# Patient Record
Sex: FEMALE | Race: BLACK OR AFRICAN AMERICAN | NOT HISPANIC OR LATINO | URBAN - METROPOLITAN AREA
[De-identification: names, ages, dates, MRNs, and addresses within clinical notes are randomized per-mention and may not be internally consistent; named-entity substitution may affect disease eponyms.]

---

## 2017-03-20 ENCOUNTER — OUTPATIENT (OUTPATIENT)
Dept: OUTPATIENT SERVICES | Facility: HOSPITAL | Age: 67
LOS: 1 days | End: 2017-03-20
Payer: MEDICARE

## 2017-03-20 PROCEDURE — 77067 SCR MAMMO BI INCL CAD: CPT

## 2017-03-20 PROCEDURE — G0202: CPT | Mod: 26

## 2017-08-29 ENCOUNTER — OUTPATIENT (OUTPATIENT)
Dept: OUTPATIENT SERVICES | Facility: HOSPITAL | Age: 67
LOS: 1 days | End: 2017-08-29
Payer: MEDICARE

## 2017-08-29 PROCEDURE — 77080 DXA BONE DENSITY AXIAL: CPT | Mod: 26

## 2017-08-29 PROCEDURE — 77080 DXA BONE DENSITY AXIAL: CPT

## 2017-09-07 ENCOUNTER — APPOINTMENT (OUTPATIENT)
Dept: RHEUMATOLOGY | Facility: CLINIC | Age: 67
End: 2017-09-07
Payer: MEDICARE

## 2017-09-07 ENCOUNTER — LABORATORY RESULT (OUTPATIENT)
Age: 67
End: 2017-09-07

## 2017-09-07 VITALS — HEART RATE: 60 BPM | WEIGHT: 205 LBS | HEIGHT: 66 IN | OXYGEN SATURATION: 95 % | BODY MASS INDEX: 32.95 KG/M2

## 2017-09-07 DIAGNOSIS — I87.2 VENOUS INSUFFICIENCY (CHRONIC) (PERIPHERAL): ICD-10-CM

## 2017-09-07 DIAGNOSIS — Z82.49 FAMILY HISTORY OF ISCHEMIC HEART DISEASE AND OTHER DISEASES OF THE CIRCULATORY SYSTEM: ICD-10-CM

## 2017-09-07 DIAGNOSIS — Z80.2 FAMILY HISTORY OF MALIGNANT NEOPLASM OF OTHER RESPIRATORY AND INTRATHORACIC ORGANS: ICD-10-CM

## 2017-09-07 DIAGNOSIS — Z78.9 OTHER SPECIFIED HEALTH STATUS: ICD-10-CM

## 2017-09-07 DIAGNOSIS — Z87.891 PERSONAL HISTORY OF NICOTINE DEPENDENCE: ICD-10-CM

## 2017-09-07 PROCEDURE — 99203 OFFICE O/P NEW LOW 30 MIN: CPT

## 2017-09-08 LAB
25(OH)D3 SERPL-MCNC: 45.7 NG/ML
ALBUMIN SERPL ELPH-MCNC: 4.8 G/DL
ALP BLD-CCNC: 60 U/L
ALT SERPL-CCNC: 12 U/L
ANION GAP SERPL CALC-SCNC: 19 MMOL/L
AST SERPL-CCNC: 21 U/L
BASOPHILS # BLD AUTO: 0.04 K/UL
BASOPHILS NFR BLD AUTO: 0.4 %
BILIRUB SERPL-MCNC: 0.3 MG/DL
BUN SERPL-MCNC: 19 MG/DL
CALCIUM SERPL-MCNC: 10.3 MG/DL
CHLORIDE SERPL-SCNC: 99 MMOL/L
CO2 SERPL-SCNC: 24 MMOL/L
CREAT SERPL-MCNC: 0.98 MG/DL
CRP SERPL-MCNC: 0.7 MG/DL
EOSINOPHIL # BLD AUTO: 0.14 K/UL
EOSINOPHIL NFR BLD AUTO: 1.4 %
ERYTHROCYTE [SEDIMENTATION RATE] IN BLOOD BY WESTERGREN METHOD: 36 MM/HR
GLUCOSE SERPL-MCNC: 68 MG/DL
HCT VFR BLD CALC: 37.1 %
HGB BLD-MCNC: 11.3 G/DL
IMM GRANULOCYTES NFR BLD AUTO: 0.4 %
LYMPHOCYTES # BLD AUTO: 1.72 K/UL
LYMPHOCYTES NFR BLD AUTO: 16.7 %
MAN DIFF?: NORMAL
MCHC RBC-ENTMCNC: 25.9 PG
MCHC RBC-ENTMCNC: 30.5 GM/DL
MCV RBC AUTO: 84.9 FL
MONOCYTES # BLD AUTO: 0.52 K/UL
MONOCYTES NFR BLD AUTO: 5.1 %
NEUTROPHILS # BLD AUTO: 7.83 K/UL
NEUTROPHILS NFR BLD AUTO: 76 %
PLATELET # BLD AUTO: 273 K/UL
POTASSIUM SERPL-SCNC: 3.7 MMOL/L
PROT SERPL-MCNC: 8 G/DL
RBC # BLD: 4.37 M/UL
RBC # FLD: 17.9 %
SODIUM SERPL-SCNC: 142 MMOL/L
WBC # FLD AUTO: 10.29 K/UL

## 2017-09-18 LAB
ANA SER IF-ACNC: NEGATIVE
CCP AB SER IA-ACNC: 222 UNITS
RF+CCP IGG SER-IMP: ABNORMAL

## 2017-11-09 ENCOUNTER — APPOINTMENT (OUTPATIENT)
Dept: RHEUMATOLOGY | Facility: CLINIC | Age: 67
End: 2017-11-09
Payer: MEDICARE

## 2017-11-09 VITALS — OXYGEN SATURATION: 97 % | HEART RATE: 74 BPM | DIASTOLIC BLOOD PRESSURE: 82 MMHG | SYSTOLIC BLOOD PRESSURE: 140 MMHG

## 2017-11-09 PROCEDURE — 99214 OFFICE O/P EST MOD 30 MIN: CPT

## 2017-11-15 LAB
ALBUMIN SERPL ELPH-MCNC: 4.2 G/DL
ALP BLD-CCNC: 64 U/L
ALT SERPL-CCNC: 18 U/L
ANION GAP SERPL CALC-SCNC: 20 MMOL/L
AST SERPL-CCNC: 21 U/L
BASOPHILS # BLD AUTO: 0.03 K/UL
BASOPHILS NFR BLD AUTO: 0.2 %
BILIRUB SERPL-MCNC: 0.3 MG/DL
BUN SERPL-MCNC: 14 MG/DL
CALCIUM SERPL-MCNC: 9.9 MG/DL
CHLORIDE SERPL-SCNC: 104 MMOL/L
CO2 SERPL-SCNC: 24 MMOL/L
CREAT SERPL-MCNC: 0.91 MG/DL
CRP SERPL-MCNC: 0.5 MG/DL
EOSINOPHIL # BLD AUTO: 0.23 K/UL
EOSINOPHIL NFR BLD AUTO: 1.9 %
ERYTHROCYTE [SEDIMENTATION RATE] IN BLOOD BY WESTERGREN METHOD: 27 MM/HR
GLUCOSE SERPL-MCNC: 115 MG/DL
HCT VFR BLD CALC: 39.2 %
HGB BLD-MCNC: 11.7 G/DL
IMM GRANULOCYTES NFR BLD AUTO: 0.4 %
LYMPHOCYTES # BLD AUTO: 1.36 K/UL
LYMPHOCYTES NFR BLD AUTO: 11.2 %
MAN DIFF?: NORMAL
MCHC RBC-ENTMCNC: 25.8 PG
MCHC RBC-ENTMCNC: 29.8 GM/DL
MCV RBC AUTO: 86.3 FL
MONOCYTES # BLD AUTO: 0.63 K/UL
MONOCYTES NFR BLD AUTO: 5.2 %
NEUTROPHILS # BLD AUTO: 9.86 K/UL
NEUTROPHILS NFR BLD AUTO: 81.1 %
PLATELET # BLD AUTO: 221 K/UL
POTASSIUM SERPL-SCNC: 3.7 MMOL/L
PROT SERPL-MCNC: 7.3 G/DL
RBC # BLD: 4.54 M/UL
RBC # FLD: 17.9 %
SODIUM SERPL-SCNC: 148 MMOL/L
WBC # FLD AUTO: 12.16 K/UL

## 2018-02-08 ENCOUNTER — APPOINTMENT (OUTPATIENT)
Dept: RHEUMATOLOGY | Facility: CLINIC | Age: 68
End: 2018-02-08
Payer: MEDICARE

## 2018-02-08 VITALS
WEIGHT: 201 LBS | DIASTOLIC BLOOD PRESSURE: 78 MMHG | OXYGEN SATURATION: 95 % | BODY MASS INDEX: 32.3 KG/M2 | HEART RATE: 78 BPM | SYSTOLIC BLOOD PRESSURE: 138 MMHG | HEIGHT: 66 IN

## 2018-02-08 PROCEDURE — 36415 COLL VENOUS BLD VENIPUNCTURE: CPT

## 2018-02-08 PROCEDURE — 99214 OFFICE O/P EST MOD 30 MIN: CPT | Mod: 25

## 2018-02-14 LAB
ALBUMIN SERPL ELPH-MCNC: 4.2 G/DL
ALP BLD-CCNC: 60 U/L
ALT SERPL-CCNC: 12 U/L
ANION GAP SERPL CALC-SCNC: 21 MMOL/L
AST SERPL-CCNC: 21 U/L
BASOPHILS # BLD AUTO: 0.02 K/UL
BASOPHILS NFR BLD AUTO: 0.2 %
BILIRUB SERPL-MCNC: 0.3 MG/DL
BUN SERPL-MCNC: 20 MG/DL
CALCIUM SERPL-MCNC: 9.2 MG/DL
CHLORIDE SERPL-SCNC: 104 MMOL/L
CO2 SERPL-SCNC: 23 MMOL/L
CREAT SERPL-MCNC: 0.91 MG/DL
CRP SERPL-MCNC: 1.4 MG/DL
EOSINOPHIL # BLD AUTO: 0.13 K/UL
EOSINOPHIL NFR BLD AUTO: 1.2 %
ERYTHROCYTE [SEDIMENTATION RATE] IN BLOOD BY WESTERGREN METHOD: 42 MM/HR
GLUCOSE SERPL-MCNC: 123 MG/DL
HCT VFR BLD CALC: 37.8 %
HGB BLD-MCNC: 11.1 G/DL
IMM GRANULOCYTES NFR BLD AUTO: 0.2 %
LYMPHOCYTES # BLD AUTO: 1.13 K/UL
LYMPHOCYTES NFR BLD AUTO: 10.6 %
MAN DIFF?: NORMAL
MCHC RBC-ENTMCNC: 26 PG
MCHC RBC-ENTMCNC: 29.4 GM/DL
MCV RBC AUTO: 88.5 FL
MONOCYTES # BLD AUTO: 0.43 K/UL
MONOCYTES NFR BLD AUTO: 4 %
NEUTROPHILS # BLD AUTO: 8.98 K/UL
NEUTROPHILS NFR BLD AUTO: 83.8 %
PLATELET # BLD AUTO: 290 K/UL
POTASSIUM SERPL-SCNC: 4 MMOL/L
PROT SERPL-MCNC: 7.4 G/DL
RBC # BLD: 4.27 M/UL
RBC # FLD: 19.1 %
SODIUM SERPL-SCNC: 148 MMOL/L
WBC # FLD AUTO: 10.71 K/UL

## 2018-02-21 ENCOUNTER — TRANSCRIPTION ENCOUNTER (OUTPATIENT)
Age: 68
End: 2018-02-21

## 2018-02-21 ENCOUNTER — OUTPATIENT (OUTPATIENT)
Dept: OUTPATIENT SERVICES | Facility: HOSPITAL | Age: 68
LOS: 1 days | End: 2018-02-21
Payer: MEDICARE

## 2018-02-21 PROCEDURE — 72050 X-RAY EXAM NECK SPINE 4/5VWS: CPT

## 2018-02-21 PROCEDURE — 72050 X-RAY EXAM NECK SPINE 4/5VWS: CPT | Mod: 26

## 2018-02-23 ENCOUNTER — OUTPATIENT (OUTPATIENT)
Dept: OUTPATIENT SERVICES | Facility: HOSPITAL | Age: 68
LOS: 1 days | End: 2018-02-23
Payer: MEDICARE

## 2018-02-23 ENCOUNTER — APPOINTMENT (OUTPATIENT)
Dept: ULTRASOUND IMAGING | Facility: HOSPITAL | Age: 68
End: 2018-02-23

## 2018-02-23 PROCEDURE — 76882 US LMTD JT/FCL EVL NVASC XTR: CPT

## 2018-02-23 PROCEDURE — 76882 US LMTD JT/FCL EVL NVASC XTR: CPT | Mod: 26,LT

## 2018-02-26 ENCOUNTER — APPOINTMENT (OUTPATIENT)
Dept: RHEUMATOLOGY | Facility: CLINIC | Age: 68
End: 2018-02-26
Payer: MEDICARE

## 2018-02-26 VITALS — OXYGEN SATURATION: 98 % | SYSTOLIC BLOOD PRESSURE: 138 MMHG | DIASTOLIC BLOOD PRESSURE: 72 MMHG | HEART RATE: 79 BPM

## 2018-02-26 PROCEDURE — 99214 OFFICE O/P EST MOD 30 MIN: CPT

## 2018-03-15 ENCOUNTER — APPOINTMENT (OUTPATIENT)
Dept: RHEUMATOLOGY | Facility: CLINIC | Age: 68
End: 2018-03-15
Payer: MEDICARE

## 2018-03-15 PROCEDURE — 36415 COLL VENOUS BLD VENIPUNCTURE: CPT

## 2018-03-20 LAB
ALBUMIN SERPL ELPH-MCNC: 4.3 G/DL
ALP BLD-CCNC: 57 U/L
ALT SERPL-CCNC: 13 U/L
ANION GAP SERPL CALC-SCNC: 20 MMOL/L
AST SERPL-CCNC: 16 U/L
BASOPHILS # BLD AUTO: 0.03 K/UL
BASOPHILS NFR BLD AUTO: 0.3 %
BILIRUB SERPL-MCNC: 0.3 MG/DL
BUN SERPL-MCNC: 14 MG/DL
CALCIUM SERPL-MCNC: 9.4 MG/DL
CHLORIDE SERPL-SCNC: 96 MMOL/L
CO2 SERPL-SCNC: 26 MMOL/L
CREAT SERPL-MCNC: 1.01 MG/DL
CRP SERPL-MCNC: 0.6 MG/DL
EOSINOPHIL # BLD AUTO: 0.08 K/UL
EOSINOPHIL NFR BLD AUTO: 0.7 %
ERYTHROCYTE [SEDIMENTATION RATE] IN BLOOD BY WESTERGREN METHOD: 25 MM/HR
GLUCOSE SERPL-MCNC: 86 MG/DL
HCT VFR BLD CALC: 37.7 %
HGB BLD-MCNC: 11.4 G/DL
IMM GRANULOCYTES NFR BLD AUTO: 0.4 %
LYMPHOCYTES # BLD AUTO: 1.53 K/UL
LYMPHOCYTES NFR BLD AUTO: 13.4 %
MAN DIFF?: NORMAL
MCHC RBC-ENTMCNC: 26.8 PG
MCHC RBC-ENTMCNC: 30.2 GM/DL
MCV RBC AUTO: 88.7 FL
MONOCYTES # BLD AUTO: 0.51 K/UL
MONOCYTES NFR BLD AUTO: 4.5 %
NEUTROPHILS # BLD AUTO: 9.24 K/UL
NEUTROPHILS NFR BLD AUTO: 80.7 %
PLATELET # BLD AUTO: 227 K/UL
POTASSIUM SERPL-SCNC: 3.5 MMOL/L
PROT SERPL-MCNC: 7.1 G/DL
RBC # BLD: 4.25 M/UL
RBC # FLD: 18.3 %
SODIUM SERPL-SCNC: 142 MMOL/L
WBC # FLD AUTO: 11.44 K/UL

## 2018-04-17 ENCOUNTER — APPOINTMENT (OUTPATIENT)
Dept: RHEUMATOLOGY | Facility: CLINIC | Age: 68
End: 2018-04-17
Payer: MEDICARE

## 2018-04-17 VITALS — HEART RATE: 77 BPM | OXYGEN SATURATION: 97 % | DIASTOLIC BLOOD PRESSURE: 90 MMHG | SYSTOLIC BLOOD PRESSURE: 170 MMHG

## 2018-04-17 PROCEDURE — 36415 COLL VENOUS BLD VENIPUNCTURE: CPT

## 2018-04-17 PROCEDURE — 99213 OFFICE O/P EST LOW 20 MIN: CPT | Mod: 25

## 2018-04-18 LAB
ALBUMIN SERPL ELPH-MCNC: 4.1 G/DL
ALP BLD-CCNC: 52 U/L
ALT SERPL-CCNC: 15 U/L
ANION GAP SERPL CALC-SCNC: 17 MMOL/L
AST SERPL-CCNC: 16 U/L
BASOPHILS # BLD AUTO: 0.05 K/UL
BASOPHILS NFR BLD AUTO: 0.5 %
BILIRUB SERPL-MCNC: 0.2 MG/DL
BUN SERPL-MCNC: 15 MG/DL
CALCIUM SERPL-MCNC: 9.3 MG/DL
CHLORIDE SERPL-SCNC: 101 MMOL/L
CO2 SERPL-SCNC: 26 MMOL/L
CREAT SERPL-MCNC: 1.2 MG/DL
CRP SERPL-MCNC: 0.3 MG/DL
EOSINOPHIL # BLD AUTO: 0.19 K/UL
EOSINOPHIL NFR BLD AUTO: 1.8 %
ERYTHROCYTE [SEDIMENTATION RATE] IN BLOOD BY WESTERGREN METHOD: 33 MM/HR
GLUCOSE SERPL-MCNC: 84 MG/DL
HCT VFR BLD CALC: 36.9 %
HGB BLD-MCNC: 11.4 G/DL
IMM GRANULOCYTES NFR BLD AUTO: 0.7 %
LYMPHOCYTES # BLD AUTO: 2.01 K/UL
LYMPHOCYTES NFR BLD AUTO: 19.2 %
MAN DIFF?: NORMAL
MCHC RBC-ENTMCNC: 27.1 PG
MCHC RBC-ENTMCNC: 30.9 GM/DL
MCV RBC AUTO: 87.9 FL
MONOCYTES # BLD AUTO: 0.62 K/UL
MONOCYTES NFR BLD AUTO: 5.9 %
NEUTROPHILS # BLD AUTO: 7.53 K/UL
NEUTROPHILS NFR BLD AUTO: 71.9 %
PLATELET # BLD AUTO: 308 K/UL
POTASSIUM SERPL-SCNC: 4.3 MMOL/L
PROT SERPL-MCNC: 7.2 G/DL
RBC # BLD: 4.2 M/UL
RBC # FLD: 17.9 %
SODIUM SERPL-SCNC: 144 MMOL/L
WBC # FLD AUTO: 10.47 K/UL

## 2018-05-10 ENCOUNTER — APPOINTMENT (OUTPATIENT)
Dept: RHEUMATOLOGY | Facility: CLINIC | Age: 68
End: 2018-05-10

## 2018-05-30 ENCOUNTER — APPOINTMENT (OUTPATIENT)
Dept: MAMMOGRAPHY | Facility: HOSPITAL | Age: 68
End: 2018-05-30

## 2018-05-30 ENCOUNTER — OUTPATIENT (OUTPATIENT)
Dept: OUTPATIENT SERVICES | Facility: HOSPITAL | Age: 68
LOS: 1 days | End: 2018-05-30
Payer: MEDICARE

## 2018-05-30 PROCEDURE — 77066 DX MAMMO INCL CAD BI: CPT | Mod: 26,GG

## 2018-05-30 PROCEDURE — 77067 SCR MAMMO BI INCL CAD: CPT

## 2018-05-30 PROCEDURE — 77063 BREAST TOMOSYNTHESIS BI: CPT

## 2018-05-30 PROCEDURE — 77063 BREAST TOMOSYNTHESIS BI: CPT | Mod: 26,59

## 2018-05-30 PROCEDURE — 77066 DX MAMMO INCL CAD BI: CPT

## 2018-05-30 PROCEDURE — 77067 SCR MAMMO BI INCL CAD: CPT | Mod: 26,59

## 2018-06-20 ENCOUNTER — RESULT REVIEW (OUTPATIENT)
Age: 68
End: 2018-06-20

## 2018-06-20 ENCOUNTER — APPOINTMENT (OUTPATIENT)
Dept: MAMMOGRAPHY | Facility: HOSPITAL | Age: 68
End: 2018-06-20
Payer: MEDICARE

## 2018-06-20 ENCOUNTER — OUTPATIENT (OUTPATIENT)
Dept: OUTPATIENT SERVICES | Facility: HOSPITAL | Age: 68
LOS: 1 days | End: 2018-06-20
Payer: MEDICARE

## 2018-06-20 ENCOUNTER — APPOINTMENT (OUTPATIENT)
Dept: ULTRASOUND IMAGING | Facility: HOSPITAL | Age: 68
End: 2018-06-20
Payer: MEDICARE

## 2018-06-20 PROCEDURE — 76641 ULTRASOUND BREAST COMPLETE: CPT

## 2018-06-20 PROCEDURE — 76641 ULTRASOUND BREAST COMPLETE: CPT | Mod: 26,50

## 2018-06-20 PROCEDURE — 19081 BX BREAST 1ST LESION STRTCTC: CPT | Mod: RT

## 2018-06-20 PROCEDURE — A4648: CPT

## 2018-06-20 PROCEDURE — 19081 BX BREAST 1ST LESION STRTCTC: CPT

## 2018-06-20 PROCEDURE — 88305 TISSUE EXAM BY PATHOLOGIST: CPT

## 2018-06-21 LAB — SURGICAL PATHOLOGY STUDY: SIGNIFICANT CHANGE UP

## 2018-06-26 ENCOUNTER — RESULT REVIEW (OUTPATIENT)
Age: 68
End: 2018-06-26

## 2018-06-27 ENCOUNTER — OUTPATIENT (OUTPATIENT)
Dept: OUTPATIENT SERVICES | Facility: HOSPITAL | Age: 68
LOS: 1 days | End: 2018-06-27
Payer: MEDICARE

## 2018-06-27 ENCOUNTER — APPOINTMENT (OUTPATIENT)
Dept: MAMMOGRAPHY | Facility: HOSPITAL | Age: 68
End: 2018-06-27
Payer: MEDICARE

## 2018-06-27 PROCEDURE — A4648: CPT

## 2018-06-27 PROCEDURE — 19081 BX BREAST 1ST LESION STRTCTC: CPT

## 2018-06-27 PROCEDURE — 19081 BX BREAST 1ST LESION STRTCTC: CPT | Mod: LT

## 2018-06-27 PROCEDURE — 88305 TISSUE EXAM BY PATHOLOGIST: CPT

## 2018-06-28 LAB — SURGICAL PATHOLOGY STUDY: SIGNIFICANT CHANGE UP

## 2018-07-19 ENCOUNTER — APPOINTMENT (OUTPATIENT)
Dept: RHEUMATOLOGY | Facility: CLINIC | Age: 68
End: 2018-07-19
Payer: MEDICARE

## 2018-07-19 VITALS
OXYGEN SATURATION: 99 % | BODY MASS INDEX: 31.66 KG/M2 | TEMPERATURE: 98 F | HEART RATE: 64 BPM | WEIGHT: 197 LBS | DIASTOLIC BLOOD PRESSURE: 83 MMHG | HEIGHT: 66 IN | SYSTOLIC BLOOD PRESSURE: 139 MMHG

## 2018-07-19 PROCEDURE — 36415 COLL VENOUS BLD VENIPUNCTURE: CPT

## 2018-07-19 PROCEDURE — 99214 OFFICE O/P EST MOD 30 MIN: CPT | Mod: 25

## 2018-07-25 LAB
ALBUMIN SERPL ELPH-MCNC: 4.9 G/DL
ALP BLD-CCNC: 62 U/L
ALT SERPL-CCNC: 14 U/L
ANION GAP SERPL CALC-SCNC: 20 MMOL/L
AST SERPL-CCNC: 19 U/L
BASOPHILS # BLD AUTO: 0.03 K/UL
BASOPHILS NFR BLD AUTO: 0.4 %
BILIRUB SERPL-MCNC: 0.2 MG/DL
BUN SERPL-MCNC: 14 MG/DL
CALCIUM SERPL-MCNC: 10.3 MG/DL
CHLORIDE SERPL-SCNC: 103 MMOL/L
CO2 SERPL-SCNC: 27 MMOL/L
CREAT SERPL-MCNC: 0.8 MG/DL
CRP SERPL-MCNC: 0.15 MG/DL
EOSINOPHIL # BLD AUTO: 0.13 K/UL
EOSINOPHIL NFR BLD AUTO: 1.7 %
ERYTHROCYTE [SEDIMENTATION RATE] IN BLOOD BY WESTERGREN METHOD: 17 MM/HR
GLUCOSE SERPL-MCNC: 97 MG/DL
HCT VFR BLD CALC: 36.8 %
HGB BLD-MCNC: 10.5 G/DL
IMM GRANULOCYTES NFR BLD AUTO: 0.4 %
LYMPHOCYTES # BLD AUTO: 1.63 K/UL
LYMPHOCYTES NFR BLD AUTO: 21.1 %
MAN DIFF?: NORMAL
MCHC RBC-ENTMCNC: 26.5 PG
MCHC RBC-ENTMCNC: 28.5 GM/DL
MCV RBC AUTO: 92.9 FL
MONOCYTES # BLD AUTO: 0.42 K/UL
MONOCYTES NFR BLD AUTO: 5.4 %
NEUTROPHILS # BLD AUTO: 5.47 K/UL
NEUTROPHILS NFR BLD AUTO: 71 %
PLATELET # BLD AUTO: 273 K/UL
POTASSIUM SERPL-SCNC: 4.2 MMOL/L
PROT SERPL-MCNC: 7.4 G/DL
RBC # BLD: 3.96 M/UL
RBC # FLD: 17 %
SODIUM SERPL-SCNC: 150 MMOL/L
WBC # FLD AUTO: 7.71 K/UL

## 2018-07-28 PROBLEM — I87.2 VENOUS INSUFFICIENCY: Status: ACTIVE | Noted: 2017-09-07

## 2018-10-18 ENCOUNTER — OUTPATIENT (OUTPATIENT)
Dept: OUTPATIENT SERVICES | Facility: HOSPITAL | Age: 68
LOS: 1 days | End: 2018-10-18
Payer: MEDICARE

## 2018-10-18 ENCOUNTER — APPOINTMENT (OUTPATIENT)
Dept: RHEUMATOLOGY | Facility: CLINIC | Age: 68
End: 2018-10-18
Payer: MEDICARE

## 2018-10-18 VITALS
TEMPERATURE: 98.1 F | DIASTOLIC BLOOD PRESSURE: 81 MMHG | HEART RATE: 84 BPM | SYSTOLIC BLOOD PRESSURE: 165 MMHG | OXYGEN SATURATION: 97 % | HEIGHT: 66 IN | BODY MASS INDEX: 31.5 KG/M2 | WEIGHT: 196 LBS

## 2018-10-18 PROCEDURE — 99214 OFFICE O/P EST MOD 30 MIN: CPT | Mod: 25

## 2018-10-18 PROCEDURE — 36415 COLL VENOUS BLD VENIPUNCTURE: CPT

## 2018-10-18 PROCEDURE — 73130 X-RAY EXAM OF HAND: CPT | Mod: 26,LT,RT

## 2018-10-18 PROCEDURE — 73130 X-RAY EXAM OF HAND: CPT

## 2018-10-24 LAB
ALBUMIN SERPL ELPH-MCNC: 4.8 G/DL
ALP BLD-CCNC: 62 U/L
ALT SERPL-CCNC: 11 U/L
ANION GAP SERPL CALC-SCNC: 16 MMOL/L
AST SERPL-CCNC: 16 U/L
BASOPHILS # BLD AUTO: 0.05 K/UL
BASOPHILS NFR BLD AUTO: 0.4 %
BILIRUB SERPL-MCNC: 0.2 MG/DL
BUN SERPL-MCNC: 17 MG/DL
CALCIUM SERPL-MCNC: 10.7 MG/DL
CHLORIDE SERPL-SCNC: 100 MMOL/L
CO2 SERPL-SCNC: 28 MMOL/L
CREAT SERPL-MCNC: 0.93 MG/DL
CRP SERPL-MCNC: <0.1 MG/DL
EOSINOPHIL # BLD AUTO: 0.06 K/UL
EOSINOPHIL NFR BLD AUTO: 0.5 %
ERYTHROCYTE [SEDIMENTATION RATE] IN BLOOD BY WESTERGREN METHOD: 26 MM/HR
GLUCOSE SERPL-MCNC: 192 MG/DL
HCT VFR BLD CALC: 37.5 %
HGB BLD-MCNC: 11.2 G/DL
IMM GRANULOCYTES NFR BLD AUTO: 0.3 %
LYMPHOCYTES # BLD AUTO: 1.93 K/UL
LYMPHOCYTES NFR BLD AUTO: 16.2 %
MAN DIFF?: NORMAL
MCHC RBC-ENTMCNC: 26.9 PG
MCHC RBC-ENTMCNC: 29.9 GM/DL
MCV RBC AUTO: 90.1 FL
MONOCYTES # BLD AUTO: 0.67 K/UL
MONOCYTES NFR BLD AUTO: 5.6 %
NEUTROPHILS # BLD AUTO: 9.13 K/UL
NEUTROPHILS NFR BLD AUTO: 77 %
PLATELET # BLD AUTO: 293 K/UL
POTASSIUM SERPL-SCNC: 4.1 MMOL/L
PROT SERPL-MCNC: 7.7 G/DL
RBC # BLD: 4.16 M/UL
RBC # FLD: 16.9 %
SODIUM SERPL-SCNC: 144 MMOL/L
WBC # FLD AUTO: 11.88 K/UL

## 2018-11-19 ENCOUNTER — RX RENEWAL (OUTPATIENT)
Age: 68
End: 2018-11-19

## 2018-11-29 ENCOUNTER — APPOINTMENT (OUTPATIENT)
Dept: HEART AND VASCULAR | Facility: CLINIC | Age: 68
End: 2018-11-29
Payer: MEDICARE

## 2018-11-29 VITALS
TEMPERATURE: 98.6 F | SYSTOLIC BLOOD PRESSURE: 140 MMHG | DIASTOLIC BLOOD PRESSURE: 80 MMHG | WEIGHT: 193.98 LBS | BODY MASS INDEX: 34.37 KG/M2 | HEART RATE: 61 BPM | HEIGHT: 62.8 IN | OXYGEN SATURATION: 97 %

## 2018-11-29 PROCEDURE — 93000 ELECTROCARDIOGRAM COMPLETE: CPT

## 2018-11-29 PROCEDURE — 99204 OFFICE O/P NEW MOD 45 MIN: CPT

## 2018-11-29 RX ORDER — CHROMIUM 200 MCG
TABLET ORAL
Refills: 0 | Status: DISCONTINUED | COMMUNITY
End: 2018-11-29

## 2018-11-29 RX ORDER — FOLIC ACID 1 MG/1
1 TABLET ORAL
Qty: 60 | Refills: 5 | Status: DISCONTINUED | COMMUNITY
Start: 2018-02-08 | End: 2018-11-29

## 2018-11-29 RX ORDER — HYDROXYCHLOROQUINE SULFATE 200 MG/1
200 TABLET, FILM COATED ORAL
Qty: 60 | Refills: 0 | Status: DISCONTINUED | COMMUNITY
Start: 2017-06-02 | End: 2018-11-29

## 2018-11-29 RX ORDER — ASCORBIC ACID 500 MG
500 TABLET ORAL DAILY
Refills: 0 | Status: ACTIVE | COMMUNITY

## 2018-11-29 RX ORDER — HYDROCHLOROTHIAZIDE 12.5 MG/1
TABLET ORAL
Refills: 0 | Status: DISCONTINUED | COMMUNITY
End: 2018-11-29

## 2018-11-29 RX ORDER — HYDROXYCHLOROQUINE SULFATE 200 MG/1
200 TABLET, FILM COATED ORAL TWICE DAILY
Qty: 60 | Refills: 3 | Status: DISCONTINUED | COMMUNITY
Start: 2017-12-27 | End: 2018-11-29

## 2018-11-29 NOTE — REASON FOR VISIT
[FreeTextEntry1] : Patient seen for evaluation and management of chronic hypertension.\par \par She first developed HTN in the late 1990s, when her weight increased from a baseline of 175 lbs to >210. She managed BP with weight loss, and did not start meds for BP until about 2006, when her weight again increased, this time to a peak of about 230 lbs. She was found to have DM at the same time and initiated medical therapy for both. She attributes her weight gain to stress-related eating. She has since lost weight again, down to about 195, and continues to lose weight slowly and deliberately to a goal of 175. \par \par She keeps excellent records of her BP at home (see attached), which remains suboptimally controlled despite multidrug regimen which patient states she is taking as directed. She has a strong family hx of HTN.\par \par In addition to HTN and DM, she is a former 25 pack-year smoker, but quit in 1991. No hx of CVD and denies CP, SOB, palpitations. Has had prior stress testing (no clear indication), last performed on 2/27/2017 and reported to be "good." Had an echocardiogram at same time said to show "enlarged heart."\par \par Activity limited by chronic back pain related to disk disease, but walks daily about 5-10 blocks. Has RA, but sx's limited mostly to shoulders. States her exercise capacity has increased since she has lost weight.\par \par She is a retired RN. Not . Has one adult daughter.

## 2018-11-29 NOTE — PHYSICAL EXAM
[General Appearance - In No Acute Distress] : no acute distress [Normal Conjunctiva] : the conjunctiva exhibited no abnormalities [Eyelids - No Xanthelasma] : the eyelids demonstrated no xanthelasmas [Heart Sounds] : normal S1 and S2 [FreeTextEntry1] : occ premature beats. 2/6 BRICE at base [Respiration, Rhythm And Depth] : normal respiratory rhythm and effort [Auscultation Breath Sounds / Voice Sounds] : lungs were clear to auscultation bilaterally [Bowel Sounds] : normal bowel sounds [Abdomen Soft] : soft [Abdomen Tenderness] : non-tender [] : no hepato-splenomegaly [Nail Clubbing] : no clubbing of the fingernails [Cyanosis, Localized] : no localized cyanosis [Skin Color & Pigmentation] : normal skin color and pigmentation [Skin Turgor] : normal skin turgor [Affect] : the affect was normal

## 2018-11-29 NOTE — DISCUSSION/SUMMARY
[FreeTextEntry1] : Patient is on a complex multi-drug regimen for HTN, in addition to extensive list of other meds for DM, RA and chronic pain. She is monitoring her BP closely at home and is successfully gradually losing weight.\par \par We discussed strategies for better BP control including adding an additional antihypertensive agent. I suggested, as an alternative, that she continue to lose weight and monitor BP, and reassess progress in a few months. If she has not made significant progress with weight, or if weight loss is unaccompanied by fall in BP, will add another agent.\par \par I also asked patient to provide record of prior echocardiogram that reportedly showed an enlarged heart.

## 2019-01-02 ENCOUNTER — APPOINTMENT (OUTPATIENT)
Dept: MAMMOGRAPHY | Facility: HOSPITAL | Age: 69
End: 2019-01-02

## 2019-01-02 ENCOUNTER — OUTPATIENT (OUTPATIENT)
Dept: OUTPATIENT SERVICES | Facility: HOSPITAL | Age: 69
LOS: 1 days | End: 2019-01-02
Payer: MEDICARE

## 2019-01-02 PROCEDURE — 77066 DX MAMMO INCL CAD BI: CPT | Mod: 26

## 2019-01-02 PROCEDURE — G0279: CPT

## 2019-01-02 PROCEDURE — G0279: CPT | Mod: 26

## 2019-01-02 PROCEDURE — 77066 DX MAMMO INCL CAD BI: CPT

## 2019-01-10 ENCOUNTER — APPOINTMENT (OUTPATIENT)
Dept: ULTRASOUND IMAGING | Facility: HOSPITAL | Age: 69
End: 2019-01-10
Payer: MEDICARE

## 2019-01-10 ENCOUNTER — OUTPATIENT (OUTPATIENT)
Dept: OUTPATIENT SERVICES | Facility: HOSPITAL | Age: 69
LOS: 1 days | End: 2019-01-10
Payer: MEDICARE

## 2019-01-10 PROCEDURE — 76641 ULTRASOUND BREAST COMPLETE: CPT | Mod: 26,RT

## 2019-01-10 PROCEDURE — 76641 ULTRASOUND BREAST COMPLETE: CPT

## 2019-01-18 ENCOUNTER — APPOINTMENT (OUTPATIENT)
Dept: RHEUMATOLOGY | Facility: CLINIC | Age: 69
End: 2019-01-18
Payer: MEDICARE

## 2019-01-18 VITALS
SYSTOLIC BLOOD PRESSURE: 127 MMHG | TEMPERATURE: 98.8 F | BODY MASS INDEX: 34.6 KG/M2 | HEIGHT: 62 IN | DIASTOLIC BLOOD PRESSURE: 82 MMHG | HEART RATE: 68 BPM | OXYGEN SATURATION: 95 % | WEIGHT: 188 LBS

## 2019-01-18 PROCEDURE — 99214 OFFICE O/P EST MOD 30 MIN: CPT | Mod: 25

## 2019-01-18 PROCEDURE — 36415 COLL VENOUS BLD VENIPUNCTURE: CPT

## 2019-01-18 NOTE — ASSESSMENT
[FreeTextEntry1] : 68 year old female presents for evaluation of previously diagnosed seropositive nonerosive rheumatoid arthritis and lower back pain presents for follow up. \par Doing well at this time. \par No flares. \par Losing weight. Continue to encourage exercise. \par Continue current regimen of triple therapy. \par \par \par

## 2019-01-18 NOTE — HISTORY OF PRESENT ILLNESS
[___ Week(s) Ago] : [unfilled] week(s) ago [FreeTextEntry1] : Office Visit 10/181/8:\par Feels well overall \par No symptoms today - denies swelling/stiffness in joints\par Plan: Doing well. \par Continue triple therapy. \par Check labs. \par Continue vitamin d and dietary calcium. \par \par Office Visit 7/19/18:\par Notes pain in her R shoulder, rarely in L also. Points to subacromial bursae when asked for site of pain. Not worse with any movements. She takes Hydrocodone for back pain and states this relieves her shoulder pain. At night time she will take Tylonel usually. \par States she is active, walks about twenty blocks a day. \par Overall feeling better and without complaints. \par Feels at her normal baseline functional status at this time. \par Plan: Doing well \par Continue current regimen\par Check labs for monitoring toxicity on current regimen. \par \par Office Visit 4/17/18:\par Patient states she feels well and denies any joint complaints today \par States her prior shoulder pain feels much better\par Currently has a cold\par Mild leukocytosis and stable anemia on lab work\par Markers of disease activity trending down \par No swelling or morning stiffness in any joints\par Plan: Continue triple therapy for now given remarkable improvement both clinically and serologically. \par Check labs for monitoring of disease activity and toxicities from MTX and SSZ. \par \par Office Visit 2/26/18:\par Patient with elevated ESR/CRP on last set of labs, mild translation of C spine on XR and nodulosis. No active arthritis. Discussed that we should escalate therapy in this setting however she was resistant to doing so at this time. She refuses to take injectable therapies. We discussed the possibility of adding Sulfasalzine for triple therapy and she stated she would think about it. She presents today and agrees to try this. \par Denies any joint pains / swelling / stiffness today. \par No new nodules. \par Plan: Given suboptimally controlled disease with elevated acute phase reactants, C spine translation and nodulosis will add Sulfasalazine 500mg BID for now. Patient to return in two weeks to re check CBC. \par \par Office Visit 2/8/17:\par Patient states she feels well. \par Notes ongoing pain in her trapezius muscles but not interested in trying a C Spine pillow as I think this has to do with the way she sleeps. \par Not interested in PT\par SC nodule on her elbow is resolving. \par Denies swelling or morning stiffness in any joints. \par Plan: She is currently happy with her medication regimen. Her arthritis is not active and thus will continue her current medication regimen of MTX 20mg and folic acid 2mg daily along with Plaquenil 200mg daily. \par Check CBC, CMP, ESR and CRP \par She never went to have the US of her SC nodule on her arm but is not interested in doing so. \par Given her kyphosis and neck pain, I have encourage patient to try PT and a C spine pillow to correct the way she sleeps. She is not interested in trying the pillow but will consider PT pending what is available. \par Management of back pain per spine specialist. \par \par Office Visit 11/9/17:\par High titre RF on last set of labs.\par Denies swelling or morning stiffness in any joints today. \par Overall has felt great since her epidural injections (by pain specialist) \par Has a nodular bony lesion overlying L forearm which does not bother her currently. States she has had this for many years and it is more painful during disease flares. \par Plan: \par -Patient currently on MTX 20mg and folic acid 2mg daily along with Plaquenil 200mg daily and has done well on this regimen. She has no flares, and eventually I think we can consider tapering down her MTX. \par -She has ongoing back pain but states that her pain is under control with her Fentanyl patch and hydrocodone. \par -Optho screening UTD for plaquenil toxicity \par -US nodular lesion on forearm  \par Office visit 9/7/17:\par Patient diagnosed with rheumatoid arthritis (unclear serologies) twenty years ago though she only started therapy in 2009 and has been on a stable dose of MTX and Plaquenil since. She states that she initially had swelling and morning stiffness in her MCPs and wrists. Currently she denies any flares - no swelling or morning stiffness in any of her joints. She states she has essentially been well controlled since 2014, and this is consistent with her notes from Dr. Mueller. Though I have no recent lab results or serologies. \par Patient has a herniated disc in her back which causes pain, has difficulty standing for prolonged periods and walking more than 5 blocks. She sees a pain specialist and has a Fentanyl patch as well as Hydrocodone tablets, for which she takes up to 5 a day. \par Recent evaluation for lower extremity swelling, has a history of vein ablation and sclerotherapy in bilateral lower extremities. She had been started on Lasix which has partially resolved the swelling and she had a US last week which ruled out a DVT. \par Patient fully independent with ADLs and iADLS.

## 2019-01-18 NOTE — DATA REVIEWED
[FreeTextEntry1] : DEXA 8/29/17:\par LH TS -0.3 \par FN TS -2.4\par AP Spine TS 1.7 \par \par FRAX major fracture 8.4 hip fracture 1.9 \par \par XR C Spine 2/21/18:\par Degenerative changes of the cervical spine\par Translation of C3-4 and C4-5 anterolisthesis with extension and flexion. \par Translation of the C5 on C6 with flexion. \par \par US left wrist 2/23/18:\par Area of palpable abnormality corresponding to an osseous protuberance in the region of the ulna with differential considerations including osteophyte, exostosis and accessory ossicle and other etiologies. No ganglion cyst.

## 2019-01-18 NOTE — REVIEW OF SYSTEMS
[As Noted in HPI] : as noted in HPI [Fever] : no fever [Chills] : no chills [Eye Pain] : no eye pain [Red Eyes] : eyes not red [Dry Eyes] : no dryness of the eyes [Nasal Discharge] : no nasal discharge [Sore Throat] : no sore throat [Chest Pain] : no chest pain [Palpitations] : no palpitations [Leg Claudication] : no intermittent leg claudication [Lower Ext Edema] : no lower extremity edema [Shortness Of Breath] : no shortness of breath [Wheezing] : no wheezing [Cough] : no cough [SOB on Exertion] : no shortness of breath during exertion [Abdominal Pain] : no abdominal pain [Vomiting] : no vomiting [Constipation] : no constipation [Diarrhea] : no diarrhea [Dysuria] : no dysuria [Incontinence] : no incontinence [Skin Lesions] : no skin lesions [Dizziness] : no dizziness [Fainting] : no fainting [Anxiety] : no anxiety [Depression] : no depression

## 2019-01-18 NOTE — PHYSICAL EXAM
[General Appearance - Alert] : alert [General Appearance - In No Acute Distress] : in no acute distress [Sclera] : the sclera and conjunctiva were normal [Outer Ear] : the ears and nose were normal in appearance [Hearing Threshold Finger Rub Not [place]] : hearing was normal [Examination Of The Oral Cavity] : the lips and gums were normal [Neck Cervical Mass (___cm)] : no neck mass was observed [Respiration, Rhythm And Depth] : normal respiratory rhythm and effort [Auscultation Breath Sounds / Voice Sounds] : lungs were clear to auscultation bilaterally [Heart Rate And Rhythm] : heart rate was normal and rhythm regular [Heart Sounds] : normal S1 and S2 [Bowel Sounds] : normal bowel sounds [Abdomen Soft] : soft [Abdomen Tenderness] : non-tender [Cervical Lymph Nodes Enlarged Posterior Bilaterally] : posterior cervical [Cervical Lymph Nodes Enlarged Anterior Bilaterally] : anterior cervical [No Spinal Tenderness] : no spinal tenderness [Musculoskeletal - Swelling] : no joint swelling seen [Motor Tone] : muscle strength and tone were normal [] : no rash [Sensation] : the sensory exam was normal to light touch and pinprick [Motor Exam] : the motor exam was normal [Oriented To Time, Place, And Person] : oriented to person, place, and time [Affect] : the affect was normal [Mood] : the mood was normal [FreeTextEntry1] : Slow gait. No synovitis.

## 2019-01-23 LAB
ALBUMIN SERPL ELPH-MCNC: 4.9 G/DL
ALP BLD-CCNC: 51 U/L
ALT SERPL-CCNC: 13 U/L
ANION GAP SERPL CALC-SCNC: 16 MMOL/L
AST SERPL-CCNC: 20 U/L
BASOPHILS # BLD AUTO: 0.03 K/UL
BASOPHILS NFR BLD AUTO: 0.3 %
BILIRUB SERPL-MCNC: 0.2 MG/DL
BUN SERPL-MCNC: 21 MG/DL
CALCIUM SERPL-MCNC: 10.7 MG/DL
CHLORIDE SERPL-SCNC: 102 MMOL/L
CO2 SERPL-SCNC: 27 MMOL/L
CREAT SERPL-MCNC: 1.15 MG/DL
CRP SERPL-MCNC: 0.31 MG/DL
EOSINOPHIL # BLD AUTO: 0.11 K/UL
EOSINOPHIL NFR BLD AUTO: 1.2 %
ERYTHROCYTE [SEDIMENTATION RATE] IN BLOOD BY WESTERGREN METHOD: 32 MM/HR
GLUCOSE SERPL-MCNC: 75 MG/DL
HCT VFR BLD CALC: 39.6 %
HGB BLD-MCNC: 11.6 G/DL
IMM GRANULOCYTES NFR BLD AUTO: 0.4 %
LYMPHOCYTES # BLD AUTO: 1.76 K/UL
LYMPHOCYTES NFR BLD AUTO: 19.3 %
MAN DIFF?: NORMAL
MCHC RBC-ENTMCNC: 26.7 PG
MCHC RBC-ENTMCNC: 29.3 GM/DL
MCV RBC AUTO: 91 FL
MONOCYTES # BLD AUTO: 0.66 K/UL
MONOCYTES NFR BLD AUTO: 7.2 %
NEUTROPHILS # BLD AUTO: 6.54 K/UL
NEUTROPHILS NFR BLD AUTO: 71.6 %
PLATELET # BLD AUTO: 269 K/UL
POTASSIUM SERPL-SCNC: 3.9 MMOL/L
PROT SERPL-MCNC: 8.3 G/DL
RBC # BLD: 4.35 M/UL
RBC # FLD: 18.2 %
SODIUM SERPL-SCNC: 145 MMOL/L
WBC # FLD AUTO: 9.14 K/UL

## 2019-02-28 ENCOUNTER — OUTPATIENT (OUTPATIENT)
Dept: OUTPATIENT SERVICES | Facility: HOSPITAL | Age: 69
LOS: 1 days | End: 2019-02-28
Payer: MEDICARE

## 2019-02-28 ENCOUNTER — APPOINTMENT (OUTPATIENT)
Dept: HEART AND VASCULAR | Facility: CLINIC | Age: 69
End: 2019-02-28
Payer: MEDICARE

## 2019-02-28 ENCOUNTER — RESULT REVIEW (OUTPATIENT)
Age: 69
End: 2019-02-28

## 2019-02-28 ENCOUNTER — APPOINTMENT (OUTPATIENT)
Dept: MAMMOGRAPHY | Facility: HOSPITAL | Age: 69
End: 2019-02-28
Payer: MEDICARE

## 2019-02-28 VITALS
DIASTOLIC BLOOD PRESSURE: 80 MMHG | SYSTOLIC BLOOD PRESSURE: 130 MMHG | HEART RATE: 101 BPM | HEIGHT: 62 IN | BODY MASS INDEX: 34.23 KG/M2 | OXYGEN SATURATION: 97 % | TEMPERATURE: 99 F | WEIGHT: 186 LBS

## 2019-02-28 PROCEDURE — 19081 BX BREAST 1ST LESION STRTCTC: CPT | Mod: RT

## 2019-02-28 PROCEDURE — 19081 BX BREAST 1ST LESION STRTCTC: CPT

## 2019-02-28 PROCEDURE — 19082 BX BREAST ADD LESION STRTCTC: CPT

## 2019-02-28 PROCEDURE — 99213 OFFICE O/P EST LOW 20 MIN: CPT

## 2019-02-28 PROCEDURE — 93000 ELECTROCARDIOGRAM COMPLETE: CPT

## 2019-02-28 PROCEDURE — 88305 TISSUE EXAM BY PATHOLOGIST: CPT

## 2019-02-28 PROCEDURE — A4648: CPT

## 2019-02-28 PROCEDURE — 19082 BX BREAST ADD LESION STRTCTC: CPT | Mod: RT

## 2019-02-28 RX ORDER — METOPROLOL SUCCINATE 100 MG/1
TABLET, EXTENDED RELEASE ORAL
Refills: 0 | Status: DISCONTINUED | COMMUNITY
End: 2019-02-28

## 2019-02-28 NOTE — HISTORY OF PRESENT ILLNESS
[FreeTextEntry1] : Generally feeling well. Has made significant and sustained changes to how she eats, including avoiding food after 8:00 PM and adding fruits and vegetables to her diet, with about 4.5 kg weight loss since prior visit. She has documented improved control of her BP and blood sugar. Has started going to a gym. No symptoms.

## 2019-02-28 NOTE — PHYSICAL EXAM
[General Appearance - In No Acute Distress] : no acute distress [Auscultation Breath Sounds / Voice Sounds] : lungs were clear to auscultation bilaterally [Heart Sounds] : normal S1 and S2 [Edema] : no peripheral edema present [FreeTextEntry1] : occ premature beats Peripheral Venous

## 2019-03-01 LAB — SURGICAL PATHOLOGY STUDY: SIGNIFICANT CHANGE UP

## 2019-04-18 ENCOUNTER — APPOINTMENT (OUTPATIENT)
Dept: RHEUMATOLOGY | Facility: CLINIC | Age: 69
End: 2019-04-18
Payer: MEDICARE

## 2019-04-18 VITALS
WEIGHT: 185 LBS | SYSTOLIC BLOOD PRESSURE: 144 MMHG | DIASTOLIC BLOOD PRESSURE: 80 MMHG | OXYGEN SATURATION: 98 % | HEART RATE: 88 BPM | HEIGHT: 62 IN | TEMPERATURE: 98.8 F | BODY MASS INDEX: 34.04 KG/M2

## 2019-04-18 PROCEDURE — 36415 COLL VENOUS BLD VENIPUNCTURE: CPT

## 2019-04-18 PROCEDURE — 99214 OFFICE O/P EST MOD 30 MIN: CPT | Mod: 25

## 2019-04-18 NOTE — REVIEW OF SYSTEMS
[As Noted in HPI] : as noted in HPI [Fever] : no fever [Chills] : no chills [Red Eyes] : eyes not red [Eye Pain] : no eye pain [Dry Eyes] : no dryness of the eyes [Nasal Discharge] : no nasal discharge [Chest Pain] : no chest pain [Sore Throat] : no sore throat [Palpitations] : no palpitations [Leg Claudication] : no intermittent leg claudication [Shortness Of Breath] : no shortness of breath [Lower Ext Edema] : no lower extremity edema [Cough] : no cough [Wheezing] : no wheezing [SOB on Exertion] : no shortness of breath during exertion [Abdominal Pain] : no abdominal pain [Vomiting] : no vomiting [Constipation] : no constipation [Diarrhea] : no diarrhea [Dysuria] : no dysuria [Incontinence] : no incontinence [Skin Lesions] : no skin lesions [Dizziness] : no dizziness [Fainting] : no fainting [Anxiety] : no anxiety [Depression] : no depression

## 2019-04-18 NOTE — PHYSICAL EXAM
[General Appearance - Alert] : alert [General Appearance - In No Acute Distress] : in no acute distress [Sclera] : the sclera and conjunctiva were normal [Outer Ear] : the ears and nose were normal in appearance [Hearing Threshold Finger Rub Not [place]] : hearing was normal [Examination Of The Oral Cavity] : the lips and gums were normal [Neck Cervical Mass (___cm)] : no neck mass was observed [Respiration, Rhythm And Depth] : normal respiratory rhythm and effort [Auscultation Breath Sounds / Voice Sounds] : lungs were clear to auscultation bilaterally [Heart Rate And Rhythm] : heart rate was normal and rhythm regular [Heart Sounds] : normal S1 and S2 [Bowel Sounds] : normal bowel sounds [Abdomen Soft] : soft [Abdomen Tenderness] : non-tender [Cervical Lymph Nodes Enlarged Anterior Bilaterally] : anterior cervical [Cervical Lymph Nodes Enlarged Posterior Bilaterally] : posterior cervical [No Spinal Tenderness] : no spinal tenderness [Musculoskeletal - Swelling] : no joint swelling seen [Motor Tone] : muscle strength and tone were normal [] : no rash [Sensation] : the sensory exam was normal to light touch and pinprick [Motor Exam] : the motor exam was normal [Oriented To Time, Place, And Person] : oriented to person, place, and time [Affect] : the affect was normal [Mood] : the mood was normal [FreeTextEntry1] : Slow gait. No synovitis.

## 2019-04-18 NOTE — HISTORY OF PRESENT ILLNESS
[___ Week(s) Ago] : [unfilled] week(s) ago [FreeTextEntry1] : Office Visit 1/18/19:\par Doing well, no joint pains \par No joint pain / swelling / stiffness \par Plan: Doing well at this time. \par No flares. \par Losing weight. Continue to encourage exercise. \par Continue current regimen of triple therapy. \par \par Office Visit 10/181/8:\par Feels well overall \par No symptoms today - denies swelling/stiffness in joints\par Plan: Doing well. \par Continue triple therapy. \par Check labs. \par Continue vitamin d and dietary calcium. \par \par Office Visit 7/19/18:\par Notes pain in her R shoulder, rarely in L also. Points to subacromial bursae when asked for site of pain. Not worse with any movements. She takes Hydrocodone for back pain and states this relieves her shoulder pain. At night time she will take Tylonel usually. \par States she is active, walks about twenty blocks a day. \par Overall feeling better and without complaints. \par Feels at her normal baseline functional status at this time. \par Plan: Doing well \par Continue current regimen\par Check labs for monitoring toxicity on current regimen. \par \par Office Visit 4/17/18:\par Patient states she feels well and denies any joint complaints today \par States her prior shoulder pain feels much better\par Currently has a cold\par Mild leukocytosis and stable anemia on lab work\par Markers of disease activity trending down \par No swelling or morning stiffness in any joints\par Plan: Continue triple therapy for now given remarkable improvement both clinically and serologically. \par Check labs for monitoring of disease activity and toxicities from MTX and SSZ. \par \par Office Visit 2/26/18:\par Patient with elevated ESR/CRP on last set of labs, mild translation of C spine on XR and nodulosis. No active arthritis. Discussed that we should escalate therapy in this setting however she was resistant to doing so at this time. She refuses to take injectable therapies. We discussed the possibility of adding Sulfasalzine for triple therapy and she stated she would think about it. She presents today and agrees to try this. \par Denies any joint pains / swelling / stiffness today. \par No new nodules. \par Plan: Given suboptimally controlled disease with elevated acute phase reactants, C spine translation and nodulosis will add Sulfasalazine 500mg BID for now. Patient to return in two weeks to re check CBC. \par \par Office Visit 2/8/17:\par Patient states she feels well. \par Notes ongoing pain in her trapezius muscles but not interested in trying a C Spine pillow as I think this has to do with the way she sleeps. \par Not interested in PT\par SC nodule on her elbow is resolving. \par Denies swelling or morning stiffness in any joints. \par Plan: She is currently happy with her medication regimen. Her arthritis is not active and thus will continue her current medication regimen of MTX 20mg and folic acid 2mg daily along with Plaquenil 200mg daily. \par Check CBC, CMP, ESR and CRP \par She never went to have the US of her SC nodule on her arm but is not interested in doing so. \par Given her kyphosis and neck pain, I have encourage patient to try PT and a C spine pillow to correct the way she sleeps. She is not interested in trying the pillow but will consider PT pending what is available. \par Management of back pain per spine specialist. \par \par Office Visit 11/9/17:\par High titre RF on last set of labs.\par Denies swelling or morning stiffness in any joints today. \par Overall has felt great since her epidural injections (by pain specialist) \par Has a nodular bony lesion overlying L forearm which does not bother her currently. States she has had this for many years and it is more painful during disease flares. \par Plan: \par -Patient currently on MTX 20mg and folic acid 2mg daily along with Plaquenil 200mg daily and has done well on this regimen. She has no flares, and eventually I think we can consider tapering down her MTX. \par -She has ongoing back pain but states that her pain is under control with her Fentanyl patch and hydrocodone. \par -Optho screening UTD for plaquenil toxicity \par -US nodular lesion on forearm  \par Office visit 9/7/17:\par Patient diagnosed with rheumatoid arthritis (unclear serologies) twenty years ago though she only started therapy in 2009 and has been on a stable dose of MTX and Plaquenil since. She states that she initially had swelling and morning stiffness in her MCPs and wrists. Currently she denies any flares - no swelling or morning stiffness in any of her joints. She states she has essentially been well controlled since 2014, and this is consistent with her notes from Dr. Mueller. Though I have no recent lab results or serologies. \par Patient has a herniated disc in her back which causes pain, has difficulty standing for prolonged periods and walking more than 5 blocks. She sees a pain specialist and has a Fentanyl patch as well as Hydrocodone tablets, for which she takes up to 5 a day. \par Recent evaluation for lower extremity swelling, has a history of vein ablation and sclerotherapy in bilateral lower extremities. She had been started on Lasix which has partially resolved the swelling and she had a US last week which ruled out a DVT. \par Patient fully independent with ADLs and iADLS.

## 2019-04-18 NOTE — ASSESSMENT
[FreeTextEntry1] : 68 year old female presents for evaluation of previously diagnosed seropositive nonerosive rheumatoid arthritis and lower back pain presents for follow up. \par Doing well \par Continue triple therapy. \par Check labs. \par \par

## 2019-04-24 LAB
ALBUMIN SERPL ELPH-MCNC: 4.6 G/DL
ALP BLD-CCNC: 46 U/L
ALT SERPL-CCNC: 11 U/L
ANION GAP SERPL CALC-SCNC: 16 MMOL/L
AST SERPL-CCNC: 17 U/L
BASOPHILS # BLD AUTO: 0.06 K/UL
BASOPHILS NFR BLD AUTO: 0.6 %
BILIRUB SERPL-MCNC: 0.2 MG/DL
BUN SERPL-MCNC: 18 MG/DL
CALCIUM SERPL-MCNC: 9.6 MG/DL
CHLORIDE SERPL-SCNC: 104 MMOL/L
CO2 SERPL-SCNC: 26 MMOL/L
CREAT SERPL-MCNC: 1.86 MG/DL
CRP SERPL-MCNC: 0.25 MG/DL
EOSINOPHIL # BLD AUTO: 0.08 K/UL
EOSINOPHIL NFR BLD AUTO: 0.8 %
ERYTHROCYTE [SEDIMENTATION RATE] IN BLOOD BY WESTERGREN METHOD: 43 MM/HR
GLUCOSE SERPL-MCNC: 93 MG/DL
HCT VFR BLD CALC: 34.7 %
HGB BLD-MCNC: 10.2 G/DL
IMM GRANULOCYTES NFR BLD AUTO: 0.8 %
LYMPHOCYTES # BLD AUTO: 1.13 K/UL
LYMPHOCYTES NFR BLD AUTO: 11.1 %
MAN DIFF?: NORMAL
MCHC RBC-ENTMCNC: 27.6 PG
MCHC RBC-ENTMCNC: 29.4 GM/DL
MCV RBC AUTO: 94 FL
MONOCYTES # BLD AUTO: 0.73 K/UL
MONOCYTES NFR BLD AUTO: 7.2 %
NEUTROPHILS # BLD AUTO: 8.11 K/UL
NEUTROPHILS NFR BLD AUTO: 79.5 %
PLATELET # BLD AUTO: 193 K/UL
POTASSIUM SERPL-SCNC: 4.1 MMOL/L
PROT SERPL-MCNC: 6.9 G/DL
RBC # BLD: 3.69 M/UL
RBC # FLD: 17 %
SODIUM SERPL-SCNC: 145 MMOL/L
WBC # FLD AUTO: 10.19 K/UL

## 2019-05-07 ENCOUNTER — MOBILE ON CALL (OUTPATIENT)
Age: 69
End: 2019-05-07

## 2019-05-07 RX ORDER — OLMESARTAN MEDOXOMIL 40 MG/1
40 TABLET, FILM COATED ORAL DAILY
Refills: 0 | Status: DISCONTINUED | COMMUNITY
End: 2019-05-07

## 2019-06-27 ENCOUNTER — APPOINTMENT (OUTPATIENT)
Dept: HEART AND VASCULAR | Facility: CLINIC | Age: 69
End: 2019-06-27
Payer: MEDICARE

## 2019-06-27 VITALS
TEMPERATURE: 98.7 F | DIASTOLIC BLOOD PRESSURE: 68 MMHG | BODY MASS INDEX: 31.85 KG/M2 | HEART RATE: 81 BPM | OXYGEN SATURATION: 97 % | HEIGHT: 63.39 IN | SYSTOLIC BLOOD PRESSURE: 142 MMHG | WEIGHT: 181.99 LBS

## 2019-06-27 DIAGNOSIS — Z87.891 PERSONAL HISTORY OF NICOTINE DEPENDENCE: ICD-10-CM

## 2019-06-27 PROCEDURE — 93000 ELECTROCARDIOGRAM COMPLETE: CPT

## 2019-06-27 PROCEDURE — 99213 OFFICE O/P EST LOW 20 MIN: CPT

## 2019-06-27 NOTE — HISTORY OF PRESENT ILLNESS
[FreeTextEntry1] : Feeling generally well. Continuing to lose weight slowly. Has not been exercising regularly. BP at home typically as high as 150/90 mmHg in morning, and lower later in the day.\par

## 2019-06-27 NOTE — PHYSICAL EXAM
[Auscultation Breath Sounds / Voice Sounds] : lungs were clear to auscultation bilaterally [Heart Sounds] : normal S1 and S2 [Edema] : no peripheral edema present

## 2019-06-28 RX ORDER — FUROSEMIDE 40 MG/1
40 TABLET ORAL DAILY
Refills: 0 | Status: DISCONTINUED | COMMUNITY
End: 2019-06-28

## 2019-07-03 ENCOUNTER — RX RENEWAL (OUTPATIENT)
Age: 69
End: 2019-07-03

## 2019-07-09 ENCOUNTER — OUTPATIENT (OUTPATIENT)
Dept: OUTPATIENT SERVICES | Facility: HOSPITAL | Age: 69
LOS: 1 days | End: 2019-07-09
Payer: MEDICARE

## 2019-07-09 ENCOUNTER — APPOINTMENT (OUTPATIENT)
Dept: MAMMOGRAPHY | Facility: HOSPITAL | Age: 69
End: 2019-07-09
Payer: MEDICARE

## 2019-07-09 PROCEDURE — G0279: CPT

## 2019-07-09 PROCEDURE — 77065 DX MAMMO INCL CAD UNI: CPT | Mod: 26,RT

## 2019-07-09 PROCEDURE — G0279: CPT | Mod: 26

## 2019-07-09 PROCEDURE — 77065 DX MAMMO INCL CAD UNI: CPT

## 2019-07-15 ENCOUNTER — APPOINTMENT (OUTPATIENT)
Dept: RHEUMATOLOGY | Facility: CLINIC | Age: 69
End: 2019-07-15
Payer: MEDICARE

## 2019-07-15 ENCOUNTER — LABORATORY RESULT (OUTPATIENT)
Age: 69
End: 2019-07-15

## 2019-07-15 VITALS
TEMPERATURE: 98.8 F | DIASTOLIC BLOOD PRESSURE: 70 MMHG | HEIGHT: 63.39 IN | OXYGEN SATURATION: 100 % | BODY MASS INDEX: 31.32 KG/M2 | WEIGHT: 179 LBS | HEART RATE: 65 BPM | SYSTOLIC BLOOD PRESSURE: 138 MMHG

## 2019-07-15 PROCEDURE — 36415 COLL VENOUS BLD VENIPUNCTURE: CPT

## 2019-07-15 PROCEDURE — 99214 OFFICE O/P EST MOD 30 MIN: CPT | Mod: 25

## 2019-07-16 ENCOUNTER — CHART COPY (OUTPATIENT)
Age: 69
End: 2019-07-16

## 2019-07-16 NOTE — HISTORY OF PRESENT ILLNESS
[___ Week(s) Ago] : [unfilled] week(s) ago [FreeTextEntry1] : Office Visit 4/18/19:\par Joints feel good. Denies pain or swelling. \par Plan: Doing well \par Continue triple therapy. \par Check labs. \par \par Office Visit 1/18/19:\par Doing well, no joint pains \par No joint pain / swelling / stiffness \par Plan: Doing well at this time. \par No flares. \par Losing weight. Continue to encourage exercise. \par Continue current regimen of triple therapy. \par \par Office Visit 10/181/8:\par Feels well overall \par No symptoms today - denies swelling/stiffness in joints\par Plan: Doing well. \par Continue triple therapy. \par Check labs. \par Continue vitamin d and dietary calcium. \par \par Office Visit 7/19/18:\par Notes pain in her R shoulder, rarely in L also. Points to subacromial bursae when asked for site of pain. Not worse with any movements. She takes Hydrocodone for back pain and states this relieves her shoulder pain. At night time she will take Tylonel usually. \par States she is active, walks about twenty blocks a day. \par Overall feeling better and without complaints. \par Feels at her normal baseline functional status at this time. \par Plan: Doing well \par Continue current regimen\par Check labs for monitoring toxicity on current regimen. \par \par Office Visit 4/17/18:\par Patient states she feels well and denies any joint complaints today \par States her prior shoulder pain feels much better\par Currently has a cold\par Mild leukocytosis and stable anemia on lab work\par Markers of disease activity trending down \par No swelling or morning stiffness in any joints\par Plan: Continue triple therapy for now given remarkable improvement both clinically and serologically. \par Check labs for monitoring of disease activity and toxicities from MTX and SSZ. \par \par Office Visit 2/26/18:\par Patient with elevated ESR/CRP on last set of labs, mild translation of C spine on XR and nodulosis. No active arthritis. Discussed that we should escalate therapy in this setting however she was resistant to doing so at this time. She refuses to take injectable therapies. We discussed the possibility of adding Sulfasalzine for triple therapy and she stated she would think about it. She presents today and agrees to try this. \par Denies any joint pains / swelling / stiffness today. \par No new nodules. \par Plan: Given suboptimally controlled disease with elevated acute phase reactants, C spine translation and nodulosis will add Sulfasalazine 500mg BID for now. Patient to return in two weeks to re check CBC. \par \par Office Visit 2/8/17:\par Patient states she feels well. \par Notes ongoing pain in her trapezius muscles but not interested in trying a C Spine pillow as I think this has to do with the way she sleeps. \par Not interested in PT\par SC nodule on her elbow is resolving. \par Denies swelling or morning stiffness in any joints. \par Plan: She is currently happy with her medication regimen. Her arthritis is not active and thus will continue her current medication regimen of MTX 20mg and folic acid 2mg daily along with Plaquenil 200mg daily. \par Check CBC, CMP, ESR and CRP \par She never went to have the US of her SC nodule on her arm but is not interested in doing so. \par Given her kyphosis and neck pain, I have encourage patient to try PT and a C spine pillow to correct the way she sleeps. She is not interested in trying the pillow but will consider PT pending what is available. \par Management of back pain per spine specialist. \par \par Office Visit 11/9/17:\par High titre RF on last set of labs.\par Denies swelling or morning stiffness in any joints today. \par Overall has felt great since her epidural injections (by pain specialist) \par Has a nodular bony lesion overlying L forearm which does not bother her currently. States she has had this for many years and it is more painful during disease flares. \par Plan: \par -Patient currently on MTX 20mg and folic acid 2mg daily along with Plaquenil 200mg daily and has done well on this regimen. She has no flares, and eventually I think we can consider tapering down her MTX. \par -She has ongoing back pain but states that her pain is under control with her Fentanyl patch and hydrocodone. \par -Optho screening UTD for plaquenil toxicity \par -US nodular lesion on forearm  \par Office visit 9/7/17:\par Patient diagnosed with rheumatoid arthritis (unclear serologies) twenty years ago though she only started therapy in 2009 and has been on a stable dose of MTX and Plaquenil since. She states that she initially had swelling and morning stiffness in her MCPs and wrists. Currently she denies any flares - no swelling or morning stiffness in any of her joints. She states she has essentially been well controlled since 2014, and this is consistent with her notes from Dr. Mueller. Though I have no recent lab results or serologies. \par Patient has a herniated disc in her back which causes pain, has difficulty standing for prolonged periods and walking more than 5 blocks. She sees a pain specialist and has a Fentanyl patch as well as Hydrocodone tablets, for which she takes up to 5 a day. \par Recent evaluation for lower extremity swelling, has a history of vein ablation and sclerotherapy in bilateral lower extremities. She had been started on Lasix which has partially resolved the swelling and she had a US last week which ruled out a DVT. \par Patient fully independent with ADLs and iADLS.

## 2019-07-16 NOTE — ASSESSMENT
[FreeTextEntry1] : 69 year old female presents for evaluation of previously diagnosed seropositive nonerosive rheumatoid arthritis and lower back pain presents for follow up. \par Doing well. Continue triple therapy. \par Check labs\par PT referral\par \par

## 2019-07-16 NOTE — PHYSICAL EXAM
[General Appearance - Alert] : alert [General Appearance - In No Acute Distress] : in no acute distress [Sclera] : the sclera and conjunctiva were normal [Outer Ear] : the ears and nose were normal in appearance [Hearing Threshold Finger Rub Not [place]] : hearing was normal [Examination Of The Oral Cavity] : the lips and gums were normal [Neck Cervical Mass (___cm)] : no neck mass was observed [Respiration, Rhythm And Depth] : normal respiratory rhythm and effort [Auscultation Breath Sounds / Voice Sounds] : lungs were clear to auscultation bilaterally [Heart Rate And Rhythm] : heart rate was normal and rhythm regular [Heart Sounds] : normal S1 and S2 [Bowel Sounds] : normal bowel sounds [Abdomen Soft] : soft [Abdomen Tenderness] : non-tender [Cervical Lymph Nodes Enlarged Posterior Bilaterally] : posterior cervical [Cervical Lymph Nodes Enlarged Anterior Bilaterally] : anterior cervical [No Spinal Tenderness] : no spinal tenderness [Musculoskeletal - Swelling] : no joint swelling seen [Motor Tone] : muscle strength and tone were normal [] : no rash [Sensation] : the sensory exam was normal to light touch and pinprick [Motor Exam] : the motor exam was normal [Oriented To Time, Place, And Person] : oriented to person, place, and time [Affect] : the affect was normal [Mood] : the mood was normal [FreeTextEntry1] : Slow gait. No synovitis.

## 2019-07-17 LAB
ALBUMIN SERPL ELPH-MCNC: 4.2 G/DL
ALP BLD-CCNC: 56 U/L
ALT SERPL-CCNC: 9 U/L
ANION GAP SERPL CALC-SCNC: 14 MMOL/L
AST SERPL-CCNC: 17 U/L
BASOPHILS # BLD AUTO: 0.06 K/UL
BASOPHILS NFR BLD AUTO: 0.9 %
BILIRUB SERPL-MCNC: 0.3 MG/DL
BUN SERPL-MCNC: 22 MG/DL
CALCIUM SERPL-MCNC: 10 MG/DL
CHLORIDE SERPL-SCNC: 99 MMOL/L
CO2 SERPL-SCNC: 28 MMOL/L
CREAT SERPL-MCNC: 1.76 MG/DL
CRP SERPL-MCNC: 4.2 MG/DL
EOSINOPHIL # BLD AUTO: 0.11 K/UL
EOSINOPHIL NFR BLD AUTO: 1.6 %
ERYTHROCYTE [SEDIMENTATION RATE] IN BLOOD BY WESTERGREN METHOD: 80 MM/HR
GLUCOSE SERPL-MCNC: 151 MG/DL
HCT VFR BLD CALC: 29.8 %
HGB BLD-MCNC: 9.1 G/DL
IMM GRANULOCYTES NFR BLD AUTO: 0.3 %
LYMPHOCYTES # BLD AUTO: 1.66 K/UL
LYMPHOCYTES NFR BLD AUTO: 23.8 %
MAN DIFF?: NORMAL
MCHC RBC-ENTMCNC: 28.4 PG
MCHC RBC-ENTMCNC: 30.5 GM/DL
MCV RBC AUTO: 93.1 FL
MONOCYTES # BLD AUTO: 0.5 K/UL
MONOCYTES NFR BLD AUTO: 7.2 %
NEUTROPHILS # BLD AUTO: 4.63 K/UL
NEUTROPHILS NFR BLD AUTO: 66.2 %
PLATELET # BLD AUTO: 267 K/UL
POTASSIUM SERPL-SCNC: 3.3 MMOL/L
PROT SERPL-MCNC: 7.2 G/DL
RBC # BLD: 3.2 M/UL
RBC # FLD: 13.8 %
SODIUM SERPL-SCNC: 141 MMOL/L
WBC # FLD AUTO: 6.98 K/UL

## 2019-08-19 ENCOUNTER — APPOINTMENT (OUTPATIENT)
Dept: RHEUMATOLOGY | Facility: CLINIC | Age: 69
End: 2019-08-19
Payer: MEDICARE

## 2019-08-19 VITALS
TEMPERATURE: 98.9 F | BODY MASS INDEX: 32.6 KG/M2 | HEART RATE: 60 BPM | OXYGEN SATURATION: 98 % | WEIGHT: 184 LBS | HEIGHT: 63 IN | SYSTOLIC BLOOD PRESSURE: 145 MMHG | DIASTOLIC BLOOD PRESSURE: 74 MMHG

## 2019-08-19 PROCEDURE — 99214 OFFICE O/P EST MOD 30 MIN: CPT

## 2019-08-20 NOTE — REVIEW OF SYSTEMS
[As Noted in HPI] : as noted in HPI [Chills] : no chills [Fever] : no fever [Eye Pain] : no eye pain [Red Eyes] : eyes not red [Dry Eyes] : no dryness of the eyes [Nasal Discharge] : no nasal discharge [Sore Throat] : no sore throat [Chest Pain] : no chest pain [Leg Claudication] : no intermittent leg claudication [Palpitations] : no palpitations [Lower Ext Edema] : no lower extremity edema [Shortness Of Breath] : no shortness of breath [Wheezing] : no wheezing [SOB on Exertion] : no shortness of breath during exertion [Cough] : no cough [Abdominal Pain] : no abdominal pain [Vomiting] : no vomiting [Constipation] : no constipation [Diarrhea] : no diarrhea [Incontinence] : no incontinence [Dysuria] : no dysuria [Skin Lesions] : no skin lesions [Dizziness] : no dizziness [Fainting] : no fainting [Anxiety] : no anxiety [Depression] : no depression

## 2019-08-20 NOTE — ASSESSMENT
[FreeTextEntry1] : 69 year old female presents for evaluation of previously diagnosed seropositive nonerosive rheumatoid arthritis and lower back pain presents for follow up. \par Doing well on triple therapy. \par Continue current medication regimen. \par Continue PT

## 2019-08-20 NOTE — PHYSICAL EXAM
[General Appearance - Alert] : alert [General Appearance - In No Acute Distress] : in no acute distress [Sclera] : the sclera and conjunctiva were normal [Outer Ear] : the ears and nose were normal in appearance [Hearing Threshold Finger Rub Not [place]] : hearing was normal [Examination Of The Oral Cavity] : the lips and gums were normal [Neck Cervical Mass (___cm)] : no neck mass was observed [Respiration, Rhythm And Depth] : normal respiratory rhythm and effort [Auscultation Breath Sounds / Voice Sounds] : lungs were clear to auscultation bilaterally [Heart Rate And Rhythm] : heart rate was normal and rhythm regular [Heart Sounds] : normal S1 and S2 [Bowel Sounds] : normal bowel sounds [Abdomen Soft] : soft [Abdomen Tenderness] : non-tender [Cervical Lymph Nodes Enlarged Posterior Bilaterally] : posterior cervical [Cervical Lymph Nodes Enlarged Anterior Bilaterally] : anterior cervical [No Spinal Tenderness] : no spinal tenderness [Motor Tone] : muscle strength and tone were normal [Musculoskeletal - Swelling] : no joint swelling seen [] : no rash [Motor Exam] : the motor exam was normal [Sensation] : the sensory exam was normal to light touch and pinprick [Oriented To Time, Place, And Person] : oriented to person, place, and time [Affect] : the affect was normal [Mood] : the mood was normal [FreeTextEntry1] : Slow gait. No synovitis.

## 2019-08-20 NOTE — HISTORY OF PRESENT ILLNESS
[___ Week(s) Ago] : [unfilled] week(s) ago [FreeTextEntry1] : 69 year old female presents for evaluation of previously diagnosed rheumatoid arthritis and lower back pain. \par \par Current Medications:\par MTX 20mg weekly with 2mg folic acid daily \par Plaquenil 200mg twice daily (ophtho 3/19) \par Sulfasalazine 500mg BID\par Hydrocodone (per pain specialist) --> Morphine derivative\par Vit D, dietary ca \par \par Office Visit 8/19/19:\par Feeling well, shoulder improved\par Going to PT twice a week \par No joint pain

## 2019-09-06 ENCOUNTER — OUTPATIENT (OUTPATIENT)
Dept: OUTPATIENT SERVICES | Facility: HOSPITAL | Age: 69
LOS: 1 days | End: 2019-09-06
Payer: MEDICARE

## 2019-09-06 ENCOUNTER — APPOINTMENT (OUTPATIENT)
Dept: MRI IMAGING | Facility: HOSPITAL | Age: 69
End: 2019-09-06
Payer: MEDICARE

## 2019-09-06 PROCEDURE — A9585: CPT

## 2019-09-06 PROCEDURE — C8937: CPT

## 2019-09-06 PROCEDURE — 77049 MRI BREAST C-+ W/CAD BI: CPT | Mod: 26

## 2019-09-06 PROCEDURE — 77049 MRI BREAST C-+ W/CAD BI: CPT

## 2019-09-26 ENCOUNTER — APPOINTMENT (OUTPATIENT)
Dept: HEART AND VASCULAR | Facility: CLINIC | Age: 69
End: 2019-09-26
Payer: MEDICARE

## 2019-09-26 VITALS
HEART RATE: 74 BPM | OXYGEN SATURATION: 98 % | HEIGHT: 63 IN | BODY MASS INDEX: 32.78 KG/M2 | WEIGHT: 184.99 LBS | TEMPERATURE: 98.7 F | SYSTOLIC BLOOD PRESSURE: 146 MMHG | DIASTOLIC BLOOD PRESSURE: 78 MMHG

## 2019-09-26 PROCEDURE — 99213 OFFICE O/P EST LOW 20 MIN: CPT

## 2019-09-26 NOTE — HISTORY OF PRESENT ILLNESS
[FreeTextEntry1] : Feels generally well. Walks daily without symptoms. Doing some light exercises. Still working on losing weight. Denied any chest pain, dyspnea, palpitations, lightheadedness.\par Patient reports taking her listed medications as directed.\par

## 2019-09-26 NOTE — PHYSICAL EXAM
[Auscultation Breath Sounds / Voice Sounds] : lungs were clear to auscultation bilaterally [General Appearance - In No Acute Distress] : no acute distress [Heart Sounds] : normal S1 and S2 [Edema] : no peripheral edema present

## 2019-10-25 ENCOUNTER — OUTPATIENT (OUTPATIENT)
Dept: OUTPATIENT SERVICES | Facility: HOSPITAL | Age: 69
LOS: 1 days | End: 2019-10-25
Payer: MEDICARE

## 2019-10-25 PROCEDURE — 71046 X-RAY EXAM CHEST 2 VIEWS: CPT

## 2019-10-25 PROCEDURE — 71046 X-RAY EXAM CHEST 2 VIEWS: CPT | Mod: 26

## 2019-11-20 ENCOUNTER — OUTPATIENT (OUTPATIENT)
Dept: OUTPATIENT SERVICES | Facility: HOSPITAL | Age: 69
LOS: 1 days | End: 2019-11-20
Payer: MEDICARE

## 2019-11-20 ENCOUNTER — APPOINTMENT (OUTPATIENT)
Dept: RADIOLOGY | Facility: HOSPITAL | Age: 69
End: 2019-11-20
Payer: MEDICARE

## 2019-11-20 PROCEDURE — 77080 DXA BONE DENSITY AXIAL: CPT

## 2019-11-20 PROCEDURE — 77080 DXA BONE DENSITY AXIAL: CPT | Mod: 26

## 2019-12-03 ENCOUNTER — APPOINTMENT (OUTPATIENT)
Dept: RHEUMATOLOGY | Facility: CLINIC | Age: 69
End: 2019-12-03
Payer: MEDICARE

## 2019-12-03 ENCOUNTER — LABORATORY RESULT (OUTPATIENT)
Age: 69
End: 2019-12-03

## 2019-12-03 VITALS
TEMPERATURE: 98.5 F | HEART RATE: 72 BPM | BODY MASS INDEX: 32.78 KG/M2 | HEIGHT: 63 IN | OXYGEN SATURATION: 95 % | DIASTOLIC BLOOD PRESSURE: 80 MMHG | SYSTOLIC BLOOD PRESSURE: 133 MMHG | WEIGHT: 185 LBS

## 2019-12-03 PROCEDURE — 99214 OFFICE O/P EST MOD 30 MIN: CPT

## 2019-12-04 NOTE — HISTORY OF PRESENT ILLNESS
[___ Week(s) Ago] : [unfilled] week(s) ago [FreeTextEntry1] : Office Visit 8/19/19:\par Feeling well, shoulder improved\par Going to PT twice a week \par No joint pain \par Plan: Doing well on triple therapy. \par Continue current medication regimen. \par Continue PT \par \par Office Visit 7/15/19:\par No flares\par Doing well \par Plan: Doing well. Continue triple therapy. \par Check labs\par PT referral\par \par Office Visit 4/18/19:\par Joints feel good. Denies pain or swelling. \par Plan: Doing well \par Continue triple therapy. \par Check labs. \par \par Office Visit 1/18/19:\par Doing well, no joint pains \par No joint pain / swelling / stiffness \par Plan: Doing well at this time. \par No flares. \par Losing weight. Continue to encourage exercise. \par Continue current regimen of triple therapy. \par \par Office Visit 10/181/8:\par Feels well overall \par No symptoms today - denies swelling/stiffness in joints\par Plan: Doing well. \par Continue triple therapy. \par Check labs. \par Continue vitamin d and dietary calcium. \par \par Office Visit 7/19/18:\par Notes pain in her R shoulder, rarely in L also. Points to subacromial bursae when asked for site of pain. Not worse with any movements. She takes Hydrocodone for back pain and states this relieves her shoulder pain. At night time she will take Tylonel usually. \par States she is active, walks about twenty blocks a day. \par Overall feeling better and without complaints. \par Feels at her normal baseline functional status at this time. \par Plan: Doing well \par Continue current regimen\par Check labs for monitoring toxicity on current regimen. \par \par Office Visit 4/17/18:\par Patient states she feels well and denies any joint complaints today \par States her prior shoulder pain feels much better\par Currently has a cold\par Mild leukocytosis and stable anemia on lab work\par Markers of disease activity trending down \par No swelling or morning stiffness in any joints\par Plan: Continue triple therapy for now given remarkable improvement both clinically and serologically. \par Check labs for monitoring of disease activity and toxicities from MTX and SSZ. \par \par Office Visit 2/26/18:\par Patient with elevated ESR/CRP on last set of labs, mild translation of C spine on XR and nodulosis. No active arthritis. Discussed that we should escalate therapy in this setting however she was resistant to doing so at this time. She refuses to take injectable therapies. We discussed the possibility of adding Sulfasalzine for triple therapy and she stated she would think about it. She presents today and agrees to try this. \par Denies any joint pains / swelling / stiffness today. \par No new nodules. \par Plan: Given suboptimally controlled disease with elevated acute phase reactants, C spine translation and nodulosis will add Sulfasalazine 500mg BID for now. Patient to return in two weeks to re check CBC. \par \par Office Visit 2/8/17:\par Patient states she feels well. \par Notes ongoing pain in her trapezius muscles but not interested in trying a C Spine pillow as I think this has to do with the way she sleeps. \par Not interested in PT\par SC nodule on her elbow is resolving. \par Denies swelling or morning stiffness in any joints. \par Plan: She is currently happy with her medication regimen. Her arthritis is not active and thus will continue her current medication regimen of MTX 20mg and folic acid 2mg daily along with Plaquenil 200mg daily. \par Check CBC, CMP, ESR and CRP \par She never went to have the US of her SC nodule on her arm but is not interested in doing so. \par Given her kyphosis and neck pain, I have encourage patient to try PT and a C spine pillow to correct the way she sleeps. She is not interested in trying the pillow but will consider PT pending what is available. \par Management of back pain per spine specialist. \par \par Office Visit 11/9/17:\par High titre RF on last set of labs.\par Denies swelling or morning stiffness in any joints today. \par Overall has felt great since her epidural injections (by pain specialist) \par Has a nodular bony lesion overlying L forearm which does not bother her currently. States she has had this for many years and it is more painful during disease flares. \par Plan: \par -Patient currently on MTX 20mg and folic acid 2mg daily along with Plaquenil 200mg daily and has done well on this regimen. She has no flares, and eventually I think we can consider tapering down her MTX. \par -She has ongoing back pain but states that her pain is under control with her Fentanyl patch and hydrocodone. \par -Optho screening UTD for plaquenil toxicity \par -US nodular lesion on forearm  \par Office visit 9/7/17:\par Patient diagnosed with rheumatoid arthritis (unclear serologies) twenty years ago though she only started therapy in 2009 and has been on a stable dose of MTX and Plaquenil since. She states that she initially had swelling and morning stiffness in her MCPs and wrists. Currently she denies any flares - no swelling or morning stiffness in any of her joints. She states she has essentially been well controlled since 2014, and this is consistent with her notes from Dr. Mueller. Though I have no recent lab results or serologies. \par Patient has a herniated disc in her back which causes pain, has difficulty standing for prolonged periods and walking more than 5 blocks. She sees a pain specialist and has a Fentanyl patch as well as Hydrocodone tablets, for which she takes up to 5 a day. \par Recent evaluation for lower extremity swelling, has a history of vein ablation and sclerotherapy in bilateral lower extremities. She had been started on Lasix which has partially resolved the swelling and she had a US last week which ruled out a DVT. \par Patient fully independent with ADLs and iADLS.

## 2019-12-04 NOTE — PHYSICAL EXAM
[General Appearance - Alert] : alert [General Appearance - In No Acute Distress] : in no acute distress [Hearing Threshold Finger Rub Not [place]] : hearing was normal [Sclera] : the sclera and conjunctiva were normal [Outer Ear] : the ears and nose were normal in appearance [Neck Cervical Mass (___cm)] : no neck mass was observed [Examination Of The Oral Cavity] : the lips and gums were normal [Auscultation Breath Sounds / Voice Sounds] : lungs were clear to auscultation bilaterally [Heart Rate And Rhythm] : heart rate was normal and rhythm regular [Respiration, Rhythm And Depth] : normal respiratory rhythm and effort [Bowel Sounds] : normal bowel sounds [Heart Sounds] : normal S1 and S2 [Abdomen Tenderness] : non-tender [Abdomen Soft] : soft [Cervical Lymph Nodes Enlarged Posterior Bilaterally] : posterior cervical [Cervical Lymph Nodes Enlarged Anterior Bilaterally] : anterior cervical [Musculoskeletal - Swelling] : no joint swelling seen [Motor Tone] : muscle strength and tone were normal [No Spinal Tenderness] : no spinal tenderness [] : no rash [Sensation] : the sensory exam was normal to light touch and pinprick [Oriented To Time, Place, And Person] : oriented to person, place, and time [Motor Exam] : the motor exam was normal [Affect] : the affect was normal [Mood] : the mood was normal [FreeTextEntry1] : Slow gait. No synovitis.

## 2019-12-04 NOTE — ASSESSMENT
[FreeTextEntry1] : 69 year old female presents for evaluation of previously diagnosed seropositive nonerosive rheumatoid arthritis and lower back pain presents for follow up. \par Doing well - re check ESR/CRP today. Unlikely associated with arthritis given no clinically significant findings on history or exam to suggest a flare. \par Patient to follow up with nephrology next week. \par Continue triple therapy.

## 2019-12-06 LAB
25(OH)D3 SERPL-MCNC: 55.2 NG/ML
ALBUMIN SERPL ELPH-MCNC: 4.7 G/DL
ALBUPE: 25.4 %
ALP BLD-CCNC: 53 U/L
ALPHA1UPE: 29.1 %
ALPHA2UPE: 16.5 %
ALT SERPL-CCNC: 8 U/L
ANION GAP SERPL CALC-SCNC: 16 MMOL/L
APPEARANCE: ABNORMAL
AST SERPL-CCNC: 14 U/L
BACTERIA: ABNORMAL
BASOPHILS # BLD AUTO: 0.05 K/UL
BASOPHILS NFR BLD AUTO: 0.6 %
BETAUPE: 14.3 %
BILIRUB SERPL-MCNC: 0.2 MG/DL
BILIRUBIN URINE: NEGATIVE
BLOOD URINE: NEGATIVE
BUN SERPL-MCNC: 35 MG/DL
CALCIUM SERPL-MCNC: 10.5 MG/DL
CHLORIDE SERPL-SCNC: 99 MMOL/L
CO2 SERPL-SCNC: 26 MMOL/L
COLOR: YELLOW
CREAT 24H UR-MCNC: NORMAL G/24 H
CREAT SERPL-MCNC: 1.92 MG/DL
CREATININE UR (MAYO): 117 MG/DL
CRP SERPL-MCNC: 0.21 MG/DL
EOSINOPHIL # BLD AUTO: 0.15 K/UL
EOSINOPHIL NFR BLD AUTO: 1.9 %
ERYTHROCYTE [SEDIMENTATION RATE] IN BLOOD BY WESTERGREN METHOD: 35 MM/HR
GAMMAUPE: 14.7 %
GLUCOSE QUALITATIVE U: NEGATIVE
GLUCOSE SERPL-MCNC: 119 MG/DL
HCT VFR BLD CALC: 32.9 %
HGB BLD-MCNC: 10.1 G/DL
HYALINE CASTS: 0 /LPF
IGA 24H UR QL IFE: NORMAL
IMM GRANULOCYTES NFR BLD AUTO: 0.5 %
KAPPA LC 24H UR QL: NORMAL
KETONES URINE: NEGATIVE
LDH SERPL-CCNC: 187 U/L
LEUKOCYTE ESTERASE URINE: ABNORMAL
LYMPHOCYTES # BLD AUTO: 1.86 K/UL
LYMPHOCYTES NFR BLD AUTO: 23.8 %
MAN DIFF?: NORMAL
MCHC RBC-ENTMCNC: 28.7 PG
MCHC RBC-ENTMCNC: 30.7 GM/DL
MCV RBC AUTO: 93.5 FL
MICROSCOPIC-UA: NORMAL
MONOCYTES # BLD AUTO: 0.72 K/UL
MONOCYTES NFR BLD AUTO: 9.2 %
NEUTROPHILS # BLD AUTO: 4.98 K/UL
NEUTROPHILS NFR BLD AUTO: 64 %
NITRITE URINE: NEGATIVE
PH URINE: 6
PLATELET # BLD AUTO: 233 K/UL
POTASSIUM SERPL-SCNC: 3.4 MMOL/L
PROT PATTERN 24H UR ELPH-IMP: NORMAL
PROT SERPL-MCNC: 7 G/DL
PROT UR-MCNC: 33 MG/DL
PROT UR-MCNC: 33 MG/DL
PROTEIN URINE: ABNORMAL
RBC # BLD: 3.52 M/UL
RBC # FLD: 14.6 %
RED BLOOD CELLS URINE: 2 /HPF
SODIUM SERPL-SCNC: 141 MMOL/L
SPECIFIC GRAVITY URINE: 1.02
SPECIMEN VOL 24H UR: NORMAL ML
SQUAMOUS EPITHELIAL CELLS: 8 /HPF
UROBILINOGEN URINE: NORMAL
WBC # FLD AUTO: 7.8 K/UL
WHITE BLOOD CELLS URINE: 102 /HPF

## 2019-12-18 LAB
ALBUMIN MFR SERPL ELPH: 58.7 %
ALBUMIN SERPL-MCNC: 4.1 G/DL
ALBUMIN/GLOB SERPL: 1.4 RATIO
ALPHA1 GLOB MFR SERPL ELPH: 4.5 %
ALPHA1 GLOB SERPL ELPH-MCNC: 0.3 G/DL
ALPHA2 GLOB MFR SERPL ELPH: 11.7 %
ALPHA2 GLOB SERPL ELPH-MCNC: 0.8 G/DL
B-GLOBULIN MFR SERPL ELPH: 11.7 %
B-GLOBULIN SERPL ELPH-MCNC: 0.8 G/DL
GAMMA GLOB FLD ELPH-MCNC: 0.9 G/DL
GAMMA GLOB MFR SERPL ELPH: 13.4 %
INTERPRETATION SERPL IEP-IMP: NORMAL
M PROTEIN MFR SERPL ELPH: 2.6 %
MONOCLON BAND OBS SERPL: 0.2 G/DL
PROT SERPL-MCNC: 7 G/DL
PROT SERPL-MCNC: 7 G/DL

## 2019-12-26 ENCOUNTER — APPOINTMENT (OUTPATIENT)
Dept: HEART AND VASCULAR | Facility: CLINIC | Age: 69
End: 2019-12-26
Payer: MEDICARE

## 2019-12-26 VITALS
BODY MASS INDEX: 32.6 KG/M2 | HEART RATE: 57 BPM | HEIGHT: 63 IN | WEIGHT: 184 LBS | SYSTOLIC BLOOD PRESSURE: 140 MMHG | TEMPERATURE: 98.9 F | DIASTOLIC BLOOD PRESSURE: 54 MMHG | OXYGEN SATURATION: 98 %

## 2019-12-26 PROCEDURE — 99213 OFFICE O/P EST LOW 20 MIN: CPT

## 2019-12-26 NOTE — REASON FOR VISIT
[FreeTextEntry1] : Feels generally well. Walking most days (about 20 blocks) and doing occ other exercise.\par BP improved at home on current meds. Weight essentially unchanged.\par Most recent creatinine elevated, and seen by nephrologist (outside of [facility], so records not available).

## 2020-01-16 ENCOUNTER — APPOINTMENT (OUTPATIENT)
Dept: HEART AND VASCULAR | Facility: CLINIC | Age: 70
End: 2020-01-16
Payer: MEDICARE

## 2020-01-16 VITALS
HEIGHT: 63 IN | SYSTOLIC BLOOD PRESSURE: 120 MMHG | BODY MASS INDEX: 33.31 KG/M2 | WEIGHT: 187.99 LBS | TEMPERATURE: 98.4 F | DIASTOLIC BLOOD PRESSURE: 70 MMHG | OXYGEN SATURATION: 98 %

## 2020-01-16 PROCEDURE — 99213 OFFICE O/P EST LOW 20 MIN: CPT

## 2020-01-16 PROCEDURE — 93000 ELECTROCARDIOGRAM COMPLETE: CPT

## 2020-01-16 NOTE — HISTORY OF PRESENT ILLNESS
[FreeTextEntry1] : Feeling well. No further progress with weight loss. Seeing nephrologist at [facility] with w/u in progress. By patient's report, creatinine on Jan 9 down to 1.67 mg/dl.\par BP at home in 140s/80s.\par Patient reports taking her listed medications as directed.\par

## 2020-03-03 ENCOUNTER — APPOINTMENT (OUTPATIENT)
Dept: RHEUMATOLOGY | Facility: CLINIC | Age: 70
End: 2020-03-03
Payer: MEDICARE

## 2020-03-03 VITALS
WEIGHT: 184 LBS | HEART RATE: 65 BPM | BODY MASS INDEX: 32.6 KG/M2 | DIASTOLIC BLOOD PRESSURE: 79 MMHG | SYSTOLIC BLOOD PRESSURE: 137 MMHG | TEMPERATURE: 98.3 F | HEIGHT: 63 IN | OXYGEN SATURATION: 98 %

## 2020-03-03 PROCEDURE — 99213 OFFICE O/P EST LOW 20 MIN: CPT | Mod: 25

## 2020-03-03 PROCEDURE — 36415 COLL VENOUS BLD VENIPUNCTURE: CPT

## 2020-03-03 NOTE — ASSESSMENT
[FreeTextEntry1] : 69 year old female presents for evaluation of previously diagnosed seropositive nonerosive rheumatoid arthritis and lower back pain presents for follow up. \par Condition stable on triple therapy - continue\par Has been going to PT which is helping\par Check labs.

## 2020-03-03 NOTE — PHYSICAL EXAM
[General Appearance - Alert] : alert [General Appearance - In No Acute Distress] : in no acute distress [Sclera] : the sclera and conjunctiva were normal [Outer Ear] : the ears and nose were normal in appearance [Hearing Threshold Finger Rub Not [place]] : hearing was normal [Examination Of The Oral Cavity] : the lips and gums were normal [Neck Cervical Mass (___cm)] : no neck mass was observed [Heart Rate And Rhythm] : heart rate was normal and rhythm regular [Auscultation Breath Sounds / Voice Sounds] : lungs were clear to auscultation bilaterally [Respiration, Rhythm And Depth] : normal respiratory rhythm and effort [Bowel Sounds] : normal bowel sounds [Heart Sounds] : normal S1 and S2 [Abdomen Tenderness] : non-tender [Abdomen Soft] : soft [Cervical Lymph Nodes Enlarged Anterior Bilaterally] : anterior cervical [Cervical Lymph Nodes Enlarged Posterior Bilaterally] : posterior cervical [No Spinal Tenderness] : no spinal tenderness [Motor Tone] : muscle strength and tone were normal [Musculoskeletal - Swelling] : no joint swelling seen [Motor Exam] : the motor exam was normal [Sensation] : the sensory exam was normal to light touch and pinprick [] : no rash [Affect] : the affect was normal [Oriented To Time, Place, And Person] : oriented to person, place, and time [Mood] : the mood was normal [FreeTextEntry1] : Slow gait. No synovitis.

## 2020-03-03 NOTE — REVIEW OF SYSTEMS
[As Noted in HPI] : as noted in HPI [Chills] : no chills [Fever] : no fever [Eye Pain] : no eye pain [Red Eyes] : eyes not red [Dry Eyes] : no dryness of the eyes [Nasal Discharge] : no nasal discharge [Sore Throat] : no sore throat [Palpitations] : no palpitations [Chest Pain] : no chest pain [Leg Claudication] : no intermittent leg claudication [Lower Ext Edema] : no lower extremity edema [Shortness Of Breath] : no shortness of breath [Wheezing] : no wheezing [Cough] : no cough [Abdominal Pain] : no abdominal pain [Vomiting] : no vomiting [SOB on Exertion] : no shortness of breath during exertion [Constipation] : no constipation [Diarrhea] : no diarrhea [Incontinence] : no incontinence [Dysuria] : no dysuria [Skin Lesions] : no skin lesions [Dizziness] : no dizziness [Fainting] : no fainting [Anxiety] : no anxiety [Depression] : no depression

## 2020-03-03 NOTE — HISTORY OF PRESENT ILLNESS
[___ Month(s) Ago] : [unfilled] month(s) ago [FreeTextEntry1] : Office Visit 12/4/19:\par Last set of labs with elevated ESR/CRP and rising creatinine \par She has an appt with nephro (Dr. Robledo at [facility]) next week \par Denies joint pain, swelling or stiffness \par Shoulder pain has improved with PT\par Plan: Doing well - re check ESR/CRP today. Unlikely associated with arthritis given no clinically significant findings on history or exam to suggest a flare. \par Patient to follow up with nephrology next week. \par Continue triple therapy.\par \par Office Visit 8/19/19:\par Feeling well, shoulder improved\par Going to PT twice a week \par No joint pain \par Plan: Doing well on triple therapy. \par Continue current medication regimen. \par Continue PT \par \par Office Visit 7/15/19:\par No flares\par Doing well \par Plan: Doing well. Continue triple therapy. \par Check labs\par PT referral\par \par Office Visit 4/18/19:\par Joints feel good. Denies pain or swelling. \par Plan: Doing well \par Continue triple therapy. \par Check labs. \par \par Office Visit 1/18/19:\par Doing well, no joint pains \par No joint pain / swelling / stiffness \par Plan: Doing well at this time. \par No flares. \par Losing weight. Continue to encourage exercise. \par Continue current regimen of triple therapy. \par \par Office Visit 10/181/8:\par Feels well overall \par No symptoms today - denies swelling/stiffness in joints\par Plan: Doing well. \par Continue triple therapy. \par Check labs. \par Continue vitamin d and dietary calcium. \par \par Office Visit 7/19/18:\par Notes pain in her R shoulder, rarely in L also. Points to subacromial bursae when asked for site of pain. Not worse with any movements. She takes Hydrocodone for back pain and states this relieves her shoulder pain. At night time she will take Tylonel usually. \par States she is active, walks about twenty blocks a day. \par Overall feeling better and without complaints. \par Feels at her normal baseline functional status at this time. \par Plan: Doing well \par Continue current regimen\par Check labs for monitoring toxicity on current regimen. \par \par Office Visit 4/17/18:\par Patient states she feels well and denies any joint complaints today \par States her prior shoulder pain feels much better\par Currently has a cold\par Mild leukocytosis and stable anemia on lab work\par Markers of disease activity trending down \par No swelling or morning stiffness in any joints\par Plan: Continue triple therapy for now given remarkable improvement both clinically and serologically. \par Check labs for monitoring of disease activity and toxicities from MTX and SSZ. \par \par Office Visit 2/26/18:\par Patient with elevated ESR/CRP on last set of labs, mild translation of C spine on XR and nodulosis. No active arthritis. Discussed that we should escalate therapy in this setting however she was resistant to doing so at this time. She refuses to take injectable therapies. We discussed the possibility of adding Sulfasalzine for triple therapy and she stated she would think about it. She presents today and agrees to try this. \par Denies any joint pains / swelling / stiffness today. \par No new nodules. \par Plan: Given suboptimally controlled disease with elevated acute phase reactants, C spine translation and nodulosis will add Sulfasalazine 500mg BID for now. Patient to return in two weeks to re check CBC. \par \par Office Visit 2/8/17:\par Patient states she feels well. \par Notes ongoing pain in her trapezius muscles but not interested in trying a C Spine pillow as I think this has to do with the way she sleeps. \par Not interested in PT\par SC nodule on her elbow is resolving. \par Denies swelling or morning stiffness in any joints. \par Plan: She is currently happy with her medication regimen. Her arthritis is not active and thus will continue her current medication regimen of MTX 20mg and folic acid 2mg daily along with Plaquenil 200mg daily. \par Check CBC, CMP, ESR and CRP \par She never went to have the US of her SC nodule on her arm but is not interested in doing so. \par Given her kyphosis and neck pain, I have encourage patient to try PT and a C spine pillow to correct the way she sleeps. She is not interested in trying the pillow but will consider PT pending what is available. \par Management of back pain per spine specialist. \par \par Office Visit 11/9/17:\par High titre RF on last set of labs.\par Denies swelling or morning stiffness in any joints today. \par Overall has felt great since her epidural injections (by pain specialist) \par Has a nodular bony lesion overlying L forearm which does not bother her currently. States she has had this for many years and it is more painful during disease flares. \par Plan: \par -Patient currently on MTX 20mg and folic acid 2mg daily along with Plaquenil 200mg daily and has done well on this regimen. She has no flares, and eventually I think we can consider tapering down her MTX. \par -She has ongoing back pain but states that her pain is under control with her Fentanyl patch and hydrocodone. \par -Optho screening UTD for plaquenil toxicity \par -US nodular lesion on forearm  \par Office visit 9/7/17:\par Patient diagnosed with rheumatoid arthritis (unclear serologies) twenty years ago though she only started therapy in 2009 and has been on a stable dose of MTX and Plaquenil since. She states that she initially had swelling and morning stiffness in her MCPs and wrists. Currently she denies any flares - no swelling or morning stiffness in any of her joints. She states she has essentially been well controlled since 2014, and this is consistent with her notes from Dr. Mueller. Though I have no recent lab results or serologies. \par Patient has a herniated disc in her back which causes pain, has difficulty standing for prolonged periods and walking more than 5 blocks. She sees a pain specialist and has a Fentanyl patch as well as Hydrocodone tablets, for which she takes up to 5 a day. \par Recent evaluation for lower extremity swelling, has a history of vein ablation and sclerotherapy in bilateral lower extremities. She had been started on Lasix which has partially resolved the swelling and she had a US last week which ruled out a DVT. \par Patient fully independent with ADLs and iADLS.

## 2020-03-11 LAB
ALBUMIN SERPL ELPH-MCNC: 4.3 G/DL
ALP BLD-CCNC: 69 U/L
ALT SERPL-CCNC: 11 U/L
ANION GAP SERPL CALC-SCNC: 15 MMOL/L
AST SERPL-CCNC: 19 U/L
BASOPHILS # BLD AUTO: 0.07 K/UL
BASOPHILS NFR BLD AUTO: 1.1 %
BILIRUB SERPL-MCNC: 0.2 MG/DL
BUN SERPL-MCNC: 44 MG/DL
CALCIUM SERPL-MCNC: 9.6 MG/DL
CHLORIDE SERPL-SCNC: 97 MMOL/L
CO2 SERPL-SCNC: 27 MMOL/L
CREAT SERPL-MCNC: 2.59 MG/DL
CRP SERPL-MCNC: 0.29 MG/DL
EOSINOPHIL # BLD AUTO: 0.1 K/UL
EOSINOPHIL NFR BLD AUTO: 1.5 %
ERYTHROCYTE [SEDIMENTATION RATE] IN BLOOD BY WESTERGREN METHOD: 49 MM/HR
GLUCOSE SERPL-MCNC: 102 MG/DL
HCT VFR BLD CALC: 32.7 %
HGB BLD-MCNC: 10.2 G/DL
IMM GRANULOCYTES NFR BLD AUTO: 0.5 %
LYMPHOCYTES # BLD AUTO: 1.66 K/UL
LYMPHOCYTES NFR BLD AUTO: 25.6 %
MAN DIFF?: NORMAL
MCHC RBC-ENTMCNC: 28.9 PG
MCHC RBC-ENTMCNC: 31.2 GM/DL
MCV RBC AUTO: 92.6 FL
MONOCYTES # BLD AUTO: 0.61 K/UL
MONOCYTES NFR BLD AUTO: 9.4 %
NEUTROPHILS # BLD AUTO: 4.02 K/UL
NEUTROPHILS NFR BLD AUTO: 61.9 %
PLATELET # BLD AUTO: 228 K/UL
POTASSIUM SERPL-SCNC: 3.4 MMOL/L
PROT SERPL-MCNC: 7 G/DL
RBC # BLD: 3.53 M/UL
RBC # FLD: 14.8 %
SODIUM SERPL-SCNC: 139 MMOL/L
WBC # FLD AUTO: 6.49 K/UL

## 2020-04-16 ENCOUNTER — APPOINTMENT (OUTPATIENT)
Dept: HEART AND VASCULAR | Facility: CLINIC | Age: 70
End: 2020-04-16

## 2020-04-20 ENCOUNTER — TRANSCRIPTION ENCOUNTER (OUTPATIENT)
Age: 70
End: 2020-04-20

## 2020-04-28 RX ORDER — SULFASALAZINE 500 MG/1
500 TABLET ORAL TWICE DAILY
Qty: 60 | Refills: 3 | Status: DISCONTINUED | COMMUNITY
Start: 2018-02-26 | End: 2020-04-28

## 2020-04-28 RX ORDER — HYDROXYCHLOROQUINE SULFATE 200 MG/1
200 TABLET, FILM COATED ORAL TWICE DAILY
Qty: 60 | Refills: 3 | Status: DISCONTINUED | COMMUNITY
Start: 2018-02-08 | End: 2020-04-28

## 2020-06-04 ENCOUNTER — APPOINTMENT (OUTPATIENT)
Dept: HEART AND VASCULAR | Facility: CLINIC | Age: 70
End: 2020-06-04
Payer: MEDICARE

## 2020-06-04 ENCOUNTER — NON-APPOINTMENT (OUTPATIENT)
Age: 70
End: 2020-06-04

## 2020-06-04 VITALS
DIASTOLIC BLOOD PRESSURE: 84 MMHG | HEART RATE: 58 BPM | BODY MASS INDEX: 33.13 KG/M2 | HEIGHT: 63 IN | WEIGHT: 187 LBS | TEMPERATURE: 99.6 F | OXYGEN SATURATION: 97 % | SYSTOLIC BLOOD PRESSURE: 164 MMHG

## 2020-06-04 VITALS — SYSTOLIC BLOOD PRESSURE: 130 MMHG | DIASTOLIC BLOOD PRESSURE: 90 MMHG

## 2020-06-04 PROCEDURE — 99213 OFFICE O/P EST LOW 20 MIN: CPT

## 2020-06-04 RX ORDER — INSULIN GLARGINE 100 [IU]/ML
INJECTION, SOLUTION SUBCUTANEOUS
Refills: 0 | Status: DISCONTINUED | COMMUNITY
End: 2020-06-04

## 2020-06-04 NOTE — PHYSICAL EXAM
[General Appearance - In No Acute Distress] : no acute distress [Auscultation Breath Sounds / Voice Sounds] : lungs were clear to auscultation bilaterally [Heart Sounds] : normal S1 and S2 [Edema] : no peripheral edema present

## 2020-06-04 NOTE — HISTORY OF PRESENT ILLNESS
[FreeTextEntry1] : Feels generally well.\par Staying home because of the pandemic. Misses her daughter and granddaughter, but otherwise coping well. No SOB, CP or edema.\par Patient reports taking her listed medications as directed.\par

## 2020-06-05 LAB
ALBUMIN SERPL ELPH-MCNC: 4.8 G/DL
ALP BLD-CCNC: 86 U/L
ALT SERPL-CCNC: 11 U/L
ANION GAP SERPL CALC-SCNC: 17 MMOL/L
AST SERPL-CCNC: 17 U/L
BILIRUB SERPL-MCNC: 0.2 MG/DL
BUN SERPL-MCNC: 33 MG/DL
CALCIUM SERPL-MCNC: 10.3 MG/DL
CHLORIDE SERPL-SCNC: 96 MMOL/L
CO2 SERPL-SCNC: 26 MMOL/L
CREAT SERPL-MCNC: 1.63 MG/DL
GLUCOSE SERPL-MCNC: 152 MG/DL
POTASSIUM SERPL-SCNC: 3.5 MMOL/L
PROT SERPL-MCNC: 7.3 G/DL
SARS-COV-2 IGG SERPL IA-ACNC: 210 AU/ML
SARS-COV-2 IGG SERPL QL IA: POSITIVE
SODIUM SERPL-SCNC: 139 MMOL/L

## 2020-06-22 ENCOUNTER — APPOINTMENT (OUTPATIENT)
Dept: RHEUMATOLOGY | Facility: CLINIC | Age: 70
End: 2020-06-22
Payer: MEDICARE

## 2020-06-22 VITALS
DIASTOLIC BLOOD PRESSURE: 79 MMHG | BODY MASS INDEX: 34.29 KG/M2 | WEIGHT: 193.5 LBS | HEART RATE: 55 BPM | SYSTOLIC BLOOD PRESSURE: 146 MMHG | TEMPERATURE: 98.4 F | HEIGHT: 63 IN | OXYGEN SATURATION: 95 %

## 2020-06-22 PROCEDURE — 36415 COLL VENOUS BLD VENIPUNCTURE: CPT

## 2020-06-22 PROCEDURE — 99214 OFFICE O/P EST MOD 30 MIN: CPT | Mod: 25

## 2020-06-22 NOTE — HISTORY OF PRESENT ILLNESS
[___ Month(s) Ago] : [unfilled] month(s) ago [FreeTextEntry1] : Office Visit 3/3/20:\par Doing well \par Following with nephrology \par No joint pain or swelling, no stiffness \par Going to PT, doing the exercises \par Plan: Condition stable on triple therapy - continue\par Has been going to PT which is helping\par Check labs. \par \par Office Visit 12/4/19:\par Last set of labs with elevated ESR/CRP and rising creatinine \par She has an appt with nephro (Dr. Robledo at [facility]) next week \par Denies joint pain, swelling or stiffness \par Shoulder pain has improved with PT\par Plan: Doing well - re check ESR/CRP today. Unlikely associated with arthritis given no clinically significant findings on history or exam to suggest a flare. \par Patient to follow up with nephrology next week. \par Continue triple therapy.\par \par Office Visit 8/19/19:\par Feeling well, shoulder improved\par Going to PT twice a week \par No joint pain \par Plan: Doing well on triple therapy. \par Continue current medication regimen. \par Continue PT \par \par Office Visit 7/15/19:\par No flares\par Doing well \par Plan: Doing well. Continue triple therapy. \par Check labs\par PT referral\par \par Office Visit 4/18/19:\par Joints feel good. Denies pain or swelling. \par Plan: Doing well \par Continue triple therapy. \par Check labs. \par \par Office Visit 1/18/19:\par Doing well, no joint pains \par No joint pain / swelling / stiffness \par Plan: Doing well at this time. \par No flares. \par Losing weight. Continue to encourage exercise. \par Continue current regimen of triple therapy. \par \par Office Visit 10/181/8:\par Feels well overall \par No symptoms today - denies swelling/stiffness in joints\par Plan: Doing well. \par Continue triple therapy. \par Check labs. \par Continue vitamin d and dietary calcium. \par \par Office Visit 7/19/18:\par Notes pain in her R shoulder, rarely in L also. Points to subacromial bursae when asked for site of pain. Not worse with any movements. She takes Hydrocodone for back pain and states this relieves her shoulder pain. At night time she will take Tylonel usually. \par States she is active, walks about twenty blocks a day. \par Overall feeling better and without complaints. \par Feels at her normal baseline functional status at this time. \par Plan: Doing well \par Continue current regimen\par Check labs for monitoring toxicity on current regimen. \par \par Office Visit 4/17/18:\par Patient states she feels well and denies any joint complaints today \par States her prior shoulder pain feels much better\par Currently has a cold\par Mild leukocytosis and stable anemia on lab work\par Markers of disease activity trending down \par No swelling or morning stiffness in any joints\par Plan: Continue triple therapy for now given remarkable improvement both clinically and serologically. \par Check labs for monitoring of disease activity and toxicities from MTX and SSZ. \par \par Office Visit 2/26/18:\par Patient with elevated ESR/CRP on last set of labs, mild translation of C spine on XR and nodulosis. No active arthritis. Discussed that we should escalate therapy in this setting however she was resistant to doing so at this time. She refuses to take injectable therapies. We discussed the possibility of adding Sulfasalzine for triple therapy and she stated she would think about it. She presents today and agrees to try this. \par Denies any joint pains / swelling / stiffness today. \par No new nodules. \par Plan: Given suboptimally controlled disease with elevated acute phase reactants, C spine translation and nodulosis will add Sulfasalazine 500mg BID for now. Patient to return in two weeks to re check CBC. \par \par Office Visit 2/8/17:\par Patient states she feels well. \par Notes ongoing pain in her trapezius muscles but not interested in trying a C Spine pillow as I think this has to do with the way she sleeps. \par Not interested in PT\par SC nodule on her elbow is resolving. \par Denies swelling or morning stiffness in any joints. \par Plan: She is currently happy with her medication regimen. Her arthritis is not active and thus will continue her current medication regimen of MTX 20mg and folic acid 2mg daily along with Plaquenil 200mg daily. \par Check CBC, CMP, ESR and CRP \par She never went to have the US of her SC nodule on her arm but is not interested in doing so. \par Given her kyphosis and neck pain, I have encourage patient to try PT and a C spine pillow to correct the way she sleeps. She is not interested in trying the pillow but will consider PT pending what is available. \par Management of back pain per spine specialist. \par \par Office Visit 11/9/17:\par High titre RF on last set of labs.\par Denies swelling or morning stiffness in any joints today. \par Overall has felt great since her epidural injections (by pain specialist) \par Has a nodular bony lesion overlying L forearm which does not bother her currently. States she has had this for many years and it is more painful during disease flares. \par Plan: \par -Patient currently on MTX 20mg and folic acid 2mg daily along with Plaquenil 200mg daily and has done well on this regimen. She has no flares, and eventually I think we can consider tapering down her MTX. \par -She has ongoing back pain but states that her pain is under control with her Fentanyl patch and hydrocodone. \par -Optho screening UTD for plaquenil toxicity \par -US nodular lesion on forearm  \par Office visit 9/7/17:\par Patient diagnosed with rheumatoid arthritis (unclear serologies) twenty years ago though she only started therapy in 2009 and has been on a stable dose of MTX and Plaquenil since. She states that she initially had swelling and morning stiffness in her MCPs and wrists. Currently she denies any flares - no swelling or morning stiffness in any of her joints. She states she has essentially been well controlled since 2014, and this is consistent with her notes from Dr. Mueller. Though I have no recent lab results or serologies. \par Patient has a herniated disc in her back which causes pain, has difficulty standing for prolonged periods and walking more than 5 blocks. She sees a pain specialist and has a Fentanyl patch as well as Hydrocodone tablets, for which she takes up to 5 a day. \par Recent evaluation for lower extremity swelling, has a history of vein ablation and sclerotherapy in bilateral lower extremities. She had been started on Lasix which has partially resolved the swelling and she had a US last week which ruled out a DVT. \par Patient fully independent with ADLs and iADLS.

## 2020-06-22 NOTE — ASSESSMENT
[FreeTextEntry1] : 69 year old female presents for evaluation of rheumatoid arthritis \par Given clinical stability and rising creatinine reduce MTX to 10mg weekly and SSZ to 500mg daily. \par Continue Plaquenil and f/u with ophtho next week\par Check labs.

## 2020-06-22 NOTE — REVIEW OF SYSTEMS
[As Noted in HPI] : as noted in HPI [Fever] : no fever [Chills] : no chills [Eye Pain] : no eye pain [Red Eyes] : eyes not red [Dry Eyes] : no dryness of the eyes [Nasal Discharge] : no nasal discharge [Chest Pain] : no chest pain [Sore Throat] : no sore throat [Palpitations] : no palpitations [Leg Claudication] : no intermittent leg claudication [Lower Ext Edema] : no lower extremity edema [Shortness Of Breath] : no shortness of breath [SOB on Exertion] : no shortness of breath during exertion [Wheezing] : no wheezing [Cough] : no cough [Abdominal Pain] : no abdominal pain [Diarrhea] : no diarrhea [Vomiting] : no vomiting [Constipation] : no constipation [Dysuria] : no dysuria [Incontinence] : no incontinence [Dizziness] : no dizziness [Skin Lesions] : no skin lesions [Fainting] : no fainting [Anxiety] : no anxiety [Depression] : no depression

## 2020-06-22 NOTE — PHYSICAL EXAM
[General Appearance - In No Acute Distress] : in no acute distress [General Appearance - Alert] : alert [Hearing Threshold Finger Rub Not [place]] : hearing was normal [Outer Ear] : the ears and nose were normal in appearance [Sclera] : the sclera and conjunctiva were normal [Examination Of The Oral Cavity] : the lips and gums were normal [Neck Cervical Mass (___cm)] : no neck mass was observed [Respiration, Rhythm And Depth] : normal respiratory rhythm and effort [Auscultation Breath Sounds / Voice Sounds] : lungs were clear to auscultation bilaterally [Heart Sounds] : normal S1 and S2 [Heart Rate And Rhythm] : heart rate was normal and rhythm regular [Abdomen Soft] : soft [Bowel Sounds] : normal bowel sounds [Abdomen Tenderness] : non-tender [Cervical Lymph Nodes Enlarged Anterior Bilaterally] : anterior cervical [Cervical Lymph Nodes Enlarged Posterior Bilaterally] : posterior cervical [Motor Tone] : muscle strength and tone were normal [No Spinal Tenderness] : no spinal tenderness [Musculoskeletal - Swelling] : no joint swelling seen [] : no rash [Sensation] : the sensory exam was normal to light touch and pinprick [Motor Exam] : the motor exam was normal [Affect] : the affect was normal [Oriented To Time, Place, And Person] : oriented to person, place, and time [Mood] : the mood was normal [FreeTextEntry1] : Non pitting edema of bilateral lower extremities. No skin changes.

## 2020-06-24 LAB
ALBUMIN SERPL ELPH-MCNC: 4.4 G/DL
ALP BLD-CCNC: 60 U/L
ALT SERPL-CCNC: 9 U/L
ANION GAP SERPL CALC-SCNC: 15 MMOL/L
AST SERPL-CCNC: 15 U/L
BASOPHILS # BLD AUTO: 0.06 K/UL
BASOPHILS NFR BLD AUTO: 0.7 %
BILIRUB SERPL-MCNC: 0.3 MG/DL
BUN SERPL-MCNC: 32 MG/DL
CALCIUM SERPL-MCNC: 10 MG/DL
CHLORIDE SERPL-SCNC: 101 MMOL/L
CO2 SERPL-SCNC: 25 MMOL/L
CREAT SERPL-MCNC: 1.66 MG/DL
CRP SERPL-MCNC: 1.28 MG/DL
EOSINOPHIL # BLD AUTO: 0.19 K/UL
EOSINOPHIL NFR BLD AUTO: 2.1 %
ERYTHROCYTE [SEDIMENTATION RATE] IN BLOOD BY WESTERGREN METHOD: 52 MM/HR
GLUCOSE SERPL-MCNC: 179 MG/DL
HCT VFR BLD CALC: 31.5 %
HGB BLD-MCNC: 9.5 G/DL
IMM GRANULOCYTES NFR BLD AUTO: 1.2 %
LYMPHOCYTES # BLD AUTO: 1.81 K/UL
LYMPHOCYTES NFR BLD AUTO: 19.9 %
MAN DIFF?: NORMAL
MCHC RBC-ENTMCNC: 29.1 PG
MCHC RBC-ENTMCNC: 30.2 GM/DL
MCV RBC AUTO: 96.6 FL
MONOCYTES # BLD AUTO: 0.72 K/UL
MONOCYTES NFR BLD AUTO: 7.9 %
NEUTROPHILS # BLD AUTO: 6.22 K/UL
NEUTROPHILS NFR BLD AUTO: 68.2 %
PLATELET # BLD AUTO: 224 K/UL
POTASSIUM SERPL-SCNC: 4.1 MMOL/L
PROT SERPL-MCNC: 7 G/DL
RBC # BLD: 3.26 M/UL
RBC # FLD: 14.6 %
SODIUM SERPL-SCNC: 140 MMOL/L
WBC # FLD AUTO: 9.11 K/UL

## 2020-09-03 ENCOUNTER — APPOINTMENT (OUTPATIENT)
Dept: HEART AND VASCULAR | Facility: CLINIC | Age: 70
End: 2020-09-03
Payer: MEDICARE

## 2020-09-03 VITALS
OXYGEN SATURATION: 96 % | WEIGHT: 195 LBS | BODY MASS INDEX: 34.54 KG/M2 | SYSTOLIC BLOOD PRESSURE: 130 MMHG | HEART RATE: 63 BPM | TEMPERATURE: 98.6 F | DIASTOLIC BLOOD PRESSURE: 68 MMHG

## 2020-09-03 PROCEDURE — 99213 OFFICE O/P EST LOW 20 MIN: CPT

## 2020-09-03 NOTE — HISTORY OF PRESENT ILLNESS
[FreeTextEntry1] : Feels generally well. Walking about 10 blocks/day.\par No SOB, edema, chest pain.\par Recording BP at home with overall improvement in control on same meds.\par Joints "a little achy" but not bad.\par Reviewed meds in detail

## 2020-09-03 NOTE — PHYSICAL EXAM
[General Appearance - In No Acute Distress] : no acute distress [] : no respiratory distress [Auscultation Breath Sounds / Voice Sounds] : lungs were clear to auscultation bilaterally [Heart Sounds] : normal S1 and S2 [Heart Rate And Rhythm] : heart rate and rhythm were normal [Edema] : no peripheral edema present

## 2020-09-22 ENCOUNTER — APPOINTMENT (OUTPATIENT)
Dept: RHEUMATOLOGY | Facility: CLINIC | Age: 70
End: 2020-09-22
Payer: MEDICARE

## 2020-09-22 VITALS
TEMPERATURE: 98.8 F | DIASTOLIC BLOOD PRESSURE: 69 MMHG | BODY MASS INDEX: 33.84 KG/M2 | SYSTOLIC BLOOD PRESSURE: 162 MMHG | OXYGEN SATURATION: 96 % | WEIGHT: 191 LBS | HEART RATE: 64 BPM | HEIGHT: 63 IN

## 2020-09-22 PROCEDURE — 99214 OFFICE O/P EST MOD 30 MIN: CPT | Mod: 25

## 2020-09-22 PROCEDURE — 36415 COLL VENOUS BLD VENIPUNCTURE: CPT

## 2020-09-23 LAB
ALBUMIN SERPL ELPH-MCNC: 5 G/DL
ALP BLD-CCNC: 76 U/L
ALT SERPL-CCNC: 9 U/L
ANION GAP SERPL CALC-SCNC: 18 MMOL/L
AST SERPL-CCNC: 20 U/L
BASOPHILS # BLD AUTO: 0.05 K/UL
BASOPHILS NFR BLD AUTO: 0.6 %
BILIRUB SERPL-MCNC: 0.2 MG/DL
BUN SERPL-MCNC: 30 MG/DL
CALCIUM SERPL-MCNC: 10.2 MG/DL
CHLORIDE SERPL-SCNC: 100 MMOL/L
CO2 SERPL-SCNC: 26 MMOL/L
CREAT SERPL-MCNC: 1.56 MG/DL
CRP SERPL-MCNC: 0.63 MG/DL
EOSINOPHIL # BLD AUTO: 0.13 K/UL
EOSINOPHIL NFR BLD AUTO: 1.7 %
GLUCOSE SERPL-MCNC: 145 MG/DL
HCT VFR BLD CALC: 35.5 %
HGB BLD-MCNC: 10.6 G/DL
IMM GRANULOCYTES NFR BLD AUTO: 0.4 %
LYMPHOCYTES # BLD AUTO: 1.48 K/UL
LYMPHOCYTES NFR BLD AUTO: 19.2 %
MAN DIFF?: NORMAL
MCHC RBC-ENTMCNC: 27.1 PG
MCHC RBC-ENTMCNC: 29.9 GM/DL
MCV RBC AUTO: 90.8 FL
MONOCYTES # BLD AUTO: 0.71 K/UL
MONOCYTES NFR BLD AUTO: 9.2 %
NEUTROPHILS # BLD AUTO: 5.32 K/UL
NEUTROPHILS NFR BLD AUTO: 68.9 %
PLATELET # BLD AUTO: 247 K/UL
POTASSIUM SERPL-SCNC: 3.6 MMOL/L
PROT SERPL-MCNC: 7.7 G/DL
RBC # BLD: 3.91 M/UL
RBC # FLD: 14.7 %
SODIUM SERPL-SCNC: 144 MMOL/L
WBC # FLD AUTO: 7.72 K/UL

## 2020-09-25 LAB — ERYTHROCYTE [SEDIMENTATION RATE] IN BLOOD BY WESTERGREN METHOD: 40 MM/HR

## 2020-09-26 NOTE — PHYSICAL EXAM
[General Appearance - Alert] : alert [General Appearance - In No Acute Distress] : in no acute distress [Sclera] : the sclera and conjunctiva were normal [Outer Ear] : the ears and nose were normal in appearance [Hearing Threshold Finger Rub Not [place]] : hearing was normal [Examination Of The Oral Cavity] : the lips and gums were normal [Neck Cervical Mass (___cm)] : no neck mass was observed [Respiration, Rhythm And Depth] : normal respiratory rhythm and effort [Auscultation Breath Sounds / Voice Sounds] : lungs were clear to auscultation bilaterally [Heart Rate And Rhythm] : heart rate was normal and rhythm regular [Heart Sounds] : normal S1 and S2 [Bowel Sounds] : normal bowel sounds [Abdomen Soft] : soft [Abdomen Tenderness] : non-tender [Cervical Lymph Nodes Enlarged Posterior Bilaterally] : posterior cervical [Cervical Lymph Nodes Enlarged Anterior Bilaterally] : anterior cervical [No Spinal Tenderness] : no spinal tenderness [Musculoskeletal - Swelling] : no joint swelling seen [Motor Tone] : muscle strength and tone were normal [] : no rash [Sensation] : the sensory exam was normal to light touch and pinprick [Motor Exam] : the motor exam was normal [Oriented To Time, Place, And Person] : oriented to person, place, and time [Affect] : the affect was normal [Mood] : the mood was normal [FreeTextEntry1] : Slow gait. No synovitis.

## 2020-09-26 NOTE — ASSESSMENT
[FreeTextEntry1] : 70 year old female presents for follow up evaluation of rheumatoid arthritis \par Attempt to taper off triple therapy, however she notes worsening joint pain. No synovitis on exam today, and thus potentially not inflammatory in etiology however she would like to increase some of the medications as she clearly notes a symptomatic change. \par Given worsening renal function will keep MTX to 10mg weekly and increase SSZ back to 1g daily. Continue Plaquenil. \par Check labs \par PRN Voltaren gel for knee pain.

## 2020-09-26 NOTE — HISTORY OF PRESENT ILLNESS
[___ Month(s) Ago] : [unfilled] month(s) ago [FreeTextEntry1] : Office Visit 6/22/20:\par COVID IgG positive\par No joint pain \par Denies issues with medication\par Plan: Given clinical stability and rising creatinine reduce MTX to 10mg weekly and SSZ to 500mg daily. \par Continue Plaquenil and f/u with ophtho next week\par Check labs. \par \par Office Visit 3/3/20:\par Doing well \par Following with nephrology \par No joint pain or swelling, no stiffness \par Going to PT, doing the exercises \par Plan: Condition stable on triple therapy - continue\par Has been going to PT which is helping\par Check labs. \par \par Office Visit 12/4/19:\par Last set of labs with elevated ESR/CRP and rising creatinine \par She has an appt with nephro (Dr. Robledo at [facility]) next week \par Denies joint pain, swelling or stiffness \par Shoulder pain has improved with PT\par Plan: Doing well - re check ESR/CRP today. Unlikely associated with arthritis given no clinically significant findings on history or exam to suggest a flare. \par Patient to follow up with nephrology next week. \par Continue triple therapy.\par \par Office Visit 8/19/19:\par Feeling well, shoulder improved\par Going to PT twice a week \par No joint pain \par Plan: Doing well on triple therapy. \par Continue current medication regimen. \par Continue PT \par \par Office Visit 7/15/19:\par No flares\par Doing well \par Plan: Doing well. Continue triple therapy. \par Check labs\par PT referral\par \par Office Visit 4/18/19:\par Joints feel good. Denies pain or swelling. \par Plan: Doing well \par Continue triple therapy. \par Check labs. \par \par Office Visit 1/18/19:\par Doing well, no joint pains \par No joint pain / swelling / stiffness \par Plan: Doing well at this time. \par No flares. \par Losing weight. Continue to encourage exercise. \par Continue current regimen of triple therapy. \par \par Office Visit 10/181/8:\par Feels well overall \par No symptoms today - denies swelling/stiffness in joints\par Plan: Doing well. \par Continue triple therapy. \par Check labs. \par Continue vitamin d and dietary calcium. \par \par Office Visit 7/19/18:\par Notes pain in her R shoulder, rarely in L also. Points to subacromial bursae when asked for site of pain. Not worse with any movements. She takes Hydrocodone for back pain and states this relieves her shoulder pain. At night time she will take Tylonel usually. \par States she is active, walks about twenty blocks a day. \par Overall feeling better and without complaints. \par Feels at her normal baseline functional status at this time. \par Plan: Doing well \par Continue current regimen\par Check labs for monitoring toxicity on current regimen. \par \par Office Visit 4/17/18:\par Patient states she feels well and denies any joint complaints today \par States her prior shoulder pain feels much better\par Currently has a cold\par Mild leukocytosis and stable anemia on lab work\par Markers of disease activity trending down \par No swelling or morning stiffness in any joints\par Plan: Continue triple therapy for now given remarkable improvement both clinically and serologically. \par Check labs for monitoring of disease activity and toxicities from MTX and SSZ. \par \par Office Visit 2/26/18:\par Patient with elevated ESR/CRP on last set of labs, mild translation of C spine on XR and nodulosis. No active arthritis. Discussed that we should escalate therapy in this setting however she was resistant to doing so at this time. She refuses to take injectable therapies. We discussed the possibility of adding Sulfasalzine for triple therapy and she stated she would think about it. She presents today and agrees to try this. \par Denies any joint pains / swelling / stiffness today. \par No new nodules. \par Plan: Given suboptimally controlled disease with elevated acute phase reactants, C spine translation and nodulosis will add Sulfasalazine 500mg BID for now. Patient to return in two weeks to re check CBC. \par \par Office Visit 2/8/17:\par Patient states she feels well. \par Notes ongoing pain in her trapezius muscles but not interested in trying a C Spine pillow as I think this has to do with the way she sleeps. \par Not interested in PT\par SC nodule on her elbow is resolving. \par Denies swelling or morning stiffness in any joints. \par Plan: She is currently happy with her medication regimen. Her arthritis is not active and thus will continue her current medication regimen of MTX 20mg and folic acid 2mg daily along with Plaquenil 200mg daily. \par Check CBC, CMP, ESR and CRP \par She never went to have the US of her SC nodule on her arm but is not interested in doing so. \par Given her kyphosis and neck pain, I have encourage patient to try PT and a C spine pillow to correct the way she sleeps. She is not interested in trying the pillow but will consider PT pending what is available. \par Management of back pain per spine specialist. \par \par Office Visit 11/9/17:\par High titre RF on last set of labs.\par Denies swelling or morning stiffness in any joints today. \par Overall has felt great since her epidural injections (by pain specialist) \par Has a nodular bony lesion overlying L forearm which does not bother her currently. States she has had this for many years and it is more painful during disease flares. \par Plan: \par -Patient currently on MTX 20mg and folic acid 2mg daily along with Plaquenil 200mg daily and has done well on this regimen. She has no flares, and eventually I think we can consider tapering down her MTX. \par -She has ongoing back pain but states that her pain is under control with her Fentanyl patch and hydrocodone. \par -Optho screening UTD for plaquenil toxicity \par -US nodular lesion on forearm  \par Office visit 9/7/17:\par Patient diagnosed with rheumatoid arthritis (unclear serologies) twenty years ago though she only started therapy in 2009 and has been on a stable dose of MTX and Plaquenil since. She states that she initially had swelling and morning stiffness in her MCPs and wrists. Currently she denies any flares - no swelling or morning stiffness in any of her joints. She states she has essentially been well controlled since 2014, and this is consistent with her notes from Dr. Mueller. Though I have no recent lab results or serologies. \par Patient has a herniated disc in her back which causes pain, has difficulty standing for prolonged periods and walking more than 5 blocks. She sees a pain specialist and has a Fentanyl patch as well as Hydrocodone tablets, for which she takes up to 5 a day. \par Recent evaluation for lower extremity swelling, has a history of vein ablation and sclerotherapy in bilateral lower extremities. She had been started on Lasix which has partially resolved the swelling and she had a US last week which ruled out a DVT. \par Patient fully independent with ADLs and iADLS.

## 2020-10-27 ENCOUNTER — NON-APPOINTMENT (OUTPATIENT)
Age: 70
End: 2020-10-27

## 2020-11-17 ENCOUNTER — APPOINTMENT (OUTPATIENT)
Dept: INTERNAL MEDICINE | Facility: CLINIC | Age: 70
End: 2020-11-17
Payer: MEDICARE

## 2020-11-17 VITALS
TEMPERATURE: 98.2 F | BODY MASS INDEX: 34.38 KG/M2 | DIASTOLIC BLOOD PRESSURE: 75 MMHG | HEART RATE: 52 BPM | HEIGHT: 63 IN | WEIGHT: 194 LBS | SYSTOLIC BLOOD PRESSURE: 130 MMHG | OXYGEN SATURATION: 97 %

## 2020-11-17 DIAGNOSIS — Z00.00 ENCOUNTER FOR GENERAL ADULT MEDICAL EXAMINATION W/OUT ABNORMAL FINDINGS: ICD-10-CM

## 2020-11-17 PROCEDURE — G0439: CPT

## 2020-11-17 PROCEDURE — 99072 ADDL SUPL MATRL&STAF TM PHE: CPT

## 2020-11-17 PROCEDURE — 36415 COLL VENOUS BLD VENIPUNCTURE: CPT

## 2020-11-17 NOTE — HEALTH RISK ASSESSMENT
[No] : No [No falls in past year] : Patient reported no falls in the past year [0] : 2) Feeling down, depressed, or hopeless: Not at all (0) [Patient reported mammogram was normal] : Patient reported mammogram was normal [Patient reported colonoscopy was normal] : Patient reported colonoscopy was normal [HIV test declined] : HIV test declined [] : No [[id number]] : 0 [MammogramDate] : 01/2019 [PapSmearComments] : not recenly [BoneDensityComments] :  >justin [ColonoscopyDate] : 01/2014

## 2020-11-17 NOTE — ASSESSMENT
[FreeTextEntry1] : Appears to have a stable examination; \par Have ordered all labs including Covid antibody;\par Follow up with various consultants;\par Encouraged patient to diet and try to walk

## 2020-11-17 NOTE — PHYSICAL EXAM
[No Acute Distress] : no acute distress [Well Nourished] : well nourished [Well Developed] : well developed [Well-Appearing] : well-appearing [Normal Sclera/Conjunctiva] : normal sclera/conjunctiva [PERRL] : pupils equal round and reactive to light [EOMI] : extraocular movements intact [Normal Outer Ear/Nose] : the outer ears and nose were normal in appearance [Normal Oropharynx] : the oropharynx was normal [No JVD] : no jugular venous distention [No Lymphadenopathy] : no lymphadenopathy [Supple] : supple [Thyroid Normal, No Nodules] : the thyroid was normal and there were no nodules present [No Respiratory Distress] : no respiratory distress  [No Accessory Muscle Use] : no accessory muscle use [Clear to Auscultation] : lungs were clear to auscultation bilaterally [Normal Rate] : normal rate  [Regular Rhythm] : with a regular rhythm [Normal S1, S2] : normal S1 and S2 [No Murmur] : no murmur heard [No Carotid Bruits] : no carotid bruits [No Abdominal Bruit] : a ~M bruit was not heard ~T in the abdomen [No Varicosities] : no varicosities [Pedal Pulses Present] : the pedal pulses are present [No Edema] : there was no peripheral edema [No Palpable Aorta] : no palpable aorta [No Extremity Clubbing/Cyanosis] : no extremity clubbing/cyanosis [Soft] : abdomen soft [Non Tender] : non-tender [Non-distended] : non-distended [No Masses] : no abdominal mass palpated [No HSM] : no HSM [Normal Bowel Sounds] : normal bowel sounds [Normal Posterior Cervical Nodes] : no posterior cervical lymphadenopathy [Normal Anterior Cervical Nodes] : no anterior cervical lymphadenopathy [No CVA Tenderness] : no CVA  tenderness [No Spinal Tenderness] : no spinal tenderness [No Joint Swelling] : no joint swelling [Grossly Normal Strength/Tone] : grossly normal strength/tone [No Rash] : no rash [Coordination Grossly Intact] : coordination grossly intact [No Focal Deficits] : no focal deficits [Normal Gait] : normal gait [Normal Affect] : the affect was normal [Normal Insight/Judgement] : insight and judgment were intact [de-identified] : obese

## 2020-11-17 NOTE — HISTORY OF PRESENT ILLNESS
[FreeTextEntry1] : Interim reviewed; Medical problems reviewed [de-identified] : As above and in addition is followed by heme for question of Protein in blood; Also followed by Tatiana Stack RA; \par HAs diabetes \par Was an RN

## 2020-11-25 LAB
25(OH)D3 SERPL-MCNC: 38.9 NG/ML
ALBUMIN SERPL ELPH-MCNC: 4.5 G/DL
ALP BLD-CCNC: 67 U/L
ALT SERPL-CCNC: 8 U/L
ANION GAP SERPL CALC-SCNC: 17 MMOL/L
APPEARANCE: ABNORMAL
AST SERPL-CCNC: 17 U/L
BACTERIA: ABNORMAL
BASOPHILS # BLD AUTO: 0.05 K/UL
BASOPHILS NFR BLD AUTO: 0.7 %
BILIRUB SERPL-MCNC: 0.2 MG/DL
BILIRUBIN URINE: NEGATIVE
BLOOD URINE: NEGATIVE
BUN SERPL-MCNC: 25 MG/DL
CALCIUM SERPL-MCNC: 10 MG/DL
CHLORIDE SERPL-SCNC: 101 MMOL/L
CHOLEST SERPL-MCNC: 204 MG/DL
CO2 SERPL-SCNC: 24 MMOL/L
COLOR: YELLOW
CREAT SERPL-MCNC: 1.75 MG/DL
EOSINOPHIL # BLD AUTO: 0.21 K/UL
EOSINOPHIL NFR BLD AUTO: 3 %
ESTIMATED AVERAGE GLUCOSE: 169 MG/DL
GLUCOSE QUALITATIVE U: ABNORMAL
GLUCOSE SERPL-MCNC: 87 MG/DL
HBA1C MFR BLD HPLC: 7.5 %
HCT VFR BLD CALC: 34.6 %
HDLC SERPL-MCNC: 49 MG/DL
HGB BLD-MCNC: 10.2 G/DL
HYALINE CASTS: 0 /LPF
IMM GRANULOCYTES NFR BLD AUTO: 0.3 %
KETONES URINE: NORMAL
LDLC SERPL CALC-MCNC: 133 MG/DL
LEUKOCYTE ESTERASE URINE: ABNORMAL
LYMPHOCYTES # BLD AUTO: 1.63 K/UL
LYMPHOCYTES NFR BLD AUTO: 23.1 %
MAN DIFF?: NORMAL
MCHC RBC-ENTMCNC: 27 PG
MCHC RBC-ENTMCNC: 29.5 GM/DL
MCV RBC AUTO: 91.5 FL
MICROSCOPIC-UA: NORMAL
MONOCYTES # BLD AUTO: 0.66 K/UL
MONOCYTES NFR BLD AUTO: 9.4 %
NEUTROPHILS # BLD AUTO: 4.48 K/UL
NEUTROPHILS NFR BLD AUTO: 63.5 %
NITRITE URINE: NEGATIVE
NONHDLC SERPL-MCNC: 154 MG/DL
PH URINE: 6.5
PLATELET # BLD AUTO: 260 K/UL
POTASSIUM SERPL-SCNC: 3.7 MMOL/L
PROT SERPL-MCNC: 7.3 G/DL
PROTEIN URINE: NORMAL
RBC # BLD: 3.78 M/UL
RBC # FLD: 16.3 %
RED BLOOD CELLS URINE: 2 /HPF
SARS-COV-2 IGG SERPL IA-ACNC: 0.08 INDEX
SARS-COV-2 IGG SERPL QL IA: NEGATIVE
SODIUM SERPL-SCNC: 142 MMOL/L
SPECIFIC GRAVITY URINE: 1.01
SQUAMOUS EPITHELIAL CELLS: 3 /HPF
T4 FREE SERPL-MCNC: 1.3 NG/DL
TRIGL SERPL-MCNC: 107 MG/DL
TSH SERPL-ACNC: 1.47 UIU/ML
UROBILINOGEN URINE: NORMAL
WBC # FLD AUTO: 7.05 K/UL
WHITE BLOOD CELLS URINE: 28 /HPF

## 2020-12-03 ENCOUNTER — APPOINTMENT (OUTPATIENT)
Dept: HEART AND VASCULAR | Facility: CLINIC | Age: 70
End: 2020-12-03
Payer: MEDICARE

## 2020-12-03 VITALS
TEMPERATURE: 98.7 F | OXYGEN SATURATION: 97 % | WEIGHT: 200 LBS | BODY MASS INDEX: 35.44 KG/M2 | DIASTOLIC BLOOD PRESSURE: 78 MMHG | SYSTOLIC BLOOD PRESSURE: 140 MMHG | HEART RATE: 63 BPM | HEIGHT: 63 IN

## 2020-12-03 PROCEDURE — 99072 ADDL SUPL MATRL&STAF TM PHE: CPT

## 2020-12-03 PROCEDURE — 99213 OFFICE O/P EST LOW 20 MIN: CPT

## 2020-12-03 NOTE — REASON FOR VISIT
[FreeTextEntry1] : Has been under stress from COVID, but otherwise OK. SOme arthritic pain L knee. No SOB, CP, palpitations or lightheadedness.\par Started home exercise routine.\par Recent lipid panel found elevated LDL, but patient reports she was not taking her atorvastatin at the time, and is doing so now.\par Weight increased from last appointment\par Patient reports taking her listed medications as directed.\par

## 2020-12-03 NOTE — PHYSICAL EXAM
[General Appearance - In No Acute Distress] : no acute distress [Auscultation Breath Sounds / Voice Sounds] : lungs were clear to auscultation bilaterally [Heart Sounds] : normal S1 and S2 [FreeTextEntry1] : 1+ edema bilaterally

## 2020-12-22 ENCOUNTER — APPOINTMENT (OUTPATIENT)
Dept: RHEUMATOLOGY | Facility: CLINIC | Age: 70
End: 2020-12-22
Payer: MEDICARE

## 2020-12-22 VITALS
OXYGEN SATURATION: 97 % | TEMPERATURE: 97.8 F | HEART RATE: 53 BPM | HEIGHT: 63 IN | WEIGHT: 200 LBS | SYSTOLIC BLOOD PRESSURE: 148 MMHG | BODY MASS INDEX: 35.44 KG/M2 | DIASTOLIC BLOOD PRESSURE: 75 MMHG

## 2020-12-22 PROCEDURE — 99214 OFFICE O/P EST MOD 30 MIN: CPT

## 2020-12-22 PROCEDURE — 99072 ADDL SUPL MATRL&STAF TM PHE: CPT

## 2020-12-23 NOTE — ASSESSMENT
[FreeTextEntry1] : 70 year old female presents for follow up evaluation of rheumatoid arthritis \par Doing well overall \par No flares / RA stable \par Continue to monitor renal function on triple therapy - MTX dose has already been lowered in light of this\par Check labs\par 
3.14

## 2020-12-23 NOTE — PHYSICAL EXAM
[General Appearance - Alert] : alert [General Appearance - In No Acute Distress] : in no acute distress [Sclera] : the sclera and conjunctiva were normal [Outer Ear] : the ears and nose were normal in appearance [Hearing Threshold Finger Rub Not [place]] : hearing was normal [Examination Of The Oral Cavity] : the lips and gums were normal [Neck Cervical Mass (___cm)] : no neck mass was observed [Respiration, Rhythm And Depth] : normal respiratory rhythm and effort [Auscultation Breath Sounds / Voice Sounds] : lungs were clear to auscultation bilaterally [Heart Rate And Rhythm] : heart rate was normal and rhythm regular [Heart Sounds] : normal S1 and S2 [Bowel Sounds] : normal bowel sounds [Abdomen Soft] : soft [Abdomen Tenderness] : non-tender [Cervical Lymph Nodes Enlarged Posterior Bilaterally] : posterior cervical [Cervical Lymph Nodes Enlarged Anterior Bilaterally] : anterior cervical [No Spinal Tenderness] : no spinal tenderness [Musculoskeletal - Swelling] : no joint swelling seen [Motor Tone] : muscle strength and tone were normal [] : no rash [Sensation] : the sensory exam was normal to light touch and pinprick [Motor Exam] : the motor exam was normal [Oriented To Time, Place, And Person] : oriented to person, place, and time [Affect] : the affect was normal [Mood] : the mood was normal [FreeTextEntry1] : Slow gait. No synovitis.

## 2020-12-23 NOTE — HISTORY OF PRESENT ILLNESS
[___ Month(s) Ago] : [unfilled] month(s) ago [FreeTextEntry1] : Office Visit 9/24/20:\par Worsening of creatinine - seeing Dr. Burgess at [facility]. Most recent 1.9 (patient reported, no records) \par Last visit reduced MTX to 4mg weekly and SSZ to 500mg daily. She notes diffuse body aches since in her joints > muscles. Most severe in her shoulders, left knee and left wrist. No swelling, am stiffness about 20min. Slower with doing things but denies any significant limitation of function. \par Denies lower back pain. Remains on a stable dose of morphine. \par Plan: Attempt to taper off triple therapy, however she notes worsening joint pain. No synovitis on exam today, and thus potentially not inflammatory in etiology however she would like to increase some of the medications as she clearly notes a symptomatic change. \par Given worsening renal function will keep MTX to 10mg weekly and increase SSZ back to 1g daily. Continue Plaquenil. \par Check labs \par PRN Voltaren gel for knee pain. \par \par Office Visit 6/22/20:\par COVID IgG positive\par No joint pain \par Denies issues with medication\par Plan: Given clinical stability and rising creatinine reduce MTX to 10mg weekly and SSZ to 500mg daily. \par Continue Plaquenil and f/u with ophtho next week\par Check labs. \par \par Office Visit 3/3/20:\par Doing well \par Following with nephrology \par No joint pain or swelling, no stiffness \par Going to PT, doing the exercises \par Plan: Condition stable on triple therapy - continue\par Has been going to PT which is helping\par Check labs. \par \par Office Visit 12/4/19:\par Last set of labs with elevated ESR/CRP and rising creatinine \par She has an appt with nephro (Dr. Robledo at [place]) next week \par Denies joint pain, swelling or stiffness \par Shoulder pain has improved with PT\par Plan: Doing well - re check ESR/CRP today. Unlikely associated with arthritis given no clinically significant findings on history or exam to suggest a flare. \par Patient to follow up with nephrology next week. \par Continue triple therapy.\par \par Office Visit 8/19/19:\par Feeling well, shoulder improved\par Going to PT twice a week \par No joint pain \par Plan: Doing well on triple therapy. \par Continue current medication regimen. \par Continue PT \par \par Office Visit 7/15/19:\par No flares\par Doing well \par Plan: Doing well. Continue triple therapy. \par Check labs\par PT referral\par \par Office Visit 4/18/19:\par Joints feel good. Denies pain or swelling. \par Plan: Doing well \par Continue triple therapy. \par Check labs. \par \par Office Visit 1/18/19:\par Doing well, no joint pains \par No joint pain / swelling / stiffness \par Plan: Doing well at this time. \par No flares. \par Losing weight. Continue to encourage exercise. \par Continue current regimen of triple therapy. \par \par Office Visit 10/181/8:\par Feels well overall \par No symptoms today - denies swelling/stiffness in joints\par Plan: Doing well. \par Continue triple therapy. \par Check labs. \par Continue vitamin d and dietary calcium. \par \par Office Visit 7/19/18:\par Notes pain in her R shoulder, rarely in L also. Points to subacromial bursae when asked for site of pain. Not worse with any movements. She takes Hydrocodone for back pain and states this relieves her shoulder pain. At night time she will take Tylonel usually. \par States she is active, walks about twenty blocks a day. \par Overall feeling better and without complaints. \par Feels at her normal baseline functional status at this time. \par Plan: Doing well \par Continue current regimen\par Check labs for monitoring toxicity on current regimen. \par \par Office Visit 4/17/18:\par Patient states she feels well and denies any joint complaints today \par States her prior shoulder pain feels much better\par Currently has a cold\par Mild leukocytosis and stable anemia on lab work\par Markers of disease activity trending down \par No swelling or morning stiffness in any joints\par Plan: Continue triple therapy for now given remarkable improvement both clinically and serologically. \par Check labs for monitoring of disease activity and toxicities from MTX and SSZ. \par \par Office Visit 2/26/18:\par Patient with elevated ESR/CRP on last set of labs, mild translation of C spine on XR and nodulosis. No active arthritis. Discussed that we should escalate therapy in this setting however she was resistant to doing so at this time. She refuses to take injectable therapies. We discussed the possibility of adding Sulfasalzine for triple therapy and she stated she would think about it. She presents today and agrees to try this. \par Denies any joint pains / swelling / stiffness today. \par No new nodules. \par Plan: Given suboptimally controlled disease with elevated acute phase reactants, C spine translation and nodulosis will add Sulfasalazine 500mg BID for now. Patient to return in two weeks to re check CBC. \par \par Office Visit 2/8/17:\par Patient states she feels well. \par Notes ongoing pain in her trapezius muscles but not interested in trying a C Spine pillow as I think this has to do with the way she sleeps. \par Not interested in PT\par SC nodule on her elbow is resolving. \par Denies swelling or morning stiffness in any joints. \par Plan: She is currently happy with her medication regimen. Her arthritis is not active and thus will continue her current medication regimen of MTX 20mg and folic acid 2mg daily along with Plaquenil 200mg daily. \par Check CBC, CMP, ESR and CRP \par She never went to have the US of her SC nodule on her arm but is not interested in doing so. \par Given her kyphosis and neck pain, I have encourage patient to try PT and a C spine pillow to correct the way she sleeps. She is not interested in trying the pillow but will consider PT pending what is available. \par Management of back pain per spine specialist. \par \par Office Visit 11/9/17:\par High titre RF on last set of labs.\par Denies swelling or morning stiffness in any joints today. \par Overall has felt great since her epidural injections (by pain specialist) \par Has a nodular bony lesion overlying L forearm which does not bother her currently. States she has had this for many years and it is more painful during disease flares. \par Plan: \par -Patient currently on MTX 20mg and folic acid 2mg daily along with Plaquenil 200mg daily and has done well on this regimen. She has no flares, and eventually I think we can consider tapering down her MTX. \par -She has ongoing back pain but states that her pain is under control with her Fentanyl patch and hydrocodone. \par -Optho screening UTD for plaquenil toxicity \par -US nodular lesion on forearm  \par Office visit 9/7/17:\par Patient diagnosed with rheumatoid arthritis (unclear serologies) twenty years ago though she only started therapy in 2009 and has been on a stable dose of MTX and Plaquenil since. She states that she initially had swelling and morning stiffness in her MCPs and wrists. Currently she denies any flares - no swelling or morning stiffness in any of her joints. She states she has essentially been well controlled since 2014, and this is consistent with her notes from Dr. Mueller. Though I have no recent lab results or serologies. \par Patient has a herniated disc in her back which causes pain, has difficulty standing for prolonged periods and walking more than 5 blocks. She sees a pain specialist and has a Fentanyl patch as well as Hydrocodone tablets, for which she takes up to 5 a day. \par Recent evaluation for lower extremity swelling, has a history of vein ablation and sclerotherapy in bilateral lower extremities. She had been started on Lasix which has partially resolved the swelling and she had a US last week which ruled out a DVT. \par Patient fully independent with ADLs and iADLS.

## 2020-12-28 LAB
ALBUMIN SERPL ELPH-MCNC: 4.6 G/DL
ALP BLD-CCNC: 71 U/L
ALT SERPL-CCNC: 8 U/L
ANION GAP SERPL CALC-SCNC: 17 MMOL/L
AST SERPL-CCNC: 16 U/L
BASOPHILS # BLD AUTO: 0.06 K/UL
BASOPHILS NFR BLD AUTO: 0.9 %
BILIRUB SERPL-MCNC: 0.2 MG/DL
BUN SERPL-MCNC: 31 MG/DL
CALCIUM SERPL-MCNC: 9.5 MG/DL
CHLORIDE SERPL-SCNC: 102 MMOL/L
CO2 SERPL-SCNC: 23 MMOL/L
CREAT SERPL-MCNC: 1.67 MG/DL
CRP SERPL-MCNC: 1.16 MG/DL
EOSINOPHIL # BLD AUTO: 0.13 K/UL
EOSINOPHIL NFR BLD AUTO: 1.9 %
ERYTHROCYTE [SEDIMENTATION RATE] IN BLOOD BY WESTERGREN METHOD: 48 MM/HR
GLUCOSE SERPL-MCNC: 135 MG/DL
HCT VFR BLD CALC: 34.8 %
HGB BLD-MCNC: 10.2 G/DL
IMM GRANULOCYTES NFR BLD AUTO: 0.6 %
LYMPHOCYTES # BLD AUTO: 1.37 K/UL
LYMPHOCYTES NFR BLD AUTO: 20.4 %
MAN DIFF?: NORMAL
MCHC RBC-ENTMCNC: 26.2 PG
MCHC RBC-ENTMCNC: 29.3 GM/DL
MCV RBC AUTO: 89.5 FL
MONOCYTES # BLD AUTO: 0.64 K/UL
MONOCYTES NFR BLD AUTO: 9.5 %
MTX SERPL-SCNC: <0.04 UMOL/L
NEUTROPHILS # BLD AUTO: 4.48 K/UL
NEUTROPHILS NFR BLD AUTO: 66.7 %
PLATELET # BLD AUTO: 144 K/UL
POTASSIUM SERPL-SCNC: 3.5 MMOL/L
PROT SERPL-MCNC: 7.5 G/DL
RBC # BLD: 3.89 M/UL
RBC # FLD: 15.9 %
SODIUM SERPL-SCNC: 142 MMOL/L
WBC # FLD AUTO: 6.72 K/UL

## 2020-12-29 ENCOUNTER — NON-APPOINTMENT (OUTPATIENT)
Age: 70
End: 2020-12-29

## 2021-01-11 ENCOUNTER — APPOINTMENT (OUTPATIENT)
Dept: RHEUMATOLOGY | Facility: CLINIC | Age: 71
End: 2021-01-11
Payer: MEDICARE

## 2021-01-11 PROCEDURE — 36415 COLL VENOUS BLD VENIPUNCTURE: CPT

## 2021-01-11 PROCEDURE — 99072 ADDL SUPL MATRL&STAF TM PHE: CPT

## 2021-01-13 LAB
BASOPHILS # BLD AUTO: 0.06 K/UL
BASOPHILS NFR BLD AUTO: 0.9 %
CRP SERPL-MCNC: 0.57 MG/DL
EOSINOPHIL # BLD AUTO: 0.13 K/UL
EOSINOPHIL NFR BLD AUTO: 2 %
ERYTHROCYTE [SEDIMENTATION RATE] IN BLOOD BY WESTERGREN METHOD: 54 MM/HR
HCT VFR BLD CALC: 35.5 %
HGB BLD-MCNC: 10.6 G/DL
IMM GRANULOCYTES NFR BLD AUTO: 0.6 %
LYMPHOCYTES # BLD AUTO: 1.34 K/UL
LYMPHOCYTES NFR BLD AUTO: 20.7 %
MAN DIFF?: NORMAL
MCHC RBC-ENTMCNC: 26.4 PG
MCHC RBC-ENTMCNC: 29.9 GM/DL
MCV RBC AUTO: 88.5 FL
MONOCYTES # BLD AUTO: 0.77 K/UL
MONOCYTES NFR BLD AUTO: 11.9 %
NEUTROPHILS # BLD AUTO: 4.14 K/UL
NEUTROPHILS NFR BLD AUTO: 63.9 %
PLATELET # BLD AUTO: 257 K/UL
RBC # BLD: 4.01 M/UL
RBC # FLD: 15.4 %
WBC # FLD AUTO: 6.48 K/UL

## 2021-01-19 LAB
ALBUMIN SERPL ELPH-MCNC: 4.6 G/DL
ALP BLD-CCNC: 74 U/L
ALT SERPL-CCNC: 10 U/L
ANION GAP SERPL CALC-SCNC: 18 MMOL/L
AST SERPL-CCNC: 17 U/L
BILIRUB SERPL-MCNC: <0.2 MG/DL
BUN SERPL-MCNC: 26 MG/DL
CALCIUM SERPL-MCNC: 9.8 MG/DL
CHLORIDE SERPL-SCNC: 103 MMOL/L
CO2 SERPL-SCNC: 21 MMOL/L
CREAT SERPL-MCNC: 1.8 MG/DL
GLUCOSE SERPL-MCNC: 149 MG/DL
POTASSIUM SERPL-SCNC: 3.8 MMOL/L
PROT SERPL-MCNC: 7.5 G/DL
SODIUM SERPL-SCNC: 141 MMOL/L

## 2021-02-08 ENCOUNTER — NON-APPOINTMENT (OUTPATIENT)
Age: 71
End: 2021-02-08

## 2021-02-16 ENCOUNTER — APPOINTMENT (OUTPATIENT)
Dept: INTERNAL MEDICINE | Facility: CLINIC | Age: 71
End: 2021-02-16
Payer: MEDICARE

## 2021-02-16 ENCOUNTER — APPOINTMENT (OUTPATIENT)
Dept: RHEUMATOLOGY | Facility: CLINIC | Age: 71
End: 2021-02-16
Payer: MEDICARE

## 2021-02-16 VITALS — SYSTOLIC BLOOD PRESSURE: 120 MMHG | DIASTOLIC BLOOD PRESSURE: 80 MMHG

## 2021-02-16 PROCEDURE — 99072 ADDL SUPL MATRL&STAF TM PHE: CPT

## 2021-02-16 PROCEDURE — 99213 OFFICE O/P EST LOW 20 MIN: CPT | Mod: 25

## 2021-02-16 PROCEDURE — 99213 OFFICE O/P EST LOW 20 MIN: CPT

## 2021-02-16 RX ORDER — METHOTREXATE 2.5 MG/1
2.5 TABLET ORAL
Qty: 48 | Refills: 3 | Status: DISCONTINUED | COMMUNITY
Start: 2018-02-08 | End: 2021-02-16

## 2021-02-16 RX ORDER — ACETAMINOPHEN 500 MG/1
500 TABLET, COATED ORAL
Refills: 0 | Status: DISCONTINUED | COMMUNITY
End: 2021-02-16

## 2021-02-16 RX ORDER — BLOOD-GLUCOSE METER
W/DEVICE KIT MISCELLANEOUS
Qty: 1 | Refills: 0 | Status: DISCONTINUED | COMMUNITY
Start: 2017-06-08 | End: 2021-02-16

## 2021-02-16 RX ORDER — CHLORTHALIDONE 25 MG/1
25 TABLET ORAL DAILY
Qty: 30 | Refills: 5 | Status: DISCONTINUED | COMMUNITY
Start: 2019-06-28 | End: 2021-02-16

## 2021-02-16 RX ORDER — BLOOD SUGAR DIAGNOSTIC
STRIP MISCELLANEOUS
Qty: 100 | Refills: 0 | Status: DISCONTINUED | COMMUNITY
Start: 2017-06-08 | End: 2021-02-16

## 2021-02-16 RX ORDER — BLOOD-GLUCOSE CONTROL, NORMAL
NORMAL EACH MISCELLANEOUS
Qty: 1 | Refills: 0 | Status: DISCONTINUED | COMMUNITY
Start: 2017-06-08 | End: 2021-02-16

## 2021-02-16 RX ORDER — BLOOD-GLUCOSE METER
EACH MISCELLANEOUS
Qty: 100 | Refills: 0 | Status: DISCONTINUED | COMMUNITY
Start: 2017-06-08 | End: 2021-02-16

## 2021-02-16 RX ORDER — METFORMIN HYDROCHLORIDE 1000 MG/1
1000 TABLET, COATED ORAL TWICE DAILY
Refills: 0 | Status: DISCONTINUED | COMMUNITY
End: 2021-02-16

## 2021-02-16 RX ORDER — OMEPRAZOLE 40 MG/1
40 CAPSULE, DELAYED RELEASE ORAL
Refills: 1 | Status: DISCONTINUED | COMMUNITY
End: 2021-02-16

## 2021-02-16 NOTE — HISTORY OF PRESENT ILLNESS
[FreeTextEntry1] : Interim reviewed; Creat increased to 2.4; Methotrexate stopped; Also diuretic stopped [de-identified] : As above BP readings noted off diuretic\par BP up also\par Will see nephrologist this week\par Labs done including A1C

## 2021-02-16 NOTE — HEALTH RISK ASSESSMENT
[No] : No [No falls in past year] : Patient reported no falls in the past year [0] : 2) Feeling down, depressed, or hopeless: Not at all (0) [] : No [[id number]] : 0

## 2021-02-16 NOTE — ASSESSMENT
[FreeTextEntry1] : Will see nephrologist regarding the creatinine; Off diuretic and Methotrexate; \par Will get all labs including A1C\par

## 2021-02-17 LAB
ALBUMIN SERPL ELPH-MCNC: 4.4 G/DL
ALP BLD-CCNC: 62 U/L
ALT SERPL-CCNC: 8 U/L
ANION GAP SERPL CALC-SCNC: 16 MMOL/L
AST SERPL-CCNC: 23 U/L
BASOPHILS # BLD AUTO: 0.07 K/UL
BASOPHILS NFR BLD AUTO: 1.1 %
BILIRUB SERPL-MCNC: 0.2 MG/DL
BUN SERPL-MCNC: 27 MG/DL
CALCIUM SERPL-MCNC: 9.6 MG/DL
CHLORIDE SERPL-SCNC: 103 MMOL/L
CO2 SERPL-SCNC: 20 MMOL/L
CREAT SERPL-MCNC: 2.64 MG/DL
CRP SERPL-MCNC: 0.3 MG/DL
EOSINOPHIL # BLD AUTO: 0.55 K/UL
EOSINOPHIL NFR BLD AUTO: 8.8 %
ERYTHROCYTE [SEDIMENTATION RATE] IN BLOOD BY WESTERGREN METHOD: 25 MM/HR
GLUCOSE SERPL-MCNC: 138 MG/DL
HCT VFR BLD CALC: 35.1 %
HGB BLD-MCNC: 10.3 G/DL
IMM GRANULOCYTES NFR BLD AUTO: 0.2 %
LYMPHOCYTES # BLD AUTO: 1.66 K/UL
LYMPHOCYTES NFR BLD AUTO: 26.6 %
MAN DIFF?: NORMAL
MCHC RBC-ENTMCNC: 25.9 PG
MCHC RBC-ENTMCNC: 29.3 GM/DL
MCV RBC AUTO: 88.2 FL
MONOCYTES # BLD AUTO: 0.62 K/UL
MONOCYTES NFR BLD AUTO: 9.9 %
NEUTROPHILS # BLD AUTO: 3.33 K/UL
NEUTROPHILS NFR BLD AUTO: 53.4 %
PLATELET # BLD AUTO: 234 K/UL
POTASSIUM SERPL-SCNC: 4.1 MMOL/L
PROT SERPL-MCNC: 7.1 G/DL
RBC # BLD: 3.98 M/UL
RBC # FLD: 15.9 %
SODIUM SERPL-SCNC: 139 MMOL/L
WBC # FLD AUTO: 6.24 K/UL

## 2021-02-17 NOTE — PHYSICAL EXAM
[General Appearance - Alert] : alert [General Appearance - In No Acute Distress] : in no acute distress [Sclera] : the sclera and conjunctiva were normal [Outer Ear] : the ears and nose were normal in appearance [Hearing Threshold Finger Rub Not [place]] : hearing was normal [Examination Of The Oral Cavity] : the lips and gums were normal [Neck Cervical Mass (___cm)] : no neck mass was observed [Respiration, Rhythm And Depth] : normal respiratory rhythm and effort [Auscultation Breath Sounds / Voice Sounds] : lungs were clear to auscultation bilaterally [Heart Rate And Rhythm] : heart rate was normal and rhythm regular [Heart Sounds] : normal S1 and S2 [Bowel Sounds] : normal bowel sounds [Abdomen Soft] : soft [Abdomen Tenderness] : non-tender [] : no hepato-splenomegaly [Cervical Lymph Nodes Enlarged Posterior Bilaterally] : posterior cervical [Cervical Lymph Nodes Enlarged Anterior Bilaterally] : anterior cervical [No Spinal Tenderness] : no spinal tenderness [Musculoskeletal - Swelling] : no joint swelling seen [Motor Tone] : muscle strength and tone were normal [Motor Exam] : the motor exam was normal [Sensation] : the sensory exam was normal to light touch and pinprick [Oriented To Time, Place, And Person] : oriented to person, place, and time [Affect] : the affect was normal [Mood] : the mood was normal [FreeTextEntry1] : nonblanching 5cm circular erythematous lesions on her forehead

## 2021-02-17 NOTE — HISTORY OF PRESENT ILLNESS
[___ Month(s) Ago] : [unfilled] month(s) ago [FreeTextEntry1] : Office Visit 12/22/20:\par Was taking SSZ only once per day, just increased to twice daily. \par Creatine level is stable but still 1.75 Nov 2020 \par Denies any joint pain, swelling or stiffness today \par Plan: Doing well overall \par No flares / RA stable \par Continue to monitor renal function on triple therapy - MTX dose has already been lowered in light of this\par Check labs\par \par Office Visit 9/24/20:\par Worsening of creatinine - seeing Dr. Burgess at [facility]. Most recent 1.9 (patient reported, no records) \par Last visit reduced MTX to 4mg weekly and SSZ to 500mg daily. She notes diffuse body aches since in her joints > muscles. Most severe in her shoulders, left knee and left wrist. No swelling, am stiffness about 20min. Slower with doing things but denies any significant limitation of function. \par Denies lower back pain. Remains on a stable dose of morphine. \par Plan: Attempt to taper off triple therapy, however she notes worsening joint pain. No synovitis on exam today, and thus potentially not inflammatory in etiology however she would like to increase some of the medications as she clearly notes a symptomatic change. \par Given worsening renal function will keep MTX to 10mg weekly and increase SSZ back to 1g daily. Continue Plaquenil. \par Check labs \par PRN Voltaren gel for knee pain. \par \par Office Visit 6/22/20:\par COVID IgG positive\par No joint pain \par Denies issues with medication\par Plan: Given clinical stability and rising creatinine reduce MTX to 10mg weekly and SSZ to 500mg daily. \par Continue Plaquenil and f/u with ophtho next week\par Check labs. \par \par Office Visit 3/3/20:\par Doing well \par Following with nephrology \par No joint pain or swelling, no stiffness \par Going to PT, doing the exercises \par Plan: Condition stable on triple therapy - continue\par Has been going to PT which is helping\par Check labs. \par \par Office Visit 12/4/19:\par Last set of labs with elevated ESR/CRP and rising creatinine \par She has an appt with nephro (Dr. Robledo at [place]) next week \par Denies joint pain, swelling or stiffness \par Shoulder pain has improved with PT\par Plan: Doing well - re check ESR/CRP today. Unlikely associated with arthritis given no clinically significant findings on history or exam to suggest a flare. \par Patient to follow up with nephrology next week. \par Continue triple therapy.\par \par Office Visit 8/19/19:\par Feeling well, shoulder improved\par Going to PT twice a week \par No joint pain \par Plan: Doing well on triple therapy. \par Continue current medication regimen. \par Continue PT \par \par Office Visit 7/15/19:\par No flares\par Doing well \par Plan: Doing well. Continue triple therapy. \par Check labs\par PT referral\par \par Office Visit 4/18/19:\par Joints feel good. Denies pain or swelling. \par Plan: Doing well \par Continue triple therapy. \par Check labs. \par \par Office Visit 1/18/19:\par Doing well, no joint pains \par No joint pain / swelling / stiffness \par Plan: Doing well at this time. \par No flares. \par Losing weight. Continue to encourage exercise. \par Continue current regimen of triple therapy. \par \par Office Visit 10/181/8:\par Feels well overall \par No symptoms today - denies swelling/stiffness in joints\par Plan: Doing well. \par Continue triple therapy. \par Check labs. \par Continue vitamin d and dietary calcium. \par \par Office Visit 7/19/18:\par Notes pain in her R shoulder, rarely in L also. Points to subacromial bursae when asked for site of pain. Not worse with any movements. She takes Hydrocodone for back pain and states this relieves her shoulder pain. At night time she will take Tylonel usually. \par States she is active, walks about twenty blocks a day. \par Overall feeling better and without complaints. \par Feels at her normal baseline functional status at this time. \par Plan: Doing well \par Continue current regimen\par Check labs for monitoring toxicity on current regimen. \par \par Office Visit 4/17/18:\par Patient states she feels well and denies any joint complaints today \par States her prior shoulder pain feels much better\par Currently has a cold\par Mild leukocytosis and stable anemia on lab work\par Markers of disease activity trending down \par No swelling or morning stiffness in any joints\par Plan: Continue triple therapy for now given remarkable improvement both clinically and serologically. \par Check labs for monitoring of disease activity and toxicities from MTX and SSZ. \par \par Office Visit 2/26/18:\par Patient with elevated ESR/CRP on last set of labs, mild translation of C spine on XR and nodulosis. No active arthritis. Discussed that we should escalate therapy in this setting however she was resistant to doing so at this time. She refuses to take injectable therapies. We discussed the possibility of adding Sulfasalzine for triple therapy and she stated she would think about it. She presents today and agrees to try this. \par Denies any joint pains / swelling / stiffness today. \par No new nodules. \par Plan: Given suboptimally controlled disease with elevated acute phase reactants, C spine translation and nodulosis will add Sulfasalazine 500mg BID for now. Patient to return in two weeks to re check CBC. \par \par Office Visit 2/8/17:\par Patient states she feels well. \par Notes ongoing pain in her trapezius muscles but not interested in trying a C Spine pillow as I think this has to do with the way she sleeps. \par Not interested in PT\par SC nodule on her elbow is resolving. \par Denies swelling or morning stiffness in any joints. \par Plan: She is currently happy with her medication regimen. Her arthritis is not active and thus will continue her current medication regimen of MTX 20mg and folic acid 2mg daily along with Plaquenil 200mg daily. \par Check CBC, CMP, ESR and CRP \par She never went to have the US of her SC nodule on her arm but is not interested in doing so. \par Given her kyphosis and neck pain, I have encourage patient to try PT and a C spine pillow to correct the way she sleeps. She is not interested in trying the pillow but will consider PT pending what is available. \par Management of back pain per spine specialist. \par \par Office Visit 11/9/17:\par High titre RF on last set of labs.\par Denies swelling or morning stiffness in any joints today. \par Overall has felt great since her epidural injections (by pain specialist) \par Has a nodular bony lesion overlying L forearm which does not bother her currently. States she has had this for many years and it is more painful during disease flares. \par Plan: \par -Patient currently on MTX 20mg and folic acid 2mg daily along with Plaquenil 200mg daily and has done well on this regimen. She has no flares, and eventually I think we can consider tapering down her MTX. \par -She has ongoing back pain but states that her pain is under control with her Fentanyl patch and hydrocodone. \par -Optho screening UTD for plaquenil toxicity \par -US nodular lesion on forearm  \par Office visit 9/7/17:\par Patient diagnosed with rheumatoid arthritis (unclear serologies) twenty years ago though she only started therapy in 2009 and has been on a stable dose of MTX and Plaquenil since. She states that she initially had swelling and morning stiffness in her MCPs and wrists. Currently she denies any flares - no swelling or morning stiffness in any of her joints. She states she has essentially been well controlled since 2014, and this is consistent with her notes from Dr. Mueller. Though I have no recent lab results or serologies. \par Patient has a herniated disc in her back which causes pain, has difficulty standing for prolonged periods and walking more than 5 blocks. She sees a pain specialist and has a Fentanyl patch as well as Hydrocodone tablets, for which she takes up to 5 a day. \par Recent evaluation for lower extremity swelling, has a history of vein ablation and sclerotherapy in bilateral lower extremities. She had been started on Lasix which has partially resolved the swelling and she had a US last week which ruled out a DVT. \par Patient fully independent with ADLs and iADLS.

## 2021-02-17 NOTE — ASSESSMENT
[FreeTextEntry1] : 70 year old female presents for follow up evaluation of rheumatoid arthritis \par Doing well \par Continue to hold MTX in s/o rising creatinine \par Continue Plaquenil 200mg BID and SSZ 500mg BID \par Rash possibly 2/2 Bactrim? But has been on sulfasalazine for many years without issues so low suspicion for sulfa allergy?\par Check labs today

## 2021-02-19 LAB
ALBUMIN SERPL ELPH-MCNC: 4.2 G/DL
ALP BLD-CCNC: 61 U/L
ALT SERPL-CCNC: 7 U/L
ANION GAP SERPL CALC-SCNC: 17 MMOL/L
AST SERPL-CCNC: 22 U/L
BILIRUB SERPL-MCNC: 0.2 MG/DL
BUN SERPL-MCNC: 28 MG/DL
CALCIUM SERPL-MCNC: 9.3 MG/DL
CHLORIDE SERPL-SCNC: 103 MMOL/L
CHOLEST SERPL-MCNC: 100 MG/DL
CO2 SERPL-SCNC: 19 MMOL/L
CREAT SERPL-MCNC: 2.59 MG/DL
ESTIMATED AVERAGE GLUCOSE: 160 MG/DL
GLUCOSE SERPL-MCNC: 131 MG/DL
HBA1C MFR BLD HPLC: 7.2 %
HDLC SERPL-MCNC: 32 MG/DL
LDLC SERPL CALC-MCNC: 50 MG/DL
NONHDLC SERPL-MCNC: 69 MG/DL
POTASSIUM SERPL-SCNC: 4.1 MMOL/L
PROT SERPL-MCNC: 7.1 G/DL
SODIUM SERPL-SCNC: 139 MMOL/L
TRIGL SERPL-MCNC: 93 MG/DL

## 2021-02-24 ENCOUNTER — LABORATORY RESULT (OUTPATIENT)
Age: 71
End: 2021-02-24

## 2021-02-26 LAB
APPEARANCE: ABNORMAL
BILIRUBIN URINE: NEGATIVE
BLOOD URINE: NEGATIVE
COLOR: YELLOW
GLUCOSE QUALITATIVE U: ABNORMAL
KETONES URINE: NEGATIVE
LEUKOCYTE ESTERASE URINE: NEGATIVE
NITRITE URINE: NEGATIVE
PH URINE: 5.5
PROTEIN URINE: NORMAL
SPECIFIC GRAVITY URINE: 1.02
UROBILINOGEN URINE: NORMAL

## 2021-03-04 ENCOUNTER — APPOINTMENT (OUTPATIENT)
Dept: HEART AND VASCULAR | Facility: CLINIC | Age: 71
End: 2021-03-04
Payer: MEDICARE

## 2021-03-04 ENCOUNTER — NON-APPOINTMENT (OUTPATIENT)
Age: 71
End: 2021-03-04

## 2021-03-04 VITALS
DIASTOLIC BLOOD PRESSURE: 78 MMHG | HEART RATE: 62 BPM | SYSTOLIC BLOOD PRESSURE: 160 MMHG | RESPIRATION RATE: 16 BRPM | BODY MASS INDEX: 33.66 KG/M2 | TEMPERATURE: 98 F | HEIGHT: 63 IN | WEIGHT: 190 LBS | OXYGEN SATURATION: 96 %

## 2021-03-04 PROCEDURE — 99213 OFFICE O/P EST LOW 20 MIN: CPT

## 2021-03-04 PROCEDURE — 99072 ADDL SUPL MATRL&STAF TM PHE: CPT

## 2021-03-04 PROCEDURE — 93000 ELECTROCARDIOGRAM COMPLETE: CPT

## 2021-03-04 NOTE — HISTORY OF PRESENT ILLNESS
[FreeTextEntry1] : Feels OK. Concerned about her BP since stopping chlorthalidone on recommendation of her nephrologist. He suggested adding spironolactone, as her potassium has been low. \par Reviewed recent labs and home BP brought by patient.\par Patient reports taking her listed medications as directed.\par

## 2021-05-17 ENCOUNTER — APPOINTMENT (OUTPATIENT)
Dept: RHEUMATOLOGY | Facility: CLINIC | Age: 71
End: 2021-05-17
Payer: MEDICARE

## 2021-05-17 ENCOUNTER — APPOINTMENT (OUTPATIENT)
Dept: INTERNAL MEDICINE | Facility: CLINIC | Age: 71
End: 2021-05-17
Payer: MEDICARE

## 2021-05-17 VITALS
TEMPERATURE: 97.9 F | SYSTOLIC BLOOD PRESSURE: 152 MMHG | BODY MASS INDEX: 32.78 KG/M2 | HEIGHT: 63 IN | OXYGEN SATURATION: 97 % | HEART RATE: 54 BPM | WEIGHT: 185 LBS | DIASTOLIC BLOOD PRESSURE: 81 MMHG

## 2021-05-17 PROCEDURE — 99072 ADDL SUPL MATRL&STAF TM PHE: CPT

## 2021-05-17 PROCEDURE — 99213 OFFICE O/P EST LOW 20 MIN: CPT

## 2021-05-17 PROCEDURE — 99215 OFFICE O/P EST HI 40 MIN: CPT | Mod: 25

## 2021-05-17 NOTE — ASSESSMENT
[FreeTextEntry1] : BP improved and will not change medication at this time;\par Will obtain A1C;\par Also Will not change BP med at present time;; She will follow up with \par Also obtain A!C and repeat creatinine

## 2021-05-17 NOTE — HEALTH RISK ASSESSMENT
[No] : No [No falls in past year] : Patient reported no falls in the past year [0] : 2) Feeling down, depressed, or hopeless: Not at all (0) [] : No [[id number]] : 0

## 2021-05-17 NOTE — HISTORY OF PRESENT ILLNESS
[FreeTextEntry1] : Saw Dr. Burgess at [place]; \par Creatinine decreased 2.05 [de-identified] : Dr. Casarez follow up noted; Hydralazine started\par Insulin regimen noted;  45 units at bedtime\par Being follow ed by Cardiology and also nephrology ion regular basis\par Sugars in good risk\par Taking morphine from pain management; \par Got vaccinated for Covid

## 2021-05-17 NOTE — HISTORY OF PRESENT ILLNESS
[___ Month(s) Ago] : [unfilled] month(s) ago [RF] : rheumatoid factor [CCP] : Cyclic citrullinated peptide (CCP) antibody [Erosions] : erosions [Joint Swelling] : joint swelling [Morning Stiffness] : morning stiffness [Joint Pain] : joint pain [FreeTextEntry1] : Office Visit 12/22/20:\par Was taking SSZ only once per day, just increased to twice daily. \par Creatine level is stable but still 1.75 Nov 2020 \par Denies any joint pain, swelling or stiffness today \par Plan: Doing well overall \par No flares / RA stable \par Continue to monitor renal function on triple therapy - MTX dose has already been lowered in light of this\par Check labs\par \par Office Visit 9/24/20:\par Worsening of creatinine - seeing Dr. Burgess at [facility]. Most recent 1.9 (patient reported, no records) \par Last visit reduced MTX to 4mg weekly and SSZ to 500mg daily. She notes diffuse body aches since in her joints > muscles. Most severe in her shoulders, left knee and left wrist. No swelling, am stiffness about 20min. Slower with doing things but denies any significant limitation of function. \par Denies lower back pain. Remains on a stable dose of morphine. \par Plan: Attempt to taper off triple therapy, however she notes worsening joint pain. No synovitis on exam today, and thus potentially not inflammatory in etiology however she would like to increase some of the medications as she clearly notes a symptomatic change. \par Given worsening renal function will keep MTX to 10mg weekly and increase SSZ back to 1g daily. Continue Plaquenil. \par Check labs \par PRN Voltaren gel for knee pain. \par \par Office Visit 6/22/20:\par COVID IgG positive\par No joint pain \par Denies issues with medication\par Plan: Given clinical stability and rising creatinine reduce MTX to 10mg weekly and SSZ to 500mg daily. \par Continue Plaquenil and f/u with ophtho next week\par Check labs. \par \par Office Visit 3/3/20:\par Doing well \par Following with nephrology \par No joint pain or swelling, no stiffness \par Going to PT, doing the exercises \par Plan: Condition stable on triple therapy - continue\par Has been going to PT which is helping\par Check labs. \par \par Office Visit 12/4/19:\par Last set of labs with elevated ESR/CRP and rising creatinine \par She has an appt with nephro (Dr. Robledo at [place]) next week \par Denies joint pain, swelling or stiffness \par Shoulder pain has improved with PT\par Plan: Doing well - re check ESR/CRP today. Unlikely associated with arthritis given no clinically significant findings on history or exam to suggest a flare. \par Patient to follow up with nephrology next week. \par Continue triple therapy.\par \par Office Visit 8/19/19:\par Feeling well, shoulder improved\par Going to PT twice a week \par No joint pain \par Plan: Doing well on triple therapy. \par Continue current medication regimen. \par Continue PT \par \par Office Visit 7/15/19:\par No flares\par Doing well \par Plan: Doing well. Continue triple therapy. \par Check labs\par PT referral\par \par Office Visit 4/18/19:\par Joints feel good. Denies pain or swelling. \par Plan: Doing well \par Continue triple therapy. \par Check labs. \par \par Office Visit 1/18/19:\par Doing well, no joint pains \par No joint pain / swelling / stiffness \par Plan: Doing well at this time. \par No flares. \par Losing weight. Continue to encourage exercise. \par Continue current regimen of triple therapy. \par \par Office Visit 10/181/8:\par Feels well overall \par No symptoms today - denies swelling/stiffness in joints\par Plan: Doing well. \par Continue triple therapy. \par Check labs. \par Continue vitamin d and dietary calcium. \par \par Office Visit 7/19/18:\par Notes pain in her R shoulder, rarely in L also. Points to subacromial bursae when asked for site of pain. Not worse with any movements. She takes Hydrocodone for back pain and states this relieves her shoulder pain. At night time she will take Tylonel usually. \par States she is active, walks about twenty blocks a day. \par Overall feeling better and without complaints. \par Feels at her normal baseline functional status at this time. \par Plan: Doing well \par Continue current regimen\par Check labs for monitoring toxicity on current regimen. \par \par Office Visit 4/17/18:\par Patient states she feels well and denies any joint complaints today \par States her prior shoulder pain feels much better\par Currently has a cold\par Mild leukocytosis and stable anemia on lab work\par Markers of disease activity trending down \par No swelling or morning stiffness in any joints\par Plan: Continue triple therapy for now given remarkable improvement both clinically and serologically. \par Check labs for monitoring of disease activity and toxicities from MTX and SSZ. \par \par Office Visit 2/26/18:\par Patient with elevated ESR/CRP on last set of labs, mild translation of C spine on XR and nodulosis. No active arthritis. Discussed that we should escalate therapy in this setting however she was resistant to doing so at this time. She refuses to take injectable therapies. We discussed the possibility of adding Sulfasalzine for triple therapy and she stated she would think about it. She presents today and agrees to try this. \par Denies any joint pains / swelling / stiffness today. \par No new nodules. \par Plan: Given suboptimally controlled disease with elevated acute phase reactants, C spine translation and nodulosis will add Sulfasalazine 500mg BID for now. Patient to return in two weeks to re check CBC. \par \par Office Visit 2/8/17:\par Patient states she feels well. \par Notes ongoing pain in her trapezius muscles but not interested in trying a C Spine pillow as I think this has to do with the way she sleeps. \par Not interested in PT\par SC nodule on her elbow is resolving. \par Denies swelling or morning stiffness in any joints. \par Plan: She is currently happy with her medication regimen. Her arthritis is not active and thus will continue her current medication regimen of MTX 20mg and folic acid 2mg daily along with Plaquenil 200mg daily. \par Check CBC, CMP, ESR and CRP \par She never went to have the US of her SC nodule on her arm but is not interested in doing so. \par Given her kyphosis and neck pain, I have encourage patient to try PT and a C spine pillow to correct the way she sleeps. She is not interested in trying the pillow but will consider PT pending what is available. \par Management of back pain per spine specialist. \par \par Office Visit 11/9/17:\par High titre RF on last set of labs.\par Denies swelling or morning stiffness in any joints today. \par Overall has felt great since her epidural injections (by pain specialist) \par Has a nodular bony lesion overlying L forearm which does not bother her currently. States she has had this for many years and it is more painful during disease flares. \par Plan: \par -Patient currently on MTX 20mg and folic acid 2mg daily along with Plaquenil 200mg daily and has done well on this regimen. She has no flares, and eventually I think we can consider tapering down her MTX. \par -She has ongoing back pain but states that her pain is under control with her Fentanyl patch and hydrocodone. \par -Optho screening UTD for plaquenil toxicity \par -US nodular lesion on forearm  \par Office visit 9/7/17:\par Patient diagnosed with rheumatoid arthritis (unclear serologies) twenty years ago though she only started therapy in 2009 and has been on a stable dose of MTX and Plaquenil since. She states that she initially had swelling and morning stiffness in her MCPs and wrists. Currently she denies any flares - no swelling or morning stiffness in any of her joints. She states she has essentially been well controlled since 2014, and this is consistent with her notes from Dr. Mueller. Though I have no recent lab results or serologies. \par Patient has a herniated disc in her back which causes pain, has difficulty standing for prolonged periods and walking more than 5 blocks. She sees a pain specialist and has a Fentanyl patch as well as Hydrocodone tablets, for which she takes up to 5 a day. \par Recent evaluation for lower extremity swelling, has a history of vein ablation and sclerotherapy in bilateral lower extremities. She had been started on Lasix which has partially resolved the swelling and she had a US last week which ruled out a DVT. \par Patient fully independent with ADLs and iADLS.  [Interstitial Lung Disease] : no interstitial lung disease [Ocular Disease] : no ocular disease [TextBox_38] : MTX [TextBox_40] : plaquenil and sulfasalazine

## 2021-05-17 NOTE — ASSESSMENT
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 \par Has chronic L spine DJD for which follows with pain management and takes Morphine daily \par MTX on hold given worsening creatinine \par Overall thinks RA is well controlled and no flares \par \par Continue to hold MTX in the setting of CKD stage 4 \par Continue Plaquenil 200mg BID( eye exam up to date, asked when she goes for follow up to send us Opthalmology note) \par continue  SSZ 500mg BID \par We talked about getting vectra and if disease activity lot low increase SSZ to 2g daily \par Check labs and vectra  today - labs drawn in the office today \par \par Bone health: Osteopenia on DXA from 2017 \par repeat DXA and OP labs \par \par f/u  2 months

## 2021-05-17 NOTE — REVIEW OF SYSTEMS
[As Noted in HPI] : as noted in HPI [Arthralgias] : arthralgias [Joint Pain] : joint pain [Fever] : no fever [Chills] : no chills [Eye Pain] : no eye pain [Red Eyes] : eyes not red [Dry Eyes] : no dryness of the eyes [Nasal Discharge] : no nasal discharge [Sore Throat] : no sore throat [Chest Pain] : no chest pain [Palpitations] : no palpitations [Leg Claudication] : no intermittent leg claudication [Lower Ext Edema] : no lower extremity edema [Shortness Of Breath] : no shortness of breath [Wheezing] : no wheezing [Cough] : no cough [SOB on Exertion] : no shortness of breath during exertion [Abdominal Pain] : no abdominal pain [Vomiting] : no vomiting [Constipation] : no constipation [Diarrhea] : no diarrhea [Dysuria] : no dysuria [Incontinence] : no incontinence [Joint Swelling] : no joint swelling [Joint Stiffness] : no joint stiffness [Skin Lesions] : no skin lesions [Dizziness] : no dizziness [Fainting] : no fainting [Anxiety] : no anxiety [Depression] : no depression [Easy Bleeding] : no tendency for easy bleeding [Easy Bruising] : no tendency for easy bruising [Swollen Glands] : no swollen glands [Swollen Glands In The Neck] : no swollen glands in the neck

## 2021-05-17 NOTE — PHYSICAL EXAM
[General Appearance - Alert] : alert [General Appearance - In No Acute Distress] : in no acute distress [Sclera] : the sclera and conjunctiva were normal [Outer Ear] : the ears and nose were normal in appearance [Hearing Threshold Finger Rub Not Dent] : hearing was normal [Examination Of The Oral Cavity] : the lips and gums were normal [Neck Cervical Mass (___cm)] : no neck mass was observed [Auscultation Breath Sounds / Voice Sounds] : lungs were clear to auscultation bilaterally [Respiration, Rhythm And Depth] : normal respiratory rhythm and effort [Heart Rate And Rhythm] : heart rate was normal and rhythm regular [Heart Sounds] : normal S1 and S2 [Bowel Sounds] : normal bowel sounds [Abdomen Soft] : soft [Abdomen Tenderness] : non-tender [] : no hepato-splenomegaly [Cervical Lymph Nodes Enlarged Posterior Bilaterally] : posterior cervical [Cervical Lymph Nodes Enlarged Anterior Bilaterally] : anterior cervical [No Spinal Tenderness] : no spinal tenderness [Musculoskeletal - Swelling] : no joint swelling seen [Motor Tone] : muscle strength and tone were normal [Sensation] : the sensory exam was normal to light touch and pinprick [Motor Exam] : the motor exam was normal [Oriented To Time, Place, And Person] : oriented to person, place, and time [Affect] : the affect was normal [Mood] : the mood was normal [FreeTextEntry1] : nonblanching 5cm circular erythematous lesions on her forehead

## 2021-05-18 ENCOUNTER — LABORATORY RESULT (OUTPATIENT)
Age: 71
End: 2021-05-18

## 2021-05-20 LAB
25(OH)D3 SERPL-MCNC: 58.9 NG/ML
ALBUMIN SERPL ELPH-MCNC: 4.3 G/DL
ALP BLD-CCNC: 146 U/L
ALT SERPL-CCNC: 15 U/L
ANION GAP SERPL CALC-SCNC: 13 MMOL/L
AST SERPL-CCNC: 27 U/L
BASOPHILS # BLD AUTO: 0.06 K/UL
BASOPHILS NFR BLD AUTO: 0.8 %
BILIRUB SERPL-MCNC: 0.2 MG/DL
BUN SERPL-MCNC: 17 MG/DL
CALCIUM SERPL-MCNC: 9.4 MG/DL
CALCIUM SERPL-MCNC: 9.4 MG/DL
CHLORIDE SERPL-SCNC: 103 MMOL/L
CO2 SERPL-SCNC: 24 MMOL/L
CREAT SERPL-MCNC: 1.57 MG/DL
CRP SERPL-MCNC: 8 MG/L
EOSINOPHIL # BLD AUTO: 0.17 K/UL
EOSINOPHIL NFR BLD AUTO: 2.4 %
ERYTHROCYTE [SEDIMENTATION RATE] IN BLOOD BY WESTERGREN METHOD: 20 MM/HR
ESTIMATED AVERAGE GLUCOSE: 137 MG/DL
GLUCOSE SERPL-MCNC: 142 MG/DL
HBA1C MFR BLD HPLC: 6.4 %
HBV CORE IGG+IGM SER QL: REACTIVE
HBV SURFACE AB SER QL: REACTIVE
HBV SURFACE AG SER QL: NONREACTIVE
HCT VFR BLD CALC: 38.8 %
HCV AB SER QL: REACTIVE
HCV S/CO RATIO: 7.85 S/CO
HGB BLD-MCNC: 10.7 G/DL
IMM GRANULOCYTES NFR BLD AUTO: 0.3 %
LYMPHOCYTES # BLD AUTO: 1.77 K/UL
LYMPHOCYTES NFR BLD AUTO: 25 %
MAGNESIUM SERPL-MCNC: 1.8 MG/DL
MAN DIFF?: NORMAL
MCHC RBC-ENTMCNC: 25.2 PG
MCHC RBC-ENTMCNC: 27.6 GM/DL
MCV RBC AUTO: 91.5 FL
MONOCYTES # BLD AUTO: 0.86 K/UL
MONOCYTES NFR BLD AUTO: 12.2 %
NEUTROPHILS # BLD AUTO: 4.19 K/UL
NEUTROPHILS NFR BLD AUTO: 59.3 %
PARATHYROID HORMONE INTACT: 47 PG/ML
PHOSPHATE SERPL-MCNC: 3 MG/DL
PLATELET # BLD AUTO: 183 K/UL
POTASSIUM SERPL-SCNC: 3.8 MMOL/L
PROT SERPL-MCNC: 7.1 G/DL
RBC # BLD: 4.24 M/UL
RBC # FLD: 16.1 %
SODIUM SERPL-SCNC: 140 MMOL/L
WBC # FLD AUTO: 7.07 K/UL

## 2021-05-25 ENCOUNTER — APPOINTMENT (OUTPATIENT)
Dept: INTERNAL MEDICINE | Facility: CLINIC | Age: 71
End: 2021-05-25

## 2021-06-06 LAB — VECTRADNA DISEASE ACTIVITY TEST: NORMAL

## 2021-06-10 ENCOUNTER — APPOINTMENT (OUTPATIENT)
Dept: HEART AND VASCULAR | Facility: CLINIC | Age: 71
End: 2021-06-10
Payer: MEDICARE

## 2021-06-10 VITALS
SYSTOLIC BLOOD PRESSURE: 140 MMHG | OXYGEN SATURATION: 96 % | TEMPERATURE: 98.1 F | BODY MASS INDEX: 32.42 KG/M2 | HEIGHT: 63 IN | DIASTOLIC BLOOD PRESSURE: 66 MMHG | HEART RATE: 61 BPM | WEIGHT: 182.98 LBS

## 2021-06-10 PROCEDURE — 99213 OFFICE O/P EST LOW 20 MIN: CPT

## 2021-06-10 NOTE — HISTORY OF PRESENT ILLNESS
[FreeTextEntry1] : Feeling well.\par BP at home still elevated, with most systolics in 150s-160s. Just restarted spironolactone about a week ago.\par Working with nutritionist and has made changes to diet (cutting out carbs and sugar), and has lost some weight.\par Active (doing chair exercises) and feels well with exercise.\par Patient reports taking her listed medications as directed.\par

## 2021-06-10 NOTE — PHYSICAL EXAM
[No Acute Distress] : no acute distress [Normal S1, S2] : normal S1, S2 [Clear Lung Fields] : clear lung fields [No Edema] : no edema

## 2021-07-08 ENCOUNTER — APPOINTMENT (OUTPATIENT)
Dept: RHEUMATOLOGY | Facility: CLINIC | Age: 71
End: 2021-07-08

## 2021-07-22 ENCOUNTER — APPOINTMENT (OUTPATIENT)
Dept: RHEUMATOLOGY | Facility: CLINIC | Age: 71
End: 2021-07-22
Payer: MEDICARE

## 2021-07-22 VITALS
OXYGEN SATURATION: 96 % | TEMPERATURE: 98.2 F | BODY MASS INDEX: 32.96 KG/M2 | HEART RATE: 60 BPM | SYSTOLIC BLOOD PRESSURE: 137 MMHG | DIASTOLIC BLOOD PRESSURE: 83 MMHG | HEIGHT: 63 IN | WEIGHT: 186 LBS

## 2021-07-22 DIAGNOSIS — Z86.19 PERSONAL HISTORY OF OTHER INFECTIOUS AND PARASITIC DISEASES: ICD-10-CM

## 2021-07-22 PROCEDURE — 99214 OFFICE O/P EST MOD 30 MIN: CPT | Mod: 25

## 2021-07-22 NOTE — PHYSICAL EXAM
[General Appearance - Alert] : alert [General Appearance - In No Acute Distress] : in no acute distress [Sclera] : the sclera and conjunctiva were normal [Outer Ear] : the ears and nose were normal in appearance [Hearing Threshold Finger Rub Not Chase] : hearing was normal [Examination Of The Oral Cavity] : the lips and gums were normal [Neck Cervical Mass (___cm)] : no neck mass was observed [Respiration, Rhythm And Depth] : normal respiratory rhythm and effort [Auscultation Breath Sounds / Voice Sounds] : lungs were clear to auscultation bilaterally [Heart Rate And Rhythm] : heart rate was normal and rhythm regular [Heart Sounds] : normal S1 and S2 [Bowel Sounds] : normal bowel sounds [Abdomen Soft] : soft [Abdomen Tenderness] : non-tender [] : no hepato-splenomegaly [Cervical Lymph Nodes Enlarged Posterior Bilaterally] : posterior cervical [Cervical Lymph Nodes Enlarged Anterior Bilaterally] : anterior cervical [No Spinal Tenderness] : no spinal tenderness [Musculoskeletal - Swelling] : no joint swelling seen [Motor Tone] : muscle strength and tone were normal [Sensation] : the sensory exam was normal to light touch and pinprick [Motor Exam] : the motor exam was normal [Oriented To Time, Place, And Person] : oriented to person, place, and time [Affect] : the affect was normal [Mood] : the mood was normal [FreeTextEntry1] : nonblanching 5cm circular erythematous lesions on her forehead

## 2021-07-22 NOTE — HISTORY OF PRESENT ILLNESS
[RF] : rheumatoid factor [CCP] : Cyclic citrullinated peptide (CCP) antibody [Erosions] : erosions [Joint Swelling] : joint swelling [Morning Stiffness] : morning stiffness [Joint Pain] : joint pain [___ Month(s) Ago] : [unfilled] month(s) ago [FreeTextEntry1] : Office Visit 12/22/20:\par Was taking SSZ only once per day, just increased to twice daily. \par Creatine level is stable but still 1.75 Nov 2020 \par Denies any joint pain, swelling or stiffness today \par Plan: Doing well overall \par No flares / RA stable \par Continue to monitor renal function on triple therapy - MTX dose has already been lowered in light of this\par Check labs\par \par Office Visit 9/24/20:\par Worsening of creatinine - seeing Dr. Burgess at [facility]. Most recent 1.9 (patient reported, no records) \par Last visit reduced MTX to 4mg weekly and SSZ to 500mg daily. She notes diffuse body aches since in her joints > muscles. Most severe in her shoulders, left knee and left wrist. No swelling, am stiffness about 20min. Slower with doing things but denies any significant limitation of function. \par Denies lower back pain. Remains on a stable dose of morphine. \par Plan: Attempt to taper off triple therapy, however she notes worsening joint pain. No synovitis on exam today, and thus potentially not inflammatory in etiology however she would like to increase some of the medications as she clearly notes a symptomatic change. \par Given worsening renal function will keep MTX to 10mg weekly and increase SSZ back to 1g daily. Continue Plaquenil. \par Check labs \par PRN Voltaren gel for knee pain. \par \par Office Visit 6/22/20:\par COVID IgG positive\par No joint pain \par Denies issues with medication\par Plan: Given clinical stability and rising creatinine reduce MTX to 10mg weekly and SSZ to 500mg daily. \par Continue Plaquenil and f/u with ophtho next week\par Check labs. \par \par Office Visit 3/3/20:\par Doing well \par Following with nephrology \par No joint pain or swelling, no stiffness \par Going to PT, doing the exercises \par Plan: Condition stable on triple therapy - continue\par Has been going to PT which is helping\par Check labs. \par \par Office Visit 12/4/19:\par Last set of labs with elevated ESR/CRP and rising creatinine \par She has an appt with nephro (Dr. Robledo at [facility]) next week \par Denies joint pain, swelling or stiffness \par Shoulder pain has improved with PT\par Plan: Doing well - re check ESR/CRP today. Unlikely associated with arthritis given no clinically significant findings on history or exam to suggest a flare. \par Patient to follow up with nephrology next week. \par Continue triple therapy.\par \par Office Visit 8/19/19:\par Feeling well, shoulder improved\par Going to PT twice a week \par No joint pain \par Plan: Doing well on triple therapy. \par Continue current medication regimen. \par Continue PT \par \par Office Visit 7/15/19:\par No flares\par Doing well \par Plan: Doing well. Continue triple therapy. \par Check labs\par PT referral\par \par Office Visit 4/18/19:\par Joints feel good. Denies pain or swelling. \par Plan: Doing well \par Continue triple therapy. \par Check labs. \par \par Office Visit 1/18/19:\par Doing well, no joint pains \par No joint pain / swelling / stiffness \par Plan: Doing well at this time. \par No flares. \par Losing weight. Continue to encourage exercise. \par Continue current regimen of triple therapy. \par \par Office Visit 10/181/8:\par Feels well overall \par No symptoms today - denies swelling/stiffness in joints\par Plan: Doing well. \par Continue triple therapy. \par Check labs. \par Continue vitamin d and dietary calcium. \par \par Office Visit 7/19/18:\par Notes pain in her R shoulder, rarely in L also. Points to subacromial bursae when asked for site of pain. Not worse with any movements. She takes Hydrocodone for back pain and states this relieves her shoulder pain. At night time she will take Tylonel usually. \par States she is active, walks about twenty blocks a day. \par Overall feeling better and without complaints. \par Feels at her normal baseline functional status at this time. \par Plan: Doing well \par Continue current regimen\par Check labs for monitoring toxicity on current regimen. \par \par Office Visit 4/17/18:\par Patient states she feels well and denies any joint complaints today \par States her prior shoulder pain feels much better\par Currently has a cold\par Mild leukocytosis and stable anemia on lab work\par Markers of disease activity trending down \par No swelling or morning stiffness in any joints\par Plan: Continue triple therapy for now given remarkable improvement both clinically and serologically. \par Check labs for monitoring of disease activity and toxicities from MTX and SSZ. \par \par Office Visit 2/26/18:\par Patient with elevated ESR/CRP on last set of labs, mild translation of C spine on XR and nodulosis. No active arthritis. Discussed that we should escalate therapy in this setting however she was resistant to doing so at this time. She refuses to take injectable therapies. We discussed the possibility of adding Sulfasalzine for triple therapy and she stated she would think about it. She presents today and agrees to try this. \par Denies any joint pains / swelling / stiffness today. \par No new nodules. \par Plan: Given suboptimally controlled disease with elevated acute phase reactants, C spine translation and nodulosis will add Sulfasalazine 500mg BID for now. Patient to return in two weeks to re check CBC. \par \par Office Visit 2/8/17:\par Patient states she feels well. \par Notes ongoing pain in her trapezius muscles but not interested in trying a C Spine pillow as I think this has to do with the way she sleeps. \par Not interested in PT\par SC nodule on her elbow is resolving. \par Denies swelling or morning stiffness in any joints. \par Plan: She is currently happy with her medication regimen. Her arthritis is not active and thus will continue her current medication regimen of MTX 20mg and folic acid 2mg daily along with Plaquenil 200mg daily. \par Check CBC, CMP, ESR and CRP \par She never went to have the US of her SC nodule on her arm but is not interested in doing so. \par Given her kyphosis and neck pain, I have encourage patient to try PT and a C spine pillow to correct the way she sleeps. She is not interested in trying the pillow but will consider PT pending what is available. \par Management of back pain per spine specialist. \par \par Office Visit 11/9/17:\par High titre RF on last set of labs.\par Denies swelling or morning stiffness in any joints today. \par Overall has felt great since her epidural injections (by pain specialist) \par Has a nodular bony lesion overlying L forearm which does not bother her currently. States she has had this for many years and it is more painful during disease flares. \par Plan: \par -Patient currently on MTX 20mg and folic acid 2mg daily along with Plaquenil 200mg daily and has done well on this regimen. She has no flares, and eventually I think we can consider tapering down her MTX. \par -She has ongoing back pain but states that her pain is under control with her Fentanyl patch and hydrocodone. \par -Optho screening UTD for plaquenil toxicity \par -US nodular lesion on forearm  \par Office visit 9/7/17:\par Patient diagnosed with rheumatoid arthritis (unclear serologies) twenty years ago though she only started therapy in 2009 and has been on a stable dose of MTX and Plaquenil since. She states that she initially had swelling and morning stiffness in her MCPs and wrists. Currently she denies any flares - no swelling or morning stiffness in any of her joints. She states she has essentially been well controlled since 2014, and this is consistent with her notes from Dr. Mueller. Though I have no recent lab results or serologies. \par Patient has a herniated disc in her back which causes pain, has difficulty standing for prolonged periods and walking more than 5 blocks. She sees a pain specialist and has a Fentanyl patch as well as Hydrocodone tablets, for which she takes up to 5 a day. \par Recent evaluation for lower extremity swelling, has a history of vein ablation and sclerotherapy in bilateral lower extremities. She had been started on Lasix which has partially resolved the swelling and she had a US last week which ruled out a DVT. \par Patient fully independent with ADLs and iADLS.  [Interstitial Lung Disease] : no interstitial lung disease [Ocular Disease] : no ocular disease [TextBox_38] : MTX [TextBox_40] : plaquenil and sulfasalazine

## 2021-07-22 NOTE — ASSESSMENT
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 \par Has chronic L spine DJD for which follows with pain management and takes Morphine daily \par MTX on hold given worsening creatinine \par Overall thinks RA is well controlled and no flares, but vectra with high disease activity 48  \par \par Continue to hold MTX in the setting of CKD stage 4 \par Continue Plaquenil 200mg BID( eye exam up to date, asked when she goes for follow up to send us Opthalmology note) \par c/w  SSZ 1g BID ( increased from prior visit) \par Check labs  today  CBC, Chem, ESR, CRP - labs drawn in the office today \par \par Bone health: Osteopenia on DXA from 2017 \par she has not done DEXA- reminded today, has normal OP labs \par Calcium 1200 mg daily from diet and supplements (to be taken in divided doses as no more than 500-600 mg can be absorbed at one time)\par Continue current vitamin D regimen\par Diet, exercise and fall prevention discussed\par \par \par \par f/u  3 months

## 2021-07-26 ENCOUNTER — TRANSCRIPTION ENCOUNTER (OUTPATIENT)
Age: 71
End: 2021-07-26

## 2021-07-26 LAB
ALBUMIN SERPL ELPH-MCNC: 4.7 G/DL
ALP BLD-CCNC: 105 U/L
ALT SERPL-CCNC: 6 U/L
ANION GAP SERPL CALC-SCNC: 12 MMOL/L
AST SERPL-CCNC: 17 U/L
BASOPHILS # BLD AUTO: 0.07 K/UL
BASOPHILS NFR BLD AUTO: 1 %
BILIRUB SERPL-MCNC: 0.3 MG/DL
BUN SERPL-MCNC: 31 MG/DL
CALCIUM SERPL-MCNC: 9.9 MG/DL
CHLORIDE SERPL-SCNC: 103 MMOL/L
CO2 SERPL-SCNC: 23 MMOL/L
CREAT SERPL-MCNC: 2.09 MG/DL
CRP SERPL-MCNC: <3 MG/L
EOSINOPHIL # BLD AUTO: 0.25 K/UL
EOSINOPHIL NFR BLD AUTO: 3.5 %
ERYTHROCYTE [SEDIMENTATION RATE] IN BLOOD BY WESTERGREN METHOD: 32 MM/HR
GLUCOSE SERPL-MCNC: 130 MG/DL
HCT VFR BLD CALC: 39.3 %
HGB BLD-MCNC: 11.4 G/DL
IMM GRANULOCYTES NFR BLD AUTO: 0.4 %
LYMPHOCYTES # BLD AUTO: 1.58 K/UL
LYMPHOCYTES NFR BLD AUTO: 22.1 %
MAN DIFF?: NORMAL
MCHC RBC-ENTMCNC: 25.8 PG
MCHC RBC-ENTMCNC: 29 GM/DL
MCV RBC AUTO: 88.9 FL
MONOCYTES # BLD AUTO: 0.68 K/UL
MONOCYTES NFR BLD AUTO: 9.5 %
NEUTROPHILS # BLD AUTO: 4.55 K/UL
NEUTROPHILS NFR BLD AUTO: 63.5 %
PLATELET # BLD AUTO: 209 K/UL
POTASSIUM SERPL-SCNC: 5.2 MMOL/L
PROT SERPL-MCNC: 7.6 G/DL
RBC # BLD: 4.42 M/UL
RBC # FLD: 16.1 %
SODIUM SERPL-SCNC: 139 MMOL/L
WBC # FLD AUTO: 7.16 K/UL

## 2021-08-11 ENCOUNTER — RX RENEWAL (OUTPATIENT)
Age: 71
End: 2021-08-11

## 2021-08-20 ENCOUNTER — APPOINTMENT (OUTPATIENT)
Dept: INTERNAL MEDICINE | Facility: CLINIC | Age: 71
End: 2021-08-20
Payer: MEDICARE

## 2021-08-20 VITALS — SYSTOLIC BLOOD PRESSURE: 130 MMHG | DIASTOLIC BLOOD PRESSURE: 80 MMHG

## 2021-08-20 VITALS
DIASTOLIC BLOOD PRESSURE: 72 MMHG | HEIGHT: 63 IN | BODY MASS INDEX: 32.96 KG/M2 | SYSTOLIC BLOOD PRESSURE: 151 MMHG | RESPIRATION RATE: 15 BRPM | WEIGHT: 186 LBS | OXYGEN SATURATION: 99 % | TEMPERATURE: 97.6 F | HEART RATE: 50 BPM

## 2021-08-20 PROCEDURE — 99213 OFFICE O/P EST LOW 20 MIN: CPT | Mod: 25

## 2021-08-20 NOTE — HISTORY OF PRESENT ILLNESS
[FreeTextEntry1] : All noted reviewed [de-identified] : Dr. Casarez follow up noted;\par BP continues to variable at home\par Will see Dr. Casarez  Sept 9\par Labs noted from Rheum\par followed by Renal at [facility]\par Not taking Insulin as per Endocrine\par Follows diet

## 2021-08-20 NOTE — HEALTH RISK ASSESSMENT
[No] : No [No falls in past year] : Patient reported no falls in the past year [0] : 2) Feeling down, depressed, or hopeless: Not at all (0) [] : No [[id number]] : 0

## 2021-08-26 ENCOUNTER — NON-APPOINTMENT (OUTPATIENT)
Age: 71
End: 2021-08-26

## 2021-08-26 LAB
ESTIMATED AVERAGE GLUCOSE: 171 MG/DL
HBA1C MFR BLD HPLC: 7.6 %

## 2021-09-09 ENCOUNTER — NON-APPOINTMENT (OUTPATIENT)
Age: 71
End: 2021-09-09

## 2021-09-09 ENCOUNTER — APPOINTMENT (OUTPATIENT)
Dept: HEART AND VASCULAR | Facility: CLINIC | Age: 71
End: 2021-09-09
Payer: MEDICARE

## 2021-09-09 VITALS
DIASTOLIC BLOOD PRESSURE: 67 MMHG | OXYGEN SATURATION: 97 % | BODY MASS INDEX: 33.13 KG/M2 | WEIGHT: 187 LBS | TEMPERATURE: 97.9 F | SYSTOLIC BLOOD PRESSURE: 125 MMHG | HEIGHT: 63 IN | HEART RATE: 53 BPM

## 2021-09-09 PROCEDURE — 99213 OFFICE O/P EST LOW 20 MIN: CPT

## 2021-09-09 PROCEDURE — 93000 ELECTROCARDIOGRAM COMPLETE: CPT

## 2021-09-09 RX ORDER — HYDROCODONE BITARTRATE AND ACETAMINOPHEN 5; 325 MG/1; MG/1
5-325 TABLET ORAL EVERY 6 HOURS
Refills: 0 | Status: DISCONTINUED | COMMUNITY
End: 2021-09-09

## 2021-09-09 RX ORDER — FOLIC ACID 1 MG/1
1 TABLET ORAL
Qty: 60 | Refills: 4 | Status: DISCONTINUED | COMMUNITY
Start: 2018-02-08 | End: 2021-09-09

## 2021-09-09 NOTE — HISTORY OF PRESENT ILLNESS
[FreeTextEntry1] : Feels generally well. Less joint pain, allowing for more activity. Continues to monitor her BP at home -- reviewed data from last 3 months with patient. Most readings about 140/70s, which is well below her prior average.\par No progress in losing weight, but has committed herself to making dietary changes to achieve a 20 pound weight loss.\par Patient reports taking her listed medications as directed.\par

## 2021-10-21 ENCOUNTER — APPOINTMENT (OUTPATIENT)
Dept: RHEUMATOLOGY | Facility: CLINIC | Age: 71
End: 2021-10-21
Payer: MEDICARE

## 2021-10-21 PROCEDURE — 99442: CPT

## 2021-10-21 NOTE — PHYSICAL EXAM
[General Appearance - Alert] : alert [Respiration, Rhythm And Depth] : normal respiratory rhythm and effort [Oriented To Time, Place, And Person] : oriented to person, place, and time

## 2021-10-21 NOTE — HISTORY OF PRESENT ILLNESS
[Home] : at home, [unfilled] , at the time of the visit. [Medical Office: ([facility])___] : at the medical office located in  [Verbal consent obtained from patient] : the patient, [unfilled] [___ Month(s) Ago] : [unfilled] month(s) ago [RF] : rheumatoid factor [CCP] : Cyclic citrullinated peptide (CCP) antibody [Erosions] : erosions [Joint Swelling] : joint swelling [Morning Stiffness] : morning stiffness [Joint Pain] : joint pain [FreeTextEntry1] : Office Visit 12/22/20:\par Was taking SSZ only once per day, just increased to twice daily. \par Creatine level is stable but still 1.75 Nov 2020 \par Denies any joint pain, swelling or stiffness today \par Plan: Doing well overall \par No flares / RA stable \par Continue to monitor renal function on triple therapy - MTX dose has already been lowered in light of this\par Check labs\par \par Office Visit 9/24/20:\par Worsening of creatinine - seeing Dr. Burgess at [facility]. Most recent 1.9 (patient reported, no records) \par Last visit reduced MTX to 4mg weekly and SSZ to 500mg daily. She notes diffuse body aches since in her joints > muscles. Most severe in her shoulders, left knee and left wrist. No swelling, am stiffness about 20min. Slower with doing things but denies any significant limitation of function. \par Denies lower back pain. Remains on a stable dose of morphine. \par Plan: Attempt to taper off triple therapy, however she notes worsening joint pain. No synovitis on exam today, and thus potentially not inflammatory in etiology however she would like to increase some of the medications as she clearly notes a symptomatic change. \par Given worsening renal function will keep MTX to 10mg weekly and increase SSZ back to 1g daily. Continue Plaquenil. \par Check labs \par PRN Voltaren gel for knee pain. \par \par Office Visit 6/22/20:\par COVID IgG positive\par No joint pain \par Denies issues with medication\par Plan: Given clinical stability and rising creatinine reduce MTX to 10mg weekly and SSZ to 500mg daily. \par Continue Plaquenil and f/u with ophtho next week\par Check labs. \par \par Office Visit 3/3/20:\par Doing well \par Following with nephrology \par No joint pain or swelling, no stiffness \par Going to PT, doing the exercises \par Plan: Condition stable on triple therapy - continue\par Has been going to PT which is helping\par Check labs. \par \par Office Visit 12/4/19:\par Last set of labs with elevated ESR/CRP and rising creatinine \par She has an appt with nephro (Dr. Robledo at [place]) next week \par Denies joint pain, swelling or stiffness \par Shoulder pain has improved with PT\par Plan: Doing well - re check ESR/CRP today. Unlikely associated with arthritis given no clinically significant findings on history or exam to suggest a flare. \par Patient to follow up with nephrology next week. \par Continue triple therapy.\par \par Office Visit 8/19/19:\par Feeling well, shoulder improved\par Going to PT twice a week \par No joint pain \par Plan: Doing well on triple therapy. \par Continue current medication regimen. \par Continue PT \par \par Office Visit 7/15/19:\par No flares\par Doing well \par Plan: Doing well. Continue triple therapy. \par Check labs\par PT referral\par \par Office Visit 4/18/19:\par Joints feel good. Denies pain or swelling. \par Plan: Doing well \par Continue triple therapy. \par Check labs. \par \par Office Visit 1/18/19:\par Doing well, no joint pains \par No joint pain / swelling / stiffness \par Plan: Doing well at this time. \par No flares. \par Losing weight. Continue to encourage exercise. \par Continue current regimen of triple therapy. \par \par Office Visit 10/181/8:\par Feels well overall \par No symptoms today - denies swelling/stiffness in joints\par Plan: Doing well. \par Continue triple therapy. \par Check labs. \par Continue vitamin d and dietary calcium. \par \par Office Visit 7/19/18:\par Notes pain in her R shoulder, rarely in L also. Points to subacromial bursae when asked for site of pain. Not worse with any movements. She takes Hydrocodone for back pain and states this relieves her shoulder pain. At night time she will take Tylonel usually. \par States she is active, walks about twenty blocks a day. \par Overall feeling better and without complaints. \par Feels at her normal baseline functional status at this time. \par Plan: Doing well \par Continue current regimen\par Check labs for monitoring toxicity on current regimen. \par \par Office Visit 4/17/18:\par Patient states she feels well and denies any joint complaints today \par States her prior shoulder pain feels much better\par Currently has a cold\par Mild leukocytosis and stable anemia on lab work\par Markers of disease activity trending down \par No swelling or morning stiffness in any joints\par Plan: Continue triple therapy for now given remarkable improvement both clinically and serologically. \par Check labs for monitoring of disease activity and toxicities from MTX and SSZ. \par \par Office Visit 2/26/18:\par Patient with elevated ESR/CRP on last set of labs, mild translation of C spine on XR and nodulosis. No active arthritis. Discussed that we should escalate therapy in this setting however she was resistant to doing so at this time. She refuses to take injectable therapies. We discussed the possibility of adding Sulfasalzine for triple therapy and she stated she would think about it. She presents today and agrees to try this. \par Denies any joint pains / swelling / stiffness today. \par No new nodules. \par Plan: Given suboptimally controlled disease with elevated acute phase reactants, C spine translation and nodulosis will add Sulfasalazine 500mg BID for now. Patient to return in two weeks to re check CBC. \par \par Office Visit 2/8/17:\par Patient states she feels well. \par Notes ongoing pain in her trapezius muscles but not interested in trying a C Spine pillow as I think this has to do with the way she sleeps. \par Not interested in PT\par SC nodule on her elbow is resolving. \par Denies swelling or morning stiffness in any joints. \par Plan: She is currently happy with her medication regimen. Her arthritis is not active and thus will continue her current medication regimen of MTX 20mg and folic acid 2mg daily along with Plaquenil 200mg daily. \par Check CBC, CMP, ESR and CRP \par She never went to have the US of her SC nodule on her arm but is not interested in doing so. \par Given her kyphosis and neck pain, I have encourage patient to try PT and a C spine pillow to correct the way she sleeps. She is not interested in trying the pillow but will consider PT pending what is available. \par Management of back pain per spine specialist. \par \par Office Visit 11/9/17:\par High titre RF on last set of labs.\par Denies swelling or morning stiffness in any joints today. \par Overall has felt great since her epidural injections (by pain specialist) \par Has a nodular bony lesion overlying L forearm which does not bother her currently. States she has had this for many years and it is more painful during disease flares. \par Plan: \par -Patient currently on MTX 20mg and folic acid 2mg daily along with Plaquenil 200mg daily and has done well on this regimen. She has no flares, and eventually I think we can consider tapering down her MTX. \par -She has ongoing back pain but states that her pain is under control with her Fentanyl patch and hydrocodone. \par -Optho screening UTD for plaquenil toxicity \par -US nodular lesion on forearm  \par Office visit 9/7/17:\par Patient diagnosed with rheumatoid arthritis (unclear serologies) twenty years ago though she only started therapy in 2009 and has been on a stable dose of MTX and Plaquenil since. She states that she initially had swelling and morning stiffness in her MCPs and wrists. Currently she denies any flares - no swelling or morning stiffness in any of her joints. She states she has essentially been well controlled since 2014, and this is consistent with her notes from Dr. Mueller. Though I have no recent lab results or serologies. \par Patient has a herniated disc in her back which causes pain, has difficulty standing for prolonged periods and walking more than 5 blocks. She sees a pain specialist and has a Fentanyl patch as well as Hydrocodone tablets, for which she takes up to 5 a day. \par Recent evaluation for lower extremity swelling, has a history of vein ablation and sclerotherapy in bilateral lower extremities. She had been started on Lasix which has partially resolved the swelling and she had a US last week which ruled out a DVT. \par Patient fully independent with ADLs and iADLS.  [Interstitial Lung Disease] : no interstitial lung disease [Ocular Disease] : no ocular disease [TextBox_38] : MTX [TextBox_40] : plaquenil and sulfasalazine

## 2021-10-21 NOTE — ASSESSMENT
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 \par Has chronic L spine DJD for which follows with pain management and takes Morphine daily \par Overall thinks RA is well controlled and no flares, but vectra with high disease activity 48  \par \par Continue to hold off MTX in the setting of CKD stage 4 \par Continue Plaquenil 200mg BID( eye exam up to date, no maculopathy) \par c/w  SSZ 1g BID \par \par Bone health:\par I reviewed the DXA done on 07/24/21 \par I reviewed labs with patient , has normal PTH and Ca\par Osteoporosis treatment options discussed in great detail  \par Recommendation is pharmacologic therapy for postmenopausal women with a history of fragility fracture or with osteoporosis based upon bone mineral density (BMD) measurement (T-score =-2.5)\par For most postmenopausal women with osteoporosis, we suggest oral bisphosphonates as first-line therapy but given her stage of CKD she can not take bisphosphonates and alternatively prolia can be a option. \par \par Denosumab is given in the clinic at a dose of 60 mg subcutaneously (SC) every 6 months. This medication is well tolerated, although there is a concern that infections\par side effects such as \par Dermatologic: Skin rash , dermatitis , eczema \par Endocrine & metabolic: Hypophosphatemia, hypocalcemia\par Gastrointestinal: Diarrhea, nausea , decreased appetite, vomiting, constipation \par Hematologic & oncologic: Anemia, thrombocytopenia \par Respiratory: Dyspnea , cough, upper respiratory tract infection (Prolia: 5%)\par ONJ and atypical femur fractures will have been reported in patients on denosumab. \par \par Patients with chronic renal disease are most likely to get hypocalcemia with denosumab she was warned of this complication.\par There is an increased risk of vertebral fractures, particularly multiple vertebral fractures, after stopping denosumab. \par If denosumab is discontinued, administering an alternative therapy  to prevent rapid bone loss and vertebral fracture is advised.\par \par She will discuss with her daughter, who is the nurse and get back to me with the decision. \par \par Calcium 1200 mg daily from diet and supplements (to be taken in divided doses as no more than 500-600 mg can be absorbed at one time)\par Continue current vitamin D regimen\par Diet, exercise and fall prevention discussed\par \par \par f/u  2 months

## 2021-11-18 ENCOUNTER — APPOINTMENT (OUTPATIENT)
Dept: INTERNAL MEDICINE | Facility: CLINIC | Age: 71
End: 2021-11-18

## 2021-11-23 ENCOUNTER — APPOINTMENT (OUTPATIENT)
Dept: INTERNAL MEDICINE | Facility: CLINIC | Age: 71
End: 2021-11-23
Payer: MEDICARE

## 2021-11-23 VITALS
TEMPERATURE: 98 F | WEIGHT: 188 LBS | HEART RATE: 51 BPM | OXYGEN SATURATION: 96 % | HEIGHT: 63 IN | SYSTOLIC BLOOD PRESSURE: 132 MMHG | DIASTOLIC BLOOD PRESSURE: 74 MMHG | BODY MASS INDEX: 33.31 KG/M2

## 2021-11-23 PROCEDURE — 99213 OFFICE O/P EST LOW 20 MIN: CPT | Mod: 25

## 2021-11-23 NOTE — HISTORY OF PRESENT ILLNESS
[FreeTextEntry1] : No complaint; \par Consultants follow up noted [de-identified] : Medication without change;\par Sugars have been stable

## 2021-12-02 ENCOUNTER — NON-APPOINTMENT (OUTPATIENT)
Age: 71
End: 2021-12-02

## 2021-12-02 LAB
ALBUMIN SERPL ELPH-MCNC: 4.4 G/DL
ALP BLD-CCNC: 57 U/L
ALT SERPL-CCNC: 7 U/L
ANION GAP SERPL CALC-SCNC: 14 MMOL/L
AST SERPL-CCNC: 16 U/L
BASOPHILS # BLD AUTO: 0.06 K/UL
BASOPHILS NFR BLD AUTO: 0.9 %
BILIRUB SERPL-MCNC: 0.2 MG/DL
BUN SERPL-MCNC: 30 MG/DL
CALCIUM SERPL-MCNC: 9.6 MG/DL
CHLORIDE SERPL-SCNC: 104 MMOL/L
CO2 SERPL-SCNC: 20 MMOL/L
CREAT SERPL-MCNC: 2.06 MG/DL
EOSINOPHIL # BLD AUTO: 0.16 K/UL
EOSINOPHIL NFR BLD AUTO: 2.4 %
ESTIMATED AVERAGE GLUCOSE: 186 MG/DL
GLUCOSE SERPL-MCNC: 105 MG/DL
HBA1C MFR BLD HPLC: 8.1 %
HCT VFR BLD CALC: 37.8 %
HGB BLD-MCNC: 11.1 G/DL
IMM GRANULOCYTES NFR BLD AUTO: 0.2 %
LYMPHOCYTES # BLD AUTO: 1.63 K/UL
LYMPHOCYTES NFR BLD AUTO: 24.9 %
MAN DIFF?: NORMAL
MCHC RBC-ENTMCNC: 25.9 PG
MCHC RBC-ENTMCNC: 29.4 GM/DL
MCV RBC AUTO: 88.1 FL
MONOCYTES # BLD AUTO: 0.6 K/UL
MONOCYTES NFR BLD AUTO: 9.2 %
NEUTROPHILS # BLD AUTO: 4.09 K/UL
NEUTROPHILS NFR BLD AUTO: 62.4 %
PLATELET # BLD AUTO: 212 K/UL
POTASSIUM SERPL-SCNC: 4.8 MMOL/L
PROT SERPL-MCNC: 7.6 G/DL
RBC # BLD: 4.29 M/UL
RBC # FLD: 14.3 %
SODIUM SERPL-SCNC: 138 MMOL/L
WBC # FLD AUTO: 6.55 K/UL

## 2021-12-09 ENCOUNTER — APPOINTMENT (OUTPATIENT)
Dept: HEART AND VASCULAR | Facility: CLINIC | Age: 71
End: 2021-12-09
Payer: MEDICARE

## 2021-12-09 VITALS
SYSTOLIC BLOOD PRESSURE: 135 MMHG | DIASTOLIC BLOOD PRESSURE: 67 MMHG | HEART RATE: 61 BPM | WEIGHT: 188 LBS | HEIGHT: 63 IN | OXYGEN SATURATION: 97 % | BODY MASS INDEX: 33.31 KG/M2 | TEMPERATURE: 97 F

## 2021-12-09 DIAGNOSIS — Z86.79 PERSONAL HISTORY OF OTHER DISEASES OF THE CIRCULATORY SYSTEM: ICD-10-CM

## 2021-12-09 PROCEDURE — 99212 OFFICE O/P EST SF 10 MIN: CPT

## 2021-12-09 NOTE — PHYSICAL EXAM
[No Acute Distress] : no acute distress [Clear Lung Fields] : clear lung fields [No Respiratory Distress] : no respiratory distress  [No Edema] : no edema

## 2021-12-09 NOTE — REASON FOR VISIT
[FreeTextEntry1] : Feeling generally well. Still struggling to lose weight and trying different approaches -- now with limiting carbs. Understands importance of weight loss to overall health.\par Monitoring BP at home and reports readings of 135-140/70-80 mmHg. \par Creatinine 2.0 by her report.\par Walking regularly. Arthritis pain under control. \par Patient reports taking her listed medications as directed.\par

## 2021-12-27 ENCOUNTER — APPOINTMENT (OUTPATIENT)
Dept: RHEUMATOLOGY | Facility: CLINIC | Age: 71
End: 2021-12-27
Payer: MEDICARE

## 2021-12-27 VITALS
OXYGEN SATURATION: 98 % | HEART RATE: 59 BPM | HEIGHT: 63 IN | BODY MASS INDEX: 32.96 KG/M2 | DIASTOLIC BLOOD PRESSURE: 86 MMHG | WEIGHT: 186 LBS | SYSTOLIC BLOOD PRESSURE: 159 MMHG | TEMPERATURE: 98.6 F

## 2021-12-27 PROCEDURE — 99214 OFFICE O/P EST MOD 30 MIN: CPT

## 2021-12-27 NOTE — PHYSICAL EXAM
[General Appearance - Alert] : alert [Respiration, Rhythm And Depth] : normal respiratory rhythm and effort [Oriented To Time, Place, And Person] : oriented to person, place, and time [General Appearance - In No Acute Distress] : in no acute distress [Sclera] : the sclera and conjunctiva were normal [Outer Ear] : the ears and nose were normal in appearance [Exaggerated Use Of Accessory Muscles For Inspiration] : no accessory muscle use [Auscultation Breath Sounds / Voice Sounds] : lungs were clear to auscultation bilaterally [Heart Rate And Rhythm] : heart rate was normal and rhythm regular [Heart Sounds] : normal S1 and S2 [Abnormal Walk] : normal gait [Nail Clubbing] : no clubbing  or cyanosis of the fingernails [Musculoskeletal - Swelling] : no joint swelling seen [Motor Tone] : muscle strength and tone were normal [] : no rash [Skin Lesions] : no skin lesions

## 2021-12-27 NOTE — HISTORY OF PRESENT ILLNESS
[___ Month(s) Ago] : [unfilled] month(s) ago [FreeTextEntry1] : Office Visit 12/22/20:\par Was taking SSZ only once per day, just increased to twice daily. \par Creatine level is stable but still 1.75 Nov 2020 \par Denies any joint pain, swelling or stiffness today \par Plan: Doing well overall \par No flares / RA stable \par Continue to monitor renal function on triple therapy - MTX dose has already been lowered in light of this\par Check labs\par \par Office Visit 9/24/20:\par Worsening of creatinine - seeing Dr. Burgess at [facility]. Most recent 1.9 (patient reported, no records) \par Last visit reduced MTX to 4mg weekly and SSZ to 500mg daily. She notes diffuse body aches since in her joints > muscles. Most severe in her shoulders, left knee and left wrist. No swelling, am stiffness about 20min. Slower with doing things but denies any significant limitation of function. \par Denies lower back pain. Remains on a stable dose of morphine. \par Plan: Attempt to taper off triple therapy, however she notes worsening joint pain. No synovitis on exam today, and thus potentially not inflammatory in etiology however she would like to increase some of the medications as she clearly notes a symptomatic change. \par Given worsening renal function will keep MTX to 10mg weekly and increase SSZ back to 1g daily. Continue Plaquenil. \par Check labs \par PRN Voltaren gel for knee pain. \par \par Office Visit 6/22/20:\par COVID IgG positive\par No joint pain \par Denies issues with medication\par Plan: Given clinical stability and rising creatinine reduce MTX to 10mg weekly and SSZ to 500mg daily. \par Continue Plaquenil and f/u with ophtho next week\par Check labs. \par \par Office Visit 3/3/20:\par Doing well \par Following with nephrology \par No joint pain or swelling, no stiffness \par Going to PT, doing the exercises \par Plan: Condition stable on triple therapy - continue\par Has been going to PT which is helping\par Check labs. \par \par Office Visit 12/4/19:\par Last set of labs with elevated ESR/CRP and rising creatinine \par She has an appt with nephro (Dr. Robledo at [place]) next week \par Denies joint pain, swelling or stiffness \par Shoulder pain has improved with PT\par Plan: Doing well - re check ESR/CRP today. Unlikely associated with arthritis given no clinically significant findings on history or exam to suggest a flare. \par Patient to follow up with nephrology next week. \par Continue triple therapy.\par \par Office Visit 8/19/19:\par Feeling well, shoulder improved\par Going to PT twice a week \par No joint pain \par Plan: Doing well on triple therapy. \par Continue current medication regimen. \par Continue PT \par \par Office Visit 7/15/19:\par No flares\par Doing well \par Plan: Doing well. Continue triple therapy. \par Check labs\par PT referral\par \par Office Visit 4/18/19:\par Joints feel good. Denies pain or swelling. \par Plan: Doing well \par Continue triple therapy. \par Check labs. \par \par Office Visit 1/18/19:\par Doing well, no joint pains \par No joint pain / swelling / stiffness \par Plan: Doing well at this time. \par No flares. \par Losing weight. Continue to encourage exercise. \par Continue current regimen of triple therapy. \par \par Office Visit 10/181/8:\par Feels well overall \par No symptoms today - denies swelling/stiffness in joints\par Plan: Doing well. \par Continue triple therapy. \par Check labs. \par Continue vitamin d and dietary calcium. \par \par Office Visit 7/19/18:\par Notes pain in her R shoulder, rarely in L also. Points to subacromial bursae when asked for site of pain. Not worse with any movements. She takes Hydrocodone for back pain and states this relieves her shoulder pain. At night time she will take Tylonel usually. \par States she is active, walks about twenty blocks a day. \par Overall feeling better and without complaints. \par Feels at her normal baseline functional status at this time. \par Plan: Doing well \par Continue current regimen\par Check labs for monitoring toxicity on current regimen. \par \par Office Visit 4/17/18:\par Patient states she feels well and denies any joint complaints today \par States her prior shoulder pain feels much better\par Currently has a cold\par Mild leukocytosis and stable anemia on lab work\par Markers of disease activity trending down \par No swelling or morning stiffness in any joints\par Plan: Continue triple therapy for now given remarkable improvement both clinically and serologically. \par Check labs for monitoring of disease activity and toxicities from MTX and SSZ. \par \par Office Visit 2/26/18:\par Patient with elevated ESR/CRP on last set of labs, mild translation of C spine on XR and nodulosis. No active arthritis. Discussed that we should escalate therapy in this setting however she was resistant to doing so at this time. She refuses to take injectable therapies. We discussed the possibility of adding Sulfasalzine for triple therapy and she stated she would think about it. She presents today and agrees to try this. \par Denies any joint pains / swelling / stiffness today. \par No new nodules. \par Plan: Given suboptimally controlled disease with elevated acute phase reactants, C spine translation and nodulosis will add Sulfasalazine 500mg BID for now. Patient to return in two weeks to re check CBC. \par \par Office Visit 2/8/17:\par Patient states she feels well. \par Notes ongoing pain in her trapezius muscles but not interested in trying a C Spine pillow as I think this has to do with the way she sleeps. \par Not interested in PT\par SC nodule on her elbow is resolving. \par Denies swelling or morning stiffness in any joints. \par Plan: She is currently happy with her medication regimen. Her arthritis is not active and thus will continue her current medication regimen of MTX 20mg and folic acid 2mg daily along with Plaquenil 200mg daily. \par Check CBC, CMP, ESR and CRP \par She never went to have the US of her SC nodule on her arm but is not interested in doing so. \par Given her kyphosis and neck pain, I have encourage patient to try PT and a C spine pillow to correct the way she sleeps. She is not interested in trying the pillow but will consider PT pending what is available. \par Management of back pain per spine specialist. \par \par Office Visit 11/9/17:\par High titre RF on last set of labs.\par Denies swelling or morning stiffness in any joints today. \par Overall has felt great since her epidural injections (by pain specialist) \par Has a nodular bony lesion overlying L forearm which does not bother her currently. States she has had this for many years and it is more painful during disease flares. \par Plan: \par -Patient currently on MTX 20mg and folic acid 2mg daily along with Plaquenil 200mg daily and has done well on this regimen. She has no flares, and eventually I think we can consider tapering down her MTX. \par -She has ongoing back pain but states that her pain is under control with her Fentanyl patch and hydrocodone. \par -Optho screening UTD for plaquenil toxicity \par -US nodular lesion on forearm  \par Office visit 9/7/17:\par Patient diagnosed with rheumatoid arthritis (unclear serologies) twenty years ago though she only started therapy in 2009 and has been on a stable dose of MTX and Plaquenil since. She states that she initially had swelling and morning stiffness in her MCPs and wrists. Currently she denies any flares - no swelling or morning stiffness in any of her joints. She states she has essentially been well controlled since 2014, and this is consistent with her notes from Dr. Mueller. Though I have no recent lab results or serologies. \par Patient has a herniated disc in her back which causes pain, has difficulty standing for prolonged periods and walking more than 5 blocks. She sees a pain specialist and has a Fentanyl patch as well as Hydrocodone tablets, for which she takes up to 5 a day. \par Recent evaluation for lower extremity swelling, has a history of vein ablation and sclerotherapy in bilateral lower extremities. She had been started on Lasix which has partially resolved the swelling and she had a US last week which ruled out a DVT. \par Patient fully independent with ADLs and iADLS.

## 2021-12-27 NOTE — ASSESSMENT
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 \par Has chronic L spine DJD for which follows with pain management and takes Morphine daily \par Overall thinks RA is well controlled and no flares, but vectra with high disease activity 48  \par \par Continue to hold off MTX in the setting of CKD stage 4 \par Continue Plaquenil 200mg BID( eye exam up to date, no maculopathy) \par c/w  SSZ 1g BID (meds renewed today) \par \par \par Bone health- Osteoporosis on DXA done on 07/24/21 \par She has been declining OP treatment; previously and today prolia discussed. \par Calcium 1200 mg daily from diet and supplements (to be taken in divided doses as no more than 500-600 mg can be absorbed at one time)\par Continue current vitamin D 1000IU regimen\par Diet, exercise and fall prevention discussed\par \par \par f/u 4  months

## 2022-01-25 ENCOUNTER — APPOINTMENT (OUTPATIENT)
Dept: INTERNAL MEDICINE | Facility: CLINIC | Age: 72
End: 2022-01-25

## 2022-02-23 ENCOUNTER — INPATIENT (INPATIENT)
Facility: HOSPITAL | Age: 72
LOS: 0 days | Discharge: NURSING, REHAB OR LONG-TERM CARE | DRG: 560 | End: 2022-02-24
Attending: INTERNAL MEDICINE | Admitting: INTERNAL MEDICINE
Payer: MEDICARE

## 2022-02-23 VITALS
OXYGEN SATURATION: 97 % | TEMPERATURE: 99 F | HEART RATE: 82 BPM | DIASTOLIC BLOOD PRESSURE: 84 MMHG | HEIGHT: 63 IN | SYSTOLIC BLOOD PRESSURE: 147 MMHG | WEIGHT: 182.98 LBS | RESPIRATION RATE: 17 BRPM

## 2022-02-23 DIAGNOSIS — E78.5 HYPERLIPIDEMIA, UNSPECIFIED: ICD-10-CM

## 2022-02-23 DIAGNOSIS — R63.8 OTHER SYMPTOMS AND SIGNS CONCERNING FOOD AND FLUID INTAKE: ICD-10-CM

## 2022-02-23 DIAGNOSIS — M47.816 SPONDYLOSIS WITHOUT MYELOPATHY OR RADICULOPATHY, LUMBAR REGION: ICD-10-CM

## 2022-02-23 DIAGNOSIS — I10 ESSENTIAL (PRIMARY) HYPERTENSION: ICD-10-CM

## 2022-02-23 DIAGNOSIS — Z96.641 PRESENCE OF RIGHT ARTIFICIAL HIP JOINT: Chronic | ICD-10-CM

## 2022-02-23 DIAGNOSIS — S73.004A UNSPECIFIED DISLOCATION OF RIGHT HIP, INITIAL ENCOUNTER: ICD-10-CM

## 2022-02-23 DIAGNOSIS — E11.9 TYPE 2 DIABETES MELLITUS WITHOUT COMPLICATIONS: ICD-10-CM

## 2022-02-23 DIAGNOSIS — M06.9 RHEUMATOID ARTHRITIS, UNSPECIFIED: ICD-10-CM

## 2022-02-23 DIAGNOSIS — N18.4 CHRONIC KIDNEY DISEASE, STAGE 4 (SEVERE): ICD-10-CM

## 2022-02-23 DIAGNOSIS — M85.80 OTHER SPECIFIED DISORDERS OF BONE DENSITY AND STRUCTURE, UNSPECIFIED SITE: ICD-10-CM

## 2022-02-23 LAB
ALBUMIN SERPL ELPH-MCNC: 3.3 G/DL — SIGNIFICANT CHANGE UP (ref 3.3–5)
ALP SERPL-CCNC: 69 U/L — SIGNIFICANT CHANGE UP (ref 40–120)
ALT FLD-CCNC: 7 U/L — LOW (ref 10–45)
ANION GAP SERPL CALC-SCNC: 12 MMOL/L — SIGNIFICANT CHANGE UP (ref 5–17)
APTT BLD: 25.7 SEC — LOW (ref 27.5–35.5)
AST SERPL-CCNC: 20 U/L — SIGNIFICANT CHANGE UP (ref 10–40)
BASOPHILS # BLD AUTO: 0.05 K/UL — SIGNIFICANT CHANGE UP (ref 0–0.2)
BASOPHILS NFR BLD AUTO: 0.5 % — SIGNIFICANT CHANGE UP (ref 0–2)
BILIRUB SERPL-MCNC: 0.2 MG/DL — SIGNIFICANT CHANGE UP (ref 0.2–1.2)
BLD GP AB SCN SERPL QL: NEGATIVE — SIGNIFICANT CHANGE UP
BUN SERPL-MCNC: 14 MG/DL — SIGNIFICANT CHANGE UP (ref 7–23)
CALCIUM SERPL-MCNC: 9.7 MG/DL — SIGNIFICANT CHANGE UP (ref 8.4–10.5)
CHLORIDE SERPL-SCNC: 109 MMOL/L — HIGH (ref 96–108)
CO2 SERPL-SCNC: 22 MMOL/L — SIGNIFICANT CHANGE UP (ref 22–31)
CREAT SERPL-MCNC: 1.1 MG/DL — SIGNIFICANT CHANGE UP (ref 0.5–1.3)
EOSINOPHIL # BLD AUTO: 0.21 K/UL — SIGNIFICANT CHANGE UP (ref 0–0.5)
EOSINOPHIL NFR BLD AUTO: 2.1 % — SIGNIFICANT CHANGE UP (ref 0–6)
GLUCOSE SERPL-MCNC: 149 MG/DL — HIGH (ref 70–99)
HCT VFR BLD CALC: 30.6 % — LOW (ref 34.5–45)
HGB BLD-MCNC: 9.4 G/DL — LOW (ref 11.5–15.5)
IMM GRANULOCYTES NFR BLD AUTO: 1.3 % — SIGNIFICANT CHANGE UP (ref 0–1.5)
INR BLD: 1.17 — HIGH (ref 0.88–1.16)
LYMPHOCYTES # BLD AUTO: 1.21 K/UL — SIGNIFICANT CHANGE UP (ref 1–3.3)
LYMPHOCYTES # BLD AUTO: 12.2 % — LOW (ref 13–44)
MCHC RBC-ENTMCNC: 26 PG — LOW (ref 27–34)
MCHC RBC-ENTMCNC: 30.7 GM/DL — LOW (ref 32–36)
MCV RBC AUTO: 84.8 FL — SIGNIFICANT CHANGE UP (ref 80–100)
MONOCYTES # BLD AUTO: 0.92 K/UL — HIGH (ref 0–0.9)
MONOCYTES NFR BLD AUTO: 9.3 % — SIGNIFICANT CHANGE UP (ref 2–14)
NEUTROPHILS # BLD AUTO: 7.38 K/UL — SIGNIFICANT CHANGE UP (ref 1.8–7.4)
NEUTROPHILS NFR BLD AUTO: 74.6 % — SIGNIFICANT CHANGE UP (ref 43–77)
NRBC # BLD: 0 /100 WBCS — SIGNIFICANT CHANGE UP (ref 0–0)
PLATELET # BLD AUTO: 320 K/UL — SIGNIFICANT CHANGE UP (ref 150–400)
POTASSIUM SERPL-MCNC: 4.2 MMOL/L — SIGNIFICANT CHANGE UP (ref 3.5–5.3)
POTASSIUM SERPL-SCNC: 4.2 MMOL/L — SIGNIFICANT CHANGE UP (ref 3.5–5.3)
PROT SERPL-MCNC: 7.2 G/DL — SIGNIFICANT CHANGE UP (ref 6–8.3)
PROTHROM AB SERPL-ACNC: 14 SEC — HIGH (ref 10.5–13.4)
RBC # BLD: 3.61 M/UL — LOW (ref 3.8–5.2)
RBC # FLD: 15.1 % — HIGH (ref 10.3–14.5)
RH IG SCN BLD-IMP: POSITIVE — SIGNIFICANT CHANGE UP
SARS-COV-2 RNA SPEC QL NAA+PROBE: SIGNIFICANT CHANGE UP
SODIUM SERPL-SCNC: 143 MMOL/L — SIGNIFICANT CHANGE UP (ref 135–145)
WBC # BLD: 9.9 K/UL — SIGNIFICANT CHANGE UP (ref 3.8–10.5)
WBC # FLD AUTO: 9.9 K/UL — SIGNIFICANT CHANGE UP (ref 3.8–10.5)

## 2022-02-23 PROCEDURE — 99156 MOD SED OTH PHYS/QHP 5/>YRS: CPT

## 2022-02-23 PROCEDURE — 73502 X-RAY EXAM HIP UNI 2-3 VIEWS: CPT | Mod: 26,RT

## 2022-02-23 PROCEDURE — 99285 EMERGENCY DEPT VISIT HI MDM: CPT | Mod: 25

## 2022-02-23 RX ORDER — PROPOFOL 10 MG/ML
10 INJECTION, EMULSION INTRAVENOUS ONCE
Refills: 0 | Status: COMPLETED | OUTPATIENT
Start: 2022-02-23 | End: 2022-02-23

## 2022-02-23 RX ORDER — INSULIN LISPRO 100/ML
VIAL (ML) SUBCUTANEOUS
Refills: 0 | Status: DISCONTINUED | OUTPATIENT
Start: 2022-02-23 | End: 2022-02-24

## 2022-02-23 RX ORDER — ACETAMINOPHEN 500 MG
650 TABLET ORAL EVERY 6 HOURS
Refills: 0 | Status: DISCONTINUED | OUTPATIENT
Start: 2022-02-23 | End: 2022-02-23

## 2022-02-23 RX ORDER — PROPOFOL 10 MG/ML
60 INJECTION, EMULSION INTRAVENOUS ONCE
Refills: 0 | Status: COMPLETED | OUTPATIENT
Start: 2022-02-23 | End: 2022-02-23

## 2022-02-23 RX ORDER — PREGABALIN 225 MG/1
1000 CAPSULE ORAL DAILY
Refills: 0 | Status: DISCONTINUED | OUTPATIENT
Start: 2022-02-23 | End: 2022-02-24

## 2022-02-23 RX ORDER — ASPIRIN/CALCIUM CARB/MAGNESIUM 324 MG
81 TABLET ORAL DAILY
Refills: 0 | Status: DISCONTINUED | OUTPATIENT
Start: 2022-02-23 | End: 2022-02-24

## 2022-02-23 RX ORDER — DEXTROSE 50 % IN WATER 50 %
15 SYRINGE (ML) INTRAVENOUS ONCE
Refills: 0 | Status: DISCONTINUED | OUTPATIENT
Start: 2022-02-23 | End: 2022-02-24

## 2022-02-23 RX ORDER — CALCIUM CARBONATE 500(1250)
1 TABLET ORAL
Refills: 0 | Status: DISCONTINUED | OUTPATIENT
Start: 2022-02-23 | End: 2022-02-24

## 2022-02-23 RX ORDER — GLUCAGON INJECTION, SOLUTION 0.5 MG/.1ML
1 INJECTION, SOLUTION SUBCUTANEOUS ONCE
Refills: 0 | Status: DISCONTINUED | OUTPATIENT
Start: 2022-02-23 | End: 2022-02-24

## 2022-02-23 RX ORDER — ASCORBIC ACID 60 MG
500 TABLET,CHEWABLE ORAL DAILY
Refills: 0 | Status: DISCONTINUED | OUTPATIENT
Start: 2022-02-23 | End: 2022-02-24

## 2022-02-23 RX ORDER — LANOLIN ALCOHOL/MO/W.PET/CERES
3 CREAM (GRAM) TOPICAL AT BEDTIME
Refills: 0 | Status: DISCONTINUED | OUTPATIENT
Start: 2022-02-23 | End: 2022-02-24

## 2022-02-23 RX ORDER — TRAMADOL HYDROCHLORIDE 50 MG/1
25 TABLET ORAL EVERY 4 HOURS
Refills: 0 | Status: DISCONTINUED | OUTPATIENT
Start: 2022-02-23 | End: 2022-02-24

## 2022-02-23 RX ORDER — KETOROLAC TROMETHAMINE 30 MG/ML
15 SYRINGE (ML) INJECTION ONCE
Refills: 0 | Status: DISCONTINUED | OUTPATIENT
Start: 2022-02-23 | End: 2022-02-23

## 2022-02-23 RX ORDER — MORPHINE SULFATE 50 MG/1
2 CAPSULE, EXTENDED RELEASE ORAL EVERY 4 HOURS
Refills: 0 | Status: DISCONTINUED | OUTPATIENT
Start: 2022-02-23 | End: 2022-02-24

## 2022-02-23 RX ORDER — DEXTROSE 50 % IN WATER 50 %
25 SYRINGE (ML) INTRAVENOUS ONCE
Refills: 0 | Status: DISCONTINUED | OUTPATIENT
Start: 2022-02-23 | End: 2022-02-24

## 2022-02-23 RX ORDER — HYDRALAZINE HCL 50 MG
25 TABLET ORAL EVERY 12 HOURS
Refills: 0 | Status: DISCONTINUED | OUTPATIENT
Start: 2022-02-23 | End: 2022-02-24

## 2022-02-23 RX ORDER — FERROUS SULFATE 325(65) MG
325 TABLET ORAL DAILY
Refills: 0 | Status: DISCONTINUED | OUTPATIENT
Start: 2022-02-23 | End: 2022-02-24

## 2022-02-23 RX ORDER — HYDROXYCHLOROQUINE SULFATE 200 MG
200 TABLET ORAL
Refills: 0 | Status: DISCONTINUED | OUTPATIENT
Start: 2022-02-23 | End: 2022-02-24

## 2022-02-23 RX ORDER — CHOLECALCIFEROL (VITAMIN D3) 125 MCG
1000 CAPSULE ORAL DAILY
Refills: 0 | Status: DISCONTINUED | OUTPATIENT
Start: 2022-02-23 | End: 2022-02-24

## 2022-02-23 RX ORDER — ENOXAPARIN SODIUM 100 MG/ML
40 INJECTION SUBCUTANEOUS DAILY
Refills: 0 | Status: DISCONTINUED | OUTPATIENT
Start: 2022-02-23 | End: 2022-02-24

## 2022-02-23 RX ORDER — ATORVASTATIN CALCIUM 80 MG/1
40 TABLET, FILM COATED ORAL AT BEDTIME
Refills: 0 | Status: DISCONTINUED | OUTPATIENT
Start: 2022-02-23 | End: 2022-02-24

## 2022-02-23 RX ORDER — INSULIN GLARGINE 100 [IU]/ML
10 INJECTION, SOLUTION SUBCUTANEOUS AT BEDTIME
Refills: 0 | Status: DISCONTINUED | OUTPATIENT
Start: 2022-02-23 | End: 2022-02-24

## 2022-02-23 RX ORDER — SULFASALAZINE 500 MG
1000 TABLET ORAL
Refills: 0 | Status: DISCONTINUED | OUTPATIENT
Start: 2022-02-23 | End: 2022-02-24

## 2022-02-23 RX ORDER — SODIUM CHLORIDE 9 MG/ML
1000 INJECTION, SOLUTION INTRAVENOUS
Refills: 0 | Status: DISCONTINUED | OUTPATIENT
Start: 2022-02-23 | End: 2022-02-24

## 2022-02-23 RX ORDER — ACETAMINOPHEN 500 MG
650 TABLET ORAL EVERY 6 HOURS
Refills: 0 | Status: DISCONTINUED | OUTPATIENT
Start: 2022-02-23 | End: 2022-02-24

## 2022-02-23 RX ORDER — DEXTROSE 50 % IN WATER 50 %
12.5 SYRINGE (ML) INTRAVENOUS ONCE
Refills: 0 | Status: DISCONTINUED | OUTPATIENT
Start: 2022-02-23 | End: 2022-02-24

## 2022-02-23 RX ORDER — FOLIC ACID 0.8 MG
1 TABLET ORAL DAILY
Refills: 0 | Status: DISCONTINUED | OUTPATIENT
Start: 2022-02-23 | End: 2022-02-24

## 2022-02-23 RX ADMIN — Medication 1000 MILLIGRAM(S): at 23:07

## 2022-02-23 RX ADMIN — Medication 25 MILLIGRAM(S): at 23:07

## 2022-02-23 RX ADMIN — INSULIN GLARGINE 10 UNIT(S): 100 INJECTION, SOLUTION SUBCUTANEOUS at 23:14

## 2022-02-23 RX ADMIN — ENOXAPARIN SODIUM 40 MILLIGRAM(S): 100 INJECTION SUBCUTANEOUS at 23:14

## 2022-02-23 RX ADMIN — Medication 1 TABLET(S): at 23:07

## 2022-02-23 RX ADMIN — Medication 15 MILLIGRAM(S): at 18:26

## 2022-02-23 RX ADMIN — Medication 200 MILLIGRAM(S): at 23:07

## 2022-02-23 RX ADMIN — PROPOFOL 60 MILLIGRAM(S): 10 INJECTION, EMULSION INTRAVENOUS at 17:41

## 2022-02-23 RX ADMIN — PROPOFOL 10 MILLIGRAM(S): 10 INJECTION, EMULSION INTRAVENOUS at 17:44

## 2022-02-23 RX ADMIN — ATORVASTATIN CALCIUM 40 MILLIGRAM(S): 80 TABLET, FILM COATED ORAL at 23:08

## 2022-02-23 RX ADMIN — Medication 15 MILLIGRAM(S): at 20:36

## 2022-02-23 RX ADMIN — PROPOFOL 10 MILLIGRAM(S): 10 INJECTION, EMULSION INTRAVENOUS at 17:42

## 2022-02-23 NOTE — H&P ADULT - NSHPLABSRESULTS_GEN_ALL_CORE
9.4    9.90  )-----------( 320      ( 23 Feb 2022 16:28 )             30.6       02-23    143  |  109<H>  |  14  ----------------------------<  149<H>  4.2   |  22  |  1.10    Ca    9.7      23 Feb 2022 16:28    TPro  7.2  /  Alb  3.3  /  TBili  0.2  /  DBili  x   /  AST  20  /  ALT  7<L>  /  AlkPhos  69  02-23                  PT/INR - ( 23 Feb 2022 16:28 )   PT: 14.0 sec;   INR: 1.17          PTT - ( 23 Feb 2022 16:28 )  PTT:25.7 sec    Lactate Trend            CAPILLARY BLOOD GLUCOSE

## 2022-02-23 NOTE — H&P ADULT - PROBLEM SELECTOR PLAN 7
Hx of lumbar spine degenerative joint disease on Morphine 30mg qd, although patient states she has not been taking it regularly since her recent hospitalization.  Follows with pain specialist, Dr. Pedroza.  - I-STOP  - monitor pain  - monitor for signs of withdrawal

## 2022-02-23 NOTE — ED ADULT TRIAGE NOTE - CHIEF COMPLAINT QUOTE
Pt had right hip fracture 3 week ago, was seen at [facility] Sat-Mon d/t dislocation post fracture. Pt reports being able to ambulate upon leaving [facility], reports worsening pain to right hip and numbness to right foot since yesterday, reports pain with weight bearing. No deformity noted as per EMS.

## 2022-02-23 NOTE — H&P ADULT - PROBLEM SELECTOR PLAN 3
Hx of DM on Januvia 50mg qd, Novolog 5u tid, Levemir 12u qhs, farxiga 5mg qd.  - mSSI  - 10u levemir and increase as tolerated

## 2022-02-23 NOTE — PATIENT PROFILE ADULT - FALL HARM RISK - HARM RISK INTERVENTIONS
Assistance with ambulation/Assistance OOB with selected safe patient handling equipment/Communicate Risk of Fall with Harm to all staff/Discuss with provider need for PT consult/Monitor gait and stability/Provide patient with walking aids - walker, cane, crutches/Reinforce activity limits and safety measures with patient and family/Sit up slowly, dangle for a short time, stand at bedside before walking/Tailored Fall Risk Interventions/Use of alarms - bed, chair and/or voice tab/Visual Cue: Yellow wristband and red socks/Bed in lowest position, wheels locked, appropriate side rails in place/Call bell, personal items and telephone in reach/Instruct patient to call for assistance before getting out of bed or chair/Non-slip footwear when patient is out of bed/[place] to call system/Physically safe environment - no spills, clutter or unnecessary equipment/Purposeful Proactive Rounding/Room/bathroom lighting operational, light cord in reach

## 2022-02-23 NOTE — PATIENT PROFILE ADULT - STATED REASON FOR ADMISSION
because I had numb feeling and pain to my foot swelling to my hip and knee found out my hip was dislocated

## 2022-02-23 NOTE — H&P ADULT - HISTORY OF PRESENT ILLNESS
72yo F with PMHx___ BIBA c/o pain while walking.  She had a mechanical fall with subsequent R hip fracture that surgically repaired at [facility].  Five days ago, she began experiencing new leg pain, went back to the hospital and was found to have the same hip dislocated and subsequently reduced.  Now she returns with pain again in the same leg and difficulty walking since yesterday.    While in the ED, ortho was called and reduced the hip and placed leg in knee immobilizer.    In the ED:  Initial vital signs: T: 99.3 F, HR: XX, BP: XX, R: XX, SpO2: XX% on RA  ED course:   Labs: significant for  Imaging:  CXR:   EKG:   Medications:   Consults: none  72yo F with PMHx___ BIBA c/o pain while walking.  She had a mechanical fall with subsequent R hip fracture that surgically repaired at [facility].  Five days ago, she began experiencing new leg pain, went back to the hospital and was found to have the same hip dislocated and subsequently reduced.  Now she returns with pain again in the same leg and difficulty walking since yesterday.    While in the ED, ortho was called and reduced the hip and placed leg in knee immobilizer.    In the ED:  Initial vital signs: T: 99.3 F, HR: 82, BP: 147/84, R: 17, SpO2: 97% on RA  ED course:   Labs: significant for Hgb 9.4, MCV 84.8,   Imaging:  CXR:   XR R hip: R hip dislocation.  XR R hip: Status post right hip arthroplasty reduction.  Osteopenia.  Large, calcified fibroid.  EKG:   Medications: toradol 15mg  Consults: ortho 70yo F with PMHx HTN, HLD, DM2, CKD4, RA, lumbar spine degenerative disease, osteopenia BIBA c/o R foot numbness.  She had a mechanical fall with subsequent R hip fracture that was surgically repaired at [facility] on 1/24/22.  She was discharged home and was pain free and able to walk for a couple of weeks.  SHe then began noticing that her R leg had significant pain and numbness.  SHe returned to [facility] and was found to have a R hip dislocation, which was reduced on Sun, 2/23.  She was discharged 1 day ago but noticed return of numbness to her R foot so she decided to come to [facility] for further evaluation.  While in the [facility] ED, she was found to have another R hip dislocation, which was subsequently reduced by orthopedic surgery.  She was in pain but the toradol helped and she is now pain free upon interview.  She denies any numbness/tingling as well.  She lives alone and is able to take care of herself.  She has been using a walker since her surgery but prior to this she was ambulating on her own without assistance devices.    In the ED:  Initial vital signs: T: 99.3 F, HR: 82, BP: 147/84, R: 17, SpO2: 97% on RA  ED course:   Labs: significant for Hgb 9.4, MCV 84.8,   Imaging:  CXR:   XR R hip: R hip dislocation.  XR R hip: Status post right hip arthroplasty reduction.  Osteopenia.  Large, calcified fibroid.  EKG:   Medications: toradol 15mg  Consults: ortho

## 2022-02-23 NOTE — PHYSICAL THERAPY INITIAL EVALUATION ADULT - ADDITIONAL COMMENTS
Pt lives in an elevator access apartment with no HHA services. Pt reports to ambulate with Rollator.

## 2022-02-23 NOTE — ED PROVIDER NOTE - PROGRESS NOTE DETAILS
[place]: Received s.o pending hip reduction. I was present throughout procedural sedation, monitoring airway/monitor. No complications. Ortho reduced hip. Evaluated by PT - pt unable to ambulate (in knee immobilizer). PA d/w Dr. Douglass earlier, will admit for further care. SW/CM aware.

## 2022-02-23 NOTE — H&P ADULT - NSHPPHYSICALEXAM_GEN_ALL_CORE
LOS:     VITALS:   T(C): 37.4 (02-23-22 @ 14:27), Max: 37.4 (02-23-22 @ 14:27)  HR: 81 (02-23-22 @ 18:15) (81 - 90)  BP: 181/82 (02-23-22 @ 18:15) (147/84 - 193/84)  RR: 13 (02-23-22 @ 18:15) (12 - 28)  SpO2: 99% (02-23-22 @ 18:15) (96% - 100%)    GENERAL: NAD, lying in bed comfortably  HEAD:  Atraumatic, Normocephalic  EYES: EOMI, PERRLA, conjunctiva and sclera clear  ENT: Moist mucous membranes  NECK: Supple, No JVD  CHEST/LUNG: Clear to auscultation bilaterally; No rales, rhonchi, wheezing, or rubs. Unlabored respirations  HEART: Regular rate and rhythm; No murmurs, rubs, or gallops  ABDOMEN: BSx4; Soft, nontender, nondistended  EXTREMITIES:  2+ Peripheral Pulses, brisk capillary refill. No clubbing, cyanosis, or edema  NERVOUS SYSTEM:  A&Ox3, no focal deficits   SKIN: No rashes or lesions LOS:     VITALS:   T(C): 37.4 (02-23-22 @ 14:27), Max: 37.4 (02-23-22 @ 14:27)  HR: 81 (02-23-22 @ 18:15) (81 - 90)  BP: 181/82 (02-23-22 @ 18:15) (147/84 - 193/84)  RR: 13 (02-23-22 @ 18:15) (12 - 28)  SpO2: 99% (02-23-22 @ 18:15) (96% - 100%)    GENERAL: NAD, lying in bed comfortably  HEAD:  Atraumatic, Normocephalic  EYES: EOMI, PERRLA, conjunctiva and sclera clear  ENT: Moist mucous membranes  NECK: Supple  CHEST/LUNG: Clear to auscultation bilaterally; No rales, rhonchi, wheezing, or rubs. Unlabored respirations  HEART: Regular rate and rhythm; No murmurs  ABDOMEN: BSx4; Soft, nontender, nondistended  EXTREMITIES:  2+ Peripheral Pulses, no edema, RLE in knee immobilizer.  Mild TTP R hip.    NERVOUS SYSTEM:  A&Ox3, sensory differences between RLE and LLE, unable to assess full muscle strength of RLE although able to wiggle toes.   SKIN: R hip with healing scar.

## 2022-02-23 NOTE — ED ADULT NURSE NOTE - CHIEF COMPLAINT QUOTE
Pt had right hip fracture 3 week ago, was seen at [facility] Sat-Mon d/t dislocation post fracture. Pt reports being able to ambulate upon leaving [facility], reports worsening pain to right hip and numbness to right foot since yesterday, reports pain with weight bearing. No deformity noted as per EMS.

## 2022-02-23 NOTE — ED PROVIDER NOTE - OBJECTIVE STATEMENT
The pt is a 72 y/o F, BIBA, c/o pain w/walking - states that had a mechanical fall few wks ago, sustained a hip fx - had total hip replacement at [facility], states that 5 d ago noticed her foot to be painful and went back - told that had a hip dislocation, had it put back and sent home, returns today saying pain w/walking since yest. States that refused to go back to [facility] because "they don't know what they're doing". Denies fall / injury, numbness or tingling to her toes, cp, sob, fevers, chills. Does not recall what she did prior to onset of pain

## 2022-02-23 NOTE — H&P ADULT - NSICDXPASTMEDICALHX_GEN_ALL_CORE_FT
PAST MEDICAL HISTORY:  Degenerative joint disease (DJD) of lumbar spine     DM (diabetes mellitus), type 2     HLD (hyperlipidemia)     HTN (hypertension)     Osteopenia     Rheumatoid arthritis     Stage 4 chronic kidney disease

## 2022-02-23 NOTE — ED PROVIDER NOTE - ATTENDING CONTRIBUTION TO CARE
72 y/o F, BIBA, c/o pain w/walking - states that had a mechanical fall few wks ago, sustained a hip fx - had total hip replacement at outside hospital and 5 days ago had a right hip dislocation that was reduced and pt was dcd home 2 days ago. Presents today to [facility] saying pain w/walking since yest. States that refused to go back to [facility] because "they don't know what they're doing". Denies fall / injury, numbness or tingling to her toes, cp, sob, fevers, chills. Does not recall what she did prior to onset of pain. Pt AAO, NAD. right hip shortened and rotated and pt unable to ROM right hip. XRs noted and pt with + right hip dislocation. Ortho consulted and in ED to see pt. Case endorsed to Dr. Contreras pending sedation and reduction of hip. Consents signed. Pt stable in ED.

## 2022-02-23 NOTE — H&P ADULT - PROBLEM SELECTOR PLAN 8
Hx of osteopenia, on Vit D.  Not taking calcium supplementation regularly.  - Vit D and calcium supplementation

## 2022-02-23 NOTE — H&P ADULT - ASSESSMENT
72yo F with PMHx HTN, HLD, DM2, CKD4, RA, lumbar spine degenerative disease, osteopenia s/p R hip arthroplasty presents c/o R foot numbness found to have R hip dislocation, now s/p reduction.

## 2022-02-23 NOTE — ED PROVIDER NOTE - CLINICAL SUMMARY MEDICAL DECISION MAKING FREE TEXT BOX
pt s/p total hip replacement at another [facility] few wks ago, states that dislocated it on 2/19 and had it put back at [facility], as of yest having pain and dif walking - requested to be brought to [facility] and refusing to go back to [facility], + hip dislocation - pt sedated and hip reduced by ortho, pt tolerated procedure well, pt wanting to be admitted - case management involved - PT to eval for dc safety vs need for SULMA

## 2022-02-23 NOTE — ED ADULT NURSE NOTE - OBJECTIVE STATEMENT
71y F, with PMHx of HTN, DM, Arthritis, RTHR after s/p Fall 3 weeks ago, seen again on 2/19 at neighboring hospital for R hip dislocation, d/c 2/21, presents to the ED with R hip/foot pain and numbness to R foot since yesterday. RLE noted shorter than LLE. Denies SOB, CP. Unable to ambulate, 2/2 to pain. Patient stated she took Morphine 15mg PO, yesterday for pain relief. 71y F, with PMHx of HTN, DM, Arthritis, RTHR after s/p Fall 3 weeks ago, seen again on 2/19 at neighboring hospital for R hip dislocation, d/c 2/21, presents to the ED with R hip/foot pain and numbness to R foot since yesterday. RLE noted shorter than LLE. Denies LOC, SOB, CP, dizziness, fever. Unable to ambulate, 2/2 to pain. Patient stated she took Morphine 15mg PO, yesterday for pain relief.

## 2022-02-23 NOTE — ED PROVIDER NOTE - CARE PROVIDER_API CALL
Leonard Almanzar)  Orthopedics  [address] [place] [address]  Phone: [phone]  Fax: [phone]  Follow Up Time:     Darwin Arguello)  Orthopaedic Surgery Surgery  [address] [address]  Phone: [phone]  Fax: [phone]  Follow Up Time:

## 2022-02-23 NOTE — ED PROVIDER NOTE - MUSCULOSKELETAL, MLM
Spine appears normal, range of motion is not limited, no muscle or joint tenderness; R LE: shortened, unable to range the hip, + light touch, pedal pulses 2+ b/l, no tend over knee or ankle

## 2022-02-23 NOTE — PHYSICAL THERAPY INITIAL EVALUATION ADULT - BALANCE TRAINING, PT EVAL
Pt. will be able to don/doff socks 4/4 w/o LOB in sitting  Pt. will be able to wear jacket/shirt 4/4 w/o LOB in standing

## 2022-02-23 NOTE — H&P ADULT - PROBLEM SELECTOR PLAN 1
s/p total R hip arthroplasty on 1/24/22 at [facility].  Now with 2nd dislocation of R hip after surgery.  reduced by orthopedic surgery in ED and in R knee immobilizer.  - f/u ortho recs  - f/u PT  - pain control:  tylenol 650mg q6h prn mild  toradol 25mg q4h prn mod  morphine 2mg IVP prn sev s/p total R hip arthroplasty on 1/24/22 at [facility] [place].  Now with 2nd dislocation of R hip after surgery.  reduced by orthopedic surgery in ED and in R knee immobilizer.  - f/u ortho recs  - f/u PT  - pain control:  tylenol 650mg PO q6h prn mild  toradol 25mg PO q4h prn mod  morphine 2mg IVP q4h prn sev

## 2022-02-23 NOTE — ED PROCEDURE NOTE - ATTENDING CONTRIBUTION TO CARE
I was present during procedural sedation. time out prior to procedure. connected to monitor/end tidal. Not hypoxic or hypercapnic during procedure. back to baseline post-procedure.

## 2022-02-23 NOTE — PHYSICAL THERAPY INITIAL EVALUATION ADULT - PERTINENT HX OF CURRENT PROBLEM, REHAB EVAL
The pt is a 70 y/o F, BIBA, c/o pain w/walking - states that had a mechanical fall few wks ago, sustained a hip fx - had total hip replacement at [facility], states that 5 d ago noticed her foot to be painful and went back - told that had a hip dislocation, had it put back and sent home, returns today saying pain w/walking since yest.

## 2022-02-23 NOTE — H&P ADULT - NSHPSOCIALHISTORY_GEN_ALL_CORE
Occupation:  Living situation:  Tobacco:  Alcohol:   Illicit drug use: Occupation: retired nurse  Living situation: alone  Tobacco: remote  Alcohol: occasional  Illicit drug use: never

## 2022-02-24 ENCOUNTER — TRANSCRIPTION ENCOUNTER (OUTPATIENT)
Age: 72
End: 2022-02-24

## 2022-02-24 VITALS
HEART RATE: 83 BPM | OXYGEN SATURATION: 94 % | DIASTOLIC BLOOD PRESSURE: 74 MMHG | SYSTOLIC BLOOD PRESSURE: 124 MMHG | RESPIRATION RATE: 18 BRPM | TEMPERATURE: 99 F

## 2022-02-24 LAB
A1C WITH ESTIMATED AVERAGE GLUCOSE RESULT: 7.4 % — HIGH (ref 4–5.6)
ALBUMIN SERPL ELPH-MCNC: 3.2 G/DL — LOW (ref 3.3–5)
ALP SERPL-CCNC: 70 U/L — SIGNIFICANT CHANGE UP (ref 40–120)
ALT FLD-CCNC: 9 U/L — LOW (ref 10–45)
ANION GAP SERPL CALC-SCNC: 11 MMOL/L — SIGNIFICANT CHANGE UP (ref 5–17)
APTT BLD: 32.2 SEC — SIGNIFICANT CHANGE UP (ref 27.5–35.5)
AST SERPL-CCNC: 26 U/L — SIGNIFICANT CHANGE UP (ref 10–40)
BASOPHILS # BLD AUTO: 0.05 K/UL — SIGNIFICANT CHANGE UP (ref 0–0.2)
BASOPHILS NFR BLD AUTO: 0.5 % — SIGNIFICANT CHANGE UP (ref 0–2)
BILIRUB SERPL-MCNC: 0.2 MG/DL — SIGNIFICANT CHANGE UP (ref 0.2–1.2)
BLD GP AB SCN SERPL QL: NEGATIVE — SIGNIFICANT CHANGE UP
BUN SERPL-MCNC: 19 MG/DL — SIGNIFICANT CHANGE UP (ref 7–23)
CALCIUM SERPL-MCNC: 9.4 MG/DL — SIGNIFICANT CHANGE UP (ref 8.4–10.5)
CHLORIDE SERPL-SCNC: 108 MMOL/L — SIGNIFICANT CHANGE UP (ref 96–108)
CO2 SERPL-SCNC: 24 MMOL/L — SIGNIFICANT CHANGE UP (ref 22–31)
CREAT SERPL-MCNC: 1.14 MG/DL — SIGNIFICANT CHANGE UP (ref 0.5–1.3)
EOSINOPHIL # BLD AUTO: 0.29 K/UL — SIGNIFICANT CHANGE UP (ref 0–0.5)
EOSINOPHIL NFR BLD AUTO: 2.9 % — SIGNIFICANT CHANGE UP (ref 0–6)
ESTIMATED AVERAGE GLUCOSE: 166 MG/DL — HIGH (ref 68–114)
FOLATE SERPL-MCNC: >20 NG/ML — SIGNIFICANT CHANGE UP
GLUCOSE BLDC GLUCOMTR-MCNC: 132 MG/DL — HIGH (ref 70–99)
GLUCOSE BLDC GLUCOMTR-MCNC: 144 MG/DL — HIGH (ref 70–99)
GLUCOSE BLDC GLUCOMTR-MCNC: 152 MG/DL — HIGH (ref 70–99)
GLUCOSE BLDC GLUCOMTR-MCNC: 240 MG/DL — HIGH (ref 70–99)
GLUCOSE SERPL-MCNC: 154 MG/DL — HIGH (ref 70–99)
HCT VFR BLD CALC: 30.2 % — LOW (ref 34.5–45)
HCV AB S/CO SERPL IA: 142.8 S/CO — HIGH
HCV AB SERPL-IMP: REACTIVE
HGB BLD-MCNC: 9.3 G/DL — LOW (ref 11.5–15.5)
IMM GRANULOCYTES NFR BLD AUTO: 1.3 % — SIGNIFICANT CHANGE UP (ref 0–1.5)
INR BLD: 1.1 — SIGNIFICANT CHANGE UP (ref 0.88–1.16)
LYMPHOCYTES # BLD AUTO: 1.55 K/UL — SIGNIFICANT CHANGE UP (ref 1–3.3)
LYMPHOCYTES # BLD AUTO: 15.6 % — SIGNIFICANT CHANGE UP (ref 13–44)
MAGNESIUM SERPL-MCNC: 1.6 MG/DL — SIGNIFICANT CHANGE UP (ref 1.6–2.6)
MCHC RBC-ENTMCNC: 26.5 PG — LOW (ref 27–34)
MCHC RBC-ENTMCNC: 30.8 GM/DL — LOW (ref 32–36)
MCV RBC AUTO: 86 FL — SIGNIFICANT CHANGE UP (ref 80–100)
MONOCYTES # BLD AUTO: 0.84 K/UL — SIGNIFICANT CHANGE UP (ref 0–0.9)
MONOCYTES NFR BLD AUTO: 8.5 % — SIGNIFICANT CHANGE UP (ref 2–14)
NEUTROPHILS # BLD AUTO: 7.08 K/UL — SIGNIFICANT CHANGE UP (ref 1.8–7.4)
NEUTROPHILS NFR BLD AUTO: 71.2 % — SIGNIFICANT CHANGE UP (ref 43–77)
NRBC # BLD: 0 /100 WBCS — SIGNIFICANT CHANGE UP (ref 0–0)
PHOSPHATE SERPL-MCNC: 2.5 MG/DL — SIGNIFICANT CHANGE UP (ref 2.5–4.5)
PLATELET # BLD AUTO: 293 K/UL — SIGNIFICANT CHANGE UP (ref 150–400)
POTASSIUM SERPL-MCNC: 4 MMOL/L — SIGNIFICANT CHANGE UP (ref 3.5–5.3)
POTASSIUM SERPL-SCNC: 4 MMOL/L — SIGNIFICANT CHANGE UP (ref 3.5–5.3)
PROT SERPL-MCNC: 6.8 G/DL — SIGNIFICANT CHANGE UP (ref 6–8.3)
PROTHROM AB SERPL-ACNC: 13.1 SEC — SIGNIFICANT CHANGE UP (ref 10.5–13.4)
RBC # BLD: 3.51 M/UL — LOW (ref 3.8–5.2)
RBC # FLD: 15.4 % — HIGH (ref 10.3–14.5)
RH IG SCN BLD-IMP: POSITIVE — SIGNIFICANT CHANGE UP
SODIUM SERPL-SCNC: 143 MMOL/L — SIGNIFICANT CHANGE UP (ref 135–145)
TSH SERPL-MCNC: 0.72 UIU/ML — SIGNIFICANT CHANGE UP (ref 0.27–4.2)
VIT B12 SERPL-MCNC: 1831 PG/ML — HIGH (ref 232–1245)
WBC # BLD: 9.94 K/UL — SIGNIFICANT CHANGE UP (ref 3.8–10.5)
WBC # FLD AUTO: 9.94 K/UL — SIGNIFICANT CHANGE UP (ref 3.8–10.5)

## 2022-02-24 PROCEDURE — 86901 BLOOD TYPING SEROLOGIC RH(D): CPT

## 2022-02-24 PROCEDURE — U0005: CPT

## 2022-02-24 PROCEDURE — 82962 GLUCOSE BLOOD TEST: CPT

## 2022-02-24 PROCEDURE — 80053 COMPREHEN METABOLIC PANEL: CPT

## 2022-02-24 PROCEDURE — 96375 TX/PRO/DX INJ NEW DRUG ADDON: CPT

## 2022-02-24 PROCEDURE — 36415 COLL VENOUS BLD VENIPUNCTURE: CPT

## 2022-02-24 PROCEDURE — 84443 ASSAY THYROID STIM HORMONE: CPT

## 2022-02-24 PROCEDURE — 73502 X-RAY EXAM HIP UNI 2-3 VIEWS: CPT

## 2022-02-24 PROCEDURE — 86803 HEPATITIS C AB TEST: CPT

## 2022-02-24 PROCEDURE — 85610 PROTHROMBIN TIME: CPT

## 2022-02-24 PROCEDURE — 96374 THER/PROPH/DIAG INJ IV PUSH: CPT

## 2022-02-24 PROCEDURE — 84100 ASSAY OF PHOSPHORUS: CPT

## 2022-02-24 PROCEDURE — 99233 SBSQ HOSP IP/OBS HIGH 50: CPT | Mod: GC

## 2022-02-24 PROCEDURE — 86900 BLOOD TYPING SEROLOGIC ABO: CPT

## 2022-02-24 PROCEDURE — 85025 COMPLETE CBC W/AUTO DIFF WBC: CPT

## 2022-02-24 PROCEDURE — U0003: CPT

## 2022-02-24 PROCEDURE — 86850 RBC ANTIBODY SCREEN: CPT

## 2022-02-24 PROCEDURE — 99285 EMERGENCY DEPT VISIT HI MDM: CPT

## 2022-02-24 PROCEDURE — 99156 MOD SED OTH PHYS/QHP 5/>YRS: CPT

## 2022-02-24 PROCEDURE — 82746 ASSAY OF FOLIC ACID SERUM: CPT

## 2022-02-24 PROCEDURE — 97161 PT EVAL LOW COMPLEX 20 MIN: CPT

## 2022-02-24 PROCEDURE — 83036 HEMOGLOBIN GLYCOSYLATED A1C: CPT

## 2022-02-24 PROCEDURE — 82607 VITAMIN B-12: CPT

## 2022-02-24 PROCEDURE — 83735 ASSAY OF MAGNESIUM: CPT

## 2022-02-24 PROCEDURE — 85730 THROMBOPLASTIN TIME PARTIAL: CPT

## 2022-02-24 PROCEDURE — 87521 HEPATITIS C PROBE&RVRS TRNSC: CPT

## 2022-02-24 RX ORDER — AMLODIPINE BESYLATE 2.5 MG/1
10 TABLET ORAL EVERY 24 HOURS
Refills: 0 | Status: DISCONTINUED | OUTPATIENT
Start: 2022-02-24 | End: 2022-02-24

## 2022-02-24 RX ORDER — MAGNESIUM SULFATE 500 MG/ML
2 VIAL (ML) INJECTION ONCE
Refills: 0 | Status: COMPLETED | OUTPATIENT
Start: 2022-02-24 | End: 2022-02-24

## 2022-02-24 RX ORDER — CALCIUM CARBONATE 500(1250)
1 TABLET ORAL
Qty: 0 | Refills: 0 | DISCHARGE
Start: 2022-02-24

## 2022-02-24 RX ORDER — OXYCODONE HYDROCHLORIDE 5 MG/1
5 TABLET ORAL EVERY 4 HOURS
Refills: 0 | Status: DISCONTINUED | OUTPATIENT
Start: 2022-02-24 | End: 2022-02-24

## 2022-02-24 RX ORDER — OXYCODONE HYDROCHLORIDE 5 MG/1
1 TABLET ORAL
Qty: 0 | Refills: 0 | DISCHARGE
Start: 2022-02-24

## 2022-02-24 RX ORDER — MORPHINE SULFATE 50 MG/1
1 CAPSULE, EXTENDED RELEASE ORAL
Qty: 0 | Refills: 0 | DISCHARGE

## 2022-02-24 RX ADMIN — Medication 1 TABLET(S): at 10:00

## 2022-02-24 RX ADMIN — Medication 81 MILLIGRAM(S): at 12:00

## 2022-02-24 RX ADMIN — Medication 1000 MILLIGRAM(S): at 10:00

## 2022-02-24 RX ADMIN — AMLODIPINE BESYLATE 10 MILLIGRAM(S): 2.5 TABLET ORAL at 12:00

## 2022-02-24 RX ADMIN — Medication 325 MILLIGRAM(S): at 12:00

## 2022-02-24 RX ADMIN — Medication 1000 UNIT(S): at 12:00

## 2022-02-24 RX ADMIN — ENOXAPARIN SODIUM 40 MILLIGRAM(S): 100 INJECTION SUBCUTANEOUS at 12:00

## 2022-02-24 RX ADMIN — PREGABALIN 1000 MICROGRAM(S): 225 CAPSULE ORAL at 12:00

## 2022-02-24 RX ADMIN — Medication 1 TABLET(S): at 12:00

## 2022-02-24 RX ADMIN — Medication 25 GRAM(S): at 11:14

## 2022-02-24 RX ADMIN — MORPHINE SULFATE 2 MILLIGRAM(S): 50 CAPSULE, EXTENDED RELEASE ORAL at 01:11

## 2022-02-24 RX ADMIN — Medication 200 MILLIGRAM(S): at 10:00

## 2022-02-24 RX ADMIN — OXYCODONE HYDROCHLORIDE 5 MILLIGRAM(S): 5 TABLET ORAL at 09:49

## 2022-02-24 RX ADMIN — OXYCODONE HYDROCHLORIDE 5 MILLIGRAM(S): 5 TABLET ORAL at 10:49

## 2022-02-24 RX ADMIN — Medication 1 MILLIGRAM(S): at 12:00

## 2022-02-24 RX ADMIN — Medication 25 MILLIGRAM(S): at 10:00

## 2022-02-24 RX ADMIN — Medication 4: at 11:32

## 2022-02-24 RX ADMIN — Medication 2: at 16:45

## 2022-02-24 RX ADMIN — Medication 500 MILLIGRAM(S): at 12:00

## 2022-02-24 RX ADMIN — MORPHINE SULFATE 2 MILLIGRAM(S): 50 CAPSULE, EXTENDED RELEASE ORAL at 00:47

## 2022-02-24 NOTE — DISCHARGE NOTE PROVIDER - NSDCFUADDAPPT_GEN_ALL_CORE_FT
You have appointments scheduled with your cardiologist and rheumatologist.  Please followup.     You have also been provided contacts of joints surgeon Dr Vandana Arguello and Dr Leonard Almanzar for followup for care.  Please followup with either of them within two weeks.

## 2022-02-24 NOTE — PROGRESS NOTE ADULT - PROBLEM SELECTOR PLAN 2
Hx of HTN on hydralazine 25mg bid, amlodipine 10mg qd, metoprolol succinate 100mg qd, losartan 100mg qd, spironolactone 25mg qd.    - start hydral 25mg qd  - restart other meds accordingly

## 2022-02-24 NOTE — DISCHARGE NOTE PROVIDER - NSDCMRMEDTOKEN_GEN_ALL_CORE_FT
amLODIPine 10 mg oral tablet: 1 tab(s) orally once a day  aspirin 81 mg oral tablet, chewable: 1 tab(s) orally once a day  Farxiga 5 mg oral tablet: 1 tab(s) orally once a day  ferrous sulfate 324 mg (65 mg elemental iron) oral delayed release tablet: 1 tab(s) orally once a day  folic acid 1 mg oral tablet: 1 tab(s) orally once a day  hydrALAZINE 25 mg oral tablet: 1 tab(s) orally 2 times a day  hydroxychloroquine 200 mg oral tablet: 1 tab(s) orally 2 times a day  Januvia 50 mg oral tablet: 1 tab(s) orally once a day  Levemir 100 units/mL subcutaneous solution: 12 unit(s) subcutaneous once a day (at bedtime)  Lipitor 40 mg oral tablet: 1 tab(s) orally once a day  losartan 100 mg oral tablet: 1 tab(s) orally once a day  metoprolol succinate 100 mg oral tablet, extended release: 1 tab(s) orally once a day  morphine 30 mg/24 hours oral capsule, extended release: 1 cap(s) orally once a day  Multiple Vitamins oral capsule: 1 cap(s) orally once a day  NovoLOG 100 units/mL injectable solution: 5 unit(s) injectable 3 times a day  spironolactone 25 mg oral tablet: 1 tab(s) orally once a day  sulfaSALAzine 500 mg oral tablet: 2 tab(s) orally 2 times a day  Tylenol 500 mg oral tablet: 1 tab(s) orally every 6 hours, As Needed  Vitamin B12 1000 mcg oral tablet: 1 tab(s) orally once a day  Vitamin C 500 mg oral tablet: 1 tab(s) orally once a day  Vitamin D3 25 mcg (1000 intl units) oral tablet: 1 tab(s) orally once a day   amLODIPine 10 mg oral tablet: 1 tab(s) orally once a day  aspirin 81 mg oral tablet, chewable: 1 tab(s) orally once a day  calcium carbonate 500 mg (200 mg elemental calcium) oral tablet, chewable: 1 tab(s) orally 2 times a day  Farxiga 5 mg oral tablet: 1 tab(s) orally once a day  ferrous sulfate 324 mg (65 mg elemental iron) oral delayed release tablet: 1 tab(s) orally once a day  folic acid 1 mg oral tablet: 1 tab(s) orally once a day  hydrALAZINE 25 mg oral tablet: 1 tab(s) orally 2 times a day  hydroxychloroquine 200 mg oral tablet: 1 tab(s) orally 2 times a day  Januvia 50 mg oral tablet: 1 tab(s) orally once a day  Levemir 100 units/mL subcutaneous solution: 12 unit(s) subcutaneous once a day (at bedtime)  Lipitor 40 mg oral tablet: 1 tab(s) orally once a day  losartan 100 mg oral tablet: 1 tab(s) orally once a day  metoprolol succinate 100 mg oral tablet, extended release: 1 tab(s) orally once a day  Multiple Vitamins oral capsule: 1 cap(s) orally once a day  NovoLOG 100 units/mL injectable solution: 5 unit(s) injectable 3 times a day  Oxaydo 5 mg oral tablet: 1 tab(s) orally every 4 hours, As needed, Moderate Pain (4 - 6)  spironolactone 25 mg oral tablet: 1 tab(s) orally once a day  sulfaSALAzine 500 mg oral tablet: 2 tab(s) orally 2 times a day  Tylenol 500 mg oral tablet: 1 tab(s) orally every 6 hours, As Needed  Vitamin B12 1000 mcg oral tablet: 1 tab(s) orally once a day  Vitamin C 500 mg oral tablet: 1 tab(s) orally once a day  Vitamin D3 25 mcg (1000 intl units) oral tablet: 1 tab(s) orally once a day

## 2022-02-24 NOTE — PROGRESS NOTE ADULT - TIME BILLING
Patient seen and examined;  Agrees to SULMA  Need to restart her antihypertensive medication  Also Rheum to follow up

## 2022-02-24 NOTE — PROGRESS NOTE ADULT - ASSESSMENT
70yo F with PMHx HTN, HLD, DM2, CKD4, RA, lumbar spine degenerative disease, osteopenia s/p R hip arthroplasty presents c/o R foot numbness found to have Right hip prosthesis dislocation s/p Total hip Arthroplasty, now s/p reduction.

## 2022-02-24 NOTE — DISCHARGE NOTE PROVIDER - PROVIDER TOKENS
PROVIDER:[TOKEN:[[id number]],FOLLOWUP:[2 weeks]],PROVIDER:[TOKEN:[[id number]],FOLLOWUP:[2 weeks]]

## 2022-02-24 NOTE — PROGRESS NOTE ADULT - PROBLEM SELECTOR PLAN 1
Patient s/p total R hip arthroplasty on 1/24/22 at [facility].  Now with 2nd dislocation of R hip after surgery, successfully Closed Reduction under conscious sedation in ED by orthopedic surgery in ED and in R knee immobilizer, post reduction films show hip is located.  - f/u ortho recs  - f/u PT recs  - pt provided contacts of joints surgeon Dr Vandana Arguello and Dr Leonard Almanzar for f/u of ortho care  - pain control:  tylenol 650mg PO q6h prn mild  toradol 25mg PO q4h prn mod  morphine 2mg IVP q4h prn sev        A/P: 71y Female with Right hip prosthesis dislocation s/p Total hip Arthroplasty**  Procedure: Closed Reduction under conscious sedation in ED    -  -Post reduction exam unchanged, +EHL FHL TA GS  -  - pt admitted to medicine for rehab placement    -Discussed with Attending Dr. Ladd Patient s/p total R hip arthroplasty on 1/24/22 at [facility].  Now with 2nd dislocation of R hip after surgery, successfully Closed Reduction under conscious sedation in ED by orthopedic surgery in ED and in R knee immobilizer, post reduction films show hip is located.  - f/u ortho recs  - PT recommends SULMA  - pt provided contacts of joints surgeon Dr Vandana Arguello and Dr Leonard Almanzar for f/u of ortho care  - pain control:  tylenol 650mg PO q6h prn mild  toradol 25mg PO q4h prn mod  morphine 2mg IVP q4h prn sev        A/P: 71y Female with Right hip prosthesis dislocation s/p Total hip Arthroplasty**  Procedure: Closed Reduction under conscious sedation in ED    -  -Post reduction exam unchanged, +EHL FHL TA GS  -  - pt admitted to medicine for rehab placement    -Discussed with Attending Dr. Ladd Patient s/p total R hip arthroplasty on 1/24/22 at [facility].  Now with 2nd dislocation of R hip after surgery, successfully Closed Reduction under conscious sedation in ED by orthopedic surgery in ED and in R knee immobilizer, post reduction films show hip is located.  - f/u ortho recs  - PT recommends SULMA  - pt provided contacts of joints surgeon Dr Vandana Arguello and Dr Leonard Almanzar for f/u of ortho care  - pain control:  tylenol 650mg PO q6h prn mild  toradol 25mg PO q4h prn mod  morphine 2mg IVP q4h prn sev

## 2022-02-24 NOTE — DISCHARGE NOTE NURSING/CASE MANAGEMENT/SOCIAL WORK - NSDCPEFALRISK_GEN_ALL_CORE
For information on Fall & Injury Prevention, visit: [email or web address] OR  [email or web address] OR  https://www.cdc.gov/steadi/patient.html

## 2022-02-24 NOTE — OCCUPATIONAL THERAPY INITIAL EVALUATION ADULT - RANGE OF MOTION EXAMINATION, LOWER EXTREMITY
R ankle WFL ( Hip/knee not assessed 2/2 in KI)/Left LE Active ROM was WFL (within functional limits)

## 2022-02-24 NOTE — DISCHARGE NOTE PROVIDER - NSDCCPCAREPLAN_GEN_ALL_CORE_FT
PRINCIPAL DISCHARGE DIAGNOSIS  Diagnosis: Dislocation of right hip  Assessment and Plan of Treatment: Patient s/p total R hip arthroplasty on 1/24/22 at [facility].  Now with 2nd dislocation of R hip after surgery, successfully Closed Reduction under conscious sedation in ED by orthopedic surgery in ED and in R knee immobilizer, post reduction films show hip is located.  *** Physical therapy evaluated patient and recommended  SULMA  *** Per Ortho, WBAT, posterior and anterior hip precautions  *** pt provided contacts of joints surgeon Dr Vandana Arguello and Dr Leonard Almanzar for f/u of ortho care  *** pain control:  tylenol 650mg PO q6h prn mild  toradol 25mg PO q4h prn mod  morphine 2mg IVP q4h prn sev.      SECONDARY DISCHARGE DIAGNOSES  Diagnosis: Rheumatoid arthritis  Assessment and Plan of Treatment: Patient has history of RA on Hydroxychloroquine 200mg bid and sulfasalazine 1000mg bid.  - Continue home medication    Diagnosis: DM (diabetes mellitus), type 2  Assessment and Plan of Treatment: Patient has history of DM on Januvia 50mg qd, Novolog 5u tid, Levemir 12u qhs, farxiga 5mg qd.  - Continue home medication    Diagnosis: HLD (hyperlipidemia)  Assessment and Plan of Treatment: Patient has history of HLD on lipitor 40mg qd.  - Continue home medication    Diagnosis: Stage 4 chronic kidney disease  Assessment and Plan of Treatment: Patient has history of CKD 4 as per chart review.  Cr upon admission 1.10. Patient clinically euvolemic on exam.  - Followup with Nephrologist    Diagnosis: HTN (hypertension)  Assessment and Plan of Treatment: History of HTN on hydralazine 25mg bid, amlodipine 10mg qd, metoprolol succinate 100mg qd, losartan 100mg qd, spironolactone 25mg qd.    - Continue home medication    Diagnosis: Degenerative joint disease (DJD) of lumbar spine  Assessment and Plan of Treatment: Hx of lumbar spine degenerative joint disease on Morphine 30mg qd, although patient states she has not been taking it regularly since her recent hospitalization.  Follows with pain specialist, Dr. Pedroza.  - Followup with PCP or pain management doctor        PRINCIPAL DISCHARGE DIAGNOSIS  Diagnosis: Dislocation of right hip  Assessment and Plan of Treatment: Patient s/p total R hip arthroplasty on 1/24/22 at [facility].  Now with 2nd dislocation of R hip after surgery, successfully Closed Reduction under conscious sedation in ED by orthopedic surgery in ED and in R knee immobilizer, post reduction films show hip is located.  *** Physical therapy evaluated patient and recommended  SULMA  *** Per Ortho, WBAT, posterior and anterior hip precautions  *** pt provided contacts of joints surgeon Dr Vandana Arguello and Dr Leonard Almanzar for f/u of ortho care  *** pain control:  tylenol 650mg PO q6h prn mild  Oxycodone 50mg PO q4h prn mod  morphine 2mg IVP q4h prn sev.      SECONDARY DISCHARGE DIAGNOSES  Diagnosis: HTN (hypertension)  Assessment and Plan of Treatment: History of HTN on hydralazine 25mg bid, amlodipine 10mg qd, metoprolol succinate 100mg qd, losartan 100mg qd, spironolactone 25mg qd.    - Continue home medication    Diagnosis: Rheumatoid arthritis  Assessment and Plan of Treatment: Patient has history of RA on Hydroxychloroquine 200mg bid and sulfasalazine 1000mg bid.  - Continue home medication    Diagnosis: DM (diabetes mellitus), type 2  Assessment and Plan of Treatment: Patient has history of DM on Januvia 50mg qd, Novolog 5u tid, Levemir 12u qhs, farxiga 5mg qd.  - Continue home medication    Diagnosis: HLD (hyperlipidemia)  Assessment and Plan of Treatment: Patient has history of HLD on lipitor 40mg qd.  - Continue home medication    Diagnosis: Stage 4 chronic kidney disease  Assessment and Plan of Treatment: Patient has history of CKD 4 as per chart review.  Cr upon admission 1.10. Patient clinically euvolemic on exam.  - Followup with Nephrologist    Diagnosis: Degenerative joint disease (DJD) of lumbar spine  Assessment and Plan of Treatment: Hx of lumbar spine degenerative joint disease on Morphine 30mg qd, although patient states she has not been taking it regularly since her recent hospitalization.  Follows with pain specialist, Dr. Pedroza.  - Followup with PCP or pain management doctor

## 2022-02-24 NOTE — CONSULT NOTE ADULT - SUBJECTIVE AND OBJECTIVE BOX
71y Female community ambulator presents c/o R hip dislocation. Denies HS/LOC. Denies numbness/tingling. Denies fever/chills. Denies pain/injury elsewhere.     No pertinent family history in first degree relatives    Handoff    MEWS Score    HTN (hypertension)    HLD (hyperlipidemia)    DM (diabetes mellitus), type 2    Rheumatoid arthritis    Stage 4 chronic kidney disease    Degenerative joint disease (DJD) of lumbar spine    Osteopenia    Dislocation of right hip    Hip dislocation, right    HTN (hypertension)    HLD (hyperlipidemia)    DM (diabetes mellitus), type 2    Rheumatoid arthritis    Stage 4 chronic kidney disease    Degenerative joint disease (DJD) of lumbar spine    Osteopenia    Nutrition, metabolism, and development symptoms    S/P total right hip arthroplasty    HIP PAIN    HTN (hypertension)      PAST MEDICAL & SURGICAL HISTORY:  HTN (hypertension)    HLD (hyperlipidemia)    DM (diabetes mellitus), type 2    Rheumatoid arthritis    Stage 4 chronic kidney disease    Degenerative joint disease (DJD) of lumbar spine    Osteopenia    S/P total right hip arthroplasty      MEDICATIONS  (STANDING):  ascorbic acid 500 milliGRAM(s) Oral daily  aspirin  chewable 81 milliGRAM(s) Oral daily  atorvastatin 40 milliGRAM(s) Oral at bedtime  calcium carbonate    500 mG (Tums) Chewable 1 Tablet(s) Chew two times a day  cholecalciferol 1000 Unit(s) Oral daily  cyanocobalamin 1000 MICROGram(s) Oral daily  dextrose 40% Gel 15 Gram(s) Oral once  dextrose 5%. 1000 milliLiter(s) IV Continuous <Continuous>  dextrose 5%. 1000 milliLiter(s) IV Continuous <Continuous>  dextrose 50% Injectable 25 Gram(s) IV Push once  dextrose 50% Injectable 12.5 Gram(s) IV Push once  dextrose 50% Injectable 25 Gram(s) IV Push once  enoxaparin Injectable 40 milliGRAM(s) SubCutaneous daily  ferrous    sulfate 325 milliGRAM(s) Oral daily  folic acid 1 milliGRAM(s) Oral daily  glucagon  Injectable 1 milliGRAM(s) IntraMuscular once  hydrALAZINE 25 milliGRAM(s) Oral every 12 hours  hydroxychloroquine 200 milliGRAM(s) Oral two times a day  insulin glargine Injectable (LANTUS) 10 Unit(s) SubCutaneous at bedtime  insulin lispro (ADMELOG) corrective regimen sliding scale   SubCutaneous Before meals and at bedtime  multivitamin 1 Tablet(s) Oral daily  sulfaSALAzine 1000 milliGRAM(s) Oral two times a day    Allergies    No Known Allergies    Intolerances                            9.3    9.94  )-----------( 293      ( 24 Feb 2022 07:44 )             30.2     24 Feb 2022 07:44    143    |  108    |  19     ----------------------------<  154    4.0     |  24     |  1.14     Ca    9.4        24 Feb 2022 07:44  Phos  2.5       24 Feb 2022 07:44  Mg     1.6       24 Feb 2022 07:44    TPro  6.8    /  Alb  3.2    /  TBili  0.2    /  DBili  x      /  AST  26     /  ALT  9      /  AlkPhos  70     24 Feb 2022 07:44    PT/INR - ( 24 Feb 2022 07:44 )   PT: 13.1 sec;   INR: 1.10          PTT - ( 24 Feb 2022 07:44 )  PTT:32.2 sec  Vital Signs Last 24 Hrs  T(C): 36.9 (02-24-22 @ 06:31), Max: 37.6 (02-24-22 @ 00:46)  T(F): 98.5 (02-24-22 @ 06:31), Max: 99.6 (02-24-22 @ 00:46)  HR: 76 (02-24-22 @ 06:31) (71 - 90)  BP: 147/79 (02-24-22 @ 06:31) (145/70 - 193/84)  BP(mean): --  RR: 18 (02-24-22 @ 06:31) (12 - 28)  SpO2: 98% (02-24-22 @ 06:31) (95% - 100%)    Imaging: XR demonstates Right hip prosthesis   Posterior  dislocation, no obvious fracture.    Physical Exam  General: NAD, Alert, Awake and oriented  Neurologic: No gross deficits, moving all 4s.  Head: NCAT without abrasions, lacerations, or ecchymosis to head, face, or scalp  Eyes: PERRL  Neck/C-Spine: FAROM. No bony TTP.    HIPS and PELVIS: Unable to SLR R Hip.     RIGHT LE: Healed scar over hip.  No open skin. Internally Rotated and shortened. No deformities or other signs of trauma at  knee, lower leg, ankle or foot. Full baseline painless ROM at ankle and toes with. Unable to actively SLR. SILT toes 1-5. + DP pulses palpable with brisk cap refill distally. Compartments soft and compressible.         A/P: 71y Female with Right hip prosthesis dislocation s/p Total hip Arthroplasty**  Procedure: Closed Reduction under conscious sedation in ED    -Post reduction films show hip is located  -Post reduction exam unchanged, +EHL FHL TA GS  -WBAT, posterior and anterior hip precautions  - pt admitted to medicine for rehab placement  - pt provided contacts of joints surgeon Dr Vandana Arguello and Dr Leonard Almanzar for f/u of ortho care  -Discussed with Attending Dr. Ladd
  Patient is a 71y old  Female who presents with a chief complaint of hip dislocation (24 Feb 2022 11:38)       HPI:  70yo F with PMHx HTN, HLD, DM2, CKD4, RA, lumbar spine degenerative disease, osteopenia BIBA c/o R foot numbness.  She had a mechanical fall with subsequent R hip fracture that was surgically repaired at [facility] on 1/24/22.  She was discharged home and was pain free and able to walk for a couple of weeks.  SHe then began noticing that her R leg had significant pain and numbness.  SHe returned to [facility] and was found to have a R hip dislocation, which was reduced on Sun, 2/23.  She was discharged 1 day ago but noticed return of numbness to her R foot so she decided to come to [facility] for further evaluation.  While in the [facility] ED, she was found to have another R hip dislocation, which was subsequently reduced by orthopedic surgery.  She was in pain but the toradol helped and she is now pain free upon interview.  She denies any numbness/tingling as well.  She lives alone and is able to take care of herself.  She has been using a walker since her surgery but prior to this she was ambulating on her own without assistance devices.    In the ED:  Initial vital signs: T: 99.3 F, HR: 82, BP: 147/84, R: 17, SpO2: 97% on RA  ED course:   Labs: significant for Hgb 9.4, MCV 84.8,   Imaging:  CXR:   XR R hip: R hip dislocation.  XR R hip: Status post right hip arthroplasty reduction.  Osteopenia.  Large, calcified fibroid.  EKG:   Medications: toradol 15mg  Consults: ortho (23 Feb 2022 20:20)      PAST MEDICAL & SURGICAL HISTORY:  HTN (hypertension)    HLD (hyperlipidemia)    DM (diabetes mellitus), type 2    Rheumatoid arthritis    Stage 4 chronic kidney disease    Degenerative joint disease (DJD) of lumbar spine    Osteopenia    S/P total right hip arthroplasty        MEDICATIONS  (STANDING):  amLODIPine   Tablet 10 milliGRAM(s) Oral every 24 hours  ascorbic acid 500 milliGRAM(s) Oral daily  aspirin  chewable 81 milliGRAM(s) Oral daily  atorvastatin 40 milliGRAM(s) Oral at bedtime  calcium carbonate    500 mG (Tums) Chewable 1 Tablet(s) Chew two times a day  cholecalciferol 1000 Unit(s) Oral daily  cyanocobalamin 1000 MICROGram(s) Oral daily  dextrose 40% Gel 15 Gram(s) Oral once  dextrose 5%. 1000 milliLiter(s) (50 mL/Hr) IV Continuous <Continuous>  dextrose 5%. 1000 milliLiter(s) (100 mL/Hr) IV Continuous <Continuous>  dextrose 50% Injectable 25 Gram(s) IV Push once  dextrose 50% Injectable 12.5 Gram(s) IV Push once  dextrose 50% Injectable 25 Gram(s) IV Push once  enoxaparin Injectable 40 milliGRAM(s) SubCutaneous daily  ferrous    sulfate 325 milliGRAM(s) Oral daily  folic acid 1 milliGRAM(s) Oral daily  glucagon  Injectable 1 milliGRAM(s) IntraMuscular once  hydrALAZINE 25 milliGRAM(s) Oral every 12 hours  hydroxychloroquine 200 milliGRAM(s) Oral two times a day  insulin glargine Injectable (LANTUS) 10 Unit(s) SubCutaneous at bedtime  insulin lispro (ADMELOG) corrective regimen sliding scale   SubCutaneous Before meals and at bedtime  multivitamin 1 Tablet(s) Oral daily  sulfaSALAzine 1000 milliGRAM(s) Oral two times a day    MEDICATIONS  (PRN):  acetaminophen     Tablet .. 650 milliGRAM(s) Oral every 6 hours PRN Temp greater or equal to 38C (100.4F), Mild Pain (1 - 3)  melatonin 3 milliGRAM(s) Oral at bedtime PRN Insomnia  morphine  - Injectable 2 milliGRAM(s) IV Push every 4 hours PRN Severe Pain (7 - 10)  oxyCODONE    IR 5 milliGRAM(s) Oral every 4 hours PRN Moderate Pain (4 - 6)      FAMILY HISTORY:  No pertinent family history in first degree relatives        CBC Full  -  ( 24 Feb 2022 07:44 )  WBC Count : 9.94 K/uL  RBC Count : 3.51 M/uL  Hemoglobin : 9.3 g/dL  Hematocrit : 30.2 %  Platelet Count - Automated : 293 K/uL  Mean Cell Volume : 86.0 fl  Mean Cell Hemoglobin : 26.5 pg  Mean Cell Hemoglobin Concentration : 30.8 gm/dL  Auto Neutrophil # : 7.08 K/uL  Auto Lymphocyte # : 1.55 K/uL  Auto Monocyte # : 0.84 K/uL  Auto Eosinophil # : 0.29 K/uL  Auto Basophil # : 0.05 K/uL  Auto Neutrophil % : 71.2 %  Auto Lymphocyte % : 15.6 %  Auto Monocyte % : 8.5 %  Auto Eosinophil % : 2.9 %  Auto Basophil % : 0.5 %      02-24    143  |  108  |  19  ----------------------------<  154<H>  4.0   |  24  |  1.14    Ca    9.4      24 Feb 2022 07:44  Phos  2.5     02-24  Mg     1.6     02-24    TPro  6.8  /  Alb  3.2<L>  /  TBili  0.2  /  DBili  x   /  AST  26  /  ALT  9<L>  /  AlkPhos  70  02-24            Radiology:    < from: Xray Hip w/ Pelvis 2 or 3 Views, Right (02.23.22 @ 15:33) >    ACC: [id number] EXAM:  XR HIP WITH PELV 2-3V RT                          PROCEDURE DATE:  02/23/2022          INTERPRETATION:  TECHNIQUE: Two views right hip an AP view the pelvis    COMPARISON: None    CLINICAL HISTORY: Right hip pain    FINDINGS:    AP and frogleg views of the right hip shows a dislocated total right hip   arthroplasty. Large uterine fibroid.    IMPRESSION:  Dislocated total right hip arthroplasty. The femoral   prosthesis is displaced superiorly.        < from: Xray Hip w/ Pelvis 2 or 3 Views, Right (02.23.22 @ 18:06) >  ACC: [id number] EXAM:  XR HIP WITH PELV 2-3V RT                          PROCEDURE DATE:  02/23/2022          INTERPRETATION:  TECHNIQUE: Two views right hip    COMPARISON: 2/23/2022    CLINICAL HISTORY: Right hip reduction    FINDINGS:    AP and frogleg views of the right hip shows no evidence for acute   fracture, subluxation or dislocation. No appreciable soft tissue   abnormality. Status post reduction of dislocated total right hip   arthroplasty. Large calcified uterine fibroid. Osteopenia. Consider   follow-up or CT as clinically warranted.    IMPRESSION:  Status post right hip arthroplasty reduction.            Vital Signs Last 24 Hrs  T(C): 37.1 (24 Feb 2022 10:19), Max: 37.6 (24 Feb 2022 00:46)  T(F): 98.7 (24 Feb 2022 10:19), Max: 99.6 (24 Feb 2022 00:46)  HR: 83 (24 Feb 2022 10:19) (71 - 90)  BP: 149/79 (24 Feb 2022 10:19) (145/70 - 193/84)  BP(mean): --  RR: 17 (24 Feb 2022 10:19) (12 - 28)  SpO2: 97% (24 Feb 2022 10:19) (95% - 100%)        REVIEW OF SYSTEMS:    CONSTITUTIONAL: No fever, weight loss, or fatigue  EYES: No eye pain, visual disturbances, or discharge  ENMT:  No difficulty hearing, tinnitus, vertigo; No sinus or throat pain  NECK: No pain or stiffness  BREASTS: No pain, masses, or nipple discharge  RESPIRATORY: No cough, wheezing, chills or hemoptysis; No shortness of breath  CARDIOVASCULAR: No chest pain, palpitations, dizziness, or leg swelling  GASTROINTESTINAL: No abdominal or epigastric pain. No nausea, vomiting, or hematemesis; No diarrhea or constipation. No melena or hematochezia.  GENITOURINARY: No dysuria, frequency, hematuria, or incontinence  NEUROLOGICAL: No headaches, memory loss, loss of strength, numbness, or tremors  SKIN: No itching, burning, rashes, or lesions   LYMPH NODES: No enlarged glands  ENDOCRINE: No heat or cold intolerance; No hair loss  MUSCULOSKELETAL:  r hip dislocation  PSYCHIATRIC: No depression, anxiety, mood swings, or difficulty sleeping  HEME/LYMPH: No easy bruising, or bleeding gums  ALLERGY AND IMMUNOLOGIC: No hives or eczema  VASCULAR: no swelling, erythema,           Physical Exam:  72 yo AA woman lying in semi Zee's position, in acute complaints    Neurologic Exam:    Alert and oriented x 3     Motor Exam:    Right UE:             5/5    Left UE:               5/5      Right LE:             unable to flex hip, distally 4/5    Left LE:               4/5               Sensation:           intact to light touch x 4 extremities                                                Gait:  not tested              PM&R Impression:    1) R hip dislocation/ s/p closed reduction  2) WBAT/ total hip precautions per ortho    Recommendations/ Plan :    1) Physical / Occupational therapy focusing on therapeutic exercises, bed mobility/transfer out of bed evaluation, progressive ambulation with assistive devices prn.    2) Anticipated Disposition Plan/Recs:    subacute rehab placement

## 2022-02-24 NOTE — DISCHARGE NOTE PROVIDER - CARE PROVIDER_API CALL
Darwin Arguello)  Orthopaedic Surgery Surgery  [address] [address]  Phone: [phone]  Fax: [phone]  Follow Up Time: 2 weeks    Leonard Almanzar)  Orthopedics  [address] [place] [address]  Phone: [phone]  Fax: [phone]  Follow Up Time: 2 weeks

## 2022-02-24 NOTE — CONSULT NOTE ADULT - ASSESSMENT
per Internal Medicine    72 yo F with PMHx HTN, HLD, DM2, CKD4, RA, lumbar spine degenerative disease, osteopenia s/p R hip arthroplasty presents c/o R foot numbness found to have Right hip prosthesis dislocation s/p Total hip Arthroplasty, now s/p reduction.      Problem/Plan - 1:  ·  Problem: Hip dislocation, right.   ·  Plan: Patient s/p total R hip arthroplasty on 1/24/22 at [facility].  Now with 2nd dislocation of R hip after surgery, successfully Closed Reduction under conscious sedation in ED by orthopedic surgery in ED and in R knee immobilizer, post reduction films show hip is located.  - f/u ortho recs  - PT recommends SULMA  - pt provided contacts of joints surgeon Dr Vandana Arguello and Dr Leonard Almanzar for f/u of ortho care  - pain control:  tylenol 650mg PO q6h prn mild  toradol 25mg PO q4h prn mod  morphine 2mg IVP q4h prn sev.    Problem/Plan - 2:  ·  Problem: HTN (hypertension).   ·  Plan: Hx of HTN on hydralazine 25mg bid, amlodipine 10mg qd, metoprolol succinate 100mg qd, losartan 100mg qd, spironolactone 25mg qd.    - start hydral 25mg qd  - restart other meds accordingly.    Problem/Plan - 3:  ·  Problem: DM (diabetes mellitus), type 2.   ·  Plan: Patient has history of DM on Januvia 50mg qd, Novolog 5u tid, Levemir 12u qhs, farxiga 5mg qd.  - mSSI  - 10u levemir and increase as tolerated.    Problem/Plan - 4:  ·  Problem: Rheumatoid arthritis.   ·  Plan: Patient has history of RA on Hydroxychloroquine 200mg bid and sulfasalazine 1000mg bid.  - Continue home meds.    Problem/Plan - 5:  ·  Problem: HLD (hyperlipidemia).   ·  Plan: Patient has history of HLD on lipitor 40mg qd.  - Continue home meds.    Problem/Plan - 6:  ·  Problem: Stage 4 chronic kidney disease.   ·  Plan: Patient has history of CKD 4 as per chart review.  Cr upon admission 1.10. Patient clinically euvolemic on exam.  - Daily BMP to monitor.    Problem/Plan - 7:  ·  Problem: Degenerative joint disease (DJD) of lumbar spine.   ·  Plan: Hx of lumbar spine degenerative joint disease on Morphine 30mg qd, although patient states she has not been taking it regularly since her recent hospitalization.  Follows with pain specialist, Dr. Pedroza.  - I-STOP  - monitor pain  - monitor for signs of opioid withdrawal.    Problem/Plan - 8:  ·  Problem: Osteopenia.   ·  Plan: Hx of osteopenia, on Vit D.  Not taking calcium supplementation regularly.  - Vit D and calcium supplementation.    Problem/Plan - 9:  ·  Problem: Nutrition, metabolism, and development symptoms.   ·  Plan: F: none  E: replete prn  N: CC, low salt  DVT proph: lovenox  GI proph: none needed    FULL CODE.

## 2022-02-24 NOTE — DISCHARGE NOTE PROVIDER - NSDCFUSCHEDAPPT_GEN_ALL_CORE_FT
MICHAEL READ ; 03/17/2022 ; NPP [facility] [address]  MICHAEL READ ; 04/21/2022 ; NPP Rhe[facility] [address]

## 2022-02-24 NOTE — DISCHARGE NOTE PROVIDER - HOSPITAL COURSE
72yo F with PMHx HTN, HLD, DM2, CKD4, RA, lumbar spine degenerative disease, osteopenia s/p R hip arthroplasty presents c/o R foot numbness found to have Right hip prosthesis dislocation s/p Total hip Arthroplasty, now s/p reduction.      # Hip dislocation, right.   Patient s/p total R hip arthroplasty on 1/24/22 at [facility].  Now with 2nd dislocation of R hip after surgery, successfully Closed Reduction under conscious sedation in ED by orthopedic surgery in ED and in R knee immobilizer, post reduction films show hip is located.  - Physical therapy evaluated patient and recommended  SULMA  - pt provided contacts of joints surgeon Dr Vandana Arguello and Dr Leonard Almanzar for f/u of ortho care  - pain control:  tylenol 650mg PO q6h prn mild  toradol 25mg PO q4h prn mod  morphine 2mg IVP q4h prn sev.    # HTN (hypertension).   Hx of HTN on hydralazine 25mg bid, amlodipine 10mg qd, metoprolol succinate 100mg qd, losartan 100mg qd, spironolactone 25mg qd.    - Continue home medication    # DM (diabetes mellitus), type 2.   Patient has history of DM on Januvia 50mg qd, Novolog 5u tid, Levemir 12u qhs, farxiga 5mg qd.  - Continue home medication    # Rheumatoid arthritis.   Patient has history of RA on Hydroxychloroquine 200mg bid and sulfasalazine 1000mg bid.  - Continue home medication    # HLD (hyperlipidemia).   Patient has history of HLD on lipitor 40mg qd.  - Continue home medication     Problem/Plan - 6:  ·  Problem: Stage 4 chronic kidney disease.   ·  Plan: Patient has history of CKD 4 as per chart review.  Cr upon admission 1.10. Patient clinically euvolemic on exam.  - Daily BMP to monitor.     Problem/Plan - 7:  ·  Problem: Degenerative joint disease (DJD) of lumbar spine.   ·  Plan: Hx of lumbar spine degenerative joint disease on Morphine 30mg qd, although patient states she has not been taking it regularly since her recent hospitalization.  Follows with pain specialist, Dr. Pedroza.  - I-STOP  - monitor pain  - monitor for signs of opioid withdrawal.     Problem/Plan - 8:  ·  Problem: Osteopenia.   ·  Plan: Hx of osteopenia, on Vit D.  Not taking calcium supplementation regularly.  - Vit D and calcium supplementation.     Problem/Plan - 9:  ·  Problem: Nutrition, metabolism, and development symptoms.   ·  Plan: F: none  E: replete prn  N: CC, low salt  DVT proph: lovenox  GI proph: none needed 70yo F with PMHx HTN, HLD, DM2, CKD4, RA, lumbar spine degenerative disease, osteopenia s/p R hip arthroplasty presents c/o R foot numbness found to have Right hip prosthesis dislocation s/p Total hip Arthroplasty, now s/p reduction.      # Hip dislocation, right.   Patient s/p total R hip arthroplasty on 1/24/22 at [facility].  Now with 2nd dislocation of R hip after surgery, successfully Closed Reduction under conscious sedation in ED by orthopedic surgery in ED and in R knee immobilizer, post reduction films show hip is located.  - Physical therapy evaluated patient and recommended  SULMA  - pt provided contacts of joints surgeon Dr Vandana Arguello and Dr Leonard Almanzar for f/u of ortho care  - pain control:  tylenol 650mg PO q6h prn mild  toradol 25mg PO q4h prn mod  morphine 2mg IVP q4h prn sev.    # HTN (hypertension).   Hx of HTN on hydralazine 25mg bid, amlodipine 10mg qd, metoprolol succinate 100mg qd, losartan 100mg qd, spironolactone 25mg qd.    - Continue home medication    # DM (diabetes mellitus), type 2.   Patient has history of DM on Januvia 50mg qd, Novolog 5u tid, Levemir 12u qhs, farxiga 5mg qd.  - Continue home medication    # Rheumatoid arthritis.   Patient has history of RA on Hydroxychloroquine 200mg bid and sulfasalazine 1000mg bid.  - Continue home medication    # HLD (hyperlipidemia).   Patient has history of HLD on lipitor 40mg qd.  - Continue home medication    # Stage 4 chronic kidney disease.   Patient has history of CKD 4 as per chart review.  Cr upon admission 1.10. Patient clinically euvolemic on exam.  - Daily BMP to monitor.    # Degenerative joint disease (DJD) of lumbar spine.   Hx of lumbar spine degenerative joint disease on Morphine 30mg qd, although patient states she has not been taking it regularly since her recent hospitalization.  Follows with pain specialist, Dr. Pedroza.  - I-STOP  - monitor pain  - monitor for signs of opioid withdrawal.    # Osteopenia.   Hx of osteopenia, on Vit D.  Not taking calcium supplementation regularly.  - Vit D and calcium supplementation.    Patient was discharged to: [facility]  New medications: none  Changes to old medications: none   Medications that were stopped: none  Items to Follow up as outpatient: none   Physical exam at time of discharge:     VITALS:   T(C): 37.1 (02-24-22 @ 10:19), Max: 37.6 (02-24-22 @ 00:46)  HR: 83 (02-24-22 @ 10:19) (71 - 90)  BP: 149/79 (02-24-22 @ 10:19) (145/70 - 193/84)  RR: 17 (02-24-22 @ 10:19) (12 - 28)  SpO2: 97% (02-24-22 @ 10:19) (95% - 100%)    PHYSICAL EXAM:  Constitutional: resting comfortably, lying in bed, no acute distress   HEENT: PERRL, EOMI, sclera non-icteric, neck supple, trachea midline, no masses, no JVD, MMM  Respiratory: CTA b/l, good air entry b/l, no wheezing, no rhonchi, no rales, without accessory muscle use and no intercostal retractions  Cardiovascular: RRR, normal S1S2, no M/R/G  Gastrointestinal: soft, NTND, no masses palpable, BS normal  Extremities: Warm, well perfused, pulses equal bilateral upper and lower extremities, right leg in knee immobilizer  Neurological: AAOx3  Skin: Normal temperature, warm, dry 70yo F with PMHx HTN, HLD, DM2, CKD4, RA, lumbar spine degenerative disease, osteopenia s/p R hip arthroplasty presents c/o R foot numbness found to have Right hip prosthesis dislocation s/p Total hip Arthroplasty, now s/p reduction.      # Hip dislocation, right.   Patient s/p total R hip arthroplasty on 1/24/22 at [facility].  Now with 2nd dislocation of R hip after surgery, successfully Closed Reduction under conscious sedation in ED by orthopedic surgery in ED and in R knee immobilizer, post reduction films show hip is located.  - Physical therapy evaluated patient and recommended  SULMA  - pt provided contacts of joints surgeon Dr Vandana Arguello and Dr Leonard Almanzar for f/u of ortho care  - pain control:  tylenol 650mg PO q6h prn mild  toradol 25mg PO q4h prn mod  morphine 2mg IVP q4h prn sev.    # HTN (hypertension).   Hx of HTN on hydralazine 25mg bid, amlodipine 10mg qd, metoprolol succinate 100mg qd, losartan 100mg qd, spironolactone 25mg qd.    - Continue home medication    # DM (diabetes mellitus), type 2.   Patient has history of DM on Januvia 50mg qd, Novolog 5u tid, Levemir 12u qhs, farxiga 5mg qd.  - Continue home medication    # Rheumatoid arthritis.   Patient has history of RA on Hydroxychloroquine 200mg bid and sulfasalazine 1000mg bid.  - Continue home medication    # HLD (hyperlipidemia).   Patient has history of HLD on lipitor 40mg qd.  - Continue home medication    # Stage 4 chronic kidney disease.   Patient has history of CKD 4 as per chart review.  Cr upon admission 1.10. Patient clinically euvolemic on exam.  - Followup with Nephrologist    # Degenerative joint disease (DJD) of lumbar spine.   Hx of lumbar spine degenerative joint disease on Morphine 30mg qd, although patient states she has not been taking it regularly since her recent hospitalization.  Follows with pain specialist, Dr. Pedroza.  - Followup with PCP or pain management doctor      # Osteopenia.   Hx of osteopenia, on Vit D.  Not taking calcium supplementation regularly.  - Vit D and calcium supplementation.    Patient was discharged to: [facility]  New medications: none  Changes to old medications: none   Medications that were stopped: none  Items to Follow up as outpatient: none   Physical exam at time of discharge:     VITALS:   T(C): 37.1 (02-24-22 @ 10:19), Max: 37.6 (02-24-22 @ 00:46)  HR: 83 (02-24-22 @ 10:19) (71 - 90)  BP: 149/79 (02-24-22 @ 10:19) (145/70 - 193/84)  RR: 17 (02-24-22 @ 10:19) (12 - 28)  SpO2: 97% (02-24-22 @ 10:19) (95% - 100%)    PHYSICAL EXAM:  Constitutional: resting comfortably, lying in bed, no acute distress   HEENT: PERRL, EOMI, sclera non-icteric, neck supple, trachea midline, no masses, no JVD, MMM  Respiratory: CTA b/l, good air entry b/l, no wheezing, no rhonchi, no rales, without accessory muscle use and no intercostal retractions  Cardiovascular: RRR, normal S1S2, no M/R/G  Gastrointestinal: soft, NTND, no masses palpable, BS normal  Extremities: Warm, well perfused, pulses equal bilateral upper and lower extremities, right leg in knee immobilizer  Neurological: AAOx3  Skin: Normal temperature, warm, dry

## 2022-02-24 NOTE — PROGRESS NOTE ADULT - PROBLEM SELECTOR PLAN 3
100
Patient has history of DM on Januvia 50mg qd, Novolog 5u tid, Levemir 12u qhs, farxiga 5mg qd.  - mSSI  - 10u levemir and increase as tolerated

## 2022-02-24 NOTE — PROGRESS NOTE ADULT - PROBLEM SELECTOR PLAN 4
Patient has history of RA on Hydroxychloroquine 200mg bid and sulfasalazine 1000mg bid.  - Continue home meds

## 2022-02-24 NOTE — OCCUPATIONAL THERAPY INITIAL EVALUATION ADULT - PERTINENT HX OF CURRENT PROBLEM, REHAB EVAL
CONTINUE:  SHe returned to [facility], [place], operated by [facility] and was found to have a R hip dislocation, which was reduced on Sun, 2/23.  She was discharged 1 day ago but noticed return of numbness to her R foot so she decided to come to [facility] for further evaluation.  While in the [facility] ED, she was found to have another R hip dislocation, which was subsequently reduced by orthopedic surgery.

## 2022-02-24 NOTE — OCCUPATIONAL THERAPY INITIAL EVALUATION ADULT - MD ORDER
70yo BIBA c/o R foot numbness.  She had a mechanical fall with subsequent R hip fracture that was surgically repaired at [facility], [place], operated by [facility] on 1/24/22.  She was discharged home and was pain free and able to walk for a couple of weeks.  SHe then began noticing that her R leg had significant pain and numbness.

## 2022-02-24 NOTE — PROGRESS NOTE ADULT - PROBLEM SELECTOR PLAN 7
Hx of lumbar spine degenerative joint disease on Morphine 30mg qd, although patient states she has not been taking it regularly since her recent hospitalization.  Follows with pain specialist, Dr. Pedroza.  - I-STOP  - monitor pain  - monitor for signs of opioid withdrawal

## 2022-02-24 NOTE — DISCHARGE NOTE PROVIDER - CARE PROVIDERS DIRECT ADDRESSES
[email or web address]

## 2022-02-24 NOTE — OCCUPATIONAL THERAPY INITIAL EVALUATION ADULT - DIAGNOSIS, OT EVAL
Pt p/w R knee externally rotated ~5 degrees demonstrating decrease in balance and strength affecting ADLs and functional mobility.

## 2022-02-24 NOTE — DISCHARGE NOTE NURSING/CASE MANAGEMENT/SOCIAL WORK - PATIENT PORTAL LINK FT
You can access the FollowMyHealth Patient Portal offered by [facility] by registering at the following website: [email or web address]. By joining [facility]’s FollowMyHealth portal, you will also be able to view your health information using other applications (apps) compatible with our system.

## 2022-02-24 NOTE — PROGRESS NOTE ADULT - SUBJECTIVE AND OBJECTIVE BOX
INTERVAL HPI/OVERNIGHT EVENTS:  All events noted  Patient known to me from office  Vital signs noted;  BP increased      MEDICATIONS  (STANDING):  ascorbic acid 500 milliGRAM(s) Oral daily  aspirin  chewable 81 milliGRAM(s) Oral daily  atorvastatin 40 milliGRAM(s) Oral at bedtime  calcium carbonate    500 mG (Tums) Chewable 1 Tablet(s) Chew two times a day  cholecalciferol 1000 Unit(s) Oral daily  cyanocobalamin 1000 MICROGram(s) Oral daily  dextrose 40% Gel 15 Gram(s) Oral once  dextrose 5%. 1000 milliLiter(s) (50 mL/Hr) IV Continuous <Continuous>  dextrose 5%. 1000 milliLiter(s) (100 mL/Hr) IV Continuous <Continuous>  dextrose 50% Injectable 25 Gram(s) IV Push once  dextrose 50% Injectable 12.5 Gram(s) IV Push once  dextrose 50% Injectable 25 Gram(s) IV Push once  enoxaparin Injectable 40 milliGRAM(s) SubCutaneous daily  ferrous    sulfate 325 milliGRAM(s) Oral daily  folic acid 1 milliGRAM(s) Oral daily  glucagon  Injectable 1 milliGRAM(s) IntraMuscular once  hydrALAZINE 25 milliGRAM(s) Oral every 12 hours  hydroxychloroquine 200 milliGRAM(s) Oral two times a day  insulin glargine Injectable (LANTUS) 10 Unit(s) SubCutaneous at bedtime  insulin lispro (ADMELOG) corrective regimen sliding scale   SubCutaneous Before meals and at bedtime  magnesium sulfate  IVPB 2 Gram(s) IV Intermittent once  multivitamin 1 Tablet(s) Oral daily  sulfaSALAzine 1000 milliGRAM(s) Oral two times a day    MEDICATIONS  (PRN):  acetaminophen     Tablet .. 650 milliGRAM(s) Oral every 6 hours PRN Temp greater or equal to 38C (100.4F), Mild Pain (1 - 3)  melatonin 3 milliGRAM(s) Oral at bedtime PRN Insomnia  morphine  - Injectable 2 milliGRAM(s) IV Push every 4 hours PRN Severe Pain (7 - 10)  oxyCODONE    IR 5 milliGRAM(s) Oral every 4 hours PRN Moderate Pain (4 - 6)      Allergies    No Known Allergies    Intolerances        Vital Signs Last 24 Hrs  T(C): 37.1 (24 Feb 2022 10:19), Max: 37.6 (24 Feb 2022 00:46)  T(F): 98.7 (24 Feb 2022 10:19), Max: 99.6 (24 Feb 2022 00:46)  HR: 83 (24 Feb 2022 10:19) (71 - 90)  BP: 149/79 (24 Feb 2022 10:19) (145/70 - 193/84)  BP(mean): --  RR: 17 (24 Feb 2022 10:19) (12 - 28)  SpO2: 97% (24 Feb 2022 10:19) (95% - 100%)          Constitutional: Awake and alert    Eyes: NEDRA    ENMT: Negative    Neck: Supple    Back:  no tenderness     Respiratory:  clear    Cardiovascular: S1 S2    Gastrointestinal:  soft    Genitourinary:    Extremities:  no  edema     Vascular:    Neurological:    Skin:    Lymph Nodes:            LABS:                        9.3    9.94  )-----------( 293      ( 24 Feb 2022 07:44 )             30.2     02-24    143  |  108  |  19  ----------------------------<  154<H>  4.0   |  24  |  1.14    Ca    9.4      24 Feb 2022 07:44  Phos  2.5     02-24  Mg     1.6     02-24    TPro  6.8  /  Alb  3.2<L>  /  TBili  0.2  /  DBili  x   /  AST  26  /  ALT  9<L>  /  AlkPhos  70  02-24    PT/INR - ( 24 Feb 2022 07:44 )   PT: 13.1 sec;   INR: 1.10          PTT - ( 24 Feb 2022 07:44 )  PTT:32.2 sec      RADIOLOGY & ADDITIONAL TESTS:  
INTERVAL HPI/OVERNIGHT EVENTS:  Patient was seen and examined at bedside. Patient reports some pain in right leg but much improved compared to prior.  She reports numbness also improved.  can move both legs. Patient denies: fever, chills, dizziness, weakness, HA, Changes in vision, CP, palpitations, SOB, cough, N/V/D/C, dysuria, changes in bowel movements, LE edema. ROS otherwise negative.    VITAL SIGNS:  T(F): 98.7 (02-24-22 @ 10:19)  HR: 83 (02-24-22 @ 10:19)  BP: 149/79 (02-24-22 @ 10:19)  RR: 17 (02-24-22 @ 10:19)  SpO2: 97% (02-24-22 @ 10:19)  Wt(kg): --    PHYSICAL EXAM:  Constitutional: resting comfortably, lying in bed, no acute distress   HEENT: PERRL, EOMI, sclera non-icteric, neck supple, trachea midline, no masses, no JVD, MMM  Respiratory: CTA b/l, good air entry b/l, no wheezing, no rhonchi, no rales, without accessory muscle use and no intercostal retractions  Cardiovascular: RRR, normal S1S2, no M/R/G  Gastrointestinal: soft, NTND, no masses palpable, BS normal  Extremities: Warm, well perfused, pulses equal bilateral upper and lower extremities, right leg in knee immobilizer  Neurological: AAOx3  Skin: Normal temperature, warm, dry    MEDICATIONS  (STANDING):  ascorbic acid 500 milliGRAM(s) Oral daily  aspirin  chewable 81 milliGRAM(s) Oral daily  atorvastatin 40 milliGRAM(s) Oral at bedtime  calcium carbonate    500 mG (Tums) Chewable 1 Tablet(s) Chew two times a day  cholecalciferol 1000 Unit(s) Oral daily  cyanocobalamin 1000 MICROGram(s) Oral daily  dextrose 40% Gel 15 Gram(s) Oral once  dextrose 5%. 1000 milliLiter(s) (50 mL/Hr) IV Continuous <Continuous>  dextrose 5%. 1000 milliLiter(s) (100 mL/Hr) IV Continuous <Continuous>  dextrose 50% Injectable 25 Gram(s) IV Push once  dextrose 50% Injectable 12.5 Gram(s) IV Push once  dextrose 50% Injectable 25 Gram(s) IV Push once  enoxaparin Injectable 40 milliGRAM(s) SubCutaneous daily  ferrous    sulfate 325 milliGRAM(s) Oral daily  folic acid 1 milliGRAM(s) Oral daily  glucagon  Injectable 1 milliGRAM(s) IntraMuscular once  hydrALAZINE 25 milliGRAM(s) Oral every 12 hours  hydroxychloroquine 200 milliGRAM(s) Oral two times a day  insulin glargine Injectable (LANTUS) 10 Unit(s) SubCutaneous at bedtime  insulin lispro (ADMELOG) corrective regimen sliding scale   SubCutaneous Before meals and at bedtime  magnesium sulfate  IVPB 2 Gram(s) IV Intermittent once  multivitamin 1 Tablet(s) Oral daily  sulfaSALAzine 1000 milliGRAM(s) Oral two times a day    MEDICATIONS  (PRN):  acetaminophen     Tablet .. 650 milliGRAM(s) Oral every 6 hours PRN Temp greater or equal to 38C (100.4F), Mild Pain (1 - 3)  melatonin 3 milliGRAM(s) Oral at bedtime PRN Insomnia  morphine  - Injectable 2 milliGRAM(s) IV Push every 4 hours PRN Severe Pain (7 - 10)  oxyCODONE    IR 5 milliGRAM(s) Oral every 4 hours PRN Moderate Pain (4 - 6)      Allergies    No Known Allergies    Intolerances        LABS:                        9.3    9.94  )-----------( 293      ( 24 Feb 2022 07:44 )             30.2     02-24    143  |  108  |  19  ----------------------------<  154<H>  4.0   |  24  |  1.14    Ca    9.4      24 Feb 2022 07:44  Phos  2.5     02-24  Mg     1.6     02-24    TPro  6.8  /  Alb  3.2<L>  /  TBili  0.2  /  DBili  x   /  AST  26  /  ALT  9<L>  /  AlkPhos  70  02-24    PT/INR - ( 24 Feb 2022 07:44 )   PT: 13.1 sec;   INR: 1.10          PTT - ( 24 Feb 2022 07:44 )  PTT:32.2 sec        RADIOLOGY & ADDITIONAL TESTS:  Reviewed

## 2022-02-24 NOTE — PROGRESS NOTE ADULT - PROBLEM SELECTOR PLAN 6
Patient has history of CKD 4 as per chart review.  Cr upon admission 1.10. Patient clinically euvolemic on exam.  - Daily BMP to monitor
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
## 2022-03-01 DIAGNOSIS — M47.816 SPONDYLOSIS WITHOUT MYELOPATHY OR RADICULOPATHY, LUMBAR REGION: ICD-10-CM

## 2022-03-01 DIAGNOSIS — I12.9 HYPERTENSIVE CHRONIC KIDNEY DISEASE WITH STAGE 1 THROUGH STAGE 4 CHRONIC KIDNEY DISEASE, OR UNSPECIFIED CHRONIC KIDNEY DISEASE: ICD-10-CM

## 2022-03-01 DIAGNOSIS — M06.00 RHEUMATOID ARTHRITIS WITHOUT RHEUMATOID FACTOR, UNSPECIFIED SITE: ICD-10-CM

## 2022-03-01 DIAGNOSIS — M85.80 OTHER SPECIFIED DISORDERS OF BONE DENSITY AND STRUCTURE, UNSPECIFIED SITE: ICD-10-CM

## 2022-03-01 DIAGNOSIS — T84.020A DISLOCATION OF INTERNAL RIGHT HIP PROSTHESIS, INITIAL ENCOUNTER: ICD-10-CM

## 2022-03-01 DIAGNOSIS — E78.5 HYPERLIPIDEMIA, UNSPECIFIED: ICD-10-CM

## 2022-03-01 DIAGNOSIS — N18.4 CHRONIC KIDNEY DISEASE, STAGE 4 (SEVERE): ICD-10-CM
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
## 2022-03-01 DIAGNOSIS — E11.22 TYPE 2 DIABETES MELLITUS WITH DIABETIC CHRONIC KIDNEY DISEASE: ICD-10-CM

## 2022-03-02 LAB — HCV RNA FLD QL NAA+PROBE: SIGNIFICANT CHANGE UP

## 2022-03-17 ENCOUNTER — APPOINTMENT (OUTPATIENT)
Dept: HEART AND VASCULAR | Facility: CLINIC | Age: 72
End: 2022-03-17

## 2022-03-28 PROBLEM — I10 ESSENTIAL (PRIMARY) HYPERTENSION: Chronic | Status: ACTIVE | Noted: 2022-02-23

## 2022-03-28 PROBLEM — M85.80 OTHER SPECIFIED DISORDERS OF BONE DENSITY AND STRUCTURE, UNSPECIFIED SITE: Chronic | Status: ACTIVE | Noted: 2022-02-23

## 2022-03-28 PROBLEM — E78.5 HYPERLIPIDEMIA, UNSPECIFIED: Chronic | Status: ACTIVE | Noted: 2022-02-23

## 2022-03-28 PROBLEM — M06.9 RHEUMATOID ARTHRITIS, UNSPECIFIED: Chronic | Status: ACTIVE | Noted: 2022-02-23

## 2022-03-28 PROBLEM — N18.4 CHRONIC KIDNEY DISEASE, STAGE 4 (SEVERE): Chronic | Status: ACTIVE | Noted: 2022-02-23

## 2022-03-28 PROBLEM — M47.816 SPONDYLOSIS WITHOUT MYELOPATHY OR RADICULOPATHY, LUMBAR REGION: Chronic | Status: ACTIVE | Noted: 2022-02-23

## 2022-03-28 PROBLEM — E11.9 TYPE 2 DIABETES MELLITUS WITHOUT COMPLICATIONS: Chronic | Status: ACTIVE | Noted: 2022-02-23

## 2022-04-07 ENCOUNTER — APPOINTMENT (OUTPATIENT)
Dept: HEART AND VASCULAR | Facility: CLINIC | Age: 72
End: 2022-04-07

## 2022-04-16 NOTE — PROGRESS NOTE ADULT - TIME-BASED
25 Culture sent  C/w Levaquin  NSAIDs/pyridium prn for pain  Given urology f/u  Return precautions discussed

## 2022-04-21 ENCOUNTER — APPOINTMENT (OUTPATIENT)
Dept: HEART AND VASCULAR | Facility: CLINIC | Age: 72
End: 2022-04-21
Payer: MEDICARE

## 2022-04-21 VITALS
TEMPERATURE: 97.2 F | HEIGHT: 63 IN | OXYGEN SATURATION: 98 % | DIASTOLIC BLOOD PRESSURE: 70 MMHG | BODY MASS INDEX: 30.65 KG/M2 | SYSTOLIC BLOOD PRESSURE: 118 MMHG | HEART RATE: 60 BPM | WEIGHT: 173 LBS

## 2022-04-21 PROCEDURE — 99213 OFFICE O/P EST LOW 20 MIN: CPT

## 2022-04-21 PROCEDURE — 93000 ELECTROCARDIOGRAM COMPLETE: CPT

## 2022-04-21 NOTE — PHYSICAL EXAM
[No Acute Distress] : no acute distress [Normal Conjunctiva] : normal conjunctiva [Normal Venous Pressure] : normal venous pressure [Clear Lung Fields] : clear lung fields [No Edema] : no edema [[facility]] [facility]

## 2022-04-21 NOTE — REASON FOR VISIT
[FreeTextEntry1] : Feels generally well. Fell and broke her R hip in January, and had difficult course with hip replacement followed by dislocation. Ultimately went to rehab and discharged to home March 16.\par Now able to walk around her apartment without assist, but uses a walker to go out. Getting PT and making progress.\par BP at home 120-130/60-70 mmHg. No edema in L leg. No orthopnea, PND, YOUNG, chest pain.\par Patient reports taking her listed medications as directed.\par

## 2022-04-27 ENCOUNTER — RESULT REVIEW (OUTPATIENT)
Age: 72
End: 2022-04-27

## 2022-04-27 ENCOUNTER — OUTPATIENT (OUTPATIENT)
Dept: OUTPATIENT SERVICES | Facility: HOSPITAL | Age: 72
LOS: 1 days | End: 2022-04-27
Payer: MEDICARE

## 2022-04-27 ENCOUNTER — APPOINTMENT (OUTPATIENT)
Dept: ORTHOPEDIC SURGERY | Facility: CLINIC | Age: 72
End: 2022-04-27
Payer: MEDICARE

## 2022-04-27 VITALS
HEIGHT: 63 IN | BODY MASS INDEX: 30.65 KG/M2 | SYSTOLIC BLOOD PRESSURE: 114 MMHG | DIASTOLIC BLOOD PRESSURE: 74 MMHG | OXYGEN SATURATION: 98 % | WEIGHT: 173 LBS | HEART RATE: 78 BPM

## 2022-04-27 DIAGNOSIS — Z96.641 PRESENCE OF RIGHT ARTIFICIAL HIP JOINT: Chronic | ICD-10-CM

## 2022-04-27 PROCEDURE — 99204 OFFICE O/P NEW MOD 45 MIN: CPT

## 2022-04-27 PROCEDURE — 72020 X-RAY EXAM OF SPINE 1 VIEW: CPT

## 2022-04-27 PROCEDURE — 73502 X-RAY EXAM HIP UNI 2-3 VIEWS: CPT

## 2022-04-27 PROCEDURE — 72020 X-RAY EXAM OF SPINE 1 VIEW: CPT | Mod: 26

## 2022-04-27 PROCEDURE — 73502 X-RAY EXAM HIP UNI 2-3 VIEWS: CPT | Mod: 26,RT

## 2022-04-27 NOTE — PHYSICAL EXAM
[de-identified] : General appearance: well nourished and hydrated, pleasant, alert and oriented x 3, cooperative.  Cushingoid habitus.\par HEENT: normocephalic, EOM intact, wearing mask, external auditory canal clear.  \par Cardiovascular: no lower leg edema, no varicosities, dorsalis pedis pulses palpable and symmetric.  \par Lymphatics: no palpable lymphadenopathy, no lymphedema.  \par Neurologic: sensation is normal, no muscle weakness in upper or lower extremities, patella tendon reflexes present and symmetric.  \par Dermatologic: skin moist, warm, no rash.  \par Spine: cervical spine with normal lordosis and painless range of motion, thoracic spine with normal kyphosis and painless range of motion, lumbosacral spine with normal lordosis and painless range of motion.\par Gait: cautious but not antalgic right, in knee immobilizer, using walker.\par \par Right hip: all fields negative/normal/unremarkable unless otherwise noted --\par - Focal soft tissue swelling: \par - Ecchymosis: \par - Erythema: \par - Wounds: healed posterolateral incision\par - Tenderness: \par - ROM: \par   - Flexion: 70\par   - Extension: 0\par   - Adduction: 10\par   - Abduction: 30\par   - Internal rotation in 90 degrees of hip flexion: deferred\par   - External rotation in 90 degrees of hip flexion: 30\par - REDD: uncomfortable\par - FADIR: deferred\par - Malu: negative\par - Stinchfield: uncomfortable\par - Flexor power: 4/5\par - Abductor power: 4/5 [de-identified] : A lateral view of the lumbosacral spine, weightbearing AP pelvis, and 2 additional views (frog lateral and false profile) of the right hip were interpreted by me and reviewed with the patient.\par \par Location of imaging: [facility]\par Date of exam: 4/27/22\par \par Lumbar spine --\par Alignment: loss of normal lordosis\par Spondylosis: moderate/severe multilevel\par Listhesis: grade 1 leon L3/4, L4/5\par Posterior elements: mild multilevel facet arthrosis. Possible subacute pars fracture L4\par \par Pelvic alignment: normal. Large calcified fibroid noted. Bilateral vascular calcifications are present.\par \par Right hip -- ROSITA in position. The femoral head is concentrically reduced within the acetabular component. Acetabular component appears to be well fixed in acceptable position. The femoral component appears to be relatively deep relative to the neck osteotomy which may indicate prior subsidence. There is heterotopic ossification present along the superior acetabulum. \par \par Left hip --\par Arthritis: minimal\par Deformity: cam\par Osteonecrosis: none\par \par Imaging was reviewed from the ED visit on 2/23/22. There was a posterior hip dislocation that was successfully reduced.

## 2022-04-27 NOTE — DISCUSSION/SUMMARY
[de-identified] : 70y/o female 3mo s/p R ROSITA with 2x early dislocations\par - Begin PT\par - Recommended that she maintain posterior hip precautions for life\par - HEP\par - D/c KI as tolerated\par - We discussed that any further dislocation will merit a component revision\par - Follow up with Rheumatology for medical management of osteoporosis\par - Follow up with me in 2mo with repeat right hip XRs

## 2022-04-27 NOTE — HISTORY OF PRESENT ILLNESS
[___ mths] : [unfilled] month(s) ago [0] : a current pain level of 0/10 [Intermit.] : ~He/She~ states the symptoms seem to be intermittent [Bending] : worsened by bending [de-identified] : 4/27/22: 72y/o female presenting for evaluation of right prosthetic hip instability. She reports having suffered a mechanical trip and fall in January which resulted in a right hip fracture. This was treated acutely with a total hip arthroplasty at [facility] on 1/24/22. She had an atraumatic right hip dislocation around 2/21 which was managed with a closed reduction in the [facility] ED. She dislocated again on 2/23 and was again closed reduced, this time in the [facility] ED. She denies any fall or trauma associated with either dislocation. She has been WBAT in a right knee immobilizer and walker since then without any further episodes of instability. She reports that she has no pain now. Never started PT. Baseline preinjury activity level was community ambulator without assistive device. She has previously been diagnosed with osteoporosis but has never started on any medical management. She never wants to return to [facility] and is here to establish care.\par \par Other significant PMH includes HTN, HLD, DM, RA. [None] : No relieving factors are noted [de-identified] : patient has no pain.

## 2022-04-27 NOTE — HISTORY OF PRESENT ILLNESS
[___ mths] : [unfilled] month(s) ago [0] : a current pain level of 0/10 [Intermit.] : ~He/She~ states the symptoms seem to be intermittent [Bending] : worsened by bending [de-identified] : 4/27/22: 70y/o female presenting for evaluation of right prosthetic hip instability. She reports having suffered a mechanical trip and fall in January which resulted in a right hip fracture. This was treated acutely with a total hip arthroplasty at [facility] on 1/24/22. She had an atraumatic right hip dislocation around 2/21 which was managed with a closed reduction in the [facility] ED. She dislocated again on 2/23 and was again closed reduced, this time in the [facility] ED. She denies any fall or trauma associated with either dislocation. She has been WBAT in a right knee immobilizer and walker since then without any further episodes of instability. She reports that she has no pain now. Never started PT. Baseline preinjury activity level was community ambulator without assistive device. She has previously been diagnosed with osteoporosis but has never started on any medical management. She never wants to return to [facility] and is here to establish care.\par \par Other significant PMH includes HTN, HLD, DM, RA. [None] : No relieving factors are noted [de-identified] : patient has no pain.

## 2022-04-27 NOTE — DISCUSSION/SUMMARY
[de-identified] : 70y/o female 3mo s/p R ROSITA with 2x early dislocations\par - Begin PT\par - Recommended that she maintain posterior hip precautions for life\par - HEP\par - D/c KI as tolerated\par - We discussed that any further dislocation will merit a component revision\par - Follow up with Rheumatology for medical management of osteoporosis\par - Follow up with me in 2mo with repeat right hip XRs

## 2022-04-27 NOTE — PHYSICAL EXAM
[de-identified] : General appearance: well nourished and hydrated, pleasant, alert and oriented x 3, cooperative.  Cushingoid habitus.\par HEENT: normocephalic, EOM intact, wearing mask, external auditory canal clear.  \par Cardiovascular: no lower leg edema, no varicosities, dorsalis pedis pulses palpable and symmetric.  \par Lymphatics: no palpable lymphadenopathy, no lymphedema.  \par Neurologic: sensation is normal, no muscle weakness in upper or lower extremities, patella tendon reflexes present and symmetric.  \par Dermatologic: skin moist, warm, no rash.  \par Spine: cervical spine with normal lordosis and painless range of motion, thoracic spine with normal kyphosis and painless range of motion, lumbosacral spine with normal lordosis and painless range of motion.\par Gait: cautious but not antalgic right, in knee immobilizer, using walker.\par \par Right hip: all fields negative/normal/unremarkable unless otherwise noted --\par - Focal soft tissue swelling: \par - Ecchymosis: \par - Erythema: \par - Wounds: healed posterolateral incision\par - Tenderness: \par - ROM: \par   - Flexion: 70\par   - Extension: 0\par   - Adduction: 10\par   - Abduction: 30\par   - Internal rotation in 90 degrees of hip flexion: deferred\par   - External rotation in 90 degrees of hip flexion: 30\par - REDD: uncomfortable\par - FADIR: deferred\par - Malu: negative\par - Stinchfield: uncomfortable\par - Flexor power: 4/5\par - Abductor power: 4/5 [de-identified] : A lateral view of the lumbosacral spine, weightbearing AP pelvis, and 2 additional views (frog lateral and false profile) of the right hip were interpreted by me and reviewed with the patient.\par \par Location of imaging: [facility]\par Date of exam: 4/27/22\par \par Lumbar spine --\par Alignment: loss of normal lordosis\par Spondylosis: moderate/severe multilevel\par Listhesis: grade 1 leon L3/4, L4/5\par Posterior elements: mild multilevel facet arthrosis. Possible subacute pars fracture L4\par \par Pelvic alignment: normal. Large calcified fibroid noted. Bilateral vascular calcifications are present.\par \par Right hip -- ROSITA in position. The femoral head is concentrically reduced within the acetabular component. Acetabular component appears to be well fixed in acceptable position. The femoral component appears to be relatively deep relative to the neck osteotomy which may indicate prior subsidence. There is heterotopic ossification present along the superior acetabulum. \par \par Left hip --\par Arthritis: minimal\par Deformity: cam\par Osteonecrosis: none\par \par Imaging was reviewed from the ED visit on 2/23/22. There was a posterior hip dislocation that was successfully reduced.

## 2022-05-02 ENCOUNTER — APPOINTMENT (OUTPATIENT)
Dept: RHEUMATOLOGY | Facility: CLINIC | Age: 72
End: 2022-05-02
Payer: MEDICARE

## 2022-05-02 VITALS
OXYGEN SATURATION: 96 % | BODY MASS INDEX: 30.65 KG/M2 | HEIGHT: 63 IN | DIASTOLIC BLOOD PRESSURE: 60 MMHG | WEIGHT: 173 LBS | HEART RATE: 58 BPM | TEMPERATURE: 98.2 F | SYSTOLIC BLOOD PRESSURE: 102 MMHG

## 2022-05-02 DIAGNOSIS — D25.9 LEIOMYOMA OF UTERUS, UNSPECIFIED: ICD-10-CM

## 2022-05-02 DIAGNOSIS — M47.816 SPONDYLOSIS WITHOUT MYELOPATHY OR RADICULOPATHY, LUMBAR REGION: ICD-10-CM

## 2022-05-02 DIAGNOSIS — M16.0 BILATERAL PRIMARY OSTEOARTHRITIS OF HIP: ICD-10-CM

## 2022-05-02 DIAGNOSIS — M43.16 SPONDYLOLISTHESIS, LUMBAR REGION: ICD-10-CM

## 2022-05-02 DIAGNOSIS — M25.551 PAIN IN RIGHT HIP: ICD-10-CM

## 2022-05-02 DIAGNOSIS — Z47.1 AFTERCARE FOLLOWING JOINT REPLACEMENT SURGERY: ICD-10-CM

## 2022-05-02 PROCEDURE — 99214 OFFICE O/P EST MOD 30 MIN: CPT | Mod: 25

## 2022-05-02 NOTE — ASSESSMENT
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 \par Has chronic L spine DJD for which follows with pain management and takes Morphine daily \par Overall thinks RA is well controlled and no flares, but vectra with high disease activity 48  \par \par Continue to hold off MTX in the setting of CKD stage 4 \par Continue Plaquenil 200mg BID( eye exam up to date, no maculopathy) \par c/w  SSZ 1g BID (meds renewed today) \par \par \par Bone health- Osteoporosis on DXA done on 07/24/21 with R hip fracture,  total hip arthroplasty at [facility] [place], operated by [facility] on 1/24/22,  right hip dislocation twice managed by closed reduction. \par She has been declining OP treatment in the past but today  agrees to proceed with  prolia ( consent and orders signed today) \par \par Denosumab is given in the clinic at a dose of 60 mg subcutaneously (SC) every 6 months \par ONJ and atypical femur fractures will have been reported in patients on denosumab. \par Patients with chronic renal disease are most likely to get hypocalcemia with denosumab use and warned of this complication.\par There is an increased risk of vertebral fractures, particularly multiple vertebral fractures, after stopping denosumab. \par If denosumab is discontinued, administering an alternative therapy (typically a bisphosphonate) to prevent rapid bone loss and vertebral fracture is advised.\par Check labs today  ( blood drawn during the visit today) \par \par \par Calcium 1200 mg daily from diet and supplements (to be taken in divided doses as no more than 500-600 mg can be absorbed at one time)\par Continue current vitamin D 1000IU regimen\par \par f/u 2  months

## 2022-05-02 NOTE — HISTORY OF PRESENT ILLNESS
[___ Month(s) Ago] : [unfilled] month(s) ago [FreeTextEntry1] : Office Visit 12/22/20:\par Was taking SSZ only once per day, just increased to twice daily. \par Creatine level is stable but still 1.75 Nov 2020 \par Denies any joint pain, swelling or stiffness today \par Plan: Doing well overall \par No flares / RA stable \par Continue to monitor renal function on triple therapy - MTX dose has already been lowered in light of this\par Check labs\par \par Office Visit 9/24/20:\par Worsening of creatinine - seeing Dr. Burgess at [facility]. Most recent 1.9 (patient reported, no records) \par Last visit reduced MTX to 4mg weekly and SSZ to 500mg daily. She notes diffuse body aches since in her joints > muscles. Most severe in her shoulders, left knee and left wrist. No swelling, am stiffness about 20min. Slower with doing things but denies any significant limitation of function. \par Denies lower back pain. Remains on a stable dose of morphine. \par Plan: Attempt to taper off triple therapy, however she notes worsening joint pain. No synovitis on exam today, and thus potentially not inflammatory in etiology however she would like to increase some of the medications as she clearly notes a symptomatic change. \par Given worsening renal function will keep MTX to 10mg weekly and increase SSZ back to 1g daily. Continue Plaquenil. \par Check labs \par PRN Voltaren gel for knee pain. \par \par Office Visit 6/22/20:\par COVID IgG positive\par No joint pain \par Denies issues with medication\par Plan: Given clinical stability and rising creatinine reduce MTX to 10mg weekly and SSZ to 500mg daily. \par Continue Plaquenil and f/u with ophtho next week\par Check labs. \par \par Office Visit 3/3/20:\par Doing well \par Following with nephrology \par No joint pain or swelling, no stiffness \par Going to PT, doing the exercises \par Plan: Condition stable on triple therapy - continue\par Has been going to PT which is helping\par Check labs. \par \par Office Visit 12/4/19:\par Last set of labs with elevated ESR/CRP and rising creatinine \par She has an appt with nephro (Dr. Robledo at [facility]) next week \par Denies joint pain, swelling or stiffness \par Shoulder pain has improved with PT\par Plan: Doing well - re check ESR/CRP today. Unlikely associated with arthritis given no clinically significant findings on history or exam to suggest a flare. \par Patient to follow up with nephrology next week. \par Continue triple therapy.\par \par Office Visit 8/19/19:\par Feeling well, shoulder improved\par Going to PT twice a week \par No joint pain \par Plan: Doing well on triple therapy. \par Continue current medication regimen. \par Continue PT \par \par Office Visit 7/15/19:\par No flares\par Doing well \par Plan: Doing well. Continue triple therapy. \par Check labs\par PT referral\par \par Office Visit 4/18/19:\par Joints feel good. Denies pain or swelling. \par Plan: Doing well \par Continue triple therapy. \par Check labs. \par \par Office Visit 1/18/19:\par Doing well, no joint pains \par No joint pain / swelling / stiffness \par Plan: Doing well at this time. \par No flares. \par Losing weight. Continue to encourage exercise. \par Continue current regimen of triple therapy. \par \par Office Visit 10/181/8:\par Feels well overall \par No symptoms today - denies swelling/stiffness in joints\par Plan: Doing well. \par Continue triple therapy. \par Check labs. \par Continue vitamin d and dietary calcium. \par \par Office Visit 7/19/18:\par Notes pain in her R shoulder, rarely in L also. Points to subacromial bursae when asked for site of pain. Not worse with any movements. She takes Hydrocodone for back pain and states this relieves her shoulder pain. At night time she will take Tylonel usually. \par States she is active, walks about twenty blocks a day. \par Overall feeling better and without complaints. \par Feels at her normal baseline functional status at this time. \par Plan: Doing well \par Continue current regimen\par Check labs for monitoring toxicity on current regimen. \par \par Office Visit 4/17/18:\par Patient states she feels well and denies any joint complaints today \par States her prior shoulder pain feels much better\par Currently has a cold\par Mild leukocytosis and stable anemia on lab work\par Markers of disease activity trending down \par No swelling or morning stiffness in any joints\par Plan: Continue triple therapy for now given remarkable improvement both clinically and serologically. \par Check labs for monitoring of disease activity and toxicities from MTX and SSZ. \par \par Office Visit 2/26/18:\par Patient with elevated ESR/CRP on last set of labs, mild translation of C spine on XR and nodulosis. No active arthritis. Discussed that we should escalate therapy in this setting however she was resistant to doing so at this time. She refuses to take injectable therapies. We discussed the possibility of adding Sulfasalzine for triple therapy and she stated she would think about it. She presents today and agrees to try this. \par Denies any joint pains / swelling / stiffness today. \par No new nodules. \par Plan: Given suboptimally controlled disease with elevated acute phase reactants, C spine translation and nodulosis will add Sulfasalazine 500mg BID for now. Patient to return in two weeks to re check CBC. \par \par Office Visit 2/8/17:\par Patient states she feels well. \par Notes ongoing pain in her trapezius muscles but not interested in trying a C Spine pillow as I think this has to do with the way she sleeps. \par Not interested in PT\par SC nodule on her elbow is resolving. \par Denies swelling or morning stiffness in any joints. \par Plan: She is currently happy with her medication regimen. Her arthritis is not active and thus will continue her current medication regimen of MTX 20mg and folic acid 2mg daily along with Plaquenil 200mg daily. \par Check CBC, CMP, ESR and CRP \par She never went to have the US of her SC nodule on her arm but is not interested in doing so. \par Given her kyphosis and neck pain, I have encourage patient to try PT and a C spine pillow to correct the way she sleeps. She is not interested in trying the pillow but will consider PT pending what is available. \par Management of back pain per spine specialist. \par \par Office Visit 11/9/17:\par High titre RF on last set of labs.\par Denies swelling or morning stiffness in any joints today. \par Overall has felt great since her epidural injections (by pain specialist) \par Has a nodular bony lesion overlying L forearm which does not bother her currently. States she has had this for many years and it is more painful during disease flares. \par Plan: \par -Patient currently on MTX 20mg and folic acid 2mg daily along with Plaquenil 200mg daily and has done well on this regimen. She has no flares, and eventually I think we can consider tapering down her MTX. \par -She has ongoing back pain but states that her pain is under control with her Fentanyl patch and hydrocodone. \par -Optho screening UTD for plaquenil toxicity \par -US nodular lesion on forearm  \par Office visit 9/7/17:\par Patient diagnosed with rheumatoid arthritis (unclear serologies) twenty years ago though she only started therapy in 2009 and has been on a stable dose of MTX and Plaquenil since. She states that she initially had swelling and morning stiffness in her MCPs and wrists. Currently she denies any flares - no swelling or morning stiffness in any of her joints. She states she has essentially been well controlled since 2014, and this is consistent with her notes from Dr. Mueller. Though I have no recent lab results or serologies. \par Patient has a herniated disc in her back which causes pain, has difficulty standing for prolonged periods and walking more than 5 blocks. She sees a pain specialist and has a Fentanyl patch as well as Hydrocodone tablets, for which she takes up to 5 a day. \par Recent evaluation for lower extremity swelling, has a history of vein ablation and sclerotherapy in bilateral lower extremities. She had been started on Lasix which has partially resolved the swelling and she had a US last week which ruled out a DVT. \par Patient fully independent with ADLs and iADLS.

## 2022-05-02 NOTE — PHYSICAL EXAM
[General Appearance - Alert] : alert [General Appearance - In No Acute Distress] : in no acute distress [Sclera] : the sclera and conjunctiva were normal [Outer Ear] : the ears and nose were normal in appearance [Respiration, Rhythm And Depth] : normal respiratory rhythm and effort [Exaggerated Use Of Accessory Muscles For Inspiration] : no accessory muscle use [Auscultation Breath Sounds / Voice Sounds] : lungs were clear to auscultation bilaterally [Heart Rate And Rhythm] : heart rate was normal and rhythm regular [Heart Sounds] : normal S1 and S2 [Abnormal Walk] : normal gait [Nail Clubbing] : no clubbing  or cyanosis of the fingernails [Musculoskeletal - Swelling] : no joint swelling seen [Motor Tone] : muscle strength and tone were normal [] : no rash [Skin Lesions] : no skin lesions [Oriented To Time, Place, And Person] : oriented to person, place, and time [FreeTextEntry1] : b/l elbow flexion deformity

## 2022-05-06 LAB
25(OH)D3 SERPL-MCNC: 60.4 NG/ML
ALBUMIN SERPL ELPH-MCNC: 4.5 G/DL
ALP BLD-CCNC: 122 U/L
ALT SERPL-CCNC: 10 U/L
ANION GAP SERPL CALC-SCNC: 13 MMOL/L
AST SERPL-CCNC: 32 U/L
BASOPHILS # BLD AUTO: 0.05 K/UL
BASOPHILS NFR BLD AUTO: 0.7 %
BILIRUB SERPL-MCNC: 0.2 MG/DL
BUN SERPL-MCNC: 19 MG/DL
CALCIUM SERPL-MCNC: 9.9 MG/DL
CALCIUM SERPL-MCNC: 9.9 MG/DL
CHLORIDE SERPL-SCNC: 105 MMOL/L
CO2 SERPL-SCNC: 22 MMOL/L
CREAT SERPL-MCNC: 1.64 MG/DL
CRP SERPL-MCNC: 12 MG/L
EGFR: 33 ML/MIN/1.73M2
EOSINOPHIL # BLD AUTO: 0.17 K/UL
EOSINOPHIL NFR BLD AUTO: 2.5 %
ERYTHROCYTE [SEDIMENTATION RATE] IN BLOOD BY WESTERGREN METHOD: 51 MM/HR
GLUCOSE SERPL-MCNC: 115 MG/DL
HCT VFR BLD CALC: 40.7 %
HGB BLD-MCNC: 11.6 G/DL
IMM GRANULOCYTES NFR BLD AUTO: 0.3 %
LYMPHOCYTES # BLD AUTO: 1.3 K/UL
LYMPHOCYTES NFR BLD AUTO: 18.8 %
MAGNESIUM SERPL-MCNC: 2 MG/DL
MAN DIFF?: NORMAL
MCHC RBC-ENTMCNC: 25.7 PG
MCHC RBC-ENTMCNC: 28.5 GM/DL
MCV RBC AUTO: 90.2 FL
MONOCYTES # BLD AUTO: 0.69 K/UL
MONOCYTES NFR BLD AUTO: 10 %
NEUTROPHILS # BLD AUTO: 4.7 K/UL
NEUTROPHILS NFR BLD AUTO: 67.7 %
PARATHYROID HORMONE INTACT: 32 PG/ML
PHOSPHATE SERPL-MCNC: 2.6 MG/DL
PLATELET # BLD AUTO: 162 K/UL
POTASSIUM SERPL-SCNC: 4.8 MMOL/L
PROT SERPL-MCNC: 7.7 G/DL
RBC # BLD: 4.51 M/UL
RBC # FLD: 16.3 %
SODIUM SERPL-SCNC: 140 MMOL/L
WBC # FLD AUTO: 6.93 K/UL

## 2022-05-13 ENCOUNTER — APPOINTMENT (OUTPATIENT)
Dept: INTERNAL MEDICINE | Facility: CLINIC | Age: 72
End: 2022-05-13
Payer: MEDICARE

## 2022-05-13 VITALS
TEMPERATURE: 98.6 F | WEIGHT: 164 LBS | OXYGEN SATURATION: 97 % | SYSTOLIC BLOOD PRESSURE: 126 MMHG | DIASTOLIC BLOOD PRESSURE: 76 MMHG | HEIGHT: 63 IN | HEART RATE: 70 BPM | BODY MASS INDEX: 29.06 KG/M2

## 2022-05-13 PROCEDURE — 99213 OFFICE O/P EST LOW 20 MIN: CPT | Mod: 25

## 2022-05-13 NOTE — HISTORY OF PRESENT ILLNESS
[FreeTextEntry1] : Interim reviewed;\par Finishing up Physical therapy\par also seeing pain management  Morphine   [de-identified] : Will start Prolia as outlined; \par Using walker to get a around;\par Has to be extra careful regarding the right hip\par Ambulated in office quite well onher own; Able to climb on examine table

## 2022-05-13 NOTE — ASSESSMENT
[FreeTextEntry1] : Exam is stable;\par Will get A!C, lipids and FS\par No change in current medication;\par

## 2022-05-17 LAB
CHOLEST SERPL-MCNC: 93 MG/DL
ESTIMATED AVERAGE GLUCOSE: 143 MG/DL
HBA1C MFR BLD HPLC: 6.6 %
HDLC SERPL-MCNC: 32 MG/DL
LDLC SERPL CALC-MCNC: 41 MG/DL
NONHDLC SERPL-MCNC: 61 MG/DL
TRIGL SERPL-MCNC: 102 MG/DL

## 2022-06-08 ENCOUNTER — APPOINTMENT (OUTPATIENT)
Dept: RHEUMATOLOGY | Facility: CLINIC | Age: 72
End: 2022-06-08
Payer: MEDICARE

## 2022-06-08 ENCOUNTER — MED ADMIN CHARGE (OUTPATIENT)
Age: 72
End: 2022-06-08

## 2022-06-08 VITALS
TEMPERATURE: 98.2 F | DIASTOLIC BLOOD PRESSURE: 77 MMHG | RESPIRATION RATE: 14 BRPM | OXYGEN SATURATION: 96 % | SYSTOLIC BLOOD PRESSURE: 153 MMHG | HEART RATE: 51 BPM

## 2022-06-08 PROCEDURE — 96372 THER/PROPH/DIAG INJ SC/IM: CPT

## 2022-06-08 RX ORDER — DENOSUMAB 60 MG/ML
60 INJECTION SUBCUTANEOUS
Qty: 1 | Refills: 0 | Status: COMPLETED | OUTPATIENT
Start: 2022-06-08

## 2022-06-13 ENCOUNTER — APPOINTMENT (OUTPATIENT)
Dept: INTERNAL MEDICINE | Facility: CLINIC | Age: 72
End: 2022-06-13
Payer: MEDICARE

## 2022-06-13 VITALS
HEIGHT: 63 IN | TEMPERATURE: 97.8 F | HEART RATE: 74 BPM | DIASTOLIC BLOOD PRESSURE: 77 MMHG | SYSTOLIC BLOOD PRESSURE: 137 MMHG | OXYGEN SATURATION: 96 % | WEIGHT: 172 LBS | BODY MASS INDEX: 30.48 KG/M2

## 2022-06-13 PROCEDURE — 99213 OFFICE O/P EST LOW 20 MIN: CPT

## 2022-06-13 NOTE — HISTORY OF PRESENT ILLNESS
[FreeTextEntry1] : Getting physical therapy re hip replacement\par No pain in hip [de-identified] : Using walker outside;\par In house no walker;\par Able to go any where

## 2022-06-13 NOTE — HEALTH RISK ASSESSMENT
[Never] : Never [No] : No [No falls in past year] : Patient reported no falls in the past year [0] : 2) Feeling down, depressed, or hopeless: Not at all (0) [[id number]] : 0

## 2022-06-29 ENCOUNTER — RESULT REVIEW (OUTPATIENT)
Age: 72
End: 2022-06-29

## 2022-06-29 ENCOUNTER — OUTPATIENT (OUTPATIENT)
Dept: OUTPATIENT SERVICES | Facility: HOSPITAL | Age: 72
LOS: 1 days | End: 2022-06-29
Payer: MEDICARE

## 2022-06-29 ENCOUNTER — APPOINTMENT (OUTPATIENT)
Dept: ORTHOPEDIC SURGERY | Facility: CLINIC | Age: 72
End: 2022-06-29
Payer: MEDICARE

## 2022-06-29 VITALS
HEIGHT: 63 IN | WEIGHT: 165 LBS | BODY MASS INDEX: 29.23 KG/M2 | DIASTOLIC BLOOD PRESSURE: 56 MMHG | SYSTOLIC BLOOD PRESSURE: 105 MMHG

## 2022-06-29 DIAGNOSIS — Z96.641 PRESENCE OF RIGHT ARTIFICIAL HIP JOINT: Chronic | ICD-10-CM

## 2022-06-29 PROCEDURE — 73502 X-RAY EXAM HIP UNI 2-3 VIEWS: CPT | Mod: 26,RT

## 2022-06-29 PROCEDURE — 73502 X-RAY EXAM HIP UNI 2-3 VIEWS: CPT

## 2022-06-29 PROCEDURE — 99213 OFFICE O/P EST LOW 20 MIN: CPT

## 2022-06-29 NOTE — PHYSICAL EXAM
[de-identified] : General appearance: well nourished and hydrated, pleasant, alert and oriented x 3, cooperative.  Cushingoid habitus.\par HEENT: normocephalic, EOM intact, wearing mask, external auditory canal clear.  \par Cardiovascular: no lower leg edema, no varicosities, dorsalis pedis pulses palpable and symmetric.  \par Lymphatics: no palpable lymphadenopathy, no lymphedema.  \par Neurologic: sensation is normal, no muscle weakness in upper or lower extremities, patella tendon reflexes present and symmetric.  \par Dermatologic: skin moist, warm, no rash.  \par Spine: cervical spine with normal lordosis and painless range of motion, thoracic spine with normal kyphosis and painless range of motion, lumbosacral spine with normal lordosis and painless range of motion.\par Gait: cautious but not antalgic right, using rollator.\par \par Right hip: all fields negative/normal/unremarkable unless otherwise noted --\par - Focal soft tissue swelling: \par - Ecchymosis: \par - Erythema: \par - Wounds: healed posterolateral incision\par - Tenderness: \par - ROM: \par   - Flexion: 90\par   - Extension: 0\par   - Adduction: 30\par   - Abduction: 20\par   - Internal rotation in 90 degrees of hip flexion: deferred\par   - External rotation in 90 degrees of hip flexion: 20\par - REDD: uncomfortable\par - FADIR: deferred\par - Malu: negative\par - Stinchfield: \par - Flexor power: 5/5\par - Abductor power: 5/5\par \par Limb lengths: R 6-8 mm longer than the left [de-identified] : Weightbearing AP pelvis, and 2 additional views (frog lateral and false profile) of the right hip were interpreted by me and reviewed with the patient.\par \par Location of imaging: [facility]\par Date of exam: 6/29/22\par \par Right hip -- ROSITA in position. The femoral head is concentrically reduced within the acetabular component. Acetabular component appears to be well fixed in acceptable position. The femoral component subsidence has not progressed as compared to most recent films. There is subtle progression of the heterotopic ossification present along the superior acetabulum. \par \par Left hip --\par Arthritis: minimal\par Deformity: cam\par Osteonecrosis: none

## 2022-06-29 NOTE — DISCUSSION/SUMMARY
[de-identified] : 72y/o female s/p R ROSITA with 2x early dislocations\par - Cont PT\par - Recommended that she maintain posterior hip precautions for life\par - HEP\par - We reviewed that any further dislocation will merit a component revision\par - Follow up with me in January with repeat right hip XRs or earlier as needed

## 2022-07-11 ENCOUNTER — RESULT REVIEW (OUTPATIENT)
Age: 72
End: 2022-07-11

## 2022-07-11 ENCOUNTER — APPOINTMENT (OUTPATIENT)
Dept: RHEUMATOLOGY | Facility: CLINIC | Age: 72
End: 2022-07-11

## 2022-07-11 VITALS
WEIGHT: 165 LBS | SYSTOLIC BLOOD PRESSURE: 143 MMHG | HEART RATE: 56 BPM | OXYGEN SATURATION: 96 % | DIASTOLIC BLOOD PRESSURE: 73 MMHG | BODY MASS INDEX: 29.23 KG/M2 | HEIGHT: 63 IN | RESPIRATION RATE: 16 BRPM

## 2022-07-11 PROCEDURE — 99214 OFFICE O/P EST MOD 30 MIN: CPT | Mod: 25

## 2022-07-11 PROCEDURE — 36415 COLL VENOUS BLD VENIPUNCTURE: CPT

## 2022-07-11 NOTE — REVIEW OF SYSTEMS
[As Noted in HPI] : as noted in HPI [Arthralgias] : arthralgias [Joint Pain] : joint pain [Fever] : no fever [Chills] : no chills [Eye Pain] : no eye pain [Red Eyes] : eyes not red [Dry Eyes] : no dryness of the eyes [Nasal Discharge] : no nasal discharge [Sore Throat] : no sore throat [Chest Pain] : no chest pain [Palpitations] : no palpitations [Leg Claudication] : no intermittent leg claudication [Lower Ext Edema] : no lower extremity edema [Shortness Of Breath] : no shortness of breath [Wheezing] : no wheezing [Cough] : no cough [SOB on Exertion] : no shortness of breath during exertion [Abdominal Pain] : no abdominal pain [Vomiting] : no vomiting [Constipation] : no constipation [Diarrhea] : no diarrhea [Dysuria] : no dysuria [Incontinence] : no incontinence [Joint Swelling] : no joint swelling [Joint Stiffness] : no joint stiffness [Skin Lesions] : no skin lesions [Dizziness] : no dizziness [Fainting] : no fainting [Anxiety] : no anxiety [Depression] : no depression [Easy Bleeding] : no tendency for easy bleeding [Easy Bruising] : no tendency for easy bruising [Swollen Glands In The Neck] : no swollen glands in the neck [Swollen Glands] : no swollen glands

## 2022-07-11 NOTE — ASSESSMENT
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   RA diagnosed since 1993. Treated with MTX but stopped due to worsening CKD since January 2021\par Osteopenia on DXA 2017 and repeat DEXA 07/2021 Osteoporosis . \par Has chronic L spine DJD for which follows with pain management and takes Morphine daily \par Overall thinks RA is well controlled and no flares, but vectra with high disease activity 48  \par \par \par 1. Seropositive RA: has chronic joint deformities. \par Continue to hold off MTX in the setting of CKD stage 4 \par Continue Plaquenil 200mg BID( eye exam up to date, no maculopathy) \par c/w  SSZ 1g BID. \par advised to have Xray hand and wrist and one found from 2008. \par If erosive changes will have a discussion about escalating  b DMARD treatment. \par \par 2. Bone health- Osteoporosis on DXA done on 07/24/21 with R hip fracture,  total hip arthroplasty at [facility] on 1/24/22,  right hip dislocation twice managed by closed reduction. \par She has been declining OP treatment in the past but then agreed to start prolia and given first dose in 6/8/22\par Denosumab is given in the clinic at a dose of 60 mg subcutaneously (SC) every 6 months \par Will check f/u labs today \par Blood drawn during the visit today.\par \par Patient taking Calcium 1200 mg daily from diet and since Vit D level is 60, advised to hold off daily supplement for next few months and resume from fall. \par Will repeat DXA around summer, 2023. \par \par \par f/u 4  months

## 2022-07-11 NOTE — PHYSICAL EXAM
[General Appearance - Alert] : alert [General Appearance - In No Acute Distress] : in no acute distress [Sclera] : the sclera and conjunctiva were normal [Outer Ear] : the ears and nose were normal in appearance [Respiration, Rhythm And Depth] : normal respiratory rhythm and effort [Exaggerated Use Of Accessory Muscles For Inspiration] : no accessory muscle use [Auscultation Breath Sounds / Voice Sounds] : lungs were clear to auscultation bilaterally [Heart Rate And Rhythm] : heart rate was normal and rhythm regular [Heart Sounds] : normal S1 and S2 [Abnormal Walk] : normal gait [Nail Clubbing] : no clubbing  or cyanosis of the fingernails [Musculoskeletal - Swelling] : no joint swelling seen [Motor Tone] : muscle strength and tone were normal [] : no rash [Skin Lesions] : no skin lesions [Oriented To Time, Place, And Person] : oriented to person, place, and time [FreeTextEntry1] : b/l elbow flexion deformity, left wrist fused

## 2022-07-11 NOTE — HISTORY OF PRESENT ILLNESS
[___ Month(s) Ago] : [unfilled] month(s) ago [FreeTextEntry1] : Office Visit 12/22/20:\par Was taking SSZ only once per day, just increased to twice daily. \par Creatine level is stable but still 1.75 Nov 2020 \par Denies any joint pain, swelling or stiffness today \par Plan: Doing well overall \par No flares / RA stable \par Continue to monitor renal function on triple therapy - MTX dose has already been lowered in light of this\par Check labs\par \par Office Visit 9/24/20:\par Worsening of creatinine - seeing Dr. Burgess at [facility]. Most recent 1.9 (patient reported, no records) \par Last visit reduced MTX to 4mg weekly and SSZ to 500mg daily. She notes diffuse body aches since in her joints > muscles. Most severe in her shoulders, left knee and left wrist. No swelling, am stiffness about 20min. Slower with doing things but denies any significant limitation of function. \par Denies lower back pain. Remains on a stable dose of morphine. \par Plan: Attempt to taper off triple therapy, however she notes worsening joint pain. No synovitis on exam today, and thus potentially not inflammatory in etiology however she would like to increase some of the medications as she clearly notes a symptomatic change. \par Given worsening renal function will keep MTX to 10mg weekly and increase SSZ back to 1g daily. Continue Plaquenil. \par Check labs \par PRN Voltaren gel for knee pain. \par \par Office Visit 6/22/20:\par COVID IgG positive\par No joint pain \par Denies issues with medication\par Plan: Given clinical stability and rising creatinine reduce MTX to 10mg weekly and SSZ to 500mg daily. \par Continue Plaquenil and f/u with ophtho next week\par Check labs. \par \par Office Visit 3/3/20:\par Doing well \par Following with nephrology \par No joint pain or swelling, no stiffness \par Going to PT, doing the exercises \par Plan: Condition stable on triple therapy - continue\par Has been going to PT which is helping\par Check labs. \par \par Office Visit 12/4/19:\par Last set of labs with elevated ESR/CRP and rising creatinine \par She has an appt with nephro (Dr. Robledo at [facility]) next week \par Denies joint pain, swelling or stiffness \par Shoulder pain has improved with PT\par Plan: Doing well - re check ESR/CRP today. Unlikely associated with arthritis given no clinically significant findings on history or exam to suggest a flare. \par Patient to follow up with nephrology next week. \par Continue triple therapy.\par \par Office Visit 8/19/19:\par Feeling well, shoulder improved\par Going to PT twice a week \par No joint pain \par Plan: Doing well on triple therapy. \par Continue current medication regimen. \par Continue PT \par \par Office Visit 7/15/19:\par No flares\par Doing well \par Plan: Doing well. Continue triple therapy. \par Check labs\par PT referral\par \par Office Visit 4/18/19:\par Joints feel good. Denies pain or swelling. \par Plan: Doing well \par Continue triple therapy. \par Check labs. \par \par Office Visit 1/18/19:\par Doing well, no joint pains \par No joint pain / swelling / stiffness \par Plan: Doing well at this time. \par No flares. \par Losing weight. Continue to encourage exercise. \par Continue current regimen of triple therapy. \par \par Office Visit 10/181/8:\par Feels well overall \par No symptoms today - denies swelling/stiffness in joints\par Plan: Doing well. \par Continue triple therapy. \par Check labs. \par Continue vitamin d and dietary calcium. \par \par Office Visit 7/19/18:\par Notes pain in her R shoulder, rarely in L also. Points to subacromial bursae when asked for site of pain. Not worse with any movements. She takes Hydrocodone for back pain and states this relieves her shoulder pain. At night time she will take Tylonel usually. \par States she is active, walks about twenty blocks a day. \par Overall feeling better and without complaints. \par Feels at her normal baseline functional status at this time. \par Plan: Doing well \par Continue current regimen\par Check labs for monitoring toxicity on current regimen. \par \par Office Visit 4/17/18:\par Patient states she feels well and denies any joint complaints today \par States her prior shoulder pain feels much better\par Currently has a cold\par Mild leukocytosis and stable anemia on lab work\par Markers of disease activity trending down \par No swelling or morning stiffness in any joints\par Plan: Continue triple therapy for now given remarkable improvement both clinically and serologically. \par Check labs for monitoring of disease activity and toxicities from MTX and SSZ. \par \par Office Visit 2/26/18:\par Patient with elevated ESR/CRP on last set of labs, mild translation of C spine on XR and nodulosis. No active arthritis. Discussed that we should escalate therapy in this setting however she was resistant to doing so at this time. She refuses to take injectable therapies. We discussed the possibility of adding Sulfasalzine for triple therapy and she stated she would think about it. She presents today and agrees to try this. \par Denies any joint pains / swelling / stiffness today. \par No new nodules. \par Plan: Given suboptimally controlled disease with elevated acute phase reactants, C spine translation and nodulosis will add Sulfasalazine 500mg BID for now. Patient to return in two weeks to re check CBC. \par \par Office Visit 2/8/17:\par Patient states she feels well. \par Notes ongoing pain in her trapezius muscles but not interested in trying a C Spine pillow as I think this has to do with the way she sleeps. \par Not interested in PT\par SC nodule on her elbow is resolving. \par Denies swelling or morning stiffness in any joints. \par Plan: She is currently happy with her medication regimen. Her arthritis is not active and thus will continue her current medication regimen of MTX 20mg and folic acid 2mg daily along with Plaquenil 200mg daily. \par Check CBC, CMP, ESR and CRP \par She never went to have the US of her SC nodule on her arm but is not interested in doing so. \par Given her kyphosis and neck pain, I have encourage patient to try PT and a C spine pillow to correct the way she sleeps. She is not interested in trying the pillow but will consider PT pending what is available. \par Management of back pain per spine specialist. \par \par Office Visit 11/9/17:\par High titre RF on last set of labs.\par Denies swelling or morning stiffness in any joints today. \par Overall has felt great since her epidural injections (by pain specialist) \par Has a nodular bony lesion overlying L forearm which does not bother her currently. States she has had this for many years and it is more painful during disease flares. \par Plan: \par -Patient currently on MTX 20mg and folic acid 2mg daily along with Plaquenil 200mg daily and has done well on this regimen. She has no flares, and eventually I think we can consider tapering down her MTX. \par -She has ongoing back pain but states that her pain is under control with her Fentanyl patch and hydrocodone. \par -Optho screening UTD for plaquenil toxicity \par -US nodular lesion on forearm  \par Office visit 9/7/17:\par Patient diagnosed with rheumatoid arthritis (unclear serologies) twenty years ago though she only started therapy in 2009 and has been on a stable dose of MTX and Plaquenil since. She states that she initially had swelling and morning stiffness in her MCPs and wrists. Currently she denies any flares - no swelling or morning stiffness in any of her joints. She states she has essentially been well controlled since 2014, and this is consistent with her notes from Dr. Mueller. Though I have no recent lab results or serologies. \par Patient has a herniated disc in her back which causes pain, has difficulty standing for prolonged periods and walking more than 5 blocks. She sees a pain specialist and has a Fentanyl patch as well as Hydrocodone tablets, for which she takes up to 5 a day. \par Recent evaluation for lower extremity swelling, has a history of vein ablation and sclerotherapy in bilateral lower extremities. She had been started on Lasix which has partially resolved the swelling and she had a US last week which ruled out a DVT. \par Patient fully independent with ADLs and iADLS.

## 2022-07-12 LAB
ALBUMIN SERPL ELPH-MCNC: 4.5 G/DL
ALP BLD-CCNC: 77 U/L
ALT SERPL-CCNC: 6 U/L
ANION GAP SERPL CALC-SCNC: 15 MMOL/L
AST SERPL-CCNC: 16 U/L
BASOPHILS # BLD AUTO: 0.07 K/UL
BASOPHILS NFR BLD AUTO: 0.9 %
BILIRUB SERPL-MCNC: 0.2 MG/DL
BUN SERPL-MCNC: 26 MG/DL
CALCIUM SERPL-MCNC: 9.5 MG/DL
CHLORIDE SERPL-SCNC: 106 MMOL/L
CO2 SERPL-SCNC: 17 MMOL/L
CREAT SERPL-MCNC: 1.56 MG/DL
CRP SERPL-MCNC: <3 MG/L
EGFR: 35 ML/MIN/1.73M2
EOSINOPHIL # BLD AUTO: 0.12 K/UL
EOSINOPHIL NFR BLD AUTO: 1.6 %
ERYTHROCYTE [SEDIMENTATION RATE] IN BLOOD BY WESTERGREN METHOD: 54 MM/HR
GLUCOSE SERPL-MCNC: 138 MG/DL
HCT VFR BLD CALC: 38 %
HGB BLD-MCNC: 11.9 G/DL
IMM GRANULOCYTES NFR BLD AUTO: 0.3 %
LYMPHOCYTES # BLD AUTO: 1.63 K/UL
LYMPHOCYTES NFR BLD AUTO: 21.8 %
MAN DIFF?: NORMAL
MCHC RBC-ENTMCNC: 26.7 PG
MCHC RBC-ENTMCNC: 31.3 GM/DL
MCV RBC AUTO: 85.2 FL
MONOCYTES # BLD AUTO: 0.56 K/UL
MONOCYTES NFR BLD AUTO: 7.5 %
NEUTROPHILS # BLD AUTO: 5.07 K/UL
NEUTROPHILS NFR BLD AUTO: 67.9 %
PLATELET # BLD AUTO: 226 K/UL
POTASSIUM SERPL-SCNC: 5.1 MMOL/L
PROT SERPL-MCNC: 7.4 G/DL
RBC # BLD: 4.46 M/UL
RBC # FLD: 17.8 %
SODIUM SERPL-SCNC: 138 MMOL/L
WBC # FLD AUTO: 7.47 K/UL

## 2022-07-28 RX ORDER — FOLIC ACID 1 MG/1
1 TABLET ORAL DAILY
Qty: 30 | Refills: 5 | Status: ACTIVE | COMMUNITY
Start: 2022-04-21 | End: 1900-01-01

## 2022-07-31 NOTE — H&P ADULT - PROBLEM/PLAN-1
DISPLAY PLAN FREE TEXT 6 = Severely ill - disruptive pathology, behavior and function are frequently influenced by symptoms, may require assistance from others

## 2022-08-04 ENCOUNTER — APPOINTMENT (OUTPATIENT)
Dept: HEART AND VASCULAR | Facility: CLINIC | Age: 72
End: 2022-08-04

## 2022-08-04 ENCOUNTER — NON-APPOINTMENT (OUTPATIENT)
Age: 72
End: 2022-08-04

## 2022-08-04 VITALS
SYSTOLIC BLOOD PRESSURE: 126 MMHG | HEIGHT: 63 IN | TEMPERATURE: 97 F | HEART RATE: 50 BPM | BODY MASS INDEX: 29.23 KG/M2 | OXYGEN SATURATION: 97 % | WEIGHT: 165 LBS | DIASTOLIC BLOOD PRESSURE: 67 MMHG

## 2022-08-04 PROCEDURE — 99213 OFFICE O/P EST LOW 20 MIN: CPT | Mod: 25

## 2022-08-04 PROCEDURE — 93000 ELECTROCARDIOGRAM COMPLETE: CPT

## 2022-08-04 RX ORDER — BLOOD-GLUCOSE METER
EACH MISCELLANEOUS
Qty: 1 | Refills: 0 | Status: DISCONTINUED | COMMUNITY
Start: 2017-06-08 | End: 2022-08-04

## 2022-08-04 RX ORDER — MORPHINE SULFATE 30 MG/1
30 TABLET ORAL DAILY
Refills: 0 | Status: DISCONTINUED | COMMUNITY
Start: 2020-06-04 | End: 2022-08-04

## 2022-08-04 RX ORDER — ASPIRIN 81 MG/1
81 TABLET, DELAYED RELEASE ORAL TWICE DAILY
Refills: 0 | Status: DISCONTINUED | COMMUNITY
Start: 2022-04-21 | End: 2022-08-04

## 2022-08-04 RX ORDER — PEN NEEDLE, DIABETIC 31 GX5/16"
31G X 8 MM NEEDLE, DISPOSABLE MISCELLANEOUS
Qty: 100 | Refills: 0 | Status: DISCONTINUED | COMMUNITY
Start: 2017-07-11 | End: 2022-08-04

## 2022-08-04 RX ORDER — INSULIN DETEMIR 100 [IU]/ML
100 INJECTION, SOLUTION SUBCUTANEOUS
Qty: 15 | Refills: 0 | Status: DISCONTINUED | COMMUNITY
Start: 2021-02-16 | End: 2022-08-04

## 2022-08-04 NOTE — REASON FOR VISIT
[FreeTextEntry1] : Routine follow up.\par Feels generally well. Making progress in terms of her physical activity, walking several times per week, and weaning herself off of use of walker (following prior R hip injury).\par Monitoring BP at home on a daily basis. She discontinued her hydralazine on recommendation of nephrologist (BP <120 mmHg), and BP still under good control, with at home readings of about 135 mmHg systolic in the AM and <120 mmHg systolic  in PM.\par A1c down to 6.6%\par Creatinine improved to 1.51 mg/dl\par No chest pain, lightheadedness, or YOUNG

## 2022-08-04 NOTE — PHYSICAL EXAM
[Normal] : well developed, well nourished, no acute distress [Normal Venous Pressure] : normal venous pressure [Normal S1, S2] : normal S1, S2 [No Edema] : no edema [de-identified] : slow regular rhythm. 2-3/3 BRICE at base

## 2022-08-08 ENCOUNTER — OUTPATIENT (OUTPATIENT)
Dept: OUTPATIENT SERVICES | Facility: HOSPITAL | Age: 72
LOS: 1 days | End: 2022-08-08
Payer: MEDICARE

## 2022-08-08 ENCOUNTER — RESULT REVIEW (OUTPATIENT)
Age: 72
End: 2022-08-08

## 2022-08-08 DIAGNOSIS — Z96.641 PRESENCE OF RIGHT ARTIFICIAL HIP JOINT: Chronic | ICD-10-CM

## 2022-08-08 PROCEDURE — 73110 X-RAY EXAM OF WRIST: CPT | Mod: 26,LT,RT

## 2022-08-08 PROCEDURE — 73110 X-RAY EXAM OF WRIST: CPT

## 2022-08-08 PROCEDURE — 73130 X-RAY EXAM OF HAND: CPT | Mod: 26,LT,RT

## 2022-08-08 PROCEDURE — 73130 X-RAY EXAM OF HAND: CPT

## 2022-09-12 ENCOUNTER — APPOINTMENT (OUTPATIENT)
Dept: INTERNAL MEDICINE | Facility: CLINIC | Age: 72
End: 2022-09-12

## 2022-09-12 VITALS
RESPIRATION RATE: 14 BRPM | DIASTOLIC BLOOD PRESSURE: 80 MMHG | TEMPERATURE: 97.9 F | BODY MASS INDEX: 29.23 KG/M2 | OXYGEN SATURATION: 99 % | HEIGHT: 63 IN | HEART RATE: 81 BPM | WEIGHT: 165 LBS | SYSTOLIC BLOOD PRESSURE: 132 MMHG

## 2022-09-12 DIAGNOSIS — Z23 ENCOUNTER FOR IMMUNIZATION: ICD-10-CM

## 2022-09-12 PROCEDURE — G0008: CPT

## 2022-09-12 PROCEDURE — 90662 IIV NO PRSV INCREASED AG IM: CPT

## 2022-09-12 PROCEDURE — 99213 OFFICE O/P EST LOW 20 MIN: CPT | Mod: 25

## 2022-09-12 NOTE — HISTORY OF PRESENT ILLNESS
[FreeTextEntry1] : Not using walker any more;\par medication changes noted  [de-identified] : Gets Prolia and do for another injection December

## 2022-09-12 NOTE — HEALTH RISK ASSESSMENT
[Never] : Never [No] : No [0] : 2) Feeling down, depressed, or hopeless: Not at all (0) [DTH2Voeji] : 0

## 2022-10-17 ENCOUNTER — APPOINTMENT (OUTPATIENT)
Dept: RHEUMATOLOGY | Facility: CLINIC | Age: 72
End: 2022-10-17

## 2022-10-17 VITALS
OXYGEN SATURATION: 96 % | DIASTOLIC BLOOD PRESSURE: 83 MMHG | HEIGHT: 63 IN | TEMPERATURE: 97.9 F | HEART RATE: 55 BPM | SYSTOLIC BLOOD PRESSURE: 134 MMHG | WEIGHT: 164.56 LBS | BODY MASS INDEX: 29.16 KG/M2

## 2022-10-17 PROCEDURE — 99214 OFFICE O/P EST MOD 30 MIN: CPT | Mod: 25

## 2022-10-17 PROCEDURE — 36415 COLL VENOUS BLD VENIPUNCTURE: CPT

## 2022-10-17 RX ORDER — HYDROXYCHLOROQUINE SULFATE 200 MG/1
200 TABLET, FILM COATED ORAL
Qty: 180 | Refills: 2 | Status: ACTIVE | COMMUNITY
Start: 2020-04-28 | End: 1900-01-01

## 2022-10-17 NOTE — REVIEW OF SYSTEMS
[Arthralgias] : arthralgias [Negative] : Heme/Lymph [FreeTextEntry9] : Left hand, left elbow, left shoulder

## 2022-10-17 NOTE — HISTORY OF PRESENT ILLNESS
[FreeTextEntry1] : October 17, 2022\par Diagnosed at the age of 20\par Patient with +RF and +CCP\par SSZ 1 gm bid\par Plaquenil 200 mg bid\par Previously took methotrexate until creatinine increased\par Took MTX for a long time, about 10 years\par Feels about the same without methotrexate, but maybe a little better\par Eye doctor every six months, no problems with Plaquenil\par Xrays in August 2022, reviewed with patient.\par Tries not to take medications for the pain given kidney function\par Aleve spray as needed\par Hip fracture right 1/2022, total hip replacement\par Started prolia June 2022, follow up in December 7, 2022\par had fall again on right hip, no fracture\par No PT for present, stopped using walker as does not need\par Uses access a ride for transportation\par Left hand dominant\par

## 2022-10-17 NOTE — ASSESSMENT
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 RA diagnosed since 1993. Treated with MTX but stopped due to worsening CKD since January 2021, now continue sulfasalazine 1 g twice daily and hydroxychloroquine 200 mg twice daily.  Patient with some pain on the left upper extremity predominantly in the wrist and elbow, but manageable.  Overall thinks RA is well controlled and no flares, but vectra (May 2021) with high disease activity 48. Patient does not always remember p.m. dose of sulfasalazine, but does take at least 1 gm daily.  Patient sees ophthalmologist every 6 months, up-to-date, no evidence of Plaquenil toxicity.  History of osteoporosis,repeat DEXA 07/2021 with osteoporosis of the femoral neck, with history of right hip fracture, status post total hip replacement in January 2022.  Started on Prolia in June 2022, next dose due December 2022.  No jaw pain noted or pending dental work.  Continue calcium and vitamin D supplementation.  Continue weightbearing exercises as tolerated, fall precautions discussed.  History of chronic L spine DJD for which follows with pain management and takes Morphine daily.  Will update labs today in office, including CBC, CMP, ESR, and CRP.  Will follow-up in 3 months or sooner as needed.  Patient had flu vaccine in September 2022.\par \par \par \par \par \par

## 2022-10-17 NOTE — PHYSICAL EXAM
[General Appearance - Alert] : alert [General Appearance - In No Acute Distress] : in no acute distress [General Appearance - Well-Appearing] : healthy appearing [Sclera] : the sclera and conjunctiva were normal [] : no respiratory distress [Respiration, Rhythm And Depth] : normal respiratory rhythm and effort [Exaggerated Use Of Accessory Muscles For Inspiration] : no accessory muscle use [Abnormal Walk] : normal gait [FreeTextEntry1] : Enlargement of the left ulnar styloid, no tenderness.  Decreased handgrip on the left.  No pain with range of motion of the left wrist.  Decreased full extension of the left elbow.  Decreased range of motion of bilateral shoulders at extremes of rotation.

## 2022-10-17 NOTE — DATA REVIEWED
[FreeTextEntry1] : Bone density July 24, 2021\par Lumbar spine T score 2.1\par Left femoral neck T score -2.5\par Left total hip T score -1.1

## 2022-10-21 LAB
ALBUMIN SERPL ELPH-MCNC: 4.5 G/DL
ALP BLD-CCNC: 163 U/L
ALT SERPL-CCNC: 11 U/L
ANION GAP SERPL CALC-SCNC: 10 MMOL/L
AST SERPL-CCNC: 22 U/L
BASOPHILS # BLD AUTO: 0.07 K/UL
BASOPHILS NFR BLD AUTO: 1 %
BILIRUB SERPL-MCNC: 0.2 MG/DL
BUN SERPL-MCNC: 23 MG/DL
CALCIUM SERPL-MCNC: 10.4 MG/DL
CHLORIDE SERPL-SCNC: 105 MMOL/L
CO2 SERPL-SCNC: 25 MMOL/L
CREAT SERPL-MCNC: 1.8 MG/DL
CRP SERPL-MCNC: 3 MG/L
EGFR: 29 ML/MIN/1.73M2
EOSINOPHIL # BLD AUTO: 0.23 K/UL
EOSINOPHIL NFR BLD AUTO: 3.4 %
ERYTHROCYTE [SEDIMENTATION RATE] IN BLOOD BY WESTERGREN METHOD: 20 MM/HR
GLUCOSE SERPL-MCNC: 115 MG/DL
HCT VFR BLD CALC: 42.2 %
HGB BLD-MCNC: 12.6 G/DL
IMM GRANULOCYTES NFR BLD AUTO: 0.4 %
LYMPHOCYTES # BLD AUTO: 1.49 K/UL
LYMPHOCYTES NFR BLD AUTO: 22.3 %
MAN DIFF?: NORMAL
MCHC RBC-ENTMCNC: 28.5 PG
MCHC RBC-ENTMCNC: 29.9 GM/DL
MCV RBC AUTO: 95.5 FL
MONOCYTES # BLD AUTO: 0.49 K/UL
MONOCYTES NFR BLD AUTO: 7.3 %
NEUTROPHILS # BLD AUTO: 4.38 K/UL
NEUTROPHILS NFR BLD AUTO: 65.6 %
PLATELET # BLD AUTO: 198 K/UL
POTASSIUM SERPL-SCNC: 4.8 MMOL/L
PROT SERPL-MCNC: 7.4 G/DL
RBC # BLD: 4.42 M/UL
RBC # FLD: 13.8 %
SODIUM SERPL-SCNC: 140 MMOL/L
WBC # FLD AUTO: 6.69 K/UL

## 2022-11-03 ENCOUNTER — NON-APPOINTMENT (OUTPATIENT)
Age: 72
End: 2022-11-03

## 2022-11-03 ENCOUNTER — APPOINTMENT (OUTPATIENT)
Dept: HEART AND VASCULAR | Facility: CLINIC | Age: 72
End: 2022-11-03

## 2022-11-03 VITALS
DIASTOLIC BLOOD PRESSURE: 68 MMHG | OXYGEN SATURATION: 96 % | SYSTOLIC BLOOD PRESSURE: 144 MMHG | BODY MASS INDEX: 29.23 KG/M2 | HEIGHT: 63 IN | TEMPERATURE: 97.8 F | HEART RATE: 57 BPM | WEIGHT: 164.99 LBS

## 2022-11-03 PROCEDURE — 93000 ELECTROCARDIOGRAM COMPLETE: CPT

## 2022-11-03 PROCEDURE — 99213 OFFICE O/P EST LOW 20 MIN: CPT | Mod: 25

## 2022-11-03 RX ORDER — MULTIVITAMIN
TABLET ORAL
Qty: 30 | Refills: 0 | Status: ACTIVE | COMMUNITY
Start: 2022-02-14

## 2022-11-03 NOTE — PHYSICAL EXAM
[No Acute Distress] : no acute distress [Normal Venous Pressure] : normal venous pressure [Clear Lung Fields] : clear lung fields [No Edema] : no edema [de-identified] : 2/6 BRICE at base

## 2022-11-03 NOTE — REASON FOR VISIT
[FreeTextEntry1] : Feeling generally well. Continues to make progress from recent hip fx. Now able to walk without assist.\par BP at home under good control, with systolics 115-140 mmHg and diastolic pressure consistently below 80 mmHg.\par No change in weight.\par Reviewed recent labs, including creatinine (1.8 mg/dl). Most recent lipid panel done in May.\par Received her flu shot and COVID booster.

## 2022-11-10 ENCOUNTER — APPOINTMENT (OUTPATIENT)
Dept: RHEUMATOLOGY | Facility: CLINIC | Age: 72
End: 2022-11-10

## 2022-11-14 ENCOUNTER — APPOINTMENT (OUTPATIENT)
Dept: INTERNAL MEDICINE | Facility: CLINIC | Age: 72
End: 2022-11-14

## 2022-11-14 VITALS
HEART RATE: 50 BPM | BODY MASS INDEX: 28.88 KG/M2 | OXYGEN SATURATION: 98 % | DIASTOLIC BLOOD PRESSURE: 68 MMHG | TEMPERATURE: 98 F | HEIGHT: 63 IN | SYSTOLIC BLOOD PRESSURE: 131 MMHG | WEIGHT: 163 LBS

## 2022-11-14 PROCEDURE — 36415 COLL VENOUS BLD VENIPUNCTURE: CPT

## 2022-11-14 PROCEDURE — 99213 OFFICE O/P EST LOW 20 MIN: CPT | Mod: 25

## 2022-11-14 RX ORDER — MORPHINE SULFATE 15 MG/1
15 TABLET ORAL
Qty: 14 | Refills: 0 | Status: DISCONTINUED | COMMUNITY
Start: 2022-05-25 | End: 2022-11-14

## 2022-11-14 NOTE — ASSESSMENT
[FreeTextEntry1] : Exam is stable;\par Will repeat A1C \par Also Renal function;\par She is followed by numerous consultants\par No change in medication for now

## 2022-11-14 NOTE — HISTORY OF PRESENT ILLNESS
[FreeTextEntry1] : Interim reviewed;\par No chief complaint;\par Saw Dr Casarez and all okanay [de-identified] : Not taking Insulin;\par Just PO medication\par Other medication reviewed;\par Being followed by Renal  at [place] \par Got Flu vaccine and Covid vaccine

## 2022-11-22 LAB
ALBUMIN SERPL ELPH-MCNC: 4.6 G/DL
ALP BLD-CCNC: 184 U/L
ALT SERPL-CCNC: 81 U/L
ANION GAP SERPL CALC-SCNC: 13 MMOL/L
AST SERPL-CCNC: 69 U/L
BILIRUB SERPL-MCNC: 0.3 MG/DL
BUN SERPL-MCNC: 23 MG/DL
CALCIUM SERPL-MCNC: 10.2 MG/DL
CHLORIDE SERPL-SCNC: 103 MMOL/L
CO2 SERPL-SCNC: 23 MMOL/L
CREAT SERPL-MCNC: 1.5 MG/DL
EGFR: 37 ML/MIN/1.73M2
ESTIMATED AVERAGE GLUCOSE: 143 MG/DL
GLUCOSE SERPL-MCNC: 85 MG/DL
HBA1C MFR BLD HPLC: 6.6 %
POTASSIUM SERPL-SCNC: 4.4 MMOL/L
PROT SERPL-MCNC: 7.9 G/DL
SODIUM SERPL-SCNC: 139 MMOL/L

## 2022-12-07 ENCOUNTER — APPOINTMENT (OUTPATIENT)
Dept: RHEUMATOLOGY | Facility: CLINIC | Age: 72
End: 2022-12-07

## 2022-12-07 VITALS
OXYGEN SATURATION: 93 % | HEART RATE: 54 BPM | SYSTOLIC BLOOD PRESSURE: 130 MMHG | DIASTOLIC BLOOD PRESSURE: 73 MMHG | RESPIRATION RATE: 14 BRPM | TEMPERATURE: 98 F

## 2022-12-07 PROCEDURE — 96372 THER/PROPH/DIAG INJ SC/IM: CPT

## 2022-12-07 RX ORDER — DENOSUMAB 60 MG/ML
60 INJECTION SUBCUTANEOUS
Qty: 1 | Refills: 0 | Status: COMPLETED | OUTPATIENT
Start: 2022-12-06

## 2023-01-17 ENCOUNTER — APPOINTMENT (OUTPATIENT)
Dept: RHEUMATOLOGY | Facility: CLINIC | Age: 73
End: 2023-01-17
Payer: MEDICARE

## 2023-01-17 VITALS
TEMPERATURE: 98.3 F | DIASTOLIC BLOOD PRESSURE: 80 MMHG | HEART RATE: 72 BPM | WEIGHT: 161 LBS | SYSTOLIC BLOOD PRESSURE: 135 MMHG | HEIGHT: 63 IN | BODY MASS INDEX: 28.53 KG/M2 | OXYGEN SATURATION: 96 %

## 2023-01-17 PROCEDURE — 99214 OFFICE O/P EST MOD 30 MIN: CPT | Mod: 25

## 2023-01-17 NOTE — HISTORY OF PRESENT ILLNESS
[FreeTextEntry1] : January 17, 2023\par Patient returns for follow up, RA with  + RF and +CCP\par Feeling well\par Continue SSZ 1 gm bid\par Plaquenil 200 mg bid\par Folic acid 1 mg qday\par Would like refills for medications\par No new medications reported\par Reviewed previous labs from November 14, 2020 with elevated liver enzymes, will repeat today\par Had prolia in 12/7/2022, no side effects noted\par Walks about every other day to the store\par No cane or walker at this time, feels steady on feet, will start walking exercise routine\par Trying to modify diet for anti-inflammatory\par Last ophthalmology 12/2022, has follow up in July 2023\par \par October 17, 2022\par Diagnosed at the age of 20\par Patient with +RF and +CCP\par SSZ 1 gm bid\par Plaquenil 200 mg bid\par Previously took methotrexate until creatinine increased\par Took MTX for a long time, about 10 years\par Feels about the same without methotrexate, but maybe a little better\par Eye doctor every six months, no problems with Plaquenil\par Xrays in August 2022, reviewed with patient.\par Tries not to take medications for the pain given kidney function\par Aleve spray as needed\par Hip fracture right 1/2022, total hip replacement\par Started prolia June 2022, follow up in December 7, 2022\par had fall again on right hip, no fracture\par No PT for present, stopped using walker as does not need\par Uses access a ride for transportation\par Left hand dominant\par

## 2023-01-17 NOTE — PHYSICAL EXAM
[General Appearance - Alert] : alert [General Appearance - In No Acute Distress] : in no acute distress [General Appearance - Well-Appearing] : healthy appearing [Sclera] : the sclera and conjunctiva were normal [Respiration, Rhythm And Depth] : normal respiratory rhythm and effort [Exaggerated Use Of Accessory Muscles For Inspiration] : no accessory muscle use [Oriented To Time, Place, And Person] : oriented to person, place, and time [Impaired Insight] : insight and judgment were intact [Affect] : the affect was normal [Mood] : the mood was normal [Abnormal Walk] : normal gait [] : no rash [FreeTextEntry1] : Enlargement of the left ulnar styloid, no tenderness.  Improved handgrip on the left.  No pain with range of motion of the left wrist.  Decreased full extension of the left elbow.  Decreased range of motion of bilateral shoulders at extremes of rotation.

## 2023-01-17 NOTE — ASSESSMENT
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 RA diagnosed since 1993. Previously treated with MTX but stopped due to worsening CKD since January 2021, now continue sulfasalazine 1 gm twice daily and hydroxychloroquine 200 mg twice daily.  Overall, patient feels RA is well controlled and no flares on current regimen. Patient does not always remember p.m. dose of sulfasalazine, but does take at least 1 gm daily.  Patient sees ophthalmologist every 6 months, up-to-date, has follow up visit in July 2023, no evidence of Plaquenil toxicity.  History of osteoporosis,repeat DEXA 07/2021 with osteoporosis of the femoral neck, with history of right hip fracture, status post total hip replacement in January 2022.  Started on Prolia in June 2022, last dose due December 7, 2022. No jaw pain noted or pending dental work.  Continue calcium and vitamin D supplementation.  Continue weightbearing exercises as tolerated, fall precautions discussed.  History of chronic L spine DJD for which follows with pain management and takes Morphine daily. Recent labs reviewed from 11/2022, will update labs today given elevated liver function tests.  Will follow-up in 4 months or sooner as needed.  \par \par \par \par

## 2023-01-25 ENCOUNTER — OUTPATIENT (OUTPATIENT)
Dept: OUTPATIENT SERVICES | Facility: HOSPITAL | Age: 73
LOS: 1 days | End: 2023-01-25
Payer: MEDICARE

## 2023-01-25 ENCOUNTER — RESULT REVIEW (OUTPATIENT)
Age: 73
End: 2023-01-25

## 2023-01-25 ENCOUNTER — APPOINTMENT (OUTPATIENT)
Dept: ORTHOPEDIC SURGERY | Facility: CLINIC | Age: 73
End: 2023-01-25
Payer: MEDICARE

## 2023-01-25 VITALS
HEIGHT: 63 IN | HEART RATE: 56 BPM | BODY MASS INDEX: 28.53 KG/M2 | DIASTOLIC BLOOD PRESSURE: 62 MMHG | SYSTOLIC BLOOD PRESSURE: 122 MMHG | OXYGEN SATURATION: 96 % | WEIGHT: 161 LBS | TEMPERATURE: 97.9 F

## 2023-01-25 DIAGNOSIS — Z96.641 PRESENCE OF RIGHT ARTIFICIAL HIP JOINT: Chronic | ICD-10-CM

## 2023-01-25 PROCEDURE — 73502 X-RAY EXAM HIP UNI 2-3 VIEWS: CPT | Mod: 26,RT

## 2023-01-25 PROCEDURE — 99213 OFFICE O/P EST LOW 20 MIN: CPT

## 2023-01-25 PROCEDURE — 73502 X-RAY EXAM HIP UNI 2-3 VIEWS: CPT

## 2023-01-26 NOTE — PHYSICAL EXAM
[de-identified] : General appearance: well nourished and hydrated, pleasant, alert and oriented x 3, cooperative. \par HEENT: normocephalic, EOM intact, wearing mask, external auditory canal clear.  \par Cardiovascular: no lower leg edema, no varicosities, dorsalis pedis pulses palpable and symmetric.  \par Lymphatics: no palpable lymphadenopathy, no lymphedema.  \par Neurologic: sensation is normal, no muscle weakness in upper or lower extremities, patella tendon reflexes present and symmetric.  \par Dermatologic: skin moist, warm, no rash.  \par Spine: cervical spine with normal lordosis and painless range of motion, thoracic spine with normal kyphosis and painless range of motion, lumbosacral spine with normal lordosis and painless range of motion.  No tenderness to palpation along midline spine and paraspinal musculature.  Sacroiliac joints nontender bilaterally. Negative SLR and crossed SLR tests bilaterally.\par Gait: no assistive device, persistent right sided limp without antalgia\par \par Limb lengths: clinically similar\par \par Right hip: \par - Focal soft tissue swelling: none\par - Ecchymosis: none\par - Erythema: none\par - Wounds: well healed posterolateral incision, benign appearing \par - Tenderness: none\par - ROM: \par   - Flexion: 90\par   - Extension: 0\par   - Adduction: 15\par   - Abduction: 40\par   - Internal rotation in 90 degrees of hip flexion: 10\par   - External rotation in 90 degrees of hip flexion: 10\par - REDD: negative\par - FADIR: negative\par - Malu: negative\par - Stinchfield: negative\par - Flexor power: 5/5\par - Abductor power: 4+/5 [de-identified] : AP pelvis and right hip radiographs were obtained. These demonstrate stable appearance of her right ROSITA as compared to previous imaging without evidence of change over time or new mechanical complication. The degree of femoral subsidence has not changed as compared to previous imaging. There appears to be some additional solidification of the heterotopic ossification noted at the superolateral aspect of the acetabular component previously. The degree of HO at this point continues to be Tyrell 2-3 without bridging across the hip joint. Unchanged appearance noted, as well, of the lumbar spondylosis, large calcified fibroid uterus, and normal left hip morphology without evidence of OA or osteonecrosis.

## 2023-01-26 NOTE — END OF VISIT
[FreeTextEntry3] : All medical record entries made by the Scribe were at my, Dr. Leonard Almanzar, direction and personally dictated by me on 01/25/2023. I have reviewed the chart and agree that the record accurately reflects my personal performance of the history, physical exam, assessment and plan. I have also personally directed, reviewed, and agreed with the chart.

## 2023-01-26 NOTE — HISTORY OF PRESENT ILLNESS
[de-identified] : .Date of surgery: 1.24.2022 Right ROSITA \par \par 01/25/2023: 71 y/o female f/u for right ROSITA with multiple episodes of early dislocation. She is now 1 year s/p the primary operation. She last f/u in June 2022 and notes that in the interim she has been experiencing no pain, has been able to wean off of all of her ambulatory assist devices, and is only walking for exercise. She has no new complaints today. \par \par 6/29/22: 71 y/o female presenting for followup of right prosthetic hip instability. She reports her right hip is doing well and that she has not had any further dislocation episodes.  Denies pain but complains of some stiffness, ongoing inability to manage her shoes. States she ambulates with a rollator but is becoming less reliant on it, particularly at home. Has been participating in OPT/HEP.\par \par 4/27/22: 70y/o female presenting for evaluation of right prosthetic hip instability. She reports having suffered a mechanical trip and fall in January which resulted in a right hip fracture. This was treated acutely with a total hip arthroplasty at [facility], operated by [facility] on 1/24/22. She had an atraumatic right hip dislocation around 2/21 which was managed with a closed reduction in the [facility] ED. She dislocated again on 2/23 and was again closed reduced, this time in the [facility] ED. She denies any fall or trauma associated with either dislocation. She has been WBAT in a right knee immobilizer and walker since then without any further episodes of instability. She reports that she has no pain now. Never started PT. Baseline preinjury activity level was community ambulator without assistive device. She has previously been diagnosed with osteoporosis but has never started on any medical management. She never wants to return to [facility] and is here to establish care.\par \par Other significant PMH includes HTN, HLD, DM, RA.

## 2023-01-26 NOTE — ADDENDUM
[FreeTextEntry1] : Documented by Paige Escalante acting as a scribe for Dr. Leonard Almanzar on 01/25/2023.

## 2023-01-26 NOTE — DISCUSSION/SUMMARY
[de-identified] : 73 y/o female 1 year s/p right ROSITA with previous episodes of dislocation, now clinically and radiographically stable. \par - I recommended that she consider returning to outpatient PT to work on her strength and ROM, otherwise to work on her HEP and to continue f/u annually with repeat b/l hip XR or earlier as needed.

## 2023-02-02 ENCOUNTER — APPOINTMENT (OUTPATIENT)
Dept: HEART AND VASCULAR | Facility: CLINIC | Age: 73
End: 2023-02-02
Payer: MEDICARE

## 2023-02-02 ENCOUNTER — NON-APPOINTMENT (OUTPATIENT)
Age: 73
End: 2023-02-02

## 2023-02-02 VITALS
TEMPERATURE: 97.4 F | SYSTOLIC BLOOD PRESSURE: 112 MMHG | OXYGEN SATURATION: 99 % | BODY MASS INDEX: 28.35 KG/M2 | DIASTOLIC BLOOD PRESSURE: 61 MMHG | WEIGHT: 159.99 LBS | HEIGHT: 63 IN | HEART RATE: 53 BPM

## 2023-02-02 PROCEDURE — 93000 ELECTROCARDIOGRAM COMPLETE: CPT

## 2023-02-02 PROCEDURE — 99213 OFFICE O/P EST LOW 20 MIN: CPT | Mod: 25

## 2023-02-02 NOTE — PHYSICAL EXAM
[No Acute Distress] : no acute distress [Normal] : normal conjunctiva [Normal Venous Pressure] : normal venous pressure [Clear Lung Fields] : clear lung fields [No Edema] : no edema [de-identified] : systolic murmur; bradycardic

## 2023-02-02 NOTE — REASON FOR VISIT
[FreeTextEntry1] : Feels generally well. Still not fully recovered from hip fx, but able to walk unassisted. Doing physical therapy.\par BP at home 110-130/60s-70s mmHg. Taking meds as directed.\par Has continued to lose weight (deliberately).\par Not very physically active.

## 2023-02-14 ENCOUNTER — APPOINTMENT (OUTPATIENT)
Dept: INTERNAL MEDICINE | Facility: CLINIC | Age: 73
End: 2023-02-14
Payer: MEDICARE

## 2023-02-14 VITALS
RESPIRATION RATE: 14 BRPM | WEIGHT: 159.31 LBS | HEIGHT: 60 IN | DIASTOLIC BLOOD PRESSURE: 69 MMHG | BODY MASS INDEX: 31.28 KG/M2 | OXYGEN SATURATION: 97 % | TEMPERATURE: 97.5 F | HEART RATE: 57 BPM | SYSTOLIC BLOOD PRESSURE: 127 MMHG

## 2023-02-14 PROCEDURE — 99213 OFFICE O/P EST LOW 20 MIN: CPT | Mod: 25

## 2023-02-14 NOTE — ASSESSMENT
[FreeTextEntry1] : Unclear reason for itch\par Will obtain repeat labs\par Also Benadryl for the itching \par It could be related to the renal function

## 2023-02-14 NOTE — HISTORY OF PRESENT ILLNESS
[FreeTextEntry1] : Walking with walker'\par Getting [physial therapy\par Has itching all over body  [de-identified] : As above has a rash at times;\par All medication without change \par Had this iching a while ago; Was an allergy in past; \par

## 2023-02-14 NOTE — HEALTH RISK ASSESSMENT
[No] : No [No falls in past year] : Patient reported no falls in the past year [0] : 2) Feeling down, depressed, or hopeless: Not at all (0) [[id number]] : 0

## 2023-02-16 ENCOUNTER — NON-APPOINTMENT (OUTPATIENT)
Age: 73
End: 2023-02-16

## 2023-02-16 LAB
ALBUMIN SERPL ELPH-MCNC: 4.2 G/DL
ALP BLD-CCNC: 201 U/L
ALT SERPL-CCNC: 366 U/L
ANION GAP SERPL CALC-SCNC: 13 MMOL/L
AST SERPL-CCNC: 104 U/L
BASOPHILS # BLD AUTO: 0.02 K/UL
BASOPHILS NFR BLD AUTO: 0.3 %
BILIRUB SERPL-MCNC: 0.9 MG/DL
BUN SERPL-MCNC: 21 MG/DL
CALCIUM SERPL-MCNC: 9.8 MG/DL
CHLORIDE SERPL-SCNC: 102 MMOL/L
CO2 SERPL-SCNC: 19 MMOL/L
CREAT SERPL-MCNC: 1.79 MG/DL
EGFR: 30 ML/MIN/1.73M2
EOSINOPHIL # BLD AUTO: 0.06 K/UL
EOSINOPHIL # BLD MANUAL: 60 /UL
EOSINOPHIL NFR BLD AUTO: 1 %
GLUCOSE SERPL-MCNC: 148 MG/DL
HCT VFR BLD CALC: 41.2 %
HGB BLD-MCNC: 13.3 G/DL
IMM GRANULOCYTES NFR BLD AUTO: 0.8 %
LYMPHOCYTES # BLD AUTO: 1.45 K/UL
LYMPHOCYTES NFR BLD AUTO: 23 %
MAN DIFF?: NORMAL
MCHC RBC-ENTMCNC: 30.4 PG
MCHC RBC-ENTMCNC: 32.3 GM/DL
MCV RBC AUTO: 94.3 FL
MONOCYTES # BLD AUTO: 0.71 K/UL
MONOCYTES NFR BLD AUTO: 11.3 %
NEUTROPHILS # BLD AUTO: 4.01 K/UL
NEUTROPHILS NFR BLD AUTO: 63.6 %
PLATELET # BLD AUTO: 181 K/UL
POTASSIUM SERPL-SCNC: 4.2 MMOL/L
PROT SERPL-MCNC: 6.8 G/DL
RBC # BLD: 4.37 M/UL
RBC # FLD: 14.8 %
SODIUM SERPL-SCNC: 134 MMOL/L
WBC # FLD AUTO: 6.3 K/UL

## 2023-02-21 LAB
ALBUMIN SERPL ELPH-MCNC: 4.4 G/DL
ALP BLD-CCNC: 142 U/L
ALT SERPL-CCNC: 55 U/L
ANION GAP SERPL CALC-SCNC: 10 MMOL/L
AST SERPL-CCNC: 83 U/L
BASOPHILS # BLD AUTO: 0.06 K/UL
BASOPHILS NFR BLD AUTO: 0.8 %
BILIRUB SERPL-MCNC: 0.3 MG/DL
BUN SERPL-MCNC: 19 MG/DL
CALCIUM SERPL-MCNC: 9.7 MG/DL
CHLORIDE SERPL-SCNC: 101 MMOL/L
CO2 SERPL-SCNC: 25 MMOL/L
CREAT SERPL-MCNC: 1.83 MG/DL
EGFR: 29 ML/MIN/1.73M2
EOSINOPHIL # BLD AUTO: 0.1 K/UL
EOSINOPHIL NFR BLD AUTO: 1.3 %
GLUCOSE SERPL-MCNC: 147 MG/DL
HCT VFR BLD CALC: 43.6 %
HGB BLD-MCNC: 13.1 G/DL
IMM GRANULOCYTES NFR BLD AUTO: 0.5 %
LYMPHOCYTES # BLD AUTO: 1.42 K/UL
LYMPHOCYTES NFR BLD AUTO: 18.1 %
MAN DIFF?: NORMAL
MCHC RBC-ENTMCNC: 29.6 PG
MCHC RBC-ENTMCNC: 30 GM/DL
MCV RBC AUTO: 98.4 FL
MONOCYTES # BLD AUTO: 0.8 K/UL
MONOCYTES NFR BLD AUTO: 10.2 %
NEUTROPHILS # BLD AUTO: 5.41 K/UL
NEUTROPHILS NFR BLD AUTO: 69.1 %
PLATELET # BLD AUTO: 224 K/UL
POTASSIUM SERPL-SCNC: 5 MMOL/L
PROT SERPL-MCNC: 7.4 G/DL
RBC # BLD: 4.43 M/UL
RBC # FLD: 14.2 %
SODIUM SERPL-SCNC: 136 MMOL/L
WBC # FLD AUTO: 7.83 K/UL

## 2023-03-20 RX ORDER — METOPROLOL SUCCINATE 100 MG/1
100 TABLET, EXTENDED RELEASE ORAL
Qty: 90 | Refills: 3 | Status: ACTIVE | COMMUNITY
Start: 1900-01-01 | End: 1900-01-01

## 2023-03-28 ENCOUNTER — APPOINTMENT (OUTPATIENT)
Dept: INTERNAL MEDICINE | Facility: CLINIC | Age: 73
End: 2023-03-28
Payer: MEDICARE

## 2023-03-28 VITALS
BODY MASS INDEX: 27.29 KG/M2 | SYSTOLIC BLOOD PRESSURE: 132 MMHG | HEART RATE: 46 BPM | TEMPERATURE: 97.7 F | OXYGEN SATURATION: 94 % | DIASTOLIC BLOOD PRESSURE: 71 MMHG | WEIGHT: 154 LBS | HEIGHT: 63 IN

## 2023-03-28 PROCEDURE — 99213 OFFICE O/P EST LOW 20 MIN: CPT | Mod: 25

## 2023-03-28 NOTE — HISTORY OF PRESENT ILLNESS
[FreeTextEntry1] : Interim reviewed;\par Stopped Statin because of marked elevation of LFT\par  [de-identified] : Not using Insulin;\par A!c Noted\par Seeing renal MD at [facility]; \par Watching diet

## 2023-03-28 NOTE — HEALTH RISK ASSESSMENT
[No] : No [No falls in past year] : Patient reported no falls in the past year [0] : 2) Feeling down, depressed, or hopeless: Not at all (0) [[id number]] : 0

## 2023-03-28 NOTE — ASSESSMENT
[FreeTextEntry1] : Patient appears stable off statin \par Will repeat all labs \par Follow up with various consultants

## 2023-03-29 LAB
ALBUMIN SERPL ELPH-MCNC: 4.2 G/DL
ALP BLD-CCNC: 176 U/L
ALT SERPL-CCNC: 34 U/L
ANION GAP SERPL CALC-SCNC: 12 MMOL/L
AST SERPL-CCNC: 31 U/L
BILIRUB SERPL-MCNC: 0.5 MG/DL
BUN SERPL-MCNC: 17 MG/DL
CALCIUM SERPL-MCNC: 10.4 MG/DL
CHLORIDE SERPL-SCNC: 98 MMOL/L
CHOLEST SERPL-MCNC: 203 MG/DL
CO2 SERPL-SCNC: 24 MMOL/L
CREAT SERPL-MCNC: 1.66 MG/DL
EGFR: 32 ML/MIN/1.73M2
ESTIMATED AVERAGE GLUCOSE: 163 MG/DL
GLUCOSE SERPL-MCNC: 152 MG/DL
HBA1C MFR BLD HPLC: 7.3 %
HDLC SERPL-MCNC: 56 MG/DL
LDLC SERPL CALC-MCNC: 123 MG/DL
NONHDLC SERPL-MCNC: 147 MG/DL
POTASSIUM SERPL-SCNC: 5.2 MMOL/L
PROT SERPL-MCNC: 7.2 G/DL
SODIUM SERPL-SCNC: 135 MMOL/L
TRIGL SERPL-MCNC: 121 MG/DL

## 2023-05-16 ENCOUNTER — APPOINTMENT (OUTPATIENT)
Dept: INTERNAL MEDICINE | Facility: CLINIC | Age: 73
End: 2023-05-16

## 2023-05-31 ENCOUNTER — APPOINTMENT (OUTPATIENT)
Dept: INTERNAL MEDICINE | Facility: CLINIC | Age: 73
End: 2023-05-31

## 2023-06-14 ENCOUNTER — APPOINTMENT (OUTPATIENT)
Dept: RHEUMATOLOGY | Facility: CLINIC | Age: 73
End: 2023-06-14
Payer: MEDICARE

## 2023-06-14 PROCEDURE — 96372 THER/PROPH/DIAG INJ SC/IM: CPT

## 2023-06-14 RX ORDER — DENOSUMAB 60 MG/ML
60 INJECTION SUBCUTANEOUS
Qty: 1 | Refills: 0 | Status: COMPLETED | OUTPATIENT
Start: 2023-06-01

## 2023-06-15 ENCOUNTER — APPOINTMENT (OUTPATIENT)
Dept: HEART AND VASCULAR | Facility: CLINIC | Age: 73
End: 2023-06-15
Payer: MEDICARE

## 2023-06-15 ENCOUNTER — NON-APPOINTMENT (OUTPATIENT)
Age: 73
End: 2023-06-15

## 2023-06-15 VITALS
TEMPERATURE: 98.4 F | OXYGEN SATURATION: 98 % | HEIGHT: 63 IN | WEIGHT: 156.99 LBS | HEART RATE: 72 BPM | SYSTOLIC BLOOD PRESSURE: 110 MMHG | BODY MASS INDEX: 27.82 KG/M2 | DIASTOLIC BLOOD PRESSURE: 52 MMHG

## 2023-06-15 PROCEDURE — 93000 ELECTROCARDIOGRAM COMPLETE: CPT

## 2023-06-15 PROCEDURE — 99213 OFFICE O/P EST LOW 20 MIN: CPT | Mod: 25

## 2023-06-15 RX ORDER — FERROUS SULFATE 324(65)MG
324 TABLET, DELAYED RELEASE (ENTERIC COATED) ORAL
Refills: 0 | Status: DISCONTINUED | COMMUNITY
End: 2023-06-15

## 2023-06-15 RX ORDER — FERROUS SULFATE TAB 325 MG (65 MG ELEMENTAL FE) 325 (65 FE) MG
325 (65 FE) TAB ORAL
Qty: 1 | Refills: 3 | Status: DISCONTINUED | COMMUNITY
Start: 2022-02-14 | End: 2023-06-15

## 2023-06-15 NOTE — REASON FOR VISIT
[FreeTextEntry1] : Here for f/u of HTN. Continues to feel well.\par Doing upper body exercises 3X week. Still finds walking difficult because of arthritis.\par Checking her BP at home regularly -- typically <130/80 mmHg.\par Blood sugar also improved and no longer regularly taking insulin.\par Continues to work on her diet to lose weight.\par Patient reports taking her listed medications as directed.\par

## 2023-06-15 NOTE — PHYSICAL EXAM
[No Acute Distress] : no acute distress [Normal Venous Pressure] : normal venous pressure [Normal] : clear lung fields, good air entry, no respiratory distress [No Edema] : no edema

## 2023-06-21 ENCOUNTER — APPOINTMENT (OUTPATIENT)
Dept: INTERNAL MEDICINE | Facility: CLINIC | Age: 73
End: 2023-06-21
Payer: MEDICARE

## 2023-06-21 VITALS
TEMPERATURE: 97.9 F | SYSTOLIC BLOOD PRESSURE: 129 MMHG | BODY MASS INDEX: 27.64 KG/M2 | DIASTOLIC BLOOD PRESSURE: 80 MMHG | HEART RATE: 61 BPM | OXYGEN SATURATION: 95 % | WEIGHT: 156 LBS | HEIGHT: 63 IN | RESPIRATION RATE: 14 BRPM

## 2023-06-21 DIAGNOSIS — M81.0 AGE-RELATED OSTEOPOROSIS W/OUT CURRENT PATHOLOGICAL FRACTURE: ICD-10-CM

## 2023-06-21 PROCEDURE — 99213 OFFICE O/P EST LOW 20 MIN: CPT

## 2023-06-21 NOTE — HISTORY OF PRESENT ILLNESS
[FreeTextEntry1] : Dr Casarez follow up noted;\par Feels she restart Statin with increase in Lipids\par Trying to follow diet\par Recent fall;\par Injured foot;  Saw Podiatrist and referred to vascular surgeon [de-identified] : Needs GYN referral\par All the medication without change\par Dr Johnston stopped Vitamin D\par Got Prolia injection last week\par Will be going to a clinic re Uterine fibroid and prolapse\par However will refer to GYN

## 2023-07-05 ENCOUNTER — NON-APPOINTMENT (OUTPATIENT)
Age: 73
End: 2023-07-05

## 2023-07-05 ENCOUNTER — APPOINTMENT (OUTPATIENT)
Dept: OBGYN | Facility: CLINIC | Age: 73
End: 2023-07-05
Payer: MEDICARE

## 2023-07-05 VITALS
WEIGHT: 154 LBS | BODY MASS INDEX: 27.29 KG/M2 | DIASTOLIC BLOOD PRESSURE: 80 MMHG | SYSTOLIC BLOOD PRESSURE: 120 MMHG | HEIGHT: 63 IN

## 2023-07-05 PROCEDURE — 99214 OFFICE O/P EST MOD 30 MIN: CPT

## 2023-07-05 NOTE — PLAN
[FreeTextEntry1] : - Offered pelvic exam today, she declined as she has had the prolapse examined in the recent past. She reports hx pelvic sonogram about 2 years ago - has the records at home.\par - Referral for updated TVS/pelvic sonogram and pt to return with previous sono to compare. Will do pelvic/prolapse evaluation and possible pessary fitting when the returns

## 2023-07-05 NOTE — HISTORY OF PRESENT ILLNESS
[Patient reported bone density results were abnormal] : Patient reported bone density results were abnormal [postmenopausal] : postmenopausal [N] : Patient is not sexually active [Post-Menopause, No Sxs] : post-menopausal, currently without symptoms [Previously active] : previously active [FreeTextEntry1] : Noemi Minor presents with an office visit for uterine prolapse and a large fibroid. Ms. Minor has been followed at a [facility] clinic for years now but was referred here by her IM physician. She reports a known hx of a large uterine fibroids about grape-fruit sized for at least 40yrs now (since birth of her only child). She denies any issues with it other that awreness it is there. She then noted more than 2 years ago now she started to feel her uterus dropping in her vagina. She has seen a urogyn a/w [facility] about a year ago and was told to follow up in one year as needed but she did not have to have surgery or a pessary unless it was bothering her. It is now bothering her more as she feels it falling at the end of the day/especially after walking a lot but is not yet set on a pessary or surgery. Recalls being told about UFE.\par \par OBHx:  x 1\par GynHx: as above, denies other abnl hx\par Med/SurgSocHx/Meds/All: see chart\par \par  [BoneDensityDate] : 7/24/21 [TextBox_37] : osteoporosis

## 2023-07-09 ENCOUNTER — APPOINTMENT (OUTPATIENT)
Dept: ULTRASOUND IMAGING | Facility: HOSPITAL | Age: 73
End: 2023-07-09

## 2023-07-09 ENCOUNTER — INPATIENT (INPATIENT)
Facility: HOSPITAL | Age: 73
LOS: 14 days | Discharge: NURSING, REHAB OR LONG-TERM CARE | DRG: 180 | End: 2023-07-24
Attending: INTERNAL MEDICINE | Admitting: PSYCHIATRY & NEUROLOGY
Payer: MEDICARE

## 2023-07-09 VITALS
SYSTOLIC BLOOD PRESSURE: 143 MMHG | DIASTOLIC BLOOD PRESSURE: 88 MMHG | HEART RATE: 88 BPM | TEMPERATURE: 98 F | OXYGEN SATURATION: 98 % | RESPIRATION RATE: 19 BRPM

## 2023-07-09 DIAGNOSIS — Z96.641 PRESENCE OF RIGHT ARTIFICIAL HIP JOINT: Chronic | ICD-10-CM

## 2023-07-09 LAB
ANION GAP SERPL CALC-SCNC: 11 MMOL/L — SIGNIFICANT CHANGE UP (ref 5–17)
APPEARANCE UR: CLEAR — SIGNIFICANT CHANGE UP
BACTERIA # UR AUTO: ABNORMAL /HPF
BASOPHILS # BLD AUTO: 0.08 K/UL — SIGNIFICANT CHANGE UP (ref 0–0.2)
BASOPHILS NFR BLD AUTO: 1.1 % — SIGNIFICANT CHANGE UP (ref 0–2)
BILIRUB UR-MCNC: NEGATIVE — SIGNIFICANT CHANGE UP
BUN SERPL-MCNC: 24 MG/DL — HIGH (ref 7–23)
CALCIUM SERPL-MCNC: 10.4 MG/DL — SIGNIFICANT CHANGE UP (ref 8.4–10.5)
CHLORIDE SERPL-SCNC: 108 MMOL/L — SIGNIFICANT CHANGE UP (ref 96–108)
CO2 SERPL-SCNC: 25 MMOL/L — SIGNIFICANT CHANGE UP (ref 22–31)
COLOR SPEC: YELLOW — SIGNIFICANT CHANGE UP
COMMENT - URINE: SIGNIFICANT CHANGE UP
CREAT SERPL-MCNC: 1.89 MG/DL — HIGH (ref 0.5–1.3)
DIFF PNL FLD: ABNORMAL
EGFR: 28 ML/MIN/1.73M2 — LOW
EOSINOPHIL # BLD AUTO: 0.11 K/UL — SIGNIFICANT CHANGE UP (ref 0–0.5)
EOSINOPHIL NFR BLD AUTO: 1.4 % — SIGNIFICANT CHANGE UP (ref 0–6)
EPI CELLS # UR: SIGNIFICANT CHANGE UP /HPF (ref 0–5)
GLUCOSE BLDC GLUCOMTR-MCNC: 102 MG/DL — HIGH (ref 70–99)
GLUCOSE BLDC GLUCOMTR-MCNC: 125 MG/DL — HIGH (ref 70–99)
GLUCOSE BLDC GLUCOMTR-MCNC: 161 MG/DL — HIGH (ref 70–99)
GLUCOSE BLDC GLUCOMTR-MCNC: 229 MG/DL — HIGH (ref 70–99)
GLUCOSE SERPL-MCNC: 154 MG/DL — HIGH (ref 70–99)
GLUCOSE UR QL: >=1000
HCT VFR BLD CALC: 39.6 % — SIGNIFICANT CHANGE UP (ref 34.5–45)
HGB BLD-MCNC: 12.2 G/DL — SIGNIFICANT CHANGE UP (ref 11.5–15.5)
IMM GRANULOCYTES NFR BLD AUTO: 0.7 % — SIGNIFICANT CHANGE UP (ref 0–0.9)
KETONES UR-MCNC: NEGATIVE — SIGNIFICANT CHANGE UP
LEUKOCYTE ESTERASE UR-ACNC: NEGATIVE — SIGNIFICANT CHANGE UP
LYMPHOCYTES # BLD AUTO: 1.54 K/UL — SIGNIFICANT CHANGE UP (ref 1–3.3)
LYMPHOCYTES # BLD AUTO: 20.3 % — SIGNIFICANT CHANGE UP (ref 13–44)
MAGNESIUM SERPL-MCNC: 1.7 MG/DL — SIGNIFICANT CHANGE UP (ref 1.6–2.6)
MCHC RBC-ENTMCNC: 29.7 PG — SIGNIFICANT CHANGE UP (ref 27–34)
MCHC RBC-ENTMCNC: 30.8 GM/DL — LOW (ref 32–36)
MCV RBC AUTO: 96.4 FL — SIGNIFICANT CHANGE UP (ref 80–100)
MONOCYTES # BLD AUTO: 0.85 K/UL — SIGNIFICANT CHANGE UP (ref 0–0.9)
MONOCYTES NFR BLD AUTO: 11.2 % — SIGNIFICANT CHANGE UP (ref 2–14)
NEUTROPHILS # BLD AUTO: 4.97 K/UL — SIGNIFICANT CHANGE UP (ref 1.8–7.4)
NEUTROPHILS NFR BLD AUTO: 65.3 % — SIGNIFICANT CHANGE UP (ref 43–77)
NITRITE UR-MCNC: POSITIVE
NRBC # BLD: 0 /100 WBCS — SIGNIFICANT CHANGE UP (ref 0–0)
PH UR: 5.5 — SIGNIFICANT CHANGE UP (ref 5–8)
PLATELET # BLD AUTO: 182 K/UL — SIGNIFICANT CHANGE UP (ref 150–400)
POTASSIUM SERPL-MCNC: 4 MMOL/L — SIGNIFICANT CHANGE UP (ref 3.5–5.3)
POTASSIUM SERPL-SCNC: 4 MMOL/L — SIGNIFICANT CHANGE UP (ref 3.5–5.3)
PROT UR-MCNC: ABNORMAL MG/DL
RBC # BLD: 4.11 M/UL — SIGNIFICANT CHANGE UP (ref 3.8–5.2)
RBC # FLD: 13.2 % — SIGNIFICANT CHANGE UP (ref 10.3–14.5)
RBC CASTS # UR COMP ASSIST: > 10 /HPF
SODIUM SERPL-SCNC: 144 MMOL/L — SIGNIFICANT CHANGE UP (ref 135–145)
SP GR SPEC: 1.02 — SIGNIFICANT CHANGE UP (ref 1–1.03)
TROPONIN T, HIGH SENSITIVITY RESULT: 40 NG/L — SIGNIFICANT CHANGE UP (ref 0–51)
UROBILINOGEN FLD QL: 0.2 E.U./DL — SIGNIFICANT CHANGE UP
WBC # BLD: 7.6 K/UL — SIGNIFICANT CHANGE UP (ref 3.8–10.5)
WBC # FLD AUTO: 7.6 K/UL — SIGNIFICANT CHANGE UP (ref 3.8–10.5)
WBC UR QL: < 5 /HPF — SIGNIFICANT CHANGE UP

## 2023-07-09 PROCEDURE — 71045 X-RAY EXAM CHEST 1 VIEW: CPT | Mod: 26

## 2023-07-09 PROCEDURE — 99285 EMERGENCY DEPT VISIT HI MDM: CPT

## 2023-07-09 PROCEDURE — 70547 MR ANGIOGRAPHY NECK W/O DYE: CPT | Mod: 26

## 2023-07-09 PROCEDURE — 73030 X-RAY EXAM OF SHOULDER: CPT | Mod: 26,LT

## 2023-07-09 PROCEDURE — 70551 MRI BRAIN STEM W/O DYE: CPT | Mod: 26

## 2023-07-09 PROCEDURE — 70450 CT HEAD/BRAIN W/O DYE: CPT | Mod: 26,MA

## 2023-07-09 PROCEDURE — 99223 1ST HOSP IP/OBS HIGH 75: CPT

## 2023-07-09 PROCEDURE — 70544 MR ANGIOGRAPHY HEAD W/O DYE: CPT | Mod: 26,XU

## 2023-07-09 RX ORDER — LIDOCAINE 4 G/100G
1 CREAM TOPICAL EVERY 24 HOURS
Refills: 0 | Status: DISCONTINUED | OUTPATIENT
Start: 2023-07-09 | End: 2023-07-24

## 2023-07-09 RX ORDER — ACETAMINOPHEN 500 MG
650 TABLET ORAL EVERY 6 HOURS
Refills: 0 | Status: DISCONTINUED | OUTPATIENT
Start: 2023-07-09 | End: 2023-07-24

## 2023-07-09 RX ORDER — DEXTROSE 50 % IN WATER 50 %
25 SYRINGE (ML) INTRAVENOUS ONCE
Refills: 0 | Status: DISCONTINUED | OUTPATIENT
Start: 2023-07-09 | End: 2023-07-18

## 2023-07-09 RX ORDER — ASPIRIN/CALCIUM CARB/MAGNESIUM 324 MG
81 TABLET ORAL ONCE
Refills: 0 | Status: COMPLETED | OUTPATIENT
Start: 2023-07-09 | End: 2023-07-09

## 2023-07-09 RX ORDER — CLOPIDOGREL BISULFATE 75 MG/1
75 TABLET, FILM COATED ORAL ONCE
Refills: 0 | Status: COMPLETED | OUTPATIENT
Start: 2023-07-09 | End: 2023-07-09

## 2023-07-09 RX ORDER — IBUPROFEN 200 MG
600 TABLET ORAL ONCE
Refills: 0 | Status: DISCONTINUED | OUTPATIENT
Start: 2023-07-09 | End: 2023-07-24

## 2023-07-09 RX ORDER — AMLODIPINE BESYLATE 2.5 MG/1
10 TABLET ORAL EVERY 24 HOURS
Refills: 0 | Status: DISCONTINUED | OUTPATIENT
Start: 2023-07-10 | End: 2023-07-24

## 2023-07-09 RX ORDER — SODIUM CHLORIDE 9 MG/ML
1000 INJECTION, SOLUTION INTRAVENOUS
Refills: 0 | Status: DISCONTINUED | OUTPATIENT
Start: 2023-07-09 | End: 2023-07-24

## 2023-07-09 RX ORDER — INSULIN LISPRO 100/ML
VIAL (ML) SUBCUTANEOUS
Refills: 0 | Status: DISCONTINUED | OUTPATIENT
Start: 2023-07-09 | End: 2023-07-18

## 2023-07-09 RX ORDER — INSULIN LISPRO 100/ML
VIAL (ML) SUBCUTANEOUS AT BEDTIME
Refills: 0 | Status: DISCONTINUED | OUTPATIENT
Start: 2023-07-09 | End: 2023-07-13

## 2023-07-09 RX ORDER — DEXTROSE 50 % IN WATER 50 %
15 SYRINGE (ML) INTRAVENOUS ONCE
Refills: 0 | Status: DISCONTINUED | OUTPATIENT
Start: 2023-07-09 | End: 2023-07-18

## 2023-07-09 RX ORDER — DEXTROSE 50 % IN WATER 50 %
12.5 SYRINGE (ML) INTRAVENOUS ONCE
Refills: 0 | Status: DISCONTINUED | OUTPATIENT
Start: 2023-07-09 | End: 2023-07-18

## 2023-07-09 RX ORDER — METOPROLOL TARTRATE 50 MG
100 TABLET ORAL EVERY 24 HOURS
Refills: 0 | Status: DISCONTINUED | OUTPATIENT
Start: 2023-07-09 | End: 2023-07-24

## 2023-07-09 RX ORDER — SODIUM CHLORIDE 9 MG/ML
1000 INJECTION, SOLUTION INTRAVENOUS
Refills: 0 | Status: DISCONTINUED | OUTPATIENT
Start: 2023-07-09 | End: 2023-07-18

## 2023-07-09 RX ORDER — HYDRALAZINE HCL 50 MG
10 TABLET ORAL ONCE
Refills: 0 | Status: COMPLETED | OUTPATIENT
Start: 2023-07-09 | End: 2023-07-10

## 2023-07-09 RX ORDER — LOSARTAN POTASSIUM 100 MG/1
100 TABLET, FILM COATED ORAL EVERY 24 HOURS
Refills: 0 | Status: DISCONTINUED | OUTPATIENT
Start: 2023-07-09 | End: 2023-07-24

## 2023-07-09 RX ORDER — GLUCAGON INJECTION, SOLUTION 0.5 MG/.1ML
1 INJECTION, SOLUTION SUBCUTANEOUS ONCE
Refills: 0 | Status: DISCONTINUED | OUTPATIENT
Start: 2023-07-09 | End: 2023-07-24

## 2023-07-09 RX ORDER — ATORVASTATIN CALCIUM 80 MG/1
80 TABLET, FILM COATED ORAL AT BEDTIME
Refills: 0 | Status: DISCONTINUED | OUTPATIENT
Start: 2023-07-09 | End: 2023-07-10

## 2023-07-09 RX ADMIN — Medication 4: at 16:49

## 2023-07-09 RX ADMIN — LIDOCAINE 1 PATCH: 4 CREAM TOPICAL at 21:00

## 2023-07-09 RX ADMIN — Medication 81 MILLIGRAM(S): at 03:47

## 2023-07-09 RX ADMIN — LIDOCAINE 1 PATCH: 4 CREAM TOPICAL at 09:10

## 2023-07-09 RX ADMIN — Medication 650 MILLIGRAM(S): at 15:27

## 2023-07-09 RX ADMIN — LIDOCAINE 1 PATCH: 4 CREAM TOPICAL at 18:59

## 2023-07-09 RX ADMIN — Medication 100 MILLIGRAM(S): at 12:31

## 2023-07-09 RX ADMIN — ATORVASTATIN CALCIUM 80 MILLIGRAM(S): 80 TABLET, FILM COATED ORAL at 21:30

## 2023-07-09 RX ADMIN — CLOPIDOGREL BISULFATE 75 MILLIGRAM(S): 75 TABLET, FILM COATED ORAL at 03:46

## 2023-07-09 RX ADMIN — Medication 650 MILLIGRAM(S): at 16:11

## 2023-07-09 RX ADMIN — LOSARTAN POTASSIUM 100 MILLIGRAM(S): 100 TABLET, FILM COATED ORAL at 15:26

## 2023-07-09 NOTE — ED PROVIDER NOTE - NS ED ROS FT
Constitutional: No fever or chills, lightheadedness  Eyes: No discharge or drainage  Ears, Nose, Mouth, Throat: No nasal discharge, no sore throat  Cardiovascular: +chest pain, no palpitations  Respiratory: No shortness of breath, no cough  Gastrointestinal: No nausea or vomiting, no abdominal pain, no diarrhea or constipation  Musculoskeletal: No joint pain, no swelling  Skin: No rashes or lesions  Neurological: No numbness, weakness, tingling, no headache, dizziness

## 2023-07-09 NOTE — ED PROVIDER NOTE - NS ED ATTENDING STATEMENT MOD
Attending Only Quality 110: Preventive Care And Screening: Influenza Immunization: Influenza Immunization Ordered or Recommended, but not Administered due to system reason Quality 431: Preventive Care And Screening: Unhealthy Alcohol Use - Screening: Patient not identified as an unhealthy alcohol user when screened for unhealthy alcohol use using a systematic screening method Detail Level: Generalized Quality 130: Documentation Of Current Medications In The Medical Record: Current Medications Documented Quality 226: Preventive Care And Screening: Tobacco Use: Screening And Cessation Intervention: Patient screened for tobacco use and is an ex/non-smoker

## 2023-07-09 NOTE — PHYSICAL THERAPY INITIAL EVALUATION ADULT - GENERAL OBSERVATIONS, REHAB EVAL
Received supine in NAD, complaints of L shoulder AROM pain +EKG, IV hep, primafit. left as found +RN mark aware

## 2023-07-09 NOTE — ED PROVIDER NOTE - PROGRESS NOTE DETAILS
trop 44 but pt with CKD. HEART score of 6. Will do 3 hour trop. CT with chronic infarcts which may explain dizziness. Stroke PA called, will see patient. Stroke PA recommends admission, recommends asa 81 mg and plavix 75 mg.

## 2023-07-09 NOTE — H&P ADULT - TIME-BASED
Partially impaired: cannot see medication labels or newsprint, but can see obstacles in path, and the surrounding layout; can count fingers at arm's length 70

## 2023-07-09 NOTE — PHYSICAL THERAPY INITIAL EVALUATION ADULT - LEVEL OF INDEPENDENCE, REHAB EVAL
minimum assist (75% patients effort)
Patient will perform bed mobility with moderate assistance by discharge.

## 2023-07-09 NOTE — ED PROVIDER NOTE - NS ED MD DISPO SPECIAL CONSIDERATION1
3/20/2023    Patient: Jefry Russell   YOB: 1983   Date of Visit: 3/15/2023     Rosanne Benavides MD  [address] [place] [address]  Via In Basket    Dear Rosanne Benavides MD,      Thank you for referring Ms. Chase Crigler to [facility] for evaluation. My notes for this consultation are attached. If you have questions, please do not hesitate to call me. I look forward to following your patient along with you.       Sincerely,    Jl Johnson MD
None

## 2023-07-09 NOTE — ED PROVIDER NOTE - CLINICAL SUMMARY MEDICAL DECISION MAKING FREE TEXT BOX
72 yo F with fatigue, dizziness, lightheadedness. Also cp yesterday. Well appearing. EKG abnormal, not STEMI criteria, cp yesterday, will check trop for ACS r/o. Will check labs and UA, CXR for ro infection. CT head, reassess. 72 yo F with fatigue, dizziness, lightheadedness. Also cp yesterday. Well appearing. EKG abnormal, not STEMI criteria, cp yesterday, will check trop for ACS r/o. Will check labs and UA, CXR for ro infection. CT head as feeling of disequilibrium for ro CVA, >1 week of symptoms, outside therapeutic window, reassess.

## 2023-07-09 NOTE — PROGRESS NOTE ADULT - SUBJECTIVE AND OBJECTIVE BOX
Neurology Stroke Progress Note    INTERVAL HPI/OVERNIGHT EVENTS:  Patient seen and examined. c/o Left shoulder pain.     MEDICATIONS  (STANDING):  atorvastatin 80 milliGRAM(s) Oral at bedtime  dextrose 5%. 1000 milliLiter(s) (100 mL/Hr) IV Continuous <Continuous>  dextrose 5%. 1000 milliLiter(s) (50 mL/Hr) IV Continuous <Continuous>  dextrose 50% Injectable 25 Gram(s) IV Push once  dextrose 50% Injectable 12.5 Gram(s) IV Push once  dextrose 50% Injectable 25 Gram(s) IV Push once  glucagon  Injectable 1 milliGRAM(s) IntraMuscular once  insulin lispro (ADMELOG) corrective regimen sliding scale   SubCutaneous three times a day before meals  insulin lispro (ADMELOG) corrective regimen sliding scale   SubCutaneous at bedtime  lidocaine   4% Patch 1 Patch Transdermal every 24 hours  losartan 100 milliGRAM(s) Oral every 24 hours  metoprolol succinate  milliGRAM(s) Oral every 24 hours    MEDICATIONS  (PRN):  acetaminophen     Tablet .. 650 milliGRAM(s) Oral every 6 hours PRN Temp greater or equal to 38.5C (101.3F), Mild Pain (1 - 3), Moderate Pain (4 - 6)  dextrose Oral Gel 15 Gram(s) Oral once PRN Blood Glucose LESS THAN 70 milliGRAM(s)/deciliter    Allergies    No Known Allergies    Intolerances      Vital Signs Last 24 Hrs  T(C): 37.2 (2023 10:55), Max: 37.2 (2023 10:55)  T(F): 99 (2023 10:55), Max: 99 (2023 10:55)  HR: 72 (2023 11:50) (64 - 88)  BP: 158/69 (2023 11:50) (127/79 - 163/77)  BP(mean): 99 (2023 11:50) (99 - 120)  RR: 32 (2023 11:50) (17 - 32)  SpO2: 96% (2023 11:50) (95% - 98%)    Parameters below as of 2023 11:50  Patient On (Oxygen Delivery Method): room air        Physical exam:  General: No acute distress, awake and alert  Eyes: Anicteric sclerae, moist conjunctivae, see below for CNs  Neck: trachea midline, FROM, supple, no thyromegaly or lymphadenopathy  Cardiovascular: Regular rate and rhythm, no murmurs, rubs, or gallops. No carotid bruits.   Pulmonary: Anterior breath sounds clear bilaterally, no crackles or wheezing. No use of accessory muscles  GI: Abdomen soft, non-distended, non-tender  Extremities: Radial and DP pulses +2, no edema    Neurologic:  -Mental status: Awake, alert, oriented to person, place, and time. Dysarthria ( reported chronic). Recent and remote memory intact. Follows commands. Attention/concentration intact. Fund of knowledge appropriate.  -Cranial nerves:   II: Visual fields are full to confrontation.  III, IV, VI: Extraocular movements are intact without nystagmus. Pupils equally round and reactive to light  V:  Facial sensation V1-V3 equal and intact   VII: left facial droop (reported baseline)   VIII: Hearing is bilaterally intact to finger rub  IX, X: Uvula is midline and soft palate rises symmetrically  XI: Head turning and shoulder shrug are intact.  XII: Tongue protrudes midline  Motor: Normal bulk and tone. No pronator drift. RUE 5/5, RLE 5/5, LUE 3/5 limited with left shoulder pain, 3+/5 LLE  Rapid alternating movements intact and symmetric  Sensation: Intact to light touch bilaterally. No neglect or extinction on double simultaneous testing.  Coordination: No dysmetria on finger-to-nose and heel-to-shin bilaterally  Reflexes: Downgoing toes bilaterally   Gait: Narrow gait and steady    LABS:                        12.2   7.60  )-----------( 182      ( 2023 00:52 )             39.6     -    144  |  108  |  24<H>  ----------------------------<  154<H>  4.0   |  25  |  1.89<H>    Ca    10.4      2023 00:52  Mg     1.7     07-        Urinalysis Basic - ( 2023 01:34 )    Color: Yellow / Appearance: Clear / S.025 / pH: x  Gluc: x / Ketone: NEGATIVE  / Bili: Negative / Urobili: 0.2 E.U./dL   Blood: x / Protein: Trace mg/dL / Nitrite: POSITIVE   Leuk Esterase: NEGATIVE / RBC: > 10 /HPF / WBC < 5 /HPF   Sq Epi: x / Non Sq Epi: x / Bacteria: Many /HPF        RADIOLOGY & ADDITIONAL TESTS:

## 2023-07-09 NOTE — PHYSICAL THERAPY INITIAL EVALUATION ADULT - MD/RN NOTIFIED
Abnormality of left atrial appendage  WATCHMAN  LEFT ATRIAL APPENDAGE CLOSUIRE   Jan. 30 2017  AV fistula  right lower arm  History of unilateral nephrectomy  left  nephrectomy  Renal transplant recipient yes

## 2023-07-09 NOTE — PHYSICAL THERAPY INITIAL EVALUATION ADULT - PERTINENT HX OF CURRENT PROBLEM, REHAB EVAL
73F presents to the  ED for unsteadiness and dizziness x1 week. LKN was a week ago. mRS of 2, walks with a rolling walker but is able to care for her basic everyday needs. NIH of 5 for L facial and dysarthria and LUE weakness. 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

## 2023-07-09 NOTE — H&P ADULT - NSHPPHYSICALEXAM_GEN_ALL_CORE
**INCOMPLETE          Vital Signs Last 24 Hrs  T(C): 36.7 (09 Jul 2023 02:38), Max: 36.8 (09 Jul 2023 00:39)  T(F): 98 (09 Jul 2023 02:38), Max: 98.3 (09 Jul 2023 00:39)  HR: 86 (09 Jul 2023 02:38) (86 - 88)  BP: 127/79 (09 Jul 2023 02:38) (127/79 - 143/88)  BP(mean): --  RR: 17 (09 Jul 2023 02:38) (17 - 19)  SpO2: 98% (09 Jul 2023 02:38) (98% - 98%)    Parameters below as of 09 Jul 2023 02:38  Patient On (Oxygen Delivery Method): room air AxOx3, slurred speech (family and patient states baseline) able to follow all commands and answer all questions  PERRL, EMOI, VFF, L facial droop (baseline- past bells palsy)  RUE 5/5, no drift  LUE 3/5, antigravity but pain limited  LE bilaterally 5/5  sensations intact x4   Gait deferred    NIH 5    Vital Signs Last 24 Hrs  T(C): 36.7 (09 Jul 2023 02:38), Max: 36.8 (09 Jul 2023 00:39)  T(F): 98 (09 Jul 2023 02:38), Max: 98.3 (09 Jul 2023 00:39)  HR: 86 (09 Jul 2023 02:38) (86 - 88)  BP: 127/79 (09 Jul 2023 02:38) (127/79 - 143/88)  BP(mean): --  RR: 17 (09 Jul 2023 02:38) (17 - 19)  SpO2: 98% (09 Jul 2023 02:38) (98% - 98%)    Parameters below as of 09 Jul 2023 02:38  Patient On (Oxygen Delivery Method): room air

## 2023-07-09 NOTE — ED ADULT NURSE NOTE - NSFALLCONCLUSION_ED_ALL_ED
Fall Risk Consent 2/Introductory Paragraph: Mohs surgery was explained to the patient and consent was obtained. The risks, benefits and alternatives to therapy were discussed in detail. Specifically, the risks of infection, scarring, bleeding, prolonged wound healing, incomplete removal, allergy to anesthesia, nerve injury and recurrence were addressed. Prior to the procedure, the treatment site was clearly identified and confirmed by the patient. All components of Universal Protocol/PAUSE Rule completed.

## 2023-07-09 NOTE — ED ADULT NURSE NOTE - NSFALLRISKINTERV_ED_ALL_ED

## 2023-07-09 NOTE — H&P ADULT - NSHPLABSRESULTS_GEN_ALL_CORE
Fingerstick Blood Glucose: CAPILLARY BLOOD GLUCOSE    LABS:                        12.2   7.60  )-----------( 182      ( 09 Jul 2023 00:52 )             39.6     07-09    144  |  108  |  24<H>  ----------------------------<  154<H>  4.0   |  25  |  1.89<H>    Ca    10.4      09 Jul 2023 00:52  Mg     1.7     07-09    Urinalysis Basic - ( 09 Jul 2023 00:52 )    Color: x / Appearance: x / SG: x / pH: x  Gluc: 154 mg/dL / Ketone: x  / Bili: x / Urobili: x   Blood: x / Protein: x / Nitrite: x   Leuk Esterase: x / RBC: x / WBC x   Sq Epi: x / Non Sq Epi: x / Bacteria: x    RADIOLOGY & ADDITIONAL STUDIES:    HCT: < from: CT Head No Cont (07.09.23 @ 02:42) >    IMPRESSION:  No acute intracranial hemorrhage, acute demarcated territorial   infarction, or masslike lesion. Age indeterminant left cerebellar and   left internal capsule lacunar infarcts.    < end of copied text >    CTA: deferred for CKD

## 2023-07-09 NOTE — ED ADULT TRIAGE NOTE - ARRIVAL INFO ADDITIONAL COMMENTS
Patient reports multiple instances of falls at home. Presents with generalized weakness over the course of one week.

## 2023-07-09 NOTE — ED PROVIDER NOTE - OBJECTIVE STATEMENT
73 year old female with history of HTN, HLD, CKD, DM presenting with generalized fatigue, dizziness X 1 month. Also intermittent lightheadedness. Uses walker at baseline. Had some chest pain, left sided, yesterday, now resolved. No palpitations, no sob. No headache or blurry vision, no abd pain, no n/v. No urinary complaints, no d/c, no melena or brbpr. No numbness, weakness, tingling. ROS as above. 73 year old female with history of HTN, HLD, CKD, DM presenting with generalized fatigue, dizziness X 1 month. States she has sensation of disequilibrium, worsening over last week. Has had frequent falls as a result. Also intermittent lightheadedness. Uses walker at baseline. Had some chest pain, left sided, yesterday, now resolved. No palpitations, no sob. No headache or blurry vision, no abd pain, no n/v. No urinary complaints, no d/c, no melena or brbpr. No numbness, weakness, tingling. ROS as above.

## 2023-07-09 NOTE — H&P ADULT - HISTORY OF PRESENT ILLNESS
**STROKE HPI***    HPI: 73y Female with PMHx of                                         **STROKE HPI***    HPI: 73y Female with PMHx of HTN HLD, DM, R hip replacement, RA, CKD (Cr baseline ~2) presents to the  ED for unsteadiness and dizziness x1 week. LKN was a week ago. mRS of 2, walks with a rolling walker but is able to care for her basic everyday needs. NIH of 5 for L facial and dysarthria and LUE weakness. 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 Daughter at bedside states that she is at her baseline. LUE is pain limited to her rheumatoid arthritis but patient states that she definitely has noticed it to be slightly weaker than usual. Otherwise no numbness or tingling, no headache, N/V. CTH was completed in the ED concerning for age indeterminate L cerebellar lacunar infarcts.

## 2023-07-09 NOTE — H&P ADULT - ASSESSMENT
73y Female with PMHx of HTN HLD, DM, R hip replacement, RA, CKD (Cr baseline ~2) presents to the  ED for unsteadiness and dizziness x1 week. LKN was a week ago. mRS of 2, walks with a rolling walker but is able to care for her basic everyday needs. NIH of 5 for L facial and dysarthria and LUE weakness. 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 Daughter at bedside states that she is at her baseline. LUE is pain limited to her rheumatoid arthritis but patient states that she definitely has noticed it to be slightly weaker than usual. Otherwise no numbness or tingling, no headache, N/V. CTH was completed in the ED concerning for age indeterminate L cerebellar lacunar infarcts. CTA deferred due to CKD, will obtain MRA with MR brain without contrast instead. Admit to stroke neurology for work up under Dr. Canada to stroke tele.     Neuro  #CVA workup  - continue aspirin 81mg and plavix 75mg daily  - continue atorvastatin 80mg daily  - q4hr stroke neuro checks and vitals  - obtain MRI Brain without contrast with vessels (since could not get CTA because of CKD)  - Stroke Code HCT Results: age indeterminate L cerebellar lacunar infarcts  - Stroke Code CTA Results: deferred due to CKD  - Stroke education    Cards  #HTN  - permissive hypertension, Goal SBP <180  - hold home blood pressure medication for now  - obtain TTE  - Stroke Code EKG Results: pending  - follow up Trop- had trop leak to 40 in the ED    #HLD  - high dose statin as above in CVA  - LDL results: pending    Pulm  - call provider if SPO2 < 94%    GI  #Nutrition/Fluids/Electrolytes   - replete K<4 and Mg <2  - Diet: carb controlled    Renal  - CKD- baseline Cr 2  - voiding    Infectious Disease  - afebrile  - Stroke CXR results: pending    Endocrine  #DM  - A1C results: pendng  - ISS  - TSH results: pending    DVT Prophylaxis  - lovenox sq for DVT prophylaxis   - SCDs for DVT prophylaxis      Discussed daily hospital plans and goals with patient and family at bedside. (Called and updated family.)    Discussed with Neurology Attending

## 2023-07-09 NOTE — PATIENT PROFILE ADULT - FALL HARM RISK - HARM RISK INTERVENTIONS
Assistance with ambulation/Assistance OOB with selected safe patient handling equipment/Communicate Risk of Fall with Harm to all staff/Discuss with provider need for PT consult/Monitor gait and stability/Provide patient with walking aids - walker, cane, crutches/Reinforce activity limits and safety measures with patient and family/Sit up slowly, dangle for a short time, stand at bedside before walking/Tailored Fall Risk Interventions/Use of alarms - bed, chair and/or voice tab/Visual Cue: Yellow wristband and red socks/Bed in lowest position, wheels locked, appropriate side rails in place/Call bell, personal items and telephone in reach/Instruct patient to call for assistance before getting out of bed or chair/Non-slip footwear when patient is out of bed/[place] to call system/Physically safe environment - no spills, clutter or unnecessary equipment/Purposeful Proactive Rounding/Room/bathroom lighting operational, light cord in reach

## 2023-07-09 NOTE — ED PROVIDER NOTE - PHYSICAL EXAMINATION
general: Well appearing, in no acute distress  HEENT: Normocephalic, atraumatic, extraocular movements intact  CV: Regular rate  Pulm: No respiratory distress, no tachypnea  Abd: Flat, no gross distension  Ext: warm and well perfused, no LE edema  Skin: No gross rashes or lesions  Neuro: Alert and oriented, moving all extremities

## 2023-07-09 NOTE — ED ADULT NURSE NOTE - OBJECTIVE STATEMENT
Received a 73 year old female with a chief complaint of frequent falls and weakness at home. Patient reports that she has been progressively weak. Patient awake, alert, oriented. Verbally responsive and coherent. Patient denies head and neck pain. Patient lives by herself.

## 2023-07-09 NOTE — PHYSICAL THERAPY INITIAL EVALUATION ADULT - ADDITIONAL COMMENTS
independent prior to arrival, apartment, ramp, no steps, history of 4-5 falls in past month, L handed

## 2023-07-09 NOTE — PHYSICAL THERAPY INITIAL EVALUATION ADULT - ASR WT BEARING STATUS EVAL
AVS reviewed with the patient. Discussed follow-up care, taking medications as prescribed, and when to call 911. VSS. All questions answered. IV Removed per order. Prescriptions sent to patient's pharmacy. Ambulatory out of the department with a steady, unassisted gait.         Theresa Cisse RN  01/21/22 [id number]
no weight-bearing restrictions

## 2023-07-09 NOTE — H&P ADULT - NS ATTEND AMEND GEN_ALL_CORE FT
The patient is a 73 year old female with a PMH of HTN, HLD, T2Dm, and CKD admitted with left-sided weakness, gait instability, and dizziness X1 weak. MRI/A pending. Will obtain TTE, stroke labs. Further stroke eval pending results of MRI. Continue DAPT, statin. SBP: gradual normotension as symptoms >1 week. The patient is a 73 year old female with a PMH of HTN, HLD, T2Dm, and CKD admitted with left-sided weakness, gait instability, and dizziness X1 weak. CTH w significant vasogenic edema of R hemisphere. Will need MRI/A pending. Will obtain TTE, stroke labs. Further stroke eval pending results of MRI. Continue DAPT, statin. SBP: gradual normotension as symptoms >1 week.

## 2023-07-09 NOTE — H&P ADULT - TIME BILLING
Review of objective data, interview/examination of patient, discussion of plan with patient/family, multidisciplinary team.

## 2023-07-09 NOTE — H&P ADULT - NSHPSOCIALHISTORY_GEN_ALL_CORE
SOCIAL HISTORY:   Patient lives with *** at ***.   Smoking status:  Drinking:  Drug Use:    PAST MEDICAL & SURGICAL HISTORY:  HTN (hypertension)  HLD (hyperlipidemia)  DM (diabetes mellitus), type 2  Rheumatoid arthritis  Stage 4 chronic kidney disease  Degenerative joint disease (DJD) of lumbar spine  Osteopenia  S/P total right hip arthroplasty    FAMILY HISTORY:  No pertinent family history in first degree relatives SOCIAL HISTORY:   Patient lives with daughter  Smoking status: none  Drinking: none  Drug Use: none    PAST MEDICAL & SURGICAL HISTORY:  HTN (hypertension)  HLD (hyperlipidemia)  DM (diabetes mellitus), type 2  Rheumatoid arthritis  Stage 4 chronic kidney disease  Degenerative joint disease (DJD) of lumbar spine  Osteopenia  S/P total right hip arthroplasty    FAMILY HISTORY:  No pertinent family history in first degree relatives

## 2023-07-10 DIAGNOSIS — I10 ESSENTIAL (PRIMARY) HYPERTENSION: ICD-10-CM

## 2023-07-10 DIAGNOSIS — E78.5 HYPERLIPIDEMIA, UNSPECIFIED: ICD-10-CM

## 2023-07-10 DIAGNOSIS — N18.4 CHRONIC KIDNEY DISEASE, STAGE 4 (SEVERE): ICD-10-CM

## 2023-07-10 DIAGNOSIS — M06.9 RHEUMATOID ARTHRITIS, UNSPECIFIED: ICD-10-CM
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
## 2023-07-10 DIAGNOSIS — E11.9 TYPE 2 DIABETES MELLITUS WITHOUT COMPLICATIONS: ICD-10-CM

## 2023-07-10 LAB
A1C WITH ESTIMATED AVERAGE GLUCOSE RESULT: 7.4 % — HIGH (ref 4–5.6)
ANION GAP SERPL CALC-SCNC: 11 MMOL/L — SIGNIFICANT CHANGE UP (ref 5–17)
BASOPHILS # BLD AUTO: 0.05 K/UL — SIGNIFICANT CHANGE UP (ref 0–0.2)
BASOPHILS NFR BLD AUTO: 0.6 % — SIGNIFICANT CHANGE UP (ref 0–2)
BUN SERPL-MCNC: 25 MG/DL — HIGH (ref 7–23)
CALCIUM SERPL-MCNC: 9.6 MG/DL — SIGNIFICANT CHANGE UP (ref 8.4–10.5)
CHLORIDE SERPL-SCNC: 103 MMOL/L — SIGNIFICANT CHANGE UP (ref 96–108)
CHOLEST SERPL-MCNC: 143 MG/DL — SIGNIFICANT CHANGE UP
CO2 SERPL-SCNC: 22 MMOL/L — SIGNIFICANT CHANGE UP (ref 22–31)
CREAT SERPL-MCNC: 1.48 MG/DL — HIGH (ref 0.5–1.3)
EGFR: 37 ML/MIN/1.73M2 — LOW
EOSINOPHIL # BLD AUTO: 0.06 K/UL — SIGNIFICANT CHANGE UP (ref 0–0.5)
EOSINOPHIL NFR BLD AUTO: 0.7 % — SIGNIFICANT CHANGE UP (ref 0–6)
ESTIMATED AVERAGE GLUCOSE: 166 MG/DL — HIGH (ref 68–114)
GLUCOSE BLDC GLUCOMTR-MCNC: 159 MG/DL — HIGH (ref 70–99)
GLUCOSE BLDC GLUCOMTR-MCNC: 203 MG/DL — HIGH (ref 70–99)
GLUCOSE BLDC GLUCOMTR-MCNC: 226 MG/DL — HIGH (ref 70–99)
GLUCOSE BLDC GLUCOMTR-MCNC: 264 MG/DL — HIGH (ref 70–99)
GLUCOSE SERPL-MCNC: 164 MG/DL — HIGH (ref 70–99)
HCT VFR BLD CALC: 42.4 % — SIGNIFICANT CHANGE UP (ref 34.5–45)
HDLC SERPL-MCNC: 61 MG/DL — SIGNIFICANT CHANGE UP
HGB BLD-MCNC: 13.5 G/DL — SIGNIFICANT CHANGE UP (ref 11.5–15.5)
IMM GRANULOCYTES NFR BLD AUTO: 0.7 % — SIGNIFICANT CHANGE UP (ref 0–0.9)
LIPID PNL WITH DIRECT LDL SERPL: 69 MG/DL — SIGNIFICANT CHANGE UP
LYMPHOCYTES # BLD AUTO: 1.19 K/UL — SIGNIFICANT CHANGE UP (ref 1–3.3)
LYMPHOCYTES # BLD AUTO: 14.7 % — SIGNIFICANT CHANGE UP (ref 13–44)
MAGNESIUM SERPL-MCNC: 1.3 MG/DL — LOW (ref 1.6–2.6)
MCHC RBC-ENTMCNC: 30.1 PG — SIGNIFICANT CHANGE UP (ref 27–34)
MCHC RBC-ENTMCNC: 31.8 GM/DL — LOW (ref 32–36)
MCV RBC AUTO: 94.4 FL — SIGNIFICANT CHANGE UP (ref 80–100)
MONOCYTES # BLD AUTO: 1.06 K/UL — HIGH (ref 0–0.9)
MONOCYTES NFR BLD AUTO: 13.1 % — SIGNIFICANT CHANGE UP (ref 2–14)
NEUTROPHILS # BLD AUTO: 5.67 K/UL — SIGNIFICANT CHANGE UP (ref 1.8–7.4)
NEUTROPHILS NFR BLD AUTO: 70.2 % — SIGNIFICANT CHANGE UP (ref 43–77)
NON HDL CHOLESTEROL: 82 MG/DL — SIGNIFICANT CHANGE UP
NRBC # BLD: 0 /100 WBCS — SIGNIFICANT CHANGE UP (ref 0–0)
PHOSPHATE SERPL-MCNC: 2.6 MG/DL — SIGNIFICANT CHANGE UP (ref 2.5–4.5)
PLATELET # BLD AUTO: 178 K/UL — SIGNIFICANT CHANGE UP (ref 150–400)
POTASSIUM SERPL-MCNC: 3.9 MMOL/L — SIGNIFICANT CHANGE UP (ref 3.5–5.3)
POTASSIUM SERPL-SCNC: 3.9 MMOL/L — SIGNIFICANT CHANGE UP (ref 3.5–5.3)
RBC # BLD: 4.49 M/UL — SIGNIFICANT CHANGE UP (ref 3.8–5.2)
RBC # FLD: 13 % — SIGNIFICANT CHANGE UP (ref 10.3–14.5)
SODIUM SERPL-SCNC: 136 MMOL/L — SIGNIFICANT CHANGE UP (ref 135–145)
TRIGL SERPL-MCNC: 66 MG/DL — SIGNIFICANT CHANGE UP
TSH SERPL-MCNC: 0.82 UIU/ML — SIGNIFICANT CHANGE UP (ref 0.27–4.2)
WBC # BLD: 8.09 K/UL — SIGNIFICANT CHANGE UP (ref 3.8–10.5)
WBC # FLD AUTO: 8.09 K/UL — SIGNIFICANT CHANGE UP (ref 3.8–10.5)

## 2023-07-10 PROCEDURE — 93306 TTE W/DOPPLER COMPLETE: CPT | Mod: 26

## 2023-07-10 PROCEDURE — 99222 1ST HOSP IP/OBS MODERATE 55: CPT

## 2023-07-10 PROCEDURE — 99233 SBSQ HOSP IP/OBS HIGH 50: CPT | Mod: GC

## 2023-07-10 PROCEDURE — 99233 SBSQ HOSP IP/OBS HIGH 50: CPT

## 2023-07-10 RX ORDER — MAGNESIUM SULFATE 500 MG/ML
2 VIAL (ML) INJECTION ONCE
Refills: 0 | Status: COMPLETED | OUTPATIENT
Start: 2023-07-10 | End: 2023-07-10

## 2023-07-10 RX ORDER — DIPHENHYDRAMINE HCL 50 MG
25 CAPSULE ORAL ONCE
Refills: 0 | Status: COMPLETED | OUTPATIENT
Start: 2023-07-10 | End: 2023-07-10

## 2023-07-10 RX ORDER — LABETALOL HCL 100 MG
10 TABLET ORAL ONCE
Refills: 0 | Status: COMPLETED | OUTPATIENT
Start: 2023-07-10 | End: 2023-07-10

## 2023-07-10 RX ORDER — SULFASALAZINE 500 MG
1000 TABLET ORAL EVERY 12 HOURS
Refills: 0 | Status: DISCONTINUED | OUTPATIENT
Start: 2023-07-10 | End: 2023-07-24

## 2023-07-10 RX ORDER — HYDROXYCHLOROQUINE SULFATE 200 MG
200 TABLET ORAL EVERY 12 HOURS
Refills: 0 | Status: DISCONTINUED | OUTPATIENT
Start: 2023-07-10 | End: 2023-07-24

## 2023-07-10 RX ORDER — IOHEXOL 300 MG/ML
30 INJECTION, SOLUTION INTRAVENOUS ONCE
Refills: 0 | Status: COMPLETED | OUTPATIENT
Start: 2023-07-10 | End: 2023-07-11

## 2023-07-10 RX ADMIN — Medication 650 MILLIGRAM(S): at 16:00

## 2023-07-10 RX ADMIN — Medication 200 MILLIGRAM(S): at 17:49

## 2023-07-10 RX ADMIN — Medication 25 GRAM(S): at 09:12

## 2023-07-10 RX ADMIN — Medication 650 MILLIGRAM(S): at 10:00

## 2023-07-10 RX ADMIN — LOSARTAN POTASSIUM 100 MILLIGRAM(S): 100 TABLET, FILM COATED ORAL at 14:29

## 2023-07-10 RX ADMIN — Medication 1000 MILLIGRAM(S): at 17:49

## 2023-07-10 RX ADMIN — Medication 650 MILLIGRAM(S): at 15:12

## 2023-07-10 RX ADMIN — Medication 650 MILLIGRAM(S): at 09:11

## 2023-07-10 RX ADMIN — LIDOCAINE 1 PATCH: 4 CREAM TOPICAL at 07:03

## 2023-07-10 RX ADMIN — Medication 10 MILLIGRAM(S): at 04:47

## 2023-07-10 RX ADMIN — Medication 6: at 11:14

## 2023-07-10 RX ADMIN — Medication 4: at 16:16

## 2023-07-10 RX ADMIN — AMLODIPINE BESYLATE 10 MILLIGRAM(S): 2.5 TABLET ORAL at 06:59

## 2023-07-10 RX ADMIN — Medication 100 MILLIGRAM(S): at 09:12

## 2023-07-10 RX ADMIN — Medication 2: at 06:56

## 2023-07-10 RX ADMIN — Medication 25 MILLIGRAM(S): at 02:05

## 2023-07-10 RX ADMIN — Medication 25 GRAM(S): at 15:12

## 2023-07-10 RX ADMIN — Medication 10 MILLIGRAM(S): at 00:49

## 2023-07-10 NOTE — CONSULT NOTE ADULT - SUBJECTIVE AND OBJECTIVE BOX
HISTORY OF PRESENT ILLNESS:   73y Female with PMHx of HTN HLD, DM, R hip replacement, RA, CKD (Cr baseline ~2) presents to the  ED for unsteadiness and dizziness x1 week. LKN was a week ago. mRS of 2, walks with a rolling walker but is able to care for her basic everyday needs. NIH of 5 for L facial and dysarthria and LUE weakness. 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 Daughter at bedside states that she is at her baseline. LUE is pain limited to her rheumatoid arthritis but patient states that she definitely has noticed it to be slightly weaker than usual. Otherwise no numbness or tingling, no headache, N/V. CTH was completed in the ED concerning for age indeterminate L cerebellar lacunar infarcts. CTH concerning for age indeterminate L cerebellar lacunar infarcts. MR brain (with motion artifact) but reading 8 mm cortically based nodule in the posterior-superior frontal lobe with extensive vasogenic edema, metastasis vs focus of atypical infection. MRA negative.  Neurosurgery consulted for       PAST MEDICAL & SURGICAL HISTORY:  HTN (hypertension)      HLD (hyperlipidemia)      DM (diabetes mellitus), type 2      Rheumatoid arthritis      Stage 4 chronic kidney disease      Degenerative joint disease (DJD) of lumbar spine      Osteopenia      S/P total right hip arthroplasty        FAMILY HISTORY:  No pertinent family history in first degree relatives        SOCIAL HISTORY:  Tobacco Use: none  EtOH use: none  Substance: none    Allergies    No Known Allergies    Intolerances        REVIEW OF SYSTEMS  All other ROS negative except those noted in HPI      MEDICATIONS:  Antibiotics:    Neuro:  acetaminophen     Tablet .. 650 milliGRAM(s) Oral every 6 hours PRN  ibuprofen  Tablet. 600 milliGRAM(s) Oral once    Anticoagulation:    OTHER:  amLODIPine   Tablet 10 milliGRAM(s) Oral every 24 hours  dextrose 50% Injectable 25 Gram(s) IV Push once  dextrose 50% Injectable 12.5 Gram(s) IV Push once  dextrose 50% Injectable 25 Gram(s) IV Push once  dextrose Oral Gel 15 Gram(s) Oral once PRN  glucagon  Injectable 1 milliGRAM(s) IntraMuscular once  insulin lispro (ADMELOG) corrective regimen sliding scale   SubCutaneous three times a day before meals  insulin lispro (ADMELOG) corrective regimen sliding scale   SubCutaneous at bedtime  lidocaine   4% Patch 1 Patch Transdermal every 24 hours  losartan 100 milliGRAM(s) Oral every 24 hours  metoprolol succinate  milliGRAM(s) Oral every 24 hours    IVF:  dextrose 5%. 1000 milliLiter(s) IV Continuous <Continuous>  dextrose 5%. 1000 milliLiter(s) IV Continuous <Continuous>      Vital Signs Last 24 Hrs  T(C): 36.6 (10 Jul 2023 09:17), Max: 37.2 (10 Jul 2023 05:03)  T(F): 97.8 (10 Jul 2023 09:17), Max: 99 (10 Jul 2023 05:03)  HR: 79 (10 Jul 2023 10:54) (70 - 90)  BP: 130/58 (10 Jul 2023 10:54) (122/77 - 197/91)  BP(mean): 84 (10 Jul 2023 10:54) (84 - 130)  RR: 34 (10 Jul 2023 10:54) (19 - 34)  SpO2: 96% (10 Jul 2023 10:54) (94% - 99%)    Parameters below as of 10 Jul 2023 10:54  Patient On (Oxygen Delivery Method): room air        PHYSICAL EXAM:      LABS:                        13.5   8.09  )-----------( 178      ( 10 Jul 2023 05:30 )             42.4     07-10    136  |  103  |  25<H>  ----------------------------<  164<H>  3.9   |  22  |  1.48<H>    Ca    9.6      10 Jul 2023 05:30  Phos  2.6     07-10  Mg     1.3     07-10        Urinalysis Basic - ( 10 Jul 2023 05:30 )    Color: x / Appearance: x / SG: x / pH: x  Gluc: 164 mg/dL / Ketone: x  / Bili: x / Urobili: x   Blood: x / Protein: x / Nitrite: x   Leuk Esterase: x / RBC: x / WBC x   Sq Epi: x / Non Sq Epi: x / Bacteria: x      CULTURES:      RADIOLOGY & ADDITIONAL STUDIES:  < from: MR Head No Cont (07.09.23 @ 16:07) >  An 8 mm cortically based nodule in the posterior-superior frontal lobe   (motor strip) is present with extensive vasogenic edema. Metastasis or   focus of atypical infection are leading differential considerations. The   patient should return for contrast-enhanced MR imaging (unless   contraindicated) to assess for multiplicity.    No recent infarct. Chronic infarcts and white matter microangiopathic   change as above.    < end of copied text >  < from: MR Angio Head No Cont (07.09.23 @ 16:07) >  Unremarkable MRA examination of the brain.    < end of copied text >  < from: CT Head No Cont (07.09.23 @ 02:42) >  No acute intracranial hemorrhage, acute demarcated territorial   infarction, or masslike lesion. Age indeterminant left cerebellar and   left internal capsule lacunar infarcts.    < end of copied text >          Assessment:  73y Female with PMHx of HTN HLD, DM, R hip replacement, RA, CKD (Cr baseline ~2) presents to the  ED for unsteadiness and dizziness x1 week. LKN was a week ago. mRS of 2, walks with a rolling walker but is able to care for her basic everyday needs. NIH of 5 for L facial and dysarthria and LUE weakness. 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 Daughter at bedside states that she is at her baseline. LUE is pain limited to her rheumatoid arthritis but patient states that she definitely has noticed it to be slightly weaker than usual. Otherwise no numbness or tingling, no headache, N/V. CTH was completed in the ED concerning for age indeterminate L cerebellar lacunar infarcts. CTA deferred due to CKD, primary team will obtain MRA with MR brain without contrast instead.     Plan:  - Obtain [facility] MRI brain with contrast  - Obtain CT CAP  - Defer steroid treatment until MRI brain with contrast is obtained  - Obtain oncology and medicine consults     D/w Dr. D'Amico   HISTORY OF PRESENT ILLNESS:   73y Female with PMHx of HTN HLD, DM, R hip replacement, RA, CKD (Cr baseline ~2) presents to the  ED for unsteadiness and dizziness x1 week. LKN was a week ago. mRS of 2, walks with a rolling walker but is able to care for her basic everyday needs. NIH of 5 for L facial and dysarthria and LUE weakness. 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 Daughter at bedside states that she is at her baseline. LUE is pain limited to her rheumatoid arthritis but patient states that she definitely has noticed it to be slightly weaker than usual. Otherwise no numbness or tingling, no headache, N/V. CTH was completed in the ED concerning for age indeterminate L cerebellar lacunar infarcts. CTH concerning for age indeterminate L cerebellar lacunar infarcts. MR brain (with motion artifact) but reading 8 mm cortically based nodule in the posterior-superior frontal lobe with extensive vasogenic edema, metastasis vs focus of atypical infection. MRA negative.  Neurosurgery consulted for       PAST MEDICAL & SURGICAL HISTORY:  HTN (hypertension)      HLD (hyperlipidemia)      DM (diabetes mellitus), type 2      Rheumatoid arthritis      Stage 4 chronic kidney disease      Degenerative joint disease (DJD) of lumbar spine      Osteopenia      S/P total right hip arthroplasty        FAMILY HISTORY:  No pertinent family history in first degree relatives        SOCIAL HISTORY:  Tobacco Use: none  EtOH use: none  Substance: none    Allergies    No Known Allergies    Intolerances        REVIEW OF SYSTEMS  All other ROS negative except those noted in HPI      MEDICATIONS:  Antibiotics:    Neuro:  acetaminophen     Tablet .. 650 milliGRAM(s) Oral every 6 hours PRN  ibuprofen  Tablet. 600 milliGRAM(s) Oral once    Anticoagulation:    OTHER:  amLODIPine   Tablet 10 milliGRAM(s) Oral every 24 hours  dextrose 50% Injectable 25 Gram(s) IV Push once  dextrose 50% Injectable 12.5 Gram(s) IV Push once  dextrose 50% Injectable 25 Gram(s) IV Push once  dextrose Oral Gel 15 Gram(s) Oral once PRN  glucagon  Injectable 1 milliGRAM(s) IntraMuscular once  insulin lispro (ADMELOG) corrective regimen sliding scale   SubCutaneous three times a day before meals  insulin lispro (ADMELOG) corrective regimen sliding scale   SubCutaneous at bedtime  lidocaine   4% Patch 1 Patch Transdermal every 24 hours  losartan 100 milliGRAM(s) Oral every 24 hours  metoprolol succinate  milliGRAM(s) Oral every 24 hours    IVF:  dextrose 5%. 1000 milliLiter(s) IV Continuous <Continuous>  dextrose 5%. 1000 milliLiter(s) IV Continuous <Continuous>      Vital Signs Last 24 Hrs  T(C): 36.6 (10 Jul 2023 09:17), Max: 37.2 (10 Jul 2023 05:03)  T(F): 97.8 (10 Jul 2023 09:17), Max: 99 (10 Jul 2023 05:03)  HR: 79 (10 Jul 2023 10:54) (70 - 90)  BP: 130/58 (10 Jul 2023 10:54) (122/77 - 197/91)  BP(mean): 84 (10 Jul 2023 10:54) (84 - 130)  RR: 34 (10 Jul 2023 10:54) (19 - 34)  SpO2: 96% (10 Jul 2023 10:54) (94% - 99%)    Parameters below as of 10 Jul 2023 10:54  Patient On (Oxygen Delivery Method): room air        PHYSICAL EXAM:      LABS:                        13.5   8.09  )-----------( 178      ( 10 Jul 2023 05:30 )             42.4     07-10    136  |  103  |  25<H>  ----------------------------<  164<H>  3.9   |  22  |  1.48<H>    Ca    9.6      10 Jul 2023 05:30  Phos  2.6     07-10  Mg     1.3     07-10        Urinalysis Basic - ( 10 Jul 2023 05:30 )    Color: x / Appearance: x / SG: x / pH: x  Gluc: 164 mg/dL / Ketone: x  / Bili: x / Urobili: x   Blood: x / Protein: x / Nitrite: x   Leuk Esterase: x / RBC: x / WBC x   Sq Epi: x / Non Sq Epi: x / Bacteria: x      CULTURES:      RADIOLOGY & ADDITIONAL STUDIES:  < from: MR Head No Cont (07.09.23 @ 16:07) >  An 8 mm cortically based nodule in the posterior-superior frontal lobe   (motor strip) is present with extensive vasogenic edema. Metastasis or   focus of atypical infection are leading differential considerations. The   patient should return for contrast-enhanced MR imaging (unless   contraindicated) to assess for multiplicity.    No recent infarct. Chronic infarcts and white matter microangiopathic   change as above.    < end of copied text >  < from: MR Angio Head No Cont (07.09.23 @ 16:07) >  Unremarkable MRA examination of the brain.    < end of copied text >  < from: CT Head No Cont (07.09.23 @ 02:42) >  No acute intracranial hemorrhage, acute demarcated territorial   infarction, or masslike lesion. Age indeterminant left cerebellar and   left internal capsule lacunar infarcts.    < end of copied text >          Assessment:  73y Female with PMHx of HTN HLD, DM, R hip replacement, RA, CKD (Cr baseline ~2) presents to the  ED for unsteadiness and dizziness x1 week. LKN was a week ago. mRS of 2, walks with a rolling walker but is able to care for her basic everyday needs. NIH of 5 for L facial and dysarthria and LUE weakness. 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 Daughter at bedside states that she is at her baseline. LUE is pain limited to her rheumatoid arthritis but patient states that she definitely has noticed it to be slightly weaker than usual. Otherwise no numbness or tingling, no headache, N/V. CTH was completed in the ED concerning for age indeterminate L cerebellar lacunar infarcts. CTA deferred due to CKD, primary team will obtain MRA with MR brain without contrast instead.     Plan:  - Obtain [facility] MRI brain with contrast  - Obtain CT CAP  - Hold steroids for now  - Obtain oncology and medicine consults     D/w Dr. D'Amico   HISTORY OF PRESENT ILLNESS:   73y Female with PMHx of HTN HLD, DM, R hip replacement, RA, CKD (Cr baseline ~2) presents to the  ED for unsteadiness and dizziness x1 week. LKN was a week ago. mRS of 2, walks with a rolling walker but is able to care for her basic everyday needs. NIH of 5 for L facial and dysarthria and LUE weakness. 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 Daughter at bedside states that she is at her baseline. LUE is pain limited to her rheumatoid arthritis but patient states that she definitely has noticed it to be slightly weaker than usual. Otherwise no numbness or tingling, no headache, N/V. CTH was completed in the ED concerning for age indeterminate L cerebellar lacunar infarcts. CTH concerning for age indeterminate L cerebellar lacunar infarcts. MR brain (with motion artifact) but reading 8 mm cortically based nodule in the posterior-superior frontal lobe with extensive vasogenic edema, metastasis vs focus of atypical infection. MRA negative.  Neurosurgery consulted for       PAST MEDICAL & SURGICAL HISTORY:  HTN (hypertension)      HLD (hyperlipidemia)      DM (diabetes mellitus), type 2      Rheumatoid arthritis      Stage 4 chronic kidney disease      Degenerative joint disease (DJD) of lumbar spine      Osteopenia      S/P total right hip arthroplasty        FAMILY HISTORY:  No pertinent family history in first degree relatives        SOCIAL HISTORY:  Tobacco Use: former smoker  EtOH use: occasional drink last on saturday  Substance: +cocaine use, last on saturday    Allergies    No Known Allergies    Intolerances        REVIEW OF SYSTEMS  All other ROS negative except those noted in HPI      MEDICATIONS:  Antibiotics:    Neuro:  acetaminophen     Tablet .. 650 milliGRAM(s) Oral every 6 hours PRN  ibuprofen  Tablet. 600 milliGRAM(s) Oral once    Anticoagulation:    OTHER:  amLODIPine   Tablet 10 milliGRAM(s) Oral every 24 hours  dextrose 50% Injectable 25 Gram(s) IV Push once  dextrose 50% Injectable 12.5 Gram(s) IV Push once  dextrose 50% Injectable 25 Gram(s) IV Push once  dextrose Oral Gel 15 Gram(s) Oral once PRN  glucagon  Injectable 1 milliGRAM(s) IntraMuscular once  insulin lispro (ADMELOG) corrective regimen sliding scale   SubCutaneous three times a day before meals  insulin lispro (ADMELOG) corrective regimen sliding scale   SubCutaneous at bedtime  lidocaine   4% Patch 1 Patch Transdermal every 24 hours  losartan 100 milliGRAM(s) Oral every 24 hours  metoprolol succinate  milliGRAM(s) Oral every 24 hours    IVF:  dextrose 5%. 1000 milliLiter(s) IV Continuous <Continuous>  dextrose 5%. 1000 milliLiter(s) IV Continuous <Continuous>      Vital Signs Last 24 Hrs  T(C): 36.6 (10 Jul 2023 09:17), Max: 37.2 (10 Jul 2023 05:03)  T(F): 97.8 (10 Jul 2023 09:17), Max: 99 (10 Jul 2023 05:03)  HR: 79 (10 Jul 2023 10:54) (70 - 90)  BP: 130/58 (10 Jul 2023 10:54) (122/77 - 197/91)  BP(mean): 84 (10 Jul 2023 10:54) (84 - 130)  RR: 34 (10 Jul 2023 10:54) (19 - 34)  SpO2: 96% (10 Jul 2023 10:54) (94% - 99%)    Parameters below as of 10 Jul 2023 10:54  Patient On (Oxygen Delivery Method): room air        PHYSICAL EXAM:  Constitutional:  74 y/o female awake, alert in no acute distress.  Eyes:  Sclera anicteric, conjunctiva noninjected.  ENMT: Oropharyngeal mucosa moist, pink. Tongue midline.    Neck: Neck supple, FROM.  No appreciable lymphadenopathy.  Back:  No pain to palpation/percussion of low back. No CVA tenderness.  Respiratory: Clear to auscultation bilaterally.  No rales, rhonchi, wheezes.  Cardiovascular: Regular rate and rhythm.  S1, S2 heard.  Gastrointestinal:  Soft, nontender, nondistended.  +BS.  Genitourinary:  Deferred.  Rectal: Deferred.  Vascular: Extremities warm, no ulcers, no discoloration of skin.   Neurological: Gen: AA&O x 3, conversant, appropriate.      CN II-XII +left facial droop, pupils 3mm briskly reactive b/l, EOMI, grossly intact.    Motor: DIAS x 4, 5/5 RU and RLE, LUE wiggling fingers, L HG 3/5, L biceps/triceps 2/5, LLE 5/5 HF/KE/KF, DFPF 4/5     Sens: Sensation intact to light touch throughout.    DTRs: 2+ symmetric throughout.    Hoffmans negative bilaterally.  Plantar downgoing bilaterally.  No clonus.      No pronator drift, no dysmetria.  Skin: Warm, dry, no erythema.      LABS:                        13.5   8.09  )-----------( 178      ( 10 Jul 2023 05:30 )             42.4     07-10    136  |  103  |  25<H>  ----------------------------<  164<H>  3.9   |  22  |  1.48<H>    Ca    9.6      10 Jul 2023 05:30  Phos  2.6     07-10  Mg     1.3     07-10        Urinalysis Basic - ( 10 Jul 2023 05:30 )    Color: x / Appearance: x / SG: x / pH: x  Gluc: 164 mg/dL / Ketone: x  / Bili: x / Urobili: x   Blood: x / Protein: x / Nitrite: x   Leuk Esterase: x / RBC: x / WBC x   Sq Epi: x / Non Sq Epi: x / Bacteria: x      CULTURES:      RADIOLOGY & ADDITIONAL STUDIES:  < from: MR Head No Cont (07.09.23 @ 16:07) >  An 8 mm cortically based nodule in the posterior-superior frontal lobe   (motor strip) is present with extensive vasogenic edema. Metastasis or   focus of atypical infection are leading differential considerations. The   patient should return for contrast-enhanced MR imaging (unless   contraindicated) to assess for multiplicity.    No recent infarct. Chronic infarcts and white matter microangiopathic   change as above.    < end of copied text >  < from: MR Angio Head No Cont (07.09.23 @ 16:07) >  Unremarkable MRA examination of the brain.    < end of copied text >  < from: CT Head No Cont (07.09.23 @ 02:42) >  No acute intracranial hemorrhage, acute demarcated territorial   infarction, or masslike lesion. Age indeterminant left cerebellar and   left internal capsule lacunar infarcts.    < end of copied text >          Assessment:  73y Female with PMHx of HTN HLD, DM, R hip replacement, RA, CKD (Cr baseline ~2) presents to the  ED for unsteadiness and dizziness x1 week. LKN was a week ago. mRS of 2, walks with a rolling walker but is able to care for her basic everyday needs. NIH of 5 for L facial and dysarthria and LUE weakness. 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 Daughter at bedside states that she is at her baseline. LUE is pain limited to her rheumatoid arthritis but patient states that she definitely has noticed it to be slightly weaker than usual. Otherwise no numbness or tingling, no headache, N/V. CTH was completed in the ED concerning for age indeterminate L cerebellar lacunar infarcts. MR brain 8 mm cortically based nodule in the posterior-superior frontal lobe with extensive vasogenic edema, metastasis vs focus of atypical infection.     Plan:  - Obtain [facility] MRI brain with contrast  - Obtain CT CAP  - Hold steroids for now  - Obtain oncology and medicine consults     D/w Dr. D'Amico

## 2023-07-10 NOTE — OCCUPATIONAL THERAPY INITIAL EVALUATION ADULT - PERTINENT HX OF CURRENT PROBLEM, REHAB EVAL
73y Female with PMHx of HTN HLD, DM, R hip replacement, RA, CKD (Cr baseline ~2) presents to the  ED for unsteadiness and dizziness x1 week. LKN was a week ago. mRS of 2, walks with a rolling walker but is able to care for her basic everyday needs. NIH of 5 for L facial and dysarthria and LUE weakness. 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 Daughter at bedside states that she is at her baseline. LUE is pain limited to her rheumatoid arthritis but patient states that she definitely has noticed it to be slightly weaker than usual. Otherwise no numbness or tingling, no headache, N/V. CTH was completed in the ED concerning for age indeterminate L cerebellar lacunar infarcts. CTA deferred due to CKD, will obtain MRA with MR brain without contrast instead. Admit to stroke neurology for work up under Dr. Canada to stroke tele.

## 2023-07-10 NOTE — OCCUPATIONAL THERAPY INITIAL EVALUATION ADULT - BED MOBILITY LIMITATIONS, REHAB EVAL
Patient sat at EOB for 10 mins with Min A x 1 to maintain sitting balance 2/2 L sided lean and c/o of L shoulder pain throughout

## 2023-07-10 NOTE — PROGRESS NOTE ADULT - SUBJECTIVE AND OBJECTIVE BOX
***TRANSFER FROM STROKE TELE TO MEDICINE***  Assessment: 73y Female with PMHx of HTN HLD, DM, R hip replacement, RA, CKD (Cr baseline ~2) presented to the  ED for unsteadiness and dizziness x1 week. LKN was a week ago. mRS of 2, walks with a rolling walker but is able to care for her basic everyday needs. NIH of 5 for L facial and dysarthria and LUE weakness. 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 Assessment of LUE is limited due to pain 2/2 to her rheumatoid arthritis however patient states that she definitely has noticed it to be slightly weaker than usual. Otherwise no numbness or tingling, no headache, N/V. CTH was completed in the ED concerning for age indeterminate L cerebellar lacunar infarcts, addendum added and patient was found to have area of vasogenic edema in the right frontoparietal region with associated sulcal effacement which could indicate underlying mass. CTA deferred due to CKD, MRA obtained without evidence of LVO or high grade stenosis. Admitted to stroke [facility] for further w/u, started on DAPT and high dose statin. MRI without contrast with 8 mm cortically based nodule in the posterior-superior frontal lobe (motor strip) is present with extensive vasogenic edema, no acute infarct. MRI w/ contrast ordered to further evaluate potential mass. DAPT and statin discontinued to decrease risk of ICH. Restarted patient's home RA meds (Plaquenil, Sulfasalazine). Neurosurgery consulted, recommended CT chest, abdomen and pelvis to r/o malignancy. Transferred to medicine under Dr. Douglass for malignancy w/u.    Neurology Stroke Progress Note    INTERVAL HPI/OVERNIGHT EVENTS:  Patient seen and examined. O/N patient required IVP Hydral x 2 for elevated SBPs. Also noted to have developed hives, states she's sensitive to new detergents, got Benadryl 25 mg PO. Symptoms resolved.     MEDICATIONS  (STANDING):  amLODIPine   Tablet 10 milliGRAM(s) Oral every 24 hours  dextrose 5%. 1000 milliLiter(s) (50 mL/Hr) IV Continuous <Continuous>  dextrose 5%. 1000 milliLiter(s) (100 mL/Hr) IV Continuous <Continuous>  dextrose 50% Injectable 25 Gram(s) IV Push once  dextrose 50% Injectable 12.5 Gram(s) IV Push once  dextrose 50% Injectable 25 Gram(s) IV Push once  glucagon  Injectable 1 milliGRAM(s) IntraMuscular once  hydroxychloroquine 200 milliGRAM(s) Oral every 12 hours  ibuprofen  Tablet. 600 milliGRAM(s) Oral once  insulin lispro (ADMELOG) corrective regimen sliding scale   SubCutaneous three times a day before meals  insulin lispro (ADMELOG) corrective regimen sliding scale   SubCutaneous at bedtime  iohexol 300 mG (iodine)/mL Oral Solution 30 milliLiter(s) Oral once  lidocaine   4% Patch 1 Patch Transdermal every 24 hours  losartan 100 milliGRAM(s) Oral every 24 hours  metoprolol succinate  milliGRAM(s) Oral every 24 hours  sulfaSALAzine 1000 milliGRAM(s) Oral every 12 hours    MEDICATIONS  (PRN):  acetaminophen     Tablet .. 650 milliGRAM(s) Oral every 6 hours PRN Temp greater or equal to 38.5C (101.3F), Mild Pain (1 - 3), Moderate Pain (4 - 6)  dextrose Oral Gel 15 Gram(s) Oral once PRN Blood Glucose LESS THAN 70 milliGRAM(s)/deciliter      Allergies    No Known Allergies    Intolerances        Vital Signs Last 24 Hrs  T(C): 36.8 (10 Jul 2023 18:15), Max: 37.2 (10 Jul 2023 05:03)  T(F): 98.3 (10 Jul 2023 18:15), Max: 99 (10 Jul 2023 05:03)  HR: 61 (10 Jul 2023 17:54) (61 - 90)  BP: 114/63 (10 Jul 2023 17:54) (114/63 - 197/91)  BP(mean): 83 (10 Jul 2023 17:54) (83 - 130)  RR: 20 (10 Jul 2023 17:54) (19 - 34)  SpO2: 95% (10 Jul 2023 17:54) (94% - 99%)    Parameters below as of 10 Jul 2023 17:54  Patient On (Oxygen Delivery Method): room air        Physical exam:  General: Mild painful distress, awake and alert  Eyes: Anicteric sclerae, moist conjunctivae, see below for CNs  Extremities: No edema    Neurologic:  -Mental status: Awake, alert, oriented to person, place, and time. Speech is fluent with intact naming, repetition, and comprehension, mild dysarthria. Follows commands. Attention/concentration intact. Fund of knowledge appropriate.  -Cranial nerves:   II: Visual fields are full to confrontation.  III, IV, VI: Extraocular movements are intact without nystagmus. Pupils equally round and reactive to light  V:  Facial sensation V1-V3 equal and intact   VII: L facial droop  XII: Tongue protrudes midline  Motor: Normal bulk and tone. No pronator drift. RUE and RLE 5/5. LUE 2/5 - exam limited 2/2 to pain, can wiggle fingers and bend at the elbow, unable to lift off the bed antigravity. LLE 4/5 with slight drift.  Sensation: Intact to light touch bilaterally. No neglect or extinction on double simultaneous testing.  Coordination: Unable to assess 2/2 to pain  Gait: Deferred    LABS:                        13.5   8.09  )-----------( 178      ( 10 Jul 2023 05:30 )             42.4     07-10    136  |  103  |  25<H>  ----------------------------<  164<H>  3.9   |  22  |  1.48<H>    Ca    9.6      10 Jul 2023 05:30  Phos  2.6     07-10  Mg     1.3     07-10        Urinalysis Basic - ( 10 Jul 2023 05:30 )    Color: x / Appearance: x / SG: x / pH: x  Gluc: 164 mg/dL / Ketone: x  / Bili: x / Urobili: x   Blood: x / Protein: x / Nitrite: x   Leuk Esterase: x / RBC: x / WBC x   Sq Epi: x / Non Sq Epi: x / Bacteria: x        RADIOLOGY & ADDITIONAL TESTS:  < from: MR Head No Cont (07.09.23 @ 16:07) >    IMPRESSION:    An 8 mm cortically based nodule in the posterior-superior frontal lobe   (motor strip) is present with extensive vasogenic edema. Metastasis or   focus of atypical infection are leading differential considerations. The   patient should return for contrast-enhanced MR imaging (unless   contraindicated) to assess for multiplicity.    No recent infarct. Chronic infarcts and white matter microangiopathic   change as above.    < end of copied text >    < from: TTE Echo Complete w/o Contrast w/ Doppler (07.10.23 @ 13:25) >  CONCLUSIONS:     1. Normal left ventricular size and systolic function.   2. Moderate symmetric left ventricular hypertrophy.   3. Normal right ventricular size and systolic function.   4. Normal atria.   5. Aortic sclerosis without significant stenosis.   6. No evidence of pulmonary hypertension.   7. No pericardial effusion.   8. No prior echo is available for comparison.    < end of copied text >

## 2023-07-10 NOTE — OCCUPATIONAL THERAPY INITIAL EVALUATION ADULT - GENERAL OBSERVATIONS, REHAB EVAL
Rehab hubert Maharaj present. Patient A&Ox3, agreeable and tolerated session poorly. Patient received semisupine in bed +tele, +IV, +primafit, c/o of L shoulder pain 8/10 at rest, room air, NAD.

## 2023-07-10 NOTE — OCCUPATIONAL THERAPY INITIAL EVALUATION ADULT - PLANNED THERAPY INTERVENTIONS, OT EVAL
ADL retraining/balance training/bed mobility training/fine motor coordination training/joint mobilization/neuromuscular re-education/ROM/strengthening/transfer training

## 2023-07-10 NOTE — CONSULT NOTE ADULT - SUBJECTIVE AND OBJECTIVE BOX
Patient is a 73y old  Female who presents with a chief complaint of Stroke (09 Jul 2023 13:25)      HPI:   **STROKE HPI***    HPI: 73y Female with PMHx of HTN HLD, DM, R hip replacement, RA, CKD (Cr baseline ~2) presents to the  ED for unsteadiness and dizziness x1 week. LKN was a week ago. mRS of 2, walks with a rolling walker but is able to care for her basic everyday needs. NIH of 5 for L facial and dysarthria and LUE weakness. 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 Daughter at bedside states that she is at her baseline. LUE is pain limited to her rheumatoid arthritis but patient states that she definitely has noticed it to be slightly weaker than usual. Otherwise no numbness or tingling, no headache, N/V. CTH was completed in the ED concerning for age indeterminate L cerebellar lacunar infarcts.       (09 Jul 2023 03:04)    PAST MEDICAL & SURGICAL HISTORY:  HTN (hypertension)      HLD (hyperlipidemia)      DM (diabetes mellitus), type 2      Rheumatoid arthritis      Stage 4 chronic kidney disease      Degenerative joint disease (DJD) of lumbar spine      Osteopenia      S/P total right hip arthroplasty        MEDICATIONS  (STANDING):  amLODIPine   Tablet 10 milliGRAM(s) Oral every 24 hours  atorvastatin 80 milliGRAM(s) Oral at bedtime  dextrose 5%. 1000 milliLiter(s) (100 mL/Hr) IV Continuous <Continuous>  dextrose 5%. 1000 milliLiter(s) (50 mL/Hr) IV Continuous <Continuous>  dextrose 50% Injectable 25 Gram(s) IV Push once  dextrose 50% Injectable 12.5 Gram(s) IV Push once  dextrose 50% Injectable 25 Gram(s) IV Push once  glucagon  Injectable 1 milliGRAM(s) IntraMuscular once  ibuprofen  Tablet. 600 milliGRAM(s) Oral once  insulin lispro (ADMELOG) corrective regimen sliding scale   SubCutaneous three times a day before meals  insulin lispro (ADMELOG) corrective regimen sliding scale   SubCutaneous at bedtime  lidocaine   4% Patch 1 Patch Transdermal every 24 hours  losartan 100 milliGRAM(s) Oral every 24 hours  magnesium sulfate  IVPB 2 Gram(s) IV Intermittent once  metoprolol succinate  milliGRAM(s) Oral every 24 hours    MEDICATIONS  (PRN):  acetaminophen     Tablet .. 650 milliGRAM(s) Oral every 6 hours PRN Temp greater or equal to 38.5C (101.3F), Mild Pain (1 - 3), Moderate Pain (4 - 6)  dextrose Oral Gel 15 Gram(s) Oral once PRN Blood Glucose LESS THAN 70 milliGRAM(s)/deciliter          Social History:              - single , daughter lives in [place]          -  lives alone in an elevator accessible apartment building ,  0 steps to enter          -  Prior Home Care Services :  none             -  Family support : daughter     Baseline Functional Level Prior to Admission :             - ADL's/ IADL's :  independent           - Ambulatory status prior to admission :   walked with a rollator         FAMILY HISTORY:  No pertinent family history in first degree relatives        CBC Full  -  ( 10 Jul 2023 05:30 )  WBC Count : 8.09 K/uL  RBC Count : 4.49 M/uL  Hemoglobin : 13.5 g/dL  Hematocrit : 42.4 %  Platelet Count - Automated : 178 K/uL  Mean Cell Volume : 94.4 fl  Mean Cell Hemoglobin : 30.1 pg  Mean Cell Hemoglobin Concentration : 31.8 gm/dL  Auto Neutrophil # : 5.67 K/uL  Auto Lymphocyte # : 1.19 K/uL  Auto Monocyte # : 1.06 K/uL  Auto Eosinophil # : 0.06 K/uL  Auto Basophil # : 0.05 K/uL  Auto Neutrophil % : 70.2 %  Auto Lymphocyte % : 14.7 %  Auto Monocyte % : 13.1 %  Auto Eosinophil % : 0.7 %  Auto Basophil % : 0.6 %      07-10    136  |  103  |  25<H>  ----------------------------<  164<H>  3.9   |  22  |  1.48<H>    Ca    9.6      10 Jul 2023 05:30  Phos  2.6     07-10  Mg     1.3     07-10        Urinalysis Basic - ( 10 Jul 2023 05:30 )    Color: x / Appearance: x / SG: x / pH: x  Gluc: 164 mg/dL / Ketone: x  / Bili: x / Urobili: x   Blood: x / Protein: x / Nitrite: x   Leuk Esterase: x / RBC: x / WBC x   Sq Epi: x / Non Sq Epi: x / Bacteria: x        Radiology :     < from: MR Head No Cont (07.09.23 @ 16:07) >  ACC: [id number] EXAM:  MR BRAIN   ORDERED BY: JON MACK     PROCEDURE DATE:  07/09/2023          INTERPRETATION:  PROCEDURE: MRI Brain without contrast    INDICATION: Vasogenic edema on CT head    TECHNIQUE: Multi-planar multi-sequential 3.0 Monica MR imaging of the   brain was performed without intravenous contrast.    COMPARISON: Correlation made with CT head from 7/9/2023    FINDINGS:    Exam is motion degraded.    There is age-indeterminate edema in the right superior/posterior frontal   lobe and parietal lobes. An 8 mm targetoid lesion is suspected at cortex   of the far posterior precentral gyrus on series 24, image 30; series 10,   image 29; suspicious for metastasis or possible atypical infection.   Contrast imaging is advised to assess for multiplicity. Diffusion imaging   is negative at this site and elsewhere.    Ventricles are not enlarged. No extra-axial collection or midline shift.    Elsewhere there is scattered punctate and confluent areas of T2-FLAIR   signal hyperintensity in the periventricular and subcortical white matter   most consistent with chronic small vessel ischemic disease, also seen in   the juan daniel. Chronic lacunar infarct in the left cerebellar hemisphere, and   left thalamus, internal capsule and caudate. Chronic cortical infarct   involves the right occipital lobe.    The susceptibility weighted images demonstrate no parenchymal blood   products allowing for motion limitation. There are preserved vascular   flow-voids.    The visualized paranasal sinuses are free of mucosal disease. The mastoid   air cells are well-aerated. 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      IMPRESSION:    An 8 mm cortically based nodule in the posterior-superior frontal lobe   (motor strip) is present with extensive vasogenic edema. Metastasis or   focus of atypical infection are leading differential considerations. The   patient should return for contrast-enhanced MR imaging (unless   contraindicated) to assess for multiplicity.    No recent infarct. Chronic infarcts and white matter microangiopathic   change as above.     < from: Xray Shoulder 2 Views, Left (07.09.23 @ 06:41) >  ACC: [id number] EXAM:  XR SHOULDER COMP MIN 2V LT   ORDERED BY: JON MACK     PROCEDURE DATE:  07/09/2023          INTERPRETATION:  Clinical History: Pain    2 views of the left shoulder demonstrates no evidence of acute fracture   or dislocation.    IMPRESSION: No evidence of acute fracture.         Review of Systems : per HPI               Vital Signs Last 24 Hrs  T(C): 37.2 (10 Jul 2023 05:03), Max: 37.2 (09 Jul 2023 10:55)  T(F): 99 (10 Jul 2023 05:03), Max: 99 (09 Jul 2023 10:55)  HR: 90 (10 Jul 2023 08:40) (70 - 90)  BP: 122/77 (10 Jul 2023 08:40) (122/77 - 197/91)  BP(mean): 92 (10 Jul 2023 08:40) (92 - 130)  RR: 24 (10 Jul 2023 08:40) (19 - 32)  SpO2: 94% (10 Jul 2023 08:40) (94% - 99%)    Parameters below as of 10 Jul 2023 08:40  Patient On (Oxygen Delivery Method): room air            Physical Exam : pleasant 73 y o woman  lying comfortably in semi Zee's position , awake , alert ,  c/o L shoulder pain     Head : normocephalic , atraumatic    Eyes : PERRLA , EOMI , no nystagmus , sclera anicteric    ENT / FACE : neg nasal discharge , uvula midline , no oropharyngeal erythema / exudate    Neck : supple , negative JVD , negative carotid bruits , no thyromegaly    Chest : CTA bilaterally , neg wheeze / rhonchi / rales / crackles / egophany    Cardiovascular : regular rate and rhythm , neg murmurs / rubs / gallops    Abdomen : soft , non distended , non tender to palpation in all 4 quadrants ,  normal bowel sounds     Extremities : WWP , neg cyanosis /clubbing / edema     Musculoskeletal :   L shoulder : no ecchymosis, anterior lidocaine patch , unable to range shoulder in any plane 2/2 pain ( reports h/o falls)          Neurologic Exam :    Alert and oriented to person , place , date/year , speech fluent w/ slight dysarthria , follows commands , recent and remote memory intact , repetition intact , comprehension intact ,  attention/concentration intact , fund of knowledge appropriate    Cranial Nerves:     II :                         pupils equal , round and reactive to light , visual fields intact   III/ IV/VI :              extraocular movements intact , neg nystagmus , neg ptosis  V :                        facial sensation intact , V1-3 normal  VII :                      L droop , normal eye closure   VIII :                     hearing intact to finger rub bilaterally  IX and X :             no hoarseness , gag intact , palate/ uvula rise symmetrically  XI :                      unable to L shrug 2/2 pain   XII :                      no tongue deviation    Motor Exam:          Right UE:               no focal weakness ,  > 3+/5 throughout  , no drift     Left UE:  pain limited proximally ,  3-/5, bic 3-/5         Right LE:    no focal weakness ,  > 4/5 throughout        Left LE:    no focal weakness ,  > 3-/5 throughout         Sensation :         intact to light touch x 4 extremities                            no neglect or extinction on double simultaneous testing      DTR :     biceps/brachioradialis : equal                      patella/ankle : equal     neg Babinski        Gait :  not tested          PM&R Impression :     admitted for c/o dizziness, unsteady gait , MRI brain revealed a nodule in the posterior-superior frontal lobe with vasogenic edema     - reports h/o falls at home with L shoulder pain, XR - for fx, probable RCT , consider CT L shoulder , REC L shoulder sling    - no acute infarcts on MRI brain            Disposition plan recommendation :     acute rehab placement

## 2023-07-10 NOTE — OCCUPATIONAL THERAPY INITIAL EVALUATION ADULT - MANUAL MUSCLE TESTING RESULTS, REHAB EVAL
RUE 4/5, LUE shoulder CALVIN 2/2 pain, L elbow flexion 3/5, extension 1/5, wrist flexion 2-/5, extension 1/5, supination/pronation 2-/5, RLE 4/5, LLE 3+/5 throughout

## 2023-07-10 NOTE — OCCUPATIONAL THERAPY INITIAL EVALUATION ADULT - ADDITIONAL COMMENTS
Patient reports living alone in an elevator access apartment building with ramp entrance and no ANDREW. Patient states she was independent with all ADL's, IADL's and functional mobility with rollator however has been increasing difficulty. Patient is R hand dominant and does not wear glasses. Patient has a walk in shower with shower chair. Patient also owns a RW.

## 2023-07-10 NOTE — SPEECH LANGUAGE PATHOLOGY EVALUATION - SLP PERTINENT HISTORY OF CURRENT PROBLEM
73y Female presents to the  ED for unsteadiness and dizziness x1 week. NIH of 5 for L facial and dysarthria and LUE weakness. 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 Daughter at bedside states that she is at her baseline. CTH was completed in the ED concerning for age indeterminate L cerebellar lacunar infarcts.

## 2023-07-10 NOTE — PROVIDER CONTACT NOTE (OTHER) - SITUATION
Covering RN found pt to have hives throughout b/l upper and lower extremties, pt c/o itching and discomfort.

## 2023-07-10 NOTE — SPEECH LANGUAGE PATHOLOGY EVALUATION - COMMENTS
Clock drawing task: Pt unable to write numbers due to writing/motor deficits, however marked with pen and verbalized aloud the number. Pt placed 11/12 numbers on the board (missed "2") with correct placement and spacing. Incorrect depiction of hands/time markers Difficulty with writing 2/2 UE weakness (patient is left handed). Slight open mouth posture at rest. Top dentures, edentulous on bottom. Tongue mildly protruded sitting on lower gums.

## 2023-07-10 NOTE — PROGRESS NOTE ADULT - SUBJECTIVE AND OBJECTIVE BOX
INTERVAL HPI/OVERNIGHT EVENTS:  Patient well known to me;  Called my office because of frequent falling;  The other neuro symptoms are baseline;  She has severe casandra left shoulder but all her RA medication have been held  SHE IS AWAKE AND ALERT      MEDICATIONS  (STANDING):  amLODIPine   Tablet 10 milliGRAM(s) Oral every 24 hours  atorvastatin 80 milliGRAM(s) Oral at bedtime  dextrose 5%. 1000 milliLiter(s) (50 mL/Hr) IV Continuous <Continuous>  dextrose 5%. 1000 milliLiter(s) (100 mL/Hr) IV Continuous <Continuous>  dextrose 50% Injectable 25 Gram(s) IV Push once  dextrose 50% Injectable 12.5 Gram(s) IV Push once  dextrose 50% Injectable 25 Gram(s) IV Push once  glucagon  Injectable 1 milliGRAM(s) IntraMuscular once  ibuprofen  Tablet. 600 milliGRAM(s) Oral once  insulin lispro (ADMELOG) corrective regimen sliding scale   SubCutaneous three times a day before meals  insulin lispro (ADMELOG) corrective regimen sliding scale   SubCutaneous at bedtime  lidocaine   4% Patch 1 Patch Transdermal every 24 hours  losartan 100 milliGRAM(s) Oral every 24 hours  metoprolol succinate  milliGRAM(s) Oral every 24 hours    MEDICATIONS  (PRN):  acetaminophen     Tablet .. 650 milliGRAM(s) Oral every 6 hours PRN Temp greater or equal to 38.5C (101.3F), Mild Pain (1 - 3), Moderate Pain (4 - 6)  dextrose Oral Gel 15 Gram(s) Oral once PRN Blood Glucose LESS THAN 70 milliGRAM(s)/deciliter      Allergies    No Known Allergies    Intolerances        Vital Signs Last 24 Hrs  T(C): 36.6 (10 Jul 2023 09:17), Max: 37.2 (09 Jul 2023 10:55)  T(F): 97.8 (10 Jul 2023 09:17), Max: 99 (09 Jul 2023 10:55)  HR: 90 (10 Jul 2023 08:40) (70 - 90)  BP: 122/77 (10 Jul 2023 08:40) (122/77 - 197/91)  BP(mean): 92 (10 Jul 2023 08:40) (92 - 130)  RR: 24 (10 Jul 2023 08:40) (19 - 32)  SpO2: 94% (10 Jul 2023 08:40) (94% - 99%)    Parameters below as of 10 Jul 2023 08:40  Patient On (Oxygen Delivery Method): room air              Constitutional: Awake     Eyes: NEDRA    ENMT: Negative    Neck: Supple    Back:  no tenderness     Respiratory:  clear     Cardiovascular: S1 S2    Gastrointestinal:  soft     Genitourinary:    Extremities:  Severe pain with ROM left arm    Vascular:    Neurological:    Skin:    Lymph Nodes:            07-09 @ 07:01  -  07-10 @ 07:00  --------------------------------------------------------  IN: 420 mL / OUT: 1400 mL / NET: -980 mL    07-10 @ 07:01  -  07-10 @ 09:53  --------------------------------------------------------  IN: 240 mL / OUT: 0 mL / NET: 240 mL      LABS:                        13.5   8.09  )-----------( 178      ( 10 Jul 2023 05:30 )             42.4     07-10    136  |  103  |  25<H>  ----------------------------<  164<H>  3.9   |  22  |  1.48<H>    Ca    9.6      10 Jul 2023 05:30  Phos  2.6     07-10  Mg     1.3     07-10        Urinalysis Basic - ( 10 Jul 2023 05:30 )    Color: x / Appearance: x / SG: x / pH: x  Gluc: 164 mg/dL / Ketone: x  / Bili: x / Urobili: x   Blood: x / Protein: x / Nitrite: x   Leuk Esterase: x / RBC: x / WBC x   Sq Epi: x / Non Sq Epi: x / Bacteria: x        RADIOLOGY & ADDITIONAL TESTS:

## 2023-07-10 NOTE — PROGRESS NOTE ADULT - TIME BILLING
Patient seen and examined;  Would restart home medication particularly the RA meds with the severe pain the left shoulder with ROM  Will discuss  with Dr Canada   Physical therapy  Will follow

## 2023-07-10 NOTE — OCCUPATIONAL THERAPY INITIAL EVALUATION ADULT - DIAGNOSIS, OT EVAL
Patient brought to [facility] 2/2 unsteadiness and dizziness for 1 week, presents with decreased LUE/LLE strength/ROM, deficits with overall strength, balance, postural control, activity tolerance impacting independence with functional activities and mobility.

## 2023-07-10 NOTE — OCCUPATIONAL THERAPY INITIAL EVALUATION ADULT - SENSORY TESTS
(R) hand grasp 4/5, (L) hand grasp 3-/5, Visual fields are full to confrontation, H and Quad test intact, Eye movements are intact without nystagmus, Pupils equally round and reactive to light, facial sensation V1-V3 equal and intact, normal eye closure with slight facial droop, tongue protrudes midlines, R shoulder shrug intact, L shoulder shrug NT 2/2 shoulder pain, b/l puffing cheeks intact, b/l eyebrow shrugs intact, hearing bilaterally with rubs intact

## 2023-07-10 NOTE — SPEECH LANGUAGE PATHOLOGY EVALUATION - SLP DIAGNOSIS
Slight articulation errors, most noted with /th/ and /s/, likely due to lingual positioning. Deficits do not impede intelligibility are like likely chronic, given pt and dtr report of speech being at baseline, as well as pt report of speech therapy as a child for /th/ sound. Expressive and receptive language skills are intact.

## 2023-07-10 NOTE — SPEECH LANGUAGE PATHOLOGY EVALUATION - SPECIFY REASON(S)
To assess speech and language due to report of slurred speech, however per pt and dtr, it is baseline

## 2023-07-10 NOTE — PROGRESS NOTE ADULT - ASSESSMENT
73y Female with PMHx of HTN HLD, DM, R hip replacement, RA, CKD (Cr baseline ~2) presents to the  ED for unsteadiness and dizziness x1 week. LKN was a week ago. mRS of 2, walks with a rolling walker but is able to care for her basic everyday needs. NIH of 5 for L facial and dysarthria and LUE weakness. 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 Daughter at bedside states that she is at her baseline. LUE is pain limited to her rheumatoid arthritis but patient states that she definitely has noticed it to be slightly weaker than usual. Otherwise no numbness or tingling, no headache, N/V. CTH was completed in the ED concerning for age indeterminate L cerebellar lacunar infarcts. CTA deferred due to CKD, will obtain MRA with MR brain without contrast instead. Admit to stroke neurology for work up under Dr. Canada to stroke tele.     Neuro  #CVA workup  - ASA, Plavix, and Atorvastatin discontinued to prevent ICH  - q4hr stroke neuro checks and vitals  - MRA H/N without LVO or HGS   - MRI w/o contrast: An 8 mm cortically based nodule in the posterior-superior frontal lobe (motor strip) is present with extensive vasogenic edema. Metastasis or focus of atypical infection are leading differential considerations  - neurosurgery consulted for possible mass   - pending MRI w/ contrast w/ [place]  - pending CT chest/abdomen/pelvis to r/o malignancy  - Stroke Code HCT Results: age indeterminate L cerebellar lacunar infarcts  - Stroke Code CTA Results: deferred due to CKD  - Stroke education    Cards  #HTN  - permissive hypertension, Goal SBP <180  - restarted BP meds, amlodipine 10mg, losartan 100mg, Toprol 100mg   - TTE: moderate LVH, grossly unremarkable  - Stroke Code EKG Results: NSR    #HLD  - holding statin   - LDL results: 69    Pulm  - call provider if SPO2 < 94%    GI  #Nutrition/Fluids/Electrolytes   - replete K<4 and Mg <2  - Diet: carb controlled    Renal  #CKD  - baseline Cr 2  - voiding    Infectious Disease  - afebrile    Rheum   #RA   - restarted home Sulfasalazine and Plaquenil  - Ibuprofen and Tylenol PRN for pain    Endocrine  #DM  - A1C results: 7.4   - ISS    - TSH results: 0.824    DVT Prophylaxis  - Lovenox sq for DVT prophylaxis   - SCDs for DVT prophylaxis      Discussed daily hospital plans and goals with patient     Discussed with Neurology Attending, Dr. Selina Canada

## 2023-07-10 NOTE — CONSULT NOTE ADULT - ASSESSMENT
Neurology    73 y o Female with PMHx of HTN HLD, DM, R hip replacement, RA, CKD (Cr baseline ~2) presents to the  ED for unsteadiness and dizziness x1 week. LKN was a week ago. mRS of 2, walks with a rolling walker but is able to care for her basic everyday needs. NIH of 5 for L facial and dysarthria and LUE weakness. 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 Daughter at bedside states that she is at her baseline. LUE is pain limited to her rheumatoid arthritis but patient states that she definitely has noticed it to be slightly weaker than usual. Otherwise no numbness or tingling, no headache, N/V. CTH was completed in the ED concerning for age indeterminate L cerebellar lacunar infarcts. CTA deferred due to CKD, will obtain MRA with MR brain without contrast instead. Admit to stroke neurology for work up under Dr. Canada to stroke tele.     Neuro  #CVA workup  - continue aspirin 81mg and plavix 75mg daily  - continue atorvastatin 80mg daily  - q4hr stroke neuro checks and vitals  - obtain MRI Brain without contrast with vessels (since could not get CTA because of CKD)  - Stroke Code HCT Results: age indeterminate L cerebellar lacunar infarcts  - Stroke Code CTA Results: deferred due to CKD  - Stroke education    Cards  #HTN  - permissive hypertension, Goal SBP <180  - restarted BP meds, amlodipine 10mg, losartan 100mg, toprol 100mg   - obtain TTE  - Stroke Code EKG Results: pending  - follow up Trop- had trop leak to 40 in the ED    #HLD  - high dose statin as above in CVA  - LDL results: pending    Pulm  - call provider if SPO2 < 94%    GI  #Nutrition/Fluids/Electrolytes   - replete K<4 and Mg <2  - Diet: carb controlled    Renal  - CKD- baseline Cr 2  - voiding    Infectious Disease  - afebrile  - Stroke CXR results: pending    Endocrine  #DM  - A1C results: pendng  - ISS  - TSH results: pending    DVT Prophylaxis  - lovenox sq for DVT prophylaxis   - SCDs for DVT prophylaxis      Discussed daily hospital plans and goals with patient and family at bedside. (Called and updated family.)    Discussed with Neurology Attending

## 2023-07-11 DIAGNOSIS — D49.6 NEOPLASM OF UNSPECIFIED BEHAVIOR OF BRAIN: ICD-10-CM

## 2023-07-11 DIAGNOSIS — G93.9 DISORDER OF BRAIN, UNSPECIFIED: ICD-10-CM

## 2023-07-11 LAB
GLUCOSE BLDC GLUCOMTR-MCNC: 179 MG/DL — HIGH (ref 70–99)
GLUCOSE BLDC GLUCOMTR-MCNC: 188 MG/DL — HIGH (ref 70–99)
GLUCOSE BLDC GLUCOMTR-MCNC: 247 MG/DL — HIGH (ref 70–99)
GLUCOSE BLDC GLUCOMTR-MCNC: 266 MG/DL — HIGH (ref 70–99)

## 2023-07-11 PROCEDURE — 99232 SBSQ HOSP IP/OBS MODERATE 35: CPT | Mod: GC

## 2023-07-11 PROCEDURE — 74177 CT ABD & PELVIS W/CONTRAST: CPT | Mod: 26

## 2023-07-11 PROCEDURE — 71260 CT THORAX DX C+: CPT | Mod: 26

## 2023-07-11 PROCEDURE — 70553 MRI BRAIN STEM W/O & W/DYE: CPT | Mod: 26

## 2023-07-11 PROCEDURE — 99222 1ST HOSP IP/OBS MODERATE 55: CPT

## 2023-07-11 RX ADMIN — Medication 650 MILLIGRAM(S): at 13:26

## 2023-07-11 RX ADMIN — LIDOCAINE 1 PATCH: 4 CREAM TOPICAL at 08:06

## 2023-07-11 RX ADMIN — LIDOCAINE 1 PATCH: 4 CREAM TOPICAL at 20:08

## 2023-07-11 RX ADMIN — Medication 200 MILLIGRAM(S): at 18:38

## 2023-07-11 RX ADMIN — Medication 2: at 10:11

## 2023-07-11 RX ADMIN — AMLODIPINE BESYLATE 10 MILLIGRAM(S): 2.5 TABLET ORAL at 06:49

## 2023-07-11 RX ADMIN — Medication 650 MILLIGRAM(S): at 23:50

## 2023-07-11 RX ADMIN — IOHEXOL 30 MILLILITER(S): 300 INJECTION, SOLUTION INTRAVENOUS at 11:40

## 2023-07-11 RX ADMIN — LOSARTAN POTASSIUM 100 MILLIGRAM(S): 100 TABLET, FILM COATED ORAL at 13:22

## 2023-07-11 RX ADMIN — Medication 100 MILLIGRAM(S): at 09:14

## 2023-07-11 RX ADMIN — Medication 2: at 18:33

## 2023-07-11 RX ADMIN — Medication 650 MILLIGRAM(S): at 13:54

## 2023-07-11 RX ADMIN — Medication 1000 MILLIGRAM(S): at 06:48

## 2023-07-11 RX ADMIN — LIDOCAINE 1 PATCH: 4 CREAM TOPICAL at 18:44

## 2023-07-11 RX ADMIN — Medication 1000 MILLIGRAM(S): at 18:38

## 2023-07-11 RX ADMIN — Medication 200 MILLIGRAM(S): at 06:49

## 2023-07-11 RX ADMIN — Medication 2: at 23:46

## 2023-07-11 RX ADMIN — Medication 4: at 13:22

## 2023-07-11 NOTE — CHART NOTE - NSCHARTNOTEFT_GEN_A_CORE
MR brain without contrast showed an 8mm cortically based nodule in the posterior-superior frontal lobe with extensive vasogenic edema c/f mets versus atypical infection.     No acute infarcts, R frontal mass to be managed by NSGY. Stroke team to sign off.     Discussed with Neurology Attending Dr. Canada

## 2023-07-11 NOTE — PROGRESS NOTE ADULT - PROBLEM SELECTOR PLAN 3
- holding statin   - LDL results: 69 - c BP meds, amlodipine 10mg, losartan 100mg, Toprol 100mg , uptitrate if needed  - TTE: moderate LVH, grossly unremarkable  - Stroke Code EKG Results: NSR

## 2023-07-11 NOTE — PROGRESS NOTE ADULT - PROBLEM SELECTOR PLAN 1
Workup for falls in the setting of falls.   - ASA, Plavix, and Atorvastatin discontinued to prevent ICH  - q4hr stroke neuro checks and vitals  - MRA H/N without large vessel occlusion or HGS   - MRI w/o contrast: An 8 mm cortically based nodule in the posterior-superior frontal lobe (motor strip) is present with extensive vasogenic edema. Metastasis or focus of atypical infection are leading differential considerations  - neurosurgery consulted for possible mass   - pending MRI w/ contrast w/ [place]  - pending CT chest/abdomen/pelvis to r/o malignancy    - Stroke Code HCT Results: age indeterminate L cerebellar lacunar infarcts  - Stroke Code CTA Results: deferred due to CKD  - Stroke education Workup for falls in the setting of falls/CVA.   - ASA, Plavix, and Atorvastatin discontinued to prevent ICH  - q6hr neuro checks and vitals  - MRA H/N without large vessel occlusion or HGS   - MRI w/o contrast: An 8 mm cortically based nodule in the posterior-superior frontal lobe (motor strip) is present with extensive vasogenic edema. Metastasis or focus of atypical infection are leading differential considerations  - neurosurgery consulted for possible mass   - pending MRI w/ contrast w/ [place]  - pending CT chest/abdomen/pelvis to r/o malignancy  - if altered/concerning, consider seizure given vasogenic edema    - Stroke Code HCT Results: age indeterminate L cerebellar lacunar infarcts  - Stroke Code CTA Results: deferred due to CKD  - Stroke education Workup for falls with potential brain mass etiology.   - ASA, Plavix, and Atorvastatin discontinued to prevent ICH  - q6hr neuro checks and vitals  - MRA H/N without large vessel occlusion or HGS   - MRI w/o contrast: An 8 mm cortically based nodule in the posterior-superior frontal lobe (motor strip) is present with extensive vasogenic edema. Metastasis or focus of atypical infection are leading differential considerations  - neurosurgery consulted for possible mass   - pending MRI w/ contrast w/ [place]  - pending CT chest/abdomen/pelvis to r/o malignancy  - if altered/concerning, consider seizure given vasogenic edema  - eventual outpatient neurology follow up    - Stroke Code HCT Results: age indeterminate L cerebellar lacunar infarcts  - Stroke Code CTA Results: deferred due to CKD  - Stroke education

## 2023-07-11 NOTE — PROGRESS NOTE ADULT - SUBJECTIVE AND OBJECTIVE BOX
INTERVAL HPI/OVERNIGHT EVENTS:  Interim reviewed;  Brain lesion noted;  Further workup pending  Having less pain left shoulder;  Difficulty moving arm  Patient back on home medication       MEDICATIONS  (STANDING):  amLODIPine   Tablet 10 milliGRAM(s) Oral every 24 hours  dextrose 5%. 1000 milliLiter(s) (50 mL/Hr) IV Continuous <Continuous>  dextrose 5%. 1000 milliLiter(s) (100 mL/Hr) IV Continuous <Continuous>  dextrose 50% Injectable 25 Gram(s) IV Push once  dextrose 50% Injectable 12.5 Gram(s) IV Push once  dextrose 50% Injectable 25 Gram(s) IV Push once  glucagon  Injectable 1 milliGRAM(s) IntraMuscular once  hydroxychloroquine 200 milliGRAM(s) Oral every 12 hours  ibuprofen  Tablet. 600 milliGRAM(s) Oral once  insulin lispro (ADMELOG) corrective regimen sliding scale   SubCutaneous three times a day before meals  insulin lispro (ADMELOG) corrective regimen sliding scale   SubCutaneous at bedtime  iohexol 300 mG (iodine)/mL Oral Solution 30 milliLiter(s) Oral once  lidocaine   4% Patch 1 Patch Transdermal every 24 hours  losartan 100 milliGRAM(s) Oral every 24 hours  metoprolol succinate  milliGRAM(s) Oral every 24 hours  sulfaSALAzine 1000 milliGRAM(s) Oral every 12 hours    MEDICATIONS  (PRN):  acetaminophen     Tablet .. 650 milliGRAM(s) Oral every 6 hours PRN Temp greater or equal to 38.5C (101.3F), Mild Pain (1 - 3), Moderate Pain (4 - 6)  dextrose Oral Gel 15 Gram(s) Oral once PRN Blood Glucose LESS THAN 70 milliGRAM(s)/deciliter      Allergies    No Known Allergies    Intolerances        Vital Signs Last 24 Hrs  T(C): 36.8 (11 Jul 2023 09:13), Max: 36.9 (11 Jul 2023 05:57)  T(F): 98.2 (11 Jul 2023 09:13), Max: 98.5 (11 Jul 2023 05:57)  HR: 75 (11 Jul 2023 09:13) (61 - 79)  BP: 114/78 (11 Jul 2023 09:13) (114/63 - 137/69)  BP(mean): 97 (10 Jul 2023 20:34) (83 - 97)  RR: 18 (11 Jul 2023 05:57) (17 - 34)  SpO2: 95% (11 Jul 2023 05:57) (95% - 97%)    Parameters below as of 11 Jul 2023 05:57  Patient On (Oxygen Delivery Method): room air              Constitutional: Awake     Eyes: NEDRA    ENMT: Negative    Neck: Supple    Back:  no tenderness     Respiratory:  clear    Cardiovascular: S1 S2    Gastrointestinal:  soft     Genitourinary:    Extremities:  no edema     Vascular:    Neurological: No change    Skin:    Lymph Nodes:            07-10 @ 07:01  -  07-11 @ 07:00  --------------------------------------------------------  IN: 576 mL / OUT: 730 mL / NET: -154 mL      LABS:                        13.5   8.09  )-----------( 178      ( 10 Jul 2023 05:30 )             42.4     07-10    136  |  103  |  25<H>  ----------------------------<  164<H>  3.9   |  22  |  1.48<H>    Ca    9.6      10 Jul 2023 05:30  Phos  2.6     07-10  Mg     1.3     07-10        Urinalysis Basic - ( 10 Jul 2023 05:30 )    Color: x / Appearance: x / SG: x / pH: x  Gluc: 164 mg/dL / Ketone: x  / Bili: x / Urobili: x   Blood: x / Protein: x / Nitrite: x   Leuk Esterase: x / RBC: x / WBC x   Sq Epi: x / Non Sq Epi: x / Bacteria: x        RADIOLOGY & ADDITIONAL TESTS:

## 2023-07-11 NOTE — PROGRESS NOTE ADULT - PROBLEM SELECTOR PLAN 1
Workup for falls with potential brain mass etiology.   - ASA, Plavix, and Atorvastatin discontinued to prevent ICH  - q6hr neuro checks and vitals  - MRA H/N without large vessel occlusion or HGS   - MRI w/o contrast: An 8 mm cortically based nodule in the posterior-superior frontal lobe (motor strip) is present with extensive vasogenic edema. Metastasis or focus of atypical infection are leading differential considerations  - neurosurgery consulted for possible mass   - pending MRI w/ contrast w/ [place]  - pending CT chest/abdomen/pelvis to r/o malignancy  - if altered/concerning, consider seizure given vasogenic edema  - eventual outpatient neurology follow up    - Stroke Code HCT Results: age indeterminate L cerebellar lacunar infarcts  - Stroke Code CTA Results: deferred due to CKD  - Stroke education.

## 2023-07-11 NOTE — PROGRESS NOTE ADULT - PROBLEM SELECTOR PLAN 2
HTN (hypertension).   - c BP meds, amlodipine 10mg, losartan 100mg, Toprol 100mg , uptitrate if needed  - TTE: moderate LVH, grossly unremarkable  - Stroke Code EKG Results: NSR.

## 2023-07-11 NOTE — CONSULT NOTE ADULT - SUBJECTIVE AND OBJECTIVE BOX
Oncology Consult Note  HPI:   **STROKE HPI***    HPI: 73y Female with PMHx of HTN HLD, DM, R hip replacement, RA, CKD (Cr baseline ~2) presents to the  ED for unsteadiness and dizziness x1 week. LKN was a week ago. mRS of 2, walks with a rolling walker but is able to care for her basic everyday needs. NIH of 5 for L facial and dysarthria and LUE weakness. 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 Daughter at bedside states that she is at her baseline. LUE is pain limited to her rheumatoid arthritis but patient states that she definitely has noticed it to be slightly weaker than usual. Otherwise no numbness or tingling, no headache, N/V. CTH was completed in the ED concerning for age indeterminate L cerebellar lacunar infarcts.       (09 Jul 2023 03:04)      Allergies    No Known Allergies    Intolerances        MEDICATIONS  (STANDING):  amLODIPine   Tablet 10 milliGRAM(s) Oral every 24 hours  dextrose 5%. 1000 milliLiter(s) (50 mL/Hr) IV Continuous <Continuous>  dextrose 5%. 1000 milliLiter(s) (100 mL/Hr) IV Continuous <Continuous>  dextrose 50% Injectable 25 Gram(s) IV Push once  dextrose 50% Injectable 12.5 Gram(s) IV Push once  dextrose 50% Injectable 25 Gram(s) IV Push once  glucagon  Injectable 1 milliGRAM(s) IntraMuscular once  hydroxychloroquine 200 milliGRAM(s) Oral every 12 hours  ibuprofen  Tablet. 600 milliGRAM(s) Oral once  insulin lispro (ADMELOG) corrective regimen sliding scale   SubCutaneous three times a day before meals  insulin lispro (ADMELOG) corrective regimen sliding scale   SubCutaneous at bedtime  iohexol 300 mG (iodine)/mL Oral Solution 30 milliLiter(s) Oral once  lidocaine   4% Patch 1 Patch Transdermal every 24 hours  losartan 100 milliGRAM(s) Oral every 24 hours  metoprolol succinate  milliGRAM(s) Oral every 24 hours  sulfaSALAzine 1000 milliGRAM(s) Oral every 12 hours    MEDICATIONS  (PRN):  acetaminophen     Tablet .. 650 milliGRAM(s) Oral every 6 hours PRN Temp greater or equal to 38.5C (101.3F), Mild Pain (1 - 3), Moderate Pain (4 - 6)  dextrose Oral Gel 15 Gram(s) Oral once PRN Blood Glucose LESS THAN 70 milliGRAM(s)/deciliter      PAST MEDICAL & SURGICAL HISTORY:  HTN (hypertension)  HLD (hyperlipidemia)  DM (diabetes mellitus), type 2  Rheumatoid arthritis  Stage 4 chronic kidney disease  Degenerative joint disease (DJD) of lumbar spine  Osteopenia  S/P total right hip arthroplasty          FAMILY HISTORY:  No pertinent family history in first degree relatives        SOCIAL HISTORY: No EtOH, no tobacco    REVIEW OF SYSTEMS:    CONSTITUTIONAL: No weakness, fevers or chills  EYES/ENT: No visual changes;  No vertigo or throat pain   NECK: No pain or stiffness  RESPIRATORY: No cough, wheezing, hemoptysis; No shortness of breath  CARDIOVASCULAR: No chest pain or palpitations  GASTROINTESTINAL: No abdominal or epigastric pain. No nausea, vomiting, or hematemesis; No diarrhea or constipation. No melena or hematochezia.  GENITOURINARY: No dysuria, frequency or hematuria  NEUROLOGICAL: No numbness or weakness  SKIN: No itching, burning, rashes, or lesions   All other review of systems is negative unless indicated above.        T(F): 98.2 (07-11-23 @ 09:13), Max: 98.5 (07-11-23 @ 05:57)  HR: 75 (07-11-23 @ 09:13)  BP: 114/78 (07-11-23 @ 09:13)  RR: 18 (07-11-23 @ 05:57)  SpO2: 95% (07-11-23 @ 05:57)  Wt(kg): --    GENERAL: NAD, well-developed  HEAD:  Atraumatic, Normocephalic  EYES: EOMI, PERRLA, conjunctiva and sclera clear  NECK: Supple, No JVD  CHEST/LUNG: Clear to auscultation bilaterally; No wheeze  HEART: Regular rate and rhythm; No murmurs, rubs, or gallops  ABDOMEN: Soft, Nontender, Nondistended; Bowel sounds present  EXTREMITIES:  2+ Peripheral Pulses, No clubbing, cyanosis, or edema  NEUROLOGY: non-focal  SKIN: No rashes or lesions                          13.5   8.09  )-----------( 178      ( 10 Jul 2023 05:30 )             42.4       07-10    136  |  103  |  25<H>  ----------------------------<  164<H>  3.9   |  22  |  1.48<H>    Ca    9.6      10 Jul 2023 05:30  Phos  2.6     07-10  Mg     1.3     07-10    CT Head 7.9.23  IMPRESSION:  No acute intracranial hemorrhage, acute demarcated territorial   infarction, or masslike lesion. Age indeterminant left cerebellar and   left internal capsule lacunar infarcts.    MR Head wo contrast 7.9.23  IMPRESSION:    An 8 mm cortically based nodule in the posterior-superior frontal lobe   (motor strip) is present with extensive vasogenic edema. Metastasis or   focus of atypical infection are leading differential considerations. The   patient should return for contrast-enhanced MR imaging (unless   contraindicated) to assess for multiplicity.    No recent infarct. Chronic infarcts and white matter microangiopathic   change as above.         Oncology Consult Note  HPI:   **STROKE HPI***    HPI: 73y Female with PMHx of HTN HLD, DM, R hip replacement, RA, CKD (Cr baseline ~2) presents to the  ED for unsteadiness and dizziness x1 week. LKN was a week ago. mRS of 2, walks with a rolling walker but is able to care for her basic everyday needs. NIH of 5 for L facial and dysarthria and LUE weakness. 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 Daughter at bedside states that she is at her baseline. LUE is pain limited to her rheumatoid arthritis but patient states that she definitely has noticed it to be slightly weaker than usual. Otherwise no numbness or tingling, no headache, N/V. CTH was completed in the ED concerning for age indeterminate L cerebellar lacunar infarcts.       (09 Jul 2023 03:04)      Allergies    No Known Allergies    Intolerances        MEDICATIONS  (STANDING):  amLODIPine   Tablet 10 milliGRAM(s) Oral every 24 hours  dextrose 5%. 1000 milliLiter(s) (50 mL/Hr) IV Continuous <Continuous>  dextrose 5%. 1000 milliLiter(s) (100 mL/Hr) IV Continuous <Continuous>  dextrose 50% Injectable 25 Gram(s) IV Push once  dextrose 50% Injectable 12.5 Gram(s) IV Push once  dextrose 50% Injectable 25 Gram(s) IV Push once  glucagon  Injectable 1 milliGRAM(s) IntraMuscular once  hydroxychloroquine 200 milliGRAM(s) Oral every 12 hours  ibuprofen  Tablet. 600 milliGRAM(s) Oral once  insulin lispro (ADMELOG) corrective regimen sliding scale   SubCutaneous three times a day before meals  insulin lispro (ADMELOG) corrective regimen sliding scale   SubCutaneous at bedtime  iohexol 300 mG (iodine)/mL Oral Solution 30 milliLiter(s) Oral once  lidocaine   4% Patch 1 Patch Transdermal every 24 hours  losartan 100 milliGRAM(s) Oral every 24 hours  metoprolol succinate  milliGRAM(s) Oral every 24 hours  sulfaSALAzine 1000 milliGRAM(s) Oral every 12 hours    MEDICATIONS  (PRN):  acetaminophen     Tablet .. 650 milliGRAM(s) Oral every 6 hours PRN Temp greater or equal to 38.5C (101.3F), Mild Pain (1 - 3), Moderate Pain (4 - 6)  dextrose Oral Gel 15 Gram(s) Oral once PRN Blood Glucose LESS THAN 70 milliGRAM(s)/deciliter      PAST MEDICAL & SURGICAL HISTORY:  HTN (hypertension)  HLD (hyperlipidemia)  DM (diabetes mellitus), type 2  Rheumatoid arthritis  Stage 4 chronic kidney disease  Degenerative joint disease (DJD) of lumbar spine  Osteopenia  S/P total right hip arthroplasty          FAMILY HISTORY:  Father: laryngeal cancer  Paternal grandmother: esophageal cancer        SOCIAL HISTORY: prior ppd smoker at age 13, quit in 1993.    REVIEW OF SYSTEMS:    CONSTITUTIONAL: No weakness, fevers or chills  EYES/ENT: No visual changes;  No vertigo or throat pain   NECK: No pain or stiffness  RESPIRATORY: No cough, wheezing, hemoptysis; No shortness of breath  CARDIOVASCULAR: No chest pain or palpitations  GASTROINTESTINAL: No abdominal or epigastric pain. No nausea, vomiting, or hematemesis; No diarrhea or constipation. No melena or hematochezia.  GENITOURINARY: No dysuria, frequency or hematuria  NEUROLOGICAL: No numbness or weakness  SKIN: No itching, burning, rashes, or lesions   All other review of systems is negative unless indicated above.        T(F): 98.2 (07-11-23 @ 09:13), Max: 98.5 (07-11-23 @ 05:57)  HR: 75 (07-11-23 @ 09:13)  BP: 114/78 (07-11-23 @ 09:13)  RR: 18 (07-11-23 @ 05:57)  SpO2: 95% (07-11-23 @ 05:57)  Wt(kg): --    GENERAL: NAD, well-developed  HEAD:  Atraumatic, Normocephalic  EYES: EOMI, PERRLA, conjunctiva and sclera clear  NECK: Supple, No JVD  CHEST/LUNG: Clear to auscultation bilaterally; No wheeze  HEART: Regular rate and rhythm; No murmurs, rubs, or gallops  ABDOMEN: Soft, Nontender, Nondistended; Bowel sounds present  EXTREMITIES:  2+ Peripheral Pulses, No clubbing, cyanosis, or edema  NEUROLOGY: LUE weakness, residual L sided flattening (per pt chronic w bell's palsy, LE strength R >L  SKIN: No rashes or lesions                          13.5   8.09  )-----------( 178      ( 10 Jul 2023 05:30 )             42.4       07-10    136  |  103  |  25<H>  ----------------------------<  164<H>  3.9   |  22  |  1.48<H>    Ca    9.6      10 Jul 2023 05:30  Phos  2.6     07-10  Mg     1.3     07-10    CT Head 7.9.23  IMPRESSION:  No acute intracranial hemorrhage, acute demarcated territorial   infarction, or masslike lesion. Age indeterminant left cerebellar and   left internal capsule lacunar infarcts.    MR Head wo contrast 7.9.23  IMPRESSION:    An 8 mm cortically based nodule in the posterior-superior frontal lobe   (motor strip) is present with extensive vasogenic edema. Metastasis or   focus of atypical infection are leading differential considerations. The   patient should return for contrast-enhanced MR imaging (unless   contraindicated) to assess for multiplicity.    No recent infarct. Chronic infarcts and white matter microangiopathic   change as above.

## 2023-07-11 NOTE — PROGRESS NOTE ADULT - SUBJECTIVE AND OBJECTIVE BOX
INTERVAL HPI/OVERNIGHT EVENTS: CYNDY    SUBJECTIVE: Pt seen and examined at bedside. CYNDY.   Denies f/c/n/v/d, abd pain, SOB, CP,  sxs,     ANTIBIOTICS/RELEVANT:    MEDICATIONS  (STANDING):  amLODIPine   Tablet 10 milliGRAM(s) Oral every 24 hours  dextrose 5%. 1000 milliLiter(s) (50 mL/Hr) IV Continuous <Continuous>  dextrose 5%. 1000 milliLiter(s) (100 mL/Hr) IV Continuous <Continuous>  dextrose 50% Injectable 25 Gram(s) IV Push once  dextrose 50% Injectable 12.5 Gram(s) IV Push once  dextrose 50% Injectable 25 Gram(s) IV Push once  glucagon  Injectable 1 milliGRAM(s) IntraMuscular once  hydroxychloroquine 200 milliGRAM(s) Oral every 12 hours  ibuprofen  Tablet. 600 milliGRAM(s) Oral once  insulin lispro (ADMELOG) corrective regimen sliding scale   SubCutaneous three times a day before meals  insulin lispro (ADMELOG) corrective regimen sliding scale   SubCutaneous at bedtime  lidocaine   4% Patch 1 Patch Transdermal every 24 hours  losartan 100 milliGRAM(s) Oral every 24 hours  metoprolol succinate  milliGRAM(s) Oral every 24 hours  sulfaSALAzine 1000 milliGRAM(s) Oral every 12 hours    MEDICATIONS  (PRN):  acetaminophen     Tablet .. 650 milliGRAM(s) Oral every 6 hours PRN Temp greater or equal to 38.5C (101.3F), Mild Pain (1 - 3), Moderate Pain (4 - 6)  dextrose Oral Gel 15 Gram(s) Oral once PRN Blood Glucose LESS THAN 70 milliGRAM(s)/deciliter      Vital Signs Last 24 Hrs  T(C): 36.7 (11 Jul 2023 12:35), Max: 36.9 (11 Jul 2023 05:57)  T(F): 98.1 (11 Jul 2023 12:35), Max: 98.5 (11 Jul 2023 05:57)  HR: 73 (11 Jul 2023 13:21) (60 - 75)  BP: 117/76 (11 Jul 2023 13:21) (102/65 - 131/80)  BP(mean): 97 (10 Jul 2023 20:34) (83 - 97)  RR: 18 (11 Jul 2023 12:35) (17 - 20)  SpO2: 94% (11 Jul 2023 12:35) (94% - 96%)    Parameters below as of 11 Jul 2023 12:35  Patient On (Oxygen Delivery Method): room air        PHYSICAL EXAM:  General: in no acute distress  Eyes: EOMI intact bilaterally. Anicteric sclerae, moist conjunctivae  HENT: Moist mucous membranes  Neck: Trachea midline, supple  Lungs: CTA B/L. No wheezes, rales, or rhonchi  Cardiovascular: RRR. No murmurs, rubs, or gallops  Abdomen: Soft, non-tender non-distended; No rebound or guarding  Extremities: WWP, No clubbing, cyanosis or edema  Neurological: Alert and oriented, CN II-X and XII intact. Pt Lt trapezius muscle str 4/5, Rt is 5/5. Peripheral sensation intact. Rt sided UE and LE 5/5. Lt sided UE 2/5. Rt sided finger to nose intact, Lt sided cannot be tested.   Skin: Warm and dry. No obvious rash     LABS:                        13.5   8.09  )-----------( 178      ( 10 Jul 2023 05:30 )             42.4     07-10    136  |  103  |  25<H>  ----------------------------<  164<H>  3.9   |  22  |  1.48<H>    Ca    9.6      10 Jul 2023 05:30  Phos  2.6     07-10  Mg     1.3     07-10        Urinalysis Basic - ( 10 Jul 2023 05:30 )    Color: x / Appearance: x / SG: x / pH: x  Gluc: 164 mg/dL / Ketone: x  / Bili: x / Urobili: x   Blood: x / Protein: x / Nitrite: x   Leuk Esterase: x / RBC: x / WBC x   Sq Epi: x / Non Sq Epi: x / Bacteria: x        MICROBIOLOGY:    RADIOLOGY & ADDITIONAL STUDIES:

## 2023-07-11 NOTE — PROGRESS NOTE ADULT - PROBLEM SELECTOR PLAN 5
- restarted home Sulfasalazine and Plaquenil  - Ibuprofen and Tylenol PRN for pain - c home Sulfasalazine and Plaquenil  - Ibuprofen and Tylenol PRN for pain - A1C results: 7.4   - ISS

## 2023-07-11 NOTE — PROGRESS NOTE ADULT - PROBLEM SELECTOR PLAN 6
- baseline Cr 2  - voiding - c home Sulfasalazine and Plaquenil  - Ibuprofen and Tylenol PRN for pain

## 2023-07-11 NOTE — PROGRESS NOTE ADULT - NSPROGADDITIONALINFOA_GEN_ALL_CORE
Prophylaxis  - Pulm: call provider if SPO2 < 94%  - Nutrition/Fluids/Electrolytes: replete K<4 and Mg <2  - Diet: carb controlled  - DVT: Lovenox sq for DVT prophylaxis, SCDs for DVT prophylaxis

## 2023-07-11 NOTE — PROGRESS NOTE ADULT - ASSESSMENT
Ms. Minor is a 73 year old female with a PMHx of HTN, HLD, DM, Right hip replacement, RA, CKD (Cr baseline ~2) who presented to the  ED for unsteadiness and dizziness x1 week with last known normal a week ago. 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 Her daughter at bedside confirmed that she is at her baseline. Patient presented with no numbness or tingling, no headache, no N/V. CT Head was completed in the ED concerning for age indeterminate L cerebellar lacunar infarcts, with an addendum added noting patient was found to have area of vasogenic edema in the right frontoparietal region w/ associated sulcal effacement which could indicate underlying mass. She was admitted to stroke tele for further workup, started on DAPT and high dose statin. MR brain without contrast showed an 8mm cortically based nodule in the posterior-superior frontal lobe with extensive vasogenic edema c/f mets versus atypical infection. MRI w/ contrast was ordered to further evaluate potential mass. MR Angio brain and neck were unremarkable and did not show arterial steno-occlusive disease. Ms. Minor was admitted to stroke neurology for work up following which DAPT and statin was discontinued to decrease risk of ICH, patient's home RA meds (Plaquenil, Sulfasalazine) were restarted and neurosurgery was consulted who recommended CT chest, abdomen and pelvis to r/o malignancy. Ms. Minor was subsequently transferred from the stroke service to medicine for continued  malignancy workup.

## 2023-07-11 NOTE — CONSULT NOTE ADULT - ASSESSMENT
73F with PMHx of HTN HLD, DM, R hip replacement, RA, CKD (Cr baseline ~2) presents to the  ED for unsteadiness and dizziness x1 week, underwent stroke workup with imaging findings negative for acute CVA however with incidental finding of brain mass suspicious for metastasis. Oncology consulted for malignancy workup.    #brain mass suspicious for malignancy  this patient was found to have an 8mm cortically based nodule in the posterior-superior frontal lobe suspicious for metastasis. The most common primary cancers that metastasize to brain include carcinomas (lung, breast, renal, colorectal) and melanoma, however tissue confirmation is necessary. A primary brain lesion or lymphoma are also on the differential.  -please obtain CT Chest, Abdomen, Pelvis w/wo contrast to help us identify a possible primary lesion or lesion amenable for biopsy    We will follow along.  Patient to be seen and discussed with Dr. Tate

## 2023-07-11 NOTE — PROGRESS NOTE ADULT - PROBLEM SELECTOR PLAN 2
- permissive hypertension, Goal SBP <180  - restarted BP meds, amlodipine 10mg, losartan 100mg, Toprol 100mg   - TTE: moderate LVH, grossly unremarkable  - Stroke Code EKG Results: NSR - c BP meds, amlodipine 10mg, losartan 100mg, Toprol 100mg , uptitrate if needed  - TTE: moderate LVH, grossly unremarkable  - Stroke Code EKG Results: NSR - MRA H/N without large vessel occlusion or HGS   - MRI w/o contrast: An 8 mm cortically based nodule in the posterior-superior frontal lobe (motor strip) is present with extensive vasogenic edema. Metastasis or focus of atypical infection are leading differential considerations  - neurosurgery consulted for possible mass   - pending MRI w/ contrast w/ [place]  - pending CT chest/abdomen/pelvis to r/o malignancy  - eventual outpt follow up with neurology

## 2023-07-11 NOTE — PROGRESS NOTE ADULT - SUBJECTIVE AND OBJECTIVE BOX
*** Transfer from Stroke Service to Medicine *** *** Transfer from Stroke Service to Medicine ***    Ms. Minor is a 73 year old female with a PMHx of HTN, HLD, DM, Right hip replacement, RA, CKD (Cr baseline ~2) who presented to the  ED for unsteadiness and dizziness x1 week. Last known normal was a week ago. mRS of 2, walks with a rolling walker but is able to care for her basic everyday needs. NIH of 5 for a Left facial and dysarthria and LUE weakness. 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 Her daughter at bedside states that she is at her baseline. LUE is pain limited due to her rheumatoid arthritis but patient states that she definitely has noticed it to be slightly weaker than usual. Otherwise no numbness or tingling, no headache, no N/V. CT Head was completed in the ED concerning for age indeterminate L cerebellar lacunar infarcts, with an addendum added and patient was found to have area of vasogenic edema in the right frontoparietal region with associated sulcal effacement which could indicate underlying mass. CTA was deferred due to CKD. She was admitted to stroke tele for further workup, started on DAPT and high dose statin. MR brain without contrast showed an 8mm cortically based nodule in the posterior-superior frontal lobe with extensive vasogenic edema c/f mets versus atypical infection. MRI w/ contrast was ordered to further evaluate potential mass. MR Angio Brain and neck were unremarkable and did not show arterial steno-occlusive disease. Ms. Minor was admitted to stroke neurology for work up under Dr. Canada. Following this, DAPT and statin discontinued to decrease risk of ICH, patient's home RA meds (Plaquenil, Sulfasalazine) were restarted and neurosurgery was consulted who recommended CT chest, abdomen and pelvis to r/o malignancy. Ms. Minor was transferred from the stroke service to medicine under Dr. Douglass for malignancy workup.      INTERVAL HPI/OVERNIGHT EVENTS:  Patient seen and examined. O/N patient required IVP Hydral x 2 for elevated SBPs. Also noted to have developed hives, states she's sensitive to new detergents, got Benadryl 25 mg PO. Symptoms resolved.     MEDICATIONS  (STANDING):  amLODIPine   Tablet 10 milliGRAM(s) Oral every 24 hours  dextrose 5%. 1000 milliLiter(s) (50 mL/Hr) IV Continuous <Continuous>  dextrose 5%. 1000 milliLiter(s) (100 mL/Hr) IV Continuous <Continuous>  dextrose 50% Injectable 25 Gram(s) IV Push once  dextrose 50% Injectable 12.5 Gram(s) IV Push once  dextrose 50% Injectable 25 Gram(s) IV Push once  glucagon  Injectable 1 milliGRAM(s) IntraMuscular once  hydroxychloroquine 200 milliGRAM(s) Oral every 12 hours  ibuprofen  Tablet. 600 milliGRAM(s) Oral once  insulin lispro (ADMELOG) corrective regimen sliding scale   SubCutaneous three times a day before meals  insulin lispro (ADMELOG) corrective regimen sliding scale   SubCutaneous at bedtime  iohexol 300 mG (iodine)/mL Oral Solution 30 milliLiter(s) Oral once  lidocaine   4% Patch 1 Patch Transdermal every 24 hours  losartan 100 milliGRAM(s) Oral every 24 hours  metoprolol succinate  milliGRAM(s) Oral every 24 hours  sulfaSALAzine 1000 milliGRAM(s) Oral every 12 hours    MEDICATIONS  (PRN):  acetaminophen     Tablet .. 650 milliGRAM(s) Oral every 6 hours PRN Temp greater or equal to 38.5C (101.3F), Mild Pain (1 - 3), Moderate Pain (4 - 6)  dextrose Oral Gel 15 Gram(s) Oral once PRN Blood Glucose LESS THAN 70 milliGRAM(s)/deciliter      ALLERGIES:  No Known Allergies    VITAL SIGNS LAST 24 HOURS:  T(C): 36.8 (10 Jul 2023 18:15), Max: 37.2 (10 Jul 2023 05:03)  T(F): 98.3 (10 Jul 2023 18:15), Max: 99 (10 Jul 2023 05:03)  HR: 61 (10 Jul 2023 17:54) (61 - 90)  BP: 114/63 (10 Jul 2023 17:54) (114/63 - 197/91)  BP(mean): 83 (10 Jul 2023 17:54) (83 - 130)  RR: 20 (10 Jul 2023 17:54) (19 - 34)  SpO2: 95% (10 Jul 2023 17:54) (94% - 99%)    Parameters below as of 10 Jul 2023 17:54  Patient On (Oxygen Delivery Method): room air    Physical exam:  General: Mild painful distress, awake and alert  Eyes: Anicteric sclerae, moist conjunctivae, see below for CNs  Extremities: No edema    Neurologic:  -Mental status: Awake, alert, oriented to person, place, and time. Speech is fluent with intact  naming, repetition, and comprehension, mild dysarthria. Follows commands. Attention/concentration intact. Fund of knowledge appropriate.  -Cranial nerves:   II: Visual fields are full to confrontation.  III, IV, VI: Extraocular movements are intact without nystagmus. Pupils equally round and reactive to light  V:  Facial sensation V1-V3 equal and intact   VII: L facial droop  XII: Tongue protrudes midline  Motor: Normal bulk and tone. No pronator drift. RUE and RLE 5/5. LUE 2/5 - exam limited 2/2 to pain, can wiggle fingers and bend at the elbow, unable to lift off the bed antigravity. LLE 4/5 with slight drift.  Sensation: Intact to light touch bilaterally. No neglect or extinction on double simultaneous testing.  Coordination: Unable to assess 2/2 to pain  Gait: Deferred    LABS:                        13.5   8.09  )-----------( 178      ( 10 Jul 2023 05:30 )             42.4     07-10    136  |  103  |  25<H>  ----------------------------<  164<H>  3.9   |  22  |  1.48<H>    Ca    9.6      10 Jul 2023 05:30  Phos  2.6     07-10  Mg     1.3     07-10        Urinalysis Basic - ( 10 Jul 2023 05:30 )    Color: x / Appearance: x / SG: x / pH: x  Gluc: 164 mg/dL / Ketone: x  / Bili: x / Urobili: x   Blood: x / Protein: x / Nitrite: x   Leuk Esterase: x / RBC: x / WBC x   Sq Epi: x / Non Sq Epi: x / Bacteria: x        RADIOLOGY & ADDITIONAL TESTS:  < from: MR Head No Cont (07.09.23 @ 16:07) >    IMPRESSION:    An 8 mm cortically based nodule in the posterior-superior frontal lobe   (motor strip) is present with extensive vasogenic edema. Metastasis or   focus of atypical infection are leading differential considerations. The   patient should return for contrast-enhanced MR imaging (unless   contraindicated) to assess for multiplicity.    No recent infarct. Chronic infarcts and white matter microangiopathic   change as above.    < end of copied text >    < from: TTE Echo Complete w/o Contrast w/ Doppler (07.10.23 @ 13:25) >  CONCLUSIONS:     1. Normal left ventricular size and systolic function.   2. Moderate symmetric left ventricular hypertrophy.   3. Normal right ventricular size and systolic function.   4. Normal atria.   5. Aortic sclerosis without significant stenosis.   6. No evidence of pulmonary hypertension.   7. No pericardial effusion.   8. No prior echo is available for comparison.       *** Transfer from Stroke Service to Medicine ***    Ms. Minor is a 73 year old female with a PMHx of HTN, HLD, DM, Right hip replacement, RA, CKD (Cr baseline ~2) who presented to the  ED for unsteadiness and dizziness x1 week. Last known normal was a week ago. mRS of 2, walks with a rolling walker but is able to care for her basic everyday needs. NIH of 5 for a Left facial and dysarthria and LUE weakness. 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 Her daughter at bedside states that she is at her baseline. LUE is pain limited due to her rheumatoid arthritis but patient states that she definitely has noticed it to be slightly weaker than usual. Otherwise no numbness or tingling, no headache, no N/V. CT Head was completed in the ED concerning for age indeterminate L cerebellar lacunar infarcts, with an addendum added and patient was found to have area of vasogenic edema in the right frontoparietal region with associated sulcal effacement which could indicate underlying mass. CTA was deferred due to CKD. She was admitted to stroke tele for further workup, started on DAPT and high dose statin. MR brain without contrast showed an 8mm cortically based nodule in the posterior-superior frontal lobe with extensive vasogenic edema c/f mets versus atypical infection. MRI w/ contrast was ordered to further evaluate potential mass. MR Angio Brain and neck were unremarkable and did not show arterial steno-occlusive disease. Ms. Minor was admitted to stroke neurology for work up under Dr. Canada. Following this, DAPT and statin discontinued to decrease risk of ICH, patient's home RA meds (Plaquenil, Sulfasalazine) were restarted and neurosurgery was consulted who recommended CT chest, abdomen and pelvis to r/o malignancy. Ms. Minor was transferred from the stroke service to medicine under Dr. Douglass for malignancy workup.      INTERVAL HPI/OVERNIGHT EVENTS:  Patient seen and examined. O/N patient required IVP Hydral x 2 for elevated SBPs. Also noted to have developed hives, states she's sensitive to new detergents, got Benadryl 25 mg PO. Symptoms resolved.     MEDICATIONS  (STANDING):  amLODIPine   Tablet 10 milliGRAM(s) Oral every 24 hours  dextrose 5%. 1000 milliLiter(s) (50 mL/Hr) IV Continuous <Continuous>  dextrose 5%. 1000 milliLiter(s) (100 mL/Hr) IV Continuous <Continuous>  dextrose 50% Injectable 25 Gram(s) IV Push once  dextrose 50% Injectable 12.5 Gram(s) IV Push once  dextrose 50% Injectable 25 Gram(s) IV Push once  glucagon  Injectable 1 milliGRAM(s) IntraMuscular once  hydroxychloroquine 200 milliGRAM(s) Oral every 12 hours  ibuprofen  Tablet. 600 milliGRAM(s) Oral once  insulin lispro (ADMELOG) corrective regimen sliding scale   SubCutaneous three times a day before meals  insulin lispro (ADMELOG) corrective regimen sliding scale   SubCutaneous at bedtime  iohexol 300 mG (iodine)/mL Oral Solution 30 milliLiter(s) Oral once  lidocaine   4% Patch 1 Patch Transdermal every 24 hours  losartan 100 milliGRAM(s) Oral every 24 hours  metoprolol succinate  milliGRAM(s) Oral every 24 hours  sulfaSALAzine 1000 milliGRAM(s) Oral every 12 hours    MEDICATIONS  (PRN):  acetaminophen     Tablet .. 650 milliGRAM(s) Oral every 6 hours PRN Temp greater or equal to 38.5C (101.3F), Mild Pain (1 - 3), Moderate Pain (4 - 6)  dextrose Oral Gel 15 Gram(s) Oral once PRN Blood Glucose LESS THAN 70 milliGRAM(s)/deciliter      ALLERGIES:  No Known Allergies    VITAL SIGNS LAST 24 HOURS:  T(C): 36.8 (10 Jul 2023 18:15), Max: 37.2 (10 Jul 2023 05:03)  T(F): 98.3 (10 Jul 2023 18:15), Max: 99 (10 Jul 2023 05:03)  HR: 61 (10 Jul 2023 17:54) (61 - 90)  BP: 114/63 (10 Jul 2023 17:54) (114/63 - 197/91)  BP(mean): 83 (10 Jul 2023 17:54) (83 - 130)  RR: 20 (10 Jul 2023 17:54) (19 - 34)  SpO2: 95% (10 Jul 2023 17:54) (94% - 99%)    Parameters below as of 10 Jul 2023 17:54  Patient On (Oxygen Delivery Method): room air    Physical exam:  General: Mild painful distress, awake and alert  Eyes: Anicteric sclerae, moist conjunctivae, see below for CNs  Extremities: No edema    Neurologic:  -Mental status: Awake, alert, oriented to person, place, and time. Speech is fluent with intact  naming, repetition, and comprehension, mild dysarthria. Follows commands. Attention/concentration intact. Fund of knowledge appropriate.  -Cranial nerves:   II: Visual fields are full to confrontation.  III, IV, VI: Extraocular movements are intact without nystagmus. Pupils equally round and reactive to light  V:  Facial sensation V1-V3 equal and intact   VII: L facial droop  XII: Tongue protrudes midline  Motor: Normal bulk and tone. No pronator drift. RUE and RLE 5/5. LUE 2/5 - exam limited 2/2 to pain, can wiggle fingers and bend at the elbow, unable to lift off the bed antigravity. LLE 4/5 with slight drift.  Sensation: Intact to light touch bilaterally. No neglect or extinction on double simultaneous testing.  Coordination: Unable to assess 2/2 to pain  Gait: Deferred    LABS:                        13.5   8.09  )-----------( 178      ( 10 Jul 2023 05:30 )             42.4     07-10    136  |  103  |  25<H>  ----------------------------<  164<H>  3.9   |  22  |  1.48<H>    Ca    9.6      10 Jul 2023 05:30  Phos  2.6     07-10  Mg     1.3     07-10        Urinalysis Basic - ( 10 Jul 2023 05:30 )    Color: x / Appearance: x / SG: x / pH: x  Gluc: 164 mg/dL / Ketone: x  / Bili: x / Urobili: x   Blood: x / Protein: x / Nitrite: x   Leuk Esterase: x / RBC: x / WBC x   Sq Epi: x / Non Sq Epi: x / Bacteria: x        RADIOLOGY & ADDITIONAL TESTS:  < from: MR Head No Cont (07.09.23 @ 16:07) >    IMPRESSION:    An 8 mm cortically based nodule in the posterior-superior frontal lobe   (motor strip) is present with extensive vasogenic edema. Metastasis or   focus of atypical infection are leading differential considerations. The   patient should return for contrast-enhanced MR imaging (unless   contraindicated) to assess for multiplicity.    No recent infarct. Chronic infarcts and white matter microangiopathic   change as above.    < end of copied text >    < from: TTE Echo Complete w/o Contrast w/ Doppler (07.10.23 @ 13:25) >  CONCLUSIONS:     1. Normal left ventricular size and systolic function.   2. Moderate symmetric left ventricular hypertrophy.   3. Normal right ventricular size and systolic function.   4. Normal atria.   5. Aortic sclerosis without significant stenosis.   6. No evidence of pulmonary hypertension.   7. No pericardial effusion.   8. No prior echo is available for comparison.       *** Transfer from Stroke Service to Medicine ***    Ms. Minor is a 73 year old female with a PMHx of HTN, HLD, DM, Right hip replacement, RA, CKD (Cr baseline ~2) who presented to the  ED for unsteadiness and dizziness x1 week. Last known normal was a week ago. mRS of 2, walks with a rolling walker but is able to care for her basic everyday needs. NIH of 5 for a Left facial and dysarthria and LUE weakness. 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 Her daughter at bedside states that she is at her baseline. LUE is pain limited due to her rheumatoid arthritis but patient states that she definitely has noticed it to be slightly weaker than usual. Otherwise no numbness or tingling, no headache, no N/V. CT Head was completed in the ED concerning for age indeterminate L cerebellar lacunar infarcts, with an addendum added and patient was found to have area of vasogenic edema in the right frontoparietal region with associated sulcal effacement which could indicate underlying mass. CTA was deferred due to CKD. She was admitted to stroke tele for further workup, started on DAPT and high dose statin. MR brain without contrast showed an 8mm cortically based nodule in the posterior-superior frontal lobe with extensive vasogenic edema c/f mets versus atypical infection. MRI w/ contrast was ordered to further evaluate potential mass. MR Angio Brain and neck were unremarkable and did not show arterial steno-occlusive disease. Ms. Minor was admitted to stroke neurology for work up under Dr. Canada. Following this, DAPT and statin discontinued to decrease risk of ICH, patient's home RA meds (Plaquenil, Sulfasalazine) were restarted and neurosurgery was consulted who recommended CT chest, abdomen and pelvis to r/o malignancy. Ms. Minor was transferred from the stroke service to medicine under Dr. Douglass for continued malignancy workup.      INTERVAL HPI/OVERNIGHT EVENTS:  Patient seen and examined. O/N patient required IVP Hydral x 2 for elevated SBPs. Also noted to have developed hives, states she's sensitive to new detergents, got Benadryl 25 mg PO. Symptoms resolved.     MEDICATIONS  (STANDING):  amLODIPine   Tablet 10 milliGRAM(s) Oral every 24 hours  dextrose 5%. 1000 milliLiter(s) (50 mL/Hr) IV Continuous <Continuous>  dextrose 5%. 1000 milliLiter(s) (100 mL/Hr) IV Continuous <Continuous>  dextrose 50% Injectable 25 Gram(s) IV Push once  dextrose 50% Injectable 12.5 Gram(s) IV Push once  dextrose 50% Injectable 25 Gram(s) IV Push once  glucagon  Injectable 1 milliGRAM(s) IntraMuscular once  hydroxychloroquine 200 milliGRAM(s) Oral every 12 hours  ibuprofen  Tablet. 600 milliGRAM(s) Oral once  insulin lispro (ADMELOG) corrective regimen sliding scale   SubCutaneous three times a day before meals  insulin lispro (ADMELOG) corrective regimen sliding scale   SubCutaneous at bedtime  iohexol 300 mG (iodine)/mL Oral Solution 30 milliLiter(s) Oral once  lidocaine   4% Patch 1 Patch Transdermal every 24 hours  losartan 100 milliGRAM(s) Oral every 24 hours  metoprolol succinate  milliGRAM(s) Oral every 24 hours  sulfaSALAzine 1000 milliGRAM(s) Oral every 12 hours    MEDICATIONS  (PRN):  acetaminophen     Tablet .. 650 milliGRAM(s) Oral every 6 hours PRN Temp greater or equal to 38.5C (101.3F), Mild Pain (1 - 3), Moderate Pain (4 - 6)  dextrose Oral Gel 15 Gram(s) Oral once PRN Blood Glucose LESS THAN 70 milliGRAM(s)/deciliter      ALLERGIES:  No Known Allergies    VITAL SIGNS LAST 24 HOURS:  T(C): 36.8 (10 Jul 2023 18:15), Max: 37.2 (10 Jul 2023 05:03)  T(F): 98.3 (10 Jul 2023 18:15), Max: 99 (10 Jul 2023 05:03)  HR: 61 (10 Jul 2023 17:54) (61 - 90)  BP: 114/63 (10 Jul 2023 17:54) (114/63 - 197/91)  BP(mean): 83 (10 Jul 2023 17:54) (83 - 130)  RR: 20 (10 Jul 2023 17:54) (19 - 34)  SpO2: 95% (10 Jul 2023 17:54) (94% - 99%)    Parameters below as of 10 Jul 2023 17:54  Patient On (Oxygen Delivery Method): room air    Physical exam:  General: Mild painful distress, awake and alert  Eyes: Anicteric sclerae, moist conjunctivae, see below for CNs  Extremities: No edema    Neurologic:  -Mental status: Awake, alert, oriented to person, place, and time. Speech is fluent with intact  naming, repetition, and comprehension, mild dysarthria. Follows commands. Attention/concentration intact. Fund of knowledge appropriate.  -Cranial nerves:   II: Visual fields are full to confrontation.  III, IV, VI: Extraocular movements are intact without nystagmus. Pupils equally round and reactive to light  V:  Facial sensation V1-V3 equal and intact   VII: L facial droop  XII: Tongue protrudes midline  Motor: Normal bulk and tone. No pronator drift. RUE and RLE 5/5. LUE 2/5 - exam limited 2/2 to pain, can wiggle fingers and bend at the elbow, unable to lift off the bed antigravity. LLE 4/5 with slight drift.  Sensation: Intact to light touch bilaterally. No neglect or extinction on double simultaneous testing.  Coordination: Unable to assess 2/2 to pain  Gait: Deferred    LABS:                        13.5   8.09  )-----------( 178      ( 10 Jul 2023 05:30 )             42.4     07-10    136  |  103  |  25<H>  ----------------------------<  164<H>  3.9   |  22  |  1.48<H>    Ca    9.6      10 Jul 2023 05:30  Phos  2.6     07-10  Mg     1.3     07-10        Urinalysis Basic - ( 10 Jul 2023 05:30 )    Color: x / Appearance: x / SG: x / pH: x  Gluc: 164 mg/dL / Ketone: x  / Bili: x / Urobili: x   Blood: x / Protein: x / Nitrite: x   Leuk Esterase: x / RBC: x / WBC x   Sq Epi: x / Non Sq Epi: x / Bacteria: x        RADIOLOGY & ADDITIONAL TESTS:  < from: MR Head No Cont (07.09.23 @ 16:07) >    IMPRESSION:    An 8 mm cortically based nodule in the posterior-superior frontal lobe   (motor strip) is present with extensive vasogenic edema. Metastasis or   focus of atypical infection are leading differential considerations. The   patient should return for contrast-enhanced MR imaging (unless   contraindicated) to assess for multiplicity.    No recent infarct. Chronic infarcts and white matter microangiopathic   change as above.    < end of copied text >    < from: TTE Echo Complete w/o Contrast w/ Doppler (07.10.23 @ 13:25) >  CONCLUSIONS:     1. Normal left ventricular size and systolic function.   2. Moderate symmetric left ventricular hypertrophy.   3. Normal right ventricular size and systolic function.   4. Normal atria.   5. Aortic sclerosis without significant stenosis.   6. No evidence of pulmonary hypertension.   7. No pericardial effusion.   8. No prior echo is available for comparison.       *** Transfer from Stroke Service to Medicine ***    Ms. Minor is a 73 year old female with a PMHx of HTN, HLD, DM, Right hip replacement, RA, CKD (Cr baseline ~2) who presented to the  ED for unsteadiness and dizziness x1 week. Last known normal was a week ago. mRS of 2, walks with a rolling walker but is able to care for her basic everyday needs. NIH of 5 for a Left facial and dysarthria and LUE weakness. 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 Her daughter at bedside states that she is at her baseline. LUE is pain limited due to her rheumatoid arthritis but patient states that she definitely has noticed it to be slightly weaker than usual. Otherwise no numbness or tingling, no headache, no N/V. CT Head was completed in the ED concerning for age indeterminate L cerebellar lacunar infarcts, with an addendum added and patient was found to have area of vasogenic edema in the right frontoparietal region with associated sulcal effacement which could indicate underlying mass. CTA was deferred due to CKD. She was admitted to stroke tele for further workup, started on DAPT and high dose statin. MR brain without contrast showed an 8mm cortically based nodule in the posterior-superior frontal lobe with extensive vasogenic edema c/f mets versus atypical infection. MRI w/ contrast was ordered to further evaluate potential mass. MR Angio Brain and neck were unremarkable and did not show arterial steno-occlusive disease. Ms. Minor was admitted to stroke neurology for work up under Dr. Canada. Following this, DAPT and statin discontinued to decrease risk of ICH, patient's home RA meds (Plaquenil, Sulfasalazine) were restarted and neurosurgery was consulted who recommended CT chest, abdomen and pelvis to r/o malignancy. Ms. Minor was transferred from the stroke service to medicine under Dr. Douglass for continued malignancy workup.      INTERVAL HPI/OVERNIGHT EVENTS:  Patient seen and examined. O/N patient required IVP Hydral x 2 for elevated SBPs. Also noted to have developed hives, states she's sensitive to new detergents, got Benadryl 25 mg PO. Symptoms resolved.     MEDICATIONS  (STANDING):  amLODIPine   Tablet 10 milliGRAM(s) Oral every 24 hours  dextrose 5%. 1000 milliLiter(s) (50 mL/Hr) IV Continuous <Continuous>  dextrose 5%. 1000 milliLiter(s) (100 mL/Hr) IV Continuous <Continuous>  dextrose 50% Injectable 25 Gram(s) IV Push once  dextrose 50% Injectable 12.5 Gram(s) IV Push once  dextrose 50% Injectable 25 Gram(s) IV Push once  glucagon  Injectable 1 milliGRAM(s) IntraMuscular once  hydroxychloroquine 200 milliGRAM(s) Oral every 12 hours  ibuprofen  Tablet. 600 milliGRAM(s) Oral once  insulin lispro (ADMELOG) corrective regimen sliding scale   SubCutaneous three times a day before meals  insulin lispro (ADMELOG) corrective regimen sliding scale   SubCutaneous at bedtime  iohexol 300 mG (iodine)/mL Oral Solution 30 milliLiter(s) Oral once  lidocaine   4% Patch 1 Patch Transdermal every 24 hours  losartan 100 milliGRAM(s) Oral every 24 hours  metoprolol succinate  milliGRAM(s) Oral every 24 hours  sulfaSALAzine 1000 milliGRAM(s) Oral every 12 hours    MEDICATIONS  (PRN):  acetaminophen     Tablet .. 650 milliGRAM(s) Oral every 6 hours PRN Temp greater or equal to 38.5C (101.3F), Mild Pain (1 - 3), Moderate Pain (4 - 6)  dextrose Oral Gel 15 Gram(s) Oral once PRN Blood Glucose LESS THAN 70 milliGRAM(s)/deciliter    ALLERGIES:  No Known Allergies    VITAL SIGNS LAST 24 HOURS:  T(C): 36.8 (10 Jul 2023 18:15), Max: 37.2 (10 Jul 2023 05:03)  T(F): 98.3 (10 Jul 2023 18:15), Max: 99 (10 Jul 2023 05:03)  HR: 61 (10 Jul 2023 17:54) (61 - 90)  BP: 114/63 (10 Jul 2023 17:54) (114/63 - 197/91)  BP(mean): 83 (10 Jul 2023 17:54) (83 - 130)  RR: 20 (10 Jul 2023 17:54) (19 - 34)  SpO2: 95% (10 Jul 2023 17:54) (94% - 99%)    Parameters below as of 10 Jul 2023 17:54  Patient On (Oxygen Delivery Method): room air    Physical exam:  General: Mild painful distress, awake and alert  Eyes: Anicteric sclerae, moist conjunctivae, see below for CNs  Extremities: No edema    Neurologic:  -Mental status: Awake, alert, oriented to person, place, and time. Speech is fluent with intact  naming, repetition, and comprehension, mild dysarthria. Follows commands. Attention/concentration intact. Fund of knowledge appropriate.  -Cranial nerves:   II: Visual fields are full to confrontation.  III, IV, VI: Extraocular movements are intact without nystagmus. Pupils equally round and reactive to light  V:  Facial sensation V1-V3 equal and intact   VII: L facial droop  XII: Tongue protrudes midline  Motor: Normal bulk and tone. No pronator drift. RUE and RLE 5/5. LUE 2/5 - exam limited 2/2 to pain, can wiggle fingers and bend at the elbow, unable to lift off the bed antigravity. LLE 4/5 with slight drift.  Sensation: Intact to light touch bilaterally. No neglect or extinction on double simultaneous testing.  Coordination: Unable to assess 2/2 to pain  Gait: Deferred    LABS:                        13.5   8.09  )-----------( 178      ( 10 Jul 2023 05:30 )             42.4     07-10    136  |  103  |  25<H>  ----------------------------<  164<H>  3.9   |  22  |  1.48<H>    Ca    9.6      10 Jul 2023 05:30  Phos  2.6     07-10  Mg     1.3     07-10        Urinalysis Basic - ( 10 Jul 2023 05:30 )    Color: x / Appearance: x / SG: x / pH: x  Gluc: 164 mg/dL / Ketone: x  / Bili: x / Urobili: x   Blood: x / Protein: x / Nitrite: x   Leuk Esterase: x / RBC: x / WBC x   Sq Epi: x / Non Sq Epi: x / Bacteria: x        RADIOLOGY & ADDITIONAL TESTS:  < from: MR Head No Cont (07.09.23 @ 16:07) >    IMPRESSION:    An 8 mm cortically based nodule in the posterior-superior frontal lobe   (motor strip) is present with extensive vasogenic edema. Metastasis or   focus of atypical infection are leading differential considerations. The   patient should return for contrast-enhanced MR imaging (unless   contraindicated) to assess for multiplicity.    No recent infarct. Chronic infarcts and white matter microangiopathic   change as above.    < end of copied text >    < from: TTE Echo Complete w/o Contrast w/ Doppler (07.10.23 @ 13:25) >  CONCLUSIONS:     1. Normal left ventricular size and systolic function.   2. Moderate symmetric left ventricular hypertrophy.   3. Normal right ventricular size and systolic function.   4. Normal atria.   5. Aortic sclerosis without significant stenosis.   6. No evidence of pulmonary hypertension.   7. No pericardial effusion.   8. No prior echo is available for comparison.       *** Transfer from Stroke Service to Medicine ***    Ms. Minor is a 73 year old female with a PMHx of HTN, HLD, DM, Right hip replacement, RA, CKD (Cr baseline ~2) who presented to the  ED for unsteadiness and dizziness x1 week. Last known normal was a week ago. mRS of 2, walks with a rolling walker but is able to care for her basic everyday needs. NIH of 5 for a Left facial and dysarthria and LUE weakness. 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 Her daughter at bedside states that she is at her baseline. LUE is pain limited due to her rheumatoid arthritis but patient states that she definitely has noticed it to be slightly weaker than usual. Otherwise no numbness or tingling, no headache, no N/V. CT Head was completed in the ED concerning for age indeterminate L cerebellar lacunar infarcts, with an addendum added and patient was found to have area of vasogenic edema in the right frontoparietal region with associated sulcal effacement which could indicate underlying mass. CTA was deferred due to CKD. She was admitted to stroke tele for further workup, started on DAPT and high dose statin. MR brain without contrast showed an 8mm cortically based nodule in the posterior-superior frontal lobe with extensive vasogenic edema c/f mets versus atypical infection. MRI w/ contrast was ordered to further evaluate potential mass. MR Angio Brain and neck were unremarkable and did not show arterial steno-occlusive disease. Ms. Minor was admitted to stroke neurology for work up under Dr. Canada. Following this, DAPT and statin discontinued to decrease risk of ICH, patient's home RA meds (Plaquenil, Sulfasalazine) were restarted and neurosurgery was consulted who recommended CT chest, abdomen and pelvis to r/o malignancy. Ms. Minor was transferred from the stroke service to medicine under Dr. Douglass for continued malignancy workup.      INTERVAL HPI/OVERNIGHT EVENTS:  Patient seen and examined. O/N patient required IVP Hydral x 2 for elevated SBPs. Also noted to have developed hives, states she's sensitive to new detergents, got Benadryl 25 mg PO. Symptoms resolved.     MEDICATIONS  (STANDING):  amLODIPine   Tablet 10 milliGRAM(s) Oral every 24 hours  dextrose 5%. 1000 milliLiter(s) (50 mL/Hr) IV Continuous <Continuous>  dextrose 5%. 1000 milliLiter(s) (100 mL/Hr) IV Continuous <Continuous>  dextrose 50% Injectable 25 Gram(s) IV Push once  dextrose 50% Injectable 12.5 Gram(s) IV Push once  dextrose 50% Injectable 25 Gram(s) IV Push once  glucagon  Injectable 1 milliGRAM(s) IntraMuscular once  hydroxychloroquine 200 milliGRAM(s) Oral every 12 hours  ibuprofen  Tablet. 600 milliGRAM(s) Oral once  insulin lispro (ADMELOG) corrective regimen sliding scale   SubCutaneous three times a day before meals  insulin lispro (ADMELOG) corrective regimen sliding scale   SubCutaneous at bedtime  iohexol 300 mG (iodine)/mL Oral Solution 30 milliLiter(s) Oral once  lidocaine   4% Patch 1 Patch Transdermal every 24 hours  losartan 100 milliGRAM(s) Oral every 24 hours  metoprolol succinate  milliGRAM(s) Oral every 24 hours  sulfaSALAzine 1000 milliGRAM(s) Oral every 12 hours    MEDICATIONS  (PRN):  acetaminophen     Tablet .. 650 milliGRAM(s) Oral every 6 hours PRN Temp greater or equal to 38.5C (101.3F), Mild Pain (1 - 3), Moderate Pain (4 - 6)  dextrose Oral Gel 15 Gram(s) Oral once PRN Blood Glucose LESS THAN 70 milliGRAM(s)/deciliter    ALLERGIES:  No Known Allergies    VITAL SIGNS LAST 24 HOURS:  T(C): 36.8 (10 Jul 2023 18:15), Max: 37.2 (10 Jul 2023 05:03)  T(F): 98.3 (10 Jul 2023 18:15), Max: 99 (10 Jul 2023 05:03)  HR: 61 (10 Jul 2023 17:54) (61 - 90)  BP: 114/63 (10 Jul 2023 17:54) (114/63 - 197/91)  BP(mean): 83 (10 Jul 2023 17:54) (83 - 130)  RR: 20 (10 Jul 2023 17:54) (19 - 34)  SpO2: 95% (10 Jul 2023 17:54) (94% - 99%)    Parameters below as of 10 Jul 2023 17:54  Patient On (Oxygen Delivery Method): room air    Physical exam:  General: NAD, awake and alert, Aox4  Eyes: Anicteric sclerae, moist conjunctivae, see below for CNs  Extremities: No edema  Cardio- RRR no M/R/G  Pulm- CTA b/l  Abdomen: soft non distended, NTTP    Neurologic:  -Mental status: Awake, alert, oriented to person, place, and time. Speech is fluent with intact  naming, repetition, and comprehension, dysarthria. Follows commands. Attention/concentration intact. Fund of knowledge appropriate.  -Cranial nerves:   II: Visual fields are full to confrontation.  III, IV, VI: Extraocular movements are intact without nystagmus. Pupils equally round and reactive to light  V:  Facial sensation V1-V3 equal and intact   VII: L facial droop  XII: Tongue protrudes midline  Motor: Normal bulk and tone. No pronator drift. RUE and RLE 5/5. LUE 2/5, unable to lift off the bed antigravity. LLE 3/5 with slight drift.  Sensation: Intact to light touch bilaterally. No neglect or extinction on double simultaneous testing.  Coordination: R side intact, L unable to assess due to inability to life.   Gait: Deferred    LABS:                        13.5   8.09  )-----------( 178      ( 10 Jul 2023 05:30 )             42.4     07-10    136  |  103  |  25<H>  ----------------------------<  164<H>  3.9   |  22  |  1.48<H>    Ca    9.6      10 Jul 2023 05:30  Phos  2.6     07-10  Mg     1.3     07-10        Urinalysis Basic - ( 10 Jul 2023 05:30 )    Color: x / Appearance: x / SG: x / pH: x  Gluc: 164 mg/dL / Ketone: x  / Bili: x / Urobili: x   Blood: x / Protein: x / Nitrite: x   Leuk Esterase: x / RBC: x / WBC x   Sq Epi: x / Non Sq Epi: x / Bacteria: x        RADIOLOGY & ADDITIONAL TESTS:  < from: MR Head No Cont (07.09.23 @ 16:07) >    IMPRESSION:    An 8 mm cortically based nodule in the posterior-superior frontal lobe   (motor strip) is present with extensive vasogenic edema. Metastasis or   focus of atypical infection are leading differential considerations. The   patient should return for contrast-enhanced MR imaging (unless   contraindicated) to assess for multiplicity.    No recent infarct. Chronic infarcts and white matter microangiopathic   change as above.    < end of copied text >    < from: TTE Echo Complete w/o Contrast w/ Doppler (07.10.23 @ 13:25) >  CONCLUSIONS:     1. Normal left ventricular size and systolic function.   2. Moderate symmetric left ventricular hypertrophy.   3. Normal right ventricular size and systolic function.   4. Normal atria.   5. Aortic sclerosis without significant stenosis.   6. No evidence of pulmonary hypertension.   7. No pericardial effusion.   8. No prior echo is available for comparison.       *** Transfer from Stroke Service to Medicine ***    Ms. Minor is a 73 year old female with a PMHx of HTN, HLD, DM, Right hip replacement, RA, CKD (Cr baseline ~2) who presented to the  ED for unsteadiness and dizziness x1 week. Last known normal was a week ago. mRS of 2, walks with a rolling walker but is able to care for her basic everyday needs. NIH of 5 for a Left facial and dysarthria and LUE weakness. 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 Her daughter at bedside states that she is at her baseline. LUE is pain limited due to her rheumatoid arthritis but patient states that she definitely has noticed it to be slightly weaker than usual. Otherwise no numbness or tingling, no headache, no N/V. CT Head was completed in the ED concerning for age indeterminate L cerebellar lacunar infarcts, with an addendum added and patient was found to have area of vasogenic edema in the right frontoparietal region with associated sulcal effacement which could indicate underlying mass. CTA was deferred due to CKD. She was admitted to stroke tele for further workup, started on DAPT and high dose statin. MR brain without contrast showed an 8mm cortically based nodule in the posterior-superior frontal lobe with extensive vasogenic edema c/f mets versus atypical infection. MRI w/ contrast was ordered to further evaluate potential mass. MR Angio Brain and neck were unremarkable and did not show arterial steno-occlusive disease. Ms. Minor was admitted to stroke neurology for work up under Dr. Canada. Following this, DAPT and statin discontinued to decrease risk of ICH, patient's home RA meds (Plaquenil, Sulfasalazine) were restarted and neurosurgery was consulted who recommended CT chest, abdomen and pelvis to r/o malignancy. Ms. Minor was transferred from the stroke service to medicine under Dr. Douglass for continued malignancy workup.      INTERVAL HPI/OVERNIGHT EVENTS:  Patient seen and examined. O/N patient required IVP Hydral x 2 for elevated SBPs. Also noted to have developed hives, states she's sensitive to new detergents, got Benadryl 25 mg PO. Symptoms resolved.     MEDICATIONS  (STANDING):  amLODIPine   Tablet 10 milliGRAM(s) Oral every 24 hours  dextrose 5%. 1000 milliLiter(s) (50 mL/Hr) IV Continuous <Continuous>  dextrose 5%. 1000 milliLiter(s) (100 mL/Hr) IV Continuous <Continuous>  dextrose 50% Injectable 25 Gram(s) IV Push once  dextrose 50% Injectable 12.5 Gram(s) IV Push once  dextrose 50% Injectable 25 Gram(s) IV Push once  glucagon  Injectable 1 milliGRAM(s) IntraMuscular once  hydroxychloroquine 200 milliGRAM(s) Oral every 12 hours  ibuprofen  Tablet. 600 milliGRAM(s) Oral once  insulin lispro (ADMELOG) corrective regimen sliding scale   SubCutaneous three times a day before meals  insulin lispro (ADMELOG) corrective regimen sliding scale   SubCutaneous at bedtime  iohexol 300 mG (iodine)/mL Oral Solution 30 milliLiter(s) Oral once  lidocaine   4% Patch 1 Patch Transdermal every 24 hours  losartan 100 milliGRAM(s) Oral every 24 hours  metoprolol succinate  milliGRAM(s) Oral every 24 hours  sulfaSALAzine 1000 milliGRAM(s) Oral every 12 hours    MEDICATIONS  (PRN):  acetaminophen     Tablet .. 650 milliGRAM(s) Oral every 6 hours PRN Temp greater or equal to 38.5C (101.3F), Mild Pain (1 - 3), Moderate Pain (4 - 6)  dextrose Oral Gel 15 Gram(s) Oral once PRN Blood Glucose LESS THAN 70 milliGRAM(s)/deciliter    ALLERGIES:  No Known Allergies    VITAL SIGNS LAST 24 HOURS:  T(C): 36.8 (10 Jul 2023 18:15), Max: 37.2 (10 Jul 2023 05:03)  T(F): 98.3 (10 Jul 2023 18:15), Max: 99 (10 Jul 2023 05:03)  HR: 61 (10 Jul 2023 17:54) (61 - 90)  BP: 114/63 (10 Jul 2023 17:54) (114/63 - 197/91)  BP(mean): 83 (10 Jul 2023 17:54) (83 - 130)  RR: 20 (10 Jul 2023 17:54) (19 - 34)  SpO2: 95% (10 Jul 2023 17:54) (94% - 99%)    Parameters below as of 10 Jul 2023 17:54  Patient On (Oxygen Delivery Method): room air    Physical exam:  General: NAD, awake and alert, Aox4  Eyes: Anicteric sclerae, moist conjunctivae, see below for CNs  Extremities: No edema, warm  CV- RRR no rubs no gallops  Pulm- CTA b/l  Abdomen: soft non distended, NTTP  Skin- no rashes.lesions    Neurologic:  -Mental status: Awake, alert, oriented to person, place, and time. Speech is fluent with intact naming, repetition, and comprehension, dysarthria. Follows commands. Attention/concentration intact. Fund of knowledge appropriate.  -Cranial nerves:   II: Visual fields are full to confrontation.  III, IV, VI: Extraocular movements are intact without nystagmus. Pupils equally round and reactive to light  V:  Facial sensation V1-V3 equal and intact   VII: L facial droop  XII: Tongue protrudes midline  Motor: Normal bulk and tone. No pronator drift. RUE and RLE 5/5. LUE 2/5, unable to lift off the bed antigravity. LLE 3/5 with slight drift.  Sensation: Intact to light touch bilaterally. No neglect or extinction on double simultaneous testing.  Coordination: R side intact, L unable to assess due to inability to lift.   Gait: Deferred    LABS:                        13.5   8.09  )-----------( 178      ( 10 Jul 2023 05:30 )             42.4     07-10    136  |  103  |  25<H>  ----------------------------<  164<H>  3.9   |  22  |  1.48<H>    Ca    9.6      10 Jul 2023 05:30  Phos  2.6     07-10  Mg     1.3     07-10        Urinalysis Basic - ( 10 Jul 2023 05:30 )    Color: x / Appearance: x / SG: x / pH: x  Gluc: 164 mg/dL / Ketone: x  / Bili: x / Urobili: x   Blood: x / Protein: x / Nitrite: x   Leuk Esterase: x / RBC: x / WBC x   Sq Epi: x / Non Sq Epi: x / Bacteria: x        RADIOLOGY & ADDITIONAL TESTS:  < from: MR Head No Cont (07.09.23 @ 16:07) >    IMPRESSION:    An 8 mm cortically based nodule in the posterior-superior frontal lobe   (motor strip) is present with extensive vasogenic edema. Metastasis or   focus of atypical infection are leading differential considerations. The   patient should return for contrast-enhanced MR imaging (unless   contraindicated) to assess for multiplicity.    No recent infarct. Chronic infarcts and white matter microangiopathic   change as above.    < end of copied text >    < from: TTE Echo Complete w/o Contrast w/ Doppler (07.10.23 @ 13:25) >  CONCLUSIONS:     1. Normal left ventricular size and systolic function.   2. Moderate symmetric left ventricular hypertrophy.   3. Normal right ventricular size and systolic function.   4. Normal atria.   5. Aortic sclerosis without significant stenosis.   6. No evidence of pulmonary hypertension.   7. No pericardial effusion.   8. No prior echo is available for comparison.       *** Transfer from Stroke Service to Medicine ***    Ms. Minor is a 73 year old female with a PMHx of HTN, HLD, DM, Right hip replacement, RA, CKD (Cr baseline ~2) who presented to the  ED for unsteadiness and dizziness x1 week. Last known normal was a week ago. mRS of 2, walks with a rolling walker but is able to care for her basic everyday needs. NIH of 5 for a Left facial and dysarthria and LUE weakness. 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 Her daughter at bedside states that she is at her baseline. LUE is pain limited due to her rheumatoid arthritis but patient states that she definitely has noticed it to be slightly weaker than usual. Otherwise no numbness or tingling, no headache, no N/V. CT Head was completed in the ED concerning for age indeterminate L cerebellar lacunar infarcts, with an addendum added and patient was found to have area of vasogenic edema in the right frontoparietal region with associated sulcal effacement which could indicate underlying mass. CTA was deferred due to CKD. She was admitted to stroke tele for further workup, started on DAPT and high dose statin. MR brain without contrast showed an 8mm cortically based nodule in the posterior-superior frontal lobe with extensive vasogenic edema c/f mets versus atypical infection. MRI w/ contrast was ordered to further evaluate potential mass. MR Angio Brain and neck were unremarkable and did not show arterial steno-occlusive disease. Ms. Minor was admitted to stroke neurology for work up under Dr. Canada. Following this, DAPT and statin discontinued to decrease risk of ICH, patient's home RA meds (Plaquenil, Sulfasalazine) were restarted and neurosurgery was consulted who recommended CT chest, abdomen and pelvis to r/o malignancy. Ms. Minor was transferred from the stroke service to medicine under Dr. Douglass for continued malignancy workup.      INTERVAL HPI/OVERNIGHT EVENTS:  Patient seen and examined. O/N patient required IVP Hydral x 2 for elevated SBPs. Also noted to have developed hives, states she's sensitive to new detergents, got Benadryl 25 mg PO. Symptoms resolved.     MEDICATIONS  (STANDING):  amLODIPine   Tablet 10 milliGRAM(s) Oral every 24 hours  dextrose 5%. 1000 milliLiter(s) (50 mL/Hr) IV Continuous <Continuous>  dextrose 5%. 1000 milliLiter(s) (100 mL/Hr) IV Continuous <Continuous>  dextrose 50% Injectable 25 Gram(s) IV Push once  dextrose 50% Injectable 12.5 Gram(s) IV Push once  dextrose 50% Injectable 25 Gram(s) IV Push once  glucagon  Injectable 1 milliGRAM(s) IntraMuscular once  hydroxychloroquine 200 milliGRAM(s) Oral every 12 hours  ibuprofen  Tablet. 600 milliGRAM(s) Oral once  insulin lispro (ADMELOG) corrective regimen sliding scale   SubCutaneous three times a day before meals  insulin lispro (ADMELOG) corrective regimen sliding scale   SubCutaneous at bedtime  iohexol 300 mG (iodine)/mL Oral Solution 30 milliLiter(s) Oral once  lidocaine   4% Patch 1 Patch Transdermal every 24 hours  losartan 100 milliGRAM(s) Oral every 24 hours  metoprolol succinate  milliGRAM(s) Oral every 24 hours  sulfaSALAzine 1000 milliGRAM(s) Oral every 12 hours    MEDICATIONS  (PRN):  acetaminophen     Tablet .. 650 milliGRAM(s) Oral every 6 hours PRN Temp greater or equal to 38.5C (101.3F), Mild Pain (1 - 3), Moderate Pain (4 - 6)  dextrose Oral Gel 15 Gram(s) Oral once PRN Blood Glucose LESS THAN 70 milliGRAM(s)/deciliter    ALLERGIES:  No Known Allergies    VITAL SIGNS LAST 24 HOURS:  T(C): 36.8 (10 Jul 2023 18:15), Max: 37.2 (10 Jul 2023 05:03)  T(F): 98.3 (10 Jul 2023 18:15), Max: 99 (10 Jul 2023 05:03)  HR: 61 (10 Jul 2023 17:54) (61 - 90)  BP: 114/63 (10 Jul 2023 17:54) (114/63 - 197/91)  BP(mean): 83 (10 Jul 2023 17:54) (83 - 130)  RR: 20 (10 Jul 2023 17:54) (19 - 34)  SpO2: 95% (10 Jul 2023 17:54) (94% - 99%)    Parameters below as of 10 Jul 2023 17:54  Patient On (Oxygen Delivery Method): room air    Physical exam:  General: NAD, awake and alert, Aox4  Eyes: Anicteric sclerae, moist conjunctivae, see below for CNs  Extremities: No edema, warm  CV- RRR no rubs no gallops  Pulm- CTA b/l  Abdomen: soft non distended, NTTP  Skin- no rashes/lesions    Neurologic:  -Mental status: Awake, alert, oriented to person, place, and time. Speech is fluent with intact naming, repetition, and comprehension, dysarthria. Follows commands. Attention/concentration intact. Fund of knowledge appropriate.  -Cranial nerves:   II: Visual fields are full to confrontation.  III, IV, VI: Extraocular movements are intact without nystagmus. Pupils equally round and reactive to light  V:  Facial sensation V1-V3 equal and intact   VII: L facial droop  XII: Tongue protrudes midline  Motor: Normal bulk and tone. No pronator drift. RUE and RLE 5/5. LUE 2/5, unable to lift off the bed antigravity. LLE 3/5 with slight drift.  Sensation: Intact to light touch bilaterally. No neglect or extinction on double simultaneous testing.  Coordination: R side intact, L unable to assess due to inability to lift.   Gait: Deferred    LABS:                        13.5   8.09  )-----------( 178      ( 10 Jul 2023 05:30 )             42.4     07-10    136  |  103  |  25<H>  ----------------------------<  164<H>  3.9   |  22  |  1.48<H>    Ca    9.6      10 Jul 2023 05:30  Phos  2.6     07-10  Mg     1.3     07-10        Urinalysis Basic - ( 10 Jul 2023 05:30 )    Color: x / Appearance: x / SG: x / pH: x  Gluc: 164 mg/dL / Ketone: x  / Bili: x / Urobili: x   Blood: x / Protein: x / Nitrite: x   Leuk Esterase: x / RBC: x / WBC x   Sq Epi: x / Non Sq Epi: x / Bacteria: x        RADIOLOGY & ADDITIONAL TESTS:  < from: MR Head No Cont (07.09.23 @ 16:07) >    IMPRESSION:    An 8 mm cortically based nodule in the posterior-superior frontal lobe   (motor strip) is present with extensive vasogenic edema. Metastasis or   focus of atypical infection are leading differential considerations. The   patient should return for contrast-enhanced MR imaging (unless   contraindicated) to assess for multiplicity.    No recent infarct. Chronic infarcts and white matter microangiopathic   change as above.    < end of copied text >    < from: TTE Echo Complete w/o Contrast w/ Doppler (07.10.23 @ 13:25) >  CONCLUSIONS:     1. Normal left ventricular size and systolic function.   2. Moderate symmetric left ventricular hypertrophy.   3. Normal right ventricular size and systolic function.   4. Normal atria.   5. Aortic sclerosis without significant stenosis.   6. No evidence of pulmonary hypertension.   7. No pericardial effusion.   8. No prior echo is available for comparison.

## 2023-07-11 NOTE — PROGRESS NOTE ADULT - ASSESSMENT
73y Female with PMHx of HTN HLD, DM, R hip replacement, RA, CKD (Cr baseline ~2) presents to the  ED for unsteadiness and dizziness x1 week. LKN was a week ago. mRS of 2, walks with a rolling walker but is able to care for her basic everyday needs. NIH of 5 for L facial and dysarthria and LUE weakness. 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 Daughter at bedside states that she is at her baseline. LUE is pain limited to her rheumatoid arthritis but patient states that she definitely has noticed it to be slightly weaker than usual. Otherwise no numbness or tingling, no headache, N/V. CTH was completed in the ED concerning for age indeterminate L cerebellar lacunar infarcts. CTA deferred due to CKD, will obtain MRA with MR brain without contrast instead. Admit to stroke neurology for work up under Dr. Canada to stroke tele.     Neuro  #CVA workup  - ASA, Plavix, and Atorvastatin discontinued to prevent ICH  - q4hr stroke neuro checks and vitals  - MRA H/N without LVO or HGS   - MRI w/o contrast: An 8 mm cortically based nodule in the posterior-superior frontal lobe (motor strip) is present with extensive vasogenic edema. Metastasis or focus of atypical infection are leading differential considerations  - neurosurgery consulted for possible mass   - pending MRI w/ contrast w/ [facility]  - pending CT chest/abdomen/pelvis to r/o malignancy  - Stroke Code HCT Results: age indeterminate L cerebellar lacunar infarcts  - Stroke Code CTA Results: deferred due to CKD  - Stroke education    Cards  #HTN  - permissive hypertension, Goal SBP <180  - restarted BP meds, amlodipine 10mg, losartan 100mg, Toprol 100mg   - TTE: moderate LVH, grossly unremarkable  - Stroke Code EKG Results: NSR    #HLD  - holding statin   - LDL results: 69    Pulm  - call provider if SPO2 < 94%    GI  #Nutrition/Fluids/Electrolytes   - replete K<4 and Mg <2  - Diet: carb controlled    Renal  #CKD  - baseline Cr 2  - voiding    Infectious Disease  - afebrile    Rheum   #RA   - restarted home Sulfasalazine and Plaquenil  - Ibuprofen and Tylenol PRN for pain    Endocrine  #DM  - A1C results: 7.4   - ISS    - TSH results: 0.824    DVT Prophylaxis  - Lovenox sq for DVT prophylaxis   - SCDs for DVT prophylaxis      Discussed daily hospital plans and goals with patient     Discussed with Neurology Attending, Dr. Selina Canada Ms. Minor is a 73 year old female with a PMHx of HTN, HLD, DM, Right hip replacement, RA, CKD (Cr baseline ~2) who presented to the  ED for unsteadiness and dizziness x1 week with last known normal a week ago. NIH of 5 for a Left facial and dysarthria and LUE weakness and stated that she had a Left sided bells palsy in the past and her dysarthria is her baseline speech since she was a child with her daughter at bedside confirming that she is at her baseline. Patient presented with no numbness or tingling, no headache, no N/V. CT Head was completed in the ED concerning for age indeterminate L cerebellar lacunar infarcts, with an addendum added and patient was found to have area of vasogenic edema in the right frontoparietal region with associated sulcal effacement which could indicate underlying mass. She was admitted to stroke tele for further workup, started on DAPT and high dose statin. MR brain without contrast showed an 8mm cortically based nodule in the posterior-superior frontal lobe with extensive vasogenic edema c/f mets versus atypical infection. MRI w/ contrast was ordered to further evaluate potential mass. MR Angio Brain and neck were unremarkable and did not show arterial steno-occlusive disease. Ms. Minor was admitted to stroke neurology for work up following which DAPT and statin was discontinued to decrease risk of ICH, patient's home RA meds (Plaquenil, Sulfasalazine) were restarted and neurosurgery was consulted who recommended CT chest, abdomen and pelvis to r/o malignancy. Ms. Minor was subsequently transferred from the stroke service to medicine for malignancy workup. Ms. Minor is a 73 year old female with a PMHx of HTN, HLD, DM, Right hip replacement, RA, CKD (Cr baseline ~2) who presented to the  ED for unsteadiness and dizziness x1 week with last known normal a week ago. 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 Her daughter at bedside confirmed that she is at her baseline. Patient presented with no numbness or tingling, no headache, no N/V. CT Head was completed in the ED concerning for age indeterminate L cerebellar lacunar infarcts, with an addendum added noting patient was found to have area of vasogenic edema in the right frontoparietal region w/ associated sulcal effacement which could indicate underlying mass. She was admitted to stroke tele for further workup, started on DAPT and high dose statin. MR brain without contrast showed an 8mm cortically based nodule in the posterior-superior frontal lobe with extensive vasogenic edema c/f mets versus atypical infection. MRI w/ contrast was ordered to further evaluate potential mass. MR Angio brain and neck were unremarkable and did not show arterial steno-occlusive disease. Ms. Minor was admitted to stroke neurology for work up following which DAPT and statin was discontinued to decrease risk of ICH, patient's home RA meds (Plaquenil, Sulfasalazine) were restarted and neurosurgery was consulted who recommended CT chest, abdomen and pelvis to r/o malignancy. Ms. Minor was subsequently transferred from the stroke service to medicine for continued  malignancy workup.

## 2023-07-12 DIAGNOSIS — R91.1 SOLITARY PULMONARY NODULE: ICD-10-CM

## 2023-07-12 LAB
ANION GAP SERPL CALC-SCNC: 12 MMOL/L — SIGNIFICANT CHANGE UP (ref 5–17)
BASOPHILS # BLD AUTO: 0.05 K/UL — SIGNIFICANT CHANGE UP (ref 0–0.2)
BASOPHILS NFR BLD AUTO: 0.7 % — SIGNIFICANT CHANGE UP (ref 0–2)
BUN SERPL-MCNC: 36 MG/DL — HIGH (ref 7–23)
CALCIUM SERPL-MCNC: 9.1 MG/DL — SIGNIFICANT CHANGE UP (ref 8.4–10.5)
CHLORIDE SERPL-SCNC: 101 MMOL/L — SIGNIFICANT CHANGE UP (ref 96–108)
CO2 SERPL-SCNC: 23 MMOL/L — SIGNIFICANT CHANGE UP (ref 22–31)
CREAT SERPL-MCNC: 1.62 MG/DL — HIGH (ref 0.5–1.3)
EGFR: 33 ML/MIN/1.73M2 — LOW
EOSINOPHIL # BLD AUTO: 0.17 K/UL — SIGNIFICANT CHANGE UP (ref 0–0.5)
EOSINOPHIL NFR BLD AUTO: 2.3 % — SIGNIFICANT CHANGE UP (ref 0–6)
GLUCOSE BLDC GLUCOMTR-MCNC: 188 MG/DL — HIGH (ref 70–99)
GLUCOSE BLDC GLUCOMTR-MCNC: 194 MG/DL — HIGH (ref 70–99)
GLUCOSE BLDC GLUCOMTR-MCNC: 272 MG/DL — HIGH (ref 70–99)
GLUCOSE BLDC GLUCOMTR-MCNC: 317 MG/DL — HIGH (ref 70–99)
GLUCOSE SERPL-MCNC: 191 MG/DL — HIGH (ref 70–99)
HCT VFR BLD CALC: 43.8 % — SIGNIFICANT CHANGE UP (ref 34.5–45)
HGB BLD-MCNC: 13.8 G/DL — SIGNIFICANT CHANGE UP (ref 11.5–15.5)
IMM GRANULOCYTES NFR BLD AUTO: 0.8 % — SIGNIFICANT CHANGE UP (ref 0–0.9)
LYMPHOCYTES # BLD AUTO: 1.73 K/UL — SIGNIFICANT CHANGE UP (ref 1–3.3)
LYMPHOCYTES # BLD AUTO: 23.7 % — SIGNIFICANT CHANGE UP (ref 13–44)
MAGNESIUM SERPL-MCNC: 1.9 MG/DL — SIGNIFICANT CHANGE UP (ref 1.6–2.6)
MCHC RBC-ENTMCNC: 30.2 PG — SIGNIFICANT CHANGE UP (ref 27–34)
MCHC RBC-ENTMCNC: 31.5 GM/DL — LOW (ref 32–36)
MCV RBC AUTO: 95.8 FL — SIGNIFICANT CHANGE UP (ref 80–100)
MONOCYTES # BLD AUTO: 0.68 K/UL — SIGNIFICANT CHANGE UP (ref 0–0.9)
MONOCYTES NFR BLD AUTO: 9.3 % — SIGNIFICANT CHANGE UP (ref 2–14)
NEUTROPHILS # BLD AUTO: 4.62 K/UL — SIGNIFICANT CHANGE UP (ref 1.8–7.4)
NEUTROPHILS NFR BLD AUTO: 63.2 % — SIGNIFICANT CHANGE UP (ref 43–77)
NRBC # BLD: 0 /100 WBCS — SIGNIFICANT CHANGE UP (ref 0–0)
PHOSPHATE SERPL-MCNC: 3.1 MG/DL — SIGNIFICANT CHANGE UP (ref 2.5–4.5)
PLATELET # BLD AUTO: 208 K/UL — SIGNIFICANT CHANGE UP (ref 150–400)
POTASSIUM SERPL-MCNC: 4.7 MMOL/L — SIGNIFICANT CHANGE UP (ref 3.5–5.3)
POTASSIUM SERPL-SCNC: 4.7 MMOL/L — SIGNIFICANT CHANGE UP (ref 3.5–5.3)
RBC # BLD: 4.57 M/UL — SIGNIFICANT CHANGE UP (ref 3.8–5.2)
RBC # FLD: 13.2 % — SIGNIFICANT CHANGE UP (ref 10.3–14.5)
SODIUM SERPL-SCNC: 136 MMOL/L — SIGNIFICANT CHANGE UP (ref 135–145)
WBC # BLD: 7.31 K/UL — SIGNIFICANT CHANGE UP (ref 3.8–10.5)
WBC # FLD AUTO: 7.31 K/UL — SIGNIFICANT CHANGE UP (ref 3.8–10.5)

## 2023-07-12 PROCEDURE — 99231 SBSQ HOSP IP/OBS SF/LOW 25: CPT

## 2023-07-12 PROCEDURE — 99232 SBSQ HOSP IP/OBS MODERATE 35: CPT

## 2023-07-12 PROCEDURE — 99232 SBSQ HOSP IP/OBS MODERATE 35: CPT | Mod: GC

## 2023-07-12 RX ORDER — INSULIN GLARGINE 100 [IU]/ML
8 INJECTION, SOLUTION SUBCUTANEOUS AT BEDTIME
Refills: 0 | Status: DISCONTINUED | OUTPATIENT
Start: 2023-07-12 | End: 2023-07-13

## 2023-07-12 RX ORDER — INSULIN LISPRO 100/ML
2 VIAL (ML) SUBCUTANEOUS
Refills: 0 | Status: DISCONTINUED | OUTPATIENT
Start: 2023-07-12 | End: 2023-07-13

## 2023-07-12 RX ORDER — DEXAMETHASONE 0.5 MG/5ML
10 ELIXIR ORAL ONCE
Refills: 0 | Status: COMPLETED | OUTPATIENT
Start: 2023-07-12 | End: 2023-07-12

## 2023-07-12 RX ORDER — DEXAMETHASONE 0.5 MG/5ML
4 ELIXIR ORAL EVERY 6 HOURS
Refills: 0 | Status: COMPLETED | OUTPATIENT
Start: 2023-07-13 | End: 2023-07-16

## 2023-07-12 RX ADMIN — INSULIN GLARGINE 8 UNIT(S): 100 INJECTION, SOLUTION SUBCUTANEOUS at 22:29

## 2023-07-12 RX ADMIN — LOSARTAN POTASSIUM 100 MILLIGRAM(S): 100 TABLET, FILM COATED ORAL at 13:34

## 2023-07-12 RX ADMIN — Medication 1000 MILLIGRAM(S): at 06:58

## 2023-07-12 RX ADMIN — Medication 8: at 13:48

## 2023-07-12 RX ADMIN — Medication 100 MILLIGRAM(S): at 08:53

## 2023-07-12 RX ADMIN — Medication 1000 MILLIGRAM(S): at 18:20

## 2023-07-12 RX ADMIN — AMLODIPINE BESYLATE 10 MILLIGRAM(S): 2.5 TABLET ORAL at 06:58

## 2023-07-12 RX ADMIN — LIDOCAINE 1 PATCH: 4 CREAM TOPICAL at 19:27

## 2023-07-12 RX ADMIN — Medication 2: at 22:28

## 2023-07-12 RX ADMIN — Medication 650 MILLIGRAM(S): at 00:50

## 2023-07-12 RX ADMIN — Medication 102 MILLIGRAM(S): at 18:20

## 2023-07-12 RX ADMIN — Medication 200 MILLIGRAM(S): at 18:20

## 2023-07-12 RX ADMIN — LIDOCAINE 1 PATCH: 4 CREAM TOPICAL at 07:58

## 2023-07-12 RX ADMIN — Medication 200 MILLIGRAM(S): at 06:58

## 2023-07-12 RX ADMIN — Medication 2: at 17:54

## 2023-07-12 RX ADMIN — Medication 2: at 08:57

## 2023-07-12 NOTE — PROGRESS NOTE ADULT - ASSESSMENT
Ms. Minor is a 73 year old female with a PMHx of HTN, HLD, DM, Right hip replacement, RA, CKD (Cr baseline ~2) who presented to the  ED for unsteadiness and dizziness x1 week with last known normal a week ago. 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 Her daughter at bedside confirmed that she is at her baseline. Patient presented with no numbness or tingling, no headache, no N/V. CT Head was completed in the ED concerning for age indeterminate L cerebellar lacunar infarcts, with an addendum added noting patient was found to have area of vasogenic edema in the right frontoparietal region w/ associated sulcal effacement which could indicate underlying mass. She was admitted to stroke tele for further workup, started on DAPT and high dose statin. MR brain without contrast showed an 8mm cortically based nodule in the posterior-superior frontal lobe with extensive vasogenic edema c/f mets versus atypical infection. MRI w/ contrast was ordered to further evaluate potential mass. MR Angio brain and neck were unremarkable and did not show arterial steno-occlusive disease. Ms. Minor was admitted to stroke neurology for work up following which DAPT and statin was discontinued to decrease risk of ICH, patient's home RA meds (Plaquenil, Sulfasalazine) were restarted and neurosurgery was consulted who recommended CT chest, abdomen and pelvis to r/o malignancy. Ms. Minor was subsequently transferred from the stroke service to medicine for continued  malignancy workup. Ms. Minor is a 73 year old female with a PMHx of HTN, HLD, DM, Right hip replacement, RA, CKD (Cr baseline ~2) who presented to the  ED for unsteadiness and dizziness x1 week with last known normal a week ago. 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 Her daughter at bedside confirmed that she is at her baseline. Patient presented with no numbness or tingling, no headache, no N/V. CT Head was completed in the ED concerning for age indeterminate L cerebellar lacunar infarcts, with an addendum added noting patient was found to have area of vasogenic edema in the right frontoparietal region w/ associated sulcal effacement which could indicate underlying mass. She was admitted to stroke tele for further workup, started on DAPT and high dose statin. MR brain without contrast showed an 8mm cortically based nodule in the posterior-superior frontal lobe with extensive vasogenic edema c/f mets versus atypical infection. MRI w/ contrast was ordered to further evaluate potential mass. MR Angio brain and neck were unremarkable and did not show arterial steno-occlusive disease. Ms. Minor was admitted to stroke neurology for work up following which DAPT and statin was discontinued to decrease risk of ICH, patient's home RA meds (Plaquenil, Sulfasalazine) were restarted and neurosurgery was consulted who recommended CT chest, abdomen and pelvis to r/o malignancy. Ms. Minor was subsequently transferred from the stroke service to medicine for continued malignancy workup.

## 2023-07-12 NOTE — PROGRESS NOTE ADULT - PROBLEM SELECTOR PLAN 4
- A1C results: 7.4   - ISS. - A1C results: 7.4   - glargine 8 units basal bolus and lispro 2 units TID with meals

## 2023-07-12 NOTE — PROGRESS NOTE ADULT - SUBJECTIVE AND OBJECTIVE BOX
INTERVAL HPI/OVERNIGHT EVENTS:  Interim reviewed;  Lung lesion noted ; Spoke with pulmonary and will get biopsy on Friday  [facility] following  Arm improved;  Also wants to see Podiatry       MEDICATIONS  (STANDING):  amLODIPine   Tablet 10 milliGRAM(s) Oral every 24 hours  dextrose 5%. 1000 milliLiter(s) (50 mL/Hr) IV Continuous <Continuous>  dextrose 5%. 1000 milliLiter(s) (100 mL/Hr) IV Continuous <Continuous>  dextrose 50% Injectable 25 Gram(s) IV Push once  dextrose 50% Injectable 12.5 Gram(s) IV Push once  dextrose 50% Injectable 25 Gram(s) IV Push once  glucagon  Injectable 1 milliGRAM(s) IntraMuscular once  hydroxychloroquine 200 milliGRAM(s) Oral every 12 hours  ibuprofen  Tablet. 600 milliGRAM(s) Oral once  insulin lispro (ADMELOG) corrective regimen sliding scale   SubCutaneous three times a day before meals  insulin lispro (ADMELOG) corrective regimen sliding scale   SubCutaneous at bedtime  lidocaine   4% Patch 1 Patch Transdermal every 24 hours  losartan 100 milliGRAM(s) Oral every 24 hours  metoprolol succinate  milliGRAM(s) Oral every 24 hours  sulfaSALAzine 1000 milliGRAM(s) Oral every 12 hours    MEDICATIONS  (PRN):  acetaminophen     Tablet .. 650 milliGRAM(s) Oral every 6 hours PRN Temp greater or equal to 38.5C (101.3F), Mild Pain (1 - 3), Moderate Pain (4 - 6)  dextrose Oral Gel 15 Gram(s) Oral once PRN Blood Glucose LESS THAN 70 milliGRAM(s)/deciliter      Allergies    No Known Allergies    Intolerances        Vital Signs Last 24 Hrs  T(C): 36.4 (12 Jul 2023 06:57), Max: 36.7 (11 Jul 2023 12:35)  T(F): 97.5 (12 Jul 2023 06:57), Max: 98.1 (11 Jul 2023 12:35)  HR: 61 (12 Jul 2023 08:37) (60 - 73)  BP: 134/71 (12 Jul 2023 08:37) (102/65 - 134/71)  BP(mean): 94 (11 Jul 2023 20:30) (94 - 94)  RR: 18 (12 Jul 2023 06:57) (18 - 18)  SpO2: 100% (12 Jul 2023 06:57) (94% - 100%)    Parameters below as of 12 Jul 2023 06:57  Patient On (Oxygen Delivery Method): room air              Constitutional: Awake     Eyes: NEDRA    ENMT: Negative    Neck: Supple    Back:  no tenderness     Respiratory:  clear    Cardiovascular: S1 S2    Gastrointestinal: soft     Genitourinary:    Extremities:  no edema ; Increased ROM left arm    Vascular:    Neurological:    Skin:    Lymph Nodes:            07-11 @ 07:01  -  07-12 @ 07:00  --------------------------------------------------------  IN: 0 mL / OUT: 500 mL / NET: -500 mL      LABS:                        13.8   7.31  )-----------( 208      ( 12 Jul 2023 05:30 )             43.8     07-12    136  |  101  |  36<H>  ----------------------------<  191<H>  4.7   |  23  |  1.62<H>    Ca    9.1      12 Jul 2023 05:30  Phos  3.1     07-12  Mg     1.9     07-12        Urinalysis Basic - ( 12 Jul 2023 05:30 )    Color: x / Appearance: x / SG: x / pH: x  Gluc: 191 mg/dL / Ketone: x  / Bili: x / Urobili: x   Blood: x / Protein: x / Nitrite: x   Leuk Esterase: x / RBC: x / WBC x   Sq Epi: x / Non Sq Epi: x / Bacteria: x        RADIOLOGY & ADDITIONAL TESTS:

## 2023-07-12 NOTE — PROGRESS NOTE ADULT - SUBJECTIVE AND OBJECTIVE BOX
SUBJECTIVE    INTERVAL HPI/OVERNIGHT EVENTS:      MEDICATIONS  (STANDING):  amLODIPine   Tablet 10 milliGRAM(s) Oral every 24 hours  dextrose 5%. 1000 milliLiter(s) (100 mL/Hr) IV Continuous <Continuous>  dextrose 5%. 1000 milliLiter(s) (50 mL/Hr) IV Continuous <Continuous>  dextrose 50% Injectable 25 Gram(s) IV Push once  dextrose 50% Injectable 12.5 Gram(s) IV Push once  dextrose 50% Injectable 25 Gram(s) IV Push once  glucagon  Injectable 1 milliGRAM(s) IntraMuscular once  hydroxychloroquine 200 milliGRAM(s) Oral every 12 hours  ibuprofen  Tablet. 600 milliGRAM(s) Oral once  insulin lispro (ADMELOG) corrective regimen sliding scale   SubCutaneous three times a day before meals  insulin lispro (ADMELOG) corrective regimen sliding scale   SubCutaneous at bedtime  lidocaine   4% Patch 1 Patch Transdermal every 24 hours  losartan 100 milliGRAM(s) Oral every 24 hours  metoprolol succinate  milliGRAM(s) Oral every 24 hours  sulfaSALAzine 1000 milliGRAM(s) Oral every 12 hours    MEDICATIONS  (PRN):  acetaminophen     Tablet .. 650 milliGRAM(s) Oral every 6 hours PRN Temp greater or equal to 38.5C (101.3F), Mild Pain (1 - 3), Moderate Pain (4 - 6)  dextrose Oral Gel 15 Gram(s) Oral once PRN Blood Glucose LESS THAN 70 milliGRAM(s)/deciliter        Allergies    No Known Allergies    Intolerances        Vital Signs Last 24 Hrs  T(C): 36.4 (12 Jul 2023 06:57), Max: 36.8 (11 Jul 2023 09:13)  T(F): 97.5 (12 Jul 2023 06:57), Max: 98.2 (11 Jul 2023 09:13)  HR: 66 (12 Jul 2023 06:57) (60 - 75)  BP: 124/84 (12 Jul 2023 06:57) (102/65 - 125/79)  BP(mean): 94 (11 Jul 2023 20:30) (94 - 94)  RR: 18 (12 Jul 2023 06:57) (18 - 18)  SpO2: 100% (12 Jul 2023 06:57) (94% - 100%)    Parameters below as of 12 Jul 2023 06:57  Patient On (Oxygen Delivery Method): room air        PE    LABS:                INTERVAL HPI/OVERNIGHT EVENTS: no events overnight    SUBJECTIVE: Patient denies fever, headaches, CP, SOB, abdominal pain. Patient admits to pain in the left shoulder, weakness in the left arm and urinary incontinence 2/2 to uterine prolapse. Patient uses adult disposable underwear and follows with outpatient OBGYN.    MEDICATIONS  (STANDING):  amLODIPine   Tablet 10 milliGRAM(s) Oral every 24 hours  dextrose 5%. 1000 milliLiter(s) (100 mL/Hr) IV Continuous <Continuous>  dextrose 5%. 1000 milliLiter(s) (50 mL/Hr) IV Continuous <Continuous>  dextrose 50% Injectable 25 Gram(s) IV Push once  dextrose 50% Injectable 12.5 Gram(s) IV Push once  dextrose 50% Injectable 25 Gram(s) IV Push once  glucagon  Injectable 1 milliGRAM(s) IntraMuscular once  hydroxychloroquine 200 milliGRAM(s) Oral every 12 hours  ibuprofen  Tablet. 600 milliGRAM(s) Oral once  insulin lispro (ADMELOG) corrective regimen sliding scale   SubCutaneous three times a day before meals  insulin lispro (ADMELOG) corrective regimen sliding scale   SubCutaneous at bedtime  lidocaine   4% Patch 1 Patch Transdermal every 24 hours  losartan 100 milliGRAM(s) Oral every 24 hours  metoprolol succinate  milliGRAM(s) Oral every 24 hours  sulfaSALAzine 1000 milliGRAM(s) Oral every 12 hours    MEDICATIONS  (PRN):  acetaminophen     Tablet .. 650 milliGRAM(s) Oral every 6 hours PRN Temp greater or equal to 38.5C (101.3F), Mild Pain (1 - 3), Moderate Pain (4 - 6)  dextrose Oral Gel 15 Gram(s) Oral once PRN Blood Glucose LESS THAN 70 milliGRAM(s)/deciliter      Allergies    No Known Allergies    Intolerances        Vital Signs Last 24 Hrs  T(C): 36.4 (12 Jul 2023 06:57), Max: 36.8 (11 Jul 2023 09:13)  T(F): 97.5 (12 Jul 2023 06:57), Max: 98.2 (11 Jul 2023 09:13)  HR: 66 (12 Jul 2023 06:57) (60 - 75)  BP: 124/84 (12 Jul 2023 06:57) (102/65 - 125/79)  BP(mean): 94 (11 Jul 2023 20:30) (94 - 94)  RR: 18 (12 Jul 2023 06:57) (18 - 18)  SpO2: 100% (12 Jul 2023 06:57) (94% - 100%)    Parameters below as of 12 Jul 2023 06:57  Patient On (Oxygen Delivery Method): room air    PE:  GENERAL: NAD, lying in bed comfortably  HEAD:  Atraumatic, normocephalic  EYES: EOMI, PERRLA, conjunctiva and sclera clear  NECK: Supple, trachea midline  HEART: Regular rate and rhythm, no murmurs, rubs, or gallops  LUNGS: Unlabored respirations.  Clear to auscultation bilaterally, no crackles, wheezing, or rhonchi  ABDOMEN: Soft, nontender, nondistended  EXTREMITIES: 2+ peripheral pulses bilaterally. No clubbing, cyanosis, or edema  NERVOUS SYSTEM:  A&Ox3, moving all extremities, weakness and pain in the left upper extremity. Left lower extremity weaker than right.  SKIN: No rashes or lesions    LABS:                         13.8   7.31  )-----------( 208      ( 12 Jul 2023 05:30 )             43.8     07-12    136  |  101  |  36<H>  ----------------------------<  191<H>  4.7   |  23  |  1.62<H>    Ca    9.1      12 Jul 2023 05:30  Phos  3.1     07-12  Mg     1.9     07-12        Urinalysis Basic - ( 12 Jul 2023 05:30 )    Color: x / Appearance: x / SG: x / pH: x  Gluc: 191 mg/dL / Ketone: x  / Bili: x / Urobili: x   Blood: x / Protein: x / Nitrite: x   Leuk Esterase: x / RBC: x / WBC x   Sq Epi: x / Non Sq Epi: x / Bacteria: x                RADIOLOGY, EKG & ADDITIONAL TESTS: Reviewed.                 INTERVAL HPI/OVERNIGHT EVENTS: no events overnight    SUBJECTIVE: Patient denies fever, headaches, CP, SOB, abdominal pain. Patient admits to pain in the left shoulder, weakness in the left arm and urinary incontinence 2/2 to uterine prolapse. Patient uses adult disposable underwear and follows with outpatient OBGYN.    MEDICATIONS  (STANDING):  amLODIPine   Tablet 10 milliGRAM(s) Oral every 24 hours  dexAMETHasone  IVPB 10 milliGRAM(s) IV Intermittent once  dextrose 5%. 1000 milliLiter(s) (100 mL/Hr) IV Continuous <Continuous>  dextrose 5%. 1000 milliLiter(s) (50 mL/Hr) IV Continuous <Continuous>  dextrose 50% Injectable 25 Gram(s) IV Push once  dextrose 50% Injectable 12.5 Gram(s) IV Push once  dextrose 50% Injectable 25 Gram(s) IV Push once  glucagon  Injectable 1 milliGRAM(s) IntraMuscular once  hydroxychloroquine 200 milliGRAM(s) Oral every 12 hours  ibuprofen  Tablet. 600 milliGRAM(s) Oral once  insulin lispro (ADMELOG) corrective regimen sliding scale   SubCutaneous three times a day before meals  insulin lispro (ADMELOG) corrective regimen sliding scale   SubCutaneous at bedtime  lidocaine   4% Patch 1 Patch Transdermal every 24 hours  losartan 100 milliGRAM(s) Oral every 24 hours  metoprolol succinate  milliGRAM(s) Oral every 24 hours  sulfaSALAzine 1000 milliGRAM(s) Oral every 12 hours    MEDICATIONS  (PRN):  acetaminophen     Tablet .. 650 milliGRAM(s) Oral every 6 hours PRN Temp greater or equal to 38.5C (101.3F), Mild Pain (1 - 3), Moderate Pain (4 - 6)  dextrose Oral Gel 15 Gram(s) Oral once PRN Blood Glucose LESS THAN 70 milliGRAM(s)/deciliter        Allergies    No Known Allergies    Intolerances        Vital Signs Last 24 Hrs  T(C): 36.4 (12 Jul 2023 06:57), Max: 36.8 (11 Jul 2023 09:13)  T(F): 97.5 (12 Jul 2023 06:57), Max: 98.2 (11 Jul 2023 09:13)  HR: 66 (12 Jul 2023 06:57) (60 - 75)  BP: 124/84 (12 Jul 2023 06:57) (102/65 - 125/79)  BP(mean): 94 (11 Jul 2023 20:30) (94 - 94)  RR: 18 (12 Jul 2023 06:57) (18 - 18)  SpO2: 100% (12 Jul 2023 06:57) (94% - 100%)    Parameters below as of 12 Jul 2023 06:57  Patient On (Oxygen Delivery Method): room air    PE:  GENERAL: NAD, lying in bed comfortably  HEAD:  Atraumatic, normocephalic  EYES: EOMI, PERRLA, conjunctiva and sclera clear  NECK: Supple, trachea midline  HEART: Regular rate and rhythm, no murmurs, rubs, or gallops  LUNGS: Unlabored respirations.  Clear to auscultation bilaterally, no crackles, wheezing, or rhonchi  ABDOMEN: Soft, nontender, nondistended  EXTREMITIES: 2+ peripheral pulses bilaterally. No clubbing, cyanosis, or edema  NERVOUS SYSTEM:  A&Ox3, moving all extremities, weakness and pain in the left upper extremity. Left lower extremity weaker than right.  SKIN: No rashes or lesions    LABS:                         13.8   7.31  )-----------( 208      ( 12 Jul 2023 05:30 )             43.8     07-12    136  |  101  |  36<H>  ----------------------------<  191<H>  4.7   |  23  |  1.62<H>    Ca    9.1      12 Jul 2023 05:30  Phos  3.1     07-12  Mg     1.9     07-12        Urinalysis Basic - ( 12 Jul 2023 05:30 )    Color: x / Appearance: x / SG: x / pH: x  Gluc: 191 mg/dL / Ketone: x  / Bili: x / Urobili: x   Blood: x / Protein: x / Nitrite: x   Leuk Esterase: x / RBC: x / WBC x   Sq Epi: x / Non Sq Epi: x / Bacteria: x                RADIOLOGY, EKG & ADDITIONAL TESTS: Reviewed.

## 2023-07-12 NOTE — PROGRESS NOTE ADULT - SUBJECTIVE AND OBJECTIVE BOX
INTERVAL HPI/OVERNIGHT EVENTS:    SUBJECTIVE: Patient seen and examined at bedside.    VITAL SIGNS:  ICU Vital Signs Last 24 Hrs  T(C): 36.4 (12 Jul 2023 11:37), Max: 36.7 (11 Jul 2023 20:30)  T(F): 97.6 (12 Jul 2023 11:37), Max: 98 (11 Jul 2023 20:30)  HR: 65 (12 Jul 2023 11:37) (61 - 73)  BP: 118/75 (12 Jul 2023 11:37) (117/76 - 134/71)  BP(mean): 94 (11 Jul 2023 20:30) (94 - 94)  ABP: --  ABP(mean): --  RR: 18 (12 Jul 2023 11:37) (18 - 18)  SpO2: 94% (12 Jul 2023 11:37) (94% - 100%)    O2 Parameters below as of 12 Jul 2023 11:37  Patient On (Oxygen Delivery Method): room air              07-11 @ 07:01  -  07-12 @ 07:00  --------------------------------------------------------  IN: 0 mL / OUT: 500 mL / NET: -500 mL      CAPILLARY BLOOD GLUCOSE      POCT Blood Glucose.: 188 mg/dL (12 Jul 2023 08:50)      PHYSICAL EXAM:    Constitutional: NAD  HEENT: NC/AT; PERRL, anicteric sclera; MMM  Neck: supple, no JVD  Cardiovascular: +S1/S2, RRR  Respiratory: CTA B/L, no W/R/R  Gastrointestinal: abdomen soft, NT/ND; no rebound or guarding; +BSx4  Genitourinary: no suprapubic tenderness or fullness  Extremities: WWP; no LE edema; no clubbing or cyanosis  Vascular: 2+ radial, DP/PT and femoral pulses B/L  Dermatologic: normal color and turgor; no visible rashes  Neurological: AAOx3; L sided weakness    MEDICATIONS:  MEDICATIONS  (STANDING):  amLODIPine   Tablet 10 milliGRAM(s) Oral every 24 hours  dextrose 5%. 1000 milliLiter(s) (50 mL/Hr) IV Continuous <Continuous>  dextrose 5%. 1000 milliLiter(s) (100 mL/Hr) IV Continuous <Continuous>  dextrose 50% Injectable 25 Gram(s) IV Push once  dextrose 50% Injectable 12.5 Gram(s) IV Push once  dextrose 50% Injectable 25 Gram(s) IV Push once  glucagon  Injectable 1 milliGRAM(s) IntraMuscular once  hydroxychloroquine 200 milliGRAM(s) Oral every 12 hours  ibuprofen  Tablet. 600 milliGRAM(s) Oral once  insulin lispro (ADMELOG) corrective regimen sliding scale   SubCutaneous three times a day before meals  insulin lispro (ADMELOG) corrective regimen sliding scale   SubCutaneous at bedtime  lidocaine   4% Patch 1 Patch Transdermal every 24 hours  losartan 100 milliGRAM(s) Oral every 24 hours  metoprolol succinate  milliGRAM(s) Oral every 24 hours  sulfaSALAzine 1000 milliGRAM(s) Oral every 12 hours    MEDICATIONS  (PRN):  acetaminophen     Tablet .. 650 milliGRAM(s) Oral every 6 hours PRN Temp greater or equal to 38.5C (101.3F), Mild Pain (1 - 3), Moderate Pain (4 - 6)  dextrose Oral Gel 15 Gram(s) Oral once PRN Blood Glucose LESS THAN 70 milliGRAM(s)/deciliter      ALLERGIES:  Allergies    No Known Allergies    Intolerances        LABS:                        13.8   7.31  )-----------( 208      ( 12 Jul 2023 05:30 )             43.8     07-12    136  |  101  |  36<H>  ----------------------------<  191<H>  4.7   |  23  |  1.62<H>    Ca    9.1      12 Jul 2023 05:30  Phos  3.1     07-12  Mg     1.9     07-12        Urinalysis Basic - ( 12 Jul 2023 05:30 )    Color: x / Appearance: x / SG: x / pH: x  Gluc: 191 mg/dL / Ketone: x  / Bili: x / Urobili: x   Blood: x / Protein: x / Nitrite: x   Leuk Esterase: x / RBC: x / WBC x   Sq Epi: x / Non Sq Epi: x / Bacteria: x        RADIOLOGY & ADDITIONAL TESTS: Reviewed.    CT Head 7.9.23  IMPRESSION:  No acute intracranial hemorrhage, acute demarcated territorial   infarction, or masslike lesion. Age indeterminant left cerebellar and   left internal capsule lacunar infarcts.    MR Head wo contrast 7.9.23  IMPRESSION:    An 8 mm cortically based nodule in the posterior-superior frontal lobe   (motor strip) is present with extensive vasogenic edema. Metastasis or   focus of atypical infection are leading differential considerations. The   patient should return for contrast-enhanced MR imaging (unless   contraindicated) to assess for multiplicity.    No recent infarct. Chronic infarcts and white matter microangiopathic   change as above.    CT Chest/Abdomen/Pelvis 7.11.23  FINDINGS:  CHEST:  LUNGS AND LARGE AIRWAYS: Patent central airways. A somewhat spiculated,   proximate 2.6 x 4.1 cm mass is seen in the posterior aspect of the left   upper lobe, abutting the major fissure. The mass extends into the   adjacent left hilum and extends laterally to the left lateral pleura.   More nonspecific parenchymal density is seen in the anterior aspect of   the left lower lobe abutting the major fissure.  PLEURA: Trace left pleural effusion  VESSELS: Within normal limits.  HEART: Mild cardiomegaly. No pericardial effusion.  MEDIASTINUM AND CODY: Scattered, nonpathologically enlarged AP window   lymph and leftsuprahilar nodes are seen. However, given the   above-described left upper lobe mass, cannot exclude local cyndi   involvement.  CHEST WALL AND LOWER NECK: Within normal limits.    ABDOMEN AND PELVIS:  LIVER: Indeterminate, proximate 1.5 cm low-attenuation lesion is seen in   the left lobe of the liver.  BILE DUCTS: Normal caliber.  GALLBLADDER: Cholelithiasis. No gallbladder wall thickening  SPLEEN: Within normal limits.  PANCREAS: 1.2 cm pancreatic body cystic lesion. No pancreatic ductal   dilatation.  ADRENALS: Within normal limits.  KIDNEYS/URETERS: Left upper pole simple renal cyst measuring 4.8 cm.   Scattered other subcentimeter bilateral cortical low-attenuation lesions   too small to characterize, but are likely cysts. No renal collecting   system obstruction bilaterally.    BLADDER: Within normal limits.  REPRODUCTIVE ORGANS: Multiple calcified uterine fibroids. The dominant   lesion is seen in the left anterior uterus, measuring approximately 9.2   cm. No adnexal masses.    BOWEL: No bowel obstruction. Appendix is normal.  PERITONEUM: No ascites.  VESSELS: No abdominal aortic aneurysm. Multifocal atherosclerotic   vascular calcifications seen in the iliac arteries and common femoral   arteries bilaterally  RETROPERITONEUM/LYMPH NODES: No lymphadenopathy.  ABDOMINAL WALL: Within normal limits.  BONES: Partially visualized left shoulder joint effusion with   degenerative changes. No suspicious osseous lesions    IMPRESSION:  1.  Left upper lobe somewhat spiculated mass, suspicious for neoplasm, as   described.  2.  More nonspecific left lower lobe airspace opacities; cannot exclude   mass lesion versus infectious etiology.  3.  Indeterminate low-attenuation lesion seen in left lobe of liver;   cannot exclude metastasis. If clinically indicated MRI may be helpful for   further evaluation.  4.  1.2 cm pancreas body cystic lesion; possible IPMN (intraductal   papillary mucinous neoplasm). MRI may be helpful for this lesion also.  5.  Left shoulder joint effusion with degenerative changes.

## 2023-07-12 NOTE — PROGRESS NOTE ADULT - SUBJECTIVE AND OBJECTIVE BOX
HPI:   **STROKE HPI***    HPI: 73y Female with PMHx of HTN HLD, DM, R hip replacement, RA, CKD (Cr baseline ~2) presents to the  ED for unsteadiness and dizziness x1 week. LKN was a week ago. mRS of 2, walks with a rolling walker but is able to care for her basic everyday needs. NIH of 5 for L facial and dysarthria and LUE weakness. 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 Daughter at bedside states that she is at her baseline. LUE is pain limited to her rheumatoid arthritis but patient states that she definitely has noticed it to be slightly weaker than usual. Otherwise no numbness or tingling, no headache, N/V. CTH was completed in the ED concerning for age indeterminate L cerebellar lacunar infarcts.       (09 Jul 2023 03:04)    Vital Signs Last 24 Hrs  T(C): 36.4 (12 Jul 2023 06:57), Max: 36.8 (11 Jul 2023 09:13)  T(F): 97.5 (12 Jul 2023 06:57), Max: 98.2 (11 Jul 2023 09:13)  HR: 66 (12 Jul 2023 06:57) (60 - 75)  BP: 124/84 (12 Jul 2023 06:57) (102/65 - 125/79)  BP(mean): 94 (11 Jul 2023 20:30) (94 - 94)  RR: 18 (12 Jul 2023 06:57) (18 - 18)  SpO2: 100% (12 Jul 2023 06:57) (94% - 100%)    Parameters below as of 12 Jul 2023 06:57  Patient On (Oxygen Delivery Method): room air        I&O's Summary    11 Jul 2023 07:01  -  12 Jul 2023 07:00  --------------------------------------------------------  IN: 0 mL / OUT: 500 mL / NET: -500 mL        PHYSICAL EXAM:  Constitutional:  74 y/o female awake, alert in no acute distress.  Eyes:  Sclera anicteric, conjunctiva noninjected.  ENMT: Oropharyngeal mucosa moist, pink. Tongue midline.    Neck: Neck supple, FROM.  No appreciable lymphadenopathy.  Back:  No pain to palpation/percussion of low back. No CVA tenderness.  Respiratory: Clear to auscultation bilaterally.  No rales, rhonchi, wheezes.  Cardiovascular: Regular rate and rhythm.  S1, S2 heard.  Gastrointestinal:  Soft, nontender, nondistended.  +BS.  Genitourinary:  Deferred.  Rectal: Deferred.  Vascular: Extremities warm, no ulcers, no discoloration of skin.   Neurological: Gen: AA&O x 3, conversant, appropriate.      CN II-XII +left facial droop, pupils 3mm briskly reactive b/l, EOMI, grossly intact.    Motor: DIAS x 4, 5/5 RU and RLE, LUE wiggling fingers, L HG 3/5, L biceps/triceps 2/5, LLE 5/5 HF/KE/KF, DFPF 4/5     Sens: Sensation intact to light touch throughout.    DTRs: 2+ symmetric throughout.    Hoffmans negative bilaterally.  Plantar downgoing bilaterally.  No clonus.      No pronator drift, no dysmetria.  Skin: Warm, dry, no erythema.      LABS:                  CAPILLARY BLOOD GLUCOSE      POCT Blood Glucose.: 266 mg/dL (11 Jul 2023 23:39)  POCT Blood Glucose.: 188 mg/dL (11 Jul 2023 18:18)  POCT Blood Glucose.: 247 mg/dL (11 Jul 2023 13:08)  POCT Blood Glucose.: 179 mg/dL (11 Jul 2023 09:27)      Drug Levels: [] N/A    CSF Analysis: [] N/A      Allergies    No Known Allergies    Intolerances      MEDICATIONS:  Antibiotics:  hydroxychloroquine 200 milliGRAM(s) Oral every 12 hours    Neuro:  acetaminophen     Tablet .. 650 milliGRAM(s) Oral every 6 hours PRN  ibuprofen  Tablet. 600 milliGRAM(s) Oral once    Anticoagulation:    OTHER:  amLODIPine   Tablet 10 milliGRAM(s) Oral every 24 hours  dextrose 50% Injectable 25 Gram(s) IV Push once  dextrose 50% Injectable 12.5 Gram(s) IV Push once  dextrose 50% Injectable 25 Gram(s) IV Push once  dextrose Oral Gel 15 Gram(s) Oral once PRN  glucagon  Injectable 1 milliGRAM(s) IntraMuscular once  insulin lispro (ADMELOG) corrective regimen sliding scale   SubCutaneous at bedtime  insulin lispro (ADMELOG) corrective regimen sliding scale   SubCutaneous three times a day before meals  lidocaine   4% Patch 1 Patch Transdermal every 24 hours  losartan 100 milliGRAM(s) Oral every 24 hours  metoprolol succinate  milliGRAM(s) Oral every 24 hours  sulfaSALAzine 1000 milliGRAM(s) Oral every 12 hours    IVF:  dextrose 5%. 1000 milliLiter(s) IV Continuous <Continuous>  dextrose 5%. 1000 milliLiter(s) IV Continuous <Continuous>    CULTURES:    RADIOLOGY & ADDITIONAL TESTS:  CT CAP 7/12/23:  1.  Left upper lobe somewhat spiculated mass, suspicious for neoplasm, as   described.  2.  More nonspecific left lower lobe airspace opacities; cannot exclude   mass lesion versus infectious etiology.  3.  Indeterminate low-attenuation lesion seen in left lobe of liver;   cannot exclude metastasis. If clinically indicated MRI may be helpful for   further evaluation.  4.  1.2 cm pancreas body cystic lesion; possible IPMN (intraductal   papillary mucinous neoplasm). MRI may be helpful for this lesion also.  5.  Left shoulder joint effusion with degenerative changes.      ASSESSMENT:  73y Female with PMHx of HTN HLD, DM, R hip replacement, RA, CKD (Cr baseline ~2) presents to the  ED for unsteadiness and dizziness x1 week. LKN was a week ago. mRS of 2, walks with a rolling walker but is able to care for her basic everyday needs. NIH of 5 for L facial and dysarthria and LUE weakness. 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 Daughter at bedside states that she is at her baseline. LUE is pain limited to her rheumatoid arthritis but patient states that she definitely has noticed it to be slightly weaker than usual. Otherwise no numbness or tingling, no headache, N/V. CTH was completed in the ED concerning for age indeterminate L cerebellar lacunar infarcts. MR brain 8 mm cortically based nodule in the posterior-superior frontal lobe with extensive vasogenic edema, metastasis vs focus of atypical infection. CT C/A/P concerning for primary lung malignancy    PLAN:   - Recommend decadron 10mg IV x1, then taper PO 4q6 to 2mg bid over 1 week, keep on 2mg bid on discharge  - Recommend heme/onc consult with Dr Delaney   - Recommend rad/onc consult with Dr. Wernicke   - Recommend lung biopsy   - Follow up with Dr. D'Amico outpatient for SRS planning (office #[phone])   - No neurosurgical intervention warranted at this time, will sign off    Discussed with Dr. D'Amico

## 2023-07-12 NOTE — PROGRESS NOTE ADULT - PROBLEM SELECTOR PLAN 2
- c BP meds, amlodipine 10mg, losartan 100mg, Toprol 100mg , uptitrate if needed  - TTE: moderate LVH, grossly unremarkable  - Stroke Code EKG Results: NSR.

## 2023-07-12 NOTE — PROGRESS NOTE ADULT - TIME BILLING
Patient seen and examined;  Patient aware of lung lesion and plans for bronchoscopic  biopsy on Friday  Please obtain Podiatry consult; Pain anterior aspect right foot  Will discuss with patients daughter

## 2023-07-12 NOTE — PROGRESS NOTE ADULT - ASSESSMENT
73F with PMHx of HTN HLD, DM, R hip replacement, RA, CKD (Cr baseline ~2) presents to the  ED for unsteadiness and dizziness x1 week, underwent stroke workup with imaging findings negative for acute CVA however with incidental finding of brain mass suspicious for metastasis. Oncology consulted for malignancy workup.    #brain mass suspicious for malignancy, suspect lung primary (T2b M1)  this patient was found to have an 8mm cortically based nodule in the posterior-superior frontal lobe suspicious for metastasis. The most common primary cancers that metastasize to brain include carcinomas (lung, breast, renal, colorectal) and melanoma, however tissue confirmation is necessary. A primary brain lesion or lymphoma are also on the differential.  -CT Chest, Abdomen, Pelvis is showing a EM spiculated mass measuring 2.6 x 4.1 cm suspicious for a primary lung malignancy, but we would need biopsy pathology to confirm  -There is an indeterminate low-attenuation lesion in the L lobe of the liver(met?), and a 1.2 cm pancreas body cystic lesion (IPMN?) that warrant MRI evaluation - if the lesion on the liver is the met, it might be a good biopsy candidate  -Will send [facility] 360  -further treatment planning contingent on biopsy pathology    To be discussed with Dr. Tate 73F with PMHx of HTN HLD, DM, R hip replacement, RA, CKD (Cr baseline ~2) presents to the  ED for unsteadiness and dizziness x1 week, underwent stroke workup with imaging findings negative for acute CVA however with incidental finding of brain mass suspicious for metastasis. Oncology consulted for malignancy workup.    #brain mass suspicious for malignancy, suspect lung primary (T2b M1)  this patient was found to have an 8mm cortically based nodule in the posterior-superior frontal lobe suspicious for metastasis. The most common primary cancers that metastasize to brain include carcinomas (lung, breast, renal, colorectal) and melanoma, however tissue confirmation is necessary. A primary brain lesion or lymphoma are also on the differential.  -CT Chest, Abdomen, Pelvis is showing a EM spiculated mass measuring 2.6 x 4.1 cm suspicious for a primary lung malignancy, but we would need biopsy pathology to confirm. Please get Pulm on board to evaluate for biopsy.  -There is an indeterminate low-attenuation lesion in the L lobe of the liver(met?), and a 1.2 cm pancreas body cystic lesion (IPMN?) that warrant MRI evaluation - if the lesion on the liver is the met, it might be a good biopsy candidate  -Will send [facility]ant 360  -further treatment planning contingent on biopsy pathology    seen and discussed with Dr. Tate

## 2023-07-12 NOTE — PROGRESS NOTE ADULT - PROBLEM SELECTOR PLAN 1
Workup for falls with potential brain mass etiology.   - ASA, Plavix, and Atorvastatin discontinued to prevent ICH  - q6hr neuro checks and vitals  - MRA H/N without large vessel occlusion or HGS   - MRI w/o contrast: An 8 mm cortically based nodule in the posterior-superior frontal lobe (motor strip) is present with extensive vasogenic edema. Metastasis or focus of atypical infection are leading differential considerations  - neurosurgery consulted for possible mass   - pending MRI w/ contrast w/ [facility]  - pending CT chest/abdomen/pelvis to r/o malignancy  - if altered/concerning, consider seizure given vasogenic edema  - eventual outpatient neurology follow up    - Stroke Code HCT Results: age indeterminate L cerebellar lacunar infarcts  - Stroke Code CTA Results: deferred due to CKD  - Stroke education. Workup for falls with potential brain mass etiology.   - MRA H/N without large vessel occlusion or HGS   - MRI w/o contrast: An 8 mm cortically based nodule in the posterior-superior frontal lobe (motor strip) is present with extensive vasogenic edema. Metastasis or focus of atypical infection are leading differential considerations  - ASA, Plavix, and Atorvastatin discontinued to prevent ICH  - q6hr neuro checks and vitals  - neurosurgery consulted for possible mass   - pending MRI w/ contrast w/ [facility]  - pending CT chest/abdomen/pelvis to r/o malignancy  - if altered/concerning, consider seizure given vasogenic edema  - eventual outpatient neurology follow up    - Stroke Code HCT Results: age indeterminate L cerebellar lacunar infarcts  - Stroke Code CTA Results: deferred due to CKD  - Stroke education. Workup for falls with potential brain mass etiology.   - MRA H/N without large vessel occlusion or HGS   - MRI w/o contrast: An 8 mm cortically based nodule in the posterior-superior frontal lobe (motor strip) is present with extensive vasogenic edema. Metastasis or focus of atypical infection are leading differential considerations  - ASA, Plavix, and Atorvastatin discontinued to prevent ICH  - q6hr neuro checks and vitals  - neurosurgery recs appreciated: decadron 10 mg IV once, decadron 4 mg q6h taper to 2 mg BID over one week, discharge on 2 mg BID  - pending MRI w/ contrast w/ [place]  - pending CT chest/abdomen/pelvis to r/o malignancy  - if altered/concerning, consider seizure given vasogenic edema  - eventual outpatient neurology follow up    - Stroke Code HCT Results: age indeterminate L cerebellar lacunar infarcts  - Stroke Code CTA Results: deferred due to CKD  - Stroke education.

## 2023-07-13 LAB
ALBUMIN SERPL ELPH-MCNC: 3.5 G/DL — SIGNIFICANT CHANGE UP (ref 3.3–5)
ALP SERPL-CCNC: 196 U/L — HIGH (ref 40–120)
ALT FLD-CCNC: 12 U/L — SIGNIFICANT CHANGE UP (ref 10–45)
ANION GAP SERPL CALC-SCNC: 10 MMOL/L — SIGNIFICANT CHANGE UP (ref 5–17)
AST SERPL-CCNC: 20 U/L — SIGNIFICANT CHANGE UP (ref 10–40)
BILIRUB SERPL-MCNC: 0.2 MG/DL — SIGNIFICANT CHANGE UP (ref 0.2–1.2)
BUN SERPL-MCNC: 45 MG/DL — HIGH (ref 7–23)
CALCIUM SERPL-MCNC: 9.4 MG/DL — SIGNIFICANT CHANGE UP (ref 8.4–10.5)
CHLORIDE SERPL-SCNC: 102 MMOL/L — SIGNIFICANT CHANGE UP (ref 96–108)
CO2 SERPL-SCNC: 24 MMOL/L — SIGNIFICANT CHANGE UP (ref 22–31)
CREAT SERPL-MCNC: 1.56 MG/DL — HIGH (ref 0.5–1.3)
EGFR: 35 ML/MIN/1.73M2 — LOW
GLUCOSE BLDC GLUCOMTR-MCNC: 320 MG/DL — HIGH (ref 70–99)
GLUCOSE BLDC GLUCOMTR-MCNC: 397 MG/DL — HIGH (ref 70–99)
GLUCOSE BLDC GLUCOMTR-MCNC: 402 MG/DL — HIGH (ref 70–99)
GLUCOSE BLDC GLUCOMTR-MCNC: 414 MG/DL — HIGH (ref 70–99)
GLUCOSE BLDC GLUCOMTR-MCNC: 421 MG/DL — HIGH (ref 70–99)
GLUCOSE BLDC GLUCOMTR-MCNC: 447 MG/DL — HIGH (ref 70–99)
GLUCOSE BLDC GLUCOMTR-MCNC: 467 MG/DL — CRITICAL HIGH (ref 70–99)
GLUCOSE SERPL-MCNC: 390 MG/DL — HIGH (ref 70–99)
HCT VFR BLD CALC: 39.3 % — SIGNIFICANT CHANGE UP (ref 34.5–45)
HGB BLD-MCNC: 12.7 G/DL — SIGNIFICANT CHANGE UP (ref 11.5–15.5)
MAGNESIUM SERPL-MCNC: 1.9 MG/DL — SIGNIFICANT CHANGE UP (ref 1.6–2.6)
MCHC RBC-ENTMCNC: 30.5 PG — SIGNIFICANT CHANGE UP (ref 27–34)
MCHC RBC-ENTMCNC: 32.3 GM/DL — SIGNIFICANT CHANGE UP (ref 32–36)
MCV RBC AUTO: 94.2 FL — SIGNIFICANT CHANGE UP (ref 80–100)
NRBC # BLD: 0 /100 WBCS — SIGNIFICANT CHANGE UP (ref 0–0)
PHOSPHATE SERPL-MCNC: 3.2 MG/DL — SIGNIFICANT CHANGE UP (ref 2.5–4.5)
PLATELET # BLD AUTO: 213 K/UL — SIGNIFICANT CHANGE UP (ref 150–400)
POTASSIUM SERPL-MCNC: 4.9 MMOL/L — SIGNIFICANT CHANGE UP (ref 3.5–5.3)
POTASSIUM SERPL-SCNC: 4.9 MMOL/L — SIGNIFICANT CHANGE UP (ref 3.5–5.3)
PROT SERPL-MCNC: 7.5 G/DL — SIGNIFICANT CHANGE UP (ref 6–8.3)
RBC # BLD: 4.17 M/UL — SIGNIFICANT CHANGE UP (ref 3.8–5.2)
RBC # FLD: 13.2 % — SIGNIFICANT CHANGE UP (ref 10.3–14.5)
SARS-COV-2 RNA SPEC QL NAA+PROBE: SIGNIFICANT CHANGE UP
SODIUM SERPL-SCNC: 136 MMOL/L — SIGNIFICANT CHANGE UP (ref 135–145)
WBC # BLD: 11.64 K/UL — HIGH (ref 3.8–10.5)
WBC # FLD AUTO: 11.64 K/UL — HIGH (ref 3.8–10.5)

## 2023-07-13 PROCEDURE — 99221 1ST HOSP IP/OBS SF/LOW 40: CPT

## 2023-07-13 PROCEDURE — 99232 SBSQ HOSP IP/OBS MODERATE 35: CPT | Mod: GC

## 2023-07-13 RX ORDER — INSULIN LISPRO 100/ML
VIAL (ML) SUBCUTANEOUS AT BEDTIME
Refills: 0 | Status: DISCONTINUED | OUTPATIENT
Start: 2023-07-13 | End: 2023-07-14

## 2023-07-13 RX ORDER — DEXTROSE 50 % IN WATER 50 %
15 SYRINGE (ML) INTRAVENOUS ONCE
Refills: 0 | Status: DISCONTINUED | OUTPATIENT
Start: 2023-07-13 | End: 2023-07-18

## 2023-07-13 RX ORDER — INSULIN GLARGINE 100 [IU]/ML
20 INJECTION, SOLUTION SUBCUTANEOUS AT BEDTIME
Refills: 0 | Status: DISCONTINUED | OUTPATIENT
Start: 2023-07-13 | End: 2023-07-14

## 2023-07-13 RX ORDER — INSULIN LISPRO 100/ML
6 VIAL (ML) SUBCUTANEOUS
Refills: 0 | Status: DISCONTINUED | OUTPATIENT
Start: 2023-07-13 | End: 2023-07-14

## 2023-07-13 RX ADMIN — Medication 1000 MILLIGRAM(S): at 18:07

## 2023-07-13 RX ADMIN — Medication 200 MILLIGRAM(S): at 07:47

## 2023-07-13 RX ADMIN — Medication 8: at 23:46

## 2023-07-13 RX ADMIN — LIDOCAINE 1 PATCH: 4 CREAM TOPICAL at 16:16

## 2023-07-13 RX ADMIN — LIDOCAINE 1 PATCH: 4 CREAM TOPICAL at 16:44

## 2023-07-13 RX ADMIN — Medication 8: at 08:57

## 2023-07-13 RX ADMIN — INSULIN GLARGINE 20 UNIT(S): 100 INJECTION, SOLUTION SUBCUTANEOUS at 22:22

## 2023-07-13 RX ADMIN — Medication 10: at 13:19

## 2023-07-13 RX ADMIN — LIDOCAINE 1 PATCH: 4 CREAM TOPICAL at 07:13

## 2023-07-13 RX ADMIN — Medication 100 MILLIGRAM(S): at 08:52

## 2023-07-13 RX ADMIN — Medication 4 MILLIGRAM(S): at 07:47

## 2023-07-13 RX ADMIN — Medication 2 UNIT(S): at 13:20

## 2023-07-13 RX ADMIN — Medication 1000 MILLIGRAM(S): at 07:50

## 2023-07-13 RX ADMIN — Medication 4 MILLIGRAM(S): at 13:07

## 2023-07-13 RX ADMIN — Medication 200 MILLIGRAM(S): at 18:07

## 2023-07-13 RX ADMIN — Medication 6 UNIT(S): at 17:55

## 2023-07-13 RX ADMIN — Medication 12: at 17:54

## 2023-07-13 RX ADMIN — AMLODIPINE BESYLATE 10 MILLIGRAM(S): 2.5 TABLET ORAL at 07:48

## 2023-07-13 RX ADMIN — Medication 2 UNIT(S): at 08:57

## 2023-07-13 RX ADMIN — LOSARTAN POTASSIUM 100 MILLIGRAM(S): 100 TABLET, FILM COATED ORAL at 13:07

## 2023-07-13 RX ADMIN — Medication 4 MILLIGRAM(S): at 18:07

## 2023-07-13 NOTE — CONSULT NOTE ADULT - SUBJECTIVE AND OBJECTIVE BOX
Radiation Oncology Consult    HPI:  73F with PMHx of HTN HLD, DM, R hip replacement, RA, CKD (Cr baseline ~2) presents to the  ED for unsteadiness and dizziness x1 week, underwent stroke workup with imaging findings negative for acute CVA however with incidental finding of 0.9 cm right frontal lobe brain mass suspicious for metastasis.  Body imaging showed a 2.6 x 4.1 cm somewhat spiculated mass in the EM lung.       Allergies    No Known Allergies    Intolerances        MEDICATIONS  (STANDING):  amLODIPine   Tablet 10 milliGRAM(s) Oral every 24 hours  dexAMETHasone     Tablet 4 milliGRAM(s) Oral every 6 hours  dextrose 5%. 1000 milliLiter(s) (50 mL/Hr) IV Continuous <Continuous>  dextrose 5%. 1000 milliLiter(s) (100 mL/Hr) IV Continuous <Continuous>  dextrose 50% Injectable 25 Gram(s) IV Push once  dextrose 50% Injectable 12.5 Gram(s) IV Push once  dextrose 50% Injectable 25 Gram(s) IV Push once  glucagon  Injectable 1 milliGRAM(s) IntraMuscular once  hydroxychloroquine 200 milliGRAM(s) Oral every 12 hours  ibuprofen  Tablet. 600 milliGRAM(s) Oral once  insulin glargine Injectable (LANTUS) 8 Unit(s) SubCutaneous at bedtime  insulin lispro (ADMELOG) corrective regimen sliding scale   SubCutaneous at bedtime  insulin lispro (ADMELOG) corrective regimen sliding scale   SubCutaneous three times a day before meals  insulin lispro Injectable (ADMELOG) 2 Unit(s) SubCutaneous three times a day before meals  lidocaine   4% Patch 1 Patch Transdermal every 24 hours  losartan 100 milliGRAM(s) Oral every 24 hours  metoprolol succinate  milliGRAM(s) Oral every 24 hours  sulfaSALAzine 1000 milliGRAM(s) Oral every 12 hours    MEDICATIONS  (PRN):  acetaminophen     Tablet .. 650 milliGRAM(s) Oral every 6 hours PRN Temp greater or equal to 38.5C (101.3F), Mild Pain (1 - 3), Moderate Pain (4 - 6)  dextrose Oral Gel 15 Gram(s) Oral once PRN Blood Glucose LESS THAN 70 milliGRAM(s)/deciliter      PAST MEDICAL & SURGICAL HISTORY:  HTN (hypertension)      HLD (hyperlipidemia)      DM (diabetes mellitus), type 2      Rheumatoid arthritis      Stage 4 chronic kidney disease      Degenerative joint disease (DJD) of lumbar spine      Osteopenia      S/P total right hip arthroplasty          FAMILY HISTORY:  No pertinent family history in first degree relatives        SOCIAL HISTORY: No EtOH, no tobacco    REVIEW OF SYSTEMS:    CONSTITUTIONAL: No weakness, fevers or chills  EYES/ENT: No visual changes;  No vertigo or throat pain   NECK: No pain or stiffness  RESPIRATORY: No cough, wheezing, hemoptysis; No shortness of breath  CARDIOVASCULAR: No chest pain or palpitations  GASTROINTESTINAL: No abdominal or epigastric pain. No nausea, vomiting, or hematemesis; No diarrhea or constipation. No melena or hematochezia.  GENITOURINARY: No dysuria, frequency or hematuria  NEUROLOGICAL: No numbness or weakness  SKIN: No itching, burning, rashes, or lesions   All other review of systems is negative unless indicated above.        T(F): 98.6 (07-13-23 @ 12:00), Max: 98.6 (07-13-23 @ 12:00)  HR: 74 (07-13-23 @ 12:00)  BP: 111/74 (07-13-23 @ 12:00)  RR: 16 (07-13-23 @ 12:00)  SpO2: 95% (07-13-23 @ 12:00)  Wt(kg): --    GENERAL: NAD, well-developed  HEAD:  Atraumatic, Normocephalic  EYES: EOMI, PERRLA, conjunctiva and sclera clear  NECK: Supple, No JVD  CHEST/LUNG: Clear to auscultation bilaterally; No wheeze  HEART: Regular rate and rhythm; No murmurs, rubs, or gallops  ABDOMEN: Soft, Nontender, Nondistended; Bowel sounds present  EXTREMITIES:  2+ Peripheral Pulses, No clubbing, cyanosis, or edema  NEUROLOGY: non-focal  SKIN: No rashes or lesions                          12.7   11.64 )-----------( 213      ( 13 Jul 2023 05:30 )             39.3       07-13    136  |  102  |  45<H>  ----------------------------<  390<H>  4.9   |  24  |  1.56<H>    Ca    9.4      13 Jul 2023 05:30  Phos  3.2     07-13  Mg     1.9     07-13    TPro  7.5  /  Alb  3.5  /  TBili  0.2  /  DBili  x   /  AST  20  /  ALT  12  /  AlkPhos  196<H>  07-13      Magnesium: 1.9 mg/dL (07-13 @ 05:30)  Phosphorus: 3.2 mg/dL (07-13 @ 05:30)    ACC: [id number] EXAM:  CT BRAIN   ORDERED BY: BASSAM MUÑOZ     PROCEDURE DATE:  07/09/2023          INTERPRETATION:  INDICATIONS: Dizziness.    TECHNIQUE: Serial axial images were obtained from the skull base to the   vertex without the use of intravenous contrast. Sagittal and coronal   images were reformatted.    COMPARISON EXAMINATION: None.    FINDINGS:  VENTRICLES AND SULCI: Parenchymal volume is consistent with patient age.   No hydrocephalus.  INTRA-AXIAL: No acute intracranial hemorrhage or acute transcortical   infarct is seen. Old left cerebellar and left internal capsule lacunar   infarcts. Old left thalamic lacunar infarction. Old right occipital   infarct. There is hypoattenuation of the subcortical and periventricular   white matter, which is a nonspecific finding, but most likely represents   sequela of microvascular ischemic disease.  EXTRA-AXIAL: No fluid collection is present.  VISUALIZED SINUSES: No air-fluid levels are identified.  VISUALIZED MASTOIDS: Clear.  CALVARIUM: No acute calvarial fracture.  MISCELLANEOUS: Status post bilateral ocular lens replacement. Bilateral   staphylomas noted.    IMPRESSION:  No acute intracranial hemorrhage, acute demarcated territorial   infarction, or masslike lesion. Age indeterminant left cerebellar and   left internal capsule lacunar infarcts.    I, Russ Mejia MD, have reviewed the images and the report and agree   with the findings, with the following modification: There is an area of   vasogenic edema in the right frontoparietal region with associated sulcal   effacement which could indicate underlying mass. Recommend further   assessment with postcontrast brain MRI.      There are old left cerebellar and left internal capsule lacunar   infarctions. Old left thalamic lacunar infarction. Old right occipital   infarction.    --- End of Report ---      ACC: [id number] EXAM:  MR BRAIN   ORDERED BY: JON MACK   PROCEDURE DATE:  07/09/2023    INTERPRETATION:  PROCEDURE: MRI Brain without contrast    INDICATION: Vasogenic edema on CT head    TECHNIQUE: Multi-planar multi-sequential 3.0 Monica MR imaging of the   brain was performed without intravenous contrast.    COMPARISON: Correlation made with CT head from 7/9/2023    FINDINGS:    Exam is motion degraded.    There is age-indeterminate edema in the right superior/posterior frontal   lobe and parietal lobes. An 8 mm targetoid lesion is suspected at cortex   of the far posterior precentral gyrus on series 24, image 30; series 10,   image 29; suspicious for metastasis or possible atypical infection.   Contrast imaging is advised to assess for multiplicity. Diffusion imaging   is negative at this site and elsewhere.    Ventricles are not enlarged. No extra-axial collection or midline shift.    Elsewhere there is scattered punctate and confluent areas of T2-FLAIR   signal hyperintensity in the periventricular and subcortical white matter   most consistent with chronic small vessel ischemic disease, also seen in   the juan daniel. Chronic lacunar infarct in the left cerebellar hemisphere, and   left thalamus, internal capsule and caudate. Chronic cortical infarct   involves the right occipital lobe.    The susceptibility weighted images demonstrate no parenchymal blood   products allowing for motion limitation. There are preserved vascular   flow-voids.    The visualized paranasal sinuses are free of mucosal disease. The mastoid   air cells are well-aerated. 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      IMPRESSION:    An 8 mm cortically based nodule in the posterior-superior frontal lobe   (motor strip) is present with extensive vasogenic edema. Metastasis or   focus of atypical infection are leading differential considerations. The   patient should return for contrast-enhanced MR imaging (unless   contraindicated) to assess for multiplicity.    No recent infarct. Chronic infarcts and white matter microangiopathic   change as above.    --- End of Report ---      ACC: [id number] EXAM:  CT ABDOMEN AND PELVIS OC IC   ORDERED BY: MELVIN BRICENO     ACC: [id number] EXAM:  CT CHEST OC IC   ORDERED BY: MELVIN BRICENO     PROCEDURE DATE:  07/11/2023          INTERPRETATION:  CLINICAL INFORMATION: Question malignancy    COMPARISON: None.    CONTRAST/COMPLICATIONS:  IV Contrast: Isovue 370  93 cc administered   7 cc discarded  Oral Contrast: NONE (accession [id number]), Omnipaque 300 (accession   [id number])  Complications: None reported at time of study completion    PROCEDURE:  CT of the Chest, Abdomen and Pelvis was performed.  Sagittal and coronal reformats were performed.    FINDINGS:  CHEST:  LUNGS AND LARGE AIRWAYS: Patent central airways. A somewhat spiculated,   proximate 2.6 x 4.1 cm mass is seen in the posterior aspect of the left   upper lobe, abutting the major fissure. The mass extends into the   adjacent left hilum and extends laterally to the left lateral pleura.   More nonspecific parenchymal density is seen in the anterior aspect of   the left lower lobe abutting the major fissure.  PLEURA: Trace left pleural effusion  VESSELS: Within normal limits.  HEART: Mild cardiomegaly. No pericardial effusion.  MEDIASTINUM AND CODY: Scattered, nonpathologically enlarged AP window   lymph and left suprahilar nodes are seen. However, given the   above-described left upper lobe mass, cannot exclude local cyndi   involvement.  CHEST WALL AND LOWER NECK: Within normal limits.    ABDOMEN AND PELVIS:  LIVER: Indeterminate, proximate 1.5 cm low-attenuation lesion is seen in   the left lobe of the liver.  BILE DUCTS: Normal caliber.  GALLBLADDER: Cholelithiasis. No gallbladder wall thickening  SPLEEN: Within normal limits.  PANCREAS: 1.2 cm pancreatic body cystic lesion. No pancreatic ductal   dilatation.  ADRENALS: Within normal limits.  KIDNEYS/URETERS: Left upper pole simple renal cyst measuring 4.8 cm.   Scattered other subcentimeter bilateral cortical low-attenuation lesions   too small to characterize, but are likely cysts. No renal collecting   system obstruction bilaterally.    BLADDER: Within normal limits.  REPRODUCTIVE ORGANS: Multiple calcified uterine fibroids. The dominant   lesion is seen in the left anterior uterus, measuring approximately 9.2   cm. No adnexal masses.    BOWEL: No bowel obstruction. Appendix is normal.  PERITONEUM: No ascites.  VESSELS: No abdominal aortic aneurysm. Multifocal atherosclerotic   vascular calcifications seen in the iliac arteries and common femoral   arteries bilaterally  RETROPERITONEUM/LYMPH NODES: No lymphadenopathy.  ABDOMINAL WALL: Within normal limits.  BONES: Partially visualized left shoulder joint effusion with   degenerative changes. No suspicious osseous lesions    IMPRESSION:  1.  Left upper lobe somewhat spiculated mass, suspicious for neoplasm, as   described.  2.  More nonspecific left lower lobe airspace opacities; cannot exclude   mass lesion versus infectious etiology.  3.  Indeterminate low-attenuation lesion seen in left lobe of liver;   cannot exclude metastasis. If clinically indicated MRI may be helpful for   further evaluation.  4.  1.2 cm pancreas body cystic lesion; possible IPMN (intraductal   papillary mucinous neoplasm). MRI may be helpful for this lesion also.  5.  Left shoulder joint effusion with degenerative changes.    --- End of Report ---            ACC: [id number] EXAM:  MR BRAIN WAW IC   ORDERED BY: ELISA HARRINGTON     PROCEDURE DATE:  07/11/2023          INTERPRETATION:  PROCEDURE: MRI Brain with and without contrast    INDICATION: Right frontal lobe brain mass    TECHNIQUE: Sagittal volumetric T1 with MPR, axial and coronal T2 FLAIR,   axial T2, GRE and diffusion imaging of the brain is obtained. Following   the intravenous administration of 7.5 mL Gadavist contrast material,   axial T1 and sagittal volumetric T1 imaging with MPR provided.    COMPARISON: MRI brain 7/9/2023    FINDINGS: Examination is degraded by motion particularly on the   postcontrast imaging.    There is an enhancing lesion in the right frontal lobe which measures 0.9   cm transverse by 0.7 cm AP by  0.9 cm craniocaudal (series 14 image 28   and series 104 image 139). There is surrounding vasogenic edema within   the right frontal and parietal lobes with mild local mass effect. There   is no midline shift. There are no other enhancing lesions within   limitations of motion artifact.    The ventricles are appropriate for patient's age. There is no evidence of   recent infarction diffusion-weighted imaging. There are patchy foci of   T2/FLAIR hyperintensity within the periventricular subcortical white   matter and basis of chronic microvascular ischemic changes. There is a   chronic right occipital lobe infarction.    There is no susceptibility related signal loss to suggest hemosiderin   deposition or calcification.    The vascular flow voids are present.    The native lenses have been replaced. There is elongation of the   bilateral globes. The paranasal sinuses and mastoid air cells are well   ventilated.    IMPRESSION:    0.9 cm enhancing lesion in the right frontal lobe surrounding vasogenic   edema which is suspicious for intracranial metastasis. No definite   additional lesions noted within limitations of motion artifact.    --- End of Report ---

## 2023-07-13 NOTE — DIETITIAN INITIAL EVALUATION ADULT - ORAL INTAKE PTA/DIET HISTORY
patient reports eating well up ot admission without decline in intake patterns. weight loss of 80# x 1 year was purposeful due ot obesity and MD endorsed. Slow, gradual weight loss over 1 year with healthy eating patterns reviewed as typical. Patient following [employer] approximately at home prior to admission per her report in interview with writer.

## 2023-07-13 NOTE — DIETITIAN INITIAL EVALUATION ADULT - NSFNSADHERENCEPTAFT_GEN_A_CORE
Per interview, patient previous intake:  is aware of sources of healthy fats, protein and carbohydrates as well as high K content vegetables   eats well typically and has purposely lost 80# with her doctor's endorsement over the past 1 year  No decline in intake patterns prior to admission per her report

## 2023-07-13 NOTE — DIETITIAN INITIAL EVALUATION ADULT - PERTINENT MEDS FT
MEDICATIONS  (STANDING):  amLODIPine   Tablet 10 milliGRAM(s) Oral every 24 hours  dexAMETHasone     Tablet 4 milliGRAM(s) Oral every 6 hours  dextrose 5%. 1000 milliLiter(s) (100 mL/Hr) IV Continuous <Continuous>  dextrose 5%. 1000 milliLiter(s) (50 mL/Hr) IV Continuous <Continuous>  dextrose 50% Injectable 25 Gram(s) IV Push once  dextrose 50% Injectable 12.5 Gram(s) IV Push once  dextrose 50% Injectable 25 Gram(s) IV Push once  glucagon  Injectable 1 milliGRAM(s) IntraMuscular once  hydroxychloroquine 200 milliGRAM(s) Oral every 12 hours  ibuprofen  Tablet. 600 milliGRAM(s) Oral once  insulin glargine Injectable (LANTUS) 8 Unit(s) SubCutaneous at bedtime  insulin lispro (ADMELOG) corrective regimen sliding scale   SubCutaneous three times a day before meals  insulin lispro (ADMELOG) corrective regimen sliding scale   SubCutaneous at bedtime  insulin lispro Injectable (ADMELOG) 6 Unit(s) SubCutaneous three times a day before meals  lidocaine   4% Patch 1 Patch Transdermal every 24 hours  losartan 100 milliGRAM(s) Oral every 24 hours  metoprolol succinate  milliGRAM(s) Oral every 24 hours  sulfaSALAzine 1000 milliGRAM(s) Oral every 12 hours    MEDICATIONS  (PRN):  acetaminophen     Tablet .. 650 milliGRAM(s) Oral every 6 hours PRN Temp greater or equal to 38.5C (101.3F), Mild Pain (1 - 3), Moderate Pain (4 - 6)  dextrose Oral Gel 15 Gram(s) Oral once PRN Blood Glucose LESS THAN 70 milliGRAM(s)/deciliter

## 2023-07-13 NOTE — PROGRESS NOTE ADULT - SUBJECTIVE AND OBJECTIVE BOX
INTERVAL HPI/OVERNIGHT EVENTS:  Awake and aware of diagnosis;  For Lung biopsy tomorrow;  On Decadron an increase of blood sugars noted;  Needs physical therapy  Left arm improved;          MEDICATIONS  (STANDING):  amLODIPine   Tablet 10 milliGRAM(s) Oral every 24 hours  dexAMETHasone     Tablet 4 milliGRAM(s) Oral every 6 hours  dextrose 5%. 1000 milliLiter(s) (100 mL/Hr) IV Continuous <Continuous>  dextrose 5%. 1000 milliLiter(s) (50 mL/Hr) IV Continuous <Continuous>  dextrose 50% Injectable 25 Gram(s) IV Push once  dextrose 50% Injectable 25 Gram(s) IV Push once  dextrose 50% Injectable 12.5 Gram(s) IV Push once  glucagon  Injectable 1 milliGRAM(s) IntraMuscular once  hydroxychloroquine 200 milliGRAM(s) Oral every 12 hours  ibuprofen  Tablet. 600 milliGRAM(s) Oral once  insulin glargine Injectable (LANTUS) 8 Unit(s) SubCutaneous at bedtime  insulin lispro (ADMELOG) corrective regimen sliding scale   SubCutaneous at bedtime  insulin lispro (ADMELOG) corrective regimen sliding scale   SubCutaneous three times a day before meals  insulin lispro Injectable (ADMELOG) 2 Unit(s) SubCutaneous three times a day before meals  lidocaine   4% Patch 1 Patch Transdermal every 24 hours  losartan 100 milliGRAM(s) Oral every 24 hours  metoprolol succinate  milliGRAM(s) Oral every 24 hours  sulfaSALAzine 1000 milliGRAM(s) Oral every 12 hours    MEDICATIONS  (PRN):  acetaminophen     Tablet .. 650 milliGRAM(s) Oral every 6 hours PRN Temp greater or equal to 38.5C (101.3F), Mild Pain (1 - 3), Moderate Pain (4 - 6)  dextrose Oral Gel 15 Gram(s) Oral once PRN Blood Glucose LESS THAN 70 milliGRAM(s)/deciliter      Allergies    No Known Allergies    Intolerances        Vital Signs Last 24 Hrs  T(C): 36.6 (13 Jul 2023 08:49), Max: 36.7 (12 Jul 2023 21:05)  T(F): 97.8 (13 Jul 2023 08:49), Max: 98.1 (12 Jul 2023 21:05)  HR: 75 (13 Jul 2023 08:49) (65 - 82)  BP: 106/68 (13 Jul 2023 08:49) (106/68 - 134/86)  BP(mean): --  RR: 16 (13 Jul 2023 08:49) (16 - 18)  SpO2: 100% (13 Jul 2023 08:49) (94% - 100%)    Parameters below as of 13 Jul 2023 08:49  Patient On (Oxygen Delivery Method): room air              Constitutional:  Awake     Eyes: NEDRA    ENMT: Negative    Neck: Supple    Back:  no tenderness     Respiratory:  clear    Cardiovascular: S1 S2    Gastrointestinal:  soft    Genitourinary:    Extremities: no edema     Vascular:    Neurological:    Skin:    Lymph Nodes:            07-13 @ 07:01  -  07-13 @ 10:50  --------------------------------------------------------  IN: 0 mL / OUT: 1000 mL / NET: -1000 mL      LABS:                        12.7   11.64 )-----------( 213      ( 13 Jul 2023 05:30 )             39.3     07-13    136  |  102  |  45<H>  ----------------------------<  390<H>  4.9   |  24  |  1.56<H>    Ca    9.4      13 Jul 2023 05:30  Phos  3.2     07-13  Mg     1.9     07-13    TPro  7.5  /  Alb  3.5  /  TBili  0.2  /  DBili  x   /  AST  20  /  ALT  12  /  AlkPhos  196<H>  07-13      Urinalysis Basic - ( 13 Jul 2023 05:30 )    Color: x / Appearance: x / SG: x / pH: x  Gluc: 390 mg/dL / Ketone: x  / Bili: x / Urobili: x   Blood: x / Protein: x / Nitrite: x   Leuk Esterase: x / RBC: x / WBC x   Sq Epi: x / Non Sq Epi: x / Bacteria: x        RADIOLOGY & ADDITIONAL TESTS:

## 2023-07-13 NOTE — DIETITIAN INITIAL EVALUATION ADULT - LITERATURE/VIDEOS GIVEN
Patient provided with education by writer per diet a prescribed, reviewed emergency measures, testing BS levels, reviewed sources of carbohydrates, protein, healthy fats and restrictions as related to CCD diet. High protein high calorie foods encouraged after discharge to support repletion. Patient verbalized understanding and agreement with plan of care and intent to comply after discharge. Handouts given to reinforce education for discharge per diabetes and diet plus renal appropriate foods.

## 2023-07-13 NOTE — CONSULT NOTE ADULT - ASSESSMENT
73F with PMHx of HTN HLD, DM, R hip replacement, RA, CKD (Cr baseline ~2) presents to the  ED for unsteadiness and dizziness x1 week, underwent stroke workup with imaging findings negative for acute CVA however with incidental finding of 0.9 cm right frontal lobe brain mass suspicious for metastasis.  Body imaging showed a 2.6 x 4.1 cm somewhat spiculated mass in the EM lung.

## 2023-07-13 NOTE — DIETITIAN INITIAL EVALUATION ADULT - PERTINENT LABORATORY DATA
07-13    136  |  102  |  45<H>  ----------------------------<  390<H>  4.9   |  24  |  1.56<H>    Ca    9.4      13 Jul 2023 05:30  Phos  3.2     07-13  Mg     1.9     07-13    TPro  7.5  /  Alb  3.5  /  TBili  0.2  /  DBili  x   /  AST  20  /  ALT  12  /  AlkPhos  196<H>  07-13  POCT Blood Glucose.: 397 mg/dL (07-13-23 @ 13:11)  A1C with Estimated Average Glucose Result: 7.4 % (07-10-23 @ 05:30)

## 2023-07-13 NOTE — DIETITIAN INITIAL EVALUATION ADULT - NSFNSNUTRCHEWSWALLOWFT_GEN_A_CORE
none; has left bottom dentures at home but reported that she can still chew without problem of regular texture

## 2023-07-13 NOTE — PROGRESS NOTE ADULT - PROBLEM SELECTOR PLAN 1
Workup for falls with potential brain mass etiology.   - MRA H/N without large vessel occlusion or HGS   - MRI w/o contrast: An 8 mm cortically based nodule in the posterior-superior frontal lobe (motor strip) is present with extensive vasogenic edema. Metastasis or focus of atypical infection are leading differential considerations  - ASA, Plavix, and Atorvastatin discontinued to prevent ICH  - q6hr neuro checks and vitals  - neurosurgery recs appreciated: decadron 10 mg IV once, decadron 4 mg q6h taper to 2 mg BID over one week, discharge on 2 mg BID  - NPO after midnight pending bronchoscopy 7/14/23    - pending MRI w/ contrast w/ Brody  - pending CT chest/abdomen/pelvis to r/o malignancy  - if altered/concerning, consider seizure given vasogenic edema  - eventual outpatient neurology follow up    - Stroke Code HCT Results: age indeterminate L cerebellar lacunar infarcts  - Stroke Code CTA Results: deferred due to CKD  - Stroke education.

## 2023-07-13 NOTE — DIETITIAN INITIAL EVALUATION ADULT - NSFNSNUTRCULTURALRELIGIOUSFT_GEN_A_CORE
Writer reviewed preferences; no specific preferences expressed and patient receives room service assist daily to obtain preferences at every meal and she expressed satisfaction with this.

## 2023-07-13 NOTE — PROGRESS NOTE ADULT - SUBJECTIVE AND OBJECTIVE BOX
INTERVAL HPI/OVERNIGHT EVENTS:    SUBJECTIVE      MEDICATIONS  (STANDING):        Allergies    No Known Allergies    Intolerances        Vital Signs Last 24 Hrs                Constitutional: Awake     Eyes: NEDRA    ENMT: Negative    Neck: Supple    Back:  no tenderness     Respiratory:  clear    Cardiovascular: S1 S2    Gastrointestinal: soft     Genitourinary:    Extremities:  no edema ; Increased ROM left arm    Vascular:    Neurological:    Skin:    Lymph Nodes:            07-11 @ 07:01  -  07-12 @ 07:00  --------------------------------------------------------  IN: 0 mL / OUT: 500 mL / NET: -500 mL      LABS:                    INTERVAL HPI/OVERNIGHT EVENTS: no acute events overnight. Patient reports she is feeling much better after the dose of IV steroids. Pain in the left shoulder is greatly reduced. Denies fever, headache, CP, SOB, abdominal pain, n/v/d.    MEDICATIONS  (STANDING):  amLODIPine   Tablet 10 milliGRAM(s) Oral every 24 hours  dexAMETHasone     Tablet 4 milliGRAM(s) Oral every 6 hours  dextrose 5%. 1000 milliLiter(s) (50 mL/Hr) IV Continuous <Continuous>  dextrose 5%. 1000 milliLiter(s) (100 mL/Hr) IV Continuous <Continuous>  dextrose 50% Injectable 25 Gram(s) IV Push once  dextrose 50% Injectable 12.5 Gram(s) IV Push once  dextrose 50% Injectable 25 Gram(s) IV Push once  glucagon  Injectable 1 milliGRAM(s) IntraMuscular once  hydroxychloroquine 200 milliGRAM(s) Oral every 12 hours  ibuprofen  Tablet. 600 milliGRAM(s) Oral once  insulin glargine Injectable (LANTUS) 8 Unit(s) SubCutaneous at bedtime  insulin lispro (ADMELOG) corrective regimen sliding scale   SubCutaneous at bedtime  insulin lispro (ADMELOG) corrective regimen sliding scale   SubCutaneous three times a day before meals  insulin lispro Injectable (ADMELOG) 6 Unit(s) SubCutaneous three times a day before meals  lidocaine   4% Patch 1 Patch Transdermal every 24 hours  losartan 100 milliGRAM(s) Oral every 24 hours  metoprolol succinate  milliGRAM(s) Oral every 24 hours  sulfaSALAzine 1000 milliGRAM(s) Oral every 12 hours    MEDICATIONS  (PRN):  acetaminophen     Tablet .. 650 milliGRAM(s) Oral every 6 hours PRN Temp greater or equal to 38.5C (101.3F), Mild Pain (1 - 3), Moderate Pain (4 - 6)  dextrose Oral Gel 15 Gram(s) Oral once PRN Blood Glucose LESS THAN 70 milliGRAM(s)/deciliter    Physical Exam  GENERAL: NAD, lying in bed comfortably  HEAD:  Atraumatic, normocephalic  EYES: EOMI, PERRLA, conjunctiva and sclera clear  NECK: Supple, trachea midline, no JVD  HEART: Regular rate and rhythm  LUNGS: Unlabored respirations.  Clear to auscultation bilaterally  ABDOMEN: Soft, nontender, nondistended, +BS  EXTREMITIES: 2+ peripheral pulses bilaterally. No clubbing, cyanosis, or edema  NERVOUS SYSTEM:  A&Ox3, moving all extremities, weakness in the left shoulder  SKIN: No rashes or lesions    I&O's Summary    13 Jul 2023 07:01  -  13 Jul 2023 17:01  --------------------------------------------------------  IN: 0 mL / OUT: 1000 mL / NET: -1000 mL      LABS:                         12.7   11.64 )-----------( 213      ( 13 Jul 2023 05:30 )             39.3     07-13    136  |  102  |  45<H>  ----------------------------<  390<H>  4.9   |  24  |  1.56<H>    Ca    9.4      13 Jul 2023 05:30  Phos  3.2     07-13  Mg     1.9     07-13    TPro  7.5  /  Alb  3.5  /  TBili  0.2  /  DBili  x   /  AST  20  /  ALT  12  /  AlkPhos  196<H>  07-13      Urinalysis Basic - ( 13 Jul 2023 05:30 )    Color: x / Appearance: x / SG: x / pH: x  Gluc: 390 mg/dL / Ketone: x  / Bili: x / Urobili: x   Blood: x / Protein: x / Nitrite: x   Leuk Esterase: x / RBC: x / WBC x   Sq Epi: x / Non Sq Epi: x / Bacteria: x                RADIOLOGY, EKG & ADDITIONAL TESTS: Reviewed.

## 2023-07-13 NOTE — DIETITIAN INITIAL EVALUATION ADULT - REASON
patient reports no decline in intake patterns prior ot admission and weight loss was MD endorsed and planned with good, healthy eating patterns adopted ot support healthy weight loss strategies. no edema noted at interview but patient reports legs will swell if she leaves them hanging down when she sits for a while at home due to medication side effect. Does not meet criteria for malnutrition at this time per [facility] guidelines.

## 2023-07-13 NOTE — PROGRESS NOTE ADULT - TIME BILLING
Patient seen and examined;  Plans for lung biopsy tomorrow;  Will likely need insulin with the start of decadron  She will need to be on the decadron for a prolonged period   RT consult   Will discuss plans with oncology Patient seen and examined;  Plans for lung biopsy tomorrow;  Will likely need insulin with the start of decadron  She will need to be on the decadron for a prolonged period  Get Endocrine consult to help with management   RT consult   Will discuss plans with oncology

## 2023-07-14 ENCOUNTER — RESULT REVIEW (OUTPATIENT)
Age: 73
End: 2023-07-14

## 2023-07-14 ENCOUNTER — TRANSCRIPTION ENCOUNTER (OUTPATIENT)
Age: 73
End: 2023-07-14

## 2023-07-14 LAB
ANION GAP SERPL CALC-SCNC: 9 MMOL/L — SIGNIFICANT CHANGE UP (ref 5–17)
APTT BLD: 26.5 SEC — LOW (ref 27.5–35.5)
BLD GP AB SCN SERPL QL: NEGATIVE — SIGNIFICANT CHANGE UP
BUN SERPL-MCNC: 53 MG/DL — HIGH (ref 7–23)
CALCIUM SERPL-MCNC: 9.5 MG/DL — SIGNIFICANT CHANGE UP (ref 8.4–10.5)
CHLORIDE SERPL-SCNC: 105 MMOL/L — SIGNIFICANT CHANGE UP (ref 96–108)
CO2 SERPL-SCNC: 25 MMOL/L — SIGNIFICANT CHANGE UP (ref 22–31)
CREAT SERPL-MCNC: 1.58 MG/DL — HIGH (ref 0.5–1.3)
EGFR: 34 ML/MIN/1.73M2 — LOW
GLUCOSE BLDC GLUCOMTR-MCNC: 196 MG/DL — HIGH (ref 70–99)
GLUCOSE BLDC GLUCOMTR-MCNC: 207 MG/DL — HIGH (ref 70–99)
GLUCOSE BLDC GLUCOMTR-MCNC: 209 MG/DL — HIGH (ref 70–99)
GLUCOSE BLDC GLUCOMTR-MCNC: 219 MG/DL — HIGH (ref 70–99)
GLUCOSE BLDC GLUCOMTR-MCNC: 230 MG/DL — HIGH (ref 70–99)
GLUCOSE BLDC GLUCOMTR-MCNC: 298 MG/DL — HIGH (ref 70–99)
GLUCOSE BLDC GLUCOMTR-MCNC: 341 MG/DL — HIGH (ref 70–99)
GLUCOSE BLDC GLUCOMTR-MCNC: 355 MG/DL — HIGH (ref 70–99)
GLUCOSE SERPL-MCNC: 248 MG/DL — HIGH (ref 70–99)
HCT VFR BLD CALC: 39.3 % — SIGNIFICANT CHANGE UP (ref 34.5–45)
HGB BLD-MCNC: 12.7 G/DL — SIGNIFICANT CHANGE UP (ref 11.5–15.5)
INR BLD: 1.06 — SIGNIFICANT CHANGE UP (ref 0.88–1.16)
MAGNESIUM SERPL-MCNC: 2 MG/DL — SIGNIFICANT CHANGE UP (ref 1.6–2.6)
MCHC RBC-ENTMCNC: 30.3 PG — SIGNIFICANT CHANGE UP (ref 27–34)
MCHC RBC-ENTMCNC: 32.3 GM/DL — SIGNIFICANT CHANGE UP (ref 32–36)
MCV RBC AUTO: 93.8 FL — SIGNIFICANT CHANGE UP (ref 80–100)
NRBC # BLD: 0 /100 WBCS — SIGNIFICANT CHANGE UP (ref 0–0)
PHOSPHATE SERPL-MCNC: 3 MG/DL — SIGNIFICANT CHANGE UP (ref 2.5–4.5)
PLATELET # BLD AUTO: 231 K/UL — SIGNIFICANT CHANGE UP (ref 150–400)
POTASSIUM SERPL-MCNC: 4.6 MMOL/L — SIGNIFICANT CHANGE UP (ref 3.5–5.3)
POTASSIUM SERPL-SCNC: 4.6 MMOL/L — SIGNIFICANT CHANGE UP (ref 3.5–5.3)
PROTHROM AB SERPL-ACNC: 12.6 SEC — SIGNIFICANT CHANGE UP (ref 10.5–13.4)
RBC # BLD: 4.19 M/UL — SIGNIFICANT CHANGE UP (ref 3.8–5.2)
RBC # FLD: 13.2 % — SIGNIFICANT CHANGE UP (ref 10.3–14.5)
RH IG SCN BLD-IMP: POSITIVE — SIGNIFICANT CHANGE UP
SODIUM SERPL-SCNC: 139 MMOL/L — SIGNIFICANT CHANGE UP (ref 135–145)
WBC # BLD: 17.56 K/UL — HIGH (ref 3.8–10.5)
WBC # FLD AUTO: 17.56 K/UL — HIGH (ref 3.8–10.5)

## 2023-07-14 PROCEDURE — 31654 BRONCH EBUS IVNTJ PERPH LES: CPT | Mod: GC

## 2023-07-14 PROCEDURE — 31628 BRONCHOSCOPY/LUNG BX EACH: CPT | Mod: GC

## 2023-07-14 PROCEDURE — 31624 DX BRONCHOSCOPE/LAVAGE: CPT | Mod: GC

## 2023-07-14 PROCEDURE — 71045 X-RAY EXAM CHEST 1 VIEW: CPT | Mod: 26

## 2023-07-14 PROCEDURE — 31629 BRONCHOSCOPY/NEEDLE BX EACH: CPT | Mod: GC

## 2023-07-14 PROCEDURE — 88305 TISSUE EXAM BY PATHOLOGIST: CPT | Mod: 26

## 2023-07-14 PROCEDURE — 77334 RADIATION TREATMENT AID(S): CPT | Mod: 26

## 2023-07-14 PROCEDURE — 88342 IMHCHEM/IMCYTCHM 1ST ANTB: CPT | Mod: 26

## 2023-07-14 PROCEDURE — 88173 CYTOPATH EVAL FNA REPORT: CPT | Mod: 26

## 2023-07-14 PROCEDURE — 99232 SBSQ HOSP IP/OBS MODERATE 35: CPT | Mod: GC

## 2023-07-14 PROCEDURE — 88341 IMHCHEM/IMCYTCHM EA ADD ANTB: CPT | Mod: 26

## 2023-07-14 PROCEDURE — 31652 BRONCH EBUS SAMPLNG 1/2 NODE: CPT | Mod: GC

## 2023-07-14 PROCEDURE — 77263 THER RADIOLOGY TX PLNG CPLX: CPT

## 2023-07-14 PROCEDURE — 88112 CYTOPATH CELL ENHANCE TECH: CPT | Mod: 26,59

## 2023-07-14 PROCEDURE — 31627 NAVIGATIONAL BRONCHOSCOPY: CPT | Mod: GC

## 2023-07-14 PROCEDURE — 77290 THER RAD SIMULAJ FIELD CPLX: CPT | Mod: 26

## 2023-07-14 DEVICE — CATH EMB 1LUM LTX 4FRX80CM: Type: IMPLANTABLE DEVICE | Status: FUNCTIONAL

## 2023-07-14 RX ORDER — INSULIN GLARGINE 100 [IU]/ML
16 INJECTION, SOLUTION SUBCUTANEOUS AT BEDTIME
Refills: 0 | Status: DISCONTINUED | OUTPATIENT
Start: 2023-07-14 | End: 2023-07-18

## 2023-07-14 RX ORDER — INSULIN LISPRO 100/ML
10 VIAL (ML) SUBCUTANEOUS
Refills: 0 | Status: DISCONTINUED | OUTPATIENT
Start: 2023-07-14 | End: 2023-07-18

## 2023-07-14 RX ORDER — INSULIN LISPRO 100/ML
6 VIAL (ML) SUBCUTANEOUS ONCE
Refills: 0 | Status: COMPLETED | OUTPATIENT
Start: 2023-07-14 | End: 2023-07-14

## 2023-07-14 RX ADMIN — Medication 100 MILLIGRAM(S): at 09:38

## 2023-07-14 RX ADMIN — Medication 200 MILLIGRAM(S): at 06:28

## 2023-07-14 RX ADMIN — Medication 4 MILLIGRAM(S): at 09:39

## 2023-07-14 RX ADMIN — Medication 650 MILLIGRAM(S): at 09:39

## 2023-07-14 RX ADMIN — LIDOCAINE 1 PATCH: 4 CREAM TOPICAL at 19:29

## 2023-07-14 RX ADMIN — Medication 4 MILLIGRAM(S): at 21:33

## 2023-07-14 RX ADMIN — Medication 4: at 09:40

## 2023-07-14 RX ADMIN — AMLODIPINE BESYLATE 10 MILLIGRAM(S): 2.5 TABLET ORAL at 06:25

## 2023-07-14 RX ADMIN — Medication 650 MILLIGRAM(S): at 10:39

## 2023-07-14 RX ADMIN — Medication 1000 MILLIGRAM(S): at 19:19

## 2023-07-14 RX ADMIN — Medication 6 UNIT(S): at 03:05

## 2023-07-14 RX ADMIN — Medication 4: at 13:46

## 2023-07-14 RX ADMIN — Medication 2: at 19:18

## 2023-07-14 RX ADMIN — Medication 1000 MILLIGRAM(S): at 06:28

## 2023-07-14 RX ADMIN — INSULIN GLARGINE 16 UNIT(S): 100 INJECTION, SOLUTION SUBCUTANEOUS at 22:22

## 2023-07-14 RX ADMIN — Medication 4 MILLIGRAM(S): at 02:29

## 2023-07-14 RX ADMIN — LIDOCAINE 1 PATCH: 4 CREAM TOPICAL at 07:18

## 2023-07-14 RX ADMIN — Medication 200 MILLIGRAM(S): at 19:20

## 2023-07-14 NOTE — PROGRESS NOTE ADULT - SUBJECTIVE AND OBJECTIVE BOX
INTERVAL HPI/OVERNIGHT EVENTS: no acute events overnight. Slept well, is NPO with meds for bronchoscopy today.     MEDICATIONS  (STANDING):  amLODIPine   Tablet 10 milliGRAM(s) Oral every 24 hours  dexAMETHasone     Tablet 4 milliGRAM(s) Oral every 6 hours  dextrose 5%. 1000 milliLiter(s) (50 mL/Hr) IV Continuous <Continuous>  dextrose 5%. 1000 milliLiter(s) (100 mL/Hr) IV Continuous <Continuous>  dextrose 50% Injectable 25 Gram(s) IV Push once  dextrose 50% Injectable 12.5 Gram(s) IV Push once  dextrose 50% Injectable 25 Gram(s) IV Push once  glucagon  Injectable 1 milliGRAM(s) IntraMuscular once  hydroxychloroquine 200 milliGRAM(s) Oral every 12 hours  ibuprofen  Tablet. 600 milliGRAM(s) Oral once  insulin glargine Injectable (LANTUS) 8 Unit(s) SubCutaneous at bedtime  insulin lispro (ADMELOG) corrective regimen sliding scale   SubCutaneous at bedtime  insulin lispro (ADMELOG) corrective regimen sliding scale   SubCutaneous three times a day before meals  insulin lispro Injectable (ADMELOG) 6 Unit(s) SubCutaneous three times a day before meals  lidocaine   4% Patch 1 Patch Transdermal every 24 hours  losartan 100 milliGRAM(s) Oral every 24 hours  metoprolol succinate  milliGRAM(s) Oral every 24 hours  sulfaSALAzine 1000 milliGRAM(s) Oral every 12 hours    MEDICATIONS  (PRN):  acetaminophen     Tablet .. 650 milliGRAM(s) Oral every 6 hours PRN Temp greater or equal to 38.5C (101.3F), Mild Pain (1 - 3), Moderate Pain (4 - 6)  dextrose Oral Gel 15 Gram(s) Oral once PRN Blood Glucose LESS THAN 70 milliGRAM(s)/deciliter    Physical Exam  GENERAL: NAD, lying in bed comfortably  HEAD:  Atraumatic, normocephalic  EYES: EOMI, PERRLA, conjunctiva and sclera clear  NECK: Supple, trachea midline, no JVD  HEART: Regular rate and rhythm  LUNGS: Unlabored respirations.  Clear to auscultation bilaterally  ABDOMEN: Soft, nontender, nondistended,  EXTREMITIES: 2+ peripheral pulses bilaterally. No clubbing, cyanosis, or edema  NERVOUS SYSTEM:  A&Ox3, moving all extremities, weakness in the left shoulder  SKIN: No rashes or lesions    I&O's Summary    13 Jul 2023 07:01  -  13 Jul 2023 17:01  --------------------------------------------------------  IN: 0 mL / OUT: 1000 mL / NET: -1000 mL      LABS:                         12.7   11.64 )-----------( 213      ( 13 Jul 2023 05:30 )             39.3     07-13    136  |  102  |  45<H>  ----------------------------<  390<H>  4.9   |  24  |  1.56<H>    Ca    9.4      13 Jul 2023 05:30  Phos  3.2     07-13  Mg     1.9     07-13    TPro  7.5  /  Alb  3.5  /  TBili  0.2  /  DBili  x   /  AST  20  /  ALT  12  /  AlkPhos  196<H>  07-13      Urinalysis Basic - ( 13 Jul 2023 05:30 )    Color: x / Appearance: x / SG: x / pH: x  Gluc: 390 mg/dL / Ketone: x  / Bili: x / Urobili: x   Blood: x / Protein: x / Nitrite: x   Leuk Esterase: x / RBC: x / WBC x   Sq Epi: x / Non Sq Epi: x / Bacteria: x                RADIOLOGY, EKG & ADDITIONAL TESTS: Reviewed.                     INTERVAL HPI/OVERNIGHT EVENTS: no acute events overnight. Slept well, is NPO with meds for bronchoscopy today.     MEDICATIONS  (STANDING):  amLODIPine   Tablet 10 milliGRAM(s) Oral every 24 hours  dexAMETHasone     Tablet 4 milliGRAM(s) Oral every 6 hours  dextrose 5%. 1000 milliLiter(s) (100 mL/Hr) IV Continuous <Continuous>  dextrose 5%. 1000 milliLiter(s) (50 mL/Hr) IV Continuous <Continuous>  dextrose 50% Injectable 25 Gram(s) IV Push once  dextrose 50% Injectable 12.5 Gram(s) IV Push once  dextrose 50% Injectable 25 Gram(s) IV Push once  glucagon  Injectable 1 milliGRAM(s) IntraMuscular once  hydroxychloroquine 200 milliGRAM(s) Oral every 12 hours  ibuprofen  Tablet. 600 milliGRAM(s) Oral once  insulin glargine Injectable (LANTUS) 20 Unit(s) SubCutaneous at bedtime  insulin lispro (ADMELOG) corrective regimen sliding scale   SubCutaneous three times a day before meals  insulin lispro Injectable (ADMELOG) 6 Unit(s) SubCutaneous three times a day before meals  lidocaine   4% Patch 1 Patch Transdermal every 24 hours  losartan 100 milliGRAM(s) Oral every 24 hours  metoprolol succinate  milliGRAM(s) Oral every 24 hours  sulfaSALAzine 1000 milliGRAM(s) Oral every 12 hours    MEDICATIONS  (PRN):  acetaminophen     Tablet .. 650 milliGRAM(s) Oral every 6 hours PRN Temp greater or equal to 38.5C (101.3F), Mild Pain (1 - 3), Moderate Pain (4 - 6)  dextrose Oral Gel 15 Gram(s) Oral once PRN Blood Glucose LESS THAN 70 milliGRAM(s)/deciliter  dextrose Oral Gel 15 Gram(s) Oral once PRN Blood Glucose LESS THAN 70 milliGRAM(s)/deciliter      Physical Exam  GENERAL: NAD, lying in bed comfortably  HEAD:  Atraumatic, normocephalic  EYES: EOMI, PERRLA, conjunctiva and sclera clear  NECK: Supple, trachea midline  HEART: Regular rate and rhythm  LUNGS: Unlabored respirations.  Clear to auscultation bilaterally  ABDOMEN: Soft, nontender, nondistended,  EXTREMITIES: 2+ peripheral pulses bilaterally. No clubbing, cyanosis, or edema  NERVOUS SYSTEM:  A&Ox3, moving all extremities, weakness in the left shoulder is improving  SKIN: No rashes or lesions    I&O's Summary    13 Jul 2023 07:01  -  14 Jul 2023 07:00  --------------------------------------------------------  IN: 0 mL / OUT: 1000 mL / NET: -1000 mL      LABS:                         12.7   17.56 )-----------( 231      ( 14 Jul 2023 06:50 )             39.3     07-14    139  |  105  |  53<H>  ----------------------------<  248<H>  4.6   |  25  |  1.58<H>    Ca    9.5      14 Jul 2023 06:50  Phos  3.0     07-14  Mg     2.0     07-14    TPro  7.5  /  Alb  3.5  /  TBili  0.2  /  DBili  x   /  AST  20  /  ALT  12  /  AlkPhos  196<H>  07-13    PT/INR - ( 14 Jul 2023 06:50 )   PT: 12.6 sec;   INR: 1.06          PTT - ( 14 Jul 2023 06:50 )  PTT:26.5 sec  Urinalysis Basic - ( 14 Jul 2023 06:50 )    Color: x / Appearance: x / SG: x / pH: x  Gluc: 248 mg/dL / Ketone: x  / Bili: x / Urobili: x   Blood: x / Protein: x / Nitrite: x   Leuk Esterase: x / RBC: x / WBC x   Sq Epi: x / Non Sq Epi: x / Bacteria: x                RADIOLOGY, EKG & ADDITIONAL TESTS: Reviewed.                     INTERVAL HPI/OVERNIGHT EVENTS: Overnight the patients FSG >400 basal glargine insulin was increased 20->48 units. FSG at 2:00 AM was 355 was given lispro 6 units. Slept well, is NPO with meds for bronchoscopy today.     MEDICATIONS  (STANDING):  amLODIPine   Tablet 10 milliGRAM(s) Oral every 24 hours  dexAMETHasone     Tablet 4 milliGRAM(s) Oral every 6 hours  dextrose 5%. 1000 milliLiter(s) (100 mL/Hr) IV Continuous <Continuous>  dextrose 5%. 1000 milliLiter(s) (50 mL/Hr) IV Continuous <Continuous>  dextrose 50% Injectable 25 Gram(s) IV Push once  dextrose 50% Injectable 12.5 Gram(s) IV Push once  dextrose 50% Injectable 25 Gram(s) IV Push once  glucagon  Injectable 1 milliGRAM(s) IntraMuscular once  hydroxychloroquine 200 milliGRAM(s) Oral every 12 hours  ibuprofen  Tablet. 600 milliGRAM(s) Oral once  insulin glargine Injectable (LANTUS) 20 Unit(s) SubCutaneous at bedtime  insulin lispro (ADMELOG) corrective regimen sliding scale   SubCutaneous three times a day before meals  insulin lispro Injectable (ADMELOG) 6 Unit(s) SubCutaneous three times a day before meals  lidocaine   4% Patch 1 Patch Transdermal every 24 hours  losartan 100 milliGRAM(s) Oral every 24 hours  metoprolol succinate  milliGRAM(s) Oral every 24 hours  sulfaSALAzine 1000 milliGRAM(s) Oral every 12 hours    MEDICATIONS  (PRN):  acetaminophen     Tablet .. 650 milliGRAM(s) Oral every 6 hours PRN Temp greater or equal to 38.5C (101.3F), Mild Pain (1 - 3), Moderate Pain (4 - 6)  dextrose Oral Gel 15 Gram(s) Oral once PRN Blood Glucose LESS THAN 70 milliGRAM(s)/deciliter  dextrose Oral Gel 15 Gram(s) Oral once PRN Blood Glucose LESS THAN 70 milliGRAM(s)/deciliter      Physical Exam  GENERAL: NAD, lying in bed comfortably  HEAD:  Atraumatic, normocephalic  EYES: EOMI, PERRLA, conjunctiva and sclera clear  NECK: Supple, trachea midline  HEART: Regular rate and rhythm  LUNGS: Unlabored respirations.  Clear to auscultation bilaterally  ABDOMEN: Soft, nontender, nondistended,  EXTREMITIES: 2+ peripheral pulses bilaterally. No clubbing, cyanosis, or edema  NERVOUS SYSTEM:  A&Ox3, moving all extremities, weakness in the left shoulder is improving  SKIN: No rashes or lesions    I&O's Summary    13 Jul 2023 07:01  -  14 Jul 2023 07:00  --------------------------------------------------------  IN: 0 mL / OUT: 1000 mL / NET: -1000 mL      LABS:                         12.7   17.56 )-----------( 231      ( 14 Jul 2023 06:50 )             39.3     07-14    139  |  105  |  53<H>  ----------------------------<  248<H>  4.6   |  25  |  1.58<H>    Ca    9.5      14 Jul 2023 06:50  Phos  3.0     07-14  Mg     2.0     07-14    TPro  7.5  /  Alb  3.5  /  TBili  0.2  /  DBili  x   /  AST  20  /  ALT  12  /  AlkPhos  196<H>  07-13    PT/INR - ( 14 Jul 2023 06:50 )   PT: 12.6 sec;   INR: 1.06          PTT - ( 14 Jul 2023 06:50 )  PTT:26.5 sec  Urinalysis Basic - ( 14 Jul 2023 06:50 )    Color: x / Appearance: x / SG: x / pH: x  Gluc: 248 mg/dL / Ketone: x  / Bili: x / Urobili: x   Blood: x / Protein: x / Nitrite: x   Leuk Esterase: x / RBC: x / WBC x   Sq Epi: x / Non Sq Epi: x / Bacteria: x                RADIOLOGY, EKG & ADDITIONAL TESTS: Reviewed.                     HOSPITAL COURSE: 73y Female with PMHx of HTN HLD, DM, R hip replacement, RA, CKD (Cr baseline ~2) presents to the  ED for unsteadiness and dizziness for 1 week. LKN was a week ago before admission 7/9/23. On admission the patient was mRS of 2, walks with a rolling walker but is able to care for her basic everyday needs. NIH of 5 for L facial and dysarthria and LUE weakness. 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 Daughter at bedside stated that she is at her baseline. LUE is pain limited to her rheumatoid arthritis but patient states that she definitely has noticed it to be slightly weaker than usual. Otherwise no numbness or tingling, no headache, N/V. CTH was completed in the ED concerning for age indeterminate L cerebellar lacunar infarcts.      7/9: lidocaine patch for left shoulder. Restarted BP meds. possible mass, dc DAPT. shoulder x-ray negative for fx.   7/10 Nsgy consulted and recommended MRI with contrast with malu protocol and malignancy work up.   7/11: No decadron per NSGY. CTAP showed lesions in the lungs, liver, and pancreas. Pending lung bx on Friday with Dr. Zavala.   7/12: decadron 10 mg IV once per NSGY. To taper 4 mg q6h to 2 mg BID over one week. Started glargine 8 units basal bolus at bedtime and lispro 2 units TID with meals.  7/13: liquid tumor sample collected. [facility] consulted to discuss evaluation for treatment. NPO after midnight pending bronhcoscopy. Patient with severely uncontrolled sugars due to decadron initiation, given corrective and increased bolus insulin. Basal insulin increased to 20 units, 48 units needed over 24 hours. Patient to be NPO tomorrow. Liquid tumor collection kit sent. PM glucose >400, will give PM correction with lantus.   O/N: FSG at 2am 355, given 6 units lispro, check fingerstick one hour after     INTERVAL HPI/OVERNIGHT EVENTS: Overnight the patients FSG >400 basal glargine insulin was increased 20->48 units. FSG at 2:00 AM was 355 was given lispro 6 units. Slept well, is NPO with meds for bronchoscopy today.     MEDICATIONS  (STANDING):  amLODIPine   Tablet 10 milliGRAM(s) Oral every 24 hours  dexAMETHasone     Tablet 4 milliGRAM(s) Oral every 6 hours  dextrose 5%. 1000 milliLiter(s) (100 mL/Hr) IV Continuous <Continuous>  dextrose 5%. 1000 milliLiter(s) (50 mL/Hr) IV Continuous <Continuous>  dextrose 50% Injectable 25 Gram(s) IV Push once  dextrose 50% Injectable 12.5 Gram(s) IV Push once  dextrose 50% Injectable 25 Gram(s) IV Push once  glucagon  Injectable 1 milliGRAM(s) IntraMuscular once  hydroxychloroquine 200 milliGRAM(s) Oral every 12 hours  ibuprofen  Tablet. 600 milliGRAM(s) Oral once  insulin glargine Injectable (LANTUS) 20 Unit(s) SubCutaneous at bedtime  insulin lispro (ADMELOG) corrective regimen sliding scale   SubCutaneous three times a day before meals  insulin lispro Injectable (ADMELOG) 6 Unit(s) SubCutaneous three times a day before meals  lidocaine   4% Patch 1 Patch Transdermal every 24 hours  losartan 100 milliGRAM(s) Oral every 24 hours  metoprolol succinate  milliGRAM(s) Oral every 24 hours  sulfaSALAzine 1000 milliGRAM(s) Oral every 12 hours    MEDICATIONS  (PRN):  acetaminophen     Tablet .. 650 milliGRAM(s) Oral every 6 hours PRN Temp greater or equal to 38.5C (101.3F), Mild Pain (1 - 3), Moderate Pain (4 - 6)  dextrose Oral Gel 15 Gram(s) Oral once PRN Blood Glucose LESS THAN 70 milliGRAM(s)/deciliter  dextrose Oral Gel 15 Gram(s) Oral once PRN Blood Glucose LESS THAN 70 milliGRAM(s)/deciliter      Physical Exam  GENERAL: NAD, lying in bed comfortably  HEAD:  Atraumatic, normocephalic  EYES: EOMI, PERRLA, conjunctiva and sclera clear  NECK: Supple, trachea midline  HEART: Regular rate and rhythm  LUNGS: Unlabored respirations.  Clear to auscultation bilaterally  ABDOMEN: Soft, nontender, nondistended,  EXTREMITIES: 2+ peripheral pulses bilaterally. No clubbing, cyanosis, or edema  NERVOUS SYSTEM:  A&Ox3, moving all extremities, weakness in the left shoulder is improving  SKIN: No rashes or lesions    I&O's Summary    13 Jul 2023 07:01  -  14 Jul 2023 07:00  --------------------------------------------------------  IN: 0 mL / OUT: 1000 mL / NET: -1000 mL      LABS:                         12.7   17.56 )-----------( 231      ( 14 Jul 2023 06:50 )             39.3     07-14    139  |  105  |  53<H>  ----------------------------<  248<H>  4.6   |  25  |  1.58<H>    Ca    9.5      14 Jul 2023 06:50  Phos  3.0     07-14  Mg     2.0     07-14    TPro  7.5  /  Alb  3.5  /  TBili  0.2  /  DBili  x   /  AST  20  /  ALT  12  /  AlkPhos  196<H>  07-13    PT/INR - ( 14 Jul 2023 06:50 )   PT: 12.6 sec;   INR: 1.06          PTT - ( 14 Jul 2023 06:50 )  PTT:26.5 sec  Urinalysis Basic - ( 14 Jul 2023 06:50 )    Color: x / Appearance: x / SG: x / pH: x  Gluc: 248 mg/dL / Ketone: x  / Bili: x / Urobili: x   Blood: x / Protein: x / Nitrite: x   Leuk Esterase: x / RBC: x / WBC x   Sq Epi: x / Non Sq Epi: x / Bacteria: x                RADIOLOGY, EKG & ADDITIONAL TESTS: Reviewed.                     HOSPITAL COURSE: 73y Female with PMHx of HTN HLD, DM, R hip replacement, RA, CKD (Cr baseline ~2) presents to the  ED for unsteadiness and dizziness for 1 week. LKN was a week ago before admission 7/9/23. On admission the patient was mRS of 2, walks with a rolling walker but is able to care for her basic everyday needs. NIH of 5 for L facial and dysarthria and LUE weakness. 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 Daughter at bedside stated that she is at her baseline. LUE is pain limited to her rheumatoid arthritis but patient states that she definitely has noticed it to be slightly weaker than usual. Otherwise no numbness or tingling, no headache, N/V. CTH was completed in the ED concerning for age indeterminate L cerebellar lacunar infarcts. MR head 7/9/23  showed an 8 mm cortically based nodule in the posterior-superior frontal lobe (motor strip) is present with extensive vasogenic edema. Neurosurgery was consulted and recommended MRI with contrast with malu protocol and malignancy work up. 7/11/23 CTAP showed lesions in the lungs, liver, and pancreas. Per neurosurgery decadron 10 mg IV once . To taper 4 mg q6h to 2 mg BID over one week. Started glargine 8 units basal bolus at bedtime and lispro 2 units TID with meals. 7/13/23 liquid tumor sample collected. [facility] consulted to discuss evaluation for treatment.  Patient with severely uncontrolled sugars due to decadron initiation, given corrective and increased bolus insulin. Basal insulin increased to 20 units, 48 units needed over 24 hours. Patient to be NPO tomorrow. Liquid tumor collection kit sent. PM glucose >400, will give PM correction with lantus. Overnight FSG at 2am 355, given 6 units lispro, check fingerstick one hour after. 7/14/23 s/p  bronchoscopy with lung biopsy on Friday with Dr. Zavala.     INTERVAL HPI/OVERNIGHT EVENTS: Overnight the patients FSG >400 basal glargine insulin was increased 20->48 units. FSG at 2:00 AM was 355 was given lispro 6 units. Slept well, is NPO with meds for bronchoscopy today.     MEDICATIONS  (STANDING):  amLODIPine   Tablet 10 milliGRAM(s) Oral every 24 hours  dexAMETHasone     Tablet 4 milliGRAM(s) Oral every 6 hours  dextrose 5%. 1000 milliLiter(s) (100 mL/Hr) IV Continuous <Continuous>  dextrose 5%. 1000 milliLiter(s) (50 mL/Hr) IV Continuous <Continuous>  dextrose 50% Injectable 25 Gram(s) IV Push once  dextrose 50% Injectable 12.5 Gram(s) IV Push once  dextrose 50% Injectable 25 Gram(s) IV Push once  glucagon  Injectable 1 milliGRAM(s) IntraMuscular once  hydroxychloroquine 200 milliGRAM(s) Oral every 12 hours  ibuprofen  Tablet. 600 milliGRAM(s) Oral once  insulin glargine Injectable (LANTUS) 20 Unit(s) SubCutaneous at bedtime  insulin lispro (ADMELOG) corrective regimen sliding scale   SubCutaneous three times a day before meals  insulin lispro Injectable (ADMELOG) 6 Unit(s) SubCutaneous three times a day before meals  lidocaine   4% Patch 1 Patch Transdermal every 24 hours  losartan 100 milliGRAM(s) Oral every 24 hours  metoprolol succinate  milliGRAM(s) Oral every 24 hours  sulfaSALAzine 1000 milliGRAM(s) Oral every 12 hours    MEDICATIONS  (PRN):  acetaminophen     Tablet .. 650 milliGRAM(s) Oral every 6 hours PRN Temp greater or equal to 38.5C (101.3F), Mild Pain (1 - 3), Moderate Pain (4 - 6)  dextrose Oral Gel 15 Gram(s) Oral once PRN Blood Glucose LESS THAN 70 milliGRAM(s)/deciliter  dextrose Oral Gel 15 Gram(s) Oral once PRN Blood Glucose LESS THAN 70 milliGRAM(s)/deciliter      Physical Exam  GENERAL: NAD, lying in bed comfortably  HEAD:  Atraumatic, normocephalic  EYES: EOMI, PERRLA, conjunctiva and sclera clear  NECK: Supple, trachea midline  HEART: Regular rate and rhythm  LUNGS: Unlabored respirations.  Clear to auscultation bilaterally  ABDOMEN: Soft, nontender, nondistended,  EXTREMITIES: 2+ peripheral pulses bilaterally. No clubbing, cyanosis, or edema  NERVOUS SYSTEM:  A&Ox3, moving all extremities, weakness in the left shoulder is improving  SKIN: No rashes or lesions    I&O's Summary    13 Jul 2023 07:01 - 14 Jul 2023 07:00  --------------------------------------------------------  IN: 0 mL / OUT: 1000 mL / NET: -1000 mL      LABS:                         12.7   17.56 )-----------( 231      ( 14 Jul 2023 06:50 )             39.3     07-14    139  |  105  |  53<H>  ----------------------------<  248<H>  4.6   |  25  |  1.58<H>    Ca    9.5      14 Jul 2023 06:50  Phos  3.0     07-14  Mg     2.0     07-14    TPro  7.5  /  Alb  3.5  /  TBili  0.2  /  DBili  x   /  AST  20  /  ALT  12  /  AlkPhos  196<H>  07-13    PT/INR - ( 14 Jul 2023 06:50 )   PT: 12.6 sec;   INR: 1.06          PTT - ( 14 Jul 2023 06:50 )  PTT:26.5 sec  Urinalysis Basic - ( 14 Jul 2023 06:50 )    Color: x / Appearance: x / SG: x / pH: x  Gluc: 248 mg/dL / Ketone: x  / Bili: x / Urobili: x   Blood: x / Protein: x / Nitrite: x   Leuk Esterase: x / RBC: x / WBC x   Sq Epi: x / Non Sq Epi: x / Bacteria: x                RADIOLOGY, EKG & ADDITIONAL TESTS: Reviewed.

## 2023-07-14 NOTE — PRE-ANESTHESIA EVALUATION ADULT - NSANTHPMHFT_GEN_ALL_CORE
Additional PMHx: DJD Right Hip,                    newly discovered nodule posterior superior frontal lobe (differential Mets vs Atypical Infection)                 newly  discovered Lingula Nodule    Additional PSxHx: Colonoscopies

## 2023-07-14 NOTE — PROGRESS NOTE ADULT - PROBLEM SELECTOR PLAN 3
- A1C results: 7.4   - glargine 8 units basal bolus and lispro 2 units TID with meals. - A1C results: 7.4   - insulin glargine Injectable (LANTUS) 20 Unit(s) SubCutaneous at bedtime  - insulin lispro Injectable (ADMELOG) 6 Unit(s) SubCutaneous three times a day before meals  - insulin lispro (ADMELOG) corrective regimen sliding scale   SubCutaneous three times a day before meals

## 2023-07-14 NOTE — PROGRESS NOTE ADULT - TIME BILLING
Patient seen and examined;  Spoke with patients daughter;   Will likely need acute rehab   Also RT but if in rehab the RT may be delayed  Lung biopsy today

## 2023-07-14 NOTE — PROGRESS NOTE ADULT - SUBJECTIVE AND OBJECTIVE BOX
INTERVAL HPI/OVERNIGHT EVENTS:  Interim reviewed;  RT to [facility] patient today  For lung biopsy today  Remains on steroids  Glucoses increased;       MEDICATIONS  (STANDING):  amLODIPine   Tablet 10 milliGRAM(s) Oral every 24 hours  dexAMETHasone     Tablet 4 milliGRAM(s) Oral every 6 hours  dextrose 5%. 1000 milliLiter(s) (50 mL/Hr) IV Continuous <Continuous>  dextrose 5%. 1000 milliLiter(s) (100 mL/Hr) IV Continuous <Continuous>  dextrose 50% Injectable 25 Gram(s) IV Push once  dextrose 50% Injectable 12.5 Gram(s) IV Push once  dextrose 50% Injectable 25 Gram(s) IV Push once  glucagon  Injectable 1 milliGRAM(s) IntraMuscular once  hydroxychloroquine 200 milliGRAM(s) Oral every 12 hours  ibuprofen  Tablet. 600 milliGRAM(s) Oral once  insulin glargine Injectable (LANTUS) 20 Unit(s) SubCutaneous at bedtime  insulin lispro (ADMELOG) corrective regimen sliding scale   SubCutaneous three times a day before meals  insulin lispro Injectable (ADMELOG) 6 Unit(s) SubCutaneous three times a day before meals  lidocaine   4% Patch 1 Patch Transdermal every 24 hours  losartan 100 milliGRAM(s) Oral every 24 hours  metoprolol succinate  milliGRAM(s) Oral every 24 hours  sulfaSALAzine 1000 milliGRAM(s) Oral every 12 hours    MEDICATIONS  (PRN):  acetaminophen     Tablet .. 650 milliGRAM(s) Oral every 6 hours PRN Temp greater or equal to 38.5C (101.3F), Mild Pain (1 - 3), Moderate Pain (4 - 6)  dextrose Oral Gel 15 Gram(s) Oral once PRN Blood Glucose LESS THAN 70 milliGRAM(s)/deciliter  dextrose Oral Gel 15 Gram(s) Oral once PRN Blood Glucose LESS THAN 70 milliGRAM(s)/deciliter      Allergies    No Known Allergies    Intolerances        Vital Signs Last 24 Hrs  T(C): 36.3 (14 Jul 2023 06:17), Max: 37.1 (13 Jul 2023 21:23)  T(F): 97.4 (14 Jul 2023 06:17), Max: 98.8 (13 Jul 2023 21:23)  HR: 69 (14 Jul 2023 06:17) (67 - 82)  BP: 130/75 (14 Jul 2023 06:17) (111/74 - 130/75)  BP(mean): --  RR: 18 (14 Jul 2023 06:17) (16 - 18)  SpO2: 94% (14 Jul 2023 06:17) (94% - 95%)    Parameters below as of 14 Jul 2023 06:17  Patient On (Oxygen Delivery Method): room air              Constitutional:  Awake     Eyes: NEDRA    ENMT: Negative    Neck: Supple    Back:  no tenderness     Respiratory:  clear    Cardiovascular: S1 S2    Gastrointestinal:  soft     Genitourinary:    Extremities:  no edema     Vascular:    Neurological:    Skin:    Lymph Nodes:            07-13 @ 07:01  -  07-14 @ 07:00  --------------------------------------------------------  IN: 0 mL / OUT: 1000 mL / NET: -1000 mL      LABS:                        12.7   17.56 )-----------( 231      ( 14 Jul 2023 06:50 )             39.3     07-14    139  |  105  |  53<H>  ----------------------------<  248<H>  4.6   |  25  |  1.58<H>    Ca    9.5      14 Jul 2023 06:50  Phos  3.0     07-14  Mg     2.0     07-14    TPro  7.5  /  Alb  3.5  /  TBili  0.2  /  DBili  x   /  AST  20  /  ALT  12  /  AlkPhos  196<H>  07-13    PT/INR - ( 14 Jul 2023 06:50 )   PT: 12.6 sec;   INR: 1.06          PTT - ( 14 Jul 2023 06:50 )  PTT:26.5 sec  Urinalysis Basic - ( 14 Jul 2023 06:50 )    Color: x / Appearance: x / SG: x / pH: x  Gluc: 248 mg/dL / Ketone: x  / Bili: x / Urobili: x   Blood: x / Protein: x / Nitrite: x   Leuk Esterase: x / RBC: x / WBC x   Sq Epi: x / Non Sq Epi: x / Bacteria: x        RADIOLOGY & ADDITIONAL TESTS:

## 2023-07-15 LAB
GLUCOSE BLDC GLUCOMTR-MCNC: 184 MG/DL — HIGH (ref 70–99)
GLUCOSE BLDC GLUCOMTR-MCNC: 214 MG/DL — HIGH (ref 70–99)
GLUCOSE BLDC GLUCOMTR-MCNC: 219 MG/DL — HIGH (ref 70–99)
GLUCOSE BLDC GLUCOMTR-MCNC: 229 MG/DL — HIGH (ref 70–99)

## 2023-07-15 PROCEDURE — 99232 SBSQ HOSP IP/OBS MODERATE 35: CPT

## 2023-07-15 RX ADMIN — Medication 200 MILLIGRAM(S): at 06:38

## 2023-07-15 RX ADMIN — Medication 4 MILLIGRAM(S): at 08:49

## 2023-07-15 RX ADMIN — Medication 1000 MILLIGRAM(S): at 06:38

## 2023-07-15 RX ADMIN — Medication 200 MILLIGRAM(S): at 18:11

## 2023-07-15 RX ADMIN — Medication 10 UNIT(S): at 09:21

## 2023-07-15 RX ADMIN — Medication 2: at 09:21

## 2023-07-15 RX ADMIN — Medication 1000 MILLIGRAM(S): at 18:11

## 2023-07-15 RX ADMIN — Medication 4 MILLIGRAM(S): at 14:16

## 2023-07-15 RX ADMIN — LIDOCAINE 1 PATCH: 4 CREAM TOPICAL at 19:05

## 2023-07-15 RX ADMIN — INSULIN GLARGINE 16 UNIT(S): 100 INJECTION, SOLUTION SUBCUTANEOUS at 22:13

## 2023-07-15 RX ADMIN — LIDOCAINE 1 PATCH: 4 CREAM TOPICAL at 08:48

## 2023-07-15 RX ADMIN — Medication 100 MILLIGRAM(S): at 08:49

## 2023-07-15 RX ADMIN — LIDOCAINE 1 PATCH: 4 CREAM TOPICAL at 20:38

## 2023-07-15 RX ADMIN — Medication 4 MILLIGRAM(S): at 03:05

## 2023-07-15 RX ADMIN — AMLODIPINE BESYLATE 10 MILLIGRAM(S): 2.5 TABLET ORAL at 06:37

## 2023-07-15 RX ADMIN — Medication 10 UNIT(S): at 13:08

## 2023-07-15 RX ADMIN — LOSARTAN POTASSIUM 100 MILLIGRAM(S): 100 TABLET, FILM COATED ORAL at 14:16

## 2023-07-15 RX ADMIN — Medication 10 UNIT(S): at 18:06

## 2023-07-15 RX ADMIN — Medication 4 MILLIGRAM(S): at 22:12

## 2023-07-15 RX ADMIN — Medication 4: at 18:06

## 2023-07-15 RX ADMIN — Medication 4: at 13:08

## 2023-07-15 NOTE — PROGRESS NOTE ADULT - PROBLEM SELECTOR PLAN 1
Workup for falls with potential brain mass etiology.   - MRA H/N without large vessel occlusion or HGS   - MRI w/o contrast: An 8 mm cortically based nodule in the posterior-superior frontal lobe (motor strip) is present with extensive vasogenic edema. Metastasis or focus of atypical infection are leading differential considerations  - ASA, Plavix, and Atorvastatin discontinued to prevent ICH  - q6hr neuro checks and vitals  - neurosurgery recs appreciated: decadron 10 mg IV once, decadron 4 mg q6h taper to 2 mg BID over one week, discharge on 2 mg BID  - s/p bronchoscopy 7/14/23    - pending MRI w/ contrast w/ [place]  - pending CT chest/abdomen/pelvis to r/o malignancy  - if altered/concerning, consider seizure given vasogenic edema  - eventual outpatient neurology follow up    - Stroke Code HCT Results: age indeterminate L cerebellar lacunar infarcts  - Stroke Code CTA Results: deferred due to CKD  - Stroke education.

## 2023-07-15 NOTE — PROGRESS NOTE ADULT - PROBLEM SELECTOR PLAN 3
- A1C results: 7.4   - insulin glargine Injectable (LANTUS) 20 Unit(s) SubCutaneous at bedtime  - insulin lispro Injectable (ADMELOG) 6 Unit(s) SubCutaneous three times a day before meals  - insulin lispro (ADMELOG) corrective regimen sliding scale   SubCutaneous three times a day before meals

## 2023-07-15 NOTE — PROGRESS NOTE ADULT - SUBJECTIVE AND OBJECTIVE BOX
Interval Events: Reviewed  Patient seen and examined at bedside.    Patient is a 73y old  Female who presents with a chief complaint of Stroke (14 Jul 2023 14:17)  no acute event overnight, RA 96%      PAST MEDICAL & SURGICAL HISTORY:  HTN (hypertension)      HLD (hyperlipidemia)      DM (diabetes mellitus), type 2      Rheumatoid arthritis      Stage 4 chronic kidney disease      Degenerative joint disease (DJD) of lumbar spine      Osteopenia      S/P total right hip arthroplasty          MEDICATIONS:  Pulmonary:    Antimicrobials:  hydroxychloroquine 200 milliGRAM(s) Oral every 12 hours    Anticoagulants:    Cardiac:  amLODIPine   Tablet 10 milliGRAM(s) Oral every 24 hours  losartan 100 milliGRAM(s) Oral every 24 hours  metoprolol succinate  milliGRAM(s) Oral every 24 hours      Allergies    No Known Allergies    Intolerances        Vital Signs Last 24 Hrs  T(C): 36.4 (15 Jul 2023 06:34), Max: 36.6 (14 Jul 2023 13:27)  T(F): 97.6 (15 Jul 2023 06:34), Max: 97.8 (14 Jul 2023 13:27)  HR: 62 (15 Jul 2023 06:34) (57 - 70)  BP: 146/79 (15 Jul 2023 06:34) (129/75 - 153/91)  BP(mean): 94 (14 Jul 2023 16:36) (94 - 94)  RR: 18 (15 Jul 2023 06:34) (18 - 18)  SpO2: 96% (15 Jul 2023 06:34) (95% - 96%)    Parameters below as of 15 Jul 2023 06:34  Patient On (Oxygen Delivery Method): room air            Review of Systems:   •	General: negative  •	Skin/Breast: negative  •	Ophthalmologic: negative  •	ENMT: negative  •	Respiratory and Thorax: negative  •	Cardiovascular: negative  •	Gastrointestinal: negative  •	Genitourinary: negative  •	Musculoskeletal: negative  •	Neurological: negative  •	Psychiatric: negative  •	Hematology/Lymphatics: negative  •	Endocrine: negative  •	Allergic/Immunologic: negative    Physical Exam:   • Constitutional:	elderly female, NAD   • Eyes:	EOMI; PERRL; no drainage or redness  • ENMT:	No oral lesions; no gross abnormalities  • Neck	no thyromegaly or nodules  • Breasts:	not examined  • Back:	No deformity or limitation of movement  • Respiratory:	Breath Sounds equal & clear to auscultation, no accessory muscle use  • Cardiovascular:	Regular rate & rhythm, normal S1, S2; no murmurs, gallops or rubs; no S3, S4  • Gastrointestinal:	Soft, non-tender, no hepatosplenomegaly, normal bowel sounds  • Genitourinary:	not examined  • Rectal: not examined  • Extremities:	No cyanosis, clubbing or edema  • Vascular:	Equal and normal pulses (dorsalis pedis)  • Neurologica:l	not examined  • Skin:	No lesions; no rash  • Lymph Nodes:	No lymphadedenopathy  • Musculoskeletal:	No joint pain, swelling or deformity; no limitation of movement        LABS:      CBC Full  -  ( 14 Jul 2023 06:50 )  WBC Count : 17.56 K/uL  RBC Count : 4.19 M/uL  Hemoglobin : 12.7 g/dL  Hematocrit : 39.3 %  Platelet Count - Automated : 231 K/uL  Mean Cell Volume : 93.8 fl  Mean Cell Hemoglobin : 30.3 pg  Mean Cell Hemoglobin Concentration : 32.3 gm/dL  Auto Neutrophil # : x  Auto Lymphocyte # : x  Auto Monocyte # : x  Auto Eosinophil # : x  Auto Basophil # : x  Auto Neutrophil % : x  Auto Lymphocyte % : x  Auto Monocyte % : x  Auto Eosinophil % : x  Auto Basophil % : x    07-14    139  |  105  |  53<H>  ----------------------------<  248<H>  4.6   |  25  |  1.58<H>    Ca    9.5      14 Jul 2023 06:50  Phos  3.0     07-14  Mg     2.0     07-14      PT/INR - ( 14 Jul 2023 06:50 )   PT: 12.6 sec;   INR: 1.06          PTT - ( 14 Jul 2023 06:50 )  PTT:26.5 sec      Urinalysis Basic - ( 14 Jul 2023 06:50 )    Color: x / Appearance: x / SG: x / pH: x  Gluc: 248 mg/dL / Ketone: x  / Bili: x / Urobili: x   Blood: x / Protein: x / Nitrite: x   Leuk Esterase: x / RBC: x / WBC x   Sq Epi: x / Non Sq Epi: x / Bacteria: x                  RADIOLOGY & ADDITIONAL STUDIES (The following images were personally reviewed):  Salazar:                                     No  Urine output:                       adequate  DVT prophylaxis:                 Yes  Flattus:                                  Yes  Bowel movement:              No

## 2023-07-16 LAB
ANION GAP SERPL CALC-SCNC: 10 MMOL/L — SIGNIFICANT CHANGE UP (ref 5–17)
BUN SERPL-MCNC: 49 MG/DL — HIGH (ref 7–23)
CALCIUM SERPL-MCNC: 8.1 MG/DL — LOW (ref 8.4–10.5)
CHLORIDE SERPL-SCNC: 103 MMOL/L — SIGNIFICANT CHANGE UP (ref 96–108)
CO2 SERPL-SCNC: 20 MMOL/L — LOW (ref 22–31)
CREAT SERPL-MCNC: 1.46 MG/DL — HIGH (ref 0.5–1.3)
EGFR: 38 ML/MIN/1.73M2 — LOW
GLUCOSE BLDC GLUCOMTR-MCNC: 122 MG/DL — HIGH (ref 70–99)
GLUCOSE BLDC GLUCOMTR-MCNC: 213 MG/DL — HIGH (ref 70–99)
GLUCOSE BLDC GLUCOMTR-MCNC: 229 MG/DL — HIGH (ref 70–99)
GLUCOSE BLDC GLUCOMTR-MCNC: 230 MG/DL — HIGH (ref 70–99)
GLUCOSE SERPL-MCNC: 233 MG/DL — HIGH (ref 70–99)
HCT VFR BLD CALC: 38.5 % — SIGNIFICANT CHANGE UP (ref 34.5–45)
HGB BLD-MCNC: 12.2 G/DL — SIGNIFICANT CHANGE UP (ref 11.5–15.5)
MAGNESIUM SERPL-MCNC: 1.9 MG/DL — SIGNIFICANT CHANGE UP (ref 1.6–2.6)
MCHC RBC-ENTMCNC: 30.3 PG — SIGNIFICANT CHANGE UP (ref 27–34)
MCHC RBC-ENTMCNC: 31.7 GM/DL — LOW (ref 32–36)
MCV RBC AUTO: 95.8 FL — SIGNIFICANT CHANGE UP (ref 80–100)
NRBC # BLD: 0 /100 WBCS — SIGNIFICANT CHANGE UP (ref 0–0)
PHOSPHATE SERPL-MCNC: 2.9 MG/DL — SIGNIFICANT CHANGE UP (ref 2.5–4.5)
PLATELET # BLD AUTO: 191 K/UL — SIGNIFICANT CHANGE UP (ref 150–400)
POTASSIUM SERPL-MCNC: 4.8 MMOL/L — SIGNIFICANT CHANGE UP (ref 3.5–5.3)
POTASSIUM SERPL-SCNC: 4.8 MMOL/L — SIGNIFICANT CHANGE UP (ref 3.5–5.3)
RBC # BLD: 4.02 M/UL — SIGNIFICANT CHANGE UP (ref 3.8–5.2)
RBC # FLD: 12.9 % — SIGNIFICANT CHANGE UP (ref 10.3–14.5)
SODIUM SERPL-SCNC: 133 MMOL/L — LOW (ref 135–145)
WBC # BLD: 13.78 K/UL — HIGH (ref 3.8–10.5)
WBC # FLD AUTO: 13.78 K/UL — HIGH (ref 3.8–10.5)

## 2023-07-16 PROCEDURE — 99232 SBSQ HOSP IP/OBS MODERATE 35: CPT

## 2023-07-16 RX ORDER — DEXAMETHASONE 0.5 MG/5ML
4 ELIXIR ORAL EVERY 12 HOURS
Refills: 0 | Status: COMPLETED | OUTPATIENT
Start: 2023-07-16 | End: 2023-07-18

## 2023-07-16 RX ADMIN — Medication 10 UNIT(S): at 13:13

## 2023-07-16 RX ADMIN — Medication 650 MILLIGRAM(S): at 20:33

## 2023-07-16 RX ADMIN — Medication 200 MILLIGRAM(S): at 06:03

## 2023-07-16 RX ADMIN — LIDOCAINE 1 PATCH: 4 CREAM TOPICAL at 18:18

## 2023-07-16 RX ADMIN — Medication 10 UNIT(S): at 18:05

## 2023-07-16 RX ADMIN — Medication 4 MILLIGRAM(S): at 17:27

## 2023-07-16 RX ADMIN — AMLODIPINE BESYLATE 10 MILLIGRAM(S): 2.5 TABLET ORAL at 06:03

## 2023-07-16 RX ADMIN — Medication 1000 MILLIGRAM(S): at 17:26

## 2023-07-16 RX ADMIN — LIDOCAINE 1 PATCH: 4 CREAM TOPICAL at 07:49

## 2023-07-16 RX ADMIN — Medication 10 UNIT(S): at 09:31

## 2023-07-16 RX ADMIN — Medication 650 MILLIGRAM(S): at 19:33

## 2023-07-16 RX ADMIN — Medication 4: at 09:31

## 2023-07-16 RX ADMIN — Medication 100 MILLIGRAM(S): at 09:33

## 2023-07-16 RX ADMIN — Medication 4 MILLIGRAM(S): at 03:24

## 2023-07-16 RX ADMIN — Medication 200 MILLIGRAM(S): at 17:26

## 2023-07-16 RX ADMIN — LOSARTAN POTASSIUM 100 MILLIGRAM(S): 100 TABLET, FILM COATED ORAL at 13:39

## 2023-07-16 RX ADMIN — Medication 4: at 13:13

## 2023-07-16 RX ADMIN — INSULIN GLARGINE 16 UNIT(S): 100 INJECTION, SOLUTION SUBCUTANEOUS at 22:12

## 2023-07-16 RX ADMIN — Medication 1000 MILLIGRAM(S): at 06:03

## 2023-07-16 NOTE — PROGRESS NOTE ADULT - PROBLEM SELECTOR PLAN 1
Workup for falls with potential brain mass etiology.   - MRA H/N without large vessel occlusion or HGS   - MRI w/o contrast: An 8 mm cortically based nodule in the posterior-superior frontal lobe (motor strip) is present with extensive vasogenic edema. Metastasis or focus of atypical infection are leading differential considerations  - ASA, Plavix, and Atorvastatin discontinued to prevent ICH  - q6hr neuro checks and vitals  - neurosurgery recs appreciated: decadron 10 mg IV once, decadron 4 mg q6h taper to 2 mg BID over one week, discharge on 2 mg BID  - s/p bronchoscopy 7/14/23    - pending MRI w/ contrast w/ [place]  - pending CT chest/abdomen/pelvis to r/o malignancy  - if altered/concerning, consider seizure given vasogenic edema  - eventual outpatient neurology follow up    - Stroke Code HCT Results: age indeterminate L cerebellar lacunar infarcts  - Stroke Code CTA Results: deferred due to CKD  - Stroke education.

## 2023-07-16 NOTE — PROGRESS NOTE ADULT - SUBJECTIVE AND OBJECTIVE BOX
Overnight: No overnight events    Subjective: Pt was seen and examined at bedside. Pt feels well and denies any SOB, CP, or abdominal pain.    T(C): 37.1 (07-16-23 @ 12:18), Max: 37.1 (07-16-23 @ 12:18)  T(F): 98.7 (07-16-23 @ 12:18), Max: 98.7 (07-16-23 @ 12:18)  HR: 63 (07-16-23 @ 13:38) (58 - 67)  BP: 110/74 (07-16-23 @ 13:38) (110/73 - 133/74)  ABP: --  ABP(mean): --  RR: 17 (07-16-23 @ 12:18) (17 - 18)  SpO2: 98% (07-16-23 @ 12:18) (96% - 98%)    Physical exam  GENERAL: NAD, lying in bed comfortably  HEAD:  Atraumatic, normocephalic  EYES: EOMI, PERRLA, conjunctiva and sclera clear  NECK: Supple, trachea midline, no JVD  HEART: Regular rate and rhythm, no murmurs, rubs, or gallops  LUNGS: Unlabored respirations.  Clear to auscultation bilaterally, no crackles, wheezing, or rhonchi  ABDOMEN: Soft, nontender, nondistended, +BS  EXTREMITIES: 2+ peripheral pulses bilaterally. No clubbing, cyanosis, or edema  NERVOUS SYSTEM:  A&Ox3, moving all extremities, no focal deficits   SKIN: No rashes or lesions

## 2023-07-16 NOTE — PROGRESS NOTE ADULT - PROBLEM SELECTOR PLAN 1
Workup for falls with potential brain mass etiology.   - MRA H/N without large vessel occlusion or HGS   - MRI w/o contrast: An 8 mm cortically based nodule in the posterior-superior frontal lobe (motor strip) is present with extensive vasogenic edema. Metastasis or focus of atypical infection are leading differential considerations  - ASA, Plavix, and Atorvastatin discontinued to prevent ICH  - q6hr neuro checks and vitals  - neurosurgery recs appreciated: decadron 10 mg IV once, decadron 4 mg q6h taper to 2 mg BID over one week, discharge on 2 mg BID  - s/p bronchoscopy 7/14/23    - pending MRI w/ contrast w/ [place]  - pending CT chest/abdomen/pelvis to r/o malignancy  - if altered/concerning, consider seizure given vasogenic edema  - eventual outpatient neurology follow up    - Stroke Code HCT Results: age indeterminate L cerebellar lacunar infarcts  - Stroke Code CTA Results: deferred due to CKD  - Stroke education.

## 2023-07-16 NOTE — PROGRESS NOTE ADULT - SUBJECTIVE AND OBJECTIVE BOX
Interval Events: Reviewed  Patient seen and examined at bedside.    Patient is a 73y old  Female who presents with a chief complaint of Stroke (15 Jul 2023 08:20)  no acute event overnight, RA 96%      PAST MEDICAL & SURGICAL HISTORY:  HTN (hypertension)      HLD (hyperlipidemia)      DM (diabetes mellitus), type 2      Rheumatoid arthritis      Stage 4 chronic kidney disease      Degenerative joint disease (DJD) of lumbar spine      Osteopenia      S/P total right hip arthroplasty          MEDICATIONS:  Pulmonary:    Antimicrobials:  hydroxychloroquine 200 milliGRAM(s) Oral every 12 hours    Anticoagulants:    Cardiac:  amLODIPine   Tablet 10 milliGRAM(s) Oral every 24 hours  losartan 100 milliGRAM(s) Oral every 24 hours  metoprolol succinate  milliGRAM(s) Oral every 24 hours      Allergies    No Known Allergies    Intolerances        Vital Signs Last 24 Hrs  T(C): 36.4 (16 Jul 2023 05:10), Max: 36.7 (15 Jul 2023 20:36)  T(F): 97.5 (16 Jul 2023 05:10), Max: 98 (15 Jul 2023 20:36)  HR: 58 (16 Jul 2023 05:10) (58 - 70)  BP: 116/78 (16 Jul 2023 05:10) (116/78 - 146/87)  BP(mean): 90 (16 Jul 2023 05:10) (90 - 90)  RR: 18 (16 Jul 2023 05:10) (18 - 18)  SpO2: 97% (16 Jul 2023 05:10) (96% - 97%)    Parameters below as of 16 Jul 2023 05:10  Patient On (Oxygen Delivery Method): room air        07-15 @ 07:01  -  07-16 @ 07:00  --------------------------------------------------------  IN: 0 mL / OUT: 850 mL / NET: -850 mL          Review of Systems:   •	General: negative  •	Skin/Breast: negative  •	Ophthalmologic: negative  •	ENMT: negative  •	Respiratory and Thorax: negative  •	Cardiovascular: negative  •	Gastrointestinal: negative  •	Genitourinary: negative  •	Musculoskeletal: negative  •	Neurological: negative  •	Psychiatric: negative  •	Hematology/Lymphatics: negative  •	Endocrine: negative  •	Allergic/Immunologic: negative    Physical Exam:   • Constitutional:	elderly female, NAD   • Eyes:	EOMI; PERRL; no drainage or redness  • ENMT:	No oral lesions; no gross abnormalities  • Neck	no thyromegaly or nodules  • Breasts:	not examined  • Back:	No deformity or limitation of movement  • Respiratory:	Breath Sounds equal & clear to auscultation, no accessory muscle use  • Cardiovascular:	Regular rate & rhythm, normal S1, S2; no murmurs, gallops or rubs; no S3, S4  • Gastrointestinal:	Soft, non-tender, no hepatosplenomegaly, normal bowel sounds  • Genitourinary:	not examined  • Rectal: not examined  • Extremities:	No cyanosis, clubbing or edema, left arm weaker than right   • Vascular:	Equal and normal pulses (dorsalis pedis)  • Neurologica:l	not examined  • Skin:	No lesions; no rash  • Lymph Nodes:	No lymphadedenopathy  • Musculoskeletal:	No joint pain, swelling or deformity; no limitation of movement        LABS:                                  RADIOLOGY & ADDITIONAL STUDIES (The following images were personally reviewed):  Salazar:                                     No  Urine output:                       adequate  DVT prophylaxis:                 Yes  Flattus:                                  Yes  Bowel movement:              No

## 2023-07-16 NOTE — PROGRESS NOTE ADULT - ASSESSMENT
73y Female with PMHx of HTN HLD, DM, R hip replacement, RA, CKD (Cr baseline ~2) presents to the  ED for unsteadiness and dizziness x1 week. LKN was a week ago. mRS of 2, walks with a rolling walker but is able to care for her basic everyday needs. NIH of 5 for L facial and dysarthria and LUE weakness. 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 Daughter at bedside states that she is at her baseline. LUE is pain limited to her rheumatoid arthritis but patient states that she definitely has noticed it to be slightly weaker than usual. Otherwise no numbness or tingling, no headache, N/V. CTH was completed in the ED concerning for age indeterminate L cerebellar lacunar infarcts.

## 2023-07-17 LAB
ANION GAP SERPL CALC-SCNC: 12 MMOL/L — SIGNIFICANT CHANGE UP (ref 5–17)
BUN SERPL-MCNC: 53 MG/DL — HIGH (ref 7–23)
CALCIUM SERPL-MCNC: 7.9 MG/DL — LOW (ref 8.4–10.5)
CHLORIDE SERPL-SCNC: 104 MMOL/L — SIGNIFICANT CHANGE UP (ref 96–108)
CO2 SERPL-SCNC: 20 MMOL/L — LOW (ref 22–31)
CREAT SERPL-MCNC: 1.45 MG/DL — HIGH (ref 0.5–1.3)
EGFR: 38 ML/MIN/1.73M2 — LOW
GLUCOSE BLDC GLUCOMTR-MCNC: 243 MG/DL — HIGH (ref 70–99)
GLUCOSE BLDC GLUCOMTR-MCNC: 256 MG/DL — HIGH (ref 70–99)
GLUCOSE BLDC GLUCOMTR-MCNC: 299 MG/DL — HIGH (ref 70–99)
GLUCOSE BLDC GLUCOMTR-MCNC: 332 MG/DL — HIGH (ref 70–99)
GLUCOSE SERPL-MCNC: 251 MG/DL — HIGH (ref 70–99)
HCT VFR BLD CALC: 38.3 % — SIGNIFICANT CHANGE UP (ref 34.5–45)
HGB BLD-MCNC: 12 G/DL — SIGNIFICANT CHANGE UP (ref 11.5–15.5)
MAGNESIUM SERPL-MCNC: 1.8 MG/DL — SIGNIFICANT CHANGE UP (ref 1.6–2.6)
MCHC RBC-ENTMCNC: 29.6 PG — SIGNIFICANT CHANGE UP (ref 27–34)
MCHC RBC-ENTMCNC: 31.3 GM/DL — LOW (ref 32–36)
MCV RBC AUTO: 94.6 FL — SIGNIFICANT CHANGE UP (ref 80–100)
NRBC # BLD: 0 /100 WBCS — SIGNIFICANT CHANGE UP (ref 0–0)
PHOSPHATE SERPL-MCNC: 2.4 MG/DL — LOW (ref 2.5–4.5)
PLATELET # BLD AUTO: 195 K/UL — SIGNIFICANT CHANGE UP (ref 150–400)
POTASSIUM SERPL-MCNC: 4.2 MMOL/L — SIGNIFICANT CHANGE UP (ref 3.5–5.3)
POTASSIUM SERPL-SCNC: 4.2 MMOL/L — SIGNIFICANT CHANGE UP (ref 3.5–5.3)
RBC # BLD: 4.05 M/UL — SIGNIFICANT CHANGE UP (ref 3.8–5.2)
RBC # FLD: 13.1 % — SIGNIFICANT CHANGE UP (ref 10.3–14.5)
SODIUM SERPL-SCNC: 136 MMOL/L — SIGNIFICANT CHANGE UP (ref 135–145)
WBC # BLD: 13.37 K/UL — HIGH (ref 3.8–10.5)
WBC # FLD AUTO: 13.37 K/UL — HIGH (ref 3.8–10.5)

## 2023-07-17 PROCEDURE — 99232 SBSQ HOSP IP/OBS MODERATE 35: CPT | Mod: GC

## 2023-07-17 RX ADMIN — Medication 10 UNIT(S): at 08:41

## 2023-07-17 RX ADMIN — Medication 200 MILLIGRAM(S): at 05:49

## 2023-07-17 RX ADMIN — Medication 10 UNIT(S): at 13:12

## 2023-07-17 RX ADMIN — Medication 1000 MILLIGRAM(S): at 17:53

## 2023-07-17 RX ADMIN — Medication 4 MILLIGRAM(S): at 05:50

## 2023-07-17 RX ADMIN — Medication 1000 MILLIGRAM(S): at 05:49

## 2023-07-17 RX ADMIN — Medication 6: at 18:15

## 2023-07-17 RX ADMIN — Medication 650 MILLIGRAM(S): at 23:07

## 2023-07-17 RX ADMIN — Medication 8: at 13:11

## 2023-07-17 RX ADMIN — LIDOCAINE 1 PATCH: 4 CREAM TOPICAL at 07:03

## 2023-07-17 RX ADMIN — Medication 4 MILLIGRAM(S): at 17:53

## 2023-07-17 RX ADMIN — Medication 650 MILLIGRAM(S): at 15:01

## 2023-07-17 RX ADMIN — INSULIN GLARGINE 16 UNIT(S): 100 INJECTION, SOLUTION SUBCUTANEOUS at 22:06

## 2023-07-17 RX ADMIN — Medication 62.5 MILLIMOLE(S): at 12:45

## 2023-07-17 RX ADMIN — LIDOCAINE 1 PATCH: 4 CREAM TOPICAL at 18:16

## 2023-07-17 RX ADMIN — Medication 6: at 22:07

## 2023-07-17 RX ADMIN — Medication 650 MILLIGRAM(S): at 14:01

## 2023-07-17 RX ADMIN — Medication 200 MILLIGRAM(S): at 17:57

## 2023-07-17 RX ADMIN — Medication 10 UNIT(S): at 18:16

## 2023-07-17 RX ADMIN — AMLODIPINE BESYLATE 10 MILLIGRAM(S): 2.5 TABLET ORAL at 05:49

## 2023-07-17 RX ADMIN — Medication 650 MILLIGRAM(S): at 22:07

## 2023-07-17 RX ADMIN — LIDOCAINE 1 PATCH: 4 CREAM TOPICAL at 14:08

## 2023-07-17 NOTE — PROGRESS NOTE ADULT - SUBJECTIVE AND OBJECTIVE BOX
INTERVAL HPI/OVERNIGHT EVENTS:  Awake and in good spirits  Path pending       MEDICATIONS  (STANDING):  amLODIPine   Tablet 10 milliGRAM(s) Oral every 24 hours  dexAMETHasone     Tablet 4 milliGRAM(s) Oral every 12 hours  dextrose 5%. 1000 milliLiter(s) (100 mL/Hr) IV Continuous <Continuous>  dextrose 5%. 1000 milliLiter(s) (50 mL/Hr) IV Continuous <Continuous>  dextrose 50% Injectable 25 Gram(s) IV Push once  dextrose 50% Injectable 12.5 Gram(s) IV Push once  dextrose 50% Injectable 25 Gram(s) IV Push once  glucagon  Injectable 1 milliGRAM(s) IntraMuscular once  hydroxychloroquine 200 milliGRAM(s) Oral every 12 hours  ibuprofen  Tablet. 600 milliGRAM(s) Oral once  insulin glargine Injectable (LANTUS) 16 Unit(s) SubCutaneous at bedtime  insulin lispro (ADMELOG) corrective regimen sliding scale   SubCutaneous Before meals and at bedtime  insulin lispro Injectable (ADMELOG) 10 Unit(s) SubCutaneous three times a day with meals  lidocaine   4% Patch 1 Patch Transdermal every 24 hours  losartan 100 milliGRAM(s) Oral every 24 hours  metoprolol succinate  milliGRAM(s) Oral every 24 hours  sulfaSALAzine 1000 milliGRAM(s) Oral every 12 hours    MEDICATIONS  (PRN):  acetaminophen     Tablet .. 650 milliGRAM(s) Oral every 6 hours PRN Temp greater or equal to 38.5C (101.3F), Mild Pain (1 - 3), Moderate Pain (4 - 6)  dextrose Oral Gel 15 Gram(s) Oral once PRN Blood Glucose LESS THAN 70 milliGRAM(s)/deciliter  dextrose Oral Gel 15 Gram(s) Oral once PRN Blood Glucose LESS THAN 70 milliGRAM(s)/deciliter      Allergies    No Known Allergies    Intolerances        Vital Signs Last 24 Hrs  T(C): 36.4 (17 Jul 2023 08:30), Max: 37.1 (16 Jul 2023 12:18)  T(F): 97.6 (17 Jul 2023 08:30), Max: 98.7 (16 Jul 2023 12:18)  HR: 50 (17 Jul 2023 08:30) (50 - 66)  BP: 132/75 (17 Jul 2023 08:30) (109/73 - 137/81)  BP(mean): --  RR: 17 (17 Jul 2023 08:30) (17 - 18)  SpO2: 95% (17 Jul 2023 08:30) (95% - 98%)    Parameters below as of 17 Jul 2023 08:30  Patient On (Oxygen Delivery Method): room air              Constitutional:  Awake     Eyes: NEDRA    ENMT: Negative    Neck: Supple    Back:  no tenderness     Respiratory:  clear    Cardiovascular: S1 S2    Gastrointestinal:  soft    Genitourinary:    Extremities: no edema     Vascular:    Neurological:    Skin:    Lymph Nodes:            07-16 @ 07:01  -  07-17 @ 07:00  --------------------------------------------------------  IN: 0 mL / OUT: 1000 mL / NET: -1000 mL      LABS:                        12.0   13.37 )-----------( 195      ( 17 Jul 2023 05:30 )             38.3     07-17    136  |  104  |  53<H>  ----------------------------<  251<H>  4.2   |  20<L>  |  1.45<H>    Ca    7.9<L>      17 Jul 2023 05:30  Phos  2.4     07-17  Mg     1.8     07-17        Urinalysis Basic - ( 17 Jul 2023 05:30 )    Color: x / Appearance: x / SG: x / pH: x  Gluc: 251 mg/dL / Ketone: x  / Bili: x / Urobili: x   Blood: x / Protein: x / Nitrite: x   Leuk Esterase: x / RBC: x / WBC x   Sq Epi: x / Non Sq Epi: x / Bacteria: x        RADIOLOGY & ADDITIONAL TESTS:

## 2023-07-17 NOTE — CHART NOTE - NSCHARTNOTEFT_GEN_A_CORE
Discussed plan with Hematology/Oncology team and Neurosurgery team who propose that the patient will be seen at interdisciplinary assessment on Tuesday, August 1st for further planning.

## 2023-07-17 NOTE — PROGRESS NOTE ADULT - PROBLEM SELECTOR PLAN 1
Workup for falls with potential brain mass etiology.   - MRA H/N without large vessel occlusion or HGS   - MRI w/o contrast: An 8 mm cortically based nodule in the posterior-superior frontal lobe (motor strip) is present with extensive vasogenic edema. Metastasis or focus of atypical infection are leading differential considerations  - ASA, Plavix, and Atorvastatin discontinued to prevent ICH  - q6hr neuro checks and vitals  - neurosurgery recs appreciated: decadron 10 mg IV once, decadron 4 mg q6h taper to 2 mg BID over one week, discharge on 2 mg BID  - s/p bronchoscopy 7/14/23    - pending MRI w/ contrast w/ [place]  - pending CT chest/abdomen/pelvis to r/o malignancy  - if altered/concerning, consider seizure given vasogenic edema  - eventual outpatient neurology follow up    - Stroke Code HCT Results: age indeterminate L cerebellar lacunar infarcts  - Stroke Code CTA Results: deferred due to CKD  - Stroke education. Workup for falls with potential brain mass etiology.   - MRA H/N without large vessel occlusion or HGS   - MRI w/o contrast: An 8 mm cortically based nodule in the posterior-superior frontal lobe (motor strip) is present with extensive vasogenic edema. Metastasis or focus of atypical infection are leading differential considerations  - ASA, Plavix, and Atorvastatin discontinued to prevent ICH  - q6hr neuro checks and vitals  - neurosurgery recs appreciated: decadron 10 mg IV once, decadron 4 mg q6h taper to 2 mg BID over one week, discharge on 2 mg BID  - s/p bronchoscopy 7/14/23    - pending MRI w/ contrast w/ [place]  - if altered/concerning, consider seizure given vasogenic edema  - eventual outpatient neurology follow up    - Stroke Code HCT Results: age indeterminate L cerebellar lacunar infarcts  - Stroke Code CTA Results: deferred due to CKD  - Stroke education.

## 2023-07-17 NOTE — PROGRESS NOTE ADULT - SUBJECTIVE AND OBJECTIVE BOX
Overnight events and interval history: no acute events overnight. Patient states that she is feeling well, but complains of some constipation because she feels unable to defecate comfortably on the bed pan and would prefer to use the toilet. Patient denies headache. fever, CP, SOB, abdominal pain.    VITALS  Vital Signs Last 24 Hrs  T(C): 36.5 (17 Jul 2023 12:10), Max: 36.6 (16 Jul 2023 20:19)  T(F): 97.7 (17 Jul 2023 12:10), Max: 97.8 (16 Jul 2023 20:19)  HR: 54 (17 Jul 2023 14:05) (50 - 66)  BP: 135/70 (17 Jul 2023 14:05) (109/73 - 137/81)  BP(mean): --  RR: 16 (17 Jul 2023 14:05) (16 - 18)  SpO2: 94% (17 Jul 2023 14:05) (93% - 96%)    Parameters below as of 17 Jul 2023 14:05  Patient On (Oxygen Delivery Method): room air        CAPILLARY BLOOD GLUCOSE      POCT Blood Glucose.: 256 mg/dL (17 Jul 2023 18:06)  POCT Blood Glucose.: 332 mg/dL (17 Jul 2023 13:06)  POCT Blood Glucose.: 243 mg/dL (17 Jul 2023 08:33)  POCT Blood Glucose.: 230 mg/dL (16 Jul 2023 22:07)      PHYSICAL EXAM  General: NAD  Head: NC/AT; PERRL; EOMI;  Neck: Supple; no JVD  Respiratory: CTAB; no wheezes  Cardiovascular: Regular rhythm/rate; S1/S2+  Gastrointestinal: Soft; NTND;  Extremities: left shoulder mobility improving  Neurological: A&Ox3,     MEDICATIONS  (STANDING):  amLODIPine   Tablet 10 milliGRAM(s) Oral every 24 hours  dexAMETHasone     Tablet 4 milliGRAM(s) Oral every 12 hours  dextrose 5%. 1000 milliLiter(s) (50 mL/Hr) IV Continuous <Continuous>  dextrose 5%. 1000 milliLiter(s) (100 mL/Hr) IV Continuous <Continuous>  dextrose 50% Injectable 25 Gram(s) IV Push once  dextrose 50% Injectable 12.5 Gram(s) IV Push once  dextrose 50% Injectable 25 Gram(s) IV Push once  glucagon  Injectable 1 milliGRAM(s) IntraMuscular once  hydroxychloroquine 200 milliGRAM(s) Oral every 12 hours  ibuprofen  Tablet. 600 milliGRAM(s) Oral once  insulin glargine Injectable (LANTUS) 16 Unit(s) SubCutaneous at bedtime  insulin lispro (ADMELOG) corrective regimen sliding scale   SubCutaneous Before meals and at bedtime  insulin lispro Injectable (ADMELOG) 10 Unit(s) SubCutaneous three times a day with meals  lidocaine   4% Patch 1 Patch Transdermal every 24 hours  losartan 100 milliGRAM(s) Oral every 24 hours  metoprolol succinate  milliGRAM(s) Oral every 24 hours  sulfaSALAzine 1000 milliGRAM(s) Oral every 12 hours    MEDICATIONS  (PRN):  acetaminophen     Tablet .. 650 milliGRAM(s) Oral every 6 hours PRN Temp greater or equal to 38.5C (101.3F), Mild Pain (1 - 3), Moderate Pain (4 - 6)  dextrose Oral Gel 15 Gram(s) Oral once PRN Blood Glucose LESS THAN 70 milliGRAM(s)/deciliter  dextrose Oral Gel 15 Gram(s) Oral once PRN Blood Glucose LESS THAN 70 milliGRAM(s)/deciliter      No Known Allergies      LABS                        12.0   13.37 )-----------( 195      ( 17 Jul 2023 05:30 )             38.3     07-17    136  |  104  |  53<H>  ----------------------------<  251<H>  4.2   |  20<L>  |  1.45<H>    Ca    7.9<L>      17 Jul 2023 05:30  Phos  2.4     07-17  Mg     1.8     07-17        Urinalysis Basic - ( 17 Jul 2023 05:30 )    Color: x / Appearance: x / SG: x / pH: x  Gluc: 251 mg/dL / Ketone: x  / Bili: x / Urobili: x   Blood: x / Protein: x / Nitrite: x   Leuk Esterase: x / RBC: x / WBC x   Sq Epi: x / Non Sq Epi: x / Bacteria: x              IMAGING/EKG/ETC

## 2023-07-18 DIAGNOSIS — C34.90 MALIGNANT NEOPLASM OF UNSPECIFIED PART OF UNSPECIFIED BRONCHUS OR LUNG: ICD-10-CM

## 2023-07-18 LAB
ANION GAP SERPL CALC-SCNC: 11 MMOL/L — SIGNIFICANT CHANGE UP (ref 5–17)
BUN SERPL-MCNC: 53 MG/DL — HIGH (ref 7–23)
CALCIUM SERPL-MCNC: 7.9 MG/DL — LOW (ref 8.4–10.5)
CHLORIDE SERPL-SCNC: 103 MMOL/L — SIGNIFICANT CHANGE UP (ref 96–108)
CO2 SERPL-SCNC: 21 MMOL/L — LOW (ref 22–31)
CREAT SERPL-MCNC: 1.4 MG/DL — HIGH (ref 0.5–1.3)
EGFR: 39 ML/MIN/1.73M2 — LOW
GLUCOSE BLDC GLUCOMTR-MCNC: 133 MG/DL — HIGH (ref 70–99)
GLUCOSE BLDC GLUCOMTR-MCNC: 216 MG/DL — HIGH (ref 70–99)
GLUCOSE BLDC GLUCOMTR-MCNC: 229 MG/DL — HIGH (ref 70–99)
GLUCOSE BLDC GLUCOMTR-MCNC: 283 MG/DL — HIGH (ref 70–99)
GLUCOSE SERPL-MCNC: 125 MG/DL — HIGH (ref 70–99)
HCT VFR BLD CALC: 37.3 % — SIGNIFICANT CHANGE UP (ref 34.5–45)
HGB BLD-MCNC: 11.8 G/DL — SIGNIFICANT CHANGE UP (ref 11.5–15.5)
MAGNESIUM SERPL-MCNC: 1.9 MG/DL — SIGNIFICANT CHANGE UP (ref 1.6–2.6)
MCHC RBC-ENTMCNC: 29.8 PG — SIGNIFICANT CHANGE UP (ref 27–34)
MCHC RBC-ENTMCNC: 31.6 GM/DL — LOW (ref 32–36)
MCV RBC AUTO: 94.2 FL — SIGNIFICANT CHANGE UP (ref 80–100)
NON-GYNECOLOGICAL CYTOLOGY STUDY: SIGNIFICANT CHANGE UP
NRBC # BLD: 0 /100 WBCS — SIGNIFICANT CHANGE UP (ref 0–0)
PHOSPHATE SERPL-MCNC: 2.5 MG/DL — SIGNIFICANT CHANGE UP (ref 2.5–4.5)
PLATELET # BLD AUTO: 185 K/UL — SIGNIFICANT CHANGE UP (ref 150–400)
POTASSIUM SERPL-MCNC: 4.1 MMOL/L — SIGNIFICANT CHANGE UP (ref 3.5–5.3)
POTASSIUM SERPL-SCNC: 4.1 MMOL/L — SIGNIFICANT CHANGE UP (ref 3.5–5.3)
RBC # BLD: 3.96 M/UL — SIGNIFICANT CHANGE UP (ref 3.8–5.2)
RBC # FLD: 12.9 % — SIGNIFICANT CHANGE UP (ref 10.3–14.5)
SODIUM SERPL-SCNC: 135 MMOL/L — SIGNIFICANT CHANGE UP (ref 135–145)
WBC # BLD: 15.45 K/UL — HIGH (ref 3.8–10.5)
WBC # FLD AUTO: 15.45 K/UL — HIGH (ref 3.8–10.5)

## 2023-07-18 PROCEDURE — 99232 SBSQ HOSP IP/OBS MODERATE 35: CPT | Mod: GC

## 2023-07-18 PROCEDURE — 77300 RADIATION THERAPY DOSE PLAN: CPT | Mod: 26

## 2023-07-18 PROCEDURE — 77295 3-D RADIOTHERAPY PLAN: CPT | Mod: 26

## 2023-07-18 PROCEDURE — 77334 RADIATION TREATMENT AID(S): CPT | Mod: 26

## 2023-07-18 RX ORDER — DEXAMETHASONE 0.5 MG/5ML
4 ELIXIR ORAL
Refills: 0 | Status: COMPLETED | OUTPATIENT
Start: 2023-07-18 | End: 2023-07-20

## 2023-07-18 RX ORDER — INSULIN GLARGINE 100 [IU]/ML
20 INJECTION, SOLUTION SUBCUTANEOUS AT BEDTIME
Refills: 0 | Status: DISCONTINUED | OUTPATIENT
Start: 2023-07-18 | End: 2023-07-20

## 2023-07-18 RX ORDER — DEXTROSE 50 % IN WATER 50 %
25 SYRINGE (ML) INTRAVENOUS ONCE
Refills: 0 | Status: DISCONTINUED | OUTPATIENT
Start: 2023-07-18 | End: 2023-07-21

## 2023-07-18 RX ORDER — DEXTROSE 50 % IN WATER 50 %
25 SYRINGE (ML) INTRAVENOUS ONCE
Refills: 0 | Status: DISCONTINUED | OUTPATIENT
Start: 2023-07-18 | End: 2023-07-18

## 2023-07-18 RX ORDER — INSULIN LISPRO 100/ML
12 VIAL (ML) SUBCUTANEOUS
Refills: 0 | Status: DISCONTINUED | OUTPATIENT
Start: 2023-07-18 | End: 2023-07-20

## 2023-07-18 RX ORDER — INSULIN LISPRO 100/ML
VIAL (ML) SUBCUTANEOUS
Refills: 0 | Status: DISCONTINUED | OUTPATIENT
Start: 2023-07-18 | End: 2023-07-21

## 2023-07-18 RX ORDER — DEXTROSE 50 % IN WATER 50 %
15 SYRINGE (ML) INTRAVENOUS ONCE
Refills: 0 | Status: DISCONTINUED | OUTPATIENT
Start: 2023-07-18 | End: 2023-07-21

## 2023-07-18 RX ORDER — DEXTROSE 50 % IN WATER 50 %
12.5 SYRINGE (ML) INTRAVENOUS ONCE
Refills: 0 | Status: DISCONTINUED | OUTPATIENT
Start: 2023-07-18 | End: 2023-07-21

## 2023-07-18 RX ADMIN — Medication 10 UNIT(S): at 12:47

## 2023-07-18 RX ADMIN — AMLODIPINE BESYLATE 10 MILLIGRAM(S): 2.5 TABLET ORAL at 06:06

## 2023-07-18 RX ADMIN — Medication 4 MILLIGRAM(S): at 06:06

## 2023-07-18 RX ADMIN — Medication 62.5 MILLIMOLE(S): at 14:19

## 2023-07-18 RX ADMIN — LIDOCAINE 1 PATCH: 4 CREAM TOPICAL at 16:56

## 2023-07-18 RX ADMIN — INSULIN GLARGINE 20 UNIT(S): 100 INJECTION, SOLUTION SUBCUTANEOUS at 23:04

## 2023-07-18 RX ADMIN — Medication 4: at 23:05

## 2023-07-18 RX ADMIN — Medication 650 MILLIGRAM(S): at 22:51

## 2023-07-18 RX ADMIN — LIDOCAINE 1 PATCH: 4 CREAM TOPICAL at 08:14

## 2023-07-18 RX ADMIN — Medication 4 MILLIGRAM(S): at 18:21

## 2023-07-18 RX ADMIN — Medication 1000 MILLIGRAM(S): at 17:02

## 2023-07-18 RX ADMIN — Medication 100 MILLIGRAM(S): at 08:22

## 2023-07-18 RX ADMIN — Medication 1000 MILLIGRAM(S): at 06:05

## 2023-07-18 RX ADMIN — Medication 200 MILLIGRAM(S): at 17:02

## 2023-07-18 RX ADMIN — Medication 4: at 12:47

## 2023-07-18 RX ADMIN — Medication 200 MILLIGRAM(S): at 06:06

## 2023-07-18 RX ADMIN — LIDOCAINE 1 PATCH: 4 CREAM TOPICAL at 06:07

## 2023-07-18 RX ADMIN — Medication 12 UNIT(S): at 18:25

## 2023-07-18 RX ADMIN — Medication 10 UNIT(S): at 09:03

## 2023-07-18 NOTE — PROGRESS NOTE ADULT - PROBLEM SELECTOR PLAN 4
- c home Sulfasalazine and Plaquenil  - Ibuprofen and Tylenol PRN for pain. - A1C results: 7.4   - insulin glargine Injectable (LANTUS) 20 Unit(s) SubCutaneous at bedtime  - insulin lispro Injectable (ADMELOG) 6 Unit(s) SubCutaneous three times a day before meals  - insulin lispro (ADMELOG) corrective regimen sliding scale   SubCutaneous three times a day before meals -LDL results: 69  -holding statin

## 2023-07-18 NOTE — PROGRESS NOTE ADULT - PROBLEM SELECTOR PLAN 1
Workup for falls with potential brain mass etiology.   - MRA H/N without large vessel occlusion or HGS   - MRI w/o contrast: An 8 mm cortically based nodule in the posterior-superior frontal lobe (motor strip) is present with extensive vasogenic edema. Metastasis or focus of atypical infection are leading differential considerations  - ASA, Plavix, and Atorvastatin discontinued to prevent ICH  - q6hr neuro checks and vitals  - neurosurgery recs appreciated: decadron 10 mg IV once, decadron 4 mg q6h taper to 2 mg BID over one week, discharge on 2 mg BID  - s/p bronchoscopy 7/14/23    - pending MRI w/ contrast w/ [place]  - if altered/concerning, consider seizure given vasogenic edema  - eventual outpatient neurology follow up    - Stroke Code HCT Results: age indeterminate L cerebellar lacunar infarcts  - Stroke Code CTA Results: deferred due to CKD  - Stroke education. 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  These findings are best viewed on the cellblock. They are morphologically similar to those seen in the left lung FNA Workup for falls with potential brain mass etiology- see below.   - ASA, Plavix, and Atorvastatin discontinued to prevent ICH  - q6hr neuro checks and vitals  - if altered/concerning, consider seizure given vasogenic edema  - eventual outpatient neurology follow up    - Stroke Code HCT Results: age indeterminate L cerebellar lacunar infarcts  - Stroke Code CTA Results: deferred due to CKD  - Stroke education.

## 2023-07-18 NOTE — PROGRESS NOTE ADULT - PROBLEM SELECTOR PLAN 2
-LDL results: 69  -holding statin Workup for falls with potential brain mass etiology.   - MRA H/N without large vessel occlusion or HGS   - MRI w/o contrast: An 8 mm cortically based nodule in the posterior-superior frontal lobe (motor strip) is present with extensive vasogenic edema. Metastasis or focus of atypical infection are leading differential considerations  - ASA, Plavix, and Atorvastatin discontinued to prevent ICH  - q6hr neuro checks and vitals  - neurosurgery recs appreciated: decadron 10 mg IV once, decadron 4 mg q6h taper to 2 mg BID over one week, discharge on 2 mg BID  - s/p bronchoscopy 7/14/23    - pending MRI w/ contrast w/ [facility]  - if altered/concerning, consider seizure given vasogenic edema  - eventual outpatient neurology follow up    - Stroke Code HCT Results: age indeterminate L cerebellar lacunar infarcts  - Stroke Code CTA Results: deferred due to CKD  - Stroke education. 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  These findings are best viewed on the cellblock. They are morphologically similar to those seen in the left lung FNA.    - s/p bronchoscopy 7/14/23

## 2023-07-18 NOTE — PROGRESS NOTE ADULT - PROBLEM SELECTOR PLAN 6
- c BP meds, amlodipine 10mg, losartan 100mg, Toprol 100mg , uptitrate if needed  - TTE: moderate LVH, grossly unremarkable  - Stroke Code EKG Results: NSR. baseline Cr 2  - voiding. - c home Sulfasalazine and Plaquenil  - Ibuprofen and Tylenol PRN for pain.

## 2023-07-18 NOTE — CHART NOTE - NSCHARTNOTEFT_GEN_A_CORE
Ms. Minor would benefit from acute rehab and would be able to participate in greater than 3 hours of physical therapy per day to improve her strength.

## 2023-07-18 NOTE — PROGRESS NOTE ADULT - SUBJECTIVE AND OBJECTIVE BOX
Overnight  Over night and interval history: No acute events over night. Patient seen and examined at bedside. Patient states that she was able to have a large BM today because she was able to get out of bed assisted to the toilet. Denies all other ROS.    VITALS  Vital Signs Last 24 Hrs  T(C): 36.6 (18 Jul 2023 12:17), Max: 36.9 (17 Jul 2023 20:32)  T(F): 97.8 (18 Jul 2023 12:17), Max: 98.4 (17 Jul 2023 20:32)  HR: 56 (18 Jul 2023 14:27) (56 - 68)  BP: 126/69 (18 Jul 2023 14:27) (109/78 - 153/81)  BP(mean): --  RR: 16 (18 Jul 2023 14:27) (16 - 17)  SpO2: 96% (18 Jul 2023 14:27) (96% - 99%)    Parameters below as of 18 Jul 2023 14:27  Patient On (Oxygen Delivery Method): room air        CAPILLARY BLOOD GLUCOSE      POCT Blood Glucose.: 216 mg/dL (18 Jul 2023 12:39)  POCT Blood Glucose.: 133 mg/dL (18 Jul 2023 08:54)  POCT Blood Glucose.: 299 mg/dL (17 Jul 2023 21:43)  POCT Blood Glucose.: 256 mg/dL (17 Jul 2023 18:06)      PHYSICAL EXAM  General: NAD  Head: NC/AT; PERRL; EOMI;  Neck: Supple; no JVD  Respiratory: CTAB; no wheezes  Cardiovascular: Regular rhythm/rate; S1/S2+, no murmurs  Gastrointestinal: Soft; NTND; bowel sounds normal and present  Extremities: weakness in left shoulder improving; no edema  Neurological: A&Ox3    MEDICATIONS  (STANDING):  amLODIPine   Tablet 10 milliGRAM(s) Oral every 24 hours  dextrose 5%. 1000 milliLiter(s) (100 mL/Hr) IV Continuous <Continuous>  dextrose 5%. 1000 milliLiter(s) (50 mL/Hr) IV Continuous <Continuous>  dextrose 50% Injectable 25 Gram(s) IV Push once  dextrose 50% Injectable 12.5 Gram(s) IV Push once  dextrose 50% Injectable 25 Gram(s) IV Push once  glucagon  Injectable 1 milliGRAM(s) IntraMuscular once  hydroxychloroquine 200 milliGRAM(s) Oral every 12 hours  ibuprofen  Tablet. 600 milliGRAM(s) Oral once  insulin glargine Injectable (LANTUS) 16 Unit(s) SubCutaneous at bedtime  insulin lispro (ADMELOG) corrective regimen sliding scale   SubCutaneous Before meals and at bedtime  insulin lispro Injectable (ADMELOG) 10 Unit(s) SubCutaneous three times a day with meals  lidocaine   4% Patch 1 Patch Transdermal every 24 hours  losartan 100 milliGRAM(s) Oral every 24 hours  metoprolol succinate  milliGRAM(s) Oral every 24 hours  sulfaSALAzine 1000 milliGRAM(s) Oral every 12 hours    MEDICATIONS  (PRN):  acetaminophen     Tablet .. 650 milliGRAM(s) Oral every 6 hours PRN Temp greater or equal to 38.5C (101.3F), Mild Pain (1 - 3), Moderate Pain (4 - 6)  dextrose Oral Gel 15 Gram(s) Oral once PRN Blood Glucose LESS THAN 70 milliGRAM(s)/deciliter  dextrose Oral Gel 15 Gram(s) Oral once PRN Blood Glucose LESS THAN 70 milliGRAM(s)/deciliter      No Known Allergies      LABS                        11.8   15.45 )-----------( 185      ( 18 Jul 2023 05:30 )             37.3     07-18    135  |  103  |  53<H>  ----------------------------<  125<H>  4.1   |  21<L>  |  1.40<H>    Ca    7.9<L>      18 Jul 2023 05:30  Phos  2.5     07-18  Mg     1.9     07-18        Urinalysis Basic - ( 18 Jul 2023 05:30 )    Color: x / Appearance: x / SG: x / pH: x  Gluc: 125 mg/dL / Ketone: x  / Bili: x / Urobili: x   Blood: x / Protein: x / Nitrite: x   Leuk Esterase: x / RBC: x / WBC x   Sq Epi: x / Non Sq Epi: x / Bacteria: x              IMAGING/EKG/ETC

## 2023-07-18 NOTE — PROGRESS NOTE ADULT - PROBLEM SELECTOR PLAN 7
- c BP meds, amlodipine 10mg, losartan 100mg, Toprol 100mg , uptitrate if needed  - TTE: moderate LVH, grossly unremarkable  - Stroke Code EKG Results: NSR. baseline Cr 2  - voiding.

## 2023-07-18 NOTE — PROGRESS NOTE ADULT - PROBLEM SELECTOR PLAN 5
baseline Cr 2  - voiding. - c home Sulfasalazine and Plaquenil  - Ibuprofen and Tylenol PRN for pain. - A1C results: 7.4   - insulin glargine Injectable (LANTUS) 20 Unit(s) SubCutaneous at bedtime  - insulin lispro Injectable (ADMELOG) 6 Unit(s) SubCutaneous three times a day before meals  - insulin lispro (ADMELOG) corrective regimen sliding scale   SubCutaneous three times a day before meals

## 2023-07-18 NOTE — PROGRESS NOTE ADULT - PROBLEM SELECTOR PLAN 3
- A1C results: 7.4   - insulin glargine Injectable (LANTUS) 20 Unit(s) SubCutaneous at bedtime  - insulin lispro Injectable (ADMELOG) 6 Unit(s) SubCutaneous three times a day before meals  - insulin lispro (ADMELOG) corrective regimen sliding scale   SubCutaneous three times a day before meals -LDL results: 69  -holding statin MRA H/N without large vessel occlusion or HGS   MRI w/o contrast: An 8 mm cortically based nodule in the posterior-superior frontal lobe (motor strip) is present with extensive vasogenic edema. Metastasis or focus of atypical infection are leading differential considerations    - neurosurgery recs appreciated: decadron 10 mg IV once, decadron 4 mg q6h taper to 2 mg BID over one week, discharge on 2 mg BID  - pending MRI w/ contrast w/ [facility]

## 2023-07-18 NOTE — PROGRESS NOTE ADULT - TIME BILLING
Patient seen and examined;  Patient agrees to [facility]  Will follow up with Oncology and neurosurgery as outpatient

## 2023-07-18 NOTE — PROGRESS NOTE ADULT - SUBJECTIVE AND OBJECTIVE BOX
INTERVAL HPI/OVERNIGHT EVENTS:  Awake and in good spirits;  Path pending       MEDICATIONS  (STANDING):  amLODIPine   Tablet 10 milliGRAM(s) Oral every 24 hours  dextrose 5%. 1000 milliLiter(s) (100 mL/Hr) IV Continuous <Continuous>  dextrose 5%. 1000 milliLiter(s) (50 mL/Hr) IV Continuous <Continuous>  dextrose 50% Injectable 25 Gram(s) IV Push once  dextrose 50% Injectable 12.5 Gram(s) IV Push once  dextrose 50% Injectable 25 Gram(s) IV Push once  glucagon  Injectable 1 milliGRAM(s) IntraMuscular once  hydroxychloroquine 200 milliGRAM(s) Oral every 12 hours  ibuprofen  Tablet. 600 milliGRAM(s) Oral once  insulin glargine Injectable (LANTUS) 16 Unit(s) SubCutaneous at bedtime  insulin lispro (ADMELOG) corrective regimen sliding scale   SubCutaneous Before meals and at bedtime  insulin lispro Injectable (ADMELOG) 10 Unit(s) SubCutaneous three times a day with meals  lidocaine   4% Patch 1 Patch Transdermal every 24 hours  losartan 100 milliGRAM(s) Oral every 24 hours  metoprolol succinate  milliGRAM(s) Oral every 24 hours  sulfaSALAzine 1000 milliGRAM(s) Oral every 12 hours    MEDICATIONS  (PRN):  acetaminophen     Tablet .. 650 milliGRAM(s) Oral every 6 hours PRN Temp greater or equal to 38.5C (101.3F), Mild Pain (1 - 3), Moderate Pain (4 - 6)  dextrose Oral Gel 15 Gram(s) Oral once PRN Blood Glucose LESS THAN 70 milliGRAM(s)/deciliter  dextrose Oral Gel 15 Gram(s) Oral once PRN Blood Glucose LESS THAN 70 milliGRAM(s)/deciliter      Allergies    No Known Allergies    Intolerances        Vital Signs Last 24 Hrs  T(C): 36.4 (18 Jul 2023 08:18), Max: 36.9 (17 Jul 2023 20:32)  T(F): 97.6 (18 Jul 2023 08:18), Max: 98.4 (17 Jul 2023 20:32)  HR: 63 (18 Jul 2023 08:18) (52 - 68)  BP: 153/81 (18 Jul 2023 08:18) (109/78 - 153/81)  BP(mean): --  RR: 16 (18 Jul 2023 08:18) (16 - 17)  SpO2: 99% (18 Jul 2023 08:18) (93% - 99%)    Parameters below as of 18 Jul 2023 08:18  Patient On (Oxygen Delivery Method): room air              Constitutional:  Awake     Eyes: NEDRA    ENMT: Negative    Neck: Supple    Back:  no tenderness     Respiratory:  clear    Cardiovascular: S1 S2    Gastrointestinal:  soft     Genitourinary:    Extremities: no edema     Vascular:    Neurological:    Skin:    Lymph Nodes:            07-17 @ 07:01  -  07-18 @ 07:00  --------------------------------------------------------  IN: 0 mL / OUT: 600 mL / NET: -600 mL      LABS:                        11.8   15.45 )-----------( 185      ( 18 Jul 2023 05:30 )             37.3     07-18    135  |  103  |  53<H>  ----------------------------<  125<H>  4.1   |  21<L>  |  1.40<H>    Ca    7.9<L>      18 Jul 2023 05:30  Phos  2.5     07-18  Mg     1.9     07-18        Urinalysis Basic - ( 18 Jul 2023 05:30 )    Color: x / Appearance: x / SG: x / pH: x  Gluc: 125 mg/dL / Ketone: x  / Bili: x / Urobili: x   Blood: x / Protein: x / Nitrite: x   Leuk Esterase: x / RBC: x / WBC x   Sq Epi: x / Non Sq Epi: x / Bacteria: x        RADIOLOGY & ADDITIONAL TESTS:

## 2023-07-19 LAB
ANION GAP SERPL CALC-SCNC: 8 MMOL/L — SIGNIFICANT CHANGE UP (ref 5–17)
ANISOCYTOSIS BLD QL: SLIGHT — SIGNIFICANT CHANGE UP
ANISOCYTOSIS BLD QL: SLIGHT — SIGNIFICANT CHANGE UP
BASOPHILS # BLD AUTO: 0 K/UL — SIGNIFICANT CHANGE UP (ref 0–0.2)
BASOPHILS # BLD AUTO: 0 K/UL — SIGNIFICANT CHANGE UP (ref 0–0.2)
BASOPHILS NFR BLD AUTO: 0 % — SIGNIFICANT CHANGE UP (ref 0–2)
BASOPHILS NFR BLD AUTO: 0 % — SIGNIFICANT CHANGE UP (ref 0–2)
BUN SERPL-MCNC: 45 MG/DL — HIGH (ref 7–23)
BURR CELLS BLD QL SMEAR: PRESENT — SIGNIFICANT CHANGE UP
CALCIUM SERPL-MCNC: 8 MG/DL — LOW (ref 8.4–10.5)
CHLORIDE SERPL-SCNC: 106 MMOL/L — SIGNIFICANT CHANGE UP (ref 96–108)
CO2 SERPL-SCNC: 20 MMOL/L — LOW (ref 22–31)
CREAT SERPL-MCNC: 1.51 MG/DL — HIGH (ref 0.5–1.3)
EGFR: 36 ML/MIN/1.73M2 — LOW
EOSINOPHIL # BLD AUTO: 0 K/UL — SIGNIFICANT CHANGE UP (ref 0–0.5)
EOSINOPHIL # BLD AUTO: 0 K/UL — SIGNIFICANT CHANGE UP (ref 0–0.5)
EOSINOPHIL NFR BLD AUTO: 0 % — SIGNIFICANT CHANGE UP (ref 0–6)
EOSINOPHIL NFR BLD AUTO: 0 % — SIGNIFICANT CHANGE UP (ref 0–6)
GLUCOSE BLDC GLUCOMTR-MCNC: 139 MG/DL — HIGH (ref 70–99)
GLUCOSE BLDC GLUCOMTR-MCNC: 257 MG/DL — HIGH (ref 70–99)
GLUCOSE BLDC GLUCOMTR-MCNC: 416 MG/DL — HIGH (ref 70–99)
GLUCOSE BLDC GLUCOMTR-MCNC: 87 MG/DL — SIGNIFICANT CHANGE UP (ref 70–99)
GLUCOSE SERPL-MCNC: 116 MG/DL — HIGH (ref 70–99)
HBV CORE AB SER-ACNC: REACTIVE
HBV CORE IGM SER-ACNC: SIGNIFICANT CHANGE UP
HBV SURFACE AB SER-ACNC: REACTIVE
HBV SURFACE AG SER-ACNC: SIGNIFICANT CHANGE UP
HCT VFR BLD CALC: 41.5 % — SIGNIFICANT CHANGE UP (ref 34.5–45)
HCT VFR BLD CALC: 43.5 % — SIGNIFICANT CHANGE UP (ref 34.5–45)
HGB BLD-MCNC: 13 G/DL — SIGNIFICANT CHANGE UP (ref 11.5–15.5)
HGB BLD-MCNC: 13.4 G/DL — SIGNIFICANT CHANGE UP (ref 11.5–15.5)
LYMPHOCYTES # BLD AUTO: 12.3 % — LOW (ref 13–44)
LYMPHOCYTES # BLD AUTO: 15.9 % — SIGNIFICANT CHANGE UP (ref 13–44)
LYMPHOCYTES # BLD AUTO: 2.05 K/UL — SIGNIFICANT CHANGE UP (ref 1–3.3)
LYMPHOCYTES # BLD AUTO: 2.09 K/UL — SIGNIFICANT CHANGE UP (ref 1–3.3)
MACROCYTES BLD QL: SLIGHT — SIGNIFICANT CHANGE UP
MAGNESIUM SERPL-MCNC: 2 MG/DL — SIGNIFICANT CHANGE UP (ref 1.6–2.6)
MANUAL SMEAR VERIFICATION: SIGNIFICANT CHANGE UP
MANUAL SMEAR VERIFICATION: SIGNIFICANT CHANGE UP
MCHC RBC-ENTMCNC: 29.6 PG — SIGNIFICANT CHANGE UP (ref 27–34)
MCHC RBC-ENTMCNC: 30.4 PG — SIGNIFICANT CHANGE UP (ref 27–34)
MCHC RBC-ENTMCNC: 30.8 GM/DL — LOW (ref 32–36)
MCHC RBC-ENTMCNC: 31.3 GM/DL — LOW (ref 32–36)
MCV RBC AUTO: 94.5 FL — SIGNIFICANT CHANGE UP (ref 80–100)
MCV RBC AUTO: 98.6 FL — SIGNIFICANT CHANGE UP (ref 80–100)
METAMYELOCYTES # FLD: 2.7 % — HIGH (ref 0–0)
MICROCYTES BLD QL: SLIGHT — SIGNIFICANT CHANGE UP
MONOCYTES # BLD AUTO: 0.34 K/UL — SIGNIFICANT CHANGE UP (ref 0–0.9)
MONOCYTES # BLD AUTO: 0.43 K/UL — SIGNIFICANT CHANGE UP (ref 0–0.9)
MONOCYTES NFR BLD AUTO: 2.6 % — SIGNIFICANT CHANGE UP (ref 2–14)
MONOCYTES NFR BLD AUTO: 2.6 % — SIGNIFICANT CHANGE UP (ref 2–14)
NEUTROPHILS # BLD AUTO: 10.36 K/UL — HIGH (ref 1.8–7.4)
NEUTROPHILS # BLD AUTO: 14.2 K/UL — HIGH (ref 1.8–7.4)
NEUTROPHILS NFR BLD AUTO: 77.9 % — HIGH (ref 43–77)
NEUTROPHILS NFR BLD AUTO: 85.1 % — HIGH (ref 43–77)
NEUTS BAND # BLD: 0.9 % — SIGNIFICANT CHANGE UP (ref 0–8)
NRBC # BLD: 0 /100 WBCS — SIGNIFICANT CHANGE UP (ref 0–0)
NRBC # BLD: 2 /100 — HIGH (ref 0–0)
OVALOCYTES BLD QL SMEAR: SLIGHT — SIGNIFICANT CHANGE UP
OVALOCYTES BLD QL SMEAR: SLIGHT — SIGNIFICANT CHANGE UP
PHOSPHATE SERPL-MCNC: 2.6 MG/DL — SIGNIFICANT CHANGE UP (ref 2.5–4.5)
PLAT MORPH BLD: ABNORMAL
PLAT MORPH BLD: NORMAL — SIGNIFICANT CHANGE UP
PLATELET # BLD AUTO: 109 K/UL — LOW (ref 150–400)
PLATELET # BLD AUTO: 219 K/UL — SIGNIFICANT CHANGE UP (ref 150–400)
POIKILOCYTOSIS BLD QL AUTO: SLIGHT — SIGNIFICANT CHANGE UP
POIKILOCYTOSIS BLD QL AUTO: SLIGHT — SIGNIFICANT CHANGE UP
POTASSIUM SERPL-MCNC: 4.9 MMOL/L — SIGNIFICANT CHANGE UP (ref 3.5–5.3)
POTASSIUM SERPL-SCNC: 4.9 MMOL/L — SIGNIFICANT CHANGE UP (ref 3.5–5.3)
RBC # BLD: 4.39 M/UL — SIGNIFICANT CHANGE UP (ref 3.8–5.2)
RBC # BLD: 4.41 M/UL — SIGNIFICANT CHANGE UP (ref 3.8–5.2)
RBC # FLD: 12.9 % — SIGNIFICANT CHANGE UP (ref 10.3–14.5)
RBC # FLD: 13.1 % — SIGNIFICANT CHANGE UP (ref 10.3–14.5)
RBC BLD AUTO: ABNORMAL
RBC BLD AUTO: ABNORMAL
SODIUM SERPL-SCNC: 134 MMOL/L — LOW (ref 135–145)
SPHEROCYTES BLD QL SMEAR: SLIGHT — SIGNIFICANT CHANGE UP
SURGICAL PATHOLOGY STUDY: SIGNIFICANT CHANGE UP
WBC # BLD: 13.15 K/UL — HIGH (ref 3.8–10.5)
WBC # BLD: 16.69 K/UL — HIGH (ref 3.8–10.5)
WBC # FLD AUTO: 13.15 K/UL — HIGH (ref 3.8–10.5)
WBC # FLD AUTO: 16.69 K/UL — HIGH (ref 3.8–10.5)

## 2023-07-19 PROCEDURE — 99232 SBSQ HOSP IP/OBS MODERATE 35: CPT | Mod: GC

## 2023-07-19 RX ADMIN — Medication 100 MILLIGRAM(S): at 09:30

## 2023-07-19 RX ADMIN — Medication 62.5 MILLIMOLE(S): at 11:11

## 2023-07-19 RX ADMIN — LIDOCAINE 1 PATCH: 4 CREAM TOPICAL at 19:01

## 2023-07-19 RX ADMIN — AMLODIPINE BESYLATE 10 MILLIGRAM(S): 2.5 TABLET ORAL at 06:17

## 2023-07-19 RX ADMIN — Medication 4 MILLIGRAM(S): at 18:10

## 2023-07-19 RX ADMIN — Medication 12 UNIT(S): at 13:37

## 2023-07-19 RX ADMIN — Medication 650 MILLIGRAM(S): at 19:16

## 2023-07-19 RX ADMIN — Medication 1000 MILLIGRAM(S): at 19:01

## 2023-07-19 RX ADMIN — Medication 200 MILLIGRAM(S): at 18:10

## 2023-07-19 RX ADMIN — INSULIN GLARGINE 20 UNIT(S): 100 INJECTION, SOLUTION SUBCUTANEOUS at 22:42

## 2023-07-19 RX ADMIN — Medication 12 UNIT(S): at 09:56

## 2023-07-19 RX ADMIN — Medication 1000 MILLIGRAM(S): at 07:53

## 2023-07-19 RX ADMIN — Medication 200 MILLIGRAM(S): at 06:16

## 2023-07-19 RX ADMIN — LOSARTAN POTASSIUM 100 MILLIGRAM(S): 100 TABLET, FILM COATED ORAL at 15:14

## 2023-07-19 RX ADMIN — Medication 4 MILLIGRAM(S): at 06:17

## 2023-07-19 RX ADMIN — LIDOCAINE 1 PATCH: 4 CREAM TOPICAL at 08:22

## 2023-07-19 RX ADMIN — Medication 650 MILLIGRAM(S): at 00:30

## 2023-07-19 RX ADMIN — Medication 12: at 22:42

## 2023-07-19 RX ADMIN — Medication 6: at 13:36

## 2023-07-19 NOTE — PROGRESS NOTE ADULT - PROBLEM SELECTOR PLAN 1
Workup for falls with potential brain mass etiology- see below.   - ASA, Plavix, and Atorvastatin discontinued to prevent ICH  - q6hr neuro checks and vitals  - if altered/concerning, consider seizure given vasogenic edema  - eventual outpatient neurology follow up    - Stroke Code HCT Results: age indeterminate L cerebellar lacunar infarcts  - Stroke Code CTA Results: deferred due to CKD  - Stroke education.

## 2023-07-19 NOTE — PROGRESS NOTE ADULT - SUBJECTIVE AND OBJECTIVE BOX
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 1000 milliLiter(s) (100 mL/Hr) IV Continuous <Continuous>  dextrose 50% Injectable 25 Gram(s) IV Push once  dextrose 50% Injectable 12.5 Gram(s) IV Push once  glucagon  Injectable 1 milliGRAM(s) IntraMuscular once  hydroxychloroquine 200 milliGRAM(s) Oral every 12 hours  ibuprofen  Tablet. 600 milliGRAM(s) Oral once  insulin glargine Injectable (LANTUS) 20 Unit(s) SubCutaneous at bedtime  insulin lispro (ADMELOG) corrective regimen sliding scale   SubCutaneous Before meals and at bedtime  insulin lispro Injectable (ADMELOG) 12 Unit(s) SubCutaneous three times a day before meals  lidocaine   4% Patch 1 Patch Transdermal every 24 hours  losartan 100 milliGRAM(s) Oral every 24 hours  metoprolol succinate  milliGRAM(s) Oral every 24 hours  sodium phosphate 15 milliMole(s)/250 mL IVPB 15 milliMole(s) IV Intermittent once  sulfaSALAzine 1000 milliGRAM(s) Oral every 12 hours    MEDICATIONS  (PRN):  acetaminophen     Tablet .. 650 milliGRAM(s) Oral every 6 hours PRN Temp greater or equal to 38.5C (101.3F), Mild Pain (1 - 3), Moderate Pain (4 - 6)  dextrose Oral Gel 15 Gram(s) Oral once PRN Blood Glucose LESS THAN 70 milliGRAM(s)/deciliter      Allergies    No Known Allergies    Intolerances        Vital Signs Last 24 Hrs  T(C): 36.7 (19 Jul 2023 05:27), Max: 36.8 (18 Jul 2023 21:57)  T(F): 98.1 (19 Jul 2023 05:27), Max: 98.2 (18 Jul 2023 21:57)  HR: 62 (19 Jul 2023 08:48) (56 - 68)  BP: 148/89 (19 Jul 2023 08:48) (117/73 - 152/88)  BP(mean): --  RR: 19 (19 Jul 2023 05:27) (16 - 19)  SpO2: 98% (19 Jul 2023 05:27) (95% - 98%)    Parameters below as of 19 Jul 2023 05:27  Patient On (Oxygen Delivery Method): room air              Constitutional:    Eyes: NEDRA    ENMT: Negative    Neck: Supple    Back:  no tenderness     Respiratory:  clear    Cardiovascular: S1 S2    Gastrointestinal: soft    Genitourinary:    Extremities: no edema     Vascular:    Neurological:    Skin:    Lymph Nodes:            07-18 @ 07:01  -  07-19 @ 07:00  --------------------------------------------------------  IN: 0 mL / OUT: 600 mL / NET: -600 mL      LABS:                        13.4   13.15 )-----------( 109      ( 19 Jul 2023 05:30 )             43.5     07-19    134<L>  |  106  |  45<H>  ----------------------------<  116<H>  4.9   |  20<L>  |  1.51<H>    Ca    8.0<L>      19 Jul 2023 05:30  Phos  2.6     07-19  Mg     2.0     07-19        Urinalysis Basic - ( 19 Jul 2023 05:30 )    Color: x / Appearance: x / SG: x / pH: x  Gluc: 116 mg/dL / Ketone: x  / Bili: x / Urobili: x   Blood: x / Protein: x / Nitrite: x   Leuk Esterase: x / RBC: x / WBC x   Sq Epi: x / Non Sq Epi: x / Bacteria: x        RADIOLOGY & ADDITIONAL TESTS:

## 2023-07-19 NOTE — PROGRESS NOTE ADULT - PROBLEM SELECTOR PLAN 3
MRA H/N without large vessel occlusion or HGS   MRI w/o contrast: An 8 mm cortically based nodule in the posterior-superior frontal lobe (motor strip) is present with extensive vasogenic edema. Metastasis or focus of atypical infection are leading differential considerations    - neurosurgery recs appreciated: decadron 10 mg IV once, decadron 4 mg q6h taper to 2 mg BID over one week, discharge on 2 mg BID  - pending MRI w/ contrast w/ [facility]

## 2023-07-19 NOTE — PROGRESS NOTE ADULT - PROBLEM SELECTOR PLAN 2
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  These findings are best viewed on the cellblock. They are morphologically similar to those seen in the left lung FNA.    - s/p bronchoscopy 7/14/23

## 2023-07-19 NOTE — PROGRESS NOTE ADULT - PROBLEM SELECTOR PLAN 5
- A1C results: 7.4   - insulin glargine Injectable (LANTUS) 20 Unit(s) SubCutaneous at bedtime  - insulin lispro Injectable (ADMELOG) 6 Unit(s) SubCutaneous three times a day before meals  - insulin lispro (ADMELOG) corrective regimen sliding scale   SubCutaneous three times a day before meals Insulin basal bolus and prandial adjusted  - A1C results: 7.4  - insulin glargine Injectable (LANTUS) 20 Unit(s) SubCutaneous at bedtime  - insulin lispro Injectable (ADMELOG) 12 Unit(s) SubCutaneous three times a day before meals  - insulin lispro (ADMELOG) corrective regimen sliding scale   SubCutaneous three times a day before meals

## 2023-07-19 NOTE — PROGRESS NOTE ADULT - TIME BILLING
Patient seen and examined;  Patient aware of cancer diagnosis   Will go to [facility]  Follow up with oncology and RT

## 2023-07-19 NOTE — CHART NOTE - NSCHARTNOTEFT_GEN_A_CORE
Hematology Oncology Chart Note    Discussed pathology findings and diagnosis with patient. Immunohistochemistry from biopsy stain consistent with poorly differentiated lung adenocarcinoma. Full treatment options will be dependent on next generation sequencing of the tumor for targetable mutations and will be discussed at outpatient appointment. Patient given clinic contact information and appointment card for an appointment with Dr. Sedrick Delaney at [facility] scheduled for 7/25 at 10:20 AM. Please ensure that this information is provided in discharge paperwork.    Discussed with hematology oncology attending Dr. Sedrick Delaney. Hematology Oncology Chart Note    Discussed pathology findings and diagnosis with patient. Immunohistochemistry from biopsy stain consistent with poorly differentiated lung adenocarcinoma. Full treatment options will be dependent on next generation sequencing of the tumor for targetable mutations and will be discussed at outpatient appointment. Patient given clinic contact information and appointment card for an appointment with Dr. Sedrick Delaney at [facility] scheduled for 7/25 at 10:20 AM. Please ensure that this information is provided in discharge paperwork.    Plan:  - Please give a one time dose of B12 1000mcg IM and daily folic acid 1 mg (in preparation for potential systemic therapy)  - Recommend hepatitis testing    Discussed with hematology oncology attending Dr. Sedrick Delaney.

## 2023-07-20 ENCOUNTER — NON-APPOINTMENT (OUTPATIENT)
Age: 73
End: 2023-07-20

## 2023-07-20 ENCOUNTER — TRANSCRIPTION ENCOUNTER (OUTPATIENT)
Age: 73
End: 2023-07-20

## 2023-07-20 LAB
ANION GAP SERPL CALC-SCNC: 8 MMOL/L — SIGNIFICANT CHANGE UP (ref 5–17)
BUN SERPL-MCNC: 48 MG/DL — HIGH (ref 7–23)
CALCIUM SERPL-MCNC: 7.6 MG/DL — LOW (ref 8.4–10.5)
CHLORIDE SERPL-SCNC: 105 MMOL/L — SIGNIFICANT CHANGE UP (ref 96–108)
CO2 SERPL-SCNC: 24 MMOL/L — SIGNIFICANT CHANGE UP (ref 22–31)
CREAT SERPL-MCNC: 1.42 MG/DL — HIGH (ref 0.5–1.3)
EGFR: 39 ML/MIN/1.73M2 — LOW
FOLATE SERPL-MCNC: 19.5 NG/ML — SIGNIFICANT CHANGE UP
GLUCOSE BLDC GLUCOMTR-MCNC: 142 MG/DL — HIGH (ref 70–99)
GLUCOSE BLDC GLUCOMTR-MCNC: 269 MG/DL — HIGH (ref 70–99)
GLUCOSE BLDC GLUCOMTR-MCNC: 74 MG/DL — SIGNIFICANT CHANGE UP (ref 70–99)
GLUCOSE BLDC GLUCOMTR-MCNC: 86 MG/DL — SIGNIFICANT CHANGE UP (ref 70–99)
GLUCOSE SERPL-MCNC: 146 MG/DL — HIGH (ref 70–99)
HCT VFR BLD CALC: 38.1 % — SIGNIFICANT CHANGE UP (ref 34.5–45)
HGB BLD-MCNC: 12.1 G/DL — SIGNIFICANT CHANGE UP (ref 11.5–15.5)
MAGNESIUM SERPL-MCNC: 2.1 MG/DL — SIGNIFICANT CHANGE UP (ref 1.6–2.6)
MCHC RBC-ENTMCNC: 30 PG — SIGNIFICANT CHANGE UP (ref 27–34)
MCHC RBC-ENTMCNC: 31.8 GM/DL — LOW (ref 32–36)
MCV RBC AUTO: 94.5 FL — SIGNIFICANT CHANGE UP (ref 80–100)
NRBC # BLD: 0 /100 WBCS — SIGNIFICANT CHANGE UP (ref 0–0)
PHOSPHATE SERPL-MCNC: 2.9 MG/DL — SIGNIFICANT CHANGE UP (ref 2.5–4.5)
PLATELET # BLD AUTO: 195 K/UL — SIGNIFICANT CHANGE UP (ref 150–400)
POTASSIUM SERPL-MCNC: 4.6 MMOL/L — SIGNIFICANT CHANGE UP (ref 3.5–5.3)
POTASSIUM SERPL-SCNC: 4.6 MMOL/L — SIGNIFICANT CHANGE UP (ref 3.5–5.3)
RBC # BLD: 4.03 M/UL — SIGNIFICANT CHANGE UP (ref 3.8–5.2)
RBC # FLD: 12.9 % — SIGNIFICANT CHANGE UP (ref 10.3–14.5)
SODIUM SERPL-SCNC: 137 MMOL/L — SIGNIFICANT CHANGE UP (ref 135–145)
VIT B12 SERPL-MCNC: 1884 PG/ML — HIGH (ref 232–1245)
WBC # BLD: 14.78 K/UL — HIGH (ref 3.8–10.5)
WBC # FLD AUTO: 14.78 K/UL — HIGH (ref 3.8–10.5)

## 2023-07-20 PROCEDURE — 99233 SBSQ HOSP IP/OBS HIGH 50: CPT

## 2023-07-20 PROCEDURE — 99232 SBSQ HOSP IP/OBS MODERATE 35: CPT | Mod: GC

## 2023-07-20 RX ORDER — FOLIC ACID 0.8 MG
1 TABLET ORAL EVERY 24 HOURS
Refills: 0 | Status: DISCONTINUED | OUTPATIENT
Start: 2023-07-20 | End: 2023-07-24

## 2023-07-20 RX ORDER — INSULIN LISPRO 100/ML
14 VIAL (ML) SUBCUTANEOUS
Refills: 0 | Status: DISCONTINUED | OUTPATIENT
Start: 2023-07-20 | End: 2023-07-21

## 2023-07-20 RX ORDER — DEXAMETHASONE 0.5 MG/5ML
2 ELIXIR ORAL EVERY 12 HOURS
Refills: 0 | Status: DISCONTINUED | OUTPATIENT
Start: 2023-07-21 | End: 2023-07-24

## 2023-07-20 RX ORDER — PREGABALIN 225 MG/1
1000 CAPSULE ORAL ONCE
Refills: 0 | Status: COMPLETED | OUTPATIENT
Start: 2023-07-20 | End: 2023-07-20

## 2023-07-20 RX ADMIN — Medication 200 MILLIGRAM(S): at 07:49

## 2023-07-20 RX ADMIN — Medication 650 MILLIGRAM(S): at 23:50

## 2023-07-20 RX ADMIN — Medication 650 MILLIGRAM(S): at 23:11

## 2023-07-20 RX ADMIN — Medication 650 MILLIGRAM(S): at 12:47

## 2023-07-20 RX ADMIN — Medication 14 UNIT(S): at 18:40

## 2023-07-20 RX ADMIN — LIDOCAINE 1 PATCH: 4 CREAM TOPICAL at 20:30

## 2023-07-20 RX ADMIN — Medication 6: at 13:29

## 2023-07-20 RX ADMIN — Medication 1 MILLIGRAM(S): at 17:20

## 2023-07-20 RX ADMIN — PREGABALIN 1000 MICROGRAM(S): 225 CAPSULE ORAL at 17:21

## 2023-07-20 RX ADMIN — Medication 1000 MILLIGRAM(S): at 17:25

## 2023-07-20 RX ADMIN — Medication 4 MILLIGRAM(S): at 17:20

## 2023-07-20 RX ADMIN — Medication 200 MILLIGRAM(S): at 18:13

## 2023-07-20 RX ADMIN — LIDOCAINE 1 PATCH: 4 CREAM TOPICAL at 18:50

## 2023-07-20 RX ADMIN — Medication 100 MILLIGRAM(S): at 09:16

## 2023-07-20 RX ADMIN — Medication 1000 MILLIGRAM(S): at 07:49

## 2023-07-20 RX ADMIN — Medication 12 UNIT(S): at 13:29

## 2023-07-20 RX ADMIN — LOSARTAN POTASSIUM 100 MILLIGRAM(S): 100 TABLET, FILM COATED ORAL at 13:07

## 2023-07-20 RX ADMIN — Medication 4 MILLIGRAM(S): at 07:49

## 2023-07-20 RX ADMIN — Medication 12 UNIT(S): at 09:41

## 2023-07-20 RX ADMIN — LIDOCAINE 1 PATCH: 4 CREAM TOPICAL at 08:00

## 2023-07-20 NOTE — PROGRESS NOTE ADULT - PROBLEM SELECTOR PLAN 2
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  These findings are best viewed on the cellblock. They are morphologically similar to those seen in the left lung FNA.    - s/p bronchoscopy 7/14/23 Examination of the ThinPrep and cell block reveal malignant cells consistent with a non-small cell carcinoma.  These findings are best viewed on the cellblock. They are morphologically similar to those seen in the left lung FNA.  [id number], [id number]  - s/p bronchoscopy 7/14/23

## 2023-07-20 NOTE — DISCHARGE NOTE PROVIDER - NSDCFUADDAPPT_GEN_ALL_CORE_FT
Due to issues with transportation while in acute rehab, please reschedule your oncology/neurosurgery appointment for August 1st or August 8th depending on length of stay at acute rehab.    Please follow up with Dr. Wernicke for radiation oncology for further treatments.

## 2023-07-20 NOTE — PROGRESS NOTE ADULT - ASSESSMENT
73F with PMHx of HTN HLD, DM, R hip replacement, RA, CKD (Cr baseline ~2) presents to the  ED for unsteadiness and dizziness x1 week, underwent stroke workup with imaging findings negative for acute CVA however with incidental finding of brain mass suspicious for metastasis. Oncology consulted for malignancy workup.    Oncologic History:  - Presented on July 9th to [facility] for 1 week of dizziness, incidentally found to have brain mass  - MR Head w/wo (7/11/23) with 0.9 cm enhancing lesion in the right frontal lobe surrounding vasogenic  edema which is suspicious for intracranial metastasis.   - CT Chest/Abd/Pelvis with IVC (7/11/23) showing somewhat spiculated, proximate 2.6 x 4.1 cm mass is seen in the posterior aspect of the left upper lobe, 1.5 cm low-attenuation lesion is seen in the left lobe of the liver, and 1.2 cm pancreas body cystic lesion; possible IPMN (intraductal papillary mucinous neoplasm)  - EBUS (7/14/23) with sevearl biopsies including lymph node 7 (negative), left lung lingula (positive for NSCLC, favor adenocarcinoma), BAL (positive for malignant cells). 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  - Neurosurgery evaluated patient with dexamethasone taper. Will follow up with Dr. D'Amico for SRS planning  - Guardant 360 sent, pending  - NGS on biopsy pending  - Please check Hep B viral load given positive Core Antibody  - Please give a one time dose of B12 1000mcg IM and daily folic acid 1 mg (in preparation for potential systemic therapy)  - Full treatment options will be dependent on next generation sequencing of the tumor for targetable mutations and will be discussed at outpatient appointment. Patient given clinic contact information and appointment card for an appointment with Dr. Sedrick Delaney at [facility] scheduled for 7/25 at 10:20 AM. Please ensure that this information is provided in discharge paperwork and ensure that rehab schedules transportation    Discussed with hematology oncology attending Dr. Sedrick Delaney. Recommendations are considered final after attending attestation.

## 2023-07-20 NOTE — DISCHARGE NOTE PROVIDER - HOSPITAL COURSE
#Discharge: do not delete     Assessment	  73y Female with PMHx of HTN HLD, DM, R hip replacement, RA, CKD (Cr baseline ~2) presents to the  ED for unsteadiness and dizziness x1 week. LKN was a week ago. mRS of 2, walks with a rolling walker but is able to care for her basic everyday needs. NIH of 5 for L facial and dysarthria and LUE weakness. 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 Daughter at bedside states that she is at her baseline. LUE is pain limited to her rheumatoid arthritis but patient states that she definitely has noticed it to be slightly weaker than usual. Otherwise no numbness or tingling, no headache, N/V. CTH was completed in the ED concerning for age indeterminate L cerebellar lacunar infarcts, but no CVA. The patient was found to have an 8mm cortically based nodule in the posterior-superior frontal lobe suspicious for metastasis. Follow up CT chest showed left upper lobe somewhat spiculated mass, suspicious for neoplasm. Subsequently the patient underwent bronchoscopy with biopsy revealing NSLC adenocarcinoma. Patient was given a one time high dose IV steroid and tapered down to discharge. Steroids and continued consumption of outside food greatly increased blood sugars necessitating endocrine consult and diet adjustment.      # Brain lesion  MRA H/N without large vessel occlusion or HGS   MRI w/o contrast: An 8 mm cortically based nodule in the posterior-superior frontal lobe (motor strip) is present with extensive vasogenic edema. Metastasis or focus of atypical infection are leading differential considerations  Decadron 10 mg IV -> decadron 4 mg q6h -> decadron 4 mg BID -> decadron 2 mg BID    # Falls   Workup for falls with potential brain mass etiology  - ASA, Plavix, and Atorvastatin discontinued to prevent ICH    # Nonsquamous nonsmall cell neoplasm of lung  Examination of the ThinPrep and cell block reveal malignant cells consistent with a non-small cell carcinoma.  These findings are best viewed on the cellblock. They are morphologically similar to those seen in the left lung FNA.  Bahtlx99.5, B12 1884  - s/p bronchoscopy 7/14/23.  - s/p B12 1000 mcg    # HLD (hyperlipidemia).   LDL results: 69  -holding statin.    # DM (diabetes mellitus), type 2  Insulin basal bolus and prandial adjusted  A1C results: 7.4  - lispro 10 units TID with meals.    # Rheumatoid arthritis.   - c home Sulfasalazine and Plaquenil  - Ibuprofen and Tylenol PRN for pain.    # Stage 4 chronic kidney disease.   - baseline Cr 2  - voiding.    # HTN (hypertension).   - c BP meds, amlodipine 10mg, losartan 100mg, Toprol 100mg , uptitrate if needed  - TTE: moderate LVH, grossly unremarkable  - Stroke Code EKG Results: NSR      Patient was discharged to: [facility]    New medications: decadron 2 mg BID  Changes to old medications:  Medications that were stopped:    Items to follow up as outpatient: Oncology appointment 7/25/23    Physical exam at the time of discharge:           #Discharge: do not delete     Assessment	  73y Female with PMHx of HTN HLD, DM, R hip replacement, RA, CKD (Cr baseline ~2) presents to the  ED for unsteadiness and dizziness x1 week. LKN was a week ago. mRS of 2, walks with a rolling walker but is able to care for her basic everyday needs. NIH of 5 for L facial and dysarthria and LUE weakness. 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 Daughter at bedside states that she is at her baseline. LUE is pain limited to her rheumatoid arthritis but patient states that she definitely has noticed it to be slightly weaker than usual. Otherwise no numbness or tingling, no headache, N/V. CTH was completed in the ED concerning for age indeterminate L cerebellar lacunar infarcts, but no CVA. The patient was found to have an 8mm cortically based nodule in the posterior-superior frontal lobe suspicious for metastasis. Follow up CT chest showed left upper lobe somewhat spiculated mass, suspicious for neoplasm. Subsequently the patient underwent bronchoscopy with biopsy revealing NSLC adenocarcinoma. Patient was given a one time high dose IV steroid and tapered down to discharge. Steroids and continued consumption of outside food greatly increased blood sugars necessitating endocrine consult and diet adjustment.      # Brain lesion  MRA H/N without large vessel occlusion or HGS   MRI w/o contrast: An 8 mm cortically based nodule in the posterior-superior frontal lobe (motor strip) is present with extensive vasogenic edema. Metastasis or focus of atypical infection are leading differential considerations  Decadron 10 mg IV -> decadron 4 mg q6h -> decadron 4 mg BID -> decadron 2 mg BID    # Falls   Workup for falls with potential brain mass etiology  - ASA, Plavix, and Atorvastatin discontinued to prevent ICH    # Nonsquamous nonsmall cell neoplasm of lung  Examination of the ThinPrep and cell block reveal malignant cells consistent with a non-small cell carcinoma.  These findings are best viewed on the cellblock. They are morphologically similar to those seen in the left lung FNA.  Lvgeqq47.5, B12 1884  - s/p bronchoscopy 7/14/23.  - vitamin B12 and folate supplementation before initiation of cancer treatment     # HLD (hyperlipidemia).   LDL results: 69  -holding statin.    # DM (diabetes mellitus), type 2  Insulin basal bolus and prandial adjusted  A1C results: 7.4  - lispro 10 units TID with meals.    # Rheumatoid arthritis.   - c home Sulfasalazine and Plaquenil  - Ibuprofen and Tylenol PRN for pain.    # Stage 4 chronic kidney disease.   - baseline Cr 2  - voiding.    # HTN (hypertension).   - c BP meds, amlodipine 10mg, losartan 100mg, Toprol 100mg , uptitrate if needed  - TTE: moderate LVH, grossly unremarkable  - Stroke Code EKG Results: NSR      Patient was discharged to: [facility]    New medications: decadron 2 mg BID  Changes to old medications:  Medications that were stopped:    Items to follow up as outpatient: Oncology appointment 7/25/23    Physical exam at the time of discharge:           #Discharge: do not delete     Assessment	  73y Female with PMHx of HTN HLD, DM, R hip replacement, RA, CKD (Cr baseline ~2) presents to the  ED for unsteadiness and dizziness x1 week. LKN was a week ago. mRS of 2, walks with a rolling walker but is able to care for her basic everyday needs. NIH of 5 for L facial and dysarthria and LUE weakness. 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 Daughter at bedside states that she is at her baseline. LUE is pain limited to her rheumatoid arthritis but patient states that she definitely has noticed it to be slightly weaker than usual. Otherwise no numbness or tingling, no headache, N/V. CTH was completed in the ED concerning for age indeterminate L cerebellar lacunar infarcts, but no CVA. The patient was found to have an 8mm cortically based nodule in the posterior-superior frontal lobe suspicious for metastasis. Follow up CT chest showed left upper lobe somewhat spiculated mass, suspicious for neoplasm. Subsequently the patient underwent bronchoscopy with biopsy revealing NSLC adenocarcinoma. Patient was given a one time high dose IV steroid and tapered down to discharge. Steroids and continued consumption of outside food greatly increased blood sugars necessitating endocrine consult and diet adjustment.      # Brain lesion  MRA H/N without large vessel occlusion or HGS   MRI w/o contrast: An 8 mm cortically based nodule in the posterior-superior frontal lobe (motor strip) is present with extensive vasogenic edema. Metastasis or focus of atypical infection are leading differential considerations  Decadron 10 mg IV -> decadron 4 mg q6h -> decadron 4 mg BID -> decadron 2 mg BID    # Falls   Workup for falls with potential brain mass etiology  - ASA, Plavix, and Atorvastatin discontinued to prevent ICH    # Nonsquamous nonsmall cell neoplasm of lung  Examination of the ThinPrep and cell block reveal malignant cells consistent with a non-small cell carcinoma.  These findings are best viewed on the cellblock. They are morphologically similar to those seen in the left lung FNA.  Lmdpqx08.5, B12 1884  - s/p bronchoscopy 7/14/23.  - vitamin B12 and folate supplementation before initiation of cancer treatment     # HLD (hyperlipidemia).   LDL results: 69  -holding statin.    # DM (diabetes mellitus), type 2  Insulin basal bolus and prandial adjusted  A1C results: 7.4  - lispro 10 units TID with meals.    # Rheumatoid arthritis.   - c home Sulfasalazine and Plaquenil  - Ibuprofen and Tylenol PRN for pain.    # Stage 4 chronic kidney disease.   - baseline Cr 2  - voiding.    # HTN (hypertension).   - c BP meds, amlodipine 10mg, losartan 100mg, Toprol 100mg , uptitrate if needed  - TTE: moderate LVH, grossly unremarkable  - Stroke Code EKG Results: NSR      Patient was discharged to: [facility]    New medications: decadron 2 mg BID  Changes to old medications: lispro 10 units with regular sized meals, lispro 5 units with small meals  Medications that were stopped:    Items to follow up as outpatient: Oncology appointment 7/25/23    Physical exam at the time of discharge:           #Discharge: do not delete     Assessment	  73y Female with PMHx of HTN HLD, DM, R hip replacement, RA, CKD (Cr baseline ~2) presents to the  ED for unsteadiness and dizziness x1 week. LKN was a week ago. mRS of 2, walks with a rolling walker but is able to care for her basic everyday needs. NIH of 5 for L facial and dysarthria and LUE weakness. 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 Daughter at bedside states that she is at her baseline. LUE is pain limited to her rheumatoid arthritis but patient states that she definitely has noticed it to be slightly weaker than usual. Otherwise no numbness or tingling, no headache, N/V. CTH was completed in the ED concerning for age indeterminate L cerebellar lacunar infarcts, but no CVA. The patient was found to have an 8mm cortically based nodule in the posterior-superior frontal lobe suspicious for metastasis. Follow up CT chest showed left upper lobe somewhat spiculated mass, suspicious for neoplasm. Subsequently the patient underwent bronchoscopy with biopsy revealing NSLC adenocarcinoma. Patient was given a one time high dose IV steroid and tapered down to discharge. Steroids and continued consumption of outside food greatly increased blood sugars necessitating endocrine consult and diet adjustment.      # Brain lesion  MRA H/N without large vessel occlusion or HGS   MRI w/o contrast: An 8 mm cortically based nodule in the posterior-superior frontal lobe (motor strip) is present with extensive vasogenic edema. Metastasis or focus of atypical infection are leading differential considerations  Decadron 10 mg IV -> decadron 4 mg q6h -> decadron 4 mg BID -> decadron 2 mg BID    # Falls   Workup for falls with potential brain mass etiology  - ASA, Plavix, and Atorvastatin discontinued to prevent ICH    # Nonsquamous nonsmall cell neoplasm of lung  Examination of the ThinPrep and cell block reveal malignant cells consistent with a non-small cell carcinoma.  These findings are best viewed on the cellblock. They are morphologically similar to those seen in the left lung FNA.  Asohgm53.5, B12 1884  - s/p bronchoscopy 7/14/23.  - vitamin B12 and folate supplementation before initiation of cancer treatment     # HLD (hyperlipidemia).   LDL results: 69  -holding statin.    # DM (diabetes mellitus), type 2  Insulin basal bolus and prandial adjusted  A1C results: 7.4  - lispro 10 units TID with meals.    # Rheumatoid arthritis.   - c home Sulfasalazine and Plaquenil  - Ibuprofen and Tylenol PRN for pain.    # Stage 4 chronic kidney disease.   - baseline Cr 2  - voiding.    # HTN (hypertension).   - c BP meds, amlodipine 10mg, losartan 100mg, Toprol 100mg , uptitrate if needed  - TTE: moderate LVH, grossly unremarkable  - Stroke Code EKG Results: NSR      Patient was discharged to: [facility]    New medications: decadron 2 mg BID  Changes to old medications: lispro 10 units with regular sized meals, lispro 5 units with small meals  Medications that were stopped: hydralazine home medication    Items to follow up as outpatient: Oncology appointment 7/25/23    Physical exam at the time of discharge:  General: NAD  Head: NC/AT; PERRL; EOMI;  Neck: Supple; no JVD  Respiratory: CTAB; no wheezes  Cardiovascular: Regular rhythm/rate; S1/S2+, no murmurs  Gastrointestinal: Soft; NTND;   Extremities: WWP; no edema, improving range of motion of left shoulder  Neurological: A&Ox3, CNII-XII grossly intact; no obvious focal deficits

## 2023-07-20 NOTE — DISCHARGE NOTE PROVIDER - NSDCCPTREATMENT_GEN_ALL_CORE_FT
PRINCIPAL PROCEDURE  Procedure: Bronchoscopy  Findings and Treatment: Bronchoscopy is usually done to find the cause of a lung problem. For example, your doctor might refer you for bronchoscopy because you have a persistent cough or an abnormal chest X-ray.  Reasons for doing bronchoscopy include:  Diagnosis of a lung problem  Identification of a lung infection  Biopsy of tissue from the lung  Removal of mucus, a foreign body, or other obstruction in the airways or lungs, such as a tumor  Placement of a small tube to hold open an airway (stent)  Treatment of a lung problem (interventional bronchoscopy), such as bleeding, an abnormal narrowing of the airway (stricture) or a collapsed lung (pneumothorax)  During some procedures, special devices may be passed through the bronchoscope, such as a tool to obtain a biopsy, an electrocautery probe to control bleeding or a laser to reduce the size of an airway tumor. Special techniques are used to guide the collection of biopsies to ensure the desired area of the lung is sampled.  In people with lung cancer, a bronchoscope with a built-in ultrasound probe may be used to check the lymph nodes in the chest. This is called endobronchial ultrasound (EBUS) and helps doctors determine the appropriate treatment. EBUS may be used for other types of cancer to determine if the cancer has spread.

## 2023-07-20 NOTE — CHART NOTE - NSCHARTNOTEFT_GEN_A_CORE
Admitting Diagnosis:   Patient is a 73y old  Female who presents with a chief complaint of Stroke (20 Jul 2023 10:43)      PAST MEDICAL & SURGICAL HISTORY:  HTN (hypertension)  HLD (hyperlipidemia)  DM (diabetes mellitus), type 2  Rheumatoid arthritis  Stage 4 chronic kidney disease  Degenerative joint disease (DJD) of lumbar spine  Osteopenia  S/P total right hip arthroplasty      Current Nutrition Order: Consistent Carbohydrate        PO Intake: Good (%) [   ]  Fair (50-75%) [   ] Poor (<25%) [   ]    GI Issues:     Pain:    Skin Integrity:    Labs:   07-20    137  |  105  |  48<H>  ----------------------------<  146<H>  4.6   |  24  |  1.42<H>    Ca    7.6<L>      20 Jul 2023 05:30  Phos  2.9     07-20  Mg     2.1     07-20      CAPILLARY BLOOD GLUCOSE      POCT Blood Glucose.: 269 mg/dL (20 Jul 2023 12:56)  POCT Blood Glucose.: 86 mg/dL (20 Jul 2023 09:33)  POCT Blood Glucose.: 416 mg/dL (19 Jul 2023 22:08)  POCT Blood Glucose.: 87 mg/dL (19 Jul 2023 17:55)      Medications:  MEDICATIONS  (STANDING):  amLODIPine   Tablet 10 milliGRAM(s) Oral every 24 hours  dexAMETHasone     Tablet 4 milliGRAM(s) Oral two times a day  dextrose 5%. 1000 milliLiter(s) (100 mL/Hr) IV Continuous <Continuous>  dextrose 50% Injectable 25 Gram(s) IV Push once  dextrose 50% Injectable 12.5 Gram(s) IV Push once  glucagon  Injectable 1 milliGRAM(s) IntraMuscular once  hydroxychloroquine 200 milliGRAM(s) Oral every 12 hours  ibuprofen  Tablet. 600 milliGRAM(s) Oral once  insulin glargine Injectable (LANTUS) 20 Unit(s) SubCutaneous at bedtime  insulin lispro (ADMELOG) corrective regimen sliding scale   SubCutaneous Before meals and at bedtime  insulin lispro Injectable (ADMELOG) 12 Unit(s) SubCutaneous three times a day before meals  lidocaine   4% Patch 1 Patch Transdermal every 24 hours  losartan 100 milliGRAM(s) Oral every 24 hours  metoprolol succinate  milliGRAM(s) Oral every 24 hours  sulfaSALAzine 1000 milliGRAM(s) Oral every 12 hours    MEDICATIONS  (PRN):  acetaminophen     Tablet .. 650 milliGRAM(s) Oral every 6 hours PRN Temp greater or equal to 38.5C (101.3F), Mild Pain (1 - 3), Moderate Pain (4 - 6)  dextrose Oral Gel 15 Gram(s) Oral once PRN Blood Glucose LESS THAN 70 milliGRAM(s)/deciliter      Weight:  Daily     Daily     Weight Change:     Nutrition Focused Physical Exam: Completed [   ]  Not Pertinent [   ]  Muscle Wasting- Temporal [   ]  Clavicle/Pectoral [   ]  Shoulder/Deltoid [   ]  Scapula [   ]  Interosseous [   ]  Quadriceps [   ]  Gastrocnemius [   ]  Fat Wasting- Orbital [   ]  Buccal [   ]  Triceps [   ]  Rib [   ]  Suspect [PCM] 2/2 to physical assessment, [poor intake], and [wt loss]; please see malnutrition chart note.    Estimated energy needs:     Subjective:     Previous Nutrition Diagnosis:    Active [   ]  Resolved [   ]    If resolved, new PES:     Goal:    Recommendations:    Education:     Risk Level: High [   ] Moderate [   ] Low [   ] Admitting Diagnosis:   Patient is a 73y old  Female who presents with a chief complaint of Stroke (20 Jul 2023 10:43)      PAST MEDICAL & SURGICAL HISTORY:  HTN (hypertension)  HLD (hyperlipidemia)  DM (diabetes mellitus), type 2  Rheumatoid arthritis  Stage 4 chronic kidney disease  Degenerative joint disease (DJD) of lumbar spine  Osteopenia  S/P total right hip arthroplasty      Current Nutrition Order: Consistent Carbohydrate        PO Intake: Good (%) [ x ]  Fair (50-75%) [   ] Poor (<25%) [   ]    GI Issues: Denies N/V/D/C, last BM 7/17    Skin Integrity: No pressure injuries documented, noted with +1 BL leg edema     Labs:   07-20    137  |  105  |  48<H>  ----------------------------<  146<H>  4.6   |  24  |  1.42<H>    Ca    7.6<L>      20 Jul 2023 05:30  Phos  2.9     07-20  Mg     2.1     07-20      CAPILLARY BLOOD GLUCOSE      POCT Blood Glucose.: 269 mg/dL (20 Jul 2023 12:56)  POCT Blood Glucose.: 86 mg/dL (20 Jul 2023 09:33)  POCT Blood Glucose.: 416 mg/dL (19 Jul 2023 22:08)  POCT Blood Glucose.: 87 mg/dL (19 Jul 2023 17:55)      Medications:  MEDICATIONS  (STANDING):  amLODIPine   Tablet 10 milliGRAM(s) Oral every 24 hours  dexAMETHasone     Tablet 4 milliGRAM(s) Oral two times a day  dextrose 5%. 1000 milliLiter(s) (100 mL/Hr) IV Continuous <Continuous>  dextrose 50% Injectable 25 Gram(s) IV Push once  dextrose 50% Injectable 12.5 Gram(s) IV Push once  glucagon  Injectable 1 milliGRAM(s) IntraMuscular once  hydroxychloroquine 200 milliGRAM(s) Oral every 12 hours  ibuprofen  Tablet. 600 milliGRAM(s) Oral once  insulin glargine Injectable (LANTUS) 20 Unit(s) SubCutaneous at bedtime  insulin lispro (ADMELOG) corrective regimen sliding scale   SubCutaneous Before meals and at bedtime  insulin lispro Injectable (ADMELOG) 12 Unit(s) SubCutaneous three times a day before meals  lidocaine   4% Patch 1 Patch Transdermal every 24 hours  losartan 100 milliGRAM(s) Oral every 24 hours  metoprolol succinate  milliGRAM(s) Oral every 24 hours  sulfaSALAzine 1000 milliGRAM(s) Oral every 12 hours    MEDICATIONS  (PRN):  acetaminophen     Tablet .. 650 milliGRAM(s) Oral every 6 hours PRN Temp greater or equal to 38.5C (101.3F), Mild Pain (1 - 3), Moderate Pain (4 - 6)  dextrose Oral Gel 15 Gram(s) Oral once PRN Blood Glucose LESS THAN 70 milliGRAM(s)/deciliter      Anthropometrics:  Dosing height: 60in  Dosing weight: 68.8kg/151.4#  BMI: 29.3    Weight Change: No new weights obtained since admit     Estimated energy needs:   Weight used for calculations	IBW  Estimated Energy Needs Weight (lbs)	115 lb  Estimated Energy Needs Weight (kg)	52.1 kg  Estimated Energy Needs From (shonda/kg)	25  Estimated Energy Needs To (shonda/kg)	30  Estimated Energy Needs Calculated From (shonda/kg)	1302  Estimated Energy Needs Calculated To (shonda/kg)	1563  Weight used for calculations	IBW  Estimated Protein Needs Weight (lbs)	115 lb  Estimated Protein Needs Weight (kg)	52.1 kg  Estimated Protein Needs From (g/kg)	0.8  Estimated Protein Needs To (g/kg)	1  Estimated Protein Needs Calculated From (g/kg)	41.68  Estimated Protein Needs Calculated To (g/kg)	52.1  Estimated Fluid Needs Weight (lbs)	115 lb  Estimated Fluid Needs Weight (kg)	52.1 kg  Estimated Fluid Needs From (ml/kg)	25  Estimated Fluid Needs To (ml/kg)	30  Estimated Fluid Needs Calculated From (ml/kg)	1302  Estimated Fluid Needs Calculated To (ml/kg)	1563  Other Calculations	Estimations based on IBW as patient >120% IBW    Subjective:     Previous Nutrition Diagnosis:  73y Female with PMHx of HTN HLD, DM, R hip replacement, RA, CKD (Cr baseline ~2) presents to the  ED for unsteadiness and dizziness x1 week. LKN was a week ago. mRS of 2, walks with a rolling walker but is able to care for her basic everyday needs. NIH of 5 for L facial and dysarthria and LUE weakness. 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 Daughter at bedside states that she is at her baseline. LUE is pain limited to her rheumatoid arthritis but patient states that she definitely has noticed it to be slightly weaker than usual. Otherwise no numbness or tingling, no headache, N/V. CTH was completed in the ED concerning for age indeterminate L cerebellar lacunar infarcts.    Pt seen at bedside for follow up assessment. Continues on CSTCHO diet with good PO intake. Pt consumed 100% of breakfast which included pound cake, turkey wyatt, yogurt, and fruit. No specific food preferences expressed, pt happy with current menu offerings. RD continued to emphasize consistent CHO intake and general healthful diet upon discharge, pt expressed understanding. No complaints of N/V/D/C, last BM 7/17. No pressure injuries documented, noted with +1 BL leg edema. No new weights since admit, recommend to obtain. Labs: Elevated BUN/Cr, BG - on insulin regimen. RD to remain available for additional nutrition interventions as needed.     Previous Nutrition Diagnosis: Altered Nutrition Related Lab Values related to diabetes dx AEB elevated glucose levels     Active [ x ]  Resolved [   ]    If resolved, new PES:     Goal: Patient will maintain glucose levels within acceptable range     Recommendations:  1. Continue with diet order   2. Monitor PO intake and encourage pt to meet needs as able  3. Monitor labs: electrolytes, cmp, finger stick  4. Monitor weights   5. Pain and bowel regimen per team   6. Will continue to assess/honor food preferences as able  7. Monitor adherence to diet education       Risk Level: High [   ] Moderate [ x ] Low [   ]

## 2023-07-20 NOTE — DISCHARGE NOTE PROVIDER - NSDCFUSCHEDAPPT_GEN_ALL_CORE_FT
Sedrick Delaney  [facility]  [address] [address]  Scheduled Appointment: 07/25/2023    Anjana Johnston  [facility]  [address] [address]  Scheduled Appointment: 07/25/2023    Marissa Douglass  [facility]  [facility] [address]  Scheduled Appointment: 08/22/2023

## 2023-07-20 NOTE — PROGRESS NOTE ADULT - SUBJECTIVE AND OBJECTIVE BOX
Hematology Oncology Progress Note      HPI:   **STROKE HPI***    HPI: 73y Female with PMHx of HTN HLD, DM, R hip replacement, RA, CKD (Cr baseline ~2) presents to the  ED for unsteadiness and dizziness x1 week. LKN was a week ago. mRS of 2, walks with a rolling walker but is able to care for her basic everyday needs. NIH of 5 for L facial and dysarthria and LUE weakness. 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 Daughter at bedside states that she is at her baseline. LUE is pain limited to her rheumatoid arthritis but patient states that she definitely has noticed it to be slightly weaker than usual. Otherwise no numbness or tingling, no headache, N/V. CTH was completed in the ED concerning for age indeterminate L cerebellar lacunar infarcts.       (09 Jul 2023 03:04)      SUBJECTIVE: Patient seen and examined at bedside. Denies fever, headache, nausea, vomiting, chest pain, shortness of breath, abdominal pain, changes in bowel movements or urination.     OBJECTIVE:    VITAL SIGNS:  ICU Vital Signs Last 24 Hrs  T(C): 37.3 (20 Jul 2023 12:00), Max: 37.3 (20 Jul 2023 12:00)  T(F): 99.2 (20 Jul 2023 12:00), Max: 99.2 (20 Jul 2023 12:00)  HR: 79 (20 Jul 2023 13:04) (57 - 79)  BP: 134/79 (20 Jul 2023 13:04) (105/63 - 158/81)  BP(mean): --  ABP: --  ABP(mean): --  RR: 18 (20 Jul 2023 12:00) (17 - 18)  SpO2: 96% (20 Jul 2023 12:00) (96% - 99%)    O2 Parameters below as of 20 Jul 2023 12:00  Patient On (Oxygen Delivery Method): room air              CAPILLARY BLOOD GLUCOSE      POCT Blood Glucose.: 269 mg/dL (20 Jul 2023 12:56)      PHYSICAL EXAM:  General: NAD, answering questions, pleasantly conversant  HEENT: NC/AT; PERRL, clear conjunctiva  Neck: supple  Respiratory: CTA b/l  Cardiovascular: +S1/S2; RRR  Abdomen: soft, NT/ND; +BS x4  Extremities: WWP, 2+ peripheral pulses b/l; no LE edema  Skin: normal color and turgor; no rash  Neurological: AAOX3    MEDICATIONS:  MEDICATIONS  (STANDING):  amLODIPine   Tablet 10 milliGRAM(s) Oral every 24 hours  dexAMETHasone     Tablet 4 milliGRAM(s) Oral two times a day  dextrose 5%. 1000 milliLiter(s) (100 mL/Hr) IV Continuous <Continuous>  dextrose 50% Injectable 12.5 Gram(s) IV Push once  dextrose 50% Injectable 25 Gram(s) IV Push once  glucagon  Injectable 1 milliGRAM(s) IntraMuscular once  hydroxychloroquine 200 milliGRAM(s) Oral every 12 hours  ibuprofen  Tablet. 600 milliGRAM(s) Oral once  insulin glargine Injectable (LANTUS) 20 Unit(s) SubCutaneous at bedtime  insulin lispro (ADMELOG) corrective regimen sliding scale   SubCutaneous Before meals and at bedtime  insulin lispro Injectable (ADMELOG) 12 Unit(s) SubCutaneous three times a day before meals  lidocaine   4% Patch 1 Patch Transdermal every 24 hours  losartan 100 milliGRAM(s) Oral every 24 hours  metoprolol succinate  milliGRAM(s) Oral every 24 hours  sulfaSALAzine 1000 milliGRAM(s) Oral every 12 hours    MEDICATIONS  (PRN):  acetaminophen     Tablet .. 650 milliGRAM(s) Oral every 6 hours PRN Temp greater or equal to 38.5C (101.3F), Mild Pain (1 - 3), Moderate Pain (4 - 6)  dextrose Oral Gel 15 Gram(s) Oral once PRN Blood Glucose LESS THAN 70 milliGRAM(s)/deciliter      MEDICAL/SURGICAL Hx:  PAST MEDICAL & SURGICAL HISTORY:  HTN (hypertension)      HLD (hyperlipidemia)      DM (diabetes mellitus), type 2      Rheumatoid arthritis      Stage 4 chronic kidney disease      Degenerative joint disease (DJD) of lumbar spine      Osteopenia      S/P total right hip arthroplasty          ALLERGIES:  Allergies    No Known Allergies    Intolerances        LABS:                        12.1   14.78 )-----------( 195      ( 20 Jul 2023 05:30 )             38.1     07-20    137  |  105  |  48<H>  ----------------------------<  146<H>  4.6   |  24  |  1.42<H>    Ca    7.6<L>      20 Jul 2023 05:30  Phos  2.9     07-20  Mg     2.1     07-20        Urinalysis Basic - ( 20 Jul 2023 05:30 )    Color: x / Appearance: x / SG: x / pH: x  Gluc: 146 mg/dL / Ketone: x  / Bili: x / Urobili: x   Blood: x / Protein: x / Nitrite: x   Leuk Esterase: x / RBC: x / WBC x   Sq Epi: x / Non Sq Epi: x / Bacteria: x            RADIOLOGY, PATHOLOGY, & ADDITIONAL TESTS: Reviewed.

## 2023-07-20 NOTE — PROGRESS NOTE ADULT - SUBJECTIVE AND OBJECTIVE BOX
INTERVAL HPI/OVERNIGHT EVENTS:  No complaint  Oncology follow up noted;  Will go to Rehab  Glucoses still poorly controlled;  Would like Endocrine to followup         MEDICATIONS  (STANDING):  amLODIPine   Tablet 10 milliGRAM(s) Oral every 24 hours  dexAMETHasone     Tablet 4 milliGRAM(s) Oral two times a day  dextrose 5%. 1000 milliLiter(s) (100 mL/Hr) IV Continuous <Continuous>  dextrose 50% Injectable 12.5 Gram(s) IV Push once  dextrose 50% Injectable 25 Gram(s) IV Push once  glucagon  Injectable 1 milliGRAM(s) IntraMuscular once  hydroxychloroquine 200 milliGRAM(s) Oral every 12 hours  ibuprofen  Tablet. 600 milliGRAM(s) Oral once  insulin glargine Injectable (LANTUS) 20 Unit(s) SubCutaneous at bedtime  insulin lispro (ADMELOG) corrective regimen sliding scale   SubCutaneous Before meals and at bedtime  insulin lispro Injectable (ADMELOG) 12 Unit(s) SubCutaneous three times a day before meals  lidocaine   4% Patch 1 Patch Transdermal every 24 hours  losartan 100 milliGRAM(s) Oral every 24 hours  metoprolol succinate  milliGRAM(s) Oral every 24 hours  sulfaSALAzine 1000 milliGRAM(s) Oral every 12 hours    MEDICATIONS  (PRN):  acetaminophen     Tablet .. 650 milliGRAM(s) Oral every 6 hours PRN Temp greater or equal to 38.5C (101.3F), Mild Pain (1 - 3), Moderate Pain (4 - 6)  dextrose Oral Gel 15 Gram(s) Oral once PRN Blood Glucose LESS THAN 70 milliGRAM(s)/deciliter      Allergies    No Known Allergies    Intolerances        Vital Signs Last 24 Hrs  T(C): 36.9 (20 Jul 2023 06:40), Max: 37 (19 Jul 2023 21:09)  T(F): 98.4 (20 Jul 2023 06:40), Max: 98.6 (19 Jul 2023 21:09)  HR: 71 (20 Jul 2023 09:11) (57 - 71)  BP: 146/86 (20 Jul 2023 09:11) (105/63 - 146/86)  BP(mean): --  RR: 17 (20 Jul 2023 06:40) (17 - 18)  SpO2: 99% (20 Jul 2023 06:40) (95% - 99%)    Parameters below as of 20 Jul 2023 06:40  Patient On (Oxygen Delivery Method): room air              Constitutional:    Eyes: NEDRA    ENMT: Negative    Neck: Supple    Back:  no tenderness     Respiratory:  clear    Cardiovascular: S1 S2    Gastrointestinal:  soft     Genitourinary:    Extremities:  no edema     Vascular:    Neurological:    Skin:    Lymph Nodes:            LABS:                        12.1   14.78 )-----------( 195      ( 20 Jul 2023 05:30 )             38.1     07-20    137  |  105  |  48<H>  ----------------------------<  146<H>  4.6   |  24  |  1.42<H>    Ca    7.6<L>      20 Jul 2023 05:30  Phos  2.9     07-20  Mg     2.1     07-20        Urinalysis Basic - ( 20 Jul 2023 05:30 )    Color: x / Appearance: x / SG: x / pH: x  Gluc: 146 mg/dL / Ketone: x  / Bili: x / Urobili: x   Blood: x / Protein: x / Nitrite: x   Leuk Esterase: x / RBC: x / WBC x   Sq Epi: x / Non Sq Epi: x / Bacteria: x        RADIOLOGY & ADDITIONAL TESTS:

## 2023-07-20 NOTE — DISCHARGE NOTE PROVIDER - NSDCMRMEDTOKEN_GEN_ALL_CORE_FT
amLODIPine 10 mg oral tablet: 1 tab(s) orally once a day  aspirin 81 mg oral tablet, chewable: 1 tab(s) orally once a day  calcium carbonate 500 mg (200 mg elemental calcium) oral tablet, chewable: 1 tab(s) orally 2 times a day  Farxiga 5 mg oral tablet: 1 tab(s) orally once a day  ferrous sulfate 324 mg (65 mg elemental iron) oral delayed release tablet: 1 tab(s) orally once a day  folic acid 1 mg oral tablet: 1 tab(s) orally once a day  hydrALAZINE 25 mg oral tablet: 1 tab(s) orally 2 times a day  hydroxychloroquine 200 mg oral tablet: 1 tab(s) orally 2 times a day  Januvia 50 mg oral tablet: 1 tab(s) orally once a day  Levemir 100 units/mL subcutaneous solution: 12 unit(s) subcutaneous once a day (at bedtime)  Lipitor 40 mg oral tablet: 1 tab(s) orally once a day  losartan 100 mg oral tablet: 1 tab(s) orally once a day  metoprolol succinate 100 mg oral tablet, extended release: 1 tab(s) orally once a day  Multiple Vitamins oral capsule: 1 cap(s) orally once a day  NovoLOG 100 units/mL injectable solution: 5 unit(s) injectable 3 times a day  Oxaydo 5 mg oral tablet: 1 tab(s) orally every 4 hours, As needed, Moderate Pain (4 - 6)  spironolactone 25 mg oral tablet: 1 tab(s) orally once a day  sulfaSALAzine 500 mg oral tablet: 2 tab(s) orally 2 times a day  Tylenol 500 mg oral tablet: 1 tab(s) orally every 6 hours, As Needed  Vitamin B12 1000 mcg oral tablet: 1 tab(s) orally once a day  Vitamin C 500 mg oral tablet: 1 tab(s) orally once a day  Vitamin D3 25 mcg (1000 intl units) oral tablet: 1 tab(s) orally once a day   Admelog 100 units/mL injectable solution: 2 unit(s) injectable 3 times a day (with meals) as needed for hyperglycemia 2 Unit(s) if Glucose 151 - 200  4 Unit(s) if Glucose 201 - 250  6 Unit(s) if Glucose 251 - 300  8 Unit(s) if Glucose 301 - 350  10 Unit(s) if Glucose 351 - 400  12 Unit(s) if Glucose Greater Than 400  Admelog 100 units/mL injectable solution: 5 unit(s) injectable 3 times a day (with meals) 5 units with small meals, 10 units with normal sized meals  amLODIPine 10 mg oral tablet: 1 tab(s) orally once a day  aspirin 81 mg oral tablet, chewable: 1 tab(s) orally once a day  calcium carbonate 500 mg (200 mg elemental calcium) oral tablet, chewable: 1 tab(s) orally 2 times a day  dexAMETHasone 2 mg oral tablet: 1 tab(s) orally every 12 hours  ferrous sulfate 324 mg (65 mg elemental iron) oral delayed release tablet: 1 tab(s) orally once a day  folic acid 1 mg oral tablet: 1 tab(s) orally once a day  hydroxychloroquine 200 mg oral tablet: 1 tab(s) orally 2 times a day  lidocaine 4% topical film: Apply topically to affected area once a day  Lipitor 40 mg oral tablet: 1 tab(s) orally once a day  losartan 100 mg oral tablet: 1 tab(s) orally once a day  metoprolol succinate 100 mg oral tablet, extended release: 1 tab(s) orally once a day  Multiple Vitamins oral capsule: 1 cap(s) orally once a day  Oxaydo 5 mg oral tablet: 1 tab(s) orally every 4 hours, As needed, Moderate Pain (4 - 6)  spironolactone 25 mg oral tablet: 1 tab(s) orally once a day  sulfaSALAzine 500 mg oral tablet: 2 tab(s) orally 2 times a day  Tylenol 500 mg oral tablet: 1 tab(s) orally every 6 hours, As Needed  Vitamin B12 1000 mcg oral tablet: 1 tab(s) orally once a day  Vitamin C 500 mg oral tablet: 1 tab(s) orally once a day  Vitamin D3 25 mcg (1000 intl units) oral tablet: 1 tab(s) orally once a day

## 2023-07-20 NOTE — PROGRESS NOTE ADULT - PROBLEM SELECTOR PLAN 5
Insulin basal bolus and prandial adjusted  - A1C results: 7.4  - insulin glargine Injectable (LANTUS) 20 Unit(s) SubCutaneous at bedtime  - insulin lispro Injectable (ADMELOG) 12 Unit(s) SubCutaneous three times a day before meals  - insulin lispro (ADMELOG) corrective regimen sliding scale   SubCutaneous three times a day before meals Insulin basal bolus and prandial adjusted  - A1C results: 7.4  - insulin glargine Injectable (LANTUS) 20 Unit(s) SubCutaneous at bedtime  - insulin lispro Injectable (ADMELOG) 12 Unit(s) SubCutaneous three times a day before meals  - insulin lispro (ADMELOG) corrective regimen sliding scale   SubCutaneous three times a day before meals  - endocrine consulted to gain better glycemic control prior to discharge

## 2023-07-20 NOTE — DISCHARGE NOTE PROVIDER - NSDCCPCAREPLAN_GEN_ALL_CORE_FT
PRINCIPAL DISCHARGE DIAGNOSIS  Diagnosis: Adenocarcinoma, lung  Assessment and Plan of Treatment:      PRINCIPAL DISCHARGE DIAGNOSIS  Diagnosis: Adenocarcinoma, lung  Assessment and Plan of Treatment: Cancer is an abnormal growth of cells. The whole body is made of cells that act and grow in controlled ways as the body needs them. The cells are controlled by genes. The genes in any one  of these cells can become damaged. Then the cell can grow out of control and become cancer.  Cancer cells quickly grow and divide. This happens even when there's not enough space and nutrients. They also grow despite signals sent from the body telling them to stop.  Cancer cells don't look the same as healthy cells. They don't work the way they should. They can spread to other parts of the body. Tumors, masses, or lesions are names for abnormal growths of cells that can become cancer.  When a person is diagnosed with lung cancer, the abnormal growth of cells is happening in the lungs or the cancer is somewhere else in the body and has traveled to the lungs. This process of spreading through the body to a secondary organ or site is metastasis.   What is staging for lung cancer?  Staging is the process of finding out how far the cancer has spread (metastasized). This information will determine the treatment options and the probable course of the cancer and the chance of your recovery from it (prognosis). Numerous tests may be performed to determine the stage.  The TNM system is commonly used. Each letter, together with a number after it, gives more details.  • "T" describes the tumor size and whether the cancer has spread to nearby tissues.  • "N" describes if, or how far, the cancer has spread to the lymph nodes (pea-sized "glands" that filter impurities from the body).  • "M" tells whether the cancer has spread to other organs of the body.  These TNM descriptions are the grouped together into stages 1 (early) through 4 (advanced). In general, the lower the number the better prognosis.

## 2023-07-20 NOTE — PROGRESS NOTE ADULT - SUBJECTIVE AND OBJECTIVE BOX
Interval History:    O/N events:    Subjective: Overnight events and interval history: no acute events overnight. Patient made aware of lung cancer diagnosis.    Subjective/ROS: Patient seen and examined at bedside. Patient seems hopeful about prognosis and feels ready for discharge to rehabilitation. Eager to get out of bed more frequently.    Denies Fever/Chills, HA, CP, SOB, n/v, changes in bowel/urinary habits.  12pt ROS otherwise negative.    VITALS  Vital Signs Last 24 Hrs  T(C): 37.3 (20 Jul 2023 12:00), Max: 37.3 (20 Jul 2023 12:00)  T(F): 99.2 (20 Jul 2023 12:00), Max: 99.2 (20 Jul 2023 12:00)  HR: 79 (20 Jul 2023 13:04) (57 - 79)  BP: 134/79 (20 Jul 2023 13:04) (105/63 - 158/81)  BP(mean): --  RR: 18 (20 Jul 2023 12:00) (17 - 18)  SpO2: 96% (20 Jul 2023 12:00) (96% - 99%)    Parameters below as of 20 Jul 2023 12:00  Patient On (Oxygen Delivery Method): room air        CAPILLARY BLOOD GLUCOSE      POCT Blood Glucose.: 269 mg/dL (20 Jul 2023 12:56)  POCT Blood Glucose.: 86 mg/dL (20 Jul 2023 09:33)  POCT Blood Glucose.: 416 mg/dL (19 Jul 2023 22:08)  POCT Blood Glucose.: 87 mg/dL (19 Jul 2023 17:55)      PHYSICAL EXAM  General: NAD  Head: NC/AT; PERRL; EOMI;  Neck: Supple; no JVD  Respiratory: CTAB; no wheezes  Cardiovascular: Regular rhythm/rate; S1/S2+, no murmurs  Gastrointestinal: Soft; NTND; bowel sounds normal and present  Extremities: WWP; no edema  Neurological: A&Ox3, CNII-XII grossly intact; no obvious focal deficits    MEDICATIONS  (STANDING):  amLODIPine   Tablet 10 milliGRAM(s) Oral every 24 hours  cyanocobalamin Injectable 1000 MICROGram(s) IntraMuscular once  dexAMETHasone     Tablet 4 milliGRAM(s) Oral two times a day  dextrose 5%. 1000 milliLiter(s) (100 mL/Hr) IV Continuous <Continuous>  dextrose 50% Injectable 25 Gram(s) IV Push once  dextrose 50% Injectable 12.5 Gram(s) IV Push once  folic acid 1 milliGRAM(s) Oral every 24 hours  glucagon  Injectable 1 milliGRAM(s) IntraMuscular once  hydroxychloroquine 200 milliGRAM(s) Oral every 12 hours  ibuprofen  Tablet. 600 milliGRAM(s) Oral once  insulin glargine Injectable (LANTUS) 20 Unit(s) SubCutaneous at bedtime  insulin lispro (ADMELOG) corrective regimen sliding scale   SubCutaneous Before meals and at bedtime  insulin lispro Injectable (ADMELOG) 12 Unit(s) SubCutaneous three times a day before meals  lidocaine   4% Patch 1 Patch Transdermal every 24 hours  losartan 100 milliGRAM(s) Oral every 24 hours  metoprolol succinate  milliGRAM(s) Oral every 24 hours  sulfaSALAzine 1000 milliGRAM(s) Oral every 12 hours    MEDICATIONS  (PRN):  acetaminophen     Tablet .. 650 milliGRAM(s) Oral every 6 hours PRN Temp greater or equal to 38.5C (101.3F), Mild Pain (1 - 3), Moderate Pain (4 - 6)  dextrose Oral Gel 15 Gram(s) Oral once PRN Blood Glucose LESS THAN 70 milliGRAM(s)/deciliter      No Known Allergies      LABS                        12.1   14.78 )-----------( 195      ( 20 Jul 2023 05:30 )             38.1     07-20    137  |  105  |  48<H>  ----------------------------<  146<H>  4.6   |  24  |  1.42<H>    Ca    7.6<L>      20 Jul 2023 05:30  Phos  2.9     07-20  Mg     2.1     07-20        Urinalysis Basic - ( 20 Jul 2023 05:30 )    Color: x / Appearance: x / SG: x / pH: x  Gluc: 146 mg/dL / Ketone: x  / Bili: x / Urobili: x   Blood: x / Protein: x / Nitrite: x   Leuk Esterase: x / RBC: x / WBC x   Sq Epi: x / Non Sq Epi: x / Bacteria: x              IMAGING/EKG/ETC

## 2023-07-20 NOTE — PROGRESS NOTE ADULT - TIME BILLING
Patient seen and examined;  Needs better glucose control prior to transfer to rehab;  Still has dose Decadron   Will see Oncology and Neurosurgery as outpatient

## 2023-07-21 LAB
ANION GAP SERPL CALC-SCNC: 8 MMOL/L — SIGNIFICANT CHANGE UP (ref 5–17)
BASOPHILS # BLD AUTO: 0.08 K/UL — SIGNIFICANT CHANGE UP (ref 0–0.2)
BASOPHILS NFR BLD AUTO: 0.6 % — SIGNIFICANT CHANGE UP (ref 0–2)
BUN SERPL-MCNC: 42 MG/DL — HIGH (ref 7–23)
CALCIUM SERPL-MCNC: 7.6 MG/DL — LOW (ref 8.4–10.5)
CHLORIDE SERPL-SCNC: 108 MMOL/L — SIGNIFICANT CHANGE UP (ref 96–108)
CO2 SERPL-SCNC: 23 MMOL/L — SIGNIFICANT CHANGE UP (ref 22–31)
CREAT SERPL-MCNC: 1.25 MG/DL — SIGNIFICANT CHANGE UP (ref 0.5–1.3)
EGFR: 45 ML/MIN/1.73M2 — LOW
EOSINOPHIL # BLD AUTO: 0.04 K/UL — SIGNIFICANT CHANGE UP (ref 0–0.5)
EOSINOPHIL NFR BLD AUTO: 0.3 % — SIGNIFICANT CHANGE UP (ref 0–6)
GLUCOSE BLDC GLUCOMTR-MCNC: 107 MG/DL — HIGH (ref 70–99)
GLUCOSE BLDC GLUCOMTR-MCNC: 235 MG/DL — HIGH (ref 70–99)
GLUCOSE BLDC GLUCOMTR-MCNC: 319 MG/DL — HIGH (ref 70–99)
GLUCOSE BLDC GLUCOMTR-MCNC: 66 MG/DL — LOW (ref 70–99)
GLUCOSE BLDC GLUCOMTR-MCNC: 99 MG/DL — SIGNIFICANT CHANGE UP (ref 70–99)
GLUCOSE SERPL-MCNC: 148 MG/DL — HIGH (ref 70–99)
HCT VFR BLD CALC: 36.8 % — SIGNIFICANT CHANGE UP (ref 34.5–45)
HGB BLD-MCNC: 11.5 G/DL — SIGNIFICANT CHANGE UP (ref 11.5–15.5)
IMM GRANULOCYTES NFR BLD AUTO: 2.7 % — HIGH (ref 0–0.9)
LYMPHOCYTES # BLD AUTO: 14.8 % — SIGNIFICANT CHANGE UP (ref 13–44)
LYMPHOCYTES # BLD AUTO: 2.12 K/UL — SIGNIFICANT CHANGE UP (ref 1–3.3)
MAGNESIUM SERPL-MCNC: 1.9 MG/DL — SIGNIFICANT CHANGE UP (ref 1.6–2.6)
MCHC RBC-ENTMCNC: 30.1 PG — SIGNIFICANT CHANGE UP (ref 27–34)
MCHC RBC-ENTMCNC: 31.3 GM/DL — LOW (ref 32–36)
MCV RBC AUTO: 96.3 FL — SIGNIFICANT CHANGE UP (ref 80–100)
MONOCYTES # BLD AUTO: 1.05 K/UL — HIGH (ref 0–0.9)
MONOCYTES NFR BLD AUTO: 7.3 % — SIGNIFICANT CHANGE UP (ref 2–14)
NEUTROPHILS # BLD AUTO: 10.64 K/UL — HIGH (ref 1.8–7.4)
NEUTROPHILS NFR BLD AUTO: 74.3 % — SIGNIFICANT CHANGE UP (ref 43–77)
NRBC # BLD: 0 /100 WBCS — SIGNIFICANT CHANGE UP (ref 0–0)
PHOSPHATE SERPL-MCNC: 2.5 MG/DL — SIGNIFICANT CHANGE UP (ref 2.5–4.5)
PLATELET # BLD AUTO: 188 K/UL — SIGNIFICANT CHANGE UP (ref 150–400)
POTASSIUM SERPL-MCNC: 4.6 MMOL/L — SIGNIFICANT CHANGE UP (ref 3.5–5.3)
POTASSIUM SERPL-SCNC: 4.6 MMOL/L — SIGNIFICANT CHANGE UP (ref 3.5–5.3)
RBC # BLD: 3.82 M/UL — SIGNIFICANT CHANGE UP (ref 3.8–5.2)
RBC # FLD: 13.2 % — SIGNIFICANT CHANGE UP (ref 10.3–14.5)
SARS-COV-2 RNA SPEC QL NAA+PROBE: SIGNIFICANT CHANGE UP
SODIUM SERPL-SCNC: 139 MMOL/L — SIGNIFICANT CHANGE UP (ref 135–145)
WBC # BLD: 14.32 K/UL — HIGH (ref 3.8–10.5)
WBC # FLD AUTO: 14.32 K/UL — HIGH (ref 3.8–10.5)

## 2023-07-21 PROCEDURE — 99222 1ST HOSP IP/OBS MODERATE 55: CPT | Mod: GC

## 2023-07-21 PROCEDURE — 99232 SBSQ HOSP IP/OBS MODERATE 35: CPT

## 2023-07-21 RX ORDER — INSULIN LISPRO 100/ML
VIAL (ML) SUBCUTANEOUS
Refills: 0 | Status: DISCONTINUED | OUTPATIENT
Start: 2023-07-21 | End: 2023-07-24

## 2023-07-21 RX ORDER — DEXTROSE 50 % IN WATER 50 %
25 SYRINGE (ML) INTRAVENOUS ONCE
Refills: 0 | Status: DISCONTINUED | OUTPATIENT
Start: 2023-07-21 | End: 2023-07-24

## 2023-07-21 RX ORDER — INSULIN LISPRO 100/ML
VIAL (ML) SUBCUTANEOUS
Refills: 0 | Status: DISCONTINUED | OUTPATIENT
Start: 2023-07-21 | End: 2023-07-21

## 2023-07-21 RX ORDER — DEXTROSE 50 % IN WATER 50 %
15 SYRINGE (ML) INTRAVENOUS ONCE
Refills: 0 | Status: DISCONTINUED | OUTPATIENT
Start: 2023-07-21 | End: 2023-07-24

## 2023-07-21 RX ORDER — DEXTROSE 50 % IN WATER 50 %
12.5 SYRINGE (ML) INTRAVENOUS ONCE
Refills: 0 | Status: DISCONTINUED | OUTPATIENT
Start: 2023-07-21 | End: 2023-07-24

## 2023-07-21 RX ORDER — INSULIN GLARGINE 100 [IU]/ML
16 INJECTION, SOLUTION SUBCUTANEOUS AT BEDTIME
Refills: 0 | Status: DISCONTINUED | OUTPATIENT
Start: 2023-07-21 | End: 2023-07-21

## 2023-07-21 RX ORDER — INSULIN LISPRO 100/ML
12 VIAL (ML) SUBCUTANEOUS
Refills: 0 | Status: DISCONTINUED | OUTPATIENT
Start: 2023-07-21 | End: 2023-07-22

## 2023-07-21 RX ADMIN — LOSARTAN POTASSIUM 100 MILLIGRAM(S): 100 TABLET, FILM COATED ORAL at 12:43

## 2023-07-21 RX ADMIN — LIDOCAINE 1 PATCH: 4 CREAM TOPICAL at 19:09

## 2023-07-21 RX ADMIN — Medication 4: at 13:20

## 2023-07-21 RX ADMIN — AMLODIPINE BESYLATE 10 MILLIGRAM(S): 2.5 TABLET ORAL at 06:20

## 2023-07-21 RX ADMIN — LIDOCAINE 1 PATCH: 4 CREAM TOPICAL at 06:29

## 2023-07-21 RX ADMIN — Medication 200 MILLIGRAM(S): at 18:07

## 2023-07-21 RX ADMIN — Medication 1000 MILLIGRAM(S): at 06:28

## 2023-07-21 RX ADMIN — Medication 14 UNIT(S): at 13:20

## 2023-07-21 RX ADMIN — Medication 1000 MILLIGRAM(S): at 17:58

## 2023-07-21 RX ADMIN — Medication 200 MILLIGRAM(S): at 06:21

## 2023-07-21 RX ADMIN — LIDOCAINE 1 PATCH: 4 CREAM TOPICAL at 07:23

## 2023-07-21 RX ADMIN — Medication 2 MILLIGRAM(S): at 18:07

## 2023-07-21 RX ADMIN — Medication 14 UNIT(S): at 08:58

## 2023-07-21 RX ADMIN — Medication 12 UNIT(S): at 18:22

## 2023-07-21 RX ADMIN — Medication 1 MILLIGRAM(S): at 18:07

## 2023-07-21 RX ADMIN — Medication 8: at 22:42

## 2023-07-21 RX ADMIN — Medication 100 MILLIGRAM(S): at 10:13

## 2023-07-21 RX ADMIN — Medication 62.5 MILLIMOLE(S): at 17:03

## 2023-07-21 RX ADMIN — Medication 2 MILLIGRAM(S): at 06:19

## 2023-07-21 NOTE — PROGRESS NOTE ADULT - PROBLEM SELECTOR PLAN 5
Insulin basal bolus and prandial adjusted  A1C results: 7.4  - f/u endocrine consult  - insulin glargine Injectable (LANTUS) 20 Unit(s) SubCutaneous at bedtime  - insulin lispro Injectable (ADMELOG) 12 Unit(s) SubCutaneous three times a day before meals  - insulin lispro (ADMELOG) corrective regimen sliding scale   SubCutaneous three times a day before meals  - endocrine consulted to gain better glycemic control prior to discharge Insulin basal bolus and prandial adjusted  A1C results: 7.4  - f/u endocrine consult  - hold lantus tonight (7/21)  - lispro 12 units

## 2023-07-21 NOTE — PROGRESS NOTE ADULT - TIME BILLING
Plans as outlined;  Please call endocrine and have them see patient regarding insulin regimen   Physical therapy  SULMA is planned when discharged

## 2023-07-21 NOTE — CONSULT NOTE ADULT - CONSULT REASON
PM&R consult
possible brain met
Brain lesion
Consideration of radiation therapy for brain metastasis
Hyperglycemia management

## 2023-07-21 NOTE — PROGRESS NOTE ADULT - SUBJECTIVE AND OBJECTIVE BOX
HPI/Hospital Course    Overnight events::    Subjective: Patient seen and examined at bedside. Patient reports getting out of bed to chair more. Is able to walk the length of the hallway with walker.    Denies Fever/Chills, HA, CP, SOB, n/v, changes in bowel/urinary habits.  12pt ROS otherwise negative.    VITALS  Vital Signs Last 24 Hrs  T(C): 37 (21 Jul 2023 12:00), Max: 37.1 (20 Jul 2023 20:31)  T(F): 98.6 (21 Jul 2023 12:00), Max: 98.7 (20 Jul 2023 20:31)  HR: 64 (21 Jul 2023 12:00) (63 - 80)  BP: 110/68 (21 Jul 2023 12:00) (110/68 - 138/85)  BP(mean): --  RR: 18 (21 Jul 2023 12:00) (18 - 18)  SpO2: 97% (21 Jul 2023 12:00) (96% - 98%)    Parameters below as of 21 Jul 2023 12:00  Patient On (Oxygen Delivery Method): room air        CAPILLARY BLOOD GLUCOSE      POCT Blood Glucose.: 235 mg/dL (21 Jul 2023 13:05)  POCT Blood Glucose.: 107 mg/dL (21 Jul 2023 08:52)  POCT Blood Glucose.: 142 mg/dL (20 Jul 2023 21:54)  POCT Blood Glucose.: 74 mg/dL (20 Jul 2023 17:46)      PHYSICAL EXAM  General: NAD  Head: NC/AT; MMM; PERRL; EOMI;  Neck: Supple; no JVD  Respiratory: CTAB; no wheezes/rales/rhonchi  Cardiovascular: Regular rhythm/rate; S1/S2+, no murmurs, rubs gallops   Gastrointestinal: Soft; NTND; bowel sounds normal and present  Extremities: WWP; no edema/cyanosis  Neurological: A&Ox3, CNII-XII grossly intact; no obvious focal deficits    MEDICATIONS  (STANDING):  amLODIPine   Tablet 10 milliGRAM(s) Oral every 24 hours  dexAMETHasone     Tablet 2 milliGRAM(s) Oral every 12 hours  dextrose 5%. 1000 milliLiter(s) (100 mL/Hr) IV Continuous <Continuous>  dextrose 50% Injectable 12.5 Gram(s) IV Push once  dextrose 50% Injectable 25 Gram(s) IV Push once  folic acid 1 milliGRAM(s) Oral every 24 hours  glucagon  Injectable 1 milliGRAM(s) IntraMuscular once  hydroxychloroquine 200 milliGRAM(s) Oral every 12 hours  ibuprofen  Tablet. 600 milliGRAM(s) Oral once  insulin glargine Injectable (LANTUS) 16 Unit(s) SubCutaneous at bedtime  insulin lispro (ADMELOG) corrective regimen sliding scale   SubCutaneous Before meals and at bedtime  insulin lispro Injectable (ADMELOG) 14 Unit(s) SubCutaneous three times a day before meals  lidocaine   4% Patch 1 Patch Transdermal every 24 hours  losartan 100 milliGRAM(s) Oral every 24 hours  metoprolol succinate  milliGRAM(s) Oral every 24 hours  sodium phosphate 15 milliMole(s)/250 mL IVPB 15 milliMole(s) IV Intermittent once  sulfaSALAzine 1000 milliGRAM(s) Oral every 12 hours    MEDICATIONS  (PRN):  acetaminophen     Tablet .. 650 milliGRAM(s) Oral every 6 hours PRN Temp greater or equal to 38.5C (101.3F), Mild Pain (1 - 3), Moderate Pain (4 - 6)  dextrose Oral Gel 15 Gram(s) Oral once PRN Blood Glucose LESS THAN 70 milliGRAM(s)/deciliter      No Known Allergies      LABS                        11.5   14.32 )-----------( 188      ( 21 Jul 2023 05:30 )             36.8     07-21    139  |  108  |  42<H>  ----------------------------<  148<H>  4.6   |  23  |  1.25    Ca    7.6<L>      21 Jul 2023 05:30  Phos  2.5     07-21  Mg     1.9     07-21        Urinalysis Basic - ( 21 Jul 2023 05:30 )    Color: x / Appearance: x / SG: x / pH: x  Gluc: 148 mg/dL / Ketone: x  / Bili: x / Urobili: x   Blood: x / Protein: x / Nitrite: x   Leuk Esterase: x / RBC: x / WBC x   Sq Epi: x / Non Sq Epi: x / Bacteria: x              IMAGING/EKG/ETC   HPI/Hospital Course: 73y Female with PMHx of HTN HLD, DM, R hip replacement, RA, CKD (Cr baseline ~2) presents to the  ED for unsteadiness and dizziness x1 week. LKN was a week ago. mRS of 2, walks with a rolling walker but is able to care for her basic everyday needs. NIH of 5 for L facial and dysarthria and LUE weakness. 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 Daughter at bedside states that she is at her baseline. LUE is pain limited to her rheumatoid arthritis but patient states that she definitely has noticed it to be slightly weaker than usual. Otherwise no numbness or tingling, no headache, N/V. CTH was completed in the ED concerning for age indeterminate L cerebellar lacunar infarcts. CTAP showed lesions in the lungs, liver, and pancreas. She is s/p bronchoscopy with biopsy with showed NSLC adenocarcinoma. Patient was given decadron 10 mg IV and was tapered down to 2 mg BID for discharge.    Overnight events: no acute events over nights    Subjective: Patient seen and examined at bedside. Patient reports getting out of bed to chair more. Is able to walk the length of the hallway with walker.    Denies Fever/Chills, HA, CP, SOB, n/v, changes in bowel/urinary habits.  12pt ROS otherwise negative.    VITALS  Vital Signs Last 24 Hrs  T(C): 37 (21 Jul 2023 12:00), Max: 37.1 (20 Jul 2023 20:31)  T(F): 98.6 (21 Jul 2023 12:00), Max: 98.7 (20 Jul 2023 20:31)  HR: 64 (21 Jul 2023 12:00) (63 - 80)  BP: 110/68 (21 Jul 2023 12:00) (110/68 - 138/85)  BP(mean): --  RR: 18 (21 Jul 2023 12:00) (18 - 18)  SpO2: 97% (21 Jul 2023 12:00) (96% - 98%)    Parameters below as of 21 Jul 2023 12:00   Patient On (Oxygen Delivery Method): room air        CAPILLARY BLOOD GLUCOSE      POCT Blood Glucose.: 235 mg/dL (21 Jul 2023 13:05)  POCT Blood Glucose.: 107 mg/dL (21 Jul 2023 08:52)  POCT Blood Glucose.: 142 mg/dL (20 Jul 2023 21:54)  POCT Blood Glucose.: 74 mg/dL (20 Jul 2023 17:46)      PHYSICAL EXAM  General: NAD  Head: NC/AT; MMM; PERRL; EOMI;  Neck: Supple; no JVD  Respiratory: CTAB; no wheezes/rales/rhonchi  Cardiovascular: Regular rhythm/rate; S1/S2+, no murmurs, rubs gallops   Gastrointestinal: Soft; NTND; bowel sounds normal and present  Extremities: WWP; no edema/cyanosis  Neurological: A&Ox3, CNII-XII grossly intact; no obvious focal deficits    MEDICATIONS  (STANDING):  amLODIPine   Tablet 10 milliGRAM(s) Oral every 24 hours  dexAMETHasone     Tablet 2 milliGRAM(s) Oral every 12 hours  dextrose 5%. 1000 milliLiter(s) (100 mL/Hr) IV Continuous <Continuous>  dextrose 50% Injectable 12.5 Gram(s) IV Push once  dextrose 50% Injectable 25 Gram(s) IV Push once  folic acid 1 milliGRAM(s) Oral every 24 hours  glucagon  Injectable 1 milliGRAM(s) IntraMuscular once  hydroxychloroquine 200 milliGRAM(s) Oral every 12 hours  ibuprofen  Tablet. 600 milliGRAM(s) Oral once  insulin glargine Injectable (LANTUS) 16 Unit(s) SubCutaneous at bedtime  insulin lispro (ADMELOG) corrective regimen sliding scale   SubCutaneous Before meals and at bedtime  insulin lispro Injectable (ADMELOG) 14 Unit(s) SubCutaneous three times a day before meals  lidocaine   4% Patch 1 Patch Transdermal every 24 hours  losartan 100 milliGRAM(s) Oral every 24 hours  metoprolol succinate  milliGRAM(s) Oral every 24 hours  sodium phosphate 15 milliMole(s)/250 mL IVPB 15 milliMole(s) IV Intermittent once  sulfaSALAzine 1000 milliGRAM(s) Oral every 12 hours    MEDICATIONS  (PRN):  acetaminophen     Tablet .. 650 milliGRAM(s) Oral every 6 hours PRN Temp greater or equal to 38.5C (101.3F), Mild Pain (1 - 3), Moderate Pain (4 - 6)  dextrose Oral Gel 15 Gram(s) Oral once PRN Blood Glucose LESS THAN 70 milliGRAM(s)/deciliter      No Known Allergies      LABS                        11.5   14.32 )-----------( 188      ( 21 Jul 2023 05:30 )             36.8     07-21    139  |  108  |  42<H>  ----------------------------<  148<H>  4.6   |  23  |  1.25    Ca    7.6<L>      21 Jul 2023 05:30  Phos  2.5     07-21  Mg     1.9     07-21        Urinalysis Basic - ( 21 Jul 2023 05:30 )    Color: x / Appearance: x / SG: x / pH: x  Gluc: 148 mg/dL / Ketone: x  / Bili: x / Urobili: x   Blood: x / Protein: x / Nitrite: x   Leuk Esterase: x / RBC: x / WBC x   Sq Epi: x / Non Sq Epi: x / Bacteria: x              IMAGING/EKG/ETC

## 2023-07-21 NOTE — PROGRESS NOTE ADULT - PROBLEM SELECTOR PLAN 2
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  These findings are best viewed on the cellblock. They are morphologically similar to those seen in the left lung FNA.  [id number], [id number] [id number]  - s/p bronchoscopy 7/14/23

## 2023-07-21 NOTE — PROVIDER CONTACT NOTE (HYPOGLYCEMIA EVENT) - NS PROVIDER CONTACT BACKGROUND-HYPO
Age: 73y    Gender: Female    POCT Blood Glucose:  99 mg/dL (07-21-23 @ 18:18)  66 mg/dL (07-21-23 @ 17:35)  235 mg/dL (07-21-23 @ 13:05)  107 mg/dL (07-21-23 @ 08:52)  142 mg/dL (07-20-23 @ 21:54)    Patient provided 2 cups of orange juice. Patient aox4. Patient awake and alert. Fingerstick rechecked to 99 mg/dl. Patient provided dinner. Pre-meals insulin of 12 units given. Patient ate dinner meal.     eMAR:  dexAMETHasone     Tablet   2 milliGRAM(s) Oral (07-21-23 @ 18:07)   2 milliGRAM(s) Oral (07-21-23 @ 06:19)    insulin lispro (ADMELOG) corrective regimen sliding scale   4 Unit(s) SubCutaneous (07-21-23 @ 13:20)    insulin lispro Injectable (ADMELOG)   14 Unit(s) SubCutaneous (07-21-23 @ 13:20)   14 Unit(s) SubCutaneous (07-21-23 @ 08:58)    insulin lispro Injectable (ADMELOG)   12 Unit(s) SubCutaneous (07-21-23 @ 18:22)

## 2023-07-21 NOTE — PROGRESS NOTE ADULT - SUBJECTIVE AND OBJECTIVE BOX
INTERVAL HPI/OVERNIGHT EVENTS:  No complaint  Glucoses appeared better controlled with decrease in Decadron dose       MEDICATIONS  (STANDING):  amLODIPine   Tablet 10 milliGRAM(s) Oral every 24 hours  dexAMETHasone     Tablet 2 milliGRAM(s) Oral every 12 hours  dextrose 5%. 1000 milliLiter(s) (100 mL/Hr) IV Continuous <Continuous>  dextrose 50% Injectable 25 Gram(s) IV Push once  dextrose 50% Injectable 12.5 Gram(s) IV Push once  folic acid 1 milliGRAM(s) Oral every 24 hours  glucagon  Injectable 1 milliGRAM(s) IntraMuscular once  hydroxychloroquine 200 milliGRAM(s) Oral every 12 hours  ibuprofen  Tablet. 600 milliGRAM(s) Oral once  insulin lispro (ADMELOG) corrective regimen sliding scale   SubCutaneous Before meals and at bedtime  insulin lispro Injectable (ADMELOG) 14 Unit(s) SubCutaneous three times a day before meals  lidocaine   4% Patch 1 Patch Transdermal every 24 hours  losartan 100 milliGRAM(s) Oral every 24 hours  metoprolol succinate  milliGRAM(s) Oral every 24 hours  sulfaSALAzine 1000 milliGRAM(s) Oral every 12 hours    MEDICATIONS  (PRN):  acetaminophen     Tablet .. 650 milliGRAM(s) Oral every 6 hours PRN Temp greater or equal to 38.5C (101.3F), Mild Pain (1 - 3), Moderate Pain (4 - 6)  dextrose Oral Gel 15 Gram(s) Oral once PRN Blood Glucose LESS THAN 70 milliGRAM(s)/deciliter      Allergies    No Known Allergies    Intolerances        Vital Signs Last 24 Hrs  T(C): 36.5 (21 Jul 2023 06:22), Max: 37.3 (20 Jul 2023 12:00)  T(F): 97.7 (21 Jul 2023 06:22), Max: 99.2 (20 Jul 2023 12:00)  HR: 63 (21 Jul 2023 06:22) (63 - 79)  BP: 138/85 (21 Jul 2023 06:22) (112/65 - 158/81)  BP(mean): --  RR: 18 (21 Jul 2023 06:22) (18 - 18)  SpO2: 98% (21 Jul 2023 06:22) (96% - 98%)    Parameters below as of 21 Jul 2023 06:22  Patient On (Oxygen Delivery Method): room air              Constitutional: Awake and alert    Eyes: NEDRA    ENMT: Negative    Neck: Supple    Back:  no tenderness     Respiratory:  clear    Cardiovascular: S1 S2    Gastrointestinal:  soft     Genitourinary:    Extremities:  no edema     Vascular:    Neurological:    Skin:    Lymph Nodes:            LABS:                        11.5   14.32 )-----------( 188      ( 21 Jul 2023 05:30 )             36.8     07-21    139  |  108  |  42<H>  ----------------------------<  148<H>  4.6   |  23  |  1.25    Ca    7.6<L>      21 Jul 2023 05:30  Phos  2.5     07-21  Mg     1.9     07-21        Urinalysis Basic - ( 21 Jul 2023 05:30 )    Color: x / Appearance: x / SG: x / pH: x  Gluc: 148 mg/dL / Ketone: x  / Bili: x / Urobili: x   Blood: x / Protein: x / Nitrite: x   Leuk Esterase: x / RBC: x / WBC x   Sq Epi: x / Non Sq Epi: x / Bacteria: x        RADIOLOGY & ADDITIONAL TESTS:

## 2023-07-21 NOTE — CONSULT NOTE ADULT - PROBLEM SELECTOR RECOMMENDATION 9
- Please continue lantus *** units at bedtime.   - Continue lispro *** units before each meal.  - Continue lispro moderate / low dose sliding scale before meals and at bedtime.  - Patient's fingerstick glucose goal is 100-180 mg/dL.    - Discharge recommendations to be discussed.   - Patient can follow up at discharge with [facility] by calling [phone] to make an appointment.      Case discussed with Dr. Blackwood. Primary team updated. Patient states at home she tends to wake up with fasting FSG below 100. This AM although Lantus was held, FSG was 86 in AM.     - Please hold lantus at bedtime.   - Continue lispro 12 units before each meal.  - Continue lispro low dose sliding scale before meals and at bedtime.  - Patient's fingerstick glucose goal is 100-180 mg/dL.    - Discharge recommendations to be discussed.   - Patient can follow up at discharge with [facility] by calling [phone] to make an appointment.      Case discussed with Dr. Blackwood. Primary team updated.

## 2023-07-21 NOTE — CONSULT NOTE ADULT - SUBJECTIVE AND OBJECTIVE BOX
HISTORY OF PRESENT ILLNESS  Ms. Minor is a 73 year old female with a PMHx of HTN, HLD, DM, Right hip replacement, RA, CKD (Cr baseline ~2) who presented to the  ED for unsteadiness and dizziness x1 week- more so than usual. Last known normal was a week PTA. At baseline, she has L. sided bell's palsy with dysarthria and LUE weakness. Initial stroke imaging (see below) were negative for acute hemorrhage however showed vasogenic edema that further imaging showed to be an 6mm nodule of the posterior superior frontal lobe concerning for metastases. CTAP showed lesions in the lung, liver, and pancreas, including a spiculated  EM lung mass; lung biopsy subsequently showed NSCL, pending next generation sequencing.   For the MRI findings, per neurosurgery, patient was started on 10mg decadron x 1 and then a 1 week taper: 4mg q6hrs(-7/15) ->4mg BID ->2mg BID, and was started on Lantus 8 Lispro 2 TID initially however due to high sugars (requiring 48 u SS over 24 hours) this was increased to 20 u lantus and 14u lispro (7/18-7/20) -> Lantus 16, Lispro 14 as the decadron was tapered.     Imaging:  CT head showed age indeterminate L. cerebellar lacunar infarcts and R. frontoparietal vasogenic edema with sulcal effacement; CTA was deferred due to CKD.  MR brain noncon showed a 8mm nodule of the posterior-superior frontal lobe with vasogenic edema->contrast showed unremarkable. Concerned for malignancy.      CAPILLARY BLOOD GLUCOSE & INSULIN RECEIVED  86 mg/dL (07-20 @ 09:33) 12+0 U SS  269 mg/dL (07-20 @ 12:56) -> 12 +6 U SS  74 mg/dL (07-20 @ 17:46) ->14 + 0  142 mg/dL (07-20 @ 21:54)  ------  107 mg/dL (07-21 @ 08:52) _14+ 0       DIABETES HISTORY  - Age at diagnosis:   - Symptoms at time of diagnosis:   - Current Therapy:  - History of other regimens:   - History of hypoglycemia:   - History of DKA/HHS:   - Complications:   - Home FSG:        > Fasting: *** mg/dL.        > Before meals: *** mg/dL.        > Bedtime: *** mg/dL.  - Diet:          > Breakfast:         > Lunch:        > Dinner:        > Snacks:  - Physical activity:    - Outpatient follow-up:     PAST MEDICAL & SURGICAL HISTORY  As per history of present illness.     FAMILY HISTORY  - Diabetes:  - Thyroid:  - Autoimmune:  - Other:    SOCIAL HISTORY  - Work:  - Alcohol:  - Smoking:  - Recreational Drugs:    ALLERGIES  No Known Allergies    CURRENT MEDICATIONS  acetaminophen     Tablet .. 650 milliGRAM(s) Oral every 6 hours PRN  amLODIPine   Tablet 10 milliGRAM(s) Oral every 24 hours  dexAMETHasone     Tablet 2 milliGRAM(s) Oral every 12 hours  dextrose 5%. 1000 milliLiter(s) IV Continuous <Continuous>  dextrose 50% Injectable 12.5 Gram(s) IV Push once  dextrose 50% Injectable 25 Gram(s) IV Push once  dextrose Oral Gel 15 Gram(s) Oral once PRN  folic acid 1 milliGRAM(s) Oral every 24 hours  glucagon  Injectable 1 milliGRAM(s) IntraMuscular once  hydroxychloroquine 200 milliGRAM(s) Oral every 12 hours  ibuprofen  Tablet. 600 milliGRAM(s) Oral once  insulin lispro (ADMELOG) corrective regimen sliding scale   SubCutaneous Before meals and at bedtime  insulin lispro Injectable (ADMELOG) 14 Unit(s) SubCutaneous three times a day before meals  lidocaine   4% Patch 1 Patch Transdermal every 24 hours  losartan 100 milliGRAM(s) Oral every 24 hours  metoprolol succinate  milliGRAM(s) Oral every 24 hours  sulfaSALAzine 1000 milliGRAM(s) Oral every 12 hours    REVIEW OF SYSTEMS  Constitutional:  Negative fever, chills or loss of appetite.  Eyes:  Negative blurry vision or double vision.  Cardiovascular:  Negative for chest pain or palpitations.  Respiratory:  Negative for cough, wheezing, or shortness of breath.   Gastrointestinal:  Negative for nausea, vomiting, diarrhea, constipation, or abdominal pain.  Genitourinary:  Negative frequency, urgency or dysuria.  Neurologic:  No headache, confusion, dizziness, lightheadedness.    PHYSICAL EXAM  Vital Signs Last 24 Hrs  T(C): 36.5 (21 Jul 2023 06:22), Max: 37.3 (20 Jul 2023 12:00)  T(F): 97.7 (21 Jul 2023 06:22), Max: 99.2 (20 Jul 2023 12:00)  HR: 63 (21 Jul 2023 06:22) (63 - 79)  BP: 138/85 (21 Jul 2023 06:22) (112/65 - 158/81)  BP(mean): --  RR: 18 (21 Jul 2023 06:22) (18 - 18)  SpO2: 98% (21 Jul 2023 06:22) (96% - 98%)    Parameters below as of 21 Jul 2023 06:22  Patient On (Oxygen Delivery Method): room air    Constitutional: Awake, alert, in no acute distress.   HEENT: Normocephalic, atraumatic, NEDRA, no proptosis or lid retraction.   Neck: supple, no acanthosis, no thyromegaly or palpable thyroid nodules.  Respiratory: Lungs clear to ausculation bilaterally.   Cardiovascular: regular rhythm, normal S1 and S2, no audible murmurs.   GI: soft, non-tender, non-distended, bowel sounds present, no masses appreciated.  Extremities: No lower extremity edema, peripheral pulses present.   Skin: no rashes.   Psychiatric: AAO x 3. Normal affect/mood.     LABS  CBC - WBC/HGB/HTC/PLT: 14.32/11.5/36.8/188 (07-21-23)  BMP: Na/K/Cl/Bicarb/BUN/Cr/Gluc: 139/4.6/108/23/42/1.25/148 (07-21-23)  Anion Gap: 8 (07-21-23)  eGFR: 45 (07-21-23)  Calcium: 7.6 (07-21-23)  Phosphorus: 2.5 (07-21-23)  Magnesium: 1.9 (07-21-23)      Thyroid Stimulating Hormone, Serum: 0.824 (07-10-23)      ASSESSMENT / RECOMMENDATIONS    A1C: 7.4 %  BUN: 42  Creatinine: 1.25  GFR: 45  Weight: 68.8  BMI: 29.3  EF: 65-70% (7/9/23)       HISTORY OF PRESENT ILLNESS  Ms. Minor is a 73 year old female with a PMHx of HTN, HLD, DM, Right hip replacement, RA, CKD (Cr baseline ~2) who presented to the  ED for unsteadiness and dizziness x1 week- more so than usual. Last known normal was a week PTA. At baseline, she has L. sided bell's palsy with dysarthria and LUE weakness. Initial stroke imaging (see below) were negative for acute hemorrhage however showed vasogenic edema that further imaging showed to be an 6mm nodule of the posterior superior frontal lobe concerning for metastases. CTAP showed lesions in the lung, liver, and pancreas, including a spiculated  EM lung mass; lung biopsy subsequently showed NSCL, pending next generation sequencing.   For the MRI findings, per neurosurgery, patient was started on 10mg decadron x 1 and then a 1 week taper: 4mg q6hrs(-7/15) ->4mg BID ->2mg BID, and was started on Lantus 8 Lispro 2 TID initially however due to high sugars (requiring 48 u SS over 24 hours) this was increased to 20 u lantus and 14u lispro (-) -> Lantus 16, Lispro 14 as the decadron was tapered.     Imaging:  CT head showed age indeterminate L. cerebellar lacunar infarcts and R. frontoparietal vasogenic edema with sulcal effacement; CTA was deferred due to CKD.  MR brain noncon showed a 8mm nodule of the posterior-superior frontal lobe with vasogenic edema->contrast showed unremarkable. Concerned for malignancy.      CAPILLARY BLOOD GLUCOSE & INSULIN RECEIVED  86 mg/dL ( @ 09:33) 12+0 U SS 2 pancakes with syrup  269 mg/dL ( @ 12:56) -> 12 +6 U SS Cod, Salad  74 mg/dL ( @ 17:46) ->14 + 0 Cod, Salad  142 mg/dL ( @ 21:54)  ------  - Pound cake  107 mg/dL ( @ 08:52) _14+ 0 2 pancakes with syrup      DIABETES HISTORY  - Age at diagnosis: 14 year PTA  - Symptoms at time of diagnosis: Sugars to 500, passed out, went to hospital   - Current Therapy: Farxiga, Januvia  - History of other regimens: SS Novalog  - History of hypoglycemia: Denies  - History of DKA/HHS:  Denies  - Complications:   - Home FSG:        > Fastin,80 mg/dL.        > Before meals: <200 mg/dL.        > Bedtime: *** mg/dL.  - Diet:          > Breakfast: Toast 2 slices, tumeric tea        > Lunch: Crackers and babybel cheese        > Dinner: Potato OR rice, chicken, salad        > Snacks: yogurt at 9PM sometimes  - Physical activity:  [employer] videos  - Outpatient follow-up:      PAST MEDICAL & SURGICAL HISTORY  As per history of present illness.     FAMILY HISTORY  - Diabetes: Maternal side  - Thyroid: --  - Autoimmune: ---  - Other: ---    SOCIAL HISTORY  - Work: Retired, nurse  - Alcohol: Denies  - Smokin pack years  - Recreational Drugs: a loong time ago    ALLERGIES  No Known Allergies    CURRENT MEDICATIONS  acetaminophen     Tablet .. 650 milliGRAM(s) Oral every 6 hours PRN  amLODIPine   Tablet 10 milliGRAM(s) Oral every 24 hours  dexAMETHasone     Tablet 2 milliGRAM(s) Oral every 12 hours  dextrose 5%. 1000 milliLiter(s) IV Continuous <Continuous>  dextrose 50% Injectable 12.5 Gram(s) IV Push once  dextrose 50% Injectable 25 Gram(s) IV Push once  dextrose Oral Gel 15 Gram(s) Oral once PRN  folic acid 1 milliGRAM(s) Oral every 24 hours  glucagon  Injectable 1 milliGRAM(s) IntraMuscular once  hydroxychloroquine 200 milliGRAM(s) Oral every 12 hours  ibuprofen  Tablet. 600 milliGRAM(s) Oral once  insulin lispro (ADMELOG) corrective regimen sliding scale   SubCutaneous Before meals and at bedtime  insulin lispro Injectable (ADMELOG) 14 Unit(s) SubCutaneous three times a day before meals  lidocaine   4% Patch 1 Patch Transdermal every 24 hours  losartan 100 milliGRAM(s) Oral every 24 hours  metoprolol succinate  milliGRAM(s) Oral every 24 hours  sulfaSALAzine 1000 milliGRAM(s) Oral every 12 hours    REVIEW OF SYSTEMS  Constitutional:  Negative fever, chills or loss of appetite.  Eyes:  Negative blurry vision or double vision.  Cardiovascular:  Negative for chest pain or palpitations.  Respiratory:  Negative for cough, wheezing, or shortness of breath.   Gastrointestinal:  Negative for nausea, vomiting, diarrhea, constipation, or abdominal pain.  Genitourinary:  Negative frequency, urgency or dysuria.  Neurologic:  No headache, confusion, dizziness, lightheadedness.    PHYSICAL EXAM  Vital Signs Last 24 Hrs  T(C): 36.5 (2023 06:22), Max: 37.3 (2023 12:00)  T(F): 97.7 (2023 06:22), Max: 99.2 (2023 12:00)  HR: 63 (2023 06:22) (63 - 79)  BP: 138/85 (2023 06:22) (112/65 - 158/81)  BP(mean): --  RR: 18 (2023 06:22) (18 - 18)  SpO2: 98% (:22) (96% - 98%)    Parameters below as of 2023 06:22  Patient On (Oxygen Delivery Method): room air    Constitutional: Awake, alert, in no acute distress.   HEENT: Normocephalic, atraumatic, NEDRA, no proptosis or lid retraction. L . sided lips downward turned. Dysarthric.    Neck: supple, no acanthosis, no thyromegaly or palpable thyroid nodules.  Respiratory: Lungs clear to ausculation bilaterally.   Cardiovascular: regular rhythm, normal S1 and S2, no audible murmurs.   GI: soft, non-tender, non-distended, bowel sounds present, no masses appreciated.  Extremities: No lower extremity edema, peripheral pulses present.   Skin: no rashes.   Psychiatric: AAO x 3. Normal affect/mood.     LABS  CBC - WBC/HGB/HTC/PLT: 14.32/11.5/36.8/188 (23)  BMP: Na/K/Cl/Bicarb/BUN/Cr/Gluc: 139/4.6/108/23/42/1.25/148 (23)  Anion Gap: 8 (23)  eGFR: 45 (23)  Calcium: 7.6 (23)  Phosphorus: 2.5 (23)  Magnesium: 1.9 (23)      Thyroid Stimulating Hormone, Serum: 0.824 (07-10-23)      ASSESSMENT / RECOMMENDATIONS    A1C: 7.4 %  BUN: 42  Creatinine: 1.25  GFR: 45  Weight: 68.8  BMI: 29.3  EF: 65-70% (23)

## 2023-07-21 NOTE — CONSULT NOTE ADULT - ATTENDING COMMENTS
I evaluated the patient with the Oncology fellow, Dr Hernandez.   Pt admitted to the stroke service.  Imaging shows a chronic lacunar infarct as well as an 8mm enhancing cortical based lesion suspicious for metastasis.  The patient has no hx of cancer and no new findings by history or exam to point to a primary tumor.  She reports keeping up to date w/ screening mammograms.  Please obtain CT c/a/p with contrast to look for a primary site of malignancy.  If the CT's are negative for cancer, then will defer to NSGY whether to biopsy the brain lesion for a tissue diagnosis or follow on serial imaging.
Pt seen on rounds this afternoon.  73-yo woman with HTN, HLP, type 2 DM, RA and CKD who presented on 7/9 with an acute worsening of her baseline gait instability,  CT head showed a 6 mm mass in the R posterior frontal lobe with associated vasogenic edema suspicious for a metastatic lesion.  Subsequent imaging showed additional lesions in the lung (spiculated lesion in the EM suspected to be the primary), liver and pancreas.  Biopsy of the lung lesion showed NSSCA.  She was started on Decadron for the R frontal lobe edema--initially 4 mg q6h, now tapered to 2 mg q12h, which will be continued.    DM is managed as outpatient with a combination of Farxiga and Januvia, though she will often take small doses of Novolog as correction doses for hyperglycemia--doses are mostly on an "ad montrell" basis rather than on a consistent coverage schedule.  She reports morning fingersticks which are generally down to the  range, readings later in the day "below 200."   Her A1c level is only slightly above goal at 7.5%.  Was started on basal/bolus insulin to treat the exacerbation of hyperglycemia from the Decadron, and is now on Lantus 16 units/premeal lispro 12-14 units, though Lantus was held last night and glucose was 107 this morning.    --Given the excellent AM fingerstick today with Lantus having been held last night, continue off Lantus.  (This pattern would be similar to the one she shows at home, though the reason for the tendency for her glucoses to drop overnight is unclear).   --Keep the premeal lispro at 12 units for now--though this may need to be decreased somewhat even if the steroids are continued.

## 2023-07-21 NOTE — CONSULT NOTE ADULT - ASSESSMENT
Ms. Minor is a 73 year old female with a PMHx of HTN, HLD, DM, Right hip replacement, RA, CKD (Cr baseline ~2) who presented to the  ED for unsteadiness and dizziness x1 week, ccb stroke workup showing an incidental brain nodule which led to findings of stage IV NSCLC. Endocrinology was consulted for hyperglycemia mangement iso diabetes and decadron taper.          Ms. Minor is a 73 year old female with a PMHx of HTN, HLD, DM, Right hip replacement, RA, CKD (Cr baseline ~2) who presented to the  ED for unsteadiness and dizziness x1 week, ccb stroke workup showing an incidental brain nodule which led to findings of stage IV NSCLC. Endocrinology was consulted for hyperglycemia mangement iso diabetes and decadron taper - discharge recommendations.

## 2023-07-22 LAB
ANION GAP SERPL CALC-SCNC: 12 MMOL/L — SIGNIFICANT CHANGE UP (ref 5–17)
BASOPHILS # BLD AUTO: 0.07 K/UL — SIGNIFICANT CHANGE UP (ref 0–0.2)
BASOPHILS NFR BLD AUTO: 0.6 % — SIGNIFICANT CHANGE UP (ref 0–2)
BUN SERPL-MCNC: 46 MG/DL — HIGH (ref 7–23)
CALCIUM SERPL-MCNC: 7.6 MG/DL — LOW (ref 8.4–10.5)
CHLORIDE SERPL-SCNC: 106 MMOL/L — SIGNIFICANT CHANGE UP (ref 96–108)
CO2 SERPL-SCNC: 18 MMOL/L — LOW (ref 22–31)
CREAT SERPL-MCNC: 1.28 MG/DL — SIGNIFICANT CHANGE UP (ref 0.5–1.3)
EGFR: 44 ML/MIN/1.73M2 — LOW
EOSINOPHIL # BLD AUTO: 0.1 K/UL — SIGNIFICANT CHANGE UP (ref 0–0.5)
EOSINOPHIL NFR BLD AUTO: 0.8 % — SIGNIFICANT CHANGE UP (ref 0–6)
GLUCOSE BLDC GLUCOMTR-MCNC: 100 MG/DL — HIGH (ref 70–99)
GLUCOSE BLDC GLUCOMTR-MCNC: 100 MG/DL — HIGH (ref 70–99)
GLUCOSE BLDC GLUCOMTR-MCNC: 167 MG/DL — HIGH (ref 70–99)
GLUCOSE BLDC GLUCOMTR-MCNC: 187 MG/DL — HIGH (ref 70–99)
GLUCOSE SERPL-MCNC: 93 MG/DL — SIGNIFICANT CHANGE UP (ref 70–99)
HCT VFR BLD CALC: 38.1 % — SIGNIFICANT CHANGE UP (ref 34.5–45)
HGB BLD-MCNC: 11.7 G/DL — SIGNIFICANT CHANGE UP (ref 11.5–15.5)
IMM GRANULOCYTES NFR BLD AUTO: 2.7 % — HIGH (ref 0–0.9)
LYMPHOCYTES # BLD AUTO: 1.99 K/UL — SIGNIFICANT CHANGE UP (ref 1–3.3)
LYMPHOCYTES # BLD AUTO: 16 % — SIGNIFICANT CHANGE UP (ref 13–44)
MAGNESIUM SERPL-MCNC: 1.9 MG/DL — SIGNIFICANT CHANGE UP (ref 1.6–2.6)
MCHC RBC-ENTMCNC: 29.8 PG — SIGNIFICANT CHANGE UP (ref 27–34)
MCHC RBC-ENTMCNC: 30.7 GM/DL — LOW (ref 32–36)
MCV RBC AUTO: 96.9 FL — SIGNIFICANT CHANGE UP (ref 80–100)
MONOCYTES # BLD AUTO: 0.88 K/UL — SIGNIFICANT CHANGE UP (ref 0–0.9)
MONOCYTES NFR BLD AUTO: 7.1 % — SIGNIFICANT CHANGE UP (ref 2–14)
NEUTROPHILS # BLD AUTO: 9.05 K/UL — HIGH (ref 1.8–7.4)
NEUTROPHILS NFR BLD AUTO: 72.8 % — SIGNIFICANT CHANGE UP (ref 43–77)
NRBC # BLD: 0 /100 WBCS — SIGNIFICANT CHANGE UP (ref 0–0)
PHOSPHATE SERPL-MCNC: 3.4 MG/DL — SIGNIFICANT CHANGE UP (ref 2.5–4.5)
PLATELET # BLD AUTO: 170 K/UL — SIGNIFICANT CHANGE UP (ref 150–400)
POTASSIUM SERPL-MCNC: 4.7 MMOL/L — SIGNIFICANT CHANGE UP (ref 3.5–5.3)
POTASSIUM SERPL-SCNC: 4.7 MMOL/L — SIGNIFICANT CHANGE UP (ref 3.5–5.3)
RBC # BLD: 3.93 M/UL — SIGNIFICANT CHANGE UP (ref 3.8–5.2)
RBC # FLD: 13.5 % — SIGNIFICANT CHANGE UP (ref 10.3–14.5)
SODIUM SERPL-SCNC: 136 MMOL/L — SIGNIFICANT CHANGE UP (ref 135–145)
WBC # BLD: 12.43 K/UL — HIGH (ref 3.8–10.5)
WBC # FLD AUTO: 12.43 K/UL — HIGH (ref 3.8–10.5)

## 2023-07-22 PROCEDURE — 99232 SBSQ HOSP IP/OBS MODERATE 35: CPT

## 2023-07-22 RX ORDER — INSULIN LISPRO 100/ML
10 VIAL (ML) SUBCUTANEOUS
Refills: 0 | Status: DISCONTINUED | OUTPATIENT
Start: 2023-07-22 | End: 2023-07-24

## 2023-07-22 RX ADMIN — Medication 200 MILLIGRAM(S): at 18:38

## 2023-07-22 RX ADMIN — Medication 10 UNIT(S): at 18:38

## 2023-07-22 RX ADMIN — Medication 2 MILLIGRAM(S): at 06:36

## 2023-07-22 RX ADMIN — Medication 1000 MILLIGRAM(S): at 17:30

## 2023-07-22 RX ADMIN — LIDOCAINE 1 PATCH: 4 CREAM TOPICAL at 18:00

## 2023-07-22 RX ADMIN — Medication 200 MILLIGRAM(S): at 06:34

## 2023-07-22 RX ADMIN — Medication 12 UNIT(S): at 14:17

## 2023-07-22 RX ADMIN — Medication 100 MILLIGRAM(S): at 09:45

## 2023-07-22 RX ADMIN — Medication 12 UNIT(S): at 09:41

## 2023-07-22 RX ADMIN — Medication 2: at 14:18

## 2023-07-22 RX ADMIN — LIDOCAINE 1 PATCH: 4 CREAM TOPICAL at 06:36

## 2023-07-22 RX ADMIN — Medication 2 MILLIGRAM(S): at 18:38

## 2023-07-22 RX ADMIN — AMLODIPINE BESYLATE 10 MILLIGRAM(S): 2.5 TABLET ORAL at 06:36

## 2023-07-22 RX ADMIN — Medication 1000 MILLIGRAM(S): at 06:35

## 2023-07-22 RX ADMIN — Medication 1 MILLIGRAM(S): at 17:30

## 2023-07-22 NOTE — PROGRESS NOTE ADULT - PROBLEM SELECTOR PLAN 2
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  These findings are best viewed on the cellblock. They are morphologically similar to those seen in the left lung FNA.  [id number], [id number]  - s/p bronchoscopy 7/14/23

## 2023-07-22 NOTE — PROGRESS NOTE ADULT - PROBLEM SELECTOR PLAN 5
Insulin basal bolus and prandial adjusted  A1C results: 7.4  - f/u endocrine consult  - hold lantus tonight (7/21)  - lispro 12 units

## 2023-07-22 NOTE — PROGRESS NOTE ADULT - SUBJECTIVE AND OBJECTIVE BOX
Interval Events: Reviewed  Patient seen and examined at bedside.    Patient is a 73y old  Female who presents with a chief complaint of Stroke (21 Jul 2023 16:15)  no acute event overnight, RA 96%      PAST MEDICAL & SURGICAL HISTORY:  HTN (hypertension)      HLD (hyperlipidemia)      DM (diabetes mellitus), type 2      Rheumatoid arthritis      Stage 4 chronic kidney disease      Degenerative joint disease (DJD) of lumbar spine      Osteopenia      S/P total right hip arthroplasty          MEDICATIONS:  Pulmonary:    Antimicrobials:  hydroxychloroquine 200 milliGRAM(s) Oral every 12 hours    Anticoagulants:    Cardiac:  amLODIPine   Tablet 10 milliGRAM(s) Oral every 24 hours  losartan 100 milliGRAM(s) Oral every 24 hours  metoprolol succinate  milliGRAM(s) Oral every 24 hours      Allergies    No Known Allergies    Intolerances        Vital Signs Last 24 Hrs  T(C): 36.4 (22 Jul 2023 06:03), Max: 37 (21 Jul 2023 12:00)  T(F): 97.6 (22 Jul 2023 06:03), Max: 98.6 (21 Jul 2023 12:00)  HR: 66 (22 Jul 2023 06:03) (61 - 80)  BP: 134/77 (22 Jul 2023 06:03) (110/68 - 185/76)  BP(mean): --  RR: 18 (22 Jul 2023 06:03) (18 - 18)  SpO2: 95% (22 Jul 2023 06:03) (95% - 97%)    Parameters below as of 22 Jul 2023 06:03  Patient On (Oxygen Delivery Method): room air            Review of Systems:   •	General: negative  •	Skin/Breast: negative  •	Ophthalmologic: negative  •	ENMT: negative  •	Respiratory and Thorax: negative  •	Cardiovascular: negative  •	Gastrointestinal: negative  •	Genitourinary: negative  •	Musculoskeletal: negative  •	Neurological: negative  •	Psychiatric: negative  •	Hematology/Lymphatics: negative  •	Endocrine: negative  •	Allergic/Immunologic: negative    Physical Exam:   • Constitutional:	elderly female , NAD  • Eyes:	EOMI; PERRL; no drainage or redness  • ENMT:	No oral lesions; no gross abnormalities  • Neck	no thyromegaly or nodules  • Breasts:	not examined  • Back:	No deformity or limitation of movement  • Respiratory:	Breath Sounds equal & clear to auscultation, no accessory muscle use  • Cardiovascular:	Regular rate & rhythm, normal S1, S2; no murmurs, gallops or rubs; no S3, S4  • Gastrointestinal:	Soft, non-tender, no hepatosplenomegaly, normal bowel sounds  • Genitourinary:	not examined  • Rectal: not examined  • Extremities:	No cyanosis, clubbing or edema  • Vascular:	Equal and normal pulses (dorsalis pedis)  • Neurologica:l	not examined  • Skin:	No lesions; no rash  • Lymph Nodes:	No lymphadedenopathy  • Musculoskeletal:	No joint pain, swelling or deformity; no limitation of movement        LABS:      CBC Full  -  ( 21 Jul 2023 05:30 )  WBC Count : 14.32 K/uL  RBC Count : 3.82 M/uL  Hemoglobin : 11.5 g/dL  Hematocrit : 36.8 %  Platelet Count - Automated : 188 K/uL  Mean Cell Volume : 96.3 fl  Mean Cell Hemoglobin : 30.1 pg  Mean Cell Hemoglobin Concentration : 31.3 gm/dL  Auto Neutrophil # : 10.64 K/uL  Auto Lymphocyte # : 2.12 K/uL  Auto Monocyte # : 1.05 K/uL  Auto Eosinophil # : 0.04 K/uL  Auto Basophil # : 0.08 K/uL  Auto Neutrophil % : 74.3 %  Auto Lymphocyte % : 14.8 %  Auto Monocyte % : 7.3 %  Auto Eosinophil % : 0.3 %  Auto Basophil % : 0.6 %    07-21    139  |  108  |  42<H>  ----------------------------<  148<H>  4.6   |  23  |  1.25    Ca    7.6<L>      21 Jul 2023 05:30  Phos  2.5     07-21  Mg     1.9     07-21            Urinalysis Basic - ( 21 Jul 2023 05:30 )    Color: x / Appearance: x / SG: x / pH: x  Gluc: 148 mg/dL / Ketone: x  / Bili: x / Urobili: x   Blood: x / Protein: x / Nitrite: x   Leuk Esterase: x / RBC: x / WBC x   Sq Epi: x / Non Sq Epi: x / Bacteria: x                  RADIOLOGY & ADDITIONAL STUDIES (The following images were personally reviewed):  Salazar:                                     No  Urine output:                       adequate  DVT prophylaxis:                 Yes  Flattus:                                  Yes  Bowel movement:              No

## 2023-07-22 NOTE — CHART NOTE - NSCHARTNOTEFT_GEN_A_CORE
Glucose ranges overnight  66 mg/dL (07-21 @ 17:35)  99 mg/dL (07-21 @ 18:18)  319 mg/dL (07-21 @ 22:13)  100 mg/dL (07-22 @ 09:33)    # DM (diabetes mellitus), type 2.   Due to some low glucose ranges, please continue to hold lantus  please decrease lispro to 10 units TID with meals.  - Discharge recommendations to be discussed.   - Patient can follow up at discharge with [facility] by calling [phone] to make an appointment.      D/w Dr. Singleton

## 2023-07-23 LAB
ALBUMIN SERPL ELPH-MCNC: 3.1 G/DL — LOW (ref 3.3–5)
ALP SERPL-CCNC: 87 U/L — SIGNIFICANT CHANGE UP (ref 40–120)
ALT FLD-CCNC: 17 U/L — SIGNIFICANT CHANGE UP (ref 10–45)
ANION GAP SERPL CALC-SCNC: 10 MMOL/L — SIGNIFICANT CHANGE UP (ref 5–17)
AST SERPL-CCNC: 20 U/L — SIGNIFICANT CHANGE UP (ref 10–40)
BASOPHILS # BLD AUTO: 0.04 K/UL — SIGNIFICANT CHANGE UP (ref 0–0.2)
BASOPHILS NFR BLD AUTO: 0.3 % — SIGNIFICANT CHANGE UP (ref 0–2)
BILIRUB SERPL-MCNC: 0.2 MG/DL — SIGNIFICANT CHANGE UP (ref 0.2–1.2)
BUN SERPL-MCNC: 41 MG/DL — HIGH (ref 7–23)
CALCIUM SERPL-MCNC: 7.8 MG/DL — LOW (ref 8.4–10.5)
CHLORIDE SERPL-SCNC: 106 MMOL/L — SIGNIFICANT CHANGE UP (ref 96–108)
CO2 SERPL-SCNC: 19 MMOL/L — LOW (ref 22–31)
CREAT SERPL-MCNC: 1.28 MG/DL — SIGNIFICANT CHANGE UP (ref 0.5–1.3)
EGFR: 44 ML/MIN/1.73M2 — LOW
EOSINOPHIL # BLD AUTO: 0.09 K/UL — SIGNIFICANT CHANGE UP (ref 0–0.5)
EOSINOPHIL NFR BLD AUTO: 0.7 % — SIGNIFICANT CHANGE UP (ref 0–6)
GLUCOSE BLDC GLUCOMTR-MCNC: 113 MG/DL — HIGH (ref 70–99)
GLUCOSE BLDC GLUCOMTR-MCNC: 116 MG/DL — HIGH (ref 70–99)
GLUCOSE BLDC GLUCOMTR-MCNC: 130 MG/DL — HIGH (ref 70–99)
GLUCOSE BLDC GLUCOMTR-MCNC: 158 MG/DL — HIGH (ref 70–99)
GLUCOSE BLDC GLUCOMTR-MCNC: 206 MG/DL — HIGH (ref 70–99)
GLUCOSE SERPL-MCNC: 121 MG/DL — HIGH (ref 70–99)
HCT VFR BLD CALC: 37.4 % — SIGNIFICANT CHANGE UP (ref 34.5–45)
HGB BLD-MCNC: 11.8 G/DL — SIGNIFICANT CHANGE UP (ref 11.5–15.5)
IMM GRANULOCYTES NFR BLD AUTO: 1.8 % — HIGH (ref 0–0.9)
LYMPHOCYTES # BLD AUTO: 15.4 % — SIGNIFICANT CHANGE UP (ref 13–44)
LYMPHOCYTES # BLD AUTO: 2.01 K/UL — SIGNIFICANT CHANGE UP (ref 1–3.3)
MAGNESIUM SERPL-MCNC: 1.8 MG/DL — SIGNIFICANT CHANGE UP (ref 1.6–2.6)
MCHC RBC-ENTMCNC: 29.9 PG — SIGNIFICANT CHANGE UP (ref 27–34)
MCHC RBC-ENTMCNC: 31.6 GM/DL — LOW (ref 32–36)
MCV RBC AUTO: 94.7 FL — SIGNIFICANT CHANGE UP (ref 80–100)
MONOCYTES # BLD AUTO: 0.84 K/UL — SIGNIFICANT CHANGE UP (ref 0–0.9)
MONOCYTES NFR BLD AUTO: 6.4 % — SIGNIFICANT CHANGE UP (ref 2–14)
NEUTROPHILS # BLD AUTO: 9.81 K/UL — HIGH (ref 1.8–7.4)
NEUTROPHILS NFR BLD AUTO: 75.4 % — SIGNIFICANT CHANGE UP (ref 43–77)
NRBC # BLD: 0 /100 WBCS — SIGNIFICANT CHANGE UP (ref 0–0)
PHOSPHATE SERPL-MCNC: 2.9 MG/DL — SIGNIFICANT CHANGE UP (ref 2.5–4.5)
PLATELET # BLD AUTO: 191 K/UL — SIGNIFICANT CHANGE UP (ref 150–400)
POTASSIUM SERPL-MCNC: 4.5 MMOL/L — SIGNIFICANT CHANGE UP (ref 3.5–5.3)
POTASSIUM SERPL-SCNC: 4.5 MMOL/L — SIGNIFICANT CHANGE UP (ref 3.5–5.3)
PROT SERPL-MCNC: 6.4 G/DL — SIGNIFICANT CHANGE UP (ref 6–8.3)
RBC # BLD: 3.95 M/UL — SIGNIFICANT CHANGE UP (ref 3.8–5.2)
RBC # FLD: 13.4 % — SIGNIFICANT CHANGE UP (ref 10.3–14.5)
SODIUM SERPL-SCNC: 135 MMOL/L — SIGNIFICANT CHANGE UP (ref 135–145)
WBC # BLD: 13.03 K/UL — HIGH (ref 3.8–10.5)
WBC # FLD AUTO: 13.03 K/UL — HIGH (ref 3.8–10.5)

## 2023-07-23 PROCEDURE — 99232 SBSQ HOSP IP/OBS MODERATE 35: CPT

## 2023-07-23 RX ADMIN — AMLODIPINE BESYLATE 10 MILLIGRAM(S): 2.5 TABLET ORAL at 06:09

## 2023-07-23 RX ADMIN — Medication 4: at 13:51

## 2023-07-23 RX ADMIN — Medication 200 MILLIGRAM(S): at 18:14

## 2023-07-23 RX ADMIN — Medication 10 UNIT(S): at 13:52

## 2023-07-23 RX ADMIN — Medication 10 UNIT(S): at 09:57

## 2023-07-23 RX ADMIN — Medication 100 MILLIGRAM(S): at 09:59

## 2023-07-23 RX ADMIN — LIDOCAINE 1 PATCH: 4 CREAM TOPICAL at 18:25

## 2023-07-23 RX ADMIN — Medication 200 MILLIGRAM(S): at 06:09

## 2023-07-23 RX ADMIN — LOSARTAN POTASSIUM 100 MILLIGRAM(S): 100 TABLET, FILM COATED ORAL at 13:52

## 2023-07-23 RX ADMIN — LIDOCAINE 1 PATCH: 4 CREAM TOPICAL at 19:14

## 2023-07-23 RX ADMIN — Medication 1000 MILLIGRAM(S): at 06:14

## 2023-07-23 RX ADMIN — Medication 1 MILLIGRAM(S): at 18:13

## 2023-07-23 RX ADMIN — Medication 2 MILLIGRAM(S): at 18:14

## 2023-07-23 RX ADMIN — Medication 2 MILLIGRAM(S): at 06:09

## 2023-07-23 RX ADMIN — Medication 1000 MILLIGRAM(S): at 18:24

## 2023-07-23 RX ADMIN — Medication 10 UNIT(S): at 18:23

## 2023-07-23 RX ADMIN — LIDOCAINE 1 PATCH: 4 CREAM TOPICAL at 08:09

## 2023-07-23 NOTE — PROGRESS NOTE ADULT - PROBLEM SELECTOR PLAN 5
Insulin basal bolus and prandial adjusted  A1C results: 7.4  - f/u endocrine consult  - hold lantus tonight (7/21)  - lispro 12 units Insulin basal bolus and prandial adjusted  A1C results: 7.4  - f/u endocrine consult for discharge recs  - lispro 10 units TID with meals

## 2023-07-23 NOTE — PROGRESS NOTE ADULT - SUBJECTIVE AND OBJECTIVE BOX
SUBJECTIVE / INTERVAL HPI: Patient was seen and examined this morning.     Overnight events:    Endocrine course  7/21: FSG was 86, lantus was held, lispro 12 u with meals  7/22: FSG was 100, continued to hold lantus, lispro 10 with meals + MISS    CAPILLARY BLOOD GLUCOSE & INSULIN RECEIVED  100 mg/dL (07-22 @ 18:14) - lispro 10 units  167 mg/dL (07-22 @ 22:19) - N/A  113 mg/dL (07-23 @ 08:40)      REVIEW OF SYSTEMS  Constitutional:  Negative fever, chills or loss of appetite.  Eyes:  Negative blurry vision or double vision.  Cardiovascular:  Negative for chest pain or palpitations.  Respiratory:  Negative for cough, wheezing, or shortness of breath.    Gastrointestinal:  Negative for nausea, vomiting, diarrhea, constipation, or abdominal pain.  Genitourinary:  Negative frequency, urgency or dysuria.  Neurologic:  No headache, confusion, dizziness, lightheadedness.    PHYSICAL EXAM  Vital Signs Last 24 Hrs  T(C): 36.4 (23 Jul 2023 06:02), Max: 37.2 (22 Jul 2023 20:47)  T(F): 97.5 (23 Jul 2023 06:02), Max: 98.9 (22 Jul 2023 20:47)  HR: 60 (23 Jul 2023 06:02) (52 - 66)  BP: 162/77 (23 Jul 2023 06:02) (117/79 - 162/77)  BP(mean): --  RR: 18 (23 Jul 2023 06:02) (18 - 18)  SpO2: 98% (23 Jul 2023 06:02) (95% - 98%)    Parameters below as of 23 Jul 2023 06:02  Patient On (Oxygen Delivery Method): room air        Constitutional: Awake, alert, in no acute distress.   HEENT: Normocephalic, atraumatic, NEDAR.  Respiratory: Lungs clear to ausculation bilaterally.   Cardiovascular: regular rhythm, normal S1 and S2, no audible murmurs.   GI: soft, non-tender, non-distended, bowel sounds present.  Extremities: No lower extremity edema.  Psychiatric: AAO x 3. Normal affect/mood.     LABS  CBC - WBC/HGB/HTC/PLT: 13.03/11.8/37.4/191 (07-23-23)  BMP - Na/K/Cl/Bicarb/BUN/Cr/Gluc/AG/eGFR: 135/4.5/106/19/41/1.28/121/10/44 (07-23-23)  Ca - 7.8 (07-23-23)  Phos - 2.9 (07-23-23)  Mg - 1.8 (07-23-23)  LFT - Alb/Tprot/Tbili/Dbili/AlkPhos/ALT/AST: 3.1/--/0.2/--/87/17/20 (07-23-23)    Thyroid Stimulating Hormone, Serum: 0.824 (07-10-23)          MEDICATIONS  MEDICATIONS  (STANDING):  amLODIPine   Tablet 10 milliGRAM(s) Oral every 24 hours  dexAMETHasone     Tablet 2 milliGRAM(s) Oral every 12 hours  dextrose 5%. 1000 milliLiter(s) (100 mL/Hr) IV Continuous <Continuous>  dextrose 50% Injectable 25 Gram(s) IV Push once  dextrose 50% Injectable 12.5 Gram(s) IV Push once  dextrose 50% Injectable 25 Gram(s) IV Push once  folic acid 1 milliGRAM(s) Oral every 24 hours  glucagon  Injectable 1 milliGRAM(s) IntraMuscular once  hydroxychloroquine 200 milliGRAM(s) Oral every 12 hours  ibuprofen  Tablet. 600 milliGRAM(s) Oral once  insulin lispro (ADMELOG) corrective regimen sliding scale   SubCutaneous three times a day before meals  insulin lispro Injectable (ADMELOG) 10 Unit(s) SubCutaneous three times a day before meals  lidocaine   4% Patch 1 Patch Transdermal every 24 hours  losartan 100 milliGRAM(s) Oral every 24 hours  metoprolol succinate  milliGRAM(s) Oral every 24 hours  sulfaSALAzine 1000 milliGRAM(s) Oral every 12 hours    MEDICATIONS  (PRN):  acetaminophen     Tablet .. 650 milliGRAM(s) Oral every 6 hours PRN Temp greater or equal to 38.5C (101.3F), Mild Pain (1 - 3), Moderate Pain (4 - 6)  dextrose Oral Gel 15 Gram(s) Oral once PRN Blood Glucose LESS THAN 70 milliGRAM(s)/deciliter    ASSESSMENT / RECOMMENDATIONS    Ms. Minor is a 73 year old female with a PMHx of HTN, HLD, DM (home med farxiga and januvia), Right hip replacement, RA, CKD (Cr baseline ~2) who presented to the  ED for unsteadiness and dizziness x1 week, ccb stroke workup showing an incidental brain nodule which led to findings of stage IV NSCLC. Endocrinology was consulted for hyperglycemia mangement.  A1C: 7.4 %  BUN: 41  Creatinine: 1.28  GFR: 44  Weight: 68.8  BMI: 29.3    # Type 2 diabetes mellitus with hyperglycemia  - continue lispro to 10 units TID with meals.  - Discharge recommendations to be discussed.   - Patient can follow up at discharge with [facility] by calling [phone] to make an appointment.      Case discussed with Dr. Blackwood. Primary team updated.       Alix Harmon  Endocrinology Fellow    Service Pager: [phone]    SUBJECTIVE / INTERVAL HPI: Patient was seen and examined this morning.     Overnight events:  pt reports feeling well  she had pancakes, yogurt, white bread for breakfast  had sweet potatoes and fish for dinner    Endocrine course  7/21: FSG was 86, lantus was held, lispro 12 u with meals  7/22: FSG was 100, continued to hold lantus, lispro 10 with meals + MISS    CAPILLARY BLOOD GLUCOSE & INSULIN RECEIVED  100 mg/dL (07-22 @ 18:14) - lispro 10 units  167 mg/dL (07-22 @ 22:19) - N/A  113 mg/dL (07-23 @ 08:40)      REVIEW OF SYSTEMS  Constitutional:  Negative fever, chills or loss of appetite.  Eyes:  Negative blurry vision or double vision.  Cardiovascular:  Negative for chest pain or palpitations.  Respiratory:  Negative for cough, wheezing, or shortness of breath.    Gastrointestinal:  Negative for nausea, vomiting, diarrhea, constipation, or abdominal pain.  Genitourinary:  Negative frequency, urgency or dysuria.  Neurologic:  No headache, confusion, dizziness, lightheadedness.    PHYSICAL EXAM  Vital Signs Last 24 Hrs  T(C): 36.4 (23 Jul 2023 06:02), Max: 37.2 (22 Jul 2023 20:47)  T(F): 97.5 (23 Jul 2023 06:02), Max: 98.9 (22 Jul 2023 20:47)  HR: 60 (23 Jul 2023 06:02) (52 - 66)  BP: 162/77 (23 Jul 2023 06:02) (117/79 - 162/77)  BP(mean): --  RR: 18 (23 Jul 2023 06:02) (18 - 18)  SpO2: 98% (23 Jul 2023 06:02) (95% - 98%)    Parameters below as of 23 Jul 2023 06:02  Patient On (Oxygen Delivery Method): room air        Constitutional: Awake, alert, in no acute distress.   HEENT: Normocephalic, atraumatic, NEDRA.  Respiratory: Lungs clear to ausculation bilaterally.   Cardiovascular: regular rhythm, normal S1 and S2, no audible murmurs.   GI: soft, non-tender, non-distended, bowel sounds present.  Extremities: No lower extremity edema.  Psychiatric: AAO x 3. Normal affect/mood.     LABS  CBC - WBC/HGB/HTC/PLT: 13.03/11.8/37.4/191 (07-23-23)  BMP - Na/K/Cl/Bicarb/BUN/Cr/Gluc/AG/eGFR: 135/4.5/106/19/41/1.28/121/10/44 (07-23-23)  Ca - 7.8 (07-23-23)  Phos - 2.9 (07-23-23)  Mg - 1.8 (07-23-23)  LFT - Alb/Tprot/Tbili/Dbili/AlkPhos/ALT/AST: 3.1/--/0.2/--/87/17/20 (07-23-23)    Thyroid Stimulating Hormone, Serum: 0.824 (07-10-23)          MEDICATIONS  MEDICATIONS  (STANDING):  amLODIPine   Tablet 10 milliGRAM(s) Oral every 24 hours  dexAMETHasone     Tablet 2 milliGRAM(s) Oral every 12 hours  dextrose 5%. 1000 milliLiter(s) (100 mL/Hr) IV Continuous <Continuous>  dextrose 50% Injectable 25 Gram(s) IV Push once  dextrose 50% Injectable 12.5 Gram(s) IV Push once  dextrose 50% Injectable 25 Gram(s) IV Push once  folic acid 1 milliGRAM(s) Oral every 24 hours  glucagon  Injectable 1 milliGRAM(s) IntraMuscular once  hydroxychloroquine 200 milliGRAM(s) Oral every 12 hours  ibuprofen  Tablet. 600 milliGRAM(s) Oral once  insulin lispro (ADMELOG) corrective regimen sliding scale   SubCutaneous three times a day before meals  insulin lispro Injectable (ADMELOG) 10 Unit(s) SubCutaneous three times a day before meals  lidocaine   4% Patch 1 Patch Transdermal every 24 hours  losartan 100 milliGRAM(s) Oral every 24 hours  metoprolol succinate  milliGRAM(s) Oral every 24 hours  sulfaSALAzine 1000 milliGRAM(s) Oral every 12 hours    MEDICATIONS  (PRN):  acetaminophen     Tablet .. 650 milliGRAM(s) Oral every 6 hours PRN Temp greater or equal to 38.5C (101.3F), Mild Pain (1 - 3), Moderate Pain (4 - 6)  dextrose Oral Gel 15 Gram(s) Oral once PRN Blood Glucose LESS THAN 70 milliGRAM(s)/deciliter    ASSESSMENT / RECOMMENDATIONS    Ms. Minor is a 73 year old female with a PMHx of HTN, HLD, DM (home med farxiga and januvia), Right hip replacement, RA, CKD (Cr baseline ~2) who presented to the  ED for unsteadiness and dizziness x1 week, ccb stroke workup showing an incidental brain nodule which led to findings of stage IV NSCLC. Endocrinology was consulted for hyperglycemia mangement.  A1C: 7.4 %  BUN: 41  Creatinine: 1.28  GFR: 44  Weight: 68.8  BMI: 29.3    # Type 2 diabetes mellitus with hyperglycemia  - continue lispro to 10 units TID with meals.  - Discharge recommendations to be discussed.   - Patient can follow up at discharge with [facility] [place] [facility] by calling [phone] to make an appointment.      Case discussed with Dr. Blackwood. Primary team updated.       Alix Harmon  Endocrinology Fellow    Service Pager: [phone]    SUBJECTIVE / INTERVAL HPI: Patient was seen and examined this morning.     Overnight events:  pt reports feeling well  she had pancakes, yogurt, white bread for breakfast  had sweet potatoes and fish for dinner    Endocrine course  7/21: FSG was 86, lantus was held, lispro 12 u with meals  7/22: FSG was 100, continued to hold lantus, lispro 10 with meals + MISS    CAPILLARY BLOOD GLUCOSE & INSULIN RECEIVED  100 mg/dL (07-22 @ 18:14) - lispro 10 units  167 mg/dL (07-22 @ 22:19) - N/A  113 mg/dL (07-23 @ 08:40)      REVIEW OF SYSTEMS  Constitutional:  Negative fever, chills or loss of appetite.  Eyes:  Negative blurry vision or double vision.  Cardiovascular:  Negative for chest pain or palpitations.  Respiratory:  Negative for cough, wheezing, or shortness of breath.    Gastrointestinal:  Negative for nausea, vomiting, diarrhea, constipation, or abdominal pain.  Genitourinary:  Negative frequency, urgency or dysuria.  Neurologic:  No headache, confusion, dizziness, lightheadedness.    PHYSICAL EXAM  Vital Signs Last 24 Hrs  T(C): 36.4 (23 Jul 2023 06:02), Max: 37.2 (22 Jul 2023 20:47)  T(F): 97.5 (23 Jul 2023 06:02), Max: 98.9 (22 Jul 2023 20:47)  HR: 60 (23 Jul 2023 06:02) (52 - 66)  BP: 162/77 (23 Jul 2023 06:02) (117/79 - 162/77)  BP(mean): --  RR: 18 (23 Jul 2023 06:02) (18 - 18)  SpO2: 98% (23 Jul 2023 06:02) (95% - 98%)    Parameters below as of 23 Jul 2023 06:02  Patient On (Oxygen Delivery Method): room air        Constitutional: Awake, alert, in no acute distress.   HEENT: Normocephalic, atraumatic, NEDRA.  Respiratory: Lungs clear to ausculation bilaterally.   Cardiovascular: regular rhythm, normal S1 and S2, no audible murmurs.   GI: soft, non-tender, non-distended, bowel sounds present.  Extremities: No lower extremity edema.  Psychiatric: AAO x 3. Normal affect/mood.     LABS  CBC - WBC/HGB/HTC/PLT: 13.03/11.8/37.4/191 (07-23-23)  BMP - Na/K/Cl/Bicarb/BUN/Cr/Gluc/AG/eGFR: 135/4.5/106/19/41/1.28/121/10/44 (07-23-23)  Ca - 7.8 (07-23-23)  Phos - 2.9 (07-23-23)  Mg - 1.8 (07-23-23)  LFT - Alb/Tprot/Tbili/Dbili/AlkPhos/ALT/AST: 3.1/--/0.2/--/87/17/20 (07-23-23)    Thyroid Stimulating Hormone, Serum: 0.824 (07-10-23)          MEDICATIONS  MEDICATIONS  (STANDING):  amLODIPine   Tablet 10 milliGRAM(s) Oral every 24 hours  dexAMETHasone     Tablet 2 milliGRAM(s) Oral every 12 hours  dextrose 5%. 1000 milliLiter(s) (100 mL/Hr) IV Continuous <Continuous>  dextrose 50% Injectable 25 Gram(s) IV Push once  dextrose 50% Injectable 12.5 Gram(s) IV Push once  dextrose 50% Injectable 25 Gram(s) IV Push once  folic acid 1 milliGRAM(s) Oral every 24 hours  glucagon  Injectable 1 milliGRAM(s) IntraMuscular once  hydroxychloroquine 200 milliGRAM(s) Oral every 12 hours  ibuprofen  Tablet. 600 milliGRAM(s) Oral once  insulin lispro (ADMELOG) corrective regimen sliding scale   SubCutaneous three times a day before meals  insulin lispro Injectable (ADMELOG) 10 Unit(s) SubCutaneous three times a day before meals  lidocaine   4% Patch 1 Patch Transdermal every 24 hours  losartan 100 milliGRAM(s) Oral every 24 hours  metoprolol succinate  milliGRAM(s) Oral every 24 hours  sulfaSALAzine 1000 milliGRAM(s) Oral every 12 hours    MEDICATIONS  (PRN):  acetaminophen     Tablet .. 650 milliGRAM(s) Oral every 6 hours PRN Temp greater or equal to 38.5C (101.3F), Mild Pain (1 - 3), Moderate Pain (4 - 6)  dextrose Oral Gel 15 Gram(s) Oral once PRN Blood Glucose LESS THAN 70 milliGRAM(s)/deciliter    ASSESSMENT / RECOMMENDATIONS    Ms. Minor is a 73 year old female with a PMHx of HTN, HLD, DM (home med farxiga and januvia), Right hip replacement, RA, CKD (Cr baseline ~2) who presented to the  ED for unsteadiness and dizziness x1 week, ccb stroke workup showing an incidental brain nodule which led to findings of stage IV NSCLC. Endocrinology was consulted for hyperglycemia mangement.  A1C: 7.4 %  BUN: 41  Creatinine: 1.28  GFR: 44  Weight: 68.8  BMI: 29.3    # Type 2 diabetes mellitus with hyperglycemia  on decadron 2 bid  - continue lispro 10 units TID with meals.  - Discharge recommendations to be discussed.   - Patient can follow up at discharge with [facility] by calling [phone] to make an appointment.      Case discussed with Dr. Blackwood. Primary team updated.       Alix Harmon  Endocrinology Fellow    Service Pager: [phone]    SUBJECTIVE / INTERVAL HPI: Patient was seen and examined this morning.     Overnight events:  pt reports feeling well  she had pancakes, yogurt, white bread for breakfast  had sweet potatoes and fish for dinner    Endocrine course  7/21: FSG was 86, lantus was held, lispro 12 u with meals  7/22: FSG was 100, continued to hold lantus, lispro 10 with meals + MISS    CAPILLARY BLOOD GLUCOSE & INSULIN RECEIVED  100 mg/dL (07-22 @ 18:14) - lispro 10 units  167 mg/dL (07-22 @ 22:19) - N/A  113 mg/dL (07-23 @ 08:40)      REVIEW OF SYSTEMS  Constitutional:  Negative fever, chills or loss of appetite.  Eyes:  Negative blurry vision or double vision.  Cardiovascular:  Negative for chest pain or palpitations.  Respiratory:  Negative for cough, wheezing, or shortness of breath.    Gastrointestinal:  Negative for nausea, vomiting, diarrhea, constipation, or abdominal pain.  Genitourinary:  Negative frequency, urgency or dysuria.  Neurologic:  No headache, confusion, dizziness, lightheadedness.    PHYSICAL EXAM  Vital Signs Last 24 Hrs  T(C): 36.4 (23 Jul 2023 06:02), Max: 37.2 (22 Jul 2023 20:47)  T(F): 97.5 (23 Jul 2023 06:02), Max: 98.9 (22 Jul 2023 20:47)  HR: 60 (23 Jul 2023 06:02) (52 - 66)  BP: 162/77 (23 Jul 2023 06:02) (117/79 - 162/77)  BP(mean): --  RR: 18 (23 Jul 2023 06:02) (18 - 18)  SpO2: 98% (23 Jul 2023 06:02) (95% - 98%)    Parameters below as of 23 Jul 2023 06:02  Patient On (Oxygen Delivery Method): room air        Constitutional: Awake, alert, in no acute distress.   HEENT: Normocephalic, atraumatic, NEDRA.  Respiratory: Lungs clear to ausculation bilaterally.   Cardiovascular: regular rhythm, normal S1 and S2, no audible murmurs.   GI: soft, non-tender, non-distended, bowel sounds present.  Extremities: No lower extremity edema.  Psychiatric: AAO x 3. Normal affect/mood.     LABS  CBC - WBC/HGB/HTC/PLT: 13.03/11.8/37.4/191 (07-23-23)  BMP - Na/K/Cl/Bicarb/BUN/Cr/Gluc/AG/eGFR: 135/4.5/106/19/41/1.28/121/10/44 (07-23-23)  Ca - 7.8 (07-23-23)  Phos - 2.9 (07-23-23)  Mg - 1.8 (07-23-23)  LFT - Alb/Tprot/Tbili/Dbili/AlkPhos/ALT/AST: 3.1/--/0.2/--/87/17/20 (07-23-23)    Thyroid Stimulating Hormone, Serum: 0.824 (07-10-23)          MEDICATIONS  MEDICATIONS  (STANDING):  amLODIPine   Tablet 10 milliGRAM(s) Oral every 24 hours  dexAMETHasone     Tablet 2 milliGRAM(s) Oral every 12 hours  dextrose 5%. 1000 milliLiter(s) (100 mL/Hr) IV Continuous <Continuous>  dextrose 50% Injectable 25 Gram(s) IV Push once  dextrose 50% Injectable 12.5 Gram(s) IV Push once  dextrose 50% Injectable 25 Gram(s) IV Push once  folic acid 1 milliGRAM(s) Oral every 24 hours  glucagon  Injectable 1 milliGRAM(s) IntraMuscular once  hydroxychloroquine 200 milliGRAM(s) Oral every 12 hours  ibuprofen  Tablet. 600 milliGRAM(s) Oral once  insulin lispro (ADMELOG) corrective regimen sliding scale   SubCutaneous three times a day before meals  insulin lispro Injectable (ADMELOG) 10 Unit(s) SubCutaneous three times a day before meals  lidocaine   4% Patch 1 Patch Transdermal every 24 hours  losartan 100 milliGRAM(s) Oral every 24 hours  metoprolol succinate  milliGRAM(s) Oral every 24 hours  sulfaSALAzine 1000 milliGRAM(s) Oral every 12 hours    MEDICATIONS  (PRN):  acetaminophen     Tablet .. 650 milliGRAM(s) Oral every 6 hours PRN Temp greater or equal to 38.5C (101.3F), Mild Pain (1 - 3), Moderate Pain (4 - 6)  dextrose Oral Gel 15 Gram(s) Oral once PRN Blood Glucose LESS THAN 70 milliGRAM(s)/deciliter    ASSESSMENT / RECOMMENDATIONS    Ms. Minor is a 73 year old female with a PMHx of HTN, HLD, DM (home med farxiga and januvia), Right hip replacement, RA, CKD (Cr baseline ~2) who presented to the  ED for unsteadiness and dizziness x1 week, ccb stroke workup showing an incidental brain nodule which led to findings of stage IV NSCLC. Endocrinology was consulted for hyperglycemia mangement.  A1C: 7.4 %  BUN: 41  Creatinine: 1.28  GFR: 44  Weight: 68.8  BMI: 29.3    # Type 2 diabetes mellitus with hyperglycemia  on decadron 2 bid  - continue lispro 10 units TID with meals.  - Discharge recommendations to be discussed.   - Patient can follow up at discharge with [facility] by calling [phone] to make an appointment.      Case discussed with Dr. Singleton. Primary team updated.       Alix Harmon  Endocrinology Fellow    Service Pager: [phone]

## 2023-07-23 NOTE — PROGRESS NOTE ADULT - PROBLEM SELECTOR PLAN 2
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  These findings are best viewed on the cellblock. They are morphologically similar to those seen in the left lung FNA.  Hcslxa75.5, B12 [id number]  - s/p bronchoscopy 7/14/23

## 2023-07-23 NOTE — PROGRESS NOTE ADULT - SUBJECTIVE AND OBJECTIVE BOX
Interval Events: Reviewed  Patient seen and examined at bedside.    Patient is a 73y old  Female who presents with a chief complaint of Stroke (22 Jul 2023 06:27)  no acute event overnight, RA 96%, had BM      PAST MEDICAL & SURGICAL HISTORY:  HTN (hypertension)      HLD (hyperlipidemia)      DM (diabetes mellitus), type 2      Rheumatoid arthritis      Stage 4 chronic kidney disease      Degenerative joint disease (DJD) of lumbar spine      Osteopenia      S/P total right hip arthroplasty          MEDICATIONS:  Pulmonary:    Antimicrobials:  hydroxychloroquine 200 milliGRAM(s) Oral every 12 hours    Anticoagulants:    Cardiac:  amLODIPine   Tablet 10 milliGRAM(s) Oral every 24 hours  losartan 100 milliGRAM(s) Oral every 24 hours  metoprolol succinate  milliGRAM(s) Oral every 24 hours      Allergies    No Known Allergies    Intolerances        Vital Signs Last 24 Hrs  T(C): 36.4 (23 Jul 2023 06:02), Max: 37.2 (22 Jul 2023 20:47)  T(F): 97.5 (23 Jul 2023 06:02), Max: 98.9 (22 Jul 2023 20:47)  HR: 60 (23 Jul 2023 06:02) (52 - 66)  BP: 162/77 (23 Jul 2023 06:02) (117/79 - 162/77)  BP(mean): --  RR: 18 (23 Jul 2023 06:02) (18 - 18)  SpO2: 98% (23 Jul 2023 06:02) (95% - 98%)    Parameters below as of 23 Jul 2023 06:02  Patient On (Oxygen Delivery Method): room air            Review of Systems:   •	General: negative  •	Skin/Breast: negative  •	Ophthalmologic: negative  •	ENMT: negative  •	Respiratory and Thorax: negative  •	Cardiovascular: negative  •	Gastrointestinal: negative  •	Genitourinary: negative  •	Musculoskeletal: negative  •	Neurological: negative  •	Psychiatric: negative  •	Hematology/Lymphatics: negative  •	Endocrine: negative  •	Allergic/Immunologic: negative    Physical Exam:   • Constitutional:	elderly female, NAD   • Eyes:	EOMI; PERRL; no drainage or redness  • ENMT:	No oral lesions; no gross abnormalities  • Neck	no thyromegaly or nodules  • Breasts:	not examined  • Back:	No deformity or limitation of movement  • Respiratory:	Breath Sounds equal & clear to auscultation, no accessory muscle use  • Cardiovascular:	Regular rate & rhythm, normal S1, S2; no murmurs, gallops or rubs; no S3, S4  • Gastrointestinal:	Soft, non-tender, no hepatosplenomegaly, normal bowel sounds  • Genitourinary:	not examined  • Rectal: not examined  • Extremities:	No cyanosis, clubbing or edema  • Vascular:	Equal and normal pulses (dorsalis pedis)  • Neurologica:l	not examined  • Skin:	No lesions; no rash  • Lymph Nodes:	No lymphadedenopathy  • Musculoskeletal:	No joint pain, swelling or deformity; no limitation of movement        LABS:      CBC Full  -  ( 22 Jul 2023 05:30 )  WBC Count : 12.43 K/uL  RBC Count : 3.93 M/uL  Hemoglobin : 11.7 g/dL  Hematocrit : 38.1 %  Platelet Count - Automated : 170 K/uL  Mean Cell Volume : 96.9 fl  Mean Cell Hemoglobin : 29.8 pg  Mean Cell Hemoglobin Concentration : 30.7 gm/dL  Auto Neutrophil # : 9.05 K/uL  Auto Lymphocyte # : 1.99 K/uL  Auto Monocyte # : 0.88 K/uL  Auto Eosinophil # : 0.10 K/uL  Auto Basophil # : 0.07 K/uL  Auto Neutrophil % : 72.8 %  Auto Lymphocyte % : 16.0 %  Auto Monocyte % : 7.1 %  Auto Eosinophil % : 0.8 %  Auto Basophil % : 0.6 %    07-22    136  |  106  |  46<H>  ----------------------------<  93  4.7   |  18<L>  |  1.28    Ca    7.6<L>      22 Jul 2023 05:30  Phos  3.4     07-22  Mg     1.9     07-22            Urinalysis Basic - ( 22 Jul 2023 05:30 )    Color: x / Appearance: x / SG: x / pH: x  Gluc: 93 mg/dL / Ketone: x  / Bili: x / Urobili: x   Blood: x / Protein: x / Nitrite: x   Leuk Esterase: x / RBC: x / WBC x   Sq Epi: x / Non Sq Epi: x / Bacteria: x                  RADIOLOGY & ADDITIONAL STUDIES (The following images were personally reviewed):  Salazar:                                     No  Urine output:                       adequate  DVT prophylaxis:                 Yes  Flattus:                                  Yes  Bowel movement:              No

## 2023-07-23 NOTE — PROGRESS NOTE ADULT - SUBJECTIVE AND OBJECTIVE BOX
O/N Events: no acute events overnight.    Subjective/ROS: Patient seen and examined at bedside. Patient has no complaints. Blood sugars have improved since stopping outside cake snack.    Denies Fever/Chills, HA, CP, SOB, n/v, changes in bowel/urinary habits.  12pt ROS otherwise negative.    VITALS  Vital Signs Last 24 Hrs  T(C): 36.6 (23 Jul 2023 13:31), Max: 37.2 (22 Jul 2023 20:47)  T(F): 97.9 (23 Jul 2023 13:31), Max: 98.9 (22 Jul 2023 20:47)  HR: 73 (23 Jul 2023 13:31) (52 - 73)  BP: 145/72 (23 Jul 2023 13:31) (117/79 - 162/77)  BP(mean): --  RR: 18 (23 Jul 2023 13:31) (18 - 18)  SpO2: 97% (23 Jul 2023 13:31) (95% - 98%)    Parameters below as of 23 Jul 2023 13:31  Patient On (Oxygen Delivery Method): room air        CAPILLARY BLOOD GLUCOSE      POCT Blood Glucose.: 206 mg/dL (23 Jul 2023 13:19)  POCT Blood Glucose.: 116 mg/dL (23 Jul 2023 09:54)  POCT Blood Glucose.: 113 mg/dL (23 Jul 2023 08:40)  POCT Blood Glucose.: 167 mg/dL (22 Jul 2023 22:19)  POCT Blood Glucose.: 100 mg/dL (22 Jul 2023 18:14)      PHYSICAL EXAM  General: NAD  Head: NC/AT; PERRL; EOMI;  Neck: Supple; no JVD  Respiratory: CTAB; no wheezes  Cardiovascular: Regular rhythm/rate; S1/S2+, no murmurs  Gastrointestinal: Soft; NTND;   Extremities: WWP; no edema, improving range of motion of left shoulder  Neurological: A&Ox3, CNII-XII grossly intact; no obvious focal deficits    MEDICATIONS  (STANDING):  amLODIPine   Tablet 10 milliGRAM(s) Oral every 24 hours  dexAMETHasone     Tablet 2 milliGRAM(s) Oral every 12 hours  dextrose 5%. 1000 milliLiter(s) (100 mL/Hr) IV Continuous <Continuous>  dextrose 50% Injectable 25 Gram(s) IV Push once  dextrose 50% Injectable 12.5 Gram(s) IV Push once  dextrose 50% Injectable 25 Gram(s) IV Push once  folic acid 1 milliGRAM(s) Oral every 24 hours  glucagon  Injectable 1 milliGRAM(s) IntraMuscular once  hydroxychloroquine 200 milliGRAM(s) Oral every 12 hours  ibuprofen  Tablet. 600 milliGRAM(s) Oral once  insulin lispro (ADMELOG) corrective regimen sliding scale   SubCutaneous three times a day before meals  insulin lispro Injectable (ADMELOG) 10 Unit(s) SubCutaneous three times a day before meals  lidocaine   4% Patch 1 Patch Transdermal every 24 hours  losartan 100 milliGRAM(s) Oral every 24 hours  metoprolol succinate  milliGRAM(s) Oral every 24 hours  sulfaSALAzine 1000 milliGRAM(s) Oral every 12 hours    MEDICATIONS  (PRN):  acetaminophen     Tablet .. 650 milliGRAM(s) Oral every 6 hours PRN Temp greater or equal to 38.5C (101.3F), Mild Pain (1 - 3), Moderate Pain (4 - 6)  dextrose Oral Gel 15 Gram(s) Oral once PRN Blood Glucose LESS THAN 70 milliGRAM(s)/deciliter      No Known Allergies      LABS                        11.8   13.03 )-----------( 191      ( 23 Jul 2023 05:30 )             37.4     07-23    135  |  106  |  41<H>  ----------------------------<  121<H>  4.5   |  19<L>  |  1.28    Ca    7.8<L>      23 Jul 2023 05:30  Phos  2.9     07-23  Mg     1.8     07-23    TPro  6.4  /  Alb  3.1<L>  /  TBili  0.2  /  DBili  x   /  AST  20  /  ALT  17  /  AlkPhos  87  07-23      Urinalysis Basic - ( 23 Jul 2023 05:30 )    Color: x / Appearance: x / SG: x / pH: x  Gluc: 121 mg/dL / Ketone: x  / Bili: x / Urobili: x   Blood: x / Protein: x / Nitrite: x   Leuk Esterase: x / RBC: x / WBC x   Sq Epi: x / Non Sq Epi: x / Bacteria: x              IMAGING/EKG/ETC

## 2023-07-23 NOTE — PROGRESS NOTE ADULT - PROBLEM SELECTOR PLAN 2
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  These findings are best viewed on the cellblock. They are morphologically similar to those seen in the left lung FNA.  [id number], B12 1884  - s/p bronchoscopy 7/14/23

## 2023-07-24 ENCOUNTER — TRANSCRIPTION ENCOUNTER (OUTPATIENT)
Age: 73
End: 2023-07-24

## 2023-07-24 VITALS
SYSTOLIC BLOOD PRESSURE: 121 MMHG | OXYGEN SATURATION: 96 % | RESPIRATION RATE: 18 BRPM | TEMPERATURE: 97 F | HEART RATE: 58 BPM | DIASTOLIC BLOOD PRESSURE: 71 MMHG

## 2023-07-24 LAB
ALBUMIN SERPL ELPH-MCNC: 3 G/DL — LOW (ref 3.3–5)
ALP SERPL-CCNC: 103 U/L — SIGNIFICANT CHANGE UP (ref 40–120)
ALT FLD-CCNC: 16 U/L — SIGNIFICANT CHANGE UP (ref 10–45)
ANION GAP SERPL CALC-SCNC: 9 MMOL/L — SIGNIFICANT CHANGE UP (ref 5–17)
AST SERPL-CCNC: 20 U/L — SIGNIFICANT CHANGE UP (ref 10–40)
BASOPHILS # BLD AUTO: 0.07 K/UL — SIGNIFICANT CHANGE UP (ref 0–0.2)
BASOPHILS NFR BLD AUTO: 0.6 % — SIGNIFICANT CHANGE UP (ref 0–2)
BILIRUB SERPL-MCNC: 0.2 MG/DL — SIGNIFICANT CHANGE UP (ref 0.2–1.2)
BUN SERPL-MCNC: 41 MG/DL — HIGH (ref 7–23)
CALCIUM SERPL-MCNC: 8 MG/DL — LOW (ref 8.4–10.5)
CHLORIDE SERPL-SCNC: 107 MMOL/L — SIGNIFICANT CHANGE UP (ref 96–108)
CO2 SERPL-SCNC: 19 MMOL/L — LOW (ref 22–31)
CREAT SERPL-MCNC: 1.26 MG/DL — SIGNIFICANT CHANGE UP (ref 0.5–1.3)
EGFR: 45 ML/MIN/1.73M2 — LOW
EOSINOPHIL # BLD AUTO: 0.14 K/UL — SIGNIFICANT CHANGE UP (ref 0–0.5)
EOSINOPHIL NFR BLD AUTO: 1.1 % — SIGNIFICANT CHANGE UP (ref 0–6)
GLUCOSE BLDC GLUCOMTR-MCNC: 109 MG/DL — HIGH (ref 70–99)
GLUCOSE BLDC GLUCOMTR-MCNC: 87 MG/DL — SIGNIFICANT CHANGE UP (ref 70–99)
GLUCOSE SERPL-MCNC: 113 MG/DL — HIGH (ref 70–99)
HCT VFR BLD CALC: 36.9 % — SIGNIFICANT CHANGE UP (ref 34.5–45)
HGB BLD-MCNC: 11.7 G/DL — SIGNIFICANT CHANGE UP (ref 11.5–15.5)
IMM GRANULOCYTES NFR BLD AUTO: 1.9 % — HIGH (ref 0–0.9)
LYMPHOCYTES # BLD AUTO: 16.3 % — SIGNIFICANT CHANGE UP (ref 13–44)
LYMPHOCYTES # BLD AUTO: 2 K/UL — SIGNIFICANT CHANGE UP (ref 1–3.3)
MAGNESIUM SERPL-MCNC: 1.8 MG/DL — SIGNIFICANT CHANGE UP (ref 1.6–2.6)
MCHC RBC-ENTMCNC: 30.4 PG — SIGNIFICANT CHANGE UP (ref 27–34)
MCHC RBC-ENTMCNC: 31.7 GM/DL — LOW (ref 32–36)
MCV RBC AUTO: 95.8 FL — SIGNIFICANT CHANGE UP (ref 80–100)
MONOCYTES # BLD AUTO: 0.97 K/UL — HIGH (ref 0–0.9)
MONOCYTES NFR BLD AUTO: 7.9 % — SIGNIFICANT CHANGE UP (ref 2–14)
NEUTROPHILS # BLD AUTO: 8.89 K/UL — HIGH (ref 1.8–7.4)
NEUTROPHILS NFR BLD AUTO: 72.2 % — SIGNIFICANT CHANGE UP (ref 43–77)
NRBC # BLD: 0 /100 WBCS — SIGNIFICANT CHANGE UP (ref 0–0)
PHOSPHATE SERPL-MCNC: 2.7 MG/DL — SIGNIFICANT CHANGE UP (ref 2.5–4.5)
PLATELET # BLD AUTO: 182 K/UL — SIGNIFICANT CHANGE UP (ref 150–400)
POTASSIUM SERPL-MCNC: 4.7 MMOL/L — SIGNIFICANT CHANGE UP (ref 3.5–5.3)
POTASSIUM SERPL-SCNC: 4.7 MMOL/L — SIGNIFICANT CHANGE UP (ref 3.5–5.3)
PROT SERPL-MCNC: 6.4 G/DL — SIGNIFICANT CHANGE UP (ref 6–8.3)
RBC # BLD: 3.85 M/UL — SIGNIFICANT CHANGE UP (ref 3.8–5.2)
RBC # FLD: 13.5 % — SIGNIFICANT CHANGE UP (ref 10.3–14.5)
SODIUM SERPL-SCNC: 135 MMOL/L — SIGNIFICANT CHANGE UP (ref 135–145)
WBC # BLD: 12.3 K/UL — HIGH (ref 3.8–10.5)
WBC # FLD AUTO: 12.3 K/UL — HIGH (ref 3.8–10.5)

## 2023-07-24 PROCEDURE — 87340 HEPATITIS B SURFACE AG IA: CPT

## 2023-07-24 PROCEDURE — 86705 HEP B CORE ANTIBODY IGM: CPT

## 2023-07-24 PROCEDURE — 88112 CYTOPATH CELL ENHANCE TECH: CPT

## 2023-07-24 PROCEDURE — 83735 ASSAY OF MAGNESIUM: CPT

## 2023-07-24 PROCEDURE — 80053 COMPREHEN METABOLIC PANEL: CPT

## 2023-07-24 PROCEDURE — 97112 NEUROMUSCULAR REEDUCATION: CPT

## 2023-07-24 PROCEDURE — 36415 COLL VENOUS BLD VENIPUNCTURE: CPT

## 2023-07-24 PROCEDURE — 77295 3-D RADIOTHERAPY PLAN: CPT

## 2023-07-24 PROCEDURE — 86704 HEP B CORE ANTIBODY TOTAL: CPT

## 2023-07-24 PROCEDURE — 82962 GLUCOSE BLOOD TEST: CPT

## 2023-07-24 PROCEDURE — 73030 X-RAY EXAM OF SHOULDER: CPT

## 2023-07-24 PROCEDURE — 85027 COMPLETE CBC AUTOMATED: CPT

## 2023-07-24 PROCEDURE — 93306 TTE W/DOPPLER COMPLETE: CPT

## 2023-07-24 PROCEDURE — 82607 VITAMIN B-12: CPT

## 2023-07-24 PROCEDURE — 77370 RADIATION PHYSICS CONSULT: CPT

## 2023-07-24 PROCEDURE — 85730 THROMBOPLASTIN TIME PARTIAL: CPT

## 2023-07-24 PROCEDURE — S2900: CPT

## 2023-07-24 PROCEDURE — 83036 HEMOGLOBIN GLYCOSYLATED A1C: CPT

## 2023-07-24 PROCEDURE — 85025 COMPLETE CBC W/AUTO DIFF WBC: CPT

## 2023-07-24 PROCEDURE — 88173 CYTOPATH EVAL FNA REPORT: CPT

## 2023-07-24 PROCEDURE — 86850 RBC ANTIBODY SCREEN: CPT

## 2023-07-24 PROCEDURE — 70553 MRI BRAIN STEM W/O & W/DYE: CPT

## 2023-07-24 PROCEDURE — 97165 OT EVAL LOW COMPLEX 30 MIN: CPT

## 2023-07-24 PROCEDURE — 97110 THERAPEUTIC EXERCISES: CPT

## 2023-07-24 PROCEDURE — 99232 SBSQ HOSP IP/OBS MODERATE 35: CPT | Mod: GC

## 2023-07-24 PROCEDURE — 99285 EMERGENCY DEPT VISIT HI MDM: CPT

## 2023-07-24 PROCEDURE — 70547 MR ANGIOGRAPHY NECK W/O DYE: CPT

## 2023-07-24 PROCEDURE — 86900 BLOOD TYPING SEROLOGIC ABO: CPT

## 2023-07-24 PROCEDURE — A9585: CPT

## 2023-07-24 PROCEDURE — 97116 GAIT TRAINING THERAPY: CPT

## 2023-07-24 PROCEDURE — 80048 BASIC METABOLIC PNL TOTAL CA: CPT

## 2023-07-24 PROCEDURE — 80061 LIPID PANEL: CPT

## 2023-07-24 PROCEDURE — 77300 RADIATION THERAPY DOSE PLAN: CPT

## 2023-07-24 PROCEDURE — 82746 ASSAY OF FOLIC ACID SERUM: CPT

## 2023-07-24 PROCEDURE — 74177 CT ABD & PELVIS W/CONTRAST: CPT

## 2023-07-24 PROCEDURE — 88341 IMHCHEM/IMCYTCHM EA ADD ANTB: CPT

## 2023-07-24 PROCEDURE — 97535 SELF CARE MNGMENT TRAINING: CPT

## 2023-07-24 PROCEDURE — 77373 STRTCTC BDY RAD THER TX DLVR: CPT

## 2023-07-24 PROCEDURE — 71260 CT THORAX DX C+: CPT

## 2023-07-24 PROCEDURE — 71045 X-RAY EXAM CHEST 1 VIEW: CPT

## 2023-07-24 PROCEDURE — 85610 PROTHROMBIN TIME: CPT

## 2023-07-24 PROCEDURE — 84100 ASSAY OF PHOSPHORUS: CPT

## 2023-07-24 PROCEDURE — 97162 PT EVAL MOD COMPLEX 30 MIN: CPT

## 2023-07-24 PROCEDURE — 97530 THERAPEUTIC ACTIVITIES: CPT

## 2023-07-24 PROCEDURE — 70544 MR ANGIOGRAPHY HEAD W/O DYE: CPT

## 2023-07-24 PROCEDURE — 84484 ASSAY OF TROPONIN QUANT: CPT

## 2023-07-24 PROCEDURE — C1757: CPT

## 2023-07-24 PROCEDURE — 88305 TISSUE EXAM BY PATHOLOGIST: CPT

## 2023-07-24 PROCEDURE — 93005 ELECTROCARDIOGRAM TRACING: CPT

## 2023-07-24 PROCEDURE — 86706 HEP B SURFACE ANTIBODY: CPT

## 2023-07-24 PROCEDURE — 86901 BLOOD TYPING SEROLOGIC RH(D): CPT

## 2023-07-24 PROCEDURE — 77334 RADIATION TREATMENT AID(S): CPT

## 2023-07-24 PROCEDURE — 92523 SPEECH SOUND LANG COMPREHEN: CPT

## 2023-07-24 PROCEDURE — 87635 SARS-COV-2 COVID-19 AMP PRB: CPT

## 2023-07-24 PROCEDURE — 84443 ASSAY THYROID STIM HORMONE: CPT

## 2023-07-24 PROCEDURE — 70450 CT HEAD/BRAIN W/O DYE: CPT | Mod: MA

## 2023-07-24 PROCEDURE — 77290 THER RAD SIMULAJ FIELD CPLX: CPT

## 2023-07-24 PROCEDURE — 81001 URINALYSIS AUTO W/SCOPE: CPT

## 2023-07-24 PROCEDURE — 70551 MRI BRAIN STEM W/O DYE: CPT | Mod: MA

## 2023-07-24 RX ORDER — PREGABALIN 225 MG/1
1000 CAPSULE ORAL ONCE
Refills: 0 | Status: COMPLETED | OUTPATIENT
Start: 2023-07-24 | End: 2023-07-24

## 2023-07-24 RX ORDER — INSULIN LISPRO 100/ML
2 VIAL (ML) SUBCUTANEOUS
Qty: 0 | Refills: 0 | DISCHARGE
Start: 2023-07-24

## 2023-07-24 RX ORDER — INSULIN ASPART 100 [IU]/ML
5 INJECTION, SOLUTION SUBCUTANEOUS
Qty: 0 | Refills: 0 | DISCHARGE

## 2023-07-24 RX ORDER — DAPAGLIFLOZIN 10 MG/1
1 TABLET, FILM COATED ORAL
Qty: 0 | Refills: 0 | DISCHARGE

## 2023-07-24 RX ORDER — HYDRALAZINE HCL 50 MG
1 TABLET ORAL
Qty: 0 | Refills: 0 | DISCHARGE

## 2023-07-24 RX ORDER — DEXAMETHASONE 0.5 MG/5ML
1 ELIXIR ORAL
Qty: 0 | Refills: 0 | DISCHARGE
Start: 2023-07-24

## 2023-07-24 RX ORDER — INSULIN DETEMIR 100/ML (3)
12 INSULIN PEN (ML) SUBCUTANEOUS
Qty: 0 | Refills: 0 | DISCHARGE

## 2023-07-24 RX ORDER — SITAGLIPTIN 50 MG/1
1 TABLET, FILM COATED ORAL
Qty: 0 | Refills: 0 | DISCHARGE

## 2023-07-24 RX ORDER — LIDOCAINE 4 G/100G
1 CREAM TOPICAL
Qty: 0 | Refills: 0 | DISCHARGE
Start: 2023-07-24

## 2023-07-24 RX ORDER — INSULIN LISPRO 100/ML
5 VIAL (ML) SUBCUTANEOUS
Qty: 0 | Refills: 0 | DISCHARGE
Start: 2023-07-24

## 2023-07-24 RX ADMIN — Medication 200 MILLIGRAM(S): at 06:08

## 2023-07-24 RX ADMIN — Medication 10 UNIT(S): at 09:27

## 2023-07-24 RX ADMIN — Medication 1000 MILLIGRAM(S): at 07:45

## 2023-07-24 RX ADMIN — Medication 2 MILLIGRAM(S): at 06:08

## 2023-07-24 RX ADMIN — AMLODIPINE BESYLATE 10 MILLIGRAM(S): 2.5 TABLET ORAL at 06:08

## 2023-07-24 RX ADMIN — LIDOCAINE 1 PATCH: 4 CREAM TOPICAL at 07:44

## 2023-07-24 RX ADMIN — PREGABALIN 1000 MICROGRAM(S): 225 CAPSULE ORAL at 10:34

## 2023-07-24 NOTE — PROGRESS NOTE ADULT - PROVIDER SPECIALTY LIST ADULT
Neurology
Endocrinology
Internal Medicine
Neurosurgery
Heme/Onc
Heme/Onc
Internal Medicine
Neurology
Internal Medicine
Internal Medicine
Endocrinology
Internal Medicine
Neurology
Internal Medicine

## 2023-07-24 NOTE — DISCHARGE NOTE NURSING/CASE MANAGEMENT/SOCIAL WORK - NSDCCRNAME_GEN_ALL_CORE_FT
[facility] [place] [address]

## 2023-07-24 NOTE — PROGRESS NOTE ADULT - PROBLEM SELECTOR PLAN 2
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  These findings are best viewed on the cellblock. They are morphologically similar to those seen in the left lung FNA.  Quhfld02.5, B12 1884  - s/p bronchoscopy 7/14/23  - one dose B12 1000 mcg given

## 2023-07-24 NOTE — PROGRESS NOTE ADULT - ASSESSMENT
Ms. Minor is a 73 year old female with a PMHx of HTN, HLD, DM (home med farxiga and januvia), Right hip replacement, RA, CKD (Cr baseline ~2) who presented to the  ED for unsteadiness and dizziness x1 week, ccb stroke workup showing an incidental brain nodule which led to findings of stage IV NSCLC. Endocrinology was consulted for hyperglycemia management.

## 2023-07-24 NOTE — DISCHARGE NOTE NURSING/CASE MANAGEMENT/SOCIAL WORK - NSDCPEFALRISK_GEN_ALL_CORE
For information on Fall & Injury Prevention, visit: [email or web address] OR  [email or web address] OR  https://www.cdc.gov/steadi/patient.html

## 2023-07-24 NOTE — PROGRESS NOTE ADULT - SUBJECTIVE AND OBJECTIVE BOX
INTERVAL HPI/OVERNIGHT EVENTS:  Interim reviewed;  No chief complaint;  Glucoses stable on present regimen of insulin        MEDICATIONS  (STANDING):  amLODIPine   Tablet 10 milliGRAM(s) Oral every 24 hours  cyanocobalamin Injectable 1000 MICROGram(s) IntraMuscular once  dexAMETHasone     Tablet 2 milliGRAM(s) Oral every 12 hours  dextrose 5%. 1000 milliLiter(s) (100 mL/Hr) IV Continuous <Continuous>  dextrose 50% Injectable 25 Gram(s) IV Push once  dextrose 50% Injectable 12.5 Gram(s) IV Push once  dextrose 50% Injectable 25 Gram(s) IV Push once  folic acid 1 milliGRAM(s) Oral every 24 hours  glucagon  Injectable 1 milliGRAM(s) IntraMuscular once  hydroxychloroquine 200 milliGRAM(s) Oral every 12 hours  ibuprofen  Tablet. 600 milliGRAM(s) Oral once  insulin lispro (ADMELOG) corrective regimen sliding scale   SubCutaneous Before meals and at bedtime  insulin lispro Injectable (ADMELOG) 10 Unit(s) SubCutaneous three times a day before meals  lidocaine   4% Patch 1 Patch Transdermal every 24 hours  losartan 100 milliGRAM(s) Oral every 24 hours  metoprolol succinate  milliGRAM(s) Oral every 24 hours  sulfaSALAzine 1000 milliGRAM(s) Oral every 12 hours    MEDICATIONS  (PRN):  acetaminophen     Tablet .. 650 milliGRAM(s) Oral every 6 hours PRN Temp greater or equal to 38.5C (101.3F), Mild Pain (1 - 3), Moderate Pain (4 - 6)  dextrose Oral Gel 15 Gram(s) Oral once PRN Blood Glucose LESS THAN 70 milliGRAM(s)/deciliter      Allergies    No Known Allergies    Intolerances        Vital Signs Last 24 Hrs  T(C): 36.6 (24 Jul 2023 06:07), Max: 37 (23 Jul 2023 20:55)  T(F): 97.8 (24 Jul 2023 06:07), Max: 98.6 (23 Jul 2023 20:55)  HR: 62 (24 Jul 2023 06:07) (61 - 73)  BP: 133/73 (24 Jul 2023 06:07) (133/73 - 145/72)  BP(mean): --  RR: 17 (24 Jul 2023 06:07) (17 - 18)  SpO2: 96% (24 Jul 2023 06:07) (96% - 98%)    Parameters below as of 24 Jul 2023 06:07  Patient On (Oxygen Delivery Method): room air              Constitutional:  Awake and alert    Eyes: NEDRA    ENMT: Negative    Neck: Supple    Back:  no tenderness     Respiratory:  clear    Cardiovascular: S1 S2    Gastrointestinal:  soft     Genitourinary:     Extremities:  no edema     Vascular:    Neurological:    Skin:    Lymph Nodes:            07-23 @ 07:01  -  07-24 @ 07:00  --------------------------------------------------------  IN: 0 mL / OUT: 850 mL / NET: -850 mL      LABS:                        11.7   12.30 )-----------( 182      ( 24 Jul 2023 08:12 )             36.9     07-24    135  |  107  |  41<H>  ----------------------------<  113<H>  4.7   |  19<L>  |  1.26    Ca    8.0<L>      24 Jul 2023 08:12  Phos  2.7     07-24  Mg     1.8     07-24    TPro  6.4  /  Alb  3.0<L>  /  TBili  0.2  /  DBili  x   /  AST  20  /  ALT  16  /  AlkPhos  103  07-24      Urinalysis Basic - ( 24 Jul 2023 08:12 )    Color: x / Appearance: x / SG: x / pH: x  Gluc: 113 mg/dL / Ketone: x  / Bili: x / Urobili: x   Blood: x / Protein: x / Nitrite: x   Leuk Esterase: x / RBC: x / WBC x   Sq Epi: x / Non Sq Epi: x / Bacteria: x        RADIOLOGY & ADDITIONAL TESTS:

## 2023-07-24 NOTE — PROGRESS NOTE ADULT - PROBLEM SELECTOR PLAN 1
- Please continue lantus *** units at bedtime.   - Continue lispro *** units before each meal.  - Continue lispro moderate / low dose sliding scale before meals and at bedtime.  - Patient's fingerstick glucose goal is 100-180 mg/dL.    - Discharge recommendations to be discussed.   - Patient can follow up at discharge with [facility] by calling [phone] to make an appointment.      Case discussed with Dr. Blackwood. Primary team updated. - Discharge on lispro 10 units before each meal, 5 before a small meal i.e. crackers for lunch.   - Patient can follow up at discharge with [facility] by calling [phone] to make an appointment.    Case discussed with Dr. Blackwood. Primary team updated.

## 2023-07-24 NOTE — PROGRESS NOTE ADULT - TIME BILLING
Patient seen and examined  Stable and ready for transfer to rehab on present regimen  RT   Oncology appointment tomorrow

## 2023-07-24 NOTE — DISCHARGE NOTE NURSING/CASE MANAGEMENT/SOCIAL WORK - PATIENT PORTAL LINK FT
You can access the FollowMyHealth Patient Portal offered by [facility] by registering at the following website: [email or web address]. By joining [facility]’s FollowMyHealth portal, you will also be able to view your health information using other applications (apps) compatible with our system.

## 2023-07-24 NOTE — PROGRESS NOTE ADULT - SUBJECTIVE AND OBJECTIVE BOX
SUBJECTIVE / INTERVAL HPI: Patient was seen and examined this morning.     CAPILLARY BLOOD GLUCOSE & INSULIN RECEIVED  113 mg/dL (07-23 @ 08:40):  116 mg/dL (07-23 @ 09:54): 10 + 4  206 mg/dL (07-23 @ 13:19) 10 + 0  130 mg/dL (07-23 @ 18:19) 10 + 0  158 mg/dL (07-23 @ 21:43)  -----  109 mg/dL (07-24 @ 09:19)   10 + 0      REVIEW OF SYSTEMS  Constitutional:  Negative fever, chills or loss of appetite.  Eyes:  Negative blurry vision or double vision.  Cardiovascular:  Negative for chest pain or palpitations.  Respiratory:  Negative for cough, wheezing, or shortness of breath.    Gastrointestinal:  Negative for nausea, vomiting, diarrhea, constipation, or abdominal pain.  Genitourinary:  Negative frequency, urgency or dysuria.  Neurologic:  No headache, confusion, dizziness, lightheadedness.    PHYSICAL EXAM  Vital Signs Last 24 Hrs  T(C): 36.6 (24 Jul 2023 06:07), Max: 37 (23 Jul 2023 20:55)  T(F): 97.8 (24 Jul 2023 06:07), Max: 98.6 (23 Jul 2023 20:55)  HR: 62 (24 Jul 2023 06:07) (61 - 73)  BP: 133/73 (24 Jul 2023 06:07) (133/73 - 145/72)  BP(mean): --  RR: 17 (24 Jul 2023 06:07) (17 - 18)  SpO2: 96% (24 Jul 2023 06:07) (96% - 98%)    Parameters below as of 24 Jul 2023 06:07  Patient On (Oxygen Delivery Method): room air    Constitutional: Awake, alert, in no acute distress.   HEENT: Normocephalic, atraumatic, NEDRA.  Respiratory: Lungs clear to ausculation bilaterally.   Cardiovascular: regular rhythm, normal S1 and S2, no audible murmurs.   GI: soft, non-tender, non-distended, bowel sounds present.  Extremities: No lower extremity edema.  Psychiatric: AAO x 3. Normal affect/mood.       LABS  CBC - WBC/HGB/HTC/PLT: 12.30/11.7/36.9/182 (07-24-23)  BMP - Na/K/Cl/Bicarb/BUN/Cr/Gluc/AG/eGFR: 135/4.7/107/19/41/1.26/113/9/45 (07-24-23)  Ca - 8.0 (07-24-23)  Phos - 2.7 (07-24-23)  Mg - 1.8 (07-24-23)  LFT - Alb/Tprot/Tbili/Dbili/AlkPhos/ALT/AST: 3.0/--/0.2/--/103/16/20 (07-24-23)    Thyroid Stimulating Hormone, Serum: 0.824 (07-10-23)      MEDICATIONS  MEDICATIONS  (STANDING):  amLODIPine   Tablet 10 milliGRAM(s) Oral every 24 hours  cyanocobalamin Injectable 1000 MICROGram(s) IntraMuscular once  dexAMETHasone     Tablet 2 milliGRAM(s) Oral every 12 hours  dextrose 5%. 1000 milliLiter(s) (100 mL/Hr) IV Continuous <Continuous>  dextrose 50% Injectable 25 Gram(s) IV Push once  dextrose 50% Injectable 12.5 Gram(s) IV Push once  dextrose 50% Injectable 25 Gram(s) IV Push once  folic acid 1 milliGRAM(s) Oral every 24 hours  glucagon  Injectable 1 milliGRAM(s) IntraMuscular once  hydroxychloroquine 200 milliGRAM(s) Oral every 12 hours  ibuprofen  Tablet. 600 milliGRAM(s) Oral once  insulin lispro (ADMELOG) corrective regimen sliding scale   SubCutaneous Before meals and at bedtime  insulin lispro Injectable (ADMELOG) 10 Unit(s) SubCutaneous three times a day before meals  lidocaine   4% Patch 1 Patch Transdermal every 24 hours  losartan 100 milliGRAM(s) Oral every 24 hours  metoprolol succinate  milliGRAM(s) Oral every 24 hours  sulfaSALAzine 1000 milliGRAM(s) Oral every 12 hours    MEDICATIONS  (PRN):  acetaminophen     Tablet .. 650 milliGRAM(s) Oral every 6 hours PRN Temp greater or equal to 38.5C (101.3F), Mild Pain (1 - 3), Moderate Pain (4 - 6)  dextrose Oral Gel 15 Gram(s) Oral once PRN Blood Glucose LESS THAN 70 milliGRAM(s)/decilite    A1C: 7.4 %  BUN: 41  Creatinine: 1.26  GFR: 45  Weight: 68.8  BMI: 29.3  EF: 65-70% (7/9/23)   SUBJECTIVE / INTERVAL HPI: Patient was seen and examined this morning.     CAPILLARY BLOOD GLUCOSE & INSULIN RECEIVED  113 mg/dL (07-23 @ 08:40):  116 mg/dL (07-23 @ 09:54): 10 + 4: 2 pancakes with syrup  206 mg/dL (07-23 @ 13:19) 10 + 0: Codfish  130 mg/dL (07-23 @ 18:19) 10 + 0: Peppers stuffed with ground beef and rice  158 mg/dL (07-23 @ 21:43)  -----  109 mg/dL (07-24 @ 09:19)   10 + 0: 2 pancakes with syrup, turkey wyatt, coffee w/o sugar      REVIEW OF SYSTEMS  Constitutional:  Negative fever, chills or loss of appetite.  Eyes:  Negative blurry vision or double vision.  Cardiovascular:  Negative for chest pain or palpitations.  Respiratory:  Negative for cough, wheezing, or shortness of breath.    Gastrointestinal:  Negative for nausea, vomiting, diarrhea, constipation, or abdominal pain.  Genitourinary:  Negative frequency, urgency or dysuria.  Neurologic:  No headache, confusion, dizziness, lightheadedness.    PHYSICAL EXAM  Vital Signs Last 24 Hrs  T(C): 36.6 (24 Jul 2023 06:07), Max: 37 (23 Jul 2023 20:55)  T(F): 97.8 (24 Jul 2023 06:07), Max: 98.6 (23 Jul 2023 20:55)  HR: 62 (24 Jul 2023 06:07) (61 - 73)  BP: 133/73 (24 Jul 2023 06:07) (133/73 - 145/72)  BP(mean): --  RR: 17 (24 Jul 2023 06:07) (17 - 18)  SpO2: 96% (24 Jul 2023 06:07) (96% - 98%)    Parameters below as of 24 Jul 2023 06:07  Patient On (Oxygen Delivery Method): room air    Constitutional: Awake, alert, in no acute distress.   HEENT: Normocephalic, atraumatic, NEDRA.  Respiratory: Lungs clear to ausculation bilaterally.   Cardiovascular: regular rhythm, normal S1 and S2, no audible murmurs.   GI: soft, non-tender, non-distended, bowel sounds present.  Extremities: No lower extremity edema.  Psychiatric: AAO x 3. Normal affect/mood.       LABS  CBC - WBC/HGB/HTC/PLT: 12.30/11.7/36.9/182 (07-24-23)  BMP - Na/K/Cl/Bicarb/BUN/Cr/Gluc/AG/eGFR: 135/4.7/107/19/41/1.26/113/9/45 (07-24-23)  Ca - 8.0 (07-24-23)  Phos - 2.7 (07-24-23)  Mg - 1.8 (07-24-23)  LFT - Alb/Tprot/Tbili/Dbili/AlkPhos/ALT/AST: 3.0/--/0.2/--/103/16/20 (07-24-23)    Thyroid Stimulating Hormone, Serum: 0.824 (07-10-23)      MEDICATIONS  MEDICATIONS  (STANDING):  amLODIPine   Tablet 10 milliGRAM(s) Oral every 24 hours  cyanocobalamin Injectable 1000 MICROGram(s) IntraMuscular once  dexAMETHasone     Tablet 2 milliGRAM(s) Oral every 12 hours  dextrose 5%. 1000 milliLiter(s) (100 mL/Hr) IV Continuous <Continuous>  dextrose 50% Injectable 25 Gram(s) IV Push once  dextrose 50% Injectable 12.5 Gram(s) IV Push once  dextrose 50% Injectable 25 Gram(s) IV Push once  folic acid 1 milliGRAM(s) Oral every 24 hours  glucagon  Injectable 1 milliGRAM(s) IntraMuscular once  hydroxychloroquine 200 milliGRAM(s) Oral every 12 hours  ibuprofen  Tablet. 600 milliGRAM(s) Oral once  insulin lispro (ADMELOG) corrective regimen sliding scale   SubCutaneous Before meals and at bedtime  insulin lispro Injectable (ADMELOG) 10 Unit(s) SubCutaneous three times a day before meals  lidocaine   4% Patch 1 Patch Transdermal every 24 hours  losartan 100 milliGRAM(s) Oral every 24 hours  metoprolol succinate  milliGRAM(s) Oral every 24 hours  sulfaSALAzine 1000 milliGRAM(s) Oral every 12 hours    MEDICATIONS  (PRN):  acetaminophen     Tablet .. 650 milliGRAM(s) Oral every 6 hours PRN Temp greater or equal to 38.5C (101.3F), Mild Pain (1 - 3), Moderate Pain (4 - 6)  dextrose Oral Gel 15 Gram(s) Oral once PRN Blood Glucose LESS THAN 70 milliGRAM(s)/decilite    A1C: 7.4 %  BUN: 41  Creatinine: 1.26  GFR: 45  Weight: 68.8  BMI: 29.3  EF: 65-70% (7/9/23)   SUBJECTIVE / INTERVAL HPI: Patient was seen and examined this morning, NAD.     CAPILLARY BLOOD GLUCOSE & INSULIN RECEIVED  113 mg/dL (07-23 @ 08:40):  116 mg/dL (07-23 @ 09:54): 10 + 4: 2 pancakes with syrup  206 mg/dL (07-23 @ 13:19) 10 + 0: Codfish  130 mg/dL (07-23 @ 18:19) 10 + 0: Peppers stuffed with ground beef and rice  158 mg/dL (07-23 @ 21:43)  -----  109 mg/dL (07-24 @ 09:19)   10 + 0: 2 pancakes with syrup, turkey wyatt, coffee w/o sugar      REVIEW OF SYSTEMS  Constitutional:  Negative fever, chills or loss of appetite.  Eyes:  Negative blurry vision or double vision.  Cardiovascular:  Negative for chest pain or palpitations.  Respiratory:  Negative for cough, wheezing, or shortness of breath.    Gastrointestinal:  Negative for nausea, vomiting, diarrhea, constipation, or abdominal pain.  Genitourinary:  Negative frequency, urgency or dysuria.  Neurologic:  No headache, confusion, dizziness, lightheadedness.    PHYSICAL EXAM  Vital Signs Last 24 Hrs  T(C): 36.6 (24 Jul 2023 06:07), Max: 37 (23 Jul 2023 20:55)  T(F): 97.8 (24 Jul 2023 06:07), Max: 98.6 (23 Jul 2023 20:55)  HR: 62 (24 Jul 2023 06:07) (61 - 73)  BP: 133/73 (24 Jul 2023 06:07) (133/73 - 145/72)  BP(mean): --  RR: 17 (24 Jul 2023 06:07) (17 - 18)  SpO2: 96% (24 Jul 2023 06:07) (96% - 98%)    Parameters below as of 24 Jul 2023 06:07  Patient On (Oxygen Delivery Method): room air    Constitutional: Awake, alert, in no acute distress.   HEENT: Normocephalic, atraumatic, NEDRA.  Respiratory: Lungs clear to ausculation bilaterally.   Cardiovascular: regular rhythm, normal S1 and S2, no audible murmurs.   GI: soft, non-tender, non-distended, bowel sounds present.  Extremities: No lower extremity edema.  Psychiatric: AAO x 3. Normal affect/mood.       LABS  CBC - WBC/HGB/HTC/PLT: 12.30/11.7/36.9/182 (07-24-23)  BMP - Na/K/Cl/Bicarb/BUN/Cr/Gluc/AG/eGFR: 135/4.7/107/19/41/1.26/113/9/45 (07-24-23)  Ca - 8.0 (07-24-23)  Phos - 2.7 (07-24-23)  Mg - 1.8 (07-24-23)  LFT - Alb/Tprot/Tbili/Dbili/AlkPhos/ALT/AST: 3.0/--/0.2/--/103/16/20 (07-24-23)    Thyroid Stimulating Hormone, Serum: 0.824 (07-10-23)      MEDICATIONS  MEDICATIONS  (STANDING):  amLODIPine   Tablet 10 milliGRAM(s) Oral every 24 hours  cyanocobalamin Injectable 1000 MICROGram(s) IntraMuscular once  dexAMETHasone     Tablet 2 milliGRAM(s) Oral every 12 hours  dextrose 5%. 1000 milliLiter(s) (100 mL/Hr) IV Continuous <Continuous>  dextrose 50% Injectable 25 Gram(s) IV Push once  dextrose 50% Injectable 12.5 Gram(s) IV Push once  dextrose 50% Injectable 25 Gram(s) IV Push once  folic acid 1 milliGRAM(s) Oral every 24 hours  glucagon  Injectable 1 milliGRAM(s) IntraMuscular once  hydroxychloroquine 200 milliGRAM(s) Oral every 12 hours  ibuprofen  Tablet. 600 milliGRAM(s) Oral once  insulin lispro (ADMELOG) corrective regimen sliding scale   SubCutaneous Before meals and at bedtime  insulin lispro Injectable (ADMELOG) 10 Unit(s) SubCutaneous three times a day before meals  lidocaine   4% Patch 1 Patch Transdermal every 24 hours  losartan 100 milliGRAM(s) Oral every 24 hours  metoprolol succinate  milliGRAM(s) Oral every 24 hours  sulfaSALAzine 1000 milliGRAM(s) Oral every 12 hours    MEDICATIONS  (PRN):  acetaminophen     Tablet .. 650 milliGRAM(s) Oral every 6 hours PRN Temp greater or equal to 38.5C (101.3F), Mild Pain (1 - 3), Moderate Pain (4 - 6)  dextrose Oral Gel 15 Gram(s) Oral once PRN Blood Glucose LESS THAN 70 milliGRAM(s)/decilite    A1C: 7.4 %  BUN: 41  Creatinine: 1.26  GFR: 45  Weight: 68.8  BMI: 29.3  EF: 65-70% (7/9/23)

## 2023-07-24 NOTE — PROGRESS NOTE ADULT - ATTENDING COMMENTS
Seen with oncology fellow .    73F with PMHx of HTN HLD, DM, R hip replacement, RA, CKD (Cr baseline ~2) presents to the  ED for unsteadiness and dizziness x1 week, underwent stroke workup with imaging findings negative for acute CVA however with incidental finding of brain mass suspicious for metastasis.    NSCLC with BMs. Pending liquid NGS. Plan for SRS to BM.  F/u on 7/25/2023.     Total time spent on encounter, including but not limited to counseling/coordination of care: 75 mis.
I evaluated the patient with the Oncology fellow, Dr Hernandez.   Pt admitted to the stroke service.  Imaging shows a chronic lacunar infarct as well as an 8mm enhancing cortical based lesion suspicious for metastasis.  This prompted a CT c/a/p to look for other findings of malignancy.  Unfortunately the chest CT demonstrated a R lung mass and an indeterminate liver lesion.    Agree with pulmonology consultation and consideration for bronchoscopic biopsy of the lung mass.  A clear understanding of her extent of disease at diagnosis is very important.  Therefore recommend MRI of the liver with contrast to further evaluate the liver lesion is recommended.    NSGY to weigh in on management of the cortical metastasis.     We discussed the imaging findings with the patient and she is aware of the concerns for metastatic malignancy.  Full discussion of prognosis deferred pending a formal pathologic diagnosis obtained from biopsy.
Pt seen on rounds this afternoon and events of the weekend reviewed.  Has no new complaints.  PO intake has been good.  Glucoses have been stable in the 110-160 range since yesterday AM except for a single mild spike to 206 pre-lunch yesterday  She remains on 10 units lispro premeal, no Lantus.  (Her glucoses continue to show a sharp drop overnight without basal insulin--reason for this is unclear).  She will be continuing the dexamethasone 2 mg q12h indefinitely.  --Will discharge on the current regimen of 10 units lispro premeal, no Lantus.  --Importance of consistent carb intake discussed  --she has previously taken insulin so does not need additional instruction, but was taking it only as correction doses.  I therefore emphasized that she is now to take the 10 units as a standing dose.  (She can continue her correction scale, which apparently starts at >200 mg%)  --Discussed her carb intake at her main meals.  Her usual carb at breakfast is two slices of bread, at supper is pasta.  Her lunch tends to be small, and she will often only have 4-5 crackers with cheese, etc.  Told her to decrease her lispro to 5 units for a small meal

## 2023-07-24 NOTE — PROGRESS NOTE ADULT - REASON FOR ADMISSION
Stroke

## 2023-07-24 NOTE — PROGRESS NOTE ADULT - PROBLEM SELECTOR PLAN 5
Insulin basal bolus and prandial adjusted  A1C results: 7.4  - f/u endocrine consult for discharge recs  - lispro 10 units TID with meals

## 2023-07-25 ENCOUNTER — APPOINTMENT (OUTPATIENT)
Dept: RHEUMATOLOGY | Facility: CLINIC | Age: 73
End: 2023-07-25

## 2023-07-25 ENCOUNTER — APPOINTMENT (OUTPATIENT)
Dept: HEMATOLOGY ONCOLOGY | Facility: CLINIC | Age: 73
End: 2023-07-25

## 2023-07-27 DIAGNOSIS — I12.9 HYPERTENSIVE CHRONIC KIDNEY DISEASE WITH STAGE 1 THROUGH STAGE 4 CHRONIC KIDNEY DISEASE, OR UNSPECIFIED CHRONIC KIDNEY DISEASE: ICD-10-CM

## 2023-07-27 DIAGNOSIS — R53.1 WEAKNESS: ICD-10-CM
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
## 2023-07-27 DIAGNOSIS — M25.512 PAIN IN LEFT SHOULDER: ICD-10-CM

## 2023-07-27 DIAGNOSIS — Z96.641 PRESENCE OF RIGHT ARTIFICIAL HIP JOINT: ICD-10-CM

## 2023-07-27 DIAGNOSIS — Z79.82 LONG TERM (CURRENT) USE OF ASPIRIN: ICD-10-CM

## 2023-07-27 DIAGNOSIS — M47.816 SPONDYLOSIS WITHOUT MYELOPATHY OR RADICULOPATHY, LUMBAR REGION: ICD-10-CM

## 2023-07-27 DIAGNOSIS — F14.90 COCAINE USE, UNSPECIFIED, UNCOMPLICATED: ICD-10-CM

## 2023-07-27 DIAGNOSIS — M85.80 OTHER SPECIFIED DISORDERS OF BONE DENSITY AND STRUCTURE, UNSPECIFIED SITE: ICD-10-CM

## 2023-07-27 DIAGNOSIS — R47.1 DYSARTHRIA AND ANARTHRIA: ICD-10-CM

## 2023-07-27 DIAGNOSIS — N39.498 OTHER SPECIFIED URINARY INCONTINENCE: ICD-10-CM

## 2023-07-27 DIAGNOSIS — Z79.899 OTHER LONG TERM (CURRENT) DRUG THERAPY: ICD-10-CM

## 2023-07-27 DIAGNOSIS — N81.4 UTEROVAGINAL PROLAPSE, UNSPECIFIED: ICD-10-CM

## 2023-07-27 DIAGNOSIS — G93.6 CEREBRAL EDEMA: ICD-10-CM

## 2023-07-27 DIAGNOSIS — E11.22 TYPE 2 DIABETES MELLITUS WITH DIABETIC CHRONIC KIDNEY DISEASE: ICD-10-CM

## 2023-07-27 DIAGNOSIS — C34.12 MALIGNANT NEOPLASM OF UPPER LOBE, LEFT BRONCHUS OR LUNG: ICD-10-CM

## 2023-07-27 DIAGNOSIS — Z79.4 LONG TERM (CURRENT) USE OF INSULIN: ICD-10-CM

## 2023-07-27 DIAGNOSIS — K76.9 LIVER DISEASE, UNSPECIFIED: ICD-10-CM

## 2023-07-27 DIAGNOSIS — Z87.891 PERSONAL HISTORY OF NICOTINE DEPENDENCE: ICD-10-CM

## 2023-07-27 DIAGNOSIS — Z91.81 HISTORY OF FALLING: ICD-10-CM

## 2023-07-27 DIAGNOSIS — N18.4 CHRONIC KIDNEY DISEASE, STAGE 4 (SEVERE): ICD-10-CM

## 2023-07-27 DIAGNOSIS — M06.9 RHEUMATOID ARTHRITIS, UNSPECIFIED: ICD-10-CM

## 2023-07-27 DIAGNOSIS — T38.0X5A ADVERSE EFFECT OF GLUCOCORTICOIDS AND SYNTHETIC ANALOGUES, INITIAL ENCOUNTER: ICD-10-CM

## 2023-07-27 DIAGNOSIS — E78.5 HYPERLIPIDEMIA, UNSPECIFIED: ICD-10-CM

## 2023-07-27 DIAGNOSIS — D13.6 BENIGN NEOPLASM OF PANCREAS: ICD-10-CM

## 2023-07-27 DIAGNOSIS — E11.65 TYPE 2 DIABETES MELLITUS WITH HYPERGLYCEMIA: ICD-10-CM

## 2023-07-27 DIAGNOSIS — C79.31 SECONDARY MALIGNANT NEOPLASM OF BRAIN: ICD-10-CM

## 2023-07-28 NOTE — PHYSICAL THERAPY INITIAL EVALUATION ADULT - MODALITIES TREATMENT COMMENTS
Pt. received Supine in bed, +NAD, Heplock, RLE KI.  Pt is a 71F admitted for s/p mechanical fall requiring R hip reduction now with RLE KI and has to maintain anterior and posterior hip precaution. Pt performed functional moblilty with a 1 person assist. Pt was able to ambulate with an unsteady gait, maintaining hip precaution and limited by fatigue. [facility] needs met and Team made aware. Patient unable to complete

## 2023-08-01 ENCOUNTER — TRANSCRIPTION ENCOUNTER (OUTPATIENT)
Age: 73
End: 2023-08-01

## 2023-08-05 ENCOUNTER — TRANSCRIPTION ENCOUNTER (OUTPATIENT)
Age: 73
End: 2023-08-05

## 2023-08-07 NOTE — HISTORY OF PRESENT ILLNESS
[de-identified] : Noemi Minor is a 73 year old female who presents to the clinic for initial consultation.\par \par 7/14/23: Surgical Pathology Report - Auth (Verified)\par \par Specimen(s) Submitted\par 1  Lingula forcep  biopsy\par \par Final Diagnosis\par 1.  Left lung, lingula, biopsy:\par -   Poorly differentiated adenocarcinoma, see note

## 2023-08-08 ENCOUNTER — APPOINTMENT (OUTPATIENT)
Dept: HEMATOLOGY ONCOLOGY | Facility: CLINIC | Age: 73
End: 2023-08-08

## 2023-08-15 ENCOUNTER — NON-APPOINTMENT (OUTPATIENT)
Age: 73
End: 2023-08-15

## 2023-08-17 NOTE — HISTORY OF PRESENT ILLNESS
[FreeTextEntry1] : 73F with PMHx of HTN HLD, DM, R hip replacement, RA, CKD (Cr baseline ~2) presents to the  ED for unsteadiness and dizziness x1 week, underwent stroke workup with imaging findings negative for acute CVA however with incidental finding of 0.9 cm right frontal lobe brain mass suspicious for metastasis.  Body imaging showed a 2.6 x 4.1 cm somewhat spiculated mass in the EM lung.  8/15/23 - OTV - Ms. Minor has completed 27 Gy / 27 Gy to the right brain.  She feels well.  No complaints.  Return for PTE.

## 2023-08-29 ENCOUNTER — APPOINTMENT (OUTPATIENT)
Dept: INTERNAL MEDICINE | Facility: CLINIC | Age: 73
End: 2023-08-29
Payer: MEDICARE

## 2023-08-29 VITALS — DIASTOLIC BLOOD PRESSURE: 80 MMHG | SYSTOLIC BLOOD PRESSURE: 145 MMHG

## 2023-08-29 LAB
ALBUMIN SERPL ELPH-MCNC: 4.1 G/DL
ALP BLD-CCNC: 341 U/L
ALT SERPL-CCNC: 18 U/L
ANION GAP SERPL CALC-SCNC: 7 MMOL/L
AST SERPL-CCNC: 18 U/L
BASOPHILS # BLD AUTO: 0.03 K/UL
BASOPHILS NFR BLD AUTO: 0.3 %
BILIRUB SERPL-MCNC: 0.3 MG/DL
BUN SERPL-MCNC: 26 MG/DL
CALCIUM SERPL-MCNC: 10.2 MG/DL
CHLORIDE SERPL-SCNC: 98 MMOL/L
CO2 SERPL-SCNC: 26 MMOL/L
CREAT SERPL-MCNC: 1.37 MG/DL
EGFR: 41 ML/MIN/1.73M2
EOSINOPHIL # BLD AUTO: 0.01 K/UL
EOSINOPHIL NFR BLD AUTO: 0.1 %
ESTIMATED AVERAGE GLUCOSE: 192 MG/DL
GLUCOSE SERPL-MCNC: 433 MG/DL
HBA1C MFR BLD HPLC: 8.3 %
HCT VFR BLD CALC: 38.4 %
HGB BLD-MCNC: 12.1 G/DL
IMM GRANULOCYTES NFR BLD AUTO: 1.2 %
LYMPHOCYTES # BLD AUTO: 1 K/UL
LYMPHOCYTES NFR BLD AUTO: 11.1 %
MAN DIFF?: NORMAL
MCHC RBC-ENTMCNC: 30.3 PG
MCHC RBC-ENTMCNC: 31.5 GM/DL
MCV RBC AUTO: 96.2 FL
MONOCYTES # BLD AUTO: 0.42 K/UL
MONOCYTES NFR BLD AUTO: 4.7 %
NEUTROPHILS # BLD AUTO: 7.46 K/UL
NEUTROPHILS NFR BLD AUTO: 82.6 %
PLATELET # BLD AUTO: 159 K/UL
POTASSIUM SERPL-SCNC: 4.8 MMOL/L
PROT SERPL-MCNC: 6.7 G/DL
RBC # BLD: 3.99 M/UL
RBC # FLD: 13.5 %
SODIUM SERPL-SCNC: 131 MMOL/L
WBC # FLD AUTO: 9.03 K/UL

## 2023-08-29 PROCEDURE — 99213 OFFICE O/P EST LOW 20 MIN: CPT | Mod: 25

## 2023-08-29 NOTE — HISTORY OF PRESENT ILLNESS
[FreeTextEntry1] : Recent in hospital with Syncope  Found to have Lung Cancer with metastases to Brain : Received RT to Brain  [de-identified] : Will be getting treatment for Ling cancer at [facility].   Immunotherapy Port to be placed at [facility]  Will start physical therapy at [place].  Glucoses were also increased; Getting Decadron 2 mg twice a day.  Now on Lantus 5 units at night and also Januvia Farxiga; Other medication without change  Appetie good

## 2023-08-29 NOTE — ASSESSMENT
[FreeTextEntry1] : Appears stable with new diagnosis of Lung Cancer with Braim Mets  Will be treated at [facility]  To check labs stat; Will likely need adjustment of Insulin  No change in other medication for now

## 2023-08-29 NOTE — PHYSICAL EXAM
[Declined Breast Exam] : declined breast exam  [Declined Rectal Exam] : declined rectal exam [Normal] : no rash [de-identified] : Good strenth both hands

## 2023-10-06 RX ORDER — SULFASALAZINE 500 MG/1
500 TABLET ORAL
Qty: 120 | Refills: 0 | Status: ACTIVE | COMMUNITY
Start: 2020-04-28 | End: 1900-01-01

## 2023-10-07 ENCOUNTER — INPATIENT (INPATIENT)
Facility: HOSPITAL | Age: 73
LOS: 10 days | Discharge: HOME HEALTH | DRG: 545 | End: 2023-10-18
Attending: INTERNAL MEDICINE | Admitting: INTERNAL MEDICINE
Payer: MEDICARE

## 2023-10-07 VITALS
TEMPERATURE: 98 F | SYSTOLIC BLOOD PRESSURE: 171 MMHG | RESPIRATION RATE: 16 BRPM | OXYGEN SATURATION: 97 % | WEIGHT: 139.99 LBS | DIASTOLIC BLOOD PRESSURE: 93 MMHG | HEART RATE: 88 BPM

## 2023-10-07 DIAGNOSIS — Z96.641 PRESENCE OF RIGHT ARTIFICIAL HIP JOINT: Chronic | ICD-10-CM

## 2023-10-07 LAB
ANION GAP SERPL CALC-SCNC: 14 MMOL/L — SIGNIFICANT CHANGE UP (ref 5–17)
BUN SERPL-MCNC: 14 MG/DL — SIGNIFICANT CHANGE UP (ref 7–23)
CALCIUM SERPL-MCNC: 9.4 MG/DL — SIGNIFICANT CHANGE UP (ref 8.4–10.5)
CHLORIDE SERPL-SCNC: 102 MMOL/L — SIGNIFICANT CHANGE UP (ref 96–108)
CO2 SERPL-SCNC: 20 MMOL/L — LOW (ref 22–31)
CREAT SERPL-MCNC: 1.01 MG/DL — SIGNIFICANT CHANGE UP (ref 0.5–1.3)
EGFR: 58 ML/MIN/1.73M2 — LOW
GLUCOSE BLDC GLUCOMTR-MCNC: 164 MG/DL — HIGH (ref 70–99)
GLUCOSE SERPL-MCNC: 122 MG/DL — HIGH (ref 70–99)
HCT VFR BLD CALC: 34.1 % — LOW (ref 34.5–45)
HGB BLD-MCNC: 11.4 G/DL — LOW (ref 11.5–15.5)
MCHC RBC-ENTMCNC: 29.3 PG — SIGNIFICANT CHANGE UP (ref 27–34)
MCHC RBC-ENTMCNC: 33.4 GM/DL — SIGNIFICANT CHANGE UP (ref 32–36)
MCV RBC AUTO: 87.7 FL — SIGNIFICANT CHANGE UP (ref 80–100)
NRBC # BLD: 0 /100 WBCS — SIGNIFICANT CHANGE UP (ref 0–0)
PLATELET # BLD AUTO: 234 K/UL — SIGNIFICANT CHANGE UP (ref 150–400)
POTASSIUM SERPL-MCNC: 3.8 MMOL/L — SIGNIFICANT CHANGE UP (ref 3.5–5.3)
POTASSIUM SERPL-SCNC: 3.8 MMOL/L — SIGNIFICANT CHANGE UP (ref 3.5–5.3)
RBC # BLD: 3.89 M/UL — SIGNIFICANT CHANGE UP (ref 3.8–5.2)
RBC # FLD: 14 % — SIGNIFICANT CHANGE UP (ref 10.3–14.5)
SODIUM SERPL-SCNC: 136 MMOL/L — SIGNIFICANT CHANGE UP (ref 135–145)
WBC # BLD: 11.54 K/UL — HIGH (ref 3.8–10.5)
WBC # FLD AUTO: 11.54 K/UL — HIGH (ref 3.8–10.5)

## 2023-10-07 PROCEDURE — 99285 EMERGENCY DEPT VISIT HI MDM: CPT

## 2023-10-07 PROCEDURE — 93010 ELECTROCARDIOGRAM REPORT: CPT

## 2023-10-07 RX ORDER — DEXTROSE 50 % IN WATER 50 %
25 SYRINGE (ML) INTRAVENOUS ONCE
Refills: 0 | Status: DISCONTINUED | OUTPATIENT
Start: 2023-10-07 | End: 2023-10-18

## 2023-10-07 RX ORDER — LANOLIN ALCOHOL/MO/W.PET/CERES
3 CREAM (GRAM) TOPICAL AT BEDTIME
Refills: 0 | Status: DISCONTINUED | OUTPATIENT
Start: 2023-10-07 | End: 2023-10-18

## 2023-10-07 RX ORDER — GLUCAGON INJECTION, SOLUTION 0.5 MG/.1ML
1 INJECTION, SOLUTION SUBCUTANEOUS ONCE
Refills: 0 | Status: DISCONTINUED | OUTPATIENT
Start: 2023-10-07 | End: 2023-10-18

## 2023-10-07 RX ORDER — DEXTROSE 50 % IN WATER 50 %
15 SYRINGE (ML) INTRAVENOUS ONCE
Refills: 0 | Status: DISCONTINUED | OUTPATIENT
Start: 2023-10-07 | End: 2023-10-18

## 2023-10-07 RX ORDER — INFLUENZA VIRUS VACCINE 15; 15; 15; 15 UG/.5ML; UG/.5ML; UG/.5ML; UG/.5ML
0.7 SUSPENSION INTRAMUSCULAR ONCE
Refills: 0 | Status: DISCONTINUED | OUTPATIENT
Start: 2023-10-07 | End: 2023-10-18

## 2023-10-07 RX ORDER — DEXTROSE 50 % IN WATER 50 %
12.5 SYRINGE (ML) INTRAVENOUS ONCE
Refills: 0 | Status: DISCONTINUED | OUTPATIENT
Start: 2023-10-07 | End: 2023-10-18

## 2023-10-07 RX ORDER — INSULIN LISPRO 100/ML
10 VIAL (ML) SUBCUTANEOUS
Refills: 0 | Status: DISCONTINUED | OUTPATIENT
Start: 2023-10-07 | End: 2023-10-08

## 2023-10-07 RX ORDER — ONDANSETRON 8 MG/1
4 TABLET, FILM COATED ORAL EVERY 8 HOURS
Refills: 0 | Status: DISCONTINUED | OUTPATIENT
Start: 2023-10-07 | End: 2023-10-07

## 2023-10-07 RX ORDER — INSULIN LISPRO 100/ML
VIAL (ML) SUBCUTANEOUS
Refills: 0 | Status: DISCONTINUED | OUTPATIENT
Start: 2023-10-07 | End: 2023-10-18

## 2023-10-07 RX ORDER — ACETAMINOPHEN 500 MG
650 TABLET ORAL EVERY 6 HOURS
Refills: 0 | Status: DISCONTINUED | OUTPATIENT
Start: 2023-10-07 | End: 2023-10-18

## 2023-10-07 RX ORDER — SODIUM CHLORIDE 9 MG/ML
1000 INJECTION, SOLUTION INTRAVENOUS
Refills: 0 | Status: DISCONTINUED | OUTPATIENT
Start: 2023-10-07 | End: 2023-10-18

## 2023-10-07 RX ORDER — ACETAMINOPHEN 500 MG
1000 TABLET ORAL ONCE
Refills: 0 | Status: COMPLETED | OUTPATIENT
Start: 2023-10-07 | End: 2023-10-07

## 2023-10-07 RX ADMIN — Medication 60 MILLIGRAM(S): at 22:02

## 2023-10-07 RX ADMIN — Medication 1: at 22:37

## 2023-10-07 RX ADMIN — Medication 400 MILLIGRAM(S): at 20:34

## 2023-10-07 NOTE — ED PROVIDER NOTE - PHYSICAL EXAMINATION
CONSTITUTIONAL: Non-toxic; in no apparent distress  HEAD: Normocephalic; atraumatic  EYES: PERRL; EOM intact   ENMT: External appears normal  NECK: Supple; non-tender  CARD: Normal S1, S2; no murmurs, rubs, or gallops  RESP: Normal chest excursion with respiration; breath sounds clear and equal bilaterally  ABD: Soft, non-distended; non-tender  EXT: Limited ROM in shoulders and knees 2/2 pain; non-tender to palpation  SKIN: Warm, dry, no rash  NEURO:  No focal neurological deficiencies.

## 2023-10-07 NOTE — H&P ADULT - HISTORY OF PRESENT ILLNESS
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 Pt has history of similar pain caused by RA. Reports taking sulfasalazine and Plaquenil, tylenol for pain. No NSAID use d/t CKD. No recent steroid use d/t hyperglycemia. She was recently admitted to [facility] [place], operated by [facility] for the pain. She has been having an RA flare for the past 2 weeks, similar to prior flares but more severe, unable to get out of bed this morning. Dr. Douglass instructed her to come to ED for evaluation and treatment.    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 Pt has history of similar pain caused by RA. Reports taking sulfasalazine and Plaquenil, Tylenol for pain. No NSAID use d/t CKD. She was recently admitted to [facility] for the pain and her sulfasalazine dose was increased to 1000 mg BID. She has been having an RA flare for the past 2 weeks, similar to prior flares but more severe, unable to get out of bed this morning, and called Dr. Douglass who advised her to present to the ED. She reports starting chemotherapy at [facility] on 09/07 (unsure of agent) and was planned for 2nd session 3 weeks later, but fell after getting up from bed, prompting admission to [facility]. Denies SOB, cough, fever, chills, headache, vision changes, confusion.     ED   VS: T 98.4, HR 88, /93, RR 16, 02 97% on RA  Labs: WBC 11.5, Hgb 11.4, Glucose 164, Na 136, K 3.8,   Imaging: None   Interventions: Prednisone 60 mg, Ofirmev 1 g

## 2023-10-07 NOTE — H&P ADULT - PROBLEM SELECTOR PLAN 2
Dx with NSCLC with mets to brain on last admission, started CRT, [place] [facility] per pt, last RT completed on previous admission 07/2023    - Continue B12/folate supplementation   -Rad onc consult in AM for consideration of continued RT while in patient

## 2023-10-07 NOTE — ED ADULT NURSE NOTE - OBJECTIVE STATEMENT
Patient aaox4 c/o worsening RA to UE b/l. States has had RA for 10 years, did not take medication today but feels "it's been getting harder to get up from bed and do things for myself". Recently went to [facility]. Maintaining patent airway, denies sob/cp/dizziness/n/v/fever/chills. In patient gown.

## 2023-10-07 NOTE — H&P ADULT - NSHPPHYSICALEXAM_GEN_ALL_CORE
PHYSICAL EXAM:  Constitutional: NAD, comfortable in bed.  HEENT: NC/AT, PERRLA, EOMI, no conjunctival pallor or scleral icterus, MMM  Respiratory: CTA B/L. No w/r/r.   Cardiovascular: RRR, normal S1 and S2, no m/r/g.   Gastrointestinal: +BS, soft NTND, no guarding or rebound tenderness, no palpable masses   Extremities: Mild MCP swelling of L 2nd MCP,   Vascular: Pulses equal and strong throughout. Ulnar deviation noted on bilateral hands   Neurological: AAOx3, no CN deficits, strength and sensation intact throughout.   Skin: No gross skin abnormalities or rashes

## 2023-10-07 NOTE — ED PROVIDER NOTE - OBJECTIVE STATEMENT
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 Pt has history of similar pain caused by RA. She take sulfasalazine and Plaquenil, tylenol for pain. No NSAID use d/t CKD. No recent steroid use d/t hyperglycemia. She was recently admitted to [facility] for the pain. She has been having an RA flare for the past 2 weeks, similar to prior flares but more severe, unable to get out of bed this morning. Spoke to Dr. Douglass who instructed her to come to ED for evaluation and treatment.

## 2023-10-07 NOTE — H&P ADULT - PROBLEM SELECTOR PLAN 3
A1C results: 7.4 last admission 07/2023; endocrine consulted at that time deeming a finalized regimen of lispro 10 u before each meal; experienced nocturnal hypoglycemia and insulin regimen recently changed at [facility] given repeated hypoglycemia     -Continue lantus 5u qhs   -iSS

## 2023-10-07 NOTE — ED ADULT TRIAGE NOTE - ISOLATION TYPE:
[address] [place] [id number]  Dept: [phone]      EMTALA TRANSFER CONSENT    NAME Nora Aquino                                         1964                              MRN [id number]    I have been informed of my rights regarding examination, treatment, and transfer   by Dr. Amy Brownlee MD    Benefits: Specialized equipment and/or services available at the receiving facility (Include comment)________________________ (trauam)    Risks: Potential for delay in receiving treatment, Potential deterioration of medical condition, Loss of IV, Increased discomfort during transfer, Possible worsening of condition or death during transfer      Consent for Transfer:  I acknowledge that my medical condition has been evaluated and explained to me by the emergency department physician or other qualified medical person and/or my attending physician, who has recommended that I be transferred to the service of  Accepting Physician: Dr Danielle Hernandez at [address] [place] [address] [address] Name, [facility] [address] : [facility]. The above potential benefits of such transfer, the potential risks associated with such transfer, and the probable risks of not being transferred have been explained to me, and I fully understand them. The doctor has explained that, in my case, the benefits of transfer outweigh the risks. I agree to be transferred. I authorize the performance of emergency medical procedures and treatments upon me in both transit and upon arrival at the receiving facility. Additionally, I authorize the release of any and all medical records to the receiving facility and request they be transported with me, if possible. I understand that the safest mode of transportation during a medical emergency is an ambulance and that the Hospital advocates the use of this mode of transport.  Risks of traveling to the receiving facility by car, including absence of medical control, life sustaining equipment, such as oxygen, and medical personnel has been explained to me and I fully understand them. (RAJENDRA CORRECT BOX BELOW)  [  ]  I consent to the stated transfer and to be transported by ambulance/helicopter. [  ]  I consent to the stated transfer, but refuse transportation by ambulance and accept full responsibility for my transportation by car. I understand the risks of non-ambulance transfers and I exonerate the Hospital and its staff from any deterioration in my condition that results from this refusal.    X___________________________________________    DATE  10/04/23  TIME________  Signature of patient or legally responsible individual signing on patient behalf           RELATIONSHIP TO PATIENT_________________________          Provider Certification    NAME Anders Maradiaga                                        [facility] 1964                              MRN [id number]    A medical screening exam was performed on the above named patient. Based on the examination:    Condition Necessitating Transfer The primary encounter diagnosis was Palpitations. Diagnoses of Lightheadedness, History of DVT (deep vein thrombosis), and Subdural hematoma (HCC) were also pertinent to this visit.     Patient Condition: The patient has been stabilized such that within reasonable medical probability, no material deterioration of the patient condition or the condition of the unborn child(romain) is likely to result from the transfer    Reason for Transfer: Level of Care needed not available at this facility (trauma)    Transfer Requirements: [address] [place]   · Space available and qualified personnel available for treatment as acknowledged by    · Agreed to accept transfer and to provide appropriate medical treatment as acknowledged by       Dr Norris Son  · Appropriate medical records of the examination and treatment of the patient are provided at the time of transfer    [facility] Pool COMPLETED _______  · Transfer will be performed by qualified personnel from    and appropriate transfer equipment as required, including the use of necessary and appropriate life support measures. Provider Certification: I have examined the patient and explained the following risks and benefits of being transferred/refusing transfer to the patient/family:  General risk, such as traffic hazards, adverse weather conditions, rough terrain or turbulence, possible failure of equipment (including vehicle or aircraft), or consequences of actions of persons outside the control of the transport personnel, Risk of worsening condition, The possibility of a transport vehicle being unavailable, Unanticipated needs of medical equipment and personnel during transport      Based on these reasonable risks and benefits to the patient and/or the unborn child(romain), and based upon the information available at the time of the patient’s examination, I certify that the medical benefits reasonably to be expected from the provision of appropriate medical treatments at another medical facility outweigh the increasing risks, if any, to the individual’s medical condition, and in the case of labor to the unborn child, from effecting the transfer.     X____________________________________________ DATE 10/04/23        TIME_______      ORIGINAL - SEND TO MEDICAL RECORDS   COPY - SEND WITH PATIENT DURING TRANSFER None

## 2023-10-07 NOTE — ED ADULT NURSE REASSESSMENT NOTE - NS ED NURSE REASSESS COMMENT FT1
Pt flipped to RN Sonia holding. Pt breathing spont on RA, unlabored. NAD noted. FS to be completed before physically moving pt to ED. Will notify RN holding RN.

## 2023-10-07 NOTE — ED PROVIDER NOTE - TOBACCO USE
Never smoker Siliq Counseling:  I discussed with the patient the risks of Siliq including but not limited to new or worsening depression, suicidal thoughts and behavior, immunosuppression, malignancy, posterior leukoencephalopathy syndrome, and serious infections.  The patient understands that monitoring is required including a PPD at baseline and must alert us or the primary physician if symptoms of infection or other concerning signs are noted. There is also a special program designed to monitor depression which is required with Siliq.

## 2023-10-07 NOTE — H&P ADULT - PROBLEM SELECTOR PLAN 6
Plan:  F: s/p 1 L NS in the ED   E: replete K<4, Mg<2  N: regular  VTE Prophylaxis: lovenox  GI: none  C: Full Code  D: F

## 2023-10-07 NOTE — PATIENT PROFILE ADULT - FALL HARM RISK - HARM RISK INTERVENTIONS

## 2023-10-07 NOTE — ED ADULT NURSE NOTE - NSFALLRISKINTERV_ED_ALL_ED

## 2023-10-07 NOTE — ED PROVIDER NOTE - NS ED ROS FT
CONSTITUTIONAL: No fever, no chills, no fatigue  EYES: No eye redness, no visual changes  ENT: No ear pain, no sore throat  CARDIOVASCULAR: No chest pain, no palpitations  RESPIRATORY: No cough, no SOB  GI: No abdominal pain, no nausea, no vomiting, no constipation, no diarrhea  GENITOURINARY: No dysuria, no frequency, no hematuria  MUSCULOSKELETAL: No back pain, + diffuse joint pain, no myalgias  SKIN: No rash, no peripheral edema  NEURO: No headache, no confusion    ALL OTHER SYSTEMS NEGATIVE.

## 2023-10-07 NOTE — H&P ADULT - ASSESSMENT
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

## 2023-10-07 NOTE — ED ADULT NURSE REASSESSMENT NOTE - NS ED NURSE REASSESS COMMENT FT1
Report received from day shift RN. Pt lying comfortably on stretcher. NAD noted. Spont breathing on RA.

## 2023-10-07 NOTE — ED ADULT TRIAGE NOTE - CHIEF COMPLAINT QUOTE
Pt has hx of shoulder pain recently d/c from [facility] for bilateral shoulder pain. Pt was unable to take her meds this morning and is now complaining of increasing pain. Pt denies cardiac hx.

## 2023-10-07 NOTE — H&P ADULT - PROBLEM SELECTOR PLAN 1
Intermittent flares, now with b/l joint, arm, and shoulder pain c/b inability to get out of bed today 2/2 pain. Currently well controlled after steroids/tylenol. Home meds: sulfasalazine and plaquenil; currently on sulfasalzine 1000 mg BID (recently increased from 500 mg BID) and plaquenil 200 mg BID     -C/w home Sulfasalazine and Plaquenil  -S/p prednisone 60 mg QD in ED. Consider ongoing prednisone use in AM based on clinical response   -ESR/CRP  -Pain regimen  -PT/OT  -Follow-up with rheumatologist (Dr. Johnston) on discharge

## 2023-10-07 NOTE — ED ADULT NURSE NOTE - CHIEF COMPLAINT QUOTE
Pt has hx of shoulder pain recently d/c from [facility] for bilateral shoulder pain. Pt was unable to take her meds this morning and is now complaining of increasing pain. Pt denies cardiac hx.

## 2023-10-07 NOTE — PATIENT PROFILE ADULT - FUNCTIONAL ASSESSMENT - BASIC MOBILITY 6.
2-calculated by average/Not able to assess (calculate score using [facility] averaging method)

## 2023-10-07 NOTE — ED PROVIDER NOTE - CLINICAL SUMMARY MEDICAL DECISION MAKING FREE TEXT BOX
Rheumatoid arthritis flare, diffuse joint pain,  not being managed by outpatient medications.  Patient has history of hyperglycemia with prednisone use, spoke to Dr. Marissa Douglass regarding patient's care, recommends admission. Will start patient on prednisone and admit for blood sugar monitoring. Rheumatoid consult as inpatient.  We will give Tylenol in ED for pain control. Rheumatoid arthritis flare, diffuse joint pain, not being managed by outpatient medications.  Patient has history of hyperglycemia with prednisone use, spoke to Dr. Marissa Douglass regarding patient's care, recommends admission. Will start patient on prednisone and admit for blood sugar monitoring. Rheumatoid consult as inpatient.  We will give Tylenol in ED for pain control.

## 2023-10-08 DIAGNOSIS — N18.9 CHRONIC KIDNEY DISEASE, UNSPECIFIED: ICD-10-CM

## 2023-10-08 DIAGNOSIS — I10 ESSENTIAL (PRIMARY) HYPERTENSION: ICD-10-CM

## 2023-10-08 DIAGNOSIS — Z29.9 ENCOUNTER FOR PROPHYLACTIC MEASURES, UNSPECIFIED: ICD-10-CM

## 2023-10-08 DIAGNOSIS — N18.4 CHRONIC KIDNEY DISEASE, STAGE 4 (SEVERE): ICD-10-CM

## 2023-10-08 DIAGNOSIS — M06.9 RHEUMATOID ARTHRITIS, UNSPECIFIED: ICD-10-CM

## 2023-10-08 DIAGNOSIS — C34.90 MALIGNANT NEOPLASM OF UNSPECIFIED PART OF UNSPECIFIED BRONCHUS OR LUNG: ICD-10-CM

## 2023-10-08 DIAGNOSIS — E11.9 TYPE 2 DIABETES MELLITUS WITHOUT COMPLICATIONS: ICD-10-CM

## 2023-10-08 LAB
A1C WITH ESTIMATED AVERAGE GLUCOSE RESULT: 10 % — HIGH (ref 4–5.6)
ESTIMATED AVERAGE GLUCOSE: 240 MG/DL — HIGH (ref 68–114)
GLUCOSE BLDC GLUCOMTR-MCNC: 251 MG/DL — HIGH (ref 70–99)
GLUCOSE BLDC GLUCOMTR-MCNC: 284 MG/DL — HIGH (ref 70–99)
GLUCOSE BLDC GLUCOMTR-MCNC: 310 MG/DL — HIGH (ref 70–99)
GLUCOSE BLDC GLUCOMTR-MCNC: 374 MG/DL — HIGH (ref 70–99)

## 2023-10-08 PROCEDURE — 99221 1ST HOSP IP/OBS SF/LOW 40: CPT | Mod: GC

## 2023-10-08 RX ORDER — ASPIRIN/CALCIUM CARB/MAGNESIUM 324 MG
81 TABLET ORAL DAILY
Refills: 0 | Status: DISCONTINUED | OUTPATIENT
Start: 2023-10-08 | End: 2023-10-18

## 2023-10-08 RX ORDER — SULFASALAZINE 500 MG
1000 TABLET ORAL EVERY 12 HOURS
Refills: 0 | Status: DISCONTINUED | OUTPATIENT
Start: 2023-10-08 | End: 2023-10-11

## 2023-10-08 RX ORDER — METOPROLOL TARTRATE 50 MG
100 TABLET ORAL DAILY
Refills: 0 | Status: DISCONTINUED | OUTPATIENT
Start: 2023-10-08 | End: 2023-10-18

## 2023-10-08 RX ORDER — SPIRONOLACTONE 25 MG/1
1 TABLET, FILM COATED ORAL
Qty: 0 | Refills: 0 | DISCHARGE

## 2023-10-08 RX ORDER — ENOXAPARIN SODIUM 100 MG/ML
40 INJECTION SUBCUTANEOUS EVERY 24 HOURS
Refills: 0 | Status: DISCONTINUED | OUTPATIENT
Start: 2023-10-08 | End: 2023-10-18

## 2023-10-08 RX ORDER — PREGABALIN 225 MG/1
1000 CAPSULE ORAL DAILY
Refills: 0 | Status: DISCONTINUED | OUTPATIENT
Start: 2023-10-08 | End: 2023-10-18

## 2023-10-08 RX ORDER — INSULIN GLARGINE 100 [IU]/ML
5 INJECTION, SOLUTION SUBCUTANEOUS AT BEDTIME
Refills: 0 | Status: DISCONTINUED | OUTPATIENT
Start: 2023-10-08 | End: 2023-10-09

## 2023-10-08 RX ORDER — SULFASALAZINE 500 MG
1000 TABLET ORAL EVERY 12 HOURS
Refills: 0 | Status: DISCONTINUED | OUTPATIENT
Start: 2023-10-08 | End: 2023-10-08

## 2023-10-08 RX ORDER — ATORVASTATIN CALCIUM 80 MG/1
40 TABLET, FILM COATED ORAL AT BEDTIME
Refills: 0 | Status: DISCONTINUED | OUTPATIENT
Start: 2023-10-08 | End: 2023-10-18

## 2023-10-08 RX ORDER — HYDROXYCHLOROQUINE SULFATE 200 MG
200 TABLET ORAL
Refills: 0 | Status: DISCONTINUED | OUTPATIENT
Start: 2023-10-08 | End: 2023-10-18

## 2023-10-08 RX ORDER — AMLODIPINE BESYLATE 2.5 MG/1
10 TABLET ORAL EVERY 24 HOURS
Refills: 0 | Status: DISCONTINUED | OUTPATIENT
Start: 2023-10-08 | End: 2023-10-18

## 2023-10-08 RX ORDER — LOSARTAN POTASSIUM 100 MG/1
100 TABLET, FILM COATED ORAL EVERY 24 HOURS
Refills: 0 | Status: DISCONTINUED | OUTPATIENT
Start: 2023-10-08 | End: 2023-10-18

## 2023-10-08 RX ORDER — FOLIC ACID 0.8 MG
1 TABLET ORAL DAILY
Refills: 0 | Status: DISCONTINUED | OUTPATIENT
Start: 2023-10-08 | End: 2023-10-18

## 2023-10-08 RX ADMIN — INSULIN GLARGINE 5 UNIT(S): 100 INJECTION, SOLUTION SUBCUTANEOUS at 22:10

## 2023-10-08 RX ADMIN — Medication 81 MILLIGRAM(S): at 11:59

## 2023-10-08 RX ADMIN — ATORVASTATIN CALCIUM 40 MILLIGRAM(S): 80 TABLET, FILM COATED ORAL at 22:10

## 2023-10-08 RX ADMIN — Medication 20 MILLIGRAM(S): at 11:59

## 2023-10-08 RX ADMIN — AMLODIPINE BESYLATE 10 MILLIGRAM(S): 2.5 TABLET ORAL at 00:38

## 2023-10-08 RX ADMIN — Medication 3: at 13:39

## 2023-10-08 RX ADMIN — Medication 1000 MILLIGRAM(S): at 22:35

## 2023-10-08 RX ADMIN — Medication 200 MILLIGRAM(S): at 07:30

## 2023-10-08 RX ADMIN — Medication 1 MILLIGRAM(S): at 11:59

## 2023-10-08 RX ADMIN — ENOXAPARIN SODIUM 40 MILLIGRAM(S): 100 INJECTION SUBCUTANEOUS at 22:11

## 2023-10-08 RX ADMIN — Medication 200 MILLIGRAM(S): at 18:18

## 2023-10-08 RX ADMIN — Medication 4: at 18:19

## 2023-10-08 RX ADMIN — Medication 100 MILLIGRAM(S): at 09:55

## 2023-10-08 RX ADMIN — Medication 3: at 09:56

## 2023-10-08 RX ADMIN — Medication 5: at 22:10

## 2023-10-08 RX ADMIN — LOSARTAN POTASSIUM 100 MILLIGRAM(S): 100 TABLET, FILM COATED ORAL at 00:38

## 2023-10-08 RX ADMIN — PREGABALIN 1000 MICROGRAM(S): 225 CAPSULE ORAL at 11:59

## 2023-10-08 RX ADMIN — Medication 1000 MILLIGRAM(S): at 10:13

## 2023-10-08 NOTE — PROGRESS NOTE ADULT - ASSESSMENT
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

## 2023-10-08 NOTE — PROGRESS NOTE ADULT - PROBLEM SELECTOR PLAN 2
Dx with NSCLC with mets to brain on last admission, started CRT, [place] [facility] per pt, last RT completed on previous admission 07/2023    - Continue B12/folate supplementation   - Rad onc consult Monday for consideration of continued RT while in patient

## 2023-10-08 NOTE — PROGRESS NOTE ADULT - PROBLEM SELECTOR PLAN 3
A1C results: 7.4 last admission 07/2023; endocrine consulted at that time deeming a finalized regimen of lispro 10 u before each meal; experienced nocturnal hypoglycemia and insulin regimen recently changed at [facility] given repeated hypoglycemia     -Continue lantus 5u qhs   -iSS

## 2023-10-08 NOTE — PROGRESS NOTE ADULT - PROBLEM SELECTOR PLAN 1
Intermittent flares, now with b/l joint, arm, and shoulder pain c/b inability to get out of bed today 2/2 pain. Currently well controlled after steroids/tylenol. Home meds: sulfasalazine and plaquenil; currently on sulfasalzine 1000 mg BID (recently increased from 500 mg BID) and plaquenil 200 mg BID     -C/w home Sulfasalazine and Plaquenil  -S/p prednisone 60 mg QD in ED; C/w 5 days 20 mg prednisone  -F/u ESR/CRP  -Pain regimen: tylenol   -PT/OT  -Follow-up with rheumatologist (Dr. Johnston) on discharge

## 2023-10-08 NOTE — PHYSICAL THERAPY INITIAL EVALUATION ADULT - GENERAL OBSERVATIONS, REHAB EVAL
Pt received semi supine in bed +hep lock. SOSA Parry aware of intent t otreat. Pt tolerated session well amb with RW supervision no LOB noted. Pt was left as received with call bell in reach, VSS, and in NAD.

## 2023-10-08 NOTE — PROGRESS NOTE ADULT - SUBJECTIVE AND OBJECTIVE BOX
INTERVAL HPI/OVERNIGHT EVENTS  Events well known to me;   Recent diagnosis of Lung Cancer; Received one cycle of Chemo at [place] ; Also needs complete RT      MEDICATIONS  (STANDING):  amLODIPine   Tablet 10 milliGRAM(s) Oral every 24 hours  aspirin  chewable 81 milliGRAM(s) Oral daily  atorvastatin 40 milliGRAM(s) Oral at bedtime  cyanocobalamin 1000 MICROGram(s) Oral daily  dextrose 5%. 1000 milliLiter(s) (100 mL/Hr) IV Continuous <Continuous>  dextrose 5%. 1000 milliLiter(s) (50 mL/Hr) IV Continuous <Continuous>  dextrose 50% Injectable 25 Gram(s) IV Push once  dextrose 50% Injectable 12.5 Gram(s) IV Push once  dextrose 50% Injectable 25 Gram(s) IV Push once  enoxaparin Injectable 40 milliGRAM(s) SubCutaneous every 24 hours  folic acid 1 milliGRAM(s) Oral daily  glucagon  Injectable 1 milliGRAM(s) IntraMuscular once  hydroxychloroquine 200 milliGRAM(s) Oral two times a day  influenza  Vaccine (HIGH DOSE) 0.7 milliLiter(s) IntraMuscular once  insulin glargine Injectable (LANTUS) 5 Unit(s) SubCutaneous at bedtime  insulin lispro (ADMELOG) corrective regimen sliding scale   SubCutaneous Before meals and at bedtime  losartan 100 milliGRAM(s) Oral every 24 hours  metoprolol succinate  milliGRAM(s) Oral daily  predniSONE   Tablet 20 milliGRAM(s) Oral once  sulfaSALAzine 1000 milliGRAM(s) Oral every 12 hours    MEDICATIONS  (PRN):  acetaminophen     Tablet .. 650 milliGRAM(s) Oral every 6 hours PRN Temp greater or equal to 38C (100.4F), Mild Pain (1 - 3)  aluminum hydroxide/magnesium hydroxide/simethicone Suspension 30 milliLiter(s) Oral every 6 hours PRN Dyspepsia  dextrose Oral Gel 15 Gram(s) Oral once PRN Blood Glucose LESS THAN 70 milliGRAM(s)/deciliter  melatonin 3 milliGRAM(s) Oral at bedtime PRN Insomnia      Allergies    Bactrim (Other)    Intolerances        Vital Signs Last 24 Hrs  T(C): 36.3 (08 Oct 2023 09:44), Max: 37 (07 Oct 2023 23:45)  T(F): 97.4 (08 Oct 2023 09:44), Max: 98.6 (07 Oct 2023 23:45)  HR: 91 (08 Oct 2023 09:44) (86 - 91)  BP: 143/74 (08 Oct 2023 09:44) (132/67 - 171/93)  BP(mean): 97 (08 Oct 2023 09:44) (97 - 97)  RR: 18 (08 Oct 2023 09:44) (16 - 18)  SpO2: 97% (08 Oct 2023 09:44) (96% - 97%)    Parameters below as of 08 Oct 2023 09:44  Patient On (Oxygen Delivery Method): room air              Constitutional: Awake     Eyes: NEDRA    ENMT: Negative    Neck: Supple    Back:  no tenderness     Respiratory:  clear     Cardiovascular: S1 S2    Gastrointestinal: soft       Genitourinary:    Extremities: no edema     Vascular:    Neurological:  strength  improved     Skin:    Lymph Nodes:            10-07 @ 07:01  -  10-08 @ 07:00  --------------------------------------------------------  IN: 0 mL / OUT: 450 mL / NET: -450 mL      LABS:                        11.4   11.54 )-----------( 234      ( 07 Oct 2023 20:01 )             34.1     10-07    136  |  102  |  14  ----------------------------<  122<H>  3.8   |  20<L>  |  1.01    Ca    9.4      07 Oct 2023 20:01        Urinalysis Basic - ( 07 Oct 2023 20:01 )    Color: x / Appearance: x / SG: x / pH: x  Gluc: 122 mg/dL / Ketone: x  / Bili: x / Urobili: x   Blood: x / Protein: x / Nitrite: x   Leuk Esterase: x / RBC: x / WBC x   Sq Epi: x / Non Sq Epi: x / Bacteria: x        RADIOLOGY & ADDITIONAL TESTS:

## 2023-10-08 NOTE — PROGRESS NOTE ADULT - SUBJECTIVE AND OBJECTIVE BOX
Internal Medicine Progress Note  Marylin Charlton, PGY-1  Pager: [phone]    ******INCOMPLETE******    Patient is a 73y old  Female who presents with a chief complaint of RA flare (07 Oct 2023 22:57)    OVERNIGHT EVENTS/INTERVAL HPI:    REVIEW OF SYSTEMS:  All other review of systems is negative unless indicated above.    OBJECTIVE:  T(C): 36.9 (10-08-23 @ 06:23), Max: 37 (10-07-23 @ 23:45)  HR: 88 (10-08-23 @ 06:23) (86 - 88)  BP: 138/82 (10-08-23 @ 06:23) (132/67 - 171/93)  RR: 18 (10-08-23 @ 06:23) (16 - 18)  SpO2: 96% (10-08-23 @ 06:23) (96% - 97%)  Daily     Daily     Physical Exam:  General: in no acute distress  Eyes: EOMI intact bilaterally. Anicteric sclerae, moist conjunctivae  HENT: Moist mucous membranes  Neck: Trachea midline, supple  Lungs: CTA B/L. No wheezes, rales, or rhonchi  Cardiovascular: RRR. No murmurs, rubs, or gallops  Abdomen: Soft, non-tender non-distended; No rebound or guarding  Extremities: WWP, No clubbing, cyanosis or edema  MSK: No midline bony tenderness. No CVA tenderness bilaterally  Neurological: Alert and oriented x3  Skin: Warm and dry. No obvious rash     Medications:  MEDICATIONS  (STANDING):  amLODIPine   Tablet 10 milliGRAM(s) Oral every 24 hours  aspirin  chewable 81 milliGRAM(s) Oral daily  atorvastatin 40 milliGRAM(s) Oral at bedtime  cyanocobalamin 1000 MICROGram(s) Oral daily  dextrose 5%. 1000 milliLiter(s) (50 mL/Hr) IV Continuous <Continuous>  dextrose 5%. 1000 milliLiter(s) (100 mL/Hr) IV Continuous <Continuous>  dextrose 50% Injectable 25 Gram(s) IV Push once  dextrose 50% Injectable 12.5 Gram(s) IV Push once  dextrose 50% Injectable 25 Gram(s) IV Push once  enoxaparin Injectable 40 milliGRAM(s) SubCutaneous every 24 hours  folic acid 1 milliGRAM(s) Oral daily  glucagon  Injectable 1 milliGRAM(s) IntraMuscular once  hydroxychloroquine 200 milliGRAM(s) Oral two times a day  influenza  Vaccine (HIGH DOSE) 0.7 milliLiter(s) IntraMuscular once  insulin glargine Injectable (LANTUS) 5 Unit(s) SubCutaneous at bedtime  insulin lispro (ADMELOG) corrective regimen sliding scale   SubCutaneous Before meals and at bedtime  losartan 100 milliGRAM(s) Oral every 24 hours  metoprolol succinate  milliGRAM(s) Oral daily  sulfaSALAzine 1000 milliGRAM(s) Oral every 12 hours    MEDICATIONS  (PRN):  acetaminophen     Tablet .. 650 milliGRAM(s) Oral every 6 hours PRN Temp greater or equal to 38C (100.4F), Mild Pain (1 - 3)  aluminum hydroxide/magnesium hydroxide/simethicone Suspension 30 milliLiter(s) Oral every 6 hours PRN Dyspepsia  dextrose Oral Gel 15 Gram(s) Oral once PRN Blood Glucose LESS THAN 70 milliGRAM(s)/deciliter  melatonin 3 milliGRAM(s) Oral at bedtime PRN Insomnia      Labs:                        11.4   11.54 )-----------( 234      ( 07 Oct 2023 20:01 )             34.1     10-07    136  |  102  |  14  ----------------------------<  122<H>  3.8   |  20<L>  |  1.01    Ca    9.4      07 Oct 2023 20:01        Urinalysis Basic - ( 07 Oct 2023 20:01 )    Color: x / Appearance: x / SG: x / pH: x  Gluc: 122 mg/dL / Ketone: x  / Bili: x / Urobili: x   Blood: x / Protein: x / Nitrite: x   Leuk Esterase: x / RBC: x / WBC x   Sq Epi: x / Non Sq Epi: x / Bacteria: x      COVID-19 PCR: NotDetec (21 Jul 2023 19:49)  COVID-19 PCR: NotDetec (13 Jul 2023 05:30)      Radiology: Reviewed Patient is a 73y old  Female who presents with a chief complaint of RA flare (07 Oct 2023 22:57)    OVERNIGHT EVENTS/INTERVAL HPI: No acute events overnight. Pt states that her pain has resolved. Denies chest pain, SOB, abdominal pain, dysuria and URI symptoms.    REVIEW OF SYSTEMS:  All other review of systems is negative unless indicated above.    OBJECTIVE:  T(C): 36.9 (10-08-23 @ 06:23), Max: 37 (10-07-23 @ 23:45)  HR: 88 (10-08-23 @ 06:23) (86 - 88)  BP: 138/82 (10-08-23 @ 06:23) (132/67 - 171/93)  RR: 18 (10-08-23 @ 06:23) (16 - 18)  SpO2: 96% (10-08-23 @ 06:23) (96% - 97%)  Daily     Daily     Physical Exam:  General: in no acute distress  Eyes: EOMI intact bilaterally. Anicteric sclerae, moist conjunctivae  HENT: Moist mucous membranes  Neck: Trachea midline, supple  Lungs: CTA B/L. No wheezes, rales, or rhonchi  Cardiovascular: RRR. No murmurs, rubs, or gallops  Abdomen: Soft, non-tender non-distended; No rebound or guarding  Extremities: Mild swelling of left 2nd MCP; ulnar deviation noted b/l  MSK: No midline bony tenderness. No CVA tenderness bilaterally  Neurological: Alert and oriented x3  Skin: WWP.    Medications:  MEDICATIONS  (STANDING):  amLODIPine   Tablet 10 milliGRAM(s) Oral every 24 hours  aspirin  chewable 81 milliGRAM(s) Oral daily  atorvastatin 40 milliGRAM(s) Oral at bedtime  cyanocobalamin 1000 MICROGram(s) Oral daily  dextrose 5%. 1000 milliLiter(s) (50 mL/Hr) IV Continuous <Continuous>  dextrose 5%. 1000 milliLiter(s) (100 mL/Hr) IV Continuous <Continuous>  dextrose 50% Injectable 25 Gram(s) IV Push once  dextrose 50% Injectable 12.5 Gram(s) IV Push once  dextrose 50% Injectable 25 Gram(s) IV Push once  enoxaparin Injectable 40 milliGRAM(s) SubCutaneous every 24 hours  folic acid 1 milliGRAM(s) Oral daily  glucagon  Injectable 1 milliGRAM(s) IntraMuscular once  hydroxychloroquine 200 milliGRAM(s) Oral two times a day  influenza  Vaccine (HIGH DOSE) 0.7 milliLiter(s) IntraMuscular once  insulin glargine Injectable (LANTUS) 5 Unit(s) SubCutaneous at bedtime  insulin lispro (ADMELOG) corrective regimen sliding scale   SubCutaneous Before meals and at bedtime  losartan 100 milliGRAM(s) Oral every 24 hours  metoprolol succinate  milliGRAM(s) Oral daily  sulfaSALAzine 1000 milliGRAM(s) Oral every 12 hours    MEDICATIONS  (PRN):  acetaminophen     Tablet .. 650 milliGRAM(s) Oral every 6 hours PRN Temp greater or equal to 38C (100.4F), Mild Pain (1 - 3)  aluminum hydroxide/magnesium hydroxide/simethicone Suspension 30 milliLiter(s) Oral every 6 hours PRN Dyspepsia  dextrose Oral Gel 15 Gram(s) Oral once PRN Blood Glucose LESS THAN 70 milliGRAM(s)/deciliter  melatonin 3 milliGRAM(s) Oral at bedtime PRN Insomnia      Labs:                        11.4   11.54 )-----------( 234      ( 07 Oct 2023 20:01 )             34.1     10-07    136  |  102  |  14  ----------------------------<  122<H>  3.8   |  20<L>  |  1.01    Ca    9.4      07 Oct 2023 20:01        Urinalysis Basic - ( 07 Oct 2023 20:01 )    Color: x / Appearance: x / SG: x / pH: x  Gluc: 122 mg/dL / Ketone: x  / Bili: x / Urobili: x   Blood: x / Protein: x / Nitrite: x   Leuk Esterase: x / RBC: x / WBC x   Sq Epi: x / Non Sq Epi: x / Bacteria: x      COVID-19 PCR: NotDetec (21 Jul 2023 19:49)  COVID-19 PCR: NotDetec (13 Jul 2023 05:30)      Radiology: Reviewed Patient is a 73y old  Female who presents with a chief complaint of RA flare (07 Oct 2023 22:57)    OVERNIGHT EVENTS/INTERVAL HPI: No acute events overnight. Pt states that her pain has resolved. Denies chest pain, SOB, abdominal pain, dysuria and URI symptoms.    REVIEW OF SYSTEMS:  All other review of systems is negative unless indicated above.    OBJECTIVE:  T(C): 36.9 (10-08-23 @ 06:23), Max: 37 (10-07-23 @ 23:45)  HR: 88 (10-08-23 @ 06:23) (86 - 88)  BP: 138/82 (10-08-23 @ 06:23) (132/67 - 171/93)  RR: 18 (10-08-23 @ 06:23) (16 - 18)  SpO2: 96% (10-08-23 @ 06:23) (96% - 97%)  Daily     Daily     Physical Exam:  General: in no acute distress  Eyes: EOMI intact bilaterally. Anicteric sclerae, moist conjunctivae  HENT: Moist mucous membranes  Neck: Trachea midline, supple  Lungs: CTA B/L. No wheezes, rales, or rhonchi  Cardiovascular: RRR. No murmurs, rubs, or gallops  Abdomen: Soft, non-tender non-distended; No rebound or guarding  Extremities: Mild swelling of left 2nd MCP; ulnar deviation noted b/l  MSK: No midline bony tenderness. No CVA tenderness bilaterally  Neurological: Alert and oriented x3  Skin: WWP. Port in place on chest. Incision c/d/i.    Medications:  MEDICATIONS  (STANDING):  amLODIPine   Tablet 10 milliGRAM(s) Oral every 24 hours  aspirin  chewable 81 milliGRAM(s) Oral daily  atorvastatin 40 milliGRAM(s) Oral at bedtime  cyanocobalamin 1000 MICROGram(s) Oral daily  dextrose 5%. 1000 milliLiter(s) (50 mL/Hr) IV Continuous <Continuous>  dextrose 5%. 1000 milliLiter(s) (100 mL/Hr) IV Continuous <Continuous>  dextrose 50% Injectable 25 Gram(s) IV Push once  dextrose 50% Injectable 12.5 Gram(s) IV Push once  dextrose 50% Injectable 25 Gram(s) IV Push once  enoxaparin Injectable 40 milliGRAM(s) SubCutaneous every 24 hours  folic acid 1 milliGRAM(s) Oral daily  glucagon  Injectable 1 milliGRAM(s) IntraMuscular once  hydroxychloroquine 200 milliGRAM(s) Oral two times a day  influenza  Vaccine (HIGH DOSE) 0.7 milliLiter(s) IntraMuscular once  insulin glargine Injectable (LANTUS) 5 Unit(s) SubCutaneous at bedtime  insulin lispro (ADMELOG) corrective regimen sliding scale   SubCutaneous Before meals and at bedtime  losartan 100 milliGRAM(s) Oral every 24 hours  metoprolol succinate  milliGRAM(s) Oral daily  sulfaSALAzine 1000 milliGRAM(s) Oral every 12 hours    MEDICATIONS  (PRN):  acetaminophen     Tablet .. 650 milliGRAM(s) Oral every 6 hours PRN Temp greater or equal to 38C (100.4F), Mild Pain (1 - 3)  aluminum hydroxide/magnesium hydroxide/simethicone Suspension 30 milliLiter(s) Oral every 6 hours PRN Dyspepsia  dextrose Oral Gel 15 Gram(s) Oral once PRN Blood Glucose LESS THAN 70 milliGRAM(s)/deciliter  melatonin 3 milliGRAM(s) Oral at bedtime PRN Insomnia      Labs:                        11.4   11.54 )-----------( 234      ( 07 Oct 2023 20:01 )             34.1     10-07    136  |  102  |  14  ----------------------------<  122<H>  3.8   |  20<L>  |  1.01    Ca    9.4      07 Oct 2023 20:01        Urinalysis Basic - ( 07 Oct 2023 20:01 )    Color: x / Appearance: x / SG: x / pH: x  Gluc: 122 mg/dL / Ketone: x  / Bili: x / Urobili: x   Blood: x / Protein: x / Nitrite: x   Leuk Esterase: x / RBC: x / WBC x   Sq Epi: x / Non Sq Epi: x / Bacteria: x      COVID-19 PCR: NotDetec (21 Jul 2023 19:49)  COVID-19 PCR: NotDetec (13 Jul 2023 05:30)      Radiology: Reviewed

## 2023-10-08 NOTE — PHYSICAL THERAPY INITIAL EVALUATION ADULT - PERTINENT HX OF CURRENT PROBLEM, REHAB EVAL
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

## 2023-10-08 NOTE — PHYSICAL THERAPY INITIAL EVALUATION ADULT - ADDITIONAL COMMENTS
Pt states she lives alone in elevator building. Pt uses rollator for amb and was independent with ADLs PTA. Pt has had multiple falls over the last several months due to "positional hypotension."

## 2023-10-09 ENCOUNTER — TRANSCRIPTION ENCOUNTER (OUTPATIENT)
Age: 73
End: 2023-10-09

## 2023-10-09 LAB
ALBUMIN SERPL ELPH-MCNC: 2.7 G/DL — LOW (ref 3.3–5)
ALP SERPL-CCNC: 109 U/L — SIGNIFICANT CHANGE UP (ref 40–120)
ALT FLD-CCNC: 9 U/L — LOW (ref 10–45)
ANION GAP SERPL CALC-SCNC: 10 MMOL/L — SIGNIFICANT CHANGE UP (ref 5–17)
AST SERPL-CCNC: 18 U/L — SIGNIFICANT CHANGE UP (ref 10–40)
BASOPHILS # BLD AUTO: 0.08 K/UL — SIGNIFICANT CHANGE UP (ref 0–0.2)
BASOPHILS NFR BLD AUTO: 0.4 % — SIGNIFICANT CHANGE UP (ref 0–2)
BILIRUB SERPL-MCNC: 0.2 MG/DL — SIGNIFICANT CHANGE UP (ref 0.2–1.2)
BUN SERPL-MCNC: 34 MG/DL — HIGH (ref 7–23)
CALCIUM SERPL-MCNC: 8.6 MG/DL — SIGNIFICANT CHANGE UP (ref 8.4–10.5)
CHLORIDE SERPL-SCNC: 101 MMOL/L — SIGNIFICANT CHANGE UP (ref 96–108)
CO2 SERPL-SCNC: 21 MMOL/L — LOW (ref 22–31)
CREAT SERPL-MCNC: 1.14 MG/DL — SIGNIFICANT CHANGE UP (ref 0.5–1.3)
CRP SERPL-MCNC: 13.6 MG/L — HIGH (ref 0–4)
EGFR: 51 ML/MIN/1.73M2 — LOW
EOSINOPHIL # BLD AUTO: 0.04 K/UL — SIGNIFICANT CHANGE UP (ref 0–0.5)
EOSINOPHIL NFR BLD AUTO: 0.2 % — SIGNIFICANT CHANGE UP (ref 0–6)
ERYTHROCYTE [SEDIMENTATION RATE] IN BLOOD: 55 MM/HR — HIGH
GLUCOSE BLDC GLUCOMTR-MCNC: 244 MG/DL — HIGH (ref 70–99)
GLUCOSE BLDC GLUCOMTR-MCNC: 246 MG/DL — HIGH (ref 70–99)
GLUCOSE BLDC GLUCOMTR-MCNC: 300 MG/DL — HIGH (ref 70–99)
GLUCOSE BLDC GLUCOMTR-MCNC: 300 MG/DL — HIGH (ref 70–99)
GLUCOSE BLDC GLUCOMTR-MCNC: 339 MG/DL — HIGH (ref 70–99)
GLUCOSE SERPL-MCNC: 271 MG/DL — HIGH (ref 70–99)
HCT VFR BLD CALC: 33.6 % — LOW (ref 34.5–45)
HGB BLD-MCNC: 10.9 G/DL — LOW (ref 11.5–15.5)
IMM GRANULOCYTES NFR BLD AUTO: 1.1 % — HIGH (ref 0–0.9)
LYMPHOCYTES # BLD AUTO: 1.79 K/UL — SIGNIFICANT CHANGE UP (ref 1–3.3)
LYMPHOCYTES # BLD AUTO: 9.1 % — LOW (ref 13–44)
MAGNESIUM SERPL-MCNC: 1.8 MG/DL — SIGNIFICANT CHANGE UP (ref 1.6–2.6)
MCHC RBC-ENTMCNC: 29.2 PG — SIGNIFICANT CHANGE UP (ref 27–34)
MCHC RBC-ENTMCNC: 32.4 GM/DL — SIGNIFICANT CHANGE UP (ref 32–36)
MCV RBC AUTO: 90.1 FL — SIGNIFICANT CHANGE UP (ref 80–100)
MONOCYTES # BLD AUTO: 1.16 K/UL — HIGH (ref 0–0.9)
MONOCYTES NFR BLD AUTO: 5.9 % — SIGNIFICANT CHANGE UP (ref 2–14)
NEUTROPHILS # BLD AUTO: 16.44 K/UL — HIGH (ref 1.8–7.4)
NEUTROPHILS NFR BLD AUTO: 83.3 % — HIGH (ref 43–77)
NRBC # BLD: 0 /100 WBCS — SIGNIFICANT CHANGE UP (ref 0–0)
PHOSPHATE SERPL-MCNC: 2.2 MG/DL — LOW (ref 2.5–4.5)
PLATELET # BLD AUTO: 271 K/UL — SIGNIFICANT CHANGE UP (ref 150–400)
POTASSIUM SERPL-MCNC: 3.8 MMOL/L — SIGNIFICANT CHANGE UP (ref 3.5–5.3)
POTASSIUM SERPL-SCNC: 3.8 MMOL/L — SIGNIFICANT CHANGE UP (ref 3.5–5.3)
PROT SERPL-MCNC: 6.3 G/DL — SIGNIFICANT CHANGE UP (ref 6–8.3)
RBC # BLD: 3.73 M/UL — LOW (ref 3.8–5.2)
RBC # FLD: 14.2 % — SIGNIFICANT CHANGE UP (ref 10.3–14.5)
SODIUM SERPL-SCNC: 132 MMOL/L — LOW (ref 135–145)
WBC # BLD: 19.72 K/UL — HIGH (ref 3.8–10.5)
WBC # FLD AUTO: 19.72 K/UL — HIGH (ref 3.8–10.5)

## 2023-10-09 PROCEDURE — 99232 SBSQ HOSP IP/OBS MODERATE 35: CPT | Mod: GC

## 2023-10-09 RX ORDER — INSULIN LISPRO 100/ML
5 VIAL (ML) SUBCUTANEOUS
Refills: 0 | Status: DISCONTINUED | OUTPATIENT
Start: 2023-10-09 | End: 2023-10-10

## 2023-10-09 RX ORDER — INSULIN GLARGINE 100 [IU]/ML
7 INJECTION, SOLUTION SUBCUTANEOUS AT BEDTIME
Refills: 0 | Status: DISCONTINUED | OUTPATIENT
Start: 2023-10-09 | End: 2023-10-10

## 2023-10-09 RX ORDER — POTASSIUM PHOSPHATE, MONOBASIC POTASSIUM PHOSPHATE, DIBASIC 236; 224 MG/ML; MG/ML
15 INJECTION, SOLUTION INTRAVENOUS ONCE
Refills: 0 | Status: COMPLETED | OUTPATIENT
Start: 2023-10-09 | End: 2023-10-09

## 2023-10-09 RX ORDER — INSULIN LISPRO 100/ML
5 VIAL (ML) SUBCUTANEOUS ONCE
Refills: 0 | Status: COMPLETED | OUTPATIENT
Start: 2023-10-09 | End: 2023-10-09

## 2023-10-09 RX ORDER — DEXTROSE 50 % IN WATER 50 %
25 SYRINGE (ML) INTRAVENOUS ONCE
Refills: 0 | Status: DISCONTINUED | OUTPATIENT
Start: 2023-10-09 | End: 2023-10-18

## 2023-10-09 RX ADMIN — Medication 5 UNIT(S): at 13:23

## 2023-10-09 RX ADMIN — Medication 650 MILLIGRAM(S): at 01:14

## 2023-10-09 RX ADMIN — ATORVASTATIN CALCIUM 40 MILLIGRAM(S): 80 TABLET, FILM COATED ORAL at 21:58

## 2023-10-09 RX ADMIN — ENOXAPARIN SODIUM 40 MILLIGRAM(S): 100 INJECTION SUBCUTANEOUS at 21:58

## 2023-10-09 RX ADMIN — Medication 1000 MILLIGRAM(S): at 12:07

## 2023-10-09 RX ADMIN — Medication 5 UNIT(S): at 09:27

## 2023-10-09 RX ADMIN — PREGABALIN 1000 MICROGRAM(S): 225 CAPSULE ORAL at 12:04

## 2023-10-09 RX ADMIN — Medication 2: at 09:26

## 2023-10-09 RX ADMIN — Medication 2: at 21:57

## 2023-10-09 RX ADMIN — Medication 1000 MILLIGRAM(S): at 22:22

## 2023-10-09 RX ADMIN — Medication 4: at 13:23

## 2023-10-09 RX ADMIN — Medication 200 MILLIGRAM(S): at 06:30

## 2023-10-09 RX ADMIN — AMLODIPINE BESYLATE 10 MILLIGRAM(S): 2.5 TABLET ORAL at 00:13

## 2023-10-09 RX ADMIN — LOSARTAN POTASSIUM 100 MILLIGRAM(S): 100 TABLET, FILM COATED ORAL at 00:13

## 2023-10-09 RX ADMIN — Medication 20 MILLIGRAM(S): at 09:27

## 2023-10-09 RX ADMIN — Medication 5 UNIT(S): at 17:57

## 2023-10-09 RX ADMIN — Medication 650 MILLIGRAM(S): at 00:14

## 2023-10-09 RX ADMIN — Medication 1 MILLIGRAM(S): at 12:04

## 2023-10-09 RX ADMIN — Medication 81 MILLIGRAM(S): at 12:05

## 2023-10-09 RX ADMIN — INSULIN GLARGINE 7 UNIT(S): 100 INJECTION, SOLUTION SUBCUTANEOUS at 21:56

## 2023-10-09 RX ADMIN — Medication 100 MILLIGRAM(S): at 06:31

## 2023-10-09 NOTE — OCCUPATIONAL THERAPY INITIAL EVALUATION ADULT - ADDITIONAL COMMENTS
Pt L handed and does not wear glasses. Pt lives alone in elevator accessible apartment with ramp to enter. Pt performs functional mobility in home and community with rollator.

## 2023-10-09 NOTE — DISCHARGE NOTE PROVIDER - NSDCFUADDAPPT_GEN_ALL_CORE_FT
Please bring your Insurance card, Photo ID and Discharge paperwork to the following appointment:    (1) Please follow up with your Hematology/Oncology Provider, Dr. Cathie Jeffrey at [address] [place] [address] on 10/18/2023 at 3:15pm.    Appointment was scheduled by Ms. CHUCK Spain, Referral Coordinator.   Please bring your Insurance card, Photo ID and Discharge paperwork to the following appointments:    (1) Please follow up with your Hematology/Oncology Provider, Dr. Cathie Jeffrey at [address] [place] [address] on 10/18/2023 at 3:15pm.    Appointment was scheduled by Ms. CHUCK Spain, Referral Coordinator.    (2) Please follow up with your Primary Care Provider, Dr. Marissa Douglass at [address] [place] [address] on 11/01/2023 at 2:00pm.    Appointment was scheduled by Ms. CHUCK Spain, Referral Coordinator.

## 2023-10-09 NOTE — PROGRESS NOTE ADULT - PROBLEM SELECTOR PLAN 2
Dx with NSCLC with mets to brain on last admission, started CRT, last chemo being 09/07 at [facility], unknown agent per pt, last RT completed on previous admission 07/2023    - Continue B12/folate supplementation   - Pt s/p 3 radiation treatments; no need for rad onc consult Dx with NSCLC with mets to brain on last admission, started CRT, last chemo being 09/07 at [facility] [place], unknown agent per pt, last RT completed on previous admission 07/2023    - Continue B12/folate supplementation   - Pt s/p 3 radiation treatments; no need for rad onc consult  - Spoke w/ outpatient physician Dr. Cathie Jeffrey ([phone]) stated pt is s/p one cycle of pembrolizumab in mid September and has been in and out of the hospital since. Believes the flares may be due to the chemo and will reassess the plan at her next appt.

## 2023-10-09 NOTE — DIETITIAN INITIAL EVALUATION ADULT - PERSON TAUGHT/METHOD
consistent CHO diet, increased nutrient needs in view of ca dx, supplements between meals/verbal instruction/patient instructed

## 2023-10-09 NOTE — DISCHARGE NOTE PROVIDER - NSDCCPTREATMENT_GEN_ALL_CORE_FT
----- Message from Sergio Cosby MD sent at 3/19/2019  1:20 PM CDT -----  No acute changes to her abdominal U/S.  We already knew about the cirrhosis.  Would have her follow up with GI.   PRINCIPAL PROCEDURE  Procedure: MRI  Findings and Treatment:   IMPRESSION:  Since 7/11/2023, right posterior frontal enhancing lesion and vasogenic   edema are completely resolved as a favorable response to therapy. No new   enhancing lesion.

## 2023-10-09 NOTE — DISCHARGE NOTE PROVIDER - NSDCCAREPROVSEEN_GEN_ALL_CORE_FT
[facility] on [address] [facility] along with these meds. Marissa Douglass

## 2023-10-09 NOTE — PROGRESS NOTE ADULT - PROBLEM SELECTOR PLAN 1
Intermittent flares, now with b/l joint, arm, and shoulder pain c/b inability to get out of bed today 2/2 pain. Currently well controlled after steroids/tylenol. Home meds: sulfasalazine and plaquenil; currently on sulfasalzine 1000 mg BID (recently increased from 500 mg BID) and plaquenil 200 mg BID   ESR 55, CRP 13.6  - C/w home Sulfasalazine and Plaquenil  - C/w 5 days 20 mg prednisone  - F/u ESR/CRP  - Pain regimen: tylenol   - PT/OT  - Follow-up with rheumatologist (Dr. Johnston) on discharge

## 2023-10-09 NOTE — DISCHARGE NOTE PROVIDER - CARE PROVIDERS DIRECT ADDRESSES
,DirectAddress_Unknown ,DirectAddress_Unknown,DirectAddress_Unknown ,DirectAddress_Unknown,[email or web address] ,DirectAddress_Unknown,[email or web address],DirectAddress_Unknown

## 2023-10-09 NOTE — DISCHARGE NOTE PROVIDER - CARE PROVIDER_API CALL
Cathie Jeffrey  Phone: [phone]  Fax: (   )    -  Follow Up Time:    Cathie Jeffrey  Hematology/Oncology  [address] [facility] [place] [address]  Phone: [phone]  Fax: (   )    -  Established Patient  Scheduled Appointment: 10/18/2023 03:15 PM   Cathie Jeffrey  Hematology/Oncology  [address] [place] [address]  Phone: [phone]  Fax: (   )    -  Established Patient  Scheduled Appointment: 10/18/2023 03:15 PM    Sedrick Delaney  Medical Oncology  [address]  [place] [address]  Phone: [phone]  Fax: [phone]  Follow Up Time: 1 week   Sedrick Delaney  Medical Oncology  [address]  [place] [address]  Phone: [phone]  Fax: [phone]  Scheduled Appointment: 10/19/2023 10:00 AM    Cathie Jeffrey  Hematology/Oncology  [facility] [place] [address]  Phone: [phone]  Fax: (   )    -  Established Patient  Scheduled Appointment: 10/18/2023 03:15 PM   Sedrick Delaney  Medical Oncology  [address], [address]  [place] [address]  Phone: [phone]  Fax: [phone]  Scheduled Appointment: 10/19/2023 10:00 AM   Sedrick Delaney  Medical Oncology  [address], [address]  [place] [address]  Phone: [phone]  Fax: [phone]  Scheduled Appointment: 10/19/2023 10:00 AM    Marissa Douglass  Critical Care Medicine  [address] [address]  Phone: [phone]  Fax: [phone]  Follow Up Time: 2 weeks   Sedrick Delaney  Medical Oncology  [address], [address]  [place] [address]  Phone: [phone]  Fax: [phone]  Scheduled Appointment: 10/19/2023 10:00 AM    Marissa Douglass  Critical Care Medicine  [address] [address]  Phone: [phone]  Fax: [phone]  Established Patient  Scheduled Appointment: 11/01/2023 02:00 PM    Cathie Jeffrey  Hematology/Oncology  [address] [place] [address]  Phone: [phone]  Fax: (   )    -  Established Patient  Scheduled Appointment: 10/18/2023 03:15 PM   Sedrick Delaney  Medical Oncology  [address]  [place] [address]  Phone: [phone]  Fax: [phone]  Scheduled Appointment: 10/19/2023 10:00 AM    Marissa Douglass  Critical Care Medicine  [address] [address]  Phone: [phone]  Fax: [phone]  Established Patient  Scheduled Appointment: 11/01/2023 02:00 PM    Cathie Jeffrey  Hematology/Oncology  [address] [place] [address]  Phone: [phone]  Fax: (   )    -  Established Patient  Scheduled Appointment: 10/25/2023 08:20 AM

## 2023-10-09 NOTE — DISCHARGE NOTE PROVIDER - NPI NUMBER (FOR SYSADMIN USE ONLY) :
[UNKNOWN] [UNKNOWN],[[id number]] [[id number]],[UNKNOWN] [[id number]] [[id number]],[[id number]] [[id number]],[[id number]],[UNKNOWN]

## 2023-10-09 NOTE — PROGRESS NOTE ADULT - ASSESSMENT
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

## 2023-10-09 NOTE — DISCHARGE NOTE PROVIDER - DETAILS OF MALNUTRITION DIAGNOSIS/DIAGNOSES
This patient has been assessed with a concern for Malnutrition and was treated during this hospitalization for the following Nutrition diagnosis/diagnoses:     -  10/09/2023: Severe protein-calorie malnutrition

## 2023-10-09 NOTE — DIETITIAN INITIAL EVALUATION ADULT - OTHER CALCULATIONS
Based on Standards of Care patient above % IBW (122%) thus ideal body weight used for all calculations (115#). Needs adjusted for advanced age, ca dx, malnutrition. Fluid recs per team.

## 2023-10-09 NOTE — DIETITIAN INITIAL EVALUATION ADULT - OTHER INFO
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    Patient seen at bedside for MST assessment. Current diet order: Consistent Carbohydrate. Confirms NKFA. No difficulty chewing/swallowing reported. Reports good appetite, observed patient eating breakfast at time of assessment. PTA, patient reports "normal" appetite and PO intake, however endorses 100# weight loss x 1 year, partially intentional and partially related to ca dx. Suspect likely meeting <75% nutrition needs >1 month. Dosing weight: 140#, BMI 24.8, reports UBW of 230#. -90#/39% x 1 year, clinically significant. Encouraged adequate PO intake and reviewed food preferences: toast, crackers, cheese, chicken, turkey. Discussed increased nutrient needs in view of ca dx, patient amenable to add Glucerna 1x/day. Educated patient on drinking supplement between meals to optimize PO intake of food at meal times, patient expressed understanding. No PI or edema documented. Denies N/V/D/C/abd pain, last BM 10/6 per EMR. Labs: Na low (132), BUN elevated (34), GFR 51, glucose trending 244-339 x 24 hours, HgbA1c 10.0%. Discussed consistent carbohydrate diet recommendations, patient expressed understanding. Meds: insulin, folate, vitamin B12. Observed with no overt signs and symptoms of muscle or fat wasting. Based on ASPEN guidelines, patient meets criteria for severe malnutrition. See nutrition recommendations below.  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    Patient seen at bedside for MST assessment. Current diet order: Consistent Carbohydrate. Confirms NKFA. No difficulty chewing/swallowing reported. Reports good appetite, observed patient eating breakfast at time of assessment. PTA, patient reports "normal" appetite and PO intake, however endorses 100# weight loss x 1 year, partially intentional and partially related to ca dx. Suspect likely meeting <75% nutrition needs >1 month. Dosing weight: 140#, BMI 24.8, reports UBW of 230#. -90#/39% x 1 year, clinically significant. Discussed goal of weight maintenance at this time. Encouraged adequate PO intake and reviewed food preferences: toast, crackers, cheese, chicken, turkey. Discussed increased nutrient needs in view of ca dx, patient amenable to add Glucerna 1x/day. Educated patient on drinking supplement between meals to optimize PO intake of food at meal times, patient expressed understanding. No PI or edema documented. Denies N/V/D/C/abd pain, last BM 10/6 per EMR. Labs: Na low (132), BUN elevated (34), GFR 51, glucose trending 244-339 x 24 hours, HgbA1c 10.0%. Discussed consistent carbohydrate diet recommendations, patient expressed understanding. Meds: insulin, folate, vitamin B12. Observed with no overt signs and symptoms of muscle or fat wasting. Based on ASPEN guidelines, patient meets criteria for severe malnutrition. See nutrition recommendations below.

## 2023-10-09 NOTE — CONSULT NOTE ADULT - SUBJECTIVE AND OBJECTIVE BOX
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 Pt has history of similar pain caused by RA. She has been having an RA flare for the past 2 weeks, similar to prior flares but more severe, unable to get out of bed this morning, and called Dr. Douglass who advised her to present to the ED. She reports starting chemotherapy at [facility] on 09/07 (unsure of agent) and was planned for 2nd session 3 weeks later, but fell after getting up from bed, prompting admission to [facility].    Admit: October 7, 2023      Last admission:  July 21, 2023 for unsteadiness and dizziness x1 week. CTH was completed in the ED concerning for age indeterminate L cerebellar lacunar infarcts.  Allergies: Bactrim

## 2023-10-09 NOTE — DIETITIAN INITIAL EVALUATION ADULT - PERTINENT LABORATORY DATA
Please follow up with your Primary care provider within 2-5 days if your signs and symptoms have not resolved or worsen.     If your condition worsens or fails to improve we recommend that you receive another evaluation at the emergency room immediately or contact your primary medical clinic to discuss your concerns.    You must understand that you have received an Urgent Care treatment only and that you may be released before all of your medical problems are known or treated.   You, the patient, will arrange for follow up care as instructed.   You have been given an antibiotic to treat your condition today.  Please complete the antibiotic as directed on the bottle.     As with any antibiotics, use probiotics and/or high culture yogurt about 2 hours apart from the antibiotic and about 1 week after the antibiotic to replace the gut isabel lost with antibiotic use.      If you are female and on BCP use additional methods to prevent pregnancy while on antibiotics and for one cycle after.         Infected Insect Bite or Sting    When an insect stings you, it injects venom. When an insect bites you, it does not. Stings and bites may cause a local reaction. Or they may cause a reaction that affects your whole body. Bites and stings may become infected. Signs of infection include redness, warmth, pain, drainage of pus, and swelling. Infections will need treatment with antibiotics and should get better over the next 10 days.  Home care  The following will help you care for your bite or sting at home:  · If a stinger is still in your skin, it will need to be removed. Don't use tweezers. Gently scrape the stinger from the side with a firm object such as the side of a credit card. This will loosen it and remove it from your skin.  · If itching is a problem, applying ice packs to the sting area will help.  · Wash the area with soap and water at least 3 times a day. Apply a topical antibiotic cream or ointment.  · You can use an  over-the counter antihistamine unless your doctor has given you a prescription antihistamine. You may use antihistamines to reduce itching if large areas of the skin are involved. Use lower doses during the daytime and higher doses at bedtime since the drug may make you sleepy. Don't use an antihistamine if you have glaucoma or if you are a man with trouble urinating due to an enlarged prostate. Some antihistamines cause less drowsiness and are a good choice for daytime use.  · If oral antibiotics have been prescribed, be sure to take them as directed until they are all finished.  · You may use over-the-counter pain medicine to control pain, unless another pain medicine was prescribed. Talk with your doctor before using acetaminophen or ibuprofen if you have chronic liver or kidney disease. Also talk with your doctor if you have ever had a stomach ulcer or gastrointestinal bleeding.  Follow-up care  Follow up with your healthcare provider, or as advised if you don't get better over the next 2 days or if your symptoms get worse.  Call 911  Call 911 if any of these occur:  · Swelling of the face, eyelids, mouth, throat, or tongue  · Difficulty swallowing or breathing  When to seek medical advice  Call your healthcare provider right away if any of these occur:  · Spreading areas of redness or swelling  · Fever of 100.4°F (38°C) or higher, or as directed by your healthcare provider  · Increased local pain  · Headache, fever, chills, muscle or joint aching, or vomiting,  · New rash  Date Last Reviewed: 10/1/2016  © 7756-9239 [employer]. [address] [place] [address], [place] [address]. All rights reserved. This information is not intended as a substitute for professional medical care. Always follow your healthcare professional's instructions.           [facility] for Tobacco Control email [email or web address]    132<L>  |  101  |  34<H>  ----------------------------<  271<H>  3.8   |  21<L>  |  1.14    Ca    8.6      09 Oct 2023 05:30  Phos  2.2     10-09  Mg     1.8     10-09    TPro  6.3  /  Alb  2.7<L>  /  TBili  0.2  /  DBili  x   /  AST  18  /  ALT  9<L>  /  AlkPhos  109  10-09  POCT Blood Glucose.: 339 mg/dL (10-09-23 @ 13:18)  A1C with Estimated Average Glucose Result: 10.0 % (10-08-23 @ 05:30)  A1C with Estimated Average Glucose Result: 7.4 % (07-10-23 @ 05:30)

## 2023-10-09 NOTE — OCCUPATIONAL THERAPY INITIAL EVALUATION ADULT - PERTINENT HX OF CURRENT PROBLEM, REHAB EVAL
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 Pt has history of similar pain caused by RA. Reports taking sulfasalazine and Plaquenil, Tylenol for pain. No NSAID use d/t CKD. She was recently admitted to [facility] for the pain and her sulfasalazine dose was increased to 1000 mg BID. She has been having an RA flare for the past 2 weeks, similar to prior flares but more severe, unable to get out of bed this morning, and called Dr. Douglass who advised her to present to the ED. She reports [place] [facility] (unsure of agent) and was planned for 2nd session 3 weeks later, but fell after getting up from bed, prompting admission to [facility]. Denies SOB, cough, fever, chills, headache, vision changes, confusion.

## 2023-10-09 NOTE — OCCUPATIONAL THERAPY INITIAL EVALUATION ADULT - DIAGNOSIS, OT EVAL
Pt presents to [facility] with RA flare, deconditioning, and frequent falls. Pt referred to OT with deconditioning, impaired balance, generalized weakness impacting ADL performance and functional mobility

## 2023-10-09 NOTE — DISCHARGE NOTE PROVIDER - NSDCMRMEDTOKEN_GEN_ALL_CORE_FT
Admelog 100 units/mL injectable solution: 2 unit(s) injectable 3 times a day (with meals) as needed for hyperglycemia 2 Unit(s) if Glucose 151 - 200  4 Unit(s) if Glucose 201 - 250  6 Unit(s) if Glucose 251 - 300  8 Unit(s) if Glucose 301 - 350  10 Unit(s) if Glucose 351 - 400  12 Unit(s) if Glucose Greater Than 400  Admelog 100 units/mL injectable solution: 5 unit(s) injectable 3 times a day (with meals) 5 units with small meals, 10 units with normal sized meals  amLODIPine 10 mg oral tablet: 1 tab(s) orally once a day  aspirin 81 mg oral tablet, chewable: 1 tab(s) orally once a day  calcium carbonate 500 mg (200 mg elemental calcium) oral tablet, chewable: 1 tab(s) orally 2 times a day  Farxiga 5 mg oral tablet: 1 tab(s) orally once a day (at bedtime)  ferrous sulfate 324 mg (65 mg elemental iron) oral delayed release tablet: 1 tab(s) orally once a day  folic acid 1 mg oral tablet: 1 tab(s) orally once a day  hydroxychloroquine 200 mg oral tablet: 1 tab(s) orally 2 times a day  Januvia 50 mg oral tablet: 1 tab(s) orally once a day  lidocaine 4% topical film: Apply topically to affected area once a day  Lipitor 40 mg oral tablet: 1 tab(s) orally once a day  losartan 100 mg oral tablet: 1 tab(s) orally once a day  metoprolol succinate 100 mg oral tablet, extended release: 1 tab(s) orally once a day  Multiple Vitamins oral capsule: 1 cap(s) orally once a day  sulfaSALAzine 500 mg oral tablet: 2 tab(s) orally 2 times a day  Tylenol 500 mg oral tablet: 1 tab(s) orally every 6 hours, As Needed  Vitamin B12 1000 mcg oral tablet: 1 tab(s) orally once a day  Vitamin C 500 mg oral tablet: 1 tab(s) orally once a day  Vitamin D3 25 mcg (1000 intl units) oral tablet: 1 tab(s) orally once a day   Admelog 100 units/mL injectable solution: 2 unit(s) injectable 3 times a day (with meals) as needed for hyperglycemia 2 Unit(s) if Glucose 151 - 200  4 Unit(s) if Glucose 201 - 250  6 Unit(s) if Glucose 251 - 300  8 Unit(s) if Glucose 301 - 350  10 Unit(s) if Glucose 351 - 400  12 Unit(s) if Glucose Greater Than 400  Admelog 100 units/mL injectable solution: 5 unit(s) injectable 3 times a day (with meals) 5 units with small meals, 10 units with normal sized meals  amLODIPine 10 mg oral tablet: 1 tab(s) orally once a day  aspirin 81 mg oral tablet, chewable: 1 tab(s) orally once a day  calcium carbonate 500 mg (200 mg elemental calcium) oral tablet, chewable: 1 tab(s) orally 2 times a day  Dexcom G7 [place]: Use as directed  Dexcom G7 Sensor: Change every 10 days  Farxiga 5 mg oral tablet: 1 tab(s) orally once a day (at bedtime)  ferrous sulfate 324 mg (65 mg elemental iron) oral delayed release tablet: 1 tab(s) orally once a day  folic acid 1 mg oral tablet: 1 tab(s) orally once a day  hydroxychloroquine 200 mg oral tablet: 1 tab(s) orally 2 times a day  Januvia 50 mg oral tablet: 1 tab(s) orally once a day  lidocaine 4% topical film: Apply topically to affected area once a day  Lipitor 40 mg oral tablet: 1 tab(s) orally once a day  losartan 100 mg oral tablet: 1 tab(s) orally once a day  metoprolol succinate 100 mg oral tablet, extended release: 1 tab(s) orally once a day  Multiple Vitamins oral capsule: 1 cap(s) orally once a day  predniSONE 5 mg oral tablet: 1 tab(s) orally once a day  predniSONE 5 mg oral tablet: 1 tab(s) orally once a day Please take through 11/1 (until your appointment with Dr. Douglass)  sulfaSALAzine 500 mg oral tablet: 2 tab(s) orally 2 times a day  Tylenol 500 mg oral tablet: 1 tab(s) orally every 6 hours, As Needed  Vitamin B12 1000 mcg oral tablet: 1 tab(s) orally once a day  Vitamin C 500 mg oral tablet: 1 tab(s) orally once a day  Vitamin D3 25 mcg (1000 intl units) oral tablet: 1 tab(s) orally once a day   amLODIPine 10 mg oral tablet: 1 tab(s) orally once a day  aspirin 81 mg oral tablet, chewable: 1 tab(s) orally once a day  Basaglar KwikPen 100 units/mL subcutaneous solution: 15 unit(s) subcutaneous once a day Inject once at bedtime  calcium carbonate 500 mg (200 mg elemental calcium) oral tablet, chewable: 1 tab(s) orally 2 times a day  Farxiga 5 mg oral tablet: 1 tab(s) orally once a day (at bedtime)  ferrous sulfate 324 mg (65 mg elemental iron) oral delayed release tablet: 1 tab(s) orally once a day  folic acid 1 mg oral tablet: 1 tab(s) orally once a day  hydroxychloroquine 200 mg oral tablet: 1 tab(s) orally 2 times a day  Januvia 50 mg oral tablet: 1 tab(s) orally once a day  lidocaine 4% topical film: Apply topically to affected area once a day  Lipitor 40 mg oral tablet: 1 tab(s) orally once a day  losartan 100 mg oral tablet: 1 tab(s) orally once a day  metoprolol succinate 100 mg oral tablet, extended release: 1 tab(s) orally once a day  Multiple Vitamins oral capsule: 1 cap(s) orally once a day  Prandin 1 mg oral tablet: 1 tab(s) orally 3 times a day Please take three times a day before meals  predniSONE 5 mg oral tablet: 1 tab(s) orally once a day Please take through 11/1 (until your appointment with Dr. Douglass)  predniSONE 5 mg oral tablet: 1 tab(s) orally once a day  sulfaSALAzine 500 mg oral tablet: 2 tab(s) orally 2 times a day  Tylenol 500 mg oral tablet: 1 tab(s) orally every 6 hours, As Needed  Vitamin B12 1000 mcg oral tablet: 1 tab(s) orally once a day  Vitamin C 500 mg oral tablet: 1 tab(s) orally once a day  Vitamin D3 25 mcg (1000 intl units) oral tablet: 1 tab(s) orally once a day   amLODIPine 10 mg oral tablet: 1 tab(s) orally once a day  aspirin 81 mg oral tablet, chewable: 1 tab(s) orally once a day  Basaglar KwikPen 100 units/mL subcutaneous solution: 15 unit(s) subcutaneous once a day Inject once at bedtime  calcium carbonate 500 mg (200 mg elemental calcium) oral tablet, chewable: 1 tab(s) orally 2 times a day  Farxiga 5 mg oral tablet: 1 tab(s) orally once a day (at bedtime)  ferrous sulfate 324 mg (65 mg elemental iron) oral delayed release tablet: 1 tab(s) orally once a day  folic acid 1 mg oral tablet: 1 tab(s) orally once a day  hydroxychloroquine 200 mg oral tablet: 1 tab(s) orally 2 times a day  Januvia 50 mg oral tablet: 1 tab(s) orally once a day  lidocaine 4% topical film: Apply topically to affected area once a day  Lipitor 40 mg oral tablet: 1 tab(s) orally once a day  losartan 100 mg oral tablet: 1 tab(s) orally once a day  metoprolol succinate 100 mg oral tablet, extended release: 1 tab(s) orally once a day  Multiple Vitamins oral capsule: 1 cap(s) orally once a day  Prandin 1 mg oral tablet: 1 tab(s) orally 3 times a day Please take three times a day before meals  predniSONE 5 mg oral tablet: 1 tab(s) orally once a day Please take through 11/1 (until your appointment with Dr. Douglass)  sulfaSALAzine 500 mg oral tablet: 2 tab(s) orally 2 times a day  Tylenol 500 mg oral tablet: 1 tab(s) orally every 6 hours, As Needed  Vitamin B12 1000 mcg oral tablet: 1 tab(s) orally once a day  Vitamin C 500 mg oral tablet: 1 tab(s) orally once a day  Vitamin D3 25 mcg (1000 intl units) oral tablet: 1 tab(s) orally once a day   amLODIPine 10 mg oral tablet: 1 tab(s) orally once a day  aspirin 81 mg oral tablet, chewable: 1 tab(s) orally once a day  Basaglar KwikPen 100 units/mL subcutaneous solution: 15 unit(s) subcutaneous once a day Inject once at bedtime  calcium carbonate 500 mg (200 mg elemental calcium) oral tablet, chewable: 1 tab(s) orally 2 times a day  Farxiga 10 mg oral tablet: 1 tab(s) orally once a day (at bedtime)  ferrous sulfate 324 mg (65 mg elemental iron) oral delayed release tablet: 1 tab(s) orally once a day  folic acid 1 mg oral tablet: 1 tab(s) orally once a day  hydroxychloroquine 200 mg oral tablet: 1 tab(s) orally 2 times a day  Januvia 100 mg oral tablet: 1 tab(s) orally once a day  lidocaine 4% topical film: Apply topically to affected area once a day  Lipitor 40 mg oral tablet: 1 tab(s) orally once a day  losartan 100 mg oral tablet: 1 tab(s) orally once a day  metoprolol succinate 100 mg oral tablet, extended release: 1 tab(s) orally once a day  Multiple Vitamins oral capsule: 1 cap(s) orally once a day  Prandin 1 mg oral tablet: 1 tab(s) orally 3 times a day Please take three times a day before meals  predniSONE 5 mg oral tablet: 1 tab(s) orally once a day Please take through 11/1 (until your appointment with Dr. Douglass)  sulfaSALAzine 500 mg oral tablet: 2 tab(s) orally 2 times a day  Tylenol 500 mg oral tablet: 1 tab(s) orally every 6 hours, As Needed  Vitamin B12 1000 mcg oral tablet: 1 tab(s) orally once a day  Vitamin C 500 mg oral tablet: 1 tab(s) orally once a day  Vitamin D3 25 mcg (1000 intl units) oral tablet: 1 tab(s) orally once a day

## 2023-10-09 NOTE — PROGRESS NOTE ADULT - PROBLEM SELECTOR PLAN 6
Plan:  F: tolerating PO no fluids needed  E: replete K<4, Mg<2  N: regular  VTE Prophylaxis: lovenox  GI: none  C: Full Code  D: RMF

## 2023-10-09 NOTE — DIETITIAN INITIAL EVALUATION ADULT - PERTINENT MEDS FT
MEDICATIONS  (STANDING):  amLODIPine   Tablet 10 milliGRAM(s) Oral every 24 hours  aspirin  chewable 81 milliGRAM(s) Oral daily  atorvastatin 40 milliGRAM(s) Oral at bedtime  cyanocobalamin 1000 MICROGram(s) Oral daily  dextrose 5%. 1000 milliLiter(s) (50 mL/Hr) IV Continuous <Continuous>  dextrose 5%. 1000 milliLiter(s) (100 mL/Hr) IV Continuous <Continuous>  dextrose 50% Injectable 25 Gram(s) IV Push once  dextrose 50% Injectable 12.5 Gram(s) IV Push once  dextrose 50% Injectable 25 Gram(s) IV Push once  enoxaparin Injectable 40 milliGRAM(s) SubCutaneous every 24 hours  folic acid 1 milliGRAM(s) Oral daily  glucagon  Injectable 1 milliGRAM(s) IntraMuscular once  hydroxychloroquine 200 milliGRAM(s) Oral two times a day  influenza  Vaccine (HIGH DOSE) 0.7 milliLiter(s) IntraMuscular once  insulin glargine Injectable (LANTUS) 7 Unit(s) SubCutaneous at bedtime  insulin lispro (ADMELOG) corrective regimen sliding scale   SubCutaneous Before meals and at bedtime  insulin lispro Injectable (ADMELOG) 5 Unit(s) SubCutaneous three times a day before meals  losartan 100 milliGRAM(s) Oral every 24 hours  metoprolol succinate  milliGRAM(s) Oral daily  predniSONE   Tablet 20 milliGRAM(s) Oral daily  sulfaSALAzine Entabs 1000 milliGRAM(s) Oral every 12 hours    MEDICATIONS  (PRN):  acetaminophen     Tablet .. 650 milliGRAM(s) Oral every 6 hours PRN Temp greater or equal to 38C (100.4F), Mild Pain (1 - 3)  aluminum hydroxide/magnesium hydroxide/simethicone Suspension 30 milliLiter(s) Oral every 6 hours PRN Dyspepsia  dextrose Oral Gel 15 Gram(s) Oral once PRN Blood Glucose LESS THAN 70 milliGRAM(s)/deciliter  melatonin 3 milliGRAM(s) Oral at bedtime PRN Insomnia

## 2023-10-09 NOTE — DISCHARGE NOTE PROVIDER - NSDCCPCAREPLAN_GEN_ALL_CORE_FT
PRINCIPAL DISCHARGE DIAGNOSIS  Diagnosis: Rheumatoid arthritis flare  Assessment and Plan of Treatment: You have a history of rheumatoid arthirits. This is a chronic inflammatory disorder affecting many joints including the hands and feet. This is a disease in which the immune system attacks the bodies joints causing inflammation and pain. We treated you with prednisone which helped decrease the bodies inflammatory response. You reported improved symptoms. You have a follow up with your rheumatologist on 10/25. Please follow up with your doctor to review the hospital course. Please continue to take all of your medicaitons as originally perscribed by your doctor.      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 You completed 3 rounds of radiation therapy. We spoke with the radiation oncologist who recommended an MRI of the head to evaluate the treatment.   You also recently received chemotherapy. We spoke with your oncologist Dr. Jeffrey who said she will discuss with you next steps in clinic at your next appointment. Please follow up at the scheduled time.     PRINCIPAL DISCHARGE DIAGNOSIS  Diagnosis: Rheumatoid arthritis flare  Assessment and Plan of Treatment: You have a history of rheumatoid arthirits. This is a chronic inflammatory disorder affecting many joints including the hands and feet. This is a disease in which the immune system attacks the bodies joints causing inflammation and pain. We treated you with prednisone which helped decrease the bodies inflammatory response. You reported improved symptoms. You have a follow up with your rheumatologist on 10/25. Please follow up with your doctor to review the hospital course. Please continue to take all of your medicaitons as originally perscribed by your doctor.      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 You completed 3 rounds of radiation therapy. We spoke with the radiation oncologist who recommended an MRI of the head to evaluate the treatment.   You also recently received chemotherapy. We spoke with your oncologist Dr. Jeffrey who said she will discuss with you next steps in clinic at your next appointment. You also mentioned you would like to transfer your care to [facility]. If this is the case, you have an appointment with Dr. Delaney. Please cancel the appointment you do not wish to follow with.     PRINCIPAL DISCHARGE DIAGNOSIS  Diagnosis: Rheumatoid arthritis flare  Assessment and Plan of Treatment: You have a history of rheumatoid arthirits. This is a chronic inflammatory disorder affecting many joints including the hands and feet. This is a disease in which the immune system attacks the bodies joints causing inflammation and pain. We treated you with prednisone which helped decrease the bodies inflammatory response. You reported improved symptoms. You have a follow up with your rheumatologist on 10/25. Please follow up with your doctor to review the hospital course. Please continue to take all of your medicaitons as originally perscribed by your doctor.      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 You completed 3 rounds of radiation therapy. We spoke with the radiation oncologist who recommended an MRI of the head to evaluate the treatment.   You also recently received chemotherapy on 9/07. After discussion, you decided to transition your care to [facility]. Please follow up with your appointment with Dr. Delaney (scheduled below) on 10/19.     PRINCIPAL DISCHARGE DIAGNOSIS  Diagnosis: Rheumatoid arthritis flare  Assessment and Plan of Treatment: You have a history of rheumatoid arthirits. This is a chronic inflammatory disorder affecting many joints including the hands and feet. This is a disease in which the immune system attacks the bodies joints causing inflammation and pain. We treated you with prednisone which helped decrease the bodies inflammatory response. You reported improved symptoms. We are sending you home on prednisone 5 mg until you follow up with Dr. Douglass within the next two weeks. To control your blood sugar we recommend lispro ______ and lantus ______. You have a follow up with your rheumatologist on 10/25. Please follow up with your doctor to review the hospital course. Please continue to take all of your medications as originally perscribed by your doctor.      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 You completed 3 rounds of radiation therapy. We spoke with the radiation oncologist who recommended an MRI of the head to evaluate the treatment.   You also recently received chemotherapy on 9/07. After discussion, you decided to transition your care to [facility]. Please follow up with your appointment with Dr. Delaney (scheduled below) on 10/19.     PRINCIPAL DISCHARGE DIAGNOSIS  Diagnosis: Rheumatoid arthritis flare  Assessment and Plan of Treatment: You have a history of rheumatoid arthirits. This is a chronic inflammatory disorder affecting many joints including the hands and feet. This is a disease in which the immune system attacks the bodies joints causing inflammation and pain. We treated you with prednisone which helped decrease the bodies inflammatory response. You reported improved symptoms. We are sending you home on prednisone 5 mg until you follow up with Dr. Douglass within the next two weeks. You have a follow up with your rheumatologist on 10/25. Please speak with Dr. Johnston to also follow your rheumatoid arthritis during your visits as well. Please follow up with your doctor to review the hospital course. Please continue to take all of your medications as originally perscribed by your doctor.      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 You completed 3 rounds of radiation therapy. We spoke with the radiation oncologist who recommended an MRI of the head to evaluate the treatment.   You also recently received chemotherapy on . After discussion, you decided to transition your care to [facility]. Please follow up with your appointment with Dr. Delaney (scheduled below) on 10/19.    Diagnosis: Type 2 diabetes mellitus  Assessment and Plan of Treatment: Type 2 diabetes (sometimes called type 2 "diabetes mellitus") is a disorder that disrupts the way your body uses sugar. There are a few medicines that help control blood sugar. Some people need to take pills that help the body make more insulin or that help insulin do its job. Others need insulin injections.  Diabetes can be prevented. To reduce your chances of getting type 2 diabetes, the most important thing you can do is control your weight. If you already have the disorder, losing weight can improve your health and blood sugar control. Being active can also help prevent or control the disorder.   During your stay at the hospital your blood sugar was elevated (prednisone can also elevate your blood sugar) and we gave you insulin to help control that. We are discharging you with a new insulin regimen includin. 15 U lantus before bedtime   2. 1 mg Prandin three times daily (before meals)  3. Continue with Januvia 5 mg daily   4. Continue with Farxiga 5 daily   The endocrinology team you saw in the hospital will reach out to your to schedule an appointment to review your blood sugar control.

## 2023-10-09 NOTE — DIETITIAN INITIAL EVALUATION ADULT - PROBLEM SELECTOR PLAN 3
A1C results: 7.4 last admission 07/2023; endocrine consulted at that time deeming a finalized regimen of lispro 10 u before each meal; experienced nocturnal hypoglycemia and insulin regimen recently changed at [facility] given repeated hypoglycemia     -Continue lantus 5u qhs   -iSS

## 2023-10-09 NOTE — DISCHARGE NOTE PROVIDER - PROVIDER TOKENS
FREE:[LAST:[Rachele],FIRST:[Cathie],PHONE:[phone]],FAX:[(   )    -]] FREE:[LAST:[Rachele],FIRST:[Cathie],PHONE:[phone]],FAX:[(   )    -],ADDRESS:[Hematology/Oncology  [address] [place] [address]],SCHEDULEDAPPT:[10/18/2023],SCHEDULEDAPPTTIME:[03:15 PM],ESTABLISHEDPATIENT:[T]] FREE:[LAST:[Kimberliini],FIRST:[Cathie],PHONE:[phone]],FAX:[(   )    -],ADDRESS:[Hematology/Oncology  [address] [place] [address]],SCHEDULEDAPPT:[10/18/2023],SCHEDULEDAPPTTIME:[03:15 PM],ESTABLISHEDPATIENT:[T]],PROVIDER:[TOKEN:[[id number]],FOLLOWUP:[1 week]] PROVIDER:[TOKEN:[[id number]],SCHEDULEDAPPT:[10/19/2023],SCHEDULEDAPPTTIME:[10:00 AM]],FREE:[LAST:[Rachele],FIRST:[Cathie],PHONE:[phone]],FAX:[(   )    -],ADDRESS:[Hematology/Oncology  [address] [place] [address]],SCHEDULEDAPPT:[10/18/2023],SCHEDULEDAPPTTIME:[03:15 PM],ESTABLISHEDPATIENT:[T]] PROVIDER:[TOKEN:[[id number]],SCHEDULEDAPPT:[10/19/2023],SCHEDULEDAPPTTIME:[10:00 AM]] PROVIDER:[TOKEN:[[id number]],SCHEDULEDAPPT:[10/19/2023],SCHEDULEDAPPTTIME:[10:00 AM]],PROVIDER:[TOKEN:[[id number]],FOLLOWUP:[2 weeks]] PROVIDER:[TOKEN:[[id number]],SCHEDULEDAPPT:[10/19/2023],SCHEDULEDAPPTTIME:[10:00 AM]],PROVIDER:[TOKEN:[[id number]],SCHEDULEDAPPT:[11/01/2023],SCHEDULEDAPPTTIME:[02:00 PM],ESTABLISHEDPATIENT:[T]],FREE:[LAST:[Kimberliini],FIRST:[Cathie],PHONE:[phone]],FAX:[(   )    -],ADDRESS:[Hematology/Oncology  [address] [place] [address]],SCHEDULEDAPPT:[10/18/2023],SCHEDULEDAPPTTIME:[03:15 PM],ESTABLISHEDPATIENT:[T]] PROVIDER:[TOKEN:[[id number]],SCHEDULEDAPPT:[10/19/2023],SCHEDULEDAPPTTIME:[10:00 AM]],PROVIDER:[TOKEN:[[id number]],SCHEDULEDAPPT:[11/01/2023],SCHEDULEDAPPTTIME:[02:00 PM],ESTABLISHEDPATIENT:[T]],FREE:[LAST:[Kimberliini],FIRST:[Cathie],PHONE:[phone]],FAX:[(   )    -],ADDRESS:[Hematology/Oncology  [address] [place] [address]],SCHEDULEDAPPT:[10/25/2023],SCHEDULEDAPPTTIME:[08:20 AM],ESTABLISHEDPATIENT:[T]]

## 2023-10-09 NOTE — DIETITIAN NUTRITION RISK NOTIFICATION - ADDITIONAL COMMENTS/DIETITIAN RECOMMENDATIONS
1. Continue current diet order, recommend Glucerna 1x/day (220 kcal, 10g protein per serving)   2. Encourage pt to meet nutritional needs as able   3. Monitor labs: electrolytes, cmp, finger stick   4. Monitor weights  5. Pain and bowel regimen per team   6. Will continue to assess/honor food preferences as able  7. Monitor adherence to diet education  patient instructed; verbal instruction; consistent CHO diet, increased nutrient needs in view of ca dx, supplements between meals

## 2023-10-09 NOTE — PROGRESS NOTE ADULT - SUBJECTIVE AND OBJECTIVE BOX
Internal Medicine Progress Note  Marylin Charlton, PGY-1  Pager: [phone]    ******INCOMPLETE******    Patient is a 73y old  Female who presents with a chief complaint of RA flare (08 Oct 2023 11:09)    OVERNIGHT EVENTS/INTERVAL HPI:    REVIEW OF SYSTEMS:  All other review of systems is negative unless indicated above.    OBJECTIVE:  T(C): 36.4 (10-09-23 @ 05:59), Max: 36.4 (10-09-23 @ 00:10)  HR: 92 (10-09-23 @ 05:59) (81 - 101)  BP: 150/81 (10-09-23 @ 05:59) (143/74 - 161/77)  RR: 18 (10-09-23 @ 05:59) (17 - 18)  SpO2: 97% (10-09-23 @ 05:59) (95% - 97%)  Daily     Daily     Physical Exam:  General: in no acute distress  Eyes: EOMI intact bilaterally. Anicteric sclerae, moist conjunctivae  HENT: Moist mucous membranes  Neck: Trachea midline, supple  Lungs: CTA B/L. No wheezes, rales, or rhonchi  Cardiovascular: RRR. No murmurs, rubs, or gallops  Abdomen: Soft, non-tender non-distended; No rebound or guarding  Extremities: WWP, No clubbing, cyanosis or edema  MSK: No midline bony tenderness. No CVA tenderness bilaterally  Neurological: Alert and oriented x3  Skin: Warm and dry. No obvious rash     Medications:  MEDICATIONS  (STANDING):  amLODIPine   Tablet 10 milliGRAM(s) Oral every 24 hours  aspirin  chewable 81 milliGRAM(s) Oral daily  atorvastatin 40 milliGRAM(s) Oral at bedtime  cyanocobalamin 1000 MICROGram(s) Oral daily  dextrose 5%. 1000 milliLiter(s) (50 mL/Hr) IV Continuous <Continuous>  dextrose 5%. 1000 milliLiter(s) (100 mL/Hr) IV Continuous <Continuous>  dextrose 50% Injectable 25 Gram(s) IV Push once  dextrose 50% Injectable 12.5 Gram(s) IV Push once  dextrose 50% Injectable 25 Gram(s) IV Push once  enoxaparin Injectable 40 milliGRAM(s) SubCutaneous every 24 hours  folic acid 1 milliGRAM(s) Oral daily  glucagon  Injectable 1 milliGRAM(s) IntraMuscular once  hydroxychloroquine 200 milliGRAM(s) Oral two times a day  influenza  Vaccine (HIGH DOSE) 0.7 milliLiter(s) IntraMuscular once  insulin glargine Injectable (LANTUS) 7 Unit(s) SubCutaneous at bedtime  insulin lispro (ADMELOG) corrective regimen sliding scale   SubCutaneous Before meals and at bedtime  insulin lispro Injectable (ADMELOG) 5 Unit(s) SubCutaneous three times a day before meals  losartan 100 milliGRAM(s) Oral every 24 hours  metoprolol succinate  milliGRAM(s) Oral daily  predniSONE   Tablet 20 milliGRAM(s) Oral daily  sulfaSALAzine Entabs 1000 milliGRAM(s) Oral every 12 hours    MEDICATIONS  (PRN):  acetaminophen     Tablet .. 650 milliGRAM(s) Oral every 6 hours PRN Temp greater or equal to 38C (100.4F), Mild Pain (1 - 3)  aluminum hydroxide/magnesium hydroxide/simethicone Suspension 30 milliLiter(s) Oral every 6 hours PRN Dyspepsia  dextrose Oral Gel 15 Gram(s) Oral once PRN Blood Glucose LESS THAN 70 milliGRAM(s)/deciliter  melatonin 3 milliGRAM(s) Oral at bedtime PRN Insomnia      Labs:                        10.9   19.72 )-----------( 271      ( 09 Oct 2023 05:30 )             33.6     10-07    136  |  102  |  14  ----------------------------<  122<H>  3.8   |  20<L>  |  1.01    Ca    9.4      07 Oct 2023 20:01        Urinalysis Basic - ( 07 Oct 2023 20:01 )    Color: x / Appearance: x / SG: x / pH: x  Gluc: 122 mg/dL / Ketone: x  / Bili: x / Urobili: x   Blood: x / Protein: x / Nitrite: x   Leuk Esterase: x / RBC: x / WBC x   Sq Epi: x / Non Sq Epi: x / Bacteria: x      COVID-19 PCR: NotDetec (21 Jul 2023 19:49)  COVID-19 PCR: NotDetec (13 Jul 2023 05:30)      Radiology: Reviewed Patient is a 73y old  Female who presents with a chief complaint of RA flare (08 Oct 2023 11:09)    OVERNIGHT EVENTS/INTERVAL HPI: No acute events overnight. Pt seen and examined at bedside this AM. States she has no complaints. Pain is improving. Denies fevers, chills, nausea, vomiting, SOB, diarrhea or dysuria.     REVIEW OF SYSTEMS:  All other review of systems is negative unless indicated above.    OBJECTIVE:  T(C): 36.4 (10-09-23 @ 05:59), Max: 36.4 (10-09-23 @ 00:10)  HR: 92 (10-09-23 @ 05:59) (81 - 101)  BP: 150/81 (10-09-23 @ 05:59) (143/74 - 161/77)  RR: 18 (10-09-23 @ 05:59) (17 - 18)  SpO2: 97% (10-09-23 @ 05:59) (95% - 97%)  Daily     Daily     Physical Exam:  General: in no acute distress  Lungs: CTA B/L. No wheezes, rales, or rhonchi  Cardiovascular: RRR. No murmurs, rubs, or gallops  Abdomen: Soft, non-tender non-distended; No rebound or guarding  Extremities: WWP, No clubbing, cyanosis or edema  MSK: ulnar deviation b/l  Neurological: Alert and oriented x3  Skin: Warm and dry. No obvious rash     Medications:  MEDICATIONS  (STANDING):  amLODIPine   Tablet 10 milliGRAM(s) Oral every 24 hours  aspirin  chewable 81 milliGRAM(s) Oral daily  atorvastatin 40 milliGRAM(s) Oral at bedtime  cyanocobalamin 1000 MICROGram(s) Oral daily  dextrose 5%. 1000 milliLiter(s) (50 mL/Hr) IV Continuous <Continuous>  dextrose 5%. 1000 milliLiter(s) (100 mL/Hr) IV Continuous <Continuous>  dextrose 50% Injectable 25 Gram(s) IV Push once  dextrose 50% Injectable 12.5 Gram(s) IV Push once  dextrose 50% Injectable 25 Gram(s) IV Push once  enoxaparin Injectable 40 milliGRAM(s) SubCutaneous every 24 hours  folic acid 1 milliGRAM(s) Oral daily  glucagon  Injectable 1 milliGRAM(s) IntraMuscular once  hydroxychloroquine 200 milliGRAM(s) Oral two times a day  influenza  Vaccine (HIGH DOSE) 0.7 milliLiter(s) IntraMuscular once  insulin glargine Injectable (LANTUS) 7 Unit(s) SubCutaneous at bedtime  insulin lispro (ADMELOG) corrective regimen sliding scale   SubCutaneous Before meals and at bedtime  insulin lispro Injectable (ADMELOG) 5 Unit(s) SubCutaneous three times a day before meals  losartan 100 milliGRAM(s) Oral every 24 hours  metoprolol succinate  milliGRAM(s) Oral daily  predniSONE   Tablet 20 milliGRAM(s) Oral daily  sulfaSALAzine Entabs 1000 milliGRAM(s) Oral every 12 hours    MEDICATIONS  (PRN):  acetaminophen     Tablet .. 650 milliGRAM(s) Oral every 6 hours PRN Temp greater or equal to 38C (100.4F), Mild Pain (1 - 3)  aluminum hydroxide/magnesium hydroxide/simethicone Suspension 30 milliLiter(s) Oral every 6 hours PRN Dyspepsia  dextrose Oral Gel 15 Gram(s) Oral once PRN Blood Glucose LESS THAN 70 milliGRAM(s)/deciliter  melatonin 3 milliGRAM(s) Oral at bedtime PRN Insomnia      Labs:                        10.9   19.72 )-----------( 271      ( 09 Oct 2023 05:30 )             33.6     10-07    136  |  102  |  14  ----------------------------<  122<H>  3.8   |  20<L>  |  1.01    Ca    9.4      07 Oct 2023 20:01        Urinalysis Basic - ( 07 Oct 2023 20:01 )    Color: x / Appearance: x / SG: x / pH: x  Gluc: 122 mg/dL / Ketone: x  / Bili: x / Urobili: x   Blood: x / Protein: x / Nitrite: x   Leuk Esterase: x / RBC: x / WBC x   Sq Epi: x / Non Sq Epi: x / Bacteria: x      COVID-19 PCR: NotDetec (21 Jul 2023 19:49)  COVID-19 PCR: NotDetec (13 Jul 2023 05:30)      Radiology: Reviewed

## 2023-10-09 NOTE — DISCHARGE NOTE PROVIDER - HOSPITAL COURSE
#Discharge: do not delete    Patient is __ yo M/F with past medical history of _____ presented with _____, found to have _____ (one liner)    Hospital course (by problem):     Patient was discharged to: (home/SULMA/acute rehab/hospice, etc, and with what services – home health PT/RN? 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   ·  Plan: Intermittent flares, now with b/l joint, arm, and shoulder pain c/b inability to get out of bed today 2/2 pain. Currently well controlled after steroids/tylenol. Home meds: sulfasalazine and plaquenil; currently on sulfasalzine 1000 mg BID (recently increased from 500 mg BID) and plaquenil 200 mg BID   ESR 55, CRP 13.6  - C/w home Sulfasalazine and Plaquenil  - C/w 5 days 20 mg prednisone  - F/u ESR/CRP  - Pain regimen: tylenol   - PT/OT  - Follow-up with rheumatologist (Dr. Johnston) on discharge.     Problem/Plan - 2:  ·  Problem: NSCLC metastatic to brain.   ·  Plan: Dx with NSCLC with mets to brain on last admission, started CRT, last chemo being 09/07 at [facility], unknown agent per pt, last RT completed on previous admission 07/2023    - Continue B12/folate supplementation   - Pt s/p 3 radiation treatments; no need for rad onc consult  - Spoke w/ outpatient physician Dr. Cathie Jeffrey ([phone]) stated pt is s/p one cycle of pembrolizumab in mid September and has been in and out of the hospital since. Believes the flares may be due to the chemo and will reassess the plan at her next appt.     Problem/Plan - 3:  ·  Problem: Type 2 diabetes mellitus.   ·  Plan: A1C results: 7.4 last admission 07/2023; endocrine consulted at that time deeming a finalized regimen of lispro 10 u before each meal; experienced nocturnal hypoglycemia and insulin regimen recently changed at [facility] given repeated hypoglycemia   BS >300  - Increase lispro to 5 TID and Lantus to 7  -iSS.     Problem/Plan - 4:  ·  Problem: CKD (chronic kidney disease).   ·  Plan: baseline Cr 2, currently 1.01, voiding     - Trend BUN/Cr  - Avoid nephrotoxic meds.     Problem/Plan - 5:  ·  Problem: HTN (hypertension).   ·  Plan: home meds: meds, amlodipine 10mg, losartan 100mg, Toprol 100mg    -Continue home meds.        Patient was discharged to: home    New medications: Prednisone  Changes to old medications: NA  Medications that were stopped: NA    Items to follow up as outpatient: Follow up with rheumatologist and oncologist     Physical exam at the time of discharge:    General: in no acute distress  Lungs: CTA B/L. No wheezes, rales, or rhonchi  Cardiovascular: RRR. No murmurs, rubs, or gallops  Abdomen: Soft, non-tender non-distended; No rebound or guarding  Extremities: WWP, No clubbing, cyanosis or edema  MSK: ulnar deviation b/l  Neurological: Alert and oriented x3  Skin: Warm and dry. 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   ·  Plan: Intermittent flares, now with b/l joint, arm, and shoulder pain c/b inability to get out of bed today 2/2 pain. Currently well controlled after steroids/tylenol. Home meds: sulfasalazine and plaquenil; currently on sulfasalzine 1000 mg BID (recently increased from 500 mg BID) and plaquenil 200 mg BID   ESR 55, CRP 13.6  - C/w home Sulfasalazine and Plaquenil  - C/w 5 days 20 mg prednisone  - Pain regimen: tylenol   - PT/OT  - Follow-up with rheumatologist (Dr. Johnston) on discharge.     Problem/Plan - 2:  ·  Problem: NSCLC metastatic to brain.   ·  Plan: Dx with NSCLC with mets to brain on last admission, started CRT, last [place] [facility], unknown agent per pt, last RT completed on previous admission 07/2023    - Continue B12/folate supplementation   - Pt s/p 3 radiation treatments; no need for rad onc consult  - Spoke w/ outpatient physician Dr. Cathie Jeffrey ([phone]) stated pt is s/p one cycle of pembrolizumab in mid September and has been in and out of the hospital since. Believes the flares may be due to the chemo and will reassess the plan at her next appt.     Problem/Plan - 3:  ·  Problem: Type 2 diabetes mellitus.   ·  Plan: A1C results: 7.4 last admission 07/2023; endocrine consulted at that time deeming a finalized regimen of lispro 10 u before each meal; experienced nocturnal hypoglycemia and insulin regimen recently changed at [facility] given repeated hypoglycemia   BS >300  - Increase lispro to 5 TID and Lantus to 7  -iSS.     Problem/Plan - 4:  ·  Problem: CKD (chronic kidney disease).   ·  Plan: baseline Cr 2, currently 1.01, voiding     - Trend BUN/Cr  - Avoid nephrotoxic meds.     Problem/Plan - 5:  ·  Problem: HTN (hypertension).   ·  Plan: home meds: meds, amlodipine 10mg, losartan 100mg, Toprol 100mg    -Continue home meds.        Patient was discharged to: home    New medications: Prednisone  Changes to old medications: NA  Medications that were stopped: NA    Items to follow up as outpatient: Follow up with rheumatologist and oncologist     Physical exam at the time of discharge:    General: in no acute distress  Lungs: CTA B/L. No wheezes, rales, or rhonchi  Cardiovascular: RRR. No murmurs, rubs, or gallops  Abdomen: Soft, non-tender non-distended; No rebound or guarding  Extremities: WWP, No clubbing, cyanosis or edema  MSK: ulnar deviation b/l  Neurological: Alert and oriented x3  Skin: Warm and dry. 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     #Rheumatoid arthritis flare.   ·  Plan: Intermittent flares, now with b/l joint, arm, and shoulder pain c/b inability to get out of bed today 2/2 pain likely 2/2 a reaction from chemotherapy on 9/07. Currently well controlled after steroids/tylenol.   Home meds: sulfasalazine and plaquenil; currently on sulfasalzine 1000 mg BID (recently increased from 500 mg BID) and plaquenil 200 mg BID   ESR 55, CRP 13.6  - C/w home Sulfasalazine and Plaquenil  - S/p 5 days 20 mg Prednisone and Prednisone taper  - Pain regimen: tylenol   - PT/OT  - Follow-up with rheumatologist (Dr. Johnston) on discharge.    #NSCLC metastatic to brain.   Dx with NSCLC with mets to brain on last admission, started CRT, last chemo being 09/07 at [facility] (pembrolizumab) and 3 rounds RT completed on previous admissions.   - Continue B12/folate supplementation   - Pt s/p 3 radiation treatments; repeat brain MRI   - F/u outpatient w/ oncologist     #Type 2 diabetes mellitus.   ·  Plan: A1C results: 7.4 last admission 07/2023  - Sugar control while on prednisone     #CKD (chronic kidney disease).   ·  Plan: baseline Cr 2, currently 1.01, voiding   - Trend BUN/Cr  - Avoid nephrotoxic meds.    #HTN (hypertension).   - C/w home meds: meds, amlodipine 10mg, losartan 100mg, Toprol 100mg        Patient was discharged to: home    New medications: Prednisone  Changes to old medications: NA  Medications that were stopped: NA    Items to follow up as outpatient: Follow up with rheumatologist and oncologist     Physical exam at the time of discharge:    General: in no acute distress  Lungs: CTA B/L. No wheezes, rales, or rhonchi  Cardiovascular: RRR. No murmurs, rubs, or gallops  Abdomen: Soft, non-tender non-distended; No rebound or guarding  Extremities: WWP, No clubbing, cyanosis or edema  MSK: ulnar deviation b/l  Neurological: Alert and oriented x3  Skin: Warm and dry. 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     #Rheumatoid arthritis flare.   ·  Plan: Intermittent flares, now with b/l joint, arm, and shoulder pain c/b inability to get out of bed today 2/2 pain likely 2/2 a reaction from chemotherapy on 9/07. Currently well controlled after steroids/tylenol.   Home meds: sulfasalazine and plaquenil; currently on sulfasalzine 1000 mg BID (recently increased from 500 mg BID) and plaquenil 200 mg BID   ESR 55, CRP 13.6  - C/w home Sulfasalazine and Plaquenil  - S/p 5 days 20 mg Prednisone and Prednisone taper  - Pain regimen: tylenol   - PT/OT  - Follow-up with rheumatologist (Dr. Johnston) on discharge.    #NSCLC metastatic to brain.   Dx with NSCLC with mets to brain on last admission, started CRT, last chemo being 09/07 at [facility] (pembrolizumab) and 3 rounds RT completed on previous admissions.   - Continue B12/folate supplementation   - Pt s/p 3 radiation treatments; repeat brain MRI   - F/u outpatient w/ oncologist     #Type 2 diabetes mellitus.   ·  Plan: A1C results: 7.4 last admission 07/2023; Repeat A1c 10 on 10/8  - Endo consult for final reccs  - Sugar control while on prednisone     #CKD (chronic kidney disease).   ·  Plan: baseline Cr 2, currently 1.01, voiding   - Trend BUN/Cr  - Avoid nephrotoxic meds.    #HTN (hypertension).   - C/w home meds: meds, amlodipine 10mg, losartan 100mg, Toprol 100mg        Patient was discharged to: home    New medications: Prednisone  Changes to old medications: NA  Medications that were stopped: NA    Items to follow up as outpatient: Follow up with rheumatologist and oncologist     Physical exam at the time of discharge:    General: in no acute distress  Lungs: CTA B/L. No wheezes, rales, or rhonchi  Cardiovascular: RRR. No murmurs, rubs, or gallops  Abdomen: Soft, non-tender non-distended; No rebound or guarding  Extremities: WWP, No clubbing, cyanosis or edema  MSK: ulnar deviation b/l  Neurological: Alert and oriented x3  Skin: Warm and dry. 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     #Rheumatoid arthritis flare.   ·  Plan: Intermittent flares, now with b/l joint, arm, and shoulder pain c/b inability to get out of bed today 2/2 pain likely 2/2 a reaction from chemotherapy on 9/07. Currently well controlled after steroids/tylenol.   Home meds: sulfasalazine and plaquenil; currently on sulfasalzine 1000 mg BID (recently increased from 500 mg BID) and plaquenil 200 mg BID   ESR 55, CRP 13.6  - C/w home Sulfasalazine and Plaquenil  - S/p 5 days 20 mg Prednisone and Prednisone taper  - C/w 5 mg prednisone  - Follow-up with rheumatologist (Dr. Johnston) on discharge.    #NSCLC metastatic to brain.   Dx with NSCLC with mets to brain on last admission, started CRT, [place] [facility] [place] and 3 rounds RT completed on previous admissions.   - Continue B12/folate supplementation   - Pt s/p 3 radiation treatments; repeat brain MRI   - F/u outpatient w/ oncologist     #Type 2 diabetes mellitus.   ·  Plan: A1C results: 7.4 last admission 07/2023; Repeat A1c 10 on 10/8  - Endo consult for final reccs  - Sugar control while on prednisone     #CKD (chronic kidney disease).   ·  Plan: baseline Cr 2, currently 1.01, voiding   - Trend BUN/Cr  - Avoid nephrotoxic meds.    #HTN (hypertension).   - C/w home meds: meds, amlodipine 10mg, losartan 100mg, Toprol 100mg        Patient was discharged to: home    New medications: Prednisone  Changes to old medications: NA  Medications that were stopped: NA    Items to follow up as outpatient: Follow up with rheumatologist and oncologist. Please follow up with PCP Dr. Douglass.    Physical exam at the time of discharge:    General: in no acute distress  Lungs: CTA B/L. No wheezes, rales, or rhonchi  Cardiovascular: RRR. No murmurs, rubs, or gallops  Abdomen: Soft, non-tender non-distended; No rebound or guarding  Extremities: WWP, No clubbing, cyanosis or edema  MSK: ulnar deviation b/l  Neurological: Alert and oriented x3  Skin: Warm and dry. 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     #Rheumatoid arthritis flare.   Intermittent flares, now with b/l joint, arm, and shoulder pain c/b inability to get out of bed today 2/2 pain likely 2/2 a reaction from chemotherapy on 9/07. Currently well controlled after steroids/tylenol.   Home meds: sulfasalazine and plaquenil; currently on sulfasalzine 1000 mg BID (recently increased from 500 mg BID) and plaquenil 200 mg BID   ESR 55, CRP 13.6  - C/w home Sulfasalazine and Plaquenil  - S/p 5 days 20 mg Prednisone and Prednisone taper  - C/w 5 mg prednisone  - Follow-up with rheumatologist (Dr. Johnston) on discharge.    #NSCLC metastatic to brain.   Dx with NSCLC with mets to brain on last admission, started CRT, last chemo being 09[place]07 at [facility] [place]) and 3 rounds RT completed on previous admissions.   - Continue B12/folate supplementation   - Pt s/p 3 radiation treatments; repeat brain MRI   - F/u outpatient w/ oncologist     #Type 2 diabetes mellitus.   A1C results: 7.4 last admission 07/2023; Repeat A1c 10 on 10/8  - Endo consult for final reccs  - Sugar control while on prednisone     #CKD (chronic kidney disease).   Baseline Cr 2, currently 1.01, voiding   - Trend BUN/Cr  - Avoid nephrotoxic meds.    #HTN (hypertension).   - C/w home meds: meds, amlodipine 10mg, losartan 100mg, Toprol 100mg        Patient was discharged to: home    New medications: Prednisone  Changes to old medications: NA  Medications that were stopped: NA    Items to follow up as outpatient: Follow up with rheumatologist and oncologist. Please follow up with PCP Dr. Douglass.    Physical exam at the time of discharge: home    General: in no acute distress  Lungs: CTA B/L. No wheezes, rales, or rhonchi  Cardiovascular: RRR. No murmurs, rubs, or gallops  Abdomen: Soft, non-tender non-distended; No rebound or guarding  Extremities: WWP, No clubbing, cyanosis or edema  MSK: ulnar deviation b/l  Neurological: Alert and oriented x3  Skin: Warm and dry. 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     #Rheumatoid arthritis flare.   Intermittent flares, now with b/l joint, arm, and shoulder pain c/b inability to get out of bed today 2/2 pain likely 2/2 a reaction from chemotherapy on 9/07. Currently well controlled after steroids/tylenol.   Home meds: sulfasalazine and plaquenil; currently on sulfasalzine 1000 mg BID (recently increased from 500 mg BID) and plaquenil 200 mg BID   ESR 55, CRP 13.6  - C/w home Sulfasalazine and Plaquenil  - S/p 5 days 20 mg Prednisone and Prednisone taper  - C/w 5 mg prednisone  - Follow-up with rheumatologist (Dr. Johnston) on discharge.    #NSCLC metastatic to brain.   Dx with NSCLC with mets to brain on last admission, started CRT, last chemo being 09/07 at [facility] [place] (pembrolizumab) and 3 rounds RT completed on previous admissions.   - Continue B12/folate supplementation   - Pt s/p 3 radiation treatments; repeat brain MRI   - F/u outpatient w/ oncologist     #Type 2 diabetes mellitus.   A1C results: 7.4 last admission 07/2023; Repeat A1c 10 on 10/8  - Endo consult for final reccs  - Sugar control while on prednisone     #CKD (chronic kidney disease).   Baseline Cr 2, currently 1.01, voiding   - Trend BUN/Cr  - Avoid nephrotoxic meds.    #HTN (hypertension).   - C/w home meds: meds, amlodipine 10mg, losartan 100mg, Toprol 100mg        Patient was discharged to: home    New medications: Prednisone  Changes to old medications: NA  Medications that were stopped: NA    Items to follow up as outpatient: Follow up with rheumatologist and oncologist. Please follow up with PCP Dr. Douglass.    Physical exam at the time of discharge: home    General: in no acute distress  Lungs: CTA B/L. No wheezes, rales, or rhonchi  Cardiovascular: RRR. No murmurs, rubs, or gallops  Abdomen: Soft, non-tender non-distended; No rebound or guarding  Extremities: WWP, No clubbing, cyanosis or edema  MSK: ulnar deviation b/l  Neurological: Alert and oriented x3  Skin: Warm and dry. No obvious rash

## 2023-10-09 NOTE — DIETITIAN INITIAL EVALUATION ADULT - PROBLEM SELECTOR PLAN 2
Dx with NSCLC with mets to brain on last admission, started CRT, last chemo being 09/07 at [facility], unknown agent per pt, last RT completed on previous admission 07/2023    - Continue B12/folate supplementation   -Rad onc consult in AM for consideration of continued RT while in patient

## 2023-10-09 NOTE — OCCUPATIONAL THERAPY INITIAL EVALUATION ADULT - LONG TERM MEMORY, REHAB EVAL
Note Text (......Xxx Chief Complaint.): This diagnosis correlates with the Other (Free Text): Pt reports ongoing acne breakouts, worse around periods. She is currently on ocp and tolerating well, discussed trying alternative and she declines due to hx of multiple reactions to other ocps. Render Risk Assessment In Note?: yes Detail Level: Detailed intact Other (Free Text): Declines treatment today

## 2023-10-09 NOTE — PROGRESS NOTE ADULT - PROBLEM SELECTOR PLAN 3
A1C results: 7.4 last admission 07/2023; endocrine consulted at that time deeming a finalized regimen of lispro 10 u before each meal; experienced nocturnal hypoglycemia and insulin regimen recently changed at [facility] given repeated hypoglycemia   BS >300  - Increase lispro to 5 TID and Lantus to 7  -iSS

## 2023-10-09 NOTE — DISCHARGE NOTE PROVIDER - NSDCFUSCHEDAPPT_GEN_ALL_CORE_FT
Anjana Johnston  [facility] Physician [facility]  RHEUM [address]  Scheduled Appointment: 10/25/2023    Marissa Casarez  [facility]  HEARTVASC [address]  Scheduled Appointment: 12/07/2023    [facility]  RHEUM [address]  Scheduled Appointment: 12/15/2023     Sedrick Delaney  [facility] Physician [facility]  HEMONC [address]  Scheduled Appointment: 10/19/2023    Anjana Johnston  [facility] Physician [facility]  RHEUM [address]  Scheduled Appointment: 10/25/2023    Marissa Casarez  [facility] Physician [facility]  HEARTVASC [address]  Scheduled Appointment: 12/07/2023    [facility] Physician [facility]  RHEUM [address]  Scheduled Appointment: 12/15/2023     Sedrick Delaney  [facility]  HEMONC [address]  Scheduled Appointment: 10/19/2023    Anjana Johnston  [facility]  RHEUM [address]  Scheduled Appointment: 10/25/2023    Marissa Douglass  [facility] Physician [facility]  INTMED [address]  Scheduled Appointment: 11/01/2023    Marissa Casarez  [facility] Physician [facility]  HEARTVASC [address]  Scheduled Appointment: 12/07/2023    [facility] Physician [facility]  RHEUM [address]  Scheduled Appointment: 12/15/2023

## 2023-10-09 NOTE — PROGRESS NOTE ADULT - SUBJECTIVE AND OBJECTIVE BOX
INTERVAL HPI/OVERNIGHT EVENTS:  Awake and feels stronger Was able to walk in hallway yesterday   WBC noted; Could be from the steroids ?  Glucoses noted      MEDICATIONS  (STANDING):  amLODIPine   Tablet 10 milliGRAM(s) Oral every 24 hours  aspirin  chewable 81 milliGRAM(s) Oral daily  atorvastatin 40 milliGRAM(s) Oral at bedtime  cyanocobalamin 1000 MICROGram(s) Oral daily  dextrose 5%. 1000 milliLiter(s) (50 mL/Hr) IV Continuous <Continuous>  dextrose 5%. 1000 milliLiter(s) (100 mL/Hr) IV Continuous <Continuous>  dextrose 50% Injectable 25 Gram(s) IV Push once  dextrose 50% Injectable 12.5 Gram(s) IV Push once  dextrose 50% Injectable 25 Gram(s) IV Push once  enoxaparin Injectable 40 milliGRAM(s) SubCutaneous every 24 hours  folic acid 1 milliGRAM(s) Oral daily  glucagon  Injectable 1 milliGRAM(s) IntraMuscular once  hydroxychloroquine 200 milliGRAM(s) Oral two times a day  influenza  Vaccine (HIGH DOSE) 0.7 milliLiter(s) IntraMuscular once  insulin glargine Injectable (LANTUS) 7 Unit(s) SubCutaneous at bedtime  insulin lispro (ADMELOG) corrective regimen sliding scale   SubCutaneous Before meals and at bedtime  insulin lispro Injectable (ADMELOG) 5 Unit(s) SubCutaneous three times a day before meals  losartan 100 milliGRAM(s) Oral every 24 hours  metoprolol succinate  milliGRAM(s) Oral daily  potassium phosphate IVPB 15 milliMole(s) IV Intermittent once  predniSONE   Tablet 20 milliGRAM(s) Oral daily  sulfaSALAzine Entabs 1000 milliGRAM(s) Oral every 12 hours    MEDICATIONS  (PRN):  acetaminophen     Tablet .. 650 milliGRAM(s) Oral every 6 hours PRN Temp greater or equal to 38C (100.4F), Mild Pain (1 - 3)  aluminum hydroxide/magnesium hydroxide/simethicone Suspension 30 milliLiter(s) Oral every 6 hours PRN Dyspepsia  dextrose Oral Gel 15 Gram(s) Oral once PRN Blood Glucose LESS THAN 70 milliGRAM(s)/deciliter  melatonin 3 milliGRAM(s) Oral at bedtime PRN Insomnia      Allergies    Bactrim (Other)    Intolerances        Vital Signs Last 24 Hrs  T(C): 36.4 (09 Oct 2023 05:59), Max: 36.4 (09 Oct 2023 00:10)  T(F): 97.6 (09 Oct 2023 05:59), Max: 97.6 (09 Oct 2023 00:10)  HR: 92 (09 Oct 2023 05:59) (81 - 101)  BP: 150/81 (09 Oct 2023 05:59) (147/88 - 161/77)  BP(mean): --  RR: 18 (09 Oct 2023 05:59) (17 - 18)  SpO2: 97% (09 Oct 2023 05:59) (95% - 97%)    Parameters below as of 09 Oct 2023 05:59  Patient On (Oxygen Delivery Method): room air              Constitutional:  Awake     Eyes: NEDRA    ENMT: Negative    Neck: Supple    Back:  no tenderness     Respiratory:  clear    Cardiovascular: S1 S2    Gastrointestinal:  soft     Genitourinary:    Extremities:  no edema     Vascular:    Neurological:    Skin:    Lymph Nodes:            LABS:                        10.9   19.72 )-----------( 271      ( 09 Oct 2023 05:30 )             33.6     10-09    132<L>  |  101  |  34<H>  ----------------------------<  271<H>  3.8   |  21<L>  |  1.14    Ca    8.6      09 Oct 2023 05:30  Phos  2.2     10-09  Mg     1.8     10-09    TPro  6.3  /  Alb  2.7<L>  /  TBili  0.2  /  DBili  x   /  AST  18  /  ALT  9<L>  /  AlkPhos  109  10-09      Urinalysis Basic - ( 09 Oct 2023 05:30 )    Color: x / Appearance: x / SG: x / pH: x  Gluc: 271 mg/dL / Ketone: x  / Bili: x / Urobili: x   Blood: x / Protein: x / Nitrite: x   Leuk Esterase: x / RBC: x / WBC x   Sq Epi: x / Non Sq Epi: x / Bacteria: x        RADIOLOGY & ADDITIONAL TESTS:

## 2023-10-10 LAB
ALBUMIN SERPL ELPH-MCNC: 3 G/DL — LOW (ref 3.3–5)
ALP SERPL-CCNC: 87 U/L — SIGNIFICANT CHANGE UP (ref 40–120)
ALT FLD-CCNC: 11 U/L — SIGNIFICANT CHANGE UP (ref 10–45)
ANION GAP SERPL CALC-SCNC: 11 MMOL/L — SIGNIFICANT CHANGE UP (ref 5–17)
ANISOCYTOSIS BLD QL: SLIGHT — SIGNIFICANT CHANGE UP
AST SERPL-CCNC: 18 U/L — SIGNIFICANT CHANGE UP (ref 10–40)
BASOPHILS # BLD AUTO: 0 K/UL — SIGNIFICANT CHANGE UP (ref 0–0.2)
BASOPHILS NFR BLD AUTO: 0 % — SIGNIFICANT CHANGE UP (ref 0–2)
BILIRUB SERPL-MCNC: 0.2 MG/DL — SIGNIFICANT CHANGE UP (ref 0.2–1.2)
BUN SERPL-MCNC: 29 MG/DL — HIGH (ref 7–23)
BURR CELLS BLD QL SMEAR: PRESENT — SIGNIFICANT CHANGE UP
CALCIUM SERPL-MCNC: 8.8 MG/DL — SIGNIFICANT CHANGE UP (ref 8.4–10.5)
CHLORIDE SERPL-SCNC: 101 MMOL/L — SIGNIFICANT CHANGE UP (ref 96–108)
CO2 SERPL-SCNC: 21 MMOL/L — LOW (ref 22–31)
CREAT SERPL-MCNC: 1.01 MG/DL — SIGNIFICANT CHANGE UP (ref 0.5–1.3)
EGFR: 58 ML/MIN/1.73M2 — LOW
EOSINOPHIL # BLD AUTO: 0 K/UL — SIGNIFICANT CHANGE UP (ref 0–0.5)
EOSINOPHIL NFR BLD AUTO: 0 % — SIGNIFICANT CHANGE UP (ref 0–6)
GIANT PLATELETS BLD QL SMEAR: PRESENT — SIGNIFICANT CHANGE UP
GLUCOSE BLDC GLUCOMTR-MCNC: 200 MG/DL — HIGH (ref 70–99)
GLUCOSE BLDC GLUCOMTR-MCNC: 200 MG/DL — HIGH (ref 70–99)
GLUCOSE BLDC GLUCOMTR-MCNC: 209 MG/DL — HIGH (ref 70–99)
GLUCOSE BLDC GLUCOMTR-MCNC: 360 MG/DL — HIGH (ref 70–99)
GLUCOSE BLDC GLUCOMTR-MCNC: 364 MG/DL — HIGH (ref 70–99)
GLUCOSE SERPL-MCNC: 192 MG/DL — HIGH (ref 70–99)
HCT VFR BLD CALC: 33.9 % — LOW (ref 34.5–45)
HGB BLD-MCNC: 10.9 G/DL — LOW (ref 11.5–15.5)
LYMPHOCYTES # BLD AUTO: 1.26 K/UL — SIGNIFICANT CHANGE UP (ref 1–3.3)
LYMPHOCYTES # BLD AUTO: 8.8 % — LOW (ref 13–44)
MACROCYTES BLD QL: SLIGHT — SIGNIFICANT CHANGE UP
MAGNESIUM SERPL-MCNC: 1.7 MG/DL — SIGNIFICANT CHANGE UP (ref 1.6–2.6)
MANUAL SMEAR VERIFICATION: SIGNIFICANT CHANGE UP
MCHC RBC-ENTMCNC: 29.5 PG — SIGNIFICANT CHANGE UP (ref 27–34)
MCHC RBC-ENTMCNC: 32.2 GM/DL — SIGNIFICANT CHANGE UP (ref 32–36)
MCV RBC AUTO: 91.6 FL — SIGNIFICANT CHANGE UP (ref 80–100)
METAMYELOCYTES # FLD: 3.5 % — HIGH (ref 0–0)
MONOCYTES # BLD AUTO: 0.63 K/UL — SIGNIFICANT CHANGE UP (ref 0–0.9)
MONOCYTES NFR BLD AUTO: 4.4 % — SIGNIFICANT CHANGE UP (ref 2–14)
NEUTROPHILS # BLD AUTO: 11.93 K/UL — HIGH (ref 1.8–7.4)
NEUTROPHILS NFR BLD AUTO: 83.3 % — HIGH (ref 43–77)
OVALOCYTES BLD QL SMEAR: SLIGHT — SIGNIFICANT CHANGE UP
PHOSPHATE SERPL-MCNC: 1.9 MG/DL — LOW (ref 2.5–4.5)
PLAT MORPH BLD: ABNORMAL
PLATELET # BLD AUTO: 285 K/UL — SIGNIFICANT CHANGE UP (ref 150–400)
POIKILOCYTOSIS BLD QL AUTO: SLIGHT — SIGNIFICANT CHANGE UP
POLYCHROMASIA BLD QL SMEAR: SLIGHT — SIGNIFICANT CHANGE UP
POTASSIUM SERPL-MCNC: 3.8 MMOL/L — SIGNIFICANT CHANGE UP (ref 3.5–5.3)
POTASSIUM SERPL-SCNC: 3.8 MMOL/L — SIGNIFICANT CHANGE UP (ref 3.5–5.3)
PROT SERPL-MCNC: 6.3 G/DL — SIGNIFICANT CHANGE UP (ref 6–8.3)
RBC # BLD: 3.7 M/UL — LOW (ref 3.8–5.2)
RBC # FLD: 14.2 % — SIGNIFICANT CHANGE UP (ref 10.3–14.5)
RBC BLD AUTO: ABNORMAL
SCHISTOCYTES BLD QL AUTO: SLIGHT — SIGNIFICANT CHANGE UP
SMUDGE CELLS # BLD: PRESENT — SIGNIFICANT CHANGE UP
SODIUM SERPL-SCNC: 133 MMOL/L — LOW (ref 135–145)
WBC # BLD: 14.32 K/UL — HIGH (ref 3.8–10.5)
WBC # FLD AUTO: 14.32 K/UL — HIGH (ref 3.8–10.5)

## 2023-10-10 PROCEDURE — 99232 SBSQ HOSP IP/OBS MODERATE 35: CPT | Mod: GC

## 2023-10-10 PROCEDURE — 70553 MRI BRAIN STEM W/O & W/DYE: CPT | Mod: 26

## 2023-10-10 RX ORDER — SENNA PLUS 8.6 MG/1
2 TABLET ORAL AT BEDTIME
Refills: 0 | Status: DISCONTINUED | OUTPATIENT
Start: 2023-10-10 | End: 2023-10-18

## 2023-10-10 RX ORDER — INSULIN LISPRO 100/ML
7 VIAL (ML) SUBCUTANEOUS
Refills: 0 | Status: DISCONTINUED | OUTPATIENT
Start: 2023-10-10 | End: 2023-10-11

## 2023-10-10 RX ORDER — INSULIN GLARGINE 100 [IU]/ML
12 INJECTION, SOLUTION SUBCUTANEOUS AT BEDTIME
Refills: 0 | Status: DISCONTINUED | OUTPATIENT
Start: 2023-10-10 | End: 2023-10-11

## 2023-10-10 RX ORDER — POLYETHYLENE GLYCOL 3350 17 G/17G
17 POWDER, FOR SOLUTION ORAL DAILY
Refills: 0 | Status: DISCONTINUED | OUTPATIENT
Start: 2023-10-10 | End: 2023-10-18

## 2023-10-10 RX ORDER — POTASSIUM PHOSPHATE, MONOBASIC POTASSIUM PHOSPHATE, DIBASIC 236; 224 MG/ML; MG/ML
15 INJECTION, SOLUTION INTRAVENOUS ONCE
Refills: 0 | Status: COMPLETED | OUTPATIENT
Start: 2023-10-10 | End: 2023-10-10

## 2023-10-10 RX ORDER — MAGNESIUM SULFATE 500 MG/ML
2 VIAL (ML) INJECTION ONCE
Refills: 0 | Status: COMPLETED | OUTPATIENT
Start: 2023-10-10 | End: 2023-10-10

## 2023-10-10 RX ADMIN — PREGABALIN 1000 MICROGRAM(S): 225 CAPSULE ORAL at 12:00

## 2023-10-10 RX ADMIN — Medication 5 UNIT(S): at 12:52

## 2023-10-10 RX ADMIN — POTASSIUM PHOSPHATE, MONOBASIC POTASSIUM PHOSPHATE, DIBASIC 62.5 MILLIMOLE(S): 236; 224 INJECTION, SOLUTION INTRAVENOUS at 14:30

## 2023-10-10 RX ADMIN — Medication 650 MILLIGRAM(S): at 09:31

## 2023-10-10 RX ADMIN — Medication 5: at 12:52

## 2023-10-10 RX ADMIN — POLYETHYLENE GLYCOL 3350 17 GRAM(S): 17 POWDER, FOR SOLUTION ORAL at 22:58

## 2023-10-10 RX ADMIN — AMLODIPINE BESYLATE 10 MILLIGRAM(S): 2.5 TABLET ORAL at 00:01

## 2023-10-10 RX ADMIN — Medication 25 GRAM(S): at 10:05

## 2023-10-10 RX ADMIN — Medication 2: at 09:31

## 2023-10-10 RX ADMIN — Medication 1 MILLIGRAM(S): at 11:59

## 2023-10-10 RX ADMIN — LOSARTAN POTASSIUM 100 MILLIGRAM(S): 100 TABLET, FILM COATED ORAL at 00:01

## 2023-10-10 RX ADMIN — Medication 5: at 17:54

## 2023-10-10 RX ADMIN — Medication 81 MILLIGRAM(S): at 11:59

## 2023-10-10 RX ADMIN — Medication 1000 MILLIGRAM(S): at 09:43

## 2023-10-10 RX ADMIN — Medication 7 UNIT(S): at 17:54

## 2023-10-10 RX ADMIN — Medication 5 UNIT(S): at 09:32

## 2023-10-10 RX ADMIN — Medication 20 MILLIGRAM(S): at 06:45

## 2023-10-10 RX ADMIN — Medication 100 MILLIGRAM(S): at 06:46

## 2023-10-10 RX ADMIN — Medication 650 MILLIGRAM(S): at 10:30

## 2023-10-10 RX ADMIN — Medication 200 MILLIGRAM(S): at 06:45

## 2023-10-10 RX ADMIN — Medication 200 MILLIGRAM(S): at 17:54

## 2023-10-10 RX ADMIN — Medication 1: at 22:54

## 2023-10-10 NOTE — PROGRESS NOTE ADULT - PROBLEM SELECTOR PLAN 2
Dx with NSCLC with mets to brain on last admission, started CRT, last chemo being 09/07 at [facility], unknown agent per pt, last RT completed on previous admission 07/2023    - Continue B12/folate supplementation  - Pt s/p 3 radiation treatments; no need for rad onc consult  - Spoke w/ outpatient physician Dr. Cathie Jeffrey ([phone]) stated pt is s/p one cycle of pembrolizumab in mid September and has been in and out of the hospital since. Believes the flares may be due to the chemo and will reassess the plan at her next appt.

## 2023-10-10 NOTE — PROGRESS NOTE ADULT - ASSESSMENT
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

## 2023-10-10 NOTE — PROGRESS NOTE ADULT - SUBJECTIVE AND OBJECTIVE BOX
OVERNIGHT EVENTS:  SUBJECTIVE: Patient was seen and examined at bedside.      VITAL SIGNS:  T(F): 97.4 (10-10-23 @ 06:19)  HR: 71 (10-10-23 @ 06:19)  BP: 129/80 (10-10-23 @ 06:19)  RR: 17 (10-10-23 @ 06:19)  SpO2: 98% (10-10-23 @ 06:19)  Wt(kg): --    PHYSICAL EXAM  GENERAL: NAD, lying in bed comfortably  HEAD:  Atraumatic, normocephalic  EYES: EOMI, PERRLA, conjunctiva and sclera clear  ENT: Moist mucous membranes  NECK: Supple, no JVD  HEART: Regular rate and rhythm, no murmurs, rubs, or gallops  LUNGS: Unlabored respirations.  Clear to auscultation bilaterally, no crackles, wheezing, or rhonchi  ABDOMEN: Soft, nontender, nondistended, +BS  EXTREMITIES: 2+ peripheral pulses bilaterally. No clubbing, cyanosis, or edema  NERVOUS SYSTEM:  A&Ox3, no focal deficits   SKIN: No rashes or lesions    MEDICATIONS  (STANDING):  amLODIPine   Tablet 10 milliGRAM(s) Oral every 24 hours  aspirin  chewable 81 milliGRAM(s) Oral daily  atorvastatin 40 milliGRAM(s) Oral at bedtime  cyanocobalamin 1000 MICROGram(s) Oral daily  dextrose 5%. 1000 milliLiter(s) (50 mL/Hr) IV Continuous <Continuous>  dextrose 5%. 1000 milliLiter(s) (100 mL/Hr) IV Continuous <Continuous>  dextrose 50% Injectable 25 Gram(s) IV Push once  dextrose 50% Injectable 25 Gram(s) IV Push once  dextrose 50% Injectable 12.5 Gram(s) IV Push once  dextrose 50% Injectable 25 Gram(s) IV Push once  enoxaparin Injectable 40 milliGRAM(s) SubCutaneous every 24 hours  folic acid 1 milliGRAM(s) Oral daily  glucagon  Injectable 1 milliGRAM(s) IntraMuscular once  hydroxychloroquine 200 milliGRAM(s) Oral two times a day  influenza  Vaccine (HIGH DOSE) 0.7 milliLiter(s) IntraMuscular once  insulin glargine Injectable (LANTUS) 7 Unit(s) SubCutaneous at bedtime  insulin lispro (ADMELOG) corrective regimen sliding scale   SubCutaneous Before meals and at bedtime  insulin lispro Injectable (ADMELOG) 5 Unit(s) SubCutaneous three times a day before meals  losartan 100 milliGRAM(s) Oral every 24 hours  metoprolol succinate  milliGRAM(s) Oral daily  predniSONE   Tablet 20 milliGRAM(s) Oral daily  sulfaSALAzine Entabs 1000 milliGRAM(s) Oral every 12 hours    MEDICATIONS  (PRN):  acetaminophen     Tablet .. 650 milliGRAM(s) Oral every 6 hours PRN Temp greater or equal to 38C (100.4F), Mild Pain (1 - 3)  aluminum hydroxide/magnesium hydroxide/simethicone Suspension 30 milliLiter(s) Oral every 6 hours PRN Dyspepsia  dextrose Oral Gel 15 Gram(s) Oral once PRN Blood Glucose LESS THAN 70 milliGRAM(s)/deciliter  melatonin 3 milliGRAM(s) Oral at bedtime PRN Insomnia      Allergies    Bactrim (Other)    Intolerances        LABS:                        10.9   19.72 )-----------( 271      ( 09 Oct 2023 05:30 )             33.6     10-09    132<L>  |  101  |  34<H>  ----------------------------<  271<H>  3.8   |  21<L>  |  1.14    Ca    8.6      09 Oct 2023 05:30  Phos  2.2     10-09  Mg     1.8     10-09    TPro  6.3  /  Alb  2.7<L>  /  TBili  0.2  /  DBili  x   /  AST  18  /  ALT  9<L>  /  AlkPhos  109  10-09      Urinalysis Basic - ( 09 Oct 2023 05:30 )    Color: x / Appearance: x / SG: x / pH: x  Gluc: 271 mg/dL / Ketone: x  / Bili: x / Urobili: x   Blood: x / Protein: x / Nitrite: x   Leuk Esterase: x / RBC: x / WBC x   Sq Epi: x / Non Sq Epi: x / Bacteria: x          MICROBIOLOGY      RADIOLOGY & ADDITIONAL TESTS:  Reviewed OVERNIGHT EVENTS: CYNDY  SUBJECTIVE: Patient was seen and examined at bedside. Pt reports joint pain in b/l shoulders, denies pain in other joints. Pt able to ambulate after steroids. She denies fever/chills, CP, SOB, N/V, abd pain, diarrhea, or dysuria. Last BM 2 days ago.     VITAL SIGNS:  T(F): 97.4 (10-10-23 @ 06:19)  HR: 71 (10-10-23 @ 06:19)  BP: 129/80 (10-10-23 @ 06:19)  RR: 17 (10-10-23 @ 06:19)  SpO2: 98% (10-10-23 @ 06:19)  Wt(kg): --    PHYSICAL EXAM  General: in no acute distress  Lungs: CTA B/L. No wheezes, rales, or rhonchi  Cardiovascular: RRR. No murmurs, rubs, or gallops  Abdomen: Soft, non-tender non-distended; No rebound or guarding  Extremities: WWP, No clubbing, cyanosis or edema  MSK: ulnar deviation b/l  Neurological: Alert and oriented x3  Skin: Warm and dry. No obvious rash     MEDICATIONS  (STANDING):  amLODIPine   Tablet 10 milliGRAM(s) Oral every 24 hours  aspirin  chewable 81 milliGRAM(s) Oral daily  atorvastatin 40 milliGRAM(s) Oral at bedtime  cyanocobalamin 1000 MICROGram(s) Oral daily  dextrose 5%. 1000 milliLiter(s) (50 mL/Hr) IV Continuous <Continuous>  dextrose 5%. 1000 milliLiter(s) (100 mL/Hr) IV Continuous <Continuous>  dextrose 50% Injectable 25 Gram(s) IV Push once  dextrose 50% Injectable 25 Gram(s) IV Push once  dextrose 50% Injectable 12.5 Gram(s) IV Push once  dextrose 50% Injectable 25 Gram(s) IV Push once  enoxaparin Injectable 40 milliGRAM(s) SubCutaneous every 24 hours  folic acid 1 milliGRAM(s) Oral daily  glucagon  Injectable 1 milliGRAM(s) IntraMuscular once  hydroxychloroquine 200 milliGRAM(s) Oral two times a day  influenza  Vaccine (HIGH DOSE) 0.7 milliLiter(s) IntraMuscular once  insulin glargine Injectable (LANTUS) 7 Unit(s) SubCutaneous at bedtime  insulin lispro (ADMELOG) corrective regimen sliding scale   SubCutaneous Before meals and at bedtime  insulin lispro Injectable (ADMELOG) 5 Unit(s) SubCutaneous three times a day before meals  losartan 100 milliGRAM(s) Oral every 24 hours  metoprolol succinate  milliGRAM(s) Oral daily  predniSONE   Tablet 20 milliGRAM(s) Oral daily  sulfaSALAzine Entabs 1000 milliGRAM(s) Oral every 12 hours    MEDICATIONS  (PRN):  acetaminophen     Tablet .. 650 milliGRAM(s) Oral every 6 hours PRN Temp greater or equal to 38C (100.4F), Mild Pain (1 - 3)  aluminum hydroxide/magnesium hydroxide/simethicone Suspension 30 milliLiter(s) Oral every 6 hours PRN Dyspepsia  dextrose Oral Gel 15 Gram(s) Oral once PRN Blood Glucose LESS THAN 70 milliGRAM(s)/deciliter  melatonin 3 milliGRAM(s) Oral at bedtime PRN Insomnia      Allergies    Bactrim (Other)    Intolerances        LABS:                        10.9   19.72 )-----------( 271      ( 09 Oct 2023 05:30 )             33.6     10-09    132<L>  |  101  |  34<H>  ----------------------------<  271<H>  3.8   |  21<L>  |  1.14    Ca    8.6      09 Oct 2023 05:30  Phos  2.2     10-09  Mg     1.8     10-09    TPro  6.3  /  Alb  2.7<L>  /  TBili  0.2  /  DBili  x   /  AST  18  /  ALT  9<L>  /  AlkPhos  109  10-09      Urinalysis Basic - ( 09 Oct 2023 05:30 )    Color: x / Appearance: x / SG: x / pH: x  Gluc: 271 mg/dL / Ketone: x  / Bili: x / Urobili: x   Blood: x / Protein: x / Nitrite: x   Leuk Esterase: x / RBC: x / WBC x   Sq Epi: x / Non Sq Epi: x / Bacteria: x          MICROBIOLOGY      RADIOLOGY & ADDITIONAL TESTS:  Reviewed

## 2023-10-10 NOTE — PROGRESS NOTE ADULT - PROBLEM SELECTOR PLAN 3
A1C results: 7.4 last admission 07/2023; endocrine consulted at that time deeming a finalized regimen of lispro 10 u before each meal; experienced nocturnal hypoglycemia and insulin regimen recently changed at [facility] given repeated hypoglycemia   BS >300  - Increase lispro to 5 TID and Lantus to 7  - iSS

## 2023-10-10 NOTE — PROGRESS NOTE ADULT - SUBJECTIVE AND OBJECTIVE BOX
INTERVAL HPI/OVERNIGHT EVENTS:  Interim reviewed ;  Muscle weakness may be from chemo   Will need alternative medication   She continues to want to be followed at [facility]   Discussed with patients daughter ; She understands she will need help at home   MRI to be done of Brain       MEDICATIONS  (STANDING):  amLODIPine   Tablet 10 milliGRAM(s) Oral every 24 hours  aspirin  chewable 81 milliGRAM(s) Oral daily  atorvastatin 40 milliGRAM(s) Oral at bedtime  cyanocobalamin 1000 MICROGram(s) Oral daily  dextrose 5%. 1000 milliLiter(s) (50 mL/Hr) IV Continuous <Continuous>  dextrose 5%. 1000 milliLiter(s) (100 mL/Hr) IV Continuous <Continuous>  dextrose 50% Injectable 25 Gram(s) IV Push once  dextrose 50% Injectable 12.5 Gram(s) IV Push once  dextrose 50% Injectable 25 Gram(s) IV Push once  dextrose 50% Injectable 25 Gram(s) IV Push once  enoxaparin Injectable 40 milliGRAM(s) SubCutaneous every 24 hours  folic acid 1 milliGRAM(s) Oral daily  glucagon  Injectable 1 milliGRAM(s) IntraMuscular once  hydroxychloroquine 200 milliGRAM(s) Oral two times a day  influenza  Vaccine (HIGH DOSE) 0.7 milliLiter(s) IntraMuscular once  insulin glargine Injectable (LANTUS) 7 Unit(s) SubCutaneous at bedtime  insulin lispro (ADMELOG) corrective regimen sliding scale   SubCutaneous Before meals and at bedtime  insulin lispro Injectable (ADMELOG) 5 Unit(s) SubCutaneous three times a day before meals  losartan 100 milliGRAM(s) Oral every 24 hours  metoprolol succinate  milliGRAM(s) Oral daily  predniSONE   Tablet 20 milliGRAM(s) Oral daily  sulfaSALAzine Entabs 1000 milliGRAM(s) Oral every 12 hours    MEDICATIONS  (PRN):  acetaminophen     Tablet .. 650 milliGRAM(s) Oral every 6 hours PRN Temp greater or equal to 38C (100.4F), Mild Pain (1 - 3)  aluminum hydroxide/magnesium hydroxide/simethicone Suspension 30 milliLiter(s) Oral every 6 hours PRN Dyspepsia  dextrose Oral Gel 15 Gram(s) Oral once PRN Blood Glucose LESS THAN 70 milliGRAM(s)/deciliter  melatonin 3 milliGRAM(s) Oral at bedtime PRN Insomnia      Allergies    Bactrim (Other)    Intolerances        Vital Signs Last 24 Hrs  T(C): 36.7 (10 Oct 2023 09:42), Max: 36.9 (09 Oct 2023 15:28)  T(F): 98.1 (10 Oct 2023 09:42), Max: 98.4 (09 Oct 2023 15:28)  HR: 78 (10 Oct 2023 09:42) (71 - 83)  BP: 152/88 (10 Oct 2023 09:42) (129/80 - 156/83)  BP(mean): --  RR: 17 (10 Oct 2023 09:42) (17 - 18)  SpO2: 96% (10 Oct 2023 09:42) (95% - 98%)    Parameters below as of 10 Oct 2023 09:42  Patient On (Oxygen Delivery Method): room air              Constitutional: Awake and alert     Eyes: NEDRA    ENMT: Negative    Neck: Supple    Back:  no tenderness     Respiratory:  clear     Cardiovascular: S1 S2    Gastrointestinal: soft     Genitourinary:    Extremities: no edema    Vascular:    Neurological:    Skin:    Lymph Nodes:            LABS:                        10.9   14.32 )-----------( 285      ( 10 Oct 2023 05:30 )             33.9     10-10    133<L>  |  101  |  29<H>  ----------------------------<  192<H>  3.8   |  21<L>  |  1.01    Ca    8.8      10 Oct 2023 05:30  Phos  1.9     10-10  Mg     1.7     10-10    TPro  6.3  /  Alb  3.0<L>  /  TBili  0.2  /  DBili  x   /  AST  18  /  ALT  11  /  AlkPhos  87  10-10      Urinalysis Basic - ( 10 Oct 2023 05:30 )    Color: x / Appearance: x / SG: x / pH: x  Gluc: 192 mg/dL / Ketone: x  / Bili: x / Urobili: x   Blood: x / Protein: x / Nitrite: x   Leuk Esterase: x / RBC: x / WBC x   Sq Epi: x / Non Sq Epi: x / Bacteria: x        RADIOLOGY & ADDITIONAL TESTS:

## 2023-10-11 LAB
GLUCOSE BLDC GLUCOMTR-MCNC: 154 MG/DL — HIGH (ref 70–99)
GLUCOSE BLDC GLUCOMTR-MCNC: 242 MG/DL — HIGH (ref 70–99)
GLUCOSE BLDC GLUCOMTR-MCNC: 302 MG/DL — HIGH (ref 70–99)
GLUCOSE BLDC GLUCOMTR-MCNC: 354 MG/DL — HIGH (ref 70–99)

## 2023-10-11 PROCEDURE — 99233 SBSQ HOSP IP/OBS HIGH 50: CPT | Mod: GC

## 2023-10-11 PROCEDURE — 99223 1ST HOSP IP/OBS HIGH 75: CPT

## 2023-10-11 RX ORDER — SULFADIAZINE
1000 POWDER (GRAM) MISCELLANEOUS EVERY 24 HOURS
Refills: 0 | Status: DISCONTINUED | OUTPATIENT
Start: 2023-10-11 | End: 2023-10-11

## 2023-10-11 RX ORDER — INSULIN GLARGINE 100 [IU]/ML
13 INJECTION, SOLUTION SUBCUTANEOUS AT BEDTIME
Refills: 0 | Status: DISCONTINUED | OUTPATIENT
Start: 2023-10-11 | End: 2023-10-11

## 2023-10-11 RX ORDER — INSULIN LISPRO 100/ML
8 VIAL (ML) SUBCUTANEOUS ONCE
Refills: 0 | Status: COMPLETED | OUTPATIENT
Start: 2023-10-11 | End: 2023-10-11

## 2023-10-11 RX ORDER — INSULIN GLARGINE 100 [IU]/ML
14 INJECTION, SOLUTION SUBCUTANEOUS AT BEDTIME
Refills: 0 | Status: DISCONTINUED | OUTPATIENT
Start: 2023-10-11 | End: 2023-10-12

## 2023-10-11 RX ORDER — INSULIN LISPRO 100/ML
8 VIAL (ML) SUBCUTANEOUS
Refills: 0 | Status: DISCONTINUED | OUTPATIENT
Start: 2023-10-11 | End: 2023-10-12

## 2023-10-11 RX ADMIN — AMLODIPINE BESYLATE 10 MILLIGRAM(S): 2.5 TABLET ORAL at 22:48

## 2023-10-11 RX ADMIN — SENNA PLUS 2 TABLET(S): 8.6 TABLET ORAL at 22:48

## 2023-10-11 RX ADMIN — Medication 7 UNIT(S): at 09:50

## 2023-10-11 RX ADMIN — Medication 5: at 18:58

## 2023-10-11 RX ADMIN — Medication 1 MILLIGRAM(S): at 11:46

## 2023-10-11 RX ADMIN — Medication 100 MILLIGRAM(S): at 06:02

## 2023-10-11 RX ADMIN — PREGABALIN 1000 MICROGRAM(S): 225 CAPSULE ORAL at 11:46

## 2023-10-11 RX ADMIN — Medication 8 UNIT(S): at 19:00

## 2023-10-11 RX ADMIN — Medication 650 MILLIGRAM(S): at 18:19

## 2023-10-11 RX ADMIN — Medication 81 MILLIGRAM(S): at 11:46

## 2023-10-11 RX ADMIN — Medication 2: at 09:49

## 2023-10-11 RX ADMIN — Medication 100 UNIT(S): at 10:08

## 2023-10-11 RX ADMIN — Medication 20 MILLIGRAM(S): at 06:01

## 2023-10-11 RX ADMIN — ENOXAPARIN SODIUM 40 MILLIGRAM(S): 100 INJECTION SUBCUTANEOUS at 22:47

## 2023-10-11 RX ADMIN — Medication 650 MILLIGRAM(S): at 17:12

## 2023-10-11 RX ADMIN — Medication 200 MILLIGRAM(S): at 17:59

## 2023-10-11 RX ADMIN — Medication 650 MILLIGRAM(S): at 00:37

## 2023-10-11 RX ADMIN — ATORVASTATIN CALCIUM 40 MILLIGRAM(S): 80 TABLET, FILM COATED ORAL at 22:48

## 2023-10-11 RX ADMIN — LOSARTAN POTASSIUM 100 MILLIGRAM(S): 100 TABLET, FILM COATED ORAL at 22:48

## 2023-10-11 RX ADMIN — Medication 1: at 22:46

## 2023-10-11 RX ADMIN — Medication 200 MILLIGRAM(S): at 06:02

## 2023-10-11 RX ADMIN — INSULIN GLARGINE 14 UNIT(S): 100 INJECTION, SOLUTION SUBCUTANEOUS at 22:47

## 2023-10-11 RX ADMIN — Medication 8 UNIT(S): at 13:42

## 2023-10-11 NOTE — PROGRESS NOTE ADULT - SUBJECTIVE AND OBJECTIVE BOX
INTERVAL HPI/OVERNIGHT EVENTS:  Interim reviewed   Strength remains improved;  Wants to transfer her oncologic care at [place] : Spoke with oncology   MRI of Brain done and results pending  Will need help at home; Medicaid application pending   Glucoses remain elevated       MEDICATIONS  (STANDING):  amLODIPine   Tablet 10 milliGRAM(s) Oral every 24 hours  aspirin  chewable 81 milliGRAM(s) Oral daily  atorvastatin 40 milliGRAM(s) Oral at bedtime  cyanocobalamin 1000 MICROGram(s) Oral daily  dextrose 5%. 1000 milliLiter(s) (50 mL/Hr) IV Continuous <Continuous>  dextrose 5%. 1000 milliLiter(s) (100 mL/Hr) IV Continuous <Continuous>  dextrose 50% Injectable 25 Gram(s) IV Push once  dextrose 50% Injectable 25 Gram(s) IV Push once  dextrose 50% Injectable 12.5 Gram(s) IV Push once  dextrose 50% Injectable 25 Gram(s) IV Push once  enoxaparin Injectable 40 milliGRAM(s) SubCutaneous every 24 hours  folic acid 1 milliGRAM(s) Oral daily  glucagon  Injectable 1 milliGRAM(s) IntraMuscular once  hydroxychloroquine 200 milliGRAM(s) Oral two times a day  influenza  Vaccine (HIGH DOSE) 0.7 milliLiter(s) IntraMuscular once  insulin glargine Injectable (LANTUS) 12 Unit(s) SubCutaneous at bedtime  insulin lispro (ADMELOG) corrective regimen sliding scale   SubCutaneous Before meals and at bedtime  insulin lispro Injectable (ADMELOG) 7 Unit(s) SubCutaneous three times a day before meals  losartan 100 milliGRAM(s) Oral every 24 hours  metoprolol succinate  milliGRAM(s) Oral daily  polyethylene glycol 3350 17 Gram(s) Oral daily  predniSONE   Tablet 20 milliGRAM(s) Oral daily  senna 2 Tablet(s) Oral at bedtime  SULFASALAZINE 50 DELAYED RELEASE TABLETS 2 Tablet(s) 2 Tablet(s) Oral every 12 hours    MEDICATIONS  (PRN):  acetaminophen     Tablet .. 650 milliGRAM(s) Oral every 6 hours PRN Temp greater or equal to 38C (100.4F), Mild Pain (1 - 3)  aluminum hydroxide/magnesium hydroxide/simethicone Suspension 30 milliLiter(s) Oral every 6 hours PRN Dyspepsia  dextrose Oral Gel 15 Gram(s) Oral once PRN Blood Glucose LESS THAN 70 milliGRAM(s)/deciliter  melatonin 3 milliGRAM(s) Oral at bedtime PRN Insomnia      Allergies    Bactrim (Other)    Intolerances        Vital Signs Last 24 Hrs  T(C): 37.1 (11 Oct 2023 09:00), Max: 37.1 (11 Oct 2023 09:00)  T(F): 98.7 (11 Oct 2023 09:00), Max: 98.7 (11 Oct 2023 09:00)  HR: 74 (11 Oct 2023 09:00) (60 - 78)  BP: 136/71 (11 Oct 2023 09:00) (118/71 - 151/72)  BP(mean): --  RR: 18 (11 Oct 2023 09:00) (17 - 18)  SpO2: 97% (11 Oct 2023 09:00) (94% - 98%)    Parameters below as of 11 Oct 2023 09:00  Patient On (Oxygen Delivery Method): room air              Constitutional:  Awake    Eyes: NEDRA    ENMT: Negative    Neck: Supple    Back:  no tenderness     Respiratory:  clear     Cardiovascular: S1 S2    Gastrointestinal: soft     Genitourinary:    Extremities:  no edema     Vascular:    Neurological: strength improved     Skin:    Lymph Nodes:            10-10 @ 07:01  -  10-11 @ 07:00  --------------------------------------------------------  IN: 0 mL / OUT: 600 mL / NET: -600 mL      LABS:                        10.9   14.32 )-----------( 285      ( 10 Oct 2023 05:30 )             33.9     10-10    133<L>  |  101  |  29<H>  ----------------------------<  192<H>  3.8   |  21<L>  |  1.01    Ca    8.8      10 Oct 2023 05:30  Phos  1.9     10-10  Mg     1.7     10-10    TPro  6.3  /  Alb  3.0<L>  /  TBili  0.2  /  DBili  x   /  AST  18  /  ALT  11  /  AlkPhos  87  10-10      Urinalysis Basic - ( 10 Oct 2023 05:30 )    Color: x / Appearance: x / SG: x / pH: x  Gluc: 192 mg/dL / Ketone: x  / Bili: x / Urobili: x   Blood: x / Protein: x / Nitrite: x   Leuk Esterase: x / RBC: x / WBC x   Sq Epi: x / Non Sq Epi: x / Bacteria: x        RADIOLOGY & ADDITIONAL TESTS:

## 2023-10-11 NOTE — PROGRESS NOTE ADULT - PROBLEM SELECTOR PLAN 3
A1C results: 7.4 last admission 07/2023; endocrine consulted at that time deeming a finalized regimen of lispro 10 u before each meal; experienced nocturnal hypoglycemia and insulin regimen recently changed at [facility] [place], operated by [facility] given repeated hypoglycemia   BS >300  - Increase lispro to 5 TID and increased Lantus to 10  - iSS A1C results: 7.4 last admission 07/2023; endocrine consulted at that time deeming a finalized regimen of lispro 10 u before each meal; experienced nocturnal hypoglycemia and insulin regimen recently changed at [facility] given repeated hypoglycemia   BS >300  - Increase lispro to 6 TID and increased Lantus to 11  - iSS A1C results: 7.4 last admission 07/2023; endocrine consulted at that time deeming a finalized regimen of lispro 10 u before each meal; experienced nocturnal hypoglycemia and insulin regimen recently changed at [facility] given repeated hypoglycemia   BS >300  - Increase lispro to 8 TID and increased Lantus to 14  - iSS

## 2023-10-11 NOTE — CONSULT NOTE ADULT - ATTENDING COMMENTS
Seen with .    NSCLC with BMs, s/p 1 cycle carbo/Taxol/pembro at [facility].  She is here for RA flare.  She would like to transfer care to [facility].  She can see me in office after dc.  Taper prednisone to 10 mg, and if tolerating well, taper to 5 and then stop.    Total time spent on encounter, including but not limited to counseling/coordination of care: 75 mis.

## 2023-10-11 NOTE — CONSULT NOTE ADULT - SUBJECTIVE AND OBJECTIVE BOX
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 Pt has history of similar pain caused by RA. Reports taking sulfasalazine and Plaquenil, Tylenol for pain. No NSAID use d/t CKD. She was recently admitted to [facility] for the pain and her sulfasalazine dose was increased to 1000 mg BID. She has been having an RA flare for the past 2 weeks, similar to prior flares but more severe, unable to get out of bed this morning, and called Dr. Douglass who advised her to present to the ED. She reports starting chemotherapy at [facility] on 09/07 (unsure of agent) and was planned for 2nd session 3 weeks later, but fell after getting up from bed, prompting admission to [facility]. Denies SOB, cough, fever, chills, headache, vision changes, confusion.     ED   VS: T 98.4, HR 88, /93, RR 16, 02 97% on RA  Labs: WBC 11.5, Hgb 11.4, Glucose 164, Na 136, K 3.8,   Imaging: None   Interventions: Prednisone 60 mg, Ofirmev 1 g   (07 Oct 2023 22:57)    SUBJECTIVE: Patient seen and examined at bedside.    OBJECTIVE:    VITAL SIGNS:  ICU Vital Signs Last 24 Hrs  T(C): 37.1 (11 Oct 2023 09:00), Max: 37.1 (11 Oct 2023 09:00)  T(F): 98.7 (11 Oct 2023 09:00), Max: 98.7 (11 Oct 2023 09:00)  HR: 74 (11 Oct 2023 09:00) (60 - 78)  BP: 136/71 (11 Oct 2023 09:00) (118/71 - 151/72)  BP(mean): --  ABP: --  ABP(mean): --  RR: 18 (11 Oct 2023 09:00) (17 - 18)  SpO2: 97% (11 Oct 2023 09:00) (94% - 98%)    O2 Parameters below as of 11 Oct 2023 09:00  Patient On (Oxygen Delivery Method): room air              10-10 @ 07:01  -  10-11 @ 07:00  --------------------------------------------------------  IN: 0 mL / OUT: 600 mL / NET: -600 mL      CAPILLARY BLOOD GLUCOSE      POCT Blood Glucose.: 242 mg/dL (11 Oct 2023 09:22)      PHYSICAL EXAM:  General: NAD, answering questions, pleasantly conversant  HEENT: NC/AT; PERRL, clear conjunctiva  Neck: supple  Respiratory: CTA b/l  Cardiovascular: +S1/S2; RRR  Abdomen: soft, NT/ND; +BS x4  Extremities: WWP, 2+ peripheral pulses b/l; no LE edema  Skin: normal color and turgor; no rash  Neurological:     MEDICATIONS:  MEDICATIONS  (STANDING):  amLODIPine   Tablet 10 milliGRAM(s) Oral every 24 hours  aspirin  chewable 81 milliGRAM(s) Oral daily  atorvastatin 40 milliGRAM(s) Oral at bedtime  cyanocobalamin 1000 MICROGram(s) Oral daily  dextrose 5%. 1000 milliLiter(s) (50 mL/Hr) IV Continuous <Continuous>  dextrose 5%. 1000 milliLiter(s) (100 mL/Hr) IV Continuous <Continuous>  dextrose 50% Injectable 25 Gram(s) IV Push once  dextrose 50% Injectable 12.5 Gram(s) IV Push once  dextrose 50% Injectable 25 Gram(s) IV Push once  dextrose 50% Injectable 25 Gram(s) IV Push once  enoxaparin Injectable 40 milliGRAM(s) SubCutaneous every 24 hours  folic acid 1 milliGRAM(s) Oral daily  glucagon  Injectable 1 milliGRAM(s) IntraMuscular once  hydroxychloroquine 200 milliGRAM(s) Oral two times a day  influenza  Vaccine (HIGH DOSE) 0.7 milliLiter(s) IntraMuscular once  insulin glargine Injectable (LANTUS) 12 Unit(s) SubCutaneous at bedtime  insulin lispro (ADMELOG) corrective regimen sliding scale   SubCutaneous Before meals and at bedtime  insulin lispro Injectable (ADMELOG) 7 Unit(s) SubCutaneous three times a day before meals  losartan 100 milliGRAM(s) Oral every 24 hours  metoprolol succinate  milliGRAM(s) Oral daily  polyethylene glycol 3350 17 Gram(s) Oral daily  predniSONE   Tablet 20 milliGRAM(s) Oral daily  senna 2 Tablet(s) Oral at bedtime  SULFASALAZINE 50 DELAYED RELEASE TABLETS 2 Tablet(s) 2 Tablet(s) Oral every 12 hours    MEDICATIONS  (PRN):  acetaminophen     Tablet .. 650 milliGRAM(s) Oral every 6 hours PRN Temp greater or equal to 38C (100.4F), Mild Pain (1 - 3)  aluminum hydroxide/magnesium hydroxide/simethicone Suspension 30 milliLiter(s) Oral every 6 hours PRN Dyspepsia  dextrose Oral Gel 15 Gram(s) Oral once PRN Blood Glucose LESS THAN 70 milliGRAM(s)/deciliter  melatonin 3 milliGRAM(s) Oral at bedtime PRN Insomnia      MEDICAL/SURGICAL Hx:  PAST MEDICAL & SURGICAL HISTORY:  HTN (hypertension)      HLD (hyperlipidemia)      DM (diabetes mellitus), type 2      Rheumatoid arthritis      Stage 4 chronic kidney disease      Degenerative joint disease (DJD) of lumbar spine      Osteopenia      S/P total right hip arthroplasty          ALLERGIES:  Allergies    Bactrim (Other)    Intolerances        LABS:                        10.9   14.32 )-----------( 285      ( 10 Oct 2023 05:30 )             33.9     10-10    133<L>  |  101  |  29<H>  ----------------------------<  192<H>  3.8   |  21<L>  |  1.01    Ca    8.8      10 Oct 2023 05:30  Phos  1.9     10-10  Mg     1.7     10-10    TPro  6.3  /  Alb  3.0<L>  /  TBili  0.2  /  DBili  x   /  AST  18  /  ALT  11  /  AlkPhos  87  10-10      Urinalysis Basic - ( 10 Oct 2023 05:30 )    Color: x / Appearance: x / SG: x / pH: x  Gluc: 192 mg/dL / Ketone: x  / Bili: x / Urobili: x   Blood: x / Protein: x / Nitrite: x   Leuk Esterase: x / RBC: x / WBC x   Sq Epi: x / Non Sq Epi: x / Bacteria: x            RADIOLOGY, PATHOLOGY, & ADDITIONAL TESTS: Reviewed.

## 2023-10-11 NOTE — PROVIDER CONTACT NOTE (OTHER) - BACKGROUND
pt seen at [facility] but there no date of patient's admission there. pt states that the port was accessed on that admission and she does not remember the date.
As per the patient it has been in for five weeks, Dr South is going to follow up on this and see if the patient needs this for continued Chemotherapy.

## 2023-10-11 NOTE — PROGRESS NOTE ADULT - ASSESSMENT
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

## 2023-10-11 NOTE — PROVIDER CONTACT NOTE (OTHER) - SITUATION
pt with R chest port accessed prior to admission to [facility].
Patient admitted to 4613-01 with accessed MediPort. This information was not given on report from ED not was there any documentation of the accessed Mediport.

## 2023-10-11 NOTE — PROVIDER CONTACT NOTE (OTHER) - ACTION/TREATMENT ORDERED:
port flushed with heparin and de-accessed as per protocol.
Dr. South was asked to place a miscellaneous nursing order stating the port was there on admission and not to use it. And please put an order in to have it removed if it is not needed.

## 2023-10-11 NOTE — PROGRESS NOTE ADULT - PROBLEM SELECTOR PLAN 1
Intermittent flares, now with b/l joint, arm, and shoulder pain c/b inability to get out of bed today 2/2 pain. Currently well controlled after steroids/tylenol. Home meds: sulfasalazine and plaquenil; currently on sulfasalzine 1000 mg BID (recently increased from 500 mg BID) and plaquenil 200 mg BID   ESR 55, CRP 13.6  - C/w home Sulfasalazine and Plaquenil  - C/w 5 days 20 mg prednisone (last day 10/12)  - Pain regimen: tylenol   - PT/OT Intermittent flares, now with b/l joint, arm, and shoulder pain c/b inability to get out of bed today 2/2 pain. Currently well controlled after steroids/tylenol. Home meds: sulfasalazine and plaquenil; currently on sulfasalzine 1000 mg BID (recently increased from 500 mg BID) and plaquenil 200 mg BID   ESR 55, CRP 13.6  - C/w home Sulfasalazine and Plaquenil  - C/w 5 days 20 mg prednisone (last day 10/12)  - Pain regimen: Tylenol   - PT/OT

## 2023-10-11 NOTE — PROGRESS NOTE ADULT - PROBLEM SELECTOR PLAN 2
Dx with NSCLC with mets to brain on last admission, started CRT, last chemo being 09/07 at [facility] [place], unknown agent per pt, last RT completed on previous admission 07/2023    - Continue B12/folate supplementation  - Pt s/p 3 radiation treatments; no need for rad onc consult  - Spoke w/ outpatient physician Dr. Cathie Jeffrey ([phone]) stated pt is s/p one cycle of pembrolizumab in mid September and has been in and out of the hospital since. Believes the flares may be due to the chemo and will reassess the plan at her next appt.

## 2023-10-11 NOTE — PROGRESS NOTE ADULT - SUBJECTIVE AND OBJECTIVE BOX
Internal Medicine Progress Note  Marylin Charlton, PGY-1  Pager: [phone]    ******INCOMPLETE******    Patient is a 73y old  Female who presents with a chief complaint of RA flare (10 Oct 2023 11:00)    OVERNIGHT EVENTS/INTERVAL HPI:    REVIEW OF SYSTEMS:  All other review of systems is negative unless indicated above.    OBJECTIVE:  T(C): 36.8 (10-11-23 @ 05:12), Max: 36.8 (10-10-23 @ 15:19)  HR: 60 (10-11-23 @ 05:12) (60 - 78)  BP: 142/83 (10-11-23 @ 05:12) (118/71 - 152/88)  RR: 17 (10-11-23 @ 05:12) (17 - 18)  SpO2: 98% (10-11-23 @ 05:12) (94% - 98%)  Daily     Daily     Physical Exam:  General: in no acute distress  Eyes: EOMI intact bilaterally. Anicteric sclerae, moist conjunctivae  HENT: Moist mucous membranes  Neck: Trachea midline, supple  Lungs: CTA B/L. No wheezes, rales, or rhonchi  Cardiovascular: RRR. No murmurs, rubs, or gallops  Abdomen: Soft, non-tender non-distended; No rebound or guarding  Extremities: WWP, No clubbing, cyanosis or edema  MSK: No midline bony tenderness. No CVA tenderness bilaterally  Neurological: Alert and oriented x3  Skin: Warm and dry. No obvious rash     Medications:  MEDICATIONS  (STANDING):  amLODIPine   Tablet 10 milliGRAM(s) Oral every 24 hours  aspirin  chewable 81 milliGRAM(s) Oral daily  atorvastatin 40 milliGRAM(s) Oral at bedtime  cyanocobalamin 1000 MICROGram(s) Oral daily  dextrose 5%. 1000 milliLiter(s) (50 mL/Hr) IV Continuous <Continuous>  dextrose 5%. 1000 milliLiter(s) (100 mL/Hr) IV Continuous <Continuous>  dextrose 50% Injectable 25 Gram(s) IV Push once  dextrose 50% Injectable 25 Gram(s) IV Push once  dextrose 50% Injectable 12.5 Gram(s) IV Push once  dextrose 50% Injectable 25 Gram(s) IV Push once  enoxaparin Injectable 40 milliGRAM(s) SubCutaneous every 24 hours  folic acid 1 milliGRAM(s) Oral daily  glucagon  Injectable 1 milliGRAM(s) IntraMuscular once  hydroxychloroquine 200 milliGRAM(s) Oral two times a day  influenza  Vaccine (HIGH DOSE) 0.7 milliLiter(s) IntraMuscular once  insulin glargine Injectable (LANTUS) 12 Unit(s) SubCutaneous at bedtime  insulin lispro (ADMELOG) corrective regimen sliding scale   SubCutaneous Before meals and at bedtime  insulin lispro Injectable (ADMELOG) 7 Unit(s) SubCutaneous three times a day before meals  losartan 100 milliGRAM(s) Oral every 24 hours  metoprolol succinate  milliGRAM(s) Oral daily  polyethylene glycol 3350 17 Gram(s) Oral daily  predniSONE   Tablet 20 milliGRAM(s) Oral daily  senna 2 Tablet(s) Oral at bedtime  sulfaSALAzine Entabs 1000 milliGRAM(s) Oral every 12 hours    MEDICATIONS  (PRN):  acetaminophen     Tablet .. 650 milliGRAM(s) Oral every 6 hours PRN Temp greater or equal to 38C (100.4F), Mild Pain (1 - 3)  aluminum hydroxide/magnesium hydroxide/simethicone Suspension 30 milliLiter(s) Oral every 6 hours PRN Dyspepsia  dextrose Oral Gel 15 Gram(s) Oral once PRN Blood Glucose LESS THAN 70 milliGRAM(s)/deciliter  melatonin 3 milliGRAM(s) Oral at bedtime PRN Insomnia      Labs:                        10.9   14.32 )-----------( 285      ( 10 Oct 2023 05:30 )             33.9     10-10    133<L>  |  101  |  29<H>  ----------------------------<  192<H>  3.8   |  21<L>  |  1.01    Ca    8.8      10 Oct 2023 05:30  Phos  1.9     10-10  Mg     1.7     10-10    TPro  6.3  /  Alb  3.0<L>  /  TBili  0.2  /  DBili  x   /  AST  18  /  ALT  11  /  AlkPhos  87  10-10      Urinalysis Basic - ( 10 Oct 2023 05:30 )    Color: x / Appearance: x / SG: x / pH: x  Gluc: 192 mg/dL / Ketone: x  / Bili: x / Urobili: x   Blood: x / Protein: x / Nitrite: x   Leuk Esterase: x / RBC: x / WBC x   Sq Epi: x / Non Sq Epi: x / Bacteria: x      COVID-19 PCR: NotDetec (21 Jul 2023 19:49)  COVID-19 PCR: NotDetec (13 Jul 2023 05:30)      Radiology: Reviewed Patient is a 73y old  Female who presents with a chief complaint of RA flare (10 Oct 2023 11:00)    OVERNIGHT EVENTS/INTERVAL HPI: No acute events overnight. Pt was seen and examined at bedside. Denies any pain.     REVIEW OF SYSTEMS:  All other review of systems is negative unless indicated above.    OBJECTIVE:  T(C): 36.8 (10-11-23 @ 05:12), Max: 36.8 (10-10-23 @ 15:19)  HR: 60 (10-11-23 @ 05:12) (60 - 78)  BP: 142/83 (10-11-23 @ 05:12) (118/71 - 152/88)  RR: 17 (10-11-23 @ 05:12) (17 - 18)  SpO2: 98% (10-11-23 @ 05:12) (94% - 98%)  Daily     Daily     Physical Exam:  General: in no acute distress  Lungs: CTA B/L. No wheezes, rales, or rhonchi  Cardiovascular: RRR. No murmurs, rubs, or gallops  Abdomen: Soft, non-tender non-distended; No rebound or guarding  Extremities: +b/l ulnar deviation  MSK: No midline bony tenderness. No CVA tenderness bilaterally  Neurological: Alert and oriented x3  Skin: +port on chest; c/d/i,  Warm and dry. No obvious rash     Medications:  MEDICATIONS  (STANDING):  amLODIPine   Tablet 10 milliGRAM(s) Oral every 24 hours  aspirin  chewable 81 milliGRAM(s) Oral daily  atorvastatin 40 milliGRAM(s) Oral at bedtime  cyanocobalamin 1000 MICROGram(s) Oral daily  dextrose 5%. 1000 milliLiter(s) (50 mL/Hr) IV Continuous <Continuous>  dextrose 5%. 1000 milliLiter(s) (100 mL/Hr) IV Continuous <Continuous>  dextrose 50% Injectable 25 Gram(s) IV Push once  dextrose 50% Injectable 25 Gram(s) IV Push once  dextrose 50% Injectable 12.5 Gram(s) IV Push once  dextrose 50% Injectable 25 Gram(s) IV Push once  enoxaparin Injectable 40 milliGRAM(s) SubCutaneous every 24 hours  folic acid 1 milliGRAM(s) Oral daily  glucagon  Injectable 1 milliGRAM(s) IntraMuscular once  hydroxychloroquine 200 milliGRAM(s) Oral two times a day  influenza  Vaccine (HIGH DOSE) 0.7 milliLiter(s) IntraMuscular once  insulin glargine Injectable (LANTUS) 12 Unit(s) SubCutaneous at bedtime  insulin lispro (ADMELOG) corrective regimen sliding scale   SubCutaneous Before meals and at bedtime  insulin lispro Injectable (ADMELOG) 7 Unit(s) SubCutaneous three times a day before meals  losartan 100 milliGRAM(s) Oral every 24 hours  metoprolol succinate  milliGRAM(s) Oral daily  polyethylene glycol 3350 17 Gram(s) Oral daily  predniSONE   Tablet 20 milliGRAM(s) Oral daily  senna 2 Tablet(s) Oral at bedtime  sulfaSALAzine Entabs 1000 milliGRAM(s) Oral every 12 hours    MEDICATIONS  (PRN):  acetaminophen     Tablet .. 650 milliGRAM(s) Oral every 6 hours PRN Temp greater or equal to 38C (100.4F), Mild Pain (1 - 3)  aluminum hydroxide/magnesium hydroxide/simethicone Suspension 30 milliLiter(s) Oral every 6 hours PRN Dyspepsia  dextrose Oral Gel 15 Gram(s) Oral once PRN Blood Glucose LESS THAN 70 milliGRAM(s)/deciliter  melatonin 3 milliGRAM(s) Oral at bedtime PRN Insomnia      Labs:                        10.9   14.32 )-----------( 285      ( 10 Oct 2023 05:30 )             33.9     10-10    133<L>  |  101  |  29<H>  ----------------------------<  192<H>  3.8   |  21<L>  |  1.01    Ca    8.8      10 Oct 2023 05:30  Phos  1.9     10-10  Mg     1.7     10-10    TPro  6.3  /  Alb  3.0<L>  /  TBili  0.2  /  DBili  x   /  AST  18  /  ALT  11  /  AlkPhos  87  10-10      Urinalysis Basic - ( 10 Oct 2023 05:30 )    Color: x / Appearance: x / SG: x / pH: x  Gluc: 192 mg/dL / Ketone: x  / Bili: x / Urobili: x   Blood: x / Protein: x / Nitrite: x   Leuk Esterase: x / RBC: x / WBC x   Sq Epi: x / Non Sq Epi: x / Bacteria: x      COVID-19 PCR: NotDetec (21 Jul 2023 19:49)  COVID-19 PCR: NotDetec (13 Jul 2023 05:30)      Radiology: Reviewed

## 2023-10-12 LAB
ALBUMIN SERPL ELPH-MCNC: 3 G/DL — LOW (ref 3.3–5)
ALP SERPL-CCNC: 77 U/L — SIGNIFICANT CHANGE UP (ref 40–120)
ALT FLD-CCNC: 15 U/L — SIGNIFICANT CHANGE UP (ref 10–45)
ANION GAP SERPL CALC-SCNC: 7 MMOL/L — SIGNIFICANT CHANGE UP (ref 5–17)
AST SERPL-CCNC: 19 U/L — SIGNIFICANT CHANGE UP (ref 10–40)
BASOPHILS # BLD AUTO: 0.15 K/UL — SIGNIFICANT CHANGE UP (ref 0–0.2)
BASOPHILS NFR BLD AUTO: 1.1 % — SIGNIFICANT CHANGE UP (ref 0–2)
BILIRUB SERPL-MCNC: 0.2 MG/DL — SIGNIFICANT CHANGE UP (ref 0.2–1.2)
BUN SERPL-MCNC: 25 MG/DL — HIGH (ref 7–23)
CALCIUM SERPL-MCNC: 8.8 MG/DL — SIGNIFICANT CHANGE UP (ref 8.4–10.5)
CHLORIDE SERPL-SCNC: 102 MMOL/L — SIGNIFICANT CHANGE UP (ref 96–108)
CO2 SERPL-SCNC: 23 MMOL/L — SIGNIFICANT CHANGE UP (ref 22–31)
CREAT SERPL-MCNC: 0.96 MG/DL — SIGNIFICANT CHANGE UP (ref 0.5–1.3)
EGFR: 62 ML/MIN/1.73M2 — SIGNIFICANT CHANGE UP
EOSINOPHIL # BLD AUTO: 0.2 K/UL — SIGNIFICANT CHANGE UP (ref 0–0.5)
EOSINOPHIL NFR BLD AUTO: 1.5 % — SIGNIFICANT CHANGE UP (ref 0–6)
GLUCOSE BLDC GLUCOMTR-MCNC: 102 MG/DL — HIGH (ref 70–99)
GLUCOSE BLDC GLUCOMTR-MCNC: 250 MG/DL — HIGH (ref 70–99)
GLUCOSE BLDC GLUCOMTR-MCNC: 250 MG/DL — HIGH (ref 70–99)
GLUCOSE BLDC GLUCOMTR-MCNC: 253 MG/DL — HIGH (ref 70–99)
GLUCOSE BLDC GLUCOMTR-MCNC: 306 MG/DL — HIGH (ref 70–99)
GLUCOSE SERPL-MCNC: 234 MG/DL — HIGH (ref 70–99)
HCT VFR BLD CALC: 34.5 % — SIGNIFICANT CHANGE UP (ref 34.5–45)
HGB BLD-MCNC: 11.2 G/DL — LOW (ref 11.5–15.5)
IMM GRANULOCYTES NFR BLD AUTO: 3.6 % — HIGH (ref 0–0.9)
LYMPHOCYTES # BLD AUTO: 1.57 K/UL — SIGNIFICANT CHANGE UP (ref 1–3.3)
LYMPHOCYTES # BLD AUTO: 11.5 % — LOW (ref 13–44)
MAGNESIUM SERPL-MCNC: 1.9 MG/DL — SIGNIFICANT CHANGE UP (ref 1.6–2.6)
MCHC RBC-ENTMCNC: 29.1 PG — SIGNIFICANT CHANGE UP (ref 27–34)
MCHC RBC-ENTMCNC: 32.5 GM/DL — SIGNIFICANT CHANGE UP (ref 32–36)
MCV RBC AUTO: 89.6 FL — SIGNIFICANT CHANGE UP (ref 80–100)
MONOCYTES # BLD AUTO: 0.68 K/UL — SIGNIFICANT CHANGE UP (ref 0–0.9)
MONOCYTES NFR BLD AUTO: 5 % — SIGNIFICANT CHANGE UP (ref 2–14)
NEUTROPHILS # BLD AUTO: 10.55 K/UL — HIGH (ref 1.8–7.4)
NEUTROPHILS NFR BLD AUTO: 77.3 % — HIGH (ref 43–77)
NRBC # BLD: 0 /100 WBCS — SIGNIFICANT CHANGE UP (ref 0–0)
PHOSPHATE SERPL-MCNC: 2.6 MG/DL — SIGNIFICANT CHANGE UP (ref 2.5–4.5)
PLATELET # BLD AUTO: 279 K/UL — SIGNIFICANT CHANGE UP (ref 150–400)
POTASSIUM SERPL-MCNC: 4.4 MMOL/L — SIGNIFICANT CHANGE UP (ref 3.5–5.3)
POTASSIUM SERPL-SCNC: 4.4 MMOL/L — SIGNIFICANT CHANGE UP (ref 3.5–5.3)
PROT SERPL-MCNC: 6.5 G/DL — SIGNIFICANT CHANGE UP (ref 6–8.3)
RBC # BLD: 3.85 M/UL — SIGNIFICANT CHANGE UP (ref 3.8–5.2)
RBC # FLD: 14.5 % — SIGNIFICANT CHANGE UP (ref 10.3–14.5)
SODIUM SERPL-SCNC: 132 MMOL/L — LOW (ref 135–145)
WBC # BLD: 13.64 K/UL — HIGH (ref 3.8–10.5)
WBC # FLD AUTO: 13.64 K/UL — HIGH (ref 3.8–10.5)

## 2023-10-12 PROCEDURE — 99232 SBSQ HOSP IP/OBS MODERATE 35: CPT | Mod: GC

## 2023-10-12 RX ORDER — INSULIN GLARGINE 100 [IU]/ML
17 INJECTION, SOLUTION SUBCUTANEOUS AT BEDTIME
Refills: 0 | Status: DISCONTINUED | OUTPATIENT
Start: 2023-10-12 | End: 2023-10-15

## 2023-10-12 RX ORDER — INSULIN LISPRO 100/ML
9 VIAL (ML) SUBCUTANEOUS
Refills: 0 | Status: DISCONTINUED | OUTPATIENT
Start: 2023-10-12 | End: 2023-10-15

## 2023-10-12 RX ORDER — LIDOCAINE 4 G/100G
1 CREAM TOPICAL EVERY 24 HOURS
Refills: 0 | Status: DISCONTINUED | OUTPATIENT
Start: 2023-10-12 | End: 2023-10-18

## 2023-10-12 RX ADMIN — ENOXAPARIN SODIUM 40 MILLIGRAM(S): 100 INJECTION SUBCUTANEOUS at 22:46

## 2023-10-12 RX ADMIN — LIDOCAINE 1 PATCH: 4 CREAM TOPICAL at 19:04

## 2023-10-12 RX ADMIN — INSULIN GLARGINE 17 UNIT(S): 100 INJECTION, SOLUTION SUBCUTANEOUS at 22:50

## 2023-10-12 RX ADMIN — Medication 20 MILLIGRAM(S): at 06:19

## 2023-10-12 RX ADMIN — ATORVASTATIN CALCIUM 40 MILLIGRAM(S): 80 TABLET, FILM COATED ORAL at 22:47

## 2023-10-12 RX ADMIN — LIDOCAINE 1 PATCH: 4 CREAM TOPICAL at 12:33

## 2023-10-12 RX ADMIN — Medication 200 MILLIGRAM(S): at 06:19

## 2023-10-12 RX ADMIN — Medication 4: at 18:20

## 2023-10-12 RX ADMIN — Medication 8 UNIT(S): at 13:20

## 2023-10-12 RX ADMIN — Medication 100 MILLIGRAM(S): at 06:18

## 2023-10-12 RX ADMIN — LOSARTAN POTASSIUM 100 MILLIGRAM(S): 100 TABLET, FILM COATED ORAL at 22:47

## 2023-10-12 RX ADMIN — Medication 650 MILLIGRAM(S): at 11:17

## 2023-10-12 RX ADMIN — Medication 3: at 09:33

## 2023-10-12 RX ADMIN — Medication 8 UNIT(S): at 09:34

## 2023-10-12 RX ADMIN — Medication 8 UNIT(S): at 18:20

## 2023-10-12 RX ADMIN — Medication 200 MILLIGRAM(S): at 18:20

## 2023-10-12 RX ADMIN — AMLODIPINE BESYLATE 10 MILLIGRAM(S): 2.5 TABLET ORAL at 22:47

## 2023-10-12 RX ADMIN — Medication 650 MILLIGRAM(S): at 09:06

## 2023-10-12 NOTE — PROGRESS NOTE ADULT - ASSESSMENT
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 (s/p RT x1 07/2023, s/p chemo x1 09/07/23 at [facility] [place] presents with diffuse joint pain, admitted with RA flare, admitted for pain control and PT/OT and consideration of continued inpatient management of NSCLC

## 2023-10-12 NOTE — PROGRESS NOTE ADULT - PROBLEM SELECTOR PLAN 1
Intermittent flares, now with b/l joint, arm, and shoulder pain c/b inability to get out of bed today 2/2 pain. Currently well controlled after steroids/tylenol. Home meds: sulfasalazine and plaquenil; currently on sulfasalzine 1000 mg BID (recently increased from 500 mg BID) and plaquenil 200 mg BID   ESR 55, CRP 13.6  - C/w home Sulfasalazine and Plaquenil  - Completed 5 days 20 mg prednisone (last day 10/12)  - Steroid taper of 10 mg   - Pain regimen: Tylenol   - PT/OT

## 2023-10-12 NOTE — PROGRESS NOTE ADULT - PROBLEM SELECTOR PLAN 2
Dx with NSCLC with mets to brain on last admission, started CRT, [place] [facility] [place], last RT completed on previous admission 07/2023    - Continue B12/folate supplementation  - Pt s/p 3 radiation treatments  - Spoke w/ outpatient physician Dr. Cathie Jeffrey ([phone]) stated pt is s/p one cycle of pembrolizumab in mid September and has been in and out of the hospital since. Believes the flares may be due to the chemo and will reassess the plan at her next appt.

## 2023-10-12 NOTE — PROGRESS NOTE ADULT - SUBJECTIVE AND OBJECTIVE BOX
INTERVAL HPI/OVERNIGHT EVENTS:  Patient seen and examined;  Complaining of shoulder pain today   Discussed with oncology ; Will taper steroids to 10mg a day for a few dyas the discontinue;  Glucose control   Needs to stay in hospital with tapering  of steroids   Shoulder pain wants lidocaine patch         MEDICATIONS  (STANDING):  amLODIPine   Tablet 10 milliGRAM(s) Oral every 24 hours  aspirin  chewable 81 milliGRAM(s) Oral daily  atorvastatin 40 milliGRAM(s) Oral at bedtime  cyanocobalamin 1000 MICROGram(s) Oral daily  dextrose 5%. 1000 milliLiter(s) (100 mL/Hr) IV Continuous <Continuous>  dextrose 5%. 1000 milliLiter(s) (50 mL/Hr) IV Continuous <Continuous>  dextrose 50% Injectable 25 Gram(s) IV Push once  dextrose 50% Injectable 25 Gram(s) IV Push once  dextrose 50% Injectable 12.5 Gram(s) IV Push once  dextrose 50% Injectable 25 Gram(s) IV Push once  enoxaparin Injectable 40 milliGRAM(s) SubCutaneous every 24 hours  folic acid 1 milliGRAM(s) Oral daily  glucagon  Injectable 1 milliGRAM(s) IntraMuscular once  hydroxychloroquine 200 milliGRAM(s) Oral two times a day  influenza  Vaccine (HIGH DOSE) 0.7 milliLiter(s) IntraMuscular once  insulin glargine Injectable (LANTUS) 14 Unit(s) SubCutaneous at bedtime  insulin lispro (ADMELOG) corrective regimen sliding scale   SubCutaneous Before meals and at bedtime  insulin lispro Injectable (ADMELOG) 8 Unit(s) SubCutaneous three times a day before meals  losartan 100 milliGRAM(s) Oral every 24 hours  metoprolol succinate  milliGRAM(s) Oral daily  polyethylene glycol 3350 17 Gram(s) Oral daily  predniSONE   Tablet 10 milliGRAM(s) Oral daily  senna 2 Tablet(s) Oral at bedtime  SULFASALAZINE 50 DELAYED RELEASE TABLETS 2 Tablet(s) 2 Tablet(s) Oral every 12 hours    MEDICATIONS  (PRN):  acetaminophen     Tablet .. 650 milliGRAM(s) Oral every 6 hours PRN Temp greater or equal to 38C (100.4F), Mild Pain (1 - 3)  aluminum hydroxide/magnesium hydroxide/simethicone Suspension 30 milliLiter(s) Oral every 6 hours PRN Dyspepsia  dextrose Oral Gel 15 Gram(s) Oral once PRN Blood Glucose LESS THAN 70 milliGRAM(s)/deciliter  melatonin 3 milliGRAM(s) Oral at bedtime PRN Insomnia      Allergies    Bactrim (Other)    Intolerances        Vital Signs Last 24 Hrs  T(C): 36.7 (12 Oct 2023 06:03), Max: 37.1 (11 Oct 2023 21:05)  T(F): 98.1 (12 Oct 2023 06:03), Max: 98.8 (11 Oct 2023 21:05)  HR: 58 (12 Oct 2023 06:03) (56 - 66)  BP: 144/84 (12 Oct 2023 06:03) (121/68 - 144/84)  BP(mean): --  RR: 18 (12 Oct 2023 06:03) (18 - 18)  SpO2: 99% (12 Oct 2023 06:03) (96% - 99%)    Parameters below as of 11 Oct 2023 21:05  Patient On (Oxygen Delivery Method): room air              Constitutional: Awake     Eyes: NEDRA    ENMT: Negative    Neck: Supple    Back:  no tenderness     Respiratory:  clear     Cardiovascular: S1 S2    Gastrointestinal: soft     Genitourinary:    Extremities:  no edema     Vascular:    Neurological:    Skin:    Lymph Nodes:            10-11 @ 07:01  -  10-12 @ 07:00  --------------------------------------------------------  IN: 0 mL / OUT: 1300 mL / NET: -1300 mL      LABS:                        11.2   13.64 )-----------( 279      ( 12 Oct 2023 09:24 )             34.5     10-12    132<L>  |  102  |  25<H>  ----------------------------<  234<H>  4.4   |  23  |  0.96    Ca    8.8      12 Oct 2023 09:24  Phos  2.6     10-12  Mg     1.9     10-12    TPro  6.5  /  Alb  3.0<L>  /  TBili  0.2  /  DBili  x   /  AST  19  /  ALT  15  /  AlkPhos  77  10-12      Urinalysis Basic - ( 12 Oct 2023 09:24 )    Color: x / Appearance: x / SG: x / pH: x  Gluc: 234 mg/dL / Ketone: x  / Bili: x / Urobili: x   Blood: x / Protein: x / Nitrite: x   Leuk Esterase: x / RBC: x / WBC x   Sq Epi: x / Non Sq Epi: x / Bacteria: x        RADIOLOGY & ADDITIONAL TESTS:

## 2023-10-12 NOTE — PROGRESS NOTE ADULT - SUBJECTIVE AND OBJECTIVE BOX
Patient is a 73y old  Female who presents with a chief complaint of RA flare (12 Oct 2023 11:23)    OVERNIGHT EVENTS/INTERVAL HPI: No acute events overnight. Pt seen and examined at bedside this AM. Denies any pain. ROS negative.     REVIEW OF SYSTEMS:  All other review of systems is negative unless indicated above.    OBJECTIVE:  T(C): 36.7 (10-12-23 @ 06:03), Max: 37.1 (10-11-23 @ 21:05)  HR: 58 (10-12-23 @ 06:03) (56 - 66)  BP: 144/84 (10-12-23 @ 06:03) (121/68 - 144/84)  RR: 18 (10-12-23 @ 06:03) (18 - 18)  SpO2: 99% (10-12-23 @ 06:03) (96% - 99%)  Daily     Daily     Physical Exam:  General: in no acute distress  Lungs: CTA B/L. No wheezes, rales, or rhonchi  Cardiovascular: RRR. No murmurs, rubs, or gallops  Abdomen: Soft, non-tender non-distended; No rebound or guarding  Extremities: Presence of ulnar deviation   MSK: No midline bony tenderness. No CVA tenderness bilaterally  Neurological: Alert and oriented x3  Skin: Port in place; c/d/i; Warm and dry. No obvious rash     Medications:  MEDICATIONS  (STANDING):  amLODIPine   Tablet 10 milliGRAM(s) Oral every 24 hours  aspirin  chewable 81 milliGRAM(s) Oral daily  atorvastatin 40 milliGRAM(s) Oral at bedtime  cyanocobalamin 1000 MICROGram(s) Oral daily  dextrose 5%. 1000 milliLiter(s) (50 mL/Hr) IV Continuous <Continuous>  dextrose 5%. 1000 milliLiter(s) (100 mL/Hr) IV Continuous <Continuous>  dextrose 50% Injectable 25 Gram(s) IV Push once  dextrose 50% Injectable 25 Gram(s) IV Push once  dextrose 50% Injectable 12.5 Gram(s) IV Push once  dextrose 50% Injectable 25 Gram(s) IV Push once  enoxaparin Injectable 40 milliGRAM(s) SubCutaneous every 24 hours  folic acid 1 milliGRAM(s) Oral daily  glucagon  Injectable 1 milliGRAM(s) IntraMuscular once  hydroxychloroquine 200 milliGRAM(s) Oral two times a day  influenza  Vaccine (HIGH DOSE) 0.7 milliLiter(s) IntraMuscular once  insulin glargine Injectable (LANTUS) 14 Unit(s) SubCutaneous at bedtime  insulin lispro (ADMELOG) corrective regimen sliding scale   SubCutaneous Before meals and at bedtime  insulin lispro Injectable (ADMELOG) 8 Unit(s) SubCutaneous three times a day before meals  lidocaine   4% Patch 1 Patch Transdermal every 24 hours  losartan 100 milliGRAM(s) Oral every 24 hours  metoprolol succinate  milliGRAM(s) Oral daily  polyethylene glycol 3350 17 Gram(s) Oral daily  predniSONE   Tablet 10 milliGRAM(s) Oral daily  senna 2 Tablet(s) Oral at bedtime  SULFASALAZINE 50 DELAYED RELEASE TABLETS 2 Tablet(s) 2 Tablet(s) Oral every 12 hours    MEDICATIONS  (PRN):  acetaminophen     Tablet .. 650 milliGRAM(s) Oral every 6 hours PRN Temp greater or equal to 38C (100.4F), Mild Pain (1 - 3)  aluminum hydroxide/magnesium hydroxide/simethicone Suspension 30 milliLiter(s) Oral every 6 hours PRN Dyspepsia  dextrose Oral Gel 15 Gram(s) Oral once PRN Blood Glucose LESS THAN 70 milliGRAM(s)/deciliter  melatonin 3 milliGRAM(s) Oral at bedtime PRN Insomnia      Labs:                        11.2   13.64 )-----------( 279      ( 12 Oct 2023 09:24 )             34.5     10-12    132<L>  |  102  |  25<H>  ----------------------------<  234<H>  4.4   |  23  |  0.96    Ca    8.8      12 Oct 2023 09:24  Phos  2.6     10-12  Mg     1.9     10-12    TPro  6.5  /  Alb  3.0<L>  /  TBili  0.2  /  DBili  x   /  AST  19  /  ALT  15  /  AlkPhos  77  10-12      Urinalysis Basic - ( 12 Oct 2023 09:24 )    Color: x / Appearance: x / SG: x / pH: x  Gluc: 234 mg/dL / Ketone: x  / Bili: x / Urobili: x   Blood: x / Protein: x / Nitrite: x   Leuk Esterase: x / RBC: x / WBC x   Sq Epi: x / Non Sq Epi: x / Bacteria: x      COVID-19 PCR: NotDetec (21 Jul 2023 19:49)  COVID-19 PCR: NotDetec (13 Jul 2023 05:30)      Radiology: Reviewed

## 2023-10-12 NOTE — PROGRESS NOTE ADULT - PROBLEM SELECTOR PLAN 3
A1C results: 7.4 last admission 07/2023; endocrine consulted at that time deeming a finalized regimen of lispro 10 u before each meal; experienced nocturnal hypoglycemia and insulin regimen recently changed at [facility] given repeated hypoglycemia   BS >300  - Increase lispro to 8 TID and increased Lantus to 14  - iSS

## 2023-10-13 LAB
GLUCOSE BLDC GLUCOMTR-MCNC: 203 MG/DL — HIGH (ref 70–99)
GLUCOSE BLDC GLUCOMTR-MCNC: 218 MG/DL — HIGH (ref 70–99)
GLUCOSE BLDC GLUCOMTR-MCNC: 243 MG/DL — HIGH (ref 70–99)
GLUCOSE BLDC GLUCOMTR-MCNC: 333 MG/DL — HIGH (ref 70–99)

## 2023-10-13 PROCEDURE — 99232 SBSQ HOSP IP/OBS MODERATE 35: CPT | Mod: GC

## 2023-10-13 RX ADMIN — Medication 650 MILLIGRAM(S): at 10:53

## 2023-10-13 RX ADMIN — AMLODIPINE BESYLATE 10 MILLIGRAM(S): 2.5 TABLET ORAL at 21:24

## 2023-10-13 RX ADMIN — Medication 9 UNIT(S): at 09:50

## 2023-10-13 RX ADMIN — Medication 200 MILLIGRAM(S): at 18:40

## 2023-10-13 RX ADMIN — LIDOCAINE 1 PATCH: 4 CREAM TOPICAL at 12:36

## 2023-10-13 RX ADMIN — Medication 9 UNIT(S): at 18:39

## 2023-10-13 RX ADMIN — ENOXAPARIN SODIUM 40 MILLIGRAM(S): 100 INJECTION SUBCUTANEOUS at 21:21

## 2023-10-13 RX ADMIN — Medication 1 MILLIGRAM(S): at 12:37

## 2023-10-13 RX ADMIN — Medication 10 MILLIGRAM(S): at 06:15

## 2023-10-13 RX ADMIN — INSULIN GLARGINE 17 UNIT(S): 100 INJECTION, SOLUTION SUBCUTANEOUS at 22:35

## 2023-10-13 RX ADMIN — PREGABALIN 1000 MICROGRAM(S): 225 CAPSULE ORAL at 12:38

## 2023-10-13 RX ADMIN — LOSARTAN POTASSIUM 100 MILLIGRAM(S): 100 TABLET, FILM COATED ORAL at 21:24

## 2023-10-13 RX ADMIN — SENNA PLUS 2 TABLET(S): 8.6 TABLET ORAL at 21:21

## 2023-10-13 RX ADMIN — LIDOCAINE 1 PATCH: 4 CREAM TOPICAL at 19:40

## 2023-10-13 RX ADMIN — Medication 650 MILLIGRAM(S): at 09:53

## 2023-10-13 RX ADMIN — Medication 200 MILLIGRAM(S): at 06:15

## 2023-10-13 RX ADMIN — Medication 2: at 22:34

## 2023-10-13 RX ADMIN — Medication 81 MILLIGRAM(S): at 12:38

## 2023-10-13 RX ADMIN — Medication 9 UNIT(S): at 13:43

## 2023-10-13 RX ADMIN — Medication 100 MILLIGRAM(S): at 06:16

## 2023-10-13 RX ADMIN — ATORVASTATIN CALCIUM 40 MILLIGRAM(S): 80 TABLET, FILM COATED ORAL at 21:22

## 2023-10-13 RX ADMIN — Medication 4: at 13:42

## 2023-10-13 RX ADMIN — Medication 2: at 18:38

## 2023-10-13 RX ADMIN — Medication 2: at 09:50

## 2023-10-13 NOTE — PROGRESS NOTE ADULT - SUBJECTIVE AND OBJECTIVE BOX
Internal Medicine Progress Note  Marylin Charlton, PGY-1  Pager: [phone]    ******INCOMPLETE******    Patient is a 73y old  Female who presents with a chief complaint of RA flare (12 Oct 2023 13:39)    OVERNIGHT EVENTS/INTERVAL HPI:    REVIEW OF SYSTEMS:  All other review of systems is negative unless indicated above.    OBJECTIVE:  T(C): 36.8 (10-13-23 @ 05:20), Max: 36.8 (10-13-23 @ 05:20)  HR: 63 (10-13-23 @ 05:20) (63 - 78)  BP: 163/80 (10-13-23 @ 05:20) (104/68 - 163/80)  RR: 18 (10-13-23 @ 05:20) (18 - 18)  SpO2: 96% (10-13-23 @ 05:20) (96% - 96%)  Daily     Daily     Physical Exam:  General: in no acute distress  Eyes: EOMI intact bilaterally. Anicteric sclerae, moist conjunctivae  HENT: Moist mucous membranes  Neck: Trachea midline, supple  Lungs: CTA B/L. No wheezes, rales, or rhonchi  Cardiovascular: RRR. No murmurs, rubs, or gallops  Abdomen: Soft, non-tender non-distended; No rebound or guarding  Extremities: WWP, No clubbing, cyanosis or edema  MSK: No midline bony tenderness. No CVA tenderness bilaterally  Neurological: Alert and oriented x3  Skin: Warm and dry. No obvious rash     Medications:  MEDICATIONS  (STANDING):  amLODIPine   Tablet 10 milliGRAM(s) Oral every 24 hours  aspirin  chewable 81 milliGRAM(s) Oral daily  atorvastatin 40 milliGRAM(s) Oral at bedtime  cyanocobalamin 1000 MICROGram(s) Oral daily  dextrose 5%. 1000 milliLiter(s) (100 mL/Hr) IV Continuous <Continuous>  dextrose 5%. 1000 milliLiter(s) (50 mL/Hr) IV Continuous <Continuous>  dextrose 50% Injectable 25 Gram(s) IV Push once  dextrose 50% Injectable 12.5 Gram(s) IV Push once  dextrose 50% Injectable 25 Gram(s) IV Push once  dextrose 50% Injectable 25 Gram(s) IV Push once  enoxaparin Injectable 40 milliGRAM(s) SubCutaneous every 24 hours  folic acid 1 milliGRAM(s) Oral daily  glucagon  Injectable 1 milliGRAM(s) IntraMuscular once  hydroxychloroquine 200 milliGRAM(s) Oral two times a day  influenza  Vaccine (HIGH DOSE) 0.7 milliLiter(s) IntraMuscular once  insulin glargine Injectable (LANTUS) 17 Unit(s) SubCutaneous at bedtime  insulin lispro (ADMELOG) corrective regimen sliding scale   SubCutaneous Before meals and at bedtime  insulin lispro Injectable (ADMELOG) 9 Unit(s) SubCutaneous three times a day before meals  lidocaine   4% Patch 1 Patch Transdermal every 24 hours  losartan 100 milliGRAM(s) Oral every 24 hours  metoprolol succinate  milliGRAM(s) Oral daily  polyethylene glycol 3350 17 Gram(s) Oral daily  predniSONE   Tablet 10 milliGRAM(s) Oral daily  senna 2 Tablet(s) Oral at bedtime  SULFASALAZINE 50 DELAYED RELEASE TABLETS 2 Tablet(s) 2 Tablet(s) Oral every 12 hours    MEDICATIONS  (PRN):  acetaminophen     Tablet .. 650 milliGRAM(s) Oral every 6 hours PRN Temp greater or equal to 38C (100.4F), Mild Pain (1 - 3)  aluminum hydroxide/magnesium hydroxide/simethicone Suspension 30 milliLiter(s) Oral every 6 hours PRN Dyspepsia  dextrose Oral Gel 15 Gram(s) Oral once PRN Blood Glucose LESS THAN 70 milliGRAM(s)/deciliter  melatonin 3 milliGRAM(s) Oral at bedtime PRN Insomnia      Labs:                        11.2   13.64 )-----------( 279      ( 12 Oct 2023 09:24 )             34.5     10-12    132<L>  |  102  |  25<H>  ----------------------------<  234<H>  4.4   |  23  |  0.96    Ca    8.8      12 Oct 2023 09:24  Phos  2.6     10-12  Mg     1.9     10-12    TPro  6.5  /  Alb  3.0<L>  /  TBili  0.2  /  DBili  x   /  AST  19  /  ALT  15  /  AlkPhos  77  10-12      Urinalysis Basic - ( 12 Oct 2023 09:24 )    Color: x / Appearance: x / SG: x / pH: x  Gluc: 234 mg/dL / Ketone: x  / Bili: x / Urobili: x   Blood: x / Protein: x / Nitrite: x   Leuk Esterase: x / RBC: x / WBC x   Sq Epi: x / Non Sq Epi: x / Bacteria: x      COVID-19 PCR: NotDetec (21 Jul 2023 19:49)  COVID-19 PCR: NotDetec (13 Jul 2023 05:30)      Radiology: Reviewed Patient is a 73y old  Female who presents with a chief complaint of RA flare (12 Oct 2023 13:39)    OVERNIGHT EVENTS/INTERVAL HPI: No acute events overnight. Repeat POC this AM 200s. Pt seen and examined at bedside. Denies any complaints.     REVIEW OF SYSTEMS:  All other review of systems is negative unless indicated above.    OBJECTIVE:  T(C): 36.8 (10-13-23 @ 05:20), Max: 36.8 (10-13-23 @ 05:20)  HR: 63 (10-13-23 @ 05:20) (63 - 78)  BP: 163/80 (10-13-23 @ 05:20) (104/68 - 163/80)  RR: 18 (10-13-23 @ 05:20) (18 - 18)  SpO2: 96% (10-13-23 @ 05:20) (96% - 96%)  Daily     Daily     Physical Exam:  General: in no acute distress  Lungs: CTA B/L. No wheezes, rales, or rhonchi  Cardiovascular: RRR. No murmurs, rubs, or gallops  Abdomen: Soft, non-tender non-distended; No rebound or guarding  Extremities: WWP, No clubbing, cyanosis or edema  MSK: ulnar deviation b/l   Neurological: Alert and oriented x3  Skin: port in place; c/d/i    Medications:  MEDICATIONS  (STANDING):  amLODIPine   Tablet 10 milliGRAM(s) Oral every 24 hours  aspirin  chewable 81 milliGRAM(s) Oral daily  atorvastatin 40 milliGRAM(s) Oral at bedtime  cyanocobalamin 1000 MICROGram(s) Oral daily  dextrose 5%. 1000 milliLiter(s) (100 mL/Hr) IV Continuous <Continuous>  dextrose 5%. 1000 milliLiter(s) (50 mL/Hr) IV Continuous <Continuous>  dextrose 50% Injectable 25 Gram(s) IV Push once  dextrose 50% Injectable 12.5 Gram(s) IV Push once  dextrose 50% Injectable 25 Gram(s) IV Push once  dextrose 50% Injectable 25 Gram(s) IV Push once  enoxaparin Injectable 40 milliGRAM(s) SubCutaneous every 24 hours  folic acid 1 milliGRAM(s) Oral daily  glucagon  Injectable 1 milliGRAM(s) IntraMuscular once  hydroxychloroquine 200 milliGRAM(s) Oral two times a day  influenza  Vaccine (HIGH DOSE) 0.7 milliLiter(s) IntraMuscular once  insulin glargine Injectable (LANTUS) 17 Unit(s) SubCutaneous at bedtime  insulin lispro (ADMELOG) corrective regimen sliding scale   SubCutaneous Before meals and at bedtime  insulin lispro Injectable (ADMELOG) 9 Unit(s) SubCutaneous three times a day before meals  lidocaine   4% Patch 1 Patch Transdermal every 24 hours  losartan 100 milliGRAM(s) Oral every 24 hours  metoprolol succinate  milliGRAM(s) Oral daily  polyethylene glycol 3350 17 Gram(s) Oral daily  predniSONE   Tablet 10 milliGRAM(s) Oral daily  senna 2 Tablet(s) Oral at bedtime  SULFASALAZINE 50 DELAYED RELEASE TABLETS 2 Tablet(s) 2 Tablet(s) Oral every 12 hours    MEDICATIONS  (PRN):  acetaminophen     Tablet .. 650 milliGRAM(s) Oral every 6 hours PRN Temp greater or equal to 38C (100.4F), Mild Pain (1 - 3)  aluminum hydroxide/magnesium hydroxide/simethicone Suspension 30 milliLiter(s) Oral every 6 hours PRN Dyspepsia  dextrose Oral Gel 15 Gram(s) Oral once PRN Blood Glucose LESS THAN 70 milliGRAM(s)/deciliter  melatonin 3 milliGRAM(s) Oral at bedtime PRN Insomnia      Labs:                        11.2   13.64 )-----------( 279      ( 12 Oct 2023 09:24 )             34.5     10-12    132<L>  |  102  |  25<H>  ----------------------------<  234<H>  4.4   |  23  |  0.96    Ca    8.8      12 Oct 2023 09:24  Phos  2.6     10-12  Mg     1.9     10-12    TPro  6.5  /  Alb  3.0<L>  /  TBili  0.2  /  DBili  x   /  AST  19  /  ALT  15  /  AlkPhos  77  10-12      Urinalysis Basic - ( 12 Oct 2023 09:24 )    Color: x / Appearance: x / SG: x / pH: x  Gluc: 234 mg/dL / Ketone: x  / Bili: x / Urobili: x   Blood: x / Protein: x / Nitrite: x   Leuk Esterase: x / RBC: x / WBC x   Sq Epi: x / Non Sq Epi: x / Bacteria: x      COVID-19 PCR: NotDetec (21 Jul 2023 19:49)  COVID-19 PCR: NotDetec (13 Jul 2023 05:30)      Radiology: Reviewed

## 2023-10-13 NOTE — PROGRESS NOTE ADULT - PROBLEM SELECTOR PLAN 3
A1C results: 7.4 last admission 07/2023; endocrine consulted at that time deeming a finalized regimen of lispro 10 u before each meal; experienced nocturnal hypoglycemia and insulin regimen recently changed at [facility] given repeated hypoglycemia   BS >300  - Increase lispro to 8 TID and increased Lantus to 14  - iSS

## 2023-10-13 NOTE — PROGRESS NOTE ADULT - SUBJECTIVE AND OBJECTIVE BOX
INTERVAL HPI/OVERNIGHT EVENTS:  Continues to improve  Muscle strength is good;  Getting physical therapy       MEDICATIONS  (STANDING):  amLODIPine   Tablet 10 milliGRAM(s) Oral every 24 hours  aspirin  chewable 81 milliGRAM(s) Oral daily  atorvastatin 40 milliGRAM(s) Oral at bedtime  cyanocobalamin 1000 MICROGram(s) Oral daily  dextrose 5%. 1000 milliLiter(s) (50 mL/Hr) IV Continuous <Continuous>  dextrose 5%. 1000 milliLiter(s) (100 mL/Hr) IV Continuous <Continuous>  dextrose 50% Injectable 25 Gram(s) IV Push once  dextrose 50% Injectable 12.5 Gram(s) IV Push once  dextrose 50% Injectable 25 Gram(s) IV Push once  dextrose 50% Injectable 25 Gram(s) IV Push once  enoxaparin Injectable 40 milliGRAM(s) SubCutaneous every 24 hours  folic acid 1 milliGRAM(s) Oral daily  glucagon  Injectable 1 milliGRAM(s) IntraMuscular once  hydroxychloroquine 200 milliGRAM(s) Oral two times a day  influenza  Vaccine (HIGH DOSE) 0.7 milliLiter(s) IntraMuscular once  insulin glargine Injectable (LANTUS) 17 Unit(s) SubCutaneous at bedtime  insulin lispro (ADMELOG) corrective regimen sliding scale   SubCutaneous Before meals and at bedtime  insulin lispro Injectable (ADMELOG) 9 Unit(s) SubCutaneous three times a day before meals  lidocaine   4% Patch 1 Patch Transdermal every 24 hours  losartan 100 milliGRAM(s) Oral every 24 hours  metoprolol succinate  milliGRAM(s) Oral daily  polyethylene glycol 3350 17 Gram(s) Oral daily  predniSONE   Tablet 10 milliGRAM(s) Oral daily  senna 2 Tablet(s) Oral at bedtime  SULFASALAZINE 50 DELAYED RELEASE TABLETS 2 Tablet(s) 2 Tablet(s) Oral every 12 hours    MEDICATIONS  (PRN):  acetaminophen     Tablet .. 650 milliGRAM(s) Oral every 6 hours PRN Temp greater or equal to 38C (100.4F), Mild Pain (1 - 3)  aluminum hydroxide/magnesium hydroxide/simethicone Suspension 30 milliLiter(s) Oral every 6 hours PRN Dyspepsia  dextrose Oral Gel 15 Gram(s) Oral once PRN Blood Glucose LESS THAN 70 milliGRAM(s)/deciliter  melatonin 3 milliGRAM(s) Oral at bedtime PRN Insomnia      Allergies    Bactrim (Other)    Intolerances        Vital Signs Last 24 Hrs  T(C): 36.9 (13 Oct 2023 09:57), Max: 36.9 (13 Oct 2023 09:57)  T(F): 98.5 (13 Oct 2023 09:57), Max: 98.5 (13 Oct 2023 09:57)  HR: 55 (13 Oct 2023 09:57) (55 - 78)  BP: 143/94 (13 Oct 2023 09:57) (104/68 - 163/80)  BP(mean): --  RR: 18 (13 Oct 2023 09:57) (18 - 18)  SpO2: 98% (13 Oct 2023 09:57) (96% - 98%)    Parameters below as of 13 Oct 2023 09:57  Patient On (Oxygen Delivery Method): room air              Constitutional: Awake     Eyes: NEDRA    ENMT: Negative    Neck: Supple    Back:  no tenderness     Respiratory:  clear     Cardiovascular: S1 S2    Gastrointestinal:  soft     Genitourinary:    Extremities:  no edema     Vascular:    Neurological:    Skin:    Lymph Nodes:            LABS:                        11.2   13.64 )-----------( 279      ( 12 Oct 2023 09:24 )             34.5     10-12    132<L>  |  102  |  25<H>  ----------------------------<  234<H>  4.4   |  23  |  0.96    Ca    8.8      12 Oct 2023 09:24  Phos  2.6     10-12  Mg     1.9     10-12    TPro  6.5  /  Alb  3.0<L>  /  TBili  0.2  /  DBili  x   /  AST  19  /  ALT  15  /  AlkPhos  77  10-12      Urinalysis Basic - ( 12 Oct 2023 09:24 )    Color: x / Appearance: x / SG: x / pH: x  Gluc: 234 mg/dL / Ketone: x  / Bili: x / Urobili: x   Blood: x / Protein: x / Nitrite: x   Leuk Esterase: x / RBC: x / WBC x   Sq Epi: x / Non Sq Epi: x / Bacteria: x        RADIOLOGY & ADDITIONAL TESTS:

## 2023-10-13 NOTE — PROGRESS NOTE ADULT - ASSESSMENT
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

## 2023-10-14 LAB
ALBUMIN SERPL ELPH-MCNC: 2.8 G/DL — LOW (ref 3.3–5)
ALP SERPL-CCNC: 96 U/L — SIGNIFICANT CHANGE UP (ref 40–120)
ALT FLD-CCNC: 19 U/L — SIGNIFICANT CHANGE UP (ref 10–45)
ANION GAP SERPL CALC-SCNC: 12 MMOL/L — SIGNIFICANT CHANGE UP (ref 5–17)
ANISOCYTOSIS BLD QL: SIGNIFICANT CHANGE UP
AST SERPL-CCNC: 27 U/L — SIGNIFICANT CHANGE UP (ref 10–40)
BASOPHILS # BLD AUTO: 0 K/UL — SIGNIFICANT CHANGE UP (ref 0–0.2)
BASOPHILS NFR BLD AUTO: 0 % — SIGNIFICANT CHANGE UP (ref 0–2)
BILIRUB SERPL-MCNC: 0.3 MG/DL — SIGNIFICANT CHANGE UP (ref 0.2–1.2)
BUN SERPL-MCNC: 22 MG/DL — SIGNIFICANT CHANGE UP (ref 7–23)
CALCIUM SERPL-MCNC: 9 MG/DL — SIGNIFICANT CHANGE UP (ref 8.4–10.5)
CHLORIDE SERPL-SCNC: 102 MMOL/L — SIGNIFICANT CHANGE UP (ref 96–108)
CO2 SERPL-SCNC: 17 MMOL/L — LOW (ref 22–31)
CREAT SERPL-MCNC: 0.89 MG/DL — SIGNIFICANT CHANGE UP (ref 0.5–1.3)
EGFR: 68 ML/MIN/1.73M2 — SIGNIFICANT CHANGE UP
EOSINOPHIL # BLD AUTO: 0.16 K/UL — SIGNIFICANT CHANGE UP (ref 0–0.5)
EOSINOPHIL NFR BLD AUTO: 1.7 % — SIGNIFICANT CHANGE UP (ref 0–6)
GIANT PLATELETS BLD QL SMEAR: PRESENT — SIGNIFICANT CHANGE UP
GLUCOSE BLDC GLUCOMTR-MCNC: 167 MG/DL — HIGH (ref 70–99)
GLUCOSE BLDC GLUCOMTR-MCNC: 182 MG/DL — HIGH (ref 70–99)
GLUCOSE BLDC GLUCOMTR-MCNC: 187 MG/DL — HIGH (ref 70–99)
GLUCOSE BLDC GLUCOMTR-MCNC: 206 MG/DL — HIGH (ref 70–99)
GLUCOSE BLDC GLUCOMTR-MCNC: 245 MG/DL — HIGH (ref 70–99)
GLUCOSE BLDC GLUCOMTR-MCNC: 396 MG/DL — HIGH (ref 70–99)
GLUCOSE BLDC GLUCOMTR-MCNC: 465 MG/DL — CRITICAL HIGH (ref 70–99)
GLUCOSE SERPL-MCNC: 223 MG/DL — HIGH (ref 70–99)
HCT VFR BLD CALC: 38.6 % — SIGNIFICANT CHANGE UP (ref 34.5–45)
HGB BLD-MCNC: 11.4 G/DL — LOW (ref 11.5–15.5)
HYPOCHROMIA BLD QL: SLIGHT — SIGNIFICANT CHANGE UP
LYMPHOCYTES # BLD AUTO: 1.2 K/UL — SIGNIFICANT CHANGE UP (ref 1–3.3)
LYMPHOCYTES # BLD AUTO: 12.8 % — LOW (ref 13–44)
MACROCYTES BLD QL: SLIGHT — SIGNIFICANT CHANGE UP
MAGNESIUM SERPL-MCNC: 1.7 MG/DL — SIGNIFICANT CHANGE UP (ref 1.6–2.6)
MANUAL SMEAR VERIFICATION: SIGNIFICANT CHANGE UP
MCHC RBC-ENTMCNC: 29.1 PG — SIGNIFICANT CHANGE UP (ref 27–34)
MCHC RBC-ENTMCNC: 29.5 GM/DL — LOW (ref 32–36)
MCV RBC AUTO: 98.5 FL — SIGNIFICANT CHANGE UP (ref 80–100)
METAMYELOCYTES # FLD: 0.9 % — HIGH (ref 0–0)
MICROCYTES BLD QL: SLIGHT — SIGNIFICANT CHANGE UP
MONOCYTES # BLD AUTO: 0 K/UL — SIGNIFICANT CHANGE UP (ref 0–0.9)
MONOCYTES NFR BLD AUTO: 0 % — LOW (ref 2–14)
NEUTROPHILS # BLD AUTO: 7.91 K/UL — HIGH (ref 1.8–7.4)
NEUTROPHILS NFR BLD AUTO: 82.9 % — HIGH (ref 43–77)
NEUTS BAND # BLD: 1.7 % — SIGNIFICANT CHANGE UP (ref 0–8)
NRBC # BLD: 1 /100 — HIGH (ref 0–0)
NRBC # BLD: SIGNIFICANT CHANGE UP /100 WBCS (ref 0–0)
PHOSPHATE SERPL-MCNC: 2.4 MG/DL — LOW (ref 2.5–4.5)
PLAT MORPH BLD: ABNORMAL
PLATELET # BLD AUTO: 213 K/UL — SIGNIFICANT CHANGE UP (ref 150–400)
POLYCHROMASIA BLD QL SMEAR: SLIGHT — SIGNIFICANT CHANGE UP
POTASSIUM SERPL-MCNC: 4.7 MMOL/L — SIGNIFICANT CHANGE UP (ref 3.5–5.3)
POTASSIUM SERPL-SCNC: 4.7 MMOL/L — SIGNIFICANT CHANGE UP (ref 3.5–5.3)
PROT SERPL-MCNC: 6.6 G/DL — SIGNIFICANT CHANGE UP (ref 6–8.3)
RBC # BLD: 3.92 M/UL — SIGNIFICANT CHANGE UP (ref 3.8–5.2)
RBC # FLD: 15.4 % — HIGH (ref 10.3–14.5)
RBC BLD AUTO: ABNORMAL
SODIUM SERPL-SCNC: 131 MMOL/L — LOW (ref 135–145)
WBC # BLD: 9.35 K/UL — SIGNIFICANT CHANGE UP (ref 3.8–10.5)
WBC # FLD AUTO: 9.35 K/UL — SIGNIFICANT CHANGE UP (ref 3.8–10.5)

## 2023-10-14 PROCEDURE — 99232 SBSQ HOSP IP/OBS MODERATE 35: CPT | Mod: GC

## 2023-10-14 RX ADMIN — Medication 100 MILLIGRAM(S): at 06:12

## 2023-10-14 RX ADMIN — ATORVASTATIN CALCIUM 40 MILLIGRAM(S): 80 TABLET, FILM COATED ORAL at 22:24

## 2023-10-14 RX ADMIN — Medication 81 MILLIGRAM(S): at 12:51

## 2023-10-14 RX ADMIN — Medication 6: at 12:47

## 2023-10-14 RX ADMIN — Medication 9 UNIT(S): at 18:03

## 2023-10-14 RX ADMIN — ENOXAPARIN SODIUM 40 MILLIGRAM(S): 100 INJECTION SUBCUTANEOUS at 22:24

## 2023-10-14 RX ADMIN — Medication 1 MILLIGRAM(S): at 12:49

## 2023-10-14 RX ADMIN — AMLODIPINE BESYLATE 10 MILLIGRAM(S): 2.5 TABLET ORAL at 22:24

## 2023-10-14 RX ADMIN — LIDOCAINE 1 PATCH: 4 CREAM TOPICAL at 19:28

## 2023-10-14 RX ADMIN — Medication 9 UNIT(S): at 13:53

## 2023-10-14 RX ADMIN — Medication 9 UNIT(S): at 09:46

## 2023-10-14 RX ADMIN — LOSARTAN POTASSIUM 100 MILLIGRAM(S): 100 TABLET, FILM COATED ORAL at 22:24

## 2023-10-14 RX ADMIN — Medication 200 MILLIGRAM(S): at 18:04

## 2023-10-14 RX ADMIN — Medication 62.5 MILLIMOLE(S): at 13:53

## 2023-10-14 RX ADMIN — Medication 200 MILLIGRAM(S): at 06:13

## 2023-10-14 RX ADMIN — LIDOCAINE 1 PATCH: 4 CREAM TOPICAL at 12:47

## 2023-10-14 RX ADMIN — Medication 10 MILLIGRAM(S): at 06:12

## 2023-10-14 RX ADMIN — Medication 2: at 09:46

## 2023-10-14 RX ADMIN — PREGABALIN 1000 MICROGRAM(S): 225 CAPSULE ORAL at 12:48

## 2023-10-14 RX ADMIN — INSULIN GLARGINE 17 UNIT(S): 100 INJECTION, SOLUTION SUBCUTANEOUS at 22:24

## 2023-10-14 RX ADMIN — Medication 1: at 22:27

## 2023-10-14 RX ADMIN — Medication 2: at 18:03

## 2023-10-14 NOTE — PROGRESS NOTE ADULT - PROBLEM SELECTOR PLAN 2
Dx with NSCLC with mets to brain on last admission, started CRT, last chemo being 09/07 at [facility], unknown agent per pt, last RT completed on previous admission 07/2023    - Continue B12/folate supplementation  - Pt s/p 3 radiation treatments  - Spoke w/ outpatient physician Dr. Cathie Jeffrey ([phone]) stated pt is s/p one cycle of pembrolizumab in mid September and has been in and out of the hospital since. Believes the flares may be due to the chemo and will reassess the plan at her next appt. Dx with NSCLC with mets to brain on last admission, started CRT, [place] [facility] per [place], last RT completed on previous admission 07/2023    - Continue B12/folate supplementation  - Pt s/p 3 radiation treatments  - Spoke w/ outpatient physician Dr. Cathie Jeffrey ([phone]) stated pt is s/p one cycle of pembrolizumab in mid September and has been in and out of the hospital since. Believes the flares may be due to the chemo and will reassess the plan at her next appt.  - Pt would like to transfer care to [facility]; Outpatient follow up with Dr. Delaney

## 2023-10-14 NOTE — PROGRESS NOTE ADULT - PROBLEM SELECTOR PLAN 3
A1C results: 7.4 last admission 07/2023; endocrine consulted at that time deeming a finalized regimen of lispro 10 u before each meal; experienced nocturnal hypoglycemia and insulin regimen recently changed at [facility] given repeated hypoglycemia   BS >300  - Increase lispro to 8 TID and increased Lantus to 14  - iSS A1C results: 7.4 last admission 07/2023; endocrine consulted at that time deeming a finalized regimen of lispro 10 u before each meal; experienced nocturnal hypoglycemia and insulin regimen recently changed at [facility] given repeated hypoglycemia   BS normalizing 100-200s  - Increase lispro to 8 TID and increased Lantus to 14  - iSS

## 2023-10-14 NOTE — PROGRESS NOTE ADULT - ASSESSMENT
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

## 2023-10-14 NOTE — PROGRESS NOTE ADULT - SUBJECTIVE AND OBJECTIVE BOX
CC: Patient is a 73y old  Female who presents with a chief complaint of RA flare      INTERVAL EVENTS: CYNDY  SUBJECTIVE / INTERVAL HPI: Patient seen and examined at bedside.   ROS: negative unless otherwise stated above.    VITAL SIGNS:  Vital Signs Last 24 Hrs  T(C): 36.8 (14 Oct 2023 05:54), Max: 36.9 (13 Oct 2023 09:57)  T(F): 98.3 (14 Oct 2023 05:54), Max: 98.5 (13 Oct 2023 09:57)  HR: 68 (14 Oct 2023 05:54) (55 - 68)  BP: 135/72 (14 Oct 2023 05:54) (104/62 - 143/94)  BP(mean): --  RR: 18 (14 Oct 2023 05:54) (18 - 20)  SpO2: 98% (14 Oct 2023 05:54) (96% - 98%)    Parameters below as of 14 Oct 2023 05:54  Patient On (Oxygen Delivery Method): room air            PHYSICAL EXAM:  General: in no acute distress  Lungs: CTA B/L. No wheezes, rales, or rhonchi  Cardiovascular: RRR. No murmurs, rubs, or gallops  Abdomen: Soft, non-tender non-distended; No rebound or guarding  Extremities: WWP, No clubbing, cyanosis or edema  MSK: ulnar deviation b/l   Neurological: Alert and oriented x3  Skin: port in place; c/d/i    MEDICATIONS:  MEDICATIONS  (STANDING):  amLODIPine   Tablet 10 milliGRAM(s) Oral every 24 hours  aspirin  chewable 81 milliGRAM(s) Oral daily  atorvastatin 40 milliGRAM(s) Oral at bedtime  cyanocobalamin 1000 MICROGram(s) Oral daily  dextrose 5%. 1000 milliLiter(s) (50 mL/Hr) IV Continuous <Continuous>  dextrose 5%. 1000 milliLiter(s) (100 mL/Hr) IV Continuous <Continuous>  dextrose 50% Injectable 25 Gram(s) IV Push once  dextrose 50% Injectable 12.5 Gram(s) IV Push once  dextrose 50% Injectable 25 Gram(s) IV Push once  dextrose 50% Injectable 25 Gram(s) IV Push once  enoxaparin Injectable 40 milliGRAM(s) SubCutaneous every 24 hours  folic acid 1 milliGRAM(s) Oral daily  glucagon  Injectable 1 milliGRAM(s) IntraMuscular once  hydroxychloroquine 200 milliGRAM(s) Oral two times a day  influenza  Vaccine (HIGH DOSE) 0.7 milliLiter(s) IntraMuscular once  insulin glargine Injectable (LANTUS) 17 Unit(s) SubCutaneous at bedtime  insulin lispro (ADMELOG) corrective regimen sliding scale   SubCutaneous Before meals and at bedtime  insulin lispro Injectable (ADMELOG) 9 Unit(s) SubCutaneous three times a day before meals  lidocaine   4% Patch 1 Patch Transdermal every 24 hours  losartan 100 milliGRAM(s) Oral every 24 hours  metoprolol succinate  milliGRAM(s) Oral daily  polyethylene glycol 3350 17 Gram(s) Oral daily  predniSONE   Tablet 10 milliGRAM(s) Oral daily  senna 2 Tablet(s) Oral at bedtime  SULFASALAZINE 50 DELAYED RELEASE TABLETS 2 Tablet(s) 2 Tablet(s) Oral every 12 hours    MEDICATIONS  (PRN):  acetaminophen     Tablet .. 650 milliGRAM(s) Oral every 6 hours PRN Temp greater or equal to 38C (100.4F), Mild Pain (1 - 3)  aluminum hydroxide/magnesium hydroxide/simethicone Suspension 30 milliLiter(s) Oral every 6 hours PRN Dyspepsia  dextrose Oral Gel 15 Gram(s) Oral once PRN Blood Glucose LESS THAN 70 milliGRAM(s)/deciliter  melatonin 3 milliGRAM(s) Oral at bedtime PRN Insomnia      ALLERGIES:  Allergies    Bactrim (Other)    Intolerances        LABS:                        11.2   13.64 )-----------( 279      ( 12 Oct 2023 09:24 )             34.5     10-12    132<L>  |  102  |  25<H>  ----------------------------<  234<H>  4.4   |  23  |  0.96    Ca    8.8      12 Oct 2023 09:24  Phos  2.6     10-12  Mg     1.9     10-12    TPro  6.5  /  Alb  3.0<L>  /  TBili  0.2  /  DBili  x   /  AST  19  /  ALT  15  /  AlkPhos  77  10-12      Urinalysis Basic - ( 12 Oct 2023 09:24 )    Color: x / Appearance: x / SG: x / pH: x  Gluc: 234 mg/dL / Ketone: x  / Bili: x / Urobili: x   Blood: x / Protein: x / Nitrite: x   Leuk Esterase: x / RBC: x / WBC x   Sq Epi: x / Non Sq Epi: x / Bacteria: x      CAPILLARY BLOOD GLUCOSE      POCT Blood Glucose.: 167 mg/dL (14 Oct 2023 06:26)      RADIOLOGY & ADDITIONAL TESTS: Reviewed.

## 2023-10-15 LAB
GLUCOSE BLDC GLUCOMTR-MCNC: 178 MG/DL — HIGH (ref 70–99)
GLUCOSE BLDC GLUCOMTR-MCNC: 202 MG/DL — HIGH (ref 70–99)
GLUCOSE BLDC GLUCOMTR-MCNC: 231 MG/DL — HIGH (ref 70–99)
GLUCOSE BLDC GLUCOMTR-MCNC: 264 MG/DL — HIGH (ref 70–99)

## 2023-10-15 PROCEDURE — 99232 SBSQ HOSP IP/OBS MODERATE 35: CPT

## 2023-10-15 RX ORDER — INSULIN LISPRO 100/ML
11 VIAL (ML) SUBCUTANEOUS
Refills: 0 | Status: DISCONTINUED | OUTPATIENT
Start: 2023-10-15 | End: 2023-10-17

## 2023-10-15 RX ORDER — INSULIN GLARGINE 100 [IU]/ML
20 INJECTION, SOLUTION SUBCUTANEOUS AT BEDTIME
Refills: 0 | Status: DISCONTINUED | OUTPATIENT
Start: 2023-10-15 | End: 2023-10-17

## 2023-10-15 RX ORDER — INSULIN LISPRO 100/ML
11 VIAL (ML) SUBCUTANEOUS ONCE
Refills: 0 | Status: COMPLETED | OUTPATIENT
Start: 2023-10-15 | End: 2023-10-15

## 2023-10-15 RX ADMIN — ENOXAPARIN SODIUM 40 MILLIGRAM(S): 100 INJECTION SUBCUTANEOUS at 22:16

## 2023-10-15 RX ADMIN — Medication 100 MILLIGRAM(S): at 05:53

## 2023-10-15 RX ADMIN — LIDOCAINE 1 PATCH: 4 CREAM TOPICAL at 22:32

## 2023-10-15 RX ADMIN — AMLODIPINE BESYLATE 10 MILLIGRAM(S): 2.5 TABLET ORAL at 22:16

## 2023-10-15 RX ADMIN — Medication 10 MILLIGRAM(S): at 05:53

## 2023-10-15 RX ADMIN — Medication 9 UNIT(S): at 10:12

## 2023-10-15 RX ADMIN — LIDOCAINE 1 PATCH: 4 CREAM TOPICAL at 11:47

## 2023-10-15 RX ADMIN — Medication 3: at 22:12

## 2023-10-15 RX ADMIN — Medication 5 MILLIGRAM(S): at 18:47

## 2023-10-15 RX ADMIN — Medication 11 UNIT(S): at 18:04

## 2023-10-15 RX ADMIN — Medication 200 MILLIGRAM(S): at 05:53

## 2023-10-15 RX ADMIN — LOSARTAN POTASSIUM 100 MILLIGRAM(S): 100 TABLET, FILM COATED ORAL at 22:16

## 2023-10-15 RX ADMIN — PREGABALIN 1000 MICROGRAM(S): 225 CAPSULE ORAL at 11:47

## 2023-10-15 RX ADMIN — Medication 1: at 18:05

## 2023-10-15 RX ADMIN — Medication 2: at 14:46

## 2023-10-15 RX ADMIN — LIDOCAINE 1 PATCH: 4 CREAM TOPICAL at 19:57

## 2023-10-15 RX ADMIN — ATORVASTATIN CALCIUM 40 MILLIGRAM(S): 80 TABLET, FILM COATED ORAL at 22:16

## 2023-10-15 RX ADMIN — Medication 81 MILLIGRAM(S): at 11:46

## 2023-10-15 RX ADMIN — Medication 11 UNIT(S): at 14:09

## 2023-10-15 RX ADMIN — Medication 2: at 10:11

## 2023-10-15 RX ADMIN — SENNA PLUS 2 TABLET(S): 8.6 TABLET ORAL at 22:16

## 2023-10-15 RX ADMIN — INSULIN GLARGINE 20 UNIT(S): 100 INJECTION, SOLUTION SUBCUTANEOUS at 22:15

## 2023-10-15 RX ADMIN — Medication 1 MILLIGRAM(S): at 13:20

## 2023-10-15 RX ADMIN — Medication 200 MILLIGRAM(S): at 18:05

## 2023-10-15 NOTE — PROGRESS NOTE ADULT - PROBLEM SELECTOR PLAN 3
A1C results: 7.4 last admission 07/2023; endocrine consulted at that time deeming a finalized regimen of lispro 10 u before each meal; experienced nocturnal hypoglycemia and insulin regimen recently changed at [facility] given repeated hypoglycemia   BS normalizing 100-200s  - Increase lispro to 8 TID and increased Lantus to 14  - iSS  Increase insulin BS is uncontrolled but she is also on taper steroids

## 2023-10-15 NOTE — PROGRESS NOTE ADULT - PROBLEM SELECTOR PLAN 5
home meds: meds, amlodipine 10mg, losartan 100mg, Toprol 100mg  BP is controlled  -Continue home meds

## 2023-10-15 NOTE — PROGRESS NOTE ADULT - SUBJECTIVE AND OBJECTIVE BOX
Interval Events: Reviewed  Patient seen and examined at bedside.    Patient is a 73y old  Female who presents with a chief complaint of RA flare (17 Oct 2023 08:22)  she is doing well    PAST MEDICAL & SURGICAL HISTORY:  HTN (hypertension)      HLD (hyperlipidemia)      DM (diabetes mellitus), type 2      Rheumatoid arthritis      Stage 4 chronic kidney disease      Degenerative joint disease (DJD) of lumbar spine      Osteopenia      S/P total right hip arthroplasty          MEDICATIONS:  Pulmonary:    Antimicrobials:  hydroxychloroquine 200 milliGRAM(s) Oral two times a day    Anticoagulants:  aspirin  chewable 81 milliGRAM(s) Oral daily  enoxaparin Injectable 40 milliGRAM(s) SubCutaneous every 24 hours    Cardiac:  amLODIPine   Tablet 10 milliGRAM(s) Oral every 24 hours  losartan 100 milliGRAM(s) Oral every 24 hours  metoprolol succinate  milliGRAM(s) Oral daily      Allergies    Bactrim (Other)    Intolerances        Vital Signs Last 24 Hrs  T(C): 36.5 (17 Oct 2023 05:06), Max: 37.1 (16 Oct 2023 21:09)  T(F): 97.7 (17 Oct 2023 05:06), Max: 98.8 (16 Oct 2023 21:09)  HR: 69 (17 Oct 2023 05:06) (60 - 71)  BP: 118/71 (17 Oct 2023 05:06) (103/68 - 149/73)  BP(mean): 86 (17 Oct 2023 05:06) (86 - 99)  RR: 18 (17 Oct 2023 05:06) (18 - 18)  SpO2: 98% (17 Oct 2023 05:06) (94% - 98%)    Parameters below as of 16 Oct 2023 21:09  Patient On (Oxygen Delivery Method): room air        10-16 @ 07:01  -  10-17 @ 07:00  --------------------------------------------------------  IN: 0 mL / OUT: 250 mL / NET: -250 mL          Review of Systems:   •	General: negative  •	Skin/Breast: negative  •	Ophthalmologic: negative  •	ENMT: negative  •	Respiratory and Thorax: negative  •	Cardiovascular: negative  •	Gastrointestinal: negative  •	Genitourinary: negative  •	Musculoskeletal: negative  •	Neurological: negative  •	Psychiatric: negative  •	Hematology/Lymphatics: negative  •	Endocrine: negative  •	Allergic/Immunologic: negative    Physical Exam:   • Constitutional:	refer to the dietion /Nutritionist note  • Eyes:	EOMI; PERRL; no drainage or redness  • ENMT:	No oral lesions; no gross abnormalities  • Neck	No bruits; no thyromegaly or nodules  • Breasts:	not examined  • Back:	No deformity or limitation of movement  • Respiratory:	Breath Sounds equal & clear to percussion & auscultation, no accessory muscle use  • Cardiovascular:	Regular rate & rhythm, normal S1, S2; no murmurs, gallops or rubs; no S3, S4  • Gastrointestinal:	Soft, non-tender, no hepatosplenomegaly, normal bowel sounds  • Genitourinary:	not examined  • Rectal: not examined  • Extremities:	No cyanosis, clubbing or edema  • Vascular:	Equal and normal pulses (carotid, femoral, dorsalis pedis)  • Neurologica:l	not examined  • Skin:	No lesions; no rash  • Lymph Nodes:	No lymphadedenopathy  • Musculoskeletal:	No joint pain, swelling or deformity; no limitation of movement        LABS:      CBC Full  -  ( 17 Oct 2023 05:30 )  WBC Count : 13.42 K/uL  RBC Count : 3.74 M/uL  Hemoglobin : 10.9 g/dL  Hematocrit : 35.1 %  Platelet Count - Automated : 251 K/uL  Mean Cell Volume : 93.9 fl  Mean Cell Hemoglobin : 29.1 pg  Mean Cell Hemoglobin Concentration : 31.1 gm/dL  Auto Neutrophil # : 9.33 K/uL  Auto Lymphocyte # : 2.31 K/uL  Auto Monocyte # : 1.04 K/uL  Auto Eosinophil # : 0.25 K/uL  Auto Basophil # : 0.12 K/uL  Auto Neutrophil % : 69.5 %  Auto Lymphocyte % : 17.2 %  Auto Monocyte % : 7.7 %  Auto Eosinophil % : 1.9 %  Auto Basophil % : 0.9 %    10-17    130<L>  |  100  |  28<H>  ----------------------------<  161<H>  4.0   |  23  |  0.98    Ca    9.4      17 Oct 2023 05:30  Phos  3.5     10-17  Mg     1.5     10-17    TPro  6.1  /  Alb  3.2<L>  /  TBili  0.2  /  DBili  x   /  AST  23  /  ALT  21  /  AlkPhos  109  10-17          Urinalysis Basic - ( 17 Oct 2023 05:30 )    Color: x / Appearance: x / SG: x / pH: x  Gluc: 161 mg/dL / Ketone: x  / Bili: x / Urobili: x   Blood: x / Protein: x / Nitrite: x   Leuk Esterase: x / RBC: x / WBC x   Sq Epi: x / Non Sq Epi: x / Bacteria: x                  RADIOLOGY & ADDITIONAL STUDIES (The following images were personally reviewed):

## 2023-10-15 NOTE — PROGRESS NOTE ADULT - PROBLEM SELECTOR PLAN 1
Intermittent flares, now with b/l joint, arm, and shoulder pain c/b inability to get out of bed today 2/2 pain. Currently well controlled after steroids/tylenol. Home meds: sulfasalazine and plaquenil; currently on sulfasalzine 1000 mg BID (recently increased from 500 mg BID) and plaquenil 200 mg BID   ESR 55, CRP 13.6  - C/w home Sulfasalazine and Plaquenil  - Completed 5 days 20 mg prednisone (last day 10/12)  - Steroid taper of 5 mg   - Pain regimen: Tylenol   - PT/OT

## 2023-10-15 NOTE — PROGRESS NOTE ADULT - PROBLEM SELECTOR PLAN 2
Dx with NSCLC with mets to brain on last admission, started CRT, last [place] 09[place]07 [place] [facility] [place], unknown agent per pt, last RT completed on previous admission 07/2023    - Continue B12/folate supplementation  - Pt s/p 3 radiation treatments  - Spoke w/ outpatient physician Dr. Cathie Jeffrey ([phone]) stated pt is s/p one cycle of pembrolizumab in mid September and has been in and out of the hospital since. Believes the flares may be due to the chemo and will reassess the plan at her next appt.  - Pt would like to transfer care to [facility]; Outpatient follow up with Dr. Delaney

## 2023-10-15 NOTE — PROGRESS NOTE ADULT - ASSESSMENT
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

## 2023-10-16 ENCOUNTER — NON-APPOINTMENT (OUTPATIENT)
Age: 73
End: 2023-10-16

## 2023-10-16 LAB
GLUCOSE BLDC GLUCOMTR-MCNC: 118 MG/DL — HIGH (ref 70–99)
GLUCOSE BLDC GLUCOMTR-MCNC: 118 MG/DL — HIGH (ref 70–99)
GLUCOSE BLDC GLUCOMTR-MCNC: 125 MG/DL — HIGH (ref 70–99)
GLUCOSE BLDC GLUCOMTR-MCNC: 125 MG/DL — HIGH (ref 70–99)
GLUCOSE BLDC GLUCOMTR-MCNC: 176 MG/DL — HIGH (ref 70–99)
GLUCOSE BLDC GLUCOMTR-MCNC: 219 MG/DL — HIGH (ref 70–99)

## 2023-10-16 PROCEDURE — 99232 SBSQ HOSP IP/OBS MODERATE 35: CPT | Mod: GC

## 2023-10-16 RX ADMIN — PREGABALIN 1000 MICROGRAM(S): 225 CAPSULE ORAL at 12:42

## 2023-10-16 RX ADMIN — Medication 1: at 13:54

## 2023-10-16 RX ADMIN — Medication 11 UNIT(S): at 13:54

## 2023-10-16 RX ADMIN — Medication 2: at 09:38

## 2023-10-16 RX ADMIN — AMLODIPINE BESYLATE 10 MILLIGRAM(S): 2.5 TABLET ORAL at 21:35

## 2023-10-16 RX ADMIN — ENOXAPARIN SODIUM 40 MILLIGRAM(S): 100 INJECTION SUBCUTANEOUS at 22:35

## 2023-10-16 RX ADMIN — Medication 1 MILLIGRAM(S): at 12:41

## 2023-10-16 RX ADMIN — LIDOCAINE 1 PATCH: 4 CREAM TOPICAL at 17:55

## 2023-10-16 RX ADMIN — INSULIN GLARGINE 20 UNIT(S): 100 INJECTION, SOLUTION SUBCUTANEOUS at 22:35

## 2023-10-16 RX ADMIN — Medication 100 MILLIGRAM(S): at 05:44

## 2023-10-16 RX ADMIN — Medication 11 UNIT(S): at 18:54

## 2023-10-16 RX ADMIN — Medication 5 MILLIGRAM(S): at 17:25

## 2023-10-16 RX ADMIN — Medication 11 UNIT(S): at 09:38

## 2023-10-16 RX ADMIN — LIDOCAINE 1 PATCH: 4 CREAM TOPICAL at 12:42

## 2023-10-16 RX ADMIN — Medication 81 MILLIGRAM(S): at 12:41

## 2023-10-16 RX ADMIN — Medication 5 MILLIGRAM(S): at 05:44

## 2023-10-16 RX ADMIN — Medication 200 MILLIGRAM(S): at 17:25

## 2023-10-16 RX ADMIN — LOSARTAN POTASSIUM 100 MILLIGRAM(S): 100 TABLET, FILM COATED ORAL at 21:35

## 2023-10-16 RX ADMIN — Medication 200 MILLIGRAM(S): at 05:43

## 2023-10-16 RX ADMIN — ATORVASTATIN CALCIUM 40 MILLIGRAM(S): 80 TABLET, FILM COATED ORAL at 22:34

## 2023-10-16 NOTE — PROGRESS NOTE ADULT - SUBJECTIVE AND OBJECTIVE BOX
INTERVAL HPI/OVERNIGHT EVENTS:  All notes reviewed;  Awake and alert  She is frightened about going home because of concerns about her strength and her ability to get around without help;  Remains on Prednisone and  patient feels it is helping her muscle strength   She cannot go home with present situation   She needs additional work with physical therapy prior to discharge   Glucoses remain increased        MEDICATIONS  (STANDING):  amLODIPine   Tablet 10 milliGRAM(s) Oral every 24 hours  aspirin  chewable 81 milliGRAM(s) Oral daily  atorvastatin 40 milliGRAM(s) Oral at bedtime  cyanocobalamin 1000 MICROGram(s) Oral daily  dextrose 5%. 1000 milliLiter(s) (50 mL/Hr) IV Continuous <Continuous>  dextrose 5%. 1000 milliLiter(s) (100 mL/Hr) IV Continuous <Continuous>  dextrose 50% Injectable 25 Gram(s) IV Push once  dextrose 50% Injectable 12.5 Gram(s) IV Push once  dextrose 50% Injectable 25 Gram(s) IV Push once  dextrose 50% Injectable 25 Gram(s) IV Push once  enoxaparin Injectable 40 milliGRAM(s) SubCutaneous every 24 hours  folic acid 1 milliGRAM(s) Oral daily  glucagon  Injectable 1 milliGRAM(s) IntraMuscular once  hydroxychloroquine 200 milliGRAM(s) Oral two times a day  influenza  Vaccine (HIGH DOSE) 0.7 milliLiter(s) IntraMuscular once  insulin glargine Injectable (LANTUS) 20 Unit(s) SubCutaneous at bedtime  insulin lispro (ADMELOG) corrective regimen sliding scale   SubCutaneous Before meals and at bedtime  insulin lispro Injectable (ADMELOG) 11 Unit(s) SubCutaneous three times a day before meals  lidocaine   4% Patch 1 Patch Transdermal every 24 hours  losartan 100 milliGRAM(s) Oral every 24 hours  metoprolol succinate  milliGRAM(s) Oral daily  polyethylene glycol 3350 17 Gram(s) Oral daily  predniSONE   Tablet 5 milliGRAM(s) Oral every 12 hours  senna 2 Tablet(s) Oral at bedtime  SULFASALAZINE 50 DELAYED RELEASE TABLETS 2 Tablet(s) 2 Tablet(s) Oral every 12 hours    MEDICATIONS  (PRN):  acetaminophen     Tablet .. 650 milliGRAM(s) Oral every 6 hours PRN Temp greater or equal to 38C (100.4F), Mild Pain (1 - 3)  aluminum hydroxide/magnesium hydroxide/simethicone Suspension 30 milliLiter(s) Oral every 6 hours PRN Dyspepsia  dextrose Oral Gel 15 Gram(s) Oral once PRN Blood Glucose LESS THAN 70 milliGRAM(s)/deciliter  melatonin 3 milliGRAM(s) Oral at bedtime PRN Insomnia      Allergies    Bactrim (Other)    Intolerances        Vital Signs Last 24 Hrs  T(C): 36.8 (16 Oct 2023 05:08), Max: 36.9 (15 Oct 2023 21:15)  T(F): 98.2 (16 Oct 2023 05:08), Max: 98.4 (15 Oct 2023 21:15)  HR: 62 (16 Oct 2023 05:08) (60 - 69)  BP: 155/81 (16 Oct 2023 05:08) (125/78 - 155/81)  BP(mean): --  RR: 19 (16 Oct 2023 05:08) (18 - 19)  SpO2: 98% (16 Oct 2023 05:08) (97% - 98%)    Parameters below as of 16 Oct 2023 05:08  Patient On (Oxygen Delivery Method): room air              Constitutional: Awake     Eyes: NEDRA    ENMT: Negative    Neck: Supple    Back:  no tenderness     Respiratory:  clear     Cardiovascular: S1 S2    Gastrointestinal: soft     Genitourinary:    Extremities:  no edema     Vascular:    Neurological:    Skin:    Lymph Nodes:            LABS:                        11.4   9.35  )-----------( 213      ( 14 Oct 2023 11:11 )             38.6     10-14    131<L>  |  102  |  22  ----------------------------<  223<H>  4.7   |  17<L>  |  0.89    Ca    9.0      14 Oct 2023 11:11  Phos  2.4     10-14  Mg     1.7     10-14    TPro  6.6  /  Alb  2.8<L>  /  TBili  0.3  /  DBili  x   /  AST  27  /  ALT  19  /  AlkPhos  96  10-14      Urinalysis Basic - ( 14 Oct 2023 11:11 )    Color: x / Appearance: x / SG: x / pH: x  Gluc: 223 mg/dL / Ketone: x  / Bili: x / Urobili: x   Blood: x / Protein: x / Nitrite: x   Leuk Esterase: x / RBC: x / WBC x   Sq Epi: x / Non Sq Epi: x / Bacteria: x        RADIOLOGY & ADDITIONAL TESTS:

## 2023-10-16 NOTE — PROGRESS NOTE ADULT - ASSESSMENT
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

## 2023-10-16 NOTE — PROGRESS NOTE ADULT - PROBLEM SELECTOR PLAN 3
A1C results: 7.4 last admission 07/2023; endocrine consulted at that time deeming a finalized regimen of lispro 10 u before each meal; experienced nocturnal hypoglycemia and insulin regimen recently changed at [facility] given repeated hypoglycemia   BS normalizing 100-200s  One episode of BS in 400s - pt ate cupcake at bedside  - Increase lispro to 11 TID and increased Lantus to 22  - iSS

## 2023-10-16 NOTE — PROGRESS NOTE ADULT - SUBJECTIVE AND OBJECTIVE BOX
Internal Medicine Progress Note  Marylin Charlton, PGY-1  Pager: [phone]    ******INCOMPLETE******    Patient is a 73y old  Female who presents with a chief complaint of RA flare (14 Oct 2023 08:13)    OVERNIGHT EVENTS/INTERVAL HPI:    REVIEW OF SYSTEMS:  All other review of systems is negative unless indicated above.    OBJECTIVE:  T(C): 36.8 (10-16-23 @ 05:08), Max: 37 (10-15-23 @ 09:37)  HR: 62 (10-16-23 @ 05:08) (60 - 77)  BP: 155/81 (10-16-23 @ 05:08) (125/78 - 155/81)  RR: 19 (10-16-23 @ 05:08) (18 - 19)  SpO2: 98% (10-16-23 @ 05:08) (97% - 99%)  Daily     Daily     Physical Exam:  General: in no acute distress  Eyes: EOMI intact bilaterally. Anicteric sclerae, moist conjunctivae  HENT: Moist mucous membranes  Neck: Trachea midline, supple  Lungs: CTA B/L. No wheezes, rales, or rhonchi  Cardiovascular: RRR. No murmurs, rubs, or gallops  Abdomen: Soft, non-tender non-distended; No rebound or guarding  Extremities: WWP, No clubbing, cyanosis or edema  MSK: No midline bony tenderness. No CVA tenderness bilaterally  Neurological: Alert and oriented x3  Skin: Warm and dry. No obvious rash     Medications:  MEDICATIONS  (STANDING):  amLODIPine   Tablet 10 milliGRAM(s) Oral every 24 hours  aspirin  chewable 81 milliGRAM(s) Oral daily  atorvastatin 40 milliGRAM(s) Oral at bedtime  cyanocobalamin 1000 MICROGram(s) Oral daily  dextrose 5%. 1000 milliLiter(s) (50 mL/Hr) IV Continuous <Continuous>  dextrose 5%. 1000 milliLiter(s) (100 mL/Hr) IV Continuous <Continuous>  dextrose 50% Injectable 25 Gram(s) IV Push once  dextrose 50% Injectable 25 Gram(s) IV Push once  dextrose 50% Injectable 25 Gram(s) IV Push once  dextrose 50% Injectable 12.5 Gram(s) IV Push once  enoxaparin Injectable 40 milliGRAM(s) SubCutaneous every 24 hours  folic acid 1 milliGRAM(s) Oral daily  glucagon  Injectable 1 milliGRAM(s) IntraMuscular once  hydroxychloroquine 200 milliGRAM(s) Oral two times a day  influenza  Vaccine (HIGH DOSE) 0.7 milliLiter(s) IntraMuscular once  insulin glargine Injectable (LANTUS) 20 Unit(s) SubCutaneous at bedtime  insulin lispro (ADMELOG) corrective regimen sliding scale   SubCutaneous Before meals and at bedtime  insulin lispro Injectable (ADMELOG) 11 Unit(s) SubCutaneous three times a day before meals  lidocaine   4% Patch 1 Patch Transdermal every 24 hours  losartan 100 milliGRAM(s) Oral every 24 hours  metoprolol succinate  milliGRAM(s) Oral daily  polyethylene glycol 3350 17 Gram(s) Oral daily  predniSONE   Tablet 5 milliGRAM(s) Oral every 12 hours  senna 2 Tablet(s) Oral at bedtime  SULFASALAZINE 50 DELAYED RELEASE TABLETS 2 Tablet(s) 2 Tablet(s) Oral every 12 hours    MEDICATIONS  (PRN):  acetaminophen     Tablet .. 650 milliGRAM(s) Oral every 6 hours PRN Temp greater or equal to 38C (100.4F), Mild Pain (1 - 3)  aluminum hydroxide/magnesium hydroxide/simethicone Suspension 30 milliLiter(s) Oral every 6 hours PRN Dyspepsia  dextrose Oral Gel 15 Gram(s) Oral once PRN Blood Glucose LESS THAN 70 milliGRAM(s)/deciliter  melatonin 3 milliGRAM(s) Oral at bedtime PRN Insomnia      Labs:                        11.4   9.35  )-----------( 213      ( 14 Oct 2023 11:11 )             38.6     10-14    131<L>  |  102  |  22  ----------------------------<  223<H>  4.7   |  17<L>  |  0.89    Ca    9.0      14 Oct 2023 11:11  Phos  2.4     10-14  Mg     1.7     10-14    TPro  6.6  /  Alb  2.8<L>  /  TBili  0.3  /  DBili  x   /  AST  27  /  ALT  19  /  AlkPhos  96  10-14      Urinalysis Basic - ( 14 Oct 2023 11:11 )    Color: x / Appearance: x / SG: x / pH: x  Gluc: 223 mg/dL / Ketone: x  / Bili: x / Urobili: x   Blood: x / Protein: x / Nitrite: x   Leuk Esterase: x / RBC: x / WBC x   Sq Epi: x / Non Sq Epi: x / Bacteria: x      COVID-19 PCR: NotDetec (21 Jul 2023 19:49)  COVID-19 PCR: NotDetec (13 Jul 2023 05:30)      Radiology: Reviewed Patient is a 73y old  Female who presents with a chief complaint of RA flare (14 Oct 2023 08:13)    OVERNIGHT EVENTS/INTERVAL HPI: No acute events overnight. Pt seen and examined at bedside. Denies chest pain, SOB, abdominal pain, dysuria or diarrhea.     REVIEW OF SYSTEMS:  All other review of systems is negative unless indicated above.    OBJECTIVE:  T(C): 36.8 (10-16-23 @ 05:08), Max: 37 (10-15-23 @ 09:37)  HR: 62 (10-16-23 @ 05:08) (60 - 77)  BP: 155/81 (10-16-23 @ 05:08) (125/78 - 155/81)  RR: 19 (10-16-23 @ 05:08) (18 - 19)  SpO2: 98% (10-16-23 @ 05:08) (97% - 99%)  Daily     Daily     Physical Exam:  General: in no acute distress  Lungs: CTA B/L. No wheezes, rales, or rhonchi  Cardiovascular: RRR. No murmurs, rubs, or gallops  Abdomen: Soft, non-tender non-distended; No rebound or guarding  Extremities: WWP, No clubbing, cyanosis or edema  MSK: +ulnar deviation  Neurological: Alert and oriented x3  Skin: Warm and dry. No obvious rash     Medications:  MEDICATIONS  (STANDING):  amLODIPine   Tablet 10 milliGRAM(s) Oral every 24 hours  aspirin  chewable 81 milliGRAM(s) Oral daily  atorvastatin 40 milliGRAM(s) Oral at bedtime  cyanocobalamin 1000 MICROGram(s) Oral daily  dextrose 5%. 1000 milliLiter(s) (50 mL/Hr) IV Continuous <Continuous>  dextrose 5%. 1000 milliLiter(s) (100 mL/Hr) IV Continuous <Continuous>  dextrose 50% Injectable 25 Gram(s) IV Push once  dextrose 50% Injectable 25 Gram(s) IV Push once  dextrose 50% Injectable 25 Gram(s) IV Push once  dextrose 50% Injectable 12.5 Gram(s) IV Push once  enoxaparin Injectable 40 milliGRAM(s) SubCutaneous every 24 hours  folic acid 1 milliGRAM(s) Oral daily  glucagon  Injectable 1 milliGRAM(s) IntraMuscular once  hydroxychloroquine 200 milliGRAM(s) Oral two times a day  influenza  Vaccine (HIGH DOSE) 0.7 milliLiter(s) IntraMuscular once  insulin glargine Injectable (LANTUS) 20 Unit(s) SubCutaneous at bedtime  insulin lispro (ADMELOG) corrective regimen sliding scale   SubCutaneous Before meals and at bedtime  insulin lispro Injectable (ADMELOG) 11 Unit(s) SubCutaneous three times a day before meals  lidocaine   4% Patch 1 Patch Transdermal every 24 hours  losartan 100 milliGRAM(s) Oral every 24 hours  metoprolol succinate  milliGRAM(s) Oral daily  polyethylene glycol 3350 17 Gram(s) Oral daily  predniSONE   Tablet 5 milliGRAM(s) Oral every 12 hours  senna 2 Tablet(s) Oral at bedtime  SULFASALAZINE 50 DELAYED RELEASE TABLETS 2 Tablet(s) 2 Tablet(s) Oral every 12 hours    MEDICATIONS  (PRN):  acetaminophen     Tablet .. 650 milliGRAM(s) Oral every 6 hours PRN Temp greater or equal to 38C (100.4F), Mild Pain (1 - 3)  aluminum hydroxide/magnesium hydroxide/simethicone Suspension 30 milliLiter(s) Oral every 6 hours PRN Dyspepsia  dextrose Oral Gel 15 Gram(s) Oral once PRN Blood Glucose LESS THAN 70 milliGRAM(s)/deciliter  melatonin 3 milliGRAM(s) Oral at bedtime PRN Insomnia      Labs:                        11.4   9.35  )-----------( 213      ( 14 Oct 2023 11:11 )             38.6     10-14    131<L>  |  102  |  22  ----------------------------<  223<H>  4.7   |  17<L>  |  0.89    Ca    9.0      14 Oct 2023 11:11  Phos  2.4     10-14  Mg     1.7     10-14    TPro  6.6  /  Alb  2.8<L>  /  TBili  0.3  /  DBili  x   /  AST  27  /  ALT  19  /  AlkPhos  96  10-14      Urinalysis Basic - ( 14 Oct 2023 11:11 )    Color: x / Appearance: x / SG: x / pH: x  Gluc: 223 mg/dL / Ketone: x  / Bili: x / Urobili: x   Blood: x / Protein: x / Nitrite: x   Leuk Esterase: x / RBC: x / WBC x   Sq Epi: x / Non Sq Epi: x / Bacteria: x      COVID-19 PCR: NotDetec (21 Jul 2023 19:49)  COVID-19 PCR: NotDetec (13 Jul 2023 05:30)      Radiology: Reviewed

## 2023-10-16 NOTE — PROGRESS NOTE ADULT - PROBLEM SELECTOR PLAN 1
Intermittent flares, now with b/l joint, arm, and shoulder pain c/b inability to get out of bed today 2/2 pain. Currently well controlled after steroids/tylenol. Home meds: sulfasalazine and plaquenil; currently on sulfasalzine 1000 mg BID (recently increased from 500 mg BID) and plaquenil 200 mg BID   ESR 55, CRP 13.6  - C/w home Sulfasalazine and Plaquenil  - Completed 5 days 20 mg prednisone (last day 10/12)  - Steroid taper of 10 mg   - Pain regimen: Tylenol   - PT/OT - recommend home PT Intermittent flares, now with b/l joint, arm, and shoulder pain c/b inability to get out of bed today 2/2 pain. Currently well controlled after steroids/tylenol. Home meds: sulfasalazine and plaquenil; currently on sulfasalzine 1000 mg BID (recently increased from 500 mg BID) and plaquenil 200 mg BID   ESR 55, CRP 13.6  - C/w home Sulfasalazine and Plaquenil  - Completed 5 days 20 mg prednisone (last day 10/12)  - Decrease steroid taper from 10 mg to 5 mg  - Pain regimen: Tylenol   - PT/OT - recommend home PT Intermittent flares, now with b/l joint, arm, and shoulder pain c/b inability to get out of bed today 2/2 pain. Currently well controlled after steroids/tylenol. Home meds: sulfasalazine and plaquenil; currently on sulfasalzine 1000 mg BID (recently increased from 500 mg BID) and plaquenil 200 mg BID   ESR 55, CRP 13.6  - C/w home Sulfasalazine and Plaquenil  - Completed 5 days 20 mg prednisone (last day 10/12)  - C/w steroid taper prednisone 10 mg  - Pain regimen: Tylenol   - PT/OT - recommend home PT

## 2023-10-16 NOTE — PROGRESS NOTE ADULT - PROBLEM SELECTOR PLAN 2
Dx with NSCLC with mets to brain on last admission, started CRT, last chemo being 09/07 at [facility] [place], unknown agent per pt, last RT completed on previous admission 07/2023    - Continue B12/folate supplementation  - Pt s/p 3 radiation treatments  - Spoke w/ outpatient physician Dr. Cathie Jeffrey ([phone]) stated pt is s/p one cycle of pembrolizumab in mid September and has been in and out of the hospital since. Believes the flares may be due to the chemo and will reassess the plan at her next appt.  - Pt would like to transfer care to [facility]; Outpatient follow up with Dr. Delaney

## 2023-10-16 NOTE — CHART NOTE - NSCHARTNOTEFT_GEN_A_CORE
Admitting Diagnosis:   Patient is a 73y old  Female who presents with a chief complaint of RA flare (16 Oct 2023 08:00)      PAST MEDICAL & SURGICAL HISTORY:  HTN (hypertension)  HLD (hyperlipidemia)  DM (diabetes mellitus), type 2  Rheumatoid arthritis  Stage 4 chronic kidney disease  Degenerative joint disease (DJD) of lumbar spine  Osteopenia  S/P total right hip arthroplasty      Current Nutrition Order:        PO Intake: Good (%) [   ]  Fair (50-75%) [   ] Poor (<25%) [   ]    GI Issues:     Pain:    Skin Integrity:    Labs:   10-14    131<L>  |  102  |  22  ----------------------------<  223<H>  4.7   |  17<L>  |  0.89    Ca    9.0      14 Oct 2023 11:11  Phos  2.4     10-14  Mg     1.7     10-14    TPro  6.6  /  Alb  2.8<L>  /  TBili  0.3  /  DBili  x   /  AST  27  /  ALT  19  /  AlkPhos  96  10-14    CAPILLARY BLOOD GLUCOSE      POCT Blood Glucose.: 219 mg/dL (16 Oct 2023 09:31)  POCT Blood Glucose.: 264 mg/dL (15 Oct 2023 22:10)  POCT Blood Glucose.: 178 mg/dL (15 Oct 2023 17:28)  POCT Blood Glucose.: 231 mg/dL (15 Oct 2023 14:02)  POCT Blood Glucose.: 202 mg/dL (15 Oct 2023 10:04)      Medications:  MEDICATIONS  (STANDING):  amLODIPine   Tablet 10 milliGRAM(s) Oral every 24 hours  aspirin  chewable 81 milliGRAM(s) Oral daily  atorvastatin 40 milliGRAM(s) Oral at bedtime  cyanocobalamin 1000 MICROGram(s) Oral daily  dextrose 5%. 1000 milliLiter(s) (50 mL/Hr) IV Continuous <Continuous>  dextrose 5%. 1000 milliLiter(s) (100 mL/Hr) IV Continuous <Continuous>  dextrose 50% Injectable 25 Gram(s) IV Push once  dextrose 50% Injectable 25 Gram(s) IV Push once  dextrose 50% Injectable 12.5 Gram(s) IV Push once  dextrose 50% Injectable 25 Gram(s) IV Push once  enoxaparin Injectable 40 milliGRAM(s) SubCutaneous every 24 hours  folic acid 1 milliGRAM(s) Oral daily  glucagon  Injectable 1 milliGRAM(s) IntraMuscular once  hydroxychloroquine 200 milliGRAM(s) Oral two times a day  influenza  Vaccine (HIGH DOSE) 0.7 milliLiter(s) IntraMuscular once  insulin glargine Injectable (LANTUS) 20 Unit(s) SubCutaneous at bedtime  insulin lispro (ADMELOG) corrective regimen sliding scale   SubCutaneous Before meals and at bedtime  insulin lispro Injectable (ADMELOG) 11 Unit(s) SubCutaneous three times a day before meals  lidocaine   4% Patch 1 Patch Transdermal every 24 hours  losartan 100 milliGRAM(s) Oral every 24 hours  metoprolol succinate  milliGRAM(s) Oral daily  polyethylene glycol 3350 17 Gram(s) Oral daily  predniSONE   Tablet 5 milliGRAM(s) Oral every 12 hours  senna 2 Tablet(s) Oral at bedtime  SULFASALAZINE 50 DELAYED RELEASE TABLETS 2 Tablet(s) 2 Tablet(s) Oral every 12 hours    MEDICATIONS  (PRN):  acetaminophen     Tablet .. 650 milliGRAM(s) Oral every 6 hours PRN Temp greater or equal to 38C (100.4F), Mild Pain (1 - 3)  aluminum hydroxide/magnesium hydroxide/simethicone Suspension 30 milliLiter(s) Oral every 6 hours PRN Dyspepsia  dextrose Oral Gel 15 Gram(s) Oral once PRN Blood Glucose LESS THAN 70 milliGRAM(s)/deciliter  melatonin 3 milliGRAM(s) Oral at bedtime PRN Insomnia      Weight:  Daily     Daily     Weight Change:     Nutrition Focused Physical Exam: Completed [   ]  Not Pertinent [   ]  Muscle Wasting- Temporal [   ]  Clavicle/Pectoral [   ]  Shoulder/Deltoid [   ]  Scapula [   ]  Interosseous [   ]  Quadriceps [   ]  Gastrocnemius [   ]  Fat Wasting- Orbital [   ]  Buccal [   ]  Triceps [   ]  Rib [   ]  Suspect [PCM] 2/2 to physical assessment, [poor intake], and [wt loss]; please see malnutrition chart note.    Estimated energy needs:     Subjective:     Previous Nutrition Diagnosis:    Active [   ]  Resolved [   ]    If resolved, new PES:     Goal:    Recommendations:    Education:     Risk Level: High [   ] Moderate [   ] Low [   ] Admitting Diagnosis:   Patient is a 73y old  Female who presents with a chief complaint of RA flare (16 Oct 2023 08:00)      PAST MEDICAL & SURGICAL HISTORY:  HTN (hypertension)  HLD (hyperlipidemia)  DM (diabetes mellitus), type 2  Rheumatoid arthritis  Stage 4 chronic kidney disease  Degenerative joint disease (DJD) of lumbar spine  Osteopenia  S/P total right hip arthroplasty      Current Nutrition Order: consistent carbohydrate, Glucerna 1x/day       PO Intake: Good (%) [ x  ]  Fair (50-75%) [   ] Poor (<25%) [   ]    GI Issues: last BM 10/15 per EMR    Skin Integrity: no pressure injuries/edema documented    Labs:   10-14    131<L>  |  102  |  22  ----------------------------<  223<H>  4.7   |  17<L>  |  0.89    Ca    9.0      14 Oct 2023 11:11  Phos  2.4     10-14  Mg     1.7     10-14    TPro  6.6  /  Alb  2.8<L>  /  TBili  0.3  /  DBili  x   /  AST  27  /  ALT  19  /  AlkPhos  96  10-14    CAPILLARY BLOOD GLUCOSE      POCT Blood Glucose.: 219 mg/dL (16 Oct 2023 09:31)  POCT Blood Glucose.: 264 mg/dL (15 Oct 2023 22:10)  POCT Blood Glucose.: 178 mg/dL (15 Oct 2023 17:28)  POCT Blood Glucose.: 231 mg/dL (15 Oct 2023 14:02)  POCT Blood Glucose.: 202 mg/dL (15 Oct 2023 10:04)      Medications:  MEDICATIONS  (STANDING):  amLODIPine   Tablet 10 milliGRAM(s) Oral every 24 hours  aspirin  chewable 81 milliGRAM(s) Oral daily  atorvastatin 40 milliGRAM(s) Oral at bedtime  cyanocobalamin 1000 MICROGram(s) Oral daily  dextrose 5%. 1000 milliLiter(s) (50 mL/Hr) IV Continuous <Continuous>  dextrose 5%. 1000 milliLiter(s) (100 mL/Hr) IV Continuous <Continuous>  dextrose 50% Injectable 25 Gram(s) IV Push once  dextrose 50% Injectable 25 Gram(s) IV Push once  dextrose 50% Injectable 12.5 Gram(s) IV Push once  dextrose 50% Injectable 25 Gram(s) IV Push once  enoxaparin Injectable 40 milliGRAM(s) SubCutaneous every 24 hours  folic acid 1 milliGRAM(s) Oral daily  glucagon  Injectable 1 milliGRAM(s) IntraMuscular once  hydroxychloroquine 200 milliGRAM(s) Oral two times a day  influenza  Vaccine (HIGH DOSE) 0.7 milliLiter(s) IntraMuscular once  insulin glargine Injectable (LANTUS) 20 Unit(s) SubCutaneous at bedtime  insulin lispro (ADMELOG) corrective regimen sliding scale   SubCutaneous Before meals and at bedtime  insulin lispro Injectable (ADMELOG) 11 Unit(s) SubCutaneous three times a day before meals  lidocaine   4% Patch 1 Patch Transdermal every 24 hours  losartan 100 milliGRAM(s) Oral every 24 hours  metoprolol succinate  milliGRAM(s) Oral daily  polyethylene glycol 3350 17 Gram(s) Oral daily  predniSONE   Tablet 5 milliGRAM(s) Oral every 12 hours  senna 2 Tablet(s) Oral at bedtime  SULFASALAZINE 50 DELAYED RELEASE TABLETS 2 Tablet(s) 2 Tablet(s) Oral every 12 hours    MEDICATIONS  (PRN):  acetaminophen     Tablet .. 650 milliGRAM(s) Oral every 6 hours PRN Temp greater or equal to 38C (100.4F), Mild Pain (1 - 3)  aluminum hydroxide/magnesium hydroxide/simethicone Suspension 30 milliLiter(s) Oral every 6 hours PRN Dyspepsia  dextrose Oral Gel 15 Gram(s) Oral once PRN Blood Glucose LESS THAN 70 milliGRAM(s)/deciliter  melatonin 3 milliGRAM(s) Oral at bedtime PRN Insomnia      Weight:  Height for BMI (FEET)	5 Feet  Height for BMI (INCHES)	3 Inch(s)  Height for BMI (CENTIMETERS)	160.02 Centimeter(s)  Weight for BMI (lbs)	140 lb  Weight for BMI (kg)	63.5 kg  Body Mass Index	24.7    Weight Change: no new weights to assess    Estimated energy needs:   Estimated Energy Needs Weight (lbs)	115 lb  Estimated Energy Needs Weight (kg)	52.1 kg  Estimated Energy Needs From (shonda/kg)	30  Estimated Energy Needs To (shonda/kg)	35  Estimated Energy Needs Calculated From (shonda/kg)	1563  Estimated Energy Needs Calculated To (shonda/kg)	1823    Estimated Protein Needs Weight (lbs)	115 lb  Estimated Protein Needs Weight (kg)	52.1 kg  Estimated Protein Needs From (g/kg)	1.2  Estimated Protein Needs To (g/kg)	2  Estimated Protein Needs Calculated From (g/kg)	62.52  Estimated Protein Needs Calculated To (g/kg)	104.2  Other Calculations	Based on Standards of Care patient above % IBW (122%) thus ideal body weight used for all calculations (115#). Needs adjusted for advanced age, ca dx, malnutrition. Fluid recs per team.    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    Patient seen at bedside for follow up assessment. Current diet order: consistent carbohydrate, Glucerna 1x/day. Patient reports good appetite, eating well. Reports she does not need the Glucerna anymore, recommend to d/c. Last BM 10/15 per EMR. Elevated blood sugar, hyponatremia, hypophosphatemia. Meds: insulin, prednisone, bowel regimen, vitamin B12, folic acid. RD to f/u prn.    Previous Nutrition Diagnosis: Severe protein calorie malnutrition in context of chronic illness related to patient's condition as evidenced by 39% weight loss x1 year, likely meeting <75% of nutrition needs >1 month.    Active [  x ]  Resolved [   ]    Goal: Patient to meet at least 75% of nutritional needs consistently     Recommendations:  1. Continue with consistent carbohydrate diet. Recommend to remove Glucerna from diet order.   2. Encourage pt to meet nutritional needs as able   3. Monitor labs: electrolytes, cmp, fingersticks  4. Monitor weights   5. Pain and bowel regimen per team   6. Will continue to assess/honor food preferences as able     Risk Level: High [   ] Moderate [ x  ] Low [   ]

## 2023-10-17 ENCOUNTER — NON-APPOINTMENT (OUTPATIENT)
Age: 73
End: 2023-10-17

## 2023-10-17 DIAGNOSIS — E87.1 HYPO-OSMOLALITY AND HYPONATREMIA: ICD-10-CM

## 2023-10-17 LAB
ALBUMIN SERPL ELPH-MCNC: 3.2 G/DL — LOW (ref 3.3–5)
ALBUMIN SERPL ELPH-MCNC: 3.2 G/DL — LOW (ref 3.3–5)
ALP SERPL-CCNC: 109 U/L — SIGNIFICANT CHANGE UP (ref 40–120)
ALP SERPL-CCNC: 109 U/L — SIGNIFICANT CHANGE UP (ref 40–120)
ALT FLD-CCNC: 21 U/L — SIGNIFICANT CHANGE UP (ref 10–45)
ALT FLD-CCNC: 21 U/L — SIGNIFICANT CHANGE UP (ref 10–45)
ANION GAP SERPL CALC-SCNC: 7 MMOL/L — SIGNIFICANT CHANGE UP (ref 5–17)
ANION GAP SERPL CALC-SCNC: 7 MMOL/L — SIGNIFICANT CHANGE UP (ref 5–17)
AST SERPL-CCNC: 23 U/L — SIGNIFICANT CHANGE UP (ref 10–40)
AST SERPL-CCNC: 23 U/L — SIGNIFICANT CHANGE UP (ref 10–40)
BASOPHILS # BLD AUTO: 0.12 K/UL — SIGNIFICANT CHANGE UP (ref 0–0.2)
BASOPHILS # BLD AUTO: 0.12 K/UL — SIGNIFICANT CHANGE UP (ref 0–0.2)
BASOPHILS NFR BLD AUTO: 0.9 % — SIGNIFICANT CHANGE UP (ref 0–2)
BASOPHILS NFR BLD AUTO: 0.9 % — SIGNIFICANT CHANGE UP (ref 0–2)
BILIRUB SERPL-MCNC: 0.2 MG/DL — SIGNIFICANT CHANGE UP (ref 0.2–1.2)
BILIRUB SERPL-MCNC: 0.2 MG/DL — SIGNIFICANT CHANGE UP (ref 0.2–1.2)
BUN SERPL-MCNC: 28 MG/DL — HIGH (ref 7–23)
BUN SERPL-MCNC: 28 MG/DL — HIGH (ref 7–23)
CALCIUM SERPL-MCNC: 9.4 MG/DL — SIGNIFICANT CHANGE UP (ref 8.4–10.5)
CALCIUM SERPL-MCNC: 9.4 MG/DL — SIGNIFICANT CHANGE UP (ref 8.4–10.5)
CHLORIDE SERPL-SCNC: 100 MMOL/L — SIGNIFICANT CHANGE UP (ref 96–108)
CHLORIDE SERPL-SCNC: 100 MMOL/L — SIGNIFICANT CHANGE UP (ref 96–108)
CO2 SERPL-SCNC: 23 MMOL/L — SIGNIFICANT CHANGE UP (ref 22–31)
CO2 SERPL-SCNC: 23 MMOL/L — SIGNIFICANT CHANGE UP (ref 22–31)
CREAT SERPL-MCNC: 0.98 MG/DL — SIGNIFICANT CHANGE UP (ref 0.5–1.3)
CREAT SERPL-MCNC: 0.98 MG/DL — SIGNIFICANT CHANGE UP (ref 0.5–1.3)
EGFR: 61 ML/MIN/1.73M2 — SIGNIFICANT CHANGE UP
EGFR: 61 ML/MIN/1.73M2 — SIGNIFICANT CHANGE UP
EOSINOPHIL # BLD AUTO: 0.25 K/UL — SIGNIFICANT CHANGE UP (ref 0–0.5)
EOSINOPHIL # BLD AUTO: 0.25 K/UL — SIGNIFICANT CHANGE UP (ref 0–0.5)
EOSINOPHIL NFR BLD AUTO: 1.9 % — SIGNIFICANT CHANGE UP (ref 0–6)
EOSINOPHIL NFR BLD AUTO: 1.9 % — SIGNIFICANT CHANGE UP (ref 0–6)
GLUCOSE BLDC GLUCOMTR-MCNC: 105 MG/DL — HIGH (ref 70–99)
GLUCOSE BLDC GLUCOMTR-MCNC: 105 MG/DL — HIGH (ref 70–99)
GLUCOSE BLDC GLUCOMTR-MCNC: 114 MG/DL — HIGH (ref 70–99)
GLUCOSE BLDC GLUCOMTR-MCNC: 114 MG/DL — HIGH (ref 70–99)
GLUCOSE BLDC GLUCOMTR-MCNC: 141 MG/DL — HIGH (ref 70–99)
GLUCOSE BLDC GLUCOMTR-MCNC: 141 MG/DL — HIGH (ref 70–99)
GLUCOSE BLDC GLUCOMTR-MCNC: 165 MG/DL — HIGH (ref 70–99)
GLUCOSE BLDC GLUCOMTR-MCNC: 165 MG/DL — HIGH (ref 70–99)
GLUCOSE BLDC GLUCOMTR-MCNC: 189 MG/DL — HIGH (ref 70–99)
GLUCOSE BLDC GLUCOMTR-MCNC: 189 MG/DL — HIGH (ref 70–99)
GLUCOSE SERPL-MCNC: 161 MG/DL — HIGH (ref 70–99)
GLUCOSE SERPL-MCNC: 161 MG/DL — HIGH (ref 70–99)
HCT VFR BLD CALC: 35.1 % — SIGNIFICANT CHANGE UP (ref 34.5–45)
HCT VFR BLD CALC: 35.1 % — SIGNIFICANT CHANGE UP (ref 34.5–45)
HGB BLD-MCNC: 10.9 G/DL — LOW (ref 11.5–15.5)
HGB BLD-MCNC: 10.9 G/DL — LOW (ref 11.5–15.5)
IMM GRANULOCYTES NFR BLD AUTO: 2.8 % — HIGH (ref 0–0.9)
IMM GRANULOCYTES NFR BLD AUTO: 2.8 % — HIGH (ref 0–0.9)
LYMPHOCYTES # BLD AUTO: 17.2 % — SIGNIFICANT CHANGE UP (ref 13–44)
LYMPHOCYTES # BLD AUTO: 17.2 % — SIGNIFICANT CHANGE UP (ref 13–44)
LYMPHOCYTES # BLD AUTO: 2.31 K/UL — SIGNIFICANT CHANGE UP (ref 1–3.3)
LYMPHOCYTES # BLD AUTO: 2.31 K/UL — SIGNIFICANT CHANGE UP (ref 1–3.3)
MAGNESIUM SERPL-MCNC: 1.5 MG/DL — LOW (ref 1.6–2.6)
MAGNESIUM SERPL-MCNC: 1.5 MG/DL — LOW (ref 1.6–2.6)
MCHC RBC-ENTMCNC: 29.1 PG — SIGNIFICANT CHANGE UP (ref 27–34)
MCHC RBC-ENTMCNC: 29.1 PG — SIGNIFICANT CHANGE UP (ref 27–34)
MCHC RBC-ENTMCNC: 31.1 GM/DL — LOW (ref 32–36)
MCHC RBC-ENTMCNC: 31.1 GM/DL — LOW (ref 32–36)
MCV RBC AUTO: 93.9 FL — SIGNIFICANT CHANGE UP (ref 80–100)
MCV RBC AUTO: 93.9 FL — SIGNIFICANT CHANGE UP (ref 80–100)
MONOCYTES # BLD AUTO: 1.04 K/UL — HIGH (ref 0–0.9)
MONOCYTES # BLD AUTO: 1.04 K/UL — HIGH (ref 0–0.9)
MONOCYTES NFR BLD AUTO: 7.7 % — SIGNIFICANT CHANGE UP (ref 2–14)
MONOCYTES NFR BLD AUTO: 7.7 % — SIGNIFICANT CHANGE UP (ref 2–14)
NEUTROPHILS # BLD AUTO: 9.33 K/UL — HIGH (ref 1.8–7.4)
NEUTROPHILS # BLD AUTO: 9.33 K/UL — HIGH (ref 1.8–7.4)
NEUTROPHILS NFR BLD AUTO: 69.5 % — SIGNIFICANT CHANGE UP (ref 43–77)
NEUTROPHILS NFR BLD AUTO: 69.5 % — SIGNIFICANT CHANGE UP (ref 43–77)
NRBC # BLD: 0 /100 WBCS — SIGNIFICANT CHANGE UP (ref 0–0)
NRBC # BLD: 0 /100 WBCS — SIGNIFICANT CHANGE UP (ref 0–0)
OSMOLALITY UR: 457 MOSM/KG — SIGNIFICANT CHANGE UP (ref 300–900)
OSMOLALITY UR: 457 MOSM/KG — SIGNIFICANT CHANGE UP (ref 300–900)
OSMOLALITY UR: 527 MOSM/KG — SIGNIFICANT CHANGE UP (ref 300–900)
OSMOLALITY UR: 527 MOSM/KG — SIGNIFICANT CHANGE UP (ref 300–900)
PHOSPHATE SERPL-MCNC: 3.5 MG/DL — SIGNIFICANT CHANGE UP (ref 2.5–4.5)
PHOSPHATE SERPL-MCNC: 3.5 MG/DL — SIGNIFICANT CHANGE UP (ref 2.5–4.5)
PLATELET # BLD AUTO: 251 K/UL — SIGNIFICANT CHANGE UP (ref 150–400)
PLATELET # BLD AUTO: 251 K/UL — SIGNIFICANT CHANGE UP (ref 150–400)
POTASSIUM SERPL-MCNC: 4 MMOL/L — SIGNIFICANT CHANGE UP (ref 3.5–5.3)
POTASSIUM SERPL-MCNC: 4 MMOL/L — SIGNIFICANT CHANGE UP (ref 3.5–5.3)
POTASSIUM SERPL-SCNC: 4 MMOL/L — SIGNIFICANT CHANGE UP (ref 3.5–5.3)
POTASSIUM SERPL-SCNC: 4 MMOL/L — SIGNIFICANT CHANGE UP (ref 3.5–5.3)
PROT SERPL-MCNC: 6.1 G/DL — SIGNIFICANT CHANGE UP (ref 6–8.3)
PROT SERPL-MCNC: 6.1 G/DL — SIGNIFICANT CHANGE UP (ref 6–8.3)
RBC # BLD: 3.74 M/UL — LOW (ref 3.8–5.2)
RBC # BLD: 3.74 M/UL — LOW (ref 3.8–5.2)
RBC # FLD: 15.8 % — HIGH (ref 10.3–14.5)
RBC # FLD: 15.8 % — HIGH (ref 10.3–14.5)
SODIUM SERPL-SCNC: 130 MMOL/L — LOW (ref 135–145)
SODIUM SERPL-SCNC: 130 MMOL/L — LOW (ref 135–145)
SODIUM UR-SCNC: 54 MMOL/L — SIGNIFICANT CHANGE UP
SODIUM UR-SCNC: 54 MMOL/L — SIGNIFICANT CHANGE UP
SODIUM UR-SCNC: 55 MMOL/L — SIGNIFICANT CHANGE UP
SODIUM UR-SCNC: 55 MMOL/L — SIGNIFICANT CHANGE UP
WBC # BLD: 13.42 K/UL — HIGH (ref 3.8–10.5)
WBC # BLD: 13.42 K/UL — HIGH (ref 3.8–10.5)
WBC # FLD AUTO: 13.42 K/UL — HIGH (ref 3.8–10.5)
WBC # FLD AUTO: 13.42 K/UL — HIGH (ref 3.8–10.5)

## 2023-10-17 PROCEDURE — 99232 SBSQ HOSP IP/OBS MODERATE 35: CPT | Mod: GC

## 2023-10-17 PROCEDURE — 99222 1ST HOSP IP/OBS MODERATE 55: CPT | Mod: GC

## 2023-10-17 RX ORDER — INSULIN GLARGINE 100 [IU]/ML
15 INJECTION, SOLUTION SUBCUTANEOUS
Qty: 1 | Refills: 0
Start: 2023-10-17 | End: 2023-11-15

## 2023-10-17 RX ORDER — INSULIN GLARGINE 100 [IU]/ML
18 INJECTION, SOLUTION SUBCUTANEOUS AT BEDTIME
Refills: 0 | Status: DISCONTINUED | OUTPATIENT
Start: 2023-10-17 | End: 2023-10-18

## 2023-10-17 RX ORDER — REPAGLINIDE 1 MG/1
1 TABLET ORAL
Qty: 90 | Refills: 0
Start: 2023-10-17 | End: 2023-11-15

## 2023-10-17 RX ORDER — INSULIN LISPRO 100/ML
6 VIAL (ML) SUBCUTANEOUS
Refills: 0 | Status: DISCONTINUED | OUTPATIENT
Start: 2023-10-17 | End: 2023-10-18

## 2023-10-17 RX ORDER — MAGNESIUM SULFATE 500 MG/ML
1 VIAL (ML) INJECTION ONCE
Refills: 0 | Status: COMPLETED | OUTPATIENT
Start: 2023-10-17 | End: 2023-10-17

## 2023-10-17 RX ORDER — SULFASALAZINE 500 MG
2 TABLET ORAL
Qty: 0 | Refills: 0 | DISCHARGE

## 2023-10-17 RX ORDER — SULFASALAZINE 500 MG
2 TABLET ORAL
Qty: 120 | Refills: 0
Start: 2023-10-17 | End: 2023-11-15

## 2023-10-17 RX ADMIN — LIDOCAINE 1 PATCH: 4 CREAM TOPICAL at 19:25

## 2023-10-17 RX ADMIN — Medication 1: at 14:07

## 2023-10-17 RX ADMIN — AMLODIPINE BESYLATE 10 MILLIGRAM(S): 2.5 TABLET ORAL at 22:15

## 2023-10-17 RX ADMIN — Medication 100 GRAM(S): at 10:04

## 2023-10-17 RX ADMIN — Medication 1: at 10:04

## 2023-10-17 RX ADMIN — Medication 81 MILLIGRAM(S): at 11:37

## 2023-10-17 RX ADMIN — Medication 5 MILLIGRAM(S): at 19:20

## 2023-10-17 RX ADMIN — Medication 11 UNIT(S): at 14:07

## 2023-10-17 RX ADMIN — LOSARTAN POTASSIUM 100 MILLIGRAM(S): 100 TABLET, FILM COATED ORAL at 22:14

## 2023-10-17 RX ADMIN — LIDOCAINE 1 PATCH: 4 CREAM TOPICAL at 00:00

## 2023-10-17 RX ADMIN — ENOXAPARIN SODIUM 40 MILLIGRAM(S): 100 INJECTION SUBCUTANEOUS at 22:14

## 2023-10-17 RX ADMIN — Medication 200 MILLIGRAM(S): at 19:16

## 2023-10-17 RX ADMIN — Medication 5 MILLIGRAM(S): at 06:17

## 2023-10-17 RX ADMIN — Medication 11 UNIT(S): at 10:04

## 2023-10-17 RX ADMIN — ATORVASTATIN CALCIUM 40 MILLIGRAM(S): 80 TABLET, FILM COATED ORAL at 22:15

## 2023-10-17 RX ADMIN — Medication 1 MILLIGRAM(S): at 11:37

## 2023-10-17 RX ADMIN — PREGABALIN 1000 MICROGRAM(S): 225 CAPSULE ORAL at 11:37

## 2023-10-17 RX ADMIN — Medication 6 UNIT(S): at 19:16

## 2023-10-17 RX ADMIN — INSULIN GLARGINE 18 UNIT(S): 100 INJECTION, SOLUTION SUBCUTANEOUS at 22:15

## 2023-10-17 RX ADMIN — LIDOCAINE 1 PATCH: 4 CREAM TOPICAL at 11:38

## 2023-10-17 RX ADMIN — Medication 100 MILLIGRAM(S): at 05:44

## 2023-10-17 RX ADMIN — Medication 200 MILLIGRAM(S): at 06:18

## 2023-10-17 NOTE — PROGRESS NOTE ADULT - PROBLEM SELECTOR PLAN 5
home meds: meds, amlodipine 10mg, losartan 100mg, Toprol 100mg    - Continue home meds baseline Cr 2, currently 1.01, voiding     - Trend BUN/Cr  - Avoid nephrotoxic meds

## 2023-10-17 NOTE — PROGRESS NOTE ADULT - SUBJECTIVE AND OBJECTIVE BOX
Internal Medicine Progress Note  Marylin Charlton, PGY-1  Pager: [phone]    ******INCOMPLETE******    Patient is a 73y old  Female who presents with a chief complaint of RA flare (16 Oct 2023 09:47)    OVERNIGHT EVENTS/INTERVAL HPI:    REVIEW OF SYSTEMS:  All other review of systems is negative unless indicated above.    OBJECTIVE:  T(C): 36.5 (10-17-23 @ 05:06), Max: 37.1 (10-16-23 @ 21:09)  HR: 69 (10-17-23 @ 05:06) (60 - 71)  BP: 118/71 (10-17-23 @ 05:06) (103/68 - 149/73)  RR: 18 (10-17-23 @ 05:06) (18 - 18)  SpO2: 98% (10-17-23 @ 05:06) (94% - 98%)  Daily     Daily     Physical Exam:  General: in no acute distress  Eyes: EOMI intact bilaterally. Anicteric sclerae, moist conjunctivae  HENT: Moist mucous membranes  Neck: Trachea midline, supple  Lungs: CTA B/L. No wheezes, rales, or rhonchi  Cardiovascular: RRR. No murmurs, rubs, or gallops  Abdomen: Soft, non-tender non-distended; No rebound or guarding  Extremities: WWP, No clubbing, cyanosis or edema  MSK: No midline bony tenderness. No CVA tenderness bilaterally  Neurological: Alert and oriented x3  Skin: Warm and dry. No obvious rash     Medications:  MEDICATIONS  (STANDING):  amLODIPine   Tablet 10 milliGRAM(s) Oral every 24 hours  aspirin  chewable 81 milliGRAM(s) Oral daily  atorvastatin 40 milliGRAM(s) Oral at bedtime  cyanocobalamin 1000 MICROGram(s) Oral daily  dextrose 5%. 1000 milliLiter(s) (100 mL/Hr) IV Continuous <Continuous>  dextrose 5%. 1000 milliLiter(s) (50 mL/Hr) IV Continuous <Continuous>  dextrose 50% Injectable 25 Gram(s) IV Push once  dextrose 50% Injectable 12.5 Gram(s) IV Push once  dextrose 50% Injectable 25 Gram(s) IV Push once  dextrose 50% Injectable 25 Gram(s) IV Push once  enoxaparin Injectable 40 milliGRAM(s) SubCutaneous every 24 hours  folic acid 1 milliGRAM(s) Oral daily  glucagon  Injectable 1 milliGRAM(s) IntraMuscular once  hydroxychloroquine 200 milliGRAM(s) Oral two times a day  influenza  Vaccine (HIGH DOSE) 0.7 milliLiter(s) IntraMuscular once  insulin glargine Injectable (LANTUS) 20 Unit(s) SubCutaneous at bedtime  insulin lispro (ADMELOG) corrective regimen sliding scale   SubCutaneous Before meals and at bedtime  insulin lispro Injectable (ADMELOG) 11 Unit(s) SubCutaneous three times a day before meals  lidocaine   4% Patch 1 Patch Transdermal every 24 hours  losartan 100 milliGRAM(s) Oral every 24 hours  metoprolol succinate  milliGRAM(s) Oral daily  polyethylene glycol 3350 17 Gram(s) Oral daily  predniSONE   Tablet 5 milliGRAM(s) Oral every 12 hours  senna 2 Tablet(s) Oral at bedtime  SULFASALAZINE 50 DELAYED RELEASE TABLETS 2 Tablet(s) 2 Tablet(s) Oral every 12 hours    MEDICATIONS  (PRN):  acetaminophen     Tablet .. 650 milliGRAM(s) Oral every 6 hours PRN Temp greater or equal to 38C (100.4F), Mild Pain (1 - 3)  aluminum hydroxide/magnesium hydroxide/simethicone Suspension 30 milliLiter(s) Oral every 6 hours PRN Dyspepsia  dextrose Oral Gel 15 Gram(s) Oral once PRN Blood Glucose LESS THAN 70 milliGRAM(s)/deciliter  melatonin 3 milliGRAM(s) Oral at bedtime PRN Insomnia      Labs:              COVID-19 PCR: NotDetec (21 Jul 2023 19:49)  COVID-19 PCR: NotDetec (13 Jul 2023 05:30)      Radiology: Reviewed Patient is a 73y old  Female who presents with a chief complaint of RA flare (16 Oct 2023 09:47)    OVERNIGHT EVENTS/INTERVAL HPI: No acute events overnight. Pt seen and examined at bedside. Denies any pain. Concern about going home and completing ADLs on her own.     REVIEW OF SYSTEMS:  All other review of systems is negative unless indicated above.    OBJECTIVE:  T(C): 36.5 (10-17-23 @ 05:06), Max: 37.1 (10-16-23 @ 21:09)  HR: 69 (10-17-23 @ 05:06) (60 - 71)  BP: 118/71 (10-17-23 @ 05:06) (103/68 - 149/73)  RR: 18 (10-17-23 @ 05:06) (18 - 18)  SpO2: 98% (10-17-23 @ 05:06) (94% - 98%)  Daily     Daily     Physical Exam:  General: in no acute distress  Lungs: CTA B/L. No wheezes, rales, or rhonchi  Cardiovascular: RRR. No murmurs, rubs, or gallops  Abdomen: Soft, non-tender non-distended; No rebound or guarding  Extremities: WWP, No clubbing, cyanosis or edema  MSK: +ulnar deviation   Neurological: Alert and oriented x3  Skin: Warm and dry. No obvious rash     Medications:  MEDICATIONS  (STANDING):  amLODIPine   Tablet 10 milliGRAM(s) Oral every 24 hours  aspirin  chewable 81 milliGRAM(s) Oral daily  atorvastatin 40 milliGRAM(s) Oral at bedtime  cyanocobalamin 1000 MICROGram(s) Oral daily  dextrose 5%. 1000 milliLiter(s) (100 mL/Hr) IV Continuous <Continuous>  dextrose 5%. 1000 milliLiter(s) (50 mL/Hr) IV Continuous <Continuous>  dextrose 50% Injectable 25 Gram(s) IV Push once  dextrose 50% Injectable 12.5 Gram(s) IV Push once  dextrose 50% Injectable 25 Gram(s) IV Push once  dextrose 50% Injectable 25 Gram(s) IV Push once  enoxaparin Injectable 40 milliGRAM(s) SubCutaneous every 24 hours  folic acid 1 milliGRAM(s) Oral daily  glucagon  Injectable 1 milliGRAM(s) IntraMuscular once  hydroxychloroquine 200 milliGRAM(s) Oral two times a day  influenza  Vaccine (HIGH DOSE) 0.7 milliLiter(s) IntraMuscular once  insulin glargine Injectable (LANTUS) 20 Unit(s) SubCutaneous at bedtime  insulin lispro (ADMELOG) corrective regimen sliding scale   SubCutaneous Before meals and at bedtime  insulin lispro Injectable (ADMELOG) 11 Unit(s) SubCutaneous three times a day before meals  lidocaine   4% Patch 1 Patch Transdermal every 24 hours  losartan 100 milliGRAM(s) Oral every 24 hours  metoprolol succinate  milliGRAM(s) Oral daily  polyethylene glycol 3350 17 Gram(s) Oral daily  predniSONE   Tablet 5 milliGRAM(s) Oral every 12 hours  senna 2 Tablet(s) Oral at bedtime  SULFASALAZINE 50 DELAYED RELEASE TABLETS 2 Tablet(s) 2 Tablet(s) Oral every 12 hours    MEDICATIONS  (PRN):  acetaminophen     Tablet .. 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 RA flare controlled w/ prednisone taper, will be discharged on prednisone 5 mg. Blood sugar controlled with lispro and lantus. Endo consulted for final discharge recommendations i/s/o A1c of 10. Pt's friend picked up discharge medications from [facility] on 10/17. Follow up appointments made with Dr. Delaney for oncology, Dr. Douglass for primary care and w/ Dr. Johnston for rheumatology. Pt noted to be hyponatremic on 10/17. Urine lytes pending. Otherwise patient has no complaints      OVERNIGHT EVENTS/INTERVAL HPI: No acute events overnight. Pt seen and examined at bedside. Denies any pain. Concern about going home and completing ADLs on her own.     REVIEW OF SYSTEMS:  All other review of systems is negative unless indicated above.    OBJECTIVE:  T(C): 36.5 (10-17-23 @ 05:06), Max: 37.1 (10-16-23 @ 21:09)  HR: 69 (10-17-23 @ 05:06) (60 - 71)  BP: 118/71 (10-17-23 @ 05:06) (103/68 - 149/73)  RR: 18 (10-17-23 @ 05:06) (18 - 18)  SpO2: 98% (10-17-23 @ 05:06) (94% - 98%)  Daily     Daily     Physical Exam:  General: in no acute distress  Lungs: CTA B/L. No wheezes, rales, or rhonchi  Cardiovascular: RRR. No murmurs, rubs, or gallops  Abdomen: Soft, non-tender non-distended; No rebound or guarding  Extremities: WWP, No clubbing, cyanosis or edema  MSK: +ulnar deviation   Neurological: Alert and oriented x3  Skin: Warm and dry. No obvious rash     Medications:  MEDICATIONS  (STANDING):  amLODIPine   Tablet 10 milliGRAM(s) Oral every 24 hours  aspirin  chewable 81 milliGRAM(s) Oral daily  atorvastatin 40 milliGRAM(s) Oral at bedtime  cyanocobalamin 1000 MICROGram(s) Oral daily  dextrose 5%. 1000 milliLiter(s) (100 mL/Hr) IV Continuous <Continuous>  dextrose 5%. 1000 milliLiter(s) (50 mL/Hr) IV Continuous <Continuous>  dextrose 50% Injectable 25 Gram(s) IV Push once  dextrose 50% Injectable 12.5 Gram(s) IV Push once  dextrose 50% Injectable 25 Gram(s) IV Push once  dextrose 50% Injectable 25 Gram(s) IV Push once  enoxaparin Injectable 40 milliGRAM(s) SubCutaneous every 24 hours  folic acid 1 milliGRAM(s) Oral daily  glucagon  Injectable 1 milliGRAM(s) IntraMuscular once  hydroxychloroquine 200 milliGRAM(s) Oral two times a day  influenza  Vaccine (HIGH DOSE) 0.7 milliLiter(s) IntraMuscular once  insulin glargine Injectable (LANTUS) 20 Unit(s) SubCutaneous at bedtime  insulin lispro (ADMELOG) corrective regimen sliding scale   SubCutaneous Before meals and at bedtime  insulin lispro Injectable (ADMELOG) 11 Unit(s) SubCutaneous three times a day before meals  lidocaine   4% Patch 1 Patch Transdermal every 24 hours  losartan 100 milliGRAM(s) Oral every 24 hours  metoprolol succinate  milliGRAM(s) Oral daily  polyethylene glycol 3350 17 Gram(s) Oral daily  predniSONE   Tablet 5 milliGRAM(s) Oral every 12 hours  senna 2 Tablet(s) Oral at bedtime  SULFASALAZINE 50 DELAYED RELEASE TABLETS 2 Tablet(s) 2 Tablet(s) Oral every 12 hours    MEDICATIONS  (PRN):  acetaminophen     Tablet .. 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 RA flare controlled w/ prednisone taper, will be discharged on prednisone 5 mg. Blood sugar controlled with lispro and lantus. Endo consulted for final discharge recommendations i/s/o A1c of 10. Pt's friend picked up discharge medications from [facility] pharmacy on 10/17. Follow up appointments made with Dr. Delaney for oncology, Dr. Douglass for primary care and w/ Dr. Johnston for rheumatology. Pt noted to be hyponatremic on 10/17. Urine lytes pending. Otherwise patient has no complaints      OVERNIGHT EVENTS/INTERVAL HPI: No acute events overnight. Pt seen and examined at bedside. Denies any pain. Concern about going home and completing ADLs on her own.     REVIEW OF SYSTEMS:  All other review of systems is negative unless indicated above.    OBJECTIVE:  T(C): 36.5 (10-17-23 @ 05:06), Max: 37.1 (10-16-23 @ 21:09)  HR: 69 (10-17-23 @ 05:06) (60 - 71)  BP: 118/71 (10-17-23 @ 05:06) (103/68 - 149/73)  RR: 18 (10-17-23 @ 05:06) (18 - 18)  SpO2: 98% (10-17-23 @ 05:06) (94% - 98%)  Daily     Daily     Physical Exam:  General: in no acute distress  Lungs: CTA B/L. No wheezes, rales, or rhonchi  Cardiovascular: RRR. No murmurs, rubs, or gallops  Abdomen: Soft, non-tender non-distended; No rebound or guarding  Extremities: WWP, No clubbing, cyanosis or edema  MSK: +ulnar deviation   Neurological: Alert and oriented x3  Skin: Warm and dry. No obvious rash     Medications:  MEDICATIONS  (STANDING):  amLODIPine   Tablet 10 milliGRAM(s) Oral every 24 hours  aspirin  chewable 81 milliGRAM(s) Oral daily  atorvastatin 40 milliGRAM(s) Oral at bedtime  cyanocobalamin 1000 MICROGram(s) Oral daily  dextrose 5%. 1000 milliLiter(s) (100 mL/Hr) IV Continuous <Continuous>  dextrose 5%. 1000 milliLiter(s) (50 mL/Hr) IV Continuous <Continuous>  dextrose 50% Injectable 25 Gram(s) IV Push once  dextrose 50% Injectable 12.5 Gram(s) IV Push once  dextrose 50% Injectable 25 Gram(s) IV Push once  dextrose 50% Injectable 25 Gram(s) IV Push once  enoxaparin Injectable 40 milliGRAM(s) SubCutaneous every 24 hours  folic acid 1 milliGRAM(s) Oral daily  glucagon  Injectable 1 milliGRAM(s) IntraMuscular once  hydroxychloroquine 200 milliGRAM(s) Oral two times a day  influenza  Vaccine (HIGH DOSE) 0.7 milliLiter(s) IntraMuscular once  insulin glargine Injectable (LANTUS) 20 Unit(s) SubCutaneous at bedtime  insulin lispro (ADMELOG) corrective regimen sliding scale   SubCutaneous Before meals and at bedtime  insulin lispro Injectable (ADMELOG) 11 Unit(s) SubCutaneous three times a day before meals  lidocaine   4% Patch 1 Patch Transdermal every 24 hours  losartan 100 milliGRAM(s) Oral every 24 hours  metoprolol succinate  milliGRAM(s) Oral daily  polyethylene glycol 3350 17 Gram(s) Oral daily  predniSONE   Tablet 5 milliGRAM(s) Oral every 12 hours  senna 2 Tablet(s) Oral at bedtime  SULFASALAZINE 50 DELAYED RELEASE TABLETS 2 Tablet(s) 2 Tablet(s) Oral every 12 hours    MEDICATIONS  (PRN):  acetaminophen     Tablet .. 650 milliGRAM(s) Oral every 6 hours PRN Temp greater or equal to 38C (100.4F), Mild Pain (1 - 3)  aluminum hydroxide/magnesium hydroxide/simethicone Suspension 30 milliLiter(s) Oral every 6 hours PRN Dyspepsia  dextrose Oral Gel 15 Gram(s) Oral once PRN Blood Glucose LESS THAN 70 milliGRAM(s)/deciliter  melatonin 3 milliGRAM(s) Oral at bedtime PRN Insomnia      Labs:              COVID-19 PCR: NotDetec (21 Jul 2023 19:49)  COVID-19 PCR: NotDetec (13 Jul 2023 05:30)      Radiology: Reviewed

## 2023-10-17 NOTE — PROGRESS NOTE ADULT - SUBJECTIVE AND OBJECTIVE BOX
INTERVAL HPI/OVERNIGHT EVENTS:  In good spirits;  Ready to leave ; Will go home on present  regimen including the Prednisone 5 mg a day   Will taper as outpatient       MEDICATIONS  (STANDING):  amLODIPine   Tablet 10 milliGRAM(s) Oral every 24 hours  aspirin  chewable 81 milliGRAM(s) Oral daily  atorvastatin 40 milliGRAM(s) Oral at bedtime  cyanocobalamin 1000 MICROGram(s) Oral daily  dextrose 5%. 1000 milliLiter(s) (50 mL/Hr) IV Continuous <Continuous>  dextrose 5%. 1000 milliLiter(s) (100 mL/Hr) IV Continuous <Continuous>  dextrose 50% Injectable 25 Gram(s) IV Push once  dextrose 50% Injectable 25 Gram(s) IV Push once  dextrose 50% Injectable 12.5 Gram(s) IV Push once  dextrose 50% Injectable 25 Gram(s) IV Push once  enoxaparin Injectable 40 milliGRAM(s) SubCutaneous every 24 hours  folic acid 1 milliGRAM(s) Oral daily  glucagon  Injectable 1 milliGRAM(s) IntraMuscular once  hydroxychloroquine 200 milliGRAM(s) Oral two times a day  influenza  Vaccine (HIGH DOSE) 0.7 milliLiter(s) IntraMuscular once  insulin glargine Injectable (LANTUS) 20 Unit(s) SubCutaneous at bedtime  insulin lispro (ADMELOG) corrective regimen sliding scale   SubCutaneous Before meals and at bedtime  insulin lispro Injectable (ADMELOG) 11 Unit(s) SubCutaneous three times a day before meals  lidocaine   4% Patch 1 Patch Transdermal every 24 hours  losartan 100 milliGRAM(s) Oral every 24 hours  metoprolol succinate  milliGRAM(s) Oral daily  polyethylene glycol 3350 17 Gram(s) Oral daily  predniSONE   Tablet 5 milliGRAM(s) Oral every 12 hours  senna 2 Tablet(s) Oral at bedtime  SULFASALAZINE 50 DELAYED RELEASE TABLETS 2 Tablet(s) 2 Tablet(s) Oral every 12 hours    MEDICATIONS  (PRN):  acetaminophen     Tablet .. 650 milliGRAM(s) Oral every 6 hours PRN Temp greater or equal to 38C (100.4F), Mild Pain (1 - 3)  aluminum hydroxide/magnesium hydroxide/simethicone Suspension 30 milliLiter(s) Oral every 6 hours PRN Dyspepsia  dextrose Oral Gel 15 Gram(s) Oral once PRN Blood Glucose LESS THAN 70 milliGRAM(s)/deciliter  melatonin 3 milliGRAM(s) Oral at bedtime PRN Insomnia      Allergies    Bactrim (Other)    Intolerances        Vital Signs Last 24 Hrs  T(C): 36.6 (17 Oct 2023 09:31), Max: 37.1 (16 Oct 2023 21:09)  T(F): 97.9 (17 Oct 2023 09:31), Max: 98.8 (16 Oct 2023 21:09)  HR: 70 (17 Oct 2023 09:31) (68 - 71)  BP: 115/75 (17 Oct 2023 09:31) (103/68 - 137/73)  BP(mean): 86 (17 Oct 2023 05:06) (86 - 94)  RR: 15 (17 Oct 2023 09:31) (15 - 18)  SpO2: 99% (17 Oct 2023 09:31) (94% - 99%)    Parameters below as of 17 Oct 2023 09:31  Patient On (Oxygen Delivery Method): room air              Constitutional:  Awake     Eyes: NEDRA    ENMT: Negative    Neck: Supple    Back:  no tenderness     Respiratory:  clear    Cardiovascular: S1 S2    Gastrointestinal: soft     Genitourinary:     Ext  no edema   Vascular:    Neurological:    Skin:    Lymph Nodes:            10-16 @ 07:01  -  10-17 @ 07:00  --------------------------------------------------------  IN: 0 mL / OUT: 250 mL / NET: -250 mL      LABS:                        10.9   13.42 )-----------( 251      ( 17 Oct 2023 05:30 )             35.1     10-17    130<L>  |  100  |  28<H>  ----------------------------<  161<H>  4.0   |  23  |  0.98    Ca    9.4      17 Oct 2023 05:30  Phos  3.5     10-17  Mg     1.5     10-17    TPro  6.1  /  Alb  3.2<L>  /  TBili  0.2  /  DBili  x   /  AST  23  /  ALT  21  /  AlkPhos  109  10-17      Urinalysis Basic - ( 17 Oct 2023 05:30 )    Color: x / Appearance: x / SG: x / pH: x  Gluc: 161 mg/dL / Ketone: x  / Bili: x / Urobili: x   Blood: x / Protein: x / Nitrite: x   Leuk Esterase: x / RBC: x / WBC x   Sq Epi: x / Non Sq Epi: x / Bacteria: x        RADIOLOGY & ADDITIONAL TESTS:

## 2023-10-17 NOTE — CONSULT NOTE ADULT - SUBJECTIVE AND OBJECTIVE BOX
HISTORY OF PRESENT ILLNESS  MICHAEL REDA is a 73y Female with a past medical history of     Endocrinology has been consulted for Diabetes Management.    DIABETES HISTORY  - Age at diagnosis: 14 year PTA  - Symptoms at time of diagnosis: Sugars to 500, passed out, went to hospital   - Current Therapy: Farxiga, Januvia  - History of other regimens: SS Novalog  - History of hypoglycemia: Denies  - History of DKA/HHS:  Denies  - Complications:   - Home FSG:        > Fastin,80 mg/dL.        > Before meals: <200 mg/dL.        > Bedtime: *** mg/dL.  - Diet:          > Breakfast: Toast 2 slices, tumeric tea        > Lunch: Crackers and babybel cheese        > Dinner: Potato OR rice, chicken, salad        > Snacks: yogurt at 9PM sometimes  - Physical activity:  [employer] videos  - Diabetes managed outpatient by:    FAMILY HISTORY  - Diabetes:  - Thyroid:  - Autoimmune:  - Other:    SOCIAL HISTORY  - Work:  - Alcohol:  - Smoking:  - Recreational Drugs:    ALLERGIES  Bactrim (Other)      CURRENT MEDICATIONS  acetaminophen     Tablet .. 650 milliGRAM(s) Oral every 6 hours PRN  aluminum hydroxide/magnesium hydroxide/simethicone Suspension 30 milliLiter(s) Oral every 6 hours PRN  amLODIPine   Tablet 10 milliGRAM(s) Oral every 24 hours  aspirin  chewable 81 milliGRAM(s) Oral daily  atorvastatin 40 milliGRAM(s) Oral at bedtime  cyanocobalamin 1000 MICROGram(s) Oral daily  dextrose 5%. 1000 milliLiter(s) IV Continuous <Continuous>  dextrose 5%. 1000 milliLiter(s) IV Continuous <Continuous>  dextrose 50% Injectable 25 Gram(s) IV Push once  dextrose 50% Injectable 25 Gram(s) IV Push once  dextrose 50% Injectable 12.5 Gram(s) IV Push once  dextrose 50% Injectable 25 Gram(s) IV Push once  dextrose Oral Gel 15 Gram(s) Oral once PRN  enoxaparin Injectable 40 milliGRAM(s) SubCutaneous every 24 hours  folic acid 1 milliGRAM(s) Oral daily  glucagon  Injectable 1 milliGRAM(s) IntraMuscular once  hydroxychloroquine 200 milliGRAM(s) Oral two times a day  influenza  Vaccine (HIGH DOSE) 0.7 milliLiter(s) IntraMuscular once  insulin glargine Injectable (LANTUS) 20 Unit(s) SubCutaneous at bedtime  insulin lispro (ADMELOG) corrective regimen sliding scale   SubCutaneous Before meals and at bedtime  insulin lispro Injectable (ADMELOG) 11 Unit(s) SubCutaneous three times a day before meals  lidocaine   4% Patch 1 Patch Transdermal every 24 hours  losartan 100 milliGRAM(s) Oral every 24 hours  melatonin 3 milliGRAM(s) Oral at bedtime PRN  metoprolol succinate  milliGRAM(s) Oral daily  polyethylene glycol 3350 17 Gram(s) Oral daily  predniSONE   Tablet 5 milliGRAM(s) Oral every 12 hours  senna 2 Tablet(s) Oral at bedtime  SULFASALAZINE 50 DELAYED RELEASE TABLETS 2 Tablet(s) 2 Tablet(s) Oral every 12 hours      REVIEW OF SYSTEMS  Constitutional:  Negative fever, chills or loss of appetite.  Eyes:  Negative blurry vision or double vision.  Cardiovascular:  Negative for chest pain or palpitations.  Respiratory:  Negative for cough, wheezing, or shortness of breath.   Gastrointestinal:  Negative for nausea, vomiting, diarrhea, constipation, or abdominal pain.  Genitourinary:  Negative frequency, urgency or dysuria.  Neurologic:  No headache, confusion, dizziness, lightheadedness.    PHYSICAL EXAM  Vital Signs Last 24 Hrs  T(C): 36.6 (17 Oct 2023 09:31), Max: 37.1 (16 Oct 2023 21:09)  T(F): 97.9 (17 Oct 2023 09:31), Max: 98.8 (16 Oct 2023 21:09)  HR: 70 (17 Oct 2023 09:31) (68 - 71)  BP: 115/75 (17 Oct 2023 09:31) (103/68 - 137/73)  BP(mean): 86 (17 Oct 2023 05:06) (86 - 94)  RR: 15 (17 Oct 2023 09:31) (15 - 18)  SpO2: 99% (17 Oct 2023 09:31) (94% - 99%)    Parameters below as of 17 Oct 2023 09:31  Patient On (Oxygen Delivery Method): room air    Constitutional: Awake, alert, in no acute distress.   HEENT: Normocephalic, atraumatic, NEDRA, no proptosis or lid retraction.   Neck: supple, no acanthosis, no thyromegaly or palpable thyroid nodules.  Respiratory: Lungs clear to ausculation bilaterally.   Cardiovascular: regular rhythm, normal S1 and S2, no audible murmurs.   GI: soft, non-tender, non-distended, bowel sounds present, no masses appreciated.  Extremities: No lower extremity edema, peripheral pulses present.   Skin: no rashes.   Psychiatric: AAO x 3. Normal affect/mood.     LABS  CBC - WBC/HGB/HTC/PLT: 13.42/10.9/35.1/251 (10-17-23)  BMP: Na/K/Cl/Bicarb/BUN/Cr/Gluc: 130/4.0/100/23/28/0.98/161 (10-17-23)  Anion Gap: 7 (10-17-23)  eGFR: 61 (10-17-23)  Calcium: 9.4 (10-17-23)  Phosphorus: 3.5 (10-17-23)  Magnesium: 1.5 (10-17-23)  LFT - Alb/Tprot/Tbili/Dbili/AlkPhos/ALT/AST: 3.2/--/0.2/--/109/21/23 (10-17-23)    Thyroid Stimulating Hormone, Serum: 0.824 (07-10-23)    CBC - WBC/HGB/HTC/PLT: 13.42/10.9/35.1/251 (10-17-23)BMP: Na/K/Cl/Bicarb/BUN/Cr/Gluc: 130/4.0/100/23/28/0.98/161 (10-17-23)  Anion Gap: 7 (10-17-23)  eGFR: 61 (10-17-23)  Calcium: 9.4 (10-17-23)  Phosphorus: 3.5 (10-17-23)  Magnesium: 1.5 (10-17-23)    CAPILLARY BLOOD GLUCOSE & INSULIN RECEIVED  219 mg/dL (10-16 @ 09:31)  176 mg/dL (10-16 @ 13:35)  125 mg/dL (10-16 @ 17:51)  118 mg/dL (10-16 @ 22:23)  165 mg/dL (10-17 @ 09:50)        10-16-23 @ 07:01  -  10-17-23 @ 07:00  --------------------------------------------------------  IN: 0 mL / OUT: 250 mL / NET: -250 mL        ASSESSMENT / RECOMMENDATIONS    A1C: 10.0 %  BUN: 28  Creatinine: 0.98  GFR: 61  Weight: 63.5  BMI: 24.8  EF:     # Type 2 diabetes mellitus with hyperglycemia  - Please keep lantus   units at bedtime.   - Keep lispro   units before each meal.  - Continue lispro moderate dose sliding scale before meals and at bedtime.  - Patient's fingerstick glucose goal is 100-180 mg/dL.    - Discharge recommendations to be discussed.   - Patient can follow up at discharge with [facility] by calling [phone] to make an appointment.      Case discussed with Dr. Blackwood. Primary team updated.       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 Pt has history of similar pain caused by RA. Reports taking sulfasalazine and Plaquenil, Tylenol for pain. No NSAID use d/t CKD. She was recently admitted to [facility] for the pain and her sulfasalazine dose was increased to 1000 mg BID. She has been having an RA flare for the past 2 weeks, similar to prior flares but more severe, unable to get out of bed this morning, and called Dr. Douglass who advised her to present to the ED. She reports starting chemotherapy at [facility] [place] on  (unsure of agent) and was planned for 2nd session 3 weeks later, but fell after getting up from bed, prompting admission to [facility]. Denies SOB, cough, fever, chills, headache, vision changes, confusion.     Endocrinology has been consulted for Diabetes Management.    DIABETES HISTORY  - Age at diagnosis: 14 year PTA  - Symptoms at time of diagnosis: Sugars to 500, passed out, went to hospital   - Current Therapy: Farxiga, Januvia  - History of other regimens: SS Novalog  - History of hypoglycemia: Denies  - History of DKA/HHS:  Denies  - Complications:   - Home FSG:        > Fastin,80 mg/dL.        > Before meals: <200 mg/dL.        > Bedtime: *** mg/dL.  - Diet:          > Breakfast: Toast 2 slices, tumeric tea        > Lunch: Crackers and babybel cheese        > Dinner: Potato OR rice, chicken, salad        > Snacks: yogurt at 9PM sometimes  - Diabetes managed outpatient by: Dr. Douglass PCP    FAMILY HISTORY  - Diabetes: Maternal side  - Thyroid: --  - Autoimmune: ---  - Other: ---    SOCIAL HISTORY  - Work: Retired, nurse  - Alcohol: Denies  - Smokin pack years  - Recreational Drugs: a loong time ago    ALLERGIES  Bactrim (Other)      CURRENT MEDICATIONS  acetaminophen     Tablet .. 650 milliGRAM(s) Oral every 6 hours PRN  aluminum hydroxide/magnesium hydroxide/simethicone Suspension 30 milliLiter(s) Oral every 6 hours PRN  amLODIPine   Tablet 10 milliGRAM(s) Oral every 24 hours  aspirin  chewable 81 milliGRAM(s) Oral daily  atorvastatin 40 milliGRAM(s) Oral at bedtime  cyanocobalamin 1000 MICROGram(s) Oral daily  dextrose 5%. 1000 milliLiter(s) IV Continuous <Continuous>  dextrose 5%. 1000 milliLiter(s) IV Continuous <Continuous>  dextrose 50% Injectable 25 Gram(s) IV Push once  dextrose 50% Injectable 25 Gram(s) IV Push once  dextrose 50% Injectable 12.5 Gram(s) IV Push once  dextrose 50% Injectable 25 Gram(s) IV Push once  dextrose Oral Gel 15 Gram(s) Oral once PRN  enoxaparin Injectable 40 milliGRAM(s) SubCutaneous every 24 hours  folic acid 1 milliGRAM(s) Oral daily  glucagon  Injectable 1 milliGRAM(s) IntraMuscular once  hydroxychloroquine 200 milliGRAM(s) Oral two times a day  influenza  Vaccine (HIGH DOSE) 0.7 milliLiter(s) IntraMuscular once  insulin glargine Injectable (LANTUS) 20 Unit(s) SubCutaneous at bedtime  insulin lispro (ADMELOG) corrective regimen sliding scale   SubCutaneous Before meals and at bedtime  insulin lispro Injectable (ADMELOG) 11 Unit(s) SubCutaneous three times a day before meals  lidocaine   4% Patch 1 Patch Transdermal every 24 hours  losartan 100 milliGRAM(s) Oral every 24 hours  melatonin 3 milliGRAM(s) Oral at bedtime PRN  metoprolol succinate  milliGRAM(s) Oral daily  polyethylene glycol 3350 17 Gram(s) Oral daily  predniSONE   Tablet 5 milliGRAM(s) Oral every 12 hours  senna 2 Tablet(s) Oral at bedtime  SULFASALAZINE 50 DELAYED RELEASE TABLETS 2 Tablet(s) 2 Tablet(s) Oral every 12 hours      REVIEW OF SYSTEMS  Constitutional:  Negative fever, chills or loss of appetite.  Eyes:  Negative blurry vision or double vision.  Cardiovascular:  Negative for chest pain or palpitations.  Respiratory:  Negative for cough, wheezing, or shortness of breath.   Gastrointestinal:  Negative for nausea, vomiting, diarrhea, constipation, or abdominal pain.  Genitourinary:  Negative frequency, urgency or dysuria.  Neurologic:  No headache, confusion, dizziness, lightheadedness.    PHYSICAL EXAM  Vital Signs Last 24 Hrs  T(C): 36.6 (17 Oct 2023 09:31), Max: 37.1 (16 Oct 2023 21:09)  T(F): 97.9 (17 Oct 2023 09:31), Max: 98.8 (16 Oct 2023 21:09)  HR: 70 (17 Oct 2023 09:) (68 - 71)  BP: 115/75 (17 Oct 2023 09:) (103/68 - 137/73)  BP(mean): 86 (17 Oct 2023 05:06) (86 - 94)  RR: 15 (17 Oct 2023 09:) (15 - 18)  SpO2: 99% (17 Oct 2023 09:) (94% - 99%)    Parameters below as of 17 Oct 2023 09:31  Patient On (Oxygen Delivery Method): room air    Constitutional: Awake, alert, in no acute distress.   HEENT: Normocephalic, atraumatic, NEDRA, no proptosis or lid retraction.   Neck: supple, no acanthosis, no thyromegaly or palpable thyroid nodules.  Respiratory: Lungs clear to ausculation bilaterally.   Cardiovascular: regular rhythm, normal S1 and S2, no audible murmurs.   GI: soft, non-tender, non-distended, bowel sounds present, no masses appreciated.  Extremities: No lower extremity edema, peripheral pulses present.   Skin: no rashes.   Psychiatric: AAO x 3. Normal affect/mood.     LABS  CBC - WBC/HGB/HTC/PLT: 13.42/10.9/35.1/251 (10-17-23)  BMP: Na/K/Cl/Bicarb/BUN/Cr/Gluc: 130/4.0/100/23/28/0.98/161 (10-17-23)  Anion Gap: 7 (10-17-23)  eGFR: 61 (10-17-23)  Calcium: 9.4 (10-17-23)  Phosphorus: 3.5 (10-17-23)  Magnesium: 1.5 (10-17-23)  LFT - Alb/Tprot/Tbili/Dbili/AlkPhos/ALT/AST: 3.2/--/0.2/--/109/21/23 (10-17-23)    Thyroid Stimulating Hormone, Serum: 0.824 (07-10-23)    CBC - WBC/HGB/HTC/PLT: 13.42/10.9/35.1/251 (10-17-23)BMP: Na/K/Cl/Bicarb/BUN/Cr/Gluc: 130/4.0/100/23/28/0.98/161 (10-17-23)  Anion Gap: 7 (10-17-23)  eGFR: 61 (10-17-23)  Calcium: 9.4 (10-17-23)  Phosphorus: 3.5 (10-17-23)  Magnesium: 1.5 (10-17-23)    CAPILLARY BLOOD GLUCOSE & INSULIN RECEIVED  219 mg/dL (10-16 @ 09:31)  176 mg/dL (10-16 @ 13:35)  125 mg/dL (10-16 @ 17:51)  118 mg/dL (10-16 @ 22:23)  165 mg/dL (10-17 @ 09:50)        10-16-23 @ 07:01  -  10-17-23 @ 07:00  --------------------------------------------------------  IN: 0 mL / OUT: 250 mL / NET: -250 mL        ASSESSMENT / RECOMMENDATIONS    A1C: 10.0 %  BUN: 28  Creatinine: 0.98  GFR: 61  Weight: 63.5  BMI: 24.8  EF:     # Type 2 diabetes mellitus with hyperglycemia  - Please keep lantus   units at bedtime.   - Keep lispro   units before each meal.  - Continue lispro moderate dose sliding scale before meals and at bedtime.  - Patient's fingerstick glucose goal is 100-180 mg/dL.    - Discharge recommendations to be discussed.   - Patient can follow up at discharge with [facility] by calling [phone] to make an appointment.      Case discussed with Dr. Blackwood. Primary team updated.       Alix Harmon  Endocrinology Fellow    Service Pager: [phone]

## 2023-10-17 NOTE — PROGRESS NOTE ADULT - PROBLEM SELECTOR PLAN 7
Plan:  F: tolerating PO no fluids needed  E: replete K<4, Mg<2  N: Consistent Carbohydrate w/ no snacks  VTE Prophylaxis: lovenox  GI: none  C: Full Code  D: RMF

## 2023-10-17 NOTE — PROGRESS NOTE ADULT - PROBLEM SELECTOR PLAN 6
Plan:  F: tolerating PO no fluids needed  E: replete K<4, Mg<2  N: Consistent Carbohydrate w/ no snacks  VTE Prophylaxis: lovenox  GI: none  C: Full Code  D: RMF home meds: meds, amlodipine 10mg, losartan 100mg, Toprol 100mg    - Continue home meds

## 2023-10-17 NOTE — PROGRESS NOTE ADULT - PROBLEM SELECTOR PLAN 1
Intermittent flares, now with b/l joint, arm, and shoulder pain c/b inability to get out of bed today 2/2 pain. Currently well controlled after steroids/tylenol. Home meds: sulfasalazine and plaquenil; currently on sulfasalzine 1000 mg BID (recently increased from 500 mg BID) and plaquenil 200 mg BID   ESR 55, CRP 13.6  - C/w home Sulfasalazine and Plaquenil  - Completed 5 days 20 mg prednisone (last day 10/12)  - Steroid taper of 5 mg   - Pain regimen: Tylenol   - PT/OT - recommend home PT Intermittent flares, now with b/l joint, arm, and shoulder pain c/b inability to get out of bed today 2/2 pain. Currently well controlled after steroids/tylenol. Home meds: sulfasalazine and plaquenil; currently on sulfasalzine 1000 mg BID (recently increased from 500 mg BID) and plaquenil 200 mg BID   ESR 55, CRP 13.6  - C/w home Sulfasalazine and Plaquenil  - Completed 5 days 20 mg prednisone (last day 10/12)  - Taper prednisone from 10 mg to 5 mg; D/c on 5 mg prednisone  - Pain regimen: Tylenol   - PT/OT - recommend home PT

## 2023-10-17 NOTE — PROGRESS NOTE ADULT - ASSESSMENT
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

## 2023-10-17 NOTE — PROGRESS NOTE ADULT - PROBLEM SELECTOR PLAN 2
Dx with NSCLC with mets to brain on last admission, started CRT, last chemo being 09/07 at [facility] [place], unknown agent per pt, last RT completed on previous admission 07/2023    - Continue B12/folate supplementation  - Pt s/p 3 radiation treatments  - Spoke w/ outpatient physician Dr. Cathie Jeffrey ([phone]) stated pt is s/p one cycle of pembrolizumab in mid September and has been in and out of the hospital since. Believes the flares may be due to the chemo and will reassess the plan at her next appt.  - Pt would like to transfer care to [facility]; Outpatient follow up with Dr. Delaney

## 2023-10-17 NOTE — CONSULT NOTE ADULT - ATTENDING COMMENTS
Pt seen on rounds this afternoon.  Is known to us from a previous admission.  73-yo woman with HTN, HLP, type 2 DM, RA, and CKD stage 3 who has also been undergoing treatment for a NSSCA of the lung with mets to the brain.  Is s/p XRT to the brain, and was also started recently on immunotherapy for the CA.  Was admitted after a fall at home with a flare of RA.  Steroids were restarted to treat the RA flare (mostly shoulders and hands) and pt has required basal/bolus insulin to treat the exacerbation of hyperglycemia.    DM is currently being managed on a combination of Januvia, Farxiga and Lantus insulin.  A1c level is nonetheless quite elevated at 10%.  Fingersticks at home are in the  range in the morning, but near or over 200 later in the day  Prednisone has now been tapered to 5 mg BID.  Fingersticks have been in the 110-180 range for the past 24 hours  Does not seem to have typical RA deformities in her hands Pt seen on rounds this afternoon.  Is known to us from a previous admission.  73-yo woman with HTN, HLP, type 2 DM, RA, and CKD stage 3 who has also been undergoing treatment for a NSSCA of the lung with mets to the brain.  Is s/p XRT to the brain, and was also started recently on immunotherapy for the CA.  Was admitted after a fall at home with a flare of RA.  Steroids were restarted to treat the RA flare (mostly shoulders and hands) and pt has required basal/bolus insulin to treat the exacerbation of hyperglycemia.    DM is currently being managed on a combination of Januvia, Farxiga and Lantus insulin.  A1c level is nonetheless quite elevated at 10%.  Fingersticks at home are in the  range in the morning, but near or over 200 later in the day  Prednisone has now been tapered to 5 mg BID.   --With Prednisone to be tapered to 5 mg/day tomorrow, would decrease the Lantus to 18 units, the premeal lispro to 6 units  --Pt does not want to take premeal insulin at home, and could probably not manage it.  Will likely discharge on a combination of bedtime Lantus plus Januvia and Farxiga.  Will ?? also add Prandin for better post-prandial control..  --Would consider leaving the pt on a maintenance dose of Prednisone 5 mg/day.  This would not significantly affect her blood sugars, and may prevent flares of her RA

## 2023-10-17 NOTE — CONSULT NOTE ADULT - ASSESSMENT
This is a 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 (s/p SBRT 2700cGy on 8/15/2023 , s/p chemo x1 09/07/23 at [facility] [place]), admitted with flare up of RA.  The patient finished radiation to the brain. Needs an MRI of the brain for the evaluation of her status - consider doing it in-house.    No role for RT at present.
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 (s/p RT x1 07/2023, s/p chemo x1 09/07/23 at [facility] [place]), presents with diffuse joint pain, admitted with RA flare, admitted for pain control and PT/OT and consideration of continued management of NSCLC to follow with [facility] onc.    #DM: diagnosed in 2005 i/s/o hyperglycemia, managed with januvia 50mg PO qd + farxiga 5mg PO qd (reduced dose i/s/o poor kidney function). Previously attempted Lantus SS administered qHS. Reports most recent home FSG 6AM  (fasting) and 10PM 150-200. Given recent steroid administration, immunotherapy i/s/o NSCLC and poorly controlled DM patient required lantus 20U qHS + lispro 11U TID with meals.   - IF patient transitioning to prednisone 5mg PO qd should decrease Lantus to 15U qHS (which can be continued as o/p while continuing prednisone 5mg PO qd)  - on discharge patient should c/w januvia 50mg PO qd + farxiga 5mg PO qd, and prandin 1mg PO TID with meals shold also be added  - Continue lispro moderate dose sliding scale before meals and at bedtime.  - Patient's fingerstick glucose goal is 100-180 mg/dL.   - pt should follow with Dr. Johnston (rheum) for RA and steroid management   - Endo office to call for appropriate pt f/u 
73F with PMHx of HTN HLD, DM, R hip replacement, RA, CKD (Cr baseline ~2) presents to the  ED for unsteadiness and dizziness x1 week, underwent stroke workup with imaging findings negative for acute CVA however with incidental finding of brain mass suspicious for metastasis. Oncology consulted for NSCLC    Oncologic History:  - Presented on July 9th, 2023 to [facility] for 1 week of dizziness, incidentally found to have brain mass  - MR Head w/wo (7/11/23) with 0.9 cm enhancing lesion in the right frontal lobe surrounding vasogenic  edema which is suspicious for intracranial metastasis.   - CT Chest/Abd/Pelvis with IVC (7/11/23) showing somewhat spiculated, proximate 2.6 x 4.1 cm mass is seen in the posterior aspect of the left upper lobe, 1.5 cm low-attenuation lesion is seen in the left lobe of the liver, and 1.2 cm pancreas body cystic lesion; possible IPMN (intraductal papillary mucinous neoplasm)  - EBUS (7/14/23) with sevearl biopsies including lymph node 7 (negative), left lung lingula (positive for NSCLC, favor adenocarcinoma), BAL (positive for malignant cells). Left lung lingula forcep biopsy showing poorly differentiated adenocarcinoma  - NGS on biopsy (7/14/23) PDL1 <1%, High TMB, Tier II: Variants of Potential Clinical Significance: STK11, PIK3CA, TP53. Tier III: Variants of Unknown Clinical Significance: ALK.  - Radiation Therapy (8/15/23) completed 27 Gy / 27 Gy to the right brain  - [place] [facility] [place]  - MR Head w/wo (10/10/23) demonstrates since 7/11/2023, right posterior frontal enhancing lesion and vasogenic edema are completely resolved as a favorable response to therapy. No new enhancing lesion.    # Non small cell lung adenocarcinoma  Stage IV by brain metastasis noted on MRI. She [place] [facility] [place] (unsure of agent) and was planned for 2nd session 3 weeks later, but fell after getting up from bed, prompting admission to [facility]. Interval MR Brain 10/10/23 right posterior frontal enhancing lesion and vasogenic edema are completely resolved as a favorable response to therapy. Currently admitted for pain control from RA flare, per collateral from primary team conversation with [facility] oncologist (Dr. Cathie Jeffrey) that flare most likely 2/2 to recent chemo pembrulizomab in mid-September. Since then had multiple admissions for RA flare. Was instructed to follow up at her clinic to discuss next steps regarding chemo. On current admission, patient wishes to transfer oncologic care to [facility].  - Please obtain medical records from [facility]  - On Prednisone 20mg daily for 4 days per primary team with improvement in symptoms  - Will need to discuss whether to continue checkpoint inhibitor therapy outpatient. Patient reports flares can come with change in weather, but ICI has been associated with RA flares. Will discuss with Dr. Johnston.  - When medically ready for discharge, please schedule an appointment with Dr. Sedrick Delaney at [facility] within 7 days by calling . Please ensure that this appointment is included in discharge paperwork.    Discussed with hematology oncology attending Dr. Sedrick Delaney. Recommendations are considered final after attending attestation.

## 2023-10-17 NOTE — PROGRESS NOTE ADULT - PROBLEM SELECTOR PLAN 3
A1C results: 7.4 last admission 07/2023; endocrine consulted at that time deeming a finalized regimen of lispro 10 u before each meal; experienced nocturnal hypoglycemia and insulin regimen recently changed at [facility] given repeated hypoglycemia   BS normalizing 100-200s  One episode of BS in 400s - pt ate cupcake at bedside  - Increase lispro to 11 TID and increased Lantus to 22  - iSS Repeat A1c 10 - 10/8   BS normalizing 100-200s  One episode of BS in 400s - pt ate cupcake at bedside  - Increase lispro to 11 TID and increased Lantus to 22  - iSS sNa 130, likely 2/2 to poor PO intake vs SIADH from malignancy     - F/u urine lytes  - F/u serum osmolality

## 2023-10-17 NOTE — PROGRESS NOTE ADULT - PROBLEM SELECTOR PLAN 4
baseline Cr 2, currently 1.01, voiding     - Trend BUN/Cr  - Avoid nephrotoxic meds Repeat A1c 10 - 10/8   BS normalizing 100-200s  One episode of BS in 400s - pt ate cupcake at bedside  - Increase lispro to 11 TID and increased Lantus to 22  - iSS A1c from 7/2023 7.3, discharched on 5 units of lantus.   Repeat A1c 10 on 10/8  BS normalizing 100-200s while on steroids with supplemental insulin   One episode of BS in 400s - pt ate cupcake at bedside  - Increase lispro to 11 TID and increased Lantus to 22  - iSS

## 2023-10-17 NOTE — PROGRESS NOTE ADULT - PROBLEM/PLAN-6
DISPLAY PLAN FREE TEXT
Crescentic Advancement Flap Text: The defect edges were debeveled with a #15 scalpel blade.  Given the location of the defect and the proximity to free margins a crescentic advancement flap was deemed most appropriate.  Using a sterile surgical marker, the appropriate advancement flap was drawn incorporating the defect and placing the expected incisions within the relaxed skin tension lines where possible.    The area thus outlined was incised deep to adipose tissue with a #15 scalpel blade.  The skin margins were undermined to an appropriate distance in all directions utilizing iris scissors.

## 2023-10-17 NOTE — PROGRESS NOTE ADULT - PROBLEM SELECTOR PLAN 1
Detail Level: Simple Detail Level: Detailed Intermittent flares, now with b/l joint, arm, and shoulder pain c/b inability to get out of bed today 2/2 pain. Currently well controlled after steroids/tylenol. Home meds: sulfasalazine and plaquenil; currently on sulfasalzine 1000 mg BID (recently increased from 500 mg BID) and plaquenil 200 mg BID   ESR 55, CRP 13.6  - C/w home Sulfasalazine and Plaquenil  - C/w 5 days 20 mg prednisone (last day 10/12)  - F/u ESR/CRP  - Pain regimen: tylenol   - PT/OT  - Follow-up with rheumatologist (Dr. Johnston) on discharge

## 2023-10-18 ENCOUNTER — TRANSCRIPTION ENCOUNTER (OUTPATIENT)
Age: 73
End: 2023-10-18

## 2023-10-18 VITALS
RESPIRATION RATE: 18 BRPM | TEMPERATURE: 97 F | DIASTOLIC BLOOD PRESSURE: 59 MMHG | HEART RATE: 64 BPM | OXYGEN SATURATION: 99 % | SYSTOLIC BLOOD PRESSURE: 114 MMHG

## 2023-10-18 LAB
ALBUMIN SERPL ELPH-MCNC: 3.1 G/DL — LOW (ref 3.3–5)
ALBUMIN SERPL ELPH-MCNC: 3.1 G/DL — LOW (ref 3.3–5)
ALP SERPL-CCNC: 85 U/L — SIGNIFICANT CHANGE UP (ref 40–120)
ALP SERPL-CCNC: 85 U/L — SIGNIFICANT CHANGE UP (ref 40–120)
ALT FLD-CCNC: 18 U/L — SIGNIFICANT CHANGE UP (ref 10–45)
ALT FLD-CCNC: 18 U/L — SIGNIFICANT CHANGE UP (ref 10–45)
ANION GAP SERPL CALC-SCNC: 11 MMOL/L — SIGNIFICANT CHANGE UP (ref 5–17)
ANION GAP SERPL CALC-SCNC: 11 MMOL/L — SIGNIFICANT CHANGE UP (ref 5–17)
AST SERPL-CCNC: 20 U/L — SIGNIFICANT CHANGE UP (ref 10–40)
AST SERPL-CCNC: 20 U/L — SIGNIFICANT CHANGE UP (ref 10–40)
BASOPHILS # BLD AUTO: 0.08 K/UL — SIGNIFICANT CHANGE UP (ref 0–0.2)
BASOPHILS # BLD AUTO: 0.08 K/UL — SIGNIFICANT CHANGE UP (ref 0–0.2)
BASOPHILS NFR BLD AUTO: 0.7 % — SIGNIFICANT CHANGE UP (ref 0–2)
BASOPHILS NFR BLD AUTO: 0.7 % — SIGNIFICANT CHANGE UP (ref 0–2)
BILIRUB SERPL-MCNC: 0.3 MG/DL — SIGNIFICANT CHANGE UP (ref 0.2–1.2)
BILIRUB SERPL-MCNC: 0.3 MG/DL — SIGNIFICANT CHANGE UP (ref 0.2–1.2)
BUN SERPL-MCNC: 24 MG/DL — HIGH (ref 7–23)
BUN SERPL-MCNC: 24 MG/DL — HIGH (ref 7–23)
CALCIUM SERPL-MCNC: 9.2 MG/DL — SIGNIFICANT CHANGE UP (ref 8.4–10.5)
CALCIUM SERPL-MCNC: 9.2 MG/DL — SIGNIFICANT CHANGE UP (ref 8.4–10.5)
CHLORIDE SERPL-SCNC: 102 MMOL/L — SIGNIFICANT CHANGE UP (ref 96–108)
CHLORIDE SERPL-SCNC: 102 MMOL/L — SIGNIFICANT CHANGE UP (ref 96–108)
CO2 SERPL-SCNC: 21 MMOL/L — LOW (ref 22–31)
CO2 SERPL-SCNC: 21 MMOL/L — LOW (ref 22–31)
CREAT SERPL-MCNC: 0.96 MG/DL — SIGNIFICANT CHANGE UP (ref 0.5–1.3)
CREAT SERPL-MCNC: 0.96 MG/DL — SIGNIFICANT CHANGE UP (ref 0.5–1.3)
EGFR: 62 ML/MIN/1.73M2 — SIGNIFICANT CHANGE UP
EGFR: 62 ML/MIN/1.73M2 — SIGNIFICANT CHANGE UP
EOSINOPHIL # BLD AUTO: 0.24 K/UL — SIGNIFICANT CHANGE UP (ref 0–0.5)
EOSINOPHIL # BLD AUTO: 0.24 K/UL — SIGNIFICANT CHANGE UP (ref 0–0.5)
EOSINOPHIL NFR BLD AUTO: 2.1 % — SIGNIFICANT CHANGE UP (ref 0–6)
EOSINOPHIL NFR BLD AUTO: 2.1 % — SIGNIFICANT CHANGE UP (ref 0–6)
GLUCOSE BLDC GLUCOMTR-MCNC: 133 MG/DL — HIGH (ref 70–99)
GLUCOSE BLDC GLUCOMTR-MCNC: 133 MG/DL — HIGH (ref 70–99)
GLUCOSE BLDC GLUCOMTR-MCNC: 282 MG/DL — HIGH (ref 70–99)
GLUCOSE BLDC GLUCOMTR-MCNC: 282 MG/DL — HIGH (ref 70–99)
GLUCOSE SERPL-MCNC: 176 MG/DL — HIGH (ref 70–99)
GLUCOSE SERPL-MCNC: 176 MG/DL — HIGH (ref 70–99)
HCT VFR BLD CALC: 36.3 % — SIGNIFICANT CHANGE UP (ref 34.5–45)
HCT VFR BLD CALC: 36.3 % — SIGNIFICANT CHANGE UP (ref 34.5–45)
HGB BLD-MCNC: 11.3 G/DL — LOW (ref 11.5–15.5)
HGB BLD-MCNC: 11.3 G/DL — LOW (ref 11.5–15.5)
IMM GRANULOCYTES NFR BLD AUTO: 2 % — HIGH (ref 0–0.9)
IMM GRANULOCYTES NFR BLD AUTO: 2 % — HIGH (ref 0–0.9)
LYMPHOCYTES # BLD AUTO: 1.87 K/UL — SIGNIFICANT CHANGE UP (ref 1–3.3)
LYMPHOCYTES # BLD AUTO: 1.87 K/UL — SIGNIFICANT CHANGE UP (ref 1–3.3)
LYMPHOCYTES # BLD AUTO: 16.4 % — SIGNIFICANT CHANGE UP (ref 13–44)
LYMPHOCYTES # BLD AUTO: 16.4 % — SIGNIFICANT CHANGE UP (ref 13–44)
MAGNESIUM SERPL-MCNC: 1.6 MG/DL — SIGNIFICANT CHANGE UP (ref 1.6–2.6)
MAGNESIUM SERPL-MCNC: 1.6 MG/DL — SIGNIFICANT CHANGE UP (ref 1.6–2.6)
MCHC RBC-ENTMCNC: 29.5 PG — SIGNIFICANT CHANGE UP (ref 27–34)
MCHC RBC-ENTMCNC: 29.5 PG — SIGNIFICANT CHANGE UP (ref 27–34)
MCHC RBC-ENTMCNC: 31.1 GM/DL — LOW (ref 32–36)
MCHC RBC-ENTMCNC: 31.1 GM/DL — LOW (ref 32–36)
MCV RBC AUTO: 94.8 FL — SIGNIFICANT CHANGE UP (ref 80–100)
MCV RBC AUTO: 94.8 FL — SIGNIFICANT CHANGE UP (ref 80–100)
MONOCYTES # BLD AUTO: 0.85 K/UL — SIGNIFICANT CHANGE UP (ref 0–0.9)
MONOCYTES # BLD AUTO: 0.85 K/UL — SIGNIFICANT CHANGE UP (ref 0–0.9)
MONOCYTES NFR BLD AUTO: 7.5 % — SIGNIFICANT CHANGE UP (ref 2–14)
MONOCYTES NFR BLD AUTO: 7.5 % — SIGNIFICANT CHANGE UP (ref 2–14)
NEUTROPHILS # BLD AUTO: 8.11 K/UL — HIGH (ref 1.8–7.4)
NEUTROPHILS # BLD AUTO: 8.11 K/UL — HIGH (ref 1.8–7.4)
NEUTROPHILS NFR BLD AUTO: 71.3 % — SIGNIFICANT CHANGE UP (ref 43–77)
NEUTROPHILS NFR BLD AUTO: 71.3 % — SIGNIFICANT CHANGE UP (ref 43–77)
NRBC # BLD: 0 /100 WBCS — SIGNIFICANT CHANGE UP (ref 0–0)
NRBC # BLD: 0 /100 WBCS — SIGNIFICANT CHANGE UP (ref 0–0)
PHOSPHATE SERPL-MCNC: 5.3 MG/DL — HIGH (ref 2.5–4.5)
PHOSPHATE SERPL-MCNC: 5.3 MG/DL — HIGH (ref 2.5–4.5)
PLATELET # BLD AUTO: 208 K/UL — SIGNIFICANT CHANGE UP (ref 150–400)
PLATELET # BLD AUTO: 208 K/UL — SIGNIFICANT CHANGE UP (ref 150–400)
POTASSIUM SERPL-MCNC: 4 MMOL/L — SIGNIFICANT CHANGE UP (ref 3.5–5.3)
POTASSIUM SERPL-MCNC: 4 MMOL/L — SIGNIFICANT CHANGE UP (ref 3.5–5.3)
POTASSIUM SERPL-SCNC: 4 MMOL/L — SIGNIFICANT CHANGE UP (ref 3.5–5.3)
POTASSIUM SERPL-SCNC: 4 MMOL/L — SIGNIFICANT CHANGE UP (ref 3.5–5.3)
PROT SERPL-MCNC: 6.6 G/DL — SIGNIFICANT CHANGE UP (ref 6–8.3)
PROT SERPL-MCNC: 6.6 G/DL — SIGNIFICANT CHANGE UP (ref 6–8.3)
RBC # BLD: 3.83 M/UL — SIGNIFICANT CHANGE UP (ref 3.8–5.2)
RBC # BLD: 3.83 M/UL — SIGNIFICANT CHANGE UP (ref 3.8–5.2)
RBC # FLD: 15.9 % — HIGH (ref 10.3–14.5)
RBC # FLD: 15.9 % — HIGH (ref 10.3–14.5)
SODIUM SERPL-SCNC: 134 MMOL/L — LOW (ref 135–145)
SODIUM SERPL-SCNC: 134 MMOL/L — LOW (ref 135–145)
WBC # BLD: 11.38 K/UL — HIGH (ref 3.8–10.5)
WBC # BLD: 11.38 K/UL — HIGH (ref 3.8–10.5)
WBC # FLD AUTO: 11.38 K/UL — HIGH (ref 3.8–10.5)
WBC # FLD AUTO: 11.38 K/UL — HIGH (ref 3.8–10.5)

## 2023-10-18 PROCEDURE — 99232 SBSQ HOSP IP/OBS MODERATE 35: CPT

## 2023-10-18 PROCEDURE — 99232 SBSQ HOSP IP/OBS MODERATE 35: CPT | Mod: GC

## 2023-10-18 RX ORDER — DAPAGLIFLOZIN 10 MG/1
1 TABLET, FILM COATED ORAL
Refills: 0 | DISCHARGE

## 2023-10-18 RX ORDER — DAPAGLIFLOZIN 10 MG/1
1 TABLET, FILM COATED ORAL
Qty: 30 | Refills: 0
Start: 2023-10-18 | End: 2023-11-16

## 2023-10-18 RX ORDER — SITAGLIPTIN 50 MG/1
1 TABLET, FILM COATED ORAL
Refills: 0 | DISCHARGE

## 2023-10-18 RX ORDER — SITAGLIPTIN 50 MG/1
1 TABLET, FILM COATED ORAL
Qty: 30 | Refills: 0
Start: 2023-10-18 | End: 2023-11-16

## 2023-10-18 RX ADMIN — PREGABALIN 1000 MICROGRAM(S): 225 CAPSULE ORAL at 11:57

## 2023-10-18 RX ADMIN — Medication 5 MILLIGRAM(S): at 09:22

## 2023-10-18 RX ADMIN — Medication 1 MILLIGRAM(S): at 11:57

## 2023-10-18 RX ADMIN — Medication 3: at 14:00

## 2023-10-18 RX ADMIN — Medication 6 UNIT(S): at 09:38

## 2023-10-18 RX ADMIN — Medication 200 MILLIGRAM(S): at 06:12

## 2023-10-18 RX ADMIN — Medication 6 UNIT(S): at 14:00

## 2023-10-18 RX ADMIN — LIDOCAINE 1 PATCH: 4 CREAM TOPICAL at 01:33

## 2023-10-18 RX ADMIN — Medication 100 MILLIGRAM(S): at 06:12

## 2023-10-18 RX ADMIN — Medication 81 MILLIGRAM(S): at 11:57

## 2023-10-18 NOTE — PROGRESS NOTE ADULT - SUBJECTIVE AND OBJECTIVE BOX
SUBJECTIVE / INTERVAL HPI: Patient was seen and examined this morning. Denies pain. Eating well. Planned for discharge today.    CAPILLARY BLOOD GLUCOSE & INSULIN RECEIVED  105 mg/dL (10-17 @ 17:43) - Lispro 6. Ate fish and most of rice.  114 mg/dL (10-17 @ 19:06)  141 mg/dL (10-17 @ 21:38) - Lantus 18  133 mg/dL (10-18 @ 09:16) - Lispro 6. Ate pancake and fruit.  176 mg/dL (10-18 @ 10:00)  282 mg/dL (10-18 @ 13:45)      REVIEW OF SYSTEMS  Constitutional:  Negative fever, chills or loss of appetite.  Eyes:  Negative blurry vision or double vision.  Cardiovascular:  Negative for chest pain or palpitations.  Respiratory:  Negative for cough, wheezing, or shortness of breath.    Gastrointestinal:  Negative for nausea, vomiting, diarrhea, constipation, or abdominal pain.  Genitourinary:  Negative frequency, urgency or dysuria.  Neurologic:  No headache, confusion, dizziness, lightheadedness.    PHYSICAL EXAM  Vital Signs Last 24 Hrs  T(C): 36.3 (18 Oct 2023 15:04), Max: 36.6 (17 Oct 2023 20:50)  T(F): 97.4 (18 Oct 2023 15:04), Max: 97.9 (17 Oct 2023 20:50)  HR: 64 (18 Oct 2023 15:04) (64 - 83)  BP: 114/59 (18 Oct 2023 15:04) (114/59 - 151/77)  BP(mean): --  RR: 18 (18 Oct 2023 15:04) (18 - 18)  SpO2: 99% (18 Oct 2023 15:04) (95% - 99%)    Parameters below as of 18 Oct 2023 15:04  Patient On (Oxygen Delivery Method): room air        Constitutional: Awake, alert, in no acute distress.   HEENT: Normocephalic, atraumatic, NEDRA.  Respiratory: Lungs clear to ausculation bilaterally.   Cardiovascular: regular rhythm, normal S1 and S2, no audible murmurs.   GI: soft, non-tender, non-distended, bowel sounds present.  Extremities: No lower extremity edema.  Psychiatric: AAO x 3. Normal affect/mood.     LABS  CBC - WBC/HGB/HTC/PLT: 11.38/11.3/36.3/208 (10-18-23)  BMP - Na/K/Cl/Bicarb/BUN/Cr/Gluc/AG/eGFR: 134/4.0/102/21/24/0.96/176/11/62 (10-18-23)  Ca - 9.2 (10-18-23)  Phos - 5.3 (10-18-23)  Mg - 1.6 (10-18-23)  LFT - Alb/Tprot/Tbili/Dbili/AlkPhos/ALT/AST: 3.1/--/0.3/--/85/18/20 (10-18-23)        MEDICATIONS  MEDICATIONS  (STANDING):  amLODIPine   Tablet 10 milliGRAM(s) Oral every 24 hours  aspirin  chewable 81 milliGRAM(s) Oral daily  atorvastatin 40 milliGRAM(s) Oral at bedtime  cyanocobalamin 1000 MICROGram(s) Oral daily  dextrose 5%. 1000 milliLiter(s) (50 mL/Hr) IV Continuous <Continuous>  dextrose 5%. 1000 milliLiter(s) (100 mL/Hr) IV Continuous <Continuous>  dextrose 50% Injectable 25 Gram(s) IV Push once  dextrose 50% Injectable 12.5 Gram(s) IV Push once  dextrose 50% Injectable 25 Gram(s) IV Push once  dextrose 50% Injectable 25 Gram(s) IV Push once  enoxaparin Injectable 40 milliGRAM(s) SubCutaneous every 24 hours  folic acid 1 milliGRAM(s) Oral daily  glucagon  Injectable 1 milliGRAM(s) IntraMuscular once  hydroxychloroquine 200 milliGRAM(s) Oral two times a day  influenza  Vaccine (HIGH DOSE) 0.7 milliLiter(s) IntraMuscular once  insulin glargine Injectable (LANTUS) 18 Unit(s) SubCutaneous at bedtime  insulin lispro (ADMELOG) corrective regimen sliding scale   SubCutaneous Before meals and at bedtime  insulin lispro Injectable (ADMELOG) 6 Unit(s) SubCutaneous three times a day before meals  lidocaine   4% Patch 1 Patch Transdermal every 24 hours  losartan 100 milliGRAM(s) Oral every 24 hours  metoprolol succinate  milliGRAM(s) Oral daily  polyethylene glycol 3350 17 Gram(s) Oral daily  predniSONE   Tablet 5 milliGRAM(s) Oral daily  senna 2 Tablet(s) Oral at bedtime  SULFASALAZINE 50 DELAYED RELEASE TABLETS 2 Tablet(s) 2 Tablet(s) Oral every 12 hours    MEDICATIONS  (PRN):  acetaminophen     Tablet .. 650 milliGRAM(s) Oral every 6 hours PRN Temp greater or equal to 38C (100.4F), Mild Pain (1 - 3)  aluminum hydroxide/magnesium hydroxide/simethicone Suspension 30 milliLiter(s) Oral every 6 hours PRN Dyspepsia  dextrose Oral Gel 15 Gram(s) Oral once PRN Blood Glucose LESS THAN 70 milliGRAM(s)/deciliter  melatonin 3 milliGRAM(s) Oral at bedtime PRN Insomnia

## 2023-10-18 NOTE — DISCHARGE NOTE NURSING/CASE MANAGEMENT/SOCIAL WORK - NSDCFUADDAPPT_GEN_ALL_CORE_FT
Please bring your Insurance card, Photo ID and Discharge paperwork to the following appointments:    (1) Please follow up with your Hematology/Oncology Provider, Dr. Cathie Jeffrey at [address] [place] [address] on 10/18/2023 at 3:15pm.    Appointment was scheduled by Ms. CHUCK Spain, Referral Coordinator.    (2) Please follow up with your Primary Care Provider, Dr. Marissa Douglass at [address] [place] [address] on 11/01/2023 at 2:00pm.    Appointment was scheduled by Ms. CHUCK Spain, Referral Coordinator.

## 2023-10-18 NOTE — PROGRESS NOTE ADULT - TIME BILLING
Patient seen and examined;  Discussed with patients daughter ;  Will need help at home;  Patient had been applying fo Medicaid   Also MRI of Brain to be done;  She will continue her oncologic care at [facility]
Patient seen and examined  Will continue Prednisone for another two days then discontinue;  IF no further exacerbations then will discharge to home;  She will follow with oncology at [place]
Patient seen and examined;  Okay to discharge ;  Will see in office and taper steroids
Patient seen and examined with house-staff during bedside rounds.  Resident note read, including vitals, physical findings, laboratory data, and radiological reports.   Revisions included below.  Direct personal management at bed side and extensive interpretation of the data.  Plan was outlined and discussed in details with the housestaff.  Decision making of high complexity  Action taken for acute disease activity to reflect the level of care provided:  - medication reconciliation  - review laboratory data  .
Patient seen and examined;  Discharge today  Will follow up with oncology tomorrow   Endocrine plans noted
Patient seen and examined;  Plans as outlined above;  Needs to stay in hospital
Patient seen and examined;  Plans as outlined;  Wait on discharge for now;  Oncology to see patient today  Check MRI results   Adjust insulin   Continue steroids fro now
Patient seen and examined;   Improved with steroids  Physical therapy   Steroid as outlined   Follow blood sugars;  Will likely need more insulin while on steroids   Contact Johana about current treatment
Patient seen and examined;  Looks better;  Glucose control while on steroids  RT consult  Obtain additional information from Johana
Patient seen and examined  Discharge plans as outlined in my above note  Needs physical therapy follow up
Patient seen and examined with house-staff during bedside rounds.  Resident note read, including vitals, physical findings, laboratory data, and radiological reports.   Revisions included below.  Direct personal management at bed side and extensive interpretation of the data.  Plan was outlined and discussed in details with the housestaff.  Decision making of high complexity  Action taken for acute disease activity to reflect the level of care provided:  - medication reconciliation  - review laboratory data  .  Creatinine on the blood sugar is stable.Continue on taper steroids.

## 2023-10-18 NOTE — DISCHARGE NOTE NURSING/CASE MANAGEMENT/SOCIAL WORK - PATIENT PORTAL LINK FT
You can access the FollowMyHealth Patient Portal offered by [facility] by registering at the following website: [email or web address]. By joining [facility]’s FollowMyHealth portal, you will also be able to view your health information using other applications (apps) compatible with our system.

## 2023-10-18 NOTE — PROGRESS NOTE ADULT - PROBLEM/PLAN-3
Ems trans from nursing home c/o unwitnessed fall today, pt states lt arm pain
DISPLAY PLAN FREE TEXT

## 2023-10-18 NOTE — PROGRESS NOTE ADULT - SUBJECTIVE AND OBJECTIVE BOX
INTERVAL HPI/OVERNIGHT EVENTS:  No complaint; Ready to be discharged   Will go home on Prednisone 5 mg a day  Insulin regimen as outlined       MEDICATIONS  (STANDING):  amLODIPine   Tablet 10 milliGRAM(s) Oral every 24 hours  aspirin  chewable 81 milliGRAM(s) Oral daily  atorvastatin 40 milliGRAM(s) Oral at bedtime  cyanocobalamin 1000 MICROGram(s) Oral daily  dextrose 5%. 1000 milliLiter(s) (50 mL/Hr) IV Continuous <Continuous>  dextrose 5%. 1000 milliLiter(s) (100 mL/Hr) IV Continuous <Continuous>  dextrose 50% Injectable 25 Gram(s) IV Push once  dextrose 50% Injectable 25 Gram(s) IV Push once  dextrose 50% Injectable 12.5 Gram(s) IV Push once  dextrose 50% Injectable 25 Gram(s) IV Push once  enoxaparin Injectable 40 milliGRAM(s) SubCutaneous every 24 hours  folic acid 1 milliGRAM(s) Oral daily  glucagon  Injectable 1 milliGRAM(s) IntraMuscular once  hydroxychloroquine 200 milliGRAM(s) Oral two times a day  influenza  Vaccine (HIGH DOSE) 0.7 milliLiter(s) IntraMuscular once  insulin glargine Injectable (LANTUS) 18 Unit(s) SubCutaneous at bedtime  insulin lispro (ADMELOG) corrective regimen sliding scale   SubCutaneous Before meals and at bedtime  insulin lispro Injectable (ADMELOG) 6 Unit(s) SubCutaneous three times a day before meals  lidocaine   4% Patch 1 Patch Transdermal every 24 hours  losartan 100 milliGRAM(s) Oral every 24 hours  metoprolol succinate  milliGRAM(s) Oral daily  polyethylene glycol 3350 17 Gram(s) Oral daily  predniSONE   Tablet 5 milliGRAM(s) Oral daily  senna 2 Tablet(s) Oral at bedtime  SULFASALAZINE 50 DELAYED RELEASE TABLETS 2 Tablet(s) 2 Tablet(s) Oral every 12 hours    MEDICATIONS  (PRN):  acetaminophen     Tablet .. 650 milliGRAM(s) Oral every 6 hours PRN Temp greater or equal to 38C (100.4F), Mild Pain (1 - 3)  aluminum hydroxide/magnesium hydroxide/simethicone Suspension 30 milliLiter(s) Oral every 6 hours PRN Dyspepsia  dextrose Oral Gel 15 Gram(s) Oral once PRN Blood Glucose LESS THAN 70 milliGRAM(s)/deciliter  melatonin 3 milliGRAM(s) Oral at bedtime PRN Insomnia      Allergies    Bactrim (Other)    Intolerances        Vital Signs Last 24 Hrs  T(C): 36.6 (18 Oct 2023 08:41), Max: 36.9 (17 Oct 2023 16:00)  T(F): 97.9 (18 Oct 2023 08:41), Max: 98.4 (17 Oct 2023 16:00)  HR: 73 (18 Oct 2023 08:41) (68 - 83)  BP: 145/88 (18 Oct 2023 08:41) (119/80 - 151/77)  BP(mean): --  RR: 18 (18 Oct 2023 08:41) (18 - 18)  SpO2: 97% (18 Oct 2023 08:41) (95% - 98%)    Parameters below as of 18 Oct 2023 08:41  Patient On (Oxygen Delivery Method): room air              Constitutional: Awake     Eyes: NEDRA    ENMT: Negative    Neck: Supple    Back:  no tenderness     Respiratory:  clear     Cardiovascular: S1 S2    Gastrointestinal:   soft     Genitourinary:    Extremities:  no edema     Vascular:    Neurological:    Skin:    Lymph Nodes:            LABS:                        10.9   13.42 )-----------( 251      ( 17 Oct 2023 05:30 )             35.1     10-17    130<L>  |  100  |  28<H>  ----------------------------<  161<H>  4.0   |  23  |  0.98    Ca    9.4      17 Oct 2023 05:30  Phos  3.5     10-17  Mg     1.5     10-17    TPro  6.1  /  Alb  3.2<L>  /  TBili  0.2  /  DBili  x   /  AST  23  /  ALT  21  /  AlkPhos  109  10-17      Urinalysis Basic - ( 17 Oct 2023 05:30 )    Color: x / Appearance: x / SG: x / pH: x  Gluc: 161 mg/dL / Ketone: x  / Bili: x / Urobili: x   Blood: x / Protein: x / Nitrite: x   Leuk Esterase: x / RBC: x / WBC x   Sq Epi: x / Non Sq Epi: x / Bacteria: x        RADIOLOGY & ADDITIONAL TESTS:

## 2023-10-18 NOTE — PROGRESS NOTE ADULT - PROVIDER SPECIALTY LIST ADULT
Internal Medicine
Endocrinology
Internal Medicine

## 2023-10-18 NOTE — PROGRESS NOTE ADULT - PROBLEM SELECTOR PROBLEM 2
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

## 2023-10-18 NOTE — PROGRESS NOTE ADULT - REASON FOR ADMISSION
RA flare

## 2023-10-18 NOTE — PROGRESS NOTE ADULT - NUTRITIONAL ASSESSMENT
This patient has been assessed with a concern for Malnutrition and has been determined to have a diagnosis/diagnoses of Severe protein-calorie malnutrition.    This patient is being managed with:   Diet Consistent Carbohydrate w/Evening Snack-  Supplement Feeding Modality:  Oral  Glucerna Shake Cans or Servings Per Day:  1       Frequency:  Daily  Entered: Oct  9 2023  2:56PM  
This patient has been assessed with a concern for Malnutrition and has been determined to have a diagnosis/diagnoses of Severe protein-calorie malnutrition.    This patient is being managed with:   Diet Consistent Carbohydrate/No Snacks-  Entered: Oct 16 2023  2:46PM  
This patient has been assessed with a concern for Malnutrition and has been determined to have a diagnosis/diagnoses of Severe protein-calorie malnutrition.    This patient is being managed with:   Diet Consistent Carbohydrate w/Evening Snack-  Supplement Feeding Modality:  Oral  Glucerna Shake Cans or Servings Per Day:  1       Frequency:  Daily  Entered: Oct  9 2023  2:56PM  
This patient has been assessed with a concern for Malnutrition and has been determined to have a diagnosis/diagnoses of Severe protein-calorie malnutrition.    This patient is being managed with:   Diet Consistent Carbohydrate/No Snacks-  Entered: Oct 16 2023  2:46PM  
This patient has been assessed with a concern for Malnutrition and has been determined to have a diagnosis/diagnoses of Severe protein-calorie malnutrition.    This patient is being managed with:   Diet Consistent Carbohydrate/No Snacks-  Entered: Oct 16 2023  2:46PM  
This patient has been assessed with a concern for Malnutrition and has been determined to have a diagnosis/diagnoses of Severe protein-calorie malnutrition.    This patient is being managed with:   Diet Consistent Carbohydrate w/Evening Snack-  Supplement Feeding Modality:  Oral  Glucerna Shake Cans or Servings Per Day:  1       Frequency:  Daily  Entered: Oct  9 2023  2:56PM  
This patient has been assessed with a concern for Malnutrition and has been determined to have a diagnosis/diagnoses of Severe protein-calorie malnutrition.    This patient is being managed with:   Diet Consistent Carbohydrate/No Snacks-  Entered: Oct 16 2023  2:46PM  
This patient has been assessed with a concern for Malnutrition and has been determined to have a diagnosis/diagnoses of Severe protein-calorie malnutrition.    This patient is being managed with:   Diet Consistent Carbohydrate w/Evening Snack-  Supplement Feeding Modality:  Oral  Glucerna Shake Cans or Servings Per Day:  1       Frequency:  Daily  Entered: Oct  9 2023  2:56PM  

## 2023-10-18 NOTE — DISCHARGE NOTE NURSING/CASE MANAGEMENT/SOCIAL WORK - NSDCPEFALRISK_GEN_ALL_CORE
For information on Fall & Injury Prevention, visit: [email or web address] OR  [email or web address] OR  https://www.cdc.gov/steadi/patient.html

## 2023-10-18 NOTE — PROGRESS NOTE ADULT - PROBLEM SELECTOR PLAN 1
# Type 2 diabetes mellitus with hyperglycemia  - Please decrease lantus to 15 units at bedtime.   - Continue lispro 6 units before each meal.  - Continue lispro moderate dose sliding scale before meals and at bedtime.  - Patient's fingerstick glucose goal is 100-180 mg/dL.    - For discharge: Lantus 15 units at bedtime, Farxiga 10mg daily and januvia 100mg daily. CGM not covered under part d; can investigate coverage under part b as OP.  - Patient can follow up at discharge with [facility] by calling [phone] to make an appointment.  Office will contact her ot schedule appt.    Case discussed with Dr. Blackwood. Primary team updated.

## 2023-10-18 NOTE — PROGRESS NOTE ADULT - ASSESSMENT
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 (s/p RT x1 07/2023, s/p [place] 09[place]07/[place] [facility] presents with diffuse joint pain, admitted with RA flare, admitted for pain control and PT/OT and consideration of continued management of NSCLC to follow with [facility] onc.    Endocrine consulted for uncontrolled type 2 diabetes and steroid induced hyperglycemia. Will be discharged home on prednisone 5mg daily.    Will continue lantus and increase to full dose januvia and farxiga since kidney function is normal at this time.     A1C: 10.0 %  BUN: 24  Creatinine: 0.96  GFR: 62  Weight: 63.5  BMI: 24.8

## 2023-10-18 NOTE — PROGRESS NOTE ADULT - PROBLEM SELECTOR PROBLEM 1
Rheumatoid arthritis flare
Type 2 diabetes mellitus
Rheumatoid arthritis flare

## 2023-10-19 ENCOUNTER — NON-APPOINTMENT (OUTPATIENT)
Age: 73
End: 2023-10-19

## 2023-10-19 ENCOUNTER — APPOINTMENT (OUTPATIENT)
Dept: HEMATOLOGY ONCOLOGY | Facility: CLINIC | Age: 73
End: 2023-10-19
Payer: MEDICARE

## 2023-10-19 VITALS
WEIGHT: 150 LBS | HEIGHT: 63 IN | RESPIRATION RATE: 18 BRPM | SYSTOLIC BLOOD PRESSURE: 113 MMHG | DIASTOLIC BLOOD PRESSURE: 74 MMHG | OXYGEN SATURATION: 94 % | BODY MASS INDEX: 26.58 KG/M2 | HEART RATE: 86 BPM | TEMPERATURE: 97.3 F

## 2023-10-19 PROCEDURE — 99215 OFFICE O/P EST HI 40 MIN: CPT

## 2023-10-19 RX ORDER — ONDANSETRON 4 MG/1
4 TABLET, ORALLY DISINTEGRATING ORAL EVERY 8 HOURS
Qty: 60 | Refills: 2 | Status: ACTIVE | COMMUNITY
Start: 2023-10-19 | End: 1900-01-01

## 2023-10-19 RX ORDER — LOPERAMIDE HYDROCHLORIDE 2 MG/1
2 TABLET ORAL
Qty: 30 | Refills: 3 | Status: ACTIVE | COMMUNITY
Start: 2023-10-19 | End: 1900-01-01

## 2023-10-24 ENCOUNTER — TRANSCRIPTION ENCOUNTER (OUTPATIENT)
Age: 73
End: 2023-10-24

## 2023-10-24 DIAGNOSIS — E43 UNSPECIFIED SEVERE PROTEIN-CALORIE MALNUTRITION: ICD-10-CM

## 2023-10-24 DIAGNOSIS — E78.5 HYPERLIPIDEMIA, UNSPECIFIED: ICD-10-CM
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
## 2023-10-24 DIAGNOSIS — T38.0X5A ADVERSE EFFECT OF GLUCOCORTICOIDS AND SYNTHETIC ANALOGUES, INITIAL ENCOUNTER: ICD-10-CM

## 2023-10-24 DIAGNOSIS — E11.22 TYPE 2 DIABETES MELLITUS WITH DIABETIC CHRONIC KIDNEY DISEASE: ICD-10-CM

## 2023-10-24 DIAGNOSIS — N18.4 CHRONIC KIDNEY DISEASE, STAGE 4 (SEVERE): ICD-10-CM

## 2023-10-24 DIAGNOSIS — I12.9 HYPERTENSIVE CHRONIC KIDNEY DISEASE WITH STAGE 1 THROUGH STAGE 4 CHRONIC KIDNEY DISEASE, OR UNSPECIFIED CHRONIC KIDNEY DISEASE: ICD-10-CM

## 2023-10-24 DIAGNOSIS — M06.9 RHEUMATOID ARTHRITIS, UNSPECIFIED: ICD-10-CM

## 2023-10-24 DIAGNOSIS — G47.00 INSOMNIA, UNSPECIFIED: ICD-10-CM

## 2023-10-24 DIAGNOSIS — Z79.82 LONG TERM (CURRENT) USE OF ASPIRIN: ICD-10-CM

## 2023-10-24 DIAGNOSIS — Z92.21 PERSONAL HISTORY OF ANTINEOPLASTIC CHEMOTHERAPY: ICD-10-CM

## 2023-10-24 DIAGNOSIS — E11.65 TYPE 2 DIABETES MELLITUS WITH HYPERGLYCEMIA: ICD-10-CM

## 2023-10-24 DIAGNOSIS — C79.31 SECONDARY MALIGNANT NEOPLASM OF BRAIN: ICD-10-CM

## 2023-10-24 DIAGNOSIS — Z79.4 LONG TERM (CURRENT) USE OF INSULIN: ICD-10-CM

## 2023-10-24 DIAGNOSIS — C34.90 MALIGNANT NEOPLASM OF UNSPECIFIED PART OF UNSPECIFIED BRONCHUS OR LUNG: ICD-10-CM

## 2023-10-24 DIAGNOSIS — Z96.641 PRESENCE OF RIGHT ARTIFICIAL HIP JOINT: ICD-10-CM

## 2023-10-24 DIAGNOSIS — Z92.3 PERSONAL HISTORY OF IRRADIATION: ICD-10-CM

## 2023-10-25 RX ORDER — PACLITAXEL 6 MG/ML
230 INJECTION, SOLUTION, CONCENTRATE INTRAVENOUS ONCE
Refills: 0 | Status: COMPLETED | OUTPATIENT
Start: 2023-11-22 | End: 2023-11-22

## 2023-10-25 RX ORDER — CARBOPLATIN 50 MG
300 VIAL (EA) INTRAVENOUS ONCE
Refills: 0 | Status: COMPLETED | OUTPATIENT
Start: 2024-01-09 | End: 2024-01-09

## 2023-10-25 RX ORDER — CARBOPLATIN 50 MG
330 VIAL (EA) INTRAVENOUS ONCE
Refills: 0 | Status: COMPLETED | OUTPATIENT
Start: 2023-10-26 | End: 2023-10-26

## 2023-10-25 RX ORDER — DIPHENHYDRAMINE HCL 50 MG
25 CAPSULE ORAL ONCE
Refills: 0 | Status: COMPLETED | OUTPATIENT
Start: 2024-01-09 | End: 2024-01-09

## 2023-10-25 RX ORDER — CARBOPLATIN 50 MG
300 VIAL (EA) INTRAVENOUS ONCE
Refills: 0 | Status: COMPLETED | OUTPATIENT
Start: 2023-11-22 | End: 2023-11-22

## 2023-10-26 ENCOUNTER — APPOINTMENT (OUTPATIENT)
Dept: HEMATOLOGY ONCOLOGY | Facility: CLINIC | Age: 73
End: 2023-10-26
Payer: MEDICARE

## 2023-10-26 ENCOUNTER — APPOINTMENT (OUTPATIENT)
Dept: INFUSION THERAPY | Facility: CLINIC | Age: 73
End: 2023-10-26

## 2023-10-26 ENCOUNTER — OUTPATIENT (OUTPATIENT)
Dept: OUTPATIENT SERVICES | Facility: HOSPITAL | Age: 73
LOS: 1 days | End: 2023-10-26
Payer: MEDICARE

## 2023-10-26 VITALS
HEIGHT: 63 IN | OXYGEN SATURATION: 97 % | TEMPERATURE: 98 F | RESPIRATION RATE: 18 BRPM | DIASTOLIC BLOOD PRESSURE: 82 MMHG | WEIGHT: 143.96 LBS | HEART RATE: 69 BPM | SYSTOLIC BLOOD PRESSURE: 133 MMHG

## 2023-10-26 VITALS
OXYGEN SATURATION: 98 % | DIASTOLIC BLOOD PRESSURE: 68 MMHG | TEMPERATURE: 98 F | HEART RATE: 72 BPM | SYSTOLIC BLOOD PRESSURE: 155 MMHG | RESPIRATION RATE: 18 BRPM

## 2023-10-26 DIAGNOSIS — C34.90 MALIGNANT NEOPLASM OF UNSPECIFIED PART OF UNSPECIFIED BRONCHUS OR LUNG: ICD-10-CM

## 2023-10-26 DIAGNOSIS — Z96.641 PRESENCE OF RIGHT ARTIFICIAL HIP JOINT: Chronic | ICD-10-CM

## 2023-10-26 LAB
ALBUMIN SERPL ELPH-MCNC: 3.5 G/DL — SIGNIFICANT CHANGE UP (ref 3.3–5)
ALBUMIN SERPL ELPH-MCNC: 3.5 G/DL — SIGNIFICANT CHANGE UP (ref 3.3–5)
ALP SERPL-CCNC: 134 U/L — HIGH (ref 40–120)
ALP SERPL-CCNC: 134 U/L — HIGH (ref 40–120)
ALT FLD-CCNC: 36 U/L — SIGNIFICANT CHANGE UP (ref 10–45)
ALT FLD-CCNC: 36 U/L — SIGNIFICANT CHANGE UP (ref 10–45)
ANION GAP SERPL CALC-SCNC: 12 MMOL/L — SIGNIFICANT CHANGE UP (ref 5–17)
ANION GAP SERPL CALC-SCNC: 12 MMOL/L — SIGNIFICANT CHANGE UP (ref 5–17)
AST SERPL-CCNC: 70 U/L — HIGH (ref 10–40)
AST SERPL-CCNC: 70 U/L — HIGH (ref 10–40)
BILIRUB SERPL-MCNC: 0.6 MG/DL — SIGNIFICANT CHANGE UP (ref 0.2–1.2)
BILIRUB SERPL-MCNC: 0.6 MG/DL — SIGNIFICANT CHANGE UP (ref 0.2–1.2)
BUN SERPL-MCNC: 17 MG/DL — SIGNIFICANT CHANGE UP (ref 7–23)
BUN SERPL-MCNC: 17 MG/DL — SIGNIFICANT CHANGE UP (ref 7–23)
CALCIUM SERPL-MCNC: 9.6 MG/DL — SIGNIFICANT CHANGE UP (ref 8.4–10.5)
CALCIUM SERPL-MCNC: 9.6 MG/DL — SIGNIFICANT CHANGE UP (ref 8.4–10.5)
CHLORIDE SERPL-SCNC: 98 MMOL/L — SIGNIFICANT CHANGE UP (ref 96–108)
CHLORIDE SERPL-SCNC: 98 MMOL/L — SIGNIFICANT CHANGE UP (ref 96–108)
CO2 SERPL-SCNC: 26 MMOL/L — SIGNIFICANT CHANGE UP (ref 22–31)
CO2 SERPL-SCNC: 26 MMOL/L — SIGNIFICANT CHANGE UP (ref 22–31)
CREAT SERPL-MCNC: 1.3 MG/DL — SIGNIFICANT CHANGE UP (ref 0.5–1.3)
CREAT SERPL-MCNC: 1.3 MG/DL — SIGNIFICANT CHANGE UP (ref 0.5–1.3)
EGFR: 43 ML/MIN/1.73M2 — LOW
EGFR: 43 ML/MIN/1.73M2 — LOW
GLUCOSE SERPL-MCNC: 206 MG/DL — HIGH (ref 70–99)
GLUCOSE SERPL-MCNC: 206 MG/DL — HIGH (ref 70–99)
HCT VFR BLD CALC: 33.5 % — LOW (ref 34.5–45)
HCT VFR BLD CALC: 33.5 % — LOW (ref 34.5–45)
HGB BLD-MCNC: 10.4 G/DL — LOW (ref 11.5–15.5)
HGB BLD-MCNC: 10.4 G/DL — LOW (ref 11.5–15.5)
LYMPHOCYTES # BLD AUTO: 0.7 K/UL — LOW (ref 1–3.3)
LYMPHOCYTES # BLD AUTO: 0.7 K/UL — LOW (ref 1–3.3)
LYMPHOCYTES # BLD AUTO: 10.5 % — LOW (ref 13–44)
LYMPHOCYTES # BLD AUTO: 10.5 % — LOW (ref 13–44)
MCHC RBC-ENTMCNC: 29.5 PG — SIGNIFICANT CHANGE UP (ref 27–34)
MCHC RBC-ENTMCNC: 29.5 PG — SIGNIFICANT CHANGE UP (ref 27–34)
MCHC RBC-ENTMCNC: 31 GM/DL — LOW (ref 32–36)
MCHC RBC-ENTMCNC: 31 GM/DL — LOW (ref 32–36)
MCV RBC AUTO: 94.9 FL — SIGNIFICANT CHANGE UP (ref 80–100)
MCV RBC AUTO: 94.9 FL — SIGNIFICANT CHANGE UP (ref 80–100)
NEUTROPHILS # BLD AUTO: 5.7 K/UL — SIGNIFICANT CHANGE UP (ref 1.8–7.4)
NEUTROPHILS # BLD AUTO: 5.7 K/UL — SIGNIFICANT CHANGE UP (ref 1.8–7.4)
NEUTROPHILS NFR BLD AUTO: 81.1 % — HIGH (ref 43–77)
NEUTROPHILS NFR BLD AUTO: 81.1 % — HIGH (ref 43–77)
PLATELET # BLD AUTO: 172 K/UL — SIGNIFICANT CHANGE UP (ref 150–400)
PLATELET # BLD AUTO: 172 K/UL — SIGNIFICANT CHANGE UP (ref 150–400)
POTASSIUM SERPL-MCNC: 3.6 MMOL/L — SIGNIFICANT CHANGE UP (ref 3.5–5.3)
POTASSIUM SERPL-MCNC: 3.6 MMOL/L — SIGNIFICANT CHANGE UP (ref 3.5–5.3)
POTASSIUM SERPL-SCNC: 3.6 MMOL/L — SIGNIFICANT CHANGE UP (ref 3.5–5.3)
POTASSIUM SERPL-SCNC: 3.6 MMOL/L — SIGNIFICANT CHANGE UP (ref 3.5–5.3)
PROT SERPL-MCNC: 7.2 G/DL — SIGNIFICANT CHANGE UP (ref 6–8.3)
PROT SERPL-MCNC: 7.2 G/DL — SIGNIFICANT CHANGE UP (ref 6–8.3)
RBC # BLD: 3.53 M/UL — LOW (ref 3.8–5.2)
RBC # BLD: 3.53 M/UL — LOW (ref 3.8–5.2)
RBC # FLD: 15.5 % — HIGH (ref 10.3–14.5)
RBC # FLD: 15.5 % — HIGH (ref 10.3–14.5)
SODIUM SERPL-SCNC: 136 MMOL/L — SIGNIFICANT CHANGE UP (ref 135–145)
SODIUM SERPL-SCNC: 136 MMOL/L — SIGNIFICANT CHANGE UP (ref 135–145)
TSH SERPL-MCNC: 0.4 UIU/ML — SIGNIFICANT CHANGE UP (ref 0.27–4.2)
TSH SERPL-MCNC: 0.4 UIU/ML — SIGNIFICANT CHANGE UP (ref 0.27–4.2)
WBC # BLD: 7 K/UL — SIGNIFICANT CHANGE UP (ref 3.8–10.5)
WBC # BLD: 7 K/UL — SIGNIFICANT CHANGE UP (ref 3.8–10.5)
WBC # FLD AUTO: 7 K/UL — SIGNIFICANT CHANGE UP (ref 3.8–10.5)
WBC # FLD AUTO: 7 K/UL — SIGNIFICANT CHANGE UP (ref 3.8–10.5)

## 2023-10-26 PROCEDURE — 96375 TX/PRO/DX INJ NEW DRUG ADDON: CPT

## 2023-10-26 PROCEDURE — 85025 COMPLETE CBC W/AUTO DIFF WBC: CPT

## 2023-10-26 PROCEDURE — 99215 OFFICE O/P EST HI 40 MIN: CPT

## 2023-10-26 PROCEDURE — 84443 ASSAY THYROID STIM HORMONE: CPT

## 2023-10-26 PROCEDURE — 36415 COLL VENOUS BLD VENIPUNCTURE: CPT

## 2023-10-26 PROCEDURE — 96367 TX/PROPH/DG ADDL SEQ IV INF: CPT

## 2023-10-26 PROCEDURE — 96413 CHEMO IV INFUSION 1 HR: CPT

## 2023-10-26 PROCEDURE — 80053 COMPREHEN METABOLIC PANEL: CPT

## 2023-10-26 PROCEDURE — 96415 CHEMO IV INFUSION ADDL HR: CPT

## 2023-10-26 PROCEDURE — 96417 CHEMO IV INFUS EACH ADDL SEQ: CPT

## 2023-10-26 RX ORDER — FAMOTIDINE 10 MG/ML
20 INJECTION INTRAVENOUS ONCE
Refills: 0 | Status: COMPLETED | OUTPATIENT
Start: 2023-10-26 | End: 2023-10-26

## 2023-10-26 RX ORDER — DIPHENHYDRAMINE HCL 50 MG
25 CAPSULE ORAL ONCE
Refills: 0 | Status: COMPLETED | OUTPATIENT
Start: 2023-10-26 | End: 2023-10-26

## 2023-10-26 RX ORDER — DEXAMETHASONE 0.5 MG/5ML
10 ELIXIR ORAL ONCE
Refills: 0 | Status: COMPLETED | OUTPATIENT
Start: 2023-10-26 | End: 2023-10-26

## 2023-10-26 RX ORDER — PACLITAXEL 6 MG/ML
250 INJECTION, SOLUTION, CONCENTRATE INTRAVENOUS ONCE
Refills: 0 | Status: COMPLETED | OUTPATIENT
Start: 2023-10-26 | End: 2023-10-26

## 2023-10-26 RX ORDER — PALONOSETRON HYDROCHLORIDE 0.25 MG/5ML
0.25 INJECTION, SOLUTION INTRAVENOUS ONCE
Refills: 0 | Status: COMPLETED | OUTPATIENT
Start: 2023-10-26 | End: 2023-10-26

## 2023-10-26 RX ORDER — PEMBROLIZUMAB 25 MG/ML
200 INJECTION, SOLUTION INTRAVENOUS ONCE
Refills: 0 | Status: COMPLETED | OUTPATIENT
Start: 2023-10-26 | End: 2023-10-26

## 2023-10-26 RX ORDER — DEXAMETHASONE 4 MG/1
4 TABLET ORAL
Qty: 10 | Refills: 6 | Status: ACTIVE | COMMUNITY
Start: 2023-10-19 | End: 1900-01-01

## 2023-10-26 RX ORDER — FOSAPREPITANT DIMEGLUMINE 150 MG/5ML
150 INJECTION, POWDER, LYOPHILIZED, FOR SOLUTION INTRAVENOUS ONCE
Refills: 0 | Status: COMPLETED | OUTPATIENT
Start: 2023-10-26 | End: 2023-10-26

## 2023-10-26 RX ADMIN — Medication 330 MILLIGRAM(S): at 17:30

## 2023-10-26 RX ADMIN — PACLITAXEL 250 MILLIGRAM(S): 6 INJECTION, SOLUTION, CONCENTRATE INTRAVENOUS at 13:40

## 2023-10-26 RX ADMIN — FOSAPREPITANT DIMEGLUMINE 150 MILLIGRAM(S): 150 INJECTION, POWDER, LYOPHILIZED, FOR SOLUTION INTRAVENOUS at 13:00

## 2023-10-26 RX ADMIN — PEMBROLIZUMAB 200 MILLIGRAM(S): 25 INJECTION, SOLUTION INTRAVENOUS at 11:21

## 2023-10-26 RX ADMIN — Medication 330 MILLIGRAM(S): at 18:00

## 2023-10-26 RX ADMIN — Medication 25 MILLIGRAM(S): at 13:15

## 2023-10-26 RX ADMIN — Medication 10 MILLIGRAM(S): at 12:15

## 2023-10-26 RX ADMIN — PEMBROLIZUMAB 200 MILLIGRAM(S): 25 INJECTION, SOLUTION INTRAVENOUS at 11:51

## 2023-10-26 RX ADMIN — FAMOTIDINE 208 MILLIGRAM(S): 10 INJECTION INTRAVENOUS at 12:16

## 2023-10-26 RX ADMIN — FOSAPREPITANT DIMEGLUMINE 500 MILLIGRAM(S): 150 INJECTION, POWDER, LYOPHILIZED, FOR SOLUTION INTRAVENOUS at 12:29

## 2023-10-26 RX ADMIN — FAMOTIDINE 20 MILLIGRAM(S): 10 INJECTION INTRAVENOUS at 12:30

## 2023-10-26 RX ADMIN — Medication 204 MILLIGRAM(S): at 12:00

## 2023-10-26 RX ADMIN — PACLITAXEL 250 MILLIGRAM(S): 6 INJECTION, SOLUTION, CONCENTRATE INTRAVENOUS at 16:50

## 2023-10-26 RX ADMIN — Medication 202 MILLIGRAM(S): at 13:01

## 2023-10-26 RX ADMIN — PALONOSETRON HYDROCHLORIDE 0.25 MILLIGRAM(S): 0.25 INJECTION, SOLUTION INTRAVENOUS at 13:20

## 2023-10-26 NOTE — DISCHARGE INSTRUCTIONS: CHEMOTHERAPY - DC SYMPTOM 3
Kulwinder Howard is a 22 year old male presenting to the walk-in clinic today alone for STD screening. Patient states his partner told him to get tested but he does not know what STD she had.    Treatment tried prior to visit: None    Swabs/Specimens collected during rooming process:  None    Work, School or  note needed: No    New vs Established: New    Patient would like communication of their results via:        Cell Phone:   Telephone Information:   Mobile [phone]     Okay to leave a message containing results? Yes     Signs of bleeding (nose bleeds, bleeding gums, blackened stool, unusual bruising)

## 2023-10-30 ENCOUNTER — APPOINTMENT (OUTPATIENT)
Dept: HOME HEALTH SERVICES | Facility: HOME HEALTH | Age: 73
End: 2023-10-30
Payer: MEDICARE

## 2023-10-30 VITALS
OXYGEN SATURATION: 96 % | SYSTOLIC BLOOD PRESSURE: 126 MMHG | HEART RATE: 74 BPM | RESPIRATION RATE: 20 BRPM | TEMPERATURE: 98.4 F | HEIGHT: 63 IN | DIASTOLIC BLOOD PRESSURE: 64 MMHG

## 2023-10-30 DIAGNOSIS — Z91.81 HISTORY OF FALLING: ICD-10-CM

## 2023-10-30 PROCEDURE — 99344 HOME/RES VST NEW MOD MDM 60: CPT | Mod: 25

## 2023-10-30 PROCEDURE — 99497 ADVNCD CARE PLAN 30 MIN: CPT

## 2023-10-30 PROCEDURE — G0506: CPT

## 2023-10-30 RX ORDER — FERROUS SULFATE TAB EC 324 MG (65 MG FE EQUIVALENT) 324 (65 FE) MG
324 (65 FE) TABLET DELAYED RESPONSE ORAL
Qty: 30 | Refills: 0 | Status: ACTIVE | COMMUNITY
Start: 2023-10-30

## 2023-10-31 ENCOUNTER — LABORATORY RESULT (OUTPATIENT)
Age: 73
End: 2023-10-31

## 2023-10-31 ENCOUNTER — NON-APPOINTMENT (OUTPATIENT)
Age: 73
End: 2023-10-31

## 2023-10-31 ENCOUNTER — APPOINTMENT (OUTPATIENT)
Dept: HEMATOLOGY ONCOLOGY | Facility: CLINIC | Age: 73
End: 2023-10-31
Payer: MEDICARE

## 2023-10-31 VITALS
DIASTOLIC BLOOD PRESSURE: 92 MMHG | WEIGHT: 148 LBS | OXYGEN SATURATION: 96 % | RESPIRATION RATE: 18 BRPM | HEART RATE: 47 BPM | BODY MASS INDEX: 26.22 KG/M2 | SYSTOLIC BLOOD PRESSURE: 158 MMHG | HEIGHT: 63 IN

## 2023-10-31 LAB
ALBUMIN SERPL ELPH-MCNC: 3.8 G/DL
ALP BLD-CCNC: 125 U/L
ALT SERPL-CCNC: 19 U/L
ANION GAP SERPL CALC-SCNC: 9 MMOL/L
AST SERPL-CCNC: 23 U/L
BILIRUB SERPL-MCNC: 0.8 MG/DL
BUN SERPL-MCNC: 18 MG/DL
CALCIUM SERPL-MCNC: 9.5 MG/DL
CHLORIDE SERPL-SCNC: 103 MMOL/L
CO2 SERPL-SCNC: 24 MMOL/L
CREAT SERPL-MCNC: 1.2 MG/DL
EGFR: 48 ML/MIN/1.73M2
GLUCOSE SERPL-MCNC: 207 MG/DL
POTASSIUM SERPL-SCNC: 4.2 MMOL/L
PROT SERPL-MCNC: 7.2 G/DL
SODIUM SERPL-SCNC: 136 MMOL/L

## 2023-10-31 PROCEDURE — 99215 OFFICE O/P EST HI 40 MIN: CPT

## 2023-11-01 ENCOUNTER — APPOINTMENT (OUTPATIENT)
Dept: INTERNAL MEDICINE | Facility: CLINIC | Age: 73
End: 2023-11-01
Payer: MEDICARE

## 2023-11-01 ENCOUNTER — NON-APPOINTMENT (OUTPATIENT)
Age: 73
End: 2023-11-01

## 2023-11-01 VITALS
HEIGHT: 63 IN | HEART RATE: 64 BPM | OXYGEN SATURATION: 98 % | TEMPERATURE: 98.3 F | SYSTOLIC BLOOD PRESSURE: 129 MMHG | WEIGHT: 145 LBS | BODY MASS INDEX: 25.69 KG/M2 | DIASTOLIC BLOOD PRESSURE: 76 MMHG

## 2023-11-01 DIAGNOSIS — M19.90 UNSPECIFIED OSTEOARTHRITIS, UNSPECIFIED SITE: ICD-10-CM

## 2023-11-01 PROCEDURE — 99214 OFFICE O/P EST MOD 30 MIN: CPT | Mod: 25

## 2023-11-01 RX ORDER — INSULIN GLARGINE 100 [IU]/ML
100 INJECTION, SOLUTION SUBCUTANEOUS AT BEDTIME
Qty: 3 | Refills: 3 | Status: DISCONTINUED | COMMUNITY
Start: 2023-10-30 | End: 2023-11-01

## 2023-11-01 RX ORDER — HYDRALAZINE HYDROCHLORIDE 25 MG/1
25 TABLET ORAL TWICE DAILY
Refills: 0 | Status: DISCONTINUED | COMMUNITY
Start: 2021-11-08 | End: 2023-11-01

## 2023-11-01 RX ORDER — INSULIN ASPART 100 [IU]/ML
100 INJECTION, SOLUTION INTRAVENOUS; SUBCUTANEOUS
Refills: 0 | Status: DISCONTINUED | COMMUNITY
Start: 2022-09-27 | End: 2023-11-01

## 2023-11-03 LAB
MAGNESIUM SERPL-MCNC: 1.6 MG/DL
TSH SERPL-ACNC: 0.86 UIU/ML

## 2023-11-08 NOTE — PROGRESS NOTE ADULT - PROBLEM SELECTOR PLAN 4
Standing/Walking/Toileting baseline Cr 2, currently 1.01, voiding     - Trend BUN/Cr  - Avoid nephrotoxic meds

## 2023-11-12 ENCOUNTER — NON-APPOINTMENT (OUTPATIENT)
Age: 73
End: 2023-11-12

## 2023-11-13 LAB
ALBUMIN SERPL ELPH-MCNC: 4.2 G/DL
ALP BLD-CCNC: 84 U/L
ALT SERPL-CCNC: 14 U/L
ANION GAP SERPL CALC-SCNC: 14 MMOL/L
AST SERPL-CCNC: 18 U/L
BASOPHILS # BLD AUTO: 0.02 K/UL
BASOPHILS NFR BLD AUTO: 0.4 %
BILIRUB SERPL-MCNC: 0.6 MG/DL
BUN SERPL-MCNC: 21 MG/DL
CALCIUM SERPL-MCNC: 9.5 MG/DL
CHLORIDE SERPL-SCNC: 99 MMOL/L
CO2 SERPL-SCNC: 19 MMOL/L
CREAT SERPL-MCNC: 1.08 MG/DL
EGFR: 54 ML/MIN/1.73M2
EOSINOPHIL # BLD AUTO: 0.01 K/UL
EOSINOPHIL NFR BLD AUTO: 0.2 %
ESTIMATED AVERAGE GLUCOSE: 197 MG/DL
GLUCOSE SERPL-MCNC: 333 MG/DL
HBA1C MFR BLD HPLC: 8.5 %
HCT VFR BLD CALC: 34.6 %
HGB BLD-MCNC: 10.7 G/DL
IMM GRANULOCYTES NFR BLD AUTO: 0.8 %
LYMPHOCYTES # BLD AUTO: 0.73 K/UL
LYMPHOCYTES NFR BLD AUTO: 13.9 %
MAN DIFF?: NORMAL
MCHC RBC-ENTMCNC: 29.3 PG
MCHC RBC-ENTMCNC: 30.9 GM/DL
MCV RBC AUTO: 94.8 FL
MONOCYTES # BLD AUTO: 0.11 K/UL
MONOCYTES NFR BLD AUTO: 2.1 %
NEUTROPHILS # BLD AUTO: 4.35 K/UL
NEUTROPHILS NFR BLD AUTO: 82.6 %
PLATELET # BLD AUTO: 137 K/UL
POTASSIUM SERPL-SCNC: 4.3 MMOL/L
PROT SERPL-MCNC: 6.6 G/DL
RBC # BLD: 3.65 M/UL
RBC # FLD: 15.7 %
SODIUM SERPL-SCNC: 132 MMOL/L
WBC # FLD AUTO: 5.26 K/UL

## 2023-11-13 PROCEDURE — 97116 GAIT TRAINING THERAPY: CPT

## 2023-11-13 PROCEDURE — 97110 THERAPEUTIC EXERCISES: CPT

## 2023-11-13 PROCEDURE — 82962 GLUCOSE BLOOD TEST: CPT

## 2023-11-13 PROCEDURE — 80048 BASIC METABOLIC PNL TOTAL CA: CPT

## 2023-11-13 PROCEDURE — 36415 COLL VENOUS BLD VENIPUNCTURE: CPT

## 2023-11-13 PROCEDURE — 85652 RBC SED RATE AUTOMATED: CPT

## 2023-11-13 PROCEDURE — 70553 MRI BRAIN STEM W/O & W/DYE: CPT

## 2023-11-13 PROCEDURE — 99285 EMERGENCY DEPT VISIT HI MDM: CPT | Mod: 25

## 2023-11-13 PROCEDURE — 96374 THER/PROPH/DIAG INJ IV PUSH: CPT

## 2023-11-13 PROCEDURE — 83735 ASSAY OF MAGNESIUM: CPT

## 2023-11-13 PROCEDURE — A9585: CPT

## 2023-11-13 PROCEDURE — 85025 COMPLETE CBC W/AUTO DIFF WBC: CPT

## 2023-11-13 PROCEDURE — 80053 COMPREHEN METABOLIC PANEL: CPT

## 2023-11-13 PROCEDURE — 83036 HEMOGLOBIN GLYCOSYLATED A1C: CPT

## 2023-11-13 PROCEDURE — 84100 ASSAY OF PHOSPHORUS: CPT

## 2023-11-13 PROCEDURE — 85027 COMPLETE CBC AUTOMATED: CPT

## 2023-11-13 PROCEDURE — 83935 ASSAY OF URINE OSMOLALITY: CPT

## 2023-11-13 PROCEDURE — 93005 ELECTROCARDIOGRAM TRACING: CPT

## 2023-11-13 PROCEDURE — 97530 THERAPEUTIC ACTIVITIES: CPT

## 2023-11-13 PROCEDURE — 97535 SELF CARE MNGMENT TRAINING: CPT

## 2023-11-13 PROCEDURE — 86140 C-REACTIVE PROTEIN: CPT

## 2023-11-13 PROCEDURE — 97162 PT EVAL MOD COMPLEX 30 MIN: CPT

## 2023-11-13 PROCEDURE — 97165 OT EVAL LOW COMPLEX 30 MIN: CPT

## 2023-11-13 PROCEDURE — 84300 ASSAY OF URINE SODIUM: CPT

## 2023-11-16 ENCOUNTER — APPOINTMENT (OUTPATIENT)
Dept: HEMATOLOGY ONCOLOGY | Facility: CLINIC | Age: 73
End: 2023-11-16
Payer: MEDICARE

## 2023-11-16 ENCOUNTER — OUTPATIENT (OUTPATIENT)
Dept: OUTPATIENT SERVICES | Facility: HOSPITAL | Age: 73
LOS: 1 days | End: 2023-11-16
Payer: MEDICARE

## 2023-11-16 ENCOUNTER — APPOINTMENT (OUTPATIENT)
Dept: INFUSION THERAPY | Facility: CLINIC | Age: 73
End: 2023-11-16

## 2023-11-16 ENCOUNTER — EMERGENCY (EMERGENCY)
Facility: HOSPITAL | Age: 73
LOS: 1 days | Discharge: HOME | End: 2023-11-16
Attending: EMERGENCY MEDICINE | Admitting: EMERGENCY MEDICINE
Payer: MEDICARE

## 2023-11-16 VITALS
DIASTOLIC BLOOD PRESSURE: 72 MMHG | WEIGHT: 147.05 LBS | RESPIRATION RATE: 18 BRPM | OXYGEN SATURATION: 99 % | TEMPERATURE: 98 F | SYSTOLIC BLOOD PRESSURE: 126 MMHG | HEART RATE: 81 BPM | HEIGHT: 63 IN

## 2023-11-16 VITALS
TEMPERATURE: 98 F | RESPIRATION RATE: 16 BRPM | HEART RATE: 60 BPM | SYSTOLIC BLOOD PRESSURE: 153 MMHG | DIASTOLIC BLOOD PRESSURE: 75 MMHG | OXYGEN SATURATION: 98 %

## 2023-11-16 VITALS
DIASTOLIC BLOOD PRESSURE: 83 MMHG | HEART RATE: 79 BPM | TEMPERATURE: 98 F | WEIGHT: 147.05 LBS | OXYGEN SATURATION: 98 % | RESPIRATION RATE: 17 BRPM | SYSTOLIC BLOOD PRESSURE: 129 MMHG | HEIGHT: 63 IN

## 2023-11-16 DIAGNOSIS — M06.9 RHEUMATOID ARTHRITIS, UNSPECIFIED: ICD-10-CM

## 2023-11-16 DIAGNOSIS — Z88.1 ALLERGY STATUS TO OTHER ANTIBIOTIC AGENTS STATUS: ICD-10-CM

## 2023-11-16 DIAGNOSIS — I12.9 HYPERTENSIVE CHRONIC KIDNEY DISEASE WITH STAGE 1 THROUGH STAGE 4 CHRONIC KIDNEY DISEASE, OR UNSPECIFIED CHRONIC KIDNEY DISEASE: ICD-10-CM

## 2023-11-16 DIAGNOSIS — Z87.891 PERSONAL HISTORY OF NICOTINE DEPENDENCE: ICD-10-CM

## 2023-11-16 DIAGNOSIS — C79.31 SECONDARY MALIGNANT NEOPLASM OF BRAIN: ICD-10-CM

## 2023-11-16 DIAGNOSIS — Z96.641 PRESENCE OF RIGHT ARTIFICIAL HIP JOINT: Chronic | ICD-10-CM

## 2023-11-16 DIAGNOSIS — Z79.4 LONG TERM (CURRENT) USE OF INSULIN: ICD-10-CM

## 2023-11-16 DIAGNOSIS — N18.9 CHRONIC KIDNEY DISEASE, UNSPECIFIED: ICD-10-CM

## 2023-11-16 DIAGNOSIS — Z92.3 PERSONAL HISTORY OF IRRADIATION: ICD-10-CM

## 2023-11-16 DIAGNOSIS — C34.90 MALIGNANT NEOPLASM OF UNSPECIFIED PART OF UNSPECIFIED BRONCHUS OR LUNG: ICD-10-CM

## 2023-11-16 DIAGNOSIS — E11.65 TYPE 2 DIABETES MELLITUS WITH HYPERGLYCEMIA: ICD-10-CM

## 2023-11-16 DIAGNOSIS — E11.22 TYPE 2 DIABETES MELLITUS WITH DIABETIC CHRONIC KIDNEY DISEASE: ICD-10-CM

## 2023-11-16 DIAGNOSIS — Z79.84 LONG TERM (CURRENT) USE OF ORAL HYPOGLYCEMIC DRUGS: ICD-10-CM

## 2023-11-16 DIAGNOSIS — N39.0 URINARY TRACT INFECTION, SITE NOT SPECIFIED: ICD-10-CM

## 2023-11-16 DIAGNOSIS — E78.5 HYPERLIPIDEMIA, UNSPECIFIED: ICD-10-CM

## 2023-11-16 DIAGNOSIS — T38.3X6A UNDERDOSING OF INSULIN AND ORAL HYPOGLYCEMIC [ANTIDIABETIC] DRUGS, INITIAL ENCOUNTER: ICD-10-CM

## 2023-11-16 DIAGNOSIS — Z96.641 PRESENCE OF RIGHT ARTIFICIAL HIP JOINT: ICD-10-CM

## 2023-11-16 DIAGNOSIS — Z91.148 PATIENT'S OTHER NONCOMPLIANCE WITH MEDICATION REGIMEN FOR OTHER REASON: ICD-10-CM

## 2023-11-16 LAB
ALBUMIN SERPL ELPH-MCNC: 3.6 G/DL — SIGNIFICANT CHANGE UP (ref 3.3–5)
ALBUMIN SERPL ELPH-MCNC: 3.6 G/DL — SIGNIFICANT CHANGE UP (ref 3.3–5)
ALBUMIN SERPL ELPH-MCNC: 4.2 G/DL — SIGNIFICANT CHANGE UP (ref 3.3–5)
ALBUMIN SERPL ELPH-MCNC: 4.2 G/DL — SIGNIFICANT CHANGE UP (ref 3.3–5)
ALP SERPL-CCNC: 141 U/L — HIGH (ref 40–120)
ALP SERPL-CCNC: 141 U/L — HIGH (ref 40–120)
ALP SERPL-CCNC: 152 U/L — HIGH (ref 40–120)
ALP SERPL-CCNC: 152 U/L — HIGH (ref 40–120)
ALT FLD-CCNC: 50 U/L — HIGH (ref 10–45)
ALT FLD-CCNC: 50 U/L — HIGH (ref 10–45)
ALT FLD-CCNC: 51 U/L — HIGH (ref 10–45)
ALT FLD-CCNC: 51 U/L — HIGH (ref 10–45)
ANION GAP SERPL CALC-SCNC: 7 MMOL/L — SIGNIFICANT CHANGE UP (ref 5–17)
ANION GAP SERPL CALC-SCNC: 7 MMOL/L — SIGNIFICANT CHANGE UP (ref 5–17)
ANION GAP SERPL CALC-SCNC: 9 MMOL/L — SIGNIFICANT CHANGE UP (ref 5–17)
ANION GAP SERPL CALC-SCNC: 9 MMOL/L — SIGNIFICANT CHANGE UP (ref 5–17)
APPEARANCE UR: CLEAR — SIGNIFICANT CHANGE UP
APPEARANCE UR: CLEAR — SIGNIFICANT CHANGE UP
AST SERPL-CCNC: 47 U/L — HIGH (ref 10–40)
AST SERPL-CCNC: 47 U/L — HIGH (ref 10–40)
AST SERPL-CCNC: 50 U/L — HIGH (ref 10–40)
AST SERPL-CCNC: 50 U/L — HIGH (ref 10–40)
B-OH-BUTYR SERPL-SCNC: 0.2 MMOL/L — SIGNIFICANT CHANGE UP
B-OH-BUTYR SERPL-SCNC: 0.2 MMOL/L — SIGNIFICANT CHANGE UP
BACTERIA # UR AUTO: ABNORMAL /HPF
BACTERIA # UR AUTO: ABNORMAL /HPF
BASE EXCESS BLDV CALC-SCNC: 0.8 MMOL/L — SIGNIFICANT CHANGE UP (ref -2–3)
BASE EXCESS BLDV CALC-SCNC: 0.8 MMOL/L — SIGNIFICANT CHANGE UP (ref -2–3)
BASOPHILS # BLD AUTO: 0.01 K/UL — SIGNIFICANT CHANGE UP (ref 0–0.2)
BASOPHILS # BLD AUTO: 0.01 K/UL — SIGNIFICANT CHANGE UP (ref 0–0.2)
BASOPHILS NFR BLD AUTO: 0.1 % — SIGNIFICANT CHANGE UP (ref 0–2)
BASOPHILS NFR BLD AUTO: 0.1 % — SIGNIFICANT CHANGE UP (ref 0–2)
BILIRUB SERPL-MCNC: 0.3 MG/DL — SIGNIFICANT CHANGE UP (ref 0.2–1.2)
BILIRUB SERPL-MCNC: 0.3 MG/DL — SIGNIFICANT CHANGE UP (ref 0.2–1.2)
BILIRUB SERPL-MCNC: 0.7 MG/DL — SIGNIFICANT CHANGE UP (ref 0.2–1.2)
BILIRUB SERPL-MCNC: 0.7 MG/DL — SIGNIFICANT CHANGE UP (ref 0.2–1.2)
BILIRUB UR-MCNC: NEGATIVE — SIGNIFICANT CHANGE UP
BILIRUB UR-MCNC: NEGATIVE — SIGNIFICANT CHANGE UP
BUN SERPL-MCNC: 13 MG/DL — SIGNIFICANT CHANGE UP (ref 7–23)
BUN SERPL-MCNC: 13 MG/DL — SIGNIFICANT CHANGE UP (ref 7–23)
BUN SERPL-MCNC: 14 MG/DL — SIGNIFICANT CHANGE UP (ref 7–23)
BUN SERPL-MCNC: 14 MG/DL — SIGNIFICANT CHANGE UP (ref 7–23)
CA-I SERPL-SCNC: 1.32 MMOL/L — SIGNIFICANT CHANGE UP (ref 1.15–1.33)
CA-I SERPL-SCNC: 1.32 MMOL/L — SIGNIFICANT CHANGE UP (ref 1.15–1.33)
CALCIUM SERPL-MCNC: 10.1 MG/DL — SIGNIFICANT CHANGE UP (ref 8.4–10.5)
CALCIUM SERPL-MCNC: 10.1 MG/DL — SIGNIFICANT CHANGE UP (ref 8.4–10.5)
CALCIUM SERPL-MCNC: 10.3 MG/DL — SIGNIFICANT CHANGE UP (ref 8.4–10.5)
CALCIUM SERPL-MCNC: 10.3 MG/DL — SIGNIFICANT CHANGE UP (ref 8.4–10.5)
CAST: 1 /LPF — SIGNIFICANT CHANGE UP (ref 0–4)
CAST: 1 /LPF — SIGNIFICANT CHANGE UP (ref 0–4)
CHLORIDE SERPL-SCNC: 100 MMOL/L — SIGNIFICANT CHANGE UP (ref 96–108)
CHLORIDE SERPL-SCNC: 100 MMOL/L — SIGNIFICANT CHANGE UP (ref 96–108)
CHLORIDE SERPL-SCNC: 95 MMOL/L — LOW (ref 96–108)
CHLORIDE SERPL-SCNC: 95 MMOL/L — LOW (ref 96–108)
CO2 BLDV-SCNC: 28.6 MMOL/L — HIGH (ref 22–26)
CO2 BLDV-SCNC: 28.6 MMOL/L — HIGH (ref 22–26)
CO2 SERPL-SCNC: 25 MMOL/L — SIGNIFICANT CHANGE UP (ref 22–31)
CO2 SERPL-SCNC: 25 MMOL/L — SIGNIFICANT CHANGE UP (ref 22–31)
CO2 SERPL-SCNC: 26 MMOL/L — SIGNIFICANT CHANGE UP (ref 22–31)
CO2 SERPL-SCNC: 26 MMOL/L — SIGNIFICANT CHANGE UP (ref 22–31)
COLOR SPEC: YELLOW — SIGNIFICANT CHANGE UP
COLOR SPEC: YELLOW — SIGNIFICANT CHANGE UP
CREAT SERPL-MCNC: 1.2 MG/DL — SIGNIFICANT CHANGE UP (ref 0.5–1.3)
CREAT SERPL-MCNC: 1.2 MG/DL — SIGNIFICANT CHANGE UP (ref 0.5–1.3)
CREAT SERPL-MCNC: 1.36 MG/DL — HIGH (ref 0.5–1.3)
CREAT SERPL-MCNC: 1.36 MG/DL — HIGH (ref 0.5–1.3)
DIFF PNL FLD: NEGATIVE — SIGNIFICANT CHANGE UP
DIFF PNL FLD: NEGATIVE — SIGNIFICANT CHANGE UP
EGFR: 41 ML/MIN/1.73M2 — LOW
EGFR: 41 ML/MIN/1.73M2 — LOW
EGFR: 48 ML/MIN/1.73M2 — LOW
EGFR: 48 ML/MIN/1.73M2 — LOW
EOSINOPHIL # BLD AUTO: 0 K/UL — SIGNIFICANT CHANGE UP (ref 0–0.5)
EOSINOPHIL # BLD AUTO: 0 K/UL — SIGNIFICANT CHANGE UP (ref 0–0.5)
EOSINOPHIL NFR BLD AUTO: 0 % — SIGNIFICANT CHANGE UP (ref 0–6)
EOSINOPHIL NFR BLD AUTO: 0 % — SIGNIFICANT CHANGE UP (ref 0–6)
GAS PNL BLDV: 132 MMOL/L — LOW (ref 136–145)
GAS PNL BLDV: 132 MMOL/L — LOW (ref 136–145)
GAS PNL BLDV: SIGNIFICANT CHANGE UP
GAS PNL BLDV: SIGNIFICANT CHANGE UP
GLUCOSE SERPL-MCNC: 380 MG/DL — HIGH (ref 70–99)
GLUCOSE SERPL-MCNC: 380 MG/DL — HIGH (ref 70–99)
GLUCOSE SERPL-MCNC: 478 MG/DL — CRITICAL HIGH (ref 70–99)
GLUCOSE SERPL-MCNC: 478 MG/DL — CRITICAL HIGH (ref 70–99)
GLUCOSE UR QL: >=1000 MG/DL
GLUCOSE UR QL: >=1000 MG/DL
HCO3 BLDV-SCNC: 27 MMOL/L — SIGNIFICANT CHANGE UP (ref 22–29)
HCO3 BLDV-SCNC: 27 MMOL/L — SIGNIFICANT CHANGE UP (ref 22–29)
HCT VFR BLD CALC: 34.7 % — SIGNIFICANT CHANGE UP (ref 34.5–45)
HCT VFR BLD CALC: 34.7 % — SIGNIFICANT CHANGE UP (ref 34.5–45)
HCT VFR BLD CALC: 36.6 % — SIGNIFICANT CHANGE UP (ref 34.5–45)
HCT VFR BLD CALC: 36.6 % — SIGNIFICANT CHANGE UP (ref 34.5–45)
HGB BLD-MCNC: 10.7 G/DL — LOW (ref 11.5–15.5)
HGB BLD-MCNC: 10.7 G/DL — LOW (ref 11.5–15.5)
HGB BLD-MCNC: 11.4 G/DL — LOW (ref 11.5–15.5)
HGB BLD-MCNC: 11.4 G/DL — LOW (ref 11.5–15.5)
IMM GRANULOCYTES NFR BLD AUTO: 0.6 % — SIGNIFICANT CHANGE UP (ref 0–0.9)
IMM GRANULOCYTES NFR BLD AUTO: 0.6 % — SIGNIFICANT CHANGE UP (ref 0–0.9)
KETONES UR-MCNC: NEGATIVE MG/DL — SIGNIFICANT CHANGE UP
KETONES UR-MCNC: NEGATIVE MG/DL — SIGNIFICANT CHANGE UP
LACTATE SERPL-SCNC: 1.6 MMOL/L — SIGNIFICANT CHANGE UP (ref 0.5–2)
LACTATE SERPL-SCNC: 1.6 MMOL/L — SIGNIFICANT CHANGE UP (ref 0.5–2)
LEUKOCYTE ESTERASE UR-ACNC: ABNORMAL
LEUKOCYTE ESTERASE UR-ACNC: ABNORMAL
LYMPHOCYTES # BLD AUTO: 0.7 K/UL — LOW (ref 1–3.3)
LYMPHOCYTES # BLD AUTO: 0.7 K/UL — LOW (ref 1–3.3)
LYMPHOCYTES # BLD AUTO: 0.9 K/UL — LOW (ref 1–3.3)
LYMPHOCYTES # BLD AUTO: 0.9 K/UL — LOW (ref 1–3.3)
LYMPHOCYTES # BLD AUTO: 10.2 % — LOW (ref 13–44)
LYMPHOCYTES # BLD AUTO: 10.2 % — LOW (ref 13–44)
LYMPHOCYTES # BLD AUTO: 9.2 % — LOW (ref 13–44)
LYMPHOCYTES # BLD AUTO: 9.2 % — LOW (ref 13–44)
MAGNESIUM SERPL-MCNC: 1.8 MG/DL — SIGNIFICANT CHANGE UP (ref 1.6–2.6)
MAGNESIUM SERPL-MCNC: 1.8 MG/DL — SIGNIFICANT CHANGE UP (ref 1.6–2.6)
MCHC RBC-ENTMCNC: 29.1 PG — SIGNIFICANT CHANGE UP (ref 27–34)
MCHC RBC-ENTMCNC: 29.1 PG — SIGNIFICANT CHANGE UP (ref 27–34)
MCHC RBC-ENTMCNC: 29.5 PG — SIGNIFICANT CHANGE UP (ref 27–34)
MCHC RBC-ENTMCNC: 29.5 PG — SIGNIFICANT CHANGE UP (ref 27–34)
MCHC RBC-ENTMCNC: 30.8 GM/DL — LOW (ref 32–36)
MCHC RBC-ENTMCNC: 30.8 GM/DL — LOW (ref 32–36)
MCHC RBC-ENTMCNC: 31.1 GM/DL — LOW (ref 32–36)
MCHC RBC-ENTMCNC: 31.1 GM/DL — LOW (ref 32–36)
MCV RBC AUTO: 94.3 FL — SIGNIFICANT CHANGE UP (ref 80–100)
MCV RBC AUTO: 94.3 FL — SIGNIFICANT CHANGE UP (ref 80–100)
MCV RBC AUTO: 94.8 FL — SIGNIFICANT CHANGE UP (ref 80–100)
MCV RBC AUTO: 94.8 FL — SIGNIFICANT CHANGE UP (ref 80–100)
MONOCYTES # BLD AUTO: 0.1 K/UL — SIGNIFICANT CHANGE UP (ref 0–0.9)
MONOCYTES # BLD AUTO: 0.1 K/UL — SIGNIFICANT CHANGE UP (ref 0–0.9)
MONOCYTES NFR BLD AUTO: 1.1 % — LOW (ref 2–14)
MONOCYTES NFR BLD AUTO: 1.1 % — LOW (ref 2–14)
NEUTROPHILS # BLD AUTO: 6.4 K/UL — SIGNIFICANT CHANGE UP (ref 1.8–7.4)
NEUTROPHILS # BLD AUTO: 6.4 K/UL — SIGNIFICANT CHANGE UP (ref 1.8–7.4)
NEUTROPHILS # BLD AUTO: 7.77 K/UL — HIGH (ref 1.8–7.4)
NEUTROPHILS # BLD AUTO: 7.77 K/UL — HIGH (ref 1.8–7.4)
NEUTROPHILS NFR BLD AUTO: 88 % — HIGH (ref 43–77)
NEUTROPHILS NFR BLD AUTO: 88 % — HIGH (ref 43–77)
NEUTROPHILS NFR BLD AUTO: 89.5 % — HIGH (ref 43–77)
NEUTROPHILS NFR BLD AUTO: 89.5 % — HIGH (ref 43–77)
NITRITE UR-MCNC: POSITIVE
NITRITE UR-MCNC: POSITIVE
NRBC # BLD: 0 /100 WBCS — SIGNIFICANT CHANGE UP (ref 0–0)
NRBC # BLD: 0 /100 WBCS — SIGNIFICANT CHANGE UP (ref 0–0)
PCO2 BLDV: 49 MMHG — HIGH (ref 39–42)
PCO2 BLDV: 49 MMHG — HIGH (ref 39–42)
PH BLDV: 7.35 — SIGNIFICANT CHANGE UP (ref 7.32–7.43)
PH BLDV: 7.35 — SIGNIFICANT CHANGE UP (ref 7.32–7.43)
PH UR: 7 — SIGNIFICANT CHANGE UP (ref 5–8)
PH UR: 7 — SIGNIFICANT CHANGE UP (ref 5–8)
PLATELET # BLD AUTO: 171 K/UL — SIGNIFICANT CHANGE UP (ref 150–400)
PLATELET # BLD AUTO: 171 K/UL — SIGNIFICANT CHANGE UP (ref 150–400)
PLATELET # BLD AUTO: 172 K/UL — SIGNIFICANT CHANGE UP (ref 150–400)
PLATELET # BLD AUTO: 172 K/UL — SIGNIFICANT CHANGE UP (ref 150–400)
PO2 BLDV: 33 MMHG — SIGNIFICANT CHANGE UP (ref 25–45)
PO2 BLDV: 33 MMHG — SIGNIFICANT CHANGE UP (ref 25–45)
POTASSIUM BLDV-SCNC: 4.7 MMOL/L — SIGNIFICANT CHANGE UP (ref 3.5–5.1)
POTASSIUM BLDV-SCNC: 4.7 MMOL/L — SIGNIFICANT CHANGE UP (ref 3.5–5.1)
POTASSIUM SERPL-MCNC: 4.6 MMOL/L — SIGNIFICANT CHANGE UP (ref 3.5–5.3)
POTASSIUM SERPL-MCNC: 4.6 MMOL/L — SIGNIFICANT CHANGE UP (ref 3.5–5.3)
POTASSIUM SERPL-MCNC: 5 MMOL/L — SIGNIFICANT CHANGE UP (ref 3.5–5.3)
POTASSIUM SERPL-MCNC: 5 MMOL/L — SIGNIFICANT CHANGE UP (ref 3.5–5.3)
POTASSIUM SERPL-SCNC: 4.6 MMOL/L — SIGNIFICANT CHANGE UP (ref 3.5–5.3)
POTASSIUM SERPL-SCNC: 4.6 MMOL/L — SIGNIFICANT CHANGE UP (ref 3.5–5.3)
POTASSIUM SERPL-SCNC: 5 MMOL/L — SIGNIFICANT CHANGE UP (ref 3.5–5.3)
POTASSIUM SERPL-SCNC: 5 MMOL/L — SIGNIFICANT CHANGE UP (ref 3.5–5.3)
PROT SERPL-MCNC: 6.7 G/DL — SIGNIFICANT CHANGE UP (ref 6–8.3)
PROT SERPL-MCNC: 6.7 G/DL — SIGNIFICANT CHANGE UP (ref 6–8.3)
PROT SERPL-MCNC: 7.2 G/DL — SIGNIFICANT CHANGE UP (ref 6–8.3)
PROT SERPL-MCNC: 7.2 G/DL — SIGNIFICANT CHANGE UP (ref 6–8.3)
PROT UR-MCNC: SIGNIFICANT CHANGE UP MG/DL
PROT UR-MCNC: SIGNIFICANT CHANGE UP MG/DL
RBC # BLD: 3.68 M/UL — LOW (ref 3.8–5.2)
RBC # BLD: 3.68 M/UL — LOW (ref 3.8–5.2)
RBC # BLD: 3.86 M/UL — SIGNIFICANT CHANGE UP (ref 3.8–5.2)
RBC # BLD: 3.86 M/UL — SIGNIFICANT CHANGE UP (ref 3.8–5.2)
RBC # FLD: 15.3 % — HIGH (ref 10.3–14.5)
RBC # FLD: 15.3 % — HIGH (ref 10.3–14.5)
RBC # FLD: 15.6 % — HIGH (ref 10.3–14.5)
RBC # FLD: 15.6 % — HIGH (ref 10.3–14.5)
RBC CASTS # UR COMP ASSIST: 3 /HPF — SIGNIFICANT CHANGE UP (ref 0–4)
RBC CASTS # UR COMP ASSIST: 3 /HPF — SIGNIFICANT CHANGE UP (ref 0–4)
SAO2 % BLDV: 52.4 % — LOW (ref 67–88)
SAO2 % BLDV: 52.4 % — LOW (ref 67–88)
SODIUM SERPL-SCNC: 130 MMOL/L — LOW (ref 135–145)
SODIUM SERPL-SCNC: 130 MMOL/L — LOW (ref 135–145)
SODIUM SERPL-SCNC: 132 MMOL/L — LOW (ref 135–145)
SODIUM SERPL-SCNC: 132 MMOL/L — LOW (ref 135–145)
SP GR SPEC: >1.03 — HIGH (ref 1–1.03)
SP GR SPEC: >1.03 — HIGH (ref 1–1.03)
SQUAMOUS # UR AUTO: 2 /HPF — SIGNIFICANT CHANGE UP (ref 0–5)
SQUAMOUS # UR AUTO: 2 /HPF — SIGNIFICANT CHANGE UP (ref 0–5)
TSH SERPL-MCNC: 0.47 UIU/ML — SIGNIFICANT CHANGE UP (ref 0.27–4.2)
TSH SERPL-MCNC: 0.47 UIU/ML — SIGNIFICANT CHANGE UP (ref 0.27–4.2)
UROBILINOGEN FLD QL: 0.2 MG/DL — SIGNIFICANT CHANGE UP (ref 0.2–1)
UROBILINOGEN FLD QL: 0.2 MG/DL — SIGNIFICANT CHANGE UP (ref 0.2–1)
WBC # BLD: 7.2 K/UL — SIGNIFICANT CHANGE UP (ref 3.8–10.5)
WBC # BLD: 7.2 K/UL — SIGNIFICANT CHANGE UP (ref 3.8–10.5)
WBC # BLD: 8.83 K/UL — SIGNIFICANT CHANGE UP (ref 3.8–10.5)
WBC # BLD: 8.83 K/UL — SIGNIFICANT CHANGE UP (ref 3.8–10.5)
WBC # FLD AUTO: 7.2 K/UL — SIGNIFICANT CHANGE UP (ref 3.8–10.5)
WBC # FLD AUTO: 7.2 K/UL — SIGNIFICANT CHANGE UP (ref 3.8–10.5)
WBC # FLD AUTO: 8.83 K/UL — SIGNIFICANT CHANGE UP (ref 3.8–10.5)
WBC # FLD AUTO: 8.83 K/UL — SIGNIFICANT CHANGE UP (ref 3.8–10.5)
WBC UR QL: 64 /HPF — HIGH (ref 0–5)
WBC UR QL: 64 /HPF — HIGH (ref 0–5)

## 2023-11-16 PROCEDURE — 81001 URINALYSIS AUTO W/SCOPE: CPT

## 2023-11-16 PROCEDURE — 82330 ASSAY OF CALCIUM: CPT

## 2023-11-16 PROCEDURE — 80053 COMPREHEN METABOLIC PANEL: CPT

## 2023-11-16 PROCEDURE — 85025 COMPLETE CBC W/AUTO DIFF WBC: CPT

## 2023-11-16 PROCEDURE — 36415 COLL VENOUS BLD VENIPUNCTURE: CPT

## 2023-11-16 PROCEDURE — 99284 EMERGENCY DEPT VISIT MOD MDM: CPT | Mod: 25

## 2023-11-16 PROCEDURE — 82803 BLOOD GASES ANY COMBINATION: CPT

## 2023-11-16 PROCEDURE — 84443 ASSAY THYROID STIM HORMONE: CPT

## 2023-11-16 PROCEDURE — 83735 ASSAY OF MAGNESIUM: CPT

## 2023-11-16 PROCEDURE — 96374 THER/PROPH/DIAG INJ IV PUSH: CPT

## 2023-11-16 PROCEDURE — 99285 EMERGENCY DEPT VISIT HI MDM: CPT

## 2023-11-16 PROCEDURE — 84132 ASSAY OF SERUM POTASSIUM: CPT

## 2023-11-16 PROCEDURE — 84295 ASSAY OF SERUM SODIUM: CPT

## 2023-11-16 PROCEDURE — 82010 KETONE BODYS QUAN: CPT

## 2023-11-16 PROCEDURE — 87086 URINE CULTURE/COLONY COUNT: CPT

## 2023-11-16 PROCEDURE — 87186 SC STD MICRODIL/AGAR DIL: CPT

## 2023-11-16 PROCEDURE — 83605 ASSAY OF LACTIC ACID: CPT

## 2023-11-16 PROCEDURE — 82962 GLUCOSE BLOOD TEST: CPT

## 2023-11-16 PROCEDURE — 99214 OFFICE O/P EST MOD 30 MIN: CPT | Mod: 25

## 2023-11-16 RX ORDER — CEFPODOXIME PROXETIL 100 MG
1 TABLET ORAL
Qty: 14 | Refills: 0
Start: 2023-11-16 | End: 2023-11-22

## 2023-11-16 RX ORDER — CEFPODOXIME PROXETIL 100 MG
100 TABLET ORAL ONCE
Refills: 0 | Status: COMPLETED | OUTPATIENT
Start: 2023-11-16 | End: 2023-11-16

## 2023-11-16 RX ORDER — INSULIN HUMAN 100 [IU]/ML
6 INJECTION, SOLUTION SUBCUTANEOUS ONCE
Refills: 0 | Status: COMPLETED | OUTPATIENT
Start: 2023-11-16 | End: 2023-11-16

## 2023-11-16 RX ORDER — SODIUM CHLORIDE 9 MG/ML
1000 INJECTION INTRAMUSCULAR; INTRAVENOUS; SUBCUTANEOUS ONCE
Refills: 0 | Status: COMPLETED | OUTPATIENT
Start: 2023-11-16 | End: 2023-11-16

## 2023-11-16 RX ADMIN — INSULIN HUMAN 6 UNIT(S): 100 INJECTION, SOLUTION SUBCUTANEOUS at 14:19

## 2023-11-16 RX ADMIN — SODIUM CHLORIDE 1000 MILLILITER(S): 9 INJECTION INTRAMUSCULAR; INTRAVENOUS; SUBCUTANEOUS at 12:49

## 2023-11-16 RX ADMIN — Medication 100 MILLIGRAM(S): at 14:53

## 2023-11-16 NOTE — ED ADULT NURSE REASSESSMENT NOTE - NS ED NURSE REASSESS COMMENT FT1
Pt refusing to allow RN to deaccess port. Explained that is per hospital policy. MD Ordoñez aware. Pt also not allowing RN to attempt IV. MD Ordoñez to attempt USIV

## 2023-11-16 NOTE — ED ADULT NURSE NOTE - NSFALLUNIVINTERV_ED_ALL_ED
Bed/Stretcher in lowest position, wheels locked, appropriate side rails in place/Call bell, personal items and telephone in reach/Instruct patient to call for assistance before getting out of bed/chair/stretcher/Non-slip footwear applied when patient is off stretcher/[place] to call system/Physically safe environment - no spills, clutter or unnecessary equipment/Purposeful proactive rounding/Room/bathroom lighting operational, light cord in reach

## 2023-11-16 NOTE — ED PROVIDER NOTE - OBJECTIVE STATEMENT
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 Pt received 1L ns at [facility] with persistently elevated glucose.and chemo rescheduled for next week. Pt is denying any complaints at present. No cp, sob, palp, dizziness, weakness, abd pain, n/v/d, polyuria, polydipsia... Pt admits to forgetting to take her insulin last night but did take her Januvia and Farxiga this morning.

## 2023-11-16 NOTE — ED PROVIDER NOTE - CLINICAL SUMMARY MEDICAL DECISION MAKING FREE TEXT BOX
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 Pt received 1L ns at [facility] with persistently elevated glucose.and chemo rescheduled for next week. Pt is denying any complaints at present. No cp, sob, palp, dizziness, weakness, abd pain, n/v/d, polyuria, polydipsia... Pt admits to forgetting to take her insulin last night but did take her Januvia and Farxiga this morning.    Afebrile. HDS. Pt is well appearing. Labs remarkable for elev gluc to 380 with neg ag. Bhb, lactate and vbg wnl. Renal function intact. No leukocytosis. Pt hdyrated with ivf, given insulin, with improvement of glucose to 226. UA pos for infection and pt given dose of abx. ED evaluation and management discussed with the patient in detail.  Close PMD follow up encouraged.  Strict ED return instructions discussed in detail and patient given the opportunity to ask any questions about their discharge diagnosis and instructions. Patient verbalized understanding.

## 2023-11-16 NOTE — ED PROVIDER NOTE - NSFOLLOWUPINSTRUCTIONS_ED_ALL_ED_FT
Make sure to take your medications daily as prescribed.  Take cefpodoxime twice daily for the next week.  Drink plenty of fluids.  Follow up with your primary care physician for re-evaluation.  Return to er for any new or worsening symptoms.     Hyperglycemia  Hyperglycemia is when the sugar (glucose) level in your blood is too high. High blood sugar can happen to people who have or do not have diabetes. High blood sugar can happen quickly. It can be an emergency.    What are the causes?  If you have diabetes, high blood sugar may be caused by:  Medicines that increase blood sugar or affect your control of diabetes.  Getting less physical activity.  Overeating.  Being sick or injured or having an infection.  Having surgery.  Stress.  Not giving yourself enough insulin (if you are taking it).  You may have high blood sugar because you have diabetes that has not been diagnosed yet.    If you do not have diabetes, high blood sugar may be caused by:  Certain medicines.  Stress.  A bad illness.  An infection.  Having surgery.  Diseases of the pancreas.  What increases the risk?  This condition is more likely to develop in people who have risk factors for diabetes, such as:  Having a family member with diabetes.  Certain conditions in which the body's defense system (immune system) attacks itself. These are called autoimmune disorders.  Being overweight.  Not being active.  Having a condition called insulin resistance.  Having a history of:  Prediabetes.  Diabetes when pregnant.  Polycystic ovarian syndrome (PCOS).  What are the signs or symptoms?  This condition may not cause symptoms. If you do have symptoms, they may include:  Feeling more thirsty than normal.  Needing to pee (urinate) more often than normal.  Hunger.  Feeling very tired.  Blurry eyesight (vision).  You may get other symptoms as the condition gets worse, such as:  Dry mouth.  Pain in your belly (abdomen).  Not being hungry (loss of appetite).  Breath that smells fruity.  Weakness.  Weight loss that is not planned.  A tingling or numb feeling in your hands or feet.  A headache.  Cuts or bruises that heal slowly.  How is this treated?  Treatment depends on the cause of your condition. Treatment may include:  Taking medicine to control your blood sugar levels.  Changing your medicine or dosage if you take insulin or other diabetes medicines.  Lifestyle changes. These may include:  Exercising more.  Eating healthier foods.  Losing weight.  Treating an illness or infection.  Checking your blood sugar more often.  Stopping or reducing steroid medicines.  If your condition gets very bad, you will need to be treated in the hospital.    Follow these instructions at home:  General instructions    Take over-the-counter and prescription medicines only as told by your doctor.  Do not smoke or use any products that contain nicotine or tobacco. If you need help quitting, ask your doctor.  If you drink alcohol:  Limit how much you have to:  0–1 drink a day for women who are not pregnant.  0–2 drinks a day for men.  Know how much alcohol is in a drink. In the [place] one drink equals one 12 oz bottle of beer (355 mL), one 5 oz glass of wine (148 mL), or one 1½ oz glass of hard liquor (44 mL).  Manage stress. If you need help with this, ask your doctor.  Do exercises as told by your doctor.  Keep all follow-up visits.  Eating and drinking      Stay at a healthy weight.  Make sure you drink enough fluid when you:  Exercise.  Get sick.  Are in hot temperatures.  Drink enough fluid to keep your pee (urine) pale yellow.  If you have diabetes:      Know the symptoms of high blood sugar.  Follow your diabetes management plan as told by your doctor. Make sure you:  Take insulin and medicines as told.  Follow your exercise plan.  Follow your meal plan. Eat on time. Do not skip meals.  Check your blood sugar as often as told. Make sure you check before and after exercise. If you exercise longer or in a different way, check your blood sugar more often.  Follow your sick day plan whenever you cannot eat or drink normally. Make this plan ahead of time with your doctor.  Share your diabetes management plan with people in your workplace, school, and household.  Check your pee for ketones when you are ill and as told by your doctor.  Carry a card or wear jewelry that says that you have diabetes.  Where to find more information  American Diabetes Association: www.diabetes.org    Contact a doctor if:  Your blood sugar level is at or above 240 mg/dL (13.3 mmol/L) for 2 days in a row.  You have problems keeping your blood sugar in your target range.  You have high blood pressure often.  You have signs of illness, such as:  Feeling like you may vomit (feeling nauseous).  Vomiting.  A fever.  Get help right away if:  Your blood sugar monitor reads "high" even when you are taking insulin.  You have trouble breathing.  You have a change in how you think, feel, or act (mental status).  You feel like you may vomit, and the feeling does not go away.  You cannot stop vomiting.  These symptoms may be an emergency. Get medical help right away. Call your local emergency services (911 in the [place]  Do not wait to see if the symptoms will go away.  Do not drive yourself to the hospital.  Summary  Hyperglycemia is when the sugar (glucose) level in your blood is too high.  High blood sugar can happen to people who have or do not have diabetes.  Make sure you drink enough fluids and follow your meal plan. Exercise as often as told by your doctor.  Contact your doctor if you have problems keeping your blood sugar in your target range.  This information is not intended to replace advice given to you by your health care provider. Make sure you discuss any questions you have with your health care provider.    Urinary Tract Infection, Adult    A urinary tract infection (UTI) is an infection of any part of the urinary tract. The urinary tract includes the kidneys, ureters, bladder, and urethra. These organs make, store, and get rid of urine in the body.    An upper UTI affects the ureters and kidneys. A lower UTI affects the bladder and urethra.    What are the causes?  Most urinary tract infections are caused by bacteria in your genital area around your urethra, where urine leaves your body. These bacteria grow and cause inflammation of your urinary tract.    What increases the risk?  You are more likely to develop this condition if:  You have a urinary catheter that stays in place.  You are not able to control when you urinate or have a bowel movement (incontinence).  You are female and you:  Use a spermicide or diaphragm for birth control.  Have low estrogen levels.  Are pregnant.  You have certain genes that increase your risk.  You are sexually active.  You take antibiotic medicines.  You have a condition that causes your flow of urine to slow down, such as:  An enlarged prostate, if you are male.  Blockage in your urethra.  A kidney stone.  A nerve condition that affects your bladder control (neurogenic bladder).  Not getting enough to drink, or not urinating often.  You have certain medical conditions, such as:  Diabetes.  A weak disease-fighting system (immunesystem).  Sickle cell disease.  Gout.  Spinal cord injury.  What are the signs or symptoms?  Symptoms of this condition include:  Needing to urinate right away (urgency).  Frequent urination. This may include small amounts of urine each time you urinate.  Pain or burning with urination.  Blood in the urine.  Urine that smells bad or unusual.  Trouble urinating.  Cloudy urine.  Vaginal discharge, if you are female.  Pain in the abdomen or the lower back.  You may also have:  Vomiting or a decreased appetite.  Confusion.  Irritability or tiredness.  A fever or chills.  Diarrhea.  The first symptom in older adults may be confusion. In some cases, they may not have any symptoms until the infection has worsened.    How is this diagnosed?  This condition is diagnosed based on your medical history and a physical exam. You may also have other tests, including:  Urine tests.  Blood tests.  Tests for STIs (sexually transmitted infections).  If you have had more than one UTI, a cystoscopy or imaging studies may be done to determine the cause of the infections.    How is this treated?  Treatment for this condition includes:  Antibiotic medicine.  Over-the-counter medicines to treat discomfort.  Drinking enough water to stay hydrated.  If you have frequent infections or have other conditions such as a kidney stone, you may need to see a health care provider who specializes in the urinary tract (urologist).    In rare cases, urinary tract infections can cause sepsis. Sepsis is a life-threatening condition that occurs when the body responds to an infection. Sepsis is treated in the hospital with IV antibiotics, fluids, and other medicines.    Follow these instructions at home:    Medicines    Take over-the-counter and prescription medicines only as told by your health care provider.  If you were prescribed an antibiotic medicine, take it as told by your health care provider. Do not stop using the antibiotic even if you start to feel better.  General instructions    Make sure you:  Empty your bladder often and completely. Do not hold urine for long periods of time.  Empty your bladder after sex.  Wipe from front to back after urinating or having a bowel movement if you are female. Use each tissue only one time when you wipe.  Drink enough fluid to keep your urine pale yellow.  Keep all follow-up visits. This is important.  Contact a health care provider if:  Your symptoms do not get better after 1–2 days.  Your symptoms go away and then return.  Get help right away if:  You have severe pain in your back or your lower abdomen.  You have a fever or chills.  You have nausea or vomiting.  Summary  A urinary tract infection (UTI) is an infection of any part of the urinary tract, which includes the kidneys, ureters, bladder, and urethra.  Most urinary tract infections are caused by bacteria in your genital area.  Treatment for this condition often includes antibiotic medicines.  If you were prescribed an antibiotic medicine, take it as told by your health care provider. Do not stop using the antibiotic even if you start to feel better.  Keep all follow-up visits. This is important.  This information is not intended to replace advice given to you by your health care provider. Make sure you discuss any questions you have with your health care provider.

## 2023-11-16 NOTE — ED PROVIDER NOTE - PHYSICAL EXAMINATION
VITAL SIGNS: I have reviewed nursing notes and confirm.  CONSTITUTIONAL: Well appearing, in no acute distress.   SKIN:  warm and dry, no acute rash.   HEAD:  normocephalic, atraumatic.  EYES: EOM intact; conjunctiva and sclera clear.  ENT: No nasal discharge; airway clear. Mildly dry mm.   NECK: Supple.  CARD: S1, S2, Regular rate and rhythm.   RESP:  Clear to auscultation b/l, no wheezes, rales or rhonchi.  ABD: Normal bowel sounds; soft; non-distended; non-tender; no guarding/ rebound.  EXT: Normal ROM. No peripheral edema. Pulses intact and equal b/l.  NEURO: Alert, oriented, grossly unremarkable  PSYCH: Cooperative, mood and affect appropriate.

## 2023-11-16 NOTE — ED ADULT TRIAGE NOTE - CHIEF COMPLAINT QUOTE
Visit Information Date & Time Provider Department Dept. Phone Encounter #  
 10/17/2017  7:30 AM Celso Grey MD [facility] OFFICE-ANNEX [phone] [id number] Follow-up Instructions Return in about 2 months (around 12/17/2017), or if symptoms worsen or fail to improve. Your Appointments 12/14/2017  9:30 AM  
Any with Chika Gayle MD  
[address] [place] [address] OFFICE-ANNEX ([facility]) Appt Note: 3 mo f/u  
 [address] [facility] [address] [phone] [phone] Upcoming Health Maintenance Date Due DTaP/Tdap/Td series (1 - Tdap) 5/30/1994 PAP AKA CERVICAL CYTOLOGY 5/30/1994 INFLUENZA AGE 9 TO ADULT 8/1/2017 Allergies as of 10/17/2017  Review Complete On: 10/17/2017 By: Celso Grey MD  
  
 Severity Noted Reaction Type Reactions Lisinopril  10/17/2017    Swelling Current Immunizations  Never Reviewed No immunizations on file. Not reviewed this visit You Were Diagnosed With   
  
 Codes Comments Crohn's disease without complication, unspecified gastrointestinal tract location [facility])    -  Primary ICD-10-CM: K50.90 ICD-9-CM: 555.9 BMI 38.0-38.9,adult     ICD-10-CM: U65.29 
ICD-9-CM: V85.38 Vitals OB Status Smoking Status Medically Induced Never Smoker Your Updated Medication List  
  
   
This list is accurate as of: 10/17/17  8:20 AM.  Always use your most recent med list.  
  
  
  
  
 Urszula Brar Generic drug:  Lancets  
  
 azaTHIOprine 50 mg tablet Commonly known as:  IMURAN  
  
 hydrOXYzine pamoate 25 mg capsule Commonly known as:  VISTARIL  
  
 JOLIVETTE 0.35 mg Tab Generic drug:  norethindrone * naltrexone-buPROPion 8-90 mg Tber ER tablet Commonly known as:  Vita Welch Take 1 Tab by mouth daily. First Month:   Week 1 : 1 Tab AM Week 2 : 1 Tab AM, 1 Tab PM Week 3 : 2 Tab AM, 1 Tab PM Week 4 : 2 Tab AM, 2 Tab PM  
  
 * naltrexone-buPROPion 8-90 mg Tber ER tablet Commonly known as:  Simi Gey Week 5 and Beyond: Contrave 8mg/90mg #120  2 Tab AM, 2 Tab PM  
  
 PRECISION XTRA TEST strip Generic drug:  glucose blood VI test strips  
  
 triamcinolone acetonide 0.1 % topical cream  
Commonly known as:  KENALOG * Notice: This list has 2 medication(s) that are the same as other medications prescribed for you. Read the directions carefully, and ask your doctor or other care provider to review them with you. Prescriptions Printed Refills  
 naltrexone-buPROPion (CONTRAVE) 8-90 mg TbER ER tablet 0 Sig: Take 1 Tab by mouth daily. First Month:  
 
Week 1 : 1 Tab AM 
Week 2 : 1 Tab AM, 1 Tab PM 
Week 3 : 2 Tab AM, 1 Tab PM 
Week 4 : 2 Tab AM, 2 Tab PM  
 Class: Print Route: Oral  
 naltrexone-buPROPion (CONTRAVE) 8-90 mg TbER ER tablet 1 Sig: Week 5 and Beyond: Contrave 8mg/90mg #120 
 2 Tab AM, 2 Tab PM  
 Class: Print We Performed the Following HEMOGLOBIN A1C WITH EAG [49653 CPT(R)] Follow-up Instructions Return in about 2 months (around 12/17/2017), or if symptoms worsen or fail to improve. Introducing [facility]! [facility] introduces [employer] patient portal. Now you can access parts of your medical record, email your doctor's office, and request medication refills online. 1. In your internet browser, go to [email or web address] 2. Click on the First Time User? Click Here link in the Sign In box. You will see the New Member Sign Up page. 3. Enter your [employer] Access Code exactly as it appears below. You will not need to use this code after youve completed the sign-up process. If you do not sign up before the expiration date, you must request a new code.  
 
· [employer] Access Code: [email or web address] 
 Expires: 12/13/2017 11:34 AM 
 
4. Enter the last four digits of your Social Security Number (xxxx) and Date of Birth (mm/dd/yyyy) as indicated and click Submit. You will be taken to the next sign-up page. 5. Create a peerTransfer ID. This will be your peerTransfer login ID and cannot be changed, so think of one that is secure and easy to remember. 6. Create a peerTransfer password. You can change your password at any time. 7. Enter your Password Reset Question and Answer. This can be used at a later time if you forget your password. 8. Enter your e-mail address. You will receive e-mail notification when new information is available in [address] 9. Click Sign Up. You can now view and download portions of your medical record. 10. Click the Download Summary menu link to download a portable copy of your medical information. If you have questions, please visit the Frequently Asked Questions section of the peerTransfer website. Remember, peerTransfer is NOT to be used for urgent needs. For medical emergencies, dial 911. Now available from your iPhone and Android! Please provide this summary of care documentation to your next provider. Your primary care clinician is listed as Ayanna Sharp. If you have any questions after today's visit, please call [phone]. Pt bibems from [facility] infusion due to blood glucose level 478. Pt denies any complaints, no n/v/d or blurred vision. Pt states "I may have forgotten to take my insulin," on prednisone as part of oncology treatment.

## 2023-11-16 NOTE — ED ADULT NURSE NOTE - CHIEF COMPLAINT QUOTE
Pt bibems from [facility] infusion due to blood glucose level 478. Pt denies any complaints, no n/v/d or blurred vision. Pt states "I may have forgotten to take my insulin," on prednisone as part of oncology treatment.

## 2023-11-16 NOTE — ED PROVIDER NOTE - PATIENT PORTAL LINK FT
You can access the FollowMyHealth Patient Portal offered by [facility] by registering at the following website: [email or web address]. By joining [employer]’s FollowMyHealth portal, you will also be able to view your health information using other applications (apps) compatible with our system.

## 2023-11-16 NOTE — ED ADULT NURSE NOTE - OBJECTIVE STATEMENT
73y female presents to ED for hyperglycemia. Pt was at [facility] for chemotherapy infusion and was noted to be hyperglycemic, referred to ED for evaluation. Pt recently finished prednisone taper. Hx of diabetes, unsure if took insulin this morning. Denies n/v/pain. A&Ox4.

## 2023-11-18 LAB
-  AMIKACIN: SIGNIFICANT CHANGE UP
-  AMIKACIN: SIGNIFICANT CHANGE UP
-  AMPICILLIN/SULBACTAM: SIGNIFICANT CHANGE UP
-  AMPICILLIN/SULBACTAM: SIGNIFICANT CHANGE UP
-  AMPICILLIN: SIGNIFICANT CHANGE UP
-  AMPICILLIN: SIGNIFICANT CHANGE UP
-  CEFAZOLIN: SIGNIFICANT CHANGE UP
-  CEFAZOLIN: SIGNIFICANT CHANGE UP
-  CEFTRIAXONE: SIGNIFICANT CHANGE UP
-  CEFTRIAXONE: SIGNIFICANT CHANGE UP
-  CIPROFLOXACIN: SIGNIFICANT CHANGE UP
-  CIPROFLOXACIN: SIGNIFICANT CHANGE UP
-  ERTAPENEM: SIGNIFICANT CHANGE UP
-  ERTAPENEM: SIGNIFICANT CHANGE UP
-  GENTAMICIN: SIGNIFICANT CHANGE UP
-  GENTAMICIN: SIGNIFICANT CHANGE UP
-  MEROPENEM: SIGNIFICANT CHANGE UP
-  MEROPENEM: SIGNIFICANT CHANGE UP
-  NITROFURANTOIN: SIGNIFICANT CHANGE UP
-  NITROFURANTOIN: SIGNIFICANT CHANGE UP
-  PIPERACILLIN/TAZOBACTAM: SIGNIFICANT CHANGE UP
-  PIPERACILLIN/TAZOBACTAM: SIGNIFICANT CHANGE UP
-  TOBRAMYCIN: SIGNIFICANT CHANGE UP
-  TOBRAMYCIN: SIGNIFICANT CHANGE UP
-  TRIMETHOPRIM/SULFAMETHOXAZOLE: SIGNIFICANT CHANGE UP
-  TRIMETHOPRIM/SULFAMETHOXAZOLE: SIGNIFICANT CHANGE UP
CULTURE RESULTS: ABNORMAL
CULTURE RESULTS: ABNORMAL
METHOD TYPE: SIGNIFICANT CHANGE UP
METHOD TYPE: SIGNIFICANT CHANGE UP
ORGANISM # SPEC MICROSCOPIC CNT: ABNORMAL
ORGANISM # SPEC MICROSCOPIC CNT: ABNORMAL
ORGANISM # SPEC MICROSCOPIC CNT: SIGNIFICANT CHANGE UP
ORGANISM # SPEC MICROSCOPIC CNT: SIGNIFICANT CHANGE UP
SPECIMEN SOURCE: SIGNIFICANT CHANGE UP
SPECIMEN SOURCE: SIGNIFICANT CHANGE UP

## 2023-11-20 RX ORDER — NITROFURANTOIN MACROCRYSTAL 50 MG
1 CAPSULE ORAL
Qty: 10 | Refills: 0
Start: 2023-11-20 | End: 2023-11-24

## 2023-11-22 ENCOUNTER — LABORATORY RESULT (OUTPATIENT)
Age: 73
End: 2023-11-22

## 2023-11-22 ENCOUNTER — APPOINTMENT (OUTPATIENT)
Dept: INFUSION THERAPY | Facility: CLINIC | Age: 73
End: 2023-11-22

## 2023-11-22 ENCOUNTER — NON-APPOINTMENT (OUTPATIENT)
Age: 73
End: 2023-11-22

## 2023-11-22 ENCOUNTER — APPOINTMENT (OUTPATIENT)
Dept: HEMATOLOGY ONCOLOGY | Facility: CLINIC | Age: 73
End: 2023-11-22
Payer: MEDICARE

## 2023-11-22 ENCOUNTER — OUTPATIENT (OUTPATIENT)
Dept: OUTPATIENT SERVICES | Facility: HOSPITAL | Age: 73
LOS: 1 days | End: 2023-11-22
Payer: MEDICARE

## 2023-11-22 VITALS
WEIGHT: 144 LBS | SYSTOLIC BLOOD PRESSURE: 137 MMHG | RESPIRATION RATE: 18 BRPM | BODY MASS INDEX: 25.52 KG/M2 | DIASTOLIC BLOOD PRESSURE: 84 MMHG | TEMPERATURE: 97.4 F | HEART RATE: 66 BPM | HEIGHT: 63 IN | OXYGEN SATURATION: 97 %

## 2023-11-22 VITALS
SYSTOLIC BLOOD PRESSURE: 137 MMHG | HEIGHT: 63 IN | DIASTOLIC BLOOD PRESSURE: 84 MMHG | RESPIRATION RATE: 18 BRPM | WEIGHT: 143.96 LBS | OXYGEN SATURATION: 97 % | TEMPERATURE: 97 F | HEART RATE: 66 BPM

## 2023-11-22 DIAGNOSIS — C34.90 MALIGNANT NEOPLASM OF UNSPECIFIED PART OF UNSPECIFIED BRONCHUS OR LUNG: ICD-10-CM

## 2023-11-22 DIAGNOSIS — Z96.641 PRESENCE OF RIGHT ARTIFICIAL HIP JOINT: Chronic | ICD-10-CM

## 2023-11-22 LAB
MAGNESIUM SERPL-MCNC: 1.8 MG/DL
TSH SERPL-ACNC: 0.91 UIU/ML

## 2023-11-22 PROCEDURE — 96413 CHEMO IV INFUSION 1 HR: CPT

## 2023-11-22 PROCEDURE — 96375 TX/PRO/DX INJ NEW DRUG ADDON: CPT

## 2023-11-22 PROCEDURE — 96415 CHEMO IV INFUSION ADDL HR: CPT

## 2023-11-22 PROCEDURE — 96367 TX/PROPH/DG ADDL SEQ IV INF: CPT

## 2023-11-22 PROCEDURE — 99214 OFFICE O/P EST MOD 30 MIN: CPT | Mod: 25

## 2023-11-22 PROCEDURE — 96417 CHEMO IV INFUS EACH ADDL SEQ: CPT

## 2023-11-22 RX ORDER — DIPHENHYDRAMINE HCL 50 MG
25 CAPSULE ORAL ONCE
Refills: 0 | Status: COMPLETED | OUTPATIENT
Start: 2023-11-22 | End: 2023-11-22

## 2023-11-22 RX ORDER — FOSAPREPITANT DIMEGLUMINE 150 MG/5ML
150 INJECTION, POWDER, LYOPHILIZED, FOR SOLUTION INTRAVENOUS ONCE
Refills: 0 | Status: COMPLETED | OUTPATIENT
Start: 2023-11-22 | End: 2023-11-22

## 2023-11-22 RX ORDER — FAMOTIDINE 10 MG/ML
20 INJECTION INTRAVENOUS ONCE
Refills: 0 | Status: COMPLETED | OUTPATIENT
Start: 2023-11-22 | End: 2023-11-22

## 2023-11-22 RX ORDER — PEMBROLIZUMAB 25 MG/ML
200 INJECTION, SOLUTION INTRAVENOUS ONCE
Refills: 0 | Status: COMPLETED | OUTPATIENT
Start: 2023-11-22 | End: 2023-11-22

## 2023-11-22 RX ORDER — PALONOSETRON HYDROCHLORIDE 0.25 MG/5ML
0.25 INJECTION, SOLUTION INTRAVENOUS ONCE
Refills: 0 | Status: COMPLETED | OUTPATIENT
Start: 2023-11-22 | End: 2023-11-22

## 2023-11-22 RX ORDER — DEXAMETHASONE 0.5 MG/5ML
10 ELIXIR ORAL ONCE
Refills: 0 | Status: COMPLETED | OUTPATIENT
Start: 2023-11-22 | End: 2023-11-22

## 2023-11-22 RX ADMIN — Medication 204 MILLIGRAM(S): at 13:07

## 2023-11-22 RX ADMIN — FOSAPREPITANT DIMEGLUMINE 500 MILLIGRAM(S): 150 INJECTION, POWDER, LYOPHILIZED, FOR SOLUTION INTRAVENOUS at 14:35

## 2023-11-22 RX ADMIN — Medication 300 MILLIGRAM(S): at 18:17

## 2023-11-22 RX ADMIN — FOSAPREPITANT DIMEGLUMINE 150 MILLIGRAM(S): 150 INJECTION, POWDER, LYOPHILIZED, FOR SOLUTION INTRAVENOUS at 15:05

## 2023-11-22 RX ADMIN — PEMBROLIZUMAB 200 MILLIGRAM(S): 25 INJECTION, SOLUTION INTRAVENOUS at 12:55

## 2023-11-22 RX ADMIN — Medication 202 MILLIGRAM(S): at 14:15

## 2023-11-22 RX ADMIN — PEMBROLIZUMAB 200 MILLIGRAM(S): 25 INJECTION, SOLUTION INTRAVENOUS at 12:25

## 2023-11-22 RX ADMIN — FAMOTIDINE 208 MILLIGRAM(S): 10 INJECTION INTRAVENOUS at 14:00

## 2023-11-22 RX ADMIN — Medication 25 MILLIGRAM(S): at 14:30

## 2023-11-22 RX ADMIN — PALONOSETRON HYDROCHLORIDE 0.25 MILLIGRAM(S): 0.25 INJECTION, SOLUTION INTRAVENOUS at 13:00

## 2023-11-22 RX ADMIN — PACLITAXEL 230 MILLIGRAM(S): 6 INJECTION, SOLUTION, CONCENTRATE INTRAVENOUS at 15:16

## 2023-11-22 RX ADMIN — FAMOTIDINE 20 MILLIGRAM(S): 10 INJECTION INTRAVENOUS at 14:15

## 2023-11-22 RX ADMIN — Medication 10 MILLIGRAM(S): at 13:22

## 2023-11-22 RX ADMIN — Medication 300 MILLIGRAM(S): at 18:47

## 2023-11-22 RX ADMIN — PACLITAXEL 230 MILLIGRAM(S): 6 INJECTION, SOLUTION, CONCENTRATE INTRAVENOUS at 18:16

## 2023-12-07 ENCOUNTER — APPOINTMENT (OUTPATIENT)
Dept: HEART AND VASCULAR | Facility: CLINIC | Age: 73
End: 2023-12-07
Payer: MEDICARE

## 2023-12-07 VITALS
TEMPERATURE: 97.3 F | OXYGEN SATURATION: 97 % | DIASTOLIC BLOOD PRESSURE: 77 MMHG | WEIGHT: 138 LBS | BODY MASS INDEX: 24.45 KG/M2 | HEIGHT: 63 IN | HEART RATE: 72 BPM | SYSTOLIC BLOOD PRESSURE: 151 MMHG

## 2023-12-07 PROCEDURE — 99212 OFFICE O/P EST SF 10 MIN: CPT

## 2023-12-07 RX ORDER — ATORVASTATIN CALCIUM 40 MG/1
40 TABLET, FILM COATED ORAL
Refills: 0 | Status: DISCONTINUED | COMMUNITY
End: 2023-12-07

## 2023-12-08 ENCOUNTER — NON-APPOINTMENT (OUTPATIENT)
Age: 73
End: 2023-12-08

## 2023-12-11 NOTE — PROGRESS NOTE ADULT - PROBLEM SELECTOR PROBLEM 5
HTN (hypertension) Milagros Jackson D.O. [facility]  [address] [facility]. [address], [address] [place] [address]  Phone: [phone]  Fax: [phone]    12/11/2023    Chief Complaint   Patient presents with    Spasms     6 w f/u botox       An independent historian was not needed. Interval history: Patient feels the Botox in her right arm was too strong and she is having trouble gripping now. She feels her left leg is 75% improved and right leg is 50% improved. She is more easily able to be positioned in wheelchair and moved in the bed. The patient has completed the following treatments since last seen: none.       EMG guided chemo-denervation with Botox on 10/11/23 at the following sites:  Right arm:   Flexor carpi radialis- 12.5 units @1 site ( 3-4FB distal to line between ME and biceps)  Flexor carpi ulnaris- 12.5 units @ 1 site ( 2 FB volar to ulna upper and middle 1/3 junction)  Flexor Digitorum Profundus for flexion of DIP  30 units  (4 FB distal to olecranon volar to ulna)  Flexor Digitorum Superficialis for flexion of PIP  30 units (grasp folar wrist point to biceps medial to index)     Right leg:  Quadriceps mechnism 100 units @ 6 sites  Medial gastrocnemius- 50 units @ 3 sites (1/4 distance popliteal crease to heel)  Lateral Gastrocnemius- 50 units @ 3 sites (1/4 distance popliteal crease to heel)  Soleus- 75 units @ 3 sites (2/3 distance from heel to popliteal crease medially)  Tibialis Posterior- 50 untis @ 3 sites  (midway between heel and popliteal crease medially)      Left leg:   Quadriceps mechnism 50 units @ 6 sites  Medial gastrocnemius- 25 units @ 3 sites (1/4 distance popliteal crease to heel)  Lateral Gastrocnemius- 25 units @ 3 sites (1/4 distance popliteal crease to heel)  Soleus- 25 units @ 3 sites (2/3 distance from heel to popliteal crease medially)  Tibialis Posterior- 25  untis @ 3 sites  (midway between heel and popliteal crease medially)      Total units: 560 units; total Telephone Encounter by Elvi Sierra RN at 09/05/17 12:40 PM     Author:  Elvi Sierra RN Service:  (none) Author Type:  Registered Nurse     Filed:  09/05/17 12:40 PM Encounter Date:  9/5/2017 Status:  Signed     :  Elvi Sierra RN (Registered Nurse)            patient states that both numbers she was given are busy always  patient given the [facility] IVF number  no further question[KA1.1M]      Revision History        User Key Date/Time User Provider Type Action    > KA1.1 09/05/17 12:40 PM Elvi Sierra, RN Registered Nurse Sign    M - Manual

## 2023-12-12 NOTE — PRE-ANESTHESIA EVALUATION ADULT - NSANTHDISPORD_GEN_ALL_CORE
Problem: Chronic Conditions and Co-morbidities  Goal: Patient's chronic conditions and co-morbidity symptoms are monitored and maintained or improved  Outcome: Progressing     Problem: Wound:  Goal: Will show signs of wound healing; wound closure and no evidence of infection  Description: Will show signs of wound healing; wound closure and no evidence of infection  Outcome: Progressing     Problem: Pressure Ulcer:  Goal: Signs of wound healing will improve  Description: Signs of wound healing will improve  Outcome: Progressing  Goal: Absence of new pressure ulcer  Description: Absence of new pressure ulcer  Outcome: Progressing  Goal: Will show no infection signs and symptoms  Description: Will show no infection signs and symptoms  Outcome: Progressing     Problem: Weight control:  Goal: Ability to maintain an optimal weight for height and age will be supported  Description: Ability to maintain an optimal weight for height and age will be supported  Outcome: Progressing     Problem: Falls - Risk of:  Goal: Will remain free from falls  Description: Will remain free from falls  Outcome: Progressing PACU

## 2023-12-13 ENCOUNTER — APPOINTMENT (OUTPATIENT)
Dept: INTERNAL MEDICINE | Facility: CLINIC | Age: 73
End: 2023-12-13
Payer: MEDICARE

## 2023-12-13 VITALS
DIASTOLIC BLOOD PRESSURE: 89 MMHG | BODY MASS INDEX: 24.45 KG/M2 | HEIGHT: 63 IN | WEIGHT: 138 LBS | HEART RATE: 102 BPM | TEMPERATURE: 98 F | OXYGEN SATURATION: 99 % | SYSTOLIC BLOOD PRESSURE: 144 MMHG

## 2023-12-13 PROCEDURE — 99214 OFFICE O/P EST MOD 30 MIN: CPT

## 2023-12-13 NOTE — ASSESSMENT
[FreeTextEntry1] : Poorly controlled Diabetes She was supposed to be taking pre meal Novolog which I reordered; She does not want to come into hospital. FS will be rechecked  checked tomorrow and and if still out of control will encourage her to come into hospital. May also discuss with patient's daughter.   ;

## 2023-12-13 NOTE — HISTORY OF PRESENT ILLNESS
[FreeTextEntry1] : All notes read. Getting chemo  No RA flare ups  [de-identified] : Glucoses have been increased. All medication without change   Some confusion Nurse to come to patient house which would be helpful FS today 552 There is no question a cognitive problem and concerned she is not taking medication and insulin correctly

## 2023-12-13 NOTE — HEALTH RISK ASSESSMENT
[No] : No [No falls in past year] : Patient reported no falls in the past year [0] : 2) Feeling down, depressed, or hopeless: Not at all (0) [[id number]] : 0

## 2023-12-14 ENCOUNTER — LABORATORY RESULT (OUTPATIENT)
Age: 73
End: 2023-12-14

## 2023-12-14 ENCOUNTER — APPOINTMENT (OUTPATIENT)
Dept: HEMATOLOGY ONCOLOGY | Facility: CLINIC | Age: 73
End: 2023-12-14

## 2023-12-14 ENCOUNTER — EMERGENCY (EMERGENCY)
Facility: HOSPITAL | Age: 73
LOS: 1 days | Discharge: HOME | End: 2023-12-14
Attending: STUDENT IN AN ORGANIZED HEALTH CARE EDUCATION/TRAINING PROGRAM | Admitting: STUDENT IN AN ORGANIZED HEALTH CARE EDUCATION/TRAINING PROGRAM
Payer: MEDICARE

## 2023-12-14 ENCOUNTER — APPOINTMENT (OUTPATIENT)
Dept: HEMATOLOGY ONCOLOGY | Facility: CLINIC | Age: 73
End: 2023-12-14
Payer: MEDICARE

## 2023-12-14 VITALS
HEART RATE: 84 BPM | SYSTOLIC BLOOD PRESSURE: 145 MMHG | OXYGEN SATURATION: 97 % | DIASTOLIC BLOOD PRESSURE: 80 MMHG | RESPIRATION RATE: 17 BRPM

## 2023-12-14 VITALS
SYSTOLIC BLOOD PRESSURE: 138 MMHG | RESPIRATION RATE: 18 BRPM | HEIGHT: 63 IN | DIASTOLIC BLOOD PRESSURE: 87 MMHG | HEART RATE: 97 BPM | BODY MASS INDEX: 24.8 KG/M2 | OXYGEN SATURATION: 96 % | TEMPERATURE: 97.8 F | WEIGHT: 140 LBS

## 2023-12-14 VITALS
OXYGEN SATURATION: 97 % | WEIGHT: 136.91 LBS | SYSTOLIC BLOOD PRESSURE: 152 MMHG | RESPIRATION RATE: 16 BRPM | TEMPERATURE: 98 F | HEART RATE: 76 BPM | DIASTOLIC BLOOD PRESSURE: 76 MMHG | HEIGHT: 63 IN

## 2023-12-14 DIAGNOSIS — E11.65 TYPE 2 DIABETES MELLITUS WITH HYPERGLYCEMIA: ICD-10-CM

## 2023-12-14 DIAGNOSIS — E86.1 HYPOVOLEMIA: ICD-10-CM

## 2023-12-14 DIAGNOSIS — C34.90 MALIGNANT NEOPLASM OF UNSPECIFIED PART OF UNSPECIFIED BRONCHUS OR LUNG: ICD-10-CM

## 2023-12-14 DIAGNOSIS — Z88.1 ALLERGY STATUS TO OTHER ANTIBIOTIC AGENTS STATUS: ICD-10-CM

## 2023-12-14 DIAGNOSIS — I10 ESSENTIAL (PRIMARY) HYPERTENSION: ICD-10-CM

## 2023-12-14 DIAGNOSIS — Z79.4 LONG TERM (CURRENT) USE OF INSULIN: ICD-10-CM

## 2023-12-14 DIAGNOSIS — N39.0 URINARY TRACT INFECTION, SITE NOT SPECIFIED: ICD-10-CM

## 2023-12-14 DIAGNOSIS — Z96.641 PRESENCE OF RIGHT ARTIFICIAL HIP JOINT: Chronic | ICD-10-CM

## 2023-12-14 LAB
ALBUMIN SERPL ELPH-MCNC: 3.5 G/DL
ALBUMIN SERPL ELPH-MCNC: 3.8 G/DL — SIGNIFICANT CHANGE UP (ref 3.3–5)
ALBUMIN SERPL ELPH-MCNC: 3.8 G/DL — SIGNIFICANT CHANGE UP (ref 3.3–5)
ALP BLD-CCNC: 206 U/L
ALP SERPL-CCNC: 186 U/L — HIGH (ref 40–120)
ALP SERPL-CCNC: 186 U/L — HIGH (ref 40–120)
ALT FLD-CCNC: 21 U/L — SIGNIFICANT CHANGE UP (ref 10–45)
ALT FLD-CCNC: 21 U/L — SIGNIFICANT CHANGE UP (ref 10–45)
ALT SERPL-CCNC: 22 U/L
ANION GAP SERPL CALC-SCNC: 12 MMOL/L — SIGNIFICANT CHANGE UP (ref 5–17)
ANION GAP SERPL CALC-SCNC: 12 MMOL/L — SIGNIFICANT CHANGE UP (ref 5–17)
ANION GAP SERPL CALC-SCNC: 6 MMOL/L
APPEARANCE UR: CLEAR — SIGNIFICANT CHANGE UP
APPEARANCE UR: CLEAR — SIGNIFICANT CHANGE UP
AST SERPL-CCNC: 17 U/L — SIGNIFICANT CHANGE UP (ref 10–40)
AST SERPL-CCNC: 17 U/L — SIGNIFICANT CHANGE UP (ref 10–40)
AST SERPL-CCNC: 18 U/L
B-OH-BUTYR SERPL-SCNC: 0.2 MMOL/L — SIGNIFICANT CHANGE UP
B-OH-BUTYR SERPL-SCNC: 0.2 MMOL/L — SIGNIFICANT CHANGE UP
BACTERIA # UR AUTO: ABNORMAL /HPF
BACTERIA # UR AUTO: ABNORMAL /HPF
BASE EXCESS BLDV CALC-SCNC: 2.8 MMOL/L — SIGNIFICANT CHANGE UP (ref -2–3)
BASE EXCESS BLDV CALC-SCNC: 2.8 MMOL/L — SIGNIFICANT CHANGE UP (ref -2–3)
BASOPHILS # BLD AUTO: 0.05 K/UL — SIGNIFICANT CHANGE UP (ref 0–0.2)
BASOPHILS # BLD AUTO: 0.05 K/UL — SIGNIFICANT CHANGE UP (ref 0–0.2)
BASOPHILS NFR BLD AUTO: 0.6 % — SIGNIFICANT CHANGE UP (ref 0–2)
BASOPHILS NFR BLD AUTO: 0.6 % — SIGNIFICANT CHANGE UP (ref 0–2)
BILIRUB SERPL-MCNC: 0.4 MG/DL — SIGNIFICANT CHANGE UP (ref 0.2–1.2)
BILIRUB SERPL-MCNC: 0.4 MG/DL — SIGNIFICANT CHANGE UP (ref 0.2–1.2)
BILIRUB SERPL-MCNC: 0.7 MG/DL
BILIRUB UR-MCNC: NEGATIVE — SIGNIFICANT CHANGE UP
BILIRUB UR-MCNC: NEGATIVE — SIGNIFICANT CHANGE UP
BUN SERPL-MCNC: 21 MG/DL
BUN SERPL-MCNC: 22 MG/DL — SIGNIFICANT CHANGE UP (ref 7–23)
BUN SERPL-MCNC: 22 MG/DL — SIGNIFICANT CHANGE UP (ref 7–23)
CA-I SERPL-SCNC: 1.27 MMOL/L — SIGNIFICANT CHANGE UP (ref 1.15–1.33)
CA-I SERPL-SCNC: 1.27 MMOL/L — SIGNIFICANT CHANGE UP (ref 1.15–1.33)
CALCIUM SERPL-MCNC: 10.3 MG/DL
CALCIUM SERPL-MCNC: 9.6 MG/DL — SIGNIFICANT CHANGE UP (ref 8.4–10.5)
CALCIUM SERPL-MCNC: 9.6 MG/DL — SIGNIFICANT CHANGE UP (ref 8.4–10.5)
CAST: 1 /LPF — SIGNIFICANT CHANGE UP (ref 0–4)
CAST: 1 /LPF — SIGNIFICANT CHANGE UP (ref 0–4)
CHLORIDE SERPL-SCNC: 94 MMOL/L — LOW (ref 96–108)
CHLORIDE SERPL-SCNC: 94 MMOL/L — LOW (ref 96–108)
CHLORIDE SERPL-SCNC: 96 MMOL/L
CO2 BLDV-SCNC: 29.9 MMOL/L — HIGH (ref 22–26)
CO2 BLDV-SCNC: 29.9 MMOL/L — HIGH (ref 22–26)
CO2 SERPL-SCNC: 24 MMOL/L — SIGNIFICANT CHANGE UP (ref 22–31)
CO2 SERPL-SCNC: 24 MMOL/L — SIGNIFICANT CHANGE UP (ref 22–31)
CO2 SERPL-SCNC: 31 MMOL/L
COLOR SPEC: YELLOW — SIGNIFICANT CHANGE UP
COLOR SPEC: YELLOW — SIGNIFICANT CHANGE UP
CREAT SERPL-MCNC: 1.18 MG/DL — SIGNIFICANT CHANGE UP (ref 0.5–1.3)
CREAT SERPL-MCNC: 1.18 MG/DL — SIGNIFICANT CHANGE UP (ref 0.5–1.3)
CREAT SERPL-MCNC: 1.4 MG/DL
DIFF PNL FLD: ABNORMAL
DIFF PNL FLD: ABNORMAL
EGFR: 39 ML/MIN/1.73M2
EGFR: 48 ML/MIN/1.73M2 — LOW
EGFR: 48 ML/MIN/1.73M2 — LOW
EOSINOPHIL # BLD AUTO: 0.13 K/UL — SIGNIFICANT CHANGE UP (ref 0–0.5)
EOSINOPHIL # BLD AUTO: 0.13 K/UL — SIGNIFICANT CHANGE UP (ref 0–0.5)
EOSINOPHIL NFR BLD AUTO: 1.7 % — SIGNIFICANT CHANGE UP (ref 0–6)
EOSINOPHIL NFR BLD AUTO: 1.7 % — SIGNIFICANT CHANGE UP (ref 0–6)
GAS PNL BLDV: 128 MMOL/L — LOW (ref 136–145)
GAS PNL BLDV: 128 MMOL/L — LOW (ref 136–145)
GAS PNL BLDV: SIGNIFICANT CHANGE UP
GLUCOSE SERPL-MCNC: 538 MG/DL — CRITICAL HIGH (ref 70–99)
GLUCOSE SERPL-MCNC: 538 MG/DL — CRITICAL HIGH (ref 70–99)
GLUCOSE SERPL-MCNC: 571 MG/DL
GLUCOSE UR QL: >=1000 MG/DL
GLUCOSE UR QL: >=1000 MG/DL
HCO3 BLDV-SCNC: 28 MMOL/L — SIGNIFICANT CHANGE UP (ref 22–29)
HCO3 BLDV-SCNC: 28 MMOL/L — SIGNIFICANT CHANGE UP (ref 22–29)
HCT VFR BLD CALC: 33.9 % — LOW (ref 34.5–45)
HCT VFR BLD CALC: 33.9 % — LOW (ref 34.5–45)
HGB BLD-MCNC: 11.1 G/DL — LOW (ref 11.5–15.5)
HGB BLD-MCNC: 11.1 G/DL — LOW (ref 11.5–15.5)
IMM GRANULOCYTES NFR BLD AUTO: 1.3 % — HIGH (ref 0–0.9)
IMM GRANULOCYTES NFR BLD AUTO: 1.3 % — HIGH (ref 0–0.9)
KETONES UR-MCNC: NEGATIVE MG/DL — SIGNIFICANT CHANGE UP
KETONES UR-MCNC: NEGATIVE MG/DL — SIGNIFICANT CHANGE UP
LACTATE SERPL-SCNC: 1.8 MMOL/L — SIGNIFICANT CHANGE UP (ref 0.5–2)
LACTATE SERPL-SCNC: 1.8 MMOL/L — SIGNIFICANT CHANGE UP (ref 0.5–2)
LEUKOCYTE ESTERASE UR-ACNC: ABNORMAL
LEUKOCYTE ESTERASE UR-ACNC: ABNORMAL
LIDOCAIN IGE QN: 162 U/L — HIGH (ref 7–60)
LIDOCAIN IGE QN: 162 U/L — HIGH (ref 7–60)
LYMPHOCYTES # BLD AUTO: 1.47 K/UL — SIGNIFICANT CHANGE UP (ref 1–3.3)
LYMPHOCYTES # BLD AUTO: 1.47 K/UL — SIGNIFICANT CHANGE UP (ref 1–3.3)
LYMPHOCYTES # BLD AUTO: 18.8 % — SIGNIFICANT CHANGE UP (ref 13–44)
LYMPHOCYTES # BLD AUTO: 18.8 % — SIGNIFICANT CHANGE UP (ref 13–44)
MAGNESIUM SERPL-MCNC: 1.6 MG/DL — SIGNIFICANT CHANGE UP (ref 1.6–2.6)
MAGNESIUM SERPL-MCNC: 1.6 MG/DL — SIGNIFICANT CHANGE UP (ref 1.6–2.6)
MCHC RBC-ENTMCNC: 28.8 PG — SIGNIFICANT CHANGE UP (ref 27–34)
MCHC RBC-ENTMCNC: 28.8 PG — SIGNIFICANT CHANGE UP (ref 27–34)
MCHC RBC-ENTMCNC: 32.7 GM/DL — SIGNIFICANT CHANGE UP (ref 32–36)
MCHC RBC-ENTMCNC: 32.7 GM/DL — SIGNIFICANT CHANGE UP (ref 32–36)
MCV RBC AUTO: 88.1 FL — SIGNIFICANT CHANGE UP (ref 80–100)
MCV RBC AUTO: 88.1 FL — SIGNIFICANT CHANGE UP (ref 80–100)
MONOCYTES # BLD AUTO: 0.6 K/UL — SIGNIFICANT CHANGE UP (ref 0–0.9)
MONOCYTES # BLD AUTO: 0.6 K/UL — SIGNIFICANT CHANGE UP (ref 0–0.9)
MONOCYTES NFR BLD AUTO: 7.7 % — SIGNIFICANT CHANGE UP (ref 2–14)
MONOCYTES NFR BLD AUTO: 7.7 % — SIGNIFICANT CHANGE UP (ref 2–14)
NEUTROPHILS # BLD AUTO: 5.46 K/UL — SIGNIFICANT CHANGE UP (ref 1.8–7.4)
NEUTROPHILS # BLD AUTO: 5.46 K/UL — SIGNIFICANT CHANGE UP (ref 1.8–7.4)
NEUTROPHILS NFR BLD AUTO: 69.9 % — SIGNIFICANT CHANGE UP (ref 43–77)
NEUTROPHILS NFR BLD AUTO: 69.9 % — SIGNIFICANT CHANGE UP (ref 43–77)
NITRITE UR-MCNC: POSITIVE
NITRITE UR-MCNC: POSITIVE
NRBC # BLD: 0 /100 WBCS — SIGNIFICANT CHANGE UP (ref 0–0)
NRBC # BLD: 0 /100 WBCS — SIGNIFICANT CHANGE UP (ref 0–0)
PCO2 BLDV: 47 MMHG — HIGH (ref 39–42)
PCO2 BLDV: 47 MMHG — HIGH (ref 39–42)
PH BLDV: 7.39 — SIGNIFICANT CHANGE UP (ref 7.32–7.43)
PH BLDV: 7.39 — SIGNIFICANT CHANGE UP (ref 7.32–7.43)
PH UR: 7.5 — SIGNIFICANT CHANGE UP (ref 5–8)
PH UR: 7.5 — SIGNIFICANT CHANGE UP (ref 5–8)
PHOSPHATE SERPL-MCNC: 3.2 MG/DL — SIGNIFICANT CHANGE UP (ref 2.5–4.5)
PHOSPHATE SERPL-MCNC: 3.2 MG/DL — SIGNIFICANT CHANGE UP (ref 2.5–4.5)
PLATELET # BLD AUTO: 102 K/UL — LOW (ref 150–400)
PLATELET # BLD AUTO: 102 K/UL — LOW (ref 150–400)
PO2 BLDV: 60 MMHG — HIGH (ref 25–45)
PO2 BLDV: 60 MMHG — HIGH (ref 25–45)
POTASSIUM BLDV-SCNC: 4.7 MMOL/L — SIGNIFICANT CHANGE UP (ref 3.5–5.1)
POTASSIUM BLDV-SCNC: 4.7 MMOL/L — SIGNIFICANT CHANGE UP (ref 3.5–5.1)
POTASSIUM SERPL-MCNC: 4.6 MMOL/L — SIGNIFICANT CHANGE UP (ref 3.5–5.3)
POTASSIUM SERPL-MCNC: 4.6 MMOL/L — SIGNIFICANT CHANGE UP (ref 3.5–5.3)
POTASSIUM SERPL-SCNC: 4.6 MMOL/L — SIGNIFICANT CHANGE UP (ref 3.5–5.3)
POTASSIUM SERPL-SCNC: 4.6 MMOL/L — SIGNIFICANT CHANGE UP (ref 3.5–5.3)
POTASSIUM SERPL-SCNC: 4.9 MMOL/L
PROT SERPL-MCNC: 6.7 G/DL
PROT SERPL-MCNC: 7.1 G/DL — SIGNIFICANT CHANGE UP (ref 6–8.3)
PROT SERPL-MCNC: 7.1 G/DL — SIGNIFICANT CHANGE UP (ref 6–8.3)
PROT UR-MCNC: SIGNIFICANT CHANGE UP MG/DL
PROT UR-MCNC: SIGNIFICANT CHANGE UP MG/DL
RBC # BLD: 3.85 M/UL — SIGNIFICANT CHANGE UP (ref 3.8–5.2)
RBC # BLD: 3.85 M/UL — SIGNIFICANT CHANGE UP (ref 3.8–5.2)
RBC # FLD: 14.4 % — SIGNIFICANT CHANGE UP (ref 10.3–14.5)
RBC # FLD: 14.4 % — SIGNIFICANT CHANGE UP (ref 10.3–14.5)
RBC CASTS # UR COMP ASSIST: 1 /HPF — SIGNIFICANT CHANGE UP (ref 0–4)
RBC CASTS # UR COMP ASSIST: 1 /HPF — SIGNIFICANT CHANGE UP (ref 0–4)
SAO2 % BLDV: 90.9 % — HIGH (ref 67–88)
SAO2 % BLDV: 90.9 % — HIGH (ref 67–88)
SODIUM SERPL-SCNC: 130 MMOL/L — LOW (ref 135–145)
SODIUM SERPL-SCNC: 130 MMOL/L — LOW (ref 135–145)
SODIUM SERPL-SCNC: 133 MMOL/L
SP GR SPEC: 1.02 — SIGNIFICANT CHANGE UP (ref 1–1.03)
SP GR SPEC: 1.02 — SIGNIFICANT CHANGE UP (ref 1–1.03)
SQUAMOUS # UR AUTO: 0 /HPF — SIGNIFICANT CHANGE UP (ref 0–5)
SQUAMOUS # UR AUTO: 0 /HPF — SIGNIFICANT CHANGE UP (ref 0–5)
UROBILINOGEN FLD QL: 0.2 MG/DL — SIGNIFICANT CHANGE UP (ref 0.2–1)
UROBILINOGEN FLD QL: 0.2 MG/DL — SIGNIFICANT CHANGE UP (ref 0.2–1)
WBC # BLD: 7.81 K/UL — SIGNIFICANT CHANGE UP (ref 3.8–10.5)
WBC # BLD: 7.81 K/UL — SIGNIFICANT CHANGE UP (ref 3.8–10.5)
WBC # FLD AUTO: 7.81 K/UL — SIGNIFICANT CHANGE UP (ref 3.8–10.5)
WBC # FLD AUTO: 7.81 K/UL — SIGNIFICANT CHANGE UP (ref 3.8–10.5)
WBC UR QL: 121 /HPF — HIGH (ref 0–5)
WBC UR QL: 121 /HPF — HIGH (ref 0–5)

## 2023-12-14 PROCEDURE — 83605 ASSAY OF LACTIC ACID: CPT

## 2023-12-14 PROCEDURE — 96374 THER/PROPH/DIAG INJ IV PUSH: CPT

## 2023-12-14 PROCEDURE — 83735 ASSAY OF MAGNESIUM: CPT

## 2023-12-14 PROCEDURE — 99285 EMERGENCY DEPT VISIT HI MDM: CPT

## 2023-12-14 PROCEDURE — 85025 COMPLETE CBC W/AUTO DIFF WBC: CPT

## 2023-12-14 PROCEDURE — 99214 OFFICE O/P EST MOD 30 MIN: CPT | Mod: 25

## 2023-12-14 PROCEDURE — 99284 EMERGENCY DEPT VISIT MOD MDM: CPT | Mod: 25

## 2023-12-14 PROCEDURE — 93005 ELECTROCARDIOGRAM TRACING: CPT

## 2023-12-14 PROCEDURE — 84295 ASSAY OF SERUM SODIUM: CPT

## 2023-12-14 PROCEDURE — 81001 URINALYSIS AUTO W/SCOPE: CPT

## 2023-12-14 PROCEDURE — 82330 ASSAY OF CALCIUM: CPT

## 2023-12-14 PROCEDURE — 84132 ASSAY OF SERUM POTASSIUM: CPT

## 2023-12-14 PROCEDURE — 93010 ELECTROCARDIOGRAM REPORT: CPT

## 2023-12-14 PROCEDURE — 83690 ASSAY OF LIPASE: CPT

## 2023-12-14 PROCEDURE — 80053 COMPREHEN METABOLIC PANEL: CPT

## 2023-12-14 PROCEDURE — 84100 ASSAY OF PHOSPHORUS: CPT

## 2023-12-14 PROCEDURE — 82803 BLOOD GASES ANY COMBINATION: CPT

## 2023-12-14 PROCEDURE — 96375 TX/PRO/DX INJ NEW DRUG ADDON: CPT

## 2023-12-14 PROCEDURE — 87086 URINE CULTURE/COLONY COUNT: CPT

## 2023-12-14 PROCEDURE — 36415 COLL VENOUS BLD VENIPUNCTURE: CPT

## 2023-12-14 PROCEDURE — 82010 KETONE BODYS QUAN: CPT

## 2023-12-14 PROCEDURE — 82962 GLUCOSE BLOOD TEST: CPT

## 2023-12-14 PROCEDURE — 87186 SC STD MICRODIL/AGAR DIL: CPT

## 2023-12-14 PROCEDURE — 87184 SC STD DISK METHOD PER PLATE: CPT

## 2023-12-14 RX ORDER — INSULIN HUMAN 100 [IU]/ML
8 INJECTION, SOLUTION SUBCUTANEOUS ONCE
Refills: 0 | Status: COMPLETED | OUTPATIENT
Start: 2023-12-14 | End: 2023-12-14

## 2023-12-14 RX ORDER — CEFPODOXIME PROXETIL 100 MG
1 TABLET ORAL
Qty: 14 | Refills: 0
Start: 2023-12-14 | End: 2023-12-20

## 2023-12-14 RX ORDER — SODIUM CHLORIDE 9 MG/ML
1000 INJECTION, SOLUTION INTRAVENOUS ONCE
Refills: 0 | Status: COMPLETED | OUTPATIENT
Start: 2023-12-14 | End: 2023-12-14

## 2023-12-14 RX ORDER — CEFTRIAXONE 500 MG/1
1000 INJECTION, POWDER, FOR SOLUTION INTRAMUSCULAR; INTRAVENOUS ONCE
Refills: 0 | Status: COMPLETED | OUTPATIENT
Start: 2023-12-14 | End: 2023-12-14

## 2023-12-14 RX ADMIN — CEFTRIAXONE 100 MILLIGRAM(S): 500 INJECTION, POWDER, FOR SOLUTION INTRAMUSCULAR; INTRAVENOUS at 22:30

## 2023-12-14 RX ADMIN — SODIUM CHLORIDE 1000 MILLILITER(S): 9 INJECTION, SOLUTION INTRAVENOUS at 19:30

## 2023-12-14 RX ADMIN — INSULIN HUMAN 8 UNIT(S): 100 INJECTION, SOLUTION SUBCUTANEOUS at 20:45

## 2023-12-14 RX ADMIN — SODIUM CHLORIDE 1000 MILLILITER(S): 9 INJECTION, SOLUTION INTRAVENOUS at 20:48

## 2023-12-14 NOTE — ED PROVIDER NOTE - PRO INTERPRETER NEED 2
From: Kalyan Lizarraga  Sent: 4/28/2023 3:29 PM CDT  To: Harsha Arthur Nurse Msg Pool  Subject: Medication Change/Refill    Ok, thank you very much. I do understand that this is a controlled drug so I will do my best to keep them close so I don’t continue to run into this issue again.     Also, to answer my previous question, can Vyvanse be taken with my previous prescription of Escitalopram or will that cause any possible health risk/cancel each other out?  
Okay to take these medications together, I am not too concerned about any interactions at the low doses we are using  
English

## 2023-12-14 NOTE — ED ADULT TRIAGE NOTE - CHIEF COMPLAINT QUOTE
Pt BIB EMS for hyperglycemia. Sent in from clinic for BS of 500. Pt endorses polydipsia. Hx DM 2, on insulin, HTN, and lung CA last chemo 3 weeks ago. No palpitations, CP/SOB.

## 2023-12-14 NOTE — ED PROVIDER NOTE - CARE PLAN
Principal Discharge DX:	Hyperglycemia   1 Principal Discharge DX:	Hyperglycemia  Secondary Diagnosis:	Acute UTI

## 2023-12-14 NOTE — ED PROVIDER NOTE - CLINICAL SUMMARY MEDICAL DECISION MAKING FREE TEXT BOX
Pt suffering from hyperglycemia to ____.  Will r/o DKA with labs.  Started on IVF to correct hypovolemia. Insulin PRN.  Monitor patient's electrolyte (sodium, chloride, bicarb, potassium, mag, phos) and replete as needed. Pt suffering from hyperglycemia to 500.  Will r/o DKA with labs.  Started on IVF to correct hypovolemia. Insulin PRN.  Monitor patient's electrolyte (sodium, chloride, bicarb, potassium, mag, phos) and replete as needed.

## 2023-12-14 NOTE — ED ADULT NURSE NOTE - NSFALLUNIVINTERV_ED_ALL_ED
Bed/Stretcher in lowest position, wheels locked, appropriate side rails in place/Call bell, personal items and telephone in reach/Instruct patient to call for assistance before getting out of bed/chair/stretcher/Non-slip footwear applied when patient is off stretcher/[place] to call system/Physically safe environment - no spills, clutter or unnecessary equipment/Purposeful proactive rounding/Room/bathroom lighting operational, light cord in reach Bed/Stretcher in lowest position, wheels locked, appropriate side rails in place/Call bell, personal items and telephone in reach/Instruct patient to call for assistance before getting out of bed/chair/stretcher/Non-slip footwear applied when patient is off stretcher/[place] to call system/Physically safe environment - no spills, clutter or unnecessary equipment/Purposeful proactive rounding/Room/bathroom lighting operational, light cord in reach

## 2023-12-14 NOTE — ED PROVIDER NOTE - PROGRESS NOTE DETAILS
No evidence of DKA on chemistry. Will r/o UTI with UA, give insulin. UTI present, given ceftriaxone and prescription for cefpodoxime. Patient feeling better, asymptomatic presently, will discharge.

## 2023-12-14 NOTE — ED PROVIDER NOTE - PATIENT PORTAL LINK FT
You can access the FollowMyHealth Patient Portal offered by [facility] by registering at the following website: [email or web address]. By joining [employer]’s FollowMyHealth portal, you will also be able to view your health information using other applications (apps) compatible with our system. You can access the FollowMyHealth Patient Portal offered by [facility] by registering at the following website: [email or web address]. By joining [employer]’s FollowMyHealth portal, you will also be able to view your health information using other applications (apps) compatible with our system.

## 2023-12-14 NOTE — ED PROVIDER NOTE - OBJECTIVE STATEMENT
72 yo F w PMH of DM2 on insulin, lung CA on chemo, HTN, p/w hyperglycemia since yesterday to 500. Today she measured blood glucose in the 400s, sent in by her PMD. She currently has no symptoms. Pt states last time she had elevated blood glucose she had a UTI as well.

## 2023-12-14 NOTE — ED PROVIDER NOTE - NSFOLLOWUPINSTRUCTIONS_ED_ALL_ED_FT
Follow up with your primary medical doctor as soon as possible.    Return to the emergency department if your symptoms worsen or if you develop new symptoms.  If you have any problems with followup, please call the ED Referral Coordinator at [phone].    Hyperglycemia    Hyperglycemia occurs when the glucose (sugar) level in your blood is too high. Symptoms include increased urination, increased thirst, a dry mouth, or changes in appetite. If started on a medication, take exactly as prescribed by your health care professional. Maintain a healthy lifestyle and follow up with your primary care physician.    SEEK IMMEDIATE MEDICAL CARE IF YOU HAVE ANY OF THE FOLLOWING SYMPTOMS: shortness of breath, change in mental status, nausea or vomiting, fruity smell to your breath, or any signs of dehydration. Follow up with your primary medical doctor as soon as possible.    Return to the emergency department if your symptoms worsen or if you develop new symptoms.  If you have any problems with followup, please call the ED Referral Coordinator at [phone].    Hyperglycemia    Hyperglycemia occurs when the glucose (sugar) level in your blood is too high. Symptoms include increased urination, increased thirst, a dry mouth, or changes in appetite. If started on a medication, take exactly as prescribed by your health care professional. Maintain a healthy lifestyle and follow up with your primary care physician.    SEEK IMMEDIATE MEDICAL CARE IF YOU HAVE ANY OF THE FOLLOWING SYMPTOMS: shortness of breath, change in mental status, nausea or vomiting, fruity smell to your breath, or any signs of dehydration. Follow up with your primary medical doctor as soon as possible.  Take your antibiotics as prescribed, complete the entire course of antibiotics.  Return to the emergency department if your symptoms worsen or if you develop new symptoms.  If you have any problems with followup, please call the ED Referral Coordinator at [phone].    Hyperglycemia    Hyperglycemia occurs when the glucose (sugar) level in your blood is too high. Symptoms include increased urination, increased thirst, a dry mouth, or changes in appetite. If started on a medication, take exactly as prescribed by your health care professional. Maintain a healthy lifestyle and follow up with your primary care physician.    SEEK IMMEDIATE MEDICAL CARE IF YOU HAVE ANY OF THE FOLLOWING SYMPTOMS: shortness of breath, change in mental status, nausea or vomiting, fruity smell to your breath, or any signs of dehydration.    Urinary Tract Infection    A urinary tract infection (UTI) is an infection of any part of the urinary tract, which includes the kidneys, ureters, bladder, and urethra. Risk factors include ignoring your need to urinate, wiping back to front if female, being an uncircumcised male, and having diabetes or a weak immune system. Symptoms include frequent urination, pain or burning with urination, foul smelling urine, cloudy urine, pain in the lower abdomen, blood in the urine, and fever. If you were prescribed an antibiotic medicine, take it as told by your health care provider. Do not stop taking the antibiotic even if you start to feel better.    SEEK IMMEDIATE MEDICAL CARE IF YOU HAVE ANY OF THE FOLLOWING SYMPTOMS: severe back or abdominal pain, fever, inability to keep fluids or medicine down, dizziness/lightheadedness, or a change in mental status. Follow up with your primary medical doctor as soon as possible.  Take your antibiotics as prescribed, complete the entire course of antibiotics.  Return to the emergency department if your symptoms worsen or if you develop new symptoms.  If you have any problems with followup, please call the ED Referral Coordinator at [phone].    Hyperglycemia    Hyperglycemia occurs when the glucose (sugar) level in your blood is too high. Symptoms include increased urination, increased thirst, a dry mouth, or changes in appetite. If started on a medication, take exactly as prescribed by your health care professional. Maintain a healthy lifestyle and follow up with your primary care physician.    SEEK IMMEDIATE MEDICAL CARE IF YOU HAVE ANY OF THE FOLLOWING SYMPTOMS: shortness of breath, change in mental status, nausea or vomiting, fruity smell to your breath, or any signs of dehydration.    Urinary Tract Infection    A urinary tract infection (UTI) is an infection of any part of the urinary tract, which includes the kidneys, ureters, bladder, and urethra. Risk factors include ignoring your need to urinate, wiping back to front if female, being an uncircumcised male, and having diabetes or a weak immune system. Symptoms include frequent urination, pain or burning with urination, foul smelling urine, cloudy urine, pain in the lower abdomen, blood in the urine, and fever. If you were prescribed an antibiotic medicine, take it as told by your health care provider. Do not stop taking the antibiotic even if you start to feel better.    SEEK IMMEDIATE MEDICAL CARE IF YOU HAVE ANY OF THE FOLLOWING SYMPTOMS: severe back or abdominal pain, fever, inability to keep fluids or medicine down, dizziness/lightheadedness, or a change in mental status.

## 2023-12-14 NOTE — ED ADULT NURSE NOTE - OBJECTIVE STATEMENT
Pt arrived to ED c/o hyperglycemia. Pt reports her FS has been elevated at home for a while in the 500s. Pt hx of DM2, lung CA. Denies cp, sob, n/v/d Pt arrived to ED c/o hyperglycemia. Pt sent in by PCP for high glucose. Pt reports her FS has been elevated at home for a while in the 500s. Pt c/o burning sensation with urination. Pt hx of DM2, lung CA. Denies cp, sob, n/v/d

## 2023-12-15 ENCOUNTER — NON-APPOINTMENT (OUTPATIENT)
Age: 73
End: 2023-12-15

## 2023-12-15 ENCOUNTER — APPOINTMENT (OUTPATIENT)
Dept: HOME HEALTH SERVICES | Facility: HOME HEALTH | Age: 73
End: 2023-12-15

## 2023-12-15 ENCOUNTER — APPOINTMENT (OUTPATIENT)
Dept: RHEUMATOLOGY | Facility: CLINIC | Age: 73
End: 2023-12-15

## 2023-12-15 RX ORDER — SULFASALAZINE 500 MG/1
500 TABLET ORAL TWICE DAILY
Qty: 120 | Refills: 0 | Status: DISCONTINUED | COMMUNITY
Start: 2023-10-30 | End: 2023-12-15

## 2023-12-16 LAB
-  AMPICILLIN/SULBACTAM: SIGNIFICANT CHANGE UP
-  AMPICILLIN/SULBACTAM: SIGNIFICANT CHANGE UP
-  AMPICILLIN: SIGNIFICANT CHANGE UP
-  AMPICILLIN: SIGNIFICANT CHANGE UP
-  CEFAZOLIN: SIGNIFICANT CHANGE UP
-  CEFAZOLIN: SIGNIFICANT CHANGE UP
-  CEFTRIAXONE: SIGNIFICANT CHANGE UP
-  CEFTRIAXONE: SIGNIFICANT CHANGE UP
-  CIPROFLOXACIN: SIGNIFICANT CHANGE UP
-  CIPROFLOXACIN: SIGNIFICANT CHANGE UP
-  ERTAPENEM: SIGNIFICANT CHANGE UP
-  ERTAPENEM: SIGNIFICANT CHANGE UP
-  GENTAMICIN: SIGNIFICANT CHANGE UP
-  GENTAMICIN: SIGNIFICANT CHANGE UP
-  MEROPENEM: SIGNIFICANT CHANGE UP
-  MEROPENEM: SIGNIFICANT CHANGE UP
-  NITROFURANTOIN: SIGNIFICANT CHANGE UP
-  NITROFURANTOIN: SIGNIFICANT CHANGE UP
-  PIPERACILLIN/TAZOBACTAM: SIGNIFICANT CHANGE UP
-  PIPERACILLIN/TAZOBACTAM: SIGNIFICANT CHANGE UP
-  TOBRAMYCIN: SIGNIFICANT CHANGE UP
-  TOBRAMYCIN: SIGNIFICANT CHANGE UP
-  TRIMETHOPRIM/SULFAMETHOXAZOLE: SIGNIFICANT CHANGE UP
-  TRIMETHOPRIM/SULFAMETHOXAZOLE: SIGNIFICANT CHANGE UP
METHOD TYPE: SIGNIFICANT CHANGE UP
METHOD TYPE: SIGNIFICANT CHANGE UP

## 2023-12-17 LAB
-  AMIKACIN: SIGNIFICANT CHANGE UP
-  AMIKACIN: SIGNIFICANT CHANGE UP
CULTURE RESULTS: ABNORMAL
CULTURE RESULTS: ABNORMAL
METHOD TYPE: SIGNIFICANT CHANGE UP
METHOD TYPE: SIGNIFICANT CHANGE UP
ORGANISM # SPEC MICROSCOPIC CNT: ABNORMAL
ORGANISM # SPEC MICROSCOPIC CNT: SIGNIFICANT CHANGE UP
ORGANISM # SPEC MICROSCOPIC CNT: SIGNIFICANT CHANGE UP
SPECIMEN SOURCE: SIGNIFICANT CHANGE UP
SPECIMEN SOURCE: SIGNIFICANT CHANGE UP

## 2023-12-18 ENCOUNTER — APPOINTMENT (OUTPATIENT)
Dept: HOME HEALTH SERVICES | Facility: HOME HEALTH | Age: 73
End: 2023-12-18

## 2023-12-18 LAB
MAGNESIUM SERPL-MCNC: 1.5 MG/DL
TSH SERPL-ACNC: 0.65 UIU/ML

## 2023-12-18 NOTE — HISTORY OF PRESENT ILLNESS
[Disease: _____________________] : Disease: [unfilled] [100: Normal, no complaints, no evidence of disease.] : 100: Normal, no complaints, no evidence of disease. [ECOG Performance Status: 0 - Fully active, able to carry on all pre-disease performance without restriction] : Performance Status: 0 - Fully active, able to carry on all pre-disease performance without restriction [de-identified] : Noemi Minor is a 73-year-old female who presents to the clinic for follow up of NSCLC with BMs.  Onc Hx: Ex-smoker with history of CKD, baseline creatinine is around 2, diabetes, rheumatoid arthritis, HTN. She was experiencing 1 week of daily falls with lower extremity weakness without dizziness, was admitted to [facility]. MRI brain showed 0.9 cm right frontal lobe mass with surrounding vasogenic edema. CT chest abdomen pelvis revealed left upper lobe mass 2.6 x 4.1 cm in posterior aspect of left upper lobe abutting major fissure.  The mass extends to adjacent left hilum and laterally to left lateral pleura.  1.5 cm left lobe of liver lesion.  1.2 cm pancreatic body cystic lesion. 7/14/2023 bronchoscopy-lingular biopsy poorly differentiated adenocarcinoma, positive TTF-1, TMB 17.3, PD-L1 negative. Tier II: Variants of Potential Clinical Significance STK11 p.([id number]) PIK3CA p.([id number]) TP53 p.([id number]) Tier III: Variants of Unknown Clinical Significance ALK p.([id number]) 8/2023: She followed up at [facility] [place] [facility] where she received her first chemotherapy cycle, Taxol/pembrolizumab only due to carboplatin shortage. Taxol was given because her labs there showed a GFR<45, excluding her from being able to receive pemetrexed. She tolerated treatment reasonably well, without any neuropathy or cytopenias. She lost he hair after first chemotherapy cycle. 9/5/2023: PET at [facility]: Left upper lobe perihilar hypermetabolic mass consistent with biopsy-proven malignancy.  Mildly FDG avid right thyroid nodule, correlate with thyroid ultrasound. 9/7/2023: Received cycle 1 paclitaxel/pembrolizumab at [facility] (Cr 1.2 there) 10/10/2023: MRIB: Since 7/11/2023, right posterior frontal enhancing lesion and vasogenic edema are completely resolved as a favorable response to therapy. No new enhancing lesion.  11/16/23 onc clinic  74 y/o f with h/o NSCLC w/BM is here for follow up and tx. Patient reports of feeling well. No ac complaints at this time. Patient's tx was held due to hyperglycemia 478, mildly elevated Lfts that were not her baseline and hyponatremia. Patient does report of eating ice cream/ cake the night before and found eating an orange this morning.   11/22/23 onc clinic  74 y/o f with NSCLC w/BM returns to clinic for f/u and tx. Patient states last week she was sent to the ED and her hyperglycemia was medically managed. She was also tx for a UTI. She reports of feeling well and has no new complaints.   12/18/23  onc clinic  74 y/o f with NSCLC with mets. present today for glucose check. Patient reports of feeling frequency in urination. Denies fevers, n,v, abd pain, dysuria or hematuria.   [de-identified] : Adenocarcinoma [de-identified] : Medonc ([facility]): Dr.Rosa Jeffrey NeuroSx: Dr.D'[facility]ico [facility]: Dr. Wernicke [FreeTextEntry1] : 9/7/2023: Taxol/pembrolizumab C1 given at [place] (no carboplatin due to national shortage) 10/26/2023: C2 carboplatin/Taxol/pembrolizumab 11/16/23 C3 carboplatin/taxol/pembro (held due to hyperglycemia, elevated lfts and hyponatremia) 11/22/23  C3 carboplatin/taxol/pembro 12/13/23 C4 held due to hyperglycemia.  [de-identified] : Feeling ok. Offers no complaints. No irAE or any side effect to treatment last week.

## 2023-12-18 NOTE — REVIEW OF SYSTEMS
[Diarrhea: Grade 0] : Diarrhea: Grade 0 [Negative] : Allergic/Immunologic [Fever] : no fever [Abdominal Pain] : no abdominal pain [Vomiting] : no vomiting [Dysuria] : no dysuria [FreeTextEntry8] : Frequency

## 2023-12-18 NOTE — ASSESSMENT
[FreeTextEntry1] : 72 yo F with NSCLC with BMs and liver met (STK11 mutant, PIK3CA mutant, TP53 mutant, PD-L1 negative).  NSCLC - BMs s/p SRS, liver met. Received C1 paclitaxel/pembrolizumab on 9/7/2023 at [facility]. This regimen was favored over carbo/pemetrexed/pembrolizumab due to CKD and baseline GFR less than 45. C2 carbo/Taxol/pembro on 10/26/23 Dose reduced Taxol by 25%, carboplatin calculated dose for Cr of 1.3 We will monitor for another rheumatoid arthritis flare, sent rx for prednisone 40 mg daily just in case she devlopes an RA flare. Patient advised not to take it unless RA flare. She has Zofran, Immodium and dexamethasone at home. She will be due for scans after cycle 3. RTC with C3. All questions answered in detail.  Rheumatoid arthritis Being managed by Dr. Johnston  CKD Monitor  11/16/23 onc clinic  a/p NSCLC w/ BM  NAD, well appearing and VSS Labs: hyperglycemia 478, elevated lfts not at baseline and hyponatremia  Tx held C3, sent to ED for medical management.  R/s appt for 11/22/23 Pt was advised to not binge on sweets or day of tx.  C/w meds prior to chemo and while on chemo RTC 3wks for f/u and tx  .Encourage to contact the office for any concerns or worsening symptoms. Pt verbalizes understanding.  11/22/23 onc clinic a/p NSCLC w/BM Well appearing, NAD and VSS Labs: cbc wnl, CMP elevated lfts and mildly elevated creatinine Dose adjusted for taxol C/w with dexamethasone prior to chemo and day of Hold prednisone night before chemo and day of while taking dexa  c/w folic acid RTC in 3wks .Encourage to contact the office for any concerns or worsening symptoms. Pt verbalizes understanding   12/14/23 onc clinic  a/p NSCLC w/ mets  NAD and VSS Labs: negative anion gap, Glucose 571, creatnine 1.40  Patient with urinary frequency and hyperglycemia. Pt was sent to ED for hyperglycemia medical management.  Will hold C4 due to hyperglycemia. RTC upon ED dc for follow up.  .Encourage to contact the office for any concerns or worsening symptoms. Pt verbalizes understanding

## 2023-12-19 ENCOUNTER — TRANSCRIPTION ENCOUNTER (OUTPATIENT)
Age: 73
End: 2023-12-19

## 2023-12-19 ENCOUNTER — APPOINTMENT (OUTPATIENT)
Dept: HOME HEALTH SERVICES | Facility: HOME HEALTH | Age: 73
End: 2023-12-19
Payer: MEDICARE

## 2023-12-19 VITALS
HEART RATE: 78 BPM | RESPIRATION RATE: 20 BRPM | DIASTOLIC BLOOD PRESSURE: 68 MMHG | SYSTOLIC BLOOD PRESSURE: 132 MMHG | TEMPERATURE: 98.3 F | OXYGEN SATURATION: 96 % | HEIGHT: 63 IN

## 2023-12-19 PROCEDURE — 99349 HOME/RES VST EST MOD MDM 40: CPT

## 2023-12-19 RX ORDER — CEFPODOXIME PROXETIL 100 MG/1
100 TABLET, FILM COATED ORAL
Qty: 14 | Refills: 0 | Status: ACTIVE | COMMUNITY
Start: 2023-12-19

## 2023-12-19 NOTE — REVIEW OF SYSTEMS
[Fever] : no fever [Abdominal Pain] : no abdominal pain [Vomiting] : no vomiting [Diarrhea: Grade 0] : Diarrhea: Grade 0 [Dysuria] : no dysuria [Negative] : Allergic/Immunologic [FreeTextEntry8] : Frequency

## 2023-12-19 NOTE — REASON FOR VISIT
[Follow-Up] : a follow-up visit [Post ER] : a Post ER visit [Other: _____] : [unfilled] [Pre-Visit Preparation] : pre-visit preparation was done [Intercurrent Specialty/Sub-specialty Visits] : the patient has intercurrent specialty/sub-specialty visits [FreeTextEntry1] : DM2 on insulin, Right hip joint replacement, herniated disc, HLD, HTN, Lung CA metastatic to brain on chemo therapy,   Osteoarthritis, obesity, CKD stage 3, Osteopenia, osteoporosis, Rheumatoid arthritis, venous insufficiency, osteoporosis, UTI [FreeTextEntry2] : reviewed chart [FreeTextEntry3] : cardiology, endocrinology, oncology

## 2023-12-19 NOTE — CHRONIC CARE ASSESSMENT
[Less than 3 Times Per Week] : exercises less than 3 times per week [> 30 Minutes/Session] : (> 30 minutes per session) [General Adherence] : and is generally adherent [Inadequate social support] : inadequate social support [Walking] : walking [Diabetic Diet] : diabetic [Low Fat Diet] : low fat [Low Carb Diet] : low carbohydrate [Low Salt Diet] : low salt [de-identified] : Lung CA metastatic to brain and on chemo therapy [de-identified] : walks as tolerated [de-identified] : low sodium, low fat, low carb, diabetic diet [PPS Score: ____] : Palliative Performance Scale (PPS) Score: [unfilled]

## 2023-12-19 NOTE — HISTORY OF PRESENT ILLNESS
[Patient] : patient [House Calls Co-Management Patient] : [unfilled] is a House Calls co-management patient [PT] : PT [Patient is stable] : patient is stable [Education provided] : education provided [LastPCPVisitDate] : 11/23 [FreeTextEntry4] : ONCOLOGY, CARDIOLOGY, endocrinology, RHEUMATOLOGY [FreeTextEntry1] : Not homebound [FreeTextEntry2] : 12/19/2023 COVID SCREEN: Patient or caretaker denies fever, cough, trouble breathing, rash, vomiting. Patient has not been in close contact with anyone who is COVID-19 positive, or suspected of having COVID-19.  N95 mask, gloves, eye wear and gown (if indicated) used during visit: Yes.  Total face to face time with patient is 45 min.  MICHAEL READ is an 73 year with DM2 on insulin, Right hip joint replacement, herniated disc, HLD, HTN, Lung CA metastatic to brain on chemo therapy,  Osteoarthritis, obesity, CKD stage 3, Osteopenia, osteoporosis, Rheumatoid arthritis, venous insufficiency, osteoporosis, UTI seen today for follow up post ED to hospital   Patient alone during the visit.  Patient's last ER visit on 12/14/23. Patient reports during visit to the oncology clinic, she was found to be hyperglycemia with FS greater than 500. She was refer to go to hospital. Patient reports  she was from the  clinic to [facility] ER on 12/14/23. Hospital  diagnosis with UTI  from the ER discharge documents. Patient was discharge home on same day 12/14/23. Patient was discharged home with antibiotic Cefpodoxime 100mg tab oral every 12hrs for 7days.  since last ER visit, patient reports started with virginal itchiness 3days ago. She reports using diclofenac cream in the virginal and started with metrorrhagia for 2days. Patient is post menopausal. Patient denies fever, chest pain, dizziness, fatigue, palpitation, distress, dysuria, frequency, abdominal pain, n/v.   Medication reconciled with changes Cefpodoxime 100mg tab oral every 12hrs for 7days. Interim: Metrorrhagia x2days after using diclofenac cream in the private. Patient refused an emergent care.    Patient/ patient's caregiver reports no weight loss >10 lbs in the past 6 months. No changes in dentition or swallowing reported, No changes in hearing or vision reported. No changes in Cognition reported. Patient denies any symptoms of depression or anxiety. Patient is ADL independent/dependent and IADL independent/dependent. No changes in gait or falls reported.  Patient's home environment is safe.   Goals of care discussed 10min.  Full Code with no limitations

## 2023-12-19 NOTE — HISTORY OF PRESENT ILLNESS
[Disease: _____________________] : Disease: [unfilled] [de-identified] : Noemi Minor is a 73-year-old female who presents to the clinic for follow up of NSCLC with BMs.  Onc Hx: Ex-smoker with history of CKD, baseline creatinine is around 2, diabetes, rheumatoid arthritis, HTN. She was experiencing 1 week of daily falls with lower extremity weakness without dizziness, was admitted to [facility]. MRI brain showed 0.9 cm right frontal lobe mass with surrounding vasogenic edema. CT chest abdomen pelvis revealed left upper lobe mass 2.6 x 4.1 cm in posterior aspect of left upper lobe abutting major fissure.  The mass extends to adjacent left hilum and laterally to left lateral pleura.  1.5 cm left lobe of liver lesion.  1.2 cm pancreatic body cystic lesion. 7/14/2023 bronchoscopy-lingular biopsy poorly differentiated adenocarcinoma, positive TTF-1, TMB 17.3, PD-L1 negative. Tier II: Variants of Potential Clinical Significance STK11 p.([id number]) PIK3CA p.([id number]) TP53 p.([id number]) Tier III: Variants of Unknown Clinical Significance ALK p.([id number]) 8/2023: She followed up at [facility] where she received her first chemotherapy cycle, Taxol/pembrolizumab only due to carboplatin shortage. Taxol was given because her labs there showed a GFR<45, excluding her from being able to receive pemetrexed. She tolerated treatment reasonably well, without any neuropathy or cytopenias. She lost he hair after first chemotherapy cycle. 9/5/2023: PET at [facility]: Left upper lobe perihilar hypermetabolic mass consistent with biopsy-proven malignancy.  Mildly FDG avid right thyroid nodule, correlate with thyroid ultrasound. 9/7/2023: Received cycle 1 paclitaxel/pembrolizumab at [facility] (Cr 1.2 there) 10/10/2023: [facility]B: Since 7/11/2023, right posterior frontal enhancing lesion and vasogenic edema are completely resolved as a favorable response to therapy. No new enhancing lesion.  11/16/23 onc clinic  74 y/o f with h/o NSCLC w/BM is here for follow up and tx. Patient reports of feeling well. No ac complaints at this time. Patient's tx was held due to hyperglycemia 478, mildly elevated Lfts that were not her baseline and hyponatremia. Patient does report of eating ice cream/ cake the night before and found eating an orange this morning.   11/22/23 onc clinic  74 y/o f with NSCLC w/BM returns to clinic for f/u and tx. Patient states last week she was sent to the ED and her hyperglycemia was medically managed. She was also tx for a UTI. She reports of feeling well and has no new complaints.   12/18/23  onc clinic  74 y/o f with NSCLC with mets. present today for glucose check. Patient reports of feeling frequency in urination. Denies fevers, n,v, abd pain, dysuria or hematuria.   [de-identified] : Adenocarcinoma [de-identified] : Medonc ([facility]): Dr.Rosa Jeffrey NeuroSx: Dr.[facility]: Dr. Wernicke [FreeTextEntry1] : 9/7/2023: Taxol/pembrolizumab C1 given at [facility] (no carboplatin due to national shortage) 10/26/2023: C2 carboplatin/Taxol/pembrolizumab 11/16/23 C3 carboplatin/taxol/pembro (held due to hyperglycemia, elevated lfts and hyponatremia) 11/22/23  C3 carboplatin/taxol/pembro 12/13/23 C4 held due to hyperglycemia.  [de-identified] : Feeling ok. Offers no complaints. No irAE or any side effect to treatment last week. [100: Normal, no complaints, no evidence of disease.] : 100: Normal, no complaints, no evidence of disease. [ECOG Performance Status: 0 - Fully active, able to carry on all pre-disease performance without restriction] : Performance Status: 0 - Fully active, able to carry on all pre-disease performance without restriction

## 2023-12-19 NOTE — COUNSELING
[] : foot exam [Completed] : Aspirin use discussion completed [Completed Healthcare Proxy] : completed healthcare proxy [Completed Medical Orders for Life-Sustaining Treatment] : completed medical orders for life-sustaining treatment [No Limitations] : Treatment Guidelines: No limitations [Long Term Intubation] : Intubation: Long term intubation [[facility] Ver[facility] Date: _____] [facility]last [facility]: [unfilled] [_____] : HCP: [unfilled] [Overweight - ( BMI 25.1 - 29.9 )] : overweight -  ( BMI 25.1 - 29.9 ) [DASH diet recommended] : DASH diet recommended [Sodium restriction 2gm recommended] : sodium restriction 2 gm recommended [Non - Smoker] : non-smoker [Smoke/CO Detectors] : smoke/CO detectors [Use grab bars] : use grab bars [Use assistive device to avoid falls] : use assistive device to avoid falls [Remove clutter and unsafe carpeting to avoid falls] : remove clutter and unsafe carpeting to avoid falls [Patient not on disease-modifying anti-rheumatic drug due to overall prognosis] : Patient not on disease-modifying anti-rheumatic drug due to overall prognosis [Decrease hospital use] : decrease hospital use [Minimize unnecessary interventions] : minimize unnecessary interventions [Discussed disease trajectory with patient/caregiver] : discussed disease trajectory with patient/caregiver [Advanced Directives discussed: ____] : Advanced directives discussed: [unfilled] [Full Code] : Code Status: Full Code

## 2023-12-19 NOTE — PHYSICAL EXAM
[Normal Bowel Sounds] : normal bowel sounds [Non Tender] : non-tender [Soft] : abdomen soft [Not Distended] : not distended [Patient Refused] : rectal exam was refused by the patient [No Acute Distress] : no acute distress [Normal Voice/Communication] : normal voice communication [Normal Sclera/Conjunctiva] : normal sclera/conjunctiva [EOMI] : extra ocular movement intact [Normal Outer Ear/Nose] : the ears and nose were normal in appearance [Normal TMs] : both tympanic membranes were normal [No JVD] : no jugular venous distention [No LAD] : no lymphadenopathy [No Respiratory Distress] : no respiratory distress [Clear to Auscultation] : lungs were clear to auscultation bilaterally [No Accessory Muscle Use] : no accessory muscle use [Normal Rate] : heart rate was normal  [Regular Rhythm] : with a regular rhythm [Normal S1, S2] : normal S1 and S2 [No Murmurs] : no murmurs heard [No Edema] : there was no peripheral edema [Breast Exam Declined] : patient declined to have breast exam done [Normal Post Cervical Nodes] : no posterior cervical lymphadenopathy [Normal Anterior Cervical Nodes] : no anterior cervical lymphadenopathy [No CVA Tenderness] : no ~M costovertebral angle tenderness [No Joint Swelling] : no joint swelling seen [No Rash] : no rash [No Skin Lesions] : no skin lesions [Cranial Nerves Intact] : cranial nerves 2-12 were intact [Normal Reflexes] : deep tendon reflexes were 2+ and symmetric [Oriented x3] : oriented to person, place, and time [Over the Past 2 Weeks, Have You Felt Down, Depressed, or Hopeless?] : 1.) Over the past 2 weeks, have you felt down, depressed, or hopeless? No [Over the Past 2 Weeks, Have You Felt Little Interest or Pleasure Doing Things?] : 2.) Over the past 2 weeks, have you felt little interest or pleasure doing things? No [Foot Ulcers] : no foot ulcers [de-identified] : virginal bleeding, perineal itchiness. [de-identified] : unsteady gait

## 2023-12-19 NOTE — ASSESSMENT
[FreeTextEntry1] : 74 yo F with NSCLC with BMs and liver met (STK11 mutant, PIK3CA mutant, TP53 mutant, PD-L1 negative).  NSCLC - BMs s/p SRS, liver met. Received C1 paclitaxel/pembrolizumab [place] 9/7/2023 [place] [facility] This regimen was favored over carbo/pemetrexed/pembrolizumab due to CKD and baseline GFR less than 45. C2 carbo/Taxol/pembro on 10/26/23 Dose reduced Taxol by 25%, carboplatin calculated dose for Cr of 1.3 We will monitor for another rheumatoid arthritis flare, sent rx for prednisone 40 mg daily just in case she devlopes an RA flare. Patient advised not to take it unless RA flare. She has Zofran, Immodium and dexamethasone at home. She will be due for scans after cycle 3. RTC with C3. All questions answered in detail.  Rheumatoid arthritis Being managed by Dr. Johnston  CKD Monitor  11/16/23 onc clinic  a/p NSCLC w/ BM  NAD, well appearing and VSS Labs: hyperglycemia 478, elevated lfts not at baseline and hyponatremia  Tx held C3, sent to ED for medical management.  R/s appt for 11/22/23 Pt was advised to not binge on sweets or day of tx.  C/w meds prior to chemo and while on chemo RTC 3wks for f/u and tx  .Encourage to contact the office for any concerns or worsening symptoms. Pt verbalizes understanding.  11/22/23 onc clinic a/p NSCLC w/BM Well appearing, NAD and VSS Labs: cbc wnl, CMP elevated lfts and mildly elevated creatinine Dose adjusted for taxol C/w with dexamethasone prior to chemo and day of Hold prednisone night before chemo and day of while taking dexa  c/w folic acid RTC in 3wks .Encourage to contact the office for any concerns or worsening symptoms. Pt verbalizes understanding   12/14/23 onc clinic  a/p NSCLC w/ mets  NAD and VSS Labs: negative anion gap, Glucose 571, creatnine 1.40  Patient with urinary frequency and hyperglycemia. Pt was sent to ED for hyperglycemia medical management.  Will hold C4 due to hyperglycemia. RTC upon ED dc for follow up.  .Encourage to contact the office for any concerns or worsening symptoms. Pt verbalizes understanding

## 2023-12-19 NOTE — REVIEW OF SYSTEMS
[Dysuria] : no dysuria [Incontinence] : no incontinence [Nocturia] : no nocturia [Poor Libido] : libido not poor [Hematuria] : hematuria [Frequency] : no frequency [Vaginal Discharge] : vaginal discharge [Dysmenorrhea] : no dysmenorrhea [Joint Pain] : joint pain [Joint Stiffness] : no joint stiffness [Joint Swelling] : no joint swelling [Muscle Weakness] : no muscle weakness [Muscle Pain] : no muscle pain [Back Pain] : no back pain [Headache] : no headache [Dizziness] : no dizziness [Fainting] : no fainting [Confusion] : no confusion [Memory Loss] : no memory loss [Unsteady Walking] : ataxia [Easy Bleeding] : easy bleeding [Easy Bruising] : no easy bruising [Swollen Glands] : no swollen glands [Negative] : Psychiatric [FreeTextEntry8] : recurrent UTI and on antibiotic, virginal bleeding x2days with UTI [FreeTextEntry9] : OA with joint pain [de-identified] : using rollator in home and outside [de-identified] : virginal bleeding x2days

## 2023-12-19 NOTE — HEALTH RISK ASSESSMENT
[Independent] : managing finances [Some assistance needed] : doing laundry [Two or more falls in past year] : Patient reported two or more falls in the past year [Yes] : The patient does have visual impairment [HRA Reviewed] : Health risk assessment reviewed [FreeTextEntry8] : using rollator in the home and outside [TimeGetUpGo] : 4 [de-identified] : use rollator in and outside as assistive device.

## 2023-12-21 ENCOUNTER — NON-APPOINTMENT (OUTPATIENT)
Age: 73
End: 2023-12-21

## 2023-12-25 ENCOUNTER — NON-APPOINTMENT (OUTPATIENT)
Age: 73
End: 2023-12-25

## 2023-12-26 ENCOUNTER — INPATIENT (INPATIENT)
Facility: HOSPITAL | Age: 73
LOS: 7 days | Discharge: HOME HEALTH | DRG: 637 | End: 2024-01-03
Attending: INTERNAL MEDICINE | Admitting: STUDENT IN AN ORGANIZED HEALTH CARE EDUCATION/TRAINING PROGRAM
Payer: MEDICARE

## 2023-12-26 VITALS
SYSTOLIC BLOOD PRESSURE: 174 MMHG | OXYGEN SATURATION: 96 % | WEIGHT: 136.91 LBS | HEIGHT: 63 IN | TEMPERATURE: 97 F | HEART RATE: 74 BPM | DIASTOLIC BLOOD PRESSURE: 107 MMHG | RESPIRATION RATE: 16 BRPM

## 2023-12-26 DIAGNOSIS — Z96.641 PRESENCE OF RIGHT ARTIFICIAL HIP JOINT: Chronic | ICD-10-CM

## 2023-12-26 LAB
ALBUMIN SERPL ELPH-MCNC: 3.9 G/DL — SIGNIFICANT CHANGE UP (ref 3.3–5)
ALBUMIN SERPL ELPH-MCNC: 3.9 G/DL — SIGNIFICANT CHANGE UP (ref 3.3–5)
ALP SERPL-CCNC: 155 U/L — HIGH (ref 40–120)
ALP SERPL-CCNC: 155 U/L — HIGH (ref 40–120)
ALT FLD-CCNC: 16 U/L — SIGNIFICANT CHANGE UP (ref 10–45)
ALT FLD-CCNC: 16 U/L — SIGNIFICANT CHANGE UP (ref 10–45)
ANION GAP SERPL CALC-SCNC: 11 MMOL/L — SIGNIFICANT CHANGE UP (ref 5–17)
ANION GAP SERPL CALC-SCNC: 11 MMOL/L — SIGNIFICANT CHANGE UP (ref 5–17)
APPEARANCE UR: CLEAR — SIGNIFICANT CHANGE UP
APPEARANCE UR: CLEAR — SIGNIFICANT CHANGE UP
AST SERPL-CCNC: 16 U/L — SIGNIFICANT CHANGE UP (ref 10–40)
AST SERPL-CCNC: 16 U/L — SIGNIFICANT CHANGE UP (ref 10–40)
B-OH-BUTYR SERPL-SCNC: 0.1 MMOL/L — SIGNIFICANT CHANGE UP
B-OH-BUTYR SERPL-SCNC: 0.1 MMOL/L — SIGNIFICANT CHANGE UP
BACTERIA # UR AUTO: ABNORMAL /HPF
BACTERIA # UR AUTO: ABNORMAL /HPF
BASE EXCESS BLDV CALC-SCNC: 4.2 MMOL/L — HIGH (ref -2–3)
BASE EXCESS BLDV CALC-SCNC: 4.2 MMOL/L — HIGH (ref -2–3)
BASOPHILS # BLD AUTO: 0.04 K/UL — SIGNIFICANT CHANGE UP (ref 0–0.2)
BASOPHILS # BLD AUTO: 0.04 K/UL — SIGNIFICANT CHANGE UP (ref 0–0.2)
BASOPHILS NFR BLD AUTO: 0.4 % — SIGNIFICANT CHANGE UP (ref 0–2)
BASOPHILS NFR BLD AUTO: 0.4 % — SIGNIFICANT CHANGE UP (ref 0–2)
BILIRUB SERPL-MCNC: 0.4 MG/DL — SIGNIFICANT CHANGE UP (ref 0.2–1.2)
BILIRUB SERPL-MCNC: 0.4 MG/DL — SIGNIFICANT CHANGE UP (ref 0.2–1.2)
BILIRUB UR-MCNC: NEGATIVE — SIGNIFICANT CHANGE UP
BILIRUB UR-MCNC: NEGATIVE — SIGNIFICANT CHANGE UP
BUN SERPL-MCNC: 30 MG/DL — HIGH (ref 7–23)
BUN SERPL-MCNC: 30 MG/DL — HIGH (ref 7–23)
CA-I SERPL-SCNC: 1.29 MMOL/L — SIGNIFICANT CHANGE UP (ref 1.15–1.33)
CA-I SERPL-SCNC: 1.29 MMOL/L — SIGNIFICANT CHANGE UP (ref 1.15–1.33)
CALCIUM SERPL-MCNC: 9.7 MG/DL — SIGNIFICANT CHANGE UP (ref 8.4–10.5)
CALCIUM SERPL-MCNC: 9.7 MG/DL — SIGNIFICANT CHANGE UP (ref 8.4–10.5)
CAST: 1 /LPF — SIGNIFICANT CHANGE UP (ref 0–4)
CAST: 1 /LPF — SIGNIFICANT CHANGE UP (ref 0–4)
CHLORIDE SERPL-SCNC: 96 MMOL/L — SIGNIFICANT CHANGE UP (ref 96–108)
CHLORIDE SERPL-SCNC: 96 MMOL/L — SIGNIFICANT CHANGE UP (ref 96–108)
CO2 BLDV-SCNC: 32.8 MMOL/L — HIGH (ref 22–26)
CO2 BLDV-SCNC: 32.8 MMOL/L — HIGH (ref 22–26)
CO2 SERPL-SCNC: 25 MMOL/L — SIGNIFICANT CHANGE UP (ref 22–31)
CO2 SERPL-SCNC: 25 MMOL/L — SIGNIFICANT CHANGE UP (ref 22–31)
COLOR SPEC: YELLOW — SIGNIFICANT CHANGE UP
COLOR SPEC: YELLOW — SIGNIFICANT CHANGE UP
CREAT SERPL-MCNC: 1.23 MG/DL — SIGNIFICANT CHANGE UP (ref 0.5–1.3)
CREAT SERPL-MCNC: 1.23 MG/DL — SIGNIFICANT CHANGE UP (ref 0.5–1.3)
DIFF PNL FLD: NEGATIVE — SIGNIFICANT CHANGE UP
DIFF PNL FLD: NEGATIVE — SIGNIFICANT CHANGE UP
EGFR: 46 ML/MIN/1.73M2 — LOW
EGFR: 46 ML/MIN/1.73M2 — LOW
EOSINOPHIL # BLD AUTO: 0 K/UL — SIGNIFICANT CHANGE UP (ref 0–0.5)
EOSINOPHIL # BLD AUTO: 0 K/UL — SIGNIFICANT CHANGE UP (ref 0–0.5)
EOSINOPHIL NFR BLD AUTO: 0 % — SIGNIFICANT CHANGE UP (ref 0–6)
EOSINOPHIL NFR BLD AUTO: 0 % — SIGNIFICANT CHANGE UP (ref 0–6)
FLUAV AG NPH QL: DETECTED
FLUAV AG NPH QL: DETECTED
FLUBV AG NPH QL: SIGNIFICANT CHANGE UP
FLUBV AG NPH QL: SIGNIFICANT CHANGE UP
GAS PNL BLDV: 131 MMOL/L — LOW (ref 136–145)
GAS PNL BLDV: 131 MMOL/L — LOW (ref 136–145)
GAS PNL BLDV: SIGNIFICANT CHANGE UP
GAS PNL BLDV: SIGNIFICANT CHANGE UP
GLUCOSE SERPL-MCNC: 403 MG/DL — HIGH (ref 70–99)
GLUCOSE SERPL-MCNC: 403 MG/DL — HIGH (ref 70–99)
GLUCOSE UR QL: >=1000 MG/DL
GLUCOSE UR QL: >=1000 MG/DL
HCO3 BLDV-SCNC: 31 MMOL/L — HIGH (ref 22–29)
HCO3 BLDV-SCNC: 31 MMOL/L — HIGH (ref 22–29)
HCT VFR BLD CALC: 36.4 % — SIGNIFICANT CHANGE UP (ref 34.5–45)
HCT VFR BLD CALC: 36.4 % — SIGNIFICANT CHANGE UP (ref 34.5–45)
HGB BLD-MCNC: 11.6 G/DL — SIGNIFICANT CHANGE UP (ref 11.5–15.5)
HGB BLD-MCNC: 11.6 G/DL — SIGNIFICANT CHANGE UP (ref 11.5–15.5)
IMM GRANULOCYTES NFR BLD AUTO: 2 % — HIGH (ref 0–0.9)
IMM GRANULOCYTES NFR BLD AUTO: 2 % — HIGH (ref 0–0.9)
KETONES UR-MCNC: NEGATIVE MG/DL — SIGNIFICANT CHANGE UP
KETONES UR-MCNC: NEGATIVE MG/DL — SIGNIFICANT CHANGE UP
LEUKOCYTE ESTERASE UR-ACNC: ABNORMAL
LEUKOCYTE ESTERASE UR-ACNC: ABNORMAL
LYMPHOCYTES # BLD AUTO: 1.88 K/UL — SIGNIFICANT CHANGE UP (ref 1–3.3)
LYMPHOCYTES # BLD AUTO: 1.88 K/UL — SIGNIFICANT CHANGE UP (ref 1–3.3)
LYMPHOCYTES # BLD AUTO: 18.6 % — SIGNIFICANT CHANGE UP (ref 13–44)
LYMPHOCYTES # BLD AUTO: 18.6 % — SIGNIFICANT CHANGE UP (ref 13–44)
MAGNESIUM SERPL-MCNC: 1.6 MG/DL — SIGNIFICANT CHANGE UP (ref 1.6–2.6)
MAGNESIUM SERPL-MCNC: 1.6 MG/DL — SIGNIFICANT CHANGE UP (ref 1.6–2.6)
MCHC RBC-ENTMCNC: 29.1 PG — SIGNIFICANT CHANGE UP (ref 27–34)
MCHC RBC-ENTMCNC: 29.1 PG — SIGNIFICANT CHANGE UP (ref 27–34)
MCHC RBC-ENTMCNC: 31.9 GM/DL — LOW (ref 32–36)
MCHC RBC-ENTMCNC: 31.9 GM/DL — LOW (ref 32–36)
MCV RBC AUTO: 91.2 FL — SIGNIFICANT CHANGE UP (ref 80–100)
MCV RBC AUTO: 91.2 FL — SIGNIFICANT CHANGE UP (ref 80–100)
MONOCYTES # BLD AUTO: 0.69 K/UL — SIGNIFICANT CHANGE UP (ref 0–0.9)
MONOCYTES # BLD AUTO: 0.69 K/UL — SIGNIFICANT CHANGE UP (ref 0–0.9)
MONOCYTES NFR BLD AUTO: 6.8 % — SIGNIFICANT CHANGE UP (ref 2–14)
MONOCYTES NFR BLD AUTO: 6.8 % — SIGNIFICANT CHANGE UP (ref 2–14)
NEUTROPHILS # BLD AUTO: 7.32 K/UL — SIGNIFICANT CHANGE UP (ref 1.8–7.4)
NEUTROPHILS # BLD AUTO: 7.32 K/UL — SIGNIFICANT CHANGE UP (ref 1.8–7.4)
NEUTROPHILS NFR BLD AUTO: 72.2 % — SIGNIFICANT CHANGE UP (ref 43–77)
NEUTROPHILS NFR BLD AUTO: 72.2 % — SIGNIFICANT CHANGE UP (ref 43–77)
NITRITE UR-MCNC: POSITIVE
NITRITE UR-MCNC: POSITIVE
NRBC # BLD: 0 /100 WBCS — SIGNIFICANT CHANGE UP (ref 0–0)
NRBC # BLD: 0 /100 WBCS — SIGNIFICANT CHANGE UP (ref 0–0)
PCO2 BLDV: 55 MMHG — HIGH (ref 39–42)
PCO2 BLDV: 55 MMHG — HIGH (ref 39–42)
PH BLDV: 7.36 — SIGNIFICANT CHANGE UP (ref 7.32–7.43)
PH BLDV: 7.36 — SIGNIFICANT CHANGE UP (ref 7.32–7.43)
PH UR: 5.5 — SIGNIFICANT CHANGE UP (ref 5–8)
PH UR: 5.5 — SIGNIFICANT CHANGE UP (ref 5–8)
PHOSPHATE SERPL-MCNC: 2.2 MG/DL — LOW (ref 2.5–4.5)
PHOSPHATE SERPL-MCNC: 2.2 MG/DL — LOW (ref 2.5–4.5)
PLATELET # BLD AUTO: 184 K/UL — SIGNIFICANT CHANGE UP (ref 150–400)
PLATELET # BLD AUTO: 184 K/UL — SIGNIFICANT CHANGE UP (ref 150–400)
PO2 BLDV: <33 MMHG — SIGNIFICANT CHANGE UP (ref 25–45)
PO2 BLDV: <33 MMHG — SIGNIFICANT CHANGE UP (ref 25–45)
POTASSIUM BLDV-SCNC: 4.1 MMOL/L — SIGNIFICANT CHANGE UP (ref 3.5–5.1)
POTASSIUM BLDV-SCNC: 4.1 MMOL/L — SIGNIFICANT CHANGE UP (ref 3.5–5.1)
POTASSIUM SERPL-MCNC: 4.1 MMOL/L — SIGNIFICANT CHANGE UP (ref 3.5–5.3)
POTASSIUM SERPL-MCNC: 4.1 MMOL/L — SIGNIFICANT CHANGE UP (ref 3.5–5.3)
POTASSIUM SERPL-SCNC: 4.1 MMOL/L — SIGNIFICANT CHANGE UP (ref 3.5–5.3)
POTASSIUM SERPL-SCNC: 4.1 MMOL/L — SIGNIFICANT CHANGE UP (ref 3.5–5.3)
PROT SERPL-MCNC: 7.5 G/DL — SIGNIFICANT CHANGE UP (ref 6–8.3)
PROT SERPL-MCNC: 7.5 G/DL — SIGNIFICANT CHANGE UP (ref 6–8.3)
PROT UR-MCNC: SIGNIFICANT CHANGE UP MG/DL
PROT UR-MCNC: SIGNIFICANT CHANGE UP MG/DL
RBC # BLD: 3.99 M/UL — SIGNIFICANT CHANGE UP (ref 3.8–5.2)
RBC # BLD: 3.99 M/UL — SIGNIFICANT CHANGE UP (ref 3.8–5.2)
RBC # FLD: 14.2 % — SIGNIFICANT CHANGE UP (ref 10.3–14.5)
RBC # FLD: 14.2 % — SIGNIFICANT CHANGE UP (ref 10.3–14.5)
RBC CASTS # UR COMP ASSIST: 0 /HPF — SIGNIFICANT CHANGE UP (ref 0–4)
RBC CASTS # UR COMP ASSIST: 0 /HPF — SIGNIFICANT CHANGE UP (ref 0–4)
RSV RNA NPH QL NAA+NON-PROBE: SIGNIFICANT CHANGE UP
RSV RNA NPH QL NAA+NON-PROBE: SIGNIFICANT CHANGE UP
SAO2 % BLDV: 33.4 % — LOW (ref 67–88)
SAO2 % BLDV: 33.4 % — LOW (ref 67–88)
SARS-COV-2 RNA SPEC QL NAA+PROBE: SIGNIFICANT CHANGE UP
SARS-COV-2 RNA SPEC QL NAA+PROBE: SIGNIFICANT CHANGE UP
SODIUM SERPL-SCNC: 132 MMOL/L — LOW (ref 135–145)
SODIUM SERPL-SCNC: 132 MMOL/L — LOW (ref 135–145)
SP GR SPEC: 1.02 — SIGNIFICANT CHANGE UP (ref 1–1.03)
SP GR SPEC: 1.02 — SIGNIFICANT CHANGE UP (ref 1–1.03)
SQUAMOUS # UR AUTO: 6 /HPF — HIGH (ref 0–5)
SQUAMOUS # UR AUTO: 6 /HPF — HIGH (ref 0–5)
UROBILINOGEN FLD QL: 0.2 MG/DL — SIGNIFICANT CHANGE UP (ref 0.2–1)
UROBILINOGEN FLD QL: 0.2 MG/DL — SIGNIFICANT CHANGE UP (ref 0.2–1)
WBC # BLD: 10.13 K/UL — SIGNIFICANT CHANGE UP (ref 3.8–10.5)
WBC # BLD: 10.13 K/UL — SIGNIFICANT CHANGE UP (ref 3.8–10.5)
WBC # FLD AUTO: 10.13 K/UL — SIGNIFICANT CHANGE UP (ref 3.8–10.5)
WBC # FLD AUTO: 10.13 K/UL — SIGNIFICANT CHANGE UP (ref 3.8–10.5)
WBC UR QL: 16 /HPF — HIGH (ref 0–5)
WBC UR QL: 16 /HPF — HIGH (ref 0–5)

## 2023-12-26 PROCEDURE — 93010 ELECTROCARDIOGRAM REPORT: CPT

## 2023-12-26 PROCEDURE — 71045 X-RAY EXAM CHEST 1 VIEW: CPT | Mod: 26

## 2023-12-26 PROCEDURE — 99285 EMERGENCY DEPT VISIT HI MDM: CPT

## 2023-12-26 PROCEDURE — 70450 CT HEAD/BRAIN W/O DYE: CPT | Mod: 26,MA

## 2023-12-26 RX ORDER — SODIUM CHLORIDE 9 MG/ML
1000 INJECTION, SOLUTION INTRAVENOUS ONCE
Refills: 0 | Status: COMPLETED | OUTPATIENT
Start: 2023-12-26 | End: 2023-12-26

## 2023-12-26 RX ORDER — INSULIN HUMAN 100 [IU]/ML
5 INJECTION, SOLUTION SUBCUTANEOUS ONCE
Refills: 0 | Status: COMPLETED | OUTPATIENT
Start: 2023-12-26 | End: 2023-12-26

## 2023-12-26 RX ADMIN — INSULIN HUMAN 5 UNIT(S): 100 INJECTION, SOLUTION SUBCUTANEOUS at 21:08

## 2023-12-26 RX ADMIN — SODIUM CHLORIDE 1000 MILLILITER(S): 9 INJECTION, SOLUTION INTRAVENOUS at 20:34

## 2023-12-26 NOTE — H&P ADULT - ASSESSMENT
73F w/ PMH insulin dpdt TIID, NSLC adenocarcinoma w mets to brain (MR head 10/23 showing R posterior frontal enhancing lesion with vasogenic edema which resolved on MRH 10/23) s/p RT x1 07/2023, on chemo q 3 wks (last tx 11/16/23),R hip replacement, RA, CKD IIIA, HTN and HLD presenting for hyperglycemia and polyuria admitted for resistant UTI.  73F PMH insulin dpdt TIID, NSCLC adenocarcinoma w mets to brain (s/p RT x1 07/2023, s/p chemo x2 (last tx 11/16/23),R hip replacement, RA, CKD IIIA, HTN and HLD presenting for hyperglycemia and polyuria admitted for resistant UTI.

## 2023-12-26 NOTE — H&P ADULT - NSHPOUTPATIENTPROVIDERS_GEN_ALL_CORE
PCP Dr Marissa Douglass  Cardiologist Dr Marissa Casarez  Oncologist Dr Delaney PCP Dr Marissa Douglass  Cardiologist Dr Marissa Casarez [phone]  Oncologist Dr Sedrick Delaney [phone]  Nephrologist Dr Burgess PCP Dr Marissa Douglass  Cardiologist Dr Marissa Casarez [phone]  Oncologist Dr Sedrick Delaney [phone]  Nephrologist Dr Burgess

## 2023-12-26 NOTE — H&P ADULT - PROBLEM SELECTOR PLAN 8
Home meds lipitor 40  - c/w lipitor 40  - f/u lipid panel Home meds ferrous sulfate 324mg qd  H/H stable  - c/w ferrous sulfate Home meds losartan 100mg, amlodipine 10mg  - c/w amlodipine 10mg  - hold losartan 100mg iso DUNIA Home meds hydroxychloroquine 200 q12, sulfasalazine 1000mg q12   Takes prednisone 5mg daily as needed for RA flares which she reports she took last two days  - hold home meds iso acute UTI

## 2023-12-26 NOTE — H&P ADULT - PROBLEM SELECTOR PLAN 7
Home meds ferrous sulfate 324mg qd  H/H stable  - c/w ferrous sulfate Home meds losartan 100mg, amlodipine 10mg  - c/w amlodipine 10mg  - hold losartan 100mg iso DUNIA Home meds hydroxychloroquine 200 q12, sulfasalazine 1000mg q12   Takes prednisone 5mg daily as needed for RA flares which she reports she took last two days  - hold home meds iso acute UTI - elevated since 10/2024, LFTs wnl  No abdominal pain   C/f bone mets iso metastatic NSCLC  - f/u GGT

## 2023-12-26 NOTE — H&P ADULT - PROBLEM SELECTOR PLAN 12
F: s/p 1L LR  E: Replete PRN  N: CC diet  DVT pphx: heparin 5000 q8  CODE STATUS: Full  DISPO: [facility] F: s/p 1L LR  E: Replete PRN  N: CC diet  DVT pphx: heparin 5000 q8  CODE STATUS: Full  DISPO: [facility] Home meds lipitor 40  - c/w lipitor 40  - f/u lipid panel

## 2023-12-26 NOTE — H&P ADULT - PROBLEM SELECTOR PLAN 11
F: s/p 1L LR  E: Replete PRN  N: CC diet  DVT pphx: heparin 5000 q8  CODE STATUS: Full  DISPO: [facility] F: s/p 1L LR  E: Replete PRN  N: CC diet  DVT pphx: heparin 5000 q8  CODE STATUS: Full  DISPO: [facility] Home meds lipitor 40  - c/w lipitor 40  - f/u lipid panel CTH shows chronic R occipital lobe, chronic L internal capsule, L thalamus, L cerebellar infarcts  No focal deficits  - risk benefit discussion- c/w aspirin 81mg vs hx of falls

## 2023-12-26 NOTE — H&P ADULT - PROBLEM SELECTOR PROBLEM 4
Chronic renal failure, stage 3 (moderate), unspecified whether stage 3a or 3b CKD NSCLC metastatic to brain Influenza A

## 2023-12-26 NOTE — ED PROVIDER NOTE - CLINICAL SUMMARY MEDICAL DECISION MAKING FREE TEXT BOX
+hyperglycemia, no lab evidence of dka. will admit for failure of outpatient treatment. per review of prior culture bacteria growing only sensitive to intravenous options.    will obtain ct to eval for intracranial trauma after fall.

## 2023-12-26 NOTE — H&P ADULT - PROBLEM SELECTOR PLAN 9
Home meds lipitor 40  - c/w lipitor 40  - f/u lipid panel CTH shows chronic R occipital lobe, chronic L internal capsule, L thalamus, L cerebellar infarcts  No focal deficits  - risk benefit discussion- c/w aspirin 81mg vs hx of falls Home meds ferrous sulfate 324mg qd  H/H stable  - c/w ferrous sulfate Home meds losartan 100mg, amlodipine 10mg  - c/w amlodipine 10mg  - hold losartan 100mg iso DUNIA

## 2023-12-26 NOTE — H&P ADULT - PROBLEM SELECTOR PLAN 3
Follows with Dr Delaney  Last chemo session 11/16  [facility] 10/24 showed improvement in vasogenic edema   - Follows with Dr Delaney  Last chemo session 11/16  [facility] 10/24 showed improvement in vasogenic edema   - Reports home . In ED,  Bhb negative. Took prednisone 5mg for the last two days  Home meds lantus 17U, lispro 5U before meals, januvia 100mg,   s/p 5U humulin in ED  - c/w lantus 15U , moderate iSS with BG monitoring before meals and at bedtime  - f/u A1C  - CC diet

## 2023-12-26 NOTE — H&P ADULT - NSHPPHYSICALEXAM_GEN_ALL_CORE
.  VITAL SIGNS:  T(C): 36.7 (12-26-23 @ 22:12), Max: 36.7 (12-26-23 @ 22:12)  T(F): 98.1 (12-26-23 @ 22:12), Max: 98.1 (12-26-23 @ 22:12)  HR: 62 (12-26-23 @ 22:12) (62 - 74)  BP: 179/92 (12-26-23 @ 22:12) (174/107 - 179/92)  BP(mean): --  RR: 18 (12-26-23 @ 22:12) (16 - 18)  SpO2: 96% (12-26-23 @ 22:12) (96% - 96%)  Wt(kg): --    PHYSICAL EXAM:    Constitutional: Resting comfortably in bed; NAD  Head: NC/AT  Eyes: PERRL, EOMI, clear conjunctiva  ENT: no nasal discharge; uvula midline, no oropharyngeal erythema or exudates; MMM  Neck: supple; no JVD or thyromegaly  Respiratory: CTA B/L; no W/R/R, no retractions  Cardiac: +S1/S2; RRR; no M/R/G;  Gastrointestinal: soft, NT/ND; no rebound or guarding; +BSx4  : suprapubic tenderness, primafit in place draining light yellow frothy urine   Extremities: WWP, no clubbing or cyanosis; no peripheral edema  Musculoskeletal: NROM x4; no joint swelling, tenderness or erythema  Vascular: 2+ radial, femoral, DP/PT pulses B/L, BLE cool with dry skin no open ulcers/wounds  Dermatologic: skin warm, dry and intact; no rashes, wounds, or scars  Neurologic: AAOx3; CNII-XII grossly intact; no focal deficits  Psychiatric: affect and characteristics of appearance, verbalizations, behaviors are appropriate .  VITAL SIGNS:  T(C): 36.7 (12-26-23 @ 22:12), Max: 36.7 (12-26-23 @ 22:12)  T(F): 98.1 (12-26-23 @ 22:12), Max: 98.1 (12-26-23 @ 22:12)  HR: 62 (12-26-23 @ 22:12) (62 - 74)  BP: 179/92 (12-26-23 @ 22:12) (174/107 - 179/92)  BP(mean): --  RR: 18 (12-26-23 @ 22:12) (16 - 18)  SpO2: 96% (12-26-23 @ 22:12) (96% - 96%)  Wt(kg): --    PHYSICAL EXAM:    Constitutional: Resting comfortably in bed; NAD  Head: NC/AT  Eyes: PERRL, EOMI, clear conjunctiva  ENT: no nasal discharge; uvula midline, no oropharyngeal erythema or exudates; MMM  Neck: supple; no JVD or thyromegaly  Respiratory: CTA B/L; no W/R/R, no retractions  Cardiac: +S1/S2; RRR; no M/R/G;  Gastrointestinal: soft, NT/ND; no rebound or guarding; +BSx4  : + suprapubic tenderness, primafit in place draining light yellow frothy urine   Extremities: WWP, no clubbing or cyanosis; no peripheral edema  Musculoskeletal: NROM x4; no joint swelling, tenderness or erythema  Vascular: 2+ radial, femoral, DP/PT pulses B/L, BLE cool with dry skin no open ulcers/wounds  Dermatologic: skin warm, dry and intact; no rashes, wounds, or scars  Neurologic: AAOx3; CNII-XII grossly intact; no focal deficits  Psychiatric: affect and characteristics of appearance, verbalizations, behaviors are appropriate .  VITAL SIGNS:  T(C): 36.7 (12-26-23 @ 22:12), Max: 36.7 (12-26-23 @ 22:12)  T(F): 98.1 (12-26-23 @ 22:12), Max: 98.1 (12-26-23 @ 22:12)  HR: 62 (12-26-23 @ 22:12) (62 - 74)  BP: 179/92 (12-26-23 @ 22:12) (174/107 - 179/92)  BP(mean): --  RR: 18 (12-26-23 @ 22:12) (16 - 18)  SpO2: 96% (12-26-23 @ 22:12) (96% - 96%)  Wt(kg): --    PHYSICAL EXAM:    Constitutional: Resting comfortably in bed; NAD  Head: NC/AT  Eyes: PERRL, EOMI, clear conjunctiva  ENT: no nasal discharge; uvula midline, no oropharyngeal erythema or exudates; MMM  Neck: supple; no JVD or thyromegaly  Respiratory: CTA B/L; no W/R/R, no retractions  Cardiac: +S1/S2; RRR; no M/R/G;  Gastrointestinal: soft, NT/ND; no rebound or guarding; +BSx4  : + suprapubic tenderness, primafit in place draining light yellow frothy urine   Extremities: WWP, no clubbing or cyanosis; no peripheral edema  Musculoskeletal: NROM x4; no joint swelling, tenderness or erythema  Vascular: 2+ radial, femoral, DP/PT pulses B/L  Dermatologic: skin warm, dry and intact; no rashes, wounds, or scars, BLE cool with dry skin no open ulcers/wounds, L chest port   Neurologic: AAOx3; CNII-XII grossly intact; no pronator drift, no focal deficits  Psychiatric: affect and characteristics of appearance, verbalizations, behaviors are appropriate

## 2023-12-26 NOTE — ED ADULT NURSE NOTE - NSFALLHARMRISKINTERV_ED_ALL_ED
Assistance OOB with selected safe patient handling equipment if applicable/Communicate risk of Fall with Harm to all staff, patient, and family/Monitor gait and stability/Provide patient with walking aids/Provide visual cue: red socks, yellow wristband, yellow gown, etc/Reinforce activity limits and safety measures with patient and family/Bed in lowest position, wheels locked, appropriate side rails in place/Call bell, personal items and telephone in reach/Instruct patient to call for assistance before getting out of bed/chair/stretcher/Non-slip footwear applied when patient is off stretcher/[place] to call system/Physically safe environment - no spills, clutter or unnecessary equipment/Purposeful Proactive Rounding/Room/bathroom lighting operational, light cord in reach Assistance OOB with selected safe patient handling equipment if applicable/Communicate risk of Fall with Harm to all staff, patient, and family/Monitor gait and stability/Provide patient with walking aids/Provide visual cue: red socks, yellow wristband, yellow gown, etc/Reinforce activity limits and safety measures with patient and family/Bed in lowest position, wheels locked, appropriate side rails in place/Call bell, personal items and telephone in reach/Instruct patient to call for assistance before getting out of bed/chair/stretcher/Non-slip footwear applied when patient is off stretcher/[place] to call system/Physically safe environment - no spills, clutter or unnecessary equipment/Purposeful Proactive Rounding/Room/bathroom lighting operational, light cord in reach

## 2023-12-26 NOTE — H&P ADULT - NSHPSOCIALHISTORY_GEN_ALL_CORE
Reports previous history of smoking  Denies alcohol use history, illicit drug use history    Ambulates with rollator in and out of house  Lives alone in elevator apartment building

## 2023-12-26 NOTE — ED ADULT TRIAGE NOTE - CHIEF COMPLAINT QUOTE
Pt presents to ED C/O urinary symptoms with foul smelling urine x 4 weeks. Pt has hx of lung CA with freq UTI. Pt also C/O unrelieved cough since receiving COVID vaccine last Wed. Reports last chemo treatment NOV. 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 Hx HTN, missed BP medication tonight. Pt presents to ED C/O urinary symptoms with foul smelling urine x 4 weeks. Pt has hx of lung CA with mets to brain, with freq UTI. Pt also C/O unrelieved cough since receiving COVID vaccine last Wed and frequent falls with bruising to L shoulder and L face. Reports last chemo treatment NOV. 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 Hx HTN, missed BP medication tonight. Has R chest port PTA.

## 2023-12-26 NOTE — H&P ADULT - PROBLEM SELECTOR PLAN 6
Home meds losartan 100mg, amlodipine 10mg  - c/w losartan 100mg Home meds hydroxychloroquine 200 q12, sulfasalazine 1000mg q12   Takes prednisone 5mg daily as needed for RA flares which she reports she took last two days  - hold home meds iso acute UTI Follows with Dr Delaney  Last chemo session 11/16- has been holding chemo sessions due to hyperglycemia  MR head 10/23 showing R posterior frontal enhancing lesion with vasogenic edema which resolved on [facility] 10/23  Has port placed at [facility] 9/2023 per patient- site clean and dry reports last accessed 11/2023 for possible chemo  - f/u outpatient Follows with Dr Delanye  Last chemo session 11/16- has been holding chemo sessions due to hyperglycemia  MR head 10/23 showing R posterior frontal enhancing lesion with vasogenic edema which resolved on [facility] 10/23  Has port placed at [facility] 9/2023 per patient- site clean and dry reports last accessed 11/2023 for possible chemo  - f/u outpatient

## 2023-12-26 NOTE — H&P ADULT - PROBLEM SELECTOR PLAN 10
F: s/p 1L LR  E: Replete PRN  N: CC diet  DVT pphx: heparin 5000 q8  CODE STATUS: Full  DISPO: [facility] F: s/p 1L LR  E: Replete PRN  N: CC diet  DVT pphx: heparin 5000 q8  CODE STATUS: Full  DISPO: [facility] Home meds lipitor 40  - c/w lipitor 40  - f/u lipid panel [facility] shows chronic R occipital lobe, chronic L internal capsule, L thalamus, L cerebellar infarcts  No focal deficits  - risk benefit discussion- c/w aspirin 81mg vs hx of falls Home meds ferrous sulfate 324mg qd  H/H stable  - c/w ferrous sulfate

## 2023-12-26 NOTE — ED ADULT NURSE NOTE - OBJECTIVE STATEMENT
Pt presents to the ED with complaints of foul smelling urine for weeks, states she was treated with antibiotics for weeks prior to arrival. On arrival to the ED, pt's blood sugar was in the 400s, pt states compliance with medication.

## 2023-12-26 NOTE — H&P ADULT - PROBLEM SELECTOR PROBLEM 5
Rheumatoid arthritis Chronic renal failure, stage 3 (moderate), unspecified whether stage 3a or 3b CKD

## 2023-12-26 NOTE — ED PROVIDER NOTE - OBJECTIVE STATEMENT
72 yo F w PMH of DM2 on insulin, lung CA, HTN. for 4 weeks has been having urinary frequency and hyperglycemia. no fever. was seen in ED 2 weeks ago and dx with uti dc on 72 yo F w PMH of DM2 on insulin, lung CA, HTN. for 4 weeks has been having urinary frequency and hyperglycemia. no fever. was seen in ED 2 weeks ago and dx with uti dc on cefpodoxime withno change in symptoms. has been taking abx as prescribed. chronically feels dizzy if she stands up too abruptly, 2-3 days ago stood up abruptly, lost her balance and fell, striking her head. she occasionally falls. able to ambulate without issue since then. 74 yo F w PMH of DM2 on insulin, lung CA, HTN. for 4 weeks has been having urinary frequency and hyperglycemia. no fever. was seen in ED 2 weeks ago and dx with uti dc on cefpodoxime withno change in symptoms. has been taking abx as prescribed. chronically feels dizzy if she stands up too abruptly, 2-3 days ago stood up abruptly, lost her balance and fell, striking her head. she occasionally falls. able to ambulate without issue since then.

## 2023-12-26 NOTE — ED PROVIDER NOTE - PHYSICAL EXAMINATION
Detail Level: Detailed Depth Of Biopsy: dermis Was A Bandage Applied: Yes Size Of Lesion In Cm: 0.4 X Size Of Lesion In Cm: 0 Biopsy Type: H and E Biopsy Method: Dermablade General: Awake, alert and oriented. No acute distress.   Skin:  Appropriate color for ethnicity  HENMT: head normocephalic and atraumatic  EYES: Conjunctiva clear. nonicteric sclera  Cardiac: well perfused  Respiratory: breathing comfortably on room air  Abdominal: nondistended, soft, nontender  MSK:  no visualized external signs of trauma. no apparent deficits in ROM of any extremity  Neurological: The patient is awake, alert and oriented with normal speech. CN 2-12 grossly intact. no apparent deficits. Memory is normal and thought process is intact. moving all extremities spontaneously   Psychiatric: Appropriate mood and affect. Good judgement and insight Anesthesia Type: 2% lidocaine with epinephrine Anesthesia Volume In Cc (Will Not Render If 0): 0.5 Hemostasis: Aluminum Chloride Wound Care: Petrolatum Dressing: bandage Destruction After The Procedure: No Type Of Destruction Used: Curettage Curettage Text: The wound bed was treated with curettage after the biopsy was performed. Cryotherapy Text: The wound bed was treated with cryotherapy after the biopsy was performed. Electrodesiccation Text: The wound bed was treated with electrodesiccation after the biopsy was performed. Electrodesiccation And Curettage Text: The wound bed was treated with electrodesiccation and curettage after the biopsy was performed. Silver Nitrate Text: The wound bed was treated with silver nitrate after the biopsy was performed. Lab: [facility] Lab Facility: [id number] Consent: Written consent was obtained and risks were reviewed including but not limited to scarring, infection, bleeding, scabbing, incomplete removal, nerve damage and allergy to anesthesia. Post-Care Instructions: I reviewed with the patient in detail post-care instructions. Patient is to keep the biopsy site dry overnight, and then apply Vaseline daily until healed. Written instructions given to the patient. Notification Instructions: Patient will be notified of biopsy results. However, patient instructed to call the office if not contacted within 2 weeks. Billing Type: [employer] Information: Selecting Yes will display possible errors in your note based on the variables you have selected. This validation is only offered as a suggestion for you. PLEASE NOTE THAT THE VALIDATION TEXT WILL BE REMOVED WHEN YOU FINALIZE YOUR NOTE. IF YOU WANT TO FAX A PRELIMINARY NOTE YOU WILL NEED TO TOGGLE THIS TO 'NO' IF YOU DO NOT WANT IT IN YOUR FAXED NOTE. Lab: [id number] Lab Facility: [id number] Billing Type: Third-Party Bill Size Of Lesion In Cm: 1 General: Awake, alert and oriented. No acute distress.   Skin:  Appropriate color for ethnicity  HENMT: head normocephalic. small ecchymoses latyeral to left orbit   EYES: Conjunctiva clear. nonicteric sclera  Cardiac: well perfused  Respiratory: breathing comfortably on room air  Abdominal: nondistended, soft, nontender  MSK:  no visualized external signs of trauma. no apparent deficits in ROM of any extremity  Neurological: The patient is awake, alert and oriented with normal speech. CN 2-12 grossly intact. no apparent deficits. Memory is normal and thought process is intact. moving all extremities spontaneously   Psychiatric: Appropriate mood and affect. Good judgement and insight

## 2023-12-26 NOTE — ED ADULT NURSE NOTE - CHIEF COMPLAINT QUOTE
Pt presents to ED C/O urinary symptoms with foul smelling urine x 4 weeks. Pt has hx of lung CA with mets to brain, with freq UTI. Pt also C/O unrelieved cough since receiving COVID vaccine last Wed and frequent falls with bruising to L shoulder and L face. Reports last chemo treatment NOV. 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 Hx HTN, missed BP medication tonight. Has R chest port PTA.

## 2023-12-26 NOTE — H&P ADULT - HISTORY OF PRESENT ILLNESS
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 Pt reports fall 2 weeks ago    Pt was recently seen in [facility] ED 12/14 for hyperglycemia 500s, UTI   2 weeks ago UTI discharged on cefpodoxime 100mg x 14 days.     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 Pt reports fall 2 weeks ago    Pt was recently seen in [facility] ED 12/14 for hyperglycemia 500s, UTI   2 weeks ago UTI discharged on cefpodoxime 100mg x 14 days.     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 She reports this morning she felt dizzy and checked her blood sugar which was 514.  Pt reports fall yesterday and the day before when she experienced dizziness and lost consciousness "for a second". She reports suprapubic tenderness and foul  smelling urine. She was seen in [facility] ED 12/14 for hyperglycemia 500s and UTI discharged cefpodoxime x 14 days which she reported she finished. She reports she has been taking prednisone 5mg for last two days because it was cloudy and she was concerned for RA flare. She reports she drank cranberry juice this morning and has been urinating well since then. She denies fever, chills, LBP constipation/diarrhea, HA, dizziness at this time.    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 She reports this morning she felt dizzy and checked her blood sugar which was 514.  Pt reports fall yesterday and the day before when she experienced dizziness and lost consciousness "for a second". She reports suprapubic tenderness and foul  smelling urine. She was seen in [facility] ED 12/14 for hyperglycemia 500s and UTI discharged cefpodoxime x 14 days which she reported she finished. She reports she has been taking prednisone 5mg for last two days because it was cloudy and she was concerned for RA flare. She reports she drank cranberry juice this morning and has been urinating well since then. She denies fever, chills, LBP constipation/diarrhea, HA, dizziness at this time.    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 She reports this morning she felt dizzy and checked her blood sugar which was 514.  Pt reports fall yesterday and the day before when she experienced dizziness and lost consciousness "for a second". She reports suprapubic tenderness and foul  smelling urine. She was seen in [facility] ED 12/14 for hyperglycemia 500s and UTI discharged cefpodoxime x 14 days which she reported she finished. She reports she has been taking prednisone 5mg for last two days because it was cloudy and she was concerned for RA flare. She reports she drank cranberry juice this morning and has been urinating well since then. She denies fever, chills, LBP constipation/diarrhea, HA, dizziness at this time.    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 She reports this morning she felt dizzy and checked her blood sugar which was 514.  Pt reports fall yesterday and the day before when she experienced dizziness and lost consciousness "for a second". She reports suprapubic tenderness and foul  smelling urine. She was seen in [facility] ED 12/14 for hyperglycemia 500s and UTI discharged cefpodoxime x 14 days which she reported she finished. She reports she has been taking prednisone 5mg for last two days because it was cloudy and she was concerned for RA flare. She reports she drank cranberry juice this morning and has been urinating well since then. She denies fever, chills, LBP constipation/diarrhea, HA, dizziness at this time.    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 She reports this morning she felt dizzy and checked her blood sugar which was 514.  Pt reports fall yesterday and the day before when she experienced dizziness and lost consciousness "for a second". She reports suprapubic tenderness and foul  smelling urine. She was seen in [facility] ED 12/14 for hyperglycemia 500s and UTI discharged cefpodoxime x 14 days which she reported she finished. She reports she has been taking prednisone 5mg for last two days because it was cloudy and she was concerned for RA flare. She reports she drank cranberry juice this morning and has been urinating well since then. She denies fever, chills, LBP constipation/diarrhea, HA, dizziness at this time.    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 She reports this morning she felt dizzy and checked her blood sugar which was 514.  Pt reports fall yesterday and the day before when she experienced dizziness and lost consciousness "for a second". She reports suprapubic tenderness and foul  smelling urine. She was seen in [facility] ED 12/14 for hyperglycemia 500s and UTI discharged cefpodoxime x 14 days which she reported she finished. She reports she has been taking prednisone 5mg for last two days because it was cloudy and she was concerned for RA flare. She reports she drank cranberry juice this morning and has been urinating well since then. She denies fever, chills, LBP constipation/diarrhea, HA, dizziness at this time.    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 She reports this morning she felt dizzy and checked her blood sugar which was 514.  Pt reports fall yesterday and the day before when she experienced dizziness and lost consciousness "for a second". She reports suprapubic tenderness and foul  smelling urine. She was seen in [facility] ED 12/14 for hyperglycemia 500s and UTI discharged cefpodoxime x 14 days which she reported she finished. Last UCx 12/14 showing Ucx >100k klebsiella oxytoca/raoutella ornithinolytica R to CTX. She reports she has been taking prednisone 5mg for last two days because it was cloudy and she was concerned for RA flare. She reports she drank cranberry juice this morning and has been urinating well since then. She denies fever, chills, LBP constipation/diarrhea, HA, dizziness at this time.    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 She reports this morning she felt dizzy and checked her blood sugar which was 514.  Pt reports fall yesterday and the day before when she experienced dizziness and lost consciousness "for a second". She reports suprapubic tenderness and foul  smelling urine. She was seen in [facility] ED 12/14 for hyperglycemia 500s and UTI discharged cefpodoxime x 14 days which she reported she finished. Last UCx 12/14 showing Ucx >100k klebsiella oxytoca/raoutella ornithinolytica R to CTX. She reports she has been taking prednisone 5mg for last two days because it was cloudy and she was concerned for RA flare. She reports she drank cranberry juice this morning and has been urinating well since then. She denies fever, chills, LBP constipation/diarrhea, HA, dizziness at this time.    ED course  Vitals T 98.1, HR 62, /92, RR 18 O2 sat 96% RA  Labs WBC 10.13 with immature granulocyte predominance, no bandemia, Na 132 (adjusted for Glu 136), Glu 414, Bhb 0.1, UA small leuk esterase, , glu >1000, Flu A positive  EKG NSR with APC HR 63, TWI V1 (present on previous EKG)  Imaging CTH no acute infarct/hemorrhage, chronic infarcts at R occipital lobe, chronic L internal capsule, L thalamus, L cerebellar infarcts  Intervention: humulin 5U, LR 1L 73F PMH insulin dpdt TIID, NSCLC adenocarcinoma w mets to brain (s/p RT x1 07/2023, s/p chemo x2 (last tx 11/16/23),R hip replacement, RA, CKD IIIA, HTN and HLD presenting for persist dysuria and polyuria. She reports this morning she felt dizzy and checked her blood sugar which was 514.  Pt reports fall yesterday and the day before when she experienced dizziness and lost consciousness "for a second". She reports suprapubic tenderness and foul  smelling urine. She was seen in [facility] ED 12/14 for hyperglycemia 500s and UTI discharged cefpodoxime x 14 days which she reported she finished. Last UCx 12/14 showing Ucx >100k klebsiella oxytoca/raoutella ornithinolytica R to CTX. She reports she has been taking prednisone 5mg for last two days because it was cloudy and she was concerned for RA flare. She reports she drank cranberry juice this morning and has been urinating well since then. She denies fever, chills, LBP constipation/diarrhea, HA, dizziness at this time.    ED course  Vitals T 98.1, HR 62, /92, RR 18 O2 sat 96% RA  Labs WBC 10.13 with immature granulocyte predominance, no bandemia, Na 132 (adjusted for Glu 136), Glu 414, Bhb 0.1, . UA small leuk esterase, , glu >1000, Flu A positive  EKG NSR with APC HR 63, TWI V1 (present on previous EKG)  Imaging CTH no acute infarct/hemorrhage, chronic infarcts at R occipital lobe, chronic L internal capsule, L thalamus, L cerebellar infarcts  Intervention: humulin 5U, LR 1L 73F PMH insulin dpdt TIID, NSCLC adenocarcinoma w mets to brain (s/p RT x1 07/2023, s/p chemo x2 (last tx 11/16/23),R hip replacement, RA, CKD IIIA, HTN and HLD presenting for persist dysuria and polyuria. She reports this morning she felt dizzy and checked her blood sugar which was 514.  Pt reports fall yesterday and the day before when she experienced dizziness and lost consciousness "for a second". She reports suprapubic tenderness and foul  smelling urine. She was seen in [facility] ED 12/14 for hyperglycemia 500s and UTI discharged cefpodoxime x 14 days which she reported she finished. Last UCx 12/14 showing Ucx >100k klebsiella oxytoca/raoutella ornithinolytica R to CTX. She reports she has been taking prednisone 5mg for last two days because it was cloudy and she was concerned for RA flare. She reports she drank cranberry juice this morning and has been urinating well since then. She denies fever, chills, LBP constipation/diarrhea, HA, dizziness at this time.    ED course  Vitals T 98.1, HR 62, /92, RR 18 O2 sat 96% RA  Labs WBC 10.13 with immature granulocyte predominance, no bandemia, Na 132 (adjusted for Glu 136), Glu 414, Bhb 0.1, . UA small leuk esterase, , glu >1000, Flu A positive  EKG NSR with APC HR 63, TWI V1 (present on previous EKG)  Imaging CTH no acute infarct/hemorrhage, chronic infarcts at R occipital lobe, chronic L internal capsule, L thalamus, L cerebellar infarcts  Intervention: humulin 5U, LR 1L

## 2023-12-26 NOTE — H&P ADULT - PROBLEM SELECTOR PLAN 2
Reports home    Home meds lantus 17U, lispro 5U before meals, januvia 100mg,   s/p 5U humulin in ED  - c/w lantus 15U , moderate iSS  - f/u A1C Reports home . In ED,  Bhb negative. Took prednisone 5mg for the last two days  Home meds lantus 17U, lispro 5U before meals, januvia 100mg,   s/p 5U humulin in ED  - c/w lantus 15U , moderate iSS with BG monitoring before meals and at bedtime  - f/u A1C  - CC diet Pt reports dizziness that led to falls x 2 days with head strike. Denies AC use. Pt reports dizziness prior to fall  CTH shows no acute hemorrhage/infarct. EKG showing NSR with APC rate 56  s/p 1L LR  Fall possibly 2/2 vasovagal vs bradycardia vs APC  - f/u orthostatics  - hold toprol 100mg qd  - consider outpatient holter monitor

## 2023-12-26 NOTE — H&P ADULT - PROBLEM SELECTOR PLAN 1
Presenting with persistent dysuria and polyuria  Presented to [facility] ED 11/16 for UTI discharged on cefpodoxime x 14 days which pt completed  Afebrile, not septic  Reports  - f/u bcx 12/27  - f/u Ucx (collected prior to abx)  - given meropenem 1g (CrCl 39)- call for antibiotic approval in AM Presenting with persistent dysuria and polyuria  Presented to [facility] ED 11/16 for UTI discharged on cefpodoxime x 14 days which pt completed  Afebrile, not septic  Reports  - f/u bcx 12/27  - f/u Ucx (collected prior to abx)  - given meropenem 1g (CrCl 39)- call for antibiotic approval in AM Presenting with persistent dysuria and polyuria  Presented to [facility] ED 11/16 for UTI discharged on cefpodoxime x 14 days which pt completed  Ucx 11/16 and 12/14 showed 100k  klebsiella oxytoca/raoutella ornithinolytica resistant to CTX. Was taking farxiga  Afebrile, not septic  Reports  - f/u bcx 12/27  - f/u Ucx (collected prior to abx)  - given meropenem 1g (CrCl 39)- call for antibiotic approval in AM  - adri mcdonald Presenting with persistent dysuria and polyuria  Presented to [facility] ED 11/16 for UTI discharged on cefpodoxime x 14 days which pt completed  Ucx 11/16 and 12/14 showed 100k  klebsiella oxytoca/raoutella ornithinolytica resistant to CTX. Was taking farxiga  Afebrile, not septic  Reports  - f/u bcx 12/27  - f/u Ucx (collected prior to abx)  - given meropenem 1g (CrCl 39)- call for antibiotic approval in AM  - adri mcdonald Presenting with persistent dysuria and polyuria  Presented to [facility] ED 11/16 for UTI discharged on cefpodoxime x 14 days which pt completed  Ucx 11/16 and 12/14 showed 100k  klebsiella oxytoca/raoutella ornithinolytica resistant to CTX. Was taking farxiga, prednisone 5mg   Afebrile, not septic  Reports  - f/u bcx 12/27  - f/u Ucx (collected prior to abx)  - given meropenem 1g (CrCl 39)- call for antibiotic approval in AM  - hold farxiga iso UTI Presenting with persistent dysuria and polyuria  Presented to [facility] ED 11/16 for UTI discharged on cefpodoxime x 14 days which pt completed  Ucx 11/16 and 12/14 showed 100k  klebsiella oxytoca/raoutella ornithinolytica resistant to CTX. Was taking farxiga, prednisone 5mg   Afebrile, not septic  Reports  - f/u bcx 12/27  - f/u Ucx (collected prior to abx)  - given meropenem 1g (CrCl 39)- call for antibiotic approval in AM  - hold farxiga iso UTI

## 2023-12-26 NOTE — H&P ADULT - PROBLEM SELECTOR PLAN 5
Home meds Baseline Cr 0.89-1.01  s/p 1L LR  - f/u repeat Cr, f/u urine studies   - f/u cystatin c Admission Cr 1.23 (Baseline Cr 0.89-1.01)   Follows with   s/p 1L LR  - f/u repeat Cr  - f/u cystatin c  - hold losartan 100mg due to increase Cr

## 2023-12-27 ENCOUNTER — NON-APPOINTMENT (OUTPATIENT)
Age: 73
End: 2023-12-27

## 2023-12-27 DIAGNOSIS — N39.0 URINARY TRACT INFECTION, SITE NOT SPECIFIED: ICD-10-CM

## 2023-12-27 DIAGNOSIS — D50.9 IRON DEFICIENCY ANEMIA, UNSPECIFIED: ICD-10-CM

## 2023-12-27 DIAGNOSIS — E11.9 TYPE 2 DIABETES MELLITUS WITHOUT COMPLICATIONS: ICD-10-CM

## 2023-12-27 DIAGNOSIS — Z29.9 ENCOUNTER FOR PROPHYLACTIC MEASURES, UNSPECIFIED: ICD-10-CM

## 2023-12-27 DIAGNOSIS — M06.9 RHEUMATOID ARTHRITIS, UNSPECIFIED: ICD-10-CM

## 2023-12-27 DIAGNOSIS — E78.5 HYPERLIPIDEMIA, UNSPECIFIED: ICD-10-CM
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
## 2023-12-27 DIAGNOSIS — R74.8 ABNORMAL LEVELS OF OTHER SERUM ENZYMES: ICD-10-CM

## 2023-12-27 DIAGNOSIS — J10.1 INFLUENZA DUE TO OTHER IDENTIFIED INFLUENZA VIRUS WITH OTHER RESPIRATORY MANIFESTATIONS: ICD-10-CM

## 2023-12-27 DIAGNOSIS — C34.90 MALIGNANT NEOPLASM OF UNSPECIFIED PART OF UNSPECIFIED BRONCHUS OR LUNG: ICD-10-CM

## 2023-12-27 DIAGNOSIS — I63.9 CEREBRAL INFARCTION, UNSPECIFIED: ICD-10-CM

## 2023-12-27 DIAGNOSIS — N18.30 CHRONIC KIDNEY DISEASE, STAGE 3 UNSPECIFIED: ICD-10-CM

## 2023-12-27 DIAGNOSIS — I10 ESSENTIAL (PRIMARY) HYPERTENSION: ICD-10-CM

## 2023-12-27 LAB
A1C WITH ESTIMATED AVERAGE GLUCOSE RESULT: 11.5 % — HIGH (ref 4–5.6)
A1C WITH ESTIMATED AVERAGE GLUCOSE RESULT: 11.5 % — HIGH (ref 4–5.6)
ANION GAP SERPL CALC-SCNC: 8 MMOL/L — SIGNIFICANT CHANGE UP (ref 5–17)
ANION GAP SERPL CALC-SCNC: 8 MMOL/L — SIGNIFICANT CHANGE UP (ref 5–17)
APPEARANCE UR: CLEAR — SIGNIFICANT CHANGE UP
APPEARANCE UR: CLEAR — SIGNIFICANT CHANGE UP
BACTERIA # UR AUTO: ABNORMAL /HPF
BACTERIA # UR AUTO: ABNORMAL /HPF
BILIRUB UR-MCNC: NEGATIVE — SIGNIFICANT CHANGE UP
BILIRUB UR-MCNC: NEGATIVE — SIGNIFICANT CHANGE UP
BLD GP AB SCN SERPL QL: NEGATIVE — SIGNIFICANT CHANGE UP
BLD GP AB SCN SERPL QL: NEGATIVE — SIGNIFICANT CHANGE UP
BUN SERPL-MCNC: 22 MG/DL — SIGNIFICANT CHANGE UP (ref 7–23)
BUN SERPL-MCNC: 22 MG/DL — SIGNIFICANT CHANGE UP (ref 7–23)
CALCIUM SERPL-MCNC: 9.9 MG/DL — SIGNIFICANT CHANGE UP (ref 8.4–10.5)
CALCIUM SERPL-MCNC: 9.9 MG/DL — SIGNIFICANT CHANGE UP (ref 8.4–10.5)
CAST: 0 /LPF — SIGNIFICANT CHANGE UP (ref 0–4)
CAST: 0 /LPF — SIGNIFICANT CHANGE UP (ref 0–4)
CHLORIDE SERPL-SCNC: 99 MMOL/L — SIGNIFICANT CHANGE UP (ref 96–108)
CHLORIDE SERPL-SCNC: 99 MMOL/L — SIGNIFICANT CHANGE UP (ref 96–108)
CHOLEST SERPL-MCNC: 215 MG/DL — HIGH
CHOLEST SERPL-MCNC: 215 MG/DL — HIGH
CO2 SERPL-SCNC: 29 MMOL/L — SIGNIFICANT CHANGE UP (ref 22–31)
CO2 SERPL-SCNC: 29 MMOL/L — SIGNIFICANT CHANGE UP (ref 22–31)
COLOR SPEC: YELLOW — SIGNIFICANT CHANGE UP
COLOR SPEC: YELLOW — SIGNIFICANT CHANGE UP
CREAT ?TM UR-MCNC: 16 MG/DL — SIGNIFICANT CHANGE UP
CREAT ?TM UR-MCNC: 16 MG/DL — SIGNIFICANT CHANGE UP
CREAT SERPL-MCNC: 1.09 MG/DL — SIGNIFICANT CHANGE UP (ref 0.5–1.3)
CREAT SERPL-MCNC: 1.09 MG/DL — SIGNIFICANT CHANGE UP (ref 0.5–1.3)
CYSTATIN C SERPL-MCNC: 1.77 MG/L — HIGH (ref 0.68–1.36)
CYSTATIN C SERPL-MCNC: 1.77 MG/L — HIGH (ref 0.68–1.36)
DIFF PNL FLD: NEGATIVE — SIGNIFICANT CHANGE UP
DIFF PNL FLD: NEGATIVE — SIGNIFICANT CHANGE UP
EGFR: 53 ML/MIN/1.73M2 — LOW
EGFR: 53 ML/MIN/1.73M2 — LOW
ESTIMATED AVERAGE GLUCOSE: 283 MG/DL — HIGH (ref 68–114)
ESTIMATED AVERAGE GLUCOSE: 283 MG/DL — HIGH (ref 68–114)
GFR/BSA.PRED SERPLBLD CYS-BASED-ARV: 32 ML/MIN/1.73M2 — LOW
GFR/BSA.PRED SERPLBLD CYS-BASED-ARV: 32 ML/MIN/1.73M2 — LOW
GGT SERPL-CCNC: 76 U/L — HIGH (ref 8–40)
GGT SERPL-CCNC: 76 U/L — HIGH (ref 8–40)
GLUCOSE BLDC GLUCOMTR-MCNC: 109 MG/DL — HIGH (ref 70–99)
GLUCOSE BLDC GLUCOMTR-MCNC: 109 MG/DL — HIGH (ref 70–99)
GLUCOSE BLDC GLUCOMTR-MCNC: 156 MG/DL — HIGH (ref 70–99)
GLUCOSE BLDC GLUCOMTR-MCNC: 156 MG/DL — HIGH (ref 70–99)
GLUCOSE BLDC GLUCOMTR-MCNC: 227 MG/DL — HIGH (ref 70–99)
GLUCOSE BLDC GLUCOMTR-MCNC: 227 MG/DL — HIGH (ref 70–99)
GLUCOSE BLDC GLUCOMTR-MCNC: 244 MG/DL — HIGH (ref 70–99)
GLUCOSE BLDC GLUCOMTR-MCNC: 244 MG/DL — HIGH (ref 70–99)
GLUCOSE BLDC GLUCOMTR-MCNC: 265 MG/DL — HIGH (ref 70–99)
GLUCOSE BLDC GLUCOMTR-MCNC: 265 MG/DL — HIGH (ref 70–99)
GLUCOSE SERPL-MCNC: 129 MG/DL — HIGH (ref 70–99)
GLUCOSE SERPL-MCNC: 129 MG/DL — HIGH (ref 70–99)
GLUCOSE UR QL: >=1000 MG/DL
GLUCOSE UR QL: >=1000 MG/DL
HDLC SERPL-MCNC: 49 MG/DL — LOW
HDLC SERPL-MCNC: 49 MG/DL — LOW
KETONES UR-MCNC: NEGATIVE MG/DL — SIGNIFICANT CHANGE UP
KETONES UR-MCNC: NEGATIVE MG/DL — SIGNIFICANT CHANGE UP
LEUKOCYTE ESTERASE UR-ACNC: ABNORMAL
LEUKOCYTE ESTERASE UR-ACNC: ABNORMAL
LIPID PNL WITH DIRECT LDL SERPL: 140 MG/DL — HIGH
LIPID PNL WITH DIRECT LDL SERPL: 140 MG/DL — HIGH
MAGNESIUM SERPL-MCNC: 2.7 MG/DL — HIGH (ref 1.6–2.6)
MAGNESIUM SERPL-MCNC: 2.7 MG/DL — HIGH (ref 1.6–2.6)
NITRITE UR-MCNC: POSITIVE
NITRITE UR-MCNC: POSITIVE
NON HDL CHOLESTEROL: 166 MG/DL — HIGH
NON HDL CHOLESTEROL: 166 MG/DL — HIGH
OSMOLALITY UR: 380 MOSM/KG — SIGNIFICANT CHANGE UP (ref 300–900)
OSMOLALITY UR: 380 MOSM/KG — SIGNIFICANT CHANGE UP (ref 300–900)
PH UR: 5.5 — SIGNIFICANT CHANGE UP (ref 5–8)
PH UR: 5.5 — SIGNIFICANT CHANGE UP (ref 5–8)
PHOSPHATE SERPL-MCNC: 2.1 MG/DL — LOW (ref 2.5–4.5)
PHOSPHATE SERPL-MCNC: 2.1 MG/DL — LOW (ref 2.5–4.5)
POTASSIUM SERPL-MCNC: 3.2 MMOL/L — LOW (ref 3.5–5.3)
POTASSIUM SERPL-MCNC: 3.2 MMOL/L — LOW (ref 3.5–5.3)
POTASSIUM SERPL-SCNC: 3.2 MMOL/L — LOW (ref 3.5–5.3)
POTASSIUM SERPL-SCNC: 3.2 MMOL/L — LOW (ref 3.5–5.3)
PROT UR-MCNC: SIGNIFICANT CHANGE UP MG/DL
PROT UR-MCNC: SIGNIFICANT CHANGE UP MG/DL
RBC CASTS # UR COMP ASSIST: 1 /HPF — SIGNIFICANT CHANGE UP (ref 0–4)
RBC CASTS # UR COMP ASSIST: 1 /HPF — SIGNIFICANT CHANGE UP (ref 0–4)
RH IG SCN BLD-IMP: POSITIVE — SIGNIFICANT CHANGE UP
RH IG SCN BLD-IMP: POSITIVE — SIGNIFICANT CHANGE UP
SODIUM SERPL-SCNC: 136 MMOL/L — SIGNIFICANT CHANGE UP (ref 135–145)
SODIUM SERPL-SCNC: 136 MMOL/L — SIGNIFICANT CHANGE UP (ref 135–145)
SODIUM UR-SCNC: 77 MMOL/L — SIGNIFICANT CHANGE UP
SODIUM UR-SCNC: 77 MMOL/L — SIGNIFICANT CHANGE UP
SP GR SPEC: 1.01 — SIGNIFICANT CHANGE UP (ref 1–1.03)
SP GR SPEC: 1.01 — SIGNIFICANT CHANGE UP (ref 1–1.03)
SQUAMOUS # UR AUTO: 2 /HPF — SIGNIFICANT CHANGE UP (ref 0–5)
SQUAMOUS # UR AUTO: 2 /HPF — SIGNIFICANT CHANGE UP (ref 0–5)
TRIGL SERPL-MCNC: 129 MG/DL — SIGNIFICANT CHANGE UP
TRIGL SERPL-MCNC: 129 MG/DL — SIGNIFICANT CHANGE UP
UROBILINOGEN FLD QL: 0.2 MG/DL — SIGNIFICANT CHANGE UP (ref 0.2–1)
UROBILINOGEN FLD QL: 0.2 MG/DL — SIGNIFICANT CHANGE UP (ref 0.2–1)
WBC UR QL: 14 /HPF — HIGH (ref 0–5)
WBC UR QL: 14 /HPF — HIGH (ref 0–5)

## 2023-12-27 RX ORDER — INFLUENZA VIRUS VACCINE 15; 15; 15; 15 UG/.5ML; UG/.5ML; UG/.5ML; UG/.5ML
0.7 SUSPENSION INTRAMUSCULAR ONCE
Refills: 0 | Status: DISCONTINUED | OUTPATIENT
Start: 2023-12-27 | End: 2024-01-03

## 2023-12-27 RX ORDER — BACLOFEN 100 %
5 POWDER (GRAM) MISCELLANEOUS EVERY 12 HOURS
Refills: 0 | Status: DISCONTINUED | OUTPATIENT
Start: 2023-12-27 | End: 2023-12-27

## 2023-12-27 RX ORDER — ERTAPENEM SODIUM 1 G/1
1000 INJECTION, POWDER, LYOPHILIZED, FOR SOLUTION INTRAMUSCULAR; INTRAVENOUS EVERY 24 HOURS
Refills: 0 | Status: COMPLETED | OUTPATIENT
Start: 2023-12-27 | End: 2023-12-29

## 2023-12-27 RX ORDER — ACETAMINOPHEN 500 MG
1 TABLET ORAL
Qty: 0 | Refills: 0 | DISCHARGE

## 2023-12-27 RX ORDER — INSULIN LISPRO 100/ML
VIAL (ML) SUBCUTANEOUS
Refills: 0 | Status: DISCONTINUED | OUTPATIENT
Start: 2023-12-27 | End: 2023-12-28

## 2023-12-27 RX ORDER — ATORVASTATIN CALCIUM 80 MG/1
1 TABLET, FILM COATED ORAL
Qty: 0 | Refills: 0 | DISCHARGE

## 2023-12-27 RX ORDER — INSULIN GLARGINE 100 [IU]/ML
17 INJECTION, SOLUTION SUBCUTANEOUS
Refills: 0 | DISCHARGE

## 2023-12-27 RX ORDER — FERROUS SULFATE 325(65) MG
1 TABLET ORAL
Qty: 0 | Refills: 0 | DISCHARGE

## 2023-12-27 RX ORDER — SODIUM CHLORIDE 9 MG/ML
1000 INJECTION, SOLUTION INTRAVENOUS
Refills: 0 | Status: DISCONTINUED | OUTPATIENT
Start: 2023-12-27 | End: 2024-01-03

## 2023-12-27 RX ORDER — ASPIRIN/CALCIUM CARB/MAGNESIUM 324 MG
1 TABLET ORAL
Qty: 0 | Refills: 0 | DISCHARGE

## 2023-12-27 RX ORDER — CHOLECALCIFEROL (VITAMIN D3) 125 MCG
1 CAPSULE ORAL
Qty: 0 | Refills: 0 | DISCHARGE

## 2023-12-27 RX ORDER — FOLIC ACID 0.8 MG
1 TABLET ORAL DAILY
Refills: 0 | Status: DISCONTINUED | OUTPATIENT
Start: 2023-12-27 | End: 2024-01-03

## 2023-12-27 RX ORDER — PREGABALIN 225 MG/1
1 CAPSULE ORAL
Qty: 0 | Refills: 0 | DISCHARGE

## 2023-12-27 RX ORDER — AMLODIPINE BESYLATE 2.5 MG/1
1 TABLET ORAL
Qty: 0 | Refills: 0 | DISCHARGE

## 2023-12-27 RX ORDER — GLUCAGON INJECTION, SOLUTION 0.5 MG/.1ML
1 INJECTION, SOLUTION SUBCUTANEOUS ONCE
Refills: 0 | Status: DISCONTINUED | OUTPATIENT
Start: 2023-12-27 | End: 2024-01-03

## 2023-12-27 RX ORDER — DEXTROSE 50 % IN WATER 50 %
15 SYRINGE (ML) INTRAVENOUS ONCE
Refills: 0 | Status: DISCONTINUED | OUTPATIENT
Start: 2023-12-27 | End: 2024-01-03

## 2023-12-27 RX ORDER — ACETAMINOPHEN 500 MG
650 TABLET ORAL EVERY 6 HOURS
Refills: 0 | Status: DISCONTINUED | OUTPATIENT
Start: 2023-12-27 | End: 2024-01-03

## 2023-12-27 RX ORDER — SODIUM,POTASSIUM PHOSPHATES 278-250MG
1 POWDER IN PACKET (EA) ORAL ONCE
Refills: 0 | Status: COMPLETED | OUTPATIENT
Start: 2023-12-27 | End: 2023-12-27

## 2023-12-27 RX ORDER — HEPARIN SODIUM 5000 [USP'U]/ML
5000 INJECTION INTRAVENOUS; SUBCUTANEOUS EVERY 8 HOURS
Refills: 0 | Status: DISCONTINUED | OUTPATIENT
Start: 2023-12-27 | End: 2024-01-03

## 2023-12-27 RX ORDER — POTASSIUM CHLORIDE 20 MEQ
40 PACKET (EA) ORAL ONCE
Refills: 0 | Status: COMPLETED | OUTPATIENT
Start: 2023-12-27 | End: 2023-12-27

## 2023-12-27 RX ORDER — HYDROXYCHLOROQUINE SULFATE 200 MG
1 TABLET ORAL
Qty: 0 | Refills: 0 | DISCHARGE

## 2023-12-27 RX ORDER — DEXTROSE 50 % IN WATER 50 %
25 SYRINGE (ML) INTRAVENOUS ONCE
Refills: 0 | Status: DISCONTINUED | OUTPATIENT
Start: 2023-12-27 | End: 2024-01-03

## 2023-12-27 RX ORDER — DEXTROSE 50 % IN WATER 50 %
12.5 SYRINGE (ML) INTRAVENOUS ONCE
Refills: 0 | Status: DISCONTINUED | OUTPATIENT
Start: 2023-12-27 | End: 2024-01-03

## 2023-12-27 RX ORDER — INSULIN GLARGINE 100 [IU]/ML
15 INJECTION, SOLUTION SUBCUTANEOUS AT BEDTIME
Refills: 0 | Status: ACTIVE | OUTPATIENT
Start: 2023-12-27 | End: 2024-11-24

## 2023-12-27 RX ORDER — ASCORBIC ACID 60 MG
1 TABLET,CHEWABLE ORAL
Qty: 0 | Refills: 0 | DISCHARGE

## 2023-12-27 RX ORDER — MEROPENEM 1 G/30ML
1000 INJECTION INTRAVENOUS EVERY 12 HOURS
Refills: 0 | Status: COMPLETED | OUTPATIENT
Start: 2023-12-27 | End: 2023-12-27

## 2023-12-27 RX ORDER — ATORVASTATIN CALCIUM 80 MG/1
40 TABLET, FILM COATED ORAL AT BEDTIME
Refills: 0 | Status: DISCONTINUED | OUTPATIENT
Start: 2023-12-27 | End: 2024-01-03

## 2023-12-27 RX ORDER — FOLIC ACID 0.8 MG
1 TABLET ORAL
Qty: 0 | Refills: 0 | DISCHARGE

## 2023-12-27 RX ORDER — INSULIN ASPART 100 [IU]/ML
5 INJECTION, SOLUTION SUBCUTANEOUS
Refills: 0 | DISCHARGE

## 2023-12-27 RX ORDER — ASPIRIN/CALCIUM CARB/MAGNESIUM 324 MG
81 TABLET ORAL DAILY
Refills: 0 | Status: DISCONTINUED | OUTPATIENT
Start: 2023-12-27 | End: 2023-12-31

## 2023-12-27 RX ORDER — MAGNESIUM SULFATE 500 MG/ML
4 VIAL (ML) INJECTION ONCE
Refills: 0 | Status: COMPLETED | OUTPATIENT
Start: 2023-12-27 | End: 2023-12-27

## 2023-12-27 RX ORDER — AMLODIPINE BESYLATE 2.5 MG/1
10 TABLET ORAL DAILY
Refills: 0 | Status: DISCONTINUED | OUTPATIENT
Start: 2023-12-27 | End: 2024-01-03

## 2023-12-27 RX ORDER — PREGABALIN 225 MG/1
1000 CAPSULE ORAL DAILY
Refills: 0 | Status: DISCONTINUED | OUTPATIENT
Start: 2023-12-27 | End: 2024-01-03

## 2023-12-27 RX ADMIN — INSULIN GLARGINE 15 UNIT(S): 100 INJECTION, SOLUTION SUBCUTANEOUS at 22:08

## 2023-12-27 RX ADMIN — MEROPENEM 100 MILLIGRAM(S): 1 INJECTION INTRAVENOUS at 01:23

## 2023-12-27 RX ADMIN — Medication 25 GRAM(S): at 02:07

## 2023-12-27 RX ADMIN — Medication 6: at 18:17

## 2023-12-27 RX ADMIN — HEPARIN SODIUM 5000 UNIT(S): 5000 INJECTION INTRAVENOUS; SUBCUTANEOUS at 22:08

## 2023-12-27 RX ADMIN — ERTAPENEM SODIUM 120 MILLIGRAM(S): 1 INJECTION, POWDER, LYOPHILIZED, FOR SOLUTION INTRAMUSCULAR; INTRAVENOUS at 15:46

## 2023-12-27 RX ADMIN — Medication 1 MILLIGRAM(S): at 11:36

## 2023-12-27 RX ADMIN — INSULIN GLARGINE 15 UNIT(S): 100 INJECTION, SOLUTION SUBCUTANEOUS at 02:19

## 2023-12-27 RX ADMIN — HEPARIN SODIUM 5000 UNIT(S): 5000 INJECTION INTRAVENOUS; SUBCUTANEOUS at 13:28

## 2023-12-27 RX ADMIN — AMLODIPINE BESYLATE 10 MILLIGRAM(S): 2.5 TABLET ORAL at 01:23

## 2023-12-27 RX ADMIN — HEPARIN SODIUM 5000 UNIT(S): 5000 INJECTION INTRAVENOUS; SUBCUTANEOUS at 06:45

## 2023-12-27 RX ADMIN — Medication 4: at 13:28

## 2023-12-27 RX ADMIN — Medication 40 MILLIEQUIVALENT(S): at 11:36

## 2023-12-27 RX ADMIN — ATORVASTATIN CALCIUM 40 MILLIGRAM(S): 80 TABLET, FILM COATED ORAL at 22:08

## 2023-12-27 RX ADMIN — PREGABALIN 1000 MICROGRAM(S): 225 CAPSULE ORAL at 11:36

## 2023-12-27 RX ADMIN — Medication 1 PACKET(S): at 01:41

## 2023-12-27 NOTE — DIETITIAN INITIAL EVALUATION ADULT - PROBLEM SELECTOR PLAN 7
- elevated since 10/2024, LFTs wnl  No abdominal pain   C/f bone mets iso metastatic NSCLC  - f/u GGT

## 2023-12-27 NOTE — DIETITIAN INITIAL EVALUATION ADULT - PROBLEM SELECTOR PLAN 2
Pt reports dizziness that led to falls x 2 days with head strike. Denies AC use. Pt reports dizziness prior to fall  CTH shows no acute hemorrhage/infarct. EKG showing NSR with APC rate 56  s/p 1L LR  Fall possibly 2/2 vasovagal vs bradycardia vs APC  - f/u orthostatics  - hold toprol 100mg qd  - consider outpatient holter monitor

## 2023-12-27 NOTE — PHYSICAL THERAPY INITIAL EVALUATION ADULT - PERTINENT HX OF CURRENT PROBLEM, REHAB EVAL
73F PMH insulin dpdt TIID, NSCLC adenocarcinoma w mets to brain (s/p RT x1 07/2023, s/p chemo x2 (last tx 11/16/23),R hip replacement, RA, CKD IIIA, HTN and HLD presenting for hyperglycemia and polyuria admitted for resistant UTI.

## 2023-12-27 NOTE — PATIENT PROFILE ADULT - FALL HARM RISK - FACTORS
[facility] OUTPT RADIATION ONCOLOGY  [address] [facility] [address]  Dept Phone: [phone]    Radiation Oncology Clinical Treatment Plan Note    Patient Name:  Olamide Mari  YOB: 1985  MRN:  [id number]  Account Number:  [id number]  Referring Physician:  No ref. 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  Clinical treatment plan was done for the patient for the above diagnosis. Plan section describes the radiation treatment plan and prescription. Orders section lists simulation orders and treatment device as well as treatment planning related orders.    Plan - Intent:   curative - adjuvant    We are planning to deliver adjuvant paraaortic and pelvic radiation therapy.  The planned prescription is listed below.  These elements change at the time of treatment planning or may be altered during the course of therapy based on patient tolerance and disease response.      Radiation Therapy Planning  Treatment Site 1    Treatment intent Curative   Line of treatment Adjuvant   Planned start date 9/21/2020   Treatment site Pelvis (pelvis and para-aortic with SIB)    Technique IMRT   Medical necessity statement The volume of interest is in such a location that its parameters can only be defined by CT.     Prescribed fraction dose 225 cGy    Prescribed total dose 5625 (225 cGy to 5625 cGy to PET positive disease with all at risk areas receiving 180 cGy to 4500 cGy) cGy    Prescribed number of fractions 25    Energy 10    Patient position Supine, head first   Immobilization Other and Cradle (Alpha  cradle - upper and lower)     Boost Details    Boost treatment included Yes   Boost planned start date  10/26/2020   Boost treatment site Pelvis (Residual vaginal disease)    Boost technique Brachytherapy (Interstitial vs. vag cuff brachy depending on response.)   Boost brachytherapy implant type [id number]   Boost prescribed fraction dose 600    Boost prescribed total dose 3000    Boost prescribed number of fractions 5    Boost patient position Supine, head first         Orders:  In order to implement the above plan, the following elements will be required and are therefore requested. Simulation orders are included as well as simulation devices. Treatment plan orders are included for physics staff for treatment planning. Imaging orders are included for setting up treatment fields and verifying the accuracy during the treatment as well.  These may change as needed before or during the treatment.    RO Orders 9/1/2020   2.5MM SLICE THICKNESS Yes   COMPLEX TX DEVICE Yes   COMPUTER PLAN LEVEL - 3D Yes   COMPUTER PLAN LEVEL - IMRT Yes   CONTINUING MEDICAL PHYSICS Yes   DOSIMETRY CALC (QTY) Yes   IMRT MLC Yes   KV/KV DAILY Yes   PELVIS SACN L2 TO MID FEMUR Yes   RADIATION THERAPY Yes   SIMULATION COMPLEX Yes   SPECIAL PHYSICS CONSULTATION - BRACHY CONSULT Yes   SPECIAL TREATMENT PROCEDURE Yes   SUPINE Yes   VAC-VU Yes   VERIFICATION SIMULATION Yes        Evaluation:  As the patient undergoes therapy, this patient will be evaluated at least weekly to assure tolerance to the therapy and so that any of the above elements can be changed as needed. Imaging studies will be done at least weekly using on-board imaging technology as ordered and compared to the reference images for accurate setup of the patient.     Additional Information:  Setup instruction, immobilization device information, contrast information and other simulation details are given below.      Other medical problems/Weakness

## 2023-12-27 NOTE — DIETITIAN INITIAL EVALUATION ADULT - NS FNS DIET ORDER
Diet, Consistent Carbohydrate/No Snacks:   Supplement Feeding Modality:  Oral  Glucerna Shake Cans or Servings Per Day:  1       Frequency:  Daily (12-27-23 @ 14:07)

## 2023-12-27 NOTE — PROGRESS NOTE ADULT - PROBLEM SELECTOR PLAN 3
Reports home . In ED,  Bhb negative. Took prednisone 5mg for the last two days  Home meds lantus 17U, lispro 5U before meals, januvia 100mg. A1c 11.5  s/p 5U humulin in ED  - c/w lantus 15U , moderate iSS with BG monitoring before meals and at bedtime  - CC diet

## 2023-12-27 NOTE — DIETITIAN INITIAL EVALUATION ADULT - OTHER CALCULATIONS
Based on Standards of Care pt within % IBW (119%) thus actual body weight used for all calculations. Needs adjusted for advanced age, cancer, malnutrition.

## 2023-12-27 NOTE — DIETITIAN INITIAL EVALUATION ADULT - PROBLEM SELECTOR PLAN 5
Admission Cr 1.23 (Baseline Cr 0.89-1.01)   Follows with   s/p 1L LR  - f/u repeat Cr  - f/u cystatin c  - hold losartan 100mg due to increase Cr

## 2023-12-27 NOTE — DIETITIAN INITIAL EVALUATION ADULT - PROBLEM SELECTOR PLAN 8
Home meds hydroxychloroquine 200 q12, sulfasalazine 1000mg q12   Takes prednisone 5mg daily as needed for RA flares which she reports she took last two days  - hold home meds iso acute UTI

## 2023-12-27 NOTE — PHYSICAL THERAPY INITIAL EVALUATION ADULT - ADDITIONAL COMMENTS
Limited community ambulator who lives alone in elevator access apartment, +ANDREW +ramp access to enter. Independent with all ADLs prior and ambulates with rollator. Has Eduardo visiting RN and PT services.

## 2023-12-27 NOTE — PROGRESS NOTE ADULT - PROBLEM SELECTOR PLAN 1
Presenting with persistent dysuria and polyuria  Presented to [facility] ED 11/16 for UTI discharged on cefpodoxime x 14 days which pt completed  Ucx 11/16 and 12/14 showed 100k  klebsiella oxytoca/raoutella ornithinolytica resistant to CTX. Was taking farxiga, prednisone 5mg   Afebrile, not septic  Reports  - given meropenem 1g (CrCl 39)- call for antibiotic approval in AM  - hold farxiga iso UTI Presenting with persistent dysuria and polyuria  Presented to [facility] ED 11/16 for UTI discharged on cefpodoxime x 14 days which pt completed  Ucx 11/16 and 12/14 showed 100k  klebsiella oxytoca/raoutella ornithinolytica resistant to CTX. Was taking farxiga, prednisone 5mg   Afebrile, not septic  Reports  - given meropenem 1g (CrCl 39)- call for antibiotic approval in AM  - hold farxiga iso UTI Presenting with persistent dysuria and polyuria  Presented to [facility] ED 11/16 for UTI discharged on cefpodoxime x 14 days which pt completed  Ucx 11/16 and 12/14 showed 100k  klebsiella oxytoca/raoutella ornithinolytica resistant to CTX. Was taking farxiga, prednisone 5mg   Afebrile, not septic  Reports  - s/p Dose of meropenum   - ID approval for ertapenem 3 day course, f/u once sensitivties arise   - hold farxiga iso UTI Presenting with persistent dysuria and polyuria  Presented to [facility] ED 11/16 for UTI discharged on cefpodoxime x 14 days which pt completed  Ucx 11/16 and 12/14 showed 100k  klebsiella oxytoca/raoutella ornithinolytica resistant to CTX. Was taking farxiga, prednisone 5mg   Afebrile, not septic  Reports  - s/p Dose of meropenum   - ID approval for ertapenem 3 day course, f/u once sensitivties arise   - hold farxiga iso UTI

## 2023-12-27 NOTE — DIETITIAN INITIAL EVALUATION ADULT - OTHER INFO
73F PMH insulin dpdt TIID, NSCLC adenocarcinoma w mets to brain (s/p RT x1 07/2023, s/p chemo x2 (last tx 11/16/23),R hip replacement, RA, CKD IIIA, HTN and HLD presenting for persist dysuria and polyuria. She reports this morning she felt dizzy and checked her blood sugar which was 514.  Pt reports fall yesterday and the day before when she experienced dizziness and lost consciousness "for a second". She reports suprapubic tenderness and foul  smelling urine. She was seen in [facility] ED 12/14 for hyperglycemia 500s and UTI discharged cefpodoxime x 14 days which she reported she finished. Last UCx 12/14 showing Ucx >100k klebsiella oxytoca/raoutella ornithinolytica R to CTX. She reports she has been taking prednisone 5mg for last two days because it was cloudy and she was concerned for RA flare.    Patient seen at bedside for nutrition assessment. Current diet order: consistent carbohydrates. Reports allergy to citrus which causes a rash. Reports she does not have her bottom dentures so softer foods are easier. Reports good appetite, consumed 50-75% of breakfast which included fruit, eggs, oatmeal. Reports she was eating well PTA. Patient agreeable to Glucerna 1x/day. Provided nutrition education discussing importance of adequate protein, food sources of protein. Reports she received carbohydrate handout during previous admission. Dosing weight: 137 pounds, BMI 24.3. Per [facility], patient weighed 156 pounds in June 2023 indicating 19lb/12% weight loss x6 months. No pressure injuries documented. No edema documented. Denies N/V/D/C. HgbA1c 11.5%, hypokalemic, hypophosphatemic, elevated cholesterol, low HDL, elevated LDL. Meds: antibiotic, insulin, vitamin B12, folic acid. Observed with moderate muscle wasting to temples and clavicles. Based on ASPEN guidelines, pt meets criteria for severe malnutrition. No cultural, ethnic, Religion food preferences reported. See nutrition recommendations below.  73F PMH insulin dpdt TIID, NSCLC adenocarcinoma w mets to brain (s/p RT x1 07/2023, s/p chemo x2 (last tx 11/16/23),R hip replacement, RA, CKD IIIA, HTN and HLD presenting for persist dysuria and polyuria. She reports this morning she felt dizzy and checked her blood sugar which was 514.  Pt reports fall yesterday and the day before when she experienced dizziness and lost consciousness "for a second". She reports suprapubic tenderness and foul  smelling urine. She was seen in [facility] ED 12/14 for hyperglycemia 500s and UTI discharged cefpodoxime x 14 days which she reported she finished. Last UCx 12/14 showing Ucx >100k klebsiella oxytoca/raoutella ornithinolytica R to CTX. She reports she has been taking prednisone 5mg for last two days because it was cloudy and she was concerned for RA flare.    Patient seen at bedside for nutrition assessment. Current diet order: consistent carbohydrates. Reports allergy to citrus which causes a rash. Reports she does not have her bottom dentures so softer foods are easier. Reports good appetite, consumed 50-75% of breakfast which included fruit, eggs, oatmeal. Reports she was eating well PTA. Patient agreeable to Glucerna 1x/day. Provided nutrition education discussing importance of adequate protein, food sources of protein. Reports she received carbohydrate handout during previous admission. Dosing weight: 137 pounds, BMI 24.3. Per [facility], patient weighed 156 pounds in June 2023 indicating 19lb/12% weight loss x6 months. No pressure injuries documented. No edema documented. Denies N/V/D/C. HgbA1c 11.5%, hypokalemic, hypophosphatemic, elevated cholesterol, low HDL, elevated LDL. Meds: antibiotic, insulin, vitamin B12, folic acid. Observed with moderate muscle wasting to temples and clavicles. Based on ASPEN guidelines, pt meets criteria for severe malnutrition. No cultural, ethnic, Moravian food preferences reported. See nutrition recommendations below.

## 2023-12-27 NOTE — DIETITIAN NUTRITION RISK NOTIFICATION - ADDITIONAL COMMENTS/DIETITIAN RECOMMENDATIONS
Continue with consistent carbohydrate diet. Recommend to add Glucerna 1x/day (220 kcal,10 g protein per serving). Recommend MVI.

## 2023-12-27 NOTE — DIETITIAN INITIAL EVALUATION ADULT - PROBLEM SELECTOR PLAN 6
Follows with Dr Delaney  Last chemo session 11/16- has been holding chemo sessions due to hyperglycemia  MR head 10/23 showing R posterior frontal enhancing lesion with vasogenic edema which resolved on [facility] 10/23  Has port placed at [facility] 9/2023 per patient- site clean and dry reports last accessed 11/2023 for possible chemo  - f/u outpatient

## 2023-12-27 NOTE — DIETITIAN INITIAL EVALUATION ADULT - PERTINENT MEDS FT
MEDICATIONS  (STANDING):  amLODIPine   Tablet 10 milliGRAM(s) Oral daily  atorvastatin 40 milliGRAM(s) Oral at bedtime  cyanocobalamin 1000 MICROGram(s) Oral daily  dextrose 5%. 1000 milliLiter(s) (100 mL/Hr) IV Continuous <Continuous>  dextrose 5%. 1000 milliLiter(s) (50 mL/Hr) IV Continuous <Continuous>  dextrose 50% Injectable 12.5 Gram(s) IV Push once  dextrose 50% Injectable 25 Gram(s) IV Push once  dextrose 50% Injectable 25 Gram(s) IV Push once  folic acid 1 milliGRAM(s) Oral daily  glucagon  Injectable 1 milliGRAM(s) IntraMuscular once  heparin   Injectable 5000 Unit(s) SubCutaneous every 8 hours  influenza  Vaccine (HIGH DOSE) 0.7 milliLiter(s) IntraMuscular once  insulin glargine Injectable (LANTUS) 15 Unit(s) SubCutaneous at bedtime  insulin lispro (ADMELOG) corrective regimen sliding scale   SubCutaneous three times a day before meals    MEDICATIONS  (PRN):  acetaminophen     Tablet .. 650 milliGRAM(s) Oral every 6 hours PRN Temp greater or equal to 38C (100.4F), Mild Pain (1 - 3)  dextrose Oral Gel 15 Gram(s) Oral once PRN Blood Glucose LESS THAN 70 milliGRAM(s)/deciliter

## 2023-12-27 NOTE — PHYSICAL THERAPY INITIAL EVALUATION ADULT - GAIT DEVIATIONS NOTED, PT EVAL
Fairly steady gait with rollator, no LOB observed. Fair navigation of turns and room obstacles./decreased gaby/increased time in double stance/decreased velocity of limb motion/decreased step length/decreased stride length/decreased weight-shifting ability

## 2023-12-27 NOTE — PATIENT PROFILE ADULT - FUNCTIONAL ASSESSMENT - BASIC MOBILITY 6.
2-calculated by average/Not able to assess (calculate score using [facility] averaging method) 2-calculated by average/Not able to assess (calculate score using [facility] averaging method)

## 2023-12-27 NOTE — DIETITIAN INITIAL EVALUATION ADULT - PROBLEM SELECTOR PLAN 3
Reports home . In ED,  Bhb negative. Took prednisone 5mg for the last two days  Home meds lantus 17U, lispro 5U before meals, januvia 100mg,   s/p 5U humulin in ED  - c/w lantus 15U , moderate iSS with BG monitoring before meals and at bedtime  - f/u A1C  - CC diet

## 2023-12-27 NOTE — DIETITIAN INITIAL EVALUATION ADULT - PROBLEM SELECTOR PLAN 4
Reports she had flu vaccine 2 weeks ago and has had cough since then. Denies fever, chills, sick contacts  Afebrile, mild leukocytosis  s/p 1L LR  - c/w PO fluids   - c/w supportive measures

## 2023-12-27 NOTE — PATIENT PROFILE ADULT - FALL HARM RISK - HARM RISK INTERVENTIONS
Assistance with ambulation/Assistance OOB with selected safe patient handling equipment/Communicate Risk of Fall with Harm to all staff/Discuss with provider need for PT consult/Monitor gait and stability/Provide patient with walking aids - walker, cane, crutches/Reinforce activity limits and safety measures with patient and family/Tailored Fall Risk Interventions/Visual Cue: Yellow wristband and red socks/Bed in lowest position, wheels locked, appropriate side rails in place/Call bell, personal items and telephone in reach/Instruct patient to call for assistance before getting out of bed or chair/Non-slip footwear when patient is out of bed/[place] to call system/Physically safe environment - no spills, clutter or unnecessary equipment/Purposeful Proactive Rounding/Room/bathroom lighting operational, light cord in reach Assistance with ambulation/Assistance OOB with selected safe patient handling equipment/Communicate Risk of Fall with Harm to all staff/Discuss with provider need for PT consult/Monitor gait and stability/Provide patient with walking aids - walker, cane, crutches/Reinforce activity limits and safety measures with patient and family/Tailored Fall Risk Interventions/Visual Cue: Yellow wristband and red socks/Bed in lowest position, wheels locked, appropriate side rails in place/Call bell, personal items and telephone in reach/Instruct patient to call for assistance before getting out of bed or chair/Non-slip footwear when patient is out of bed/[place] to call system/Physically safe environment - no spills, clutter or unnecessary equipment/Purposeful Proactive Rounding/Room/bathroom lighting operational, light cord in reach

## 2023-12-27 NOTE — PATIENT PROFILE ADULT - NSPROHMDIABETBLDGLCUSUAL_GEN_A_NUR
[employer] Pregnancy And Lactation Text: This medication is Pregnancy Category X and contraindicated in pregnancy and in women who may become pregnant. It is unknown if this medication is excreted in breast milk. known

## 2023-12-27 NOTE — DIETITIAN NUTRITION RISK NOTIFICATION - TREATMENT: THE FOLLOWING DIET HAS BEEN RECOMMENDED
Diet, Consistent Carbohydrate/No Snacks:   Supplement Feeding Modality:  Oral  Glucerna Shake Cans or Servings Per Day:  1       Frequency:  Daily (12-27-23 @ 14:07) [Active]

## 2023-12-27 NOTE — PROGRESS NOTE ADULT - SUBJECTIVE AND OBJECTIVE BOX
**INCOMPLETE NOTE    OVERNIGHT EVENTS:    SUBJECTIVE:  Patient seen and examined at bedside. NAD, says she is feeling much better   ROS: Patient denies h/n/v/d, fever, chills, cp, palpitations, sob, abd pain, leg swelling, rashes, dysuria, and changes in BM.     Vital Signs Last 12 Hrs  T(F): 97.7 (12-27-23 @ 08:59), Max: 98.2 (12-27-23 @ 00:55)  HR: 55 (12-27-23 @ 08:59) (51 - 64)  BP: 168/78 (12-27-23 @ 08:59) (151/77 - 173/79)  BP(mean): --  RR: 18 (12-27-23 @ 08:59) (18 - 18)  SpO2: 96% (12-27-23 @ 08:59) (96% - 98%)  I&O's Summary    26 Dec 2023 07:01  -  27 Dec 2023 07:00  --------------------------------------------------------  IN: 0 mL / OUT: 275 mL / NET: -275 mL        PHYSICAL EXAM:  Constitutional: NAD, comfortable in bed.  HEENT: NC/AT, PERRLA, EOMI, no conjunctival pallor or scleral icterus, MMM  Neck: Supple, no JVD  Respiratory: CTA B/L. No w/r/r.   Cardiovascular: RRR, normal S1 and S2, no m/r/g.   Gastrointestinal: +BS, soft NTND, no guarding or rebound tenderness, no palpable masses   Extremities: wwp; no cyanosis, clubbing or edema.   Vascular: Pulses equal and strong throughout.   Neurological: AAOx3, no CN deficits, strength and sensation intact throughout.   Skin: No gross skin abnormalities or rashes        LABS:                        11.6   10.13 )-----------( 184      ( 26 Dec 2023 20:34 )             36.4     12-27    136  |  99  |  22  ----------------------------<  129<H>  3.2<L>   |  29  |  1.09    Ca    9.9      27 Dec 2023 05:30  Phos  2.1     12-27  Mg     2.7     12-27    TPro  7.5  /  Alb  3.9  /  TBili  0.4  /  DBili  x   /  AST  16  /  ALT  16  /  AlkPhos  155<H>  12-26      Urinalysis Basic - ( 27 Dec 2023 05:30 )    Color: x / Appearance: x / SG: x / pH: x  Gluc: 129 mg/dL / Ketone: x  / Bili: x / Urobili: x   Blood: x / Protein: x / Nitrite: x   Leuk Esterase: x / RBC: x / WBC x   Sq Epi: x / Non Sq Epi: x / Bacteria: x          RADIOLOGY & ADDITIONAL TESTS:    MEDICATIONS  (STANDING):  amLODIPine   Tablet 10 milliGRAM(s) Oral daily  atorvastatin 40 milliGRAM(s) Oral at bedtime  cyanocobalamin 1000 MICROGram(s) Oral daily  dextrose 5%. 1000 milliLiter(s) (100 mL/Hr) IV Continuous <Continuous>  dextrose 5%. 1000 milliLiter(s) (50 mL/Hr) IV Continuous <Continuous>  dextrose 50% Injectable 12.5 Gram(s) IV Push once  dextrose 50% Injectable 25 Gram(s) IV Push once  dextrose 50% Injectable 25 Gram(s) IV Push once  folic acid 1 milliGRAM(s) Oral daily  glucagon  Injectable 1 milliGRAM(s) IntraMuscular once  heparin   Injectable 5000 Unit(s) SubCutaneous every 8 hours  influenza  Vaccine (HIGH DOSE) 0.7 milliLiter(s) IntraMuscular once  insulin glargine Injectable (LANTUS) 15 Unit(s) SubCutaneous at bedtime  insulin lispro (ADMELOG) corrective regimen sliding scale   SubCutaneous three times a day before meals    MEDICATIONS  (PRN):  acetaminophen     Tablet .. 650 milliGRAM(s) Oral every 6 hours PRN Temp greater or equal to 38C (100.4F), Mild Pain (1 - 3)  dextrose Oral Gel 15 Gram(s) Oral once PRN Blood Glucose LESS THAN 70 milliGRAM(s)/deciliter   OVERNIGHT EVENTS: CYNDY     SUBJECTIVE:  Patient seen and examined at bedside. NAD, says she is feeling much better   ROS: Patient denies h/n/v/d, fever, chills, cp, palpitations, sob, abd pain, leg swelling, rashes, dysuria, and changes in BM.     Vital Signs Last 12 Hrs  T(F): 97.7 (12-27-23 @ 08:59), Max: 98.2 (12-27-23 @ 00:55)  HR: 55 (12-27-23 @ 08:59) (51 - 64)  BP: 168/78 (12-27-23 @ 08:59) (151/77 - 173/79)  BP(mean): --  RR: 18 (12-27-23 @ 08:59) (18 - 18)  SpO2: 96% (12-27-23 @ 08:59) (96% - 98%)  I&O's Summary    26 Dec 2023 07:01  -  27 Dec 2023 07:00  --------------------------------------------------------  IN: 0 mL / OUT: 275 mL / NET: -275 mL        PHYSICAL EXAM:  Constitutional: NAD, comfortable in bed.  HEENT: NC/AT, PERRLA, EOMI, no conjunctival pallor or scleral icterus, MMM  Neck: Supple, no JVD  Respiratory: CTA B/L. No w/r/r.   Cardiovascular: RRR, normal S1 and S2, no m/r/g.   Gastrointestinal: +BS, soft NTND, no guarding or rebound tenderness, no palpable masses   Extremities: wwp; no cyanosis, clubbing or edema.   Vascular: Pulses equal and strong throughout.   Neurological: AAOx3, no CN deficits, strength and sensation intact throughout.   Skin: No gross skin abnormalities or rashes        LABS:                        11.6   10.13 )-----------( 184      ( 26 Dec 2023 20:34 )             36.4     12-27    136  |  99  |  22  ----------------------------<  129<H>  3.2<L>   |  29  |  1.09    Ca    9.9      27 Dec 2023 05:30  Phos  2.1     12-27  Mg     2.7     12-27    TPro  7.5  /  Alb  3.9  /  TBili  0.4  /  DBili  x   /  AST  16  /  ALT  16  /  AlkPhos  155<H>  12-26      Urinalysis Basic - ( 27 Dec 2023 05:30 )    Color: x / Appearance: x / SG: x / pH: x  Gluc: 129 mg/dL / Ketone: x  / Bili: x / Urobili: x   Blood: x / Protein: x / Nitrite: x   Leuk Esterase: x / RBC: x / WBC x   Sq Epi: x / Non Sq Epi: x / Bacteria: x          RADIOLOGY & ADDITIONAL TESTS:    MEDICATIONS  (STANDING):  amLODIPine   Tablet 10 milliGRAM(s) Oral daily  atorvastatin 40 milliGRAM(s) Oral at bedtime  cyanocobalamin 1000 MICROGram(s) Oral daily  dextrose 5%. 1000 milliLiter(s) (100 mL/Hr) IV Continuous <Continuous>  dextrose 5%. 1000 milliLiter(s) (50 mL/Hr) IV Continuous <Continuous>  dextrose 50% Injectable 12.5 Gram(s) IV Push once  dextrose 50% Injectable 25 Gram(s) IV Push once  dextrose 50% Injectable 25 Gram(s) IV Push once  folic acid 1 milliGRAM(s) Oral daily  glucagon  Injectable 1 milliGRAM(s) IntraMuscular once  heparin   Injectable 5000 Unit(s) SubCutaneous every 8 hours  influenza  Vaccine (HIGH DOSE) 0.7 milliLiter(s) IntraMuscular once  insulin glargine Injectable (LANTUS) 15 Unit(s) SubCutaneous at bedtime  insulin lispro (ADMELOG) corrective regimen sliding scale   SubCutaneous three times a day before meals    MEDICATIONS  (PRN):  acetaminophen     Tablet .. 650 milliGRAM(s) Oral every 6 hours PRN Temp greater or equal to 38C (100.4F), Mild Pain (1 - 3)  dextrose Oral Gel 15 Gram(s) Oral once PRN Blood Glucose LESS THAN 70 milliGRAM(s)/deciliter

## 2023-12-27 NOTE — DIETITIAN INITIAL EVALUATION ADULT - PERTINENT LABORATORY DATA
12-27    136  |  99  |  22  ----------------------------<  129<H>  3.2<L>   |  29  |  1.09    Ca    9.9      27 Dec 2023 05:30  Phos  2.1     12-27  Mg     2.7     12-27    TPro  7.5  /  Alb  3.9  /  TBili  0.4  /  DBili  x   /  AST  16  /  ALT  16  /  AlkPhos  155<H>  12-26  POCT Blood Glucose.: 227 mg/dL (12-27-23 @ 13:10)  A1C with Estimated Average Glucose Result: 11.5 % (12-27-23 @ 05:30)  A1C with Estimated Average Glucose Result: 10.0 % (10-08-23 @ 05:30)  A1C with Estimated Average Glucose Result: 7.4 % (07-10-23 @ 05:30)

## 2023-12-27 NOTE — PHYSICAL THERAPY INITIAL EVALUATION ADULT - GENERAL OBSERVATIONS, REHAB EVAL
PT IE completed. Patient received semi supine in bed +heplock IV, +melanie, NAD, willing to work with PT.

## 2023-12-27 NOTE — DIETITIAN INITIAL EVALUATION ADULT - ADD RECOMMEND
1. Continue with consistent carbohydrate diet. Recommend to add Glucerna 1x/day (220 kcal,10 g protein per serving). Recommend MVI.   2. Encourage pt to meet nutritional needs as able   3. Monitor labs: electrolytes, cmp  4. Monitor weights   5. Pain and bowel regimen per team   6. Will continue to assess/honor food preferences as able  7. Monitor adherence to diet education   *discussed with team

## 2023-12-27 NOTE — CHART NOTE - NSCHARTNOTEFT_GEN_A_CORE
Infectious Diseases Anti-infective Approval Note    Medication: ertapenem  Dose: 1g  Route: IV  Frequency: q24h  Duration**: 3 days    *THIS IS NOT AN INFECTIOUS DISEASES CONSULTATION*    **Indicates duration of approval, not necessarily duration of treatment**

## 2023-12-27 NOTE — DIETITIAN INITIAL EVALUATION ADULT - PROBLEM SELECTOR PLAN 1
Presenting with persistent dysuria and polyuria  Presented to [facility] ED 11/16 for UTI discharged on cefpodoxime x 14 days which pt completed  Ucx 11/16 and 12/14 showed 100k  klebsiella oxytoca/raoutella ornithinolytica resistant to CTX. Was taking farxiga, prednisone 5mg   Afebrile, not septic  Reports  - f/u bcx 12/27  - f/u Ucx (collected prior to abx)  - given meropenem 1g (CrCl 39)- call for antibiotic approval in AM  - hold farxiga iso UTI Presenting with persistent dysuria and polyuria  Presented to [facility] ED 11/16 for UTI discharged on cefpodoxime x 14 days which pt completed  Ucx 11/16 and 12/14 showed 100k  klebsiella oxytoca/raoutella ornithinolytica resistant to CTX. Was taking farxiga, prednisone 5mg   Afebrile, not septic  Reports  - f/u bcx 12/27  - f/u Ucx (collected prior to abx)  - given meropenem 1g (CrCl 39)- call for antibiotic approval in AM  - hold farxiga iso UTI

## 2023-12-28 ENCOUNTER — APPOINTMENT (OUTPATIENT)
Dept: HEMATOLOGY ONCOLOGY | Facility: CLINIC | Age: 73
End: 2023-12-28

## 2023-12-28 LAB
-  AMPICILLIN/SULBACTAM: SIGNIFICANT CHANGE UP
-  AMPICILLIN/SULBACTAM: SIGNIFICANT CHANGE UP
-  AMPICILLIN: SIGNIFICANT CHANGE UP
-  AMPICILLIN: SIGNIFICANT CHANGE UP
-  CEFAZOLIN: SIGNIFICANT CHANGE UP
-  CEFAZOLIN: SIGNIFICANT CHANGE UP
-  CEFTRIAXONE: SIGNIFICANT CHANGE UP
-  CEFTRIAXONE: SIGNIFICANT CHANGE UP
-  CIPROFLOXACIN: SIGNIFICANT CHANGE UP
-  CIPROFLOXACIN: SIGNIFICANT CHANGE UP
-  ERTAPENEM: SIGNIFICANT CHANGE UP
-  ERTAPENEM: SIGNIFICANT CHANGE UP
-  GENTAMICIN: SIGNIFICANT CHANGE UP
-  GENTAMICIN: SIGNIFICANT CHANGE UP
-  MEROPENEM: SIGNIFICANT CHANGE UP
-  MEROPENEM: SIGNIFICANT CHANGE UP
-  NITROFURANTOIN: SIGNIFICANT CHANGE UP
-  NITROFURANTOIN: SIGNIFICANT CHANGE UP
-  PIPERACILLIN/TAZOBACTAM: SIGNIFICANT CHANGE UP
-  PIPERACILLIN/TAZOBACTAM: SIGNIFICANT CHANGE UP
-  TOBRAMYCIN: SIGNIFICANT CHANGE UP
-  TOBRAMYCIN: SIGNIFICANT CHANGE UP
-  TRIMETHOPRIM/SULFAMETHOXAZOLE: SIGNIFICANT CHANGE UP
-  TRIMETHOPRIM/SULFAMETHOXAZOLE: SIGNIFICANT CHANGE UP
ALBUMIN SERPL ELPH-MCNC: 3 G/DL — LOW (ref 3.3–5)
ALBUMIN SERPL ELPH-MCNC: 3 G/DL — LOW (ref 3.3–5)
ALP SERPL-CCNC: 85 U/L — SIGNIFICANT CHANGE UP (ref 40–120)
ALP SERPL-CCNC: 85 U/L — SIGNIFICANT CHANGE UP (ref 40–120)
ALT FLD-CCNC: 12 U/L — SIGNIFICANT CHANGE UP (ref 10–45)
ALT FLD-CCNC: 12 U/L — SIGNIFICANT CHANGE UP (ref 10–45)
ANION GAP SERPL CALC-SCNC: 7 MMOL/L — SIGNIFICANT CHANGE UP (ref 5–17)
ANION GAP SERPL CALC-SCNC: 7 MMOL/L — SIGNIFICANT CHANGE UP (ref 5–17)
AST SERPL-CCNC: 14 U/L — SIGNIFICANT CHANGE UP (ref 10–40)
AST SERPL-CCNC: 14 U/L — SIGNIFICANT CHANGE UP (ref 10–40)
BILIRUB SERPL-MCNC: 0.3 MG/DL — SIGNIFICANT CHANGE UP (ref 0.2–1.2)
BILIRUB SERPL-MCNC: 0.3 MG/DL — SIGNIFICANT CHANGE UP (ref 0.2–1.2)
BUN SERPL-MCNC: 27 MG/DL — HIGH (ref 7–23)
BUN SERPL-MCNC: 27 MG/DL — HIGH (ref 7–23)
CALCIUM SERPL-MCNC: 9.1 MG/DL — SIGNIFICANT CHANGE UP (ref 8.4–10.5)
CALCIUM SERPL-MCNC: 9.1 MG/DL — SIGNIFICANT CHANGE UP (ref 8.4–10.5)
CHLORIDE SERPL-SCNC: 99 MMOL/L — SIGNIFICANT CHANGE UP (ref 96–108)
CHLORIDE SERPL-SCNC: 99 MMOL/L — SIGNIFICANT CHANGE UP (ref 96–108)
CO2 SERPL-SCNC: 29 MMOL/L — SIGNIFICANT CHANGE UP (ref 22–31)
CO2 SERPL-SCNC: 29 MMOL/L — SIGNIFICANT CHANGE UP (ref 22–31)
CREAT SERPL-MCNC: 1.23 MG/DL — SIGNIFICANT CHANGE UP (ref 0.5–1.3)
CREAT SERPL-MCNC: 1.23 MG/DL — SIGNIFICANT CHANGE UP (ref 0.5–1.3)
EGFR: 46 ML/MIN/1.73M2 — LOW
EGFR: 46 ML/MIN/1.73M2 — LOW
GLUCOSE BLDC GLUCOMTR-MCNC: 186 MG/DL — HIGH (ref 70–99)
GLUCOSE BLDC GLUCOMTR-MCNC: 186 MG/DL — HIGH (ref 70–99)
GLUCOSE BLDC GLUCOMTR-MCNC: 283 MG/DL — HIGH (ref 70–99)
GLUCOSE BLDC GLUCOMTR-MCNC: 283 MG/DL — HIGH (ref 70–99)
GLUCOSE BLDC GLUCOMTR-MCNC: 286 MG/DL — HIGH (ref 70–99)
GLUCOSE BLDC GLUCOMTR-MCNC: 286 MG/DL — HIGH (ref 70–99)
GLUCOSE BLDC GLUCOMTR-MCNC: 386 MG/DL — HIGH (ref 70–99)
GLUCOSE BLDC GLUCOMTR-MCNC: 386 MG/DL — HIGH (ref 70–99)
GLUCOSE SERPL-MCNC: 164 MG/DL — HIGH (ref 70–99)
GLUCOSE SERPL-MCNC: 164 MG/DL — HIGH (ref 70–99)
HCT VFR BLD CALC: 36.1 % — SIGNIFICANT CHANGE UP (ref 34.5–45)
HCT VFR BLD CALC: 36.1 % — SIGNIFICANT CHANGE UP (ref 34.5–45)
HGB BLD-MCNC: 11.6 G/DL — SIGNIFICANT CHANGE UP (ref 11.5–15.5)
HGB BLD-MCNC: 11.6 G/DL — SIGNIFICANT CHANGE UP (ref 11.5–15.5)
MAGNESIUM SERPL-MCNC: 1.8 MG/DL — SIGNIFICANT CHANGE UP (ref 1.6–2.6)
MAGNESIUM SERPL-MCNC: 1.8 MG/DL — SIGNIFICANT CHANGE UP (ref 1.6–2.6)
MCHC RBC-ENTMCNC: 29.4 PG — SIGNIFICANT CHANGE UP (ref 27–34)
MCHC RBC-ENTMCNC: 29.4 PG — SIGNIFICANT CHANGE UP (ref 27–34)
MCHC RBC-ENTMCNC: 32.1 GM/DL — SIGNIFICANT CHANGE UP (ref 32–36)
MCHC RBC-ENTMCNC: 32.1 GM/DL — SIGNIFICANT CHANGE UP (ref 32–36)
MCV RBC AUTO: 91.4 FL — SIGNIFICANT CHANGE UP (ref 80–100)
MCV RBC AUTO: 91.4 FL — SIGNIFICANT CHANGE UP (ref 80–100)
METHOD TYPE: SIGNIFICANT CHANGE UP
METHOD TYPE: SIGNIFICANT CHANGE UP
NRBC # BLD: 0 /100 WBCS — SIGNIFICANT CHANGE UP (ref 0–0)
NRBC # BLD: 0 /100 WBCS — SIGNIFICANT CHANGE UP (ref 0–0)
PHOSPHATE SERPL-MCNC: 1.9 MG/DL — LOW (ref 2.5–4.5)
PHOSPHATE SERPL-MCNC: 1.9 MG/DL — LOW (ref 2.5–4.5)
PLATELET # BLD AUTO: 187 K/UL — SIGNIFICANT CHANGE UP (ref 150–400)
PLATELET # BLD AUTO: 187 K/UL — SIGNIFICANT CHANGE UP (ref 150–400)
POTASSIUM SERPL-MCNC: 4.1 MMOL/L — SIGNIFICANT CHANGE UP (ref 3.5–5.3)
POTASSIUM SERPL-MCNC: 4.1 MMOL/L — SIGNIFICANT CHANGE UP (ref 3.5–5.3)
POTASSIUM SERPL-SCNC: 4.1 MMOL/L — SIGNIFICANT CHANGE UP (ref 3.5–5.3)
POTASSIUM SERPL-SCNC: 4.1 MMOL/L — SIGNIFICANT CHANGE UP (ref 3.5–5.3)
PROT SERPL-MCNC: 6.4 G/DL — SIGNIFICANT CHANGE UP (ref 6–8.3)
PROT SERPL-MCNC: 6.4 G/DL — SIGNIFICANT CHANGE UP (ref 6–8.3)
RBC # BLD: 3.95 M/UL — SIGNIFICANT CHANGE UP (ref 3.8–5.2)
RBC # BLD: 3.95 M/UL — SIGNIFICANT CHANGE UP (ref 3.8–5.2)
RBC # FLD: 14.6 % — HIGH (ref 10.3–14.5)
RBC # FLD: 14.6 % — HIGH (ref 10.3–14.5)
SODIUM SERPL-SCNC: 135 MMOL/L — SIGNIFICANT CHANGE UP (ref 135–145)
SODIUM SERPL-SCNC: 135 MMOL/L — SIGNIFICANT CHANGE UP (ref 135–145)
WBC # BLD: 6.64 K/UL — SIGNIFICANT CHANGE UP (ref 3.8–10.5)
WBC # BLD: 6.64 K/UL — SIGNIFICANT CHANGE UP (ref 3.8–10.5)
WBC # FLD AUTO: 6.64 K/UL — SIGNIFICANT CHANGE UP (ref 3.8–10.5)
WBC # FLD AUTO: 6.64 K/UL — SIGNIFICANT CHANGE UP (ref 3.8–10.5)

## 2023-12-28 PROCEDURE — 99222 1ST HOSP IP/OBS MODERATE 55: CPT

## 2023-12-28 PROCEDURE — 99233 SBSQ HOSP IP/OBS HIGH 50: CPT | Mod: GC

## 2023-12-28 PROCEDURE — 99222 1ST HOSP IP/OBS MODERATE 55: CPT | Mod: GC

## 2023-12-28 RX ORDER — MAGNESIUM OXIDE 400 MG ORAL TABLET 241.3 MG
400 TABLET ORAL ONCE
Refills: 0 | Status: COMPLETED | OUTPATIENT
Start: 2023-12-28 | End: 2023-12-28

## 2023-12-28 RX ORDER — DEXTROSE 50 % IN WATER 50 %
15 SYRINGE (ML) INTRAVENOUS ONCE
Refills: 0 | Status: DISCONTINUED | OUTPATIENT
Start: 2023-12-28 | End: 2024-01-03

## 2023-12-28 RX ORDER — SULFASALAZINE 500 MG
1000 TABLET ORAL
Refills: 0 | Status: DISCONTINUED | OUTPATIENT
Start: 2023-12-28 | End: 2024-01-03

## 2023-12-28 RX ORDER — INSULIN LISPRO 100/ML
VIAL (ML) SUBCUTANEOUS
Refills: 0 | Status: DISCONTINUED | OUTPATIENT
Start: 2023-12-28 | End: 2023-12-29

## 2023-12-28 RX ORDER — INSULIN GLARGINE 100 [IU]/ML
15 INJECTION, SOLUTION SUBCUTANEOUS AT BEDTIME
Refills: 0 | Status: DISCONTINUED | OUTPATIENT
Start: 2023-12-28 | End: 2023-12-29

## 2023-12-28 RX ORDER — SODIUM CHLORIDE 9 MG/ML
1000 INJECTION, SOLUTION INTRAVENOUS
Refills: 0 | Status: DISCONTINUED | OUTPATIENT
Start: 2023-12-28 | End: 2024-01-03

## 2023-12-28 RX ORDER — GLUCAGON INJECTION, SOLUTION 0.5 MG/.1ML
1 INJECTION, SOLUTION SUBCUTANEOUS ONCE
Refills: 0 | Status: DISCONTINUED | OUTPATIENT
Start: 2023-12-28 | End: 2024-01-03

## 2023-12-28 RX ORDER — SULFADIAZINE
500 POWDER (GRAM) MISCELLANEOUS
Refills: 0 | Status: DISCONTINUED | OUTPATIENT
Start: 2023-12-28 | End: 2023-12-28

## 2023-12-28 RX ORDER — HYDROXYCHLOROQUINE SULFATE 200 MG
200 TABLET ORAL
Refills: 0 | Status: DISCONTINUED | OUTPATIENT
Start: 2023-12-28 | End: 2024-01-03

## 2023-12-28 RX ORDER — DEXTROSE 50 % IN WATER 50 %
12.5 SYRINGE (ML) INTRAVENOUS ONCE
Refills: 0 | Status: DISCONTINUED | OUTPATIENT
Start: 2023-12-28 | End: 2024-01-03

## 2023-12-28 RX ORDER — INSULIN LISPRO 100/ML
6 VIAL (ML) SUBCUTANEOUS
Refills: 0 | Status: DISCONTINUED | OUTPATIENT
Start: 2023-12-28 | End: 2023-12-30

## 2023-12-28 RX ORDER — DEXTROSE 50 % IN WATER 50 %
25 SYRINGE (ML) INTRAVENOUS ONCE
Refills: 0 | Status: DISCONTINUED | OUTPATIENT
Start: 2023-12-28 | End: 2024-01-03

## 2023-12-28 RX ORDER — INSULIN LISPRO 100/ML
VIAL (ML) SUBCUTANEOUS
Refills: 0 | Status: DISCONTINUED | OUTPATIENT
Start: 2023-12-28 | End: 2023-12-28

## 2023-12-28 RX ADMIN — ATORVASTATIN CALCIUM 40 MILLIGRAM(S): 80 TABLET, FILM COATED ORAL at 22:07

## 2023-12-28 RX ADMIN — Medication 5 MILLIGRAM(S): at 06:22

## 2023-12-28 RX ADMIN — Medication 1000 MILLIGRAM(S): at 18:22

## 2023-12-28 RX ADMIN — Medication 6 UNIT(S): at 18:38

## 2023-12-28 RX ADMIN — ERTAPENEM SODIUM 120 MILLIGRAM(S): 1 INJECTION, POWDER, LYOPHILIZED, FOR SOLUTION INTRAMUSCULAR; INTRAVENOUS at 15:28

## 2023-12-28 RX ADMIN — Medication 2: at 22:06

## 2023-12-28 RX ADMIN — HEPARIN SODIUM 5000 UNIT(S): 5000 INJECTION INTRAVENOUS; SUBCUTANEOUS at 14:31

## 2023-12-28 RX ADMIN — MAGNESIUM OXIDE 400 MG ORAL TABLET 400 MILLIGRAM(S): 241.3 TABLET ORAL at 10:42

## 2023-12-28 RX ADMIN — AMLODIPINE BESYLATE 10 MILLIGRAM(S): 2.5 TABLET ORAL at 06:22

## 2023-12-28 RX ADMIN — Medication 10: at 13:23

## 2023-12-28 RX ADMIN — HEPARIN SODIUM 5000 UNIT(S): 5000 INJECTION INTRAVENOUS; SUBCUTANEOUS at 06:22

## 2023-12-28 RX ADMIN — HEPARIN SODIUM 5000 UNIT(S): 5000 INJECTION INTRAVENOUS; SUBCUTANEOUS at 22:08

## 2023-12-28 RX ADMIN — PREGABALIN 1000 MICROGRAM(S): 225 CAPSULE ORAL at 14:30

## 2023-12-28 RX ADMIN — Medication 81 MILLIGRAM(S): at 14:30

## 2023-12-28 RX ADMIN — Medication 200 MILLIGRAM(S): at 18:22

## 2023-12-28 RX ADMIN — INSULIN GLARGINE 15 UNIT(S): 100 INJECTION, SOLUTION SUBCUTANEOUS at 22:06

## 2023-12-28 RX ADMIN — Medication 1 MILLIGRAM(S): at 14:31

## 2023-12-28 NOTE — PROGRESS NOTE ADULT - SUBJECTIVE AND OBJECTIVE BOX
OVERNIGHT EVENTS: CYNDY     SUBJECTIVE:  Patient seen and examined at bedside. NAD   ROS: Patient denies h/n/v/d, fever, chills, cp, palpitations, sob, abd pain, leg swelling, rashes, dysuria, and changes in BM.     Vital Signs Last 12 Hrs  T(F): 98.3 (12-28-23 @ 10:48), Max: 98.3 (12-28-23 @ 10:48)  HR: 67 (12-28-23 @ 10:48) (66 - 67)  BP: 147/71 (12-28-23 @ 10:48) (147/71 - 157/80)  BP(mean): --  RR: 16 (12-28-23 @ 10:48) (16 - 18)  SpO2: 98% (12-28-23 @ 10:48) (98% - 99%)  I&O's Summary    27 Dec 2023 07:01  -  28 Dec 2023 07:00  --------------------------------------------------------  IN: 1700 mL / OUT: 1500 mL / NET: 200 mL        PHYSICAL EXAM:  Constitutional: NAD, comfortable in bed.  HEENT: NC/AT, PERRLA, EOMI, no conjunctival pallor or scleral icterus, MMM  Neck: Supple, no JVD  Respiratory: CTA B/L. No w/r/r.   Cardiovascular: RRR, normal S1 and S2, no m/r/g.   Gastrointestinal: +BS, soft NTND, no guarding or rebound tenderness, no palpable masses   Extremities: wwp; no cyanosis, clubbing or edema.   Vascular: Pulses equal and strong throughout.   Neurological: AAOx3, no CN deficits, strength and sensation intact throughout.   Skin: No gross skin abnormalities or rashes        LABS:                        11.6   6.64  )-----------( 187      ( 28 Dec 2023 05:30 )             36.1     12-28    135  |  99  |  27<H>  ----------------------------<  164<H>  4.1   |  29  |  1.23    Ca    9.1      28 Dec 2023 05:30  Phos  1.9     12-28  Mg     1.8     12-28    TPro  6.4  /  Alb  3.0<L>  /  TBili  0.3  /  DBili  x   /  AST  14  /  ALT  12  /  AlkPhos  85  12-28      Urinalysis Basic - ( 28 Dec 2023 05:30 )    Color: x / Appearance: x / SG: x / pH: x  Gluc: 164 mg/dL / Ketone: x  / Bili: x / Urobili: x   Blood: x / Protein: x / Nitrite: x   Leuk Esterase: x / RBC: x / WBC x   Sq Epi: x / Non Sq Epi: x / Bacteria: x          RADIOLOGY & ADDITIONAL TESTS:    MEDICATIONS  (STANDING):  amLODIPine   Tablet 10 milliGRAM(s) Oral daily  aspirin  chewable 81 milliGRAM(s) Oral daily  atorvastatin 40 milliGRAM(s) Oral at bedtime  cyanocobalamin 1000 MICROGram(s) Oral daily  dextrose 5%. 1000 milliLiter(s) (50 mL/Hr) IV Continuous <Continuous>  dextrose 5%. 1000 milliLiter(s) (100 mL/Hr) IV Continuous <Continuous>  dextrose 50% Injectable 25 Gram(s) IV Push once  dextrose 50% Injectable 12.5 Gram(s) IV Push once  dextrose 50% Injectable 25 Gram(s) IV Push once  ertapenem  IVPB 1000 milliGRAM(s) IV Intermittent every 24 hours  folic acid 1 milliGRAM(s) Oral daily  glucagon  Injectable 1 milliGRAM(s) IntraMuscular once  heparin   Injectable 5000 Unit(s) SubCutaneous every 8 hours  hydroxychloroquine 200 milliGRAM(s) Oral two times a day  influenza  Vaccine (HIGH DOSE) 0.7 milliLiter(s) IntraMuscular once  insulin glargine Injectable (LANTUS) 15 Unit(s) SubCutaneous at bedtime  insulin lispro (ADMELOG) corrective regimen sliding scale   SubCutaneous Before meals and at bedtime  predniSONE   Tablet 5 milliGRAM(s) Oral daily  sulfaSALAzine 1000 milliGRAM(s) Oral two times a day    MEDICATIONS  (PRN):  acetaminophen     Tablet .. 650 milliGRAM(s) Oral every 6 hours PRN Temp greater or equal to 38C (100.4F), Mild Pain (1 - 3)  dextrose Oral Gel 15 Gram(s) Oral once PRN Blood Glucose LESS THAN 70 milliGRAM(s)/deciliter

## 2023-12-28 NOTE — PROGRESS NOTE ADULT - PROBLEM SELECTOR PLAN 1
Pt needs to cancel her upcoming infusion and reschedule She will be out of town Presenting with persistent dysuria and polyuria  Presented to [facility] ED 11/16 for UTI discharged on cefpodoxime x 14 days which pt completed  Ucx 11/16 and 12/14 showed 100k  klebsiella oxytoca/raoutella ornithinolytica resistant to CTX. Was taking farxiga, prednisone 5mg   Afebrile, not septic  Reports  - ID approval for ertapenem 3 day course, f/u once sensitivties arise   - hold farxiga iso UTI Presenting with persistent dysuria and polyuria  Presented to [facility] ED 11/16 for UTI discharged on cefpodoxime x 14 days which pt completed  Ucx 11/16 and 12/14 showed 100k  klebsiella oxytoca/raoutella ornithinolytica resistant to CTX. Was taking farxiga, prednisone 5mg   Afebrile, not septic  Reports  - ID approval for ertapenem 3 day course, f/u once sensitivties arise   - hold farxiga iso UTI

## 2023-12-28 NOTE — CONSULT NOTE ADULT - SUBJECTIVE AND OBJECTIVE BOX
HISTORY OF PRESENT ILLNESS  MICHAEL READ is a 73y Female with a past medical history of NSCLC adenocarcinoma w mets to brain (s/p RT x1 07/2023, s/p chemo x2 (last tx 11/16/23),R hip replacement, RA, CKD IIIA, HTN and HLD presented to [facility] on 12/26 with persist dysuria and polyuria.  She was also seen in [facility] ED 12/14 for hyperglycemia 500s and UTI discharged cefpodoxime x 14 days.  Urine culture grew klebsiella oxytoca/raoutella ornithinolytica.  PT admitted for UTI, treated with IV abx.  Pt also reported frequent falls to which CT head was negative for hemorrhage.    On presentation, labs revealed Na 132, glucose 403, BUN 30, Cr 1.23, CO2 25, anion gap 11, , negative beta-hydroxybutyrate.  Pt found to hbe influenza +.  Endocrinology has been consulted for Diabetes Management.    Pt known to our service, pt was last seen in the hospital on 10/18/2023..  She wAS admitted with RA flare, admitted for pain controL, discharged on prednisone 5 daily.     DIABETES HISTORY  - Age at diagnosis: 15 years ago  - Current Therapy: discharged on 10/28/23 with  Lantus 15 units at bedtime, Farxiga 10mg daily and januvia 100mg daily.  - History of hypoglycemia: NA  - History of DKA/HHS:  NA  - Home glucose readings:  - Diet:          > Breakfast:         > Lunch:        > Dinner:        > Snacks:  - Physical activity:    - Diabetes managed outpatient by:    FAMILY HISTORY  - Diabetes:  - Thyroid:  - Autoimmune:  - Other:    SOCIAL HISTORY  - Work:  - Alcohol:  - Smoking:  - Recreational Drugs:    ALLERGIES  citrus (Urticaria)  Bactrim (Rash)      CURRENT MEDICATIONS  acetaminophen     Tablet .. 650 milliGRAM(s) Oral every 6 hours PRN  amLODIPine   Tablet 10 milliGRAM(s) Oral daily  aspirin  chewable 81 milliGRAM(s) Oral daily  atorvastatin 40 milliGRAM(s) Oral at bedtime  cyanocobalamin 1000 MICROGram(s) Oral daily  dextrose 5%. 1000 milliLiter(s) IV Continuous <Continuous>  dextrose 5%. 1000 milliLiter(s) IV Continuous <Continuous>  dextrose 50% Injectable 12.5 Gram(s) IV Push once  dextrose 50% Injectable 25 Gram(s) IV Push once  dextrose 50% Injectable 25 Gram(s) IV Push once  dextrose Oral Gel 15 Gram(s) Oral once PRN  ertapenem  IVPB 1000 milliGRAM(s) IV Intermittent every 24 hours  folic acid 1 milliGRAM(s) Oral daily  glucagon  Injectable 1 milliGRAM(s) IntraMuscular once  heparin   Injectable 5000 Unit(s) SubCutaneous every 8 hours  hydroxychloroquine 200 milliGRAM(s) Oral two times a day  influenza  Vaccine (HIGH DOSE) 0.7 milliLiter(s) IntraMuscular once  insulin glargine Injectable (LANTUS) 15 Unit(s) SubCutaneous at bedtime  insulin lispro (ADMELOG) corrective regimen sliding scale   SubCutaneous Before meals and at bedtime  predniSONE   Tablet 5 milliGRAM(s) Oral daily  sulfaSALAzine 1000 milliGRAM(s) Oral two times a day      REVIEW OF SYSTEMS  Constitutional:  Negative fever, chills or loss of appetite.  Eyes:  Negative blurry vision or double vision.  Cardiovascular:  Negative for chest pain or palpitations.  Respiratory:  Negative for cough, wheezing, or shortness of breath.   Gastrointestinal:  Negative for nausea, vomiting, diarrhea, constipation, or abdominal pain.  Genitourinary:  Negative frequency, urgency or dysuria.  Neurologic:  No headache, confusion, dizziness, lightheadedness.    PHYSICAL EXAM  Vital Signs Last 24 Hrs  T(C): 36.8 (28 Dec 2023 10:48), Max: 36.8 (27 Dec 2023 20:59)  T(F): 98.3 (28 Dec 2023 10:48), Max: 98.3 (28 Dec 2023 10:48)  HR: 67 (28 Dec 2023 10:48) (66 - 78)  BP: 147/71 (28 Dec 2023 10:48) (144/69 - 157/80)  BP(mean): --  RR: 16 (28 Dec 2023 10:48) (16 - 18)  SpO2: 98% (28 Dec 2023 10:48) (96% - 99%)    Parameters below as of 28 Dec 2023 10:48  Patient On (Oxygen Delivery Method): room air    Constitutional: Awake, alert, in no acute distress.   HEENT: Normocephalic, atraumatic, NEDRA, no proptosis or lid retraction.   Neck: supple, no acanthosis, no thyromegaly or palpable thyroid nodules.  Respiratory: Lungs clear to ausculation bilaterally.   Cardiovascular: regular rhythm, normal S1 and S2, no audible murmurs.   GI: soft, non-tender, non-distended, bowel sounds present, no masses appreciated.  Extremities: No lower extremity edema, peripheral pulses present.   Skin: no rashes.   Psychiatric: AAO x 3. Normal affect/mood.     LABS  CBC - WBC/HGB/HTC/PLT: 6.64/11.6/36.1/187 (12-28-23)  BMP: Na/K/Cl/Bicarb/BUN/Cr/Gluc: 135/4.1/99/29/27/1.23/164 (12-28-23)  Anion Gap: 7 (12-28-23)  eGFR: 46 (12-28-23)  Calcium: 9.1 (12-28-23)  Phosphorus: 1.9 (12-28-23)  Magnesium: 1.8 (12-28-23)  LFT - Alb/Tprot/Tbili/Dbili/AlkPhos/ALT/AST: 3.0/--/0.3/--/85/12/14 (12-28-23)    Thyroid Stimulating Hormone, Serum: 0.473 (11-16-23)    CBC - WBC/HGB/HTC/PLT: 6.64/11.6/36.1/187 (12-28-23)BMP: Na/K/Cl/Bicarb/BUN/Cr/Gluc: 135/4.1/99/29/27/1.23/164 (12-28-23)  Anion Gap: 7 (12-28-23)  eGFR: 46 (12-28-23)  Calcium: 9.1 (12-28-23)  Phosphorus: 1.9 (12-28-23)  Magnesium: 1.8 (12-28-23)    CAPILLARY BLOOD GLUCOSE & INSULIN RECEIVED  156 mg/dL (12-27 @ 02:18)  109 mg/dL (12-27 @ 06:32)  227 mg/dL (12-27 @ 13:10)  265 mg/dL (12-27 @ 18:03) 6  244 mg/dL (12-27 @ 21:55) 15  186 mg/dL (12-28 @ 09:19)        12-27-23 @ 07:01  -  12-28-23 @ 07:00  --------------------------------------------------------  IN: 1700 mL / OUT: 1500 mL / NET: 200 mL        ASSESSMENT / RECOMMENDATIONS    MICHAEL READ is a 73y Female with a past medical history of NSCLC adenocarcinoma w mets to brain (s/p RT x1 07/2023, s/p chemo x2 (last tx 11/16/23),R hip replacement, RA, CKD IIIA, HTN and HLD presented to [facility] on 12/26 with persist dysuria and polyuria.  She was also seen in [facility] ED 12/14 for hyperglycemia 500s and UTI discharged cefpodoxime x 14 days.  Urine culture grew klebsiella oxytoca/raoutella ornithinolytica.  PT admitted for UTI, treated with IV abx.  Pt also reported frequent falls to which CT head was negative for hemorrhage.    On presentation, labs revealed Na 132, glucose 403, BUN 30, Cr 1.23, CO2 25, anion gap 11, , negative beta-hydroxybutyrate.  Pt found to hbe influenza +.  Endocrinology has been consulted for Diabetes Management.    A1C: 11.5 %  BUN: 27  Creatinine: 1.23  GFR: 46  Weight: 62.1  BMI: 24.3     # Type 2 diabetes mellitus with hyperglycemia  - pt will remain on prednisone 5  - Please keep lantus   units at bedtime.   - Keep lispro   units before each meal.  - Continue lispro moderate dose sliding scale before meals and at bedtime.  - Patient's fingerstick glucose goal is 100-180 mg/dL.    - Discharge recommendations to be discussed.   - Patient can follow up at discharge with [facility] by calling [phone] to make an appointment.      Case discussed with Dr. Blackwood. Primary team updated.       Alix Harmon  Endocrinology Fellow    Service Pager: [phone]  HISTORY OF PRESENT ILLNESS  MICHAEL READ is a 73y Female with a past medical history of NSCLC adenocarcinoma w mets to brain (s/p RT x1 07/2023, s/p chemo x2 (last tx 11/16/23),R hip replacement, RA, CKD IIIA, HTN and HLD presented to [facility] on 12/26 with persist dysuria and polyuria.  She was also seen in [facility] ED 12/14 for hyperglycemia 500s and UTI discharged cefpodoxime x 14 days.  Urine culture grew klebsiella oxytoca/raoutella ornithinolytica.  PT admitted for UTI, treated with IV abx.  Pt also reported frequent falls to which CT head was negative for hemorrhage.    On presentation, labs revealed Na 132, glucose 403, BUN 30, Cr 1.23, CO2 25, anion gap 11, , negative beta-hydroxybutyrate.  Pt found to hbe influenza +.  Endocrinology has been consulted for Diabetes Management.    Pt known to our service, pt was last seen in the hospital on 10/18/2023..  She wAS admitted with RA flare, admitted for pain controL, discharged on prednisone 5 daily.     DIABETES HISTORY  - Age at diagnosis: 15 years ago  - Current Therapy: discharged on 10/28/23 with  Lantus 15 units at bedtime, Farxiga 10mg daily and januvia 100mg daily.  - History of hypoglycemia: NA  - History of DKA/HHS:  NA  - Home glucose readings:  - Diet:          > Breakfast:         > Lunch:        > Dinner:        > Snacks:  - Physical activity:    - Diabetes managed outpatient by:    FAMILY HISTORY  - Diabetes:  - Thyroid:  - Autoimmune:  - Other:    SOCIAL HISTORY  - Work:  - Alcohol:  - Smoking:  - Recreational Drugs:    ALLERGIES  citrus (Urticaria)  Bactrim (Rash)      CURRENT MEDICATIONS  acetaminophen     Tablet .. 650 milliGRAM(s) Oral every 6 hours PRN  amLODIPine   Tablet 10 milliGRAM(s) Oral daily  aspirin  chewable 81 milliGRAM(s) Oral daily  atorvastatin 40 milliGRAM(s) Oral at bedtime  cyanocobalamin 1000 MICROGram(s) Oral daily  dextrose 5%. 1000 milliLiter(s) IV Continuous <Continuous>  dextrose 5%. 1000 milliLiter(s) IV Continuous <Continuous>  dextrose 50% Injectable 12.5 Gram(s) IV Push once  dextrose 50% Injectable 25 Gram(s) IV Push once  dextrose 50% Injectable 25 Gram(s) IV Push once  dextrose Oral Gel 15 Gram(s) Oral once PRN  ertapenem  IVPB 1000 milliGRAM(s) IV Intermittent every 24 hours  folic acid 1 milliGRAM(s) Oral daily  glucagon  Injectable 1 milliGRAM(s) IntraMuscular once  heparin   Injectable 5000 Unit(s) SubCutaneous every 8 hours  hydroxychloroquine 200 milliGRAM(s) Oral two times a day  influenza  Vaccine (HIGH DOSE) 0.7 milliLiter(s) IntraMuscular once  insulin glargine Injectable (LANTUS) 15 Unit(s) SubCutaneous at bedtime  insulin lispro (ADMELOG) corrective regimen sliding scale   SubCutaneous Before meals and at bedtime  predniSONE   Tablet 5 milliGRAM(s) Oral daily  sulfaSALAzine 1000 milliGRAM(s) Oral two times a day      REVIEW OF SYSTEMS  Constitutional:  Negative fever, chills or loss of appetite.  Eyes:  Negative blurry vision or double vision.  Cardiovascular:  Negative for chest pain or palpitations.  Respiratory:  Negative for cough, wheezing, or shortness of breath.   Gastrointestinal:  Negative for nausea, vomiting, diarrhea, constipation, or abdominal pain.  Genitourinary:  Negative frequency, urgency or dysuria.  Neurologic:  No headache, confusion, dizziness, lightheadedness.    PHYSICAL EXAM  Vital Signs Last 24 Hrs  T(C): 36.8 (28 Dec 2023 10:48), Max: 36.8 (27 Dec 2023 20:59)  T(F): 98.3 (28 Dec 2023 10:48), Max: 98.3 (28 Dec 2023 10:48)  HR: 67 (28 Dec 2023 10:48) (66 - 78)  BP: 147/71 (28 Dec 2023 10:48) (144/69 - 157/80)  BP(mean): --  RR: 16 (28 Dec 2023 10:48) (16 - 18)  SpO2: 98% (28 Dec 2023 10:48) (96% - 99%)    Parameters below as of 28 Dec 2023 10:48  Patient On (Oxygen Delivery Method): room air    Constitutional: Awake, alert, in no acute distress.   HEENT: Normocephalic, atraumatic, NEDRA, no proptosis or lid retraction.   Neck: supple, no acanthosis, no thyromegaly or palpable thyroid nodules.  Respiratory: Lungs clear to ausculation bilaterally.   Cardiovascular: regular rhythm, normal S1 and S2, no audible murmurs.   GI: soft, non-tender, non-distended, bowel sounds present, no masses appreciated.  Extremities: No lower extremity edema, peripheral pulses present.   Skin: no rashes.   Psychiatric: AAO x 3. Normal affect/mood.     LABS  CBC - WBC/HGB/HTC/PLT: 6.64/11.6/36.1/187 (12-28-23)  BMP: Na/K/Cl/Bicarb/BUN/Cr/Gluc: 135/4.1/99/29/27/1.23/164 (12-28-23)  Anion Gap: 7 (12-28-23)  eGFR: 46 (12-28-23)  Calcium: 9.1 (12-28-23)  Phosphorus: 1.9 (12-28-23)  Magnesium: 1.8 (12-28-23)  LFT - Alb/Tprot/Tbili/Dbili/AlkPhos/ALT/AST: 3.0/--/0.3/--/85/12/14 (12-28-23)    Thyroid Stimulating Hormone, Serum: 0.473 (11-16-23)    CBC - WBC/HGB/HTC/PLT: 6.64/11.6/36.1/187 (12-28-23)BMP: Na/K/Cl/Bicarb/BUN/Cr/Gluc: 135/4.1/99/29/27/1.23/164 (12-28-23)  Anion Gap: 7 (12-28-23)  eGFR: 46 (12-28-23)  Calcium: 9.1 (12-28-23)  Phosphorus: 1.9 (12-28-23)  Magnesium: 1.8 (12-28-23)    CAPILLARY BLOOD GLUCOSE & INSULIN RECEIVED  156 mg/dL (12-27 @ 02:18)  109 mg/dL (12-27 @ 06:32)  227 mg/dL (12-27 @ 13:10)  265 mg/dL (12-27 @ 18:03) 6  244 mg/dL (12-27 @ 21:55) 15  186 mg/dL (12-28 @ 09:19)        12-27-23 @ 07:01  -  12-28-23 @ 07:00  --------------------------------------------------------  IN: 1700 mL / OUT: 1500 mL / NET: 200 mL        ASSESSMENT / RECOMMENDATIONS    MICHAEL READ is a 73y Female with a past medical history of NSCLC adenocarcinoma w mets to brain (s/p RT x1 07/2023, s/p chemo x2 (last tx 11/16/23),R hip replacement, RA, CKD IIIA, HTN and HLD presented to [facility] on 12/26 with persist dysuria and polyuria.  She was also seen in [facility] ED 12/14 for hyperglycemia 500s and UTI discharged cefpodoxime x 14 days.  Urine culture grew klebsiella oxytoca/raoutella ornithinolytica.  PT admitted for UTI, treated with IV abx.  Pt also reported frequent falls to which CT head was negative for hemorrhage.    On presentation, labs revealed Na 132, glucose 403, BUN 30, Cr 1.23, CO2 25, anion gap 11, , negative beta-hydroxybutyrate.  Pt found to hbe influenza +.  Endocrinology has been consulted for Diabetes Management.    A1C: 11.5 %  BUN: 27  Creatinine: 1.23  GFR: 46  Weight: 62.1  BMI: 24.3     # Type 2 diabetes mellitus with hyperglycemia  - pt will remain on prednisone 5  - Please keep lantus   units at bedtime.   - Keep lispro   units before each meal.  - Continue lispro moderate dose sliding scale before meals and at bedtime.  - Patient's fingerstick glucose goal is 100-180 mg/dL.    - Discharge recommendations to be discussed.   - Patient can follow up at discharge with [facility] by calling [phone] to make an appointment.      Case discussed with Dr. Blackwood. Primary team updated.       Alix Harmon  Endocrinology Fellow    Service Pager: [phone]  HISTORY OF PRESENT ILLNESS  MICHAEL READ is a 73y Female with a past medical history of NSCLC adenocarcinoma w mets to brain (s/p RT x1 07/2023, s/p chemo x2 (last tx 11/16/23),R hip replacement, RA, CKD IIIA, HTN and HLD presented to [facility] on 12/26 with persist dysuria and polyuria.  She was also seen in [facility] ED 12/14 for hyperglycemia 500s and UTI discharged cefpodoxime x 14 days.  Urine culture grew klebsiella oxytoca/raoutella ornithinolytica.  PT admitted for UTI, treated with IV abx.  Pt also reported frequent falls to which CT head was negative for hemorrhage.    On presentation, labs revealed Na 132, glucose 403, BUN 30, Cr 1.23, CO2 25, anion gap 11, , negative beta-hydroxybutyrate.  Pt found to hbe influenza +.  Endocrinology has been consulted for Diabetes Management.    Pt known to our service, pt was last seen in the hospital on 10/18/2023..  She wAS admitted with RA flare, admitted for pain controL, discharged on prednisone 5 daily. a1c was 10.0 at that time.  Pt was managed on basal/bolus (15 lantus + 6 lispro) during hospital course.    DIABETES HISTORY  - Age at diagnosis: 15 years ago  - Current Therapy: discharged on 10/28/23 with  Lantus 15 units at bedtime, Farxiga 10mg daily and januvia 100mg daily.  Pt states she was taking lantus 17 units nightly  - History of hypoglycemia: NA  - History of DKA/HHS:  NA  - Home glucose readings: pt states before the episode of UTI, her sugars were 80-120s in the morning, since about 2 weeks ago the glucose have been running >300  - Diet:          > Breakfast:  tumeric tea, plain toast, orange        > Lunch/dinner: meat or fish + vegetables        > Snacks:  yogurt, kaffir with maple syrup, chocolate sometimes, likes bananas, oranges, grapes, drinks water most of the times, sometimes regular pepsi  - Physical activity: walks with walker    - Diabetes managed outpatient by:  PCP    FAMILY HISTORY  - Diabetes: aunt    ALLERGIES  citrus (Urticaria)  Bactrim (Rash)      CURRENT MEDICATIONS  acetaminophen     Tablet .. 650 milliGRAM(s) Oral every 6 hours PRN  amLODIPine   Tablet 10 milliGRAM(s) Oral daily  aspirin  chewable 81 milliGRAM(s) Oral daily  atorvastatin 40 milliGRAM(s) Oral at bedtime  cyanocobalamin 1000 MICROGram(s) Oral daily  dextrose 5%. 1000 milliLiter(s) IV Continuous <Continuous>  dextrose 5%. 1000 milliLiter(s) IV Continuous <Continuous>  dextrose 50% Injectable 12.5 Gram(s) IV Push once  dextrose 50% Injectable 25 Gram(s) IV Push once  dextrose 50% Injectable 25 Gram(s) IV Push once  dextrose Oral Gel 15 Gram(s) Oral once PRN  ertapenem  IVPB 1000 milliGRAM(s) IV Intermittent every 24 hours  folic acid 1 milliGRAM(s) Oral daily  glucagon  Injectable 1 milliGRAM(s) IntraMuscular once  heparin   Injectable 5000 Unit(s) SubCutaneous every 8 hours  hydroxychloroquine 200 milliGRAM(s) Oral two times a day  influenza  Vaccine (HIGH DOSE) 0.7 milliLiter(s) IntraMuscular once  insulin glargine Injectable (LANTUS) 15 Unit(s) SubCutaneous at bedtime  insulin lispro (ADMELOG) corrective regimen sliding scale   SubCutaneous Before meals and at bedtime  predniSONE   Tablet 5 milliGRAM(s) Oral daily  sulfaSALAzine 1000 milliGRAM(s) Oral two times a day      REVIEW OF SYSTEMS  Constitutional:  Negative fever, chills or loss of appetite.  Eyes:  Negative blurry vision or double vision.  Cardiovascular:  Negative for chest pain or palpitations.  Respiratory:  Negative for cough, wheezing, or shortness of breath.   Gastrointestinal:  Negative for nausea, vomiting, diarrhea, constipation, or abdominal pain.  Genitourinary:  Negative frequency, urgency or dysuria.  Neurologic:  No headache, confusion, dizziness, lightheadedness.    PHYSICAL EXAM  Vital Signs Last 24 Hrs  T(C): 36.8 (28 Dec 2023 10:48), Max: 36.8 (27 Dec 2023 20:59)  T(F): 98.3 (28 Dec 2023 10:48), Max: 98.3 (28 Dec 2023 10:48)  HR: 67 (28 Dec 2023 10:48) (66 - 78)  BP: 147/71 (28 Dec 2023 10:48) (144/69 - 157/80)  BP(mean): --  RR: 16 (28 Dec 2023 10:48) (16 - 18)  SpO2: 98% (28 Dec 2023 10:48) (96% - 99%)    Parameters below as of 28 Dec 2023 10:48  Patient On (Oxygen Delivery Method): room air    Constitutional: Awake, alert, in no acute distress.   HEENT: Normocephalic, atraumatic, NEDRA, no proptosis or lid retraction.   Neck: supple, no acanthosis, no thyromegaly or palpable thyroid nodules.  Respiratory: Lungs clear to ausculation bilaterally.   Cardiovascular: regular rhythm, normal S1 and S2, no audible murmurs.   GI: soft, non-tender, non-distended, bowel sounds present, no masses appreciated.  Extremities: No lower extremity edema, peripheral pulses present.   Skin: no rashes.   Psychiatric: AAO x 3. Normal affect/mood.     LABS  CBC - WBC/HGB/HTC/PLT: 6.64/11.6/36.1/187 (12-28-23)  BMP: Na/K/Cl/Bicarb/BUN/Cr/Gluc: 135/4.1/99/29/27/1.23/164 (12-28-23)  Anion Gap: 7 (12-28-23)  eGFR: 46 (12-28-23)  Calcium: 9.1 (12-28-23)  Phosphorus: 1.9 (12-28-23)  Magnesium: 1.8 (12-28-23)  LFT - Alb/Tprot/Tbili/Dbili/AlkPhos/ALT/AST: 3.0/--/0.3/--/85/12/14 (12-28-23)    Thyroid Stimulating Hormone, Serum: 0.473 (11-16-23)    CBC - WBC/HGB/HTC/PLT: 6.64/11.6/36.1/187 (12-28-23)BMP: Na/K/Cl/Bicarb/BUN/Cr/Gluc: 135/4.1/99/29/27/1.23/164 (12-28-23)  Anion Gap: 7 (12-28-23)  eGFR: 46 (12-28-23)  Calcium: 9.1 (12-28-23)  Phosphorus: 1.9 (12-28-23)  Magnesium: 1.8 (12-28-23)    CAPILLARY BLOOD GLUCOSE & INSULIN RECEIVED  156 mg/dL (12-27 @ 02:18) lantus 15  109 mg/dL (12-27 @ 06:32)  227 mg/dL (12-27 @ 13:10)  265 mg/dL (12-27 @ 18:03) 6 - salmon + mashed potatoes  244 mg/dL (12-27 @ 21:55) 15  186 mg/dL (12-28 @ 09:19)   386 - 10 units        12-27-23 @ 07:01  -  12-28-23 @ 07:00  --------------------------------------------------------  IN: 1700 mL / OUT: 1500 mL / NET: 200 mL        ASSESSMENT / RECOMMENDATIONS    MICHAEL READ is a 73y Female with a past medical history of NSCLC adenocarcinoma w mets to brain (s/p RT x1 07/2023, s/p chemo x2 (last tx 11/16/23),R hip replacement, RA, CKD IIIA, HTN and HLD presented to [facility] on 12/26 with persist dysuria and polyuria.  She was also seen in [facility] ED 12/14 for hyperglycemia 500s and UTI discharged cefpodoxime x 14 days.  Urine culture grew klebsiella oxytoca/raoutella ornithinolytica.  PT admitted for UTI, treated with IV abx.  Pt also reported frequent falls to which CT head was negative for hemorrhage.    On presentation, labs revealed Na 132, glucose 403, BUN 30, Cr 1.23, CO2 25, anion gap 11, , negative beta-hydroxybutyrate.  Pt found to hbe influenza +.  Endocrinology has been consulted for Diabetes Management.    A1C: 11.5 %  BUN: 27  Creatinine: 1.23  GFR: 46  Weight: 62.1  BMI: 24.3     # Type 2 diabetes mellitus with hyperglycemia  - pt will remain on prednisone 5  - Please keep lantus 15  units at bedtime.   - Keep lispro 6  units before each meal.  - Continue lispro moderate dose sliding scale before meals and at bedtime.  - Patient's fingerstick glucose goal is 100-180 mg/dL.    - Discharge recommendations to be discussed.   - Patient can follow up at discharge with [facility] by calling [phone] to make an appointment.      Case discussed with Dr. Blackwood. Primary team updated.       Alix Harmon  Endocrinology Fellow    Service Pager: [phone]  HISTORY OF PRESENT ILLNESS  MICHAEL READ is a 73y Female with a past medical history of NSCLC adenocarcinoma w mets to brain (s/p RT x1 07/2023, s/p chemo x2 (last tx 11/16/23),R hip replacement, RA, CKD IIIA, HTN and HLD presented to [facility] on 12/26 with persist dysuria and polyuria.  She was also seen in [facility] ED 12/14 for hyperglycemia 500s and UTI discharged cefpodoxime x 14 days.  Urine culture grew klebsiella oxytoca/raoutella ornithinolytica.  PT admitted for UTI, treated with IV abx.  Pt also reported frequent falls to which CT head was negative for hemorrhage.    On presentation, labs revealed Na 132, glucose 403, BUN 30, Cr 1.23, CO2 25, anion gap 11, , negative beta-hydroxybutyrate.  Pt found to hbe influenza +.  Endocrinology has been consulted for Diabetes Management.    Pt known to our service, pt was last seen in the hospital on 10/18/2023..  She wAS admitted with RA flare, admitted for pain controL, discharged on prednisone 5 daily. a1c was 10.0 at that time.  Pt was managed on basal/bolus (15 lantus + 6 lispro) during hospital course.    DIABETES HISTORY  - Age at diagnosis: 15 years ago  - Current Therapy: discharged on 10/28/23 with  Lantus 15 units at bedtime, Farxiga 10mg daily and januvia 100mg daily.  Pt states she was taking lantus 17 units nightly  - History of hypoglycemia: NA  - History of DKA/HHS:  NA  - Home glucose readings: pt states before the episode of UTI, her sugars were 80-120s in the morning, since about 2 weeks ago the glucose have been running >300  - Diet:          > Breakfast:  tumeric tea, plain toast, orange        > Lunch/dinner: meat or fish + vegetables        > Snacks:  yogurt, kaffir with maple syrup, chocolate sometimes, likes bananas, oranges, grapes, drinks water most of the times, sometimes regular pepsi  - Physical activity: walks with walker    - Diabetes managed outpatient by:  PCP    FAMILY HISTORY  - Diabetes: aunt    ALLERGIES  citrus (Urticaria)  Bactrim (Rash)      CURRENT MEDICATIONS  acetaminophen     Tablet .. 650 milliGRAM(s) Oral every 6 hours PRN  amLODIPine   Tablet 10 milliGRAM(s) Oral daily  aspirin  chewable 81 milliGRAM(s) Oral daily  atorvastatin 40 milliGRAM(s) Oral at bedtime  cyanocobalamin 1000 MICROGram(s) Oral daily  dextrose 5%. 1000 milliLiter(s) IV Continuous <Continuous>  dextrose 5%. 1000 milliLiter(s) IV Continuous <Continuous>  dextrose 50% Injectable 12.5 Gram(s) IV Push once  dextrose 50% Injectable 25 Gram(s) IV Push once  dextrose 50% Injectable 25 Gram(s) IV Push once  dextrose Oral Gel 15 Gram(s) Oral once PRN  ertapenem  IVPB 1000 milliGRAM(s) IV Intermittent every 24 hours  folic acid 1 milliGRAM(s) Oral daily  glucagon  Injectable 1 milliGRAM(s) IntraMuscular once  heparin   Injectable 5000 Unit(s) SubCutaneous every 8 hours  hydroxychloroquine 200 milliGRAM(s) Oral two times a day  influenza  Vaccine (HIGH DOSE) 0.7 milliLiter(s) IntraMuscular once  insulin glargine Injectable (LANTUS) 15 Unit(s) SubCutaneous at bedtime  insulin lispro (ADMELOG) corrective regimen sliding scale   SubCutaneous Before meals and at bedtime  predniSONE   Tablet 5 milliGRAM(s) Oral daily  sulfaSALAzine 1000 milliGRAM(s) Oral two times a day      REVIEW OF SYSTEMS  Constitutional:  Negative fever, chills or loss of appetite.  Eyes:  Negative blurry vision or double vision.  Cardiovascular:  Negative for chest pain or palpitations.  Respiratory:  Negative for cough, wheezing, or shortness of breath.   Gastrointestinal:  Negative for nausea, vomiting, diarrhea, constipation, or abdominal pain.  Genitourinary:  Negative frequency, urgency or dysuria.  Neurologic:  No headache, confusion, dizziness, lightheadedness.    PHYSICAL EXAM  Vital Signs Last 24 Hrs  T(C): 36.8 (28 Dec 2023 10:48), Max: 36.8 (27 Dec 2023 20:59)  T(F): 98.3 (28 Dec 2023 10:48), Max: 98.3 (28 Dec 2023 10:48)  HR: 67 (28 Dec 2023 10:48) (66 - 78)  BP: 147/71 (28 Dec 2023 10:48) (144/69 - 157/80)  BP(mean): --  RR: 16 (28 Dec 2023 10:48) (16 - 18)  SpO2: 98% (28 Dec 2023 10:48) (96% - 99%)    Parameters below as of 28 Dec 2023 10:48  Patient On (Oxygen Delivery Method): room air    Constitutional: Awake, alert, in no acute distress.   HEENT: Normocephalic, atraumatic, NEDRA, no proptosis or lid retraction.   Neck: supple, no acanthosis, no thyromegaly or palpable thyroid nodules.  Respiratory: Lungs clear to ausculation bilaterally.   Cardiovascular: regular rhythm, normal S1 and S2, no audible murmurs.   GI: soft, non-tender, non-distended, bowel sounds present, no masses appreciated.  Extremities: No lower extremity edema, peripheral pulses present.   Skin: no rashes.   Psychiatric: AAO x 3. Normal affect/mood.     LABS  CBC - WBC/HGB/HTC/PLT: 6.64/11.6/36.1/187 (12-28-23)  BMP: Na/K/Cl/Bicarb/BUN/Cr/Gluc: 135/4.1/99/29/27/1.23/164 (12-28-23)  Anion Gap: 7 (12-28-23)  eGFR: 46 (12-28-23)  Calcium: 9.1 (12-28-23)  Phosphorus: 1.9 (12-28-23)  Magnesium: 1.8 (12-28-23)  LFT - Alb/Tprot/Tbili/Dbili/AlkPhos/ALT/AST: 3.0/--/0.3/--/85/12/14 (12-28-23)    Thyroid Stimulating Hormone, Serum: 0.473 (11-16-23)    CBC - WBC/HGB/HTC/PLT: 6.64/11.6/36.1/187 (12-28-23)BMP: Na/K/Cl/Bicarb/BUN/Cr/Gluc: 135/4.1/99/29/27/1.23/164 (12-28-23)  Anion Gap: 7 (12-28-23)  eGFR: 46 (12-28-23)  Calcium: 9.1 (12-28-23)  Phosphorus: 1.9 (12-28-23)  Magnesium: 1.8 (12-28-23)    CAPILLARY BLOOD GLUCOSE & INSULIN RECEIVED  156 mg/dL (12-27 @ 02:18) lantus 15  109 mg/dL (12-27 @ 06:32)  227 mg/dL (12-27 @ 13:10)  265 mg/dL (12-27 @ 18:03) 6 - salmon + mashed potatoes  244 mg/dL (12-27 @ 21:55) 15  186 mg/dL (12-28 @ 09:19)   386 - 10 units        12-27-23 @ 07:01  -  12-28-23 @ 07:00  --------------------------------------------------------  IN: 1700 mL / OUT: 1500 mL / NET: 200 mL        ASSESSMENT / RECOMMENDATIONS    MICHAEL READ is a 73y Female with a past medical history of NSCLC adenocarcinoma w mets to brain (s/p RT x1 07/2023, s/p chemo x2 (last tx 11/16/23),R hip replacement, RA, CKD IIIA, HTN and HLD presented to [facility] on 12/26 with persist dysuria and polyuria.  She was also seen in [facility] ED 12/14 for hyperglycemia 500s and UTI discharged cefpodoxime x 14 days.  Urine culture grew klebsiella oxytoca/raoutella ornithinolytica.  PT admitted for UTI, treated with IV abx.  Pt also reported frequent falls to which CT head was negative for hemorrhage.    On presentation, labs revealed Na 132, glucose 403, BUN 30, Cr 1.23, CO2 25, anion gap 11, , negative beta-hydroxybutyrate.  Pt found to hbe influenza +.  Endocrinology has been consulted for Diabetes Management.    A1C: 11.5 %  BUN: 27  Creatinine: 1.23  GFR: 46  Weight: 62.1  BMI: 24.3     # Type 2 diabetes mellitus with hyperglycemia  - pt will remain on prednisone 5  - Please keep lantus 15  units at bedtime.   - Keep lispro 6  units before each meal.  - Continue lispro moderate dose sliding scale before meals and at bedtime.  - Patient's fingerstick glucose goal is 100-180 mg/dL.    - Discharge recommendations to be discussed.   - Patient can follow up at discharge with [facility] by calling [phone] to make an appointment.      Case discussed with Dr. Blackwood. Primary team updated.       Alix Harmon  Endocrinology Fellow    Service Pager: [phone]

## 2023-12-28 NOTE — PROGRESS NOTE ADULT - SUBJECTIVE AND OBJECTIVE BOX
INTERVAL HPI/OVERNIGHT EVENTS:  Interim reviewed;  No complaint this morning   Antibiotics noted;        MEDICATIONS  (STANDING):  amLODIPine   Tablet 10 milliGRAM(s) Oral daily  aspirin  chewable 81 milliGRAM(s) Oral daily  atorvastatin 40 milliGRAM(s) Oral at bedtime  cyanocobalamin 1000 MICROGram(s) Oral daily  dextrose 5%. 1000 milliLiter(s) (50 mL/Hr) IV Continuous <Continuous>  dextrose 5%. 1000 milliLiter(s) (100 mL/Hr) IV Continuous <Continuous>  dextrose 50% Injectable 12.5 Gram(s) IV Push once  dextrose 50% Injectable 25 Gram(s) IV Push once  dextrose 50% Injectable 25 Gram(s) IV Push once  ertapenem  IVPB 1000 milliGRAM(s) IV Intermittent every 24 hours  folic acid 1 milliGRAM(s) Oral daily  glucagon  Injectable 1 milliGRAM(s) IntraMuscular once  heparin   Injectable 5000 Unit(s) SubCutaneous every 8 hours  influenza  Vaccine (HIGH DOSE) 0.7 milliLiter(s) IntraMuscular once  insulin glargine Injectable (LANTUS) 15 Unit(s) SubCutaneous at bedtime  insulin lispro (ADMELOG) corrective regimen sliding scale   SubCutaneous Before meals and at bedtime  predniSONE   Tablet 5 milliGRAM(s) Oral daily    MEDICATIONS  (PRN):  acetaminophen     Tablet .. 650 milliGRAM(s) Oral every 6 hours PRN Temp greater or equal to 38C (100.4F), Mild Pain (1 - 3)  dextrose Oral Gel 15 Gram(s) Oral once PRN Blood Glucose LESS THAN 70 milliGRAM(s)/deciliter      Allergies    citrus (Urticaria)  Bactrim (Rash)    Intolerances        Vital Signs Last 24 Hrs  T(C): 36.8 (28 Dec 2023 05:51), Max: 36.8 (27 Dec 2023 20:59)  T(F): 98.2 (28 Dec 2023 05:51), Max: 98.2 (27 Dec 2023 20:59)  HR: 66 (28 Dec 2023 05:51) (55 - 78)  BP: 157/80 (28 Dec 2023 05:51) (144/69 - 168/78)  BP(mean): --  RR: 18 (28 Dec 2023 05:51) (18 - 18)  SpO2: 99% (28 Dec 2023 05:51) (96% - 99%)    Parameters below as of 28 Dec 2023 05:51  Patient On (Oxygen Delivery Method): room air              Constitutional:  Awake     Eyes: NEDRA    ENMT: Negative    Neck: Supple    Back:  no tenderness     Respiratory:  clear    Cardiovascular: S1 S2    Gastrointestinal: soft     Genitourinary:    Extremities:  no edema     Vascular:    Neurological: Awake and appears alert    Skin:    Lymph Nodes:            12-27 @ 07:01  -  12-28 @ 07:00  --------------------------------------------------------  IN: 1700 mL / OUT: 1500 mL / NET: 200 mL      LABS:                        11.6   10.13 )-----------( 184      ( 26 Dec 2023 20:34 )             36.4     12-27    136  |  99  |  22  ----------------------------<  129<H>  3.2<L>   |  29  |  1.09    Ca    9.9      27 Dec 2023 05:30  Phos  2.1     12-27  Mg     2.7     12-27    TPro  7.5  /  Alb  3.9  /  TBili  0.4  /  DBili  x   /  AST  16  /  ALT  16  /  AlkPhos  155<H>  12-26      Urinalysis Basic - ( 27 Dec 2023 05:30 )    Color: x / Appearance: x / SG: x / pH: x  Gluc: 129 mg/dL / Ketone: x  / Bili: x / Urobili: x   Blood: x / Protein: x / Nitrite: x   Leuk Esterase: x / RBC: x / WBC x   Sq Epi: x / Non Sq Epi: x / Bacteria: x        RADIOLOGY & ADDITIONAL TESTS:

## 2023-12-28 NOTE — CONSULT NOTE ADULT - ASSESSMENT
74 yo F with NSCLC with BMs and liver met (STK11 mutant, PIK3CA mutant, TP53 mutant, PD-L1 negative) admitted to [facility] w/ ctx resistant UTI, was found to be positive for influenza A   ?  # NSCLC w/ brain metastases  s/p SRS to the brain   [place] [facility] [place] regimen was favored over carbo/pemetrexed/pembrolizumab due to CKD and baseline GFR less than 45.  C2 carbo/Taxol/pembro on 10/26/23 (dose reduced Taxol by 25%, carboplatin calculated dose for Cr of 1.3)    # T2DM   - please ensure appropriate glycemic control - unable to provide treatment with severe hyperglycemia  - endocrine following     Please make an appointment with Dr Sedrick Delaney @ [facility] [place] ([phone])  within one week of discharge  72 yo F with NSCLC with BMs and liver met (STK11 mutant, PIK3CA mutant, TP53 mutant, PD-L1 negative) admitted to [facility] w/ ctx resistant UTI, was found to be positive for influenza A   ?  # NSCLC w/ brain metastases  s/p SRS to the brain   Received C1 paclitaxel/pembrolizumab on 9/7/2023 at [facility] [place] This regimen was favored over carbo/pemetrexed/pembrolizumab due to CKD and baseline GFR less than 45.  C2 carbo/Taxol/pembro on 10/26/23 (dose reduced Taxol by 25%, carboplatin calculated dose for Cr of 1.3)    # T2DM   - please ensure appropriate glycemic control - unable to provide treatment with severe hyperglycemia  - endocrine following     Please make an appointment with Dr Sedrick Delaney @ [facility] [place] ([phone])  within one week of discharge  72 yo F with NSCLC with BMs and liver met (STK11 mutant, PIK3CA mutant, TP53 mutant, PD-L1 negative) admitted to [facility] w/ ctx resistant UTI, was found to be positive for influenza A   ?  # NSCLC w/ brain metastases  s/p SRS to the brain   Received C1 paclitaxel/pembrolizumab on 9/7/2023 at [facility] [place]. This regimen was favored over carbo/pemetrexed/pembrolizumab due to CKD and baseline GFR less than 45.  C2 carbo/Taxol/pembro on 10/26/23 (dose reduced Taxol by 25%, carboplatin calculated dose for Cr of 1.3)    # T2DM   - Planned C3 11/26 held 2/2 severe hyperglycemia    - endocrine following     Please make an appointment with Dr Sedrick Delaney @ [facility] [place] ([phone])  within one week of discharge  74 yo F with NSCLC with BMs and liver met (STK11 mutant, PIK3CA mutant, TP53 mutant, PD-L1 negative) admitted to [facility] w/ ctx resistant UTI, was found to be positive for influenza A   ?  # NSCLC w/ brain metastases  s/p SRS to the brain   [place] [facility] [place] was favored over carbo/pemetrexed/pembrolizumab due to CKD and baseline GFR less than 45.  C2 carbo/Taxol/pembro on 10/26/23 (dose reduced Taxol by 25%, carboplatin calculated dose for Cr of 1.3)    # T2DM   - Planned C3 11/26 held 2/2 severe hyperglycemia    - endocrine following     Please make an appointment with Dr Sedrick Delaney @ [facility] [place] ([phone])  within one week of discharge  74 yo F with NSCLC with BMs and liver met (STK11 mutant, PIK3CA mutant, TP53 mutant, PD-L1 negative) admitted to [facility] w/ ctx resistant UTI, was found to be positive for influenza A   ?  # NSCLC w/ brain metastases  s/p SRS to the brain   Received C1 paclitaxel/pembrolizumab on 9/7/2023 at [facility] [place]. This regimen was favored over carbo/pemetrexed/pembrolizumab due to CKD and baseline GFR less than 45.  C2 carbo/Taxol/pembro on 10/26/23 (dose reduced Taxol by 25%, carboplatin calculated dose for Cr of 1.3)    # T2DM   - Planned C3 11/26 held 2/2 severe hyperglycemia    - endocrine following     Please make an appointment with Dr Sedrick Delaney @ [facility] [place] ([phone])  within one week of discharge. Notify the oncology fellow asap as dc planning is in progress so her treatment can be scheduled within 4 days of dc. Please note we need 1.5 weeks of advanced notice to schedule treatment in infusion center. 74 yo F with NSCLC with BMs and liver met (STK11 mutant, PIK3CA mutant, TP53 mutant, PD-L1 negative) admitted to [facility] w/ ctx resistant UTI, was found to be positive for influenza A   ?  # NSCLC w/ brain metastases  s/p SRS to the brain   Received C1 paclitaxel/pembrolizumab on 9/7/2023 at [facility] [place]. This regimen was favored over carbo/pemetrexed/pembrolizumab due to CKD and baseline GFR less than 45.  C2 carbo/Taxol/pembro on 10/26/23 (dose reduced Taxol by 25%, carboplatin calculated dose for Cr of 1.3)    # T2DM   - Planned C3 11/26 held 2/2 severe hyperglycemia    - endocrine following     Please make an appointment with Dr Sedrick Delaney @ [facility] [place] ([phone])  within one week of discharge. Notify the oncology fellow asap as dc planning is in progress so her treatment can be scheduled within 4 days of dc. Please note we need 1.5 weeks of advanced notice to schedule treatment in infusion center.

## 2023-12-28 NOTE — PROGRESS NOTE ADULT - PROBLEM SELECTOR PLAN 3
Reports home . In ED,  Bhb negative. Took prednisone 5mg for the last two days  Home meds lantus 17U, lispro 5U before meals, januvia 100mg. A1c 11.5  s/p 5U humulin in ED  - c/w lantus 15U , moderate iSS with BG monitoring before meals and at bedtime  - f/u Endo recss

## 2023-12-28 NOTE — CONSULT NOTE ADULT - ATTENDING COMMENTS
72 yo F with metastatic NSCLC with BMs, s/p only 3 cycles of chemoIO over the last 4 months due to several admissions for uncontrolled DM, recurrent UTIs and RA flares.  It's been difficult to control her BS as outpatient as patient has not been set up with endocrinology quickly enough after each hospital discharge to manage her BS properly. She has difficult to control DM, and giving chemotherapy with steroids as premeds when BS>500 is contraindicated.  She has metastatic NSCLC, and palliative chemotherapy is given to obtain disease control and prolong OS.    Prior to discharge please ensure the followin. Scheduled follow up with endocrinology within 5 days of discharge with proper home monitoring and insulin doses  2. Scheduled follow up with oncology - please notify in advance the oncology fellow when planned dc is happening, so she is scheduled not only for follow up but also for chemotherapy. Please call the office at [phone] and ask to speak to supervisor Rosalinda Solis to get this done.  3. Completion of IV Abx as per ID recommendations for recurrent drug resistant UTI    Total time spent on encounter, including but not limited to counseling/coordination of care: 35 mis. 74 yo F with metastatic NSCLC with BMs, s/p only 3 cycles of chemoIO over the last 4 months due to several admissions for uncontrolled DM, recurrent UTIs and RA flares.  It's been difficult to control her BS as outpatient as patient has not been set up with endocrinology quickly enough after each hospital discharge to manage her BS properly. She has difficult to control DM, and giving chemotherapy with steroids as premeds when BS>500 is contraindicated.  She has metastatic NSCLC, and palliative chemotherapy is given to obtain disease control and prolong OS.    Prior to discharge please ensure the followin. Scheduled follow up with endocrinology within 5 days of discharge with proper home monitoring and insulin doses  2. Scheduled follow up with oncology - please notify in advance the oncology fellow when planned dc is happening, so she is scheduled not only for follow up but also for chemotherapy. Please call the office at [phone] and ask to speak to supervisor Rosalinda Solis to get this done.  3. Completion of IV Abx as per ID recommendations for recurrent drug resistant UTI    Total time spent on encounter, including but not limited to counseling/coordination of care: 35 mis.

## 2023-12-28 NOTE — CONSULT NOTE ADULT - ATTENDING COMMENTS
73-yo woman with HTN, HLP, type 2 DM, RA, NSCCA of the lung metastatic to brain and recurrent urosepsis has presented once again with signs and symptoms of the urosepsis.    Is known to us from many of her previous admissions, many of which were characterized by hyperglycemia secondary to either the UTI and/or steroid therapy for an exacerbation of the RA.  Her regimen after the last admission was 15 Lantus plus Januvia and Farxiga, though she says that she has increased the Lantus to 17 units, possibly in response to fingersticks during the past 2 weeks which have risen to the 300 range.    Compliance with her medication regimen at home is somewhat suspect, and the diet history as noted by Dr. Harmon above obviously includes excessive fruit, maple syrup, occasional regular soda and chocolate.  A1c level is markedly elevated at 11.5%  She denies any symptoms suggestive of an exacerbation of her RA, and remains on Prednisone 5 mg/day.  Fingersticks in the past 24 hours have been mostly >200, though she dropped to 186 this morning after 15 Lantus and only 2 units coverage.  --Her Lantus dose of 15 units seems sufficient, but will restart the premeal lispro of 6 units which was the dose near the end of her last hospitalization

## 2023-12-28 NOTE — CONSULT NOTE ADULT - SUBJECTIVE AND OBJECTIVE BOX
Hematology Consult Note    HPI:  73F PMH insulin dpdt TIID, NSCLC adenocarcinoma w mets to brain (s/p RT x1 07/2023, s/p chemo x2 (last tx 10/26/23),R hip replacement, RA, CKD IIIA, HTN and HLD presenting for persist dysuria and polyuria. She reports this morning she felt dizzy and checked her blood sugar which was 514.  Pt reports fall yesterday and the day before when she experienced dizziness and lost consciousness "for a second". She reports suprapubic tenderness and foul  smelling urine. She was seen in [facility] ED 12/14 for hyperglycemia 500s and UTI discharged cefpodoxime x 14 days which she reported she finished. Last UCx 12/14 showing Ucx >100k klebsiella oxytoca/raoutella ornithinolytica R to CTX. She reports she has been taking prednisone 5mg for last two days because it was cloudy and she was concerned for RA flare. She reports she drank cranberry juice this morning and has been urinating well since then. She denies fever, chills, LBP constipation/diarrhea, HA, dizziness at this time.    ED course  Vitals T 98.1, HR 62, /92, RR 18 O2 sat 96% RA  Labs WBC 10.13 with immature granulocyte predominance, no bandemia, Na 132 (adjusted for Glu 136), Glu 414, Bhb 0.1, . UA small leuk esterase, , glu >1000, Flu A positive  EKG NSR with APC HR 63, TWI V1 (present on previous EKG)  Imaging CTH no acute infarct/hemorrhage, chronic infarcts at R occipital lobe, chronic L internal capsule, L thalamus, L cerebellar infarcts  Intervention: humulin 5U, LR 1L (26 Dec 2023 23:32)      Allergies    citrus (Urticaria)  Bactrim (Rash)    Intolerances        MEDICATIONS  (STANDING):  amLODIPine   Tablet 10 milliGRAM(s) Oral daily  aspirin  chewable 81 milliGRAM(s) Oral daily  atorvastatin 40 milliGRAM(s) Oral at bedtime  cyanocobalamin 1000 MICROGram(s) Oral daily  dextrose 5%. 1000 milliLiter(s) (50 mL/Hr) IV Continuous <Continuous>  dextrose 5%. 1000 milliLiter(s) (100 mL/Hr) IV Continuous <Continuous>  dextrose 5%. 1000 milliLiter(s) (100 mL/Hr) IV Continuous <Continuous>  dextrose 5%. 1000 milliLiter(s) (50 mL/Hr) IV Continuous <Continuous>  dextrose 50% Injectable 25 Gram(s) IV Push once  dextrose 50% Injectable 12.5 Gram(s) IV Push once  dextrose 50% Injectable 25 Gram(s) IV Push once  dextrose 50% Injectable 25 Gram(s) IV Push once  dextrose 50% Injectable 12.5 Gram(s) IV Push once  dextrose 50% Injectable 25 Gram(s) IV Push once  ertapenem  IVPB 1000 milliGRAM(s) IV Intermittent every 24 hours  folic acid 1 milliGRAM(s) Oral daily  glucagon  Injectable 1 milliGRAM(s) IntraMuscular once  glucagon  Injectable 1 milliGRAM(s) IntraMuscular once  heparin   Injectable 5000 Unit(s) SubCutaneous every 8 hours  hydroxychloroquine 200 milliGRAM(s) Oral two times a day  influenza  Vaccine (HIGH DOSE) 0.7 milliLiter(s) IntraMuscular once  insulin glargine Injectable (LANTUS) 15 Unit(s) SubCutaneous at bedtime  insulin lispro (ADMELOG) corrective regimen sliding scale   SubCutaneous Before meals and at bedtime  insulin lispro Injectable (ADMELOG) 6 Unit(s) SubCutaneous three times a day before meals  predniSONE   Tablet 5 milliGRAM(s) Oral daily  sulfaSALAzine 1000 milliGRAM(s) Oral two times a day    MEDICATIONS  (PRN):  acetaminophen     Tablet .. 650 milliGRAM(s) Oral every 6 hours PRN Temp greater or equal to 38C (100.4F), Mild Pain (1 - 3)  dextrose Oral Gel 15 Gram(s) Oral once PRN Blood Glucose LESS THAN 70 milliGRAM(s)/deciliter  dextrose Oral Gel 15 Gram(s) Oral once PRN Blood Glucose LESS THAN 70 milliGRAM(s)/deciliter      PAST MEDICAL & SURGICAL HISTORY:  HTN (hypertension)      HLD (hyperlipidemia)      DM (diabetes mellitus), type 2      Rheumatoid arthritis      Stage 4 chronic kidney disease      Degenerative joint disease (DJD) of lumbar spine      Osteopenia      S/P total right hip arthroplasty          FAMILY HISTORY:  No pertinent family history in first degree relatives        SOCIAL HISTORY: No EtOH, no tobacco    REVIEW OF SYSTEMS:    CONSTITUTIONAL: No weakness, fevers or chills  EYES/ENT: No visual changes;  No vertigo or throat pain   NECK: No pain or stiffness  RESPIRATORY: No cough, wheezing, hemoptysis; No shortness of breath  CARDIOVASCULAR: No chest pain or palpitations  GASTROINTESTINAL: No abdominal or epigastric pain. No nausea, vomiting, or hematemesis; No diarrhea or constipation. No melena or hematochezia.  GENITOURINARY: No dysuria, frequency or hematuria  NEUROLOGICAL: No numbness or weakness  SKIN: No itching, burning, rashes, or lesions   All other review of systems is negative unless indicated above.        T(F): 98.3 (12-28-23 @ 10:48), Max: 98.3 (12-28-23 @ 10:48)  HR: 67 (12-28-23 @ 10:48)  BP: 147/71 (12-28-23 @ 10:48)  RR: 16 (12-28-23 @ 10:48)  SpO2: 98% (12-28-23 @ 10:48)  Wt(kg): --    GENERAL: NAD, well-developed  HEAD:  Atraumatic, Normocephalic  EYES: EOMI, PERRLA, conjunctiva and sclera clear  NECK: Supple, No JVD  CHEST/LUNG: Clear to auscultation bilaterally; No wheeze  HEART: Regular rate and rhythm; No murmurs, rubs, or gallops  ABDOMEN: Soft, Nontender, Nondistended; Bowel sounds present  EXTREMITIES:  2+ Peripheral Pulses, No clubbing, cyanosis, or edema  NEUROLOGY: non-focal  SKIN: No rashes or lesions                          11.6   6.64  )-----------( 187      ( 28 Dec 2023 05:30 )             36.1       12-28    135  |  99  |  27<H>  ----------------------------<  164<H>  4.1   |  29  |  1.23    Ca    9.1      28 Dec 2023 05:30  Phos  1.9     12-28  Mg     1.8     12-28    TPro  6.4  /  Alb  3.0<L>  /  TBili  0.3  /  DBili  x   /  AST  14  /  ALT  12  /  AlkPhos  85  12-28      Magnesium: 1.8 mg/dL (12-28 @ 05:30)  Phosphorus: 1.9 mg/dL (12-28 @ 05:30)    Onc Hx:  Ex-smoker with history of CKD, baseline creatinine is around 2, diabetes, rheumatoid arthritis, HTN.  She was experiencing 1 week of daily falls with lower extremity weakness without dizziness, was admitted to [facility].  MRI brain showed 0.9 cm right frontal lobe mass with surrounding vasogenic edema.  CT chest abdomen pelvis revealed left upper lobe mass 2.6 x 4.1 cm in posterior aspect of left upper lobe abutting major fissure. The mass extends to adjacent left hilum and laterally to left lateral pleura. 1.5 cm left lobe of liver lesion. 1.2 cm pancreatic body cystic lesion.  7/14/2023 bronchoscopy-lingular biopsy poorly differentiated adenocarcinoma, positive TTF-1, TMB 17.3, PD-L1 negative.  Tier II: Variants of Potential Clinical Significance  STK11 p.([id number])  PIK3CA p.([id number])  TP53 p.([id number])  Tier III: Variants of Unknown Clinical Significance  ALK p.([id number])  8/2023: She followed up at [facility] where she received her first chemotherapy cycle, Taxol/pembrolizumab only due to carboplatin shortage. Taxol was given because her labs there showed a GFR<45, excluding her from being able to receive pemetrexed. She tolerated treatment reasonably well, without any neuropathy or cytopenias. She lost he hair after first chemotherapy cycle.  9/5/2023: PET at [facility]: Left upper lobe perihilar hypermetabolic mass consistent with biopsy-proven malignancy. Mildly FDG avid right thyroid nodule, correlate with thyroid ultrasound.  9/7/2023: Received cycle 1 paclitaxel/pembrolizumab at [facility] (Cr 1.2 there)  10/10/2023: MRIB: Since 7/11/2023, right posterior frontal enhancing lesion and vasogenic edema are completely resolved as a favorable response to therapy. No new enhancing lesion.  ?    Disease: NSCLC    Pathology: Adenocarcinoma     [facility] ([facility]): Dr.Rosa Jeffrey  NeuroSx: Dr.D'Amico  [facility]: Dr. Wernicke     Current Treatment Status:.  9/7/2023: Taxol/pembrolizumab C1 given at [facility] (no carboplatin due to national shortage)  10/26/2023: C2 carboplatin/Taxol/pembrolizumab        Hematology Consult Note    HPI:  73F PMH insulin dpdt TIID, NSCLC adenocarcinoma w mets to brain (s/p RT x1 07/2023, s/p chemo x2 (last tx 10/26/23),R hip replacement, RA, CKD IIIA, HTN and HLD presenting for persist dysuria and polyuria. She reports this morning she felt dizzy and checked her blood sugar which was 514.  Pt reports fall yesterday and the day before when she experienced dizziness and lost consciousness "for a second". She reports suprapubic tenderness and foul  smelling urine. She was seen in [facility] ED 12/14 for hyperglycemia 500s and UTI discharged cefpodoxime x 14 days which she reported she finished. Last UCx 12/14 showing Ucx >100k klebsiella oxytoca/raoutella ornithinolytica R to CTX. She reports she has been taking prednisone 5mg for last two days because it was cloudy and she was concerned for RA flare. She reports she drank cranberry juice this morning and has been urinating well since then. She denies fever, chills, LBP constipation/diarrhea, HA, dizziness at this time.    ED course  Vitals T 98.1, HR 62, /92, RR 18 O2 sat 96% RA  Labs WBC 10.13 with immature granulocyte predominance, no bandemia, Na 132 (adjusted for Glu 136), Glu 414, Bhb 0.1, . UA small leuk esterase, , glu >1000, Flu A positive  EKG NSR with APC HR 63, TWI V1 (present on previous EKG)  Imaging CTH no acute infarct/hemorrhage, chronic infarcts at R occipital lobe, chronic L internal capsule, L thalamus, L cerebellar infarcts  Intervention: humulin 5U, LR 1L (26 Dec 2023 23:32)      Allergies    citrus (Urticaria)  Bactrim (Rash)    Intolerances        MEDICATIONS  (STANDING):  amLODIPine   Tablet 10 milliGRAM(s) Oral daily  aspirin  chewable 81 milliGRAM(s) Oral daily  atorvastatin 40 milliGRAM(s) Oral at bedtime  cyanocobalamin 1000 MICROGram(s) Oral daily  dextrose 5%. 1000 milliLiter(s) (50 mL/Hr) IV Continuous <Continuous>  dextrose 5%. 1000 milliLiter(s) (100 mL/Hr) IV Continuous <Continuous>  dextrose 5%. 1000 milliLiter(s) (100 mL/Hr) IV Continuous <Continuous>  dextrose 5%. 1000 milliLiter(s) (50 mL/Hr) IV Continuous <Continuous>  dextrose 50% Injectable 25 Gram(s) IV Push once  dextrose 50% Injectable 12.5 Gram(s) IV Push once  dextrose 50% Injectable 25 Gram(s) IV Push once  dextrose 50% Injectable 25 Gram(s) IV Push once  dextrose 50% Injectable 12.5 Gram(s) IV Push once  dextrose 50% Injectable 25 Gram(s) IV Push once  ertapenem  IVPB 1000 milliGRAM(s) IV Intermittent every 24 hours  folic acid 1 milliGRAM(s) Oral daily  glucagon  Injectable 1 milliGRAM(s) IntraMuscular once  glucagon  Injectable 1 milliGRAM(s) IntraMuscular once  heparin   Injectable 5000 Unit(s) SubCutaneous every 8 hours  hydroxychloroquine 200 milliGRAM(s) Oral two times a day  influenza  Vaccine (HIGH DOSE) 0.7 milliLiter(s) IntraMuscular once  insulin glargine Injectable (LANTUS) 15 Unit(s) SubCutaneous at bedtime  insulin lispro (ADMELOG) corrective regimen sliding scale   SubCutaneous Before meals and at bedtime  insulin lispro Injectable (ADMELOG) 6 Unit(s) SubCutaneous three times a day before meals  predniSONE   Tablet 5 milliGRAM(s) Oral daily  sulfaSALAzine 1000 milliGRAM(s) Oral two times a day    MEDICATIONS  (PRN):  acetaminophen     Tablet .. 650 milliGRAM(s) Oral every 6 hours PRN Temp greater or equal to 38C (100.4F), Mild Pain (1 - 3)  dextrose Oral Gel 15 Gram(s) Oral once PRN Blood Glucose LESS THAN 70 milliGRAM(s)/deciliter  dextrose Oral Gel 15 Gram(s) Oral once PRN Blood Glucose LESS THAN 70 milliGRAM(s)/deciliter      PAST MEDICAL & SURGICAL HISTORY:  HTN (hypertension)      HLD (hyperlipidemia)      DM (diabetes mellitus), type 2      Rheumatoid arthritis      Stage 4 chronic kidney disease      Degenerative joint disease (DJD) of lumbar spine      Osteopenia      S/P total right hip arthroplasty          FAMILY HISTORY:  No pertinent family history in first degree relatives        SOCIAL HISTORY: No EtOH, no tobacco    REVIEW OF SYSTEMS:    CONSTITUTIONAL: No weakness, fevers or chills  EYES/ENT: No visual changes;  No vertigo or throat pain   NECK: No pain or stiffness  RESPIRATORY: No cough, wheezing, hemoptysis; No shortness of breath  CARDIOVASCULAR: No chest pain or palpitations  GASTROINTESTINAL: No abdominal or epigastric pain. No nausea, vomiting, or hematemesis; No diarrhea or constipation. No melena or hematochezia.  GENITOURINARY: No dysuria, frequency or hematuria  NEUROLOGICAL: No numbness or weakness  SKIN: No itching, burning, rashes, or lesions   All other review of systems is negative unless indicated above.        T(F): 98.3 (12-28-23 @ 10:48), Max: 98.3 (12-28-23 @ 10:48)  HR: 67 (12-28-23 @ 10:48)  BP: 147/71 (12-28-23 @ 10:48)  RR: 16 (12-28-23 @ 10:48)  SpO2: 98% (12-28-23 @ 10:48)  Wt(kg): --    GENERAL: NAD, well-developed  HEAD:  Atraumatic, Normocephalic  EYES: EOMI, PERRLA, conjunctiva and sclera clear  NECK: Supple, No JVD  CHEST/LUNG: Clear to auscultation bilaterally; No wheeze  HEART: Regular rate and rhythm; No murmurs, rubs, or gallops  ABDOMEN: Soft, Nontender, Nondistended; Bowel sounds present  EXTREMITIES:  2+ Peripheral Pulses, No clubbing, cyanosis, or edema  NEUROLOGY: non-focal  SKIN: No rashes or lesions                          11.6   6.64  )-----------( 187      ( 28 Dec 2023 05:30 )             36.1       12-28    135  |  99  |  27<H>  ----------------------------<  164<H>  4.1   |  29  |  1.23    Ca    9.1      28 Dec 2023 05:30  Phos  1.9     12-28  Mg     1.8     12-28    TPro  6.4  /  Alb  3.0<L>  /  TBili  0.3  /  DBili  x   /  AST  14  /  ALT  12  /  AlkPhos  85  12-28      Magnesium: 1.8 mg/dL (12-28 @ 05:30)  Phosphorus: 1.9 mg/dL (12-28 @ 05:30)    Onc Hx:  Ex-smoker with history of CKD, baseline creatinine is around 2, diabetes, rheumatoid arthritis, HTN.  She was experiencing 1 week of daily falls with lower extremity weakness without dizziness, was admitted to [facility].  MRI brain showed 0.9 cm right frontal lobe mass with surrounding vasogenic edema.  CT chest abdomen pelvis revealed left upper lobe mass 2.6 x 4.1 cm in posterior aspect of left upper lobe abutting major fissure. The mass extends to adjacent left hilum and laterally to left lateral pleura. 1.5 cm left lobe of liver lesion. 1.2 cm pancreatic body cystic lesion.  7/14/2023 bronchoscopy-lingular biopsy poorly differentiated adenocarcinoma, positive TTF-1, TMB 17.3, PD-L1 negative.  Tier II: Variants of Potential Clinical Significance  STK11 p.([id number])  PIK3CA p.([id number])  TP53 p.([id number])  Tier III: Variants of Unknown Clinical Significance  ALK p.([id number])  8/2023: She followed up at [facility] where she received her first chemotherapy cycle, Taxol/pembrolizumab only due to carboplatin shortage. Taxol was given because her labs there showed a GFR<45, excluding her from being able to receive pemetrexed. She tolerated treatment reasonably well, without any neuropathy or cytopenias. She lost he hair after first chemotherapy cycle.  9/5/2023: PET at [facility]: Left upper lobe perihilar hypermetabolic mass consistent with biopsy-proven malignancy. Mildly FDG avid right thyroid nodule, correlate with thyroid ultrasound.  9/7/2023: Received cycle 1 paclitaxel/pembrolizumab at [facility] (Cr 1.2 there)  10/10/2023: MRIB: Since 7/11/2023, right posterior frontal enhancing lesion and vasogenic edema are completely resolved as a favorable response to therapy. No new enhancing lesion.  ?    Disease: NSCLC    Pathology: Adenocarcinoma     [facility] ([facility]): Dr.Rosa Jeffrey  NeuroSx: Dr.D'Amico  [facility]: Dr. Wernicke     Current Treatment Status:.  9/7/2023: Taxol/pembrolizumab C1 given at [facility] (no carboplatin due to national shortage)  10/26/2023: C2 carboplatin/Taxol/pembrolizumab

## 2023-12-29 ENCOUNTER — TRANSCRIPTION ENCOUNTER (OUTPATIENT)
Age: 73
End: 2023-12-29

## 2023-12-29 LAB
-  AMIKACIN: SIGNIFICANT CHANGE UP
-  AMIKACIN: SIGNIFICANT CHANGE UP
ALBUMIN SERPL ELPH-MCNC: 3.2 G/DL — LOW (ref 3.3–5)
ALBUMIN SERPL ELPH-MCNC: 3.2 G/DL — LOW (ref 3.3–5)
ALP SERPL-CCNC: 92 U/L — SIGNIFICANT CHANGE UP (ref 40–120)
ALP SERPL-CCNC: 92 U/L — SIGNIFICANT CHANGE UP (ref 40–120)
ALT FLD-CCNC: 10 U/L — SIGNIFICANT CHANGE UP (ref 10–45)
ALT FLD-CCNC: 10 U/L — SIGNIFICANT CHANGE UP (ref 10–45)
ANION GAP SERPL CALC-SCNC: 13 MMOL/L — SIGNIFICANT CHANGE UP (ref 5–17)
ANION GAP SERPL CALC-SCNC: 13 MMOL/L — SIGNIFICANT CHANGE UP (ref 5–17)
APPEARANCE UR: CLEAR — SIGNIFICANT CHANGE UP
APPEARANCE UR: CLEAR — SIGNIFICANT CHANGE UP
AST SERPL-CCNC: 16 U/L — SIGNIFICANT CHANGE UP (ref 10–40)
AST SERPL-CCNC: 16 U/L — SIGNIFICANT CHANGE UP (ref 10–40)
BACTERIA # UR AUTO: NEGATIVE /HPF — SIGNIFICANT CHANGE UP
BACTERIA # UR AUTO: NEGATIVE /HPF — SIGNIFICANT CHANGE UP
BILIRUB SERPL-MCNC: 0.3 MG/DL — SIGNIFICANT CHANGE UP (ref 0.2–1.2)
BILIRUB SERPL-MCNC: 0.3 MG/DL — SIGNIFICANT CHANGE UP (ref 0.2–1.2)
BILIRUB UR-MCNC: NEGATIVE — SIGNIFICANT CHANGE UP
BILIRUB UR-MCNC: NEGATIVE — SIGNIFICANT CHANGE UP
BUN SERPL-MCNC: 28 MG/DL — HIGH (ref 7–23)
BUN SERPL-MCNC: 28 MG/DL — HIGH (ref 7–23)
CALCIUM SERPL-MCNC: 9.2 MG/DL — SIGNIFICANT CHANGE UP (ref 8.4–10.5)
CALCIUM SERPL-MCNC: 9.2 MG/DL — SIGNIFICANT CHANGE UP (ref 8.4–10.5)
CAST: 2 /LPF — SIGNIFICANT CHANGE UP (ref 0–4)
CAST: 2 /LPF — SIGNIFICANT CHANGE UP (ref 0–4)
CHLORIDE SERPL-SCNC: 98 MMOL/L — SIGNIFICANT CHANGE UP (ref 96–108)
CHLORIDE SERPL-SCNC: 98 MMOL/L — SIGNIFICANT CHANGE UP (ref 96–108)
CO2 SERPL-SCNC: 26 MMOL/L — SIGNIFICANT CHANGE UP (ref 22–31)
CO2 SERPL-SCNC: 26 MMOL/L — SIGNIFICANT CHANGE UP (ref 22–31)
COLOR SPEC: YELLOW — SIGNIFICANT CHANGE UP
COLOR SPEC: YELLOW — SIGNIFICANT CHANGE UP
CREAT SERPL-MCNC: 1.28 MG/DL — SIGNIFICANT CHANGE UP (ref 0.5–1.3)
CREAT SERPL-MCNC: 1.28 MG/DL — SIGNIFICANT CHANGE UP (ref 0.5–1.3)
CULTURE RESULTS: ABNORMAL
CULTURE RESULTS: ABNORMAL
DIFF PNL FLD: NEGATIVE — SIGNIFICANT CHANGE UP
DIFF PNL FLD: NEGATIVE — SIGNIFICANT CHANGE UP
EGFR: 44 ML/MIN/1.73M2 — LOW
EGFR: 44 ML/MIN/1.73M2 — LOW
GLUCOSE BLDC GLUCOMTR-MCNC: 226 MG/DL — HIGH (ref 70–99)
GLUCOSE BLDC GLUCOMTR-MCNC: 226 MG/DL — HIGH (ref 70–99)
GLUCOSE BLDC GLUCOMTR-MCNC: 241 MG/DL — HIGH (ref 70–99)
GLUCOSE BLDC GLUCOMTR-MCNC: 241 MG/DL — HIGH (ref 70–99)
GLUCOSE BLDC GLUCOMTR-MCNC: 265 MG/DL — HIGH (ref 70–99)
GLUCOSE BLDC GLUCOMTR-MCNC: 265 MG/DL — HIGH (ref 70–99)
GLUCOSE BLDC GLUCOMTR-MCNC: 331 MG/DL — HIGH (ref 70–99)
GLUCOSE BLDC GLUCOMTR-MCNC: 331 MG/DL — HIGH (ref 70–99)
GLUCOSE SERPL-MCNC: 174 MG/DL — HIGH (ref 70–99)
GLUCOSE SERPL-MCNC: 174 MG/DL — HIGH (ref 70–99)
GLUCOSE UR QL: >=1000 MG/DL
GLUCOSE UR QL: >=1000 MG/DL
HCT VFR BLD CALC: 37.9 % — SIGNIFICANT CHANGE UP (ref 34.5–45)
HCT VFR BLD CALC: 37.9 % — SIGNIFICANT CHANGE UP (ref 34.5–45)
HGB BLD-MCNC: 12.2 G/DL — SIGNIFICANT CHANGE UP (ref 11.5–15.5)
HGB BLD-MCNC: 12.2 G/DL — SIGNIFICANT CHANGE UP (ref 11.5–15.5)
KETONES UR-MCNC: NEGATIVE MG/DL — SIGNIFICANT CHANGE UP
KETONES UR-MCNC: NEGATIVE MG/DL — SIGNIFICANT CHANGE UP
LEUKOCYTE ESTERASE UR-ACNC: ABNORMAL
LEUKOCYTE ESTERASE UR-ACNC: ABNORMAL
MAGNESIUM SERPL-MCNC: 1.7 MG/DL — SIGNIFICANT CHANGE UP (ref 1.6–2.6)
MAGNESIUM SERPL-MCNC: 1.7 MG/DL — SIGNIFICANT CHANGE UP (ref 1.6–2.6)
MCHC RBC-ENTMCNC: 29.5 PG — SIGNIFICANT CHANGE UP (ref 27–34)
MCHC RBC-ENTMCNC: 29.5 PG — SIGNIFICANT CHANGE UP (ref 27–34)
MCHC RBC-ENTMCNC: 32.2 GM/DL — SIGNIFICANT CHANGE UP (ref 32–36)
MCHC RBC-ENTMCNC: 32.2 GM/DL — SIGNIFICANT CHANGE UP (ref 32–36)
MCV RBC AUTO: 91.5 FL — SIGNIFICANT CHANGE UP (ref 80–100)
MCV RBC AUTO: 91.5 FL — SIGNIFICANT CHANGE UP (ref 80–100)
METHOD TYPE: SIGNIFICANT CHANGE UP
METHOD TYPE: SIGNIFICANT CHANGE UP
NITRITE UR-MCNC: NEGATIVE — SIGNIFICANT CHANGE UP
NITRITE UR-MCNC: NEGATIVE — SIGNIFICANT CHANGE UP
NRBC # BLD: 0 /100 WBCS — SIGNIFICANT CHANGE UP (ref 0–0)
NRBC # BLD: 0 /100 WBCS — SIGNIFICANT CHANGE UP (ref 0–0)
ORGANISM # SPEC MICROSCOPIC CNT: ABNORMAL
ORGANISM # SPEC MICROSCOPIC CNT: SIGNIFICANT CHANGE UP
ORGANISM # SPEC MICROSCOPIC CNT: SIGNIFICANT CHANGE UP
PH UR: 7 — SIGNIFICANT CHANGE UP (ref 5–8)
PH UR: 7 — SIGNIFICANT CHANGE UP (ref 5–8)
PHOSPHATE SERPL-MCNC: 2.6 MG/DL — SIGNIFICANT CHANGE UP (ref 2.5–4.5)
PHOSPHATE SERPL-MCNC: 2.6 MG/DL — SIGNIFICANT CHANGE UP (ref 2.5–4.5)
PLATELET # BLD AUTO: 186 K/UL — SIGNIFICANT CHANGE UP (ref 150–400)
PLATELET # BLD AUTO: 186 K/UL — SIGNIFICANT CHANGE UP (ref 150–400)
POTASSIUM SERPL-MCNC: 4.3 MMOL/L — SIGNIFICANT CHANGE UP (ref 3.5–5.3)
POTASSIUM SERPL-MCNC: 4.3 MMOL/L — SIGNIFICANT CHANGE UP (ref 3.5–5.3)
POTASSIUM SERPL-SCNC: 4.3 MMOL/L — SIGNIFICANT CHANGE UP (ref 3.5–5.3)
POTASSIUM SERPL-SCNC: 4.3 MMOL/L — SIGNIFICANT CHANGE UP (ref 3.5–5.3)
PROT SERPL-MCNC: 6.1 G/DL — SIGNIFICANT CHANGE UP (ref 6–8.3)
PROT SERPL-MCNC: 6.1 G/DL — SIGNIFICANT CHANGE UP (ref 6–8.3)
PROT UR-MCNC: SIGNIFICANT CHANGE UP MG/DL
PROT UR-MCNC: SIGNIFICANT CHANGE UP MG/DL
RBC # BLD: 4.14 M/UL — SIGNIFICANT CHANGE UP (ref 3.8–5.2)
RBC # BLD: 4.14 M/UL — SIGNIFICANT CHANGE UP (ref 3.8–5.2)
RBC # FLD: 14.5 % — SIGNIFICANT CHANGE UP (ref 10.3–14.5)
RBC # FLD: 14.5 % — SIGNIFICANT CHANGE UP (ref 10.3–14.5)
RBC CASTS # UR COMP ASSIST: 1 /HPF — SIGNIFICANT CHANGE UP (ref 0–4)
RBC CASTS # UR COMP ASSIST: 1 /HPF — SIGNIFICANT CHANGE UP (ref 0–4)
SODIUM SERPL-SCNC: 137 MMOL/L — SIGNIFICANT CHANGE UP (ref 135–145)
SODIUM SERPL-SCNC: 137 MMOL/L — SIGNIFICANT CHANGE UP (ref 135–145)
SP GR SPEC: 1.02 — SIGNIFICANT CHANGE UP (ref 1–1.03)
SP GR SPEC: 1.02 — SIGNIFICANT CHANGE UP (ref 1–1.03)
SPECIMEN SOURCE: SIGNIFICANT CHANGE UP
SPECIMEN SOURCE: SIGNIFICANT CHANGE UP
SQUAMOUS # UR AUTO: 3 /HPF — SIGNIFICANT CHANGE UP (ref 0–5)
SQUAMOUS # UR AUTO: 3 /HPF — SIGNIFICANT CHANGE UP (ref 0–5)
UROBILINOGEN FLD QL: 1 MG/DL — SIGNIFICANT CHANGE UP (ref 0.2–1)
UROBILINOGEN FLD QL: 1 MG/DL — SIGNIFICANT CHANGE UP (ref 0.2–1)
WBC # BLD: 7.23 K/UL — SIGNIFICANT CHANGE UP (ref 3.8–10.5)
WBC # BLD: 7.23 K/UL — SIGNIFICANT CHANGE UP (ref 3.8–10.5)
WBC # FLD AUTO: 7.23 K/UL — SIGNIFICANT CHANGE UP (ref 3.8–10.5)
WBC # FLD AUTO: 7.23 K/UL — SIGNIFICANT CHANGE UP (ref 3.8–10.5)
WBC UR QL: 2 /HPF — SIGNIFICANT CHANGE UP (ref 0–5)
WBC UR QL: 2 /HPF — SIGNIFICANT CHANGE UP (ref 0–5)

## 2023-12-29 PROCEDURE — 99232 SBSQ HOSP IP/OBS MODERATE 35: CPT | Mod: GC

## 2023-12-29 RX ORDER — INSULIN LISPRO 100/ML
VIAL (ML) SUBCUTANEOUS
Refills: 0 | Status: DISCONTINUED | OUTPATIENT
Start: 2023-12-29 | End: 2024-01-03

## 2023-12-29 RX ORDER — INSULIN GLARGINE 100 [IU]/ML
17 INJECTION, SOLUTION SUBCUTANEOUS AT BEDTIME
Refills: 0 | Status: DISCONTINUED | OUTPATIENT
Start: 2023-12-29 | End: 2023-12-29

## 2023-12-29 RX ORDER — INSULIN GLARGINE 100 [IU]/ML
20 INJECTION, SOLUTION SUBCUTANEOUS AT BEDTIME
Refills: 0 | Status: DISCONTINUED | OUTPATIENT
Start: 2023-12-29 | End: 2023-12-30

## 2023-12-29 RX ORDER — LOSARTAN POTASSIUM 100 MG/1
1 TABLET, FILM COATED ORAL
Qty: 0 | Refills: 0 | DISCHARGE

## 2023-12-29 RX ADMIN — Medication 6 UNIT(S): at 18:23

## 2023-12-29 RX ADMIN — HEPARIN SODIUM 5000 UNIT(S): 5000 INJECTION INTRAVENOUS; SUBCUTANEOUS at 22:06

## 2023-12-29 RX ADMIN — Medication 1000 MILLIGRAM(S): at 06:43

## 2023-12-29 RX ADMIN — Medication 1 MILLIGRAM(S): at 11:22

## 2023-12-29 RX ADMIN — Medication 8: at 13:22

## 2023-12-29 RX ADMIN — HEPARIN SODIUM 5000 UNIT(S): 5000 INJECTION INTRAVENOUS; SUBCUTANEOUS at 13:25

## 2023-12-29 RX ADMIN — HEPARIN SODIUM 5000 UNIT(S): 5000 INJECTION INTRAVENOUS; SUBCUTANEOUS at 06:42

## 2023-12-29 RX ADMIN — AMLODIPINE BESYLATE 10 MILLIGRAM(S): 2.5 TABLET ORAL at 06:43

## 2023-12-29 RX ADMIN — Medication 1000 MILLIGRAM(S): at 18:23

## 2023-12-29 RX ADMIN — Medication 6 UNIT(S): at 09:45

## 2023-12-29 RX ADMIN — ATORVASTATIN CALCIUM 40 MILLIGRAM(S): 80 TABLET, FILM COATED ORAL at 22:06

## 2023-12-29 RX ADMIN — ERTAPENEM SODIUM 120 MILLIGRAM(S): 1 INJECTION, POWDER, LYOPHILIZED, FOR SOLUTION INTRAMUSCULAR; INTRAVENOUS at 15:53

## 2023-12-29 RX ADMIN — Medication 5 MILLIGRAM(S): at 06:43

## 2023-12-29 RX ADMIN — Medication 6 UNIT(S): at 13:21

## 2023-12-29 RX ADMIN — Medication 200 MILLIGRAM(S): at 18:23

## 2023-12-29 RX ADMIN — Medication 4: at 22:06

## 2023-12-29 RX ADMIN — Medication 81 MILLIGRAM(S): at 11:22

## 2023-12-29 RX ADMIN — PREGABALIN 1000 MICROGRAM(S): 225 CAPSULE ORAL at 11:22

## 2023-12-29 RX ADMIN — Medication 200 MILLIGRAM(S): at 06:42

## 2023-12-29 RX ADMIN — INSULIN GLARGINE 20 UNIT(S): 100 INJECTION, SOLUTION SUBCUTANEOUS at 22:06

## 2023-12-29 RX ADMIN — Medication 4: at 18:24

## 2023-12-29 NOTE — DISCHARGE NOTE PROVIDER - CARE PROVIDERS DIRECT ADDRESSES
,[email or web address]

## 2023-12-29 NOTE — DISCHARGE NOTE PROVIDER - NSDCFUSCHEDAPPT_GEN_ALL_CORE_FT
Anjana Johnston  [facility] Physician [facility]  RHEUM [address]  Scheduled Appointment: 01/04/2024    Leonard Almanzar  [facility]  ORTHOSURG [address]  Scheduled Appointment: 01/26/2024    Marissa Casarez  [facility]  HEARTVASC [address]  Scheduled Appointment: 03/07/2024     Anjana Johnston  [facility] Physician [facility]  [address]HEUM [address]  Scheduled Appointment: 01/04/2024    Leonard Almanzar  [facility]  ORTHOSURG [address]  Scheduled Appointment: 01/26/2024    Marissa Casarez  [facility]  HEARTVASC [address]  Scheduled Appointment: 03/07/2024     Anjana Johnston  [facility] Physician [facility]  [address]  Scheduled Appointment: 01/04/2024    [facility]  AMBCHEMO  [address]  Scheduled Appointment: 01/09/2024    Leonard Almanzar  [facility]  ORTHOSURG [address]  Scheduled Appointment: 01/26/2024    Marissa Casarez  [facility]  HEARTVASC [address]  Scheduled Appointment: 03/07/2024     Anjana Johnston  [facility] Physician [facility]  [address]HEUM [address]  Scheduled Appointment: 01/04/2024    [facility]  AMBCHEMO  [address]  Scheduled Appointment: 01/09/2024    Leonard Almanzar  [facility]  ORTHOSURG [address]  Scheduled Appointment: 01/26/2024    Marissa Casarez  [facility]  HEARTVASC [address]  Scheduled Appointment: 03/07/2024     Anjana Johnston  [facility] Physician [facility]  [address]UM [address]  Scheduled Appointment: 01/04/2024    Elkin Boyce  [facility] Physician [facility]  ENDOCRIN [address]  Scheduled Appointment: 01/04/2024    [facility]  AMBCHEMO  [address]  Scheduled Appointment: 01/09/2024    Leonard Almanzar  [facility]  ORTHOSURG [address]  Scheduled Appointment: 01/26/2024    Marissa Casarez  [facility]  HEARTVASC [address]  Scheduled Appointment: 03/07/2024     Anjana Johnston  [facility] Physician [facility]  [address]HEUM [address]  Scheduled Appointment: 01/04/2024    Elkin Boyce  [facility] Physician [facility]  ENDOCRIN [address]  Scheduled Appointment: 01/04/2024    [facility]  AMBCHEMO  [address]  Scheduled Appointment: 01/09/2024    Leonard Almanzar  [facility]  ORTHOSURG [address]  Scheduled Appointment: 01/26/2024    Marissa Casarez  [facility]  HEARTVASC [address]  Scheduled Appointment: 03/07/2024

## 2023-12-29 NOTE — DISCHARGE NOTE PROVIDER - NSDCFUADDAPPT_GEN_ALL_CORE_FT
Please follow up with:     Dr. Sedrick Delaney   [facility] [place]   [address], [place], [place], [address] [phone]  1/9/24 at 11:30 AM  Please follow up with:     Dr. Sedrick Delaney   [facility] [place]   [address], [place], [address] [phone]  1/9/24 at 11:30 AM  Please follow up with:     Dr. Sedrick Delaney   [facility] [place]   [address], [address]  [phone]  1/9/24 at 11:30 AM     Dr Von Boyce  [facility] Endocrinology   [address] 8th floor   [address]   1/4/2024 @ 1:20pm   Please follow up with:     Dr. Sedrick Delaney   [facility] [place]   [address], [address]  [phone]  1/9/24 at 11:30 AM     Dr Vno Boyce  [facility] Endocrinology   [address] 8th floor   [address]   1/4/2024 @ 1:20pm   Please follow up with:     (1) Dr. Sedrick Delaney   [facility] [place]   [address], [address]  [phone]  1/9/24 at 11:30 AM     (2) Dr Navdeep Boyce  [facility] Endocrinology   [address] 8th floor   [address]   1/4/2024 @ 1:20pm      Please bring your Insurance card, Photo ID and Discharge paperwork to the following appointment:    (3) Please follow up with your Primary Care Provider, Dr. Marissa Douglass at [address] [place] [phone] on 01/31/2024 at 4:00pm.    Appointment was scheduled by Ms. CHUCK Spain, Referral Coordinator.     Please follow up with:     (1) Dr. Sedrick Delaney   [facility] [place]   [address], [address]  [phone]  1/9/24 at 11:30 AM     (2) Dr Navdeep Boyce  [facility] Endocrinology   [address] 8[address]   [address]   1/4/2024 @ 1:20pm      Please bring your Insurance card, Photo ID and Discharge paperwork to the following appointment:    (3) Please follow up with your Primary Care Provider, Dr. Marissa Douglass at [address] [place] [phone] on 01/31/2024 at 4:00pm.    Appointment was scheduled by Ms. CHUCK Spain, Referral Coordinator.

## 2023-12-29 NOTE — PROGRESS NOTE ADULT - PROBLEM SELECTOR PLAN 1
Presenting with persistent dysuria and polyuria  Presented to [facility] ED 11/16 for UTI discharged on cefpodoxime x 14 days which pt completed  Ucx 11/16 and 12/14 showed 100k  klebsiella oxytoca/raoutella ornithinolytica resistant to CTX. Was taking farxiga, prednisone 5mg   Afebrile, not septic  Reports  - ID approval for ertapenem 3 day course, f/u once sensitivties arise   - hold farxiga iso UTI Presenting with persistent dysuria and polyuria  Presented to [facility] ED 11/16 for UTI discharged on cefpodoxime x 14 days which pt completed  Ucx 11/16 and 12/14 showed 100k  klebsiella oxytoca/raoutella ornithinolytica resistant to CTX. Was taking farxiga, prednisone 5mg   Afebrile, not septic  Reports  - ID approval for ertapenem 3 day course, f/u once sensitivties arise   - hold farxiga iso UTI

## 2023-12-29 NOTE — DISCHARGE NOTE PROVIDER - CARE PROVIDER_API CALL
Marissa Douglass J  Critical Care Medicine  [address] [address]  Phone: [phone]  Fax: [phone]  Follow Up Time:    Marissa Douglass J  Critical Care Medicine  [address] [address]  Phone: [phone]  Fax: [phone]  Follow Up Time:    Marissa Douglass J  Critical Care Medicine  [address] [address]  Phone: [phone]  Fax: [phone]  Established Patient  Scheduled Appointment: 01/31/2024 04:00 PM   Marissa Douglass J  Critical Care Medicine  [address] [address]  Phone: [phone]  Fax: [phone]  Established Patient  Scheduled Appointment: 01/31/2024 04:00 PM

## 2023-12-29 NOTE — DISCHARGE NOTE PROVIDER - HOSPITAL COURSE
#Discharge: do not delete    73F PMH insulin dpdt TIID, NSCLC adenocarcinoma w mets to brain (s/p RT x1 07/2023, s/p chemo x2 (last tx 11/16/23),R hip replacement, RA, CKD IIIA, HTN and HLD presenting for hyperglycemia and polyuria admitted for resistant UTI.          Problem List/Main Diagnoses (system-based):  # Acute UTI.   Plan: Presenting with persistent dysuria and polyuria  Presented to [facility] ED 11/16 for UTI discharged on cefpodoxime x 14 days which pt completed  Ucx 11/16 and 12/14 showed 100k  klebsiella oxytoca/raoutella ornithinolytica resistant to CTX. Was taking farxiga, prednisone 5mg   Afebrile, not septic  Reports  - s/p ertapenem 3 day course   - hold farxiga iso UTI.    #Fall.   Plan: Pt reports dizziness that led to falls x 2 days with head strike. Denies AC use. Pt reports dizziness prior to fall  CTH shows no acute hemorrhage/infarct. EKG showing NSR with APC rate 56  s/p 1L LR  Fall possibly 2/2 vasovagal vs bradycardia vs APC  - negative orthostatic   - hold toprol 100mg qd  - consider outpatient holter monitor.    # Insulin dependent type 2 diabetes mellitus.   ·  Plan: Reports home . In ED,  Bhb negative. Took prednisone 5mg for the last two days Has had markely elevated bg levels inpatient and difficulties with complaince outpaitnet.   Home meds lantus 17U, lispro 5U before meals, januvia 100mg. A1c 11.5  - c/w lantus 17U bedtime and 6 units pre meal  - c/w januvia 100mg at home      #NSCLC metastatic to brain.   ·  Plan: Follows with Dr Delaney  Last chemo session 11/16- has been holding chemo sessions due to hyperglycemia  MR head 10/23 showing R posterior frontal enhancing lesion with vasogenic edema which resolved on [facility] 10/23  Has port placed at [facility] 9/2023 per patient- site clean and dry reports last accessed 11/2023 for possible chemo  - f/u outpatient  - ensure DM under control as per endo recs above       Patient was discharged to: HOME OR [facility]          New medications:na    Changes to old medications: na    Medications that were stopped: Farxiga          Items to follow up as outpatient: DM,          Physical exam at the time of discharge: #Discharge: do not delete    73F PMH insulin dpdt TIID, NSCLC adenocarcinoma w mets to brain (s/p RT x1 07/2023, s/p chemo x2 (last tx 11/16/23),R hip replacement, RA, CKD IIIA, HTN and HLD presenting for hyperglycemia and polyuria admitted for resistant UTI.          Problem List/Main Diagnoses (system-based):  # Acute UTI.   Plan: Presenting with persistent dysuria and polyuria  Presented to [facility] ED 11/16 for UTI discharged on cefpodoxime x 14 days which pt completed  Ucx 11/16 and 12/14 showed 100k  klebsiella oxytoca/raoutella ornithinolytica resistant to CTX. Was taking farxiga, prednisone 5mg   Afebrile, not septic  Reports  - s/p ertapenem 3 day course   - hold farxiga iso UTI.    #Fall.   Plan: Pt reports dizziness that led to falls x 2 days with head strike. Denies AC use. Pt reports dizziness prior to fall  CTH shows no acute hemorrhage/infarct. EKG showing NSR with APC rate 56  s/p 1L LR  Fall possibly 2/2 vasovagal vs bradycardia vs APC  - negative orthostatic   - hold toprol 100mg qd  - consider outpatient holter monitor.    # Insulin dependent type 2 diabetes mellitus.   ·  Plan: Reports home . In ED,  Bhb negative. Took prednisone 5mg for the last two days Has had markely elevated bg levels inpatient and difficulties with complaince outpaitnet.   Home meds lantus 17U, lispro 5U before meals, januvia 100mg. A1c 11.5  - c/w lantus 17U bedtime and 6 units pre meal  - c/w januvia 100mg at home      #NSCLC metastatic to brain.   ·  Plan: Follows with Dr Delaney  Last chemo session 11/16- has been holding chemo sessions due to hyperglycemia  MR head 10/23 showing R posterior frontal enhancing lesion with vasogenic edema which resolved on [facility] 10/23  Has port placed at [facility] 9/2023 per patient- site clean and dry reports last accessed 11/2023 for possible chemo  - f/u outpatient  - ensure DM under control as per endo recs above       Patient was discharged to: HOME OR [facility]          New medications:na    Changes to old medications: na    Medications that were stopped: Farxiga          Items to follow up as outpatient: DM,          Physical exam at the time of discharge: #Discharge: do not delete    73F PMH insulin dpdt TIID, NSCLC adenocarcinoma w mets to brain (s/p RT x1 07/2023, s/p chemo x2 (last tx 11/16/23),R hip replacement, RA, CKD IIIA, HTN and HLD presenting for hyperglycemia and polyuria admitted for resistant UTI.          Problem List/Main Diagnoses (system-based):  # Acute UTI.   Plan: Presenting with persistent dysuria and polyuria  Presented to [facility] ED 11/16 for UTI discharged on cefpodoxime x 14 days which pt completed  Ucx 11/16 and 12/14 showed 100k  klebsiella oxytoca/raoutella ornithinolytica resistant to CTX. Was taking farxiga, prednisone 5mg   Afebrile, not septic  Reports  - s/p ertapenem 3 day course   - hold farxiga iso UTI.    #Fall.   Plan: Pt reports dizziness that led to falls x 2 days with head strike. Denies AC use. Pt reports dizziness prior to fall  CTH shows no acute hemorrhage/infarct. EKG showing NSR with APC rate 56  s/p 1L LR  Fall possibly 2/2 vasovagal vs bradycardia vs APC  - negative orthostatic   - hold toprol 100mg qd  - consider outpatient holter monitor.    # Insulin dependent type 2 diabetes mellitus.   Plan: Reports home . In ED,  Bhb negative. Took prednisone 5mg for the last two days Has had markely elevated bg levels inpatient and difficulties with complaince. Required 22units lantus at bedtime and 10 pre-meal during inpatient stay. Final endo recs below    -c/w Lantus 17 units at bedtime + januvia 100mg daily,   -start Pioglitazone 15 daily   discontinue Farxiga in setting of recurrent UTI     #NSCLC metastatic to brain.   Plan: Follows with Dr Delaney  Last chemo session 11/16- has been holding chemo sessions due to hyperglycemia  MR head 10/23 showing R posterior frontal enhancing lesion with vasogenic edema which resolved on [facility] 10/23  Has port placed at [facility] 9/2023 per patient- site clean and dry reports last accessed 11/2023 for possible chemo  - f/u outpatient  - ensure DM under control as per endo recs above       Patient was discharged to: Home with pt          New medications: Pioglitazone 15mg qd     Changes to old medications: DC farxiga     Medications that were stopped: Farxiga          Items to follow up as outpatient: DM, Cancer          Physical exam at the time of discharge:  Constitutional: Awake, alert, in no acute distress.   HEENT: Normocephalic, atraumatic, NEDRA.  Respiratory: Lungs clear to ausculation bilaterally.   Cardiovascular: regular rhythm, normal S1 and S2, no audible murmurs.   GI: soft, non-tender, non-distended, bowel sounds present.  Extremities: No lower extremity edema.  Psychiatric: AAO x 3. Normal affect/mood. #Discharge: do not delete    73F PMH insulin dpdt TIID, NSCLC adenocarcinoma w mets to brain (s/p RT x1 07/2023, s/p chemo x2 (last tx 11/16/23),R hip replacement, RA, CKD IIIA, HTN and HLD presenting for hyperglycemia and polyuria admitted for resistant UTI.          Problem List/Main Diagnoses (system-based):  # Acute UTI.   Plan: Presenting with persistent dysuria and polyuria  Presented to [facility] ED 11/16 for UTI discharged on cefpodoxime x 14 days which pt completed  Ucx 11/16 and 12/14 showed 100k  klebsiella oxytoca/raoutella ornithinolytica resistant to CTX. Was taking farxiga, prednisone 5mg   Afebrile, not septic  Reports  - s/p ertapenem 3 day course   - hold farxiga iso UTI.    #Fall.   Plan: Pt reports dizziness that led to falls x 2 days with head strike. Denies AC use. Pt reports dizziness prior to fall  CTH shows no acute hemorrhage/infarct. EKG showing NSR with APC rate 56  s/p 1L LR  Fall possibly 2/2 vasovagal vs bradycardia vs APC  - negative orthostatic   - hold toprol 100mg qd  - consider outpatient holter monitor.    # Insulin dependent type 2 diabetes mellitus.   Plan: Reports home . In ED,  Bhb negative. Took prednisone 5mg for the last two days Has had markely elevated bg levels inpatient and difficulties with complaince. Required 22units lantus at bedtime and 10 pre-meal during inpatient stay. Final endo recs below    -c/w Lantus 17 units at bedtime + januvia 100mg daily,   -start Pioglitazone 15 daily   discontinue Farxiga in setting of recurrent UTI     #NSCLC metastatic to brain.   Plan: Follows with Dr Delaeny  Last chemo session 11/16- has been holding chemo sessions due to hyperglycemia  MR head 10/23 showing R posterior frontal enhancing lesion with vasogenic edema which resolved on [facility] 10/23  Has port placed at [facility] 9/2023 per patient- site clean and dry reports last accessed 11/2023 for possible chemo  - f/u outpatient  - ensure DM under control as per endo recs above       Patient was discharged to: Home with pt          New medications: Pioglitazone 15mg qd     Changes to old medications: DC farxiga     Medications that were stopped: Farxiga          Items to follow up as outpatient: DM, Cancer          Physical exam at the time of discharge:  Constitutional: Awake, alert, in no acute distress.   HEENT: Normocephalic, atraumatic, NEDRA.  Respiratory: Lungs clear to ausculation bilaterally.   Cardiovascular: regular rhythm, normal S1 and S2, no audible murmurs.   GI: soft, non-tender, non-distended, bowel sounds present.  Extremities: No lower extremity edema.  Psychiatric: AAO x 3. Normal affect/mood. #Discharge: do not delete    73F PMH insulin dpdt TIID, NSCLC adenocarcinoma w mets to brain (s/p RT x1 07/2023, s/p chemo x2 (last tx 11/16/23),R hip replacement, RA, CKD IIIA, HTN and HLD presenting for hyperglycemia and polyuria admitted for resistant UTI.          Problem List/Main Diagnoses (system-based):  # Acute UTI.   Plan: Presenting with persistent dysuria and polyuria  Presented to [facility] ED 11/16 for UTI discharged on cefpodoxime x 14 days which pt completed  Ucx 11/16 and 12/14 showed 100k  klebsiella oxytoca/raoutella ornithinolytica resistant to CTX. Was taking farxiga, prednisone 5mg   Afebrile, not septic  Reports  - s/p ertapenem 3 day course       #Fall.   Plan: Pt reports dizziness that led to falls x 2 days with head strike. Denies AC use. Pt reports dizziness prior to fall  CTH shows no acute hemorrhage/infarct. EKG showing NSR with APC rate 56  s/p 1L LR  Fall possibly 2/2 vasovagal vs bradycardia vs APC  - negative orthostatic   - hold toprol 100mg qd  - consider outpatient holter monitor.    # Insulin dependent type 2 diabetes mellitus.   Plan: Reports home . In ED,  Bhb negative. Took prednisone 5mg for the last two days Has had markely elevated bg levels inpatient and difficulties with complaince. Required 22units lantus at bedtime and 10 pre-meal during inpatient stay. Final endo recs below    -c/w Lantus 17 units at bedtime + januvia 100mg daily,   -start Pioglitazone 15 daily   discontinue Farxiga in setting of recurrent UTI     #NSCLC metastatic to brain.   Plan: Follows with Dr Delaney  Last chemo session 11/16- has been holding chemo sessions due to hyperglycemia  MR head 10/23 showing R posterior frontal enhancing lesion with vasogenic edema which resolved on [facility] 10/23  Has port placed at [facility] 9/2023 per patient- site clean and dry reports last accessed 11/2023 for possible chemo  - f/u outpatient  - ensure DM under control as per endo recs above       Patient was discharged to: Home with pt          New medications: Pioglitazone 15mg qd     Changes to old medications: DC farxiga     Medications that were stopped: Farxiga          Items to follow up as outpatient: DM, Cancer          Physical exam at the time of discharge:  Constitutional: Awake, alert, in no acute distress.   HEENT: Normocephalic, atraumatic, NEDRA.  Respiratory: Lungs clear to ausculation bilaterally.   Cardiovascular: regular rhythm, normal S1 and S2, no audible murmurs.   GI: soft, non-tender, non-distended, bowel sounds present.  Extremities: No lower extremity edema.  Psychiatric: AAO x 3. Normal affect/mood. #Discharge: do not delete    73F PMH insulin dpdt TIID, NSCLC adenocarcinoma w mets to brain (s/p RT x1 07/2023, s/p chemo x2 (last tx 11/16/23),R hip replacement, RA, CKD IIIA, HTN and HLD presenting for hyperglycemia and polyuria admitted for resistant UTI.          Problem List/Main Diagnoses (system-based):  # Acute UTI.   Plan: Presenting with persistent dysuria and polyuria  Presented to [facility] ED 11/16 for UTI discharged on cefpodoxime x 14 days which pt completed  Ucx 11/16 and 12/14 showed 100k  klebsiella oxytoca/raoutella ornithinolytica resistant to CTX. Was taking farxiga, prednisone 5mg   Afebrile, not septic  Reports  - s/p ertapenem 3 day course       #Fall.   Plan: Pt reports dizziness that led to falls x 2 days with head strike. Denies AC use. Pt reports dizziness prior to fall  CTH shows no acute hemorrhage/infarct. EKG showing NSR with APC rate 56  s/p 1L LR  Fall possibly 2/2 vasovagal vs bradycardia vs APC  - negative orthostatic   - hold toprol 100mg qd  - consider outpatient holter monitor.    # Insulin dependent type 2 diabetes mellitus.   Plan: Reports home . In ED,  Bhb negative. Took prednisone 5mg for the last two days Has had markely elevated bg levels inpatient and difficulties with complaince. Required 22units lantus at bedtime and 10 pre-meal during inpatient stay. Final endo recs below    -c/w Lantus 17 units at bedtime + januvia 100mg daily,   -start Pioglitazone 15 daily   discontinue Farxiga in setting of recurrent UTI     #NSCLC metastatic to brain.   Plan: Follows with Dr Delaney  Last chemo session 11/16- has been holding chemo sessions due to hyperglycemia  MR head 10/23 showing R posterior frontal enhancing lesion with vasogenic edema which resolved on [facility] 10/23  Has port placed at [facility] 9/2023 per patient- site clean and dry reports last accessed 11/2023 for possible chemo  - f/u outpatient  - ensure DM under control as per endo recs above       Patient was discharged to: Home with pt          New medications: Pioglitazone 15mg qd     Changes to old medications: DC farxiga     Medications that were stopped: Farxiga          Items to follow up as outpatient: DM, Cancer          Physical exam at the time of discharge:  Constitutional: Awake, alert, in no acute distress.   HEENT: Normocephalic, atraumatic, NEDRA.  Respiratory: Lungs clear to ausculation bilaterally.   Cardiovascular: regular rhythm, normal S1 and S2, no audible murmurs.   GI: soft, non-tender, non-distended, bowel sounds present.  Extremities: No lower extremity edema.  Psychiatric: AAO x 3. Normal affect/mood. #Discharge: do not delete    73F PMH insulin dpdt TIID, NSCLC adenocarcinoma w mets to brain (s/p RT x1 07/2023, s/p chemo x2 (last tx 11/16/23),R hip replacement, RA, CKD IIIA, HTN and HLD presenting for hyperglycemia and polyuria admitted for resistant UTI.          Problem List/Main Diagnoses (system-based):  # Acute UTI.   Plan: Presenting with persistent dysuria and polyuria  Presented to [facility] ED 11/16 for UTI discharged on cefpodoxime x 14 days which pt completed  Ucx 11/16 and 12/14 showed 100k  klebsiella oxytoca/raoutella ornithinolytica resistant to CTX. Was taking farxiga, prednisone 5mg   Afebrile, not septic  Reports  - s/p ertapenem 3 day course       #Fall.   Plan: Pt reports dizziness that led to falls x 2 days with head strike. Denies AC use. Pt reports dizziness prior to fall  CTH shows no acute hemorrhage/infarct. EKG showing NSR with APC rate 56  s/p 1L LR  Fall possibly 2/2 vasovagal vs bradycardia vs APC  - negative orthostatic   - hold toprol 100mg qd  - consider outpatient holter monitor.  - follow up with PCP regarding continuation of metroprolol     # Insulin dependent type 2 diabetes mellitus.   Plan: Reports home . In ED,  Bhb negative. Took prednisone 5mg for the last two days Has had markely elevated bg levels inpatient and difficulties with complaince. Required 22units lantus at bedtime and 10 pre-meal during inpatient stay. Final endo recs below    -c/w Lantus 17 units at bedtime + januvia 100mg daily,   -start Pioglitazone 15 daily   discontinue Farxiga in setting of recurrent UTI     #NSCLC metastatic to brain.   Plan: Follows with Dr Delaney  Last chemo session 11/16- has been holding chemo sessions due to hyperglycemia  MR head 10/23 showing R posterior frontal enhancing lesion with vasogenic edema which resolved on [facility] 10/23  Has port placed at [facility] 9/2023 per patient- site clean and dry reports last accessed 11/2023 for possible chemo  - f/u outpatient  - ensure DM under control as per endo recs above       Patient was discharged to: Home with pt          New medications: Pioglitazone 15mg qd     Changes to old medications: DC farxiga     Medications that were stopped: Farxiga, metoprolol          Items to follow up as outpatient: DM, Cancer          Physical exam at the time of discharge:  Constitutional: Awake, alert, in no acute distress.   HEENT: Normocephalic, atraumatic, NEDRA.  Respiratory: Lungs clear to ausculation bilaterally.   Cardiovascular: regular rhythm, normal S1 and S2, no audible murmurs.   GI: soft, non-tender, non-distended, bowel sounds present.  Extremities: No lower extremity edema.  Psychiatric: AAO x 3. Normal affect/mood. #Discharge: do not delete    73F PMH insulin dpdt TIID, NSCLC adenocarcinoma w mets to brain (s/p RT x1 07/2023, s/p chemo x2 (last tx 11/16/23),R hip replacement, RA, CKD IIIA, HTN and HLD presenting for hyperglycemia and polyuria admitted for resistant UTI.          Problem List/Main Diagnoses (system-based):  # Acute UTI.   Plan: Presenting with persistent dysuria and polyuria  Presented to [facility] ED 11/16 for UTI discharged on cefpodoxime x 14 days which pt completed  Ucx 11/16 and 12/14 showed 100k  klebsiella oxytoca/raoutella ornithinolytica resistant to CTX. Was taking farxiga, prednisone 5mg   Afebrile, not septic  Reports  - s/p ertapenem 3 day course       #Fall.   Plan: Pt reports dizziness that led to falls x 2 days with head strike. Denies AC use. Pt reports dizziness prior to fall  CTH shows no acute hemorrhage/infarct. EKG showing NSR with APC rate 56  s/p 1L LR  Fall possibly 2/2 vasovagal vs bradycardia vs APC  - negative orthostatic   - hold toprol 100mg qd  - consider outpatient holter monitor.  - follow up with PCP regarding continuation of metroprolol     # Insulin dependent type 2 diabetes mellitus.   Plan: Reports home . In ED,  Bhb negative. Took prednisone 5mg for the last two days Has had markely elevated bg levels inpatient and difficulties with complaince. Required 22units lantus at bedtime and 10 pre-meal during inpatient stay. Final endo recs below    -c/w Lantus 17 units at bedtime + januvia 100mg daily,   -start Pioglitazone 15 daily   discontinue Farxiga in setting of recurrent UTI     #NSCLC metastatic to brain.   Plan: Follows with Dr Delaney  Last chemo session 11/16- has been holding chemo sessions due to hyperglycemia  MR head 10/23 showing R posterior frontal enhancing lesion with vasogenic edema which resolved on [facility] 10/23  Has port placed at [facility] 9/2023 per patient- site clean and dry reports last accessed 11/2023 for possible chemo  - f/u outpatient  - ensure DM under control as per endo recs above       Patient was discharged to: Home with pt          New medications: Pioglitazone 15mg qd     Changes to old medications: DC farxiga     Medications that were stopped: Farxiga, metoprolol          Items to follow up as outpatient: DM, Cancer          Physical exam at the time of discharge:  Constitutional: Awake, alert, in no acute distress.   HEENT: Normocephalic, atraumatic, NEDRA.  Respiratory: Lungs clear to ausculation bilaterally.   Cardiovascular: regular rhythm, normal S1 and S2, no audible murmurs.   GI: soft, non-tender, non-distended, bowel sounds present.  Extremities: No lower extremity edema.  Psychiatric: AAO x 3. Normal affect/mood.

## 2023-12-29 NOTE — PROGRESS NOTE ADULT - SUBJECTIVE AND OBJECTIVE BOX
INTERVAL HPI/OVERNIGHT EVENTS:  Interim reviewed;  Consultants follow up noted;  Patient has no complaint;  States walked with physical therapy yesterday   Glucoses still increased       MEDICATIONS  (STANDING):  amLODIPine   Tablet 10 milliGRAM(s) Oral daily  aspirin  chewable 81 milliGRAM(s) Oral daily  atorvastatin 40 milliGRAM(s) Oral at bedtime  cyanocobalamin 1000 MICROGram(s) Oral daily  dextrose 5%. 1000 milliLiter(s) (50 mL/Hr) IV Continuous <Continuous>  dextrose 5%. 1000 milliLiter(s) (50 mL/Hr) IV Continuous <Continuous>  dextrose 5%. 1000 milliLiter(s) (100 mL/Hr) IV Continuous <Continuous>  dextrose 5%. 1000 milliLiter(s) (100 mL/Hr) IV Continuous <Continuous>  dextrose 50% Injectable 25 Gram(s) IV Push once  dextrose 50% Injectable 12.5 Gram(s) IV Push once  dextrose 50% Injectable 25 Gram(s) IV Push once  dextrose 50% Injectable 25 Gram(s) IV Push once  dextrose 50% Injectable 25 Gram(s) IV Push once  dextrose 50% Injectable 12.5 Gram(s) IV Push once  ertapenem  IVPB 1000 milliGRAM(s) IV Intermittent every 24 hours  folic acid 1 milliGRAM(s) Oral daily  glucagon  Injectable 1 milliGRAM(s) IntraMuscular once  glucagon  Injectable 1 milliGRAM(s) IntraMuscular once  heparin   Injectable 5000 Unit(s) SubCutaneous every 8 hours  hydroxychloroquine 200 milliGRAM(s) Oral two times a day  influenza  Vaccine (HIGH DOSE) 0.7 milliLiter(s) IntraMuscular once  insulin glargine Injectable (LANTUS) 15 Unit(s) SubCutaneous at bedtime  insulin lispro (ADMELOG) corrective regimen sliding scale   SubCutaneous Before meals and at bedtime  insulin lispro Injectable (ADMELOG) 6 Unit(s) SubCutaneous three times a day before meals  predniSONE   Tablet 5 milliGRAM(s) Oral daily  sulfaSALAzine 1000 milliGRAM(s) Oral two times a day    MEDICATIONS  (PRN):  acetaminophen     Tablet .. 650 milliGRAM(s) Oral every 6 hours PRN Temp greater or equal to 38C (100.4F), Mild Pain (1 - 3)  dextrose Oral Gel 15 Gram(s) Oral once PRN Blood Glucose LESS THAN 70 milliGRAM(s)/deciliter  dextrose Oral Gel 15 Gram(s) Oral once PRN Blood Glucose LESS THAN 70 milliGRAM(s)/deciliter      Allergies    citrus (Urticaria)  Bactrim (Rash)    Intolerances        Vital Signs Last 24 Hrs  T(C): 36.4 (29 Dec 2023 06:20), Max: 36.8 (28 Dec 2023 10:48)  T(F): 97.5 (29 Dec 2023 06:20), Max: 98.3 (28 Dec 2023 10:48)  HR: 62 (29 Dec 2023 06:20) (62 - 83)  BP: 153/86 (29 Dec 2023 06:20) (103/67 - 153/86)  BP(mean): --  RR: 18 (29 Dec 2023 06:20) (16 - 18)  SpO2: 99% (29 Dec 2023 06:20) (96% - 99%)    Parameters below as of 29 Dec 2023 06:20  Patient On (Oxygen Delivery Method): room air              Constitutional:  Awake     Eyes: NEDRA    ENMT: Negative    Neck: Supple    Back:  no tenderness     Respiratory:  clear    Cardiovascular: S1 S2    Gastrointestinal:  soft     Genitourinary:    Extremities: no edema    Vascular:    Neurological:    Skin:    Lymph Nodes:            LABS:                        12.2   7.23  )-----------( 186      ( 29 Dec 2023 07:53 )             37.9     12-29    137  |  98  |  28<H>  ----------------------------<  174<H>  4.3   |  26  |  1.28    Ca    9.2      29 Dec 2023 07:53  Phos  2.6     12-29  Mg     1.7     12-29    TPro  6.1  /  Alb  3.2<L>  /  TBili  0.3  /  DBili  x   /  AST  16  /  ALT  10  /  AlkPhos  92  12-29      Urinalysis Basic - ( 29 Dec 2023 07:53 )    Color: x / Appearance: x / SG: x / pH: x  Gluc: 174 mg/dL / Ketone: x  / Bili: x / Urobili: x   Blood: x / Protein: x / Nitrite: x   Leuk Esterase: x / RBC: x / WBC x   Sq Epi: x / Non Sq Epi: x / Bacteria: x        RADIOLOGY & ADDITIONAL TESTS:

## 2023-12-29 NOTE — DISCHARGE NOTE PROVIDER - NSDCMRMEDTOKEN_GEN_ALL_CORE_FT
amLODIPine 10 mg oral tablet: 1 tab(s) orally once a day  calcium carbonate 500 mg (200 mg elemental calcium) oral tablet, chewable: 1 tab(s) orally 2 times a day  Farxiga 10 mg oral tablet: 1 tab(s) orally once a day (at bedtime)  ferrous sulfate 324 mg (65 mg elemental iron) oral delayed release tablet: 1 tab(s) orally once a day  folic acid 1 mg oral tablet: 1 tab(s) orally once a day  hydroxychloroquine 200 mg oral tablet: 1 tab(s) orally 2 times a day  insulin aspart 100 units/mL injectable solution: 5 unit(s) injectable 3 times a day (before meals)  Januvia 100 mg oral tablet: 1 tab(s) orally once a day  Lantus 100 units/mL subcutaneous solution: 17 unit(s) subcutaneous once a day (at bedtime)  Lipitor 40 mg oral tablet: 1 tab(s) orally once a day  losartan 100 mg oral tablet: 1 tab(s) orally once a day  metoprolol succinate 100 mg oral tablet, extended release: 1 tab(s) orally once a day  Multiple Vitamins oral capsule: 1 cap(s) orally once a day  predniSONE 5 mg oral tablet: 1 tab(s) orally once a day Please take through 11/1 (until your appointment with Dr. Douglass)  sulfaSALAzine 500 mg oral tablet: 2 tab(s) orally 2 times a day  Vitamin B12 1000 mcg oral tablet: 1 tab(s) orally once a day  Vitamin C 500 mg oral tablet: 1 tab(s) orally once a day  Vitamin D3 25 mcg (1000 intl units) oral tablet: 1 tab(s) orally once a day   amLODIPine 10 mg oral tablet: 1 tab(s) orally once a day  calcium carbonate 500 mg (200 mg elemental calcium) oral tablet, chewable: 1 tab(s) orally 2 times a day  Farxiga 10 mg oral tablet: 1 tab(s) orally once a day (at bedtime)  ferrous sulfate 324 mg (65 mg elemental iron) oral delayed release tablet: 1 tab(s) orally once a day  folic acid 1 mg oral tablet: 1 tab(s) orally once a day  hydroxychloroquine 200 mg oral tablet: 1 tab(s) orally 2 times a day  insulin aspart 100 units/mL injectable solution: 5 unit(s) injectable 3 times a day (before meals)  Januvia 100 mg oral tablet: 1 tab(s) orally once a day  Lantus 100 units/mL subcutaneous solution: 17 unit(s) subcutaneous once a day (at bedtime)  Lipitor 40 mg oral tablet: 1 tab(s) orally once a day  metoprolol succinate 100 mg oral tablet, extended release: 1 tab(s) orally once a day  Multiple Vitamins oral capsule: 1 cap(s) orally once a day  predniSONE 5 mg oral tablet: 1 tab(s) orally once a day Please take through 11/1 (until your appointment with Dr. Douglass)  sulfaSALAzine 500 mg oral tablet: 2 tab(s) orally 2 times a day  Vitamin B12 1000 mcg oral tablet: 1 tab(s) orally once a day  Vitamin C 500 mg oral tablet: 1 tab(s) orally once a day  Vitamin D3 25 mcg (1000 intl units) oral tablet: 1 tab(s) orally once a day   amLODIPine 10 mg oral tablet: 1 tab(s) orally once a day  calcium carbonate 500 mg (200 mg elemental calcium) oral tablet, chewable: 1 tab(s) orally 2 times a day  Cozaar 100 mg oral tablet: orally once a day  ferrous sulfate 324 mg (65 mg elemental iron) oral delayed release tablet: 1 tab(s) orally once a day  folic acid 1 mg oral tablet: 1 tab(s) orally once a day  hydroxychloroquine 200 mg oral tablet: 1 tab(s) orally 2 times a day  insulin aspart 100 units/mL injectable solution: 5 unit(s) injectable 3 times a day (before meals)  Januvia 100 mg oral tablet: 1 tab(s) orally once a day  Lantus 100 units/mL subcutaneous solution: 17 unit(s) subcutaneous once a day (at bedtime)  Lipitor 40 mg oral tablet: 1 tab(s) orally once a day  Multiple Vitamins oral capsule: 1 cap(s) orally once a day  pioglitazone 15 mg oral tablet: 1 tab(s) orally once a day  predniSONE 5 mg oral tablet: 1 tab(s) orally once a day Please take through 11/1 (until your appointment with Dr. Douglass)  sulfaSALAzine 500 mg oral tablet: 2 tab(s) orally 2 times a day  Vitamin B12 1000 mcg oral tablet: 1 tab(s) orally once a day  Vitamin C 500 mg oral tablet: 1 tab(s) orally once a day  Vitamin D3 25 mcg (1000 intl units) oral tablet: 1 tab(s) orally once a day

## 2023-12-29 NOTE — DISCHARGE NOTE PROVIDER - PROVIDER TOKENS
.[email or web address]   PROVIDER:[TOKEN:[[id number]]] PROVIDER:[TOKEN:[[id number]],SCHEDULEDAPPT:[01/31/2024],SCHEDULEDAPPTTIME:[04:00 PM],ESTABLISHEDPATIENT:[T]]

## 2023-12-29 NOTE — PROGRESS NOTE ADULT - PROBLEM SELECTOR PLAN 3
Reports home . In ED,  Bhb negative. Took prednisone 5mg for the last two days  Home meds lantus 17U, lispro 5U before meals, januvia 100mg. A1c 11.5  s/p 5U humulin in ED  - c/w lantus 15U , moderate iSS with BG monitoring before meals and at bedtime  - f/u Endo recss Reports home . In ED,  Bhb negative. Took prednisone 5mg for the last two days  Home meds lantus 17U, lispro 5U before meals, januvia 100mg. A1c 11.5  s/p 5U humulin in ED  - c/w lantus 15U and 6 units pre meal   - Continue lispro moderate dose sliding scale before meals and at bedtime.  - Patient's fingerstick glucose goal is 100-180 mg/dL.

## 2023-12-29 NOTE — PROGRESS NOTE ADULT - SUBJECTIVE AND OBJECTIVE BOX
OVERNIGHT EVENTS:     SUBJECTIVE:  Patient seen and examined at bedside.  ROS: Patient denies h/n/v/d, fever, chills, cp, palpitations, sob, abd pain, leg swelling, rashes, dysuria, and changes in BM.     Vital Signs Last 12 Hrs  T(F): 97.5 (12-29-23 @ 06:20), Max: 97.5 (12-29-23 @ 06:20)  HR: 62 (12-29-23 @ 06:20) (62 - 62)  BP: 153/86 (12-29-23 @ 06:20) (153/86 - 153/86)  BP(mean): --  RR: 18 (12-29-23 @ 06:20) (18 - 18)  SpO2: 99% (12-29-23 @ 06:20) (99% - 99%)  I&O's Summary      PHYSICAL EXAM:  Constitutional: NAD, comfortable in bed.  HEENT: NC/AT, PERRLA, EOMI, no conjunctival pallor or scleral icterus, MMM  Neck: Supple, no JVD  Respiratory: CTA B/L. No w/r/r.   Cardiovascular: RRR, normal S1 and S2, no m/r/g.   Gastrointestinal: +BS, soft NTND, no guarding or rebound tenderness, no palpable masses   Extremities: wwp; no cyanosis, clubbing or edema.   Vascular: Pulses equal and strong throughout.   Neurological: AAOx3, no CN deficits, strength and sensation intact throughout.   Skin: No gross skin abnormalities or rashes        LABS:                        12.2   7.23  )-----------( 186      ( 29 Dec 2023 07:53 )             37.9     12-29    137  |  98  |  28<H>  ----------------------------<  174<H>  4.3   |  26  |  1.28    Ca    9.2      29 Dec 2023 07:53  Phos  2.6     12-29  Mg     1.7     12-29    TPro  6.1  /  Alb  3.2<L>  /  TBili  0.3  /  DBili  x   /  AST  16  /  ALT  10  /  AlkPhos  92  12-29      Urinalysis Basic - ( 29 Dec 2023 07:53 )    Color: x / Appearance: x / SG: x / pH: x  Gluc: 174 mg/dL / Ketone: x  / Bili: x / Urobili: x   Blood: x / Protein: x / Nitrite: x   Leuk Esterase: x / RBC: x / WBC x   Sq Epi: x / Non Sq Epi: x / Bacteria: x          RADIOLOGY & ADDITIONAL TESTS:    MEDICATIONS  (STANDING):  amLODIPine   Tablet 10 milliGRAM(s) Oral daily  aspirin  chewable 81 milliGRAM(s) Oral daily  atorvastatin 40 milliGRAM(s) Oral at bedtime  cyanocobalamin 1000 MICROGram(s) Oral daily  dextrose 5%. 1000 milliLiter(s) (50 mL/Hr) IV Continuous <Continuous>  dextrose 5%. 1000 milliLiter(s) (100 mL/Hr) IV Continuous <Continuous>  dextrose 5%. 1000 milliLiter(s) (100 mL/Hr) IV Continuous <Continuous>  dextrose 5%. 1000 milliLiter(s) (50 mL/Hr) IV Continuous <Continuous>  dextrose 50% Injectable 25 Gram(s) IV Push once  dextrose 50% Injectable 12.5 Gram(s) IV Push once  dextrose 50% Injectable 25 Gram(s) IV Push once  dextrose 50% Injectable 25 Gram(s) IV Push once  dextrose 50% Injectable 25 Gram(s) IV Push once  dextrose 50% Injectable 12.5 Gram(s) IV Push once  ertapenem  IVPB 1000 milliGRAM(s) IV Intermittent every 24 hours  folic acid 1 milliGRAM(s) Oral daily  glucagon  Injectable 1 milliGRAM(s) IntraMuscular once  glucagon  Injectable 1 milliGRAM(s) IntraMuscular once  heparin   Injectable 5000 Unit(s) SubCutaneous every 8 hours  hydroxychloroquine 200 milliGRAM(s) Oral two times a day  influenza  Vaccine (HIGH DOSE) 0.7 milliLiter(s) IntraMuscular once  insulin glargine Injectable (LANTUS) 15 Unit(s) SubCutaneous at bedtime  insulin lispro (ADMELOG) corrective regimen sliding scale   SubCutaneous Before meals and at bedtime  insulin lispro Injectable (ADMELOG) 6 Unit(s) SubCutaneous three times a day before meals  predniSONE   Tablet 5 milliGRAM(s) Oral daily  sulfaSALAzine 1000 milliGRAM(s) Oral two times a day    MEDICATIONS  (PRN):  acetaminophen     Tablet .. 650 milliGRAM(s) Oral every 6 hours PRN Temp greater or equal to 38C (100.4F), Mild Pain (1 - 3)  dextrose Oral Gel 15 Gram(s) Oral once PRN Blood Glucose LESS THAN 70 milliGRAM(s)/deciliter  dextrose Oral Gel 15 Gram(s) Oral once PRN Blood Glucose LESS THAN 70 milliGRAM(s)/deciliter   OVERNIGHT EVENTS: CYNDY     SUBJECTIVE:  Patient seen and examined at bedside. NAD   ROS: Patient denies h/n/v/d, fever, chills, cp, palpitations, sob, abd pain, leg swelling, rashes, dysuria, and changes in BM.     Vital Signs Last 12 Hrs  T(F): 97.5 (12-29-23 @ 06:20), Max: 97.5 (12-29-23 @ 06:20)  HR: 62 (12-29-23 @ 06:20) (62 - 62)  BP: 153/86 (12-29-23 @ 06:20) (153/86 - 153/86)  BP(mean): --  RR: 18 (12-29-23 @ 06:20) (18 - 18)  SpO2: 99% (12-29-23 @ 06:20) (99% - 99%)  I&O's Summary      PHYSICAL EXAM:  Constitutional: NAD, comfortable in bed.  HEENT: NC/AT, PERRLA, EOMI, no conjunctival pallor or scleral icterus, MMM  Neck: Supple, no JVD  Respiratory: CTA B/L. No w/r/r.   Cardiovascular: RRR, normal S1 and S2, no m/r/g.   Gastrointestinal: +BS, soft NTND, no guarding or rebound tenderness, no palpable masses   Extremities: wwp; no cyanosis, clubbing or edema.   Vascular: Pulses equal and strong throughout.   Neurological: AAOx3, no CN deficits, strength and sensation intact throughout.   Skin: No gross skin abnormalities or rashes        LABS:                        12.2   7.23  )-----------( 186      ( 29 Dec 2023 07:53 )             37.9     12-29    137  |  98  |  28<H>  ----------------------------<  174<H>  4.3   |  26  |  1.28    Ca    9.2      29 Dec 2023 07:53  Phos  2.6     12-29  Mg     1.7     12-29    TPro  6.1  /  Alb  3.2<L>  /  TBili  0.3  /  DBili  x   /  AST  16  /  ALT  10  /  AlkPhos  92  12-29      Urinalysis Basic - ( 29 Dec 2023 07:53 )    Color: x / Appearance: x / SG: x / pH: x  Gluc: 174 mg/dL / Ketone: x  / Bili: x / Urobili: x   Blood: x / Protein: x / Nitrite: x   Leuk Esterase: x / RBC: x / WBC x   Sq Epi: x / Non Sq Epi: x / Bacteria: x          RADIOLOGY & ADDITIONAL TESTS:    MEDICATIONS  (STANDING):  amLODIPine   Tablet 10 milliGRAM(s) Oral daily  aspirin  chewable 81 milliGRAM(s) Oral daily  atorvastatin 40 milliGRAM(s) Oral at bedtime  cyanocobalamin 1000 MICROGram(s) Oral daily  dextrose 5%. 1000 milliLiter(s) (50 mL/Hr) IV Continuous <Continuous>  dextrose 5%. 1000 milliLiter(s) (100 mL/Hr) IV Continuous <Continuous>  dextrose 5%. 1000 milliLiter(s) (100 mL/Hr) IV Continuous <Continuous>  dextrose 5%. 1000 milliLiter(s) (50 mL/Hr) IV Continuous <Continuous>  dextrose 50% Injectable 25 Gram(s) IV Push once  dextrose 50% Injectable 12.5 Gram(s) IV Push once  dextrose 50% Injectable 25 Gram(s) IV Push once  dextrose 50% Injectable 25 Gram(s) IV Push once  dextrose 50% Injectable 25 Gram(s) IV Push once  dextrose 50% Injectable 12.5 Gram(s) IV Push once  ertapenem  IVPB 1000 milliGRAM(s) IV Intermittent every 24 hours  folic acid 1 milliGRAM(s) Oral daily  glucagon  Injectable 1 milliGRAM(s) IntraMuscular once  glucagon  Injectable 1 milliGRAM(s) IntraMuscular once  heparin   Injectable 5000 Unit(s) SubCutaneous every 8 hours  hydroxychloroquine 200 milliGRAM(s) Oral two times a day  influenza  Vaccine (HIGH DOSE) 0.7 milliLiter(s) IntraMuscular once  insulin glargine Injectable (LANTUS) 15 Unit(s) SubCutaneous at bedtime  insulin lispro (ADMELOG) corrective regimen sliding scale   SubCutaneous Before meals and at bedtime  insulin lispro Injectable (ADMELOG) 6 Unit(s) SubCutaneous three times a day before meals  predniSONE   Tablet 5 milliGRAM(s) Oral daily  sulfaSALAzine 1000 milliGRAM(s) Oral two times a day    MEDICATIONS  (PRN):  acetaminophen     Tablet .. 650 milliGRAM(s) Oral every 6 hours PRN Temp greater or equal to 38C (100.4F), Mild Pain (1 - 3)  dextrose Oral Gel 15 Gram(s) Oral once PRN Blood Glucose LESS THAN 70 milliGRAM(s)/deciliter  dextrose Oral Gel 15 Gram(s) Oral once PRN Blood Glucose LESS THAN 70 milliGRAM(s)/deciliter

## 2023-12-29 NOTE — PROGRESS NOTE ADULT - SUBJECTIVE AND OBJECTIVE BOX
SUBJECTIVE / INTERVAL HPI: Patient was seen and examined this morning.     Overnight events:    12/28: lantus 15 + lispro 6 with meals    CAPILLARY BLOOD GLUCOSE & INSULIN RECEIVED  244 mg/dL (12-27 @ 21:55)  186 mg/dL (12-28 @ 09:19)  386 mg/dL (12-28 @ 13:01) 10  286 mg/dL (12-28 @ 18:08) 6  283 mg/dL (12-28 @ 21:54) - 15 + 2      REVIEW OF SYSTEMS  Constitutional:  Negative fever, chills or loss of appetite.  Eyes:  Negative blurry vision or double vision.  Cardiovascular:  Negative for chest pain or palpitations.  Respiratory:  Negative for cough, wheezing, or shortness of breath.    Gastrointestinal:  Negative for nausea, vomiting, diarrhea, constipation, or abdominal pain.  Genitourinary:  Negative frequency, urgency or dysuria.  Neurologic:  No headache, confusion, dizziness, lightheadedness.    PHYSICAL EXAM  Vital Signs Last 24 Hrs  T(C): 36.4 (29 Dec 2023 06:20), Max: 36.8 (28 Dec 2023 10:48)  T(F): 97.5 (29 Dec 2023 06:20), Max: 98.3 (28 Dec 2023 10:48)  HR: 62 (29 Dec 2023 06:20) (62 - 83)  BP: 153/86 (29 Dec 2023 06:20) (103/67 - 153/86)  BP(mean): --  RR: 18 (29 Dec 2023 06:20) (16 - 18)  SpO2: 99% (29 Dec 2023 06:20) (96% - 99%)    Parameters below as of 29 Dec 2023 06:20  Patient On (Oxygen Delivery Method): room air        Constitutional: Awake, alert, in no acute distress.   HEENT: Normocephalic, atraumatic, NEDRA.  Respiratory: Lungs clear to ausculation bilaterally.   Cardiovascular: regular rhythm, normal S1 and S2, no audible murmurs.   GI: soft, non-tender, non-distended, bowel sounds present.  Extremities: No lower extremity edema.  Psychiatric: AAO x 3. Normal affect/mood.     LABS  CBC - WBC/HGB/HTC/PLT: 7.23/12.2/37.9/186 (12-29-23)  BMP - Na/K/Cl/Bicarb/BUN/Cr/Gluc/AG/eGFR: 135/4.1/99/29/27/1.23/164/7/46 (12-28-23)  Ca - 9.1 (12-28-23)  Phos - 1.9 (12-28-23)  Mg - 1.8 (12-28-23)  LFT - Alb/Tprot/Tbili/Dbili/AlkPhos/ALT/AST: 3.0/--/0.3/--/85/12/14 (12-28-23)    Thyroid Stimulating Hormone, Serum: 0.473 (11-16-23)          MEDICATIONS  MEDICATIONS  (STANDING):  amLODIPine   Tablet 10 milliGRAM(s) Oral daily  aspirin  chewable 81 milliGRAM(s) Oral daily  atorvastatin 40 milliGRAM(s) Oral at bedtime  cyanocobalamin 1000 MICROGram(s) Oral daily  dextrose 5%. 1000 milliLiter(s) (50 mL/Hr) IV Continuous <Continuous>  dextrose 5%. 1000 milliLiter(s) (100 mL/Hr) IV Continuous <Continuous>  dextrose 5%. 1000 milliLiter(s) (100 mL/Hr) IV Continuous <Continuous>  dextrose 5%. 1000 milliLiter(s) (50 mL/Hr) IV Continuous <Continuous>  dextrose 50% Injectable 25 Gram(s) IV Push once  dextrose 50% Injectable 12.5 Gram(s) IV Push once  dextrose 50% Injectable 25 Gram(s) IV Push once  dextrose 50% Injectable 25 Gram(s) IV Push once  dextrose 50% Injectable 12.5 Gram(s) IV Push once  dextrose 50% Injectable 25 Gram(s) IV Push once  ertapenem  IVPB 1000 milliGRAM(s) IV Intermittent every 24 hours  folic acid 1 milliGRAM(s) Oral daily  glucagon  Injectable 1 milliGRAM(s) IntraMuscular once  glucagon  Injectable 1 milliGRAM(s) IntraMuscular once  heparin   Injectable 5000 Unit(s) SubCutaneous every 8 hours  hydroxychloroquine 200 milliGRAM(s) Oral two times a day  influenza  Vaccine (HIGH DOSE) 0.7 milliLiter(s) IntraMuscular once  insulin glargine Injectable (LANTUS) 15 Unit(s) SubCutaneous at bedtime  insulin lispro (ADMELOG) corrective regimen sliding scale   SubCutaneous Before meals and at bedtime  insulin lispro Injectable (ADMELOG) 6 Unit(s) SubCutaneous three times a day before meals  predniSONE   Tablet 5 milliGRAM(s) Oral daily  sulfaSALAzine 1000 milliGRAM(s) Oral two times a day    MEDICATIONS  (PRN):  acetaminophen     Tablet .. 650 milliGRAM(s) Oral every 6 hours PRN Temp greater or equal to 38C (100.4F), Mild Pain (1 - 3)  dextrose Oral Gel 15 Gram(s) Oral once PRN Blood Glucose LESS THAN 70 milliGRAM(s)/deciliter  dextrose Oral Gel 15 Gram(s) Oral once PRN Blood Glucose LESS THAN 70 milliGRAM(s)/deciliter    ASSESSMENT / RECOMMENDATIONS    MICHAEL READ is a 73y Female with a past medical history of NSCLC adenocarcinoma w mets to brain (s/p RT x1 07/2023, s/p chemo x2 (last tx 11/16/23),R hip replacement, RA, CKD IIIA, HTN and HLD presented to [facility] on 12/26 with persist dysuria and polyuria.  She was also seen in [facility] ED 12/14 for hyperglycemia 500s and UTI discharged cefpodoxime x 14 days.  Urine culture grew klebsiella oxytoca/raoutella ornithinolytica.  PT admitted for UTI, treated with IV abx.  Pt also reported frequent falls to which CT head was negative for hemorrhage.    On presentation, labs revealed Na 132, glucose 403, BUN 30, Cr 1.23, CO2 25, anion gap 11, , negative beta-hydroxybutyrate.  Pt found to hbe influenza +.  Endocrinology has been consulted for Diabetes Management.    A1C: 11.5 %  BUN: 27  Creatinine: 1.23  GFR: 46  Weight: 62.1  BMI: 24.3    # Type 2 diabetes mellitus with hyperglycemia  - pt will remain on prednisone 5  - Please keep lantus   units at bedtime.   - Keep lispro   units before each meal.  - Continue lispro moderate dose sliding scale before meals and at bedtime.  - Patient's fingerstick glucose goal is 100-180 mg/dL.    - Discharge recommendations to be discussed.   - Patient can follow up at discharge with [facility] by calling [phone] to make an appointment.        Case discussed with Dr. Blackwood. Primary team updated.       Alix Harmon  Endocrinology Fellow    Service Pager: [phone]    SUBJECTIVE / INTERVAL HPI: Patient was seen and examined this morning.     Overnight events:    12/28: lantus 15 + lispro 6 with meals    CAPILLARY BLOOD GLUCOSE & INSULIN RECEIVED  244 mg/dL (12-27 @ 21:55)  186 mg/dL (12-28 @ 09:19)  386 mg/dL (12-28 @ 13:01) 10  286 mg/dL (12-28 @ 18:08) 6  283 mg/dL (12-28 @ 21:54) - 15 + 2      REVIEW OF SYSTEMS  Constitutional:  Negative fever, chills or loss of appetite.  Eyes:  Negative blurry vision or double vision.  Cardiovascular:  Negative for chest pain or palpitations.  Respiratory:  Negative for cough, wheezing, or shortness of breath.    Gastrointestinal:  Negative for nausea, vomiting, diarrhea, constipation, or abdominal pain.  Genitourinary:  Negative frequency, urgency or dysuria.  Neurologic:  No headache, confusion, dizziness, lightheadedness.    PHYSICAL EXAM  Vital Signs Last 24 Hrs  T(C): 36.4 (29 Dec 2023 06:20), Max: 36.8 (28 Dec 2023 10:48)  T(F): 97.5 (29 Dec 2023 06:20), Max: 98.3 (28 Dec 2023 10:48)  HR: 62 (29 Dec 2023 06:20) (62 - 83)  BP: 153/86 (29 Dec 2023 06:20) (103/67 - 153/86)  BP(mean): --  RR: 18 (29 Dec 2023 06:20) (16 - 18)  SpO2: 99% (29 Dec 2023 06:20) (96% - 99%)    Parameters below as of 29 Dec 2023 06:20  Patient On (Oxygen Delivery Method): room air        Constitutional: Awake, alert, in no acute distress.   HEENT: Normocephalic, atraumatic, NEDRA.  Respiratory: Lungs clear to ausculation bilaterally.   Cardiovascular: regular rhythm, normal S1 and S2, no audible murmurs.   GI: soft, non-tender, non-distended, bowel sounds present.  Extremities: No lower extremity edema.  Psychiatric: AAO x 3. Normal affect/mood.     LABS  CBC - WBC/HGB/HTC/PLT: 7.23/12.2/37.9/186 (12-29-23)  BMP - Na/K/Cl/Bicarb/BUN/Cr/Gluc/AG/eGFR: 135/4.1/99/29/27/1.23/164/7/46 (12-28-23)  Ca - 9.1 (12-28-23)  Phos - 1.9 (12-28-23)  Mg - 1.8 (12-28-23)  LFT - Alb/Tprot/Tbili/Dbili/AlkPhos/ALT/AST: 3.0/--/0.3/--/85/12/14 (12-28-23)    Thyroid Stimulating Hormone, Serum: 0.473 (11-16-23)          MEDICATIONS  MEDICATIONS  (STANDING):  amLODIPine   Tablet 10 milliGRAM(s) Oral daily  aspirin  chewable 81 milliGRAM(s) Oral daily  atorvastatin 40 milliGRAM(s) Oral at bedtime  cyanocobalamin 1000 MICROGram(s) Oral daily  dextrose 5%. 1000 milliLiter(s) (50 mL/Hr) IV Continuous <Continuous>  dextrose 5%. 1000 milliLiter(s) (100 mL/Hr) IV Continuous <Continuous>  dextrose 5%. 1000 milliLiter(s) (100 mL/Hr) IV Continuous <Continuous>  dextrose 5%. 1000 milliLiter(s) (50 mL/Hr) IV Continuous <Continuous>  dextrose 50% Injectable 25 Gram(s) IV Push once  dextrose 50% Injectable 12.5 Gram(s) IV Push once  dextrose 50% Injectable 25 Gram(s) IV Push once  dextrose 50% Injectable 25 Gram(s) IV Push once  dextrose 50% Injectable 12.5 Gram(s) IV Push once  dextrose 50% Injectable 25 Gram(s) IV Push once  ertapenem  IVPB 1000 milliGRAM(s) IV Intermittent every 24 hours  folic acid 1 milliGRAM(s) Oral daily  glucagon  Injectable 1 milliGRAM(s) IntraMuscular once  glucagon  Injectable 1 milliGRAM(s) IntraMuscular once  heparin   Injectable 5000 Unit(s) SubCutaneous every 8 hours  hydroxychloroquine 200 milliGRAM(s) Oral two times a day  influenza  Vaccine (HIGH DOSE) 0.7 milliLiter(s) IntraMuscular once  insulin glargine Injectable (LANTUS) 15 Unit(s) SubCutaneous at bedtime  insulin lispro (ADMELOG) corrective regimen sliding scale   SubCutaneous Before meals and at bedtime  insulin lispro Injectable (ADMELOG) 6 Unit(s) SubCutaneous three times a day before meals  predniSONE   Tablet 5 milliGRAM(s) Oral daily  sulfaSALAzine 1000 milliGRAM(s) Oral two times a day    MEDICATIONS  (PRN):  acetaminophen     Tablet .. 650 milliGRAM(s) Oral every 6 hours PRN Temp greater or equal to 38C (100.4F), Mild Pain (1 - 3)  dextrose Oral Gel 15 Gram(s) Oral once PRN Blood Glucose LESS THAN 70 milliGRAM(s)/deciliter  dextrose Oral Gel 15 Gram(s) Oral once PRN Blood Glucose LESS THAN 70 milliGRAM(s)/deciliter    ASSESSMENT / RECOMMENDATIONS    MICHAEL READ is a 73y Female with a past medical history of NSCLC adenocarcinoma w mets to brain (s/p RT x1 07/2023, s/p chemo x2 (last tx 11/16/23),R hip replacement, RA, CKD IIIA, HTN and HLD presented to [facility] on 12/26 with persist dysuria and polyuria.  She was also seen in [facility] ED 12/14 for hyperglycemia 500s and UTI discharged cefpodoxime x 14 days.  Urine culture grew klebsiella oxytoca/raoutella ornithinolytica.  PT admitted for UTI, treated with IV abx.  Pt also reported frequent falls to which CT head was negative for hemorrhage.    On presentation, labs revealed Na 132, glucose 403, BUN 30, Cr 1.23, CO2 25, anion gap 11, , negative beta-hydroxybutyrate.  Pt found to hbe influenza +.  Endocrinology has been consulted for Diabetes Management.    A1C: 11.5 %  BUN: 27  Creatinine: 1.23  GFR: 46  Weight: 62.1  BMI: 24.3    # Type 2 diabetes mellitus with hyperglycemia  - pt will remain on prednisone 5  - Please keep lantus   units at bedtime.   - Keep lispro   units before each meal.  - Continue lispro moderate dose sliding scale before meals and at bedtime.  - Patient's fingerstick glucose goal is 100-180 mg/dL.    - Discharge recommendations to be discussed.   - Patient can follow up at discharge with [facility] by calling [phone] to make an appointment.        Case discussed with Dr. Blackwood. Primary team updated.       Alix Harmon  Endocrinology Fellow    Service Pager: [phone]    SUBJECTIVE / INTERVAL HPI: Patient was seen and examined this morning.     Overnight events:    12/28: lantus 15 + lispro 6 with meals    CAPILLARY BLOOD GLUCOSE & INSULIN RECEIVED  244 mg/dL (12-27 @ 21:55)  186 mg/dL (12-28 @ 09:19)  386 mg/dL (12-28 @ 13:01) 10  286 mg/dL (12-28 @ 18:08) 6  283 mg/dL (12-28 @ 21:54) - 15 + 2  331 - 6 + 8    REVIEW OF SYSTEMS  Constitutional:  Negative fever, chills or loss of appetite.  Eyes:  Negative blurry vision or double vision.  Cardiovascular:  Negative for chest pain or palpitations.  Respiratory:  Negative for cough, wheezing, or shortness of breath.    Gastrointestinal:  Negative for nausea, vomiting, diarrhea, constipation, or abdominal pain.  Genitourinary:  Negative frequency, urgency or dysuria.  Neurologic:  No headache, confusion, dizziness, lightheadedness.    PHYSICAL EXAM  Vital Signs Last 24 Hrs  T(C): 36.4 (29 Dec 2023 06:20), Max: 36.8 (28 Dec 2023 10:48)  T(F): 97.5 (29 Dec 2023 06:20), Max: 98.3 (28 Dec 2023 10:48)  HR: 62 (29 Dec 2023 06:20) (62 - 83)  BP: 153/86 (29 Dec 2023 06:20) (103/67 - 153/86)  BP(mean): --  RR: 18 (29 Dec 2023 06:20) (16 - 18)  SpO2: 99% (29 Dec 2023 06:20) (96% - 99%)    Parameters below as of 29 Dec 2023 06:20  Patient On (Oxygen Delivery Method): room air        Constitutional: Awake, alert, in no acute distress.   HEENT: Normocephalic, atraumatic, NEDRA.  Respiratory: Lungs clear to ausculation bilaterally.   Cardiovascular: regular rhythm, normal S1 and S2, no audible murmurs.   GI: soft, non-tender, non-distended, bowel sounds present.  Extremities: No lower extremity edema.  Psychiatric: AAO x 3. Normal affect/mood.     LABS  CBC - WBC/HGB/HTC/PLT: 7.23/12.2/37.9/186 (12-29-23)  BMP - Na/K/Cl/Bicarb/BUN/Cr/Gluc/AG/eGFR: 135/4.1/99/29/27/1.23/164/7/46 (12-28-23)  Ca - 9.1 (12-28-23)  Phos - 1.9 (12-28-23)  Mg - 1.8 (12-28-23)  LFT - Alb/Tprot/Tbili/Dbili/AlkPhos/ALT/AST: 3.0/--/0.3/--/85/12/14 (12-28-23)    Thyroid Stimulating Hormone, Serum: 0.473 (11-16-23)          MEDICATIONS  MEDICATIONS  (STANDING):  amLODIPine   Tablet 10 milliGRAM(s) Oral daily  aspirin  chewable 81 milliGRAM(s) Oral daily  atorvastatin 40 milliGRAM(s) Oral at bedtime  cyanocobalamin 1000 MICROGram(s) Oral daily  dextrose 5%. 1000 milliLiter(s) (50 mL/Hr) IV Continuous <Continuous>  dextrose 5%. 1000 milliLiter(s) (100 mL/Hr) IV Continuous <Continuous>  dextrose 5%. 1000 milliLiter(s) (100 mL/Hr) IV Continuous <Continuous>  dextrose 5%. 1000 milliLiter(s) (50 mL/Hr) IV Continuous <Continuous>  dextrose 50% Injectable 25 Gram(s) IV Push once  dextrose 50% Injectable 12.5 Gram(s) IV Push once  dextrose 50% Injectable 25 Gram(s) IV Push once  dextrose 50% Injectable 25 Gram(s) IV Push once  dextrose 50% Injectable 12.5 Gram(s) IV Push once  dextrose 50% Injectable 25 Gram(s) IV Push once  ertapenem  IVPB 1000 milliGRAM(s) IV Intermittent every 24 hours  folic acid 1 milliGRAM(s) Oral daily  glucagon  Injectable 1 milliGRAM(s) IntraMuscular once  glucagon  Injectable 1 milliGRAM(s) IntraMuscular once  heparin   Injectable 5000 Unit(s) SubCutaneous every 8 hours  hydroxychloroquine 200 milliGRAM(s) Oral two times a day  influenza  Vaccine (HIGH DOSE) 0.7 milliLiter(s) IntraMuscular once  insulin glargine Injectable (LANTUS) 15 Unit(s) SubCutaneous at bedtime  insulin lispro (ADMELOG) corrective regimen sliding scale   SubCutaneous Before meals and at bedtime  insulin lispro Injectable (ADMELOG) 6 Unit(s) SubCutaneous three times a day before meals  predniSONE   Tablet 5 milliGRAM(s) Oral daily  sulfaSALAzine 1000 milliGRAM(s) Oral two times a day    MEDICATIONS  (PRN):  acetaminophen     Tablet .. 650 milliGRAM(s) Oral every 6 hours PRN Temp greater or equal to 38C (100.4F), Mild Pain (1 - 3)  dextrose Oral Gel 15 Gram(s) Oral once PRN Blood Glucose LESS THAN 70 milliGRAM(s)/deciliter  dextrose Oral Gel 15 Gram(s) Oral once PRN Blood Glucose LESS THAN 70 milliGRAM(s)/deciliter    ASSESSMENT / RECOMMENDATIONS    MICHAEL READ is a 73y Female with a past medical history of NSCLC adenocarcinoma w mets to brain (s/p RT x1 07/2023, s/p chemo x2 (last tx 11/16/23),R hip replacement, RA, CKD IIIA, HTN and HLD presented to [facility] on 12/26 with persist dysuria and polyuria.  She was also seen in [facility] ED 12/14 for hyperglycemia 500s and UTI discharged cefpodoxime x 14 days.  Urine culture grew klebsiella oxytoca/raoutella ornithinolytica.  PT admitted for UTI, treated with IV abx.  Pt also reported frequent falls to which CT head was negative for hemorrhage.    On presentation, labs revealed Na 132, glucose 403, BUN 30, Cr 1.23, CO2 25, anion gap 11, , negative beta-hydroxybutyrate.  Pt found to hbe influenza +.  Endocrinology has been consulted for Diabetes Management.    A1C: 11.5 %  BUN: 27  Creatinine: 1.23  GFR: 46  Weight: 62.1  BMI: 24.3    # Type 2 diabetes mellitus with hyperglycemia  - pt will remain on prednisone 5  - Please keep lantus   units at bedtime.   - Keep lispro   units before each meal.  - Continue lispro moderate dose sliding scale before meals and at bedtime.  - Patient's fingerstick glucose goal is 100-180 mg/dL.    - Discharge recommendations to be discussed.   - Patient can follow up at discharge with [facility] by calling [phone] to make an appointment.        Case discussed with Dr. Blackwood. Primary team updated.       Alix Harmon  Endocrinology Fellow    Service Pager: [phone]    SUBJECTIVE / INTERVAL HPI: Patient was seen and examined this morning.     Overnight events:    12/28: lantus 15 + lispro 6 with meals    CAPILLARY BLOOD GLUCOSE & INSULIN RECEIVED  244 mg/dL (12-27 @ 21:55)  186 mg/dL (12-28 @ 09:19)  386 mg/dL (12-28 @ 13:01) 10  286 mg/dL (12-28 @ 18:08) 6  283 mg/dL (12-28 @ 21:54) - 15 + 2  331 - 6 + 8    REVIEW OF SYSTEMS  Constitutional:  Negative fever, chills or loss of appetite.  Eyes:  Negative blurry vision or double vision.  Cardiovascular:  Negative for chest pain or palpitations.  Respiratory:  Negative for cough, wheezing, or shortness of breath.    Gastrointestinal:  Negative for nausea, vomiting, diarrhea, constipation, or abdominal pain.  Genitourinary:  Negative frequency, urgency or dysuria.  Neurologic:  No headache, confusion, dizziness, lightheadedness.    PHYSICAL EXAM  Vital Signs Last 24 Hrs  T(C): 36.4 (29 Dec 2023 06:20), Max: 36.8 (28 Dec 2023 10:48)  T(F): 97.5 (29 Dec 2023 06:20), Max: 98.3 (28 Dec 2023 10:48)  HR: 62 (29 Dec 2023 06:20) (62 - 83)  BP: 153/86 (29 Dec 2023 06:20) (103/67 - 153/86)  BP(mean): --  RR: 18 (29 Dec 2023 06:20) (16 - 18)  SpO2: 99% (29 Dec 2023 06:20) (96% - 99%)    Parameters below as of 29 Dec 2023 06:20  Patient On (Oxygen Delivery Method): room air        Constitutional: Awake, alert, in no acute distress.   HEENT: Normocephalic, atraumatic, NEDRA.  Respiratory: Lungs clear to ausculation bilaterally.   Cardiovascular: regular rhythm, normal S1 and S2, no audible murmurs.   GI: soft, non-tender, non-distended, bowel sounds present.  Extremities: No lower extremity edema.  Psychiatric: AAO x 3. Normal affect/mood.     LABS  CBC - WBC/HGB/HTC/PLT: 7.23/12.2/37.9/186 (12-29-23)  BMP - Na/K/Cl/Bicarb/BUN/Cr/Gluc/AG/eGFR: 135/4.1/99/29/27/1.23/164/7/46 (12-28-23)  Ca - 9.1 (12-28-23)  Phos - 1.9 (12-28-23)  Mg - 1.8 (12-28-23)  LFT - Alb/Tprot/Tbili/Dbili/AlkPhos/ALT/AST: 3.0/--/0.3/--/85/12/14 (12-28-23)    Thyroid Stimulating Hormone, Serum: 0.473 (11-16-23)          MEDICATIONS  MEDICATIONS  (STANDING):  amLODIPine   Tablet 10 milliGRAM(s) Oral daily  aspirin  chewable 81 milliGRAM(s) Oral daily  atorvastatin 40 milliGRAM(s) Oral at bedtime  cyanocobalamin 1000 MICROGram(s) Oral daily  dextrose 5%. 1000 milliLiter(s) (50 mL/Hr) IV Continuous <Continuous>  dextrose 5%. 1000 milliLiter(s) (100 mL/Hr) IV Continuous <Continuous>  dextrose 5%. 1000 milliLiter(s) (100 mL/Hr) IV Continuous <Continuous>  dextrose 5%. 1000 milliLiter(s) (50 mL/Hr) IV Continuous <Continuous>  dextrose 50% Injectable 25 Gram(s) IV Push once  dextrose 50% Injectable 12.5 Gram(s) IV Push once  dextrose 50% Injectable 25 Gram(s) IV Push once  dextrose 50% Injectable 25 Gram(s) IV Push once  dextrose 50% Injectable 12.5 Gram(s) IV Push once  dextrose 50% Injectable 25 Gram(s) IV Push once  ertapenem  IVPB 1000 milliGRAM(s) IV Intermittent every 24 hours  folic acid 1 milliGRAM(s) Oral daily  glucagon  Injectable 1 milliGRAM(s) IntraMuscular once  glucagon  Injectable 1 milliGRAM(s) IntraMuscular once  heparin   Injectable 5000 Unit(s) SubCutaneous every 8 hours  hydroxychloroquine 200 milliGRAM(s) Oral two times a day  influenza  Vaccine (HIGH DOSE) 0.7 milliLiter(s) IntraMuscular once  insulin glargine Injectable (LANTUS) 15 Unit(s) SubCutaneous at bedtime  insulin lispro (ADMELOG) corrective regimen sliding scale   SubCutaneous Before meals and at bedtime  insulin lispro Injectable (ADMELOG) 6 Unit(s) SubCutaneous three times a day before meals  predniSONE   Tablet 5 milliGRAM(s) Oral daily  sulfaSALAzine 1000 milliGRAM(s) Oral two times a day    MEDICATIONS  (PRN):  acetaminophen     Tablet .. 650 milliGRAM(s) Oral every 6 hours PRN Temp greater or equal to 38C (100.4F), Mild Pain (1 - 3)  dextrose Oral Gel 15 Gram(s) Oral once PRN Blood Glucose LESS THAN 70 milliGRAM(s)/deciliter  dextrose Oral Gel 15 Gram(s) Oral once PRN Blood Glucose LESS THAN 70 milliGRAM(s)/deciliter    ASSESSMENT / RECOMMENDATIONS    MICHAEL READ is a 73y Female with a past medical history of NSCLC adenocarcinoma w mets to brain (s/p RT x1 07/2023, s/p chemo x2 (last tx 11/16/23),R hip replacement, RA, CKD IIIA, HTN and HLD presented to [facility] on 12/26 with persist dysuria and polyuria.  She was also seen in [facility] ED 12/14 for hyperglycemia 500s and UTI discharged cefpodoxime x 14 days.  Urine culture grew klebsiella oxytoca/raoutella ornithinolytica.  PT admitted for UTI, treated with IV abx.  Pt also reported frequent falls to which CT head was negative for hemorrhage.    On presentation, labs revealed Na 132, glucose 403, BUN 30, Cr 1.23, CO2 25, anion gap 11, , negative beta-hydroxybutyrate.  Pt found to hbe influenza +.  Endocrinology has been consulted for Diabetes Management.    A1C: 11.5 %  BUN: 27  Creatinine: 1.23  GFR: 46  Weight: 62.1  BMI: 24.3    # Type 2 diabetes mellitus with hyperglycemia  - pt will remain on prednisone 5  - Please keep lantus   units at bedtime.   - Keep lispro   units before each meal.  - Continue lispro moderate dose sliding scale before meals and at bedtime.  - Patient's fingerstick glucose goal is 100-180 mg/dL.    - Discharge recommendations to be discussed.   - Patient can follow up at discharge with [facility] by calling [phone] to make an appointment.        Case discussed with Dr. Blackwood. Primary team updated.       Alix Harmon  Endocrinology Fellow    Service Pager: [phone]    SUBJECTIVE / INTERVAL HPI: Patient was seen and examined this morning.     Overnight events:  afebrile and hemodynamically stable  pt continued to cough  pt said her cousin brought her popeyes chicken and 2 biscuits for dinner last night  states she does nto remember if she had dessert, she said maybe she did  she had oatmeal, eggs, turkey wyatt and coffee for the corey    12/28: lantus 15 + lispro 6 with meals    CAPILLARY BLOOD GLUCOSE & INSULIN RECEIVED  244 mg/dL (12-27 @ 21:55)  186 mg/dL (12-28 @ 09:19)  386 mg/dL (12-28 @ 13:01) 10  286 mg/dL (12-28 @ 18:08) 6  283 mg/dL (12-28 @ 21:54) - 15 + 2  331 - 6 + 8    REVIEW OF SYSTEMS  Constitutional:  Negative fever, chills or loss of appetite.  Eyes:  Negative blurry vision or double vision.  Cardiovascular:  Negative for chest pain or palpitations.  Respiratory:  Negative for cough, wheezing, or shortness of breath.    Gastrointestinal:  Negative for nausea, vomiting, diarrhea, constipation, or abdominal pain.  Genitourinary:  Negative frequency, urgency or dysuria.  Neurologic:  No headache, confusion, dizziness, lightheadedness.    PHYSICAL EXAM  Vital Signs Last 24 Hrs  T(C): 36.4 (29 Dec 2023 06:20), Max: 36.8 (28 Dec 2023 10:48)  T(F): 97.5 (29 Dec 2023 06:20), Max: 98.3 (28 Dec 2023 10:48)  HR: 62 (29 Dec 2023 06:20) (62 - 83)  BP: 153/86 (29 Dec 2023 06:20) (103/67 - 153/86)  BP(mean): --  RR: 18 (29 Dec 2023 06:20) (16 - 18)  SpO2: 99% (29 Dec 2023 06:20) (96% - 99%)    Parameters below as of 29 Dec 2023 06:20  Patient On (Oxygen Delivery Method): room air        Constitutional: Awake, alert, in no acute distress.   HEENT: Normocephalic, atraumatic, NEDRA.  Respiratory: Lungs clear to ausculation bilaterally.   Cardiovascular: regular rhythm, normal S1 and S2, no audible murmurs.   GI: soft, non-tender, non-distended, bowel sounds present.  Extremities: No lower extremity edema.  Psychiatric: AAO x 3. Normal affect/mood.     LABS  CBC - WBC/HGB/HTC/PLT: 7.23/12.2/37.9/186 (12-29-23)  BMP - Na/K/Cl/Bicarb/BUN/Cr/Gluc/AG/eGFR: 135/4.1/99/29/27/1.23/164/7/46 (12-28-23)  Ca - 9.1 (12-28-23)  Phos - 1.9 (12-28-23)  Mg - 1.8 (12-28-23)  LFT - Alb/Tprot/Tbili/Dbili/AlkPhos/ALT/AST: 3.0/--/0.3/--/85/12/14 (12-28-23)    Thyroid Stimulating Hormone, Serum: 0.473 (11-16-23)          MEDICATIONS  MEDICATIONS  (STANDING):  amLODIPine   Tablet 10 milliGRAM(s) Oral daily  aspirin  chewable 81 milliGRAM(s) Oral daily  atorvastatin 40 milliGRAM(s) Oral at bedtime  cyanocobalamin 1000 MICROGram(s) Oral daily  dextrose 5%. 1000 milliLiter(s) (50 mL/Hr) IV Continuous <Continuous>  dextrose 5%. 1000 milliLiter(s) (100 mL/Hr) IV Continuous <Continuous>  dextrose 5%. 1000 milliLiter(s) (100 mL/Hr) IV Continuous <Continuous>  dextrose 5%. 1000 milliLiter(s) (50 mL/Hr) IV Continuous <Continuous>  dextrose 50% Injectable 25 Gram(s) IV Push once  dextrose 50% Injectable 12.5 Gram(s) IV Push once  dextrose 50% Injectable 25 Gram(s) IV Push once  dextrose 50% Injectable 25 Gram(s) IV Push once  dextrose 50% Injectable 12.5 Gram(s) IV Push once  dextrose 50% Injectable 25 Gram(s) IV Push once  ertapenem  IVPB 1000 milliGRAM(s) IV Intermittent every 24 hours  folic acid 1 milliGRAM(s) Oral daily  glucagon  Injectable 1 milliGRAM(s) IntraMuscular once  glucagon  Injectable 1 milliGRAM(s) IntraMuscular once  heparin   Injectable 5000 Unit(s) SubCutaneous every 8 hours  hydroxychloroquine 200 milliGRAM(s) Oral two times a day  influenza  Vaccine (HIGH DOSE) 0.7 milliLiter(s) IntraMuscular once  insulin glargine Injectable (LANTUS) 15 Unit(s) SubCutaneous at bedtime  insulin lispro (ADMELOG) corrective regimen sliding scale   SubCutaneous Before meals and at bedtime  insulin lispro Injectable (ADMELOG) 6 Unit(s) SubCutaneous three times a day before meals  predniSONE   Tablet 5 milliGRAM(s) Oral daily  sulfaSALAzine 1000 milliGRAM(s) Oral two times a day    MEDICATIONS  (PRN):  acetaminophen     Tablet .. 650 milliGRAM(s) Oral every 6 hours PRN Temp greater or equal to 38C (100.4F), Mild Pain (1 - 3)  dextrose Oral Gel 15 Gram(s) Oral once PRN Blood Glucose LESS THAN 70 milliGRAM(s)/deciliter  dextrose Oral Gel 15 Gram(s) Oral once PRN Blood Glucose LESS THAN 70 milliGRAM(s)/deciliter    ASSESSMENT / RECOMMENDATIONS    MICHAEL READ is a 73y Female with a past medical history of NSCLC adenocarcinoma w mets to brain (s/p RT x1 07/2023, s/p chemo x2 (last tx 11/16/23),R hip replacement, RA, CKD IIIA, HTN and HLD presented to [facility] on 12/26 with persist dysuria and polyuria.  She was also seen in [facility] ED 12/14 for hyperglycemia 500s and UTI discharged cefpodoxime x 14 days.  Urine culture grew klebsiella oxytoca/raoutella ornithinolytica.  PT admitted for UTI, treated with IV abx.  Pt also reported frequent falls to which CT head was negative for hemorrhage.    On presentation, labs revealed Na 132, glucose 403, BUN 30, Cr 1.23, CO2 25, anion gap 11, , negative beta-hydroxybutyrate.  Pt found to hbe influenza +.  Endocrinology has been consulted for Diabetes Management.    A1C: 11.5 %  BUN: 27  Creatinine: 1.23  GFR: 46  Weight: 62.1  BMI: 24.3    # Type 2 diabetes mellitus with hyperglycemia  - pt will remain on prednisone 5  - Please keep lantus   units at bedtime.   - Keep lispro   units before each meal.  - Continue lispro moderate dose sliding scale before meals and at bedtime.  - Patient's fingerstick glucose goal is 100-180 mg/dL.    - Discharge recommendations to be discussed.   - Patient can follow up at discharge with [facility] by calling [phone] to make an appointment.        Case discussed with Dr. Blackwood. Primary team updated.       Alix Harmon  Endocrinology Fellow    Service Pager: [phone]    SUBJECTIVE / INTERVAL HPI: Patient was seen and examined this morning.     Overnight events:  afebrile and hemodynamically stable  pt continued to cough  pt said her cousin brought her popeyes chicken and 2 biscuits for dinner last night  states she does nto remember if she had dessert, she said maybe she did  she had oatmeal, eggs, turkey wyatt and coffee for the corey    12/28: lantus 15 + lispro 6 with meals    CAPILLARY BLOOD GLUCOSE & INSULIN RECEIVED  244 mg/dL (12-27 @ 21:55)  186 mg/dL (12-28 @ 09:19)  386 mg/dL (12-28 @ 13:01) 10  286 mg/dL (12-28 @ 18:08) 6  283 mg/dL (12-28 @ 21:54) - 15 + 2  331 - 6 + 8    REVIEW OF SYSTEMS  Constitutional:  Negative fever, chills or loss of appetite.  Eyes:  Negative blurry vision or double vision.  Cardiovascular:  Negative for chest pain or palpitations.  Respiratory:  Negative for cough, wheezing, or shortness of breath.    Gastrointestinal:  Negative for nausea, vomiting, diarrhea, constipation, or abdominal pain.  Genitourinary:  Negative frequency, urgency or dysuria.  Neurologic:  No headache, confusion, dizziness, lightheadedness.    PHYSICAL EXAM  Vital Signs Last 24 Hrs  T(C): 36.4 (29 Dec 2023 06:20), Max: 36.8 (28 Dec 2023 10:48)  T(F): 97.5 (29 Dec 2023 06:20), Max: 98.3 (28 Dec 2023 10:48)  HR: 62 (29 Dec 2023 06:20) (62 - 83)  BP: 153/86 (29 Dec 2023 06:20) (103/67 - 153/86)  BP(mean): --  RR: 18 (29 Dec 2023 06:20) (16 - 18)  SpO2: 99% (29 Dec 2023 06:20) (96% - 99%)    Parameters below as of 29 Dec 2023 06:20  Patient On (Oxygen Delivery Method): room air        Constitutional: Awake, alert, in no acute distress.   HEENT: Normocephalic, atraumatic, NEDRA.  Respiratory: Lungs clear to ausculation bilaterally.   Cardiovascular: regular rhythm, normal S1 and S2, no audible murmurs.   GI: soft, non-tender, non-distended, bowel sounds present.  Extremities: No lower extremity edema.  Psychiatric: AAO x 3. Normal affect/mood.     LABS  CBC - WBC/HGB/HTC/PLT: 7.23/12.2/37.9/186 (12-29-23)  BMP - Na/K/Cl/Bicarb/BUN/Cr/Gluc/AG/eGFR: 135/4.1/99/29/27/1.23/164/7/46 (12-28-23)  Ca - 9.1 (12-28-23)  Phos - 1.9 (12-28-23)  Mg - 1.8 (12-28-23)  LFT - Alb/Tprot/Tbili/Dbili/AlkPhos/ALT/AST: 3.0/--/0.3/--/85/12/14 (12-28-23)    Thyroid Stimulating Hormone, Serum: 0.473 (11-16-23)          MEDICATIONS  MEDICATIONS  (STANDING):  amLODIPine   Tablet 10 milliGRAM(s) Oral daily  aspirin  chewable 81 milliGRAM(s) Oral daily  atorvastatin 40 milliGRAM(s) Oral at bedtime  cyanocobalamin 1000 MICROGram(s) Oral daily  dextrose 5%. 1000 milliLiter(s) (50 mL/Hr) IV Continuous <Continuous>  dextrose 5%. 1000 milliLiter(s) (100 mL/Hr) IV Continuous <Continuous>  dextrose 5%. 1000 milliLiter(s) (100 mL/Hr) IV Continuous <Continuous>  dextrose 5%. 1000 milliLiter(s) (50 mL/Hr) IV Continuous <Continuous>  dextrose 50% Injectable 25 Gram(s) IV Push once  dextrose 50% Injectable 12.5 Gram(s) IV Push once  dextrose 50% Injectable 25 Gram(s) IV Push once  dextrose 50% Injectable 25 Gram(s) IV Push once  dextrose 50% Injectable 12.5 Gram(s) IV Push once  dextrose 50% Injectable 25 Gram(s) IV Push once  ertapenem  IVPB 1000 milliGRAM(s) IV Intermittent every 24 hours  folic acid 1 milliGRAM(s) Oral daily  glucagon  Injectable 1 milliGRAM(s) IntraMuscular once  glucagon  Injectable 1 milliGRAM(s) IntraMuscular once  heparin   Injectable 5000 Unit(s) SubCutaneous every 8 hours  hydroxychloroquine 200 milliGRAM(s) Oral two times a day  influenza  Vaccine (HIGH DOSE) 0.7 milliLiter(s) IntraMuscular once  insulin glargine Injectable (LANTUS) 15 Unit(s) SubCutaneous at bedtime  insulin lispro (ADMELOG) corrective regimen sliding scale   SubCutaneous Before meals and at bedtime  insulin lispro Injectable (ADMELOG) 6 Unit(s) SubCutaneous three times a day before meals  predniSONE   Tablet 5 milliGRAM(s) Oral daily  sulfaSALAzine 1000 milliGRAM(s) Oral two times a day    MEDICATIONS  (PRN):  acetaminophen     Tablet .. 650 milliGRAM(s) Oral every 6 hours PRN Temp greater or equal to 38C (100.4F), Mild Pain (1 - 3)  dextrose Oral Gel 15 Gram(s) Oral once PRN Blood Glucose LESS THAN 70 milliGRAM(s)/deciliter  dextrose Oral Gel 15 Gram(s) Oral once PRN Blood Glucose LESS THAN 70 milliGRAM(s)/deciliter    ASSESSMENT / RECOMMENDATIONS    MICHAEL READ is a 73y Female with a past medical history of NSCLC adenocarcinoma w mets to brain (s/p RT x1 07/2023, s/p chemo x2 (last tx 11/16/23),R hip replacement, RA, CKD IIIA, HTN and HLD presented to [facility] on 12/26 with persist dysuria and polyuria.  She was also seen in [facility] ED 12/14 for hyperglycemia 500s and UTI discharged cefpodoxime x 14 days.  Urine culture grew klebsiella oxytoca/raoutella ornithinolytica.  PT admitted for UTI, treated with IV abx.  Pt also reported frequent falls to which CT head was negative for hemorrhage.    On presentation, labs revealed Na 132, glucose 403, BUN 30, Cr 1.23, CO2 25, anion gap 11, , negative beta-hydroxybutyrate.  Pt found to hbe influenza +.  Endocrinology has been consulted for Diabetes Management.    A1C: 11.5 %  BUN: 27  Creatinine: 1.23  GFR: 46  Weight: 62.1  BMI: 24.3    # Type 2 diabetes mellitus with hyperglycemia  - pt will remain on prednisone 5  - Please keep lantus   units at bedtime.   - Keep lispro   units before each meal.  - Continue lispro moderate dose sliding scale before meals and at bedtime.  - Patient's fingerstick glucose goal is 100-180 mg/dL.    - Discharge recommendations to be discussed.   - Patient can follow up at discharge with [facility] by calling [phone] to make an appointment.        Case discussed with Dr. Blackwood. Primary team updated.       Alix Harmon  Endocrinology Fellow    Service Pager: [phone]    SUBJECTIVE / INTERVAL HPI: Patient was seen and examined this morning.     Overnight events:  afebrile and hemodynamically stable  pt continued to cough  pt said her cousin brought her popeyes chicken and 2 biscuits for dinner last night  states she does nto remember if she had dessert, she said maybe she did  she had oatmeal, eggs, turkey wyatt and coffee for the corey    12/28: lantus 15 + lispro 6 with meals    CAPILLARY BLOOD GLUCOSE & INSULIN RECEIVED  244 mg/dL (12-27 @ 21:55)  186 mg/dL (12-28 @ 09:19)  386 mg/dL (12-28 @ 13:01) 10  286 mg/dL (12-28 @ 18:08) 6  283 mg/dL (12-28 @ 21:54) - 15 + 2  331 - 6 + 8    REVIEW OF SYSTEMS  Constitutional:  Negative fever, chills or loss of appetite.  Eyes:  Negative blurry vision or double vision.  Cardiovascular:  Negative for chest pain or palpitations.  Respiratory:  Negative for cough, wheezing, or shortness of breath.    Gastrointestinal:  Negative for nausea, vomiting, diarrhea, constipation, or abdominal pain.  Genitourinary:  Negative frequency, urgency or dysuria.  Neurologic:  No headache, confusion, dizziness, lightheadedness.    PHYSICAL EXAM  Vital Signs Last 24 Hrs  T(C): 36.4 (29 Dec 2023 06:20), Max: 36.8 (28 Dec 2023 10:48)  T(F): 97.5 (29 Dec 2023 06:20), Max: 98.3 (28 Dec 2023 10:48)  HR: 62 (29 Dec 2023 06:20) (62 - 83)  BP: 153/86 (29 Dec 2023 06:20) (103/67 - 153/86)  BP(mean): --  RR: 18 (29 Dec 2023 06:20) (16 - 18)  SpO2: 99% (29 Dec 2023 06:20) (96% - 99%)    Parameters below as of 29 Dec 2023 06:20  Patient On (Oxygen Delivery Method): room air        Constitutional: Awake, alert, in no acute distress.   HEENT: Normocephalic, atraumatic, NEDRA.  Respiratory: Lungs clear to ausculation bilaterally.   Cardiovascular: regular rhythm, normal S1 and S2, no audible murmurs.   GI: soft, non-tender, non-distended, bowel sounds present.  Extremities: No lower extremity edema.  Psychiatric: AAO x 3. Normal affect/mood.     LABS  CBC - WBC/HGB/HTC/PLT: 7.23/12.2/37.9/186 (12-29-23)  BMP - Na/K/Cl/Bicarb/BUN/Cr/Gluc/AG/eGFR: 135/4.1/99/29/27/1.23/164/7/46 (12-28-23)  Ca - 9.1 (12-28-23)  Phos - 1.9 (12-28-23)  Mg - 1.8 (12-28-23)  LFT - Alb/Tprot/Tbili/Dbili/AlkPhos/ALT/AST: 3.0/--/0.3/--/85/12/14 (12-28-23)    Thyroid Stimulating Hormone, Serum: 0.473 (11-16-23)          MEDICATIONS  MEDICATIONS  (STANDING):  amLODIPine   Tablet 10 milliGRAM(s) Oral daily  aspirin  chewable 81 milliGRAM(s) Oral daily  atorvastatin 40 milliGRAM(s) Oral at bedtime  cyanocobalamin 1000 MICROGram(s) Oral daily  dextrose 5%. 1000 milliLiter(s) (50 mL/Hr) IV Continuous <Continuous>  dextrose 5%. 1000 milliLiter(s) (100 mL/Hr) IV Continuous <Continuous>  dextrose 5%. 1000 milliLiter(s) (100 mL/Hr) IV Continuous <Continuous>  dextrose 5%. 1000 milliLiter(s) (50 mL/Hr) IV Continuous <Continuous>  dextrose 50% Injectable 25 Gram(s) IV Push once  dextrose 50% Injectable 12.5 Gram(s) IV Push once  dextrose 50% Injectable 25 Gram(s) IV Push once  dextrose 50% Injectable 25 Gram(s) IV Push once  dextrose 50% Injectable 12.5 Gram(s) IV Push once  dextrose 50% Injectable 25 Gram(s) IV Push once  ertapenem  IVPB 1000 milliGRAM(s) IV Intermittent every 24 hours  folic acid 1 milliGRAM(s) Oral daily  glucagon  Injectable 1 milliGRAM(s) IntraMuscular once  glucagon  Injectable 1 milliGRAM(s) IntraMuscular once  heparin   Injectable 5000 Unit(s) SubCutaneous every 8 hours  hydroxychloroquine 200 milliGRAM(s) Oral two times a day  influenza  Vaccine (HIGH DOSE) 0.7 milliLiter(s) IntraMuscular once  insulin glargine Injectable (LANTUS) 15 Unit(s) SubCutaneous at bedtime  insulin lispro (ADMELOG) corrective regimen sliding scale   SubCutaneous Before meals and at bedtime  insulin lispro Injectable (ADMELOG) 6 Unit(s) SubCutaneous three times a day before meals  predniSONE   Tablet 5 milliGRAM(s) Oral daily  sulfaSALAzine 1000 milliGRAM(s) Oral two times a day    MEDICATIONS  (PRN):  acetaminophen     Tablet .. 650 milliGRAM(s) Oral every 6 hours PRN Temp greater or equal to 38C (100.4F), Mild Pain (1 - 3)  dextrose Oral Gel 15 Gram(s) Oral once PRN Blood Glucose LESS THAN 70 milliGRAM(s)/deciliter  dextrose Oral Gel 15 Gram(s) Oral once PRN Blood Glucose LESS THAN 70 milliGRAM(s)/deciliter    ASSESSMENT / RECOMMENDATIONS    MICHAEL READ is a 73y Female with a past medical history of NSCLC adenocarcinoma w mets to brain (s/p RT x1 07/2023, s/p chemo x2 (last tx 11/16/23),R hip replacement, RA, CKD IIIA, HTN and HLD presented to [facility] on 12/26 with persist dysuria and polyuria.  She was also seen in [facility] ED 12/14 for hyperglycemia 500s and UTI discharged cefpodoxime x 14 days.  Urine culture grew klebsiella oxytoca/raoutella ornithinolytica.  PT admitted for UTI, treated with IV abx.  Pt also reported frequent falls to which CT head was negative for hemorrhage.    On presentation, labs revealed Na 132, glucose 403, BUN 30, Cr 1.23, CO2 25, anion gap 11, , negative beta-hydroxybutyrate.  Pt found to hbe influenza +.  Endocrinology has been consulted for Diabetes Management.    A1C: 11.5 %  BUN: 27  Creatinine: 1.23  GFR: 46  Weight: 62.1  BMI: 24.3    # Type 2 diabetes mellitus with hyperglycemia  - pt will remain on prednisone 5  - Please keep lantus 20  units at bedtime.   - Keep lispro  6  units before each meal.  - Continue lispro moderate dose sliding scale before meals and at bedtime.  - Patient's fingerstick glucose goal is 100-180 mg/dL.    - Discharge recommendations: resume home meds Lantus 17 units at bedtime + januvia 100mg daily, start Pioglitazone 15 daily, discontinue Farxiga in setting of recurrent UTI  - Patient can follow up at discharge with [facility] by calling [phone] to make an appointment.        Case discussed with Dr. Blackwood. Primary team updated.       Alix Harmon  Endocrinology Fellow    Service Pager: [phone]    SUBJECTIVE / INTERVAL HPI: Patient was seen and examined this morning.     Overnight events:  afebrile and hemodynamically stable  pt continued to cough  pt said her cousin brought her popeyes chicken and 2 biscuits for dinner last night  states she does nto remember if she had dessert, she said maybe she did  she had oatmeal, eggs, turkey wyatt and coffee for the corey    12/28: lantus 15 + lispro 6 with meals    CAPILLARY BLOOD GLUCOSE & INSULIN RECEIVED  244 mg/dL (12-27 @ 21:55)  186 mg/dL (12-28 @ 09:19)  386 mg/dL (12-28 @ 13:01) 10  286 mg/dL (12-28 @ 18:08) 6  283 mg/dL (12-28 @ 21:54) - 15 + 2  331 - 6 + 8    REVIEW OF SYSTEMS  Constitutional:  Negative fever, chills or loss of appetite.  Eyes:  Negative blurry vision or double vision.  Cardiovascular:  Negative for chest pain or palpitations.  Respiratory:  Negative for cough, wheezing, or shortness of breath.    Gastrointestinal:  Negative for nausea, vomiting, diarrhea, constipation, or abdominal pain.  Genitourinary:  Negative frequency, urgency or dysuria.  Neurologic:  No headache, confusion, dizziness, lightheadedness.    PHYSICAL EXAM  Vital Signs Last 24 Hrs  T(C): 36.4 (29 Dec 2023 06:20), Max: 36.8 (28 Dec 2023 10:48)  T(F): 97.5 (29 Dec 2023 06:20), Max: 98.3 (28 Dec 2023 10:48)  HR: 62 (29 Dec 2023 06:20) (62 - 83)  BP: 153/86 (29 Dec 2023 06:20) (103/67 - 153/86)  BP(mean): --  RR: 18 (29 Dec 2023 06:20) (16 - 18)  SpO2: 99% (29 Dec 2023 06:20) (96% - 99%)    Parameters below as of 29 Dec 2023 06:20  Patient On (Oxygen Delivery Method): room air        Constitutional: Awake, alert, in no acute distress.   HEENT: Normocephalic, atraumatic, NEDRA.  Respiratory: Lungs clear to ausculation bilaterally.   Cardiovascular: regular rhythm, normal S1 and S2, no audible murmurs.   GI: soft, non-tender, non-distended, bowel sounds present.  Extremities: No lower extremity edema.  Psychiatric: AAO x 3. Normal affect/mood.     LABS  CBC - WBC/HGB/HTC/PLT: 7.23/12.2/37.9/186 (12-29-23)  BMP - Na/K/Cl/Bicarb/BUN/Cr/Gluc/AG/eGFR: 135/4.1/99/29/27/1.23/164/7/46 (12-28-23)  Ca - 9.1 (12-28-23)  Phos - 1.9 (12-28-23)  Mg - 1.8 (12-28-23)  LFT - Alb/Tprot/Tbili/Dbili/AlkPhos/ALT/AST: 3.0/--/0.3/--/85/12/14 (12-28-23)    Thyroid Stimulating Hormone, Serum: 0.473 (11-16-23)          MEDICATIONS  MEDICATIONS  (STANDING):  amLODIPine   Tablet 10 milliGRAM(s) Oral daily  aspirin  chewable 81 milliGRAM(s) Oral daily  atorvastatin 40 milliGRAM(s) Oral at bedtime  cyanocobalamin 1000 MICROGram(s) Oral daily  dextrose 5%. 1000 milliLiter(s) (50 mL/Hr) IV Continuous <Continuous>  dextrose 5%. 1000 milliLiter(s) (100 mL/Hr) IV Continuous <Continuous>  dextrose 5%. 1000 milliLiter(s) (100 mL/Hr) IV Continuous <Continuous>  dextrose 5%. 1000 milliLiter(s) (50 mL/Hr) IV Continuous <Continuous>  dextrose 50% Injectable 25 Gram(s) IV Push once  dextrose 50% Injectable 12.5 Gram(s) IV Push once  dextrose 50% Injectable 25 Gram(s) IV Push once  dextrose 50% Injectable 25 Gram(s) IV Push once  dextrose 50% Injectable 12.5 Gram(s) IV Push once  dextrose 50% Injectable 25 Gram(s) IV Push once  ertapenem  IVPB 1000 milliGRAM(s) IV Intermittent every 24 hours  folic acid 1 milliGRAM(s) Oral daily  glucagon  Injectable 1 milliGRAM(s) IntraMuscular once  glucagon  Injectable 1 milliGRAM(s) IntraMuscular once  heparin   Injectable 5000 Unit(s) SubCutaneous every 8 hours  hydroxychloroquine 200 milliGRAM(s) Oral two times a day  influenza  Vaccine (HIGH DOSE) 0.7 milliLiter(s) IntraMuscular once  insulin glargine Injectable (LANTUS) 15 Unit(s) SubCutaneous at bedtime  insulin lispro (ADMELOG) corrective regimen sliding scale   SubCutaneous Before meals and at bedtime  insulin lispro Injectable (ADMELOG) 6 Unit(s) SubCutaneous three times a day before meals  predniSONE   Tablet 5 milliGRAM(s) Oral daily  sulfaSALAzine 1000 milliGRAM(s) Oral two times a day    MEDICATIONS  (PRN):  acetaminophen     Tablet .. 650 milliGRAM(s) Oral every 6 hours PRN Temp greater or equal to 38C (100.4F), Mild Pain (1 - 3)  dextrose Oral Gel 15 Gram(s) Oral once PRN Blood Glucose LESS THAN 70 milliGRAM(s)/deciliter  dextrose Oral Gel 15 Gram(s) Oral once PRN Blood Glucose LESS THAN 70 milliGRAM(s)/deciliter    ASSESSMENT / RECOMMENDATIONS    MICHAEL READ is a 73y Female with a past medical history of NSCLC adenocarcinoma w mets to brain (s/p RT x1 07/2023, s/p chemo x2 (last tx 11/16/23),R hip replacement, RA, CKD IIIA, HTN and HLD presented to [facility] on 12/26 with persist dysuria and polyuria.  She was also seen in [facility] ED 12/14 for hyperglycemia 500s and UTI discharged cefpodoxime x 14 days.  Urine culture grew klebsiella oxytoca/raoutella ornithinolytica.  PT admitted for UTI, treated with IV abx.  Pt also reported frequent falls to which CT head was negative for hemorrhage.    On presentation, labs revealed Na 132, glucose 403, BUN 30, Cr 1.23, CO2 25, anion gap 11, , negative beta-hydroxybutyrate.  Pt found to hbe influenza +.  Endocrinology has been consulted for Diabetes Management.    A1C: 11.5 %  BUN: 27  Creatinine: 1.23  GFR: 46  Weight: 62.1  BMI: 24.3    # Type 2 diabetes mellitus with hyperglycemia  - pt will remain on prednisone 5  - Please keep lantus 20  units at bedtime.   - Keep lispro  6  units before each meal.  - Continue lispro moderate dose sliding scale before meals and at bedtime.  - Patient's fingerstick glucose goal is 100-180 mg/dL.    - Discharge recommendations: resume home meds Lantus 17 units at bedtime + januvia 100mg daily, start Pioglitazone 15 daily, discontinue Farxiga in setting of recurrent UTI  - Patient can follow up at discharge with [facility] by calling [phone] to make an appointment.        Case discussed with Dr. Blackwood. Primary team updated.       Alix Harmon  Endocrinology Fellow    Service Pager: [phone]

## 2023-12-29 NOTE — DISCHARGE NOTE PROVIDER - NSDCCPCAREPLAN_GEN_ALL_CORE_FT
PRINCIPAL DISCHARGE DIAGNOSIS  Diagnosis: Acute UTI  Assessment and Plan of Treatment: You were admitted for a urinary tract infection and treated with antibiotics   Should you develop urinary symtpoms again please follow up with your Primary care doctor      SECONDARY DISCHARGE DIAGNOSES  Diagnosis: Hyperglycemia  Assessment and Plan of Treatment: Your blood gluocose levels have been highly elevated during your admission. You must follow up with your primary care doctor and endocrinology team outpatient   you were started on pioglitazone and your farxiga has been discontinued

## 2023-12-30 LAB
ALBUMIN SERPL ELPH-MCNC: 3.3 G/DL — SIGNIFICANT CHANGE UP (ref 3.3–5)
ALBUMIN SERPL ELPH-MCNC: 3.3 G/DL — SIGNIFICANT CHANGE UP (ref 3.3–5)
ALP SERPL-CCNC: 102 U/L — SIGNIFICANT CHANGE UP (ref 40–120)
ALP SERPL-CCNC: 102 U/L — SIGNIFICANT CHANGE UP (ref 40–120)
ALT FLD-CCNC: 9 U/L — LOW (ref 10–45)
ALT FLD-CCNC: 9 U/L — LOW (ref 10–45)
ANION GAP SERPL CALC-SCNC: 10 MMOL/L — SIGNIFICANT CHANGE UP (ref 5–17)
ANION GAP SERPL CALC-SCNC: 10 MMOL/L — SIGNIFICANT CHANGE UP (ref 5–17)
AST SERPL-CCNC: 17 U/L — SIGNIFICANT CHANGE UP (ref 10–40)
AST SERPL-CCNC: 17 U/L — SIGNIFICANT CHANGE UP (ref 10–40)
BILIRUB SERPL-MCNC: 0.3 MG/DL — SIGNIFICANT CHANGE UP (ref 0.2–1.2)
BILIRUB SERPL-MCNC: 0.3 MG/DL — SIGNIFICANT CHANGE UP (ref 0.2–1.2)
BUN SERPL-MCNC: 25 MG/DL — HIGH (ref 7–23)
BUN SERPL-MCNC: 25 MG/DL — HIGH (ref 7–23)
CALCIUM SERPL-MCNC: 9.4 MG/DL — SIGNIFICANT CHANGE UP (ref 8.4–10.5)
CALCIUM SERPL-MCNC: 9.4 MG/DL — SIGNIFICANT CHANGE UP (ref 8.4–10.5)
CHLORIDE SERPL-SCNC: 101 MMOL/L — SIGNIFICANT CHANGE UP (ref 96–108)
CHLORIDE SERPL-SCNC: 101 MMOL/L — SIGNIFICANT CHANGE UP (ref 96–108)
CO2 SERPL-SCNC: 26 MMOL/L — SIGNIFICANT CHANGE UP (ref 22–31)
CO2 SERPL-SCNC: 26 MMOL/L — SIGNIFICANT CHANGE UP (ref 22–31)
CREAT SERPL-MCNC: 1.19 MG/DL — SIGNIFICANT CHANGE UP (ref 0.5–1.3)
CREAT SERPL-MCNC: 1.19 MG/DL — SIGNIFICANT CHANGE UP (ref 0.5–1.3)
EGFR: 48 ML/MIN/1.73M2 — LOW
EGFR: 48 ML/MIN/1.73M2 — LOW
GLUCOSE BLDC GLUCOMTR-MCNC: 153 MG/DL — HIGH (ref 70–99)
GLUCOSE BLDC GLUCOMTR-MCNC: 153 MG/DL — HIGH (ref 70–99)
GLUCOSE BLDC GLUCOMTR-MCNC: 180 MG/DL — HIGH (ref 70–99)
GLUCOSE BLDC GLUCOMTR-MCNC: 180 MG/DL — HIGH (ref 70–99)
GLUCOSE BLDC GLUCOMTR-MCNC: 204 MG/DL — HIGH (ref 70–99)
GLUCOSE BLDC GLUCOMTR-MCNC: 204 MG/DL — HIGH (ref 70–99)
GLUCOSE BLDC GLUCOMTR-MCNC: 374 MG/DL — HIGH (ref 70–99)
GLUCOSE BLDC GLUCOMTR-MCNC: 374 MG/DL — HIGH (ref 70–99)
GLUCOSE SERPL-MCNC: 184 MG/DL — HIGH (ref 70–99)
GLUCOSE SERPL-MCNC: 184 MG/DL — HIGH (ref 70–99)
HCT VFR BLD CALC: 35.7 % — SIGNIFICANT CHANGE UP (ref 34.5–45)
HCT VFR BLD CALC: 35.7 % — SIGNIFICANT CHANGE UP (ref 34.5–45)
HGB BLD-MCNC: 11.5 G/DL — SIGNIFICANT CHANGE UP (ref 11.5–15.5)
HGB BLD-MCNC: 11.5 G/DL — SIGNIFICANT CHANGE UP (ref 11.5–15.5)
MAGNESIUM SERPL-MCNC: 1.6 MG/DL — SIGNIFICANT CHANGE UP (ref 1.6–2.6)
MAGNESIUM SERPL-MCNC: 1.6 MG/DL — SIGNIFICANT CHANGE UP (ref 1.6–2.6)
MCHC RBC-ENTMCNC: 29 PG — SIGNIFICANT CHANGE UP (ref 27–34)
MCHC RBC-ENTMCNC: 29 PG — SIGNIFICANT CHANGE UP (ref 27–34)
MCHC RBC-ENTMCNC: 32.2 GM/DL — SIGNIFICANT CHANGE UP (ref 32–36)
MCHC RBC-ENTMCNC: 32.2 GM/DL — SIGNIFICANT CHANGE UP (ref 32–36)
MCV RBC AUTO: 89.9 FL — SIGNIFICANT CHANGE UP (ref 80–100)
MCV RBC AUTO: 89.9 FL — SIGNIFICANT CHANGE UP (ref 80–100)
NRBC # BLD: 0 /100 WBCS — SIGNIFICANT CHANGE UP (ref 0–0)
NRBC # BLD: 0 /100 WBCS — SIGNIFICANT CHANGE UP (ref 0–0)
PHOSPHATE SERPL-MCNC: 3.2 MG/DL — SIGNIFICANT CHANGE UP (ref 2.5–4.5)
PHOSPHATE SERPL-MCNC: 3.2 MG/DL — SIGNIFICANT CHANGE UP (ref 2.5–4.5)
PLATELET # BLD AUTO: 188 K/UL — SIGNIFICANT CHANGE UP (ref 150–400)
PLATELET # BLD AUTO: 188 K/UL — SIGNIFICANT CHANGE UP (ref 150–400)
POTASSIUM SERPL-MCNC: 4.2 MMOL/L — SIGNIFICANT CHANGE UP (ref 3.5–5.3)
POTASSIUM SERPL-MCNC: 4.2 MMOL/L — SIGNIFICANT CHANGE UP (ref 3.5–5.3)
POTASSIUM SERPL-SCNC: 4.2 MMOL/L — SIGNIFICANT CHANGE UP (ref 3.5–5.3)
POTASSIUM SERPL-SCNC: 4.2 MMOL/L — SIGNIFICANT CHANGE UP (ref 3.5–5.3)
PROT SERPL-MCNC: 6.3 G/DL — SIGNIFICANT CHANGE UP (ref 6–8.3)
PROT SERPL-MCNC: 6.3 G/DL — SIGNIFICANT CHANGE UP (ref 6–8.3)
RBC # BLD: 3.97 M/UL — SIGNIFICANT CHANGE UP (ref 3.8–5.2)
RBC # BLD: 3.97 M/UL — SIGNIFICANT CHANGE UP (ref 3.8–5.2)
RBC # FLD: 14.4 % — SIGNIFICANT CHANGE UP (ref 10.3–14.5)
RBC # FLD: 14.4 % — SIGNIFICANT CHANGE UP (ref 10.3–14.5)
SODIUM SERPL-SCNC: 137 MMOL/L — SIGNIFICANT CHANGE UP (ref 135–145)
SODIUM SERPL-SCNC: 137 MMOL/L — SIGNIFICANT CHANGE UP (ref 135–145)
WBC # BLD: 8.08 K/UL — SIGNIFICANT CHANGE UP (ref 3.8–10.5)
WBC # BLD: 8.08 K/UL — SIGNIFICANT CHANGE UP (ref 3.8–10.5)
WBC # FLD AUTO: 8.08 K/UL — SIGNIFICANT CHANGE UP (ref 3.8–10.5)
WBC # FLD AUTO: 8.08 K/UL — SIGNIFICANT CHANGE UP (ref 3.8–10.5)

## 2023-12-30 PROCEDURE — 99232 SBSQ HOSP IP/OBS MODERATE 35: CPT | Mod: GC

## 2023-12-30 RX ORDER — INSULIN GLARGINE 100 [IU]/ML
22 INJECTION, SOLUTION SUBCUTANEOUS AT BEDTIME
Refills: 0 | Status: DISCONTINUED | OUTPATIENT
Start: 2023-12-30 | End: 2024-01-03

## 2023-12-30 RX ORDER — INSULIN LISPRO 100/ML
8 VIAL (ML) SUBCUTANEOUS
Refills: 0 | Status: DISCONTINUED | OUTPATIENT
Start: 2023-12-30 | End: 2023-12-31

## 2023-12-30 RX ADMIN — PREGABALIN 1000 MICROGRAM(S): 225 CAPSULE ORAL at 11:20

## 2023-12-30 RX ADMIN — Medication 2: at 09:28

## 2023-12-30 RX ADMIN — Medication 6 UNIT(S): at 12:29

## 2023-12-30 RX ADMIN — Medication 1000 MILLIGRAM(S): at 18:32

## 2023-12-30 RX ADMIN — HEPARIN SODIUM 5000 UNIT(S): 5000 INJECTION INTRAVENOUS; SUBCUTANEOUS at 06:36

## 2023-12-30 RX ADMIN — Medication 200 MILLIGRAM(S): at 18:32

## 2023-12-30 RX ADMIN — AMLODIPINE BESYLATE 10 MILLIGRAM(S): 2.5 TABLET ORAL at 06:36

## 2023-12-30 RX ADMIN — Medication 6 UNIT(S): at 18:32

## 2023-12-30 RX ADMIN — INSULIN GLARGINE 22 UNIT(S): 100 INJECTION, SOLUTION SUBCUTANEOUS at 22:43

## 2023-12-30 RX ADMIN — HEPARIN SODIUM 5000 UNIT(S): 5000 INJECTION INTRAVENOUS; SUBCUTANEOUS at 21:41

## 2023-12-30 RX ADMIN — Medication 1 MILLIGRAM(S): at 11:20

## 2023-12-30 RX ADMIN — Medication 4: at 18:32

## 2023-12-30 RX ADMIN — ATORVASTATIN CALCIUM 40 MILLIGRAM(S): 80 TABLET, FILM COATED ORAL at 21:41

## 2023-12-30 RX ADMIN — Medication 6 UNIT(S): at 09:28

## 2023-12-30 RX ADMIN — Medication 10: at 12:29

## 2023-12-30 RX ADMIN — Medication 2: at 22:40

## 2023-12-30 RX ADMIN — Medication 1000 MILLIGRAM(S): at 06:36

## 2023-12-30 RX ADMIN — Medication 200 MILLIGRAM(S): at 06:35

## 2023-12-30 RX ADMIN — Medication 5 MILLIGRAM(S): at 06:35

## 2023-12-30 NOTE — PROGRESS NOTE ADULT - PROBLEM SELECTOR PLAN 3
Reports home . In ED,  Bhb negative. Took prednisone 5mg for the last two days  Home meds lantus 17U, lispro 5U before meals, januvia 100mg. A1c 11.5  s/p 5U humulin in ED  - c/w lantus 15U and 6 units pre meal   - Continue lispro moderate dose sliding scale before meals and at bedtime.  - Patient's fingerstick glucose goal is 100-180 mg/dL.

## 2023-12-30 NOTE — PROGRESS NOTE ADULT - SUBJECTIVE AND OBJECTIVE BOX
SUBJECTIVE / INTERVAL HPI: Patient was seen and examined this morning.     CAPILLARY BLOOD GLUCOSE & INSULIN RECEIVED  265 mg/dL (12-29 @ 09:31)  331 mg/dL (12-29 @ 12:58)  241 mg/dL (12-29 @ 17:41)  226 mg/dL (12-29 @ 21:49)  180 mg/dL (12-30 @ 08:37)  374 mg/dL (12-30 @ 12:20)    REVIEW OF SYSTEMS  Constitutional:  Negative fever, chills or loss of appetite.  Eyes:  Negative blurry vision or double vision.  Cardiovascular:  Negative for chest pain or palpitations.  Respiratory:  Negative for cough, wheezing, or shortness of breath.    Gastrointestinal:  Negative for nausea, vomiting, diarrhea, constipation, or abdominal pain.  Genitourinary:  Negative frequency, urgency or dysuria.  Neurologic:  No headache, confusion, dizziness, lightheadedness.    PHYSICAL EXAM  Vital Signs Last 24 Hrs  T(C): 36.7 (30 Dec 2023 05:25), Max: 36.8 (29 Dec 2023 15:06)  T(F): 98.1 (30 Dec 2023 05:25), Max: 98.3 (29 Dec 2023 15:06)  HR: 64 (30 Dec 2023 05:25) (64 - 75)  BP: 139/75 (30 Dec 2023 06:00) (130/65 - 148/78)  BP(mean): --  RR: 16 (30 Dec 2023 05:25) (16 - 18)  SpO2: 96% (30 Dec 2023 05:25) (96% - 98%)    Parameters below as of 29 Dec 2023 21:45  Patient On (Oxygen Delivery Method): room air    Constitutional: Awake, alert, in no acute distress.   HEENT: Normocephalic, atraumatic, NEDRA.  Respiratory: Lungs clear to ausculation bilaterally.   Cardiovascular: regular rhythm, normal S1 and S2, no audible murmurs.   GI: soft, non-tender, non-distended, bowel sounds present.  Extremities: No lower extremity edema.  Psychiatric: AAO x 3. Normal affect/mood.     LABS  CBC - WBC/HGB/HTC/PLT: 8.08/11.5/35.7/188 (12-30-23)  BMP - Na/K/Cl/Bicarb/BUN/Cr/Gluc/AG/eGFR: 137/4.2/101/26/25/1.19/184/10/48 (12-30-23)  Ca - 9.4 (12-30-23)  Phos - 3.2 (12-30-23)  Mg - 1.6 (12-30-23)  LFT - Alb/Tprot/Tbili/Dbili/AlkPhos/ALT/AST: 3.3/--/0.3/--/102/9/17 (12-30-23)  Thyroid Stimulating Hormone, Serum: 0.473 (11-16-23)    MEDICATIONS  MEDICATIONS  (STANDING):  amLODIPine   Tablet 10 milliGRAM(s) Oral daily  aspirin  chewable 81 milliGRAM(s) Oral daily  atorvastatin 40 milliGRAM(s) Oral at bedtime  cyanocobalamin 1000 MICROGram(s) Oral daily  dextrose 5%. 1000 milliLiter(s) (50 mL/Hr) IV Continuous <Continuous>  dextrose 5%. 1000 milliLiter(s) (100 mL/Hr) IV Continuous <Continuous>  dextrose 5%. 1000 milliLiter(s) (100 mL/Hr) IV Continuous <Continuous>  dextrose 5%. 1000 milliLiter(s) (50 mL/Hr) IV Continuous <Continuous>  dextrose 50% Injectable 25 Gram(s) IV Push once  dextrose 50% Injectable 25 Gram(s) IV Push once  dextrose 50% Injectable 12.5 Gram(s) IV Push once  dextrose 50% Injectable 25 Gram(s) IV Push once  dextrose 50% Injectable 25 Gram(s) IV Push once  dextrose 50% Injectable 12.5 Gram(s) IV Push once  folic acid 1 milliGRAM(s) Oral daily  glucagon  Injectable 1 milliGRAM(s) IntraMuscular once  glucagon  Injectable 1 milliGRAM(s) IntraMuscular once  heparin   Injectable 5000 Unit(s) SubCutaneous every 8 hours  hydroxychloroquine 200 milliGRAM(s) Oral two times a day  influenza  Vaccine (HIGH DOSE) 0.7 milliLiter(s) IntraMuscular once  insulin glargine Injectable (LANTUS) 20 Unit(s) SubCutaneous at bedtime  insulin lispro (ADMELOG) corrective regimen sliding scale   SubCutaneous Before meals and at bedtime  insulin lispro Injectable (ADMELOG) 6 Unit(s) SubCutaneous three times a day before meals  predniSONE   Tablet 5 milliGRAM(s) Oral daily  sulfaSALAzine 1000 milliGRAM(s) Oral two times a day    MEDICATIONS  (PRN):  acetaminophen     Tablet .. 650 milliGRAM(s) Oral every 6 hours PRN Temp greater or equal to 38C (100.4F), Mild Pain (1 - 3)  dextrose Oral Gel 15 Gram(s) Oral once PRN Blood Glucose LESS THAN 70 milliGRAM(s)/deciliter  dextrose Oral Gel 15 Gram(s) Oral once PRN Blood Glucose LESS THAN 70 milliGRAM(s)/deciliter    ASSESSMENT / RECOMMENDATIONS    A1C: 11.5 %  BUN: 25  Creatinine: 1.19  GFR: 48  Weight: 62.1  BMI: 24.3  EF:       Case discussed with Dr. Burgess. Primary team updated.       Vishnu Lee    Endocrinology Fellow    Service Pager: [phone]    SUBJECTIVE / INTERVAL HPI: Patient was seen and examined this morning.     CAPILLARY BLOOD GLUCOSE & INSULIN RECEIVED  265 mg/dL (12-29 @ 09:31)  331 mg/dL (12-29 @ 12:58)  241 mg/dL (12-29 @ 17:41)  226 mg/dL (12-29 @ 21:49)  180 mg/dL (12-30 @ 08:37)  374 mg/dL (12-30 @ 12:20)    REVIEW OF SYSTEMS  Constitutional:  Negative fever, chills or loss of appetite.  Eyes:  Negative blurry vision or double vision.  Cardiovascular:  Negative for chest pain or palpitations.  Respiratory:  Negative for cough, wheezing, or shortness of breath.    Gastrointestinal:  Negative for nausea, vomiting, diarrhea, constipation, or abdominal pain.  Genitourinary:  Negative frequency, urgency or dysuria.  Neurologic:  No headache, confusion, dizziness, lightheadedness.    PHYSICAL EXAM  Vital Signs Last 24 Hrs  T(C): 36.7 (30 Dec 2023 05:25), Max: 36.8 (29 Dec 2023 15:06)  T(F): 98.1 (30 Dec 2023 05:25), Max: 98.3 (29 Dec 2023 15:06)  HR: 64 (30 Dec 2023 05:25) (64 - 75)  BP: 139/75 (30 Dec 2023 06:00) (130/65 - 148/78)  BP(mean): --  RR: 16 (30 Dec 2023 05:25) (16 - 18)  SpO2: 96% (30 Dec 2023 05:25) (96% - 98%)    Parameters below as of 29 Dec 2023 21:45  Patient On (Oxygen Delivery Method): room air    Constitutional: Awake, alert, in no acute distress.   HEENT: Normocephalic, atraumatic, NEDRA.  Respiratory: Lungs clear to ausculation bilaterally.   Cardiovascular: regular rhythm, normal S1 and S2, no audible murmurs.   GI: soft, non-tender, non-distended, bowel sounds present.  Extremities: No lower extremity edema.  Psychiatric: AAO x 3. Normal affect/mood.     LABS  CBC - WBC/HGB/HTC/PLT: 8.08/11.5/35.7/188 (12-30-23)  BMP - Na/K/Cl/Bicarb/BUN/Cr/Gluc/AG/eGFR: 137/4.2/101/26/25/1.19/184/10/48 (12-30-23)  Ca - 9.4 (12-30-23)  Phos - 3.2 (12-30-23)  Mg - 1.6 (12-30-23)  LFT - Alb/Tprot/Tbili/Dbili/AlkPhos/ALT/AST: 3.3/--/0.3/--/102/9/17 (12-30-23)  Thyroid Stimulating Hormone, Serum: 0.473 (11-16-23)    MEDICATIONS  MEDICATIONS  (STANDING):  amLODIPine   Tablet 10 milliGRAM(s) Oral daily  aspirin  chewable 81 milliGRAM(s) Oral daily  atorvastatin 40 milliGRAM(s) Oral at bedtime  cyanocobalamin 1000 MICROGram(s) Oral daily  dextrose 5%. 1000 milliLiter(s) (50 mL/Hr) IV Continuous <Continuous>  dextrose 5%. 1000 milliLiter(s) (100 mL/Hr) IV Continuous <Continuous>  dextrose 5%. 1000 milliLiter(s) (100 mL/Hr) IV Continuous <Continuous>  dextrose 5%. 1000 milliLiter(s) (50 mL/Hr) IV Continuous <Continuous>  dextrose 50% Injectable 25 Gram(s) IV Push once  dextrose 50% Injectable 25 Gram(s) IV Push once  dextrose 50% Injectable 12.5 Gram(s) IV Push once  dextrose 50% Injectable 25 Gram(s) IV Push once  dextrose 50% Injectable 25 Gram(s) IV Push once  dextrose 50% Injectable 12.5 Gram(s) IV Push once  folic acid 1 milliGRAM(s) Oral daily  glucagon  Injectable 1 milliGRAM(s) IntraMuscular once  glucagon  Injectable 1 milliGRAM(s) IntraMuscular once  heparin   Injectable 5000 Unit(s) SubCutaneous every 8 hours  hydroxychloroquine 200 milliGRAM(s) Oral two times a day  influenza  Vaccine (HIGH DOSE) 0.7 milliLiter(s) IntraMuscular once  insulin glargine Injectable (LANTUS) 20 Unit(s) SubCutaneous at bedtime  insulin lispro (ADMELOG) corrective regimen sliding scale   SubCutaneous Before meals and at bedtime  insulin lispro Injectable (ADMELOG) 6 Unit(s) SubCutaneous three times a day before meals  predniSONE   Tablet 5 milliGRAM(s) Oral daily  sulfaSALAzine 1000 milliGRAM(s) Oral two times a day    MEDICATIONS  (PRN):  acetaminophen     Tablet .. 650 milliGRAM(s) Oral every 6 hours PRN Temp greater or equal to 38C (100.4F), Mild Pain (1 - 3)  dextrose Oral Gel 15 Gram(s) Oral once PRN Blood Glucose LESS THAN 70 milliGRAM(s)/deciliter  dextrose Oral Gel 15 Gram(s) Oral once PRN Blood Glucose LESS THAN 70 milliGRAM(s)/deciliter    ASSESSMENT / RECOMMENDATIONS    A1C: 11.5 %  BUN: 25  Creatinine: 1.19  GFR: 48  Weight: 62.1  BMI: 24.3  EF:       Case discussed with Dr. Burgess. Primary team updated.       Vishnu Lee    Endocrinology Fellow    Service Pager: [phone]    SUBJECTIVE / INTERVAL HPI: Patient was seen and examined this morning. Her glucose levels remain elevated despite increasing lantus to 20 units last night. Additionally, she has been consistently requiring correction scale coverage.     CAPILLARY BLOOD GLUCOSE & INSULIN RECEIVED  283 mg/dL (12-28 @ 21:54) -> 15 units of lantus + 2 units of lispro sliding scale.  265 mg/dL (12-29 @ 09:31) -> 6 units of lispro.    331 mg/dL (12-29 @ 12:58) -> 6 units of lispro + 8 units of lispro sliding scale.  241 mg/dL (12-29 @ 17:41) -> 6 units of lispro + 4 units of lispro sliding scale.  226 mg/dL (12-29 @ 21:49) -> 20 units of lantus + 4 units of lispro sliding scale.  180 mg/dL (12-30 @ 08:37) -> 6 units of lispro + 6 units of lispro sliding scale.   374 mg/dL (12-30 @ 12:20)    REVIEW OF SYSTEMS  Constitutional:  Negative fever, chills or loss of appetite.  Cardiovascular:  Negative for chest pain or palpitations.  Respiratory:  Negative for cough, wheezing, or shortness of breath.    Gastrointestinal:  Negative for nausea, vomiting, diarrhea, constipation, or abdominal pain.  Neurologic:  No headache, confusion, dizziness, lightheadedness.    PHYSICAL EXAM  Vital Signs Last 24 Hrs  T(C): 36.7 (30 Dec 2023 05:25), Max: 36.8 (29 Dec 2023 15:06)  T(F): 98.1 (30 Dec 2023 05:25), Max: 98.3 (29 Dec 2023 15:06)  HR: 64 (30 Dec 2023 05:25) (64 - 75)  BP: 139/75 (30 Dec 2023 06:00) (130/65 - 148/78)  BP(mean): --  RR: 16 (30 Dec 2023 05:25) (16 - 18)  SpO2: 96% (30 Dec 2023 05:25) (96% - 98%)    Parameters below as of 29 Dec 2023 21:45  Patient On (Oxygen Delivery Method): room air    Constitutional: Awake, alert, in no acute distress.   HEENT: Normocephalic, atraumatic, NEDRA.  Respiratory: Lungs clear to ausculation bilaterally.   Cardiovascular: regular rhythm, normal S1 and S2, no audible murmurs.   GI: soft, non-tender, non-distended, bowel sounds present.  Extremities: No lower extremity edema.  Psychiatric: AAO x 3. Normal affect/mood.     LABS  CBC - WBC/HGB/HTC/PLT: 8.08/11.5/35.7/188 (12-30-23)  BMP - Na/K/Cl/Bicarb/BUN/Cr/Gluc/AG/eGFR: 137/4.2/101/26/25/1.19/184/10/48 (12-30-23)  Ca - 9.4 (12-30-23)  Phos - 3.2 (12-30-23)  Mg - 1.6 (12-30-23)  LFT - Alb/Tprot/Tbili/Dbili/AlkPhos/ALT/AST: 3.3/--/0.3/--/102/9/17 (12-30-23)  Thyroid Stimulating Hormone, Serum: 0.473 (11-16-23)    MEDICATIONS  MEDICATIONS  (STANDING):  amLODIPine   Tablet 10 milliGRAM(s) Oral daily  aspirin  chewable 81 milliGRAM(s) Oral daily  atorvastatin 40 milliGRAM(s) Oral at bedtime  cyanocobalamin 1000 MICROGram(s) Oral daily  dextrose 5%. 1000 milliLiter(s) (50 mL/Hr) IV Continuous <Continuous>  dextrose 5%. 1000 milliLiter(s) (100 mL/Hr) IV Continuous <Continuous>  dextrose 5%. 1000 milliLiter(s) (100 mL/Hr) IV Continuous <Continuous>  dextrose 5%. 1000 milliLiter(s) (50 mL/Hr) IV Continuous <Continuous>  dextrose 50% Injectable 25 Gram(s) IV Push once  dextrose 50% Injectable 25 Gram(s) IV Push once  dextrose 50% Injectable 12.5 Gram(s) IV Push once  dextrose 50% Injectable 25 Gram(s) IV Push once  dextrose 50% Injectable 25 Gram(s) IV Push once  dextrose 50% Injectable 12.5 Gram(s) IV Push once  folic acid 1 milliGRAM(s) Oral daily  glucagon  Injectable 1 milliGRAM(s) IntraMuscular once  glucagon  Injectable 1 milliGRAM(s) IntraMuscular once  heparin   Injectable 5000 Unit(s) SubCutaneous every 8 hours  hydroxychloroquine 200 milliGRAM(s) Oral two times a day  influenza  Vaccine (HIGH DOSE) 0.7 milliLiter(s) IntraMuscular once  insulin glargine Injectable (LANTUS) 20 Unit(s) SubCutaneous at bedtime  insulin lispro (ADMELOG) corrective regimen sliding scale   SubCutaneous Before meals and at bedtime  insulin lispro Injectable (ADMELOG) 6 Unit(s) SubCutaneous three times a day before meals  predniSONE   Tablet 5 milliGRAM(s) Oral daily  sulfaSALAzine 1000 milliGRAM(s) Oral two times a day    MEDICATIONS  (PRN):  acetaminophen     Tablet .. 650 milliGRAM(s) Oral every 6 hours PRN Temp greater or equal to 38C (100.4F), Mild Pain (1 - 3)  dextrose Oral Gel 15 Gram(s) Oral once PRN Blood Glucose LESS THAN 70 milliGRAM(s)/deciliter  dextrose Oral Gel 15 Gram(s) Oral once PRN Blood Glucose LESS THAN 70 milliGRAM(s)/deciliter    ASSESSMENT / RECOMMENDATIONS  Ms. Minor is a 73-year-old female with a past medical history of type 2 diabetes mellitus, hypertension, hyperlipidemia, chronic kidney disease 3a, NSCLC adenocarcinoma with mets to brain (s/p RT x1 07/2023, s/p chemo x2 (last tx 11/16/23), rheumatoid arthritis and right hip replacement who presented to [facility] on 12/26/23 with persist dysuria and polyuria. Labs were also significant for (+) influenza. She was for further management of urinary tract infection and influenza. Endocrinology was consulted for recommendations regarding management of diabetes.     A1C: 11.5 %  BUN: 25  Creatinine: 1.19  GFR: 48  Weight: 62.1  BMI: 24.3    # Type 2 diabetes mellitus with hyperglycemia  - She remains on prednisone 5 mg daily.   - Her glucose levels remain elevated despite increasing lantus to 20 units last night. Additionally, she has been consistently requiring correction scale coverage.   - Please INCREASE lantus to 22 units at bedtime.   - INCREASE lispro to 8 units before each meal.  - Continue lispro moderate dose sliding scale before meals and at bedtime.  - Patient's fingerstick glucose goal is 100-180 mg/dL.    - Discharge recommendations to be discussed.   - Patient can follow up at discharge with [facility] by calling [phone] to make an appointment.      Case discussed with Dr. Burgess. Primary team updated.       Vishnu Lee    Endocrinology Fellow    Service Pager: [phone]    SUBJECTIVE / INTERVAL HPI: Patient was seen and examined this morning. Her glucose levels remain elevated despite increasing lantus to 20 units last night. Additionally, she has been consistently requiring correction scale coverage.     CAPILLARY BLOOD GLUCOSE & INSULIN RECEIVED  283 mg/dL (12-28 @ 21:54) -> 15 units of lantus + 2 units of lispro sliding scale.  265 mg/dL (12-29 @ 09:31) -> 6 units of lispro.    331 mg/dL (12-29 @ 12:58) -> 6 units of lispro + 8 units of lispro sliding scale.  241 mg/dL (12-29 @ 17:41) -> 6 units of lispro + 4 units of lispro sliding scale.  226 mg/dL (12-29 @ 21:49) -> 20 units of lantus + 4 units of lispro sliding scale.  180 mg/dL (12-30 @ 08:37) -> 6 units of lispro + 6 units of lispro sliding scale.   374 mg/dL (12-30 @ 12:20)    REVIEW OF SYSTEMS  Constitutional:  Negative fever, chills or loss of appetite.  Cardiovascular:  Negative for chest pain or palpitations.  Respiratory:  Negative for cough, wheezing, or shortness of breath.    Gastrointestinal:  Negative for nausea, vomiting, diarrhea, constipation, or abdominal pain.  Neurologic:  No headache, confusion, dizziness, lightheadedness.    PHYSICAL EXAM  Vital Signs Last 24 Hrs  T(C): 36.7 (30 Dec 2023 05:25), Max: 36.8 (29 Dec 2023 15:06)  T(F): 98.1 (30 Dec 2023 05:25), Max: 98.3 (29 Dec 2023 15:06)  HR: 64 (30 Dec 2023 05:25) (64 - 75)  BP: 139/75 (30 Dec 2023 06:00) (130/65 - 148/78)  BP(mean): --  RR: 16 (30 Dec 2023 05:25) (16 - 18)  SpO2: 96% (30 Dec 2023 05:25) (96% - 98%)    Parameters below as of 29 Dec 2023 21:45  Patient On (Oxygen Delivery Method): room air    Constitutional: Awake, alert, in no acute distress.   HEENT: Normocephalic, atraumatic, NEDRA.  Respiratory: Lungs clear to ausculation bilaterally.   Cardiovascular: regular rhythm, normal S1 and S2, no audible murmurs.   GI: soft, non-tender, non-distended, bowel sounds present.  Extremities: No lower extremity edema.  Psychiatric: AAO x 3. Normal affect/mood.     LABS  CBC - WBC/HGB/HTC/PLT: 8.08/11.5/35.7/188 (12-30-23)  BMP - Na/K/Cl/Bicarb/BUN/Cr/Gluc/AG/eGFR: 137/4.2/101/26/25/1.19/184/10/48 (12-30-23)  Ca - 9.4 (12-30-23)  Phos - 3.2 (12-30-23)  Mg - 1.6 (12-30-23)  LFT - Alb/Tprot/Tbili/Dbili/AlkPhos/ALT/AST: 3.3/--/0.3/--/102/9/17 (12-30-23)  Thyroid Stimulating Hormone, Serum: 0.473 (11-16-23)    MEDICATIONS  MEDICATIONS  (STANDING):  amLODIPine   Tablet 10 milliGRAM(s) Oral daily  aspirin  chewable 81 milliGRAM(s) Oral daily  atorvastatin 40 milliGRAM(s) Oral at bedtime  cyanocobalamin 1000 MICROGram(s) Oral daily  dextrose 5%. 1000 milliLiter(s) (50 mL/Hr) IV Continuous <Continuous>  dextrose 5%. 1000 milliLiter(s) (100 mL/Hr) IV Continuous <Continuous>  dextrose 5%. 1000 milliLiter(s) (100 mL/Hr) IV Continuous <Continuous>  dextrose 5%. 1000 milliLiter(s) (50 mL/Hr) IV Continuous <Continuous>  dextrose 50% Injectable 25 Gram(s) IV Push once  dextrose 50% Injectable 25 Gram(s) IV Push once  dextrose 50% Injectable 12.5 Gram(s) IV Push once  dextrose 50% Injectable 25 Gram(s) IV Push once  dextrose 50% Injectable 25 Gram(s) IV Push once  dextrose 50% Injectable 12.5 Gram(s) IV Push once  folic acid 1 milliGRAM(s) Oral daily  glucagon  Injectable 1 milliGRAM(s) IntraMuscular once  glucagon  Injectable 1 milliGRAM(s) IntraMuscular once  heparin   Injectable 5000 Unit(s) SubCutaneous every 8 hours  hydroxychloroquine 200 milliGRAM(s) Oral two times a day  influenza  Vaccine (HIGH DOSE) 0.7 milliLiter(s) IntraMuscular once  insulin glargine Injectable (LANTUS) 20 Unit(s) SubCutaneous at bedtime  insulin lispro (ADMELOG) corrective regimen sliding scale   SubCutaneous Before meals and at bedtime  insulin lispro Injectable (ADMELOG) 6 Unit(s) SubCutaneous three times a day before meals  predniSONE   Tablet 5 milliGRAM(s) Oral daily  sulfaSALAzine 1000 milliGRAM(s) Oral two times a day    MEDICATIONS  (PRN):  acetaminophen     Tablet .. 650 milliGRAM(s) Oral every 6 hours PRN Temp greater or equal to 38C (100.4F), Mild Pain (1 - 3)  dextrose Oral Gel 15 Gram(s) Oral once PRN Blood Glucose LESS THAN 70 milliGRAM(s)/deciliter  dextrose Oral Gel 15 Gram(s) Oral once PRN Blood Glucose LESS THAN 70 milliGRAM(s)/deciliter    ASSESSMENT / RECOMMENDATIONS  Ms. Minor is a 73-year-old female with a past medical history of type 2 diabetes mellitus, hypertension, hyperlipidemia, chronic kidney disease 3a, NSCLC adenocarcinoma with mets to brain (s/p RT x1 07/2023, s/p chemo x2 (last tx 11/16/23), rheumatoid arthritis and right hip replacement who presented to [facility] on 12/26/23 with persist dysuria and polyuria. Labs were also significant for (+) influenza. She was for further management of urinary tract infection and influenza. Endocrinology was consulted for recommendations regarding management of diabetes.     A1C: 11.5 %  BUN: 25  Creatinine: 1.19  GFR: 48  Weight: 62.1  BMI: 24.3    # Type 2 diabetes mellitus with hyperglycemia  - She remains on prednisone 5 mg daily.   - Her glucose levels remain elevated despite increasing lantus to 20 units last night. Additionally, she has been consistently requiring correction scale coverage.   - Please INCREASE lantus to 22 units at bedtime.   - INCREASE lispro to 8 units before each meal.  - Continue lispro moderate dose sliding scale before meals and at bedtime.  - Patient's fingerstick glucose goal is 100-180 mg/dL.    - Discharge recommendations to be discussed.   - Patient can follow up at discharge with [facility] by calling [phone] to make an appointment.      Case discussed with Dr. Burgess. Primary team updated.       Vishnu Lee    Endocrinology Fellow    Service Pager: [phone]

## 2023-12-30 NOTE — PROGRESS NOTE ADULT - PROBLEM SELECTOR PLAN 1
Presenting with persistent dysuria and polyuria  Presented to [facility] ED 11/16 for UTI discharged on cefpodoxime x 14 days which pt completed  Ucx 11/16 and 12/14 showed 100k  klebsiella oxytoca/raoutella ornithinolytica resistant to CTX. Was taking farxiga, prednisone 5mg   Afebrile, not septic  Reports  - ID approval for ertapenem 3 day course, f/u once sensitivties arise   - hold farxiga iso UTI Presenting with persistent dysuria and polyuria  Presented to [facility] ED 11/16 for UTI discharged on cefpodoxime x 14 days which pt completed  Ucx 11/16 and 12/14 showed 100k  klebsiella oxytoca/raoutella ornithinolytica resistant to CTX. Was taking farxiga, prednisone 5mg   Afebrile, not septic  Reports  - ID approval for ertapenem 3 day course, f/u once sensitivties arise   - hold farxiga iso UTI

## 2023-12-30 NOTE — PROGRESS NOTE ADULT - SUBJECTIVE AND OBJECTIVE BOX
INTERVAL HPI/OVERNIGHT EVENTS:  Patient no complaint but seems confused  Glucose note;  Feel uncomfortable with discharge with present glucose reading; She will have a problem at home;  Endocrine to follow up       MEDICATIONS  (STANDING):  amLODIPine   Tablet 10 milliGRAM(s) Oral daily  aspirin  chewable 81 milliGRAM(s) Oral daily  atorvastatin 40 milliGRAM(s) Oral at bedtime  cyanocobalamin 1000 MICROGram(s) Oral daily  dextrose 5%. 1000 milliLiter(s) (50 mL/Hr) IV Continuous <Continuous>  dextrose 5%. 1000 milliLiter(s) (100 mL/Hr) IV Continuous <Continuous>  dextrose 5%. 1000 milliLiter(s) (100 mL/Hr) IV Continuous <Continuous>  dextrose 5%. 1000 milliLiter(s) (50 mL/Hr) IV Continuous <Continuous>  dextrose 50% Injectable 25 Gram(s) IV Push once  dextrose 50% Injectable 12.5 Gram(s) IV Push once  dextrose 50% Injectable 25 Gram(s) IV Push once  dextrose 50% Injectable 25 Gram(s) IV Push once  dextrose 50% Injectable 12.5 Gram(s) IV Push once  dextrose 50% Injectable 25 Gram(s) IV Push once  folic acid 1 milliGRAM(s) Oral daily  glucagon  Injectable 1 milliGRAM(s) IntraMuscular once  glucagon  Injectable 1 milliGRAM(s) IntraMuscular once  heparin   Injectable 5000 Unit(s) SubCutaneous every 8 hours  hydroxychloroquine 200 milliGRAM(s) Oral two times a day  influenza  Vaccine (HIGH DOSE) 0.7 milliLiter(s) IntraMuscular once  insulin glargine Injectable (LANTUS) 20 Unit(s) SubCutaneous at bedtime  insulin lispro (ADMELOG) corrective regimen sliding scale   SubCutaneous Before meals and at bedtime  insulin lispro Injectable (ADMELOG) 6 Unit(s) SubCutaneous three times a day before meals  predniSONE   Tablet 5 milliGRAM(s) Oral daily  sulfaSALAzine 1000 milliGRAM(s) Oral two times a day    MEDICATIONS  (PRN):  acetaminophen     Tablet .. 650 milliGRAM(s) Oral every 6 hours PRN Temp greater or equal to 38C (100.4F), Mild Pain (1 - 3)  dextrose Oral Gel 15 Gram(s) Oral once PRN Blood Glucose LESS THAN 70 milliGRAM(s)/deciliter  dextrose Oral Gel 15 Gram(s) Oral once PRN Blood Glucose LESS THAN 70 milliGRAM(s)/deciliter      Allergies    citrus (Urticaria)  Bactrim (Rash)    Intolerances        Vital Signs Last 24 Hrs  T(C): 36.7 (30 Dec 2023 05:25), Max: 36.7 (30 Dec 2023 05:25)  T(F): 98.1 (30 Dec 2023 05:25), Max: 98.1 (30 Dec 2023 05:25)  HR: 64 (30 Dec 2023 05:25) (64 - 65)  BP: 139/75 (30 Dec 2023 06:00) (130/65 - 148/78)  BP(mean): --  RR: 16 (30 Dec 2023 05:25) (16 - 18)  SpO2: 96% (30 Dec 2023 05:25) (96% - 97%)    Parameters below as of 29 Dec 2023 21:45  Patient On (Oxygen Delivery Method): room air              Constitutional:  Awake     Eyes: NEDRA    ENMT: Negative    Neck: Supple    Back:  no tenderness     Respiratory:  clear    Cardiovascular: S1 S2    Gastrointestinal:  soft     Genitourinary:    Extremities:  no edema     Vascular:    Neurological:    Skin:    Lymph Nodes:            LABS:                        11.5   8.08  )-----------( 188      ( 30 Dec 2023 05:30 )             35.7     12-30    137  |  101  |  25<H>  ----------------------------<  184<H>  4.2   |  26  |  1.19    Ca    9.4      30 Dec 2023 05:30  Phos  3.2     12-30  Mg     1.6     12-30    TPro  6.3  /  Alb  3.3  /  TBili  0.3  /  DBili  x   /  AST  17  /  ALT  9<L>  /  AlkPhos  102  12-30      Urinalysis Basic - ( 30 Dec 2023 05:30 )    Color: x / Appearance: x / SG: x / pH: x  Gluc: 184 mg/dL / Ketone: x  / Bili: x / Urobili: x   Blood: x / Protein: x / Nitrite: x   Leuk Esterase: x / RBC: x / WBC x   Sq Epi: x / Non Sq Epi: x / Bacteria: x        RADIOLOGY & ADDITIONAL TESTS:

## 2023-12-30 NOTE — PROGRESS NOTE ADULT - SUBJECTIVE AND OBJECTIVE BOX
Medicine Progress Note    INTERVAL EVENTS:   No acute events overnight.    SUBJECTIVE:   Patient seen and examined at bedside. Condition largely unchanged from yesterday. No acute complaints at this time.    ROS:  Negative unless otherwise stated above.    PHYSICAL EXAM:  Constitutional: NAD, comfortable in bed.  HEENT: NC/AT, PERRLA, EOMI, no conjunctival pallor or scleral icterus, MMM  Neck: Supple, no JVD  Respiratory: CTA B/L. No w/r/r.   Cardiovascular: RRR, normal S1 and S2, no m/r/g.   Gastrointestinal: +BS, soft NTND, no guarding or rebound tenderness, no palpable masses   Extremities: wwp; no cyanosis, clubbing or edema.   Vascular: Pulses equal and strong throughout.   Neurological: AAOx3, no CN deficits, strength and sensation intact throughout.   Skin: No gross skin abnormalities or rashes    VITAL SIGNS:  Vital Signs Last 24 Hrs  T(C): 36.7 (30 Dec 2023 05:25), Max: 36.8 (29 Dec 2023 15:06)  T(F): 98.1 (30 Dec 2023 05:25), Max: 98.3 (29 Dec 2023 15:06)  HR: 64 (30 Dec 2023 05:25) (64 - 75)  BP: 139/75 (30 Dec 2023 06:00) (130/65 - 148/78)  BP(mean): --  RR: 16 (30 Dec 2023 05:25) (16 - 18)  SpO2: 96% (30 Dec 2023 05:25) (96% - 98%)    Parameters below as of 29 Dec 2023 21:45  Patient On (Oxygen Delivery Method): room air      <INS & OUTS:    INPATIENT MEDICATIONS:   MEDICATIONS  (STANDING):  amLODIPine   Tablet 10 milliGRAM(s) Oral daily  aspirin  chewable 81 milliGRAM(s) Oral daily  atorvastatin 40 milliGRAM(s) Oral at bedtime  cyanocobalamin 1000 MICROGram(s) Oral daily  dextrose 5%. 1000 milliLiter(s) (50 mL/Hr) IV Continuous <Continuous>  dextrose 5%. 1000 milliLiter(s) (100 mL/Hr) IV Continuous <Continuous>  dextrose 5%. 1000 milliLiter(s) (100 mL/Hr) IV Continuous <Continuous>  dextrose 5%. 1000 milliLiter(s) (50 mL/Hr) IV Continuous <Continuous>  dextrose 50% Injectable 25 Gram(s) IV Push once  dextrose 50% Injectable 25 Gram(s) IV Push once  dextrose 50% Injectable 12.5 Gram(s) IV Push once  dextrose 50% Injectable 25 Gram(s) IV Push once  dextrose 50% Injectable 12.5 Gram(s) IV Push once  dextrose 50% Injectable 25 Gram(s) IV Push once  folic acid 1 milliGRAM(s) Oral daily  glucagon  Injectable 1 milliGRAM(s) IntraMuscular once  glucagon  Injectable 1 milliGRAM(s) IntraMuscular once  heparin   Injectable 5000 Unit(s) SubCutaneous every 8 hours  hydroxychloroquine 200 milliGRAM(s) Oral two times a day  influenza  Vaccine (HIGH DOSE) 0.7 milliLiter(s) IntraMuscular once  insulin glargine Injectable (LANTUS) 20 Unit(s) SubCutaneous at bedtime  insulin lispro (ADMELOG) corrective regimen sliding scale   SubCutaneous Before meals and at bedtime  insulin lispro Injectable (ADMELOG) 6 Unit(s) SubCutaneous three times a day before meals  predniSONE   Tablet 5 milliGRAM(s) Oral daily  sulfaSALAzine 1000 milliGRAM(s) Oral two times a day    MEDICATIONS  (PRN):  acetaminophen     Tablet .. 650 milliGRAM(s) Oral every 6 hours PRN Temp greater or equal to 38C (100.4F), Mild Pain (1 - 3)  dextrose Oral Gel 15 Gram(s) Oral once PRN Blood Glucose LESS THAN 70 milliGRAM(s)/deciliter  dextrose Oral Gel 15 Gram(s) Oral once PRN Blood Glucose LESS THAN 70 milliGRAM(s)/deciliter    ALLERGIES:  Allergies    citrus (Urticaria)  Bactrim (Rash)    Intolerances    LABS:                       11.5   8.08  )-----------( 188      ( 30 Dec 2023 05:30 )             35.7     12-30    137  |  101  |  25<H>  ----------------------------<  184<H>  4.2   |  26  |  1.19    Ca    9.4      30 Dec 2023 05:30  Phos  3.2     12-30  Mg     1.6     12-30    TPro  6.3  /  Alb  3.3  /  TBili  0.3  /  DBili  x   /  AST  17  /  ALT  9<L>  /  AlkPhos  102  12-30      Urinalysis Basic - ( 30 Dec 2023 05:30 )    Color: x / Appearance: x / SG: x / pH: x  Gluc: 184 mg/dL / Ketone: x  / Bili: x / Urobili: x   Blood: x / Protein: x / Nitrite: x   Leuk Esterase: x / RBC: x / WBC x   Sq Epi: x / Non Sq Epi: x / Bacteria: x      CAPILLARY BLOOD GLUCOSE      POCT Blood Glucose.: 226 mg/dL (29 Dec 2023 21:49)    RADIOLOGY & ADDITIONAL TESTS: Reviewed.

## 2023-12-31 LAB
ALBUMIN SERPL ELPH-MCNC: 2.7 G/DL — LOW (ref 3.3–5)
ALBUMIN SERPL ELPH-MCNC: 2.7 G/DL — LOW (ref 3.3–5)
ALP SERPL-CCNC: 92 U/L — SIGNIFICANT CHANGE UP (ref 40–120)
ALP SERPL-CCNC: 92 U/L — SIGNIFICANT CHANGE UP (ref 40–120)
ALT FLD-CCNC: 8 U/L — LOW (ref 10–45)
ALT FLD-CCNC: 8 U/L — LOW (ref 10–45)
ANION GAP SERPL CALC-SCNC: 9 MMOL/L — SIGNIFICANT CHANGE UP (ref 5–17)
ANION GAP SERPL CALC-SCNC: 9 MMOL/L — SIGNIFICANT CHANGE UP (ref 5–17)
AST SERPL-CCNC: 17 U/L — SIGNIFICANT CHANGE UP (ref 10–40)
AST SERPL-CCNC: 17 U/L — SIGNIFICANT CHANGE UP (ref 10–40)
BASOPHILS # BLD AUTO: 0 K/UL — SIGNIFICANT CHANGE UP (ref 0–0.2)
BASOPHILS # BLD AUTO: 0 K/UL — SIGNIFICANT CHANGE UP (ref 0–0.2)
BASOPHILS NFR BLD AUTO: 0 % — SIGNIFICANT CHANGE UP (ref 0–2)
BASOPHILS NFR BLD AUTO: 0 % — SIGNIFICANT CHANGE UP (ref 0–2)
BILIRUB SERPL-MCNC: 0.2 MG/DL — SIGNIFICANT CHANGE UP (ref 0.2–1.2)
BILIRUB SERPL-MCNC: 0.2 MG/DL — SIGNIFICANT CHANGE UP (ref 0.2–1.2)
BUN SERPL-MCNC: 25 MG/DL — HIGH (ref 7–23)
BUN SERPL-MCNC: 25 MG/DL — HIGH (ref 7–23)
CALCIUM SERPL-MCNC: 9 MG/DL — SIGNIFICANT CHANGE UP (ref 8.4–10.5)
CALCIUM SERPL-MCNC: 9 MG/DL — SIGNIFICANT CHANGE UP (ref 8.4–10.5)
CHLORIDE SERPL-SCNC: 103 MMOL/L — SIGNIFICANT CHANGE UP (ref 96–108)
CHLORIDE SERPL-SCNC: 103 MMOL/L — SIGNIFICANT CHANGE UP (ref 96–108)
CO2 SERPL-SCNC: 24 MMOL/L — SIGNIFICANT CHANGE UP (ref 22–31)
CO2 SERPL-SCNC: 24 MMOL/L — SIGNIFICANT CHANGE UP (ref 22–31)
CREAT SERPL-MCNC: 1.43 MG/DL — HIGH (ref 0.5–1.3)
CREAT SERPL-MCNC: 1.43 MG/DL — HIGH (ref 0.5–1.3)
CULTURE RESULTS: SIGNIFICANT CHANGE UP
EGFR: 38 ML/MIN/1.73M2 — LOW
EGFR: 38 ML/MIN/1.73M2 — LOW
EOSINOPHIL # BLD AUTO: 0.07 K/UL — SIGNIFICANT CHANGE UP (ref 0–0.5)
EOSINOPHIL # BLD AUTO: 0.07 K/UL — SIGNIFICANT CHANGE UP (ref 0–0.5)
EOSINOPHIL NFR BLD AUTO: 0.9 % — SIGNIFICANT CHANGE UP (ref 0–6)
EOSINOPHIL NFR BLD AUTO: 0.9 % — SIGNIFICANT CHANGE UP (ref 0–6)
GLUCOSE BLDC GLUCOMTR-MCNC: 103 MG/DL — HIGH (ref 70–99)
GLUCOSE BLDC GLUCOMTR-MCNC: 103 MG/DL — HIGH (ref 70–99)
GLUCOSE BLDC GLUCOMTR-MCNC: 130 MG/DL — HIGH (ref 70–99)
GLUCOSE BLDC GLUCOMTR-MCNC: 130 MG/DL — HIGH (ref 70–99)
GLUCOSE BLDC GLUCOMTR-MCNC: 173 MG/DL — HIGH (ref 70–99)
GLUCOSE BLDC GLUCOMTR-MCNC: 173 MG/DL — HIGH (ref 70–99)
GLUCOSE BLDC GLUCOMTR-MCNC: 233 MG/DL — HIGH (ref 70–99)
GLUCOSE BLDC GLUCOMTR-MCNC: 233 MG/DL — HIGH (ref 70–99)
GLUCOSE SERPL-MCNC: 145 MG/DL — HIGH (ref 70–99)
GLUCOSE SERPL-MCNC: 145 MG/DL — HIGH (ref 70–99)
HCT VFR BLD CALC: 34.5 % — SIGNIFICANT CHANGE UP (ref 34.5–45)
HCT VFR BLD CALC: 34.5 % — SIGNIFICANT CHANGE UP (ref 34.5–45)
HGB BLD-MCNC: 11 G/DL — LOW (ref 11.5–15.5)
HGB BLD-MCNC: 11 G/DL — LOW (ref 11.5–15.5)
LYMPHOCYTES # BLD AUTO: 1.79 K/UL — SIGNIFICANT CHANGE UP (ref 1–3.3)
LYMPHOCYTES # BLD AUTO: 1.79 K/UL — SIGNIFICANT CHANGE UP (ref 1–3.3)
LYMPHOCYTES # BLD AUTO: 22 % — SIGNIFICANT CHANGE UP (ref 13–44)
LYMPHOCYTES # BLD AUTO: 22 % — SIGNIFICANT CHANGE UP (ref 13–44)
MAGNESIUM SERPL-MCNC: 1.4 MG/DL — LOW (ref 1.6–2.6)
MAGNESIUM SERPL-MCNC: 1.4 MG/DL — LOW (ref 1.6–2.6)
MANUAL SMEAR VERIFICATION: SIGNIFICANT CHANGE UP
MANUAL SMEAR VERIFICATION: SIGNIFICANT CHANGE UP
MCHC RBC-ENTMCNC: 29.1 PG — SIGNIFICANT CHANGE UP (ref 27–34)
MCHC RBC-ENTMCNC: 29.1 PG — SIGNIFICANT CHANGE UP (ref 27–34)
MCHC RBC-ENTMCNC: 31.9 GM/DL — LOW (ref 32–36)
MCHC RBC-ENTMCNC: 31.9 GM/DL — LOW (ref 32–36)
MCV RBC AUTO: 91.3 FL — SIGNIFICANT CHANGE UP (ref 80–100)
MCV RBC AUTO: 91.3 FL — SIGNIFICANT CHANGE UP (ref 80–100)
METAMYELOCYTES # FLD: 0.9 % — HIGH (ref 0–0)
METAMYELOCYTES # FLD: 0.9 % — HIGH (ref 0–0)
MONOCYTES # BLD AUTO: 0.9 K/UL — SIGNIFICANT CHANGE UP (ref 0–0.9)
MONOCYTES # BLD AUTO: 0.9 K/UL — SIGNIFICANT CHANGE UP (ref 0–0.9)
MONOCYTES NFR BLD AUTO: 11 % — SIGNIFICANT CHANGE UP (ref 2–14)
MONOCYTES NFR BLD AUTO: 11 % — SIGNIFICANT CHANGE UP (ref 2–14)
MYELOCYTES NFR BLD: 0.9 % — HIGH (ref 0–0)
MYELOCYTES NFR BLD: 0.9 % — HIGH (ref 0–0)
NEUTROPHILS # BLD AUTO: 5.24 K/UL — SIGNIFICANT CHANGE UP (ref 1.8–7.4)
NEUTROPHILS # BLD AUTO: 5.24 K/UL — SIGNIFICANT CHANGE UP (ref 1.8–7.4)
NEUTROPHILS NFR BLD AUTO: 64.3 % — SIGNIFICANT CHANGE UP (ref 43–77)
NEUTROPHILS NFR BLD AUTO: 64.3 % — SIGNIFICANT CHANGE UP (ref 43–77)
PHOSPHATE SERPL-MCNC: 3.5 MG/DL — SIGNIFICANT CHANGE UP (ref 2.5–4.5)
PHOSPHATE SERPL-MCNC: 3.5 MG/DL — SIGNIFICANT CHANGE UP (ref 2.5–4.5)
PLAT MORPH BLD: NORMAL — SIGNIFICANT CHANGE UP
PLAT MORPH BLD: NORMAL — SIGNIFICANT CHANGE UP
PLATELET # BLD AUTO: 182 K/UL — SIGNIFICANT CHANGE UP (ref 150–400)
PLATELET # BLD AUTO: 182 K/UL — SIGNIFICANT CHANGE UP (ref 150–400)
POLYCHROMASIA BLD QL SMEAR: SLIGHT — SIGNIFICANT CHANGE UP
POLYCHROMASIA BLD QL SMEAR: SLIGHT — SIGNIFICANT CHANGE UP
POTASSIUM SERPL-MCNC: 4.2 MMOL/L — SIGNIFICANT CHANGE UP (ref 3.5–5.3)
POTASSIUM SERPL-MCNC: 4.2 MMOL/L — SIGNIFICANT CHANGE UP (ref 3.5–5.3)
POTASSIUM SERPL-SCNC: 4.2 MMOL/L — SIGNIFICANT CHANGE UP (ref 3.5–5.3)
POTASSIUM SERPL-SCNC: 4.2 MMOL/L — SIGNIFICANT CHANGE UP (ref 3.5–5.3)
PROT SERPL-MCNC: 5.9 G/DL — LOW (ref 6–8.3)
PROT SERPL-MCNC: 5.9 G/DL — LOW (ref 6–8.3)
RBC # BLD: 3.78 M/UL — LOW (ref 3.8–5.2)
RBC # BLD: 3.78 M/UL — LOW (ref 3.8–5.2)
RBC # FLD: 14.8 % — HIGH (ref 10.3–14.5)
RBC # FLD: 14.8 % — HIGH (ref 10.3–14.5)
RBC BLD AUTO: ABNORMAL
RBC BLD AUTO: ABNORMAL
SMUDGE CELLS # BLD: PRESENT — SIGNIFICANT CHANGE UP
SMUDGE CELLS # BLD: PRESENT — SIGNIFICANT CHANGE UP
SODIUM SERPL-SCNC: 136 MMOL/L — SIGNIFICANT CHANGE UP (ref 135–145)
SODIUM SERPL-SCNC: 136 MMOL/L — SIGNIFICANT CHANGE UP (ref 135–145)
SPECIMEN SOURCE: SIGNIFICANT CHANGE UP
WBC # BLD: 8.15 K/UL — SIGNIFICANT CHANGE UP (ref 3.8–10.5)
WBC # BLD: 8.15 K/UL — SIGNIFICANT CHANGE UP (ref 3.8–10.5)
WBC # FLD AUTO: 8.15 K/UL — SIGNIFICANT CHANGE UP (ref 3.8–10.5)
WBC # FLD AUTO: 8.15 K/UL — SIGNIFICANT CHANGE UP (ref 3.8–10.5)

## 2023-12-31 RX ORDER — INSULIN LISPRO 100/ML
10 VIAL (ML) SUBCUTANEOUS
Refills: 0 | Status: DISCONTINUED | OUTPATIENT
Start: 2023-12-31 | End: 2024-01-03

## 2023-12-31 RX ORDER — MAGNESIUM SULFATE 500 MG/ML
4 VIAL (ML) INJECTION ONCE
Refills: 0 | Status: COMPLETED | OUTPATIENT
Start: 2023-12-31 | End: 2023-12-31

## 2023-12-31 RX ADMIN — HEPARIN SODIUM 5000 UNIT(S): 5000 INJECTION INTRAVENOUS; SUBCUTANEOUS at 05:59

## 2023-12-31 RX ADMIN — Medication 5 MILLIGRAM(S): at 05:57

## 2023-12-31 RX ADMIN — Medication 200 MILLIGRAM(S): at 18:24

## 2023-12-31 RX ADMIN — Medication 8 UNIT(S): at 13:45

## 2023-12-31 RX ADMIN — Medication 200 MILLIGRAM(S): at 05:58

## 2023-12-31 RX ADMIN — INSULIN GLARGINE 22 UNIT(S): 100 INJECTION, SOLUTION SUBCUTANEOUS at 22:35

## 2023-12-31 RX ADMIN — Medication 25 GRAM(S): at 14:30

## 2023-12-31 RX ADMIN — Medication 1000 MILLIGRAM(S): at 18:23

## 2023-12-31 RX ADMIN — ATORVASTATIN CALCIUM 40 MILLIGRAM(S): 80 TABLET, FILM COATED ORAL at 21:44

## 2023-12-31 RX ADMIN — HEPARIN SODIUM 5000 UNIT(S): 5000 INJECTION INTRAVENOUS; SUBCUTANEOUS at 21:44

## 2023-12-31 RX ADMIN — Medication 2: at 18:08

## 2023-12-31 RX ADMIN — Medication 4: at 13:45

## 2023-12-31 RX ADMIN — HEPARIN SODIUM 5000 UNIT(S): 5000 INJECTION INTRAVENOUS; SUBCUTANEOUS at 14:09

## 2023-12-31 RX ADMIN — Medication 8 UNIT(S): at 09:35

## 2023-12-31 RX ADMIN — Medication 10 UNIT(S): at 18:08

## 2023-12-31 RX ADMIN — PREGABALIN 1000 MICROGRAM(S): 225 CAPSULE ORAL at 11:43

## 2023-12-31 RX ADMIN — AMLODIPINE BESYLATE 10 MILLIGRAM(S): 2.5 TABLET ORAL at 05:58

## 2023-12-31 RX ADMIN — Medication 1000 MILLIGRAM(S): at 05:57

## 2023-12-31 RX ADMIN — Medication 1 MILLIGRAM(S): at 11:43

## 2023-12-31 NOTE — PROGRESS NOTE ADULT - SUBJECTIVE AND OBJECTIVE BOX
SUBJECTIVE / INTERVAL HPI: Patient was seen and examined this morning.     CAPILLARY BLOOD GLUCOSE & INSULIN RECEIVED  180 mg/dL (12-30 @ 08:37)  374 mg/dL (12-30 @ 12:20)  204 mg/dL (12-30 @ 18:20)  153 mg/dL (12-30 @ 22:23)  130 mg/dL (12-31 @ 09:14)  233 mg/dL (12-31 @ 13:13)  173 mg/dL (12-31 @ 17:11)      REVIEW OF SYSTEMS  Constitutional:  Negative fever, chills or loss of appetite.  Eyes:  Negative blurry vision or double vision.  Cardiovascular:  Negative for chest pain or palpitations.  Respiratory:  Negative for cough, wheezing, or shortness of breath.    Gastrointestinal:  Negative for nausea, vomiting, diarrhea, constipation, or abdominal pain.  Genitourinary:  Negative frequency, urgency or dysuria.  Neurologic:  No headache, confusion, dizziness, lightheadedness.    PHYSICAL EXAM  Vital Signs Last 24 Hrs  T(C): 37.1 (31 Dec 2023 15:53), Max: 37.1 (30 Dec 2023 20:25)  T(F): 98.7 (31 Dec 2023 15:53), Max: 98.7 (30 Dec 2023 20:25)  HR: 74 (31 Dec 2023 15:53) (74 - 86)  BP: 125/77 (31 Dec 2023 15:53) (124/76 - 145/83)  BP(mean): 103 (30 Dec 2023 20:25) (103 - 103)  RR: 18 (31 Dec 2023 15:53) (17 - 18)  SpO2: 96% (31 Dec 2023 15:53) (96% - 98%)    Parameters below as of 31 Dec 2023 15:53  Patient On (Oxygen Delivery Method): room air        Constitutional: Awake, alert, in no acute distress.   HEENT: Normocephalic, atraumatic, NEDRA.  Respiratory: Lungs clear to ausculation bilaterally.   Cardiovascular: regular rhythm, normal S1 and S2, no audible murmurs.   GI: soft, non-tender, non-distended, bowel sounds present.  Extremities: No lower extremity edema.  Psychiatric: AAO x 3. Normal affect/mood.     LABS  CBC - WBC/HGB/HTC/PLT: 8.15/11.0/34.5/182 (12-31-23)  BMP - Na/K/Cl/Bicarb/BUN/Cr/Gluc/AG/eGFR: 136/4.2/103/24/25/1.43/145/9/38 (12-31-23)  Ca - 9.0 (12-31-23)  Phos - 3.5 (12-31-23)  Mg - 1.4 (12-31-23)  LFT - Alb/Tprot/Tbili/Dbili/AlkPhos/ALT/AST: 2.7/--/0.2/--/92/8/17 (12-31-23)    Thyroid Stimulating Hormone, Serum: 0.473 (11-16-23)      MEDICATIONS  MEDICATIONS  (STANDING):  amLODIPine   Tablet 10 milliGRAM(s) Oral daily  atorvastatin 40 milliGRAM(s) Oral at bedtime  cyanocobalamin 1000 MICROGram(s) Oral daily  dextrose 5%. 1000 milliLiter(s) (100 mL/Hr) IV Continuous <Continuous>  dextrose 5%. 1000 milliLiter(s) (50 mL/Hr) IV Continuous <Continuous>  dextrose 5%. 1000 milliLiter(s) (100 mL/Hr) IV Continuous <Continuous>  dextrose 5%. 1000 milliLiter(s) (50 mL/Hr) IV Continuous <Continuous>  dextrose 50% Injectable 25 Gram(s) IV Push once  dextrose 50% Injectable 12.5 Gram(s) IV Push once  dextrose 50% Injectable 25 Gram(s) IV Push once  dextrose 50% Injectable 25 Gram(s) IV Push once  dextrose 50% Injectable 25 Gram(s) IV Push once  dextrose 50% Injectable 12.5 Gram(s) IV Push once  folic acid 1 milliGRAM(s) Oral daily  glucagon  Injectable 1 milliGRAM(s) IntraMuscular once  glucagon  Injectable 1 milliGRAM(s) IntraMuscular once  heparin   Injectable 5000 Unit(s) SubCutaneous every 8 hours  hydroxychloroquine 200 milliGRAM(s) Oral two times a day  influenza  Vaccine (HIGH DOSE) 0.7 milliLiter(s) IntraMuscular once  insulin glargine Injectable (LANTUS) 22 Unit(s) SubCutaneous at bedtime  insulin lispro (ADMELOG) corrective regimen sliding scale   SubCutaneous Before meals and at bedtime  insulin lispro Injectable (ADMELOG) 8 Unit(s) SubCutaneous three times a day before meals  predniSONE   Tablet 5 milliGRAM(s) Oral daily  sulfaSALAzine 1000 milliGRAM(s) Oral two times a day    MEDICATIONS  (PRN):  acetaminophen     Tablet .. 650 milliGRAM(s) Oral every 6 hours PRN Temp greater or equal to 38C (100.4F), Mild Pain (1 - 3)  dextrose Oral Gel 15 Gram(s) Oral once PRN Blood Glucose LESS THAN 70 milliGRAM(s)/deciliter  dextrose Oral Gel 15 Gram(s) Oral once PRN Blood Glucose LESS THAN 70 milliGRAM(s)/deciliter    ASSESSMENT / RECOMMENDATIONS    A1C: 11.5 %  BUN: 25  Creatinine: 1.43  GFR: 38  Weight: 62.1  BMI: 24.3  EF:     # Type 2 diabetes mellitus with hyperglycemia  - Please continue lantus *** units at bedtime.   - Continue lispro *** units before each meal.  - Continue lispro moderate / low dose sliding scale before meals and at bedtime.  - Patient's fingerstick glucose goal is 100-180 mg/dL.    - Discharge recommendations to be discussed.   - Patient can follow up at discharge with [facility] by calling [phone] to make an appointment.      Case discussed with Dr. Burgess. Primary team updated.       Vishnu Lee    Endocrinology Fellow    Service Pager: [phone]    SUBJECTIVE / INTERVAL HPI: Patient was seen and examined this morning.     CAPILLARY BLOOD GLUCOSE & INSULIN RECEIVED  180 mg/dL (12-30 @ 08:37)  374 mg/dL (12-30 @ 12:20)  204 mg/dL (12-30 @ 18:20)  153 mg/dL (12-30 @ 22:23)  130 mg/dL (12-31 @ 09:14)  233 mg/dL (12-31 @ 13:13)  173 mg/dL (12-31 @ 17:11)      REVIEW OF SYSTEMS  Constitutional:  Negative fever, chills or loss of appetite.  Eyes:  Negative blurry vision or double vision.  Cardiovascular:  Negative for chest pain or palpitations.  Respiratory:  Negative for cough, wheezing, or shortness of breath.    Gastrointestinal:  Negative for nausea, vomiting, diarrhea, constipation, or abdominal pain.  Genitourinary:  Negative frequency, urgency or dysuria.  Neurologic:  No headache, confusion, dizziness, lightheadedness.    PHYSICAL EXAM  Vital Signs Last 24 Hrs  T(C): 37.1 (31 Dec 2023 15:53), Max: 37.1 (30 Dec 2023 20:25)  T(F): 98.7 (31 Dec 2023 15:53), Max: 98.7 (30 Dec 2023 20:25)  HR: 74 (31 Dec 2023 15:53) (74 - 86)  BP: 125/77 (31 Dec 2023 15:53) (124/76 - 145/83)  BP(mean): 103 (30 Dec 2023 20:25) (103 - 103)  RR: 18 (31 Dec 2023 15:53) (17 - 18)  SpO2: 96% (31 Dec 2023 15:53) (96% - 98%)    Parameters below as of 31 Dec 2023 15:53  Patient On (Oxygen Delivery Method): room air        Constitutional: Awake, alert, in no acute distress.   HEENT: Normocephalic, atraumatic, NEDRA.  Respiratory: Lungs clear to ausculation bilaterally.   Cardiovascular: regular rhythm, normal S1 and S2, no audible murmurs.   GI: soft, non-tender, non-distended, bowel sounds present.  Extremities: No lower extremity edema.  Psychiatric: AAO x 3. Normal affect/mood.     LABS  CBC - WBC/HGB/HTC/PLT: 8.15/11.0/34.5/182 (12-31-23)  BMP - Na/K/Cl/Bicarb/BUN/Cr/Gluc/AG/eGFR: 136/4.2/103/24/25/1.43/145/9/38 (12-31-23)  Ca - 9.0 (12-31-23)  Phos - 3.5 (12-31-23)  Mg - 1.4 (12-31-23)  LFT - Alb/Tprot/Tbili/Dbili/AlkPhos/ALT/AST: 2.7/--/0.2/--/92/8/17 (12-31-23)    Thyroid Stimulating Hormone, Serum: 0.473 (11-16-23)      MEDICATIONS  MEDICATIONS  (STANDING):  amLODIPine   Tablet 10 milliGRAM(s) Oral daily  atorvastatin 40 milliGRAM(s) Oral at bedtime  cyanocobalamin 1000 MICROGram(s) Oral daily  dextrose 5%. 1000 milliLiter(s) (100 mL/Hr) IV Continuous <Continuous>  dextrose 5%. 1000 milliLiter(s) (50 mL/Hr) IV Continuous <Continuous>  dextrose 5%. 1000 milliLiter(s) (100 mL/Hr) IV Continuous <Continuous>  dextrose 5%. 1000 milliLiter(s) (50 mL/Hr) IV Continuous <Continuous>  dextrose 50% Injectable 25 Gram(s) IV Push once  dextrose 50% Injectable 12.5 Gram(s) IV Push once  dextrose 50% Injectable 25 Gram(s) IV Push once  dextrose 50% Injectable 25 Gram(s) IV Push once  dextrose 50% Injectable 25 Gram(s) IV Push once  dextrose 50% Injectable 12.5 Gram(s) IV Push once  folic acid 1 milliGRAM(s) Oral daily  glucagon  Injectable 1 milliGRAM(s) IntraMuscular once  glucagon  Injectable 1 milliGRAM(s) IntraMuscular once  heparin   Injectable 5000 Unit(s) SubCutaneous every 8 hours  hydroxychloroquine 200 milliGRAM(s) Oral two times a day  influenza  Vaccine (HIGH DOSE) 0.7 milliLiter(s) IntraMuscular once  insulin glargine Injectable (LANTUS) 22 Unit(s) SubCutaneous at bedtime  insulin lispro (ADMELOG) corrective regimen sliding scale   SubCutaneous Before meals and at bedtime  insulin lispro Injectable (ADMELOG) 8 Unit(s) SubCutaneous three times a day before meals  predniSONE   Tablet 5 milliGRAM(s) Oral daily  sulfaSALAzine 1000 milliGRAM(s) Oral two times a day    MEDICATIONS  (PRN):  acetaminophen     Tablet .. 650 milliGRAM(s) Oral every 6 hours PRN Temp greater or equal to 38C (100.4F), Mild Pain (1 - 3)  dextrose Oral Gel 15 Gram(s) Oral once PRN Blood Glucose LESS THAN 70 milliGRAM(s)/deciliter  dextrose Oral Gel 15 Gram(s) Oral once PRN Blood Glucose LESS THAN 70 milliGRAM(s)/deciliter    ASSESSMENT / RECOMMENDATIONS    A1C: 11.5 %  BUN: 25  Creatinine: 1.43  GFR: 38  Weight: 62.1  BMI: 24.3  EF:     # Type 2 diabetes mellitus with hyperglycemia  - Please continue lantus *** units at bedtime.   - Continue lispro *** units before each meal.  - Continue lispro moderate / low dose sliding scale before meals and at bedtime.  - Patient's fingerstick glucose goal is 100-180 mg/dL.    - Discharge recommendations to be discussed.   - Patient can follow up at discharge with [facility] by calling [phone] to make an appointment.      Case discussed with Dr. Burgess. Primary team updated.       Vishnu Lee    Endocrinology Fellow    Service Pager: [phone]    SUBJECTIVE / INTERVAL HPI: Patient was seen and examined this morning. Glucose levels have improved overnight after increasing long-acting insulin; however, she continues to experience postprandial glucose excursions at current premeal dose.     CAPILLARY BLOOD GLUCOSE & INSULIN RECEIVED  226 mg/dL (12-29 @ 21:49) -> 20 units of lantus + 4 units of LSS.    180 mg/dL (12-30 @ 08:37) -> 6 units of lispro + 2 units of lispro sliding scale.   374 mg/dL (12-30 @ 12:20) -> 6 units of lispro + 10 units of lispro sliding scale.   204 mg/dL (12-30 @ 18:20) -> 6 units of lispro + 4 units lispro sliding scale.   153 mg/dL (12-30 @ 22:23) -> 22 units of lantus + 2 units of lispro sliding scale.   130 mg/dL (12-31 @ 09:14) -> 8 units of lispro.    233 mg/dL (12-31 @ 13:13)    REVIEW OF SYSTEMS  Constitutional:  Negative fever, chills or loss of appetite.  Cardiovascular:  Negative for chest pain or palpitations.  Respiratory:  Negative for cough, wheezing, or shortness of breath.    Gastrointestinal:  Negative for nausea, vomiting, diarrhea, constipation, or abdominal pain.  Neurologic:  No headache, confusion, dizziness, lightheadedness.    PHYSICAL EXAM  Vital Signs Last 24 Hrs  T(C): 37.1 (31 Dec 2023 15:53), Max: 37.1 (30 Dec 2023 20:25)  T(F): 98.7 (31 Dec 2023 15:53), Max: 98.7 (30 Dec 2023 20:25)  HR: 74 (31 Dec 2023 15:53) (74 - 86)  BP: 125/77 (31 Dec 2023 15:53) (124/76 - 145/83)  BP(mean): 103 (30 Dec 2023 20:25) (103 - 103)  RR: 18 (31 Dec 2023 15:53) (17 - 18)  SpO2: 96% (31 Dec 2023 15:53) (96% - 98%)    Parameters below as of 31 Dec 2023 15:53  Patient On (Oxygen Delivery Method): room air    Constitutional: Awake, alert, in no acute distress.   HEENT: Normocephalic, atraumatic, NEDRA.  Respiratory: Lungs clear to ausculation bilaterally.   Cardiovascular: regular rhythm, normal S1 and S2, no audible murmurs.   GI: soft, non-tender, non-distended, bowel sounds present.  Extremities: No lower extremity edema.  Psychiatric: AAO x 3.     LABS  CBC - WBC/HGB/HTC/PLT: 8.15/11.0/34.5/182 (12-31-23)  BMP - Na/K/Cl/Bicarb/BUN/Cr/Gluc/AG/eGFR: 136/4.2/103/24/25/1.43/145/9/38 (12-31-23)  Ca - 9.0 (12-31-23)  Phos - 3.5 (12-31-23)  Mg - 1.4 (12-31-23)  LFT - Alb/Tprot/Tbili/Dbili/AlkPhos/ALT/AST: 2.7/--/0.2/--/92/8/17 (12-31-23)  Thyroid Stimulating Hormone, Serum: 0.473 (11-16-23)    MEDICATIONS  MEDICATIONS  (STANDING):  amLODIPine   Tablet 10 milliGRAM(s) Oral daily  atorvastatin 40 milliGRAM(s) Oral at bedtime  cyanocobalamin 1000 MICROGram(s) Oral daily  dextrose 5%. 1000 milliLiter(s) (100 mL/Hr) IV Continuous <Continuous>  dextrose 5%. 1000 milliLiter(s) (50 mL/Hr) IV Continuous <Continuous>  dextrose 5%. 1000 milliLiter(s) (100 mL/Hr) IV Continuous <Continuous>  dextrose 5%. 1000 milliLiter(s) (50 mL/Hr) IV Continuous <Continuous>  dextrose 50% Injectable 25 Gram(s) IV Push once  dextrose 50% Injectable 12.5 Gram(s) IV Push once  dextrose 50% Injectable 25 Gram(s) IV Push once  dextrose 50% Injectable 25 Gram(s) IV Push once  dextrose 50% Injectable 25 Gram(s) IV Push once  dextrose 50% Injectable 12.5 Gram(s) IV Push once  folic acid 1 milliGRAM(s) Oral daily  glucagon  Injectable 1 milliGRAM(s) IntraMuscular once  glucagon  Injectable 1 milliGRAM(s) IntraMuscular once  heparin   Injectable 5000 Unit(s) SubCutaneous every 8 hours  hydroxychloroquine 200 milliGRAM(s) Oral two times a day  influenza  Vaccine (HIGH DOSE) 0.7 milliLiter(s) IntraMuscular once  insulin glargine Injectable (LANTUS) 22 Unit(s) SubCutaneous at bedtime  insulin lispro (ADMELOG) corrective regimen sliding scale   SubCutaneous Before meals and at bedtime  insulin lispro Injectable (ADMELOG) 8 Unit(s) SubCutaneous three times a day before meals  predniSONE   Tablet 5 milliGRAM(s) Oral daily  sulfaSALAzine 1000 milliGRAM(s) Oral two times a day    MEDICATIONS  (PRN):  acetaminophen     Tablet .. 650 milliGRAM(s) Oral every 6 hours PRN Temp greater or equal to 38C (100.4F), Mild Pain (1 - 3)  dextrose Oral Gel 15 Gram(s) Oral once PRN Blood Glucose LESS THAN 70 milliGRAM(s)/deciliter  dextrose Oral Gel 15 Gram(s) Oral once PRN Blood Glucose LESS THAN 70 milliGRAM(s)/deciliter    ASSESSMENT / RECOMMENDATIONS  Ms. Minor is a 73-year-old female with a past medical history of type 2 diabetes mellitus, hypertension, hyperlipidemia, chronic kidney disease 3a, NSCLC adenocarcinoma with mets to brain (s/p RT x1 07/2023, s/p chemo x2 (last tx 11/16/23), rheumatoid arthritis and right hip replacement who presented to [facility] on 12/26/23 with persist dysuria and polyuria. Labs were also significant for (+) influenza. She was for further management of urinary tract infection and influenza. Endocrinology was consulted for recommendations regarding management of diabetes.     A1C: 11.5 %  BUN: 25  Creatinine: 1.43  GFR: 38  Weight: 62.1  BMI: 24.3    # Type 2 diabetes mellitus with hyperglycemia  - She remains on prednisone 5 mg daily.   - Glucose levels have improved overnight after increasing long-acting insulin; however, she continues to experience postprandial glucose excursions at current premeal dose.   - Please continue lantus to 22 units at bedtime.   - INCREASE lispro to 10 units before each meal.  - Continue lispro moderate dose sliding scale before meals and at bedtime.  - Patient's fingerstick glucose goal is 100-180 mg/dL.    - Discharge recommendations to be discussed.   - Patient can follow up at discharge with [facility] by calling [phone] to make an appointment.      Case discussed with Dr. Burgess. Primary team updated.       Vishnu Lee    Endocrinology Fellow    Service Pager: [phone]    SUBJECTIVE / INTERVAL HPI: Patient was seen and examined this morning. Glucose levels have improved overnight after increasing long-acting insulin; however, she continues to experience postprandial glucose excursions at current premeal dose.     CAPILLARY BLOOD GLUCOSE & INSULIN RECEIVED  226 mg/dL (12-29 @ 21:49) -> 20 units of lantus + 4 units of LSS.    180 mg/dL (12-30 @ 08:37) -> 6 units of lispro + 2 units of lispro sliding scale.   374 mg/dL (12-30 @ 12:20) -> 6 units of lispro + 10 units of lispro sliding scale.   204 mg/dL (12-30 @ 18:20) -> 6 units of lispro + 4 units lispro sliding scale.   153 mg/dL (12-30 @ 22:23) -> 22 units of lantus + 2 units of lispro sliding scale.   130 mg/dL (12-31 @ 09:14) -> 8 units of lispro.    233 mg/dL (12-31 @ 13:13)    REVIEW OF SYSTEMS  Constitutional:  Negative fever, chills or loss of appetite.  Cardiovascular:  Negative for chest pain or palpitations.  Respiratory:  Negative for cough, wheezing, or shortness of breath.    Gastrointestinal:  Negative for nausea, vomiting, diarrhea, constipation, or abdominal pain.  Neurologic:  No headache, confusion, dizziness, lightheadedness.    PHYSICAL EXAM  Vital Signs Last 24 Hrs  T(C): 37.1 (31 Dec 2023 15:53), Max: 37.1 (30 Dec 2023 20:25)  T(F): 98.7 (31 Dec 2023 15:53), Max: 98.7 (30 Dec 2023 20:25)  HR: 74 (31 Dec 2023 15:53) (74 - 86)  BP: 125/77 (31 Dec 2023 15:53) (124/76 - 145/83)  BP(mean): 103 (30 Dec 2023 20:25) (103 - 103)  RR: 18 (31 Dec 2023 15:53) (17 - 18)  SpO2: 96% (31 Dec 2023 15:53) (96% - 98%)    Parameters below as of 31 Dec 2023 15:53  Patient On (Oxygen Delivery Method): room air    Constitutional: Awake, alert, in no acute distress.   HEENT: Normocephalic, atraumatic, NEDRA.  Respiratory: Lungs clear to ausculation bilaterally.   Cardiovascular: regular rhythm, normal S1 and S2, no audible murmurs.   GI: soft, non-tender, non-distended, bowel sounds present.  Extremities: No lower extremity edema.  Psychiatric: AAO x 3.     LABS  CBC - WBC/HGB/HTC/PLT: 8.15/11.0/34.5/182 (12-31-23)  BMP - Na/K/Cl/Bicarb/BUN/Cr/Gluc/AG/eGFR: 136/4.2/103/24/25/1.43/145/9/38 (12-31-23)  Ca - 9.0 (12-31-23)  Phos - 3.5 (12-31-23)  Mg - 1.4 (12-31-23)  LFT - Alb/Tprot/Tbili/Dbili/AlkPhos/ALT/AST: 2.7/--/0.2/--/92/8/17 (12-31-23)  Thyroid Stimulating Hormone, Serum: 0.473 (11-16-23)    MEDICATIONS  MEDICATIONS  (STANDING):  amLODIPine   Tablet 10 milliGRAM(s) Oral daily  atorvastatin 40 milliGRAM(s) Oral at bedtime  cyanocobalamin 1000 MICROGram(s) Oral daily  dextrose 5%. 1000 milliLiter(s) (100 mL/Hr) IV Continuous <Continuous>  dextrose 5%. 1000 milliLiter(s) (50 mL/Hr) IV Continuous <Continuous>  dextrose 5%. 1000 milliLiter(s) (100 mL/Hr) IV Continuous <Continuous>  dextrose 5%. 1000 milliLiter(s) (50 mL/Hr) IV Continuous <Continuous>  dextrose 50% Injectable 25 Gram(s) IV Push once  dextrose 50% Injectable 12.5 Gram(s) IV Push once  dextrose 50% Injectable 25 Gram(s) IV Push once  dextrose 50% Injectable 25 Gram(s) IV Push once  dextrose 50% Injectable 25 Gram(s) IV Push once  dextrose 50% Injectable 12.5 Gram(s) IV Push once  folic acid 1 milliGRAM(s) Oral daily  glucagon  Injectable 1 milliGRAM(s) IntraMuscular once  glucagon  Injectable 1 milliGRAM(s) IntraMuscular once  heparin   Injectable 5000 Unit(s) SubCutaneous every 8 hours  hydroxychloroquine 200 milliGRAM(s) Oral two times a day  influenza  Vaccine (HIGH DOSE) 0.7 milliLiter(s) IntraMuscular once  insulin glargine Injectable (LANTUS) 22 Unit(s) SubCutaneous at bedtime  insulin lispro (ADMELOG) corrective regimen sliding scale   SubCutaneous Before meals and at bedtime  insulin lispro Injectable (ADMELOG) 8 Unit(s) SubCutaneous three times a day before meals  predniSONE   Tablet 5 milliGRAM(s) Oral daily  sulfaSALAzine 1000 milliGRAM(s) Oral two times a day    MEDICATIONS  (PRN):  acetaminophen     Tablet .. 650 milliGRAM(s) Oral every 6 hours PRN Temp greater or equal to 38C (100.4F), Mild Pain (1 - 3)  dextrose Oral Gel 15 Gram(s) Oral once PRN Blood Glucose LESS THAN 70 milliGRAM(s)/deciliter  dextrose Oral Gel 15 Gram(s) Oral once PRN Blood Glucose LESS THAN 70 milliGRAM(s)/deciliter    ASSESSMENT / RECOMMENDATIONS  Ms. Minor is a 73-year-old female with a past medical history of type 2 diabetes mellitus, hypertension, hyperlipidemia, chronic kidney disease 3a, NSCLC adenocarcinoma with mets to brain (s/p RT x1 07/2023, s/p chemo x2 (last tx 11/16/23), rheumatoid arthritis and right hip replacement who presented to [facility] on 12/26/23 with persist dysuria and polyuria. Labs were also significant for (+) influenza. She was for further management of urinary tract infection and influenza. Endocrinology was consulted for recommendations regarding management of diabetes.     A1C: 11.5 %  BUN: 25  Creatinine: 1.43  GFR: 38  Weight: 62.1  BMI: 24.3    # Type 2 diabetes mellitus with hyperglycemia  - She remains on prednisone 5 mg daily.   - Glucose levels have improved overnight after increasing long-acting insulin; however, she continues to experience postprandial glucose excursions at current premeal dose.   - Please continue lantus to 22 units at bedtime.   - INCREASE lispro to 10 units before each meal.  - Continue lispro moderate dose sliding scale before meals and at bedtime.  - Patient's fingerstick glucose goal is 100-180 mg/dL.    - Discharge recommendations to be discussed.   - Patient can follow up at discharge with [facility] by calling [phone] to make an appointment.      Case discussed with Dr. Burgess. Primary team updated.       Vishnu Lee    Endocrinology Fellow    Service Pager: [phone]

## 2024-01-01 LAB
ANION GAP SERPL CALC-SCNC: 9 MMOL/L — SIGNIFICANT CHANGE UP (ref 5–17)
ANION GAP SERPL CALC-SCNC: 9 MMOL/L — SIGNIFICANT CHANGE UP (ref 5–17)
BASOPHILS # BLD AUTO: 0.07 K/UL — SIGNIFICANT CHANGE UP (ref 0–0.2)
BASOPHILS # BLD AUTO: 0.07 K/UL — SIGNIFICANT CHANGE UP (ref 0–0.2)
BASOPHILS NFR BLD AUTO: 0.8 % — SIGNIFICANT CHANGE UP (ref 0–2)
BASOPHILS NFR BLD AUTO: 0.8 % — SIGNIFICANT CHANGE UP (ref 0–2)
BUN SERPL-MCNC: 24 MG/DL — HIGH (ref 7–23)
BUN SERPL-MCNC: 24 MG/DL — HIGH (ref 7–23)
CALCIUM SERPL-MCNC: 8.9 MG/DL — SIGNIFICANT CHANGE UP (ref 8.4–10.5)
CALCIUM SERPL-MCNC: 8.9 MG/DL — SIGNIFICANT CHANGE UP (ref 8.4–10.5)
CHLORIDE SERPL-SCNC: 100 MMOL/L — SIGNIFICANT CHANGE UP (ref 96–108)
CHLORIDE SERPL-SCNC: 100 MMOL/L — SIGNIFICANT CHANGE UP (ref 96–108)
CO2 SERPL-SCNC: 24 MMOL/L — SIGNIFICANT CHANGE UP (ref 22–31)
CO2 SERPL-SCNC: 24 MMOL/L — SIGNIFICANT CHANGE UP (ref 22–31)
CREAT SERPL-MCNC: 1.25 MG/DL — SIGNIFICANT CHANGE UP (ref 0.5–1.3)
CREAT SERPL-MCNC: 1.25 MG/DL — SIGNIFICANT CHANGE UP (ref 0.5–1.3)
EGFR: 45 ML/MIN/1.73M2 — LOW
EGFR: 45 ML/MIN/1.73M2 — LOW
EOSINOPHIL # BLD AUTO: 0.1 K/UL — SIGNIFICANT CHANGE UP (ref 0–0.5)
EOSINOPHIL # BLD AUTO: 0.1 K/UL — SIGNIFICANT CHANGE UP (ref 0–0.5)
EOSINOPHIL NFR BLD AUTO: 1.2 % — SIGNIFICANT CHANGE UP (ref 0–6)
EOSINOPHIL NFR BLD AUTO: 1.2 % — SIGNIFICANT CHANGE UP (ref 0–6)
GLUCOSE BLDC GLUCOMTR-MCNC: 127 MG/DL — HIGH (ref 70–99)
GLUCOSE BLDC GLUCOMTR-MCNC: 127 MG/DL — HIGH (ref 70–99)
GLUCOSE BLDC GLUCOMTR-MCNC: 135 MG/DL — HIGH (ref 70–99)
GLUCOSE BLDC GLUCOMTR-MCNC: 135 MG/DL — HIGH (ref 70–99)
GLUCOSE BLDC GLUCOMTR-MCNC: 139 MG/DL — HIGH (ref 70–99)
GLUCOSE BLDC GLUCOMTR-MCNC: 139 MG/DL — HIGH (ref 70–99)
GLUCOSE BLDC GLUCOMTR-MCNC: 182 MG/DL — HIGH (ref 70–99)
GLUCOSE BLDC GLUCOMTR-MCNC: 182 MG/DL — HIGH (ref 70–99)
GLUCOSE SERPL-MCNC: 136 MG/DL — HIGH (ref 70–99)
GLUCOSE SERPL-MCNC: 136 MG/DL — HIGH (ref 70–99)
HCT VFR BLD CALC: 34.1 % — LOW (ref 34.5–45)
HCT VFR BLD CALC: 34.1 % — LOW (ref 34.5–45)
HGB BLD-MCNC: 10.7 G/DL — LOW (ref 11.5–15.5)
HGB BLD-MCNC: 10.7 G/DL — LOW (ref 11.5–15.5)
IMM GRANULOCYTES NFR BLD AUTO: 1.5 % — HIGH (ref 0–0.9)
IMM GRANULOCYTES NFR BLD AUTO: 1.5 % — HIGH (ref 0–0.9)
LYMPHOCYTES # BLD AUTO: 2.06 K/UL — SIGNIFICANT CHANGE UP (ref 1–3.3)
LYMPHOCYTES # BLD AUTO: 2.06 K/UL — SIGNIFICANT CHANGE UP (ref 1–3.3)
LYMPHOCYTES # BLD AUTO: 24.5 % — SIGNIFICANT CHANGE UP (ref 13–44)
LYMPHOCYTES # BLD AUTO: 24.5 % — SIGNIFICANT CHANGE UP (ref 13–44)
MAGNESIUM SERPL-MCNC: 2.1 MG/DL — SIGNIFICANT CHANGE UP (ref 1.6–2.6)
MAGNESIUM SERPL-MCNC: 2.1 MG/DL — SIGNIFICANT CHANGE UP (ref 1.6–2.6)
MCHC RBC-ENTMCNC: 29.2 PG — SIGNIFICANT CHANGE UP (ref 27–34)
MCHC RBC-ENTMCNC: 29.2 PG — SIGNIFICANT CHANGE UP (ref 27–34)
MCHC RBC-ENTMCNC: 31.4 GM/DL — LOW (ref 32–36)
MCHC RBC-ENTMCNC: 31.4 GM/DL — LOW (ref 32–36)
MCV RBC AUTO: 93.2 FL — SIGNIFICANT CHANGE UP (ref 80–100)
MCV RBC AUTO: 93.2 FL — SIGNIFICANT CHANGE UP (ref 80–100)
MONOCYTES # BLD AUTO: 0.8 K/UL — SIGNIFICANT CHANGE UP (ref 0–0.9)
MONOCYTES # BLD AUTO: 0.8 K/UL — SIGNIFICANT CHANGE UP (ref 0–0.9)
MONOCYTES NFR BLD AUTO: 9.5 % — SIGNIFICANT CHANGE UP (ref 2–14)
MONOCYTES NFR BLD AUTO: 9.5 % — SIGNIFICANT CHANGE UP (ref 2–14)
NEUTROPHILS # BLD AUTO: 5.25 K/UL — SIGNIFICANT CHANGE UP (ref 1.8–7.4)
NEUTROPHILS # BLD AUTO: 5.25 K/UL — SIGNIFICANT CHANGE UP (ref 1.8–7.4)
NEUTROPHILS NFR BLD AUTO: 62.5 % — SIGNIFICANT CHANGE UP (ref 43–77)
NEUTROPHILS NFR BLD AUTO: 62.5 % — SIGNIFICANT CHANGE UP (ref 43–77)
NRBC # BLD: 0 /100 WBCS — SIGNIFICANT CHANGE UP (ref 0–0)
NRBC # BLD: 0 /100 WBCS — SIGNIFICANT CHANGE UP (ref 0–0)
PHOSPHATE SERPL-MCNC: 3.5 MG/DL — SIGNIFICANT CHANGE UP (ref 2.5–4.5)
PHOSPHATE SERPL-MCNC: 3.5 MG/DL — SIGNIFICANT CHANGE UP (ref 2.5–4.5)
PLATELET # BLD AUTO: 191 K/UL — SIGNIFICANT CHANGE UP (ref 150–400)
PLATELET # BLD AUTO: 191 K/UL — SIGNIFICANT CHANGE UP (ref 150–400)
POTASSIUM SERPL-MCNC: 4 MMOL/L — SIGNIFICANT CHANGE UP (ref 3.5–5.3)
POTASSIUM SERPL-MCNC: 4 MMOL/L — SIGNIFICANT CHANGE UP (ref 3.5–5.3)
POTASSIUM SERPL-SCNC: 4 MMOL/L — SIGNIFICANT CHANGE UP (ref 3.5–5.3)
POTASSIUM SERPL-SCNC: 4 MMOL/L — SIGNIFICANT CHANGE UP (ref 3.5–5.3)
RBC # BLD: 3.66 M/UL — LOW (ref 3.8–5.2)
RBC # BLD: 3.66 M/UL — LOW (ref 3.8–5.2)
RBC # FLD: 14.7 % — HIGH (ref 10.3–14.5)
RBC # FLD: 14.7 % — HIGH (ref 10.3–14.5)
SODIUM SERPL-SCNC: 133 MMOL/L — LOW (ref 135–145)
SODIUM SERPL-SCNC: 133 MMOL/L — LOW (ref 135–145)
WBC # BLD: 8.41 K/UL — SIGNIFICANT CHANGE UP (ref 3.8–10.5)
WBC # BLD: 8.41 K/UL — SIGNIFICANT CHANGE UP (ref 3.8–10.5)
WBC # FLD AUTO: 8.41 K/UL — SIGNIFICANT CHANGE UP (ref 3.8–10.5)
WBC # FLD AUTO: 8.41 K/UL — SIGNIFICANT CHANGE UP (ref 3.8–10.5)

## 2024-01-01 PROCEDURE — 99232 SBSQ HOSP IP/OBS MODERATE 35: CPT | Mod: GC

## 2024-01-01 RX ADMIN — Medication 1000 MILLIGRAM(S): at 17:51

## 2024-01-01 RX ADMIN — Medication 200 MILLIGRAM(S): at 06:14

## 2024-01-01 RX ADMIN — Medication 200 MILLIGRAM(S): at 17:45

## 2024-01-01 RX ADMIN — Medication 10 UNIT(S): at 13:30

## 2024-01-01 RX ADMIN — AMLODIPINE BESYLATE 10 MILLIGRAM(S): 2.5 TABLET ORAL at 06:15

## 2024-01-01 RX ADMIN — Medication 10 UNIT(S): at 09:27

## 2024-01-01 RX ADMIN — INSULIN GLARGINE 22 UNIT(S): 100 INJECTION, SOLUTION SUBCUTANEOUS at 22:11

## 2024-01-01 RX ADMIN — Medication 10 UNIT(S): at 18:13

## 2024-01-01 RX ADMIN — Medication 1000 MILLIGRAM(S): at 06:15

## 2024-01-01 RX ADMIN — Medication 1 MILLIGRAM(S): at 12:04

## 2024-01-01 RX ADMIN — HEPARIN SODIUM 5000 UNIT(S): 5000 INJECTION INTRAVENOUS; SUBCUTANEOUS at 14:16

## 2024-01-01 RX ADMIN — Medication 2: at 13:29

## 2024-01-01 RX ADMIN — HEPARIN SODIUM 5000 UNIT(S): 5000 INJECTION INTRAVENOUS; SUBCUTANEOUS at 22:12

## 2024-01-01 RX ADMIN — ATORVASTATIN CALCIUM 40 MILLIGRAM(S): 80 TABLET, FILM COATED ORAL at 22:12

## 2024-01-01 RX ADMIN — HEPARIN SODIUM 5000 UNIT(S): 5000 INJECTION INTRAVENOUS; SUBCUTANEOUS at 06:15

## 2024-01-01 RX ADMIN — Medication 5 MILLIGRAM(S): at 06:14

## 2024-01-01 RX ADMIN — PREGABALIN 1000 MICROGRAM(S): 225 CAPSULE ORAL at 12:04

## 2024-01-01 NOTE — PROGRESS NOTE ADULT - SUBJECTIVE AND OBJECTIVE BOX
**INCOMPLETE NOTE    OVERNIGHT EVENTS:    SUBJECTIVE:  Patient seen and examined at bedside.  ROS: Patient denies h/n/v/d, fever, chills, cp, palpitations, sob, abd pain, leg swelling, rashes, dysuria, and changes in BM.     Vital Signs Last 12 Hrs  T(F): 98.1 (01-01-24 @ 12:07), Max: 98.5 (01-01-24 @ 05:55)  HR: 73 (01-01-24 @ 12:07) (65 - 73)  BP: 127/69 (01-01-24 @ 12:07) (127/69 - 132/86)  BP(mean): --  RR: 17 (01-01-24 @ 12:07) (16 - 17)  SpO2: 97% (01-01-24 @ 12:07) (97% - 99%)  I&O's Summary    31 Dec 2023 07:01  -  01 Jan 2024 07:00  --------------------------------------------------------  IN: 0 mL / OUT: 1200 mL / NET: -1200 mL        PHYSICAL EXAM:  Constitutional: NAD, comfortable in bed.  HEENT: NC/AT, PERRLA, EOMI, no conjunctival pallor or scleral icterus, MMM  Neck: Supple, no JVD  Respiratory: CTA B/L. No w/r/r.   Cardiovascular: RRR, normal S1 and S2, no m/r/g.   Gastrointestinal: +BS, soft NTND, no guarding or rebound tenderness, no palpable masses   Extremities: wwp; no cyanosis, clubbing or edema.   Vascular: Pulses equal and strong throughout.   Neurological: AAOx3, no CN deficits, strength and sensation intact throughout.   Skin: No gross skin abnormalities or rashes        LABS:                        10.7   8.41  )-----------( 191      ( 01 Jan 2024 05:30 )             34.1     01-01    133<L>  |  100  |  24<H>  ----------------------------<  136<H>  4.0   |  24  |  1.25    Ca    8.9      01 Jan 2024 05:30  Phos  3.5     01-01  Mg     2.1     01-01    TPro  5.9<L>  /  Alb  2.7<L>  /  TBili  0.2  /  DBili  x   /  AST  17  /  ALT  8<L>  /  AlkPhos  92  12-31      Urinalysis Basic - ( 01 Jan 2024 05:30 )    Color: x / Appearance: x / SG: x / pH: x  Gluc: 136 mg/dL / Ketone: x  / Bili: x / Urobili: x   Blood: x / Protein: x / Nitrite: x   Leuk Esterase: x / RBC: x / WBC x   Sq Epi: x / Non Sq Epi: x / Bacteria: x          RADIOLOGY & ADDITIONAL TESTS:    MEDICATIONS  (STANDING):  amLODIPine   Tablet 10 milliGRAM(s) Oral daily  atorvastatin 40 milliGRAM(s) Oral at bedtime  cyanocobalamin 1000 MICROGram(s) Oral daily  dextrose 5%. 1000 milliLiter(s) (100 mL/Hr) IV Continuous <Continuous>  dextrose 5%. 1000 milliLiter(s) (50 mL/Hr) IV Continuous <Continuous>  dextrose 5%. 1000 milliLiter(s) (50 mL/Hr) IV Continuous <Continuous>  dextrose 5%. 1000 milliLiter(s) (100 mL/Hr) IV Continuous <Continuous>  dextrose 50% Injectable 25 Gram(s) IV Push once  dextrose 50% Injectable 12.5 Gram(s) IV Push once  dextrose 50% Injectable 25 Gram(s) IV Push once  dextrose 50% Injectable 25 Gram(s) IV Push once  dextrose 50% Injectable 25 Gram(s) IV Push once  dextrose 50% Injectable 12.5 Gram(s) IV Push once  folic acid 1 milliGRAM(s) Oral daily  glucagon  Injectable 1 milliGRAM(s) IntraMuscular once  glucagon  Injectable 1 milliGRAM(s) IntraMuscular once  heparin   Injectable 5000 Unit(s) SubCutaneous every 8 hours  hydroxychloroquine 200 milliGRAM(s) Oral two times a day  influenza  Vaccine (HIGH DOSE) 0.7 milliLiter(s) IntraMuscular once  insulin glargine Injectable (LANTUS) 22 Unit(s) SubCutaneous at bedtime  insulin lispro (ADMELOG) corrective regimen sliding scale   SubCutaneous Before meals and at bedtime  insulin lispro Injectable (ADMELOG) 10 Unit(s) SubCutaneous three times a day before meals  predniSONE   Tablet 5 milliGRAM(s) Oral daily  sulfaSALAzine 1000 milliGRAM(s) Oral two times a day    MEDICATIONS  (PRN):  acetaminophen     Tablet .. 650 milliGRAM(s) Oral every 6 hours PRN Temp greater or equal to 38C (100.4F), Mild Pain (1 - 3)  dextrose Oral Gel 15 Gram(s) Oral once PRN Blood Glucose LESS THAN 70 milliGRAM(s)/deciliter  dextrose Oral Gel 15 Gram(s) Oral once PRN Blood Glucose LESS THAN 70 milliGRAM(s)/deciliter   OVERNIGHT EVENTS: CYNDY     SUBJECTIVE:  Patient seen and examined at bedside. NAD says she is feeling much better   ROS: Patient denies h/n/v/d, fever, chills, cp, palpitations, sob, abd pain, leg swelling, rashes, dysuria, and changes in BM.     Vital Signs Last 12 Hrs  T(F): 98.1 (01-01-24 @ 12:07), Max: 98.5 (01-01-24 @ 05:55)  HR: 73 (01-01-24 @ 12:07) (65 - 73)  BP: 127/69 (01-01-24 @ 12:07) (127/69 - 132/86)  BP(mean): --  RR: 17 (01-01-24 @ 12:07) (16 - 17)  SpO2: 97% (01-01-24 @ 12:07) (97% - 99%)  I&O's Summary    31 Dec 2023 07:01  -  01 Jan 2024 07:00  --------------------------------------------------------  IN: 0 mL / OUT: 1200 mL / NET: -1200 mL        PHYSICAL EXAM:  Constitutional: NAD, comfortable in bed.  HEENT: NC/AT, PERRLA, EOMI, no conjunctival pallor or scleral icterus, MMM  Neck: Supple, no JVD  Respiratory: CTA B/L. No w/r/r.   Cardiovascular: RRR, normal S1 and S2, no m/r/g.   Gastrointestinal: +BS, soft NTND, no guarding or rebound tenderness, no palpable masses   Extremities: wwp; no cyanosis, clubbing or edema.   Vascular: Pulses equal and strong throughout.   Neurological: AAOx3, no CN deficits, strength and sensation intact throughout.   Skin: No gross skin abnormalities or rashes        LABS:                        10.7   8.41  )-----------( 191      ( 01 Jan 2024 05:30 )             34.1     01-01    133<L>  |  100  |  24<H>  ----------------------------<  136<H>  4.0   |  24  |  1.25    Ca    8.9      01 Jan 2024 05:30  Phos  3.5     01-01  Mg     2.1     01-01    TPro  5.9<L>  /  Alb  2.7<L>  /  TBili  0.2  /  DBili  x   /  AST  17  /  ALT  8<L>  /  AlkPhos  92  12-31      Urinalysis Basic - ( 01 Jan 2024 05:30 )    Color: x / Appearance: x / SG: x / pH: x  Gluc: 136 mg/dL / Ketone: x  / Bili: x / Urobili: x   Blood: x / Protein: x / Nitrite: x   Leuk Esterase: x / RBC: x / WBC x   Sq Epi: x / Non Sq Epi: x / Bacteria: x          RADIOLOGY & ADDITIONAL TESTS:    MEDICATIONS  (STANDING):  amLODIPine   Tablet 10 milliGRAM(s) Oral daily  atorvastatin 40 milliGRAM(s) Oral at bedtime  cyanocobalamin 1000 MICROGram(s) Oral daily  dextrose 5%. 1000 milliLiter(s) (100 mL/Hr) IV Continuous <Continuous>  dextrose 5%. 1000 milliLiter(s) (50 mL/Hr) IV Continuous <Continuous>  dextrose 5%. 1000 milliLiter(s) (50 mL/Hr) IV Continuous <Continuous>  dextrose 5%. 1000 milliLiter(s) (100 mL/Hr) IV Continuous <Continuous>  dextrose 50% Injectable 25 Gram(s) IV Push once  dextrose 50% Injectable 12.5 Gram(s) IV Push once  dextrose 50% Injectable 25 Gram(s) IV Push once  dextrose 50% Injectable 25 Gram(s) IV Push once  dextrose 50% Injectable 25 Gram(s) IV Push once  dextrose 50% Injectable 12.5 Gram(s) IV Push once  folic acid 1 milliGRAM(s) Oral daily  glucagon  Injectable 1 milliGRAM(s) IntraMuscular once  glucagon  Injectable 1 milliGRAM(s) IntraMuscular once  heparin   Injectable 5000 Unit(s) SubCutaneous every 8 hours  hydroxychloroquine 200 milliGRAM(s) Oral two times a day  influenza  Vaccine (HIGH DOSE) 0.7 milliLiter(s) IntraMuscular once  insulin glargine Injectable (LANTUS) 22 Unit(s) SubCutaneous at bedtime  insulin lispro (ADMELOG) corrective regimen sliding scale   SubCutaneous Before meals and at bedtime  insulin lispro Injectable (ADMELOG) 10 Unit(s) SubCutaneous three times a day before meals  predniSONE   Tablet 5 milliGRAM(s) Oral daily  sulfaSALAzine 1000 milliGRAM(s) Oral two times a day    MEDICATIONS  (PRN):  acetaminophen     Tablet .. 650 milliGRAM(s) Oral every 6 hours PRN Temp greater or equal to 38C (100.4F), Mild Pain (1 - 3)  dextrose Oral Gel 15 Gram(s) Oral once PRN Blood Glucose LESS THAN 70 milliGRAM(s)/deciliter  dextrose Oral Gel 15 Gram(s) Oral once PRN Blood Glucose LESS THAN 70 milliGRAM(s)/deciliter

## 2024-01-01 NOTE — PROGRESS NOTE ADULT - PROBLEM SELECTOR PLAN 3
Reports home . In ED,  Bhb negative. Took prednisone 5mg for the last two days  Home meds lantus 17U, lispro 5U before meals, januvia 100mg. A1c 11.5  s/p 5U humulin in ED. Fingersticks within goal range   - c/w lantus 22u and 10 premeal   - Continue lispro moderate dose sliding scale before meals and at bedtime.  - Patient's fingerstick glucose goal is 100-180 mg/dL.

## 2024-01-01 NOTE — PROGRESS NOTE ADULT - PROBLEM SELECTOR PLAN 1
Presenting with persistent dysuria and polyuria  Presented to [facility] ED 11/16 for UTI discharged on cefpodoxime x 14 days which pt completed  Ucx 11/16 and 12/14 showed 100k  klebsiella oxytoca/raoutella ornithinolytica resistant to CTX. Was taking farxiga, prednisone 5mg   Afebrile, not septic  Reports  - s/p ertapanem   - hold farxiga iso UTI Presenting with persistent dysuria and polyuria  Presented to [facility] ED 11/16 for UTI discharged on cefpodoxime x 14 days which pt completed  Ucx 11/16 and 12/14 showed 100k  klebsiella oxytoca/raoutella ornithinolytica resistant to CTX. Was taking farxiga, prednisone 5mg   Afebrile, not septic  Reports  - s/p ertapanem   - hold farxiga iso UTI

## 2024-01-01 NOTE — PROGRESS NOTE ADULT - PROBLEM SELECTOR PLAN 1
Workup for falls with potential brain mass etiology- see below.   - ASA, Plavix, and Atorvastatin discontinued to prevent ICH  - q6hr neuro checks and vitals  - if altered/concerning, consider seizure given vasogenic edema  - eventual outpatient neurology follow up    - Stroke Code HCT Results: age indeterminate L cerebellar lacunar infarcts  - Stroke Code CTA Results: deferred due to CKD  - Stroke education. Statement Selected

## 2024-01-02 ENCOUNTER — TRANSCRIPTION ENCOUNTER (OUTPATIENT)
Age: 74
End: 2024-01-02

## 2024-01-02 LAB
ANION GAP SERPL CALC-SCNC: 9 MMOL/L — SIGNIFICANT CHANGE UP (ref 5–17)
ANION GAP SERPL CALC-SCNC: 9 MMOL/L — SIGNIFICANT CHANGE UP (ref 5–17)
BUN SERPL-MCNC: 21 MG/DL — SIGNIFICANT CHANGE UP (ref 7–23)
BUN SERPL-MCNC: 21 MG/DL — SIGNIFICANT CHANGE UP (ref 7–23)
CALCIUM SERPL-MCNC: 8.8 MG/DL — SIGNIFICANT CHANGE UP (ref 8.4–10.5)
CALCIUM SERPL-MCNC: 8.8 MG/DL — SIGNIFICANT CHANGE UP (ref 8.4–10.5)
CHLORIDE SERPL-SCNC: 104 MMOL/L — SIGNIFICANT CHANGE UP (ref 96–108)
CHLORIDE SERPL-SCNC: 104 MMOL/L — SIGNIFICANT CHANGE UP (ref 96–108)
CO2 SERPL-SCNC: 22 MMOL/L — SIGNIFICANT CHANGE UP (ref 22–31)
CO2 SERPL-SCNC: 22 MMOL/L — SIGNIFICANT CHANGE UP (ref 22–31)
CREAT SERPL-MCNC: 1.16 MG/DL — SIGNIFICANT CHANGE UP (ref 0.5–1.3)
CREAT SERPL-MCNC: 1.16 MG/DL — SIGNIFICANT CHANGE UP (ref 0.5–1.3)
EGFR: 49 ML/MIN/1.73M2 — LOW
EGFR: 49 ML/MIN/1.73M2 — LOW
GLUCOSE BLDC GLUCOMTR-MCNC: 103 MG/DL — HIGH (ref 70–99)
GLUCOSE BLDC GLUCOMTR-MCNC: 103 MG/DL — HIGH (ref 70–99)
GLUCOSE BLDC GLUCOMTR-MCNC: 206 MG/DL — HIGH (ref 70–99)
GLUCOSE BLDC GLUCOMTR-MCNC: 206 MG/DL — HIGH (ref 70–99)
GLUCOSE BLDC GLUCOMTR-MCNC: 84 MG/DL — SIGNIFICANT CHANGE UP (ref 70–99)
GLUCOSE BLDC GLUCOMTR-MCNC: 84 MG/DL — SIGNIFICANT CHANGE UP (ref 70–99)
GLUCOSE BLDC GLUCOMTR-MCNC: 89 MG/DL — SIGNIFICANT CHANGE UP (ref 70–99)
GLUCOSE BLDC GLUCOMTR-MCNC: 89 MG/DL — SIGNIFICANT CHANGE UP (ref 70–99)
GLUCOSE SERPL-MCNC: 81 MG/DL — SIGNIFICANT CHANGE UP (ref 70–99)
GLUCOSE SERPL-MCNC: 81 MG/DL — SIGNIFICANT CHANGE UP (ref 70–99)
HCT VFR BLD CALC: 34.7 % — SIGNIFICANT CHANGE UP (ref 34.5–45)
HCT VFR BLD CALC: 34.7 % — SIGNIFICANT CHANGE UP (ref 34.5–45)
HGB BLD-MCNC: 10.7 G/DL — LOW (ref 11.5–15.5)
HGB BLD-MCNC: 10.7 G/DL — LOW (ref 11.5–15.5)
MAGNESIUM SERPL-MCNC: 1.8 MG/DL — SIGNIFICANT CHANGE UP (ref 1.6–2.6)
MAGNESIUM SERPL-MCNC: 1.8 MG/DL — SIGNIFICANT CHANGE UP (ref 1.6–2.6)
MCHC RBC-ENTMCNC: 29.2 PG — SIGNIFICANT CHANGE UP (ref 27–34)
MCHC RBC-ENTMCNC: 29.2 PG — SIGNIFICANT CHANGE UP (ref 27–34)
MCHC RBC-ENTMCNC: 30.8 GM/DL — LOW (ref 32–36)
MCHC RBC-ENTMCNC: 30.8 GM/DL — LOW (ref 32–36)
MCV RBC AUTO: 94.6 FL — SIGNIFICANT CHANGE UP (ref 80–100)
MCV RBC AUTO: 94.6 FL — SIGNIFICANT CHANGE UP (ref 80–100)
NRBC # BLD: 0 /100 WBCS — SIGNIFICANT CHANGE UP (ref 0–0)
NRBC # BLD: 0 /100 WBCS — SIGNIFICANT CHANGE UP (ref 0–0)
PHOSPHATE SERPL-MCNC: 3.6 MG/DL — SIGNIFICANT CHANGE UP (ref 2.5–4.5)
PHOSPHATE SERPL-MCNC: 3.6 MG/DL — SIGNIFICANT CHANGE UP (ref 2.5–4.5)
PLATELET # BLD AUTO: 164 K/UL — SIGNIFICANT CHANGE UP (ref 150–400)
PLATELET # BLD AUTO: 164 K/UL — SIGNIFICANT CHANGE UP (ref 150–400)
POTASSIUM SERPL-MCNC: 4.1 MMOL/L — SIGNIFICANT CHANGE UP (ref 3.5–5.3)
POTASSIUM SERPL-MCNC: 4.1 MMOL/L — SIGNIFICANT CHANGE UP (ref 3.5–5.3)
POTASSIUM SERPL-SCNC: 4.1 MMOL/L — SIGNIFICANT CHANGE UP (ref 3.5–5.3)
POTASSIUM SERPL-SCNC: 4.1 MMOL/L — SIGNIFICANT CHANGE UP (ref 3.5–5.3)
RBC # BLD: 3.67 M/UL — LOW (ref 3.8–5.2)
RBC # BLD: 3.67 M/UL — LOW (ref 3.8–5.2)
RBC # FLD: 14.8 % — HIGH (ref 10.3–14.5)
RBC # FLD: 14.8 % — HIGH (ref 10.3–14.5)
SODIUM SERPL-SCNC: 135 MMOL/L — SIGNIFICANT CHANGE UP (ref 135–145)
SODIUM SERPL-SCNC: 135 MMOL/L — SIGNIFICANT CHANGE UP (ref 135–145)
WBC # BLD: 6 K/UL — SIGNIFICANT CHANGE UP (ref 3.8–10.5)
WBC # BLD: 6 K/UL — SIGNIFICANT CHANGE UP (ref 3.8–10.5)
WBC # FLD AUTO: 6 K/UL — SIGNIFICANT CHANGE UP (ref 3.8–10.5)
WBC # FLD AUTO: 6 K/UL — SIGNIFICANT CHANGE UP (ref 3.8–10.5)

## 2024-01-02 RX ORDER — PIOGLITAZONE HYDROCHLORIDE 15 MG/1
1 TABLET ORAL
Qty: 30 | Refills: 0
Start: 2024-01-02 | End: 2024-01-31

## 2024-01-02 RX ORDER — LOSARTAN POTASSIUM 100 MG/1
0 TABLET, FILM COATED ORAL
Qty: 0 | Refills: 0 | DISCHARGE
Start: 2024-01-02

## 2024-01-02 RX ORDER — METOPROLOL TARTRATE 50 MG
1 TABLET ORAL
Qty: 0 | Refills: 0 | DISCHARGE

## 2024-01-02 RX ADMIN — INSULIN GLARGINE 22 UNIT(S): 100 INJECTION, SOLUTION SUBCUTANEOUS at 22:26

## 2024-01-02 RX ADMIN — PREGABALIN 1000 MICROGRAM(S): 225 CAPSULE ORAL at 12:57

## 2024-01-02 RX ADMIN — Medication 4: at 13:40

## 2024-01-02 RX ADMIN — Medication 1 MILLIGRAM(S): at 12:57

## 2024-01-02 RX ADMIN — HEPARIN SODIUM 5000 UNIT(S): 5000 INJECTION INTRAVENOUS; SUBCUTANEOUS at 05:41

## 2024-01-02 RX ADMIN — AMLODIPINE BESYLATE 10 MILLIGRAM(S): 2.5 TABLET ORAL at 05:40

## 2024-01-02 RX ADMIN — Medication 1000 MILLIGRAM(S): at 05:41

## 2024-01-02 RX ADMIN — Medication 5 MILLIGRAM(S): at 05:40

## 2024-01-02 RX ADMIN — Medication 200 MILLIGRAM(S): at 18:34

## 2024-01-02 RX ADMIN — HEPARIN SODIUM 5000 UNIT(S): 5000 INJECTION INTRAVENOUS; SUBCUTANEOUS at 13:41

## 2024-01-02 RX ADMIN — Medication 10 UNIT(S): at 13:40

## 2024-01-02 RX ADMIN — ATORVASTATIN CALCIUM 40 MILLIGRAM(S): 80 TABLET, FILM COATED ORAL at 22:42

## 2024-01-02 RX ADMIN — HEPARIN SODIUM 5000 UNIT(S): 5000 INJECTION INTRAVENOUS; SUBCUTANEOUS at 22:41

## 2024-01-02 RX ADMIN — Medication 200 MILLIGRAM(S): at 05:40

## 2024-01-02 NOTE — PROGRESS NOTE ADULT - PROBLEM SELECTOR PLAN 11
CTH shows chronic R occipital lobe, chronic L internal capsule, L thalamus, L cerebellar infarcts  No focal deficits  - risk benefit discussion- c/w aspirin 81mg vs hx of falls

## 2024-01-02 NOTE — PROGRESS NOTE ADULT - PROBLEM SELECTOR PROBLEM 1
Insulin dependent type 2 diabetes mellitus
Acute UTI
Insulin dependent type 2 diabetes mellitus
Insulin dependent type 2 diabetes mellitus
Acute UTI

## 2024-01-02 NOTE — DISCHARGE NOTE NURSING/CASE MANAGEMENT/SOCIAL WORK - NSDCPEFALRISK_GEN_ALL_CORE
For information on Fall & Injury Prevention, visit: [email or web address] OR  [email or web address] OR  https://www.cdc.gov/steadi/patient.html For information on Fall & Injury Prevention, visit: [email or web address] OR  [email or web address] OR  https://www.cdc.gov/steadi/patient.html

## 2024-01-02 NOTE — PROGRESS NOTE ADULT - PROBLEM SELECTOR PROBLEM 7
Elevated alkaline phosphatase level
Acute UTI
Acute UTI
Breath sounds clear and equal bilaterally.
Acute UTI
Acute UTI
Elevated alkaline phosphatase level
Elevated alkaline phosphatase level
Acute UTI
Elevated alkaline phosphatase level

## 2024-01-02 NOTE — PROGRESS NOTE ADULT - PROBLEM SELECTOR PROBLEM 10
Stroke
Stroke
Iron deficiency anemia
Stroke
Iron deficiency anemia
Iron deficiency anemia
Stroke
Iron deficiency anemia
Stroke

## 2024-01-02 NOTE — CHART NOTE - NSCHARTNOTEFT_GEN_A_CORE
When medically ready for discharge, please list appointment with Dr. Sedrick Delaney at the [facility] [place] on 1/9/24 at 11:30 AM for her next scheduled treatment. Please ensure that discharge appointment and information is provided in paperwork prior to discharge. When medically ready for discharge, please list appointment with Dr. Sedrick Delaney at the [facility] [place] on 1/9/24 at 11:30 AM for her next scheduled treatment. Please ensure that discharge appointment and information is provided in paperwork prior to discharge.

## 2024-01-02 NOTE — PROGRESS NOTE ADULT - PROBLEM SELECTOR PLAN 4
CHIEF COMPLAINT:   Chief Complaint   Patient presents with   • Gyn Exam     Patient is here for her yearly exam. Last pap 10/24/2018: ASC-US! with HRHPV-        HPI: Patient is here for routine GYN examination.  She has no GYN or breast complaints other than she gets chronic irritation of the skin above the clitoral area which is relief with Monistat.  Her son was diagnosed with a rare genetic neurologic disorder affecting his lower extremities which was a spontaneous mutation for him and not inherited from either of the parents.  He has been through various splints and therapy since he was 1 years old and they are currently trying some other methods with him.  Her daughter is doing well and patient is a .  Gets breast tenderness the week prior to her menses which goes away worse on the left than the right and I recommended to try decrease caffeine.    ROS  General/Constitutional Denies.    Urology Denies.    Integumentary Denies.    Women Only Denies.    Genitourinary Denies.    Skin Denies.    Gastrointestinal Denies.    Endocrine Denies.    Psychiatric Denies.    Health Education Admits.     Please review HPI for any of my pertinent findings.     OB/GYN HISTORY  OB History    Para Term  AB Living   2 2 0 0 0 2   SAB TAB Ectopic Molar Multiple Live Births   0 0 0 0 0 2       Menstrual History  Patient's last menstrual period was 2020.  Menstrual Tracking  Period Cycle (Days): 28  Period Duration (Days): 4  Period Pattern: Regular  Menstrual Flow: Heavy  Menstrual Control: Tampon  Dysmenorrhea: (!) Mild  Dysmenorrhea Symptoms: Cramping     HISTORY  Past Medical History:   Diagnosis Date   • Gallstone    • Kidney stones    • Smoker    • Thyroid nodule       Past Surgical History:   Procedure Laterality Date   • Cholecystectomy  2018   • Dexa bone density axial skeleton       Current Outpatient Medications   Medication Sig Dispense Refill   • Chantix Starting Month Rick 
0.5 MG X 11 & 1 MG X 42 tablet TK UTD     • levonorgestrel (ALEE) 13.5 MG intrauterine device 13.5 mg by Intrauterine route.       No current facility-administered medications for this visit.      ALLERGIES:  No Known Allergies  Family History   Problem Relation Age of Onset   • Cancer, Colon Maternal Grandmother    • Patient is unaware of any medical problems Mother    • Patient is unaware of any medical problems Father    • Aneurysm Maternal Grandfather    • Aneurysm Paternal Grandfather      Social History     Tobacco Use   • Smoking status: Current Every Day Smoker     Packs/day: 0.00   • Smokeless tobacco: Never Used   Substance Use Topics   • Alcohol use: Yes   • Drug use: Never      Histories were reviewed and updated during today's visit.    PHYSICAL EXAM  Visit Vitals  /70   Pulse (!) 101   Temp 98.5 °F (36.9 °C) (Temporal)   Ht 5' 2.4\" (1.585 m)   Wt 78.2 kg (172 lb 6.4 oz)   LMP 07/27/2020   SpO2 99%   BMI 31.13 kg/m²     Vital signs were reviewed during today's visit.   GENERAL APEARANCE:   normal appearing female with normal affect and in no distress  BREASTS:   No masses, non-tender, no nipple discharge or lymphadenopathy. No skin changes.  Mild fibrocystic changes bilaterally  Axilla : No masses or adenopathy  ABDOMEN: Soft, non-tender, non-distended.  No hepatosplenomegaly.  Normal bowel sounds.  No umbilical or inguinal hernias.  SKIN:  Warm and dry to touch.  No lesions or rashes noted.    PSYCHIATRIC/NEUROLOGIC:  Appropriate mood and affect, normal recall, alert and oriented x 3  EXTREMITIES:  Warm and well perfused.  No edema noted.    :  VULVA: Inspection of her external genitalia reveals normal mons pubis, labia minora and labia majora.  Normal appearing     Clitoris, urethral meatus and Laverne's glands. No lesions.  URETHRA : Normal   BLADDER:  No evidence of urethral or bladder tenderness.    VAGINA:  Normal, no lesions or masses  CERVIX:  Normal in appearance with no lesions.  There 
is no cervical motion tenderness.   UTERUS:  Uterus is mobile and non-tender,normal size.   ADNEXA : No masses or tenderness  ANUS : Normal external appearance    ASSESSMENT  Diagnoses and all orders for this visit:  Gynecologic exam normal  Screening for malignant neoplasm of cervix      PLAN: Routinely if okay    No follow-ups on file.        Moises Stubbs MD  8/20/2020  
Reports she had flu vaccine 2 weeks ago and has had cough since then. Denies fever, chills, sick contacts  Afebrile, mild leukocytosis  s/p 1L LR  - c/w PO fluids   - c/w supportive measures

## 2024-01-02 NOTE — PROGRESS NOTE ADULT - PROBLEM SELECTOR PROBLEM 6
Iron deficiency anemia
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
Iron deficiency anemia
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
Iron deficiency anemia
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

## 2024-01-02 NOTE — PROGRESS NOTE ADULT - PROBLEM SELECTOR PLAN 6
Follows with Dr Delaney  Last chemo session 11/16- has been holding chemo sessions due to hyperglycemia  MR head 10/23 showing R posterior frontal enhancing lesion with vasogenic edema which resolved on [facility] 10/23  Has port placed at [facility] 9/2023 per patient- site clean and dry reports last accessed 11/2023 for possible chemo  - f/u outpatient
Follows with Dr Delaney  Last chemo session 11/16- has been holding chemo sessions due to hyperglycemia  MR head 10/23 showing R posterior frontal enhancing lesion with vasogenic edema which resolved on [facility] 10/23  Has port placed at [facility] 9/2023 per patient- site clean and dry reports last accessed 11/2023 for possible chemo  - f/u outpatient  - ensure DM under control w endo recs

## 2024-01-02 NOTE — PROGRESS NOTE ADULT - PROBLEM SELECTOR PROBLEM 9
HTN (hypertension)
HTN (hypertension)
Fall
HTN (hypertension)
Fall
Fall
HTN (hypertension)

## 2024-01-02 NOTE — PROGRESS NOTE ADULT - PROBLEM SELECTOR PROBLEM 2
HTN (hypertension)
Fall
Fall
HTN (hypertension)
Fall
HTN (hypertension)
Fall

## 2024-01-02 NOTE — PROGRESS NOTE ADULT - TIME BILLING
Patient seen and examined;  Remains on Ertapenem   Glucose control   Continue present medication
Patient seen and examined;  Plans as outlined  Need better glucose control; prior to discharge  Discussed with patient
case complexity
Insulin adjustment, regimen for possible discharge
Patient seen and examined with resident this morning;  Endocrine follow up ; Likely needs pre meal insulin  Also Oncology follow up  Also restart Hydroxychloroquine and sulfadiazine  Physical therapy to see
Patient seen and examined;  Feels much improved;  She is ready for discharge tomorrow;  Insulin regimen as outlined

## 2024-01-02 NOTE — PROGRESS NOTE ADULT - SUBJECTIVE AND OBJECTIVE BOX
SUBJECTIVE / INTERVAL HPI: Patient was seen and examined this morning.     Overnight events:    CAPILLARY BLOOD GLUCOSE & INSULIN RECEIVED  103 mg/dL (12-31 @ 22:23)  139 mg/dL (01-01 @ 09:04)  182 mg/dL (01-01 @ 13:18)  135 mg/dL (01-01 @ 17:53)  127 mg/dL (01-01 @ 21:43)  103 mg/dL (01-02 @ 09:17)      REVIEW OF SYSTEMS  Constitutional:  Negative fever, chills or loss of appetite.  Eyes:  Negative blurry vision or double vision.  Cardiovascular:  Negative for chest pain or palpitations.  Respiratory:  Negative for cough, wheezing, or shortness of breath.    Gastrointestinal:  Negative for nausea, vomiting, diarrhea, constipation, or abdominal pain.  Genitourinary:  Negative frequency, urgency or dysuria.  Neurologic:  No headache, confusion, dizziness, lightheadedness.    PHYSICAL EXAM  Vital Signs Last 24 Hrs  T(C): 36.6 (02 Jan 2024 05:02), Max: 36.7 (01 Jan 2024 12:07)  T(F): 97.9 (02 Jan 2024 05:02), Max: 98.1 (01 Jan 2024 12:07)  HR: 69 (02 Jan 2024 05:02) (69 - 74)  BP: 143/79 (02 Jan 2024 05:02) (116/70 - 143/79)  BP(mean): --  RR: 18 (02 Jan 2024 05:02) (17 - 18)  SpO2: 98% (02 Jan 2024 05:02) (95% - 98%)    Parameters below as of 02 Jan 2024 05:02  Patient On (Oxygen Delivery Method): room air        Constitutional: Awake, alert, in no acute distress.   HEENT: Normocephalic, atraumatic, NEDRA.  Respiratory: Lungs clear to ausculation bilaterally.   Cardiovascular: regular rhythm, normal S1 and S2, no audible murmurs.   GI: soft, non-tender, non-distended, bowel sounds present.  Extremities: No lower extremity edema.  Psychiatric: AAO x 3. Normal affect/mood.     LABS  CBC - WBC/HGB/HTC/PLT: 6.00/10.7/34.7/164 (01-02-24)  BMP - Na/K/Cl/Bicarb/BUN/Cr/Gluc/AG/eGFR: 135/4.1/104/22/21/1.16/81/9/49 (01-02-24)  Ca - 8.8 (01-02-24)  Phos - 3.6 (01-02-24)  Mg - 1.8 (01-02-24)  LFT - Alb/Tprot/Tbili/Dbili/AlkPhos/ALT/AST: 2.7/--/0.2/--/92/8/17 (12-31-23)    Thyroid Stimulating Hormone, Serum: 0.473 (11-16-23)          MEDICATIONS  MEDICATIONS  (STANDING):  amLODIPine   Tablet 10 milliGRAM(s) Oral daily  atorvastatin 40 milliGRAM(s) Oral at bedtime  cyanocobalamin 1000 MICROGram(s) Oral daily  dextrose 5%. 1000 milliLiter(s) (50 mL/Hr) IV Continuous <Continuous>  dextrose 5%. 1000 milliLiter(s) (100 mL/Hr) IV Continuous <Continuous>  dextrose 5%. 1000 milliLiter(s) (100 mL/Hr) IV Continuous <Continuous>  dextrose 5%. 1000 milliLiter(s) (50 mL/Hr) IV Continuous <Continuous>  dextrose 50% Injectable 25 Gram(s) IV Push once  dextrose 50% Injectable 25 Gram(s) IV Push once  dextrose 50% Injectable 12.5 Gram(s) IV Push once  dextrose 50% Injectable 25 Gram(s) IV Push once  dextrose 50% Injectable 25 Gram(s) IV Push once  dextrose 50% Injectable 12.5 Gram(s) IV Push once  folic acid 1 milliGRAM(s) Oral daily  glucagon  Injectable 1 milliGRAM(s) IntraMuscular once  glucagon  Injectable 1 milliGRAM(s) IntraMuscular once  heparin   Injectable 5000 Unit(s) SubCutaneous every 8 hours  hydroxychloroquine 200 milliGRAM(s) Oral two times a day  influenza  Vaccine (HIGH DOSE) 0.7 milliLiter(s) IntraMuscular once  insulin glargine Injectable (LANTUS) 22 Unit(s) SubCutaneous at bedtime  insulin lispro (ADMELOG) corrective regimen sliding scale   SubCutaneous Before meals and at bedtime  insulin lispro Injectable (ADMELOG) 10 Unit(s) SubCutaneous three times a day before meals  predniSONE   Tablet 5 milliGRAM(s) Oral daily  sulfaSALAzine 1000 milliGRAM(s) Oral two times a day    MEDICATIONS  (PRN):  acetaminophen     Tablet .. 650 milliGRAM(s) Oral every 6 hours PRN Temp greater or equal to 38C (100.4F), Mild Pain (1 - 3)  dextrose Oral Gel 15 Gram(s) Oral once PRN Blood Glucose LESS THAN 70 milliGRAM(s)/deciliter  dextrose Oral Gel 15 Gram(s) Oral once PRN Blood Glucose LESS THAN 70 milliGRAM(s)/deciliter    ASSESSMENT / RECOMMENDATIONS    A1C: 11.5 %  BUN: 21  Creatinine: 1.16  GFR: 49  Weight: 62.1  BMI: 24.3    # Type 2 diabetes mellitus with hyperglycemia  - Please keep lantus  units at bedtime.   - keep lispro   units before each meal.  - Continue lispro moderate dose sliding scale before meals and at bedtime.  - Patient's fingerstick glucose goal is 100-180 mg/dL.    - Discharge recommendations to be discussed.   - Patient can follow up at discharge with [facility] by calling [phone] to make an appointment.      Case discussed with Dr. Blackwood. Primary team updated.       Alix Harmon  Endocrinology Fellow    Service Pager: [phone]    SUBJECTIVE / INTERVAL HPI: Patient was seen and examined this morning.     Overnight events:    CAPILLARY BLOOD GLUCOSE & INSULIN RECEIVED  103 mg/dL (12-31 @ 22:23)  139 mg/dL (01-01 @ 09:04)  182 mg/dL (01-01 @ 13:18)  135 mg/dL (01-01 @ 17:53)  127 mg/dL (01-01 @ 21:43)  103 mg/dL (01-02 @ 09:17)      REVIEW OF SYSTEMS  Constitutional:  Negative fever, chills or loss of appetite.  Eyes:  Negative blurry vision or double vision.  Cardiovascular:  Negative for chest pain or palpitations.  Respiratory:  Negative for cough, wheezing, or shortness of breath.    Gastrointestinal:  Negative for nausea, vomiting, diarrhea, constipation, or abdominal pain.  Genitourinary:  Negative frequency, urgency or dysuria.  Neurologic:  No headache, confusion, dizziness, lightheadedness.    PHYSICAL EXAM  Vital Signs Last 24 Hrs  T(C): 36.6 (02 Jan 2024 05:02), Max: 36.7 (01 Jan 2024 12:07)  T(F): 97.9 (02 Jan 2024 05:02), Max: 98.1 (01 Jan 2024 12:07)  HR: 69 (02 Jan 2024 05:02) (69 - 74)  BP: 143/79 (02 Jan 2024 05:02) (116/70 - 143/79)  BP(mean): --  RR: 18 (02 Jan 2024 05:02) (17 - 18)  SpO2: 98% (02 Jan 2024 05:02) (95% - 98%)    Parameters below as of 02 Jan 2024 05:02  Patient On (Oxygen Delivery Method): room air        Constitutional: Awake, alert, in no acute distress.   HEENT: Normocephalic, atraumatic, NEDRA.  Respiratory: Lungs clear to ausculation bilaterally.   Cardiovascular: regular rhythm, normal S1 and S2, no audible murmurs.   GI: soft, non-tender, non-distended, bowel sounds present.  Extremities: No lower extremity edema.  Psychiatric: AAO x 3. Normal affect/mood.     LABS  CBC - WBC/HGB/HTC/PLT: 6.00/10.7/34.7/164 (01-02-24)  BMP - Na/K/Cl/Bicarb/BUN/Cr/Gluc/AG/eGFR: 135/4.1/104/22/21/1.16/81/9/49 (01-02-24)  Ca - 8.8 (01-02-24)  Phos - 3.6 (01-02-24)  Mg - 1.8 (01-02-24)  LFT - Alb/Tprot/Tbili/Dbili/AlkPhos/ALT/AST: 2.7/--/0.2/--/92/8/17 (12-31-23)    Thyroid Stimulating Hormone, Serum: 0.473 (11-16-23)          MEDICATIONS  MEDICATIONS  (STANDING):  amLODIPine   Tablet 10 milliGRAM(s) Oral daily  atorvastatin 40 milliGRAM(s) Oral at bedtime  cyanocobalamin 1000 MICROGram(s) Oral daily  dextrose 5%. 1000 milliLiter(s) (50 mL/Hr) IV Continuous <Continuous>  dextrose 5%. 1000 milliLiter(s) (100 mL/Hr) IV Continuous <Continuous>  dextrose 5%. 1000 milliLiter(s) (100 mL/Hr) IV Continuous <Continuous>  dextrose 5%. 1000 milliLiter(s) (50 mL/Hr) IV Continuous <Continuous>  dextrose 50% Injectable 25 Gram(s) IV Push once  dextrose 50% Injectable 25 Gram(s) IV Push once  dextrose 50% Injectable 12.5 Gram(s) IV Push once  dextrose 50% Injectable 25 Gram(s) IV Push once  dextrose 50% Injectable 25 Gram(s) IV Push once  dextrose 50% Injectable 12.5 Gram(s) IV Push once  folic acid 1 milliGRAM(s) Oral daily  glucagon  Injectable 1 milliGRAM(s) IntraMuscular once  glucagon  Injectable 1 milliGRAM(s) IntraMuscular once  heparin   Injectable 5000 Unit(s) SubCutaneous every 8 hours  hydroxychloroquine 200 milliGRAM(s) Oral two times a day  influenza  Vaccine (HIGH DOSE) 0.7 milliLiter(s) IntraMuscular once  insulin glargine Injectable (LANTUS) 22 Unit(s) SubCutaneous at bedtime  insulin lispro (ADMELOG) corrective regimen sliding scale   SubCutaneous Before meals and at bedtime  insulin lispro Injectable (ADMELOG) 10 Unit(s) SubCutaneous three times a day before meals  predniSONE   Tablet 5 milliGRAM(s) Oral daily  sulfaSALAzine 1000 milliGRAM(s) Oral two times a day    MEDICATIONS  (PRN):  acetaminophen     Tablet .. 650 milliGRAM(s) Oral every 6 hours PRN Temp greater or equal to 38C (100.4F), Mild Pain (1 - 3)  dextrose Oral Gel 15 Gram(s) Oral once PRN Blood Glucose LESS THAN 70 milliGRAM(s)/deciliter  dextrose Oral Gel 15 Gram(s) Oral once PRN Blood Glucose LESS THAN 70 milliGRAM(s)/deciliter    ASSESSMENT / RECOMMENDATIONS    A1C: 11.5 %  BUN: 21  Creatinine: 1.16  GFR: 49  Weight: 62.1  BMI: 24.3    # Type 2 diabetes mellitus with hyperglycemia  - Please keep lantus  units at bedtime.   - keep lispro   units before each meal.  - Continue lispro moderate dose sliding scale before meals and at bedtime.  - Patient's fingerstick glucose goal is 100-180 mg/dL.    - Discharge recommendations to be discussed.   - Patient can follow up at discharge with [facility] by calling [phone] to make an appointment.      Case discussed with Dr. Blackwood. Primary team updated.       Alix Harmon  Endocrinology Fellow    Service Pager: [phone]    SUBJECTIVE / INTERVAL HPI: Patient was seen and examined this morning.     Overnight events:    12/28: lantus 15 + lispro 6  12/29: lanrus 20 + lispro 6  12/31: lantus 22 + lispro 10     CAPILLARY BLOOD GLUCOSE & INSULIN RECEIVED  103 mg/dL (12-31 @ 22:23)  139 mg/dL (01-01 @ 09:04)  182 mg/dL (01-01 @ 13:18)  135 mg/dL (01-01 @ 17:53) 10   127 mg/dL (01-01 @ 21:43) 22  103 mg/dL (01-02 @ 09:17) 10      REVIEW OF SYSTEMS  Constitutional:  Negative fever, chills or loss of appetite.  Eyes:  Negative blurry vision or double vision.  Cardiovascular:  Negative for chest pain or palpitations.  Respiratory:  Negative for cough, wheezing, or shortness of breath.    Gastrointestinal:  Negative for nausea, vomiting, diarrhea, constipation, or abdominal pain.  Genitourinary:  Negative frequency, urgency or dysuria.  Neurologic:  No headache, confusion, dizziness, lightheadedness.    PHYSICAL EXAM  Vital Signs Last 24 Hrs  T(C): 36.6 (02 Jan 2024 05:02), Max: 36.7 (01 Jan 2024 12:07)  T(F): 97.9 (02 Jan 2024 05:02), Max: 98.1 (01 Jan 2024 12:07)  HR: 69 (02 Jan 2024 05:02) (69 - 74)  BP: 143/79 (02 Jan 2024 05:02) (116/70 - 143/79)  BP(mean): --  RR: 18 (02 Jan 2024 05:02) (17 - 18)  SpO2: 98% (02 Jan 2024 05:02) (95% - 98%)    Parameters below as of 02 Jan 2024 05:02  Patient On (Oxygen Delivery Method): room air        Constitutional: Awake, alert, in no acute distress.   HEENT: Normocephalic, atraumatic, NEDRA.  Respiratory: Lungs clear to ausculation bilaterally.   Cardiovascular: regular rhythm, normal S1 and S2, no audible murmurs.   GI: soft, non-tender, non-distended, bowel sounds present.  Extremities: No lower extremity edema.  Psychiatric: AAO x 3. Normal affect/mood.     LABS  CBC - WBC/HGB/HTC/PLT: 6.00/10.7/34.7/164 (01-02-24)  BMP - Na/K/Cl/Bicarb/BUN/Cr/Gluc/AG/eGFR: 135/4.1/104/22/21/1.16/81/9/49 (01-02-24)  Ca - 8.8 (01-02-24)  Phos - 3.6 (01-02-24)  Mg - 1.8 (01-02-24)  LFT - Alb/Tprot/Tbili/Dbili/AlkPhos/ALT/AST: 2.7/--/0.2/--/92/8/17 (12-31-23)    Thyroid Stimulating Hormone, Serum: 0.473 (11-16-23)          MEDICATIONS  MEDICATIONS  (STANDING):  amLODIPine   Tablet 10 milliGRAM(s) Oral daily  atorvastatin 40 milliGRAM(s) Oral at bedtime  cyanocobalamin 1000 MICROGram(s) Oral daily  dextrose 5%. 1000 milliLiter(s) (50 mL/Hr) IV Continuous <Continuous>  dextrose 5%. 1000 milliLiter(s) (100 mL/Hr) IV Continuous <Continuous>  dextrose 5%. 1000 milliLiter(s) (100 mL/Hr) IV Continuous <Continuous>  dextrose 5%. 1000 milliLiter(s) (50 mL/Hr) IV Continuous <Continuous>  dextrose 50% Injectable 25 Gram(s) IV Push once  dextrose 50% Injectable 25 Gram(s) IV Push once  dextrose 50% Injectable 12.5 Gram(s) IV Push once  dextrose 50% Injectable 25 Gram(s) IV Push once  dextrose 50% Injectable 25 Gram(s) IV Push once  dextrose 50% Injectable 12.5 Gram(s) IV Push once  folic acid 1 milliGRAM(s) Oral daily  glucagon  Injectable 1 milliGRAM(s) IntraMuscular once  glucagon  Injectable 1 milliGRAM(s) IntraMuscular once  heparin   Injectable 5000 Unit(s) SubCutaneous every 8 hours  hydroxychloroquine 200 milliGRAM(s) Oral two times a day  influenza  Vaccine (HIGH DOSE) 0.7 milliLiter(s) IntraMuscular once  insulin glargine Injectable (LANTUS) 22 Unit(s) SubCutaneous at bedtime  insulin lispro (ADMELOG) corrective regimen sliding scale   SubCutaneous Before meals and at bedtime  insulin lispro Injectable (ADMELOG) 10 Unit(s) SubCutaneous three times a day before meals  predniSONE   Tablet 5 milliGRAM(s) Oral daily  sulfaSALAzine 1000 milliGRAM(s) Oral two times a day    MEDICATIONS  (PRN):  acetaminophen     Tablet .. 650 milliGRAM(s) Oral every 6 hours PRN Temp greater or equal to 38C (100.4F), Mild Pain (1 - 3)  dextrose Oral Gel 15 Gram(s) Oral once PRN Blood Glucose LESS THAN 70 milliGRAM(s)/deciliter  dextrose Oral Gel 15 Gram(s) Oral once PRN Blood Glucose LESS THAN 70 milliGRAM(s)/deciliter    ASSESSMENT / RECOMMENDATIONS    Ms. Minor is a 73-year-old female with a past medical history of type 2 diabetes mellitus, hypertension, hyperlipidemia, chronic kidney disease 3a, NSCLC adenocarcinoma with mets to brain (s/p RT x1 07/2023, s/p chemo x2 (last tx 11/16/23), rheumatoid arthritis and right hip replacement who presented to [facility] on 12/26/23 with persist dysuria and polyuria. Labs were also significant for (+) influenza. She was for further management of urinary tract infection and influenza. Endocrinology was consulted for recommendations regarding management of diabetes.     A1C: 11.5 %  BUN: 21  Creatinine: 1.16  GFR: 49  Weight: 62.1  BMI: 24.3    # Type 2 diabetes mellitus with hyperglycemia  - pt will remain on prednisone 5  - Please keep lantus 22  units at bedtime.   - Keep lispro  10  units before each meal.  - Continue lispro moderate dose sliding scale before meals and at bedtime.  - Patient's fingerstick glucose goal is 100-180 mg/dL.    - Discharge recommendations: resume home meds Lantus 17 units at bedtime + januvia 100mg daily, start Pioglitazone 15 daily, discontinue Farxiga in setting of recurrent UTI  - Patient can follow up at discharge with [facility] by calling [phone] to make an appointment.       Case discussed with Dr. Blackwood. Primary team updated.       Alix Harmon  Endocrinology Fellow    Service Pager: [phone]    SUBJECTIVE / INTERVAL HPI: Patient was seen and examined this morning.     Overnight events:    12/28: lantus 15 + lispro 6  12/29: lanrus 20 + lispro 6  12/31: lantus 22 + lispro 10     CAPILLARY BLOOD GLUCOSE & INSULIN RECEIVED  103 mg/dL (12-31 @ 22:23)  139 mg/dL (01-01 @ 09:04)  182 mg/dL (01-01 @ 13:18)  135 mg/dL (01-01 @ 17:53) 10   127 mg/dL (01-01 @ 21:43) 22  103 mg/dL (01-02 @ 09:17) 10      REVIEW OF SYSTEMS  Constitutional:  Negative fever, chills or loss of appetite.  Eyes:  Negative blurry vision or double vision.  Cardiovascular:  Negative for chest pain or palpitations.  Respiratory:  Negative for cough, wheezing, or shortness of breath.    Gastrointestinal:  Negative for nausea, vomiting, diarrhea, constipation, or abdominal pain.  Genitourinary:  Negative frequency, urgency or dysuria.  Neurologic:  No headache, confusion, dizziness, lightheadedness.    PHYSICAL EXAM  Vital Signs Last 24 Hrs  T(C): 36.6 (02 Jan 2024 05:02), Max: 36.7 (01 Jan 2024 12:07)  T(F): 97.9 (02 Jan 2024 05:02), Max: 98.1 (01 Jan 2024 12:07)  HR: 69 (02 Jan 2024 05:02) (69 - 74)  BP: 143/79 (02 Jan 2024 05:02) (116/70 - 143/79)  BP(mean): --  RR: 18 (02 Jan 2024 05:02) (17 - 18)  SpO2: 98% (02 Jan 2024 05:02) (95% - 98%)    Parameters below as of 02 Jan 2024 05:02  Patient On (Oxygen Delivery Method): room air        Constitutional: Awake, alert, in no acute distress.   HEENT: Normocephalic, atraumatic, NEDRA.  Respiratory: Lungs clear to ausculation bilaterally.   Cardiovascular: regular rhythm, normal S1 and S2, no audible murmurs.   GI: soft, non-tender, non-distended, bowel sounds present.  Extremities: No lower extremity edema.  Psychiatric: AAO x 3. Normal affect/mood.     LABS  CBC - WBC/HGB/HTC/PLT: 6.00/10.7/34.7/164 (01-02-24)  BMP - Na/K/Cl/Bicarb/BUN/Cr/Gluc/AG/eGFR: 135/4.1/104/22/21/1.16/81/9/49 (01-02-24)  Ca - 8.8 (01-02-24)  Phos - 3.6 (01-02-24)  Mg - 1.8 (01-02-24)  LFT - Alb/Tprot/Tbili/Dbili/AlkPhos/ALT/AST: 2.7/--/0.2/--/92/8/17 (12-31-23)    Thyroid Stimulating Hormone, Serum: 0.473 (11-16-23)          MEDICATIONS  MEDICATIONS  (STANDING):  amLODIPine   Tablet 10 milliGRAM(s) Oral daily  atorvastatin 40 milliGRAM(s) Oral at bedtime  cyanocobalamin 1000 MICROGram(s) Oral daily  dextrose 5%. 1000 milliLiter(s) (50 mL/Hr) IV Continuous <Continuous>  dextrose 5%. 1000 milliLiter(s) (100 mL/Hr) IV Continuous <Continuous>  dextrose 5%. 1000 milliLiter(s) (100 mL/Hr) IV Continuous <Continuous>  dextrose 5%. 1000 milliLiter(s) (50 mL/Hr) IV Continuous <Continuous>  dextrose 50% Injectable 25 Gram(s) IV Push once  dextrose 50% Injectable 25 Gram(s) IV Push once  dextrose 50% Injectable 12.5 Gram(s) IV Push once  dextrose 50% Injectable 25 Gram(s) IV Push once  dextrose 50% Injectable 25 Gram(s) IV Push once  dextrose 50% Injectable 12.5 Gram(s) IV Push once  folic acid 1 milliGRAM(s) Oral daily  glucagon  Injectable 1 milliGRAM(s) IntraMuscular once  glucagon  Injectable 1 milliGRAM(s) IntraMuscular once  heparin   Injectable 5000 Unit(s) SubCutaneous every 8 hours  hydroxychloroquine 200 milliGRAM(s) Oral two times a day  influenza  Vaccine (HIGH DOSE) 0.7 milliLiter(s) IntraMuscular once  insulin glargine Injectable (LANTUS) 22 Unit(s) SubCutaneous at bedtime  insulin lispro (ADMELOG) corrective regimen sliding scale   SubCutaneous Before meals and at bedtime  insulin lispro Injectable (ADMELOG) 10 Unit(s) SubCutaneous three times a day before meals  predniSONE   Tablet 5 milliGRAM(s) Oral daily  sulfaSALAzine 1000 milliGRAM(s) Oral two times a day    MEDICATIONS  (PRN):  acetaminophen     Tablet .. 650 milliGRAM(s) Oral every 6 hours PRN Temp greater or equal to 38C (100.4F), Mild Pain (1 - 3)  dextrose Oral Gel 15 Gram(s) Oral once PRN Blood Glucose LESS THAN 70 milliGRAM(s)/deciliter  dextrose Oral Gel 15 Gram(s) Oral once PRN Blood Glucose LESS THAN 70 milliGRAM(s)/deciliter    ASSESSMENT / RECOMMENDATIONS    Ms. Minor is a 73-year-old female with a past medical history of type 2 diabetes mellitus, hypertension, hyperlipidemia, chronic kidney disease 3a, NSCLC adenocarcinoma with mets to brain (s/p RT x1 07/2023, s/p chemo x2 (last tx 11/16/23), rheumatoid arthritis and right hip replacement who presented to [facility] on 12/26/23 with persist dysuria and polyuria. Labs were also significant for (+) influenza. She was for further management of urinary tract infection and influenza. Endocrinology was consulted for recommendations regarding management of diabetes.     A1C: 11.5 %  BUN: 21  Creatinine: 1.16  GFR: 49  Weight: 62.1  BMI: 24.3    # Type 2 diabetes mellitus with hyperglycemia  - pt will remain on prednisone 5  - Please keep lantus 22  units at bedtime.   - Keep lispro  10  units before each meal.  - Continue lispro moderate dose sliding scale before meals and at bedtime.  - Patient's fingerstick glucose goal is 100-180 mg/dL.    - Discharge recommendations: resume home meds Lantus 17 units at bedtime + januvia 100mg daily, start Pioglitazone 15 daily, discontinue Farxiga in setting of recurrent UTI  - Patient can follow up at discharge with [facility] by calling [phone] to make an appointment.       Case discussed with Dr. Blackwood. Primary team updated.       Alix Harmon  Endocrinology Fellow    Service Pager: [phone]    SUBJECTIVE / INTERVAL HPI: Patient was seen and examined this morning.     Overnight events:  pt reports feeling well  she denies fevers, chills, nausea, abdominal pain    12/28: lantus 15 + lispro 6  12/29: lanrus 20 + lispro 6  12/31: lantus 22 + lispro 10     CAPILLARY BLOOD GLUCOSE & INSULIN RECEIVED  103 mg/dL (12-31 @ 22:23)  139 mg/dL (01-01 @ 09:04)  182 mg/dL (01-01 @ 13:18)  135 mg/dL (01-01 @ 17:53) 10   127 mg/dL (01-01 @ 21:43) 22  103 mg/dL (01-02 @ 09:17) 10      REVIEW OF SYSTEMS  Constitutional:  Negative fever, chills or loss of appetite.  Eyes:  Negative blurry vision or double vision.  Cardiovascular:  Negative for chest pain or palpitations.  Respiratory:  Negative for cough, wheezing, or shortness of breath.    Gastrointestinal:  Negative for nausea, vomiting, diarrhea, constipation, or abdominal pain.  Genitourinary:  Negative frequency, urgency or dysuria.  Neurologic:  No headache, confusion, dizziness, lightheadedness.    PHYSICAL EXAM  Vital Signs Last 24 Hrs  T(C): 36.6 (02 Jan 2024 05:02), Max: 36.7 (01 Jan 2024 12:07)  T(F): 97.9 (02 Jan 2024 05:02), Max: 98.1 (01 Jan 2024 12:07)  HR: 69 (02 Jan 2024 05:02) (69 - 74)  BP: 143/79 (02 Jan 2024 05:02) (116/70 - 143/79)  BP(mean): --  RR: 18 (02 Jan 2024 05:02) (17 - 18)  SpO2: 98% (02 Jan 2024 05:02) (95% - 98%)    Parameters below as of 02 Jan 2024 05:02  Patient On (Oxygen Delivery Method): room air        Constitutional: Awake, alert, in no acute distress.   HEENT: Normocephalic, atraumatic, NEDRA.  Respiratory: Lungs clear to ausculation bilaterally.   Cardiovascular: regular rhythm, normal S1 and S2, no audible murmurs.   GI: soft, non-tender, non-distended, bowel sounds present.  Extremities: No lower extremity edema.  Psychiatric: AAO x 3. Normal affect/mood.     LABS  CBC - WBC/HGB/HTC/PLT: 6.00/10.7/34.7/164 (01-02-24)  BMP - Na/K/Cl/Bicarb/BUN/Cr/Gluc/AG/eGFR: 135/4.1/104/22/21/1.16/81/9/49 (01-02-24)  Ca - 8.8 (01-02-24)  Phos - 3.6 (01-02-24)  Mg - 1.8 (01-02-24)  LFT - Alb/Tprot/Tbili/Dbili/AlkPhos/ALT/AST: 2.7/--/0.2/--/92/8/17 (12-31-23)    Thyroid Stimulating Hormone, Serum: 0.473 (11-16-23)          MEDICATIONS  MEDICATIONS  (STANDING):  amLODIPine   Tablet 10 milliGRAM(s) Oral daily  atorvastatin 40 milliGRAM(s) Oral at bedtime  cyanocobalamin 1000 MICROGram(s) Oral daily  dextrose 5%. 1000 milliLiter(s) (50 mL/Hr) IV Continuous <Continuous>  dextrose 5%. 1000 milliLiter(s) (100 mL/Hr) IV Continuous <Continuous>  dextrose 5%. 1000 milliLiter(s) (100 mL/Hr) IV Continuous <Continuous>  dextrose 5%. 1000 milliLiter(s) (50 mL/Hr) IV Continuous <Continuous>  dextrose 50% Injectable 25 Gram(s) IV Push once  dextrose 50% Injectable 25 Gram(s) IV Push once  dextrose 50% Injectable 12.5 Gram(s) IV Push once  dextrose 50% Injectable 25 Gram(s) IV Push once  dextrose 50% Injectable 25 Gram(s) IV Push once  dextrose 50% Injectable 12.5 Gram(s) IV Push once  folic acid 1 milliGRAM(s) Oral daily  glucagon  Injectable 1 milliGRAM(s) IntraMuscular once  glucagon  Injectable 1 milliGRAM(s) IntraMuscular once  heparin   Injectable 5000 Unit(s) SubCutaneous every 8 hours  hydroxychloroquine 200 milliGRAM(s) Oral two times a day  influenza  Vaccine (HIGH DOSE) 0.7 milliLiter(s) IntraMuscular once  insulin glargine Injectable (LANTUS) 22 Unit(s) SubCutaneous at bedtime  insulin lispro (ADMELOG) corrective regimen sliding scale   SubCutaneous Before meals and at bedtime  insulin lispro Injectable (ADMELOG) 10 Unit(s) SubCutaneous three times a day before meals  predniSONE   Tablet 5 milliGRAM(s) Oral daily  sulfaSALAzine 1000 milliGRAM(s) Oral two times a day    MEDICATIONS  (PRN):  acetaminophen     Tablet .. 650 milliGRAM(s) Oral every 6 hours PRN Temp greater or equal to 38C (100.4F), Mild Pain (1 - 3)  dextrose Oral Gel 15 Gram(s) Oral once PRN Blood Glucose LESS THAN 70 milliGRAM(s)/deciliter  dextrose Oral Gel 15 Gram(s) Oral once PRN Blood Glucose LESS THAN 70 milliGRAM(s)/deciliter    ASSESSMENT / RECOMMENDATIONS    Ms. Minor is a 73-year-old female with a past medical history of type 2 diabetes mellitus, hypertension, hyperlipidemia, chronic kidney disease 3a, NSCLC adenocarcinoma with mets to brain (s/p RT x1 07/2023, s/p chemo x2 (last tx 11/16/23), rheumatoid arthritis and right hip replacement who presented to [facility] on 12/26/23 with persist dysuria and polyuria. Labs were also significant for (+) influenza. She was for further management of urinary tract infection and influenza. Endocrinology was consulted for recommendations regarding management of diabetes.     A1C: 11.5 %  BUN: 21  Creatinine: 1.16  GFR: 49  Weight: 62.1  BMI: 24.3    # Type 2 diabetes mellitus with hyperglycemia  - pt will remain on prednisone 5  - Please keep lantus 22  units at bedtime.   - Keep lispro  10  units before each meal.  - Continue lispro moderate dose sliding scale before meals and at bedtime.  - Patient's fingerstick glucose goal is 100-180 mg/dL.    - Discharge recommendations: resume home meds Lantus 17 units at bedtime + januvia 100mg daily, start Pioglitazone 15 daily, discontinue Farxiga in setting of recurrent UTI  - Patient can follow up at discharge with [facility] by calling [phone] to make an appointment.       Case discussed with Dr. Asencio. Primary team updated.       Alxi Harmon  Endocrinology Fellow    Service Pager: [phone]    SUBJECTIVE / INTERVAL HPI: Patient was seen and examined this morning.     Overnight events:  pt reports feeling well  she denies fevers, chills, nausea, abdominal pain    12/28: lantus 15 + lispro 6  12/29: lanrus 20 + lispro 6  12/31: lantus 22 + lispro 10     CAPILLARY BLOOD GLUCOSE & INSULIN RECEIVED  103 mg/dL (12-31 @ 22:23)  139 mg/dL (01-01 @ 09:04)  182 mg/dL (01-01 @ 13:18)  135 mg/dL (01-01 @ 17:53) 10   127 mg/dL (01-01 @ 21:43) 22  103 mg/dL (01-02 @ 09:17) 10      REVIEW OF SYSTEMS  Constitutional:  Negative fever, chills or loss of appetite.  Eyes:  Negative blurry vision or double vision.  Cardiovascular:  Negative for chest pain or palpitations.  Respiratory:  Negative for cough, wheezing, or shortness of breath.    Gastrointestinal:  Negative for nausea, vomiting, diarrhea, constipation, or abdominal pain.  Genitourinary:  Negative frequency, urgency or dysuria.  Neurologic:  No headache, confusion, dizziness, lightheadedness.    PHYSICAL EXAM  Vital Signs Last 24 Hrs  T(C): 36.6 (02 Jan 2024 05:02), Max: 36.7 (01 Jan 2024 12:07)  T(F): 97.9 (02 Jan 2024 05:02), Max: 98.1 (01 Jan 2024 12:07)  HR: 69 (02 Jan 2024 05:02) (69 - 74)  BP: 143/79 (02 Jan 2024 05:02) (116/70 - 143/79)  BP(mean): --  RR: 18 (02 Jan 2024 05:02) (17 - 18)  SpO2: 98% (02 Jan 2024 05:02) (95% - 98%)    Parameters below as of 02 Jan 2024 05:02  Patient On (Oxygen Delivery Method): room air        Constitutional: Awake, alert, in no acute distress.   HEENT: Normocephalic, atraumatic, NEDRA.  Respiratory: Lungs clear to ausculation bilaterally.   Cardiovascular: regular rhythm, normal S1 and S2, no audible murmurs.   GI: soft, non-tender, non-distended, bowel sounds present.  Extremities: No lower extremity edema.  Psychiatric: AAO x 3. Normal affect/mood.     LABS  CBC - WBC/HGB/HTC/PLT: 6.00/10.7/34.7/164 (01-02-24)  BMP - Na/K/Cl/Bicarb/BUN/Cr/Gluc/AG/eGFR: 135/4.1/104/22/21/1.16/81/9/49 (01-02-24)  Ca - 8.8 (01-02-24)  Phos - 3.6 (01-02-24)  Mg - 1.8 (01-02-24)  LFT - Alb/Tprot/Tbili/Dbili/AlkPhos/ALT/AST: 2.7/--/0.2/--/92/8/17 (12-31-23)    Thyroid Stimulating Hormone, Serum: 0.473 (11-16-23)          MEDICATIONS  MEDICATIONS  (STANDING):  amLODIPine   Tablet 10 milliGRAM(s) Oral daily  atorvastatin 40 milliGRAM(s) Oral at bedtime  cyanocobalamin 1000 MICROGram(s) Oral daily  dextrose 5%. 1000 milliLiter(s) (50 mL/Hr) IV Continuous <Continuous>  dextrose 5%. 1000 milliLiter(s) (100 mL/Hr) IV Continuous <Continuous>  dextrose 5%. 1000 milliLiter(s) (100 mL/Hr) IV Continuous <Continuous>  dextrose 5%. 1000 milliLiter(s) (50 mL/Hr) IV Continuous <Continuous>  dextrose 50% Injectable 25 Gram(s) IV Push once  dextrose 50% Injectable 25 Gram(s) IV Push once  dextrose 50% Injectable 12.5 Gram(s) IV Push once  dextrose 50% Injectable 25 Gram(s) IV Push once  dextrose 50% Injectable 25 Gram(s) IV Push once  dextrose 50% Injectable 12.5 Gram(s) IV Push once  folic acid 1 milliGRAM(s) Oral daily  glucagon  Injectable 1 milliGRAM(s) IntraMuscular once  glucagon  Injectable 1 milliGRAM(s) IntraMuscular once  heparin   Injectable 5000 Unit(s) SubCutaneous every 8 hours  hydroxychloroquine 200 milliGRAM(s) Oral two times a day  influenza  Vaccine (HIGH DOSE) 0.7 milliLiter(s) IntraMuscular once  insulin glargine Injectable (LANTUS) 22 Unit(s) SubCutaneous at bedtime  insulin lispro (ADMELOG) corrective regimen sliding scale   SubCutaneous Before meals and at bedtime  insulin lispro Injectable (ADMELOG) 10 Unit(s) SubCutaneous three times a day before meals  predniSONE   Tablet 5 milliGRAM(s) Oral daily  sulfaSALAzine 1000 milliGRAM(s) Oral two times a day    MEDICATIONS  (PRN):  acetaminophen     Tablet .. 650 milliGRAM(s) Oral every 6 hours PRN Temp greater or equal to 38C (100.4F), Mild Pain (1 - 3)  dextrose Oral Gel 15 Gram(s) Oral once PRN Blood Glucose LESS THAN 70 milliGRAM(s)/deciliter  dextrose Oral Gel 15 Gram(s) Oral once PRN Blood Glucose LESS THAN 70 milliGRAM(s)/deciliter    ASSESSMENT / RECOMMENDATIONS    Ms. Minor is a 73-year-old female with a past medical history of type 2 diabetes mellitus, hypertension, hyperlipidemia, chronic kidney disease 3a, NSCLC adenocarcinoma with mets to brain (s/p RT x1 07/2023, s/p chemo x2 (last tx 11/16/23), rheumatoid arthritis and right hip replacement who presented to [facility] on 12/26/23 with persist dysuria and polyuria. Labs were also significant for (+) influenza. She was for further management of urinary tract infection and influenza. Endocrinology was consulted for recommendations regarding management of diabetes.     A1C: 11.5 %  BUN: 21  Creatinine: 1.16  GFR: 49  Weight: 62.1  BMI: 24.3    # Type 2 diabetes mellitus with hyperglycemia  - pt will remain on prednisone 5  - Please keep lantus 22  units at bedtime.   - Keep lispro  10  units before each meal.  - Continue lispro moderate dose sliding scale before meals and at bedtime.  - Patient's fingerstick glucose goal is 100-180 mg/dL.    - Discharge recommendations: resume home meds Lantus 17 units at bedtime + januvia 100mg daily, start Pioglitazone 15 daily, discontinue Farxiga in setting of recurrent UTI  - Patient can follow up at discharge with [facility] by calling [phone] to make an appointment.       Case discussed with Dr. Asencio. Primary team updated.       Alix Harmon  Endocrinology Fellow    Service Pager: [phone]    SUBJECTIVE / INTERVAL HPI: Patient was seen and examined this morning.     Overnight events:  pt reports feeling well  she denies fevers, chills, nausea, abdominal pain    12/28: lantus 15 + lispro 6  12/29: lanrus 20 + lispro 6  12/31: lantus 22 + lispro 10     CAPILLARY BLOOD GLUCOSE & INSULIN RECEIVED  103 mg/dL (12-31 @ 22:23)  139 mg/dL (01-01 @ 09:04)  182 mg/dL (01-01 @ 13:18)  135 mg/dL (01-01 @ 17:53) 10   127 mg/dL (01-01 @ 21:43) 22  103 mg/dL (01-02 @ 09:17) 10      REVIEW OF SYSTEMS  Constitutional:  Negative fever, chills or loss of appetite.  Eyes:  Negative blurry vision or double vision.  Cardiovascular:  Negative for chest pain or palpitations.  Respiratory:  Negative for cough, wheezing, or shortness of breath.    Gastrointestinal:  Negative for nausea, vomiting, diarrhea, constipation, or abdominal pain.  Genitourinary:  Negative frequency, urgency or dysuria.  Neurologic:  No headache, confusion, dizziness, lightheadedness.    PHYSICAL EXAM  Vital Signs Last 24 Hrs  T(C): 36.6 (02 Jan 2024 05:02), Max: 36.7 (01 Jan 2024 12:07)  T(F): 97.9 (02 Jan 2024 05:02), Max: 98.1 (01 Jan 2024 12:07)  HR: 69 (02 Jan 2024 05:02) (69 - 74)  BP: 143/79 (02 Jan 2024 05:02) (116/70 - 143/79)  BP(mean): --  RR: 18 (02 Jan 2024 05:02) (17 - 18)  SpO2: 98% (02 Jan 2024 05:02) (95% - 98%)    Parameters below as of 02 Jan 2024 05:02  Patient On (Oxygen Delivery Method): room air        Constitutional: Awake, alert, in no acute distress.   HEENT: Normocephalic, atraumatic, NEDRA.  Respiratory: Lungs clear to ausculation bilaterally.   Cardiovascular: regular rhythm, normal S1 and S2, no audible murmurs.   GI: soft, non-tender, non-distended, bowel sounds present.  Extremities: No lower extremity edema.  Psychiatric: AAO x 3. Normal affect/mood.     LABS  CBC - WBC/HGB/HTC/PLT: 6.00/10.7/34.7/164 (01-02-24)  BMP - Na/K/Cl/Bicarb/BUN/Cr/Gluc/AG/eGFR: 135/4.1/104/22/21/1.16/81/9/49 (01-02-24)  Ca - 8.8 (01-02-24)  Phos - 3.6 (01-02-24)  Mg - 1.8 (01-02-24)  LFT - Alb/Tprot/Tbili/Dbili/AlkPhos/ALT/AST: 2.7/--/0.2/--/92/8/17 (12-31-23)    Thyroid Stimulating Hormone, Serum: 0.473 (11-16-23)          MEDICATIONS  MEDICATIONS  (STANDING):  amLODIPine   Tablet 10 milliGRAM(s) Oral daily  atorvastatin 40 milliGRAM(s) Oral at bedtime  cyanocobalamin 1000 MICROGram(s) Oral daily  dextrose 5%. 1000 milliLiter(s) (50 mL/Hr) IV Continuous <Continuous>  dextrose 5%. 1000 milliLiter(s) (100 mL/Hr) IV Continuous <Continuous>  dextrose 5%. 1000 milliLiter(s) (100 mL/Hr) IV Continuous <Continuous>  dextrose 5%. 1000 milliLiter(s) (50 mL/Hr) IV Continuous <Continuous>  dextrose 50% Injectable 25 Gram(s) IV Push once  dextrose 50% Injectable 25 Gram(s) IV Push once  dextrose 50% Injectable 12.5 Gram(s) IV Push once  dextrose 50% Injectable 25 Gram(s) IV Push once  dextrose 50% Injectable 25 Gram(s) IV Push once  dextrose 50% Injectable 12.5 Gram(s) IV Push once  folic acid 1 milliGRAM(s) Oral daily  glucagon  Injectable 1 milliGRAM(s) IntraMuscular once  glucagon  Injectable 1 milliGRAM(s) IntraMuscular once  heparin   Injectable 5000 Unit(s) SubCutaneous every 8 hours  hydroxychloroquine 200 milliGRAM(s) Oral two times a day  influenza  Vaccine (HIGH DOSE) 0.7 milliLiter(s) IntraMuscular once  insulin glargine Injectable (LANTUS) 22 Unit(s) SubCutaneous at bedtime  insulin lispro (ADMELOG) corrective regimen sliding scale   SubCutaneous Before meals and at bedtime  insulin lispro Injectable (ADMELOG) 10 Unit(s) SubCutaneous three times a day before meals  predniSONE   Tablet 5 milliGRAM(s) Oral daily  sulfaSALAzine 1000 milliGRAM(s) Oral two times a day    MEDICATIONS  (PRN):  acetaminophen     Tablet .. 650 milliGRAM(s) Oral every 6 hours PRN Temp greater or equal to 38C (100.4F), Mild Pain (1 - 3)  dextrose Oral Gel 15 Gram(s) Oral once PRN Blood Glucose LESS THAN 70 milliGRAM(s)/deciliter  dextrose Oral Gel 15 Gram(s) Oral once PRN Blood Glucose LESS THAN 70 milliGRAM(s)/deciliter    ASSESSMENT / RECOMMENDATIONS    Ms. Minor is a 73-year-old female with a past medical history of type 2 diabetes mellitus, hypertension, hyperlipidemia, chronic kidney disease 3a, NSCLC adenocarcinoma with mets to brain (s/p RT x1 07/2023, s/p chemo x2 (last tx 11/16/23), rheumatoid arthritis and right hip replacement who presented to [facility] on 12/26/23 with persist dysuria and polyuria. Labs were also significant for (+) influenza. She was for further management of urinary tract infection and influenza. Endocrinology was consulted for recommendations regarding management of diabetes.     A1C: 11.5 %  BUN: 21  Creatinine: 1.16  GFR: 49  Weight: 62.1  BMI: 24.3    # Type 2 diabetes mellitus with hyperglycemia  - pt will remain on prednisone 5  - Please keep lantus 20  units at bedtime.   - Keep lispro  6  units before each meal.  - Continue lispro moderate dose sliding scale before meals and at bedtime.  - Patient's fingerstick glucose goal is 100-180 mg/dL.    - Discharge recommendations: resume home meds Lantus 17 units at bedtime + januvia 100mg daily, start Pioglitazone 15 daily, discontinue Farxiga in setting of recurrent UTI  - Patient can follow up at discharge with [facility] by calling [phone] to make an appointment.       Case discussed with Dr. Asencio. Primary team updated.       Alix Harmon  Endocrinology Fellow    Service Pager: [phone]    SUBJECTIVE / INTERVAL HPI: Patient was seen and examined this morning.     Overnight events:  pt reports feeling well  she denies fevers, chills, nausea, abdominal pain    12/28: lantus 15 + lispro 6  12/29: lanrus 20 + lispro 6  12/31: lantus 22 + lispro 10     CAPILLARY BLOOD GLUCOSE & INSULIN RECEIVED  103 mg/dL (12-31 @ 22:23)  139 mg/dL (01-01 @ 09:04)  182 mg/dL (01-01 @ 13:18)  135 mg/dL (01-01 @ 17:53) 10   127 mg/dL (01-01 @ 21:43) 22  103 mg/dL (01-02 @ 09:17) 10      REVIEW OF SYSTEMS  Constitutional:  Negative fever, chills or loss of appetite.  Eyes:  Negative blurry vision or double vision.  Cardiovascular:  Negative for chest pain or palpitations.  Respiratory:  Negative for cough, wheezing, or shortness of breath.    Gastrointestinal:  Negative for nausea, vomiting, diarrhea, constipation, or abdominal pain.  Genitourinary:  Negative frequency, urgency or dysuria.  Neurologic:  No headache, confusion, dizziness, lightheadedness.    PHYSICAL EXAM  Vital Signs Last 24 Hrs  T(C): 36.6 (02 Jan 2024 05:02), Max: 36.7 (01 Jan 2024 12:07)  T(F): 97.9 (02 Jan 2024 05:02), Max: 98.1 (01 Jan 2024 12:07)  HR: 69 (02 Jan 2024 05:02) (69 - 74)  BP: 143/79 (02 Jan 2024 05:02) (116/70 - 143/79)  BP(mean): --  RR: 18 (02 Jan 2024 05:02) (17 - 18)  SpO2: 98% (02 Jan 2024 05:02) (95% - 98%)    Parameters below as of 02 Jan 2024 05:02  Patient On (Oxygen Delivery Method): room air        Constitutional: Awake, alert, in no acute distress.   HEENT: Normocephalic, atraumatic, NEDRA.  Respiratory: Lungs clear to ausculation bilaterally.   Cardiovascular: regular rhythm, normal S1 and S2, no audible murmurs.   GI: soft, non-tender, non-distended, bowel sounds present.  Extremities: No lower extremity edema.  Psychiatric: AAO x 3. Normal affect/mood.     LABS  CBC - WBC/HGB/HTC/PLT: 6.00/10.7/34.7/164 (01-02-24)  BMP - Na/K/Cl/Bicarb/BUN/Cr/Gluc/AG/eGFR: 135/4.1/104/22/21/1.16/81/9/49 (01-02-24)  Ca - 8.8 (01-02-24)  Phos - 3.6 (01-02-24)  Mg - 1.8 (01-02-24)  LFT - Alb/Tprot/Tbili/Dbili/AlkPhos/ALT/AST: 2.7/--/0.2/--/92/8/17 (12-31-23)    Thyroid Stimulating Hormone, Serum: 0.473 (11-16-23)          MEDICATIONS  MEDICATIONS  (STANDING):  amLODIPine   Tablet 10 milliGRAM(s) Oral daily  atorvastatin 40 milliGRAM(s) Oral at bedtime  cyanocobalamin 1000 MICROGram(s) Oral daily  dextrose 5%. 1000 milliLiter(s) (50 mL/Hr) IV Continuous <Continuous>  dextrose 5%. 1000 milliLiter(s) (100 mL/Hr) IV Continuous <Continuous>  dextrose 5%. 1000 milliLiter(s) (100 mL/Hr) IV Continuous <Continuous>  dextrose 5%. 1000 milliLiter(s) (50 mL/Hr) IV Continuous <Continuous>  dextrose 50% Injectable 25 Gram(s) IV Push once  dextrose 50% Injectable 25 Gram(s) IV Push once  dextrose 50% Injectable 12.5 Gram(s) IV Push once  dextrose 50% Injectable 25 Gram(s) IV Push once  dextrose 50% Injectable 25 Gram(s) IV Push once  dextrose 50% Injectable 12.5 Gram(s) IV Push once  folic acid 1 milliGRAM(s) Oral daily  glucagon  Injectable 1 milliGRAM(s) IntraMuscular once  glucagon  Injectable 1 milliGRAM(s) IntraMuscular once  heparin   Injectable 5000 Unit(s) SubCutaneous every 8 hours  hydroxychloroquine 200 milliGRAM(s) Oral two times a day  influenza  Vaccine (HIGH DOSE) 0.7 milliLiter(s) IntraMuscular once  insulin glargine Injectable (LANTUS) 22 Unit(s) SubCutaneous at bedtime  insulin lispro (ADMELOG) corrective regimen sliding scale   SubCutaneous Before meals and at bedtime  insulin lispro Injectable (ADMELOG) 10 Unit(s) SubCutaneous three times a day before meals  predniSONE   Tablet 5 milliGRAM(s) Oral daily  sulfaSALAzine 1000 milliGRAM(s) Oral two times a day    MEDICATIONS  (PRN):  acetaminophen     Tablet .. 650 milliGRAM(s) Oral every 6 hours PRN Temp greater or equal to 38C (100.4F), Mild Pain (1 - 3)  dextrose Oral Gel 15 Gram(s) Oral once PRN Blood Glucose LESS THAN 70 milliGRAM(s)/deciliter  dextrose Oral Gel 15 Gram(s) Oral once PRN Blood Glucose LESS THAN 70 milliGRAM(s)/deciliter    ASSESSMENT / RECOMMENDATIONS    Ms. Minor is a 73-year-old female with a past medical history of type 2 diabetes mellitus, hypertension, hyperlipidemia, chronic kidney disease 3a, NSCLC adenocarcinoma with mets to brain (s/p RT x1 07/2023, s/p chemo x2 (last tx 11/16/23), rheumatoid arthritis and right hip replacement who presented to [facility] on 12/26/23 with persist dysuria and polyuria. Labs were also significant for (+) influenza. She was for further management of urinary tract infection and influenza. Endocrinology was consulted for recommendations regarding management of diabetes.     A1C: 11.5 %  BUN: 21  Creatinine: 1.16  GFR: 49  Weight: 62.1  BMI: 24.3    # Type 2 diabetes mellitus with hyperglycemia  - pt will remain on prednisone 5  - Please keep lantus 20  units at bedtime.   - Keep lispro  6  units before each meal.  - Continue lispro moderate dose sliding scale before meals and at bedtime.  - Patient's fingerstick glucose goal is 100-180 mg/dL.    - Discharge recommendations: resume home meds Lantus 17 units at bedtime + januvia 100mg daily, start Pioglitazone 15 daily, discontinue Farxiga in setting of recurrent UTI  - Patient can follow up at discharge with [facility] by calling [phone] to make an appointment.       Case discussed with Dr. Asencio. Primary team updated.       Alix Harmon  Endocrinology Fellow    Service Pager: [phone]    SUBJECTIVE / INTERVAL HPI: Patient was seen and examined this morning.     Overnight events:  pt reports feeling well  she denies fevers, chills, nausea, abdominal pain    12/28: lantus 15 + lispro 6  12/29: lanrus 20 + lispro 6  12/31: lantus 22 + lispro 10     CAPILLARY BLOOD GLUCOSE & INSULIN RECEIVED  103 mg/dL (12-31 @ 22:23)  139 mg/dL (01-01 @ 09:04)  182 mg/dL (01-01 @ 13:18)  135 mg/dL (01-01 @ 17:53) 10   127 mg/dL (01-01 @ 21:43) 22  103 mg/dL (01-02 @ 09:17) 10      REVIEW OF SYSTEMS  Constitutional:  Negative fever, chills or loss of appetite.  Eyes:  Negative blurry vision or double vision.  Cardiovascular:  Negative for chest pain or palpitations.  Respiratory:  Negative for cough, wheezing, or shortness of breath.    Gastrointestinal:  Negative for nausea, vomiting, diarrhea, constipation, or abdominal pain.  Genitourinary:  Negative frequency, urgency or dysuria.  Neurologic:  No headache, confusion, dizziness, lightheadedness.    PHYSICAL EXAM  Vital Signs Last 24 Hrs  T(C): 36.6 (02 Jan 2024 05:02), Max: 36.7 (01 Jan 2024 12:07)  T(F): 97.9 (02 Jan 2024 05:02), Max: 98.1 (01 Jan 2024 12:07)  HR: 69 (02 Jan 2024 05:02) (69 - 74)  BP: 143/79 (02 Jan 2024 05:02) (116/70 - 143/79)  BP(mean): --  RR: 18 (02 Jan 2024 05:02) (17 - 18)  SpO2: 98% (02 Jan 2024 05:02) (95% - 98%)    Parameters below as of 02 Jan 2024 05:02  Patient On (Oxygen Delivery Method): room air        Constitutional: Awake, alert, in no acute distress.   HEENT: Normocephalic, atraumatic, NEDRA.  Respiratory: Lungs clear to ausculation bilaterally.   Cardiovascular: regular rhythm, normal S1 and S2, no audible murmurs.   GI: soft, non-tender, non-distended, bowel sounds present.  Extremities: No lower extremity edema.  Psychiatric: AAO x 3. Normal affect/mood.     LABS  CBC - WBC/HGB/HTC/PLT: 6.00/10.7/34.7/164 (01-02-24)  BMP - Na/K/Cl/Bicarb/BUN/Cr/Gluc/AG/eGFR: 135/4.1/104/22/21/1.16/81/9/49 (01-02-24)  Ca - 8.8 (01-02-24)  Phos - 3.6 (01-02-24)  Mg - 1.8 (01-02-24)  LFT - Alb/Tprot/Tbili/Dbili/AlkPhos/ALT/AST: 2.7/--/0.2/--/92/8/17 (12-31-23)    Thyroid Stimulating Hormone, Serum: 0.473 (11-16-23)          MEDICATIONS  MEDICATIONS  (STANDING):  amLODIPine   Tablet 10 milliGRAM(s) Oral daily  atorvastatin 40 milliGRAM(s) Oral at bedtime  cyanocobalamin 1000 MICROGram(s) Oral daily  dextrose 5%. 1000 milliLiter(s) (50 mL/Hr) IV Continuous <Continuous>  dextrose 5%. 1000 milliLiter(s) (100 mL/Hr) IV Continuous <Continuous>  dextrose 5%. 1000 milliLiter(s) (100 mL/Hr) IV Continuous <Continuous>  dextrose 5%. 1000 milliLiter(s) (50 mL/Hr) IV Continuous <Continuous>  dextrose 50% Injectable 25 Gram(s) IV Push once  dextrose 50% Injectable 25 Gram(s) IV Push once  dextrose 50% Injectable 12.5 Gram(s) IV Push once  dextrose 50% Injectable 25 Gram(s) IV Push once  dextrose 50% Injectable 25 Gram(s) IV Push once  dextrose 50% Injectable 12.5 Gram(s) IV Push once  folic acid 1 milliGRAM(s) Oral daily  glucagon  Injectable 1 milliGRAM(s) IntraMuscular once  glucagon  Injectable 1 milliGRAM(s) IntraMuscular once  heparin   Injectable 5000 Unit(s) SubCutaneous every 8 hours  hydroxychloroquine 200 milliGRAM(s) Oral two times a day  influenza  Vaccine (HIGH DOSE) 0.7 milliLiter(s) IntraMuscular once  insulin glargine Injectable (LANTUS) 22 Unit(s) SubCutaneous at bedtime  insulin lispro (ADMELOG) corrective regimen sliding scale   SubCutaneous Before meals and at bedtime  insulin lispro Injectable (ADMELOG) 10 Unit(s) SubCutaneous three times a day before meals  predniSONE   Tablet 5 milliGRAM(s) Oral daily  sulfaSALAzine 1000 milliGRAM(s) Oral two times a day    MEDICATIONS  (PRN):  acetaminophen     Tablet .. 650 milliGRAM(s) Oral every 6 hours PRN Temp greater or equal to 38C (100.4F), Mild Pain (1 - 3)  dextrose Oral Gel 15 Gram(s) Oral once PRN Blood Glucose LESS THAN 70 milliGRAM(s)/deciliter  dextrose Oral Gel 15 Gram(s) Oral once PRN Blood Glucose LESS THAN 70 milliGRAM(s)/deciliter    ASSESSMENT / RECOMMENDATIONS    Ms. Minor is a 73-year-old female with a past medical history of type 2 diabetes mellitus, hypertension, hyperlipidemia, chronic kidney disease 3a, NSCLC adenocarcinoma with mets to brain (s/p RT x1 07/2023, s/p chemo x2 (last tx 11/16/23), rheumatoid arthritis and right hip replacement who presented to [facility] on 12/26/23 with persist dysuria and polyuria. Labs were also significant for (+) influenza. She was for further management of urinary tract infection and influenza. Endocrinology was consulted for recommendations regarding management of diabetes.     A1C: 11.5 %  BUN: 21  Creatinine: 1.16  GFR: 49  Weight: 62.1  BMI: 24.3    # Type 2 diabetes mellitus with hyperglycemia  - pt will remain on prednisone 5  - Please keep lantus 17  units at bedtime.   - Keep lispro  6  units before each meal.  - Continue lispro moderate dose sliding scale before meals and at bedtime.  - Patient's fingerstick glucose goal is 100-180 mg/dL.    - Discharge recommendations: resume home meds Lantus 17 units at bedtime + januvia 100mg daily, start Pioglitazone 15 daily, discontinue Farxiga in setting of recurrent UTI  - Patient can follow up at discharge with [facility] by calling [phone] to make an appointment.       Case discussed with Dr. Asencio. Primary team updated.       Alix Harmon  Endocrinology Fellow    Service Pager: [phone]    SUBJECTIVE / INTERVAL HPI: Patient was seen and examined this morning.     Overnight events:  pt reports feeling well  she denies fevers, chills, nausea, abdominal pain    12/28: lantus 15 + lispro 6  12/29: lanrus 20 + lispro 6  12/31: lantus 22 + lispro 10     CAPILLARY BLOOD GLUCOSE & INSULIN RECEIVED  103 mg/dL (12-31 @ 22:23)  139 mg/dL (01-01 @ 09:04)  182 mg/dL (01-01 @ 13:18)  135 mg/dL (01-01 @ 17:53) 10   127 mg/dL (01-01 @ 21:43) 22  103 mg/dL (01-02 @ 09:17) 10      REVIEW OF SYSTEMS  Constitutional:  Negative fever, chills or loss of appetite.  Eyes:  Negative blurry vision or double vision.  Cardiovascular:  Negative for chest pain or palpitations.  Respiratory:  Negative for cough, wheezing, or shortness of breath.    Gastrointestinal:  Negative for nausea, vomiting, diarrhea, constipation, or abdominal pain.  Genitourinary:  Negative frequency, urgency or dysuria.  Neurologic:  No headache, confusion, dizziness, lightheadedness.    PHYSICAL EXAM  Vital Signs Last 24 Hrs  T(C): 36.6 (02 Jan 2024 05:02), Max: 36.7 (01 Jan 2024 12:07)  T(F): 97.9 (02 Jan 2024 05:02), Max: 98.1 (01 Jan 2024 12:07)  HR: 69 (02 Jan 2024 05:02) (69 - 74)  BP: 143/79 (02 Jan 2024 05:02) (116/70 - 143/79)  BP(mean): --  RR: 18 (02 Jan 2024 05:02) (17 - 18)  SpO2: 98% (02 Jan 2024 05:02) (95% - 98%)    Parameters below as of 02 Jan 2024 05:02  Patient On (Oxygen Delivery Method): room air        Constitutional: Awake, alert, in no acute distress.   HEENT: Normocephalic, atraumatic, NEDRA.  Respiratory: Lungs clear to ausculation bilaterally.   Cardiovascular: regular rhythm, normal S1 and S2, no audible murmurs.   GI: soft, non-tender, non-distended, bowel sounds present.  Extremities: No lower extremity edema.  Psychiatric: AAO x 3. Normal affect/mood.     LABS  CBC - WBC/HGB/HTC/PLT: 6.00/10.7/34.7/164 (01-02-24)  BMP - Na/K/Cl/Bicarb/BUN/Cr/Gluc/AG/eGFR: 135/4.1/104/22/21/1.16/81/9/49 (01-02-24)  Ca - 8.8 (01-02-24)  Phos - 3.6 (01-02-24)  Mg - 1.8 (01-02-24)  LFT - Alb/Tprot/Tbili/Dbili/AlkPhos/ALT/AST: 2.7/--/0.2/--/92/8/17 (12-31-23)    Thyroid Stimulating Hormone, Serum: 0.473 (11-16-23)          MEDICATIONS  MEDICATIONS  (STANDING):  amLODIPine   Tablet 10 milliGRAM(s) Oral daily  atorvastatin 40 milliGRAM(s) Oral at bedtime  cyanocobalamin 1000 MICROGram(s) Oral daily  dextrose 5%. 1000 milliLiter(s) (50 mL/Hr) IV Continuous <Continuous>  dextrose 5%. 1000 milliLiter(s) (100 mL/Hr) IV Continuous <Continuous>  dextrose 5%. 1000 milliLiter(s) (100 mL/Hr) IV Continuous <Continuous>  dextrose 5%. 1000 milliLiter(s) (50 mL/Hr) IV Continuous <Continuous>  dextrose 50% Injectable 25 Gram(s) IV Push once  dextrose 50% Injectable 25 Gram(s) IV Push once  dextrose 50% Injectable 12.5 Gram(s) IV Push once  dextrose 50% Injectable 25 Gram(s) IV Push once  dextrose 50% Injectable 25 Gram(s) IV Push once  dextrose 50% Injectable 12.5 Gram(s) IV Push once  folic acid 1 milliGRAM(s) Oral daily  glucagon  Injectable 1 milliGRAM(s) IntraMuscular once  glucagon  Injectable 1 milliGRAM(s) IntraMuscular once  heparin   Injectable 5000 Unit(s) SubCutaneous every 8 hours  hydroxychloroquine 200 milliGRAM(s) Oral two times a day  influenza  Vaccine (HIGH DOSE) 0.7 milliLiter(s) IntraMuscular once  insulin glargine Injectable (LANTUS) 22 Unit(s) SubCutaneous at bedtime  insulin lispro (ADMELOG) corrective regimen sliding scale   SubCutaneous Before meals and at bedtime  insulin lispro Injectable (ADMELOG) 10 Unit(s) SubCutaneous three times a day before meals  predniSONE   Tablet 5 milliGRAM(s) Oral daily  sulfaSALAzine 1000 milliGRAM(s) Oral two times a day    MEDICATIONS  (PRN):  acetaminophen     Tablet .. 650 milliGRAM(s) Oral every 6 hours PRN Temp greater or equal to 38C (100.4F), Mild Pain (1 - 3)  dextrose Oral Gel 15 Gram(s) Oral once PRN Blood Glucose LESS THAN 70 milliGRAM(s)/deciliter  dextrose Oral Gel 15 Gram(s) Oral once PRN Blood Glucose LESS THAN 70 milliGRAM(s)/deciliter    ASSESSMENT / RECOMMENDATIONS    Ms. Minor is a 73-year-old female with a past medical history of type 2 diabetes mellitus, hypertension, hyperlipidemia, chronic kidney disease 3a, NSCLC adenocarcinoma with mets to brain (s/p RT x1 07/2023, s/p chemo x2 (last tx 11/16/23), rheumatoid arthritis and right hip replacement who presented to [facility] on 12/26/23 with persist dysuria and polyuria. Labs were also significant for (+) influenza. She was for further management of urinary tract infection and influenza. Endocrinology was consulted for recommendations regarding management of diabetes.     A1C: 11.5 %  BUN: 21  Creatinine: 1.16  GFR: 49  Weight: 62.1  BMI: 24.3    # Type 2 diabetes mellitus with hyperglycemia  - pt will remain on prednisone 5  - Please keep lantus 17  units at bedtime.   - Keep lispro  6  units before each meal.  - Continue lispro moderate dose sliding scale before meals and at bedtime.  - Patient's fingerstick glucose goal is 100-180 mg/dL.    - Discharge recommendations: resume home meds Lantus 17 units at bedtime + januvia 100mg daily, start Pioglitazone 15 daily, discontinue Farxiga in setting of recurrent UTI  - Patient can follow up at discharge with [facility] by calling [phone] to make an appointment.       Case discussed with Dr. Asencio. Primary team updated.       Alix Harmon  Endocrinology Fellow    Service Pager: [phone]

## 2024-01-02 NOTE — PROGRESS NOTE ADULT - PROBLEM SELECTOR PLAN 9
Home meds losartan 100mg, amlodipine 10mg  - c/w amlodipine 10mg  - hold losartan 100mg iso DUNIA

## 2024-01-02 NOTE — DISCHARGE NOTE NURSING/CASE MANAGEMENT/SOCIAL WORK - PATIENT PORTAL LINK FT
You can access the FollowMyHealth Patient Portal offered by [facility] by registering at the following website: [email or web address]. By joining [employer]’s FollowMyHealth portal, you will also be able to view your health information using other applications (apps) compatible with our system. You can access the FollowMyHealth Patient Portal offered by [facility] by registering at the following website: [email or web address]. By joining [employer]’s FollowMyHealth portal, you will also be able to view your health information using other applications (apps) compatible with our system.

## 2024-01-02 NOTE — PROGRESS NOTE ADULT - PROBLEM SELECTOR PLAN 5
Admission Cr 1.23 (Baseline Cr 0.89-1.01)   s/p 1L LR  - ctreining 1.09  - Cystatin C 1.77  - hold losartan 100mg due to increase Cr

## 2024-01-02 NOTE — PROGRESS NOTE ADULT - ATTENDING COMMENTS
This patient with history of non-small-cell lung cancer was admitted with Glucose level 500 mg% and Hgb A1C 11.5%. She has UTI with Klebsiella. Glucoses now were 135-127 last night and today 103-200 mg%. I agree with continuing 17 units Lantus Insulin and Januvia 100 mg daily but  D/C of Farxiga,
Pt seen on rounds this afternoon.  Had no new complaints.  Symptoms of RA have apparently remained under control on the daily Prednisone dose of 5 mg, and this should be continued.  (Had wanted an opinion on this from Rheumatology after her last visit, but she has not followed through with this).    Glucoses have been quite high since yesterday, at least part of this due to her dietary "lapse" last night with the Juan Jose's brought in by family.  Her AM fingerstick was also quite high at 331, however--unclear to what degree this reflects higher basal insulin needs vs delayed effects of the large evening meal (or possible snacking on leftovers in the middle of the night--she is unable to recall).  She is possibly going to be discharged later today.  --Will increase the Lantus to 20 units for tonight, but leave the premeal lispro at 6 units for now  --If discharged later today, will have her decrease the Lantus back to 17 units  --She has demonstrated previously that she cannot manage a premeal lispro regimen--likely due to her cognitive dysfunction, ?? willingness to follow through with the instructions).  Her oral regimen was previously Januvia plus Farxiga, but should not be continuing the SGLT-2 given her recurrent urosepsis.  Will try changing to pioglitazone at 15 mg/day.

## 2024-01-02 NOTE — PROGRESS NOTE ADULT - PROBLEM SELECTOR PROBLEM 8
Chronic renal failure, stage 3 (moderate), unspecified whether stage 3a or 3b CKD
Rheumatoid arthritis
Rheumatoid arthritis
Chronic renal failure, stage 3 (moderate), unspecified whether stage 3a or 3b CKD
Rheumatoid arthritis
Rheumatoid arthritis

## 2024-01-02 NOTE — PROGRESS NOTE ADULT - PROBLEM SELECTOR PLAN 10
Home meds ferrous sulfate 324mg qd  H/H stable  - c/w ferrous sulfate

## 2024-01-02 NOTE — PROGRESS NOTE ADULT - SUBJECTIVE AND OBJECTIVE BOX
**INCOMPLETE NOTE    OVERNIGHT EVENTS:    SUBJECTIVE:  Patient seen and examined at bedside.  ROS: Patient denies h/n/v/d, fever, chills, cp, palpitations, sob, abd pain, leg swelling, rashes, dysuria, and changes in BM.     Vital Signs Last 12 Hrs  T(F): 97.9 (01-02-24 @ 05:02), Max: 97.9 (01-02-24 @ 05:02)  HR: 69 (01-02-24 @ 05:02) (69 - 71)  BP: 143/79 (01-02-24 @ 05:02) (126/72 - 143/79)  BP(mean): --  RR: 18 (01-02-24 @ 05:02) (18 - 18)  SpO2: 98% (01-02-24 @ 05:02) (95% - 98%)  I&O's Summary      PHYSICAL EXAM:  Constitutional: NAD, comfortable in bed.  HEENT: NC/AT, PERRLA, EOMI, no conjunctival pallor or scleral icterus, MMM  Neck: Supple, no JVD  Respiratory: CTA B/L. No w/r/r.   Cardiovascular: RRR, normal S1 and S2, no m/r/g.   Gastrointestinal: +BS, soft NTND, no guarding or rebound tenderness, no palpable masses   Extremities: wwp; no cyanosis, clubbing or edema.   Vascular: Pulses equal and strong throughout.   Neurological: AAOx3, no CN deficits, strength and sensation intact throughout.   Skin: No gross skin abnormalities or rashes        LABS:                        10.7   6.00  )-----------( 164      ( 02 Jan 2024 05:30 )             34.7     01-01    133<L>  |  100  |  24<H>  ----------------------------<  136<H>  4.0   |  24  |  1.25    Ca    8.9      01 Jan 2024 05:30  Phos  3.5     01-01  Mg     2.1     01-01        Urinalysis Basic - ( 01 Jan 2024 05:30 )    Color: x / Appearance: x / SG: x / pH: x  Gluc: 136 mg/dL / Ketone: x  / Bili: x / Urobili: x   Blood: x / Protein: x / Nitrite: x   Leuk Esterase: x / RBC: x / WBC x   Sq Epi: x / Non Sq Epi: x / Bacteria: x          RADIOLOGY & ADDITIONAL TESTS:    MEDICATIONS  (STANDING):  amLODIPine   Tablet 10 milliGRAM(s) Oral daily  atorvastatin 40 milliGRAM(s) Oral at bedtime  cyanocobalamin 1000 MICROGram(s) Oral daily  dextrose 5%. 1000 milliLiter(s) (100 mL/Hr) IV Continuous <Continuous>  dextrose 5%. 1000 milliLiter(s) (50 mL/Hr) IV Continuous <Continuous>  dextrose 5%. 1000 milliLiter(s) (50 mL/Hr) IV Continuous <Continuous>  dextrose 5%. 1000 milliLiter(s) (100 mL/Hr) IV Continuous <Continuous>  dextrose 50% Injectable 25 Gram(s) IV Push once  dextrose 50% Injectable 12.5 Gram(s) IV Push once  dextrose 50% Injectable 12.5 Gram(s) IV Push once  dextrose 50% Injectable 25 Gram(s) IV Push once  dextrose 50% Injectable 25 Gram(s) IV Push once  dextrose 50% Injectable 25 Gram(s) IV Push once  folic acid 1 milliGRAM(s) Oral daily  glucagon  Injectable 1 milliGRAM(s) IntraMuscular once  glucagon  Injectable 1 milliGRAM(s) IntraMuscular once  heparin   Injectable 5000 Unit(s) SubCutaneous every 8 hours  hydroxychloroquine 200 milliGRAM(s) Oral two times a day  influenza  Vaccine (HIGH DOSE) 0.7 milliLiter(s) IntraMuscular once  insulin glargine Injectable (LANTUS) 22 Unit(s) SubCutaneous at bedtime  insulin lispro (ADMELOG) corrective regimen sliding scale   SubCutaneous Before meals and at bedtime  insulin lispro Injectable (ADMELOG) 10 Unit(s) SubCutaneous three times a day before meals  predniSONE   Tablet 5 milliGRAM(s) Oral daily  sulfaSALAzine 1000 milliGRAM(s) Oral two times a day    MEDICATIONS  (PRN):  acetaminophen     Tablet .. 650 milliGRAM(s) Oral every 6 hours PRN Temp greater or equal to 38C (100.4F), Mild Pain (1 - 3)  dextrose Oral Gel 15 Gram(s) Oral once PRN Blood Glucose LESS THAN 70 milliGRAM(s)/deciliter  dextrose Oral Gel 15 Gram(s) Oral once PRN Blood Glucose LESS THAN 70 milliGRAM(s)/deciliter

## 2024-01-02 NOTE — PROGRESS NOTE ADULT - NUTRITIONAL ASSESSMENT
This patient has been assessed with a concern for Malnutrition and has been determined to have a diagnosis/diagnoses of Severe protein-calorie malnutrition.    This patient is being managed with:   Diet Consistent Carbohydrate/No Snacks-  Supplement Feeding Modality:  Oral  Glucerna Shake Cans or Servings Per Day:  1       Frequency:  Daily  Entered: Dec 27 2023  2:07PM  

## 2024-01-02 NOTE — PROGRESS NOTE ADULT - PROBLEM SELECTOR PROBLEM 5
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
Chronic renal failure, stage 3 (moderate), unspecified whether stage 3a or 3b CKD
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

## 2024-01-02 NOTE — PROGRESS NOTE ADULT - PROBLEM SELECTOR PLAN 7
- elevated since 10/2024, LFTs wnl  No abdominal pain   C/f bone mets iso metastatic NSCLC  - GGT elevated; 75

## 2024-01-02 NOTE — PROGRESS NOTE ADULT - PROBLEM SELECTOR PROBLEM 3
HLD (hyperlipidemia)
HLD (hyperlipidemia)
Insulin dependent type 2 diabetes mellitus
HLD (hyperlipidemia)
HLD (hyperlipidemia)
Insulin dependent type 2 diabetes mellitus
HLD (hyperlipidemia)

## 2024-01-02 NOTE — PROGRESS NOTE ADULT - PROBLEM SELECTOR PROBLEM 4
Rheumatoid arthritis
Rheumatoid arthritis
Influenza A
Influenza A
Rheumatoid arthritis
Influenza A
Rheumatoid arthritis
Rheumatoid arthritis
Influenza A

## 2024-01-02 NOTE — DISCHARGE NOTE NURSING/CASE MANAGEMENT/SOCIAL WORK - NSDCFUADDAPPT_GEN_ALL_CORE_FT
Please follow up with:     Dr. Sedrick Delaney   [facility] [place]   [address], [address]  [phone]  1/9/24 at 11:30 AM     Dr Von Boyce  [facility]   [address] 8th [address]   [id number]   1/4/2024 @ 1:20pm   Please follow up with:     Dr. Sedrick Delaney   [facility]   [address], [address]  [phone]  1/9/24 at 11:30 AM     Dr Von Boyce  [facility]   [address] 8[address]   [id number]   1/4/2024 @ 1:20pm

## 2024-01-02 NOTE — PROGRESS NOTE ADULT - PROBLEM SELECTOR PLAN 12
Home meds lipitor 40  - c/w lipitor 40  - f/u lipid panel

## 2024-01-02 NOTE — PROGRESS NOTE ADULT - PROBLEM SELECTOR PLAN 1
Presenting with persistent dysuria and polyuria  Presented to [facility] ED 11/16 for UTI discharged on cefpodoxime x 14 days which pt completed  Ucx 11/16 and 12/14 showed 100k  klebsiella oxytoca/raoutella ornithinolytica resistant to CTX. Was taking farxiga, prednisone 5mg   Afebrile, not septic  Reports  - s/p ertapanem   - hold farxiga iso UTI Presenting with persistent dysuria and polyuria  Presented to [facility] ED 11/16 for UTI discharged on cefpodoxime x 14 days which pt completed  Ucx 11/16 and 12/14 showed 100k  klebsiella oxytoca/raoutella ornithinolytica resistant to CTX. Was taking farxiga, prednisone 5mg   Afebrile, not septic  Reports  - s/p ertapanem   - hold farxiga iso UTI

## 2024-01-02 NOTE — PROGRESS NOTE ADULT - PROVIDER SPECIALTY LIST ADULT
Endocrinology
Internal Medicine
Endocrinology
Internal Medicine

## 2024-01-02 NOTE — CHART NOTE - NSCHARTNOTEFT_GEN_A_CORE
Admitting Diagnosis:   Patient is a 73y old  Female who presents with a chief complaint of UTI  (29 Dec 2023 08:16)      PAST MEDICAL & SURGICAL HISTORY:  HTN (hypertension)  HLD (hyperlipidemia)  DM (diabetes mellitus), type 2  Rheumatoid arthritis  Stage 4 chronic kidney disease  Degenerative joint disease (DJD) of lumbar spine  Osteopenia  S/P total right hip arthroplasty    Current Nutrition Order: consistent carbohydrates, Glucerna 1x/day       PO Intake: Good (%) [   ]  Fair (50-75%) [  x ] Poor (<25%) [   ]    GI Issues: none reported    Skin Integrity: no pressure injuries/edema documented    Labs:   01-02    135  |  104  |  21  ----------------------------<  81  4.1   |  22  |  1.16    Ca    8.8      02 Jan 2024 05:30  Phos  3.6     01-02  Mg     1.8     01-02      CAPILLARY BLOOD GLUCOSE      POCT Blood Glucose.: 206 mg/dL (02 Jan 2024 12:49)  POCT Blood Glucose.: 103 mg/dL (02 Jan 2024 09:17)  POCT Blood Glucose.: 127 mg/dL (01 Jan 2024 21:43)  POCT Blood Glucose.: 135 mg/dL (01 Jan 2024 17:53)      Medications:  MEDICATIONS  (STANDING):  amLODIPine   Tablet 10 milliGRAM(s) Oral daily  atorvastatin 40 milliGRAM(s) Oral at bedtime  cyanocobalamin 1000 MICROGram(s) Oral daily  dextrose 5%. 1000 milliLiter(s) (50 mL/Hr) IV Continuous <Continuous>  dextrose 5%. 1000 milliLiter(s) (100 mL/Hr) IV Continuous <Continuous>  dextrose 5%. 1000 milliLiter(s) (50 mL/Hr) IV Continuous <Continuous>  dextrose 5%. 1000 milliLiter(s) (100 mL/Hr) IV Continuous <Continuous>  dextrose 50% Injectable 25 Gram(s) IV Push once  dextrose 50% Injectable 25 Gram(s) IV Push once  dextrose 50% Injectable 25 Gram(s) IV Push once  dextrose 50% Injectable 12.5 Gram(s) IV Push once  dextrose 50% Injectable 12.5 Gram(s) IV Push once  dextrose 50% Injectable 25 Gram(s) IV Push once  folic acid 1 milliGRAM(s) Oral daily  glucagon  Injectable 1 milliGRAM(s) IntraMuscular once  glucagon  Injectable 1 milliGRAM(s) IntraMuscular once  heparin   Injectable 5000 Unit(s) SubCutaneous every 8 hours  hydroxychloroquine 200 milliGRAM(s) Oral two times a day  influenza  Vaccine (HIGH DOSE) 0.7 milliLiter(s) IntraMuscular once  insulin glargine Injectable (LANTUS) 22 Unit(s) SubCutaneous at bedtime  insulin lispro (ADMELOG) corrective regimen sliding scale   SubCutaneous Before meals and at bedtime  insulin lispro Injectable (ADMELOG) 10 Unit(s) SubCutaneous three times a day before meals  predniSONE   Tablet 5 milliGRAM(s) Oral daily  sulfaSALAzine 1000 milliGRAM(s) Oral two times a day    MEDICATIONS  (PRN):  acetaminophen     Tablet .. 650 milliGRAM(s) Oral every 6 hours PRN Temp greater or equal to 38C (100.4F), Mild Pain (1 - 3)  dextrose Oral Gel 15 Gram(s) Oral once PRN Blood Glucose LESS THAN 70 milliGRAM(s)/deciliter  dextrose Oral Gel 15 Gram(s) Oral once PRN Blood Glucose LESS THAN 70 milliGRAM(s)/deciliter      Weight:  Height for BMI (FEET)	5 Feet  Height for BMI (INCHES)	3 Inch(s)  Height for BMI (CENTIMETERS)	160.02 Centimeter(s)  Weight for BMI (lbs)	137 lb  Weight for BMI (kg)	62.1 kg  Body Mass Index	24.2    Weight Change: no new weights to assess    Nutrition Focused Physical Exam: see nutrition risk note from 12/27    Estimated energy needs:   Estimated Energy Needs Weight (lbs)	137 lb  Estimated Energy Needs Weight (kg)	62.1 kg  Estimated Energy Needs From (shonda/kg)	30  Estimated Energy Needs To (shonda/kg)	35  Estimated Energy Needs Calculated From (shonda/kg)	1863  Estimated Energy Needs Calculated To (shonda/kg)	2173    Estimated Protein Needs Weight (lbs)	137 lb  Estimated Protein Needs Weight (kg)	62.1 kg  Estimated Protein Needs From (g/kg)	1.2  Estimated Protein Needs To (g/kg)	1.5  Estimated Protein Needs Calculated From (g/kg)	74.52  Estimated Protein Needs Calculated To (g/kg)	93.15    Estimated Fluid Needs Weight (lbs)	137 lb  Estimated Fluid Needs Weight (kg)	62.1 kg  Estimated Fluid Needs From (ml/kg)	30  Estimated Fluid Needs To (ml/kg)	35  Estimated Fluid Needs Calculated From (ml/kg)	1863  Estimated Fluid Needs Calculated To (ml/kg)	2173  Other Calculations	Based on Standards of Care pt within % IBW (119%) thus actual body weight used for all calculations. Needs adjusted for advanced age, cancer, malnutrition.    Subjective: 73F PMH insulin dpdt TIID, NSCLC adenocarcinoma w mets to brain (s/p RT x1 07/2023, s/p chemo x2 (last tx 11/16/23),R hip replacement, RA, CKD IIIA, HTN and HLD presenting for persist dysuria and polyuria. She reports this morning she felt dizzy and checked her blood sugar which was 514.  Pt reports fall yesterday and the day before when she experienced dizziness and lost consciousness "for a second". She reports suprapubic tenderness and foul  smelling urine. She was seen in [facility] ED 12/14 for hyperglycemia 500s and UTI discharged cefpodoxime x 14 days which she reported she finished. Last UCx 12/14 showing Ucx >100k klebsiella oxytoca/raoutella ornithinolytica R to CTX. She reports she has been taking prednisone 5mg for last two days because it was cloudy and she was concerned for RA flare.    Patient seen at bedside for follow up assessment. Current diet order: consistent carbohydrates, Glucerna 1x/day. Patient reports good appetite, consumed pancakes and wyatt at breakfast. Denies NVDC. Says she is drinking the Glucerna shake in between meals. Last fingerstick 206. Meds: insulin, vitamin B12, folic acid, prednisone. RD to f/u prn.    Previous Nutrition Diagnosis: Severe protein calorie malnutrition in context of chronic illness related to increased nutrient needs as evidenced by 12% weight loss x6 months, moderate muscle wasting.    Active [ x  ]  Resolved [   ]    Goal: Patient to meet at least 75% of nutritional needs consistently     Recommendations:  1. Continue with consistent carbohydrate diet and Glucerna 1x/day (220 kcal,10 g protein per serving).   2. Encourage pt to meet nutritional needs as able   3. Monitor labs: electrolytes, cmp  4. Monitor weights   5. Pain and bowel regimen per team   6. Will continue to assess/honor food preferences as able    Risk Level: High [   ] Moderate [  x ] Low [   ] Admitting Diagnosis:   Patient is a 73y old  Female who presents with a chief complaint of UTI  (29 Dec 2023 08:16)      PAST MEDICAL & SURGICAL HISTORY:  HTN (hypertension)  HLD (hyperlipidemia)  DM (diabetes mellitus), type 2  Rheumatoid arthritis  Stage 4 chronic kidney disease  Degenerative joint disease (DJD) of lumbar spine  Osteopenia  S/P total right hip arthroplasty    Current Nutrition Order: consistent carbohydrates, Glucerna 1x/day       PO Intake: Good (%) [   ]  Fair (50-75%) [  x ] Poor (<25%) [   ]    GI Issues: none reported    Skin Integrity: no pressure injuries/edema documented    Labs:   01-02    135  |  104  |  21  ----------------------------<  81  4.1   |  22  |  1.16    Ca    8.8      02 Jan 2024 05:30  Phos  3.6     01-02  Mg     1.8     01-02      CAPILLARY BLOOD GLUCOSE      POCT Blood Glucose.: 206 mg/dL (02 Jan 2024 12:49)  POCT Blood Glucose.: 103 mg/dL (02 Jan 2024 09:17)  POCT Blood Glucose.: 127 mg/dL (01 Jan 2024 21:43)  POCT Blood Glucose.: 135 mg/dL (01 Jan 2024 17:53)      Medications:  MEDICATIONS  (STANDING):  amLODIPine   Tablet 10 milliGRAM(s) Oral daily  atorvastatin 40 milliGRAM(s) Oral at bedtime  cyanocobalamin 1000 MICROGram(s) Oral daily  dextrose 5%. 1000 milliLiter(s) (50 mL/Hr) IV Continuous <Continuous>  dextrose 5%. 1000 milliLiter(s) (100 mL/Hr) IV Continuous <Continuous>  dextrose 5%. 1000 milliLiter(s) (50 mL/Hr) IV Continuous <Continuous>  dextrose 5%. 1000 milliLiter(s) (100 mL/Hr) IV Continuous <Continuous>  dextrose 50% Injectable 25 Gram(s) IV Push once  dextrose 50% Injectable 25 Gram(s) IV Push once  dextrose 50% Injectable 25 Gram(s) IV Push once  dextrose 50% Injectable 12.5 Gram(s) IV Push once  dextrose 50% Injectable 12.5 Gram(s) IV Push once  dextrose 50% Injectable 25 Gram(s) IV Push once  folic acid 1 milliGRAM(s) Oral daily  glucagon  Injectable 1 milliGRAM(s) IntraMuscular once  glucagon  Injectable 1 milliGRAM(s) IntraMuscular once  heparin   Injectable 5000 Unit(s) SubCutaneous every 8 hours  hydroxychloroquine 200 milliGRAM(s) Oral two times a day  influenza  Vaccine (HIGH DOSE) 0.7 milliLiter(s) IntraMuscular once  insulin glargine Injectable (LANTUS) 22 Unit(s) SubCutaneous at bedtime  insulin lispro (ADMELOG) corrective regimen sliding scale   SubCutaneous Before meals and at bedtime  insulin lispro Injectable (ADMELOG) 10 Unit(s) SubCutaneous three times a day before meals  predniSONE   Tablet 5 milliGRAM(s) Oral daily  sulfaSALAzine 1000 milliGRAM(s) Oral two times a day    MEDICATIONS  (PRN):  acetaminophen     Tablet .. 650 milliGRAM(s) Oral every 6 hours PRN Temp greater or equal to 38C (100.4F), Mild Pain (1 - 3)  dextrose Oral Gel 15 Gram(s) Oral once PRN Blood Glucose LESS THAN 70 milliGRAM(s)/deciliter  dextrose Oral Gel 15 Gram(s) Oral once PRN Blood Glucose LESS THAN 70 milliGRAM(s)/deciliter      Weight:  Height for BMI (FEET)	5 Feet  Height for BMI (INCHES)	3 Inch(s)  Height for BMI (CENTIMETERS)	160.02 Centimeter(s)  Weight for BMI (lbs)	137 lb  Weight for BMI (kg)	62.1 kg  Body Mass Index	24.2    Weight Change: no new weights to assess    Nutrition Focused Physical Exam: see nutrition risk note from 12/27    Estimated energy needs:   Estimated Energy Needs Weight (lbs)	137 lb  Estimated Energy Needs Weight (kg)	62.1 kg  Estimated Energy Needs From (shonda/kg)	30  Estimated Energy Needs To (shonda/kg)	35  Estimated Energy Needs Calculated From (shonda/kg)	1863  Estimated Energy Needs Calculated To (shonda/kg)	2173    Estimated Protein Needs Weight (lbs)	137 lb  Estimated Protein Needs Weight (kg)	62.1 kg  Estimated Protein Needs From (g/kg)	1.2  Estimated Protein Needs To (g/kg)	1.5  Estimated Protein Needs Calculated From (g/kg)	74.52  Estimated Protein Needs Calculated To (g/kg)	93.15    Estimated Fluid Needs Weight (lbs)	137 lb  Estimated Fluid Needs Weight (kg)	62.1 kg  Estimated Fluid Needs From (ml/kg)	30  Estimated Fluid Needs To (ml/kg)	35  Estimated Fluid Needs Calculated From (ml/kg)	1863  Estimated Fluid Needs Calculated To (ml/kg)	2173  Other Calculations	Based on Standards of Care pt within % IBW (119%) thus actual body weight used for all calculations. Needs adjusted for advanced age, cancer, malnutrition.    Subjective: 73F PMH insulin dpdt TIID, NSCLC adenocarcinoma w mets to brain (s/p RT x1 07/2023, s/p chemo x2 (last tx 11/16/23),R hip replacement, RA, CKD IIIA, HTN and HLD presenting for persist dysuria and polyuria. She reports this morning she felt dizzy and checked her blood sugar which was 514.  Pt reports fall yesterday and the day before when she experienced dizziness and lost consciousness "for a second". She reports suprapubic tenderness and foul  smelling urine. She was seen in [facility] ED 12/14 for hyperglycemia 500s and UTI discharged cefpodoxime x 14 days which she reported she finished. Last UCx 12/14 showing Ucx >100k klebsiella oxytoca/raoutella ornithinolytica R to CTX. She reports she has been taking prednisone 5mg for last two days because it was cloudy and she was concerned for RA flare.    Patient seen at bedside for follow up assessment. Current diet order: consistent carbohydrates, Glucerna 1x/day. Patient reports good appetite, consumed pancakes and wyatt at breakfast. Denies NVDC. Says she is drinking the Glucerna shake in between meals. Last fingerstick 206. Meds: insulin, vitamin B12, folic acid, prednisone. RD to f/u prn.    Previous Nutrition Diagnosis: Severe protein calorie malnutrition in context of chronic illness related to increased nutrient needs as evidenced by 12% weight loss x6 months, moderate muscle wasting.    Active [ x  ]  Resolved [   ]    Goal: Patient to meet at least 75% of nutritional needs consistently     Recommendations:  1. Continue with consistent carbohydrate diet and Glucerna 1x/day (220 kcal,10 g protein per serving).   2. Encourage pt to meet nutritional needs as able   3. Monitor labs: electrolytes, cmp  4. Monitor weights   5. Pain and bowel regimen per team   6. Will continue to assess/honor food preferences as able    Risk Level: High [   ] Moderate [  x ] Low [   ]

## 2024-01-02 NOTE — PROGRESS NOTE ADULT - PROBLEM SELECTOR PLAN 2
Pt reports dizziness that led to falls x 2 days with head strike. Denies AC use. Pt reports dizziness prior to fall  CTH shows no acute hemorrhage/infarct. EKG showing NSR with APC rate 56  s/p 1L LR  Fall possibly 2/2 vasovagal vs bradycardia vs APC  - negative orthostatic   - hold toprol 100mg qd  - consider outpatient holter monitor

## 2024-01-03 VITALS
OXYGEN SATURATION: 97 % | SYSTOLIC BLOOD PRESSURE: 128 MMHG | DIASTOLIC BLOOD PRESSURE: 76 MMHG | HEART RATE: 67 BPM | RESPIRATION RATE: 17 BRPM | TEMPERATURE: 98 F

## 2024-01-03 LAB
ANION GAP SERPL CALC-SCNC: 10 MMOL/L — SIGNIFICANT CHANGE UP (ref 5–17)
ANION GAP SERPL CALC-SCNC: 10 MMOL/L — SIGNIFICANT CHANGE UP (ref 5–17)
BUN SERPL-MCNC: 20 MG/DL — SIGNIFICANT CHANGE UP (ref 7–23)
BUN SERPL-MCNC: 20 MG/DL — SIGNIFICANT CHANGE UP (ref 7–23)
CALCIUM SERPL-MCNC: 9 MG/DL — SIGNIFICANT CHANGE UP (ref 8.4–10.5)
CALCIUM SERPL-MCNC: 9 MG/DL — SIGNIFICANT CHANGE UP (ref 8.4–10.5)
CHLORIDE SERPL-SCNC: 103 MMOL/L — SIGNIFICANT CHANGE UP (ref 96–108)
CHLORIDE SERPL-SCNC: 103 MMOL/L — SIGNIFICANT CHANGE UP (ref 96–108)
CO2 SERPL-SCNC: 24 MMOL/L — SIGNIFICANT CHANGE UP (ref 22–31)
CO2 SERPL-SCNC: 24 MMOL/L — SIGNIFICANT CHANGE UP (ref 22–31)
CREAT SERPL-MCNC: 1.16 MG/DL — SIGNIFICANT CHANGE UP (ref 0.5–1.3)
CREAT SERPL-MCNC: 1.16 MG/DL — SIGNIFICANT CHANGE UP (ref 0.5–1.3)
EGFR: 49 ML/MIN/1.73M2 — LOW
EGFR: 49 ML/MIN/1.73M2 — LOW
GLUCOSE BLDC GLUCOMTR-MCNC: 68 MG/DL — LOW (ref 70–99)
GLUCOSE BLDC GLUCOMTR-MCNC: 68 MG/DL — LOW (ref 70–99)
GLUCOSE BLDC GLUCOMTR-MCNC: 90 MG/DL — SIGNIFICANT CHANGE UP (ref 70–99)
GLUCOSE BLDC GLUCOMTR-MCNC: 90 MG/DL — SIGNIFICANT CHANGE UP (ref 70–99)
GLUCOSE SERPL-MCNC: 63 MG/DL — LOW (ref 70–99)
GLUCOSE SERPL-MCNC: 63 MG/DL — LOW (ref 70–99)
HCT VFR BLD CALC: 34.4 % — LOW (ref 34.5–45)
HCT VFR BLD CALC: 34.4 % — LOW (ref 34.5–45)
HGB BLD-MCNC: 10.5 G/DL — LOW (ref 11.5–15.5)
HGB BLD-MCNC: 10.5 G/DL — LOW (ref 11.5–15.5)
MAGNESIUM SERPL-MCNC: 1.7 MG/DL — SIGNIFICANT CHANGE UP (ref 1.6–2.6)
MAGNESIUM SERPL-MCNC: 1.7 MG/DL — SIGNIFICANT CHANGE UP (ref 1.6–2.6)
MCHC RBC-ENTMCNC: 28.5 PG — SIGNIFICANT CHANGE UP (ref 27–34)
MCHC RBC-ENTMCNC: 28.5 PG — SIGNIFICANT CHANGE UP (ref 27–34)
MCHC RBC-ENTMCNC: 30.5 GM/DL — LOW (ref 32–36)
MCHC RBC-ENTMCNC: 30.5 GM/DL — LOW (ref 32–36)
MCV RBC AUTO: 93.2 FL — SIGNIFICANT CHANGE UP (ref 80–100)
MCV RBC AUTO: 93.2 FL — SIGNIFICANT CHANGE UP (ref 80–100)
NRBC # BLD: 0 /100 WBCS — SIGNIFICANT CHANGE UP (ref 0–0)
NRBC # BLD: 0 /100 WBCS — SIGNIFICANT CHANGE UP (ref 0–0)
PHOSPHATE SERPL-MCNC: 3.4 MG/DL — SIGNIFICANT CHANGE UP (ref 2.5–4.5)
PHOSPHATE SERPL-MCNC: 3.4 MG/DL — SIGNIFICANT CHANGE UP (ref 2.5–4.5)
PLATELET # BLD AUTO: 172 K/UL — SIGNIFICANT CHANGE UP (ref 150–400)
PLATELET # BLD AUTO: 172 K/UL — SIGNIFICANT CHANGE UP (ref 150–400)
POTASSIUM SERPL-MCNC: 3.8 MMOL/L — SIGNIFICANT CHANGE UP (ref 3.5–5.3)
POTASSIUM SERPL-MCNC: 3.8 MMOL/L — SIGNIFICANT CHANGE UP (ref 3.5–5.3)
POTASSIUM SERPL-SCNC: 3.8 MMOL/L — SIGNIFICANT CHANGE UP (ref 3.5–5.3)
POTASSIUM SERPL-SCNC: 3.8 MMOL/L — SIGNIFICANT CHANGE UP (ref 3.5–5.3)
RBC # BLD: 3.69 M/UL — LOW (ref 3.8–5.2)
RBC # BLD: 3.69 M/UL — LOW (ref 3.8–5.2)
RBC # FLD: 14.7 % — HIGH (ref 10.3–14.5)
RBC # FLD: 14.7 % — HIGH (ref 10.3–14.5)
SODIUM SERPL-SCNC: 137 MMOL/L — SIGNIFICANT CHANGE UP (ref 135–145)
SODIUM SERPL-SCNC: 137 MMOL/L — SIGNIFICANT CHANGE UP (ref 135–145)
WBC # BLD: 6.24 K/UL — SIGNIFICANT CHANGE UP (ref 3.8–10.5)
WBC # BLD: 6.24 K/UL — SIGNIFICANT CHANGE UP (ref 3.8–10.5)
WBC # FLD AUTO: 6.24 K/UL — SIGNIFICANT CHANGE UP (ref 3.8–10.5)
WBC # FLD AUTO: 6.24 K/UL — SIGNIFICANT CHANGE UP (ref 3.8–10.5)

## 2024-01-03 PROCEDURE — 71045 X-RAY EXAM CHEST 1 VIEW: CPT

## 2024-01-03 PROCEDURE — 86850 RBC ANTIBODY SCREEN: CPT

## 2024-01-03 PROCEDURE — 97161 PT EVAL LOW COMPLEX 20 MIN: CPT

## 2024-01-03 PROCEDURE — 83036 HEMOGLOBIN GLYCOSYLATED A1C: CPT

## 2024-01-03 PROCEDURE — 87040 BLOOD CULTURE FOR BACTERIA: CPT

## 2024-01-03 PROCEDURE — 87186 SC STD MICRODIL/AGAR DIL: CPT

## 2024-01-03 PROCEDURE — 83935 ASSAY OF URINE OSMOLALITY: CPT

## 2024-01-03 PROCEDURE — 80061 LIPID PANEL: CPT

## 2024-01-03 PROCEDURE — 82330 ASSAY OF CALCIUM: CPT

## 2024-01-03 PROCEDURE — 86900 BLOOD TYPING SEROLOGIC ABO: CPT

## 2024-01-03 PROCEDURE — 70450 CT HEAD/BRAIN W/O DYE: CPT | Mod: MA

## 2024-01-03 PROCEDURE — 84132 ASSAY OF SERUM POTASSIUM: CPT

## 2024-01-03 PROCEDURE — 82803 BLOOD GASES ANY COMBINATION: CPT

## 2024-01-03 PROCEDURE — 87086 URINE CULTURE/COLONY COUNT: CPT

## 2024-01-03 PROCEDURE — 93005 ELECTROCARDIOGRAM TRACING: CPT

## 2024-01-03 PROCEDURE — 99285 EMERGENCY DEPT VISIT HI MDM: CPT

## 2024-01-03 PROCEDURE — 82977 ASSAY OF GGT: CPT

## 2024-01-03 PROCEDURE — 85027 COMPLETE CBC AUTOMATED: CPT

## 2024-01-03 PROCEDURE — 83735 ASSAY OF MAGNESIUM: CPT

## 2024-01-03 PROCEDURE — 87637 SARSCOV2&INF A&B&RSV AMP PRB: CPT

## 2024-01-03 PROCEDURE — 36415 COLL VENOUS BLD VENIPUNCTURE: CPT

## 2024-01-03 PROCEDURE — 82962 GLUCOSE BLOOD TEST: CPT

## 2024-01-03 PROCEDURE — 81001 URINALYSIS AUTO W/SCOPE: CPT

## 2024-01-03 PROCEDURE — 84100 ASSAY OF PHOSPHORUS: CPT

## 2024-01-03 PROCEDURE — 84300 ASSAY OF URINE SODIUM: CPT

## 2024-01-03 PROCEDURE — 80053 COMPREHEN METABOLIC PANEL: CPT

## 2024-01-03 PROCEDURE — 80048 BASIC METABOLIC PNL TOTAL CA: CPT

## 2024-01-03 PROCEDURE — 87184 SC STD DISK METHOD PER PLATE: CPT

## 2024-01-03 PROCEDURE — 82570 ASSAY OF URINE CREATININE: CPT

## 2024-01-03 PROCEDURE — 97116 GAIT TRAINING THERAPY: CPT

## 2024-01-03 PROCEDURE — 86901 BLOOD TYPING SEROLOGIC RH(D): CPT

## 2024-01-03 PROCEDURE — 84295 ASSAY OF SERUM SODIUM: CPT

## 2024-01-03 PROCEDURE — 82010 KETONE BODYS QUAN: CPT

## 2024-01-03 PROCEDURE — 85025 COMPLETE CBC W/AUTO DIFF WBC: CPT

## 2024-01-03 PROCEDURE — 82610 CYSTATIN C: CPT

## 2024-01-03 RX ADMIN — AMLODIPINE BESYLATE 10 MILLIGRAM(S): 2.5 TABLET ORAL at 06:24

## 2024-01-03 RX ADMIN — Medication 1000 MILLIGRAM(S): at 06:24

## 2024-01-03 RX ADMIN — HEPARIN SODIUM 5000 UNIT(S): 5000 INJECTION INTRAVENOUS; SUBCUTANEOUS at 06:24

## 2024-01-03 RX ADMIN — Medication 5 MILLIGRAM(S): at 06:24

## 2024-01-03 RX ADMIN — Medication 200 MILLIGRAM(S): at 06:24

## 2024-01-04 ENCOUNTER — APPOINTMENT (OUTPATIENT)
Dept: ENDOCRINOLOGY | Facility: CLINIC | Age: 74
End: 2024-01-04

## 2024-01-04 ENCOUNTER — APPOINTMENT (OUTPATIENT)
Dept: RHEUMATOLOGY | Facility: CLINIC | Age: 74
End: 2024-01-04

## 2024-01-04 NOTE — ASSESSMENT
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 RA diagnosed since 1993. Previously treated with MTX but stopped due to worsening CKD since January 2021,   now continue sulfasalazine 1 gm twice daily and hydroxychloroquine 200 mg twice daily. Overall, patient feels RA is well controlled and no flares on current regimen. Patient does not always remember p.m. dose of sulfasalazine, but does take at least 1 gm daily. Patient sees ophthalmologist every 6 months, up-to-date, has follow up visit in July 2023, no evidence of Plaquenil toxicity. History of osteoporosis,repeat DEXA 07/2021 with osteoporosis of the femoral neck, with history of right hip fracture, status post total hip replacement in January 2022. Started on Prolia in June 2022, last dose due December 7, 2022. No jaw pain noted or pending dental work. Continue calcium and vitamin D supplementation. Continue weightbearing exercises as tolerated, fall precautions discussed. History of chronic L spine DJD for which follows with pain management and takes Morphine daily. Recent labs reviewed from 11/2022, will update labs today given elevated liver function tests. Will follow-up in 4 months or sooner as needed.

## 2024-01-04 NOTE — ADDENDUM
[FreeTextEntry1] : I, Jaswant Monaco, documented this note as a scribe on behalf of Dr. Anjana Johnston MD on 01/04/2024.

## 2024-01-04 NOTE — PHYSICAL EXAM
[General Appearance - Alert] : alert [General Appearance - In No Acute Distress] : in no acute distress [General Appearance - Well Nourished] : well nourished [General Appearance - Well Developed] : well developed [General Appearance - Well-Appearing] : healthy appearing [Sclera] : the sclera and conjunctiva were normal [Respiration, Rhythm And Depth] : normal respiratory rhythm and effort [Exaggerated Use Of Accessory Muscles For Inspiration] : no accessory muscle use [Edema] : there was no peripheral edema [Abnormal Walk] : normal gait [] : no rash [Oriented To Time, Place, And Person] : oriented to person, place, and time [Impaired Insight] : insight and judgment were intact [Affect] : the affect was normal [Mood] : the mood was normal [FreeTextEntry1] : Enlargement of the left ulnar styloid, no tenderness.  Improved handgrip on the left.  No pain with range of motion of the left wrist.  Decreased full extension of the left elbow.  Decreased range of motion of bilateral shoulders at extremes of rotation.

## 2024-01-04 NOTE — HISTORY OF PRESENT ILLNESS
[FreeTextEntry1] : January 4, 2024 Patient returns for follow up of RA  January 17, 2023 Patient returns for follow up, RA with  + RF and +CCP Feeling well Continue SSZ 1 gm bid Plaquenil 200 mg bid Folic acid 1 mg qday Would like refills for medications No new medications reported Reviewed previous labs from November 14, 2020 with elevated liver enzymes, will repeat today Had prolia in 12/7/2022, no side effects noted Walks about every other day to the store No cane or walker at this time, feels steady on feet, will start walking exercise routine Trying to modify diet for anti-inflammatory Last ophthalmology 12/2022, has follow up in July 2023 October 17, 2022 Diagnosed at the age of 20 Patient with +RF and +CCP SSZ 1 gm bid Plaquenil 200 mg bid Previously took methotrexate until creatinine increased Took MTX for a long time, about 10 years Feels about the same without methotrexate, but maybe a little better Eye doctor every six months, no problems with Plaquenil Xrays in August 2022, reviewed with patient. Tries not to take medications for the pain given kidney function Aleve spray as needed Hip fracture right 1/2022, total hip replacement Started prolia June 2022, follow up in December 7, 2022 had fall again on right hip, no fracture No PT for present, stopped using walker as does not need Uses access a ride for transportation Left hand dominant

## 2024-01-04 NOTE — END OF VISIT
[FreeTextEntry3] : All medical record entries made by the Scribe were at my, Dr. Anjana Johnston MD, direction and personally dictated by me on 01/04/2024. I have reviewed the chart and agree that the record accurately reflects my personal performance of the history, physical exam, assessment and plan. I have also personally directed, reviewed, and agreed with the chart.

## 2024-01-09 ENCOUNTER — APPOINTMENT (OUTPATIENT)
Dept: INFUSION THERAPY | Facility: CLINIC | Age: 74
End: 2024-01-09

## 2024-01-09 ENCOUNTER — NON-APPOINTMENT (OUTPATIENT)
Age: 74
End: 2024-01-09

## 2024-01-09 ENCOUNTER — OUTPATIENT (OUTPATIENT)
Dept: OUTPATIENT SERVICES | Facility: HOSPITAL | Age: 74
LOS: 1 days | End: 2024-01-09
Payer: MEDICARE

## 2024-01-09 VITALS
TEMPERATURE: 97 F | HEIGHT: 63 IN | HEART RATE: 85 BPM | DIASTOLIC BLOOD PRESSURE: 85 MMHG | WEIGHT: 141.1 LBS | OXYGEN SATURATION: 98 % | RESPIRATION RATE: 18 BRPM | SYSTOLIC BLOOD PRESSURE: 155 MMHG

## 2024-01-09 DIAGNOSIS — C34.90 MALIGNANT NEOPLASM OF UNSPECIFIED PART OF UNSPECIFIED BRONCHUS OR LUNG: ICD-10-CM

## 2024-01-09 DIAGNOSIS — Z96.641 PRESENCE OF RIGHT ARTIFICIAL HIP JOINT: Chronic | ICD-10-CM

## 2024-01-09 LAB
ALBUMIN SERPL ELPH-MCNC: 3.3 G/DL — SIGNIFICANT CHANGE UP (ref 3.3–5)
ALBUMIN SERPL ELPH-MCNC: 3.3 G/DL — SIGNIFICANT CHANGE UP (ref 3.3–5)
ALP SERPL-CCNC: 173 U/L — HIGH (ref 40–120)
ALP SERPL-CCNC: 173 U/L — HIGH (ref 40–120)
ALT FLD-CCNC: 79 U/L — HIGH (ref 10–45)
ALT FLD-CCNC: 79 U/L — HIGH (ref 10–45)
ANION GAP SERPL CALC-SCNC: 4 MMOL/L — LOW (ref 5–17)
ANION GAP SERPL CALC-SCNC: 4 MMOL/L — LOW (ref 5–17)
AST SERPL-CCNC: 60 U/L — HIGH (ref 10–40)
AST SERPL-CCNC: 60 U/L — HIGH (ref 10–40)
BILIRUB SERPL-MCNC: 0.6 MG/DL — SIGNIFICANT CHANGE UP (ref 0.2–1.2)
BILIRUB SERPL-MCNC: 0.6 MG/DL — SIGNIFICANT CHANGE UP (ref 0.2–1.2)
BUN SERPL-MCNC: 16 MG/DL — SIGNIFICANT CHANGE UP (ref 7–23)
BUN SERPL-MCNC: 16 MG/DL — SIGNIFICANT CHANGE UP (ref 7–23)
CALCIUM SERPL-MCNC: 10.8 MG/DL — HIGH (ref 8.4–10.5)
CALCIUM SERPL-MCNC: 10.8 MG/DL — HIGH (ref 8.4–10.5)
CHLORIDE SERPL-SCNC: 104 MMOL/L — SIGNIFICANT CHANGE UP (ref 96–108)
CHLORIDE SERPL-SCNC: 104 MMOL/L — SIGNIFICANT CHANGE UP (ref 96–108)
CO2 SERPL-SCNC: 29 MMOL/L — SIGNIFICANT CHANGE UP (ref 22–31)
CO2 SERPL-SCNC: 29 MMOL/L — SIGNIFICANT CHANGE UP (ref 22–31)
CREAT SERPL-MCNC: 1 MG/DL — SIGNIFICANT CHANGE UP (ref 0.5–1.3)
CREAT SERPL-MCNC: 1 MG/DL — SIGNIFICANT CHANGE UP (ref 0.5–1.3)
EGFR: 59 ML/MIN/1.73M2 — LOW
EGFR: 59 ML/MIN/1.73M2 — LOW
GLUCOSE SERPL-MCNC: 273 MG/DL — HIGH (ref 70–99)
GLUCOSE SERPL-MCNC: 273 MG/DL — HIGH (ref 70–99)
HCT VFR BLD CALC: 37 % — SIGNIFICANT CHANGE UP (ref 34.5–45)
HCT VFR BLD CALC: 37 % — SIGNIFICANT CHANGE UP (ref 34.5–45)
HGB BLD-MCNC: 11.6 G/DL — SIGNIFICANT CHANGE UP (ref 11.5–15.5)
HGB BLD-MCNC: 11.6 G/DL — SIGNIFICANT CHANGE UP (ref 11.5–15.5)
LYMPHOCYTES # BLD AUTO: 1.1 K/UL — SIGNIFICANT CHANGE UP (ref 1–3.3)
LYMPHOCYTES # BLD AUTO: 1.1 K/UL — SIGNIFICANT CHANGE UP (ref 1–3.3)
LYMPHOCYTES # BLD AUTO: 15.2 % — SIGNIFICANT CHANGE UP (ref 13–44)
LYMPHOCYTES # BLD AUTO: 15.2 % — SIGNIFICANT CHANGE UP (ref 13–44)
MCHC RBC-ENTMCNC: 29.1 PG — SIGNIFICANT CHANGE UP (ref 27–34)
MCHC RBC-ENTMCNC: 29.1 PG — SIGNIFICANT CHANGE UP (ref 27–34)
MCHC RBC-ENTMCNC: 31.4 GM/DL — LOW (ref 32–36)
MCHC RBC-ENTMCNC: 31.4 GM/DL — LOW (ref 32–36)
MCV RBC AUTO: 93 FL — SIGNIFICANT CHANGE UP (ref 80–100)
MCV RBC AUTO: 93 FL — SIGNIFICANT CHANGE UP (ref 80–100)
NEUTROPHILS # BLD AUTO: 5.7 K/UL — SIGNIFICANT CHANGE UP (ref 1.8–7.4)
NEUTROPHILS # BLD AUTO: 5.7 K/UL — SIGNIFICANT CHANGE UP (ref 1.8–7.4)
NEUTROPHILS NFR BLD AUTO: 82.5 % — HIGH (ref 43–77)
NEUTROPHILS NFR BLD AUTO: 82.5 % — HIGH (ref 43–77)
PLATELET # BLD AUTO: 210 K/UL — SIGNIFICANT CHANGE UP (ref 150–400)
PLATELET # BLD AUTO: 210 K/UL — SIGNIFICANT CHANGE UP (ref 150–400)
POTASSIUM SERPL-MCNC: 4.8 MMOL/L — SIGNIFICANT CHANGE UP (ref 3.5–5.3)
POTASSIUM SERPL-MCNC: 4.8 MMOL/L — SIGNIFICANT CHANGE UP (ref 3.5–5.3)
POTASSIUM SERPL-SCNC: 4.8 MMOL/L — SIGNIFICANT CHANGE UP (ref 3.5–5.3)
POTASSIUM SERPL-SCNC: 4.8 MMOL/L — SIGNIFICANT CHANGE UP (ref 3.5–5.3)
PROT SERPL-MCNC: 7.6 G/DL — SIGNIFICANT CHANGE UP (ref 6–8.3)
PROT SERPL-MCNC: 7.6 G/DL — SIGNIFICANT CHANGE UP (ref 6–8.3)
RBC # BLD: 3.98 M/UL — SIGNIFICANT CHANGE UP (ref 3.8–5.2)
RBC # BLD: 3.98 M/UL — SIGNIFICANT CHANGE UP (ref 3.8–5.2)
RBC # FLD: 14.9 % — HIGH (ref 10.3–14.5)
RBC # FLD: 14.9 % — HIGH (ref 10.3–14.5)
SODIUM SERPL-SCNC: 137 MMOL/L — SIGNIFICANT CHANGE UP (ref 135–145)
SODIUM SERPL-SCNC: 137 MMOL/L — SIGNIFICANT CHANGE UP (ref 135–145)
TSH SERPL-MCNC: 0.36 UIU/ML — SIGNIFICANT CHANGE UP (ref 0.27–4.2)
TSH SERPL-MCNC: 0.36 UIU/ML — SIGNIFICANT CHANGE UP (ref 0.27–4.2)
WBC # BLD: 7 K/UL — SIGNIFICANT CHANGE UP (ref 3.8–10.5)
WBC # BLD: 7 K/UL — SIGNIFICANT CHANGE UP (ref 3.8–10.5)
WBC # FLD AUTO: 7 K/UL — SIGNIFICANT CHANGE UP (ref 3.8–10.5)
WBC # FLD AUTO: 7 K/UL — SIGNIFICANT CHANGE UP (ref 3.8–10.5)

## 2024-01-09 PROCEDURE — 85025 COMPLETE CBC W/AUTO DIFF WBC: CPT

## 2024-01-09 PROCEDURE — 96413 CHEMO IV INFUSION 1 HR: CPT

## 2024-01-09 PROCEDURE — 96367 TX/PROPH/DG ADDL SEQ IV INF: CPT

## 2024-01-09 PROCEDURE — 96415 CHEMO IV INFUSION ADDL HR: CPT

## 2024-01-09 PROCEDURE — 96417 CHEMO IV INFUS EACH ADDL SEQ: CPT

## 2024-01-09 PROCEDURE — 84443 ASSAY THYROID STIM HORMONE: CPT

## 2024-01-09 PROCEDURE — 36415 COLL VENOUS BLD VENIPUNCTURE: CPT

## 2024-01-09 PROCEDURE — 80053 COMPREHEN METABOLIC PANEL: CPT

## 2024-01-09 PROCEDURE — 96375 TX/PRO/DX INJ NEW DRUG ADDON: CPT

## 2024-01-09 RX ORDER — DEXAMETHASONE 0.5 MG/5ML
10 ELIXIR ORAL ONCE
Refills: 0 | Status: COMPLETED | OUTPATIENT
Start: 2024-01-09 | End: 2024-01-09

## 2024-01-09 RX ORDER — FOSAPREPITANT DIMEGLUMINE 150 MG/5ML
150 INJECTION, POWDER, LYOPHILIZED, FOR SOLUTION INTRAVENOUS ONCE
Refills: 0 | Status: COMPLETED | OUTPATIENT
Start: 2024-01-09 | End: 2024-01-09

## 2024-01-09 RX ORDER — PACLITAXEL 6 MG/ML
230 INJECTION, SOLUTION, CONCENTRATE INTRAVENOUS ONCE
Refills: 0 | Status: COMPLETED | OUTPATIENT
Start: 2024-01-09 | End: 2024-01-09

## 2024-01-09 RX ORDER — PEMBROLIZUMAB 25 MG/ML
200 INJECTION, SOLUTION INTRAVENOUS ONCE
Refills: 0 | Status: COMPLETED | OUTPATIENT
Start: 2024-01-09 | End: 2024-01-09

## 2024-01-09 RX ORDER — FAMOTIDINE 10 MG/ML
20 INJECTION INTRAVENOUS ONCE
Refills: 0 | Status: COMPLETED | OUTPATIENT
Start: 2024-01-09 | End: 2024-01-09

## 2024-01-09 RX ORDER — PALONOSETRON HYDROCHLORIDE 0.25 MG/5ML
0.25 INJECTION, SOLUTION INTRAVENOUS ONCE
Refills: 0 | Status: COMPLETED | OUTPATIENT
Start: 2024-01-09 | End: 2024-01-09

## 2024-01-09 RX ADMIN — FOSAPREPITANT DIMEGLUMINE 150 MILLIGRAM(S): 150 INJECTION, POWDER, LYOPHILIZED, FOR SOLUTION INTRAVENOUS at 14:25

## 2024-01-09 RX ADMIN — PEMBROLIZUMAB 200 MILLIGRAM(S): 25 INJECTION, SOLUTION INTRAVENOUS at 13:20

## 2024-01-09 RX ADMIN — PEMBROLIZUMAB 200 MILLIGRAM(S): 25 INJECTION, SOLUTION INTRAVENOUS at 12:47

## 2024-01-09 RX ADMIN — FOSAPREPITANT DIMEGLUMINE 500 MILLIGRAM(S): 150 INJECTION, POWDER, LYOPHILIZED, FOR SOLUTION INTRAVENOUS at 13:52

## 2024-01-09 RX ADMIN — PACLITAXEL 230 MILLIGRAM(S): 6 INJECTION, SOLUTION, CONCENTRATE INTRAVENOUS at 17:50

## 2024-01-09 RX ADMIN — Medication 202 MILLIGRAM(S): at 14:26

## 2024-01-09 RX ADMIN — Medication 300 MILLIGRAM(S): at 17:37

## 2024-01-09 RX ADMIN — Medication 204 MILLIGRAM(S): at 13:34

## 2024-01-09 RX ADMIN — PALONOSETRON HYDROCHLORIDE 0.25 MILLIGRAM(S): 0.25 INJECTION, SOLUTION INTRAVENOUS at 13:49

## 2024-01-09 RX ADMIN — FAMOTIDINE 20 MILLIGRAM(S): 10 INJECTION INTRAVENOUS at 13:48

## 2024-01-09 RX ADMIN — PACLITAXEL 230 MILLIGRAM(S): 6 INJECTION, SOLUTION, CONCENTRATE INTRAVENOUS at 14:42

## 2024-01-09 RX ADMIN — Medication 25 MILLIGRAM(S): at 14:41

## 2024-01-09 RX ADMIN — Medication 10 MILLIGRAM(S): at 13:49

## 2024-01-09 RX ADMIN — Medication 300 MILLIGRAM(S): at 18:25

## 2024-01-10 ENCOUNTER — APPOINTMENT (OUTPATIENT)
Dept: HOME HEALTH SERVICES | Facility: HOME HEALTH | Age: 74
End: 2024-01-10
Payer: MEDICARE

## 2024-01-10 VITALS
OXYGEN SATURATION: 99 % | TEMPERATURE: 97 F | HEART RATE: 70 BPM | SYSTOLIC BLOOD PRESSURE: 138 MMHG | RESPIRATION RATE: 20 BRPM | HEIGHT: 63 IN | DIASTOLIC BLOOD PRESSURE: 72 MMHG

## 2024-01-10 DIAGNOSIS — E83.42 HYPOMAGNESEMIA: ICD-10-CM

## 2024-01-10 DIAGNOSIS — Z87.42 PERSONAL HISTORY OF OTHER DISEASES OF THE FEMALE GENITAL TRACT: ICD-10-CM

## 2024-01-10 DIAGNOSIS — I13.10 HYPERTENSIVE HEART AND CHRONIC KIDNEY DISEASE WITHOUT HEART FAILURE, WITH STAGE 1 THROUGH STAGE 4 CHRONIC KIDNEY DISEASE, OR UNSPECIFIED CHRONIC KIDNEY DISEASE: ICD-10-CM

## 2024-01-10 DIAGNOSIS — Z65.8 OTHER SPECIFIED PROBLEMS RELATED TO PSYCHOSOCIAL CIRCUMSTANCES: ICD-10-CM

## 2024-01-10 DIAGNOSIS — Z47.1 AFTERCARE FOLLOWING JOINT REPLACEMENT SURGERY: ICD-10-CM

## 2024-01-10 DIAGNOSIS — B37.9 CANDIDIASIS, UNSPECIFIED: ICD-10-CM

## 2024-01-10 DIAGNOSIS — E11.22 TYPE 2 DIABETES MELLITUS WITH DIABETIC CHRONIC KIDNEY DISEASE: ICD-10-CM

## 2024-01-10 DIAGNOSIS — E78.5 HYPERLIPIDEMIA, UNSPECIFIED: ICD-10-CM

## 2024-01-10 DIAGNOSIS — Z86.73 PERSONAL HISTORY OF TRANSIENT ISCHEMIC ATTACK (TIA), AND CEREBRAL INFARCTION WITHOUT RESIDUAL DEFICITS: ICD-10-CM

## 2024-01-10 DIAGNOSIS — R73.9 HYPERGLYCEMIA, UNSPECIFIED: ICD-10-CM

## 2024-01-10 DIAGNOSIS — D50.9 IRON DEFICIENCY ANEMIA, UNSPECIFIED: ICD-10-CM

## 2024-01-10 DIAGNOSIS — Z88.3 ALLERGY STATUS TO OTHER ANTI-INFECTIVE AGENTS: ICD-10-CM

## 2024-01-10 DIAGNOSIS — E11.65 TYPE 2 DIABETES MELLITUS WITH HYPERGLYCEMIA: ICD-10-CM

## 2024-01-10 DIAGNOSIS — Z87.09 PERSONAL HISTORY OF OTHER DISEASES OF THE RESPIRATORY SYSTEM: ICD-10-CM

## 2024-01-10 DIAGNOSIS — Z79.82 LONG TERM (CURRENT) USE OF ASPIRIN: ICD-10-CM

## 2024-01-10 DIAGNOSIS — C79.51 SECONDARY MALIGNANT NEOPLASM OF BONE: ICD-10-CM

## 2024-01-10 DIAGNOSIS — E04.1 NONTOXIC SINGLE THYROID NODULE: ICD-10-CM

## 2024-01-10 DIAGNOSIS — Z87.440 PERSONAL HISTORY OF URINARY (TRACT) INFECTIONS: ICD-10-CM

## 2024-01-10 DIAGNOSIS — M06.9 RHEUMATOID ARTHRITIS, UNSPECIFIED: ICD-10-CM

## 2024-01-10 DIAGNOSIS — Z96.641 AFTERCARE FOLLOWING JOINT REPLACEMENT SURGERY: ICD-10-CM

## 2024-01-10 DIAGNOSIS — C79.31 SECONDARY MALIGNANT NEOPLASM OF BRAIN: ICD-10-CM

## 2024-01-10 DIAGNOSIS — R00.1 BRADYCARDIA, UNSPECIFIED: ICD-10-CM

## 2024-01-10 DIAGNOSIS — N17.9 ACUTE KIDNEY FAILURE, UNSPECIFIED: ICD-10-CM

## 2024-01-10 DIAGNOSIS — N18.30 CHRONIC KIDNEY DISEASE, STAGE 3 UNSPECIFIED: ICD-10-CM

## 2024-01-10 DIAGNOSIS — B96.89 OTHER SPECIFIED BACTERIAL AGENTS AS THE CAUSE OF DISEASES CLASSIFIED ELSEWHERE: ICD-10-CM

## 2024-01-10 DIAGNOSIS — E43 UNSPECIFIED SEVERE PROTEIN-CALORIE MALNUTRITION: ICD-10-CM

## 2024-01-10 DIAGNOSIS — Z96.641 PRESENCE OF RIGHT ARTIFICIAL HIP JOINT: ICD-10-CM

## 2024-01-10 DIAGNOSIS — Z79.4 LONG TERM (CURRENT) USE OF INSULIN: ICD-10-CM

## 2024-01-10 DIAGNOSIS — M85.80 OTHER SPECIFIED DISORDERS OF BONE DENSITY AND STRUCTURE, UNSPECIFIED SITE: ICD-10-CM

## 2024-01-10 DIAGNOSIS — Z87.81 PERSONAL HISTORY OF (HEALED) TRAUMATIC FRACTURE: ICD-10-CM

## 2024-01-10 DIAGNOSIS — Z16.39 RESISTANCE TO OTHER SPECIFIED ANTIMICROBIAL DRUG: ICD-10-CM

## 2024-01-10 DIAGNOSIS — N30.01 ACUTE CYSTITIS WITH HEMATURIA: ICD-10-CM

## 2024-01-10 DIAGNOSIS — C34.12 MALIGNANT NEOPLASM OF UPPER LOBE, LEFT BRONCHUS OR LUNG: ICD-10-CM

## 2024-01-10 DIAGNOSIS — M47.816 SPONDYLOSIS WITHOUT MYELOPATHY OR RADICULOPATHY, LUMBAR REGION: ICD-10-CM

## 2024-01-10 DIAGNOSIS — J10.1 INFLUENZA DUE TO OTHER IDENTIFIED INFLUENZA VIRUS WITH OTHER RESPIRATORY MANIFESTATIONS: ICD-10-CM

## 2024-01-10 DIAGNOSIS — R42 DIZZINESS AND GIDDINESS: ICD-10-CM

## 2024-01-10 DIAGNOSIS — N39.0 URINARY TRACT INFECTION, SITE NOT SPECIFIED: ICD-10-CM

## 2024-01-10 DIAGNOSIS — B96.1 KLEBSIELLA PNEUMONIAE [K. PNEUMONIAE] AS THE CAUSE OF DISEASES CLASSIFIED ELSEWHERE: ICD-10-CM

## 2024-01-10 PROCEDURE — 99495 TRANSJ CARE MGMT MOD F2F 14D: CPT

## 2024-01-10 PROCEDURE — 99350 HOME/RES VST EST HIGH MDM 60: CPT

## 2024-01-10 RX ORDER — DAPAGLIFLOZIN 10 MG/1
10 TABLET, FILM COATED ORAL AT BEDTIME
Qty: 1 | Refills: 3 | Status: DISCONTINUED | COMMUNITY
Start: 2020-09-10 | End: 2024-01-10

## 2024-01-10 RX ORDER — LOSARTAN POTASSIUM 100 MG/1
100 TABLET, FILM COATED ORAL
Qty: 90 | Refills: 3 | Status: DISCONTINUED | COMMUNITY
Start: 2019-05-07 | End: 2024-01-10

## 2024-01-10 RX ORDER — MICONAZOLE NITRATE 2 G/100G
2 CREAM TOPICAL TWICE DAILY
Qty: 1 | Refills: 0 | Status: DISCONTINUED | COMMUNITY
Start: 2023-12-19 | End: 2024-01-10

## 2024-01-10 RX ORDER — FLUCONAZOLE 100 MG/1
100 TABLET ORAL ONCE
Qty: 1 | Refills: 0 | Status: DISCONTINUED | COMMUNITY
Start: 2023-12-19 | End: 2024-01-10

## 2024-01-10 RX ORDER — CALCIUM CARBONATE 500 MG/1
500 TABLET, CHEWABLE ORAL
Qty: 60 | Refills: 0 | Status: ACTIVE | COMMUNITY
Start: 2023-10-30

## 2024-01-10 RX ORDER — ATORVASTATIN CALCIUM 40 MG/1
40 TABLET, FILM COATED ORAL
Qty: 1 | Refills: 3 | Status: ACTIVE | COMMUNITY
Start: 2024-01-10

## 2024-01-10 NOTE — HEALTH RISK ASSESSMENT
[HRA Reviewed] : Health risk assessment reviewed [Independent] : managing finances [Some assistance needed] : doing laundry [Two or more falls in past year] : Patient reported two or more falls in the past year [Yes] : The patient does have visual impairment [[facility] [place]] : 4 [FreeTextEntry2] : use rollator assisted in walking [FreeTextEntry8] : using rollator in the home and outside [[employer]] : 4 [de-identified] : use rollator in and outside as assistive device.

## 2024-01-10 NOTE — HISTORY OF PRESENT ILLNESS
[House Calls Co-Management Patient] : [unfilled] is a House Calls co-management patient [PT] : PT [Patient is stable] : patient is stable [Education provided] : education provided [Patient] : patient [LastPCPVisitDate] : 11/23 [FreeTextEntry4] : hematology, ONCOLOGY, CARDIOLOGY, endocrinology, RHEUMATOLOGY [FreeTextEntry1] : Not homebound [FreeTextEntry2] : 01/10/2024 COVID SCREEN: Patient or caretaker denies fever, cough, trouble breathing, rash, vomiting. Patient has not been in close contact with anyone who is COVID-19 positive, or suspected of having COVID-19.  N95 mask, gloves, eye wear and gown (if indicated) used during visit: Yes.  Total face to face time with patient is 45 min.  MICHAEL READ is an 73 year with DM2 on insulin, Right hip joint replacement, herniated disc, HLD, HTN, Lung CA metastatic to brain on chemo therapy,  Osteoarthritis, obesity, CKD stage 3, Osteopenia, osteoporosis, Rheumatoid arthritis, venous insufficiency, osteoporosis, UTI, fall with injury, influenza A seen today post hospital discharge home visit  Patient alone during the visit.  Patient's last hospitalization was  at [facility] on 12/26/23 for hyperglycemia, UTI and also patient reports of s/p fall unwitnessed at home. During the stay at hospital, patient was also diagnosis with influenzas A. Patient was discharge home on 01/02/24.    Since last hospitalization patient reports back to baseline. Patient reports oral thrush due to chemotherapy. Patient denies chest pain, palpitation, discomfort, distress, dizziness, headache and n/v.   Medication reconciled with changes. From hospital discharge documents reviewed medication changes were  d/c losartan 10mmg oral daily and farxiga 10mg oral daily. Newly started on Pioglitazone 15mg oral tab daily.    Patient/ patient's caregiver reports no weight loss >10 lbs in the past 6 months. No changes in dentition or swallowing reported, No changes in hearing or vision reported. No changes in Cognition reported. Patient denies any symptoms of depression or anxiety. Patient is ADL independent/dependent and IADL independent/dependent. No changes in gait or falls reported.  Patient's home environment is safe.   Goals of care discussed 10min.  Full Code with no limitations

## 2024-01-10 NOTE — PHYSICAL EXAM
[No Acute Distress] : no acute distress [Normal Voice/Communication] : normal voice communication [Normal Sclera/Conjunctiva] : normal sclera/conjunctiva [EOMI] : extra ocular movement intact [Normal Outer Ear/Nose] : the ears and nose were normal in appearance [Normal TMs] : both tympanic membranes were normal [No JVD] : no jugular venous distention [No LAD] : no lymphadenopathy [No Respiratory Distress] : no respiratory distress [Clear to Auscultation] : lungs were clear to auscultation bilaterally [No Accessory Muscle Use] : no accessory muscle use [Normal Rate] : heart rate was normal  [Regular Rhythm] : with a regular rhythm [Normal S1, S2] : normal S1 and S2 [No Murmurs] : no murmurs heard [No Edema] : there was no peripheral edema [Breast Exam Declined] : patient declined to have breast exam done [Normal Bowel Sounds] : normal bowel sounds [Non Tender] : non-tender [Soft] : abdomen soft [Not Distended] : not distended [Patient Refused] : rectal exam was refused by the patient [Normal Post Cervical Nodes] : no posterior cervical lymphadenopathy [Normal Anterior Cervical Nodes] : no anterior cervical lymphadenopathy [No CVA Tenderness] : no ~M costovertebral angle tenderness [No Joint Swelling] : no joint swelling seen [No Rash] : no rash [No Skin Lesions] : no skin lesions [Cranial Nerves Intact] : cranial nerves 2-12 were intact [Normal Reflexes] : deep tendon reflexes were 2+ and symmetric [Oriented x3] : oriented to person, place, and time [de-identified] : virginal bleeding, perineal itchiness. [Over the Past 2 Weeks, Have You Felt Down, Depressed, or Hopeless?] : 1.) Over the past 2 weeks, have you felt down, depressed, or hopeless? No [Over the Past 2 Weeks, Have You Felt Little Interest or Pleasure Doing Things?] : 2.) Over the past 2 weeks, have you felt little interest or pleasure doing things? No [Foot Ulcers] : no foot ulcers [de-identified] : unsteady gait [de-identified] : left upper arm and right lateral hip and left lateral side of eye bruises.

## 2024-01-10 NOTE — CHRONIC CARE ASSESSMENT
[Inadequate social support] : inadequate social support [Less than 3 Times Per Week] : exercises less than 3 times per week [> 30 Minutes/Session] : (> 30 minutes per session) [Walking] : walking [Diabetic Diet] : diabetic [Low Fat Diet] : low fat [Low Carb Diet] : low carbohydrate [Low Salt Diet] : low salt [General Adherence] : and is generally adherent [PPS Score: ____] : Palliative Performance Scale (PPS) Score: [unfilled] [de-identified] : low sodium, low fat, low carb, diabetic diet [Over the Past 2 Weeks, Have You Felt Down, Depressed, or Hopeless?] : 1.) Over the past 2 weeks, have you felt down, depressed, or hopeless? No [Over the Past 2 Weeks, Have You Felt Little Interest or Pleasure Doing Things?] : 2.) Over the past 2 weeks, have you felt little interest or pleasure doing things? No [Reports changes in hearing] : reports no changes in hearing [Reports changes in vision] : Reports no changes in vision [de-identified] : Denies pain [de-identified] : multiple falls with injury [de-identified] : Lung CA metastatic to brain and on chemo therapy, uncontrolled diabetes, falls  [de-identified] : walks as tolerated [de-identified] : low sodium, low fat, low carb, diabetic diet

## 2024-01-10 NOTE — REASON FOR VISIT
[Post ER] : a Post ER visit [Other: _____] : [unfilled] [Pre-Visit Preparation] : pre-visit preparation was done [Intercurrent Specialty/Sub-specialty Visits] : the patient has intercurrent specialty/sub-specialty visits [FreeTextEntry1] : DM2 on insulin, Right hip joint replacement, herniated disc, HLD, HTN, Lung CA metastatic to brain on chemo therapy,   Osteoarthritis, obesity, CKD stage 3, Osteopenia, osteoporosis, Rheumatoid arthritis, venous insufficiency, osteoporosis, UTI [FreeTextEntry2] : reviewed chart [FreeTextEntry3] : cardiology, endocrinology, oncology

## 2024-01-10 NOTE — REVIEW OF SYSTEMS
[Dysuria] : no dysuria [Incontinence] : no incontinence [Nocturia] : no nocturia [Poor Libido] : libido not poor [Hematuria] : hematuria [Frequency] : no frequency [Vaginal Discharge] : vaginal discharge [Dysmenorrhea] : no dysmenorrhea [Joint Pain] : joint pain [Unsteady Walking] : ataxia [Easy Bleeding] : easy bleeding [Easy Bruising] : no easy bruising [Swollen Glands] : no swollen glands [Negative] : Psychiatric [FreeTextEntry8] : recurrent UTI and on antibiotic, virginal bleeding x2days with UTI [de-identified] : virginal bleeding x2days  [Earache] : no earache [Hearing Loss] : no hearing loss [Nosebleed] : no nosebleeds [Hoarseness] : no hoarseness [Nasal Discharge] : no nasal discharge [Postnasal Drip] : no postnasal drip [Joint Stiffness] : no joint stiffness [Joint Swelling] : no joint swelling [Muscle Weakness] : no muscle weakness [Muscle Pain] : no muscle pain [Back Pain] : no back pain [Itching] : no itching [Mole Changes] : no mole changes [Nail Changes] : no nail changes [Hair Changes] : no hair changes [Skin Rash] : no skin rash [Headache] : no headache [Dizziness] : no dizziness [Fainting] : no fainting [Confusion] : no confusion [Memory Loss] : no memory loss [FreeTextEntry4] : oral thrush  [FreeTextEntry9] : OA with joint pain [de-identified] : bruises to left upper arm, right hip and lateral side of left eye due to fall [de-identified] : using rollator in home and outside

## 2024-01-10 NOTE — COUNSELING
[Overweight - ( BMI 25.1 - 29.9 )] : overweight -  ( BMI 25.1 - 29.9 ) [DASH diet recommended] : DASH diet recommended [Sodium restriction 2gm recommended] : sodium restriction 2 gm recommended [Non - Smoker] : non-smoker [Smoke/CO Detectors] : smoke/CO detectors [Use grab bars] : use grab bars [Use assistive device to avoid falls] : use assistive device to avoid falls [Remove clutter and unsafe carpeting to avoid falls] : remove clutter and unsafe carpeting to avoid falls [] : foot exam [Patient not on disease-modifying anti-rheumatic drug due to overall prognosis] : Patient not on disease-modifying anti-rheumatic drug due to overall prognosis [Completed] : Aspirin use discussion completed [Decrease hospital use] : decrease hospital use [Minimize unnecessary interventions] : minimize unnecessary interventions [Discussed disease trajectory with patient/caregiver] : discussed disease trajectory with patient/caregiver [Advanced Directives discussed: ____] : Advanced directives discussed: [unfilled] [Completed Medical Orders for Life-Sustaining Treatment] : completed medical orders for life-sustaining treatment [Full Code] : Code Status: Full Code [No Limitations] : Treatment Guidelines: No limitations [Long Term Intubation] : Intubation: Long term intubation [FreeTextEntry3] : renal diet [FreeTextEntry2] : requested for medical alart

## 2024-01-11 ENCOUNTER — NON-APPOINTMENT (OUTPATIENT)
Age: 74
End: 2024-01-11

## 2024-01-17 ENCOUNTER — APPOINTMENT (OUTPATIENT)
Dept: INTERNAL MEDICINE | Facility: CLINIC | Age: 74
End: 2024-01-17

## 2024-01-26 ENCOUNTER — APPOINTMENT (OUTPATIENT)
Dept: ORTHOPEDIC SURGERY | Facility: CLINIC | Age: 74
End: 2024-01-26

## 2024-01-29 ENCOUNTER — NON-APPOINTMENT (OUTPATIENT)
Age: 74
End: 2024-01-29

## 2024-01-30 ENCOUNTER — APPOINTMENT (OUTPATIENT)
Dept: HEMATOLOGY ONCOLOGY | Facility: CLINIC | Age: 74
End: 2024-01-30

## 2024-01-31 ENCOUNTER — APPOINTMENT (OUTPATIENT)
Dept: INTERNAL MEDICINE | Facility: CLINIC | Age: 74
End: 2024-01-31
Payer: MEDICARE

## 2024-01-31 ENCOUNTER — APPOINTMENT (OUTPATIENT)
Dept: INFUSION THERAPY | Facility: CLINIC | Age: 74
End: 2024-01-31

## 2024-01-31 VITALS
DIASTOLIC BLOOD PRESSURE: 87 MMHG | WEIGHT: 140 LBS | TEMPERATURE: 97.3 F | SYSTOLIC BLOOD PRESSURE: 131 MMHG | HEART RATE: 99 BPM | OXYGEN SATURATION: 98 % | BODY MASS INDEX: 24.8 KG/M2 | HEIGHT: 63 IN

## 2024-01-31 PROCEDURE — 99213 OFFICE O/P EST LOW 20 MIN: CPT

## 2024-02-02 NOTE — HISTORY OF PRESENT ILLNESS
[FreeTextEntry1] : Interim reviewed. Will discuss plans with Oncologist.  FS at home  120  [de-identified] : Unclear if she has completed chemo.  Will get home attendant.

## 2024-02-05 ENCOUNTER — APPOINTMENT (OUTPATIENT)
Dept: CT IMAGING | Facility: HOSPITAL | Age: 74
End: 2024-02-05

## 2024-02-06 ENCOUNTER — OUTPATIENT (OUTPATIENT)
Dept: OUTPATIENT SERVICES | Facility: HOSPITAL | Age: 74
LOS: 1 days | End: 2024-02-06
Payer: MEDICARE

## 2024-02-06 ENCOUNTER — APPOINTMENT (OUTPATIENT)
Dept: CT IMAGING | Facility: HOSPITAL | Age: 74
End: 2024-02-06

## 2024-02-06 DIAGNOSIS — Z96.641 PRESENCE OF RIGHT ARTIFICIAL HIP JOINT: Chronic | ICD-10-CM

## 2024-02-06 PROCEDURE — 71260 CT THORAX DX C+: CPT | Mod: 26

## 2024-02-06 PROCEDURE — 82565 ASSAY OF CREATININE: CPT

## 2024-02-06 PROCEDURE — 71260 CT THORAX DX C+: CPT

## 2024-02-06 PROCEDURE — 74177 CT ABD & PELVIS W/CONTRAST: CPT

## 2024-02-06 PROCEDURE — 74177 CT ABD & PELVIS W/CONTRAST: CPT | Mod: 26

## 2024-02-07 ENCOUNTER — NON-APPOINTMENT (OUTPATIENT)
Age: 74
End: 2024-02-07

## 2024-02-12 ENCOUNTER — APPOINTMENT (OUTPATIENT)
Dept: ENDOCRINOLOGY | Facility: CLINIC | Age: 74
End: 2024-02-12
Payer: MEDICARE

## 2024-02-12 VITALS
BODY MASS INDEX: 24.98 KG/M2 | HEART RATE: 80 BPM | SYSTOLIC BLOOD PRESSURE: 154 MMHG | DIASTOLIC BLOOD PRESSURE: 75 MMHG | WEIGHT: 141 LBS

## 2024-02-12 LAB — GLUCOSE BLDC GLUCOMTR-MCNC: 394

## 2024-02-12 PROCEDURE — 82962 GLUCOSE BLOOD TEST: CPT

## 2024-02-12 PROCEDURE — 99214 OFFICE O/P EST MOD 30 MIN: CPT | Mod: 25

## 2024-02-12 RX ORDER — INSULIN GLARGINE 100 [IU]/ML
100 INJECTION, SOLUTION SUBCUTANEOUS
Qty: 1 | Refills: 5 | Status: ACTIVE | COMMUNITY
Start: 2023-12-19 | End: 1900-01-01

## 2024-02-12 RX ORDER — PIOGLITAZONE HYDROCHLORIDE 15 MG/1
15 TABLET ORAL DAILY
Qty: 30 | Refills: 3 | Status: DISCONTINUED | COMMUNITY
Start: 2024-01-10 | End: 2024-02-12

## 2024-02-12 RX ORDER — REPAGLINIDE 1 MG/1
1 TABLET ORAL 3 TIMES DAILY
Qty: 90 | Refills: 3 | Status: DISCONTINUED | COMMUNITY
Start: 2023-10-30 | End: 2024-02-12

## 2024-02-12 RX ORDER — INSULIN ASPART 100 [IU]/ML
100 INJECTION, SOLUTION INTRAVENOUS; SUBCUTANEOUS
Qty: 1 | Refills: 5 | Status: ACTIVE | COMMUNITY
Start: 2023-12-13 | End: 1900-01-01

## 2024-02-12 RX ORDER — SITAGLIPTIN 100 MG/1
100 TABLET, FILM COATED ORAL
Qty: 90 | Refills: 3 | Status: DISCONTINUED | COMMUNITY
End: 2024-02-12

## 2024-02-12 NOTE — HISTORY OF PRESENT ILLNESS
[FreeTextEntry1] : Diabetes for about 10 years.  Multiple aunts and uncles have diabetes but not her parents. Diagnosed with metastatic lung cancer to brain after presenting with falls and dizziness. She was treated with chemo and RT. Chemo schedule is every 3 weeks and the night before, she takes 20mg dexamethasone at 10pm and the morning of, she takes another 20mg dexamethasone.   Her sugars go into the 500s by the time she wakes up that morning.  Sugars come down pretty quickly (the next day). She was admitted for UTI and chemo was delayed, she doesn't know when her next treatment will be. Right now, glucose is 394. She ate a bagel and didn't take any Novolog.  For dinner, she likes to eat fried chicken, 2-4 pieces of small wings. Appetite is good, no nausea.  no headache.   She fell two days at  home, tripped over her rug. She bruised her face. labs reviewed from 1/24: A1c 9.9%.  normal TSH in December.  Meds Lantus 17 units hs Novolog 5 units with meals Januvia Farxiga-- recently discontinued (presumably after her UTI) amlodipine 10mg atorvastatin 40mg

## 2024-02-12 NOTE — PHYSICAL EXAM
[Alert] : alert [No Acute Distress] : no acute distress [No Proptosis] : no proptosis [No Lid Lag] : no lid lag [Normal Hearing] : hearing was normal [No LAD] : no lymphadenopathy [Clear to Auscultation] : lungs were clear to auscultation bilaterally [Normal S1, S2] : normal S1 and S2 [Regular Rhythm] : with a regular rhythm [Normal Sensation on Monofilament Testing] : normal sensation on monofilament testing of lower extremities [Normal Affect] : the affect was normal [Normal Mood] : the mood was normal [Foot Ulcers] : no foot ulcers [de-identified] : glucose 394, pre lunch [de-identified] : thyroid not palpable [de-identified] : 3-4+ LE edema [de-identified] : dystrophic nails

## 2024-02-12 NOTE — ASSESSMENT
[FreeTextEntry1] : Diabetes, hyperglycemia due to steroids for chemo. 5 units Lyumjev given in office for glucose over 350. Discussed the need to inject Novolog every time she eats.  I adjusted her insulin to:  Lantus 18 units hs, and Novolog 6 units with meals.  For days she takes dexamethasone:   Lantus 28 units, Novolog 12 units with meals. Discontinue all diabetes oral agents (Januvia, Farxiga, and if she is on pioglitazone and repaglinide which are on her med list but she says she is not taking these). SHe is not interested in wearing CGM (doesn't want to wear a device). I recommended avoiding bagels; change to English muffin.   Raisin bread is ok (this is what she used to eat).  Avoid sugared drinks, including juices, ginger ale, regular soda (diet ok). continue statin therapy RTO 2 month

## 2024-02-13 ENCOUNTER — APPOINTMENT (OUTPATIENT)
Dept: HOME HEALTH SERVICES | Facility: HOME HEALTH | Age: 74
End: 2024-02-13

## 2024-02-13 DIAGNOSIS — B37.0 CANDIDAL ESOPHAGITIS: ICD-10-CM

## 2024-02-13 DIAGNOSIS — B37.81 CANDIDAL ESOPHAGITIS: ICD-10-CM

## 2024-02-13 LAB
ALBUMIN SERPL ELPH-MCNC: 4.4 G/DL
ALP BLD-CCNC: 97 U/L
ALT SERPL-CCNC: 12 U/L
ANION GAP SERPL CALC-SCNC: 13 MMOL/L
AST SERPL-CCNC: 23 U/L
BILIRUB SERPL-MCNC: 0.3 MG/DL
BUN SERPL-MCNC: 15 MG/DL
CALCIUM SERPL-MCNC: 10.2 MG/DL
CHLORIDE SERPL-SCNC: 101 MMOL/L
CO2 SERPL-SCNC: 26 MMOL/L
CREAT SERPL-MCNC: 1.31 MG/DL
EGFR: 43 ML/MIN/1.73M2
ESTIMATED AVERAGE GLUCOSE: 237 MG/DL
GLUCOSE SERPL-MCNC: 205 MG/DL
HBA1C MFR BLD HPLC: 9.9 %
POTASSIUM SERPL-SCNC: 3.9 MMOL/L
PROT SERPL-MCNC: 7.4 G/DL
SODIUM SERPL-SCNC: 139 MMOL/L

## 2024-02-13 NOTE — REASON FOR VISIT
[Follow-Up] : a follow-up visit [Other: _____] : [unfilled] [Pre-Visit Preparation] : pre-visit preparation was done [Intercurrent Specialty/Sub-specialty Visits] : the patient has intercurrent specialty/sub-specialty visits [FreeTextEntry1] : DM2 on insulin, Right hip joint replacement, herniated disc, HLD, HTN, Lung CA metastatic to brain on chemo therapy, Osteoarthritis, obesity, CKD stage 3, Osteopenia, osteoporosis, Rheumatoid arthritis, venous insufficiency, osteoporosis, UTI   [FreeTextEntry2] : chart reviewed [FreeTextEntry3] : oncology, hematology

## 2024-02-13 NOTE — COUNSELING
[Overweight - ( BMI 25.1 - 29.9 )] : overweight -  ( BMI 25.1 - 29.9 ) [DASH diet recommended] : DASH diet recommended [Sodium restriction 2gm recommended] : sodium restriction 2 gm recommended [Non - Smoker] : non-smoker [Smoke/CO Detectors] : smoke/CO detectors [Use grab bars] : use grab bars [Use assistive device to avoid falls] : use assistive device to avoid falls [Remove clutter and unsafe carpeting to avoid falls] : remove clutter and unsafe carpeting to avoid falls [] : foot exam [Patient not on disease-modifying anti-rheumatic drug due to overall prognosis] : Patient not on disease-modifying anti-rheumatic drug due to overall prognosis [Not Recommended] : Aspirin use not recommended due to overall prognosis [Decrease hospital use] : decrease hospital use [Minimize unnecessary interventions] : minimize unnecessary interventions [Discussed disease trajectory with patient/caregiver] : discussed disease trajectory with patient/caregiver [Full Code] : Code Status: Full Code [No Limitations] : Treatment Guidelines: No limitations [Long Term Intubation] : Intubation: Long term intubation [Bone Density] : Bone Density [Mammogram] : Mammogram [Lung Cancer Screening in qualified smokers] : Lung Canser Screening [Colon Cancer Screening] : Colon Cancer Screening [FreeTextEntry3] : renal diet [FreeTextEntry2] : requested for medical alart

## 2024-02-13 NOTE — CURRENT MEDS
[Adherent to medications] : Patient is adherent to medications as prescribed [Medication and Allergies Reconciled] : medication and allergies reconciled [High Risk Medications Reviewed and Reconciled (Beers Criteria)] : high risk medications reviewed, reconciled [Reviewed patient reported medication adherence from Comprehensive Assessment] : reviewed patient reported medication adherence from comprehensive assessment [Adherentt to medications as prescribed] : the patient is adherent to medications as prescribed

## 2024-02-13 NOTE — REVIEW OF SYSTEMS
[Joint Pain] : joint pain [Unsteady Walking] : ataxia [Negative] : Heme/Lymph [Joint Stiffness] : no joint stiffness [Joint Swelling] : no joint swelling [Muscle Weakness] : no muscle weakness [Muscle Pain] : no muscle pain [Back Pain] : no back pain [Itching] : no itching [Mole Changes] : no mole changes [Nail Changes] : no nail changes [Hair Changes] : no hair changes [Skin Rash] : no skin rash [Headache] : no headache [Dizziness] : no dizziness [Fainting] : no fainting [Confusion] : no confusion [Memory Loss] : no memory loss [FreeTextEntry9] : OA with joint pain [de-identified] : bruises to face and under b/ eyes s/p fall [de-identified] : using rollator in home and outside

## 2024-02-13 NOTE — HEALTH RISK ASSESSMENT
[Independent] : managing finances [Some assistance needed] : doing laundry [Two or more falls in past year] : Patient reported two or more falls in the past year [Yes] : The patient has visual impairment [HRA Reviewed] : Health Risk Assessment reviewed [FreeTextEntry8] : using rollator in the home and outside [TimeGetUpGo] : 4 [de-identified] : use rollator in and outside as assistive device. [[facility]] : 4 [FreeTextEntry2] : use rollator assisted in walking

## 2024-02-13 NOTE — HISTORY OF PRESENT ILLNESS
[[employer] Co-Management Patient] : [unfilled] is a [employer] co-management patient [PT] : PT [Referred] : has been referred for aide services [Patient is stable] : patient is stable [Education provided] : education provided [LastPCPVisitDate] : 11/23 [FreeTextEntry4] : hematology, ONCOLOGY, CARDIOLOGY, endocrinology, RHEUMATOLOGY [Patient] : patient [FreeTextEntry1] : Not homebound [FreeTextEntry2] : JACEK RODRIGUEZ, 73 y.o. female with PMHx DM2 on insulin, Right hip joint replacement, herniated disc, HLD, HTN, Lung CA metastatic to brain on chemo therapy, Osteoarthritis, obesity, CKD stage 3, Osteopenia, osteoporosis, Rheumatoid arthritis, venous insufficiency, osteoporosis, UTI  is being seen for a visit provided via telehealth real-time audio visual technology. JACEK RODRIGUEZ was located at their home, [address], [place] [address] at the time of the visit. The [facility] clinician, RADHA GUPTA, was located remotely at their home in [place] at the time of the visit. The patient, JACEK RODRIGUEZ, and the [facility] clinician, RADHA GUPTA, participated in the telehealth encounter. There was no other participants.   JACEK RODRIGUEZ, 1950  consents to the use of telehealth. All questions related to telehealth answered.  Interim:  Patient reports getting third chemo therapy treatment on  02/09/24, denies any adverse reaction from therapy. Reports accidental fall with injury of bruising in face which happens about 1 week ago. Fall occurrence in apartment after a tripped on rug. Patient denies chest pain, palpitations, discomfort, distress, dizziness, headache and n/v.   Next appointment with oncology/hematology 02/22/24   Medication reconciled with no changes.

## 2024-02-13 NOTE — CHRONIC CARE ASSESSMENT
[Inadequate social support] : inadequate social support [Less than 3 Times Per Week] : exercises less than 3 times per week [> 30 Minutes/Session] : (> 30 minutes per session) [Walking] : walking [Diabetic Diet] : diabetic [Low Fat Diet] : low fat [Low Carb Diet] : low carbohydrate [Low Salt Diet] : low salt [General Adherence] : and is generally adherent [Reviewed depression screen from comprehensive assessment] : Reviewed depression screen from comprehensive assessment [PPS Score: ____] : Palliative Performance Scale (PPS) Score: [unfilled] [de-identified] : Lung CA metastatic to brain and on chemo therapy [de-identified] : low sodium, low fat, low carb, diabetic diet [Over the Past 2 Weeks, Have You Felt Down, Depressed, or Hopeless?] : 1.) Over the past 2 weeks, have you felt down, depressed, or hopeless? No [Over the Past 2 Weeks, Have You Felt Little Interest or Pleasure Doing Things?] : 2.) Over the past 2 weeks, have you felt little interest or pleasure doing things? No [de-identified] : Denies pain [de-identified] : multiple falls with injury bruises to the face [de-identified] : walks as tolerated [de-identified] : low sodium, low fat, low carb, diabetic diet

## 2024-02-16 ENCOUNTER — APPOINTMENT (OUTPATIENT)
Dept: MRI IMAGING | Facility: HOSPITAL | Age: 74
End: 2024-02-16

## 2024-02-16 NOTE — HISTORY OF PRESENT ILLNESS
[Disease: _____________________] : Disease: [unfilled] [de-identified] : Noemi Minor is a 73-year-old female who presents to the clinic for follow up of NSCLC with BMs.  Onc Hx: Ex-smoker with history of CKD, baseline creatinine is around 2, diabetes, rheumatoid arthritis, HTN. She was experiencing 1 week of daily falls with lower extremity weakness without dizziness, was admitted to [facility]. MRI brain showed 0.9 cm right frontal lobe mass with surrounding vasogenic edema. CT chest abdomen pelvis revealed left upper lobe mass 2.6 x 4.1 cm in posterior aspect of left upper lobe abutting major fissure.  The mass extends to adjacent left hilum and laterally to left lateral pleura.  1.5 cm left lobe of liver lesion.  1.2 cm pancreatic body cystic lesion. 7/14/2023 bronchoscopy-lingular biopsy poorly differentiated adenocarcinoma, positive TTF-1, TMB 17.3, PD-L1 negative. Tier II: Variants of Potential Clinical Significance STK11 p.([id number]) PIK3CA p.([id number]) TP53 p.([id number]) Tier III: Variants of Unknown Clinical Significance ALK p.([id number]) 8/2023: She [place] up at [facility] where she received her first chemotherapy cycle, Taxol/pembrolizumab only due to carboplatin shortage. Taxol was given because her labs there showed a GFR<45, excluding her from being able to receive pemetrexed. She tolerated treatment reasonably well, without any neuropathy or cytopenias. She lost he hair after first chemotherapy cycle. 9/5/2023: PET at [facility]: Left upper lobe perihilar hypermetabolic mass consistent with biopsy-proven malignancy.  Mildly FDG avid right thyroid nodule, correlate with thyroid ultrasound. 9/7/2023: Received cycle 1 paclitaxel/pembrolizumab at [facility] (Cr 1.2 there) 10/10/2023: MRIB: Since 7/11/2023, right posterior frontal enhancing lesion and vasogenic edema are completely resolved as a favorable response to therapy. No new enhancing lesion.  11/16/23 onc clinic  74 y/o f with h/o NSCLC w/BM is here for follow up and tx. Patient reports of feeling well. No ac complaints at this time. Patient's tx was held due to hyperglycemia 478, mildly elevated Lfts that were not her baseline and hyponatremia. Patient does report of eating ice cream/ cake the night before and found eating an orange this morning.   11/22/23 onc clinic  74 y/o f with NSCLC w/BM returns to clinic for f/u and tx. Patient states last week she was sent to the ED and her hyperglycemia was medically managed. She was also tx for a UTI. She reports of feeling well and has no new complaints.   12/18/23  onc clinic  74 y/o f with NSCLC with mets. present today for glucose check. Patient reports of feeling frequency in urination. Denies fevers, n,v, abd pain, dysuria or hematuria.   2/6/24: CT CAP:Since July 11, 2023, significantly decreased left upper lobe lesion. No suspicious lymphadenopathy. No new disease in the chest abdomen or pelvis. Stable incidental findings above. [de-identified] : Adenocarcinoma [de-identified] : Medonc ([facility] [place]): Dr.Rosa Jeffrey NeuroSx: Dr.[facility]: Dr. Wernicke [FreeTextEntry1] : 9/7/2023: Taxol/pembrolizumab C1 given at [facility] [place] (no carboplatin due to national shortage) 10/26/2023: C2 carboplatin/Taxol/pembrolizumab 11/16/23 C3 carboplatin/taxol/pembro (held due to hyperglycemia, elevated lfts and hyponatremia) 11/22/23  C3 carboplatin/taxol/pembro 12/13/23 C4 held due to hyperglycemia.  [de-identified] : Feeling ok. Offers no complaints. No irAE or any side effect to treatment last week. [100: Normal, no complaints, no evidence of disease.] : 100: Normal, no complaints, no evidence of disease. [ECOG Performance Status: 0 - Fully active, able to carry on all pre-disease performance without restriction] : Performance Status: 0 - Fully active, able to carry on all pre-disease performance without restriction

## 2024-02-16 NOTE — ASSESSMENT
[FreeTextEntry1] : 74 yo F with NSCLC with BMs and liver met (STK11 mutant, PIK3CA mutant, TP53 mutant, PD-L1 negative).  NSCLC - BMs s/p SRS, liver met. Re[place] C1 paclitaxel/pembrolizumab on 9/7/2023 [place] [facility] [place] This regimen was favored over carbo/pemetrexed/pembrolizumab due to CKD and baseline GFR less than 45. C2 carbo/Taxol/pembro on 10/26/23 Dose reduced Taxol by 25%, carboplatin calculated dose for Cr of 1.3 We will monitor for another rheumatoid arthritis flare, sent rx for prednisone 40 mg daily just in case she devlopes an RA flare. Patient advised not to take it unless RA flare. She has Zofran, Immodium and dexamethasone at home. She will be due for scans after cycle 3. RTC with C3. All questions answered in detail.  Rheumatoid arthritis Being managed by Dr. Johnston  CKD Monitor  11/16/23 onc clinic  a/p NSCLC w/ BM  NAD, well appearing and VSS Labs: hyperglycemia 478, elevated lfts not at baseline and hyponatremia  Tx held C3, sent to ED for medical management.  R/s appt for 11/22/23 Pt was advised to not binge on sweets or day of tx.  C/w meds prior to chemo and while on chemo RTC 3wks for f/u and tx  .Encourage to contact the office for any concerns or worsening symptoms. Pt verbalizes understanding.  11/22/23 onc clinic a/p NSCLC w/BM Well appearing, NAD and VSS Labs: cbc wnl, CMP elevated lfts and mildly elevated creatinine Dose adjusted for taxol C/w with dexamethasone prior to chemo and day of Hold prednisone night before chemo and day of while taking dexa  c/w folic acid RTC in 3wks .Encourage to contact the office for any concerns or worsening symptoms. Pt verbalizes understanding   12/14/23 onc clinic  a/p NSCLC w/ mets  NAD and VSS Labs: negative anion gap, Glucose 571, creatnine 1.40  Patient with urinary frequency and hyperglycemia. Pt was sent to ED for hyperglycemia medical management.  Will hold C4 due to hyperglycemia. RTC upon ED dc for follow up.  .Encourage to contact the office for any concerns or worsening symptoms. Pt verbalizes understanding

## 2024-02-21 ENCOUNTER — APPOINTMENT (OUTPATIENT)
Dept: HOME HEALTH SERVICES | Facility: HOME HEALTH | Age: 74
End: 2024-02-21
Payer: MEDICARE

## 2024-02-21 VITALS
OXYGEN SATURATION: 99 % | DIASTOLIC BLOOD PRESSURE: 68 MMHG | TEMPERATURE: 98.9 F | RESPIRATION RATE: 19 BRPM | SYSTOLIC BLOOD PRESSURE: 132 MMHG | HEART RATE: 74 BPM | HEIGHT: 63 IN

## 2024-02-21 DIAGNOSIS — C34.90 MALIGNANT NEOPLASM OF UNSPECIFIED PART OF UNSPECIFIED BRONCHUS OR LUNG: ICD-10-CM

## 2024-02-21 DIAGNOSIS — T45.1X5A ADVERSE EFFECT OF ANTINEOPLASTIC AND IMMUNOSUPPRESSIVE DRUGS, INITIAL ENCOUNTER: ICD-10-CM

## 2024-02-21 DIAGNOSIS — M85.80 OTHER SPECIFIED DISORDERS OF BONE DENSITY AND STRUCTURE, UNSPECIFIED SITE: ICD-10-CM

## 2024-02-21 PROCEDURE — 99349 HOME/RES VST EST MOD MDM 40: CPT

## 2024-02-21 RX ORDER — CHLORHEXIDINE GLUCONATE, 0.12% ORAL RINSE 1.2 MG/ML
0.12 SOLUTION DENTAL
Qty: 900 | Refills: 0 | Status: DISCONTINUED | COMMUNITY
Start: 2024-01-10 | End: 2024-02-21

## 2024-02-22 ENCOUNTER — APPOINTMENT (OUTPATIENT)
Dept: HEMATOLOGY ONCOLOGY | Facility: CLINIC | Age: 74
End: 2024-02-22

## 2024-02-22 ENCOUNTER — APPOINTMENT (OUTPATIENT)
Dept: INFUSION THERAPY | Facility: CLINIC | Age: 74
End: 2024-02-22

## 2024-02-23 ENCOUNTER — OUTPATIENT (OUTPATIENT)
Dept: OUTPATIENT SERVICES | Facility: HOSPITAL | Age: 74
LOS: 1 days | End: 2024-02-23
Payer: MEDICARE

## 2024-02-23 ENCOUNTER — APPOINTMENT (OUTPATIENT)
Dept: MRI IMAGING | Facility: HOSPITAL | Age: 74
End: 2024-02-23

## 2024-02-23 DIAGNOSIS — Z96.641 PRESENCE OF RIGHT ARTIFICIAL HIP JOINT: Chronic | ICD-10-CM

## 2024-02-23 PROCEDURE — 70553 MRI BRAIN STEM W/O & W/DYE: CPT

## 2024-02-23 PROCEDURE — 70553 MRI BRAIN STEM W/O & W/DYE: CPT | Mod: 26

## 2024-02-23 PROCEDURE — A9585: CPT

## 2024-02-27 ENCOUNTER — LABORATORY RESULT (OUTPATIENT)
Age: 74
End: 2024-02-27

## 2024-02-27 ENCOUNTER — APPOINTMENT (OUTPATIENT)
Dept: HEMATOLOGY ONCOLOGY | Facility: CLINIC | Age: 74
End: 2024-02-27

## 2024-02-27 ENCOUNTER — APPOINTMENT (OUTPATIENT)
Dept: HEMATOLOGY ONCOLOGY | Facility: CLINIC | Age: 74
End: 2024-02-27
Payer: MEDICARE

## 2024-02-27 ENCOUNTER — NON-APPOINTMENT (OUTPATIENT)
Age: 74
End: 2024-02-27

## 2024-02-27 VITALS
BODY MASS INDEX: 24.1 KG/M2 | RESPIRATION RATE: 18 BRPM | WEIGHT: 136 LBS | HEIGHT: 63 IN | TEMPERATURE: 97.5 F | SYSTOLIC BLOOD PRESSURE: 144 MMHG | DIASTOLIC BLOOD PRESSURE: 90 MMHG | OXYGEN SATURATION: 96 % | HEART RATE: 90 BPM

## 2024-02-27 LAB
ALBUMIN SERPL ELPH-MCNC: 3.5 G/DL
ALP BLD-CCNC: 173 U/L
ALT SERPL-CCNC: 39 U/L
ANION GAP SERPL CALC-SCNC: 6 MMOL/L
AST SERPL-CCNC: 32 U/L
BILIRUB SERPL-MCNC: 0.5 MG/DL
BUN SERPL-MCNC: 19 MG/DL
CALCIUM SERPL-MCNC: 9.9 MG/DL
CHLORIDE SERPL-SCNC: 99 MMOL/L
CO2 SERPL-SCNC: 29 MMOL/L
CREAT SERPL-MCNC: 1.3 MG/DL
EGFR: 43 ML/MIN/1.73M2
GLUCOSE SERPL-MCNC: 451 MG/DL
POTASSIUM SERPL-SCNC: 4.6 MMOL/L
PROT SERPL-MCNC: 7.1 G/DL
SODIUM SERPL-SCNC: 134 MMOL/L

## 2024-02-27 PROCEDURE — 99214 OFFICE O/P EST MOD 30 MIN: CPT

## 2024-02-27 PROCEDURE — G2211 COMPLEX E/M VISIT ADD ON: CPT

## 2024-03-01 ENCOUNTER — APPOINTMENT (OUTPATIENT)
Dept: ORTHOPEDIC SURGERY | Facility: CLINIC | Age: 74
End: 2024-03-01

## 2024-03-03 NOTE — HISTORY OF PRESENT ILLNESS
[de-identified] : Noemi Minor is a 73-year-old female with CKD, DM, RA, HTN who presents to the clinic for follow up of NSCLC with BMs.  Onc Hx: Ex-smoker with history of CKD, baseline creatinine is around 2, diabetes, rheumatoid arthritis, HTN. She was experiencing 1 week of daily falls with lower extremity weakness without dizziness, was admitted to [facility]. MRI brain showed 0.9 cm right frontal lobe mass with surrounding vasogenic edema. CT chest abdomen pelvis revealed left upper lobe mass 2.6 x 4.1 cm in posterior aspect of left upper lobe abutting major fissure.  The mass extends to adjacent left hilum and laterally to left lateral pleura.  1.5 cm left lobe of liver lesion.  1.2 cm pancreatic body cystic lesion. 7/14/2023 bronchoscopy-lingular biopsy poorly differentiated adenocarcinoma, positive TTF-1, TMB 17.3, PD-L1 negative. Tier II: Variants of Potential Clinical Significance STK11 p.([id number]) PIK3CA p.([id number]) TP53 p.([id number]) Tier III: Variants of Unknown Clinical Significance ALK p.([id number]) 8/2023: [place] [facility] chemotherapy cycle, Taxol/pembrolizumab only due to carboplatin shortage. Taxol was given because her labs there showed a GFR<45, excluding her from being able to receive pemetrexed. She tolerated treatment reasonably well, without any neuropathy or cytopenias. She lost he hair after first chemotherapy cycle. 9/5/2023: [place] [facility] Left upper lobe perihilar hypermetabolic mass consistent with biopsy-proven malignancy.  Mildly FDG avid right thyroid nodule, correlate with thyroid ultrasound. 9/7/2023: Re[place] cycle 1 [place] [facility] [place] 10/10/2023: MRIB: Since 7/11/2023, right posterior frontal enhancing lesion and vasogenic edema are completely resolved as a favorable response to therapy. No new enhancing lesion.  11/16/23 onc clinic  72 y/o f with h/o NSCLC w/BM is here for follow up and tx. Patient reports of feeling well. No ac complaints at this time. Patient's tx was held due to hyperglycemia 478, mildly elevated Lfts that were not her baseline and hyponatremia. Patient does report of eating ice cream/ cake the night before and found eating an orange this morning.   11/22/23 onc clinic  72 y/o f with NSCLC w/BM returns to clinic for f/u and tx. Patient states last week she was sent to the ED and her hyperglycemia was medically managed. She was also tx for a UTI. She reports of feeling well and has no new complaints.   12/18/23  onc clinic  72 y/o f with NSCLC with mets. present today for glucose check. Patient reports of feeling frequency in urination. Denies fevers, n,v, abd pain, dysuria or hematuria.   2/6/24: CT CAP:Since July 11, 2023, significantly decreased left upper lobe lesion. No suspicious lymphadenopathy. No new disease in the chest abdomen or pelvis. Stable incidental findings above. 2/23/24: MRI HEAD: No evidence of abnormal enhancement to suggest residual/recurrent metastatic disease. Chronic lacunar infarctions. Small right temporal occipital chronic infarction. Tiny left cerebellar chronic infarction. Microvascular disease. [de-identified] : Adenocarcinoma [de-identified] : [place]c ([facility] [place] Dr.Rosa Jeffrey NeuroSx: Dr[facility]: Dr. Wernicke [de-identified] : Feeling ok. Offers no complaints. No irAE or any side effect to treatment last week. [FreeTextEntry1] : 9/7/2023: Taxol/pembrolizumab C1 [place] [facility] [place] (no carboplatin due to national shortage) 10/26/2023: C2 carboplatin/Taxol/pembrolizumab 11/16/23 C3 carboplatin/taxol/pembro (held due to hyperglycemia, elevated lfts and hyponatremia) 11/22/23  C3 carboplatin/taxol/pembro 12/13/23 C4 held due to hyperglycemia.  1/9/2024: C4 carboplatin/Taxol/pembrolizumab

## 2024-03-03 NOTE — ASSESSMENT
[FreeTextEntry1] : 72 yo F with CKD, RA, DM, HTN here for NSCLC with BMs and liver met (STK11 mutant, PIK3CA mutant, TP53 mutant, PD-L1 negative).  NSCLC - BMs s/p SRS, liver met. Received C1 paclitaxel/pembrolizumab on 9/7/2023 at [facility] [place]. This regimen was favored over carbo/pemetrexed/pembrolizumab due to CKD and baseline GFR less than 45. She continued carboplatin/Taxol/pembrolizumab due to CKD Treatment was delayed and held several times because of uncontrolled hyperglycemia --patient very non-compliant with food restrictions Post 4 cycles induction chemoIO showing partial response with CT CAP 2/6/2024 (90% reduction in lung mass) and MRIB 2/23/2024 (s/p SRS, n/e of residual BMs) --start maintenance pembrolizumab 200 mg Q3 wks x 2 years --monitor labs: CBC, CMP, TSH each visit  CKD - current Cr 1.3 --stable  Hyperglycemia --patient was advised to go to ED today, but she is refusing. She stated she ate crab cakes, rise and a lot of carbs last night. She understands that she should go to ED with this glucose level, and polyuria, but she is refusing, understanding there is a risk of significant complications including HHS, DKA, organ failure and death. --continue to follow with endo   Rheumatoid arthritis Being managed by Dr. Johnston   [facility] with next Tx with MARIBELL Vee.

## 2024-03-03 NOTE — REVIEW OF SYSTEMS
[Abdominal Pain] : no abdominal pain [Fever] : no fever [Vomiting] : no vomiting [Dysuria] : no dysuria [FreeTextEntry8] : Frequency

## 2024-03-06 ENCOUNTER — NON-APPOINTMENT (OUTPATIENT)
Age: 74
End: 2024-03-06

## 2024-03-07 ENCOUNTER — NON-APPOINTMENT (OUTPATIENT)
Age: 74
End: 2024-03-07

## 2024-03-07 ENCOUNTER — APPOINTMENT (OUTPATIENT)
Dept: HEART AND VASCULAR | Facility: CLINIC | Age: 74
End: 2024-03-07
Payer: MEDICARE

## 2024-03-07 VITALS
TEMPERATURE: 98 F | SYSTOLIC BLOOD PRESSURE: 110 MMHG | DIASTOLIC BLOOD PRESSURE: 58 MMHG | WEIGHT: 135 LBS | HEIGHT: 63 IN | HEART RATE: 59 BPM | BODY MASS INDEX: 23.92 KG/M2 | OXYGEN SATURATION: 97 %

## 2024-03-07 DIAGNOSIS — I10 ESSENTIAL (PRIMARY) HYPERTENSION: ICD-10-CM

## 2024-03-07 PROCEDURE — 93000 ELECTROCARDIOGRAM COMPLETE: CPT

## 2024-03-07 PROCEDURE — 99212 OFFICE O/P EST SF 10 MIN: CPT | Mod: 25

## 2024-03-07 RX ORDER — PREDNISONE 20 MG/1
20 TABLET ORAL DAILY
Qty: 20 | Refills: 0 | Status: DISCONTINUED | COMMUNITY
Start: 2023-10-26 | End: 2024-03-07

## 2024-03-07 RX ORDER — AMLODIPINE BESYLATE 10 MG/1
10 TABLET ORAL
Qty: 90 | Refills: 3 | Status: ACTIVE | COMMUNITY
Start: 2017-06-01 | End: 1900-01-01

## 2024-03-07 NOTE — REASON FOR VISIT
[FreeTextEntry1] : Here for f/u of BP. In good spirits. Had recent imaging to evaluate brain mets, which showed resolution. BP at home stable (around 130/70 mmHg). Using walker for stability. No chest pain, SOB.

## 2024-03-11 ENCOUNTER — LABORATORY RESULT (OUTPATIENT)
Age: 74
End: 2024-03-11

## 2024-03-12 ENCOUNTER — NON-APPOINTMENT (OUTPATIENT)
Age: 74
End: 2024-03-12

## 2024-03-12 ENCOUNTER — APPOINTMENT (OUTPATIENT)
Dept: HEMATOLOGY ONCOLOGY | Facility: CLINIC | Age: 74
End: 2024-03-12
Payer: MEDICARE

## 2024-03-12 ENCOUNTER — LABORATORY RESULT (OUTPATIENT)
Age: 74
End: 2024-03-12

## 2024-03-12 ENCOUNTER — APPOINTMENT (OUTPATIENT)
Dept: HEMATOLOGY ONCOLOGY | Facility: CLINIC | Age: 74
End: 2024-03-12

## 2024-03-12 ENCOUNTER — OUTPATIENT (OUTPATIENT)
Dept: OUTPATIENT SERVICES | Facility: HOSPITAL | Age: 74
LOS: 1 days | End: 2024-03-12
Payer: MEDICARE

## 2024-03-12 ENCOUNTER — APPOINTMENT (OUTPATIENT)
Dept: INFUSION THERAPY | Facility: CLINIC | Age: 74
End: 2024-03-12

## 2024-03-12 VITALS
WEIGHT: 132.06 LBS | HEIGHT: 63 IN | RESPIRATION RATE: 18 BRPM | TEMPERATURE: 96 F | DIASTOLIC BLOOD PRESSURE: 75 MMHG | OXYGEN SATURATION: 98 % | SYSTOLIC BLOOD PRESSURE: 114 MMHG | HEART RATE: 74 BPM

## 2024-03-12 VITALS
HEIGHT: 63 IN | DIASTOLIC BLOOD PRESSURE: 75 MMHG | TEMPERATURE: 96.5 F | SYSTOLIC BLOOD PRESSURE: 114 MMHG | RESPIRATION RATE: 18 BRPM | OXYGEN SATURATION: 98 % | WEIGHT: 132 LBS | BODY MASS INDEX: 23.39 KG/M2 | HEART RATE: 74 BPM

## 2024-03-12 DIAGNOSIS — C34.90 MALIGNANT NEOPLASM OF UNSPECIFIED PART OF UNSPECIFIED BRONCHUS OR LUNG: ICD-10-CM

## 2024-03-12 DIAGNOSIS — Z96.641 PRESENCE OF RIGHT ARTIFICIAL HIP JOINT: Chronic | ICD-10-CM

## 2024-03-12 LAB
ALBUMIN SERPL ELPH-MCNC: 3.8 G/DL
ALP BLD-CCNC: 96 U/L
ALT SERPL-CCNC: 27 U/L
ANION GAP SERPL CALC-SCNC: 9 MMOL/L
AST SERPL-CCNC: 34 U/L
BILIRUB SERPL-MCNC: 0.7 MG/DL
BUN SERPL-MCNC: 33 MG/DL
CALCIUM SERPL-MCNC: 10.4 MG/DL
CHLORIDE SERPL-SCNC: 99 MMOL/L
CO2 SERPL-SCNC: 30 MMOL/L
CREAT SERPL-MCNC: 1.2 MG/DL
EGFR: 48 ML/MIN/1.73M2
GLUCOSE SERPL-MCNC: 358 MG/DL
POTASSIUM SERPL-SCNC: 4.9 MMOL/L
PROT SERPL-MCNC: 7.8 G/DL
SODIUM SERPL-SCNC: 138 MMOL/L

## 2024-03-12 PROCEDURE — 99214 OFFICE O/P EST MOD 30 MIN: CPT

## 2024-03-12 PROCEDURE — 96413 CHEMO IV INFUSION 1 HR: CPT

## 2024-03-12 PROCEDURE — G2211 COMPLEX E/M VISIT ADD ON: CPT | Mod: NC,1L

## 2024-03-12 RX ORDER — SODIUM CHLORIDE 9 MG/ML
1000 INJECTION INTRAMUSCULAR; INTRAVENOUS; SUBCUTANEOUS ONCE
Refills: 0 | Status: COMPLETED | OUTPATIENT
Start: 2024-03-12 | End: 2024-03-12

## 2024-03-12 RX ORDER — PEMBROLIZUMAB 25 MG/ML
200 INJECTION, SOLUTION INTRAVENOUS ONCE
Refills: 0 | Status: COMPLETED | OUTPATIENT
Start: 2024-03-12 | End: 2024-03-12

## 2024-03-12 RX ADMIN — SODIUM CHLORIDE 1000 MILLILITER(S): 9 INJECTION INTRAMUSCULAR; INTRAVENOUS; SUBCUTANEOUS at 16:45

## 2024-03-12 RX ADMIN — PEMBROLIZUMAB 200 MILLIGRAM(S): 25 INJECTION, SOLUTION INTRAVENOUS at 16:55

## 2024-03-12 RX ADMIN — SODIUM CHLORIDE 1000 MILLILITER(S): 9 INJECTION INTRAMUSCULAR; INTRAVENOUS; SUBCUTANEOUS at 17:40

## 2024-03-12 RX ADMIN — PEMBROLIZUMAB 200 MILLIGRAM(S): 25 INJECTION, SOLUTION INTRAVENOUS at 17:25

## 2024-03-13 LAB
MAGNESIUM SERPL-MCNC: 1.6 MG/DL
TSH SERPL-ACNC: 0.23 UIU/ML

## 2024-03-13 NOTE — REVIEW OF SYSTEMS
[Diarrhea: Grade 0] : Diarrhea: Grade 0 [Fever] : no fever [Abdominal Pain] : no abdominal pain [Vomiting] : no vomiting [Dysuria] : no dysuria [Negative] : Genitourinary [FreeTextEntry8] : Frequency

## 2024-03-13 NOTE — HISTORY OF PRESENT ILLNESS
[Disease: _____________________] : Disease: [unfilled] [100: Normal, no complaints, no evidence of disease.] : 100: Normal, no complaints, no evidence of disease. [ECOG Performance Status: 0 - Fully active, able to carry on all pre-disease performance without restriction] : Performance Status: 0 - Fully active, able to carry on all pre-disease performance without restriction [de-identified] : Noemi Minor is a 73-year-old female with CKD, DM, RA, HTN who presents to the clinic for follow up of NSCLC with BMs.  Onc Hx: Ex-smoker with history of CKD, baseline creatinine is around 2, diabetes, rheumatoid arthritis, HTN. She was experiencing 1 week of daily falls with lower extremity weakness without dizziness, was admitted to [facility]. MRI brain showed 0.9 cm right frontal lobe mass with surrounding vasogenic edema. CT chest abdomen pelvis revealed left upper lobe mass 2.6 x 4.1 cm in posterior aspect of left upper lobe abutting major fissure.  The mass extends to adjacent left hilum and laterally to left lateral pleura.  1.5 cm left lobe of liver lesion.  1.2 cm pancreatic body cystic lesion. 7/14/2023 bronchoscopy-lingular biopsy poorly differentiated adenocarcinoma, positive TTF-1, TMB 17.3, PD-L1 negative. Tier II: Variants of Potential Clinical Significance STK11 p.([id number]) PIK3CA p.([id number]) TP53 p.([id number]) Tier III: Variants of Unknown Clinical Significance ALK p.([id number]) 8/2023: She followed up at [facility] where she received her first chemotherapy cycle, Taxol/pembrolizumab only due to carboplatin shortage. Taxol was given because her labs there showed a GFR<45, excluding her from being able to receive pemetrexed. She tolerated treatment reasonably well, without any neuropathy or cytopenias. She lost he hair after first chemotherapy cycle. 9/5/2023: PET at [facility]: Left upper lobe perihilar hypermetabolic mass consistent with biopsy-proven malignancy.  Mildly FDG avid right thyroid nodule, correlate with thyroid ultrasound. 9/7/2023: Received cycle 1 paclitaxel/pembrolizumab at [facility] (Cr 1.2 there) 10/10/2023: MRIB: Since 7/11/2023, right posterior frontal enhancing lesion and vasogenic edema are completely resolved as a favorable response to therapy. No new enhancing lesion.  11/16/23 onc clinic  74 y/o f with h/o NSCLC w/BM is here for follow up and tx. Patient reports of feeling well. No ac complaints at this time. Patient's tx was held due to hyperglycemia 478, mildly elevated Lfts that were not her baseline and hyponatremia. Patient does report of eating ice cream/ cake the night before and found eating an orange this morning.   11/22/23 onc clinic  74 y/o f with NSCLC w/BM returns to clinic for f/u and tx. Patient states last week she was sent to the ED and her hyperglycemia was medically managed. She was also tx for a UTI. She reports of feeling well and has no new complaints.   12/18/23  onc clinic  74 y/o f with NSCLC with mets. present today for glucose check. Patient reports of feeling frequency in urination. Denies fevers, n,v, abd pain, dysuria or hematuria.   2/6/24: CT CAP:Since July 11, 2023, significantly decreased left upper lobe lesion. No suspicious lymphadenopathy. No new disease in the chest abdomen or pelvis. Stable incidental findings above. 2/23/24: MRI HEAD: No evidence of abnormal enhancement to suggest residual/recurrent metastatic disease. Chronic lacunar infarctions. Small right temporal occipital chronic infarction. Tiny left cerebellar chronic infarction. Microvascular disease.  3/12/24 onc clinic 73-year-old female with CKD, DM, RA, HTN who presents to the clinic for follow up of NSCLC with BMs. Patient present states to feel well and offers no ac complaints. She statesof taking her meds this morning and avoided high sugar foods  prior to visit as instructed. Patient is starting adjuvant  tx with pembrolizumab.  [de-identified] : Medonc ([facility] [place]): Dr.Rosa Jeffrey NeuroSx: Dr.D'Amico [facility]: Dr. Wernicke [de-identified] : Adenocarcinoma [FreeTextEntry1] : 9/7/2023: Taxol/pembrolizumab C1 given at [facility] [place] (no carboplatin due to national shortage) 10/26/2023: C2 carboplatin/Taxol/pembrolizumab 11/16/23 C3 carboplatin/taxol/pembro (held due to hyperglycemia, elevated lfts and hyponatremia) 11/22/23  C3 carboplatin/taxol/pembro 12/13/23 C4 held due to hyperglycemia.  1/9/2024: C4 carboplatin/Taxol/pembrolizumab  3/12/2024 C1 pembro  [de-identified] : Feeling ok. Offers no complaints. No irAE or any side effect to treatment last week.

## 2024-03-13 NOTE — ASSESSMENT
[FreeTextEntry1] : 72 yo F with CKD, RA, DM, HTN here for NSCLC with BMs and liver met (STK11 mutant, PIK3CA mutant, TP53 mutant, PD-L1 negative).  NSCLC - BMs s/p SRS, liver met. [place]1 [place] [facility] [place] This regimen was favored over carbo/pemetrexed/pembrolizumab due to CKD and baseline GFR less than 45. She continued carboplatin/Taxol/pembrolizumab due to CKD Treatment was delayed and held several times because of uncontrolled hyperglycemia --patient very non-compliant with food restrictions Post 4 cycles induction chemoIO showing partial response with CT CAP 2/6/2024 (90% reduction in lung mass) and MRIB 2/23/2024 (s/p SRS, n/e of residual BMs) --start maintenance pembrolizumab 200 mg Q3 wks x 2 years --monitor labs: CBC, CMP, TSH each visit  CKD - current Cr 1.3 --stable  Hyperglycemia --patient was advised to go to ED today, but she is refusing. She stated she ate crab cakes, rise and a lot of carbs last night. She understands that she should go to ED with this glucose level, and polyuria, but she is refusing, understanding there is a risk of significant complications including HHS, DKA, organ failure and death. --continue to follow with endo   Rheumatoid arthritis Being managed by Dr. Johnston   RTC with next Tx with MARIBELL Vee.  3/12/24  onc clinic a/p NSCLC w/mets  Labs : glucose 358 , no anion gap, nl creatinine. wbc 14.98 no ac signs of infection, h/h : 12.5/38.6 , plts 182 Positive for hyperglycemia will give 1 liter of NS  Proceed w/ pembro C1  Cont with current meds: sliding scale for DM , endo f/u Dr. Woodard for DM control cont to check prior to tx and s/s RTC in 3 wks .Encourage to contact the office for any concerns or worsening symptoms. Pt verbalizes understanding

## 2024-03-25 ENCOUNTER — NON-APPOINTMENT (OUTPATIENT)
Age: 74
End: 2024-03-25

## 2024-04-01 NOTE — DISCUSSION/SUMMARY
[FreeTextEntry1] : Spoke to patient, she reports of feeling unwell, dizzy and slipped off the chair this morning. Patient states she was tx for UTI last week at the ED and is taking the abx macrobid. She states of new cough x 2-3 days, with no fever or worsening sob. Patient states her FS have been in the 200 the last few days but has not checked it today. Patient was advised to see Dr. Douglass in office or if symptoms worsen to go back to the ED. Patient at this time refuses and states she wants to stay home. Patient's tx was r/s for next week for f/u and tx.

## 2024-04-02 ENCOUNTER — APPOINTMENT (OUTPATIENT)
Dept: HEMATOLOGY ONCOLOGY | Facility: CLINIC | Age: 74
End: 2024-04-02

## 2024-04-02 ENCOUNTER — LABORATORY RESULT (OUTPATIENT)
Age: 74
End: 2024-04-02

## 2024-04-02 ENCOUNTER — NON-APPOINTMENT (OUTPATIENT)
Age: 74
End: 2024-04-02

## 2024-04-02 ENCOUNTER — OUTPATIENT (OUTPATIENT)
Dept: OUTPATIENT SERVICES | Facility: HOSPITAL | Age: 74
LOS: 1 days | End: 2024-04-02
Payer: MEDICARE

## 2024-04-02 ENCOUNTER — APPOINTMENT (OUTPATIENT)
Dept: INFUSION THERAPY | Facility: CLINIC | Age: 74
End: 2024-04-02

## 2024-04-02 ENCOUNTER — APPOINTMENT (OUTPATIENT)
Dept: HEMATOLOGY ONCOLOGY | Facility: CLINIC | Age: 74
End: 2024-04-02
Payer: MEDICARE

## 2024-04-02 VITALS
OXYGEN SATURATION: 95 % | TEMPERATURE: 97.9 F | WEIGHT: 134 LBS | BODY MASS INDEX: 23.74 KG/M2 | HEIGHT: 63 IN | SYSTOLIC BLOOD PRESSURE: 146 MMHG | DIASTOLIC BLOOD PRESSURE: 82 MMHG | RESPIRATION RATE: 18 BRPM | HEART RATE: 94 BPM

## 2024-04-02 VITALS
TEMPERATURE: 98 F | SYSTOLIC BLOOD PRESSURE: 150 MMHG | RESPIRATION RATE: 17 BRPM | DIASTOLIC BLOOD PRESSURE: 71 MMHG | OXYGEN SATURATION: 98 %

## 2024-04-02 VITALS
HEIGHT: 63 IN | HEART RATE: 94 BPM | OXYGEN SATURATION: 95 % | DIASTOLIC BLOOD PRESSURE: 82 MMHG | SYSTOLIC BLOOD PRESSURE: 146 MMHG | WEIGHT: 134.04 LBS | TEMPERATURE: 98 F | RESPIRATION RATE: 20 BRPM

## 2024-04-02 DIAGNOSIS — C34.90 MALIGNANT NEOPLASM OF UNSPECIFIED PART OF UNSPECIFIED BRONCHUS OR LUNG: ICD-10-CM

## 2024-04-02 DIAGNOSIS — Z96.641 PRESENCE OF RIGHT ARTIFICIAL HIP JOINT: Chronic | ICD-10-CM

## 2024-04-02 LAB
ALBUMIN SERPL ELPH-MCNC: 3.8 G/DL
ALP BLD-CCNC: 113 U/L
ALT SERPL-CCNC: 20 U/L
ANION GAP SERPL CALC-SCNC: 7 MMOL/L
AST SERPL-CCNC: 32 U/L
BILIRUB SERPL-MCNC: 0.5 MG/DL
BUN SERPL-MCNC: 19 MG/DL
CALCIUM SERPL-MCNC: 10 MG/DL
CHLORIDE SERPL-SCNC: 99 MMOL/L
CO2 SERPL-SCNC: 28 MMOL/L
CREAT SERPL-MCNC: 1.2 MG/DL
EGFR: 48 ML/MIN/1.73M2
GLUCOSE BLDC GLUCOMTR-MCNC: 312 MG/DL — HIGH (ref 70–99)
GLUCOSE BLDC GLUCOMTR-MCNC: 347 MG/DL — HIGH (ref 70–99)
GLUCOSE BLDC GLUCOMTR-MCNC: 356 MG/DL — HIGH (ref 70–99)
GLUCOSE SERPL-MCNC: 368 MG/DL
MAGNESIUM SERPL-MCNC: 1.7 MG/DL
POTASSIUM SERPL-SCNC: 4.9 MMOL/L
PROT SERPL-MCNC: 7.5 G/DL
SODIUM SERPL-SCNC: 134 MMOL/L

## 2024-04-02 PROCEDURE — 36415 COLL VENOUS BLD VENIPUNCTURE: CPT

## 2024-04-02 PROCEDURE — 99214 OFFICE O/P EST MOD 30 MIN: CPT

## 2024-04-02 PROCEDURE — 96413 CHEMO IV INFUSION 1 HR: CPT

## 2024-04-02 PROCEDURE — 96372 THER/PROPH/DIAG INJ SC/IM: CPT

## 2024-04-02 PROCEDURE — 96361 HYDRATE IV INFUSION ADD-ON: CPT

## 2024-04-02 PROCEDURE — 82962 GLUCOSE BLOOD TEST: CPT

## 2024-04-02 PROCEDURE — G2211 COMPLEX E/M VISIT ADD ON: CPT | Mod: NC,1L

## 2024-04-02 RX ORDER — PEMBROLIZUMAB 25 MG/ML
200 INJECTION, SOLUTION INTRAVENOUS ONCE
Refills: 0 | Status: COMPLETED | OUTPATIENT
Start: 2024-04-02 | End: 2024-04-02

## 2024-04-02 RX ORDER — SODIUM CHLORIDE 9 MG/ML
1000 INJECTION INTRAMUSCULAR; INTRAVENOUS; SUBCUTANEOUS ONCE
Refills: 0 | Status: COMPLETED | OUTPATIENT
Start: 2024-04-02 | End: 2024-04-02

## 2024-04-02 RX ORDER — INSULIN LISPRO 100/ML
5 VIAL (ML) SUBCUTANEOUS ONCE
Refills: 0 | Status: COMPLETED | OUTPATIENT
Start: 2024-04-02 | End: 2024-04-02

## 2024-04-02 RX ORDER — INSULIN HUMAN 100 [IU]/ML
5 INJECTION, SOLUTION SUBCUTANEOUS ONCE
Refills: 0 | Status: DISCONTINUED | OUTPATIENT
Start: 2024-04-02 | End: 2024-04-02

## 2024-04-02 RX ADMIN — PEMBROLIZUMAB 200 MILLIGRAM(S): 25 INJECTION, SOLUTION INTRAVENOUS at 16:16

## 2024-04-02 RX ADMIN — SODIUM CHLORIDE 1000 MILLILITER(S): 9 INJECTION INTRAMUSCULAR; INTRAVENOUS; SUBCUTANEOUS at 15:50

## 2024-04-02 RX ADMIN — PEMBROLIZUMAB 200 MILLIGRAM(S): 25 INJECTION, SOLUTION INTRAVENOUS at 16:46

## 2024-04-02 RX ADMIN — Medication 5 UNIT(S): at 16:41

## 2024-04-02 RX ADMIN — SODIUM CHLORIDE 1000 MILLILITER(S): 9 INJECTION INTRAMUSCULAR; INTRAVENOUS; SUBCUTANEOUS at 14:50

## 2024-04-02 NOTE — CHART NOTE - NSCHARTNOTEFT_GEN_A_CORE
Pt seen by oncology team and labs drawn. Blood glucose on her CMP was 368. She has frequent hyperglycemia on her blood work and known DMII. She also reports she is currently on decadron. Her oncology team requested 1L NS bolus and repeat FS which was done and revealed blood glucose of 356. Discussed with the team and they are ok with proceeding with the pembrolizumab today. Will also administer 5u SQ insulin. Discussed importance of medication compliance and low carb/sugar diet with the patient. Pt seen by oncology team and labs drawn. Blood glucose on her CMP was 368. She has frequent hyperglycemia on her blood work and known DMII (on insulin). She also reports she is currently on decadron. Her oncology team requested 1L NS bolus and repeat FS which was done and revealed blood glucose of 356. Discussed with the team and they are ok with proceeding with the pembrolizumab today. Will also administer 5u SQ insulin. Discussed importance of medication compliance and low carb/sugar diet with the patient. Has a follow up with endocrinology tomorrow.

## 2024-04-03 ENCOUNTER — APPOINTMENT (OUTPATIENT)
Dept: ENDOCRINOLOGY | Facility: CLINIC | Age: 74
End: 2024-04-03

## 2024-04-04 ENCOUNTER — APPOINTMENT (OUTPATIENT)
Dept: HOME HEALTH SERVICES | Facility: HOME HEALTH | Age: 74
End: 2024-04-04

## 2024-04-04 LAB — TSH SERPL-ACNC: 0.28 UIU/ML

## 2024-04-05 ENCOUNTER — APPOINTMENT (OUTPATIENT)
Dept: ENDOCRINOLOGY | Facility: CLINIC | Age: 74
End: 2024-04-05
Payer: MEDICARE

## 2024-04-05 VITALS
BODY MASS INDEX: 24.62 KG/M2 | SYSTOLIC BLOOD PRESSURE: 135 MMHG | HEART RATE: 51 BPM | DIASTOLIC BLOOD PRESSURE: 71 MMHG | WEIGHT: 139 LBS

## 2024-04-05 LAB
GLUCOSE BLDC GLUCOMTR-MCNC: 284
HBA1C MFR BLD HPLC: 10.6

## 2024-04-05 PROCEDURE — 99214 OFFICE O/P EST MOD 30 MIN: CPT | Mod: 25

## 2024-04-05 PROCEDURE — 83036 HEMOGLOBIN GLYCOSYLATED A1C: CPT | Mod: QW

## 2024-04-05 PROCEDURE — 82962 GLUCOSE BLOOD TEST: CPT

## 2024-04-05 NOTE — REVIEW OF SYSTEMS
[Nausea] : no nausea [Abdominal Pain] : no abdominal pain [Vomiting] : no vomiting [Polyuria] : no polyuria [Polydipsia] : no polydipsia [Negative] : Psychiatric

## 2024-04-05 NOTE — HISTORY OF PRESENT ILLNESS
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   Patient of Dr. Esparza, last (initial) visit, 2/12/24   Interval change: Undergoing chemotherapy for metastatic lung cancer treatment which is co-adminstered with dexamethasone (20mg the night before and 20mg the morning of chemo) on Q3 week cycles. Most recent cycle on Tuesday, 4/2. Sugar was 300+ at time of chemo infusion. Since last visit, she discontinued all oral agents insulin therapy adjusted to basal of 18 units (Lantus) and prandial of 6 units before meals (Novolog).   Additionally, she increase her MDI regimen for days of taking dexamethasone, Lantus of 28 units and Novolog of 12 units with meals Endorses a few times a week missing prandial insulin dosing Previously stopped consuming soda and recently has been consuming more nic juice, so working to decrease fruit juice consumption Not interested in CGM for monitoring, ok with fingerstick BG monitoring    A1C - 10.6% (4/5/24) from 9.4% (1/2024) from 8.5% (11/2023) Office Fingerstick -- 284 (4H postprandial -- 2 slices of toast with butter, water, kefir)   Current medication: - Lantus 18 units daily - Novolog 6 units with meals Doses increase surrounding chemo cycles with dexamethasone administration to Lantus of 28 units daily and Novolog of 12 units before meals   Past medication: - Farxiga, d/c following UTI earlier in 2024 - [facility]   MICHAEL reports they take their diabetes medication most of the time. Endorses some missed prandial dosing  MICHAEL denies hypoglycemia symptoms or BG <70   Diabetes Self-Management: Monitoring BG ~2x daily Verbalizes sugar range as below -- fasting BG range: 120-200 -- before bedtime BG range of 200-250    Diet-- Typically consumes 3 meals/daily, infrequent snacks Beverages: water, intermittent fruit juice (working to decrease)  Typical meal times are: 8AM, 1-2PM, 6PM

## 2024-04-05 NOTE — ASSESSMENT
[Carbohydrate Consistent Diet] : carbohydrate consistent diet [Action and use of Insulin] : action and use of short and long-acting insulin [Self Monitoring of Blood Glucose] : self monitoring of blood glucose [Sick-Day Management] : sick-day management [FreeTextEntry1] : 1. T2D A1C goal <8% A1C - 10.6% (4/5/24) from 9.4% (1/2024) from 8.5% (11/2023) Current regimen:  Lantus 18 units daily, Novolog 6 units with meals *MDI dosing increase surrounding chemo cycles with dexamethasone administration to Lantus of 28 units daily and Novolog of 12 units before meals* Glucometer readings at home reveal fasting BG often above goal and postprandial BG always above goal. She denies noted marked differences on days following chemo than days not surrounding chemo cycles and states that sugars improve starting day after chemo. Glucometer reading performed in office today is above goal postprandial.  Worsening glycemic control on current regimen.  Noemi endorses occasional missed prandial insulin dosing occurring ~once to twice weekly.  She recently increased fruit juice consumption, stating that she forgot that she needs to limit this for sugar control.  Reviewed recommendations to modify MDI regimen, specifically to continue basal insulin at this time and to increase prandial insulins, as below. -- continue Lantus 18 units daily, but to inc to 28 units on dexamethasone days -- increase Novolog to 8 units before meals, to also increase to 14 units before meals on dexamethasone days -- try to limit fruit juice consumption to 4oz and not multiple times daily  FU in 2 months with Dr Vilma Mann, MS, [facility], [facility] 04/05/2024

## 2024-04-05 NOTE — PHYSICAL EXAM
[Alert] : alert [No Acute Distress] : no acute distress [Normal Voice/Communication] : normal voice communication [Normal Hearing] : hearing was normal [No Respiratory Distress] : no respiratory distress [Normal Rate and Effort] : normal respiratory rate and effort [Oriented x3] : oriented to person, place, and time [Normal Affect] : the affect was normal [Normal Insight/Judgement] : insight and judgment were intact [Normal Mood] : the mood was normal [de-identified] : Ambulates with walker--steady

## 2024-04-07 NOTE — ASSESSMENT
[FreeTextEntry1] : 74 yo F with CKD, RA, DM, HTN here for NSCLC with BMs and liver met (STK11 mutant, PIK3CA mutant, TP53 mutant, PD-L1 negative).  NSCLC - BMs s/p SRS, liver met. [place] [facility] [place] This regimen was favored over carbo/pemetrexed/pembrolizumab due to CKD and baseline GFR less than 45. She continued carboplatin/Taxol/pembrolizumab due to CKD Treatment was delayed and held several times because of uncontrolled hyperglycemia --patient very non-compliant with food restrictions Post 4 cycles induction chemoIO showing partial response with CT CAP 2/6/2024 (90% reduction in lung mass) and MRIB 2/23/2024 (s/p SRS, n/e of residual BMs) --start maintenance pembrolizumab 200 mg Q3 wks x 2 years --monitor labs: CBC, CMP, TSH each visit  CKD - current Cr 1.3 --stable  Hyperglycemia --patient was advised to go to ED today, but she is refusing. She stated she ate crab cakes, rise and a lot of carbs last night. She understands that she should go to ED with this glucose level, and polyuria, but she is refusing, understanding there is a risk of significant complications including HHS, DKA, organ failure and death. --continue to follow with endo   Rheumatoid arthritis Being managed by Dr. Johnston   RTC with next Tx with MARIBELL Vee.  3/12/24  onc clinic a/p NSCLC w/mets  Labs : glucose 358 , no anion gap, nl creatinine. wbc 14.98 no ac signs of infection, h/h : 12.5/38.6 , plts 182 Positive for hyperglycemia will give 1 liter of NS  Proceed w/ pembro C1  Cont with current meds: sliding scale for DM , endo f/u Dr. Woodard for DM control cont to check prior to tx and s/s RTC in 3 wks .Encourage to contact the office for any concerns or worsening symptoms. Pt verbalizes understanding  4/2/2024 onc clinic a/p NSCLC , hyperglycemia  Labs: glucose 368 , no gap , normal creatinine  NS 1 liter IVF bolus and insulin 5 units given  Proceed w/ pembro C2 C/w current meds, F/u with endo for better hyperglycemic control c/w checking labs and s/s prior to tx C/w to encourage to take meds regularly and diet sugar control RTC in 3wks .Encourage to contact the office for any concerns or worsening symptoms. Pt verbalizes understanding

## 2024-04-07 NOTE — HISTORY OF PRESENT ILLNESS
[Disease: _____________________] : Disease: [unfilled] [100: Normal, no complaints, no evidence of disease.] : 100: Normal, no complaints, no evidence of disease. [ECOG Performance Status: 0 - Fully active, able to carry on all pre-disease performance without restriction] : Performance Status: 0 - Fully active, able to carry on all pre-disease performance without restriction [de-identified] : Noemi Minor is a 73-year-old female with CKD, DM, RA, HTN who presents to the clinic for follow up of NSCLC with BMs.  Onc Hx: Ex-smoker with history of CKD, baseline creatinine is around 2, diabetes, rheumatoid arthritis, HTN. She was experiencing 1 week of daily falls with lower extremity weakness without dizziness, was admitted to [facility]. MRI brain showed 0.9 cm right frontal lobe mass with surrounding vasogenic edema. CT chest abdomen pelvis revealed left upper lobe mass 2.6 x 4.1 cm in posterior aspect of left upper lobe abutting major fissure.  The mass extends to adjacent left hilum and laterally to left lateral pleura.  1.5 cm left lobe of liver lesion.  1.2 cm pancreatic body cystic lesion. 7/14/2023 bronchoscopy-lingular biopsy poorly differentiated adenocarcinoma, positive TTF-1, TMB 17.3, PD-L1 negative. Tier II: Variants of Potential Clinical Significance STK11 p.([id number]) PIK3CA p.([id number]) TP53 p.([id number]) Tier III: Variants of Unknown Clinical Significance ALK p.([id number]) 8/2023: She followed up at [facility] where she received her first chemotherapy cycle, Taxol/pembrolizumab only due to carboplatin shortage. Taxol was given because her labs there showed a GFR<45, excluding her from being able to receive pemetrexed. She tolerated treatment reasonably well, without any neuropathy or cytopenias. She lost he hair after first chemotherapy cycle. 9/5/2023: PET at [facility]: Left upper lobe perihilar hypermetabolic mass consistent with biopsy-proven malignancy.  Mildly FDG avid right thyroid nodule, correlate with thyroid ultrasound. 9/7/2023: Received cycle 1 paclitaxel/pembrolizumab at [facility] (Cr 1.2 there) 10/10/2023: MRIB: Since 7/11/2023, right posterior frontal enhancing lesion and vasogenic edema are completely resolved as a favorable response to therapy. No new enhancing lesion.  11/16/23 onc clinic  74 y/o f with h/o NSCLC w/BM is here for follow up and tx. Patient reports of feeling well. No ac complaints at this time. Patient's tx was held due to hyperglycemia 478, mildly elevated Lfts that were not her baseline and hyponatremia. Patient does report of eating ice cream/ cake the night before and found eating an orange this morning.   11/22/23 onc clinic  74 y/o f with NSCLC w/BM returns to clinic for f/u and tx. Patient states last week she was sent to the ED and her hyperglycemia was medically managed. She was also tx for a UTI. She reports of feeling well and has no new complaints.   12/18/23  onc clinic  74 y/o f with NSCLC with mets. present today for glucose check. Patient reports of feeling frequency in urination. Denies fevers, n,v, abd pain, dysuria or hematuria.   2/6/24: CT CAP:Since July 11, 2023, significantly decreased left upper lobe lesion. No suspicious lymphadenopathy. No new disease in the chest abdomen or pelvis. Stable incidental findings above. 2/23/24: MRI HEAD: No evidence of abnormal enhancement to suggest residual/recurrent metastatic disease. Chronic lacunar infarctions. Small right temporal occipital chronic infarction. Tiny left cerebellar chronic infarction. Microvascular disease.  3/12/24 onc clinic 73-year-old female with CKD, DM, RA, HTN who presents to the clinic for follow up of NSCLC with BMs. Patient present states to feel well and offers no ac complaints. She states of taking her meds this morning and avoided high sugar foods  prior to visit as instructed. Patient is starting adjuvant  tx with pembrolizumab.  4/2/2024 Onc clinic  74 y/o f with uncontrolled DM, CKD, HTN, NSCLC present to clinic for follow and tx. Patient report to feel well and has no ac complaints. She admit of taking her medications regularly and has been trying to avoid high sugar foods.  [de-identified] : Adenocarcinoma [de-identified] : Medonc ([facility] [place]): Dr.Rosa Jeffrey NeuroSx: Dr.D'Amico [facility]: Dr. Wernicke [FreeTextEntry1] : 9/7/2023: [place] [facility] [place] due to [place] shortage[place] 10/26/2023: C2 carboplatin/Taxol/pembrolizumab 11/16/23 C3 carboplatin/taxol/pembro (held due to hyperglycemia, elevated lfts and hyponatremia) 11/22/23  C3 carboplatin/taxol/pembro 12/13/23 C4 held due to hyperglycemia.  1/9/2024: C4 carboplatin/Taxol/pembrolizumab  3/12/2024 C1 pembro  4/2/2024.  C2 pembro [de-identified] : Feeling ok. Offers no complaints. No irAE or any side effect to treatment last week.

## 2024-04-07 NOTE — ASSESSMENT
[FreeTextEntry1] : 74 yo F with CKD, RA, DM, HTN here for NSCLC with BMs and liver met (STK11 mutant, PIK3CA mutant, TP53 mutant, PD-L1 negative).  NSCLC - BMs s/p SRS, liver met. Received C1 paclitaxel/pembrolizumab on 9/7/2023 at [facility] [place]. This regimen was favored over carbo/pemetrexed/pembrolizumab due to CKD and baseline GFR less than 45. She continued carboplatin/Taxol/pembrolizumab due to CKD Treatment was delayed and held several times because of uncontrolled hyperglycemia --patient very non-compliant with food restrictions Post 4 cycles induction chemoIO showing partial response with CT CAP 2/6/2024 (90% reduction in lung mass) and MRIB 2/23/2024 (s/p SRS, n/e of residual BMs) --start maintenance pembrolizumab 200 mg Q3 wks x 2 years --monitor labs: CBC, CMP, TSH each visit  CKD - current Cr 1.3 --stable  Hyperglycemia --patient was advised to go to ED today, but she is refusing. She stated she ate crab cakes, rise and a lot of carbs last night. She understands that she should go to ED with this glucose level, and polyuria, but she is refusing, understanding there is a risk of significant complications including HHS, DKA, organ failure and death. --continue to follow with endo   Rheumatoid arthritis Being managed by Dr. Johnston   RTC with next Tx with MARIBELL Vee.  3/12/24  onc clinic a/p NSCLC w/mets  Labs : glucose 358 , no anion gap, nl creatinine. wbc 14.98 no ac signs of infection, h/h : 12.5/38.6 , plts 182 Positive for hyperglycemia will give 1 liter of NS  Proceed w/ pembro C1  Cont with current meds: sliding scale for DM , endo f/u Dr. Woodard for DM control cont to check prior to tx and s/s RTC in 3 wks .Encourage to contact the office for any concerns or worsening symptoms. Pt verbalizes understanding  4/2/2024 onc clinic a/p NSCLC , hyperglycemia  Labs: glucose 368 , no gap , normal creatinine  NS 1 liter IVF bolus and insulin 5 units given  Proceed w/ pembro C2 C/w current meds, F/u with endo for better hyperglycemic control c/w checking labs and s/s prior to tx C/w to encourage to take meds regularly and diet sugar control RTC in 3wks .Encourage to contact the office for any concerns or worsening symptoms. Pt verbalizes understanding

## 2024-04-07 NOTE — HISTORY OF PRESENT ILLNESS
[Disease: _____________________] : Disease: [unfilled] [100: Normal, no complaints, no evidence of disease.] : 100: Normal, no complaints, no evidence of disease. [ECOG Performance Status: 0 - Fully active, able to carry on all pre-disease performance without restriction] : Performance Status: 0 - Fully active, able to carry on all pre-disease performance without restriction [de-identified] : Noemi Minor is a 73-year-old female with CKD, DM, RA, HTN who presents to the clinic for follow up of NSCLC with BMs.  Onc Hx: Ex-smoker with history of CKD, baseline creatinine is around 2, diabetes, rheumatoid arthritis, HTN. She was experiencing 1 week of daily falls with lower extremity weakness without dizziness, was admitted to [facility]. MRI brain showed 0.9 cm right frontal lobe mass with surrounding vasogenic edema. CT chest abdomen pelvis revealed left upper lobe mass 2.6 x 4.1 cm in posterior aspect of left upper lobe abutting major fissure.  The mass extends to adjacent left hilum and laterally to left lateral pleura.  1.5 cm left lobe of liver lesion.  1.2 cm pancreatic body cystic lesion. 7/14/2023 bronchoscopy-lingular biopsy poorly differentiated adenocarcinoma, positive TTF-1, TMB 17.3, PD-L1 negative. Tier II: Variants of Potential Clinical Significance STK11 p.([id number]) PIK3CA p.([id number]) TP53 p.([id number]) Tier III: Variants of Unknown Clinical Significance ALK p.([id number]) 8/2023: [place] [facility] [place] chemotherapy cycle, Taxol/pembrolizumab only due to carboplatin shortage. Taxol was given because her labs there showed a GFR<45, excluding her from being able to receive pemetrexed. She tolerated treatment reasonably well, without any neuropathy or cytopenias. She lost he hair after first chemotherapy cycle. 9[place]5/2023: [place] [facility] [place] Left upper lobe perihilar hypermetabolic mass consistent with biopsy-proven malignancy.  Mildly FDG avid right thyroid nodule, correlate with thyroid ultrasound. 9/7/2023: Re[place] cycle 1 pac[place] [facility] [place] 1.2 there) 10/10/2023: MRIB: Since 7/11/2023, right posterior frontal enhancing lesion and vasogenic edema are completely resolved as a favorable response to therapy. No new enhancing lesion.  11/16/23 onc clinic  72 y/o f with h/o NSCLC w/BM is here for follow up and tx. Patient reports of feeling well. No ac complaints at this time. Patient's tx was held due to hyperglycemia 478, mildly elevated Lfts that were not her baseline and hyponatremia. Patient does report of eating ice cream/ cake the night before and found eating an orange this morning.   11/22/23 onc clinic  72 y/o f with NSCLC w/BM returns to clinic for f/u and tx. Patient states last week she was sent to the ED and her hyperglycemia was medically managed. She was also tx for a UTI. She reports of feeling well and has no new complaints.   12/18/23  onc clinic  72 y/o f with NSCLC with mets. present today for glucose check. Patient reports of feeling frequency in urination. Denies fevers, n,v, abd pain, dysuria or hematuria.   2/6/24: CT CAP:Since July 11, 2023, significantly decreased left upper lobe lesion. No suspicious lymphadenopathy. No new disease in the chest abdomen or pelvis. Stable incidental findings above. 2/23/24: MRI HEAD: No evidence of abnormal enhancement to suggest residual/recurrent metastatic disease. Chronic lacunar infarctions. Small right temporal occipital chronic infarction. Tiny left cerebellar chronic infarction. Microvascular disease.  3/12/24 onc clinic 73-year-old female with CKD, DM, RA, HTN who presents to the clinic for follow up of NSCLC with BMs. Patient present states to feel well and offers no ac complaints. She states of taking her meds this morning and avoided high sugar foods  prior to visit as instructed. Patient is starting adjuvant  tx with pembrolizumab.  4/2/2024 Onc clinic  72 y/o f with uncontrolled DM, CKD, HTN, NSCLC present to clinic for follow and tx. Patient report to feel well and has no ac complaints. She admit of taking her medications regularly and has been trying to avoid high sugar foods.  [de-identified] : Adenocarcinoma [de-identified] : Medonc ([facility] [place]): Dr.Rosa Jeffrey NeuroSx: Dr.[facility]: Dr. Wernicke [FreeTextEntry1] : 9/7/2023: Taxol/pembrolizumab C1 given at [facility] [place] (no carboplatin due to national shortage) 10/26/2023: C2 carboplatin/Taxol/pembrolizumab 11/16/23 C3 carboplatin/taxol/pembro (held due to hyperglycemia, elevated lfts and hyponatremia) 11/22/23  C3 carboplatin/taxol/pembro 12/13/23 C4 held due to hyperglycemia.  1/9/2024: C4 carboplatin/Taxol/pembrolizumab  3/12/2024 C1 pembro  4/2/2024.  C2 pembro [de-identified] : Feeling ok. Offers no complaints. No irAE or any side effect to treatment last week.

## 2024-04-11 NOTE — PHYSICAL THERAPY INITIAL EVALUATION ADULT - FOLLOWS COMMANDS/ANSWERS QUESTIONS, REHAB EVAL
Detail Level: Zone Detail Level: Detailed Detail Level: Simple Detail Level: Generalized baseline dysarthria/100% of the time

## 2024-04-23 ENCOUNTER — EMERGENCY (EMERGENCY)
Facility: HOSPITAL | Age: 74
LOS: 1 days | Discharge: HOME | End: 2024-04-23
Attending: EMERGENCY MEDICINE | Admitting: EMERGENCY MEDICINE
Payer: MEDICARE

## 2024-04-23 ENCOUNTER — APPOINTMENT (OUTPATIENT)
Dept: HEMATOLOGY ONCOLOGY | Facility: CLINIC | Age: 74
End: 2024-04-23

## 2024-04-23 ENCOUNTER — NON-APPOINTMENT (OUTPATIENT)
Age: 74
End: 2024-04-23

## 2024-04-23 VITALS
HEART RATE: 71 BPM | DIASTOLIC BLOOD PRESSURE: 96 MMHG | HEIGHT: 63 IN | WEIGHT: 134.92 LBS | TEMPERATURE: 98 F | OXYGEN SATURATION: 96 % | RESPIRATION RATE: 16 BRPM | SYSTOLIC BLOOD PRESSURE: 183 MMHG

## 2024-04-23 VITALS
TEMPERATURE: 98 F | DIASTOLIC BLOOD PRESSURE: 77 MMHG | OXYGEN SATURATION: 99 % | SYSTOLIC BLOOD PRESSURE: 173 MMHG | RESPIRATION RATE: 16 BRPM | HEART RATE: 71 BPM

## 2024-04-23 DIAGNOSIS — Z96.641 PRESENCE OF RIGHT ARTIFICIAL HIP JOINT: Chronic | ICD-10-CM

## 2024-04-23 DIAGNOSIS — Z88.1 ALLERGY STATUS TO OTHER ANTIBIOTIC AGENTS: ICD-10-CM

## 2024-04-23 DIAGNOSIS — N18.31 CHRONIC KIDNEY DISEASE, STAGE 3A: ICD-10-CM

## 2024-04-23 DIAGNOSIS — Z91.148 PATIENT'S OTHER NONCOMPLIANCE WITH MEDICATION REGIMEN FOR OTHER REASON: ICD-10-CM

## 2024-04-23 DIAGNOSIS — Z91.018 ALLERGY TO OTHER FOODS: ICD-10-CM

## 2024-04-23 DIAGNOSIS — E11.65 TYPE 2 DIABETES MELLITUS WITH HYPERGLYCEMIA: ICD-10-CM

## 2024-04-23 DIAGNOSIS — E78.5 HYPERLIPIDEMIA, UNSPECIFIED: ICD-10-CM

## 2024-04-23 DIAGNOSIS — I44.4 LEFT ANTERIOR FASCICULAR BLOCK: ICD-10-CM

## 2024-04-23 DIAGNOSIS — I49.8 OTHER SPECIFIED CARDIAC ARRHYTHMIAS: ICD-10-CM

## 2024-04-23 DIAGNOSIS — I13.10 HYPERTENSIVE HEART AND CHRONIC KIDNEY DISEASE WITHOUT HEART FAILURE, WITH STAGE 1 THROUGH STAGE 4 CHRONIC KIDNEY DISEASE, OR UNSPECIFIED CHRONIC KIDNEY DISEASE: ICD-10-CM

## 2024-04-23 DIAGNOSIS — T38.3X6A UNDERDOSING OF INSULIN AND ORAL HYPOGLYCEMIC [ANTIDIABETIC] DRUGS, INITIAL ENCOUNTER: ICD-10-CM

## 2024-04-23 DIAGNOSIS — C79.31 SECONDARY MALIGNANT NEOPLASM OF BRAIN: ICD-10-CM

## 2024-04-23 DIAGNOSIS — E11.22 TYPE 2 DIABETES MELLITUS WITH DIABETIC CHRONIC KIDNEY DISEASE: ICD-10-CM

## 2024-04-23 DIAGNOSIS — C34.90 MALIGNANT NEOPLASM OF UNSPECIFIED PART OF UNSPECIFIED BRONCHUS OR LUNG: ICD-10-CM

## 2024-04-23 DIAGNOSIS — Z79.4 LONG TERM (CURRENT) USE OF INSULIN: ICD-10-CM

## 2024-04-23 LAB
ALBUMIN SERPL ELPH-MCNC: 4.1 G/DL — SIGNIFICANT CHANGE UP (ref 3.3–5)
ALP SERPL-CCNC: 218 U/L — HIGH (ref 40–120)
ALT FLD-CCNC: 10 U/L — SIGNIFICANT CHANGE UP (ref 10–45)
ANION GAP SERPL CALC-SCNC: 9 MMOL/L — SIGNIFICANT CHANGE UP (ref 5–17)
AST SERPL-CCNC: 14 U/L — SIGNIFICANT CHANGE UP (ref 10–40)
B-OH-BUTYR SERPL-SCNC: 0.2 MMOL/L — SIGNIFICANT CHANGE UP
BASE EXCESS BLDV CALC-SCNC: 6.5 MMOL/L — HIGH (ref -2–3)
BASOPHILS # BLD AUTO: 0.05 K/UL — SIGNIFICANT CHANGE UP (ref 0–0.2)
BASOPHILS NFR BLD AUTO: 0.7 % — SIGNIFICANT CHANGE UP (ref 0–2)
BILIRUB SERPL-MCNC: 0.4 MG/DL — SIGNIFICANT CHANGE UP (ref 0.2–1.2)
BUN SERPL-MCNC: 21 MG/DL — SIGNIFICANT CHANGE UP (ref 7–23)
CA-I SERPL-SCNC: 1.29 MMOL/L — SIGNIFICANT CHANGE UP (ref 1.15–1.33)
CALCIUM SERPL-MCNC: 10.4 MG/DL — SIGNIFICANT CHANGE UP (ref 8.4–10.5)
CHLORIDE SERPL-SCNC: 93 MMOL/L — LOW (ref 96–108)
CO2 BLDV-SCNC: 35.7 MMOL/L — HIGH (ref 22–26)
CO2 SERPL-SCNC: 31 MMOL/L — SIGNIFICANT CHANGE UP (ref 22–31)
CREAT SERPL-MCNC: 1.28 MG/DL — SIGNIFICANT CHANGE UP (ref 0.5–1.3)
EGFR: 44 ML/MIN/1.73M2 — LOW
EOSINOPHIL # BLD AUTO: 0.09 K/UL — SIGNIFICANT CHANGE UP (ref 0–0.5)
EOSINOPHIL NFR BLD AUTO: 1.3 % — SIGNIFICANT CHANGE UP (ref 0–6)
GAS PNL BLDV: 134 MMOL/L — LOW (ref 136–145)
GAS PNL BLDV: SIGNIFICANT CHANGE UP
GLUCOSE SERPL-MCNC: 529 MG/DL — CRITICAL HIGH (ref 70–99)
HCO3 BLDV-SCNC: 34 MMOL/L — HIGH (ref 22–29)
HCT VFR BLD CALC: 39.2 % — SIGNIFICANT CHANGE UP (ref 34.5–45)
HGB BLD-MCNC: 12.9 G/DL — SIGNIFICANT CHANGE UP (ref 11.5–15.5)
IMM GRANULOCYTES NFR BLD AUTO: 0.9 % — SIGNIFICANT CHANGE UP (ref 0–0.9)
LYMPHOCYTES # BLD AUTO: 1.16 K/UL — SIGNIFICANT CHANGE UP (ref 1–3.3)
LYMPHOCYTES # BLD AUTO: 16.9 % — SIGNIFICANT CHANGE UP (ref 13–44)
MCHC RBC-ENTMCNC: 29.2 PG — SIGNIFICANT CHANGE UP (ref 27–34)
MCHC RBC-ENTMCNC: 32.9 GM/DL — SIGNIFICANT CHANGE UP (ref 32–36)
MCV RBC AUTO: 88.7 FL — SIGNIFICANT CHANGE UP (ref 80–100)
MONOCYTES # BLD AUTO: 0.49 K/UL — SIGNIFICANT CHANGE UP (ref 0–0.9)
MONOCYTES NFR BLD AUTO: 7.1 % — SIGNIFICANT CHANGE UP (ref 2–14)
NEUTROPHILS # BLD AUTO: 5.01 K/UL — SIGNIFICANT CHANGE UP (ref 1.8–7.4)
NEUTROPHILS NFR BLD AUTO: 73.1 % — SIGNIFICANT CHANGE UP (ref 43–77)
NRBC # BLD: 0 /100 WBCS — SIGNIFICANT CHANGE UP (ref 0–0)
PCO2 BLDV: 60 MMHG — HIGH (ref 39–42)
PH BLDV: 7.36 — SIGNIFICANT CHANGE UP (ref 7.32–7.43)
PLATELET # BLD AUTO: 176 K/UL — SIGNIFICANT CHANGE UP (ref 150–400)
PO2 BLDV: <33 MMHG — LOW (ref 25–45)
POTASSIUM BLDV-SCNC: 4.2 MMOL/L — SIGNIFICANT CHANGE UP (ref 3.5–5.1)
POTASSIUM SERPL-MCNC: 4.2 MMOL/L — SIGNIFICANT CHANGE UP (ref 3.5–5.3)
POTASSIUM SERPL-SCNC: 4.2 MMOL/L — SIGNIFICANT CHANGE UP (ref 3.5–5.3)
PROT SERPL-MCNC: 7.7 G/DL — SIGNIFICANT CHANGE UP (ref 6–8.3)
RBC # BLD: 4.42 M/UL — SIGNIFICANT CHANGE UP (ref 3.8–5.2)
RBC # FLD: 13.2 % — SIGNIFICANT CHANGE UP (ref 10.3–14.5)
SAO2 % BLDV: 30 % — LOW (ref 67–88)
SODIUM SERPL-SCNC: 133 MMOL/L — LOW (ref 135–145)
WBC # BLD: 6.86 K/UL — SIGNIFICANT CHANGE UP (ref 3.8–10.5)
WBC # FLD AUTO: 6.86 K/UL — SIGNIFICANT CHANGE UP (ref 3.8–10.5)

## 2024-04-23 PROCEDURE — 82330 ASSAY OF CALCIUM: CPT

## 2024-04-23 PROCEDURE — 85025 COMPLETE CBC W/AUTO DIFF WBC: CPT

## 2024-04-23 PROCEDURE — 99285 EMERGENCY DEPT VISIT HI MDM: CPT

## 2024-04-23 PROCEDURE — 80053 COMPREHEN METABOLIC PANEL: CPT

## 2024-04-23 PROCEDURE — 36415 COLL VENOUS BLD VENIPUNCTURE: CPT

## 2024-04-23 PROCEDURE — 84132 ASSAY OF SERUM POTASSIUM: CPT

## 2024-04-23 PROCEDURE — 96374 THER/PROPH/DIAG INJ IV PUSH: CPT

## 2024-04-23 PROCEDURE — 82010 KETONE BODYS QUAN: CPT

## 2024-04-23 PROCEDURE — 71045 X-RAY EXAM CHEST 1 VIEW: CPT | Mod: 26

## 2024-04-23 PROCEDURE — 93010 ELECTROCARDIOGRAM REPORT: CPT

## 2024-04-23 PROCEDURE — 71045 X-RAY EXAM CHEST 1 VIEW: CPT

## 2024-04-23 PROCEDURE — 93005 ELECTROCARDIOGRAM TRACING: CPT

## 2024-04-23 PROCEDURE — 99285 EMERGENCY DEPT VISIT HI MDM: CPT | Mod: 25

## 2024-04-23 PROCEDURE — 82962 GLUCOSE BLOOD TEST: CPT

## 2024-04-23 PROCEDURE — 84295 ASSAY OF SERUM SODIUM: CPT

## 2024-04-23 PROCEDURE — 82803 BLOOD GASES ANY COMBINATION: CPT

## 2024-04-23 RX ORDER — INSULIN HUMAN 100 [IU]/ML
8 INJECTION, SOLUTION SUBCUTANEOUS ONCE
Refills: 0 | Status: COMPLETED | OUTPATIENT
Start: 2024-04-23 | End: 2024-04-23

## 2024-04-23 RX ORDER — SODIUM CHLORIDE 9 MG/ML
1000 INJECTION INTRAMUSCULAR; INTRAVENOUS; SUBCUTANEOUS ONCE
Refills: 0 | Status: COMPLETED | OUTPATIENT
Start: 2024-04-23 | End: 2024-04-23

## 2024-04-23 RX ORDER — INSULIN HUMAN 100 [IU]/ML
10 INJECTION, SOLUTION SUBCUTANEOUS ONCE
Refills: 0 | Status: COMPLETED | OUTPATIENT
Start: 2024-04-23 | End: 2024-04-23

## 2024-04-23 RX ADMIN — INSULIN HUMAN 10 UNIT(S): 100 INJECTION, SOLUTION SUBCUTANEOUS at 11:07

## 2024-04-23 RX ADMIN — SODIUM CHLORIDE 2000 MILLILITER(S): 9 INJECTION INTRAMUSCULAR; INTRAVENOUS; SUBCUTANEOUS at 10:28

## 2024-04-23 RX ADMIN — SODIUM CHLORIDE 1000 MILLILITER(S): 9 INJECTION INTRAMUSCULAR; INTRAVENOUS; SUBCUTANEOUS at 11:32

## 2024-04-23 RX ADMIN — INSULIN HUMAN 8 UNIT(S): 100 INJECTION, SOLUTION SUBCUTANEOUS at 12:34

## 2024-04-23 NOTE — ED ADULT TRIAGE NOTE - CHIEF COMPLAINT QUOTE
Pt presents to ED C/O hyperglycemia reading at home. " I woke up at 6AM and I realized I hadn't taken my insulin my glucometer read 508 at home". Pt denies symptoms. Hx lung CA. States, " I go for chemo every three weeks".

## 2024-04-23 NOTE — ED ADULT NURSE NOTE - OBJECTIVE STATEMENT
74 yo F PMHx lung ca, HTN, HLD, DM2, RA presents to the ED co hyperglycemia. Pt awake and alert, AOx4. Pt reports she gets chemotherapy every 3 weeks and was due to go to chemotherapy today. pt reports her oncologist asked her to check her BG this morning before chemotherapy and it was 508 at home. Pt reports she forgot to take her insulin last night. Pt reports she usually takes 18 units of Lantus at night. Pt denies any sx. Pt denies CP, SOB, abdominal pain, N/V/D, f/c, lightheadedness, ha, dizziness, numbness, tingling, vision changes, urinary sx. Pt reports her oncologist referred her to the ED. Pt walks with walker at baseline. Pt presents with unaccessed R sided chest port.

## 2024-04-23 NOTE — ED PROVIDER NOTE - CLINICAL SUMMARY MEDICAL DECISION MAKING FREE TEXT BOX
74 yo F with PMH DM (on insulin), NSCLC adenocarcinoma withe mets to brain, s/p XRT, last chemo 3 weeks ago, R hip replacement, RA, CKD IIIA, HTN and HLD presents today with elevated BG at home. Notes that her FSBG was 508 at home this morning and notes that she forgot to take her Insulin last night. Usually takes 18 units Lantus at night.  She denies fever, chills, urinary sxs, flan pain, abd pain, N,V, constipation/diarrhea, HA, CP, SOB or dizziness at this time. No complaints. States that she spoke to her oncologist (Dr. Moncada) and was asked to come to the ED for further evaluation.    ED course: Vital signs noted.  Patient afebrile.  BP 180s/90s and hypertensive.  Rest of vital signs are within normal limits.  BG in ED is 528.  Hyperglycemic workup initiated. 74 yo F with PMH DM (on insulin), NSCLC adenocarcinoma withe mets to brain, s/p XRT, last chemo 3 weeks ago, R hip replacement, RA, CKD IIIA, HTN and HLD presents today with elevated BG at home. Notes that her FSBG was 508 at home this morning and notes that she forgot to take her Insulin last night. Usually takes 18 units Lantus at night.  She denies fever, chills, urinary sxs, flan pain, abd pain, N,V, constipation/diarrhea, HA, CP, SOB or dizziness at this time. No complaints. States that she spoke to her oncologist (Dr. Moncada) and was asked to come to the ED for further evaluation.    ED course: Vital signs noted.  Patient afebrile.  Hypertensive with BP 180s/90s.  Rest of vital signs are within normal limits. BG in ED is 528.  Hyperglycemic workup initiated. 72 yo F with PMH DM (on insulin), NSCLC adenocarcinoma withe mets to brain, s/p XRT, last chemo 3 weeks ago, R hip replacement, RA, CKD IIIA, HTN and HLD presents today with elevated BG at home. Notes that her FSBG was 508 at home this morning and notes that she forgot to take her Insulin last night. Usually takes 18 units Lantus at night.  She denies fever, chills, urinary sxs, flan pain, abd pain, N,V, constipation/diarrhea, HA, CP, SOB or dizziness at this time. No complaints. States that she spoke to her oncologist (Dr. Moncada) and was asked to come to the ED for further evaluation.    ED course: Vital signs noted.  Patient afebrile.  Hypertensive with BP 180s/90s.  Rest of vital signs are within normal limits. BG in ED is 526.  Hyperglycemic workup initiated. 72 yo F with PMH DM (on insulin), NSCLC adenocarcinoma withe mets to brain, s/p XRT, last chemo 3 weeks ago, R hip replacement, RA, CKD IIIA, HTN and HLD presents today with elevated BG at home. Notes that her FSBG was 508 at home this morning and notes that she forgot to take her Insulin last night. Usually takes 18 units Lantus at night.  She denies fever, chills, urinary sxs, flan pain, abd pain, N,V, constipation/diarrhea, HA, CP, SOB or dizziness at this time. No complaints. States that she spoke to her oncologist (Dr. Moncada) and was asked to come to the ED for further evaluation.    ED course: Vital signs noted.  Patient afebrile.  Hypertensive with BP 180s/90s. Pt with hx of HTN with no CP/SOB/HA. ECG with NAD. CXR with NAD.  Rest of vital signs are within normal limits. BG in ED is 526. Labs noted and patient with BG in 500s with no anion gap. Ph normal on VBG. Pt is not in DKA. Given IVF and SQ and IV insulin with BG improving to 200s. Message left with pt's oncologist. Patient advised to take Insulin as prescribed and not to skip any doses. Non-toxic appearing and stable for discharge. To follow up outpatient. Strict return precautions given.

## 2024-04-23 NOTE — ED PROVIDER NOTE - CPE EDP [facility] NORM
Freddy Menendez is a 54 year old patient who comes in today for a Blood Pressure check.  Initial BP:  /68  Pulse 56     56  Disposition: follow-up as previously indicated by provider    
normal...

## 2024-04-23 NOTE — ED ADULT NURSE NOTE - NSFALLRISKINTERV_ED_ALL_ED

## 2024-04-23 NOTE — ED ADULT NURSE NOTE - NURSING ED SKIN COLOR
Pt instructed on Bowel/Bladder Prep for MRI 10/26/19 @ 1500, SIM 10/29/19 @ 0915 and Daily treatments. He stated he moves his bowels \"fairly regularly\" and will be able to void and hold 16 OZ water 1 hour prior to all appts. Understanding stated.     normal for race

## 2024-04-23 NOTE — ED PROVIDER NOTE - OBJECTIVE STATEMENT
74 yo F with PMH DM (on insulin), NSCLC adenocarcinoma withe mets to brain, s/p XRT, last chemo 3 weeks ago, R hip replacement, RA, CKD IIIA, HTN and HLD presents today with elevated BG at home. Notes that her FSBG was 508 at home this morning and notes that she forgot to take her Insulin last night. Usually takes 18 units Lantus at night.  She denies fever, chills, urinary sxs, flan pain, abd pain, N,V, constipation/diarrhea, HA, CP, SOB or dizziness at this time. No complaints. States that she spoke to her oncologist (Dr. Moncada) and was asked to come to the ED for further evaluation.

## 2024-04-23 NOTE — ED PROVIDER NOTE - NSFOLLOWUPINSTRUCTIONS_ED_ALL_ED_FT
Please take your insulin as prescribed and do not miss any doses.  Please follow-up with your primary care doctor in 2 to 3 days.  Return to the Emergency Department if you develop any concerning symptoms.    Hyperglycemia    Hyperglycemia occurs when the glucose (sugar) level in your blood is too high. Symptoms include increased urination, increased thirst, a dry mouth, or changes in appetite. If started on a medication, take exactly as prescribed by your health care professional. Maintain a healthy lifestyle and follow up with your primary care physician.    SEEK IMMEDIATE MEDICAL CARE IF YOU HAVE ANY OF THE FOLLOWING SYMPTOMS: shortness of breath, change in mental status, nausea or vomiting, fruity smell to your breath, or any signs of dehydration.    Hypertension    Hypertension, commonly called high blood pressure, is when the force of blood pumping through your arteries is too strong. Hypertension forces your heart to work harder to pump blood. Your arteries may become narrow or stiff. Having untreated or uncontrolled hypertension for a long period of time can cause heart attack, stroke, kidney disease, and other problems. If started on a medication, take exactly as prescribed by your health care professional. Maintain a healthy lifestyle and follow up with your primary care physician.    SEEK IMMEDIATE MEDICAL CARE IF YOU HAVE ANY OF THE FOLLOWING SYMPTOMS: severe headache, confusion, chest pain, abdominal pain, vomiting, or shortness of breath.

## 2024-04-23 NOTE — ED ADULT NURSE REASSESSMENT NOTE - NS ED NURSE REASSESS COMMENT FT1
RN provided and reviewed DC papers with pt. Pt verbalized understanding and reported no further questions. IV removed. Pt ambulated out of ED using her walker with a steady gait.

## 2024-04-24 ENCOUNTER — APPOINTMENT (OUTPATIENT)
Dept: HEMATOLOGY ONCOLOGY | Facility: CLINIC | Age: 74
End: 2024-04-24

## 2024-04-29 ENCOUNTER — APPOINTMENT (OUTPATIENT)
Dept: HOME HEALTH SERVICES | Facility: HOME HEALTH | Age: 74
End: 2024-04-29
Payer: MEDICARE

## 2024-04-29 VITALS
HEIGHT: 63 IN | RESPIRATION RATE: 20 BRPM | TEMPERATURE: 98.3 F | HEART RATE: 65 BPM | SYSTOLIC BLOOD PRESSURE: 138 MMHG | OXYGEN SATURATION: 97 % | DIASTOLIC BLOOD PRESSURE: 80 MMHG

## 2024-04-29 DIAGNOSIS — Z87.42 PERSONAL HISTORY OF OTHER DISEASES OF THE FEMALE GENITAL TRACT: ICD-10-CM

## 2024-04-29 DIAGNOSIS — Z87.2 PERSONAL HISTORY OF DISEASES OF THE SKIN AND SUBCUTANEOUS TISSUE: ICD-10-CM

## 2024-04-29 DIAGNOSIS — R79.89 OTHER SPECIFIED ABNORMAL FINDINGS OF BLOOD CHEMISTRY: ICD-10-CM

## 2024-04-29 DIAGNOSIS — Z86.39 PERSONAL HISTORY OF OTHER ENDOCRINE, NUTRITIONAL AND METABOLIC DISEASE: ICD-10-CM

## 2024-04-29 PROCEDURE — 99349 HOME/RES VST EST MOD MDM 40: CPT

## 2024-04-29 NOTE — REASON FOR VISIT
[Acute] : an acute visit [Pre-Visit Preparation] : pre-visit preparation was done [Intercurrent Specialty/Sub-specialty Visits] : the patient has intercurrent specialty/sub-specialty visits [FreeTextEntry1] : Post ER visit [FreeTextEntry2] : reviewed chart [FreeTextEntry3] : oncology, cardiology,, endocrinology, orthopedic, rheumatology,

## 2024-04-29 NOTE — REVIEW OF SYSTEMS
[Joint Pain] : joint pain [Joint Stiffness] : no joint stiffness [Joint Swelling] : no joint swelling [Muscle Weakness] : no muscle weakness [Muscle Pain] : no muscle pain [Back Pain] : no back pain [Headache] : no headache [Dizziness] : no dizziness [Fainting] : no fainting [Confusion] : no confusion [Memory Loss] : no memory loss [Unsteady Walking] : ataxia [Negative] : Heme/Lymph

## 2024-04-29 NOTE — HISTORY OF PRESENT ILLNESS
[House Calls Co-Management Patient] : [unfilled] is a House Calls co-management patient [PT] : PT [Patient is stable] : patient is stable [In-Place] : has aide services in-place [Education provided] : education provided [LastPCPVisitDate] : 03/24 [FreeTextEntry4] : ONCOLOGY, CARDIOLOGY, RHEUMATOLOGY [Patient] : patient [FreeTextEntry1] : Not homebound [FreeTextEntry2] : 04/29/2024 COVID SCREEN: Patient or caretaker denies fever, cough, trouble breathing, rash, vomiting. Patient has not been in close contact with anyone who is COVID-19 positive, or suspected of having COVID-19.  N95 mask, gloves, eye wear and gown (if indicated) used during visit: Yes.  Total face to face time with patient is 45 min.  MICHAEL READ is an 73 year with PMHx DM2 on insulin, Right hip joint replacement, herniated disc, HLD, HTN, Lung CA metastatic to brain on chemo therapy,  Osteoarthritis, obesity, CKD stage 3, Osteopenia, osteoporosis, Rheumatoid arthritis, venous insufficiency, osteoporosis seen today for acute post ED home visit    Patient's last ED visit was on  04/23/24 at [facility] for fingerstick glucose 508. Went to ED to rule out DKA, and for hyperglycemia management. Patient was sent home same day.   Medication reconciled with no changes.   Since hospitalization patient/ patient's caregiver reports FS at baseline and taking insulin as ordered. Denies pain, discomfort, distress, dizziness, headache and n/v.     Patient/ patient's caregiver reports no weight loss >10 lbs in the past 6 months. No changes in dentition or swallowing reported, No changes in hearing or vision reported. No changes in Cognition reported. Patient denies any symptoms of depression or anxiety. Patient is ADL independent/dependent and IADL independent/dependent. No changes in gait. Multiple falls reported in 2months ago.  Patient's home environment is safe.   Goals of care discussed 10min. Full Code with no limitations

## 2024-04-29 NOTE — COUNSELING
[] : foot exam [Completed] : Aspirin use discussion completed [Full Code] : Code Status: Full Code [No Limitations] : Treatment Guidelines: No limitations [Long Term Intubation] : Intubation: Long term intubation [Overweight - ( BMI 25.1 - 29.9 )] : overweight -  ( BMI 25.1 - 29.9 ) [DASH diet recommended] : DASH diet recommended [Sodium restriction 2gm recommended] : sodium restriction 2 gm recommended [Non - Smoker] : non-smoker [Smoke/CO Detectors] : smoke/CO detectors [Use grab bars] : use grab bars [Use assistive device to avoid falls] : use assistive device to avoid falls [Remove clutter and unsafe carpeting to avoid falls] : remove clutter and unsafe carpeting to avoid falls [Patient not on disease-modifying anti-rheumatic drug due to overall prognosis] : Patient not on disease-modifying anti-rheumatic drug due to overall prognosis [Improve mobility] : improve mobility [Decrease hospital use] : decrease hospital use [Minimize unnecessary interventions] : minimize unnecessary interventions [Discussed disease trajectory with patient/caregiver] : discussed disease trajectory with patient/caregiver [Advanced Directives discussed: ____] : Advanced directives discussed: [unfilled] [Annual Discussion and review of: ___] : Annual discussion and review of [unfilled]

## 2024-04-29 NOTE — HEALTH RISK ASSESSMENT
[Independent] : managing finances [Some assistance needed] : doing laundry [Two or more falls in past year] : Patient reported two or more falls in the past year [Yes] : The patient does have visual impairment [HRA Reviewed] : Health risk assessment reviewed [FreeTextEntry8] : unsteady gait, use rollator outside the house [TimeGetUpGo] : 6 [de-identified] : use rollator outside house, unsteady gait

## 2024-04-29 NOTE — PHYSICAL EXAM
[No Acute Distress] : no acute distress [Normal Voice/Communication] : normal voice communication [Normal Sclera/Conjunctiva] : normal sclera/conjunctiva [EOMI] : extra ocular movement intact [Normal Outer Ear/Nose] : the ears and nose were normal in appearance [Normal TMs] : both tympanic membranes were normal [No JVD] : no jugular venous distention [No LAD] : no lymphadenopathy [No Respiratory Distress] : no respiratory distress [Clear to Auscultation] : lungs were clear to auscultation bilaterally [No Accessory Muscle Use] : no accessory muscle use [Normal Rate] : heart rate was normal  [Regular Rhythm] : with a regular rhythm [Normal S1, S2] : normal S1 and S2 [No Murmurs] : no murmurs heard [No Edema] : there was no peripheral edema [Normal Bowel Sounds] : normal bowel sounds [Non Tender] : non-tender [Soft] : abdomen soft [Not Distended] : not distended [Patient Refused] : rectal exam was refused by the patient [Normal Post Cervical Nodes] : no posterior cervical lymphadenopathy [Normal Anterior Cervical Nodes] : no anterior cervical lymphadenopathy [No CVA Tenderness] : no ~M costovertebral angle tenderness [No Joint Swelling] : no joint swelling seen [No Rash] : no rash [No Skin Lesions] : no skin lesions [Cranial Nerves Intact] : cranial nerves 2-12 were intact [Oriented x3] : oriented to person, place, and time [Over the Past 2 Weeks, Have You Felt Down, Depressed, or Hopeless?] : 1.) Over the past 2 weeks, have you felt down, depressed, or hopeless? No [Over the Past 2 Weeks, Have You Felt Little Interest or Pleasure Doing Things?] : 2.) Over the past 2 weeks, have you felt little interest or pleasure doing things? No [Foot Ulcers] : no foot ulcers [de-identified] : patient refused [de-identified] : unsteady gait

## 2024-04-29 NOTE — CHRONIC CARE ASSESSMENT
[General Adherence] : and is generally adherent [Inadequate social support] : inadequate social support [Limited decision making ability] : limited decision making ability [Less than 3 Times Per Week] : exercises less than 3 times per week [> 30 Minutes/Session] : (> 30 minutes per session) [Walking] : walking [Diabetic Diet] : diabetic [Low Fat Diet] : low fat [Low Carb Diet] : low carbohydrate [Low Salt Diet] : low salt [de-identified] : Lung CA metastatic to brain and on chemo therapy [de-identified] : walks as tolerated [de-identified] : low sodium, low fat, low carb, diabetic diet [PPS Score: ____] : Palliative Performance Scale (PPS) Score: [unfilled]

## 2024-05-01 ENCOUNTER — OUTPATIENT (OUTPATIENT)
Dept: OUTPATIENT SERVICES | Facility: HOSPITAL | Age: 74
LOS: 1 days | End: 2024-05-01
Payer: MEDICARE

## 2024-05-01 ENCOUNTER — LABORATORY RESULT (OUTPATIENT)
Age: 74
End: 2024-05-01

## 2024-05-01 ENCOUNTER — APPOINTMENT (OUTPATIENT)
Dept: HEMATOLOGY ONCOLOGY | Facility: CLINIC | Age: 74
End: 2024-05-01
Payer: MEDICARE

## 2024-05-01 ENCOUNTER — APPOINTMENT (OUTPATIENT)
Dept: INFUSION THERAPY | Facility: CLINIC | Age: 74
End: 2024-05-01

## 2024-05-01 VITALS
RESPIRATION RATE: 18 BRPM | SYSTOLIC BLOOD PRESSURE: 122 MMHG | WEIGHT: 130.95 LBS | TEMPERATURE: 98 F | OXYGEN SATURATION: 98 % | HEART RATE: 70 BPM | DIASTOLIC BLOOD PRESSURE: 69 MMHG | HEIGHT: 63 IN

## 2024-05-01 VITALS
WEIGHT: 131 LBS | DIASTOLIC BLOOD PRESSURE: 69 MMHG | RESPIRATION RATE: 18 BRPM | HEART RATE: 70 BPM | TEMPERATURE: 97.9 F | OXYGEN SATURATION: 98 % | BODY MASS INDEX: 23.21 KG/M2 | HEIGHT: 63 IN | SYSTOLIC BLOOD PRESSURE: 122 MMHG

## 2024-05-01 VITALS
TEMPERATURE: 98 F | HEART RATE: 68 BPM | DIASTOLIC BLOOD PRESSURE: 78 MMHG | RESPIRATION RATE: 18 BRPM | OXYGEN SATURATION: 99 % | SYSTOLIC BLOOD PRESSURE: 114 MMHG

## 2024-05-01 DIAGNOSIS — Z96.641 PRESENCE OF RIGHT ARTIFICIAL HIP JOINT: Chronic | ICD-10-CM

## 2024-05-01 DIAGNOSIS — C34.90 MALIGNANT NEOPLASM OF UNSPECIFIED PART OF UNSPECIFIED BRONCHUS OR LUNG: ICD-10-CM

## 2024-05-01 LAB
ALBUMIN SERPL ELPH-MCNC: 3.8 G/DL
ALP BLD-CCNC: 104 U/L
ALT SERPL-CCNC: 10 U/L
ANION GAP SERPL CALC-SCNC: 9 MMOL/L
AST SERPL-CCNC: 21 U/L
BILIRUB SERPL-MCNC: 0.8 MG/DL
BUN SERPL-MCNC: 22 MG/DL
CALCIUM SERPL-MCNC: 10.2 MG/DL
CHLORIDE SERPL-SCNC: 94 MMOL/L
CO2 SERPL-SCNC: 28 MMOL/L
CREAT SERPL-MCNC: 1.3 MG/DL
EGFR: 43 ML/MIN/1.73M2
GLUCOSE SERPL-MCNC: 395 MG/DL
MAGNESIUM SERPL-MCNC: 1.6 MG/DL
POTASSIUM SERPL-SCNC: 4.5 MMOL/L
PROT SERPL-MCNC: 7.3 G/DL
SODIUM SERPL-SCNC: 131 MMOL/L

## 2024-05-01 PROCEDURE — 96413 CHEMO IV INFUSION 1 HR: CPT

## 2024-05-01 PROCEDURE — 96372 THER/PROPH/DIAG INJ SC/IM: CPT

## 2024-05-01 PROCEDURE — G2211 COMPLEX E/M VISIT ADD ON: CPT | Mod: NC,1L

## 2024-05-01 PROCEDURE — 99214 OFFICE O/P EST MOD 30 MIN: CPT

## 2024-05-01 RX ORDER — SODIUM CHLORIDE 9 MG/ML
1000 INJECTION INTRAMUSCULAR; INTRAVENOUS; SUBCUTANEOUS ONCE
Refills: 0 | Status: COMPLETED | OUTPATIENT
Start: 2024-05-01 | End: 2024-05-01

## 2024-05-01 RX ORDER — PEMBROLIZUMAB 25 MG/ML
200 INJECTION, SOLUTION INTRAVENOUS ONCE
Refills: 0 | Status: COMPLETED | OUTPATIENT
Start: 2024-05-01 | End: 2024-05-01

## 2024-05-01 RX ORDER — INSULIN LISPRO 100/ML
5 VIAL (ML) SUBCUTANEOUS ONCE
Refills: 0 | Status: COMPLETED | OUTPATIENT
Start: 2024-05-01 | End: 2024-05-01

## 2024-05-01 RX ADMIN — SODIUM CHLORIDE 1000 MILLILITER(S): 9 INJECTION INTRAMUSCULAR; INTRAVENOUS; SUBCUTANEOUS at 16:00

## 2024-05-01 RX ADMIN — Medication 5 UNIT(S): at 16:14

## 2024-05-01 RX ADMIN — PEMBROLIZUMAB 200 MILLIGRAM(S): 25 INJECTION, SOLUTION INTRAVENOUS at 17:00

## 2024-05-01 RX ADMIN — PEMBROLIZUMAB 200 MILLIGRAM(S): 25 INJECTION, SOLUTION INTRAVENOUS at 17:30

## 2024-05-01 RX ADMIN — SODIUM CHLORIDE 1000 MILLILITER(S): 9 INJECTION INTRAMUSCULAR; INTRAVENOUS; SUBCUTANEOUS at 17:00

## 2024-05-01 NOTE — ASSESSMENT
[FreeTextEntry1] : 72 yo F with CKD, RA, DM, HTN here for NSCLC with BMs and liver met (STK11 mutant, PIK3CA mutant, TP53 mutant, PD-L1 negative).  NSCLC - BMs s/p SRS, liver met. Received C1 paclitaxel/pembrolizumab on 9/7/2023 at [facility] [place]. This regimen was favored over carbo/pemetrexed/pembrolizumab due to CKD and baseline GFR less than 45. She continued carboplatin/Taxol/pembrolizumab due to CKD Treatment was delayed and held several times because of uncontrolled hyperglycemia --patient very non-compliant with food restrictions Post 4 cycles induction chemoIO showing partial response with CT CAP 2/6/2024 (90% reduction in lung mass) and MRIB 2/23/2024 (s/p SRS, n/e of residual BMs) --start maintenance pembrolizumab 200 mg Q3 wks x 2 years --monitor labs: CBC, CMP, TSH each visit  CKD - current Cr 1.3 --stable  Hyperglycemia --patient was advised to go to ED today, but she is refusing. She stated she ate crab cakes, rise and a lot of carbs last night. She understands that she should go to ED with this glucose level, and polyuria, but she is refusing, understanding there is a risk of significant complications including HHS, DKA, organ failure and death. --continue to follow with endo   Rheumatoid arthritis Being managed by Dr. Johnston   RTC with next Tx with MARIBELL Vee.  3/12/24  onc clinic a/p NSCLC w/mets  Labs : glucose 358 , no anion gap, nl creatinine. wbc 14.98 no ac signs of infection, h/h : 12.5/38.6 , plts 182 Positive for hyperglycemia will give 1 liter of NS  Proceed w/ pembro C1  Cont with current meds: sliding scale for DM , endo f/u Dr. Woodard for DM control cont to check prior to tx and s/s RTC in 3 wks .Encourage to contact the office for any concerns or worsening symptoms. Pt verbalizes understanding  4/2/2024 onc clinic a/p NSCLC , hyperglycemia  Labs: glucose 368 , no gap , normal creatinine  NS 1 liter IVF bolus and insulin 5 units given  Proceed w/ pembro C2 C/w current meds, F/u with endo for better hyperglycemic control c/w checking labs and s/s prior to tx C/w to encourage to take meds regularly and diet sugar control RTC in 3wks .Encourage to contact the office for any concerns or worsening symptoms. Pt verbalizes understanding  5/1/2024 onc clinic a/p NSCLC, hyperglycemia Labs: glucose 395, no gap, normal Cr, no elevated WBC NS 1 liter IVF bolus and insulin 5 units given SQ Proceed w/ pembro C3 Continue with current meds and follow ups with endo  Nutrition referral initiated Continue with checking labs and monitoring s/s prior to tx Pending CAP MRIB  Next visit labs: CBC, CMP, TSH/FT4 RTC in 3 weeks .Encourage to contact the office for any concerns or worsening symptoms. Pt verbalizes understanding

## 2024-05-01 NOTE — PHARMACY COMMUNICATION NOTE - COMMENTS
Patient's glucose was elevated at 395 today. As ordered by provider, admelog and hydration was given. Patient was cleared to receive Pembrolizumab as scheduled today.

## 2024-05-01 NOTE — REVIEW OF SYSTEMS
[FreeTextEntry8] : Frequency  [Diarrhea: Grade 0] : Diarrhea: Grade 0 [Negative] : Allergic/Immunologic [Fever] : no fever [Abdominal Pain] : no abdominal pain [Vomiting] : no vomiting [Dysuria] : no dysuria

## 2024-05-01 NOTE — HISTORY OF PRESENT ILLNESS
[Disease: _____________________] : Disease: [unfilled] [100: Normal, no complaints, no evidence of disease.] : 100: Normal, no complaints, no evidence of disease. [ECOG Performance Status: 0 - Fully active, able to carry on all pre-disease performance without restriction] : Performance Status: 0 - Fully active, able to carry on all pre-disease performance without restriction [de-identified] : Noemi Minor is a 73-year-old female with CKD, DM, RA, HTN who presents to the clinic for follow up of NSCLC with BMs.  Onc Hx: Ex-smoker with history of CKD, baseline creatinine is around 2, diabetes, rheumatoid arthritis, HTN. She was experiencing 1 week of daily falls with lower extremity weakness without dizziness, was admitted to [facility]. MRI brain showed 0.9 cm right frontal lobe mass with surrounding vasogenic edema. CT chest abdomen pelvis revealed left upper lobe mass 2.6 x 4.1 cm in posterior aspect of left upper lobe abutting major fissure.  The mass extends to adjacent left hilum and laterally to left lateral pleura.  1.5 cm left lobe of liver lesion.  1.2 cm pancreatic body cystic lesion. 7/14/2023 bronchoscopy-lingular biopsy poorly differentiated adenocarcinoma, positive TTF-1, TMB 17.3, PD-L1 negative. Tier II: Variants of Potential Clinical Significance STK11 p.([id number]) PIK3CA p.([id number]) TP53 p.([id number]) Tier III: Variants of Unknown Clinical Significance ALK p.([id number]) 8/2023: She followed up at [facility] [place] where she received her first chemotherapy cycle, Taxol/pembrolizumab only due to carboplatin shortage. Taxol was given because her labs there showed a GFR<45, excluding her from being able to receive pemetrexed. She tolerated treatment reasonably well, without any neuropathy or cytopenias. She lost he hair after first chemotherapy cycle. 9/5/2023: PET at [facility] [place]: Left upper lobe perihilar hypermetabolic mass consistent with biopsy-proven malignancy.  Mildly FDG avid right thyroid nodule, correlate with thyroid ultrasound. 9/7/2023: Received cycle 1 paclitaxel/pembrolizumab at [facility] [place] (Cr 1.2 there) 10/10/2023: MRIB: Since 7/11/2023, right posterior frontal enhancing lesion and vasogenic edema are completely resolved as a favorable response to therapy. No new enhancing lesion.  11/16/23 onc clinic  72 y/o f with h/o NSCLC w/BM is here for follow up and tx. Patient reports of feeling well. No ac complaints at this time. Patient's tx was held due to hyperglycemia 478, mildly elevated Lfts that were not her baseline and hyponatremia. Patient does report of eating ice cream/ cake the night before and found eating an orange this morning.   11/22/23 onc clinic  72 y/o f with NSCLC w/BM returns to clinic for f/u and tx. Patient states last week she was sent to the ED and her hyperglycemia was medically managed. She was also tx for a UTI. She reports of feeling well and has no new complaints.   12/18/23  onc clinic  72 y/o f with NSCLC with mets. present today for glucose check. Patient reports of feeling frequency in urination. Denies fevers, n,v, abd pain, dysuria or hematuria.   2/6/24: CT CAP:Since July 11, 2023, significantly decreased left upper lobe lesion. No suspicious lymphadenopathy. No new disease in the chest abdomen or pelvis. Stable incidental findings above. 2/23/24: MRI HEAD: No evidence of abnormal enhancement to suggest residual/recurrent metastatic disease. Chronic lacunar infarctions. Small right temporal occipital chronic infarction. Tiny left cerebellar chronic infarction. Microvascular disease.  3/12/24 onc clinic 73-year-old female with CKD, DM, RA, HTN who presents to the clinic for follow up of NSCLC with BMs. Patient present states to feel well and offers no ac complaints. She states of taking her meds this morning and avoided high sugar foods  prior to visit as instructed. Patient is starting adjuvant  tx with pembrolizumab.  4/2/2024 Onc clinic  72 y/o f with uncontrolled DM, CKD, HTN, NSCLC present to clinic for follow and tx. Patient report to feel well and has no ac complaints. She admit of taking her medications regularly and has been trying to avoid high sugar foods.   5/1/2024 Onc clinic 73 y.o. f with uncontrolled DM, HTN, CKD, NSCLC w/ BM presenting for follow up and tx. Post ER visit from last week due to hyperglycemia treatment was held. Pt reports feeling well, admits trying to control diet and taking her medications. Denies dizziness, SOB, CP, diarrhea, or neuropathies. [de-identified] : Adenocarcinoma [de-identified] : Medonc ([facility] [place]): Dr.Rosa Jeffrey NeuroSx: Dr.[facility]: Dr. Wernicke [FreeTextEntry1] : 9/7/2023: [place] [facility] [place] due to [place] shortage) 10/26/2023: C2 carboplatin/Taxol/pembrolizumab 11/16/23 C3 carboplatin/taxol/pembro (held due to hyperglycemia, elevated lfts and hyponatremia) 11/22/23  C3 carboplatin/taxol/pembro 12/13/23 C4 held due to hyperglycemia.  1/9/2024: C4 carboplatin/Taxol/pembrolizumab  3/12/2024 C1 pembro  4/2/2024.  C2 pembro 4/23/2024. C3 pembro No Show due to hyperglyemia. Pt was seen in ED 5/1/2024. C3 pembro [de-identified] : Feeling ok. Offers no complaints. No irAE or any side effect to treatment last week.

## 2024-05-01 NOTE — HISTORY OF PRESENT ILLNESS
[Disease: _____________________] : Disease: [unfilled] [100: Normal, no complaints, no evidence of disease.] : 100: Normal, no complaints, no evidence of disease. [ECOG Performance Status: 0 - Fully active, able to carry on all pre-disease performance without restriction] : Performance Status: 0 - Fully active, able to carry on all pre-disease performance without restriction [de-identified] : Noemi Minor is a 73-year-old female with CKD, DM, RA, HTN who presents to the clinic for follow up of NSCLC with BMs.  Onc Hx: Ex-smoker with history of CKD, baseline creatinine is around 2, diabetes, rheumatoid arthritis, HTN. She was experiencing 1 week of daily falls with lower extremity weakness without dizziness, was admitted to [facility]. MRI brain showed 0.9 cm right frontal lobe mass with surrounding vasogenic edema. CT chest abdomen pelvis revealed left upper lobe mass 2.6 x 4.1 cm in posterior aspect of left upper lobe abutting major fissure.  The mass extends to adjacent left hilum and laterally to left lateral pleura.  1.5 cm left lobe of liver lesion.  1.2 cm pancreatic body cystic lesion. 7/14/2023 bronchoscopy-lingular biopsy poorly differentiated adenocarcinoma, positive TTF-1, TMB 17.3, PD-L1 negative. Tier II: Variants of Potential Clinical Significance STK11 p.([id number]) PIK3CA p.([id number]) TP53 p.([id number]) Tier III: Variants of Unknown Clinical Significance ALK p.([id number]) 8/2023: [place] [facility] [place] chemotherapy cycle, Taxol/pembrolizumab only due to carboplatin shortage. Taxol was given because her labs there showed a GFR<45, excluding her from being able to receive pemetrexed. She tolerated treatment reasonably well, without any neuropathy or cytopenias. She lost he hair after first chemotherapy cycle. 9[place]5/2023: [place] [facility] [place] Left upper lobe perihilar hypermetabolic mass consistent with biopsy-proven malignancy.  Mildly FDG avid right thyroid nodule, correlate with thyroid ultrasound. 9/7/2023: Re[place] cycle 1 [place] [facility] [place] 10/10/2023: MRIB: Since 7/11/2023, right posterior frontal enhancing lesion and vasogenic edema are completely resolved as a favorable response to therapy. No new enhancing lesion.  11/16/23 onc clinic  72 y/o f with h/o NSCLC w/BM is here for follow up and tx. Patient reports of feeling well. No ac complaints at this time. Patient's tx was held due to hyperglycemia 478, mildly elevated Lfts that were not her baseline and hyponatremia. Patient does report of eating ice cream/ cake the night before and found eating an orange this morning.   11/22/23 onc clinic  72 y/o f with NSCLC w/BM returns to clinic for f/u and tx. Patient states last week she was sent to the ED and her hyperglycemia was medically managed. She was also tx for a UTI. She reports of feeling well and has no new complaints.   12/18/23  onc clinic  72 y/o f with NSCLC with mets. present today for glucose check. Patient reports of feeling frequency in urination. Denies fevers, n,v, abd pain, dysuria or hematuria.   2/6/24: CT CAP:Since July 11, 2023, significantly decreased left upper lobe lesion. No suspicious lymphadenopathy. No new disease in the chest abdomen or pelvis. Stable incidental findings above. 2/23/24: MRI HEAD: No evidence of abnormal enhancement to suggest residual/recurrent metastatic disease. Chronic lacunar infarctions. Small right temporal occipital chronic infarction. Tiny left cerebellar chronic infarction. Microvascular disease.  3/12/24 onc clinic 73-year-old female with CKD, DM, RA, HTN who presents to the clinic for follow up of NSCLC with BMs. Patient present states to feel well and offers no ac complaints. She states of taking her meds this morning and avoided high sugar foods  prior to visit as instructed. Patient is starting adjuvant  tx with pembrolizumab.  4/2/2024 Onc clinic  72 y/o f with uncontrolled DM, CKD, HTN, NSCLC present to clinic for follow and tx. Patient report to feel well and has no ac complaints. She admit of taking her medications regularly and has been trying to avoid high sugar foods.   5/1/2024 Onc clinic 73 y.o. f with uncontrolled DM, HTN, CKD, NSCLC w/ BM presenting for follow up and tx. Post ER visit from last week due to hyperglycemia treatment was held. Pt reports feeling well, admits trying to control diet and taking her medications. Denies dizziness, SOB, CP, diarrhea, or neuropathies. [de-identified] : Adenocarcinoma [de-identified] : Medonc ([facility] [place]): Dr.Rosa Jeffrey NeuroSx: Dr.[facility]: Dr. Wernicke [FreeTextEntry1] : 9/7/2023: Taxol/pembrolizumab C1 given at [facility] [place] (no carboplatin due to national shortage) 10/26/2023: C2 carboplatin/Taxol/pembrolizumab 11/16/23 C3 carboplatin/taxol/pembro (held due to hyperglycemia, elevated lfts and hyponatremia) 11/22/23  C3 carboplatin/taxol/pembro 12/13/23 C4 held due to hyperglycemia.  1/9/2024: C4 carboplatin/Taxol/pembrolizumab  3/12/2024 C1 pembro  4/2/2024.  C2 pembro 4/23/2024. C3 pembro No Show due to hyperglyemia. Pt was seen in ED 5/1/2024. C3 pembro [de-identified] : Feeling ok. Offers no complaints. No irAE or any side effect to treatment last week.

## 2024-05-01 NOTE — ASSESSMENT
[FreeTextEntry1] : 72 yo F with CKD, RA, DM, HTN here for NSCLC with BMs and liver met (STK11 mutant, PIK3CA mutant, TP53 mutant, PD-L1 negative).  NSCLC - BMs s/p SRS, liver met. Received C1 paclitaxel/pembrolizumab on 9/7/2023 at [facility] [place]. This regimen was favored over carbo/pemetrexed/pembrolizumab due to CKD and baseline GFR less than 45. She continued carboplatin/Taxol/pembrolizumab due to CKD Treatment was delayed and held several times because of uncontrolled hyperglycemia --patient very non-compliant with food restrictions Post 4 cycles induction chemoIO showing partial response with CT CAP 2/6/2024 (90% reduction in lung mass) and MRIB 2/23/2024 (s/p SRS, n/e of residual BMs) --start maintenance pembrolizumab 200 mg Q3 wks x 2 years --monitor labs: CBC, CMP, TSH each visit  CKD - current Cr 1.3 --stable  Hyperglycemia --patient was advised to go to ED today, but she is refusing. She stated she ate crab cakes, rise and a lot of carbs last night. She understands that she should go to ED with this glucose level, and polyuria, but she is refusing, understanding there is a risk of significant complications including HHS, DKA, organ failure and death. --continue to follow with endo   Rheumatoid arthritis Being managed by Dr. Johnston   RTC with next Tx with MARIBELL Vee.  3/12/24  onc clinic a/p NSCLC w/mets  Labs : glucose 358 , no anion gap, nl creatinine. wbc 14.98 no ac signs of infection, h/h : 12.5/38.6 , plts 182 Positive for hyperglycemia will give 1 liter of NS  Proceed w/ pembro C1  Cont with current meds: sliding scale for DM , endo f/u Dr. Woodard for DM control cont to check prior to tx and s/s RTC in 3 wks .Encourage to contact the office for any concerns or worsening symptoms. Pt verbalizes understanding  4/2/2024 onc clinic a/p NSCLC , hyperglycemia  Labs: glucose 368 , no gap , normal creatinine  NS 1 liter IVF bolus and insulin 5 units given  Proceed w/ pembro C2 C/w current meds, F/u with endo for better hyperglycemic control c/w checking labs and s/s prior to tx C/w to encourage to take meds regularly and diet sugar control RTC in 3wks .Encourage to contact the office for any concerns or worsening symptoms. Pt verbalizes understanding  5/1/2024 onc clinic a/p NSCLC, hyperglycemia Labs: glucose 395, no gap, normal Cr, no elevated WBC NS 1 liter IVF bolus and insulin 5 units given SQ Proceed w/ pembro C3 Continue with current meds and follow ups with endo  Nutrition referral initiated Continue with checking labs and monitoring s/s prior to tx Pending CAP MRIB  Next visit labs: CBC, CMP, TSH/FT4 RTC in 3 weeks .Encourage to contact the office for any concerns or worsening symptoms. Pt verbalizes understanding

## 2024-05-01 NOTE — ASSESSMENT
[FreeTextEntry1] : 74 yo F with CKD, RA, DM, HTN here for NSCLC with BMs and liver met (STK11 mutant, PIK3CA mutant, TP53 mutant, PD-L1 negative).  NSCLC - BMs s/p SRS, liver met. [place] [facility] [place] regimen was favored over carbo/pemetrexed/pembrolizumab due to CKD and baseline GFR less than 45. She continued carboplatin/Taxol/pembrolizumab due to CKD Treatment was delayed and held several times because of uncontrolled hyperglycemia --patient very non-compliant with food restrictions Post 4 cycles induction chemoIO showing partial response with CT CAP 2/6/2024 (90% reduction in lung mass) and MRIB 2/23/2024 (s/p SRS, n/e of residual BMs) --start maintenance pembrolizumab 200 mg Q3 wks x 2 years --monitor labs: CBC, CMP, TSH each visit  CKD - current Cr 1.3 --stable  Hyperglycemia --patient was advised to go to ED today, but she is refusing. She stated she ate crab cakes, rise and a lot of carbs last night. She understands that she should go to ED with this glucose level, and polyuria, but she is refusing, understanding there is a risk of significant complications including HHS, DKA, organ failure and death. --continue to follow with endo   Rheumatoid arthritis Being managed by Dr. Johnston   RTC with next Tx with MARIBELL Vee.  3/12/24  onc clinic a/p NSCLC w/mets  Labs : glucose 358 , no anion gap, nl creatinine. wbc 14.98 no ac signs of infection, h/h : 12.5/38.6 , plts 182 Positive for hyperglycemia will give 1 liter of NS  Proceed w/ pembro C1  Cont with current meds: sliding scale for DM , endo f/u Dr. Woodard for DM control cont to check prior to tx and s/s RTC in 3 wks .Encourage to contact the office for any concerns or worsening symptoms. Pt verbalizes understanding  4/2/2024 onc clinic a/p NSCLC , hyperglycemia  Labs: glucose 368 , no gap , normal creatinine  NS 1 liter IVF bolus and insulin 5 units given  Proceed w/ pembro C2 C/w current meds, F/u with endo for better hyperglycemic control c/w checking labs and s/s prior to tx C/w to encourage to take meds regularly and diet sugar control RTC in 3wks .Encourage to contact the office for any concerns or worsening symptoms. Pt verbalizes understanding  5/1/2024 onc clinic a/p NSCLC, hyperglycemia Labs: glucose 395, no gap, normal Cr, no elevated WBC NS 1 liter IVF bolus and insulin 5 units given SQ Proceed w/ pembro C3 Continue with current meds and follow ups with endo  Nutrition referral initiated Continue with checking labs and monitoring s/s prior to tx Pending CAP MRIB  Next visit labs: CBC, CMP, TSH/FT4 RTC in 3 weeks .Encourage to contact the office for any concerns or worsening symptoms. Pt verbalizes understanding

## 2024-05-02 LAB — TSH SERPL-ACNC: 0.39 UIU/ML

## 2024-05-13 ENCOUNTER — NON-APPOINTMENT (OUTPATIENT)
Age: 74
End: 2024-05-13

## 2024-05-13 NOTE — PRE-ANESTHESIA EVALUATION ADULT - NSANTHOSAYNRD_GEN_A_CORE
Take medication as prescribed.   Rest and Increase fluids.  Limit physical activity and repetitive motion to this right side hip/leg  Follow up with your PCP in the next 1 month if pain does not resolve.     
No. LEATHA screening performed.  STOP BANG Legend: 0-2 = LOW Risk; 3-4 = INTERMEDIATE Risk; 5-8 = HIGH Risk

## 2024-05-14 ENCOUNTER — APPOINTMENT (OUTPATIENT)
Dept: HEMATOLOGY ONCOLOGY | Facility: CLINIC | Age: 74
End: 2024-05-14

## 2024-05-15 NOTE — ASSESSMENT
[FreeTextEntry1] : 74 yo F with CKD, RA, DM, HTN here for NSCLC with BMs and liver met (STK11 mutant, PIK3CA mutant, TP53 mutant, PD-L1 negative).  NSCLC - BMs s/p SRS, liver met. Received C1 paclitaxel/pembrolizumab on 9/7/2023 at [facility] [place]. This regimen was favored over carbo/pemetrexed/pembrolizumab due to CKD and baseline GFR less than 45. She continued carboplatin/Taxol/pembrolizumab due to CKD Treatment was delayed and held several times because of uncontrolled hyperglycemia --patient very non-compliant with food restrictions Post 4 cycles induction chemoIO showing partial response with CT CAP 2/6/2024 (90% reduction in lung mass) and MRIB 2/23/2024 (s/p SRS, n/e of residual BMs) --start maintenance pembrolizumab 200 mg Q3 wks x 2 years --monitor labs: CBC, CMP, TSH each visit  CKD - current Cr 1.3 --stable  Hyperglycemia --patient was advised to go to ED today, but she is refusing. She stated she ate crab cakes, rise and a lot of carbs last night. She understands that she should go to ED with this glucose level, and polyuria, but she is refusing, understanding there is a risk of significant complications including HHS, DKA, organ failure and death. --continue to follow with endo   Rheumatoid arthritis Being managed by Dr. Johnston   RTC with next Tx with MARIBELL Vee.  3/12/24  onc clinic a/p NSCLC w/mets  Labs : glucose 358 , no anion gap, nl creatinine. wbc 14.98 no ac signs of infection, h/h : 12.5/38.6 , plts 182 Positive for hyperglycemia will give 1 liter of NS  Proceed w/ pembro C1  Cont with current meds: sliding scale for DM , endo f/u Dr. Woodard for DM control cont to check prior to tx and s/s RTC in 3 wks .Encourage to contact the office for any concerns or worsening symptoms. Pt verbalizes understanding  4/2/2024 onc clinic a/p NSCLC , hyperglycemia  Labs: glucose 368 , no gap , normal creatinine  NS 1 liter IVF bolus and insulin 5 units given  Proceed w/ pembro C2 C/w current meds, F/u with endo for better hyperglycemic control c/w checking labs and s/s prior to tx C/w to encourage to take meds regularly and diet sugar control RTC in 3wks .Encourage to contact the office for any concerns or worsening symptoms. Pt verbalizes understanding  5/1/2024 onc clinic a/p NSCLC, hyperglycemia Labs: glucose 395, no gap, normal Cr, no elevated WBC NS 1 liter IVF bolus and insulin 5 units given SQ Proceed w/ pembro C3 Continue with current meds and follow ups with endo  Nutrition referral initiated Continue with checking labs and monitoring s/s prior to tx Pending CAP MRIB  Next visit labs: CBC, CMP, TSH/FT4 RTC in 3 weeks .Encourage to contact the office for any concerns or worsening symptoms. Pt verbalizes understanding

## 2024-05-15 NOTE — REVIEW OF SYSTEMS
[Diarrhea: Grade 0] : Diarrhea: Grade 0 [Negative] : Allergic/Immunologic [Fever] : no fever [Abdominal Pain] : no abdominal pain [Vomiting] : no vomiting [Dysuria] : no dysuria

## 2024-05-15 NOTE — HISTORY OF PRESENT ILLNESS
[Disease: _____________________] : Disease: [unfilled] [100: Normal, no complaints, no evidence of disease.] : 100: Normal, no complaints, no evidence of disease. [ECOG Performance Status: 0 - Fully active, able to carry on all pre-disease performance without restriction] : Performance Status: 0 - Fully active, able to carry on all pre-disease performance without restriction [de-identified] : Noemi Minor is a 73-year-old female with CKD, DM, RA, HTN who presents to the clinic for follow up of NSCLC with BMs.  Onc Hx: Ex-smoker with history of CKD, baseline creatinine is around 2, diabetes, rheumatoid arthritis, HTN. She was experiencing 1 week of daily falls with lower extremity weakness without dizziness, was admitted to [facility]. MRI brain showed 0.9 cm right frontal lobe mass with surrounding vasogenic edema. CT chest abdomen pelvis revealed left upper lobe mass 2.6 x 4.1 cm in posterior aspect of left upper lobe abutting major fissure.  The mass extends to adjacent left hilum and laterally to left lateral pleura.  1.5 cm left lobe of liver lesion.  1.2 cm pancreatic body cystic lesion. 7/14/2023 bronchoscopy-lingular biopsy poorly differentiated adenocarcinoma, positive TTF-1, TMB 17.3, PD-L1 negative. Tier II: Variants of Potential Clinical Significance STK11 p.([id number]) PIK3CA p.([id number]) TP53 p.([id number]) Tier III: Variants of Unknown Clinical Significance ALK p.([id number]) 8/2023: [place] [facility] [place] received her first chemotherapy cycle, Taxol/pembrolizumab only due to carboplatin shortage. Taxol was given because her labs there showed a GFR<45, excluding her from being able to receive pemetrexed. She tolerated treatment reasonably well, without any neuropathy or cytopenias. She lost he hair after first chemotherapy cycle. 9/5/2023: [place] [facility] [place]: Left upper lobe perihilar hypermetabolic mass consistent with biopsy-proven malignancy.  Mildly FDG avid right thyroid nodule, correlate with thyroid ultrasound. 9/7/2023: Received cycle 1 paclitaxel/pembrolizumab [place] [facility] [place]Cr 1.2 there) 10/10/2023: MRIB: Since 7/11/2023, right posterior frontal enhancing lesion and vasogenic edema are completely resolved as a favorable response to therapy. No new enhancing lesion.  11/16/23 onc clinic  74 y/o f with h/o NSCLC w/BM is here for follow up and tx. Patient reports of feeling well. No ac complaints at this time. Patient's tx was held due to hyperglycemia 478, mildly elevated Lfts that were not her baseline and hyponatremia. Patient does report of eating ice cream/ cake the night before and found eating an orange this morning.   11/22/23 onc clinic  74 y/o f with NSCLC w/BM returns to clinic for f/u and tx. Patient states last week she was sent to the ED and her hyperglycemia was medically managed. She was also tx for a UTI. She reports of feeling well and has no new complaints.   12/18/23  onc clinic  74 y/o f with NSCLC with mets. present today for glucose check. Patient reports of feeling frequency in urination. Denies fevers, n,v, abd pain, dysuria or hematuria.   2/6/24: CT CAP:Since July 11, 2023, significantly decreased left upper lobe lesion. No suspicious lymphadenopathy. No new disease in the chest abdomen or pelvis. Stable incidental findings above. 2/23/24: MRI HEAD: No evidence of abnormal enhancement to suggest residual/recurrent metastatic disease. Chronic lacunar infarctions. Small right temporal occipital chronic infarction. Tiny left cerebellar chronic infarction. Microvascular disease.  3/12/24 onc clinic 73-year-old female with CKD, DM, RA, HTN who presents to the clinic for follow up of NSCLC with BMs. Patient present states to feel well and offers no ac complaints. She states of taking her meds this morning and avoided high sugar foods  prior to visit as instructed. Patient is starting adjuvant  tx with pembrolizumab.  4/2/2024 Onc clinic  74 y/o f with uncontrolled DM, CKD, HTN, NSCLC present to clinic for follow and tx. Patient report to feel well and has no ac complaints. She admit of taking her medications regularly and has been trying to avoid high sugar foods.   5/1/2024 Onc clinic 73 y.o. f with uncontrolled DM, HTN, CKD, NSCLC w/ BM presenting for follow up and tx. Post ER visit from last week due to hyperglycemia treatment was held. Pt reports feeling well, admits trying to control diet and taking her medications. Denies dizziness, SOB, CP, diarrhea, or neuropathies. [de-identified] : Adenocarcinoma [de-identified] : [facility] Dr.Rosa Jeffrey NeuroSx: Dr.[facility]: Dr. Wernicke [FreeTextEntry1] : 9/7/2023: Taxol/pembrolizumab C1 [place] [facility] [place] (no carboplatin due to national shortage) 10/26/2023: C2 carboplatin/Taxol/pembrolizumab 11/16/23 C3 carboplatin/taxol/pembro (held due to hyperglycemia, elevated lfts and hyponatremia) 11/22/23  C3 carboplatin/taxol/pembro 12/13/23 C4 held due to hyperglycemia.  1/9/2024: C4 carboplatin/Taxol/pembrolizumab  3/12/2024 C1 pembro  4/2/2024.  C2 pembro 4/23/2024. C3 pembro No Show due to hyperglyemia. Pt was seen in ED 5/1/2024. C3 pembro [de-identified] : Feeling ok. Offers no complaints. No irAE or any side effect to treatment last week.

## 2024-05-17 RX ORDER — PEMBROLIZUMAB 25 MG/ML
200 INJECTION, SOLUTION INTRAVENOUS ONCE
Refills: 0 | Status: COMPLETED | OUTPATIENT
Start: 2024-06-06 | End: 2024-06-06

## 2024-05-21 ENCOUNTER — APPOINTMENT (OUTPATIENT)
Dept: INFUSION THERAPY | Facility: CLINIC | Age: 74
End: 2024-05-21

## 2024-05-23 NOTE — ED ADULT NURSE NOTE - DOES PATIENT HAVE ADVANCE DIRECTIVE
CARDIAC CATHETERIZATION DISCHARGE INSTRUCTIONS     FOR SUDDEN AND SEVERE CHEST PAIN, SHORTNESS OF BREATH, EXCESSIVE BLEEDING, SIGNS OF STROKE, OR CHANGES IN MENTAL STATUS YOU SHOULD CALL 911 IMMEDIATELY.     If your provider has prescribed aspirin and/or clopidogrel (Plavix), or prasugrel (Effient), or ticagrelor (Brilinta), DO NOT STOP THESE MEDICATIONS for any reason without talking to your cardiologist first. If any of these were prescribed, you must take them every day without missing a single dose. If you are getting low on these medications, contact your provider immediately for a refill.     FOR NEXT 24 HOURS  - Upon discharge, you should return home and rest for the remainder of the day and evening. You do not have to stay on bed rest but should not be very active.  It is recommended a responsible adult be with you for the first 24 hours after the procedure.    - No driving for 24 hours after procedure. Please arrange for someone to drive you home from the hospital today.     - Do not drive, operate machinery, or use power tools for 24 hours after your procedure.     - Do not make any legal decisions for 24 hours after your procedure.     - Do not drink alcoholic beverages for 24 hours after your procedure.    WOUND CARE   *FOR FEMORAL (LEG) ACCESS*  ·      Avoid heavy lifting (over 10 pounds) for 7 days, squatting or excessive bending for 2 days, and strenuous exercise for 7 days.  ·      No submerged bathing, swimming, or hot tubs for the next 7 days, or until fully healed.  ·      Avoid sexual activity for 3-4 days until any groin discomfort has ceased.     *FOR RADIAL (WRIST) ACCESS*  ·      No lifting more than 5 pounds or excessive use of the wrist for 24 hours - for example, treat your wrist as if it is sprained.  ·      Do not engage in vigorous activities (tennis, golf, bowling, weights) for at least 48 hours after the procedure.  ·      Do not submerge the wrist for 7 days after the  procedure.  ·      You should expect mild tingling in your hand and tenderness at the puncture site for up to 3 days.    - The transparent dressing should be removed from the site 24 hours after the procedure.  Wash the site gently with soap and water. Rinse well and pat dry. Keep the area clean and dry. You may apply a Band-Aid to the site. Avoid lotions, ointments, or powders until fully healed.     - You may shower the day after your procedure.      - It is normal to notice a small bruise around the puncture site and/or a small grape sized or smaller lump. Any large bruising or large lump warrants a call to the office.     - If bleeding should occur, lay down and apply pressure to the affected area for 10 minutes.  If the bleeding stops notify your physician.  If there is a large amount of bleeding or spurting of blood CALL 911 immediately.  DO NOT drive yourself to the hospital.    - You may experience some tenderness, bruising or minimal inflammation.  If you have any concerns, you may contact the Cath Lab or if any of these symptoms become excessive, contact your cardiologist or go to the emergency room.     OTHER INSTRUCTIONS  - You may take acetaminophen (Tylenol) as directed for discomfort.  If pain is not relieved with acetaminophen (Tylenol), contact your doctor.    - If you notice or experience any of the following, you should notify your doctor or seek medical attention  Chest pain or discomfort  Change in mental status or weakness in extremities.  Dizziness, light headedness, or feeling faint.  Change in the site where the procedure was performed, such as bleeding or an increased area of bruising or swelling.  Tingling, numbness, pain, or coolness in the leg/arm beyond the site where the procedure was performed.  Signs of infection (i.e. shaking chills, temperature > 100 degrees Fahrenheit, warmth, redness) in the leg/arm area where the procedure was performed.  Changes in urination   Bloody or black  stools  Vomiting blood  Severe nose bleeds  Any excessive bleeding    - If you DO NOT have an appointment with your cardiologist within 2-4 weeks following your procedure, please contact their office.      No

## 2024-05-24 NOTE — ASSESSMENT
[FreeTextEntry1] : 74 yo F with CKD, RA, DM, HTN here for NSCLC with BMs and liver met (STK11 mutant, PIK3CA mutant, TP53 mutant, PD-L1 negative).  NSCLC - BMs s/p SRS, liver met. [place]1 [place] [facility] [place] regimen was favored over carbo/pemetrexed/pembrolizumab due to CKD and baseline GFR less than 45. She continued carboplatin/Taxol/pembrolizumab due to CKD Treatment was delayed and held several times because of uncontrolled hyperglycemia --patient very non-compliant with food restrictions Post 4 cycles induction chemoIO showing partial response with CT CAP 2/6/2024 (90% reduction in lung mass) and MRIB 2/23/2024 (s/p SRS, n/e of residual BMs) --start maintenance pembrolizumab 200 mg Q3 wks x 2 years --monitor labs: CBC, CMP, TSH each visit  CKD - current Cr 1.3 --stable  Hyperglycemia --patient was advised to go to ED today, but she is refusing. She stated she ate crab cakes, rise and a lot of carbs last night. She understands that she should go to ED with this glucose level, and polyuria, but she is refusing, understanding there is a risk of significant complications including HHS, DKA, organ failure and death. --continue to follow with endo   Rheumatoid arthritis Being managed by Dr. Johnston   RTC with next Tx with MARIBELL Vee.  3/12/24  onc clinic a/p NSCLC w/mets  Labs : glucose 358 , no anion gap, nl creatinine. wbc 14.98 no ac signs of infection, h/h : 12.5/38.6 , plts 182 Positive for hyperglycemia will give 1 liter of NS  Proceed w/ pembro C1  Cont with current meds: sliding scale for DM , endo f/u Dr. Woodard for DM control cont to check prior to tx and s/s RTC in 3 wks .Encourage to contact the office for any concerns or worsening symptoms. Pt verbalizes understanding  4/2/2024 onc clinic a/p NSCLC , hyperglycemia  Labs: glucose 368 , no gap , normal creatinine  NS 1 liter IVF bolus and insulin 5 units given  Proceed w/ pembro C2 C/w current meds, F/u with endo for better hyperglycemic control c/w checking labs and s/s prior to tx C/w to encourage to take meds regularly and diet sugar control RTC in 3wks .Encourage to contact the office for any concerns or worsening symptoms. Pt verbalizes understanding  5/1/2024 onc clinic a/p NSCLC, hyperglycemia Labs: glucose 395, no gap, normal Cr, no elevated WBC NS 1 liter IVF bolus and insulin 5 units given SQ Proceed w/ pembro C3 Continue with current meds and follow ups with endo  Nutrition referral initiated Continue with checking labs and monitoring s/s prior to tx Pending CAP MRIB  Next visit labs: CBC, CMP, TSH/FT4 RTC in 3 weeks .Encourage to contact the office for any concerns or worsening symptoms. Pt verbalizes understanding

## 2024-05-24 NOTE — REVIEW OF SYSTEMS
[Fever] : no fever [Abdominal Pain] : no abdominal pain [Vomiting] : no vomiting [Diarrhea: Grade 0] : Diarrhea: Grade 0 [Dysuria] : no dysuria [Negative] : Allergic/Immunologic

## 2024-05-24 NOTE — HISTORY OF PRESENT ILLNESS
[Disease: _____________________] : Disease: [unfilled] [de-identified] : Noemi Minor is a 73-year-old female with CKD, DM, RA, HTN who presents to the clinic for follow up of NSCLC with BMs.  Onc Hx: Ex-smoker with history of CKD, baseline creatinine is around 2, diabetes, rheumatoid arthritis, HTN. She was experiencing 1 week of daily falls with lower extremity weakness without dizziness, was admitted to [facility]. MRI brain showed 0.9 cm right frontal lobe mass with surrounding vasogenic edema. CT chest abdomen pelvis revealed left upper lobe mass 2.6 x 4.1 cm in posterior aspect of left upper lobe abutting major fissure.  The mass extends to adjacent left hilum and laterally to left lateral pleura.  1.5 cm left lobe of liver lesion.  1.2 cm pancreatic body cystic lesion. 7/14/2023 bronchoscopy-lingular biopsy poorly differentiated adenocarcinoma, positive TTF-1, TMB 17.3, PD-L1 negative. Tier II: Variants of Potential Clinical Significance STK11 p.([id number]) PIK3CA p.([id number]) TP53 p.([id number]) Tier III: Variants of Unknown Clinical Significance ALK p.([id number]) 8/2023: She followed up at [facility] [place] where she received her first chemotherapy cycle, Taxol/pembrolizumab only due to carboplatin shortage. Taxol was given because her labs there showed a GFR<45, excluding her from being able to receive pemetrexed. She tolerated treatment reasonably well, without any neuropathy or cytopenias. She lost he hair after first chemotherapy cycle. 9/5/2023: PET at [facility] [place]: Left upper lobe perihilar hypermetabolic mass consistent with biopsy-proven malignancy.  Mildly FDG avid right thyroid nodule, correlate with thyroid ultrasound. 9/7/2023: Received cycle 1 paclitaxel/pembrolizumab at [facility] [place] (Cr 1.2 there) 10/10/2023: MRIB: Since 7/11/2023, right posterior frontal enhancing lesion and vasogenic edema are completely resolved as a favorable response to therapy. No new enhancing lesion.  11/16/23 onc clinic  72 y/o f with h/o NSCLC w/BM is here for follow up and tx. Patient reports of feeling well. No ac complaints at this time. Patient's tx was held due to hyperglycemia 478, mildly elevated Lfts that were not her baseline and hyponatremia. Patient does report of eating ice cream/ cake the night before and found eating an orange this morning.   11/22/23 onc clinic  72 y/o f with NSCLC w/BM returns to clinic for f/u and tx. Patient states last week she was sent to the ED and her hyperglycemia was medically managed. She was also tx for a UTI. She reports of feeling well and has no new complaints.   12/18/23  onc clinic  72 y/o f with NSCLC with mets. present today for glucose check. Patient reports of feeling frequency in urination. Denies fevers, n,v, abd pain, dysuria or hematuria.   2/6/24: CT CAP:Since July 11, 2023, significantly decreased left upper lobe lesion. No suspicious lymphadenopathy. No new disease in the chest abdomen or pelvis. Stable incidental findings above. 2/23/24: MRI HEAD: No evidence of abnormal enhancement to suggest residual/recurrent metastatic disease. Chronic lacunar infarctions. Small right temporal occipital chronic infarction. Tiny left cerebellar chronic infarction. Microvascular disease.  3/12/24 onc clinic 73-year-old female with CKD, DM, RA, HTN who presents to the clinic for follow up of NSCLC with BMs. Patient present states to feel well and offers no ac complaints. She states of taking her meds this morning and avoided high sugar foods  prior to visit as instructed. Patient is starting adjuvant  tx with pembrolizumab.  4/2/2024 Onc clinic  72 y/o f with uncontrolled DM, CKD, HTN, NSCLC present to clinic for follow and tx. Patient report to feel well and has no ac complaints. She admit of taking her medications regularly and has been trying to avoid high sugar foods.   5/1/2024 Onc clinic 73 y.o. f with uncontrolled DM, HTN, CKD, NSCLC w/ BM presenting for follow up and tx. Post ER visit from last week due to hyperglycemia treatment was held. Pt reports feeling well, admits trying to control diet and taking her medications. Denies dizziness, SOB, CP, diarrhea, or neuropathies. [de-identified] : Adenocarcinoma [de-identified] : [facility] [place] [place].Rosa Jeffrey NeuroSx: Dr.[facility]: Dr. Wernicke [FreeTextEntry1] : 9/7/2023: Taxol/pembrolizumab C1 given at [facility] [place] (no carboplatin due to national shortage) 10/26/2023: C2 carboplatin/Taxol/pembrolizumab 11/16/23 C3 carboplatin/taxol/pembro (held due to hyperglycemia, elevated lfts and hyponatremia) 11/22/23  C3 carboplatin/taxol/pembro 12/13/23 C4 held due to hyperglycemia.  1/9/2024: C4 carboplatin/Taxol/pembrolizumab  3/12/2024 C1 pembro  4/2/2024.  C2 pembro 4/23/2024. C3 pembro No Show due to hyperglyemia. Pt was seen in ED 5/1/2024. C3 pembro [de-identified] : Feeling ok. Offers no complaints. No irAE or any side effect to treatment last week. [100: Normal, no complaints, no evidence of disease.] : 100: Normal, no complaints, no evidence of disease. [ECOG Performance Status: 0 - Fully active, able to carry on all pre-disease performance without restriction] : Performance Status: 0 - Fully active, able to carry on all pre-disease performance without restriction

## 2024-05-28 ENCOUNTER — APPOINTMENT (OUTPATIENT)
Dept: HEMATOLOGY ONCOLOGY | Facility: CLINIC | Age: 74
End: 2024-05-28

## 2024-05-29 ENCOUNTER — NON-APPOINTMENT (OUTPATIENT)
Age: 74
End: 2024-05-29

## 2024-06-04 ENCOUNTER — NON-APPOINTMENT (OUTPATIENT)
Age: 74
End: 2024-06-04

## 2024-06-06 ENCOUNTER — OUTPATIENT (OUTPATIENT)
Dept: OUTPATIENT SERVICES | Facility: HOSPITAL | Age: 74
LOS: 1 days | End: 2024-06-06
Payer: MEDICARE

## 2024-06-06 ENCOUNTER — NON-APPOINTMENT (OUTPATIENT)
Age: 74
End: 2024-06-06

## 2024-06-06 ENCOUNTER — APPOINTMENT (OUTPATIENT)
Dept: INFUSION THERAPY | Facility: CLINIC | Age: 74
End: 2024-06-06

## 2024-06-06 VITALS
RESPIRATION RATE: 18 BRPM | DIASTOLIC BLOOD PRESSURE: 91 MMHG | HEART RATE: 95 BPM | TEMPERATURE: 97 F | WEIGHT: 149.03 LBS | OXYGEN SATURATION: 95 % | HEIGHT: 63 IN | SYSTOLIC BLOOD PRESSURE: 170 MMHG

## 2024-06-06 VITALS — SYSTOLIC BLOOD PRESSURE: 189 MMHG | DIASTOLIC BLOOD PRESSURE: 107 MMHG

## 2024-06-06 DIAGNOSIS — C34.90 MALIGNANT NEOPLASM OF UNSPECIFIED PART OF UNSPECIFIED BRONCHUS OR LUNG: ICD-10-CM

## 2024-06-06 DIAGNOSIS — Z96.641 PRESENCE OF RIGHT ARTIFICIAL HIP JOINT: Chronic | ICD-10-CM

## 2024-06-06 LAB
ALBUMIN SERPL ELPH-MCNC: 3.4 G/DL — SIGNIFICANT CHANGE UP (ref 3.3–5)
ALP SERPL-CCNC: 94 U/L — SIGNIFICANT CHANGE UP (ref 40–120)
ALT FLD-CCNC: 12 U/L — SIGNIFICANT CHANGE UP (ref 10–45)
ANION GAP SERPL CALC-SCNC: 11 MMOL/L — SIGNIFICANT CHANGE UP (ref 5–17)
AST SERPL-CCNC: 22 U/L — SIGNIFICANT CHANGE UP (ref 10–40)
BILIRUB SERPL-MCNC: 0.6 MG/DL — SIGNIFICANT CHANGE UP (ref 0.2–1.2)
BUN SERPL-MCNC: 37 MG/DL — HIGH (ref 7–23)
CALCIUM SERPL-MCNC: 10.3 MG/DL — SIGNIFICANT CHANGE UP (ref 8.4–10.5)
CHLORIDE SERPL-SCNC: 103 MMOL/L — SIGNIFICANT CHANGE UP (ref 96–108)
CO2 SERPL-SCNC: 27 MMOL/L — SIGNIFICANT CHANGE UP (ref 22–31)
CREAT SERPL-MCNC: 1.3 MG/DL — SIGNIFICANT CHANGE UP (ref 0.5–1.3)
EGFR: 43 ML/MIN/1.73M2 — LOW
GLUCOSE SERPL-MCNC: 288 MG/DL — HIGH (ref 70–99)
HCT VFR BLD CALC: 33.1 % — LOW (ref 34.5–45)
HGB BLD-MCNC: 10.4 G/DL — LOW (ref 11.5–15.5)
LYMPHOCYTES # BLD AUTO: 0.8 K/UL — LOW (ref 1–3.3)
LYMPHOCYTES # BLD AUTO: 11.3 % — LOW (ref 13–44)
MAGNESIUM SERPL-MCNC: 1.6 MG/DL — SIGNIFICANT CHANGE UP (ref 1.6–2.6)
MCHC RBC-ENTMCNC: 28.1 PG — SIGNIFICANT CHANGE UP (ref 27–34)
MCHC RBC-ENTMCNC: 31.4 GM/DL — LOW (ref 32–36)
MCV RBC AUTO: 89.5 FL — SIGNIFICANT CHANGE UP (ref 80–100)
NEUTROPHILS # BLD AUTO: 6 K/UL — SIGNIFICANT CHANGE UP (ref 1.8–7.4)
NEUTROPHILS NFR BLD AUTO: 87.6 % — HIGH (ref 43–77)
PLATELET # BLD AUTO: 197 K/UL — SIGNIFICANT CHANGE UP (ref 150–400)
POTASSIUM SERPL-MCNC: 4.9 MMOL/L — SIGNIFICANT CHANGE UP (ref 3.5–5.3)
POTASSIUM SERPL-SCNC: 4.9 MMOL/L — SIGNIFICANT CHANGE UP (ref 3.5–5.3)
PROT SERPL-MCNC: 7.7 G/DL — SIGNIFICANT CHANGE UP (ref 6–8.3)
RBC # BLD: 3.7 M/UL — LOW (ref 3.8–5.2)
RBC # FLD: 14.6 % — HIGH (ref 10.3–14.5)
SODIUM SERPL-SCNC: 141 MMOL/L — SIGNIFICANT CHANGE UP (ref 135–145)
T4 AB SER-ACNC: 7.99 UG/DL — SIGNIFICANT CHANGE UP (ref 4.5–11.7)
TSH SERPL-MCNC: 0.6 UIU/ML — SIGNIFICANT CHANGE UP (ref 0.27–4.2)
WBC # BLD: 6.9 K/UL — SIGNIFICANT CHANGE UP (ref 3.8–10.5)
WBC # FLD AUTO: 6.9 K/UL — SIGNIFICANT CHANGE UP (ref 3.8–10.5)

## 2024-06-06 PROCEDURE — 84443 ASSAY THYROID STIM HORMONE: CPT

## 2024-06-06 PROCEDURE — 85025 COMPLETE CBC W/AUTO DIFF WBC: CPT

## 2024-06-06 PROCEDURE — 36415 COLL VENOUS BLD VENIPUNCTURE: CPT

## 2024-06-06 PROCEDURE — 84436 ASSAY OF TOTAL THYROXINE: CPT

## 2024-06-06 PROCEDURE — 96413 CHEMO IV INFUSION 1 HR: CPT

## 2024-06-06 PROCEDURE — 80053 COMPREHEN METABOLIC PANEL: CPT

## 2024-06-06 PROCEDURE — 83735 ASSAY OF MAGNESIUM: CPT

## 2024-06-06 RX ADMIN — PEMBROLIZUMAB 200 MILLIGRAM(S): 25 INJECTION, SOLUTION INTRAVENOUS at 15:00

## 2024-06-06 RX ADMIN — PEMBROLIZUMAB 200 MILLIGRAM(S): 25 INJECTION, SOLUTION INTRAVENOUS at 15:30

## 2024-06-07 ENCOUNTER — APPOINTMENT (OUTPATIENT)
Dept: HOME HEALTH SERVICES | Facility: HOME HEALTH | Age: 74
End: 2024-06-07
Payer: MEDICARE

## 2024-06-07 DIAGNOSIS — R53.81 OTHER MALAISE: ICD-10-CM

## 2024-06-07 DIAGNOSIS — Z79.4 TYPE 2 DIABETES MELLITUS WITH HYPERGLYCEMIA: ICD-10-CM

## 2024-06-07 DIAGNOSIS — E66.9 OBESITY, UNSPECIFIED: ICD-10-CM

## 2024-06-07 DIAGNOSIS — R60.0 LOCALIZED EDEMA: ICD-10-CM

## 2024-06-07 DIAGNOSIS — Z86.018 PERSONAL HISTORY OF OTHER BENIGN NEOPLASM: ICD-10-CM

## 2024-06-07 DIAGNOSIS — Z71.89 OTHER SPECIFIED COUNSELING: ICD-10-CM

## 2024-06-07 DIAGNOSIS — Z87.39 PERSONAL HISTORY OF OTHER DISEASES OF THE MUSCULOSKELETAL SYSTEM AND CONNECTIVE TISSUE: ICD-10-CM

## 2024-06-07 DIAGNOSIS — T84.028A DISLOCATION OF OTHER INTERNAL JOINT PROSTHESIS, INITIAL ENCOUNTER: ICD-10-CM

## 2024-06-07 DIAGNOSIS — S02.401A MAXILLARY FRACTURE, UNSPECIFIED SIDE, INITIAL ENCOUNTER FOR CLOSED FRACTURE: ICD-10-CM

## 2024-06-07 DIAGNOSIS — E11.9 TYPE 2 DIABETES MELLITUS W/OUT COMPLICATIONS: ICD-10-CM

## 2024-06-07 DIAGNOSIS — R29.6 REPEATED FALLS: ICD-10-CM
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
## 2024-06-07 DIAGNOSIS — M06.9 RHEUMATOID ARTHRITIS, UNSPECIFIED: ICD-10-CM

## 2024-06-07 DIAGNOSIS — Z28.21 IMMUNIZATION NOT CARRIED OUT BECAUSE OF PATIENT REFUSAL: ICD-10-CM

## 2024-06-07 DIAGNOSIS — N18.4 CHRONIC KIDNEY DISEASE, STAGE 4 (SEVERE): ICD-10-CM

## 2024-06-07 DIAGNOSIS — Z96.649 DISLOCATION OF OTHER INTERNAL JOINT PROSTHESIS, INITIAL ENCOUNTER: ICD-10-CM

## 2024-06-07 DIAGNOSIS — E11.65 TYPE 2 DIABETES MELLITUS WITH HYPERGLYCEMIA: ICD-10-CM

## 2024-06-07 PROCEDURE — 99349 HOME/RES VST EST MOD MDM 40: CPT

## 2024-06-07 NOTE — CHRONIC CARE ASSESSMENT
[Less than 3 Times Per Week] : exercises less than 3 times per week [> 30 Minutes/Session] : (> 30 minutes per session) [Walking] : walking [Diabetic Diet] : diabetic [Low Fat Diet] : low fat [Low Carb Diet] : low carbohydrate [Low Salt Diet] : low salt [de-identified] : Lung CA metastasis s/p chemo therapy [de-identified] : low fat, low carb, low sodium, diabetic diet [Oriented To Person] : ~L oriented to person [Oriented To Place] : ~L oriented to place [Oriented To Time] : ~L oriented to time [Oriented To Situation] : ~L oriented to situation [No Action Needed] : No action needed [Reviewed depression screen from comprehensive assessment] : Reviewed depression screen from comprehensive assessment [Over the Past 2 Weeks, Have You Felt Down, Depressed, or Hopeless?] : 1.) Over the past 2 weeks, have you felt down, depressed, or hopeless? No [Over the Past 2 Weeks, Have You Felt Little Interest or Pleasure Doing Things?] : 2.) Over the past 2 weeks, have you felt little interest or pleasure doing things? No [de-identified] : Denies pain [de-identified] : multiple falls with injury bruises to the face [de-identified] : walks as tolerated [de-identified] : low sodium, low fat, low carb, diabetic diet [PPS Score: ____] : Palliative Performance Scale (PPS) Score: [unfilled]

## 2024-06-08 VITALS
OXYGEN SATURATION: 96 % | TEMPERATURE: 98.3 F | RESPIRATION RATE: 20 BRPM | HEART RATE: 77 BPM | HEIGHT: 63 IN | SYSTOLIC BLOOD PRESSURE: 134 MMHG | DIASTOLIC BLOOD PRESSURE: 76 MMHG

## 2024-06-08 PROBLEM — Z28.21 INFLUENZA VACCINATION DECLINED: Status: RESOLVED | Noted: 2023-10-30 | Resolved: 2024-06-08

## 2024-06-08 PROBLEM — E11.9 DIABETES: Noted: 2017-09-07

## 2024-06-08 PROBLEM — R53.81 MULTIFACTORIAL FUNCTIONAL IMPAIRMENT: Status: ACTIVE | Noted: 2024-06-08

## 2024-06-08 PROBLEM — R29.6 MULTIPLE FALLS: Status: ACTIVE | Noted: 2024-06-08

## 2024-06-08 PROBLEM — Z87.39 HISTORY OF HERNIATED INTERVERTEBRAL DISC: Status: RESOLVED | Noted: 2017-09-07 | Resolved: 2024-06-08

## 2024-06-08 PROBLEM — N18.4 STAGE 4 CHRONIC KIDNEY DISEASE: Status: ACTIVE | Noted: 2019-12-26

## 2024-06-08 PROBLEM — Z71.89 ACP (ADVANCE CARE PLANNING): Status: ACTIVE | Noted: 2023-10-30

## 2024-06-08 PROBLEM — R60.0 EDEMA OF LOWER EXTREMITY: Status: ACTIVE | Noted: 2024-06-07

## 2024-06-08 PROBLEM — E66.9 OBESITY (BMI 30.0-34.9): Status: ACTIVE | Noted: 2018-11-29

## 2024-06-08 PROBLEM — Z86.018 HISTORY OF UTERINE LEIOMYOMA: Status: RESOLVED | Noted: 2023-06-21 | Resolved: 2024-06-08

## 2024-06-08 PROBLEM — T84.028A: Status: ACTIVE | Noted: 2022-04-27
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
## 2024-06-08 PROBLEM — E11.65 TYPE 2 DIABETES MELLITUS WITH HYPERGLYCEMIA, WITH LONG-TERM CURRENT USE OF INSULIN: Status: ACTIVE | Noted: 2024-06-08

## 2024-06-08 PROBLEM — S02.401A FRACTURE OF MAXILLA: Status: ACTIVE | Noted: 2024-06-08

## 2024-06-08 PROBLEM — M06.9 RHEUMATOID ARTHRITIS: Status: ACTIVE | Noted: 2017-09-07

## 2024-06-08 RX ORDER — INSULIN LISPRO 100 [IU]/ML
100 INJECTION, SOLUTION INTRAVENOUS; SUBCUTANEOUS
Qty: 1 | Refills: 0 | Status: ACTIVE | COMMUNITY
Start: 2024-06-08

## 2024-06-08 RX ORDER — NIFEDIPINE 60 MG/1
60 TABLET, EXTENDED RELEASE ORAL DAILY
Qty: 30 | Refills: 0 | Status: ACTIVE | COMMUNITY
Start: 2024-06-08

## 2024-06-08 RX ORDER — MULTIVIT-MIN/FOLIC/VIT K/LYCOP 400-300MCG
500 TABLET ORAL DAILY
Qty: 30 | Refills: 3 | Status: ACTIVE | COMMUNITY
Start: 2024-06-08

## 2024-06-08 RX ORDER — HYDROCORTISONE 10 MG/G
1 CREAM TOPICAL
Qty: 1 | Refills: 0 | Status: DISCONTINUED | COMMUNITY
Start: 2024-02-21 | End: 2024-06-08

## 2024-06-08 RX ORDER — CALCIUM CARBONATE 160(400)MG
TABLET,CHEWABLE ORAL
Qty: 1 | Refills: 0 | Status: DISCONTINUED | COMMUNITY
Start: 2024-01-19 | End: 2024-06-08

## 2024-06-08 NOTE — PHYSICAL EXAM
[de-identified] : rash on the right upper extremities  [No Acute Distress] : no acute distress [Normal Voice/Communication] : normal voice communication [Normal Sclera/Conjunctiva] : normal sclera/conjunctiva [EOMI] : extra ocular movement intact [Normal Outer Ear/Nose] : the ears and nose were normal in appearance [Normal Oropharynx] : the oropharynx was normal [Normal TMs] : both tympanic membranes were normal [No JVD] : no jugular venous distention [No LAD] : no lymphadenopathy [No Respiratory Distress] : no respiratory distress [Clear to Auscultation] : lungs were clear to auscultation bilaterally [No Accessory Muscle Use] : no accessory muscle use [Normal Rate] : heart rate was normal  [Regular Rhythm] : with a regular rhythm [Normal S1, S2] : normal S1 and S2 [No Murmurs] : no murmurs heard [Breast Exam Declined] : patient declined to have breast exam done [Normal Bowel Sounds] : normal bowel sounds [Non Tender] : non-tender [Soft] : abdomen soft [Not Distended] : not distended [Patient Refused] : rectal exam was refused by the patient [Normal Post Cervical Nodes] : no posterior cervical lymphadenopathy [Normal Anterior Cervical Nodes] : no anterior cervical lymphadenopathy [No CVA Tenderness] : no ~M costovertebral angle tenderness [No Rash] : no rash [Cranial Nerves Intact] : cranial nerves 2-12 were intact [Oriented x3] : oriented to person, place, and time [Over the Past 2 Weeks, Have You Felt Down, Depressed, or Hopeless?] : 1.) Over the past 2 weeks, have you felt down, depressed, or hopeless? No [Over the Past 2 Weeks, Have You Felt Little Interest or Pleasure Doing Things?] : 2.) Over the past 2 weeks, have you felt little interest or pleasure doing things? No [Foot Ulcers] : no foot ulcers [de-identified] : female patient walking with unsteady gait [de-identified] : b/l lower extremities edema +3 [de-identified] : obese [de-identified] : patient refused [de-identified] : unsteady gait,

## 2024-06-08 NOTE — HISTORY OF PRESENT ILLNESS
[PT] : PT [In-Place] : has aide services in-place [House Calls Co-Management Patient] : [unfilled] is a House Calls co-management patient [Patient is stable] : patient is stable [Education provided] : education provided [Patient] : patient [Formal Caregiver] : formal caregiver [LastPCPVisitDate] : 05/24 [FreeTextEntry4] : hematology, ONCOLOGY, CARDIOLOGY, endocrinology, RHEUMATOLOGY [FreeTextEntry1] : Not homebound [FreeTextEntry2] : 06/08/2024 COVID SCREEN: Patient or caretaker denies fever, cough, trouble breathing, rash, vomiting. Patient has not been in close contact with anyone who is COVID-19 positive, or suspected of having COVID-19.  N95 mask, gloves, eye wear and gown (if indicated) used during visit: Yes.  Total face to face time with patient is 45 min.  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  Patient reports recent hospitalization which was non HIE alerts to [facility] 05/06/24 following a fall. Hospital diagnosis closed fracture of right orbital floor and maxilla. Was discharged to [facility] for rehab on 05/08/24. Patient is now discharged home on 06/05/24.  Since hospitalization patient reports back to baseline. Patient denies chest pain, palpitation, discomfort, distress, dizziness, headache and n/v.  Medication reviewed no changes.     Patient/ patient's caregiver reports no weight loss >10 lbs in the past 6 months. No changes in dentition or swallowing reported, No changes in hearing or vision reported. No changes in Cognition reported. Patient denies any symptoms of depression or anxiety. Patient is ADL independent/dependent and IADL independent/dependent. No changes in gait or falls reported.  Patient's home environment is safe.  Goals of care discussed 10min. Full Code with no limitations.

## 2024-06-08 NOTE — COUNSELING
[] : foot exam [Not Recommended] : Aspirin use not recommended due to overall prognosis [Full Code] : Code Status: Full Code [No Limitations] : Treatment Guidelines: No limitations [Long Term Intubation] : Intubation: Long term intubation [Overweight - ( BMI 25.1 - 29.9 )] : overweight -  ( BMI 25.1 - 29.9 ) [DASH diet recommended] : DASH diet recommended [Patient not on disease-modifying anti-rheumatic drug due to overall prognosis] : Patient not on disease-modifying anti-rheumatic drug due to overall prognosis [Completed] : Aspirin use discussion completed [Improve mobility] : improve mobility [Improve pain control] : improve pain control [Decrease hospital use] : decrease hospital use [Minimize unnecessary interventions] : minimize unnecessary interventions [Discussed disease trajectory with patient/caregiver] : discussed disease trajectory with patient/caregiver [Obese (BMI >29.9)] : Obese - BMI >29.9 [DASH diet given] : DASH diet given [Sodium restriction 2gm recommended] : Sodium restriction 2 gm recommended [Non - Smoker] : non-smoker [Use assistive device to avoid falls] : use assistive device to avoid falls [Remove clutter and unsafe carpeting to avoid falls] : remove clutter and unsafe carpeting to avoid falls [Use grab bars] : use grab bars [Smoke/CO Detectors] : smoke/CO detectors [Bone Density] : Bone Density [Mammogram] : Mammogram [Lung Cancer Screening in qualified smokers] : Lung Canser Screening [Colon Cancer Screening] : Colon Cancer Screening [FreeTextEntry3] : renal diet [FreeTextEntry2] : advice need for medical alert

## 2024-06-08 NOTE — REVIEW OF SYSTEMS
[Itching] : no itching [Mole Changes] : no mole changes [Nail Changes] : no nail changes [Hair Changes] : no hair changes [Skin Rash] : skin rash [de-identified] : bruises to face and under b/l eyes s/p fall [Lower Ext Edema] : lower extremity edema [Joint Pain] : joint pain [Unsteady Walking] : ataxia [Negative] : Heme/Lymph [Chest Pain] : no chest pain [Palpitations] : no palpitations [Leg Claudication] : no leg claudication [Orthopnea] : no orthopnea [Paroxysmal Nocturnal Dyspnea] : no paroxysmal nocturnal dyspnea [Joint Stiffness] : no joint stiffness [Joint Swelling] : no joint swelling [Muscle Weakness] : no muscle weakness [Muscle Pain] : no muscle pain [Back Pain] : no back pain [Headache] : no headache [Dizziness] : no dizziness [Fainting] : no fainting [Confusion] : no confusion [Memory Loss] : no memory loss [FreeTextEntry5] : b/l lower extremities edema +3 [FreeTextEntry9] : OA with joint pain [de-identified] : using rollator at home and outside, mobility impairment

## 2024-06-08 NOTE — CURRENT MEDS
[Adherent to medications] : Patient is adherent to medications as prescribed [Adherentt to medications as prescribed] : the patient is adherent to medications as prescribed [Medication and Allergies Reconciled] : medication and allergies reconciled [High Risk Medications Reviewed and Reconciled (Beers Criteria)] : high risk medications reviewed and reconciled [Reviewed patient reported medication adherence from Comprehensive Assessment] : Reviewed patient reported medication adherence from comprehensive assessment [Non adherent to medications] : Patient is non adherent to medications as prescribed

## 2024-06-08 NOTE — HEALTH RISK ASSESSMENT
[TimeGetUpGo] : 4 [de-identified] : use rollator in and outside as assistive device. [Independent] : managing finances [Two or more falls in past year] : Patient reported two or more falls in the past year [Yes] : The patient has visual impairment [HRA Reviewed] : Health risk assessment reviewed [Some assistance needed] : managing finances [Any fall with injury in past year] : Patient reported fall with injury in the past year [FreeTextEntry8] : using rollator in the home and outside [[facility]] : 6 [FreeTextEntry2] : use rollator in and outside home, mobility impairment, multiple falls.

## 2024-06-08 NOTE — REASON FOR VISIT
[Follow-Up] : a follow-up visit [Other: _____] : [unfilled] [FreeTextEntry1] : DM2 on insulin, Right hip joint replacement, herniated disc, HLD, HTN, Lung CA metastatic to brain on chemo therapy, Osteoarthritis, obesity, CKD stage 3, Osteopenia, osteoporosis, Rheumatoid arthritis, venous insufficiency, osteoporosis, UTI   [Formal Caregiver] : formal caregiver [Pre-Visit Preparation] : pre-visit preparation was done [Intercurrent Specialty/Sub-specialty Visits] : the patient has intercurrent specialty/sub-specialty visits [FreeTextEntry2] : chart reviewed [FreeTextEntry3] :     hematology, ONCOLOGY, CARDIOLOGY, endocrinology,

## 2024-06-13 NOTE — ASSESSMENT
[FreeTextEntry1] : 74 yo F with CKD, RA, DM, HTN here for NSCLC with BMs and liver met (STK11 mutant, PIK3CA mutant, TP53 mutant, PD-L1 negative).  NSCLC - BMs s/p SRS, liver met. Received C1 paclitaxel/pembrolizumab on 9/7/2023 at [facility] [place]. This regimen was favored over carbo/pemetrexed/pembrolizumab due to CKD and baseline GFR less than 45. She continued carboplatin/Taxol/pembrolizumab due to CKD Treatment was delayed and held several times because of uncontrolled hyperglycemia --patient very non-compliant with food restrictions Post 4 cycles induction chemoIO showing partial response with CT CAP 2/6/2024 (90% reduction in lung mass) and MRIB 2/23/2024 (s/p SRS, n/e of residual BMs) --start maintenance pembrolizumab 200 mg Q3 wks x 2 years --monitor labs: CBC, CMP, TSH each visit  CKD - current Cr 1.3 --stable  Hyperglycemia --patient was advised to go to ED today, but she is refusing. She stated she ate crab cakes, rise and a lot of carbs last night. She understands that she should go to ED with this glucose level, and polyuria, but she is refusing, understanding there is a risk of significant complications including HHS, DKA, organ failure and death. --continue to follow with endo   Rheumatoid arthritis Being managed by Dr. Johnston   RTC with next Tx with MARIBELL Vee.  3/12/24  onc clinic a/p NSCLC w/mets  Labs : glucose 358 , no anion gap, nl creatinine. wbc 14.98 no ac signs of infection, h/h : 12.5/38.6 , plts 182 Positive for hyperglycemia will give 1 liter of NS  Proceed w/ pembro C1  Cont with current meds: sliding scale for DM , endo f/u Dr. Woodard for DM control cont to check prior to tx and s/s RTC in 3 wks .Encourage to contact the office for any concerns or worsening symptoms. Pt verbalizes understanding  4/2/2024 onc clinic a/p NSCLC , hyperglycemia  Labs: glucose 368 , no gap , normal creatinine  NS 1 liter IVF bolus and insulin 5 units given  Proceed w/ pembro C2 C/w current meds, F/u with endo for better hyperglycemic control c/w checking labs and s/s prior to tx C/w to encourage to take meds regularly and diet sugar control RTC in 3wks .Encourage to contact the office for any concerns or worsening symptoms. Pt verbalizes understanding  5/1/2024 onc clinic a/p NSCLC, hyperglycemia Labs: glucose 395, no gap, normal Cr, no elevated WBC NS 1 liter IVF bolus and insulin 5 units given SQ Proceed w/ pembro C3 Continue with current meds and follow ups with endo  Nutrition referral initiated Continue with checking labs and monitoring s/s prior to tx Pending CAP MRIB  Next visit labs: CBC, CMP, TSH/FT4 RTC in 3 weeks .Encourage to contact the office for any concerns or worsening symptoms. Pt verbalizes understanding

## 2024-06-13 NOTE — HISTORY OF PRESENT ILLNESS
[Disease: _____________________] : Disease: [unfilled] [de-identified] : Noemi Minor is a 73-year-old female with CKD, DM, RA, HTN who presents to the clinic for follow up of NSCLC with BMs.  Onc Hx: Ex-smoker with history of CKD, baseline creatinine is around 2, diabetes, rheumatoid arthritis, HTN. She was experiencing 1 week of daily falls with lower extremity weakness without dizziness, was admitted to [facility]. MRI brain showed 0.9 cm right frontal lobe mass with surrounding vasogenic edema. CT chest abdomen pelvis revealed left upper lobe mass 2.6 x 4.1 cm in posterior aspect of left upper lobe abutting major fissure.  The mass extends to adjacent left hilum and laterally to left lateral pleura.  1.5 cm left lobe of liver lesion.  1.2 cm pancreatic body cystic lesion. 7/14/2023 bronchoscopy-lingular biopsy poorly differentiated adenocarcinoma, positive TTF-1, TMB 17.3, PD-L1 negative. Tier II: Variants of Potential Clinical Significance STK11 p.([id number]) PIK3CA p.([id number]) TP53 p.([id number]) Tier III: Variants of Unknown Clinical Significance ALK p.([id number]) 8/2023: She followed up at [facility] where she received her first chemotherapy cycle, Taxol/pembrolizumab only due to carboplatin shortage. Taxol was given because her labs there showed a GFR<45, excluding her from being able to receive pemetrexed. She tolerated treatment reasonably well, without any neuropathy or cytopenias. She lost he hair after first chemotherapy cycle. 9/5/2023: PET at [facility]: Left upper lobe perihilar hypermetabolic mass consistent with biopsy-proven malignancy.  Mildly FDG avid right thyroid nodule, correlate with thyroid ultrasound. 9/7/2023: Received cycle 1 paclitaxel/pembrolizumab at [facility] (Cr 1.2 there) 10/10/2023: MRIB: Since 7/11/2023, right posterior frontal enhancing lesion and vasogenic edema are completely resolved as a favorable response to therapy. No new enhancing lesion.  11/16/23 onc clinic  74 y/o f with h/o NSCLC w/BM is here for follow up and tx. Patient reports of feeling well. No ac complaints at this time. Patient's tx was held due to hyperglycemia 478, mildly elevated Lfts that were not her baseline and hyponatremia. Patient does report of eating ice cream/ cake the night before and found eating an orange this morning.   11/22/23 onc clinic  74 y/o f with NSCLC w/BM returns to clinic for f/u and tx. Patient states last week she was sent to the ED and her hyperglycemia was medically managed. She was also tx for a UTI. She reports of feeling well and has no new complaints.   12/18/23  onc clinic  74 y/o f with NSCLC with mets. present today for glucose check. Patient reports of feeling frequency in urination. Denies fevers, n,v, abd pain, dysuria or hematuria.   2/6/24: CT CAP:Since July 11, 2023, significantly decreased left upper lobe lesion. No suspicious lymphadenopathy. No new disease in the chest abdomen or pelvis. Stable incidental findings above. 2/23/24: MRI HEAD: No evidence of abnormal enhancement to suggest residual/recurrent metastatic disease. Chronic lacunar infarctions. Small right temporal occipital chronic infarction. Tiny left cerebellar chronic infarction. Microvascular disease.  3/12/24 onc clinic 73-year-old female with CKD, DM, RA, HTN who presents to the clinic for follow up of NSCLC with BMs. Patient present states to feel well and offers no ac complaints. She states of taking her meds this morning and avoided high sugar foods  prior to visit as instructed. Patient is starting adjuvant  tx with pembrolizumab.  4/2/2024 Onc clinic  74 y/o f with uncontrolled DM, CKD, HTN, NSCLC present to clinic for follow and tx. Patient report to feel well and has no ac complaints. She admit of taking her medications regularly and has been trying to avoid high sugar foods.   5/1/2024 Onc clinic 73 y.o. f with uncontrolled DM, HTN, CKD, NSCLC w/ BM presenting for follow up and tx. Post ER visit from last week due to hyperglycemia treatment was held. Pt reports feeling well, admits trying to control diet and taking her medications. Denies dizziness, SOB, CP, diarrhea, or neuropathies. [de-identified] : Adenocarcinoma [de-identified] : Medonc ([facility] [place]): Dr.Rosa Jeffrey NeuroSx: Dr.D'Amico [facility]: Dr. Wernicke [FreeTextEntry1] : 9/7/2023: Taxol/pembrolizumab C1 given at [facility] [place] (no carboplatin due to national shortage) 10/26/2023: C2 carboplatin/Taxol/pembrolizumab 11/16/23 C3 carboplatin/taxol/pembro (held due to hyperglycemia, elevated lfts and hyponatremia) 11/22/23  C3 carboplatin/taxol/pembro 12/13/23 C4 held due to hyperglycemia.  1/9/2024: C4 carboplatin/Taxol/pembrolizumab  3/12/2024 C1 pembro  4/2/2024.  C2 pembro 4/23/2024. C3 pembro No Show due to hyperglyemia. Pt was seen in ED 5/1/2024. C3 pembro [de-identified] : Feeling ok. Offers no complaints. No irAE or any side effect to treatment last week. [100: Normal, no complaints, no evidence of disease.] : 100: Normal, no complaints, no evidence of disease. [ECOG Performance Status: 0 - Fully active, able to carry on all pre-disease performance without restriction] : Performance Status: 0 - Fully active, able to carry on all pre-disease performance without restriction

## 2024-06-14 ENCOUNTER — APPOINTMENT (OUTPATIENT)
Dept: MRI IMAGING | Facility: HOSPITAL | Age: 74
End: 2024-06-14
Payer: MEDICARE

## 2024-06-14 ENCOUNTER — OUTPATIENT (OUTPATIENT)
Dept: OUTPATIENT SERVICES | Facility: HOSPITAL | Age: 74
LOS: 1 days | End: 2024-06-14
Payer: MEDICARE

## 2024-06-14 ENCOUNTER — APPOINTMENT (OUTPATIENT)
Dept: CT IMAGING | Facility: HOSPITAL | Age: 74
End: 2024-06-14

## 2024-06-14 DIAGNOSIS — Z96.641 PRESENCE OF RIGHT ARTIFICIAL HIP JOINT: Chronic | ICD-10-CM

## 2024-06-14 PROCEDURE — 74177 CT ABD & PELVIS W/CONTRAST: CPT

## 2024-06-14 PROCEDURE — 71260 CT THORAX DX C+: CPT | Mod: 26

## 2024-06-14 PROCEDURE — 70553 MRI BRAIN STEM W/O & W/DYE: CPT | Mod: 26

## 2024-06-14 PROCEDURE — 71260 CT THORAX DX C+: CPT

## 2024-06-14 PROCEDURE — 70553 MRI BRAIN STEM W/O & W/DYE: CPT

## 2024-06-14 PROCEDURE — 82565 ASSAY OF CREATININE: CPT

## 2024-06-14 PROCEDURE — 74177 CT ABD & PELVIS W/CONTRAST: CPT | Mod: 26

## 2024-06-19 ENCOUNTER — APPOINTMENT (OUTPATIENT)
Dept: HEMATOLOGY ONCOLOGY | Facility: CLINIC | Age: 74
End: 2024-06-19

## 2024-06-19 ENCOUNTER — APPOINTMENT (OUTPATIENT)
Dept: INFUSION THERAPY | Facility: CLINIC | Age: 74
End: 2024-06-19

## 2024-06-19 NOTE — ED PROVIDER NOTE - MDM ORDERS SUBMITTED SELECTION
What Type Of Note Output Would You Prefer (Optional)?: Bullet Format
Hpi Title: Evaluation of Skin Lesions
How Severe Are Your Spot(S)?: moderate
Have Your Spot(S) Been Treated In The Past?: has not been treated
Labs/EKG/Imaging Studies/Medications

## 2024-06-20 ENCOUNTER — LABORATORY RESULT (OUTPATIENT)
Age: 74
End: 2024-06-20

## 2024-06-20 ENCOUNTER — APPOINTMENT (OUTPATIENT)
Dept: HEMATOLOGY ONCOLOGY | Facility: CLINIC | Age: 74
End: 2024-06-20
Payer: MEDICARE

## 2024-06-20 VITALS
TEMPERATURE: 97.3 F | HEIGHT: 63 IN | WEIGHT: 152 LBS | SYSTOLIC BLOOD PRESSURE: 155 MMHG | BODY MASS INDEX: 26.93 KG/M2 | HEART RATE: 64 BPM | DIASTOLIC BLOOD PRESSURE: 87 MMHG | OXYGEN SATURATION: 97 % | RESPIRATION RATE: 18 BRPM

## 2024-06-20 LAB
ALBUMIN SERPL ELPH-MCNC: 3.5 G/DL
ALP BLD-CCNC: 111 U/L
ALT SERPL-CCNC: 13 U/L
ANION GAP SERPL CALC-SCNC: 10 MMOL/L
AST SERPL-CCNC: 23 U/L
BILIRUB SERPL-MCNC: 0.5 MG/DL
BUN SERPL-MCNC: 37 MG/DL
CALCIUM SERPL-MCNC: 9.9 MG/DL
CHLORIDE SERPL-SCNC: 101 MMOL/L
CO2 SERPL-SCNC: 29 MMOL/L
CREAT SERPL-MCNC: 1.1 MG/DL
EGFR: 53 ML/MIN/1.73M2
GLUCOSE SERPL-MCNC: 151 MG/DL
HCT VFR BLD CALC: 34.9 %
HGB BLD-MCNC: 11.2 G/DL
LYMPHOCYTES # BLD AUTO: 1.2 K/UL
LYMPHOCYTES NFR BLD AUTO: 10.4 %
MAN DIFF?: NO
MCHC RBC-ENTMCNC: 28.3 PG
MCHC RBC-ENTMCNC: 32.1 GM/DL
MCV RBC AUTO: 88.1 FL
NEUTROPHILS # BLD AUTO: 10.5 K/UL
NEUTROPHILS NFR BLD AUTO: 88.4 %
PLATELET # BLD AUTO: 190 K/UL
POTASSIUM SERPL-SCNC: 4.2 MMOL/L
PROT SERPL-MCNC: 7.4 G/DL
RBC # BLD: 3.96 M/UL
RBC # FLD: 13.9 %
SODIUM SERPL-SCNC: 140 MMOL/L
WBC # FLD AUTO: 11.8 K/UL

## 2024-06-20 PROCEDURE — G2211 COMPLEX E/M VISIT ADD ON: CPT

## 2024-06-20 PROCEDURE — 99215 OFFICE O/P EST HI 40 MIN: CPT

## 2024-06-20 NOTE — ASSESSMENT
[FreeTextEntry1] : 74 yo F with CKD, RA, DM, HTN here for NSCLC with BMs and liver met (STK11 mutant, PIK3CA mutant, TP53 mutant, PD-L1 negative).  NSCLC - BMs s/p SRS, liver met. Received C1 paclitaxel/pembrolizumab on 9/7/2023 at [facility] [place]. This regimen was favored over carbo/pemetrexed/pembrolizumab due to CKD and baseline GFR less than 45. She continued carboplatin/Taxol/pembrolizumab due to CKD Treatment was delayed and held several times because of uncontrolled hyperglycemia --patient very non-compliant with food restrictions Post 4 cycles induction chemoIO showing partial response with CT CAP 2/6/2024 (90% reduction in lung mass) and MRIB 2/23/2024 (s/p SRS, n/e of residual BMs) Now (June 2024) she has progression of disease in the body and brain on maintenance pembrolizumab. Given her creatinine clearance of 41 she is not a candidate for targeted therapy unless her creatinine improves. -- We discussed today Treatment options, ideally she should get Alimta, however given her creatinine clearance I worry about significant cytopenias, also Alimta is contraindicated in patients with creatinine clearance of less than 45. Other options include single agent gemcitabine with therapy, given on day 1 and day 8 of each 21-day cycle. Discussed risks, benefits and treatment schedule today.  Risks include but are not limited to profound cytopenias, hair loss, rashes, constipation or diarrhea, abdominal pain.  Informed consent signed.  Educational materials provided.  Brain mets --refer to Drs.D'Amico and Wernicke for SRS to new lesions  CKD - current Cr 1.3 --stable   Rheumatoid arthritis Being managed by Dr. Johnston  RTC next week with MARIBELL Vee for first tx. Can use labs from today for tx next week. Labs subsequent visits: CBC, CMP, TSH

## 2024-06-20 NOTE — HISTORY OF PRESENT ILLNESS
[Disease: _____________________] : Disease: [unfilled] [100: Normal, no complaints, no evidence of disease.] : 100: Normal, no complaints, no evidence of disease. [ECOG Performance Status: 0 - Fully active, able to carry on all pre-disease performance without restriction] : Performance Status: 0 - Fully active, able to carry on all pre-disease performance without restriction [de-identified] : Noemi Minor is a 73-year-old female with CKD, DM, RA, HTN who presents to the clinic for follow up of NSCLC with BMs (PDL1 negative, STK11, PIK3CA, TP53).  Onc Hx: Ex-smoker with history of CKD, baseline creatinine is around 2, diabetes, rheumatoid arthritis, HTN. She was experiencing 1 week of daily falls with lower extremity weakness without dizziness, was admitted to [facility]. MRI brain showed 0.9 cm right frontal lobe mass with surrounding vasogenic edema. CT chest abdomen pelvis revealed left upper lobe mass 2.6 x 4.1 cm in posterior aspect of left upper lobe abutting major fissure.  The mass extends to adjacent left hilum and laterally to left lateral pleura.  1.5 cm left lobe of liver lesion.  1.2 cm pancreatic body cystic lesion. 7/14/2023 bronchoscopy-lingular biopsy poorly differentiated adenocarcinoma, positive TTF-1, TMB 17.3, PD-L1 negative. Tier II: Variants of Potential Clinical Significance STK11 p.([id number]) PIK3CA p.([id number]) TP53 p.([id number]) Tier III: Variants of Unknown Clinical Significance ALK p.([id number]) 8/2023: She [place] at [facility] [place] where she received her first chemotherapy cycle, Taxol/pembrolizumab only due to carboplatin shortage. Taxol was given because her labs there showed a GFR<45, excluding her from being able to receive pemetrexed. She tolerated treatment reasonably well, without any neuropathy or cytopenias. She lost he hair after first chemotherapy cycle. 9/5/2023: PET at [facility] [place]: Left upper lobe perihilar hypermetabolic mass consistent with biopsy-proven malignancy.  Mildly FDG avid right thyroid nodule, correlate with thyroid ultrasound. 9/7/2023: Received cycle 1 paclitaxel/pembrolizumab at [facility] [place] (Cr 1.2 there) 10/10/2023: MRIB: Since 7/11/2023, right posterior frontal enhancing lesion and vasogenic edema are completely resolved as a favorable response to therapy. No new enhancing lesion.  2/6/24: CT CAP:Since July 11, 2023, significantly decreased left upper lobe lesion. No suspicious lymphadenopathy. No new disease in the chest abdomen or pelvis. Stable incidental findings above. 2/23/24: MRI HEAD: No evidence of abnormal enhancement to suggest residual/recurrent metastatic disease. Chronic lacunar infarctions. Small right temporal occipital chronic infarction. Tiny left cerebellar chronic infarction. Microvascular disease.  6/14/2024: CT CAP: Interval increase in left upper lobe pulmonary lesion. Suggestion of increase in left hepatic lesion, likely metastatic. Suggestion of increased soft tissue density and enlargement of cervix, not well visualized on this exam. New fluid collection in the subcutaneous tissues of the left gluteal region may represent an evolving hematoma from patient's recent fall in May 2024.  MRIB: Peripherally enhancing lesions are identified as described above. These findings are likely compatible with metastasis (progression of patient's underlying disease process) given patient's history. [de-identified] : Adenocarcinoma - STK11, PIK3CA, TP53, VUS in ALK, PDL1 negative [de-identified] : Medonc ([facility] [place] Dr.Rosa Jeffrey NeuroSx: Dr.D'Amico [facility]: Dr. Wernicke [FreeTextEntry1] : 9/7/2023: Taxol/pembrolizumab C1 given at [facility] [place] (no carboplatin due to national shortage) 10/26/2023: C2 carboplatin/Taxol/pembrolizumab 11/16/23 C3 carboplatin/taxol/pembro (held due to hyperglycemia, elevated lfts and hyponatremia) 11/22/23  C3 carboplatin/taxol/pembro 12/13/23 C4 held due to hyperglycemia.  1/9/2024: C4 carboplatin/Taxol/pembrolizumab  3/12/2024 C1 pembro  4/2/2024.  C2 pembro 4/23/2024. C3 pembro No Show due to hyperglyemia. Pt was seen in ED 5/1/2024. C3 pembro 6/6/2024: C4 pembro (delayed due to fall and rehab placement post hospitalization) [de-identified] : Patient had a complicated course with persistent hyperglycemia and 1 hospitalization from the fall. Despite this her most recent scans are showing evident progression of disease.

## 2024-06-23 PROBLEM — C34.90 LUNG CANCER METASTATIC TO BRAIN: Status: ACTIVE | Noted: 2023-08-29

## 2024-06-24 ENCOUNTER — APPOINTMENT (OUTPATIENT)
Dept: NEUROSURGERY | Facility: CLINIC | Age: 74
End: 2024-06-24
Payer: MEDICARE

## 2024-06-24 DIAGNOSIS — C79.31 MALIGNANT NEOPLASM OF UNSPECIFIED PART OF UNSPECIFIED BRONCHUS OR LUNG: ICD-10-CM

## 2024-06-24 DIAGNOSIS — C34.90 MALIGNANT NEOPLASM OF UNSPECIFIED PART OF UNSPECIFIED BRONCHUS OR LUNG: ICD-10-CM

## 2024-06-24 LAB
MAGNESIUM SERPL-MCNC: 1.7 MG/DL
TSH SERPL-ACNC: 0.16 UIU/ML

## 2024-06-24 PROCEDURE — 99205 OFFICE O/P NEW HI 60 MIN: CPT

## 2024-06-24 RX ORDER — PEGFILGRASTIM-CBQV 6 MG/.6ML
6 INJECTION, SOLUTION SUBCUTANEOUS ONCE
Refills: 0 | Status: COMPLETED | OUTPATIENT
Start: 2024-08-29 | End: 2024-08-29

## 2024-07-01 ENCOUNTER — APPOINTMENT (OUTPATIENT)
Dept: RADIATION ONCOLOGY | Facility: CLINIC | Age: 74
End: 2024-07-01
Payer: MEDICARE

## 2024-07-01 PROCEDURE — 99214 OFFICE O/P EST MOD 30 MIN: CPT

## 2024-07-01 PROCEDURE — 99215 OFFICE O/P EST HI 40 MIN: CPT | Mod: 95

## 2024-07-03 ENCOUNTER — APPOINTMENT (OUTPATIENT)
Dept: ENDOCRINOLOGY | Facility: CLINIC | Age: 74
End: 2024-07-03
Payer: MEDICARE

## 2024-07-03 VITALS
DIASTOLIC BLOOD PRESSURE: 74 MMHG | SYSTOLIC BLOOD PRESSURE: 133 MMHG | HEART RATE: 51 BPM | BODY MASS INDEX: 26.22 KG/M2 | WEIGHT: 148 LBS

## 2024-07-03 LAB
GLUCOSE BLDC GLUCOMTR-MCNC: 431
HBA1C MFR BLD HPLC: 9

## 2024-07-03 PROCEDURE — G2211 COMPLEX E/M VISIT ADD ON: CPT | Mod: NC

## 2024-07-03 PROCEDURE — 99214 OFFICE O/P EST MOD 30 MIN: CPT | Mod: 25

## 2024-07-03 PROCEDURE — 83036 HEMOGLOBIN GLYCOSYLATED A1C: CPT | Mod: QW

## 2024-07-03 RX ORDER — PEN NEEDLE, DIABETIC 29 G X1/2"
32G X 4 MM NEEDLE, DISPOSABLE MISCELLANEOUS
Qty: 400 | Refills: 3 | Status: ACTIVE | COMMUNITY
Start: 2024-07-03 | End: 1900-01-01

## 2024-07-03 NOTE — PATIENT PROFILE ADULT - NSPRESCRALCSCORE_GEN_A_NUR_CAL
----- Message from [facility] sent at 7/3/2024  7:49 AM CDT -----  Pap is normal, repeat in 2027   1

## 2024-07-10 ENCOUNTER — APPOINTMENT (OUTPATIENT)
Dept: HEMATOLOGY ONCOLOGY | Facility: CLINIC | Age: 74
End: 2024-07-10

## 2024-07-11 ENCOUNTER — LABORATORY RESULT (OUTPATIENT)
Age: 74
End: 2024-07-11

## 2024-07-11 ENCOUNTER — OUTPATIENT (OUTPATIENT)
Dept: OUTPATIENT SERVICES | Facility: HOSPITAL | Age: 74
LOS: 1 days | End: 2024-07-11
Payer: MEDICARE

## 2024-07-11 ENCOUNTER — APPOINTMENT (OUTPATIENT)
Dept: HEMATOLOGY ONCOLOGY | Facility: CLINIC | Age: 74
End: 2024-07-11
Payer: MEDICARE

## 2024-07-11 ENCOUNTER — APPOINTMENT (OUTPATIENT)
Dept: INFUSION THERAPY | Facility: CLINIC | Age: 74
End: 2024-07-11

## 2024-07-11 VITALS
RESPIRATION RATE: 18 BRPM | BODY MASS INDEX: 25.16 KG/M2 | HEIGHT: 63 IN | OXYGEN SATURATION: 99 % | WEIGHT: 142 LBS | TEMPERATURE: 98.2 F | DIASTOLIC BLOOD PRESSURE: 82 MMHG | HEART RATE: 59 BPM | SYSTOLIC BLOOD PRESSURE: 142 MMHG

## 2024-07-11 VITALS
TEMPERATURE: 97 F | HEART RATE: 62 BPM | WEIGHT: 141.98 LBS | DIASTOLIC BLOOD PRESSURE: 76 MMHG | OXYGEN SATURATION: 96 % | HEIGHT: 63 IN | RESPIRATION RATE: 16 BRPM | SYSTOLIC BLOOD PRESSURE: 151 MMHG

## 2024-07-11 DIAGNOSIS — N18.4 CHRONIC KIDNEY DISEASE, STAGE 4 (SEVERE): ICD-10-CM

## 2024-07-11 DIAGNOSIS — C79.31 MALIGNANT NEOPLASM OF UNSPECIFIED PART OF UNSPECIFIED BRONCHUS OR LUNG: ICD-10-CM

## 2024-07-11 DIAGNOSIS — Z79.4 TYPE 2 DIABETES MELLITUS WITH HYPERGLYCEMIA: ICD-10-CM

## 2024-07-11 DIAGNOSIS — C34.90 MALIGNANT NEOPLASM OF UNSPECIFIED PART OF UNSPECIFIED BRONCHUS OR LUNG: ICD-10-CM

## 2024-07-11 DIAGNOSIS — Z96.641 PRESENCE OF RIGHT ARTIFICIAL HIP JOINT: Chronic | ICD-10-CM

## 2024-07-11 DIAGNOSIS — E11.65 TYPE 2 DIABETES MELLITUS WITH HYPERGLYCEMIA: ICD-10-CM

## 2024-07-11 LAB
ALBUMIN SERPL ELPH-MCNC: 3.3 G/DL
ALP BLD-CCNC: 160 U/L
ALT SERPL-CCNC: 19 U/L
ANION GAP SERPL CALC-SCNC: 10 MMOL/L
BILIRUB SERPL-MCNC: 0.6 MG/DL
BUN SERPL-MCNC: 43 MG/DL
CALCIUM SERPL-MCNC: 10 MG/DL
CHLORIDE SERPL-SCNC: 96 MMOL/L
CO2 SERPL-SCNC: 29 MMOL/L
CREAT SERPL-MCNC: 1.3 MG/DL
GLUCOSE SERPL-MCNC: 395 MG/DL
HCT VFR BLD CALC: 38.7 %
HGB BLD-MCNC: 12.8 G/DL
LYMPHOCYTES # BLD AUTO: 1 K/UL
MAN DIFF?: NO
MCHC RBC-ENTMCNC: 28 PG
MCHC RBC-ENTMCNC: 33.1 GM/DL
MCV RBC AUTO: 84.7 FL
NEUTROPHILS # BLD AUTO: 13.1 K/UL
NEUTROPHILS NFR BLD AUTO: 90.4 %
PLATELET # BLD AUTO: 184 K/UL
POTASSIUM SERPL-SCNC: 4.6 MMOL/L
PROT SERPL-MCNC: 6.8 G/DL
RBC # BLD: 4.57 M/UL
SODIUM SERPL-SCNC: 135 MMOL/L
WBC # FLD AUTO: 14.5 K/UL

## 2024-07-11 PROCEDURE — 36415 COLL VENOUS BLD VENIPUNCTURE: CPT

## 2024-07-11 PROCEDURE — G2211 COMPLEX E/M VISIT ADD ON: CPT

## 2024-07-11 PROCEDURE — 96372 THER/PROPH/DIAG INJ SC/IM: CPT

## 2024-07-11 PROCEDURE — 96413 CHEMO IV INFUSION 1 HR: CPT

## 2024-07-11 PROCEDURE — 96375 TX/PRO/DX INJ NEW DRUG ADDON: CPT

## 2024-07-11 PROCEDURE — 82962 GLUCOSE BLOOD TEST: CPT

## 2024-07-11 PROCEDURE — 99215 OFFICE O/P EST HI 40 MIN: CPT

## 2024-07-11 RX ORDER — LIDOCAINE HCL 28 MG/G
1 GEL TOPICAL ONCE
Refills: 0 | Status: COMPLETED | OUTPATIENT
Start: 2024-07-11 | End: 2024-07-11

## 2024-07-11 RX ORDER — INSULIN LISPRO 100 [IU]/ML
5 INJECTION, SOLUTION SUBCUTANEOUS ONCE
Refills: 0 | Status: COMPLETED | OUTPATIENT
Start: 2024-07-11 | End: 2024-07-11

## 2024-07-11 RX ORDER — PALONOSETRON HYDROCHLORIDE 0.25 MG/5ML
0.25 INJECTION, SOLUTION INTRAVENOUS ONCE
Refills: 0 | Status: COMPLETED | OUTPATIENT
Start: 2024-07-11 | End: 2024-07-11

## 2024-07-11 RX ORDER — GEMCITABINE 10 MG/ML
1700 INJECTION, SOLUTION INTRAVENOUS ONCE
Refills: 0 | Status: COMPLETED | OUTPATIENT
Start: 2024-07-11 | End: 2024-07-11

## 2024-07-11 RX ORDER — SODIUM CHLORIDE 0.9 % (FLUSH) 0.9 %
1000 SYRINGE (ML) INJECTION ONCE
Refills: 0 | Status: COMPLETED | OUTPATIENT
Start: 2024-07-11 | End: 2024-07-11

## 2024-07-11 RX ORDER — SODIUM CHLORIDE 0.9 % (FLUSH) 0.9 %
10 SYRINGE (ML) INJECTION ONCE
Refills: 0 | Status: COMPLETED | OUTPATIENT
Start: 2024-07-11 | End: 2024-07-11

## 2024-07-11 RX ADMIN — GEMCITABINE 1700 MILLIGRAM(S): 10 INJECTION, SOLUTION INTRAVENOUS at 17:56

## 2024-07-11 RX ADMIN — Medication 10 MILLILITER(S): at 18:00

## 2024-07-11 RX ADMIN — Medication 1000 MILLILITER(S): at 17:18

## 2024-07-11 RX ADMIN — Medication 1000 MILLILITER(S): at 16:19

## 2024-07-11 RX ADMIN — INSULIN LISPRO 5 UNIT(S): 100 INJECTION, SOLUTION SUBCUTANEOUS at 16:33

## 2024-07-11 RX ADMIN — GEMCITABINE 1700 MILLIGRAM(S): 10 INJECTION, SOLUTION INTRAVENOUS at 17:24

## 2024-07-11 RX ADMIN — PALONOSETRON HYDROCHLORIDE 0.25 MILLIGRAM(S): 0.25 INJECTION, SOLUTION INTRAVENOUS at 17:15

## 2024-07-12 LAB — GLUCOSE BLDC GLUCOMTR-MCNC: 418 MG/DL — HIGH (ref 70–99)

## 2024-07-15 LAB — TSH SERPL-ACNC: 0.21 UIU/ML

## 2024-07-18 ENCOUNTER — APPOINTMENT (OUTPATIENT)
Dept: HEMATOLOGY ONCOLOGY | Facility: CLINIC | Age: 74
End: 2024-07-18

## 2024-07-23 ENCOUNTER — APPOINTMENT (OUTPATIENT)
Dept: NEUROSURGERY | Facility: CLINIC | Age: 74
End: 2024-07-23
Payer: MEDICARE

## 2024-07-23 ENCOUNTER — NON-APPOINTMENT (OUTPATIENT)
Age: 74
End: 2024-07-23

## 2024-07-23 PROCEDURE — 61796 SRS CRANIAL LESION SIMPLE: CPT

## 2024-07-23 PROCEDURE — 61797 SRS CRAN LES SIMPLE ADDL: CPT

## 2024-07-23 NOTE — HISTORY OF PRESENT ILLNESS
[FreeTextEntry1] : 75 y/o female, former smoker, with PMHx of CKD, DM, RA, HTN, metastatic NSCLC who completed RIGHT brain RT of 2700cGy in 3Fx from 7/20/2023 - 8/15/2023. Her recent MRI showed two new peripherally enhancing lesions consistent with new brain metastases. She now presents for further radiation to the brain. She is referred by Dr. Delaney due to recent brain MRI with peripherally enhancing lesions c/w brain metastasis. Peripherally enhancing lesion right parietal cortex 1.0 x 0.9 cm & left parietal cortex measures approximately 0.7 cm.   She received 4 cycles of carboplatin/Taxol/pembrolizumab from 9/7/23 - 1/9/2024. She then received 3 cycles of pembro from 3/12/2024 - 5/1/2024 (6/6/24 cycle 4 delayed due to fall and rehab placement post hospitalization).   7/23/2024 - OTV   7/1/2024 - She presents today via telehealth for consultation of new brain mets. She states her only symptom which she did not know was a symptom was she has had frequent falls recently. She is a Left handed lady, denies dizziness, dv/bv, weakness or numbness/tingling. She has headaches which are becoming more frequent. She denies headache today.    06/14/2024 - MR BRAIN  INTERPRETATION:   Clinical indication: Non-small cell lung cancer. Restaging unremarkable MRI of the brain was performed sagittal T1 axial T1 and T2 T2 FLAIR diffusion and susceptibility sequence. The patient was injected with approximately 6 cc gadavist IV with 1.5 cc of contrast discarded. Sagittal coronal and axial T1 weighted sequence was performed   This exam is compared prior contrast enhanced brain MRI performed on February 23, 2024   Abnormal lesions involving the posterior fossa and supratentorial region are seen.   Stable tiny nonenhancing cystic area is seen involving the left cerebellar region.   Parenchymal volume loss and chronic microvessel ischemic changes again seen.   Peripherally enhancing lesion is seen involving the right parietal cortex with surrounding edema. This is best seen on series 802 image 43 measures approximately 1.0 x 0.9 cm.   Peripherally enhancing lesion seen involving the left parietal cortex with surrounding edema. This best seen on series 2 image 48 and measures approximately 0.7 cm.   No acute hemorrhage seen   No significant shift or herniation.   Evaluation of the diffusion weighted sequence demonstrates no abnormal areas of restricted diffusion to suggest acute infarct.   The large vessels demonstrate normal flow voids.   The visualized paranasal sinuses mastoid and middle ear regions appear clear.   Enlargement of both globes are identified. Please correlate clinically.    IMPRESSION: Peripherally enhancing lesions are identified as described above. These findings are likely compatible with metastasis (progression of patient's underlying disease process) given patient's history.      06/14/2024 - CT CHEST, ABDOMEN AND PELVIS  FINDINGS: CHEST:   LUNGS AND LARGE AIRWAYS: Patent central airways. Interval increase in left upper lobe lesion abutting the major fissure (5, 123), now measuring 3.2 x 1.7 cm, previously 1.8 x 0.5 cm.   PLEURA: No pleural effusion.  ABDOMEN AND PELVIS:   LIVER: Left hepatic lesion appears increased now measuring 2.7 x 3.0 cm (3, 53), previously 2.5 x 1.8 cm, when remeasured.  PANCREAS: 1.2 cm cystic lesion in the body, unchanged.   IMPRESSION:   Interval increase in left upper lobe pulmonary lesion.   Suggestion of increase in left hepatic lesion, likely metastatic.   Suggestion of increased soft tissue density and enlargement of cervix, not well visualized on this exam.   No fluid collection in the subcutaneous tissues of the left gluteal region may represent an evolving hematoma from patient's recent fall in May 2024.     ________________________________________ ONCOLOGIC HISTORY: 73F with PMHx of HTN HLD, DM, R hip replacement, RA, CKD (Cr baseline ~2) presents to the  ED for unsteadiness and dizziness x1 week, underwent stroke workup with imaging findings negative for acute CVA however with incidental finding of 0.9 cm right frontal lobe brain mass suspicious for metastasis.  Body imaging showed a 2.6 x 4.1 cm somewhat spiculated mass in the EM lung.  8/15/23 - OTV - Ms. Minor has completed 27 Gy / 27 Gy to the right brain.  She feels well.  No complaints.  Return for PTE.

## 2024-07-23 NOTE — REASON FOR VISIT
[Consideration for Non-Curative Therapy] : consideration for non-curative therapy for [Brain Metastasis] : brain metastasis [Home] : at home, [unfilled] , at the time of the visit. [Medical Office: ([facility])___] : at the medical office located in  [Patient] : the patient - - -

## 2024-07-25 ENCOUNTER — NON-APPOINTMENT (OUTPATIENT)
Age: 74
End: 2024-07-25

## 2024-07-25 VITALS
OXYGEN SATURATION: 100 % | WEIGHT: 142.3 LBS | HEART RATE: 64 BPM | RESPIRATION RATE: 20 BRPM | SYSTOLIC BLOOD PRESSURE: 179 MMHG | BODY MASS INDEX: 25.21 KG/M2 | DIASTOLIC BLOOD PRESSURE: 82 MMHG | TEMPERATURE: 98 F

## 2024-07-25 NOTE — DISEASE MANAGEMENT
[TTNM] : x [NTNM] : x [MTNM] : 1 [de-identified] : 1800cGy [de-identified] : Brain [de-identified] : 2702cGy

## 2024-07-25 NOTE — HISTORY OF PRESENT ILLNESS
[FreeTextEntry1] : 75 y/o female, former smoker, with PMHx of CKD, DM, RA, HTN, metastatic NSCLC who completed RIGHT brain RT of 2700cGy in 3Fx from 7/20/2023 - 8/15/2023. Her recent MRI showed two new peripherally enhancing lesions consistent with new brain metastases. She now presents for further radiation to the brain. She is referred by Dr. Delaney due to recent brain MRI with peripherally enhancing lesions c/w brain metastasis. Peripherally enhancing lesion right parietal cortex 1.0 x 0.9 cm & left parietal cortex measures approximately 0.7 cm.   She received 4 cycles of carboplatin/Taxol/pembrolizumab from 9/7/23 - 1/9/2024. She then received 3 cycles of pembro from 3/12/2024 - 5/1/2024 (6/6/24 cycle 4 delayed due to fall and rehab placement post hospitalization).   7/23/2024 - OTV- Ms. Minor has completed 2/3 Fx or 1800/2700cGy radiaiton to brain. Overall she is doing well. She denies any headaches, lethargy or weakness. Appetite and energy level ifair. She ambulates with walker. She will continue her planned RT treatments.  7/1/2024 - She presents today via telehealth for consultation of new brain mets. She states her only symptom which she did not know was a symptom was she has had frequent falls recently. She is a Left handed lady, denies dizziness, dv/bv, weakness or numbness/tingling. She has headaches which are becoming more frequent. She denies headache today.    06/14/2024 - MR BRAIN  INTERPRETATION:   Clinical indication: Non-small cell lung cancer. Restaging unremarkable MRI of the brain was performed sagittal T1 axial T1 and T2 T2 FLAIR diffusion and susceptibility sequence. The patient was injected with approximately 6 cc gadavist IV with 1.5 cc of contrast discarded. Sagittal coronal and axial T1 weighted sequence was performed   This exam is compared prior contrast enhanced brain MRI performed on February 23, 2024   Abnormal lesions involving the posterior fossa and supratentorial region are seen.   Stable tiny nonenhancing cystic area is seen involving the left cerebellar region.   Parenchymal volume loss and chronic microvessel ischemic changes again seen.   Peripherally enhancing lesion is seen involving the right parietal cortex with surrounding edema. This is best seen on series 802 image 43 measures approximately 1.0 x 0.9 cm.   Peripherally enhancing lesion seen involving the left parietal cortex with surrounding edema. This best seen on series 2 image 48 and measures approximately 0.7 cm.   No acute hemorrhage seen   No significant shift or herniation.   Evaluation of the diffusion weighted sequence demonstrates no abnormal areas of restricted diffusion to suggest acute infarct.   The large vessels demonstrate normal flow voids.   The visualized paranasal sinuses mastoid and middle ear regions appear clear.   Enlargement of both globes are identified. Please correlate clinically.    IMPRESSION: Peripherally enhancing lesions are identified as described above. These findings are likely compatible with metastasis (progression of patient's underlying disease process) given patient's history.      06/14/2024 - CT CHEST, ABDOMEN AND PELVIS  FINDINGS: CHEST:   LUNGS AND LARGE AIRWAYS: Patent central airways. Interval increase in left upper lobe lesion abutting the major fissure (5, 123), now measuring 3.2 x 1.7 cm, previously 1.8 x 0.5 cm.   PLEURA: No pleural effusion.  ABDOMEN AND PELVIS:   LIVER: Left hepatic lesion appears increased now measuring 2.7 x 3.0 cm (3, 53), previously 2.5 x 1.8 cm, when remeasured.  PANCREAS: 1.2 cm cystic lesion in the body, unchanged.   IMPRESSION:   Interval increase in left upper lobe pulmonary lesion.   Suggestion of increase in left hepatic lesion, likely metastatic.   Suggestion of increased soft tissue density and enlargement of cervix, not well visualized on this exam.   No fluid collection in the subcutaneous tissues of the left gluteal region may represent an evolving hematoma from patient's recent fall in May 2024.     ________________________________________ ONCOLOGIC HISTORY: 73F with PMHx of HTN HLD, DM, R hip replacement, RA, CKD (Cr baseline ~2) presents to the  ED for unsteadiness and dizziness x1 week, underwent stroke workup with imaging findings negative for acute CVA however with incidental finding of 0.9 cm right frontal lobe brain mass suspicious for metastasis.  Body imaging showed a 2.6 x 4.1 cm somewhat spiculated mass in the EM lung.  8/15/23 - LISA - Ms. Walker has completed 27 Gy / 27 Gy to the right brain.  She feels well.  No complaints.  Return for PTE.

## 2024-07-29 ENCOUNTER — INPATIENT (INPATIENT)
Facility: HOSPITAL | Age: 74
LOS: 2 days | Discharge: HOME | End: 2024-08-01
Attending: STUDENT IN AN ORGANIZED HEALTH CARE EDUCATION/TRAINING PROGRAM | Admitting: STUDENT IN AN ORGANIZED HEALTH CARE EDUCATION/TRAINING PROGRAM
Payer: MEDICARE

## 2024-07-29 VITALS
SYSTOLIC BLOOD PRESSURE: 131 MMHG | HEART RATE: 108 BPM | OXYGEN SATURATION: 98 % | TEMPERATURE: 98 F | WEIGHT: 134.92 LBS | HEIGHT: 63 IN | DIASTOLIC BLOOD PRESSURE: 85 MMHG | RESPIRATION RATE: 18 BRPM

## 2024-07-29 LAB
ADD ON TEST-SPECIMEN IN LAB: SIGNIFICANT CHANGE UP
ALBUMIN SERPL ELPH-MCNC: 3.5 G/DL — SIGNIFICANT CHANGE UP (ref 3.3–5)
ALP SERPL-CCNC: 213 U/L — HIGH (ref 40–120)
ALT FLD-CCNC: 16 U/L — SIGNIFICANT CHANGE UP (ref 10–45)
ANION GAP SERPL CALC-SCNC: 12 MMOL/L — SIGNIFICANT CHANGE UP (ref 5–17)
ANISOCYTOSIS BLD QL: SLIGHT — SIGNIFICANT CHANGE UP
APPEARANCE UR: ABNORMAL
APTT BLD: 25.4 SEC — SIGNIFICANT CHANGE UP (ref 24.5–35.6)
AST SERPL-CCNC: 19 U/L — SIGNIFICANT CHANGE UP (ref 10–40)
B-OH-BUTYR SERPL-SCNC: 0.5 MMOL/L — HIGH
BACTERIA # UR AUTO: ABNORMAL /HPF
BASE EXCESS BLDV CALC-SCNC: 1.5 MMOL/L — SIGNIFICANT CHANGE UP (ref -2–3)
BASOPHILS # BLD AUTO: 0.06 K/UL — SIGNIFICANT CHANGE UP (ref 0–0.2)
BASOPHILS NFR BLD AUTO: 0.9 % — SIGNIFICANT CHANGE UP (ref 0–2)
BILIRUB SERPL-MCNC: 0.3 MG/DL — SIGNIFICANT CHANGE UP (ref 0.2–1.2)
BILIRUB UR-MCNC: NEGATIVE — SIGNIFICANT CHANGE UP
BUN SERPL-MCNC: 30 MG/DL — HIGH (ref 7–23)
CA-I SERPL-SCNC: 1.16 MMOL/L — SIGNIFICANT CHANGE UP (ref 1.15–1.33)
CALCIUM SERPL-MCNC: 9.4 MG/DL — SIGNIFICANT CHANGE UP (ref 8.4–10.5)
CAST: 0 /LPF — SIGNIFICANT CHANGE UP (ref 0–4)
CHLORIDE SERPL-SCNC: 94 MMOL/L — LOW (ref 96–108)
CO2 BLDV-SCNC: 29.2 MMOL/L — HIGH (ref 22–26)
CO2 SERPL-SCNC: 25 MMOL/L — SIGNIFICANT CHANGE UP (ref 22–31)
COLOR SPEC: YELLOW — SIGNIFICANT CHANGE UP
CREAT SERPL-MCNC: 1.38 MG/DL — HIGH (ref 0.5–1.3)
DIFF PNL FLD: ABNORMAL
EGFR: 40 ML/MIN/1.73M2 — LOW
EOSINOPHIL # BLD AUTO: 0 K/UL — SIGNIFICANT CHANGE UP (ref 0–0.5)
EOSINOPHIL NFR BLD AUTO: 0 % — SIGNIFICANT CHANGE UP (ref 0–6)
GAS PNL BLDV: 127 MMOL/L — LOW (ref 136–145)
GAS PNL BLDV: SIGNIFICANT CHANGE UP
GIANT PLATELETS BLD QL SMEAR: PRESENT — SIGNIFICANT CHANGE UP
GLUCOSE SERPL-MCNC: 716 MG/DL — CRITICAL HIGH (ref 70–99)
GLUCOSE UR QL: >=1000 MG/DL
HCO3 BLDV-SCNC: 28 MMOL/L — SIGNIFICANT CHANGE UP (ref 22–29)
HCT VFR BLD CALC: 39.6 % — SIGNIFICANT CHANGE UP (ref 34.5–45)
HGB BLD-MCNC: 12.9 G/DL — SIGNIFICANT CHANGE UP (ref 11.5–15.5)
INR BLD: 0.96 — SIGNIFICANT CHANGE UP (ref 0.85–1.18)
KETONES UR-MCNC: ABNORMAL MG/DL
LEUKOCYTE ESTERASE UR-ACNC: ABNORMAL
LYMPHOCYTES # BLD AUTO: 0.98 K/UL — LOW (ref 1–3.3)
LYMPHOCYTES # BLD AUTO: 15.7 % — SIGNIFICANT CHANGE UP (ref 13–44)
MANUAL SMEAR VERIFICATION: SIGNIFICANT CHANGE UP
MCHC RBC-ENTMCNC: 28 PG — SIGNIFICANT CHANGE UP (ref 27–34)
MCHC RBC-ENTMCNC: 32.6 GM/DL — SIGNIFICANT CHANGE UP (ref 32–36)
MCV RBC AUTO: 85.9 FL — SIGNIFICANT CHANGE UP (ref 80–100)
METAMYELOCYTES # FLD: 1.7 % — HIGH (ref 0–0)
MICROCYTES BLD QL: SLIGHT — SIGNIFICANT CHANGE UP
MONOCYTES # BLD AUTO: 0.65 K/UL — SIGNIFICANT CHANGE UP (ref 0–0.9)
MONOCYTES NFR BLD AUTO: 10.4 % — SIGNIFICANT CHANGE UP (ref 2–14)
MYELOCYTES NFR BLD: 0.9 % — HIGH (ref 0–0)
NEUTROPHILS # BLD AUTO: 4.38 K/UL — SIGNIFICANT CHANGE UP (ref 1.8–7.4)
NEUTROPHILS NFR BLD AUTO: 70.4 % — SIGNIFICANT CHANGE UP (ref 43–77)
NITRITE UR-MCNC: NEGATIVE — SIGNIFICANT CHANGE UP
OVALOCYTES BLD QL SMEAR: SLIGHT — SIGNIFICANT CHANGE UP
PCO2 BLDV: 49 MMHG — HIGH (ref 39–42)
PH BLDV: 7.36 — SIGNIFICANT CHANGE UP (ref 7.32–7.43)
PH UR: 6.5 — SIGNIFICANT CHANGE UP (ref 5–8)
PLAT MORPH BLD: NORMAL — SIGNIFICANT CHANGE UP
PLATELET # BLD AUTO: 182 K/UL — SIGNIFICANT CHANGE UP (ref 150–400)
PO2 BLDV: 40 MMHG — SIGNIFICANT CHANGE UP (ref 25–45)
POLYCHROMASIA BLD QL SMEAR: SLIGHT — SIGNIFICANT CHANGE UP
POTASSIUM BLDV-SCNC: 5.1 MMOL/L — SIGNIFICANT CHANGE UP (ref 3.5–5.1)
POTASSIUM SERPL-MCNC: 4.6 MMOL/L — SIGNIFICANT CHANGE UP (ref 3.5–5.3)
POTASSIUM SERPL-SCNC: 4.6 MMOL/L — SIGNIFICANT CHANGE UP (ref 3.5–5.3)
PROT SERPL-MCNC: 6.7 G/DL — SIGNIFICANT CHANGE UP (ref 6–8.3)
PROT UR-MCNC: 100 MG/DL
PROTHROM AB SERPL-ACNC: 11 SEC — SIGNIFICANT CHANGE UP (ref 9.5–13)
RBC # BLD: 4.61 M/UL — SIGNIFICANT CHANGE UP (ref 3.8–5.2)
RBC # FLD: 13.2 % — SIGNIFICANT CHANGE UP (ref 10.3–14.5)
RBC BLD AUTO: ABNORMAL
RBC CASTS # UR COMP ASSIST: 13 /HPF — HIGH (ref 0–4)
SAO2 % BLDV: 75 % — SIGNIFICANT CHANGE UP (ref 67–88)
SODIUM SERPL-SCNC: 131 MMOL/L — LOW (ref 135–145)
SP GR SPEC: >1.03 — HIGH (ref 1–1.03)
SQUAMOUS # UR AUTO: 0 /HPF — SIGNIFICANT CHANGE UP (ref 0–5)
TROPONIN T, HIGH SENSITIVITY RESULT: 55 NG/L — CRITICAL HIGH (ref 0–51)
TROPONIN T, HIGH SENSITIVITY RESULT: 63 NG/L — CRITICAL HIGH (ref 0–51)
UROBILINOGEN FLD QL: 0.2 MG/DL — SIGNIFICANT CHANGE UP (ref 0.2–1)
WBC # BLD: 6.22 K/UL — SIGNIFICANT CHANGE UP (ref 3.8–10.5)
WBC # FLD AUTO: 6.22 K/UL — SIGNIFICANT CHANGE UP (ref 3.8–10.5)
WBC UR QL: 8 /HPF — HIGH (ref 0–5)

## 2024-07-29 PROCEDURE — 99291 CRITICAL CARE FIRST HOUR: CPT

## 2024-07-29 PROCEDURE — 93010 ELECTROCARDIOGRAM REPORT: CPT

## 2024-07-29 PROCEDURE — 70496 CT ANGIOGRAPHY HEAD: CPT | Mod: 26,MC

## 2024-07-29 PROCEDURE — 70498 CT ANGIOGRAPHY NECK: CPT | Mod: 26,MC

## 2024-07-29 PROCEDURE — 99223 1ST HOSP IP/OBS HIGH 75: CPT

## 2024-07-29 PROCEDURE — 0042T: CPT | Mod: MC

## 2024-07-29 RX ORDER — POTASSIUM CHLORIDE 1500 MG/1
40 TABLET, EXTENDED RELEASE ORAL ONCE
Refills: 0 | Status: COMPLETED | OUTPATIENT
Start: 2024-07-29 | End: 2024-07-29

## 2024-07-29 RX ORDER — LABETALOL HCL 200 MG
20 TABLET ORAL ONCE
Refills: 0 | Status: COMPLETED | OUTPATIENT
Start: 2024-07-29 | End: 2024-07-29

## 2024-07-29 RX ORDER — HYDRALAZINE HYDROCHLORIDE 100 MG/1
25 TABLET ORAL ONCE
Refills: 0 | Status: DISCONTINUED | OUTPATIENT
Start: 2024-07-29 | End: 2024-07-29

## 2024-07-29 RX ORDER — NIFEDIPINE 20 MG/1
30 CAPSULE, LIQUID FILLED ORAL ONCE
Refills: 0 | Status: COMPLETED | OUTPATIENT
Start: 2024-07-29 | End: 2024-07-29

## 2024-07-29 RX ORDER — HYDRALAZINE HYDROCHLORIDE 100 MG/1
10 TABLET ORAL ONCE
Refills: 0 | Status: DISCONTINUED | OUTPATIENT
Start: 2024-07-29 | End: 2024-07-29

## 2024-07-29 RX ORDER — DEXTROSE 4 G
15 TABLET,CHEWABLE ORAL ONCE
Refills: 0 | Status: DISCONTINUED | OUTPATIENT
Start: 2024-07-29 | End: 2024-08-01

## 2024-07-29 RX ORDER — INSULIN LISPRO 100/ML
9 VIAL (ML) SUBCUTANEOUS
Refills: 0 | Status: DISCONTINUED | OUTPATIENT
Start: 2024-07-29 | End: 2024-07-30

## 2024-07-29 RX ORDER — GLUCAGON INJECTION, SOLUTION 0.5 MG/.1ML
1 INJECTION, SOLUTION SUBCUTANEOUS ONCE
Refills: 0 | Status: DISCONTINUED | OUTPATIENT
Start: 2024-07-29 | End: 2024-08-01

## 2024-07-29 RX ORDER — BACTERIOSTATIC SODIUM CHLORIDE 0.9 %
1000 VIAL (ML) INJECTION ONCE
Refills: 0 | Status: COMPLETED | OUTPATIENT
Start: 2024-07-29 | End: 2024-07-29

## 2024-07-29 RX ORDER — DEXTROSE MONOHYDRATE, SODIUM CHLORIDE, SODIUM LACTATE, CALCIUM CHLORIDE, MAGNESIUM CHLORIDE 1.5; 538; 448; 18.4; 5.08 G/100ML; MG/100ML; MG/100ML; MG/100ML; MG/100ML
1000 SOLUTION INTRAPERITONEAL
Refills: 0 | Status: DISCONTINUED | OUTPATIENT
Start: 2024-07-29 | End: 2024-08-01

## 2024-07-29 RX ORDER — DEXTROSE 4 G
12.5 TABLET,CHEWABLE ORAL ONCE
Refills: 0 | Status: DISCONTINUED | OUTPATIENT
Start: 2024-07-29 | End: 2024-08-01

## 2024-07-29 RX ORDER — INSULIN LISPRO 100/ML
VIAL (ML) SUBCUTANEOUS
Refills: 0 | Status: DISCONTINUED | OUTPATIENT
Start: 2024-07-29 | End: 2024-07-30

## 2024-07-29 RX ORDER — DEXTROSE 4 G
25 TABLET,CHEWABLE ORAL ONCE
Refills: 0 | Status: DISCONTINUED | OUTPATIENT
Start: 2024-07-29 | End: 2024-08-01

## 2024-07-29 RX ADMIN — Medication 1000 MILLILITER(S): at 15:55

## 2024-07-29 RX ADMIN — Medication 100 MILLIGRAM(S): at 21:41

## 2024-07-29 RX ADMIN — POTASSIUM CHLORIDE 40 MILLIEQUIVALENT(S): 1500 TABLET, EXTENDED RELEASE ORAL at 19:38

## 2024-07-29 RX ADMIN — Medication 20 MILLIGRAM(S): at 20:32

## 2024-07-29 RX ADMIN — Medication 10 UNIT(S): at 15:54

## 2024-07-29 RX ADMIN — Medication 20 MILLIGRAM(S): at 19:41

## 2024-07-29 RX ADMIN — Medication 5 UNIT(S): at 15:55

## 2024-07-29 RX ADMIN — NIFEDIPINE 30 MILLIGRAM(S): 20 CAPSULE, LIQUID FILLED ORAL at 21:02

## 2024-07-29 NOTE — H&P ADULT - NSHPSOCIALHISTORY_GEN_ALL_CORE
She reports living alone and falling multiple times at home. She walks with a rolling walker. She reports living alone and falling multiple times at home. She walks with a rolling walker. Pt denies smoking, alcohol, drug use

## 2024-07-29 NOTE — ED ADULT TRIAGE NOTE - CHIEF COMPLAINT QUOTE
Pt. a&ox1, was in [facility] for an appt, when pt. had a mechanical fall. On assessing the pt, she is altered, oriented to person only, and unsure how she got here or why she is here today. Able to tell she has cancer?, and was possibly here for a radiology appt, is from home, lives alone, states she falls often @ home. BGL on arrival >550 reading "HIGH". ELISA ELIZABETH Director. Denies pain or injury att. ekg done.

## 2024-07-29 NOTE — ED PROVIDER NOTE - OBJECTIVE STATEMENT
75 yo F h/o DM (on insulin), NSCLC adenocarcinoma withe mets to brain, s/p XRT, on chemo, R hip replacement, RA, CKD IIIA, HTN and HLD brought after she had a witnessed fall.  Pt was in [facility] for an appt, fell but unable to give me information about how or why, seen by bystanders and brought to ed for eval.  On arrival, pt v confused - unable to give history, name, date w unknown baseline; this improved prior to my eval and pt able to give name, reports h/o brain tumor and freq falls but still unable to recall why she fell.  Pt denies hitting head, loc, neck/back pain, ext pain/injury, cp, palpitations, sob, dizziness, change in vision/speech, numbness/weakness in ext, abd pain, n/v/d, dysuria, fever, uri sx, cough.  Pt reports she's feeling well.  Pt not sure if she took her insulin.

## 2024-07-29 NOTE — H&P ADULT - PROBLEM SELECTOR PLAN 4
Per outpatient Endo note 7/3/24 - A1C goal <8.5%. A1C - 9% (7/3/24). Current regimen: Lantus 18 units daily, Novolog 8 units with meals. outpatient Endocrinologist Dr. Vilma gomez/w home regimen - 18U lantus and 8U lispro  - endocrinology consult in the AM

## 2024-07-29 NOTE — H&P ADULT - ATTENDING COMMENTS
73 yo F with PMHx HTN, HLD, DM, rheumatoid arthritis, CKD, NSCLC (c/b brain metastases) BIBEMS following acute episode of altered mental status at outpatient appointment, s/p negative stroke code imaging, found to be in hypertensive emergency, now improving s/p ED IV anti-HTN administration, admitted for further evaluation and management.      VS in ED concerning for HTN emergency with SBP max 230-230s with acute altered mental status. Stroke-code called in ED however, stroke imaging negative for acute ischemic event. BP improving s/p IV Labetalol 20mg x2 and PO Nifedipine 30mg x1 in ED. Additionally labs notable for hyperglycemia >600, improving s/p Insulin regular 15U in ED. Neuro-stroke signed off with no recommendation for further stroke evaluation at this time given negative imaging and sx most likely i/s/o HTN emergency and hyperglycemia, with improving mental status s/p medical intervention. Neurosurgery consulted given hx of brain mets, no acute surgical interventions recommended at this time.      [ ] Repeat STAT CTH for acute change in mental status   [ ] Resume home anti-HTN   - Amlodipine 10mg QD   - Metoprolol succinate 100mg QD   - Nifedipine 60mg QD   [ ] Resume home DM regimen   - Lantus 18U Qhs   - Lispro 9U TID   [ ] Neurosurgery consulted in ED (No acute interventions)   [ ] Neuro-Stroke consulted in ED (No further stroke evaluation warranted)   [ ] Rad-Onc consult in AM 73 yo F with PMHx HTN, HLD, DM, rheumatoid arthritis, CKD, NSCLC (c/b brain metastases) BIBEMS following acute episode of altered mental status at outpatient appointment, s/p negative stroke code imaging, found to be in hypertensive emergency, now improving s/p ED IV anti-HTN administration, admitted for further evaluation and management.      VS in ED concerning for HTN emergency with SBP max 230-230s with acute altered mental status. EKG reviewed. Troponin downtrending. Stroke-code called in ED however, stroke imaging negative for acute ischemic event. BP improving s/p IV Labetalol 20mg x2 and PO Nifedipine 30mg x1 in ED. Additionally labs notable for hyperglycemia >600, improving s/p Insulin regular 15U in ED. Neuro-stroke signed off with no recommendation for further stroke evaluation at this time given negative imaging and sx most likely i/s/o HTN emergency and hyperglycemia, with improving mental status s/p medical intervention. Neurosurgery consulted given hx of brain mets, no acute surgical interventions recommended at this time.      [ ] Repeat STAT CTH for acute change in mental status   [ ] TTE + Repeat EKG  [ ] Resume home anti-HTN   - Amlodipine 10mg QD   - Metoprolol succinate 100mg QD   - Nifedipine 60mg QD   [ ] Resume home DM regimen   - Lantus 18U Qhs   - Lispro 9U TID   [ ] Neurosurgery consulted in ED (No acute interventions)   [ ] Neuro-Stroke consulted in ED (No further stroke evaluation warranted)   [ ] Rad-Onc consult in AM

## 2024-07-29 NOTE — H&P ADULT - PROBLEM SELECTOR PLAN 7
Pt with L sided pitting edema. No edema on R, No pain, 2+ pulses. Per patient, had edema in the past, not sure if unilateral  - f/u b/l LE doppler

## 2024-07-29 NOTE — H&P ADULT - HISTORY OF PRESENT ILLNESS
Pt  is a 75 yo F w/ PMH of T2DM, NSCLC adenocarcinoma with mets to brain (Dx 2023), s/p XRT, on chemo, R hip replacement, RA, CKD IIIA, HTN and HLD p/w    Vitals: 98.5, -->81 /85-->223/95-->149/74 RR18 SpO2 96%  labs: CBC wnl, FSG >600, troponin 63, Na131, BUN 30 Cr 1.38 glucose 716 BHB 0.5  UA: trace ketones, 8 WBC, numerous bacteria,   CTH: neg for acute process, brain mets  CTA H/N: mild stenosis fo the distal L M1 segment  Intervention: CTX 1g, labetalol 20mg x2, nifedipine 30mg x1, Kcl 40mg x1  Consults: NSGY, neurology - stroke code Pt  is a 73 yo F w/ PMH of T2DM, NSCLC adenocarcinoma with mets to brain (Dx 2023), s/p XRT, on chemo, R hip replacement, RA, CKD IIIA, HTN and HLD presenting for witnessed mechanical fall while waiting for elevator at [address] [place] [address]. Pt does not recall the events leading up to her hospitalization. Per ED note, pt was altered initially and unable to give history, name, date. Pt does report having multiple falls in the past and but still cannot recall what happened. Pt denies all ROS.    In the ED, pt was a stroke code but pt upon neurological examination, pt was AOx3 with no focal neurological deficits does have narrow, shuffling gate.    Vitals: 98.5, -->81 /85-->223/95-->149/74 RR18 SpO2 96%  labs: CBC wnl, FSG >600, troponin 63, Na131, BUN 30 Cr 1.38 glucose 716 BHB 0.5  UA: trace ketones, 8 WBC, numerous bacteria,   CTH: neg for acute process, brain mets  CTA H/N: mild stenosis fo the distal L M1 segment  Intervention: CTX 1g, labetalol 20mg x2, nifedipine 30mg x1, Kcl 40mg x1  Consults: NSGY, neurology - stroke code Pt  is a 73 yo F w/ PMH of T2DM, NSCLC adenocarcinoma with mets to brain (Dx 2023), s/p XRT, on chemo, R hip replacement, RA, CKD IIIA, HTN and HLD presenting for witnessed mechanical fall while waiting for elevator at [address] [place] [address]. Pt does not recall the events leading up to her hospitalization. Per ED note, pt was altered initially and unable to give history, name, date. Pt does report having multiple falls in the past and but still cannot recall what happened. Pt denies all ROS.    In the ED, pt was a stroke code but pt upon neurological examination, pt was AOx3 with no focal neurological deficits does have narrow, shuffling gate.    Vitals: 98.5, -->81 /85-->223/95-->149/74 RR18 SpO2 96%  labs: CBC wnl, FSG >600, troponin 63, Na131, BUN 30 Cr 1.38 glucose 716 BHB 0.5  UA: trace ketones, 8 WBC, numerous bacteria,   CTH: neg for acute process, brain mets  CTA H/N: mild stenosis fo the distal L M1 segment  EKG: NSR with PVCs, QTc 441  Intervention: CTX 1g, labetalol 20mg x2, nifedipine 30mg x1, Kcl 40mg x1  Consults: NSGY, neurology - stroke code

## 2024-07-29 NOTE — ED PROVIDER NOTE - PROGRESS NOTE DETAILS
CT's neg for acute process, + brain mets.  Pt eval and cleared by stroke team.  NSG consulted 2/2 falls, ams and will eval.  Pt's labs w nl pH, no ag, + high glu and psedohyponatremia, mildly elevated beta hydroxybutyrate - no clinical concern for dka based on these labs.  Trop also mildly elevated.  Rpt trop pending.  Glu improving after ivf, insulin; most recent fs 319.  Pt pending nsg eval, ua, rpt trop, rpt labs/fs.  Pt signed out to Dr Quiles. seen by neuro / neurosurgery , no acute interventions,   as pt w fall / generalized weakness, / difficulty to control BP and UTI , w persistent mild confusion will admit to medicine team for further treatment / and evaluation

## 2024-07-29 NOTE — CONSULT NOTE ADULT - SUBJECTIVE AND OBJECTIVE BOX
HISTORY OF PRESENT ILLNESS:   73 y/o F, former smoker, with hx of CKD, type 2 diabetes mellitus, Bell's palsy, HTN, RA, metastatic NSCLC, brain mets (dx in September 2023) who presented to [facility] ED after a witnessed mechanical fall while waiting for the elevator at [facility] to attend her radiology appointment for treatment for brain cancer. Patient suddenly fell. At that time (about 12:55PM), she was oriented to self only. She was unaware of falling while waiting for the elevator but remembers all the events leading up to the fall and afterwards when arriving to the ED. Of note, she lives alone and reports having multiple falls at home. She ambulates with a rolling walker. Stroke code called upon arrival to the ED. CTA head & neck revealed mild stenosis of the distal left M1 segment No TNK was given due to unknown LKW and active brain cancer. Thrombectomy not offered due to low suspicion of large vessel occlusion. Upon examination, pt is alert and oriented x3. She denies LOC, dizziness, lightheadedness, and blurry vision.     PAST MEDICAL & SURGICAL HISTORY:  HTN (hypertension)  HLD (hyperlipidemia)  DM (diabetes mellitus), type 2  Rheumatoid arthritis  Stage 4 chronic kidney disease  Degenerative joint disease (DJD) of lumbar spine  Osteopenia  S/P total right hip arthroplasty    FAMILY HISTORY:  No pertinent family history in first degree relatives    SOCIAL HISTORY:  Tobacco Use: former smoker   EtOH use:  former alcohol use    Substance: denies    Allergies    citrus (Urticaria)  Bactrim (Rash)    Intolerances    REVIEW OF SYSTEMS:  General: no recent illnesses, no recent wt gain/loss, no chills  Skin/Breast:  no rash, lumps, new moles, erythema, tenderness  Ophthalmologic:  no change in vision, diplopia, pain, redness, tearing, dry eyes	  ENMT:	no hearing loss, tinnitus, ear pain, vertigo, nasal congestion, epistaxis, sore throat  Respiratory and Thorax: no coughing, wheezing, recent URI, shortness of breath	  Cardiovascular: no chest pain, YOUNG, leg swelling, irregular rhythm   Gastrointestinal:	no abd pain, nausea, vomiting, diarrhea, constipation, bloody stool, heartburn  Genitourinary: no frequency, dysuria, hematuria  Musculoskeletal:	no joint pain, no joint swelling, no tenderness  Neurological:	 see HPI  Psychiatric:	no confusion, no anxiousness, no depression   Hematology/Lymphatics:	no brusing, easy bleeding, LAD  Endocrine:  	no excess urination/thirst, heat/cold intolerance  Allergic/Immunologic:  no urticaria, sneezing, recurrent infections    MEDICATIONS:  Antibiotics:    Neuro:    Anticoagulation:    OTHER:    IVF:      Vital Signs Last 24 Hrs  T(C): 36.9 (29 Jul 2024 13:19), Max: 36.9 (29 Jul 2024 13:19)  T(F): 98.5 (29 Jul 2024 13:19), Max: 98.5 (29 Jul 2024 13:19)  HR: 108 (29 Jul 2024 13:19) (108 - 108)  BP: 131/85 (29 Jul 2024 13:19) (131/85 - 131/85)  BP(mean): --  RR: 18 (29 Jul 2024 13:19) (18 - 18)  SpO2: 98% (29 Jul 2024 13:19) (98% - 98%)    Parameters below as of 29 Jul 2024 13:19  Patient On (Oxygen Delivery Method): room air    PHYSICAL EXAM:  GEN: laying in bed, appears well, NAD  NEURO: AOx3. FC, OE spont, speech intact, minor L facial due to bells palsy. CNII-XII intact. PERRL, EOMI. No pronator drift. MAEx4. 5/5 strength throughout. SILT.  CV: RRR +S1/S2  PULM: CTAB  GI: NT/ND, +BS  EXT: ext warm, dry, nontender    LABS:                        12.9   6.22  )-----------( 182      ( 29 Jul 2024 13:51 )             39.6     07-29    131<L>  |  94<L>  |  30<H>  ----------------------------<  716<HH>  4.6   |  25  |  1.38<H>    Ca    9.4      29 Jul 2024 13:51    TPro  6.7  /  Alb  3.5  /  TBili  0.3  /  DBili  x   /  AST  19  /  ALT  16  /  AlkPhos  213<H>  07-29    PT/INR - ( 29 Jul 2024 13:51 )   PT: 11.0 sec;   INR: 0.96          PTT - ( 29 Jul 2024 13:51 )  PTT:25.4 sec  Urinalysis Basic - ( 29 Jul 2024 13:51 )    Color: x / Appearance: x / SG: x / pH: x  Gluc: 716 mg/dL / Ketone: x  / Bili: x / Urobili: x   Blood: x / Protein: x / Nitrite: x   Leuk Esterase: x / RBC: x / WBC x   Sq Epi: x / Non Sq Epi: x / Bacteria: x      CULTURES:      RADIOLOGY & ADDITIONAL STUDIES:    Assessment:  73 y/o F, former smoker, with hx of CKD, type 2 diabetes mellitus, Bell's palsy, HTN, RA, metastatic NSCLC, brain mets (dx in September 2023) who presented to [facility] ED after a witnessed mechanical fall while waiting for the elevator at [facility] to attend her radiology appointment for treatment for brain cancer. Patient suddenly fell. At that time (about 12:55PM), she was oriented to self only. She was unaware of falling while waiting for the elevator but remembers all the events leading up to the fall and afterwards when arriving to the ED. Of note, she lives alone and reports having multiple falls at home. She ambulates with a rolling walker. Stroke code called upon arrival to the ED. CTA head & neck revealed mild stenosis of the distal left M1 segment No TNK was given due to unknown LKW and active brain cancer. Thrombectomy not offered due to low suspicion of large vessel occlusion. Upon examination, pt is alert and oriented x3. She denies LOC, dizziness, lightheadedness, and blurry vision.      Plan:  - Imaging reviewed and discussed with Dr D'amico.   - No further inpatient neurosurgical workup.    - Outpatient follow up with Dr. D'amico.     Case discussed with Dr. D'Amico.  HISTORY OF PRESENT ILLNESS:   73 y/o F, former smoker, with hx of CKD, type 2 diabetes mellitus, Bell's palsy, HTN, RA, metastatic NSCLC, brain mets (dx in September 2023) who presented to [facility] ED after a witnessed mechanical fall while waiting for the elevator at [facility] to attend her radiology appointment for treatment for brain cancer. Patient suddenly fell. At that time (about 12:55PM), she was oriented to self only. She was unaware of falling while waiting for the elevator but remembers all the events leading up to the fall and afterwards when arriving to the ED. Of note, she lives alone and reports having multiple falls at home. She ambulates with a rolling walker. Stroke code called upon arrival to the ED. CTA head & neck revealed mild stenosis of the distal left M1 segment No TNK was given due to unknown LKW and active brain cancer. Thrombectomy not offered due to low suspicion of large vessel occlusion. Upon examination, pt is alert and oriented x3. She denies LOC, dizziness, lightheadedness, and blurry vision.     PAST MEDICAL & SURGICAL HISTORY:  HTN (hypertension)  HLD (hyperlipidemia)  DM (diabetes mellitus), type 2  Rheumatoid arthritis  Stage 4 chronic kidney disease  Degenerative joint disease (DJD) of lumbar spine  Osteopenia  S/P total right hip arthroplasty    FAMILY HISTORY:  No pertinent family history in first degree relatives    SOCIAL HISTORY:  Tobacco Use: former smoker   EtOH use:  former alcohol use    Substance: denies    Allergies    citrus (Urticaria)  Bactrim (Rash)    Intolerances    REVIEW OF SYSTEMS:  General: no recent illnesses, no recent wt gain/loss, no chills  Skin/Breast:  no rash, lumps, new moles, erythema, tenderness  Ophthalmologic:  no change in vision, diplopia, pain, redness, tearing, dry eyes	  ENMT:	no hearing loss, tinnitus, ear pain, vertigo, nasal congestion, epistaxis, sore throat  Respiratory and Thorax: no coughing, wheezing, recent URI, shortness of breath	  Cardiovascular: no chest pain, YOUNG, leg swelling, irregular rhythm   Gastrointestinal:	no abd pain, nausea, vomiting, diarrhea, constipation, bloody stool, heartburn  Genitourinary: no frequency, dysuria, hematuria  Musculoskeletal:	no joint pain, no joint swelling, no tenderness  Neurological:	 see HPI  Psychiatric:	no confusion, no anxiousness, no depression   Hematology/Lymphatics:	no brusing, easy bleeding, LAD  Endocrine:  	no excess urination/thirst, heat/cold intolerance  Allergic/Immunologic:  no urticaria, sneezing, recurrent infections    MEDICATIONS:  Antibiotics:    Neuro:    Anticoagulation:    OTHER:    IVF:      Vital Signs Last 24 Hrs  T(C): 36.9 (29 Jul 2024 13:19), Max: 36.9 (29 Jul 2024 13:19)  T(F): 98.5 (29 Jul 2024 13:19), Max: 98.5 (29 Jul 2024 13:19)  HR: 108 (29 Jul 2024 13:19) (108 - 108)  BP: 131/85 (29 Jul 2024 13:19) (131/85 - 131/85)  BP(mean): --  RR: 18 (29 Jul 2024 13:19) (18 - 18)  SpO2: 98% (29 Jul 2024 13:19) (98% - 98%)    Parameters below as of 29 Jul 2024 13:19  Patient On (Oxygen Delivery Method): room air    PHYSICAL EXAM:  GEN: laying in bed, appears well, NAD  NEURO: AOx3. FC, OE spont, speech intact, minor L facial due to bells palsy. CNII-XII intact. PERRL, EOMI. No pronator drift. MAEx4. 5/5 strength throughout. SILT.  CV: RRR +S1/S2  PULM: CTAB  GI: NT/ND, +BS  EXT: ext warm, dry, nontender    LABS:                        12.9   6.22  )-----------( 182      ( 29 Jul 2024 13:51 )             39.6     07-29    131<L>  |  94<L>  |  30<H>  ----------------------------<  716<HH>  4.6   |  25  |  1.38<H>    Ca    9.4      29 Jul 2024 13:51    TPro  6.7  /  Alb  3.5  /  TBili  0.3  /  DBili  x   /  AST  19  /  ALT  16  /  AlkPhos  213<H>  07-29    PT/INR - ( 29 Jul 2024 13:51 )   PT: 11.0 sec;   INR: 0.96          PTT - ( 29 Jul 2024 13:51 )  PTT:25.4 sec  Urinalysis Basic - ( 29 Jul 2024 13:51 )    Color: x / Appearance: x / SG: x / pH: x  Gluc: 716 mg/dL / Ketone: x  / Bili: x / Urobili: x   Blood: x / Protein: x / Nitrite: x   Leuk Esterase: x / RBC: x / WBC x   Sq Epi: x / Non Sq Epi: x / Bacteria: x      CULTURES:      RADIOLOGY & ADDITIONAL STUDIES:    Assessment:  73 y/o F, former smoker, with hx of CKD, type 2 diabetes mellitus, Bell's palsy, HTN, RA, metastatic NSCLC, brain mets (dx in September 2023) who presented to [facility] ED after a witnessed mechanical fall while waiting for the elevator at [facility] to attend her radiology appointment for treatment for brain cancer. Patient suddenly fell. At that time (about 12:55PM), she was oriented to self only. She was unaware of falling while waiting for the elevator but remembers all the events leading up to the fall and afterwards when arriving to the ED. Of note, she lives alone and reports having multiple falls at home. She ambulates with a rolling walker. Stroke code called upon arrival to the ED. CTA head & neck revealed mild stenosis of the distal left M1 segment No TNK was given due to unknown LKW and active brain cancer. Thrombectomy not offered due to low suspicion of large vessel occlusion. Upon examination, pt is alert and oriented x3. She denies LOC, dizziness, lightheadedness, and blurry vision.      Plan:  - Imaging reviewed and discussed with Dr D'amico.   - No further inpatient neurosurgical workup.    - Notify Rad-Onc if pt admitted.   - Outpatient follow up with Dr. D'amico.     Case discussed with Dr. D'Amico.

## 2024-07-29 NOTE — ED ADULT NURSE REASSESSMENT NOTE - NS ED NURSE REASSESS COMMENT FT1
Pt is sleeping in stretcher, alert to verbal stimuli A&Ox4, denies pain, lightheadedness, sob. Resting comfortably in stretcher.

## 2024-07-29 NOTE — ED PROVIDER NOTE - PHYSICAL EXAMINATION
VITAL SIGNS: I have reviewed nursing notes and confirm.  CONSTITUTIONAL:  in no acute distress.   SKIN:  warm and dry, no acute rash.   HEAD:  normocephalic, atraumatic.  EYES: PERRL, EOM intact; conjunctiva and sclera clear.  ENT: No nasal discharge; airway clear.   NECK: Supple; non tender.  CARD: S1, S2 normal; no murmurs, gallops, or rubs. Regular rate and rhythm.   RESP:  Clear to auscultation b/l, no wheezes, rales or rhonchi.  ABD: Normal bowel sounds; soft; non-distended; non-tender; no guarding/ rebound.  MSK: Normal ROM. No clubbing, cyanosis or edema. no ttp bilat le  NEURO: slightly slurred speech, L facial droop, motor 5/5, no gross sens deficits, a and o x 2  PSYCH: Cooperative, mood and affect appropriate.

## 2024-07-29 NOTE — H&P ADULT - PROBLEM SELECTOR PLAN 9
Home meds: Atorvastatin Calcium 40 MG Oral at night  - c/w home me    #RA  Home meds based on surescripts: Hydroxychloroquine Sulfate 200 MG BID, sulfasalazine 500mg BID,  - confirm meds in the AM

## 2024-07-29 NOTE — H&P ADULT - PROBLEM SELECTOR PLAN 1
Pt p/w mechanical fall and AOx0 on admission. Stroke code called in the ED. CTH without acute pathology of stroke but notable for brain mets. Neurological exam significant for shuffling and narrow gait. Reportedly had past mechanical falls but no LOC, UA with 8 WBC and many bacteria but asymptomatic per patient. s/p CTX 1g. Etiology of encephalopathy possibly 2/2 brain mets vs infection (low suspicion) vs hypertensive emergency. Pt is AOx3 now.  TTE 2023:  Normal left ventricular size and systolic function. Moderate symmetric left ventricular hypertrophy  - f/u TTE  - f/u daily BMP  - PT/OT eval Pt p/w mechanical fall and AOx0 on admission. Stroke code called in the ED. CTH without acute pathology of stroke but notable for brain mets. Neurological exam significant for shuffling and narrow gait. Reportedly had past mechanical falls but no LOC, UA with 8 WBC and many bacteria but asymptomatic per patient. s/p CTX 1g. Etiology of encephalopathy possibly 2/2 brain mets vs infection (low suspicion) vs hypertensive emergency. Pt is AOx3 now.  TTE 2023:  Normal left ventricular size and systolic function. Moderate symmetric left ventricular hypertrophy  - f/u UCx  - f/u TTE  - f/u daily BMP  - PT/OT eval

## 2024-07-29 NOTE — CONSULT NOTE ADULT - ASSESSMENT
74y Female with PMHx of DM Type 2, HTN, HLD, Brain CA (dx Sept 2023) presents to [facility] ED after witnessed mechanical fall while waiting for elevator at [facility] to attend radiology appointment for routine screening of Brain CA where she suddenly fell and was oriented to  self only that occurred at approximately 12:55pm. Unknown LKW. Pt states that she was unaware of falling while waiting for the elevator but remembers all of the events leading up to the fall and afterwards when arriving to the ED. She reports living alone and falling multiple times at home. She walks with a rolling walker. Upon neurological examination, pt is A&Ox3, no focal cranial nerve deficits, but does have narrow, shuffling gait. CTH showed peripherally enhancing lesions in the bilateral temporal parietal regions but no acute infarction or hemorrhage. CTA H/N showed mild stenosis of the distal left M1 segment. Tenecteplase not given due to unknown LKW and active Brain CA. Thrombectomy not offered due to low suspicion of LVO.     Impression: sx are less likely due to CVA. Recommend consulting General Neurology and/or NSGY.     No further stroke w/u needed.     Case discussed with Stroke Neurology Attending, Dr. Zhang.

## 2024-07-29 NOTE — ED PROVIDER NOTE - NSICDXPASTMEDICALHX_GEN_ALL_CORE_FT
PAST MEDICAL HISTORY:  Degenerative joint disease (DJD) of lumbar spine     DM (diabetes mellitus), type 2     HLD (hyperlipidemia)     HTN (hypertension)     Lung cancer metastatic to brain     Osteopenia     Rheumatoid arthritis     Stage 4 chronic kidney disease

## 2024-07-29 NOTE — ED PROVIDER NOTE - CARE PLAN
1 Principal Discharge DX:	Hyperglycemia  Secondary Diagnosis:	Falls  Secondary Diagnosis:	AMS (altered mental status)

## 2024-07-29 NOTE — H&P ADULT - PROBLEM SELECTOR PLAN 10
Fluids: none  Electrolytes: Mg >2, K >4  Nutrition: consistent carb  Prophylaxis: lovenox  Activity: PT/OT eval  GI: none  C: FC  Dispo: F

## 2024-07-29 NOTE — PATIENT PROFILE ADULT - FUNCTIONAL ASSESSMENT - DAILY ACTIVITY 2.
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 Pt has no sensation or function below the nipples. Pt is able to move b/l upper extremities.   -pt has a automatic wheelchair for which he uses and is able to transfer himself from chair to bed. 2 = A lot of assistance

## 2024-07-29 NOTE — H&P ADULT - ASSESSMENT
Pt  is a 73 yo F w/ PMH of T2DM, NSCLC adenocarcinoma with mets to brain (Dx 2023), s/p XRT, on chemo, R hip replacement, RA, CKD IIIA, HTN and HLD p/w mechanical fall and found to have hypertensive urgency and hyperglycemia

## 2024-07-29 NOTE — ED ADULT TRIAGE NOTE - NS ED TRIAGE CLINICAL UPGRADE
EMS Deteriorating patient status - Patient was clinically upgraded due to deteriorating patient status.

## 2024-07-29 NOTE — H&P ADULT - NSHPLABSRESULTS_GEN_ALL_CORE
LABS:                         12.9   6.22  )-----------( 182      ( 29 Jul 2024 13:51 )             39.6     07-29    136  |  103  |  24<H>  ----------------------------<  332<H>  3.4<L>   |  23  |  1.20    Ca    9.0      29 Jul 2024 17:01    TPro  6.7  /  Alb  3.5  /  TBili  0.3  /  DBili  x   /  AST  19  /  ALT  16  /  AlkPhos  213<H>  07-29    PT/INR - ( 29 Jul 2024 13:51 )   PT: 11.0 sec;   INR: 0.96          PTT - ( 29 Jul 2024 13:51 )  PTT:25.4 sec  Urinalysis Basic - ( 29 Jul 2024 17:01 )    Color: x / Appearance: x / SG: x / pH: x  Gluc: 332 mg/dL / Ketone: x  / Bili: x / Urobili: x   Blood: x / Protein: x / Nitrite: x   Leuk Esterase: x / RBC: x / WBC x   Sq Epi: x / Non Sq Epi: x / Bacteria: x                RADIOLOGY, EKG & ADDITIONAL TESTS: Reviewed.

## 2024-07-29 NOTE — H&P ADULT - PROBLEM SELECTOR PLAN 5
Follows with Dr. Delaney at [facility]. Pt is receiving both chemo and radiation therapy. Last session for chemo 06/2024.  -ensure Heme/onc following  -palliative care consult in the AM  -Zofran PRN for nausea

## 2024-07-29 NOTE — ED ADULT NURSE NOTE - OBJECTIVE STATEMENT
Pt is a 73yo female presenting to ED c/o AMS. Pt witnessed falling while ambulating with walker to radiology appt for brain cancer f/u. Pt A&Ox1 in triage, when arriving in ER pt A&Ox4, does not remember falling, denies head trauma, LOC, pain to any parts of body. Pt is breathing even and unlabored speaking slowly, in full sentences, denies lightheadedness, vision changes, numbness, tingling, c/p, sob, f/c. Pt placed on CCM, large bore IV access obtained, labs collected.

## 2024-07-29 NOTE — ED PROVIDER NOTE - CLINICAL SUMMARY MEDICAL DECISION MAKING FREE TEXT BOX
Pt brought after witnessed fall w/o apparent injury w transient ams now improving but also noted to have mildly slurred speech, facial droop - ? baseline 2/2 mets or new.  Stroke code called.  Pt also noted to have v high glu (asymptomatic) - ? ams 2/2 glu, ? dka.  Plan labs, ct, ivf, likely insulin but await labs to decide gtt vs iv/sq, stroke team eval; reassess. See progress notes for further mdm related documentation.

## 2024-07-29 NOTE — CONSULT NOTE ADULT - SUBJECTIVE AND OBJECTIVE BOX
**STROKE HPI***    HPI: 74y Female with PMHx of DM Type 2, HTN, HLD, Brain CA (dx Sept 2023) presents to [facility] ED after witnessed mechanical fall while waiting for elevator at [facility] to attend radiology appointment for routine screening of Brain CA where she suddenly fell and was oriented to  self only that occurred at approximately 12:55pm. Unknown LKW. Pt states that she was unaware of falling while waiting for the elevator but remembers all of the events leading up to the fall and afterwards when arriving to the ED. She reports living alone and falling multiple times at home. She walks with a rolling walker. Upon neurological examination, pt is A&Ox3, no focal cranial nerve deficits, but does have narrow, shuffling gait. CTH showed peripherally enhancing lesions in the bilateral temporal parietal regions but no acute infarction or hemorrhage. CTA H/N showed mild stenosis of the distal left M1 segment. Tenecteplase not given due to unknown LKW and active Brain CA. Thrombectomy not offered due to low suspicion of LVO.     PAST MEDICAL & SURGICAL HISTORY:  HTN (hypertension)      HLD (hyperlipidemia)      DM (diabetes mellitus), type 2      Rheumatoid arthritis      Stage 4 chronic kidney disease      Degenerative joint disease (DJD) of lumbar spine      Osteopenia      S/P total right hip arthroplasty          FAMILY HISTORY:  No pertinent family history in first degree relatives        SOCIAL HISTORY:   Patient lives alone at home.     ROS:   Constitutional: No fever, weight loss or fatigue  Eyes: No eye pain, visual disturbances, or discharge  ENMT:  No difficulty hearing, tinnitus, vertigo; No sinus or throat pain  Neck: No pain or stiffness  Respiratory: No cough, wheezing, chills or hemoptysis  Cardiovascular: No chest pain, palpitations, shortness of breath, dizziness or leg swelling  Gastrointestinal: No abdominal pain.   Genitourinary: No dysuria, frequency, hematuria or incontinence  Neurological: As per HPI  Skin: No itching, burning, rashes or lesions   Endocrine: No heat or cold intolerance; No hair loss  Musculoskeletal: No joint pain or swelling; No muscle, back or extremity pain  Psychiatric: No depression, anxiety, mood swings or difficulty sleeping  Heme/Lymph: No easy bruising or bleeding gums    T(C): 36.9 (07-29-24 @ 13:19), Max: 36.9 (07-29-24 @ 13:19)  HR: 108 (07-29-24 @ 13:19) (108 - 108)  BP: 131/85 (07-29-24 @ 13:19) (131/85 - 131/85)  RR: 18 (07-29-24 @ 13:19) (18 - 18)  SpO2: 98% (07-29-24 @ 13:19) (98% - 98%)    MEDICATION RECONCILIATION   MEDICATIONS  (STANDING):  insulin regular  human recombinant 10 Unit(s) IV Push Once  insulin regular  human recombinant. 5 Unit(s) SubCutaneous once  sodium chloride 0.9% Bolus 1000 milliLiter(s) IV Bolus once  sodium chloride 0.9% Bolus 1000 milliLiter(s) IV Bolus once    MEDICATIONS  (PRN):    Allergies    citrus (Urticaria)  Bactrim (Rash)    Intolerances      Vital Signs Last 24 Hrs  T(C): 36.9 (29 Jul 2024 13:19), Max: 36.9 (29 Jul 2024 13:19)  T(F): 98.5 (29 Jul 2024 13:19), Max: 98.5 (29 Jul 2024 13:19)  HR: 108 (29 Jul 2024 13:19) (108 - 108)  BP: 131/85 (29 Jul 2024 13:19) (131/85 - 131/85)  BP(mean): --  RR: 18 (29 Jul 2024 13:19) (18 - 18)  SpO2: 98% (29 Jul 2024 13:19) (98% - 98%)    Parameters below as of 29 Jul 2024 13:19  Patient On (Oxygen Delivery Method): room air        Physical exam:  General: No acute distress, awake and alert  Cardiovascular: Regular rate and rhythm  Pulmonary: No use of accessory muscles    Neurologic:  -Mental status: Awake, alert, oriented to person, place, and time. Speech is fluent with intact naming, repetition, and comprehension, no dysarthria. Recent and remote memory intact. Follows commands. Attention/concentration intact. Fund of knowledge appropriate.  -Cranial nerves:   II: Visual fields are full to confrontation.  III, IV, VI: Extraocular movements are intact without nystagmus. Pupils equally round and reactive to light  V:  Facial sensation V1-V3 equal and intact   VII: Face is symmetric with normal eye closure and smile  Motor: Normal bulk and tone. No pronator drift. Strength bilateral upper extremity 5/5, bilateral lower extremities 5/5.  Sensation: Intact to light touch bilaterally. No neglect or extinction on double simultaneous testing.  Coordination: No dysmetria on finger-to-nose bilaterally   Gait: Narrow gait and shuffling     NIHSS Score: 0    Fingerstick Blood Glucose: CAPILLARY BLOOD GLUCOSE      POCT Blood Glucose.: >600 mg/dL (29 Jul 2024 13:23)    LABS:                        12.9   6.22  )-----------( 182      ( 29 Jul 2024 13:51 )             39.6     07-29    131<L>  |  94<L>  |  30<H>  ----------------------------<  716<HH>  4.6   |  25  |  1.38<H>    Ca    9.4      29 Jul 2024 13:51    TPro  6.7  /  Alb  3.5  /  TBili  0.3  /  DBili  x   /  AST  19  /  ALT  16  /  AlkPhos  213<H>  07-29    PT/INR - ( 29 Jul 2024 13:51 )   PT: 11.0 sec;   INR: 0.96          PTT - ( 29 Jul 2024 13:51 )  PTT:25.4 sec      Urinalysis Basic - ( 29 Jul 2024 13:51 )    Color: x / Appearance: x / SG: x / pH: x  Gluc: 716 mg/dL / Ketone: x  / Bili: x / Urobili: x   Blood: x / Protein: x / Nitrite: x   Leuk Esterase: x / RBC: x / WBC x   Sq Epi: x / Non Sq Epi: x / Bacteria: x        RADIOLOGY & ADDITIONAL STUDIES:    HCT: No acute intracranial hemorrhage.  Previously noted peripherally enhancing lesions in the bilateral temporal parietal regions better seen on recent MRI brain. There is no mass effect.    CT PERFUSION:  CBF<30%: 0 mL  TMAX>6s: 3 mL in the right occipital region possibly artifactual  MISMATCH VOLUME: 3 mL  MISMATCH RATIO: None.  MISCELLANEOUS: None.      CTA NECK:  No evidence of significant stenosis or occlusion.    CTA HEAD:  No large vessel occlusion. Mild stenosis of the distal left M1 segment.

## 2024-07-29 NOTE — ED ADULT NURSE NOTE - NSICDXPASTMEDICALHX_GEN_ALL_CORE_FT
PAST MEDICAL HISTORY:  Degenerative joint disease (DJD) of lumbar spine     DM (diabetes mellitus), type 2     HLD (hyperlipidemia)     HTN (hypertension)     Osteopenia     Rheumatoid arthritis     Stage 4 chronic kidney disease      PAST MEDICAL HISTORY:  Degenerative joint disease (DJD) of lumbar spine     DM (diabetes mellitus), type 2     HLD (hyperlipidemia)     HTN (hypertension)     Lung cancer metastatic to brain     Osteopenia     Rheumatoid arthritis     Stage 4 chronic kidney disease

## 2024-07-29 NOTE — H&P ADULT - NSHPPHYSICALEXAM_GEN_ALL_CORE
GENERAL: NAD, lying in bed comfortably  HEAD:  Atraumatic, normocephalic  EYES: EOMI, PERRLA, conjunctiva and sclera clear  NECK: Supple, trachea midline, no JVD  HEART: Regular rate and rhythm, no murmurs, rubs, or gallops  LUNGS: Unlabored respirations.  Clear to auscultation bilaterally, no crackles, wheezing, or rhonchi  ABDOMEN: Soft, nontender, nondistended, +BS  EXTREMITIES: 2+ peripheral pulses bilaterally. No clubbing, cyanosis, or edema  NERVOUS SYSTEM:  A&Ox3, moving all extremities, no focal deficits   SKIN: No rashes or lesions GENERAL: NAD, lying in bed comfortably, slow speech   HEAD:  Atraumatic, normocephalic  EYES: EOMI, PERRLA, conjunctiva and sclera clear  NECK: Supple, trachea midline, no JVD  HEART: Regular rate and rhythm, no murmurs, rubs, or gallops  LUNGS: Unlabored respirations.  Clear to auscultation bilaterally, no crackles, wheezing, or rhonchi  ABDOMEN: Soft, nontender, nondistended, +BS  EXTREMITIES: 2+ peripheral pulses, cool extremities, L sided pitting edema only  NERVOUS SYSTEM:  A&Ox3, moving all extremities, no focal deficits   SKIN: No rashes or lesions

## 2024-07-29 NOTE — H&P ADULT - PROBLEM SELECTOR PLAN 2
On admission BP elevated to 223/95. S/p  labetalol 20mg x2, nifedipine 30mg x1. Home meds based on HIEE and surescripts: Amlodipine 10mg qd, Metoprolol Succinate ER 100mg qd, nifedipine ER 60mg qd  - c/w home meds  - formal med rec in the AM On admission BP elevated to 223/95. EKG NSR with PVCs .S/p  labetalol 20mg x2, nifedipine 30mg x1. Home meds based on HIEE and surescripts: Amlodipine 10mg qd, Metoprolol Succinate ER 100mg qd, nifedipine ER 60mg qd  - c/w home meds  - formal med rec in the AM  - f/u TTE

## 2024-07-29 NOTE — PATIENT PROFILE ADULT - FALL HARM RISK - HARM RISK INTERVENTIONS

## 2024-07-29 NOTE — H&P ADULT - PROBLEM SELECTOR PLAN 6
Baseline Cr ~1.2. On admission Cr 1.38-->1.2 s/p 2L NS. likely iso hypertensive emergency.  - f/u daily BMP  RESOVLED Baseline Cr ~1.2. On admission Cr 1.38-->1.2 s/p 2L NS. likely iso hypertensive emergency.  - f/u daily BMP  RESOLVED

## 2024-07-29 NOTE — H&P ADULT - PROBLEM SELECTOR PLAN 3
Per patient, is compliant with insulin regimen. But admits that she is not on consistent carb diet. Noted to have ED visits in the past with hyperglycemia. On admission, blood glucose >700. BHB 0.5. Low concern for DKA as ABG w/o acidosis or anion gap. s/p 15U insulin in the ED and FSG improved to 289. Pt is asymptomatic.  - re-started home insulin regimen  RESOLVED

## 2024-07-30 ENCOUNTER — TRANSCRIPTION ENCOUNTER (OUTPATIENT)
Age: 74
End: 2024-07-30

## 2024-07-30 ENCOUNTER — NON-APPOINTMENT (OUTPATIENT)
Age: 74
End: 2024-07-30
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
## 2024-07-30 DIAGNOSIS — I16.0 HYPERTENSIVE URGENCY: ICD-10-CM

## 2024-07-30 DIAGNOSIS — R60.0 LOCALIZED EDEMA: ICD-10-CM

## 2024-07-30 DIAGNOSIS — R53.81 OTHER MALAISE: ICD-10-CM

## 2024-07-30 DIAGNOSIS — Z51.5 ENCOUNTER FOR PALLIATIVE CARE: ICD-10-CM

## 2024-07-30 DIAGNOSIS — N17.9 ACUTE KIDNEY FAILURE, UNSPECIFIED: ICD-10-CM

## 2024-07-30 DIAGNOSIS — E11.9 TYPE 2 DIABETES MELLITUS WITHOUT COMPLICATIONS: ICD-10-CM

## 2024-07-30 DIAGNOSIS — R79.89 OTHER SPECIFIED ABNORMAL FINDINGS OF BLOOD CHEMISTRY: ICD-10-CM

## 2024-07-30 DIAGNOSIS — G93.49 OTHER ENCEPHALOPATHY: ICD-10-CM

## 2024-07-30 DIAGNOSIS — R73.9 HYPERGLYCEMIA, UNSPECIFIED: ICD-10-CM

## 2024-07-30 DIAGNOSIS — Z71.89 OTHER SPECIFIED COUNSELING: ICD-10-CM

## 2024-07-30 DIAGNOSIS — Z29.9 ENCOUNTER FOR PROPHYLACTIC MEASURES, UNSPECIFIED: ICD-10-CM

## 2024-07-30 DIAGNOSIS — M06.9 RHEUMATOID ARTHRITIS, UNSPECIFIED: ICD-10-CM

## 2024-07-30 DIAGNOSIS — I16.1 HYPERTENSIVE EMERGENCY: ICD-10-CM

## 2024-07-30 DIAGNOSIS — C34.90 MALIGNANT NEOPLASM OF UNSPECIFIED PART OF UNSPECIFIED BRONCHUS OR LUNG: ICD-10-CM

## 2024-07-30 DIAGNOSIS — E78.5 HYPERLIPIDEMIA, UNSPECIFIED: ICD-10-CM

## 2024-07-30 LAB
A1C WITH ESTIMATED AVERAGE GLUCOSE RESULT: 12 % — HIGH (ref 4–5.6)
ALBUMIN SERPL ELPH-MCNC: 3.2 G/DL — LOW (ref 3.3–5)
ALP SERPL-CCNC: 106 U/L — SIGNIFICANT CHANGE UP (ref 40–120)
ALT FLD-CCNC: 13 U/L — SIGNIFICANT CHANGE UP (ref 10–45)
ANION GAP SERPL CALC-SCNC: 10 MMOL/L — SIGNIFICANT CHANGE UP (ref 5–17)
AST SERPL-CCNC: 21 U/L — SIGNIFICANT CHANGE UP (ref 10–40)
BASOPHILS # BLD AUTO: 0.13 K/UL — SIGNIFICANT CHANGE UP (ref 0–0.2)
BASOPHILS NFR BLD AUTO: 1.8 % — SIGNIFICANT CHANGE UP (ref 0–2)
BILIRUB SERPL-MCNC: 0.3 MG/DL — SIGNIFICANT CHANGE UP (ref 0.2–1.2)
BUN SERPL-MCNC: 16 MG/DL — SIGNIFICANT CHANGE UP (ref 7–23)
CALCIUM SERPL-MCNC: 9 MG/DL — SIGNIFICANT CHANGE UP (ref 8.4–10.5)
CHLORIDE SERPL-SCNC: 104 MMOL/L — SIGNIFICANT CHANGE UP (ref 96–108)
CO2 SERPL-SCNC: 26 MMOL/L — SIGNIFICANT CHANGE UP (ref 22–31)
CREAT SERPL-MCNC: 1.1 MG/DL — SIGNIFICANT CHANGE UP (ref 0.5–1.3)
EGFR: 52 ML/MIN/1.73M2 — LOW
EOSINOPHIL # BLD AUTO: 0.12 K/UL — SIGNIFICANT CHANGE UP (ref 0–0.5)
EOSINOPHIL NFR BLD AUTO: 1.7 % — SIGNIFICANT CHANGE UP (ref 0–6)
ESTIMATED AVERAGE GLUCOSE: 298 MG/DL — HIGH (ref 68–114)
GLUCOSE BLDC GLUCOMTR-MCNC: 253 MG/DL — HIGH (ref 70–99)
GLUCOSE BLDC GLUCOMTR-MCNC: 314 MG/DL — HIGH (ref 70–99)
GLUCOSE BLDC GLUCOMTR-MCNC: 448 MG/DL — HIGH (ref 70–99)
GLUCOSE BLDC GLUCOMTR-MCNC: 449 MG/DL — HIGH (ref 70–99)
GLUCOSE BLDC GLUCOMTR-MCNC: 453 MG/DL — CRITICAL HIGH (ref 70–99)
GLUCOSE BLDC GLUCOMTR-MCNC: 461 MG/DL — CRITICAL HIGH (ref 70–99)
GLUCOSE SERPL-MCNC: 220 MG/DL — HIGH (ref 70–99)
HCT VFR BLD CALC: 38.7 % — SIGNIFICANT CHANGE UP (ref 34.5–45)
HGB BLD-MCNC: 12.4 G/DL — SIGNIFICANT CHANGE UP (ref 11.5–15.5)
IMM GRANULOCYTES NFR BLD AUTO: 5.4 % — HIGH (ref 0–0.9)
LYMPHOCYTES # BLD AUTO: 1.06 K/UL — SIGNIFICANT CHANGE UP (ref 1–3.3)
LYMPHOCYTES # BLD AUTO: 14.9 % — SIGNIFICANT CHANGE UP (ref 13–44)
MAGNESIUM SERPL-MCNC: 1.3 MG/DL — LOW (ref 1.6–2.6)
MCHC RBC-ENTMCNC: 27.9 PG — SIGNIFICANT CHANGE UP (ref 27–34)
MCHC RBC-ENTMCNC: 32 GM/DL — SIGNIFICANT CHANGE UP (ref 32–36)
MCV RBC AUTO: 87 FL — SIGNIFICANT CHANGE UP (ref 80–100)
MONOCYTES # BLD AUTO: 0.71 K/UL — SIGNIFICANT CHANGE UP (ref 0–0.9)
MONOCYTES NFR BLD AUTO: 10 % — SIGNIFICANT CHANGE UP (ref 2–14)
NEUTROPHILS # BLD AUTO: 4.7 K/UL — SIGNIFICANT CHANGE UP (ref 1.8–7.4)
NEUTROPHILS NFR BLD AUTO: 66.2 % — SIGNIFICANT CHANGE UP (ref 43–77)
NRBC # BLD: 0 /100 WBCS — SIGNIFICANT CHANGE UP (ref 0–0)
PHOSPHATE SERPL-MCNC: 2 MG/DL — LOW (ref 2.5–4.5)
PLATELET # BLD AUTO: 179 K/UL — SIGNIFICANT CHANGE UP (ref 150–400)
POTASSIUM SERPL-MCNC: 4.1 MMOL/L — SIGNIFICANT CHANGE UP (ref 3.5–5.3)
POTASSIUM SERPL-SCNC: 4.1 MMOL/L — SIGNIFICANT CHANGE UP (ref 3.5–5.3)
PROT SERPL-MCNC: 6.2 G/DL — SIGNIFICANT CHANGE UP (ref 6–8.3)
RBC # BLD: 4.45 M/UL — SIGNIFICANT CHANGE UP (ref 3.8–5.2)
RBC # FLD: 13.5 % — SIGNIFICANT CHANGE UP (ref 10.3–14.5)
SODIUM SERPL-SCNC: 140 MMOL/L — SIGNIFICANT CHANGE UP (ref 135–145)
WBC # BLD: 7.1 K/UL — SIGNIFICANT CHANGE UP (ref 3.8–10.5)
WBC # FLD AUTO: 7.1 K/UL — SIGNIFICANT CHANGE UP (ref 3.8–10.5)

## 2024-07-30 PROCEDURE — 99223 1ST HOSP IP/OBS HIGH 75: CPT

## 2024-07-30 PROCEDURE — 99232 SBSQ HOSP IP/OBS MODERATE 35: CPT

## 2024-07-30 PROCEDURE — 93970 EXTREMITY STUDY: CPT | Mod: 26

## 2024-07-30 PROCEDURE — 99233 SBSQ HOSP IP/OBS HIGH 50: CPT | Mod: GC

## 2024-07-30 PROCEDURE — 95718 EEG PHYS/QHP 2-12 HR W/VEEG: CPT

## 2024-07-30 RX ORDER — INSULIN LISPRO 100/ML
11 VIAL (ML) SUBCUTANEOUS
Refills: 0 | Status: DISCONTINUED | OUTPATIENT
Start: 2024-07-30 | End: 2024-07-31

## 2024-07-30 RX ORDER — ATORVASTATIN CALCIUM 40 MG/1
40 TABLET, FILM COATED ORAL AT BEDTIME
Refills: 0 | Status: DISCONTINUED | OUTPATIENT
Start: 2024-07-30 | End: 2024-08-01

## 2024-07-30 RX ORDER — METOPROLOL TARTRATE 100 MG
50 TABLET ORAL EVERY 12 HOURS
Refills: 0 | Status: DISCONTINUED | OUTPATIENT
Start: 2024-07-30 | End: 2024-07-30

## 2024-07-30 RX ORDER — INSULIN GLARGINE-YFGN 100 [IU]/ML
17 INJECTION, SOLUTION SUBCUTANEOUS AT BEDTIME
Refills: 0 | Status: DISCONTINUED | OUTPATIENT
Start: 2024-07-30 | End: 2024-07-31

## 2024-07-30 RX ORDER — METOPROLOL TARTRATE 100 MG
100 TABLET ORAL EVERY 24 HOURS
Refills: 0 | Status: DISCONTINUED | OUTPATIENT
Start: 2024-07-31 | End: 2024-08-01

## 2024-07-30 RX ORDER — NYSTATIN 100000 [USP'U]/G
1 POWDER TOPICAL EVERY 12 HOURS
Refills: 0 | Status: DISCONTINUED | OUTPATIENT
Start: 2024-07-30 | End: 2024-08-01

## 2024-07-30 RX ORDER — ENOXAPARIN SODIUM 120 MG/.8ML
40 INJECTION SUBCUTANEOUS EVERY 12 HOURS
Refills: 0 | Status: DISCONTINUED | OUTPATIENT
Start: 2024-07-30 | End: 2024-07-31

## 2024-07-30 RX ORDER — METOPROLOL TARTRATE 100 MG
100 TABLET ORAL DAILY
Refills: 0 | Status: DISCONTINUED | OUTPATIENT
Start: 2024-07-30 | End: 2024-07-30

## 2024-07-30 RX ORDER — INSULIN LISPRO 100/ML
VIAL (ML) SUBCUTANEOUS
Refills: 0 | Status: DISCONTINUED | OUTPATIENT
Start: 2024-07-30 | End: 2024-08-01

## 2024-07-30 RX ORDER — MAGNESIUM SULFATE 500 MG/ML
2 VIAL (ML) INJECTION ONCE
Refills: 0 | Status: COMPLETED | OUTPATIENT
Start: 2024-07-30 | End: 2024-07-30

## 2024-07-30 RX ORDER — AMLODIPINE BESYLATE 2.5 MG/1
10 TABLET ORAL EVERY 24 HOURS
Refills: 0 | Status: DISCONTINUED | OUTPATIENT
Start: 2024-07-30 | End: 2024-07-30

## 2024-07-30 RX ORDER — ENOXAPARIN SODIUM 120 MG/.8ML
40 INJECTION SUBCUTANEOUS EVERY 24 HOURS
Refills: 0 | Status: DISCONTINUED | OUTPATIENT
Start: 2024-07-30 | End: 2024-07-30

## 2024-07-30 RX ORDER — NIFEDIPINE 20 MG/1
60 CAPSULE, LIQUID FILLED ORAL EVERY 24 HOURS
Refills: 0 | Status: DISCONTINUED | OUTPATIENT
Start: 2024-07-30 | End: 2024-08-01

## 2024-07-30 RX ORDER — NIFEDIPINE 20 MG/1
60 CAPSULE, LIQUID FILLED ORAL EVERY 24 HOURS
Refills: 0 | Status: DISCONTINUED | OUTPATIENT
Start: 2024-07-30 | End: 2024-07-30

## 2024-07-30 RX ORDER — SODIUM PHOSPHATE, MONOBASIC, MONOHYDRATE 276; 142 MG/ML; MG/ML
30 INJECTION, SOLUTION INTRAVENOUS ONCE
Refills: 0 | Status: COMPLETED | OUTPATIENT
Start: 2024-07-30 | End: 2024-07-30

## 2024-07-30 RX ADMIN — Medication 9 UNIT(S): at 18:19

## 2024-07-30 RX ADMIN — Medication 9 UNIT(S): at 14:26

## 2024-07-30 RX ADMIN — SODIUM PHOSPHATE, MONOBASIC, MONOHYDRATE 85 MILLIMOLE(S): 276; 142 INJECTION, SOLUTION INTRAVENOUS at 09:59

## 2024-07-30 RX ADMIN — ATORVASTATIN CALCIUM 40 MILLIGRAM(S): 40 TABLET, FILM COATED ORAL at 22:42

## 2024-07-30 RX ADMIN — Medication 9 UNIT(S): at 09:49

## 2024-07-30 RX ADMIN — ENOXAPARIN SODIUM 40 MILLIGRAM(S): 120 INJECTION SUBCUTANEOUS at 06:12

## 2024-07-30 RX ADMIN — NIFEDIPINE 60 MILLIGRAM(S): 20 CAPSULE, LIQUID FILLED ORAL at 06:13

## 2024-07-30 RX ADMIN — NYSTATIN 1 APPLICATION(S): 100000 POWDER TOPICAL at 17:54

## 2024-07-30 RX ADMIN — NYSTATIN 1 APPLICATION(S): 100000 POWDER TOPICAL at 06:13

## 2024-07-30 RX ADMIN — Medication 8: at 22:42

## 2024-07-30 RX ADMIN — INSULIN GLARGINE-YFGN 17 UNIT(S): 100 INJECTION, SOLUTION SUBCUTANEOUS at 22:41

## 2024-07-30 RX ADMIN — Medication 12: at 14:26

## 2024-07-30 RX ADMIN — Medication 12: at 18:20

## 2024-07-30 RX ADMIN — Medication 25 GRAM(S): at 09:19

## 2024-07-30 RX ADMIN — ENOXAPARIN SODIUM 40 MILLIGRAM(S): 120 INJECTION SUBCUTANEOUS at 22:41

## 2024-07-30 NOTE — PROGRESS NOTE ADULT - PROBLEM SELECTOR PLAN 2
On admission BP elevated to 223/95. EKG NSR with PVCs .S/p  labetalol 20mg x2, nifedipine 30mg x1. Home meds based on HIEE and surescripts: Amlodipine 10mg qd, Metoprolol Succinate ER 100mg qd, nifedipine ER 60mg qd  - c/w home meds  - formal med rec in the AM  - f/u TTE On admission BP elevated to 223/95. EKG NSR with PVCs .S/p  labetalol 20mg x2, nifedipine 30mg x1. Home meds based on HIEE and surescripts: Amlodipine 10mg qd, Metoprolol Succinate ER 100mg qd, nifedipine ER 60mg qd    Plan:    - Hypertensive urgency has resolved  - c/w home meds  - formal med rec #HTN  Hypertensive urgency resolved (admission /95, EKG NSR with PVCs, S/p labetalol 20mg x2, nifedipine 30mg x1).  Home meds based on HIEE and surescripts: Amlodipine 10mg qd, Metoprolol Succinate ER 100mg qd, nifedipine ER 60mg qd    Plan:  - c/w metoprolol 100mg qd  - c/w nifedipine ER 60mg qd  - hold amlodipine

## 2024-07-30 NOTE — DISEASE MANAGEMENT
[Pathological] : TNM Stage: p [TTNM] : x [NTNM] : x [MTNM] : 1 [IV] : IV [de-identified] : 2700cGy [de-identified] : 2701cGy [de-identified] : Brain

## 2024-07-30 NOTE — DISCHARGE NOTE PROVIDER - CARE PROVIDER_API CALL
Marissa Douglass J  Critical Care Medicine  [address] [address]  Phone: [phone]  Fax: [phone]  Scheduled Appointment: 08/05/2024 04:20 PM

## 2024-07-30 NOTE — PROGRESS NOTE ADULT - SUBJECTIVE AND OBJECTIVE BOX
INTERVAL HPI/OVERNIGHT EVENTS:      VITALS:  T(C): 37 (07-30-24 @ 05:21), Max: 37 (07-29-24 @ 18:15)  HR: 71 (07-30-24 @ 05:21) (59 - 108)  BP: 126/78 (07-30-24 @ 05:21) (126/78 - 230/100)  RR: 18 (07-30-24 @ 05:21) (17 - 18)  SpO2: 96% (07-30-24 @ 05:21) (95% - 99%)    PHYSICAL EXAM:  Constitutional: resting comfortably in bed; NAD  HEENT: NC/AT, EOMI, anicteric sclera, MMM  Neck: supple; no JVD  Respiratory: clear to auscultation bilaterally; no w/r/r  Cardiac: regular rate and rhythm; no m/r/g  GI: abdomen soft, nontender, nondistended; no rebound or guarding  Extremities: warm, no cyanosis, no peripheral edema  Musculoskeletal: NROM x4  Neurologic: AAOx3, no focal deficits, moving all extremities equally  Psychiatric: affect and characteristics of appearance, verbalizations, behaviors      Consultant(s) Notes Reviewed:  [x] YES  [ ] NO  Care Discussed with Consultants/Other Providers [x] YES  [ ] NO    LABS:                        12.9   6.22  )-----------( 182      ( 29 Jul 2024 13:51 )             39.6     07-29    136  |  103  |  24<H>  ----------------------------<  332<H>  3.4<L>   |  23  |  1.20    Ca    9.0      29 Jul 2024 17:01    TPro  6.7  /  Alb  3.5  /  TBili  0.3  /  DBili  x   /  AST  19  /  ALT  16  /  AlkPhos  213<H>  07-29    PT/INR - ( 29 Jul 2024 13:51 )   PT: 11.0 sec;   INR: 0.96          PTT - ( 29 Jul 2024 13:51 )  PTT:25.4 sec  Urinalysis Basic - ( 29 Jul 2024 17:01 )    Color: x / Appearance: x / SG: x / pH: x  Gluc: 332 mg/dL / Ketone: x  / Bili: x / Urobili: x   Blood: x / Protein: x / Nitrite: x   Leuk Esterase: x / RBC: x / WBC x   Sq Epi: x / Non Sq Epi: x / Bacteria: x      CAPILLARY BLOOD GLUCOSE      POCT Blood Glucose.: 289 mg/dL (29 Jul 2024 21:29)  POCT Blood Glucose.: 232 mg/dL (29 Jul 2024 17:59)  POCT Blood Glucose.: 319 mg/dL (29 Jul 2024 16:56)  POCT Blood Glucose.: >600 mg/dL (29 Jul 2024 15:53)  POCT Blood Glucose.: >600 mg/dL (29 Jul 2024 13:23)        Urinalysis Basic - ( 29 Jul 2024 17:01 )    Color: x / Appearance: x / SG: x / pH: x  Gluc: 332 mg/dL / Ketone: x  / Bili: x / Urobili: x   Blood: x / Protein: x / Nitrite: x   Leuk Esterase: x / RBC: x / WBC x   Sq Epi: x / Non Sq Epi: x / Bacteria: x        RADIOLOGY, EKG, & ADDITIONAL TESTS:      Imaging Personally Reviewed:  [x] YES  [ ] NO      HEALTH ISSUES - PROBLEM Dx:  Other encephalopathy    Acute hyperglycemia    Type II diabetes mellitus    NSCLC metastatic to brain    Acute kidney injury superimposed on CKD    Hypertensive emergency    Hyperlipidemia    Prophylactic measure    Elevated troponin    Lower extremity edema         HOSPITAL COURSE: Patient is a 74 year old female with a PMH NSCLC with mets to the brain, rheumatoid arthritis, HTN, HLD and CKD IV, who presented to the ED with a fall. She was at [facility] for a f/u appointment with radiology and fell while waiting for the elevator. In ED, CT scan was negative for brain bleed, stroke workup showed negative CT angio. She was hyperglycemic to 716 and had a hypertensive urgency to 223/95. She received labetalol and nifedipine which corrected her BP down to 149/74. Home diabetes regimen was also given in ED along with KCl 40mg and 1g CTX.     OVERNIGHT: CYNDY    SUBJECTIVE: She says she slept well, she denies pain or discomfort. She was A&Ox1 during evaluation      VITALS:  T(C): 37 (07-30-24 @ 05:21), Max: 37 (07-29-24 @ 18:15)  HR: 71 (07-30-24 @ 05:21) (59 - 108)  BP: 126/78 (07-30-24 @ 05:21) (126/78 - 230/100)  RR: 18 (07-30-24 @ 05:21) (17 - 18)  SpO2: 96% (07-30-24 @ 05:21) (95% - 99%)    PHYSICAL EXAM:  Constitutional: resting comfortably in bed; NAD  HEENT: NC/AT, EOMI, anicteric sclera, MMM  Neck: supple; no JVD  Respiratory: clear to auscultation bilaterally; no w/r/r  Cardiac: regular rate and rhythm; no m/r/g  GI: abdomen soft, nontender, nondistended; no rebound or guarding  Extremities: warm, no cyanosis, RLE edema up to the knee   Musculoskeletal: NROM x4  Neurologic: AAOx1 (name), no focal deficits, moving all extremities equally  Psychiatric: affect and characteristics of appearance, verbalizations, behaviors      Consultant(s) Notes Reviewed:  [x] YES  [ ] NO  Care Discussed with Consultants/Other Providers [x] YES  [ ] NO    LABS:                        12.9   6.22  )-----------( 182      ( 29 Jul 2024 13:51 )             39.6     07-29    136  |  103  |  24<H>  ----------------------------<  332<H>  3.4<L>   |  23  |  1.20    Ca    9.0      29 Jul 2024 17:01    TPro  6.7  /  Alb  3.5  /  TBili  0.3  /  DBili  x   /  AST  19  /  ALT  16  /  AlkPhos  213<H>  07-29    PT/INR - ( 29 Jul 2024 13:51 )   PT: 11.0 sec;   INR: 0.96          PTT - ( 29 Jul 2024 13:51 )  PTT:25.4 sec  Urinalysis Basic - ( 29 Jul 2024 17:01 )    Color: x / Appearance: x / SG: x / pH: x  Gluc: 332 mg/dL / Ketone: x  / Bili: x / Urobili: x   Blood: x / Protein: x / Nitrite: x   Leuk Esterase: x / RBC: x / WBC x   Sq Epi: x / Non Sq Epi: x / Bacteria: x      CAPILLARY BLOOD GLUCOSE      POCT Blood Glucose.: 289 mg/dL (29 Jul 2024 21:29)  POCT Blood Glucose.: 232 mg/dL (29 Jul 2024 17:59)  POCT Blood Glucose.: 319 mg/dL (29 Jul 2024 16:56)  POCT Blood Glucose.: >600 mg/dL (29 Jul 2024 15:53)  POCT Blood Glucose.: >600 mg/dL (29 Jul 2024 13:23)        Urinalysis Basic - ( 29 Jul 2024 17:01 )    Color: x / Appearance: x / SG: x / pH: x  Gluc: 332 mg/dL / Ketone: x  / Bili: x / Urobili: x   Blood: x / Protein: x / Nitrite: x   Leuk Esterase: x / RBC: x / WBC x   Sq Epi: x / Non Sq Epi: x / Bacteria: x        RADIOLOGY, EKG, & ADDITIONAL TESTS:      Imaging Personally Reviewed:  [x] YES  [ ] NO      HEALTH ISSUES - PROBLEM Dx:  Other encephalopathy    Acute hyperglycemia    Type II diabetes mellitus    NSCLC metastatic to brain    Acute kidney injury superimposed on CKD    Hypertensive emergency    Hyperlipidemia    Prophylactic measure    Elevated troponin    Lower extremity edema         HOSPITAL COURSE: Patient is a 74 year old female with a PMH NSCLC with mets to the brain, rheumatoid arthritis, HTN, HLD and CKD IV, who presented to the ED with a fall. She was at [facility] for a f/u appointment with radiology and fell while waiting for the elevator. In ED, CT scan was negative for brain bleed, stroke workup showed negative CT angio. She was hyperglycemic to 716 and had a hypertensive urgency to 223/95. She received labetalol and nifedipine which corrected her BP down to 149/74. Home diabetes regimen was also given in ED along with KCl 40mg and 1g CTX.     OVERNIGHT: CYNDY    SUBJECTIVE: She says she slept well, she denies pain or discomfort. She was A&Ox1 and sleepy during initial evaluation, improved to AOx3 later in the morning when encountered eating breakfast.      VITALS:  T(C): 37 (07-30-24 @ 05:21), Max: 37 (07-29-24 @ 18:15)  HR: 71 (07-30-24 @ 05:21) (59 - 108)  BP: 126/78 (07-30-24 @ 05:21) (126/78 - 230/100)  RR: 18 (07-30-24 @ 05:21) (17 - 18)  SpO2: 96% (07-30-24 @ 05:21) (95% - 99%)    PHYSICAL EXAM:  Constitutional: resting comfortably in bed; NAD  HEENT: NC/AT, EOMI, anicteric sclera, MMM  Neck: supple; no JVD  Respiratory: clear to auscultation bilaterally; no w/r/r  Cardiac: regular rate and rhythm; no m/r/g  GI: abdomen soft, nontender, nondistended; no rebound or guarding  Extremities: warm, no cyanosis, 1+ RLE edema up to the knee   Neurologic: AOx3, slow speech, cogwheeling, resting tremor in bilateral hands and chin, no focal deficits, moving all extremities equally  Psychiatric: affect and characteristics of appearance, verbalizations, behaviors      Consultant(s) Notes Reviewed:  [x] YES  [ ] NO  Care Discussed with Consultants/Other Providers [x] YES  [ ] NO    LABS:                        12.9   6.22  )-----------( 182      ( 29 Jul 2024 13:51 )             39.6     07-29    136  |  103  |  24<H>  ----------------------------<  332<H>  3.4<L>   |  23  |  1.20    Ca    9.0      29 Jul 2024 17:01    TPro  6.7  /  Alb  3.5  /  TBili  0.3  /  DBili  x   /  AST  19  /  ALT  16  /  AlkPhos  213<H>  07-29    PT/INR - ( 29 Jul 2024 13:51 )   PT: 11.0 sec;   INR: 0.96          PTT - ( 29 Jul 2024 13:51 )  PTT:25.4 sec  Urinalysis Basic - ( 29 Jul 2024 17:01 )    Color: x / Appearance: x / SG: x / pH: x  Gluc: 332 mg/dL / Ketone: x  / Bili: x / Urobili: x   Blood: x / Protein: x / Nitrite: x   Leuk Esterase: x / RBC: x / WBC x   Sq Epi: x / Non Sq Epi: x / Bacteria: x      CAPILLARY BLOOD GLUCOSE      POCT Blood Glucose.: 289 mg/dL (29 Jul 2024 21:29)  POCT Blood Glucose.: 232 mg/dL (29 Jul 2024 17:59)  POCT Blood Glucose.: 319 mg/dL (29 Jul 2024 16:56)  POCT Blood Glucose.: >600 mg/dL (29 Jul 2024 15:53)  POCT Blood Glucose.: >600 mg/dL (29 Jul 2024 13:23)        Urinalysis Basic - ( 29 Jul 2024 17:01 )    Color: x / Appearance: x / SG: x / pH: x  Gluc: 332 mg/dL / Ketone: x  / Bili: x / Urobili: x   Blood: x / Protein: x / Nitrite: x   Leuk Esterase: x / RBC: x / WBC x   Sq Epi: x / Non Sq Epi: x / Bacteria: x        RADIOLOGY, EKG, & ADDITIONAL TESTS:      Imaging Personally Reviewed:  [x] YES  [ ] NO      HEALTH ISSUES - PROBLEM Dx:  Other encephalopathy    Acute hyperglycemia    Type II diabetes mellitus    NSCLC metastatic to brain    Acute kidney injury superimposed on CKD    Hypertensive emergency    Hyperlipidemia    Prophylactic measure    Elevated troponin    Lower extremity edema

## 2024-07-30 NOTE — CONSULT NOTE ADULT - ASSESSMENT
73 yo F w/ PMH of T2DM, NSCLC adenocarcinoma with mets to brain (Dx 2023), s/p XRT, on chemo, R hip replacement, RA, CKD IIIA, HTN and HLD p/w mechanical fall and found to have hypertensive urgency and hyperglycemia

## 2024-07-30 NOTE — PROGRESS NOTE ADULT - ASSESSMENT
Pt  is a 75 yo F w/ PMH of T2DM, NSCLC adenocarcinoma with mets to brain (Dx 2023), s/p XRT, on chemo, R hip replacement, RA, CKD IIIA, HTN and HLD p/w mechanical fall and found to have hypertensive urgency and hyperglycemia Pt  is a 75 yo F w/ PMH of T2DM, NSCLC adenocarcinoma with mets to brain (Dx 2023), s/p XRT currently on weekly chemo, R hip replacement, RA, CKD IIIA, HTN and HLD who presents after witnessed mechanical fall, admitted for AMS, hypertensive urgency, and hyperglycemia (FSG>600).

## 2024-07-30 NOTE — DIETITIAN INITIAL EVALUATION ADULT - PERTINENT LABORATORY DATA
07-30    140  |  104  |  16  ----------------------------<  220<H>  4.1   |  26  |  1.10    Ca    9.0      30 Jul 2024 05:30  Phos  2.0     07-30  Mg     1.3     07-30    TPro  6.2  /  Alb  3.2<L>  /  TBili  0.3  /  DBili  x   /  AST  21  /  ALT  13  /  AlkPhos  106  07-30  POCT Blood Glucose.: 449 mg/dL (07-30-24 @ 14:24)  A1C with Estimated Average Glucose Result: 12.0 % (07-30-24 @ 05:30)  A1C with Estimated Average Glucose Result: 11.5 % (12-27-23 @ 05:30)  A1C with Estimated Average Glucose Result: 10.0 % (10-08-23 @ 05:30)

## 2024-07-30 NOTE — CONSULT NOTE ADULT - SUBJECTIVE AND OBJECTIVE BOX
Patient is a 74y old  Female who presents with a chief complaint of AMS and fall (30 Jul 2024 05:57)      HPI:  Pt  is a 73 yo F w/ PMH of T2DM, NSCLC adenocarcinoma with mets to brain (Dx 2023), s/p XRT, on chemo, R hip replacement, RA, CKD IIIA, HTN and HLD presenting for witnessed mechanical fall while waiting for elevator at [address] [place] [address]. Pt does not recall the events leading up to her hospitalization. Per ED note, pt was altered initially and unable to give history, name, date. Pt does report having multiple falls in the past and but still cannot recall what happened. Pt denies all ROS.    In the ED, pt was a stroke code but pt upon neurological examination, pt was AOx3 with no focal neurological deficits does have narrow, shuffling gate.    Vitals: 98.5, -->81 /85-->223/95-->149/74 RR18 SpO2 96%  labs: CBC wnl, FSG >600, troponin 63, Na131, BUN 30 Cr 1.38 glucose 716 BHB 0.5  UA: trace ketones, 8 WBC, numerous bacteria,   CTH: neg for acute process, brain mets  CTA H/N: mild stenosis fo the distal L M1 segment  EKG: NSR with PVCs, QTc 441  Intervention: CTX 1g, labetalol 20mg x2, nifedipine 30mg x1, Kcl 40mg x1  Consults: NSGY, neurology - stroke code (29 Jul 2024 22:58)    PAST MEDICAL & SURGICAL HISTORY:  HTN (hypertension)      HLD (hyperlipidemia)      DM (diabetes mellitus), type 2      Rheumatoid arthritis      Stage 4 chronic kidney disease      Degenerative joint disease (DJD) of lumbar spine      Osteopenia      Lung cancer metastatic to brain      S/P total right hip arthroplasty        MEDICATIONS  (STANDING):  atorvastatin 40 milliGRAM(s) Oral at bedtime  dextrose 5%. 1000 milliLiter(s) (50 mL/Hr) IV Continuous <Continuous>  dextrose 5%. 1000 milliLiter(s) (100 mL/Hr) IV Continuous <Continuous>  dextrose 50% Injectable 12.5 Gram(s) IV Push once  dextrose 50% Injectable 25 Gram(s) IV Push once  dextrose 50% Injectable 25 Gram(s) IV Push once  enoxaparin Injectable 40 milliGRAM(s) SubCutaneous every 24 hours  glucagon  Injectable 1 milliGRAM(s) IntraMuscular once  insulin lispro (ADMELOG) corrective regimen sliding scale   SubCutaneous Before meals and at bedtime  insulin lispro Injectable (ADMELOG) 9 Unit(s) SubCutaneous three times a day before meals  metoprolol succinate  milliGRAM(s) Oral daily  NIFEdipine XL 60 milliGRAM(s) Oral every 24 hours  nystatin Cream 1 Application(s) Topical every 12 hours    MEDICATIONS  (PRN):  dextrose Oral Gel 15 Gram(s) Oral once PRN Blood Glucose LESS THAN 70 milliGRAM(s)/deciliter        FAMILY HISTORY:  No pertinent family history in first degree relatives        CBC Full  -  ( 29 Jul 2024 13:51 )  WBC Count : 6.22 K/uL  RBC Count : 4.61 M/uL  Hemoglobin : 12.9 g/dL  Hematocrit : 39.6 %  Platelet Count - Automated : 182 K/uL  Mean Cell Volume : 85.9 fl  Mean Cell Hemoglobin : 28.0 pg  Mean Cell Hemoglobin Concentration : 32.6 gm/dL  Auto Neutrophil # : 4.38 K/uL  Auto Lymphocyte # : 0.98 K/uL  Auto Monocyte # : 0.65 K/uL  Auto Eosinophil # : 0.00 K/uL  Auto Basophil # : 0.06 K/uL  Auto Neutrophil % : 70.4 %  Auto Lymphocyte % : 15.7 %  Auto Monocyte % : 10.4 %  Auto Eosinophil % : 0.0 %  Auto Basophil % : 0.9 %      07-29    136  |  103  |  24<H>  ----------------------------<  332<H>  3.4<L>   |  23  |  1.20    Ca    9.0      29 Jul 2024 17:01    TPro  6.7  /  Alb  3.5  /  TBili  0.3  /  DBili  x   /  AST  19  /  ALT  16  /  AlkPhos  213<H>  07-29      Urinalysis Basic - ( 29 Jul 2024 17:01 )    Color: x / Appearance: x / SG: x / pH: x  Gluc: 332 mg/dL / Ketone: x  / Bili: x / Urobili: x   Blood: x / Protein: x / Nitrite: x   Leuk Esterase: x / RBC: x / WBC x   Sq Epi: x / Non Sq Epi: x / Bacteria: x        Radiology :     < from: CT Brain Stroke Protocol (07.29.24 @ 14:21) >    ACC: [id number] EXAM:  CT ANGIO BRAIN STROKE PROTC IC   ORDERED BY: KAMERON D   DIRECTOR     ACC: [id number] EXAM:  CT ANGIO NECK STROKE PROTCL IC   ORDERED BY: KAMERON D   DIRECTOR     ACC: [id number] EXAM:  CT BRAIN STROKE PROTOCOL   ORDERED BY: KAMERON D   DIRECTOR     ACC: [id number] EXAM:  CT BRAIN PERFUSION MAPS STROKE   ORDERED BY: KAMERON WERNER   DIRECTOR     PROCEDURE DATE:  07/29/2024          INTERPRETATION:  CLINICAL INFORMATION: Stroke Code. Altered mental   status, history of intracranial metastasis    COMPARISON: CT head 12/26/2023 and MRI brain 6/14/2024.    CONTRAST:  IV Contrast: Isovue 370  40 cc administered (accession [id number]), 80 cc   administered (accession [id number])  60 cc discarded (accession [id number]),   20 cc discarded (accession [id number])  Complications: None reported at time of study completion    TECHNIQUE: CT of the head was performed with multiplanar reformats   without IV contrast. CT perfusion and CTA of the head and neck were   performed following the intravenous administration of IV contrast. MIP   reconstructions were performed on a separate workstation and reviewed.   RAPID software was utilized for perfusion analysis.    FINDINGS:    CT BRAIN:    VENTRICLES AND SULCI: Age appropriate involutional changes.  INTRA-AXIAL: Gray-white matter differentiation is grossly preserved.   There are stable chronic and cephalization gliosis in the right occipital   lobe. Patchy hypodensities in the periventricular subcortical white   matter which may be on the basis of chronic microvascular ischemic   changes. Previously noted peripherally enhancing lesions in the bilateral   temporal parietal regions better seen on recent MRI brain. There is no   mass effect. There is no acute hemorrhage.  EXTRA-AXIAL: No mass or fluid collection. Basal cisterns are normal in   appearance.    VISUALIZED SINUSES:  Clear.  TYMPANOMASTOID CAVITIES:  Clear.  VISUALIZED ORBITS: Bilateral lens replacement.  CALVARIUM: Intact.      CT PERFUSION:    TECHNICAL LIMITATIONS: None.    CBF<30%: 0 mL  TMAX>6s: 3 mL in the right occipital region possibly artifactual  MISMATCH VOLUME: 3 mL  MISMATCH RATIO: None.    MISCELLANEOUS: None.      CTA NECK:    AORTIC ARCH AND VISUALIZED GREAT VESSELS: Within normal limits.    RIGHT:  COMMON CAROTID ARTERY: No significant stenosis to the carotid bifurcation.  INTERNAL CAROTID ARTERY: No significant stenosis based on NASCET criteria.  VERTEBRAL ARTERY: Normal in course and caliber to the intracranial   circulation.    LEFT:  COMMON CAROTID ARTERY: No significant stenosis to the carotid bifurcation.  INTERNAL CAROTID ARTERY: No significant stenosis based on NASCET criteria.  VERTEBRAL ARTERY: Normal in course and caliber to the intracranial   circulation.    VISUALIZED LUNGS: Clear.    MISCELLANEOUS: Heterogeneous right thyroid nodules again noted..    CAROTID STENOSIS REFERENCE: Percent (%) stenosis is expressed in terms of   NASCET Criteria. (NASCET = 100x1-(N/D)). N=greatest narrowing. D=normal   distal diameter - MILD = <50% stenosis. - MODERATE = 50-69% stenosis. -   SEVERE = 70-89% stenosis. - HAIRLINE/CRITICAL = 90-99% stenosis. -   OCCLUDED = 100% stenosis.      CTA HEAD:    INTERNAL CAROTID ARTERIES: Bilateral petrous, precavernous, cavernous,   and supraclinoid regions are patent. There is calcified plaque of the   cavernous and supraclinoid segments without high-grade stenosis..    [employer] OF [place]: No aneurysm identified. Tiny aneurysms can be beyond   the resolution of CTA technique.    ANTERIOR CEREBRAL ARTERIES: No significant stenosis or occlusion.  MIDDLE CEREBRAL ARTERIES: The bilateral middle cerebral arteries are   patent. There is irregularity with mild stenosis the distal left M1   segment.  POSTERIOR CEREBRAL ARTERIES: No significant stenosis or occlusion.    DISTAL VERTEBRAL / BASILAR ARTERIES: No significant stenosis or occlusion.    VENOUS STRUCTURES:    MISCELLANEOUS: No other vascular abnormality is seen.      IMPRESSION:    CT HEAD:  No acute intracranial hemorrhage.  Previously noted peripherally   enhancing lesions in the bilateral temporal parietal regions better seen   on recent MRI brain. There is no mass effect.    Findings were discussed with MARIBELL Brizuela 7/29/2024 2:13 PM by Dr. Castro   with read back confirmation.    CT PERFUSION:  CT perfusion metrics as above.    CTA NECK:  No evidence of significant stenosis or occlusion.    CTA HEAD:  No large vessel occlusion. Mild stenosis of the distal left M1 segment..             Review of Systems : per HPI         Vital Signs Last 24 Hrs  T(C): 37 (30 Jul 2024 05:21), Max: 37 (29 Jul 2024 18:15)  T(F): 98.6 (30 Jul 2024 05:21), Max: 98.6 (29 Jul 2024 18:15)  HR: 71 (30 Jul 2024 05:21) (59 - 108)  BP: 126/78 (30 Jul 2024 05:21) (126/78 - 230/100)  BP(mean): 102 (29 Jul 2024 21:00) (102 - 144)  RR: 18 (30 Jul 2024 05:21) (17 - 18)  SpO2: 96% (30 Jul 2024 05:21) (95% - 99%)    Parameters below as of 30 Jul 2024 05:21  Patient On (Oxygen Delivery Method): room air            Physical Exam:  on  CONTACT MDRO Urine isolation ,  74 y o woman  lying comfortably in semi Zee's position , awake , alert , no acute complaints , feels tired     Head: normocephalic , atraumatic    Eyes: PERRLA , EOMI , no nystagmus , sclera anicteric    ENT / FACE: neg nasal discharge , uvula midline , no oropharyngeal erythema / exudate    Neck: supple , negative JVD , negative carotid bruits , no thyromegaly    Chest: CTA bilaterally , neg wheeze / rhonchi / rales / crackles / egophany    Cardiovascular: regular rate and rhythm , neg murmurs / rubs / gallops    Abdomen: soft , non distended , no tenderness to palpation in all 4 quadrants ,  normal bowel sounds     Extremities: WWP , neg cyanosis /clubbing / edema     Neurologic Exam:     Alert and oriented to person , place , date/year , speech fluent w/o dysarthria , follows commands     Cranial Nerves:           II:                         pupils equal , round and reactive to light , visual fields intact         III/ IV/VI:             extraocular movements intact , neg nystagmus , neg ptosis        V:                        facial sensation intact , V1-3 normal        VII:                      face symmetric , no droop , normal eye closure and smile        VIII:                     hearing intact to finger rub bilaterally        IX and X:             no hoarseness , gag intact , palate/ uvula rise symmetrically        XI:                       SCM / trapezius strength intact bilateral        XII:                      no tongue deviation    Motor Exam:        > 3+/5 x 4 extremities , without drift     Sensation:         intact to light touch x 4 extremities                            no neglect or extinction on double simultaneous testing    DTR:           biceps/brachioradialis: equal                            patella/ankle: equal          neg Babinski         Coordination:            Finger to Nose:  neg dysmetria bilaterally        Gait:  not tested         PM&R Impression: admitted for s/p fall , found to have hypertensive urgency and hyperglycemia    - no acute pathology on CT brain imaging      - no focal neurologic deficits       Recommendations / Plan:       1) Physical / Occupational therapy focusing on therapeutic exercises , equipment evaluation , bed mobility/transfer out of bed evaluation , progressive ambulation with assistive devices prn .    2) Current disposition plan recommendation:    pending functional progress

## 2024-07-30 NOTE — DIETITIAN INITIAL EVALUATION ADULT - PROBLEM SELECTOR PLAN 1
Pt p/w mechanical fall and AOx0 on admission. Stroke code called in the ED. CTH without acute pathology of stroke but notable for brain mets. Neurological exam significant for shuffling and narrow gait. Reportedly had past mechanical falls but no LOC, UA with 8 WBC and many bacteria but asymptomatic per patient. s/p CTX 1g. Etiology of encephalopathy possibly 2/2 brain mets vs infection (low suspicion) vs hypertensive emergency. Pt is AOx3 now.  TTE 2023:  Normal left ventricular size and systolic function. Moderate symmetric left ventricular hypertrophy  - f/u UCx  - f/u TTE  - f/u daily BMP  - PT/OT eval

## 2024-07-30 NOTE — CONSULT NOTE ADULT - CONVERSATION DETAILS
Discussed code status with patient. She endorsed that if necessary, she would want all aggressive interventions, including intubation and resuscitation. She also endorsed that she wants her daughter to be her decision maker if she loses the capacity to make decisions for herself.

## 2024-07-30 NOTE — DIETITIAN INITIAL EVALUATION ADULT - PROBLEM SELECTOR PLAN 2
On admission BP elevated to 223/95. EKG NSR with PVCs .S/p  labetalol 20mg x2, nifedipine 30mg x1. Home meds based on HIEE and surescripts: Amlodipine 10mg qd, Metoprolol Succinate ER 100mg qd, nifedipine ER 60mg qd  - c/w home meds  - formal med rec in the AM  - f/u TTE

## 2024-07-30 NOTE — CONSULT NOTE ADULT - SUBJECTIVE AND OBJECTIVE BOX
Patient is a 74y old  Female who presents with a chief complaint of AMS and fall (30 Jul 2024 07:31)    HPI:  Pt  is a 73 yo F w/ PMH of T2DM, NSCLC adenocarcinoma with mets to brain (Dx 2023), s/p XRT, on chemo, R hip replacement, RA, CKD IIIA, HTN and HLD presenting for witnessed mechanical [facility] Pt does not recall the events leading up to her hospitalization. Per ED note, pt was altered initially and unable to give history, name, date. Pt does report having multiple falls in the past and but still cannot recall what happened. Pt denies all ROS.    In the ED, pt was a stroke code but pt upon neurological examination, pt was AOx3 with no focal neurological deficits does have narrow, shuffling gate.    Vitals: 98.5, -->81 /85-->223/95-->149/74 RR18 SpO2 96%  labs: CBC wnl, FSG >600, troponin 63, Na131, BUN 30 Cr 1.38 glucose 716 BHB 0.5  UA: trace ketones, 8 WBC, numerous bacteria,   CTH: neg for acute process, brain mets  CTA H/N: mild stenosis fo the distal L M1 segment  EKG: NSR with PVCs, QTc 441  Intervention: CTX 1g, labetalol 20mg x2, nifedipine 30mg x1, Kcl 40mg x1  Consults: NSGY, neurology - stroke code (29 Jul 2024 22:58)    HEMATOLOGY/ONCOLOGY HISTORY:    PSHx:  FHx:  SocHx:    REVIEW OF SYSTEMS:  All other review of systems is negative unless indicated above.    OBJECTIVE:  T(C): 37 (07-30-24 @ 05:21), Max: 37 (07-29-24 @ 18:15)  HR: 71 (07-30-24 @ 05:21) (59 - 108)  BP: 126/78 (07-30-24 @ 05:21) (126/78 - 230/100)  RR: 18 (07-30-24 @ 05:21) (17 - 18)  SpO2: 96% (07-30-24 @ 05:21) (95% - 99%)  Daily Height in cm: 160.02 (29 Jul 2024 13:19)    Daily     Physical Exam:  General: in no acute distress  Eyes: EOMI intact bilaterally. Anicteric sclerae, moist conjunctivae  HENT: Moist mucous membranes  Neck: Trachea midline, supple  Lungs: CTA B/L. No wheezes, rales, or rhonchi  Cardiovascular: RRR. No murmurs, rubs, or gallops  Abdomen: Soft, non-tender non-distended; No rebound or guarding  Extremities: WWP, No clubbing, cyanosis or edema  MSK: No midline bony tenderness. No CVA tenderness bilaterally  Neurological: Alert and oriented x3  Skin: Warm and dry. No obvious rash     Medications:  MEDICATIONS  (STANDING):  atorvastatin 40 milliGRAM(s) Oral at bedtime  dextrose 5%. 1000 milliLiter(s) (100 mL/Hr) IV Continuous <Continuous>  dextrose 5%. 1000 milliLiter(s) (50 mL/Hr) IV Continuous <Continuous>  dextrose 50% Injectable 12.5 Gram(s) IV Push once  dextrose 50% Injectable 25 Gram(s) IV Push once  dextrose 50% Injectable 25 Gram(s) IV Push once  enoxaparin Injectable 40 milliGRAM(s) SubCutaneous every 24 hours  glucagon  Injectable 1 milliGRAM(s) IntraMuscular once  insulin lispro (ADMELOG) corrective regimen sliding scale   SubCutaneous Before meals and at bedtime  insulin lispro Injectable (ADMELOG) 9 Unit(s) SubCutaneous three times a day before meals  metoprolol succinate  milliGRAM(s) Oral daily  NIFEdipine XL 60 milliGRAM(s) Oral every 24 hours  nystatin Cream 1 Application(s) Topical every 12 hours    MEDICATIONS  (PRN):  dextrose Oral Gel 15 Gram(s) Oral once PRN Blood Glucose LESS THAN 70 milliGRAM(s)/deciliter      Labs:                        12.4   7.10  )-----------( 179      ( 30 Jul 2024 05:30 )             38.7     07-30    140  |  104  |  x   ----------------------------<  220<H>  4.1   |  26  |  1.10    Ca    9.0      30 Jul 2024 05:30    TPro  6.7  /  Alb  3.5  /  TBili  0.3  /  DBili  x   /  AST  19  /  ALT  16  /  AlkPhos  213<H>  07-29    PT/INR - ( 29 Jul 2024 13:51 )   PT: 11.0 sec;   INR: 0.96      PTT - ( 29 Jul 2024 13:51 )  PTT:25.4 sec    Urinalysis Basic - ( 30 Jul 2024 05:30 )    Color: x / Appearance: x / SG: x / pH: x  Gluc: 220 mg/dL / Ketone: x  / Bili: x / Urobili: x   Blood: x / Protein: x / Nitrite: x   Leuk Esterase: x / RBC: x / WBC x   Sq Epi: x / Non Sq Epi: x / Bacteria: x    Radiology: Reviewed Patient is a 74y old  Female who presents with a chief complaint of AMS and fall (30 Jul 2024 07:31)    HPI:  Pt  is a 75 yo F w/ PMH of T2DM, NSCLC adenocarcinoma with mets to brain (Dx 2023), s/p XRT, on chemo, R hip replacement, RA, CKD IIIA, HTN and HLD presenting for witnessed mechanical fall while waiting for elevator at [address] [place] [address]. Pt does not recall the events leading up to her hospitalization. Per ED note, pt was altered initially and unable to give history, name, date. Pt does report having multiple falls in the past and but still cannot recall what happened. Pt denies all ROS.    In the ED, pt was a stroke code but pt upon neurological examination, pt was AOx3 with no focal neurological deficits does have narrow, shuffling gate.    Vitals: 98.5, -->81 /85-->223/95-->149/74 RR18 SpO2 96%  labs: CBC wnl, FSG >600, troponin 63, Na131, BUN 30 Cr 1.38 glucose 716 BHB 0.5  UA: trace ketones, 8 WBC, numerous bacteria,   CTH: neg for acute process, brain mets  CTA H/N: mild stenosis fo the distal L M1 segment  EKG: NSR with PVCs, QTc 441  Intervention: CTX 1g, labetalol 20mg x2, nifedipine 30mg x1, Kcl 40mg x1  Consults: NSGY, neurology - stroke code (29 Jul 2024 22:58)    HEMATOLOGY/ONCOLOGY HISTORY: (per chart review) 75 y/o F, former smoker, with hx of CKD, type 2 diabetes mellitus, Bell's palsy, HTN, RA, metastatic NSCLC, brain mets (dx in September 2023) who presented to [facility] ED after a witnessed mechanical fall while waiting for the elevator at [facility] to attend her radiology appointment for treatment for brain cancer. Patient suddenly fell. At that time (about 12:55PM), she was oriented to self only. She was unaware of falling while waiting for the elevator but remembers all the events leading up to the fall and afterwards when arriving to the ED. Of note, she lives alone and reports having multiple falls at home. She ambulates with a rolling walker. Stroke code called upon arrival to the ED. CTA head & neck revealed mild stenosis of the distal left M1 segment No TNK was given due to unknown LKW and active brain cancer. Thrombectomy not offered due to low suspicion of large vessel occlusion. Upon examination by neurosurgery, pt was alert and oriented x3. Denied LOC, dizziness, lightheadedness, and blurry vision.      PSHx:  FHx:  SocHx:    REVIEW OF SYSTEMS:  All other review of systems is negative unless indicated above.    OBJECTIVE:  T(C): 37 (07-30-24 @ 05:21), Max: 37 (07-29-24 @ 18:15)  HR: 71 (07-30-24 @ 05:21) (59 - 108)  BP: 126/78 (07-30-24 @ 05:21) (126/78 - 230/100)  RR: 18 (07-30-24 @ 05:21) (17 - 18)  SpO2: 96% (07-30-24 @ 05:21) (95% - 99%)  Daily Height in cm: 160.02 (29 Jul 2024 13:19)    Daily     Physical Exam:  General: in no acute distress  Eyes: EOMI intact bilaterally. Anicteric sclerae, moist conjunctivae  HENT: Moist mucous membranes  Neck: Trachea midline, supple  Lungs: CTA B/L. No wheezes, rales, or rhonchi  Cardiovascular: RRR. No murmurs, rubs, or gallops  Abdomen: Soft, non-tender non-distended; No rebound or guarding  Extremities: WWP, No clubbing, cyanosis or edema  MSK: No midline bony tenderness. No CVA tenderness bilaterally  Neurological: Alert and oriented x3  Skin: Warm and dry. No obvious rash     Medications:  MEDICATIONS  (STANDING):  atorvastatin 40 milliGRAM(s) Oral at bedtime  dextrose 5%. 1000 milliLiter(s) (100 mL/Hr) IV Continuous <Continuous>  dextrose 5%. 1000 milliLiter(s) (50 mL/Hr) IV Continuous <Continuous>  dextrose 50% Injectable 12.5 Gram(s) IV Push once  dextrose 50% Injectable 25 Gram(s) IV Push once  dextrose 50% Injectable 25 Gram(s) IV Push once  enoxaparin Injectable 40 milliGRAM(s) SubCutaneous every 24 hours  glucagon  Injectable 1 milliGRAM(s) IntraMuscular once  insulin lispro (ADMELOG) corrective regimen sliding scale   SubCutaneous Before meals and at bedtime  insulin lispro Injectable (ADMELOG) 9 Unit(s) SubCutaneous three times a day before meals  metoprolol succinate  milliGRAM(s) Oral daily  NIFEdipine XL 60 milliGRAM(s) Oral every 24 hours  nystatin Cream 1 Application(s) Topical every 12 hours    MEDICATIONS  (PRN):  dextrose Oral Gel 15 Gram(s) Oral once PRN Blood Glucose LESS THAN 70 milliGRAM(s)/deciliter      Labs:                        12.4   7.10  )-----------( 179      ( 30 Jul 2024 05:30 )             38.7     07-30    140  |  104  |  x   ----------------------------<  220<H>  4.1   |  26  |  1.10    Ca    9.0      30 Jul 2024 05:30    TPro  6.7  /  Alb  3.5  /  TBili  0.3  /  DBili  x   /  AST  19  /  ALT  16  /  AlkPhos  213<H>  07-29    PT/INR - ( 29 Jul 2024 13:51 )   PT: 11.0 sec;   INR: 0.96      PTT - ( 29 Jul 2024 13:51 )  PTT:25.4 sec    Urinalysis Basic - ( 30 Jul 2024 05:30 )    Color: x / Appearance: x / SG: x / pH: x  Gluc: 220 mg/dL / Ketone: x  / Bili: x / Urobili: x   Blood: x / Protein: x / Nitrite: x   Leuk Esterase: x / RBC: x / WBC x   Sq Epi: x / Non Sq Epi: x / Bacteria: x    Radiology: Reviewed    Onc Hx:  Ex-smoker with history of CKD, baseline creatinine is around 2, diabetes, rheumatoid arthritis, HTN.  She was experiencing 1 week of daily falls with lower extremity weakness without dizziness, was admitted to [facility].  MRI brain showed 0.9 cm right frontal lobe mass with surrounding vasogenic edema.  CT chest abdomen pelvis revealed left upper lobe mass 2.6 x 4.1 cm in posterior aspect of left upper lobe abutting major fissure. The mass extends to adjacent left hilum and laterally to left lateral pleura. 1.5 cm left lobe of liver lesion. 1.2 cm pancreatic body cystic lesion.  7/14/2023 bronchoscopy-lingular biopsy poorly differentiated adenocarcinoma, positive TTF-1, TMB 17.3, PD-L1 negative.  Tier II: Variants of Potential Clinical Significance  STK11 p.([id number])  PIK3CA p.([id number])  TP53 p.([id number])  Tier III: Variants of Unknown Clinical Significance  ALK p.([id number])  8/2023: She followed up at [facility] where she received her first chemotherapy cycle, Taxol/pembrolizumab only due to carboplatin shortage. Taxol was given because her labs there showed a GFR<45, excluding her from being able to receive pemetrexed. She tolerated treatment reasonably well, without any neuropathy or cytopenias. She lost he hair after first chemotherapy cycle.  9/5/2023: PET at [facility]: Left upper lobe perihilar hypermetabolic mass consistent with biopsy-proven malignancy. Mildly FDG avid right thyroid nodule, correlate with thyroid ultrasound.  9/7/2023: Received cycle 1 paclitaxel/pembrolizumab at [facility] (Cr 1.2 there)  10/10/2023: MRIB: Since 7/11/2023, right posterior frontal enhancing lesion and vasogenic edema are completely resolved as a favorable response to therapy. No new enhancing lesion.  ?  Disease: NSCLC    Pathology: Adenocarcinoma     [facility] ([facility]): Dr.Rosa Jeffrey  NeuroSx: Dr.D'Amico  [facility]: Dr. Wernicke     Current Treatment Status:.  9/7/2023: Taxol/pembrolizumab C1 given at [facility] (no carboplatin due to national shortage)  10/26/2023: C2 carboplatin/Taxol/pembrolizumab Patient is a 74y old  Female who presents with a chief complaint of AMS and fall (30 Jul 2024 07:31)    HPI:  Pt  is a 73 yo F w/ PMH of T2DM, NSCLC adenocarcinoma with mets to brain (Dx 2023), s/p XRT, on chemo, R hip replacement, RA, CKD IIIA, HTN and HLD presenting for witnessed mechanical fall while waiting for elevator at [address] [place] [address]. Pt does not recall the events leading up to her hospitalization. Per ED note, pt was altered initially and unable to give history, name, date. Pt does report having multiple falls in the past and but still cannot recall what happened. Pt denies all ROS.    In the ED, pt was a stroke code but pt upon neurological examination, pt was AOx3 with no focal neurological deficits does have narrow, shuffling gate.    Vitals: 98.5, -->81 /85-->223/95-->149/74 RR18 SpO2 96%  labs: CBC wnl, FSG >600, troponin 63, Na131, BUN 30 Cr 1.38 glucose 716 BHB 0.5  UA: trace ketones, 8 WBC, numerous bacteria,   CTH: neg for acute process, brain mets  CTA H/N: mild stenosis fo the distal L M1 segment  EKG: NSR with PVCs, QTc 441  Intervention: CTX 1g, labetalol 20mg x2, nifedipine 30mg x1, Kcl 40mg x1  Consults: NSGY, neurology - stroke code (29 Jul 2024 22:58)    HPI (NSGY): 75 y/o F, former smoker, with hx of CKD, type 2 diabetes mellitus, Bell's palsy, HTN, RA, metastatic NSCLC, brain mets (dx in September 2023) who presented to [facility] ED after a witnessed mechanical fall while waiting for the elevator at [facility] to attend her radiology appointment for treatment for brain cancer. Patient suddenly fell. At that time (about 12:55PM), she was oriented to self only. She was unaware of falling while waiting for the elevator but remembers all the events leading up to the fall and afterwards when arriving to the ED. Of note, she lives alone and reports having multiple falls at home. She ambulates with a rolling walker. Stroke code called upon arrival to the ED. CTA head & neck revealed mild stenosis of the distal left M1 segment No TNK was given due to unknown LKW and active brain cancer. Thrombectomy not offered due to low suspicion of large vessel occlusion. Upon examination by neurosurgery, pt was alert and oriented x3. Denied LOC, dizziness, lightheadedness, and blurry vision.      HEMATOLOGY/ONCOLOGY HISTORY:     PSHx:  FHx:  SocHx:    REVIEW OF SYSTEMS:  All other review of systems is negative unless indicated above.    OBJECTIVE:  T(C): 37 (07-30-24 @ 05:21), Max: 37 (07-29-24 @ 18:15)  HR: 71 (07-30-24 @ 05:21) (59 - 108)  BP: 126/78 (07-30-24 @ 05:21) (126/78 - 230/100)  RR: 18 (07-30-24 @ 05:21) (17 - 18)  SpO2: 96% (07-30-24 @ 05:21) (95% - 99%)  Daily Height in cm: 160.02 (29 Jul 2024 13:19)    Daily     Physical Exam:  General: in no acute distress  Eyes: EOMI intact bilaterally. Anicteric sclerae, moist conjunctivae  HENT: Moist mucous membranes  Neck: Trachea midline, supple  Lungs: CTA B/L. No wheezes, rales, or rhonchi  Cardiovascular: RRR. No murmurs, rubs, or gallops  Abdomen: Soft, non-tender non-distended; No rebound or guarding  Extremities: WWP, No clubbing, cyanosis or edema  MSK: No midline bony tenderness. No CVA tenderness bilaterally  Neurological: Alert and oriented x3  Skin: Warm and dry. No obvious rash     Medications:  MEDICATIONS  (STANDING):  atorvastatin 40 milliGRAM(s) Oral at bedtime  dextrose 5%. 1000 milliLiter(s) (100 mL/Hr) IV Continuous <Continuous>  dextrose 5%. 1000 milliLiter(s) (50 mL/Hr) IV Continuous <Continuous>  dextrose 50% Injectable 12.5 Gram(s) IV Push once  dextrose 50% Injectable 25 Gram(s) IV Push once  dextrose 50% Injectable 25 Gram(s) IV Push once  enoxaparin Injectable 40 milliGRAM(s) SubCutaneous every 24 hours  glucagon  Injectable 1 milliGRAM(s) IntraMuscular once  insulin lispro (ADMELOG) corrective regimen sliding scale   SubCutaneous Before meals and at bedtime  insulin lispro Injectable (ADMELOG) 9 Unit(s) SubCutaneous three times a day before meals  metoprolol succinate  milliGRAM(s) Oral daily  NIFEdipine XL 60 milliGRAM(s) Oral every 24 hours  nystatin Cream 1 Application(s) Topical every 12 hours    MEDICATIONS  (PRN):  dextrose Oral Gel 15 Gram(s) Oral once PRN Blood Glucose LESS THAN 70 milliGRAM(s)/deciliter      Labs:                        12.4   7.10  )-----------( 179      ( 30 Jul 2024 05:30 )             38.7     07-30    140  |  104  |  x   ----------------------------<  220<H>  4.1   |  26  |  1.10    Ca    9.0      30 Jul 2024 05:30    TPro  6.7  /  Alb  3.5  /  TBili  0.3  /  DBili  x   /  AST  19  /  ALT  16  /  AlkPhos  213<H>  07-29    PT/INR - ( 29 Jul 2024 13:51 )   PT: 11.0 sec;   INR: 0.96      PTT - ( 29 Jul 2024 13:51 )  PTT:25.4 sec    Urinalysis Basic - ( 30 Jul 2024 05:30 )    Color: x / Appearance: x / SG: x / pH: x  Gluc: 220 mg/dL / Ketone: x  / Bili: x / Urobili: x   Blood: x / Protein: x / Nitrite: x   Leuk Esterase: x / RBC: x / WBC x   Sq Epi: x / Non Sq Epi: x / Bacteria: x    Radiology: Reviewed    Onc Hx:  Ex-smoker with history of CKD, baseline creatinine is around 2, diabetes, rheumatoid arthritis, HTN.  She was experiencing 1 week of daily falls with lower extremity weakness without dizziness, was admitted to [facility].  MRI brain showed 0.9 cm right frontal lobe mass with surrounding vasogenic edema.  CT chest abdomen pelvis revealed left upper lobe mass 2.6 x 4.1 cm in posterior aspect of left upper lobe abutting major fissure. The mass extends to adjacent left hilum and laterally to left lateral pleura. 1.5 cm left lobe of liver lesion. 1.2 cm pancreatic body cystic lesion.  7/14/2023 bronchoscopy-lingular biopsy poorly differentiated adenocarcinoma, positive TTF-1, TMB 17.3, PD-L1 negative.  Tier II: Variants of Potential Clinical Significance  STK11 p.([id number])  PIK3CA p.([id number])  TP53 p.([id number])  Tier III: Variants of Unknown Clinical Significance  ALK p.([id number])  8/2023: She followed up at [facility] where she received her first chemotherapy cycle, Taxol/pembrolizumab only due to carboplatin shortage. Taxol was given because her labs there showed a GFR<45, excluding her from being able to receive pemetrexed. She tolerated treatment reasonably well, without any neuropathy or cytopenias. She lost he hair after first chemotherapy cycle.  9/5/2023: PET at [facility]: Left upper lobe perihilar hypermetabolic mass consistent with biopsy-proven malignancy. Mildly FDG avid right thyroid nodule, correlate with thyroid ultrasound.  9/7/2023: Received cycle 1 paclitaxel/pembrolizumab at [facility] (Cr 1.2 there)  10/10/2023: MRIB: Since 7/11/2023, right posterior frontal enhancing lesion and vasogenic edema are completely resolved as a favorable response to therapy. No new enhancing lesion.  ?  Disease: NSCLC    Pathology: Adenocarcinoma     [facility] ([facility]): Dr.Rosa Jeffrey  NeuroSx: Dr.D'Amico  [facility]: Dr. Wernicke     Current Treatment Status:.  9/7/2023: Taxol/pembrolizumab C1 given at [facility] (no carboplatin due to national shortage)  10/26/2023: C2 carboplatin/Taxol/pembrolizumab Patient is a 74y old  Female who presents with a chief complaint of AMS and fall (30 Jul 2024 07:31)    HPI:  Pt  is a 75 yo F w/ PMH of T2DM, NSCLC adenocarcinoma with mets to brain (Dx 2023), s/p XRT, on chemo, R hip replacement, RA, CKD IIIA, HTN and HLD presenting for witnessed mechanical fall while waiting for elevator at [address] [place] [address]. Pt does not recall the events leading up to her hospitalization. Per ED note, pt was altered initially and unable to give history, name, date. Pt does report having multiple falls in the past and but still cannot recall what happened. Pt denies all ROS.    In the ED, pt was a stroke code but pt upon neurological examination, pt was AOx3 with no focal neurological deficits does have narrow, shuffling gate.    Vitals: 98.5, -->81 /85-->223/95-->149/74 RR18 SpO2 96%  labs: CBC wnl, FSG >600, troponin 63, Na131, BUN 30 Cr 1.38 glucose 716 BHB 0.5  UA: trace ketones, 8 WBC, numerous bacteria,   CTH: neg for acute process, brain mets  CTA H/N: mild stenosis fo the distal L M1 segment  EKG: NSR with PVCs, QTc 441  Intervention: CTX 1g, labetalol 20mg x2, nifedipine 30mg x1, Kcl 40mg x1  Consults: NSGY, neurology - stroke code (29 Jul 2024 22:58)    HPI (NSGY): 75 y/o F, former smoker, with hx of CKD, type 2 diabetes mellitus, Bell's palsy, HTN, RA, metastatic NSCLC, brain mets (dx in September 2023) who presented to [facility] ED after a witnessed mechanical fall while waiting for the elevator at [facility] to attend her radiology appointment for treatment for brain cancer. Patient suddenly fell. At that time (about 12:55PM), she was oriented to self only. She was unaware of falling while waiting for the elevator but remembers all the events leading up to the fall and afterwards when arriving to the ED. Of note, she lives alone and reports having multiple falls at home. She ambulates with a rolling walker. Stroke code called upon arrival to the ED. CTA head & neck revealed mild stenosis of the distal left M1 segment No TNK was given due to unknown LKW and active brain cancer. Thrombectomy not offered due to low suspicion of large vessel occlusion. Upon examination by neurosurgery, pt was alert and oriented x3. Denied LOC, dizziness, lightheadedness, and blurry vision.      HEMATOLOGY/ONCOLOGY HISTORY:     PSHx:  FHx:  SocHx:    REVIEW OF SYSTEMS:  All other review of systems is negative unless indicated above.    OBJECTIVE:  T(C): 37 (07-30-24 @ 05:21), Max: 37 (07-29-24 @ 18:15)  HR: 71 (07-30-24 @ 05:21) (59 - 108)  BP: 126/78 (07-30-24 @ 05:21) (126/78 - 230/100)  RR: 18 (07-30-24 @ 05:21) (17 - 18)  SpO2: 96% (07-30-24 @ 05:21) (95% - 99%)  Daily Height in cm: 160.02 (29 Jul 2024 13:19)    Daily     Physical Exam:  General: in no acute distress  Eyes: EOMI intact bilaterally. Anicteric sclerae, moist conjunctivae  HENT: Moist mucous membranes  Neck: Trachea midline, supple  Lungs: CTA B/L. No wheezes, rales, or rhonchi  Cardiovascular: RRR. No murmurs, rubs, or gallops  Abdomen: Soft, non-tender non-distended; No rebound or guarding  Extremities: WWP, No clubbing, cyanosis or edema  MSK: No midline bony tenderness. No CVA tenderness bilaterally  Neurological: Alert and oriented x3  Skin: Warm and dry. No obvious rash     Medications:  MEDICATIONS  (STANDING):  atorvastatin 40 milliGRAM(s) Oral at bedtime  dextrose 5%. 1000 milliLiter(s) (100 mL/Hr) IV Continuous <Continuous>  dextrose 5%. 1000 milliLiter(s) (50 mL/Hr) IV Continuous <Continuous>  dextrose 50% Injectable 12.5 Gram(s) IV Push once  dextrose 50% Injectable 25 Gram(s) IV Push once  dextrose 50% Injectable 25 Gram(s) IV Push once  enoxaparin Injectable 40 milliGRAM(s) SubCutaneous every 24 hours  glucagon  Injectable 1 milliGRAM(s) IntraMuscular once  insulin lispro (ADMELOG) corrective regimen sliding scale   SubCutaneous Before meals and at bedtime  insulin lispro Injectable (ADMELOG) 9 Unit(s) SubCutaneous three times a day before meals  metoprolol succinate  milliGRAM(s) Oral daily  NIFEdipine XL 60 milliGRAM(s) Oral every 24 hours  nystatin Cream 1 Application(s) Topical every 12 hours    MEDICATIONS  (PRN):  dextrose Oral Gel 15 Gram(s) Oral once PRN Blood Glucose LESS THAN 70 milliGRAM(s)/deciliter      Labs:                        12.4   7.10  )-----------( 179      ( 30 Jul 2024 05:30 )             38.7     07-30    140  |  104  |  x   ----------------------------<  220<H>  4.1   |  26  |  1.10    Ca    9.0      30 Jul 2024 05:30    TPro  6.7  /  Alb  3.5  /  TBili  0.3  /  DBili  x   /  AST  19  /  ALT  16  /  AlkPhos  213<H>  07-29    PT/INR - ( 29 Jul 2024 13:51 )   PT: 11.0 sec;   INR: 0.96      PTT - ( 29 Jul 2024 13:51 )  PTT:25.4 sec    Urinalysis Basic - ( 30 Jul 2024 05:30 )    Color: x / Appearance: x / SG: x / pH: x  Gluc: 220 mg/dL / Ketone: x  / Bili: x / Urobili: x   Blood: x / Protein: x / Nitrite: x   Leuk Esterase: x / RBC: x / WBC x   Sq Epi: x / Non Sq Epi: x / Bacteria: x    Radiology: Reviewed    Onc Hx:  Ex-smoker with history of CKD, baseline creatinine is around 2, diabetes, rheumatoid arthritis, HTN.  She was experiencing 1 week of daily falls with lower extremity weakness without dizziness, was admitted to [facility].  MRI brain showed 0.9 cm right frontal lobe mass with surrounding vasogenic edema.  CT chest abdomen pelvis revealed left upper lobe mass 2.6 x 4.1 cm in posterior aspect of left upper lobe abutting major fissure. The mass extends to adjacent left hilum and laterally to left lateral pleura. 1.5 cm left lobe of liver lesion. 1.2 cm pancreatic body cystic lesion.  7/14/2023 bronchoscopy-lingular biopsy poorly differentiated adenocarcinoma, positive TTF-1, TMB 17.3, PD-L1 negative.  Tier II: Variants of Potential Clinical Significance  STK11 p.([id number])  PIK3CA p.([id number])  TP53 p.([id number])  Tier III: Variants of Unknown Clinical Significance  ALK p.([id number])  8/2023: [place] [facility] [place] chemotherapy cycle, Taxol/pembrolizumab only due to carboplatin shortage. Taxol was given because her labs there showed a GFR<45, excluding her from being able to receive pemetrexed. She tolerated treatment reasonably well, without any neuropathy or cytopenias. She lost he hair after first chemotherapy cycle.  9/5/2023: [place] [facility] Left upper lobe perihilar hypermetabolic mass consistent with biopsy-proven malignancy. Mildly FDG avid right thyroid nodule, correlate with thyroid ultrasound.  9/7/2023: Received cycle 1 paclitaxel/[place]embrolizumab [place] [facility] [place] 1.2 there)  10/10/2023: MRIB: Since 7/11/2023, right posterior frontal enhancing lesion and vasogenic edema are completely resolved as a favorable response to therapy. No new enhancing lesion.  2/6/24: CT CAP: Since 7/112023, significantly decreased left upper lobe lesion. No suspicious lymphadenopathy. No new disease in the chest abdomen or pelvis.  2/23/24: MR HEAD: No evidence of abnormal enhancement to suggest residual/recurrent metastatic disease. Chronic lacunar infarctions. Small right temporal occipital chronic infarction. Tiny left cerebellar chronic infarction. Microvascular disease.  6/14/2024:   CT CAP: Interval increase in left upper lobe pulmonary lesion. Suggestion of increase in left hepatic lesion, likely metastatic. Suggestion of increased soft tissue density and enlargement of cervix, not well visualized on this exam. New fluid collection in the subcutaneous tissues of the left gluteal region may represent an evolving hematoma from patient's recent fall in May 2024.  MRIB: Peripherally enhancing lesions are identified as described above. These findings are likely compatible with metastasis (progression of patient's underlying disease process) given patient's history.  ?  Disease: NSCLC    Pathology: Adenocarcinoma     [facility] [place].Rosa Jeffrey  [facility]uro[facility]: Dr.D'Amico  [facility]: Dr. Wernicke     Current Treatment Status:  ·	9/7/23: Taxol/pembrolizumab C1 [place] [facility] (no carboplatin due to national shortage)  ·	10/26/23: C2 carboplatin/Taxol/pembrolizumab  ·	11/16/23 C3 carboplatin/taxol/pembro (held due to hyperglycemia, elevated lfts and hyponatremia)  ·	11/22/23 C3 carboplatin/taxol/pembro  ·	12/13/23 C4 held due to hyperglycemia.  ·	1/9/24: C4 carboplatin/Taxol/pembrolizumab  ·	3/12/24: C1 pembro  ·	4/2/24: C2 pembro  ·	4/23/24: C3 pembro No Show due to hyperglyemia. Pt was seen in ED  ·	5/1/24: C3 pembro  ·	6/6/24: C4 pembro (delayed due to fall and rehab placement post hospitalization)  ·	7/11/24: C1 gemcitabine (dose reduced to 1000 mg/m2). Patient is a 74y old  Female who presents with a chief complaint of AMS and fall (30 Jul 2024 07:31)    HPI:  Pt  is a 73 yo F w/ PMH of T2DM, NSCLC adenocarcinoma with mets to brain (Dx 2023), s/p XRT, on chemo, R hip replacement, RA, CKD IIIA, HTN and HLD presenting for witnessed mechanical fall while waiting for elevator at [address] [place] [address]. Pt does not recall the events leading up to her hospitalization. Per ED note, pt was altered initially and unable to give history, name, date. Pt does report having multiple falls in the past and but still cannot recall what happened. Pt denies all ROS.    In the ED, pt was a stroke code but pt upon neurological examination, pt was AOx3 with no focal neurological deficits does have narrow, shuffling gate.    Vitals: 98.5, -->81 /85-->223/95-->149/74 RR18 SpO2 96%  labs: CBC wnl, FSG >600, troponin 63, Na131, BUN 30 Cr 1.38 glucose 716 BHB 0.5  UA: trace ketones, 8 WBC, numerous bacteria,   CTH: neg for acute process, brain mets  CTA H/N: mild stenosis fo the distal L M1 segment  EKG: NSR with PVCs, QTc 441  Intervention: CTX 1g, labetalol 20mg x2, nifedipine 30mg x1, Kcl 40mg x1  Consults: NSGY, neurology - stroke code (29 Jul 2024 22:58)    HPI (NSGY): 75 y/o F, former smoker, with hx of CKD, type 2 diabetes mellitus, Bell's palsy, HTN, RA, metastatic NSCLC, brain mets (dx in September 2023) who presented to [facility] ED after a witnessed mechanical fall while waiting for the elevator at [facility] to attend her radiology appointment for treatment for brain cancer. Patient suddenly fell. At that time (about 12:55PM), she was oriented to self only. She was unaware of falling while waiting for the elevator but remembers all the events leading up to the fall and afterwards when arriving to the ED. Of note, she lives alone and reports having multiple falls at home. She ambulates with a rolling walker. Stroke code called upon arrival to the ED. CTA head & neck revealed mild stenosis of the distal left M1 segment No TNK was given due to unknown LKW and active brain cancer. Thrombectomy not offered due to low suspicion of large vessel occlusion. Upon examination by neurosurgery, pt was alert and oriented x3. Denied LOC, dizziness, lightheadedness, and blurry vision.      HEMATOLOGY/ONCOLOGY HISTORY: Above history confirmed. States that she tripped and lost her footing, causing her to fall forward. Denies preceding prodromal symptoms, denies LOC. Today, she reports feeling well and has no complaints. Denies dizziness, lightheadedness, vision change, fevers, sweats, chills, SOB, dyspnea, chest pain, nausea/vomiting, abdominal pain.     REVIEW OF SYSTEMS:  All other review of systems is negative unless indicated above.    OBJECTIVE:  T(C): 37 (07-30-24 @ 05:21), Max: 37 (07-29-24 @ 18:15)  HR: 71 (07-30-24 @ 05:21) (59 - 108)  BP: 126/78 (07-30-24 @ 05:21) (126/78 - 230/100)  RR: 18 (07-30-24 @ 05:21) (17 - 18)  SpO2: 96% (07-30-24 @ 05:21) (95% - 99%)  Daily Height in cm: 160.02 (29 Jul 2024 13:19)    Daily     Physical Exam:  General: in no acute distress, non-toxic appearing  Eyes: EOMI intact bilaterally. Anicteric sclerae, moist conjunctivae  HENT: Moist mucous membranes  Neck: Trachea midline, supple  Lungs: Normal respiratory effort. No accessory muscle use  MSK: 2+ RLE pitting edema to mid shin, no calf tenderness  Neurological: AOx3, 5/5 strength in UE/LE. Cranial nerves grossly intact. Cerebellar testing intact  Skin: Warm and dry. No obvious rash     Medications:  MEDICATIONS  (STANDING):  atorvastatin 40 milliGRAM(s) Oral at bedtime  dextrose 5%. 1000 milliLiter(s) (100 mL/Hr) IV Continuous <Continuous>  dextrose 5%. 1000 milliLiter(s) (50 mL/Hr) IV Continuous <Continuous>  dextrose 50% Injectable 12.5 Gram(s) IV Push once  dextrose 50% Injectable 25 Gram(s) IV Push once  dextrose 50% Injectable 25 Gram(s) IV Push once  enoxaparin Injectable 40 milliGRAM(s) SubCutaneous every 24 hours  glucagon  Injectable 1 milliGRAM(s) IntraMuscular once  insulin lispro (ADMELOG) corrective regimen sliding scale   SubCutaneous Before meals and at bedtime  insulin lispro Injectable (ADMELOG) 9 Unit(s) SubCutaneous three times a day before meals  metoprolol succinate  milliGRAM(s) Oral daily  NIFEdipine XL 60 milliGRAM(s) Oral every 24 hours  nystatin Cream 1 Application(s) Topical every 12 hours    MEDICATIONS  (PRN):  dextrose Oral Gel 15 Gram(s) Oral once PRN Blood Glucose LESS THAN 70 milliGRAM(s)/deciliter      Labs:                        12.4   7.10  )-----------( 179      ( 30 Jul 2024 05:30 )             38.7     07-30    140  |  104  |  x   ----------------------------<  220<H>  4.1   |  26  |  1.10    Ca    9.0      30 Jul 2024 05:30    TPro  6.7  /  Alb  3.5  /  TBili  0.3  /  DBili  x   /  AST  19  /  ALT  16  /  AlkPhos  213<H>  07-29    PT/INR - ( 29 Jul 2024 13:51 )   PT: 11.0 sec;   INR: 0.96      PTT - ( 29 Jul 2024 13:51 )  PTT:25.4 sec    Urinalysis Basic - ( 30 Jul 2024 05:30 )    Color: x / Appearance: x / SG: x / pH: x  Gluc: 220 mg/dL / Ketone: x  / Bili: x / Urobili: x   Blood: x / Protein: x / Nitrite: x   Leuk Esterase: x / RBC: x / WBC x   Sq Epi: x / Non Sq Epi: x / Bacteria: x    Radiology: Reviewed    Onc Hx:  Ex-smoker with history of CKD, baseline creatinine is around 2, diabetes, rheumatoid arthritis, HTN.  She was experiencing 1 week of daily falls with lower extremity weakness without dizziness, was admitted to [facility].  MRI brain showed 0.9 cm right frontal lobe mass with surrounding vasogenic edema.  CT chest abdomen pelvis revealed left upper lobe mass 2.6 x 4.1 cm in posterior aspect of left upper lobe abutting major fissure. The mass extends to adjacent left hilum and laterally to left lateral pleura. 1.5 cm left lobe of liver lesion. 1.2 cm pancreatic body cystic lesion.  7/14/2023 bronchoscopy-lingular biopsy poorly differentiated adenocarcinoma, positive TTF-1, TMB 17.3, PD-L1 negative.  Tier II: Variants of Potential Clinical Significance  STK11 p.([id number])  PIK3CA p.([id number])  TP53 p.([id number])  Tier III: Variants of Unknown Clinical Significance  ALK p.([id number])  8/2023: [place] [facility] [place] chemotherapy cycle, Taxol/pembrolizumab only due to carboplatin shortage. Taxol was given because her labs there showed a GFR<45, excluding her from being able to receive pemetrexed. She tolerated treatment reasonably well, without any neuropathy or cytopenias. She lost he hair after first chemotherapy cycle.  9/5/2023: [place] [facility] Left upper lobe perihilar hypermetabolic mass consistent with biopsy-proven malignancy. Mildly FDG avid right thyroid nodule, correlate with thyroid ultrasound.  9/7/2023: Re[place] cycle 1 pac[place] [facility] [place]  10/10/2023: MRIB: Since 7/11/2023, right posterior frontal enhancing lesion and vasogenic edema are completely resolved as a favorable response to therapy. No new enhancing lesion.  2/6/24: CT CAP: Since 7/112023, significantly decreased left upper lobe lesion. No suspicious lymphadenopathy. No new disease in the chest abdomen or pelvis.  2/23/24: MR HEAD: No evidence of abnormal enhancement to suggest residual/recurrent metastatic disease. Chronic lacunar infarctions. Small right temporal occipital chronic infarction. Tiny left cerebellar chronic infarction. Microvascular disease.  6/14/2024:   CT CAP: Interval increase in left upper lobe pulmonary lesion. Suggestion of increase in left hepatic lesion, likely metastatic. Suggestion of increased soft tissue density and enlargement of cervix, not well visualized on this exam. New fluid collection in the subcutaneous tissues of the left gluteal region may represent an evolving hematoma from patient's recent fall in May 2024.  MRIB: Peripherally enhancing lesions are identified as described above. These findings are likely compatible with metastasis (progression of patient's underlying disease process) given patient's history.  ?  Disease: NSCLC    Pathology: Adenocarcinoma     [facility] [place].Rosa Jeffrey  NeuroSx: Dr.D'Amico  [facility]: Dr. Wernicke     Current Treatment Status:  ·	9/7/23: Taxol/pembrolizumab C1 [place] [facility] (no carboplatin due to national shortage)  ·	10/26/23: C2 carboplatin/Taxol/pembrolizumab  ·	11/16/23 C3 carboplatin/taxol/pembro (held due to hyperglycemia, elevated lfts and hyponatremia)  ·	11/22/23 C3 carboplatin/taxol/pembro  ·	12/13/23 C4 held due to hyperglycemia.  ·	1/9/24: C4 carboplatin/Taxol/pembrolizumab  ·	3/12/24: C1 pembro  ·	4/2/24: C2 pembro  ·	4/23/24: C3 pembro No Show due to hyperglycemia. Pt was seen in ED  ·	5/1/24: C3 pembro  ·	6/6/24: C4 pembro (delayed due to fall and rehab placement post hospitalization)  ·	7/11/24: C1 gemcitabine (dose reduced to 1000 mg/m2). Patient is a 74y old  Female who presents with a chief complaint of AMS and fall (30 Jul 2024 07:31)    HPI:  Pt  is a 75 yo F w/ PMH of T2DM, NSCLC adenocarcinoma with mets to brain (Dx 2023), s/p XRT, on chemo, R hip replacement, RA, CKD IIIA, HTN and HLD presenting for witnessed mechanical fall while waiting for elevator at [address] [place] [address]. Pt does not recall the events leading up to her hospitalization. Per ED note, pt was altered initially and unable to give history, name, date. Pt does report having multiple falls in the past and but still cannot recall what happened. Pt denies all ROS.    In the ED, pt was a stroke code but pt upon neurological examination, pt was AOx3 with no focal neurological deficits does have narrow, shuffling gate.    Vitals: 98.5, -->81 /85-->223/95-->149/74 RR18 SpO2 96%  labs: CBC wnl, FSG >600, troponin 63, Na131, BUN 30 Cr 1.38 glucose 716 BHB 0.5  UA: trace ketones, 8 WBC, numerous bacteria,   CTH: neg for acute process, brain mets  CTA H/N: mild stenosis fo the distal L M1 segment  EKG: NSR with PVCs, QTc 441  Intervention: CTX 1g, labetalol 20mg x2, nifedipine 30mg x1, Kcl 40mg x1  Consults: NSGY, neurology - stroke code (29 Jul 2024 22:58)    HPI (NSGY): 73 y/o F, former smoker, with hx of CKD, type 2 diabetes mellitus, Bell's palsy, HTN, RA, metastatic NSCLC, brain mets (dx in September 2023) who presented to [facility] ED after a witnessed mechanical fall while waiting for the elevator at [facility] to attend her radiology appointment for treatment for brain cancer. Patient suddenly fell. At that time (about 12:55PM), she was oriented to self only. She was unaware of falling while waiting for the elevator but remembers all the events leading up to the fall and afterwards when arriving to the ED. Of note, she lives alone and reports having multiple falls at home. She ambulates with a rolling walker. Stroke code called upon arrival to the ED. CTA head & neck revealed mild stenosis of the distal left M1 segment No TNK was given due to unknown LKW and active brain cancer. Thrombectomy not offered due to low suspicion of large vessel occlusion. Upon examination by neurosurgery, pt was alert and oriented x3. Denied LOC, dizziness, lightheadedness, and blurry vision.      HEMATOLOGY/ONCOLOGY HISTORY: Above history confirmed. Was waiting for elevator for scheduled appointment with radiation oncology. States that she tripped and lost her footing, causing her to fall forward. Denies preceding prodromal symptoms, denies LOC. Today, she reports feeling well and has no complaints. Denies dizziness, lightheadedness, vision change, fevers, sweats, chills, SOB, dyspnea, chest pain, nausea/vomiting, abdominal pain.     REVIEW OF SYSTEMS:  All other review of systems is negative unless indicated above.    OBJECTIVE:  T(C): 37 (07-30-24 @ 05:21), Max: 37 (07-29-24 @ 18:15)  HR: 71 (07-30-24 @ 05:21) (59 - 108)  BP: 126/78 (07-30-24 @ 05:21) (126/78 - 230/100)  RR: 18 (07-30-24 @ 05:21) (17 - 18)  SpO2: 96% (07-30-24 @ 05:21) (95% - 99%)  Daily Height in cm: 160.02 (29 Jul 2024 13:19)    Daily     Physical Exam:  General: in no acute distress, non-toxic appearing  Eyes: PERRLA, EOMI intact bilaterally. Anicteric sclerae, moist conjunctivae  HENT: Moist mucous membranes  Neck: Trachea midline, supple  Lungs: Normal respiratory effort. No accessory muscle use  MSK: 2+ RLE pitting edema to mid shin, no calf tenderness  Neurological: AOx3, 5/5 strength in UE/LE. Cranial nerves grossly intact. Finger to nose, heel to shin intact. Minor L facial droop due to bells palsy  Skin: Warm and dry. No obvious rash     Medications:  MEDICATIONS  (STANDING):  atorvastatin 40 milliGRAM(s) Oral at bedtime  dextrose 5%. 1000 milliLiter(s) (100 mL/Hr) IV Continuous <Continuous>  dextrose 5%. 1000 milliLiter(s) (50 mL/Hr) IV Continuous <Continuous>  dextrose 50% Injectable 12.5 Gram(s) IV Push once  dextrose 50% Injectable 25 Gram(s) IV Push once  dextrose 50% Injectable 25 Gram(s) IV Push once  enoxaparin Injectable 40 milliGRAM(s) SubCutaneous every 24 hours  glucagon  Injectable 1 milliGRAM(s) IntraMuscular once  insulin lispro (ADMELOG) corrective regimen sliding scale   SubCutaneous Before meals and at bedtime  insulin lispro Injectable (ADMELOG) 9 Unit(s) SubCutaneous three times a day before meals  metoprolol succinate  milliGRAM(s) Oral daily  NIFEdipine XL 60 milliGRAM(s) Oral every 24 hours  nystatin Cream 1 Application(s) Topical every 12 hours    MEDICATIONS  (PRN):  dextrose Oral Gel 15 Gram(s) Oral once PRN Blood Glucose LESS THAN 70 milliGRAM(s)/deciliter      Labs:                        12.4   7.10  )-----------( 179      ( 30 Jul 2024 05:30 )             38.7     07-30    140  |  104  |  x   ----------------------------<  220<H>  4.1   |  26  |  1.10    Ca    9.0      30 Jul 2024 05:30    TPro  6.7  /  Alb  3.5  /  TBili  0.3  /  DBili  x   /  AST  19  /  ALT  16  /  AlkPhos  213<H>  07-29    PT/INR - ( 29 Jul 2024 13:51 )   PT: 11.0 sec;   INR: 0.96      PTT - ( 29 Jul 2024 13:51 )  PTT:25.4 sec    Urinalysis Basic - ( 30 Jul 2024 05:30 )    Color: x / Appearance: x / SG: x / pH: x  Gluc: 220 mg/dL / Ketone: x  / Bili: x / Urobili: x   Blood: x / Protein: x / Nitrite: x   Leuk Esterase: x / RBC: x / WBC x   Sq Epi: x / Non Sq Epi: x / Bacteria: x    Radiology: Reviewed    Onc Hx:  Ex-smoker with history of CKD, baseline creatinine is around 2, diabetes, rheumatoid arthritis, HTN.  She was experiencing 1 week of daily falls with lower extremity weakness without dizziness, was admitted to [facility].  MRI brain showed 0.9 cm right frontal lobe mass with surrounding vasogenic edema.  CT chest abdomen pelvis revealed left upper lobe mass 2.6 x 4.1 cm in posterior aspect of left upper lobe abutting major fissure. The mass extends to adjacent left hilum and laterally to left lateral pleura. 1.5 cm left lobe of liver lesion. 1.2 cm pancreatic body cystic lesion.  7/14/2023 bronchoscopy-lingular biopsy poorly differentiated adenocarcinoma, positive TTF-1, TMB 17.3, PD-L1 negative.  Tier II: Variants of Potential Clinical Significance  STK11 p.([id number])  PIK3CA p.([id number])  TP53 p.([id number])  Tier III: Variants of Unknown Clinical Significance  ALK p.([id number])  8/2023: She followed up at [facility] where she received her first chemotherapy cycle, Taxol/pembrolizumab only due to carboplatin shortage. Taxol was given because her labs there showed a GFR<45, excluding her from being able to receive pemetrexed. She tolerated treatment reasonably well, without any neuropathy or cytopenias. She lost he hair after first chemotherapy cycle.  9/5/2023: PET at [facility]: Left upper lobe perihilar hypermetabolic mass consistent with biopsy-proven malignancy. Mildly FDG avid right thyroid nodule, correlate with thyroid ultrasound.  9/7/2023: Received cycle 1 paclitaxel/pembrolizumab at [facility] (Cr 1.2 there)  10/10/2023: MRIB: Since 7/11/2023, right posterior frontal enhancing lesion and vasogenic edema are completely resolved as a favorable response to therapy. No new enhancing lesion.  2/6/24: CT CAP: Since 7/112023, significantly decreased left upper lobe lesion. No suspicious lymphadenopathy. No new disease in the chest abdomen or pelvis.  2/23/24: MR HEAD: No evidence of abnormal enhancement to suggest residual/recurrent metastatic disease. Chronic lacunar infarctions. Small right temporal occipital chronic infarction. Tiny left cerebellar chronic infarction. Microvascular disease.  6/14/2024:   CT CAP: Interval increase in left upper lobe pulmonary lesion. Suggestion of increase in left hepatic lesion, likely metastatic. Suggestion of increased soft tissue density and enlargement of cervix, not well visualized on this exam. New fluid collection in the subcutaneous tissues of the left gluteal region may represent an evolving hematoma from patient's recent fall in May 2024.  MRIB: Peripherally enhancing lesions are identified as described above. These findings are likely compatible with metastasis (progression of patient's underlying disease process) given patient's history.  ?  Disease: NSCLC    Pathology: Adenocarcinoma     [facility] Dr.Rosa Jeffrey  NeuroSx: Dr.D'Amico  [facility]: Dr. Wernicke     Current Treatment Status:  ·	9/7/23: Taxol/pembrolizumab C1 given at [facility] (no carboplatin due to national shortage)  ·	10/26/23: C2 carboplatin/Taxol/pembrolizumab  ·	11/16/23 C3 carboplatin/taxol/pembro (held due to hyperglycemia, elevated lfts and hyponatremia)  ·	11/22/23 C3 carboplatin/taxol/pembro  ·	12/13/23 C4 held due to hyperglycemia.  ·	1/9/24: C4 carboplatin/Taxol/pembrolizumab  ·	3/12/24: C1 pembro  ·	4/2/24: C2 pembro  ·	4/23/24: C3 pembro No Show due to hyperglycemia. Pt was seen in ED  ·	5/1/24: C3 pembro  ·	6/6/24: C4 pembro (delayed due to fall and rehab placement post hospitalization)  ·	7/11/24: C1 gemcitabine (dose reduced to 1000 mg/m2). Patient is a 74y old  Female who presents with a chief complaint of AMS and fall (30 Jul 2024 07:31)    HPI:  Pt  is a 75 yo F w/ PMH of T2DM, NSCLC adenocarcinoma with mets to brain (Dx 2023), s/p XRT, on chemo, R hip replacement, RA, CKD IIIA, HTN and HLD presenting for witnessed mechanical fall while waiting for elevator at [address] [place] [address]. Pt does not recall the events leading up to her hospitalization. Per ED note, pt was altered initially and unable to give history, name, date. Pt does report having multiple falls in the past and but still cannot recall what happened. Pt denies all ROS.    In the ED, pt was a stroke code but pt upon neurological examination, pt was AOx3 with no focal neurological deficits does have narrow, shuffling gate.    Vitals: 98.5, -->81 /85-->223/95-->149/74 RR18 SpO2 96%  labs: CBC wnl, FSG >600, troponin 63, Na131, BUN 30 Cr 1.38 glucose 716 BHB 0.5  UA: trace ketones, 8 WBC, numerous bacteria,   CTH: neg for acute process, brain mets  CTA H/N: mild stenosis fo the distal L M1 segment  EKG: NSR with PVCs, QTc 441  Intervention: CTX 1g, labetalol 20mg x2, nifedipine 30mg x1, Kcl 40mg x1  Consults: NSGY, neurology - stroke code (29 Jul 2024 22:58)    HPI (NSGY): 75 y/o F, former smoker, with hx of CKD, type 2 diabetes mellitus, Bell's palsy, HTN, RA, metastatic NSCLC, brain mets (dx in September 2023) who presented to [facility] ED after a witnessed mechanical fall while waiting for the elevator at [facility] to attend her radiology appointment for treatment for brain cancer. Patient suddenly fell. At that time (about 12:55PM), she was oriented to self only. She was unaware of falling while waiting for the elevator but remembers all the events leading up to the fall and afterwards when arriving to the ED. Of note, she lives alone and reports having multiple falls at home. She ambulates with a rolling walker. Stroke code called upon arrival to the ED. CTA head & neck revealed mild stenosis of the distal left M1 segment No TNK was given due to unknown LKW and active brain cancer. Thrombectomy not offered due to low suspicion of large vessel occlusion. Upon examination by neurosurgery, pt was alert and oriented x3. Denied LOC, dizziness, lightheadedness, and blurry vision.      HEMATOLOGY/ONCOLOGY HISTORY: Above history confirmed. AOx3 at time of interview. Was waiting for elevator for scheduled appointment with radiation oncology. States that she tripped and lost her footing, causing her to fall forward. Denies preceding prodromal symptoms, denies LOC. Per chart review, fall was witnessed by bystanders and patient was altered initially. Stroke code called upon arrival to ED. CTA head showing mild stenosis of the distal left M1 segment. Otherwise, unchanged from prior. Neurosurgery consulted in ED.    Today, she reports feeling well and has no complaints. Denies dizziness, lightheadedness, vision change, fevers, sweats, chills, SOB, dyspnea, chest pain, nausea/vomiting, abdominal pain.     REVIEW OF SYSTEMS:  All other review of systems is negative unless indicated above.    OBJECTIVE:  T(C): 37 (07-30-24 @ 05:21), Max: 37 (07-29-24 @ 18:15)  HR: 71 (07-30-24 @ 05:21) (59 - 108)  BP: 126/78 (07-30-24 @ 05:21) (126/78 - 230/100)  RR: 18 (07-30-24 @ 05:21) (17 - 18)  SpO2: 96% (07-30-24 @ 05:21) (95% - 99%)  Daily Height in cm: 160.02 (29 Jul 2024 13:19)    Daily     Physical Exam:  General: in no acute distress, non-toxic appearing  Eyes: PERRLA, EOMI intact bilaterally. Anicteric sclerae, moist conjunctivae  HENT: Moist mucous membranes  Neck: Trachea midline, supple  Lungs: Normal respiratory effort. No accessory muscle use  MSK: 2+ RLE pitting edema to mid shin, no calf tenderness  Neurological: AOx3, 5/5 strength in UE/LE. Cranial nerves grossly intact. Finger to nose, heel to shin intact. Minor L facial droop due to bells palsy  Skin: Warm and dry. No obvious rash     Medications:  MEDICATIONS  (STANDING):  atorvastatin 40 milliGRAM(s) Oral at bedtime  dextrose 5%. 1000 milliLiter(s) (100 mL/Hr) IV Continuous <Continuous>  dextrose 5%. 1000 milliLiter(s) (50 mL/Hr) IV Continuous <Continuous>  dextrose 50% Injectable 12.5 Gram(s) IV Push once  dextrose 50% Injectable 25 Gram(s) IV Push once  dextrose 50% Injectable 25 Gram(s) IV Push once  enoxaparin Injectable 40 milliGRAM(s) SubCutaneous every 24 hours  glucagon  Injectable 1 milliGRAM(s) IntraMuscular once  insulin lispro (ADMELOG) corrective regimen sliding scale   SubCutaneous Before meals and at bedtime  insulin lispro Injectable (ADMELOG) 9 Unit(s) SubCutaneous three times a day before meals  metoprolol succinate  milliGRAM(s) Oral daily  NIFEdipine XL 60 milliGRAM(s) Oral every 24 hours  nystatin Cream 1 Application(s) Topical every 12 hours    MEDICATIONS  (PRN):  dextrose Oral Gel 15 Gram(s) Oral once PRN Blood Glucose LESS THAN 70 milliGRAM(s)/deciliter      Labs:                        12.4   7.10  )-----------( 179      ( 30 Jul 2024 05:30 )             38.7     07-30    140  |  104  |  x   ----------------------------<  220<H>  4.1   |  26  |  1.10    Ca    9.0      30 Jul 2024 05:30    TPro  6.7  /  Alb  3.5  /  TBili  0.3  /  DBili  x   /  AST  19  /  ALT  16  /  AlkPhos  213<H>  07-29    PT/INR - ( 29 Jul 2024 13:51 )   PT: 11.0 sec;   INR: 0.96      PTT - ( 29 Jul 2024 13:51 )  PTT:25.4 sec    Urinalysis Basic - ( 30 Jul 2024 05:30 )    Color: x / Appearance: x / SG: x / pH: x  Gluc: 220 mg/dL / Ketone: x  / Bili: x / Urobili: x   Blood: x / Protein: x / Nitrite: x   Leuk Esterase: x / RBC: x / WBC x   Sq Epi: x / Non Sq Epi: x / Bacteria: x    Radiology: Reviewed    Onc Hx:  Ex-smoker with history of CKD, baseline creatinine is around 2, diabetes, rheumatoid arthritis, HTN.  She was experiencing 1 week of daily falls with lower extremity weakness without dizziness, was admitted to [facility].  MRI brain showed 0.9 cm right frontal lobe mass with surrounding vasogenic edema.  CT chest abdomen pelvis revealed left upper lobe mass 2.6 x 4.1 cm in posterior aspect of left upper lobe abutting major fissure. The mass extends to adjacent left hilum and laterally to left lateral pleura. 1.5 cm left lobe of liver lesion. 1.2 cm pancreatic body cystic lesion.  7/14/2023 bronchoscopy-lingular biopsy poorly differentiated adenocarcinoma, positive TTF-1, TMB 17.3, PD-L1 negative.  Tier II: Variants of Potential Clinical Significance  STK11 p.([id number])  PIK3CA p.([id number])  TP53 p.([id number])  Tier III: Variants of Unknown Clinical Significance  ALK p.([id number])  8/2023: She [place] up at [facility] where she received her first chemotherapy cycle, Taxol/pembrolizumab only due to carboplatin shortage. Taxol was given because her labs there showed a GFR<45, excluding her from being able to receive pemetrexed. She tolerated treatment reasonably well, without any neuropathy or cytopenias. She lost he hair after first chemotherapy cycle.  9/5/2023: PET at [facility]: Left upper lobe perihilar hypermetabolic mass consistent with biopsy-proven malignancy. Mildly FDG avid right thyroid nodule, correlate with thyroid ultrasound.  9/7/2023: Received cycle 1 paclitaxel/pembrolizumab at [facility] (Cr 1.2 there)  10/10/2023: MRIB: Since 7/11/2023, right posterior frontal enhancing lesion and vasogenic edema are completely resolved as a favorable response to therapy. No new enhancing lesion.  2/6/24: CT CAP: Since 7/112023, significantly decreased left upper lobe lesion. No suspicious lymphadenopathy. No new disease in the chest abdomen or pelvis.  2/23/24: MR HEAD: No evidence of abnormal enhancement to suggest residual/recurrent metastatic disease. Chronic lacunar infarctions. Small right temporal occipital chronic infarction. Tiny left cerebellar chronic infarction. Microvascular disease.  6/14/2024:   CT CAP: Interval increase in left upper lobe pulmonary lesion. Suggestion of increase in left hepatic lesion, likely metastatic. Suggestion of increased soft tissue density and enlargement of cervix, not well visualized on this exam. New fluid collection in the subcutaneous tissues of the left gluteal region may represent an evolving hematoma from patient's recent fall in May 2024.  MRIB: Peripherally enhancing lesions are identified as described above. These findings are likely compatible with metastasis (progression of patient's underlying disease process) given patient's history.  ?  Disease: NSCLC    Pathology: Adenocarcinoma     [facility] Dr.Rosa Jeffrey  [facility]: Dr.D'Amico  [facility]: Dr. Wernicke     Current Treatment Status:  ·	9/7/23: Taxol/pembrolizumab C1 given at [facility] (no carboplatin due to national shortage)  ·	10/26/23: C2 carboplatin/Taxol/pembrolizumab  ·	11/16/23 C3 carboplatin/taxol/pembro (held due to hyperglycemia, elevated lfts and hyponatremia)  ·	11/22/23 C3 carboplatin/taxol/pembro  ·	12/13/23 C4 held due to hyperglycemia.  ·	1/9/24: C4 carboplatin/Taxol/pembrolizumab  ·	3/12/24: C1 pembro  ·	4/2/24: C2 pembro  ·	4/23/24: C3 pembro No Show due to hyperglycemia. Pt was seen in ED  ·	5/1/24: C3 pembro  ·	6/6/24: C4 pembro (delayed due to fall and rehab placement post hospitalization)  ·	7/11/24: C1 gemcitabine (dose reduced to 1000 mg/m2).

## 2024-07-30 NOTE — DIETITIAN INITIAL EVALUATION ADULT - PROBLEM SELECTOR PLAN 6
Baseline Cr ~1.2. On admission Cr 1.38-->1.2 s/p 2L NS. likely iso hypertensive emergency.  - f/u daily BMP  RESOLVED

## 2024-07-30 NOTE — PROGRESS NOTE ADULT - PROBLEM SELECTOR PLAN 1
Pt p/w mechanical fall and AOx0 on admission. Stroke code called in the ED. CTH without acute pathology of stroke but notable for brain mets. Neurological exam significant for shuffling and narrow gait. Reportedly had past mechanical falls but no LOC, UA with 8 WBC and many bacteria but asymptomatic per patient. s/p CTX 1g. Etiology of encephalopathy possibly 2/2 brain mets vs infection (low suspicion) vs hypertensive emergency. Pt is AOx3 now.  TTE 2023:  Normal left ventricular size and systolic function. Moderate symmetric left ventricular hypertrophy  - f/u UCx  - f/u TTE  - f/u daily BMP  - PT/OT eval Pt p/w mechanical fall and AOx0 on admission. Stroke code called in the ED. CTH without acute pathology of stroke but notable for brain mets. Neurological exam significant for shuffling and narrow gait. Reportedly had past mechanical falls but no LOC, UA with 8 WBC and many bacteria but asymptomatic per patient. s/p CTX 1g. Etiology of encephalopathy possibly 2/2 brain mets vs infection (low suspicion) vs hypertensive emergency. Pt is AOx3 now.  TTE 2023:  Normal left ventricular size and systolic function. Moderate symmetric left ventricular hypertrophy    Plan:    - Video EEG ordered  - Consult neurology for parkinson's workup  - Hgb A1c ordered for tomorrow  - D/c TTE order  - f/u daily BMP  - f/u UCx  - F/u PT/OT eval Pt p/w witnessed mechanical fall but no LOC or head strike, AOx0 on admission, BP P 131/85-->223/95-->149/74, FSG >600. Stroke code called in the ED, NIHSS 0, CTH without acute pathology of stroke but notable for brain mets. No leukocytosis, UA with 8 WBC and many bacteria but pt asymptomatic. s/p CTX 1g. On later evaluation, AOx3, pt recalls her fall and claims that she tripped. Neurological exam significant for shuffling and narrow gait, cogwheeling, slow speech, and resting tremors in bilateral hands and chin.   Per chart review, TTE in 2023 noted normal LV size and systolic function, moderate LVH. MRI in June 2024 significant for 2 new brain metastases.   Of note, pt's compliance with home BP meds and insulin is unclear. Currently unknown where or how patient has been getting her medications (spoke with daughter and called multiple pharmacies, but none have recently dispensed her any medications).   Ddx of fall/encephalopathy: hypertensive emergency, hyperglycemia, brain metastases, seizure, untreated Parkinson's. Lower suspicion for infection or cardiac syncope.     Plan:  - f/u vEEG  - Consider neurology consult in AM  - Daily BMP  - f/u UCx  - PT/OT to evaluate after BLE Duplex  - palliative consulted, appreciate assistance - pt full care/full code

## 2024-07-30 NOTE — DISCHARGE NOTE PROVIDER - NSDCFUADDAPPT_GEN_ALL_CORE_FT
Please follow up with Dr. MARIEE, your primary care doctor  August 5th 4:20PM  [address]  [phone]    Please follow up with ENDOCRINOLOGY:  August 26th 9AM  [address]  [phone]    Please follow up with HEMATOLOGY ONCOLOGY:   Please follow up with Dr. MARIEE, your primary care doctor  August 5th 4:20PM  [address]  [phone]    Please follow up with DR. ORTEGA, HEMATOLOGY ONCOLOGY:  August 7th 10AM  [address]  [phone]    Please follow up with ENDOCRINOLOGY:  August 26th 9AM  [address]   [phone]

## 2024-07-30 NOTE — CONSULT NOTE ADULT - PROBLEM SELECTOR RECOMMENDATION 2
Diagnosed 2023. Underwent RT and actively on chemo.  - Oncologist: Sedrick Delaney  - Asymptomatic  - Plan to continue treatment on discharge

## 2024-07-30 NOTE — PROGRESS NOTE ADULT - PROBLEM SELECTOR PLAN 6
Baseline Cr ~1.2. On admission Cr 1.38-->1.2 s/p 2L NS. likely iso hypertensive emergency.  - f/u daily BMP  RESOLVED Pt with L sided pitting edema. No edema on R, No pain, 2+ pulses. Per patient, had edema in the past, not sure if unilateral    Plan:    - F/u b/l LE doppler Resolved.  Baseline Cr ~1.2. On admission Cr 1.38-->1.2 s/p 2L NS. likely iso hypertensive emergency.

## 2024-07-30 NOTE — DISCHARGE NOTE PROVIDER - NSDCCPCAREPLAN_GEN_ALL_CORE_FT
PRINCIPAL DISCHARGE DIAGNOSIS  Diagnosis: AMS (altered mental status)  Assessment and Plan of Treatment: When you were brought into the emergency department, we noticed you were altered. You were found to have very high glucose levels and very high blood pressure.   ----  - Please continue taking the medications as directed for your diabetes and high blood pressure.  - Please follow up with your Primary Care Physician within 1-2 weeks of discharge from the hospital.        SECONDARY DISCHARGE DIAGNOSES  Diagnosis: Falls  Assessment and Plan of Treatment: You have had a fall. It appears that the cause is “mechanical”. That means that you slipped, tripped or lost your balance. If your fall had been due to fainting or a seizure, further tests would be required. Health conditions which change your blood pressure, vision, muscle strength and coordination may increase your risk for falls. Medication can also increase your risk if they make you dizzy, weak, or sleepy. To prevent falls, you can stand or sit up slowly, use assistive devices as directed, pwear shoes that fit well and have soles that , wear a personal alarm, stay active, and manage your medical conditions. Home safety tips include keeping your path clear, removing small rugs, not walking on wet surfaces, installing bright lights at home, and keeping items you use often on shelves within reach.  While you were admitted, we obtained a CT scan of your head which did not show any stroke. You were also seen by our general neurologist while admitted.  ----  - Please follow up with neurology outpatient. ****    Diagnosis: Hypertension  Assessment and Plan of Treatment: Hypertension is the medical term for high blood pressure. Blood pressure refers to the pressure that blood applies to the inner walls of the arteries. Arteries carry blood from the heart to other organs and parts of the body. Untreated high blood pressure increases the strain on the heart and arteries, eventually causing organ damage. High blood pressure increases the risk of heart failure, heart attack (myocardial infarction), stroke, and kidney failure. High blood pressure does not usually cause any symptoms. Treatment of hypertension usually begins with lifestyle changes. Making these lifestyle changes involves little or no risk. Recommended changes often include reducing the amount of salt in your diet, losing weight if you are overweight or obese, avoiding drinking too much alcohol, stopping smoking and exercising at least 30 minutes per day most days of the week. If you are prescribed medication for your hypertension it is important to take these as prescribed to prevent the possible complications of uncontrolled hypertension.  ----  - Please continue taking **** and ***** as prescribed.  - Please follow up with your Primary Care Physician within 1-2 weeks of discharge from the hospital.    Diagnosis: AMS (altered mental status)  Assessment and Plan of Treatment: When you were brought into the emergency department, we noticed you were altered. You were found to have very high glucose levels and very high blood pressure.   ----  - Please continue taking the medications as directed for your diabetes and high blood pressure.  - Please follow up with your Primary Care Physician within 1-2 weeks of discharge from the hospital.      Diagnosis: Acute hyperglycemia  Assessment and Plan of Treatment: You have a diagnosis of type 2 diabetes (sometimes called type 2 "diabetes mellitus"). This is a disorder that disrupts the way your body uses sugar. All the cells in your body need sugar to work normally. Sugar gets into the cells with the help of a hormone called insulin. If there is not enough insulin, or if the body stops responding to insulin, sugar builds up in the blood. That is what happens to people with diabetes. Type 2 diabetes usually causes no symptoms. When symptoms do occur, they include the need to urinate often, intense thirst and blurry vision. Even though type 2 diabetes might not make you feel sick, it can cause serious problems over time, if it is not treated. The disorder can lead to heart attacks, strokes, kidney disease, vision problems (or even blindness), pain or loss of feeling in the hands and feet, and the need to have fingers, toes, or other body parts removed (amputated). In addition to maintaining an active lifestyle, losing weight, eating right, and not smoking it is important to take your diabetes medications as directed to control your blood sugar and prevent the possible complications from this disease.  When you were in the emergency room, your blood sugar was very high (>700). You were treated with insulin and your blood sugars improved.  -----  - Please continue taking your insulin as directed.  - Please follow up with your Primary Care Physician within 1-2 weeks of discharge from the hospital.       PRINCIPAL DISCHARGE DIAGNOSIS  Diagnosis: Falls  Assessment and Plan of Treatment: You have had a fall. It appears that the cause is “mechanical”. That means that you slipped, tripped or lost your balance. If your fall had been due to fainting or a seizure, further tests would be required. Health conditions which change your blood pressure, vision, muscle strength and coordination may increase your risk for falls. Medication can also increase your risk if they make you dizzy, weak, or sleepy. To prevent falls, you can stand or sit up slowly, use assistive devices as directed, pwear shoes that fit well and have soles that , wear a personal alarm, stay active, and manage your medical conditions. Home safety tips include keeping your path clear, removing small rugs, not walking on wet surfaces, installing bright lights at home, and keeping items you use often on shelves within reach.  While you were admitted, we obtained a CT scan of your head which did not show any stroke. You were also seen by our general neurologist while admitted.  ----  - Please follow up with your Dr. Douglass, your primary care physician, within 1-2 weeks of discharge from the hospital.      SECONDARY DISCHARGE DIAGNOSES  Diagnosis: AMS (altered mental status)  Assessment and Plan of Treatment: When you were brought into the emergency department, we noticed you were altered. You were found to have very high glucose levels and very high blood pressure.   ----  - Please continue taking the medications as directed for your diabetes and high blood pressure.  - Please follow up with Dr. Douglass, your primary care physician, within 1-2 weeks of discharge from the hospital.      Diagnosis: Hypertension  Assessment and Plan of Treatment: Hypertension is the medical term for high blood pressure. Blood pressure refers to the pressure that blood applies to the inner walls of the arteries. Arteries carry blood from the heart to other organs and parts of the body. Untreated high blood pressure increases the strain on the heart and arteries, eventually causing organ damage. High blood pressure increases the risk of heart failure, heart attack (myocardial infarction), stroke, and kidney failure. High blood pressure does not usually cause any symptoms. Treatment of hypertension usually begins with lifestyle changes. Making these lifestyle changes involves little or no risk. Recommended changes often include reducing the amount of salt in your diet, losing weight if you are overweight or obese, avoiding drinking too much alcohol, stopping smoking and exercising at least 30 minutes per day most days of the week. If you are prescribed medication for your hypertension it is important to take these as prescribed to prevent the possible complications of uncontrolled hypertension.  We found that your blood pressure was very high in the emergency room. We started giving you your home medications and that helped control your blood pressure.  ----  - Please continue taking METOPROLOL SUCCINATE and NIFEDIPINE EXTENDED RELEASE as prescribed.  - Please follow up with Dr. Douglass, your primary care physician, within 1-2 weeks of discharge from the hospital.    Diagnosis: Acute hyperglycemia  Assessment and Plan of Treatment: You have a diagnosis of type 2 diabetes (sometimes called type 2 "diabetes mellitus"). This is a disorder that disrupts the way your body uses sugar. All the cells in your body need sugar to work normally. Sugar gets into the cells with the help of a hormone called insulin. If there is not enough insulin, or if the body stops responding to insulin, sugar builds up in the blood. That is what happens to people with diabetes. Type 2 diabetes usually causes no symptoms. When symptoms do occur, they include the need to urinate often, intense thirst and blurry vision. Even though type 2 diabetes might not make you feel sick, it can cause serious problems over time, if it is not treated. The disorder can lead to heart attacks, strokes, kidney disease, vision problems (or even blindness), pain or loss of feeling in the hands and feet, and the need to have fingers, toes, or other body parts removed (amputated). In addition to maintaining an active lifestyle, losing weight, eating right, and not smoking it is important to take your diabetes medications as directed to control your blood sugar and prevent the possible complications from this disease.  When you were in the emergency room, your blood sugar was very high (>700). You were treated with insulin and your blood sugars improved.  -----  - Please continue taking your INSULIN as directed.  - Please follow up with the endocrinologist for your diabetes within 1-2 weeks of discharge.    Diagnosis: Falls  Assessment and Plan of Treatment: You have had a fall. It appears that the cause is “mechanical”. That means that you slipped, tripped or lost your balance. If your fall had been due to fainting or a seizure, further tests would be required. Health conditions which change your blood pressure, vision, muscle strength and coordination may increase your risk for falls. Medication can also increase your risk if they make you dizzy, weak, or sleepy. To prevent falls, you can stand or sit up slowly, use assistive devices as directed, pwear shoes that fit well and have soles that , wear a personal alarm, stay active, and manage your medical conditions. Home safety tips include keeping your path clear, removing small rugs, not walking on wet surfaces, installing bright lights at home, and keeping items you use often on shelves within reach.  While you were admitted, we obtained a CT scan of your head which did not show any stroke. You were also seen by our general neurologist while admitted.  ----  - Please follow up with Dr. Douglass, your primary care physician, within 1-2 weeks of discharge from the hospital.    Diagnosis: NSCLC metastatic to brain  Assessment and Plan of Treatment: You received one session of radiation therapy while in the hospital.   ---  - Please follow up with Dr. Castano, your oncologist, within 1-2 weeks of discharge from the hospital.     PRINCIPAL DISCHARGE DIAGNOSIS  Diagnosis: Falls  Assessment and Plan of Treatment: You have had a fall. It appears that the cause is “mechanical”. That means that you slipped, tripped or lost your balance. If your fall had been due to fainting or a seizure, further tests would be required. Health conditions which change your blood pressure, vision, muscle strength and coordination may increase your risk for falls. Medication can also increase your risk if they make you dizzy, weak, or sleepy. To prevent falls, you can stand or sit up slowly, use assistive devices as directed, pwear shoes that fit well and have soles that , wear a personal alarm, stay active, and manage your medical conditions. Home safety tips include keeping your path clear, removing small rugs, not walking on wet surfaces, installing bright lights at home, and keeping items you use often on shelves within reach.  While you were admitted, we obtained a CT scan of your head which did not show any stroke. You were also seen by our general neurologist while admitted.  ----  - Please follow up with your Dr. Douglass, your primary care physician, within 1-2 weeks of discharge from the hospital.      SECONDARY DISCHARGE DIAGNOSES  Diagnosis: DVT, lower extremity  Assessment and Plan of Treatment: A deep vein thrombosis (DVT) is a blood clot in certain veins, usually in the legs, pelvis, or arms. Blood clots in these veins need to be treated because they can get bigger, break loose, and travel through the bloodstream to the lungs. A blood clot in a lung can be life-threatening.  The doctor may have given you a blood thinner (anticoagulant). A blood thinner can stop the blood clot from growing larger and prevent new clots from forming. You will need to take a blood thinner for at least 3 months.  ---    • Start ELIQUIS. Start taking 2 tablets twice a day for 7 days (first dose August 2nd), then decrease to 1 tablet once a day for 21 days.    Diagnosis: Falls  Assessment and Plan of Treatment: You have had a fall. It appears that the cause is “mechanical”. That means that you slipped, tripped or lost your balance. If your fall had been due to fainting or a seizure, further tests would be required. Health conditions which change your blood pressure, vision, muscle strength and coordination may increase your risk for falls. Medication can also increase your risk if they make you dizzy, weak, or sleepy. To prevent falls, you can stand or sit up slowly, use assistive devices as directed, pwear shoes that fit well and have soles that , wear a personal alarm, stay active, and manage your medical conditions. Home safety tips include keeping your path clear, removing small rugs, not walking on wet surfaces, installing bright lights at home, and keeping items you use often on shelves within reach.  While you were admitted, we obtained a CT scan of your head which did not show any stroke. You were also seen by our general neurologist while admitted.  ----  - Please follow up with Dr. Douglass, your primary care physician, within 1-2 weeks of discharge from the hospital.    Diagnosis: AMS (altered mental status)  Assessment and Plan of Treatment: When you were brought into the emergency department, we noticed you were altered. You were found to have very high glucose levels and very high blood pressure.   ----  - Please continue taking the medications as directed for your diabetes and high blood pressure.  - Please follow up with Dr. Douglass, your primary care physician, within 1-2 weeks of discharge from the hospital.      Diagnosis: Hypertension  Assessment and Plan of Treatment: Hypertension is the medical term for high blood pressure. Blood pressure refers to the pressure that blood applies to the inner walls of the arteries. Arteries carry blood from the heart to other organs and parts of the body. Untreated high blood pressure increases the strain on the heart and arteries, eventually causing organ damage. High blood pressure increases the risk of heart failure, heart attack (myocardial infarction), stroke, and kidney failure. High blood pressure does not usually cause any symptoms. Treatment of hypertension usually begins with lifestyle changes. Making these lifestyle changes involves little or no risk. Recommended changes often include reducing the amount of salt in your diet, losing weight if you are overweight or obese, avoiding drinking too much alcohol, stopping smoking and exercising at least 30 minutes per day most days of the week. If you are prescribed medication for your hypertension it is important to take these as prescribed to prevent the possible complications of uncontrolled hypertension.  We found that your blood pressure was very high in the emergency room. We started giving you your home medications and that helped control your blood pressure.  ----  - Please continue taking METOPROLOL SUCCINATE and NIFEDIPINE EXTENDED RELEASE as prescribed.  - Please follow up with Dr. Douglass, your primary care physician, within 1-2 weeks of discharge from the hospital.    Diagnosis: Acute hyperglycemia  Assessment and Plan of Treatment: You have a diagnosis of type 2 diabetes (sometimes called type 2 "diabetes mellitus"). This is a disorder that disrupts the way your body uses sugar. All the cells in your body need sugar to work normally. Sugar gets into the cells with the help of a hormone called insulin. If there is not enough insulin, or if the body stops responding to insulin, sugar builds up in the blood. That is what happens to people with diabetes. Type 2 diabetes usually causes no symptoms. When symptoms do occur, they include the need to urinate often, intense thirst and blurry vision. Even though type 2 diabetes might not make you feel sick, it can cause serious problems over time, if it is not treated. The disorder can lead to heart attacks, strokes, kidney disease, vision problems (or even blindness), pain or loss of feeling in the hands and feet, and the need to have fingers, toes, or other body parts removed (amputated). In addition to maintaining an active lifestyle, losing weight, eating right, and not smoking it is important to take your diabetes medications as directed to control your blood sugar and prevent the possible complications from this disease.  When you were in the emergency room, your blood sugar was very high (>700). You were treated with insulin and your blood sugars improved.  -----  - Please continue taking your INSULIN as directed.  - Please follow up with the endocrinologist for your diabetes within 1-2 weeks of discharge.    Diagnosis: NSCLC metastatic to brain  Assessment and Plan of Treatment: You received one session of radiation therapy while in the hospital.   ---  - Please follow up with Dr. aCstano, your oncologist, within 1-2 weeks of discharge from the hospital.

## 2024-07-30 NOTE — DISCHARGE NOTE PROVIDER - NSDCMRMEDTOKEN_GEN_ALL_CORE_FT
amLODIPine 10 mg oral tablet: 1 tab(s) orally once a day  calcium carbonate 500 mg (200 mg elemental calcium) oral tablet, chewable: 1 tab(s) orally 2 times a day  Cozaar 100 mg oral tablet: orally once a day  ferrous sulfate 324 mg (65 mg elemental iron) oral delayed release tablet: 1 tab(s) orally once a day  folic acid 1 mg oral tablet: 1 tab(s) orally once a day  hydroxychloroquine 200 mg oral tablet: 1 tab(s) orally 2 times a day  insulin aspart 100 units/mL injectable solution: 5 unit(s) injectable 3 times a day (before meals)  Januvia 100 mg oral tablet: 1 tab(s) orally once a day  Lantus 100 units/mL subcutaneous solution: 17 unit(s) subcutaneous once a day (at bedtime)  Lipitor 40 mg oral tablet: 1 tab(s) orally once a day  Multiple Vitamins oral capsule: 1 cap(s) orally once a day  pioglitazone 15 mg oral tablet: 1 tab(s) orally once a day  predniSONE 5 mg oral tablet: 1 tab(s) orally once a day Please take through 11/1 (until your appointment with Dr. Douglass)  sulfaSALAzine 500 mg oral tablet: 2 tab(s) orally 2 times a day  Vitamin B12 1000 mcg oral tablet: 1 tab(s) orally once a day  Vitamin C 500 mg oral tablet: 1 tab(s) orally once a day  Vitamin D3 25 mcg (1000 intl units) oral tablet: 1 tab(s) orally once a day   amLODIPine 10 mg oral tablet: 1 tab(s) orally once a day  calcium carbonate 500 mg (200 mg elemental calcium) oral tablet, chewable: 1 tab(s) orally 2 times a day  Cozaar 100 mg oral tablet: orally once a day  ferrous sulfate 324 mg (65 mg elemental iron) oral delayed release tablet: 1 tab(s) orally once a day  folic acid 1 mg oral tablet: 1 tab(s) orally once a day  hydroxychloroquine 200 mg oral tablet: 1 tab(s) orally 2 times a day  insulin aspart 100 units/mL injectable solution: 5 unit(s) injectable 3 times a day (before meals)  insulin glargine 100 units/mL subcutaneous solution: 25 unit(s) subcutaneous once a day (at bedtime)  Januvia 100 mg oral tablet: 1 tab(s) orally once a day  Lantus 100 units/mL subcutaneous solution: 17 unit(s) subcutaneous once a day (at bedtime)  Lipitor 40 mg oral tablet: 1 tab(s) orally once a day  Multiple Vitamins oral capsule: 1 cap(s) orally once a day  NIFEdipine 60 mg oral tablet, extended release: 1 tab(s) orally every 24 hours  pioglitazone 15 mg oral tablet: 1 tab(s) orally once a day  predniSONE 5 mg oral tablet: 1 tab(s) orally once a day Please take through 11/1 (until your appointment with Dr. Douglass)  sulfaSALAzine 500 mg oral tablet: 2 tab(s) orally 2 times a day  Vitamin B12 1000 mcg oral tablet: 1 tab(s) orally once a day  Vitamin C 500 mg oral tablet: 1 tab(s) orally once a day  Vitamin D3 25 mcg (1000 intl units) oral tablet: 1 tab(s) orally once a day   Admelog 100 units/mL injectable solution: 18 unit(s) injectable 3 times a day (before meals)  alcohol swabs: Apply topically to affected area 4 times a day  amLODIPine 10 mg oral tablet: 1 tab(s) orally once a day  calcium carbonate 500 mg (200 mg elemental calcium) oral tablet, chewable: 1 tab(s) orally 2 times a day  Cozaar 100 mg oral tablet: orally once a day  ferrous sulfate 324 mg (65 mg elemental iron) oral delayed release tablet: 1 tab(s) orally once a day  folic acid 1 mg oral tablet: 1 tab(s) orally once a day  hydroxychloroquine 200 mg oral tablet: 1 tab(s) orally 2 times a day  insulin aspart 100 units/mL injectable solution: 5 unit(s) injectable 3 times a day (before meals)  insulin glargine 100 units/mL subcutaneous solution: 25 unit(s) subcutaneous once a day (at bedtime)  Insulin Pen Needles, 4mm: 1 application subcutaneously 4 times a day. ** Use with insulin pen **  Januvia 100 mg oral tablet: 1 tab(s) orally once a day  lancets: 1 application subcutaneously 4 times a day  Lantus 100 units/mL subcutaneous solution: 17 unit(s) subcutaneous once a day (at bedtime)  Lantus 100 units/mL subcutaneous solution: 32 unit(s) subcutaneous once a day (at bedtime)  Lipitor 40 mg oral tablet: 1 tab(s) orally once a day  Multiple Vitamins oral capsule: 1 cap(s) orally once a day  NIFEdipine 60 mg oral tablet, extended release: 1 tab(s) orally every 24 hours  pioglitazone 15 mg oral tablet: 1 tab(s) orally once a day  predniSONE 5 mg oral tablet: 1 tab(s) orally once a day Please take through 11/1 (until your appointment with Dr. Douglass)  sulfaSALAzine 500 mg oral tablet: 2 tab(s) orally 2 times a day  test strips (per patient&#x27;s insurance): 1 application subcutaneously 4 times a day. ** Compatible with patient&#x27;s glucometer **  Vitamin B12 1000 mcg oral tablet: 1 tab(s) orally once a day  Vitamin C 500 mg oral tablet: 1 tab(s) orally once a day  Vitamin D3 25 mcg (1000 intl units) oral tablet: 1 tab(s) orally once a day   Admelog 100 units/mL injectable solution: 18 unit(s) injectable 3 times a day (before meals)  alcohol swabs: Apply topically to affected area 4 times a day  atorvastatin 40 mg oral tablet: 1 tab(s) orally once a day (at bedtime)  cefpodoxime 100 mg oral tablet: 1 tab(s) orally every 12 hours  Eliquis Starter Pack for Treatment of DVT and PE 5 mg oral tablet: 1 kit orally every 12 hours Take 2 tablets every 12 hours for 7 days until August 8th. Starting August 9th, take 1 tablet every 12 hours for 21 days.  Insulin Pen Needles, 4mm: 1 application subcutaneously 4 times a day. ** Use with insulin pen **  lancets: 1 application subcutaneously 4 times a day  Lantus 100 units/mL subcutaneous solution: 32 unit(s) subcutaneous once a day (at bedtime)  NIFEdipine 60 mg oral tablet, extended release: 1 tab(s) orally every 24 hours  test strips (per patient&#x27;s insurance): 1 application subcutaneously 4 times a day. ** Compatible with patient&#x27;s glucometer **   Admelog 100 units/mL injectable solution: 18 unit(s) injectable 3 times a day (before meals)  atorvastatin 40 mg oral tablet: 1 tab(s) orally once a day (at bedtime)  cefpodoxime 100 mg oral tablet: 1 tab(s) orally every 12 hours  Eliquis Starter Pack for Treatment of DVT and PE 5 mg oral tablet: 1 kit orally every 12 hours Take 2 tablets every 12 hours for 7 days until August 8th. Starting August 9th, take 1 tablet every 12 hours for 21 days.  Lantus 100 units/mL subcutaneous solution: 32 unit(s) subcutaneous once a day (at bedtime)  NIFEdipine 60 mg oral tablet, extended release: 1 tab(s) orally every 24 hours

## 2024-07-30 NOTE — PROGRESS NOTE ADULT - PROBLEM SELECTOR PLAN 4
Per outpatient Endo note 7/3/24 - A1C goal <8.5%. A1C - 9% (7/3/24). Current regimen: Lantus 18 units daily, Novolog 8 units with meals. outpatient Endocrinologist Dr. Vilma gomez/w home regimen - 18U lantus and 8U lispro  - endocrinology consult in the AM Per outpatient Endo note 7/3/24 - A1C goal <8.5%. A1C - 9% (7/3/24). Current regimen: Lantus 18 units daily, Novolog 8 units with meals. outpatient Endocrinologist Dr. Esparza    Plan:    - c/w home regimen - 18U lantus and 8U lispro  - Consult endocrinology to follow Follows with Dr. Delaney at [facility]. Pt is receiving both chemo and radiation therapy. Last session for chemo 06/2024.    Plan:    - Consult rad/onc in AM  - f/u heme/onc recs  - Zofran PRN for nausea

## 2024-07-30 NOTE — DIETITIAN INITIAL EVALUATION ADULT - OTHER CALCULATIONS
Estimated needs based on dosing wt as within % IBW 115lb/52.3kg (117%). Needs adjusted for age, cancer on treatment, and clinical status.

## 2024-07-30 NOTE — PROGRESS NOTE ADULT - PROBLEM SELECTOR PLAN 7
Pt with L sided pitting edema. No edema on R, No pain, 2+ pulses. Per patient, had edema in the past, not sure if unilateral  - f/u b/l LE doppler Baseline Cr ~1.2. On admission Cr 1.38-->1.2 s/p 2L NS. likely iso hypertensive emergency.    Plan:  - DUNIA has resolved  - f/u daily BMP Resolved.  Pt endorses compliance with insulin regimen but in ED blood glucose >700, BHB 0.5. Per chart review, pt has had ED visits in the past with hyperglycemia. Low concern for DKA as ABG w/o acidosis or anion gap. s/p 15U insulin in the ED and FSG improved to 289. Pt is asymptomatic.     - plan as above

## 2024-07-30 NOTE — PROGRESS NOTE ADULT - PROBLEM SELECTOR PLAN 5
Follows with Dr. Delaney at [facility]. Pt is receiving both chemo and radiation therapy. Last session for chemo 06/2024.  -ensure Heme/onc following  -palliative care consult in the AM  -Zofran PRN for nausea Follows with Dr. Delaney at [facility]. Pt is receiving both chemo and radiation therapy. Last session for chemo 06/2024.    Plan:    - Consult rad/onc   - Confirm that heme/onc is following  - Zofran PRN for nausea Pt with right sided 1+ pitting edema to knee, no edema on L, 2+ DP pulses.    Plan:  - F/u b/l LE doppler

## 2024-07-30 NOTE — DIETITIAN INITIAL EVALUATION ADULT - PROBLEM SELECTOR PLAN 5
Follows with Dr. Delaney at [facility]. Pt is receiving both chemo and radiation therapy. Last session for chemo 06/2024.  -ensure Heme/onc following  -palliative care consult in the AM  -Zofran PRN for nausea

## 2024-07-30 NOTE — CONSULT NOTE ADULT - ATTENDING COMMENTS
73 yo F with PMHx HTN, HLD, DM, rheumatoid arthritis, CKD, NSCLC (c/b brain metastases) BIBEMS following acute episode of altered mental status at outpatient appointment, s/p negative stroke code imaging, found to be in hypertensive emergency, improved s/p ED IV anti-HTN administration, admitted for further evaluation and management.     This patient is very well known to me.  She has a long history of uncontrolled diabetes due to primarily non-compliance with her insulin and dietary restrictions.  She went to her f/u with Dr.Wernicke and was found to be altered after she fell so was sent to ED.  During my evaluation she appears to be at baseline. She is AOx4.  It is possible some of her symptoms are from SRS. She would benefit from steroids, but with her level of hyperglycemia this might prove difficult.  Please consider starting steroids to control the rebound edema from SRS.  Plan to re-start her chemotherapy in one week.  No other oncological work up inpatient.  Appreciate epilepsy involvement. 75 yo F with PMHx HTN, HLD, DM, rheumatoid arthritis, CKD, NSCLC (c/b brain metastases) BIBEMS following acute episode of altered mental status at outpatient appointment, s/p negative stroke code imaging, found to be in hypertensive emergency, improved s/p ED IV anti-HTN administration, admitted for further evaluation and management.     This patient is very well known to me.  She has a long history of uncontrolled diabetes due to primarily non-compliance with her insulin and dietary restrictions.  She went to her f/u with Dr.Wernicke and was found to be altered after she fell so was sent to ED.  During my evaluation she appears to be at baseline. She is AOx4.    It is possible some of her symptoms are from SRS. She would benefit from steroids, but with her level of hyperglycemia this might prove difficult.  --Please consider starting steroids to control the rebound edema from SRS.  --Plan to re-start her chemotherapy in one week.  --No other oncological work up inpatient.  --Appreciate epilepsy involvement.  --Discussed with neurosurgery.  --Monitor BG closely and adjust insulin dose as needed.

## 2024-07-30 NOTE — HISTORY OF PRESENT ILLNESS
[FreeTextEntry1] : 73 y/o female, former smoker, with PMHx of CKD, DM, RA, HTN, metastatic NSCLC who completed RIGHT brain RT of 2700cGy in 3Fx from 7/20/2023 - 8/15/2023. Her recent MRI showed two new peripherally enhancing lesions consistent with new brain metastases. She now presents for further radiation to the brain. She is referred by Dr. Delaney due to recent brain MRI with peripherally enhancing lesions c/w brain metastasis. Peripherally enhancing lesion right parietal cortex 1.0 x 0.9 cm & left parietal cortex measures approximately 0.7 cm.   She received 4 cycles of carboplatin/Taxol/pembrolizumab from 9/7/23 - 1/9/2024. She then received 3 cycles of pembro from 3/12/2024 - 5/1/2024 (6/6/24 cycle 4 delayed due to fall and rehab placement post hospitalization).   7/30/2024 - OTV Patient has received 1800/2700cGy of radiation to the brain. Today she states.....   7/23/2024 - OTV- Ms. Minor has completed 2/3 Fx or 1800/2700cGy radiaiton to brain. Overall she is doing well. She denies any headaches, lethargy or weakness. Appetite and energy level ifair. She ambulates with walker. She will continue her planned RT treatments.  7/1/2024 - She presents today via telehealth for consultation of new brain mets. She states her only symptom which she did not know was a symptom was she has had frequent falls recently. She is a Left handed lady, denies dizziness, dv/bv, weakness or numbness/tingling. She has headaches which are becoming more frequent. She denies headache today.    06/14/2024 - MR BRAIN  INTERPRETATION:   Clinical indication: Non-small cell lung cancer. Restaging unremarkable MRI of the brain was performed sagittal T1 axial T1 and T2 T2 FLAIR diffusion and susceptibility sequence. The patient was injected with approximately 6 cc gadavist IV with 1.5 cc of contrast discarded. Sagittal coronal and axial T1 weighted sequence was performed   This exam is compared prior contrast enhanced brain MRI performed on February 23, 2024   Abnormal lesions involving the posterior fossa and supratentorial region are seen.   Stable tiny nonenhancing cystic area is seen involving the left cerebellar region.   Parenchymal volume loss and chronic microvessel ischemic changes again seen.   Peripherally enhancing lesion is seen involving the right parietal cortex with surrounding edema. This is best seen on series 802 image 43 measures approximately 1.0 x 0.9 cm.   Peripherally enhancing lesion seen involving the left parietal cortex with surrounding edema. This best seen on series 2 image 48 and measures approximately 0.7 cm.   No acute hemorrhage seen   No significant shift or herniation.   Evaluation of the diffusion weighted sequence demonstrates no abnormal areas of restricted diffusion to suggest acute infarct.   The large vessels demonstrate normal flow voids.   The visualized paranasal sinuses mastoid and middle ear regions appear clear.   Enlargement of both globes are identified. Please correlate clinically.    IMPRESSION: Peripherally enhancing lesions are identified as described above. These findings are likely compatible with metastasis (progression of patient's underlying disease process) given patient's history.      06/14/2024 - CT CHEST, ABDOMEN AND PELVIS  FINDINGS: CHEST:   LUNGS AND LARGE AIRWAYS: Patent central airways. Interval increase in left upper lobe lesion abutting the major fissure (5, 123), now measuring 3.2 x 1.7 cm, previously 1.8 x 0.5 cm.   PLEURA: No pleural effusion.  ABDOMEN AND PELVIS:   LIVER: Left hepatic lesion appears increased now measuring 2.7 x 3.0 cm (3, 53), previously 2.5 x 1.8 cm, when remeasured.  PANCREAS: 1.2 cm cystic lesion in the body, unchanged.   IMPRESSION:   Interval increase in left upper lobe pulmonary lesion.   Suggestion of increase in left hepatic lesion, likely metastatic.   Suggestion of increased soft tissue density and enlargement of cervix, not well visualized on this exam.   No fluid collection in the subcutaneous tissues of the left gluteal region may represent an evolving hematoma from patient's recent fall in May 2024.     ________________________________________ ONCOLOGIC HISTORY: 73F with PMHx of HTN HLD, DM, R hip replacement, RA, CKD (Cr baseline ~2) presents to the  ED for unsteadiness and dizziness x1 week, underwent stroke workup with imaging findings negative for acute CVA however with incidental finding of 0.9 cm right frontal lobe brain mass suspicious for metastasis.  Body imaging showed a 2.6 x 4.1 cm somewhat spiculated mass in the EM lung.  8/15/23 - OT - Ms. Iván has completed 27 Gy / 27 Gy to the right brain.  She feels well.  No complaints.  Return for PTE.

## 2024-07-30 NOTE — DIETITIAN INITIAL EVALUATION ADULT - ADD RECOMMEND
1. Continue Consistent Carbohydrate diet.   >>Recommend nursing assistance with meals prn.   2. Monitor GI tolerance, weight trends, labs, & skin integrity.  3. Defer bowel and pain regimens to team.   4. RD to remain available for diet education/intervention prn.

## 2024-07-30 NOTE — PROGRESS NOTE ADULT - PROBLEM SELECTOR PLAN 9
Home meds: Atorvastatin Calcium 40 MG Oral at night  - c/w home me    #RA  Home meds based on surescripts: Hydroxychloroquine Sulfate 200 MG BID, sulfasalazine 500mg BID,  - confirm meds in the AM Home meds based on surescripts: Hydroxychloroquine Sulfate 200 MG BID, sulfasalazine 500mg BID,    Plan:    - confirm meds in the AM Home meds based on surescripts: Hydroxychloroquine Sulfate 200 MG BID, sulfasalazine 500mg BID,    Plan:  - holding home meds

## 2024-07-30 NOTE — DISCHARGE NOTE PROVIDER - HOSPITAL COURSE
#Discharge: do not delete       Patient is __ yo M/F with past medical history of _____ who presented with _____, found to have _____    Hospital Course (by problem):     Patient was discharged to: (home/SULMA/acute rehab/hospice, etc, and with what services – home health PT/RN? Home O2?)       New medications:  Changes to old medications:  Medications that were stopped:       Items to follow up as outpatient:      Physical exam at the time of discharge:  Constitutional: resting comfortably in bed; NAD  HEENT: NC/AT, EOMI, anicteric sclera, MMM  Neck: supple; no JVD  Respiratory: clear to auscultation bilaterally; no w/r/r  Cardiac: regular rate and rhythm; no m/r/g  GI: abdomen soft, nontender, nondistended; no rebound or guarding  Extremities: warm, no cyanosis, no peripheral edema  Musculoskeletal: NROM x4; no joint swelling, tenderness or erythema  Neurologic: AAOx3, no focal deficits, moving all extremities equally  Psychiatric: affect and characteristics of appearance, verbalizations, behaviors   #Discharge: do not delete       Patient is 75yo M/F with past medical history of NSCLC with metastases to the brain, RA, HTN, HLD, CKD IV who presented with witnessed fall found to have hypetrensive urgency and A1c of 12%.    Hospital Course (by problem): Patient is a 74 year old female with a PMH NSCLC with mets to the brain, rheumatoid arthritis, HTN, HLD and CKD IV, who presented to the ED with a fall. She was at [facility] for a f/u appointment with radiology and fell while waiting for the elevator. In ED, CT scan was negative for brain bleed, stroke workup showed negative CT angio. She was hyperglycemic to 716 and had a hypertensive urgency to 223/95. She received labetalol and nifedipine which corrected her BP down to 149/74. Home diabetes regimen was also given in ED along with KCl 40mg and 1g CTX. A1c found to be 12% and patient remained hyperglycemic to the 300's on home regimen, prompting and endocrine consult for home regimen med rec and setting up outpatient follow-up. VEEG was done while inpatient and showed no signs of epileptic activity. TTE from 2023 showed no signs of LVOT obstruction so cardiac cause of syncope not worked up. DVT of left LE was found on doppler. Neurosurgery was consulted and recommended no interventions for brain metastases and deemed full anticoagulation therapy for DVT  ____ safe vs not safe??? in setting of brain mets. During hospital stay, patient did not receive chemotherapy per heme/onc consult. Urine cultures also found growth of many gram negative rods so a 3 day course of ceftriaxone was initiated.     Patient was discharged to: (home/SULMA/acute rehab/hospice, etc, and with what services – home health PT/RN? Home O2?)       New medications:  Changes to old medications:  Medications that were stopped:       Items to follow up as outpatient: follow-up with endocrinology outpatient per endo recs. Follow up with heme/onc and rad/onc for NSCLC with brain metastases.      Physical exam at the time of discharge:  Constitutional: resting comfortably in bed; NAD  HEENT: NC/AT, EOMI, anicteric sclera, MMM  Neck: supple; no JVD  Respiratory: clear to auscultation bilaterally; no w/r/r  Cardiac: regular rate and rhythm; no m/r/g  GI: abdomen soft, nontender, nondistended; no rebound or guarding  : no suprapubic tenderness, no CVA tenderness  Extremities: warm, no cyanosis, 2+ RLE edema up to the knee, LLE non-edematous with slight tenderness to palpation  Neurologic: AOx2, slow speech, no focal deficits, moving all extremities equally  Psychiatric: affect and characteristics of appearance, verbalizations, behaviors       #Discharge: do not delete       Patient is 73yo F with past medical history of NSCLC with metastases to the brain, RA, HTN, HLD, CKD IV who presented with witnessed fall found to have hypertensive urgency and blood glucose >600.     Hospital Course (by problem):     #Fall  Patient is a 74 year old female with a PMH NSCLC with mets to the brain, rheumatoid arthritis, HTN, HLD and CKD IV, who presented to the ED with a fall. She was at [facility] for a f/u appointment with radiology and fell while waiting for the elevator. In ED, CT scan was negative for brain bleed, stroke workup showed negative CT angio. She was hyperglycemic to 716 and had a hypertensive urgency to 223/95. She received labetalol and nifedipine which corrected her BP down to 149/74. Home diabetes regimen was also given in ED along with KCl 40mg and 1g CTX. A1c found to be 12% and patient remained hyperglycemic to the 300's on home regimen, prompting and endocrine consult for home regimen med rec and setting up outpatient follow-up. VEEG was done while inpatient and showed no signs of epileptic activity. TTE from 2023 showed no signs of LVOT obstruction so cardiac cause of syncope not worked up. DVT of left LE was found on doppler. Neurosurgery was consulted and recommended no interventions for brain metastases and deemed full anticoagulation therapy for DVT  ____ safe vs not safe??? in setting of brain mets. During hospital stay, patient did not receive chemotherapy per heme/onc consult. Urine cultures also found growth of many gram negative rods so a 3 day course of ceftriaxone was initiated.    #DM2      #DVT       Patient was discharged to: (home/SULMA/acute rehab/hospice, etc, and with what services – home health PT/RN? Home O2?)       New medications:  Changes to old medications:  Medications that were stopped:       Items to follow up as outpatient: follow-up with endocrinology outpatient per endo recs. Follow up with heme/onc and rad/onc for NSCLC with brain metastases.      Physical exam at the time of discharge:  Constitutional: resting comfortably in bed; NAD  HEENT: NC/AT, EOMI, anicteric sclera, MMM  Neck: supple; no JVD  Respiratory: clear to auscultation bilaterally; no w/r/r  Cardiac: regular rate and rhythm; no m/r/g  GI: abdomen soft, nontender, nondistended; no rebound or guarding  : no suprapubic tenderness, no CVA tenderness  Extremities: warm, no cyanosis, 2+ RLE edema up to the knee, LLE non-edematous with slight tenderness to palpation  Neurologic: AOx2, slow speech, no focal deficits, moving all extremities equally  Psychiatric: affect and characteristics of appearance, verbalizations, behaviors       #Discharge: do not delete     Patient is 75yo F with past medical history of NSCLC with metastases to the brain, RA, HTN, HLD, CKD IV who presented for witnessed mechanical fall, found to have hypertensive urgency and blood glucose >600 iso missed home medication doses. Of note, pt's compliance with home medications is unclear. Currently unknown where or how patient has been getting her medications (spoke with daughter and called multiple pharmacies, but none have recently dispensed her any medications).       Hospital Course (by problem):   #Fall  Pt p/w witnessed mechanical fall but no LOC or head strike, AOx0 on admission, BP P 131/85-->223/95-->149/74, FSG >600. Stroke code called in the ED, NIHSS 0, CTH without acute pathology of stroke but notable for brain mets. No leukocytosis, UA with 8 WBC and many bacteria but pt asymptomatic, s/p CTX 1g in ED. On later evaluation, pt returned to baseline mentation (AOx3), says she recalls her fall and claims that she tripped. Neurological exam initially significant for shuffling and narrow gait, cogwheeling, slow speech, and resting tremors in bilateral hands and chin but these symptoms largely resolved later in admission. MRI in June 2024 significant for 2 new brain metastases. vEEG noted mild excess intermittent slow wave activity shifting between hemispheres but no seizure activity. Neurology was consulted, no Parkinson's diagnosis. Lower c/f infection or cardiac syncope given EKG NSR and TTE in 2023 noted normal LV size and systolic function, moderate LVH.     #Hypertensive urgency  BP in /95, EKG NSR with PVCs, S/p labetalol 20mg x2, nifedipine 30mg x1. Pt continued on home Metoprolol Succinate ER 100mg qd and home nifedipine ER 60mg qd but held home amlodipine 10mg qd; BPs remained controlled on this regimen throughout rest of admission.  --Discharged on: METOPROLOL SUCCINATE ER 100mg qd, NIFEDIPINE ER 60mg qd    #DM2  Blood glucose 716 on admission, A1c 12.5, pt sometimes misses doses of her home insulin regimen. Endocrine consulted, recommend Lantus 32 units bedtime and Lispro 18 units premeal.   --Discharged on: LANTUS 32 units bedtime and LISPRO 18 units premeal    #DVT  Pt presented with unilateral RLE 2+ pitting edema. Bilateral LE duplex u/s revealed DVT of LEFT LE (non-edematous) but negative for DVT on Right LE (edematous). Per neurosurgery, ok to therapeutically anticoagulate despite brain metastases. PT was therapeutically anticoagulated with Lovenox, discharged on Eliquis.  --Discharged on: ELIQUIS *************    #DUNIA on CKD  Baseline Cr ~1.2. On admission Cr 1.38, elevated likely 2/2 hypertensive emergency, Cr downtrended to 1.1 s/p 2L NS. However, Cr elevated to 1.48 on 7/31, likely IV contrast-induced nephropathy iso CTA H/N on 7/29, Cr 1.26 wnl s/p 1L NS.    #UTI  Pt denies dyuria but notes increased urinary frequency. Urine culture growing Klebsiella variicola, sensitivities pending. Received CTX 1g x1 on 7/29, then CTX 1g qd 7/31-8/*****. Given pt has h/o MDRO UTI, ID consulted, recommend ********    #NSCLC metastatic to brain.   Follows with Dr. Delaney at [facility]. Last chemo session in 6/2024. Heme onc and rad onc consulted during admission, no chemotherapy indicated during admission, pt received 1 session of radiation therapy during admission. Per heme-onc, plan to re-start her chemotherapy in one week.    Patient was discharged to: home with home PT + home health aide     New medications:  Changes to old medications:  Medications that were stopped:       Items to follow up as outpatient: UCx growing Klebsiella voriicola, f/u sensitivites. follow-up with endocrinology outpatient per endo recs. Follow up with heme/onc and rad/onc for NSCLC with brain metastases.      Physical exam at the time of discharge:  Constitutional: resting comfortably in bed; NAD  HEENT: NC/AT, EOMI, anicteric sclera, MMM  Neck: supple; no JVD  Respiratory: clear to auscultation bilaterally; no w/r/r  Cardiac: regular rate and rhythm; no m/r/g  GI: abdomen soft, nontender, nondistended; no rebound or guarding  : no suprapubic tenderness, no CVA tenderness  Extremities: warm, no cyanosis, 2+ RLE edema up to the knee, LLE non-edematous with slight tenderness to palpation  Neurologic: AOx3, slow speech, no focal deficits, moving all extremities equally  Psychiatric: normal affect and characteristics of appearance, verbalizations, behaviors #Discharge: do not delete     Patient is 75yo F with past medical history of NSCLC with metastases to the brain, RA, HTN, HLD, CKD IV who presented for witnessed mechanical fall, found to have hypertensive urgency and blood glucose >600 iso missed home medication doses. Of note, pt's compliance with home medications is unclear. Currently unknown where or how patient has been getting her medications (spoke with daughter and called multiple pharmacies, but none have recently dispensed her any medications).       Hospital Course (by problem):   #Fall  Pt p/w witnessed mechanical fall but no LOC or head strike, AOx0 on admission, BP P 131/85-->223/95-->149/74, FSG >600. Stroke code called in the ED, NIHSS 0, CTH without acute pathology of stroke but notable for brain mets. No leukocytosis, UA with 8 WBC and many bacteria but pt asymptomatic, s/p CTX 1g in ED. On later evaluation, pt returned to baseline mentation (AOx3), says she recalls her fall and claims that she tripped. Neurological exam initially significant for shuffling and narrow gait, cogwheeling, slow speech, and resting tremors in bilateral hands and chin but these symptoms largely resolved later in admission. MRI in June 2024 significant for 2 new brain metastases. vEEG noted mild excess intermittent slow wave activity shifting between hemispheres but no seizure activity. Neurology was consulted, no Parkinson's diagnosis. Lower c/f infection or cardiac syncope given EKG NSR and TTE in 2023 noted normal LV size and systolic function, moderate LVH.     #Hypertensive urgency  BP in /95, EKG NSR with PVCs, S/p labetalol 20mg x2, nifedipine 30mg x1. Pt continued on home Metoprolol Succinate ER 100mg qd and home nifedipine ER 60mg qd but held home amlodipine 10mg qd; BPs remained controlled on this regimen throughout rest of admission.  --Discharged on: METOPROLOL SUCCINATE ER 100mg qd, NIFEDIPINE ER 60mg qd    #DM2  Blood glucose 716 on admission, A1c 12.5, pt sometimes misses doses of her home insulin regimen. Endocrine consulted, recommend Lantus 32 units bedtime and Lispro 18 units premeal.   --Discharged on: LANTUS 32 units bedtime and LISPRO 18 units premeal    #DVT  Pt presented with unilateral RLE 2+ pitting edema. Bilateral LE duplex u/s revealed DVT of LEFT LE (non-edematous) but negative for DVT on Right LE (edematous). Per neurosurgery, ok to therapeutically anticoagulate despite brain metastases. PT was therapeutically anticoagulated with Lovenox, discharged on Eliquis.  --Discharged on: ELIQUIS *************    #DUNIA on CKD  Baseline Cr ~1.2. On admission Cr 1.38, elevated likely 2/2 hypertensive emergency, Cr downtrended to 1.1 s/p 2L NS. However, Cr elevated to 1.48 on 7/31, likely IV contrast-induced nephropathy iso CTA H/N on 7/29, Cr 1.26 wnl s/p 1L NS.    #UTI  Pt denies dyuria but notes increased urinary frequency. Urine culture growing Klebsiella variicola, sensitivities pending. Received CTX 1g x1 on 7/29, then CTX 1g qd 7/31-8/*****. Given pt has h/o MDRO UTI, ID consulted, recommend ********    #NSCLC metastatic to brain.   Follows with Dr. Delaney at [facility]. Last chemo session in 6/2024. Heme onc and rad onc consulted during admission, no chemotherapy indicated during admission, pt received 1 session of radiation therapy during admission. Per heme-onc, plan to re-start her chemotherapy in one week.    Patient was discharged to: home with home PT + home health aide     New medications:  Changes to old medications:  Medications that were stopped:       Items to follow up as outpatient: UCx growing Klebsiella voriicola, f/u sensitivites. follow-up with endocrinology outpatient per endo recs. Follow up with heme/onc and rad/onc for NSCLC with brain metastases.      Physical exam at the time of discharge:  Constitutional: resting comfortably in bed; NAD  HEENT: NC/AT, EOMI, anicteric sclera, MMM  Neck: supple; no JVD  Respiratory: clear to auscultation bilaterally; no w/r/r  Cardiac: regular rate and rhythm; no m/r/g  GI: abdomen soft, nontender, nondistended; no rebound or guarding  : no suprapubic tenderness, no CVA tenderness  Extremities: warm, no cyanosis, 2+ RLE edema up to the knee, LLE non-edematous with slight tenderness to palpation  Neurologic: AOx3, slow speech, no focal deficits, moving all extremities equally  Psychiatric: normal affect and characteristics of appearance, verbalizations, behaviors *** DOCUMENTATION OF MEDICAL NECESSITY ***    I am writing on behalf of Ms. Noemi madera to document the medical necessity of increased home health aide hours for her diagnoses of Type 2 Diabetes Mellitus [ICD-10-CM: E11] necessitating Long-term (current) insulin use [ICD-10-CM: Z79.4], as well as dementia [ICD-10-CM: F03.90].     For her diabetes, Ms. Franklin requires administration of insulin four times a day total: once before every meal, and once before bedtime. Due to her dementia, Ms. Madera will require reminders to administer insulin. Risks of missed insulin doses include confusion/altered mental status, diabetic ketoacidosis requiring admission to the ICU, and death. Due to her dementia, she experiences limitations in memory and executive functioning that severely impact her ability to complete IADLS/ADLS independently, including cooking, cleaning and remembering to take medications.   --------------------------------------------------------------------    #Discharge: do not delete     Patient is 73yo F with past medical history of NSCLC with metastases to the brain, RA, HTN, HLD, CKD IV who presented for witnessed mechanical fall, found to have hypertensive urgency and blood glucose >600 iso missed home medication doses. Of note, pt's compliance with home medications is unclear. Currently unknown where or how patient has been getting her medications (spoke with daughter and called multiple pharmacies, but none have recently dispensed her any medications).       Hospital Course (by problem):   #Fall  Pt p/w witnessed mechanical fall but no LOC or head strike, AOx0 on admission, BP P 131/85-->223/95-->149/74, FSG >600. Stroke code called in the ED, NIHSS 0, CTH without acute pathology of stroke but notable for brain mets. No leukocytosis, UA with 8 WBC and many bacteria but pt asymptomatic, s/p CTX 1g in ED. On later evaluation, pt returned to baseline mentation (AOx3), says she recalls her fall and claims that she tripped. Neurological exam initially significant for shuffling and narrow gait, cogwheeling, slow speech, and resting tremors in bilateral hands and chin but these symptoms largely resolved later in admission. MRI in June 2024 significant for 2 new brain metastases. vEEG noted mild excess intermittent slow wave activity shifting between hemispheres but no seizure activity. Neurology was consulted, no Parkinson's diagnosis. Lower c/f infection or cardiac syncope given EKG NSR and TTE in 2023 noted normal LV size and systolic function, moderate LVH.     #Hypertensive urgency  BP in /95, EKG NSR with PVCs, S/p labetalol 20mg x2, nifedipine 30mg x1. Pt continued on home Metoprolol Succinate ER 100mg qd and home nifedipine ER 60mg qd but held home amlodipine 10mg qd; BPs remained controlled on this regimen throughout rest of admission.  --Discharged on: METOPROLOL SUCCINATE ER 100mg qd, NIFEDIPINE ER 60mg qd    #DM2  Blood glucose 716 on admission, A1c 12.5, pt sometimes misses doses of her home insulin regimen. Endocrine consulted, recommend Lantus 32 units bedtime and Lispro 18 units premeal.   --Discharged on: LANTUS 32 units bedtime and LISPRO 18 units premeal    #DVT  Pt presented with unilateral RLE 2+ pitting edema. Bilateral LE duplex u/s revealed DVT of LEFT LE (non-edematous) but negative for DVT on Right LE (edematous). Per neurosurgery, ok to therapeutically anticoagulate despite brain metastases. PT was therapeutically anticoagulated with Lovenox, discharged on Eliquis.  --Discharged on: ELIQUIS *************    #DUNIA on CKD  Baseline Cr ~1.2. On admission Cr 1.38, elevated likely 2/2 hypertensive emergency, Cr downtrended to 1.1 s/p 2L NS. However, Cr elevated to 1.48 on 7/31, likely IV contrast-induced nephropathy iso CTA H/N on 7/29, Cr 1.26 wnl s/p 1L NS.    #UTI  Pt denies dyuria but notes increased urinary frequency. Urine culture growing Klebsiella variicola, sensitivities pending. Received CTX 1g x1 on 7/29, then CTX 1g qd 7/31-8/*****. Given pt has h/o MDRO UTI, ID consulted, recommend ********    #NSCLC metastatic to brain.   Follows with Dr. Delaney at [facility]. Last chemo session in 6/2024. Heme onc and rad onc consulted during admission, no chemotherapy indicated during admission, pt received 1 session of radiation therapy during admission. Per heme-onc, plan to re-start her chemotherapy in one week.    Patient was discharged to: home with home PT + home health aide     New medications:  Changes to old medications:  Medications that were stopped:       Items to follow up as outpatient: UCx growing Klebsiella voriicola, f/u sensitivites. follow-up with endocrinology outpatient per endo recs. Follow up with heme/onc and rad/onc for NSCLC with brain metastases.      Physical exam at the time of discharge:  Constitutional: resting comfortably in bed; NAD  HEENT: NC/AT, EOMI, anicteric sclera, MMM  Neck: supple; no JVD  Respiratory: clear to auscultation bilaterally; no w/r/r  Cardiac: regular rate and rhythm; no m/r/g  GI: abdomen soft, nontender, nondistended; no rebound or guarding  : no suprapubic tenderness, no CVA tenderness  Extremities: warm, no cyanosis, 2+ RLE edema up to the knee, LLE non-edematous with slight tenderness to palpation  Neurologic: AOx3, slow speech, no focal deficits, moving all extremities equally  Psychiatric: normal affect and characteristics of appearance, verbalizations, behaviors *** DOCUMENTATION OF MEDICAL NECESSITY ***  I am writing on behalf of Ms. Noemi madera to document the medical necessity of increased home health aide hours for her diagnoses of Type 2 Diabetes Mellitus [ICD-10-CM: E11] necessitating Long-term (current) insulin use [ICD-10-CM: Z79.4], as well as dementia [ICD-10-CM: F03.90].     For her diabetes, Ms. Franklin requires administration of insulin four times a day total: once before every meal, and once before bedtime. Due to her dementia, Ms. Madera will require assistance such as reminders to administer insulin. Due to her dementia, she experiences limitations in memory and executive functioning that severely impact her ability to complete IADLS/ADLS independently, including cooking, cleaning, and remembering to take medications. It is essential that she take insulin as directed, as missed insulin doses are associated with risk of confusion/altered mental status, diabetic ketoacidosis requiring ICU admission, and death. As such, it is essential that Ms. Madera have increased home health aide hours.  --------------------------------------------------------------------  #Discharge: do not delete     Patient is 73yo F with past medical history of NSCLC with metastases to the brain, RA, HTN, HLD, CKD IV who presented for witnessed mechanical fall, found to have hypertensive urgency and blood glucose >600 iso missed home medication doses. Of note, pt's compliance with home medications is unclear. Currently unknown where or how patient has been getting her medications (spoke with daughter and called multiple pharmacies, but none have recently dispensed her any medications).       Hospital Course (by problem):   #Fall  Pt p/w witnessed mechanical fall but no LOC or head strike, AOx0 on admission, BP P 131/85-->223/95-->149/74, FSG >600. Stroke code called in the ED, NIHSS 0, CTH without acute pathology of stroke but notable for brain mets. No leukocytosis, UA with 8 WBC and many bacteria but pt asymptomatic, s/p CTX 1g in ED. On later evaluation, pt returned to baseline mentation (AOx3), says she recalls her fall and claims that she tripped. Neurological exam initially significant for shuffling and narrow gait, cogwheeling, slow speech, and resting tremors in bilateral hands and chin but these symptoms largely resolved later in admission. MRI in June 2024 significant for 2 new brain metastases. vEEG noted mild excess intermittent slow wave activity shifting between hemispheres but no seizure activity. Neurology was consulted, no Parkinson's diagnosis. Lower c/f infection or cardiac syncope given EKG NSR and TTE in 2023 noted normal LV size and systolic function, moderate LVH.     #Hypertensive urgency  BP in /95, EKG NSR with PVCs, S/p labetalol 20mg x2, nifedipine 30mg x1. Pt continued on home Metoprolol Succinate ER 100mg qd and home nifedipine ER 60mg qd but held home amlodipine 10mg qd; BPs remained controlled on this regimen throughout rest of admission.  --Discharged on: METOPROLOL SUCCINATE ER 100mg qd, NIFEDIPINE ER 60mg qd    #DM2  Blood glucose 716 on admission, A1c 12.5, pt sometimes misses doses of her home insulin regimen. Endocrine consulted, recommend Lantus 32 units bedtime and Lispro 18 units premeal.   --Discharged on: LANTUS 32 units bedtime and LISPRO 18 units premeal    #DVT  Pt presented with unilateral RLE 2+ pitting edema. Bilateral LE duplex u/s revealed DVT of LEFT LE (non-edematous) but negative for DVT on Right LE (edematous). Per neurosurgery, ok to therapeutically anticoagulate despite brain metastases. PT was therapeutically anticoagulated with Lovenox, discharged on Eliquis.  --Discharged on: ELIQUIS *************    #DUNIA on CKD  Baseline Cr ~1.2. On admission Cr 1.38, elevated likely 2/2 hypertensive emergency, Cr downtrended to 1.1 s/p 2L NS. However, Cr elevated to 1.48 on 7/31, likely IV contrast-induced nephropathy iso CTA H/N on 7/29, Cr 1.26 wnl s/p 1L NS.    #UTI  Pt denies dyuria but notes increased urinary frequency. Urine culture growing Klebsiella variicola, sensitivities pending. Received CTX 1g x1 on 7/29, then CTX 1g qd 7/31-8/*****. Given pt has h/o MDRO UTI, ID consulted, recommend ********    #NSCLC metastatic to brain.   Follows with Dr. Delaney at [facility]. Last chemo session in 6/2024. Heme onc and rad onc consulted during admission, no chemotherapy indicated during admission, pt received 1 session of radiation therapy during admission. Per heme-onc, plan to re-start her chemotherapy in one week.    Patient was discharged to: home with home PT + home health aide     New medications:  Changes to old medications:  Medications that were stopped:       Items to follow up as outpatient: UCx growing Klebsiella voriicola, f/u sensitivites. follow-up with endocrinology outpatient per endo recs. Follow up with heme/onc and rad/onc for NSCLC with brain metastases.      Physical exam at the time of discharge:  Constitutional: resting comfortably in bed; NAD  HEENT: NC/AT, EOMI, anicteric sclera, MMM  Neck: supple; no JVD  Respiratory: clear to auscultation bilaterally; no w/r/r  Cardiac: regular rate and rhythm; no m/r/g  GI: abdomen soft, nontender, nondistended; no rebound or guarding  : no suprapubic tenderness, no CVA tenderness  Extremities: warm, no cyanosis, 2+ RLE edema up to the knee, LLE non-edematous with slight tenderness to palpation  Neurologic: AOx3, slow speech, no focal deficits, moving all extremities equally  Psychiatric: normal affect and characteristics of appearance, verbalizations, behaviors *** DOCUMENTATION OF MEDICAL NECESSITY ***  I am writing on behalf of Ms. Noemi madera to document the medical necessity of increased home health aide hours for her diagnoses of Type 2 Diabetes Mellitus [ICD-10-CM: E11] necessitating Long-term (current) insulin use [ICD-10-CM: Z79.4], as well as dementia [ICD-10-CM: F03.90].     For her diabetes, Ms. Franklin requires administration of insulin four times a day total: once before every meal, and once before bedtime. Due to her dementia, Ms. Madera will require assistance such as reminders to administer insulin. Due to her dementia, she experiences limitations in memory and executive functioning that severely impact her ability to complete IADLS/ADLS independently, including cooking, cleaning, and remembering to take medications. It is essential that she takes insulin as directed, as missed insulin doses are associated with risk of confusion/altered mental status, diabetic ketoacidosis requiring ICU admission, and death. As such, it is essential that Ms. Madera have increased home health aide hours to assist with insulin administration.   --------------------------------------------------------------------  #Discharge: do not delete     Patient is 73yo F with past medical history of NSCLC with metastases to the brain, RA, HTN, HLD, CKD IV who presented for witnessed mechanical fall, found to have hypertensive urgency and blood glucose >600 iso missed home medication doses. Of note, pt's compliance with home medications is unclear. Currently unknown where or how patient has been getting her medications (spoke with daughter and called multiple pharmacies, but none have recently dispensed her any medications).       Hospital Course (by problem):   #Fall  Pt p/w witnessed mechanical fall but no LOC or head strike, AOx0 on admission, BP P 131/85-->223/95-->149/74, FSG >600. Stroke code called in the ED, NIHSS 0, CTH without acute pathology of stroke but notable for brain mets. No leukocytosis, UA with 8 WBC and many bacteria but pt asymptomatic, s/p CTX 1g in ED. On later evaluation, pt returned to baseline mentation (AOx3), says she recalls her fall and claims that she tripped. Neurological exam initially significant for shuffling and narrow gait, cogwheeling, slow speech, and resting tremors in bilateral hands and chin but these symptoms largely resolved later in admission. MRI in June 2024 significant for 2 new brain metastases. vEEG noted mild excess intermittent slow wave activity shifting between hemispheres but no seizure activity. Neurology was consulted, no Parkinson's diagnosis. Lower c/f infection or cardiac syncope given EKG NSR and TTE in 2023 noted normal LV size and systolic function, moderate LVH.     #Hypertensive urgency  BP in /95, EKG NSR with PVCs, S/p labetalol 20mg x2, nifedipine 30mg x1. Pt continued on home Metoprolol Succinate ER 100mg qd and home nifedipine ER 60mg qd but held home amlodipine 10mg qd; BPs remained controlled on this regimen throughout rest of admission.  --Discharged on: NIFEDIPINE ER 60mg qd    #DM2  Blood glucose 716 on admission, A1c 12.5, pt sometimes misses doses of her home insulin regimen. Endocrine consulted, recommend Lantus 32 units bedtime and Lispro 18 units premeal.   --Discharged on: LANTUS 32 units bedtime and ADEMELOG 18 units premeal    #DVT  Pt presented with unilateral RLE 2+ pitting edema. Bilateral LE duplex u/s revealed DVT of LEFT LE (non-edematous) but negative for DVT on Right LE (edematous). Per neurosurgery, ok to therapeutically anticoagulate despite brain metastases. PT was therapeutically anticoagulated with Lovenox, discharged on Eliquis.  --Discharged on: ELIQUIS starter pack: 10mg BID until Aug 8th, then Eliquis 5mg BID for the following 21 days    #DUNIA on CKD  Baseline Cr ~1.2. On admission Cr 1.38, elevated likely 2/2 hypertensive emergency, Cr downtrended to 1.1 s/p 2L NS. However, Cr elevated to 1.48 on 7/31, likely IV contrast-induced nephropathy iso CTA H/N on 7/29, Cr 1.26 wnl s/p 1L NS.    #UTI  Pt denies dysuria but notes increased urinary frequency. Pt has h/o MDRO UTI. Urine culture growing Klebsiella variicola, sensitive to cephalosporins. Received CTX 1g x1 on 7/29, then CTX 1g qd 7/31-8/1.   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   Follows with Dr. Delaney at [facility]. Last chemo session in 6/2024. Heme onc and rad onc consulted during admission, no chemotherapy indicated during admission, pt received 1 session of radiation therapy during admission. Per heme-onc, plan to re-start her chemotherapy in one week.    Patient was discharged to: home with home PT     Discharge medications: cefpodoxime, Eliquis, nifedipine, insulin      Items to follow up as outpatient: follow-up with endocrinology outpatient per endo recs. Follow up with heme/onc and rad/onc for NSCLC with brain metastases.      Physical exam at the time of discharge:  Constitutional: resting comfortably in bed; NAD  HEENT: NC/AT, EOMI, anicteric sclera, MMM  Neck: supple; no JVD  Respiratory: clear to auscultation bilaterally; no w/r/r  Cardiac: regular rate and rhythm; no m/r/g  GI: abdomen soft, nontender, nondistended; no rebound or guarding  : no suprapubic tenderness, no CVA tenderness  Extremities: warm, no cyanosis, 2+ RLE edema up to the knee, LLE non-edematous with slight tenderness to palpation  Neurologic: AOx3, slow speech, no focal deficits, moving all extremities equally  Psychiatric: normal affect and characteristics of appearance, verbalizations, behaviors *** DOCUMENTATION OF MEDICAL NECESSITY ***  I am writing on behalf of Ms. Noemi madera to document the medical necessity of increased home health aide hours for her diagnoses of Type 2 Diabetes Mellitus [ICD-10-CM: E11] necessitating Long-term (current) insulin use [ICD-10-CM: Z79.4], as well as dementia [ICD-10-CM: F03.90].     For her diabetes, Ms. Franklin requires administration of insulin four times a day total: once before every meal, and once before bedtime. Due to her dementia, Ms. Madera will require assistance such as reminders to administer insulin. Due to her dementia, she experiences limitations in memory and executive functioning that severely impact her ability to complete IADLS/ADLS independently, including cooking, cleaning, and remembering to take medications. It is essential that she takes insulin as directed, as missed insulin doses are associated with risk of confusion/altered mental status, diabetic ketoacidosis requiring ICU admission, and death. As such, it is essential that Ms. Madera have increased home health aide hours to assist with insulin administration.   --------------------------------------------------------------------  #Discharge: do not delete     Patient is 73yo F with past medical history of NSCLC with metastases to the brain, RA, HTN, HLD, CKD IV who presented for witnessed mechanical fall, found to have hypertensive urgency and blood glucose >600 iso missed home medication doses. Of note, pt's compliance with home medications is unclear. Currently unknown where or how patient has been getting her medications (spoke with daughter and called multiple pharmacies, but none have recently dispensed her any medications).       Hospital Course (by problem):   #Fall  Pt p/w witnessed mechanical fall but no LOC or head strike, AOx0 on admission, BP P 131/85-->223/95-->149/74, FSG >600. Stroke code called in the ED, NIHSS 0, CTH without acute pathology of stroke but notable for brain mets. No leukocytosis, UA with 8 WBC and many bacteria but pt asymptomatic, s/p CTX 1g in ED. On later evaluation, pt returned to baseline mentation (AOx3), says she recalls her fall and claims that she tripped. Neurological exam initially significant for shuffling and narrow gait, cogwheeling, slow speech, and resting tremors in bilateral hands and chin but these symptoms largely resolved later in admission. MRI in June 2024 significant for 2 new brain metastases. vEEG noted mild excess intermittent slow wave activity shifting between hemispheres but no seizure activity. Neurology was consulted, no Parkinson's diagnosis. Lower c/f infection or cardiac syncope given EKG NSR and TTE in 2023 noted normal LV size and systolic function, moderate LVH.     #Hypertensive urgency  BP in /95, EKG NSR with PVCs, S/p labetalol 20mg x2, nifedipine 30mg x1. Pt continued on home Metoprolol Succinate ER 100mg qd and home nifedipine ER 60mg qd but held home amlodipine 10mg qd; BPs remained controlled on this regimen throughout rest of admission.  --Discharged on: NIFEDIPINE ER 60mg qd    #DM2  Blood glucose 716 on admission, A1c 12.5, pt sometimes misses doses of her home insulin regimen. Endocrine consulted, recommend Lantus 32 units bedtime and Lispro 18 units premeal.   --Discharged on: LANTUS 32 units bedtime and ADEMELOG 18 units premeal    #DVT  Pt presented with unilateral RLE 2+ pitting edema. Bilateral LE duplex u/s revealed DVT of LEFT LE (non-edematous) but negative for DVT on Right LE (edematous). Per neurosurgery, ok to therapeutically anticoagulate despite brain metastases. PT was therapeutically anticoagulated with Lovenox, discharged on Eliquis.  --Discharged on: ELIQUIS starter pack: 10mg BID until Aug 8th, then Eliquis 5mg BID for the following 21 days    #DUNIA on CKD  Baseline Cr ~1.2. On admission Cr 1.38, elevated likely 2/2 hypertensive emergency, Cr downtrended to 1.1 s/p 2L NS. However, Cr elevated to 1.48 on 7/31, likely IV contrast-induced nephropathy iso CTA H/N on 7/29, Cr 1.26 wnl s/p 1L NS.    #UTI  Pt denies dysuria but notes increased urinary frequency. Pt has h/o MDRO UTI. Urine culture growing Klebsiella variicola, sensitive to cephalosporins. Received CTX 1g x1 on 7/29, then CTX 1g qd 7/31-8/1.   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   Follows with Dr. Delaney at [facility]. Last chemo session in 6/2024. Heme onc and rad onc consulted during admission, no chemotherapy indicated during admission, pt received 1 session of radiation therapy during admission. Per heme-onc, plan to re-start her chemotherapy in one week.    Patient was discharged to: home with home PT     Discharge medications: cefpodoxime, Eliquis, nifedipine, insulin    Items to follow up as outpatient: follow-up with endocrinology outpatient per endo recs. Follow up with heme/onc and rad/onc for NSCLC with brain metastases.    Physical exam at the time of discharge:  Constitutional: resting comfortably in bed; NAD  HEENT: NC/AT, EOMI, anicteric sclera, MMM  Neck: supple; no JVD  Respiratory: clear to auscultation bilaterally; no w/r/r  Cardiac: regular rate and rhythm; no m/r/g  GI: abdomen soft, nontender, nondistended; no rebound or guarding  : no suprapubic tenderness, no CVA tenderness  Extremities: warm, no cyanosis, 2+ RLE edema up to the knee, LLE non-edematous with slight tenderness to palpation  Neurologic: AOx3, slow speech, no focal deficits, moving all extremities equally  Psychiatric: normal affect and characteristics of appearance, verbalizations, behaviors

## 2024-07-30 NOTE — DIETITIAN INITIAL EVALUATION ADULT - EDUCATION DIETARY MODIFICATIONS
RD educated on nutrition therapy for blood glucose control, ?comprehension in setting of pt cognitive status. Educated on [facility] program and submitted pt referral. Pt aware RD remains available for additional questions/concerns.

## 2024-07-30 NOTE — CONSULT NOTE ADULT - ASSESSMENT
I M    74 y o F w/ PMH of T2DM, NSCLC adenocarcinoma with mets to brain (Dx 2023), s/p XRT, on chemo, R hip replacement, RA, CKD IIIA, HTN and HLD p/w mechanical fall and found to have hypertensive urgency and hyperglycemia    Problem/Plan - 1:  ·  Problem: Other encephalopathy.   ·  Plan: Pt p/w mechanical fall and AOx0 on admission. Stroke code called in the ED. CTH without acute pathology of stroke but notable for brain mets. Neurological exam significant for shuffling and narrow gait. Reportedly had past mechanical falls but no LOC, UA with 8 WBC and many bacteria but asymptomatic per patient. s/p CTX 1g. Etiology of encephalopathy possibly 2/2 brain mets vs infection (low suspicion) vs hypertensive emergency. Pt is AOx3 now.  TTE 2023:  Normal left ventricular size and systolic function. Moderate symmetric left ventricular hypertrophy  - f/u UCx  - f/u TTE  - f/u daily BMP  - PT/OT eval.    Problem/Plan - 2:  ·  Problem: Hypertensive emergency.   ·  Plan: On admission BP elevated to 223/95. EKG NSR with PVCs .S/p  labetalol 20mg x2, nifedipine 30mg x1. Home meds based on HIEE and surescripts: Amlodipine 10mg qd, Metoprolol Succinate ER 100mg qd, nifedipine ER 60mg qd  - c/w home meds  - formal med rec in the AM  - f/u TTE.    Problem/Plan - 3:  ·  Problem: Acute hyperglycemia.   ·  Plan: Per patient, is compliant with insulin regimen. But admits that she is not on consistent carb diet. Noted to have ED visits in the past with hyperglycemia. On admission, blood glucose >700. BHB 0.5. Low concern for DKA as ABG w/o acidosis or anion gap. s/p 15U insulin in the ED and FSG improved to 289. Pt is asymptomatic.  - re-started home insulin regimen  RESOLVED.    Problem/Plan - 4:  ·  Problem: Type II diabetes mellitus.   ·  Plan: Per outpatient Endo note 7/3/24 - A1C goal <8.5%. A1C - 9% (7/3/24). Current regimen: Lantus 18 units daily, Novolog 8 units with meals. outpatient Endocrinologist Dr. Esparza  - c/w home regimen - 18U lantus and 8U lispro  - endocrinology consult in the AM.    Problem/Plan - 5:  ·  Problem: NSCLC metastatic to brain.   ·  Plan: Follows with Dr. Delaney at [facility]. Pt is receiving both chemo and radiation therapy. Last session for chemo 06/2024.  -ensure Heme/onc following  -palliative care consult in the AM  -Zofran PRN for nausea.    Problem/Plan - 6:  ·  Problem: Acute kidney injury superimposed on CKD.   ·  Plan: Baseline Cr ~1.2. On admission Cr 1.38-->1.2 s/p 2L NS. likely iso hypertensive emergency.  - f/u daily BMP  RESOLVED.    Problem/Plan - 7:  ·  Problem: Lower extremity edema.   ·  Plan: Pt with L sided pitting edema. No edema on R, No pain, 2+ pulses. Per patient, had edema in the past, not sure if unilateral  - f/u b/l LE doppler.    Problem/Plan - 8:  ·  Problem: Elevated troponin.   ·  Plan: Troponin 63--> 55. 2/2 demand ischemia.  RESOLVED.    Problem/Plan - 9:  ·  Problem: Hyperlipidemia.   ·  Plan: Home meds: Atorvastatin Calcium 40 MG Oral at night  - c/w home me    #RA  Home meds based on surescripts: Hydroxychloroquine Sulfate 200 MG BID, sulfasalazine 500mg BID,  - confirm meds in the AM.    Problem/Plan - 10:  ·  Problem: Prophylactic measure.   ·  Plan; Fluids: none  Electrolytes: Mg >2, K >4  Nutrition: consistent carb  Prophylaxis: lovenox  Activity: PT/OT eval  GI: none  C: FC  Dispo: [facility].

## 2024-07-30 NOTE — CONSULT NOTE ADULT - SUBJECTIVE AND OBJECTIVE BOX
HPI:  Pt  is a 73 yo F w/ PMH of T2DM, NSCLC adenocarcinoma with mets to brain (Dx 2023), s/p XRT, on chemo, R hip replacement, RA, CKD IIIA, HTN and HLD presenting for witnessed mechanical fall while waiting for elevator at [address] [place] [address]. Pt does not recall the events leading up to her hospitalization. Per ED note, pt was altered initially and unable to give history, name, date. Pt does report having multiple falls in the past and but still cannot recall what happened. Pt denies all ROS.    In the ED, pt was a stroke code but pt upon neurological examination, pt was AOx3 with no focal neurological deficits does have narrow, shuffling gate.    Vitals: 98.5, -->81 /85-->223/95-->149/74 RR18 SpO2 96%  labs: CBC wnl, FSG >600, troponin 63, Na131, BUN 30 Cr 1.38 glucose 716 BHB 0.5  UA: trace ketones, 8 WBC, numerous bacteria,   CTH: neg for acute process, brain mets  CTA H/N: mild stenosis fo the distal L M1 segment  EKG: NSR with PVCs, QTc 441  Intervention: CTX 1g, labetalol 20mg x2, nifedipine 30mg x1, Kcl 40mg x1  Consults: NSGY, neurology - stroke code (29 Jul 2024 22:58)    PERTINENT PM/SXH:   HTN (hypertension)    HLD (hyperlipidemia)    DM (diabetes mellitus), type 2    Rheumatoid arthritis    Stage 4 chronic kidney disease    Degenerative joint disease (DJD) of lumbar spine    Osteopenia    Lung cancer metastatic to brain      S/P total right hip arthroplasty      FAMILY HISTORY:  No history of lung cancer in first degree relatives according to chart  Unable to obtain due to patient's encephalopathy    ITEMS NOT CHECKED ARE NOT PRESENT  SOCIAL HISTORY:   Significant other/partner:  []  Children:  [X] 1 daughter, Chloe   Substance hx:  []   Tobacco hx:  []   Alcohol hx: []   Home Opioid hx:  [] I-Stop Reference No:  - no active Rx's / see chart note  Living Situation: [X]Home  []Long term care  []Rehab []Other  Holiness/Spiritual practice: ; Role of organized Taoist [ ] important [ ] some [ ] unable to assess  Coping: [X] well [] with difficulty [] poor coping [] unable to assess  Support system: [] strong [X] adequate [] inadequate    ADVANCE DIRECTIVES:    []MOLST  []Living Will  DECISION MAKER(s):  [] Health Care Proxy(s)  [] Surrogate(s)  [] Guardian           Name(s)/Phone Number(s):     BASELINE (I)ADLs (prior to admission):  [place]: []Total  [] Moderate []Dependent    ALLERGIES:  citrus (Urticaria)  Bactrim (Rash)    MEDICATIONS  (STANDING):  atorvastatin 40 milliGRAM(s) Oral at bedtime  dextrose 5%. 1000 milliLiter(s) (50 mL/Hr) IV Continuous <Continuous>  dextrose 5%. 1000 milliLiter(s) (100 mL/Hr) IV Continuous <Continuous>  dextrose 50% Injectable 12.5 Gram(s) IV Push once  dextrose 50% Injectable 25 Gram(s) IV Push once  dextrose 50% Injectable 25 Gram(s) IV Push once  enoxaparin Injectable 40 milliGRAM(s) SubCutaneous every 24 hours  glucagon  Injectable 1 milliGRAM(s) IntraMuscular once  insulin glargine Injectable (LANTUS) 17 Unit(s) SubCutaneous at bedtime  insulin lispro Injectable (ADMELOG) 9 Unit(s) SubCutaneous three times a day before meals  metoprolol succinate  milliGRAM(s) Oral daily  NIFEdipine XL 60 milliGRAM(s) Oral every 24 hours  nystatin Cream 1 Application(s) Topical every 12 hours    MEDICATIONS  (PRN):  dextrose Oral Gel 15 Gram(s) Oral once PRN Blood Glucose LESS THAN 70 milliGRAM(s)/deciliter    Analgesic Use (Scheduled and PRNs) for past 24 hours:      PRESENT SYMPTOMS/REVIEW OF SYSTEMS: []Unable to obtain due to poor mentation/encephalopathy  Source if other than patient:  []Family   []Team     Pain: [] yes [X] no  QOL Impact -   Location -                    Aggravating Factors -  Quality -  Radiation -  Timing -  Severity (0-10 scale) -   Minimal Acceptable Level (0-10 scale) -    CPOT Score:  (Pain Assessment Scale for Critically Ill)    PAIN AD Score:  (Nonverbal Pain Assessment Scale)    Dyspnea:                           []Mild  []Moderate []Severe  Anxiety:                             []Mild []Moderate []Severe  Fatigue:                             []Mild []Moderate []Severe  Nausea:                             []Mild []Moderate []Severe  Loss of Appetite:              []Mild []Moderate []Severe  Constipation:                    []Mild []Moderate []Severe    Other Symptoms:  [x]All Other Review Of Systems Negative     Palliative Performance Status Version 2:  60%  (Functional Assessment Tool)    PHYSICAL EXAM:  GENERAL:  [X]Alert  []Oriented x   []Lethargic  []Cachexia  []Unarousable  [X]Verbal  []Non-Verbal  Behavioral:   []Anxiety  []Delirium []Agitation [X]Cooperative  HEENT:  [X]Normal   []Dry mouth   []ET Tube/Trach  []Oral lesions  PULMONARY:   [X]Clear []Tachypnea  []Audible excessive secretions  []Normal Work of Breathing []Labored Breathing  []Rhonchi []Crackles []Wheezing  CARDIOVASCULAR:    [X]Regular Rate & Rhythm []Irregular []Tachy  []Festus  GASTROINTESTINAL:  [X]Soft  []Distended   []+BS  [X]Non tender []Tender  []PEG []OGT/ NGT  Last BM:  GENITOURINARY:  [X]Normal [] Incontinent   []Oliguria/Anuria   []Salazar  MUSCULOSKELETAL:   []Normal   [X]Weakness  []Bed/Wheelchair bound []Edema  NEUROLOGIC:   [X]No focal deficits  []Cognitive impairment  []Dysphagia []Dysarthria []Paresis []Encephalopathic  SKIN:   [X]Normal   []Pressure ulcer(s)  []Rash    CRITICAL CARE:  []Shock Present  []Septic []Cardiogenic []Neurologic []Hypovolemic  []Vasopressors []Inotropes   []Respiratory failure present []Mechanical Ventilation []Non-invasive ventilatory support []High-Flow  []Acute  []Chronic []Hypoxic  []Hypercarbic  []Other organ failure    Vital Signs Last 24 Hrs  T(C): 37 (30 Jul 2024 05:21), Max: 37 (29 Jul 2024 18:15)  T(F): 98.6 (30 Jul 2024 05:21), Max: 98.6 (29 Jul 2024 18:15)  HR: 71 (30 Jul 2024 05:21) (59 - 108)  BP: 126/78 (30 Jul 2024 05:21) (126/78 - 230/100)  BP(mean): 102 (29 Jul 2024 21:00) (102 - 144)  RR: 18 (30 Jul 2024 05:21) (17 - 18)  SpO2: 96% (30 Jul 2024 05:21) (95% - 99%)    Parameters below as of 30 Jul 2024 05:21  Patient On (Oxygen Delivery Method): room air        LABS:                        12.4   7.10  )-----------( 179      ( 30 Jul 2024 05:30 )             38.7   07-30    140  |  104  |  16  ----------------------------<  220<H>  4.1   |  26  |  1.10    Ca    9.0      30 Jul 2024 05:30  Phos  2.0     07-30  Mg     1.3     07-30    TPro  6.2  /  Alb  3.2<L>  /  TBili  0.3  /  DBili  x   /  AST  21  /  ALT  13  /  AlkPhos  106  07-30    RADIOLOGY & ADDITIONAL STUDIES:  < from: CT Brain Stroke Protocol (07.29.24 @ 14:21) >  IMPRESSION:    CT HEAD:  No acute intracranial hemorrhage.  Previously noted peripherally   enhancing lesions in the bilateral temporal parietal regions better seen   on recent MRI brain. There is no mass effect.    Findings were discussed with MARIBELL Brizuela 7/29/2024 2:13 PM by Dr. Castro   with read back confirmation.    CT PERFUSION:  CT perfusion metrics as above.    CTA NECK:  No evidence of significant stenosis or occlusion.    CTA HEAD:  No large vessel occlusion. Mild stenosis of the distal left M1 segment..    < end of copied text >      REFERRALS:  [x]Social Work  []Case management []PT/OT []Chaplaincy  []Hospice  []Patient/Family Support []Massage Therapy []Music Therapy

## 2024-07-30 NOTE — PROGRESS NOTE ADULT - PROBLEM SELECTOR PLAN 3
Per patient, is compliant with insulin regimen. But admits that she is not on consistent carb diet. Noted to have ED visits in the past with hyperglycemia. On admission, blood glucose >700. BHB 0.5. Low concern for DKA as ABG w/o acidosis or anion gap. s/p 15U insulin in the ED and FSG improved to 289. Pt is asymptomatic.  - re-started home insulin regimen  RESOLVED Per patient, is compliant with insulin regimen. But admits that she is not on consistent carb diet. Noted to have ED visits in the past with hyperglycemia. On admission, blood glucose >700. BHB 0.5. Low concern for DKA as ABG w/o acidosis or anion gap. s/p 15U insulin in the ED and FSG improved to 289. Pt is asymptomatic.    Plan:  - Hyperglycemic episode has resolved  - Continue home insulin regimen Per outpatient Endo note (Dr. Esparza) 7/3/24 - A1C goal <8.5%. A1C 12.0.   Home regimen: Lantus 17 units daily, Novolog 9 units with meals.     Plan:  - c/w home regimen

## 2024-07-30 NOTE — PROGRESS NOTE ADULT - PROBLEM SELECTOR PLAN 8
Troponin 63--> 55. 2/2 demand ischemia.  RESOLVED Home meds: Atorvastatin Calcium 40 MG Oral at night  - c/w home me Home meds: Atorvastatin Calcium 40 MG Oral at night  - c/w home atorva

## 2024-07-30 NOTE — PROGRESS NOTE ADULT - PROBLEM SELECTOR PLAN 10
Fluids: none  Electrolytes: Mg >2, K >4  Nutrition: consistent carb  Prophylaxis: lovenox  Activity: PT/OT eval  GI: none  C: FC  Dispo: F Fluids: none  Electrolytes: Mg >2, K >4  Nutrition: consistent carb  Prophylaxis: lovenox  Activity: PT/OT eval  GI: none  C: FC/FC  Dispo: F

## 2024-07-30 NOTE — DIETITIAN INITIAL EVALUATION ADULT - PERTINENT MEDS FT
MEDICATIONS  (STANDING):  atorvastatin 40 milliGRAM(s) Oral at bedtime  dextrose 5%. 1000 milliLiter(s) (100 mL/Hr) IV Continuous <Continuous>  dextrose 5%. 1000 milliLiter(s) (50 mL/Hr) IV Continuous <Continuous>  dextrose 50% Injectable 25 Gram(s) IV Push once  dextrose 50% Injectable 25 Gram(s) IV Push once  dextrose 50% Injectable 12.5 Gram(s) IV Push once  enoxaparin Injectable 40 milliGRAM(s) SubCutaneous every 24 hours  glucagon  Injectable 1 milliGRAM(s) IntraMuscular once  insulin glargine Injectable (LANTUS) 17 Unit(s) SubCutaneous at bedtime  insulin lispro (ADMELOG) corrective regimen sliding scale   SubCutaneous Before meals and at bedtime  insulin lispro Injectable (ADMELOG) 9 Unit(s) SubCutaneous three times a day before meals  metoprolol succinate  milliGRAM(s) Oral daily  NIFEdipine XL 60 milliGRAM(s) Oral every 24 hours  nystatin Cream 1 Application(s) Topical every 12 hours    MEDICATIONS  (PRN):  dextrose Oral Gel 15 Gram(s) Oral once PRN Blood Glucose LESS THAN 70 milliGRAM(s)/deciliter

## 2024-07-30 NOTE — CONSULT NOTE ADULT - ASSESSMENT
******INCOMPLETE******  ******INCOMPLETE******  ******INCOMPLETE******  ******INCOMPLETE******  ******INCOMPLETE******            Heme/onc will continue to follow. Recommendations final upon attending physician attestation.  73 yo F with PMHx HTN, HLD, DM, rheumatoid arthritis, CKD, NSCLC (c/b brain metastases) BIBEMS following acute episode of altered mental status at outpatient appointment, s/p negative stroke code imaging, found to be in hypertensive emergency, improved s/p ED IV anti-HTN administration, admitted for further evaluation and management. Oncology consulted for known NSCLC w/ brain metastases.    ******INCOMPLETE******  ******INCOMPLETE******  ******INCOMPLETE******  ******INCOMPLETE******  ******INCOMPLETE******    # NSCLC w/ brain metastases  s/p SRS to the brain  Received C1 paclitaxel/pembrolizumab on 9/7/2023 at [facility]. This regimen was favored over carbo/pemetrexed/pembrolizumab due to CKD and baseline GFR less than 45.  C2 carbo/Taxol/pembro on 10/26/23 (dose reduced Taxol by 25%, carboplatin calculated dose for Cr of 1.3)    # Type 2 diabetes mellitus with hyperglycemia  -  -  -    Heme/onc will continue to follow. Recommendations final upon attending physician attestation.  75 yo F with PMHx HTN, HLD, DM, rheumatoid arthritis, CKD, NSCLC (c/b brain metastases) BIBEMS following acute episode of altered mental status at outpatient appointment, s/p negative stroke code imaging, found to be in hypertensive emergency, improved s/p ED IV anti-HTN administration, admitted for further evaluation and management. Oncology consulted for known NSCLC w/ brain metastases.    ******INCOMPLETE******  ******INCOMPLETE******  ******INCOMPLETE******  ******INCOMPLETE******  ******INCOMPLETE******    # NSCLC w/ brain metastases  s/p SRS to the brain. Established patient with Dr. Wernicke (radiation oncology).  s/[place] [facility] [place] (9/7/23-1/9/24). This regimen was favored over carbo/pemetrexed/pembrolizumab due to CKD and baseline GFR less than 45.  Then transitioned to single agent pembrolizumab x 4 cycles (3/12/24-6/6/24), with progression of disease noted on 6/14/24 imaging. Started on single agent gemcitabine on 7/11/24.  Last dose chemo 7/11/24. No show on 7/18, states she forgot appointment.  Presenting with mechanical fall, r/o syncope. Stroke code called in ED, head imaging stable from prior.  -   - neurology, neurosurgery, palliative care following, appreciate recs  - radiation oncology consulted by primary team, appreciate recs    # Type 2 diabetes mellitus with hyperglycemia  Notes limited compliance to insulin, notes 1-2 missed doses weekly.  - per primary team, resuming home insulin regimen    Heme/onc will continue to follow. Recommendations final upon attending physician attestation.  73 yo F with PMHx HTN, HLD, DM, rheumatoid arthritis, CKD, NSCLC (c/b brain metastases) BIBEMS following acute episode of altered mental status at outpatient appointment, s/p negative stroke code imaging, found to be in hypertensive emergency, improved s/p ED IV anti-HTN administration, admitted for further evaluation and management. Oncology consulted for known NSCLC w/ brain metastases.    # NSCLC w/ brain metastases  s/p SRS to the brain. Established patient with Dr. Wernicke (radiation oncology).  s/p paclitaxel/pembrolizumab at [facility] x 4 cycles (9/7/23-1/9/24). This regimen was favored over carbo/pemetrexed/pembrolizumab due to CKD and baseline GFR less than 45.  Then transitioned to single agent pembrolizumab x 4 cycles (3/12/24-6/6/24), with progression of disease noted on 6/14/24 imaging. Started on single agent gemcitabine on 7/11/24.  Last dose chemo 7/11/24. No show on 7/18, states she forgot appointment.  Presenting with mechanical fall, r/o syncope. Stroke code called in ED, head imaging stable from prior. Neuro exam unchanged.  - will follow fall workup, per primary team  - no plans for inpatient chemotherapy at this time  - neurology, neurosurgery, palliative care following, appreciate recs  - radiation oncology consulted by primary team, appreciate recs    # Type 2 diabetes mellitus with hyperglycemia  Notes limited compliance to insulin, notes 1-2 missed doses weekly.  - per primary team, resuming home insulin regimen    Heme/onc will continue to follow. Recommendations final upon attending physician attestation.

## 2024-07-30 NOTE — CONSULT NOTE ADULT - PROBLEM SELECTOR RECOMMENDATION 9
AAOx0 at presentation. Now improved. Able to have focused discussion. Workup underway for etiology.  - Workup per primary team

## 2024-07-30 NOTE — DIETITIAN INITIAL EVALUATION ADULT - OTHER INFO
74F with PMH of metastatic NSCLC (status post radiation therapy, on chemotherapy), rheumatoid arthritis, HTN, HLD, and CKDIV who presented after a fall, found to have hypertensive urgency and hyperglycemia, admitted for management.     Pt seen on 7UR for assessment. Labs and medication orders reviewed. Ordered for 17U lantus, 9U premeal admelog. Na/K WNL, Phos 2 <L>, Mg 1.3 <L>, HgbA1c 12% (7/30), POC blood glucose (7/29-7/30) >600-232. On Consistent Carbohydrate diet. Pt intermittently disoriented throughout interview, consistent with baseline per nursing. Pt reports good appetite and intake, endorses poor compliance with carbohydrate controlled diet PTA. Reports living alone at home and difficulty preparing diet compliant meals, RD educated on [employer] and pt amenable - RD submitted referral. Pt reports UBW ~140lb, endorses wt stability PTA; admission wt 135lb/61.2kg. Nutrition-focused physical examination insignificant for overt signs of nutritional wasting. Pt denies difficulty chewing/swallowing. Denies nausea/vomiting/diarrhea, reports no constipation however unable to recall last BM and no BMs recorded since admission 7/29. Pt reports no known food allergies, citrus documented per chart. No Yazidism/ethnic/cultural food preferences noted. 2+ right ankle/leg/foot edema noted, no pressure injuries documented, Fabricio score 17. See nutrition recommendations. RD to remain available.

## 2024-07-30 NOTE — DISCHARGE NOTE PROVIDER - ATTENDING DISCHARGE PHYSICAL EXAMINATION:
Attending Attestation - Patient seen and examined    Exam on day of discharge:  Appears comfortable   MMM  Normal WOB on RA, CTAB   RRR, no mrg   Abdomen soft, nontender, nondistended  Extremities warm and without edema   AOX3, no focal neuro deficits     Med Changes: See discharge recs, insulin adjustments made, nifedipine XL 60 mg daily for blood pressure   Pending labs/tests: None  Provider F/U: PCP, endocrinology, oncology     Patient stable for discharge to home     45 min spent on DC. Discussed with housestaff/     Thank you for allowing the [facility] to care for your patient. For any questions after your discharge not answered by your primary care physician or providers you have been scheduled with for follow-up above, please call [phone] and ask to speak to the Senior House Officer

## 2024-07-31 ENCOUNTER — APPOINTMENT (OUTPATIENT)
Dept: HEMATOLOGY ONCOLOGY | Facility: CLINIC | Age: 74
End: 2024-07-31

## 2024-07-31 ENCOUNTER — TRANSCRIPTION ENCOUNTER (OUTPATIENT)
Age: 74
End: 2024-07-31

## 2024-07-31 DIAGNOSIS — I82.462 ACUTE EMBOLISM AND THROMBOSIS OF LEFT CALF MUSCULAR VEIN: ICD-10-CM

## 2024-07-31 DIAGNOSIS — R82.71 BACTERIURIA: ICD-10-CM

## 2024-07-31 LAB
A1C WITH ESTIMATED AVERAGE GLUCOSE RESULT: 12.4 % — HIGH (ref 4–5.6)
ANION GAP SERPL CALC-SCNC: 8 MMOL/L — SIGNIFICANT CHANGE UP (ref 5–17)
BASOPHILS # BLD AUTO: 0.16 K/UL — SIGNIFICANT CHANGE UP (ref 0–0.2)
BASOPHILS NFR BLD AUTO: 1.9 % — SIGNIFICANT CHANGE UP (ref 0–2)
BUN SERPL-MCNC: 26 MG/DL — HIGH (ref 7–23)
CALCIUM SERPL-MCNC: 8.4 MG/DL — SIGNIFICANT CHANGE UP (ref 8.4–10.5)
CHLORIDE SERPL-SCNC: 102 MMOL/L — SIGNIFICANT CHANGE UP (ref 96–108)
CO2 SERPL-SCNC: 23 MMOL/L — SIGNIFICANT CHANGE UP (ref 22–31)
CREAT SERPL-MCNC: 1.48 MG/DL — HIGH (ref 0.5–1.3)
EGFR: 37 ML/MIN/1.73M2 — LOW
EOSINOPHIL # BLD AUTO: 0.09 K/UL — SIGNIFICANT CHANGE UP (ref 0–0.5)
EOSINOPHIL NFR BLD AUTO: 1.1 % — SIGNIFICANT CHANGE UP (ref 0–6)
ESTIMATED AVERAGE GLUCOSE: 309 MG/DL — HIGH (ref 68–114)
GLUCOSE BLDC GLUCOMTR-MCNC: 201 MG/DL — HIGH (ref 70–99)
GLUCOSE BLDC GLUCOMTR-MCNC: 247 MG/DL — HIGH (ref 70–99)
GLUCOSE BLDC GLUCOMTR-MCNC: 291 MG/DL — HIGH (ref 70–99)
GLUCOSE BLDC GLUCOMTR-MCNC: 344 MG/DL — HIGH (ref 70–99)
GLUCOSE SERPL-MCNC: 195 MG/DL — HIGH (ref 70–99)
HCT VFR BLD CALC: 38.2 % — SIGNIFICANT CHANGE UP (ref 34.5–45)
HGB BLD-MCNC: 12.1 G/DL — SIGNIFICANT CHANGE UP (ref 11.5–15.5)
IMM GRANULOCYTES NFR BLD AUTO: 8.1 % — HIGH (ref 0–0.9)
LYMPHOCYTES # BLD AUTO: 1.52 K/UL — SIGNIFICANT CHANGE UP (ref 1–3.3)
LYMPHOCYTES # BLD AUTO: 17.9 % — SIGNIFICANT CHANGE UP (ref 13–44)
MAGNESIUM SERPL-MCNC: 1.5 MG/DL — LOW (ref 1.6–2.6)
MCHC RBC-ENTMCNC: 27.3 PG — SIGNIFICANT CHANGE UP (ref 27–34)
MCHC RBC-ENTMCNC: 31.7 GM/DL — LOW (ref 32–36)
MCV RBC AUTO: 86 FL — SIGNIFICANT CHANGE UP (ref 80–100)
MONOCYTES # BLD AUTO: 0.88 K/UL — SIGNIFICANT CHANGE UP (ref 0–0.9)
MONOCYTES NFR BLD AUTO: 10.4 % — SIGNIFICANT CHANGE UP (ref 2–14)
NEUTROPHILS # BLD AUTO: 5.16 K/UL — SIGNIFICANT CHANGE UP (ref 1.8–7.4)
NEUTROPHILS NFR BLD AUTO: 60.6 % — SIGNIFICANT CHANGE UP (ref 43–77)
NRBC # BLD: 0 /100 WBCS — SIGNIFICANT CHANGE UP (ref 0–0)
PHOSPHATE SERPL-MCNC: 2.5 MG/DL — SIGNIFICANT CHANGE UP (ref 2.5–4.5)
PLATELET # BLD AUTO: 177 K/UL — SIGNIFICANT CHANGE UP (ref 150–400)
POTASSIUM SERPL-MCNC: 4 MMOL/L — SIGNIFICANT CHANGE UP (ref 3.5–5.3)
POTASSIUM SERPL-SCNC: 4 MMOL/L — SIGNIFICANT CHANGE UP (ref 3.5–5.3)
RBC # BLD: 4.44 M/UL — SIGNIFICANT CHANGE UP (ref 3.8–5.2)
RBC # FLD: 13.7 % — SIGNIFICANT CHANGE UP (ref 10.3–14.5)
SODIUM SERPL-SCNC: 133 MMOL/L — LOW (ref 135–145)
WBC # BLD: 8.5 K/UL — SIGNIFICANT CHANGE UP (ref 3.8–10.5)
WBC # FLD AUTO: 8.5 K/UL — SIGNIFICANT CHANGE UP (ref 3.8–10.5)

## 2024-07-31 PROCEDURE — 95720 EEG PHY/QHP EA INCR W/VEEG: CPT

## 2024-07-31 PROCEDURE — 99222 1ST HOSP IP/OBS MODERATE 55: CPT | Mod: GC

## 2024-07-31 PROCEDURE — 99232 SBSQ HOSP IP/OBS MODERATE 35: CPT | Mod: GC

## 2024-07-31 RX ORDER — ENOXAPARIN SODIUM 120 MG/.8ML
60 INJECTION SUBCUTANEOUS EVERY 12 HOURS
Refills: 0 | Status: DISCONTINUED | OUTPATIENT
Start: 2024-07-31 | End: 2024-07-31

## 2024-07-31 RX ORDER — INSULIN GLARGINE-YFGN 100 [IU]/ML
25 INJECTION, SOLUTION SUBCUTANEOUS AT BEDTIME
Refills: 0 | Status: DISCONTINUED | OUTPATIENT
Start: 2024-07-31 | End: 2024-08-01

## 2024-07-31 RX ORDER — MAGNESIUM SULFATE 500 MG/ML
2 VIAL (ML) INJECTION ONCE
Refills: 0 | Status: COMPLETED | OUTPATIENT
Start: 2024-07-31 | End: 2024-07-31

## 2024-07-31 RX ORDER — ENOXAPARIN SODIUM 120 MG/.8ML
60 INJECTION SUBCUTANEOUS EVERY 24 HOURS
Refills: 0 | Status: DISCONTINUED | OUTPATIENT
Start: 2024-07-31 | End: 2024-08-01

## 2024-07-31 RX ORDER — INSULIN LISPRO 100/ML
14 VIAL (ML) SUBCUTANEOUS
Refills: 0 | Status: DISCONTINUED | OUTPATIENT
Start: 2024-07-31 | End: 2024-08-01

## 2024-07-31 RX ORDER — BACTERIOSTATIC SODIUM CHLORIDE 0.9 %
1000 VIAL (ML) INJECTION
Refills: 0 | Status: DISCONTINUED | OUTPATIENT
Start: 2024-07-31 | End: 2024-08-01

## 2024-07-31 RX ORDER — ACETAMINOPHEN 500 MG
650 TABLET ORAL ONCE
Refills: 0 | Status: COMPLETED | OUTPATIENT
Start: 2024-07-31 | End: 2024-07-31

## 2024-07-31 RX ADMIN — Medication 250 MILLILITER(S): at 15:09

## 2024-07-31 RX ADMIN — Medication 8: at 17:39

## 2024-07-31 RX ADMIN — Medication 4: at 08:58

## 2024-07-31 RX ADMIN — Medication 4: at 22:26

## 2024-07-31 RX ADMIN — Medication 25 GRAM(S): at 19:31

## 2024-07-31 RX ADMIN — Medication 100 MILLIGRAM(S): at 10:58

## 2024-07-31 RX ADMIN — Medication 11 UNIT(S): at 08:59

## 2024-07-31 RX ADMIN — Medication 11 UNIT(S): at 12:50

## 2024-07-31 RX ADMIN — Medication 650 MILLIGRAM(S): at 08:41

## 2024-07-31 RX ADMIN — Medication 14 UNIT(S): at 17:40

## 2024-07-31 RX ADMIN — INSULIN GLARGINE-YFGN 25 UNIT(S): 100 INJECTION, SOLUTION SUBCUTANEOUS at 22:27

## 2024-07-31 RX ADMIN — ENOXAPARIN SODIUM 60 MILLIGRAM(S): 120 INJECTION SUBCUTANEOUS at 15:23

## 2024-07-31 RX ADMIN — NYSTATIN 1 APPLICATION(S): 100000 POWDER TOPICAL at 06:26

## 2024-07-31 RX ADMIN — NYSTATIN 1 APPLICATION(S): 100000 POWDER TOPICAL at 17:46

## 2024-07-31 RX ADMIN — Medication 650 MILLIGRAM(S): at 09:41

## 2024-07-31 RX ADMIN — ATORVASTATIN CALCIUM 40 MILLIGRAM(S): 40 TABLET, FILM COATED ORAL at 22:27

## 2024-07-31 RX ADMIN — Medication 6: at 12:50

## 2024-07-31 NOTE — CONSULT NOTE ADULT - ATTENDING COMMENTS
Pt seen on rounds this afternoon.  Is known to me from previous admissions.  74-yo woman with HTN, HLP, type 2 DM, and NSSCA of the lung with brain mets (s/p RT, still on chemo) who was admitted for altered MS after a mechanical fall.    She is currently on basal/bolus insulin to treat hyperglycemia which was initially exacerbated (> 1 year ago) by steroid therapy.  She is ostensibly on a regimen of 18 Lantus/9 units premeal, reporting glucoses at home "in the 200s."    Last chemo dose was 7/11.  She receives dexamethasone 28 mg on day of chemo and the following day.  Glucose was 453 on admission, has since decreased only to 290 as of noon today after 17 lantus last night, 9 units premeal  --Her cognitive dysfunction has been a major barrier to control, and appears distinctly worse than when I last saw her.  She is not even fully oriented.  It is highly unlikely that she is taking her insulin consistently, and ?? taking it at all.  (Her pharmacy reported that there was a 3-month gap since her last refill)  --Spoke with the pt's daughter at the time of our visit, who is aware of the problem.  Says that they were trying to arrange a transition from [facility] to an Assisted Living facility, but the waiting list was long, and the pt also refused to go after finding out that it was only a studio apartment  --Will increase her insulin to 25/14, but this does not solve the outpatient problem.

## 2024-07-31 NOTE — CONSULT NOTE ADULT - ASSESSMENT
ASSESSMENT / RECOMMENDATIONS  MICHAEL READ is a 74y Female with a past medical history of NSCLC adenocarcinoma c/b brain metastases (dx 2023) s/p XRT, on chemo, T2DM, RA, CKD IIIA, HTN, and HLD now presenting after a witnessed mechanical fall and altered mental status. Endocrinology was consulted for management of poorly controlled T2DM.    A1C: 12.4 %  BUN: 26  Creatinine: 1.48  GFR: 37  Weight: 61.2  BMI: 23.9  EF: 66% (July 2023)    # Type 2 diabetes mellitus with hyperglycemia  Poorly controlled T2DM in the setting of impaired cognition/memory. Patient missing unknown frequency of home insulin doses  - Please continue lantus 25 units at bedtime.   - Continue lispro 14 units before each meal.  - Continue lispro moderate dose sliding scale before meals and at bedtime.  - Patient's fingerstick glucose goal is 100-180 mg/dL.   - Recommend patient and family consider assisted living or additional home health aide support upon discharge to ensure full adherence to all medications  - Additional discharge recommendations to be discussed.   - Patient can follow up at discharge with [facility] by calling [phone] to make an appointment.      Case discussed with Dr. Blackwood. Primary team updated.       Willard Victoria MD  PGY1 Internal Medicine   Endocrinology Service Pager: [phone]

## 2024-07-31 NOTE — PHYSICAL THERAPY INITIAL EVALUATION ADULT - IMPAIRMENTS FOUND, PT EVAL
aerobic capacity/endurance/ergonomics and body mechanics/fine motor/gait, locomotion, and balance/gross motor/integumentary integrity/joint integrity and mobility/muscle strength

## 2024-07-31 NOTE — PROGRESS NOTE ADULT - PROBLEM SELECTOR PLAN 3
Per outpatient Endo note (Dr. Esparza) 7/3/24 - A1C goal <8.5%. A1C 12.0.   Home regimen: Lantus 17 units daily, Novolog 9 units with meals.     Plan:  - c/w home regimen Per outpatient Endo note (Dr. Esparza) 7/3/24 - A1C goal <8.5%. A1C 12.5 (7/31).   Home regimen: Lantus 17 units daily, Novolog 9 units with meals.     Plan:  - Consult endocrine for A1c of 12.5 and diabetes home regimen med rec  - c/w home regimen Patient's urinalysis and urine cultures from ED admission grossly positive. Preliminary UC growing >120,000 gram negative rods. Patient asymptomatic, afebrile, no leukocytosis. S/p 1g CTX in ED on 7/29.    Plan:    -Start 3-day course of IV CTX 1g (7/31-8/2)

## 2024-07-31 NOTE — PROGRESS NOTE ADULT - PROBLEM SELECTOR PROBLEM 4
NSCLC metastatic to brain Deep vein thrombosis (DVT) of calf muscle vein of left lower extremity Type II diabetes mellitus

## 2024-07-31 NOTE — DISCHARGE NOTE NURSING/CASE MANAGEMENT/SOCIAL WORK - NSDCPEPT PROEDMA_GEN_ALL_CORE
Yes Quality 431: Preventive Care And Screening: Unhealthy Alcohol Use - Screening: Patient not identified as an unhealthy alcohol user when screened for unhealthy alcohol use using a systematic screening method Detail Level: Detailed Quality 110: Preventive Care And Screening: Influenza Immunization: Influenza Immunization not Administered for Documented Reasons. Quality 226: Preventive Care And Screening: Tobacco Use: Screening And Cessation Intervention: Patient screened for tobacco use and is an ex/non-smoker Quality 130: Documentation Of Current Medications In The Medical Record: Current Medications Documented Quality 111:Pneumonia Vaccination Status For Older Adults: Pneumococcal Vaccination not Administered or Previously Received, Reason not Otherwise Specified

## 2024-07-31 NOTE — PROGRESS NOTE ADULT - PROBLEM SELECTOR PLAN 1
Pt p/w witnessed mechanical fall but no LOC or head strike, AOx0 on admission, BP P 131/85-->223/95-->149/74, FSG >600. Stroke code called in the ED, NIHSS 0, CTH without acute pathology of stroke but notable for brain mets. No leukocytosis, UA with 8 WBC and many bacteria but pt asymptomatic. s/p CTX 1g. On later evaluation, AOx3, pt recalls her fall and claims that she tripped. Neurological exam significant for shuffling and narrow gait, cogwheeling, slow speech, and resting tremors in bilateral hands and chin.   Per chart review, TTE in 2023 noted normal LV size and systolic function, moderate LVH. MRI in June 2024 significant for 2 new brain metastases.   Of note, pt's compliance with home BP meds and insulin is unclear. Currently unknown where or how patient has been getting her medications (spoke with daughter and called multiple pharmacies, but none have recently dispensed her any medications).   Ddx of fall/encephalopathy: hypertensive emergency, hyperglycemia, brain metastases, seizure, untreated Parkinson's. Lower suspicion for infection or cardiac syncope.     Plan:  - f/u vEEG  - Consider neurology consult in AM  - Daily BMP  - f/u UCx  - PT/OT to evaluate after BLE Duplex  - palliative consulted, appreciate assistance - pt full care/full code Pt p/w witnessed mechanical fall but no LOC or head strike, AOx0 on admission, BP P 131/85-->223/95-->149/74, FSG >600. Stroke code called in the ED, NIHSS 0, CTH without acute pathology of stroke but notable for brain mets. No leukocytosis, UA with 8 WBC and many bacteria but pt asymptomatic. s/p CTX 1g. On later evaluation, AOx3, pt recalls her fall and claims that she tripped. Neurological exam significant for shuffling and narrow gait, cogwheeling, slow speech, and resting tremors in bilateral hands and chin.   Per chart review, TTE in 2023 noted normal LV size and systolic function, moderate LVH. MRI in June 2024 significant for 2 new brain metastases.   Of note, pt's compliance with home BP meds and insulin is unclear. Currently unknown where or how patient has been getting her medications (spoke with daughter and called multiple pharmacies, but none have recently dispensed her any medications).   Ddx of fall/encephalopathy: hypertensive emergency, hyperglycemia, brain metastases, seizure, untreated Parkinson's. Lower suspicion for infection or cardiac syncope.     Plan:  - f/u vEEG results  - Consult neurology for evaluation of potential parkinson's  - Consult neurology for safety of AC in setting of brain metastases  - Daily BMP  - Hold PT/OT evaluation until neuro consult for AC   - palliative consulted, appreciate assistance - pt full care/full code Pt p/w witnessed mechanical fall but no LOC or head strike, AOx0 on admission, BP P 131/85-->223/95-->149/74, FSG >600. Stroke code called in the ED, NIHSS 0, CTH without acute pathology of stroke but notable for brain mets. No leukocytosis, UA with 8 WBC and many bacteria but pt asymptomatic. s/p CTX 1g. On later evaluation, AOx3, pt recalls her fall and claims that she tripped. Neurological exam significant for shuffling and narrow gait, cogwheeling, slow speech, and resting tremors in bilateral hands and chin.   Per chart review, TTE in 2023 noted normal LV size and systolic function, moderate LVH. MRI in June 2024 significant for 2 new brain metastases.   Of note, pt's compliance with home BP meds and insulin is unclear. Currently unknown where or how patient has been getting her medications (spoke with daughter and called multiple pharmacies, but none have recently dispensed her any medications).   Ddx of fall/encephalopathy: hypertensive emergency, hyperglycemia, brain metastases, seizure, untreated Parkinson's. Lower suspicion for infection or cardiac syncope.     Plan:  - f/u vEEG results  - Neurology consulted for evaluation of potential parkinson's  - Consult neurosurgery for safety of AC in setting of brain metastases  - Daily BMP  - Hold PT/OT evaluation until neuro consult for AC   - Palliative consulted, appreciate assistance - pt full care/full code Pt p/w witnessed mechanical fall but no LOC or head strike, AOx0 on admission, BP P 131/85-->223/95-->149/74, FSG >600. Stroke code called in the ED, NIHSS 0, CTH without acute pathology of stroke but notable for brain mets. No leukocytosis, UA with 8 WBC and many bacteria but pt asymptomatic. s/p CTX 1g. On later evaluation, AOx3, pt recalls her fall and claims that she tripped. Neurological exam significant for shuffling and narrow gait, cogwheeling, slow speech, and resting tremors in bilateral hands and chin. vEEG noted mild excess intermittent slow wave activity shifting between hemispheres but no seizure activity.  Per chart review, TTE in 2023 noted normal LV size and systolic function, moderate LVH. MRI in June 2024 significant for 2 new brain metastases.   Of note, pt's compliance with home BP meds and insulin is unclear. Currently unknown where or how patient has been getting her medications (spoke with daughter and called multiple pharmacies, but none have recently dispensed her any medications).   Ddx of fall/encephalopathy: hypertensive emergency, hyperglycemia, brain metastases, seizure, untreated Parkinson's. Lower suspicion for infection or cardiac syncope.     Plan:  - f/u Neurology consult for evaluation of potential parkinson's, appreciate recs  - Consult neurosurgery for safety of AC in setting of brain metastases  - Daily BMP  - Hold PT/OT evaluation until neuro consult for AC   - Palliative consulted, appreciate assistance - pt full care/full code

## 2024-07-31 NOTE — PROGRESS NOTE ADULT - PROBLEM SELECTOR PLAN 10
Fluids: none  Electrolytes: Mg >2, K >4  Nutrition: consistent carb  Prophylaxis: lovenox  Activity: PT/OT eval  GI: none  C: FC/FC  Dispo: F Home meds based on surescripts: Hydroxychloroquine Sulfate 200 MG BID, sulfasalazine 500mg BID,    Plan:    - holding home meds

## 2024-07-31 NOTE — PROGRESS NOTE ADULT - ASSESSMENT
Pt  is a 75 yo F w/ PMH of T2DM, NSCLC adenocarcinoma with mets to brain (Dx 2023), s/p XRT currently on weekly chemo, R hip replacement, RA, CKD IIIA, HTN and HLD who presents after witnessed mechanical fall, admitted for AMS, hypertensive urgency, and hyperglycemia (FSG>600).

## 2024-07-31 NOTE — PROGRESS NOTE ADULT - PROBLEM SELECTOR PLAN 6
Resolved.  Baseline Cr ~1.2. On admission Cr 1.38-->1.2 s/p 2L NS. likely iso hypertensive emergency. Follows with Dr. Delaney at [facility]. Pt is receiving both chemo and radiation therapy. Last session for chemo 06/2024.    Plan:    - Consult rad/onc   - f/u heme/onc recs - no plans for chemotherapy while inpatient  - Zofran PRN for nausea Hypertensive urgency resolved (admission /95, EKG NSR with PVCs, S/p labetalol 20mg x2, nifedipine 30mg x1).  Home meds based on HIEE and surescripts: Amlodipine 10mg qd, Metoprolol Succinate ER 100mg qd, nifedipine ER 60mg qd    Plan:  - c/w metoprolol 100mg qd  - c/w nifedipine ER 60mg qd  - hold amlodipine

## 2024-07-31 NOTE — PROGRESS NOTE ADULT - PROBLEM SELECTOR PLAN 8
Home meds: Atorvastatin Calcium 40 MG Oral at night  - c/w home atorva Pt endorses compliance with insulin regimen but in ED blood glucose >700, BHB 0.5. Per chart review, pt has had ED visits in the past with hyperglycemia. Low concern for DKA as ABG w/o acidosis or anion gap. s/p 15U insulin in the ED and FSG improved to 289. Pt is asymptomatic.     Plan:    - Plan as above

## 2024-07-31 NOTE — PROGRESS NOTE ADULT - PROBLEM SELECTOR PLAN 7
Resolved.  Pt endorses compliance with insulin regimen but in ED blood glucose >700, BHB 0.5. Per chart review, pt has had ED visits in the past with hyperglycemia. Low concern for DKA as ABG w/o acidosis or anion gap. s/p 15U insulin in the ED and FSG improved to 289. Pt is asymptomatic.     - plan as above Resolved.  Baseline Cr ~1.2. On admission Cr 1.38-->1.2 s/p 2L NS. likely iso hypertensive emergency. Follows with Dr. Delaney at [facility]. Pt is receiving both chemo and radiation therapy. Last session for chemo 06/2024. Pt taken for radiation therapy on 7/31.    Plan:  - Rad/onc consulted, appreciate recs  - Heme/onc consulted, appreciate recs: no plans for chemotherapy while inpatient  - Zofran PRN for nausea

## 2024-07-31 NOTE — DISCHARGE NOTE NURSING/CASE MANAGEMENT/SOCIAL WORK - PATIENT PORTAL LINK FT
You can access the FollowMyHealth Patient Portal offered by [facility] by registering at the following website: [email or web address]. By joining [facility] FollowMyHealth portal, you will also be able to view your health information using other applications (apps) compatible with our system.

## 2024-07-31 NOTE — CONSULT NOTE ADULT - SUBJECTIVE AND OBJECTIVE BOX
---------INCOMPLETE Neurology Consult----------    Patient is a 74y old Female who presents with a chief complaint of AMS and fall (31 Jul 2024 06:04).     Patient has PMH of T2DM, NSCLC adenocarcinoma with mets to brain (Dx 2023, new mets noted on 06/2024 MRI, noted in outpt NSGY note), s/p XRT, on chemo, R hip replacement, RA, CKD IIIA, HTN and HLD p/w mechanical fall and AMS and found to have hypertensive urgency and hyperglycemia (>600), now resolved. Patient noted by primary team to have narrow & shuffling gait, cogwheel rigidity, MMSE 12. Neurology was consulted for concern of Parkinson's disease. She's planned for d/c to [facility].      HPI:  Pt  is a 75 yo F w/ PMH of T2DM, NSCLC adenocarcinoma with mets to brain (Dx 2023), s/p XRT, on chemo, R hip replacement, RA, CKD IIIA, HTN and HLD presenting for witnessed mechanical [facility] Pt does not recall the events leading up to her hospitalization. Per ED note, pt was altered initially and unable to give history, name, date. Pt does report having multiple falls in the past and but still cannot recall what happened. Pt denies all ROS.    In the ED, pt was a stroke code but pt upon neurological examination, pt was AOx3 with no focal neurological deficits does have narrow, shuffling gate.    Vitals: 98.5, -->81 /85-->223/95-->149/74 RR18 SpO2 96%  labs: CBC wnl, FSG >600, troponin 63, Na131, BUN 30 Cr 1.38 glucose 716 BHB 0.5  UA: trace ketones, 8 WBC, numerous bacteria,   CTH: neg for acute process, brain mets  CTA H/N: mild stenosis fo the distal L M1 segment  EKG: NSR with PVCs, QTc 441  Intervention: CTX 1g, labetalol 20mg x2, nifedipine 30mg x1, Kcl 40mg x1  Consults: NSGY, neurology - stroke code (29 Jul 2024 22:58)      PAST MEDICAL & SURGICAL HISTORY:  HTN (hypertension)  HLD (hyperlipidemia)  DM (diabetes mellitus), type 2  Rheumatoid arthritis  Stage 4 chronic kidney disease  Degenerative joint disease (DJD) of lumbar spine  Osteopenia  Lung cancer metastatic to brain  S/P total right hip arthroplasty      FAMILY HISTORY:  No pertinent family history in first degree relatives    Social History: (-) x 3    Allergies    citrus (Urticaria)  Bactrim (Rash)    Intolerances        MEDICATIONS  (STANDING):  atorvastatin 40 milliGRAM(s) Oral at bedtime  cefTRIAXone   IVPB 1000 milliGRAM(s) IV Intermittent every 24 hours  dextrose 5%. 1000 milliLiter(s) (50 mL/Hr) IV Continuous <Continuous>  dextrose 5%. 1000 milliLiter(s) (100 mL/Hr) IV Continuous <Continuous>  dextrose 50% Injectable 12.5 Gram(s) IV Push once  dextrose 50% Injectable 25 Gram(s) IV Push once  dextrose 50% Injectable 25 Gram(s) IV Push once  enoxaparin Injectable 60 milliGRAM(s) SubCutaneous every 12 hours  glucagon  Injectable 1 milliGRAM(s) IntraMuscular once  insulin glargine Injectable (LANTUS) 17 Unit(s) SubCutaneous at bedtime  insulin lispro (ADMELOG) corrective regimen sliding scale   SubCutaneous Before meals and at bedtime  insulin lispro Injectable (ADMELOG) 11 Unit(s) SubCutaneous three times a day before meals  metoprolol succinate  milliGRAM(s) Oral every 24 hours  NIFEdipine XL 60 milliGRAM(s) Oral every 24 hours  nystatin Cream 1 Application(s) Topical every 12 hours    MEDICATIONS  (PRN):  dextrose Oral Gel 15 Gram(s) Oral once PRN Blood Glucose LESS THAN 70 milliGRAM(s)/deciliter      Review of systems:    Constitutional: as per HPI  Eyes: No eye pain or discharge  ENMT:  No difficulty hearing; No sinus or throat pain  Neck: No pain or stiffness  Respiratory: No cough, wheezing, chills or hemoptysis  Cardiovascular: No chest pain, palpitations, shortness of breath, dyspnea on exertion  Gastrointestinal: No abdominal pain, nausea, vomiting or hematemesis; No diarrhea or constipation.   Genitourinary: No dysuria, frequency, hematuria or incontinence  Neurological: As per HPI  Skin: No rashes or lesions   Endocrine: No heat or cold intolerance; No hair loss  Musculoskeletal: No joint pain or swelling  Psychiatric: No depression, anxiety, mood swings  Heme/Lymph: No easy bruising or bleeding gums    Vital Signs Last 24 Hrs  T(C): 36.5 (31 Jul 2024 12:15), Max: 37.3 (30 Jul 2024 22:30)  T(F): 97.7 (31 Jul 2024 12:15), Max: 99.2 (30 Jul 2024 22:30)  HR: 66 (31 Jul 2024 12:15) (66 - 99)  BP: 115/67 (31 Jul 2024 12:15) (115/67 - 134/72)  BP(mean): 83 (31 Jul 2024 12:15) (83 - 83)  RR: 19 (31 Jul 2024 12:15) (17 - 19)  SpO2: 95% (31 Jul 2024 12:15) (93% - 95%)    Parameters below as of 31 Jul 2024 12:15  Patient On (Oxygen Delivery Method): room air    Examination:  General:  Appearance is consistent with chronologic age.  No abnormal facies.  Gross skin survey within normal limits.    Cognitive/Language:  The patient is oriented to person, place, time and date. Remote memory intact. Recent memory is impaired. Fund of knowledge is intact and normal. Language with normal repetition, comprehension and naming, speech is slow. Nondysarthric.    Eyes: intact VA, VFF.  EOMI w/o nystagmus, skew or reported double vision.  PERRL.  No ptosis/weakness of eyelid closure.    Face:  Facial sensation normal V1 - 3, no facial asymmetry.    Ears/Nose/Throat:  Hearing grossly intact b/l. Palate elevates midline. Tongue and uvula midline.   Motor examination:  Normal tone, bulk and range of motion.  No tenderness, twitching, tremors or involuntary movements.  Formal Muscle Strength Testing: (MRC grade) 4+/5 b/l UE, 4+/5 RLE, 4/5 LLE, 3+/5 of R foot dorsiflexion  Reflexes: 2+ b/l pectoralis, biceps, triceps, brachioradialis, patella and Achilles. Plantar response upgoing on Right. Jaw jerk, Annette, clonus absent.  Sensory examination: decreased sensation to light touch and temperature and vibration in b/l LE and RUE, intact sensation in LUE.   Cerebellum: FTN/HKS intact with normal BRUNO in all limbs. No dysmetria. Mild dysdiadokinesia. Gait narrow based but slow with a walker.     Respiratory:  no audible wheezing or inspiratory stridor.  no use of accessory muscles.   Cardiac: pulse palpable, no audible bruits  Abdomen: supple, no guarding, no TTP    Labs:   CBC Full  -  ( 31 Jul 2024 05:30 )  WBC Count : 8.50 K/uL  RBC Count : 4.44 M/uL  Hemoglobin : 12.1 g/dL  Hematocrit : 38.2 %  Platelet Count - Automated : 177 K/uL  Mean Cell Volume : 86.0 fl  Mean Cell Hemoglobin : 27.3 pg  Mean Cell Hemoglobin Concentration : 31.7 gm/dL  Auto Neutrophil # : 5.16 K/uL  Auto Lymphocyte # : 1.52 K/uL  Auto Monocyte # : 0.88 K/uL  Auto Eosinophil # : 0.09 K/uL  Auto Basophil # : 0.16 K/uL  Auto Neutrophil % : 60.6 %  Auto Lymphocyte % : 17.9 %  Auto Monocyte % : 10.4 %  Auto Eosinophil % : 1.1 %  Auto Basophil % : 1.9 %    07-31    133<L>  |  102  |  26<H>  ----------------------------<  195<H>  4.0   |  23  |  1.48<H>    Ca    8.4      31 Jul 2024 05:30  Phos  2.5     07-31  Mg     1.5     07-31    TPro  6.2  /  Alb  3.2<L>  /  TBili  0.3  /  DBili  x   /  AST  21  /  ALT  13  /  AlkPhos  106  07-30    LIVER FUNCTIONS - ( 30 Jul 2024 05:30 )  Alb: 3.2 g/dL / Pro: 6.2 g/dL / ALK PHOS: 106 U/L / ALT: 13 U/L / AST: 21 U/L / GGT: x           PT/INR - ( 29 Jul 2024 13:51 )   PT: 11.0 sec;   INR: 0.96          PTT - ( 29 Jul 2024 13:51 )  PTT:25.4 sec  Urinalysis Basic - ( 31 Jul 2024 05:30 )    Color: x / Appearance: x / SG: x / pH: x  Gluc: 195 mg/dL / Ketone: x  / Bili: x / Urobili: x   Blood: x / Protein: x / Nitrite: x   Leuk Esterase: x / RBC: x / WBC x   Sq Epi: x / Non Sq Epi: x / Bacteria: x          Neuroimaging:  [facility]:     07-31-24 @ 12:39       ---------INCOMPLETE Neurology Consult----------    Patient is a 74y old Female who presents with a chief complaint of AMS and fall (31 Jul 2024 06:04).     Patient has PMH of T2DM, NSCLC adenocarcinoma with mets to brain (Dx 2023, new mets noted on 06/2024 MRI, noted in outpt NSGY note), s/p XRT, on chemo, R hip replacement, RA, CKD IIIA, HTN and HLD p/w mechanical fall and AMS and found to have hypertensive urgency and hyperglycemia (>600), now resolved. Patient noted by primary team to have narrow & shuffling gait, cogwheel rigidity, MMSE 12. Neurology was consulted for concern of Parkinson's disease. She's planned for d/c to [facility]. Patient was seen when working with PT, able to stand up with minimal assistance and walk with a walker. Patient is a poor historian, stating she has tremors and left-sided weakness for over a year.       HPI:  Pt  is a 73 yo F w/ PMH of T2DM, NSCLC adenocarcinoma with mets to brain (Dx 2023), s/p XRT, on chemo, R hip replacement, RA, CKD IIIA, HTN and HLD presenting for witnessed mechanical fall while waiting for elevator at [address] [place] [address]. Pt does not recall the events leading up to her hospitalization. Per ED note, pt was altered initially and unable to give history, name, date. Pt does report having multiple falls in the past and but still cannot recall what happened. Pt denies all ROS.    In the ED, pt was a stroke code but pt upon neurological examination, pt was AOx3 with no focal neurological deficits does have narrow, shuffling gate.    Vitals: 98.5, -->81 /85-->223/95-->149/74 RR18 SpO2 96%  labs: CBC wnl, FSG >600, troponin 63, Na131, BUN 30 Cr 1.38 glucose 716 BHB 0.5  UA: trace ketones, 8 WBC, numerous bacteria,   CTH: neg for acute process, brain mets  CTA H/N: mild stenosis fo the distal L M1 segment  EKG: NSR with PVCs, QTc 441  Intervention: CTX 1g, labetalol 20mg x2, nifedipine 30mg x1, Kcl 40mg x1  Consults: NSGY, neurology - stroke code (29 Jul 2024 22:58)      PAST MEDICAL & SURGICAL HISTORY:  HTN (hypertension)  HLD (hyperlipidemia)  DM (diabetes mellitus), type 2  Rheumatoid arthritis  Stage 4 chronic kidney disease  Degenerative joint disease (DJD) of lumbar spine  Osteopenia  Lung cancer metastatic to brain  S/P total right hip arthroplasty      FAMILY HISTORY:  No pertinent family history in first degree relatives    Social History: (-) x 3    Allergies    citrus (Urticaria)  Bactrim (Rash)    Intolerances        MEDICATIONS  (STANDING):  atorvastatin 40 milliGRAM(s) Oral at bedtime  cefTRIAXone   IVPB 1000 milliGRAM(s) IV Intermittent every 24 hours  dextrose 5%. 1000 milliLiter(s) (50 mL/Hr) IV Continuous <Continuous>  dextrose 5%. 1000 milliLiter(s) (100 mL/Hr) IV Continuous <Continuous>  dextrose 50% Injectable 12.5 Gram(s) IV Push once  dextrose 50% Injectable 25 Gram(s) IV Push once  dextrose 50% Injectable 25 Gram(s) IV Push once  enoxaparin Injectable 60 milliGRAM(s) SubCutaneous every 12 hours  glucagon  Injectable 1 milliGRAM(s) IntraMuscular once  insulin glargine Injectable (LANTUS) 17 Unit(s) SubCutaneous at bedtime  insulin lispro (ADMELOG) corrective regimen sliding scale   SubCutaneous Before meals and at bedtime  insulin lispro Injectable (ADMELOG) 11 Unit(s) SubCutaneous three times a day before meals  metoprolol succinate  milliGRAM(s) Oral every 24 hours  NIFEdipine XL 60 milliGRAM(s) Oral every 24 hours  nystatin Cream 1 Application(s) Topical every 12 hours    MEDICATIONS  (PRN):  dextrose Oral Gel 15 Gram(s) Oral once PRN Blood Glucose LESS THAN 70 milliGRAM(s)/deciliter      Review of systems:    Constitutional: as per HPI  Eyes: No eye pain or discharge  ENMT:  No difficulty hearing; No sinus or throat pain  Neck: No pain or stiffness  Respiratory: No cough, wheezing, chills or hemoptysis  Cardiovascular: No chest pain, palpitations, shortness of breath, dyspnea on exertion  Gastrointestinal: No abdominal pain, nausea, vomiting or hematemesis; No diarrhea or constipation.   Genitourinary: No dysuria, frequency, hematuria or incontinence  Neurological: As per HPI  Skin: No rashes or lesions   Endocrine: No heat or cold intolerance; No hair loss  Musculoskeletal: No joint pain or swelling  Psychiatric: No depression, anxiety, mood swings  Heme/Lymph: No easy bruising or bleeding gums    Vital Signs Last 24 Hrs  T(C): 36.5 (31 Jul 2024 12:15), Max: 37.3 (30 Jul 2024 22:30)  T(F): 97.7 (31 Jul 2024 12:15), Max: 99.2 (30 Jul 2024 22:30)  HR: 66 (31 Jul 2024 12:15) (66 - 99)  BP: 115/67 (31 Jul 2024 12:15) (115/67 - 134/72)  BP(mean): 83 (31 Jul 2024 12:15) (83 - 83)  RR: 19 (31 Jul 2024 12:15) (17 - 19)  SpO2: 95% (31 Jul 2024 12:15) (93% - 95%)    Parameters below as of 31 Jul 2024 12:15  Patient On (Oxygen Delivery Method): room air    Examination:  General:  Appearance is consistent with chronologic age.  No abnormal facies.  Gross skin survey within normal limits.    Cognitive/Language:  The patient is oriented to person, place, time and date. Remote memory intact. Recent memory is impaired. Fund of knowledge is intact and normal. Language with normal repetition, comprehension and naming, speech is slow. Nondysarthric.    Eyes: intact VA, VFF.  EOMI w/o nystagmus, skew or reported double vision.  PERRL.  No ptosis/weakness of eyelid closure.    Face:  Facial sensation normal V1 - 3, no facial asymmetry.    Ears/Nose/Throat:  Hearing grossly intact b/l. Palate elevates midline. Tongue and uvula midline.   Motor examination:  Normal tone, bulk and range of motion.  No tenderness, twitching, tremors or involuntary movements.  Formal Muscle Strength Testing: (MRC grade) 4+/5 b/l UE, 4+/5 RLE, 4/5 LLE, 3+/5 of R foot dorsiflexion  Reflexes: 2+ b/l pectoralis, biceps, triceps, brachioradialis, patella and Achilles. Plantar response upgoing on Right. Jaw jerk, Annette, clonus absent.  Sensory examination: decreased sensation to light touch and temperature and vibration in b/l LE and RUE, intact sensation in LUE.   Cerebellum: FTN/HKS intact with normal BRUNO in all limbs. No dysmetria. Mild dysdiadokinesia. Gait narrow based but slow with a walker.     Respiratory:  no audible wheezing or inspiratory stridor.  no use of accessory muscles.   Cardiac: pulse palpable, no audible bruits  Abdomen: supple, no guarding, no TTP    Labs:   CBC Full  -  ( 31 Jul 2024 05:30 )  WBC Count : 8.50 K/uL  RBC Count : 4.44 M/uL  Hemoglobin : 12.1 g/dL  Hematocrit : 38.2 %  Platelet Count - Automated : 177 K/uL  Mean Cell Volume : 86.0 fl  Mean Cell Hemoglobin : 27.3 pg  Mean Cell Hemoglobin Concentration : 31.7 gm/dL  Auto Neutrophil # : 5.16 K/uL  Auto Lymphocyte # : 1.52 K/uL  Auto Monocyte # : 0.88 K/uL  Auto Eosinophil # : 0.09 K/uL  Auto Basophil # : 0.16 K/uL  Auto Neutrophil % : 60.6 %  Auto Lymphocyte % : 17.9 %  Auto Monocyte % : 10.4 %  Auto Eosinophil % : 1.1 %  Auto Basophil % : 1.9 %    07-31    133<L>  |  102  |  26<H>  ----------------------------<  195<H>  4.0   |  23  |  1.48<H>    Ca    8.4      31 Jul 2024 05:30  Phos  2.5     07-31  Mg     1.5     07-31    TPro  6.2  /  Alb  3.2<L>  /  TBili  0.3  /  DBili  x   /  AST  21  /  ALT  13  /  AlkPhos  106  07-30    LIVER FUNCTIONS - ( 30 Jul 2024 05:30 )  Alb: 3.2 g/dL / Pro: 6.2 g/dL / ALK PHOS: 106 U/L / ALT: 13 U/L / AST: 21 U/L / GGT: x           PT/INR - ( 29 Jul 2024 13:51 )   PT: 11.0 sec;   INR: 0.96          PTT - ( 29 Jul 2024 13:51 )  PTT:25.4 sec  Urinalysis Basic - ( 31 Jul 2024 05:30 )    Color: x / Appearance: x / SG: x / pH: x  Gluc: 195 mg/dL / Ketone: x  / Bili: x / Urobili: x   Blood: x / Protein: x / Nitrite: x   Leuk Esterase: x / RBC: x / WBC x   Sq Epi: x / Non Sq Epi: x / Bacteria: x          Neuroimaging:  [facility]:     07-31-24 @ 12:39       Patient is a 74y old Female who presents with a chief complaint of AMS and fall. Patient has PMH of T2DM, NSCLC adenocarcinoma with mets to brain (Dx 2023, new mets noted on 06/2024 MRI, noted in outpt NSGY note), s/p XRT, on chemo, R hip replacement, RA, CKD IIIA, HTN and HLD p/w mechanical fall and AMS and found to have hypertensive urgency and hyperglycemia (>600), now resolved. Patient noted by primary team to have narrow & shuffling gait, cogwheel rigidity, MMSE 12. Neurology was consulted for concern of Parkinson's disease. She's planned for d/c to [facility]. Patient was seen when working with PT, able to stand up with minimal assistance and walk with a walker. Patient is a poor historian, stating she has tremors and left-sided weakness for over a year.       HPI:  Pt  is a 75 yo F w/ PMH of T2DM, NSCLC adenocarcinoma with mets to brain (Dx 2023), s/p XRT, on chemo, R hip replacement, RA, CKD IIIA, HTN and HLD presenting for witnessed mechanical fall while waiting for elevator at [address] [place] [address]. Pt does not recall the events leading up to her hospitalization. Per ED note, pt was altered initially and unable to give history, name, date. Pt does report having multiple falls in the past and but still cannot recall what happened. Pt denies all ROS.    In the ED, pt was a stroke code but pt upon neurological examination, pt was AOx3 with no focal neurological deficits does have narrow, shuffling gate.    Vitals: 98.5, -->81 /85-->223/95-->149/74 RR18 SpO2 96%  labs: CBC wnl, FSG >600, troponin 63, Na131, BUN 30 Cr 1.38 glucose 716 BHB 0.5  UA: trace ketones, 8 WBC, numerous bacteria,   CTH: neg for acute process, brain mets  CTA H/N: mild stenosis fo the distal L M1 segment  EKG: NSR with PVCs, QTc 441  Intervention: CTX 1g, labetalol 20mg x2, nifedipine 30mg x1, Kcl 40mg x1  Consults: NSGY, neurology - stroke code (29 Jul 2024 22:58)      PAST MEDICAL & SURGICAL HISTORY:  HTN (hypertension)  HLD (hyperlipidemia)  DM (diabetes mellitus), type 2  Rheumatoid arthritis  Stage 4 chronic kidney disease  Degenerative joint disease (DJD) of lumbar spine  Osteopenia  Lung cancer metastatic to brain  S/P total right hip arthroplasty      FAMILY HISTORY:  No pertinent family history in first degree relatives    Social History: (-) x 3    Allergies    citrus (Urticaria)  Bactrim (Rash)    Intolerances        MEDICATIONS  (STANDING):  atorvastatin 40 milliGRAM(s) Oral at bedtime  cefTRIAXone   IVPB 1000 milliGRAM(s) IV Intermittent every 24 hours  dextrose 5%. 1000 milliLiter(s) (50 mL/Hr) IV Continuous <Continuous>  dextrose 5%. 1000 milliLiter(s) (100 mL/Hr) IV Continuous <Continuous>  dextrose 50% Injectable 12.5 Gram(s) IV Push once  dextrose 50% Injectable 25 Gram(s) IV Push once  dextrose 50% Injectable 25 Gram(s) IV Push once  enoxaparin Injectable 60 milliGRAM(s) SubCutaneous every 12 hours  glucagon  Injectable 1 milliGRAM(s) IntraMuscular once  insulin glargine Injectable (LANTUS) 17 Unit(s) SubCutaneous at bedtime  insulin lispro (ADMELOG) corrective regimen sliding scale   SubCutaneous Before meals and at bedtime  insulin lispro Injectable (ADMELOG) 11 Unit(s) SubCutaneous three times a day before meals  metoprolol succinate  milliGRAM(s) Oral every 24 hours  NIFEdipine XL 60 milliGRAM(s) Oral every 24 hours  nystatin Cream 1 Application(s) Topical every 12 hours    MEDICATIONS  (PRN):  dextrose Oral Gel 15 Gram(s) Oral once PRN Blood Glucose LESS THAN 70 milliGRAM(s)/deciliter      Review of systems:    Constitutional: as per HPI  Eyes: No eye pain or discharge  ENMT:  No difficulty hearing; No sinus or throat pain  Neck: No pain or stiffness  Respiratory: No cough, wheezing, chills or hemoptysis  Cardiovascular: No chest pain, palpitations, shortness of breath, dyspnea on exertion  Gastrointestinal: No abdominal pain, nausea, vomiting or hematemesis; No diarrhea or constipation.   Genitourinary: No dysuria, frequency, hematuria or incontinence  Neurological: As per HPI  Skin: No rashes or lesions   Endocrine: No heat or cold intolerance; No hair loss  Musculoskeletal: No joint pain or swelling  Psychiatric: No depression, anxiety, mood swings  Heme/Lymph: No easy bruising or bleeding gums    Vital Signs Last 24 Hrs  T(C): 36.5 (31 Jul 2024 12:15), Max: 37.3 (30 Jul 2024 22:30)  T(F): 97.7 (31 Jul 2024 12:15), Max: 99.2 (30 Jul 2024 22:30)  HR: 66 (31 Jul 2024 12:15) (66 - 99)  BP: 115/67 (31 Jul 2024 12:15) (115/67 - 134/72)  BP(mean): 83 (31 Jul 2024 12:15) (83 - 83)  RR: 19 (31 Jul 2024 12:15) (17 - 19)  SpO2: 95% (31 Jul 2024 12:15) (93% - 95%)    Parameters below as of 31 Jul 2024 12:15  Patient On (Oxygen Delivery Method): room air    Examination:  General:  Appearance is consistent with chronologic age.  No abnormal facies.  Gross skin survey within normal limits.    Cognitive/Language:  The patient is oriented to person, place, time and date. Remote memory intact. Recent memory is impaired. Fund of knowledge is intact and normal. Language with normal repetition, comprehension and naming, speech is slow. Nondysarthric.    Eyes: intact VA, VFF.  EOMI w/o nystagmus, skew or reported double vision.  PERRL.  No ptosis/weakness of eyelid closure.    Face:  Facial sensation normal V1 - 3, no facial asymmetry.    Ears/Nose/Throat:  Hearing grossly intact b/l. Palate elevates midline. Tongue and uvula midline.   Motor examination:  Normal tone, bulk and range of motion.  No tenderness, twitching, tremors or involuntary movements.  Formal Muscle Strength Testing: (MRC grade) 4+/5 b/l UE, 4+/5 RLE, 4/5 LLE, 3+/5 of R foot dorsiflexion  Reflexes: 2+ b/l pectoralis, biceps, triceps, brachioradialis, patella and Achilles. Plantar response upgoing on Right. Jaw jerk, Annette, clonus absent.  Sensory examination: decreased sensation to light touch and temperature and vibration in b/l LE and RUE, intact sensation in LUE.   Cerebellum: FTN/HKS intact with normal BRUNO in all limbs. No dysmetria. Mild dysdiadokinesia. Gait narrow based but slow with a walker.     Respiratory:  no audible wheezing or inspiratory stridor.  no use of accessory muscles.   Cardiac: pulse palpable, no audible bruits  Abdomen: supple, no guarding, no TTP    Labs:   CBC Full  -  ( 31 Jul 2024 05:30 )  WBC Count : 8.50 K/uL  RBC Count : 4.44 M/uL  Hemoglobin : 12.1 g/dL  Hematocrit : 38.2 %  Platelet Count - Automated : 177 K/uL  Mean Cell Volume : 86.0 fl  Mean Cell Hemoglobin : 27.3 pg  Mean Cell Hemoglobin Concentration : 31.7 gm/dL  Auto Neutrophil # : 5.16 K/uL  Auto Lymphocyte # : 1.52 K/uL  Auto Monocyte # : 0.88 K/uL  Auto Eosinophil # : 0.09 K/uL  Auto Basophil # : 0.16 K/uL  Auto Neutrophil % : 60.6 %  Auto Lymphocyte % : 17.9 %  Auto Monocyte % : 10.4 %  Auto Eosinophil % : 1.1 %  Auto Basophil % : 1.9 %    07-31    133<L>  |  102  |  26<H>  ----------------------------<  195<H>  4.0   |  23  |  1.48<H>    Ca    8.4      31 Jul 2024 05:30  Phos  2.5     07-31  Mg     1.5     07-31    TPro  6.2  /  Alb  3.2<L>  /  TBili  0.3  /  DBili  x   /  AST  21  /  ALT  13  /  AlkPhos  106  07-30    LIVER FUNCTIONS - ( 30 Jul 2024 05:30 )  Alb: 3.2 g/dL / Pro: 6.2 g/dL / ALK PHOS: 106 U/L / ALT: 13 U/L / AST: 21 U/L / GGT: x           PT/INR - ( 29 Jul 2024 13:51 )   PT: 11.0 sec;   INR: 0.96          PTT - ( 29 Jul 2024 13:51 )  PTT:25.4 sec  Urinalysis Basic - ( 31 Jul 2024 05:30 )    Color: x / Appearance: x / SG: x / pH: x  Gluc: 195 mg/dL / Ketone: x  / Bili: x / Urobili: x   Blood: x / Protein: x / Nitrite: x   Leuk Esterase: x / RBC: x / WBC x   Sq Epi: x / Non Sq Epi: x / Bacteria: x          Neuroimaging:  [facility]:     07-31-24 @ 12:39

## 2024-07-31 NOTE — EEG REPORT - NS EEG TEXT BOX
[facility] Department of Neurology  Inpatient Continuous video-Electroencephalogram  [address], [address] [place] [id number], T: [phone]    Patient Name:	MICHAEL READ    :	1950  MRN:	[id number]    Study Start Date/Time:	2024, 10:13:04 AM  Study End Date/Time:    Referred by:  Dr. Trisha Saucedo    Brief Clinical History:  MICHAEL READ is a 74 year old Female with fall and known lung cancer with brain metastasis, bilateral temporal and gliosis in the right occipital lobe; study performed to investigate for seizures or markers of epilepsy.   Technologist notes: -  Diagnosis Code:  R56.9 convulsions/seizure    Pertinent Medication:  n/a    Acquisition Details:  Electroencephalography was acquired using a minimum of 21 channels on an [employer] system v 9.3.1 with electrode placement according to the standard International 10-20 system following ACNS (American Clinical Neurophysiology Society) guidelines.  Anterior temporal T1 and T2 electrodes were utilized whenever possible.  The XLTEK automated spike & seizure detections were all reviewed in detail, in addition to the entire raw EEG.    Findings:  Day 1:  2024, 10:13:04 AM to next morning at 07:00 AM   Background:  continuous, with predominantly alpha and beta frequencies.  Generalized Slowing:  None  Symmetry/Focality: Occasional (1-9%)  polymorphic delta shifting between hemispheres.  Voltage:  Normal (20+ uV)  Organization:  Appropriate anterior-posterior gradient  Posterior Dominant Rhythm:  9 Hz symmetric, well-organized, and well-modulated  Sleep:  Symmetric, synchronous spindles and K complexes.  Variability:   Yes		Reactivity:  Yes    Spontaneous Activity:  No epileptiform discharges   Events:  •	No electrographic seizures or significant clinical events occurred during this study.  Provocations:  •	Hyperventilation: was not performed.  •	Photic stimulation: was not performed.  Daily Summary:    •	There was mild excess intermittent slow wave activity shifting between hemispheres.  •	There were no findings of active epilepsy, however this alone does not rule out the diagnosis.       Brendon Camacho MD  Attending Neurologist, [facility] [place] [facility] Epilepsy [facility]

## 2024-07-31 NOTE — PHYSICAL THERAPY INITIAL EVALUATION ADULT - PATIENT/FAMILY/SIGNIFICANT OTHER GOALS STATEMENT, PT EVAL
Last refill #60 on 4/16/2020  Last office visit on 1/2/2020  No future appointments. to return to prior level of function

## 2024-07-31 NOTE — PROGRESS NOTE ADULT - PROBLEM SELECTOR PLAN 11
Fluids: none  Electrolytes: Mg >2, K >4  Nutrition: consistent carb  Prophylaxis: lovenox  Activity: PT/OT eval  GI: none  C: FC/FC  Dispo: F

## 2024-07-31 NOTE — PROGRESS NOTE ADULT - PROBLEM SELECTOR PLAN 9
Home meds based on surescripts: Hydroxychloroquine Sulfate 200 MG BID, sulfasalazine 500mg BID,    Plan:  - holding home meds Home meds: Atorvastatin Calcium 40 MG Oral at night    Plan:    - c/w home atorvastatin

## 2024-07-31 NOTE — CONSULT NOTE ADULT - ASSESSMENT
RECOMMENDATIONS:       74y old Female PMH of T2DM, NSCLC adenocarcinoma with mets to brain (Dx 2023, new mets noted on 06/2024 MRI, noted in outpt NSGY note), s/p XRT, on chemo, R hip replacement, RA, CKD IIIA, HTN and HLD p/w mechanical fall and AMS and found to have hypertensive urgency and hyperglycemia (>600). Neurology was consulted for concern of Parkinson's disease. On neurological exam, hand tremor is bilateral, at rest and with arm extension, without pill-rolling feature, and there is no associated rigidity or bradykinesia noticed; gait is unsteady and narrowed, but can be 2/2 chronic LLE weakness and right foot drop, which make Parkinsonism less likely. Patient also has hypoesthesia in bilateral LE and RUE, distal worse than proximal, concerning for diabetic neuropathy given history or poorly controlled glucose. Neurological findings localize to multiple CNS lesions, which most likely is 2/2 progressive brain mets.    RECOMMENDATIONS:  - no indication to initiate treatment for Parkinsonism.  - consider repeat MR head outpatient to evaluate progression of mets  - outpatient neuro-oncology follow up    The above assessment and plan has been discussed with General Neurology attending. Please reach out to us with any questions.   74y old Female PMH of T2DM, NSCLC adenocarcinoma with mets to brain (Dx 2023, new mets noted on 06/2024 MRI, noted in outpt NSGY note), s/p XRT, on chemo, R hip replacement, RA, CKD IIIA, HTN and HLD p/w mechanical fall and AMS and found to have hypertensive urgency and hyperglycemia (>600). Neurology was consulted for concern of Parkinson's disease. On neurological exam, hand tremor is bilateral, at rest and with arm extension, without pill-rolling feature, and there is no associated rigidity or bradykinesia noticed; gait is unsteady and narrowed, but can be 2/2 chronic LLE weakness and right foot drop, which make Parkinsonism less likely. Patient also has hypoesthesia in bilateral LE and RUE, distal worse than proximal, concerning for diabetic neuropathy given history or poorly controlled glucose. Neurological findings localize to multiple CNS lesions, which most likely is 2/2 progressive brain mets. Per chart review, Hem/Onc recommends re-starting chemotherapy in one week and not further oncological workup inpatient.    RECOMMENDATIONS:  - no indication to initiate treatment for Parkinsonism.  - consider repeat MR head and MR spine outpatient to evaluate progression of metastasis  - outpatient neuro-oncology follow up    The above assessment and plan has been discussed with General Neurology attending. Please reach out to us with any questions.

## 2024-07-31 NOTE — PROGRESS NOTE ADULT - PROBLEM SELECTOR PLAN 2
#HTN  Hypertensive urgency resolved (admission /95, EKG NSR with PVCs, S/p labetalol 20mg x2, nifedipine 30mg x1).  Home meds based on HIEE and surescripts: Amlodipine 10mg qd, Metoprolol Succinate ER 100mg qd, nifedipine ER 60mg qd    Plan:  - c/w metoprolol 100mg qd  - c/w nifedipine ER 60mg qd  - hold amlodipine Patient's urinalysis and urine cultures from ED admission grossly positive. Preliminary UC growing >120,000 gram negative rods. Patient is not symptomatic, afebrile and has a normal WBC.     Plan:    -Start short course of CTX 1g daily for 3d Baseline Cr ~1.2. On admission Cr 1.38, elevated likely 2/2 hypertensive emergency, Cr downtrended to 1.1 s/p 2L NS.   However, Cr elevated to 1.48 on 7/31, suspect IV contrast-induced nephropathy (iso CTA H/N on 7/29)    - 1L NS over 5 hrs  - CTM BMP

## 2024-07-31 NOTE — PROGRESS NOTE ADULT - PROBLEM SELECTOR PROBLEM 5
Unilateral edema of lower extremity Hypertensive urgency Deep vein thrombosis (DVT) of calf muscle vein of left lower extremity

## 2024-07-31 NOTE — CONSULT NOTE ADULT - NS ATTEST RISK PROBLEM GEN_ALL_CORE FT
Poorly controlled DM in pt who needs insulin but is unable to take it reliably
Abrupt Change In Neurological Status
NSCLC with brain mets  Hyperglycemia  Uncontrolled diabetes  Brain metastases

## 2024-07-31 NOTE — CONSULT NOTE ADULT - SUBJECTIVE AND OBJECTIVE BOX
HISTORY OF PRESENT ILLNESS  MICHAEL READ is a 74y Female with a past medical history of NSCLC adenocarcinoma c/b brain metastases (dx ) s/p XRT, on chemo, T2DM, RA, CKD IIIA, HTN, and HLD now presenting after a witnessed mechanical fall and altered mental status. Patient fell at [facility] while waiting for the elevator. She does not remember the preceding events and was initially confused. Stroke workup was negative. Patient reports incomplete adherence with home Insulin doses. She reports checking her fingerstick glucose levels upon awakening, before meals, and before bedtime. She denies recent polydipsia and polyuria. She additionally denies fevers/chills, chest pain, shortness of breath, abdominal pain, nausea/vomiting, urinary discomfort/frequency, and diarrhea.     CAPILLARY BLOOD GLUCOSE & INSULIN RECEIVED  253 mg/dL ( @ 09:47)  448 mg/dL ( @ 13:15)  449 mg/dL ( @ 14:24)  461 mg/dL ( @ 17:59)  453 mg/dL ( @ 18:00)  314 mg/dL ( @ 22:10)  247 mg/dL ( @ 08:44)  291 mg/dL ( @ 12:27)      DIABETES HISTORY  - Age at diagnosis: 58  - Symptoms at time of diagnosis: Falls  - Current Therapy: Lantus 18U, Novolog 9U  - History of other regimens: Previously on Lantus 15U, Farxiga, Januvia in   - History of hypoglycemia: Denies  - History of DKA/HHS: Denies  - Complications: ?Nephropathy (reports seeing a nephrologist in the past but cannot remember exactly)  - Home FSG:        > Fastin-250 mg/dL.        > Before meals: 200-250 mg/dL.        > Bedtime: 200-250 mg/dL.  - Diet:          > Breakfast: Oatmeal, coffee w/ stevia        > Lunch/Dinner: Pasta (1 cup cooked)  - Outpatient follow-up: Dr. Marissa Douglass (PCP), Dr. America Esparza (endocrinology, last seen 7/3/24)    PAST MEDICAL & SURGICAL HISTORY  As per history of present illness.     ALLERGIES  citrus (Urticaria)  Bactrim (Rash)    CURRENT MEDICATIONS  atorvastatin 40 milliGRAM(s) Oral at bedtime  dextrose 5%. 1000 milliLiter(s) IV Continuous <Continuous>  dextrose 5%. 1000 milliLiter(s) IV Continuous <Continuous>  dextrose 50% Injectable 12.5 Gram(s) IV Push once  dextrose 50% Injectable 25 Gram(s) IV Push once  dextrose 50% Injectable 25 Gram(s) IV Push once  dextrose Oral Gel 15 Gram(s) Oral once PRN  enoxaparin Injectable 60 milliGRAM(s) SubCutaneous every 24 hours  glucagon  Injectable 1 milliGRAM(s) IntraMuscular once  insulin glargine Injectable (LANTUS) 25 Unit(s) SubCutaneous at bedtime  insulin lispro (ADMELOG) corrective regimen sliding scale   SubCutaneous Before meals and at bedtime  insulin lispro Injectable (ADMELOG) 14 Unit(s) SubCutaneous three times a day before meals  metoprolol succinate  milliGRAM(s) Oral every 24 hours  NIFEdipine XL 60 milliGRAM(s) Oral every 24 hours  nystatin Cream 1 Application(s) Topical every 12 hours  sodium chloride 0.9%. 1000 milliLiter(s) IV Continuous <Continuous>    REVIEW OF SYSTEMS  Constitutional:  Negative fever, chills or loss of appetite.  Cardiovascular:  Negative for chest pain or palpitations.  Respiratory:  Negative for shortness of breath.   Gastrointestinal:  Negative for nausea, vomiting, diarrhea, constipation, or abdominal pain.  Genitourinary:  Negative frequency, urgency or dysuria.  Neurologic:  No dizziness, lightheadedness.    PHYSICAL EXAM  Vital Signs Last 24 Hrs  T(C): 36.5 (2024 12:15), Max: 37.3 (2024 22:30)  T(F): 97.7 (2024 12:15), Max: 99.2 (2024 22:30)  HR: 66 (2024 12:15) (66 - 99)  BP: 115/67 (2024 12:15) (115/67 - 134/72)  BP(mean): 83 (2024 12:15) (83 - 83)  RR: 19 (2024 12:15) (17 - 19)  SpO2: 95% (2024 12:15) (93% - 95%)    Parameters below as of 2024 12:15  Patient On (Oxygen Delivery Method): room air    Constitutional: Awake, alert, in no acute distress.   HEENT: vEEG apparatus in place. Normocephalic, atraumatic, no proptosis or lid retraction.   Neck: supple  Respiratory: Lungs clear to ausculation bilaterally.   Cardiovascular: Regular rate and rhythm  GI: soft, non-tender, non-distended, bowel sounds present, no masses appreciated.  Extremities: No lower extremity edema  Neuro: Slow speech, delayed responses. Impaired memory    LABS  CBC - WBC/HGB/HTC/PLT: 8.50/12.1/38.2/177 (24)  BMP: Na/K/Cl/Bicarb/BUN/Cr/Gluc: 133/4.0/102/23/26/1.48/195 (24)  Anion Gap: 8 (24)  eGFR: 37 (24)  Calcium: 8.4 (24)  Phosphorus: 2.5 (24)  Magnesium: 1.5 (24)  LFT - Alb/Tprot/Tbili/Dbili/AlkPhos/ALT/AST: 3.2/--/0.3/--/106/13/21 (24)  PT/aPTT/INR: 11.0/25.4/0.96 (24)  Thyroid Stimulating Hormone, Serum: 0.598 (24)  Total T4/Free T4: 7.99/-- (24)

## 2024-07-31 NOTE — PHYSICAL THERAPY INITIAL EVALUATION ADULT - ADDITIONAL COMMENTS
Pt lives in an apartment alone, no stairs to enter, has HHA x 2 hours a day x 5 days per week. Pt ambulates with rollator at baseline.

## 2024-07-31 NOTE — PROGRESS NOTE ADULT - PROBLEM SELECTOR PLAN 4
Follows with Dr. Delaney at [facility]. Pt is receiving both chemo and radiation therapy. Last session for chemo 06/2024.    Plan:    - Consult rad/onc in AM  - f/u heme/onc recs  - Zofran PRN for nausea Pt with right sided 2+ pitting edema to knee, no edema on L, 2+ DP pulses. Doppler found DVT of LEFT LE (non-edematous) but negative for DVT on Right LE (edematous)    Plan:  - Holding therapeutic dose of Lovenox 60mg BID until neurology consult for safety iso brain mets Per outpatient Endo note (Dr. Esparza) 7/3/24 - A1C goal <8.5%. A1C 12.5 (7/31).   Home regimen: Lantus 17 units daily, Novolog 9 units with meals.     Plan:  - Inpatient insulin regimen: Lantus 17 units bedtime + Lispro 11 units premeal + moderate SS   - Consult endocrine for uncontrolled diabetes, home po/insulin regimen, and to establish care

## 2024-07-31 NOTE — PROGRESS NOTE ADULT - PROBLEM SELECTOR PLAN 5
Pt with right sided 1+ pitting edema to knee, no edema on L, 2+ DP pulses.    Plan:  - F/u b/l LE doppler Hypertensive urgency resolved (admission /95, EKG NSR with PVCs, S/p labetalol 20mg x2, nifedipine 30mg x1).  Home meds based on HIEE and surescripts: Amlodipine 10mg qd, Metoprolol Succinate ER 100mg qd, nifedipine ER 60mg qd    Plan:  - c/w metoprolol 100mg qd  - c/w nifedipine ER 60mg qd  - hold amlodipine Pt with right sided 2+ pitting edema to knee, no edema on L, 2+ DP pulses. Doppler found DVT of LEFT LE (non-edematous) but negative for DVT on Right LE (edematous). Per neurosurgery, ok to therapeutically anticoagulate despite brain metastases.    Plan:  - subQ Lovenox 60mg q24h (given DUNIA)

## 2024-07-31 NOTE — PROGRESS NOTE ADULT - SUBJECTIVE AND OBJECTIVE BOX
OVERNIGHT: Duplex U/S positive for L calf DVT, full AC with Lovenox 40 BID started.    SUBJECTIVE: She says she slept well, she denies pain or discomfort. She was A&Ox1 and sleepy during initial evaluation, improved to AOx3 later in the morning when encountered eating breakfast.      VITALS:  VITALS:  T(C): 37.3 (07-31-24 @ 06:03), Max: 37.3 (07-30-24 @ 22:30)  HR: 77 (07-31-24 @ 06:03) (77 - 100)  BP: 133/68 (07-31-24 @ 06:03) (118/67 - 134/72)  RR: 19 (07-31-24 @ 06:03) (17 - 19)  SpO2: 95% (07-31-24 @ 06:03) (92% - 95%)    PHYSICAL EXAM:  Constitutional: resting comfortably in bed; NAD  HEENT: NC/AT, EOMI, anicteric sclera, MMM  Neck: supple; no JVD  Respiratory: clear to auscultation bilaterally; no w/r/r  Cardiac: regular rate and rhythm; no m/r/g  GI: abdomen soft, nontender, nondistended; no rebound or guarding  Extremities: warm, no cyanosis, 1+ RLE edema up to the knee   Neurologic: AOx3, slow speech, cogwheeling, resting tremor in bilateral hands and chin, no focal deficits, moving all extremities equally  Psychiatric: affect and characteristics of appearance, verbalizations, behaviors      Consultant(s) Notes Reviewed:  [x] YES  [ ] NO  Care Discussed with Consultants/Other Providers [x] YES  [ ] NO    LABS:                        12.9   6.22  )-----------( 182      ( 29 Jul 2024 13:51 )             39.6     07-29    136  |  103  |  24<H>  ----------------------------<  332<H>  3.4<L>   |  23  |  1.20    Ca    9.0      29 Jul 2024 17:01    TPro  6.7  /  Alb  3.5  /  TBili  0.3  /  DBili  x   /  AST  19  /  ALT  16  /  AlkPhos  213<H>  07-29    PT/INR - ( 29 Jul 2024 13:51 )   PT: 11.0 sec;   INR: 0.96          PTT - ( 29 Jul 2024 13:51 )  PTT:25.4 sec  Urinalysis Basic - ( 29 Jul 2024 17:01 )    Color: x / Appearance: x / SG: x / pH: x  Gluc: 332 mg/dL / Ketone: x  / Bili: x / Urobili: x   Blood: x / Protein: x / Nitrite: x   Leuk Esterase: x / RBC: x / WBC x   Sq Epi: x / Non Sq Epi: x / Bacteria: x      CAPILLARY BLOOD GLUCOSE      POCT Blood Glucose.: 289 mg/dL (29 Jul 2024 21:29)  POCT Blood Glucose.: 232 mg/dL (29 Jul 2024 17:59)  POCT Blood Glucose.: 319 mg/dL (29 Jul 2024 16:56)  POCT Blood Glucose.: >600 mg/dL (29 Jul 2024 15:53)  POCT Blood Glucose.: >600 mg/dL (29 Jul 2024 13:23)        Urinalysis Basic - ( 29 Jul 2024 17:01 )    Color: x / Appearance: x / SG: x / pH: x  Gluc: 332 mg/dL / Ketone: x  / Bili: x / Urobili: x   Blood: x / Protein: x / Nitrite: x   Leuk Esterase: x / RBC: x / WBC x   Sq Epi: x / Non Sq Epi: x / Bacteria: x        RADIOLOGY, EKG, & ADDITIONAL TESTS:      Imaging Personally Reviewed:  [x] YES  [ ] NO      HEALTH ISSUES - PROBLEM Dx:  Other encephalopathy    Acute hyperglycemia    Type II diabetes mellitus    NSCLC metastatic to brain    Acute kidney injury superimposed on CKD    Hypertensive emergency    Hyperlipidemia    Prophylactic measure    Elevated troponin    Lower extremity edema         OVERNIGHT: Duplex U/S positive for L calf DVT, full AC with Lovenox 40 BID started.    SUBJECTIVE: She says she slept well, she endorses some dizziness and a mild headache, but is not in acute pain or discomfort. She was A&Ox1 on evaluation and sleepy improved to AOx2 later in the morning when encountered eating breakfast.      VITALS:  VITALS:  T(C): 37.3 (07-31-24 @ 06:03), Max: 37.3 (07-30-24 @ 22:30)  HR: 77 (07-31-24 @ 06:03) (77 - 100)  BP: 133/68 (07-31-24 @ 06:03) (118/67 - 134/72)  RR: 19 (07-31-24 @ 06:03) (17 - 19)  SpO2: 95% (07-31-24 @ 06:03) (92% - 95%)    PHYSICAL EXAM:  Constitutional: resting comfortably in bed; NAD  HEENT: NC/AT, EOMI, anicteric sclera, MMM  Neck: supple; no JVD  Respiratory: clear to auscultation bilaterally; no w/r/r  Cardiac: regular rate and rhythm; no m/r/g  GI: abdomen soft, nontender, nondistended; no rebound or guarding  : no suprapubic tenderness, no CVA tenderness  Extremities: warm, no cyanosis, 2+ RLE edema up to the knee, LLE non-edematous with slight tenderness to palpation  Neurologic: AOx2, slow speech, no focal deficits, moving all extremities equally  Psychiatric: affect and characteristics of appearance, verbalizations, behaviors      Consultant(s) Notes Reviewed:  [x] YES  [ ] NO  Care Discussed with Consultants/Other Providers [x] YES  [ ] NO    LABS:                        12.9   6.22  )-----------( 182      ( 29 Jul 2024 13:51 )             39.6     07-29    136  |  103  |  24<H>  ----------------------------<  332<H>  3.4<L>   |  23  |  1.20    Ca    9.0      29 Jul 2024 17:01    TPro  6.7  /  Alb  3.5  /  TBili  0.3  /  DBili  x   /  AST  19  /  ALT  16  /  AlkPhos  213<H>  07-29    PT/INR - ( 29 Jul 2024 13:51 )   PT: 11.0 sec;   INR: 0.96          PTT - ( 29 Jul 2024 13:51 )  PTT:25.4 sec  Urinalysis Basic - ( 29 Jul 2024 17:01 )    Color: x / Appearance: x / SG: x / pH: x  Gluc: 332 mg/dL / Ketone: x  / Bili: x / Urobili: x   Blood: x / Protein: x / Nitrite: x   Leuk Esterase: x / RBC: x / WBC x   Sq Epi: x / Non Sq Epi: x / Bacteria: x      CAPILLARY BLOOD GLUCOSE      POCT Blood Glucose.: 289 mg/dL (29 Jul 2024 21:29)  POCT Blood Glucose.: 232 mg/dL (29 Jul 2024 17:59)  POCT Blood Glucose.: 319 mg/dL (29 Jul 2024 16:56)  POCT Blood Glucose.: >600 mg/dL (29 Jul 2024 15:53)  POCT Blood Glucose.: >600 mg/dL (29 Jul 2024 13:23)        Urinalysis Basic - ( 29 Jul 2024 17:01 )    Color: x / Appearance: x / SG: x / pH: x  Gluc: 332 mg/dL / Ketone: x  / Bili: x / Urobili: x   Blood: x / Protein: x / Nitrite: x   Leuk Esterase: x / RBC: x / WBC x   Sq Epi: x / Non Sq Epi: x / Bacteria: x        RADIOLOGY, EKG, & ADDITIONAL TESTS:      Imaging Personally Reviewed:  [x] YES  [ ] NO      HEALTH ISSUES - PROBLEM Dx:  Other encephalopathy    Acute hyperglycemia    Type II diabetes mellitus    NSCLC metastatic to brain    Acute kidney injury superimposed on CKD    Hypertensive emergency    Hyperlipidemia    Prophylactic measure    Elevated troponin    Lower extremity edema         OVERNIGHT: Duplex U/S positive for L calf DVT, full AC with Lovenox 40 BID started.    SUBJECTIVE: She says she slept well, she endorses some dizziness and a mild headache, but is not in acute pain or discomfort, given po Tylenol x1. She was A&Ox1 on evaluation and sleepy improved to AOx2 later in the morning when encountered eating breakfast.      VITALS:  VITALS:  T(C): 37.3 (07-31-24 @ 06:03), Max: 37.3 (07-30-24 @ 22:30)  HR: 77 (07-31-24 @ 06:03) (77 - 100)  BP: 133/68 (07-31-24 @ 06:03) (118/67 - 134/72)  RR: 19 (07-31-24 @ 06:03) (17 - 19)  SpO2: 95% (07-31-24 @ 06:03) (92% - 95%)    PHYSICAL EXAM:  Constitutional: resting comfortably in bed; NAD  HEENT: NC/AT, EOMI, anicteric sclera, MMM  Neck: supple; no JVD  Respiratory: clear to auscultation bilaterally; no w/r/r  Cardiac: regular rate and rhythm; no m/r/g  GI: abdomen soft, nontender, nondistended; no rebound or guarding  : no suprapubic tenderness, no CVA tenderness  Extremities: warm, no cyanosis, 2+ RLE edema up to the knee, LLE non-edematous with slight tenderness to palpation  Neurologic: AOx2, slow speech, no focal deficits, moving all extremities equally  Psychiatric: affect and characteristics of appearance, verbalizations, behaviors      Consultant(s) Notes Reviewed:  [x] YES  [ ] NO  Care Discussed with Consultants/Other Providers [x] YES  [ ] NO    LABS:                        12.9   6.22  )-----------( 182      ( 29 Jul 2024 13:51 )             39.6     07-29    136  |  103  |  24<H>  ----------------------------<  332<H>  3.4<L>   |  23  |  1.20    Ca    9.0      29 Jul 2024 17:01    TPro  6.7  /  Alb  3.5  /  TBili  0.3  /  DBili  x   /  AST  19  /  ALT  16  /  AlkPhos  213<H>  07-29    PT/INR - ( 29 Jul 2024 13:51 )   PT: 11.0 sec;   INR: 0.96          PTT - ( 29 Jul 2024 13:51 )  PTT:25.4 sec  Urinalysis Basic - ( 29 Jul 2024 17:01 )    Color: x / Appearance: x / SG: x / pH: x  Gluc: 332 mg/dL / Ketone: x  / Bili: x / Urobili: x   Blood: x / Protein: x / Nitrite: x   Leuk Esterase: x / RBC: x / WBC x   Sq Epi: x / Non Sq Epi: x / Bacteria: x      CAPILLARY BLOOD GLUCOSE      POCT Blood Glucose.: 289 mg/dL (29 Jul 2024 21:29)  POCT Blood Glucose.: 232 mg/dL (29 Jul 2024 17:59)  POCT Blood Glucose.: 319 mg/dL (29 Jul 2024 16:56)  POCT Blood Glucose.: >600 mg/dL (29 Jul 2024 15:53)  POCT Blood Glucose.: >600 mg/dL (29 Jul 2024 13:23)        Urinalysis Basic - ( 29 Jul 2024 17:01 )    Color: x / Appearance: x / SG: x / pH: x  Gluc: 332 mg/dL / Ketone: x  / Bili: x / Urobili: x   Blood: x / Protein: x / Nitrite: x   Leuk Esterase: x / RBC: x / WBC x   Sq Epi: x / Non Sq Epi: x / Bacteria: x        RADIOLOGY, EKG, & ADDITIONAL TESTS:      Imaging Personally Reviewed:  [x] YES  [ ] NO      HEALTH ISSUES - PROBLEM Dx:  Other encephalopathy    Acute hyperglycemia    Type II diabetes mellitus    NSCLC metastatic to brain    Acute kidney injury superimposed on CKD    Hypertensive emergency    Hyperlipidemia    Prophylactic measure    Elevated troponin    Lower extremity edema

## 2024-08-01 VITALS
HEART RATE: 71 BPM | DIASTOLIC BLOOD PRESSURE: 83 MMHG | TEMPERATURE: 99 F | RESPIRATION RATE: 18 BRPM | OXYGEN SATURATION: 92 % | SYSTOLIC BLOOD PRESSURE: 162 MMHG

## 2024-08-01 LAB
-  AMOXICILLIN/CLAVULANIC ACID: SIGNIFICANT CHANGE UP
-  AMPICILLIN/SULBACTAM: SIGNIFICANT CHANGE UP
-  AMPICILLIN: SIGNIFICANT CHANGE UP
-  CEFAZOLIN: SIGNIFICANT CHANGE UP
-  CEFEPIME: SIGNIFICANT CHANGE UP
-  CEFOXITIN: SIGNIFICANT CHANGE UP
-  CEFTRIAXONE: SIGNIFICANT CHANGE UP
-  CIPROFLOXACIN: SIGNIFICANT CHANGE UP
-  GENTAMICIN: SIGNIFICANT CHANGE UP
-  LEVOFLOXACIN: SIGNIFICANT CHANGE UP
-  NITROFURANTOIN: SIGNIFICANT CHANGE UP
-  PIPERACILLIN/TAZOBACTAM: SIGNIFICANT CHANGE UP
-  TOBRAMYCIN: SIGNIFICANT CHANGE UP
-  TRIMETHOPRIM/SULFAMETHOXAZOLE: SIGNIFICANT CHANGE UP
ALBUMIN SERPL ELPH-MCNC: 2.6 G/DL — LOW (ref 3.3–5)
ALP SERPL-CCNC: 104 U/L — SIGNIFICANT CHANGE UP (ref 40–120)
ALT FLD-CCNC: 13 U/L — SIGNIFICANT CHANGE UP (ref 10–45)
ANION GAP SERPL CALC-SCNC: 9 MMOL/L — SIGNIFICANT CHANGE UP (ref 5–17)
ANISOCYTOSIS BLD QL: SLIGHT — SIGNIFICANT CHANGE UP
AST SERPL-CCNC: 19 U/L — SIGNIFICANT CHANGE UP (ref 10–40)
BASOPHILS # BLD AUTO: 0 K/UL — SIGNIFICANT CHANGE UP (ref 0–0.2)
BASOPHILS NFR BLD AUTO: 0 % — SIGNIFICANT CHANGE UP (ref 0–2)
BILIRUB SERPL-MCNC: 0.2 MG/DL — SIGNIFICANT CHANGE UP (ref 0.2–1.2)
BUN SERPL-MCNC: 24 MG/DL — HIGH (ref 7–23)
BURR CELLS BLD QL SMEAR: PRESENT — SIGNIFICANT CHANGE UP
CALCIUM SERPL-MCNC: 8.2 MG/DL — LOW (ref 8.4–10.5)
CHLORIDE SERPL-SCNC: 101 MMOL/L — SIGNIFICANT CHANGE UP (ref 96–108)
CO2 SERPL-SCNC: 25 MMOL/L — SIGNIFICANT CHANGE UP (ref 22–31)
CREAT SERPL-MCNC: 1.26 MG/DL — SIGNIFICANT CHANGE UP (ref 0.5–1.3)
CULTURE RESULTS: ABNORMAL
EGFR: 45 ML/MIN/1.73M2 — LOW
EOSINOPHIL # BLD AUTO: 0.08 K/UL — SIGNIFICANT CHANGE UP (ref 0–0.5)
EOSINOPHIL NFR BLD AUTO: 0.9 % — SIGNIFICANT CHANGE UP (ref 0–6)
GIANT PLATELETS BLD QL SMEAR: PRESENT — SIGNIFICANT CHANGE UP
GLUCOSE BLDC GLUCOMTR-MCNC: 218 MG/DL — HIGH (ref 70–99)
GLUCOSE BLDC GLUCOMTR-MCNC: 253 MG/DL — HIGH (ref 70–99)
GLUCOSE BLDC GLUCOMTR-MCNC: 282 MG/DL — HIGH (ref 70–99)
GLUCOSE SERPL-MCNC: 196 MG/DL — HIGH (ref 70–99)
HCT VFR BLD CALC: 36.7 % — SIGNIFICANT CHANGE UP (ref 34.5–45)
HGB BLD-MCNC: 11.8 G/DL — SIGNIFICANT CHANGE UP (ref 11.5–15.5)
LYMPHOCYTES # BLD AUTO: 1.01 K/UL — SIGNIFICANT CHANGE UP (ref 1–3.3)
LYMPHOCYTES # BLD AUTO: 11.4 % — LOW (ref 13–44)
MACROCYTES BLD QL: SLIGHT — SIGNIFICANT CHANGE UP
MAGNESIUM SERPL-MCNC: 1.8 MG/DL — SIGNIFICANT CHANGE UP (ref 1.6–2.6)
MANUAL SMEAR VERIFICATION: SIGNIFICANT CHANGE UP
MCHC RBC-ENTMCNC: 28 PG — SIGNIFICANT CHANGE UP (ref 27–34)
MCHC RBC-ENTMCNC: 32.2 GM/DL — SIGNIFICANT CHANGE UP (ref 32–36)
MCV RBC AUTO: 87 FL — SIGNIFICANT CHANGE UP (ref 80–100)
METAMYELOCYTES # FLD: 4.4 % — HIGH (ref 0–0)
METHOD TYPE: SIGNIFICANT CHANGE UP
MICROCYTES BLD QL: SLIGHT — SIGNIFICANT CHANGE UP
MONOCYTES # BLD AUTO: 0.39 K/UL — SIGNIFICANT CHANGE UP (ref 0–0.9)
MONOCYTES NFR BLD AUTO: 4.4 % — SIGNIFICANT CHANGE UP (ref 2–14)
MYELOCYTES NFR BLD: 0.9 % — HIGH (ref 0–0)
NEUTROPHILS # BLD AUTO: 6.88 K/UL — SIGNIFICANT CHANGE UP (ref 1.8–7.4)
NEUTROPHILS NFR BLD AUTO: 76.3 % — SIGNIFICANT CHANGE UP (ref 43–77)
NEUTS BAND # BLD: 1.7 % — SIGNIFICANT CHANGE UP (ref 0–8)
ORGANISM # SPEC MICROSCOPIC CNT: ABNORMAL
ORGANISM # SPEC MICROSCOPIC CNT: SIGNIFICANT CHANGE UP
OVALOCYTES BLD QL SMEAR: SLIGHT — SIGNIFICANT CHANGE UP
PHOSPHATE SERPL-MCNC: 1.8 MG/DL — LOW (ref 2.5–4.5)
PLAT MORPH BLD: ABNORMAL
PLATELET # BLD AUTO: 159 K/UL — SIGNIFICANT CHANGE UP (ref 150–400)
POIKILOCYTOSIS BLD QL AUTO: SIGNIFICANT CHANGE UP
POLYCHROMASIA BLD QL SMEAR: SLIGHT — SIGNIFICANT CHANGE UP
POTASSIUM SERPL-MCNC: 4 MMOL/L — SIGNIFICANT CHANGE UP (ref 3.5–5.3)
POTASSIUM SERPL-SCNC: 4 MMOL/L — SIGNIFICANT CHANGE UP (ref 3.5–5.3)
PROT SERPL-MCNC: 5.7 G/DL — LOW (ref 6–8.3)
RBC # BLD: 4.22 M/UL — SIGNIFICANT CHANGE UP (ref 3.8–5.2)
RBC # FLD: 13.9 % — SIGNIFICANT CHANGE UP (ref 10.3–14.5)
RBC BLD AUTO: ABNORMAL
SCHISTOCYTES BLD QL AUTO: SLIGHT — SIGNIFICANT CHANGE UP
SODIUM SERPL-SCNC: 135 MMOL/L — SIGNIFICANT CHANGE UP (ref 135–145)
SPECIMEN SOURCE: SIGNIFICANT CHANGE UP
SPHEROCYTES BLD QL SMEAR: SLIGHT — SIGNIFICANT CHANGE UP
WBC # BLD: 8.82 K/UL — SIGNIFICANT CHANGE UP (ref 3.8–10.5)
WBC # FLD AUTO: 8.82 K/UL — SIGNIFICANT CHANGE UP (ref 3.8–10.5)

## 2024-08-01 PROCEDURE — 84295 ASSAY OF SERUM SODIUM: CPT

## 2024-08-01 PROCEDURE — 70498 CT ANGIOGRAPHY NECK: CPT | Mod: MC

## 2024-08-01 PROCEDURE — 82330 ASSAY OF CALCIUM: CPT

## 2024-08-01 PROCEDURE — 96376 TX/PRO/DX INJ SAME DRUG ADON: CPT

## 2024-08-01 PROCEDURE — 80053 COMPREHEN METABOLIC PANEL: CPT

## 2024-08-01 PROCEDURE — 96375 TX/PRO/DX INJ NEW DRUG ADDON: CPT

## 2024-08-01 PROCEDURE — 0042T: CPT | Mod: MC

## 2024-08-01 PROCEDURE — 99232 SBSQ HOSP IP/OBS MODERATE 35: CPT | Mod: GC

## 2024-08-01 PROCEDURE — 80048 BASIC METABOLIC PNL TOTAL CA: CPT

## 2024-08-01 PROCEDURE — 93970 EXTREMITY STUDY: CPT

## 2024-08-01 PROCEDURE — 93005 ELECTROCARDIOGRAM TRACING: CPT

## 2024-08-01 PROCEDURE — 87186 SC STD MICRODIL/AGAR DIL: CPT

## 2024-08-01 PROCEDURE — 95705 EEG W/O VID 2-12 HR UNMNTR: CPT

## 2024-08-01 PROCEDURE — 70450 CT HEAD/BRAIN W/O DYE: CPT | Mod: MC

## 2024-08-01 PROCEDURE — 95700 EEG CONT REC W/VID EEG TECH: CPT

## 2024-08-01 PROCEDURE — 70496 CT ANGIOGRAPHY HEAD: CPT | Mod: MC

## 2024-08-01 PROCEDURE — 36415 COLL VENOUS BLD VENIPUNCTURE: CPT

## 2024-08-01 PROCEDURE — 99291 CRITICAL CARE FIRST HOUR: CPT | Mod: 25

## 2024-08-01 PROCEDURE — 85730 THROMBOPLASTIN TIME PARTIAL: CPT

## 2024-08-01 PROCEDURE — 99222 1ST HOSP IP/OBS MODERATE 55: CPT

## 2024-08-01 PROCEDURE — 84484 ASSAY OF TROPONIN QUANT: CPT

## 2024-08-01 PROCEDURE — 85610 PROTHROMBIN TIME: CPT

## 2024-08-01 PROCEDURE — 83735 ASSAY OF MAGNESIUM: CPT

## 2024-08-01 PROCEDURE — 84100 ASSAY OF PHOSPHORUS: CPT

## 2024-08-01 PROCEDURE — 81001 URINALYSIS AUTO W/SCOPE: CPT

## 2024-08-01 PROCEDURE — 83036 HEMOGLOBIN GLYCOSYLATED A1C: CPT

## 2024-08-01 PROCEDURE — 85025 COMPLETE CBC W/AUTO DIFF WBC: CPT

## 2024-08-01 PROCEDURE — 96374 THER/PROPH/DIAG INJ IV PUSH: CPT

## 2024-08-01 PROCEDURE — 95708 EEG WO VID EA 12-26HR UNMNTR: CPT

## 2024-08-01 PROCEDURE — 87077 CULTURE AEROBIC IDENTIFY: CPT

## 2024-08-01 PROCEDURE — 84132 ASSAY OF SERUM POTASSIUM: CPT

## 2024-08-01 PROCEDURE — 97161 PT EVAL LOW COMPLEX 20 MIN: CPT

## 2024-08-01 PROCEDURE — 82803 BLOOD GASES ANY COMBINATION: CPT

## 2024-08-01 PROCEDURE — 87086 URINE CULTURE/COLONY COUNT: CPT

## 2024-08-01 PROCEDURE — 82962 GLUCOSE BLOOD TEST: CPT

## 2024-08-01 PROCEDURE — 82010 KETONE BODYS QUAN: CPT

## 2024-08-01 RX ORDER — INSULIN GLARGINE-YFGN 100 [IU]/ML
32 INJECTION, SOLUTION SUBCUTANEOUS
Qty: 1 | Refills: 0
Start: 2024-08-01 | End: 2024-08-30

## 2024-08-01 RX ORDER — INSULIN LISPRO 100/ML
18 VIAL (ML) SUBCUTANEOUS
Refills: 0 | Status: DISCONTINUED | OUTPATIENT
Start: 2024-08-01 | End: 2024-08-01

## 2024-08-01 RX ORDER — NIFEDIPINE 20 MG/1
1 CAPSULE, LIQUID FILLED ORAL
Qty: 30 | Refills: 0
Start: 2024-08-01 | End: 2024-08-30

## 2024-08-01 RX ORDER — CEFPODOXIME PROXETIL 100 MG
1 TABLET ORAL
Qty: 6 | Refills: 0
Start: 2024-08-01 | End: 2024-08-03

## 2024-08-01 RX ORDER — ATORVASTATIN CALCIUM 40 MG/1
1 TABLET, FILM COATED ORAL
Qty: 0 | Refills: 0 | DISCHARGE
Start: 2024-08-01

## 2024-08-01 RX ORDER — NIFEDIPINE 20 MG/1
1 CAPSULE, LIQUID FILLED ORAL
Qty: 0 | Refills: 0 | DISCHARGE
Start: 2024-08-01

## 2024-08-01 RX ORDER — APIXABAN 5 MG/1
1 TABLET, FILM COATED ORAL
Qty: 74 | Refills: 0
Start: 2024-08-01 | End: 2024-08-30

## 2024-08-01 RX ORDER — INSULIN GLARGINE-YFGN 100 [IU]/ML
32 INJECTION, SOLUTION SUBCUTANEOUS AT BEDTIME
Refills: 0 | Status: DISCONTINUED | OUTPATIENT
Start: 2024-08-01 | End: 2024-08-01

## 2024-08-01 RX ORDER — MAGNESIUM SULFATE 500 MG/ML
1 VIAL (ML) INJECTION ONCE
Refills: 0 | Status: COMPLETED | OUTPATIENT
Start: 2024-08-01 | End: 2024-08-01

## 2024-08-01 RX ORDER — INSULIN LISPRO 100/ML
18 VIAL (ML) SUBCUTANEOUS
Qty: 1 | Refills: 0
Start: 2024-08-01 | End: 2024-08-30

## 2024-08-01 RX ORDER — INSULIN GLARGINE-YFGN 100 [IU]/ML
25 INJECTION, SOLUTION SUBCUTANEOUS
Qty: 0 | Refills: 0 | DISCHARGE
Start: 2024-08-01

## 2024-08-01 RX ORDER — SODIUM PHOSPHATE, MONOBASIC, MONOHYDRATE 276; 142 MG/ML; MG/ML
30 INJECTION, SOLUTION INTRAVENOUS ONCE
Refills: 0 | Status: COMPLETED | OUTPATIENT
Start: 2024-08-01 | End: 2024-08-01

## 2024-08-01 RX ORDER — BENZOCAINE .06; .7 ML/1; ML/1
0 SWAB TOPICAL
Qty: 100 | Refills: 1
Start: 2024-08-01

## 2024-08-01 RX ORDER — METOPROLOL TARTRATE 100 MG
100 TABLET ORAL EVERY 24 HOURS
Refills: 0 | Status: DISCONTINUED | OUTPATIENT
Start: 2024-08-01 | End: 2024-08-01

## 2024-08-01 RX ADMIN — Medication 6: at 18:38

## 2024-08-01 RX ADMIN — NIFEDIPINE 60 MILLIGRAM(S): 20 CAPSULE, LIQUID FILLED ORAL at 06:19

## 2024-08-01 RX ADMIN — Medication 100 GRAM(S): at 13:33

## 2024-08-01 RX ADMIN — Medication 100 MILLIGRAM(S): at 11:55

## 2024-08-01 RX ADMIN — Medication 6: at 09:30

## 2024-08-01 RX ADMIN — Medication 4: at 13:21

## 2024-08-01 RX ADMIN — Medication 14 UNIT(S): at 09:31

## 2024-08-01 RX ADMIN — Medication 100 MILLIGRAM(S): at 11:54

## 2024-08-01 RX ADMIN — ENOXAPARIN SODIUM 60 MILLIGRAM(S): 120 INJECTION SUBCUTANEOUS at 11:55

## 2024-08-01 RX ADMIN — Medication 18 UNIT(S): at 18:37

## 2024-08-01 RX ADMIN — NYSTATIN 1 APPLICATION(S): 100000 POWDER TOPICAL at 19:19

## 2024-08-01 RX ADMIN — SODIUM PHOSPHATE, MONOBASIC, MONOHYDRATE 85 MILLIMOLE(S): 276; 142 INJECTION, SOLUTION INTRAVENOUS at 15:05

## 2024-08-01 NOTE — CONSULT NOTE ADULT - PROVIDER SPECIALTY LIST ADULT
Endocrinology
Infectious Disease
Rehab Medicine
Neurology
Neurosurgery
Heme/Onc
Neurology
Palliative Care

## 2024-08-01 NOTE — PROGRESS NOTE ADULT - SUBJECTIVE AND OBJECTIVE BOX
OVERNIGHT/INTERVAL EVENTS: NAEO    SUBJECTIVE: She says she slept well, she endorses some dizziness and a mild headache, but is not in acute pain or discomfort, given po Tylenol x1. She was A&Ox1 on evaluation and sleepy improved to AOx2 later in the morning when encountered eating breakfast.    VITALS:  T(C): 37.4 (07-31-24 @ 20:20), Max: 37.4 (07-31-24 @ 20:20)  HR: 73 (07-31-24 @ 20:20) (66 - 73)  BP: 133/75 (07-31-24 @ 20:20) (115/67 - 133/75)  RR: 18 (07-31-24 @ 20:20) (18 - 19)  SpO2: 92% (07-31-24 @ 20:20) (92% - 95%)    PHYSICAL EXAM:  Constitutional: resting comfortably in bed; NAD  HEENT: NC/AT, EOMI, anicteric sclera, MMM  Neck: supple; no JVD  Respiratory: clear to auscultation bilaterally; no w/r/r  Cardiac: regular rate and rhythm; no m/r/g  GI: abdomen soft, nontender, nondistended; no rebound or guarding  : no suprapubic tenderness, no CVA tenderness  Extremities: warm, no cyanosis, 2+ RLE edema up to the knee, LLE non-edematous with slight tenderness to palpation  Neurologic: AOx2, slow speech, no focal deficits, moving all extremities equally  Psychiatric: affect and characteristics of appearance, verbalizations, behaviors      Consultant(s) Notes Reviewed:  [x] YES  [ ] NO  Care Discussed with Consultants/Other Providers [x] YES  [ ] NO    LABS:                        12.9   6.22  )-----------( 182      ( 29 Jul 2024 13:51 )             39.6     07-29    136  |  103  |  24<H>  ----------------------------<  332<H>  3.4<L>   |  23  |  1.20    Ca    9.0      29 Jul 2024 17:01    TPro  6.7  /  Alb  3.5  /  TBili  0.3  /  DBili  x   /  AST  19  /  ALT  16  /  AlkPhos  213<H>  07-29    PT/INR - ( 29 Jul 2024 13:51 )   PT: 11.0 sec;   INR: 0.96          PTT - ( 29 Jul 2024 13:51 )  PTT:25.4 sec  Urinalysis Basic - ( 29 Jul 2024 17:01 )    Color: x / Appearance: x / SG: x / pH: x  Gluc: 332 mg/dL / Ketone: x  / Bili: x / Urobili: x   Blood: x / Protein: x / Nitrite: x   Leuk Esterase: x / RBC: x / WBC x   Sq Epi: x / Non Sq Epi: x / Bacteria: x      CAPILLARY BLOOD GLUCOSE      POCT Blood Glucose.: 289 mg/dL (29 Jul 2024 21:29)  POCT Blood Glucose.: 232 mg/dL (29 Jul 2024 17:59)  POCT Blood Glucose.: 319 mg/dL (29 Jul 2024 16:56)  POCT Blood Glucose.: >600 mg/dL (29 Jul 2024 15:53)  POCT Blood Glucose.: >600 mg/dL (29 Jul 2024 13:23)        Urinalysis Basic - ( 29 Jul 2024 17:01 )    Color: x / Appearance: x / SG: x / pH: x  Gluc: 332 mg/dL / Ketone: x  / Bili: x / Urobili: x   Blood: x / Protein: x / Nitrite: x   Leuk Esterase: x / RBC: x / WBC x   Sq Epi: x / Non Sq Epi: x / Bacteria: x        RADIOLOGY, EKG, & ADDITIONAL TESTS:      Imaging Personally Reviewed:  [x] YES  [ ] NO      HEALTH ISSUES - PROBLEM Dx:  Other encephalopathy    Acute hyperglycemia    Type II diabetes mellitus    NSCLC metastatic to brain    Acute kidney injury superimposed on CKD    Hypertensive emergency    Hyperlipidemia    Prophylactic measure    Elevated troponin    Lower extremity edema         OVERNIGHT/INTERVAL EVENTS: NAEO    SUBJECTIVE: She says she slept well, she endorses some dizziness and a mild headache, but is not in acute pain or discomfort, given po Tylenol x1. She was A&Ox3 on evaluation and sleepy.    VITALS:  T(C): 37.4 (07-31-24 @ 20:20), Max: 37.4 (07-31-24 @ 20:20)  HR: 73 (07-31-24 @ 20:20) (66 - 73)  BP: 133/75 (07-31-24 @ 20:20) (115/67 - 133/75)  RR: 18 (07-31-24 @ 20:20) (18 - 19)  SpO2: 92% (07-31-24 @ 20:20) (92% - 95%)    PHYSICAL EXAM:  Constitutional: resting comfortably in bed; NAD  HEENT: NC/AT, EOMI, anicteric sclera, MMM  Neck: supple; no JVD  Respiratory: clear to auscultation bilaterally; no w/r/r  Cardiac: regular rate and rhythm; no m/r/g  GI: abdomen soft, nontender, nondistended; no rebound or guarding  : no suprapubic tenderness, no CVA tenderness  Extremities: warm, no cyanosis, 2+ RLE edema up to the knee, LLE non-edematous with slight tenderness to palpation  Neurologic: AOx3, slow speech, no focal deficits, moving all extremities equally  Psychiatric: normal affect and characteristics of appearance, verbalizations, behaviors      Consultant(s) Notes Reviewed:  [x] YES  [ ] NO  Care Discussed with Consultants/Other Providers [x] YES  [ ] NO    LABS:                        12.9   6.22  )-----------( 182      ( 29 Jul 2024 13:51 )             39.6     07-29    136  |  103  |  24<H>  ----------------------------<  332<H>  3.4<L>   |  23  |  1.20    Ca    9.0      29 Jul 2024 17:01    TPro  6.7  /  Alb  3.5  /  TBili  0.3  /  DBili  x   /  AST  19  /  ALT  16  /  AlkPhos  213<H>  07-29    PT/INR - ( 29 Jul 2024 13:51 )   PT: 11.0 sec;   INR: 0.96          PTT - ( 29 Jul 2024 13:51 )  PTT:25.4 sec  Urinalysis Basic - ( 29 Jul 2024 17:01 )    Color: x / Appearance: x / SG: x / pH: x  Gluc: 332 mg/dL / Ketone: x  / Bili: x / Urobili: x   Blood: x / Protein: x / Nitrite: x   Leuk Esterase: x / RBC: x / WBC x   Sq Epi: x / Non Sq Epi: x / Bacteria: x      CAPILLARY BLOOD GLUCOSE      POCT Blood Glucose.: 289 mg/dL (29 Jul 2024 21:29)  POCT Blood Glucose.: 232 mg/dL (29 Jul 2024 17:59)  POCT Blood Glucose.: 319 mg/dL (29 Jul 2024 16:56)  POCT Blood Glucose.: >600 mg/dL (29 Jul 2024 15:53)  POCT Blood Glucose.: >600 mg/dL (29 Jul 2024 13:23)        Urinalysis Basic - ( 29 Jul 2024 17:01 )    Color: x / Appearance: x / SG: x / pH: x  Gluc: 332 mg/dL / Ketone: x  / Bili: x / Urobili: x   Blood: x / Protein: x / Nitrite: x   Leuk Esterase: x / RBC: x / WBC x   Sq Epi: x / Non Sq Epi: x / Bacteria: x        RADIOLOGY, EKG, & ADDITIONAL TESTS:      Imaging Personally Reviewed:  [x] YES  [ ] NO      HEALTH ISSUES - PROBLEM Dx:  Other encephalopathy    Acute hyperglycemia    Type II diabetes mellitus    NSCLC metastatic to brain    Acute kidney injury superimposed on CKD    Hypertensive emergency    Hyperlipidemia    Prophylactic measure    Elevated troponin    Lower extremity edema

## 2024-08-01 NOTE — PROGRESS NOTE ADULT - PROBLEM SELECTOR PLAN 4
Per outpatient Endo note (Dr. Esparza) 7/3/24 - A1C goal <8.5%. A1C 12.5 (7/31).   Home regimen: Lantus 17 units daily, Novolog 9 units with meals.     Plan:  - endocrine consulted:      - Lantus 25 bedtime, Lispro 14 premeal      - moderate SS (fingerstick glucose goal is 100-180 mg/dL)  - diabetes educator consulted Per outpatient Endo note (Dr. Esparza) 7/3/24 - A1C goal <8.5%. A1C 12.5 (7/31).   Home regimen: Lantus 17 units daily, Novolog 9 units with meals.     Plan:  - endocrine consulted:      - Lantus 32 bedtime, Lispro 18 premeal      - moderate SS (fingerstick glucose goal is 100-180 mg/dL)  - diabetes educator consulted

## 2024-08-01 NOTE — PROGRESS NOTE ADULT - ASSESSMENT
I M    74 y o F w/ PMH of T2DM, NSCLC adenocarcinoma with mets to brain (Dx 2023), s/p XRT currently on weekly chemo, R hip replacement, RA, CKD IIIA, HTN and HLD who presents after witnessed mechanical fall, admitted for AMS, hypertensive urgency, and hyperglycemia (FSG>600).     Problem/Plan - 1:  ·  Problem: Fall.   ·  Plan: Pt p/w witnessed mechanical fall but no LOC or head strike, AOx0 on admission, BP P 131/85-->223/95-->149/74, FSG >600. Stroke code called in the ED, NIHSS 0, CTH without acute pathology of stroke but notable for brain mets. No leukocytosis, UA with 8 WBC and many bacteria but pt asymptomatic. s/p CTX 1g. On later evaluation, AOx3, pt recalls her fall and claims that she tripped. Neurological exam significant for shuffling and narrow gait, cogwheeling, slow speech, and resting tremors in bilateral hands and chin. vEEG noted mild excess intermittent slow wave activity shifting between hemispheres but no seizure activity.  Per chart review, TTE in 2023 noted normal LV size and systolic function, moderate LVH. MRI in June 2024 significant for 2 new brain metastases.   Of note, pt's compliance with home BP meds and insulin is unclear. Currently unknown where or how patient has been getting her medications (spoke with daughter and called multiple pharmacies, but none have recently dispensed her any medications).   Ddx of fall/encephalopathy: hypertensive emergency, hyperglycemia, brain metastases, seizure, untreated Parkinson's. Lower suspicion for infection or cardiac syncope.     Plan:  - f/u Neurology consult for evaluation of potential parkinson's, appreciate recs  - Consult neurosurgery for safety of AC in setting of brain metastases  - Daily BMP  - Hold PT/OT evaluation until neuro consult for AC   - Palliative consulted, appreciate assistance - pt full care/full code.    Problem/Plan - 2:  ·  Problem: Acute kidney injury superimposed on CKD.   ·  Plan: Baseline Cr ~1.2. On admission Cr 1.38, elevated likely 2/2 hypertensive emergency, Cr downtrended to 1.1 s/p 2L NS.   However, Cr elevated to 1.48 on 7/31, suspect IV contrast-induced nephropathy (iso CTA H/N on 7/29)    - 1L NS over 5 hrs  - CTM BMP.    Problem/Plan - 3:  ·  Problem: Bacteria in urine.   ·  Plan: Patient's urinalysis and urine cultures from ED admission grossly positive. Preliminary UC growing >120,000 gram negative rods. Patient asymptomatic, afebrile, no leukocytosis. S/p 1g CTX in ED on 7/29.    Plan:    -Start 3-day course of IV CTX 1g (7/31-8/2).    Problem/Plan - 4:  ·  Problem: Type II diabetes mellitus.   ·  Plan: Per outpatient Endo note (Dr. Esparza) 7/3/24 - A1C goal <8.5%. A1C 12.5 (7/31).   Home regimen: Lantus 17 units daily, Novolog 9 units with meals.     Plan:  - Inpatient insulin regimen: Lantus 17 units bedtime + Lispro 11 units premeal + moderate SS   - Consult endocrine for uncontrolled diabetes, home po/insulin regimen, and to establish care.    Problem/Plan - 5:  ·  Problem: Deep vein thrombosis (DVT) of calf muscle vein of left lower extremity.   ·  Plan: Pt with right sided 2+ pitting edema to knee, no edema on L, 2+ DP pulses. Doppler found DVT of LEFT LE (non-edematous) but negative for DVT on Right LE (edematous). Per neurosurgery, ok to therapeutically anticoagulate despite brain metastases.    Plan:  - subQ Lovenox 60mg q24h (given DUNIA).    Problem/Plan - 6:  ·  Problem: Hypertensive urgency.   ·  Plan: Hypertensive urgency resolved (admission /95, EKG NSR with PVCs, S/p labetalol 20mg x2, nifedipine 30mg x1).  Home meds based on HIEE and surescripts: Amlodipine 10mg qd, Metoprolol Succinate ER 100mg qd, nifedipine ER 60mg qd    Plan:  - c/w metoprolol 100mg qd  - c/w nifedipine ER 60mg qd  - hold amlodipine.    Problem/Plan - 7:  ·  Problem: NSCLC metastatic to brain.   ·  Plan: Follows with Dr. Delaney at [facility]. Pt is receiving both chemo and radiation therapy. Last session for chemo 06/2024. Pt taken for radiation therapy on 7/31.    Plan:  - Rad/onc consulted, appreciate recs  - Heme/onc consulted, appreciate recs: no plans for chemotherapy while inpatient  - Zofran PRN for nausea.    Problem/Plan - 8:  ·  Problem: Acute hyperglycemia.   ·  Plan: Pt endorses compliance with insulin regimen but in ED blood glucose >700, BHB 0.5. Per chart review, pt has had ED visits in the past with hyperglycemia. Low concern for DKA as ABG w/o acidosis or anion gap. s/p 15U insulin in the ED and FSG improved to 289. Pt is asymptomatic.     Plan:    - Plan as above.    Problem/Plan - 9:  ·  Problem: Hyperlipidemia.   ·  Plan: Home meds: Atorvastatin Calcium 40 MG Oral at night    Plan:    - c/w home atorvastatin.    Problem/Plan - 10:  ·  Problem: Rheumatoid arthritis.   ·  Plan; Home meds based on surescripts: Hydroxychloroquine Sulfate 200 MG BID, sulfasalazine 500mg BID,    Plan:    - holding home meds.    Problem/Plan - 11:  ·  Problem: Prophylactic measure.   ·  Plan: Fluids: none  Electrolytes: Mg >2, K >4  Nutrition: consistent carb  Prophylaxis: lovenox  Activity: PT/OT eval  GI: none  C: FC/FC  Dispo: [facility].

## 2024-08-01 NOTE — CONSULT NOTE ADULT - ASSESSMENT
75 yo F w/ PMH of uncontrolled T2DM, NSCLC adenocarcinoma with mets to brain (Dx 2023), s/p XRT, on chemo, R hip replacement, RA, CKD IIIA, HTN and HLD presenting for witnessed mechanical fall while waiting for elevator at [id number]. Pt does not recall the events leading up to her hospitalization and initially found to be altered. Stroke code was called in the ED. VEEG negative. Neuro was consulted AMS and fall unlikely d/t parkinsons, likely d/t multiple CNS leions from progressive brain mets. Patient was found to have UA w/ small LE, neg nitrite, WBC 8, and bacteria numerous, UCx growing pansensitive Klebsiella variicola. Patient w/ previous Ucx growing resistant K. Oxytoca/Raoutella ornithionlytica. ID was consulted for Ucx growing klebsiella iso previous resistant klebsiella strain.     #Asymptomatic bacteriuria   Patient overall asymptomatic, although does endorse urinary frequency, she has uncontrolled DM w/ glucose >1000 in urine and A1c of 12.4. Urinary frequency likely d/t uncontrolled DM. Denies dysuria or urinary urgency. Patient also received 3 doses of CTX 1g q24 which would cover Klebsiella. At this time low suspicion for acute UTI infection.    Recommendation:  - Monitor off Abx, low concern for infection.    ID team will sign off  Discussed w/ Dr. Kramer.

## 2024-08-01 NOTE — CONSULT NOTE ADULT - CONSULT REQUESTED DATE/TIME
01-Aug-2024 14:58
29-Jul-2024 16:52
29-Jul-2024 13:54
30-Jul-2024 08:44
31-Jul-2024 08:00
31-Jul-2024 12:00
30-Jul-2024 06:20
30-Jul-2024 10:27

## 2024-08-01 NOTE — PROGRESS NOTE ADULT - PROBLEM SELECTOR PLAN 3
Patient's urinalysis and urine cultures from ED admission grossly positive although pt is asymptomatic, afebrile, no leukocytosis. Has h/o of MDRO UTI. UCx growing Klebsiella varicola.    Plan:  - f/u UCx sensitivities  -c/w IV CTX 1g (7/31-8/2) Patient's urinalysis and urine cultures from ED admission grossly positive although pt is asymptomatic, afebrile, no leukocytosis. Has h/o of MDRO UTI. UCx growing Klebsiella varicola.    Plan:  - Consult ID for sensitivities of UA - confirm CTX has adequate coverage  -c/w IV CTX 1g (7/31-8/2)

## 2024-08-01 NOTE — PROGRESS NOTE ADULT - ATTENDING COMMENTS
Pt was seen on rounds this afternoon.  For possible discharge later today.  Team is aware of home situation, and apparently pt needs to be at home in order for her to have a VNS assessment which might result in increased services  (If pt could get 12 hrs of HHA who could remind supervise her meds, we could adjust insulin to work with this).  Her cognitive status seems somewhat improved.  (She is one day off on date)  Glucoses are somewhat improved, now in the low-mid 200 range  Will increase the Lantus to 32, the premeal lispro to 18 units.    Prognosis for compliance at home is guarded at best  Unclear if the pt is actually to leave today, but will assume so.  Follow-up with Dr. Esparza in our division
75 yo F with PMH of T2DM, NSCLC w/ known metastases to brain s/p XRT and on chemo, R hip replacement, RA, CKD, HTN, and HLD. Presented after witnessed fall yesterday. Patient initially unable to recall events of yesterday but when examined and interviewed at bedside later this morning, able to recall that she arrived here yesterday for her radiology appointment and then slipped and fell. Lives alone, says she did not take her insulin yesterday due to having to come in for the appointment, last time she had had her home blood pressure medications was the night before the fall yesterday. Has HHA at home but lives alone.     VS reviewed, initially very hypertensive yesterday but recently much improved     Exam:   Appears comfortable, sitting up eating breakfast    MMM  Normal WOB on RA, CTAB   RRR, soft systolic murmur   Abdomen soft, nontender, nondistended  Extremities warm, 1-2+ pitting edema to the knee on the R   AO self, place, month and year, answering questions appropriately. With some upper extremity rigidity.     Labs reviewed, with improving hyperglycemia this morning     Imaging reviewed, no evidence of stroke     Plan:   -OK for veeg to r/o seizure causing episode but low suspicion given history   -Notify rad-onc of admission given known brain mets, appreciate prior eval from NSGY team, no surgical intervention for mets   -Palliative care to see   -Heme onc involved, to let Dr. Delaney know of admission   -Diabetes educator, emphasize importance of insulin w/ patient given severe hyperglycemia on admission   -Resume home blood pressure medications, monitor BP today to ensure remains in safe range   -Update family (daughter)     Rest as per excellent resident note.
75 yo F with PMH of T2DM, NSCLC w/ known metastases to brain s/p XRT and on chemo, R hip replacement, RA, CKD, HTN, and HLD. Presented after witnessed fall. Patient initially unable to recall events o but when examined and interviewed at bedside later this morning, able to recall that she arrived here yesterday for her radiology appointment and then slipped and fell. Lives alone, says she did not take her insulin yesterday due to having to come in for the appointment, last time she had had her home blood pressure medications was the night before the fall yesterday.     Saw this morning, sitting up in chair, pleasant, no new symptoms.     VS reviewed, BP under much better control now     Exam:   Appears comfortable, sitting up in chair   MMM  Normal WOB on RA, CTAB   RRR, soft systolic murmur   Abdomen soft, nontender, nondistended  Extremities warm, 1-2+ pitting edema to the knee on the R   AOX3, no focal deficits seen, answering questions appropriately     Labs reviewed, with improving hyperglycemia this morning. Cr bump likely secondary to contrast load from initial CTA.     Imaging reviewed, LLE DVT found     Plan:   -Cleared OK for therapeutic anticoagulation from NSGY standpoint given has known brain mets   -Started therapeutic lovenox for DVT   -Endocrine consult to assist with DM recommendations given A1C 12  -BP under control, receiving home meds here without changes made   -Appreciate radiation oncology assistance   -Likely will be optimized for discharge tomorrow, plan to speak with family to inform them of her meds    Rest as per excellent resident note.

## 2024-08-01 NOTE — PROGRESS NOTE ADULT - TIME BILLING
Review of data, insulin adjusmtent for discharge
Bedside exam and interview   Reviewed vitals, labs   Discussed patient's plan of care with housestaff   Documentation of encounter  Excludes teaching and separately reported services
Bedside exam and interview   Reviewed vitals, labs   Discussed patient's plan of care with housestaff   Documentation of encounter  Excludes teaching and separately reported services

## 2024-08-01 NOTE — CONSULT NOTE ADULT - ATTENDING COMMENTS
74F w DM, NSCLC adenocarcinoma with brain mets (Dx 2023), s/p XRT, on chemo, R hip replacement, RA, CKD IIIA, HTN and HLD admitted for fall. UA w 8 wbc, small LE, UCx w >100,000 cfu K variicola (R- Amp, S-rest). ID consulted for DC Abx recs.   On inquiry, she denied dysuria, urgency and frequency. O/E elderly female, NAD, no suprapubic tenderness/CVAT.   Pt has received 3 doses of IV Ceftriaxone, DC Abx.   ID Team 1 will sign off, please reconsult prn.

## 2024-08-01 NOTE — PROGRESS NOTE ADULT - PROBLEM SELECTOR PLAN 10
Home meds based on surescripts: Hydroxychloroquine Sulfate 200 MG BID, sulfasalazine 500mg BID,    Plan:    - holding home meds

## 2024-08-01 NOTE — PHYSICAL EXAM
"Problem: Patient Care Overview  Goal: Plan of Care Review  Outcome: Outcome(s) achieved Date Met: 09/20/18 09/20/18 4148   Coping/Psychosocial   Plan of Care Reviewed With patient;family   Plan of Care Review   Progress improving   OTHER   Outcome Summary Pt up in chair; family members in room. Pt educated on possible home modifications/adaptations of activities (shower chair, removing throw rugs, sitting when dressing). Pt reports no difficulty donning/doffing UB/LB clothing when seated. Reports feeling more comfortable with someone in the room with him during bathing due to fear of falling; educated caregiver and pt on use of sitting in a shower chair/where to buy a shower chair; pt/daughter verbalized understanding. Pt reports transferring in room with no difficulties/\"someone just in the room with me\". Pt has met all OT goals from initial evaluation, showing progress in all areas of ADLs. Recommend d/c from OT services. Pt plans on discharging home with assist/daughter.          " [Normal] : affect appropriate

## 2024-08-01 NOTE — PROGRESS NOTE ADULT - SUBJECTIVE AND OBJECTIVE BOX
SUBJECTIVE / INTERVAL HPI: Patient was seen and examined this morning. Patient reports eating and drinking well. She had a radiation treatment this morning. Discussed with patient about speaking to her family about the possibility of assisted living. Patient denies fevers/chills, chest pain, shortness of breath, abdominal pain, nausea/vomiting, urinary discomfort/frequency, and diarrhea.     CAPILLARY BLOOD GLUCOSE & INSULIN RECEIVED  344 mg/dL (07-31 @ 17:34) --> 14U Lispro + 8U mISS  201 mg/dL (07-31 @ 22:09) --> 25U Lantus + 4U mISS  253 mg/dL (08-01 @ 09:21) --> 14U Lispro + 6U mISS  218 mg/dL (08-01 @ 12:25) --> 4U mISS (Lispro dose missed)      REVIEW OF SYSTEMS  Constitutional:  Negative fever, chills or loss of appetite.  Eyes:  Negative blurry vision or double vision.  Cardiovascular:  Negative for chest pain or palpitations.  Respiratory:  Negative for shortness of breath.    Gastrointestinal:  Negative for nausea, vomiting, diarrhea, constipation, or abdominal pain.  Genitourinary:  Negative frequency or urgency.  Neurologic:  No dizziness, lightheadedness.    PHYSICAL EXAM  Vital Signs Last 24 Hrs  T(C): 37.2 (01 Aug 2024 11:55), Max: 37.7 (01 Aug 2024 06:01)  T(F): 98.9 (01 Aug 2024 11:55), Max: 99.8 (01 Aug 2024 06:01)  HR: 71 (01 Aug 2024 11:55) (63 - 73)  BP: 120/70 (01 Aug 2024 11:55) (120/70 - 161/82)  BP(mean): --  RR: 18 (01 Aug 2024 11:55) (17 - 18)  SpO2: 92% (01 Aug 2024 11:55) (92% - 95%)    Parameters below as of 01 Aug 2024 11:55  Patient On (Oxygen Delivery Method): room air        Constitutional: Awake, alert, in no acute distress.   HEENT: Normocephalic, atraumatic  Respiratory: Lungs clear to ausculation bilaterally.   Cardiovascular: regular rate, irregular rhythm, normal S1 and S2, no audible murmurs.   GI: soft, non-tender, non-distended, bowel sounds present.  Extremities: No lower extremity edema.  Psychiatric: AAO x 3. Normal affect/mood.   Neuro: Impaired memory. Delayed responses to questions    LABS  CBC - WBC/HGB/HTC/PLT: 8.82/11.8/36.7/159 (08-01-24)  BMP - Na/K/Cl/Bicarb/BUN/Cr/Gluc/AG/eGFR: 135/4.0/101/25/24/1.26/196/9/45 (08-01-24)  Ca - 8.2 (08-01-24)  Phos - 1.8 (08-01-24)  Mg - 1.8 (08-01-24)  LFT - Alb/Tprot/Tbili/Dbili/AlkPhos/ALT/AST: 2.6/--/0.2/--/104/13/19 (08-01-24)  PT/aPTT/INR: 11.0/25.4/0.96 (07-29-24)   Thyroid Stimulating Hormone, Serum: 0.598 (06-06-24)  Total T4/Free T4: 7.99/-- (06-06-24)    MEDICATIONS  MEDICATIONS  (STANDING):  atorvastatin 40 milliGRAM(s) Oral at bedtime  cefTRIAXone   IVPB 1000 milliGRAM(s) IV Intermittent every 24 hours  dextrose 5%. 1000 milliLiter(s) (100 mL/Hr) IV Continuous <Continuous>  dextrose 5%. 1000 milliLiter(s) (50 mL/Hr) IV Continuous <Continuous>  dextrose 50% Injectable 12.5 Gram(s) IV Push once  dextrose 50% Injectable 25 Gram(s) IV Push once  dextrose 50% Injectable 25 Gram(s) IV Push once  enoxaparin Injectable 60 milliGRAM(s) SubCutaneous every 24 hours  glucagon  Injectable 1 milliGRAM(s) IntraMuscular once  insulin glargine Injectable (LANTUS) 32 Unit(s) SubCutaneous at bedtime  insulin lispro (ADMELOG) corrective regimen sliding scale   SubCutaneous Before meals and at bedtime  insulin lispro Injectable (ADMELOG) 18 Unit(s) SubCutaneous three times a day before meals  metoprolol succinate  milliGRAM(s) Oral every 24 hours  NIFEdipine XL 60 milliGRAM(s) Oral every 24 hours  nystatin Cream 1 Application(s) Topical every 12 hours  sodium chloride 0.9%. 1000 milliLiter(s) (250 mL/Hr) IV Continuous <Continuous>    MEDICATIONS  (PRN):  dextrose Oral Gel 15 Gram(s) Oral once PRN Blood Glucose LESS THAN 70 milliGRAM(s)/deciliter

## 2024-08-01 NOTE — PROGRESS NOTE ADULT - PROBLEM SELECTOR PLAN 1
Ddx of fall/encephalopathy: mechanical, hypertensive emergency, hyperglycemia, brain metastases, seizure. Lower suspicion for infection or cardiac syncope.   Pt p/w witnessed mechanical fall but no LOC or head strike, AOx0 on admission, BP P 131/85-->223/95-->149/74, FSG >600. Stroke code called in the ED, NIHSS 0, CTH without acute pathology of stroke but notable for brain mets. No leukocytosis, UA with 8 WBC and many bacteria but pt asymptomatic. s/p CTX 1g. On later evaluation, AOx3, pt recalls her fall and claims that she tripped. Neurological exam significant for shuffling and narrow gait, cogwheeling, slow speech, and resting tremors in bilateral hands and chin. vEEG noted mild excess intermittent slow wave activity shifting between hemispheres but no seizure activity.  Per chart review, TTE in 2023 noted normal LV size and systolic function, moderate LVH. MRI in June 2024 significant for 2 new brain metastases.   Of note, pt's compliance with home BP meds and insulin is unclear. Currently unknown where or how patient has been getting her medications (spoke with daughter and called multiple pharmacies, but none have recently dispensed her any medications).     - Neurology consulted - no indication for Parkinson's tx  - Neurosx consulted - ok to start AC for DVTs iso brain mets  - Daily BMP  - f/u PT/OT  - Palliative consulted, appreciate assistance - pt full care/full code Ddx of fall/encephalopathy: mechanical, hypertensive emergency, hyperglycemia, brain metastases, seizure. Lower suspicion for infection or cardiac syncope.   Pt p/w witnessed mechanical fall but no LOC or head strike, AOx0 on admission, BP P 131/85-->223/95-->149/74, FSG >600. Stroke code called in the ED, NIHSS 0, CTH without acute pathology of stroke but notable for brain mets. No leukocytosis, UA with 8 WBC and many bacteria but pt asymptomatic. s/p CTX 1g. On later evaluation, AOx3, pt recalls her fall and claims that she tripped. Neurological exam significant for shuffling and narrow gait, cogwheeling, slow speech, and resting tremors in bilateral hands and chin. vEEG noted mild excess intermittent slow wave activity shifting between hemispheres but no seizure activity.  Per chart review, TTE in 2023 noted normal LV size and systolic function, moderate LVH. MRI in June 2024 significant for 2 new brain metastases.   Of note, pt's compliance with home BP meds and insulin is unclear. Currently unknown where or how patient has been getting her medications (spoke with daughter and called multiple pharmacies, but none have recently dispensed her any medications).     Plan:    - Daily BMP  - Neurology consulted - no indication for Parkinson's tx  - Neurosx consulted - ok to start AC for DVTs iso brain mets  - PT/OT consulted - recommend home PT  - Palliative consulted- pt full care/full code

## 2024-08-01 NOTE — PROGRESS NOTE ADULT - ASSESSMENT
ASSESSMENT / RECOMMENDATIONS  MICHAEL READ is a 74y Female with a past medical history of NSCLC adenocarcinoma c/b brain metastases (dx 2023) s/p XRT, on chemo, T2DM, RA, CKD IIIA, HTN, and HLD now presenting after a witnessed mechanical fall and altered mental status. Endocrinology was consulted for management of poorly controlled T2DM.    A1C: 12.4 %  BUN: 26  Creatinine: 1.48  GFR: 37  Weight: 61.2  BMI: 23.9  EF: 66% (July 2023)    # Type 2 diabetes mellitus with hyperglycemia  Poorly controlled T2DM in the setting of impaired cognition/memory. Patient missing unknown frequency of home insulin doses  - Please increase lantus to 32 units at bedtime, and continue upon discharge  - Increase lispro to 18 units before each meal, and continue upon discharge  - Continue lispro moderate dose sliding scale before meals and at bedtime.  - Patient's fingerstick glucose goal is 100-180 mg/dL.   - Recommend patient and family consider assisted living or additional home health aide support upon discharge to ensure full adherence to all medications  - Patient can follow up at discharge with [facility] by calling [phone] to make an appointment.      Case discussed with Dr. Blackwood. Primary team updated.       Willard Victoria MD  PGY1 Internal Medicine   Endocrinology Service Pager: [phone]

## 2024-08-01 NOTE — PROGRESS NOTE ADULT - SUBJECTIVE AND OBJECTIVE BOX
Physical Medicine and Rehabilitation Progress Note :       Patient is a 74y old  Female who presents with a chief complaint of AMS and fall (01 Aug 2024 05:58)      HPI:  Pt  is a 75 yo F w/ PMH of T2DM, NSCLC adenocarcinoma with mets to brain (Dx 2023), s/p XRT, on chemo, R hip replacement, RA, CKD IIIA, HTN and HLD presenting for witnessed mechanical [facility] Pt does not recall the events leading up to her hospitalization. Per ED note, pt was altered initially and unable to give history, name, date. Pt does report having multiple falls in the past and but still cannot recall what happened. Pt denies all ROS.    In the ED, pt was a stroke code but pt upon neurological examination, pt was AOx3 with no focal neurological deficits does have narrow, shuffling gate.    Vitals: 98.5, -->81 /85-->223/95-->149/74 RR18 SpO2 96%  labs: CBC wnl, FSG >600, troponin 63, Na131, BUN 30 Cr 1.38 glucose 716 BHB 0.5  UA: trace ketones, 8 WBC, numerous bacteria,   CTH: neg for acute process, brain mets  CTA H/N: mild stenosis fo the distal L M1 segment  EKG: NSR with PVCs, QTc 441  Intervention: CTX 1g, labetalol 20mg x2, nifedipine 30mg x1, Kcl 40mg x1  Consults: NSGY, neurology - stroke code (29 Jul 2024 22:58)                            11.8   8.82  )-----------( 159      ( 01 Aug 2024 05:30 )             36.7       08-01    135  |  101  |  24<H>  ----------------------------<  196<H>  4.0   |  25  |  1.26    Ca    8.2<L>      01 Aug 2024 05:30  Phos  1.8     08-01  Mg     1.8     08-01    TPro  5.7<L>  /  Alb  2.6<L>  /  TBili  0.2  /  DBili  x   /  AST  19  /  ALT  13  /  AlkPhos  104  08-01    Vital Signs Last 24 Hrs  T(C): 37.7 (01 Aug 2024 06:01), Max: 37.7 (01 Aug 2024 06:01)  T(F): 99.8 (01 Aug 2024 06:01), Max: 99.8 (01 Aug 2024 06:01)  HR: 63 (01 Aug 2024 06:01) (63 - 73)  BP: 161/82 (01 Aug 2024 06:01) (115/67 - 161/82)  BP(mean): 83 (31 Jul 2024 12:15) (83 - 83)  RR: 17 (01 Aug 2024 06:01) (17 - 19)  SpO2: 95% (01 Aug 2024 06:01) (92% - 95%)    Parameters below as of 01 Aug 2024 06:01  Patient On (Oxygen Delivery Method): room air        MEDICATIONS  (STANDING):  atorvastatin 40 milliGRAM(s) Oral at bedtime  cefTRIAXone   IVPB 1000 milliGRAM(s) IV Intermittent every 24 hours  dextrose 5%. 1000 milliLiter(s) (100 mL/Hr) IV Continuous <Continuous>  dextrose 5%. 1000 milliLiter(s) (50 mL/Hr) IV Continuous <Continuous>  dextrose 50% Injectable 12.5 Gram(s) IV Push once  dextrose 50% Injectable 25 Gram(s) IV Push once  dextrose 50% Injectable 25 Gram(s) IV Push once  enoxaparin Injectable 60 milliGRAM(s) SubCutaneous every 24 hours  glucagon  Injectable 1 milliGRAM(s) IntraMuscular once  insulin glargine Injectable (LANTUS) 32 Unit(s) SubCutaneous at bedtime  insulin lispro (ADMELOG) corrective regimen sliding scale   SubCutaneous Before meals and at bedtime  insulin lispro Injectable (ADMELOG) 18 Unit(s) SubCutaneous three times a day before meals  magnesium sulfate  IVPB 1 Gram(s) IV Intermittent once  metoprolol succinate  milliGRAM(s) Oral every 24 hours  NIFEdipine XL 60 milliGRAM(s) Oral every 24 hours  nystatin Cream 1 Application(s) Topical every 12 hours  sodium chloride 0.9%. 1000 milliLiter(s) (250 mL/Hr) IV Continuous <Continuous>  sodium phosphate 30 milliMole(s)/500 mL IVPB 30 milliMole(s) IV Intermittent once    MEDICATIONS  (PRN):  dextrose Oral Gel 15 Gram(s) Oral once PRN Blood Glucose LESS THAN 70 milliGRAM(s)/deciliter      T(C): 37.7 (08-01-24 @ 06:01), Max: 37.7 (08-01-24 @ 06:01)  HR: 63 (08-01-24 @ 06:01) (63 - 73)  BP: 161/82 (08-01-24 @ 06:01) (115/67 - 161/82)  RR: 17 (08-01-24 @ 06:01) (17 - 19)  SpO2: 95% (08-01-24 @ 06:01) (92% - 95%)        Physical Exam:    on  CONTACT MDRO Urine isolation ,  74 y o woman  lying comfortably in semi Zee's position , awake , alert , no acute complaints , feels tired     Head: normocephalic , atraumatic    Eyes: PERRLA , EOMI , no nystagmus , sclera anicteric    ENT / FACE: neg nasal discharge , uvula midline , no oropharyngeal erythema / exudate    Neck: supple , negative JVD , negative carotid bruits , no thyromegaly    Chest: CTA bilaterally , neg wheeze / rhonchi / rales / crackles / egophany    Cardiovascular: regular rate and rhythm , neg murmurs / rubs / gallops    Abdomen: soft , non distended , no tenderness to palpation in all 4 quadrants ,  normal bowel sounds     Extremities: WWP , neg cyanosis /clubbing / edema     Neurologic Exam:     Alert and oriented to person , place , date/year , speech fluent w/o dysarthria , follows commands     Cranial Nerves:           II:                         pupils equal , round and reactive to light , visual fields intact         III/ IV/VI:             extraocular movements intact , neg nystagmus , neg ptosis        V:                        facial sensation intact , V1-3 normal        VII:                      face symmetric , no droop , normal eye closure and smile        VIII:                     hearing intact to finger rub bilaterally        IX and X:             no hoarseness , gag intact , palate/ uvula rise symmetrically        XI:                       SCM / trapezius strength intact bilateral        XII:                      no tongue deviation    Motor Exam:        > 3+/5 x 4 extremities , without drift     Sensation:         intact to light touch x 4 extremities                            no neglect or extinction on double simultaneous testing    DTR:           biceps/brachioradialis: equal                            patella/ankle: equal          neg Babinski         Coordination:            Finger to Nose:  neg dysmetria bilaterally        Initial Functional Status Assessment :       Previous Level of Function:     · Ambulation Skills	needs device and assist  · Transfer Skills	needs device and assist  · ADL Skills	needs device and assist  · Work/Leisure Activity	needs device and assist  · Additional Comments	Pt lives in an apartment alone, no stairs to enter, has HHA x 2 hours a day x 5 days per week. Pt ambulates with rollator at baseline.    Cognitive Status Examination:   · Orientation	oriented to person, place, time and situation  · Level of Consciousness	alert  · Follows Commands and Answers Questions	100% of the time  · Personal Safety and Judgment	intact  · Short Term Memory	intact  · Long Term Memory	intact    Range of Motion Exam:   · Range of Motion Examination	bilateral upper extremity ROM was WFL (within functional limits); bilateral lower extremity ROM was WFL (within functional limits)    Manual Muscle Testing:   · Manual Muscle Testing Results	>3+5/MMT BUE and BLE    Bed Mobility: Supine to Sit:     · Level of Leslie	minimum assist (75% patients effort)  · Physical Assist/Nonphysical Assist	1 person assist    Bed Mobility Analysis:     · Impairments Contributing to Impaired Bed Mobility	impaired balance; decreased strength    Transfer: Sit to Stand:     · Level of Leslie	minimum assist (75% patients effort)  · Physical Assist/Nonphysical Assist	1 person assist  · Weight-Bearing Restrictions	weight-bearing as tolerated  · Assistive Device	rolling walker    Transfer: Stand to Sit:     · Level of Leslie	minimum assist (75% patients effort)  · Physical Assist/Nonphysical Assist	1 person assist  · Weight-Bearing Restrictions	weight-bearing as tolerated  · Assistive Device	rolling walker    Sit/Stand Transfer Safety Analysis:     · Impairments Contributing to Impaired Transfers	impaired balance; decreased strength    Gait Skills:     · Level of Leslie	minimum assist (75% patients effort)  · Physical Assist/Nonphysical Assist	1 person assist  · Weight-Bearing Restrictions	weight-bearing as tolerated  · Assistive Device	rolling walker  · Gait Distance	10 feet    Gait Analysis:     · Gait Pattern Used	3-point gait  · Impairments Contributing to Gait Deviations	impaired balance; decreased strength    Balance Skills Assessment:     · Sitting Balance: Static	fair balance  · Sitting Balance: Dynamic	fair balance  · Sit-to-Stand Balance	fair minus  · Standing Balance: Static	fair minus  · Standing Balance: Dynamic	fair minus  · Systems Impairment Contributing to Balance Disturbance	musculoskeletal  · Identified Impairments Contributing to Balance Disturbance	decreased strength    Clinical Impressions:   · Criteria for Skilled Therapeutic Interventions	predicted duration of therapy intervention; impairments found; functional limitations in following categories; anticipated equipment needs at discharge; risk reduction/prevention; anticipated discharge recommendation; rehab potential; therapy frequency  · Impairments Found (describe specific impairments)	aerobic capacity/endurance; gait, locomotion, and balance; gross motor; fine motor; ergonomics and body mechanics; integumentary integrity; joint integrity and mobility; muscle strength  · Functional Limitations in Following Categories (describe specific limitations)	self-care; home management; community/leisure  · Risk Reduction/Prevention (Describe Specific Areas of risk reduction/prevention)	risk factors  · Risk Areas	fall              PM&R Impression : as above    Current disposition plan recommendation :     d/c home with home physical therapy

## 2024-08-01 NOTE — CHART NOTE - NSCHARTNOTEFT_GEN_A_CORE
Endocrine following for Type 2 Diabetes       MICHAEL READ is a 74y Female with a past medical history of NSCLC adenocarcinoma c/b brain metastases (dx 2023) s/p XRT, on chemo, T2DM, RA, CKD IIIA, HTN, and HLD now presenting after a witnessed mechanical fall and altered mental status. Endocrinology was consulted for management of poorly controlled T2DM.    # Type 2 diabetes mellitus with hyperglycemia  - Patient is known to endocrine service, has recurrent admissions for poorly controlled T2DM in the setting of impaired cognition/memory. Patient missing unknown frequency of home insulin doses. Has HHA for only 2hrs a day at home, daughter lives an hr away in [place]. Considered assisted living facility however patient herself has denied in the past. Difficult to determine insulin regimen given lack of social help at home with all medications and attending appointments. She is also unreliable historian when it comes to eating habits.   - She was discharged last visit on Lantus 18U, Novolog 9U, likely not taking   - FS > 200s, possibly glucotoxic still, requirement of insulin significantly higher then previous visits.   - Please increase lantus to 32 units at bedtime.   - Increase lispro to 18 units before each meal TID.   - Continue lispro moderate dose sliding scale before meals and at bedtime.  - Can be discharged on the same regimen.   - Patient's fingerstick glucose goal is 100-180 mg/dL.   - Recommend VNS assessment at home for increase in hrs for home health aid for closer supervision/ medication assistance.   - Patient can follow up at discharge with [facility] by calling [phone] to make an appointment.      Case discussed with Dr. Blackwood. Primary team updated.       Rukhsana Hartman  Endocrinology Fellow   Endocrinology Service Pager: [phone]

## 2024-08-01 NOTE — PROGRESS NOTE ADULT - PROBLEM SELECTOR PLAN 7
Follows with Dr. Delaney at [facility]. Pt is receiving both chemo and radiation therapy. Last session for chemo 06/2024. Pt planned to be taken for radiation therapy today.    Plan:  - Rad/onc consulted  - Heme/onc consulted:       --Please consider starting steroids to control the rebound edema from SRS.       --Plan to re-start her chemotherapy in one week, no other oncological work up inpatient.  - Zofran PRN for nausea

## 2024-08-01 NOTE — PROGRESS NOTE ADULT - PROBLEM SELECTOR PLAN 2
Baseline Cr ~1.2. On admission Cr 1.38, elevated likely 2/2 hypertensive emergency, Cr downtrended to 1.1 s/p 2L NS.   However, Cr elevated to 1.48 on 7/31, suspect IV contrast-induced nephropathy (iso CTA H/N on 7/29)    - 1L NS over 5 hrs  - CTM BMP

## 2024-08-01 NOTE — PROGRESS NOTE ADULT - PROBLEM SELECTOR PLAN 8
Pt endorses compliance with insulin regimen but in ED blood glucose >700, BHB 0.5. Per chart review, pt has had ED visits in the past with hyperglycemia. Low concern for DKA as ABG w/o acidosis or anion gap. s/p 15U insulin in the ED and FSG improved to 289. Pt is asymptomatic.     Plan:    - Plan as above Pt endorses compliance with insulin regimen but in ED blood glucose >700, BHB 0.5. Per chart review, pt has had ED visits in the past with hyperglycemia. Low concern for DKA as ABG w/o acidosis or anion gap. s/p 15U insulin in the ED and FSG improved to 289. Pt is asymptomatic.     Plan:  RESOLVED  - Plan as above

## 2024-08-01 NOTE — PROGRESS NOTE ADULT - PROBLEM SELECTOR PLAN 5
Pt with right sided 2+ pitting edema to knee, no edema on L, 2+ DP pulses. Doppler found DVT of LEFT LE (non-edematous) but negative for DVT on Right LE (edematous). Per neurosurgery, ok to therapeutically anticoagulate despite brain metastases.    Plan:  - subQ Lovenox 60mg q24h (given DUNIA)

## 2024-08-01 NOTE — PROGRESS NOTE ADULT - PROBLEM SELECTOR PLAN 11
Fluids: none  Electrolytes: Mg >2, K >4  Nutrition: consistent carb  Prophylaxis: lovenox full AC  Activity: PT/OT eval  GI: none  C: FC/FC  Dispo: [facility] Fluids: none  Electrolytes: Mg >2, K >4  Nutrition: consistent carb  Prophylaxis: lovenox 60mg daily  Activity: PT/OT eval  GI: none  C: FC/FC  Dispo: [facility]

## 2024-08-01 NOTE — PROGRESS NOTE ADULT - PROBLEM SELECTOR PLAN 6
Hypertensive urgency resolved (admission /95, EKG NSR with PVCs, S/p labetalol 20mg x2, nifedipine 30mg x1).  Home meds based on HIEE and surescripts: Amlodipine 10mg qd, Metoprolol Succinate ER 100mg qd, nifedipine ER 60mg qd    Plan:  - c/w metoprolol 100mg qd  - c/w nifedipine ER 60mg qd  - hold amlodipine

## 2024-08-01 NOTE — CONSULT NOTE ADULT - SUBJECTIVE AND OBJECTIVE BOX
Consultation Requested by:    Patient is a 74y old  Female who presents with a chief complaint of AMS and fall (01 Aug 2024 14:56)    HPI:  Pt  is a 75 yo F w/ PMH of T2DM, NSCLC adenocarcinoma with mets to brain (Dx 2023), s/p XRT, on chemo, R hip replacement, RA, CKD IIIA, HTN and HLD presenting for witnessed mechanical fall while waiting for elevator at [address] [place] [address]. Pt does not recall the events leading up to her hospitalization. Per ED note, pt was altered initially and unable to give history, name, date. Pt does report having multiple falls in the past and but still cannot recall what happened. Pt denies all ROS.    In the ED, pt was a stroke code but pt upon neurological examination, pt was AOx3 with no focal neurological deficits does have narrow, shuffling gate.    Vitals: 98.5, -->81 /85-->223/95-->149/74 RR18 SpO2 96%  labs: CBC wnl, FSG >600, troponin 63, Na131, BUN 30 Cr 1.38 glucose 716 BHB 0.5  UA: trace ketones, 8 WBC, numerous bacteria,   CTH: neg for acute process, brain mets  CTA H/N: mild stenosis fo the distal L M1 segment  EKG: NSR with PVCs, QTc 441  Intervention: CTX 1g, labetalol 20mg x2, nifedipine 30mg x1, Kcl 40mg x1  Consults: NSGY, neurology - stroke code (29 Jul 2024 22:58)  -----------------------------  Subjective: Patient feels well. Does report urinary frequency. No pets, smoking, drinking or recreational drug use.     Allergies    citrus (Urticaria)  Bactrim (Rash)    Intolerances      Antimicrobials:  cefTRIAXone   IVPB 1000 milliGRAM(s) IV Intermittent every 24 hours      Other Medications:  atorvastatin 40 milliGRAM(s) Oral at bedtime  dextrose 5%. 1000 milliLiter(s) IV Continuous <Continuous>  dextrose 5%. 1000 milliLiter(s) IV Continuous <Continuous>  dextrose 50% Injectable 25 Gram(s) IV Push once  dextrose 50% Injectable 25 Gram(s) IV Push once  dextrose 50% Injectable 12.5 Gram(s) IV Push once  dextrose Oral Gel 15 Gram(s) Oral once PRN  enoxaparin Injectable 60 milliGRAM(s) SubCutaneous every 24 hours  glucagon  Injectable 1 milliGRAM(s) IntraMuscular once  insulin glargine Injectable (LANTUS) 32 Unit(s) SubCutaneous at bedtime  insulin lispro (ADMELOG) corrective regimen sliding scale   SubCutaneous Before meals and at bedtime  insulin lispro Injectable (ADMELOG) 18 Unit(s) SubCutaneous three times a day before meals  metoprolol succinate  milliGRAM(s) Oral every 24 hours  NIFEdipine XL 60 milliGRAM(s) Oral every 24 hours  nystatin Cream 1 Application(s) Topical every 12 hours  sodium chloride 0.9%. 1000 milliLiter(s) IV Continuous <Continuous>      FAMILY HISTORY:  No pertinent family history in first degree relatives      PAST MEDICAL & SURGICAL HISTORY:  HTN (hypertension)      HLD (hyperlipidemia)      DM (diabetes mellitus), type 2      Rheumatoid arthritis      Stage 4 chronic kidney disease      Degenerative joint disease (DJD) of lumbar spine      Osteopenia      Lung cancer metastatic to brain      S/P total right hip arthroplasty        SOCIAL HISTORY:    IMMUNIZATIONS  [] Up to Date		[] Not Up to Date:  Recent Immunizations:	[] No	[] Yes:    Daily     Daily   Head Circumference:  Vital Signs Last 24 Hrs  T(C): 37.2 (01 Aug 2024 11:55), Max: 37.7 (01 Aug 2024 06:01)  T(F): 98.9 (01 Aug 2024 11:55), Max: 99.8 (01 Aug 2024 06:01)  HR: 71 (01 Aug 2024 11:55) (63 - 73)  BP: 120/70 (01 Aug 2024 11:55) (120/70 - 161/82)  BP(mean): --  RR: 18 (01 Aug 2024 11:55) (17 - 18)  SpO2: 92% (01 Aug 2024 11:55) (92% - 95%)    Parameters below as of 01 Aug 2024 11:55  Patient On (Oxygen Delivery Method): room air        PHYSICAL EXAM  T(C): 37.2 (08-01-24 @ 11:55), Max: 37.7 (08-01-24 @ 06:01)  HR: 71 (08-01-24 @ 11:55) (63 - 73)  BP: 120/70 (08-01-24 @ 11:55) (120/70 - 161/82)  RR: 18 (08-01-24 @ 11:55) (17 - 18)  SpO2: 92% (08-01-24 @ 11:55) (92% - 95%)    General: NAD, laying in bed, speaking in full sentences  Cardio: RRR, +S1/S2, no M/R/G  Resp: lungs CTAB, no cough/wheezes/rales/rhonchi  Abdo: soft, NT, ND, +bowel sounds  Extremities: WWP, no edema/cyanosis/clubbing   Vasc: 2+ radial and DP pulses b/l  Neuro: A&Ox3  Skin: dry, intact, no visible jaundice   MSK: no joint swelling      Lab Results:                        11.8   8.82  )-----------( 159      ( 01 Aug 2024 05:30 )             36.7     08-01    135  |  101  |  24<H>  ----------------------------<  196<H>  4.0   |  25  |  1.26    Ca    8.2<L>      01 Aug 2024 05:30  Phos  1.8     08-01  Mg     1.8     08-01    TPro  5.7<L>  /  Alb  2.6<L>  /  TBili  0.2  /  DBili  x   /  AST  19  /  ALT  13  /  AlkPhos  104  08-01    LIVER FUNCTIONS - ( 01 Aug 2024 05:30 )  Alb: 2.6 g/dL / Pro: 5.7 g/dL / ALK PHOS: 104 U/L / ALT: 13 U/L / AST: 19 U/L / GGT: x             Urinalysis Basic - ( 01 Aug 2024 05:30 )    Color: x / Appearance: x / SG: x / pH: x  Gluc: 196 mg/dL / Ketone: x  / Bili: x / Urobili: x   Blood: x / Protein: x / Nitrite: x   Leuk Esterase: x / RBC: x / WBC x   Sq Epi: x / Non Sq Epi: x / Bacteria: x        MICROBIOLOGY    [] Pathology slides reviewed and/or discussed with pathologist  [] Microbiology findings discussed with microbiologist or slides reviewed  [] Images erviewed with radiologist  [] Case discussed with an attending physician in addition to the patient's primary physician  [] Records, reports from outside [facility] reviewed    [] Patient requires continued monitoring for:  [] Total time spent by attending physician: __ minutes, excluding procedure time.

## 2024-08-01 NOTE — PROGRESS NOTE ADULT - ASSESSMENT
Pt  is a 73 yo F w/ PMH of T2DM, NSCLC adenocarcinoma with mets to brain (Dx 2023), s/p XRT currently on weekly chemo, R hip replacement, RA, CKD IIIA, HTN and HLD who presents after witnessed mechanical fall, admitted for AMS, hypertensive urgency, and hyperglycemia (FSG>600).

## 2024-08-03 PROBLEM — C34.90 MALIGNANT NEOPLASM OF UNSPECIFIED PART OF UNSPECIFIED BRONCHUS OR LUNG: Chronic | Status: ACTIVE | Noted: 2024-07-29

## 2024-08-05 ENCOUNTER — NON-APPOINTMENT (OUTPATIENT)
Age: 74
End: 2024-08-05

## 2024-08-05 ENCOUNTER — TRANSCRIPTION ENCOUNTER (OUTPATIENT)
Age: 74
End: 2024-08-05

## 2024-08-05 ENCOUNTER — APPOINTMENT (OUTPATIENT)
Dept: INTERNAL MEDICINE | Facility: CLINIC | Age: 74
End: 2024-08-05

## 2024-08-05 ENCOUNTER — APPOINTMENT (OUTPATIENT)
Dept: HOME HEALTH SERVICES | Facility: HOME HEALTH | Age: 74
End: 2024-08-05

## 2024-08-05 PROCEDURE — 99350 HOME/RES VST EST HIGH MDM 60: CPT

## 2024-08-05 RX ORDER — LIDOCAINE/BENZALKONIUM/ALCOHOL
1 SOLUTION, NON-ORAL TOPICAL ONCE
Refills: 0 | Status: COMPLETED | OUTPATIENT
Start: 2024-08-22 | End: 2024-08-22

## 2024-08-05 RX ORDER — GEMCITABINE 10 MG/ML
1700 INJECTION, SOLUTION INTRAVENOUS ONCE
Refills: 0 | Status: COMPLETED | OUTPATIENT
Start: 2024-08-22 | End: 2024-08-22

## 2024-08-05 RX ORDER — SODIUM CHLORIDE 9 MG/ML
10 INJECTION INTRAMUSCULAR; INTRAVENOUS; SUBCUTANEOUS ONCE
Refills: 0 | Status: COMPLETED | OUTPATIENT
Start: 2024-08-22 | End: 2024-08-22

## 2024-08-05 RX ORDER — PALONOSETRON HYDROCHLORIDE 0.25 MG/5ML
0.25 INJECTION INTRAVENOUS ONCE
Refills: 0 | Status: COMPLETED | OUTPATIENT
Start: 2024-08-22 | End: 2024-08-22

## 2024-08-05 NOTE — DATA REVIEWED
Bedside shift change report given to Rere Rapp RN (oncoming nurse) by Manuelito Martin (offgoing nurse). Report included the following information SBAR, Procedure Summary, Intake/Output, MAR, Recent Results and Cardiac Rhythm Sinus tachy. [No studies available for review at this time.] : No studies available for review at this time.

## 2024-08-06 ENCOUNTER — NON-APPOINTMENT (OUTPATIENT)
Age: 74
End: 2024-08-06

## 2024-08-06 NOTE — REVIEW OF SYSTEMS
[Lower Ext Edema] : lower extremity edema [Joint Pain] : joint pain [Unsteady Walking] : ataxia [Negative] : Heme/Lymph [Chest Pain] : no chest pain [Palpitations] : no palpitations [Leg Claudication] : no leg claudication [Orthopnea] : no orthopnea [Paroxysmal Nocturnal Dyspnea] : no paroxysmal nocturnal dyspnea [Joint Stiffness] : no joint stiffness [Joint Swelling] : no joint swelling [Muscle Weakness] : no muscle weakness [Muscle Pain] : no muscle pain [Back Pain] : no back pain [Headache] : no headache [Dizziness] : no dizziness [Fainting] : no fainting [Confusion] : no confusion [Memory Loss] : no memory loss [FreeTextEntry5] : b/l lower extremities edema +3 [FreeTextEntry9] : OA with joint pain [de-identified] : using rollator at home and outside, mobility impairment

## 2024-08-06 NOTE — REVIEW OF SYSTEMS
[Lower Ext Edema] : lower extremity edema [Joint Pain] : joint pain [Unsteady Walking] : ataxia [Negative] : Heme/Lymph [Chest Pain] : no chest pain [Palpitations] : no palpitations [Leg Claudication] : no leg claudication [Orthopnea] : no orthopnea [Paroxysmal Nocturnal Dyspnea] : no paroxysmal nocturnal dyspnea [Joint Stiffness] : no joint stiffness [Joint Swelling] : no joint swelling [Muscle Weakness] : no muscle weakness [Muscle Pain] : no muscle pain [Back Pain] : no back pain [Headache] : no headache [Dizziness] : no dizziness [Fainting] : no fainting [Confusion] : no confusion [Memory Loss] : no memory loss [FreeTextEntry5] : b/l lower extremities edema +3 [FreeTextEntry9] : OA with joint pain [de-identified] : using rollator at home and outside, mobility impairment

## 2024-08-06 NOTE — COUNSELING
[Overweight - ( BMI 25.1 - 29.9 )] : overweight -  ( BMI 25.1 - 29.9 ) [DASH diet recommended] : DASH diet recommended [] : foot exam [Patient not on disease-modifying anti-rheumatic drug due to overall prognosis] : Patient not on disease-modifying anti-rheumatic drug due to overall prognosis [Completed] : Aspirin use discussion completed [Obese (BMI >29.9)] : Obese - BMI >29.9 [DASH diet given] : DASH diet given [Sodium restriction 2gm recommended] : Sodium restriction 2 gm recommended [Non - Smoker] : non-smoker [Use assistive device to avoid falls] : use assistive device to avoid falls [Remove clutter and unsafe carpeting to avoid falls] : remove clutter and unsafe carpeting to avoid falls [Use grab bars] : use grab bars [Smoke/CO Detectors] : smoke/CO detectors [Bone Density] : Bone Density [Mammogram] : Mammogram [Lung Cancer Screening in qualified smokers] : Lung Canser Screening [Colon Cancer Screening] : Colon Cancer Screening [Improve mobility] : improve mobility [Improve pain control] : improve pain control [Decrease hospital use] : decrease hospital use [Minimize unnecessary interventions] : minimize unnecessary interventions [Discussed disease trajectory with patient/caregiver] : discussed disease trajectory with patient/caregiver [Full Code] : Code Status: Full Code [No Limitations] : Treatment Guidelines: No limitations [Long Term Intubation] : Intubation: Long term intubation [FreeTextEntry3] : renal diet [FreeTextEntry2] : advice need for medical alert

## 2024-08-06 NOTE — PHYSICAL EXAM
[No Acute Distress] : no acute distress [Normal Voice/Communication] : normal voice communication [Normal Sclera/Conjunctiva] : normal sclera/conjunctiva [EOMI] : extra ocular movement intact [Normal Outer Ear/Nose] : the ears and nose were normal in appearance [Normal Oropharynx] : the oropharynx was normal [Normal TMs] : both tympanic membranes were normal [No JVD] : no jugular venous distention [No LAD] : no lymphadenopathy [No Respiratory Distress] : no respiratory distress [Clear to Auscultation] : lungs were clear to auscultation bilaterally [No Accessory Muscle Use] : no accessory muscle use [Normal Rate] : heart rate was normal  [Regular Rhythm] : with a regular rhythm [Normal S1, S2] : normal S1 and S2 [No Murmurs] : no murmurs heard [Breast Exam Declined] : patient declined to have breast exam done [Normal Bowel Sounds] : normal bowel sounds [Non Tender] : non-tender [Soft] : abdomen soft [Not Distended] : not distended [Patient Refused] : rectal exam was refused by the patient [Normal Post Cervical Nodes] : no posterior cervical lymphadenopathy [Normal Anterior Cervical Nodes] : no anterior cervical lymphadenopathy [No CVA Tenderness] : no ~M costovertebral angle tenderness [No Rash] : no rash [Cranial Nerves Intact] : cranial nerves 2-12 were intact [Oriented x3] : oriented to person, place, and time [de-identified] : patient refused [Over the Past 2 Weeks, Have You Felt Down, Depressed, or Hopeless?] : 1.) Over the past 2 weeks, have you felt down, depressed, or hopeless? No [Over the Past 2 Weeks, Have You Felt Little Interest or Pleasure Doing Things?] : 2.) Over the past 2 weeks, have you felt little interest or pleasure doing things? No [Foot Ulcers] : no foot ulcers [de-identified] : female patient walking with unsteady gait, slow speech, confused easily, avoiding answering phone, frsutrated with multiple calls [de-identified] : mildly tachycardic up to 110s, then down to 90s, irregularly irregular [de-identified] : obese [de-identified] : unsteady gait, [de-identified] : confused, pleasant, alert but holding head when trying to think

## 2024-08-06 NOTE — REASON FOR VISIT
[Post-hospitalization from ___ Hospital] : Post-hospitalization from [unfilled] Hospital [Admitted on: ___] : The patient was admitted on [unfilled] [Discharged on ___] : discharged on [unfilled] [Patient Contacted By: ____] : and contacted by [unfilled] [Post Hospitalization] : a post hospitalization visit [Formal Caregiver] : formal caregiver [Pre-Visit Preparation] : pre-visit preparation was done [Intercurrent Specialty/Sub-specialty Visits] : the patient has intercurrent specialty/sub-specialty visits [FreeTextEntry2] : chart reviewed [FreeTextEntry3] :     hematology, ONCOLOGY, CARDIOLOGY, endocrinology,

## 2024-08-06 NOTE — HEALTH RISK ASSESSMENT
[Any fall with injury in past year] : Patient reported fall with injury in the past year [Yes] : The patient has visual impairment [HRA Reviewed] : Health risk assessment reviewed [Independent] : using telephone [Some assistance needed] : managing finances [facility] [FreeTextEntry2] : use rollator in and outside home, mobility impairment, multiple falls.  [FreeTextEntry8] : using rollator in the home and outside

## 2024-08-06 NOTE — HEALTH RISK ASSESSMENT
[Any fall with injury in past year] : Patient reported fall with injury in the past year [Yes] : The patient has visual impairment [HRA Reviewed] : Health risk assessment reviewed [Independent] : using telephone [Some assistance needed] : managing finances [facility] [FreeTextEntry2] : use rollator in and outside home, mobility impairment, multiple falls.  [FreeTextEntry8] : using rollator in the home and outside

## 2024-08-06 NOTE — PHYSICAL EXAM
[No Acute Distress] : no acute distress [Normal Voice/Communication] : normal voice communication [Normal Sclera/Conjunctiva] : normal sclera/conjunctiva [EOMI] : extra ocular movement intact [Normal Outer Ear/Nose] : the ears and nose were normal in appearance [Normal Oropharynx] : the oropharynx was normal [Normal TMs] : both tympanic membranes were normal [No JVD] : no jugular venous distention [No LAD] : no lymphadenopathy [No Respiratory Distress] : no respiratory distress [Clear to Auscultation] : lungs were clear to auscultation bilaterally [No Accessory Muscle Use] : no accessory muscle use [Normal Rate] : heart rate was normal  [Regular Rhythm] : with a regular rhythm [Normal S1, S2] : normal S1 and S2 [No Murmurs] : no murmurs heard [Breast Exam Declined] : patient declined to have breast exam done [Normal Bowel Sounds] : normal bowel sounds [Non Tender] : non-tender [Soft] : abdomen soft [Not Distended] : not distended [Patient Refused] : rectal exam was refused by the patient [Normal Post Cervical Nodes] : no posterior cervical lymphadenopathy [Normal Anterior Cervical Nodes] : no anterior cervical lymphadenopathy [No CVA Tenderness] : no ~M costovertebral angle tenderness [No Rash] : no rash [Cranial Nerves Intact] : cranial nerves 2-12 were intact [Oriented x3] : oriented to person, place, and time [de-identified] : patient refused [Over the Past 2 Weeks, Have You Felt Down, Depressed, or Hopeless?] : 1.) Over the past 2 weeks, have you felt down, depressed, or hopeless? No [Over the Past 2 Weeks, Have You Felt Little Interest or Pleasure Doing Things?] : 2.) Over the past 2 weeks, have you felt little interest or pleasure doing things? No [Foot Ulcers] : no foot ulcers [de-identified] : female patient walking with unsteady gait, slow speech, confused easily, avoiding answering phone, frsutrated with multiple calls [de-identified] : mildly tachycardic up to 110s, then down to 90s, irregularly irregular [de-identified] : obese [de-identified] : unsteady gait, [de-identified] : confused, pleasant, alert but holding head when trying to think

## 2024-08-06 NOTE — HISTORY OF PRESENT ILLNESS
[House Calls Co-Management Patient] : [unfilled] is a House Calls co-management patient [In-Place] : has aide services in-place [Patient is stable] : patient is stable [Education provided] : education provided [Patient] : patient [Formal Caregiver] : formal caregiver [LastPCPVisitDate] : 05/24 [FreeTextEntry4] : hematology, ONCOLOGY, CARDIOLOGY, endocrinology, RHEUMATOLOGY [FreeTextEntry1] : Not homebound [FreeTextEntry2] :  08/05/2024 COVID SCREEN:Patient or caretaker denies fever, cough, trouble breathing, rash, vomiting. Patient has not been in close contact with anyone who is COVID-19 positive, or suspected of having COVID-19.   N95 mask, gloves, eye wear and gown (if indicated) used during visit: Yes.    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  Patient reports recent hospitalization which was non HIE alerts to [facility] 05/06/24 following a fall. Hospital diagnosis closed fracture of right orbital floor and maxilla. Was discharged to [facility] for rehab on 05/08/24. Patient is now discharged home on 06/05/24.  Home since Thursday. Feeling overwhelmed with phone calls, has not been answering phone. HHA 2 hrs/day.  Awaiting more hours. Agencies have been unable to reach her. Taking meds, but on review of bottles: 1/6 cefpodoxime taken (shoud be completed by now) 3.5 doses Eliquis taken (should have had 7 by now). Pt denies new CP, SOB, palpitations, states she feels unsteady on feet Denies dizziness. +increased thirst + increased urination Dtr contacted after hospital stay, states pt has left door open at night, had someone try to break in. Worried about pt's safety at home. However concern is that she can't get chemo if in rehab. Already missed a few weeks when in rehab after fx in May.  Patient denies chest pain, palpitation, discomfort, distress, dizziness, headache and n/v.  Medication updated  Patient/ patient's caregiver reports no weight loss >10 lbs in the past 6 months. No changes in dentition or swallowing reported, No changes in hearing or vision reported. More confused lately. Patient denies any symptoms of depression or anxiety. Patient is ADL independent/dependent and IADL independent/dependent. 2 falls in last 3 months  Patient's home environment is safe.  Goals of care discussed 10min. Full Code with no limitations.

## 2024-08-06 NOTE — HISTORY OF PRESENT ILLNESS
[House Calls Co-Management Patient] : [unfilled] is a House Calls co-management patient [In-Place] : has aide services in-place [Patient is stable] : patient is stable [Education provided] : education provided [Patient] : patient [Formal Caregiver] : formal caregiver [LastPCPVisitDate] : 05/24 [FreeTextEntry4] : hematology, ONCOLOGY, CARDIOLOGY, endocrinology, RHEUMATOLOGY [FreeTextEntry1] : Not homebound [FreeTextEntry2] :  08/05/2024 COVID SCREEN:Patient or caretaker denies fever, cough, trouble breathing, rash, vomiting. Patient has not been in close contact with anyone who is COVID-19 positive, or suspected of having COVID-19.   N95 mask, gloves, eye wear and gown (if indicated) used during visit: Yes.    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  Patient reports recent hospitalization which was non HIE alerts to [facility] 05/06/24 following a fall. Hospital diagnosis closed fracture of right orbital floor and maxilla. Was discharged to [facility] for rehab on 05/08/24. Patient is now discharged home on 06/05/24.  Home since Thursday. Feeling overwhelmed with phone calls, has not been answering phone. HHA 2 hrs/day.  Awaiting more hours. Agencies have been unable to reach her. Taking meds, but on review of bottles: 1/6 cefpodoxime taken (shoud be completed by now) 3.5 doses Eliquis taken (should have had 7 by now). Pt denies new CP, SOB, palpitations, states she feels unsteady on feet Denies dizziness. +increased thirst + increased urination Dtr contacted after hospital stay, states pt has left door open at night, had someone try to break in. Worried about pt's safety at home. However concern is that she can't get chemo if in rehab. Already missed a few weeks when in rehab after fx in May.  Patient denies chest pain, palpitation, discomfort, distress, dizziness, headache and n/v.  Medication updated  Patient/ patient's caregiver reports no weight loss >10 lbs in the past 6 months. No changes in dentition or swallowing reported, No changes in hearing or vision reported. More confused lately. Patient denies any symptoms of depression or anxiety. Patient is ADL independent/dependent and IADL independent/dependent. 2 falls in last 3 months  Patient's home environment is safe.  Goals of care discussed 10min. Full Code with no limitations.

## 2024-08-07 ENCOUNTER — APPOINTMENT (OUTPATIENT)
Dept: HEMATOLOGY ONCOLOGY | Facility: CLINIC | Age: 74
End: 2024-08-07

## 2024-08-07 ENCOUNTER — APPOINTMENT (OUTPATIENT)
Dept: INFUSION THERAPY | Facility: CLINIC | Age: 74
End: 2024-08-07

## 2024-08-07 ENCOUNTER — APPOINTMENT (OUTPATIENT)
Dept: HOME HEALTH SERVICES | Facility: HOME HEALTH | Age: 74
End: 2024-08-07

## 2024-08-08 ENCOUNTER — APPOINTMENT (OUTPATIENT)
Dept: HOME HEALTH SERVICES | Facility: HOME HEALTH | Age: 74
End: 2024-08-08

## 2024-08-08 ENCOUNTER — NON-APPOINTMENT (OUTPATIENT)
Age: 74
End: 2024-08-08

## 2024-08-08 PROCEDURE — 99350 HOME/RES VST EST HIGH MDM 60: CPT

## 2024-08-10 PROBLEM — I82.409 DVT (DEEP VENOUS THROMBOSIS): Status: ACTIVE | Noted: 2024-08-10

## 2024-08-10 PROBLEM — I49.9 IRREGULAR HEARTBEAT: Status: ACTIVE | Noted: 2024-08-06

## 2024-08-10 PROBLEM — R41.89 BRAIN FOG: Status: ACTIVE | Noted: 2024-08-10

## 2024-08-10 NOTE — REVIEW OF SYSTEMS
[Lower Ext Edema] : lower extremity edema [Joint Pain] : joint pain [Unsteady Walking] : ataxia [Negative] : Heme/Lymph [Chest Pain] : no chest pain [Palpitations] : no palpitations [Leg Claudication] : no leg claudication [Orthopnea] : no orthopnea [Paroxysmal Nocturnal Dyspnea] : no paroxysmal nocturnal dyspnea [Joint Stiffness] : no joint stiffness [Joint Swelling] : no joint swelling [Muscle Weakness] : no muscle weakness [Muscle Pain] : no muscle pain [Back Pain] : no back pain [Headache] : no headache [Dizziness] : no dizziness [Fainting] : no fainting [Confusion] : no confusion [Memory Loss] : no memory loss [FreeTextEntry5] : b/l lower extremities edema [FreeTextEntry9] : OA with joint pain [de-identified] : using rollator at home and outside, mobility impairment

## 2024-08-10 NOTE — HISTORY OF PRESENT ILLNESS
[House Calls Co-Management Patient] : [unfilled] is a House Calls co-management patient [In-Place] : has aide services in-place [Patient is stable] : patient is stable [Education provided] : education provided [Patient] : patient [Formal Caregiver] : formal caregiver [LastPCPVisitDate] : 05/24 [FreeTextEntry4] : hematology, ONCOLOGY, CARDIOLOGY, endocrinology, RHEUMATOLOGY [FreeTextEntry1] : Not homebound [FreeTextEntry2] :  08/08/2024 COVID SCREEN:Patient or caretaker denies fever, cough, trouble breathing, rash, vomiting. Patient has not been in close contact with anyone who is COVID-19 positive, or suspected of having COVID-19.   N95 mask, gloves, eye wear and gown (if indicated) used during visit: Yes.    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 Hospital diagnosis closed fracture of right orbital floor and maxilla. Was discharged to [facility] for rehab on 05/08/24. discharged home on 06/05/24. - [facility] for another fall, d/c 8/1 - Sent to ED by me on 8/5 for confusion/not safe at home due to missed meds, concern for a fib, dx with hyperglycemia, received insulin, did not know why she was there, and was sent home.  Feeling better today. Has been taking insulin. BG 200s most of the last few days. Taking 19 U Lantus in evenings, timing varies depending when she goes to sleep/if she sleeps before she takes it.  Took at 6 am this morning. Heart rate: Always irregular per her, feels fine. Brain fog: Improved, thinking more clearly. Daughter notes pt may qualify for another home care program. Missed 1 dose of antibiotic, but has taken all Eliquis.  Patient denies chest pain, palpitation, discomfort, distress, dizziness, headache and n/v.  Medication updated  Patient/ patient's caregiver reports no weight loss >10 lbs in the past 6 months. No changes in dentition or swallowing reported, No changes in hearing or vision reported. More confused lately. Patient denies any symptoms of depression or anxiety. Patient is ADL independent/dependent and IADL independent/dependent. 2 falls in last 3 months  Patient's home environment is safe.  Goals of care discussed 10min. Full Code with no limitations.

## 2024-08-10 NOTE — REASON FOR VISIT
[Post ER] : a Post ER visit [Pre-Visit Preparation] : pre-visit preparation was done [Intercurrent Specialty/Sub-specialty Visits] : the patient has intercurrent specialty/sub-specialty visits [FreeTextEntry2] : chart reviewed [FreeTextEntry3] :     hematology, ONCOLOGY, CARDIOLOGY, endocrinology,

## 2024-08-10 NOTE — HEALTH RISK ASSESSMENT
[HRA Reviewed] : Health risk assessment reviewed [Independent] : using telephone [Some assistance needed] : managing finances [Any fall with injury in past year] : Patient reported fall with injury in the past year [Yes] : The patient has visual impairment [FreeTextEntry8] : using rollator in the home and outside [facility] [FreeTextEntry2] : use rollator in and outside home, mobility impairment, multiple falls.

## 2024-08-10 NOTE — PHYSICAL EXAM
[No Acute Distress] : no acute distress [Normal Voice/Communication] : normal voice communication [Normal Sclera/Conjunctiva] : normal sclera/conjunctiva [EOMI] : extra ocular movement intact [Normal Outer Ear/Nose] : the ears and nose were normal in appearance [Normal Oropharynx] : the oropharynx was normal [Normal TMs] : both tympanic membranes were normal [No JVD] : no jugular venous distention [No LAD] : no lymphadenopathy [No Respiratory Distress] : no respiratory distress [Clear to Auscultation] : lungs were clear to auscultation bilaterally [No Accessory Muscle Use] : no accessory muscle use [Normal S1, S2] : normal S1 and S2 [No Murmurs] : no murmurs heard [Breast Exam Declined] : patient declined to have breast exam done [Normal Bowel Sounds] : normal bowel sounds [Non Tender] : non-tender [Soft] : abdomen soft [Not Distended] : not distended [No Rash] : no rash [Oriented x3] : oriented to person, place, and time [Normal Mood] : the mood was normal [Over the Past 2 Weeks, Have You Felt Down, Depressed, or Hopeless?] : 1.) Over the past 2 weeks, have you felt down, depressed, or hopeless? No [Over the Past 2 Weeks, Have You Felt Little Interest or Pleasure Doing Things?] : 2.) Over the past 2 weeks, have you felt little interest or pleasure doing things? No [Foot Ulcers] : no foot ulcers [de-identified] : pt dressed neatly, speaking clearly, ambulating with walker [de-identified] : obese [de-identified] : irregular hearbeat, 80s-90s, BRICE, +edema R>L, calves NTTP [de-identified] : pleasant, alert  [de-identified] : unsteady gait,

## 2024-08-10 NOTE — COUNSELING
[Overweight - ( BMI 25.1 - 29.9 )] : overweight -  ( BMI 25.1 - 29.9 ) [DASH diet recommended] : DASH diet recommended [] : foot exam [Patient not on disease-modifying anti-rheumatic drug due to overall prognosis] : Patient not on disease-modifying anti-rheumatic drug due to overall prognosis [Completed] : Aspirin use discussion completed [Improve mobility] : improve mobility [Improve pain control] : improve pain control [Decrease hospital use] : decrease hospital use [Minimize unnecessary interventions] : minimize unnecessary interventions [Discussed disease trajectory with patient/caregiver] : discussed disease trajectory with patient/caregiver [Full Code] : Code Status: Full Code [No Limitations] : Treatment Guidelines: No limitations [Long Term Intubation] : Intubation: Long term intubation [Obese (BMI >29.9)] : Obese - BMI >29.9 [DASH diet given] : DASH diet given [Sodium restriction 2gm recommended] : Sodium restriction 2 gm recommended [Non - Smoker] : non-smoker [Use assistive device to avoid falls] : use assistive device to avoid falls [Remove clutter and unsafe carpeting to avoid falls] : remove clutter and unsafe carpeting to avoid falls [Use grab bars] : use grab bars [Smoke/CO Detectors] : smoke/CO detectors [Bone Density] : Bone Density [Mammogram] : Mammogram [Lung Cancer Screening in qualified smokers] : Lung Canser Screening [Colon Cancer Screening] : Colon Cancer Screening [FreeTextEntry3] : renal diet [FreeTextEntry2] : declines need for medical alert

## 2024-08-11 NOTE — REVIEW OF SYSTEMS
[Diarrhea: Grade 0] : Diarrhea: Grade 0 [Negative] : Allergic/Immunologic [Fever] : no fever [Abdominal Pain] : no abdominal pain [Dysuria] : no dysuria [Vomiting] : no vomiting

## 2024-08-11 NOTE — ASSESSMENT
[FreeTextEntry1] : 74 yo F with CKD, RA, DM, HTN here for NSCLC with BMs and liver met (STK11 mutant, PIK3CA mutant, TP53 mutant, PD-L1 negative).  NSCLC - BMs s/p SRS, liver met. Received C1 paclitaxel/pembrolizumab on 9/7/2023 at [facility] [place]. This regimen was favored over carbo/pemetrexed/pembrolizumab due to CKD and baseline GFR less than 45. She continued carboplatin/Taxol/pembrolizumab due to CKD Treatment was delayed and held several times because of uncontrolled hyperglycemia --patient very non-compliant with food restrictions Post 4 cycles induction chemoIO showing partial response with CT CAP 2/6/2024 (90% reduction in lung mass) and MRIB 2/23/2024 (s/p SRS, n/e of residual BMs) Now (June 2024) she has progression of disease in the body and brain on maintenance pembrolizumab. Given her creatinine clearance of 41 she is not a candidate for targeted therapy unless her creatinine improves. -- We discussed today Treatment options, ideally she should get Alimta, however given her creatinine clearance I worry about significant cytopenias, also Alimta is contraindicated in patients with creatinine clearance of less than 45. Other options include single agent gemcitabine with therapy, given on day 1 and day 8 of each 21-day cycle. Discussed risks, benefits and treatment schedule today.  Risks include but are not limited to profound cytopenias, hair loss, rashes, constipation or diarrhea, abdominal pain.  Informed consent signed.  Educational materials provided.  Brain mets --refer to Drs.D'Amico and Wernicke for SRS to new lesions  CKD - current Cr 1.3 --stable   Rheumatoid arthritis Being managed by Dr. Johnston  RTC next week with MARIBELL Vee for first tx. Can use labs from today for tx next week. Labs subsequent visits: CBC, CMP, TSH

## 2024-08-11 NOTE — HISTORY OF PRESENT ILLNESS
[Disease: _____________________] : Disease: [unfilled] [100: Normal, no complaints, no evidence of disease.] : 100: Normal, no complaints, no evidence of disease. [ECOG Performance Status: 0 - Fully active, able to carry on all pre-disease performance without restriction] : Performance Status: 0 - Fully active, able to carry on all pre-disease performance without restriction [de-identified] : Noemi Minor is a 73-year-old female with CKD, DM, RA, HTN who presents to the clinic for follow up of NSCLC with BMs (PDL1 negative, STK11, PIK3CA, TP53).  Onc Hx: Ex-smoker with history of CKD, baseline creatinine is around 2, diabetes, rheumatoid arthritis, HTN. She was experiencing 1 week of daily falls with lower extremity weakness without dizziness, was admitted to [facility]. MRI brain showed 0.9 cm right frontal lobe mass with surrounding vasogenic edema. CT chest abdomen pelvis revealed left upper lobe mass 2.6 x 4.1 cm in posterior aspect of left upper lobe abutting major fissure.  The mass extends to adjacent left hilum and laterally to left lateral pleura.  1.5 cm left lobe of liver lesion.  1.2 cm pancreatic body cystic lesion. 7/14/2023 bronchoscopy-lingular biopsy poorly differentiated adenocarcinoma, positive TTF-1, TMB 17.3, PD-L1 negative. Tier II: Variants of Potential Clinical Significance STK11 p.([id number]) PIK3CA p.([id number]) TP53 p.([id number]) Tier III: Variants of Unknown Clinical Significance ALK p.([id number]) 8/2023: [place] [facility] [place] her first chemotherapy cycle, Taxol/pembrolizumab only due to carboplatin shortage. Taxol was given because her labs there showed a GFR<45, excluding her from being able to receive pemetrexed. She tolerated treatment reasonably well, without any neuropathy or cytopenias. She lost he hair after first chemotherapy cycle. 9/5/2023: [place] at [facility] [place]: Left upper lobe perihilar hypermetabolic mass consistent with biopsy-proven malignancy.  Mildly FDG avid right thyroid nodule, correlate with thyroid ultrasound. 9/7/2023: Received cycle 1 paclitaxel/pembrolizumab at [facility] [place] (Cr 1.2 there) 10/10/2023: MRIB: Since 7/11/2023, right posterior frontal enhancing lesion and vasogenic edema are completely resolved as a favorable response to therapy. No new enhancing lesion.  2/6/24: CT CAP:Since July 11, 2023, significantly decreased left upper lobe lesion. No suspicious lymphadenopathy. No new disease in the chest abdomen or pelvis. Stable incidental findings above. 2/23/24: MRI HEAD: No evidence of abnormal enhancement to suggest residual/recurrent metastatic disease. Chronic lacunar infarctions. Small right temporal occipital chronic infarction. Tiny left cerebellar chronic infarction. Microvascular disease.  6/14/2024: CT CAP: Interval increase in left upper lobe pulmonary lesion. Suggestion of increase in left hepatic lesion, likely metastatic. Suggestion of increased soft tissue density and enlargement of cervix, not well visualized on this exam. New fluid collection in the subcutaneous tissues of the left gluteal region may represent an evolving hematoma from patient's recent fall in May 2024.  MRIB: Peripherally enhancing lesions are identified as described above. These findings are likely compatible with metastasis (progression of patient's underlying disease process) given patient's history. [de-identified] : Adenocarcinoma - STK11, PIK3CA, TP53, VUS in ALK, PDL1 negative [de-identified] : [place] [place].Rosa Jeffrey NeuroSx: Dr.D'Amico [facility]: Dr. Wernicke [de-identified] : Patient had a complicated course with persistent hyperglycemia and 1 hospitalization from the fall. Despite this her most recent scans are showing evident progression of disease. [FreeTextEntry1] : 9/7/2023: Taxol/pembrolizumab C1 given at [facility] [place] (no carboplatin due to national shortage) 10/26/2023: C2 carboplatin/Taxol/pembrolizumab 11/16/23 C3 carboplatin/taxol/pembro (held due to hyperglycemia, elevated lfts and hyponatremia) 11/22/23  C3 carboplatin/taxol/pembro 12/13/23 C4 held due to hyperglycemia.  1/9/2024: C4 carboplatin/Taxol/pembrolizumab  3/12/2024 C1 pembro  4/2/2024.  C2 pembro 4/23/2024. C3 pembro No Show due to hyperglyemia. Pt was seen in ED 5/1/2024. C3 pembro 6/6/2024: C4 pembro (delayed due to fall and rehab placement post hospitalization)

## 2024-08-11 NOTE — HISTORY OF PRESENT ILLNESS
[Disease: _____________________] : Disease: [unfilled] [100: Normal, no complaints, no evidence of disease.] : 100: Normal, no complaints, no evidence of disease. [ECOG Performance Status: 0 - Fully active, able to carry on all pre-disease performance without restriction] : Performance Status: 0 - Fully active, able to carry on all pre-disease performance without restriction [de-identified] : Noemi Minor is a 73-year-old female with CKD, DM, RA, HTN who presents to the clinic for follow up of NSCLC with BMs (PDL1 negative, STK11, PIK3CA, TP53).  Onc Hx: Ex-smoker with history of CKD, baseline creatinine is around 2, diabetes, rheumatoid arthritis, HTN. She was experiencing 1 week of daily falls with lower extremity weakness without dizziness, was admitted to [facility]. MRI brain showed 0.9 cm right frontal lobe mass with surrounding vasogenic edema. CT chest abdomen pelvis revealed left upper lobe mass 2.6 x 4.1 cm in posterior aspect of left upper lobe abutting major fissure.  The mass extends to adjacent left hilum and laterally to left lateral pleura.  1.5 cm left lobe of liver lesion.  1.2 cm pancreatic body cystic lesion. 7/14/2023 bronchoscopy-lingular biopsy poorly differentiated adenocarcinoma, positive TTF-1, TMB 17.3, PD-L1 negative. Tier II: Variants of Potential Clinical Significance STK11 p.([id number]) PIK3CA p.([id number]) TP53 p.([id number]) Tier III: Variants of Unknown Clinical Significance ALK p.([id number]) 8/2023: She followed up at [facility] [place] where she received her first chemotherapy cycle, Taxol/pembrolizumab only due to carboplatin shortage. Taxol was given because her labs there showed a GFR<45, excluding her from being able to receive pemetrexed. She tolerated treatment reasonably well, without any neuropathy or cytopenias. She lost he hair after first chemotherapy cycle. 9/5/2023: PET at [facility] [place]: Left upper lobe perihilar hypermetabolic mass consistent with biopsy-proven malignancy.  Mildly FDG avid right thyroid nodule, correlate with thyroid ultrasound. 9/7/2023: Received cycle 1 paclitaxel/pembrolizumab at [facility] (Cr 1.2 there) 10/10/2023: MRIB: Since 7/11/2023, right posterior frontal enhancing lesion and vasogenic edema are completely resolved as a favorable response to therapy. No new enhancing lesion.  2/6/24: CT CAP:Since July 11, 2023, significantly decreased left upper lobe lesion. No suspicious lymphadenopathy. No new disease in the chest abdomen or pelvis. Stable incidental findings above. 2/23/24: MRI HEAD: No evidence of abnormal enhancement to suggest residual/recurrent metastatic disease. Chronic lacunar infarctions. Small right temporal occipital chronic infarction. Tiny left cerebellar chronic infarction. Microvascular disease.  6/14/2024: CT CAP: Interval increase in left upper lobe pulmonary lesion. Suggestion of increase in left hepatic lesion, likely metastatic. Suggestion of increased soft tissue density and enlargement of cervix, not well visualized on this exam. New fluid collection in the subcutaneous tissues of the left gluteal region may represent an evolving hematoma from patient's recent fall in May 2024.  MRIB: Peripherally enhancing lesions are identified as described above. These findings are likely compatible with metastasis (progression of patient's underlying disease process) given patient's history. [de-identified] : Adenocarcinoma - STK11, PIK3CA, TP53, VUS in ALK, PDL1 negative [de-identified] : Medonc ([facility]): Dr.Rosa Jeffrey NeuroSx: Dr.D'Amico [facility]: Dr. Wernicke [FreeTextEntry1] : 9/7/2023: [place] [facility] [place] due to [place] shortage[place] 10/26/2023: C2 carboplatin/Taxol/pembrolizumab 11/16/23 C3 carboplatin/taxol/pembro (held due to hyperglycemia, elevated lfts and hyponatremia) 11/22/23  C3 carboplatin/taxol/pembro 12/13/23 C4 held due to hyperglycemia.  1/9/2024: C4 carboplatin/Taxol/pembrolizumab  3/12/2024 C1 pembro  4/2/2024.  C2 pembro 4/23/2024. C3 pembro No Show due to hyperglyemia. Pt was seen in ED 5/1/2024. C3 pembro 6/6/2024: C4 pembro (delayed due to fall and rehab placement post hospitalization) [de-identified] : Patient had a complicated course with persistent hyperglycemia and 1 hospitalization from the fall. Despite this her most recent scans are showing evident progression of disease.

## 2024-08-11 NOTE — ASSESSMENT
[FreeTextEntry1] : 74 yo F with CKD, RA, DM, HTN here for NSCLC with BMs and liver met (STK11 mutant, PIK3CA mutant, TP53 mutant, PD-L1 negative).  NSCLC - BMs s/p SRS, liver met. [place] [facility] [place] regimen was favored over carbo/pemetrexed/pembrolizumab due to CKD and baseline GFR less than 45. She continued carboplatin/Taxol/pembrolizumab due to CKD Treatment was delayed and held several times because of uncontrolled hyperglycemia --patient very non-compliant with food restrictions Post 4 cycles induction chemoIO showing partial response with CT CAP 2/6/2024 (90% reduction in lung mass) and MRIB 2/23/2024 (s/p SRS, n/e of residual BMs) Now (June 2024) she has progression of disease in the body and brain on maintenance pembrolizumab. Given her creatinine clearance of 41 she is not a candidate for targeted therapy unless her creatinine improves. -- We discussed today Treatment options, ideally she should get Alimta, however given her creatinine clearance I worry about significant cytopenias, also Alimta is contraindicated in patients with creatinine clearance of less than 45. Other options include single agent gemcitabine with therapy, given on day 1 and day 8 of each 21-day cycle. Discussed risks, benefits and treatment schedule today.  Risks include but are not limited to profound cytopenias, hair loss, rashes, constipation or diarrhea, abdominal pain.  Informed consent signed.  Educational materials provided.  Brain mets --refer to Drs.D'Amico and Wernicke for SRS to new lesions  CKD - current Cr 1.3 --stable   Rheumatoid arthritis Being managed by Dr. Johnston  RTC next week with MARIBELL Vee for first tx. Can use labs from today for tx next week. Labs subsequent visits: CBC, CMP, TSH

## 2024-08-15 ENCOUNTER — NON-APPOINTMENT (OUTPATIENT)
Age: 74
End: 2024-08-15

## 2024-08-15 ENCOUNTER — INPATIENT (INPATIENT)
Facility: HOSPITAL | Age: 74
LOS: 1 days | Discharge: HOME | DRG: 638 | End: 2024-08-17
Attending: STUDENT IN AN ORGANIZED HEALTH CARE EDUCATION/TRAINING PROGRAM | Admitting: INTERNAL MEDICINE
Payer: MEDICARE

## 2024-08-15 ENCOUNTER — APPOINTMENT (OUTPATIENT)
Dept: HEMATOLOGY ONCOLOGY | Facility: CLINIC | Age: 74
End: 2024-08-15
Payer: MEDICARE

## 2024-08-15 ENCOUNTER — LABORATORY RESULT (OUTPATIENT)
Age: 74
End: 2024-08-15

## 2024-08-15 VITALS
HEIGHT: 63 IN | OXYGEN SATURATION: 95 % | HEART RATE: 94 BPM | RESPIRATION RATE: 16 BRPM | SYSTOLIC BLOOD PRESSURE: 137 MMHG | TEMPERATURE: 98 F | DIASTOLIC BLOOD PRESSURE: 82 MMHG

## 2024-08-15 DIAGNOSIS — E11.9 TYPE 2 DIABETES MELLITUS WITHOUT COMPLICATIONS: ICD-10-CM

## 2024-08-15 DIAGNOSIS — Z96.641 PRESENCE OF RIGHT ARTIFICIAL HIP JOINT: Chronic | ICD-10-CM

## 2024-08-15 DIAGNOSIS — E78.5 HYPERLIPIDEMIA, UNSPECIFIED: ICD-10-CM

## 2024-08-15 DIAGNOSIS — E11.00 TYPE 2 DIABETES MELLITUS WITH HYPEROSMOLARITY WITHOUT NONKETOTIC HYPERGLYCEMIC-HYPEROSMOLAR COMA (NKHHC): ICD-10-CM

## 2024-08-15 DIAGNOSIS — I10 ESSENTIAL (PRIMARY) HYPERTENSION: ICD-10-CM

## 2024-08-15 DIAGNOSIS — D64.9 ANEMIA, UNSPECIFIED: ICD-10-CM

## 2024-08-15 DIAGNOSIS — N17.9 ACUTE KIDNEY FAILURE, UNSPECIFIED: ICD-10-CM

## 2024-08-15 DIAGNOSIS — Z29.9 ENCOUNTER FOR PROPHYLACTIC MEASURES, UNSPECIFIED: ICD-10-CM

## 2024-08-15 DIAGNOSIS — N18.30 CHRONIC KIDNEY DISEASE, STAGE 3 UNSPECIFIED: ICD-10-CM

## 2024-08-15 DIAGNOSIS — Z86.718 PERSONAL HISTORY OF OTHER VENOUS THROMBOSIS AND EMBOLISM: ICD-10-CM

## 2024-08-15 DIAGNOSIS — R60.9 EDEMA, UNSPECIFIED: ICD-10-CM

## 2024-08-15 DIAGNOSIS — R73.9 HYPERGLYCEMIA, UNSPECIFIED: ICD-10-CM

## 2024-08-15 DIAGNOSIS — Z96.641 PRESENCE OF RIGHT ARTIFICIAL HIP JOINT: ICD-10-CM

## 2024-08-15 DIAGNOSIS — C34.90 MALIGNANT NEOPLASM OF UNSPECIFIED PART OF UNSPECIFIED BRONCHUS OR LUNG: ICD-10-CM

## 2024-08-15 DIAGNOSIS — M06.9 RHEUMATOID ARTHRITIS, UNSPECIFIED: ICD-10-CM

## 2024-08-15 LAB
ADD ON TEST-SPECIMEN IN LAB: SIGNIFICANT CHANGE UP
ADD ON TEST-SPECIMEN IN LAB: SIGNIFICANT CHANGE UP
ALBUMIN SERPL ELPH-MCNC: 3.2 G/DL — LOW (ref 3.3–5)
ALP SERPL-CCNC: 153 U/L — HIGH (ref 40–120)
ALT FLD-CCNC: 9 U/L — LOW (ref 10–45)
ANION GAP SERPL CALC-SCNC: 11 MMOL/L — SIGNIFICANT CHANGE UP (ref 5–17)
ANION GAP SERPL CALC-SCNC: 12 MMOL/L — SIGNIFICANT CHANGE UP (ref 5–17)
APPEARANCE UR: CLEAR — SIGNIFICANT CHANGE UP
AST SERPL-CCNC: 14 U/L — SIGNIFICANT CHANGE UP (ref 10–40)
BACTERIA # UR AUTO: ABNORMAL /HPF
BASE EXCESS BLDV CALC-SCNC: 2 MMOL/L — SIGNIFICANT CHANGE UP (ref -2–3)
BASOPHILS # BLD AUTO: 0.02 K/UL — SIGNIFICANT CHANGE UP (ref 0–0.2)
BASOPHILS NFR BLD AUTO: 0.1 % — SIGNIFICANT CHANGE UP (ref 0–2)
BILIRUB SERPL-MCNC: 0.3 MG/DL — SIGNIFICANT CHANGE UP (ref 0.2–1.2)
BILIRUB UR-MCNC: NEGATIVE — SIGNIFICANT CHANGE UP
BLD GP AB SCN SERPL QL: NEGATIVE — SIGNIFICANT CHANGE UP
BUN SERPL-MCNC: 23 MG/DL — SIGNIFICANT CHANGE UP (ref 7–23)
BUN SERPL-MCNC: 34 MG/DL — HIGH (ref 7–23)
CA-I SERPL-SCNC: 1.28 MMOL/L — SIGNIFICANT CHANGE UP (ref 1.15–1.33)
CALCIUM SERPL-MCNC: 9.4 MG/DL — SIGNIFICANT CHANGE UP (ref 8.4–10.5)
CALCIUM SERPL-MCNC: 9.5 MG/DL — SIGNIFICANT CHANGE UP (ref 8.4–10.5)
CHLORIDE SERPL-SCNC: 100 MMOL/L — SIGNIFICANT CHANGE UP (ref 96–108)
CHLORIDE SERPL-SCNC: 95 MMOL/L — LOW (ref 96–108)
CO2 BLDV-SCNC: 28.7 MMOL/L — HIGH (ref 22–26)
CO2 SERPL-SCNC: 26 MMOL/L — SIGNIFICANT CHANGE UP (ref 22–31)
CO2 SERPL-SCNC: 26 MMOL/L — SIGNIFICANT CHANGE UP (ref 22–31)
COLOR SPEC: YELLOW — SIGNIFICANT CHANGE UP
CREAT SERPL-MCNC: 1.26 MG/DL — SIGNIFICANT CHANGE UP (ref 0.5–1.3)
CREAT SERPL-MCNC: 1.56 MG/DL — HIGH (ref 0.5–1.3)
DIFF PNL FLD: ABNORMAL
EGFR: 34 ML/MIN/1.73M2 — LOW
EGFR: 34 ML/MIN/1.73M2 — LOW
EGFR: 45 ML/MIN/1.73M2 — LOW
EGFR: 45 ML/MIN/1.73M2 — LOW
EOSINOPHIL # BLD AUTO: 0 K/UL — SIGNIFICANT CHANGE UP (ref 0–0.5)
EOSINOPHIL NFR BLD AUTO: 0 % — SIGNIFICANT CHANGE UP (ref 0–6)
GAS PNL BLDV: 128 MMOL/L — LOW (ref 136–145)
GAS PNL BLDV: SIGNIFICANT CHANGE UP
GLUCOSE BLDC GLUCOMTR-MCNC: 315 MG/DL — HIGH (ref 70–99)
GLUCOSE BLDC GLUCOMTR-MCNC: 341 MG/DL — HIGH (ref 70–99)
GLUCOSE BLDC GLUCOMTR-MCNC: 402 MG/DL — HIGH (ref 70–99)
GLUCOSE SERPL-MCNC: 341 MG/DL — HIGH (ref 70–99)
GLUCOSE SERPL-MCNC: 754 MG/DL — CRITICAL HIGH (ref 70–99)
GLUCOSE UR QL: >=1000 MG/DL
HCO3 BLDV-SCNC: 27 MMOL/L — SIGNIFICANT CHANGE UP (ref 22–29)
HCT VFR BLD CALC: 33.1 % — LOW (ref 34.5–45)
HGB BLD-MCNC: 10.3 G/DL — LOW (ref 11.5–15.5)
IMM GRANULOCYTES NFR BLD AUTO: 0.9 % — SIGNIFICANT CHANGE UP (ref 0–0.9)
KETONES UR-MCNC: NEGATIVE MG/DL — SIGNIFICANT CHANGE UP
LEUKOCYTE ESTERASE UR-ACNC: NEGATIVE — SIGNIFICANT CHANGE UP
LYMPHOCYTES # BLD AUTO: 1.29 K/UL — SIGNIFICANT CHANGE UP (ref 1–3.3)
LYMPHOCYTES # BLD AUTO: 9.3 % — LOW (ref 13–44)
MCHC RBC-ENTMCNC: 26.8 PG — LOW (ref 27–34)
MCHC RBC-ENTMCNC: 31.1 GM/DL — LOW (ref 32–36)
MCV RBC AUTO: 86.2 FL — SIGNIFICANT CHANGE UP (ref 80–100)
MONOCYTES # BLD AUTO: 0.44 K/UL — SIGNIFICANT CHANGE UP (ref 0–0.9)
MONOCYTES NFR BLD AUTO: 3.2 % — SIGNIFICANT CHANGE UP (ref 2–14)
NEUTROPHILS # BLD AUTO: 12.05 K/UL — HIGH (ref 1.8–7.4)
NEUTROPHILS NFR BLD AUTO: 86.5 % — HIGH (ref 43–77)
NITRITE UR-MCNC: NEGATIVE — SIGNIFICANT CHANGE UP
NRBC # BLD: 0 /100 WBCS — SIGNIFICANT CHANGE UP (ref 0–0)
NRBC BLD-RTO: 0 /100 WBCS — SIGNIFICANT CHANGE UP (ref 0–0)
PCO2 BLDV: 44 MMHG — HIGH (ref 39–42)
PH BLDV: 7.4 — SIGNIFICANT CHANGE UP (ref 7.32–7.43)
PH UR: 6 — SIGNIFICANT CHANGE UP (ref 5–8)
PLATELET # BLD AUTO: 257 K/UL — SIGNIFICANT CHANGE UP (ref 150–400)
PO2 BLDV: 51 MMHG — HIGH (ref 25–45)
POTASSIUM BLDV-SCNC: 4.5 MMOL/L — SIGNIFICANT CHANGE UP (ref 3.5–5.1)
POTASSIUM SERPL-MCNC: 3.7 MMOL/L — SIGNIFICANT CHANGE UP (ref 3.5–5.3)
POTASSIUM SERPL-MCNC: 4.3 MMOL/L — SIGNIFICANT CHANGE UP (ref 3.5–5.3)
POTASSIUM SERPL-SCNC: 3.7 MMOL/L — SIGNIFICANT CHANGE UP (ref 3.5–5.3)
POTASSIUM SERPL-SCNC: 4.3 MMOL/L — SIGNIFICANT CHANGE UP (ref 3.5–5.3)
PROT SERPL-MCNC: 6.8 G/DL — SIGNIFICANT CHANGE UP (ref 6–8.3)
PROT UR-MCNC: 300 MG/DL
RBC # BLD: 3.84 M/UL — SIGNIFICANT CHANGE UP (ref 3.8–5.2)
RBC # FLD: 14.2 % — SIGNIFICANT CHANGE UP (ref 10.3–14.5)
RBC CASTS # UR COMP ASSIST: 2 /HPF — SIGNIFICANT CHANGE UP (ref 0–4)
RH IG SCN BLD-IMP: POSITIVE — SIGNIFICANT CHANGE UP
SAO2 % BLDV: 80.4 % — SIGNIFICANT CHANGE UP (ref 67–88)
SODIUM SERPL-SCNC: 133 MMOL/L — LOW (ref 135–145)
SODIUM SERPL-SCNC: 137 MMOL/L — SIGNIFICANT CHANGE UP (ref 135–145)
SP GR SPEC: >1.03 — HIGH (ref 1–1.03)
SQUAMOUS # UR AUTO: 3 /HPF — SIGNIFICANT CHANGE UP (ref 0–5)
UROBILINOGEN FLD QL: 0.2 MG/DL — SIGNIFICANT CHANGE UP (ref 0.2–1)
WBC # BLD: 13.93 K/UL — HIGH (ref 3.8–10.5)
WBC # FLD AUTO: 13.93 K/UL — HIGH (ref 3.8–10.5)
WBC UR QL: 9 /HPF — HIGH (ref 0–5)

## 2024-08-15 PROCEDURE — 99285 EMERGENCY DEPT VISIT HI MDM: CPT

## 2024-08-15 PROCEDURE — G2211 COMPLEX E/M VISIT ADD ON: CPT

## 2024-08-15 PROCEDURE — 99215 OFFICE O/P EST HI 40 MIN: CPT

## 2024-08-15 PROCEDURE — 99223 1ST HOSP IP/OBS HIGH 75: CPT | Mod: GC

## 2024-08-15 RX ORDER — INSULIN LISPRO 100 U/ML
10 INJECTION, SOLUTION INTRAVENOUS; SUBCUTANEOUS
Refills: 0 | Status: DISCONTINUED | OUTPATIENT
Start: 2024-08-15 | End: 2024-08-15

## 2024-08-15 RX ORDER — ATORVASTATIN CALCIUM 80 MG/1
40 TABLET, FILM COATED ORAL AT BEDTIME
Refills: 0 | Status: DISCONTINUED | OUTPATIENT
Start: 2024-08-15 | End: 2024-08-17

## 2024-08-15 RX ORDER — SODIUM CHLORIDE 9 G/1000ML
1000 INJECTION, SOLUTION INTRAVENOUS
Refills: 0 | Status: DISCONTINUED | OUTPATIENT
Start: 2024-08-15 | End: 2024-08-17

## 2024-08-15 RX ORDER — INSULIN GLARGINE-YFGN 100 [IU]/ML
20 INJECTION, SOLUTION SUBCUTANEOUS AT BEDTIME
Refills: 0 | Status: DISCONTINUED | OUTPATIENT
Start: 2024-08-15 | End: 2024-08-15

## 2024-08-15 RX ORDER — DEXTROSE 50 % IN WATER 50 %
25 SYRINGE (ML) INTRAVENOUS ONCE
Refills: 0 | Status: DISCONTINUED | OUTPATIENT
Start: 2024-08-15 | End: 2024-08-17

## 2024-08-15 RX ORDER — INSULIN LISPRO 100 U/ML
INJECTION, SOLUTION INTRAVENOUS; SUBCUTANEOUS
Refills: 0 | Status: DISCONTINUED | OUTPATIENT
Start: 2024-08-15 | End: 2024-08-17

## 2024-08-15 RX ORDER — DEXTROSE 50 % IN WATER 50 %
12.5 SYRINGE (ML) INTRAVENOUS ONCE
Refills: 0 | Status: DISCONTINUED | OUTPATIENT
Start: 2024-08-15 | End: 2024-08-17

## 2024-08-15 RX ORDER — INSULIN LISPRO 100 U/ML
18 INJECTION, SOLUTION INTRAVENOUS; SUBCUTANEOUS
Refills: 0 | Status: DISCONTINUED | OUTPATIENT
Start: 2024-08-15 | End: 2024-08-16

## 2024-08-15 RX ORDER — DEXTROSE 50 % IN WATER 50 %
15 SYRINGE (ML) INTRAVENOUS ONCE
Refills: 0 | Status: DISCONTINUED | OUTPATIENT
Start: 2024-08-15 | End: 2024-08-17

## 2024-08-15 RX ORDER — GLUCAGON 3 MG/1
1 POWDER NASAL ONCE
Refills: 0 | Status: DISCONTINUED | OUTPATIENT
Start: 2024-08-15 | End: 2024-08-17

## 2024-08-15 RX ORDER — APIXABAN 2.5 MG/1
5 TABLET, FILM COATED ORAL EVERY 12 HOURS
Refills: 0 | Status: DISCONTINUED | OUTPATIENT
Start: 2024-08-15 | End: 2024-08-17

## 2024-08-15 RX ORDER — NIFEDIPINE 30 MG
60 TABLET, EXTENDED RELEASE 24 HR ORAL EVERY 24 HOURS
Refills: 0 | Status: DISCONTINUED | OUTPATIENT
Start: 2024-08-15 | End: 2024-08-17

## 2024-08-15 RX ORDER — INSULIN GLARGINE-YFGN 100 [IU]/ML
32 INJECTION, SOLUTION SUBCUTANEOUS AT BEDTIME
Refills: 0 | Status: DISCONTINUED | OUTPATIENT
Start: 2024-08-15 | End: 2024-08-16

## 2024-08-15 RX ADMIN — Medication 10 UNIT(S): at 14:58

## 2024-08-15 RX ADMIN — INSULIN LISPRO 8: 100 INJECTION, SOLUTION INTRAVENOUS; SUBCUTANEOUS at 22:36

## 2024-08-15 RX ADMIN — Medication 60 MILLIGRAM(S): at 21:48

## 2024-08-15 RX ADMIN — INSULIN GLARGINE-YFGN 32 UNIT(S): 100 INJECTION, SOLUTION SUBCUTANEOUS at 22:34

## 2024-08-15 RX ADMIN — APIXABAN 5 MILLIGRAM(S): 2.5 TABLET, FILM COATED ORAL at 21:48

## 2024-08-15 RX ADMIN — Medication 1000 MILLILITER(S): at 14:06

## 2024-08-15 RX ADMIN — ATORVASTATIN CALCIUM 40 MILLIGRAM(S): 80 TABLET, FILM COATED ORAL at 21:48

## 2024-08-15 NOTE — ASSESSMENT
[FreeTextEntry1] : 73 yo F with CKD, RA, DM, HTN here for NSCLC with BMs and liver met (STK11 mutant, PIK3CA mutant, TP53 mutant, PD-L1 negative).  #NSCLC - BMs s/p SRS, liver met. Received C1 paclitaxel/pembrolizumab on 9/7/2023 at [facility] [place]. This regimen was favored over carbo/pemetrexed/pembrolizumab due to CKD and baseline GFR less than 45. She continued carboplatin/Taxol/pembrolizumab due to CKD --Treatment was delayed and held several times because of uncontrolled hyperglycemia --patient very non-compliant with food restrictions --Post 4 cycles induction chemoIO showing partial response with CT CAP 2/6/2024 (90% reduction in lung mass) and MRIB 2/23/2024 (s/p SRS, n/e of residual BMs) --June 2024 she has progression of disease in the body and brain on maintenance pembrolizumab. Given her creatinine clearance of 41 she is not a candidate for targeted therapy unless her creatinine improves. --Treatment options, ideally she should get Alimta, however given her creatinine clearance I worry about significant cytopenias, also Alimta is contraindicated in patients with creatinine clearance of less than 45 therefore planned for single agent gemcitabine with therapy, given on day 1 and day 8 of each 21-day cycle (when hyperglycemia resolved). Discussed risks, benefits and treatment schedule. Risks include but are not limited to profound cytopenias, hair loss, rashes, constipation or diarrhea, abdominal pain. Informed consent signed. Educational materials provided. --gemcitabine C2 deferred today, given uncontrolled hyperglycemia --referred to ED today, will reschedule treatment pending clinical course  #Brain metastases --referred to Drs. D'Amico and Wernicke for SRS to new lesions  #CKD - current Cr 1.6 --likely pre-renal ISO uncontrolled hyperglycemia; will monitor ED course  #Rheumatoid arthritis --Being managed by Dr. Johnston  [facility] [place] for scheduled appt on 8/21/24, will adjust pending ED course. Please see attending physician attestation below.

## 2024-08-15 NOTE — REVIEW OF SYSTEMS
[Fever] : no fever [Chills] : no chills [Night Sweats] : no night sweats [Eye Pain] : no eye pain [Vision Problems] : no vision problems [Dysphagia] : no dysphagia [Odynophagia] : no odynophagia [Chest Pain] : no chest pain [Palpitations] : no palpitations [Shortness Of Breath] : no shortness of breath [Wheezing] : no wheezing [Abdominal Pain] : no abdominal pain [Vomiting] : no vomiting [Dysuria] : no dysuria [Incontinence] : no incontinence [Joint Pain] : no joint pain [Joint Stiffness] : no joint stiffness [Skin Rash] : no skin rash [Fainting] : no fainting [Muscle Weakness] : no muscle weakness [Swollen Glands] : no swollen glands

## 2024-08-15 NOTE — CHART NOTE - NSCHARTNOTEFT_GEN_A_CORE
Patient had altered mental status on admission most likely due to toxic metabolic encephalopathy and hyperglycemia, mental status improved after correcting blood sugar levels.

## 2024-08-15 NOTE — PHYSICAL EXAM
[Normal] : affect appropriate [de-identified] : Appropriately conversant, but has difficulty recalling events yesterday (ie. taking insulin) or why she came to clinic at night [de-identified] : supple [de-identified] : Bilateral calf tenderness. 2+ pitting edema of RLE to knee. WWP. [de-identified] : 5/5 strength in UE. 5/5 strength in RLE. 4+/5 strength in LLE. [de-identified] : No rashes. 0.5cm healing scabbed circular lesion to right shin, without erythema, warmth, or fluctuance. [de-identified] : AOx3. CN grossly intact. No focal neurologic deficits. Normal speech.

## 2024-08-15 NOTE — HISTORY OF PRESENT ILLNESS
[Disease: _____________________] : Disease: [unfilled] [de-identified] : Noemi Minor is a 74-year-old female with CKD, DM, RA, HTN who presents to the clinic for follow up of NSCLC with BMs (PDL1 negative, STK11, PIK3CA, TP53).  Onc Hx: Ex-smoker with history of CKD, baseline creatinine is around 2, diabetes, rheumatoid arthritis, HTN. She was experiencing 1 week of daily falls with lower extremity weakness without dizziness, was admitted to [facility]. MRI brain showed 0.9 cm right frontal lobe mass with surrounding vasogenic edema. CT chest abdomen pelvis revealed left upper lobe mass 2.6 x 4.1 cm in posterior aspect of left upper lobe abutting major fissure. The mass extends to adjacent left hilum and laterally to left lateral pleura. 1.5 cm left lobe of liver lesion. 1.2 cm pancreatic body cystic lesion. 7/14/2023 bronchoscopy-lingular biopsy poorly differentiated adenocarcinoma, positive TTF-1, TMB 17.3, PD-L1 negative. Tier II: Variants of Potential Clinical Significance STK11 p.([id number]) PIK3CA p.([id number]) TP53 p.([id number]) Tier III: Variants of Unknown Clinical Significance ALK p.([id number]) 8/2023: [place] [facility] [place] chemotherapy cycle, Taxol/pembrolizumab only due to carboplatin shortage. Taxol was given because her labs there showed a GFR<45, excluding her from being able to receive pemetrexed. She tolerated treatment reasonably well, without any neuropathy or cytopenias. She lost he hair after first chemotherapy cycle. 9/5/2023: [place] [facility] [place] Left upper lobe perihilar hypermetabolic mass consistent with biopsy-proven malignancy. Mildly FDG avid right thyroid nodule, correlate with thyroid ultrasound. 9/7/2023: [place] [facility] [place] 10/10/2023: MRIB: Since 7/11/2023, right posterior frontal enhancing lesion and vasogenic edema are completely resolved as a favorable response to therapy. No new enhancing lesion.  2/6/24: CT CAP:Since July 11, 2023, significantly decreased left upper lobe lesion. No suspicious lymphadenopathy. No new disease in the chest abdomen or pelvis. Stable incidental findings above. 2/23/24: MRI HEAD: No evidence of abnormal enhancement to suggest residual/recurrent metastatic disease. Chronic lacunar infarctions. Small right temporal occipital chronic infarction. Tiny left cerebellar chronic infarction. Microvascular disease.  6/14/2024: CT CAP: Interval increase in left upper lobe pulmonary lesion. Suggestion of increase in left hepatic lesion, likely metastatic. Suggestion of increased soft tissue density and enlargement of cervix, not well visualized on this exam. New fluid collection in the subcutaneous tissues of the left gluteal region may represent an evolving hematoma from patient's recent fall in May 2024.  MRIB: Peripherally enhancing lesions are identified as described above. These findings are likely compatible with metastasis (progression of patient's underlying disease process) given patient's history.  [de-identified] : Adenocarcinoma - STK11, PIK3CA, TP53, VUS in ALK, PDL1 negative [de-identified] : Medonc ([facility] [place] Dr.Rosa Jeffrey NeuroSx: Dr.D'Amico [facility]: Dr. Wernicke [FreeTextEntry1] : 9/7/2023: Taxol/pembrolizumab C1 given at [facility] [place] (no carboplatin due to national shortage) 10/26/2023: C2 carboplatin/Taxol/pembrolizumab 11/16/23 C3 carboplatin/taxol/pembro (held due to hyperglycemia, elevated lfts and hyponatremia) 11/22/23 C3 carboplatin/taxol/pembro 12/13/23 C4 held due to hyperglycemia. 1/9/2024: C4 carboplatin/Taxol/pembrolizumab 3/12/2024 C1 pembro 4/2/2024. C2 pembro 4/23/2024. C3 pembro No Show due to hyperglyemia. Pt was seen in ED 5/1/2024. C3 pembro 6/6/2024: C4 pembro (delayed due to fall and rehab placement post hospitalization) 7/11/2024: C1 gemcitabine 7/23/2024: S/p SRS to BMs [de-identified] : Presents for initiation of gemcitabine therapy for NSCLC.  However, in office, found to have serum glucose of >700 on today's CMP.   Pt reports feeling well, however, states that she had mistakenly came here last night at 11pm because she thought she was due for her appointment, concerning for confusion. She then proceeded to [employer] for dinner and had a lemonade with nic juice. She believes that she forgot to take her basal insulin last night. States that she only has a HHA from 10am-2pm everyday, which makes keeping track of her new medications (Eliquis, HTN medications, insulin) difficult. States that she has not missed any doses of Eliquis. No reported falls, LOC.

## 2024-08-15 NOTE — END OF VISIT
[] : Fellow [FreeTextEntry3] : 74-year-old female with metastatic non-small cell lung cancer, adenocarcinoma histology presents today for follow-up and treatment with cycle 2 gemcitabine for relapsed refractory non-small cell lung cancer. She completed SRS to new brain metastasis on 7/25 3 which she tolerated very well.  Unfortunately though she was subsequently admitted to the hospital for mechanical fall and there she was discovered to have DVTs. Today her labs are notable for hyperglycemia, glucose is greater than 700. She is not sure if she took her insulin. --refer to ED for management of hyperglycemia --postpone chemotherapy to next week --DVT - c/w Eliquis --some confusion on exam - recommend CT head in ED --she needs more home care support in order to continue with treatment outpatient as she seem to not be able to manage all her comorbidities --reviewed chart, operative note from SRS, labs, imaging --ordered labs --Discussed with Dr.D'Amico --high complexity decision making regarding hospitalization for hyperglycemia

## 2024-08-16 PROBLEM — M85.80 OTHER SPECIFIED DISORDERS OF BONE DENSITY AND STRUCTURE, UNSPECIFIED SITE: Chronic | Status: INACTIVE | Noted: 2022-02-23 | Resolved: 2024-08-15

## 2024-08-16 PROBLEM — N18.4 CHRONIC KIDNEY DISEASE, STAGE 4 (SEVERE): Chronic | Status: INACTIVE | Noted: 2022-02-23 | Resolved: 2024-08-15

## 2024-08-16 LAB
ANION GAP SERPL CALC-SCNC: 8 MMOL/L — SIGNIFICANT CHANGE UP (ref 5–17)
APPEARANCE UR: CLEAR — SIGNIFICANT CHANGE UP
BACTERIA # UR AUTO: ABNORMAL /HPF
BILIRUB UR-MCNC: NEGATIVE — SIGNIFICANT CHANGE UP
BUN SERPL-MCNC: 26 MG/DL — HIGH (ref 7–23)
CALCIUM SERPL-MCNC: 9.4 MG/DL — SIGNIFICANT CHANGE UP (ref 8.4–10.5)
CHLORIDE SERPL-SCNC: 103 MMOL/L — SIGNIFICANT CHANGE UP (ref 96–108)
CO2 SERPL-SCNC: 26 MMOL/L — SIGNIFICANT CHANGE UP (ref 22–31)
COLOR SPEC: YELLOW — SIGNIFICANT CHANGE UP
CREAT ?TM UR-MCNC: 22 MG/DL — SIGNIFICANT CHANGE UP
CREAT SERPL-MCNC: 1.32 MG/DL — HIGH (ref 0.5–1.3)
DIFF PNL FLD: NEGATIVE — SIGNIFICANT CHANGE UP
EGFR: 42 ML/MIN/1.73M2 — LOW
EGFR: 42 ML/MIN/1.73M2 — LOW
GLUCOSE BLDC GLUCOMTR-MCNC: 141 MG/DL — HIGH (ref 70–99)
GLUCOSE BLDC GLUCOMTR-MCNC: 298 MG/DL — HIGH (ref 70–99)
GLUCOSE BLDC GLUCOMTR-MCNC: 56 MG/DL — LOW (ref 70–99)
GLUCOSE BLDC GLUCOMTR-MCNC: 59 MG/DL — LOW (ref 70–99)
GLUCOSE BLDC GLUCOMTR-MCNC: 91 MG/DL — SIGNIFICANT CHANGE UP (ref 70–99)
GLUCOSE BLDC GLUCOMTR-MCNC: 99 MG/DL — SIGNIFICANT CHANGE UP (ref 70–99)
GLUCOSE SERPL-MCNC: 207 MG/DL — HIGH (ref 70–99)
GLUCOSE UR QL: >=1000 MG/DL
HCT VFR BLD CALC: 33.9 % — LOW (ref 34.5–45)
HGB BLD-MCNC: 10.9 G/DL — LOW (ref 11.5–15.5)
KETONES UR-MCNC: NEGATIVE MG/DL — SIGNIFICANT CHANGE UP
LEUKOCYTE ESTERASE UR-ACNC: NEGATIVE — SIGNIFICANT CHANGE UP
MAGNESIUM SERPL-MCNC: 1.4 MG/DL — LOW (ref 1.6–2.6)
MCHC RBC-ENTMCNC: 27.8 PG — SIGNIFICANT CHANGE UP (ref 27–34)
MCHC RBC-ENTMCNC: 32.2 GM/DL — SIGNIFICANT CHANGE UP (ref 32–36)
MCV RBC AUTO: 86.5 FL — SIGNIFICANT CHANGE UP (ref 80–100)
NITRITE UR-MCNC: NEGATIVE — SIGNIFICANT CHANGE UP
NRBC # BLD: 0 /100 WBCS — SIGNIFICANT CHANGE UP (ref 0–0)
NRBC BLD-RTO: 0 /100 WBCS — SIGNIFICANT CHANGE UP (ref 0–0)
OSMOLALITY UR: 409 MOSM/KG — SIGNIFICANT CHANGE UP (ref 300–900)
PH UR: 6.5 — SIGNIFICANT CHANGE UP (ref 5–8)
PHOSPHATE SERPL-MCNC: 2.9 MG/DL — SIGNIFICANT CHANGE UP (ref 2.5–4.5)
PLATELET # BLD AUTO: 227 K/UL — SIGNIFICANT CHANGE UP (ref 150–400)
POTASSIUM SERPL-MCNC: 3.6 MMOL/L — SIGNIFICANT CHANGE UP (ref 3.5–5.3)
POTASSIUM SERPL-SCNC: 3.6 MMOL/L — SIGNIFICANT CHANGE UP (ref 3.5–5.3)
POTASSIUM UR-SCNC: 16 MMOL/L — SIGNIFICANT CHANGE UP
PROT ?TM UR-MCNC: 199 MG/DL — HIGH (ref 0–12)
PROT UR-MCNC: 300 MG/DL
PROT/CREAT UR-RTO: 9 RATIO — HIGH (ref 0–0.2)
RBC # BLD: 3.92 M/UL — SIGNIFICANT CHANGE UP (ref 3.8–5.2)
RBC # FLD: 14.3 % — SIGNIFICANT CHANGE UP (ref 10.3–14.5)
RBC CASTS # UR COMP ASSIST: 0 /HPF — SIGNIFICANT CHANGE UP (ref 0–4)
SODIUM SERPL-SCNC: 137 MMOL/L — SIGNIFICANT CHANGE UP (ref 135–145)
SODIUM UR-SCNC: 79 MMOL/L — SIGNIFICANT CHANGE UP
SP GR SPEC: 1.02 — SIGNIFICANT CHANGE UP (ref 1–1.03)
SQUAMOUS # UR AUTO: 1 /HPF — SIGNIFICANT CHANGE UP (ref 0–5)
UROBILINOGEN FLD QL: 0.2 MG/DL — SIGNIFICANT CHANGE UP (ref 0.2–1)
UUN UR-MCNC: 302 MG/DL — SIGNIFICANT CHANGE UP
WBC # BLD: 14.67 K/UL — HIGH (ref 3.8–10.5)
WBC # FLD AUTO: 14.67 K/UL — HIGH (ref 3.8–10.5)
WBC UR QL: 7 /HPF — HIGH (ref 0–5)

## 2024-08-16 PROCEDURE — 99222 1ST HOSP IP/OBS MODERATE 55: CPT | Mod: GC

## 2024-08-16 PROCEDURE — 99233 SBSQ HOSP IP/OBS HIGH 50: CPT | Mod: GC

## 2024-08-16 RX ORDER — DEXTROSE 50 % IN WATER 50 %
15 SYRINGE (ML) INTRAVENOUS ONCE
Refills: 0 | Status: DISCONTINUED | OUTPATIENT
Start: 2024-08-16 | End: 2024-08-17

## 2024-08-16 RX ORDER — INSULIN GLARGINE-YFGN 100 [IU]/ML
28 INJECTION, SOLUTION SUBCUTANEOUS AT BEDTIME
Refills: 0 | Status: DISCONTINUED | OUTPATIENT
Start: 2024-08-16 | End: 2024-08-17

## 2024-08-16 RX ORDER — DEXTROSE 50 % IN WATER 50 %
15 SYRINGE (ML) INTRAVENOUS ONCE
Refills: 0 | Status: COMPLETED | OUTPATIENT
Start: 2024-08-16 | End: 2024-08-16

## 2024-08-16 RX ORDER — INSULIN LISPRO 100 U/ML
15 INJECTION, SOLUTION INTRAVENOUS; SUBCUTANEOUS
Refills: 0 | Status: DISCONTINUED | OUTPATIENT
Start: 2024-08-16 | End: 2024-08-17

## 2024-08-16 RX ORDER — MAGNESIUM OXIDE 400 MG
800 TABLET ORAL ONCE
Refills: 0 | Status: COMPLETED | OUTPATIENT
Start: 2024-08-16 | End: 2024-08-16

## 2024-08-16 RX ORDER — MAGNESIUM SULFATE 500 MG/ML
4 SYRINGE (ML) INJECTION ONCE
Refills: 0 | Status: COMPLETED | OUTPATIENT
Start: 2024-08-16 | End: 2024-08-16

## 2024-08-16 RX ADMIN — INSULIN LISPRO 18 UNIT(S): 100 INJECTION, SOLUTION INTRAVENOUS; SUBCUTANEOUS at 12:52

## 2024-08-16 RX ADMIN — APIXABAN 5 MILLIGRAM(S): 2.5 TABLET, FILM COATED ORAL at 09:24

## 2024-08-16 RX ADMIN — INSULIN GLARGINE-YFGN 28 UNIT(S): 100 INJECTION, SOLUTION SUBCUTANEOUS at 23:21

## 2024-08-16 RX ADMIN — ATORVASTATIN CALCIUM 40 MILLIGRAM(S): 80 TABLET, FILM COATED ORAL at 21:34

## 2024-08-16 RX ADMIN — Medication 15 GRAM(S): at 19:18

## 2024-08-16 RX ADMIN — Medication 60 MILLIGRAM(S): at 21:04

## 2024-08-16 RX ADMIN — INSULIN LISPRO 18 UNIT(S): 100 INJECTION, SOLUTION INTRAVENOUS; SUBCUTANEOUS at 09:38

## 2024-08-16 RX ADMIN — APIXABAN 5 MILLIGRAM(S): 2.5 TABLET, FILM COATED ORAL at 21:04

## 2024-08-16 RX ADMIN — Medication 800 MILLIGRAM(S): at 12:52

## 2024-08-16 RX ADMIN — INSULIN LISPRO 6: 100 INJECTION, SOLUTION INTRAVENOUS; SUBCUTANEOUS at 23:15

## 2024-08-17 ENCOUNTER — TRANSCRIPTION ENCOUNTER (OUTPATIENT)
Age: 74
End: 2024-08-17

## 2024-08-17 ENCOUNTER — NON-APPOINTMENT (OUTPATIENT)
Age: 74
End: 2024-08-17

## 2024-08-17 VITALS
OXYGEN SATURATION: 97 % | DIASTOLIC BLOOD PRESSURE: 83 MMHG | SYSTOLIC BLOOD PRESSURE: 147 MMHG | HEART RATE: 68 BPM | RESPIRATION RATE: 17 BRPM | TEMPERATURE: 98 F

## 2024-08-17 LAB
ANION GAP SERPL CALC-SCNC: 9 MMOL/L — SIGNIFICANT CHANGE UP (ref 5–17)
BUN SERPL-MCNC: 27 MG/DL — HIGH (ref 7–23)
CALCIUM SERPL-MCNC: 9 MG/DL — SIGNIFICANT CHANGE UP (ref 8.4–10.5)
CHLORIDE SERPL-SCNC: 104 MMOL/L — SIGNIFICANT CHANGE UP (ref 96–108)
CO2 SERPL-SCNC: 26 MMOL/L — SIGNIFICANT CHANGE UP (ref 22–31)
CREAT SERPL-MCNC: 1.4 MG/DL — HIGH (ref 0.5–1.3)
EGFR: 39 ML/MIN/1.73M2 — LOW
EGFR: 39 ML/MIN/1.73M2 — LOW
GLUCOSE BLDC GLUCOMTR-MCNC: 199 MG/DL — HIGH (ref 70–99)
GLUCOSE BLDC GLUCOMTR-MCNC: 224 MG/DL — HIGH (ref 70–99)
GLUCOSE BLDC GLUCOMTR-MCNC: 63 MG/DL — LOW (ref 70–99)
GLUCOSE SERPL-MCNC: 121 MG/DL — HIGH (ref 70–99)
HCT VFR BLD CALC: 37.1 % — SIGNIFICANT CHANGE UP (ref 34.5–45)
HGB BLD-MCNC: 11.6 G/DL — SIGNIFICANT CHANGE UP (ref 11.5–15.5)
MAGNESIUM SERPL-MCNC: 1.4 MG/DL — LOW (ref 1.6–2.6)
MCHC RBC-ENTMCNC: 27.6 PG — SIGNIFICANT CHANGE UP (ref 27–34)
MCHC RBC-ENTMCNC: 31.3 GM/DL — LOW (ref 32–36)
MCV RBC AUTO: 88.1 FL — SIGNIFICANT CHANGE UP (ref 80–100)
NRBC # BLD: 0 /100 WBCS — SIGNIFICANT CHANGE UP (ref 0–0)
NRBC BLD-RTO: 0 /100 WBCS — SIGNIFICANT CHANGE UP (ref 0–0)
PHOSPHATE SERPL-MCNC: 2.1 MG/DL — LOW (ref 2.5–4.5)
PLATELET # BLD AUTO: 235 K/UL — SIGNIFICANT CHANGE UP (ref 150–400)
POTASSIUM SERPL-MCNC: 3.5 MMOL/L — SIGNIFICANT CHANGE UP (ref 3.5–5.3)
POTASSIUM SERPL-SCNC: 3.5 MMOL/L — SIGNIFICANT CHANGE UP (ref 3.5–5.3)
RBC # BLD: 4.21 M/UL — SIGNIFICANT CHANGE UP (ref 3.8–5.2)
RBC # FLD: 14.5 % — SIGNIFICANT CHANGE UP (ref 10.3–14.5)
SODIUM SERPL-SCNC: 139 MMOL/L — SIGNIFICANT CHANGE UP (ref 135–145)
WBC # BLD: 9.5 K/UL — SIGNIFICANT CHANGE UP (ref 3.8–10.5)
WBC # FLD AUTO: 9.5 K/UL — SIGNIFICANT CHANGE UP (ref 3.8–10.5)

## 2024-08-17 PROCEDURE — 83735 ASSAY OF MAGNESIUM: CPT

## 2024-08-17 PROCEDURE — 84540 ASSAY OF URINE/UREA-N: CPT

## 2024-08-17 PROCEDURE — 84681 ASSAY OF C-PEPTIDE: CPT

## 2024-08-17 PROCEDURE — 82570 ASSAY OF URINE CREATININE: CPT

## 2024-08-17 PROCEDURE — 84300 ASSAY OF URINE SODIUM: CPT

## 2024-08-17 PROCEDURE — 84132 ASSAY OF SERUM POTASSIUM: CPT

## 2024-08-17 PROCEDURE — 86900 BLOOD TYPING SEROLOGIC ABO: CPT

## 2024-08-17 PROCEDURE — 82010 KETONE BODYS QUAN: CPT

## 2024-08-17 PROCEDURE — 93970 EXTREMITY STUDY: CPT

## 2024-08-17 PROCEDURE — 86901 BLOOD TYPING SEROLOGIC RH(D): CPT

## 2024-08-17 PROCEDURE — 82962 GLUCOSE BLOOD TEST: CPT

## 2024-08-17 PROCEDURE — 96374 THER/PROPH/DIAG INJ IV PUSH: CPT

## 2024-08-17 PROCEDURE — 84156 ASSAY OF PROTEIN URINE: CPT

## 2024-08-17 PROCEDURE — 85025 COMPLETE CBC W/AUTO DIFF WBC: CPT

## 2024-08-17 PROCEDURE — 85027 COMPLETE CBC AUTOMATED: CPT

## 2024-08-17 PROCEDURE — 80048 BASIC METABOLIC PNL TOTAL CA: CPT

## 2024-08-17 PROCEDURE — 36415 COLL VENOUS BLD VENIPUNCTURE: CPT

## 2024-08-17 PROCEDURE — 84295 ASSAY OF SERUM SODIUM: CPT

## 2024-08-17 PROCEDURE — 82803 BLOOD GASES ANY COMBINATION: CPT

## 2024-08-17 PROCEDURE — 81001 URINALYSIS AUTO W/SCOPE: CPT

## 2024-08-17 PROCEDURE — 86850 RBC ANTIBODY SCREEN: CPT

## 2024-08-17 PROCEDURE — 84133 ASSAY OF URINE POTASSIUM: CPT

## 2024-08-17 PROCEDURE — 82330 ASSAY OF CALCIUM: CPT

## 2024-08-17 PROCEDURE — 83930 ASSAY OF BLOOD OSMOLALITY: CPT

## 2024-08-17 PROCEDURE — 84100 ASSAY OF PHOSPHORUS: CPT

## 2024-08-17 PROCEDURE — 80053 COMPREHEN METABOLIC PANEL: CPT

## 2024-08-17 PROCEDURE — 83935 ASSAY OF URINE OSMOLALITY: CPT

## 2024-08-17 PROCEDURE — 99285 EMERGENCY DEPT VISIT HI MDM: CPT | Mod: 25

## 2024-08-17 PROCEDURE — 93970 EXTREMITY STUDY: CPT | Mod: 26

## 2024-08-17 PROCEDURE — 99233 SBSQ HOSP IP/OBS HIGH 50: CPT | Mod: GC

## 2024-08-17 RX ORDER — SOD PHOS DI, MONO/K PHOS MONO 250 MG
1 TABLET ORAL ONCE
Refills: 0 | Status: DISCONTINUED | OUTPATIENT
Start: 2024-08-17 | End: 2024-08-17

## 2024-08-17 RX ORDER — MAGNESIUM SULFATE 500 MG/ML
2 SYRINGE (ML) INJECTION ONCE
Refills: 0 | Status: DISCONTINUED | OUTPATIENT
Start: 2024-08-17 | End: 2024-08-17

## 2024-08-17 RX ORDER — INSULIN GLARGINE-YFGN 100 [IU]/ML
25 INJECTION, SOLUTION SUBCUTANEOUS
Qty: 1 | Refills: 0
Start: 2024-08-17 | End: 2024-09-15

## 2024-08-17 RX ORDER — INSULIN LISPRO 100 U/ML
14 INJECTION, SOLUTION INTRAVENOUS; SUBCUTANEOUS
Qty: 1 | Refills: 0
Start: 2024-08-17 | End: 2024-09-15

## 2024-08-17 RX ORDER — SOD PHOS DI, MONO/K PHOS MONO 250 MG
2 TABLET ORAL
Refills: 0 | Status: DISCONTINUED | OUTPATIENT
Start: 2024-08-17 | End: 2024-08-17

## 2024-08-17 RX ORDER — MAGNESIUM OXIDE 400 MG
800 TABLET ORAL ONCE
Refills: 0 | Status: DISCONTINUED | OUTPATIENT
Start: 2024-08-17 | End: 2024-08-17

## 2024-08-17 RX ORDER — INSULIN GLARGINE 100 [IU]/ML
23 INJECTION, SOLUTION SUBCUTANEOUS
Qty: 1 | Refills: 0 | DISCHARGE
Start: 2024-08-17 | End: 2024-09-15

## 2024-08-17 RX ADMIN — APIXABAN 5 MILLIGRAM(S): 2.5 TABLET, FILM COATED ORAL at 10:25

## 2024-08-17 RX ADMIN — Medication 2 PACKET(S): at 10:55

## 2024-08-17 RX ADMIN — INSULIN LISPRO 15 UNIT(S): 100 INJECTION, SOLUTION INTRAVENOUS; SUBCUTANEOUS at 10:22

## 2024-08-17 RX ADMIN — INSULIN LISPRO 2: 100 INJECTION, SOLUTION INTRAVENOUS; SUBCUTANEOUS at 10:22

## 2024-08-19 LAB — C PEPTIDE SERPL-MCNC: 1.8 NG/ML — SIGNIFICANT CHANGE UP (ref 1.1–4.4)

## 2024-08-20 ENCOUNTER — APPOINTMENT (OUTPATIENT)
Dept: HOME HEALTH SERVICES | Facility: HOME HEALTH | Age: 74
End: 2024-08-20
Payer: MEDICARE

## 2024-08-20 ENCOUNTER — APPOINTMENT (OUTPATIENT)
Dept: NEUROSURGERY | Facility: CLINIC | Age: 74
End: 2024-08-20

## 2024-08-20 VITALS
TEMPERATURE: 98.3 F | DIASTOLIC BLOOD PRESSURE: 62 MMHG | HEIGHT: 63 IN | RESPIRATION RATE: 20 BRPM | HEART RATE: 78 BPM | SYSTOLIC BLOOD PRESSURE: 128 MMHG | OXYGEN SATURATION: 99 %

## 2024-08-20 DIAGNOSIS — Z96.649 DISLOCATION OF OTHER INTERNAL JOINT PROSTHESIS, INITIAL ENCOUNTER: ICD-10-CM

## 2024-08-20 DIAGNOSIS — R29.6 REPEATED FALLS: ICD-10-CM

## 2024-08-20 DIAGNOSIS — Z65.8 OTHER SPECIFIED PROBLEMS RELATED TO PSYCHOSOCIAL CIRCUMSTANCES: ICD-10-CM

## 2024-08-20 DIAGNOSIS — T84.028A DISLOCATION OF OTHER INTERNAL JOINT PROSTHESIS, INITIAL ENCOUNTER: ICD-10-CM

## 2024-08-20 DIAGNOSIS — R53.81 OTHER MALAISE: ICD-10-CM

## 2024-08-20 DIAGNOSIS — R68.89 OTHER GENERAL SYMPTOMS AND SIGNS: ICD-10-CM

## 2024-08-20 DIAGNOSIS — S02.401A MAXILLARY FRACTURE, UNSPECIFIED SIDE, INITIAL ENCOUNTER FOR CLOSED FRACTURE: ICD-10-CM

## 2024-08-20 PROBLEM — N18.30 CHRONIC KIDNEY DISEASE, STAGE 3 UNSPECIFIED: Chronic | Status: ACTIVE | Noted: 2024-08-15

## 2024-08-20 PROCEDURE — 99496 TRANSJ CARE MGMT HIGH F2F 7D: CPT

## 2024-08-20 PROCEDURE — 99349 HOME/RES VST EST MOD MDM 40: CPT

## 2024-08-20 RX ORDER — ASPIRIN 81 MG/1
81 TABLET, CHEWABLE ORAL
Qty: 30 | Refills: 0 | Status: ACTIVE | COMMUNITY
Start: 2024-06-04

## 2024-08-20 RX ORDER — CHOLECALCIFEROL (VITAMIN D3) 25 MCG
25 TABLET ORAL
Qty: 30 | Refills: 0 | Status: ACTIVE | COMMUNITY
Start: 2024-06-04

## 2024-08-20 RX ORDER — SITAGLIPTIN 50 MG/1
50 TABLET, FILM COATED ORAL
Qty: 30 | Refills: 0 | Status: ACTIVE | COMMUNITY
Start: 2023-10-05

## 2024-08-20 NOTE — CURRENT MEDS
[Non adherent to medications] : Patient is non adherent to medications as prescribed [Non adherent to medications as prescribed] : the patient is non adherent to medications as prescribed [Medication and Allergies Reconciled] : medication and allergies reconciled [High Risk Medications Reviewed and Reconciled (Beers Criteria)] : high risk medications reviewed and reconciled [Reviewed patient reported medication adherence from Comprehensive Assessment] : Reviewed patient reported medication adherence from comprehensive assessment

## 2024-08-20 NOTE — CHRONIC CARE ASSESSMENT
[Current] : Current Pain: [Oriented To Person] : ~L oriented to person [Oriented To Place] : ~L oriented to place [Oriented To Time] : ~L oriented to time [Reviewed Mini-Cog Outcomes from Comprehensive Assessment] : Reviewed Mini-Cog outcomes from comprehensive assessment [Reviewed depression screen from comprehensive assessment] : Reviewed depression screen from comprehensive assessment [Reviewed Home Safety Evaluation] : Reviewed home safety evaluation [Uses glasses] : uses glasses [Inadequate social support] : inadequate social support [Limited decision making ability] : limited decision making ability [Less than 3 Times Per Week] : exercises less than 3 times per week [Walking] : walking [Diabetic Diet] : diabetic [Low Fat Diet] : low fat [Low Carb Diet] : low carbohydrate [Low Salt Diet] : low salt [General Adherence] : and is generally adherent [PPS Score: ____] : Palliative Performance Scale (PPS) Score: [unfilled] [Oriented To Situation] : not ~L oriented to situation [Alert] : not ~L alert [Disorientation] : no disorientation was observed [Non-responsive] : responsiveness  [Lethargic] : not lethargic [Stupor] : no stupor [Over the Past 2 Weeks, Have You Felt Down, Depressed, or Hopeless?] : 1.) Over the past 2 weeks, have you felt down, depressed, or hopeless? No [Over the Past 2 Weeks, Have You Felt Little Interest or Pleasure Doing Things?] : 2.) Over the past 2 weeks, have you felt little interest or pleasure doing things? No [Safety elements used in home] : No safety elements used in home [Reports changes in hearing] : reports no changes in hearing [de-identified] : 0 [FreeTextEntry2] : periods of forgetfulness [de-identified] : DM2 uncontrolled on insulin, bell palsy, balance impairment/multiple falls (last fall 08/15/24),  Right hip joint replacement, herniated disc, Non small cell Lung CA  statue post  radiation metastatic to brain currently undergoing chemo therapy once a week,  CKD stage 3, Osteopenia, osteoporosis, Rheumatoid arthritis, venous insufficiency, memory declined  [de-identified] : walks as tolerated [de-identified] : ADD diet, low sodium, low carb, low fat, renal diet [FreeTextEntry1] : renal diet

## 2024-08-20 NOTE — HISTORY OF PRESENT ILLNESS
[FreeTextEntry1] : 75 y/o female, former smoker, with PMHx of CKD, DM, RA, HTN, metastatic NSCLC who presents today for 1 month post SRS follow- up.    In brief: - Pt presented with 1 week of daily falls with lower extremity weakness. Admitted to [facility] and MRI brain showed 0.9 cm right frontal lobe mass with surrounding vasogenic edema. CT C/A/P revealed left upper lobe mass 2.6 x 4.1 cm in posterior aspect of left upper lobe abutting major fissure. The mass extends to adjacent left hilum and laterally to left lateral pleura. 1.5 cm left lobe of liver lesion. 1.2 cm pancreatic body cystic lesion. - 7/14/2023 bronchoscopy-lingular biopsy poorly differentiated adenocarcinoma, positive TTF-1, TMB 17.3, PD-L1 negative. - 8/2023: [place] [facility] [place] her first chemotherapy cycle, Taxol/pembrolizumab only due to carboplatin shortage. Taxol was given because her labs there showed a GFR<45, excluding her from being able to receive pemetrexed - 8/15/23 completed 27 Gy / 27 Gy to the right brain - 9/5/2023: [place] [facility]: Left upper lobe perihilar hypermetabolic mass consistent with biopsy-proven malignancy. Mildly FDG avid right thyroid nodule. - 9/7/2023: Received cycle 1 paclitaxel/pembrolizumab [place] [facility] [place]Cr 1.2 there) - 10/10/2023 MRI brain: Since 7/11/2023, right posterior frontal enhancing lesion and vasogenic edema are completely resolved; no new enhancing lesions. - 2/6/2024: CT C/A/P: Since July 11, 2023, significantly decreased left upper lobe lesion. No suspicious lymphadenopathy. No new disease in the chest abdomen or pelvis. - 2/23/2024: MRI Brain: No evidence of abnormal enhancement to suggest residual/recurrent metastatic disease. Chronic lacunar infarctions. Small right temporal occipital chronic infarction. Tiny left cerebellar chronic infarction. Microvascular disease. - 6/14/2024: CT C/A/P: Interval increase in left upper lobe pulmonary lesion. Suggestion of increase in left hepatic lesion, likely metastatic. Suggestion of increased soft tissue density and enlargement of cervix, not well visualized on this exam. New fluid collection in the subcutaneous tissues of the left gluteal region may represent an evolving hematoma from patient's recent fall in May 2024. - 6/14/2024 MRI brain: New development of peripherally enhancing lesions c/w with metastasis.  - 6/24/24 pt presented to Dr. D'Amico for evaluation who planned for SRS - 7/23/24 s/p SRS to right parietal and left parietal brain metastases.   Returns TODAY for 1 month post treatment follow- up.    [facility]onc ([facility] Dr. Cathie Jeffrey NeuroOnc: Sedrick Delaney Radonc: Gabriella Wernicke

## 2024-08-20 NOTE — REVIEW OF SYSTEMS
[Lower Ext Edema] : lower extremity edema [Joint Pain] : joint pain [Unsteady Walking] : ataxia [Negative] : Heme/Lymph [Chest Pain] : no chest pain [Palpitations] : no palpitations [Leg Claudication] : no leg claudication [Orthopnea] : no orthopnea [Paroxysmal Nocturnal Dyspnea] : no paroxysmal nocturnal dyspnea [Joint Stiffness] : no joint stiffness [Joint Swelling] : no joint swelling [Muscle Weakness] : no muscle weakness [Muscle Pain] : no muscle pain [Back Pain] : no back pain [Headache] : no headache [Dizziness] : no dizziness [Fainting] : no fainting [Confusion] : no confusion [Memory Loss] : no memory loss [FreeTextEntry5] : right leg swelling +2 )(chronic) [FreeTextEntry9] : OA with joint pain [de-identified] : using rollator at home and outside, mobility impairment

## 2024-08-20 NOTE — COUNSELING
[Overweight - ( BMI 25.1 - 29.9 )] : overweight -  ( BMI 25.1 - 29.9 ) [DASH diet recommended] : DASH diet recommended [] : foot exam [Patient not on disease-modifying anti-rheumatic drug due to overall prognosis] : Patient not on disease-modifying anti-rheumatic drug due to overall prognosis [Completed] : Aspirin use discussion completed [Full Code] : Code Status: Full Code [No Limitations] : Treatment Guidelines: No limitations [Long Term Intubation] : Intubation: Long term intubation [FreeTextEntry2] : advice needs for medical alert  [Obese (BMI >29.9)] : Obese - BMI >29.9 [DASH diet given] : DASH diet given [Sodium restriction 2gm recommended] : Sodium restriction 2 gm recommended [Non - Smoker] : non-smoker [Medical alert] : medical alert [Use assistive device to avoid falls] : use assistive device to avoid falls [Remove clutter and unsafe carpeting to avoid falls] : remove clutter and unsafe carpeting to avoid falls [Use grab bars] : use grab bars [Smoke/CO Detectors] : smoke/CO detectors [Bone Density] : Bone Density [Mammogram] : Mammogram [Lung Cancer Screening in qualified smokers] : Lung Canser Screening [Colon Cancer Screening] : Colon Cancer Screening [FreeTextEntry3] : renal diet [Improve mobility] : improve mobility [Decrease hospital use] : decrease hospital use [Minimize unnecessary interventions] : minimize unnecessary interventions [Maintain functional ability] : maintain functional ability [Discussed disease trajectory with patient/caregiver] : discussed disease trajectory with patient/caregiver

## 2024-08-20 NOTE — COMPREHENSIVE ASSESSMENT
[Comprehensive Assessment Reviewed] : Comprehensive assessment reviewed
Implemented All Universal Safety Interventions:  [place] to call system. Call bell, personal items and telephone within reach. Instruct patient to call for assistance. Room bathroom lighting operational. Non-slip footwear when patient is off stretcher. Physically safe environment: no spills, clutter or unnecessary equipment. Stretcher in lowest position, wheels locked, appropriate side rails in place.

## 2024-08-20 NOTE — REASON FOR VISIT
[Post-hospitalization from ___ Hospital] : Post-hospitalization from [unfilled] Hospital [Admitted on: ___] : The patient was admitted on [unfilled] [Discharged on ___] : discharged on [unfilled] [Discharge Summary] : discharge summary [Discharge Med List] : discharge medication list [Med Reconciliation] : medication reconciliation has been completed [Patient Contacted By: ____] : and contacted by [unfilled] [Post Hospitalization] : a post hospitalization visit [Family Member] : family member [Formal Caregiver] : formal caregiver [Other: _____] : [unfilled] [Pre-Visit Preparation] : pre-visit preparation was done [Intercurrent Specialty/Sub-specialty Visits] : the patient has intercurrent specialty/sub-specialty visits [FreeTextEntry4] : chart reviewed [FreeTextEntry5] : hemoncology, oncology, cardiology, endocrinology,, neurosurgery,  rheumatology, [FreeTextEntry2] : chart reviewed [FreeTextEntry3] : hemoncology, oncology, cardiology, endocrinology,, neurosurgery,  rheumatology,   hematology, ONCOLOGY, CARDIOLOGY, endocrinology,

## 2024-08-20 NOTE — HEALTH RISK ASSESSMENT
[Yes] : The patient has visual impairment [Independent] : using telephone [Two or more falls in past year] : Patient reported two or more falls in the past year [HRA Reviewed] : Health risk assessment reviewed [Some assistance needed] : toileting [[facility] 6 [FreeTextEntry2] : use rollator in and outside home, mobility impairment, multiple falls.  [FreeTextEntry8] : use rollator

## 2024-08-20 NOTE — HISTORY OF PRESENT ILLNESS
[House Calls Co-Management Patient] : [unfilled] is a House Calls co-management patient [In-Place] : has aide services in-place [Patient is stable] : patient is stable [Education provided] : education provided [Patient] : patient [Family Member] : family member [Formal Caregiver] : formal caregiver [LastPCPVisitDate] : 05/24 [FreeTextEntry4] : hemoncology, oncology, cardiology, endocrinology,, neurosurgery,  [FreeTextEntry1] : Not homebound [FreeTextEntry2] : 08/20/2024 COVID SCREEN: Patient or caretaker denies fever, cough, trouble breathing, rash, vomiting. Patient has not been in close contact with anyone who is COVID-19 positive, or suspected of having COVID-19.  N95 mask, gloves, eye wear and gown (if indicated) used during visit: Yes.  Total face to face time with patient is 45 min.  MICHAEL READ is an 74 year with PMHx DM2 uncontrolled on insulin, bell palsy, balance impairment/multiple falls (last fall 08/15/24),  Right hip joint replacement, herniated disc, HLD, HTN, Non small cell Lung CA  statue post radiation metastatic to brain currently undergoing chemo therapy once a week, Osteoarthritis, obesity, CKD stage 3, Osteopenia, osteoporosis, Rheumatoid arthritis, venous insufficiency, UTI, falls, influenza A, memory declined  seen today post hospitalization home visit  Patient was HHA/caregiver and spoke to daughter on the phone during the visit.  Hospitalizations - [facility] 05/06/24 following a fall. Hospital diagnosis closed fracture of right orbital floor and maxilla. Was discharged to [facility] for rehab on 05/08/24. discharged home on 06/05/24. - [facility] for another fall, d/c 8/1 - Sent to ED by Housecall PCP during a visit on 8/5/24 to [facility]  for confusion/not safe at home due to missed meds, concern for a fib, dx with hyperglycemia, received insulin, did not know why she was there, and was sent home 08/05/24 -On 08/15/24 Admission to [facility] for s/p fall and hyperglycemia and discharge home on 08/17/24.  Post hospitalization, patient reports Feeling better today. Has been taking insulin and all medications. Reports sometimes forget PM insulin.  Daughter notes pt may qualify for another home care program. Daughter reports was notified patient will be assess by RN for increase HHA hours tomorrow 08/21/24.   Patient/ patient's caregiver reports no weight loss >10 lbs in the past 6 months. No changes in dentition or swallowing reported, No changes in hearing or vision reported. Daughter reports changes in Cognition. Daughter reports last Wednesday, patient went to infusion center at 10pm forgetting was night time. Patient denies any symptoms of depression or anxiety. Patient is ADL independent/dependent and IADL independent/dependent. No changes in gait or falls reported.  Patient's home environment is safe.  Goals of care discussed 10min. Full Code with no limitations.

## 2024-08-20 NOTE — HISTORY OF PRESENT ILLNESS
[FreeTextEntry1] : 73 y/o female, former smoker, with PMHx of CKD, DM, RA, HTN, metastatic NSCLC who presents today for 1 month post SRS follow- up.    In brief: - Pt presented with 1 week of daily falls with lower extremity weakness. Admitted to [facility] and MRI brain showed 0.9 cm right frontal lobe mass with surrounding vasogenic edema. CT C/A/P revealed left upper lobe mass 2.6 x 4.1 cm in posterior aspect of left upper lobe abutting major fissure. The mass extends to adjacent left hilum and laterally to left lateral pleura. 1.5 cm left lobe of liver lesion. 1.2 cm pancreatic body cystic lesion. - 7/14/2023 bronchoscopy-lingular biopsy poorly differentiated adenocarcinoma, positive TTF-1, TMB 17.3, PD-L1 negative. - 8/2023: She followed up at [facility] [place] where she received her first chemotherapy cycle, Taxol/pembrolizumab only due to carboplatin shortage. Taxol was given because her labs there showed a GFR<45, excluding her from being able to receive pemetrexed - 8/15/23 completed 27 Gy / 27 Gy to the right brain - 9/5/2023: PET at [facility]: Left upper lobe perihilar hypermetabolic mass consistent with biopsy-proven malignancy. Mildly FDG avid right thyroid nodule. - 9/7/2023: Received cycle 1 paclitaxel/pembrolizumab at [facility] (Cr 1.2 there) - 10/10/2023 MRI brain: Since 7/11/2023, right posterior frontal enhancing lesion and vasogenic edema are completely resolved; no new enhancing lesions. - 2/6/2024: CT C/A/P: Since July 11, 2023, significantly decreased left upper lobe lesion. No suspicious lymphadenopathy. No new disease in the chest abdomen or pelvis. - 2/23/2024: MRI Brain: No evidence of abnormal enhancement to suggest residual/recurrent metastatic disease. Chronic lacunar infarctions. Small right temporal occipital chronic infarction. Tiny left cerebellar chronic infarction. Microvascular disease. - 6/14/2024: CT C/A/P: Interval increase in left upper lobe pulmonary lesion. Suggestion of increase in left hepatic lesion, likely metastatic. Suggestion of increased soft tissue density and enlargement of cervix, not well visualized on this exam. New fluid collection in the subcutaneous tissues of the left gluteal region may represent an evolving hematoma from patient's recent fall in May 2024. - 6/14/2024 MRI brain: New development of peripherally enhancing lesions c/w with metastasis.  - 6/24/24 pt presented to Dr. D'Amico for evaluation who planned for SRS - 7/23/24 s/p SRS to right parietal and left parietal brain metastases.   Returns TODAY for 1 month post treatment follow- up.    Medonc ([facility]): Dr. Cathie Jeffrey NeuroOnc: Sedrick Delaney Radonc: Gabriella Wernicke

## 2024-08-20 NOTE — REASON FOR VISIT
[Follow-Up: _____] : a [unfilled] follow-up visit [FreeTextEntry1] : metastatic NSCLC s/p SRS 7/23/24. 1 month post treatment follow- up.

## 2024-08-20 NOTE — PHYSICAL EXAM
[Breast Exam Declined] : patient declined to have breast exam done [Normal Bowel Sounds] : normal bowel sounds [Non Tender] : non-tender [Soft] : abdomen soft [Not Distended] : not distended [No Acute Distress] : no acute distress [Normal Voice/Communication] : normal voice communication [Normal Sclera/Conjunctiva] : normal sclera/conjunctiva [EOMI] : extra ocular movement intact [Normal Outer Ear/Nose] : the ears and nose were normal in appearance [Normal Oropharynx] : the oropharynx was normal [Normal TMs] : both tympanic membranes were normal [No JVD] : no jugular venous distention [No LAD] : no lymphadenopathy [No Respiratory Distress] : no respiratory distress [Clear to Auscultation] : lungs were clear to auscultation bilaterally [No Accessory Muscle Use] : no accessory muscle use [Normal Rate] : heart rate was normal  [Regular Rhythm] : with a regular rhythm [Normal S1, S2] : normal S1 and S2 [No Murmurs] : no murmurs heard [Normal Post Cervical Nodes] : no posterior cervical lymphadenopathy [Normal Anterior Cervical Nodes] : no anterior cervical lymphadenopathy [No CVA Tenderness] : no ~M costovertebral angle tenderness [No Rash] : no rash [Cranial Nerves Intact] : cranial nerves 2-12 were intact [Oriented x3] : oriented to person, place, and time [Over the Past 2 Weeks, Have You Felt Down, Depressed, or Hopeless?] : 1.) Over the past 2 weeks, have you felt down, depressed, or hopeless? No [Over the Past 2 Weeks, Have You Felt Little Interest or Pleasure Doing Things?] : 2.) Over the past 2 weeks, have you felt little interest or pleasure doing things? No [Foot Ulcers] : no foot ulcers [de-identified] : female patient walking with walking and unsteady gait [de-identified] : edema right lower extremity +2, left +1 [de-identified] : obese

## 2024-08-22 ENCOUNTER — APPOINTMENT (OUTPATIENT)
Dept: INFUSION THERAPY | Facility: CLINIC | Age: 74
End: 2024-08-22

## 2024-08-22 ENCOUNTER — NON-APPOINTMENT (OUTPATIENT)
Age: 74
End: 2024-08-22

## 2024-08-22 ENCOUNTER — OUTPATIENT (OUTPATIENT)
Dept: OUTPATIENT SERVICES | Facility: HOSPITAL | Age: 74
LOS: 1 days | End: 2024-08-22
Payer: MEDICARE

## 2024-08-22 ENCOUNTER — APPOINTMENT (OUTPATIENT)
Dept: HEMATOLOGY ONCOLOGY | Facility: CLINIC | Age: 74
End: 2024-08-22
Payer: MEDICARE

## 2024-08-22 VITALS
TEMPERATURE: 98 F | SYSTOLIC BLOOD PRESSURE: 165 MMHG | DIASTOLIC BLOOD PRESSURE: 81 MMHG | HEART RATE: 79 BPM | RESPIRATION RATE: 19 BRPM | OXYGEN SATURATION: 95 %

## 2024-08-22 VITALS
TEMPERATURE: 98 F | SYSTOLIC BLOOD PRESSURE: 160 MMHG | HEART RATE: 81 BPM | OXYGEN SATURATION: 96 % | HEIGHT: 63 IN | DIASTOLIC BLOOD PRESSURE: 84 MMHG | RESPIRATION RATE: 18 BRPM | WEIGHT: 139.99 LBS

## 2024-08-22 VITALS
DIASTOLIC BLOOD PRESSURE: 91 MMHG | TEMPERATURE: 98.2 F | RESPIRATION RATE: 14 BRPM | HEIGHT: 63 IN | SYSTOLIC BLOOD PRESSURE: 163 MMHG | WEIGHT: 142 LBS | OXYGEN SATURATION: 98 % | HEART RATE: 76 BPM | BODY MASS INDEX: 25.16 KG/M2

## 2024-08-22 DIAGNOSIS — E11.65 TYPE 2 DIABETES MELLITUS WITH HYPERGLYCEMIA: ICD-10-CM

## 2024-08-22 DIAGNOSIS — C34.90 MALIGNANT NEOPLASM OF UNSPECIFIED PART OF UNSPECIFIED BRONCHUS OR LUNG: ICD-10-CM

## 2024-08-22 DIAGNOSIS — Z79.4 TYPE 2 DIABETES MELLITUS WITH HYPERGLYCEMIA: ICD-10-CM

## 2024-08-22 DIAGNOSIS — Z96.641 PRESENCE OF RIGHT ARTIFICIAL HIP JOINT: Chronic | ICD-10-CM

## 2024-08-22 DIAGNOSIS — M06.9 RHEUMATOID ARTHRITIS, UNSPECIFIED: ICD-10-CM

## 2024-08-22 LAB
ALBUMIN SERPL ELPH-MCNC: 2.7 G/DL
ALP BLD-CCNC: 133 U/L
ALT SERPL-CCNC: 14 U/L
ANION GAP SERPL CALC-SCNC: 5 MMOL/L
AST SERPL-CCNC: 21 U/L
BILIRUB SERPL-MCNC: 0.6 MG/DL
BUN SERPL-MCNC: 23 MG/DL
CALCIUM SERPL-MCNC: 9.5 MG/DL
CHLORIDE SERPL-SCNC: 105 MMOL/L
CO2 SERPL-SCNC: 31 MMOL/L
CREAT SERPL-MCNC: 1.3 MG/DL
EGFR: 43 ML/MIN/1.73M2
GLUCOSE SERPL-MCNC: 214 MG/DL
HCT VFR BLD CALC: 34.2 %
HGB BLD-MCNC: 10.7 G/DL
LYMPHOCYTES # BLD AUTO: 1.5 K/UL
LYMPHOCYTES NFR BLD AUTO: 14.8 %
MAN DIFF?: NO
MCHC RBC-ENTMCNC: 27.2 PG
MCHC RBC-ENTMCNC: 31.3 GM/DL
MCV RBC AUTO: 86.8 FL
NEUTROPHILS # BLD AUTO: 7.8 K/UL
NEUTROPHILS NFR BLD AUTO: 74.5 %
PLATELET # BLD AUTO: 229 K/UL
POTASSIUM SERPL-SCNC: 4.1 MMOL/L
PROT SERPL-MCNC: 7 G/DL
RBC # BLD: 3.94 M/UL
RBC # FLD: 15.1 %
SODIUM SERPL-SCNC: 141 MMOL/L
WBC # FLD AUTO: 10.4 K/UL

## 2024-08-22 PROCEDURE — G2211 COMPLEX E/M VISIT ADD ON: CPT

## 2024-08-22 PROCEDURE — 96375 TX/PRO/DX INJ NEW DRUG ADDON: CPT

## 2024-08-22 PROCEDURE — 99214 OFFICE O/P EST MOD 30 MIN: CPT

## 2024-08-22 PROCEDURE — 96413 CHEMO IV INFUSION 1 HR: CPT

## 2024-08-22 RX ADMIN — SODIUM CHLORIDE 10 MILLILITER(S): 9 INJECTION INTRAMUSCULAR; INTRAVENOUS; SUBCUTANEOUS at 16:28

## 2024-08-22 RX ADMIN — GEMCITABINE 1700 MILLIGRAM(S): 10 INJECTION, SOLUTION INTRAVENOUS at 16:26

## 2024-08-22 RX ADMIN — GEMCITABINE 1700 MILLIGRAM(S): 10 INJECTION, SOLUTION INTRAVENOUS at 15:56

## 2024-08-22 RX ADMIN — PALONOSETRON HYDROCHLORIDE 0.25 MILLIGRAM(S): 0.25 INJECTION INTRAVENOUS at 15:55

## 2024-08-23 LAB — TSH SERPL-ACNC: 0.93 UIU/ML

## 2024-08-24 ENCOUNTER — APPOINTMENT (OUTPATIENT)
Dept: HOME HEALTH SERVICES | Facility: HOME HEALTH | Age: 74
End: 2024-08-24

## 2024-08-24 DIAGNOSIS — Z91.018 ALLERGY TO OTHER FOODS: ICD-10-CM

## 2024-08-24 DIAGNOSIS — E78.5 HYPERLIPIDEMIA, UNSPECIFIED: ICD-10-CM

## 2024-08-24 DIAGNOSIS — Z86.718 PERSONAL HISTORY OF OTHER VENOUS THROMBOSIS AND EMBOLISM: ICD-10-CM

## 2024-08-24 DIAGNOSIS — C34.90 MALIGNANT NEOPLASM OF UNSPECIFIED PART OF UNSPECIFIED BRONCHUS OR LUNG: ICD-10-CM

## 2024-08-24 DIAGNOSIS — Z79.52 LONG TERM (CURRENT) USE OF SYSTEMIC STEROIDS: ICD-10-CM

## 2024-08-24 DIAGNOSIS — E86.0 DEHYDRATION: ICD-10-CM

## 2024-08-24 DIAGNOSIS — T38.3X6A UNDERDOSING OF INSULIN AND ORAL HYPOGLYCEMIC [ANTIDIABETIC] DRUGS, INITIAL ENCOUNTER: ICD-10-CM

## 2024-08-24 DIAGNOSIS — R41.9 UNSPECIFIED SYMPTOMS AND SIGNS INVOLVING COGNITIVE FUNCTIONS AND AWARENESS: ICD-10-CM

## 2024-08-24 DIAGNOSIS — M06.9 RHEUMATOID ARTHRITIS, UNSPECIFIED: ICD-10-CM

## 2024-08-24 DIAGNOSIS — C79.31 SECONDARY MALIGNANT NEOPLASM OF BRAIN: ICD-10-CM

## 2024-08-24 DIAGNOSIS — E11.65 TYPE 2 DIABETES MELLITUS WITH HYPERGLYCEMIA: ICD-10-CM

## 2024-08-24 DIAGNOSIS — Z91.148 PATIENT'S OTHER NONCOMPLIANCE WITH MEDICATION REGIMEN FOR OTHER REASON: ICD-10-CM

## 2024-08-24 DIAGNOSIS — M47.816 SPONDYLOSIS WITHOUT MYELOPATHY OR RADICULOPATHY, LUMBAR REGION: ICD-10-CM

## 2024-08-24 DIAGNOSIS — Z92.21 PERSONAL HISTORY OF ANTINEOPLASTIC CHEMOTHERAPY: ICD-10-CM

## 2024-08-24 DIAGNOSIS — Z96.641 PRESENCE OF RIGHT ARTIFICIAL HIP JOINT: ICD-10-CM

## 2024-08-24 DIAGNOSIS — E11.22 TYPE 2 DIABETES MELLITUS WITH DIABETIC CHRONIC KIDNEY DISEASE: ICD-10-CM

## 2024-08-24 DIAGNOSIS — Z79.4 LONG TERM (CURRENT) USE OF INSULIN: ICD-10-CM

## 2024-08-24 DIAGNOSIS — Z79.01 LONG TERM (CURRENT) USE OF ANTICOAGULANTS: ICD-10-CM

## 2024-08-24 DIAGNOSIS — D63.1 ANEMIA IN CHRONIC KIDNEY DISEASE: ICD-10-CM

## 2024-08-24 DIAGNOSIS — N18.31 CHRONIC KIDNEY DISEASE, STAGE 3A: ICD-10-CM

## 2024-08-24 DIAGNOSIS — N17.9 ACUTE KIDNEY FAILURE, UNSPECIFIED: ICD-10-CM

## 2024-08-24 DIAGNOSIS — R60.9 EDEMA, UNSPECIFIED: ICD-10-CM

## 2024-08-24 DIAGNOSIS — Z88.1 ALLERGY STATUS TO OTHER ANTIBIOTIC AGENTS: ICD-10-CM

## 2024-08-24 DIAGNOSIS — Z92.3 PERSONAL HISTORY OF IRRADIATION: ICD-10-CM

## 2024-08-24 DIAGNOSIS — I12.9 HYPERTENSIVE CHRONIC KIDNEY DISEASE WITH STAGE 1 THROUGH STAGE 4 CHRONIC KIDNEY DISEASE, OR UNSPECIFIED CHRONIC KIDNEY DISEASE: ICD-10-CM

## 2024-08-26 ENCOUNTER — APPOINTMENT (OUTPATIENT)
Dept: ENDOCRINOLOGY | Facility: CLINIC | Age: 74
End: 2024-08-26

## 2024-08-26 NOTE — HISTORY OF PRESENT ILLNESS
[Disease: _____________________] : Disease: [unfilled] [de-identified] : Noemi Minor is a 74-year-old female with CKD, DM, RA, HTN who presents to the clinic for follow up of NSCLC with BMs (PDL1 negative, STK11, PIK3CA, TP53).  Onc Hx: Ex-smoker with history of CKD, baseline creatinine is around 2, diabetes, rheumatoid arthritis, HTN. She was experiencing 1 week of daily falls with lower extremity weakness without dizziness, was admitted to [facility]. MRI brain showed 0.9 cm right frontal lobe mass with surrounding vasogenic edema. CT chest abdomen pelvis revealed left upper lobe mass 2.6 x 4.1 cm in posterior aspect of left upper lobe abutting major fissure. The mass extends to adjacent left hilum and laterally to left lateral pleura. 1.5 cm left lobe of liver lesion. 1.2 cm pancreatic body cystic lesion. 7/14/2023 bronchoscopy-lingular biopsy poorly differentiated adenocarcinoma, positive TTF-1, TMB 17.3, PD-L1 negative. Tier II: Variants of Potential Clinical Significance STK11 p.([id number]) PIK3CA p.([id number]) TP53 p.([id number]) Tier III: Variants of Unknown Clinical Significance ALK p.([id number]) 8/2023: She followed up at [facility] where she received her first chemotherapy cycle, Taxol/pembrolizumab only due to carboplatin shortage. Taxol was given because her labs there showed a GFR<45, excluding her from being able to receive pemetrexed. She tolerated treatment reasonably well, without any neuropathy or cytopenias. She lost he hair after first chemotherapy cycle. 9/5/2023: PET at [facility]: Left upper lobe perihilar hypermetabolic mass consistent with biopsy-proven malignancy. Mildly FDG avid right thyroid nodule, correlate with thyroid ultrasound. 9/7/2023: Received cycle 1 paclitaxel/pembrolizumab at [facility] (Cr 1.2 there) 10/10/2023: MRIB: Since 7/11/2023, right posterior frontal enhancing lesion and vasogenic edema are completely resolved as a favorable response to therapy. No new enhancing lesion.  2/6/24: CT CAP:Since July 11, 2023, significantly decreased left upper lobe lesion. No suspicious lymphadenopathy. No new disease in the chest abdomen or pelvis. Stable incidental findings above. 2/23/24: MRI HEAD: No evidence of abnormal enhancement to suggest residual/recurrent metastatic disease. Chronic lacunar infarctions. Small right temporal occipital chronic infarction. Tiny left cerebellar chronic infarction. Microvascular disease.  6/14/2024: CT CAP: Interval increase in left upper lobe pulmonary lesion. Suggestion of increase in left hepatic lesion, likely metastatic. Suggestion of increased soft tissue density and enlargement of cervix, not well visualized on this exam. New fluid collection in the subcutaneous tissues of the left gluteal region may represent an evolving hematoma from patient's recent fall in May 2024.  MRIB: Peripherally enhancing lesions are identified as described above. These findings are likely compatible with metastasis (progression of patient's underlying disease process) given patient's history.  [de-identified] : Adenocarcinoma - STK11, PIK3CA, TP53, VUS in ALK, PDL1 negative [de-identified] : Medonc ([facility]): Dr.Rosa Jeffrey NeuroSx: Dr.D'Amico [facility]: Dr. Wernicke [FreeTextEntry1] : 9/7/2023: Taxol/pembrolizumab C1 given at [facility] (no carboplatin due to national shortage) 10/26/2023: C2 carboplatin/Taxol/pembrolizumab 11/16/23 C3 carboplatin/taxol/pembro (held due to hyperglycemia, elevated lfts and hyponatremia) 11/22/23 C3 carboplatin/taxol/pembro 12/13/23 C4 held due to hyperglycemia. 1/9/2024: C4 carboplatin/Taxol/pembrolizumab 3/12/2024 C1 pembro 4/2/2024. C2 pembro 4/23/2024. C3 pembro No Show due to hyperglyemia. Pt was seen in ED 5/1/2024. C3 pembro 6/6/2024: C4 pembro (delayed due to fall and rehab placement post hospitalization) 7/11/2024: C1 gemcitabine 7/23/2024: S/p SRS to BMs [de-identified] : Presents for f/u. Last visit she was sent to hospital due to hyperglycemia glc>700 as she has not taken her basal nightime insulin. Her sugar today is in 200 range. She is feeling ok. Taking her meds, including Eliquis.

## 2024-08-26 NOTE — HISTORY OF PRESENT ILLNESS
[FreeTextEntry1] :  MICHAEL READ is a 74 year old female with pmhx of T2D, metastatic lung cancer to brain (S/p RT, currently on Q3week chemo) who presents for T2D follow-up.  Patient of Dr. Esparza, last (initial) visit, 2/12/24 Last (initial) visit with myself, 7/2/2024  Interval change: Since last visit, chemotherapy stopped in May.  Recent MRI/CT with 2 new brain lesions and is planning to start radiation therapy for new lesions of brain. Continues MDI regimen, as below and endorses less missed prandial dosing, but repotrs to not dosing with lunch before her visit today, will correct. Content with MDI regimen Endorses fasting BG ok, but later in the day it rises with meals Trying to make diet modification, but endorses "A lot" of excursions.   Not interested in CGM for monitoring, ok with fingerstick BG monitoring   A1C - 9% (7/3/24) from 10.6% (4/5/24) from 9.4% (1/2024) from 8.5% (11/2023) Office Fingerstick -- 431 (~1H ago: shrimp and french fries, snapple, did not take Novolog with this for coverage)  Current medication: - Lantus 18 units daily - Novolog 8 units with meals Doses increase surrounding chemo cycles with dexamethasone administration to Lantus of 28 units daily and Novolog of 14 units before meals  Past medication: - Farxiga, d/c following UTI earlier in 2024 - Myron  MICHAEL reports they take their diabetes medication most of the time. Endorses some missed prandial dosing MICHAEL denies hypoglycemia symptoms or BG <70  Diabetes Self-Management: Monitoring BG ~2x daily Verbalizes sugar range as below -- fasting BG range: low to mid- 100s -- before bedtime BG range of low 200s  Diet-- Typically consumes 3 meals/daily, infrequent snacks Beverages: water, intermittent fruit juice (working to decrease) Typical meal times are: 8AM, 1-2PM, 6PM

## 2024-08-26 NOTE — ASSESSMENT
[FreeTextEntry1] : 73 yo F with CKD, RA, DM, HTN here for NSCLC with BMs and liver met (STK11 mutant, PIK3CA mutant, TP53 mutant, PD-L1 negative).  #NSCLC - BMs s/p SRS, liver met. Received C1 paclitaxel/pembrolizumab on 9/7/2023 at [facility]. This regimen was favored over carbo/pemetrexed/pembrolizumab due to CKD and baseline GFR less than 45. She continued carboplatin/Taxol/pembrolizumab due to CKD --Treatment was delayed and held several times because of uncontrolled hyperglycemia --patient very non-compliant with food restrictions --Post 4 cycles induction chemoIO showing partial response with CT CAP 2/6/2024 (90% reduction in lung mass) and MRIB 2/23/2024 (s/p SRS, n/e of residual BMs) --June 2024 she has progression of disease in the body and brain on maintenance pembrolizumab. Given her creatinine clearance of 41 she is not a candidate for targeted therapy unless her creatinine improves. --Treatment options, ideally she should get Alimta, however given her creatinine clearance I worry about significant cytopenias, also Alimta is contraindicated in patients with creatinine clearance of less than 45 therefore planned for single agent gemcitabine with therapy, given on day 1 and day 8 of each 21-day cycle (when hyperglycemia resolved). Discussed risks, benefits and treatment schedule. Risks include but are not limited to profound cytopenias, hair loss, rashes, constipation or diarrhea, abdominal pain. Informed consent signed. Educational materials provided. --Ordered CBC, CMP, TSH today --can proceed with gemcitabine today with aloxi only as premedication --plan to get D8 next week --patient is on intensive treatment with gemcitabine requiring monitoring for toxicity with CBC, CMP every D1 and D8 of each 21 day cycle  #R calf DVT Typically distal DVTs do not require AC, however when symptomatic AC is indication. She was started on Eliquis on her early August hospitalization when DVT was discovered. Her US from most recent hospitalization shows DVT resolution --c/w Eliquis for 3 months of therapy   #Brain metastases --referred to Drs. D'Amico and Wernicke for SRS to new lesions  #CKD  --stable  #Rheumatoid arthritis --Being managed by Dr. Johnston  RTC in 1 week Labs: CBC, CMP

## 2024-08-26 NOTE — PHYSICAL EXAM
[Normal] : affect appropriate [de-identified] : Appropriately conversant, but has difficulty recalling events yesterday (ie. taking insulin) or why she came to clinic at night [de-identified] : supple [de-identified] : Bilateral calf tenderness. 2+ pitting edema of RLE to knee. RLE tender and swollen, similar to last viist. [de-identified] : 5/5 strength in UE. 5/5 strength in RLE. 4+/5 strength in LLE. [de-identified] : No rashes. 0.5cm healing scabbed circular lesion to right shin, without erythema, warmth, or fluctuance. [de-identified] : AOx3. CN grossly intact. No focal neurologic deficits. Normal speech.

## 2024-08-26 NOTE — HISTORY OF PRESENT ILLNESS
[Disease: _____________________] : Disease: [unfilled] [de-identified] : Noemi Minor is a 74-year-old female with CKD, DM, RA, HTN who presents to the clinic for follow up of NSCLC with BMs (PDL1 negative, STK11, PIK3CA, TP53).  Onc Hx: Ex-smoker with history of CKD, baseline creatinine is around 2, diabetes, rheumatoid arthritis, HTN. She was experiencing 1 week of daily falls with lower extremity weakness without dizziness, was admitted to [facility]. MRI brain showed 0.9 cm right frontal lobe mass with surrounding vasogenic edema. CT chest abdomen pelvis revealed left upper lobe mass 2.6 x 4.1 cm in posterior aspect of left upper lobe abutting major fissure. The mass extends to adjacent left hilum and laterally to left lateral pleura. 1.5 cm left lobe of liver lesion. 1.2 cm pancreatic body cystic lesion. 7/14/2023 bronchoscopy-lingular biopsy poorly differentiated adenocarcinoma, positive TTF-1, TMB 17.3, PD-L1 negative. Tier II: Variants of Potential Clinical Significance STK11 p.([id number]) PIK3CA p.([id number]) TP53 p.([id number]) Tier III: Variants of Unknown Clinical Significance ALK p.([id number]) 8/2023: She followed up at [facility] [place] where she received her first chemotherapy cycle, Taxol/pembrolizumab only due to carboplatin shortage. Taxol was given because her labs there showed a GFR<45, excluding her from being able to receive pemetrexed. She tolerated treatment reasonably well, without any neuropathy or cytopenias. She lost he hair after first chemotherapy cycle. 9/5/2023: PET at [facility] [place]: Left upper lobe perihilar hypermetabolic mass consistent with biopsy-proven malignancy. Mildly FDG avid right thyroid nodule, correlate with thyroid ultrasound. 9/7/2023: Received cycle 1 paclitaxel/pembrolizumab at [facility] [place] (Cr 1.2 there) 10/10/2023: MRIB: Since 7/11/2023, right posterior frontal enhancing lesion and vasogenic edema are completely resolved as a favorable response to therapy. No new enhancing lesion.  2/6/24: CT CAP:Since July 11, 2023, significantly decreased left upper lobe lesion. No suspicious lymphadenopathy. No new disease in the chest abdomen or pelvis. Stable incidental findings above. 2/23/24: MRI HEAD: No evidence of abnormal enhancement to suggest residual/recurrent metastatic disease. Chronic lacunar infarctions. Small right temporal occipital chronic infarction. Tiny left cerebellar chronic infarction. Microvascular disease.  6/14/2024: CT CAP: Interval increase in left upper lobe pulmonary lesion. Suggestion of increase in left hepatic lesion, likely metastatic. Suggestion of increased soft tissue density and enlargement of cervix, not well visualized on this exam. New fluid collection in the subcutaneous tissues of the left gluteal region may represent an evolving hematoma from patient's recent fall in May 2024.  MRIB: Peripherally enhancing lesions are identified as described above. These findings are likely compatible with metastasis (progression of patient's underlying disease process) given patient's history.  [de-identified] : Adenocarcinoma - STK11, PIK3CA, TP53, VUS in ALK, PDL1 negative [de-identified] : Medonc ([facility] [place]): Dr.Rosa Jeffrey NeuroSx: Dr.D'Amico [facility]: Dr. Wernicke [FreeTextEntry1] : 9/7/2023: Taxol/pembrolizumab C1 given at [facility] [place] (no carboplatin due to national shortage) 10/26/2023: C2 carboplatin/Taxol/pembrolizumab 11/16/23 C3 carboplatin/taxol/pembro (held due to hyperglycemia, elevated lfts and hyponatremia) 11/22/23 C3 carboplatin/taxol/pembro 12/13/23 C4 held due to hyperglycemia. 1/9/2024: C4 carboplatin/Taxol/pembrolizumab 3/12/2024 C1 pembro 4/2/2024. C2 pembro 4/23/2024. C3 pembro No Show due to hyperglyemia. Pt was seen in ED 5/1/2024. C3 pembro 6/6/2024: C4 pembro (delayed due to fall and rehab placement post hospitalization) 7/11/2024: C1 gemcitabine 7/23/2024: S/p SRS to BMs [de-identified] : Presents for f/u. Last visit she was sent to hospital due to hyperglycemia glc>700 as she has not taken her basal nightime insulin. Her sugar today is in 200 range. She is feeling ok. Taking her meds, including Eliquis.

## 2024-08-26 NOTE — HISTORY OF PRESENT ILLNESS
[Disease: _____________________] : Disease: [unfilled] [de-identified] : Noemi Minor is a 74-year-old female with CKD, DM, RA, HTN who presents to the clinic for follow up of NSCLC with BMs (PDL1 negative, STK11, PIK3CA, TP53).  Onc Hx: Ex-smoker with history of CKD, baseline creatinine is around 2, diabetes, rheumatoid arthritis, HTN. She was experiencing 1 week of daily falls with lower extremity weakness without dizziness, was admitted to [facility]. MRI brain showed 0.9 cm right frontal lobe mass with surrounding vasogenic edema. CT chest abdomen pelvis revealed left upper lobe mass 2.6 x 4.1 cm in posterior aspect of left upper lobe abutting major fissure. The mass extends to adjacent left hilum and laterally to left lateral pleura. 1.5 cm left lobe of liver lesion. 1.2 cm pancreatic body cystic lesion. 7/14/2023 bronchoscopy-lingular biopsy poorly differentiated adenocarcinoma, positive TTF-1, TMB 17.3, PD-L1 negative. Tier II: Variants of Potential Clinical Significance STK11 p.([id number]) PIK3CA p.([id number]) TP53 p.([id number]) Tier III: Variants of Unknown Clinical Significance ALK p.([id number]) 8/2023: [place] [facility] [place] chemotherapy cycle, Taxol/pembrolizumab only due to carboplatin shortage. Taxol was given because her labs there showed a GFR<45, excluding her from being able to receive pemetrexed. She tolerated treatment reasonably well, without any neuropathy or cytopenias. She lost he hair after first chemotherapy cycle. 9/5/2023: [place] [facility] [place] upper lobe perihilar hypermetabolic mass consistent with biopsy-proven malignancy. Mildly FDG avid right thyroid nodule, correlate with thyroid ultrasound. 9/7/2023: [place] 1 [place] [facility] [place] 10/10/2023: MRIB: Since 7/11/2023, right posterior frontal enhancing lesion and vasogenic edema are completely resolved as a favorable response to therapy. No new enhancing lesion.  2/6/24: CT CAP:Since July 11, 2023, significantly decreased left upper lobe lesion. No suspicious lymphadenopathy. No new disease in the chest abdomen or pelvis. Stable incidental findings above. 2/23/24: MRI HEAD: No evidence of abnormal enhancement to suggest residual/recurrent metastatic disease. Chronic lacunar infarctions. Small right temporal occipital chronic infarction. Tiny left cerebellar chronic infarction. Microvascular disease.  6/14/2024: CT CAP: Interval increase in left upper lobe pulmonary lesion. Suggestion of increase in left hepatic lesion, likely metastatic. Suggestion of increased soft tissue density and enlargement of cervix, not well visualized on this exam. New fluid collection in the subcutaneous tissues of the left gluteal region may represent an evolving hematoma from patient's recent fall in May 2024.  MRIB: Peripherally enhancing lesions are identified as described above. These findings are likely compatible with metastasis (progression of patient's underlying disease process) given patient's history.  [de-identified] : Adenocarcinoma - STK11, PIK3CA, TP53, VUS in ALK, PDL1 negative [de-identified] : Medonc ([facility] [place]): Dr.Rosa Jeffrey NeuroSx: Dr.D'Amico [facility]: Dr. Wernicke [FreeTextEntry1] : 9/7/2023: Taxol/pembrolizumab C1 given at [facility] [place] (no carboplatin due to national shortage) 10/26/2023: C2 carboplatin/Taxol/pembrolizumab 11/16/23 C3 carboplatin/taxol/pembro (held due to hyperglycemia, elevated lfts and hyponatremia) 11/22/23 C3 carboplatin/taxol/pembro 12/13/23 C4 held due to hyperglycemia. 1/9/2024: C4 carboplatin/Taxol/pembrolizumab 3/12/2024 C1 pembro 4/2/2024. C2 pembro 4/23/2024. C3 pembro No Show due to hyperglyemia. Pt was seen in ED 5/1/2024. C3 pembro 6/6/2024: C4 pembro (delayed due to fall and rehab placement post hospitalization) 7/11/2024: C1 gemcitabine 7/23/2024: S/p SRS to BMs [de-identified] : Presents for f/u. Last visit she was sent to hospital due to hyperglycemia glc>700 as she has not taken her basal nightime insulin. Her sugar today is in 200 range. She is feeling ok. Taking her meds, including Eliquis.

## 2024-08-26 NOTE — ASSESSMENT
[FreeTextEntry1] : 75 yo F with CKD, RA, DM, HTN here for NSCLC with BMs and liver met (STK11 mutant, PIK3CA mutant, TP53 mutant, PD-L1 negative).  #NSCLC - BMs s/p SRS, liver met. Received C1 paclitaxel/pembrolizumab on 9/7/2023 at [facility] [place]. This regimen was favored over carbo/pemetrexed/pembrolizumab due to CKD and baseline GFR less than 45. She continued carboplatin/Taxol/pembrolizumab due to CKD --Treatment was delayed and held several times because of uncontrolled hyperglycemia --patient very non-compliant with food restrictions --Post 4 cycles induction chemoIO showing partial response with CT CAP 2/6/2024 (90% reduction in lung mass) and MRIB 2/23/2024 (s/p SRS, n/e of residual BMs) --June 2024 she has progression of disease in the body and brain on maintenance pembrolizumab. Given her creatinine clearance of 41 she is not a candidate for targeted therapy unless her creatinine improves. --Treatment options, ideally she should get Alimta, however given her creatinine clearance I worry about significant cytopenias, also Alimta is contraindicated in patients with creatinine clearance of less than 45 therefore planned for single agent gemcitabine with therapy, given on day 1 and day 8 of each 21-day cycle (when hyperglycemia resolved). Discussed risks, benefits and treatment schedule. Risks include but are not limited to profound cytopenias, hair loss, rashes, constipation or diarrhea, abdominal pain. Informed consent signed. Educational materials provided. --Ordered CBC, CMP, TSH today --can proceed with gemcitabine today with aloxi only as premedication --plan to get D8 next week --patient is on intensive treatment with gemcitabine requiring monitoring for toxicity with CBC, CMP every D1 and D8 of each 21 day cycle  #R calf DVT Typically distal DVTs do not require AC, however when symptomatic AC is indication. She was started on Eliquis on her early August hospitalization when DVT was discovered. Her US from most recent hospitalization shows DVT resolution --c/w Eliquis for 3 months of therapy   #Brain metastases --referred to Drs. D'Amico and Wernicke for SRS to new lesions  #CKD  --stable  #Rheumatoid arthritis --Being managed by Dr. Johnston  RTC in 1 week Labs: CBC, CMP

## 2024-08-26 NOTE — ASSESSMENT
[FreeTextEntry1] : 73 yo F with CKD, RA, DM, HTN here for NSCLC with BMs and liver met (STK11 mutant, PIK3CA mutant, TP53 mutant, PD-L1 negative).  #NSCLC - BMs s/p SRS, liver met. [place]1 pac[place] [facility] [place] This regimen was favored over carbo/pemetrexed/pembrolizumab due to CKD and baseline GFR less than 45. She continued carboplatin/Taxol/pembrolizumab due to CKD --Treatment was delayed and held several times because of uncontrolled hyperglycemia --patient very non-compliant with food restrictions --Post 4 cycles induction chemoIO showing partial response with CT CAP 2/6/2024 (90% reduction in lung mass) and MRIB 2/23/2024 (s/p SRS, n/e of residual BMs) --June 2024 she has progression of disease in the body and brain on maintenance pembrolizumab. Given her creatinine clearance of 41 she is not a candidate for targeted therapy unless her creatinine improves. --Treatment options, ideally she should get Alimta, however given her creatinine clearance I worry about significant cytopenias, also Alimta is contraindicated in patients with creatinine clearance of less than 45 therefore planned for single agent gemcitabine with therapy, given on day 1 and day 8 of each 21-day cycle (when hyperglycemia resolved). Discussed risks, benefits and treatment schedule. Risks include but are not limited to profound cytopenias, hair loss, rashes, constipation or diarrhea, abdominal pain. Informed consent signed. Educational materials provided. --Ordered CBC, CMP, TSH today --can proceed with gemcitabine today with aloxi only as premedication --plan to get D8 next week --patient is on intensive treatment with gemcitabine requiring monitoring for toxicity with CBC, CMP every D1 and D8 of each 21 day cycle  #R calf DVT Typically distal DVTs do not require AC, however when symptomatic AC is indication. She was started on Eliquis on her early August hospitalization when DVT was discovered. Her US from most recent hospitalization shows DVT resolution --c/w Eliquis for 3 months of therapy   #Brain metastases --referred to Drs. D'Amico and Wernicke for SRS to new lesions  #CKD  --stable  #Rheumatoid arthritis --Being managed by Dr. Johnston  RTC in 1 week Labs: CBC, CMP

## 2024-08-26 NOTE — PHYSICAL EXAM
[Normal] : affect appropriate [de-identified] : Appropriately conversant, but has difficulty recalling events yesterday (ie. taking insulin) or why she came to clinic at night [de-identified] : supple [de-identified] : Bilateral calf tenderness. 2+ pitting edema of RLE to knee. RLE tender and swollen, similar to last viist. [de-identified] : 5/5 strength in UE. 5/5 strength in RLE. 4+/5 strength in LLE. [de-identified] : No rashes. 0.5cm healing scabbed circular lesion to right shin, without erythema, warmth, or fluctuance. [de-identified] : AOx3. CN grossly intact. No focal neurologic deficits. Normal speech.

## 2024-08-26 NOTE — PHYSICAL EXAM
[Normal] : affect appropriate [de-identified] : Appropriately conversant, but has difficulty recalling events yesterday (ie. taking insulin) or why she came to clinic at night [de-identified] : supple [de-identified] : Bilateral calf tenderness. 2+ pitting edema of RLE to knee. RLE tender and swollen, similar to last viist. [de-identified] : 5/5 strength in UE. 5/5 strength in RLE. 4+/5 strength in LLE. [de-identified] : No rashes. 0.5cm healing scabbed circular lesion to right shin, without erythema, warmth, or fluctuance. [de-identified] : AOx3. CN grossly intact. No focal neurologic deficits. Normal speech.

## 2024-08-26 NOTE — ASSESSMENT
[FreeTextEntry1] : 1. T2D A1C goal <8.5% A1C - 9% (7/3/24) from 10.6% (4/5/24) from 9.4% (1/2024) from 8.5% (11/2023) Current regimen: Lantus 18 units daily, Novolog 8 units with meals  Glucometer readings at home reveal fasting BG at or close to goal with prandial excursions Glucometer reading performed in office today is above goal postprandial as consumed high carb meal without prandial insulin coverage.  Noemi endorses improvement of sugars when consistent and when eating better.  She continues to experience prandial excursions and denies hypos.  She is open to increasing Novolog regimen to further optimize glycemic control and will work to decrease juice/sugary beverages. -- continue Lantus 18 units daily -- increase Novolog to 9 units before meals and if 2H postprandiall she remains >225, to increase to 10 units -- try to limit fruit juice consumption to 4oz and not multiple times daily  FU in 2 months with myself and next available with Dr Vilma Mann, MS, [facility], [facility] 07/03/2024

## 2024-08-27 RX ORDER — PALONOSETRON HYDROCHLORIDE 0.25 MG/5ML
0.25 INJECTION INTRAVENOUS ONCE
Refills: 0 | Status: COMPLETED | OUTPATIENT
Start: 2024-08-29 | End: 2024-08-29

## 2024-08-27 RX ORDER — SODIUM CHLORIDE 9 MG/ML
10 INJECTION INTRAMUSCULAR; INTRAVENOUS; SUBCUTANEOUS ONCE
Refills: 0 | Status: COMPLETED | OUTPATIENT
Start: 2024-08-29 | End: 2024-08-29

## 2024-08-27 RX ORDER — LIDOCAINE/BENZALKONIUM/ALCOHOL
1 SOLUTION, NON-ORAL TOPICAL ONCE
Refills: 0 | Status: COMPLETED | OUTPATIENT
Start: 2024-08-29 | End: 2024-08-29

## 2024-08-27 RX ORDER — GEMCITABINE 10 MG/ML
1700 INJECTION, SOLUTION INTRAVENOUS ONCE
Refills: 0 | Status: COMPLETED | OUTPATIENT
Start: 2024-08-29 | End: 2024-08-29

## 2024-08-29 ENCOUNTER — APPOINTMENT (OUTPATIENT)
Dept: HEMATOLOGY ONCOLOGY | Facility: CLINIC | Age: 74
End: 2024-08-29
Payer: MEDICARE

## 2024-08-29 ENCOUNTER — OUTPATIENT (OUTPATIENT)
Dept: OUTPATIENT SERVICES | Facility: HOSPITAL | Age: 74
LOS: 1 days | End: 2024-08-29
Payer: MEDICARE

## 2024-08-29 ENCOUNTER — APPOINTMENT (OUTPATIENT)
Dept: INFUSION THERAPY | Facility: CLINIC | Age: 74
End: 2024-08-29

## 2024-08-29 VITALS
OXYGEN SATURATION: 96 % | HEART RATE: 71 BPM | SYSTOLIC BLOOD PRESSURE: 135 MMHG | DIASTOLIC BLOOD PRESSURE: 73 MMHG | TEMPERATURE: 98 F | RESPIRATION RATE: 18 BRPM

## 2024-08-29 VITALS
RESPIRATION RATE: 18 BRPM | HEIGHT: 63 IN | SYSTOLIC BLOOD PRESSURE: 148 MMHG | HEART RATE: 78 BPM | TEMPERATURE: 98 F | OXYGEN SATURATION: 98 % | DIASTOLIC BLOOD PRESSURE: 77 MMHG | WEIGHT: 138.89 LBS

## 2024-08-29 VITALS
TEMPERATURE: 97.8 F | RESPIRATION RATE: 18 BRPM | BODY MASS INDEX: 24.63 KG/M2 | HEIGHT: 63 IN | SYSTOLIC BLOOD PRESSURE: 148 MMHG | WEIGHT: 139 LBS | OXYGEN SATURATION: 97 % | HEART RATE: 78 BPM | DIASTOLIC BLOOD PRESSURE: 77 MMHG

## 2024-08-29 DIAGNOSIS — I82.409 ACUTE EMBOLISM AND THROMBOSIS OF UNSPECIFIED DEEP VEINS OF UNSPECIFIED LOWER EXTREMITY: ICD-10-CM

## 2024-08-29 DIAGNOSIS — C34.90 MALIGNANT NEOPLASM OF UNSPECIFIED PART OF UNSPECIFIED BRONCHUS OR LUNG: ICD-10-CM

## 2024-08-29 DIAGNOSIS — N18.4 CHRONIC KIDNEY DISEASE, STAGE 4 (SEVERE): ICD-10-CM

## 2024-08-29 DIAGNOSIS — C79.31 MALIGNANT NEOPLASM OF UNSPECIFIED PART OF UNSPECIFIED BRONCHUS OR LUNG: ICD-10-CM

## 2024-08-29 DIAGNOSIS — Z96.641 PRESENCE OF RIGHT ARTIFICIAL HIP JOINT: Chronic | ICD-10-CM

## 2024-08-29 LAB
ALBUMIN SERPL ELPH-MCNC: 3 G/DL
ALP BLD-CCNC: 56 U/L
ALT SERPL-CCNC: 12 U/L
ANION GAP SERPL CALC-SCNC: 1 MMOL/L
AST SERPL-CCNC: 20 U/L
BILIRUB SERPL-MCNC: 0.5 MG/DL
BUN SERPL-MCNC: 33 MG/DL
CALCIUM SERPL-MCNC: 9.6 MG/DL
CHLORIDE SERPL-SCNC: 106 MMOL/L
CO2 SERPL-SCNC: 30 MMOL/L
CREAT SERPL-MCNC: 1.6 MG/DL
EGFR: 33 ML/MIN/1.73M2
GLUCOSE SERPL-MCNC: 291 MG/DL
HCT VFR BLD CALC: 29.8 %
HGB BLD-MCNC: 9.5 G/DL
LYMPHOCYTES # BLD AUTO: 0.9 K/UL
LYMPHOCYTES NFR BLD AUTO: 15.8 %
MAN DIFF?: NO
MCHC RBC-ENTMCNC: 27.5 PG
MCHC RBC-ENTMCNC: 31.9 GM/DL
MCV RBC AUTO: 86.4 FL
NEUTROPHILS # BLD AUTO: 4.5 K/UL
NEUTROPHILS NFR BLD AUTO: 73.6 %
PLATELET # BLD AUTO: 180 K/UL
POTASSIUM SERPL-SCNC: 4.5 MMOL/L
PROT SERPL-MCNC: 6.8 G/DL
RBC # BLD: 3.45 M/UL
RBC # FLD: 14.6 %
SODIUM SERPL-SCNC: 137 MMOL/L
WBC # FLD AUTO: 6 K/UL

## 2024-08-29 PROCEDURE — 96413 CHEMO IV INFUSION 1 HR: CPT

## 2024-08-29 PROCEDURE — G2211 COMPLEX E/M VISIT ADD ON: CPT

## 2024-08-29 PROCEDURE — 99215 OFFICE O/P EST HI 40 MIN: CPT

## 2024-08-29 PROCEDURE — 96375 TX/PRO/DX INJ NEW DRUG ADDON: CPT

## 2024-08-29 PROCEDURE — 96372 THER/PROPH/DIAG INJ SC/IM: CPT

## 2024-08-29 RX ADMIN — SODIUM CHLORIDE 10 MILLILITER(S): 9 INJECTION INTRAMUSCULAR; INTRAVENOUS; SUBCUTANEOUS at 16:30

## 2024-08-29 RX ADMIN — PALONOSETRON HYDROCHLORIDE 0.25 MILLIGRAM(S): 0.25 INJECTION INTRAVENOUS at 15:24

## 2024-08-29 RX ADMIN — GEMCITABINE 1700 MILLIGRAM(S): 10 INJECTION, SOLUTION INTRAVENOUS at 15:58

## 2024-08-29 RX ADMIN — PEGFILGRASTIM-CBQV 6 MILLIGRAM(S): 6 INJECTION, SOLUTION SUBCUTANEOUS at 16:35

## 2024-08-29 RX ADMIN — GEMCITABINE 1700 MILLIGRAM(S): 10 INJECTION, SOLUTION INTRAVENOUS at 16:28

## 2024-08-30 ENCOUNTER — APPOINTMENT (OUTPATIENT)
Dept: INTERNAL MEDICINE | Facility: CLINIC | Age: 74
End: 2024-08-30

## 2024-08-30 LAB — TSH SERPL-ACNC: 0.72 UIU/ML

## 2024-09-03 ENCOUNTER — APPOINTMENT (OUTPATIENT)
Dept: PHYSICAL MEDICINE AND REHAB | Facility: CLINIC | Age: 74
End: 2024-09-03

## 2024-09-05 ENCOUNTER — NON-APPOINTMENT (OUTPATIENT)
Age: 74
End: 2024-09-05

## 2024-09-05 ENCOUNTER — APPOINTMENT (OUTPATIENT)
Dept: HEART AND VASCULAR | Facility: CLINIC | Age: 74
End: 2024-09-05
Payer: MEDICARE

## 2024-09-05 VITALS
TEMPERATURE: 97.3 F | SYSTOLIC BLOOD PRESSURE: 127 MMHG | HEIGHT: 63 IN | HEART RATE: 74 BPM | BODY MASS INDEX: 24.27 KG/M2 | WEIGHT: 137 LBS | OXYGEN SATURATION: 99 % | DIASTOLIC BLOOD PRESSURE: 68 MMHG

## 2024-09-05 DIAGNOSIS — I10 ESSENTIAL (PRIMARY) HYPERTENSION: ICD-10-CM

## 2024-09-05 DIAGNOSIS — R01.1 CARDIAC MURMUR, UNSPECIFIED: ICD-10-CM

## 2024-09-05 PROCEDURE — 93000 ELECTROCARDIOGRAM COMPLETE: CPT

## 2024-09-05 PROCEDURE — 99213 OFFICE O/P EST LOW 20 MIN: CPT | Mod: 25

## 2024-09-05 RX ORDER — INSULIN LISPRO 100 U/ML
100 INJECTION, SOLUTION SUBCUTANEOUS
Refills: 0 | Status: ACTIVE | COMMUNITY
Start: 2024-08-17

## 2024-09-05 NOTE — REASON FOR VISIT
[FreeTextEntry1] : Here for follow up of blood pressure. Has been hospitalized multiple times for falls secondary to unsteady gait probably related to brain mets. Has had antihypertensive medications simplifed to monotherapy with nifedipine. BP at home in 130s/70s. No shortness of breath, chest pain or palpitations. No edema.

## 2024-09-05 NOTE — PHYSICAL EXAM
[No Acute Distress] : no acute distress [Normal Venous Pressure] : normal venous pressure [Normal S1, S2] : normal S1, S2 [Clear Lung Fields] : clear lung fields [No Edema] : no edema [de-identified] : 3/6 HSM

## 2024-09-09 ENCOUNTER — APPOINTMENT (OUTPATIENT)
Dept: INTERNAL MEDICINE | Facility: CLINIC | Age: 74
End: 2024-09-09
Payer: MEDICARE

## 2024-09-09 VITALS
SYSTOLIC BLOOD PRESSURE: 149 MMHG | TEMPERATURE: 97.1 F | BODY MASS INDEX: 24.98 KG/M2 | DIASTOLIC BLOOD PRESSURE: 75 MMHG | HEART RATE: 71 BPM | WEIGHT: 141 LBS | HEIGHT: 63 IN | OXYGEN SATURATION: 95 %

## 2024-09-09 DIAGNOSIS — E11.65 TYPE 2 DIABETES MELLITUS WITH HYPERGLYCEMIA: ICD-10-CM

## 2024-09-09 DIAGNOSIS — Z79.4 TYPE 2 DIABETES MELLITUS WITH HYPERGLYCEMIA: ICD-10-CM

## 2024-09-09 PROCEDURE — 99214 OFFICE O/P EST MOD 30 MIN: CPT

## 2024-09-09 NOTE — ASSESSMENT
[FreeTextEntry1] : dm  a1c ordered in office; patient wants to go to lab to have it done instead. requisition given to patient continue with insulin regimen as per endo  unsure if she is actually compliant with insulin  emphasized a carb conscious diet & decreasing nic juice consumption   called endo office to confirm appts, left a voicemail with callback number   falls  instructed to stop eliquis, as advised prior by heme & pcp  pt ordered

## 2024-09-09 NOTE — HISTORY OF PRESENT ILLNESS
[FreeTextEntry1] : follow up post hospitalization for Fall on 8/27  [de-identified] : Following up post falls  States due to her cancer she has been having many falls  Last fall in August 26, stayed in [facility] ED for observation & FS in 400s  Denies any recent falls  Using a rolling walker  Would like to have PT done  HHA 2 hours a day   Still taking eliquis despite being told by Hematologist & Dr. Douglass that she should stop   Reports normal appetite  Ate a turkey sandwich prior to visit - did not administer admelog  Lantus 19 units at night & Admelog 10 units with meals  Checks FS TID - normally in 200s  Last saw endo in July - A1C 9% States that she will have increased urinary frequency when sugar is high Denies urinary frequency  She was told she has an Endo appt this Thursday, the 12th at 1pm with Vilma  However, looks like she has hematology appt this Thursday at 2 & her next endo appt is on 11/12 at 1pm

## 2024-09-09 NOTE — PHYSICAL EXAM
[Normal Sclera/Conjunctiva] : normal sclera/conjunctiva [EOMI] : extraocular movements intact [Normal Outer Ear/Nose] : the outer ears and nose were normal in appearance [Supple] : supple [Normal Rate] : normal rate  [Regular Rhythm] : with a regular rhythm [Normal S1, S2] : normal S1 and S2 [No Edema] : there was no peripheral edema [Soft] : abdomen soft [Non Tender] : non-tender [Non-distended] : non-distended [Normal Bowel Sounds] : normal bowel sounds [Grossly Normal Strength/Tone] : grossly normal strength/tone [Coordination Grossly Intact] : coordination grossly intact [Normal] : affect was normal and insight and judgment were intact

## 2024-09-12 ENCOUNTER — OUTPATIENT (OUTPATIENT)
Dept: OUTPATIENT SERVICES | Facility: HOSPITAL | Age: 74
LOS: 1 days | End: 2024-09-12
Payer: MEDICARE

## 2024-09-12 ENCOUNTER — APPOINTMENT (OUTPATIENT)
Dept: INFUSION THERAPY | Facility: CLINIC | Age: 74
End: 2024-09-12

## 2024-09-12 ENCOUNTER — APPOINTMENT (OUTPATIENT)
Dept: HEMATOLOGY ONCOLOGY | Facility: CLINIC | Age: 74
End: 2024-09-12

## 2024-09-12 VITALS
BODY MASS INDEX: 26.05 KG/M2 | DIASTOLIC BLOOD PRESSURE: 66 MMHG | HEIGHT: 63 IN | WEIGHT: 147 LBS | HEART RATE: 75 BPM | SYSTOLIC BLOOD PRESSURE: 106 MMHG | RESPIRATION RATE: 18 BRPM | TEMPERATURE: 97.8 F | OXYGEN SATURATION: 95 %

## 2024-09-12 VITALS
OXYGEN SATURATION: 99 % | RESPIRATION RATE: 16 BRPM | SYSTOLIC BLOOD PRESSURE: 122 MMHG | TEMPERATURE: 98 F | DIASTOLIC BLOOD PRESSURE: 68 MMHG | HEART RATE: 74 BPM

## 2024-09-12 VITALS
HEIGHT: 63 IN | DIASTOLIC BLOOD PRESSURE: 66 MMHG | SYSTOLIC BLOOD PRESSURE: 108 MMHG | WEIGHT: 147.05 LBS | TEMPERATURE: 98 F | HEART RATE: 76 BPM | OXYGEN SATURATION: 98 % | RESPIRATION RATE: 17 BRPM

## 2024-09-12 DIAGNOSIS — C34.90 MALIGNANT NEOPLASM OF UNSPECIFIED PART OF UNSPECIFIED BRONCHUS OR LUNG: ICD-10-CM

## 2024-09-12 DIAGNOSIS — Z96.641 PRESENCE OF RIGHT ARTIFICIAL HIP JOINT: Chronic | ICD-10-CM

## 2024-09-12 LAB
ALBUMIN SERPL ELPH-MCNC: 2.8 G/DL
ALP BLD-CCNC: 106 U/L
ALT SERPL-CCNC: 15 U/L
ANION GAP SERPL CALC-SCNC: 2 MMOL/L
AST SERPL-CCNC: 23 U/L
BILIRUB SERPL-MCNC: 0.5 MG/DL
BUN SERPL-MCNC: 27 MG/DL
CALCIUM SERPL-MCNC: 9.4 MG/DL
CHLORIDE SERPL-SCNC: 110 MMOL/L
CO2 SERPL-SCNC: 27 MMOL/L
CREAT SERPL-MCNC: 1.6 MG/DL
EGFR: 33 ML/MIN/1.73M2
ESTIMATED AVERAGE GLUCOSE: 249 MG/DL
GLUCOSE SERPL-MCNC: 89 MG/DL
HBA1C MFR BLD HPLC: 10.3 %
HCT VFR BLD CALC: 26.7 %
HGB BLD-MCNC: 8.3 G/DL
LYMPHOCYTES # BLD AUTO: 1.8 K/UL
LYMPHOCYTES NFR BLD AUTO: 10 %
MAN DIFF?: NO
MCHC RBC-ENTMCNC: 27.9 PG
MCHC RBC-ENTMCNC: 31.1 GM/DL
MCV RBC AUTO: 89.6 FL
NEUTROPHILS # BLD AUTO: 13.9 K/UL
NEUTROPHILS NFR BLD AUTO: 75.1 %
PLATELET # BLD AUTO: 392 K/UL
POTASSIUM SERPL-SCNC: 4.3 MMOL/L
PROT SERPL-MCNC: 6.3 G/DL
RBC # BLD: 2.98 M/UL
RBC # FLD: 19.2 %
SODIUM SERPL-SCNC: 139 MMOL/L
WBC # FLD AUTO: 18.4 K/UL

## 2024-09-12 PROCEDURE — G2211 COMPLEX E/M VISIT ADD ON: CPT | Mod: NC

## 2024-09-12 PROCEDURE — 96413 CHEMO IV INFUSION 1 HR: CPT

## 2024-09-12 PROCEDURE — 99214 OFFICE O/P EST MOD 30 MIN: CPT

## 2024-09-12 PROCEDURE — 96375 TX/PRO/DX INJ NEW DRUG ADDON: CPT

## 2024-09-12 RX ORDER — SODIUM CHLORIDE 9 MG/ML
1000 INJECTION INTRAMUSCULAR; INTRAVENOUS; SUBCUTANEOUS ONCE
Refills: 0 | Status: COMPLETED | OUTPATIENT
Start: 2024-09-12 | End: 2024-09-12

## 2024-09-12 RX ORDER — SODIUM CHLORIDE 9 MG/ML
10 INJECTION INTRAMUSCULAR; INTRAVENOUS; SUBCUTANEOUS ONCE
Refills: 0 | Status: COMPLETED | OUTPATIENT
Start: 2024-09-12 | End: 2024-09-12

## 2024-09-12 RX ORDER — PALONOSETRON HYDROCHLORIDE 0.25 MG/5ML
0.25 INJECTION INTRAVENOUS ONCE
Refills: 0 | Status: COMPLETED | OUTPATIENT
Start: 2024-09-12 | End: 2024-09-12

## 2024-09-12 RX ORDER — LIDOCAINE/BENZALKONIUM/ALCOHOL
1 SOLUTION, NON-ORAL TOPICAL ONCE
Refills: 0 | Status: COMPLETED | OUTPATIENT
Start: 2024-09-12 | End: 2024-09-12

## 2024-09-12 RX ORDER — GEMCITABINE 10 MG/ML
1700 INJECTION, SOLUTION INTRAVENOUS ONCE
Refills: 0 | Status: COMPLETED | OUTPATIENT
Start: 2024-09-12 | End: 2024-09-12

## 2024-09-12 RX ADMIN — PALONOSETRON HYDROCHLORIDE 0.25 MILLIGRAM(S): 0.25 INJECTION INTRAVENOUS at 16:46

## 2024-09-12 RX ADMIN — SODIUM CHLORIDE 1000 MILLILITER(S): 9 INJECTION INTRAMUSCULAR; INTRAVENOUS; SUBCUTANEOUS at 17:30

## 2024-09-12 RX ADMIN — GEMCITABINE 1700 MILLIGRAM(S): 10 INJECTION, SOLUTION INTRAVENOUS at 17:45

## 2024-09-12 RX ADMIN — GEMCITABINE 1700 MILLIGRAM(S): 10 INJECTION, SOLUTION INTRAVENOUS at 17:15

## 2024-09-12 RX ADMIN — SODIUM CHLORIDE 10 MILLILITER(S): 9 INJECTION INTRAMUSCULAR; INTRAVENOUS; SUBCUTANEOUS at 17:47

## 2024-09-12 RX ADMIN — SODIUM CHLORIDE 1000 MILLILITER(S): 9 INJECTION INTRAMUSCULAR; INTRAVENOUS; SUBCUTANEOUS at 16:30

## 2024-09-13 LAB — TSH SERPL-ACNC: 1.18 UIU/ML

## 2024-09-18 RX ORDER — SODIUM CHLORIDE 9 MG/ML
10 INJECTION INTRAMUSCULAR; INTRAVENOUS; SUBCUTANEOUS ONCE
Refills: 0 | Status: COMPLETED | OUTPATIENT
Start: 2024-09-19 | End: 2024-09-19

## 2024-09-18 RX ORDER — PEGFILGRASTIM-CBQV 6 MG/.6ML
6 INJECTION, SOLUTION SUBCUTANEOUS ONCE
Refills: 0 | Status: COMPLETED | OUTPATIENT
Start: 2024-09-19 | End: 2024-09-19

## 2024-09-18 RX ORDER — GEMCITABINE 10 MG/ML
1700 INJECTION, SOLUTION INTRAVENOUS ONCE
Refills: 0 | Status: COMPLETED | OUTPATIENT
Start: 2024-09-19 | End: 2024-09-19

## 2024-09-18 RX ORDER — LIDOCAINE/BENZALKONIUM/ALCOHOL
1 SOLUTION, NON-ORAL TOPICAL ONCE
Refills: 0 | Status: COMPLETED | OUTPATIENT
Start: 2024-09-19 | End: 2024-09-19

## 2024-09-18 NOTE — HISTORY OF PRESENT ILLNESS
[Disease: _____________________] : Disease: [unfilled] [de-identified] : Noemi Minor is a 74-year-old female with CKD, DM, RA, HTN who presents to the clinic for follow up of NSCLC with BMs (PDL1 negative, STK11, PIK3CA, TP53).  Onc Hx: Ex-smoker with history of CKD, baseline creatinine is around 2, diabetes, rheumatoid arthritis, HTN. She was experiencing 1 week of daily falls with lower extremity weakness without dizziness, was admitted to [facility]. MRI brain showed 0.9 cm right frontal lobe mass with surrounding vasogenic edema. CT chest abdomen pelvis revealed left upper lobe mass 2.6 x 4.1 cm in posterior aspect of left upper lobe abutting major fissure. The mass extends to adjacent left hilum and laterally to left lateral pleura. 1.5 cm left lobe of liver lesion. 1.2 cm pancreatic body cystic lesion. 7/14/2023 bronchoscopy-lingular biopsy poorly differentiated adenocarcinoma, positive TTF-1, TMB 17.3, PD-L1 negative. Tier II: Variants of Potential Clinical Significance STK11 p.([id number]) PIK3CA p.([id number]) TP53 p.([id number]) Tier III: Variants of Unknown Clinical Significance ALK p.([id number]) 8/2023: [place] [facility] [place] her first chemotherapy cycle, Taxol/pembrolizumab only due to carboplatin shortage. Taxol was given because her labs there showed a GFR<45, excluding her from being able to receive pemetrexed. She tolerated treatment reasonably well, without any neuropathy or cytopenias. She lost he hair after first chemotherapy cycle. 9/5/2023: [place] [facility] [place] Left upper lobe perihilar hypermetabolic mass consistent with biopsy-proven malignancy. Mildly FDG avid right thyroid nodule, correlate with thyroid ultrasound. 9/7/2023: Re[place] cycle 1 [place] [facility] [place] 10/10/2023: MRIB: Since 7/11/2023, right posterior frontal enhancing lesion and vasogenic edema are completely resolved as a favorable response to therapy. No new enhancing lesion.  2/6/24: CT CAP:Since July 11, 2023, significantly decreased left upper lobe lesion. No suspicious lymphadenopathy. No new disease in the chest abdomen or pelvis. Stable incidental findings above. 2/23/24: MRI HEAD: No evidence of abnormal enhancement to suggest residual/recurrent metastatic disease. Chronic lacunar infarctions. Small right temporal occipital chronic infarction. Tiny left cerebellar chronic infarction. Microvascular disease.  6/14/2024: CT CAP: Interval increase in left upper lobe pulmonary lesion. Suggestion of increase in left hepatic lesion, likely metastatic. Suggestion of increased soft tissue density and enlargement of cervix, not well visualized on this exam. New fluid collection in the subcutaneous tissues of the left gluteal region may represent an evolving hematoma from patient's recent fall in May 2024.  MRIB: Peripherally enhancing lesions are identified as described above. These findings are likely compatible with metastasis (progression of patient's underlying disease process) given patient's history.  9/12/2024 Onc clinic 74-year-old female with CKD, DM, RA, HTN who presents to the clinic for follow up of NSCLC with BMs (PDL1 negative, STK11, PIK3CA, TP53). Patient is here today for follow up and tx. Patient reports of being more compliant with the type of food she eats due to her chronic hyperglycemia prior to tx. She denies any dizziness, n,v, d, sob, Abd pain, neuropathy or recent falls.  [de-identified] : Adenocarcinoma - STK11, PIK3CA, TP53, VUS in ALK, PDL1 negative [de-identified] : [place]c ([facility] [place] Dr.Rosa Jeffrey NeuroSx: Dr.D'Amico [facility]: Dr. Wernicke [FreeTextEntry1] : 9/7/2023: Taxol/pembrolizumab C1 given at [facility] [place] (no carboplatin due to national shortage) 10/26/2023: C2 carboplatin/Taxol/pembrolizumab 11/16/23 C3 carboplatin/taxol/pembro (held due to hyperglycemia, elevated lfts and hyponatremia) 11/22/23 C3 carboplatin/taxol/pembro 12/13/23 C4 held due to hyperglycemia. 1/9/2024: C4 carboplatin/Taxol/pembrolizumab 3/12/2024 C1 pembro 4/2/2024. C2 pembro 4/23/2024. C3 pembro No Show due to hyperglyemia. Pt was seen in ED 5/1/2024. C3 pembro 6/6/2024: C4 pembro (delayed due to fall and rehab placement post hospitalization) 7/11/2024: C1 gemcitabine 7/23/2024: S/p SRS to BMs 8/22/2024: C2D1 gemcitabine 8/29/2024   C2D8 gemcitabine  9/12/2024   C3D1 gemcitabine   [de-identified] : Her sugar today is in 200 range. She is feeling ok. Taking her meds, including Eliquis.

## 2024-09-18 NOTE — PHYSICAL EXAM
[Normal] : affect appropriate [de-identified] : No rashes. 0.5cm healing scabbed circular lesion to right shin, without erythema, warmth, or fluctuance. [de-identified] : supple [de-identified] : RLLE 2+ pitting edema of RLE to knee. RLE  swollen, similar to last viist. [de-identified] : 5/5 strength in UE. 5/5 strength in RLE. 4+/5 strength in LLE. [de-identified] : AOx3. CN grossly intact. No focal neurologic deficits. Normal speech.

## 2024-09-18 NOTE — PHYSICAL EXAM
[Normal] : affect appropriate [de-identified] : No rashes. 0.5cm healing scabbed circular lesion to right shin, without erythema, warmth, or fluctuance. [de-identified] : supple [de-identified] : RLLE 2+ pitting edema of RLE to knee. RLE  swollen, similar to last viist. [de-identified] : 5/5 strength in UE. 5/5 strength in RLE. 4+/5 strength in LLE. [de-identified] : AOx3. CN grossly intact. No focal neurologic deficits. Normal speech.

## 2024-09-18 NOTE — HISTORY OF PRESENT ILLNESS
[Disease: _____________________] : Disease: [unfilled] [de-identified] : Noemi Minor is a 74-year-old female with CKD, DM, RA, HTN who presents to the clinic for follow up of NSCLC with BMs (PDL1 negative, STK11, PIK3CA, TP53).  Onc Hx: Ex-smoker with history of CKD, baseline creatinine is around 2, diabetes, rheumatoid arthritis, HTN. She was experiencing 1 week of daily falls with lower extremity weakness without dizziness, was admitted to [facility]. MRI brain showed 0.9 cm right frontal lobe mass with surrounding vasogenic edema. CT chest abdomen pelvis revealed left upper lobe mass 2.6 x 4.1 cm in posterior aspect of left upper lobe abutting major fissure. The mass extends to adjacent left hilum and laterally to left lateral pleura. 1.5 cm left lobe of liver lesion. 1.2 cm pancreatic body cystic lesion. 7/14/2023 bronchoscopy-lingular biopsy poorly differentiated adenocarcinoma, positive TTF-1, TMB 17.3, PD-L1 negative. Tier II: Variants of Potential Clinical Significance STK11 p.([id number]) PIK3CA p.([id number]) TP53 p.([id number]) Tier III: Variants of Unknown Clinical Significance ALK p.([id number]) 8/2023: [place] [facility] [place] her [place] chemotherapy cycle, Taxol/pembrolizumab only due to carboplatin shortage. Taxol was given because her labs there showed a GFR<45, excluding her from being able to receive pemetrexed. She tolerated treatment reasonably well, without any neuropathy or cytopenias. She lost he hair after first chemotherapy cycle. 9/5/2023: [place] at [facility] [place]: Left upper lobe perihilar hypermetabolic mass consistent with biopsy-proven malignancy. Mildly FDG avid right thyroid nodule, correlate with thyroid ultrasound. 9/7/2023: Received cycle 1 paclitaxel/pembrolizumab at [facility] [place] (Cr 1.2 there) 10/10/2023: MRIB: Since 7/11/2023, right posterior frontal enhancing lesion and vasogenic edema are completely resolved as a favorable response to therapy. No new enhancing lesion.  2/6/24: CT CAP:Since July 11, 2023, significantly decreased left upper lobe lesion. No suspicious lymphadenopathy. No new disease in the chest abdomen or pelvis. Stable incidental findings above. 2/23/24: MRI HEAD: No evidence of abnormal enhancement to suggest residual/recurrent metastatic disease. Chronic lacunar infarctions. Small right temporal occipital chronic infarction. Tiny left cerebellar chronic infarction. Microvascular disease.  6/14/2024: CT CAP: Interval increase in left upper lobe pulmonary lesion. Suggestion of increase in left hepatic lesion, likely metastatic. Suggestion of increased soft tissue density and enlargement of cervix, not well visualized on this exam. New fluid collection in the subcutaneous tissues of the left gluteal region may represent an evolving hematoma from patient's recent fall in May 2024.  MRIB: Peripherally enhancing lesions are identified as described above. These findings are likely compatible with metastasis (progression of patient's underlying disease process) given patient's history.  9/12/2024 Onc clinic 74-year-old female with CKD, DM, RA, HTN who presents to the clinic for follow up of NSCLC with BMs (PDL1 negative, STK11, PIK3CA, TP53). Patient is here today for follow up and tx. Patient reports of being more compliant with the type of food she eats due to her chronic hyperglycemia prior to tx. She denies any dizziness, n,v, d, sob, Abd pain, neuropathy or recent falls.  [de-identified] : Adenocarcinoma - STK11, PIK3CA, TP53, VUS in ALK, PDL1 negative [de-identified] : Medonc ([facility] [place]): Dr.Rosa Jeffrey NeuroSx: Dr.[facility]: Dr. Wernicke [FreeTextEntry1] : 9/7/2023: [place] [facility] [place] to [place] 10/26/2023: C2 carboplatin/Taxol/pembrolizumab 11/16/23 C3 carboplatin/taxol/pembro (held due to hyperglycemia, elevated lfts and hyponatremia) 11/22/23 C3 carboplatin/taxol/pembro 12/13/23 C4 held due to hyperglycemia. 1/9/2024: C4 carboplatin/Taxol/pembrolizumab 3/12/2024 C1 pembro 4/2/2024. C2 pembro 4/23/2024. C3 pembro No Show due to hyperglyemia. Pt was seen in ED 5/1/2024. C3 pembro 6/6/2024: C4 pembro (delayed due to fall and rehab placement post hospitalization) 7/11/2024: C1 gemcitabine 7/23/2024: S/p SRS to BMs 8/22/2024: C2D1 gemcitabine 8/29/2024   C2D8 gemcitabine  9/12/2024   C3D1 gemcitabine   [de-identified] : Her sugar today is in 200 range. She is feeling ok. Taking her meds, including Eliquis.

## 2024-09-18 NOTE — ASSESSMENT
[FreeTextEntry1] : 73 yo F with CKD, RA, DM, HTN here for NSCLC with BMs and liver met (STK11 mutant, PIK3CA mutant, TP53 mutant, PD-L1 negative).  #NSCLC - BMs s/p SRS, liver met. Received C1 paclitaxel/pembrolizumab on 9/7/2023 at [facility] [place]. This regimen was favored over carbo/pemetrexed/pembrolizumab due to CKD and baseline GFR less than 45. She continued carboplatin/Taxol/pembrolizumab due to CKD --Treatment was delayed and held several times because of uncontrolled hyperglycemia --patient very non-compliant with food restrictions --Post 4 cycles induction chemoIO showing partial response with CT CAP 2/6/2024 (90% reduction in lung mass) and MRIB 2/23/2024 (s/p SRS, n/e of residual BMs) --June 2024 she has progression of disease in the body and brain on maintenance pembrolizumab. Given her creatinine clearance of 41 she is not a candidate for targeted therapy unless her creatinine improves. --Treatment options, ideally she should get Alimta, however given her creatinine clearance I worry about significant cytopenias, also Alimta is contraindicated in patients with creatinine clearance of less than 45 therefore planned for single agent gemcitabine with therapy, given on day 1 and day 8 of each 21-day cycle (when hyperglycemia resolved). Discussed risks, benefits and treatment schedule. Risks include but are not limited to profound cytopenias, hair loss, rashes, constipation or diarrhea, abdominal pain. Informed consent signed. Educational materials provided. --Ordered CBC, CMP, TSH today --can proceed with gemcitabine today with aloxi only as premedication; with Neulasta Onpro --RTC in 2 wks for C3D1 --patient is on intensive treatment with gemcitabine requiring monitoring for toxicity with CBC, CMP every D1 and D8 of each 21 day cycle  #R calf DVT Typically distal DVTs do not require AC, however when symptomatic AC is indication. She was started on Eliquis on her early August hospitalization when DVT was discovered. Her US from most recent hospitalization shows DVT resolution She fell again last week. --discussed with . Summary of discussion was given that the patient has frequent falls to stop Eliquis now. --patient instructed to stop Eliquis   #Brain metastases --s/p SRS on new lesion  #CKD  --stable  #Rheumatoid arthritis --Being managed by Dr. Johnston  RTC in 2 wks Labs: CBC, CMP, TSH  9/12/2024 onc clinic a/p NSCLC with mets  Labs: stable C/w monitoring labs and s/s  C/w all current meds as indicated  Proceed with C3D1 gemcitabine  Will schedule scans after this tx. (CAP & MRIB )  RTC  9/19 for F/u and tx.  .Encourage to contact the office for any concerns or worsening symptoms. Pt verbalizes understanding

## 2024-09-18 NOTE — HISTORY OF PRESENT ILLNESS
[Disease: _____________________] : Disease: [unfilled] [de-identified] : Noemi Minor is a 74-year-old female with CKD, DM, RA, HTN who presents to the clinic for follow up of NSCLC with BMs (PDL1 negative, STK11, PIK3CA, TP53).  Onc Hx: Ex-smoker with history of CKD, baseline creatinine is around 2, diabetes, rheumatoid arthritis, HTN. She was experiencing 1 week of daily falls with lower extremity weakness without dizziness, was admitted to [facility]. MRI brain showed 0.9 cm right frontal lobe mass with surrounding vasogenic edema. CT chest abdomen pelvis revealed left upper lobe mass 2.6 x 4.1 cm in posterior aspect of left upper lobe abutting major fissure. The mass extends to adjacent left hilum and laterally to left lateral pleura. 1.5 cm left lobe of liver lesion. 1.2 cm pancreatic body cystic lesion. 7/14/2023 bronchoscopy-lingular biopsy poorly differentiated adenocarcinoma, positive TTF-1, TMB 17.3, PD-L1 negative. Tier II: Variants of Potential Clinical Significance STK11 p.([id number]) PIK3CA p.([id number]) TP53 p.([id number]) Tier III: Variants of Unknown Clinical Significance ALK p.([id number]) 8/2023: She followed up at [facility] [place] where she received her first chemotherapy cycle, Taxol/pembrolizumab only due to carboplatin shortage. Taxol was given because her labs there showed a GFR<45, excluding her from being able to receive pemetrexed. She tolerated treatment reasonably well, without any neuropathy or cytopenias. She lost he hair after first chemotherapy cycle. 9/5/2023: PET at [facility] [place]: Left upper lobe perihilar hypermetabolic mass consistent with biopsy-proven malignancy. Mildly FDG avid right thyroid nodule, correlate with thyroid ultrasound. 9/7/2023: Received cycle 1 paclitaxel/pembrolizumab at [facility] [place] (Cr 1.2 there) 10/10/2023: MRIB: Since 7/11/2023, right posterior frontal enhancing lesion and vasogenic edema are completely resolved as a favorable response to therapy. No new enhancing lesion.  2/6/24: CT CAP:Since July 11, 2023, significantly decreased left upper lobe lesion. No suspicious lymphadenopathy. No new disease in the chest abdomen or pelvis. Stable incidental findings above. 2/23/24: MRI HEAD: No evidence of abnormal enhancement to suggest residual/recurrent metastatic disease. Chronic lacunar infarctions. Small right temporal occipital chronic infarction. Tiny left cerebellar chronic infarction. Microvascular disease.  6/14/2024: CT CAP: Interval increase in left upper lobe pulmonary lesion. Suggestion of increase in left hepatic lesion, likely metastatic. Suggestion of increased soft tissue density and enlargement of cervix, not well visualized on this exam. New fluid collection in the subcutaneous tissues of the left gluteal region may represent an evolving hematoma from patient's recent fall in May 2024.  MRIB: Peripherally enhancing lesions are identified as described above. These findings are likely compatible with metastasis (progression of patient's underlying disease process) given patient's history.  9/12/2024 Onc clinic 74-year-old female with CKD, DM, RA, HTN who presents to the clinic for follow up of NSCLC with BMs (PDL1 negative, STK11, PIK3CA, TP53). Patient is here today for follow up and tx. Patient reports of being more compliant with the type of food she eats due to her chronic hyperglycemia prior to tx. She denies any dizziness, n,v, d, sob, Abd pain, neuropathy or recent falls.  [de-identified] : Adenocarcinoma - STK11, PIK3CA, TP53, VUS in ALK, PDL1 negative [de-identified] : [place]onc ([facility] [place]): Dr.Rosa Jeffrey NeuroSx: Dr.[facility]: Dr. Wernicke [FreeTextEntry1] : 9/7/2023: Taxol/pembrolizumab [place]1 [place] [facility] [place] ([place] due to national shortage) 10/26/2023: C2 carboplatin/Taxol/pembrolizumab 11/16/23 C3 carboplatin/taxol/pembro (held due to hyperglycemia, elevated lfts and hyponatremia) 11/22/23 C3 carboplatin/taxol/pembro 12/13/23 C4 held due to hyperglycemia. 1/9/2024: C4 carboplatin/Taxol/pembrolizumab 3/12/2024 C1 pembro 4/2/2024. C2 pembro 4/23/2024. C3 pembro No Show due to hyperglyemia. Pt was seen in ED 5/1/2024. C3 pembro 6/6/2024: C4 pembro (delayed due to fall and rehab placement post hospitalization) 7/11/2024: C1 gemcitabine 7/23/2024: S/p SRS to BMs 8/22/2024: C2D1 gemcitabine 8/29/2024   C2D8 gemcitabine  9/12/2024   C3D1 gemcitabine   [de-identified] : Her sugar today is in 200 range. She is feeling ok. Taking her meds, including Eliquis.

## 2024-09-18 NOTE — PHYSICAL EXAM
[Normal] : affect appropriate [de-identified] : No rashes. 0.5cm healing scabbed circular lesion to right shin, without erythema, warmth, or fluctuance. [de-identified] : supple [de-identified] : RLLE 2+ pitting edema of RLE to knee. RLE  swollen, similar to last viist. [de-identified] : 5/5 strength in UE. 5/5 strength in RLE. 4+/5 strength in LLE. [de-identified] : AOx3. CN grossly intact. No focal neurologic deficits. Normal speech.

## 2024-09-18 NOTE — ASSESSMENT
[FreeTextEntry1] : 75 yo F with CKD, RA, DM, HTN here for NSCLC with BMs and liver met (STK11 mutant, PIK3CA mutant, TP53 mutant, PD-L1 negative).  #NSCLC - BMs s/p SRS, liver met. Received C1 paclitaxel/pembrolizumab on 9/7/2023 at [facility] [place]. This regimen was favored over carbo/pemetrexed/pembrolizumab due to CKD and baseline GFR less than 45. She continued carboplatin/Taxol/pembrolizumab due to CKD --Treatment was delayed and held several times because of uncontrolled hyperglycemia --patient very non-compliant with food restrictions --Post 4 cycles induction chemoIO showing partial response with CT CAP 2/6/2024 (90% reduction in lung mass) and MRIB 2/23/2024 (s/p SRS, n/e of residual BMs) --June 2024 she has progression of disease in the body and brain on maintenance pembrolizumab. Given her creatinine clearance of 41 she is not a candidate for targeted therapy unless her creatinine improves. --Treatment options, ideally she should get Alimta, however given her creatinine clearance I worry about significant cytopenias, also Alimta is contraindicated in patients with creatinine clearance of less than 45 therefore planned for single agent gemcitabine with therapy, given on day 1 and day 8 of each 21-day cycle (when hyperglycemia resolved). Discussed risks, benefits and treatment schedule. Risks include but are not limited to profound cytopenias, hair loss, rashes, constipation or diarrhea, abdominal pain. Informed consent signed. Educational materials provided. --Ordered CBC, CMP, TSH today --can proceed with gemcitabine today with aloxi only as premedication; with Neulasta Onpro --RTC in 2 wks for C3D1 --patient is on intensive treatment with gemcitabine requiring monitoring for toxicity with CBC, CMP every D1 and D8 of each 21 day cycle  #R calf DVT Typically distal DVTs do not require AC, however when symptomatic AC is indication. She was started on Eliquis on her early August hospitalization when DVT was discovered. Her US from most recent hospitalization shows DVT resolution She fell again last week. --discussed with . Summary of discussion was given that the patient has frequent falls to stop Eliquis now. --patient instructed to stop Eliquis   #Brain metastases --s/p SRS on new lesion  #CKD  --stable  #Rheumatoid arthritis --Being managed by Dr. Johnston  RTC in 2 wks Labs: CBC, CMP, TSH  9/12/2024 onc clinic a/p NSCLC with mets  Labs: stable C/w monitoring labs and s/s  C/w all current meds as indicated  Proceed with C3D1 gemcitabine  Will schedule scans after this tx. (CAP & MRIB )  RTC  9/19 for F/u and tx.  .Encourage to contact the office for any concerns or worsening symptoms. Pt verbalizes understanding

## 2024-09-18 NOTE — REVIEW OF SYSTEMS
[Fever] : no fever [Chills] : no chills [Night Sweats] : no night sweats [Eye Pain] : no eye pain [Vision Problems] : no vision problems [Dysphagia] : no dysphagia [Odynophagia] : no odynophagia [Chest Pain] : no chest pain [Palpitations] : no palpitations [Shortness Of Breath] : no shortness of breath [Wheezing] : no wheezing [Abdominal Pain] : no abdominal pain [Vomiting] : no vomiting [Dysuria] : no dysuria [Incontinence] : no incontinence [Joint Pain] : no joint pain [Joint Stiffness] : no joint stiffness [Skin Rash] : no skin rash [Fainting] : no fainting [Muscle Weakness] : no muscle weakness [Swollen Glands] : no swollen glands [Negative] : Allergic/Immunologic

## 2024-09-18 NOTE — ASSESSMENT
[FreeTextEntry1] : 73 yo F with CKD, RA, DM, HTN here for NSCLC with BMs and liver met (STK11 mutant, PIK3CA mutant, TP53 mutant, PD-L1 negative).  #NSCLC - BMs s/p SRS, liver met. Received C1 paclitaxel/pembrolizumab on 9/7/2023 [place] [facility] [place] This regimen was favored over carbo/pemetrexed/pembrolizumab due to CKD and baseline GFR less than 45. She continued carboplatin/Taxol/pembrolizumab due to CKD --Treatment was delayed and held several times because of uncontrolled hyperglycemia --patient very non-compliant with food restrictions --Post 4 cycles induction chemoIO showing partial response with CT CAP 2/6/2024 (90% reduction in lung mass) and MRIB 2/23/2024 (s/p SRS, n/e of residual BMs) --June 2024 she has progression of disease in the body and brain on maintenance pembrolizumab. Given her creatinine clearance of 41 she is not a candidate for targeted therapy unless her creatinine improves. --Treatment options, ideally she should get Alimta, however given her creatinine clearance I worry about significant cytopenias, also Alimta is contraindicated in patients with creatinine clearance of less than 45 therefore planned for single agent gemcitabine with therapy, given on day 1 and day 8 of each 21-day cycle (when hyperglycemia resolved). Discussed risks, benefits and treatment schedule. Risks include but are not limited to profound cytopenias, hair loss, rashes, constipation or diarrhea, abdominal pain. Informed consent signed. Educational materials provided. --Ordered CBC, CMP, TSH today --can proceed with gemcitabine today with aloxi only as premedication; with Neulasta Onpro --RTC in 2 wks for C3D1 --patient is on intensive treatment with gemcitabine requiring monitoring for toxicity with CBC, CMP every D1 and D8 of each 21 day cycle  #R calf DVT Typically distal DVTs do not require AC, however when symptomatic AC is indication. She was started on Eliquis on her early August hospitalization when DVT was discovered. Her US from most recent hospitalization shows DVT resolution She fell again last week. --discussed with . Summary of discussion was given that the patient has frequent falls to stop Eliquis now. --patient instructed to stop Eliquis   #Brain metastases --s/p SRS on new lesion  #CKD  --stable  #Rheumatoid arthritis --Being managed by Dr. Johnston  RTC in 2 wks Labs: CBC, CMP, TSH  9/12/2024 onc clinic a/p NSCLC with mets  Labs: stable C/w monitoring labs and s/s  C/w all current meds as indicated  Proceed with C3D1 gemcitabine  Will schedule scans after this tx. (CAP & MRIB )  RTC  9/19 for F/u and tx.  .Encourage to contact the office for any concerns or worsening symptoms. Pt verbalizes understanding

## 2024-09-19 ENCOUNTER — OUTPATIENT (OUTPATIENT)
Dept: OUTPATIENT SERVICES | Facility: HOSPITAL | Age: 74
LOS: 1 days | End: 2024-09-19
Payer: MEDICARE

## 2024-09-19 ENCOUNTER — APPOINTMENT (OUTPATIENT)
Dept: INFUSION THERAPY | Facility: CLINIC | Age: 74
End: 2024-09-19

## 2024-09-19 ENCOUNTER — APPOINTMENT (OUTPATIENT)
Dept: HEMATOLOGY ONCOLOGY | Facility: CLINIC | Age: 74
End: 2024-09-19
Payer: MEDICARE

## 2024-09-19 ENCOUNTER — NON-APPOINTMENT (OUTPATIENT)
Age: 74
End: 2024-09-19

## 2024-09-19 VITALS
HEART RATE: 78 BPM | SYSTOLIC BLOOD PRESSURE: 110 MMHG | HEIGHT: 63 IN | TEMPERATURE: 97.6 F | DIASTOLIC BLOOD PRESSURE: 54 MMHG | RESPIRATION RATE: 18 BRPM | OXYGEN SATURATION: 100 % | WEIGHT: 151 LBS | BODY MASS INDEX: 26.75 KG/M2

## 2024-09-19 VITALS
OXYGEN SATURATION: 98 % | DIASTOLIC BLOOD PRESSURE: 65 MMHG | HEIGHT: 63 IN | RESPIRATION RATE: 16 BRPM | TEMPERATURE: 98 F | WEIGHT: 151.02 LBS | SYSTOLIC BLOOD PRESSURE: 110 MMHG | HEART RATE: 91 BPM

## 2024-09-19 VITALS
OXYGEN SATURATION: 99 % | SYSTOLIC BLOOD PRESSURE: 128 MMHG | HEART RATE: 75 BPM | RESPIRATION RATE: 16 BRPM | TEMPERATURE: 98 F | DIASTOLIC BLOOD PRESSURE: 75 MMHG

## 2024-09-19 DIAGNOSIS — R29.6 REPEATED FALLS: ICD-10-CM

## 2024-09-19 DIAGNOSIS — C34.90 MALIGNANT NEOPLASM OF UNSPECIFIED PART OF UNSPECIFIED BRONCHUS OR LUNG: ICD-10-CM

## 2024-09-19 DIAGNOSIS — M06.9 RHEUMATOID ARTHRITIS, UNSPECIFIED: ICD-10-CM

## 2024-09-19 DIAGNOSIS — Z96.641 PRESENCE OF RIGHT ARTIFICIAL HIP JOINT: Chronic | ICD-10-CM

## 2024-09-19 DIAGNOSIS — C79.31 MALIGNANT NEOPLASM OF UNSPECIFIED PART OF UNSPECIFIED BRONCHUS OR LUNG: ICD-10-CM

## 2024-09-19 DIAGNOSIS — I82.409 ACUTE EMBOLISM AND THROMBOSIS OF UNSPECIFIED DEEP VEINS OF UNSPECIFIED LOWER EXTREMITY: ICD-10-CM

## 2024-09-19 DIAGNOSIS — N18.4 CHRONIC KIDNEY DISEASE, STAGE 4 (SEVERE): ICD-10-CM

## 2024-09-19 LAB
ALBUMIN SERPL ELPH-MCNC: 2.7 G/DL
ALP BLD-CCNC: 86 U/L
ALT SERPL-CCNC: 12 U/L
ANION GAP SERPL CALC-SCNC: 8 MMOL/L
AST SERPL-CCNC: 22 U/L
BILIRUB SERPL-MCNC: 0.5 MG/DL
BUN SERPL-MCNC: 28 MG/DL
CALCIUM SERPL-MCNC: 9.2 MG/DL
CHLORIDE SERPL-SCNC: 104 MMOL/L
CO2 SERPL-SCNC: 27 MMOL/L
CREAT SERPL-MCNC: 1.7 MG/DL
EGFR: 31 ML/MIN/1.73M2
GLUCOSE SERPL-MCNC: 73 MG/DL
HCT VFR BLD CALC: 23.4 %
HGB BLD-MCNC: 7.5 G/DL
LYMPHOCYTES # BLD AUTO: 0.6 K/UL
LYMPHOCYTES NFR BLD AUTO: 5.3 %
MAN DIFF?: NO
MCHC RBC-ENTMCNC: 29 PG
MCHC RBC-ENTMCNC: 32.1 GM/DL
MCV RBC AUTO: 90.3 FL
NEUTROPHILS # BLD AUTO: 10.2 K/UL
NEUTROPHILS NFR BLD AUTO: 89.4 %
PLATELET # BLD AUTO: 291 K/UL
POTASSIUM SERPL-SCNC: 4.2 MMOL/L
PROT SERPL-MCNC: 6.9 G/DL
RBC # BLD: 2.59 M/UL
RBC # FLD: 18.8 %
SODIUM SERPL-SCNC: 139 MMOL/L
WBC # FLD AUTO: 11.4 K/UL

## 2024-09-19 PROCEDURE — 96367 TX/PROPH/DG ADDL SEQ IV INF: CPT

## 2024-09-19 PROCEDURE — 86901 BLOOD TYPING SEROLOGIC RH(D): CPT

## 2024-09-19 PROCEDURE — 86850 RBC ANTIBODY SCREEN: CPT

## 2024-09-19 PROCEDURE — 99214 OFFICE O/P EST MOD 30 MIN: CPT

## 2024-09-19 PROCEDURE — 36415 COLL VENOUS BLD VENIPUNCTURE: CPT

## 2024-09-19 PROCEDURE — 96413 CHEMO IV INFUSION 1 HR: CPT

## 2024-09-19 PROCEDURE — 86900 BLOOD TYPING SEROLOGIC ABO: CPT

## 2024-09-19 PROCEDURE — G2211 COMPLEX E/M VISIT ADD ON: CPT

## 2024-09-19 PROCEDURE — 96375 TX/PRO/DX INJ NEW DRUG ADDON: CPT

## 2024-09-19 RX ORDER — PALONOSETRON HYDROCHLORIDE 0.25 MG/5ML
0.25 INJECTION INTRAVENOUS ONCE
Refills: 0 | Status: COMPLETED | OUTPATIENT
Start: 2024-09-19 | End: 2024-09-19

## 2024-09-19 RX ADMIN — PALONOSETRON HYDROCHLORIDE 0.25 MILLIGRAM(S): 0.25 INJECTION INTRAVENOUS at 14:54

## 2024-09-19 RX ADMIN — SODIUM CHLORIDE 10 MILLILITER(S): 9 INJECTION INTRAMUSCULAR; INTRAVENOUS; SUBCUTANEOUS at 16:05

## 2024-09-19 RX ADMIN — GEMCITABINE 1700 MILLIGRAM(S): 10 INJECTION, SOLUTION INTRAVENOUS at 16:00

## 2024-09-19 RX ADMIN — GEMCITABINE 1700 MILLIGRAM(S): 10 INJECTION, SOLUTION INTRAVENOUS at 15:25

## 2024-09-19 RX ADMIN — PEGFILGRASTIM-CBQV 6 MILLIGRAM(S): 6 INJECTION, SOLUTION SUBCUTANEOUS at 16:10

## 2024-09-19 NOTE — PHARMACY COMMUNICATION NOTE - COMMENTS
Pt's Hgb is 7.5 today which is below the parameter 8,  Dr Delaney is aware and would like to proceed chemo tx today. She will order 1 unit of PRBC order

## 2024-09-20 ENCOUNTER — APPOINTMENT (OUTPATIENT)
Dept: INFUSION THERAPY | Facility: CLINIC | Age: 74
End: 2024-09-20

## 2024-09-20 ENCOUNTER — OUTPATIENT (OUTPATIENT)
Dept: OUTPATIENT SERVICES | Facility: HOSPITAL | Age: 74
LOS: 1 days | End: 2024-09-20
Payer: MEDICARE

## 2024-09-20 VITALS
HEART RATE: 74 BPM | SYSTOLIC BLOOD PRESSURE: 112 MMHG | HEIGHT: 63 IN | WEIGHT: 151.02 LBS | TEMPERATURE: 98 F | DIASTOLIC BLOOD PRESSURE: 74 MMHG | RESPIRATION RATE: 18 BRPM | OXYGEN SATURATION: 99 %

## 2024-09-20 DIAGNOSIS — C34.90 MALIGNANT NEOPLASM OF UNSPECIFIED PART OF UNSPECIFIED BRONCHUS OR LUNG: ICD-10-CM

## 2024-09-20 PROCEDURE — 86923 COMPATIBILITY TEST ELECTRIC: CPT

## 2024-09-20 PROCEDURE — P9040: CPT

## 2024-09-20 PROCEDURE — 36430 TRANSFUSION BLD/BLD COMPNT: CPT

## 2024-09-20 NOTE — REVIEW OF SYSTEMS
[Fatigue] : fatigue [Lower Ext Edema] : lower extremity edema [Fever] : no fever [Chills] : no chills [Night Sweats] : no night sweats [Eye Pain] : no eye pain [Vision Problems] : no vision problems [Dysphagia] : no dysphagia [Odynophagia] : no odynophagia [Chest Pain] : no chest pain [Palpitations] : no palpitations [Shortness Of Breath] : no shortness of breath [Wheezing] : no wheezing [Abdominal Pain] : no abdominal pain [Vomiting] : no vomiting [Dysuria] : no dysuria [Incontinence] : no incontinence [Joint Pain] : no joint pain [Joint Stiffness] : no joint stiffness [Skin Rash] : no skin rash [Fainting] : no fainting [Muscle Weakness] : no muscle weakness [Swollen Glands] : no swollen glands [de-identified] : +pruritus

## 2024-09-20 NOTE — ASSESSMENT
[FreeTextEntry1] : 75 yo F with CKD, RA, DM, HTN here for NSCLC with BMs and liver met (STK11 mutant, PIK3CA mutant, TP53 mutant, PD-L1 negative).  #NSCLC - BMs s/p SRS, liver met. Received C1 paclitaxel/pembrolizumab on 9/7/2023 at [facility] [place]. This regimen was favored over carbo/pemetrexed/pembrolizumab due to CKD and baseline GFR less than 45. She continued carboplatin/Taxol/pembrolizumab due to CKD --Treatment was delayed and held several times because of uncontrolled hyperglycemia --patient very non-compliant with food restrictions --Post 4 cycles induction chemoIO showing partial response with CT CAP 2/6/2024 (90% reduction in lung mass) and MRIB 2/23/2024 (s/p SRS, n/e of residual BMs) --June 2024 she has progression of disease in the body and brain on maintenance pembrolizumab. Given her creatinine clearance of 41 she is not a candidate for targeted therapy unless her creatinine improves. --Treatment options, ideally she should get Alimta, however given her creatinine clearance I worry about significant cytopenias, also Alimta is contraindicated in patients with creatinine clearance of less than 45 therefore planned for single agent gemcitabine with therapy, given on day 1 and day 8 of each 21-day cycle (when hyperglycemia resolved). Discussed risks, benefits and treatment schedule. Risks include but are not limited to profound cytopenias, hair loss, rashes, constipation or diarrhea, abdominal pain. Informed consent signed. Educational materials provided. --Ordered CBC, CMP, TSH today --can proceed with gemcitabine today with aloxi only as premedication; with Neulasta Onpro --RTC in 2 wks for C4D1 --patient is on intensive treatment with gemcitabine requiring monitoring for toxicity with CBC, CMP every D1 and D8 of each 21 day cycle  #anemia Hemoglobin today was 7.5, likely chemotherapy induced BM suppression -will order 1U pRBCs for transfusion, either today or tomorrow   #R calf DVT --patient reminded to stop eliquis  #Brain metastases --s/p SRS on new lesion  #CKD  --stable  #Rheumatoid arthritis --Being managed by Dr. Johnston.  --She has been taking 2mg Decadron during flares  RTC tomorrow

## 2024-09-20 NOTE — PHYSICAL EXAM
[Normal] : affect appropriate [de-identified] : supple [de-identified] : Bilateral calf tenderness. 2+ pitting edema of RLE to knee. RLE tender and swollen, similar to last viist. [de-identified] : 5/5 strength in UE. 5/5 strength in RLE. 4+/5 strength in LLE. [de-identified] : flaky skin on bilateral lower extremities [de-identified] : AOx3. CN grossly intact. No focal neurologic deficits. Normal speech.

## 2024-09-20 NOTE — ASSESSMENT
[FreeTextEntry1] : 73 yo F with CKD, RA, DM, HTN here for NSCLC with BMs and liver met (STK11 mutant, PIK3CA mutant, TP53 mutant, PD-L1 negative).  #NSCLC - BMs s/p SRS, liver met. [place]1 paclitaxel[place] [facility] [place] regimen was favored over carbo/pemetrexed/pembrolizumab due to CKD and baseline GFR less than 45. She continued carboplatin/Taxol/pembrolizumab due to CKD --Treatment was delayed and held several times because of uncontrolled hyperglycemia --patient very non-compliant with food restrictions --Post 4 cycles induction chemoIO showing partial response with CT CAP 2/6/2024 (90% reduction in lung mass) and MRIB 2/23/2024 (s/p SRS, n/e of residual BMs) --June 2024 she has progression of disease in the body and brain on maintenance pembrolizumab. Given her creatinine clearance of 41 she is not a candidate for targeted therapy unless her creatinine improves. --Treatment options, ideally she should get Alimta, however given her creatinine clearance I worry about significant cytopenias, also Alimta is contraindicated in patients with creatinine clearance of less than 45 therefore planned for single agent gemcitabine with therapy, given on day 1 and day 8 of each 21-day cycle (when hyperglycemia resolved). Discussed risks, benefits and treatment schedule. Risks include but are not limited to profound cytopenias, hair loss, rashes, constipation or diarrhea, abdominal pain. Informed consent signed. Educational materials provided. --Ordered CBC, CMP, TSH today --can proceed with gemcitabine today with aloxi only as premedication; with Neulasta Onpro --RTC in 2 wks for C4D1 --patient is on intensive treatment with gemcitabine requiring monitoring for toxicity with CBC, CMP every D1 and D8 of each 21 day cycle  #anemia Hemoglobin today was 7.5, likely chemotherapy induced BM suppression -will order 1U pRBCs for transfusion, either today or tomorrow   #R calf DVT --patient reminded to stop eliquis  #Brain metastases --s/p SRS on new lesion  #CKD  --stable  #Rheumatoid arthritis --Being managed by Dr. Johnston.  --She has been taking 2mg Decadron during flares  RTC tomorrow

## 2024-09-20 NOTE — PHYSICAL EXAM
[Normal] : affect appropriate [de-identified] : supple [de-identified] : Bilateral calf tenderness. 2+ pitting edema of RLE to knee. RLE tender and swollen, similar to last viist. [de-identified] : 5/5 strength in UE. 5/5 strength in RLE. 4+/5 strength in LLE. [de-identified] : flaky skin on bilateral lower extremities [de-identified] : AOx3. CN grossly intact. No focal neurologic deficits. Normal speech.

## 2024-09-20 NOTE — REVIEW OF SYSTEMS
[Fatigue] : fatigue [Lower Ext Edema] : lower extremity edema [Fever] : no fever [Chills] : no chills [Night Sweats] : no night sweats [Eye Pain] : no eye pain [Vision Problems] : no vision problems [Dysphagia] : no dysphagia [Odynophagia] : no odynophagia [Chest Pain] : no chest pain [Palpitations] : no palpitations [Shortness Of Breath] : no shortness of breath [Wheezing] : no wheezing [Abdominal Pain] : no abdominal pain [Vomiting] : no vomiting [Dysuria] : no dysuria [Incontinence] : no incontinence [Joint Pain] : no joint pain [Joint Stiffness] : no joint stiffness [Skin Rash] : no skin rash [Fainting] : no fainting [Muscle Weakness] : no muscle weakness [Swollen Glands] : no swollen glands [de-identified] : +pruritus

## 2024-09-20 NOTE — REVIEW OF SYSTEMS
[Fatigue] : fatigue [Lower Ext Edema] : lower extremity edema [Fever] : no fever [Chills] : no chills [Night Sweats] : no night sweats [Eye Pain] : no eye pain [Vision Problems] : no vision problems [Dysphagia] : no dysphagia [Odynophagia] : no odynophagia [Chest Pain] : no chest pain [Palpitations] : no palpitations [Shortness Of Breath] : no shortness of breath [Wheezing] : no wheezing [Abdominal Pain] : no abdominal pain [Vomiting] : no vomiting [Dysuria] : no dysuria [Incontinence] : no incontinence [Joint Pain] : no joint pain [Joint Stiffness] : no joint stiffness [Skin Rash] : no skin rash [Fainting] : no fainting [Muscle Weakness] : no muscle weakness [Swollen Glands] : no swollen glands [de-identified] : +pruritus

## 2024-09-20 NOTE — HISTORY OF PRESENT ILLNESS
[Disease: _____________________] : Disease: [unfilled] [90: Able to carry normal activity; minor signs or symptoms of disease.] : 90: Able to carry normal activity; minor signs or symptoms of disease.  [ECOG Performance Status: 1 - Restricted in physically strenuous activity but ambulatory and able to carry out work of a light or sedentary nature] : Performance Status: 1 - Restricted in physically strenuous activity but ambulatory and able to carry out work of a light or sedentary nature, e.g., light house work, office work [de-identified] : Noemi Minor is a 74-year-old female with CKD, DM, RA, HTN who presents to the clinic for follow up of NSCLC with BMs (PDL1 negative, STK11, PIK3CA, TP53).  Onc Hx: Ex-smoker with history of CKD, baseline creatinine is around 2, diabetes, rheumatoid arthritis, HTN. She was experiencing 1 week of daily falls with lower extremity weakness without dizziness, was admitted to [facility]. MRI brain showed 0.9 cm right frontal lobe mass with surrounding vasogenic edema. CT chest abdomen pelvis revealed left upper lobe mass 2.6 x 4.1 cm in posterior aspect of left upper lobe abutting major fissure. The mass extends to adjacent left hilum and laterally to left lateral pleura. 1.5 cm left lobe of liver lesion. 1.2 cm pancreatic body cystic lesion. 7/14/2023 bronchoscopy-lingular biopsy poorly differentiated adenocarcinoma, positive TTF-1, TMB 17.3, PD-L1 negative. Tier II: Variants of Potential Clinical Significance STK11 p.([id number]) PIK3CA p.([id number]) TP53 p.([id number]) Tier III: Variants of Unknown Clinical Significance ALK p.([id number]) 8/2023: She [place] at [facility] [place] where she received her first chemotherapy cycle, Taxol/pembrolizumab only due to carboplatin shortage. Taxol was given because her labs there showed a GFR<45, excluding her from being able to receive pemetrexed. She tolerated treatment reasonably well, without any neuropathy or cytopenias. She lost he hair after first chemotherapy cycle. 9/5/2023: PET at [facility] [place]: Left upper lobe perihilar hypermetabolic mass consistent with biopsy-proven malignancy. Mildly FDG avid right thyroid nodule, correlate with thyroid ultrasound. 9/7/2023: Received cycle 1 paclitaxel/pembrolizumab at [facility] [place] (Cr 1.2 there) 10/10/2023: MRIB: Since 7/11/2023, right posterior frontal enhancing lesion and vasogenic edema are completely resolved as a favorable response to therapy. No new enhancing lesion.  2/6/24: CT CAP:Since July 11, 2023, significantly decreased left upper lobe lesion. No suspicious lymphadenopathy. No new disease in the chest abdomen or pelvis. Stable incidental findings above. 2/23/24: MRI HEAD: No evidence of abnormal enhancement to suggest residual/recurrent metastatic disease. Chronic lacunar infarctions. Small right temporal occipital chronic infarction. Tiny left cerebellar chronic infarction. Microvascular disease.  6/14/2024: CT CAP: Interval increase in left upper lobe pulmonary lesion. Suggestion of increase in left hepatic lesion, likely metastatic. Suggestion of increased soft tissue density and enlargement of cervix, not well visualized on this exam. New fluid collection in the subcutaneous tissues of the left gluteal region may represent an evolving hematoma from patient's recent fall in May 2024.  MRIB: Peripherally enhancing lesions are identified as described above. These findings are likely compatible with metastasis (progression of patient's underlying disease process) given patient's history. [de-identified] : Adenocarcinoma - STK11, PIK3CA, TP53, VUS in ALK, PDL1 negative [de-identified] : Medonc ([facility] [place]): Dr.Rosa Jeffrey NeuroSx: Dr.D'Amico [facility]: Dr. Wernicke [FreeTextEntry1] : 9/7/2023: Taxol/pembrolizumab C1 given at [facility] [place] (no carboplatin due to national shortage) 10/26/2023: C2 carboplatin/Taxol/pembrolizumab 11/16/23 C3 carboplatin/taxol/pembro (held due to hyperglycemia, elevated lfts and hyponatremia) 11/22/23 C3 carboplatin/taxol/pembro 12/13/23 C4 held due to hyperglycemia. 1/9/2024: C4 carboplatin/Taxol/pembrolizumab 3/12/2024 C1 pembro 4/2/2024. C2 pembro 4/23/2024. C3 pembro No Show due to hyperglyemia. Pt was seen in ED 5/1/2024. C3 pembro 6/6/2024: C4 pembro (delayed due to fall and rehab placement post hospitalization) 7/11/2024: C1 gemcitabine 7/23/2024: S/p SRS to BMs 8/22/2024: C2D1 gemcitabine 8/29/2024: C2D8 gemicitabine 9/12/2024: C3D1 gemcitabine 9/19/2024: C3D8 gemcitabine [de-identified] : She has not stopped taking Eliquis because she forgot that discontinuation was discussed at last visit. She denies any falls in the past week. Reports fatigue in addition to bilateral lower extremity swelling and pain worse on the right leg.  Also reports pruritus in the legs.

## 2024-09-20 NOTE — HISTORY OF PRESENT ILLNESS
[Disease: _____________________] : Disease: [unfilled] [90: Able to carry normal activity; minor signs or symptoms of disease.] : 90: Able to carry normal activity; minor signs or symptoms of disease.  [ECOG Performance Status: 1 - Restricted in physically strenuous activity but ambulatory and able to carry out work of a light or sedentary nature] : Performance Status: 1 - Restricted in physically strenuous activity but ambulatory and able to carry out work of a light or sedentary nature, e.g., light house work, office work [de-identified] : Noemi Minor is a 74-year-old female with CKD, DM, RA, HTN who presents to the clinic for follow up of NSCLC with BMs (PDL1 negative, STK11, PIK3CA, TP53).  Onc Hx: Ex-smoker with history of CKD, baseline creatinine is around 2, diabetes, rheumatoid arthritis, HTN. She was experiencing 1 week of daily falls with lower extremity weakness without dizziness, was admitted to [facility]. MRI brain showed 0.9 cm right frontal lobe mass with surrounding vasogenic edema. CT chest abdomen pelvis revealed left upper lobe mass 2.6 x 4.1 cm in posterior aspect of left upper lobe abutting major fissure. The mass extends to adjacent left hilum and laterally to left lateral pleura. 1.5 cm left lobe of liver lesion. 1.2 cm pancreatic body cystic lesion. 7/14/2023 bronchoscopy-lingular biopsy poorly differentiated adenocarcinoma, positive TTF-1, TMB 17.3, PD-L1 negative. Tier II: Variants of Potential Clinical Significance STK11 p.([id number]) PIK3CA p.([id number]) TP53 p.([id number]) Tier III: Variants of Unknown Clinical Significance ALK p.([id number]) 8/2023: [place] [facility] [place] chemotherapy cycle, Taxol/pembrolizumab only due to carboplatin shortage. Taxol was given because her labs there showed a GFR<45, excluding her from being able to receive pemetrexed. She tolerated treatment reasonably well, without any neuropathy or cytopenias. She lost he hair after first chemotherapy cycle. 9/5/2023: [place] [facility] [place]: Left upper lobe perihilar hypermetabolic mass consistent with biopsy-proven malignancy. Mildly FDG avid right thyroid nodule, correlate with thyroid ultrasound. 9/7/2023: Received cycle 1 paclitaxel/[place]embrol[place] [facility] [place] 10/10/2023: MRIB: Since 7/11/2023, right posterior frontal enhancing lesion and vasogenic edema are completely resolved as a favorable response to therapy. No new enhancing lesion.  2/6/24: CT CAP:Since July 11, 2023, significantly decreased left upper lobe lesion. No suspicious lymphadenopathy. No new disease in the chest abdomen or pelvis. Stable incidental findings above. 2/23/24: MRI HEAD: No evidence of abnormal enhancement to suggest residual/recurrent metastatic disease. Chronic lacunar infarctions. Small right temporal occipital chronic infarction. Tiny left cerebellar chronic infarction. Microvascular disease.  6/14/2024: CT CAP: Interval increase in left upper lobe pulmonary lesion. Suggestion of increase in left hepatic lesion, likely metastatic. Suggestion of increased soft tissue density and enlargement of cervix, not well visualized on this exam. New fluid collection in the subcutaneous tissues of the left gluteal region may represent an evolving hematoma from patient's recent fall in May 2024.  MRIB: Peripherally enhancing lesions are identified as described above. These findings are likely compatible with metastasis (progression of patient's underlying disease process) given patient's history. [de-identified] : Adenocarcinoma - STK11, PIK3CA, TP53, VUS in ALK, PDL1 negative [de-identified] : Medonc ([facility]): Dr.Rosa Jeffrey NeuroSx: Dr.D[facility]ico [facility]: Dr. Wernicke [FreeTextEntry1] : 9/7/2023: Taxol/pembrolizumab C1 given at [facility] [place] (no carboplatin due to national shortage) 10/26/2023: C2 carboplatin/Taxol/pembrolizumab 11/16/23 C3 carboplatin/taxol/pembro (held due to hyperglycemia, elevated lfts and hyponatremia) 11/22/23 C3 carboplatin/taxol/pembro 12/13/23 C4 held due to hyperglycemia. 1/9/2024: C4 carboplatin/Taxol/pembrolizumab 3/12/2024 C1 pembro 4/2/2024. C2 pembro 4/23/2024. C3 pembro No Show due to hyperglyemia. Pt was seen in ED 5/1/2024. C3 pembro 6/6/2024: C4 pembro (delayed due to fall and rehab placement post hospitalization) 7/11/2024: C1 gemcitabine 7/23/2024: S/p SRS to BMs 8/22/2024: C2D1 gemcitabine 8/29/2024: C2D8 gemicitabine 9/12/2024: C3D1 gemcitabine 9/19/2024: C3D8 gemcitabine [de-identified] : She has not stopped taking Eliquis because she forgot that discontinuation was discussed at last visit. She denies any falls in the past week. Reports fatigue in addition to bilateral lower extremity swelling and pain worse on the right leg.  Also reports pruritus in the legs.

## 2024-09-20 NOTE — ASSESSMENT
[FreeTextEntry1] : 73 yo F with CKD, RA, DM, HTN here for NSCLC with BMs and liver met (STK11 mutant, PIK3CA mutant, TP53 mutant, PD-L1 negative).  #NSCLC - BMs s/p SRS, liver met. Received C1 paclitaxel/pembrolizumab on 9/7/2023 at [facility] [place]. This regimen was favored over carbo/pemetrexed/pembrolizumab due to CKD and baseline GFR less than 45. She continued carboplatin/Taxol/pembrolizumab due to CKD --Treatment was delayed and held several times because of uncontrolled hyperglycemia --patient very non-compliant with food restrictions --Post 4 cycles induction chemoIO showing partial response with CT CAP 2/6/2024 (90% reduction in lung mass) and MRIB 2/23/2024 (s/p SRS, n/e of residual BMs) --June 2024 she has progression of disease in the body and brain on maintenance pembrolizumab. Given her creatinine clearance of 41 she is not a candidate for targeted therapy unless her creatinine improves. --Treatment options, ideally she should get Alimta, however given her creatinine clearance I worry about significant cytopenias, also Alimta is contraindicated in patients with creatinine clearance of less than 45 therefore planned for single agent gemcitabine with therapy, given on day 1 and day 8 of each 21-day cycle (when hyperglycemia resolved). Discussed risks, benefits and treatment schedule. Risks include but are not limited to profound cytopenias, hair loss, rashes, constipation or diarrhea, abdominal pain. Informed consent signed. Educational materials provided. --Ordered CBC, CMP, TSH today --can proceed with gemcitabine today with aloxi only as premedication; with Neulasta Onpro --RTC in 2 wks for C4D1 --patient is on intensive treatment with gemcitabine requiring monitoring for toxicity with CBC, CMP every D1 and D8 of each 21 day cycle  #anemia Hemoglobin today was 7.5, likely chemotherapy induced BM suppression -will order 1U pRBCs for transfusion, either today or tomorrow   #R calf DVT --patient reminded to stop eliquis  #Brain metastases --s/p SRS on new lesion  #CKD  --stable  #Rheumatoid arthritis --Being managed by Dr. Johnston.  --She has been taking 2mg Decadron during flares  RTC tomorrow

## 2024-09-20 NOTE — END OF VISIT
[] : Fellow [FreeTextEntry3] : Seen with Oncology Fellow, . Agree with above. --stop Eliquis due to frequent falls and resolution of distal DVT --ordered CBC, CMP, TSH --plan for CT CAP and MRIB post C3 - scheduled --on chemotherapy with gemcitabine requiring intensive monitoring for toxicity with CBC, CMP Q 3 wks

## 2024-09-20 NOTE — PHYSICAL EXAM
[Normal] : affect appropriate [de-identified] : supple [de-identified] : Bilateral calf tenderness. 2+ pitting edema of RLE to knee. RLE tender and swollen, similar to last viist. [de-identified] : 5/5 strength in UE. 5/5 strength in RLE. 4+/5 strength in LLE. [de-identified] : flaky skin on bilateral lower extremities [de-identified] : AOx3. CN grossly intact. No focal neurologic deficits. Normal speech.

## 2024-09-20 NOTE — HISTORY OF PRESENT ILLNESS
[Disease: _____________________] : Disease: [unfilled] [90: Able to carry normal activity; minor signs or symptoms of disease.] : 90: Able to carry normal activity; minor signs or symptoms of disease.  [ECOG Performance Status: 1 - Restricted in physically strenuous activity but ambulatory and able to carry out work of a light or sedentary nature] : Performance Status: 1 - Restricted in physically strenuous activity but ambulatory and able to carry out work of a light or sedentary nature, e.g., light house work, office work [de-identified] : Noemi Minor is a 74-year-old female with CKD, DM, RA, HTN who presents to the clinic for follow up of NSCLC with BMs (PDL1 negative, STK11, PIK3CA, TP53).  Onc Hx: Ex-smoker with history of CKD, baseline creatinine is around 2, diabetes, rheumatoid arthritis, HTN. She was experiencing 1 week of daily falls with lower extremity weakness without dizziness, was admitted to [facility]. MRI brain showed 0.9 cm right frontal lobe mass with surrounding vasogenic edema. CT chest abdomen pelvis revealed left upper lobe mass 2.6 x 4.1 cm in posterior aspect of left upper lobe abutting major fissure. The mass extends to adjacent left hilum and laterally to left lateral pleura. 1.5 cm left lobe of liver lesion. 1.2 cm pancreatic body cystic lesion. 7/14/2023 bronchoscopy-lingular biopsy poorly differentiated adenocarcinoma, positive TTF-1, TMB 17.3, PD-L1 negative. Tier II: Variants of Potential Clinical Significance STK11 p.([id number]) PIK3CA p.([id number]) TP53 p.([id number]) Tier III: Variants of Unknown Clinical Significance ALK p.([id number]) 8/2023: She followed up at [facility] [place] where she received her first chemotherapy cycle, Taxol/pembrolizumab only due to carboplatin shortage. Taxol was given because her labs there showed a GFR<45, excluding her from being able to receive pemetrexed. She tolerated treatment reasonably well, without any neuropathy or cytopenias. She lost he hair after first chemotherapy cycle. 9/5/2023: PET at [facility] [place]: Left upper lobe perihilar hypermetabolic mass consistent with biopsy-proven malignancy. Mildly FDG avid right thyroid nodule, correlate with thyroid ultrasound. 9/7/2023: Received cycle 1 paclitaxel/pembrolizumab at [facility] [place] (Cr 1.2 there) 10/10/2023: MRIB: Since 7/11/2023, right posterior frontal enhancing lesion and vasogenic edema are completely resolved as a favorable response to therapy. No new enhancing lesion.  2/6/24: CT CAP:Since July 11, 2023, significantly decreased left upper lobe lesion. No suspicious lymphadenopathy. No new disease in the chest abdomen or pelvis. Stable incidental findings above. 2/23/24: MRI HEAD: No evidence of abnormal enhancement to suggest residual/recurrent metastatic disease. Chronic lacunar infarctions. Small right temporal occipital chronic infarction. Tiny left cerebellar chronic infarction. Microvascular disease.  6/14/2024: CT CAP: Interval increase in left upper lobe pulmonary lesion. Suggestion of increase in left hepatic lesion, likely metastatic. Suggestion of increased soft tissue density and enlargement of cervix, not well visualized on this exam. New fluid collection in the subcutaneous tissues of the left gluteal region may represent an evolving hematoma from patient's recent fall in May 2024.  MRIB: Peripherally enhancing lesions are identified as described above. These findings are likely compatible with metastasis (progression of patient's underlying disease process) given patient's history. [de-identified] : Adenocarcinoma - STK11, PIK3CA, TP53, VUS in ALK, PDL1 negative [de-identified] : [place] [place].Rosa Jeffrey NeuroSx: Dr.D'Amico [facility]: Dr. Wernicke [FreeTextEntry1] : 9/7/2023: Taxol/pembrolizumab C1 [place] [facility] [place] (no carboplatin due to national shortage) 10/26/2023: C2 carboplatin/Taxol/pembrolizumab 11/16/23 C3 carboplatin/taxol/pembro (held due to hyperglycemia, elevated lfts and hyponatremia) 11/22/23 C3 carboplatin/taxol/pembro 12/13/23 C4 held due to hyperglycemia. 1/9/2024: C4 carboplatin/Taxol/pembrolizumab 3/12/2024 C1 pembro 4/2/2024. C2 pembro 4/23/2024. C3 pembro No Show due to hyperglyemia. Pt was seen in ED 5/1/2024. C3 pembro 6/6/2024: C4 pembro (delayed due to fall and rehab placement post hospitalization) 7/11/2024: C1 gemcitabine 7/23/2024: S/p SRS to BMs 8/22/2024: C2D1 gemcitabine 8/29/2024: C2D8 gemicitabine 9/12/2024: C3D1 gemcitabine 9/19/2024: C3D8 gemcitabine [de-identified] : She has not stopped taking Eliquis because she forgot that discontinuation was discussed at last visit. She denies any falls in the past week. Reports fatigue in addition to bilateral lower extremity swelling and pain worse on the right leg.  Also reports pruritus in the legs.

## 2024-09-24 ENCOUNTER — NON-APPOINTMENT (OUTPATIENT)
Age: 74
End: 2024-09-24

## 2024-09-26 ENCOUNTER — APPOINTMENT (OUTPATIENT)
Dept: VASCULAR SURGERY | Facility: CLINIC | Age: 74
End: 2024-09-26
Payer: MEDICARE

## 2024-09-26 ENCOUNTER — APPOINTMENT (OUTPATIENT)
Dept: RADIATION ONCOLOGY | Facility: CLINIC | Age: 74
End: 2024-09-26
Payer: MEDICARE

## 2024-09-26 VITALS
RESPIRATION RATE: 18 BRPM | BODY MASS INDEX: 25.73 KG/M2 | WEIGHT: 145.25 LBS | SYSTOLIC BLOOD PRESSURE: 114 MMHG | TEMPERATURE: 97.7 F | HEART RATE: 65 BPM | OXYGEN SATURATION: 98 % | HEIGHT: 63 IN | DIASTOLIC BLOOD PRESSURE: 67 MMHG

## 2024-09-26 PROCEDURE — 93970 EXTREMITY STUDY: CPT

## 2024-09-26 PROCEDURE — 99024 POSTOP FOLLOW-UP VISIT: CPT

## 2024-09-26 NOTE — VITALS
[Maximal Pain Intensity: 2/10] : 2/10 [Least Pain Intensity: 0/10] : 0/10 [Pain Location: ___] : Pain Location: [unfilled] [NoTreatment Scheduled] : no treatment scheduled [Pain Interferes with ADLs] : Pain does not interfere with activities of daily living

## 2024-09-26 NOTE — HISTORY OF PRESENT ILLNESS
[FreeTextEntry1] : 75 y/o female, former smoker, with PMHx of CKD, DM, RA, HTN, metastatic NSCLC who completed RIGHT brain RT of 2700cGy in 3Fx from 7/20/2023 - 8/15/2023. Her recent MRI showed two new peripherally enhancing lesions consistent with new brain metastases. She now presents for further radiation to the brain. She is referred by Dr. Delaney due to recent brain MRI with peripherally enhancing lesions c/w brain metastasis. Peripherally enhancing lesion right parietal cortex 1.0 x 0.9 cm & left parietal cortex measures approximately 0.7 cm.   She received 4 cycles of carboplatin/Taxol/pembrolizumab from 9/7/23 - 1/9/2024. She then received 3 cycles of pembro from 3/12/2024 - 5/1/2024 (6/6/24 cycle 4 delayed due to fall and rehab placement post hospitalization).   She completed a further 2700cGy in 3Fx on 8/1/2024.  9/19/2024: C3D8 gemcitabine.       06/14/2024 - MRI BRAIN  This exam is compared prior contrast enhanced brain MRI performed on February 23, 2024 Abnormal lesions involving the posterior fossa and supratentorial region are seen.   Stable tiny nonenhancing cystic area is seen involving the left cerebellar region.   Parenchymal volume loss and chronic microvessel ischemic changes again seen.   Peripherally enhancing lesion is seen involving the right parietal cortex with surrounding edema. This is best seen on series 802 image 43 measures approximately 1.0 x 0.9 cm.   Peripherally enhancing lesion seen involving the left parietal cortex with surrounding edema. This best seen on series 2 image 48 and measures approximately 0.7 cm.   No acute hemorrhage seen   No significant shift or herniation.   Evaluation of the diffusion weighted sequence demonstrates no abnormal areas of restricted diffusion to suggest acute infarct.   The large vessels demonstrate normal flow voids.   The visualized paranasal sinuses mastoid and middle ear regions appear clear.   Enlargement of both globes are identified. Please correlate clinically.    IMPRESSION:   Peripherally enhancing lesions are identified as described above. These findings are likely compatible with metastasis (progression of patient's underlying disease process) given patient's history.     06/14/2024 CT CHEST  FINDINGS: CHEST: LUNGS AND LARGE AIRWAYS: Patent central airways. Interval increase in left upper lobe lesion abutting the major fissure (5, 123), now measuring 3.2 x 1.7 cm, previously 1.8 x 0.5 cm.  ABDOMEN AND PELVIS: LIVER: Left hepatic lesion appears increased now measuring 2.7 x 3.0 cm (3, 53), previously 2.5 x 1.8 cm, when remeasured.  KIDNEYS/URETERS: Bilateral cysts and multiple low-attenuation lesions which are too small to further characterize on this exam..   REPRODUCTIVE ORGANS: Enlarged uterus with multiple calcified fibroids. Suggestion of increased soft tissue density/enlargement of cervix.    IMPRESSION:   Interval increase in left upper lobe pulmonary lesion.   Suggestion of increase in left hepatic lesion, likely metastatic.   Suggestion of increased soft tissue density and enlargement of cervix, not well visualized on this exam.   No fluid collection in the subcutaneous tissues of the left gluteal region may represent an evolving hematoma from patient's recent fall in May 2024.  9/26/2024 - Follow Up Patient states she is feeling "very well." No change in weight. Appetite is good. Denies sleep disturbances or excessive fatigue. Blurry vision which occurred during treatment has resolved. C/O RLE pain and swelling is noted. U/S B/L LE ordered for R/O DVT, to be done today. She is followed by Dr Dleaney, last seen September 2024. Next MR head and CT chest scheduled for early October 2024. Next appointment with Dr. Delaney and next cycle of chemotherapy October 2024 MARIBELL Vee and will see her on Oct 2nd 2024  ________________________________________ ONCOLOGIC HISTORY: 73F with PMHx of HTN HLD, DM, R hip replacement, RA, CKD (Cr baseline ~2) presents to the  ED for unsteadiness and dizziness x1 week, underwent stroke workup with imaging findings negative for acute CVA however with incidental finding of 0.9 cm right frontal lobe brain mass suspicious for metastasis.  Body imaging showed a 2.6 x 4.1 cm somewhat spiculated mass in the EM lung.  8/15/23 - OTV - Ms. Minor has completed 27 Gy / 27 Gy to the right brain.  She feels well.  No complaints.  Return for PTE.

## 2024-09-26 NOTE — HISTORY OF PRESENT ILLNESS
[FreeTextEntry1] : 73 y/o female, former smoker, with PMHx of CKD, DM, RA, HTN, metastatic NSCLC who completed RIGHT brain RT of 2700cGy in 3Fx from 7/20/2023 - 8/15/2023. Her recent MRI showed two new peripherally enhancing lesions consistent with new brain metastases. She now presents for further radiation to the brain. She is referred by Dr. Delaney due to recent brain MRI with peripherally enhancing lesions c/w brain metastasis. Peripherally enhancing lesion right parietal cortex 1.0 x 0.9 cm & left parietal cortex measures approximately 0.7 cm.   She received 4 cycles of carboplatin/Taxol/pembrolizumab from 9/7/23 - 1/9/2024. She then received 3 cycles of pembro from 3/12/2024 - 5/1/2024 (6/6/24 cycle 4 delayed due to fall and rehab placement post hospitalization).   She completed a further 2700cGy in 3Fx on 8/1/2024.  9/19/2024: C3D8 gemcitabine.       06/14/2024 - MRI BRAIN  This exam is compared prior contrast enhanced brain MRI performed on February 23, 2024 Abnormal lesions involving the posterior fossa and supratentorial region are seen.   Stable tiny nonenhancing cystic area is seen involving the left cerebellar region.   Parenchymal volume loss and chronic microvessel ischemic changes again seen.   Peripherally enhancing lesion is seen involving the right parietal cortex with surrounding edema. This is best seen on series 802 image 43 measures approximately 1.0 x 0.9 cm.   Peripherally enhancing lesion seen involving the left parietal cortex with surrounding edema. This best seen on series 2 image 48 and measures approximately 0.7 cm.   No acute hemorrhage seen   No significant shift or herniation.   Evaluation of the diffusion weighted sequence demonstrates no abnormal areas of restricted diffusion to suggest acute infarct.   The large vessels demonstrate normal flow voids.   The visualized paranasal sinuses mastoid and middle ear regions appear clear.   Enlargement of both globes are identified. Please correlate clinically.    IMPRESSION:   Peripherally enhancing lesions are identified as described above. These findings are likely compatible with metastasis (progression of patient's underlying disease process) given patient's history.     06/14/2024 CT CHEST  FINDINGS: CHEST: LUNGS AND LARGE AIRWAYS: Patent central airways. Interval increase in left upper lobe lesion abutting the major fissure (5, 123), now measuring 3.2 x 1.7 cm, previously 1.8 x 0.5 cm.  ABDOMEN AND PELVIS: LIVER: Left hepatic lesion appears increased now measuring 2.7 x 3.0 cm (3, 53), previously 2.5 x 1.8 cm, when remeasured.  KIDNEYS/URETERS: Bilateral cysts and multiple low-attenuation lesions which are too small to further characterize on this exam..   REPRODUCTIVE ORGANS: Enlarged uterus with multiple calcified fibroids. Suggestion of increased soft tissue density/enlargement of cervix.    IMPRESSION:   Interval increase in left upper lobe pulmonary lesion.   Suggestion of increase in left hepatic lesion, likely metastatic.   Suggestion of increased soft tissue density and enlargement of cervix, not well visualized on this exam.   No fluid collection in the subcutaneous tissues of the left gluteal region may represent an evolving hematoma from patient's recent fall in May 2024.  9/26/2024 - Follow Up Patient states she is feeling "very well." No change in weight. Appetite is good. Denies sleep disturbances or excessive fatigue. Blurry vision which occurred during treatment has resolved. C/O RLE pain and swelling is noted. U/S B/L LE ordered for R/O DVT, to be done today. She is followed by Dr Delaney, last seen September 2024. Next MR head and CT chest scheduled for early October 2024. Next appointment with Dr. Delaney and next cycle of chemotherapy October 2024 MARIBELL Vee and will see her on Oct 2nd 2024  ________________________________________ ONCOLOGIC HISTORY: 73F with PMHx of HTN HLD, DM, R hip replacement, RA, CKD (Cr baseline ~2) presents to the  ED for unsteadiness and dizziness x1 week, underwent stroke workup with imaging findings negative for acute CVA however with incidental finding of 0.9 cm right frontal lobe brain mass suspicious for metastasis.  Body imaging showed a 2.6 x 4.1 cm somewhat spiculated mass in the EM lung.  8/15/23 - OTV - Ms. Minor has completed 27 Gy / 27 Gy to the right brain.  She feels well.  No complaints.  Return for PTE.

## 2024-09-26 NOTE — REASON FOR VISIT
[Routine Follow-Up] : routine follow-up visit for [Brain Metastasis] : brain metastasis [Post-Treatment Evaluation] : post-treatment evaluation for

## 2024-09-26 NOTE — REVIEW OF SYSTEMS
[Lower Ext Edema] : lower extremity edema [Negative] : Allergic/Immunologic [Dysphagia: Grade 0] : Dysphagia: Grade 0 [Nausea: Grade 0] : Nausea: Grade 0 [Vomiting: Grade 0] : Vomiting: Grade 0 [Fatigue: Grade 0] : Fatigue: Grade 0 [Tinnitus - Grade 0] : Tinnitus - Grade 0 [Blurred Vision: Grade 0] : Blurred Vision: Grade 0 [Mucositis Oral: Grade 0] : Mucositis Oral: Grade 0  [Xerostomia: Grade 0] : Xerostomia: Grade 0 [Oral Pain: Grade 0] : Oral Pain: Grade 0 [Salivary duct inflammation: Grade 0] : Salivary duct inflammation: Grade 0 [Dysgeusia: Grade 0] : Dysgeusia: Grade 0 [Dizziness: Grade 0] : Dizziness: Grade 0  [Lethargy: Grade 0] : Lethargy: Grade 0 [Anxiety: Grade 0] : Anxiety: Grade 0  [Depression: Grade 0] : Depression: Grade 0 [FreeTextEntry5] : reports swelling and pain to B/L LE

## 2024-09-29 NOTE — ED PROVIDER NOTE - WR ORDER DATE AND TIME 4
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   [place] [facility] [place] of his left ankle. Of note he has been having right shoulder to his right hand pain, associated with mild swelling to his right hand.  This was atraumatic however he has been reporting that he has been carrying heavy lunch boxes on his right shoulder at work.  Since the event he has been compensating on his right  leg with now associated right knee mild pain nonradiating. Denies any chest pain, abdominal pain, shortness of breath, nausea/vomiting, headaches, fevers, chills,   weakness, syncope,   urinary symptoms, subjective neurological deficits.   History obtained from independent historian: N/A  External note reviewed: N/A  DDx includes but is not limited to:  rule out traumatic injuries, possible rheumatological etiology  Decision making, ED course, independent interpretation of imaging studies, and consults:   -   CBC, CMP,  will obtain x-rays of the left ankle right hand and right knee.  And also CT abdomen pelvis with IV contrast to examine the lumbar spine and bilateral lower back.  -   Morphine and Toradol for pain control.  -   On reevaluation the patient is with improved pain control at 7:19 PM.    Consideration hospitalization vs de-escalation of care: likely dc with follow up ortho and rheum.  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   [place] [facility] [place] a [place]-ray of his left ankle. Of note he has been having right shoulder to his right hand pain, associated with mild swelling to his right hand.  This was atraumatic however he has been reporting that he has been carrying heavy lunch boxes on his right shoulder at work.  Since the event he has been compensating on his right  leg with now associated right knee mild pain nonradiating. Denies any chest pain, abdominal pain, shortness of breath, nausea/vomiting, headaches, fevers, chills,   weakness, syncope,   urinary symptoms, subjective neurological deficits.   History obtained from independent historian: N/A  External note reviewed: N/A  DDx includes but is not limited to:  rule out traumatic injuries, possible rheumatological etiology  Decision making, ED course, independent interpretation of imaging studies, and consults:   -   CBC, CMP,  will obtain x-rays of the left ankle right hand and right knee.  And also CT abdomen pelvis with IV contrast to examine the lumbar spine and bilateral lower back.  -   Morphine and Toradol for pain control.  -   On reevaluation the patient is with improved pain control at 7:19 PM. well-appearing, x-rays were reviewed and are negative for any fractures, CT abdomen pelvis is negative for any acute pathology.  Advised the patient to follow-up with orthopedics and rheumatology for further evaluation  If he has persistent symptoms.  Will prescribe oxycodone for pain control as needed for breakthrough pain.    Consideration hospitalization vs de-escalation of care: likely dc with follow up ortho and rheum.  Disposition: dc 23-Feb-2022 16:38

## 2024-10-02 ENCOUNTER — APPOINTMENT (OUTPATIENT)
Dept: INFUSION THERAPY | Facility: CLINIC | Age: 74
End: 2024-10-02

## 2024-10-03 ENCOUNTER — OUTPATIENT (OUTPATIENT)
Dept: OUTPATIENT SERVICES | Facility: HOSPITAL | Age: 74
LOS: 1 days | End: 2024-10-03
Payer: MEDICARE

## 2024-10-03 ENCOUNTER — APPOINTMENT (OUTPATIENT)
Dept: MRI IMAGING | Facility: HOSPITAL | Age: 74
End: 2024-10-03

## 2024-10-03 ENCOUNTER — NON-APPOINTMENT (OUTPATIENT)
Age: 74
End: 2024-10-03

## 2024-10-03 ENCOUNTER — APPOINTMENT (OUTPATIENT)
Dept: CT IMAGING | Facility: HOSPITAL | Age: 74
End: 2024-10-03

## 2024-10-03 DIAGNOSIS — Z96.641 PRESENCE OF RIGHT ARTIFICIAL HIP JOINT: Chronic | ICD-10-CM

## 2024-10-03 LAB — POCT ISTAT CREATININE: 2.3 MG/DL — HIGH (ref 0.5–1.3)

## 2024-10-03 PROCEDURE — 82565 ASSAY OF CREATININE: CPT

## 2024-10-03 PROCEDURE — A9585: CPT

## 2024-10-03 PROCEDURE — 70553 MRI BRAIN STEM W/O & W/DYE: CPT

## 2024-10-03 PROCEDURE — 70553 MRI BRAIN STEM W/O & W/DYE: CPT | Mod: 26

## 2024-10-04 ENCOUNTER — NON-APPOINTMENT (OUTPATIENT)
Age: 74
End: 2024-10-04

## 2024-10-07 ENCOUNTER — APPOINTMENT (OUTPATIENT)
Dept: HEMATOLOGY ONCOLOGY | Facility: CLINIC | Age: 74
End: 2024-10-07

## 2024-10-07 DIAGNOSIS — I87.2 VENOUS INSUFFICIENCY (CHRONIC) (PERIPHERAL): ICD-10-CM

## 2024-10-07 LAB
ALBUMIN SERPL ELPH-MCNC: 2.8 G/DL
ALP BLD-CCNC: 138 U/L
ALT SERPL-CCNC: 8 U/L
ANION GAP SERPL CALC-SCNC: 9 MMOL/L
AST SERPL-CCNC: 25 U/L
BILIRUB SERPL-MCNC: 0.5 MG/DL
BUN SERPL-MCNC: 26 MG/DL
CALCIUM SERPL-MCNC: 9.5 MG/DL
CHLORIDE SERPL-SCNC: 104 MMOL/L
CO2 SERPL-SCNC: 30 MMOL/L
CREAT SERPL-MCNC: 1.4 MG/DL
EGFR: 39 ML/MIN/1.73M2
GLUCOSE SERPL-MCNC: 372 MG/DL
HCT VFR BLD CALC: 32.6 %
HGB BLD-MCNC: 10.1 G/DL
LYMPHOCYTES # BLD AUTO: 1.4 K/UL
LYMPHOCYTES NFR BLD AUTO: 10 %
MAN DIFF?: NO
MCHC RBC-ENTMCNC: 29.4 PG
MCHC RBC-ENTMCNC: 31 GM/DL
MCV RBC AUTO: 95 FL
NEUTROPHILS # BLD AUTO: 10.7 K/UL
NEUTROPHILS NFR BLD AUTO: 76.9 %
PLATELET # BLD AUTO: 351 K/UL
POTASSIUM SERPL-SCNC: 4.8 MMOL/L
PROT SERPL-MCNC: 7.1 G/DL
RBC # BLD: 3.43 M/UL
RBC # FLD: 21.6 %
SODIUM SERPL-SCNC: 143 MMOL/L
WBC # FLD AUTO: 13.9 K/UL

## 2024-10-07 PROCEDURE — 99215 OFFICE O/P EST HI 40 MIN: CPT

## 2024-10-07 PROCEDURE — G2211 COMPLEX E/M VISIT ADD ON: CPT

## 2024-10-09 ENCOUNTER — APPOINTMENT (OUTPATIENT)
Dept: INFUSION THERAPY | Facility: CLINIC | Age: 74
End: 2024-10-09

## 2024-10-14 ENCOUNTER — APPOINTMENT (OUTPATIENT)
Dept: NUCLEAR MEDICINE | Facility: HOSPITAL | Age: 74
End: 2024-10-14

## 2024-10-14 ENCOUNTER — OUTPATIENT (OUTPATIENT)
Dept: OUTPATIENT SERVICES | Facility: HOSPITAL | Age: 74
LOS: 1 days | End: 2024-10-14
Payer: MEDICARE

## 2024-10-14 DIAGNOSIS — Z96.641 PRESENCE OF RIGHT ARTIFICIAL HIP JOINT: Chronic | ICD-10-CM

## 2024-10-14 LAB — GLUCOSE BLDC GLUCOMTR-MCNC: 286 MG/DL — HIGH (ref 70–99)

## 2024-10-14 PROCEDURE — 78815 PET IMAGE W/CT SKULL-THIGH: CPT | Mod: 26,PS

## 2024-10-14 PROCEDURE — 78815 PET IMAGE W/CT SKULL-THIGH: CPT

## 2024-10-14 PROCEDURE — A9552: CPT

## 2024-10-14 PROCEDURE — 82962 GLUCOSE BLOOD TEST: CPT

## 2024-10-15 ENCOUNTER — APPOINTMENT (OUTPATIENT)
Dept: HOME HEALTH SERVICES | Facility: HOME HEALTH | Age: 74
End: 2024-10-15
Payer: MEDICARE

## 2024-10-15 VITALS
BODY MASS INDEX: 25.69 KG/M2 | TEMPERATURE: 98.2 F | SYSTOLIC BLOOD PRESSURE: 142 MMHG | HEART RATE: 72 BPM | DIASTOLIC BLOOD PRESSURE: 86 MMHG | OXYGEN SATURATION: 96 % | RESPIRATION RATE: 20 BRPM | WEIGHT: 145 LBS | HEIGHT: 63 IN

## 2024-10-15 DIAGNOSIS — Z86.79 PERSONAL HISTORY OF OTHER DISEASES OF THE CIRCULATORY SYSTEM: ICD-10-CM

## 2024-10-15 DIAGNOSIS — Z96.649 DISLOCATION OF OTHER INTERNAL JOINT PROSTHESIS, INITIAL ENCOUNTER: ICD-10-CM

## 2024-10-15 DIAGNOSIS — R39.81 FUNCTIONAL URINARY INCONTINENCE: ICD-10-CM

## 2024-10-15 DIAGNOSIS — T84.028A DISLOCATION OF OTHER INTERNAL JOINT PROSTHESIS, INITIAL ENCOUNTER: ICD-10-CM

## 2024-10-15 DIAGNOSIS — R41.89 OTHER SYMPTOMS AND SIGNS INVOLVING COGNITIVE FUNCTIONS AND AWARENESS: ICD-10-CM

## 2024-10-15 DIAGNOSIS — R60.0 LOCALIZED EDEMA: ICD-10-CM

## 2024-10-15 DIAGNOSIS — Z23 ENCOUNTER FOR IMMUNIZATION: ICD-10-CM

## 2024-10-15 DIAGNOSIS — S02.401A MAXILLARY FRACTURE, UNSPECIFIED SIDE, INITIAL ENCOUNTER FOR CLOSED FRACTURE: ICD-10-CM

## 2024-10-15 DIAGNOSIS — Z65.8 OTHER SPECIFIED PROBLEMS RELATED TO PSYCHOSOCIAL CIRCUMSTANCES: ICD-10-CM

## 2024-10-15 DIAGNOSIS — N18.4 CHRONIC KIDNEY DISEASE, STAGE 4 (SEVERE): ICD-10-CM

## 2024-10-15 DIAGNOSIS — M06.9 RHEUMATOID ARTHRITIS, UNSPECIFIED: ICD-10-CM

## 2024-10-15 PROCEDURE — 99349 HOME/RES VST EST MOD MDM 40: CPT

## 2024-10-18 ENCOUNTER — APPOINTMENT (OUTPATIENT)
Dept: ENDOCRINOLOGY | Facility: CLINIC | Age: 74
End: 2024-10-18

## 2024-10-18 VITALS
WEIGHT: 142 LBS | DIASTOLIC BLOOD PRESSURE: 70 MMHG | HEART RATE: 105 BPM | SYSTOLIC BLOOD PRESSURE: 129 MMHG | BODY MASS INDEX: 25.15 KG/M2

## 2024-10-18 LAB
GLUCOSE BLDC GLUCOMTR-MCNC: 215
HBA1C MFR BLD HPLC: 7.9

## 2024-10-18 PROCEDURE — 99214 OFFICE O/P EST MOD 30 MIN: CPT | Mod: 25

## 2024-10-18 PROCEDURE — 82962 GLUCOSE BLOOD TEST: CPT

## 2024-10-18 PROCEDURE — 83036 HEMOGLOBIN GLYCOSYLATED A1C: CPT | Mod: QW

## 2024-10-21 ENCOUNTER — EMERGENCY (EMERGENCY)
Facility: HOSPITAL | Age: 74
LOS: 1 days | Discharge: HOME | End: 2024-10-21
Attending: EMERGENCY MEDICINE | Admitting: EMERGENCY MEDICINE
Payer: MEDICARE

## 2024-10-21 ENCOUNTER — APPOINTMENT (OUTPATIENT)
Dept: HEMATOLOGY ONCOLOGY | Facility: CLINIC | Age: 74
End: 2024-10-21

## 2024-10-21 VITALS
HEART RATE: 96 BPM | OXYGEN SATURATION: 95 % | DIASTOLIC BLOOD PRESSURE: 79 MMHG | RESPIRATION RATE: 18 BRPM | SYSTOLIC BLOOD PRESSURE: 169 MMHG | TEMPERATURE: 97 F

## 2024-10-21 VITALS
HEIGHT: 63 IN | SYSTOLIC BLOOD PRESSURE: 107 MMHG | RESPIRATION RATE: 18 BRPM | HEART RATE: 73 BPM | TEMPERATURE: 98 F | WEIGHT: 160.06 LBS | DIASTOLIC BLOOD PRESSURE: 65 MMHG | OXYGEN SATURATION: 96 %

## 2024-10-21 DIAGNOSIS — Z96.641 PRESENCE OF RIGHT ARTIFICIAL HIP JOINT: Chronic | ICD-10-CM

## 2024-10-21 LAB
ADD ON TEST-SPECIMEN IN LAB: SIGNIFICANT CHANGE UP
ANION GAP SERPL CALC-SCNC: 10 MMOL/L — SIGNIFICANT CHANGE UP (ref 5–17)
ANION GAP SERPL CALC-SCNC: 11 MMOL/L — SIGNIFICANT CHANGE UP (ref 5–17)
APPEARANCE UR: ABNORMAL
BACTERIA # UR AUTO: ABNORMAL /HPF
BASE EXCESS BLDV CALC-SCNC: 3 MMOL/L — SIGNIFICANT CHANGE UP (ref -2–3)
BILIRUB UR-MCNC: NEGATIVE — SIGNIFICANT CHANGE UP
BUN SERPL-MCNC: 34 MG/DL — HIGH (ref 7–23)
BUN SERPL-MCNC: 35 MG/DL — HIGH (ref 7–23)
CA-I SERPL-SCNC: 1.21 MMOL/L — SIGNIFICANT CHANGE UP (ref 1.15–1.33)
CALCIUM SERPL-MCNC: 9.2 MG/DL — SIGNIFICANT CHANGE UP (ref 8.4–10.5)
CALCIUM SERPL-MCNC: 9.8 MG/DL — SIGNIFICANT CHANGE UP (ref 8.4–10.5)
CHLORIDE SERPL-SCNC: 102 MMOL/L — SIGNIFICANT CHANGE UP (ref 96–108)
CHLORIDE SERPL-SCNC: 104 MMOL/L — SIGNIFICANT CHANGE UP (ref 96–108)
CO2 BLDV-SCNC: 30 MMOL/L — HIGH (ref 22–26)
CO2 SERPL-SCNC: 26 MMOL/L — SIGNIFICANT CHANGE UP (ref 22–31)
CO2 SERPL-SCNC: 26 MMOL/L — SIGNIFICANT CHANGE UP (ref 22–31)
COLOR SPEC: YELLOW — SIGNIFICANT CHANGE UP
CREAT SERPL-MCNC: 1.84 MG/DL — HIGH (ref 0.5–1.3)
CREAT SERPL-MCNC: 1.92 MG/DL — HIGH (ref 0.5–1.3)
DIFF PNL FLD: ABNORMAL
EGFR: 27 ML/MIN/1.73M2 — LOW
EGFR: 28 ML/MIN/1.73M2 — LOW
GAS PNL BLDV: 139 MMOL/L — SIGNIFICANT CHANGE UP (ref 136–145)
GAS PNL BLDV: SIGNIFICANT CHANGE UP
GLUCOSE SERPL-MCNC: 214 MG/DL — HIGH (ref 70–99)
GLUCOSE SERPL-MCNC: 252 MG/DL — HIGH (ref 70–99)
GLUCOSE UR QL: 500 MG/DL
HCO3 BLDV-SCNC: 28 MMOL/L — SIGNIFICANT CHANGE UP (ref 22–29)
HCT VFR BLD CALC: 35.6 % — SIGNIFICANT CHANGE UP (ref 34.5–45)
HGB BLD-MCNC: 11 G/DL — LOW (ref 11.5–15.5)
KETONES UR-MCNC: NEGATIVE MG/DL — SIGNIFICANT CHANGE UP
LEUKOCYTE ESTERASE UR-ACNC: ABNORMAL
MCHC RBC-ENTMCNC: 29.3 PG — SIGNIFICANT CHANGE UP (ref 27–34)
MCHC RBC-ENTMCNC: 30.9 GM/DL — LOW (ref 32–36)
MCV RBC AUTO: 94.7 FL — SIGNIFICANT CHANGE UP (ref 80–100)
NITRITE UR-MCNC: NEGATIVE — SIGNIFICANT CHANGE UP
NRBC # BLD: 0 /100 WBCS — SIGNIFICANT CHANGE UP (ref 0–0)
PCO2 BLDV: 46 MMHG — HIGH (ref 39–42)
PH BLDV: 7.4 — SIGNIFICANT CHANGE UP (ref 7.32–7.43)
PH UR: 6.5 — SIGNIFICANT CHANGE UP (ref 5–8)
PLATELET # BLD AUTO: 227 K/UL — SIGNIFICANT CHANGE UP (ref 150–400)
PO2 BLDV: 45 MMHG — SIGNIFICANT CHANGE UP (ref 25–45)
POTASSIUM BLDV-SCNC: 3.9 MMOL/L — SIGNIFICANT CHANGE UP (ref 3.5–5.1)
POTASSIUM SERPL-MCNC: 3.7 MMOL/L — SIGNIFICANT CHANGE UP (ref 3.5–5.3)
POTASSIUM SERPL-MCNC: 4.1 MMOL/L — SIGNIFICANT CHANGE UP (ref 3.5–5.3)
POTASSIUM SERPL-SCNC: 3.7 MMOL/L — SIGNIFICANT CHANGE UP (ref 3.5–5.3)
POTASSIUM SERPL-SCNC: 4.1 MMOL/L — SIGNIFICANT CHANGE UP (ref 3.5–5.3)
PROT UR-MCNC: 300 MG/DL
RBC # BLD: 3.76 M/UL — LOW (ref 3.8–5.2)
RBC # FLD: 17.8 % — HIGH (ref 10.3–14.5)
RBC CASTS # UR COMP ASSIST: 57 /HPF — HIGH (ref 0–4)
SAO2 % BLDV: 78.9 % — SIGNIFICANT CHANGE UP (ref 67–88)
SODIUM SERPL-SCNC: 139 MMOL/L — SIGNIFICANT CHANGE UP (ref 135–145)
SODIUM SERPL-SCNC: 140 MMOL/L — SIGNIFICANT CHANGE UP (ref 135–145)
SP GR SPEC: 1.02 — SIGNIFICANT CHANGE UP (ref 1–1.03)
SQUAMOUS # UR AUTO: 5 /HPF — SIGNIFICANT CHANGE UP (ref 0–5)
TROPONIN T, HIGH SENSITIVITY RESULT: 45 NG/L — SIGNIFICANT CHANGE UP (ref 0–51)
TROPONIN T, HIGH SENSITIVITY RESULT: 71 NG/L — CRITICAL HIGH (ref 0–51)
UROBILINOGEN FLD QL: 0.2 MG/DL — SIGNIFICANT CHANGE UP (ref 0.2–1)
WBC # BLD: 10.76 K/UL — HIGH (ref 3.8–10.5)
WBC # FLD AUTO: 10.76 K/UL — HIGH (ref 3.8–10.5)
WBC UR QL: 459 /HPF — HIGH (ref 0–5)
YEAST-LIKE CELLS: PRESENT

## 2024-10-21 PROCEDURE — 82330 ASSAY OF CALCIUM: CPT

## 2024-10-21 PROCEDURE — 84295 ASSAY OF SERUM SODIUM: CPT

## 2024-10-21 PROCEDURE — 84484 ASSAY OF TROPONIN QUANT: CPT

## 2024-10-21 PROCEDURE — 84132 ASSAY OF SERUM POTASSIUM: CPT

## 2024-10-21 PROCEDURE — 93005 ELECTROCARDIOGRAM TRACING: CPT

## 2024-10-21 PROCEDURE — 85027 COMPLETE CBC AUTOMATED: CPT

## 2024-10-21 PROCEDURE — 80048 BASIC METABOLIC PNL TOTAL CA: CPT

## 2024-10-21 PROCEDURE — 82962 GLUCOSE BLOOD TEST: CPT

## 2024-10-21 PROCEDURE — 81001 URINALYSIS AUTO W/SCOPE: CPT

## 2024-10-21 PROCEDURE — 99284 EMERGENCY DEPT VISIT MOD MDM: CPT | Mod: 25

## 2024-10-21 PROCEDURE — 82803 BLOOD GASES ANY COMBINATION: CPT

## 2024-10-21 PROCEDURE — 96374 THER/PROPH/DIAG INJ IV PUSH: CPT

## 2024-10-21 PROCEDURE — 87086 URINE CULTURE/COLONY COUNT: CPT

## 2024-10-21 PROCEDURE — 99284 EMERGENCY DEPT VISIT MOD MDM: CPT

## 2024-10-21 PROCEDURE — 87186 SC STD MICRODIL/AGAR DIL: CPT

## 2024-10-21 PROCEDURE — 36415 COLL VENOUS BLD VENIPUNCTURE: CPT

## 2024-10-21 PROCEDURE — 93010 ELECTROCARDIOGRAM REPORT: CPT

## 2024-10-21 PROCEDURE — 87077 CULTURE AEROBIC IDENTIFY: CPT

## 2024-10-21 RX ORDER — CEFPODOXIME PROXETIL 50 MG/5 ML
1 SUSPENSION, RECONSTITUTED, ORAL (ML) ORAL
Qty: 14 | Refills: 0
Start: 2024-10-21 | End: 2024-10-27

## 2024-10-21 RX ORDER — CEFTRIAXONE SODIUM 1 G
1000 VIAL (EA) INJECTION ONCE
Refills: 0 | Status: COMPLETED | OUTPATIENT
Start: 2024-10-21 | End: 2024-10-21

## 2024-10-21 RX ORDER — SODIUM CHLORIDE 0.9 % (FLUSH) 0.9 %
1000 SYRINGE (ML) INJECTION ONCE
Refills: 0 | Status: COMPLETED | OUTPATIENT
Start: 2024-10-21 | End: 2024-10-21

## 2024-10-21 RX ADMIN — Medication 100 MILLIGRAM(S): at 20:30

## 2024-10-21 RX ADMIN — Medication 40 MILLIEQUIVALENT(S): at 21:24

## 2024-10-21 RX ADMIN — Medication 1000 MILLILITER(S): at 16:55

## 2024-10-21 NOTE — ED ADULT NURSE NOTE - NSICDXPASTMEDICALHX_GEN_ALL_CORE_FT
PAST MEDICAL HISTORY:  Degenerative joint disease (DJD) of lumbar spine     DM (diabetes mellitus), type 2     HLD (hyperlipidemia)     HTN (hypertension)     Lung cancer metastatic to brain     Rheumatoid arthritis     Stage 3 chronic kidney disease

## 2024-10-21 NOTE — ED PROVIDER NOTE - NSFOLLOWUPINSTRUCTIONS_ED_ALL_ED_FT
Hypoglycemia  UTI    Take the antibiotic prescribed.  Return for increased pain, flank pain, nausea/vomiting, fever, any other concerns.     Check your sugar before giving yourself insulin.  Do not give insulin if your sugar is low.      Follow up with your pmd and endocrinologist.     Urinary Tract Infection    A urinary tract infection (UTI) is an infection of any part of the urinary tract, which includes the kidneys, ureters, bladder, and urethra. Risk factors include ignoring your need to urinate, wiping back to front if female, being an uncircumcised male, and having diabetes or a weak immune system. Symptoms include frequent urination, pain or burning with urination, foul smelling urine, cloudy urine, pain in the lower abdomen, blood in the urine, and fever. If you were prescribed an antibiotic medicine, take it as told by your health care provider. Do not stop taking the antibiotic even if you start to feel better.    SEEK IMMEDIATE MEDICAL CARE IF YOU HAVE ANY OF THE FOLLOWING SYMPTOMS: severe back or abdominal pain, fever, inability to keep fluids or medicine down, dizziness/lightheadedness, or a change in mental status.   ----------------  Hypoglycemia    Hypoglycemia occurs when the glucose (sugar) level in your blood is too low. Symptoms include confusion, weakness, or fainting. You may even appear to be having a stroke. Take medications exactly as prescribed by your health care professional. Maintain a healthy lifestyle and follow up with your primary care physician.    SEEK IMMEDIATE MEDICAL CARE IF YOU HAVE ANY OF THE FOLLOWING SYMPTOMS: weakness, fainting, change in mental status, nausea or vomiting, fruity smell to your breath, or any signs of dehydration. Hypoglycemia  UTI    Take the antibiotic prescribed.  Return for increased pain, flank pain, nausea/vomiting, fever, any other concerns.     Check your sugar before giving yourself insulin.  Do not give insulin if your sugar is low.   You may take your lantus this evening.     Follow up with your pmd and endocrinologist.     Urinary Tract Infection    A urinary tract infection (UTI) is an infection of any part of the urinary tract, which includes the kidneys, ureters, bladder, and urethra. Risk factors include ignoring your need to urinate, wiping back to front if female, being an uncircumcised male, and having diabetes or a weak immune system. Symptoms include frequent urination, pain or burning with urination, foul smelling urine, cloudy urine, pain in the lower abdomen, blood in the urine, and fever. If you were prescribed an antibiotic medicine, take it as told by your health care provider. Do not stop taking the antibiotic even if you start to feel better.    SEEK IMMEDIATE MEDICAL CARE IF YOU HAVE ANY OF THE FOLLOWING SYMPTOMS: severe back or abdominal pain, fever, inability to keep fluids or medicine down, dizziness/lightheadedness, or a change in mental status.   ----------------  Hypoglycemia    Hypoglycemia occurs when the glucose (sugar) level in your blood is too low. Symptoms include confusion, weakness, or fainting. You may even appear to be having a stroke. Take medications exactly as prescribed by your health care professional. Maintain a healthy lifestyle and follow up with your primary care physician.    SEEK IMMEDIATE MEDICAL CARE IF YOU HAVE ANY OF THE FOLLOWING SYMPTOMS: weakness, fainting, change in mental status, nausea or vomiting, fruity smell to your breath, or any signs of dehydration.

## 2024-10-21 NOTE — ED ADULT NURSE NOTE - CHIEF COMPLAINT QUOTE
+ weakness, AMS while on bus today with EMS noting BG 54 in field, given 1 tube po glucose  Pt reports feeling better now  PMH dm2 on insulin

## 2024-10-21 NOTE — ED PROVIDER NOTE - CARE PROVIDERS DIRECT ADDRESSES
How Severe Is Your Rash?: mild Is This A New Presentation, Or A Follow-Up?: Follow Up Rash ,[email or web address]

## 2024-10-21 NOTE — ED ADULT NURSE NOTE - OBJECTIVE STATEMENT
Pt is a 73yo female PMHx DM2, lung cancer metastatic to brain presenting to ED c/o hypoglycemia. Pt states, "I was on the bus going to get a PET scan when I felt lightheaded and passed out in my seat, my sugar was in the 50s." Pt is A&Ox4, breathing even and unlabored speaking in clear full sentences, ambulatory with walker, denies current lightheadedness, n/v/d, c/p, sob, f/c.

## 2024-10-21 NOTE — ED PROVIDER NOTE - OBJECTIVE STATEMENT
75 yo F h/o T2DM, Lung Ca with brain mets, R hip replacement, RA, CKD, DVT on eliquis, and frequent falls biba for hypoglycemia (glu 54).  Pt reports feeling dizzy and passing out when on the bus w/o associated diaphoresis, cp, n/v, ha.  Pt felt better after getting oral glucose from EMS.  Pt reports taking her normal 14 units reg before breakfast today, ate her normal size breakfast.  Pt takes reg insulin TID before meals.  Pt did not check her sugar today before using her insulin.  Pt took her PM lantus dose 23 units and reports normal dinner last pm.  No uri sx, cough, sob, abd pain, n/v/d (reports some diarrhea last wk), dysuria, freq, urgency.  Pt feeling well now.

## 2024-10-21 NOTE — ED PROVIDER NOTE - CARE PROVIDER_API CALL
Marissa Douglass J  Critical Care Medicine  [address] [address]  Phone: [phone]  Fax: [phone]  Follow Up Time:

## 2024-10-21 NOTE — ED ADULT TRIAGE NOTE - CHIEF COMPLAINT QUOTE
+ weakness, AMS while on bus today with EMS noting BG 54 in field, given 1 tube po glucose  Pt reports feeling better now + weakness, AMS while on bus today with EMS noting BG 54 in field, given 1 tube po glucose  Pt reports feeling better now  PMH dm2 on insulin

## 2024-10-21 NOTE — ED PROVIDER NOTE - CLINICAL SUMMARY MEDICAL DECISION MAKING FREE TEXT BOX
Pt biba for hypoglycemia w syncope on bus - suspect 2/2 hypoglycemia.  FS 85 in ed after oral glucose w ems.  Pt feeling well.  No preceding or current sx to suggest infection, cardiac event.  ? worsening ckd, ? med related - pt did not check fs before insulin this am.  Plan to monitor glu, pt given food, will check labs, ekg; reassess. See progress notes for further mdm related documentation.

## 2024-10-21 NOTE — ED ADULT TRIAGE NOTE - ARRIVAL FROM
Your test results will be communicated to you via: [facility], Telephone or Letter.  If you have not received your test results within one week. Please contact the clinic.      
Home

## 2024-10-21 NOTE — ED PROVIDER NOTE - PROGRESS NOTE DETAILS
Initial labs w ely, elevated trop.  Rpt trop and cr much improved after ivf.  Suspect trop elevation 2/2 ely; prior trop in 40's like repeat.  No c/o cp/ekg changes to make me concerned for acs.  FS stable.  + uti.  Pt denies sx but ua v positive - will treat; cx pending.  Will dc w po abx, to fu w pmd.  PMD, Dr Douglass, called - VM left about ed visit. Initial labs w ely, elevated trop.  Rpt trop and cr much improved after ivf.  However, k, cl, ca v abnl - ? if sample diluted.  VBG w nl K, ca.  Rpt bmp w nl k, ca, cr downtrending.   Suspect trop elevation 2/2 ely; prior trop in 40's like repeat.  No c/o cp/ekg changes to make me concerned for acs.  FS stable.  + uti.  Pt denies sx but ua v positive - will treat; cx pending.  Will dc w po abx, to fu w pmd.  PMD, Dr Douglass, called - VM left about ed visit.

## 2024-10-22 ENCOUNTER — APPOINTMENT (OUTPATIENT)
Dept: HOME HEALTH SERVICES | Facility: HOME HEALTH | Age: 74
End: 2024-10-22

## 2024-10-24 ENCOUNTER — APPOINTMENT (OUTPATIENT)
Dept: INFUSION THERAPY | Facility: CLINIC | Age: 74
End: 2024-10-24

## 2024-10-24 ENCOUNTER — APPOINTMENT (OUTPATIENT)
Dept: HEMATOLOGY ONCOLOGY | Facility: CLINIC | Age: 74
End: 2024-10-24
Payer: MEDICARE

## 2024-10-24 ENCOUNTER — OUTPATIENT (OUTPATIENT)
Dept: OUTPATIENT SERVICES | Facility: HOSPITAL | Age: 74
LOS: 1 days | End: 2024-10-24
Payer: MEDICARE

## 2024-10-24 VITALS
DIASTOLIC BLOOD PRESSURE: 65 MMHG | WEIGHT: 141 LBS | HEART RATE: 71 BPM | TEMPERATURE: 97.7 F | HEIGHT: 63 IN | BODY MASS INDEX: 24.98 KG/M2 | SYSTOLIC BLOOD PRESSURE: 102 MMHG | OXYGEN SATURATION: 97 % | RESPIRATION RATE: 18 BRPM

## 2024-10-24 VITALS
HEART RATE: 62 BPM | RESPIRATION RATE: 18 BRPM | TEMPERATURE: 98 F | SYSTOLIC BLOOD PRESSURE: 115 MMHG | OXYGEN SATURATION: 95 % | DIASTOLIC BLOOD PRESSURE: 66 MMHG

## 2024-10-24 VITALS
WEIGHT: 141.1 LBS | OXYGEN SATURATION: 95 % | TEMPERATURE: 98 F | SYSTOLIC BLOOD PRESSURE: 102 MMHG | RESPIRATION RATE: 18 BRPM | DIASTOLIC BLOOD PRESSURE: 65 MMHG | HEIGHT: 63 IN | HEART RATE: 71 BPM

## 2024-10-24 DIAGNOSIS — Z79.01 LONG TERM (CURRENT) USE OF ANTICOAGULANTS: ICD-10-CM

## 2024-10-24 DIAGNOSIS — R07.89 OTHER CHEST PAIN: ICD-10-CM

## 2024-10-24 DIAGNOSIS — E16.A1 HYPOGLYCEMIA LEVEL 1: ICD-10-CM

## 2024-10-24 DIAGNOSIS — E11.65 TYPE 2 DIABETES MELLITUS WITH HYPERGLYCEMIA: ICD-10-CM

## 2024-10-24 DIAGNOSIS — C34.90 MALIGNANT NEOPLASM OF UNSPECIFIED PART OF UNSPECIFIED BRONCHUS OR LUNG: ICD-10-CM

## 2024-10-24 DIAGNOSIS — E11.649 TYPE 2 DIABETES MELLITUS WITH HYPOGLYCEMIA WITHOUT COMA: ICD-10-CM

## 2024-10-24 DIAGNOSIS — I82.409 ACUTE EMBOLISM AND THROMBOSIS OF UNSPECIFIED DEEP VEINS OF UNSPECIFIED LOWER EXTREMITY: ICD-10-CM

## 2024-10-24 DIAGNOSIS — Z91.018 ALLERGY TO OTHER FOODS: ICD-10-CM

## 2024-10-24 DIAGNOSIS — N39.0 URINARY TRACT INFECTION, SITE NOT SPECIFIED: ICD-10-CM

## 2024-10-24 DIAGNOSIS — C79.31 MALIGNANT NEOPLASM OF UNSPECIFIED PART OF UNSPECIFIED BRONCHUS OR LUNG: ICD-10-CM

## 2024-10-24 DIAGNOSIS — Z86.718 PERSONAL HISTORY OF OTHER VENOUS THROMBOSIS AND EMBOLISM: ICD-10-CM

## 2024-10-24 DIAGNOSIS — Z85.118 PERSONAL HISTORY OF OTHER MALIGNANT NEOPLASM OF BRONCHUS AND LUNG: ICD-10-CM

## 2024-10-24 DIAGNOSIS — Z88.1 ALLERGY STATUS TO OTHER ANTIBIOTIC AGENTS: ICD-10-CM

## 2024-10-24 DIAGNOSIS — E11.22 TYPE 2 DIABETES MELLITUS WITH DIABETIC CHRONIC KIDNEY DISEASE: ICD-10-CM

## 2024-10-24 DIAGNOSIS — Z79.4 TYPE 2 DIABETES MELLITUS WITH HYPERGLYCEMIA: ICD-10-CM

## 2024-10-24 DIAGNOSIS — M06.9 RHEUMATOID ARTHRITIS, UNSPECIFIED: ICD-10-CM

## 2024-10-24 LAB
-  AMPICILLIN: SIGNIFICANT CHANGE UP
-  CIPROFLOXACIN: SIGNIFICANT CHANGE UP
-  LEVOFLOXACIN: SIGNIFICANT CHANGE UP
-  LINEZOLID: SIGNIFICANT CHANGE UP
-  NITROFURANTOIN: SIGNIFICANT CHANGE UP
-  TETRACYCLINE: SIGNIFICANT CHANGE UP
-  VANCOMYCIN: SIGNIFICANT CHANGE UP
ALBUMIN SERPL ELPH-MCNC: 3.3 G/DL
ALP BLD-CCNC: 89 U/L
ALT SERPL-CCNC: 7 U/L
ANION GAP SERPL CALC-SCNC: 12 MMOL/L
AST SERPL-CCNC: 21 U/L
BILIRUB SERPL-MCNC: 0.6 MG/DL
BUN SERPL-MCNC: 31 MG/DL
CALCIUM SERPL-MCNC: 10.1 MG/DL
CHLORIDE SERPL-SCNC: 103 MMOL/L
CO2 SERPL-SCNC: 27 MMOL/L
CREAT SERPL-MCNC: 2.2 MG/DL
CULTURE RESULTS: ABNORMAL
EGFR: 23 ML/MIN/1.73M2
GLUCOSE SERPL-MCNC: 113 MG/DL
HCT VFR BLD CALC: 33.2 %
HGB BLD-MCNC: 10.5 G/DL
LYMPHOCYTES # BLD AUTO: 2.1 K/UL
LYMPHOCYTES NFR BLD AUTO: 18.8 %
MAN DIFF?: NO
MCHC RBC-ENTMCNC: 29.6 PG
MCHC RBC-ENTMCNC: 31.6 GM/DL
MCV RBC AUTO: 93.5 FL
METHOD TYPE: SIGNIFICANT CHANGE UP
NEUTROPHILS # BLD AUTO: 8.2 K/UL
NEUTROPHILS NFR BLD AUTO: 72.2 %
ORGANISM # SPEC MICROSCOPIC CNT: ABNORMAL
ORGANISM # SPEC MICROSCOPIC CNT: SIGNIFICANT CHANGE UP
PLATELET # BLD AUTO: 223 K/UL
POTASSIUM SERPL-SCNC: 4.1 MMOL/L
PROT SERPL-MCNC: 7.9 G/DL
RBC # BLD: 3.55 M/UL
RBC # FLD: 17.3 %
SODIUM SERPL-SCNC: 142 MMOL/L
SPECIMEN SOURCE: SIGNIFICANT CHANGE UP
WBC # FLD AUTO: 11.3 K/UL

## 2024-10-24 PROCEDURE — 99215 OFFICE O/P EST HI 40 MIN: CPT

## 2024-10-24 PROCEDURE — G2211 COMPLEX E/M VISIT ADD ON: CPT

## 2024-10-24 PROCEDURE — 96375 TX/PRO/DX INJ NEW DRUG ADDON: CPT

## 2024-10-24 PROCEDURE — 96413 CHEMO IV INFUSION 1 HR: CPT

## 2024-10-24 RX ORDER — SODIUM CHLORIDE 9 MG/ML
10 INJECTION, SOLUTION INTRAMUSCULAR; INTRAVENOUS; SUBCUTANEOUS ONCE
Refills: 0 | Status: COMPLETED | OUTPATIENT
Start: 2024-10-24 | End: 2024-10-24

## 2024-10-24 RX ORDER — GEMCITABINE HYDROCHLORIDE 200 MG/5.26ML
1336 INJECTION INTRAVENOUS ONCE
Refills: 0 | Status: COMPLETED | OUTPATIENT
Start: 2024-10-24 | End: 2024-10-24

## 2024-10-24 RX ORDER — PALONOSETRON HYDROCHLORIDE 0.05 MG/ML
0.25 INJECTION INTRAVENOUS ONCE
Refills: 0 | Status: COMPLETED | OUTPATIENT
Start: 2024-10-24 | End: 2024-10-24

## 2024-10-24 RX ORDER — LIDOCAINE HYDROCHLORIDE 40 MG/ML
1 SOLUTION TOPICAL ONCE
Refills: 0 | Status: COMPLETED | OUTPATIENT
Start: 2024-10-24 | End: 2024-10-24

## 2024-10-24 RX ADMIN — SODIUM CHLORIDE 10 MILLILITER(S): 9 INJECTION, SOLUTION INTRAMUSCULAR; INTRAVENOUS; SUBCUTANEOUS at 16:31

## 2024-10-24 RX ADMIN — GEMCITABINE HYDROCHLORIDE 1336 MILLIGRAM(S): 200 INJECTION INTRAVENOUS at 17:09

## 2024-10-24 RX ADMIN — PALONOSETRON HYDROCHLORIDE 0.25 MILLIGRAM(S): 0.05 INJECTION INTRAVENOUS at 16:31

## 2024-10-25 DIAGNOSIS — C34.90 MALIGNANT NEOPLASM OF UNSPECIFIED PART OF UNSPECIFIED BRONCHUS OR LUNG: ICD-10-CM

## 2024-10-25 LAB — TSH SERPL-ACNC: 1.11 UIU/ML

## 2024-10-31 ENCOUNTER — NON-APPOINTMENT (OUTPATIENT)
Age: 74
End: 2024-10-31

## 2024-10-31 ENCOUNTER — APPOINTMENT (OUTPATIENT)
Dept: HEMATOLOGY ONCOLOGY | Facility: CLINIC | Age: 74
End: 2024-10-31

## 2024-10-31 DIAGNOSIS — M06.9 RHEUMATOID ARTHRITIS, UNSPECIFIED: ICD-10-CM

## 2024-10-31 RX ORDER — PREDNISONE 20 MG/1
20 TABLET ORAL DAILY
Qty: 10 | Refills: 0 | Status: ACTIVE | COMMUNITY
Start: 2024-10-31 | End: 1900-01-01

## 2024-11-08 NOTE — H&P ADULT - PROBLEM SELECTOR PLAN 4
Physical (/)      HISTORY    The patient is a pleasant and cooperative 45 year old female who comes in for Physical (/)    The patient relates she is here for a physical.  she has been in good health without hospitalization for the past year.     Preventative Health measure include reducing cardiovascular risks, immunizations and cancer screenings.    Health Goals:  Increase health span.  Start on a heart healthy lifestyle.  45 minutes of physical activity daily, weight in healthy range,  low sodium diet, alcohol in moderation and dont smoke       MEDICATIONS  Current Outpatient Medications   Medication Sig    norethindrone (MICRONOR) 0.35 MG tablet Take 1 tablet by mouth daily.    aluminum chloride (DRYSOL) 20 % topical solution Apply to areas of excessive sweating at night before going to bed. (after drying that area with a towel)     compounded derm cream Apply to warts daily at bedtime and then cover with a band-aid. In the morning remove the dead skin with a nail file.    Ascorbic Acid (vitamin C) 500 MG tablet Take 500 mg by mouth daily.    ferrous sulfate 325 (65 FE) MG tablet Take 325 mg by mouth daily.    Multiple Vitamins-Minerals (MULTIVITAMIN ADULTS PO) Take 1 capsule by mouth daily.    cetirizine (ZyrTEC) 10 MG tablet Take 10 mg by mouth daily.     No current facility-administered medications for this visit.       ALLERGIES  Allergies as of 11/08/2024 - Reviewed 11/08/2024   Allergen Reaction Noted    Seasonal Other (See Comments) 10/03/2012       HISTORIES  Past Medical History:   Diagnosis Date    Anemia     Factor V Leiden  (CMD)        Past Surgical History:   Procedure Laterality Date    Colonoscopy  12/08/2023    Hysteroscopy w/ polypectomy      Mass excision Left 06/16/2022    Derrig. left upper back       Family History   Problem Relation Age of Onset    Hypertension Mother     Diabetes Father     Heart Father     Cataracts Maternal Grandfather     Glaucoma Paternal Grandmother        Social  History     Occupational History    Not on file   Tobacco Use    Smoking status: Never     Passive exposure: Never    Smokeless tobacco: Never   Vaping Use    Vaping status: never used   Substance and Sexual Activity    Alcohol use: Never    Drug use: Never    Sexual activity: Not on file       REVIEW OF SYSTEMS    Constitutional: Patient relates no fever, chill, tiredness/fatigue  Head:       Patient relates no trauma to the head, no concussion or headache.  Eyes:        Patient relates no blurry vision, eye pain.  ENT:        Patient relates no hearing changes, sinus pain, or sore throat.  Respiratory:     Patient relates no shortness of breath, cough, or hemoptysis.  Cardiovascular:  Patient relates no chest pain, dyspnea, palpitation                                  Denies chest pressure,chest tightness, chest discomfort.       Denies pain radiating to neck or shoulders.  Gastrointestinal: Patient relates no difficulty swallowing, abdominal pain.     Denies nausea, vomiting, diarrhea, constipation.     Denies melena, changes in bowel habits.  Genitourinary:  Patient relates no pain, burning or itching on urination.     Denies nocturia, hematuria, incontinence     Denies weak or slow urinary stream.  Integument: Patient relates no rash, changes in moles, no lesions seen.  Musculoskeletal: Patient relates no changes in strength in upper or lower extremities.  Neurologic:  Patient relates no dizziness or sensory changes.   Psychiatric:  Patient relates no changes in mood, anxiety, insomnia.  Endocrine: Patient relates no weight changes, temperature intolerance.    Denies polyuria, frequent thirst  Hematologic: Patient relates no bleeding, bruising or clotting issues.  Lymphatic: Patient relates no enlarged or tender lymph nodes.  Allergy: Patient relates no seasonal or food allergies    All other systems were reviewed and are negative.      PHYSICAL EXAMINATION  Vitals:  Visit Vitals  /68 (BP Location: [facility]  Left upper extremity, Patient Position: Sitting, Cuff Size: Regular)   Pulse 67   Temp 96.2 °F (35.7 °C) (Temporal)   Resp 14   Ht 5' 6\" (1.676 m)   Wt 75.8 kg (167 lb)   LMP 10/26/2024   SpO2 99%   BMI 26.95 kg/m²       BP Readings from Last 4 Encounters:   11/08/24 124/68   10/13/23 112/68   10/10/22 110/82   06/27/22 102/60       Wt Readings from Last 4 Encounters:   11/08/24 75.8 kg (167 lb)   10/13/23 74.4 kg (164 lb)   07/01/22 71.1 kg (156 lb 12 oz)   06/27/22 71.7 kg (158 lb)       General:  No acute distress, conversant.  Skin:  Warm with normal turgor.  No rash observed.  Surface texture smooth.  Head:  Atraumatic and normocephalic.  Eyes:  Pupils equal, reactive to light and accommodation.    Extraocular movement intact, eyelids and conjunctivae appear normal,   no excessive tearing or discharge.  Ears:  Gross hearing intact, external ears normal in appearance, nontender to palpation.   Otoscopic Exam:      Right - Tympanic membrane is clear and canal clear, no erythema or swelling.                Left - Tympanic membrane is clear and canal clear, no erythema or swelling.  Nose:   No flaring or discharge present.  Throat:  Oropharynx is clear with moist mucous membranes, no redness or exudate present.  No mucosal ulcerations.  Normal hard and soft palate.  Tongue is midline.  Neck:  Supple with no significant adenopathy, no thyromegaly.  Trachea midline; full range of motion.  Respiratory: Clear to auscultation, no wheezing or rhonchi noted.  Nonlabored breathing. No costal retractions.  No accessory muscle use.  Cardiovascular:  Regular rate and rhythm, no murmur present, carotid and radial pulses symmetrical. Capillary refill is quick, less than 2 seconds.  Gastrointestinal:  Soft, no guarding or masses.  Bowels sounds x 4.  No hepatosplenomegaly or costovertebral angle tenderness.  Musculoskeletal:  Upper Extremities:     Full range of motion, with normal appearing joints.  Lower Extremities:     No  edema, erythema, or cyanosis, nontender to palpation.    Full range of motion with normal appearing joints.  Neurological:  Cranial Nerves II-XII show no gross abnormalities.  No tremor present.  Lymphatic:  No cervical lymphadenopathy.  No extremity lymphadenopathy.  Psychiatric:  Alert and oriented x4.  Judgment good, insight good.                      Denies lack of interest in daily activities, denies feeling sad or blue.                       Denies depression.        Review Flowsheet  More data exists         11/8/2024   PHQ 2/9 Score   Adult PHQ 2 Score 0   Adult PHQ 2 Interpretation No further screening needed   Little interest or pleasure in activity? Not at all   Feeling down, depressed or hopeless? Not at all      Details                   1. Annual physical exam    2. Need for vaccination    3. Lipid screening    4. Vitamin D deficiency        PLAN  Cardiovascular Prevention    Your risk of a heart attack or stroke within 10 years is low risk at 0.7%.    - Current blood pressure is /68  Risk Enhancing Factors:  - Family History of a Premature Stroke or Heart Attack: No  Shared Decision Making:  - Discuss a healthy diet, exercise and/or sleep to improve blood pressure, cholesterol and blood sugar  - Plan: Heart Healthy Lifestyle        Corry was seen today for physical.    Diagnoses and all orders for this visit:    Annual physical exam  - Reviewed chronic and acute problem list and risk factors.   - Obtained prior charts for review.   - Review of charts, medications, immunizations, allergies, medical/surgical/family/social history.  - Significant findings and/or changes were noted on patient’s history form.  - Reviewed illness prevention, disease prevention,  and preventative healthcare measures including immunizations, cardiovascular, and cancer screenings.    -     CBC with Automated Differential; Future  -     Comprehensive Metabolic Panel; Future    Need for vaccination  -     INFLUENZA  (FLULAVAL)    Lipid screening  -     Lipid Panel Without Reflex; Future    Vitamin D deficiency  -     Vitamin D -25 Hydroxy; Future        Prescription medications including controlled substances, over the counter medication, vitamins, supplement, and dietary aids are optimized for each individual and are not to be shared, sold, re-purposed, or redistributed. There are serious and significant health risks that could result in death,  coma, short, and/or long term injury even when taken as prescribed. Benefits and risk have been reviewed. They are to be used for their intended purpose and are here to help you develop and achieve your health goals.       Reviewed benefits and risks of prescription medication with goal and intent to improve overall patient well being. If controlled substances prescribed, discussed benefits and risks of controlled substances including opioid and benzodiazepines.  Please feel free to contact the clinic or the pharmacy for question or concerns.    Follow up in 12 months or sooner if there is no improvement in symptoms or any time sooner if questions, concerns or worsening symptoms.          Thank you, again, for entrusting me with your care. Please do not hesitate to contact me with any questions or concerns, or if I can be of any assistance.      Baseline Cr   - f/u cystatin c Follows with Dr Delaney  Last chemo session 11/16- has been holding chemo sessions due to hyperglycemia  MRH 10/24 showed improvement in R temporal mass and vasogenic edema   Has port placed at [facility] 9/2023 per patient- site clean and dry reports last accessed 11/2023 for possible chemo  - f/u outpatient Follows with Dr Delaney  Last chemo session 11/16- has been holding chemo sessions due to hyperglycemia  MR head 10/23 showing R posterior frontal enhancing lesion with vasogenic edema which resolved on MRH 10/23  Has port placed at [facility] 9/2023 per patient- site clean and dry reports last accessed 11/2023 for possible chemo  - f/u outpatient Reports she had flu vaccine 2 weeks ago and has had cough since then. Denies fever, chills, sick contacts  Afebrile, mild leukocytosis  s/p 1L LR  - c/w PO fluids   - c/w supportive measures

## 2024-11-11 ENCOUNTER — INPATIENT (INPATIENT)
Facility: HOSPITAL | Age: 74
LOS: 8 days | Discharge: NURSING, REHAB OR LONG-TERM CARE | End: 2024-11-20
Attending: INTERNAL MEDICINE | Admitting: STUDENT IN AN ORGANIZED HEALTH CARE EDUCATION/TRAINING PROGRAM
Payer: MEDICARE

## 2024-11-11 ENCOUNTER — APPOINTMENT (OUTPATIENT)
Dept: HEMATOLOGY ONCOLOGY | Facility: CLINIC | Age: 74
End: 2024-11-11
Payer: MEDICARE

## 2024-11-11 VITALS
SYSTOLIC BLOOD PRESSURE: 102 MMHG | RESPIRATION RATE: 17 BRPM | DIASTOLIC BLOOD PRESSURE: 77 MMHG | TEMPERATURE: 98 F | OXYGEN SATURATION: 97 % | HEART RATE: 73 BPM | HEIGHT: 63 IN | WEIGHT: 145.06 LBS

## 2024-11-11 VITALS
TEMPERATURE: 97.9 F | HEIGHT: 63 IN | SYSTOLIC BLOOD PRESSURE: 80 MMHG | HEART RATE: 107 BPM | DIASTOLIC BLOOD PRESSURE: 54 MMHG | RESPIRATION RATE: 18 BRPM | OXYGEN SATURATION: 97 %

## 2024-11-11 DIAGNOSIS — I82.409 ACUTE EMBOLISM AND THROMBOSIS OF UNSPECIFIED DEEP VEINS OF UNSPECIFIED LOWER EXTREMITY: ICD-10-CM

## 2024-11-11 DIAGNOSIS — C79.31 MALIGNANT NEOPLASM OF UNSPECIFIED PART OF UNSPECIFIED BRONCHUS OR LUNG: ICD-10-CM

## 2024-11-11 DIAGNOSIS — R68.89 OTHER GENERAL SYMPTOMS AND SIGNS: ICD-10-CM

## 2024-11-11 DIAGNOSIS — Z96.641 PRESENCE OF RIGHT ARTIFICIAL HIP JOINT: Chronic | ICD-10-CM

## 2024-11-11 DIAGNOSIS — C34.90 MALIGNANT NEOPLASM OF UNSPECIFIED PART OF UNSPECIFIED BRONCHUS OR LUNG: ICD-10-CM

## 2024-11-11 DIAGNOSIS — Z65.8 OTHER SPECIFIED PROBLEMS RELATED TO PSYCHOSOCIAL CIRCUMSTANCES: ICD-10-CM

## 2024-11-11 LAB
ANION GAP SERPL CALC-SCNC: 12 MMOL/L — SIGNIFICANT CHANGE UP (ref 5–17)
BASOPHILS # BLD AUTO: 0.07 K/UL — SIGNIFICANT CHANGE UP (ref 0–0.2)
BASOPHILS NFR BLD AUTO: 0.6 % — SIGNIFICANT CHANGE UP (ref 0–2)
BUN SERPL-MCNC: 32 MG/DL — HIGH (ref 7–23)
CALCIUM SERPL-MCNC: 9.2 MG/DL — SIGNIFICANT CHANGE UP (ref 8.4–10.5)
CHLORIDE SERPL-SCNC: 96 MMOL/L — SIGNIFICANT CHANGE UP (ref 96–108)
CO2 SERPL-SCNC: 27 MMOL/L — SIGNIFICANT CHANGE UP (ref 22–31)
CREAT SERPL-MCNC: 1.9 MG/DL — HIGH (ref 0.5–1.3)
D DIMER BLD IA.RAPID-MCNC: 449 NG/ML DDU — HIGH
EGFR: 27 ML/MIN/1.73M2 — LOW
EOSINOPHIL # BLD AUTO: 0.07 K/UL — SIGNIFICANT CHANGE UP (ref 0–0.5)
EOSINOPHIL NFR BLD AUTO: 0.6 % — SIGNIFICANT CHANGE UP (ref 0–6)
GLUCOSE SERPL-MCNC: 345 MG/DL — HIGH (ref 70–99)
HCT VFR BLD CALC: 38.7 % — SIGNIFICANT CHANGE UP (ref 34.5–45)
HGB BLD-MCNC: 12.4 G/DL — SIGNIFICANT CHANGE UP (ref 11.5–15.5)
IMM GRANULOCYTES NFR BLD AUTO: 0.7 % — SIGNIFICANT CHANGE UP (ref 0–0.9)
LYMPHOCYTES # BLD AUTO: 1.18 K/UL — SIGNIFICANT CHANGE UP (ref 1–3.3)
LYMPHOCYTES # BLD AUTO: 9.7 % — LOW (ref 13–44)
MCHC RBC-ENTMCNC: 29.2 PG — SIGNIFICANT CHANGE UP (ref 27–34)
MCHC RBC-ENTMCNC: 32 G/DL — SIGNIFICANT CHANGE UP (ref 32–36)
MCV RBC AUTO: 91.1 FL — SIGNIFICANT CHANGE UP (ref 80–100)
MONOCYTES # BLD AUTO: 0.72 K/UL — SIGNIFICANT CHANGE UP (ref 0–0.9)
MONOCYTES NFR BLD AUTO: 5.9 % — SIGNIFICANT CHANGE UP (ref 2–14)
NEUTROPHILS # BLD AUTO: 10 K/UL — HIGH (ref 1.8–7.4)
NEUTROPHILS NFR BLD AUTO: 82.5 % — HIGH (ref 43–77)
NRBC # BLD: 0 /100 WBCS — SIGNIFICANT CHANGE UP (ref 0–0)
PLATELET # BLD AUTO: 214 K/UL — SIGNIFICANT CHANGE UP (ref 150–400)
POTASSIUM SERPL-MCNC: 4.3 MMOL/L — SIGNIFICANT CHANGE UP (ref 3.5–5.3)
POTASSIUM SERPL-SCNC: 4.3 MMOL/L — SIGNIFICANT CHANGE UP (ref 3.5–5.3)
RBC # BLD: 4.25 M/UL — SIGNIFICANT CHANGE UP (ref 3.8–5.2)
RBC # FLD: 15.8 % — HIGH (ref 10.3–14.5)
SODIUM SERPL-SCNC: 135 MMOL/L — SIGNIFICANT CHANGE UP (ref 135–145)
TROPONIN T, HIGH SENSITIVITY RESULT: 88 NG/L — CRITICAL HIGH (ref 0–51)
WBC # BLD: 12.13 K/UL — HIGH (ref 3.8–10.5)
WBC # FLD AUTO: 12.13 K/UL — HIGH (ref 3.8–10.5)

## 2024-11-11 PROCEDURE — 99285 EMERGENCY DEPT VISIT HI MDM: CPT

## 2024-11-11 PROCEDURE — G2211 COMPLEX E/M VISIT ADD ON: CPT

## 2024-11-11 PROCEDURE — 93010 ELECTROCARDIOGRAM REPORT: CPT

## 2024-11-11 PROCEDURE — 71045 X-RAY EXAM CHEST 1 VIEW: CPT | Mod: 26

## 2024-11-11 PROCEDURE — 99215 OFFICE O/P EST HI 40 MIN: CPT

## 2024-11-11 PROCEDURE — 70450 CT HEAD/BRAIN W/O DYE: CPT | Mod: 26,MC

## 2024-11-11 NOTE — ED PROVIDER NOTE - OBJECTIVE STATEMENT
73 yo F was sent from Oncologist's office.  She seemed confused to them as she showed up on the wrong day.  The patient reports that she lost tract of the time and repeats this.  She is on the phone with her daughter who reports that her mother has had increasing problems with her memory and lives alone 73 yo F PMH lung ca, HLD, DM, CKD sent from her Oncologist's office.  She showed up on the wrong day for an infusion and was noted to be confused.  EMS reported low BP.  Patient reports that she "lost track of time."  She is oriented, but repeats this phrase and cannot give many other details of the day.  Her daughter is on the phone.  Her daughter reports that the patient has been having increasing problems with her memory, does sometimes repeat herself and that she lives alone.  Patient denies ha, cp, sob, abd pain, numbness, weakness, cough, recent illness.

## 2024-11-11 NOTE — ED ADULT TRIAGE NOTE - CHIEF COMPLAINT QUOTE
Pt presents to ED by EMS C/O "AMS". EMS states, " She has lung cancer, she showed up at her infusion center on the wrong appointment day and they said she was acting confused and was hypotensive on arrival, they said she has a home health aid, we weren't able to speak with any family." MARY PLATA.  BG/ EKG in progress. Pt denies symptoms.

## 2024-11-11 NOTE — ED ADULT NURSE NOTE - NSFALLRISKINTERV_ED_ALL_ED
Assistance OOB with selected safe patient handling equipment if applicable/Assistance with ambulation/Communicate fall risk and risk factors to all staff, patient, and family/Monitor gait and stability/Monitor for mental status changes and reorient to person, place, and time, as needed/Provide patient with walking aids/Provide visual cue: yellow wristband, yellow gown, etc/Reinforce activity limits and safety measures with patient and family/Toileting schedule using arm’s reach rule for commode and bathroom/Use of alarms - bed, stretcher, chair and/or video monitoring/Call bell, personal items and telephone in reach/Instruct patient to call for assistance before getting out of bed/chair/stretcher/Non-slip footwear applied when patient is off stretcher/[place] to call system/Physically safe environment - no spills, clutter or unnecessary equipment/Purposeful Proactive Rounding/Room/bathroom lighting operational, light cord in reach

## 2024-11-11 NOTE — ED PROVIDER NOTE - PHYSICAL EXAMINATION
General:  Well appearing, no distress  HEENT:  No conjunctival injection, neck supple, no congestion   Chest:  Non-tender, no crepitance  Lungs:  Clear to auscultation bilaterally   Heart:  s1s2 normal, no murmur  Abdomen:  soft, non-tender, non-distended  :  Deferred  Rectal:  Deferred  Extremities: No edema, normal perfusion, no joint swelling or tenderness  Neuro:  Alert, oriented, conversant with clear fluent speech, no facial droop, no drift, motor/sensory intact

## 2024-11-11 NOTE — ED PROVIDER NOTE - CLINICAL SUMMARY MEDICAL DECISION MAKING FREE TEXT BOX
73 yo F PMH noted above sent from Oncology office for confusion, noted to be hypotensive by EMS, stable vitals in ER.  Patient assessed with labs for infection, CNS malignancy,  Noted to have elevated troponin.  In light of her living alone, elevated trop, unclear home plan for cognitive decline, patient was admitted for further care.

## 2024-11-11 NOTE — ED ADULT NURSE NOTE - OBJECTIVE STATEMENT
74yF pmhx lung cancer mets to miguel, CKD, HLD, DM2, RA BIBEMS complaining of AMS. Pt states she woke up today around 1:30pm but felt confused. EMS reports patient was hypotensive on their arrival. Says she lives alone and has no family. PT poor historian. Denies pain, headache, numbness/tingling, F/C, SOB/CP, N/V/D.

## 2024-11-12 ENCOUNTER — NON-APPOINTMENT (OUTPATIENT)
Age: 74
End: 2024-11-12

## 2024-11-12 ENCOUNTER — TRANSCRIPTION ENCOUNTER (OUTPATIENT)
Age: 74
End: 2024-11-12

## 2024-11-12 DIAGNOSIS — N17.9 ACUTE KIDNEY FAILURE, UNSPECIFIED: ICD-10-CM

## 2024-11-12 DIAGNOSIS — E78.5 HYPERLIPIDEMIA, UNSPECIFIED: ICD-10-CM

## 2024-11-12 DIAGNOSIS — R79.89 OTHER SPECIFIED ABNORMAL FINDINGS OF BLOOD CHEMISTRY: ICD-10-CM

## 2024-11-12 DIAGNOSIS — R73.9 HYPERGLYCEMIA, UNSPECIFIED: ICD-10-CM

## 2024-11-12 DIAGNOSIS — Z29.9 ENCOUNTER FOR PROPHYLACTIC MEASURES, UNSPECIFIED: ICD-10-CM

## 2024-11-12 DIAGNOSIS — C34.90 MALIGNANT NEOPLASM OF UNSPECIFIED PART OF UNSPECIFIED BRONCHUS OR LUNG: ICD-10-CM

## 2024-11-12 DIAGNOSIS — R41.82 ALTERED MENTAL STATUS, UNSPECIFIED: ICD-10-CM

## 2024-11-12 DIAGNOSIS — E11.9 TYPE 2 DIABETES MELLITUS WITHOUT COMPLICATIONS: ICD-10-CM

## 2024-11-12 DIAGNOSIS — M06.9 RHEUMATOID ARTHRITIS, UNSPECIFIED: ICD-10-CM

## 2024-11-12 DIAGNOSIS — I10 ESSENTIAL (PRIMARY) HYPERTENSION: ICD-10-CM

## 2024-11-12 LAB
ANION GAP SERPL CALC-SCNC: 8 MMOL/L — SIGNIFICANT CHANGE UP (ref 5–17)
BASOPHILS # BLD AUTO: 0.05 K/UL — SIGNIFICANT CHANGE UP (ref 0–0.2)
BASOPHILS NFR BLD AUTO: 0.6 % — SIGNIFICANT CHANGE UP (ref 0–2)
BUN SERPL-MCNC: 34 MG/DL — HIGH (ref 7–23)
CALCIUM SERPL-MCNC: 9 MG/DL — SIGNIFICANT CHANGE UP (ref 8.4–10.5)
CHLORIDE SERPL-SCNC: 101 MMOL/L — SIGNIFICANT CHANGE UP (ref 96–108)
CO2 SERPL-SCNC: 29 MMOL/L — SIGNIFICANT CHANGE UP (ref 22–31)
CREAT ?TM UR-MCNC: 105 MG/DL — SIGNIFICANT CHANGE UP
CREAT SERPL-MCNC: 1.94 MG/DL — HIGH (ref 0.5–1.3)
EGFR: 27 ML/MIN/1.73M2 — LOW
EOSINOPHIL # BLD AUTO: 0.1 K/UL — SIGNIFICANT CHANGE UP (ref 0–0.5)
EOSINOPHIL NFR BLD AUTO: 1.2 % — SIGNIFICANT CHANGE UP (ref 0–6)
GLUCOSE SERPL-MCNC: 174 MG/DL — HIGH (ref 70–99)
HCT VFR BLD CALC: 34.5 % — SIGNIFICANT CHANGE UP (ref 34.5–45)
HGB BLD-MCNC: 11 G/DL — LOW (ref 11.5–15.5)
IMM GRANULOCYTES NFR BLD AUTO: 0.5 % — SIGNIFICANT CHANGE UP (ref 0–0.9)
LYMPHOCYTES # BLD AUTO: 1.53 K/UL — SIGNIFICANT CHANGE UP (ref 1–3.3)
LYMPHOCYTES # BLD AUTO: 17.9 % — SIGNIFICANT CHANGE UP (ref 13–44)
MAGNESIUM SERPL-MCNC: 1.5 MG/DL — LOW (ref 1.6–2.6)
MCHC RBC-ENTMCNC: 29.3 PG — SIGNIFICANT CHANGE UP (ref 27–34)
MCHC RBC-ENTMCNC: 31.9 G/DL — LOW (ref 32–36)
MCV RBC AUTO: 92 FL — SIGNIFICANT CHANGE UP (ref 80–100)
MONOCYTES # BLD AUTO: 0.68 K/UL — SIGNIFICANT CHANGE UP (ref 0–0.9)
MONOCYTES NFR BLD AUTO: 7.9 % — SIGNIFICANT CHANGE UP (ref 2–14)
NEUTROPHILS # BLD AUTO: 6.17 K/UL — SIGNIFICANT CHANGE UP (ref 1.8–7.4)
NEUTROPHILS NFR BLD AUTO: 71.9 % — SIGNIFICANT CHANGE UP (ref 43–77)
NRBC # BLD: 0 /100 WBCS — SIGNIFICANT CHANGE UP (ref 0–0)
OSMOLALITY UR: 553 MOSM/KG — SIGNIFICANT CHANGE UP (ref 300–900)
PHOSPHATE SERPL-MCNC: 2.8 MG/DL — SIGNIFICANT CHANGE UP (ref 2.5–4.5)
PLATELET # BLD AUTO: 177 K/UL — SIGNIFICANT CHANGE UP (ref 150–400)
POTASSIUM SERPL-MCNC: 3.8 MMOL/L — SIGNIFICANT CHANGE UP (ref 3.5–5.3)
POTASSIUM SERPL-SCNC: 3.8 MMOL/L — SIGNIFICANT CHANGE UP (ref 3.5–5.3)
RBC # BLD: 3.75 M/UL — LOW (ref 3.8–5.2)
RBC # FLD: 15.6 % — HIGH (ref 10.3–14.5)
SODIUM SERPL-SCNC: 138 MMOL/L — SIGNIFICANT CHANGE UP (ref 135–145)
SODIUM UR-SCNC: 31 MMOL/L — SIGNIFICANT CHANGE UP
TROPONIN T, HIGH SENSITIVITY RESULT: 71 NG/L — CRITICAL HIGH (ref 0–51)
TROPONIN T, HIGH SENSITIVITY RESULT: 93 NG/L — CRITICAL HIGH (ref 0–51)
UUN UR-MCNC: 435 MG/DL — SIGNIFICANT CHANGE UP
WBC # BLD: 8.57 K/UL — SIGNIFICANT CHANGE UP (ref 3.8–10.5)
WBC # FLD AUTO: 8.57 K/UL — SIGNIFICANT CHANGE UP (ref 3.8–10.5)

## 2024-11-12 PROCEDURE — 99221 1ST HOSP IP/OBS SF/LOW 40: CPT | Mod: GC

## 2024-11-12 PROCEDURE — 99231 SBSQ HOSP IP/OBS SF/LOW 25: CPT

## 2024-11-12 RX ORDER — INSULIN GLARGINE 100 [IU]/ML
23 INJECTION, SOLUTION SUBCUTANEOUS AT BEDTIME
Refills: 0 | Status: DISCONTINUED | OUTPATIENT
Start: 2024-11-12 | End: 2024-11-13

## 2024-11-12 RX ORDER — 0.9 % SODIUM CHLORIDE 0.9 %
1000 INTRAVENOUS SOLUTION INTRAVENOUS
Refills: 0 | Status: DISCONTINUED | OUTPATIENT
Start: 2024-11-12 | End: 2024-11-20

## 2024-11-12 RX ORDER — NIFEDIPINE 10 MG
30 CAPSULE ORAL EVERY 24 HOURS
Refills: 0 | Status: DISCONTINUED | OUTPATIENT
Start: 2024-11-12 | End: 2024-11-12

## 2024-11-12 RX ORDER — SODIUM CHLORIDE 9 MG/ML
1000 INJECTION, SOLUTION INTRAMUSCULAR; INTRAVENOUS; SUBCUTANEOUS ONCE
Refills: 0 | Status: COMPLETED | OUTPATIENT
Start: 2024-11-12 | End: 2024-11-12

## 2024-11-12 RX ORDER — HEPARIN SODIUM,PORCINE 1000/ML
5000 VIAL (ML) INJECTION EVERY 8 HOURS
Refills: 0 | Status: DISCONTINUED | OUTPATIENT
Start: 2024-11-12 | End: 2024-11-20

## 2024-11-12 RX ORDER — POTASSIUM CHLORIDE 600 MG/1
20 TABLET, EXTENDED RELEASE ORAL ONCE
Refills: 0 | Status: COMPLETED | OUTPATIENT
Start: 2024-11-12 | End: 2024-11-12

## 2024-11-12 RX ORDER — ENOXAPARIN SODIUM 30 MG/.3ML
30 INJECTION SUBCUTANEOUS EVERY 24 HOURS
Refills: 0 | Status: DISCONTINUED | OUTPATIENT
Start: 2024-11-12 | End: 2024-11-12

## 2024-11-12 RX ORDER — CEFTRIAXONE SODIUM 1 G
2000 VIAL (EA) INJECTION EVERY 24 HOURS
Refills: 0 | Status: COMPLETED | OUTPATIENT
Start: 2024-11-12 | End: 2024-11-14

## 2024-11-12 RX ORDER — GLUCAGON INJECTION, SOLUTION 0.5 MG/.1ML
1 INJECTION, SOLUTION SUBCUTANEOUS ONCE
Refills: 0 | Status: DISCONTINUED | OUTPATIENT
Start: 2024-11-12 | End: 2024-11-20

## 2024-11-12 RX ORDER — HUMAN INSULIN 100 [IU]/ML
10 INJECTION, SUSPENSION SUBCUTANEOUS ONCE
Refills: 0 | Status: COMPLETED | OUTPATIENT
Start: 2024-11-12 | End: 2024-11-12

## 2024-11-12 RX ADMIN — Medication 40 MILLIGRAM(S): at 23:16

## 2024-11-12 RX ADMIN — Medication 100 MILLIGRAM(S): at 17:18

## 2024-11-12 RX ADMIN — Medication 6: at 09:00

## 2024-11-12 RX ADMIN — Medication 6 UNIT(S): at 02:24

## 2024-11-12 RX ADMIN — Medication 5000 UNIT(S): at 22:34

## 2024-11-12 RX ADMIN — Medication 14 UNIT(S): at 12:53

## 2024-11-12 RX ADMIN — Medication 30 MILLIGRAM(S): at 12:53

## 2024-11-12 RX ADMIN — Medication 5000 UNIT(S): at 13:24

## 2024-11-12 RX ADMIN — Medication 25 GRAM(S): at 15:12

## 2024-11-12 RX ADMIN — HUMAN INSULIN 10 UNIT(S): 100 INJECTION, SUSPENSION SUBCUTANEOUS at 09:54

## 2024-11-12 RX ADMIN — POTASSIUM CHLORIDE 20 MILLIEQUIVALENT(S): 600 TABLET, EXTENDED RELEASE ORAL at 15:12

## 2024-11-12 RX ADMIN — SODIUM CHLORIDE 500 MILLILITER(S): 9 INJECTION, SOLUTION INTRAMUSCULAR; INTRAVENOUS; SUBCUTANEOUS at 17:18

## 2024-11-12 RX ADMIN — INSULIN GLARGINE 23 UNIT(S): 100 INJECTION, SOLUTION SUBCUTANEOUS at 22:34

## 2024-11-12 RX ADMIN — Medication 14 UNIT(S): at 09:00

## 2024-11-12 RX ADMIN — Medication 6: at 22:34

## 2024-11-12 NOTE — PHYSICAL THERAPY INITIAL EVALUATION ADULT - GAIT DEVIATIONS NOTED, PT EVAL
slightly unsteady gait, no lose of balance. Pt declines to ambulate further distance secondary pt requested to return to bed./increased time in double stance/decreased weight-shifting ability

## 2024-11-12 NOTE — OCCUPATIONAL THERAPY INITIAL EVALUATION ADULT - PERTINENT HX OF CURRENT PROBLEM, REHAB EVAL
75 y/o F pmhx lung cancer mets to miguel, CKD, HLD, DM2, RA BIBEMS complaining of AMS. Pt states she woke up today around 1:30pm but felt confused. Per ED note, pt showed up at her infusion center on the wrong appointment day and they reported her acting confused. EMS reports patient was hypotensive on their arrival. Pt cannot recall specific reasons/details leading to her admission. Currently, she denies headache, lightheadedness, dizziness, SOB, CP, diarrhea, constipation, increase in thirst, polyuria, nausea or vomiting.

## 2024-11-12 NOTE — PHYSICAL THERAPY INITIAL EVALUATION ADULT - PERTINENT HX OF CURRENT PROBLEM, REHAB EVAL
Pt is a 75 yo female with pmhx lung cancer mets to miguel, CKD, HLD, DM2, RA BIBEMS for AMS iso hypotension.

## 2024-11-12 NOTE — DIETITIAN INITIAL EVALUATION ADULT - ADD RECOMMEND
1. Continue Consistent Carbohydrate diet.   >>Encourage & monitor PO intake. [place] dietary preferences as able.   2. Monitor GI tolerance, weight trends, labs, & skin integrity.  3. Defer bowel and pain regimens to team.   4. RD to remain available for diet education/intervention prn.

## 2024-11-12 NOTE — H&P ADULT - PROBLEM SELECTOR PLAN 6
Follows Dr. Delaney at [facility]. receiving chemo and radiation therapy.  - f/u heme onc recs

## 2024-11-12 NOTE — PATIENT PROFILE ADULT - FALL HARM RISK - HARM RISK INTERVENTIONS

## 2024-11-12 NOTE — H&P ADULT - PROBLEM SELECTOR PLAN 10
Fluids: none  Electrolytes: Mg >2, K >4  Nutrition: consistent carb  Prophylaxis: lovenox  Activity: fall risk  GI: none  C: FC  Dispo: NESTOR w/ age corrected, wnl. Also possible elevated iso RA

## 2024-11-12 NOTE — H&P ADULT - PROBLEM SELECTOR PLAN 7
Last admission, nifedipine ER 60mg. But claims that she takes a medication that starts with "X"  - ctm BPs  - hold BP meds iso hypotension on arrival

## 2024-11-12 NOTE — OCCUPATIONAL THERAPY INITIAL EVALUATION ADULT - MODALITIES TREATMENT COMMENTS
Unable to formally assess cranial nerves 2/2 impaired cognition with decreased ability to follow commands. Eye open/close intact, face symmetrical, head turn/shoulder shrug intact b/l, and able to track to all quadrants. // Pt able to perform bed mobility with 2 person assist, demo impaired motor planning/sequencing and decreased BUE/BLE/trunk strength. Pt performed UB/LB dressing while sitting EOB, requiring 1 person assist 2/2 impaired cognition (initiation, sequencing), impaired dynamic balance, and decreased functional reach. Pt performed sit<->stand and ambulation in room, requiring 2 person assist with use of rollator, demo impaired balance with decreased step length and impaired ability to weight shift 2/2 decreased BLE strength, and impaired overall motor planning/sequencing of steps. Pt returned to bed and left as found, +all lines, +call bell, +bed alarm, NADSOSA made aware. Unable to formally assess cranial nerves 2/2 impaired cognition with decreased ability to follow commands. Eye open/close intact, face symmetrical, head turn/shoulder shrug intact b/l, and able to track to all quadrants. // Pt able to perform bed mobility with 1 person assist, demo impaired motor planning/sequencing and decreased BUE/BLE/trunk strength. Pt performed UB/LB dressing while sitting EOB, requiring 1 person assist 2/2 impaired cognition (initiation, sequencing), impaired dynamic balance, and decreased functional reach. Pt performed sit<->stand and ambulation in room, requiring 1 person assist with use of rollator, demo impaired balance with decreased step length and impaired ability to weight shift 2/2 decreased BLE strength, and impaired overall motor planning/sequencing of steps. Pt returned to bed and left as found, +all lines, +call bell, +bed alarm, NADSOSA made aware.

## 2024-11-12 NOTE — PHYSICAL THERAPY INITIAL EVALUATION ADULT - MODALITIES TREATMENT COMMENTS
CN assessment is limited due to decreased commands following. CN Testing: smile symmetrical; tongue protrusion at midline; B/L eyes open/close intact; Shoulder elevation: intact bilaterally; Vision H-Test: bilateral tracking and smooth pursuit intact;

## 2024-11-12 NOTE — PROGRESS NOTE ADULT - SUBJECTIVE AND OBJECTIVE BOX
INTERVAL HPI/OVERNIGHT EVENTS:  Patient well known to me;  Has missed multiple appointments  Getting chemo  Patient awake and alert and knew me  She states she is taking steroids and will check outpatient Rheum records   Glucose issues has been an issue; Never sure she is taking insulin correctly      MEDICATIONS  (STANDING):  atorvastatin 40 milliGRAM(s) Oral at bedtime  dextrose 5%. 1000 milliLiter(s) (50 mL/Hr) IV Continuous <Continuous>  dextrose 5%. 1000 milliLiter(s) (100 mL/Hr) IV Continuous <Continuous>  dextrose 50% Injectable 25 Gram(s) IV Push once  dextrose 50% Injectable 12.5 Gram(s) IV Push once  dextrose 50% Injectable 25 Gram(s) IV Push once  glucagon  Injectable 1 milliGRAM(s) IntraMuscular once  heparin   Injectable 5000 Unit(s) SubCutaneous every 8 hours  insulin glargine Injectable (LANTUS) 23 Unit(s) SubCutaneous at bedtime  insulin lispro (ADMELOG) corrective regimen sliding scale   SubCutaneous Before meals and at bedtime  insulin lispro Injectable (ADMELOG) 14 Unit(s) SubCutaneous three times a day before meals    MEDICATIONS  (PRN):  dextrose Oral Gel 15 Gram(s) Oral once PRN Blood Glucose LESS THAN 70 milliGRAM(s)/deciliter      Allergies    citrus (Urticaria)  Bactrim (Rash)    Intolerances        Vital Signs Last 24 Hrs  T(C): 36.7 (2024 08:45), Max: 36.8 (2024 18:36)  T(F): 98 (2024 08:45), Max: 98.3 (2024 18:36)  HR: 71 (2024 08:45) (70 - 101)  BP: 170/92 (2024 08:45) (102/77 - 178/93)  BP(mean): 94 (2024 00:21) (94 - 94)  RR: 18 (2024 08:45) (16 - 20)  SpO2: 98% (2024 08:45) (95% - 99%)    Parameters below as of 2024 08:45  Patient On (Oxygen Delivery Method): room air              Constitutional: Awake     Eyes: NEDRA    ENMT: Negative    Neck: Supple    Back:  no tenderness     Respiratory:  clear     Cardiovascular: S1 S2    Gastrointestinal: soft     Genitourinary:    Extremities: no edema     Vascular:    Neurological:    Skin:    Lymph Nodes:            LABS:                        12.4   12.13 )-----------( 214      ( 2024 18:27 )             38.7         135  |  96  |  32[H]  ----------------------------<  345[H]  4.3   |  27  |  1.90[H]    Ca    9.2      2024 18:27        Urinalysis Basic - ( 2024 02:14 )    Color: Yellow / Appearance: Turbid / S.025 / pH: x  Gluc: x / Ketone: Negative mg/dL  / Bili: Negative / Urobili: 0.2 mg/dL   Blood: x / Protein: 300 mg/dL / Nitrite: Negative   Leuk Esterase: Moderate / RBC: 10 /HPF /  /HPF   Sq Epi: x / Non Sq Epi: >36 /HPF / Bacteria: Many /HPF        RADIOLOGY & ADDITIONAL TESTS:

## 2024-11-12 NOTE — OCCUPATIONAL THERAPY INITIAL EVALUATION ADULT - GENERAL OBSERVATIONS, REHAB EVAL
OT IE completed. Orders received, chart reviewed, pt cleared for OT by SOSA Gutierres. Pt received semi supine in bed, NAD, +heplock. Pt A&Ox2 (person, place), agreeable to OT, and tolerated session fairly.

## 2024-11-12 NOTE — OCCUPATIONAL THERAPY INITIAL EVALUATION ADULT - DIAGNOSIS, OT EVAL
Pt p/w impaired cognition, decreased strength, impaired balance, and decreased functional activity tolerance, impacting ability to perform functional mobility/ADLs.

## 2024-11-12 NOTE — DIETITIAN INITIAL EVALUATION ADULT - PROBLEM SELECTOR PLAN 2
Troponin T 88-->93. likely demand ischemia iso hypotension. Denies any CP, chest discomfort, abdominal pain. EKG with NSR and ST elevation in V2 unchanged from prior EKGs  - trend troponin in the AM to peak

## 2024-11-12 NOTE — H&P ADULT - HISTORY OF PRESENT ILLNESS
75 y/o F pmhx lung cancer mets to miguel, CKD, HLD, DM2, RA BIBEMS complaining of AMS. Pt states she woke up today around 1:30pm but felt confused. Per ED note, pt showed up at her infusion center on the wrong appointment day and they reported her acting confused. EMS reports patient was hypotensive on their arrival. Pt cannot recall specific reasons/details leading to her admission. Currently, she denies headache, lightheadedness, dizziness, SOB, CP, diarrhea, constipation, increase in thirst, polyuria, nausea or vomiting.    In the ED  Vitals: T 98.1, HR 73, /77 RR 17 SpO2 97%  Labs: WBC 12.13 D-dimer 449, Trops 88-->93 BUN 32 Cr 1.90  CT head: No acute intracranial abnormality or significant adverse change since 10/03/2024.  Interventions: none   73 y/o F pmhx lung cancer mets to migeul, CKD, HLD, DM2, RA BIBEMS complaining of AMS. Pt states she woke up today around 1:30pm but felt confused. Per ED note, pt showed up at her infusion center on the wrong appointment day and they reported her acting confused. EMS reports patient was hypotensive on their arrival. Pt cannot recall specific reasons/details leading to her admission. Currently, she denies headache, lightheadedness, dizziness, SOB, CP, diarrhea, constipation, increase in thirst, polyuria, nausea or vomiting.    In the ED  Vitals: T 98.1, HR 73, /77 RR 17 SpO2 97%  Labs: WBC 12.13 D-dimer 449, Trops 88-->93 BUN 32 Cr 1.90  CT head: No acute intracranial abnormality or significant adverse change since 10/03/2024  EKG: NSR and ST elevation in V2 unchanged from prior EKGs  Interventions: none

## 2024-11-12 NOTE — H&P ADULT - NSHPPHYSICALEXAM_GEN_ALL_CORE
GENERAL: NAD, lying in bed comfortably  HEAD:  Atraumatic, normocephalic  EYES: EOMI, PERRLA, conjunctiva and sclera clear  NECK: Supple, trachea midline, no JVD  HEART: Regular rate and rhythm, no murmurs, rubs, or gallops  LUNGS: Unlabored respirations.  Clear to auscultation bilaterally, no crackles, wheezing, or rhonchi  ABDOMEN: Soft, nontender, nondistended, +BS  EXTREMITIES: 2+ peripheral pulses bilaterally. No clubbing, cyanosis, or edema  NERVOUS SYSTEM:  A&Ox4, moving all extremities, no focal deficits   SKIN: No rashes or lesions

## 2024-11-12 NOTE — PHYSICAL THERAPY INITIAL EVALUATION ADULT - ADDITIONAL COMMENTS
Pt is a poor historian with decreased commands following.  As Per chart, pt was able to perform all mobility/ADLs independently prior to admission. Pt lives alone in apartment with elevator access. Pt uses rollator for ambulation, and has shower chair with grab bars, as well as a commode. Pt has HHA 7 hours x 3days to assist with IADLs.

## 2024-11-12 NOTE — DIETITIAN INITIAL EVALUATION ADULT - PERTINENT LABORATORY DATA
11-11    135  |  96  |  32[H]  ----------------------------<  345[H]  4.3   |  27  |  1.90[H]    Ca    9.2      11 Nov 2024 18:27    POCT Blood Glucose.: 284 mg/dL (11-12-24 @ 07:39)  A1C with Estimated Average Glucose Result: 12.4 % (07-31-24 @ 05:30)  A1C with Estimated Average Glucose Result: 12.0 % (07-30-24 @ 05:30)  A1C with Estimated Average Glucose Result: 11.5 % (12-27-23 @ 05:30)

## 2024-11-12 NOTE — PHYSICAL THERAPY INITIAL EVALUATION ADULT - LEVEL OF INDEPENDENCE: SIT/SUPINE, REHAB EVAL
Pt states he was hospitalized on 10/30 for cardiac stent placement. Pt was discharge 2 days later and developed cough that same day. Today pt reports increased cough and SOB. Pt started on doxy yesterday by Dr. Kaelyn Ayers.
moderate assist (50% patients effort)

## 2024-11-12 NOTE — DISCHARGE NOTE PROVIDER - CARE PROVIDER_API CALL
Marissa Douglass J  Critical Care Medicine  [address] [address]  Phone: [phone]  Fax: [phone]  Follow Up Time: 2 weeks

## 2024-11-12 NOTE — DISCHARGE NOTE PROVIDER - NSDCCPCAREPLAN_GEN_ALL_CORE_FT
PRINCIPAL DISCHARGE DIAGNOSIS  Diagnosis: AMS (altered mental status)  Assessment and Plan of Treatment: Hypotension, also known as low blood pressure, is a condition where the pressure of circulating blood is lower than normal. It's usually not a problem unless it causes symptoms or negatively impacts the body.     PRINCIPAL DISCHARGE DIAGNOSIS  Diagnosis: AMS (altered mental status)  Assessment and Plan of Treatment: Hypotension, also known as low blood pressure, is a condition where the pressure of circulating blood is lower than normal. It's usually not a problem unless it causes symptoms or negatively impacts the body. We believe your presentation to the hospital was caused by having low blood pressure. We gave you fluids, which helped with your blood pressures. We also held your nifedipine, which helped keep your pressures up. Please be sure to follow up with Dr. Douglass about restarting this medication.      SECONDARY DISCHARGE DIAGNOSES  Diagnosis: Diabetes mellitus  Assessment and Plan of Treatment: You have a known history of diabetes mellitus prior to your admission. This condition results from blood sugar levels getting too high because your body is more resistant to insulin. Uncontrolled blood sugar levels can lead to kidney and heart damage, pain/numbness/paralysis in your hands and feet, and increased rates of infections. We checked your A1c while admitted, which was 10.1%. It is very important that you take your medication as prescribed. Please take..... Additionally be sure to follow up with your primary care physician, podiatrist, and ophthalmologist on a regular basis.     PRINCIPAL DISCHARGE DIAGNOSIS  Diagnosis: AMS (altered mental status)  Assessment and Plan of Treatment: Hypotension, also known as low blood pressure, is a condition where the pressure of circulating blood is lower than normal. It's usually not a problem unless it causes symptoms or negatively impacts the body. We believe your presentation to the hospital was caused by having low blood pressure. We gave you fluids, which helped with your blood pressures. We also stopped your nifedpine and started a new medication called amlodipine. Please take this 1x/day. Please be sure to follow up with Dr. Douglass about controlling your blood pressure.      SECONDARY DISCHARGE DIAGNOSES  Diagnosis: Diabetes mellitus  Assessment and Plan of Treatment: You have a known history of diabetes mellitus prior to your admission. This condition results from blood sugar levels getting too high because your body is more resistant to insulin. Uncontrolled blood sugar levels can lead to kidney and heart damage, pain/numbness/paralysis in your hands and feet, and increased rates of infections. We checked your A1c while admitted, which was 10.5%. It is very important that you take your medication as prescribed. Please take lantus 28 units at bedtime and admelog 22 units before meals. You should also be periodically checking your blood sugar. Additionally be sure to follow up with your primary care physician, podiatrist, and ophthalmologist on a regular basis.     PRINCIPAL DISCHARGE DIAGNOSIS  Diagnosis: AMS (altered mental status)  Assessment and Plan of Treatment: Hypotension, also known as low blood pressure, is a condition where the pressure of circulating blood is lower than normal. It's usually not a problem unless it causes symptoms or negatively impacts the body. We believe your presentation to the hospital was caused by having low blood pressure. We gave you fluids, which helped with your blood pressures. We also stopped your nifedpine and started a new medication called amlodipine 10mg. Please take this 1x/day. Please be sure to follow up with Dr. Douglass about controlling your blood pressure.      SECONDARY DISCHARGE DIAGNOSES  Diagnosis: Diabetes mellitus  Assessment and Plan of Treatment: You have a known history of diabetes mellitus prior to your admission. This condition results from blood sugar levels getting too high because your body is more resistant to insulin. Uncontrolled blood sugar levels can lead to kidney and heart damage, pain/numbness/paralysis in your hands and feet, and increased rates of infections. We checked your A1c while admitted, which was 10.5%. It is very important that you take your medication as prescribed. Please take lantus 26 units at bedtime and admelog 14 units before meals. You should also be periodically checking your blood sugar. Additionally be sure to follow up with your primary care physician, podiatrist, and ophthalmologist on a regular basis.

## 2024-11-12 NOTE — CONSULT NOTE ADULT - ASSESSMENT
{\rtf1\jfabuz57998\ansi\tkoaayv1243\ftnbj\uc1\deff0  {\fonttbl{\f0 \fnil Segoe UI;}{\f1 \fnil \fcharset0 Segoe UI;}{\f2 \fnil Times New Aydin;}}  {\colortbl ;\mau742\mvutx778\xtzn823 ;\red0\green0\blue0 ;\red0\green0\klpd684 ;\red0\green0\blue0 ;}  {\stylesheet{\f0\fs20 Normal;}{\cs1 Default Paragraph Font;}{\cs2\f0\fs16 Line Number;}{\cs3\f2\fs24\ul\cf3 Hyperlink;}}  {\*\revtbl{Unknown;}}  \tknflm27489\huixeb88731\zwwtl4491\twnks7184\bbusx6392\jcwsn0279\jazdcxy208\istqsdb328\nogrowautofit\xbnkwx173\formshade\nofeaturethrottle1\dntblnsbdb\fet4\aendnotes\aftnnrlc\pgbrdrhead\pgbrdrfoot  \sectd\qkqgem24742\ozkvoo27896\guttersxn0\kppqjyzk0217\znjpiubr8859\tmlwryfz8663\gtkortml4293\fbfqatb748\myhrnhw627\sbkpage\pgncont\pgndec  \plain\plain\f0\fs24\ql\plain\f0\fs24\plain\f0\fs20\ohvd1950\hich\f0\dbch\f0\loch\f0\fs20\par  I M\par  \par  74 y o F pmhx lung cancer mets to brain, CKD, HLD, DM2, RA BIBEMS for AMS iso hypotension.\par  \par  \plain\f1\fs20\fppf4429\hich\f1\dbch\f1\loch\f1\cf2\fs20\ul{\field{\*\fldinst HYPERLINK [id number] }{\fldrslt Problem/Plan - 1:}}\plain\f0\fs20\jvvc1952\hich\f0\dbch\f0\loch\f0\fs20\ql\par  \'b7  {\*\bkmkstart sp94670908500}{\*\bkmkend tn50633149532}Problem: {\*\bkmkstart wf02792946007}{\*\bkmkend cw50305810235}Altered mental status. \par  \'b7  {\*\bkmkstart ii93668130014}{\*\bkmkend ax65880146967}Plan: {\*\bkmkstart kw74037960021}{\*\bkmkend xt55042997672}Possibly iso hypotension due to taking BP meds. Pt does not recall the \par  CTH with no acute intracranial abnormality of significant changes since 10/03/2024. Noted to have similiar AMS on prior admissions. RESOLVED, currently AO4\par  - f/u orthostatics\par  - PT/OT consult.\par  \par  \plain\f1\fs20\cuvs7405\hich\f1\dbch\f1\loch\f1\cf2\fs20\ul{\field{\*\fldinst HYPERLINK [id number] }{\fldrslt Problem/Plan - 2:}}\plain\f0\fs20\jhmj3502\hich\f0\dbch\f0\loch\f0\fs20\ql\par  \'b7  {\*\bkmkstart qd34859253714}{\*\bkmkend ny75861526685}Problem: {\*\bkmkstart yf56956034771}{\*\bkmkend nu09683841362}Elevated troponin. \par  \'b7  {\*\bkmkstart of54280549080}{\*\bkmkend zb85027363399}Plan: {\*\bkmkstart zo48661724984}{\*\bkmkend uq84473370451}Troponin T 88-->93. likely demand ischemia iso hypotension. Denies any CP, chest discomfort, abdominal pain. EKG with NSR and ST elevation   in V2 unchanged from prior EKGs\par  - trend troponin in the AM to peak.\par  \par  \plain\f1\fs20\dtoz6297\hich\f1\dbch\f1\loch\f1\cf2\fs20\ul{\field{\*\fldinst HYPERLINK [id number] }{\fldrslt Problem/Plan - 3:}}\plain\f0\fs20\xyqy7299\hich\f0\dbch\f0\loch\f0\fs20\ql\par  \'b7  {\*\bkmkstart fs56105434717}{\*\bkmkend xr93828361563}Problem: {\*\bkmkstart ux40006763171}{\*\bkmkend zy51331531424}Hyperglycemia. \par  \'b7  {\*\bkmkstart tf49336597658}{\*\bkmkend ec61667064576}Plan: {\*\bkmkstart ql87884587518}{\*\bkmkend lh48415447378}Home regiment: lantus 23U when not eating, admelog 14U for regular meal.\par  - c/w home insulin regimen.\par  \par  \plain\f1\fs20\tsbz2513\hich\f1\dbch\f1\loch\f1\cf2\fs20\ul{\field{\*\fldinst HYPERLINK [id number] }{\fldrslt Problem/Plan - 4:}}\plain\f0\fs20\phxh5956\hich\f0\dbch\f0\loch\f0\fs20\ql\par  \'b7  {\*\bkmkstart kq63395210891}{\*\bkmkend mb51329704889}Problem: {\*\bkmkstart oq52359127609}{\*\bkmkend yq93763260128}Diabetes mellitus. \par  \'b7  {\*\bkmkstart by63023355959}{\*\bkmkend bj72070015837}Plan: {\*\bkmkstart au42017262033}{\*\bkmkend rb61570719350}Poorly controlled T2DM, most recent A1C 7/31/24 12.4. likely d/t med noncompliance iso cognitive problems, metastatic lung cancer to   brain\par  - diabetes management as above\par  - f/u A1c.\par  \par  \plain\f1\fs20\nyww8632\hich\f1\dbch\f1\loch\f1\cf2\fs20\ul{\field{\*\fldinst HYPERLINK [id number] }{\fldrslt Problem/Plan - 5:}}\plain\f0\fs20\fbes9626\hich\f0\dbch\f0\loch\f0\fs20\ql\par  \'b7  {\*\bkmkstart cq33243297567}{\*\bkmkend qr35697393148}Problem: {\*\bkmkstart pp52343669152}{\*\bkmkend cs62487268095}Acute kidney injury superimposed on CKD. \par  \'b7  {\*\bkmkstart yx60887073109}{\*\bkmkend qs76816283182}Plan: {\*\bkmkstart cu15500534282}{\*\bkmkend lw14823882241}Baseline Cr 1.56. On admission 1.90\par  - avoid nephrotoxic agent outpatient\par  - f/u urine studies\par  - f/u urine lytes.\par  \par  \plain\f1\fs20\qhcm4942\hich\f1\dbch\f1\loch\f1\cf2\fs20\ul{\field{\*\fldinst HYPERLINK [id number] }{\fldrslt Problem/Plan - 6:}}\plain\f0\fs20\uoic8225\hich\f0\dbch\f0\loch\f0\fs20\ql\par  \'b7  {\*\bkmkstart aa04269066741}{\*\bkmkend fl39100798492}Problem: {\*\bkmkstart nw90270914476}{\*\bkmkend li30184261678}NSCLC metastatic to brain. \par  \'b7  {\*\bkmkstart yz71162940206}{\*\bkmkend yn60763588581}Plan: {\*\bkmkstart yi50892389345}{\*\bkmkend fd19998774335}Follows Dr. Delaney at [facility]. receiving chemo and radiation therapy.\par  - f/u heme onc recs.\par  \par  \plain\f1\fs20\rezq7507\hich\f1\dbch\f1\loch\f1\cf2\fs20\ul{\field{\*\fldinst HYPERLINK [id number] }{\fldrslt Problem/Plan - 7:}}\plain\f0\fs20\nkmm1741\hich\f0\dbch\f0\loch\f0\fs20\ql\par  \'b7  {\*\bkmkstart nz52683429290}{\*\bkmkend mu62163796009}Problem: {\*\bkmkstart qa39469781766}{\*\bkmkend hj89056731137}Hypertension. \par  \'b7  {\*\bkmkstart zt37679202907}{\*\bkmkend xv03475931018}Plan: {\*\bkmkstart vb74777897347}{\*\bkmkend sf92259135091}Last admission, nifedipine ER 60mg. But claims that she takes a medication that starts with "X"\par  - ctm BPs\par  - hold BP meds iso hypotension on arrival.\par  \par  \plain\f1\fs20\njua3553\hich\f1\dbch\f1\loch\f1\cf2\fs20\ul{\field{\*\fldinst HYPERLINK [id number] }{\fldrslt Problem/Plan - 8:}}\plain\f0\fs20\ynlb1497\hich\f0\dbch\f0\loch\f0\fs20\ql\par  \'b7  {\*\bkmkstart kc78537929623}{\*\bkmkend nd48847343686}Problem: {\*\bkmkstart is27552168976}{\*\bkmkend zj71946348234}Hyperlipidemia. \par  \'b7  {\*\bkmkstart fa57151354229}{\*\bkmkend sb00816839015}Plan: {\*\bkmkstart kf56435014883}{\*\bkmkend aw79772738886}Home atorvastatin 40mg qD.\par  - c/w home meds.\par  \par  \plain\f1\fs20\ilwe7769\hich\f1\dbch\f1\loch\f1\cf2\fs20\ul{\field{\*\fldinst HYPERLINK [id number] }{\fldrslt Problem/Plan - 9:}}\plain\f0\fs20\shhr5528\hich\f0\dbch\f0\loch\f0\fs20\ql\par  \'b7  {\*\bkmkstart ul55703908987}{\*\bkmkend rr77809353595}Problem: {\*\bkmkstart vm08947360864}{\*\bkmkend rd35597880702}Rheumatoid arthritis. \par  \'b7  {\*\bkmkstart ol52118754337}{\*\bkmkend fn14899458720}Plan: {\*\bkmkstart mu61853440100}{\*\bkmkend rd86753881902}Hx of chronic RA, doesn't take home meds for it\par  - f/u outpatient PCP.\par  \par  \plain\f1\fs20\efrz2779\hich\f1\dbch\f1\loch\f1\cf2\fs20\ul{\field{\*\fldinst HYPERLINK [id number] }{\fldrslt Problem/Plan - 10:}}\plain\f0\fs20\ghnj3648\hich\f0\dbch\f0\loch\f0\fs20\ql\par  \'b7  {\*\bkmkstart jw71953080636}{\*\bkmkend oq42988601422}Problem: {\*\bkmkstart lp65896423871}{\*\bkmkend vk32486967041}Elevated d-dimer.\plain\f1\fs20\sfom1510\hich\f1\dbch\f1\loch\f1\cf2\fs20\strike\plain\f0\fs20\wgdd3735\hich\f0\dbch\f0\loch\f0\fs20\par  \'b7  {\*\bkmkstart zp34364809549}{\*\bkmkend zc66355240105}Plan; {\*\bkmkstart yk30007967713}{\*\bkmkend dj02675409818}w/ age corrected, wnl. Also possible elevated iso RA.\plain\f1\fs20\aqfb7105\hich\f1\dbch\f1\loch\f1\cf2\fs20\strike\plain\f0\fs20\gmts5643\hich\f0\dbch\f0\loch\f0\fs20\par  \par  \plain\f1\fs20\ggkz8941\hich\f1\dbch\f1\loch\f1\cf2\fs20\ul{\field{\*\fldinst HYPERLINK [id number] }{\fldrslt Problem/Plan - 11:}}\plain\f0\fs20\xhed1141\hich\f0\dbch\f0\loch\f0\fs20\ql\par  \'b7  {\*\bkmkstart vt882048228019}{\*\bkmkend hy088383564114}Problem: {\*\bkmkstart ps531347109596}{\*\bkmkend ge765686807929}Prophylactic measure. \par  \'b7  {\*\bkmkstart zx921252679110}{\*\bkmkend vp698831503472}Plan: {\*\bkmkstart kr597151078911}{\*\bkmkend ki498635575241}Fluids: none\par  Electrolytes: Mg >2, K >4\par  Nutrition: consistent carb\par  Prophylaxis: lovenox\par  Activity: fall risk\par  GI: none\par  C: FC\par  Dispo: [facility]{\*\bkmkstart bkcommentCR}{\*\bkmkend bkcommentCR}.\par  \par  }  
75 yo F with metastatic NSCLC with brain mets here after she came to my office yesterday on the wrong day for the third time, with low BP, sent for further evaluation.    I worry about the patient's safety at home and ability to care for self, given her forgetfullness. It is unclear whether she is taking her meds properly.  I strongly recommend SW/CM involvement to get more horus from HHA. She would benefit from at least 8-12 hrs per day of HHA, at least 5 days per week.  Her confusion has been worsening over time, and patient has been refusing placement into an [facility] or nursing home.  At this time she has not gotten chemotherapy for several weeks, due to her intermittent forgetfullness   She should at some point restart her treatment, however at this time a safe placement or at least placement home with the proper services takes precendence

## 2024-11-12 NOTE — OCCUPATIONAL THERAPY INITIAL EVALUATION ADULT - PLANNED THERAPY INTERVENTIONS, OT EVAL
ADL retraining/balance training/bed mobility training/cognitive, visual perceptual/motor coordination training/neuromuscular re-education/ROM/strengthening

## 2024-11-12 NOTE — H&P ADULT - PROBLEM SELECTOR PLAN 11
[address]  [address] [address]  Phone: [phone]  Fax: [phone]    Mykel Hayes MD        November 24, 2021     Patient: Guy Murphy   YOB: 1962   Date of Visit: 11/24/2021       To Whom It May Concern: It is my medical opinion that Mina Reyes {Work release (duty restriction):34404}. If you have any questions or concerns, please don't hesitate to call.     Sincerely,        Mykel Hayes MD Fluids: none  Electrolytes: Mg >2, K >4  Nutrition: consistent carb  Prophylaxis: lovenox  Activity: fall risk  GI: none  C: FC  Dispo: [facility]

## 2024-11-12 NOTE — PHYSICAL THERAPY INITIAL EVALUATION ADULT - GENERAL OBSERVATIONS, REHAB EVAL
Pt received semi supine in bed with +hep-lock, +bed alarm, NAD, OT Huseyin present. Pt left as found, NAD, call bell in reach, +bed alarm, SOSA Gutierres  made aware.

## 2024-11-12 NOTE — DIETITIAN INITIAL EVALUATION ADULT - PERSON TAUGHT/METHOD
RD attempted education on nutrition therapy for blood glucose control however ?pt comprehension in setting of cognitive status as pt unable to provide evidence of understanding/alignment. Pt aware RD remains available for additional questions/concerns./verbal instruction/patient instructed

## 2024-11-12 NOTE — PHYSICAL THERAPY INITIAL EVALUATION ADULT - SIT-TO-STAND BALANCE
Report called to [facility] on [place] [address]. Chart reviewed. Discussed patient's history, medications and plan of care.        Pt transported to [place] [address] -  61 bed 1 at 2255. RN and tech at bedside. All belongings transported with patient.    poor plus

## 2024-11-12 NOTE — DISCHARGE NOTE PROVIDER - NSDCMRMEDTOKEN_GEN_ALL_CORE_FT
Admelog 100 units/mL injectable solution: 14 unit(s) injectable 3 times a day (before meals)  atorvastatin 40 mg oral tablet: 1 tab(s) orally once a day (at bedtime)  cefpodoxime 100 mg oral tablet: 1 tab(s) orally 2 times a day  Eliquis Starter Pack for Treatment of DVT and PE 5 mg oral tablet: 1 kit orally every 12 hours Take 2 tablets every 12 hours for 7 days until August 8th. Starting August 9th, take 1 tablet every 12 hours for 21 days.  Lantus 100 units/mL subcutaneous solution: 25 unit(s) subcutaneous once a day (at bedtime)  NIFEdipine 60 mg oral tablet, extended release: 1 tab(s) orally every 24 hours   Admelog 100 units/mL injectable solution: 14 unit(s) injectable 3 times a day (before meals)  atorvastatin 40 mg oral tablet: 1 tab(s) orally once a day (at bedtime)  Lantus 100 units/mL subcutaneous solution: 23 unit(s) subcutaneous once a day (at bedtime)  NIFEdipine 60 mg oral tablet, extended release: 1 tab(s) orally every 24 hours   amLODIPine 5 mg oral tablet: 1 tab(s) orally every 24 hours  atorvastatin 40 mg oral tablet: 1 tab(s) orally once a day (at bedtime)  insulin glargine 100 units/mL subcutaneous solution: 28 unit(s) subcutaneous once a day (at bedtime)  insulin lispro 100 units/mL injectable solution: 22 injectable 3 times a day (before meals)   acetaminophen 325 mg oral tablet: 2 tab(s) orally every 6 hours As needed Temp greater or equal to 38C (100.4F), Mild Pain (1 - 3)  amLODIPine 10 mg oral tablet: 1 tab(s) orally every 24 hours  atorvastatin 40 mg oral tablet: 1 tab(s) orally once a day (at bedtime)  insulin glargine 100 units/mL subcutaneous solution: 26 unit(s) subcutaneous once a day (at bedtime)  insulin lispro 100 units/mL injectable solution: 14 unit(s) subcutaneous 3 times a day (before meals)

## 2024-11-12 NOTE — PROGRESS NOTE ADULT - TIME BILLING
Patient seen and examined ;  Will check outpatient medication   Unclear whether she is taking steroids   Glucose control  BP stable now

## 2024-11-12 NOTE — H&P ADULT - PROBLEM SELECTOR PLAN 3
Home regiment: lantus 23U when not eating, admelog 14U for regular meal.  - c/w home insulin regimen

## 2024-11-12 NOTE — OCCUPATIONAL THERAPY INITIAL EVALUATION ADULT - MANUAL MUSCLE TESTING RESULTS, REHAB EVAL
-- DO NOT REPLY / DO NOT REPLY ALL --  -- Message is from the [facility]--    COVID-19 Universal Screening: N/A - Not about scheduling    General Patient Message      Reason for Call: pt is calling regarding phone number given by  for physical therapy [phone] and states its the wrong type of service and needs the correct one    Caller Information       Type Contact Phone    07/09/2021 04:30 PM CDT Phone (Incoming) Jan Lang (Self) [phone] (H)          Alternative phone number: none        Did the caller agree that this message can wait until the office reopens on Monday (or after the holiday)? YES - The Message Can Wait      Send a message to the provider's clinical support pool.     Turnaround time given to caller:   \"This message will be sent to [state Provider's name]. The clinical team will fulfill your request as soon as they review your message when the office opens on Monday (or after the holiday).\"      
Left detailed message with correct number for central scheduling.  
Grossly assessed during functional mobility against gravity with pt presenting with 3+/5 or greater BUE/BLE strength

## 2024-11-12 NOTE — H&P ADULT - PROBLEM SELECTOR PLAN 4
Poorly controlled T2DM, most recent A1C 7/31/24 12.4. likely d/t med noncompliance iso cognitive problems, metastatic lung cancer to brain  - diabetes management as above  - f/u A1c

## 2024-11-12 NOTE — CONSULT NOTE ADULT - SUBJECTIVE AND OBJECTIVE BOX
Patient is a 74y old  Female who presents with a chief complaint of AMS (2024 01:41)      HPI:  73 y/o F pmhx lung cancer mets to miguel, CKD, HLD, DM2, RA BIBEMS complaining of AMS. Pt states she woke up today around 1:30pm but felt confused. Per ED note, pt showed up at her infusion center on the wrong appointment day and they reported her acting confused. EMS reports patient was hypotensive on their arrival. Pt cannot recall specific reasons/details leading to her admission. Currently, she denies headache, lightheadedness, dizziness, SOB, CP, diarrhea, constipation, increase in thirst, polyuria, nausea or vomiting.    In the ED  Vitals: T 98.1, HR 73, /77 RR 17 SpO2 97%  Labs: WBC 12.13 D-dimer 449, Trops 88-->93 BUN 32 Cr 1.90  CT head: No acute intracranial abnormality or significant adverse change since 10/03/2024  EKG: NSR and ST elevation in V2 unchanged from prior EKGs  Interventions: none   (2024 01:41)    PAST MEDICAL & SURGICAL HISTORY:  HTN (hypertension)      HLD (hyperlipidemia)      DM (diabetes mellitus), type 2      Rheumatoid arthritis      Degenerative joint disease (DJD) of lumbar spine      Lung cancer metastatic to brain      Stage 3 chronic kidney disease      S/P total right hip arthroplasty        MEDICATIONS  (STANDING):  atorvastatin 40 milliGRAM(s) Oral at bedtime  dextrose 5%. 1000 milliLiter(s) (50 mL/Hr) IV Continuous <Continuous>  dextrose 5%. 1000 milliLiter(s) (100 mL/Hr) IV Continuous <Continuous>  dextrose 50% Injectable 25 Gram(s) IV Push once  dextrose 50% Injectable 12.5 Gram(s) IV Push once  dextrose 50% Injectable 25 Gram(s) IV Push once  enoxaparin Injectable 30 milliGRAM(s) SubCutaneous every 24 hours  glucagon  Injectable 1 milliGRAM(s) IntraMuscular once  insulin glargine Injectable (LANTUS) 23 Unit(s) SubCutaneous at bedtime  insulin lispro (ADMELOG) corrective regimen sliding scale   SubCutaneous Before meals and at bedtime  insulin lispro Injectable (ADMELOG) 14 Unit(s) SubCutaneous three times a day before meals    MEDICATIONS  (PRN):  dextrose Oral Gel 15 Gram(s) Oral once PRN Blood Glucose LESS THAN 70 milliGRAM(s)/deciliter          FAMILY HISTORY:  No pertinent family history in first degree relatives        CBC Full  -  ( 2024 18:27 )  WBC Count : 12.13 K/uL  RBC Count : 4.25 M/uL  Hemoglobin : 12.4 g/dL  Hematocrit : 38.7 %  Platelet Count - Automated : 214 K/uL  Mean Cell Volume : 91.1 fl  Mean Cell Hemoglobin : 29.2 pg  Mean Cell Hemoglobin Concentration : 32.0 g/dL  Auto Neutrophil # : 10.00 K/uL  Auto Lymphocyte # : 1.18 K/uL  Auto Monocyte # : 0.72 K/uL  Auto Eosinophil # : 0.07 K/uL  Auto Basophil # : 0.07 K/uL  Auto Neutrophil % : 82.5 %  Auto Lymphocyte % : 9.7 %  Auto Monocyte % : 5.9 %  Auto Eosinophil % : 0.6 %  Auto Basophil % : 0.6 %          135  |  96  |  32[H]  ----------------------------<  345[H]  4.3   |  27  |  1.90[H]    Ca    9.2      2024 18:27        Urinalysis Basic - ( 2024 02:14 )    Color: Yellow / Appearance: Turbid / S.025 / pH: x  Gluc: x / Ketone: Negative mg/dL  / Bili: Negative / Urobili: 0.2 mg/dL   Blood: x / Protein: 300 mg/dL / Nitrite: Negative   Leuk Esterase: Moderate / RBC: 10 /HPF /  /HPF   Sq Epi: x / Non Sq Epi: >36 /HPF / Bacteria: Many /HPF        Radiology :     < from: Xray Chest 1 View AP/PA (24 @ 19:54) >    ACC: [id number] EXAM:  XR CHEST AP OR PA 1V   ORDERED BY: FRANK DIAZ     PROCEDURE DATE:  2024          INTERPRETATION:  Chest one view    HISTORY: Altered mental status    IMPRESSION:    No infiltrate consolidation pleural effusion or pneumothorax.   Unremarkable cardiac silhouette. No acute bone abnormality. Right venous   catheter tip at cavoatrial junction.      < from: CT Head No Cont (24 @ 19:48) >  ACC: [id number] EXAM:  CT BRAIN   ORDERED BY: FRANK DIAZ     PROCEDURE DATE:  2024          INTERPRETATION:  CT HEAD WITHOUT CONTRAST    TECHNIQUE: Multiple axial images of the head were obtained from the skull   base to the vertex without intravenous contrast.  Multiplanar reformats   were created according to the standard protocol.    INDICATION: ams    COMPARISON: 10/03/2024 MR Brain without and with contrast. 2024 CT   Head.  _____________________    FINDINGS:    Of note, the absence of intravenous contrast degrades assessment for   small parenchymal enhancing lesions. These are better evaluated on the   prior 10/03/2024 MR brain without and with contrast.    VENTRICLES AND SULCI: Age appropriate involutional changes.  INTRA-AXIAL: No intracranial hemorrhage, mass effect or midline shift.    Periventricular and subcortical white matter hypodensities, consistent   with microvascular type changes and similar to prior exam. Chronic right   medial occipital infarct. Chronic lacunar infarcts in the left globus   pallidus, left caudate, and inferior left cerebellum, unchanged.  EXTRA-AXIAL: No mass or fluid collection. Basal cisterns are normal in   appearance.    VISUALIZED SINUSES: Clear.  TYMPANOMASTOID CAVITIES:  Clear.  VISUALIZED ORBITS: Bilateral lens replacement. 7Right orbital floor   deformity is not well seen on this exam.  CALVARIUM: Intact. No suspicious osseous lesion.  ______________________    IMPRESSION:    No acute intracranial abnormality or significant adverse change since   10/03/2024.         Review of Systems : per HPI         Vital Signs Last 24 Hrs  T(C): 36.3 (2024 06:19), Max: 36.8 (2024 18:36)  T(F): 97.4 (2024 06:19), Max: 98.3 (2024 18:36)  HR: 101 (2024 06:19) (70 - 101)  BP: 178/93 (2024 06:19) (102/77 - 178/93)  BP(mean): 94 (2024 00:21) (94 - 94)  RR: 20 (2024 06:19) (16 - 20)  SpO2: 95% (2024 06:19) (95% - 99%)    Parameters below as of 2024 06:19  Patient On (Oxygen Delivery Method): room air            Physical Exam:  on CONTACT VRE urine isolation ,  74 y o woman lying comfortably in semi Zee's position , awake ,  NAD    Head: normocephalic , atraumatic    Eyes: PERRLA , EOMI , no nystagmus , sclera anicteric    ENT / FACE: neg nasal discharge , uvula midline , no oropharyngeal erythema / exudate    Neck: supple , negative JVD , negative carotid bruits , no thyromegaly    Chest: CTA bilaterally      Cardiovascular: regular rate and rhythm , neg murmurs / rubs / gallops    Abdomen: soft , non distended , no tenderness to palpation in all 4 quadrants ,  normal bowel sounds     Extremities: WWP , neg cyanosis /clubbing / edema       Neurologic Exam:     Alert and oriented to person , place , date/year , speech fluent w/o dysarthria , follows commands , recent and remote memory intact , repetition intact , comprehension intact ,  attention/concentration intact , fund of knowledge appropriate    Cranial Nerves:           II:                         pupils equal , round and reactive to light , visual fields intact         III/ IV/VI:             extraocular movements intact , neg nystagmus , neg ptosis        V:                        facial sensation intact , V1-3 normal        VII:                      face symmetric , no droop , normal eye closure and smile        VIII:                     hearing intact to finger rub bilaterally        IX and X:             no hoarseness , gag intact , palate/ uvula rise symmetrically        XI:                       SCM / trapezius strength intact bilateral        XII:                      no tongue deviation    Motor Exam:        > 3+/5 x 4 extremities , without drift     Sensation:         intact to light touch x 4 extremities                            no neglect or extinction on double simultaneous testing    DTR:           biceps/brachioradialis: equal                            patella/ankle: equal          neg Babinski      Coordination:            Finger to Nose:  neg dysmetria bilaterally        Gait:  not tested         PM&R Impression: admitted for AMS    - no acute pathology on CT brain imaging     - deconditioned       Recommendations / Plan:       1) Physical / Occupational therapy focusing on therapeutic exercises , equipment evaluation , bed mobility/transfer out of bed evaluation , progressive ambulation with assistive devices prn .    2) Current disposition plan recommendation:    pending functional progress     
73 y/o F pmhx lung cancer mets to miguel, CKD, HLD, DM2, RA BIBEMS complaining of AMS. Pt states she woke up today around 1:30pm but felt confused. Per ED note, pt showed up at her infusion center on the wrong appointment day and they reported her acting confused. EMS reports patient was hypotensive on their arrival. Pt cannot recall specific reasons/details leading to her admission. Currently, she denies headache, lightheadedness, dizziness, SOB, CP, diarrhea, constipation, increase in thirst, polyuria, nausea or vomiting.    In the ED  Vitals: T 98.1, HR 73, /77 RR 17 SpO2 97%  Labs: WBC 12.13 D-dimer 449, Trops 88-->93 BUN 32 Cr 1.90  CT head: No acute intracranial abnormality or significant adverse change since 10/03/2024  EKG: NSR and ST elevation in V2 unchanged from prior EKGs  Interventions: none        Allergies    citrus (Urticaria)  Bactrim (Rash)    Intolerances      REVIEW OF SYSTEMS:    CONSTITUTIONAL: No fever, weight loss, or fatigue  EYES: No eye pain, visual disturbances, or discharge  ENMT:  No difficulty hearing, tinnitus, vertigo; No sinus or throat pain  NECK: No pain or stiffness  BREASTS: No pain, masses, or nipple discharge  RESPIRATORY: No cough, wheezing, chills or hemoptysis; No shortness of breath  CARDIOVASCULAR: No chest pain, palpitations, dizziness, or leg swelling  GASTROINTESTINAL: No abdominal or epigastric pain. No nausea, vomiting, or hematemesis; No diarrhea or constipation. No melena or hematochezia.  GENITOURINARY: No dysuria, frequency, hematuria, or incontinence  NEUROLOGICAL: No headaches, loss of strength, numbness, or tremors  SKIN: No itching, burning, rashes, or lesions   LYMPH NODES: No enlarged glands  ENDOCRINE: No heat or cold intolerance; No hair loss  MUSCULOSKELETAL: No joint pain or swelling; No muscle, back, or extremity pain  PSYCHIATRIC: No depression, anxiety, mood swings, or difficulty sleeping  HEME/LYMPH: No easy bruising, or bleeding gums  ALLERY AND IMMUNOLOGIC: No hives or eczema      MEDICATIONS  (STANDING):  atorvastatin 40 milliGRAM(s) Oral at bedtime  cefTRIAXone   IVPB 2000 milliGRAM(s) IV Intermittent every 24 hours  dextrose 5%. 1000 milliLiter(s) (50 mL/Hr) IV Continuous <Continuous>  dextrose 5%. 1000 milliLiter(s) (100 mL/Hr) IV Continuous <Continuous>  dextrose 50% Injectable 25 Gram(s) IV Push once  dextrose 50% Injectable 12.5 Gram(s) IV Push once  dextrose 50% Injectable 25 Gram(s) IV Push once  glucagon  Injectable 1 milliGRAM(s) IntraMuscular once  heparin   Injectable 5000 Unit(s) SubCutaneous every 8 hours  insulin glargine Injectable (LANTUS) 23 Unit(s) SubCutaneous at bedtime  insulin lispro (ADMELOG) corrective regimen sliding scale   SubCutaneous Before meals and at bedtime  insulin lispro Injectable (ADMELOG) 14 Unit(s) SubCutaneous three times a day before meals    MEDICATIONS  (PRN):  dextrose Oral Gel 15 Gram(s) Oral once PRN Blood Glucose LESS THAN 70 milliGRAM(s)/deciliter    Vital Signs Last 24 Hrs  T(C): 36.6 (11-12-24 @ 11:48), Max: 36.7 (11-12-24 @ 08:45)  T(F): 97.8 (11-12-24 @ 11:48), Max: 98 (11-12-24 @ 08:45)  HR: 96 (11-12-24 @ 17:40) (71 - 101)  BP: 120/78 (11-12-24 @ 17:40) (114/85 - 178/93)  BP(mean): 92 (11-12-24 @ 17:40) (92 - 110)  RR: 16 (11-12-24 @ 17:40) (16 - 20)  SpO2: 96% (11-12-24 @ 17:40) (95% - 99%)    PHYSICAL EXAM:  Constitutional: NAD, well-groomed, well-developed  HEENT: PERRLA, EOMI, Normal Hearing, MMM  Neck: No LAD, No JVD  Back: Normal spine flexure, No CVA tenderness  Respiratory: CTAB  Cardiovascular: S1 and S2, RRR, no M/G/R  Gastrointestinal: BS+, soft, NT/ND  Extremities: No peripheral edema  Vascular: 2+ peripheral pulses  Neurological: A/O x 3, no focal deficits  Psychiatric: Normal mood, normal affect  Musculoskeletal: 5/5 strength b/l upper and lower extremities  Skin: No rashes        CBC Full  -  ( 12 Nov 2024 12:06 )  WBC Count : 8.57 K/uL  RBC Count : 3.75 M/uL  Hemoglobin : 11.0 g/dL  Hematocrit : 34.5 %  Platelet Count - Automated : 177 K/uL  Mean Cell Volume : 92.0 fl  Mean Cell Hemoglobin : 29.3 pg  Mean Cell Hemoglobin Concentration : 31.9 g/dL  Auto Neutrophil # : 6.17 K/uL  Auto Lymphocyte # : 1.53 K/uL  Auto Monocyte # : 0.68 K/uL  Auto Eosinophil # : 0.10 K/uL  Auto Basophil # : 0.05 K/uL  Auto Neutrophil % : 71.9 %  Auto Lymphocyte % : 17.9 %  Auto Monocyte % : 7.9 %  Auto Eosinophil % : 1.2 %  Auto Basophil % : 0.6 %      D-Dimer Assay, Quantitative: 449 ng/mL DDU (11-11-24 @ 22:14)        11-12    138  |  101  |  34[H]  ----------------------------<  174[H]  3.8   |  29  |  1.94[H]    Ca    9.0      12 Nov 2024 12:06  Phos  2.8     11-12  Mg     1.5     11-12      Pathology:

## 2024-11-12 NOTE — DIETITIAN INITIAL EVALUATION ADULT - PROBLEM SELECTOR PLAN 6
Follows Dr. Delaney at [facility]. receiving chemo and radiation therapy.  - f/u heme onc recs

## 2024-11-12 NOTE — DISCHARGE NOTE PROVIDER - HOSPITAL COURSE
#Discharge: do not delete    73 y/o F pmhx lung cancer mets to miguel, CKD, HLD, DM2, RA BIBEMS for AMS iso hypotension.    Hospital course (by problem):   #AMS 2/2 hypotension  Patient was unable to say the name of her medication but upon med rec, she takes nifedipine 60mg XR. She has had history of poor adherence to medications and inability to endorse how she is taking them. No clear reason why patient was hypotensive, and quickly returned to baseline and became oriented with holding her nifedipine. Nifedipine 30mg was started iso hypotension. Should follow up outpatient with PCP to decide on reinitiating the 60mg if warranted.    #demand ischemia 2/2 hypotension  Troponin T 88-->93. likely demand ischemia iso hypotension. Denies any CP, chest discomfort, abdominal pain. EKG with NSR and ST elevation in V2 unchanged from prior EKGs.      #T2DM  Patient is on insulin glargine 23 U at night and 14 U lispro pre meal. She has had previous admissions of hyperglycemia and hypoglycemia. She has endorsed she sometimes forgets her night time dose and is often unsure if she is taking her insulin correctly. She is enrolled in health homes and has a home health aid. She could benefit from increase in home health aid hours to help her with her ADLs.    Patient was discharged to: home    New medications: nifedipine 30mg daily  Medications that were stopped: nifedipine 60mg     Items to follow up as outpatient:  -f/u PCP    Physical exam at the time of discharge:  GENERAL: NAD, lying in bed comfortably  HEAD:  Atraumatic, normocephalic  EYES: EOMI, PERRLA, conjunctiva and sclera clear  NECK: Supple, trachea midline, no JVD  HEART: Regular rate and rhythm, no murmurs, rubs, or gallops  LUNGS: Unlabored respirations.  Clear to auscultation bilaterally, no crackles, wheezing, or rhonchi  ABDOMEN: Soft, nontender, nondistended, +BS  EXTREMITIES: 2+ peripheral pulses bilaterally. No clubbing, cyanosis, or edema  NERVOUS SYSTEM:  A&Ox4, moving all extremities, no focal deficits   SKIN: No rashes or lesions           #Discharge: do not delete    73 y/o F pmhx lung cancer mets to miguel, CKD, HLD, DM2, RA BIBEMS for AMS. CTH showed no acute intracranial abnormality of significant changes based on previous imaging. Pt's AMS likely iso hypotension from poor po intake, as pt found to be orthostatics positive. She was given fluids. Hypotension also could be 2/2 BP meds. Patient was unable to say the name of her medication but upon med rec, she takes nifedipine 60mg XR. She has had history of poor adherence to medications and inability to endorse how she is taking them. No clear reason why patient was hypotensive, and quickly returned to baseline and became oriented with holding her nifedipine. Pt's BS have also been uncontrolled, A1c 10.5%, likely due to med noncompliance iso cognitive problems vs. metastatic ca vs. hypovolemia. Pt was also found to have  UTI, s/p CTX 2g x 3d. Given waxing/waning mental status and decline in functioning, pt would benefit from increase in HHA hours to help with her ADLs. She should follow up outpatient with PCP.    Hospital course (by problem):   #AMS 2/2 hypotension   ossibly iso hypotension due to taking BP meds vs. orthostatic hypotension vs. UTI  CTH with no acute intracranial abnormality of significant changes since 10/03/2024. Noted to have similiar AMS on prior admissions.  UA grossly positive with bacteria, leukocyte esterase, WBCs  Currently AOx3, pt more alert today   Orthostatics positive 11/12, s/p 1L NS and additional 500cc NS  - S/p CTX 2g q24hr for UTI (day 3/3)  - Holding nifedipine, pt should f/u with her PCP regarding reinstating    #Acute UTI  UA grossly positive with bacteria, leukocyte esterase, WBCs. Pt without leukocytosis or fever, denies any urinary sxs, but is a poor historian.  - S/p CTX 2g q24hr (day 3/3)    #demand ischemia 2/2 hypotension  Troponin T 88-->93. likely demand ischemia iso hypotension. Denies any CP, chest discomfort, abdominal pain. EKG with NSR and ST elevation in V2 unchanged from prior EKGs. Downtrending.    #T2DM  #Hyperglycemia  Patient is on insulin glargine 23 U at night and 14 U lispro pre meal. She has had previous admissions of hyperglycemia and hypoglycemia. She has endorsed she sometimes forgets her night time dose and is often unsure if she is taking her insulin correctly. She is enrolled in health homes and has a home health aid. She could benefit from increase in home health aid hours to help her with her ADLs.  A1c 10.5%  - Pt should c/w lantus 28u, lispro 22u premeal    Patient was discharged to: home    New medications:   Medications that were stopped: nifedipine 60mg     Items to follow up as outpatient:  -f/u PCP    Physical exam at the time of discharge:  GENERAL: NAD, lying in bed comfortably  HEAD:  Atraumatic, normocephalic  EYES: EOMI, PERRLA, conjunctiva and sclera clear  NECK: Supple, trachea midline, no JVD  HEART: Regular rate and rhythm, no murmurs, rubs, or gallops  LUNGS: Unlabored respirations.  Clear to auscultation bilaterally, no crackles, wheezing, or rhonchi  ABDOMEN: Soft, nontender, nondistended, +BS  EXTREMITIES: 2+ peripheral pulses bilaterally. No clubbing, cyanosis, or edema  NERVOUS SYSTEM:  A&Ox4, moving all extremities, no focal deficits   SKIN: No rashes or lesions           75 y/o F pmhx lung cancer mets to miguel, CKD, HLD, DM2, RA BIBEMS for AMS. CTH showed no acute intracranial abnormality of significant changes based on previous imaging. Pt's AMS likely iso hypotension from poor po intake, as pt found to be orthostatics positive. She was given fluids. Hypotension also could be 2/2 BP meds. Patient was unable to say the name of her medication but upon med rec, she takes nifedipine 60mg XR. She has had history of poor adherence to medications and inability to endorse how she is taking them. No clear reason why patient was hypotensive, and quickly returned to baseline and became oriented with holding her nifedipine. Pt's BS have also been uncontrolled, A1c 10.5%, likely due to med noncompliance iso cognitive problems vs. metastatic ca vs. hypovolemia. Pt was also found to have  UTI, s/p CTX 2g x 3d. Given waxing/waning mental status and decline in functioning, pt would benefit from increase in HHA hours to help with her ADLs. She should follow up outpatient with PCP.    Hospital course (by problem):   #AMS 2/2 hypotension   ossibly iso hypotension due to taking BP meds vs. orthostatic hypotension vs. UTI  CTH with no acute intracranial abnormality of significant changes since 10/03/2024. Noted to have similar AMS on prior admissions.  UA grossly positive with bacteria, leukocyte esterase, WBCs  Currently AOx3, pt more alert today   Orthostatics positive 11/12, s/p 1L NS and additional 500cc NS  - S/p CTX 2g q24hr for UTI (day 3/3)  - Holding nifedipine, started amlodipine 5mg q24hr  - pt should f/u with her PCP to discuss blood pressure control    #Acute UTI  UA grossly positive with bacteria, leukocyte esterase, WBCs. Pt without leukocytosis or fever, denies any urinary sxs, but is a poor historian.  - S/p CTX 2g q24hr (day 3/3)    #demand ischemia 2/2 hypotension  Troponin T 88-->93. likely demand ischemia iso hypotension. Denies any CP, chest discomfort, abdominal pain. EKG with NSR and ST elevation in V2 unchanged from prior EKGs. Downtrending.    #T2DM  #Hyperglycemia  Patient is on insulin glargine 23 U at night and 14 U lispro pre meal. She has had previous admissions of hyperglycemia and hypoglycemia. She has endorsed she sometimes forgets her night time dose and is often unsure if she is taking her insulin correctly. She is enrolled in health homes and has a home health aid. She could benefit from increase in home health aid hours to help her with her ADLs.  A1c 10.5%  - Pt should c/w lantus 28u, lispro 22u premeal    Patient was discharged to: home    New medications: amlodipine 5mg  Medications that were stopped: nifedipine 60mg     Items to follow up as outpatient:  -f/u PCP    Physical exam at the time of discharge:  GENERAL: NAD, lying in bed comfortably  HEAD:  Atraumatic, normocephalic  EYES: EOMI, PERRLA, conjunctiva and sclera clear  NECK: Supple, trachea midline, no JVD  HEART: Regular rate and rhythm, no murmurs, rubs, or gallops  LUNGS: Unlabored respirations.  Clear to auscultation bilaterally, no crackles, wheezing, or rhonchi  ABDOMEN: Soft, nontender, nondistended, +BS  EXTREMITIES: 2+ peripheral pulses bilaterally. No clubbing, cyanosis, or edema  NERVOUS SYSTEM:  A&Ox4, moving all extremities, no focal deficits   SKIN: No rashes or lesions           73 y/o F pmhx lung cancer mets to miguel, CKD, HLD, DM2, RA BIBEMS for AMS. CTH showed no acute intracranial abnormality of significant changes based on previous imaging. Pt's AMS likely iso hypotension from poor po intake, as pt found to be orthostatics positive. She was given fluids. Hypotension also could be 2/2 BP meds. Patient was unable to say the name of her medication but upon med rec, she takes nifedipine 60mg XR. She has had history of poor adherence to medications and inability to endorse how she is taking them. No clear reason why patient was hypotensive, and quickly returned to baseline and became oriented with holding her nifedipine. Pt was restarted on amlodipine 10mg qd with good BP control. Pt's BS have also been uncontrolled, A1c 10.5%, likely due to med noncompliance iso cognitive problems vs. metastatic ca vs. hypovolemia. Pt was started on lantus 26u, lispro 14u. Pt was also found to have UTI, s/p CTX 2g x 3d. Given waxing/waning mental status and decline in functioning, pt would benefit from increase in HHA hours to help with her ADLs. Pt rec for SULMA. She should follow up outpatient with PCP.    Hospital course (by problem):   #AMS 2/2 hypotension  Possibly iso hypotension due to taking BP meds vs. orthostatic hypotension vs. UTI  CTH with no acute intracranial abnormality of significant changes since 10/03/2024. Noted to have similar AMS on prior admissions.  UA grossly positive with bacteria, leukocyte esterase, WBCs  Currently AOx3, pt more alert today   Orthostatics positive 11/12, s/p 1L NS and additional 500cc NS  - S/p CTX 2g q24hr for UTI (day 3/3)  - Holding nifedipine, started amlodipine 10mg q24hr  - pt should f/u with her PCP to discuss blood pressure control    #Acute UTI  UA grossly positive with bacteria, leukocyte esterase, WBCs. Pt without leukocytosis or fever, denies any urinary sxs, but is a poor historian.  - S/p CTX 2g q24hr x 3d    #demand ischemia 2/2 hypotension  Troponin T 88-->93. likely demand ischemia iso hypotension. Denies any CP, chest discomfort, abdominal pain. EKG with NSR and ST elevation in V2 unchanged from prior EKGs. Downtrending.    #T2DM  #Hyperglycemia  Patient is on insulin glargine 23 U at night and 14 U lispro pre meal. She has had previous admissions of hyperglycemia and hypoglycemia. She has endorsed she sometimes forgets her night time dose and is often unsure if she is taking her insulin correctly. She is enrolled in health homes and has a home health aid. She could benefit from increase in home health aid hours to help her with her ADLs.  A1c 10.5%  - Pt should c/w lantus 26u, lispro 14u premeal    Patient was discharged to: [facility]    New medications: amlodipine 10mg  Medications that were stopped: nifedipine 60mg     Items to follow up as outpatient:  -f/u PCP    Physical exam at the time of discharge:  GENERAL: NAD, lying in bed comfortably  HEAD:  Atraumatic, normocephalic  EYES: EOMI, PERRLA, conjunctiva and sclera clear  NECK: Supple, trachea midline, no JVD  HEART: Regular rate and rhythm, no murmurs, rubs, or gallops  LUNGS: Unlabored respirations.  Clear to auscultation bilaterally, no crackles, wheezing, or rhonchi  ABDOMEN: Soft, nontender, nondistended, +BS  EXTREMITIES: 2+ peripheral pulses bilaterally. No clubbing, cyanosis, or edema  NERVOUS SYSTEM:  A&Ox4, moving all extremities, no focal deficits   SKIN: No rashes or lesions

## 2024-11-12 NOTE — DISCHARGE NOTE PROVIDER - NSDCFUSCHEDAPPT_GEN_ALL_CORE_FT
Sedrick Delaney  [facility] PreAdmits  Scheduled Appointment: 11/14/2024    [facility]  [address]  [address]  Scheduled Appointment: 11/14/2024    [facility]  [address]  [address]  Scheduled Appointment: 11/21/2024     [facility]  [address]  [address]  Scheduled Appointment: 11/21/2024     Sedrick Delaney  [facility] PreAdmits  Scheduled Appointment: 11/21/2024    [facility]  [address]  [address]  Scheduled Appointment: 11/21/2024    America Esparza  [facility]  [address]  Scheduled Appointment: 02/14/2025

## 2024-11-12 NOTE — H&P ADULT - PROBLEM SELECTOR PLAN 1
Possibly iso hypotension due to taking BP meds. Pt does not recall the   CTH with no acute intracranial abnormality of significant changes since 10/03/2024. Noted to have similiar AMS on prior admissions. RESOLVED, currently AO4  - f/u orthostatics  - PT/OT consult

## 2024-11-12 NOTE — DIETITIAN INITIAL EVALUATION ADULT - PERTINENT MEDS FT
MEDICATIONS  (STANDING):  atorvastatin 40 milliGRAM(s) Oral at bedtime  dextrose 5%. 1000 milliLiter(s) (100 mL/Hr) IV Continuous <Continuous>  dextrose 5%. 1000 milliLiter(s) (50 mL/Hr) IV Continuous <Continuous>  dextrose 50% Injectable 25 Gram(s) IV Push once  dextrose 50% Injectable 12.5 Gram(s) IV Push once  dextrose 50% Injectable 25 Gram(s) IV Push once  glucagon  Injectable 1 milliGRAM(s) IntraMuscular once  heparin   Injectable 5000 Unit(s) SubCutaneous every 8 hours  insulin glargine Injectable (LANTUS) 23 Unit(s) SubCutaneous at bedtime  insulin lispro (ADMELOG) corrective regimen sliding scale   SubCutaneous Before meals and at bedtime  insulin lispro Injectable (ADMELOG) 14 Unit(s) SubCutaneous three times a day before meals  insulin NPH human recombinant 10 Unit(s) SubCutaneous once    MEDICATIONS  (PRN):  dextrose Oral Gel 15 Gram(s) Oral once PRN Blood Glucose LESS THAN 70 milliGRAM(s)/deciliter

## 2024-11-12 NOTE — DIETITIAN INITIAL EVALUATION ADULT - OTHER CALCULATIONS
Estimated needs based on IBW as dosing wt 140lb/63.4kg >120% IBW (122%). Needs adjusted for age, BMI, renal with DM, and clinical status.

## 2024-11-12 NOTE — OCCUPATIONAL THERAPY INITIAL EVALUATION ADULT - ADDITIONAL COMMENTS
Pt is a poor historian 2/2 impaired cognition and decreased command following, minimally verbal throughout session. Per chart, pt was able to perform all mobility/ADLs independently prior to admission. Pt lives alone in apartment with elevator access. Pt uses rollator for ambulation, and has shower chair with grab bars, as well as a commode. Pt has HHA 7 hours x 3days to assist with IADLs. Pt is R hand dominant.

## 2024-11-12 NOTE — DIETITIAN INITIAL EVALUATION ADULT - OTHER INFO
74F with PMH of lung cancer with mets to brain, CKD, HLD, and DM2 who presented with AMS in setting of hypotension, admitted for management.     Pt seen on 7WO for assessment. Labs and medication orders reviewed. No updated labs 11/12, POC blood glucose (11/11-11/12) [phone]. Ordered for 23U lantus, 14U premeal admelog, 10U NPH. On Consistent Carbohydrate diet. Pt sitting up in bed having breakfast on visit this AM. RD observed pt complete >90% breakfast tray of eggs, wyatt, and fruit independently without overt signs of difficulty. Pt reports good appetite and intake. Reports no difficulty chewing/swallowing regular textures. States UBW ~144lb; wt history per prior admission RD note (8/16) 144lb; current admission wt 140lb/63.4kg and RD obtained bed scale wt 142lb/64.3kg indicate relative wt stability x3 months. Nutrition-focused physical examination insignificant for overt signs of wasting. Pt denies nausea/vomiting/diarrhea/constipation, reports last BM yesterday. Confirms citrus allergy - documented in chart No Mandaen/ethnic/cultural food preferences noted. No pressure injuries or edema documented, Fabricio score 17. See nutrition recommendations. RD to remain available.

## 2024-11-12 NOTE — H&P ADULT - PROBLEM SELECTOR PLAN 2
Troponin T 88-->93. likely demand ischemia iso hypotension. Denies any CP, chest discomfort, abdominal pain.  - trend troponin in the AM to peak Troponin T 88-->93. likely demand ischemia iso hypotension. Denies any CP, chest discomfort, abdominal pain. EKG with NSR and ST elevation in V2 unchanged from prior EKGs  - trend troponin in the AM to peak

## 2024-11-12 NOTE — H&P ADULT - NSHPLABSRESULTS_GEN_ALL_CORE
LABS:                         12.4   12.13 )-----------( 214      ( 11 Nov 2024 18:27 )             38.7     11-11    135  |  96  |  32[H]  ----------------------------<  345[H]  4.3   |  27  |  1.90[H]    Ca    9.2      11 Nov 2024 18:27        Urinalysis Basic - ( 11 Nov 2024 18:27 )    Color: x / Appearance: x / SG: x / pH: x  Gluc: 345 mg/dL / Ketone: x  / Bili: x / Urobili: x   Blood: x / Protein: x / Nitrite: x   Leuk Esterase: x / RBC: x / WBC x   Sq Epi: x / Non Sq Epi: x / Bacteria: x                RADIOLOGY, EKG & ADDITIONAL TESTS: Reviewed.

## 2024-11-12 NOTE — H&P ADULT - PROBLEM SELECTOR PLAN 5
Baseline Cr 1.56. On admission 1.90  - avoid nephrotoxic agent outpatient  - f/u urine studies  - f/u urine lytes

## 2024-11-13 DIAGNOSIS — N39.0 URINARY TRACT INFECTION, SITE NOT SPECIFIED: ICD-10-CM

## 2024-11-13 LAB
A1C WITH ESTIMATED AVERAGE GLUCOSE RESULT: 10.5 % — HIGH (ref 4–5.6)
ALBUMIN SERPL ELPH-MCNC: 3.3 G/DL — SIGNIFICANT CHANGE UP (ref 3.3–5)
ALP SERPL-CCNC: SIGNIFICANT CHANGE UP (ref 40–120)
ALT FLD-CCNC: SIGNIFICANT CHANGE UP (ref 10–45)
ANION GAP SERPL CALC-SCNC: 13 MMOL/L — SIGNIFICANT CHANGE UP (ref 5–17)
AST SERPL-CCNC: SIGNIFICANT CHANGE UP (ref 10–40)
BASOPHILS # BLD AUTO: 0.05 K/UL — SIGNIFICANT CHANGE UP (ref 0–0.2)
BASOPHILS NFR BLD AUTO: 0.7 % — SIGNIFICANT CHANGE UP (ref 0–2)
BILIRUB SERPL-MCNC: 0.2 MG/DL — SIGNIFICANT CHANGE UP (ref 0.2–1.2)
BLD GP AB SCN SERPL QL: NEGATIVE — SIGNIFICANT CHANGE UP
BUN SERPL-MCNC: 30 MG/DL — HIGH (ref 7–23)
CALCIUM SERPL-MCNC: 8.6 MG/DL — SIGNIFICANT CHANGE UP (ref 8.4–10.5)
CHLORIDE SERPL-SCNC: 101 MMOL/L — SIGNIFICANT CHANGE UP (ref 96–108)
CO2 SERPL-SCNC: 19 MMOL/L — LOW (ref 22–31)
CREAT SERPL-MCNC: 1.68 MG/DL — HIGH (ref 0.5–1.3)
EGFR: 32 ML/MIN/1.73M2 — LOW
EOSINOPHIL # BLD AUTO: 0.14 K/UL — SIGNIFICANT CHANGE UP (ref 0–0.5)
EOSINOPHIL NFR BLD AUTO: 2.1 % — SIGNIFICANT CHANGE UP (ref 0–6)
ESTIMATED AVERAGE GLUCOSE: 255 MG/DL — HIGH (ref 68–114)
GLUCOSE SERPL-MCNC: 234 MG/DL — HIGH (ref 70–99)
HCT VFR BLD CALC: 36.9 % — SIGNIFICANT CHANGE UP (ref 34.5–45)
HGB BLD-MCNC: 11.7 G/DL — SIGNIFICANT CHANGE UP (ref 11.5–15.5)
IMM GRANULOCYTES NFR BLD AUTO: 0.4 % — SIGNIFICANT CHANGE UP (ref 0–0.9)
LYMPHOCYTES # BLD AUTO: 1.39 K/UL — SIGNIFICANT CHANGE UP (ref 1–3.3)
LYMPHOCYTES # BLD AUTO: 20.7 % — SIGNIFICANT CHANGE UP (ref 13–44)
MAGNESIUM SERPL-MCNC: 1.7 MG/DL — SIGNIFICANT CHANGE UP (ref 1.6–2.6)
MCHC RBC-ENTMCNC: 29.3 PG — SIGNIFICANT CHANGE UP (ref 27–34)
MCHC RBC-ENTMCNC: 31.7 G/DL — LOW (ref 32–36)
MCV RBC AUTO: 92.3 FL — SIGNIFICANT CHANGE UP (ref 80–100)
MONOCYTES # BLD AUTO: 0.57 K/UL — SIGNIFICANT CHANGE UP (ref 0–0.9)
MONOCYTES NFR BLD AUTO: 8.5 % — SIGNIFICANT CHANGE UP (ref 2–14)
NEUTROPHILS # BLD AUTO: 4.52 K/UL — SIGNIFICANT CHANGE UP (ref 1.8–7.4)
NEUTROPHILS NFR BLD AUTO: 67.6 % — SIGNIFICANT CHANGE UP (ref 43–77)
NRBC # BLD: 0 /100 WBCS — SIGNIFICANT CHANGE UP (ref 0–0)
PHOSPHATE SERPL-MCNC: 3.6 MG/DL — SIGNIFICANT CHANGE UP (ref 2.5–4.5)
PLATELET # BLD AUTO: 110 K/UL — LOW (ref 150–400)
POTASSIUM SERPL-MCNC: SIGNIFICANT CHANGE UP (ref 3.5–5.3)
POTASSIUM SERPL-SCNC: SIGNIFICANT CHANGE UP (ref 3.5–5.3)
PROT SERPL-MCNC: 7 G/DL — SIGNIFICANT CHANGE UP (ref 6–8.3)
RBC # BLD: 4 M/UL — SIGNIFICANT CHANGE UP (ref 3.8–5.2)
RBC # FLD: 15.3 % — HIGH (ref 10.3–14.5)
RH IG SCN BLD-IMP: POSITIVE — SIGNIFICANT CHANGE UP
SODIUM SERPL-SCNC: 133 MMOL/L — LOW (ref 135–145)
WBC # BLD: 6.7 K/UL — SIGNIFICANT CHANGE UP (ref 3.8–10.5)
WBC # FLD AUTO: 6.7 K/UL — SIGNIFICANT CHANGE UP (ref 3.8–10.5)

## 2024-11-13 PROCEDURE — 36000 PLACE NEEDLE IN VEIN: CPT

## 2024-11-13 PROCEDURE — 76937 US GUIDE VASCULAR ACCESS: CPT | Mod: 26,59

## 2024-11-13 PROCEDURE — 99232 SBSQ HOSP IP/OBS MODERATE 35: CPT | Mod: GC

## 2024-11-13 RX ORDER — POTASSIUM PHOSPHATE, MONOBASIC POTASSIUM PHOSPHATE, DIBASIC INJECTION, 236; 224 MG/ML; MG/ML
30 SOLUTION, CONCENTRATE INTRAVENOUS ONCE
Refills: 0 | Status: DISCONTINUED | OUTPATIENT
Start: 2024-11-13 | End: 2024-11-13

## 2024-11-13 RX ORDER — SODIUM CHLORIDE 9 MG/ML
500 INJECTION, SOLUTION INTRAMUSCULAR; INTRAVENOUS; SUBCUTANEOUS ONCE
Refills: 0 | Status: COMPLETED | OUTPATIENT
Start: 2024-11-13 | End: 2024-11-13

## 2024-11-13 RX ORDER — INSULIN GLARGINE 100 [IU]/ML
28 INJECTION, SOLUTION SUBCUTANEOUS AT BEDTIME
Refills: 0 | Status: DISCONTINUED | OUTPATIENT
Start: 2024-11-13 | End: 2024-11-14

## 2024-11-13 RX ORDER — ACETAMINOPHEN 500MG 500 MG/1
325 TABLET, COATED ORAL ONCE
Refills: 0 | Status: COMPLETED | OUTPATIENT
Start: 2024-11-13 | End: 2024-11-13

## 2024-11-13 RX ADMIN — Medication 5000 UNIT(S): at 23:15

## 2024-11-13 RX ADMIN — Medication 14 UNIT(S): at 13:08

## 2024-11-13 RX ADMIN — ACETAMINOPHEN 500MG 325 MILLIGRAM(S): 500 TABLET, COATED ORAL at 06:47

## 2024-11-13 RX ADMIN — Medication 5000 UNIT(S): at 06:46

## 2024-11-13 RX ADMIN — INSULIN GLARGINE 28 UNIT(S): 100 INJECTION, SOLUTION SUBCUTANEOUS at 22:43

## 2024-11-13 RX ADMIN — Medication 8: at 13:07

## 2024-11-13 RX ADMIN — Medication 25 GRAM(S): at 22:43

## 2024-11-13 RX ADMIN — Medication 100 MILLIGRAM(S): at 21:35

## 2024-11-13 RX ADMIN — Medication 14 UNIT(S): at 09:28

## 2024-11-13 RX ADMIN — Medication 5000 UNIT(S): at 16:17

## 2024-11-13 RX ADMIN — Medication 6: at 22:42

## 2024-11-13 RX ADMIN — Medication 40 MILLIGRAM(S): at 22:43

## 2024-11-13 RX ADMIN — SODIUM CHLORIDE 500 MILLILITER(S): 9 INJECTION, SOLUTION INTRAMUSCULAR; INTRAVENOUS; SUBCUTANEOUS at 21:35

## 2024-11-13 RX ADMIN — Medication 2: at 09:30

## 2024-11-13 NOTE — PROGRESS NOTE ADULT - PROBLEM SELECTOR PLAN 8
Home atorvastatin 40mg qD.  - c/w home meds Follows Dr. Delaney at [facility]. receiving chemo and radiation therapy.    - f/u heme onc recs Last admission, nifedipine ER 60mg. But claims that she takes a medication that starts with "X"    - ctm BPs  - hold BP meds iso hypotension on arrival, resume as tolerated

## 2024-11-13 NOTE — PROCEDURE NOTE - ADDITIONAL PROCEDURE DETAILS
Line placed under POCUS guidance, w/ sterile technique. Pt tolerated procedure well. Line had blood return and flushed easily. Flush was confirmed by ultrasound. IV bandaged, CDI.

## 2024-11-13 NOTE — PROGRESS NOTE ADULT - PROBLEM SELECTOR PLAN 1
Possibly iso hypotension due to taking BP meds. Pt does not recall the   CTH with no acute intracranial abnormality of significant changes since 10/03/2024. Noted to have similiar AMS on prior admissions. RESOLVED, currently AO4  - f/u orthostatics  - PT/OT consult Possibly iso hypotension due to taking BP meds vs. orthostatic hypotension vs. UTI  CTH with no acute intracranial abnormality of significant changes since 10/03/2024. Noted to have similiar AMS on prior admissions.  UA grossly positive with bacteria, leukocyte esterase, WBCs  Currently AOx2-3  Orthostatics positive 11/12, s/p 1L NS    - Orthostatics positive today, will give another  cc bolus  - C/w CTX 2g q24hr for UTI  - PT/OT rec SULMA  - Will discuss with pt's family her baseline mental status, as well as acquiring more support for her at home

## 2024-11-13 NOTE — PROGRESS NOTE ADULT - PROBLEM SELECTOR PLAN 3
Home regiment: lantus 23U when not eating, admelog 14U for regular meal.  - c/w home insulin regimen Troponin T 88-->93. likely demand ischemia iso hypotension. Denies any CP, chest discomfort, abdominal pain. EKG with NSR and ST elevation in V2 unchanged from prior EKGs.  Troponin T 71 this AM    - CTM

## 2024-11-13 NOTE — PROGRESS NOTE ADULT - PROBLEM SELECTOR PLAN 4
Poorly controlled T2DM, most recent A1C 7/31/24 12.4. likely d/t med noncompliance iso cognitive problems, metastatic lung cancer to brain  - diabetes management as above  - f/u A1c Home regiment: lantus 23U when not eating, admelog 14U for regular meal.    - Increase lantus 28u  - Increase lispro to 17u premeal  - miSS  - Monitor FS

## 2024-11-13 NOTE — PROGRESS NOTE ADULT - SUBJECTIVE AND OBJECTIVE BOX
INTERVAL HPI/OVERNIGHT EVENTS:  Awake and knew me today  Oncology follow up noted   Agree her home situation is not adequate  Will contact patients daughter   Agree with antibiotics ;  No orthostatic changes noted today       MEDICATIONS  (STANDING):  atorvastatin 40 milliGRAM(s) Oral at bedtime  cefTRIAXone   IVPB 2000 milliGRAM(s) IV Intermittent every 24 hours  dextrose 5%. 1000 milliLiter(s) (50 mL/Hr) IV Continuous <Continuous>  dextrose 5%. 1000 milliLiter(s) (100 mL/Hr) IV Continuous <Continuous>  dextrose 50% Injectable 25 Gram(s) IV Push once  dextrose 50% Injectable 12.5 Gram(s) IV Push once  dextrose 50% Injectable 25 Gram(s) IV Push once  glucagon  Injectable 1 milliGRAM(s) IntraMuscular once  heparin   Injectable 5000 Unit(s) SubCutaneous every 8 hours  insulin glargine Injectable (LANTUS) 23 Unit(s) SubCutaneous at bedtime  insulin lispro (ADMELOG) corrective regimen sliding scale   SubCutaneous Before meals and at bedtime  insulin lispro Injectable (ADMELOG) 14 Unit(s) SubCutaneous three times a day before meals    MEDICATIONS  (PRN):  dextrose Oral Gel 15 Gram(s) Oral once PRN Blood Glucose LESS THAN 70 milliGRAM(s)/deciliter      Allergies    citrus (Urticaria)  Bactrim (Rash)    Intolerances        Vital Signs Last 24 Hrs  T(C): 36.7 (13 Nov 2024 05:27), Max: 36.7 (12 Nov 2024 20:24)  T(F): 98 (13 Nov 2024 05:27), Max: 98 (12 Nov 2024 20:24)  HR: 83 (13 Nov 2024 05:27) (82 - 96)  BP: 158/77 (13 Nov 2024 05:27) (120/78 - 167/82)  BP(mean): 92 (12 Nov 2024 17:40) (92 - 110)  RR: 16 (13 Nov 2024 05:27) (16 - 18)  SpO2: 97% (13 Nov 2024 05:27) (96% - 97%)    Parameters below as of 13 Nov 2024 05:27  Patient On (Oxygen Delivery Method): room air              Constitutional:  Awake     Eyes: NEDRA    ENMT: Negative    Neck: Supple    Back:  no tenderness     Respiratory:  clear     Cardiovascular: S1 S2    Gastrointestinal:  soft     Genitourinary:    Extremities:  no edema     Vascular:    Neurological:    Skin:    Lymph Nodes:            11-12 @ 07:01  -  11-13 @ 07:00  --------------------------------------------------------  IN: 1860 mL / OUT: 0 mL / NET: 1860 mL      LABS:                        11.0   8.57  )-----------( 177      ( 12 Nov 2024 12:06 )             34.5     11-12    138  |  101  |  34[H]  ----------------------------<  174[H]  3.8   |  29  |  1.94[H]    Ca    9.0      12 Nov 2024 12:06  Phos  2.8     11-12  Mg     1.5     11-12        Urinalysis Basic - ( 12 Nov 2024 12:06 )    Color: x / Appearance: x / SG: x / pH: x  Gluc: 174 mg/dL / Ketone: x  / Bili: x / Urobili: x   Blood: x / Protein: x / Nitrite: x   Leuk Esterase: x / RBC: x / WBC x   Sq Epi: x / Non Sq Epi: x / Bacteria: x        RADIOLOGY & ADDITIONAL TESTS:

## 2024-11-13 NOTE — PROGRESS NOTE ADULT - PROBLEM SELECTOR PLAN 7
Last admission, nifedipine ER 60mg. But claims that she takes a medication that starts with "X"  - ctm BPs  - hold BP meds iso hypotension on arrival Baseline Cr 1.56. On admission 1.90. 1.94 today.    - avoid nephrotoxic agent outpatient  - f/u urine studies  - f/u urine lytes Follows Dr. Delaney at [facility]. receiving chemo and radiation therapy.    - per heme/onc, pt has not had chemotherapy for several weeks due to intermittent forgetfulness  --> pt should restart her treatment, but at this time placement or at least placement home with proper services takes precedence  - f/u heme/onc recs

## 2024-11-13 NOTE — PROGRESS NOTE ADULT - PROBLEM SELECTOR PLAN 6
Follows Dr. Delaney at [facility]. receiving chemo and radiation therapy.  - f/u heme onc recs Baseline Cr 1.56. On admission 1.90,   FeNa 0.4%, prerenal    - Continue to trend Cr  - Avoid nephrotoxic agents  - Adjust medication dosages for level of renal function

## 2024-11-13 NOTE — PROGRESS NOTE ADULT - TIME BILLING
To continue present regimen   Will contact patients daughter   Will also discuss with other caregivers  Continue antibiotics

## 2024-11-13 NOTE — PROGRESS NOTE ADULT - PROBLEM SELECTOR PLAN 9
Hx of chronic RA, doesn't take home meds for it  - f/u outpatient PCP Last admission, nifedipine ER 60mg. But claims that she takes a medication that starts with "X"    - ctm BPs  - hold BP meds iso hypotension on arrival Home atorvastatin 40mg qD.    - c/w home meds

## 2024-11-13 NOTE — PROGRESS NOTE ADULT - SUBJECTIVE AND OBJECTIVE BOX
OVERNIGHT EVENTS:    SUBJECTIVE / INTERVAL HPI: Patient seen and examined at bedside.       PHYSICAL EXAM:    General: NAD  HEENT: NC/AT; PERRL, anicteric sclera; MMM  Neck: supple  Cardiovascular: +S1/S2, RRR  Respiratory: CTA B/L; no W/R/R  Gastrointestinal: soft, NT/ND; +BSx4  Extremities: WWP; no edema, clubbing or cyanosis  Vascular: 2+ radial, DP/PT pulses B/L  Neurological: AAOx3; no focal deficits  Psychiatric: pleasant mood and affect  Dermatologic: no appreciable wounds or damage to the skin    VITAL SIGNS:  Vital Signs Last 24 Hrs  T(C): 36.7 (13 Nov 2024 05:27), Max: 36.7 (12 Nov 2024 08:45)  T(F): 98 (13 Nov 2024 05:27), Max: 98 (12 Nov 2024 08:45)  HR: 83 (13 Nov 2024 05:27) (71 - 96)  BP: 158/77 (13 Nov 2024 05:27) (120/78 - 170/92)  BP(mean): 92 (12 Nov 2024 17:40) (92 - 110)  RR: 16 (13 Nov 2024 05:27) (16 - 18)  SpO2: 97% (13 Nov 2024 05:27) (96% - 98%)    Parameters below as of 13 Nov 2024 05:27  Patient On (Oxygen Delivery Method): room air          MEDICATIONS:  MEDICATIONS  (STANDING):  acetaminophen     Tablet .. 325 milliGRAM(s) Oral once  atorvastatin 40 milliGRAM(s) Oral at bedtime  cefTRIAXone   IVPB 2000 milliGRAM(s) IV Intermittent every 24 hours  dextrose 5%. 1000 milliLiter(s) (50 mL/Hr) IV Continuous <Continuous>  dextrose 5%. 1000 milliLiter(s) (100 mL/Hr) IV Continuous <Continuous>  dextrose 50% Injectable 25 Gram(s) IV Push once  dextrose 50% Injectable 12.5 Gram(s) IV Push once  dextrose 50% Injectable 25 Gram(s) IV Push once  glucagon  Injectable 1 milliGRAM(s) IntraMuscular once  heparin   Injectable 5000 Unit(s) SubCutaneous every 8 hours  insulin glargine Injectable (LANTUS) 23 Unit(s) SubCutaneous at bedtime  insulin lispro (ADMELOG) corrective regimen sliding scale   SubCutaneous Before meals and at bedtime  insulin lispro Injectable (ADMELOG) 14 Unit(s) SubCutaneous three times a day before meals    MEDICATIONS  (PRN):  dextrose Oral Gel 15 Gram(s) Oral once PRN Blood Glucose LESS THAN 70 milliGRAM(s)/deciliter      ALLERGIES:  Allergies    citrus (Urticaria)  Bactrim (Rash)    Intolerances        LABS:                        11.0   8.57  )-----------( 177      ( 12 Nov 2024 12:06 )             34.5     11-12    138  |  101  |  34[H]  ----------------------------<  174[H]  3.8   |  29  |  1.94[H]    Ca    9.0      12 Nov 2024 12:06  Phos  2.8     11-12  Mg     1.5     11-12        Urinalysis Basic - ( 12 Nov 2024 12:06 )    Color: x / Appearance: x / SG: x / pH: x  Gluc: 174 mg/dL / Ketone: x  / Bili: x / Urobili: x   Blood: x / Protein: x / Nitrite: x   Leuk Esterase: x / RBC: x / WBC x   Sq Epi: x / Non Sq Epi: x / Bacteria: x      CAPILLARY BLOOD GLUCOSE      POCT Blood Glucose.: 255 mg/dL (12 Nov 2024 22:14)      RADIOLOGY & ADDITIONAL TESTS: Reviewed. OVERNIGHT EVENTS: Pt c/o leg pain, gave tylenol.    SUBJECTIVE / INTERVAL HPI: Patient seen and examined at bedside. Pt resting comfortably. Pt has no complaints at this time. Denies fevers, chills, HA, chest pain, SOB, n/v/d, abd pain. ROS otherwise negative.      PHYSICAL EXAM:    General: NAD  HEENT: NC/AT; PERRL, anicteric sclera; MMM  Neck: supple  Cardiovascular: +S1/S2, RRR  Respiratory: CTA B/L; no W/R/R  Gastrointestinal: soft, NT/ND; +BSx4  Extremities: WWP; no edema, clubbing or cyanosis  Vascular: 2+ radial, DP/PT pulses B/L  Neurological: AAOx2-3; no focal deficits  Psychiatric: pleasant mood and affect  Dermatologic: no appreciable wounds or damage to the skin    VITAL SIGNS:  Vital Signs Last 24 Hrs  T(C): 36.7 (13 Nov 2024 05:27), Max: 36.7 (12 Nov 2024 08:45)  T(F): 98 (13 Nov 2024 05:27), Max: 98 (12 Nov 2024 08:45)  HR: 83 (13 Nov 2024 05:27) (71 - 96)  BP: 158/77 (13 Nov 2024 05:27) (120/78 - 170/92)  BP(mean): 92 (12 Nov 2024 17:40) (92 - 110)  RR: 16 (13 Nov 2024 05:27) (16 - 18)  SpO2: 97% (13 Nov 2024 05:27) (96% - 98%)    Parameters below as of 13 Nov 2024 05:27  Patient On (Oxygen Delivery Method): room air          MEDICATIONS:  MEDICATIONS  (STANDING):  acetaminophen     Tablet .. 325 milliGRAM(s) Oral once  atorvastatin 40 milliGRAM(s) Oral at bedtime  cefTRIAXone   IVPB 2000 milliGRAM(s) IV Intermittent every 24 hours  dextrose 5%. 1000 milliLiter(s) (50 mL/Hr) IV Continuous <Continuous>  dextrose 5%. 1000 milliLiter(s) (100 mL/Hr) IV Continuous <Continuous>  dextrose 50% Injectable 25 Gram(s) IV Push once  dextrose 50% Injectable 12.5 Gram(s) IV Push once  dextrose 50% Injectable 25 Gram(s) IV Push once  glucagon  Injectable 1 milliGRAM(s) IntraMuscular once  heparin   Injectable 5000 Unit(s) SubCutaneous every 8 hours  insulin glargine Injectable (LANTUS) 23 Unit(s) SubCutaneous at bedtime  insulin lispro (ADMELOG) corrective regimen sliding scale   SubCutaneous Before meals and at bedtime  insulin lispro Injectable (ADMELOG) 14 Unit(s) SubCutaneous three times a day before meals    MEDICATIONS  (PRN):  dextrose Oral Gel 15 Gram(s) Oral once PRN Blood Glucose LESS THAN 70 milliGRAM(s)/deciliter      ALLERGIES:  Allergies    citrus (Urticaria)  Bactrim (Rash)    Intolerances        LABS:                        11.0   8.57  )-----------( 177      ( 12 Nov 2024 12:06 )             34.5     11-12    138  |  101  |  34[H]  ----------------------------<  174[H]  3.8   |  29  |  1.94[H]    Ca    9.0      12 Nov 2024 12:06  Phos  2.8     11-12  Mg     1.5     11-12        Urinalysis Basic - ( 12 Nov 2024 12:06 )    Color: x / Appearance: x / SG: x / pH: x  Gluc: 174 mg/dL / Ketone: x  / Bili: x / Urobili: x   Blood: x / Protein: x / Nitrite: x   Leuk Esterase: x / RBC: x / WBC x   Sq Epi: x / Non Sq Epi: x / Bacteria: x      CAPILLARY BLOOD GLUCOSE      POCT Blood Glucose.: 255 mg/dL (12 Nov 2024 22:14)      RADIOLOGY & ADDITIONAL TESTS: Reviewed.

## 2024-11-13 NOTE — PROGRESS NOTE ADULT - PROBLEM SELECTOR PLAN 5
Baseline Cr 1.56. On admission 1.90  - avoid nephrotoxic agent outpatient  - f/u urine studies  - f/u urine lytes Poorly controlled T2DM, most recent A1C 7/31/24 12.4. 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 hypovolemia  Home regiment: lantus 23U when not eating, admelog 14U for regular meal.    - f/u A1c  - as above

## 2024-11-13 NOTE — PROGRESS NOTE ADULT - PROBLEM SELECTOR PLAN 10
w/ age corrected, wnl. Also possible elevated iso RA Home atorvastatin 40mg qD.    - c/w home meds Hx of chronic RA, doesn't take home meds for it    - f/u outpatient PCP

## 2024-11-13 NOTE — PROGRESS NOTE ADULT - PROBLEM SELECTOR PLAN 2
Troponin T 88-->93. likely demand ischemia iso hypotension. Denies any CP, chest discomfort, abdominal pain. EKG with NSR and ST elevation in V2 unchanged from prior EKGs  - trend troponin in the AM to peak UA grossly positive with bacteria, leukocyte esterase, WBCs. Pt without leukocytosis or fever, denies any urinary sxs, but is a poor historian.    - C/w CTX 2g q24hr

## 2024-11-13 NOTE — PROGRESS NOTE ADULT - ASSESSMENT
73 y/o F pmhx lung cancer mets to [facility], CKD, HLD, DM2, RA BIBEMS for AMS iso hypotension. 73 y/o F PMHX lung cancer mets to brain, CKD, HLD, DM2, RA BIBEMS for AMS iso hypotension.

## 2024-11-14 ENCOUNTER — APPOINTMENT (OUTPATIENT)
Dept: INFUSION THERAPY | Facility: CLINIC | Age: 74
End: 2024-11-14

## 2024-11-14 ENCOUNTER — TRANSCRIPTION ENCOUNTER (OUTPATIENT)
Age: 74
End: 2024-11-14

## 2024-11-14 PROCEDURE — 99232 SBSQ HOSP IP/OBS MODERATE 35: CPT | Mod: GC

## 2024-11-14 RX ORDER — INSULIN GLARGINE 100 [IU]/ML
28 INJECTION, SOLUTION SUBCUTANEOUS AT BEDTIME
Refills: 0 | Status: DISCONTINUED | OUTPATIENT
Start: 2024-11-14 | End: 2024-11-15

## 2024-11-14 RX ORDER — INSULIN GLARGINE 100 [IU]/ML
26 INJECTION, SOLUTION SUBCUTANEOUS
Qty: 0 | Refills: 0 | DISCHARGE
Start: 2024-11-14

## 2024-11-14 RX ORDER — LIDOCAINE 40 MG/G
1 CREAM TOPICAL EVERY 24 HOURS
Refills: 0 | Status: DISCONTINUED | OUTPATIENT
Start: 2024-11-14 | End: 2024-11-20

## 2024-11-14 RX ORDER — AMLODIPINE BESYLATE 10 MG/1
5 TABLET ORAL EVERY 24 HOURS
Refills: 0 | Status: DISCONTINUED | OUTPATIENT
Start: 2024-11-14 | End: 2024-11-19

## 2024-11-14 RX ORDER — AMLODIPINE BESYLATE 10 MG/1
1 TABLET ORAL
Qty: 30 | Refills: 0
Start: 2024-11-14 | End: 2024-12-13

## 2024-11-14 RX ADMIN — LIDOCAINE 1 PATCH: 40 CREAM TOPICAL at 13:27

## 2024-11-14 RX ADMIN — Medication 5000 UNIT(S): at 07:28

## 2024-11-14 RX ADMIN — LIDOCAINE 1 PATCH: 40 CREAM TOPICAL at 18:40

## 2024-11-14 RX ADMIN — Medication 6: at 13:21

## 2024-11-14 RX ADMIN — AMLODIPINE BESYLATE 5 MILLIGRAM(S): 10 TABLET ORAL at 17:25

## 2024-11-14 RX ADMIN — Medication 22 UNIT(S): at 13:24

## 2024-11-14 RX ADMIN — INSULIN GLARGINE 28 UNIT(S): 100 INJECTION, SOLUTION SUBCUTANEOUS at 22:48

## 2024-11-14 RX ADMIN — Medication 40 MILLIGRAM(S): at 22:48

## 2024-11-14 RX ADMIN — Medication 100 MILLIGRAM(S): at 13:38

## 2024-11-14 RX ADMIN — Medication 5000 UNIT(S): at 13:38

## 2024-11-14 NOTE — PROGRESS NOTE ADULT - PROBLEM SELECTOR PLAN 5
Poorly controlled T2DM, most recent A1C 7/31/24 12.4. 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 hypovolemia  Home regiment: lantus 23U when not eating, admelog 14U for regular meal.  A1c 10.5%    - as above

## 2024-11-14 NOTE — PROGRESS NOTE ADULT - PROBLEM SELECTOR PLAN 8
Last admission, nifedipine ER 60mg. But claims that she takes a medication that starts with "X"    - ctm BPs  - hold BP meds iso hypotension on arrival, resume as tolerated

## 2024-11-14 NOTE — PROGRESS NOTE ADULT - SUBJECTIVE AND OBJECTIVE BOX
OVERNIGHT EVENTS:    SUBJECTIVE / INTERVAL HPI: Patient seen and examined at bedside.       PHYSICAL EXAM:    General: NAD  HEENT: NC/AT; PERRL, anicteric sclera; MMM  Neck: supple  Cardiovascular: +S1/S2, RRR  Respiratory: CTA B/L; no W/R/R  Gastrointestinal: soft, NT/ND; +BSx4  Extremities: WWP; no edema, clubbing or cyanosis  Vascular: 2+ radial, DP/PT pulses B/L  Neurological: AAOx3; no focal deficits  Psychiatric: pleasant mood and affect  Dermatologic: no appreciable wounds or damage to the skin    VITAL SIGNS:  Vital Signs Last 24 Hrs  T(C): 36.2 (14 Nov 2024 06:30), Max: 36.6 (13 Nov 2024 20:35)  T(F): 97.2 (14 Nov 2024 06:30), Max: 97.9 (13 Nov 2024 20:35)  HR: 88 (14 Nov 2024 06:30) (66 - 88)  BP: 149/86 (14 Nov 2024 06:30) (149/86 - 166/82)  BP(mean): 110 (13 Nov 2024 20:35) (110 - 110)  RR: 18 (14 Nov 2024 06:30) (17 - 18)  SpO2: 98% (14 Nov 2024 06:30) (95% - 98%)    Parameters below as of 13 Nov 2024 20:35  Patient On (Oxygen Delivery Method): room air          MEDICATIONS:  MEDICATIONS  (STANDING):  atorvastatin 40 milliGRAM(s) Oral at bedtime  cefTRIAXone   IVPB 2000 milliGRAM(s) IV Intermittent every 24 hours  dextrose 5%. 1000 milliLiter(s) (100 mL/Hr) IV Continuous <Continuous>  dextrose 5%. 1000 milliLiter(s) (50 mL/Hr) IV Continuous <Continuous>  dextrose 50% Injectable 25 Gram(s) IV Push once  dextrose 50% Injectable 12.5 Gram(s) IV Push once  dextrose 50% Injectable 25 Gram(s) IV Push once  glucagon  Injectable 1 milliGRAM(s) IntraMuscular once  heparin   Injectable 5000 Unit(s) SubCutaneous every 8 hours  insulin glargine Injectable (LANTUS) 28 Unit(s) SubCutaneous at bedtime  insulin lispro (ADMELOG) corrective regimen sliding scale   SubCutaneous Before meals and at bedtime  insulin lispro Injectable (ADMELOG) 17 Unit(s) SubCutaneous three times a day before meals    MEDICATIONS  (PRN):  dextrose Oral Gel 15 Gram(s) Oral once PRN Blood Glucose LESS THAN 70 milliGRAM(s)/deciliter      ALLERGIES:  Allergies    citrus (Urticaria)  Bactrim (Rash)    Intolerances        LABS:                        11.7   6.70  )-----------( 110      ( 13 Nov 2024 17:20 )             36.9     11-13    133[L]  |  101  |  30[H]  ----------------------------<  234[H]  See Note   |  19[L]  |  1.68[H]    Ca    8.6      13 Nov 2024 17:20  Phos  3.6     11-13  Mg     1.7     11-13    TPro  7.0  /  Alb  3.3  /  TBili  0.2  /  DBili  x   /  AST  See Note  /  ALT  See Note  /  AlkPhos  See Note  11-13      Urinalysis Basic - ( 13 Nov 2024 17:20 )    Color: x / Appearance: x / SG: x / pH: x  Gluc: 234 mg/dL / Ketone: x  / Bili: x / Urobili: x   Blood: x / Protein: x / Nitrite: x   Leuk Esterase: x / RBC: x / WBC x   Sq Epi: x / Non Sq Epi: x / Bacteria: x      CAPILLARY BLOOD GLUCOSE      POCT Blood Glucose.: 295 mg/dL (13 Nov 2024 21:56)      RADIOLOGY & ADDITIONAL TESTS: Reviewed. OVERNIGHT EVENTS: LILY.    SUBJECTIVE / INTERVAL HPI: Patient seen and examined at bedside. Pt resting comfortably. States she slept great last night. No acute complaints this morning. Denies fevers, chills, HA, chest pain, SOB, n/v/d, dysuria. ROS otherwise negative.      PHYSICAL EXAM:    General: NAD  HEENT: NC/AT; PERRL, anicteric sclera; MMM  Neck: supple  Cardiovascular: +S1/S2, RRR  Respiratory: CTA B/L; no W/R/R  Gastrointestinal: soft, NT/ND; +BSx4  Extremities: WWP; no edema, clubbing or cyanosis  Vascular: 2+ radial, DP/PT pulses B/L  Neurological: AAOx3; no focal deficits  Psychiatric: pleasant mood and affect  Dermatologic: no appreciable wounds or damage to the skin    VITAL SIGNS:  Vital Signs Last 24 Hrs  T(C): 36.2 (14 Nov 2024 06:30), Max: 36.6 (13 Nov 2024 20:35)  T(F): 97.2 (14 Nov 2024 06:30), Max: 97.9 (13 Nov 2024 20:35)  HR: 88 (14 Nov 2024 06:30) (66 - 88)  BP: 149/86 (14 Nov 2024 06:30) (149/86 - 166/82)  BP(mean): 110 (13 Nov 2024 20:35) (110 - 110)  RR: 18 (14 Nov 2024 06:30) (17 - 18)  SpO2: 98% (14 Nov 2024 06:30) (95% - 98%)    Parameters below as of 13 Nov 2024 20:35  Patient On (Oxygen Delivery Method): room air          MEDICATIONS:  MEDICATIONS  (STANDING):  atorvastatin 40 milliGRAM(s) Oral at bedtime  cefTRIAXone   IVPB 2000 milliGRAM(s) IV Intermittent every 24 hours  dextrose 5%. 1000 milliLiter(s) (100 mL/Hr) IV Continuous <Continuous>  dextrose 5%. 1000 milliLiter(s) (50 mL/Hr) IV Continuous <Continuous>  dextrose 50% Injectable 25 Gram(s) IV Push once  dextrose 50% Injectable 12.5 Gram(s) IV Push once  dextrose 50% Injectable 25 Gram(s) IV Push once  glucagon  Injectable 1 milliGRAM(s) IntraMuscular once  heparin   Injectable 5000 Unit(s) SubCutaneous every 8 hours  insulin glargine Injectable (LANTUS) 28 Unit(s) SubCutaneous at bedtime  insulin lispro (ADMELOG) corrective regimen sliding scale   SubCutaneous Before meals and at bedtime  insulin lispro Injectable (ADMELOG) 17 Unit(s) SubCutaneous three times a day before meals    MEDICATIONS  (PRN):  dextrose Oral Gel 15 Gram(s) Oral once PRN Blood Glucose LESS THAN 70 milliGRAM(s)/deciliter      ALLERGIES:  Allergies    citrus (Urticaria)  Bactrim (Rash)    Intolerances        LABS:                        11.7   6.70  )-----------( 110      ( 13 Nov 2024 17:20 )             36.9     11-13    133[L]  |  101  |  30[H]  ----------------------------<  234[H]  See Note   |  19[L]  |  1.68[H]    Ca    8.6      13 Nov 2024 17:20  Phos  3.6     11-13  Mg     1.7     11-13    TPro  7.0  /  Alb  3.3  /  TBili  0.2  /  DBili  x   /  AST  See Note  /  ALT  See Note  /  AlkPhos  See Note  11-13      Urinalysis Basic - ( 13 Nov 2024 17:20 )    Color: x / Appearance: x / SG: x / pH: x  Gluc: 234 mg/dL / Ketone: x  / Bili: x / Urobili: x   Blood: x / Protein: x / Nitrite: x   Leuk Esterase: x / RBC: x / WBC x   Sq Epi: x / Non Sq Epi: x / Bacteria: x      CAPILLARY BLOOD GLUCOSE      POCT Blood Glucose.: 295 mg/dL (13 Nov 2024 21:56)      RADIOLOGY & ADDITIONAL TESTS: Reviewed.

## 2024-11-14 NOTE — PROGRESS NOTE ADULT - PROBLEM SELECTOR PLAN 10
Hx of chronic RA, doesn't take home meds for it    - lidocaine patches for shoulders b/l  - f/u outpatient PCP

## 2024-11-14 NOTE — PROGRESS NOTE ADULT - PROBLEM SELECTOR PLAN 3
Troponin T 88-->93. likely demand ischemia iso hypotension. Denies any CP, chest discomfort, abdominal pain. EKG with NSR and ST elevation in V2 unchanged from prior EKGs.  Troponin T 71 11/14    - Downtrending

## 2024-11-14 NOTE — PROGRESS NOTE ADULT - PROBLEM SELECTOR PLAN 7
Follows Dr. Delaney at [facility]. receiving chemo and radiation therapy.    - per heme/onc, pt has not had chemotherapy for several weeks due to intermittent forgetfulness  --> pt should restart her treatment, but at this time placement or at least placement home with proper services takes precedence  - f/u heme/onc recs

## 2024-11-14 NOTE — PROGRESS NOTE ADULT - SUBJECTIVE AND OBJECTIVE BOX
INTERVAL HPI/OVERNIGHT EVENTS:  Awake and seems alert  Did not speak with patients daughter yesterday   Glucoses noted         MEDICATIONS  (STANDING):  atorvastatin 40 milliGRAM(s) Oral at bedtime  cefTRIAXone   IVPB 2000 milliGRAM(s) IV Intermittent every 24 hours  dextrose 5%. 1000 milliLiter(s) (100 mL/Hr) IV Continuous <Continuous>  dextrose 5%. 1000 milliLiter(s) (50 mL/Hr) IV Continuous <Continuous>  dextrose 50% Injectable 25 Gram(s) IV Push once  dextrose 50% Injectable 12.5 Gram(s) IV Push once  dextrose 50% Injectable 25 Gram(s) IV Push once  glucagon  Injectable 1 milliGRAM(s) IntraMuscular once  heparin   Injectable 5000 Unit(s) SubCutaneous every 8 hours  insulin glargine Injectable (LANTUS) 28 Unit(s) SubCutaneous at bedtime  insulin lispro (ADMELOG) corrective regimen sliding scale   SubCutaneous Before meals and at bedtime  insulin lispro Injectable (ADMELOG) 17 Unit(s) SubCutaneous three times a day before meals    MEDICATIONS  (PRN):  dextrose Oral Gel 15 Gram(s) Oral once PRN Blood Glucose LESS THAN 70 milliGRAM(s)/deciliter      Allergies    citrus (Urticaria)  Bactrim (Rash)    Intolerances        Vital Signs Last 24 Hrs  T(C): 36.2 (14 Nov 2024 06:30), Max: 36.6 (13 Nov 2024 20:35)  T(F): 97.2 (14 Nov 2024 06:30), Max: 97.9 (13 Nov 2024 20:35)  HR: 88 (14 Nov 2024 06:30) (84 - 88)  BP: 149/86 (14 Nov 2024 06:30) (149/86 - 166/82)  BP(mean): 110 (13 Nov 2024 20:35) (110 - 110)  RR: 18 (14 Nov 2024 06:30) (17 - 18)  SpO2: 98% (14 Nov 2024 06:30) (95% - 98%)    Parameters below as of 13 Nov 2024 20:35  Patient On (Oxygen Delivery Method): room air              Constitutional:  Awake     Eyes: NEDRA    ENMT: Negative    Neck: Supple    Back:  no tenderness     Respiratory:  clear    Cardiovascular: S1 S2    Gastrointestinal:  soft     Genitourinary:    Extremities:  no edema     Vascular:    Neurological:    Skin:    Lymph Nodes:            11-13 @ 07:01 - 11-14 @ 07:00  --------------------------------------------------------  IN: 0 mL / OUT: 700 mL / NET: -700 mL      LABS:                        11.7   6.70  )-----------( 110      ( 13 Nov 2024 17:20 )             36.9     11-13    133[L]  |  101  |  30[H]  ----------------------------<  234[H]  See Note   |  19[L]  |  1.68[H]    Ca    8.6      13 Nov 2024 17:20  Phos  3.6     11-13  Mg     1.7     11-13    TPro  7.0  /  Alb  3.3  /  TBili  0.2  /  DBili  x   /  AST  See Note  /  ALT  See Note  /  AlkPhos  See Note  11-13      Urinalysis Basic - ( 13 Nov 2024 17:20 )    Color: x / Appearance: x / SG: x / pH: x  Gluc: 234 mg/dL / Ketone: x  / Bili: x / Urobili: x   Blood: x / Protein: x / Nitrite: x   Leuk Esterase: x / RBC: x / WBC x   Sq Epi: x / Non Sq Epi: x / Bacteria: x        RADIOLOGY & ADDITIONAL TESTS:

## 2024-11-14 NOTE — CHART NOTE - NSCHARTNOTEFT_GEN_A_CORE
Spoke with patients daughter regarding discharge plas;  She is quite nervous about her leaving without additional help in her home;  Oncology agrees with this as well  Will discuss with staff again in the morning  May have to delay discharge pending obtaining additional services at home

## 2024-11-14 NOTE — PROGRESS NOTE ADULT - PROBLEM SELECTOR PLAN 1
Possibly iso hypotension due to taking BP meds vs. orthostatic hypotension vs. UTI  CTH with no acute intracranial abnormality of significant changes since 10/03/2024. Noted to have similiar AMS on prior admissions.  UA grossly positive with bacteria, leukocyte esterase, WBCs  Currently AOx3, pt more alert today   Orthostatics positive 11/12, s/p 1L NS and additional 500cc NS    - Will check orthostatics again today  - C/w CTX 2g q24hr for UTI (day 3/3)  - PT/OT rec SULMA  - Will discuss with pt's family her baseline mental status, as well as acquiring more support for her at home

## 2024-11-14 NOTE — PROGRESS NOTE ADULT - PROBLEM SELECTOR PLAN 2
UA grossly positive with bacteria, leukocyte esterase, WBCs. Pt without leukocytosis or fever, denies any urinary sxs, but is a poor historian.    - C/w CTX 2g q24hr (day 3/3)

## 2024-11-14 NOTE — PROGRESS NOTE ADULT - SUBJECTIVE AND OBJECTIVE BOX
Physical Medicine and Rehabilitation Progress Note :       Patient is a 74y old  Female who presents with a chief complaint of AMS (14 Nov 2024 10:56)      HPI:  73 y/o F pmhx lung cancer mets to miguel, CKD, HLD, DM2, RA BIBEMS complaining of AMS. Pt states she woke up today around 1:30pm but felt confused. Per ED note, pt showed up at her infusion center on the wrong appointment day and they reported her acting confused. EMS reports patient was hypotensive on their arrival. Pt cannot recall specific reasons/details leading to her admission. Currently, she denies headache, lightheadedness, dizziness, SOB, CP, diarrhea, constipation, increase in thirst, polyuria, nausea or vomiting.    In the ED  Vitals: T 98.1, HR 73, /77 RR 17 SpO2 97%  Labs: WBC 12.13 D-dimer 449, Trops 88-->93 BUN 32 Cr 1.90  CT head: No acute intracranial abnormality or significant adverse change since 10/03/2024  EKG: NSR and ST elevation in V2 unchanged from prior EKGs  Interventions: none   (12 Nov 2024 01:41)                            11.7   6.70  )-----------( 110      ( 13 Nov 2024 17:20 )             36.9       11-13    133[L]  |  101  |  30[H]  ----------------------------<  234[H]  See Note   |  19[L]  |  1.68[H]    Ca    8.6      13 Nov 2024 17:20  Phos  3.6     11-13  Mg     1.7     11-13    TPro  7.0  /  Alb  3.3  /  TBili  0.2  /  DBili  x   /  AST  See Note  /  ALT  See Note  /  AlkPhos  See Note  11-13    Vital Signs Last 24 Hrs  T(C): 36.2 (14 Nov 2024 06:30), Max: 36.6 (13 Nov 2024 20:35)  T(F): 97.2 (14 Nov 2024 06:30), Max: 97.9 (13 Nov 2024 20:35)  HR: 88 (14 Nov 2024 06:30) (84 - 88)  BP: 149/86 (14 Nov 2024 06:30) (149/86 - 166/82)  BP(mean): 110 (13 Nov 2024 20:35) (110 - 110)  RR: 18 (14 Nov 2024 06:30) (17 - 18)  SpO2: 98% (14 Nov 2024 06:30) (95% - 98%)    Parameters below as of 13 Nov 2024 20:35  Patient On (Oxygen Delivery Method): room air        MEDICATIONS  (STANDING):  atorvastatin 40 milliGRAM(s) Oral at bedtime  cefTRIAXone   IVPB 2000 milliGRAM(s) IV Intermittent every 24 hours  dextrose 5%. 1000 milliLiter(s) (50 mL/Hr) IV Continuous <Continuous>  dextrose 5%. 1000 milliLiter(s) (100 mL/Hr) IV Continuous <Continuous>  dextrose 50% Injectable 25 Gram(s) IV Push once  dextrose 50% Injectable 12.5 Gram(s) IV Push once  dextrose 50% Injectable 25 Gram(s) IV Push once  glucagon  Injectable 1 milliGRAM(s) IntraMuscular once  heparin   Injectable 5000 Unit(s) SubCutaneous every 8 hours  insulin glargine Injectable (LANTUS) 28 Unit(s) SubCutaneous at bedtime  insulin lispro (ADMELOG) corrective regimen sliding scale   SubCutaneous Before meals and at bedtime  insulin lispro Injectable (ADMELOG) 17 Unit(s) SubCutaneous three times a day before meals    MEDICATIONS  (PRN):  dextrose Oral Gel 15 Gram(s) Oral once PRN Blood Glucose LESS THAN 70 milliGRAM(s)/deciliter      T(C): 36.2 (11-14-24 @ 06:30), Max: 36.6 (11-13-24 @ 20:35)  HR: 88 (11-14-24 @ 06:30) (84 - 88)  BP: 149/86 (11-14-24 @ 06:30) (149/86 - 166/82)  RR: 18 (11-14-24 @ 06:30) (17 - 18)  SpO2: 98% (11-14-24 @ 06:30) (95% - 98%)    Physical Exam:      74 y o woman lying comfortably in semi Zee's position , awake ,  NAD    Head: normocephalic , atraumatic    Eyes: PERRLA , EOMI , no nystagmus , sclera anicteric    ENT / FACE: neg nasal discharge , uvula midline , no oropharyngeal erythema / exudate    Neck: supple , negative JVD , negative carotid bruits , no thyromegaly    Chest: CTA bilaterally      Cardiovascular: regular rate and rhythm , neg murmurs / rubs / gallops    Abdomen: soft , non distended , no tenderness to palpation in all 4 quadrants ,  normal bowel sounds     Extremities: WWP , neg cyanosis /clubbing / edema       Neurologic Exam:     Alert and oriented to person , place , date/year , speech fluent w/o dysarthria , follows commands , recent and remote memory intact , repetition intact , comprehension intact ,  attention/concentration intact , fund of knowledge appropriate    Cranial Nerves:           II:                         pupils equal , round and reactive to light , visual fields intact         III/ IV/VI:             extraocular movements intact , neg nystagmus , neg ptosis        V:                        facial sensation intact , V1-3 normal        VII:                      face symmetric , no droop , normal eye closure and smile        VIII:                     hearing intact to finger rub bilaterally        IX and X:             no hoarseness , gag intact , palate/ uvula rise symmetrically        XI:                       SCM / trapezius strength intact bilateral        XII:                      no tongue deviation    Motor Exam:        > 3+/5 x 4 extremities , without drift     Sensation:         intact to light touch x 4 extremities                            no neglect or extinction on double simultaneous testing    DTR:           biceps/brachioradialis: equal                            patella/ankle: equal          neg Babinski      Coordination:            Finger to Nose:  neg dysmetria bilaterally        Functional Status Assessment :       Pain Assessment/Number Scale (0-10) Adult  Presence of Pain: denies pain/discomfort (Rating = 0)  Pain Rating (0-10): Rest: 0 (no pain/absence of nonverbal indicators of pain)  Pain Rating (0-10): Activity: 0 (no pain/absence of nonverbal indicators of pain)    Safety      [facility] Functional Assessment: Basic Mobility  Type of Assessment: Daily assessment  Turning from your back to your side while in a flat bed without using bedrails?: 4 = No assist / stand by assistance  Moving from lying on your back to sitting on the flat side of a flat bed without using bedrails?: 4 = No assist / stand by assistance  Moving to and from a bed to a chair (including a wheelchair)?: 4 = No assist / stand by assistance  Standing up from a chair using your arms (e.g. wheelchair or bedside chair)?: 3 = A little assistance  Walking in hospital room?: 3 = A little assistance  Climbing 3-5 steps with a railing?: 3 = A little assistance  Score: 21   Row Comment: Ask the patient "How much help from another person do you currently need? (If the patient hasn't done an activity recently, how much help from another person do you think he/she needs if he/she tried?)    Cognitive/Neuro      Cognitive/Neuro/Behavioral  Cognitive/Neuro/Behavioral [WDL Definition: Alert; opens eyes spontaneously; arouses to voice or touch; oriented x 4; follows commands; speech spontaneous, logical; purposeful motor response; behavior appropriate to situation]: WDL  Level of Consciousness: alert  Arousal Level: arouses to voice  Orientation: oriented x 4  Speech: clear;  spontaneous  Mood/Behavior: calm;  cooperative    Language Assistance  Preferred Language to Address Healthcare Preferred Language to Address Healthcare: English    Therapeutic Interventions      Bed Mobility  Bed Mobility Training Rehab Potential: good, to achieve stated therapy goals  Bed Mobility Training Sit-to-Supine: supervsion;  verbal cues  Bed Mobility Training Supine-to-Sit: contact guard;  1 person assist;  nonverbal cues (demo/gestures);  verbal cues  Bed Mobility Training Limitations: decreased strength;  impaired balance    Sit-Stand Transfer Training  Sit-to-Stand Transfer Training Rehab Potential: good, to achieve stated therapy goals  Transfer Training Sit-to-Stand Transfer: contact guard;  1 person assist;  nonverbal cues (demo/gestures);  verbal cues;  full weight-bearing   rolling walker  Transfer Training Stand-to-Sit Transfer: contact guard;  1 person assist;  nonverbal cues (demo/gestures);  verbal cues;  full weight-bearing   rolling walker  Sit-to-Stand Transfer Training Transfer Safety Analysis: impaired balance;  decreased strength;  rolling walker    Toilet Transfer Training  Toilet Transfer Training Rehab Potential: good, to achieve stated therapy goals  Transfer Training Toilet Transfer: contact guard;  1 person assist;  nonverbal cues (demo/gestures);  verbal cues;  full weight-bearing   grab bars;  rolling walker  Toilet Transfer Training Transfer Safety Analysis: decreased strength;  impaired balance;  required increased time to self cleanup, no loss of balance or buckling noted;  grab bars;  rolling walker    Gait Training  Gait Training Rehab Potential: good, to achieve stated therapy goals  Gait Training: supervsion;  1 person assist;  nonverbal cues (demo/gestures);  verbal cues;  contact guard;  full weight-bearing   rolling walker;  150 feet  Gait Analysis: pt demos good gaby, steady gait, denies fatigue, no loss of balance or buckling noted ;  impaired balance;  decreased strength;  150 feet;  rolling walker            PM&R Impression : as above    Current disposition plan recommendation :    d/c home with home physical and occupational therapy , home assist

## 2024-11-14 NOTE — PROGRESS NOTE ADULT - PROBLEM SELECTOR PLAN 4
Home regiment: lantus 23U when not eating, admelog 14U for regular meal.    - C/w lantus 28u  - C/w lispro to 17u premeal  - miSS  - Monitor FS Home regiment: lantus 23U when not eating, admelog 14U for regular meal.    - C/w lantus 28u units  - Increase lispro to 22u premeal  - miSS  - Monitor FS

## 2024-11-14 NOTE — DISCHARGE NOTE NURSING/CASE MANAGEMENT/SOCIAL WORK - PATIENT PORTAL LINK FT
You can access the FollowMyHealth Patient Portal offered by [facility] by registering at the following website: [email or web address]. By joining [employer]’s FollowMyHealth portal, you will also be able to view your health information using other applications (apps) compatible with our system.

## 2024-11-14 NOTE — PROGRESS NOTE ADULT - ASSESSMENT
{\rtf1\rluwpi35544\ansi\rnyusgw1621\ftnbj\uc1\deff0  {\fonttbl{\f0 \fnil Segoe UI;}{\f1 \fnil \fcharset0 Segoe UI;}{\f2 \fnil Times New Aydin;}}  {\colortbl ;\fly538\lchov375\mqcy006 ;\red0\green0\blue0 ;\red0\green0\khzj788 ;\red0\green0\blue0 ;}  {\stylesheet{\f0\fs20 Normal;}{\cs1 Default Paragraph Font;}{\cs2\f0\fs16 Line Number;}{\cs3\f2\fs24\ul\cf3 Hyperlink;}}  {\*\revtbl{Unknown;}}  \wrnnyg93570\abqnhl12384\vwbjn9170\uuxmp0728\deegz5379\zxdei8639\uhmgoss889\mdxdbdx375\nogrowautofit\lncnix370\formshade\nofeaturethrottle1\dntblnsbdb\fet4\aendnotes\aftnnrlc\pgbrdrhead\pgbrdrfoot  \sectd\qewuqc38257\rtjkjn99872\guttersxn0\ljgstluf1590\jldxpvcz1966\zgszqnps7271\vnomogpr5876\ejvnegz542\bgbdwqm807\sbkpage\pgncont\pgndec  \plain\plain\f0\fs24\ql\plain\f0\fs24\plain\f0\fs20\zlom2053\hich\f0\dbch\f0\loch\f0\fs20\par  I M\par  \par  74 y o F PMHX lung cancer mets to brain, CKD, HLD, DM2, RA BIBEMS for AMS iso hypotension.\par  \par  \plain\f1\fs20\hoop2582\hich\f1\dbch\f1\loch\f1\cf2\fs20\ul{\field{\*\fldinst HYPERLINK [id number] }{\fldrslt Problem/Plan - 1:}}\plain\f0\fs20\xtzt2616\hich\f0\dbch\f0\loch\f0\fs20\ql\par  \'b7  {\*\bkmkstart yj78820522039}{\*\bkmkend jf62739724519}Problem: {\*\bkmkstart vc76721321016}{\*\bkmkend or55842116777}Altered mental status. \par  \'b7  {\*\bkmkstart cj73352357517}{\*\bkmkend xi64195764413}Plan: {\*\bkmkstart kt15051019408}{\*\bkmkend zo56284795551}Possibly iso hypotension due to taking BP meds vs. orthostatic hypotension vs. UTI\par  CTH with no acute intracranial abnormality of significant changes since 10/03/2024. Noted to have similiar AMS on prior admissions.\par  UA grossly positive with bacteria, leukocyte esterase, WBCs\par  Currently AOx2-3\par  Orthostatics positive 11/12, s/p 1L NS\par  \par  - Orthostatics positive today, will give another  cc bolus\par  - C/w CTX 2g q24hr for UTI\par  - PT/OT rec SULMA\par  - Will discuss with pt's family her baseline mental status, as well as acquiring more support for her at home.\par  \par  \plain\f1\fs20\pqmi8130\hich\f1\dbch\f1\loch\f1\cf2\fs20\ul{\field{\*\fldinst HYPERLINK [id number] }{\fldrslt Problem/Plan - 2:}}\plain\f0\fs20\eudl8214\hich\f0\dbch\f0\loch\f0\fs20\ql\par  \'b7  {\*\bkmkstart jk85583089808}{\*\bkmkend zq70922691282}Problem: {\*\bkmkstart gx40098304638}{\*\bkmkend mg22968216967}Acute UTI. \par  \'b7  {\*\bkmkstart vk04523769693}{\*\bkmkend at96516356378}Plan: {\*\bkmkstart ef31966834778}{\*\bkmkend lc71238911404}UA grossly positive with bacteria, leukocyte esterase, WBCs. Pt without leukocytosis or fever, denies any urinary sxs, but is a poor   historian.\par  \par  - C/w CTX 2g q24hr.\par  \par  \plain\f1\fs20\eftl2553\hich\f1\dbch\f1\loch\f1\cf2\fs20\ul{\field{\*\fldinst HYPERLINK [id number] }{\fldrslt Problem/Plan - 3:}}\plain\f0\fs20\jgpu9374\hich\f0\dbch\f0\loch\f0\fs20\ql\par  \'b7  {\*\bkmkstart xo87820271672}{\*\bkmkend rc35436803603}Problem: {\*\bkmkstart le52081612275}{\*\bkmkend zj81028274211}Elevated troponin. \par  \'b7  {\*\bkmkstart te58512344827}{\*\bkmkend vt68807778115}Plan: {\*\bkmkstart an00692848227}{\*\bkmkend ue64510362298}Troponin T 88-->93. likely demand ischemia iso hypotension. Denies any CP, chest discomfort, abdominal pain. EKG with NSR and ST elevation   in V2 unchanged from prior EKGs.\par  Troponin T 71 this AM\par  \par  - CTM.\par  \par  \plain\f1\fs20\evsn2966\hich\f1\dbch\f1\loch\f1\cf2\fs20\ul{\field{\*\fldinst HYPERLINK [id number] }{\fldrslt Problem/Plan - 4:}}\plain\f0\fs20\vqwh1270\hich\f0\dbch\f0\loch\f0\fs20\ql\par  \'b7  {\*\bkmkstart dq77805537064}{\*\bkmkend by91113620679}Problem: {\*\bkmkstart st29699441551}{\*\bkmkend pr00816113199}Hyperglycemia. \par  \'b7  {\*\bkmkstart mo17789623328}{\*\bkmkend fd00304974672}Plan: {\*\bkmkstart aj23584255998}{\*\bkmkend kx24212592114}Home regiment: lantus 23U when not eating, admelog 14U for regular meal.\par  \par  - Increase lantus 28u\par  - Increase lispro to 17u premeal\par  - miSS\par  - Monitor FS.\par  \par  \plain\f1\fs20\mfoy6071\hich\f1\dbch\f1\loch\f1\cf2\fs20\ul{\field{\*\fldinst HYPERLINK [id number] }{\fldrslt Problem/Plan - 5:}}\plain\f0\fs20\gwki7596\hich\f0\dbch\f0\loch\f0\fs20\ql\par  \'b7  {\*\bkmkstart sd75447743897}{\*\bkmkend il47601864467}Problem: {\*\bkmkstart sc56671924340}{\*\bkmkend wx76278317003}Diabetes mellitus. \par  \'b7  {\*\bkmkstart ce51796863750}{\*\bkmkend ar04031950381}Plan: {\*\bkmkstart nt18139157276}{\*\bkmkend pv08825127398}Poorly controlled T2DM, most recent A1C 7/31/24 12.4. likely d/t med noncompliance iso cognitive problems, metastatic lung cancer to   brain vs. hypovolemia\par  Home regiment: lantus 23U when not eating, admelog 14U for regular meal.\par  \par  - f/u A1c\par  - as above.\par  \par  \plain\f1\fs20\awon5926\hich\f1\dbch\f1\loch\f1\cf2\fs20\ul{\field{\*\fldinst HYPERLINK [id number] }{\fldrslt Problem/Plan - 6:}}\plain\f0\fs20\xbcy5922\hich\f0\dbch\f0\loch\f0\fs20\ql\par  \'b7  {\*\bkmkstart yo48372330091}{\*\bkmkend yp29240699658}Problem: {\*\bkmkstart ko48177385948}{\*\bkmkend oa01517950204}Acute kidney injury superimposed on CKD. \par  \'b7  {\*\bkmkstart bf78425321026}{\*\bkmkend uc59057126175}Plan: {\*\bkmkstart yi24591864624}{\*\bkmkend qg01855509977}Baseline Cr 1.56. On admission 1.90, \par  FeNa 0.4%, prerenal\par  \par  - Continue to trend Cr\par  - Avoid nephrotoxic agents\par  - Adjust medication dosages for level of renal function.\par  \par  \plain\f1\fs20\fayj7319\hich\f1\dbch\f1\loch\f1\cf2\fs20\ul{\field{\*\fldinst HYPERLINK [id number] }{\fldrslt Problem/Plan - 7:}}\plain\f0\fs20\ykza7353\hich\f0\dbch\f0\loch\f0\fs20\ql\par  \'b7  {\*\bkmkstart sl24808340190}{\*\bkmkend ry43542026562}Problem: {\*\bkmkstart hq54309957042}{\*\bkmkend ej24999478528}NSCLC metastatic to brain. \par  \'b7  {\*\bkmkstart sp97912289240}{\*\bkmkend tt64293608352}Plan: {\*\bkmkstart xa33163855582}{\*\bkmkend ak71167193192}Follows Dr. Delaney at [facility]. receiving chemo and radiation therapy.\par  \par  - per heme/onc, pt has not had chemotherapy for several weeks due to intermittent forgetfulness  --> pt should restart her treatment, but at this time placement or at least placement home with proper services takes precedence\par  - f/u heme/onc recs.\plain\f1\fs20\bpak3950\hich\f1\dbch\f1\loch\f1\cf2\fs20\strike\plain\f0\fs20\bxxi7464\hich\f0\dbch\f0\loch\f0\fs20\par  \par  \plain\f1\fs20\lruf3158\hich\f1\dbch\f1\loch\f1\cf2\fs20\ul{\field{\*\fldinst HYPERLINK [id number] }{\fldrslt Problem/Plan - 8:}}\plain\f0\fs20\vqex9052\hich\f0\dbch\f0\loch\f0\fs20\ql\par  \'b7  {\*\bkmkstart tk57330944974}{\*\bkmkend nr84438274270}Problem: {\*\bkmkstart wu74726306823}{\*\bkmkend qs05766011119}Hypertension. \par  \'b7  {\*\bkmkstart wi58579329388}{\*\bkmkend wy22532996054}Plan: {\*\bkmkstart ba99243001610}{\*\bkmkend tc76087905091}Last admission, nifedipine ER 60mg. But claims that she takes a medication that starts with "X"\par  \par  - ctm BPs\par  - hold BP meds iso hypotension on arrival, resume as tolerated.\plain\f1\fs20\kupd2889\hich\f1\dbch\f1\loch\f1\cf2\fs20\strike\plain\f0\fs20\vkhh6682\hich\f0\dbch\f0\loch\f0\fs20\par  \par  \plain\f1\fs20\dpto7750\hich\f1\dbch\f1\loch\f1\cf2\fs20\ul{\field{\*\fldinst HYPERLINK [id number] }{\fldrslt Problem/Plan - 9:}}\plain\f0\fs20\jctu0815\hich\f0\dbch\f0\loch\f0\fs20\ql\par  \'b7  {\*\bkmkstart rq35991094477}{\*\bkmkend xh23367216251}Problem: {\*\bkmkstart mm52472265840}{\*\bkmkend uj56015718219}Hyperlipidemia. \par  \'b7  {\*\bkmkstart fp92492776576}{\*\bkmkend nb50212743840}Plan: {\*\bkmkstart ul60831833677}{\*\bkmkend iu98671789623}Home atorvastatin 40mg qD.\par  \par  - c/w home meds.\plain\f1\fs20\tktj1598\hich\f1\dbch\f1\loch\f1\cf2\fs20\strike\plain\f0\fs20\ptef7249\hich\f0\dbch\f0\loch\f0\fs20\par  \par  \plain\f1\fs20\edhl0167\hich\f1\dbch\f1\loch\f1\cf2\fs20\ul{\field{\*\fldinst HYPERLINK [id number] }{\fldrslt Problem/Plan - 10:}}\plain\f0\fs20\kplr3936\hich\f0\dbch\f0\loch\f0\fs20\ql\par  \'b7  {\*\bkmkstart ch52474219852}{\*\bkmkend oo83583929213}Problem: {\*\bkmkstart hw95968461779}{\*\bkmkend lb72824169112}Rheumatoid arthritis. \par  \'b7  {\*\bkmkstart re13420323744}{\*\bkmkend of35200779819}Plan; {\*\bkmkstart oa03586956229}{\*\bkmkend ec13383950361}Hx of chronic RA, doesn't take home meds for it\par  \par  - f/u outpatient PCP.\plain\f1\fs20\nllu3949\hich\f1\dbch\f1\loch\f1\cf2\fs20\strike\plain\f0\fs20\kzxr4644\hich\f0\dbch\f0\loch\f0\fs20\par  \par  \plain\f1\fs20\cdre6124\hich\f1\dbch\f1\loch\f1\cf2\fs20\ul{\field{\*\fldinst HYPERLINK [id number] }{\fldrslt Problem/Plan - 11:}}\plain\f0\fs20\yehz6101\hich\f0\dbch\f0\loch\f0\fs20\ql\par  \'b7  {\*\bkmkstart dy434392675554}{\*\bkmkend rn533646641219}Problem: {\*\bkmkstart qh990132753142}{\*\bkmkend fp311892570052}Elevated d-dimer. \par  \'b7  {\*\bkmkstart bi544536844917}{\*\bkmkend zl736818657134}Plan: {\*\bkmkstart fc934949681697}{\*\bkmkend mo436459574311}w/ age corrected, wnl. Also possible elevated iso RA.\par  \par  \plain\f1\fs20\ftxd2602\hich\f1\dbch\f1\loch\f1\cf2\fs20\ul{\field{\*\fldinst HYPERLINK [id number] }{\fldrslt Problem/Plan - 12:}}\plain\f0\fs20\xnrb3736\hich\f0\dbch\f0\loch\f0\fs20\ql\par  \'b7  {\*\bkmkstart fy960648957273}{\*\bkmkend tj008146152248}Problem: {\*\bkmkstart ad088894848611}{\*\bkmkend uv594740456500}Prophylactic measure. \par  \'b7  {\*\bkmkstart aq748100380745}{\*\bkmkend wm949230710727}Plan: {\*\bkmkstart ng356322209632}{\*\bkmkend qh837854632032}Fluids: none\par  Electrolytes: Mg >2, K >4\par  Nutrition: consistent carb\par  Prophylaxis: lovenox\par  Activity: fall risk\par  GI: none\par  C: FC\par  Dispo: RMF.\par  \par  \par  \par  \par  \plain\f1\fs16\wlph5085\hich\f1\dbch\f1\loch\f1\cf2\fs16\par  \plain\f0\fs20\fbza1694\hich\f0\dbch\f0\loch\f0\fs20\par  }

## 2024-11-14 NOTE — DISCHARGE NOTE NURSING/CASE MANAGEMENT/SOCIAL WORK - FINANCIAL ASSISTANCE
[facility] provides services at a reduced cost to those who are determined to be eligible through [facility]’s financial assistance program. Information regarding [facility]’s financial assistance program can be found by going to [email or web address] or by calling [phone].

## 2024-11-14 NOTE — PROGRESS NOTE ADULT - PROBLEM SELECTOR PLAN 6
Baseline Cr 1.56. On admission 1.90,   FeNa 0.4%, prerenal    - Continue to trend Cr  - Avoid nephrotoxic agents  - Adjust medication dosages for level of renal function

## 2024-11-14 NOTE — PROGRESS NOTE ADULT - TIME BILLING
Patient seen and examined;  Will discuss with patients daughter plans regarding discharge   Glucose  control still an issue   Remains  on antibiotics

## 2024-11-15 LAB
ANION GAP SERPL CALC-SCNC: 11 MMOL/L — SIGNIFICANT CHANGE UP (ref 5–17)
BUN SERPL-MCNC: 36 MG/DL — HIGH (ref 7–23)
CALCIUM SERPL-MCNC: 9.4 MG/DL — SIGNIFICANT CHANGE UP (ref 8.4–10.5)
CHLORIDE SERPL-SCNC: 106 MMOL/L — SIGNIFICANT CHANGE UP (ref 96–108)
CO2 SERPL-SCNC: 27 MMOL/L — SIGNIFICANT CHANGE UP (ref 22–31)
CREAT SERPL-MCNC: 1.69 MG/DL — HIGH (ref 0.5–1.3)
EGFR: 31 ML/MIN/1.73M2 — LOW
GLUCOSE SERPL-MCNC: 88 MG/DL — SIGNIFICANT CHANGE UP (ref 70–99)
HCT VFR BLD CALC: 38.2 % — SIGNIFICANT CHANGE UP (ref 34.5–45)
HGB BLD-MCNC: 11.8 G/DL — SIGNIFICANT CHANGE UP (ref 11.5–15.5)
MAGNESIUM SERPL-MCNC: 2.1 MG/DL — SIGNIFICANT CHANGE UP (ref 1.6–2.6)
MCHC RBC-ENTMCNC: 28.9 PG — SIGNIFICANT CHANGE UP (ref 27–34)
MCHC RBC-ENTMCNC: 30.9 G/DL — LOW (ref 32–36)
MCV RBC AUTO: 93.6 FL — SIGNIFICANT CHANGE UP (ref 80–100)
NRBC # BLD: 0 /100 WBCS — SIGNIFICANT CHANGE UP (ref 0–0)
PHOSPHATE SERPL-MCNC: 3.8 MG/DL — SIGNIFICANT CHANGE UP (ref 2.5–4.5)
PLATELET # BLD AUTO: 195 K/UL — SIGNIFICANT CHANGE UP (ref 150–400)
POTASSIUM SERPL-MCNC: 4.6 MMOL/L — SIGNIFICANT CHANGE UP (ref 3.5–5.3)
POTASSIUM SERPL-SCNC: 4.6 MMOL/L — SIGNIFICANT CHANGE UP (ref 3.5–5.3)
RBC # BLD: 4.08 M/UL — SIGNIFICANT CHANGE UP (ref 3.8–5.2)
RBC # FLD: 15.5 % — HIGH (ref 10.3–14.5)
SODIUM SERPL-SCNC: 144 MMOL/L — SIGNIFICANT CHANGE UP (ref 135–145)
WBC # BLD: 7.24 K/UL — SIGNIFICANT CHANGE UP (ref 3.8–10.5)
WBC # FLD AUTO: 7.24 K/UL — SIGNIFICANT CHANGE UP (ref 3.8–10.5)

## 2024-11-15 PROCEDURE — 99232 SBSQ HOSP IP/OBS MODERATE 35: CPT | Mod: GC

## 2024-11-15 RX ORDER — ACETAMINOPHEN 500MG 500 MG/1
650 TABLET, COATED ORAL ONCE
Refills: 0 | Status: COMPLETED | OUTPATIENT
Start: 2024-11-15 | End: 2024-11-15

## 2024-11-15 RX ORDER — INSULIN GLARGINE 100 [IU]/ML
26 INJECTION, SOLUTION SUBCUTANEOUS AT BEDTIME
Refills: 0 | Status: DISCONTINUED | OUTPATIENT
Start: 2024-11-15 | End: 2024-11-20

## 2024-11-15 RX ADMIN — Medication 5000 UNIT(S): at 13:39

## 2024-11-15 RX ADMIN — Medication 20 UNIT(S): at 18:02

## 2024-11-15 RX ADMIN — Medication 20 UNIT(S): at 12:58

## 2024-11-15 RX ADMIN — ACETAMINOPHEN 500MG 650 MILLIGRAM(S): 500 TABLET, COATED ORAL at 08:39

## 2024-11-15 RX ADMIN — LIDOCAINE 1 PATCH: 40 CREAM TOPICAL at 18:34

## 2024-11-15 RX ADMIN — LIDOCAINE 1 PATCH: 40 CREAM TOPICAL at 01:25

## 2024-11-15 RX ADMIN — LIDOCAINE 1 PATCH: 40 CREAM TOPICAL at 11:29

## 2024-11-15 RX ADMIN — Medication 22 UNIT(S): at 08:39

## 2024-11-15 RX ADMIN — LIDOCAINE 1 PATCH: 40 CREAM TOPICAL at 11:30

## 2024-11-15 RX ADMIN — ACETAMINOPHEN 500MG 650 MILLIGRAM(S): 500 TABLET, COATED ORAL at 09:40

## 2024-11-15 RX ADMIN — AMLODIPINE BESYLATE 5 MILLIGRAM(S): 10 TABLET ORAL at 07:25

## 2024-11-15 NOTE — PROGRESS NOTE ADULT - PROBLEM SELECTOR PLAN 4
Home regiment: lantus 23U when not eating, admelog 14U for regular meal.    - C/w lantus 28u units  - Increase lispro to 22u premeal  - miSS  - Monitor FS Home regiment: lantus 23U when not eating, admelog 14U for regular meal.  Pt now with borderline low blood glucose    - C/w lantus 28u units  - Decrease lispro to 20u premeal  - miSS  - Monitor FS

## 2024-11-15 NOTE — PROGRESS NOTE ADULT - PROBLEM SELECTOR PLAN 8
Last admission, nifedipine ER 60mg. But claims that she takes a medication that starts with "X"    - ctm BPs  - hold BP meds iso hypotension on arrival, resume as tolerated Last admission, nifedipine ER 60mg. But claims that she takes a medication that starts with "X"    - CTM BPs  - C/w amlodipine 5mg qd

## 2024-11-15 NOTE — PROGRESS NOTE ADULT - SUBJECTIVE AND OBJECTIVE BOX
INTERVAL HPI/OVERNIGHT EVENTS:  Awake with some confusion;  Glucoses noted;  As stated I spoke with daughter yesterday and quite concerned about her going home without more help  Likely will need adjustment of insulin again since they are borderline low this morning   Will contact oncology today also      MEDICATIONS  (STANDING):  amLODIPine   Tablet 5 milliGRAM(s) Oral every 24 hours  atorvastatin 40 milliGRAM(s) Oral at bedtime  dextrose 5%. 1000 milliLiter(s) (50 mL/Hr) IV Continuous <Continuous>  dextrose 5%. 1000 milliLiter(s) (100 mL/Hr) IV Continuous <Continuous>  dextrose 50% Injectable 25 Gram(s) IV Push once  dextrose 50% Injectable 12.5 Gram(s) IV Push once  dextrose 50% Injectable 25 Gram(s) IV Push once  glucagon  Injectable 1 milliGRAM(s) IntraMuscular once  heparin   Injectable 5000 Unit(s) SubCutaneous every 8 hours  insulin glargine Injectable (LANTUS) 28 Unit(s) SubCutaneous at bedtime  insulin lispro (ADMELOG) corrective regimen sliding scale   SubCutaneous Before meals and at bedtime  insulin lispro Injectable (ADMELOG) 20 Unit(s) SubCutaneous three times a day before meals  lidocaine   4% Patch 1 Patch Transdermal every 24 hours  lidocaine   4% Patch 1 Patch Transdermal every 24 hours    MEDICATIONS  (PRN):  dextrose Oral Gel 15 Gram(s) Oral once PRN Blood Glucose LESS THAN 70 milliGRAM(s)/deciliter      Allergies    citrus (Urticaria)  Bactrim (Rash)    Intolerances        Vital Signs Last 24 Hrs  T(C): 36.9 (15 Nov 2024 05:47), Max: 37 (14 Nov 2024 20:44)  T(F): 98.4 (15 Nov 2024 05:47), Max: 98.6 (14 Nov 2024 20:44)  HR: 63 (15 Nov 2024 06:37) (61 - 83)  BP: 188/84 (15 Nov 2024 06:37) (133/70 - 188/84)  BP(mean): 111 (15 Nov 2024 05:47) (91 - 111)  RR: 17 (15 Nov 2024 05:47) (17 - 18)  SpO2: 97% (15 Nov 2024 05:47) (96% - 98%)    Parameters below as of 15 Nov 2024 05:47  Patient On (Oxygen Delivery Method): room air              Constitutional:  Awake     Eyes: NEDRA    ENMT: Negative    Neck: Supple    Back:  no tenderness     Respiratory:  clear    Cardiovascular: S1 S2    Gastrointestinal:  soft     Genitourinary:    Extremities:  no edema     Vascular:    Neurological:    Skin:    Lymph Nodes:            11-14 @ 07:01  -  11-15 @ 07:00  --------------------------------------------------------  IN: 0 mL / OUT: 550 mL / NET: -550 mL      LABS:                        11.8   7.24  )-----------( 195      ( 15 Nov 2024 07:54 )             38.2     11-15    144  |  106  |  36[H]  ----------------------------<  88  4.6   |  27  |  1.69[H]    Ca    9.4      15 Nov 2024 07:54  Phos  3.8     11-15  Mg     2.1     11-15    TPro  7.0  /  Alb  3.3  /  TBili  0.2  /  DBili  x   /  AST  See Note  /  ALT  See Note  /  AlkPhos  See Note  11-13      Urinalysis Basic - ( 15 Nov 2024 07:54 )    Color: x / Appearance: x / SG: x / pH: x  Gluc: 88 mg/dL / Ketone: x  / Bili: x / Urobili: x   Blood: x / Protein: x / Nitrite: x   Leuk Esterase: x / RBC: x / WBC x   Sq Epi: x / Non Sq Epi: x / Bacteria: x        RADIOLOGY & ADDITIONAL TESTS:

## 2024-11-15 NOTE — PROGRESS NOTE ADULT - SUBJECTIVE AND OBJECTIVE BOX
OVERNIGHT EVENTS:    SUBJECTIVE / INTERVAL HPI: Patient seen and examined at bedside.       PHYSICAL EXAM:    General: NAD  HEENT: NC/AT; PERRL, anicteric sclera; MMM  Neck: supple  Cardiovascular: +S1/S2, RRR  Respiratory: CTA B/L; no W/R/R  Gastrointestinal: soft, NT/ND; +BSx4  Extremities: WWP; no edema, clubbing or cyanosis  Vascular: 2+ radial, DP/PT pulses B/L  Neurological: AAOx3; no focal deficits  Psychiatric: pleasant mood and affect  Dermatologic: no appreciable wounds or damage to the skin    VITAL SIGNS:  Vital Signs Last 24 Hrs  T(C): 36.9 (15 Nov 2024 05:47), Max: 37 (14 Nov 2024 20:44)  T(F): 98.4 (15 Nov 2024 05:47), Max: 98.6 (14 Nov 2024 20:44)  HR: 63 (15 Nov 2024 06:37) (61 - 83)  BP: 188/84 (15 Nov 2024 06:37) (133/70 - 188/84)  BP(mean): 111 (15 Nov 2024 05:47) (91 - 111)  RR: 17 (15 Nov 2024 05:47) (17 - 18)  SpO2: 97% (15 Nov 2024 05:47) (96% - 98%)    Parameters below as of 15 Nov 2024 05:47  Patient On (Oxygen Delivery Method): room air          MEDICATIONS:  MEDICATIONS  (STANDING):  amLODIPine   Tablet 5 milliGRAM(s) Oral every 24 hours  atorvastatin 40 milliGRAM(s) Oral at bedtime  dextrose 5%. 1000 milliLiter(s) (50 mL/Hr) IV Continuous <Continuous>  dextrose 5%. 1000 milliLiter(s) (100 mL/Hr) IV Continuous <Continuous>  dextrose 50% Injectable 25 Gram(s) IV Push once  dextrose 50% Injectable 12.5 Gram(s) IV Push once  dextrose 50% Injectable 25 Gram(s) IV Push once  glucagon  Injectable 1 milliGRAM(s) IntraMuscular once  heparin   Injectable 5000 Unit(s) SubCutaneous every 8 hours  insulin glargine Injectable (LANTUS) 28 Unit(s) SubCutaneous at bedtime  insulin lispro (ADMELOG) corrective regimen sliding scale   SubCutaneous Before meals and at bedtime  insulin lispro Injectable (ADMELOG) 22 Unit(s) SubCutaneous three times a day before meals  lidocaine   4% Patch 1 Patch Transdermal every 24 hours  lidocaine   4% Patch 1 Patch Transdermal every 24 hours    MEDICATIONS  (PRN):  dextrose Oral Gel 15 Gram(s) Oral once PRN Blood Glucose LESS THAN 70 milliGRAM(s)/deciliter      ALLERGIES:  Allergies    citrus (Urticaria)  Bactrim (Rash)    Intolerances        LABS:                        11.7   6.70  )-----------( 110      ( 13 Nov 2024 17:20 )             36.9     11-13    133[L]  |  101  |  30[H]  ----------------------------<  234[H]  See Note   |  19[L]  |  1.68[H]    Ca    8.6      13 Nov 2024 17:20  Phos  3.6     11-13  Mg     1.7     11-13    TPro  7.0  /  Alb  3.3  /  TBili  0.2  /  DBili  x   /  AST  See Note  /  ALT  See Note  /  AlkPhos  See Note  11-13      Urinalysis Basic - ( 13 Nov 2024 17:20 )    Color: x / Appearance: x / SG: x / pH: x  Gluc: 234 mg/dL / Ketone: x  / Bili: x / Urobili: x   Blood: x / Protein: x / Nitrite: x   Leuk Esterase: x / RBC: x / WBC x   Sq Epi: x / Non Sq Epi: x / Bacteria: x      CAPILLARY BLOOD GLUCOSE      POCT Blood Glucose.: 92 mg/dL (14 Nov 2024 21:48)      RADIOLOGY & ADDITIONAL TESTS: Reviewed. OVERNIGHT EVENTS: LILY.    SUBJECTIVE / INTERVAL HPI: Patient seen and examined at bedside. Pt resting comfortably. Endorses HA this AM, which she says is chronic for her. States it is located on her L forehead. Denies fevers, chills, HA, chest pain, SOB, n/v/d, abd pain, dysuria. ROS otherwise negative.      PHYSICAL EXAM:    General: NAD  HEENT: NC/AT; PERRL, anicteric sclera; MMM  Neck: supple  Cardiovascular: +S1/S2, RRR  Respiratory: CTA B/L; no W/R/R  Gastrointestinal: soft, NT/ND; +BSx4  Extremities: WWP; no edema, clubbing or cyanosis  Vascular: 2+ radial, DP/PT pulses B/L  Neurological: AAOx3; no focal deficits  Psychiatric: pleasant mood and affect  Dermatologic: no appreciable wounds or damage to the skin    VITAL SIGNS:  Vital Signs Last 24 Hrs  T(C): 36.9 (15 Nov 2024 05:47), Max: 37 (14 Nov 2024 20:44)  T(F): 98.4 (15 Nov 2024 05:47), Max: 98.6 (14 Nov 2024 20:44)  HR: 63 (15 Nov 2024 06:37) (61 - 83)  BP: 188/84 (15 Nov 2024 06:37) (133/70 - 188/84)  BP(mean): 111 (15 Nov 2024 05:47) (91 - 111)  RR: 17 (15 Nov 2024 05:47) (17 - 18)  SpO2: 97% (15 Nov 2024 05:47) (96% - 98%)    Parameters below as of 15 Nov 2024 05:47  Patient On (Oxygen Delivery Method): room air          MEDICATIONS:  MEDICATIONS  (STANDING):  amLODIPine   Tablet 5 milliGRAM(s) Oral every 24 hours  atorvastatin 40 milliGRAM(s) Oral at bedtime  dextrose 5%. 1000 milliLiter(s) (50 mL/Hr) IV Continuous <Continuous>  dextrose 5%. 1000 milliLiter(s) (100 mL/Hr) IV Continuous <Continuous>  dextrose 50% Injectable 25 Gram(s) IV Push once  dextrose 50% Injectable 12.5 Gram(s) IV Push once  dextrose 50% Injectable 25 Gram(s) IV Push once  glucagon  Injectable 1 milliGRAM(s) IntraMuscular once  heparin   Injectable 5000 Unit(s) SubCutaneous every 8 hours  insulin glargine Injectable (LANTUS) 28 Unit(s) SubCutaneous at bedtime  insulin lispro (ADMELOG) corrective regimen sliding scale   SubCutaneous Before meals and at bedtime  insulin lispro Injectable (ADMELOG) 22 Unit(s) SubCutaneous three times a day before meals  lidocaine   4% Patch 1 Patch Transdermal every 24 hours  lidocaine   4% Patch 1 Patch Transdermal every 24 hours    MEDICATIONS  (PRN):  dextrose Oral Gel 15 Gram(s) Oral once PRN Blood Glucose LESS THAN 70 milliGRAM(s)/deciliter      ALLERGIES:  Allergies    citrus (Urticaria)  Bactrim (Rash)    Intolerances        LABS:                        11.7   6.70  )-----------( 110      ( 13 Nov 2024 17:20 )             36.9     11-13    133[L]  |  101  |  30[H]  ----------------------------<  234[H]  See Note   |  19[L]  |  1.68[H]    Ca    8.6      13 Nov 2024 17:20  Phos  3.6     11-13  Mg     1.7     11-13    TPro  7.0  /  Alb  3.3  /  TBili  0.2  /  DBili  x   /  AST  See Note  /  ALT  See Note  /  AlkPhos  See Note  11-13      Urinalysis Basic - ( 13 Nov 2024 17:20 )    Color: x / Appearance: x / SG: x / pH: x  Gluc: 234 mg/dL / Ketone: x  / Bili: x / Urobili: x   Blood: x / Protein: x / Nitrite: x   Leuk Esterase: x / RBC: x / WBC x   Sq Epi: x / Non Sq Epi: x / Bacteria: x      CAPILLARY BLOOD GLUCOSE      POCT Blood Glucose.: 92 mg/dL (14 Nov 2024 21:48)      RADIOLOGY & ADDITIONAL TESTS: Reviewed.

## 2024-11-15 NOTE — PROGRESS NOTE ADULT - PROBLEM SELECTOR PLAN 1
Possibly iso hypotension due to taking BP meds vs. orthostatic hypotension vs. UTI  CTH with no acute intracranial abnormality of significant changes since 10/03/2024. Noted to have similiar AMS on prior admissions.  UA grossly positive with bacteria, leukocyte esterase, WBCs  Currently AOx3, pt more alert today   Orthostatics positive 11/12, s/p 1L NS and additional 500cc NS    - Will check orthostatics again today  - C/w CTX 2g q24hr for UTI (day 3/3)  - PT/OT rec SULMA  - Will discuss with pt's family her baseline mental status, as well as acquiring more support for her at home Possibly iso hypotension due to taking BP meds vs. orthostatic hypotension vs. UTI vs. uncontrolled BS  CTH with no acute intracranial abnormality of significant changes since 10/03/2024. Noted to have similar AMS on prior admissions.  UA grossly positive with bacteria, leukocyte esterase, WBCs  Currently AOx3, pt more alert today, though pt has had waxing/waning mental status  Orthostatics positive 11/12, s/p 1L NS and additional 500cc NS    - S/p CTX 2g q24hr for UTI x 3d  - PT/OT rec SULMA  - Will discuss with pt's family her baseline mental status, as well as acquiring more support for her at home  - Started amlodipine 5mg qd, will continue in AM

## 2024-11-15 NOTE — PROGRESS NOTE ADULT - PROBLEM SELECTOR PLAN 7
Follows Dr. Delaney at [facility]. receiving chemo and radiation therapy.    - per heme/onc, pt has not had chemotherapy for several weeks due to intermittent forgetfulness  --> pt should restart her treatment, but at this time placement or at least placement home with proper services takes precedence  - f/u heme/onc recs

## 2024-11-15 NOTE — PROGRESS NOTE ADULT - TIME BILLING
Patient seen and examined  Still reluctant to discharge patient because of minimal help at home;  Will likely need insulin adjustment again today  Will call oncology today

## 2024-11-15 NOTE — PROGRESS NOTE ADULT - PROBLEM SELECTOR PLAN 2
UA grossly positive with bacteria, leukocyte esterase, WBCs. Pt without leukocytosis or fever, denies any urinary sxs, but is a poor historian.    - C/w CTX 2g q24hr (day 3/3) UA grossly positive with bacteria, leukocyte esterase, WBCs. Pt without leukocytosis or fever, denies any urinary sxs, but is a poor historian.    - S/p CTX 2g q24hr x 3d

## 2024-11-16 PROCEDURE — 99232 SBSQ HOSP IP/OBS MODERATE 35: CPT | Mod: GC

## 2024-11-16 RX ORDER — ACETAMINOPHEN 500MG 500 MG/1
650 TABLET, COATED ORAL ONCE
Refills: 0 | Status: COMPLETED | OUTPATIENT
Start: 2024-11-16 | End: 2024-11-16

## 2024-11-16 RX ORDER — ACETAMINOPHEN 500MG 500 MG/1
650 TABLET, COATED ORAL EVERY 6 HOURS
Refills: 0 | Status: DISCONTINUED | OUTPATIENT
Start: 2024-11-16 | End: 2024-11-20

## 2024-11-16 RX ADMIN — INSULIN GLARGINE 26 UNIT(S): 100 INJECTION, SOLUTION SUBCUTANEOUS at 01:25

## 2024-11-16 RX ADMIN — INSULIN GLARGINE 26 UNIT(S): 100 INJECTION, SOLUTION SUBCUTANEOUS at 22:31

## 2024-11-16 RX ADMIN — LIDOCAINE 1 PATCH: 40 CREAM TOPICAL at 11:32

## 2024-11-16 RX ADMIN — Medication 16 UNIT(S): at 13:12

## 2024-11-16 RX ADMIN — ACETAMINOPHEN 500MG 650 MILLIGRAM(S): 500 TABLET, COATED ORAL at 06:42

## 2024-11-16 RX ADMIN — LIDOCAINE 1 PATCH: 40 CREAM TOPICAL at 01:26

## 2024-11-16 RX ADMIN — Medication 16 UNIT(S): at 17:57

## 2024-11-16 RX ADMIN — Medication 16 UNIT(S): at 09:20

## 2024-11-16 RX ADMIN — LIDOCAINE 1 PATCH: 40 CREAM TOPICAL at 22:46

## 2024-11-16 RX ADMIN — LIDOCAINE 1 PATCH: 40 CREAM TOPICAL at 11:31

## 2024-11-16 RX ADMIN — ACETAMINOPHEN 500MG 650 MILLIGRAM(S): 500 TABLET, COATED ORAL at 12:27

## 2024-11-16 RX ADMIN — ACETAMINOPHEN 500MG 650 MILLIGRAM(S): 500 TABLET, COATED ORAL at 13:27

## 2024-11-16 RX ADMIN — Medication 40 MILLIGRAM(S): at 22:31

## 2024-11-16 RX ADMIN — Medication 5000 UNIT(S): at 22:31

## 2024-11-16 RX ADMIN — LIDOCAINE 1 PATCH: 40 CREAM TOPICAL at 20:14

## 2024-11-16 RX ADMIN — AMLODIPINE BESYLATE 5 MILLIGRAM(S): 10 TABLET ORAL at 16:10

## 2024-11-16 RX ADMIN — Medication 5000 UNIT(S): at 13:14

## 2024-11-16 RX ADMIN — ACETAMINOPHEN 500MG 650 MILLIGRAM(S): 500 TABLET, COATED ORAL at 07:50

## 2024-11-16 RX ADMIN — Medication 5000 UNIT(S): at 06:43

## 2024-11-16 RX ADMIN — Medication 2: at 22:30

## 2024-11-16 NOTE — PROGRESS NOTE ADULT - SUBJECTIVE AND OBJECTIVE BOX
INTERVAL HPI/OVERNIGHT EVENTS:  Awake;  Discussed SULMA with patient and I believe she understands and agrees to this plan   Glucoses stable   Wants to get of bed       MEDICATIONS  (STANDING):  amLODIPine   Tablet 5 milliGRAM(s) Oral every 24 hours  atorvastatin 40 milliGRAM(s) Oral at bedtime  dextrose 5%. 1000 milliLiter(s) (100 mL/Hr) IV Continuous <Continuous>  dextrose 5%. 1000 milliLiter(s) (50 mL/Hr) IV Continuous <Continuous>  dextrose 50% Injectable 25 Gram(s) IV Push once  dextrose 50% Injectable 12.5 Gram(s) IV Push once  dextrose 50% Injectable 25 Gram(s) IV Push once  glucagon  Injectable 1 milliGRAM(s) IntraMuscular once  heparin   Injectable 5000 Unit(s) SubCutaneous every 8 hours  insulin glargine Injectable (LANTUS) 26 Unit(s) SubCutaneous at bedtime  insulin lispro (ADMELOG) corrective regimen sliding scale   SubCutaneous Before meals and at bedtime  insulin lispro Injectable (ADMELOG) 16 Unit(s) SubCutaneous three times a day before meals  lidocaine   4% Patch 1 Patch Transdermal every 24 hours  lidocaine   4% Patch 1 Patch Transdermal every 24 hours    MEDICATIONS  (PRN):  acetaminophen     Tablet .. 650 milliGRAM(s) Oral every 6 hours PRN Temp greater or equal to 38C (100.4F), Mild Pain (1 - 3)  dextrose Oral Gel 15 Gram(s) Oral once PRN Blood Glucose LESS THAN 70 milliGRAM(s)/deciliter      Allergies    citrus (Urticaria)  Bactrim (Rash)    Intolerances        Vital Signs Last 24 Hrs  T(C): 36.5 (16 Nov 2024 12:26), Max: 36.8 (15 Nov 2024 17:00)  T(F): 97.7 (16 Nov 2024 12:26), Max: 98.2 (15 Nov 2024 17:00)  HR: 60 (16 Nov 2024 12:26) (60 - 76)  BP: 162/78 (16 Nov 2024 12:26) (143/73 - 168/89)  BP(mean): --  RR: 20 (16 Nov 2024 12:26) (18 - 20)  SpO2: 97% (16 Nov 2024 12:26) (95% - 99%)    Parameters below as of 16 Nov 2024 12:26  Patient On (Oxygen Delivery Method): room air              Constitutional:   AAwake     Eyes: NEDRA    ENMT: Negative    Neck: Supple    Back:  no tenderness     Respiratory:  clear     Cardiovascular: S1 S2    Gastrointestinal:  soft     Genitourinary:    Extremities:  no edema     Vascular:    Neurological:    Skin:    Lymph Nodes:            LABS:                        11.8   7.24  )-----------( 195      ( 15 Nov 2024 07:54 )             38.2     11-15    144  |  106  |  36[H]  ----------------------------<  88  4.6   |  27  |  1.69[H]    Ca    9.4      15 Nov 2024 07:54  Phos  3.8     11-15  Mg     2.1     11-15        Urinalysis Basic - ( 15 Nov 2024 07:54 )    Color: x / Appearance: x / SG: x / pH: x  Gluc: 88 mg/dL / Ketone: x  / Bili: x / Urobili: x   Blood: x / Protein: x / Nitrite: x   Leuk Esterase: x / RBC: x / WBC x   Sq Epi: x / Non Sq Epi: x / Bacteria: x        RADIOLOGY & ADDITIONAL TESTS:

## 2024-11-16 NOTE — PROGRESS NOTE ADULT - PROBLEM SELECTOR PLAN 4
Home regiment: lantus 23U when not eating, admelog 14U for regular meal.  Pt now with borderline low blood glucose    - Decrease lantus 26u units  - Decrease lispro 16u premeal  - miSS  - Monitor FS

## 2024-11-16 NOTE — PROGRESS NOTE ADULT - PROBLEM SELECTOR PROBLEM 11
[id number] Melanie Moore ----I thought we did it but maybe it never go printed so just print again and I will sign for her to  tomorrow tuesday Elevated d-dimer

## 2024-11-16 NOTE — PROGRESS NOTE ADULT - PROBLEM SELECTOR PLAN 7
Follows Dr. Delaney at [facility]. receiving chemo and radiation therapy.    - per heme/onc, pt has not had chemotherapy for several weeks due to intermittent forgetfulness  --> pt should restart her treatment, but at this time placement or at least placement home with proper services takes precedence  - f/u heme/onc recs

## 2024-11-16 NOTE — PROGRESS NOTE ADULT - SUBJECTIVE AND OBJECTIVE BOX
OVERNIGHT EVENTS:    SUBJECTIVE / INTERVAL HPI: Patient seen and examined at bedside.       PHYSICAL EXAM:    General: NAD  HEENT: NC/AT; PERRL, anicteric sclera; MMM  Neck: supple  Cardiovascular: +S1/S2, RRR  Respiratory: CTA B/L; no W/R/R  Gastrointestinal: soft, NT/ND; +BSx4  Extremities: WWP; no edema, clubbing or cyanosis  Vascular: 2+ radial, DP/PT pulses B/L  Neurological: AAOx3; no focal deficits  Psychiatric: pleasant mood and affect  Dermatologic: no appreciable wounds or damage to the skin    VITAL SIGNS:  Vital Signs Last 24 Hrs  T(C): 36.7 (16 Nov 2024 05:00), Max: 36.9 (15 Nov 2024 10:35)  T(F): 98 (16 Nov 2024 05:00), Max: 98.5 (15 Nov 2024 10:35)  HR: 72 (16 Nov 2024 05:00) (70 - 76)  BP: 168/89 (16 Nov 2024 05:00) (143/73 - 168/89)  BP(mean): --  RR: 18 (16 Nov 2024 05:00) (18 - 20)  SpO2: 99% (16 Nov 2024 05:00) (95% - 99%)    Parameters below as of 16 Nov 2024 05:00  Patient On (Oxygen Delivery Method): room air          MEDICATIONS:  MEDICATIONS  (STANDING):  amLODIPine   Tablet 5 milliGRAM(s) Oral every 24 hours  atorvastatin 40 milliGRAM(s) Oral at bedtime  dextrose 5%. 1000 milliLiter(s) (100 mL/Hr) IV Continuous <Continuous>  dextrose 5%. 1000 milliLiter(s) (50 mL/Hr) IV Continuous <Continuous>  dextrose 50% Injectable 25 Gram(s) IV Push once  dextrose 50% Injectable 12.5 Gram(s) IV Push once  dextrose 50% Injectable 25 Gram(s) IV Push once  glucagon  Injectable 1 milliGRAM(s) IntraMuscular once  heparin   Injectable 5000 Unit(s) SubCutaneous every 8 hours  insulin glargine Injectable (LANTUS) 26 Unit(s) SubCutaneous at bedtime  insulin lispro (ADMELOG) corrective regimen sliding scale   SubCutaneous Before meals and at bedtime  insulin lispro Injectable (ADMELOG) 16 Unit(s) SubCutaneous three times a day before meals  lidocaine   4% Patch 1 Patch Transdermal every 24 hours  lidocaine   4% Patch 1 Patch Transdermal every 24 hours    MEDICATIONS  (PRN):  dextrose Oral Gel 15 Gram(s) Oral once PRN Blood Glucose LESS THAN 70 milliGRAM(s)/deciliter      ALLERGIES:  Allergies    citrus (Urticaria)  Bactrim (Rash)    Intolerances        LABS:                        11.8   7.24  )-----------( 195      ( 15 Nov 2024 07:54 )             38.2     11-15    144  |  106  |  36[H]  ----------------------------<  88  4.6   |  27  |  1.69[H]    Ca    9.4      15 Nov 2024 07:54  Phos  3.8     11-15  Mg     2.1     11-15        Urinalysis Basic - ( 15 Nov 2024 07:54 )    Color: x / Appearance: x / SG: x / pH: x  Gluc: 88 mg/dL / Ketone: x  / Bili: x / Urobili: x   Blood: x / Protein: x / Nitrite: x   Leuk Esterase: x / RBC: x / WBC x   Sq Epi: x / Non Sq Epi: x / Bacteria: x      CAPILLARY BLOOD GLUCOSE      POCT Blood Glucose.: 98 mg/dL (15 Nov 2024 21:31)      RADIOLOGY & ADDITIONAL TESTS: Reviewed. OVERNIGHT EVENTS: Tylenol given for HA.    SUBJECTIVE / INTERVAL HPI: Patient seen and examined at bedside. Pt resting comfortably. Endorses continued HA, located on forehead. Denies fevers, chills, HA, chest pain, SOB, n/v/d, abd pain, dysuria. ROS otherwise negative.      PHYSICAL EXAM:    General: NAD  HEENT: NC/AT; PERRL, anicteric sclera; MMM  Neck: supple  Cardiovascular: +S1/S2, RRR  Respiratory: CTA B/L; no W/R/R  Gastrointestinal: soft, NT/ND; +BSx4  Extremities: WWP; no edema, clubbing or cyanosis  Vascular: 2+ radial, DP/PT pulses B/L  Neurological: AAOx3; no focal deficits  Psychiatric: pleasant mood and affect  Dermatologic: no appreciable wounds or damage to the skin    VITAL SIGNS:  Vital Signs Last 24 Hrs  T(C): 36.7 (16 Nov 2024 05:00), Max: 36.9 (15 Nov 2024 10:35)  T(F): 98 (16 Nov 2024 05:00), Max: 98.5 (15 Nov 2024 10:35)  HR: 72 (16 Nov 2024 05:00) (70 - 76)  BP: 168/89 (16 Nov 2024 05:00) (143/73 - 168/89)  BP(mean): --  RR: 18 (16 Nov 2024 05:00) (18 - 20)  SpO2: 99% (16 Nov 2024 05:00) (95% - 99%)    Parameters below as of 16 Nov 2024 05:00  Patient On (Oxygen Delivery Method): room air          MEDICATIONS:  MEDICATIONS  (STANDING):  amLODIPine   Tablet 5 milliGRAM(s) Oral every 24 hours  atorvastatin 40 milliGRAM(s) Oral at bedtime  dextrose 5%. 1000 milliLiter(s) (100 mL/Hr) IV Continuous <Continuous>  dextrose 5%. 1000 milliLiter(s) (50 mL/Hr) IV Continuous <Continuous>  dextrose 50% Injectable 25 Gram(s) IV Push once  dextrose 50% Injectable 12.5 Gram(s) IV Push once  dextrose 50% Injectable 25 Gram(s) IV Push once  glucagon  Injectable 1 milliGRAM(s) IntraMuscular once  heparin   Injectable 5000 Unit(s) SubCutaneous every 8 hours  insulin glargine Injectable (LANTUS) 26 Unit(s) SubCutaneous at bedtime  insulin lispro (ADMELOG) corrective regimen sliding scale   SubCutaneous Before meals and at bedtime  insulin lispro Injectable (ADMELOG) 16 Unit(s) SubCutaneous three times a day before meals  lidocaine   4% Patch 1 Patch Transdermal every 24 hours  lidocaine   4% Patch 1 Patch Transdermal every 24 hours    MEDICATIONS  (PRN):  dextrose Oral Gel 15 Gram(s) Oral once PRN Blood Glucose LESS THAN 70 milliGRAM(s)/deciliter      ALLERGIES:  Allergies    citrus (Urticaria)  Bactrim (Rash)    Intolerances        LABS:                        11.8   7.24  )-----------( 195      ( 15 Nov 2024 07:54 )             38.2     11-15    144  |  106  |  36[H]  ----------------------------<  88  4.6   |  27  |  1.69[H]    Ca    9.4      15 Nov 2024 07:54  Phos  3.8     11-15  Mg     2.1     11-15        Urinalysis Basic - ( 15 Nov 2024 07:54 )    Color: x / Appearance: x / SG: x / pH: x  Gluc: 88 mg/dL / Ketone: x  / Bili: x / Urobili: x   Blood: x / Protein: x / Nitrite: x   Leuk Esterase: x / RBC: x / WBC x   Sq Epi: x / Non Sq Epi: x / Bacteria: x      CAPILLARY BLOOD GLUCOSE      POCT Blood Glucose.: 98 mg/dL (15 Nov 2024 21:31)      RADIOLOGY & ADDITIONAL TESTS: Reviewed.

## 2024-11-16 NOTE — PROGRESS NOTE ADULT - PROBLEM SELECTOR PLAN 1
Possibly iso hypotension due to taking BP meds vs. orthostatic hypotension vs. UTI vs. uncontrolled BS  CTH with no acute intracranial abnormality of significant changes since 10/03/2024. Noted to have similar AMS on prior admissions.  UA grossly positive with bacteria, leukocyte esterase, WBCs  Currently AOx3, pt more alert today, though pt has had waxing/waning mental status  Orthostatics positive 11/12, s/p 1L NS and additional 500cc NS  BPs more under control today    - S/p CTX 2g q24hr for UTI x 3d  - PT/OT rec SULMA  - Will discuss with pt's family her baseline mental status, as well as acquiring more support for her at home  - C/w amlodipine 5mg qd

## 2024-11-16 NOTE — PROGRESS NOTE ADULT - PROBLEM SELECTOR PLAN 8
Last admission, nifedipine ER 60mg. But claims that she takes a medication that starts with "X"    - CTM BPs  - C/w amlodipine 5mg qd

## 2024-11-16 NOTE — PROGRESS NOTE ADULT - PROBLEM SELECTOR PLAN 2
UA grossly positive with bacteria, leukocyte esterase, WBCs. Pt without leukocytosis or fever, denies any urinary sxs, but is a poor historian.    - S/p CTX 2g q24hr x 3d

## 2024-11-17 LAB
ANION GAP SERPL CALC-SCNC: 15 MMOL/L — SIGNIFICANT CHANGE UP (ref 5–17)
APPEARANCE UR: CLEAR — SIGNIFICANT CHANGE UP
BASOPHILS # BLD AUTO: 0.06 K/UL — SIGNIFICANT CHANGE UP (ref 0–0.2)
BASOPHILS NFR BLD AUTO: 0.7 % — SIGNIFICANT CHANGE UP (ref 0–2)
BILIRUB UR-MCNC: NEGATIVE — SIGNIFICANT CHANGE UP
BUN SERPL-MCNC: 43 MG/DL — HIGH (ref 7–23)
CALCIUM SERPL-MCNC: 9.1 MG/DL — SIGNIFICANT CHANGE UP (ref 8.4–10.5)
CHLORIDE SERPL-SCNC: 104 MMOL/L — SIGNIFICANT CHANGE UP (ref 96–108)
CO2 SERPL-SCNC: 22 MMOL/L — SIGNIFICANT CHANGE UP (ref 22–31)
COLOR SPEC: YELLOW — SIGNIFICANT CHANGE UP
CREAT ?TM UR-MCNC: 91 MG/DL — SIGNIFICANT CHANGE UP
CREAT SERPL-MCNC: 2.13 MG/DL — HIGH (ref 0.5–1.3)
DIFF PNL FLD: NEGATIVE — SIGNIFICANT CHANGE UP
EGFR: 24 ML/MIN/1.73M2 — LOW
EOSINOPHIL # BLD AUTO: 0.13 K/UL — SIGNIFICANT CHANGE UP (ref 0–0.5)
EOSINOPHIL NFR BLD AUTO: 1.5 % — SIGNIFICANT CHANGE UP (ref 0–6)
GLUCOSE SERPL-MCNC: 119 MG/DL — HIGH (ref 70–99)
GLUCOSE UR QL: NEGATIVE MG/DL — SIGNIFICANT CHANGE UP
HCT VFR BLD CALC: 36.1 % — SIGNIFICANT CHANGE UP (ref 34.5–45)
HGB BLD-MCNC: 11.2 G/DL — LOW (ref 11.5–15.5)
IMM GRANULOCYTES NFR BLD AUTO: 0.6 % — SIGNIFICANT CHANGE UP (ref 0–0.9)
KETONES UR-MCNC: NEGATIVE MG/DL — SIGNIFICANT CHANGE UP
LEUKOCYTE ESTERASE UR-ACNC: NEGATIVE — SIGNIFICANT CHANGE UP
LYMPHOCYTES # BLD AUTO: 1.68 K/UL — SIGNIFICANT CHANGE UP (ref 1–3.3)
LYMPHOCYTES # BLD AUTO: 19.5 % — SIGNIFICANT CHANGE UP (ref 13–44)
MAGNESIUM SERPL-MCNC: 1.8 MG/DL — SIGNIFICANT CHANGE UP (ref 1.6–2.6)
MCHC RBC-ENTMCNC: 29.1 PG — SIGNIFICANT CHANGE UP (ref 27–34)
MCHC RBC-ENTMCNC: 31 G/DL — LOW (ref 32–36)
MCV RBC AUTO: 93.8 FL — SIGNIFICANT CHANGE UP (ref 80–100)
MONOCYTES # BLD AUTO: 0.97 K/UL — HIGH (ref 0–0.9)
MONOCYTES NFR BLD AUTO: 11.3 % — SIGNIFICANT CHANGE UP (ref 2–14)
NEUTROPHILS # BLD AUTO: 5.71 K/UL — SIGNIFICANT CHANGE UP (ref 1.8–7.4)
NEUTROPHILS NFR BLD AUTO: 66.4 % — SIGNIFICANT CHANGE UP (ref 43–77)
NITRITE UR-MCNC: NEGATIVE — SIGNIFICANT CHANGE UP
NRBC # BLD: 0 /100 WBCS — SIGNIFICANT CHANGE UP (ref 0–0)
OSMOLALITY UR: 556 MOSM/KG — SIGNIFICANT CHANGE UP (ref 300–900)
PH UR: 5.5 — SIGNIFICANT CHANGE UP (ref 5–8)
PHOSPHATE SERPL-MCNC: 3.8 MG/DL — SIGNIFICANT CHANGE UP (ref 2.5–4.5)
PLATELET # BLD AUTO: 192 K/UL — SIGNIFICANT CHANGE UP (ref 150–400)
POTASSIUM SERPL-MCNC: 3.9 MMOL/L — SIGNIFICANT CHANGE UP (ref 3.5–5.3)
POTASSIUM SERPL-SCNC: 3.9 MMOL/L — SIGNIFICANT CHANGE UP (ref 3.5–5.3)
POTASSIUM UR-SCNC: 46 MMOL/L — SIGNIFICANT CHANGE UP
PROT ?TM UR-MCNC: 204 MG/DL — HIGH (ref 0–12)
PROT UR-MCNC: 300 MG/DL
PROT/CREAT UR-RTO: 2.2 RATIO — HIGH (ref 0–0.2)
RBC # BLD: 3.85 M/UL — SIGNIFICANT CHANGE UP (ref 3.8–5.2)
RBC # FLD: 15.3 % — HIGH (ref 10.3–14.5)
SODIUM SERPL-SCNC: 141 MMOL/L — SIGNIFICANT CHANGE UP (ref 135–145)
SODIUM UR-SCNC: 76 MMOL/L — SIGNIFICANT CHANGE UP
SP GR SPEC: 1.02 — SIGNIFICANT CHANGE UP (ref 1–1.03)
UROBILINOGEN FLD QL: 0.2 MG/DL — SIGNIFICANT CHANGE UP (ref 0.2–1)
UUN UR-MCNC: 816 MG/DL — SIGNIFICANT CHANGE UP
WBC # BLD: 8.6 K/UL — SIGNIFICANT CHANGE UP (ref 3.8–10.5)
WBC # FLD AUTO: 8.6 K/UL — SIGNIFICANT CHANGE UP (ref 3.8–10.5)

## 2024-11-17 RX ADMIN — LIDOCAINE 1 PATCH: 40 CREAM TOPICAL at 22:50

## 2024-11-17 RX ADMIN — Medication 16 UNIT(S): at 18:16

## 2024-11-17 RX ADMIN — Medication 16 UNIT(S): at 09:06

## 2024-11-17 RX ADMIN — AMLODIPINE BESYLATE 5 MILLIGRAM(S): 10 TABLET ORAL at 16:57

## 2024-11-17 RX ADMIN — INSULIN GLARGINE 26 UNIT(S): 100 INJECTION, SOLUTION SUBCUTANEOUS at 22:44

## 2024-11-17 RX ADMIN — LIDOCAINE 1 PATCH: 40 CREAM TOPICAL at 11:16

## 2024-11-17 RX ADMIN — Medication 5000 UNIT(S): at 16:57

## 2024-11-17 RX ADMIN — Medication 16 UNIT(S): at 13:08

## 2024-11-17 RX ADMIN — LIDOCAINE 1 PATCH: 40 CREAM TOPICAL at 18:17

## 2024-11-17 RX ADMIN — Medication 2: at 13:08

## 2024-11-17 RX ADMIN — Medication 5000 UNIT(S): at 06:56

## 2024-11-17 RX ADMIN — Medication 40 MILLIGRAM(S): at 22:44

## 2024-11-17 RX ADMIN — ACETAMINOPHEN 500MG 650 MILLIGRAM(S): 500 TABLET, COATED ORAL at 11:15

## 2024-11-17 RX ADMIN — LIDOCAINE 1 PATCH: 40 CREAM TOPICAL at 11:17

## 2024-11-17 RX ADMIN — LIDOCAINE 1 PATCH: 40 CREAM TOPICAL at 18:18

## 2024-11-17 RX ADMIN — ACETAMINOPHEN 500MG 650 MILLIGRAM(S): 500 TABLET, COATED ORAL at 12:15

## 2024-11-17 RX ADMIN — Medication 5000 UNIT(S): at 22:44

## 2024-11-18 DIAGNOSIS — M79.669 PAIN IN UNSPECIFIED LOWER LEG: ICD-10-CM

## 2024-11-18 LAB
ANION GAP SERPL CALC-SCNC: 9 MMOL/L — SIGNIFICANT CHANGE UP (ref 5–17)
BASOPHILS # BLD AUTO: 0.05 K/UL — SIGNIFICANT CHANGE UP (ref 0–0.2)
BASOPHILS NFR BLD AUTO: 0.7 % — SIGNIFICANT CHANGE UP (ref 0–2)
BUN SERPL-MCNC: 33 MG/DL — HIGH (ref 7–23)
CALCIUM SERPL-MCNC: 9.1 MG/DL — SIGNIFICANT CHANGE UP (ref 8.4–10.5)
CHLORIDE SERPL-SCNC: 105 MMOL/L — SIGNIFICANT CHANGE UP (ref 96–108)
CO2 SERPL-SCNC: 27 MMOL/L — SIGNIFICANT CHANGE UP (ref 22–31)
CREAT ?TM UR-MCNC: 114 MG/DL — SIGNIFICANT CHANGE UP
CREAT SERPL-MCNC: 1.91 MG/DL — HIGH (ref 0.5–1.3)
EGFR: 27 ML/MIN/1.73M2 — LOW
EOSINOPHIL # BLD AUTO: 0.13 K/UL — SIGNIFICANT CHANGE UP (ref 0–0.5)
EOSINOPHIL NFR BLD AUTO: 1.9 % — SIGNIFICANT CHANGE UP (ref 0–6)
GLUCOSE SERPL-MCNC: 182 MG/DL — HIGH (ref 70–99)
HCT VFR BLD CALC: 36.6 % — SIGNIFICANT CHANGE UP (ref 34.5–45)
HGB BLD-MCNC: 11.6 G/DL — SIGNIFICANT CHANGE UP (ref 11.5–15.5)
IMM GRANULOCYTES NFR BLD AUTO: 0.3 % — SIGNIFICANT CHANGE UP (ref 0–0.9)
LYMPHOCYTES # BLD AUTO: 1.04 K/UL — SIGNIFICANT CHANGE UP (ref 1–3.3)
LYMPHOCYTES # BLD AUTO: 15.2 % — SIGNIFICANT CHANGE UP (ref 13–44)
MAGNESIUM SERPL-MCNC: 1.8 MG/DL — SIGNIFICANT CHANGE UP (ref 1.6–2.6)
MCHC RBC-ENTMCNC: 30.1 PG — SIGNIFICANT CHANGE UP (ref 27–34)
MCHC RBC-ENTMCNC: 31.7 G/DL — LOW (ref 32–36)
MCV RBC AUTO: 95.1 FL — SIGNIFICANT CHANGE UP (ref 80–100)
MONOCYTES # BLD AUTO: 0.53 K/UL — SIGNIFICANT CHANGE UP (ref 0–0.9)
MONOCYTES NFR BLD AUTO: 7.7 % — SIGNIFICANT CHANGE UP (ref 2–14)
NEUTROPHILS # BLD AUTO: 5.09 K/UL — SIGNIFICANT CHANGE UP (ref 1.8–7.4)
NEUTROPHILS NFR BLD AUTO: 74.2 % — SIGNIFICANT CHANGE UP (ref 43–77)
NRBC # BLD: 0 /100 WBCS — SIGNIFICANT CHANGE UP (ref 0–0)
PHOSPHATE SERPL-MCNC: 3.6 MG/DL — SIGNIFICANT CHANGE UP (ref 2.5–4.5)
PLATELET # BLD AUTO: 181 K/UL — SIGNIFICANT CHANGE UP (ref 150–400)
POTASSIUM SERPL-MCNC: 5.1 MMOL/L — SIGNIFICANT CHANGE UP (ref 3.5–5.3)
POTASSIUM SERPL-SCNC: 5.1 MMOL/L — SIGNIFICANT CHANGE UP (ref 3.5–5.3)
RBC # BLD: 3.85 M/UL — SIGNIFICANT CHANGE UP (ref 3.8–5.2)
RBC # FLD: 15.5 % — HIGH (ref 10.3–14.5)
SODIUM SERPL-SCNC: 141 MMOL/L — SIGNIFICANT CHANGE UP (ref 135–145)
SODIUM UR-SCNC: 47 MMOL/L — SIGNIFICANT CHANGE UP
UUN UR-MCNC: 800 MG/DL — SIGNIFICANT CHANGE UP
WBC # BLD: 6.86 K/UL — SIGNIFICANT CHANGE UP (ref 3.8–10.5)
WBC # FLD AUTO: 6.86 K/UL — SIGNIFICANT CHANGE UP (ref 3.8–10.5)

## 2024-11-18 PROCEDURE — 93970 EXTREMITY STUDY: CPT | Mod: 26

## 2024-11-18 PROCEDURE — 99232 SBSQ HOSP IP/OBS MODERATE 35: CPT | Mod: GC

## 2024-11-18 RX ORDER — SODIUM CHLORIDE 9 MG/ML
500 INJECTION, SOLUTION INTRAMUSCULAR; INTRAVENOUS; SUBCUTANEOUS ONCE
Refills: 0 | Status: COMPLETED | OUTPATIENT
Start: 2024-11-18 | End: 2024-11-18

## 2024-11-18 RX ADMIN — ACETAMINOPHEN 500MG 650 MILLIGRAM(S): 500 TABLET, COATED ORAL at 12:31

## 2024-11-18 RX ADMIN — SODIUM CHLORIDE 500 MILLILITER(S): 9 INJECTION, SOLUTION INTRAMUSCULAR; INTRAVENOUS; SUBCUTANEOUS at 17:29

## 2024-11-18 RX ADMIN — AMLODIPINE BESYLATE 5 MILLIGRAM(S): 10 TABLET ORAL at 13:09

## 2024-11-18 RX ADMIN — LIDOCAINE 1 PATCH: 40 CREAM TOPICAL at 18:38

## 2024-11-18 RX ADMIN — Medication 14 UNIT(S): at 13:14

## 2024-11-18 RX ADMIN — Medication 5000 UNIT(S): at 13:08

## 2024-11-18 RX ADMIN — Medication 14 UNIT(S): at 18:01

## 2024-11-18 RX ADMIN — LIDOCAINE 1 PATCH: 40 CREAM TOPICAL at 13:08

## 2024-11-18 RX ADMIN — Medication 5000 UNIT(S): at 22:16

## 2024-11-18 RX ADMIN — Medication 16 UNIT(S): at 08:59

## 2024-11-18 RX ADMIN — ACETAMINOPHEN 500MG 650 MILLIGRAM(S): 500 TABLET, COATED ORAL at 11:31

## 2024-11-18 RX ADMIN — Medication 5000 UNIT(S): at 06:29

## 2024-11-18 RX ADMIN — Medication 40 MILLIGRAM(S): at 22:16

## 2024-11-18 RX ADMIN — LIDOCAINE 1 PATCH: 40 CREAM TOPICAL at 18:39

## 2024-11-18 RX ADMIN — INSULIN GLARGINE 26 UNIT(S): 100 INJECTION, SOLUTION SUBCUTANEOUS at 22:16

## 2024-11-18 NOTE — PROGRESS NOTE ADULT - PROBLEM SELECTOR PLAN 1
Possibly iso hypotension due to taking BP meds vs. orthostatic hypotension vs. UTI vs. uncontrolled BS  CTH with no acute intracranial abnormality of significant changes since 10/03/2024. Noted to have similar AMS on prior admissions.  UA grossly positive with bacteria, leukocyte esterase, WBCs  Currently AOx3, pt more alert today, though pt has had waxing/waning mental status  Orthostatics positive 11/12, s/p 1L NS and additional 500cc NS  BPs more under control today    - S/p CTX 2g q24hr for UTI x 3d  - PT/OT rec SULMA  - Will discuss with pt's family her baseline mental status, as well as acquiring more support for her at home  - C/w amlodipine 5mg qd Possibly iso hypotension due to taking BP meds vs. orthostatic hypotension vs. UTI vs. uncontrolled BS  CTH with no acute intracranial abnormality of significant changes since 10/03/2024. Noted to have similar AMS on prior admissions.  UA grossly positive with bacteria, leukocyte esterase, WBCs  Orthostatics positive 11/12, s/p 1L NS and additional 500cc NS  Currently AOx3, pt more alert  BPs more under control     - S/p CTX 2g q24hr for UTI x 3d  - PT/OT rec SULMA  - Will discuss with pt's family her baseline mental status, as well as acquiring more support for her at home  - C/w amlodipine 5mg qd

## 2024-11-18 NOTE — PROGRESS NOTE ADULT - PROBLEM SELECTOR PLAN 12
Fluids: none  Electrolytes: Mg >2, K >4  Nutrition: consistent carb  Prophylaxis: lovenox  Activity: fall risk  GI: none  C: FC  Dispo: [facility]

## 2024-11-18 NOTE — PROGRESS NOTE ADULT - PROBLEM SELECTOR PLAN 11
never used w/ age corrected, wnl. Also possible elevated iso RA Fluids: none  Electrolytes: Mg >2, K >4  Nutrition: consistent carb  Prophylaxis: lovenox  Activity: fall risk  GI: none  C: FC  Dispo: NESTOR

## 2024-11-18 NOTE — PROGRESS NOTE ADULT - TIME BILLING
Patient seen and examined  Will go to [facility]  No change in current meds;  Close observation of insulin and blood sugars

## 2024-11-18 NOTE — PROGRESS NOTE ADULT - PROBLEM SELECTOR PLAN 2
UA grossly positive with bacteria, leukocyte esterase, WBCs. Pt without leukocytosis or fever, denies any urinary sxs, but is a poor historian.    - S/p CTX 2g q24hr x 3d Baseline Cr 1.56. On admission 1.90. Most recently 2.13.  FeNa 0.4%, prerenal    - Repeat urine lytes  - Continue to trend Cr  - Avoid nephrotoxic agents  - Adjust medication dosages for level of renal function

## 2024-11-18 NOTE — PROGRESS NOTE ADULT - PROBLEM SELECTOR PLAN 6
Baseline Cr 1.56. On admission 1.90,   FeNa 0.4%, prerenal    - Continue to trend Cr  - Avoid nephrotoxic agents  - Adjust medication dosages for level of renal function UA grossly positive with bacteria, leukocyte esterase, WBCs. Pt without leukocytosis or fever, denies any urinary sxs, but is a poor historian. RESOLVED.    - S/p CTX 2g q24hr x 3d

## 2024-11-18 NOTE — PROGRESS NOTE ADULT - PROBLEM SELECTOR PLAN 5
Poorly controlled T2DM, most recent A1C 7/31/24 12.4. 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 hypovolemia  Home regiment: lantus 23U when not eating, admelog 14U for regular meal.  A1c 10.5%    - as above Pt with new bilateral calf tenderness, R > L. Pt has not been ambulating as much as she would like.  Currently on heparin subq q8hr for DVT ppx.    - F/u bilateral LE dopplers

## 2024-11-18 NOTE — PROGRESS NOTE ADULT - SUBJECTIVE AND OBJECTIVE BOX
INTERVAL HPI/OVERNIGHT EVENTS:  Awake and confused   Glucoses noted   Will go to [facility]  Chief complaint headache       MEDICATIONS  (STANDING):  amLODIPine   Tablet 5 milliGRAM(s) Oral every 24 hours  atorvastatin 40 milliGRAM(s) Oral at bedtime  dextrose 5%. 1000 milliLiter(s) (100 mL/Hr) IV Continuous <Continuous>  dextrose 5%. 1000 milliLiter(s) (50 mL/Hr) IV Continuous <Continuous>  dextrose 50% Injectable 25 Gram(s) IV Push once  dextrose 50% Injectable 12.5 Gram(s) IV Push once  dextrose 50% Injectable 25 Gram(s) IV Push once  glucagon  Injectable 1 milliGRAM(s) IntraMuscular once  heparin   Injectable 5000 Unit(s) SubCutaneous every 8 hours  insulin glargine Injectable (LANTUS) 26 Unit(s) SubCutaneous at bedtime  insulin lispro (ADMELOG) corrective regimen sliding scale   SubCutaneous Before meals and at bedtime  insulin lispro Injectable (ADMELOG) 16 Unit(s) SubCutaneous three times a day before meals  lidocaine   4% Patch 1 Patch Transdermal every 24 hours  lidocaine   4% Patch 1 Patch Transdermal every 24 hours    MEDICATIONS  (PRN):  acetaminophen     Tablet .. 650 milliGRAM(s) Oral every 6 hours PRN Temp greater or equal to 38C (100.4F), Mild Pain (1 - 3)  dextrose Oral Gel 15 Gram(s) Oral once PRN Blood Glucose LESS THAN 70 milliGRAM(s)/deciliter      Allergies    citrus (Urticaria)  Bactrim (Rash)    Intolerances        Vital Signs Last 24 Hrs  T(C): 36.8 (2024 06:31), Max: 36.9 (2024 20:23)  T(F): 98.2 (2024 06:31), Max: 98.4 (2024 20:23)  HR: 71 (2024 06:31) (71 - 89)  BP: 153/81 (2024 06:31) (120/70 - 164/77)  BP(mean): --  RR: 18 (2024 06:31) (17 - 18)  SpO2: 97% (2024 06:31) (95% - 97%)    Parameters below as of 2024 06:31  Patient On (Oxygen Delivery Method): room air              Constitutional:  Awake       Eyes: NEDRA    ENMT: Negative    Neck: Supple    Back:  no tenderness     Respiratory:  clear    Cardiovascular: S1 S2    Gastrointestinal:  soft     Genitourinary:    Extremities:  no edema     Vascular:    Neurological:    Skin:    Lymph Nodes:            LABS:                        11.2   8.60  )-----------( 192      ( 2024 08:40 )             36.1         141  |  104  |  43[H]  ----------------------------<  119[H]  3.9   |  22  |  2.13[H]    Ca    9.1      2024 08:40  Phos  3.8       Mg     1.8             Urinalysis Basic - ( 2024 09:44 )    Color: Yellow / Appearance: Clear / S.019 / pH: x  Gluc: x / Ketone: Negative mg/dL  / Bili: Negative / Urobili: 0.2 mg/dL   Blood: x / Protein: 300 mg/dL / Nitrite: Negative   Leuk Esterase: Negative / RBC: 5 /HPF / WBC 3 /HPF   Sq Epi: x / Non Sq Epi: 9 /HPF / Bacteria: Negative /HPF        RADIOLOGY & ADDITIONAL TESTS:

## 2024-11-18 NOTE — PROGRESS NOTE ADULT - PROBLEM SELECTOR PLAN 7
Follows Dr. Delaney at [facility]. receiving chemo and radiation therapy.    - per heme/onc, pt has not had chemotherapy for several weeks due to intermittent forgetfulness  --> pt should restart her treatment, but at this time placement or at least placement home with proper services takes precedence  - f/u heme/onc recs

## 2024-11-18 NOTE — PROGRESS NOTE ADULT - SUBJECTIVE AND OBJECTIVE BOX
OVERNIGHT EVENTS:    SUBJECTIVE / INTERVAL HPI: Patient seen and examined at bedside.       PHYSICAL EXAM:    General: NAD  HEENT: NC/AT; PERRL, anicteric sclera; MMM  Neck: supple  Cardiovascular: +S1/S2, RRR  Respiratory: CTA B/L; no W/R/R  Gastrointestinal: soft, NT/ND; +BSx4  Extremities: WWP; no edema, clubbing or cyanosis  Vascular: 2+ radial, DP/PT pulses B/L  Neurological: AAOx3; no focal deficits  Psychiatric: pleasant mood and affect  Dermatologic: no appreciable wounds or damage to the skin    VITAL SIGNS:  Vital Signs Last 24 Hrs  T(C): 36.8 (2024 06:31), Max: 36.9 (2024 07:06)  T(F): 98.2 (2024 06:31), Max: 98.4 (2024 07:06)  HR: 71 (2024 06:31) (71 - 89)  BP: 153/81 (2024 06:31) (120/70 - 170/74)  BP(mean): --  RR: 18 (2024 06:31) (17 - 18)  SpO2: 97% (2024 06:31) (93% - 97%)    Parameters below as of 2024 06:31  Patient On (Oxygen Delivery Method): room air          MEDICATIONS:  MEDICATIONS  (STANDING):  amLODIPine   Tablet 5 milliGRAM(s) Oral every 24 hours  atorvastatin 40 milliGRAM(s) Oral at bedtime  dextrose 5%. 1000 milliLiter(s) (100 mL/Hr) IV Continuous <Continuous>  dextrose 5%. 1000 milliLiter(s) (50 mL/Hr) IV Continuous <Continuous>  dextrose 50% Injectable 25 Gram(s) IV Push once  dextrose 50% Injectable 12.5 Gram(s) IV Push once  dextrose 50% Injectable 25 Gram(s) IV Push once  glucagon  Injectable 1 milliGRAM(s) IntraMuscular once  heparin   Injectable 5000 Unit(s) SubCutaneous every 8 hours  insulin glargine Injectable (LANTUS) 26 Unit(s) SubCutaneous at bedtime  insulin lispro (ADMELOG) corrective regimen sliding scale   SubCutaneous Before meals and at bedtime  insulin lispro Injectable (ADMELOG) 16 Unit(s) SubCutaneous three times a day before meals  lidocaine   4% Patch 1 Patch Transdermal every 24 hours  lidocaine   4% Patch 1 Patch Transdermal every 24 hours    MEDICATIONS  (PRN):  acetaminophen     Tablet .. 650 milliGRAM(s) Oral every 6 hours PRN Temp greater or equal to 38C (100.4F), Mild Pain (1 - 3)  dextrose Oral Gel 15 Gram(s) Oral once PRN Blood Glucose LESS THAN 70 milliGRAM(s)/deciliter      ALLERGIES:  Allergies    citrus (Urticaria)  Bactrim (Rash)    Intolerances        LABS:                        11.2   8.60  )-----------( 192      ( 2024 08:40 )             36.1     11-17    141  |  104  |  43[H]  ----------------------------<  119[H]  3.9   |  22  |  2.13[H]    Ca    9.1      2024 08:40  Phos  3.8     11-  Mg     1.8     -17        Urinalysis Basic - ( 2024 09:44 )    Color: Yellow / Appearance: Clear / S.019 / pH: x  Gluc: x / Ketone: Negative mg/dL  / Bili: Negative / Urobili: 0.2 mg/dL   Blood: x / Protein: 300 mg/dL / Nitrite: Negative   Leuk Esterase: Negative / RBC: 5 /HPF / WBC 3 /HPF   Sq Epi: x / Non Sq Epi: 9 /HPF / Bacteria: Negative /HPF      CAPILLARY BLOOD GLUCOSE      POCT Blood Glucose.: 137 mg/dL (2024 22:33)      RADIOLOGY & ADDITIONAL TESTS: Reviewed. OVERNIGHT EVENTS: Denied AM labs.    SUBJECTIVE / INTERVAL HPI: Patient seen and examined at bedside. Pt resting comfortably. Pt denies any acute complaints at this time. Denies fevers, chills, HA, chest pain, SOB, n/v/d, abd pain, dysuria. ROS otherwise negative.      PHYSICAL EXAM:    General: NAD  HEENT: NC/AT; PERRL, anicteric sclera; MMM  Neck: supple  Cardiovascular: +S1/S2, RRR; systolic murmur present  Respiratory: CTA B/L; no W/R/R  Gastrointestinal: soft, NT/ND; +BSx4  Extremities: WWP; no edema, clubbing or cyanosis  Vascular: 2+ radial, DP/PT pulses B/L  Neurological: AAOx3; no focal deficits  Psychiatric: pleasant mood and affect  Dermatologic: no appreciable wounds or damage to the skin    VITAL SIGNS:  Vital Signs Last 24 Hrs  T(C): 36.8 (2024 06:31), Max: 36.9 (2024 07:06)  T(F): 98.2 (2024 06:31), Max: 98.4 (2024 07:06)  HR: 71 (2024 06:31) (71 - 89)  BP: 153/81 (2024 06:31) (120/70 - 170/74)  BP(mean): --  RR: 18 (2024 06:31) (17 - 18)  SpO2: 97% (2024 06:31) (93% - 97%)    Parameters below as of 2024 06:31  Patient On (Oxygen Delivery Method): room air          MEDICATIONS:  MEDICATIONS  (STANDING):  amLODIPine   Tablet 5 milliGRAM(s) Oral every 24 hours  atorvastatin 40 milliGRAM(s) Oral at bedtime  dextrose 5%. 1000 milliLiter(s) (100 mL/Hr) IV Continuous <Continuous>  dextrose 5%. 1000 milliLiter(s) (50 mL/Hr) IV Continuous <Continuous>  dextrose 50% Injectable 25 Gram(s) IV Push once  dextrose 50% Injectable 12.5 Gram(s) IV Push once  dextrose 50% Injectable 25 Gram(s) IV Push once  glucagon  Injectable 1 milliGRAM(s) IntraMuscular once  heparin   Injectable 5000 Unit(s) SubCutaneous every 8 hours  insulin glargine Injectable (LANTUS) 26 Unit(s) SubCutaneous at bedtime  insulin lispro (ADMELOG) corrective regimen sliding scale   SubCutaneous Before meals and at bedtime  insulin lispro Injectable (ADMELOG) 16 Unit(s) SubCutaneous three times a day before meals  lidocaine   4% Patch 1 Patch Transdermal every 24 hours  lidocaine   4% Patch 1 Patch Transdermal every 24 hours    MEDICATIONS  (PRN):  acetaminophen     Tablet .. 650 milliGRAM(s) Oral every 6 hours PRN Temp greater or equal to 38C (100.4F), Mild Pain (1 - 3)  dextrose Oral Gel 15 Gram(s) Oral once PRN Blood Glucose LESS THAN 70 milliGRAM(s)/deciliter      ALLERGIES:  Allergies    citrus (Urticaria)  Bactrim (Rash)    Intolerances        LABS:                        11.2   8.60  )-----------( 192      ( 2024 08:40 )             36.1     11-17    141  |  104  |  43[H]  ----------------------------<  119[H]  3.9   |  22  |  2.13[H]    Ca    9.1      2024 08:40  Phos  3.8     11-  Mg     1.8     -17        Urinalysis Basic - ( 2024 09:44 )    Color: Yellow / Appearance: Clear / S.019 / pH: x  Gluc: x / Ketone: Negative mg/dL  / Bili: Negative / Urobili: 0.2 mg/dL   Blood: x / Protein: 300 mg/dL / Nitrite: Negative   Leuk Esterase: Negative / RBC: 5 /HPF / WBC 3 /HPF   Sq Epi: x / Non Sq Epi: 9 /HPF / Bacteria: Negative /HPF      CAPILLARY BLOOD GLUCOSE      POCT Blood Glucose.: 137 mg/dL (2024 22:33)      RADIOLOGY & ADDITIONAL TESTS: Reviewed.

## 2024-11-18 NOTE — PROGRESS NOTE ADULT - PROBLEM SELECTOR PLAN 4
Home regiment: lantus 23U when not eating, admelog 14U for regular meal.  Pt now with borderline low blood glucose    - Decrease lantus 26u units  - Decrease lispro 16u premeal  - miSS  - Monitor FS Poorly controlled T2DM, most recent A1C 7/31/24 12.4. 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 hypovolemia  Home regiment: lantus 23U when not eating, admelog 14U for regular meal.  A1c 10.5%    - as above

## 2024-11-19 PROCEDURE — 99232 SBSQ HOSP IP/OBS MODERATE 35: CPT | Mod: GC

## 2024-11-19 RX ORDER — ACETAMINOPHEN 500MG 500 MG/1
2 TABLET, COATED ORAL
Qty: 0 | Refills: 0 | DISCHARGE
Start: 2024-11-19

## 2024-11-19 RX ORDER — AMLODIPINE BESYLATE 10 MG/1
10 TABLET ORAL EVERY 24 HOURS
Refills: 0 | Status: DISCONTINUED | OUTPATIENT
Start: 2024-11-19 | End: 2024-11-20

## 2024-11-19 RX ORDER — AMLODIPINE BESYLATE 10 MG/1
1 TABLET ORAL
Qty: 0 | Refills: 0 | DISCHARGE
Start: 2024-11-19

## 2024-11-19 RX ADMIN — LIDOCAINE 1 PATCH: 40 CREAM TOPICAL at 00:16

## 2024-11-19 RX ADMIN — Medication 40 MILLIGRAM(S): at 22:18

## 2024-11-19 RX ADMIN — INSULIN GLARGINE 26 UNIT(S): 100 INJECTION, SOLUTION SUBCUTANEOUS at 22:19

## 2024-11-19 RX ADMIN — LIDOCAINE 1 PATCH: 40 CREAM TOPICAL at 13:35

## 2024-11-19 RX ADMIN — Medication 5000 UNIT(S): at 13:35

## 2024-11-19 RX ADMIN — ACETAMINOPHEN 500MG 650 MILLIGRAM(S): 500 TABLET, COATED ORAL at 05:47

## 2024-11-19 RX ADMIN — Medication 14 UNIT(S): at 12:13

## 2024-11-19 RX ADMIN — LIDOCAINE 1 PATCH: 40 CREAM TOPICAL at 18:47

## 2024-11-19 RX ADMIN — Medication 5000 UNIT(S): at 22:20

## 2024-11-19 RX ADMIN — ACETAMINOPHEN 500MG 650 MILLIGRAM(S): 500 TABLET, COATED ORAL at 06:49

## 2024-11-19 RX ADMIN — Medication 8: at 22:18

## 2024-11-19 RX ADMIN — Medication 14 UNIT(S): at 09:02

## 2024-11-19 RX ADMIN — AMLODIPINE BESYLATE 10 MILLIGRAM(S): 10 TABLET ORAL at 12:12

## 2024-11-19 NOTE — PROGRESS NOTE ADULT - ASSESSMENT
{\rtf1\pukrhz46525\ansi\lkomgdx8708\ftnbj\uc1\deff0  {\fonttbl{\f0 \fnil Segoe UI;}{\f1 \fnil \fcharset0 Segoe UI;}{\f2 \fnil Times New Aydin;}}  {\colortbl ;\hun105\ujcln978\lpnc486 ;\red0\green0\blue0 ;\red0\green0\bpwt360 ;\red0\green0\blue0 ;}  {\stylesheet{\f0\fs20 Normal;}{\cs1 Default Paragraph Font;}{\cs2\f0\fs16 Line Number;}{\cs3\f2\fs24\ul\cf3 Hyperlink;}}  {\*\revtbl{Unknown;}}  \eeppie57530\axurii82246\dgsoz4825\pxvzq5080\hazpb4339\oblwf3955\qptsdjl561\dcufxgi521\nogrowautofit\jottoh116\formshade\nofeaturethrottle1\dntblnsbdb\fet4\aendnotes\aftnnrlc\pgbrdrhead\pgbrdrfoot  \sectd\cqyzwe68261\yqhxvn97533\guttersxn0\xhmqirrz5341\narsclvj6669\peficbex4575\wchigzwt3913\xytgiyf675\zhfhwkm172\sbkpage\pgncont\pgndec  \plain\plain\f0\fs24\ql\plain\f0\fs24\plain\f0\fs20\qoul8455\hich\f0\dbch\f0\loch\f0\fs20\par  I M\par  \par  73 y/o F PMHX lung cancer mets to brain, CKD, HLD, DM2, RA BIBEMS for AMS iso hypotension.\par  \par  \plain\f1\fs20\jyyt2340\hich\f1\dbch\f1\loch\f1\cf2\fs20\ul{\field{\*\fldinst HYPERLINK [id number] }{\fldrslt Problem/Plan - 1:}}\plain\f0\fs20\gdul9070\hich\f0\dbch\f0\loch\f0\fs20\ql\par  \'b7  {\*\bkmkstart ve07928381576}{\*\bkmkend wa41891889007}Problem: {\*\bkmkstart le67229704802}{\*\bkmkend kx70172369984}Altered mental status. \par  \'b7  {\*\bkmkstart wm44996071759}{\*\bkmkend cj99567080543}Plan: {\*\bkmkstart ol44113660666}{\*\bkmkend sm56754647400}Possibly iso hypotension due to taking BP meds vs. orthostatic hypotension vs. UTI vs. uncontrolled BS\par  CTH with no acute intracranial abnormality of significant changes since 10/03/2024. Noted to have similar AMS on prior admissions.\par  UA grossly positive with bacteria, leukocyte esterase, WBCs\par  Orthostatics positive 11/12, s/p 1L NS and additional 500cc NS\par  Currently AOx3, pt more alert\par  BPs more under control \par  \par  - S/p CTX 2g q24hr for UTI x 3d\par  - PT/OT rec SULMA\par  - Will discuss with pt's family her baseline mental status, as well as acquiring more support for her at home\par  - C/w amlodipine 5mg qd.\plain\f1\fs20\ihwn6745\hich\f1\dbch\f1\loch\f1\cf2\fs20\strike\plain\f0\fs20\hwdc3575\hich\f0\dbch\f0\loch\f0\fs20\par  \par  \plain\f1\fs20\hjmh3782\hich\f1\dbch\f1\loch\f1\cf2\fs20\ul{\field{\*\fldinst HYPERLINK [id number] }{\fldrslt Problem/Plan - 2:}}\plain\f0\fs20\hdrg7479\hich\f0\dbch\f0\loch\f0\fs20\ql\par  \'b7  {\*\bkmkstart vg68744852726}{\*\bkmkend vd46229343523}Problem: {\*\bkmkstart zx26675509878}{\*\bkmkend bb49044443350}Acute kidney injury superimposed on CKD.\plain\f1\fs20\ddtt3655\hich\f1\dbch\f1\loch\f1\cf2\fs20\strike\plain\f0\fs20\qmps2496\hich\f0\dbch\f0\loch\f0\fs20\par  \'b7  {\*\bkmkstart td92096536330}{\*\bkmkend we07513008201}Plan: {\*\bkmkstart fv56100886368}{\*\bkmkend wc22741709090}Baseline Cr 1.56. On admission 1.90. Most recently 2.13.\par  FeNa 0.4%, prerenal\par  \par  - Repeat urine lytes\par  - Continue to trend Cr\par  - Avoid nephrotoxic agents\par  - Adjust medication dosages for level of renal function.\plain\f1\fs20\koml3632\hich\f1\dbch\f1\loch\f1\cf2\fs20\strike\plain\f0\fs20\eyhy5101\hich\f0\dbch\f0\loch\f0\fs20\par  \par  \plain\f1\fs20\zyey9054\hich\f1\dbch\f1\loch\f1\cf2\fs20\ul{\field{\*\fldinst HYPERLINK [id number] }{\fldrslt Problem/Plan - 3:}}\plain\f0\fs20\sosz1414\hich\f0\dbch\f0\loch\f0\fs20\ql\par  \'b7  {\*\bkmkstart uo06098400274}{\*\bkmkend st06913692663}Problem: {\*\bkmkstart vi87897948286}{\*\bkmkend xs87869257218}Hyperglycemia.\plain\f1\fs20\rpga2318\hich\f1\dbch\f1\loch\f1\cf2\fs20\strike\plain\f0\fs20\knzg6254\hich\f0\dbch\f0\loch\f0\fs20\par  \'b7  {\*\bkmkstart wh78415208776}{\*\bkmkend qq70718730985}Plan: {\*\bkmkstart pb36055446664}{\*\bkmkend yc86519388040}Home regiment: lantus 23U when not eating, admelog 14U for regular meal.\par  Pt now with borderline low blood glucose\par  \par  - C/w lantus 26u units\par  - Decrease lispro 14u premeal\par  - miSS\par  - Monitor FS.\plain\f1\fs20\xoyc3069\hich\f1\dbch\f1\loch\f1\cf2\fs20\strike\plain\f0\fs20\xglk3289\hich\f0\dbch\f0\loch\f0\fs20\par  \par  \plain\f1\fs20\mbjh1894\hich\f1\dbch\f1\loch\f1\cf2\fs20\ul{\field{\*\fldinst HYPERLINK [id number] }{\fldrslt Problem/Plan - 4:}}\plain\f0\fs20\hfwo2879\hich\f0\dbch\f0\loch\f0\fs20\ql\par  \'b7  {\*\bkmkstart bq55260033603}{\*\bkmkend ir83414609156}Problem: {\*\bkmkstart kj85416998633}{\*\bkmkend am22914836865}Diabetes mellitus.\plain\f1\fs20\brfs7425\hich\f1\dbch\f1\loch\f1\cf2\fs20\strike\plain\f0\fs20\gses8247\hich\f0\dbch\f0\loch\f0\fs20\par  \'b7  {\*\bkmkstart gh25061079536}{\*\bkmkend jy04592643610}Plan: {\*\bkmkstart zj42613877623}{\*\bkmkend os51061773161}Poorly controlled T2DM, most recent A1C 7/31/24 12.4. likely d/t med noncompliance iso cognitive problems, metastatic lung cancer to   brain vs. hypovolemia\par  Home regiment: lantus 23U when not eating, admelog 14U for regular meal.\par  A1c 10.5%\par  \par  - as above.\plain\f1\fs20\hiho4675\hich\f1\dbch\f1\loch\f1\cf2\fs20\strike\plain\f0\fs20\dpux2512\hich\f0\dbch\f0\loch\f0\fs20\par  \par  \plain\f1\fs20\jpja2285\hich\f1\dbch\f1\loch\f1\cf2\fs20\ul{\field{\*\fldinst HYPERLINK [id number] }{\fldrslt Problem/Plan - 5:}}\plain\f0\fs20\vhfa0796\hich\f0\dbch\f0\loch\f0\fs20\ql\par  \'b7  {\*\bkmkstart gg64258041660}{\*\bkmkend xj43773849762}Problem: {\*\bkmkstart my82257653022}{\*\bkmkend lz61638328140}Calf tenderness.\plain\f1\fs20\kknt5457\hich\f1\dbch\f1\loch\f1\cf2\fs20\strike\plain\f0\fs20\gkwq1087\hich\f0\dbch\f0\loch\f0\fs20\par  \'b7  {\*\bkmkstart qq76834405426}{\*\bkmkend mq94071504619}Plan: {\*\bkmkstart ye51229461373}{\*\bkmkend qv51693091078}Pt with new bilateral calf tenderness, R > L. Pt has not been ambulating as much as she would like.\par  Currently on heparin subq q8hr for DVT ppx.\par  \par  - F/u bilateral LE dopplers.\plain\f1\fs20\prel6931\hich\f1\dbch\f1\loch\f1\cf2\fs20\strike\plain\f0\fs20\wtci1976\hich\f0\dbch\f0\loch\f0\fs20\par  \par  \plain\f1\fs20\edby1805\hich\f1\dbch\f1\loch\f1\cf2\fs20\ul{\field{\*\fldinst HYPERLINK [id number] }{\fldrslt Problem/Plan - 6:}}\plain\f0\fs20\yqwb6682\hich\f0\dbch\f0\loch\f0\fs20\ql\par  \'b7  {\*\bkmkstart rc28526095689}{\*\bkmkend dy38345407116}Problem: {\*\bkmkstart zm80991206928}{\*\bkmkend tv55748314754}Acute UTI.\plain\f1\fs20\mcik8698\hich\f1\dbch\f1\loch\f1\cf2\fs20\strike\plain\f0\fs20\jwei9490\hich\f0\dbch\f0\loch\f0\fs20\par  \'b7  {\*\bkmkstart cu87502184893}{\*\bkmkend zr74152628282}Plan: {\*\bkmkstart ky37759866486}{\*\bkmkend iv65892374165}UA grossly positive with bacteria, leukocyte esterase, WBCs. Pt without leukocytosis or fever, denies any urinary sxs, but is a poor   historian. RESOLVED.\par  \par  - S/p CTX 2g q24hr x 3d.\plain\f1\fs20\sjyy0564\hich\f1\dbch\f1\loch\f1\cf2\fs20\strike\plain\f0\fs20\pong5038\hich\f0\dbch\f0\loch\f0\fs20\par  \par  \plain\f1\fs20\qvlu4983\hich\f1\dbch\f1\loch\f1\cf2\fs20\ul{\field{\*\fldinst HYPERLINK [id number] }{\fldrslt Problem/Plan - 7:}}\plain\f0\fs20\codk5560\hich\f0\dbch\f0\loch\f0\fs20\ql\par  \'b7  {\*\bkmkstart ph41649687936}{\*\bkmkend dm65619838480}Problem: {\*\bkmkstart co66702654495}{\*\bkmkend bk48679523419}NSCLC metastatic to brain. \par  \'b7  {\*\bkmkstart hl94439495086}{\*\bkmkend vi42717523197}Plan: {\*\bkmkstart iv50529482859}{\*\bkmkend rl14863098136}Follows Dr. Delaney at [facility]. receiving chemo and radiation therapy.\par  \par  - per heme/onc, pt has not had chemotherapy for several weeks due to intermittent forgetfulness  --> pt should restart her treatment, but at this time placement or at least placement home with proper services takes precedence\par  - f/u heme/onc recs.\par  \par  \plain\f1\fs20\kmab7074\hich\f1\dbch\f1\loch\f1\cf2\fs20\ul{\field{\*\fldinst HYPERLINK [id number] }{\fldrslt Problem/Plan - 8:}}\plain\f0\fs20\kgri0615\hich\f0\dbch\f0\loch\f0\fs20\ql\par  \'b7  {\*\bkmkstart vx52245705825}{\*\bkmkend cs24117859553}Problem: {\*\bkmkstart eq10800124344}{\*\bkmkend yr39257762417}Hypertension. \par  \'b7  {\*\bkmkstart as52042915535}{\*\bkmkend vm38792578369}Plan: {\*\bkmkstart dn85306536203}{\*\bkmkend fh09251227878}Last admission, nifedipine ER 60mg. But claims that she takes a medication that starts with "X"\par  \par  - CTM BPs\par  - C/w amlodipine 5mg qd.\par  \par  \plain\f1\fs20\vgga8998\hich\f1\dbch\f1\loch\f1\cf2\fs20\ul{\field{\*\fldinst HYPERLINK [id number] }{\fldrslt Problem/Plan - 9:}}\plain\f0\fs20\axyi8514\hich\f0\dbch\f0\loch\f0\fs20\ql\par  \'b7  {\*\bkmkstart hb28708739650}{\*\bkmkend zu08121271268}Problem: {\*\bkmkstart nb93305007266}{\*\bkmkend jx90910315306}Hyperlipidemia. \par  \'b7  {\*\bkmkstart uc08442241289}{\*\bkmkend tl47013955283}Plan: {\*\bkmkstart [id number]}{\*\bkmkend [id number]}Home atorvastatin 40mg qD.\par  \par  - c/w home meds.\par  \par  \plain\f1\fs20\ryno4711\hich\f1\dbch\f1\loch\f1\cf2\fs20\ul{\field{\*\fldinst HYPERLINK [id number] }{\fldrslt Problem/Plan - 10:}}\plain\f0\fs20\tcdr7725\hich\f0\dbch\f0\loch\f0\fs20\ql\par  \'b7  {\*\bkmkstart [id number]}{\*\bkmkend [id number]}Problem: {\*\bkmkstart lc38386923815}{\*\bkmkend gf37495563370}Rheumatoid arthritis. \par  \'b7  {\*\bkmkstart [id number]}{\*\bkmkend [id number]}Plan; {\*\bkmkstart lj12421663997}{\*\bkmkend ky51078171546}Hx of chronic RA, doesn't take home meds for it\par  \par  - lidocaine patches for shoulders b/l\par  - f/u outpatient PCP.\par  \par  \plain\f1\fs20\aspz9527\hich\f1\dbch\f1\loch\f1\cf2\fs20\ul{\field{\*\fldinst HYPERLINK [id number] }{\fldrslt Problem/Plan - 11:}}\plain\f0\fs20\gajj1670\hich\f0\dbch\f0\loch\f0\fs20\ql\par  \'b7  {\*\bkmkstart [id number]}{\*\bkmkend [id number]}Problem: {\*\bkmkstart [id number]}{\*\bkmkend [id number]}Prophylactic measure.\plain\f1\fs20\rrgn3309\hich\f1\dbch\f1\loch\f1\cf2\fs20\strike\plain\f0\fs20\jikc2205\hich\f0\dbch\f0\loch\f0\fs20\par  \'b7  {\*\bkmkstart [id number]}{\*\bkmkend ei051917167620}Plan: {\*\bkmkstart [id number]}{\*\bkmkend vg224270725416}Fluids: none\par  Electrolytes: Mg >2, K >4\par  Nutrition: consistent carb\par  Prophylaxis: lovenox\par  Activity: fall risk\par  GI: none\par  C: FC\par  Dispo: RMF.\plain\f1\fs20\qsid0944\hich\f1\dbch\f1\loch\f1\cf2\fs20\strike\plain\f0\fs20\eome2741\hich\f0\dbch\f0\loch\f0\fs20\par  \par  }

## 2024-11-19 NOTE — PROVIDER CONTACT NOTE (HYPOGLYCEMIA EVENT) - NS PROVIDER CONTACT BACKGROUND-HYPO
Age: 74y    Gender: Female    POCT Blood Glucose:  127 mg/dL (11-19-24 @ 18:35)  68 mg/dL (11-19-24 @ 18:02)  61 mg/dL (11-19-24 @ 17:29)  145 mg/dL (11-19-24 @ 12:02)  81 mg/dL (11-19-24 @ 08:25)  126 mg/dL (11-18-24 @ 21:52)      eMAR:atorvastatin   40 milliGRAM(s) Oral (11-18-24 @ 22:16)    insulin glargine Injectable (LANTUS)   26 Unit(s) SubCutaneous (11-18-24 @ 22:16)    insulin lispro Injectable (ADMELOG)   14 Unit(s) SubCutaneous (11-19-24 @ 12:13)   14 Unit(s) SubCutaneous (11-19-24 @ 09:02)

## 2024-11-19 NOTE — PROGRESS NOTE ADULT - TIME BILLING
Patient seen and examined;  May need further reduction of insulin regimen  BP borderline;  Plans noted for SULMA

## 2024-11-19 NOTE — PROGRESS NOTE ADULT - PROBLEM SELECTOR PLAN 8
Last admission, nifedipine ER 60mg. But claims that she takes a medication that starts with "X"    - CTM BPs  - C/w amlodipine 5mg qd Last admission, nifedipine ER 60mg. But claims that she takes a medication that starts with "X"    - CTM BPs  - Increase amlodipine 5mg to 10mg qd

## 2024-11-19 NOTE — PROGRESS NOTE ADULT - SUBJECTIVE AND OBJECTIVE BOX
Physical Medicine and Rehabilitation Progress Note :       Patient is a 74y old  Female who presents with a chief complaint of AMS (19 Nov 2024 09:38)      HPI:  75 y/o F pmhx lung cancer mets to miguel, CKD, HLD, DM2, RA BIBEMS complaining of AMS. Pt states she woke up today around 1:30pm but felt confused. Per ED note, pt showed up at her infusion center on the wrong appointment day and they reported her acting confused. EMS reports patient was hypotensive on their arrival. Pt cannot recall specific reasons/details leading to her admission. Currently, she denies headache, lightheadedness, dizziness, SOB, CP, diarrhea, constipation, increase in thirst, polyuria, nausea or vomiting.    In the ED  Vitals: T 98.1, HR 73, /77 RR 17 SpO2 97%  Labs: WBC 12.13 D-dimer 449, Trops 88-->93 BUN 32 Cr 1.90  CT head: No acute intracranial abnormality or significant adverse change since 10/03/2024  EKG: NSR and ST elevation in V2 unchanged from prior EKGs  Interventions: none   (12 Nov 2024 01:41)                            11.6   6.86  )-----------( 181      ( 18 Nov 2024 14:00 )             36.6       11-18    141  |  105  |  33[H]  ----------------------------<  182[H]  5.1   |  27  |  1.91[H]    Ca    9.1      18 Nov 2024 14:00  Phos  3.6     11-18  Mg     1.8     11-18      Vital Signs Last 24 Hrs  T(C): 36.8 (19 Nov 2024 05:31), Max: 36.8 (19 Nov 2024 05:31)  T(F): 98.2 (19 Nov 2024 05:31), Max: 98.2 (19 Nov 2024 05:31)  HR: 79 (19 Nov 2024 05:31) (76 - 81)  BP: 152/67 (19 Nov 2024 05:49) (150/88 - 174/75)  BP(mean): 108 (19 Nov 2024 05:31) (108 - 108)  RR: 18 (19 Nov 2024 05:31) (18 - 18)  SpO2: 95% (19 Nov 2024 05:31) (95% - 97%)    Parameters below as of 19 Nov 2024 05:31  Patient On (Oxygen Delivery Method): room air        MEDICATIONS  (STANDING):  amLODIPine   Tablet 5 milliGRAM(s) Oral every 24 hours  atorvastatin 40 milliGRAM(s) Oral at bedtime  dextrose 5%. 1000 milliLiter(s) (100 mL/Hr) IV Continuous <Continuous>  dextrose 5%. 1000 milliLiter(s) (50 mL/Hr) IV Continuous <Continuous>  dextrose 50% Injectable 25 Gram(s) IV Push once  dextrose 50% Injectable 12.5 Gram(s) IV Push once  dextrose 50% Injectable 25 Gram(s) IV Push once  glucagon  Injectable 1 milliGRAM(s) IntraMuscular once  heparin   Injectable 5000 Unit(s) SubCutaneous every 8 hours  insulin glargine Injectable (LANTUS) 26 Unit(s) SubCutaneous at bedtime  insulin lispro (ADMELOG) corrective regimen sliding scale   SubCutaneous Before meals and at bedtime  insulin lispro Injectable (ADMELOG) 14 Unit(s) SubCutaneous three times a day before meals  lidocaine   4% Patch 1 Patch Transdermal every 24 hours  lidocaine   4% Patch 1 Patch Transdermal every 24 hours    MEDICATIONS  (PRN):  acetaminophen     Tablet .. 650 milliGRAM(s) Oral every 6 hours PRN Temp greater or equal to 38C (100.4F), Mild Pain (1 - 3)  dextrose Oral Gel 15 Gram(s) Oral once PRN Blood Glucose LESS THAN 70 milliGRAM(s)/deciliter      T(C): 36.8 (11-19-24 @ 05:31), Max: 36.8 (11-19-24 @ 05:31)  HR: 79 (11-19-24 @ 05:31) (76 - 81)  BP: 152/67 (11-19-24 @ 05:49) (150/88 - 174/75)  RR: 18 (11-19-24 @ 05:31) (18 - 18)  SpO2: 95% (11-19-24 @ 05:31) (95% - 97%)    Physical Exam:   74 y o woman lying comfortably in semi Zee's position , awake ,  NAD    Head: normocephalic , atraumatic    Eyes: PERRLA , EOMI , no nystagmus , sclera anicteric    ENT / FACE: neg nasal discharge , uvula midline , no oropharyngeal erythema / exudate    Neck: supple , negative JVD , negative carotid bruits , no thyromegaly    Chest: CTA bilaterally      Cardiovascular: regular rate and rhythm , neg murmurs / rubs / gallops    Abdomen: soft , non distended , no tenderness to palpation in all 4 quadrants ,  normal bowel sounds     Extremities: WWP , neg cyanosis /clubbing / edema       Neurologic Exam:     Alert and oriented to person , place , date/year , speech fluent w/o dysarthria , follows commands , recent and remote memory intact , repetition intact , comprehension intact ,  attention/concentration intact , fund of knowledge appropriate    Cranial Nerves:           II:                         pupils equal , round and reactive to light , visual fields intact         III/ IV/VI:             extraocular movements intact , neg nystagmus , neg ptosis        V:                        facial sensation intact , V1-3 normal        VII:                      face symmetric , no droop , normal eye closure and smile        VIII:                     hearing intact to finger rub bilaterally        IX and X:             no hoarseness , gag intact , palate/ uvula rise symmetrically        XI:                       SCM / trapezius strength intact bilateral        XII:                      no tongue deviation    Motor Exam:        > 3+/5 x 4 extremities , without drift     Sensation:         intact to light touch x 4 extremities                            no neglect or extinction on double simultaneous testing    DTR:           biceps/brachioradialis: equal                            patella/ankle: equal          neg Babinski      Coordination:            Finger to Nose:  neg dysmetria bilaterally        11/18/2024 Functional Status Assessment :       Pain Assessment/Number Scale (0-10) Adult  Presence of Pain: complains of pain/discomfort  Body Location: HA  Pain Rating (0-10): Rest: 5 (moderate pain)  Pain Rating (0-10): Activity: 10 (severe pain)  Pain Alleviating Factors: RN made aware    Safety      AM-PAC Functional Assessment: Basic Mobility  Type of Assessment: Daily assessment  Turning from your back to your side while in a flat bed without using bedrails?: 4 = No assist / stand by assistance  Moving from lying on your back to sitting on the flat side of a flat bed without using bedrails?: 3 = A little assistance  Moving to and from a bed to a chair (including a wheelchair)?: 3 = A little assistance  Standing up from a chair using your arms (e.g. wheelchair or bedside chair)?: 3 = A little assistance  Walking in hospital room?: 3 = A little assistance  Climbing 3-5 steps with a railing?: 2 = A lot of assistance  Score: 18   Row Comment: Ask the patient "How much help from another person do you currently need? (If the patient hasn't done an activity recently, how much help from another person do you think he/she needs if he/she tried?)    Cognitive/Neuro      Cognitive/Neuro/Behavioral  Cognitive/Neuro/Behavioral [WDL Definition: Alert; opens eyes spontaneously; arouses to voice or touch; oriented x 4; follows commands; speech spontaneous, logical; purposeful motor response; behavior appropriate to situation]: WDL    Language Assistance  Preferred Language to Address Healthcare Preferred Language to Address Healthcare: English    Therapeutic Interventions      Bed Mobility  Bed Mobility Training Rehab Potential: fair, will monitor progress closely  Bed Mobility Training Sit-to-Supine: minimum assist (75% patient effort);  1 person assist;  nonverbal cues (demo/gestures);  verbal cues  Bed Mobility Training Supine-to-Sit: minimum assist (75% patient effort);  1 person assist;  nonverbal cues (demo/gestures);  verbal cues  Bed Mobility Training Limitations: decreased flexibility;  decreased strength;  impaired balance;  impaired postural control    Sit-Stand Transfer Training  Sit-to-Stand Transfer Training Rehab Potential: good, to achieve stated therapy goals  Transfer Training Sit-to-Stand Transfer: contact guard;  1 person assist;  nonverbal cues (demo/gestures);  verbal cues;  full weight-bearing   rollator   Transfer Training Stand-to-Sit Transfer: contact guard;  1 person assist;  nonverbal cues (demo/gestures);  verbal cues;  full weight-bearing   rollator   Sit-to-Stand Transfer Training Transfer Safety Analysis: decreased flexibility;  decreased strength;  impaired balance;  impaired postural control;  rollator     Toilet Transfer Training  Toilet Transfer Training Rehab Potential: good, to achieve stated therapy goals  Transfer Training Toilet Transfer: minimum assist (75% patient effort);  1 person assist;  nonverbal cues (demo/gestures);  verbal cues;  full weight-bearing   grab bars;  rollator   Toilet Transfer Training Transfer Safety Analysis: decreased balance;  decreased cognition;  decreased strength;  impaired balance;  impaired postural control;  rollator     Gait Training  Gait Training Rehab Potential: good, to achieve stated therapy goals  Gait Training: contact guard;  1 person assist;  nonverbal cues (demo/gestures);  verbal cues;  full weight-bearing   rollator ;  15'x2  Gait Analysis: decreased gaby;  increased time in double stance;  shuffling;  decreased step length;  decreased flexibility;  decreased strength;  impaired balance;  impaired postural control;  15'x2;  rollator         AM-PAC Functional Assessment: Daily Activity  Type of Assessment: Daily assessment  Putting on and taking off regular lower body clothing?: 3 = A little assistance  Bathing (including washing, rinsing, drying)?: 3 = A little assistance  Toileting, which includes using toilet, bedpan or urinal?: 3 = A little assistance  Putting on and taking off regular upper body clothing?: 3 = A little assistance  Take care of personal grooming such as brushing teeth?: 3 = A little assistance  Eating meals?: 3 = A little assistance  Score: 18   Row Comment: Ask the patient "How much help from another person do you currently need? (If the patient hasn't done an activity recently, how much help from another person do you think he/she needs if he/she tried?)    Cognitive/Neuro      Cognitive/Neuro/Behavioral  Cognitive/Neuro/Behavioral [WDL Definition: Alert; opens eyes spontaneously; arouses to voice or touch; oriented x 4; follows commands; speech spontaneous, logical; purposeful motor response; behavior appropriate to situation]: WDL    Language Assistance  Preferred Language to Address Healthcare Preferred Language to Address Healthcare: English    Therapeutic Interventions      Bed Mobility  Bed Mobility Training Symptoms Noted During/After Treatment: fatigue  Bed Mobility Training Rolling/Turning: minimum assist (75% patient effort);  1 person assist;  verbal cues  Bed Mobility Training Scooting: minimum assist (75% patient effort);  1 person assist;  verbal cues  Bed Mobility Training Sit-to-Supine: minimum assist (75% patient effort)  Bed Mobility Training Supine-to-Sit: 1 person assist;  verbal cues  Bed Mobility Training Limitations: decreased flexibility;  decreased strength;  impaired balance;  impaired coordination    Sit-Stand Transfer Training  Transfer Training Sit-to-Stand Transfer: minimum assist (75% patient effort);  1 person assist;  verbal cues;  rollator  Transfer Training Stand-to-Sit Transfer: minimum assist (75% patient effort);  1 person assist;  verbal cues;  rollator  Sit-to-Stand Transfer Training Transfer Safety Analysis: decreased balance;  decreased sequencing ability;  decreased flexibility;  decreased strength;  impaired balance;  impaired coordination;  rollator    Toilet Transfer Training  Transfer Training Toilet Transfer: minimum assist (75% patient effort);  1 person assist;  verbal cues;  rollator  Toilet Transfer Training Transfer Safety Analysis: decreased balance;  decreased sequencing ability;  decreased flexibility;  decreased strength;  impaired balance;  impaired coordination;  rollator    Therapeutic Exercise  Therapeutic Exercise Charges: Pt ambulated ~30ft w/ Rollator and Min A x1-2     Grooming Training  Grooming Training Assistance: contact guard;  1 person assist;  verbal cues;  standing at bathroom sink;  decreased flexibility;  decreased strength;  impaired balance;  impaired coordination;  impaired postural control    Toilet Training  Toilet Training Assistance: contact guard;  1 person assist;  verbal cues;  decreased flexibility;  decreased strength;  impaired balance;  impaired motor control          PM&R Impression : as above    Current disposition plan recommendation :    subacute rehab placement

## 2024-11-19 NOTE — PROGRESS NOTE ADULT - PROBLEM SELECTOR PLAN 1
Possibly iso hypotension due to taking BP meds vs. orthostatic hypotension vs. UTI vs. uncontrolled BS  CTH with no acute intracranial abnormality of significant changes since 10/03/2024. Noted to have similar AMS on prior admissions.  UA grossly positive with bacteria, leukocyte esterase, WBCs  Orthostatics positive 11/12, s/p 1L NS and additional 500cc NS  Currently AOx3, pt more alert  BPs more under control     - S/p CTX 2g q24hr for UTI x 3d  - PT/OT rec SULMA  - Will discuss with pt's family her baseline mental status, as well as acquiring more support for her at home  - C/w amlodipine 5mg qd Possibly iso hypotension due to taking BP meds vs. orthostatic hypotension vs. UTI vs. uncontrolled BS  CTH with no acute intracranial abnormality of significant changes since 10/03/2024. Noted to have similar AMS on prior admissions.  UA grossly positive with bacteria, leukocyte esterase, WBCs  Orthostatics positive 11/12, s/p 1.5L NS, additional 500 cc bolus given yesterday  Currently AOx3, pt more alert  BPs more under control     - S/p CTX 2g q24hr for UTI x 3d  - PT/OT rec SULMA  - C/w amlodipine 5mg qd Possibly iso hypotension due to taking BP meds vs. orthostatic hypotension vs. UTI vs. uncontrolled BS  CTH with no acute intracranial abnormality of significant changes since 10/03/2024. Noted to have similar AMS on prior admissions.  UA grossly positive with bacteria, leukocyte esterase, WBCs  Orthostatics positive 11/12, s/p 1.5L NS, additional 500 cc bolus given yesterday  Currently AOx3, pt more alert  BPs more under control     - S/p CTX 2g q24hr for UTI x 3d  - PT/OT rec SULMA  - Increase amlodipine 5mg to 10mg qd

## 2024-11-19 NOTE — PROGRESS NOTE ADULT - PROBLEM SELECTOR PLAN 3
Home regiment: lantus 23U when not eating, admelog 14U for regular meal.  Pt now with borderline low blood glucose    - C/w lantus 26u units  - Decrease lispro 14u premeal  - miSS  - Monitor FS Home regiment: lantus 23U when not eating, admelog 14U for regular meal.  Pt now with borderline low blood glucose    - C/w lantus 26u units  - C/w lispro 14u premeal  - miSS  - Monitor FS

## 2024-11-19 NOTE — PROGRESS NOTE ADULT - PROBLEM SELECTOR PLAN 7
Follows Dr. Delaney at [facility]. receiving chemo and radiation therapy.    - per heme/onc, pt has not had chemotherapy for several weeks due to intermittent forgetfulness  --> pt should restart her treatment, but at this time placement or at least placement home with proper services takes precedence  - f/u heme/onc recs

## 2024-11-19 NOTE — PROGRESS NOTE ADULT - SUBJECTIVE AND OBJECTIVE BOX
INTERVAL HPI/OVERNIGHT EVENTS:  Awake and alert;  Glucoses noted;   On low side today  Creat down  BP up this morning       MEDICATIONS  (STANDING):  amLODIPine   Tablet 5 milliGRAM(s) Oral every 24 hours  atorvastatin 40 milliGRAM(s) Oral at bedtime  dextrose 5%. 1000 milliLiter(s) (100 mL/Hr) IV Continuous <Continuous>  dextrose 5%. 1000 milliLiter(s) (50 mL/Hr) IV Continuous <Continuous>  dextrose 50% Injectable 25 Gram(s) IV Push once  dextrose 50% Injectable 12.5 Gram(s) IV Push once  dextrose 50% Injectable 25 Gram(s) IV Push once  glucagon  Injectable 1 milliGRAM(s) IntraMuscular once  heparin   Injectable 5000 Unit(s) SubCutaneous every 8 hours  insulin glargine Injectable (LANTUS) 26 Unit(s) SubCutaneous at bedtime  insulin lispro (ADMELOG) corrective regimen sliding scale   SubCutaneous Before meals and at bedtime  insulin lispro Injectable (ADMELOG) 14 Unit(s) SubCutaneous three times a day before meals  lidocaine   4% Patch 1 Patch Transdermal every 24 hours  lidocaine   4% Patch 1 Patch Transdermal every 24 hours    MEDICATIONS  (PRN):  acetaminophen     Tablet .. 650 milliGRAM(s) Oral every 6 hours PRN Temp greater or equal to 38C (100.4F), Mild Pain (1 - 3)  dextrose Oral Gel 15 Gram(s) Oral once PRN Blood Glucose LESS THAN 70 milliGRAM(s)/deciliter      Allergies    citrus (Urticaria)  Bactrim (Rash)    Intolerances        Vital Signs Last 24 Hrs  T(C): 36.8 (19 Nov 2024 05:31), Max: 36.8 (19 Nov 2024 05:31)  T(F): 98.2 (19 Nov 2024 05:31), Max: 98.2 (19 Nov 2024 05:31)  HR: 79 (19 Nov 2024 05:31) (72 - 81)  BP: 152/67 (19 Nov 2024 05:49) (150/88 - 174/75)  BP(mean): 108 (19 Nov 2024 05:31) (108 - 108)  RR: 18 (19 Nov 2024 05:31) (18 - 18)  SpO2: 95% (19 Nov 2024 05:31) (95% - 97%)    Parameters below as of 19 Nov 2024 05:31  Patient On (Oxygen Delivery Method): room air              Constitutional: Awake     Eyes: NEDRA    ENMT: Negative    Neck: Supple    Back:  no tenderness     Respiratory:  clear     Cardiovascular: S1 S2    Gastrointestinal: soft     Genitourinary:    Extremities: mo edema     Vascular:    Neurological:  Less confusion today     Skin:    Lymph Nodes:            11-18 @ 07:01  -  11-19 @ 07:00  --------------------------------------------------------  IN: 0 mL / OUT: 100 mL / NET: -100 mL      LABS:                        11.6   6.86  )-----------( 181      ( 18 Nov 2024 14:00 )             36.6     11-18    141  |  105  |  33[H]  ----------------------------<  182[H]  5.1   |  27  |  1.91[H]    Ca    9.1      18 Nov 2024 14:00  Phos  3.6     11-18  Mg     1.8     11-18        Urinalysis Basic - ( 18 Nov 2024 14:00 )    Color: x / Appearance: x / SG: x / pH: x  Gluc: 182 mg/dL / Ketone: x  / Bili: x / Urobili: x   Blood: x / Protein: x / Nitrite: x   Leuk Esterase: x / RBC: x / WBC x   Sq Epi: x / Non Sq Epi: x / Bacteria: x        RADIOLOGY & ADDITIONAL TESTS:

## 2024-11-19 NOTE — PROGRESS NOTE ADULT - PROBLEM SELECTOR PLAN 5
Pt with new bilateral calf tenderness, R > L. Pt has not been ambulating as much as she would like.  Currently on heparin subq q8hr for DVT ppx.    - F/u bilateral LE dopplers Pt with new bilateral calf tenderness, R > L. Pt has not been ambulating as much as she would like.  Currently on heparin subq q8hr for DVT ppx.  B/l LE dopplers without DVT    - CTM

## 2024-11-19 NOTE — PROGRESS NOTE ADULT - SUBJECTIVE AND OBJECTIVE BOX
OVERNIGHT EVENTS:    SUBJECTIVE / INTERVAL HPI: Patient seen and examined at bedside.       PHYSICAL EXAM:    General: NAD  HEENT: NC/AT; PERRL, anicteric sclera; MMM  Neck: supple  Cardiovascular: +S1/S2, RRR  Respiratory: CTA B/L; no W/R/R  Gastrointestinal: soft, NT/ND; +BSx4  Extremities: WWP; no edema, clubbing or cyanosis  Vascular: 2+ radial, DP/PT pulses B/L  Neurological: AAOx3; no focal deficits  Psychiatric: pleasant mood and affect  Dermatologic: no appreciable wounds or damage to the skin    VITAL SIGNS:  Vital Signs Last 24 Hrs  T(C): 36.8 (19 Nov 2024 05:31), Max: 36.8 (19 Nov 2024 05:31)  T(F): 98.2 (19 Nov 2024 05:31), Max: 98.2 (19 Nov 2024 05:31)  HR: 79 (19 Nov 2024 05:31) (72 - 81)  BP: 152/67 (19 Nov 2024 05:49) (150/88 - 174/75)  BP(mean): 108 (19 Nov 2024 05:31) (108 - 108)  RR: 18 (19 Nov 2024 05:31) (18 - 18)  SpO2: 95% (19 Nov 2024 05:31) (95% - 97%)    Parameters below as of 19 Nov 2024 05:31  Patient On (Oxygen Delivery Method): room air          MEDICATIONS:  MEDICATIONS  (STANDING):  amLODIPine   Tablet 5 milliGRAM(s) Oral every 24 hours  atorvastatin 40 milliGRAM(s) Oral at bedtime  dextrose 5%. 1000 milliLiter(s) (100 mL/Hr) IV Continuous <Continuous>  dextrose 5%. 1000 milliLiter(s) (50 mL/Hr) IV Continuous <Continuous>  dextrose 50% Injectable 25 Gram(s) IV Push once  dextrose 50% Injectable 12.5 Gram(s) IV Push once  dextrose 50% Injectable 25 Gram(s) IV Push once  glucagon  Injectable 1 milliGRAM(s) IntraMuscular once  heparin   Injectable 5000 Unit(s) SubCutaneous every 8 hours  insulin glargine Injectable (LANTUS) 26 Unit(s) SubCutaneous at bedtime  insulin lispro (ADMELOG) corrective regimen sliding scale   SubCutaneous Before meals and at bedtime  insulin lispro Injectable (ADMELOG) 14 Unit(s) SubCutaneous three times a day before meals  lidocaine   4% Patch 1 Patch Transdermal every 24 hours  lidocaine   4% Patch 1 Patch Transdermal every 24 hours    MEDICATIONS  (PRN):  acetaminophen     Tablet .. 650 milliGRAM(s) Oral every 6 hours PRN Temp greater or equal to 38C (100.4F), Mild Pain (1 - 3)  dextrose Oral Gel 15 Gram(s) Oral once PRN Blood Glucose LESS THAN 70 milliGRAM(s)/deciliter      ALLERGIES:  Allergies    citrus (Urticaria)  Bactrim (Rash)    Intolerances        LABS:                        11.6   6.86  )-----------( 181      ( 18 Nov 2024 14:00 )             36.6     11-18    141  |  105  |  33[H]  ----------------------------<  182[H]  5.1   |  27  |  1.91[H]    Ca    9.1      18 Nov 2024 14:00  Phos  3.6     11-18  Mg     1.8     11-18        Urinalysis Basic - ( 18 Nov 2024 14:00 )    Color: x / Appearance: x / SG: x / pH: x  Gluc: 182 mg/dL / Ketone: x  / Bili: x / Urobili: x   Blood: x / Protein: x / Nitrite: x   Leuk Esterase: x / RBC: x / WBC x   Sq Epi: x / Non Sq Epi: x / Bacteria: x      CAPILLARY BLOOD GLUCOSE      POCT Blood Glucose.: 126 mg/dL (18 Nov 2024 21:52)      RADIOLOGY & ADDITIONAL TESTS: Reviewed. OVERNIGHT EVENTS: LILY.    SUBJECTIVE / INTERVAL HPI: Patient seen and examined at bedside. Pt resting comfortably. States she had a HA earlier this AM, which is now resolved. States she has been getting up and moving around a little bit. Last BM 2d ago. Denies fevers, chills, HA, chest pain, SOB, n/v/d, abd pain, dysuria. ROS otherwise negative.      PHYSICAL EXAM:    General: NAD  HEENT: NC/AT; PERRL, anicteric sclera; MMM  Neck: supple  Cardiovascular: +S1/S2, RRR; systolic murmur present  Respiratory: CTA B/L; no W/R/R  Gastrointestinal: soft, NT/ND; +BSx4  Extremities: WWP; no edema, clubbing or cyanosis  Vascular: 2+ radial, DP/PT pulses B/L  Neurological: AAOx3; no focal deficits  Psychiatric: pleasant mood and affect  Dermatologic: no appreciable wounds or damage to the skin    VITAL SIGNS:  Vital Signs Last 24 Hrs  T(C): 36.8 (19 Nov 2024 05:31), Max: 36.8 (19 Nov 2024 05:31)  T(F): 98.2 (19 Nov 2024 05:31), Max: 98.2 (19 Nov 2024 05:31)  HR: 79 (19 Nov 2024 05:31) (72 - 81)  BP: 152/67 (19 Nov 2024 05:49) (150/88 - 174/75)  BP(mean): 108 (19 Nov 2024 05:31) (108 - 108)  RR: 18 (19 Nov 2024 05:31) (18 - 18)  SpO2: 95% (19 Nov 2024 05:31) (95% - 97%)    Parameters below as of 19 Nov 2024 05:31  Patient On (Oxygen Delivery Method): room air          MEDICATIONS:  MEDICATIONS  (STANDING):  amLODIPine   Tablet 5 milliGRAM(s) Oral every 24 hours  atorvastatin 40 milliGRAM(s) Oral at bedtime  dextrose 5%. 1000 milliLiter(s) (100 mL/Hr) IV Continuous <Continuous>  dextrose 5%. 1000 milliLiter(s) (50 mL/Hr) IV Continuous <Continuous>  dextrose 50% Injectable 25 Gram(s) IV Push once  dextrose 50% Injectable 12.5 Gram(s) IV Push once  dextrose 50% Injectable 25 Gram(s) IV Push once  glucagon  Injectable 1 milliGRAM(s) IntraMuscular once  heparin   Injectable 5000 Unit(s) SubCutaneous every 8 hours  insulin glargine Injectable (LANTUS) 26 Unit(s) SubCutaneous at bedtime  insulin lispro (ADMELOG) corrective regimen sliding scale   SubCutaneous Before meals and at bedtime  insulin lispro Injectable (ADMELOG) 14 Unit(s) SubCutaneous three times a day before meals  lidocaine   4% Patch 1 Patch Transdermal every 24 hours  lidocaine   4% Patch 1 Patch Transdermal every 24 hours    MEDICATIONS  (PRN):  acetaminophen     Tablet .. 650 milliGRAM(s) Oral every 6 hours PRN Temp greater or equal to 38C (100.4F), Mild Pain (1 - 3)  dextrose Oral Gel 15 Gram(s) Oral once PRN Blood Glucose LESS THAN 70 milliGRAM(s)/deciliter      ALLERGIES:  Allergies    citrus (Urticaria)  Bactrim (Rash)    Intolerances        LABS:                        11.6   6.86  )-----------( 181      ( 18 Nov 2024 14:00 )             36.6     11-18    141  |  105  |  33[H]  ----------------------------<  182[H]  5.1   |  27  |  1.91[H]    Ca    9.1      18 Nov 2024 14:00  Phos  3.6     11-18  Mg     1.8     11-18        Urinalysis Basic - ( 18 Nov 2024 14:00 )    Color: x / Appearance: x / SG: x / pH: x  Gluc: 182 mg/dL / Ketone: x  / Bili: x / Urobili: x   Blood: x / Protein: x / Nitrite: x   Leuk Esterase: x / RBC: x / WBC x   Sq Epi: x / Non Sq Epi: x / Bacteria: x      CAPILLARY BLOOD GLUCOSE      POCT Blood Glucose.: 126 mg/dL (18 Nov 2024 21:52)      RADIOLOGY & ADDITIONAL TESTS: Reviewed.

## 2024-11-19 NOTE — CHART NOTE - NSCHARTNOTEFT_GEN_A_CORE
Admitting Diagnosis:   Patient is a 74y old  Female who presents with a chief complaint of AMS (2024 06:44)    PAST MEDICAL & SURGICAL HISTORY:  HTN (hypertension)  HLD (hyperlipidemia)  DM (diabetes mellitus), type 2  Rheumatoid arthritis  Degenerative joint disease (DJD) of lumbar spine  Lung cancer metastatic to brain  Stage 3 chronic kidney disease  S/P total right hip arthroplasty    Current Nutrition Order:  Consistent Carbohydrate/No Snacks    PO Intake: Good (%) [ x ]  Fair (50-75%) [   ] Poor (<25%) [   ]    GI Issues:   Pt denies nausea/vomiting/diarrhea/constipation  Reports last BM yesterday, note last recorded BM per nursing 11/15  No abdominal distension/discomfort noted     Pain:  No pain reported     Skin Integrity:  No pressure injuries or edema documented, Fabricio score 19    24 @ 07:01  -  24 @ 07:00  --------------------------------------------------------  IN: 0 mL / OUT: 100 mL / NET: -100 mL    Labs:       141  |  105  |  33[H]  ----------------------------<  182[H]  5.1   |  27  |  1.91[H]    Ca    9.1      2024 14:00  Phos  3.6       Mg     1.8         CAPILLARY BLOOD GLUCOSE  POCT Blood Glucose.: 81 mg/dL (2024 08:25)  POCT Blood Glucose.: 126 mg/dL (2024 21:52)  POCT Blood Glucose.: 118 mg/dL (2024 17:27)  POCT Blood Glucose.: 138 mg/dL (2024 12:14)    Medications:  MEDICATIONS  (STANDING):  amLODIPine   Tablet 5 milliGRAM(s) Oral every 24 hours  atorvastatin 40 milliGRAM(s) Oral at bedtime  dextrose 5%. 1000 milliLiter(s) (100 mL/Hr) IV Continuous <Continuous>  dextrose 5%. 1000 milliLiter(s) (50 mL/Hr) IV Continuous <Continuous>  dextrose 50% Injectable 25 Gram(s) IV Push once  dextrose 50% Injectable 12.5 Gram(s) IV Push once  dextrose 50% Injectable 25 Gram(s) IV Push once  glucagon  Injectable 1 milliGRAM(s) IntraMuscular once  heparin   Injectable 5000 Unit(s) SubCutaneous every 8 hours  insulin glargine Injectable (LANTUS) 26 Unit(s) SubCutaneous at bedtime  insulin lispro (ADMELOG) corrective regimen sliding scale   SubCutaneous Before meals and at bedtime  insulin lispro Injectable (ADMELOG) 14 Unit(s) SubCutaneous three times a day before meals  lidocaine   4% Patch 1 Patch Transdermal every 24 hours  lidocaine   4% Patch 1 Patch Transdermal every 24 hours    MEDICATIONS  (PRN):  acetaminophen     Tablet .. 650 milliGRAM(s) Oral every 6 hours PRN Temp greater or equal to 38C (100.4F), Mild Pain (1 - 3)  dextrose Oral Gel 15 Gram(s) Oral once PRN Blood Glucose LESS THAN 70 milliGRAM(s)/deciliter    Anthropometrics:  Height: 5'3"  Weight: 140lb/63.4kg  BMI: 24.7  IBW: 115lb/52.1kg            122%IBW    Weight Change:   No new weights obtained since admission. Recommend nursing to trend weekly weights. RD to continue monitoring weights as able.    Estimated energy needs:   Calories: 25-30kcal/k-1563kcal/d  Protein: 0.9-1.1g/k-57g/d  Fluids: 30-35mL/k-1823mL/d  Estimated needs based on IBW as dosing wt 140lb/63.4kg >120% IBW (122%). Needs adjusted for age, BMI, renal with DM, and clinical status.    Subjective:   74F with PMH of lung cancer with mets to brain, CKD, HLD, and DM2 who presented with AMS in setting of hypotension, admitted for management.     Pt seen on 7WO for follow-up. Labs and medication orders reviewed. Ordered for 14U premeal admelog, 26U lantus. No updated labs , POC blood glucose (-) 110-138. On Consistent Carbohydrate diet. Pt sitting up comfortably having breakfast on visit, disoriented consistent with nursing documentation. RD observed pt complete 100% breakfast tray of pancakes and fruit this AM. Pt endorses ongoing good appetite and completing meals. Reports no difficulty chewing/swallowing. Denies GI discomfort, reports regular BMs with last BM yesterday, ?last recorded BM per nursing x4d ago - no bowel regimen ordered. See nutrition recommendations. RD to remain available.     Previous Nutrition Diagnosis:  Altered nutrition related lab values related to endocrine dysfunction as evidenced by elevated POC blood glucose.     Active [ x ]  Resolved [   ]    Goal:  Pt diet to align with nutrition recommendations while consistently meeting >75% nutrient needs.     Recommendations:  1. Continue Consistent Carbohydrate diet.   >>Encourage & monitor PO intake. [place] dietary preferences as able.   2. Monitor GI tolerance, weight trends, labs, & skin integrity.  3. Defer bowel and pain regimens to team.   >>Monitor BMs and consider bowel regimen prn.   4. RD to remain available for diet education/intervention prn.     Education:   Encouraged continued adequate intake of meals and hydration. DM nutrition education limited in setting of pt cognitive status. Pt aware RD remains available for additional questions/concerns.     Risk Level: High [   ] Moderate [ x ] Low [   ]

## 2024-11-19 NOTE — PROGRESS NOTE ADULT - PROBLEM SELECTOR PLAN 6
UA grossly positive with bacteria, leukocyte esterase, WBCs. Pt without leukocytosis or fever, denies any urinary sxs, but is a poor historian. RESOLVED.    - S/p CTX 2g q24hr x 3d

## 2024-11-19 NOTE — PROGRESS NOTE ADULT - PROBLEM SELECTOR PLAN 2
Baseline Cr 1.56. On admission 1.90. Most recently 2.13.  FeNa 0.4%, prerenal    - Repeat urine lytes  - Continue to trend Cr  - Avoid nephrotoxic agents  - Adjust medication dosages for level of renal function Baseline Cr 1.56. On admission 1.90. Now downtrending.  Repeat FeNa 0.6%, prerenal  S/p additional 500 cc bolus    - Continue to trend Cr  - Avoid nephrotoxic agents  - Adjust medication dosages for level of renal function

## 2024-11-20 ENCOUNTER — NON-APPOINTMENT (OUTPATIENT)
Age: 74
End: 2024-11-20

## 2024-11-20 VITALS
HEART RATE: 91 BPM | OXYGEN SATURATION: 96 % | TEMPERATURE: 98 F | RESPIRATION RATE: 18 BRPM | DIASTOLIC BLOOD PRESSURE: 82 MMHG | SYSTOLIC BLOOD PRESSURE: 118 MMHG

## 2024-11-20 PROCEDURE — 93970 EXTREMITY STUDY: CPT

## 2024-11-20 PROCEDURE — 85025 COMPLETE CBC W/AUTO DIFF WBC: CPT

## 2024-11-20 PROCEDURE — 83935 ASSAY OF URINE OSMOLALITY: CPT

## 2024-11-20 PROCEDURE — 84540 ASSAY OF URINE/UREA-N: CPT

## 2024-11-20 PROCEDURE — 86900 BLOOD TYPING SEROLOGIC ABO: CPT

## 2024-11-20 PROCEDURE — 83036 HEMOGLOBIN GLYCOSYLATED A1C: CPT

## 2024-11-20 PROCEDURE — 81001 URINALYSIS AUTO W/SCOPE: CPT

## 2024-11-20 PROCEDURE — 86901 BLOOD TYPING SEROLOGIC RH(D): CPT

## 2024-11-20 PROCEDURE — 85379 FIBRIN DEGRADATION QUANT: CPT

## 2024-11-20 PROCEDURE — 82962 GLUCOSE BLOOD TEST: CPT

## 2024-11-20 PROCEDURE — 83735 ASSAY OF MAGNESIUM: CPT

## 2024-11-20 PROCEDURE — 97162 PT EVAL MOD COMPLEX 30 MIN: CPT

## 2024-11-20 PROCEDURE — 70450 CT HEAD/BRAIN W/O DYE: CPT | Mod: MC

## 2024-11-20 PROCEDURE — 99285 EMERGENCY DEPT VISIT HI MDM: CPT | Mod: 25

## 2024-11-20 PROCEDURE — 71045 X-RAY EXAM CHEST 1 VIEW: CPT

## 2024-11-20 PROCEDURE — 97535 SELF CARE MNGMENT TRAINING: CPT

## 2024-11-20 PROCEDURE — 97110 THERAPEUTIC EXERCISES: CPT

## 2024-11-20 PROCEDURE — 85027 COMPLETE CBC AUTOMATED: CPT

## 2024-11-20 PROCEDURE — 80048 BASIC METABOLIC PNL TOTAL CA: CPT

## 2024-11-20 PROCEDURE — 84300 ASSAY OF URINE SODIUM: CPT

## 2024-11-20 PROCEDURE — 80053 COMPREHEN METABOLIC PANEL: CPT

## 2024-11-20 PROCEDURE — 82570 ASSAY OF URINE CREATININE: CPT

## 2024-11-20 PROCEDURE — 86850 RBC ANTIBODY SCREEN: CPT

## 2024-11-20 PROCEDURE — 84156 ASSAY OF PROTEIN URINE: CPT

## 2024-11-20 PROCEDURE — 84484 ASSAY OF TROPONIN QUANT: CPT

## 2024-11-20 PROCEDURE — 97116 GAIT TRAINING THERAPY: CPT

## 2024-11-20 PROCEDURE — 84100 ASSAY OF PHOSPHORUS: CPT

## 2024-11-20 PROCEDURE — 36415 COLL VENOUS BLD VENIPUNCTURE: CPT

## 2024-11-20 PROCEDURE — 93005 ELECTROCARDIOGRAM TRACING: CPT

## 2024-11-20 PROCEDURE — 97165 OT EVAL LOW COMPLEX 30 MIN: CPT

## 2024-11-20 PROCEDURE — 84133 ASSAY OF URINE POTASSIUM: CPT

## 2024-11-20 RX ORDER — SODIUM CHLORIDE 9 MG/ML
10 INJECTION, SOLUTION INTRAMUSCULAR; INTRAVENOUS; SUBCUTANEOUS ONCE
Refills: 0 | Status: DISCONTINUED | OUTPATIENT
Start: 2024-12-10 | End: 2024-12-25

## 2024-11-20 RX ORDER — LIDOCAINE 40 MG/G
1 CREAM TOPICAL ONCE
Refills: 0 | Status: DISCONTINUED | OUTPATIENT
Start: 2024-12-10 | End: 2024-12-25

## 2024-11-20 RX ORDER — INSULIN GLARGINE 100 [IU]/ML
25 INJECTION, SOLUTION SUBCUTANEOUS AT BEDTIME
Refills: 0 | Status: DISCONTINUED | OUTPATIENT
Start: 2024-11-20 | End: 2024-11-20

## 2024-11-20 RX ADMIN — LIDOCAINE 1 PATCH: 40 CREAM TOPICAL at 10:02

## 2024-11-20 RX ADMIN — Medication 14 UNIT(S): at 08:41

## 2024-11-20 RX ADMIN — LIDOCAINE 1 PATCH: 40 CREAM TOPICAL at 00:18

## 2024-11-20 RX ADMIN — AMLODIPINE BESYLATE 10 MILLIGRAM(S): 10 TABLET ORAL at 10:01

## 2024-11-20 NOTE — PROGRESS NOTE ADULT - PROBLEM SELECTOR PLAN 5
Pt with new bilateral calf tenderness, R > L. Pt has not been ambulating as much as she would like.  Currently on heparin subq q8hr for DVT ppx.  B/l LE dopplers without DVT    - CTM

## 2024-11-20 NOTE — PROGRESS NOTE ADULT - SUBJECTIVE AND OBJECTIVE BOX
OVERNIGHT EVENTS:    SUBJECTIVE / INTERVAL HPI: Patient seen and examined at bedside.       PHYSICAL EXAM:    General: NAD  HEENT: NC/AT; PERRL, anicteric sclera; MMM  Neck: supple  Cardiovascular: +S1/S2, RRR  Respiratory: CTA B/L; no W/R/R  Gastrointestinal: soft, NT/ND; +BSx4  Extremities: WWP; no edema, clubbing or cyanosis  Vascular: 2+ radial, DP/PT pulses B/L  Neurological: AAOx3; no focal deficits  Psychiatric: pleasant mood and affect  Dermatologic: no appreciable wounds or damage to the skin    VITAL SIGNS:  Vital Signs Last 24 Hrs  T(C): 36.7 (20 Nov 2024 05:52), Max: 36.7 (19 Nov 2024 21:00)  T(F): 98 (20 Nov 2024 05:52), Max: 98 (19 Nov 2024 21:00)  HR: 79 (20 Nov 2024 05:52) (72 - 79)  BP: 152/72 (20 Nov 2024 05:52) (152/72 - 161/81)  BP(mean): --  RR: 16 (20 Nov 2024 05:52) (15 - 16)  SpO2: 94% (20 Nov 2024 05:52) (94% - 97%)    Parameters below as of 20 Nov 2024 05:52  Patient On (Oxygen Delivery Method): room air          MEDICATIONS:  MEDICATIONS  (STANDING):  amLODIPine   Tablet 10 milliGRAM(s) Oral every 24 hours  atorvastatin 40 milliGRAM(s) Oral at bedtime  dextrose 5%. 1000 milliLiter(s) (100 mL/Hr) IV Continuous <Continuous>  dextrose 5%. 1000 milliLiter(s) (50 mL/Hr) IV Continuous <Continuous>  dextrose 50% Injectable 25 Gram(s) IV Push once  dextrose 50% Injectable 12.5 Gram(s) IV Push once  dextrose 50% Injectable 25 Gram(s) IV Push once  glucagon  Injectable 1 milliGRAM(s) IntraMuscular once  heparin   Injectable 5000 Unit(s) SubCutaneous every 8 hours  insulin glargine Injectable (LANTUS) 26 Unit(s) SubCutaneous at bedtime  insulin lispro (ADMELOG) corrective regimen sliding scale   SubCutaneous Before meals and at bedtime  insulin lispro Injectable (ADMELOG) 14 Unit(s) SubCutaneous three times a day before meals  lidocaine   4% Patch 1 Patch Transdermal every 24 hours  lidocaine   4% Patch 1 Patch Transdermal every 24 hours    MEDICATIONS  (PRN):  acetaminophen     Tablet .. 650 milliGRAM(s) Oral every 6 hours PRN Temp greater or equal to 38C (100.4F), Mild Pain (1 - 3)  dextrose Oral Gel 15 Gram(s) Oral once PRN Blood Glucose LESS THAN 70 milliGRAM(s)/deciliter      ALLERGIES:  Allergies    citrus (Urticaria)  Bactrim (Rash)    Intolerances        LABS:                        11.6   6.86  )-----------( 181      ( 18 Nov 2024 14:00 )             36.6     11-18    141  |  105  |  33[H]  ----------------------------<  182[H]  5.1   |  27  |  1.91[H]    Ca    9.1      18 Nov 2024 14:00  Phos  3.6     11-18  Mg     1.8     11-18        Urinalysis Basic - ( 18 Nov 2024 14:00 )    Color: x / Appearance: x / SG: x / pH: x  Gluc: 182 mg/dL / Ketone: x  / Bili: x / Urobili: x   Blood: x / Protein: x / Nitrite: x   Leuk Esterase: x / RBC: x / WBC x   Sq Epi: x / Non Sq Epi: x / Bacteria: x      CAPILLARY BLOOD GLUCOSE      POCT Blood Glucose.: 304 mg/dL (19 Nov 2024 21:28)      RADIOLOGY & ADDITIONAL TESTS: Reviewed. OVERNIGHT EVENTS: LILY.    SUBJECTIVE / INTERVAL HPI: Patient seen and examined at bedside. Pt resting comfortably. Denies any acute complaints. States she is excited to go to rehab. Denies fevers, chills, HA, chest pain, SOB, n/v/d, abd pain, dysuria. ROS otherwise negative.      PHYSICAL EXAM:    General: NAD  HEENT: NC/AT; PERRL, anicteric sclera; MMM  Neck: supple  Cardiovascular: +S1/S2, RRR; systolic murmur present  Respiratory: CTA B/L; no W/R/R  Gastrointestinal: soft, NT/ND; +BSx4  Extremities: WWP; no edema, clubbing or cyanosis  Vascular: 2+ radial, DP/PT pulses B/L  Neurological: AAOx3; no focal deficits  Psychiatric: pleasant mood and affect  Dermatologic: no appreciable wounds or damage to the skin    VITAL SIGNS:  Vital Signs Last 24 Hrs  T(C): 36.7 (20 Nov 2024 05:52), Max: 36.7 (19 Nov 2024 21:00)  T(F): 98 (20 Nov 2024 05:52), Max: 98 (19 Nov 2024 21:00)  HR: 79 (20 Nov 2024 05:52) (72 - 79)  BP: 152/72 (20 Nov 2024 05:52) (152/72 - 161/81)  BP(mean): --  RR: 16 (20 Nov 2024 05:52) (15 - 16)  SpO2: 94% (20 Nov 2024 05:52) (94% - 97%)    Parameters below as of 20 Nov 2024 05:52  Patient On (Oxygen Delivery Method): room air          MEDICATIONS:  MEDICATIONS  (STANDING):  amLODIPine   Tablet 10 milliGRAM(s) Oral every 24 hours  atorvastatin 40 milliGRAM(s) Oral at bedtime  dextrose 5%. 1000 milliLiter(s) (100 mL/Hr) IV Continuous <Continuous>  dextrose 5%. 1000 milliLiter(s) (50 mL/Hr) IV Continuous <Continuous>  dextrose 50% Injectable 25 Gram(s) IV Push once  dextrose 50% Injectable 12.5 Gram(s) IV Push once  dextrose 50% Injectable 25 Gram(s) IV Push once  glucagon  Injectable 1 milliGRAM(s) IntraMuscular once  heparin   Injectable 5000 Unit(s) SubCutaneous every 8 hours  insulin glargine Injectable (LANTUS) 26 Unit(s) SubCutaneous at bedtime  insulin lispro (ADMELOG) corrective regimen sliding scale   SubCutaneous Before meals and at bedtime  insulin lispro Injectable (ADMELOG) 14 Unit(s) SubCutaneous three times a day before meals  lidocaine   4% Patch 1 Patch Transdermal every 24 hours  lidocaine   4% Patch 1 Patch Transdermal every 24 hours    MEDICATIONS  (PRN):  acetaminophen     Tablet .. 650 milliGRAM(s) Oral every 6 hours PRN Temp greater or equal to 38C (100.4F), Mild Pain (1 - 3)  dextrose Oral Gel 15 Gram(s) Oral once PRN Blood Glucose LESS THAN 70 milliGRAM(s)/deciliter      ALLERGIES:  Allergies    citrus (Urticaria)  Bactrim (Rash)    Intolerances        LABS:                        11.6   6.86  )-----------( 181      ( 18 Nov 2024 14:00 )             36.6     11-18    141  |  105  |  33[H]  ----------------------------<  182[H]  5.1   |  27  |  1.91[H]    Ca    9.1      18 Nov 2024 14:00  Phos  3.6     11-18  Mg     1.8     11-18        Urinalysis Basic - ( 18 Nov 2024 14:00 )    Color: x / Appearance: x / SG: x / pH: x  Gluc: 182 mg/dL / Ketone: x  / Bili: x / Urobili: x   Blood: x / Protein: x / Nitrite: x   Leuk Esterase: x / RBC: x / WBC x   Sq Epi: x / Non Sq Epi: x / Bacteria: x      CAPILLARY BLOOD GLUCOSE      POCT Blood Glucose.: 304 mg/dL (19 Nov 2024 21:28)      RADIOLOGY & ADDITIONAL TESTS: Reviewed.

## 2024-11-20 NOTE — PROGRESS NOTE ADULT - PROBLEM SELECTOR PLAN 2
Baseline Cr 1.56. On admission 1.90. Now downtrending.  Repeat FeNa 0.6%, prerenal  S/p additional 500 cc bolus    - Continue to trend Cr  - Avoid nephrotoxic agents  - Adjust medication dosages for level of renal function

## 2024-11-20 NOTE — PROGRESS NOTE ADULT - PROVIDER SPECIALTY LIST ADULT
Internal Medicine
Rehab Medicine
Internal Medicine
Rehab Medicine
Internal Medicine

## 2024-11-20 NOTE — PROGRESS NOTE ADULT - PROBLEM SELECTOR PROBLEM 6
Quality 226: Preventive Care And Screening: Tobacco Use: Screening And Cessation Intervention: Patient screened for tobacco use and is an ex/non-smoker Quality 110: Preventive Care And Screening: Influenza Immunization: Influenza Immunization Administered during Influenza season Quality 431: Preventive Care And Screening: Unhealthy Alcohol Use - Screening: Patient screened for unhealthy alcohol use using a single question and scores less than 2 times per year Detail Level: Detailed Acute UTI

## 2024-11-20 NOTE — PROGRESS NOTE ADULT - PROBLEM/PLAN-7
Faxed over Pre Op clearance. Placed everything in brown accordion file on desk.
DISPLAY PLAN FREE TEXT

## 2024-11-20 NOTE — PROGRESS NOTE ADULT - TIME BILLING
Patient seen and examined  Will go to [facility] today  Will contact daughter   Insulin change noted Patient seen and examined  Will go to [facility] today  Will contact daughter   Insulin change noted; Glucoses continue to be an issue     Addendum\  Spoke with patients daughter ;  She is aware of patients discharge today to [facility]

## 2024-11-20 NOTE — PROGRESS NOTE ADULT - PROBLEM SELECTOR PLAN 7
Follows Dr. Delaney at [facility]. receiving chemo and radiation therapy.    - per heme/onc, pt has not had chemotherapy for several weeks due to intermittent forgetfulness  --> pt should restart her treatment, but at this time placement or at least placement home with proper services takes precedence  - f/u heme/onc recs

## 2024-11-20 NOTE — PROGRESS NOTE ADULT - PROBLEM SELECTOR PLAN 8
Last admission, nifedipine ER 60mg. But claims that she takes a medication that starts with "X"    - CTM BPs  - Increase amlodipine 5mg to 10mg qd Last admission, nifedipine ER 60mg. But claims that she takes a medication that starts with "X"    - CTM BPs  - C/w amlodipine 10mg qd

## 2024-11-20 NOTE — PROGRESS NOTE ADULT - PROBLEM SELECTOR PLAN 1
Possibly iso hypotension due to taking BP meds vs. orthostatic hypotension vs. UTI vs. uncontrolled BS  CTH with no acute intracranial abnormality of significant changes since 10/03/2024. Noted to have similar AMS on prior admissions.  UA grossly positive with bacteria, leukocyte esterase, WBCs  Orthostatics positive 11/12, s/p 1.5L NS, additional 500 cc bolus given yesterday  Currently AOx3, pt more alert  BPs more under control     - S/p CTX 2g q24hr for UTI x 3d  - PT/OT rec [facility]  - Increase amlodipine 5mg to 10mg qd Possibly iso hypotension due to taking BP meds vs. orthostatic hypotension vs. UTI vs. uncontrolled BS  CTH with no acute intracranial abnormality of significant changes since 10/03/2024. Noted to have similar AMS on prior admissions.  UA grossly positive with bacteria, leukocyte esterase, WBCs  Orthostatics positive 11/12, s/p 1.5L NS, additional 500 cc bolus given yesterday  Currently AOx3, pt more alert  BPs more under control     - S/p CTX 2g q24hr for UTI x 3d  - PT/OT rec [facility]  - C/w amlodipine 10mg qd

## 2024-11-20 NOTE — PROGRESS NOTE ADULT - PROBLEM SELECTOR PLAN 3
Home regiment: lantus 23U when not eating, admelog 14U for regular meal.  Pt now with borderline low blood glucose    - C/w lantus 26u units  - C/w lispro 14u premeal  - miSS  - Monitor FS Home regiment: lantus 23U when not eating, admelog 14U for regular meal.  Pt now with borderline low blood glucose    - Decrease lantus 25u qhs  - C/w lispro 14u premeal  - miSS  - Monitor FS

## 2024-11-21 ENCOUNTER — APPOINTMENT (OUTPATIENT)
Dept: INFUSION THERAPY | Facility: CLINIC | Age: 74
End: 2024-11-21

## 2024-11-21 ENCOUNTER — OUTPATIENT (OUTPATIENT)
Dept: OUTPATIENT SERVICES | Facility: HOSPITAL | Age: 74
LOS: 1 days | End: 2024-11-21

## 2024-11-21 DIAGNOSIS — C34.90 MALIGNANT NEOPLASM OF UNSPECIFIED PART OF UNSPECIFIED BRONCHUS OR LUNG: ICD-10-CM

## 2024-11-21 DIAGNOSIS — Z96.641 PRESENCE OF RIGHT ARTIFICIAL HIP JOINT: Chronic | ICD-10-CM

## 2024-11-25 ENCOUNTER — TRANSCRIPTION ENCOUNTER (OUTPATIENT)
Age: 74
End: 2024-11-25

## 2024-11-29 NOTE — PATIENT PROFILE ADULT - NSPROPOAURINARYCATHETER_GEN_A_NUR
Post Acute Skilled Nursing Home Visit Note     Date of Service: 11/29/2024  Location seen at: [facility]    PCP: Jorge Kilgore MD   Patient Care Team:  Jorge Kilgore MD as PCP - General (Internal Medicine)  Syed Goel MD (Orthopaedic Surgery - Foot & Ankle Surgery)  Vernon Mercedes MD (Internal Medicine - Pulmonary Disease)  GenoHerminia arellano Simran CLOUD DPM (Podiatry - Foot & Ankle Surgery)  Bijan Osorio DO as Post Acute Facility Provider: Physician (Family Practice)  Néstor Montiel CNP as Post Acute Facility Provider: APC (Nurse Practitioner - Family)  Seen by Néstor Montiel CNP today    History of Present Illness: Daniella Ramesh is a 89 year old female presenting to Post Acute Skilled Nursing for: YOON.     This is a 89-year-old female with PMH of COPD, hypertension who recently was hospitalized at [facility] from 11/11 - 11/15/2024 for acute on chronic CHF.  Cardiology was consulted.  Patient was diuresed with IV Lasix.  Echo complete.  Which showed ejection fraction of 65%, moderate mitral regurgitation and pacemaker leads were in the RV.  Nephrology was consulted today due to elevated creatinine.  Patient's viral respiratory panel was positive for parainfluenza 4.  Pulmonology was consulted.  Patient was treated with DuoNebs, antibiotics and IV steroids.  Patient was unable to wean off of her oxygen.  Patient reports she does use oxygen at home nocturnally.  Patient was stabilized and discharged home.  Patient then presented back to [facility] ER on 11/18 for fall and weakness.  Patient was started on cefuroxime for UTI.  Patient was transferred to SNF for rehab    11/29/2024: Patient seen and examined at bedside.  Patient states her dizziness has improved with therapy.  Patient is orthostatics improving.  Patient currently denies any lightheadedness, dizziness.  Patient's weight up to 150 pounds today.  However patient does not appear fluid overloaded.  SHELDON hose were ordered  last week, per patient had not applied yet.  I will discuss with nursing to apply.  Patient denies any other complaints.   No issues or concerns reported per nursing.    HISTORY  Past Medical History:   Diagnosis Date    AF (atrial fibrillation)  (CMD)     Bronchitis     CHF (congestive heart failure)  (CMD)     Chronic kidney disease     COPD (chronic obstructive pulmonary disease)  (CMD)     Emphysema lung  (CMD)     Hypertension     Hypotension     Pneumonia     Spinal stenosis       reports that she has quit smoking. Her smoking use included cigarettes. She has a 65 pack-year smoking history. She has never been exposed to tobacco smoke. She has never used smokeless tobacco. She reports that she does not currently use alcohol. She reports that she does not use drugs.  Past Surgical History:   Procedure Laterality Date    Ectopic pregnancy surgery      Extracapsular cataract removal w insert io lens prosth wo ecp      Pacemaker implant      Tonsillectomy       Family History   Problem Relation Age of Onset    Cancer Mother     Cancer Father         renal    Diabetes Brother     Heart disease Brother      History       Not marked as reviewed during this visit.            PROBLEM LIST:  Patient Active Problem List   Diagnosis    Spinal stenosis of lumbar region    Sciatica of left side    Abnormal CT scan of lung    Hyperlipidemia    Lung nodule    Essential hypertension    Left lumbar radiculitis    Unilateral primary osteoarthritis, left knee    Urge incontinence of urine    Nocturia    Chronic heart failure with preserved ejection fraction  (CMD)    Chronic obstructive pulmonary disease  (CMD)    Stage 3 chronic kidney disease  (CMD)    Atherosclerosis of abdominal aorta (CMD)    Benign paroxysmal positional vertigo of right ear    Pulmonary hypertension  (CMD)    Asymptomatic varicose veins    Bilateral lower extremity edema    Blood pressure check    Dizziness    Impacted cerumen of left ear    Lumbar  radiculopathy    Atrial fibrillation with rapid ventricular response  (CMD)    Atrial fibrillation with RVR  (CMD)    Acute on chronic congestive heart failure, unspecified heart failure type  (CMD)    CHF (congestive heart failure), NYHA class I, acute on chronic, combined  (CMD)    Syncope and collapse       ADVANCE CARE PLANNING:      Patient-Centered Goals: Prolong survival, only if quality of life is meaningful.  Code Status: Full Resuscitation                ALLERGIES:  Allergies as of 11/29/2024 - Reviewed 11/19/2024   Allergen Reaction Noted    Sulfa antibiotics RASH 06/16/2009       CURRENT MEDICATIONS:   Current Outpatient Medications   Medication Sig Dispense Refill    polyethylene glycol (MIRALAX) 17 g packet Take 17 g by mouth daily. Stir and dissolve powder in any 4 to 8 ounces of beverage, then drink.      docusate sodium-sennosides (SENOKOT S) 50-8.6 MG per tablet Take 1 tablet by mouth in the morning and 1 tablet in the evening.      Magnesium 400 MG Tab Take 400 mg by mouth every other day.      torsemide (DEMADEX) 10 MG tablet Take 10 mg by mouth daily.      benzonatate (TESSALON PERLES) 100 MG capsule Take 1 capsule by mouth 3 times daily as needed for Cough. 30 capsule 0    guaiFENesin-DM (MUCINEX DM)  MG per 12 hr tablet Take 2 tablets by mouth every 12 hours.      potassium CHLORIDE (KLOR-CON M) 20 MEQ arianne ER tablet Take 1 tablet by mouth daily (with dinner). 30 tablet 0    digoxin (LANOXIN) 0.125 MG tablet Take 125 mcg by mouth every evening.      acetaminophen (TYLENOL) 500 MG tablet Take 500 mg by mouth every 4 hours as needed for Pain.      albuterol 108 (90 Base) MCG/ACT inhaler Inhale 2 puffs into the lungs every 4 hours as needed for Shortness of Breath or Wheezing. 1 each 0    apixaBAN (ELIQUIS) 2.5 MG Tab Take 1 tablet by mouth every 12 hours. 180 tablet 3    AMIODarone (PACERONE) 200 MG tablet Take 0.5 tablets by mouth daily. Indications: Atrial Fibrillation      formoterol  (PERFOROMIST) 20 MCG/2ML inhalation solution Take 20 mcg by nebulization in the morning and 20 mcg in the evening.      budesonide (PULMICORT) 0.25 MG/2ML nebulizer suspension Take 0.25 mg by nebulization 2 times daily.       No current facility-administered medications for this visit.     Medications reviewed / reconciled: Yes    BASELINE FUNCTIONAL STATUS:  Independent    CURRENT FUNCTIONAL STATUS:  Weight bearing as tolerated (WBAT)    DIET:  Appetite: Fair    REVIEW OF SYSTEMS:  Review of Systems   Constitutional:  Positive for activity change. Negative for chills and fever.   HENT:  Negative for congestion, postnasal drip, rhinorrhea, sore throat and trouble swallowing.    Respiratory:  Negative for cough, shortness of breath and wheezing.    Cardiovascular:  Negative for chest pain and leg swelling.   Gastrointestinal:  Negative for abdominal pain, blood in stool, constipation, diarrhea, nausea and vomiting.   Genitourinary:  Negative for difficulty urinating, dysuria and hematuria.   Musculoskeletal:  Negative for arthralgias.   Skin:  Negative for wound.   Neurological:  Negative for dizziness, light-headedness and headaches.       VITALS:  Visit Vitals  /62   Pulse 74   Temp 97.4 °F (36.3 °C)   Resp 18   Wt 68.4 kg (150 lb 11.2 oz)   SpO2 96%   BMI 24.32 kg/m²         PAIN SCORE:  Vitals:    11/29/24 1004   PainSc:  0         PHYSICAL ASSESSMENT:  Physical Exam  Constitutional:       General: She is not in acute distress.     Appearance: She is not toxic-appearing or diaphoretic.   HENT:      Head: Normocephalic.      Mouth/Throat:      Mouth: Mucous membranes are moist.   Eyes:      General:         Right eye: No discharge.         Left eye: No discharge.   Cardiovascular:      Rate and Rhythm: Normal rate.   Pulmonary:      Effort: Pulmonary effort is normal. No respiratory distress.      Breath sounds: No wheezing or rales.      Comments: Diminished bilaterally at bases  Abdominal:      General:  Bowel sounds are normal. There is no distension.      Palpations: Abdomen is soft.      Tenderness: There is no abdominal tenderness. There is no guarding or rebound.   Musculoskeletal:      Right lower leg: No edema.      Left lower leg: No edema.      Comments: Moves all extremities   Skin:     General: Skin is warm and dry.   Neurological:      Mental Status: She is alert and oriented to person, place, and time.   Psychiatric:         Mood and Affect: Mood normal.         Behavior: Behavior normal.         LABS:  11/26/2024 chest x-ray 2 view  IMPRESSION     CHEST     :   Heart is within normal limits with cardiac pacemaker and no active infiltrates..      11/26/2024: WBC 13.68 hemoglobin 11.6 platelet 212    11/25/2024: Urinalysis and urine culture  URINALYSIS  Test Within Range Out of Range Ref Range Units  COLOR Yellow Yellow - Dark Yellow  TURBIDITY Clear Clear  SPECIFIC GRAVITY 1.014 1.005 - 1.030  PH 6.0 4.0 - 8.0  PROTEIN, SEMI-QUANT Trace H Negative mg/dL  GLUCOSE, SEMI-QUANT Negative Negative mg/dL  KETONES, SEMI-QUANT Negative Negative mg/dL  BILIRUBIN, SEMI-QUANT Negative Negative  BLOOD, SEMI-QUANT. Negative Negative  NITRITE, SEMI-QUANT Negative Negative  UROBILINOGEN, SEMI-QT 1.0 0.2 - 1.0 EU/dL  LEUKOCYTES, SEMI-QT ModerateH Negative - Trace  URINALYSIS MICROSCOPIC  Test Within Range Out of Range Ref Range Units  RED BLOOD CELLS/HPF 2 None - 5 /HPF  WHITE BLOOD CELLS 10-  25/HPF  H None - 2-5/HPF /HPF  EPITHELIAL CELLS/LPF ModerateH None - Few /LPF  HYALINE CAST Present None seen - Present  BACTERIA None Seen None Seen  Microbiology  CULTURE, URINEURINE COLLECTION CONTAINER Urine Culture Transfer Tube  SECOND PRELIMINARY REPORT NO GROWTH  FINAL CULTURE REPORT - No Growth    11/25/2024 WBC 13.46 hemoglobin 11.4 platelet 200 magnesium 2.3 phosphorus 3.5 BUN 20 creatinine 1.13 sodium 141 potassium 5.0 ALT 12 AST 15 alk phos 96    11/22/2024: Respiratory viral panel  NEGATIVE for ADENOVIRUS  NEGATIVE  for HUMAN METAPNEUMOVIRUS  NEGATIVE for INFLUENZA A  NEGATIVE for INFLUENZA A subtype H1  NEGATIVE for INFLUENZA A subtype H3  NEGATIVE for INFLUENZA B  NEGATIVE for PARAINFLUENZA 1  NEGATIVE for PARAINFLUENZA 2  NEGATIVE for PARAINFLUENZA 3  NEGATIVE for PARAINFLUENZA 4  NEGATIVE for RHINOVIRUS  NEGATIVE for RSV A  NEGATIVE for RSV B  NEGATIVE for B pertussis  NEGATIVE for B parapertussis  bronchiseptica  NEGATIVE for B holmesii          11/25/2024: WBC 13.46 hemoglobin 11.4 platelet 200 magnesium 2.3 phosphorus 3.5 BUN 20 creatinine 1.13 sodium 141 potassium 5.0 ALT 12 AST 15 alk phos 90  11/21/2024: WBC 13.49 hemoglobin 13.3 platelet 239      11/20/2024 chest x-ray 2v  PROCEDURE        CHEST X-RAY         AP and lateral views  See Note  FINDINGS     CHEST     : Heart is within normal limits with atheromatous changes in the dorsal aorta.  Transvenous cardiac pacemaker is noted in position with accentuation of the pulmonary vasculature in both lung bases.  The lung fields are otherwise essentially clear.  IMPRESSION     CHEST     :   Heart is within normal limits with cardiac pacemaker and no active infiltrates.    11/20/2024: WBC 14.51 hemoglobin 14.5 platelets 274 creatinine 1.58 BUN 50 sodium 141 potassium 4.3      ASSESSMENT AND PLAN  Diagnoses and associated orders for this visit:  1. Hypotension, unspecified hypotension type  2. Leukocytosis, unspecified type  3. Chronic heart failure with preserved ejection fraction  (CMD)  4. Atrial fibrillation, unspecified type  (CMD)  5. Chronic obstructive pulmonary disease, unspecified COPD type  (CMD) [J44.9]  6. Debility  7. Status post fall [Z91.81]      UTI?-Urine culture -10,000 - 50,000 CFU/mL Mixed bacterial benjamin with no predominating type , dc cefuroxime,   Repeat UA urine culture 11/25 negative    Orthostatic hypotension?-Improving, torsemide dose decreased, SHELDON saavedrae, monitor    Leukocytosis-WBC 13, patient afebrile, asymptomatic, chest x-ray negative, Urine  culture -10,000 - 50,000 CFU/mL Mixed bacterial benjamin with no predominating type , repeat UA and urine culture 11/25 negative,.,  Chest x-ray negative.  ID consult pending repeat CBC tomorrow    Mild hypernatremia-resolved, encourage oral fluid intake.  Torsemide dose decreased    Status post fall-fall precautions per facility protocol, PT/OT    S/p recent acute on chronic CHF-patient appears slightly hypovolemic on exam, hold torsemide today, then resume, monitor daily weights    COPD-continue nebs and inhalers, patient currently on room air, patient reports using nocturnal O2 at 2 L at baseline    [id number] apixaban and digoxin, rate controlled    CKD stage III-monitor renal function, baseline creatinine 1.5-1.8    Hypermagnesemia -improved.     Debility-PT/OT, fall precautions per facility protocol  11/20/2024:Ambulates 10 feet, 50 feet rolling walker contact-guard assist, transfers contact-guard assist, bed mobility mod assist,  11/25:Ambulates 150 feet rolling walker standby assist, stairs contact-guard to standby assist, bed mobility supervision, transfers standby assist, upper body dressing supervision lower body dressing min assist, toilet hygiene min assist toilet transfers contact-guard assist  11/28:Ambulates 200 feet x 2 rolling walker standby assist transfer supervision, bed mobility mod I,    FOLLOW UP APPOINTMENTS:  No future appointments.  F/u with pcp in 1 week    I attempted to contact patient's granddaughter, Carmela to provide updates, no answer voicemail message left with my contact information and request for callback.    Discussed with: RN / Nursing, Patient, SW/, and PT  Prognosis: fair    Total time spent is 35 minutes. Time spent in:   Discussion of plan of care with the patient/family/staff regarding: Prognosis  Preparing to see the patient (eg, review of tests).  Performing a medically appropriate examination and/or evaluation.  Counseling and educating the  patient/family/caregiver.  Ordering medications, tests, or procedures.  Documenting clinical information in electronic or other health record.  Independently interpreting results (not separately reported) and communicating results to the patient/family/caregiver.  Care Coordination (not separately reported).    Electronically signed by Néstor Montiel CNP    Charting performed using Dragon Dictation and thus may result in grammatical, spelling & documentation inaccuracies.   no

## 2024-12-03 ENCOUNTER — TRANSCRIPTION ENCOUNTER (OUTPATIENT)
Age: 74
End: 2024-12-03

## 2024-12-05 NOTE — PROVIDER CONTACT NOTE (HYPOGLYCEMIA EVENT) - INITIAL JUICE GIVEN AT
19-Nov-2024 17:40

## 2024-12-09 ENCOUNTER — TRANSCRIPTION ENCOUNTER (OUTPATIENT)
Age: 74
End: 2024-12-09

## 2024-12-09 ENCOUNTER — NON-APPOINTMENT (OUTPATIENT)
Age: 74
End: 2024-12-09

## 2024-12-10 ENCOUNTER — OUTPATIENT (OUTPATIENT)
Dept: OUTPATIENT SERVICES | Facility: HOSPITAL | Age: 74
LOS: 1 days | End: 2024-12-10
Payer: MEDICARE

## 2024-12-10 ENCOUNTER — APPOINTMENT (OUTPATIENT)
Dept: INFUSION THERAPY | Facility: CLINIC | Age: 74
End: 2024-12-10

## 2024-12-10 ENCOUNTER — NON-APPOINTMENT (OUTPATIENT)
Age: 74
End: 2024-12-10

## 2024-12-10 ENCOUNTER — APPOINTMENT (OUTPATIENT)
Dept: HEMATOLOGY ONCOLOGY | Facility: CLINIC | Age: 74
End: 2024-12-10
Payer: MEDICARE

## 2024-12-10 VITALS
TEMPERATURE: 97.8 F | WEIGHT: 159 LBS | HEART RATE: 90 BPM | SYSTOLIC BLOOD PRESSURE: 164 MMHG | HEIGHT: 63 IN | RESPIRATION RATE: 18 BRPM | OXYGEN SATURATION: 95 % | BODY MASS INDEX: 28.17 KG/M2 | DIASTOLIC BLOOD PRESSURE: 91 MMHG

## 2024-12-10 VITALS
SYSTOLIC BLOOD PRESSURE: 164 MMHG | TEMPERATURE: 98 F | OXYGEN SATURATION: 95 % | WEIGHT: 158.95 LBS | RESPIRATION RATE: 18 BRPM | HEIGHT: 63 IN | DIASTOLIC BLOOD PRESSURE: 91 MMHG | HEART RATE: 90 BPM

## 2024-12-10 DIAGNOSIS — C34.90 MALIGNANT NEOPLASM OF UNSPECIFIED PART OF UNSPECIFIED BRONCHUS OR LUNG: ICD-10-CM

## 2024-12-10 DIAGNOSIS — Z96.641 PRESENCE OF RIGHT ARTIFICIAL HIP JOINT: Chronic | ICD-10-CM

## 2024-12-10 DIAGNOSIS — C79.31 MALIGNANT NEOPLASM OF UNSPECIFIED PART OF UNSPECIFIED BRONCHUS OR LUNG: ICD-10-CM

## 2024-12-10 DIAGNOSIS — R29.6 REPEATED FALLS: ICD-10-CM

## 2024-12-10 DIAGNOSIS — M06.9 RHEUMATOID ARTHRITIS, UNSPECIFIED: ICD-10-CM

## 2024-12-10 DIAGNOSIS — N18.4 CHRONIC KIDNEY DISEASE, STAGE 4 (SEVERE): ICD-10-CM

## 2024-12-10 LAB
ALBUMIN SERPL ELPH-MCNC: 3.2 G/DL
ALP BLD-CCNC: 92 U/L
ALT SERPL-CCNC: 13 U/L
ANION GAP SERPL CALC-SCNC: 8 MMOL/L
AST SERPL-CCNC: 23 U/L
BILIRUB SERPL-MCNC: 0.5 MG/DL
BUN SERPL-MCNC: 41 MG/DL
CALCIUM SERPL-MCNC: 9.4 MG/DL
CHLORIDE SERPL-SCNC: 109 MMOL/L
CO2 SERPL-SCNC: 28 MMOL/L
CREAT SERPL-MCNC: 1.7 MG/DL
EGFR: 31 ML/MIN/1.73M2
GLUCOSE SERPL-MCNC: 113 MG/DL
HCT VFR BLD CALC: 32.9 %
HGB BLD-MCNC: 10.6 G/DL
LYMPHOCYTES # BLD AUTO: 1.8 K/UL
LYMPHOCYTES NFR BLD AUTO: 22.5 %
MAN DIFF?: NO
MCHC RBC-ENTMCNC: 28.8 PG
MCHC RBC-ENTMCNC: 32.2 G/DL
MCV RBC AUTO: 89.4 FL
NEUTROPHILS # BLD AUTO: 5.2 K/UL
NEUTROPHILS NFR BLD AUTO: 64.1 %
PLATELET # BLD AUTO: 203 K/UL
POTASSIUM SERPL-SCNC: 4.5 MMOL/L
PROT SERPL-MCNC: 7.8 G/DL
RBC # BLD: 3.68 M/UL
RBC # FLD: 13.9 %
SODIUM SERPL-SCNC: 145 MMOL/L
WBC # FLD AUTO: 8.1 K/UL

## 2024-12-10 PROCEDURE — 96413 CHEMO IV INFUSION 1 HR: CPT

## 2024-12-10 PROCEDURE — G2211 COMPLEX E/M VISIT ADD ON: CPT

## 2024-12-10 PROCEDURE — 96375 TX/PRO/DX INJ NEW DRUG ADDON: CPT

## 2024-12-10 PROCEDURE — 99214 OFFICE O/P EST MOD 30 MIN: CPT

## 2024-12-10 RX ORDER — GEMCITABINE 10 MG/ML
1336 INJECTION, SOLUTION INTRAVENOUS ONCE
Refills: 0 | Status: COMPLETED | OUTPATIENT
Start: 2024-12-10 | End: 2024-12-10

## 2024-12-10 RX ORDER — PALONOSETRON HYDROCHLORIDE 0.25 MG/5ML
0.25 INJECTION INTRAVENOUS ONCE
Refills: 0 | Status: COMPLETED | OUTPATIENT
Start: 2024-12-10 | End: 2024-12-10

## 2024-12-10 RX ADMIN — GEMCITABINE 1336 MILLIGRAM(S): 10 INJECTION, SOLUTION INTRAVENOUS at 15:23

## 2024-12-10 RX ADMIN — PALONOSETRON HYDROCHLORIDE 0.25 MILLIGRAM(S): 0.25 INJECTION INTRAVENOUS at 15:19

## 2024-12-10 NOTE — CHART NOTE - NSREFPHYEXINPTDOCREFER_GEN_ALL_CORE
Clinical documentation
Discharge plans

## 2024-12-10 NOTE — CHART NOTE - NSCHARTNOTEFT_GEN_A_CORE
On admission patient had no evidence of encephalopathy:  She has baseline dementia and also has mets of Lung Cancer; On admission patient had no evidence of encephalopathy:  She has baseline dementia and also has mets to her brain from Lung Cancer;

## 2024-12-11 LAB — TSH SERPL-ACNC: 1.3 UIU/ML

## 2024-12-16 ENCOUNTER — TRANSCRIPTION ENCOUNTER (OUTPATIENT)
Age: 74
End: 2024-12-16

## 2024-12-20 ENCOUNTER — TRANSCRIPTION ENCOUNTER (OUTPATIENT)
Age: 74
End: 2024-12-20

## 2024-12-30 ENCOUNTER — APPOINTMENT (OUTPATIENT)
Dept: HOME HEALTH SERVICES | Facility: HOME HEALTH | Age: 74
End: 2024-12-30
Payer: MEDICARE

## 2024-12-30 VITALS
OXYGEN SATURATION: 96 % | SYSTOLIC BLOOD PRESSURE: 128 MMHG | DIASTOLIC BLOOD PRESSURE: 76 MMHG | TEMPERATURE: 97.7 F | HEART RATE: 87 BPM

## 2024-12-30 DIAGNOSIS — Z96.649 DISLOCATION OF OTHER INTERNAL JOINT PROSTHESIS, INITIAL ENCOUNTER: ICD-10-CM

## 2024-12-30 DIAGNOSIS — C79.31 MALIGNANT NEOPLASM OF UNSPECIFIED PART OF UNSPECIFIED BRONCHUS OR LUNG: ICD-10-CM

## 2024-12-30 DIAGNOSIS — R68.89 OTHER GENERAL SYMPTOMS AND SIGNS: ICD-10-CM

## 2024-12-30 DIAGNOSIS — E11.65 TYPE 2 DIABETES MELLITUS WITH HYPERGLYCEMIA: ICD-10-CM

## 2024-12-30 DIAGNOSIS — C34.90 MALIGNANT NEOPLASM OF UNSPECIFIED PART OF UNSPECIFIED BRONCHUS OR LUNG: ICD-10-CM

## 2024-12-30 DIAGNOSIS — I82.409 ACUTE EMBOLISM AND THROMBOSIS OF UNSPECIFIED DEEP VEINS OF UNSPECIFIED LOWER EXTREMITY: ICD-10-CM

## 2024-12-30 DIAGNOSIS — N18.4 CHRONIC KIDNEY DISEASE, STAGE 4 (SEVERE): ICD-10-CM

## 2024-12-30 DIAGNOSIS — R05.1 ACUTE COUGH: ICD-10-CM

## 2024-12-30 DIAGNOSIS — J34.89 OTHER SPECIFIED DISORDERS OF NOSE AND NASAL SINUSES: ICD-10-CM

## 2024-12-30 DIAGNOSIS — Z79.4 TYPE 2 DIABETES MELLITUS WITH HYPERGLYCEMIA: ICD-10-CM

## 2024-12-30 DIAGNOSIS — T84.028A DISLOCATION OF OTHER INTERNAL JOINT PROSTHESIS, INITIAL ENCOUNTER: ICD-10-CM

## 2024-12-30 DIAGNOSIS — M06.9 RHEUMATOID ARTHRITIS, UNSPECIFIED: ICD-10-CM

## 2024-12-30 DIAGNOSIS — S02.401A MAXILLARY FRACTURE, UNSPECIFIED SIDE, INITIAL ENCOUNTER FOR CLOSED FRACTURE: ICD-10-CM

## 2024-12-30 PROCEDURE — 99495 TRANSJ CARE MGMT MOD F2F 14D: CPT

## 2024-12-30 RX ORDER — AMLODIPINE BESYLATE 10 MG/1
10 TABLET ORAL DAILY
Qty: 90 | Refills: 3 | Status: ACTIVE | COMMUNITY
Start: 2024-12-30

## 2024-12-30 RX ORDER — INSULIN LISPRO 100 [IU]/ML
100 INJECTION, SOLUTION INTRAVENOUS; SUBCUTANEOUS 3 TIMES DAILY
Qty: 30 | Refills: 3 | Status: ACTIVE | COMMUNITY
Start: 2024-12-30

## 2024-12-31 ENCOUNTER — LABORATORY RESULT (OUTPATIENT)
Age: 74
End: 2024-12-31

## 2025-01-07 NOTE — PROGRESS NOTE ADULT - TIME BILLING
Patient seen and examined;  Stable   Will go to [facility]  No change in current medication Pt called asking to speak with clinical team in regards to a PA for her medication

## 2025-01-13 ENCOUNTER — INPATIENT (INPATIENT)
Facility: HOSPITAL | Age: 75
LOS: 3 days | Discharge: NURSING, REHAB OR LONG-TERM CARE | End: 2025-01-17
Attending: STUDENT IN AN ORGANIZED HEALTH CARE EDUCATION/TRAINING PROGRAM | Admitting: INTERNAL MEDICINE
Payer: MEDICARE

## 2025-01-13 VITALS
WEIGHT: 119.93 LBS | TEMPERATURE: 99 F | DIASTOLIC BLOOD PRESSURE: 95 MMHG | RESPIRATION RATE: 18 BRPM | HEIGHT: 63 IN | HEART RATE: 92 BPM | SYSTOLIC BLOOD PRESSURE: 205 MMHG | OXYGEN SATURATION: 91 %

## 2025-01-13 DIAGNOSIS — Z96.641 PRESENCE OF RIGHT ARTIFICIAL HIP JOINT: Chronic | ICD-10-CM

## 2025-01-13 LAB
ANION GAP SERPL CALC-SCNC: 11 MMOL/L — SIGNIFICANT CHANGE UP (ref 5–17)
ANION GAP SERPL CALC-SCNC: 14 MMOL/L — SIGNIFICANT CHANGE UP (ref 5–17)
ANION GAP SERPL CALC-SCNC: 15 MMOL/L — SIGNIFICANT CHANGE UP (ref 5–17)
APPEARANCE UR: ABNORMAL
APTT BLD: 27 SEC — SIGNIFICANT CHANGE UP (ref 24.5–35.6)
APTT BLD: 28.9 SEC — SIGNIFICANT CHANGE UP (ref 24.5–35.6)
BASOPHILS # BLD AUTO: 0 K/UL — SIGNIFICANT CHANGE UP (ref 0–0.2)
BASOPHILS NFR BLD AUTO: 0 % — SIGNIFICANT CHANGE UP (ref 0–2)
BILIRUB UR-MCNC: NEGATIVE — SIGNIFICANT CHANGE UP
BLD GP AB SCN SERPL QL: NEGATIVE — SIGNIFICANT CHANGE UP
BUN SERPL-MCNC: 27 MG/DL — HIGH (ref 7–23)
BUN SERPL-MCNC: 27 MG/DL — HIGH (ref 7–23)
BUN SERPL-MCNC: 28 MG/DL — HIGH (ref 7–23)
CALCIUM SERPL-MCNC: 9 MG/DL — SIGNIFICANT CHANGE UP (ref 8.4–10.5)
CALCIUM SERPL-MCNC: 9.4 MG/DL — SIGNIFICANT CHANGE UP (ref 8.4–10.5)
CALCIUM SERPL-MCNC: 9.9 MG/DL — SIGNIFICANT CHANGE UP (ref 8.4–10.5)
CHLORIDE SERPL-SCNC: 100 MMOL/L — SIGNIFICANT CHANGE UP (ref 96–108)
CHLORIDE SERPL-SCNC: 102 MMOL/L — SIGNIFICANT CHANGE UP (ref 96–108)
CHLORIDE SERPL-SCNC: 103 MMOL/L — SIGNIFICANT CHANGE UP (ref 96–108)
CO2 SERPL-SCNC: 24 MMOL/L — SIGNIFICANT CHANGE UP (ref 22–31)
CO2 SERPL-SCNC: 26 MMOL/L — SIGNIFICANT CHANGE UP (ref 22–31)
CO2 SERPL-SCNC: 26 MMOL/L — SIGNIFICANT CHANGE UP (ref 22–31)
COLOR SPEC: YELLOW — SIGNIFICANT CHANGE UP
CREAT SERPL-MCNC: 2.07 MG/DL — HIGH (ref 0.5–1.3)
CREAT SERPL-MCNC: 2.21 MG/DL — HIGH (ref 0.5–1.3)
CREAT SERPL-MCNC: 2.24 MG/DL — HIGH (ref 0.5–1.3)
DIFF PNL FLD: ABNORMAL
EGFR: 22 ML/MIN/1.73M2 — LOW
EGFR: 23 ML/MIN/1.73M2 — LOW
EGFR: 25 ML/MIN/1.73M2 — LOW
EOSINOPHIL # BLD AUTO: 0 K/UL — SIGNIFICANT CHANGE UP (ref 0–0.5)
EOSINOPHIL NFR BLD AUTO: 0 % — SIGNIFICANT CHANGE UP (ref 0–6)
FLUAV AG NPH QL: SIGNIFICANT CHANGE UP
FLUBV AG NPH QL: SIGNIFICANT CHANGE UP
GAS PNL BLDV: SIGNIFICANT CHANGE UP
GLUCOSE SERPL-MCNC: 218 MG/DL — HIGH (ref 70–99)
GLUCOSE SERPL-MCNC: 218 MG/DL — HIGH (ref 70–99)
GLUCOSE SERPL-MCNC: 220 MG/DL — HIGH (ref 70–99)
GLUCOSE UR QL: NEGATIVE MG/DL — SIGNIFICANT CHANGE UP
HCT VFR BLD CALC: 38.3 % — SIGNIFICANT CHANGE UP (ref 34.5–45)
HGB BLD-MCNC: 12.2 G/DL — SIGNIFICANT CHANGE UP (ref 11.5–15.5)
INR BLD: 1.11 — SIGNIFICANT CHANGE UP (ref 0.85–1.16)
INR BLD: 1.19 — HIGH (ref 0.85–1.16)
KETONES UR-MCNC: NEGATIVE MG/DL — SIGNIFICANT CHANGE UP
LACTATE SERPL-SCNC: 1.2 MMOL/L — SIGNIFICANT CHANGE UP (ref 0.5–2)
LEUKOCYTE ESTERASE UR-ACNC: ABNORMAL
LYMPHOCYTES # BLD AUTO: 1.21 K/UL — SIGNIFICANT CHANGE UP (ref 1–3.3)
LYMPHOCYTES # BLD AUTO: 5.2 % — LOW (ref 13–44)
MAGNESIUM SERPL-MCNC: 1.5 MG/DL — LOW (ref 1.6–2.6)
MCHC RBC-ENTMCNC: 27.6 PG — SIGNIFICANT CHANGE UP (ref 27–34)
MCHC RBC-ENTMCNC: 31.9 G/DL — LOW (ref 32–36)
MCV RBC AUTO: 86.7 FL — SIGNIFICANT CHANGE UP (ref 80–100)
MONOCYTES # BLD AUTO: 2.87 K/UL — HIGH (ref 0–0.9)
MONOCYTES NFR BLD AUTO: 12.3 % — SIGNIFICANT CHANGE UP (ref 2–14)
NEUTROPHILS # BLD AUTO: 19.25 K/UL — HIGH (ref 1.8–7.4)
NEUTROPHILS NFR BLD AUTO: 81.6 % — HIGH (ref 43–77)
NITRITE UR-MCNC: NEGATIVE — SIGNIFICANT CHANGE UP
PH UR: 6 — SIGNIFICANT CHANGE UP (ref 5–8)
PLATELET # BLD AUTO: 190 K/UL — SIGNIFICANT CHANGE UP (ref 150–400)
POTASSIUM SERPL-MCNC: 3.4 MMOL/L — LOW (ref 3.5–5.3)
POTASSIUM SERPL-MCNC: SIGNIFICANT CHANGE UP (ref 3.5–5.3)
POTASSIUM SERPL-MCNC: SIGNIFICANT CHANGE UP (ref 3.5–5.3)
POTASSIUM SERPL-SCNC: 3.4 MMOL/L — LOW (ref 3.5–5.3)
POTASSIUM SERPL-SCNC: SIGNIFICANT CHANGE UP (ref 3.5–5.3)
POTASSIUM SERPL-SCNC: SIGNIFICANT CHANGE UP (ref 3.5–5.3)
PROT UR-MCNC: 300 MG/DL
PROTHROM AB SERPL-ACNC: 13 SEC — SIGNIFICANT CHANGE UP (ref 9.9–13.4)
PROTHROM AB SERPL-ACNC: 13.9 SEC — HIGH (ref 9.9–13.4)
RBC # BLD: 4.42 M/UL — SIGNIFICANT CHANGE UP (ref 3.8–5.2)
RBC # FLD: 14.1 % — SIGNIFICANT CHANGE UP (ref 10.3–14.5)
RH IG SCN BLD-IMP: POSITIVE — SIGNIFICANT CHANGE UP
RSV RNA NPH QL NAA+NON-PROBE: SIGNIFICANT CHANGE UP
SARS-COV-2 RNA SPEC QL NAA+PROBE: SIGNIFICANT CHANGE UP
SODIUM SERPL-SCNC: 140 MMOL/L — SIGNIFICANT CHANGE UP (ref 135–145)
SODIUM SERPL-SCNC: 140 MMOL/L — SIGNIFICANT CHANGE UP (ref 135–145)
SODIUM SERPL-SCNC: 141 MMOL/L — SIGNIFICANT CHANGE UP (ref 135–145)
SP GR SPEC: 1.01 — SIGNIFICANT CHANGE UP (ref 1–1.03)
TROPONIN T, HIGH SENSITIVITY RESULT: 101 NG/L — CRITICAL HIGH (ref 0–51)
UROBILINOGEN FLD QL: 0.2 MG/DL — SIGNIFICANT CHANGE UP (ref 0.2–1)
WBC # BLD: 23.33 K/UL — HIGH (ref 3.8–10.5)
WBC # FLD AUTO: 23.33 K/UL — HIGH (ref 3.8–10.5)

## 2025-01-13 PROCEDURE — 71045 X-RAY EXAM CHEST 1 VIEW: CPT | Mod: 26

## 2025-01-13 PROCEDURE — 70450 CT HEAD/BRAIN W/O DYE: CPT | Mod: 26

## 2025-01-13 PROCEDURE — 73501 X-RAY EXAM HIP UNI 1 VIEW: CPT | Mod: 26,RT,XE

## 2025-01-13 PROCEDURE — 99285 EMERGENCY DEPT VISIT HI MDM: CPT | Mod: FS

## 2025-01-13 PROCEDURE — 27265 TREAT HIP DISLOCATION: CPT | Mod: RT

## 2025-01-13 PROCEDURE — 99222 1ST HOSP IP/OBS MODERATE 55: CPT | Mod: GC

## 2025-01-13 PROCEDURE — 73502 X-RAY EXAM HIP UNI 2-3 VIEWS: CPT | Mod: 26,RT

## 2025-01-13 RX ORDER — ONDANSETRON 4 MG/1
4 TABLET ORAL EVERY 8 HOURS
Refills: 0 | Status: DISCONTINUED | OUTPATIENT
Start: 2025-01-13 | End: 2025-01-17

## 2025-01-13 RX ORDER — INSULIN LISPRO 100/ML
VIAL (ML) SUBCUTANEOUS
Refills: 0 | Status: DISCONTINUED | OUTPATIENT
Start: 2025-01-13 | End: 2025-01-17

## 2025-01-13 RX ORDER — DEXTROSE MONOHYDRATE 25 G/50ML
12.5 INJECTION, SOLUTION INTRAVENOUS ONCE
Refills: 0 | Status: DISCONTINUED | OUTPATIENT
Start: 2025-01-13 | End: 2025-01-17

## 2025-01-13 RX ORDER — ACETAMINOPHEN 80 MG/.8ML
650 SOLUTION/ DROPS ORAL EVERY 6 HOURS
Refills: 0 | Status: DISCONTINUED | OUTPATIENT
Start: 2025-01-13 | End: 2025-01-17

## 2025-01-13 RX ORDER — ACETAMINOPHEN 80 MG/.8ML
1000 SOLUTION/ DROPS ORAL ONCE
Refills: 0 | Status: COMPLETED | OUTPATIENT
Start: 2025-01-13 | End: 2025-01-13

## 2025-01-13 RX ORDER — METOPROLOL TARTRATE 50 MG
5 TABLET ORAL ONCE
Refills: 0 | Status: DISCONTINUED | OUTPATIENT
Start: 2025-01-13 | End: 2025-01-13

## 2025-01-13 RX ORDER — SODIUM CHLORIDE 9 MG/ML
1000 INJECTION, SOLUTION INTRAVENOUS
Refills: 0 | Status: DISCONTINUED | OUTPATIENT
Start: 2025-01-13 | End: 2025-01-17

## 2025-01-13 RX ORDER — DEXTROSE MONOHYDRATE 25 G/50ML
15 INJECTION, SOLUTION INTRAVENOUS ONCE
Refills: 0 | Status: DISCONTINUED | OUTPATIENT
Start: 2025-01-13 | End: 2025-01-17

## 2025-01-13 RX ORDER — LORAZEPAM 1 MG/1
1 TABLET ORAL ONCE
Refills: 0 | Status: DISCONTINUED | OUTPATIENT
Start: 2025-01-13 | End: 2025-01-13

## 2025-01-13 RX ORDER — SODIUM CHLORIDE 9 MG/ML
1000 INJECTION, SOLUTION INTRAMUSCULAR; INTRAVENOUS; SUBCUTANEOUS ONCE
Refills: 0 | Status: COMPLETED | OUTPATIENT
Start: 2025-01-13 | End: 2025-01-13

## 2025-01-13 RX ORDER — CEFTRIAXONE SODIUM 1 G/1
1000 INJECTION, POWDER, FOR SOLUTION INTRAMUSCULAR; INTRAVENOUS ONCE
Refills: 0 | Status: COMPLETED | OUTPATIENT
Start: 2025-01-13 | End: 2025-01-13

## 2025-01-13 RX ORDER — METOPROLOL TARTRATE 50 MG
5 TABLET ORAL ONCE
Refills: 0 | Status: COMPLETED | OUTPATIENT
Start: 2025-01-13 | End: 2025-01-13

## 2025-01-13 RX ORDER — DEXTROSE MONOHYDRATE 25 G/50ML
25 INJECTION, SOLUTION INTRAVENOUS ONCE
Refills: 0 | Status: DISCONTINUED | OUTPATIENT
Start: 2025-01-13 | End: 2025-01-17

## 2025-01-13 RX ORDER — MAG HYDROX/ALUMINUM HYD/SIMETH 200-200-20
30 SUSPENSION, ORAL (FINAL DOSE FORM) ORAL EVERY 4 HOURS
Refills: 0 | Status: DISCONTINUED | OUTPATIENT
Start: 2025-01-13 | End: 2025-01-14

## 2025-01-13 RX ORDER — GLUCAGON INJECTION, SOLUTION 0.5 MG/.1ML
1 INJECTION, SOLUTION SUBCUTANEOUS ONCE
Refills: 0 | Status: DISCONTINUED | OUTPATIENT
Start: 2025-01-13 | End: 2025-01-17

## 2025-01-13 RX ORDER — NIFEDIPINE 60 MG/1
30 TABLET, EXTENDED RELEASE ORAL ONCE
Refills: 0 | Status: COMPLETED | OUTPATIENT
Start: 2025-01-13 | End: 2025-01-13

## 2025-01-13 RX ORDER — GINKGO BILOBA 40 MG
3 CAPSULE ORAL AT BEDTIME
Refills: 0 | Status: DISCONTINUED | OUTPATIENT
Start: 2025-01-13 | End: 2025-01-14

## 2025-01-13 RX ADMIN — SODIUM CHLORIDE 1000 MILLILITER(S): 9 INJECTION, SOLUTION INTRAMUSCULAR; INTRAVENOUS; SUBCUTANEOUS at 17:57

## 2025-01-13 RX ADMIN — Medication 5 MILLIGRAM(S): at 17:57

## 2025-01-13 RX ADMIN — Medication 2: at 21:22

## 2025-01-13 RX ADMIN — ACETAMINOPHEN 400 MILLIGRAM(S): 80 SOLUTION/ DROPS ORAL at 16:46

## 2025-01-13 RX ADMIN — CEFTRIAXONE SODIUM 100 MILLIGRAM(S): 1 INJECTION, POWDER, FOR SOLUTION INTRAMUSCULAR; INTRAVENOUS at 16:47

## 2025-01-13 RX ADMIN — ACETAMINOPHEN 1000 MILLIGRAM(S): 80 SOLUTION/ DROPS ORAL at 21:07

## 2025-01-13 RX ADMIN — LORAZEPAM 1 MILLIGRAM(S): 1 TABLET ORAL at 15:50

## 2025-01-13 NOTE — ED PROVIDER NOTE - PHYSICAL EXAMINATION
Vitals reviewed  Gen: confused and agitated during eval but noted to be sleeping comfortably prior to waking up, no hypoxia or dyspnea  Skin: wwp, no rash/lesions  HEENT: ncat, eomi, mmm  Neck/Back: no midline ttp/step off, no paraspinal ttp  CV: +s1/2, no audible m/r/g  Chest: no chest wall ttp   Resp: symmetrical expansion, b/l breath sounds, no w/r  Abd: nondistended, soft, nontender, no r/g  Ext:  lying on R side refusing to roll over but able to move all ext, distal pulses intact, no joint ttp  Neuro: alert/oriented to self only, no focal deficits

## 2025-01-13 NOTE — H&P ADULT - ASSESSMENT
74F with PMH of lung cancer (w/ mets to brain), CKD, HLD, DM2, RA BIBEMS for AMS and unwitnessed fall, found to be hypertensive to 190s-200s in ED.    NEURO:  #AMS  #Lung Ca with Brain Mets    CARDS:    PULM:    HEME:    GI:    RENAL:    ID:    ENDO:    MISC.   74F with PMH of lung cancer (w/ mets to brain), CKD, HLD, DM2, RA BIBEMS for AMS and unwitnessed fall, found to be hypertensive to 190s-200s in ED.    NEURO:  #AMS  #Lung Ca with Brain Mets  #Metabolic encephalopathy  2/2 infection vs. HTN vs trauma vs progression of mets  - consider Hemeonc consult in AM  - mgmt of HTN as below  - mgmt of infection as below  - delirium and fall precautions  - required lorazepam 1mg in ED, can give haldol vs zyprexa while pt unable to tolerate PO if more agitation not redirectable    CARDS:  #Hypertensive Urgency/Emergency  SBP 200s  Goal SBP: 150 by 01/14 5PM  - Holding home lisinopril and nifedipine for inability to tolerate PO  - s/p lopressor 5mg IVP in ED  - SBP 170s on arrival to [facility], MAP 120s. give hydral 2.5mg IVP now and repeat BP    #HLD  Home med atorvastatin 40mg qd  - restart pending S&S recs    PULM:  MARY. on RA and clear lungs.   - given AMS and possible dysphagia, low threshold to start PNA coverage     GI:  #r/o dysphagia  #LUQ Abdominal TTP  Patient too altered to receive PO meds in ED. Also found down. Has no known hx of dysphagia otherwise. Abdomen on admission with TTP in LUQ.   - S&S assessment  - NPO until then  - obtain abdominal XR to assess stool burden and gross abnormalities, if hgb drops given fall hx assess for splenic vs RP bleed.     Renal/:  #UTI  Hx of recurrent GNR UTIs with resistant profiles. Now with leukocytosis  - ampicilin 500mg q6  - CTX 1g qd  - f/u UCx and sensitivities  - trend WBC    #DUNIA on CKD  Initial Cr 2.21 (presumed baseline: 1.18-1.32 (08-10/2024))  - F/u uLytes  - q6h bladder scans    #Hypomagnesemia  #Hypokalemia  Mg 1.2, K 3.4, likely 2/2 poor PO intake  - replete PRN  - nutrition consult    ENDO:  #IDDM  - mISS    HEME/ONC:  #NSCLC - BMs s/p SRS, liver met.  single agent gemcitabine with therapy, given on day 1 and day 8 of each 21-day cycle   - Heme/onc consult in AM    ID:  #Leukocytosis  2/2 chemo vs. infection (+RSV 01/02)  - F/u RVP   - F/u uLegionella, uStrep, MRSA swab  - F/u BCx x2  - F/u UCx    PPX:  F: s/p 1L NS in ED  E: replete Mg >2, Phos > 3, K > 4  N: NPO, pending dysphagia screening  GI: --  DVT:   Dispo: 7LA 74F with PMH of lung cancer (w/ mets to brain), CKD, HLD, DM2, RA BIBEMS for AMS and unwitnessed fall, found to be hypertensive to 190s-200s in ED.    NEURO:  #AMS  #Lung Ca with Brain Mets  #Metabolic encephalopathy  2/2 infection vs. HTN vs trauma vs progression of mets  - consider Hemeonc consult in AM  - mgmt of HTN as below  - mgmt of infection as below  - delirium and fall precautions  - required lorazepam 1mg in ED, can give haldol vs zyprexa while pt unable to tolerate PO if more agitation not redirectable    CARDS:  #Hypertensive Urgency/Emergency  SBP 200s  Goal SBP: 170s overnight, 150 by 01/14 5PM  - Holding home lisinopril and nifedipine for inability to tolerate PO  - s/p lopressor 5mg IVP in ED  - SBP 170s on arrival to tele, MAP 120s. give hydral 2.5mg IVP now and repeat BP   --- given hydral 10 IVP when SBP increased to 220s overnight    #HLD  Home med atorvastatin 40mg qd. On exam, patient LLE seen to be cool to touch, pulse palpable. Possibly 2/2 vascular disease.   - restart pending S&S recs  - obtain Dopplers b/l    PULM:  #Hypoxia  91% on RA on admission, now satting at 96-97% on RA, seen to be breathing comfortably.   MARY. on RA and clear lungs.   - given AMS and possible dysphagia, low threshold to start PNA coverage   - f/u full RVP    GI:  #r/o dysphagia  #LUQ Abdominal TTP  Patient too altered to receive PO meds in ED. Also found down. Has no known hx of dysphagia otherwise. Abdomen on admission with TTP in LUQ.   - pending bedside dysphagia screening  - S&S assessment  - NPO until then  - pending official abd xr read  --- appears to have stool burden   --- start suppository as pt can't tolerate PO    Renal/:  #UTI  Hx of recurrent GNR UTIs with resistant profiles. Now with leukocytosis  - ampicillin 500mg q6  - CTX 1g qd  - f/u UCx and sensitivities  - trend WBC    #DUNIA on CKD  Initial Cr 2.21 (presumed baseline: 1.18-1.32 (08-10/2024)  - f/u uLytes  - q6h bladder scans  - give LR bolus 1L     #Hypomagnesemia  #Hypokalemia  Mg 1.2, K 3.4, likely 2/2 poor PO intake  - replete Mg  - do not replete K iso DUNIA on CKD  - nutrition consult    ENDO:  #IDDM  - mISS    HEME/ONC:  #NSCLC - BMs s/p SRS, liver met.  single agent gemcitabine with therapy, given on day 1 and day 8 of each 21-day cycle   - Heme/onc consult in AM    ID:  #Leukocytosis  2/2 chemo vs. infection (+RSV 01/02)  - F/u RVP   - F/u uLegionella, uStrep, MRSA swab  - F/u BCx x2  - F/u UCx    #UTI  - treatment as above    MSK:  #Hip dislocation  XR pelvis: Dislocation of the patient's right total hip arthroplasty with osseous. The change at the lateral aspect of the iliac bone and acetabulum likely chronic in nature. The left hip is normal in appearance. Calcified uterine fibroid. Degenerative changes of the lumbar spine. Vascular calcification. No fractures.  Ortho consulted in ED, recs as follows:   - Closed reduction performed in ED, confirmed by post-reduction XRs   - KI and Abduction pillow  - No surgical intervention indicated  - Posterior hip precautions  - Pain control  - Recommend PT eval once mental status improves  - Possible rehab placement, pending PT eval  - Possible abduction orthosis fitting    PPX:  F: currently receiving 1L LR, s/p 1L NS in ED  E: replete Mg > 2  N: NPO, pending dysphagia screening  GI: --  DVT: --  Dispo: 7LA 74F with PMH of lung cancer (w/ mets to brain), CKD, HLD, DM2, RA BIBEMS for AMS and unwitnessed fall, found to be hypertensive to 190s-200s in ED.    NEURO:  #AMS  #Lung Ca with Brain Mets  #Metabolic encephalopathy  2/2 infection vs. HTN vs trauma vs progression of mets  - consider Hemeonc consult in AM  - mgmt of HTN as below  - mgmt of infection as below  - delirium and fall precautions  - required lorazepam 1mg in ED, can give haldol vs zyprexa while pt unable to tolerate PO if more agitation not redirectable    CARDS:  #Hypertensive Urgency/Emergency  SBP 200s  Goal SBP: 170s overnight, 150 by 01/14 5PM  - Holding home lisinopril and nifedipine for inability to tolerate PO  - s/p lopressor 5mg IVP in ED  - SBP 170s on arrival to tele, MAP 120s. give hydral 2.5mg IVP now and repeat BP   --- given hydral 10 IVP when SBP increased to 220s overnight    #HLD  Home med atorvastatin 40mg qd. On exam, patient LLE seen to be cool to touch, pulse palpable. Possibly 2/2 vascular disease.   - restart pending S&S recs  - obtain Dopplers b/l    PULM:  #Hypoxia  91% on RA on admission, now satting at 96-97% on RA, seen to be breathing comfortably.   MARY. on RA and clear lungs.   - given AMS and possible dysphagia, low threshold to start PNA coverage   - f/u full RVP    GI:  #r/o dysphagia  #LUQ Abdominal TTP  Patient too altered to receive PO meds in ED. Also found down. Has no known hx of dysphagia otherwise. Abdomen on admission with TTP in LUQ.   - pending bedside dysphagia screening  - S&S assessment  - NPO until then  - pending official abd xr read  --- appears to have stool burden   --- start suppository as pt can't tolerate PO    Renal/:  #UTI  U/A: turbid, spec grav 1.015, Protein 300, Nitrite negative, Large LE, pH 6.0, , RBC 5, Many bacteria  Hx of recurrent GNR UTIs with resistant profiles. Has had VRE sensitive to ampicillin (10/2024) in the past as well Klebsiella sensitive to CTX (07/2024), Klebsiella sensitive only to carbapenems (12/2023). Now with leukocytosis and + u/a.   - ampicillin 500mg q6  - CTX 1g qd  - f/u UCx and sensitivities  - trend WBC    #DUNIA on CKD  Initial Cr 2.21 (presumed baseline: 1.18-1.32 (08-10/2024)  - f/u uLytes  - q6h bladder scans  - give LR bolus 1L     #Hypomagnesemia  #Hypokalemia  Mg 1.2, K 3.4, likely 2/2 poor PO intake  - replete Mg  - do not replete K iso DUNIA on CKD  - nutrition consult    ENDO:  #IDDM  - mISS    HEME/ONC:  #NSCLC - BMs s/p SRS, liver met.  single agent gemcitabine with therapy, given on day 1 and day 8 of each 21-day cycle   - Heme/onc consult in AM    ID:  #Leukocytosis  2/2 chemo vs. infection (+RSV 01/02)  - F/u RVP   - F/u uLegionella, uStrep, MRSA swab  - F/u BCx x2  - F/u UCx    #UTI  - treatment as above    MSK:  #Hip dislocation  XR pelvis: Dislocation of the patient's right total hip arthroplasty with osseous. The change at the lateral aspect of the iliac bone and acetabulum likely chronic in nature. The left hip is normal in appearance. Calcified uterine fibroid. Degenerative changes of the lumbar spine. Vascular calcification. No fractures.  Ortho consulted in ED, recs as follows:   - Closed reduction performed in ED, confirmed by post-reduction XRs   - KI and Abduction pillow  - No surgical intervention indicated  - Posterior hip precautions  - Recommend PT eval once mental status improves  - Possible rehab placement, pending PT eval  - Possible abduction orthosis fitting    PPX:  F: currently receiving 1L LR, s/p 1L NS in ED  E: replete Mg > 2  N: NPO, pending dysphagia screening  GI: --  DVT: --  Dispo: 7LA 74F with PMH of lung cancer (w/ mets to brain), CKD, HLD, DM2, RA (follows with Dr. Johnston), RLE DVT (off Eliquis d/t frequent falls), prior hospitalization for confusion, BIBEMS for AMS and unwitnessed fall, found to be hypertensive to 190s-200s in ED.    NEURO:  #AMS  #Lung Ca with Brain Mets  #Metabolic encephalopathy  2/2 infection vs. HTN vs trauma vs progression of mets  CTH in ED w/o acute abnormality.   Has presented prior with confusion, was discharged to [facility] and [facility] 3hrs/day x 7 days per wk starter per chart review.   - consider Hemeonc consult in AM  - mgmt of HTN as below  - mgmt of infection as below  - delirium and fall precautions  - required lorazepam 1mg in ED, can give haldol vs zyprexa while pt unable to tolerate PO if more agitation not redirectable    CARDS:  #Hypertensive Urgency/Emergency  #HTN  SBP 200s  Goal SBP: 170s overnight, 150 by 01/14 5PM  Home meds per surescripts: lisinopril 10mg qd, nifedipine er 60mg qd. Per outpatient chart review Dec 2024, was meant to stop nifedipine & lisinopril and start amlodipine 10mg; does not appear patient did so.  - Holding home lisinopril and amlodipine/nifedipine for inability to tolerate PO  - s/p lopressor 5mg IVP in ED  - SBP 170s on arrival to [facility], MAP 120s.   --- given hydral 10 + 2.5 IVP when SBP increased to 220s overnight  - start hydralazine 5mg q6 PRN for SBP >160    #HLD  Home med atorvastatin 40mg qd. On exam, patient LLE seen to be cool to touch, pulse palpable. Possibly 2/2 vascular disease.   - restart pending S&S recs  - obtain Dopplers b/l    PULM:  #Hypoxia  91% on RA on admission, now satting at 96-97% on RA, seen to be breathing comfortably, with CTABL and CXR overall clear.  - consider POCUS to r/o effusion/infiltrate in LLL  - given AMS and possible dysphagia, low threshold to start PNA coverage   - f/u full RVP    GI:  #r/o dysphagia  #LUQ Abdominal TTP  Patient too altered to receive PO meds in ED. Also found down. Has no known hx of dysphagia otherwise. Abdomen on admission with TTP in LUQ.   - pending bedside dysphagia screening  - S&S assessment  - NPO until then  - pending official abd xr read  --- appears to have stool burden   --- start suppository as pt can't tolerate PO    Renal/:  #UTI  U/A: turbid, spec grav 1.015, Protein 300, Nitrite negative, Large LE, pH 6.0, , RBC 5, Many bacteria  Hx of recurrent GNR UTIs with MDR. Has had VRE sensitive to ampicillin (10/2024) in the past as well Klebsiella sensitive to CTX (07/2024), Klebsiella sensitive only to carbapenems (12/2023). Now with leukocytosis and + u/a.   - ampicillin 500mg q6  - CTX 1g qd  - f/u UCx and sensitivities  - trend WBC    #DUNIA on CKD  Initial Cr 2.21 (presumed baseline: 1.18-1.32 (08-10/2024)  - f/u uLytes  - q6h bladder scans  - give LR bolus 1L     #Hypomagnesemia  #Hypokalemia  Mg 1.2, K 3.4, likely 2/2 poor PO intake  - replete Mg  - do not replete K iso DUNIA on CKD  - nutrition consult    ENDO:  #IDDM  - mISS    HEME/ONC:  #NSCLC - BMs s/p SRS, liver met.  Follows closely with Dr. Delaney, had appointment 12/10/24 and received gemcitabine. Per chart review: single agent gemcitabine with therapy, given on day 1 and day 8 of each 21-day cycle. "patient is on therapy with gemcitabine requiring intensive monitoring for toxicity such as cytopenias with CBC, CMP Q 2-3 wks"  - Heme/onc consult in AM    #Hx of RLE DVT  Per chart review, patient with hx of RLE DVT and has had swelling of BL LE. Eliquis was discontinued given frequent falls.  - f/u BL LE doppler      ID:  #Leukocytosis  2/2 chemo vs. infection (+RSV 01/02)  - F/u RVP   - F/u uLegionella, uStrep, MRSA swab  - F/u BCx x2  - F/u UCx    #UTI  - treatment as above    MSK:  #Hip dislocation  XR pelvis: Dislocation of the patient's right total hip arthroplasty with osseous. The change at the lateral aspect of the iliac bone and acetabulum likely chronic in nature. The left hip is normal in appearance. Calcified uterine fibroid. Degenerative changes of the lumbar spine. Vascular calcification. No fractures.  Ortho consulted in ED, recs as follows:   - Closed reduction performed in ED, confirmed by post-reduction XRs   - KI and Abduction pillow  - No surgical intervention indicated  - Posterior hip precautions  - Recommend PT eval once mental status improves  - Possible rehab placement, pending PT eval  - Possible abduction orthosis fitting    #Rheumatoid arthritis  Follows with Dr. Johnston. Per chart review, intermittently on 2mg decadron during flares.  - ctm    PPX:  F: currently receiving 1L LR, s/p 1L NS in ED  E: replete Mg > 2  N: NPO, pending dysphagia screening  GI: --  DVT: --  Dispo: 7LA 74F with PMH of lung cancer (w/ mets to brain), CKD, HLD, DM2, RA (follows with Dr. Johnston), RLE DVT (off Eliquis d/t frequent falls), prior hospitalization for confusion, BIBEMS for AMS and unwitnessed fall, found to be hypertensive to 190s-200s in ED.    NEURO:  #AMS  #Lung Ca with Brain Mets  #Metabolic encephalopathy  2/2 infection vs. HTN vs trauma vs progression of mets  CTH in ED w/o acute abnormality.   Has presented prior with confusion, was discharged to [facility] and [facility] 3hrs/day x 7 days per wk starter per chart review.   - consider Hemeonc consult in AM  - mgmt of HTN as below  - mgmt of infection as below  - delirium and fall precautions  - required lorazepam 1mg in ED, can give haldol vs zyprexa while pt unable to tolerate PO if more agitation not redirectable    CARDS:  #Hypertensive Urgency/Emergency  #HTN  SBP 200s  Goal SBP: 170s overnight, 150 by 01/14 5PM  Home meds per surescripts: lisinopril 10mg qd, nifedipine er 60mg qd. Per outpatient chart review Dec 2024, was meant to stop nifedipine & lisinopril and start amlodipine 10mg; does not appear patient did so.  - Holding home lisinopril and amlodipine/nifedipine for inability to tolerate PO  - s/p lopressor 5mg IVP in ED  - SBP 170s on arrival to [facility], MAP 120s.   --- given hydral 10 + 2.5 IVP when SBP increased to 220s overnight  - start hydralazine 5mg q6 PRN for SBP >160    #HLD  Home med atorvastatin 40mg qd. On exam, patient LLE seen to be cool to touch, pulse palpable. Possibly 2/2 vascular disease.   - restart pending S&S recs  - obtain Dopplers b/l    PULM:  #Hypoxia  91% on RA on admission, now satting at 96-97% on RA, seen to be breathing comfortably, with CTABL and CXR overall clear.  - consider POCUS to r/o effusion/infiltrate in LLL  - given AMS and possible dysphagia, low threshold to start PNA coverage   - f/u full RVP    GI:  #r/o dysphagia  #LUQ Abdominal TTP  Patient too altered to receive PO meds in ED. Also found down. Has no known hx of dysphagia otherwise. Abdomen on admission with TTP in LUQ.   - pending bedside dysphagia screening  - S&S assessment  - NPO until then  - pending official abd xr read  --- appears to have stool burden   --- start suppository as pt can't tolerate PO    Renal/:  #UTI  U/A: turbid, spec grav 1.015, Protein 300, Nitrite negative, Large LE, pH 6.0, , RBC 5, Many bacteria  Hx of recurrent GNR UTIs with MDR. Has had VRE sensitive to ampicillin (10/2024) in the past as well Klebsiella sensitive to CTX (07/2024), Klebsiella sensitive only to carbapenems (12/2023). Now with leukocytosis and + u/a.   - ampicillin 500mg q6  - CTX 1g qd  - f/u UCx and sensitivities  - trend WBC    #DUNIA on CKD  Initial Cr 2.21 (presumed baseline: 1.18-1.32 (08-10/2024)  - f/u uLytes  - q6h bladder scans  - give LR bolus 1L     #Hypomagnesemia  #Hypokalemia  Mg 1.2, K 3.4, likely 2/2 poor PO intake  - replete Mg  - do not replete K iso DUNIA on CKD  - nutrition consult    ENDO:  #IDDM  - mISS    HEME/ONC:  #NSCLC - BMs s/p SRS, liver met.  Follows closely with Dr. Delaney, had appointment 12/10/24 and received gemcitabine. Per chart review: single agent gemcitabine with therapy, given on day 1 and day 8 of each 21-day cycle. "patient is on therapy with gemcitabine requiring intensive monitoring for toxicity such as cytopenias with CBC, CMP Q 2-3 wks"  - Heme/onc consult in AM    #Hx of RLE DVT  Per chart review, patient with hx of RLE DVT and has had swelling of BL LE. Eliquis was discontinued given frequent falls.  - f/u BL LE doppler      ID:  #Leukocytosis  2/2 chemo vs. infection (+RSV 01/02)  - F/u RVP   - F/u uLegionella, uStrep, MRSA swab  - F/u BCx x2  - F/u UCx    #UTI  - treatment as above    MSK:  #Hip dislocation  XR pelvis: Dislocation of the patient's right total hip arthroplasty with osseous. The change at the lateral aspect of the iliac bone and acetabulum likely chronic in nature. The left hip is normal in appearance. Calcified uterine fibroid. Degenerative changes of the lumbar spine. Vascular calcification. No fractures.  Ortho consulted in ED, recs as follows:   - Closed reduction performed in ED, confirmed by post-reduction XRs   - KI and Abduction pillow  - No surgical intervention indicated  - Posterior hip precautions  - Recommend PT eval once mental status improves  - Possible rehab placement, pending PT eval  - Possible abduction orthosis fitting    #Rheumatoid arthritis  Follows with Dr. Johnston. Per chart review, intermittently on 2mg decadron during flares.  - ctm    PPX:  F: currently receiving 1L LR, s/p 1L NS in ED  E: replete Mg > 2  N: NPO, pending dysphagia screening  GI: --  DVT: Heparin subq q8 given DUNIA on CKD  Dispo: 7LA 74F with PMH of lung cancer (w/ mets to brain), CKD, HLD, DM2, RA (follows with Dr. Johnston), RLE DVT (off Eliquis d/t frequent falls), prior hospitalization for confusion, BIBEMS for AMS and unwitnessed fall, found to be hypertensive to 190s-200s in ED.    NEURO:  #AMS  #Lung Ca with Brain Mets  #Metabolic encephalopathy  2/2 infection vs. HTN vs trauma vs progression of mets  CTH in ED w/o acute abnormality.   Has presented prior with confusion, was discharged to [facility] and [facility] 3hrs/day x 7 days per wk starter per chart review.   - consider Hemeonc consult in AM  - mgmt of HTN as below  - mgmt of infection as below  - delirium and fall precautions  - required lorazepam 1mg in ED, can give haldol vs zyprexa while pt unable to tolerate PO if more agitation not redirectable    CARDS:  #Hypertensive Urgency/Emergency  #HTN  #LVH  SBP 200s  Goal SBP: 170s overnight, 150 by 01/14 5PM  Home meds per superscripts: lisinopril 10mg qd, nifedipine er 60mg qd. Per outpatient chart review Dec 2024, was meant to stop nifedipine & lisinopril and start amlodipine 10mg; does not appear patient did so.  TTE 7/2023: EF 60-65%, Grade I Diastolic Dysfunction  Changes consistent with LVH noted on EKG, LVH noted on prior echo.  - Holding home lisinopril and amlodipine/nifedipine for inability to tolerate PO  - s/p lopressor 5mg IVP in ED  - SBP 170s on arrival to [facility], MAP 120s.   --- given hydral 10 + 2.5 IVP when SBP increased to 220s overnight  - start hydralazine 5mg q6 PRN for SBP >160  - f/u TTE complete      #HLD  Home med atorvastatin 40mg qd. On exam, patient LLE seen to be cool to touch, pulse palpable. Possibly 2/2 vascular disease.   - restart pending S&S recs  - obtain Dopplers b/l    PULM:  #Hypoxia  91% on RA on admission, now satting at 96-97% on RA, seen to be breathing comfortably, with CTABL and CXR overall clear.  - consider POCUS to r/o effusion/infiltrate in LLL  - given AMS and possible dysphagia, low threshold to start PNA coverage   - f/u full RVP    GI:  #r/o dysphagia  #LUQ Abdominal TTP  Patient too altered to receive PO meds in ED. Also found down. Has no known hx of dysphagia otherwise. Abdomen on admission with TTP in LUQ.   - pending bedside dysphagia screening  - S&S assessment  - NPO until then  - pending official abd xr read  --- appears to have stool burden   --- start suppository as pt can't tolerate PO    Renal/:  #UTI  U/A: turbid, spec grav 1.015, Protein 300, Nitrite negative, Large LE, pH 6.0, , RBC 5, Many bacteria  Hx of recurrent GNR UTIs with MDR. Has had VRE sensitive to ampicillin (10/2024) in the past as well Klebsiella sensitive to CTX (07/2024), Klebsiella sensitive only to carbapenems (12/2023). Now with leukocytosis and + u/a.   - ampicillin 500mg q6  - CTX 1g qd  - f/u UCx and sensitivities  - trend WBC    #DUNIA on CKD  Initial Cr 2.21 (presumed baseline: 1.18-1.32 (08-10/2024)  - f/u uLytes  - q6h bladder scans  - give LR bolus 1L     #Hypomagnesemia  #Hypokalemia  Mg 1.2, K 3.4, likely 2/2 poor PO intake  - replete Mg  - do not replete K iso DUNIA on CKD  - nutrition consult    ENDO:  #IDDM  - mISS    HEME/ONC:  #NSCLC - BMs s/p SRS, liver met.  Follows closely with Dr. Delaney, had appointment 12/10/24 and received gemcitabine. Per chart review: single agent gemcitabine with therapy, given on day 1 and day 8 of each 21-day cycle. "patient is on therapy with gemcitabine requiring intensive monitoring for toxicity such as cytopenias with CBC, CMP Q 2-3 wks"  - Heme/onc consult in AM    #Hx of RLE DVT  Per chart review, patient with hx of RLE DVT and has had swelling of BL LE. Eliquis was discontinued given frequent falls.  - f/u BL LE doppler      ID:  #Leukocytosis  2/2 chemo vs. infection (+RSV 01/02)  - F/u RVP   - F/u uLegionella, uStrep, MRSA swab  - F/u BCx x2  - F/u UCx    #UTI  - treatment as above    MSK:  #Hip dislocation  XR pelvis: Dislocation of the patient's right total hip arthroplasty with osseous. The change at the lateral aspect of the iliac bone and acetabulum likely chronic in nature. The left hip is normal in appearance. Calcified uterine fibroid. Degenerative changes of the lumbar spine. Vascular calcification. No fractures.  Ortho consulted in ED, recs as follows:   - Closed reduction performed in ED, confirmed by post-reduction XRs   - KI and Abduction pillow  - No surgical intervention indicated  - Posterior hip precautions  - Recommend PT eval once mental status improves  - Possible rehab placement, pending PT eval  - Possible abduction orthosis fitting    #Rheumatoid arthritis  Follows with Dr. Johnston. Per chart review, intermittently on 2mg decadron during flares.  - ctm    PPX:  F: currently receiving 1L LR, s/p 1L NS in ED  E: replete Mg > 2  N: NPO, pending dysphagia screening  GI: --  DVT: Heparin subq q8 given DUNIA on CKD  Dispo: 7LA

## 2025-01-13 NOTE — ED ADULT NURSE NOTE - OBJECTIVE STATEMENT
74 y.o female A&Ox1 (oriented to self) BIBEMS for unwitnessed fall at home. Per EMS, "we came for a wellness check and we heard her yelling through the door 'I'm coming', but when we got inside she was on the floor." Per EMS, PMHx lung cancer and mass in brain. Unknown last known well. EKG done and signed.

## 2025-01-13 NOTE — ED ADULT NURSE NOTE - NSFALLRISKINTERV_ED_ALL_ED
Assistance OOB with selected safe patient handling equipment if applicable/Assistance with ambulation/Communicate fall risk and risk factors to all staff, patient, and family/Monitor gait and stability/Monitor for mental status changes and reorient to person, place, and time, as needed/Provide visual cue: yellow wristband, yellow gown, etc/Reinforce activity limits and safety measures with patient and family/Toileting schedule using arm’s reach rule for commode and bathroom/Use of alarms - bed, stretcher, chair and/or video monitoring/Call bell, personal items and telephone in reach/Instruct patient to call for assistance before getting out of bed/chair/stretcher/Non-slip footwear applied when patient is off stretcher/[place] to call system/Physically safe environment - no spills, clutter or unnecessary equipment/Purposeful Proactive Rounding/Room/bathroom lighting operational, light cord in reach

## 2025-01-13 NOTE — H&P ADULT - NSHPLABSRESULTS_GEN_ALL_CORE
LABS:  cret                        12.2   23.33 )-----------( 190      ( 13 Jan 2025 16:51 )             38.3     01-13    140  |  103  |  27[H]  ----------------------------<  220[H]  3.4[L]   |  26  |  2.07[H]    Ca    9.0      13 Jan 2025 19:00  Mg     1.5     01-13      PT/INR - ( 13 Jan 2025 19:00 )   PT: 13.9 sec;   INR: 1.19          PTT - ( 13 Jan 2025 19:00 )  PTT:27.0 sec

## 2025-01-13 NOTE — ED PROVIDER NOTE - CLINICAL SUMMARY MEDICAL DECISION MAKING FREE TEXT BOX
pt unable to provide any information, called daughter Chloe who gives history  74 F HHA 5 hours daily, ambulates w/ walker pmh dementia, lung cancer mets to brain, CKD, HLD, DM2, RA bibems s/p found down and confused at her apartment by HHA. pt denies fall or any complaints but not reliable as a/o to self only.  on exam bp elevated, afebrile, lying on R side refusing to roll over but able to move all ext and distal pulses intact, +s1/2, b/l breath sounds, ncat, no midline spinal ttp, abd soft, nontender.  ams ? 2/2 infection/uti vs ich vs lyte abn vs less likely cardiac etiology.  r/o pelvis/hip fx and unable to ambulate per ems however able to move all ext.  will obtain labs, ekg, cxr, pelvis xray, ct head

## 2025-01-13 NOTE — ED ADULT TRIAGE NOTE - SOURCE OF INFORMATION
Patient [Alert] : alert [No Acute Distress] : no acute distress [Normocephalic] : normocephalic [EOMI Bilateral] : EOMI bilateral [Clear tympanic membranes with bony landmarks and light reflex present bilaterally] : clear tympanic membranes with bony landmarks and light reflex present bilaterally  [Pink Nasal Mucosa] : pink nasal mucosa [Nonerythematous Oropharynx] : nonerythematous oropharynx [Supple, full passive range of motion] : supple, full passive range of motion [No Palpable Masses] : no palpable masses [Clear to Auscultation Bilaterally] : clear to auscultation bilaterally [Regular Rate and Rhythm] : regular rate and rhythm [Normal S1, S2 audible] : normal S1, S2 audible [No Murmurs] : no murmurs [+2 Femoral Pulses] : +2 femoral pulses [Soft] : soft [NonTender] : non tender [Non Distended] : non distended [Normoactive Bowel Sounds] : normoactive bowel sounds [No Hepatomegaly] : no hepatomegaly [No Splenomegaly] : no splenomegaly [Grayson: _____] : Grayson [unfilled] [No Abnormal Lymph Nodes Palpated] : no abnormal lymph nodes palpated [Normal Muscle Tone] : normal muscle tone [No Gait Asymmetry] : no gait asymmetry [No pain or deformities with palpation of bone, muscles, joints] : no pain or deformities with palpation of bone, muscles, joints [Straight] : straight [+2 Patella DTR] : +2 patella DTR [Cranial Nerves Grossly Intact] : cranial nerves grossly intact [No Rash or Lesions] : no rash or lesions

## 2025-01-13 NOTE — H&P ADULT - NSHPPHYSICALEXAM_GEN_ALL_CORE
VITALS:   T(C): 36.7 (01-13-25 @ 18:18), Max: 37.9 (01-13-25 @ 16:19)  HR: 78 (01-13-25 @ 18:12) (78 - 98)  BP: 184/95 (01-13-25 @ 21:24) (184/95 - 206/98)  RR: 18 (01-13-25 @ 18:12) (18 - 18)  SpO2: 96% (01-13-25 @ 18:12) (91% - 96%)    GENERAL: NAD, lying in bed comfortably  HEAD:  Atraumatic, Normocephalic  EYES: EOMI, PERRLA, conjunctiva and sclera clear  ENT: Moist mucous membranes  NECK: Supple, No JVD  CHEST/LUNG: Clear to auscultation bilaterally; No rales, rhonchi, wheezing, or rubs. Unlabored respirations  HEART: Regular rate and rhythm; No murmurs, rubs, or gallops  ABDOMEN: BSx4; Soft, nontender, nondistended  EXTREMITIES:  2+ Peripheral Pulses, brisk capillary refill. No clubbing, cyanosis, or edema  NERVOUS SYSTEM:  A&Ox3, no focal deficits   SKIN: No rashes or lesions VITALS:   T(C): 36.7 (01-13-25 @ 18:18), Max: 37.9 (01-13-25 @ 16:19)  HR: 78 (01-13-25 @ 18:12) (78 - 98)  BP: 184/95 (01-13-25 @ 21:24) (184/95 - 206/98)  RR: 18 (01-13-25 @ 18:12) (18 - 18)  SpO2: 96% (01-13-25 @ 18:12) (91% - 96%)    GENERAL: NAD, lying in bed comfortably  HEAD:  Atraumatic, Normocephalic  EYES: EOMI, PERRLA, conjunctiva and sclera clear. difficulty performing eye exam; appears to have left hemineglect. no nystagmus.   ENT: Dry mucous membranes, white plaques on tongue, unable to determine if able to be scraped  CHEST/LUNG: Clear to auscultation bilaterally; No rales, rhonchi, wheezing, or rubs. Unlabored respirations.  HEART: Regular rate and rhythm; No murmurs, rubs, or gallops.  ABDOMEN: Soft, nondistended, tender to palpation in LUQ, otherwise nontender in all other regions.  EXTREMITIES: 1+ peripheral pulses, LLE cool to touch, palpable pulses, RLE warm, dry, cracked skin in LE b/l, 1+ pitting edema  NERVOUS SYSTEM: A&Ox3, no focal deficits   SKIN: No rashes or lesions

## 2025-01-13 NOTE — ED ADULT TRIAGE NOTE - CHIEF COMPLAINT QUOTE
Pt presents to ED by EMS S/P unwitnessed fall at home. NANINY states, " Her HHA was there this morning she only gets 5 hours so she sent her away, we came for a wellness check and we heard her yelling through the door " I'm coming" but when we got inside she was on the floor, we were told she has lung CA with a mass in her brain". Pt A&Ox2 on arrival. UNK LKW. EKG/ BG in progress.

## 2025-01-13 NOTE — ED ADULT NURSE REASSESSMENT NOTE - NS ED NURSE REASSESS COMMENT FT1
RN attempted to call report, asked to call back in 15 minutes.
RN attempted to call report, asked to call back in 15 minutes.
RN called to X-ray to assist pt with scans. Scans performed, pt returned to ED, resting in bed. Awaiting X-ray results.
RN second attempt to give report, on hold for ten minutes.
pt refusing ct at this time, pt brought back to main ED for IV access and sedation medications
unable to obtain IV access, PA aware. US guided IV to be placed.
unable to obtain blood work after multiple attempts. MARIBELL chen
Orthopedic team at bedside evaluating pt.
Received report from SOSA Valdes. Received patient in stretcher. AOX0. Vital signs as noted in flowsheet. Patient unable to follow commands. Responsive to painful stimuli. Fluids infusing via 22G R wrist. On RA with adequate chest rise and fall. Remains on continuous telemetry monitor. No family or HHA at bedside at this time. Fall risk precautions maintained. Purposeful proactive hourly rounding in progress. Pending IP bed.

## 2025-01-13 NOTE — H&P ADULT - ATTENDING COMMENTS
74 F with lung cancer (w/ mets to brain), CKD, HLD, DM2, RA, RLE DVT (off Eliquis d/t frequent falls), prior hospitalization for confusion after unwitnessed fall and with confusion. She was found to be hypertensive to 190s-200s and UA is consistent with UTI. Physical exam as above. R hip X -ray showed dislocation. 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   1. UTI  2. Hypertensive urgency  3. Right hip dislocation  4. CKD  5. Lung Ca  - iv fluids  - ampicillin/ceftriaxone  - hydralazine iv q6h prn while unable to take po  - pain control

## 2025-01-13 NOTE — ED ADULT NURSE NOTE - NS ED NURSE TRANSPORT WITH
Universal Safety Interventions Cardiac Monitor/Defib/ACLS/Rescue Kit/O2/BVM/pulse ox/ACLS Rescue Kit

## 2025-01-13 NOTE — H&P ADULT - HISTORY OF PRESENT ILLNESS
74F with PMH of lung cancer (w/ mets to brain), CKD, HLD, DM2, RA BIBEMS for AMS and unwitnessed fall, found to be hypertensive to 190s-200s in ED.        ED COURSE  V/S:  Notable Labs:  EKG:   74F with PMH of lung cancer (w/ mets to brain), CKD, HLD, DM2, RA BIBEMS for AMS and unwitnessed fall, found to be hypertensive to 190s-200s in ED.      As per ED note: Pt unable to provide any information, called daughter Chloe who gives history  74 F HHA 5 hours daily, ambulates w/ walker pmh dementia, lung cancer mets to brain, CKD, HLD, DM2, RA bibems s/p found down and confused at her apartment by HHA.  HHA came to apt around 1130am after running errand and patient wouldnt answer door so 911 called.  pt found on floor of apartment and more confused than normal.  pt states she does not know where she is and has no complaints, just wants to leave.  denies fever, HA, neck/back pain, chest pain, sob, pain in extremities.  rest of ROS she does not answer  2/1 approved for 24hr HHA    ED COURSE  In the ED:  Initial vital signs: T: 98.8 F, HR: 92, BP: 205/95, R: 18, SpO2: 91% on RA  Labs: significant for WBC 23.33, Neutrophil 81.6%, PT 13.9, INR 1.19, aPTT 27.0, K 3.4, BUN 27, Cr 2.07, Gluc 220, eGFR 25   Imaging:  CTH: No evidence of acute intracranial hemorrhage or midline shift. No acute abnormality. 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  CXR: There is a right-sided Mediport with its tip in the SVC. The heart is normal in size. The lungs are clear.  EKG: NSR, L anterior fascicular block, LVH?, HR 92,   XR Pelvis: Dislocation of the patient's right total hip arthroplasty with osseous. The change at the lateral aspect of the iliac bone and acetabulum likely chronic in nature. The left hip is normal in appearance. Calcified uterine fibroid. Degenerative changes of the lumbar spine. Vascular calcification. No fractures.  Medications: Ofirmev 1g x1, CTX 1g x1, 2U lispro, Ativan 1mg x1, Lopressor 5mg IVP x1, 1L NS x1  Consults: none    74F with PMH of lung cancer (w/ mets to brain), CKD, HLD, DM2, RA BIBEMS for AMS and unwitnessed fall, found to be hypertensive to 190s-200s in ED. Daughter (Chloe) relayed history via phone given patients AMS.    This morning HHA saw pt (comes few times per week), went to store for her, was reportedly acting normal in AM; when HHA came back, knocking on door and patient did not answer. HHA called daughter who called mother, daughter called patient who would not answer phone. Mother said from bedroom that she won't come to door, go home; "I'm trying to go to sleep". HHA had just left 1hr prior. When 911/firefighters came, went into bedroom and was on floor. Patient did not realize, appeared to think she was in bed. Reported "little bit of pain".     Baseline: walks with rollator, sometimes walks freely in the apartment. Cooks for herself or daughter brings her food. Has good degree of functionality, washes her clothes in laundry herself; but sometimes forgets to take them out and hang them up.     Prior to, daughter called her Saturday, patient did not . Last spoke to her Friday night did not seem confused. Has had progressive memory deficits for last several months. Unclear if dementia vs brain mets per daughter.     As per ED note: Pt unable to provide any information, called daughter Chloe who gives history  74 F HHA 5 hours daily, ambulates w/ walker pmh dementia, lung cancer mets to brain, CKD, HLD, DM2, RA bibems s/p found down and confused at her apartment by HHA.  HHA came to apt around 1130am after running errand and patient wouldnt answer door so 911 called.  pt found on floor of apartment and more confused than normal.  pt states she does not know where she is and has no complaints, just wants to leave.  denies fever, HA, neck/back pain, chest pain, sob, pain in extremities.  rest of ROS she does not answer  2/1 approved for 24hr [facility]    ED COURSE  Initial vital signs: T: 98.8 F, HR: 92, BP: 205/95, R: 18, SpO2: 91% on RA  Labs: significant for WBC 23.33, Neutrophil 81.6%, PT 13.9, INR 1.19, aPTT 27.0, K 3.4, BUN 27, Cr 2.07, Gluc 220, eGFR 25   Imaging:  CTH: No evidence of acute intracranial hemorrhage or midline shift. No acute abnormality. 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  CXR: There is a right-sided Mediport with its tip in the SVC. The heart is normal in size. The lungs are clear.  EKG: NSR, L anterior fascicular block, LVH?, HR 92,   XR Pelvis: Dislocation of the patient's right total hip arthroplasty with osseous. The change at the lateral aspect of the iliac bone and acetabulum likely chronic in nature. The left hip is normal in appearance. Calcified uterine fibroid. Degenerative changes of the lumbar spine. Vascular calcification. No fractures.  Medications: Ofirmev 1g x1, CTX 1g x1, 2U lispro, Ativan 1mg x1, Lopressor 5mg IVP x1, 1L NS x1  Consults: Ortho --> reduced    74F with PMH of lung cancer (w/ mets to brain), CKD, HLD, DM2, RA BIBEMS for AMS and unwitnessed fall, found to be hypertensive to 190s-200s in ED. Daughter (Chloe) relayed history via phone given patients AMS. Per daughter:   This morning patient's HHA (rotating HHAs ~5hrs/day at least 3days/week) saw the pt who was reportedly acting normal around 9AM, then went to store to get a few things for patient. When HHA came back about 1hr later, began knocking on door but patient did not answer. HHA called pt daughter, who called her mother, but patient would not answer phone. Per daughter, HHA heard patient yell from bedroom that she won't come to door, that HHA should go home; saying "I'm trying to go to sleep". HHA then called 911/Firefighters who brokoe door down and found patient lying on floor of her bedroom. Patient apparently did not realize where she was, appeared to think she was in bed. When asked by police, patient only reported "little bit of pain", thus BIBEMS to hospital.    Daughter further reports that at baseline patient is largely functional; she walks with rollator, but sometimes walks without any assisted device in her apartment. Patient cooks for herself mostly, but daughter also brings her food. Overall reports patient has good degree of functionality but has had progressive memory deficits for past several months; for example patient washes her clothes in laundry herself; but sometimes forgets to take them out and hang them up. Has had discrete episodes of confusion in the past per daughter, which she states is unclear if 2/2 dementia vs brain mets.     In ED, patient was noted to be very confused and combative; requiring sedatives prior to arrival to tele floor.     ED COURSE  Initial vital signs: T: 98.8 F, HR: 92, BP: 205/95, R: 18, SpO2: 91% on RA  Labs: significant for WBC 23.33, Neutrophil 81.6%, PT 13.9, INR 1.19, aPTT 27.0, K 3.4, BUN 27, Cr 2.07, Gluc 220, eGFR 25   Imaging:  CTH: No evidence of acute intracranial hemorrhage or midline shift. No acute abnormality. 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  CXR: There is a right-sided Mediport with its tip in the SVC. The heart is normal in size. The lungs are clear.  EKG: NSR, L anterior fascicular block, LVH?, HR 92,   XR Pelvis: Dislocation of the patient's right total hip arthroplasty with osseous. The change at the lateral aspect of the iliac bone and acetabulum likely chronic in nature. The left hip is normal in appearance. Calcified uterine fibroid. Degenerative changes of the lumbar spine. Vascular calcification. No fractures.  Medications: Ofirmev 1g x1, CTX 1g x1, 2U lispro, Ativan 1mg x1, Lopressor 5mg IVP x1, 1L NS x1  Consults: Ortho --> reduced    74F with PMH of lung cancer (w/ mets to brain), CKD, HLD, DM2, RA BIBEMS for AMS and unwitnessed fall, found to be hypertensive to 190s-200s in ED. Daughter (Chloe) relayed history via phone given patients AMS. Per daughter:   This morning patient's HHA (rotating HHAs ~5hrs/day at least 3days/week) saw the pt who was reportedly acting normal around 9AM, then went to store to get a few things for patient. When HHA came back about 1hr later, began knocking on door but patient did not answer. HHA called pt daughter, who called her mother, but patient would not answer phone. Per daughter, HHA heard patient yell from bedroom that she won't come to door, that HHA should go home; saying "I'm trying to go to sleep". HHA then called 911/Firefighters who broke door down and found patient lying on floor of her bedroom. Patient apparently did not realize where she was, appeared to think she was in bed. When asked by police, patient only reported "little bit of pain", thus BIBEMS to hospital.    Daughter further reports that at baseline patient is largely functional; she walks with rollator, but sometimes walks without any assisted device in her apartment. Patient cooks for herself mostly, but daughter also brings her food. Overall reports patient has good degree of functionality but has had progressive memory deficits for past several months; for example patient washes her clothes in laundry herself; but sometimes forgets to take them out and hang them up. Has had discrete episodes of confusion in the past per daughter, which she states is unclear if 2/2 dementia vs brain mets.     In ED, patient was noted to be very confused and combative; requiring sedatives prior to arrival to tele floor.     ED COURSE  Initial vital signs: T: 98.8 F, HR: 92, BP: 205/95, R: 18, SpO2: 91% on RA  Labs: significant for WBC 23.33, Neutrophil 81.6%, PT 13.9, INR 1.19, aPTT 27.0, K 3.4, BUN 27, Cr 2.07, Gluc 220, eGFR 25   Imaging:  CTH: No evidence of acute intracranial hemorrhage or midline shift. No acute abnormality. 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  CXR: There is a right-sided Mediport with its tip in the SVC. The heart is normal in size. The lungs are clear.  EKG: NSR, L anterior fascicular block, LVH?, HR 92,   XR Pelvis: Dislocation of the patient's right total hip arthroplasty with osseous. The change at the lateral aspect of the iliac bone and acetabulum likely chronic in nature. The left hip is normal in appearance. Calcified uterine fibroid. Degenerative changes of the lumbar spine. Vascular calcification. No fractures.  Medications: Ofirmev 1g x1, CTX 1g x1, 2U lispro, Ativan 1mg x1, Lopressor 5mg IVP x1, 1L NS x1  Consults: Ortho --> reduced    74F with PMH of lung cancer (w/ mets to brain), CKD, HLD, DM2, RA BIBEMS for AMS and unwitnessed fall, found to be hypertensive to 190s-200s in ED. Daughter (Chloe) relayed history via phone given patients AMS. Per daughter:   This morning patient's HHA (rotating HHAs ~5hrs/day at least 3days/week) saw the pt who was reportedly acting normal around 9AM, then went to store to get a few things for patient. When HHA came back about 1hr later, began knocking on door but patient did not answer. HHA called pt daughter, who called her mother, but patient would not answer phone. Per daughter, HHA heard patient yell from bedroom that she won't come to door, that HHA should go home; saying "I'm trying to go to sleep". HHA then called 911/Firefighters who broke door down and found patient lying on floor of her bedroom. Patient apparently did not realize where she was, appeared to think she was in bed. When asked by police, patient only reported "little bit of pain", thus BIBEMS to hospital.    Daughter further reports that at baseline patient is largely functional; she walks with rollator, but sometimes walks without any assisted device in her apartment. Patient cooks for herself mostly, but daughter also brings her food. Overall reports patient has good degree of functionality but has had progressive memory deficits for past several months; for example patient washes her clothes in laundry herself; but sometimes forgets to take them out and hang them up. Has had discrete episodes of confusion in the past per daughter, which she states is unclear if 2/2 dementia vs brain mets.     In ED, patient was noted to be very confused and combative; requiring sedatives prior to arrival to tele floor.     ED COURSE  Initial vital signs: T: 98.8 F, HR: 92, BP: 205/95, R: 18, SpO2: 91% on RA  Labs: significant for WBC 23.33, Neutrophil 81.6%, PT 13.9, INR 1.19, aPTT 27.0, K 3.4, BUN 27, Cr 2.07, Gluc 220, eGFR 25, U/A: turbid, spec grav 1.015, Protein 300, Nitrite negative, Large LE, pH 6.0, , RBC 5, Many bacteria, Cast 1, Present WBC clumps   Imaging:  CTH: No evidence of acute intracranial hemorrhage or midline shift. No acute abnormality. 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  CXR: There is a right-sided Mediport with its tip in the SVC. The heart is normal in size. The lungs are clear.  EKG: NSR, L anterior fascicular block, LVH?, HR 92,   XR Pelvis: Dislocation of the patient's right total hip arthroplasty with osseous. The change at the lateral aspect of the iliac bone and acetabulum likely chronic in nature. The left hip is normal in appearance. Calcified uterine fibroid. Degenerative changes of the lumbar spine. Vascular calcification. No fractures.  Medications: Ofirmev 1g x1, CTX 1g x1, 2U Lispro, Ativan 1mg x1, Lopressor 5mg IVP x1, 1L NS x1  Consults: Ortho --> reduced    74F with PMH of lung cancer (w/ mets to brain), CKD, HLD, DM2, RA (follows with Dr. Johnston), RLE DVT (off Eliquis d/t frequent falls), prior hospitalization for confusion, BIBEMS for AMS and unwitnessed fall, found to be hypertensive to 190s-200s in ED. Daughter (Chloe) relayed history via phone given patients AMS. Per daughter:   This morning patient's HHA (rotating HHAs ~5hrs/day at least 3days/week) saw the pt who was reportedly acting normal around 9AM, then went to store to get a few things for patient. When HHA came back about 1hr later, began knocking on door but patient did not answer. HHA called pt daughter, who called her mother, but patient would not answer phone. Per daughter, HHA heard patient yell from bedroom that she won't come to door, that HHA should go home; saying "I'm trying to go to sleep". HHA then called 911/Firefighters who broke door down and found patient lying on floor of her bedroom. Patient apparently did not realize where she was, appeared to think she was in bed. When asked by police, patient only reported "little bit of pain", thus BIBEMS to hospital.    Daughter further reports that at baseline patient is largely functional; she walks with rollator, but sometimes walks without any assisted device in her apartment. Patient cooks for herself mostly, but daughter also brings her food. Overall reports patient has good degree of functionality but has had progressive memory deficits for past several months; for example patient washes her clothes in laundry herself; but sometimes forgets to take them out and hang them up. Has had discrete episodes of confusion in the past per daughter, which she states is unclear if 2/2 dementia vs brain mets.     In ED, patient was noted to be very confused and combative; was given lorazepam prior to arrival to tele floor.     ED COURSE  Initial vital signs: T: 98.8 F, HR: 92, BP: 205/95, R: 18, SpO2: 91% on RA  Labs: significant for WBC 23.33, Neutrophil 81.6%, PT 13.9, INR 1.19, aPTT 27.0, K 3.4, BUN 27, Cr 2.07, Gluc 220, eGFR 25, U/A: turbid, spec grav 1.015, Protein 300, Nitrite negative, Large LE, pH 6.0, , RBC 5, Many bacteria, Cast 1, Present WBC clumps   Imaging:  CTH: No evidence of acute intracranial hemorrhage or midline shift. No acute abnormality. 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  CXR: There is a right-sided Mediport with its tip in the SVC. The heart is normal in size. The lungs are clear.  EKG: NSR, L anterior fascicular block, LVH?, HR 92,   XR Pelvis: Dislocation of the patient's right total hip arthroplasty with osseous. The change at the lateral aspect of the iliac bone and acetabulum likely chronic in nature. The left hip is normal in appearance. Calcified uterine fibroid. Degenerative changes of the lumbar spine. Vascular calcification. No fractures.  Medications: Ofirmev 1g x1, CTX 1g x1, 2U Lispro, Ativan 1mg x1, Lopressor 5mg IVP x1, 1L NS x1  Consults: Ortho --> reduced R hip dislocation

## 2025-01-13 NOTE — ED PROVIDER NOTE - CARE PLAN
1 Principal Discharge DX:	AMS (altered mental status)  Secondary Diagnosis:	Acute UTI  Secondary Diagnosis:	Fall  Secondary Diagnosis:	Hip dislocation, right  Secondary Diagnosis:	CKD (chronic kidney disease)

## 2025-01-13 NOTE — ED PROVIDER NOTE - OBJECTIVE STATEMENT
pt unable to provide any information, called daughter Chloe who gives history  74 F HHA 5 hours daily, ambulates w/ walker pmh dementia, lung cancer mets to brain, CKD, HLD, DM2, RA bibems s/p found down and confused at her apartment by HHA.  HHA returned to house around 1130am after running errand and patient wouldnt answer door so 911 called.  pt found on floor of apartment and more confused than homero.  pt states she does not know where she is and has no complaints, just wants to leave.  denies fever, HA, neck/back pain, chest pain, sob, pain in extremities.  rest of ROS she does not answer  2/1 approved for 24hr HHA pt unable to provide any information, called daughter Chloe who gives history  74 F HHA 5 hours daily, ambulates w/ walker pmh dementia, lung cancer mets to brain, CKD, HLD, DM2, RA bibems s/p found down and confused at her apartment by HHA.  HHA came to apt around 1130am after running errand and patient wouldnt answer door so 911 called.  pt found on floor of apartment and more confused than normal.  pt states she does not know where she is and has no complaints, just wants to leave.  denies fever, HA, neck/back pain, chest pain, sob, pain in extremities.  rest of ROS she does not answer  2/1 approved for 24hr HHA

## 2025-01-13 NOTE — ED PROVIDER NOTE - PROGRESS NOTE DETAILS
pt agitated and confused, unable to perform radiology imaging.  will give ativan and attempt after labs w/ leukocytosis 23, bmp w/ baseline Cr 2.2, potassium hemolyzedx2 will rpt. trop elevated in setting ckd, ekg no ischemic changes.  straight cath for urine, + UTI.  blood cultures sent and covered w/ abx.  pt unable to take home dose amlodipine after ativan and bp still eelvated so given lopressor.  CT head shows no acute findings.  xray shows R hip dislocation, ortho consulted.  d/w pmd Dr. Douglass and in setting ams from uti and fall w/ hip dislocation will admit to tele

## 2025-01-13 NOTE — CONSULT NOTE ADULT - ATTENDING COMMENTS
Pt evaluated and images reviewed pre and post-reduction.  Arthroplasty componenets appear stable, mild retroversion and vertical acetabular cup position and evidence of HO in gluteal musculature suggestive of possible additional instability history but both patient and daughter report this is only 3rd instability event.    Recommend closed management; PT eval, hip precautions, knee immobilizer, followup with Dr. Almanzar or Dr. Loredo after discharge

## 2025-01-14 ENCOUNTER — RESULT REVIEW (OUTPATIENT)
Age: 75
End: 2025-01-14

## 2025-01-14 ENCOUNTER — NON-APPOINTMENT (OUTPATIENT)
Age: 75
End: 2025-01-14

## 2025-01-14 DIAGNOSIS — N17.9 ACUTE KIDNEY FAILURE, UNSPECIFIED: ICD-10-CM

## 2025-01-14 DIAGNOSIS — R09.02 HYPOXEMIA: ICD-10-CM

## 2025-01-14 DIAGNOSIS — I16.0 HYPERTENSIVE URGENCY: ICD-10-CM

## 2025-01-14 DIAGNOSIS — Z29.9 ENCOUNTER FOR PROPHYLACTIC MEASURES, UNSPECIFIED: ICD-10-CM

## 2025-01-14 DIAGNOSIS — S73.004A UNSPECIFIED DISLOCATION OF RIGHT HIP, INITIAL ENCOUNTER: ICD-10-CM

## 2025-01-14 DIAGNOSIS — Z86.73 PERSONAL HISTORY OF TRANSIENT ISCHEMIC ATTACK (TIA), AND CEREBRAL INFARCTION WITHOUT RESIDUAL DEFICITS: ICD-10-CM

## 2025-01-14 DIAGNOSIS — R10.12 LEFT UPPER QUADRANT PAIN: ICD-10-CM

## 2025-01-14 DIAGNOSIS — E11.9 TYPE 2 DIABETES MELLITUS WITHOUT COMPLICATIONS: ICD-10-CM

## 2025-01-14 DIAGNOSIS — M06.9 RHEUMATOID ARTHRITIS, UNSPECIFIED: ICD-10-CM

## 2025-01-14 DIAGNOSIS — C34.90 MALIGNANT NEOPLASM OF UNSPECIFIED PART OF UNSPECIFIED BRONCHUS OR LUNG: ICD-10-CM

## 2025-01-14 DIAGNOSIS — S73.005A UNSPECIFIED DISLOCATION OF LEFT HIP, INITIAL ENCOUNTER: ICD-10-CM

## 2025-01-14 DIAGNOSIS — D72.829 ELEVATED WHITE BLOOD CELL COUNT, UNSPECIFIED: ICD-10-CM

## 2025-01-14 DIAGNOSIS — R41.82 ALTERED MENTAL STATUS, UNSPECIFIED: ICD-10-CM

## 2025-01-14 DIAGNOSIS — E78.5 HYPERLIPIDEMIA, UNSPECIFIED: ICD-10-CM

## 2025-01-14 DIAGNOSIS — N39.0 URINARY TRACT INFECTION, SITE NOT SPECIFIED: ICD-10-CM

## 2025-01-14 DIAGNOSIS — Z91.89 OTHER SPECIFIED PERSONAL RISK FACTORS, NOT ELSEWHERE CLASSIFIED: ICD-10-CM

## 2025-01-14 DIAGNOSIS — Z86.718 PERSONAL HISTORY OF OTHER VENOUS THROMBOSIS AND EMBOLISM: ICD-10-CM

## 2025-01-14 DIAGNOSIS — S72.009A FRACTURE OF UNSPECIFIED PART OF NECK OF UNSPECIFIED FEMUR, INITIAL ENCOUNTER FOR CLOSED FRACTURE: ICD-10-CM

## 2025-01-14 DIAGNOSIS — I82.401 ACUTE EMBOLISM AND THROMBOSIS OF UNSPECIFIED DEEP VEINS OF RIGHT LOWER EXTREMITY: ICD-10-CM

## 2025-01-14 LAB
A1C WITH ESTIMATED AVERAGE GLUCOSE RESULT: 9 % — HIGH (ref 4–5.6)
ADD ON TEST-SPECIMEN IN LAB: SIGNIFICANT CHANGE UP
ANION GAP SERPL CALC-SCNC: 14 MMOL/L — SIGNIFICANT CHANGE UP (ref 5–17)
BASOPHILS # BLD AUTO: 0.09 K/UL — SIGNIFICANT CHANGE UP (ref 0–0.2)
BASOPHILS NFR BLD AUTO: 0.4 % — SIGNIFICANT CHANGE UP (ref 0–2)
BUN SERPL-MCNC: 27 MG/DL — HIGH (ref 7–23)
CALCIUM SERPL-MCNC: 8.9 MG/DL — SIGNIFICANT CHANGE UP (ref 8.4–10.5)
CHLORIDE SERPL-SCNC: 105 MMOL/L — SIGNIFICANT CHANGE UP (ref 96–108)
CK SERPL-CCNC: 311 U/L — HIGH (ref 25–170)
CO2 SERPL-SCNC: 23 MMOL/L — SIGNIFICANT CHANGE UP (ref 22–31)
CREAT SERPL-MCNC: 2.04 MG/DL — HIGH (ref 0.5–1.3)
EGFR: 25 ML/MIN/1.73M2 — LOW
EOSINOPHIL # BLD AUTO: 0.01 K/UL — SIGNIFICANT CHANGE UP (ref 0–0.5)
EOSINOPHIL NFR BLD AUTO: 0 % — SIGNIFICANT CHANGE UP (ref 0–6)
ESTIMATED AVERAGE GLUCOSE: 212 MG/DL — HIGH (ref 68–114)
GLUCOSE SERPL-MCNC: 146 MG/DL — HIGH (ref 70–99)
HCT VFR BLD CALC: 34.7 % — SIGNIFICANT CHANGE UP (ref 34.5–45)
HGB BLD-MCNC: 10.8 G/DL — LOW (ref 11.5–15.5)
IMM GRANULOCYTES NFR BLD AUTO: 0.7 % — SIGNIFICANT CHANGE UP (ref 0–0.9)
LYMPHOCYTES # BLD AUTO: 1.16 K/UL — SIGNIFICANT CHANGE UP (ref 1–3.3)
LYMPHOCYTES # BLD AUTO: 5.8 % — LOW (ref 13–44)
MAGNESIUM SERPL-MCNC: 2.2 MG/DL — SIGNIFICANT CHANGE UP (ref 1.6–2.6)
MCHC RBC-ENTMCNC: 26.9 PG — LOW (ref 27–34)
MCHC RBC-ENTMCNC: 31.1 G/DL — LOW (ref 32–36)
MCV RBC AUTO: 86.5 FL — SIGNIFICANT CHANGE UP (ref 80–100)
MONOCYTES # BLD AUTO: 1.61 K/UL — HIGH (ref 0–0.9)
MONOCYTES NFR BLD AUTO: 8 % — SIGNIFICANT CHANGE UP (ref 2–14)
NEUTROPHILS # BLD AUTO: 17.04 K/UL — HIGH (ref 1.8–7.4)
NEUTROPHILS NFR BLD AUTO: 85.1 % — HIGH (ref 43–77)
NRBC # BLD: 0 /100 WBCS — SIGNIFICANT CHANGE UP (ref 0–0)
PHOSPHATE SERPL-MCNC: 2.8 MG/DL — SIGNIFICANT CHANGE UP (ref 2.5–4.5)
PLATELET # BLD AUTO: 175 K/UL — SIGNIFICANT CHANGE UP (ref 150–400)
POTASSIUM SERPL-MCNC: 3.3 MMOL/L — LOW (ref 3.5–5.3)
POTASSIUM SERPL-SCNC: 3.3 MMOL/L — LOW (ref 3.5–5.3)
PROCALCITONIN SERPL-MCNC: 1.26 NG/ML — HIGH (ref 0.02–0.1)
RBC # BLD: 4.01 M/UL — SIGNIFICANT CHANGE UP (ref 3.8–5.2)
RBC # FLD: 14.1 % — SIGNIFICANT CHANGE UP (ref 10.3–14.5)
SODIUM SERPL-SCNC: 142 MMOL/L — SIGNIFICANT CHANGE UP (ref 135–145)
TROPONIN T, HIGH SENSITIVITY RESULT: 122 NG/L — CRITICAL HIGH (ref 0–51)
WBC # BLD: 20.05 K/UL — HIGH (ref 3.8–10.5)
WBC # FLD AUTO: 20.05 K/UL — HIGH (ref 3.8–10.5)

## 2025-01-14 PROCEDURE — 99233 SBSQ HOSP IP/OBS HIGH 50: CPT | Mod: GC

## 2025-01-14 PROCEDURE — 93306 TTE W/DOPPLER COMPLETE: CPT | Mod: 26

## 2025-01-14 PROCEDURE — 93970 EXTREMITY STUDY: CPT | Mod: 26

## 2025-01-14 PROCEDURE — 74019 RADEX ABDOMEN 2 VIEWS: CPT | Mod: 26

## 2025-01-14 RX ORDER — CEFTRIAXONE SODIUM 1 G/1
1000 INJECTION, POWDER, FOR SOLUTION INTRAMUSCULAR; INTRAVENOUS EVERY 24 HOURS
Refills: 0 | Status: DISCONTINUED | OUTPATIENT
Start: 2025-01-14 | End: 2025-01-14

## 2025-01-14 RX ORDER — CEFTRIAXONE SODIUM 1 G/1
2000 INJECTION, POWDER, FOR SOLUTION INTRAMUSCULAR; INTRAVENOUS EVERY 24 HOURS
Refills: 0 | Status: DISCONTINUED | OUTPATIENT
Start: 2025-01-14 | End: 2025-01-15

## 2025-01-14 RX ORDER — HYDRALAZINE HYDROCHLORIDE 10 MG/1
2.5 TABLET ORAL ONCE
Refills: 0 | Status: COMPLETED | OUTPATIENT
Start: 2025-01-14 | End: 2025-01-14

## 2025-01-14 RX ORDER — POTASSIUM CHLORIDE 600 MG/1
40 TABLET, FILM COATED, EXTENDED RELEASE ORAL
Refills: 0 | Status: COMPLETED | OUTPATIENT
Start: 2025-01-14 | End: 2025-01-14

## 2025-01-14 RX ORDER — HYDRALAZINE HYDROCHLORIDE 10 MG/1
10 TABLET ORAL ONCE
Refills: 0 | Status: COMPLETED | OUTPATIENT
Start: 2025-01-14 | End: 2025-01-14

## 2025-01-14 RX ORDER — MAGNESIUM SULFATE 500 MG/ML
2 INJECTION, SOLUTION INTRAMUSCULAR; INTRAVENOUS ONCE
Refills: 0 | Status: COMPLETED | OUTPATIENT
Start: 2025-01-14 | End: 2025-01-14

## 2025-01-14 RX ORDER — INSULIN LISPRO 100/ML
7 VIAL (ML) SUBCUTANEOUS
Refills: 0 | Status: DISCONTINUED | OUTPATIENT
Start: 2025-01-14 | End: 2025-01-17

## 2025-01-14 RX ORDER — HEPARIN SODIUM 1000 [USP'U]/ML
5000 INJECTION, SOLUTION INTRAVENOUS; SUBCUTANEOUS EVERY 8 HOURS
Refills: 0 | Status: DISCONTINUED | OUTPATIENT
Start: 2025-01-14 | End: 2025-01-17

## 2025-01-14 RX ORDER — INSULIN GLARGINE-YFGN 100 [IU]/ML
16 INJECTION, SOLUTION SUBCUTANEOUS AT BEDTIME
Refills: 0 | Status: DISCONTINUED | OUTPATIENT
Start: 2025-01-14 | End: 2025-01-17

## 2025-01-14 RX ORDER — BISACODYL 5 MG
10 TABLET, DELAYED RELEASE (ENTERIC COATED) ORAL ONCE
Refills: 0 | Status: COMPLETED | OUTPATIENT
Start: 2025-01-14 | End: 2025-01-14

## 2025-01-14 RX ORDER — HYDRALAZINE HYDROCHLORIDE 10 MG/1
5 TABLET ORAL EVERY 6 HOURS
Refills: 0 | Status: DISCONTINUED | OUTPATIENT
Start: 2025-01-14 | End: 2025-01-14

## 2025-01-14 RX ORDER — SODIUM CHLORIDE 9 MG/ML
1000 INJECTION, SOLUTION INTRAVENOUS ONCE
Refills: 0 | Status: COMPLETED | OUTPATIENT
Start: 2025-01-14 | End: 2025-01-14

## 2025-01-14 RX ORDER — AMPICILLIN TRIHYDRATE 125 MG/5ML
500 SUSPENSION, RECONSTITUTED, ORAL (ML) ORAL EVERY 6 HOURS
Refills: 0 | Status: DISCONTINUED | OUTPATIENT
Start: 2025-01-14 | End: 2025-01-17

## 2025-01-14 RX ORDER — NIFEDIPINE 60 MG/1
60 TABLET, EXTENDED RELEASE ORAL EVERY 24 HOURS
Refills: 0 | Status: DISCONTINUED | OUTPATIENT
Start: 2025-01-14 | End: 2025-01-15

## 2025-01-14 RX ORDER — POTASSIUM CHLORIDE 600 MG/1
10 TABLET, FILM COATED, EXTENDED RELEASE ORAL
Refills: 0 | Status: COMPLETED | OUTPATIENT
Start: 2025-01-14 | End: 2025-01-14

## 2025-01-14 RX ORDER — ATORVASTATIN CALCIUM 40 MG/1
40 TABLET, FILM COATED ORAL AT BEDTIME
Refills: 0 | Status: DISCONTINUED | OUTPATIENT
Start: 2025-01-14 | End: 2025-01-17

## 2025-01-14 RX ADMIN — POTASSIUM CHLORIDE 40 MILLIEQUIVALENT(S): 600 TABLET, FILM COATED, EXTENDED RELEASE ORAL at 15:49

## 2025-01-14 RX ADMIN — HYDRALAZINE HYDROCHLORIDE 5 MILLIGRAM(S): 10 TABLET ORAL at 06:44

## 2025-01-14 RX ADMIN — ATORVASTATIN CALCIUM 40 MILLIGRAM(S): 40 TABLET, FILM COATED ORAL at 21:51

## 2025-01-14 RX ADMIN — HEPARIN SODIUM 5000 UNIT(S): 1000 INJECTION, SOLUTION INTRAVENOUS; SUBCUTANEOUS at 21:51

## 2025-01-14 RX ADMIN — Medication 10 MILLIGRAM(S): at 02:19

## 2025-01-14 RX ADMIN — Medication 104 MILLIGRAM(S): at 06:44

## 2025-01-14 RX ADMIN — POTASSIUM CHLORIDE 100 MILLIEQUIVALENT(S): 600 TABLET, FILM COATED, EXTENDED RELEASE ORAL at 07:27

## 2025-01-14 RX ADMIN — HEPARIN SODIUM 5000 UNIT(S): 1000 INJECTION, SOLUTION INTRAVENOUS; SUBCUTANEOUS at 06:44

## 2025-01-14 RX ADMIN — POTASSIUM CHLORIDE 40 MILLIEQUIVALENT(S): 600 TABLET, FILM COATED, EXTENDED RELEASE ORAL at 18:26

## 2025-01-14 RX ADMIN — HYDRALAZINE HYDROCHLORIDE 10 MILLIGRAM(S): 10 TABLET ORAL at 02:09

## 2025-01-14 RX ADMIN — Medication 6: at 21:48

## 2025-01-14 RX ADMIN — Medication 104 MILLIGRAM(S): at 02:53

## 2025-01-14 RX ADMIN — HEPARIN SODIUM 5000 UNIT(S): 1000 INJECTION, SOLUTION INTRAVENOUS; SUBCUTANEOUS at 15:49

## 2025-01-14 RX ADMIN — POTASSIUM CHLORIDE 100 MILLIEQUIVALENT(S): 600 TABLET, FILM COATED, EXTENDED RELEASE ORAL at 07:23

## 2025-01-14 RX ADMIN — POTASSIUM CHLORIDE 100 MILLIEQUIVALENT(S): 600 TABLET, FILM COATED, EXTENDED RELEASE ORAL at 06:43

## 2025-01-14 RX ADMIN — SODIUM CHLORIDE 333.33 MILLILITER(S): 9 INJECTION, SOLUTION INTRAVENOUS at 02:18

## 2025-01-14 RX ADMIN — Medication 104 MILLIGRAM(S): at 21:51

## 2025-01-14 RX ADMIN — MAGNESIUM SULFATE 25 GRAM(S): 500 INJECTION, SOLUTION INTRAMUSCULAR; INTRAVENOUS at 02:18

## 2025-01-14 RX ADMIN — Medication 7 UNIT(S): at 16:41

## 2025-01-14 RX ADMIN — CEFTRIAXONE SODIUM 100 MILLIGRAM(S): 1 INJECTION, POWDER, FOR SOLUTION INTRAMUSCULAR; INTRAVENOUS at 18:26

## 2025-01-14 RX ADMIN — NIFEDIPINE 60 MILLIGRAM(S): 60 TABLET, EXTENDED RELEASE ORAL at 18:36

## 2025-01-14 RX ADMIN — INSULIN GLARGINE-YFGN 16 UNIT(S): 100 INJECTION, SOLUTION SUBCUTANEOUS at 21:50

## 2025-01-14 RX ADMIN — HYDRALAZINE HYDROCHLORIDE 2.5 MILLIGRAM(S): 10 TABLET ORAL at 01:41

## 2025-01-14 RX ADMIN — HYDRALAZINE HYDROCHLORIDE 5 MILLIGRAM(S): 10 TABLET ORAL at 12:57

## 2025-01-14 NOTE — PROGRESS NOTE ADULT - SUBJECTIVE AND OBJECTIVE BOX
HOSPITAL COURSE: 74 year old female with PMHx of lung cancer (w/ mets to brain), CKD, HLD, DM2, RA (follows with Dr. Johnston), RLE DVT (off Eliquis d/t frequent falls), prior hospitalization for confusion, BIBEMS for AMS and unwitnessed fall, found to be hypertensive to 190s-200s and severely agitated in the ED. Received ativan 1mg x1, Lopressor 5mg IVP x1, 1L NS x1. XR Pelvis performed showed dislocation of the patient's right total hip arthroplasty with osseous. Orthopedic surgery was consulted and they performed a R hip reduction while in the ED, and pt was admitted to [facility] for closer monitoring. In the unit, SBP of 170 and pt wasn't able to tolerate PO so was given hydralazine 2.5mg IVP. Another dose of hydral 10mg IVP was given along with 1L LR over 3 hours. Standing hydral q6h ordered. Pt is s/p 1 straight cath now given BS >500cc, repeat BS this afternoon ~100cc. While in the unit, patient also passed dysphagia screen and was started on CC diet with evening snack. No further surgical intervention per ortho, appreciate further recs. Her altered mentation is greatly improved, she has been hemodynamically stable, and is thus ready for stepdown to [facility].     INTERVAL HPI/OVERNIGHT EVENTS:  O/N:  This morning: Patient was seen and examined at bedside.      VITAL SIGNS:  T(F): 99.1 (01-14-25 @ 18:06)  HR: 94 (01-14-25 @ 15:56)  BP: 189/81 (01-14-25 @ 15:56)  RR: 18 (01-14-25 @ 15:56)  SpO2: 98% (01-14-25 @ 15:56)  Wt(kg): --    PHYSICAL EXAM:  General: NAD, lying in bed  HEENT: PERRL, EOM intact, sclera anicteric  Cardiovascular: RRR; no MRG  Respiratory: CTAB; no WRR  GI/: soft; NTND  Extremities: WWP; 2+ peripheral pulses bilaterally; no LE edema; legs with abduction pillow  Skin: normal color & turgor; no rash noted  Neurologic: AOx2-3; no focal deficits      MEDICATIONS  (STANDING):  ampicillin  IVPB 500 milliGRAM(s) IV Intermittent every 6 hours  atorvastatin 40 milliGRAM(s) Oral at bedtime  cefTRIAXone   IVPB 2000 milliGRAM(s) IV Intermittent every 24 hours  glucagon  Injectable 1 milliGRAM(s) IntraMuscular once  heparin   Injectable 5000 Unit(s) SubCutaneous every 8 hours  insulin glargine Injectable (LANTUS) 16 Unit(s) SubCutaneous at bedtime  insulin lispro (ADMELOG) corrective regimen sliding scale   SubCutaneous Before meals and at bedtime  insulin lispro Injectable (ADMELOG) 7 Unit(s) SubCutaneous three times a day before meals  NIFEdipine XL 60 milliGRAM(s) Oral every 24 hours    MEDICATIONS  (PRN):  acetaminophen     Tablet .. 650 milliGRAM(s) Oral every 6 hours PRN Temp greater or equal to 38C (100.4F), Mild Pain (1 - 3)  dextrose Oral Gel 15 Gram(s) Oral once PRN Blood Glucose LESS THAN 70 milliGRAM(s)/deciliter  ondansetron Injectable 4 milliGRAM(s) IV Push every 8 hours PRN Nausea and/or Vomiting      Allergies    citrus (Urticaria)  Bactrim (Rash)    Intolerances        LABS:                        10.8   20.05 )-----------( 175      ( 14 Jan 2025 05:30 )             34.7     01-14    142  |  105  |  27[H]  ----------------------------<  146[H]  3.3[L]   |  23  |  2.04[H]    Ca    8.9      14 Jan 2025 05:30  Phos  2.8     01-14  Mg     2.2     01-14      PT/INR - ( 13 Jan 2025 19:00 )   PT: 13.9 sec;   INR: 1.19          PTT - ( 13 Jan 2025 19:00 )  PTT:27.0 sec  Urinalysis Basic - ( 14 Jan 2025 05:30 )    Color: x / Appearance: x / SG: x / pH: x  Gluc: 146 mg/dL / Ketone: x  / Bili: x / Urobili: x   Blood: x / Protein: x / Nitrite: x   Leuk Esterase: x / RBC: x / WBC x   Sq Epi: x / Non Sq Epi: x / Bacteria: x              RADIOLOGY & ADDITIONAL TESTS:  Reviewed **INCOMPLETE note - stepdown held from [facility] to [facility] (held 2/ to BP) **      HOSPITAL COURSE: 74 year old female with PMHx of lung cancer (w/ mets to brain), CKD, HLD, DM2, RA (follows with Dr. Johnston), RLE DVT (off Eliquis d/t frequent falls), prior hospitalization for confusion, BIBEMS for AMS and unwitnessed fall, found to be hypertensive to 190s-200s and severely agitated in the ED. Received ativan 1mg x1, Lopressor 5mg IVP x1, 1L NS x1. XR Pelvis performed showed dislocation of the patient's right total hip arthroplasty with osseous. Orthopedic surgery was consulted and they performed a R hip reduction while in the ED, and pt was admitted to [facility] for closer monitoring. In the unit, SBP of 170 and pt wasn't able to tolerate PO so was given hydralazine 2.5mg IVP. Another dose of hydral 10mg IVP was given along with 1L LR over 3 hours. Standing hydral q6h ordered. Pt is s/p 1 straight cath now given BS >500cc, repeat BS this afternoon ~100cc. While in the unit, patient also passed dysphagia screen and was started on CC diet with evening snack. No further surgical intervention per ortho, appreciate further recs. Her altered mentation is greatly improved, she has been hemodynamically stable, and is thus ready for stepdown to [facility].     INTERVAL HPI/OVERNIGHT EVENTS:  O/N:  This morning: Patient was seen and examined at bedside.    Patient was on IV hydral throughout admission has been on hydralazine 5mg q6h for SBP >160 since 1/13  patient asymptomatic, was started on nifedipine 60mg at 1800, SBP 200s, no headache, dizziness, nausea, leg pain, or chest pain   patient is able to communicate and appreciate some parts of her illness    VITAL SIGNS:  T(F): 99.1 (01-14-25 @ 18:06)  HR: 94 (01-14-25 @ 15:56)  BP: 189/81 (01-14-25 @ 15:56)  RR: 18 (01-14-25 @ 15:56)  SpO2: 98% (01-14-25 @ 15:56)  Wt(kg): --    PHYSICAL EXAM:  General: NAD, lying in bed  HEENT: PERRL, EOM intact, sclera anicteric  Cardiovascular: RRR; no MRG  Respiratory: CTAB; no WRR  GI/: soft; NTND  Extremities: WWP; 2+ peripheral pulses bilaterally; no LE edema; legs with abduction pillow  Skin: normal color & turgor; no rash noted  Neurologic: AOx2-3; no focal deficits      MEDICATIONS  (STANDING):  ampicillin  IVPB 500 milliGRAM(s) IV Intermittent every 6 hours  atorvastatin 40 milliGRAM(s) Oral at bedtime  cefTRIAXone   IVPB 2000 milliGRAM(s) IV Intermittent every 24 hours  glucagon  Injectable 1 milliGRAM(s) IntraMuscular once  heparin   Injectable 5000 Unit(s) SubCutaneous every 8 hours  insulin glargine Injectable (LANTUS) 16 Unit(s) SubCutaneous at bedtime  insulin lispro (ADMELOG) corrective regimen sliding scale   SubCutaneous Before meals and at bedtime  insulin lispro Injectable (ADMELOG) 7 Unit(s) SubCutaneous three times a day before meals  NIFEdipine XL 60 milliGRAM(s) Oral every 24 hours    MEDICATIONS  (PRN):  acetaminophen     Tablet .. 650 milliGRAM(s) Oral every 6 hours PRN Temp greater or equal to 38C (100.4F), Mild Pain (1 - 3)  dextrose Oral Gel 15 Gram(s) Oral once PRN Blood Glucose LESS THAN 70 milliGRAM(s)/deciliter  ondansetron Injectable 4 milliGRAM(s) IV Push every 8 hours PRN Nausea and/or Vomiting      Allergies    citrus (Urticaria)  Bactrim (Rash)    Intolerances        LABS:                        10.8   20.05 )-----------( 175      ( 14 Jan 2025 05:30 )             34.7     01-14    142  |  105  |  27[H]  ----------------------------<  146[H]  3.3[L]   |  23  |  2.04[H]    Ca    8.9      14 Jan 2025 05:30  Phos  2.8     01-14  Mg     2.2     01-14      PT/INR - ( 13 Jan 2025 19:00 )   PT: 13.9 sec;   INR: 1.19          PTT - ( 13 Jan 2025 19:00 )  PTT:27.0 sec  Urinalysis Basic - ( 14 Jan 2025 05:30 )    Color: x / Appearance: x / SG: x / pH: x  Gluc: 146 mg/dL / Ketone: x  / Bili: x / Urobili: x   Blood: x / Protein: x / Nitrite: x   Leuk Esterase: x / RBC: x / WBC x   Sq Epi: x / Non Sq Epi: x / Bacteria: x              RADIOLOGY & ADDITIONAL TESTS:  Reviewed --------TRANSFER [facility] TO [facility]--------  HOSPITAL COURSE: 74 year old female with PMHx of lung cancer (w/ mets to brain), CKD, HLD, DM2, RA (follows with Dr. Johnston), RLE DVT (off Eliquis d/t frequent falls), prior hospitalization for confusion, BIBEMS for AMS and unwitnessed fall, found to be hypertensive to 190s-200s and severely agitated in the ED. Received ativan 1mg x1, Lopressor 5mg IVP x1, 1L NS x1. XR Pelvis performed showed dislocation of the patient's right total hip arthroplasty with osseous. Orthopedic surgery was consulted and they performed a R hip reduction while in the ED, and pt was admitted to [facility] for closer monitoring. In the unit, SBP of 170 and pt wasn't able to tolerate PO so was given hydralazine 2.5mg IVP. Another dose of hydral 10mg IVP was given along with 1L LR over 3 hours. Standing hydral q6h ordered. Pt is s/p 1 straight cath now given BS >500cc, repeat BS this afternoon ~100cc. While in the unit, patient also passed dysphagia screen and was started on CC diet with evening snack. No further surgical intervention per ortho, appreciate further recs. Her altered mentation is greatly improved, she has been hemodynamically stable, and is thus ready for stepdown to [facility].     INTERVAL HPI/OVERNIGHT EVENTS:  O/N:  This morning: Patient was seen and examined at bedside.    Patient was on IV hydral throughout admission has been on hydralazine 5mg q6h for SBP >160 since 1/13  patient asymptomatic, was started on nifedipine 60mg at 1800, SBP 200s, no headache, dizziness, nausea, leg pain, or chest pain   patient is able to communicate and appreciate some parts of her illness    VITAL SIGNS:  T(F): 99.1 (01-14-25 @ 18:06)  HR: 94 (01-14-25 @ 15:56)  BP: 189/81 (01-14-25 @ 15:56)  RR: 18 (01-14-25 @ 15:56)  SpO2: 98% (01-14-25 @ 15:56)  Wt(kg): --    PHYSICAL EXAM:  General: NAD, lying in bed  HEENT: PERRL, EOM intact, sclera anicteric  Cardiovascular: RRR; no MRG  Respiratory: CTAB; no WRR  GI/: soft; NTND  Extremities: WWP; 2+ peripheral pulses bilaterally; no LE edema; legs with abduction pillow  Skin: normal color & turgor; no rash noted  Neurologic: AOx2-3; no focal deficits      MEDICATIONS  (STANDING):  ampicillin  IVPB 500 milliGRAM(s) IV Intermittent every 6 hours  atorvastatin 40 milliGRAM(s) Oral at bedtime  cefTRIAXone   IVPB 2000 milliGRAM(s) IV Intermittent every 24 hours  glucagon  Injectable 1 milliGRAM(s) IntraMuscular once  heparin   Injectable 5000 Unit(s) SubCutaneous every 8 hours  insulin glargine Injectable (LANTUS) 16 Unit(s) SubCutaneous at bedtime  insulin lispro (ADMELOG) corrective regimen sliding scale   SubCutaneous Before meals and at bedtime  insulin lispro Injectable (ADMELOG) 7 Unit(s) SubCutaneous three times a day before meals  NIFEdipine XL 60 milliGRAM(s) Oral every 24 hours    MEDICATIONS  (PRN):  acetaminophen     Tablet .. 650 milliGRAM(s) Oral every 6 hours PRN Temp greater or equal to 38C (100.4F), Mild Pain (1 - 3)  dextrose Oral Gel 15 Gram(s) Oral once PRN Blood Glucose LESS THAN 70 milliGRAM(s)/deciliter  ondansetron Injectable 4 milliGRAM(s) IV Push every 8 hours PRN Nausea and/or Vomiting      Allergies    citrus (Urticaria)  Bactrim (Rash)    Intolerances        LABS:                        10.8   20.05 )-----------( 175      ( 14 Jan 2025 05:30 )             34.7     01-14    142  |  105  |  27[H]  ----------------------------<  146[H]  3.3[L]   |  23  |  2.04[H]    Ca    8.9      14 Jan 2025 05:30  Phos  2.8     01-14  Mg     2.2     01-14      PT/INR - ( 13 Jan 2025 19:00 )   PT: 13.9 sec;   INR: 1.19          PTT - ( 13 Jan 2025 19:00 )  PTT:27.0 sec  Urinalysis Basic - ( 14 Jan 2025 05:30 )    Color: x / Appearance: x / SG: x / pH: x  Gluc: 146 mg/dL / Ketone: x  / Bili: x / Urobili: x   Blood: x / Protein: x / Nitrite: x   Leuk Esterase: x / RBC: x / WBC x   Sq Epi: x / Non Sq Epi: x / Bacteria: x              RADIOLOGY & ADDITIONAL TESTS:  Reviewed --------TRANSFER [facility] TO [facility]--------  HOSPITAL COURSE: 74 year old female with PMHx of lung cancer (w/ mets to brain), CKD, HLD, DM2, RA (follows with Dr. Johnston), RLE DVT (off Eliquis d/t frequent falls), prior hospitalization for confusion, BIBEMS for AMS and unwitnessed fall, found to be hypertensive to 190s-200s and severely agitated in the ED. Received ativan 1mg x1, Lopressor 5mg IVP x1, 1L NS x1. XR Pelvis performed showed dislocation of the patient's right total hip arthroplasty with osseous. Orthopedic surgery was consulted and they performed a R hip reduction while in the ED, and pt was admitted to [facility] for closer monitoring. In the unit, SBP of 170 and pt wasn't able to tolerate PO so was given hydralazine 2.5mg IVP. Another dose of hydral 10mg IVP was given along with 1L LR over 3 hours. Standing hydral q6h ordered. Pt is s/p 1 straight cath now given BS >500cc, repeat BS this afternoon ~100cc. While in the unit, patient also passed dysphagia screen and was started on CC diet with evening snack. No further surgical intervention per ortho, appreciate further recs. Her altered mentation is greatly improved, she has been hemodynamically stable, and is thus ready for stepdown to [facility].     SUBJECTIVE: Patient encountered at bedside. Denies HA, CP, SOB.     VITAL SIGNS:  T(C): 37.2 (14 Jan 2025 21:10), Max: 37.6 (14 Jan 2025 09:35)  T(F): 99 (14 Jan 2025 21:10), Max: 99.6 (14 Jan 2025 09:35)  HR: 100 (14 Jan 2025 21:41) (76 - 100)  BP: 130/60 (14 Jan 2025 21:41) (130/60 - 220/84)  BP(mean): 87 (14 Jan 2025 21:41) (87 - 127)  ABP: --  ABP(mean): --  RR: 18 (14 Jan 2025 21:41) (18 - 18)  SpO2: 96% (14 Jan 2025 21:41) (96% - 99%)    O2 Parameters below as of 14 Jan 2025 18:37  Patient On (Oxygen Delivery Method): room air      PHYSICAL EXAM:  General: NAD, lying in bed  HEENT: PERRL, EOM intact, sclera anicteric  Cardiovascular: RRR; no MRG  Respiratory: CTAB; no WRR  GI/: soft; NTND  Extremities: WWP; 2+ peripheral pulses bilaterally; no LE edema; legs with abduction pillow  Skin: normal color & turgor; no rash noted  Neurologic: AOx2-3; no focal deficits      MEDICATIONS  (STANDING):  ampicillin  IVPB 500 milliGRAM(s) IV Intermittent every 6 hours  atorvastatin 40 milliGRAM(s) Oral at bedtime  cefTRIAXone   IVPB 2000 milliGRAM(s) IV Intermittent every 24 hours  glucagon  Injectable 1 milliGRAM(s) IntraMuscular once  heparin   Injectable 5000 Unit(s) SubCutaneous every 8 hours  insulin glargine Injectable (LANTUS) 16 Unit(s) SubCutaneous at bedtime  insulin lispro (ADMELOG) corrective regimen sliding scale   SubCutaneous Before meals and at bedtime  insulin lispro Injectable (ADMELOG) 7 Unit(s) SubCutaneous three times a day before meals  NIFEdipine XL 60 milliGRAM(s) Oral every 24 hours    MEDICATIONS  (PRN):  acetaminophen     Tablet .. 650 milliGRAM(s) Oral every 6 hours PRN Temp greater or equal to 38C (100.4F), Mild Pain (1 - 3)  dextrose Oral Gel 15 Gram(s) Oral once PRN Blood Glucose LESS THAN 70 milliGRAM(s)/deciliter  ondansetron Injectable 4 milliGRAM(s) IV Push every 8 hours PRN Nausea and/or Vomiting      Allergies    citrus (Urticaria)  Bactrim (Rash)    Intolerances        LABS:                        10.8   20.05 )-----------( 175      ( 14 Jan 2025 05:30 )             34.7     01-14    142  |  105  |  27[H]  ----------------------------<  146[H]  3.3[L]   |  23  |  2.04[H]    Ca    8.9      14 Jan 2025 05:30  Phos  2.8     01-14  Mg     2.2     01-14      PT/INR - ( 13 Jan 2025 19:00 )   PT: 13.9 sec;   INR: 1.19          PTT - ( 13 Jan 2025 19:00 )  PTT:27.0 sec  Urinalysis Basic - ( 14 Jan 2025 05:30 )    Color: x / Appearance: x / SG: x / pH: x  Gluc: 146 mg/dL / Ketone: x  / Bili: x / Urobili: x   Blood: x / Protein: x / Nitrite: x   Leuk Esterase: x / RBC: x / WBC x   Sq Epi: x / Non Sq Epi: x / Bacteria: x              RADIOLOGY & ADDITIONAL TESTS:  Reviewed --------TRANSFER [facility] TO [facility]--------  HOSPITAL COURSE: 74 year old female with PMHx of lung cancer (w/ mets to brain), CKD, HLD, DM2, RA (follows with Dr. Johnston), RLE DVT (off Eliquis d/t frequent falls), prior hospitalization for confusion, BIBEMS for AMS and unwitnessed fall, found to be hypertensive to 190s-200s and severely agitated in the ED. Received ativan 1mg x1, Lopressor 5mg IVP x1, 1L NS x1. XR Pelvis performed showed dislocation of the patient's right total hip arthroplasty with osseous. Orthopedic surgery was consulted and they performed a R hip reduction while in the ED, and pt was admitted to [facility] for closer monitoring. In the unit, SBP of 170 and pt wasn't able to tolerate PO so was given hydralazine 2.5mg IVP. Another dose of hydral 10mg IVP was given along with 1L LR over 3 hours. Standing hydral q6h ordered. Pt is s/p 1 straight cath now given BS >500cc, repeat BS this afternoon ~100cc. While in the unit, patient also passed dysphagia screen and was started on CC diet with evening snack. No further surgical intervention per ortho, appreciate further recs. Her altered mentation has improved, she has been hemodynamically stable, and is thus ready for stepdown to [facility].     SUBJECTIVE: Patient encountered at bedside. Denies HA, CP, SOB.     VITAL SIGNS:  T(C): 37.2 (14 Jan 2025 21:10), Max: 37.6 (14 Jan 2025 09:35)  T(F): 99 (14 Jan 2025 21:10), Max: 99.6 (14 Jan 2025 09:35)  HR: 100 (14 Jan 2025 21:41) (76 - 100)  BP: 130/60 (14 Jan 2025 21:41) (130/60 - 220/84)  BP(mean): 87 (14 Jan 2025 21:41) (87 - 127)  ABP: --  ABP(mean): --  RR: 18 (14 Jan 2025 21:41) (18 - 18)  SpO2: 96% (14 Jan 2025 21:41) (96% - 99%)    O2 Parameters below as of 14 Jan 2025 18:37  Patient On (Oxygen Delivery Method): room air      PHYSICAL EXAM:  General: NAD, lying in bed  HEENT: PERRL, EOM intact  Cardiovascular: RRR; no MRG  Respiratory: CTAB; no WRR  GI/: soft; NTND  Extremities: WWP; 2+ peripheral pulses bilaterally; no LE edema; legs with abduction pillow  Neurologic: AOx2-3; no focal deficits    MEDICATIONS  (STANDING):  ampicillin  IVPB 500 milliGRAM(s) IV Intermittent every 6 hours  atorvastatin 40 milliGRAM(s) Oral at bedtime  cefTRIAXone   IVPB 2000 milliGRAM(s) IV Intermittent every 24 hours  glucagon  Injectable 1 milliGRAM(s) IntraMuscular once  heparin   Injectable 5000 Unit(s) SubCutaneous every 8 hours  insulin glargine Injectable (LANTUS) 16 Unit(s) SubCutaneous at bedtime  insulin lispro (ADMELOG) corrective regimen sliding scale   SubCutaneous Before meals and at bedtime  insulin lispro Injectable (ADMELOG) 7 Unit(s) SubCutaneous three times a day before meals  NIFEdipine XL 60 milliGRAM(s) Oral every 24 hours    MEDICATIONS  (PRN):  acetaminophen     Tablet .. 650 milliGRAM(s) Oral every 6 hours PRN Temp greater or equal to 38C (100.4F), Mild Pain (1 - 3)  dextrose Oral Gel 15 Gram(s) Oral once PRN Blood Glucose LESS THAN 70 milliGRAM(s)/deciliter  ondansetron Injectable 4 milliGRAM(s) IV Push every 8 hours PRN Nausea and/or Vomiting      Allergies    citrus (Urticaria)  Bactrim (Rash)    Intolerances        LABS:                        10.8   20.05 )-----------( 175      ( 14 Jan 2025 05:30 )             34.7     01-14    142  |  105  |  27[H]  ----------------------------<  146[H]  3.3[L]   |  23  |  2.04[H]    Ca    8.9      14 Jan 2025 05:30  Phos  2.8     01-14  Mg     2.2     01-14      PT/INR - ( 13 Jan 2025 19:00 )   PT: 13.9 sec;   INR: 1.19          PTT - ( 13 Jan 2025 19:00 )  PTT:27.0 sec  Urinalysis Basic - ( 14 Jan 2025 05:30 )    Color: x / Appearance: x / SG: x / pH: x  Gluc: 146 mg/dL / Ketone: x  / Bili: x / Urobili: x   Blood: x / Protein: x / Nitrite: x   Leuk Esterase: x / RBC: x / WBC x   Sq Epi: x / Non Sq Epi: x / Bacteria: x              RADIOLOGY & ADDITIONAL TESTS:  Reviewed

## 2025-01-14 NOTE — PROGRESS NOTE ADULT - ATTENDING COMMENTS
Pt seen at bedside. in NAD, feels well overall. R hip pain controlled, 1/10 severity. Pt seen at bedside. in NAD, feels well overall. R hip pain controlled, 1/10 severity. Denies CP, SOB, abd pain, NV, fevers/chills. on PE: AO x3, no FND. +MMM, no JVD, s1s2 no murmur, CTA b/l lung fields, abd soft, NT. R KI and Abduction pillow in place. no LE edema.   -c/w home nifedipine. monitor BP. avoid overcorrection   -c/w ampicillin for likely VRE UTI f/up final cx. BCx ngtd.   -per ortho no intervention. c/w pain control.   -fall precautions.   -PT eval

## 2025-01-14 NOTE — PROGRESS NOTE ADULT - PROBLEM SELECTOR PROBLEM 7
Acute kidney injury superimposed on CKD Dislocation of left hip Right leg DVT Insulin dependent type 2 diabetes mellitus

## 2025-01-14 NOTE — OCCUPATIONAL THERAPY INITIAL EVALUATION ADULT - PERTINENT HX OF CURRENT PROBLEM, REHAB EVAL
MEDICAL SCREENING EXAM (MSE) NOTE      Kaci Hawley is a 50 year old female who presented to the ER today with fall.       Brief Physical Exam    Patient presents to the emergency room with a deformity to the left fourth finger after being pulled over furniture by her dog.  Patient also has a laceration to the inner upper lip.  States she did hit her head.  Denies LOC.  States she takes a baby aspirin.    This patient was screened by me for the purpose of initial evaluation.  I have screened the patient for an acute medical condition and have placed initial orders for the patient's diagnosis and treatment.       Marnie Wright, CNP  10/11/24 [id number]     74F with PMH of lung cancer (w/ mets to brain), CKD, HLD, DM2, RA (follows with Dr. Johnston), RLE DVT (off Eliquis d/t frequent falls), prior hospitalization for confusion, BIBEMS for AMS and unwitnessed fall, found to be hypertensive to 190s-200s in ED. Daughter (Chloe) relayed history via phone given patients AMS.

## 2025-01-14 NOTE — PROGRESS NOTE ADULT - PROBLEM SELECTOR PROBLEM 8
At risk for nutrition deficiency Insulin dependent type 2 diabetes mellitus Non-small cell lung cancer Hyperlipidemia

## 2025-01-14 NOTE — PROGRESS NOTE ADULT - PROBLEM SELECTOR PLAN 11
2/2 chemo vs. infection (+RSV 01/02)  - abx as above  - F/u RVP   - F/u uLegionella, uStrep, MRSA swab  - F/u BCx x2  - F/u UCx      #Rheumatoid arthritis  Follows with Dr. Johnston. Per chart review, intermittently on 2mg decadron during flares.  - CTM

## 2025-01-14 NOTE — PHYSICAL THERAPY INITIAL EVALUATION ADULT - ADDITIONAL COMMENTS
Pt reports living alone in an elevator building with no steps to enter/ Pt reports having HHA x 7 days for 3 hours a day assisting in ADLS. Pt ambulates with a RW and uses shower chair and grab bars.

## 2025-01-14 NOTE — OCCUPATIONAL THERAPY INITIAL EVALUATION ADULT - RANGE OF MOTION EXAMINATION
R hip flex NT past 90 degrees 2/2 precautions/no Active ROM deficits were identified/no Passive ROM deficits were identified

## 2025-01-14 NOTE — PROGRESS NOTE ADULT - PROBLEM SELECTOR PROBLEM 5
Hypoxia Non-small cell lung cancer Insulin dependent type 2 diabetes mellitus Dislocation of hip, right, closed

## 2025-01-14 NOTE — PROGRESS NOTE ADULT - SUBJECTIVE AND OBJECTIVE BOX
Internal Medicine Progress Note    **********************TRANSFER FROM [facility] TO [facility]************************    HOSPITAL COURSE: 74 year old female with PMHx of lung cancer (w/ mets to brain), CKD, HLD, DM2, RA (follows with Dr. Johnston), RLE DVT (off Eliquis d/t frequent falls), prior hospitalization for confusion, BIBEMS for AMS and unwitnessed fall, found to be hypertensive to 190s-200s and severely agitated in the ED. Received ativan 1mg x1, Lopressor 5mg IVP x1, 1L NS x1. XR Pelvis performed showed dislocation of the patient's right total hip arthroplasty with osseous. Orthopedic surgery was consulted and they performed a R hip reduction while in the ED, and pt was admitted to [facility] for closer monitoring. In the unit, SBP of 170 and pt wasn't able to tolerate PO so was given hydralazine 2.5mg IVP. Another dose of hydral 10mg IVP was given along with 1L LR over 3 hours. Standing hydral q6h ordered. Pt is s/p 1 straight cath now given BS >500cc, repeat BS this afternoon ~100cc. While in the unit, patient also passed dysphagia screen and was started on CC diet with evening snack. No further surgical intervention per ortho, appreciate further recs. Her altered mentation is greatly improved, she has been hemodynamically stable, and is thus ready for stepdown to [facility].     Subjective:     OBJECTIVE:  Vital Signs Last 24 Hrs  T(C): 37.3 (14 Jan 2025 18:06), Max: 37.6 (14 Jan 2025 09:35)  T(F): 99.1 (14 Jan 2025 18:06), Max: 99.6 (14 Jan 2025 09:35)  HR: 94 (14 Jan 2025 15:56) (76 - 96)  BP: 189/81 (14 Jan 2025 15:56) (165/67 - 220/84)  BP(mean): 127 (14 Jan 2025 15:56) (97 - 127)  RR: 18 (14 Jan 2025 15:56) (18 - 18)  SpO2: 98% (14 Jan 2025 15:56) (96% - 98%)    Parameters below as of 14 Jan 2025 15:56  Patient On (Oxygen Delivery Method): room air      I&O's Detail    14 Jan 2025 07:01  -  14 Jan 2025 18:33  --------------------------------------------------------  IN:  Total IN: 0 mL    OUT:    Intermittent Catheterization - Urethral (mL): 1200 mL  Total OUT: 1200 mL    Total NET: -1200 mL    PHYSICAL EXAM:  General: NAD, lying in bed  HEENT: PERRL, EOM intact, sclera anicteric  Cardiovascular: RRR; no MRG  Respiratory: CTAB; no WRR  GI/: soft; NTND  Extremities: WWP; 2+ peripheral pulses bilaterally; no LE edema; legs with abduction pillow  Skin: normal color & turgor; no rash noted  Neurologic: AOx2-3; no focal deficits    Medications:  MEDICATIONS  (STANDING):  ampicillin  IVPB 500 milliGRAM(s) IV Intermittent every 6 hours  atorvastatin 40 milliGRAM(s) Oral at bedtime  cefTRIAXone   IVPB 2000 milliGRAM(s) IV Intermittent every 24 hours  dextrose 5%. 1000 milliLiter(s) (50 mL/Hr) IV Continuous <Continuous>  dextrose 5%. 1000 milliLiter(s) (100 mL/Hr) IV Continuous <Continuous>  dextrose 50% Injectable 25 Gram(s) IV Push once  dextrose 50% Injectable 12.5 Gram(s) IV Push once  dextrose 50% Injectable 25 Gram(s) IV Push once  glucagon  Injectable 1 milliGRAM(s) IntraMuscular once  heparin   Injectable 5000 Unit(s) SubCutaneous every 8 hours  insulin glargine Injectable (LANTUS) 16 Unit(s) SubCutaneous at bedtime  insulin lispro (ADMELOG) corrective regimen sliding scale   SubCutaneous Before meals and at bedtime  insulin lispro Injectable (ADMELOG) 7 Unit(s) SubCutaneous three times a day before meals  NIFEdipine XL 60 milliGRAM(s) Oral every 24 hours    MEDICATIONS  (PRN):  acetaminophen     Tablet .. 650 milliGRAM(s) Oral every 6 hours PRN Temp greater or equal to 38C (100.4F), Mild Pain (1 - 3)  dextrose Oral Gel 15 Gram(s) Oral once PRN Blood Glucose LESS THAN 70 milliGRAM(s)/deciliter  ondansetron Injectable 4 milliGRAM(s) IV Push every 8 hours PRN Nausea and/or Vomiting      Labs:                        10.8   20.05 )-----------( 175      ( 14 Jan 2025 05:30 )             34.7     01-14    142  |  105  |  27[H]  ----------------------------<  146[H]  3.3[L]   |  23  |  2.04[H]    Ca    8.9      14 Jan 2025 05:30  Phos  2.8     01-14  Mg     2.2     01-14      PT/INR - ( 13 Jan 2025 19:00 )   PT: 13.9 sec;   INR: 1.19          PTT - ( 13 Jan 2025 19:00 )  PTT:27.0 sec    Urinalysis Basic - ( 14 Jan 2025 05:30 )    Color: x / Appearance: x / SG: x / pH: x  Gluc: 146 mg/dL / Ketone: x  / Bili: x / Urobili: x   Blood: x / Protein: x / Nitrite: x   Leuk Esterase: x / RBC: x / WBC x   Sq Epi: x / Non Sq Epi: x / Bacteria: x      SARS-CoV-2: NotDetec (05 Aug 2024 18:22)      Radiology: Reviewed

## 2025-01-14 NOTE — PROGRESS NOTE ADULT - ASSESSMENT
74F with PMH of lung cancer (w/ mets to brain), CKD, HLD, DM2, RA (follows with Dr. Johnston), RLE DVT (off Eliquis d/t frequent falls), prior hospitalization for confusion, BIBEMS for AMS and unwitnessed fall, found to be hypertensive to 190s-200s in ED.   74F with PMH of lung cancer (w/ mets to brain), CKD, HLD, DM2, RA (follows with Dr. Johnston), RLE DVT (off Eliquis d/t frequent falls), prior hospitalization for confusion, BIBEMS for AMS and unwitnessed fall, found to be hypertensive to 190s-200s in ED. c/c/b VRE UTI, on ampicillin. AMS improved and BP controlled with IV hydralazine, transitioned to po nifedipine.

## 2025-01-14 NOTE — PROGRESS NOTE ADULT - PROBLEM SELECTOR PLAN 12
XR pelvis: Dislocation of the patient's right total hip arthroplasty with osseous. The change at the lateral aspect of the iliac bone and acetabulum likely chronic in nature. The left hip is normal in appearance. Calcified uterine fibroid. Degenerative changes of the lumbar spine. Vascular calcification. No fractures.  Ortho consulted in ED, recs as follows:   - Closed reduction performed in ED, confirmed by post-reduction XRs   - KI and Abduction pillow  - No surgical intervention indicated  - Posterior hip precautions  - Recommend PT eval once mental status improves  - Possible rehab placement, pending PT eval  - Possible abduction orthosis fitting    #Prophylaxis  F: currently receiving 1L LR, s/p 1L NS in ED  E: replete K>4 and Mg > 2  N: Consistent carbohydrtae no snacks   GI: none  DVT: Heparin subq q8 given DUNIA on CKD  Dispo: [facility]

## 2025-01-14 NOTE — PROGRESS NOTE ADULT - PROBLEM SELECTOR PLAN 8
Mg 1.2, K 3.4, 1/14 note, likely 2/2 poor PO intake    Plan  - nutrition consult  - hypokalemia likely iso AoCKD miSS NSCLC - BMs s/p SRS, liver met.  Follows closely with Dr. Delaney, had appointment 12/10/24 and received gemcitabine. Per chart review: single agent gemcitabine with therapy, given on day 1 and day 8 of each 21-day cycle. "patient is on therapy with gemcitabine requiring intensive monitoring for toxicity such as cytopenias with CBC, CMP Q 2-3 wks"  - can consider heme/onc consult Home med atorvastatin 40mg qd.   - c/w home med

## 2025-01-14 NOTE — PROGRESS NOTE ADULT - PROBLEM SELECTOR PLAN 4
Home med atorvastatin 40mg qd. On exam, patient LLE seen to be cool to touch, pulse palpable. Possibly 2/2 vascular disease.   - c/w home med  - Dopplers b/l US negative for DV Per chart review, patient with hx of RLE DVT and has had swelling of BL LE. Eliquis was discontinued given frequent falls.  - Doppler negative for DVTs R pelvis: Dislocation of the patient's right total hip arthroplasty with osseous. The change at the lateral aspect of the iliac bone and acetabulum likely chronic in nature. The left hip is normal in appearance. Calcified uterine fibroid. Degenerative changes of the lumbar spine. Vascular calcification. No fractures.  Ortho consulted in ED, s/p closed reduction confirmed by post-reduction XRs   - KI and Abduction pillow  - No surgical intervention indicated  - Posterior hip precautions  - Possible abduction orthosis fitting Pt found down and confused at her apartment by HHA. Found to have R hip dislocation in ED.   Cause unclear but include sepsis vs hypertensive urgency/emergency vs orthostatic hypotension. Cardiac etiology less likely (no arrythmia seen while being monitored on tele, TTE   - f/u orthostatic vitals  - plan as below

## 2025-01-14 NOTE — OCCUPATIONAL THERAPY INITIAL EVALUATION ADULT - PLANNED THERAPY INTERVENTIONS, OT EVAL
ADL retraining/IADL retraining/balance training/bed mobility training/neuromuscular re-education/parent/caregiver training.../ROM/stretching/transfer training

## 2025-01-14 NOTE — OCCUPATIONAL THERAPY INITIAL EVALUATION ADULT - DIAGNOSIS, OT EVAL
Pt. admitted to [facility] s/p mechanical fall, + r hip dislocation, and hyptertensive episode. Pt. is s/p hip relocation in ER, now WBAT w. KI and posterior precautions. Upon assessment, pt. demo impaired weight shifting, balance and strength impacting engagement in ADLs and functional mob/transfers.

## 2025-01-14 NOTE — PROGRESS NOTE ADULT - PROBLEM SELECTOR PLAN 3
Home med atorvastatin 40mg qd. On exam, patient LLE seen to be cool to touch, pulse palpable. Possibly 2/2 vascular disease.   - c/w home med  - Dopplers b/l US negative for DVT

## 2025-01-14 NOTE — PROGRESS NOTE ADULT - PROBLEM SELECTOR PLAN 9
miSS Home med atorvastatin 40mg qd. On exam, patient LLE seen to be cool to touch, pulse palpable. Possibly 2/2 vascular disease.   - c/w home med  - Dopplers b/l US negative for DV Follows with Dr. Johnston. Per chart review, intermittently on 2mg decadron during flares.  - CTM Per chart review, patient with hx of RLE DVT and has had swelling of BL LE. Eliquis was discontinued given frequent falls.  - Doppler negative for DVTs

## 2025-01-14 NOTE — PROGRESS NOTE ADULT - PROBLEM SELECTOR PLAN 9
Follows closely with Dr. Delaney, had appointment 12/10/24 and received gemcitabine. Per chart review: single agent gemcitabine with therapy, given on day 1 and day 8 of each 21-day cycle. "patient is on therapy with gemcitabine requiring intensive monitoring for toxicity such as cytopenias with CBC, CMP Q 2-3 wks"  - can consider heme/onc consult

## 2025-01-14 NOTE — PROGRESS NOTE ADULT - PROBLEM SELECTOR PLAN 2
R pelvis: Dislocation of the patient's right total hip arthroplasty with osseous. The change at the lateral aspect of the iliac bone and acetabulum likely chronic in nature. The left hip is normal in appearance. Calcified uterine fibroid. Degenerative changes of the lumbar spine. Vascular calcification. No fractures.  Ortho consulted in ED, recs as follows:   - Closed reduction performed in ED, confirmed by post-reduction XRs   - KI and Abduction pillow  - No surgical intervention indicated  - Posterior hip precautions  - Recommend PT eval once mental status improves  - Possible rehab placement, pending PT eval  - Possible abduction orthosis fitting U/A: turbid, spec grav 1.015, Protein 300, Nitrite negative, Large LE, pH 6.0, , RBC 5, Many bacteria  Hx of recurrent GNR UTIs with MDR. Has had VRE sensitive to ampicillin (10/2024) in the past as well Klebsiella sensitive to CTX (07/2024), Klebsiella sensitive only to carbapenems (12/2023). Now with leukocytosis and + u/a.   - ampicillin 500mg q6h  - CTX 1g qd  - f/u UCx and sensitivities  - trend WBC U/A: turbid, spec grav 1.015, Protein 300, Nitrite negative, Large LE, pH 6.0, , RBC 5, Many bacteria  Hx of recurrent GNR UTIs with MDR. Has had VRE sensitive to ampicillin (10/2024) in the past as well Klebsiella sensitive to CTX (07/2024), Klebsiella sensitive only to carbapenems (12/2023). Now with leukocytosis and + u/a.   - c/w ampicillin 500mg q6h  - c/w CTX 1g qd  - f/u UCx and sensitivities  - trend WBC U/A: turbid, spec grav 1.015, Protein 300, Nitrite negative, Large LE, pH 6.0, , RBC 5, Many bacteria  Hx of recurrent GNR UTIs with MDR. Has had VRE sensitive to ampicillin (10/2024) in the past as well Klebsiella sensitive to CTX (07/2024), Klebsiella sensitive only to carbapenems (12/2023). Now with leukocytosis and + u/a.   UCx: VRE sensitive to ampicillin  - c/w ampicillin 500mg q6h  - c/w CTX 2g qd   - f/u UCx and sensitivities  - trend WBC Patient met sepsis criteria on admission: 2/4 SIRS criteria (WBC >12, HR >90) + UTI source. s/p 2L fluids.  - 1/13 BCx NGTD  - rest of plan as below

## 2025-01-14 NOTE — PHYSICAL THERAPY INITIAL EVALUATION ADULT - PERTINENT HX OF CURRENT PROBLEM, REHAB EVAL
74 year old female with PMHx of lung cancer (w/ mets to brain), CKD, HLD, DM2, RA (follows with Dr. Johnston), RLE DVT (off Eliquis d/t frequent falls), prior hospitalization for confusion, BIBEMS for AMS and unwitnessed fall, found to be hypertensive to 190s-200s and severely agitated in the ED. Received ativan 1mg x1, Lopressor 5mg IVP x1, 1L NS x1. XR Pelvis performed showed dislocation of the patient's right total hip arthroplasty with osseous. Orthopedic surgery was consulted and they performed a R hip reduction while in the ED, and pt was admitted to [facility] for closer monitoring. In the unit, SBP of 170 and pt wasn't able to tolerate PO so was given hydralazine 2.5mg IVP. Another dose of hydral 10mg IVP was given along with 1L LR over 3 hours. Standing hydral q6h ordered. Pt is s/p 1 straight cath now given BS >500cc, repeat BS this afternoon ~100cc. While in the unit, patient also passed dysphagia screen and was started on CC diet with evening snack. No further surgical intervention per ortho, appreciate further recs. Her altered mentation is greatly improved, she has been hemodynamically stable, and is thus ready for stepdown to [facility].

## 2025-01-14 NOTE — PROGRESS NOTE ADULT - PROBLEM SELECTOR PLAN 4
91% on RA on admission, now satting at 96-97% on RA, seen to be breathing comfortably, with CTABL and CXR overall clear.  - consider POCUS to r/o effusion/infiltrate in LLL  - given AMS and possible dysphagia, low threshold to start PNA coverage   - f/u full RVP

## 2025-01-14 NOTE — PROGRESS NOTE ADULT - PROBLEM SELECTOR PLAN 5
91% on RA on admission, now satting at 96-97% on RA, seen to be breathing comfortably, with CTABL and CXR overall clear.  - consider POCUS to r/o effusion/infiltrate in LLL  - given AMS and possible dysphagia, low threshold to start PNA coverage   - f/u full RVP NSCLC - BMs s/p SRS, liver met.  Follows closely with Dr. Delaney, had appointment 12/10/24 and received gemcitabine. Per chart review: single agent gemcitabine with therapy, given on day 1 and day 8 of each 21-day cycle. "patient is on therapy with gemcitabine requiring intensive monitoring for toxicity such as cytopenias with CBC, CMP Q 2-3 wks"  - can consider heme/onc consult A1c 9.0.   - miSS R pelvis: Dislocation of the patient's right total hip arthroplasty with osseous. The change at the lateral aspect of the iliac bone and acetabulum likely chronic in nature. The left hip is normal in appearance. Calcified uterine fibroid. Degenerative changes of the lumbar spine. Vascular calcification. No fractures.  Ortho consulted in ED, s/p closed reduction confirmed by post-reduction XRs   - No surgical intervention indicated  - KI and Abduction pillow  - Posterior hip precautions  - Possible abduction orthosis fitting  - pain mgmt: Tylenol PRN

## 2025-01-14 NOTE — PROGRESS NOTE ADULT - PROBLEM SELECTOR PLAN 6
U/A: turbid, spec grav 1.015, Protein 300, Nitrite negative, Large LE, pH 6.0, , RBC 5, Many bacteria  Hx of recurrent GNR UTIs with MDR. Has had VRE sensitive to ampicillin (10/2024) in the past as well Klebsiella sensitive to CTX (07/2024), Klebsiella sensitive only to carbapenems (12/2023). Now with leukocytosis and + u/a.   - ampicillin 500mg q6h  - CTX 1g qd  - f/u UCx and sensitivities  - trend WBC

## 2025-01-14 NOTE — OCCUPATIONAL THERAPY INITIAL EVALUATION ADULT - GENERAL OBSERVATIONS, REHAB EVAL
OT IE complete. SOSA Jimenez clearing pt. for session. PTs Destini and Susan present. Pt. received semi-supine in bed, +primafit, +tele, +RLE in KI, +ABD pillow, +heplock in NAD, agreeable to therapy session

## 2025-01-14 NOTE — PROGRESS NOTE ADULT - PROBLEM SELECTOR PLAN 6
U/A: turbid, spec grav 1.015, Protein 300, Nitrite negative, Large LE, pH 6.0, , RBC 5, Many bacteria  Hx of recurrent GNR UTIs with MDR. Has had VRE sensitive to ampicillin (10/2024) in the past as well Klebsiella sensitive to CTX (07/2024), Klebsiella sensitive only to carbapenems (12/2023). Now with leukocytosis and + u/a.   - ampicillin 500mg q6h  - CTX 1g qd  - f/u UCx and sensitivities  - trend WBC Follows with Dr. Johnston. Per chart review, intermittently on 2mg decadron during flares.  - CTM Home med atorvastatin 40mg qd.   - c/w home med Initial Cr 2.21 (presumed baseline: 1.18-1.32 (08-10/2024). Possibly pre-renal (iso sepsis) vs post-renal (urinary retention on admission).  s/p 2L fluids.   - f/u uLytes  - q6h bladder scans    #Hypoxia, RESOLVED.  91% on RA on admission, now satting at 96-97% on RA, seen to be breathing comfortably, with CTABL and CXR overall clear. RVP neg.

## 2025-01-14 NOTE — PROGRESS NOTE ADULT - PROBLEM SELECTOR PLAN 5
Patient too altered to receive PO meds in ED. Also found down. Has no known hx of dysphagia otherwise. Abdomen on admission with TTP in LUQ.   - passed bedside dysphagia screening  - start CC diet with no snacks  - pending official abdomen XR read  --- appears to have stool burden   --- start suppository as pt can't tolerate PO

## 2025-01-14 NOTE — PROGRESS NOTE ADULT - PROBLEM SELECTOR PLAN 7
Initial Cr 2.21 (presumed baseline: 1.18-1.32 (08-10/2024)  - f/u uLytes  - q6h bladder scans - so far no concern of retention  - s/p LR bolus 1L

## 2025-01-14 NOTE — PROGRESS NOTE ADULT - PROBLEM SELECTOR PLAN 3
Goal SBP: 170s overnight, 150 by 01/14 5PM  Home meds per superscripts: lisinopril 10mg qd, nifedipine er 60mg qd. Per outpatient chart review Dec 2024, was meant to stop nifedipine & lisinopril and start amlodipine 10mg; does not appear patient did so.  TTE 7/2023: EF 60-65%, Grade I Diastolic Dysfunction  Changes consistent with LVH noted on EKG, LVH noted on prior echo.  - Holding home lisinopril and amlodipine/nifedipine for inability to tolerate PO  - s/p lopressor 5mg IVP in ED  - SBP 170s on arrival to [facility], MAP 120s   --- given hydral 10 + 2.5 IVP when SBP increased to 220s overnight  - start hydralazine 5mg q6h for SBP >160  - TTE complete: There is moderate-to-severe concentric left ventricular hypertrophy. Left ventricular ejection fraction is 55-60%. Trace AR, mild MR, mild MO Initial Cr 2.21 (presumed baseline: 1.18-1.32 (08-10/2024)  - f/u uLytes  - q6h bladder scans - so far no concern of retention  - s/p LR bolus 1L Hx of recurrent GNR UTIs with MDR. Has had VRE sensitive to ampicillin (10/2024) in the past as well Klebsiella sensitive to CTX (07/2024), Klebsiella sensitive only to carbapenems (12/2023). Now with leukocytosis and + u/a.   UCx: >100k Enterococcus    - c/w ampicillin 500mg q6h  - c/w CTX 2g qd   - f/u UCx and sensitivities  - trend WBC

## 2025-01-14 NOTE — PROGRESS NOTE ADULT - PROBLEM SELECTOR PLAN 1
#Lung Ca with Brain Mets   #Metabolic encephalopathy     2/2 infection vs. HTN vs trauma vs progression of mets  CTH in ED w/o acute abnormality.   Has presented prior with confusion, was discharged to [facility] 3hrs/day x 7 days per wk starter per chart review.   - consider Hemeonc consult in AM  - mgmt. of HTN as below  - mgmt. of infection as below  - delirium and fall precautions  - required lorazepam 1mg in ED, can give haldol vs zyprexa while pt unable to tolerate PO if more agitation not redirectable

## 2025-01-14 NOTE — OCCUPATIONAL THERAPY INITIAL EVALUATION ADULT - ADDITIONAL COMMENTS
pt. resides alone in an elevator accessible building where she was I in ADLs, used a rollator, shower chair, grab bars and a commode. Patient has aide services 7D X 3H to aide in ADLs

## 2025-01-14 NOTE — PROGRESS NOTE ADULT - PROBLEM SELECTOR PLAN 2
#HTN  #LVH  SBP 200s  Goal SBP: 170s overnight, 150 by 01/14 5PM  Home meds per superscripts: lisinopril 10mg qd, nifedipine er 60mg qd. Per outpatient chart review Dec 2024, was meant to stop nifedipine & lisinopril and start amlodipine 10mg; does not appear patient did so.  TTE 7/2023: EF 60-65%, Grade I Diastolic Dysfunction  Changes consistent with LVH noted on EKG, LVH noted on prior echo.  - Holding home lisinopril and amlodipine/nifedipine for inability to tolerate PO  - s/p lopressor 5mg IVP in ED  - SBP 170s on arrival to [facility], MAP 120s   --- given hydral 10 + 2.5 IVP when SBP increased to 220s overnight  - start hydralazine 5mg q6h for SBP >160  - TTE complete: There is moderate-to-severe concentric left ventricular hypertrophy. Left ventricular ejection fraction is 55-60%. Trace AR, mild MR, mild MD

## 2025-01-14 NOTE — PROGRESS NOTE ADULT - SUBJECTIVE AND OBJECTIVE BOX
INTERVAL/OVERNIGHT EVENTS: None    SUBJECTIVE:  Patient seen and examined at bedside, comfortable, NAD. Denied fever, chest pain, dyspnea, abdominal pain.     Vital Signs Last 12 Hrs  T(F): 98.4 (01-14-25 @ 05:06), Max: 98.4 (01-14-25 @ 03:32)  HR: 88 (01-14-25 @ 06:45) (76 - 96)  BP: 165/71 (01-14-25 @ 06:45) (165/71 - 220/84)  BP(mean): 102 (01-14-25 @ 06:45) (102 - 120)  RR: 18 (01-14-25 @ 03:32) (18 - 18)  SpO2: 96% (01-14-25 @ 03:32) (96% - 97%)  I&O's Summary    14 Jan 2025 07:01  -  14 Jan 2025 07:13  --------------------------------------------------------  IN: 0 mL / OUT: 1200 mL / NET: -1200 mL    PHYSICAL EXAM:  General: NAD  HEENT: PERRL, EOM intact, sclera anicteric, MMM  Cardiovascular: RRR; no MRG;   Respiratory: CTAB; no WRR  GI/: soft; NTND; BS x4  Extremities: WWP; 2+ peripheral pulses bilaterally; no LE edema  Skin: normal color & turgor; no rash  Neurologic: aox3; no focal deficits    LABS:                        12.2   23.33 )-----------( 190      ( 13 Jan 2025 16:51 )             38.3     01-14    140  |  103  |  26[H]  ----------------------------<  142[H]  3.1[L]   |  22  |  1.95[H]    Ca    8.8      14 Jan 2025 01:47  Phos  2.9     01-14  Mg     3.1     01-14      PT/INR - ( 13 Jan 2025 19:00 )   PT: 13.9 sec;   INR: 1.19       PTT - ( 13 Jan 2025 19:00 )  PTT:27.0 sec  Urinalysis Basic - ( 14 Jan 2025 01:47 )    Color: x / Appearance: x / SG: x / pH: x  Gluc: 142 mg/dL / Ketone: x  / Bili: x / Urobili: x   Blood: x / Protein: x / Nitrite: x   Leuk Esterase: x / RBC: x / WBC x   Sq Epi: x / Non Sq Epi: x / Bacteria: x    RADIOLOGY & ADDITIONAL TESTS:    MEDICATIONS  (STANDING):  ampicillin  IVPB 500 milliGRAM(s) IV Intermittent every 6 hours  cefTRIAXone   IVPB 1000 milliGRAM(s) IV Intermittent every 24 hours  dextrose 5%. 1000 milliLiter(s) (50 mL/Hr) IV Continuous <Continuous>  dextrose 5%. 1000 milliLiter(s) (100 mL/Hr) IV Continuous <Continuous>  dextrose 50% Injectable 25 Gram(s) IV Push once  dextrose 50% Injectable 12.5 Gram(s) IV Push once  dextrose 50% Injectable 25 Gram(s) IV Push once  glucagon  Injectable 1 milliGRAM(s) IntraMuscular once  heparin   Injectable 5000 Unit(s) SubCutaneous every 8 hours  hydrALAZINE Injectable 5 milliGRAM(s) IV Push every 6 hours  insulin lispro (ADMELOG) corrective regimen sliding scale   SubCutaneous Before meals and at bedtime  potassium chloride  10 mEq/100 mL IVPB 10 milliEquivalent(s) IV Intermittent every 1 hour    MEDICATIONS  (PRN):  acetaminophen     Tablet .. 650 milliGRAM(s) Oral every 6 hours PRN Temp greater or equal to 38C (100.4F), Mild Pain (1 - 3)  dextrose Oral Gel 15 Gram(s) Oral once PRN Blood Glucose LESS THAN 70 milliGRAM(s)/deciliter  ondansetron Injectable 4 milliGRAM(s) IV Push every 8 hours PRN Nausea and/or Vomiting   **********************TRANSFER FROM [facility] TO [facility]************************    HOSPITAL COURSE: 74 year old female with PMHx of lung cancer (w/ mets to brain), CKD, HLD, DM2, RA (follows with Dr. Johnston), RLE DVT (off Eliquis d/t frequent falls), prior hospitalization for confusion, BIBEMS for AMS and unwitnessed fall, found to be hypertensive to 190s-200s and severely agitated in the ED. Received ativan 1mg x1, Lopressor 5mg IVP x1, 1L NS x1. XR Pelvis performed showed dislocation of the patient's right total hip arthroplasty with osseous. Orthopedic surgery was consulted and they performed a R hip reduction while in the ED, and pt was admitted to [facility] for closer monitoring. In the unit, SBP of 170 and pt wasn't able to tolerate PO so was given hydralazine 2.5mg IVP. Another dose of hydral 10mg IVP was given along with 1L LR over 3 hours. Standing hydral q6h ordered. Pt is s/p 1 straight cath now given BS >500cc, repeat BS this afternoon ~100cc. While in the unit, patient also passed dysphagia screen and was started on CC diet with evening snack. No further surgical intervention per ortho, appreciate further recs. Her altered mentation is greatly improved, she has been hemodynamically stable, and is thus ready for stepdown to [facility].     INTERVAL/OVERNIGHT EVENTS:  on arrival to [facility] and wasn't able to tolerate PO. Hydralazine 2.5mg IVP given and  again. Hydralazine 10mg IVP given. Given 1L LR over 3hrs. Retaining >500cc at 6am, straight cath x1. Started on heparin    SUBJECTIVE:  Patient seen and examined at bedside, comfortable, NAD. She states that she feels a little weak but doesn't endorse any acute complaints at this time.    Vital Signs Last 12 Hrs  T(F): 98.4 (01-14-25 @ 05:06), Max: 98.4 (01-14-25 @ 03:32)  HR: 88 (01-14-25 @ 06:45) (76 - 96)  BP: 165/71 (01-14-25 @ 06:45) (165/71 - 220/84)  BP(mean): 102 (01-14-25 @ 06:45) (102 - 120)  RR: 18 (01-14-25 @ 03:32) (18 - 18)  SpO2: 96% (01-14-25 @ 03:32) (96% - 97%)  I&O's Summary    14 Jan 2025 07:01  -  14 Jan 2025 07:13  --------------------------------------------------------  IN: 0 mL / OUT: 1200 mL / NET: -1200 mL    PHYSICAL EXAM:  General: NAD, lying in bed  HEENT: PERRL, EOM intact, sclera anicteric  Cardiovascular: RRR; no MRG  Respiratory: CTAB; no WRR  GI/: soft; NTND  Extremities: WWP; 2+ peripheral pulses bilaterally; no LE edema; legs with abduction pillow  Skin: normal color & turgor; no rash noted  Neurologic: AOx2-3; no focal deficits    LABS:                        12.2   23.33 )-----------( 190      ( 13 Jan 2025 16:51 )             38.3     01-14    140  |  103  |  26[H]  ----------------------------<  142[H]  3.1[L]   |  22  |  1.95[H]    Ca    8.8      14 Jan 2025 01:47  Phos  2.9     01-14  Mg     3.1     01-14      PT/INR - ( 13 Jan 2025 19:00 )   PT: 13.9 sec;   INR: 1.19       PTT - ( 13 Jan 2025 19:00 )  PTT:27.0 sec  Urinalysis Basic - ( 14 Jan 2025 01:47 )    Color: x / Appearance: x / SG: x / pH: x  Gluc: 142 mg/dL / Ketone: x  / Bili: x / Urobili: x   Blood: x / Protein: x / Nitrite: x   Leuk Esterase: x / RBC: x / WBC x   Sq Epi: x / Non Sq Epi: x / Bacteria: x    RADIOLOGY & ADDITIONAL TESTS:    MEDICATIONS  (STANDING):  ampicillin  IVPB 500 milliGRAM(s) IV Intermittent every 6 hours  cefTRIAXone   IVPB 1000 milliGRAM(s) IV Intermittent every 24 hours  dextrose 5%. 1000 milliLiter(s) (50 mL/Hr) IV Continuous <Continuous>  dextrose 5%. 1000 milliLiter(s) (100 mL/Hr) IV Continuous <Continuous>  dextrose 50% Injectable 25 Gram(s) IV Push once  dextrose 50% Injectable 12.5 Gram(s) IV Push once  dextrose 50% Injectable 25 Gram(s) IV Push once  glucagon  Injectable 1 milliGRAM(s) IntraMuscular once  heparin   Injectable 5000 Unit(s) SubCutaneous every 8 hours  hydrALAZINE Injectable 5 milliGRAM(s) IV Push every 6 hours  insulin lispro (ADMELOG) corrective regimen sliding scale   SubCutaneous Before meals and at bedtime  potassium chloride  10 mEq/100 mL IVPB 10 milliEquivalent(s) IV Intermittent every 1 hour    MEDICATIONS  (PRN):  acetaminophen     Tablet .. 650 milliGRAM(s) Oral every 6 hours PRN Temp greater or equal to 38C (100.4F), Mild Pain (1 - 3)  dextrose Oral Gel 15 Gram(s) Oral once PRN Blood Glucose LESS THAN 70 milliGRAM(s)/deciliter  ondansetron Injectable 4 milliGRAM(s) IV Push every 8 hours PRN Nausea and/or Vomiting

## 2025-01-14 NOTE — OCCUPATIONAL THERAPY INITIAL EVALUATION ADULT - MODIFIED CLINICAL TEST OF SENSORY INTEGRATION IN BALANCE TEST
pt. tolerated dangle at EOB ~10 minutes w. SBA, no LOB Noted. Pt. tolerated stand ~2 minutes for pericare w. Min Ax2, unable to weight shift to engage in functional mob at this time

## 2025-01-14 NOTE — PROGRESS NOTE ADULT - PROBLEM SELECTOR PLAN 10
Per chart review, patient with hx of RLE DVT and has had swelling of BL LE. Eliquis was discontinued given frequent falls.  - Doppler negative for DVTs

## 2025-01-14 NOTE — PROGRESS NOTE ADULT - PROBLEM SELECTOR PLAN 12
F: currently receiving 1L LR, s/p 1L NS in ED  E: replete K>4 and Mg > 2  N: NPO, pending dysphagia screening  GI: none  DVT: Heparin subq q8 given DUNIA on CKD  Dispo: [facility] F: none currently, s/p 2L  E: replete K>4 and Mg > 2  N: CC  GI: none  DVT: Heparin subq q8 given DUNIA on CKD  Dispo: [facility]; PT/OT - SULMA

## 2025-01-14 NOTE — PROGRESS NOTE ADULT - ASSESSMENT
74F with PMH of lung cancer (w/ mets to brain), CKD, HLD, DM2, RA (follows with Dr. Johnston), RLE DVT (off Eliquis d/t frequent falls), prior hospitalization for confusion, BIBEMS for AMS and unwitnessed fall, found to be hypertensive to 190s-200s in ED.    NEURO:  #AMS  #Lung Ca with Brain Mets  #Metabolic encephalopathy  2/2 infection vs. HTN vs trauma vs progression of mets  CTH in ED w/o acute abnormality.   Has presented prior with confusion, was discharged to [facility] and [facility] 3hrs/day x 7 days per wk starter per chart review.   - consider Hemeonc consult in AM  - mgmt of HTN as below  - mgmt of infection as below  - delirium and fall precautions  - required lorazepam 1mg in ED, can give haldol vs zyprexa while pt unable to tolerate PO if more agitation not redirectable    CARDS:  #Hypertensive Urgency/Emergency  #HTN  #LVH  SBP 200s  Goal SBP: 170s overnight, 150 by 01/14 5PM  Home meds per superscripts: lisinopril 10mg qd, nifedipine er 60mg qd. Per outpatient chart review Dec 2024, was meant to stop nifedipine & lisinopril and start amlodipine 10mg; does not appear patient did so.  TTE 7/2023: EF 60-65%, Grade I Diastolic Dysfunction  Changes consistent with LVH noted on EKG, LVH noted on prior echo.  - Holding home lisinopril and amlodipine/nifedipine for inability to tolerate PO  - s/p lopressor 5mg IVP in ED  - SBP 170s on arrival to [facility], MAP 120s.   --- given hydral 10 + 2.5 IVP when SBP increased to 220s overnight  - start hydralazine 5mg q6 PRN for SBP >160  - f/u TTE complete      #HLD  Home med atorvastatin 40mg qd. On exam, patient LLE seen to be cool to touch, pulse palpable. Possibly 2/2 vascular disease.   - restart pending S&S recs  - obtain Dopplers b/l    PULM:  #Hypoxia  91% on RA on admission, now satting at 96-97% on RA, seen to be breathing comfortably, with CTABL and CXR overall clear.  - consider POCUS to r/o effusion/infiltrate in LLL  - given AMS and possible dysphagia, low threshold to start PNA coverage   - f/u full RVP    GI:  #r/o dysphagia  #LUQ Abdominal TTP  Patient too altered to receive PO meds in ED. Also found down. Has no known hx of dysphagia otherwise. Abdomen on admission with TTP in LUQ.   - pending bedside dysphagia screening  - S&S assessment  - NPO until then  - pending official abd xr read  --- appears to have stool burden   --- start suppository as pt can't tolerate PO    Renal/:  #UTI  U/A: turbid, spec grav 1.015, Protein 300, Nitrite negative, Large LE, pH 6.0, , RBC 5, Many bacteria  Hx of recurrent GNR UTIs with MDR. Has had VRE sensitive to ampicillin (10/2024) in the past as well Klebsiella sensitive to CTX (07/2024), Klebsiella sensitive only to carbapenems (12/2023). Now with leukocytosis and + u/a.   - ampicillin 500mg q6  - CTX 1g qd  - f/u UCx and sensitivities  - trend WBC    #DUNIA on CKD  Initial Cr 2.21 (presumed baseline: 1.18-1.32 (08-10/2024)  - f/u uLytes  - q6h bladder scans  - give LR bolus 1L     #Hypomagnesemia  #Hypokalemia  Mg 1.2, K 3.4, likely 2/2 poor PO intake  - replete Mg  - do not replete K iso DUNIA on CKD  - nutrition consult    ENDO:  #IDDM  - mISS    HEME/ONC:  #NSCLC - BMs s/p SRS, liver met.  Follows closely with Dr. Delaney, had appointment 12/10/24 and received gemcitabine. Per chart review: single agent gemcitabine with therapy, given on day 1 and day 8 of each 21-day cycle. "patient is on therapy with gemcitabine requiring intensive monitoring for toxicity such as cytopenias with CBC, CMP Q 2-3 wks"  - Heme/onc consult in AM    #Hx of RLE DVT  Per chart review, patient with hx of RLE DVT and has had swelling of BL LE. Eliquis was discontinued given frequent falls.  - f/u BL LE doppler      ID:  #Leukocytosis  2/2 chemo vs. infection (+RSV 01/02)  - F/u RVP   - F/u uLegionella, uStrep, MRSA swab  - F/u BCx x2  - F/u UCx    #UTI  - treatment as above    MSK:  #Hip dislocation  XR pelvis: Dislocation of the patient's right total hip arthroplasty with osseous. The change at the lateral aspect of the iliac bone and acetabulum likely chronic in nature. The left hip is normal in appearance. Calcified uterine fibroid. Degenerative changes of the lumbar spine. Vascular calcification. No fractures.  Ortho consulted in ED, recs as follows:   - Closed reduction performed in ED, confirmed by post-reduction XRs   - KI and Abduction pillow  - No surgical intervention indicated  - Posterior hip precautions  - Recommend PT eval once mental status improves  - Possible rehab placement, pending PT eval  - Possible abduction orthosis fitting    #Rheumatoid arthritis  Follows with Dr. Johnston. Per chart review, intermittently on 2mg decadron during flares.  - ctm    PPX:  F: currently receiving 1L LR, s/p 1L NS in ED  E: replete Mg > 2  N: NPO, pending dysphagia screening  GI: --  DVT: Heparin subq q8 given DUNIA on CKD  Dispo: 7LA 74F with PMH of lung cancer (w/ mets to brain), CKD, HLD, DM2, RA (follows with Dr. Johnston), RLE DVT (off Eliquis d/t frequent falls), prior hospitalization for confusion, BIBEMS for AMS and unwitnessed fall, found to be hypertensive to 190s-200s in ED.    NEURO:  #AMS  #Lung Ca with Brain Mets  #Metabolic encephalopathy  2/2 infection vs. HTN vs trauma vs progression of mets  CTH in ED w/o acute abnormality.   Has presented prior with confusion, was discharged to [facility] and [facility] 3hrs/day x 7 days per wk starter per chart review.   - consider Hemeonc consult in AM  - mgmt. of HTN as below  - mgmt. of infection as below  - delirium and fall precautions  - required lorazepam 1mg in ED, can give haldol vs zyprexa while pt unable to tolerate PO if more agitation not redirectable    CARDS:  #Hypertensive Urgency/Emergency  #HTN  #LVH  SBP 200s  Goal SBP: 170s overnight, 150 by 01/14 5PM  Home meds per superscripts: lisinopril 10mg qd, nifedipine er 60mg qd. Per outpatient chart review Dec 2024, was meant to stop nifedipine & lisinopril and start amlodipine 10mg; does not appear patient did so.  TTE 7/2023: EF 60-65%, Grade I Diastolic Dysfunction  Changes consistent with LVH noted on EKG, LVH noted on prior echo.  - Holding home lisinopril and amlodipine/nifedipine for inability to tolerate PO  - s/p lopressor 5mg IVP in ED  - SBP 170s on arrival to [facility], MAP 120s   --- given hydral 10 + 2.5 IVP when SBP increased to 220s overnight  - start hydralazine 5mg q6h for SBP >160  - TTE complete: There is moderate-to-severe concentric left ventricular hypertrophy. Left ventricular ejection fraction is 55-60%. Trace AR, mild MR, mild ME    #HLD  Home med atorvastatin 40mg qd. On exam, patient LLE seen to be cool to touch, pulse palpable. Possibly 2/2 vascular disease.   - c/w home med  - Dopplers b/l US negative for DVT    PULM:  #Hypoxia  91% on RA on admission, now satting at 96-97% on RA, seen to be breathing comfortably, with CTABL and CXR overall clear.  - consider POCUS to r/o effusion/infiltrate in LLL  - given AMS and possible dysphagia, low threshold to start PNA coverage   - f/u full RVP    GI:  #r/o dysphagia  #LUQ Abdominal TTP  Patient too altered to receive PO meds in ED. Also found down. Has no known hx of dysphagia otherwise. Abdomen on admission with TTP in LUQ.   - passed bedside dysphagia screening  - start CC diet with no snacks  - pending official abdomen XR read  --- appears to have stool burden   --- start suppository as pt can't tolerate PO    Renal/:  #UTI  U/A: turbid, spec grav 1.015, Protein 300, Nitrite negative, Large LE, pH 6.0, , RBC 5, Many bacteria  Hx of recurrent GNR UTIs with MDR. Has had VRE sensitive to ampicillin (10/2024) in the past as well Klebsiella sensitive to CTX (07/2024), Klebsiella sensitive only to carbapenems (12/2023). Now with leukocytosis and + u/a.   - ampicillin 500mg q6h  - CTX 1g qd  - f/u UCx and sensitivities  - trend WBC    #DUNIA on CKD  Initial Cr 2.21 (presumed baseline: 1.18-1.32 (08-10/2024)  - f/u uLytes  - q6h bladder scans - so far no concern of retention  - s/p LR bolus 1L     #Hypomagnesemia  #Hypokalemia  Mg 1.2, K 3.4, likely 2/2 poor PO intake  - replete Mg  - do not replete K iso DUNIA on CKD  - nutrition consult    ENDO:  #IDDM  - mISS    HEME/ONC:  #NSCLC - BMs s/p SRS, liver met.  Follows closely with Dr. Delaney, had appointment 12/10/24 and received gemcitabine. Per chart review: single agent gemcitabine with therapy, given on day 1 and day 8 of each 21-day cycle. "patient is on therapy with gemcitabine requiring intensive monitoring for toxicity such as cytopenias with CBC, CMP Q 2-3 wks"  - can consider heme/onc consult    #Hx of RLE DVT  Per chart review, patient with hx of RLE DVT and has had swelling of BL LE. Eliquis was discontinued given frequent falls.  - Doppler negative for DVTs    ID:  #Leukocytosis  2/2 chemo vs. infection (+RSV 01/02)  - abx as above  - F/u RVP   - F/u uLegionella, uStrep, MRSA swab  - F/u BCx x2  - F/u UCx    #UTI  - treatment as above    MSK:  #Hip dislocation  XR pelvis: Dislocation of the patient's right total hip arthroplasty with osseous. The change at the lateral aspect of the iliac bone and acetabulum likely chronic in nature. The left hip is normal in appearance. Calcified uterine fibroid. Degenerative changes of the lumbar spine. Vascular calcification. No fractures.  Ortho consulted in ED, recs as follows:   - Closed reduction performed in ED, confirmed by post-reduction XRs   - KI and Abduction pillow  - No surgical intervention indicated  - Posterior hip precautions  - Recommend PT eval once mental status improves  - Possible rehab placement, pending PT eval  - Possible abduction orthosis fitting    #Rheumatoid arthritis  Follows with Dr. Johnston. Per chart review, intermittently on 2mg decadron during flares.  - CTM    PPX:  F: currently receiving 1L LR, s/p 1L NS in ED  E: replete K>4 and Mg > 2  N: NPO, pending dysphagia screening  GI: none  DVT: Heparin subq q8 given DUNIA on CKD  Dispo: [facility] --> [facility]

## 2025-01-14 NOTE — PROGRESS NOTE ADULT - PROBLEM SELECTOR PLAN 11
Follows with Dr. Johnston. Per chart review, intermittently on 2mg decadron during flares.  - CTM Mg 1.2, K 3.4, 1/14 note, likely 2/2 poor PO intake    Plan  - nutrition consult  - hypokalemia likely iso AoCKD RESOLVED.  91% on RA on admission, now satting at 96-97% on RA, seen to be breathing comfortably, with CTABL and CXR overall clear. RVP neg.

## 2025-01-14 NOTE — PROGRESS NOTE ADULT - PROBLEM SELECTOR PLAN 1
NSCLC - BMs s/p SRS, liver met.  Follows closely with Dr. Delaney, had appointment 12/10/24 and received gemcitabine. Per chart review: single agent gemcitabine with therapy, given on day 1 and day 8 of each 21-day cycle. "patient is on therapy with gemcitabine requiring intensive monitoring for toxicity such as cytopenias with CBC, CMP Q 2-3 wks"  - can consider heme/onc consult Goal SBP: 170s overnight, 150 by 01/14 5PM  Home meds per superscripts: lisinopril 10mg qd, nifedipine er 60mg qd. Per outpatient chart review Dec 2024, was meant to stop nifedipine & lisinopril and start amlodipine 10mg; does not appear patient did so.  TTE 7/2023: EF 60-65%, Grade I Diastolic Dysfunction  Changes consistent with LVH noted on EKG, LVH noted on prior echo.  - Holding home lisinopril and amlodipine/nifedipine for inability to tolerate PO  - s/p lopressor 5mg IVP in ED  - SBP 170s on arrival to [facility], MAP 120s   --- given hydral 10 + 2.5 IVP when SBP increased to 220s overnight  - start hydralazine 5mg q6h for SBP >160  - TTE complete: There is moderate-to-severe concentric left ventricular hypertrophy. Left ventricular ejection fraction is 55-60%. Trace AR, mild MR, mild VT Home meds per superscripts: lisinopril 10mg qd, nifedipine er 60mg qd. Per outpatient chart review Dec 2024, was meant to stop nifedipine & lisinopril and start amlodipine 10mg; does not appear patient did so.  TTE 7/2023: EF 60-65%, Grade I Diastolic Dysfunction  Changes consistent with LVH noted on EKG, LVH noted on prior echo.  sBP 190-200 in ED, s/p IV Lopressor and IV Hydral.   1/14 TTE: moderate-to-severe concentric LVH. Left ventricular ejection fraction is 55-60%. Trace AR, mild MR, mild DE  - s/p nifedipine 30mg x1 on 1/14 PM - sBP 130  - nifedipine 60mg q24h to start 1/14  - Holding home lisinopril Home meds per superscripts: lisinopril 10mg qd, nifedipine er 60mg qd. Per outpatient chart review Dec 2024, was meant to stop nifedipine & lisinopril and start amlodipine 10mg; does not appear patient did so.  TTE 7/2023: EF 60-65%, Grade I Diastolic Dysfunction  Changes consistent with LVH noted on EKG, LVH noted on prior echo.  sBP 190-200 in ED, s/p IV Lopressor and IV Hydral.   1/14 TTE: moderate-to-severe concentric LVH. Left ventricular ejection fraction is 55-60%. Trace AR, mild MR, mild TN  - s/p nifedipine 60mg x1 on 1/14 PM -> PM sBP 130  - nifedipine 60mg q24h to start 1/14  - Holding home lisinopril Home meds per superscripts: lisinopril 10mg qd, nifedipine er 60mg qd. Per outpatient chart review Dec 2024, was meant to stop nifedipine & lisinopril and start amlodipine 10mg; does not appear patient did so.  TTE 7/2023: EF 60-65%, Grade I Diastolic Dysfunction  Changes consistent with LVH noted on EKG, LVH noted on prior echo.  sBP 190-200 in ED, s/p IV Lopressor and IV Hydral.   1/14 TTE: moderate-to-severe concentric LVH. Left ventricular ejection fraction is 55-60%. Trace AR, mild MR, mild WV  - s/p nifedipine 60mg x1 on 1/14 PM -> PM sBP 130  - nifedipine 60mg q24h to start 1/15 - caution with rapid BP reduction  - Holding home lisinopril Home meds per superscripts: lisinopril 10mg qd, nifedipine er 60mg qd. Per outpatient chart review Dec 2024, was meant to stop nifedipine & lisinopril and start amlodipine 10mg; does not appear patient did so.  TTE 7/2023: EF 60-65%, Grade I Diastolic Dysfunction. Changes consistent with LVH noted on EKG, LVH noted on prior echo.  sBP 190-200 in ED, s/p IV Lopressor and IV Hydral.   1/14 TTE: moderate-to-severe concentric LVH. Left ventricular ejection fraction is 55-60%. Trace AR, mild MR, mild MN  - s/p nifedipine 60mg x1 on 1/14 PM -> PM sBP 130  - c/w nifedipine 60mg q24h - caution with rapid BP reduction  - Holding home lisinopril

## 2025-01-14 NOTE — PROGRESS NOTE ADULT - ASSESSMENT
74F with PMH of lung cancer (w/ mets to brain), CKD, HLD, DM2, RA (follows with Dr. Johnston), RLE DVT (off Eliquis d/t frequent falls), prior hospitalization for confusion, BIBEMS for AMS and unwitnessed fall, found to be hypertensive to 190s-200s in ED.

## 2025-01-14 NOTE — PROGRESS NOTE ADULT - PROBLEM SELECTOR PLAN 7
Initial Cr 2.21 (presumed baseline: 1.18-1.32 (08-10/2024)  - f/u uLytes  - q6h bladder scans - so far no concern of retention  - s/p LR bolus 1L R pelvis: Dislocation of the patient's right total hip arthroplasty with osseous. The change at the lateral aspect of the iliac bone and acetabulum likely chronic in nature. The left hip is normal in appearance. Calcified uterine fibroid. Degenerative changes of the lumbar spine. Vascular calcification. No fractures.  Ortho consulted in ED, recs as follows:   - Closed reduction performed in ED, confirmed by post-reduction XRs   - KI and Abduction pillow  - No surgical intervention indicated  - Posterior hip precautions  - Recommend PT eval once mental status improves  - Possible rehab placement, pending PT eval  - Possible abduction orthosis fitting Per chart review, patient with hx of RLE DVT and has had swelling of BL LE. Eliquis was discontinued given frequent falls.  - Doppler negative for DVTs A1c 9.0.   - miSS

## 2025-01-14 NOTE — PROGRESS NOTE ADULT - PROBLEM SELECTOR PLAN 10
Per chart review, patient with hx of RLE DVT and has had swelling of BL LE. Eliquis was discontinued given frequent falls.  - Doppler negative for DVTs 91% on RA on admission, now satting at 96-97% on RA, seen to be breathing comfortably, with CTABL and CXR overall clear.  - consider POCUS to r/o effusion/infiltrate in LLL  - given AMS and possible dysphagia, low threshold to start PNA coverage   - f/u full RVP Mg 1.2, K 3.4, 1/14 note, likely 2/2 poor PO intake    Plan  - nutrition consult  - hypokalemia likely iso AoCKD NSCLC - BMs s/p SRS, liver met.  Follows closely with Dr. Delaney, had appointment 12/10/24 and received gemcitabine. Per chart review: single agent gemcitabine with therapy, given on day 1 and day 8 of each 21-day cycle. "patient is on therapy with gemcitabine requiring intensive monitoring for toxicity such as cytopenias with CBC, CMP Q 2-3 wks"  - can consider heme/onc consult    #RA  Follows with Dr. Johnston. Per chart review, intermittently on 2mg decadron during flares.  - CTM
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
## 2025-01-15 DIAGNOSIS — A41.9 SEPSIS, UNSPECIFIED ORGANISM: ICD-10-CM

## 2025-01-15 LAB
ANION GAP SERPL CALC-SCNC: 13 MMOL/L — SIGNIFICANT CHANGE UP (ref 5–17)
BASOPHILS # BLD AUTO: 0.06 K/UL — SIGNIFICANT CHANGE UP (ref 0–0.2)
BASOPHILS NFR BLD AUTO: 0.4 % — SIGNIFICANT CHANGE UP (ref 0–2)
BUN SERPL-MCNC: 38 MG/DL — HIGH (ref 7–23)
CALCIUM SERPL-MCNC: 8.4 MG/DL — SIGNIFICANT CHANGE UP (ref 8.4–10.5)
CHLORIDE SERPL-SCNC: 104 MMOL/L — SIGNIFICANT CHANGE UP (ref 96–108)
CK SERPL-CCNC: 90 U/L — SIGNIFICANT CHANGE UP (ref 25–170)
CO2 SERPL-SCNC: 22 MMOL/L — SIGNIFICANT CHANGE UP (ref 22–31)
CREAT SERPL-MCNC: 2.12 MG/DL — HIGH (ref 0.5–1.3)
EGFR: 24 ML/MIN/1.73M2 — LOW
EOSINOPHIL # BLD AUTO: 0.11 K/UL — SIGNIFICANT CHANGE UP (ref 0–0.5)
EOSINOPHIL NFR BLD AUTO: 0.8 % — SIGNIFICANT CHANGE UP (ref 0–6)
GLUCOSE SERPL-MCNC: 158 MG/DL — HIGH (ref 70–99)
HCT VFR BLD CALC: 33.5 % — LOW (ref 34.5–45)
HGB BLD-MCNC: 10.2 G/DL — LOW (ref 11.5–15.5)
IMM GRANULOCYTES NFR BLD AUTO: 0.6 % — SIGNIFICANT CHANGE UP (ref 0–0.9)
LYMPHOCYTES # BLD AUTO: 1.62 K/UL — SIGNIFICANT CHANGE UP (ref 1–3.3)
LYMPHOCYTES # BLD AUTO: 11.6 % — LOW (ref 13–44)
MAGNESIUM SERPL-MCNC: 2 MG/DL — SIGNIFICANT CHANGE UP (ref 1.6–2.6)
MCHC RBC-ENTMCNC: 27.8 PG — SIGNIFICANT CHANGE UP (ref 27–34)
MCHC RBC-ENTMCNC: 30.4 G/DL — LOW (ref 32–36)
MCV RBC AUTO: 91.3 FL — SIGNIFICANT CHANGE UP (ref 80–100)
MONOCYTES # BLD AUTO: 1.05 K/UL — HIGH (ref 0–0.9)
MONOCYTES NFR BLD AUTO: 7.5 % — SIGNIFICANT CHANGE UP (ref 2–14)
NEUTROPHILS # BLD AUTO: 11.02 K/UL — HIGH (ref 1.8–7.4)
NEUTROPHILS NFR BLD AUTO: 79.1 % — HIGH (ref 43–77)
NRBC # BLD: 0 /100 WBCS — SIGNIFICANT CHANGE UP (ref 0–0)
PHOSPHATE SERPL-MCNC: 2.1 MG/DL — LOW (ref 2.5–4.5)
PLATELET # BLD AUTO: 162 K/UL — SIGNIFICANT CHANGE UP (ref 150–400)
POTASSIUM SERPL-MCNC: 4.3 MMOL/L — SIGNIFICANT CHANGE UP (ref 3.5–5.3)
POTASSIUM SERPL-SCNC: 4.3 MMOL/L — SIGNIFICANT CHANGE UP (ref 3.5–5.3)
RBC # BLD: 3.67 M/UL — LOW (ref 3.8–5.2)
RBC # FLD: 14.6 % — HIGH (ref 10.3–14.5)
SODIUM SERPL-SCNC: 139 MMOL/L — SIGNIFICANT CHANGE UP (ref 135–145)
WBC # BLD: 13.95 K/UL — HIGH (ref 3.8–10.5)
WBC # FLD AUTO: 13.95 K/UL — HIGH (ref 3.8–10.5)

## 2025-01-15 PROCEDURE — 99232 SBSQ HOSP IP/OBS MODERATE 35: CPT | Mod: GC

## 2025-01-15 RX ORDER — NIFEDIPINE 60 MG/1
60 TABLET, EXTENDED RELEASE ORAL EVERY 24 HOURS
Refills: 0 | Status: DISCONTINUED | OUTPATIENT
Start: 2025-01-15 | End: 2025-01-17

## 2025-01-15 RX ADMIN — Medication 104 MILLIGRAM(S): at 06:34

## 2025-01-15 RX ADMIN — Medication 2: at 08:49

## 2025-01-15 RX ADMIN — Medication 7 UNIT(S): at 18:01

## 2025-01-15 RX ADMIN — INSULIN GLARGINE-YFGN 16 UNIT(S): 100 INJECTION, SOLUTION SUBCUTANEOUS at 22:18

## 2025-01-15 RX ADMIN — NIFEDIPINE 60 MILLIGRAM(S): 60 TABLET, EXTENDED RELEASE ORAL at 18:00

## 2025-01-15 RX ADMIN — Medication 104 MILLIGRAM(S): at 23:57

## 2025-01-15 RX ADMIN — Medication 7 UNIT(S): at 08:49

## 2025-01-15 RX ADMIN — Medication 104 MILLIGRAM(S): at 12:24

## 2025-01-15 RX ADMIN — HEPARIN SODIUM 5000 UNIT(S): 1000 INJECTION, SOLUTION INTRAVENOUS; SUBCUTANEOUS at 06:36

## 2025-01-15 RX ADMIN — HEPARIN SODIUM 5000 UNIT(S): 1000 INJECTION, SOLUTION INTRAVENOUS; SUBCUTANEOUS at 22:17

## 2025-01-15 RX ADMIN — HEPARIN SODIUM 5000 UNIT(S): 1000 INJECTION, SOLUTION INTRAVENOUS; SUBCUTANEOUS at 13:16

## 2025-01-15 RX ADMIN — Medication 104 MILLIGRAM(S): at 18:00

## 2025-01-15 RX ADMIN — ACETAMINOPHEN 650 MILLIGRAM(S): 80 SOLUTION/ DROPS ORAL at 17:12

## 2025-01-15 RX ADMIN — Medication 4: at 13:16

## 2025-01-15 RX ADMIN — ACETAMINOPHEN 650 MILLIGRAM(S): 80 SOLUTION/ DROPS ORAL at 18:08

## 2025-01-15 RX ADMIN — Medication 7 UNIT(S): at 13:16

## 2025-01-15 RX ADMIN — Medication 4: at 18:00

## 2025-01-15 RX ADMIN — Medication 2: at 22:17

## 2025-01-15 RX ADMIN — ATORVASTATIN CALCIUM 40 MILLIGRAM(S): 40 TABLET, FILM COATED ORAL at 22:17

## 2025-01-15 NOTE — PROGRESS NOTE ADULT - PROBLEM SELECTOR PLAN 3
Hx of recurrent GNR UTIs with MDR. Has had VRE sensitive to ampicillin (10/2024) in the past as well Klebsiella sensitive to CTX (07/2024), Klebsiella sensitive only to carbapenems (12/2023). Now with leukocytosis and + u/a.   UCx: >100k Enterococcus    - c/w ampicillin 500mg q6h  - c/w CTX 2g qd   - f/u UCx and sensitivities  - trend WBC

## 2025-01-15 NOTE — PROGRESS NOTE ADULT - PROBLEM SELECTOR PLAN 2
Patient met sepsis criteria on admission: 2/4 SIRS criteria (WBC >12, HR >90) + UTI source. s/p 2L fluids.  - 1/13 BCx NGTD  - rest of plan as below

## 2025-01-15 NOTE — PROGRESS NOTE ADULT - PROBLEM SELECTOR PLAN 4
Pt found down and confused at her apartment by HHA. Found to have R hip dislocation in ED.   Cause unclear but include sepsis vs hypertensive urgency/emergency vs orthostatic hypotension. Cardiac etiology less likely (no arrythmia seen while being monitored on tele, TTE   - f/u orthostatic vitals  - plan as below

## 2025-01-15 NOTE — PROGRESS NOTE ADULT - SUBJECTIVE AND OBJECTIVE BOX
--------STEPDOWN 7LA TO 7WO 1/14--------  HOSPITAL COURSE: 74 year old female with PMHx of lung cancer (w/ mets to brain), CKD, HLD, DM2, RA (follows with Dr. Johnston), RLE DVT (off Eliquis d/t frequent falls), prior hospitalization for confusion, BIBEMS for AMS and unwitnessed fall, found to be hypertensive to 190s-200s and severely agitated in the ED. Received ativan 1mg x1, Lopressor 5mg IVP x1, 1L NS x1. XR Pelvis performed showed dislocation of the patient's right total hip arthroplasty with osseous. Orthopedic surgery was consulted and they performed a R hip reduction while in the ED, and pt was admitted to [facility] for closer monitoring. In the unit, SBP of 170 and pt wasn't able to tolerate PO so was given hydralazine 2.5mg IVP. Another dose of hydral 10mg IVP was given along with 1L LR over 3 hours. Standing hydral q6h ordered. Pt is s/p 1 straight cath now given BS >500cc, repeat BS this afternoon ~100cc. While in the unit, patient also passed dysphagia screen and was started on CC diet with evening snack. No further surgical intervention per ortho, appreciate further recs. Her altered mentation has improved, she has been hemodynamically stable, and is thus ready for stepdown to [facility].         INTERVAL EVENTS: no significant overnight events.   SUBJECTIVE: No additional complaints or concerns reported by patient.   Denied chest pain, shortness of breath, chills/sweats/confusion, worsening fatigue, urinary or bowel concerns.   ROS negative otherwise.     Vital Signs Last 24 Hrs  T(C): 37.2 (15 Jaime 2025 06:02), Max: 37.8 (15 Jaime 2025 00:32)  T(F): 99 (15 Jaime 2025 06:02), Max: 100 (15 Jaime 2025 00:32)  HR: 84 (15 Jaime 2025 06:02) (80 - 100)  BP: 109/56 (15 Jaime 2025 06:02) (107/66 - 205/84)  BP(mean): 80 (15 Jaime 2025 00:32) (80 - 127)  RR: 18 (15 Jaime 2025 06:02) (17 - 18)  SpO2: 97% (15 Jaime 2025 06:02) (96% - 99%)    Parameters below as of 15 Jaime 2025 00:32  Patient On (Oxygen Delivery Method): room air     I&O's Summary    14 Jan 2025 07:01  -  15 Jaime 2025 06:55  --------------------------------------------------------  IN: 0 mL / OUT: 1200 mL / NET: -1200 mL      PHYSICAL EXAM:  General: NAD, lying in bed  HEENT: PERRL, EOM intact  Cardiovascular: RRR; no MRG  Respiratory: CTAB; no WRR  GI/: soft; NTND  Extremities: WWP; 2+ peripheral pulses bilaterally; no LE edema; legs with abduction pillow  Neurologic: AOx2-3; no focal deficits    LABS  CBC - WBC/HGB/HTC/PLT: 20.05/10.8/34.7/175 (01-14-25)  BMP: Na/K/Cl/Bicarb/BUN/Cr/Gluc: 142/3.3/105/23/27/2.04/146 (01-14-25)  Anion Gap: 14 (01-14-25) eGFR: 25 (01-14-25)  Calcium: 8.9 (01-14-25) Phosphorus: 2.8 (01-14-25) Magnesium: 2.2 (01-14-25)   PT/aPTT/INR: 13.9/27.0/1.19 (01-13-25) Thyroid Stimulating Hormone, Serum: 0.598 (06-06-24)  Total T4/Free T4: 7.99/-- (06-06-24)  -  Creatinine Trend: 2.04 <--, 1.95 <--, 2.07 <--, 2.24 <--, 2.21 <--  Urine Studies:  Urinalysis Basic - ( 14 Jan 2025 05:30 )    Color:  / Appearance:  / SG:  / pH:   Gluc: 146 mg/dL / Ketone:   / Bili:  / Urobili:    Blood:  / Protein:  / Nitrite:    Leuk Esterase:  / RBC:  / WBC    Sq Epi:  / Non Sq Epi:  / Bacteria:      A1C: 9.0 %  BUN: 27  Creatinine: 2.04  GFR: 25 Weight: 54.4 BMI: 21.3 EF- BNP:     Culture - Blood (collected 01-13-25 @ 16:20)  Source: .Blood BLOOD  Preliminary Report:    No growth at 24 hours    Culture - Urine (collected 01-13-25 @ 16:19)  Source: Clean Catch Clean Catch (Midstream)  Preliminary Report:    >100,000 CFU/ml Enterococcus species    Culture - Blood (collected 01-13-25 @ 16:15)  Source: .Blood BLOOD  Preliminary Report:    No growth at 24 hours    146 mg/dL (01-14 @ 05:30)  156 mg/dL (01-14 @ 06:16)  136 mg/dL (01-14 @ 07:22)  124 mg/dL (01-14 @ 13:04)  116 mg/dL (01-14 @ 16:37)  259 mg/dL (01-14 @ 21:44)      Activity - Bedrest (01-13-25 @ 18:38) [Active]  Diet, Consistent Carbohydrate/No Snacks (01-14-25 @ 12:56) [Active]        Social History:    Outpatient Providers list - N/A      Allergies    citrus (Urticaria)  Bactrim (Rash)    Intolerances      Home Medications:  acetaminophen 325 mg oral tablet: 2 tab(s) orally every 6 hours As needed Temp greater or equal to 38C (100.4F), Mild Pain (1 - 3) (19 Nov 2024 16:03)  amLODIPine 10 mg oral tablet: 1 tab(s) orally every 24 hours (19 Nov 2024 16:03)  atorvastatin 40 mg oral tablet: 1 tab(s) orally once a day (at bedtime) (01 Aug 2024 15:05)  insulin glargine 100 units/mL subcutaneous solution: 26 unit(s) subcutaneous once a day (at bedtime) (19 Nov 2024 16:03)  insulin lispro 100 units/mL injectable solution: 14 unit(s) subcutaneous 3 times a day (before meals) (19 Nov 2024 16:03)     MEDICATIONS  (STANDING):  ampicillin  IVPB 500 milliGRAM(s) IV Intermittent every 6 hours  atorvastatin 40 milliGRAM(s) Oral at bedtime  cefTRIAXone   IVPB 2000 milliGRAM(s) IV Intermittent every 24 hours  dextrose 5%. 1000 milliLiter(s) (50 mL/Hr) IV Continuous <Continuous>  dextrose 5%. 1000 milliLiter(s) (100 mL/Hr) IV Continuous <Continuous>  dextrose 50% Injectable 25 Gram(s) IV Push once  dextrose 50% Injectable 12.5 Gram(s) IV Push once  dextrose 50% Injectable 25 Gram(s) IV Push once  glucagon  Injectable 1 milliGRAM(s) IntraMuscular once  heparin   Injectable 5000 Unit(s) SubCutaneous every 8 hours  insulin glargine Injectable (LANTUS) 16 Unit(s) SubCutaneous at bedtime  insulin lispro (ADMELOG) corrective regimen sliding scale   SubCutaneous Before meals and at bedtime  insulin lispro Injectable (ADMELOG) 7 Unit(s) SubCutaneous three times a day before meals  NIFEdipine XL 60 milliGRAM(s) Oral every 24 hours    MEDICATIONS  (PRN):  acetaminophen     Tablet .. 650 milliGRAM(s) Oral every 6 hours PRN Temp greater or equal to 38C (100.4F), Mild Pain (1 - 3)  dextrose Oral Gel 15 Gram(s) Oral once PRN Blood Glucose LESS THAN 70 milliGRAM(s)/deciliter  ondansetron Injectable 4 milliGRAM(s) IV Push every 8 hours PRN Nausea and/or Vomiting     acetaminophen   IVPB ..   400 mL/Hr IV Intermittent (01-13-25 @ 16:46)    ampicillin  IVPB   104 mL/Hr IV Intermittent (01-15-25 @ 06:34)   104 mL/Hr IV Intermittent (01-14-25 @ 21:51)   104 mL/Hr IV Intermittent (01-14-25 @ 06:44)   104 mL/Hr IV Intermittent (01-14-25 @ 02:53)    atorvastatin   40 milliGRAM(s) Oral (01-14-25 @ 21:51)    bisacodyl Suppository   10 milliGRAM(s) Rectal (01-14-25 @ 02:19)    cefTRIAXone   IVPB   100 mL/Hr IV Intermittent (01-14-25 @ 18:26)    cefTRIAXone   IVPB   100 mL/Hr IV Intermittent (01-13-25 @ 16:47)    heparin   Injectable   5000 Unit(s) SubCutaneous (01-15-25 @ 06:36)   5000 Unit(s) SubCutaneous (01-14-25 @ 21:51)   5000 Unit(s) SubCutaneous (01-14-25 @ 15:49)   5000 Unit(s) SubCutaneous (01-14-25 @ 06:44)    hydrALAZINE Injectable   2.5 milliGRAM(s) IV Push (01-14-25 @ 01:41)    hydrALAZINE Injectable   10 milliGRAM(s) IV Push (01-14-25 @ 02:09)    hydrALAZINE Injectable   5 milliGRAM(s) IV Push (01-14-25 @ 12:57)   5 milliGRAM(s) IV Push (01-14-25 @ 06:44)    insulin glargine Injectable (LANTUS)   16 Unit(s) SubCutaneous (01-14-25 @ 21:50)    insulin lispro (ADMELOG) corrective regimen sliding scale   6 Unit(s) SubCutaneous (01-14-25 @ 21:48)   2 Unit(s) SubCutaneous (01-13-25 @ 21:22)    insulin lispro Injectable (ADMELOG)   7 Unit(s) SubCutaneous (01-14-25 @ 16:41)    lactated ringers Bolus   333.33 mL/Hr IV Bolus (01-14-25 @ 02:18)    LORazepam   Injectable   1 milliGRAM(s) IV Push (01-13-25 @ 15:50)    magnesium sulfate  IVPB   25 mL/Hr IV Intermittent (01-14-25 @ 02:18)    metoprolol tartrate Injectable   5 milliGRAM(s) IV Push (01-13-25 @ 17:57)    NIFEdipine XL   60 milliGRAM(s) Oral (01-14-25 @ 18:36)    potassium chloride   Powder   40 milliEquivalent(s) Oral (01-14-25 @ 18:26)   40 milliEquivalent(s) Oral (01-14-25 @ 15:49)    potassium chloride  10 mEq/100 mL IVPB   100 mL/Hr IV Intermittent (01-14-25 @ 07:27)   100 mL/Hr IV Intermittent (01-14-25 @ 07:23)   100 mL/Hr IV Intermittent (01-14-25 @ 06:43)    sodium chloride 0.9% Bolus   1000 mL/Hr IV Bolus (01-13-25 @ 17:57)

## 2025-01-15 NOTE — PROGRESS NOTE ADULT - PROBLEM SELECTOR PLAN 6
Initial Cr 2.21 (presumed baseline: 1.18-1.32 (08-10/2024). Possibly pre-renal (iso sepsis) vs post-renal (urinary retention on admission).  s/p 2L fluids.   - f/u uLytes  - q6h bladder scans    #Hypoxia, RESOLVED.  91% on RA on admission, now satting at 96-97% on RA, seen to be breathing comfortably, with CTABL and CXR overall clear. RVP neg.

## 2025-01-15 NOTE — PROGRESS NOTE ADULT - PROBLEM SELECTOR PLAN 11
Mg 1.2, K 3.4, 1/14 note, likely 2/2 poor PO intake    Plan  - nutrition consult  - hypokalemia likely iso AoCKD

## 2025-01-15 NOTE — PROGRESS NOTE ADULT - SUBJECTIVE AND OBJECTIVE BOX
INTERVAL HPI/OVERNIGHT EVENTS:  Awake and alert   Urine culture noted;  Having pain at site of Right hip dislocation       MEDICATIONS  (STANDING):  ampicillin  IVPB 500 milliGRAM(s) IV Intermittent every 6 hours  atorvastatin 40 milliGRAM(s) Oral at bedtime  dextrose 5%. 1000 milliLiter(s) (50 mL/Hr) IV Continuous <Continuous>  dextrose 5%. 1000 milliLiter(s) (100 mL/Hr) IV Continuous <Continuous>  dextrose 50% Injectable 25 Gram(s) IV Push once  dextrose 50% Injectable 12.5 Gram(s) IV Push once  dextrose 50% Injectable 25 Gram(s) IV Push once  glucagon  Injectable 1 milliGRAM(s) IntraMuscular once  heparin   Injectable 5000 Unit(s) SubCutaneous every 8 hours  insulin glargine Injectable (LANTUS) 16 Unit(s) SubCutaneous at bedtime  insulin lispro (ADMELOG) corrective regimen sliding scale   SubCutaneous Before meals and at bedtime  insulin lispro Injectable (ADMELOG) 7 Unit(s) SubCutaneous three times a day before meals  NIFEdipine XL 60 milliGRAM(s) Oral every 24 hours    MEDICATIONS  (PRN):  acetaminophen     Tablet .. 650 milliGRAM(s) Oral every 6 hours PRN Temp greater or equal to 38C (100.4F), Mild Pain (1 - 3)  dextrose Oral Gel 15 Gram(s) Oral once PRN Blood Glucose LESS THAN 70 milliGRAM(s)/deciliter  ondansetron Injectable 4 milliGRAM(s) IV Push every 8 hours PRN Nausea and/or Vomiting      Allergies    citrus (Urticaria)  Bactrim (Rash)    Intolerances        Vital Signs Last 24 Hrs  T(C): 37.1 (15 Jaime 2025 11:45), Max: 37.8 (15 Jaime 2025 00:32)  T(F): 98.8 (15 Jaime 2025 11:45), Max: 100 (15 Jaime 2025 00:32)  HR: 93 (15 Jaime 2025 11:45) (84 - 100)  BP: 112/57 (15 Jaime 2025 11:45) (107/66 - 205/84)  BP(mean): 80 (15 Jaime 2025 00:32) (80 - 121)  RR: 18 (15 Jaime 2025 11:45) (17 - 18)  SpO2: 93% (15 Jaime 2025 11:45) (93% - 99%)    Parameters below as of 15 Jaime 2025 11:45  Patient On (Oxygen Delivery Method): room air              Constitutional: Awake     Eyes: NEDRA    ENMT: Negative    Neck: Supple    Back:  no tenderness     Respiratory:  clear     Cardiovascular: S1 S2    Gastrointestinal: soft     Genitourinary:    Extremities: no edema     Vascular:    Neurological:    Skin:    Lymph Nodes:            01-14 @ 07:01  -  01-15 @ 07:00  --------------------------------------------------------  IN: 0 mL / OUT: 1200 mL / NET: -1200 mL      LABS:                        10.2   13.95 )-----------( 162      ( 15 Jaime 2025 05:30 )             33.5     01-15    139  |  104  |  38[H]  ----------------------------<  158[H]  4.3   |  22  |  2.12[H]    Ca    8.4      15 Jaime 2025 05:30  Phos  2.1     01-15  Mg     2.0     01-15      PT/INR - ( 13 Jan 2025 19:00 )   PT: 13.9 sec;   INR: 1.19          PTT - ( 13 Jan 2025 19:00 )  PTT:27.0 sec  Urinalysis Basic - ( 15 Jaime 2025 05:30 )    Color: x / Appearance: x / SG: x / pH: x  Gluc: 158 mg/dL / Ketone: x  / Bili: x / Urobili: x   Blood: x / Protein: x / Nitrite: x   Leuk Esterase: x / RBC: x / WBC x   Sq Epi: x / Non Sq Epi: x / Bacteria: x        RADIOLOGY & ADDITIONAL TESTS:

## 2025-01-15 NOTE — PROGRESS NOTE ADULT - ASSESSMENT
74F with PMH of lung cancer (w/ mets to brain), CKD, HLD, DM2, RA (follows with Dr. Johnston), RLE DVT (off Eliquis d/t frequent falls), prior hospitalization for confusion, BIBEMS for AMS and unwitnessed fall, found to be hypertensive to 190s-200s in ED. c/c/b VRE UTI, on ampicillin. AMS improved and BP controlled with IV hydralazine, transitioned to po nifedipine.

## 2025-01-15 NOTE — PROGRESS NOTE ADULT - PROBLEM SELECTOR PLAN 12
F: none currently, s/p 2L  E: replete K>4 and Mg > 2  N: CC  GI: none  DVT: Heparin subq q8 given DUNIA on CKD  Dispo: [facility]; PT/OT - SULMA

## 2025-01-15 NOTE — PROGRESS NOTE ADULT - PROBLEM SELECTOR PLAN 10
NSCLC - BMs s/p SRS, liver met.  Follows closely with Dr. Delaney, had appointment 12/10/24 and received gemcitabine. Per chart review: single agent gemcitabine with therapy, given on day 1 and day 8 of each 21-day cycle. "patient is on therapy with gemcitabine requiring intensive monitoring for toxicity such as cytopenias with CBC, CMP Q 2-3 wks"  - can consider heme/onc consult    #RA  Follows with Dr. Johnston. Per chart review, intermittently on 2mg decadron during flares.  - CTM

## 2025-01-15 NOTE — PATIENT PROFILE ADULT - FUNCTIONAL ASSESSMENT - BASIC MOBILITY SCORE.
Spine appears normal, range of motion is not limited,  left wrist- no deformity, NROM, tender to lateral aspect, distal pulses normal sensations intact.
10

## 2025-01-15 NOTE — PROGRESS NOTE ADULT - PROBLEM SELECTOR PLAN 1
Home meds per superscripts: lisinopril 10mg qd, nifedipine er 60mg qd. Per outpatient chart review Dec 2024, was meant to stop nifedipine & lisinopril and start amlodipine 10mg; does not appear patient did so.  TTE 7/2023: EF 60-65%, Grade I Diastolic Dysfunction. Changes consistent with LVH noted on EKG, LVH noted on prior echo.  sBP 190-200 in ED, s/p IV Lopressor and IV Hydral.   1/14 TTE: moderate-to-severe concentric LVH. Left ventricular ejection fraction is 55-60%. Trace AR, mild MR, mild NH  - s/p nifedipine 60mg x1 on 1/14 PM -> PM sBP 130  - c/w nifedipine 60mg q24h - caution with rapid BP reduction  - Holding home lisinopril

## 2025-01-16 ENCOUNTER — TRANSCRIPTION ENCOUNTER (OUTPATIENT)
Age: 75
End: 2025-01-16

## 2025-01-16 DIAGNOSIS — R53.81 OTHER MALAISE: ICD-10-CM

## 2025-01-16 DIAGNOSIS — Z71.89 OTHER SPECIFIED COUNSELING: ICD-10-CM

## 2025-01-16 DIAGNOSIS — Z51.5 ENCOUNTER FOR PALLIATIVE CARE: ICD-10-CM

## 2025-01-16 LAB
-  AMPICILLIN: SIGNIFICANT CHANGE UP
-  CIPROFLOXACIN: SIGNIFICANT CHANGE UP
-  LEVOFLOXACIN: SIGNIFICANT CHANGE UP
-  LINEZOLID: SIGNIFICANT CHANGE UP
-  NITROFURANTOIN: SIGNIFICANT CHANGE UP
-  TETRACYCLINE: SIGNIFICANT CHANGE UP
-  VANCOMYCIN: SIGNIFICANT CHANGE UP
ANION GAP SERPL CALC-SCNC: 12 MMOL/L — SIGNIFICANT CHANGE UP (ref 5–17)
BASOPHILS # BLD AUTO: 0.04 K/UL — SIGNIFICANT CHANGE UP (ref 0–0.2)
BASOPHILS NFR BLD AUTO: 0.4 % — SIGNIFICANT CHANGE UP (ref 0–2)
BUN SERPL-MCNC: 44 MG/DL — HIGH (ref 7–23)
CALCIUM SERPL-MCNC: 8.4 MG/DL — SIGNIFICANT CHANGE UP (ref 8.4–10.5)
CHLORIDE SERPL-SCNC: 107 MMOL/L — SIGNIFICANT CHANGE UP (ref 96–108)
CO2 SERPL-SCNC: 20 MMOL/L — LOW (ref 22–31)
CREAT SERPL-MCNC: 2.27 MG/DL — HIGH (ref 0.5–1.3)
CULTURE RESULTS: ABNORMAL
EGFR: 22 ML/MIN/1.73M2 — LOW
EOSINOPHIL # BLD AUTO: 0.28 K/UL — SIGNIFICANT CHANGE UP (ref 0–0.5)
EOSINOPHIL NFR BLD AUTO: 2.5 % — SIGNIFICANT CHANGE UP (ref 0–6)
GLUCOSE SERPL-MCNC: 175 MG/DL — HIGH (ref 70–99)
HCT VFR BLD CALC: 33.1 % — LOW (ref 34.5–45)
HGB BLD-MCNC: 10.2 G/DL — LOW (ref 11.5–15.5)
IMM GRANULOCYTES NFR BLD AUTO: 0.6 % — SIGNIFICANT CHANGE UP (ref 0–0.9)
LYMPHOCYTES # BLD AUTO: 1.53 K/UL — SIGNIFICANT CHANGE UP (ref 1–3.3)
LYMPHOCYTES # BLD AUTO: 13.7 % — SIGNIFICANT CHANGE UP (ref 13–44)
MAGNESIUM SERPL-MCNC: 1.9 MG/DL — SIGNIFICANT CHANGE UP (ref 1.6–2.6)
MCHC RBC-ENTMCNC: 26.8 PG — LOW (ref 27–34)
MCHC RBC-ENTMCNC: 30.8 G/DL — LOW (ref 32–36)
MCV RBC AUTO: 87.1 FL — SIGNIFICANT CHANGE UP (ref 80–100)
METHOD TYPE: SIGNIFICANT CHANGE UP
MONOCYTES # BLD AUTO: 0.82 K/UL — SIGNIFICANT CHANGE UP (ref 0–0.9)
MONOCYTES NFR BLD AUTO: 7.4 % — SIGNIFICANT CHANGE UP (ref 2–14)
NEUTROPHILS # BLD AUTO: 8.41 K/UL — HIGH (ref 1.8–7.4)
NEUTROPHILS NFR BLD AUTO: 75.4 % — SIGNIFICANT CHANGE UP (ref 43–77)
NRBC # BLD: 0 /100 WBCS — SIGNIFICANT CHANGE UP (ref 0–0)
ORGANISM # SPEC MICROSCOPIC CNT: ABNORMAL
ORGANISM # SPEC MICROSCOPIC CNT: SIGNIFICANT CHANGE UP
PHOSPHATE SERPL-MCNC: 2.5 MG/DL — SIGNIFICANT CHANGE UP (ref 2.5–4.5)
PLATELET # BLD AUTO: 176 K/UL — SIGNIFICANT CHANGE UP (ref 150–400)
POTASSIUM SERPL-MCNC: 4.3 MMOL/L — SIGNIFICANT CHANGE UP (ref 3.5–5.3)
POTASSIUM SERPL-SCNC: 4.3 MMOL/L — SIGNIFICANT CHANGE UP (ref 3.5–5.3)
RBC # BLD: 3.8 M/UL — SIGNIFICANT CHANGE UP (ref 3.8–5.2)
RBC # FLD: 14.3 % — SIGNIFICANT CHANGE UP (ref 10.3–14.5)
SODIUM SERPL-SCNC: 139 MMOL/L — SIGNIFICANT CHANGE UP (ref 135–145)
SPECIMEN SOURCE: SIGNIFICANT CHANGE UP
WBC # BLD: 11.15 K/UL — HIGH (ref 3.8–10.5)
WBC # FLD AUTO: 11.15 K/UL — HIGH (ref 3.8–10.5)

## 2025-01-16 PROCEDURE — 99223 1ST HOSP IP/OBS HIGH 75: CPT

## 2025-01-16 PROCEDURE — 99232 SBSQ HOSP IP/OBS MODERATE 35: CPT | Mod: GC

## 2025-01-16 RX ORDER — SODIUM CHLORIDE 9 MG/ML
1000 INJECTION, SOLUTION INTRAVENOUS ONCE
Refills: 0 | Status: COMPLETED | OUTPATIENT
Start: 2025-01-16 | End: 2025-01-16

## 2025-01-16 RX ORDER — POLYETHYLENE GLYCOL 3350 17 G/DOSE
17 POWDER (GRAM) ORAL DAILY
Refills: 0 | Status: DISCONTINUED | OUTPATIENT
Start: 2025-01-16 | End: 2025-01-17

## 2025-01-16 RX ORDER — SENNOSIDES 8.6 MG/1
2 TABLET, FILM COATED ORAL AT BEDTIME
Refills: 0 | Status: DISCONTINUED | OUTPATIENT
Start: 2025-01-16 | End: 2025-01-17

## 2025-01-16 RX ADMIN — Medication 2: at 08:47

## 2025-01-16 RX ADMIN — HEPARIN SODIUM 5000 UNIT(S): 1000 INJECTION, SOLUTION INTRAVENOUS; SUBCUTANEOUS at 22:01

## 2025-01-16 RX ADMIN — INSULIN GLARGINE-YFGN 16 UNIT(S): 100 INJECTION, SOLUTION SUBCUTANEOUS at 22:01

## 2025-01-16 RX ADMIN — Medication 104 MILLIGRAM(S): at 17:37

## 2025-01-16 RX ADMIN — ACETAMINOPHEN 650 MILLIGRAM(S): 80 SOLUTION/ DROPS ORAL at 10:33

## 2025-01-16 RX ADMIN — Medication 7 UNIT(S): at 17:38

## 2025-01-16 RX ADMIN — Medication 2: at 17:38

## 2025-01-16 RX ADMIN — Medication 7 UNIT(S): at 13:10

## 2025-01-16 RX ADMIN — Medication 4: at 22:00

## 2025-01-16 RX ADMIN — SODIUM CHLORIDE 1000 MILLILITER(S): 9 INJECTION, SOLUTION INTRAVENOUS at 17:37

## 2025-01-16 RX ADMIN — Medication 104 MILLIGRAM(S): at 23:06

## 2025-01-16 RX ADMIN — HEPARIN SODIUM 5000 UNIT(S): 1000 INJECTION, SOLUTION INTRAVENOUS; SUBCUTANEOUS at 06:59

## 2025-01-16 RX ADMIN — Medication 104 MILLIGRAM(S): at 07:01

## 2025-01-16 RX ADMIN — Medication 104 MILLIGRAM(S): at 11:39

## 2025-01-16 RX ADMIN — ATORVASTATIN CALCIUM 40 MILLIGRAM(S): 40 TABLET, FILM COATED ORAL at 22:01

## 2025-01-16 RX ADMIN — Medication 7 UNIT(S): at 08:48

## 2025-01-16 RX ADMIN — ACETAMINOPHEN 650 MILLIGRAM(S): 80 SOLUTION/ DROPS ORAL at 11:30

## 2025-01-16 RX ADMIN — SENNOSIDES 2 TABLET(S): 8.6 TABLET, FILM COATED ORAL at 22:01

## 2025-01-16 RX ADMIN — NIFEDIPINE 60 MILLIGRAM(S): 60 TABLET, EXTENDED RELEASE ORAL at 17:37

## 2025-01-16 RX ADMIN — Medication 4: at 13:10

## 2025-01-16 RX ADMIN — HEPARIN SODIUM 5000 UNIT(S): 1000 INJECTION, SOLUTION INTRAVENOUS; SUBCUTANEOUS at 14:06

## 2025-01-16 NOTE — DISCHARGE NOTE PROVIDER - NSFOLLOWUPCLINICS_GEN_ALL_ED_FT
[facility]  Family Medicine  [address]  [place], [place] [address]  Phone: [phone]  Fax:

## 2025-01-16 NOTE — DIETITIAN INITIAL EVALUATION ADULT - OTHER INFO
74F with PMH of lung cancer (with mets to brain), CKD, HLD, DM2, RA, and RLE DVT with prior hospitalization for confusion who presented for AMS and unwitnessed fall, found to be hypertensive to 190s-200s in ED, admitted for management. Course complicated by VRE UTI, on ampicillin. AMS improved and BP controlled with IV hydralazine, transitioned to PO nifedipine.    Pt seen on 7WO for assessment. Labs and medication orders reviewed. Ordered for 16U lantus, 7U premeal admelog. Electrolytes WNL, BUN/Cr 44/2.27 <high>, POC blood glucose (1/15-1/16) 172-221. On Consistent Carbohydrate diet. Pt resting comfortably on visit. Reports good appetite and intake, RD observed pt completed majority of breakfast tray this AM. Pt denies difficulty chewing/swallowing. Wt history per prior admission RD notes: (7/30) 135lb/61.2kg, (8/16) 144lb/65.2kg, (11/12) 142lb/64.3kg; ?accuracy stated admission wt 120lb/54.4kg as RD obtained bed scale wt this AM 142lb/64.7kg consistent with history and RD observation, indicates wt uptrending ~x6 months. No overt signs of wasting identified. Pt denies nausea/vomiting/diarrhea, endorses chronic bowel irregularity with last BM x3d ago - bowel regimen ordered. No abdominal distension/discomfort noted, no pain reported. Pt reports allergies to citrus and strawberries - RD updated chart. No Amish/ethnic/cultural food preferences noted. No pressure injuries or edema documented, Fabricio score 13. See nutrition recommendations. RD to remain available.

## 2025-01-16 NOTE — PROGRESS NOTE ADULT - PROVIDER SPECIALTY LIST ADULT
Internal Medicine
Orthopedics
Internal Medicine
Rehab Medicine
Internal Medicine

## 2025-01-16 NOTE — DIETITIAN INITIAL EVALUATION ADULT - PERTINENT MEDS FT
MEDICATIONS  (STANDING):  ampicillin  IVPB 500 milliGRAM(s) IV Intermittent every 6 hours  atorvastatin 40 milliGRAM(s) Oral at bedtime  dextrose 5%. 1000 milliLiter(s) (50 mL/Hr) IV Continuous <Continuous>  dextrose 5%. 1000 milliLiter(s) (100 mL/Hr) IV Continuous <Continuous>  dextrose 50% Injectable 25 Gram(s) IV Push once  dextrose 50% Injectable 12.5 Gram(s) IV Push once  dextrose 50% Injectable 25 Gram(s) IV Push once  glucagon  Injectable 1 milliGRAM(s) IntraMuscular once  heparin   Injectable 5000 Unit(s) SubCutaneous every 8 hours  insulin glargine Injectable (LANTUS) 16 Unit(s) SubCutaneous at bedtime  insulin lispro (ADMELOG) corrective regimen sliding scale   SubCutaneous Before meals and at bedtime  insulin lispro Injectable (ADMELOG) 7 Unit(s) SubCutaneous three times a day before meals  NIFEdipine XL 60 milliGRAM(s) Oral every 24 hours  senna 2 Tablet(s) Oral at bedtime    MEDICATIONS  (PRN):  acetaminophen     Tablet .. 650 milliGRAM(s) Oral every 6 hours PRN Temp greater or equal to 38C (100.4F), Mild Pain (1 - 3)  dextrose Oral Gel 15 Gram(s) Oral once PRN Blood Glucose LESS THAN 70 milliGRAM(s)/deciliter  ondansetron Injectable 4 milliGRAM(s) IV Push every 8 hours PRN Nausea and/or Vomiting  polyethylene glycol 3350 17 Gram(s) Oral daily PRN Constipation

## 2025-01-16 NOTE — PROGRESS NOTE ADULT - SUBJECTIVE AND OBJECTIVE BOX
INTERVAL HPI/OVERNIGHT EVENTS:  Awake and alert;  No pain   Labs noted ; Creatinine increased;  Urine culture noted        MEDICATIONS  (STANDING):  ampicillin  IVPB 500 milliGRAM(s) IV Intermittent every 6 hours  atorvastatin 40 milliGRAM(s) Oral at bedtime  dextrose 5%. 1000 milliLiter(s) (50 mL/Hr) IV Continuous <Continuous>  dextrose 5%. 1000 milliLiter(s) (100 mL/Hr) IV Continuous <Continuous>  dextrose 50% Injectable 25 Gram(s) IV Push once  dextrose 50% Injectable 12.5 Gram(s) IV Push once  dextrose 50% Injectable 25 Gram(s) IV Push once  glucagon  Injectable 1 milliGRAM(s) IntraMuscular once  heparin   Injectable 5000 Unit(s) SubCutaneous every 8 hours  insulin glargine Injectable (LANTUS) 16 Unit(s) SubCutaneous at bedtime  insulin lispro (ADMELOG) corrective regimen sliding scale   SubCutaneous Before meals and at bedtime  insulin lispro Injectable (ADMELOG) 7 Unit(s) SubCutaneous three times a day before meals  NIFEdipine XL 60 milliGRAM(s) Oral every 24 hours  senna 2 Tablet(s) Oral at bedtime    MEDICATIONS  (PRN):  acetaminophen     Tablet .. 650 milliGRAM(s) Oral every 6 hours PRN Temp greater or equal to 38C (100.4F), Mild Pain (1 - 3)  dextrose Oral Gel 15 Gram(s) Oral once PRN Blood Glucose LESS THAN 70 milliGRAM(s)/deciliter  ondansetron Injectable 4 milliGRAM(s) IV Push every 8 hours PRN Nausea and/or Vomiting  polyethylene glycol 3350 17 Gram(s) Oral daily PRN Constipation      Allergies    citrus (Urticaria)  Bactrim (Rash)    Intolerances        Vital Signs Last 24 Hrs  T(C): 37.4 (16 Jan 2025 06:01), Max: 37.4 (15 Jaime 2025 21:02)  T(F): 99.3 (16 Jan 2025 06:01), Max: 99.3 (15 Jaime 2025 21:02)  HR: 92 (16 Jan 2025 06:01) (84 - 93)  BP: 131/62 (16 Jan 2025 06:01) (110/64 - 131/62)  BP(mean): --  RR: 18 (16 Jan 2025 06:01) (18 - 18)  SpO2: 95% (16 Jan 2025 06:01) (93% - 95%)    Parameters below as of 15 Jaime 2025 21:02  Patient On (Oxygen Delivery Method): room air              Constitutional: Awake     Eyes: NEDRA    ENMT: Negative    Neck: Supple    Back:  no tenderness     Respiratory:  clear     Cardiovascular: S1 S2    Gastrointestinal:  soft     Genitourinary:    Extremities:  no edema     Vascular:    Neurological: Awake and alert     Skin:    Lymph Nodes:            LABS:                        10.2   11.15 )-----------( 176      ( 16 Jan 2025 05:30 )             33.1     01-16    139  |  107  |  44[H]  ----------------------------<  175[H]  4.3   |  20[L]  |  2.27[H]    Ca    8.4      16 Jan 2025 05:30  Phos  2.5     01-16  Mg     1.9     01-16        Urinalysis Basic - ( 16 Jan 2025 05:30 )    Color: x / Appearance: x / SG: x / pH: x  Gluc: 175 mg/dL / Ketone: x  / Bili: x / Urobili: x   Blood: x / Protein: x / Nitrite: x   Leuk Esterase: x / RBC: x / WBC x   Sq Epi: x / Non Sq Epi: x / Bacteria: x        RADIOLOGY & ADDITIONAL TESTS:

## 2025-01-16 NOTE — DISCHARGE NOTE PROVIDER - NSDCCPCAREPLAN_GEN_ALL_CORE_FT
PRINCIPAL DISCHARGE DIAGNOSIS  Diagnosis: Hip dislocation, right  Assessment and Plan of Treatment:       SECONDARY DISCHARGE DIAGNOSES  Diagnosis: Fall  Assessment and Plan of Treatment:     Diagnosis: Acute UTI  Assessment and Plan of Treatment:     Diagnosis: CKD (chronic kidney disease)  Assessment and Plan of Treatment:     Diagnosis: Hip dislocation, right  Assessment and Plan of Treatment:      PRINCIPAL DISCHARGE DIAGNOSIS  Diagnosis: Hip dislocation, right  Assessment and Plan of Treatment:       SECONDARY DISCHARGE DIAGNOSES  Diagnosis: Acute UTI  Assessment and Plan of Treatment: Please continue taking Augmtin 875mg twice a day for 5 more days to finish your antibiotic course.    Diagnosis: Fall  Assessment and Plan of Treatment:     Diagnosis: Hip dislocation, right  Assessment and Plan of Treatment:     Diagnosis: CKD (chronic kidney disease)  Assessment and Plan of Treatment:      PRINCIPAL DISCHARGE DIAGNOSIS  Diagnosis: Hip dislocation, right  Assessment and Plan of Treatment:       SECONDARY DISCHARGE DIAGNOSES  Diagnosis: Acute UTI  Assessment and Plan of Treatment: Please continue taking Augmtin 875mg twice a day for 3 more days to finish your antibiotic course. This starts on 1/18/25, ends with last two doses 1/20/25. Followup appoints as listed in instructions sheet    Diagnosis: Fall  Assessment and Plan of Treatment:     Diagnosis: Hip dislocation, right  Assessment and Plan of Treatment:     Diagnosis: CKD (chronic kidney disease)  Assessment and Plan of Treatment:

## 2025-01-16 NOTE — PROGRESS NOTE ADULT - SUBJECTIVE AND OBJECTIVE BOX
SUBJECTIVE: Pt seen and examined on morning rounds resting comfortably in bed with no acute complaints Pt reports no pain of the right hip and is tolerating her KI and abduction pillow.    Vital Signs Last 24 Hrs  T(C): 37.4 (16 Jan 2025 06:01), Max: 37.4 (15 Jaime 2025 21:02)  T(F): 99.3 (16 Jan 2025 06:01), Max: 99.3 (15 Jaime 2025 21:02)  HR: 92 (16 Jan 2025 06:01) (84 - 93)  BP: 131/62 (16 Jan 2025 06:01) (110/64 - 131/62)  BP(mean): --  RR: 18 (16 Jan 2025 06:01) (18 - 18)  SpO2: 95% (16 Jan 2025 06:01) (93% - 95%)    Parameters below as of 15 Jaime 2025 21:02  Patient On (Oxygen Delivery Method): room air        Physical Exam:  General: NAD, resting comfortably in bed  Lower Extremities:  KI and abduction pillow in place  SILT SPN/DPN/Saph/Alexandra/Tib  Motor 5/5 TA/GS/EHL/FHL  Pulses 2+ DPs    Assessment/Plan:  74yF s/p closed reduction R Prosthetic hip dislocation on 01-13  - Weight Bearing Status: WBAT, posterior hip precautions  - Pain control  - DVT PPx  - PT eval  - possible custom abduction orthotic on discharge  - rest of care per primary team SUBJECTIVE: Pt seen and examined on morning rounds resting comfortably in bed with no acute complaints Pt reports no pain of the right hip and is tolerating her KI and abduction pillow.    Vital Signs Last 24 Hrs  T(C): 37.4 (16 Jan 2025 06:01), Max: 37.4 (15 Jaime 2025 21:02)  T(F): 99.3 (16 Jan 2025 06:01), Max: 99.3 (15 Jaime 2025 21:02)  HR: 92 (16 Jan 2025 06:01) (84 - 93)  BP: 131/62 (16 Jan 2025 06:01) (110/64 - 131/62)  BP(mean): --  RR: 18 (16 Jan 2025 06:01) (18 - 18)  SpO2: 95% (16 Jan 2025 06:01) (93% - 95%)    Parameters below as of 15 Jaime 2025 21:02  Patient On (Oxygen Delivery Method): room air        Physical Exam:  General: NAD, resting comfortably in bed  Lower Extremities:  KI and abduction pillow in place  SILT SPN/DPN/Saph/Alexandra/Tib  Motor 5/5 TA/GS/EHL/FHL  Pulses 2+ DPs    Assessment/Plan:  74yF s/p closed reduction R Prosthetic hip dislocation on 01-13  - Weight Bearing Status: WBAT, posterior hip precautions  - Pain control  - DVT PPx  - PT eval  - Custom abduction orthotic from [facility]  - rest of care per primary team

## 2025-01-16 NOTE — CONSULT NOTE ADULT - ASSESSMENT
{\rtf1\[id number]\ansi\tsnejvi9276\ftnbj\uc1\deff0  {\fonttbl{\f0 \fnil Segoe UI;}{\f1 \fnil \fcharset0 Segoe UI;}{\f2 \fnil Times New Aydin;}}  {\colortbl ;\zbg025\tethw976\sxeh038 ;\red0\green0\blue0 ;\red0\green0\ugen273 ;\red0\green0\blue0 ;}  {\stylesheet{\f0\fs20 Normal;}{\cs1 Default Paragraph Font;}{\cs2\f0\fs16 Line Number;}{\cs3\f2\fs24\ul\cf3 Hyperlink;}}  {\*\revtbl{Unknown;}}  \ugcnvf40659\xcosht95043\zumsk3220\twjpw0083\ptamd7551\gbtcb1690\npxaono598\axseaij375\nogrowautofit\fgpmec866\formshade\nofeaturethrottle1\dntblnsbdb\fet4\aendnotes\aftnnrlc\pgbrdrhead\pgbrdrfoot  \sectd\gqhrzh24660\uhlzll73428\guttersxn0\jbnzfrfl1252\wtznwnay6716\kzuldvwe7111\lwhiehao1735\irtjzkd606\jaywmmh368\sbkpage\pgncont\pgndec  \plain\plain\f0\fs24\ql\plain\f0\fs24\plain\f0\fs20\epte1894\hich\f0\dbch\f0\loch\f0\fs20\par  I M\par  \par  74 y o F with PMH of lung cancer (w/ mets to brain), CKD, HLD, DM2, RA (follows with Dr. Johnston), RLE DVT (off Eliquis d/t frequent falls), prior hospitalization for confusion, BIBEMS for AMS and unwitnessed fall, found to be hypertensive to 190s-200s   in ED.\par  \par  NEURO:\par  #AMS\par  #Lung Ca with Brain Mets\par  #Metabolic encephalopathy\par  2/2 infection vs. HTN vs trauma vs progression of mets\par  CTH in ED w/o acute abnormality. \par  Has presented prior with confusion, was discharged to [facility] and [facility] 3hrs/day x 7 days per wk starter per chart review. \par  - consider Hemeonc consult in AM\par  - mgmt of HTN as below\par  - mgmt of infection as below\par  - delirium and fall precautions\par  - required lorazepam 1mg in ED, can give haldol vs zyprexa while pt unable to tolerate PO if more agitation not redirectable\par  \par  CARDS:\par  #Hypertensive Urgency/Emergency\par  #HTN\par  #LVH\par  SBP 200s\par  Goal SBP: 170s overnight, 150 by 01/14 5PM\par  Home meds per superscripts: lisinopril 10mg qd, nifedipine er 60mg qd. Per outpatient chart review Dec 2024, was meant to stop nifedipine & lisinopril and start amlodipine 10mg; does not appear patient did so.\par  TTE 7/2023: EF 60-65%, Grade I Diastolic Dysfunction\par  Changes consistent with LVH noted on EKG, LVH noted on prior echo.\par  - Holding home lisinopril and amlodipine/nifedipine for inability to tolerate PO\par  - s/p lopressor 5mg IVP in ED\par  - SBP 170s on arrival to [facility], MAP 120s.\par   --- given hydral 10 + 2.5 IVP when SBP increased to 220s overnight\par  - start hydralazine 5mg q6 PRN for SBP >160\par  - f/u TTE complete\par  \par  \par  #HLD\par  Home med atorvastatin 40mg qd. On exam, patient LLE seen to be cool to touch, pulse palpable. Possibly 2/2 vascular disease. \par  - restart pending S&S recs\par  - obtain Dopplers b/l\par  \par  PULM:\par  #Hypoxia\par  91% on RA on admission, now satting at 96-97% on RA, seen to be breathing comfortably, with CTABL and CXR overall clear.\par  - consider POCUS to r/o effusion/infiltrate in LLL\par  - given AMS and possible dysphagia, low threshold to start PNA coverage \par  - f/u full RVP\par  \par  GI:\par  #r/o dysphagia\par  #LUQ Abdominal TTP\par  Patient too altered to receive PO meds in ED. Also found down. Has no known hx of dysphagia otherwise. Abdomen on admission with TTP in LUQ. \par  - pending bedside dysphagia screening\par  - S&S assessment\par  - NPO until then\par  - pending official abd xr read\par  --- appears to have stool burden \par  --- start suppository as pt can't tolerate PO\par  \par  Renal/:\par  #UTI\par  U/A: turbid, spec grav 1.015, Protein 300, Nitrite negative, Large LE, pH 6.0, , RBC 5, Many bacteria\par  Hx of recurrent GNR UTIs with MDR. Has had VRE sensitive to ampicillin (10/2024) in the past as well Klebsiella sensitive to CTX (07/2024), Klebsiella sensitive only to carbapenems (12/2023). Now with leukocytosis and + u/a. \par  - ampicillin 500mg q6\par  - CTX 1g qd\par  - f/u UCx and sensitivities\par  - trend WBC\par  \par  #DUNIA on CKD\par  Initial Cr 2.21 (presumed baseline: 1.18-1.32 (08-10/2024)\par  - f/u uLytes\par  - q6h bladder scans\par  - give LR bolus 1L \par  \par  #Hypomagnesemia\par  #Hypokalemia\par  Mg 1.2, K 3.4, likely 2/2 poor PO intake\par  - replete Mg\par  - do not replete K iso DUNIA on CKD\par  - nutrition consult\par  \par  ENDO:\par  #IDDM\par  - mISS\par  \par  HEME/ONC:\par  #NSCLC - BMs s/p SRS, liver met.\par  Follows closely with Dr. Delaney, had appointment 12/10/24 and received gemcitabine. Per chart review: single agent gemcitabine with therapy, given on day 1 and day 8 of each 21-day cycle. "patient is on therapy with gemcitabine requiring intensive monitoring   for toxicity such as cytopenias with CBC, CMP Q 2-3 wks"\par  - Heme/onc consult in AM\par  \par  #Hx of RLE DVT\line Per chart review, patient with hx of RLE DVT and has had swelling of BL LE. Eliquis was discontinued given frequent falls.\par  - f/u BL LE doppler\par  \par  \par  ID:\par  #Leukocytosis\par  2/2 chemo vs. infection (+RSV 01/02)\par  - F/u RVP \par  - F/u uLegionella, uStrep, MRSA swab\par  - F/u BCx x2\par  - F/u UCx\par  \par  #UTI\par  - treatment as above\par  \par  MSK:\par  #Hip dislocation\par  XR pelvis: Dislocation of the patient's right total hip arthroplasty with osseous. The change at the lateral aspect of the iliac bone and acetabulum likely chronic in nature. The left hip is normal in appearance. Calcified uterine fibroid. Degenerative   changes of the lumbar spine. Vascular calcification. No fractures.\par  Ortho consulted in ED, recs as follows: \par  - Closed reduction performed in ED, confirmed by post-reduction XRs \par  - KI and Abduction pillow\par  - No surgical intervention indicated\par  - Posterior hip precautions\par  - Recommend PT eval once mental status improves\par  - Possible rehab placement, pending PT eval\par  - Possible abduction orthosis fitting\par  \par  #Rheumatoid arthritis\par  Follows with Dr. Johnston. Per chart review, intermittently on 2mg decadron during flares.\par  - ctm\par  \par  PPX:\par  F: currently receiving 1L LR, s/p 1L NS in ED\par  E: replete Mg > 2\par  N: NPO, pending dysphagia screening\par  GI: --\par  DVT: Heparin subq q8 given DUNIA on CKD\par  Dispo: 7LA\par  \plain\f1\fs16\cddg7389\hich\f1\dbch\f1\loch\f1\cf2\fs16\par  \plain\f1\fs16\ygdo8540\hich\f1\dbch\f1\loch\f1\cf2\fs16\strike\plain\f1\fs16\plfq3736\hich\f1\dbch\f1\loch\f1\cf2\fs16\plain\f0\fs20\uvrw9403\hich\f0\dbch\f0\loch\f0\fs20\par  }  
74F with PMH of lung cancer (w/ mets to brain), CKD, HLD, DM2, RA (follows with Dr. Johnston), RLE DVT (off Eliquis d/t frequent falls), prior hospitalization for confusion, BIBEMS for AMS and unwitnessed fall, found to be hypertensive to 190s-200s in ED adn septic 2/2 VRE UTI. Palliative consulted for help with complex medical decision making.

## 2025-01-16 NOTE — DISCHARGE NOTE PROVIDER - NSDCCPTREATMENT_GEN_ALL_CORE_FT
PRINCIPAL PROCEDURE  Procedure: XR hip, 1 view  Findings and Treatment: IMPRESSION: The patient's right hip arthroplasty has been relocated.   There is no fracture identified.

## 2025-01-16 NOTE — PROGRESS NOTE ADULT - SUBJECTIVE AND OBJECTIVE BOX
INTERVAL HPI/OVERNIGHT EVENTS:  Awake and alert   Pain resolved. No acute concerns per patient.     Vital Signs Last 24 Hrs  T(C): 37.4 (16 Jan 2025 06:01), Max: 37.4 (15 Jaime 2025 21:02)  T(F): 99.3 (16 Jan 2025 06:01), Max: 99.3 (15 Jaime 2025 21:02)  HR: 92 (16 Jan 2025 06:01) (84 - 93)  BP: 131/62 (16 Jan 2025 06:01) (110/64 - 131/62)  BP(mean): --  RR: 18 (16 Jan 2025 06:01) (18 - 18)  SpO2: 95% (16 Jan 2025 06:01) (93% - 95%)    Parameters below as of 15 Jaime 2025 21:02  Patient On (Oxygen Delivery Method): room air    PHYSICAL EXAM:  General: NAD, lying in bed  HEENT: PERRL, EOM intact  Cardiovascular: RRR; no MRG  Respiratory: CTAB; no WRR  GI/: soft; NTND  Extremities: WWP; 2+ peripheral pulses bilaterally; no LE edema; legs with abduction pillow  Neurologic: AOx2-3; no focal deficits      LABS  CBC - WBC/HGB/HTC/PLT: 11.15/10.2/33.1/176 (01-16-25)  BMP: Na/K/Cl/Bicarb/BUN/Cr/Gluc: 139/4.3/107/20/44/2.27/175 (01-16-25)  Anion Gap: 12 (01-16-25) eGFR: 22 (01-16-25)  Calcium: 8.4 (01-16-25) Phosphorus: 2.5 (01-16-25) Magnesium: 1.9 (01-16-25)   PT/aPTT/INR: 13.9/27.0/1.19 (01-13-25) Thyroid Stimulating Hormone, Serum: 0.598 (06-06-24)  Total T4/Free T4: 7.99/-- (06-06-24)  -  Creatinine Trend: 2.27 <--, 2.12 <--, 2.04 <--, 1.95 <--, 2.07 <--, 2.24 <--, 2.21 <--  Urine Studies:  Urinalysis Basic - ( 16 Jan 2025 05:30 )    Color:  / Appearance:  / SG:  / pH:   Gluc: 175 mg/dL / Ketone:   / Bili:  / Urobili:    Blood:  / Protein:  / Nitrite:    Leuk Esterase:  / RBC:  / WBC    Sq Epi:  / Non Sq Epi:  / Bacteria:                  A1C: 9.0 %  BUN: 44  Creatinine: 2.27  GFR: 22 Weight: 54.4 BMI: 21.3 EF- BNP:     Culture - Blood (collected 01-13-25 @ 16:20)  Source: .Blood BLOOD  Preliminary Report:    No growth at 48 Hours    Culture - Urine (collected 01-13-25 @ 16:19)  Source: Clean Catch Clean Catch (Midstream)  Final Report:    >100,000 CFU/ml Enterococcus faecalis (vancomycin resistant)  Organism: Enterococcus faecalis (vancomycin resistant)  Organism: Enterococcus faecalis (vancomycin resistant)    Sensitivities:      -  Levofloxacin: R >4      -  Nitrofurantoin: S <=32 Should not be used to treat pyelonephritis.      -  Linezolid: S <=1      -  Vancomycin: R >16      -  Ciprofloxacin: R >2      -  Ampicillin: S <=2 Predicts results to ampicillin/sulbactam, amoxacillin-clavulanate and  piperacillin-tazobactam.      -  Tetracycline: R >8      Method Type: MARY ALICE    Culture - Blood (collected 01-13-25 @ 16:15)  Source: .Blood BLOOD  Preliminary Report:    No growth at 48 Hours    158 mg/dL (01-15 @ 05:30)  172 mg/dL (01-15 @ 08:17)  221 mg/dL (01-15 @ 13:09)  212 mg/dL (01-15 @ 17:27)  193 mg/dL (01-15 @ 21:46)  175 mg/dL (01-16 @ 05:30)    RADIOLOGY:       Activity - Bedrest (01-13-25 @ 18:38) [Active]  Diet, Consistent Carbohydrate/No Snacks (01-14-25 @ 12:56) [Active]        Social History:    Outpatient Providers list - N/A      Allergies    citrus (Urticaria)  Bactrim (Rash)    Intolerances      Home Medications:  acetaminophen 325 mg oral tablet: 2 tab(s) orally every 6 hours As needed Temp greater or equal to 38C (100.4F), Mild Pain (1 - 3) (19 Nov 2024 16:03)  amLODIPine 10 mg oral tablet: 1 tab(s) orally every 24 hours (19 Nov 2024 16:03)  atorvastatin 40 mg oral tablet: 1 tab(s) orally once a day (at bedtime) (01 Aug 2024 15:05)  insulin glargine 100 units/mL subcutaneous solution: 26 unit(s) subcutaneous once a day (at bedtime) (19 Nov 2024 16:03)  insulin lispro 100 units/mL injectable solution: 14 unit(s) subcutaneous 3 times a day (before meals) (19 Nov 2024 16:03)     MEDICATIONS  (STANDING):  ampicillin  IVPB 500 milliGRAM(s) IV Intermittent every 6 hours  atorvastatin 40 milliGRAM(s) Oral at bedtime  dextrose 5%. 1000 milliLiter(s) (50 mL/Hr) IV Continuous <Continuous>  dextrose 5%. 1000 milliLiter(s) (100 mL/Hr) IV Continuous <Continuous>  dextrose 50% Injectable 25 Gram(s) IV Push once  dextrose 50% Injectable 12.5 Gram(s) IV Push once  dextrose 50% Injectable 25 Gram(s) IV Push once  glucagon  Injectable 1 milliGRAM(s) IntraMuscular once  heparin   Injectable 5000 Unit(s) SubCutaneous every 8 hours  insulin glargine Injectable (LANTUS) 16 Unit(s) SubCutaneous at bedtime  insulin lispro (ADMELOG) corrective regimen sliding scale   SubCutaneous Before meals and at bedtime  insulin lispro Injectable (ADMELOG) 7 Unit(s) SubCutaneous three times a day before meals  NIFEdipine XL 60 milliGRAM(s) Oral every 24 hours  senna 2 Tablet(s) Oral at bedtime    MEDICATIONS  (PRN):  acetaminophen     Tablet .. 650 milliGRAM(s) Oral every 6 hours PRN Temp greater or equal to 38C (100.4F), Mild Pain (1 - 3)  dextrose Oral Gel 15 Gram(s) Oral once PRN Blood Glucose LESS THAN 70 milliGRAM(s)/deciliter  ondansetron Injectable 4 milliGRAM(s) IV Push every 8 hours PRN Nausea and/or Vomiting  polyethylene glycol 3350 17 Gram(s) Oral daily PRN Constipation   acetaminophen     Tablet ..   650 milliGRAM(s) Oral (01-15-25 @ 17:12)    ampicillin  IVPB   104 mL/Hr IV Intermittent (01-16-25 @ 07:01)   104 mL/Hr IV Intermittent (01-15-25 @ 23:57)   104 mL/Hr IV Intermittent (01-15-25 @ 18:00)   104 mL/Hr IV Intermittent (01-15-25 @ 12:24)   104 mL/Hr IV Intermittent (01-15-25 @ 06:34)   104 mL/Hr IV Intermittent (01-14-25 @ 21:51)    atorvastatin   40 milliGRAM(s) Oral (01-15-25 @ 22:17)   40 milliGRAM(s) Oral (01-14-25 @ 21:51)    cefTRIAXone   IVPB   100 mL/Hr IV Intermittent (01-14-25 @ 18:26)    heparin   Injectable   5000 Unit(s) SubCutaneous (01-16-25 @ 06:59)   5000 Unit(s) SubCutaneous (01-15-25 @ 22:17)   5000 Unit(s) SubCutaneous (01-15-25 @ 13:16)   5000 Unit(s) SubCutaneous (01-15-25 @ 06:36)   5000 Unit(s) SubCutaneous (01-14-25 @ 21:51)   5000 Unit(s) SubCutaneous (01-14-25 @ 15:49)    hydrALAZINE Injectable   5 milliGRAM(s) IV Push (01-14-25 @ 12:57)    insulin glargine Injectable (LANTUS)   16 Unit(s) SubCutaneous (01-15-25 @ 22:18)   16 Unit(s) SubCutaneous (01-14-25 @ 21:50)    insulin lispro (ADMELOG) corrective regimen sliding scale   2 Unit(s) SubCutaneous (01-15-25 @ 22:17)   4 Unit(s) SubCutaneous (01-15-25 @ 18:00)   4 Unit(s) SubCutaneous (01-15-25 @ 13:16)   2 Unit(s) SubCutaneous (01-15-25 @ 08:49)   6 Unit(s) SubCutaneous (01-14-25 @ 21:48)    insulin lispro Injectable (ADMELOG)   7 Unit(s) SubCutaneous (01-15-25 @ 18:01)   7 Unit(s) SubCutaneous (01-15-25 @ 13:16)   7 Unit(s) SubCutaneous (01-15-25 @ 08:49)   7 Unit(s) SubCutaneous (01-14-25 @ 16:41)    NIFEdipine XL   60 milliGRAM(s) Oral (01-14-25 @ 18:36)    NIFEdipine XL   60 milliGRAM(s) Oral (01-15-25 @ 18:00)    potassium chloride   Powder   40 milliEquivalent(s) Oral (01-14-25 @ 18:26)   40 milliEquivalent(s) Oral (01-14-25 @ 15:49)

## 2025-01-16 NOTE — DISCHARGE NOTE PROVIDER - HOSPITAL COURSE
#Discharge: do not delete  Visit Summary  HOSPITAL COURSE: 74 year old female with PMHx of lung cancer (w/ mets to brain), CKD, HLD, DM2, RA (follows with Dr. Johnston), RLE DVT (off Eliquis d/t frequent falls), prior hospitalization for confusion, BIBEMS for AMS and unwitnessed fall, found to be hypertensive to 190s-200s and severely agitated in the ED. Received ativan 1mg x1, Lopressor 5mg IVP x1, 1L NS x1. XR Pelvis performed showed dislocation of the patient's right total hip arthroplasty with osseous. Orthopedic surgery was consulted and they performed a R hip reduction while in the ED, and pt was admitted to [facility] for closer monitoring. In the unit, SBP of 170 and pt wasn't able to tolerate PO so was given hydralazine 2.5mg IVP. Another dose of hydral 10mg IVP was given along with 1L LR over 3 hours. Standing hydral q6h ordered. Pt is s/p 1 straight cath now given BS >500cc, repeat BS this afternoon ~100cc. While in the unit, patient also passed dysphagia screen and was started on CC diet with evening snack. No further surgical intervention per ortho, appreciate further recs. Her altered mentation has improved, she has been hemodynamically stable, and pain resolved, and stable for disposition to [facility].     Hospital course (by problem; priority based):  #Dislocation of hip, right, closed.   74F with PMH of lung cancer (w/ mets to brain), CKD, HLD, DM2, RA (follows with Dr. Johnston), RLE DVT (off Eliquis d/t frequent falls), prior hospitalization for confusion, BIBEMS for AMS and unwitnessed fall, found to be hypertensive to 190s-200s in ED. Daughter (Chloe) relayed history via phone given patients AMS. Per daughter:   This morning patient's HHA (rotating HHAs ~5hrs/day at least 3days/week) saw the pt who was reportedly acting normal around 9AM, then went to store to get a few things for patient. When HHA came back about 1hr later, began knocking on door but patient did not answer. HHA called pt daughter, who called her mother, but patient would not answer phone. Per daughter, HHA heard patient yell from bedroom that she won't come to door, that DONELL should go home; saying "I'm trying to go to sleep". DONELL then called 911/Firefighters who broke door down and found patient lying on floor of her bedroom. Patient apparently did not realize where she was, appeared to think she was in bed. When asked by police, patient only reported "little bit of pain", thus BIBEMS to hospital.  R pelvis: Dislocation of the patient's right total hip arthroplasty with osseous. The change at the lateral aspect of the iliac bone and acetabulum likely chronic in nature. The left hip is normal in appearance. Calcified uterine fibroid. Degenerative changes of the lumbar spine. Vascular calcification. No fractures.  Ortho consulted in ED, s/p closed reduction confirmed by post-reduction XRs   - No surgical intervention indicated  - KI and Abduction pillow  - Posterior hip precautions  - Possible abduction orthosis fitting  - pain mgmt: Tylenol PRN    #UTI   Hx of recurrent GNR UTIs with MDR. Has had VRE sensitive to ampicillin (10/2024) in the past as well Klebsiella sensitive to CTX (07/2024), Klebsiella sensitive only to carbapenems (12/2023). Now with leukocytosis, UA positive for bacteria/WBC, UCx: >100k Enterococcus, and cultures with same VRE that is sensitive to ampicillin from sensitivities resulting 1/15, now symptoms and labs resolved with ampicilin course, and stable for outpatient followup.   RESOLVED    Patient was discharged to:  [facility]    New medications: N/A       Items to follow up as outpatient:  PCP followup, Orthopedics team followup     Physical exam at the time of discharge:   AOx3 (mental status at baseline), dry MM, white plaques on tongue, no m/r/g, soft abd, NTND, right hip tenderness s/p RLE nail placement, warm & DP intact, strength RLE 2/5 LLE 5/5, normal reflexes   #Discharge: do not delete  Visit Summary  HOSPITAL COURSE: 74 year old female with PMHx of lung cancer (w/ mets to brain), CKD, HLD, DM2, RA (follows with Dr. Johnston), RLE DVT (off Eliquis d/t frequent falls), prior hospitalization for confusion, BIBEMS for AMS and unwitnessed fall, found to be hypertensive to 190s-200s and severely agitated in the ED. Received ativan 1mg x1, Lopressor 5mg IVP x1, 1L NS x1. XR Pelvis performed showed dislocation of the patient's right total hip arthroplasty with osseous. Orthopedic surgery was consulted and they performed a R hip reduction while in the ED, and pt was admitted to [facility] for closer monitoring. In the unit, SBP of 170 and pt wasn't able to tolerate PO so was given hydralazine 2.5mg IVP. Another dose of hydral 10mg IVP was given along with 1L LR over 3 hours. Standing hydral q6h ordered. Pt is s/p 1 straight cath now given BS >500cc, repeat BS this afternoon ~100cc. While in the unit, patient also passed dysphagia screen and was started on CC diet with evening snack. No further surgical intervention per ortho, appreciate further recs. Her altered mentation has improved, she has been hemodynamically stable, and pain resolved, and stable for disposition to [facility].     Hospital course (by problem; priority based):  #Dislocation of hip, right, closed.   74F with PMH of lung cancer (w/ mets to brain), CKD, HLD, DM2, RA (follows with Dr. Johnston), RLE DVT (off Eliquis d/t frequent falls), prior hospitalization for confusion, BIBEMS for AMS and unwitnessed fall, found to be hypertensive to 190s-200s in ED. Daughter (Chloe) relayed history via phone given patients AMS. Per daughter:   This morning patient's HHA (rotating HHAs ~5hrs/day at least 3days/week) saw the pt who was reportedly acting normal around 9AM, then went to store to get a few things for patient. When HHA came back about 1hr later, began knocking on door but patient did not answer. HHA called pt daughter, who called her mother, but patient would not answer phone. Per daughter, HHA heard patient yell from bedroom that she won't come to door, that DONELL should go home; saying "I'm trying to go to sleep". DONELL then called 911/Firefighters who broke door down and found patient lying on floor of her bedroom. Patient apparently did not realize where she was, appeared to think she was in bed. When asked by police, patient only reported "little bit of pain", thus BIBEMS to hospital.  R pelvis: Dislocation of the patient's right total hip arthroplasty with osseous. The change at the lateral aspect of the iliac bone and acetabulum likely chronic in nature. The left hip is normal in appearance. Calcified uterine fibroid. Degenerative changes of the lumbar spine. Vascular calcification. No fractures.  Ortho consulted in ED, s/p closed reduction confirmed by post-reduction XRs   - No surgical intervention indicated  - KI and Abduction pillow  - Posterior hip precautions  - Possible abduction orthosis fitting  - pain mgmt: Tylenol PRN    #UTI   Hx of recurrent GNR UTIs with MDR. Has had VRE sensitive to ampicillin (10/2024) in the past as well Klebsiella sensitive to CTX (07/2024), Klebsiella sensitive only to carbapenems (12/2023). Now with leukocytosis, UA positive for bacteria/WBC, UCx: >100k Enterococcus, and cultures with same VRE that is sensitive to ampicillin from sensitivities resulting 1/15, now symptoms and labs resolved with ampicilin course, and stable for outpatient followup.   Continue antibiotic course w/ PO Augmentin 875mg q12h x4 more days    Patient was discharged to:  [facility]    New medications: N/A       Items to follow up as outpatient:  PCP followup, Orthopedics team followup     Physical exam at the time of discharge:   AOx3 (mental status at baseline), dry MM, white plaques on tongue, no m/r/g, soft abd, NTND, right hip tenderness s/p RLE nail placement, warm & DP intact, strength RLE 2/5 LLE 5/5, normal reflexes   #Discharge: do not delete  Visit Summary  74 year old female with PMHx of lung cancer (w/ mets to brain), CKD, HLD, DM2, RA (follows with Dr. Johnston), RLE DVT (off Eliquis d/t frequent falls), prior hospitalization for confusion, BIBEMS for AMS and unwitnessed fall, found to be hypertensive to 190s-200s and severely agitated in the ED. Received ativan 1mg x1, Lopressor 5mg IVP x1, 1L NS x1. XR Pelvis performed showed dislocation of the patient's right total hip arthroplasty with osseous. Orthopedic surgery was consulted and they performed a R hip reduction while in the ED, and pt was admitted to [facility] for closer monitoring. In the unit, SBP of 170 and pt wasn't able to tolerate PO so was given hydralazine 2.5mg IVP. Another dose of hydral 10mg IVP was given along with 1L LR over 3 hours. Standing hydral q6h ordered. Pt is s/p 1 straight cath now given BS >500cc, repeat BS this afternoon ~100cc. While in the unit, patient also passed dysphagia screen and was started on CC diet with evening snack. No further surgical intervention per ortho, appreciate further recs. Her altered mentation has improved, she has been hemodynamically stable, and pain resolved, and stable for disposition to [facility].     Hospital course (by problem; priority based):  #Dislocation of hip, right, closed.   74F with PMH of lung cancer (w/ mets to brain), CKD, HLD, DM2, RA (follows with Dr. Johnston), RLE DVT (off Eliquis d/t frequent falls), prior hospitalization for confusion, BIBEMS for AMS and unwitnessed fall, found to be hypertensive to 190s-200s in ED. Daughter (Chloe) relayed history via phone given patients AMS. Per daughter:   This morning patient's HHA (rotating HHAs ~5hrs/day at least 3days/week) saw the pt who was reportedly acting normal around 9AM, then went to store to get a few things for patient. When HHA came back about 1hr later, began knocking on door but patient did not answer. HHA called pt daughter, who called her mother, but patient would not answer phone. Per daughter, HHA heard patient yell from bedroom that she won't come to door, that HHA should go home; saying "I'm trying to go to sleep". HHA then called 911/Firefighters who broke door down and found patient lying on floor of her bedroom. Patient apparently did not realize where she was, appeared to think she was in bed. When asked by police, patient only reported "little bit of pain", thus BIBEMS to hospital.  R pelvis: Dislocation of the patient's right total hip arthroplasty with osseous. The change at the lateral aspect of the iliac bone and acetabulum likely chronic in nature. The left hip is normal in appearance. Calcified uterine fibroid. Degenerative changes of the lumbar spine. Vascular calcification. No fractures.  Ortho consulted in ED, s/p closed reduction confirmed by post-reduction XRs   - No surgical intervention indicated  - KI and Abduction pillow  - Posterior hip precautions  - Possible abduction orthosis fitting  - pain mgmt: Tylenol PRN    #UTI   #DUNIA on CKD  #History of CKD  Hx of recurrent GNR UTIs with MDR. Has had VRE sensitive to ampicillin (10/2024) in the past as well Klebsiella sensitive to CTX (07/2024), Klebsiella sensitive only to carbapenems (12/2023). Now with leukocytosis, UA positive for bacteria/WBC, UCx: >100k Enterococcus, and cultures with same VRE that is sensitive to ampicillin from sensitivities resulting 1/15, now symptoms and labs resolved with ampicilin course, and stable for outpatient followup. She was started on 7 day Ampicillin course, with sensitivities showing response to ampicillin  needs 28 doses q6h ampicillin 500mg IVPB doses (thus far got 13/28) -> Augmentin BID PO course, complete 7d day: 14 doses left  -Continue antibiotic course w/ PO Augmentin 875mg q12h x3 more days, starting 1/18/25 till 1/20/25, with last 2 doses to be completed on that 1/20/25 day  -Outpatient followup with nephrology 01/29/25 (10h00) Dr. Eric Vidal, [address]    Patient was discharged to:  [facility]    New medications: Augmentin 875mg q12h x3 more days, starting 1/18/25 till 1/20/25   Items to follow up as outpatient:  Nephrology, PCP followup, Orthopedics team followup     Physical exam at the time of discharge:   AOx3 (mental status at baseline), dry MM, white plaques on tongue, no m/r/g, soft abd, NTND, right hip tenderness s/p RLE nail placement, warm & DP intact, strength RLE 2/5 LLE 5/5, normal reflexes

## 2025-01-16 NOTE — CONSULT NOTE ADULT - PROBLEM SELECTOR RECOMMENDATION 4
Following for GOC. Goals established. Will sign off. Please reconsult for any clinical changes warranting further goals of care.

## 2025-01-16 NOTE — DISCHARGE NOTE PROVIDER - NSDCMRMEDTOKEN_GEN_ALL_CORE_FT
acetaminophen 325 mg oral tablet: 2 tab(s) orally every 6 hours As needed Temp greater or equal to 38C (100.4F), Mild Pain (1 - 3)  amLODIPine 10 mg oral tablet: 1 tab(s) orally every 24 hours  atorvastatin 40 mg oral tablet: 1 tab(s) orally once a day (at bedtime)  insulin glargine 100 units/mL subcutaneous solution: 26 unit(s) subcutaneous once a day (at bedtime)  insulin lispro 100 units/mL injectable solution: 14 unit(s) subcutaneous 3 times a day (before meals)   acetaminophen 325 mg oral tablet: 2 tab(s) orally every 6 hours As needed Temp greater or equal to 38C (100.4F), Mild Pain (1 - 3)  amLODIPine 10 mg oral tablet: 1 tab(s) orally every 24 hours  atorvastatin 40 mg oral tablet: 1 tab(s) orally once a day (at bedtime)  insulin glargine 100 units/mL subcutaneous solution: 17 unit(s) subcutaneous once a day (at bedtime)  insulin lispro 100 units/mL injectable solution: 14 unit(s) subcutaneous 3 times a day (before meals)  NIFEdipine 60 mg oral tablet, extended release: 1 tab(s) orally every 24 hours  polyethylene glycol 3350 oral powder for reconstitution: 17 gram(s) orally once a day As needed Constipation  senna leaf extract oral tablet: 2 tab(s) orally once a day (at bedtime)   acetaminophen 325 mg oral tablet: 2 tab(s) orally every 6 hours As needed Temp greater or equal to 38C (100.4F), Mild Pain (1 - 3)  amLODIPine 10 mg oral tablet: 1 tab(s) orally every 24 hours  amoxicillin 875 mg oral tablet: 1 tab(s) orally every 12 hours Completion of last 3/7 days of UTI treatment course  atorvastatin 40 mg oral tablet: 1 tab(s) orally once a day (at bedtime)  insulin glargine 100 units/mL subcutaneous solution: 17 unit(s) subcutaneous once a day (at bedtime)  insulin lispro 100 units/mL injectable solution: 14 unit(s) subcutaneous 3 times a day (before meals)  NIFEdipine 60 mg oral tablet, extended release: 1 tab(s) orally every 24 hours  polyethylene glycol 3350 oral powder for reconstitution: 17 gram(s) orally once a day As needed Constipation  senna leaf extract oral tablet: 2 tab(s) orally once a day (at bedtime)

## 2025-01-16 NOTE — DISCHARGE NOTE PROVIDER - CARE PROVIDER_API CALL
America Esparza  Endocrinology/Metab/Diabetes  [address]  [place] [address]  Phone: [phone]  Fax: [phone]  Follow Up Time:

## 2025-01-16 NOTE — PROGRESS NOTE ADULT - SUBJECTIVE AND OBJECTIVE BOX
Physical Medicine and Rehabilitation Progress Note :       Patient is a 74y old  Female who presents with a chief complaint of AMS and fall (16 Jan 2025 10:49)      HPI:  74F with PMH of lung cancer (w/ mets to brain), CKD, HLD, DM2, RA (follows with Dr. Johnston), RLE DVT (off Eliquis d/t frequent falls), prior hospitalization for confusion, BIBEMS for AMS and unwitnessed fall, found to be hypertensive to 190s-200s in ED. Daughter (Chloe) relayed history via phone given patients AMS. Per daughter:   This morning patient's HHA (rotating HHAs ~5hrs/day at least 3days/week) saw the pt who was reportedly acting normal around 9AM, then went to store to get a few things for patient. When HHA came back about 1hr later, began knocking on door but patient did not answer. HHA called pt daughter, who called her mother, but patient would not answer phone. Per daughter, HHA heard patient yell from bedroom that she won't come to door, that HHA should go home; saying "I'm trying to go to sleep". HHA then called 911/Firefighters who broke door down and found patient lying on floor of her bedroom. Patient apparently did not realize where she was, appeared to think she was in bed. When asked by police, patient only reported "little bit of pain", thus BIBEMS to hospital.    Daughter further reports that at baseline patient is largely functional; she walks with rollator, but sometimes walks without any assisted device in her apartment. Patient cooks for herself mostly, but daughter also brings her food. Overall reports patient has good degree of functionality but has had progressive memory deficits for past several months; for example patient washes her clothes in laundry herself; but sometimes forgets to take them out and hang them up. Has had discrete episodes of confusion in the past per daughter, which she states is unclear if 2/2 dementia vs brain mets.     In ED, patient was noted to be very confused and combative; was given lorazepam prior to arrival to tele floor.     ED COURSE  Initial vital signs: T: 98.8 F, HR: 92, BP: 205/95, R: 18, SpO2: 91% on RA  Labs: significant for WBC 23.33, Neutrophil 81.6%, PT 13.9, INR 1.19, aPTT 27.0, K 3.4, BUN 27, Cr 2.07, Gluc 220, eGFR 25, U/A: turbid, spec grav 1.015, Protein 300, Nitrite negative, Large LE, pH 6.0, , RBC 5, Many bacteria, Cast 1, Present WBC clumps   Imaging:  CTH: No evidence of acute intracranial hemorrhage or midline shift. No acute abnormality. 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  CXR: There is a right-sided Mediport with its tip in the SVC. The heart is normal in size. The lungs are clear.  EKG: NSR, L anterior fascicular block, LVH?, HR 92,   XR Pelvis: Dislocation of the patient's right total hip arthroplasty with osseous. The change at the lateral aspect of the iliac bone and acetabulum likely chronic in nature. The left hip is normal in appearance. Calcified uterine fibroid. Degenerative changes of the lumbar spine. Vascular calcification. No fractures.  Medications: Ofirmev 1g x1, CTX 1g x1, 2U Lispro, Ativan 1mg x1, Lopressor 5mg IVP x1, 1L NS x1  Consults: Ortho --> reduced R hip dislocation   (13 Jan 2025 19:16)                            10.2   11.15 )-----------( 176      ( 16 Jan 2025 05:30 )             33.1       01-16    139  |  107  |  44[H]  ----------------------------<  175[H]  4.3   |  20[L]  |  2.27[H]    Ca    8.4      16 Jan 2025 05:30  Phos  2.5     01-16  Mg     1.9     01-16      Vital Signs Last 24 Hrs  T(C): 37.4 (16 Jan 2025 06:01), Max: 37.4 (15 Jaime 2025 21:02)  T(F): 99.3 (16 Jan 2025 06:01), Max: 99.3 (15 Jaime 2025 21:02)  HR: 92 (16 Jan 2025 06:01) (84 - 93)  BP: 131/62 (16 Jan 2025 06:01) (110/64 - 131/62)  BP(mean): --  RR: 18 (16 Jan 2025 06:01) (18 - 18)  SpO2: 95% (16 Jan 2025 06:01) (93% - 95%)    Parameters below as of 15 Jaime 2025 21:02  Patient On (Oxygen Delivery Method): room air        MEDICATIONS  (STANDING):  ampicillin  IVPB 500 milliGRAM(s) IV Intermittent every 6 hours  atorvastatin 40 milliGRAM(s) Oral at bedtime  dextrose 5%. 1000 milliLiter(s) (50 mL/Hr) IV Continuous <Continuous>  dextrose 5%. 1000 milliLiter(s) (100 mL/Hr) IV Continuous <Continuous>  dextrose 50% Injectable 25 Gram(s) IV Push once  dextrose 50% Injectable 12.5 Gram(s) IV Push once  dextrose 50% Injectable 25 Gram(s) IV Push once  glucagon  Injectable 1 milliGRAM(s) IntraMuscular once  heparin   Injectable 5000 Unit(s) SubCutaneous every 8 hours  insulin glargine Injectable (LANTUS) 16 Unit(s) SubCutaneous at bedtime  insulin lispro (ADMELOG) corrective regimen sliding scale   SubCutaneous Before meals and at bedtime  insulin lispro Injectable (ADMELOG) 7 Unit(s) SubCutaneous three times a day before meals  NIFEdipine XL 60 milliGRAM(s) Oral every 24 hours  senna 2 Tablet(s) Oral at bedtime    MEDICATIONS  (PRN):  acetaminophen     Tablet .. 650 milliGRAM(s) Oral every 6 hours PRN Temp greater or equal to 38C (100.4F), Mild Pain (1 - 3)  dextrose Oral Gel 15 Gram(s) Oral once PRN Blood Glucose LESS THAN 70 milliGRAM(s)/deciliter  ondansetron Injectable 4 milliGRAM(s) IV Push every 8 hours PRN Nausea and/or Vomiting  polyethylene glycol 3350 17 Gram(s) Oral daily PRN Constipation      T(C): 37.4 (01-16-25 @ 06:01), Max: 37.4 (01-15-25 @ 21:02)  HR: 92 (01-16-25 @ 06:01) (84 - 93)  BP: 131/62 (01-16-25 @ 06:01) (110/64 - 131/62)  RR: 18 (01-16-25 @ 06:01) (18 - 18)  SpO2: 95% (01-16-25 @ 06:01) (93% - 95%)    Physical Exam:    74 y o woman lying comfortably in semi Zee's position , awake , alert , no acute complaints     Head: normocephalic , atraumatic    Eyes: PERRLA , EOMI , no nystagmus , sclera anicteric    ENT / FACE: neg nasal discharge , uvula midline , no oropharyngeal erythema / exudate    Neck: supple , negative JVD , negative carotid bruits , no thyromegaly    Chest:  R mediport , CTA bilaterally     Cardiovascular: regular rate and rhythm , neg murmurs / rubs / gallops    Abdomen: soft , non distended , no tenderness to palpation in all 4 quadrants ,  normal bowel sounds     Extremities:  KI and abd pillow in place     Neurologic Exam:     Alert and oriented to person , place , date/year , speech fluent w/o dysarthria     Cranial Nerves:           II:                         pupils equal , round and reactive to light , visual fields intact         III/ IV/VI:             extraocular movements intact , neg nystagmus , neg ptosis        V:                        facial sensation intact , V1-3 normal        VII:                      face symmetric , no droop , normal eye closure and smile        VIII:                     hearing intact to finger rub bilaterally        IX and X:             no hoarseness , gag intact , palate/ uvula rise symmetrically        XI:                       SCM / trapezius strength intact bilateral        XII:                      no tongue deviation    Motor Exam:        > 3+/5 x 4 extremities , without drift     Sensation:         intact to light touch x 4 extremities                            no neglect or extinction on double simultaneous testing    DTR:           biceps/brachioradialis: equal                            patella/ankle: equal          neg Babinski     Coordination:            Finger to Nose:  neg dysmetria bilaterally        Initial Functional Status Assessment :       Previous Level of Function:     · Ambulation Skills	needs device  · Transfer Skills	needed assist  · ADL Skills	needed assist  · Work/Leisure Activity	needs device  · Additional Comments	Pt reports living alone in an elevator building with no steps to enter/ Pt reports having HHA x 7 days for 3 hours a day assisting in ADLS. Pt ambulates with a RW and uses shower chair and grab bars.    Cognitive Status Examination:   · Orientation	oriented to person, place, time and situation  · Level of Consciousness	alert  · Follows Commands and Answers Questions	able to follow single-step instructions  · Personal Safety and Judgment	impaired; pt unable to maintain hip precautions and req cues to recall    Range of Motion Exam:   · Range of Motion Examination	deficits as listed below; R LE in KI    Manual Muscle Testing:   · Manual Muscle Testing Results	grossly assessed due to  pain and R LE KI, grossly 3/5    Bed Mobility: Rolling/Turning:     · Level of [place]	moderate assist (50% patients effort)  · Physical Assist/Nonphysical Assist	1 person assist; verbal cues    Bed Mobility: Scooting/Bridging:     · Level of [place]	moderate assist (50% patients effort)  · Physical Assist/Nonphysical Assist	1 person assist; verbal cues    Bed Mobility: Sit to Supine:     · Level of [place]	moderate assist (50% patients effort)  · Physical Assist/Nonphysical Assist	1 person assist; verbal cues    Bed Mobility: Supine to Sit:     · Level of [place]	moderate assist (50% patients effort)  · Physical Assist/Nonphysical Assist	1 person assist; verbal cues    Bed Mobility Analysis:     · Bed Mobility Limitations	decreased ability to use arms for pushing/pulling; decreased ability to use legs for bridging/pushing  · Impairments Contributing to Impaired Bed Mobility	impaired balance; pain; decreased strength    Transfer: Sit to Stand:     · Level of [place]	minimum assist (75% patients effort)  · Physical Assist/Nonphysical Assist	2 person assist; verbal cues  · Weight-Bearing Restrictions	weight-bearing as tolerated; RLE WBAT with KI  	  · Assistive Device	rolling walker    Transfer: Stand to Sit:     · Level of [place]	minimum assist (75% patients effort)  · Physical Assist/Nonphysical Assist	verbal cues; 1 person assist  · Weight-Bearing Restrictions	weight-bearing as tolerated; R LE WBAT with KI  	  · Assistive Device	rolling walker    Sit/Stand Transfer Safety Analysis:     · Transfer Safety Concerns Noted	decreased weight-shifting ability  · Impairments Contributing to Impaired Transfers	impaired balance; pain; decreased strength    Gait Skills:     · Level of [place]	unable to perform; 2/2 pain in RLE    Balance Skills Assessment:     · Sitting Balance: Static	good balance  · Sitting Balance: Dynamic	good balance  · Sit-to-Stand Balance	fair minus  · Standing Balance: Static	fair minus  · Standing Balance: Dynamic	fair minus    Clinical Impressions:   · Criteria for Skilled Therapeutic Interventions	impairments found; functional limitations in following categories; risk reduction/prevention; rehab potential; therapy frequency; anticipated discharge recommendation  · Impairments Found (describe specific impairments)	aerobic capacity/endurance; gait, locomotion, and balance; gross motor; muscle strength  · Functional Limitations in Following Categories (describe specific limitations)	self-care; home management; community/leisure  · Risk Reduction/Prevention (Describe Specific Areas of risk reduction/prevention)	risk factors  · Risk Areas	fall; safety awareness    · Balance Skills	pt. tolerated dangle at EOB ~10 minutes w. SBA, no LOB Noted. Pt. tolerated stand ~2 minutes for pericare w. Min Ax2, unable to weight shift to engage in functional mob at this time      Upper Body Dressing Training:     · Level of [place]	minimum assist (75% patients effort)  · Physical Assist/Nonphysical Assist	1 person assist; nonverbal cues (demo/gestures); verbal cues    Lower Body Dressing Training:     · Level of [place]	maximum assist (25% patients effort)  · Physical Assist/Nonphysical Assist	1 person assist; nonverbal cues (demo/gestures); verbal cues    Toilet Hygiene Training:     · Level of [place]	maximum assist (25% patients effort)  · Physical Assist/Nonphysical Assist	1 person assist; nonverbal cues (demo/gestures); verbal cues          PM&R Impression : as above    Current disposition plan recommendation :    subacute rehab placement

## 2025-01-16 NOTE — DIETITIAN INITIAL EVALUATION ADULT - EDUCATION DIETARY MODIFICATIONS
Encouraged continued adequate intake with emphasis on protein foods. Reviewed nutrition therapy for blood glucose control, discussed strategies to incorporate to promote therapeutic diet compliance. Pt aware RD remains available for additional questions/concerns./(2) meets goals/outcomes/verbalization

## 2025-01-16 NOTE — PROGRESS NOTE ADULT - TIME BILLING
Patient seen and examined;  Continue Ampicillin  Discuss with ortho weight bearing status   Will need placement in SNF
Patient seen and examined;  DUNIA no change;  Antibiotics noted;  Physical therapy;  WBAT   Will contact daughter today
Bedside exam and interview      Reviewed vitals, labs      Discussed patient's plan of care with housestaff      Documentation of encounter     Excludes teaching time and/or separately reported services.

## 2025-01-16 NOTE — DIETITIAN INITIAL EVALUATION ADULT - OTHER CALCULATIONS
Estimated needs based on IBW as current wt 143lb/64.7kg >120% IBW (124%). Needs adjusted for age, BMI, lung cancer, and clinical status.

## 2025-01-16 NOTE — DIETITIAN INITIAL EVALUATION ADULT - PERTINENT LABORATORY DATA
01-16    139  |  107  |  44[H]  ----------------------------<  175[H]  4.3   |  20[L]  |  2.27[H]    Ca    8.4      16 Jan 2025 05:30  Phos  2.5     01-16  Mg     1.9     01-16    POCT Blood Glucose.: 208 mg/dL (01-16-25 @ 12:13)  A1C with Estimated Average Glucose Result: 9.0 % (01-14-25 @ 05:30)  A1C with Estimated Average Glucose Result: 10.5 % (11-13-24 @ 17:20)  A1C with Estimated Average Glucose Result: 12.4 % (07-31-24 @ 05:30)

## 2025-01-16 NOTE — DIETITIAN INITIAL EVALUATION ADULT - ADD RECOMMEND
1. Continue Consistent Carbohydrate diet.   2. Monitor GI tolerance, weight trends, labs, and skin integrity.  3. Defer bowel and pain regimens to team.   4. RD to remain available for diet education/intervention prn.

## 2025-01-16 NOTE — CONSULT NOTE ADULT - CONVERSATION DETAILS
Discussed code status with patient. She expressed a clear wish to be full code, that if her heart stopped or she required respiratory support, she would want intubation and CPR.

## 2025-01-16 NOTE — PROGRESS NOTE ADULT - ASSESSMENT
I M    74 y o F with PMH of lung cancer (w/ mets to brain), CKD, HLD, DM2, RA (follows with Dr. Johnston), RLE DVT (off Eliquis d/t frequent falls), prior hospitalization for confusion, BIBEMS for AMS and unwitnessed fall, found to be hypertensive to 190s-200s in ED. c/c/b VRE UTI, on ampicillin. AMS improved and BP controlled with IV hydralazine, transitioned to po nifedipine.     Problem/Plan - 1:  ·  Problem: Hypertensive urgency.   ·  Plan: Home meds per superscripts: lisinopril 10mg qd, nifedipine er 60mg qd. Per outpatient chart review Dec 2024, was meant to stop nifedipine & lisinopril and start amlodipine 10mg; does not appear patient did so.  TTE 7/2023: EF 60-65%, Grade I Diastolic Dysfunction. Changes consistent with LVH noted on EKG, LVH noted on prior echo.  sBP 190-200 in ED, s/p IV Lopressor and IV Hydral.   1/14 TTE: moderate-to-severe concentric LVH. Left ventricular ejection fraction is 55-60%. Trace AR, mild MR, mild NM  - s/p nifedipine 60mg x1 on 1/14 PM -> PM sBP 130  - c/w nifedipine 60mg q24h - caution with rapid BP reduction  - Holding home lisinopril.    Problem/Plan - 2:  ·  Problem: Sepsis.   ·  Plan: Patient met sepsis criteria on admission: 2/4 SIRS criteria (WBC >12, HR >90) + UTI source. s/p 2L fluids.  - 1/13 BCx NGTD  - rest of plan as below.    Problem/Plan - 3:  ·  Problem: Acute UTI.   ·  Plan: Hx of recurrent GNR UTIs with MDR. Has had VRE sensitive to ampicillin (10/2024) in the past as well Klebsiella sensitive to CTX (07/2024), Klebsiella sensitive only to carbapenems (12/2023). Now with leukocytosis and + u/a.   UCx: >100k Enterococcus    - c/w ampicillin 500mg q6h  - c/w CTX 2g qd   - f/u UCx and sensitivities  - trend WBC.    Problem/Plan - 4:  ·  Problem: Fall.   ·  Plan: Pt found down and confused at her apartment by HHA. Found to have R hip dislocation in ED.   Cause unclear but include sepsis vs hypertensive urgency/emergency vs orthostatic hypotension. Cardiac etiology less likely (no arrythmia seen while being monitored on tele, TTE   - f/u orthostatic vitals  - plan as below.    Problem/Plan - 5:  ·  Problem: Dislocation of hip, right, closed.   ·  Plan: R pelvis: Dislocation of the patient's right total hip arthroplasty with osseous. The change at the lateral aspect of the iliac bone and acetabulum likely chronic in nature. The left hip is normal in appearance. Calcified uterine fibroid. Degenerative changes of the lumbar spine. Vascular calcification. No fractures.  Ortho consulted in ED, s/p closed reduction confirmed by post-reduction XRs   - No surgical intervention indicated  - KI and Abduction pillow  - Posterior hip precautions  - Possible abduction orthosis fitting  - pain mgmt: Tylenol PRN.    Problem/Plan - 6:  ·  Problem: Acute kidney injury superimposed on CKD.   ·  Plan: Initial Cr 2.21 (presumed baseline: 1.18-1.32 (08-10/2024). Possibly pre-renal (iso sepsis) vs post-renal (urinary retention on admission).  s/p 2L fluids.   - f/u uLytes  - q6h bladder scans    #Hypoxia, RESOLVED.  91% on RA on admission, now satting at 96-97% on RA, seen to be breathing comfortably, with CTABL and CXR overall clear. RVP neg.    Problem/Plan - 7:  ·  Problem: Insulin dependent type 2 diabetes mellitus.   ·  Plan: A1c 9.0.   - miSS.    Problem/Plan - 8:  ·  Problem: Hyperlipidemia.   ·  Plan: Home med atorvastatin 40mg qd.   - c/w home med.    Problem/Plan - 9:  ·  Problem: Right leg DVT.   ·  Plan: Per chart review, patient with hx of RLE DVT and has had swelling of BL LE. Eliquis was discontinued given frequent falls.  - Doppler negative for DVTs.    Problem/Plan - 10:  ·  Problem: Non-small cell lung cancer.   ·  Plan; NSCLC - BMs s/p SRS, liver met.  Follows closely with Dr. Delaney, had appointment 12/10/24 and received gemcitabine. Per chart review: single agent gemcitabine with therapy, given on day 1 and day 8 of each 21-day cycle. "patient is on therapy with gemcitabine requiring intensive monitoring for toxicity such as cytopenias with CBC, CMP Q 2-3 wks"  - can consider heme/onc consult    #RA  Follows with Dr. Johnston. Per chart review, intermittently on 2mg decadron during flares.  - CTM.    Problem/Plan - 11:  ·  Problem: At risk for nutrition deficiency.   ·  Plan: Mg 1.2, K 3.4, 1/14 note, likely 2/2 poor PO intake    Plan  - nutrition consult  - hypokalemia likely iso AoCKD.    Problem/Plan - 12:  ·  Problem: Prophylactic measure.   ·  Plan: F: none currently, s/p 2L  E: replete K>4 and Mg > 2  N: CC  GI: none  DVT: Heparin subq q8 given DUNIA on CKD  Dispo: [facility]; PT/OT - [facility].

## 2025-01-16 NOTE — CONSULT NOTE ADULT - PROBLEM SELECTOR RECOMMENDATION 2
Met NSCLC with  mets to brain and liver under care at [facility] with Dr. Delaney. Tolerating treatment well per patient.  - Will f/u outpatient with Dr. Delaney

## 2025-01-16 NOTE — CONSULT NOTE ADULT - TIME BILLING
Emotional Support/Supportive Care and Clarification of Potential Disease Trajectory related to metastatic cancer.  Exploration of GOC including advanced directives such as HCP designation and code status.

## 2025-01-16 NOTE — DISCHARGE NOTE PROVIDER - NSDCFUSCHEDAPPT_GEN_ALL_CORE_FT
America Esparza  [facility] Physician [facility]  ENDOCRIN [address]  Scheduled Appointment: 02/14/2025    Marissa Casarez  [facility]  HEARTVASC [address]  Scheduled Appointment: 03/13/2025     Eric Vidal  [facility]  NEPHRO [address]  Scheduled Appointment: 01/29/2025    America Esparza  [facility]  ENDOCRIN [address]  Scheduled Appointment: 02/14/2025    Marissa Casarez  [facility]  HEARTVASC [address]  Scheduled Appointment: 03/13/2025

## 2025-01-16 NOTE — CONSULT NOTE ADULT - SUBJECTIVE AND OBJECTIVE BOX
Patient is a 74y old  Female who presents with a chief complaint of AMS and fall (14 Jan 2025 07:13)      HPI:  74F with PMH of lung cancer (w/ mets to brain), CKD, HLD, DM2, RA (follows with Dr. Johnston), RLE DVT (off Eliquis d/t frequent falls), prior hospitalization for confusion, BIBEMS for AMS and unwitnessed fall, found to be hypertensive to 190s-200s in ED. Daughter (Chloe) relayed history via phone given patients AMS. Per daughter:   This morning patient's HHA (rotating HHAs ~5hrs/day at least 3days/week) saw the pt who was reportedly acting normal around 9AM, then went to store to get a few things for patient. When HHA came back about 1hr later, began knocking on door but patient did not answer. HHA called pt daughter, who called her mother, but patient would not answer phone. Per daughter, HHA heard patient yell from bedroom that she won't come to door, that HHA should go home; saying "I'm trying to go to sleep". HHA then called 911/Firefighters who broke door down and found patient lying on floor of her bedroom. Patient apparently did not realize where she was, appeared to think she was in bed. When asked by police, patient only reported "little bit of pain", thus BIBEMS to hospital.    Daughter further reports that at baseline patient is largely functional; she walks with rollator, but sometimes walks without any assisted device in her apartment. Patient cooks for herself mostly, but daughter also brings her food. Overall reports patient has good degree of functionality but has had progressive memory deficits for past several months; for example patient washes her clothes in laundry herself; but sometimes forgets to take them out and hang them up. Has had discrete episodes of confusion in the past per daughter, which she states is unclear if 2/2 dementia vs brain mets.     In ED, patient was noted to be very confused and combative; was given lorazepam prior to arrival to tele floor.     ED COURSE  Initial vital signs: T: 98.8 F, HR: 92, BP: 205/95, R: 18, SpO2: 91% on RA  Labs: significant for WBC 23.33, Neutrophil 81.6%, PT 13.9, INR 1.19, aPTT 27.0, K 3.4, BUN 27, Cr 2.07, Gluc 220, eGFR 25, U/A: turbid, spec grav 1.015, Protein 300, Nitrite negative, Large LE, pH 6.0, , RBC 5, Many bacteria, Cast 1, Present WBC clumps   Imaging:  CTH: No evidence of acute intracranial hemorrhage or midline shift. No acute abnormality. 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  CXR: There is a right-sided Mediport with its tip in the SVC. The heart is normal in size. The lungs are clear.  EKG: NSR, L anterior fascicular block, LVH?, HR 92,   XR Pelvis: Dislocation of the patient's right total hip arthroplasty with osseous. The change at the lateral aspect of the iliac bone and acetabulum likely chronic in nature. The left hip is normal in appearance. Calcified uterine fibroid. Degenerative changes of the lumbar spine. Vascular calcification. No fractures.  Medications: Ofirmev 1g x1, CTX 1g x1, 2U Lispro, Ativan 1mg x1, Lopressor 5mg IVP x1, 1L NS x1  Consults: Ortho --> reduced R hip dislocation   (13 Jan 2025 19:16)    PAST MEDICAL & SURGICAL HISTORY:  HTN (hypertension)      HLD (hyperlipidemia)      DM (diabetes mellitus), type 2      Rheumatoid arthritis      Degenerative joint disease (DJD) of lumbar spine      Lung cancer metastatic to brain      Stage 3 chronic kidney disease      S/P total right hip arthroplasty        MEDICATIONS  (STANDING):  ampicillin  IVPB 500 milliGRAM(s) IV Intermittent every 6 hours  cefTRIAXone   IVPB 1000 milliGRAM(s) IV Intermittent every 24 hours  dextrose 5%. 1000 milliLiter(s) (50 mL/Hr) IV Continuous <Continuous>  dextrose 5%. 1000 milliLiter(s) (100 mL/Hr) IV Continuous <Continuous>  dextrose 50% Injectable 25 Gram(s) IV Push once  dextrose 50% Injectable 12.5 Gram(s) IV Push once  dextrose 50% Injectable 25 Gram(s) IV Push once  glucagon  Injectable 1 milliGRAM(s) IntraMuscular once  heparin   Injectable 5000 Unit(s) SubCutaneous every 8 hours  hydrALAZINE Injectable 5 milliGRAM(s) IV Push every 6 hours  insulin lispro (ADMELOG) corrective regimen sliding scale   SubCutaneous Before meals and at bedtime  potassium chloride  10 mEq/100 mL IVPB 10 milliEquivalent(s) IV Intermittent every 1 hour    MEDICATIONS  (PRN):  acetaminophen     Tablet .. 650 milliGRAM(s) Oral every 6 hours PRN Temp greater or equal to 38C (100.4F), Mild Pain (1 - 3)  dextrose Oral Gel 15 Gram(s) Oral once PRN Blood Glucose LESS THAN 70 milliGRAM(s)/deciliter  ondansetron Injectable 4 milliGRAM(s) IV Push every 8 hours PRN Nausea and/or Vomiting        FAMILY HISTORY:      CBC Full  -  ( 14 Jan 2025 05:30 )  WBC Count : 20.05 K/uL  RBC Count : 4.01 M/uL  Hemoglobin : 10.8 g/dL  Hematocrit : 34.7 %  Platelet Count - Automated : 175 K/uL  Mean Cell Volume : 86.5 fl  Mean Cell Hemoglobin : 26.9 pg  Mean Cell Hemoglobin Concentration : 31.1 g/dL  Auto Neutrophil # : 17.04 K/uL  Auto Lymphocyte # : 1.16 K/uL  Auto Monocyte # : 1.61 K/uL  Auto Eosinophil # : 0.01 K/uL  Auto Basophil # : 0.09 K/uL  Auto Neutrophil % : 85.1 %  Auto Lymphocyte % : 5.8 %  Auto Monocyte % : 8.0 %  Auto Eosinophil % : 0.0 %  Auto Basophil % : 0.4 %      01-14    140  |  103  |  26[H]  ----------------------------<  142[H]  3.1[L]   |  22  |  1.95[H]    Ca    8.8      14 Jan 2025 01:47  Phos  2.9     01-14  Mg     3.1     01-14        Urinalysis Basic - ( 14 Jan 2025 01:47 )    Color: x / Appearance: x / SG: x / pH: x  Gluc: 142 mg/dL / Ketone: x  / Bili: x / Urobili: x   Blood: x / Protein: x / Nitrite: x   Leuk Esterase: x / RBC: x / WBC x   Sq Epi: x / Non Sq Epi: x / Bacteria: x        Radiology :     < from: CT Head No Cont (01.13.25 @ 16:47) >  ACC: [id number] EXAM:  CT BRAIN   ORDERED BY: NATIVIDAD MONTANA     PROCEDURE DATE:  01/13/2025          INTERPRETATION:  EXAM: CT HEAD WITHOUT INTRAVENOUS CONTRAST    HISTORY: Altered mental status    TECHNIQUE: Volumetric axial images were obtained from the skull base to   the vertex. Sagittal and coronal reformatted images were obtained . The   images were reviewed in brain and bone windows.    COMPARISON: CT from 11/11/2024.    FINDINGS:    Moderate burden of chronic small vessel ischemic changes in the white   matter.    Old lacunar infarct in left lentiform nucleus.    Old infarct in the right occipital lobe again seen.    No acute intracranial hemorrhage.    The gray-white matter discrimination is well maintained.    No dense vessel in the anterior or posterior circulation.    No hydrocephalus.    The extra-axial spaces and basal cisterns are within normal limits. No   midline shift or mass effect present.      The craniocervical junction is within normal limits.    The sella is not expanded.    No depressed calvarial fracture.    Marrow spaces of the skull base and visualized spine appear normal.    The visualized paranasal sinuses and sinonasal cavity are well aerated.    The visualized mastoid air cells are well aerated.    The visualized orbits are within normal limits.      IMPRESSION:    No evidence of acute intracranial hemorrhage or midline shift.    No acute abnormality. Old right occipital and left lentiform nucleus   infarcts are stable from the prior study.    MRI is offered for further evaluation.    < from: Xray Chest 1 View- PORTABLE-Urgent (Xray Chest 1 View- PORTABLE-Urgent .) (01.13.25 @ 17:29) >  ACC: [id number] EXAM:  XR CHEST PORTABLE URGENT 1V   ORDERED BY: NATIVIDAD MONTANA     PROCEDURE DATE:  01/13/2025          INTERPRETATION:  AP chest.    Clinical indications: Fall. Trauma protocol.    IMPRESSION: There is a right-sided Mediport with itstip in the SVC. The   heart is normal in size. The lungs are clear.      < from: Xray Hip w/ Pelvis 1 View, Right (01.13.25 @ 18:35) >  ACC: [id number] EXAM:  XR HIP WITH PELV 1V RT   ORDERED BY: NATIVIDAD MONTANA     PROCEDURE DATE:  01/13/2025          INTERPRETATION:  AP pelvis and 2 views of the right hip.    Clinical indications: Right hip pain.    IMPRESSION: The patient's right hip arthroplasty has been relocated.   There is no fracture identified.       Review of Systems : per HPI         Vital Signs Last 24 Hrs  T(C): 36.9 (14 Jan 2025 05:06), Max: 37.9 (13 Jan 2025 16:19)  T(F): 98.4 (14 Jan 2025 05:06), Max: 100.2 (13 Jan 2025 16:19)  HR: 88 (14 Jan 2025 06:45) (76 - 98)  BP: 165/71 (14 Jan 2025 06:45) (165/71 - 220/84)  BP(mean): 102 (14 Jan 2025 06:45) (102 - 120)  RR: 18 (14 Jan 2025 03:32) (18 - 18)  SpO2: 96% (14 Jan 2025 03:32) (91% - 97%)    Parameters below as of 14 Jan 2025 03:32  Patient On (Oxygen Delivery Method): room air            Physical Exam:   74 y o woman lying comfortably in semi Zee's position , awake , alert , no acute complaints , feels tired     Head: normocephalic , atraumatic    Eyes: PERRLA , EOMI , no nystagmus , sclera anicteric    ENT / FACE: neg nasal discharge , uvula midline , no oropharyngeal erythema / exudate    Neck: supple , negative JVD , negative carotid bruits , no thyromegaly    Chest:  R mediport , CTA bilaterally     Cardiovascular: regular rate and rhythm , neg murmurs / rubs / gallops    Abdomen: soft , non distended , no tenderness to palpation in all 4 quadrants ,  normal bowel sounds     Extremities: cool to touch richie LE's ,  , neg cyanosis /clubbing / edema      Neurologic Exam:     Alert and oriented to person , place , date/year , speech fluent w/o dysarthria     Cranial Nerves:           II:                         pupils equal , round and reactive to light , visual fields intact         III/ IV/VI:             extraocular movements intact , neg nystagmus , neg ptosis        V:                        facial sensation intact , V1-3 normal        VII:                      face symmetric , no droop , normal eye closure and smile        VIII:                     hearing intact to finger rub bilaterally        IX and X:             no hoarseness , gag intact , palate/ uvula rise symmetrically        XI:                       SCM / trapezius strength intact bilateral        XII:                      no tongue deviation    Motor Exam:        > 3+/5 x 4 extremities , without drift     Sensation:         intact to light touch x 4 extremities                            no neglect or extinction on double simultaneous testing    DTR:           biceps/brachioradialis: equal                            patella/ankle: equal          neg Babinski     Coordination:            Finger to Nose:  neg dysmetria bilaterally      Gait:  not tested         PM&R Impression: admitted for AMS/ found down on the floor at home , , found to be hypertensive to 190s-200s in ED    - no acute pathology on CT brain imaging     - deconditioned       Recommendations / Plan:       1) Physical / Occupational therapy focusing on therapeutic exercises , equipment evaluation , bed mobility/transfer out of bed evaluation , progressive ambulation with assistive devices prn .    2) Current disposition plan recommendation:    pending functional progress , lives alone has HHA 5 hrs/day     
Orthopaedic Surgery Consult Note    For Surgeon: Dr. Trevino    HPI: 74F with PMHx Lung CA with Brain mets, HTN, DM, CKD3, presents after unwitnessed fall at home. Per ED team patient was AAOx0 (baseline AAOx3) at presentation, was combative, and was given a dose of ativan. Pt was seen by orthopedic team in ED, resting supine in bed, nonresponsive to verbal communication moving all extremities spontaneously. Per daughter, patient underwent a ROSITA at [facility] in the spring of 2022, she does not recall the surgeon. Daughter reports the patient had 2 prior dislocations within 2 weeks of surgery, but has not had a dislocation since. Rest of history limited by patient condition.    Vital Signs Last 24 Hrs  T(C): 36.7 (13 Jan 2025 18:18), Max: 37.9 (13 Jan 2025 16:19)  T(F): 98 (13 Jan 2025 18:18), Max: 100.2 (13 Jan 2025 16:19)  HR: 78 (13 Jan 2025 18:12) (78 - 98)  BP: 194/84 (13 Jan 2025 18:12) (194/84 - 206/98)  BP(mean): --  RR: 18 (13 Jan 2025 18:12) (18 - 18)  SpO2: 96% (13 Jan 2025 18:12) (91% - 96%)    Parameters below as of 13 Jan 2025 18:12  Patient On (Oxygen Delivery Method): room air    Physical Exam:  General: AAOx0    Lower Extremity: RLE shortened and externally rotated, skin intact  Motor: moving all extremities spontaneously  Sensation: unable to assess due to patient condition  DP 2+ bilaterally    Imaging:   XR showing posterior hip dislocation    A/P: 74yFemale w/ right prosthetic hip dislocation  - Closed reduction performed in ED, confirmed by post-reduction XRs   - KI and Abduction pillow  - No surgical intervention indicated  - Posterior hip precautions  - Pain control  - Recommend PT eval once mental status improves  - Possible rehab placement, pending PT eval  - Possible abduction orthosis fitting  - Discussed with Dr. Trevino      Ortho Pager 
HPI:  74F with PMH of lung cancer (w/ mets to brain), CKD, HLD, DM2, RA (follows with Dr. Johnston), RLE DVT (off Eliquis d/t frequent falls), prior hospitalization for confusion, BIBEMS for AMS and unwitnessed fall, found to be hypertensive to 190s-200s in ED. Daughter (Chloe) relayed history via phone given patients AMS. Per daughter:   This morning patient's HHA (rotating HHAs ~5hrs/day at least 3days/week) saw the pt who was reportedly acting normal around 9AM, then went to store to get a few things for patient. When HHA came back about 1hr later, began knocking on door but patient did not answer. HHA called pt daughter, who called her mother, but patient would not answer phone. Per daughter, HHA heard patient yell from bedroom that she won't come to door, that HHA should go home; saying "I'm trying to go to sleep". HHA then called 911/Firefighters who broke door down and found patient lying on floor of her bedroom. Patient apparently did not realize where she was, appeared to think she was in bed. When asked by police, patient only reported "little bit of pain", thus BIBEMS to hospital.    Daughter further reports that at baseline patient is largely functional; she walks with rollator, but sometimes walks without any assisted device in her apartment. Patient cooks for herself mostly, but daughter also brings her food. Overall reports patient has good degree of functionality but has had progressive memory deficits for past several months; for example patient washes her clothes in laundry herself; but sometimes forgets to take them out and hang them up. Has had discrete episodes of confusion in the past per daughter, which she states is unclear if 2/2 dementia vs brain mets.     In ED, patient was noted to be very confused and combative; was given lorazepam prior to arrival to tele floor.     ED COURSE  Initial vital signs: T: 98.8 F, HR: 92, BP: 205/95, R: 18, SpO2: 91% on RA  Labs: significant for WBC 23.33, Neutrophil 81.6%, PT 13.9, INR 1.19, aPTT 27.0, K 3.4, BUN 27, Cr 2.07, Gluc 220, eGFR 25, U/A: turbid, spec grav 1.015, Protein 300, Nitrite negative, Large LE, pH 6.0, , RBC 5, Many bacteria, Cast 1, Present WBC clumps   Imaging:  CTH: No evidence of acute intracranial hemorrhage or midline shift. No acute abnormality. 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  CXR: There is a right-sided Mediport with its tip in the SVC. The heart is normal in size. The lungs are clear.  EKG: NSR, L anterior fascicular block, LVH?, HR 92,   XR Pelvis: Dislocation of the patient's right total hip arthroplasty with osseous. The change at the lateral aspect of the iliac bone and acetabulum likely chronic in nature. The left hip is normal in appearance. Calcified uterine fibroid. Degenerative changes of the lumbar spine. Vascular calcification. No fractures.  Medications: Ofirmev 1g x1, CTX 1g x1, 2U Lispro, Ativan 1mg x1, Lopressor 5mg IVP x1, 1L NS x1  Consults: Ortho --> reduced R hip dislocation   (13 Jan 2025 19:16)    PERTINENT PM/SXH:   HTN (hypertension)    HLD (hyperlipidemia)    DM (diabetes mellitus), type 2    Rheumatoid arthritis    Stage 4 chronic kidney disease    Degenerative joint disease (DJD) of lumbar spine    Osteopenia    Lung cancer metastatic to brain    Stage 3 chronic kidney disease    S/P total right hip arthroplasty      FAMILY HISTORY:  No history of cancer in first degree relatives according to chart    ITEMS NOT CHECKED ARE NOT PRESENT  SOCIAL HISTORY:   Significant other/partner:  []  Children:  [X]   Substance hx:  []   Tobacco hx:  []   Alcohol hx: []   Home Opioid hx:  [] I-Stop Reference No:  - no active Rx's / see chart note  Living Situation: []Home  []Long term care  []Rehab []Other  Restorationist/Spiritual practice: ; Role of organized Temple [ ] important [ ] some [ ] unable to assess  Coping: [] well [] with difficulty [] poor coping [] unable to assess  Support system: [] strong [] adequate [] inadequate    ADVANCE DIRECTIVES:    []MOLST  []Living Will  DECISION MAKER(s):  [] Health Care Proxy(s)  [X] Surrogate(s)  [] Guardian           Name(s)/Phone Number(s): Chloe Minor (daughter)    BASELINE (I)ADLs (prior to admission):  [place]: []Total  [X] Moderate []Dependent    ALLERGIES:  strawberry (Pruritus)  citrus (Urticaria)  Bactrim (Rash)    MEDICATIONS  (STANDING):  ampicillin  IVPB 500 milliGRAM(s) IV Intermittent every 6 hours  atorvastatin 40 milliGRAM(s) Oral at bedtime  dextrose 5%. 1000 milliLiter(s) (50 mL/Hr) IV Continuous <Continuous>  dextrose 5%. 1000 milliLiter(s) (100 mL/Hr) IV Continuous <Continuous>  dextrose 50% Injectable 25 Gram(s) IV Push once  dextrose 50% Injectable 12.5 Gram(s) IV Push once  dextrose 50% Injectable 25 Gram(s) IV Push once  glucagon  Injectable 1 milliGRAM(s) IntraMuscular once  heparin   Injectable 5000 Unit(s) SubCutaneous every 8 hours  insulin glargine Injectable (LANTUS) 16 Unit(s) SubCutaneous at bedtime  insulin lispro (ADMELOG) corrective regimen sliding scale   SubCutaneous Before meals and at bedtime  insulin lispro Injectable (ADMELOG) 7 Unit(s) SubCutaneous three times a day before meals  NIFEdipine XL 60 milliGRAM(s) Oral every 24 hours  senna 2 Tablet(s) Oral at bedtime    MEDICATIONS  (PRN):  acetaminophen     Tablet .. 650 milliGRAM(s) Oral every 6 hours PRN Temp greater or equal to 38C (100.4F), Mild Pain (1 - 3)  dextrose Oral Gel 15 Gram(s) Oral once PRN Blood Glucose LESS THAN 70 milliGRAM(s)/deciliter  ondansetron Injectable 4 milliGRAM(s) IV Push every 8 hours PRN Nausea and/or Vomiting  polyethylene glycol 3350 17 Gram(s) Oral daily PRN Constipation    Analgesic Use (Scheduled and PRNs) for past 24 hours:  acetaminophen     Tablet ..   650 milliGRAM(s) Oral (01-16-25 @ 10:33)   650 milliGRAM(s) Oral (01-15-25 @ 17:12)      PRESENT SYMPTOMS/REVIEW OF SYSTEMS: []Unable to obtain due to poor mentation/encephalopathy  Source if other than patient:  []Family   []Team     Pain: [] yes [X] no  QOL Impact -   Location -                    Aggravating Factors -  Quality -  Radiation -  Timing -  Severity (0-10 scale) -   Minimal Acceptable Level (0-10 scale) -    CPOT Score:  (Pain Assessment Scale for Critically Ill)    PAIN AD Score:  (Nonverbal Pain Assessment Scale)    Dyspnea:                           []Mild  []Moderate []Severe  Anxiety:                             []Mild []Moderate []Severe  Fatigue:                             []Mild []Moderate []Severe  Nausea:                             []Mild []Moderate []Severe  Loss of Appetite:              []Mild []Moderate []Severe  Constipation:                    []Mild []Moderate []Severe    Other Symptoms:  [x]All Other Review Of Systems Negative     Palliative Performance Status Version 2:  60%  (Functional Assessment Tool)    PHYSICAL EXAM:  GENERAL:  [X]Alert  [X]Oriented x3   []Lethargic  []Cachexia  []Unarousable  []Verbal  []Non-Verbal  Behavioral:   []Anxiety  []Delirium []Agitation [X]Cooperative  HEENT:  [X]Normal   []Dry mouth   []ET Tube/Trach  []Oral lesions  PULMONARY:   [X]Clear []Tachypnea  []Audible excessive secretions  []Normal Work of Breathing []Labored Breathing  []Rhonchi []Crackles []Wheezing  CARDIOVASCULAR:    [X]Regular Rate & Rhythm []Irregular []Tachy  []Festus  GASTROINTESTINAL:  [X]Soft  []Distended   []+BS  [X]Non tender []Tender  []PEG []OGT/ NGT  Last BM:  GENITOURINARY:  [X]Normal [] Incontinent   []Oliguria/Anuria   []Salazar  MUSCULOSKELETAL:   []Normal   [X]Weakness  []Bed/Wheelchair bound []Edema  NEUROLOGIC:   [X]No focal deficits  []Cognitive impairment  []Dysphagia []Dysarthria []Paresis []Encephalopathic  SKIN:   [X]Normal   []Pressure ulcer(s)  []Rash    CRITICAL CARE:  []Shock Present  []Septic []Cardiogenic []Neurologic []Hypovolemic  []Vasopressors []Inotropes   []Respiratory failure present []Mechanical Ventilation []Non-invasive ventilatory support []High-Flow  []Acute  []Chronic []Hypoxic  []Hypercarbic  []Other organ failure    Vital Signs Last 24 Hrs  T(C): 37.4 (16 Jan 2025 06:01), Max: 37.4 (15 Jaime 2025 21:02)  T(F): 99.3 (16 Jan 2025 06:01), Max: 99.3 (15 Jaime 2025 21:02)  HR: 92 (16 Jan 2025 06:01) (84 - 92)  BP: 131/62 (16 Jan 2025 06:01) (110/64 - 131/62)  BP(mean): --  RR: 18 (16 Jan 2025 06:01) (18 - 18)  SpO2: 95% (16 Jan 2025 06:01) (94% - 95%)    Parameters below as of 15 Jaime 2025 21:02  Patient On (Oxygen Delivery Method): room air        LABS:                        10.2   11.15 )-----------( 176      ( 16 Jan 2025 05:30 )             33.1   01-16    139  |  107  |  44[H]  ----------------------------<  175[H]  4.3   |  20[L]  |  2.27[H]    Ca    8.4      16 Jan 2025 05:30  Phos  2.5     01-16  Mg     1.9     01-16      RADIOLOGY & ADDITIONAL STUDIES:  < from: CT Head No Cont (01.13.25 @ 16:47) >    IMPRESSION:    No evidence of acute intracranial hemorrhage or midline shift.    No acute abnormality. Old right occipital and left lentiform nucleus   infarcts are stable from the prior study.    MRI is offered for further evaluation.    < end of copied text >      REFERRALS:  [x]Social Work  [X]Case management [X]PT/OT []Chaplaincy  []Hospice  []Patient/Family Support []Massage Therapy []Music Therapy    DISCUSSION OF CASE:  Primary team - discussed plan of care

## 2025-01-16 NOTE — PROGRESS NOTE ADULT - REASON FOR ADMISSION
AMS and fall

## 2025-01-17 ENCOUNTER — TRANSCRIPTION ENCOUNTER (OUTPATIENT)
Age: 75
End: 2025-01-17

## 2025-01-17 VITALS
SYSTOLIC BLOOD PRESSURE: 144 MMHG | DIASTOLIC BLOOD PRESSURE: 74 MMHG | TEMPERATURE: 98 F | OXYGEN SATURATION: 94 % | RESPIRATION RATE: 20 BRPM | HEART RATE: 95 BPM

## 2025-01-17 LAB
ANION GAP SERPL CALC-SCNC: 11 MMOL/L — SIGNIFICANT CHANGE UP (ref 5–17)
BASOPHILS # BLD AUTO: 0.05 K/UL — SIGNIFICANT CHANGE UP (ref 0–0.2)
BASOPHILS NFR BLD AUTO: 0.5 % — SIGNIFICANT CHANGE UP (ref 0–2)
BUN SERPL-MCNC: 45 MG/DL — HIGH (ref 7–23)
CALCIUM SERPL-MCNC: 8.2 MG/DL — LOW (ref 8.4–10.5)
CHLORIDE SERPL-SCNC: 108 MMOL/L — SIGNIFICANT CHANGE UP (ref 96–108)
CO2 SERPL-SCNC: 21 MMOL/L — LOW (ref 22–31)
CREAT SERPL-MCNC: 2.1 MG/DL — HIGH (ref 0.5–1.3)
EGFR: 24 ML/MIN/1.73M2 — LOW
EOSINOPHIL # BLD AUTO: 0.37 K/UL — SIGNIFICANT CHANGE UP (ref 0–0.5)
EOSINOPHIL NFR BLD AUTO: 3.7 % — SIGNIFICANT CHANGE UP (ref 0–6)
GLUCOSE SERPL-MCNC: 151 MG/DL — HIGH (ref 70–99)
HCT VFR BLD CALC: 31 % — LOW (ref 34.5–45)
HGB BLD-MCNC: 9.5 G/DL — LOW (ref 11.5–15.5)
IMM GRANULOCYTES NFR BLD AUTO: 1.1 % — HIGH (ref 0–0.9)
LYMPHOCYTES # BLD AUTO: 1.7 K/UL — SIGNIFICANT CHANGE UP (ref 1–3.3)
LYMPHOCYTES # BLD AUTO: 16.8 % — SIGNIFICANT CHANGE UP (ref 13–44)
MAGNESIUM SERPL-MCNC: 1.7 MG/DL — SIGNIFICANT CHANGE UP (ref 1.6–2.6)
MCHC RBC-ENTMCNC: 27.1 PG — SIGNIFICANT CHANGE UP (ref 27–34)
MCHC RBC-ENTMCNC: 30.6 G/DL — LOW (ref 32–36)
MCV RBC AUTO: 88.3 FL — SIGNIFICANT CHANGE UP (ref 80–100)
MONOCYTES # BLD AUTO: 0.89 K/UL — SIGNIFICANT CHANGE UP (ref 0–0.9)
MONOCYTES NFR BLD AUTO: 8.8 % — SIGNIFICANT CHANGE UP (ref 2–14)
NEUTROPHILS # BLD AUTO: 6.99 K/UL — SIGNIFICANT CHANGE UP (ref 1.8–7.4)
NEUTROPHILS NFR BLD AUTO: 69.1 % — SIGNIFICANT CHANGE UP (ref 43–77)
NRBC # BLD: 0 /100 WBCS — SIGNIFICANT CHANGE UP (ref 0–0)
PHOSPHATE SERPL-MCNC: 3.1 MG/DL — SIGNIFICANT CHANGE UP (ref 2.5–4.5)
PLATELET # BLD AUTO: 179 K/UL — SIGNIFICANT CHANGE UP (ref 150–400)
POTASSIUM SERPL-MCNC: 4.4 MMOL/L — SIGNIFICANT CHANGE UP (ref 3.5–5.3)
POTASSIUM SERPL-SCNC: 4.4 MMOL/L — SIGNIFICANT CHANGE UP (ref 3.5–5.3)
RBC # BLD: 3.51 M/UL — LOW (ref 3.8–5.2)
RBC # FLD: 14.5 % — SIGNIFICANT CHANGE UP (ref 10.3–14.5)
SODIUM SERPL-SCNC: 140 MMOL/L — SIGNIFICANT CHANGE UP (ref 135–145)
WBC # BLD: 10.11 K/UL — SIGNIFICANT CHANGE UP (ref 3.8–10.5)
WBC # FLD AUTO: 10.11 K/UL — SIGNIFICANT CHANGE UP (ref 3.8–10.5)

## 2025-01-17 PROCEDURE — 82330 ASSAY OF CALCIUM: CPT

## 2025-01-17 PROCEDURE — 86901 BLOOD TYPING SEROLOGIC RH(D): CPT

## 2025-01-17 PROCEDURE — 97530 THERAPEUTIC ACTIVITIES: CPT

## 2025-01-17 PROCEDURE — 81001 URINALYSIS AUTO W/SCOPE: CPT

## 2025-01-17 PROCEDURE — 82550 ASSAY OF CK (CPK): CPT

## 2025-01-17 PROCEDURE — 87637 SARSCOV2&INF A&B&RSV AMP PRB: CPT

## 2025-01-17 PROCEDURE — 73502 X-RAY EXAM HIP UNI 2-3 VIEWS: CPT

## 2025-01-17 PROCEDURE — 84295 ASSAY OF SERUM SODIUM: CPT

## 2025-01-17 PROCEDURE — 99285 EMERGENCY DEPT VISIT HI MDM: CPT

## 2025-01-17 PROCEDURE — 84132 ASSAY OF SERUM POTASSIUM: CPT

## 2025-01-17 PROCEDURE — 73501 X-RAY EXAM HIP UNI 1 VIEW: CPT

## 2025-01-17 PROCEDURE — 87086 URINE CULTURE/COLONY COUNT: CPT

## 2025-01-17 PROCEDURE — 74019 RADEX ABDOMEN 2 VIEWS: CPT

## 2025-01-17 PROCEDURE — 85025 COMPLETE CBC W/AUTO DIFF WBC: CPT

## 2025-01-17 PROCEDURE — 85610 PROTHROMBIN TIME: CPT

## 2025-01-17 PROCEDURE — 36415 COLL VENOUS BLD VENIPUNCTURE: CPT

## 2025-01-17 PROCEDURE — 93970 EXTREMITY STUDY: CPT

## 2025-01-17 PROCEDURE — 80048 BASIC METABOLIC PNL TOTAL CA: CPT

## 2025-01-17 PROCEDURE — 83036 HEMOGLOBIN GLYCOSYLATED A1C: CPT

## 2025-01-17 PROCEDURE — 97161 PT EVAL LOW COMPLEX 20 MIN: CPT

## 2025-01-17 PROCEDURE — 87077 CULTURE AEROBIC IDENTIFY: CPT

## 2025-01-17 PROCEDURE — 87186 SC STD MICRODIL/AGAR DIL: CPT

## 2025-01-17 PROCEDURE — 99232 SBSQ HOSP IP/OBS MODERATE 35: CPT | Mod: GC

## 2025-01-17 PROCEDURE — 82962 GLUCOSE BLOOD TEST: CPT

## 2025-01-17 PROCEDURE — 83735 ASSAY OF MAGNESIUM: CPT

## 2025-01-17 PROCEDURE — 82803 BLOOD GASES ANY COMBINATION: CPT

## 2025-01-17 PROCEDURE — 86900 BLOOD TYPING SEROLOGIC ABO: CPT

## 2025-01-17 PROCEDURE — 93005 ELECTROCARDIOGRAM TRACING: CPT

## 2025-01-17 PROCEDURE — 85730 THROMBOPLASTIN TIME PARTIAL: CPT

## 2025-01-17 PROCEDURE — 84145 PROCALCITONIN (PCT): CPT

## 2025-01-17 PROCEDURE — 71045 X-RAY EXAM CHEST 1 VIEW: CPT

## 2025-01-17 PROCEDURE — 84100 ASSAY OF PHOSPHORUS: CPT

## 2025-01-17 PROCEDURE — 86850 RBC ANTIBODY SCREEN: CPT

## 2025-01-17 PROCEDURE — 70450 CT HEAD/BRAIN W/O DYE: CPT | Mod: MC

## 2025-01-17 PROCEDURE — 93306 TTE W/DOPPLER COMPLETE: CPT

## 2025-01-17 PROCEDURE — 84484 ASSAY OF TROPONIN QUANT: CPT

## 2025-01-17 PROCEDURE — 83605 ASSAY OF LACTIC ACID: CPT

## 2025-01-17 PROCEDURE — 97165 OT EVAL LOW COMPLEX 30 MIN: CPT

## 2025-01-17 PROCEDURE — 87040 BLOOD CULTURE FOR BACTERIA: CPT

## 2025-01-17 RX ORDER — POLYETHYLENE GLYCOL 3350 17 G/DOSE
17 POWDER (GRAM) ORAL
Qty: 0 | Refills: 0 | DISCHARGE
Start: 2025-01-17

## 2025-01-17 RX ORDER — SODIUM CHLORIDE 9 MG/ML
1000 INJECTION, SOLUTION INTRAVENOUS ONCE
Refills: 0 | Status: COMPLETED | OUTPATIENT
Start: 2025-01-17 | End: 2025-01-17

## 2025-01-17 RX ORDER — SENNOSIDES 8.6 MG/1
2 TABLET, FILM COATED ORAL
Qty: 0 | Refills: 0 | DISCHARGE
Start: 2025-01-17

## 2025-01-17 RX ORDER — NIFEDIPINE 60 MG/1
1 TABLET, EXTENDED RELEASE ORAL
Qty: 0 | Refills: 0 | DISCHARGE
Start: 2025-01-17

## 2025-01-17 RX ORDER — BISACODYL 5 MG
10 TABLET, DELAYED RELEASE (ENTERIC COATED) ORAL ONCE
Refills: 0 | Status: COMPLETED | OUTPATIENT
Start: 2025-01-17 | End: 2025-01-17

## 2025-01-17 RX ADMIN — HEPARIN SODIUM 5000 UNIT(S): 1000 INJECTION, SOLUTION INTRAVENOUS; SUBCUTANEOUS at 13:29

## 2025-01-17 RX ADMIN — ACETAMINOPHEN 650 MILLIGRAM(S): 80 SOLUTION/ DROPS ORAL at 11:40

## 2025-01-17 RX ADMIN — Medication 104 MILLIGRAM(S): at 05:20

## 2025-01-17 RX ADMIN — Medication 7 UNIT(S): at 12:40

## 2025-01-17 RX ADMIN — Medication 10 MILLIGRAM(S): at 07:44

## 2025-01-17 RX ADMIN — ACETAMINOPHEN 650 MILLIGRAM(S): 80 SOLUTION/ DROPS ORAL at 10:41

## 2025-01-17 RX ADMIN — SODIUM CHLORIDE 1000 MILLILITER(S): 9 INJECTION, SOLUTION INTRAVENOUS at 10:27

## 2025-01-17 RX ADMIN — Medication 104 MILLIGRAM(S): at 12:39

## 2025-01-17 RX ADMIN — Medication 2: at 12:40

## 2025-01-17 RX ADMIN — HEPARIN SODIUM 5000 UNIT(S): 1000 INJECTION, SOLUTION INTRAVENOUS; SUBCUTANEOUS at 05:20

## 2025-01-17 RX ADMIN — Medication 2: at 08:53

## 2025-01-17 RX ADMIN — Medication 7 UNIT(S): at 08:54

## 2025-01-17 NOTE — DISCHARGE NOTE NURSING/CASE MANAGEMENT/SOCIAL WORK - NSDCPEFALRISK_GEN_ALL_CORE
For information on Fall & Injury Prevention, visit: [email or web address] OR  [email or web address] OR  https://www.cdc.gov/steadi/patient.html

## 2025-01-17 NOTE — DISCHARGE NOTE NURSING/CASE MANAGEMENT/SOCIAL WORK - FINANCIAL ASSISTANCE
[facility] provides services at a reduced cost to those who are determined to be eligible through [facility]’s financial assistance program. Information regarding [facility]’s financial assistance program can be found by going to [email or web address] or by calling [phone].

## 2025-01-17 NOTE — DISCHARGE NOTE NURSING/CASE MANAGEMENT/SOCIAL WORK - PATIENT PORTAL LINK FT
You can access the FollowMyHealth Patient Portal offered by [facility] by registering at the following website: [email or web address]. By joining [email or web address] FollowMyHealth portal, you will also be able to view your health information using other applications (apps) compatible with our system.

## 2025-01-18 LAB
CULTURE RESULTS: SIGNIFICANT CHANGE UP
CULTURE RESULTS: SIGNIFICANT CHANGE UP
SPECIMEN SOURCE: SIGNIFICANT CHANGE UP
SPECIMEN SOURCE: SIGNIFICANT CHANGE UP

## 2025-01-18 RX ORDER — AMOXICILLIN 500 MG
1 CAPSULE ORAL
Qty: 6 | Refills: 0
Start: 2025-01-18 | End: 2025-01-20

## 2025-01-20 NOTE — DIETITIAN INITIAL EVALUATION ADULT - HEIGHT FOR BMI (CENTIMETERS)
[facility]        NAME: Kyle Whitley is a 49 y.o. male  : 1975    MRN: [id number]  DATE: 2025  TIME: 6:49 PM    Assessment and Plan   No primary diagnosis found.  1. Cough with fever  XR chest pa and lateral    predniSONE 20 mg tablet    brompheniramine-pseudoephedrine-DM 30-2-10 MG/5ML syrup            Patient Instructions       Take medications as prescribed  Chest x-ray results pending  Follow up with PCP in 3-5 days.  Proceed to  ER if symptoms worsen.    If tests have been performed at [facility], our office will contact you with results if changes need to be made to the care plan discussed with you at the visit.  You can review your full results on [facility].    Chief Complaint     Chief Complaint   Patient presents with    Cold Like Symptoms     Patient started with a cough a few days ago and right ear pain that started yesterday          History of Present Illness       HPI  Presents to clinic with complaint of right ear pain which started yesterday.  Also coughing with some congestion for a few days.  States he had fever at home about 100 degrees.  Denies history of asthma or COPD.  At the clinic his oxygen is running between 91% and 94%.  Denies shortness of breath    Review of Systems   Review of Systems   Constitutional:  Positive for fever (aboiut 100 degrees at home).   HENT:  Positive for congestion, ear pain (right ear), rhinorrhea and sinus pressure. Negative for sore throat.    Respiratory:  Positive for cough. Negative for chest tightness and wheezing.    Cardiovascular:  Negative for chest pain and palpitations.   Neurological:  Negative for light-headedness and headaches.         Current Medications       Current Outpatient Medications:     brompheniramine-pseudoephedrine-DM 30-2-10 MG/5ML syrup, Take 10 mL by mouth 3 (three) times a day as needed for allergies, Disp: 150 mL, Rfl: 0    predniSONE 20 mg tablet, Take 1 tablet (20 mg total) by mouth 2 (two)  times a day with meals for 5 days, Disp: 10 tablet, Rfl: 0    Current Allergies     Allergies as of 01/20/2025    (No Known Allergies)            The following portions of the patient's history were reviewed and updated as appropriate: allergies, current medications, past family history, past medical history, past social history, past surgical history and problem list.     History reviewed. No pertinent past medical history.    History reviewed. No pertinent surgical history.    History reviewed. No pertinent family history.      Medications have been verified.        Objective   /82   Pulse 95   Temp (!) 100.9 °F (38.3 °C)   Resp 18   SpO2 94%   No LMP for male patient.       Physical Exam     Physical Exam  Constitutional:       Appearance: He is not ill-appearing.   HENT:      Head:      Comments: None tenderness to palpation of the sinuses     Right Ear: Tympanic membrane normal.      Left Ear: Tympanic membrane normal.      Nose: Rhinorrhea present.      Mouth/Throat:      Pharynx: No posterior oropharyngeal erythema.   Cardiovascular:      Rate and Rhythm: Regular rhythm.      Heart sounds: Normal heart sounds.   Pulmonary:      Effort: Pulmonary effort is normal.      Breath sounds: Wheezing present.      Comments: Congestion in the lower lobes, moderate severity                   160.02

## 2025-01-24 DIAGNOSIS — R13.10 DYSPHAGIA, UNSPECIFIED: ICD-10-CM

## 2025-01-24 DIAGNOSIS — E87.6 HYPOKALEMIA: ICD-10-CM
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
## 2025-01-24 DIAGNOSIS — E78.5 HYPERLIPIDEMIA, UNSPECIFIED: ICD-10-CM

## 2025-01-24 DIAGNOSIS — M51.369 OTHER INTERVERTEBRAL DISC DEGENERATION, LUMBAR REGION WITHOUT MENTION OF LUMBAR BACK PAIN OR LOWER EXTREMITY PAIN: ICD-10-CM

## 2025-01-24 DIAGNOSIS — N39.0 URINARY TRACT INFECTION, SITE NOT SPECIFIED: ICD-10-CM

## 2025-01-24 DIAGNOSIS — C78.7 SECONDARY MALIGNANT NEOPLASM OF LIVER AND INTRAHEPATIC BILE DUCT: ICD-10-CM

## 2025-01-24 DIAGNOSIS — B95.2 ENTEROCOCCUS AS THE CAUSE OF DISEASES CLASSIFIED ELSEWHERE: ICD-10-CM

## 2025-01-24 DIAGNOSIS — A41.9 SEPSIS, UNSPECIFIED ORGANISM: ICD-10-CM
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
## 2025-01-24 DIAGNOSIS — Z79.82 LONG TERM (CURRENT) USE OF ASPIRIN: ICD-10-CM

## 2025-01-24 DIAGNOSIS — Z86.718 PERSONAL HISTORY OF OTHER VENOUS THROMBOSIS AND EMBOLISM: ICD-10-CM

## 2025-01-24 DIAGNOSIS — N18.30 CHRONIC KIDNEY DISEASE, STAGE 3 UNSPECIFIED: ICD-10-CM

## 2025-01-24 DIAGNOSIS — C34.90 MALIGNANT NEOPLASM OF UNSPECIFIED PART OF UNSPECIFIED BRONCHUS OR LUNG: ICD-10-CM

## 2025-01-24 DIAGNOSIS — M85.9 DISORDER OF BONE DENSITY AND STRUCTURE, UNSPECIFIED: ICD-10-CM

## 2025-01-24 DIAGNOSIS — Z91.81 HISTORY OF FALLING: ICD-10-CM

## 2025-01-24 DIAGNOSIS — E11.22 TYPE 2 DIABETES MELLITUS WITH DIABETIC CHRONIC KIDNEY DISEASE: ICD-10-CM

## 2025-01-24 DIAGNOSIS — M06.9 RHEUMATOID ARTHRITIS, UNSPECIFIED: ICD-10-CM

## 2025-01-24 DIAGNOSIS — G93.41 METABOLIC ENCEPHALOPATHY: ICD-10-CM

## 2025-01-24 DIAGNOSIS — Z86.73 PERSONAL HISTORY OF TRANSIENT ISCHEMIC ATTACK (TIA), AND CEREBRAL INFARCTION WITHOUT RESIDUAL DEFICITS: ICD-10-CM

## 2025-01-24 DIAGNOSIS — N17.9 ACUTE KIDNEY FAILURE, UNSPECIFIED: ICD-10-CM

## 2025-01-24 DIAGNOSIS — I13.10 HYPERTENSIVE HEART AND CHRONIC KIDNEY DISEASE WITHOUT HEART FAILURE, WITH STAGE 1 THROUGH STAGE 4 CHRONIC KIDNEY DISEASE, OR UNSPECIFIED CHRONIC KIDNEY DISEASE: ICD-10-CM

## 2025-01-24 DIAGNOSIS — Z91.018 ALLERGY TO OTHER FOODS: ICD-10-CM

## 2025-01-24 DIAGNOSIS — R63.8 OTHER SYMPTOMS AND SIGNS CONCERNING FOOD AND FLUID INTAKE: ICD-10-CM

## 2025-01-24 DIAGNOSIS — C79.31 SECONDARY MALIGNANT NEOPLASM OF BRAIN: ICD-10-CM

## 2025-01-24 DIAGNOSIS — F03.911 UNSPECIFIED DEMENTIA, UNSPECIFIED SEVERITY, WITH AGITATION: ICD-10-CM

## 2025-01-24 DIAGNOSIS — Z51.5 ENCOUNTER FOR PALLIATIVE CARE: ICD-10-CM

## 2025-01-24 DIAGNOSIS — Z79.4 LONG TERM (CURRENT) USE OF INSULIN: ICD-10-CM

## 2025-01-24 DIAGNOSIS — R09.02 HYPOXEMIA: ICD-10-CM

## 2025-01-24 DIAGNOSIS — R41.82 ALTERED MENTAL STATUS, UNSPECIFIED: ICD-10-CM

## 2025-01-24 DIAGNOSIS — Z87.440 PERSONAL HISTORY OF URINARY (TRACT) INFECTIONS: ICD-10-CM

## 2025-01-24 DIAGNOSIS — Z88.1 ALLERGY STATUS TO OTHER ANTIBIOTIC AGENTS: ICD-10-CM

## 2025-01-24 DIAGNOSIS — E83.42 HYPOMAGNESEMIA: ICD-10-CM

## 2025-01-24 DIAGNOSIS — S73.004A UNSPECIFIED DISLOCATION OF RIGHT HIP, INITIAL ENCOUNTER: ICD-10-CM

## 2025-01-24 DIAGNOSIS — K59.00 CONSTIPATION, UNSPECIFIED: ICD-10-CM

## 2025-01-24 DIAGNOSIS — I44.4 LEFT ANTERIOR FASCICULAR BLOCK: ICD-10-CM

## 2025-01-24 DIAGNOSIS — Z96.641 PRESENCE OF RIGHT ARTIFICIAL HIP JOINT: ICD-10-CM

## 2025-01-29 ENCOUNTER — APPOINTMENT (OUTPATIENT)
Dept: NEPHROLOGY | Facility: CLINIC | Age: 75
End: 2025-01-29

## 2025-02-03 ENCOUNTER — TRANSCRIPTION ENCOUNTER (OUTPATIENT)
Age: 75
End: 2025-02-03

## 2025-02-11 ENCOUNTER — TRANSCRIPTION ENCOUNTER (OUTPATIENT)
Age: 75
End: 2025-02-11

## 2025-02-14 ENCOUNTER — APPOINTMENT (OUTPATIENT)
Dept: ENDOCRINOLOGY | Facility: CLINIC | Age: 75
End: 2025-02-14

## 2025-02-18 ENCOUNTER — APPOINTMENT (OUTPATIENT)
Dept: ORTHOPEDIC SURGERY | Facility: CLINIC | Age: 75
End: 2025-02-18
Payer: MEDICARE

## 2025-02-18 ENCOUNTER — NON-APPOINTMENT (OUTPATIENT)
Age: 75
End: 2025-02-18

## 2025-02-18 ENCOUNTER — OUTPATIENT (OUTPATIENT)
Dept: OUTPATIENT SERVICES | Facility: HOSPITAL | Age: 75
LOS: 1 days | End: 2025-02-18
Payer: MEDICARE

## 2025-02-18 ENCOUNTER — RESULT REVIEW (OUTPATIENT)
Age: 75
End: 2025-02-18

## 2025-02-18 ENCOUNTER — INPATIENT (INPATIENT)
Facility: HOSPITAL | Age: 75
LOS: 6 days | Discharge: NURSING, REHAB OR LONG-TERM CARE | End: 2025-02-25
Attending: ORTHOPAEDIC SURGERY | Admitting: INTERNAL MEDICINE
Payer: MEDICARE

## 2025-02-18 VITALS
WEIGHT: 151 LBS | HEIGHT: 63 IN | HEART RATE: 87 BPM | BODY MASS INDEX: 26.75 KG/M2 | SYSTOLIC BLOOD PRESSURE: 178 MMHG | OXYGEN SATURATION: 83 % | DIASTOLIC BLOOD PRESSURE: 77 MMHG

## 2025-02-18 VITALS
HEIGHT: 63 IN | WEIGHT: 151.02 LBS | DIASTOLIC BLOOD PRESSURE: 83 MMHG | TEMPERATURE: 98 F | RESPIRATION RATE: 18 BRPM | HEART RATE: 71 BPM | SYSTOLIC BLOOD PRESSURE: 185 MMHG | OXYGEN SATURATION: 98 %

## 2025-02-18 DIAGNOSIS — E11.9 TYPE 2 DIABETES MELLITUS WITHOUT COMPLICATIONS: ICD-10-CM

## 2025-02-18 DIAGNOSIS — T84.028A DISLOCATION OF OTHER INTERNAL JOINT PROSTHESIS, INITIAL ENCOUNTER: ICD-10-CM

## 2025-02-18 DIAGNOSIS — I10 ESSENTIAL (PRIMARY) HYPERTENSION: ICD-10-CM

## 2025-02-18 DIAGNOSIS — T84.020A DISLOCATION OF INTERNAL RIGHT HIP PROSTHESIS, INITIAL ENCOUNTER: ICD-10-CM

## 2025-02-18 DIAGNOSIS — Z96.649 DISLOCATION OF OTHER INTERNAL JOINT PROSTHESIS, INITIAL ENCOUNTER: ICD-10-CM

## 2025-02-18 DIAGNOSIS — C34.90 MALIGNANT NEOPLASM OF UNSPECIFIED PART OF UNSPECIFIED BRONCHUS OR LUNG: ICD-10-CM

## 2025-02-18 DIAGNOSIS — Z96.641 PRESENCE OF RIGHT ARTIFICIAL HIP JOINT: Chronic | ICD-10-CM

## 2025-02-18 DIAGNOSIS — Z86.718 PERSONAL HISTORY OF OTHER VENOUS THROMBOSIS AND EMBOLISM: ICD-10-CM

## 2025-02-18 DIAGNOSIS — I16.0 HYPERTENSIVE URGENCY: ICD-10-CM

## 2025-02-18 DIAGNOSIS — E78.5 HYPERLIPIDEMIA, UNSPECIFIED: ICD-10-CM

## 2025-02-18 DIAGNOSIS — Z29.9 ENCOUNTER FOR PROPHYLACTIC MEASURES, UNSPECIFIED: ICD-10-CM

## 2025-02-18 LAB
ADD ON TEST-SPECIMEN IN LAB: SIGNIFICANT CHANGE UP
ADD ON TEST-SPECIMEN IN LAB: SIGNIFICANT CHANGE UP
ANION GAP SERPL CALC-SCNC: 13 MMOL/L — SIGNIFICANT CHANGE UP (ref 5–17)
APTT BLD: 31.3 SEC — SIGNIFICANT CHANGE UP (ref 24.5–35.6)
BASOPHILS # BLD AUTO: 0.05 K/UL — SIGNIFICANT CHANGE UP (ref 0–0.2)
BASOPHILS NFR BLD AUTO: 0.8 % — SIGNIFICANT CHANGE UP (ref 0–2)
BLD GP AB SCN SERPL QL: NEGATIVE — SIGNIFICANT CHANGE UP
BUN SERPL-MCNC: 39 MG/DL — HIGH (ref 7–23)
CALCIUM SERPL-MCNC: 9.7 MG/DL — SIGNIFICANT CHANGE UP (ref 8.4–10.5)
CHLORIDE SERPL-SCNC: 100 MMOL/L — SIGNIFICANT CHANGE UP (ref 96–108)
CO2 SERPL-SCNC: 23 MMOL/L — SIGNIFICANT CHANGE UP (ref 22–31)
CREAT SERPL-MCNC: 1.66 MG/DL — HIGH (ref 0.5–1.3)
EGFR: 32 ML/MIN/1.73M2 — LOW
EOSINOPHIL # BLD AUTO: 0.25 K/UL — SIGNIFICANT CHANGE UP (ref 0–0.5)
EOSINOPHIL NFR BLD AUTO: 4.1 % — SIGNIFICANT CHANGE UP (ref 0–6)
GLUCOSE SERPL-MCNC: 194 MG/DL — HIGH (ref 70–99)
HCT VFR BLD CALC: 38.3 % — SIGNIFICANT CHANGE UP (ref 34.5–45)
HGB BLD-MCNC: 11.7 G/DL — SIGNIFICANT CHANGE UP (ref 11.5–15.5)
IMM GRANULOCYTES NFR BLD AUTO: 0.3 % — SIGNIFICANT CHANGE UP (ref 0–0.9)
INR BLD: 1.02 — SIGNIFICANT CHANGE UP (ref 0.85–1.16)
LYMPHOCYTES # BLD AUTO: 1.31 K/UL — SIGNIFICANT CHANGE UP (ref 1–3.3)
LYMPHOCYTES # BLD AUTO: 21.2 % — SIGNIFICANT CHANGE UP (ref 13–44)
MCHC RBC-ENTMCNC: 25.9 PG — LOW (ref 27–34)
MCHC RBC-ENTMCNC: 30.5 G/DL — LOW (ref 32–36)
MCV RBC AUTO: 84.7 FL — SIGNIFICANT CHANGE UP (ref 80–100)
MONOCYTES # BLD AUTO: 0.62 K/UL — SIGNIFICANT CHANGE UP (ref 0–0.9)
MONOCYTES NFR BLD AUTO: 10 % — SIGNIFICANT CHANGE UP (ref 2–14)
NEUTROPHILS # BLD AUTO: 3.92 K/UL — SIGNIFICANT CHANGE UP (ref 1.8–7.4)
NEUTROPHILS NFR BLD AUTO: 63.6 % — SIGNIFICANT CHANGE UP (ref 43–77)
NRBC BLD AUTO-RTO: 0 /100 WBCS — SIGNIFICANT CHANGE UP (ref 0–0)
PLATELET # BLD AUTO: 254 K/UL — SIGNIFICANT CHANGE UP (ref 150–400)
POTASSIUM SERPL-MCNC: 4.2 MMOL/L — SIGNIFICANT CHANGE UP (ref 3.5–5.3)
POTASSIUM SERPL-SCNC: 4.2 MMOL/L — SIGNIFICANT CHANGE UP (ref 3.5–5.3)
PROTHROM AB SERPL-ACNC: 11.9 SEC — SIGNIFICANT CHANGE UP (ref 9.9–13.4)
RBC # BLD: 4.52 M/UL — SIGNIFICANT CHANGE UP (ref 3.8–5.2)
RBC # FLD: 16.6 % — HIGH (ref 10.3–14.5)
RH IG SCN BLD-IMP: POSITIVE — SIGNIFICANT CHANGE UP
SODIUM SERPL-SCNC: 136 MMOL/L — SIGNIFICANT CHANGE UP (ref 135–145)
WBC # BLD: 6.17 K/UL — SIGNIFICANT CHANGE UP (ref 3.8–10.5)
WBC # FLD AUTO: 6.17 K/UL — SIGNIFICANT CHANGE UP (ref 3.8–10.5)

## 2025-02-18 PROCEDURE — 99221 1ST HOSP IP/OBS SF/LOW 40: CPT | Mod: GC

## 2025-02-18 PROCEDURE — 93010 ELECTROCARDIOGRAM REPORT: CPT

## 2025-02-18 PROCEDURE — 71045 X-RAY EXAM CHEST 1 VIEW: CPT | Mod: 26

## 2025-02-18 PROCEDURE — 93970 EXTREMITY STUDY: CPT | Mod: 26

## 2025-02-18 PROCEDURE — 72192 CT PELVIS W/O DYE: CPT | Mod: 26

## 2025-02-18 PROCEDURE — 99285 EMERGENCY DEPT VISIT HI MDM: CPT

## 2025-02-18 PROCEDURE — 76376 3D RENDER W/INTRP POSTPROCES: CPT | Mod: 26

## 2025-02-18 PROCEDURE — 73521 X-RAY EXAM HIPS BI 2 VIEWS: CPT | Mod: 26

## 2025-02-18 PROCEDURE — 99215 OFFICE O/P EST HI 40 MIN: CPT | Mod: 24

## 2025-02-18 RX ORDER — DEXTROSE 50 % IN WATER 50 %
25 SYRINGE (ML) INTRAVENOUS ONCE
Refills: 0 | Status: DISCONTINUED | OUTPATIENT
Start: 2025-02-18 | End: 2025-02-21

## 2025-02-18 RX ORDER — POLYETHYLENE GLYCOL 3350 17 G/17G
17 POWDER, FOR SOLUTION ORAL EVERY 24 HOURS
Refills: 0 | Status: DISCONTINUED | OUTPATIENT
Start: 2025-02-18 | End: 2025-02-20

## 2025-02-18 RX ORDER — NIFEDIPINE 30 MG
90 TABLET, EXTENDED RELEASE 24 HR ORAL ONCE
Refills: 0 | Status: COMPLETED | OUTPATIENT
Start: 2025-02-18 | End: 2025-02-18

## 2025-02-18 RX ORDER — INSULIN GLARGINE-YFGN 100 [IU]/ML
10 INJECTION, SOLUTION SUBCUTANEOUS AT BEDTIME
Refills: 0 | Status: DISCONTINUED | OUTPATIENT
Start: 2025-02-18 | End: 2025-02-18

## 2025-02-18 RX ORDER — DEXTROSE 50 % IN WATER 50 %
15 SYRINGE (ML) INTRAVENOUS ONCE
Refills: 0 | Status: DISCONTINUED | OUTPATIENT
Start: 2025-02-18 | End: 2025-02-21

## 2025-02-18 RX ORDER — SODIUM CHLORIDE 9 G/1000ML
1000 INJECTION, SOLUTION INTRAVENOUS
Refills: 0 | Status: DISCONTINUED | OUTPATIENT
Start: 2025-02-18 | End: 2025-02-25

## 2025-02-18 RX ORDER — DEXTROSE 50 % IN WATER 50 %
12.5 SYRINGE (ML) INTRAVENOUS ONCE
Refills: 0 | Status: DISCONTINUED | OUTPATIENT
Start: 2025-02-18 | End: 2025-02-21

## 2025-02-18 RX ORDER — ACETAMINOPHEN 500 MG/5ML
650 LIQUID (ML) ORAL EVERY 6 HOURS
Refills: 0 | Status: DISCONTINUED | OUTPATIENT
Start: 2025-02-18 | End: 2025-02-21

## 2025-02-18 RX ORDER — TRAMADOL HYDROCHLORIDE 50 MG/1
25 TABLET, FILM COATED ORAL EVERY 6 HOURS
Refills: 0 | Status: DISCONTINUED | OUTPATIENT
Start: 2025-02-18 | End: 2025-02-21

## 2025-02-18 RX ORDER — POVIDONE-IODINE 7.5 %
1 SOLUTION, NON-ORAL TOPICAL ONCE
Refills: 0 | Status: COMPLETED | OUTPATIENT
Start: 2025-02-20 | End: 2025-02-20

## 2025-02-18 RX ORDER — HEPARIN SODIUM 1000 [USP'U]/ML
5000 INJECTION INTRAVENOUS; SUBCUTANEOUS EVERY 8 HOURS
Refills: 0 | Status: DISCONTINUED | OUTPATIENT
Start: 2025-02-18 | End: 2025-02-19

## 2025-02-18 RX ORDER — LIDOCAINE HYDROCHLORIDE 20 MG/ML
2 JELLY TOPICAL EVERY 24 HOURS
Refills: 0 | Status: DISCONTINUED | OUTPATIENT
Start: 2025-02-19 | End: 2025-02-21

## 2025-02-18 RX ORDER — GLUCAGON 3 MG/1
1 POWDER NASAL ONCE
Refills: 0 | Status: DISCONTINUED | OUTPATIENT
Start: 2025-02-18 | End: 2025-02-25

## 2025-02-18 RX ORDER — NIFEDIPINE 30 MG
90 TABLET, EXTENDED RELEASE 24 HR ORAL EVERY 24 HOURS
Refills: 0 | Status: DISCONTINUED | OUTPATIENT
Start: 2025-02-19 | End: 2025-02-21

## 2025-02-18 RX ORDER — NIFEDIPINE 30 MG
1 TABLET, EXTENDED RELEASE 24 HR ORAL
Refills: 0 | DISCHARGE

## 2025-02-18 RX ORDER — SENNA 187 MG
2 TABLET ORAL AT BEDTIME
Refills: 0 | Status: DISCONTINUED | OUTPATIENT
Start: 2025-02-18 | End: 2025-02-20

## 2025-02-18 RX ORDER — SODIUM CHLORIDE 9 G/1000ML
1000 INJECTION, SOLUTION INTRAVENOUS
Refills: 0 | Status: DISCONTINUED | OUTPATIENT
Start: 2025-02-18 | End: 2025-02-21

## 2025-02-18 RX ORDER — INSULIN LISPRO 100 U/ML
INJECTION, SOLUTION INTRAVENOUS; SUBCUTANEOUS
Refills: 0 | Status: DISCONTINUED | OUTPATIENT
Start: 2025-02-18 | End: 2025-02-24

## 2025-02-18 RX ORDER — ATORVASTATIN CALCIUM 80 MG/1
40 TABLET, FILM COATED ORAL AT BEDTIME
Refills: 0 | Status: DISCONTINUED | OUTPATIENT
Start: 2025-02-18 | End: 2025-02-21

## 2025-02-18 RX ORDER — INSULIN GLARGINE-YFGN 100 [IU]/ML
14 INJECTION, SOLUTION SUBCUTANEOUS AT BEDTIME
Refills: 0 | Status: DISCONTINUED | OUTPATIENT
Start: 2025-02-18 | End: 2025-02-21

## 2025-02-18 RX ADMIN — ATORVASTATIN CALCIUM 40 MILLIGRAM(S): 80 TABLET, FILM COATED ORAL at 22:41

## 2025-02-18 RX ADMIN — Medication 90 MILLIGRAM(S): at 13:51

## 2025-02-18 RX ADMIN — INSULIN GLARGINE-YFGN 14 UNIT(S): 100 INJECTION, SOLUTION SUBCUTANEOUS at 22:42

## 2025-02-18 RX ADMIN — HEPARIN SODIUM 5000 UNIT(S): 1000 INJECTION INTRAVENOUS; SUBCUTANEOUS at 13:46

## 2025-02-18 RX ADMIN — HEPARIN SODIUM 5000 UNIT(S): 1000 INJECTION INTRAVENOUS; SUBCUTANEOUS at 22:52

## 2025-02-18 RX ADMIN — TRAMADOL HYDROCHLORIDE 25 MILLIGRAM(S): 50 TABLET, FILM COATED ORAL at 22:43

## 2025-02-18 RX ADMIN — TRAMADOL HYDROCHLORIDE 25 MILLIGRAM(S): 50 TABLET, FILM COATED ORAL at 23:51

## 2025-02-18 NOTE — ED PROVIDER NOTE - PHYSICAL EXAMINATION
Gen - NAD; well-appearing; A+Ox3   HEENT - NCAT, EOMI, moist mucous membranes  Neck - supple  Resp - CTAB, no increased WOB  CV -  RRR, no m/r/g  Abd - soft, NT, ND; no guarding or rebound  MSK - FROM of b/l UE and LE with exception to R hip limited at >45 degrees; no gross deformities, stable pelvis, +pain elicited with R hip PROM, NVI distally  Extrem - no LE edema  Neuro - no focal motor or sensation deficits  Skin - warm, well perfused; no rash or lesions

## 2025-02-18 NOTE — ED ADULT NURSE REASSESSMENT NOTE - NS ED NURSE REASSESS COMMENT FT1
Patient urinated and had small brown bowel movement, linens changed and patient cleaned. Skin intact. Patient urinated and had small brown bowel movement, linens changed and patient cleaned. Skin intact. Patient denies pain in hip or any complaints. Patient given dinner. BP elevated, Dr. Zayas informed.

## 2025-02-18 NOTE — H&P ADULT - NSHPPHYSICALEXAM_GEN_ALL_CORE
General: Alert and oriented x 3. No acute distress.   HEENT: Moist mucous membranes. Anicteric. No cervical lymphadenopathy.  Cardiovascular: Regular rate and rhythm. No murmur. Normal JVP.  Lungs: Clear to auscultation bilaterally. No accessory muscle use.  Abdomen: Soft, non-tender and non-distended. No palpable masses.  Extremities: No edema. Non-tender. Warm.  Skin: No rashes or lesions.   Neurologic: No apparent focal neurological deficits. CN II-XII grossly intact, but not individually tested.  Psychiatric: Cooperative. Appropriate mood and affect. General: Alert and oriented x 3. No acute distress.   HEENT: Moist mucous membranes.   Cardiovascular: Regular rate and rhythm. Systolic murmur.   Lungs: Clear to auscultation bilaterally. No accessory muscle use.  Abdomen: Soft, non-tender and non-distended. No palpable masses.  Extremities: 1+ edema b/l extending to knees  Skin: No rashes or lesions.   Neurologic: No apparent focal neurological deficits. CN II-XII grossly intact, but not individually tested.  Psychiatric: Cooperative. Appropriate mood and affect.

## 2025-02-18 NOTE — ED ADULT NURSE NOTE - CHIEF COMPLAINT QUOTE
[facility]. Pt. had fall 1x month ago. In wheelchair, pain is well controlled.

## 2025-02-18 NOTE — H&P ADULT - PROBLEM SELECTOR PLAN 5
baseline ~1.9-2  On admission 1.66  Plan:  -ctm bmp for DUNIA baseline ~1.9-2  On admission 1.66, Cr in January at [facility] 1.55  Plan:  -ctm bmp for DUNIA

## 2025-02-18 NOTE — ED ADULT NURSE REASSESSMENT NOTE - NS ED NURSE REASSESS COMMENT FT1
Patient remains in ED, sleeping on stretcher, responsive to verbal stimuli. Patient medicated for BP and Heparin as ordered. Patient refused Miralax and pain medication as present time.

## 2025-02-18 NOTE — ED ADULT NURSE NOTE - OBJECTIVE STATEMENT
74yr/female presents to ED from outpatient ortho office with right hip dislocation. Patient reports fall 4 weeks ago with dislocation. Reports unable to ambulate x4 weeks. Denies pain in right leg. Right leg appears shorter than left. 74yr/female presents to ED from outpatient ortho office with right hip dislocation. Patient reports fall 4 weeks ago with dislocation. Reports unable to ambulate x4 weeks. Denies pain in right leg or recent fall. Right leg appears shorter than left. 74yr/female presents to ED from outpatient ortho office with right hip dislocation. Patient reports fall 4 weeks ago with dislocation, was treated in hospital and discharged to rehab. Patient reports she has been unable to ambulate x4 weeks. Denies pain in right leg or any recent fall. Right leg appears shorter than left.

## 2025-02-18 NOTE — H&P ADULT - NSHPLABSRESULTS_GEN_ALL_CORE
11.7   6.17  )-----------( 254      ( 18 Feb 2025 11:03 )             38.3     02-18    136  |  100  |  39[H]  ----------------------------<  194[H]  4.2   |  23  |  1.66[H]    Ca    9.7      18 Feb 2025 11:03      PT/INR - ( 18 Feb 2025 11:03 )   PT: 11.9 sec;   INR: 1.02          PTT - ( 18 Feb 2025 11:03 )  PTT:31.3 sec  Urinalysis Basic - ( 18 Feb 2025 11:03 )    Color: x / Appearance: x / SG: x / pH: x  Gluc: 194 mg/dL / Ketone: x  / Bili: x / Urobili: x   Blood: x / Protein: x / Nitrite: x   Leuk Esterase: x / RBC: x / WBC x   Sq Epi: x / Non Sq Epi: x / Bacteria: x      CAPILLARY BLOOD GLUCOSE        RADIOLOGY & ADDITIONAL TESTS: Reviewed.

## 2025-02-18 NOTE — CONSULT NOTE ADULT - SUBJECTIVE AND OBJECTIVE BOX
Orthopaedic Surgery Consult Note    CC: Patient is a 74y old  Female who presents with a chief complaint of R hip dislocation w/ need for medical optimization (18 Feb 2025 11:40)    HPI:    Per medicine note:  "HPI:  74 year old female with history of  lung cancer (w/ mets to brain), CKD, HLD, DM2, RA (follows with Dr. Johnston), RLE DVT (off Eliquis d/t frequent falls), R hip replacement with multiple recent dislocations(1/13/25 with closed reduction done at that time), sent by Dr. Almanzar for admission for R hip surgery. States that she has been unable to walk since December due to continued R hip dislocation. She is currently residing at [facility] and notes she has not been walking since she got there due to the dislocation. Denies current pain at the joint site and denies recent falls since her hospitalization. Went to Dr. Almanzar's office today for follow up appointment and had XR imaging showing R hip dislocation--decision made to undergo R hip surgery. Denies recent illness, f/c, chest pain, sob, cough, n/v, dysuria.    ED COURSE  Vitals: 97.6F, HR 71, /83,  RR 18 98%  Significant labs: BUN 39, Cr 1.66, glucose 194  Ecg: bradycardia with PACs HR 58  Interventions: none (18 Feb 2025 11:40)"        ROS: Positive for above  All other systems negative, except for above.     Allergies  citrus (Urticaria)  strawberry (Pruritus)  Bactrim (Rash)    Intolerances    PAST MEDICAL & SURGICAL HISTORY:  HTN (hypertension)  HLD (hyperlipidemia)  DM (diabetes mellitus), type 2  Rheumatoid arthritis  Degenerative joint disease (DJD) of lumbar spine  Lung cancer metastatic to brain  Stage 3 chronic kidney disease  S/P total right hip arthroplasty      Social History:  lives at [facility]  Denies current tobacco, recreational drug or alcohol use (18 Feb 2025 11:40)    FAMILY HISTORY: non-contributory    Medications: see med rec    Vital Signs Last 24 Hrs  T(C): 36.7 (18 Feb 2025 13:32), Max: 36.7 (18 Feb 2025 13:32)  T(F): 98 (18 Feb 2025 13:32), Max: 98 (18 Feb 2025 13:32)  HR: 59 (18 Feb 2025 13:32) (59 - 71)  BP: 184/82 (18 Feb 2025 13:32) (184/82 - 185/83)  BP(mean): --  RR: 18 (18 Feb 2025 13:32) (18 - 18)  SpO2: 96% (18 Feb 2025 13:32) (96% - 98%)    Parameters below as of 18 Feb 2025 13:32  Patient On (Oxygen Delivery Method): room air        PE:  General: In no acute distress  Neuro: Alert, oriented x 3  Psych: Normal mood & affect   Skin: Warm, dry, extremities well perfused, no obvious rash  MSK:    -Inspection/palpation:    -Sensation:    -Motor:     -ROM:    -Pulses:                             11.7   6.17  )-----------( 254      ( 18 Feb 2025 11:03 )             38.3     02-18    136  |  100  |  39[H]  ----------------------------<  194[H]  4.2   |  23  |  1.66[H]    Ca    9.7      18 Feb 2025 11:03      PT/INR - ( 18 Feb 2025 11:03 )   PT: 11.9 sec;   INR: 1.02          PTT - ( 18 Feb 2025 11:03 )  PTT:31.3 sec    Imaging:   CT Pelvis 2/18/25  IMPRESSION:    CT of the pelvis demonstrates a dislocation of the right hip arthroplasty   with the femoral component dislocated superiorly in relation to the   acetabular component. There is no acute displaced fracture. Heterotopic   ossification along the superior margin of the femoral head component as   previously seen.    Lipoma with internal calcification at the anterior lateral abdominal wall.    --- End of Report ---        A/P: 74yFemale with right total hip arthroplasty dislocation, plan for OR 2/20/25 with Dr. Almanzar for revision right total hip arthroplasty    - Admit to medicine for pre op medical optimization prior to OR Thursday with Dr. Almanzar for   - Weight bearing status- Non-weightbearing RLE bedrest until OR  - DVT prophylaxis preop  - Please document medical clearance    Patient history, exam, imaging and plan discussed with Dr. Almanzar who is in agreement    Ortho Pager [phone]     Orthopaedic Surgery Consult Note    CC: Patient is a 74y old  Female who presents with a chief complaint of R hip dislocation w/ need for medical optimization (18 Feb 2025 11:40)    HPI: 74 year old female with history of  lung cancer (w/ mets to brain), CKD, HLD, DM2, RA (follows with Dr. Johnston), RLE DVT (off Eliquis d/t frequent falls), R hip replacement with multiple recent dislocations(1/13/25 with closed reduction done at that time), sent by Dr. Almanzar for admission for R hip surgery. States that she has been unable to walk since December due to continued R hip dislocation. She is currently residing at [facility], [place] and notes she has not been walking since she got there due to the dislocation. Denies current pain at the joint site and denies recent falls since her hospitalization. Went to Dr. Almanzar's office today for follow up appointment and had XR imaging showing R hip dislocation--decision made to undergo R hip surgery. Denies recent illness, f/c, chest pain, sob, cough, n/v, dysuria.    ED COURSE  Vitals: 97.6F, HR 71, /83,  RR 18 98%  Significant labs: BUN 39, Cr 1.66, glucose 194  Ecg: bradycardia with PACs HR 58  Interventions: none (18 Feb 2025 11:40)"        ROS: Positive for above  All other systems negative, except for above.     Allergies  citrus (Urticaria)  strawberry (Pruritus)  Bactrim (Rash)    Intolerances    PAST MEDICAL & SURGICAL HISTORY:  HTN (hypertension)  HLD (hyperlipidemia)  DM (diabetes mellitus), type 2  Rheumatoid arthritis  Degenerative joint disease (DJD) of lumbar spine  Lung cancer metastatic to brain  Stage 3 chronic kidney disease  S/P total right hip arthroplasty      Social History:  lives at [facility], [place]  Denies current tobacco, recreational drug or alcohol use (18 Feb 2025 11:40)    FAMILY HISTORY: non-contributory    Medications: see med rec    Vital Signs Last 24 Hrs  T(C): 36.7 (18 Feb 2025 13:32), Max: 36.7 (18 Feb 2025 13:32)  T(F): 98 (18 Feb 2025 13:32), Max: 98 (18 Feb 2025 13:32)  HR: 59 (18 Feb 2025 13:32) (59 - 71)  BP: 184/82 (18 Feb 2025 13:32) (184/82 - 185/83)  BP(mean): --  RR: 18 (18 Feb 2025 13:32) (18 - 18)  SpO2: 96% (18 Feb 2025 13:32) (96% - 98%)    Parameters below as of 18 Feb 2025 13:32  Patient On (Oxygen Delivery Method): room air        PE:  General: In no acute distress  Neuro: Alert, oriented x 3  Psych: Normal mood & affect   Skin: Warm, dry, extremities well perfused, no obvious rash  MSK: RLE short and internally rotated, patient unable to active range hip or knee. Has intact ankle plantar/dorsiflexion. Healed posterolateral hip incision.                            11.7   6.17  )-----------( 254      ( 18 Feb 2025 11:03 )             38.3     02-18    136  |  100  |  39[H]  ----------------------------<  194[H]  4.2   |  23  |  1.66[H]    Ca    9.7      18 Feb 2025 11:03      PT/INR - ( 18 Feb 2025 11:03 )   PT: 11.9 sec;   INR: 1.02          PTT - ( 18 Feb 2025 11:03 )  PTT:31.3 sec    Imaging:   CT Pelvis 2/18/25  IMPRESSION:    CT of the pelvis demonstrates a dislocation of the right hip arthroplasty   with the femoral component dislocated superiorly in relation to the   acetabular component. There is no acute displaced fracture. Heterotopic   ossification along the superior margin of the femoral head component as   previously seen.    Lipoma with internal calcification at the anterior lateral abdominal wall.    --- End of Report ---        A/P: 74yFemale with right total hip arthroplasty dislocation, plan for OR 2/20/25 with Dr. Almanzar for revision right total hip arthroplasty    - Admit to medicine for pre op medical optimization prior to OR Thursday with Dr. Almanzar for   - Weight bearing status- Non-weightbearing RLE bedrest until OR  - DVT prophylaxis preop  - Please document medical clearance    Patient history, exam, imaging and plan discussed with Dr. Almanzar who is in agreement    Ortho Pager [phone]     Orthopaedic Surgery Consult Note    CC: Patient is a 74y old  Female who presents with a chief complaint of Right  hip dislocation w/ need for medical optimization (18 Feb 2025 11:40)    HPI: 74 year old female with history of  lung cancer (w/ mets to brain), CKD, HLD, DM2, RA (follows with Dr. Johnston), RLE DVT (off Eliquis d/t frequent falls), Right hip replacement with multiple recent dislocations (1/13/25 with closed reduction done at that time), sent by Dr. Almanzar for admission for Right hip surgery. States that she has been unable to walk since December due to continued Right  hip dislocation. She is currently residing at  [facility] and notes she has not been walking since she got there. Pt. states she has been wearing a brace from Dr. Almanzar all the time and would be in a wheelchair via alcira lift at rehab vs in the bed. Pt. c/o right hip pain when she tries to stand up, but otherwise has no pain.  Denies current pain at the joint site and denies recent falls since her hospitalization. Pt. went to Dr. Almanzar's office today for follow up appointment and had XR imaging showing Right  hip dislocation--decision made to undergo Revision right thr surgery. Pt. unaware right was dislocated. Denies any accident/falls at rehab.  Denies recent illness, f/c, chest pain, sob, cough, n/v, dysuria.    ED COURSE  Vitals: 97.6F, HR 71, /83,  RR 18 98%  Significant labs: BUN 39, Cr 1.66, glucose 194  Ecg: bradycardia with PACs HR 58  Interventions: none (18 Feb 2025 11:40)"        ROS: Positive for above  All other systems negative, except for above.     Allergies  citrus (Urticaria)  strawberry (Pruritus)  Bactrim (Rash)    Intolerances    PAST MEDICAL & SURGICAL HISTORY:  HTN (hypertension)  HLD (hyperlipidemia)  DM (diabetes mellitus), type 2  Rheumatoid arthritis  Degenerative joint disease (DJD) of lumbar spine  Lung cancer metastatic to brain  Stage 3 chronic kidney disease  S/P total right hip arthroplasty      Social History:  lives at [facility]   Denies current tobacco, recreational drug or alcohol use (18 Feb 2025 11:40)    FAMILY HISTORY: non-contributory    Medications: see med rec    Vital Signs Last 24 Hrs  T(C): 36.7 (18 Feb 2025 13:32), Max: 36.7 (18 Feb 2025 13:32)  T(F): 98 (18 Feb 2025 13:32), Max: 98 (18 Feb 2025 13:32)  HR: 59 (18 Feb 2025 13:32) (59 - 71)  BP: 184/82 (18 Feb 2025 13:32) (184/82 - 185/83)  BP(mean): --  RR: 18 (18 Feb 2025 13:32) (18 - 18)  SpO2: 96% (18 Feb 2025 13:32) (96% - 98%)    Parameters below as of 18 Feb 2025 13:32  Patient On (Oxygen Delivery Method): room air      Constitutional: vitals reviewed per nursing documentation, NAD, lying comfortably in bed/stretcher  Eyes: PERRLA, no obvious deformity, ecchymoses, swelling of eyelids  Neck: trachea midline  Lungs: not using accessory muscles of respiration, normal respiratory effort  Neuro/Psych: A&Ox3, normal affect and mood  MSK:   Left LE - skin intact, sensation intact to light touch, 5/5 EHL/FHL/TA/GS, 2+ DP pulse, no open wounds/erythema/ecchymoses, no tenderness throughout lower extremity    Right LE - skin intact,  shortened and ER. Posterior hip incision intact and healed. Mild swelling present at right ankle region. No pitting edema. No ttp along right hip/LE regions. No calf tenderness. No open wounds/erythema/ecchymoses.  Sensation intact to light touch  Motor- EHL/FHL/TA/GS 5/5, Quad/hamstring unable to test 2/2 to dislocation.   Pulses 2+ DP  no tenderness throughout lower extremity                                11.7   6.17  )-----------( 254      ( 18 Feb 2025 11:03 )             38.3     02-18    136  |  100  |  39[H]  ----------------------------<  194[H]  4.2   |  23  |  1.66[H]    Ca    9.7      18 Feb 2025 11:03      PT/INR - ( 18 Feb 2025 11:03 )   PT: 11.9 sec;   INR: 1.02          PTT - ( 18 Feb 2025 11:03 )  PTT:31.3 sec    Imaging:   CT Pelvis 2/18/25  IMPRESSION:    CT of the pelvis demonstrates a dislocation of the right hip arthroplasty   with the femoral component dislocated superiorly in relation to the   acetabular component. There is no acute displaced fracture. Heterotopic   ossification along the superior margin of the femoral head component as   previously seen.    Lipoma with internal calcification at the anterior lateral abdominal wall.          A/P: 75yo Female with right total hip arthroplasty dislocation, plan for OR 2/20/25 with Dr. Almanzar for revision right total hip arthroplasty    - Admit to medicine for pre op medical optimization with Dr. Douglass prior to OR Thursday with Dr. Almanzar for   - Non-weightbearing RLE bedrest until OR  - DVT prophylaxis preop  - Please document medical clearance    Patient history, exam, imaging and plan discussed with Dr. Almanzar who is in agreement    Ortho Pager [phone]

## 2025-02-18 NOTE — ED ADULT NURSE NOTE - NSFALLHARMRISKINTERV_ED_ALL_ED

## 2025-02-18 NOTE — H&P ADULT - PROBLEM SELECTOR PLAN 2
On admission 185/83. Has had hypertensive urgency during prior admissions as well. On admission 185/83. Has had hypertensive urgency during prior admissions as well.  Home : nifedipine 90 mg AM  Plan:  -resume home meds, if persistently hypertensive consider hydralazine

## 2025-02-18 NOTE — H&P ADULT - PROBLEM SELECTOR PLAN 4
1/2025 A1c 9  Plan: 1/2025 A1c 9  Home: glargine 14 u bedtime, lispro 10 u premeals  Plan:  -EMI iso CKD  -CC diet  -hold pre-meal insulin and resume as tolerated

## 2025-02-18 NOTE — ED ADULT TRIAGE NOTE - CHIEF COMPLAINT QUOTE
[facility]. Pt. had fall 1x month ago. In wheelchair, pain is well controlled.

## 2025-02-18 NOTE — ED PROVIDER NOTE - CLINICAL SUMMARY MEDICAL DECISION MAKING FREE TEXT BOX
74 year old female with history of  lung cancer (w/ mets to brain), CKD, HLD, DM2, RA (follows with Dr. Johnston), RLE DVT (off Eliquis d/t frequent falls), R hip replacement with multiple recent dislocations, sent by Dr. Almanzar for admission for R hip surgery. Well appearing, has no complaints, notably hypertensive on arrival, no end organ dysfunction, NVI distally. Ortho team aware. Will send pre-op labs. Spoke with patient's PMD Dr. Douglass--requests admission under his service.

## 2025-02-18 NOTE — H&P ADULT - PROBLEM SELECTOR PLAN 1
Ortho consulted in ED, s/p closed reduction confirmed by post-reduction XRs 1/2025. Returns due to inability to walk due to recurrent dislocations  Plan:  -f/u R hip CT  -f/u ortho recs  -medical optimization Ortho consulted in ED, s/p closed reduction confirmed by post-reduction XRs 1/2025. Returns due to inability to walk due to recurrent dislocations  CT: CT of the pelvis demonstrates a dislocation of the right hip arthroplasty with the femoral component dislocated superiorly in relation to the acetabular component. There is no acute displaced fracture.  Plan:  -f/u ortho recs  -RCRI: 1 point 6% risk of cardiac event, METS 1 (due to inability to ambulate) Ortho consulted in ED, s/p closed reduction confirmed by post-reduction XRs 1/2025. Returns due to inability to walk due to recurrent dislocations  CT: CT of the pelvis demonstrates a dislocation of the right hip arthroplasty with the femoral component dislocated superiorly in relation to the acetabular component. There is no acute displaced fracture.  Plan:  -f/u ortho recs  -RCRI: 1 point 6% risk of cardiac event, METS 1 (due to inability to ambulate)  -pain control: tylenol 650mg q6 prn, tramadol 25 mg q6 prn

## 2025-02-18 NOTE — H&P ADULT - PROBLEM SELECTOR PLAN 6
Was seen last be Dr Delaney 12/24. A that time on gemcitabine with therapy, given on day 1 and day 8 of each 21-day cycle. Only receiving chemo intermittently due to missed appointments from hospital admission.

## 2025-02-18 NOTE — H&P ADULT - PROBLEM SELECTOR PLAN 8
Was previously on eliquis, but taken off due to multiple falls. Had LE dopplers at last admission 1/2025 that were negative for DVT  Plan: Was previously on eliquis, but taken off due to multiple falls. Had LE dopplers at last admission 1/2025 that were negative for DVT  -bilateral LE tender to palpation with 1+ edema bilaterally, has been sedentary for nearly a month at minimum  Plan: Was previously on eliquis, but taken off due to multiple falls. Had LE dopplers at last admission 1/2025 that were negative for DVT  -bilateral LE tender to palpation with 1+ edema bilaterally, has been sedentary for nearly a month at minimum  Wells: 3  Plan:  -f/u d-dimer

## 2025-02-18 NOTE — ED PROVIDER NOTE - OBJECTIVE STATEMENT
74 year old female with history of  lung cancer (w/ mets to brain), CKD, HLD, DM2, RA (follows with Dr. Johnston), RLE DVT (off Eliquis d/t frequent falls), R hip replacement with multiple recent dislocations, sent by Dr. Almanzar for admission for R hip surgery. States that she has been unable to walk since December due to continued R hip dislocation. Went to Dr. Almanzar's office today for follow up appointment and had XR imaging showing R hip dislocation--decision made to undergo R hip surgery. Denies recent illness, f/c, chest pain, sob, cough, n/v/d.

## 2025-02-18 NOTE — PROGRESS NOTE ADULT - SUBJECTIVE AND OBJECTIVE BOX
INTERVAL HPI/OVERNIGHT EVENTS:  Events noted;  Recurrent dislocation of right hip ; Will go to OR Thursday   Medical history as outlined   Lung Cancer with brain mets ;  Also diabetes;   Has some dementia and has difficulty taking care of herself at home; Has been in [facility]      MEDICATIONS  (STANDING):  atorvastatin 40 milliGRAM(s) Oral at bedtime  dextrose 5%. 1000 milliLiter(s) (50 mL/Hr) IV Continuous <Continuous>  dextrose 5%. 1000 milliLiter(s) (100 mL/Hr) IV Continuous <Continuous>  dextrose 50% Injectable 25 Gram(s) IV Push once  dextrose 50% Injectable 12.5 Gram(s) IV Push once  dextrose 50% Injectable 25 Gram(s) IV Push once  glucagon  Injectable 1 milliGRAM(s) IntraMuscular once  heparin   Injectable 5000 Unit(s) SubCutaneous every 8 hours  insulin glargine Injectable (LANTUS) 14 Unit(s) SubCutaneous at bedtime  insulin lispro (ADMELOG) corrective regimen sliding scale   SubCutaneous three times a day before meals  polyethylene glycol 3350 17 Gram(s) Oral every 24 hours  senna 2 Tablet(s) Oral at bedtime    MEDICATIONS  (PRN):  acetaminophen     Tablet .. 650 milliGRAM(s) Oral every 6 hours PRN Temp greater or equal to 38C (100.4F), Mild Pain (1 - 3)  dextrose Oral Gel 15 Gram(s) Oral once PRN Blood Glucose LESS THAN 70 milliGRAM(s)/deciliter  traMADol 25 milliGRAM(s) Oral every 6 hours PRN Moderate Pain (4 - 6)      Allergies    citrus (Urticaria)  strawberry (Pruritus)  Bactrim (Rash)    Intolerances        Vital Signs Last 24 Hrs  T(C): 36.7 (18 Feb 2025 13:32), Max: 36.7 (18 Feb 2025 13:32)  T(F): 98 (18 Feb 2025 13:32), Max: 98 (18 Feb 2025 13:32)  HR: 59 (18 Feb 2025 13:32) (59 - 71)  BP: 184/82 (18 Feb 2025 13:32) (184/82 - 185/83)  BP(mean): --  RR: 18 (18 Feb 2025 13:32) (18 - 18)  SpO2: 96% (18 Feb 2025 13:32) (96% - 98%)    Parameters below as of 18 Feb 2025 13:32  Patient On (Oxygen Delivery Method): room air              Constitutional:  Awake and alert     Eyes: NEDRA    ENMT: Negative    Neck: Supple    Back:  no tenderness     Respiratory:  clear    Cardiovascular: S1 S2    Gastrointestinal: soft     Genitourinary:    Extremities: no edema or pain lower extremities     Vascular:    Neurological:  Awake and alert     Skin:    Lymph Nodes:            LABS:                        11.7   6.17  )-----------( 254      ( 18 Feb 2025 11:03 )             38.3     02-18    136  |  100  |  39[H]  ----------------------------<  194[H]  4.2   |  23  |  1.66[H]    Ca    9.7      18 Feb 2025 11:03      PT/INR - ( 18 Feb 2025 11:03 )   PT: 11.9 sec;   INR: 1.02          PTT - ( 18 Feb 2025 11:03 )  PTT:31.3 sec  Urinalysis Basic - ( 18 Feb 2025 11:03 )    Color: x / Appearance: x / SG: x / pH: x  Gluc: 194 mg/dL / Ketone: x  / Bili: x / Urobili: x   Blood: x / Protein: x / Nitrite: x   Leuk Esterase: x / RBC: x / WBC x   Sq Epi: x / Non Sq Epi: x / Bacteria: x        RADIOLOGY & ADDITIONAL TESTS:

## 2025-02-18 NOTE — H&P ADULT - HISTORY OF PRESENT ILLNESS
74 year old female with history of  lung cancer (w/ mets to brain), CKD, HLD, DM2, RA (follows with Dr. Johnston), RLE DVT (off Eliquis d/t frequent falls), R hip replacement with multiple recent dislocations(1/13/25 with closed reduction done at that time), sent by Dr. Almanzar for admission for R hip surgery. States that she has been unable to walk since December due to continued R hip dislocation. Went to Dr. Almanzar's office today for follow up appointment and had XR imaging showing R hip dislocation--decision made to undergo R hip surgery. Denies recent illness, f/c, chest pain, sob, cough, n/v/d.    ED COURSE  Vitals: 97.6F, HR 71, /83,  RR 18 98%  Significant labs: BUN 39, Cr 1.66, glucose 194  Ecg: bradycardia with PACs  Interventions: none 74 year old female with history of  lung cancer (w/ mets to brain), CKD, HLD, DM2, RA (follows with Dr. Johnston), RLE DVT (off Eliquis d/t frequent falls), R hip replacement with multiple recent dislocations(1/13/25 with closed reduction done at that time), sent by Dr. Almanzar for admission for R hip surgery. States that she has been unable to walk since December due to continued R hip dislocation. She is currently residing at [facility] and notes she has not been walking since she got there due to the dislocation. Denies current pain at the joint site and denies recent falls since her hospitalization. Went to Dr. Almanzar's office today for follow up appointment and had XR imaging showing R hip dislocation--decision made to undergo R hip surgery. Denies recent illness, f/c, chest pain, sob, cough, n/v, dysuria.    ED COURSE  Vitals: 97.6F, HR 71, /83,  RR 18 98%  Significant labs: BUN 39, Cr 1.66, glucose 194  Ecg: bradycardia with PACs  Interventions: none 74 year old female with history of  lung cancer (w/ mets to brain), CKD, HLD, DM2, RA (follows with Dr. Johnston), RLE DVT (off Eliquis d/t frequent falls), R hip replacement with multiple recent dislocations(1/13/25 with closed reduction done at that time), sent by Dr. Almanzar for admission for R hip surgery. States that she has been unable to walk since December due to continued R hip dislocation. She is currently residing at [facility] and notes she has not been walking since she got there due to the dislocation. Denies current pain at the joint site and denies recent falls since her hospitalization. Went to Dr. Almanzar's office today for follow up appointment and had XR imaging showing R hip dislocation--decision made to undergo R hip surgery. Denies recent illness, f/c, chest pain, sob, cough, n/v, dysuria.    ED COURSE  Vitals: 97.6F, HR 71, /83,  RR 18 98%  Significant labs: BUN 39, Cr 1.66, glucose 194  Ecg: bradycardia with PACs HR 58  Interventions: none

## 2025-02-18 NOTE — H&P ADULT - ASSESSMENT
74 year old female with history of  lung cancer (w/ mets to brain), CKD, HLD, DM2, RA (follows with Dr. Johnston), RLE DVT (off Eliquis d/t frequent falls), R hip replacement with multiple recent dislocations(1/13/25 with closed reduction done at that time), sent by Dr. Almanzar for admission for R hip surgery and medical optimization.

## 2025-02-19 ENCOUNTER — TRANSCRIPTION ENCOUNTER (OUTPATIENT)
Age: 75
End: 2025-02-19

## 2025-02-19 ENCOUNTER — RESULT REVIEW (OUTPATIENT)
Age: 75
End: 2025-02-19

## 2025-02-19 DIAGNOSIS — Z01.818 ENCOUNTER FOR OTHER PREPROCEDURAL EXAMINATION: ICD-10-CM

## 2025-02-19 LAB
ANION GAP SERPL CALC-SCNC: 11 MMOL/L — SIGNIFICANT CHANGE UP (ref 5–17)
BASOPHILS # BLD AUTO: 0.05 K/UL — SIGNIFICANT CHANGE UP (ref 0–0.2)
BASOPHILS NFR BLD AUTO: 0.7 % — SIGNIFICANT CHANGE UP (ref 0–2)
BLD GP AB SCN SERPL QL: NEGATIVE — SIGNIFICANT CHANGE UP
BUN SERPL-MCNC: 38 MG/DL — HIGH (ref 7–23)
CALCIUM SERPL-MCNC: 9.5 MG/DL — SIGNIFICANT CHANGE UP (ref 8.4–10.5)
CHLORIDE SERPL-SCNC: 108 MMOL/L — SIGNIFICANT CHANGE UP (ref 96–108)
CO2 SERPL-SCNC: 24 MMOL/L — SIGNIFICANT CHANGE UP (ref 22–31)
CREAT SERPL-MCNC: 1.71 MG/DL — HIGH (ref 0.5–1.3)
EGFR: 31 ML/MIN/1.73M2 — LOW
EOSINOPHIL # BLD AUTO: 0.33 K/UL — SIGNIFICANT CHANGE UP (ref 0–0.5)
EOSINOPHIL NFR BLD AUTO: 4.9 % — SIGNIFICANT CHANGE UP (ref 0–6)
GLUCOSE SERPL-MCNC: 94 MG/DL — SIGNIFICANT CHANGE UP (ref 70–99)
HCT VFR BLD CALC: 35.8 % — SIGNIFICANT CHANGE UP (ref 34.5–45)
HGB BLD-MCNC: 10.7 G/DL — LOW (ref 11.5–15.5)
IMM GRANULOCYTES NFR BLD AUTO: 0.3 % — SIGNIFICANT CHANGE UP (ref 0–0.9)
LYMPHOCYTES # BLD AUTO: 1.43 K/UL — SIGNIFICANT CHANGE UP (ref 1–3.3)
LYMPHOCYTES # BLD AUTO: 21.2 % — SIGNIFICANT CHANGE UP (ref 13–44)
MAGNESIUM SERPL-MCNC: 2 MG/DL — SIGNIFICANT CHANGE UP (ref 1.6–2.6)
MCHC RBC-ENTMCNC: 25.7 PG — LOW (ref 27–34)
MCHC RBC-ENTMCNC: 29.9 G/DL — LOW (ref 32–36)
MCV RBC AUTO: 86.1 FL — SIGNIFICANT CHANGE UP (ref 80–100)
MONOCYTES # BLD AUTO: 0.71 K/UL — SIGNIFICANT CHANGE UP (ref 0–0.9)
MONOCYTES NFR BLD AUTO: 10.5 % — SIGNIFICANT CHANGE UP (ref 2–14)
NEUTROPHILS # BLD AUTO: 4.2 K/UL — SIGNIFICANT CHANGE UP (ref 1.8–7.4)
NEUTROPHILS NFR BLD AUTO: 62.4 % — SIGNIFICANT CHANGE UP (ref 43–77)
NRBC BLD AUTO-RTO: 0 /100 WBCS — SIGNIFICANT CHANGE UP (ref 0–0)
PHOSPHATE SERPL-MCNC: 3.9 MG/DL — SIGNIFICANT CHANGE UP (ref 2.5–4.5)
PLATELET # BLD AUTO: 224 K/UL — SIGNIFICANT CHANGE UP (ref 150–400)
POTASSIUM SERPL-MCNC: 4.1 MMOL/L — SIGNIFICANT CHANGE UP (ref 3.5–5.3)
POTASSIUM SERPL-SCNC: 4.1 MMOL/L — SIGNIFICANT CHANGE UP (ref 3.5–5.3)
RBC # BLD: 4.16 M/UL — SIGNIFICANT CHANGE UP (ref 3.8–5.2)
RBC # FLD: 16.3 % — HIGH (ref 10.3–14.5)
RH IG SCN BLD-IMP: POSITIVE — SIGNIFICANT CHANGE UP
SODIUM SERPL-SCNC: 143 MMOL/L — SIGNIFICANT CHANGE UP (ref 135–145)
WBC # BLD: 6.74 K/UL — SIGNIFICANT CHANGE UP (ref 3.8–10.5)
WBC # FLD AUTO: 6.74 K/UL — SIGNIFICANT CHANGE UP (ref 3.8–10.5)

## 2025-02-19 PROCEDURE — 99232 SBSQ HOSP IP/OBS MODERATE 35: CPT | Mod: GC

## 2025-02-19 PROCEDURE — 93306 TTE W/DOPPLER COMPLETE: CPT | Mod: 26

## 2025-02-19 RX ORDER — HEPARIN SODIUM 1000 [USP'U]/ML
5000 INJECTION INTRAVENOUS; SUBCUTANEOUS EVERY 8 HOURS
Refills: 0 | Status: COMPLETED | OUTPATIENT
Start: 2025-02-19 | End: 2025-02-19

## 2025-02-19 RX ADMIN — INSULIN GLARGINE-YFGN 14 UNIT(S): 100 INJECTION, SOLUTION SUBCUTANEOUS at 22:44

## 2025-02-19 RX ADMIN — HEPARIN SODIUM 5000 UNIT(S): 1000 INJECTION INTRAVENOUS; SUBCUTANEOUS at 06:29

## 2025-02-19 RX ADMIN — Medication 1 APPLICATION(S): at 06:36

## 2025-02-19 RX ADMIN — HEPARIN SODIUM 5000 UNIT(S): 1000 INJECTION INTRAVENOUS; SUBCUTANEOUS at 22:45

## 2025-02-19 RX ADMIN — Medication 2 TABLET(S): at 22:44

## 2025-02-19 RX ADMIN — LIDOCAINE HYDROCHLORIDE 2 PATCH: 20 JELLY TOPICAL at 06:31

## 2025-02-19 RX ADMIN — ATORVASTATIN CALCIUM 40 MILLIGRAM(S): 80 TABLET, FILM COATED ORAL at 22:44

## 2025-02-19 RX ADMIN — HEPARIN SODIUM 5000 UNIT(S): 1000 INJECTION INTRAVENOUS; SUBCUTANEOUS at 16:33

## 2025-02-19 RX ADMIN — INSULIN LISPRO 1: 100 INJECTION, SOLUTION INTRAVENOUS; SUBCUTANEOUS at 22:47

## 2025-02-19 RX ADMIN — Medication 90 MILLIGRAM(S): at 11:29

## 2025-02-19 RX ADMIN — Medication 1 APPLICATION(S): at 19:37

## 2025-02-19 NOTE — PATIENT PROFILE ADULT - FUNCTIONAL ASSESSMENT - BASIC MOBILITY 6.
1-calculated by average/Not able to assess (calculate score using [facility] averaging method)

## 2025-02-19 NOTE — PROGRESS NOTE ADULT - PROBLEM SELECTOR PLAN 8
Was previously on eliquis, but taken off due to multiple falls. Had LE dopplers at last admission 1/2025 that were negative for DVT  Bilateral LE tender to palpation with 1+ edema bilaterally, has been sedentary for nearly a month at minimum  Wells: 3  D-Dimer 1183

## 2025-02-19 NOTE — PATIENT PROFILE ADULT - NSPROPTRIGHTCAREGIVER_GEN_A_NUR
Received report from day shift RN, assumed pt care. A&O x 2-3. No report of pain. Fall precautions in place. Call light and bedside table within reach. Assessment completed. Will continue to monitor.      no

## 2025-02-19 NOTE — PROGRESS NOTE ADULT - SUBJECTIVE AND OBJECTIVE BOX
INTERVAL HPI/OVERNIGHT EVENTS:  All notes reviewed;  Will go to OR tomorrow at 2:30   ECHO noted ; Good LV function      MEDICATIONS  (STANDING):  atorvastatin 40 milliGRAM(s) Oral at bedtime  chlorhexidine 2% Cloths 1 Application(s) Topical every 12 hours  dextrose 5%. 1000 milliLiter(s) (50 mL/Hr) IV Continuous <Continuous>  dextrose 5%. 1000 milliLiter(s) (100 mL/Hr) IV Continuous <Continuous>  dextrose 50% Injectable 25 Gram(s) IV Push once  dextrose 50% Injectable 12.5 Gram(s) IV Push once  dextrose 50% Injectable 25 Gram(s) IV Push once  glucagon  Injectable 1 milliGRAM(s) IntraMuscular once  heparin   Injectable 5000 Unit(s) SubCutaneous every 8 hours  insulin glargine Injectable (LANTUS) 14 Unit(s) SubCutaneous at bedtime  insulin lispro (ADMELOG) corrective regimen sliding scale   SubCutaneous Before meals and at bedtime  lidocaine   4% Patch 2 Patch Transdermal every 24 hours  NIFEdipine XL 90 milliGRAM(s) Oral every 24 hours  polyethylene glycol 3350 17 Gram(s) Oral every 24 hours  senna 2 Tablet(s) Oral at bedtime    MEDICATIONS  (PRN):  acetaminophen     Tablet .. 650 milliGRAM(s) Oral every 6 hours PRN Temp greater or equal to 38C (100.4F), Mild Pain (1 - 3)  dextrose Oral Gel 15 Gram(s) Oral once PRN Blood Glucose LESS THAN 70 milliGRAM(s)/deciliter  traMADol 25 milliGRAM(s) Oral every 6 hours PRN Moderate Pain (4 - 6)      Allergies    citrus (Urticaria)  strawberry (Pruritus)  Bactrim (Rash)    Intolerances        Vital Signs Last 24 Hrs  T(C): 37.1 (19 Feb 2025 16:34), Max: 37.6 (18 Feb 2025 18:45)  T(F): 98.8 (19 Feb 2025 16:34), Max: 99.6 (18 Feb 2025 18:45)  HR: 72 (19 Feb 2025 16:34) (72 - 92)  BP: 163/83 (19 Feb 2025 16:34) (117/64 - 186/81)  BP(mean): 104 (19 Feb 2025 00:20) (104 - 104)  RR: 18 (19 Feb 2025 16:34) (17 - 18)  SpO2: 96% (19 Feb 2025 16:34) (96% - 99%)    Parameters below as of 19 Feb 2025 16:34  Patient On (Oxygen Delivery Method): room air              Constitutional: Awake     Eyes: NEDRA    ENMT: Negative    Neck: Supple    Back:  no tenderness     Respiratory:  clear     Cardiovascular: S1 S2    Gastrointestinal:  soft     Genitourinary:    Extremities: no edema     Vascular:    Neurological:    Skin:    Lymph Nodes:            LABS:                        10.7   6.74  )-----------( 224      ( 19 Feb 2025 05:30 )             35.8     02-19    143  |  108  |  38[H]  ----------------------------<  94  4.1   |  24  |  1.71[H]    Ca    9.5      19 Feb 2025 05:30  Phos  3.9     02-19  Mg     2.0     02-19      PT/INR - ( 18 Feb 2025 11:03 )   PT: 11.9 sec;   INR: 1.02          PTT - ( 18 Feb 2025 11:03 )  PTT:31.3 sec  Urinalysis Basic - ( 19 Feb 2025 05:30 )    Color: x / Appearance: x / SG: x / pH: x  Gluc: 94 mg/dL / Ketone: x  / Bili: x / Urobili: x   Blood: x / Protein: x / Nitrite: x   Leuk Esterase: x / RBC: x / WBC x   Sq Epi: x / Non Sq Epi: x / Bacteria: x        RADIOLOGY & ADDITIONAL TESTS:

## 2025-02-19 NOTE — PROGRESS NOTE ADULT - PROBLEM SELECTOR PLAN 2
Surgery: Revision right total hip arthroplasty    - Pre-op labs (CBC, CMP, PT, PTT, INR, T/S,)  - EKG - NSR  - CXR - no acute cardiopulmonary disease process  - METS 1   - RCRI -1 point 6% risk of cardiac event  - Gloria score - 0.1% risk of MI or cardiac arrest, intraoperatively or up to 30 days post-op    Pt medically cleared for surgery.

## 2025-02-19 NOTE — PROGRESS NOTE ADULT - SUBJECTIVE AND OBJECTIVE BOX
Ortho Note    Pt comfortable without complaints, pain controlled  Denies CP, SOB, N/V, numbness/tingling     Vital Signs Last 24 Hrs  T(C): 36.8 (02-19-25 @ 05:43), Max: 36.8 (02-19-25 @ 05:43)  T(F): 98.2 (02-19-25 @ 05:43), Max: 98.2 (02-19-25 @ 05:43)  HR: 86 (02-19-25 @ 05:43) (86 - 86)  BP: 139/75 (02-19-25 @ 05:43) (139/75 - 139/75)  BP(mean): --  RR: 18 (02-19-25 @ 05:43) (18 - 18)  SpO2: 98% (02-19-25 @ 05:43) (98% - 98%)  I&O's Summary    AFVSS  General: Pt Alert and oriented, NAD  Pulses: 2+ DP  Sensation: SILT  Motor: EHL/FHL/TA/GS 5/5                          10.7   6.74  )-----------( 224      ( 19 Feb 2025 05:30 )             35.8     02-19    143  |  108  |  38[H]  ----------------------------<  94  4.1   |  24  |  1.71[H]    Ca    9.5      19 Feb 2025 05:30  Phos  3.9     02-19  Mg     2.0     02-19        A/P: 73yo Female with right total hip arthroplasty dislocation, plan for OR 2/20/25 with Dr. Almanzar for revision right total hip arthroplasty  - Stable  - Med clearance documentation  - Anesthesia clearance   -?Pre-op labs and imaging?-?CXR, EKG, COVID, CBC, BMP, T&S/ABO, PT/PTT/INR,   - NPO MN prior to OR   -?NWB?RLE   - AC per primary   -?Analgesia PRN?per primary   -?B/l SCDs?       Ortho Pager [phone]

## 2025-02-19 NOTE — PATIENT PROFILE ADULT - HAS THE PATIENT USED TOBACCO IN THE PAST 30 DAYS?
"You were seen in the clinic today by Dr. Rock. If you have any questions or concerns after your appointment, please call the clinic at [phone]. Press \"1\" for scheduling, press \"3\" for nurse advice.    2.   The following has been recommended for you based upon your appointment today:      -A nasal spray has been ordered for your symptoms; use as directed.    3.   Plan to return the clinic in 1 year for follow-up.    Nancy VELAZQUEZ, RN  [facility]  Department of Otolaryngology  [phone]      " No

## 2025-02-19 NOTE — PATIENT PROFILE ADULT - FALL HARM RISK - HARM RISK INTERVENTIONS

## 2025-02-19 NOTE — PATIENT PROFILE ADULT - NSTRANSFERBELONGINGSDISPO_GEN_A_NUR
A thrombectomy was performed Lesion documentation: manual aspiration of thrombus with catheter with patient

## 2025-02-19 NOTE — PROGRESS NOTE ADULT - SUBJECTIVE AND OBJECTIVE BOX
OVERNIGHT EVENTS: NAEO    SUBJECTIVE / INTERVAL HPI: Patient seen and examined at bedside this morning. Pt offers no c/o any sort. Patient denying chest pain, SOB, palpitations, cough.     Remaining ROS negative       PHYSICAL EXAM:  General:NAD, appears comfortable    HEENT:  PERRL, anicteric sclera  Cardiovascular:  RRR  Respiratory: CTA B/L  Gastrointestinal: soft, NT/ND; +BSx4  Extremities: no edema to LE  Vascular: 2+ radial pulses  Neurological: AAOx3; no focal deficits      VITAL SIGNS:  Vital Signs Last 24 Hrs  T(C): 37.1 (19 Feb 2025 16:34), Max: 37.6 (18 Feb 2025 18:45)  T(F): 98.8 (19 Feb 2025 16:34), Max: 99.6 (18 Feb 2025 18:45)  HR: 72 (19 Feb 2025 16:34) (72 - 92)  BP: 163/83 (19 Feb 2025 16:34) (117/64 - 186/81)  BP(mean): 104 (19 Feb 2025 00:20) (104 - 104)  RR: 18 (19 Feb 2025 16:34) (17 - 18)  SpO2: 96% (19 Feb 2025 16:34) (96% - 99%)    Parameters below as of 19 Feb 2025 16:34  Patient On (Oxygen Delivery Method): room air      MEDICATIONS:  MEDICATIONS  (STANDING):  atorvastatin 40 milliGRAM(s) Oral at bedtime  chlorhexidine 2% Cloths 1 Application(s) Topical every 12 hours  dextrose 5%. 1000 milliLiter(s) (50 mL/Hr) IV Continuous <Continuous>  dextrose 5%. 1000 milliLiter(s) (100 mL/Hr) IV Continuous <Continuous>  dextrose 50% Injectable 25 Gram(s) IV Push once  dextrose 50% Injectable 12.5 Gram(s) IV Push once  dextrose 50% Injectable 25 Gram(s) IV Push once  glucagon  Injectable 1 milliGRAM(s) IntraMuscular once  heparin   Injectable 5000 Unit(s) SubCutaneous every 8 hours  insulin glargine Injectable (LANTUS) 14 Unit(s) SubCutaneous at bedtime  insulin lispro (ADMELOG) corrective regimen sliding scale   SubCutaneous Before meals and at bedtime  lidocaine   4% Patch 2 Patch Transdermal every 24 hours  NIFEdipine XL 90 milliGRAM(s) Oral every 24 hours  polyethylene glycol 3350 17 Gram(s) Oral every 24 hours  senna 2 Tablet(s) Oral at bedtime    MEDICATIONS  (PRN):  acetaminophen     Tablet .. 650 milliGRAM(s) Oral every 6 hours PRN Temp greater or equal to 38C (100.4F), Mild Pain (1 - 3)  dextrose Oral Gel 15 Gram(s) Oral once PRN Blood Glucose LESS THAN 70 milliGRAM(s)/deciliter  traMADol 25 milliGRAM(s) Oral every 6 hours PRN Moderate Pain (4 - 6)      ALLERGIES:  Allergies    citrus (Urticaria)  strawberry (Pruritus)  Bactrim (Rash)    Intolerances        LABS:                        10.7   6.74  )-----------( 224      ( 19 Feb 2025 05:30 )             35.8     02-19    143  |  108  |  38[H]  ----------------------------<  94  4.1   |  24  |  1.71[H]    Ca    9.5      19 Feb 2025 05:30  Phos  3.9     02-19  Mg     2.0     02-19      PT/INR - ( 18 Feb 2025 11:03 )   PT: 11.9 sec;   INR: 1.02          PTT - ( 18 Feb 2025 11:03 )  PTT:31.3 sec  Urinalysis Basic - ( 19 Feb 2025 05:30 )    Color: x / Appearance: x / SG: x / pH: x  Gluc: 94 mg/dL / Ketone: x  / Bili: x / Urobili: x   Blood: x / Protein: x / Nitrite: x   Leuk Esterase: x / RBC: x / WBC x   Sq Epi: x / Non Sq Epi: x / Bacteria: x      CAPILLARY BLOOD GLUCOSE      POCT Blood Glucose.: 134 mg/dL (19 Feb 2025 18:12)      RADIOLOGY & ADDITIONAL TESTS: Reviewed.

## 2025-02-20 ENCOUNTER — APPOINTMENT (OUTPATIENT)
Dept: ORTHOPEDIC SURGERY | Facility: HOSPITAL | Age: 75
End: 2025-02-20

## 2025-02-20 ENCOUNTER — TRANSCRIPTION ENCOUNTER (OUTPATIENT)
Age: 75
End: 2025-02-20

## 2025-02-20 LAB
ANION GAP SERPL CALC-SCNC: 15 MMOL/L — SIGNIFICANT CHANGE UP (ref 5–17)
APPEARANCE UR: CLEAR — SIGNIFICANT CHANGE UP
APTT BLD: 33.4 SEC — SIGNIFICANT CHANGE UP (ref 24.5–35.6)
BASOPHILS # BLD AUTO: 0.06 K/UL — SIGNIFICANT CHANGE UP (ref 0–0.2)
BASOPHILS NFR BLD AUTO: 0.8 % — SIGNIFICANT CHANGE UP (ref 0–2)
BILIRUB UR-MCNC: NEGATIVE — SIGNIFICANT CHANGE UP
BLD GP AB SCN SERPL QL: NEGATIVE — SIGNIFICANT CHANGE UP
BUN SERPL-MCNC: 39 MG/DL — HIGH (ref 7–23)
CALCIUM SERPL-MCNC: 9.6 MG/DL — SIGNIFICANT CHANGE UP (ref 8.4–10.5)
CHLORIDE SERPL-SCNC: 104 MMOL/L — SIGNIFICANT CHANGE UP (ref 96–108)
CO2 SERPL-SCNC: 23 MMOL/L — SIGNIFICANT CHANGE UP (ref 22–31)
COLOR SPEC: YELLOW — SIGNIFICANT CHANGE UP
CREAT SERPL-MCNC: 1.71 MG/DL — HIGH (ref 0.5–1.3)
DIFF PNL FLD: NEGATIVE — SIGNIFICANT CHANGE UP
EGFR: 31 ML/MIN/1.73M2 — LOW
EOSINOPHIL # BLD AUTO: 0.27 K/UL — SIGNIFICANT CHANGE UP (ref 0–0.5)
EOSINOPHIL NFR BLD AUTO: 3.6 % — SIGNIFICANT CHANGE UP (ref 0–6)
GLUCOSE SERPL-MCNC: 77 MG/DL — SIGNIFICANT CHANGE UP (ref 70–99)
GLUCOSE UR QL: NEGATIVE MG/DL — SIGNIFICANT CHANGE UP
HCT VFR BLD CALC: 34.2 % — LOW (ref 34.5–45)
HGB BLD-MCNC: 10.5 G/DL — LOW (ref 11.5–15.5)
IMM GRANULOCYTES NFR BLD AUTO: 0.3 % — SIGNIFICANT CHANGE UP (ref 0–0.9)
INR BLD: 1.1 — SIGNIFICANT CHANGE UP (ref 0.85–1.16)
KETONES UR-MCNC: NEGATIVE MG/DL — SIGNIFICANT CHANGE UP
LEUKOCYTE ESTERASE UR-ACNC: NEGATIVE — SIGNIFICANT CHANGE UP
LYMPHOCYTES # BLD AUTO: 2.17 K/UL — SIGNIFICANT CHANGE UP (ref 1–3.3)
LYMPHOCYTES # BLD AUTO: 28.8 % — SIGNIFICANT CHANGE UP (ref 13–44)
MAGNESIUM SERPL-MCNC: 1.8 MG/DL — SIGNIFICANT CHANGE UP (ref 1.6–2.6)
MCHC RBC-ENTMCNC: 27.1 PG — SIGNIFICANT CHANGE UP (ref 27–34)
MCHC RBC-ENTMCNC: 30.7 G/DL — LOW (ref 32–36)
MCV RBC AUTO: 88.4 FL — SIGNIFICANT CHANGE UP (ref 80–100)
MONOCYTES # BLD AUTO: 0.68 K/UL — SIGNIFICANT CHANGE UP (ref 0–0.9)
MONOCYTES NFR BLD AUTO: 9 % — SIGNIFICANT CHANGE UP (ref 2–14)
NEUTROPHILS # BLD AUTO: 4.34 K/UL — SIGNIFICANT CHANGE UP (ref 1.8–7.4)
NEUTROPHILS NFR BLD AUTO: 57.5 % — SIGNIFICANT CHANGE UP (ref 43–77)
NITRITE UR-MCNC: NEGATIVE — SIGNIFICANT CHANGE UP
NRBC BLD AUTO-RTO: 0 /100 WBCS — SIGNIFICANT CHANGE UP (ref 0–0)
PH UR: 5.5 — SIGNIFICANT CHANGE UP (ref 5–8)
PHOSPHATE SERPL-MCNC: 3.9 MG/DL — SIGNIFICANT CHANGE UP (ref 2.5–4.5)
PLATELET # BLD AUTO: 231 K/UL — SIGNIFICANT CHANGE UP (ref 150–400)
POTASSIUM SERPL-MCNC: 3.8 MMOL/L — SIGNIFICANT CHANGE UP (ref 3.5–5.3)
POTASSIUM SERPL-SCNC: 3.8 MMOL/L — SIGNIFICANT CHANGE UP (ref 3.5–5.3)
PROT UR-MCNC: 300 MG/DL
PROTHROM AB SERPL-ACNC: 12.9 SEC — SIGNIFICANT CHANGE UP (ref 9.9–13.4)
RBC # BLD: 3.87 M/UL — SIGNIFICANT CHANGE UP (ref 3.8–5.2)
RBC # FLD: 16.7 % — HIGH (ref 10.3–14.5)
RH IG SCN BLD-IMP: POSITIVE — SIGNIFICANT CHANGE UP
SODIUM SERPL-SCNC: 142 MMOL/L — SIGNIFICANT CHANGE UP (ref 135–145)
SP GR SPEC: 1.03 — SIGNIFICANT CHANGE UP (ref 1–1.03)
UROBILINOGEN FLD QL: 0.2 MG/DL — SIGNIFICANT CHANGE UP (ref 0.2–1)
WBC # BLD: 7.54 K/UL — SIGNIFICANT CHANGE UP (ref 3.8–10.5)
WBC # FLD AUTO: 7.54 K/UL — SIGNIFICANT CHANGE UP (ref 3.8–10.5)

## 2025-02-20 PROCEDURE — 72170 X-RAY EXAM OF PELVIS: CPT | Mod: 26

## 2025-02-20 PROCEDURE — 27134 REVISE HIP JOINT REPLACEMENT: CPT | Mod: 78,52,RT

## 2025-02-20 PROCEDURE — 99232 SBSQ HOSP IP/OBS MODERATE 35: CPT | Mod: GC

## 2025-02-20 DEVICE — SCREW ACET SELF TAP 6.5X20MM: Type: IMPLANTABLE DEVICE | Site: RIGHT | Status: FUNCTIONAL

## 2025-02-20 DEVICE — SCREW ACET SELF TAP 6.5X35MM: Type: IMPLANTABLE DEVICE | Site: RIGHT | Status: FUNCTIONAL

## 2025-02-20 DEVICE — IMPLANTABLE DEVICE: Type: IMPLANTABLE DEVICE | Site: RIGHT | Status: FUNCTIONAL

## 2025-02-20 DEVICE — CELLERATE SURGICAL POWDER RX 5GM: Type: IMPLANTABLE DEVICE | Site: RIGHT | Status: FUNCTIONAL

## 2025-02-20 DEVICE — SCREW BONE SLF TAP 6.5X40MM: Type: IMPLANTABLE DEVICE | Site: RIGHT | Status: FUNCTIONAL

## 2025-02-20 RX ORDER — OXYCODONE HYDROCHLORIDE 30 MG/1
10 TABLET ORAL
Refills: 0 | Status: DISCONTINUED | OUTPATIENT
Start: 2025-02-20 | End: 2025-02-21

## 2025-02-20 RX ORDER — MAGNESIUM HYDROXIDE 400 MG/5ML
30 SUSPENSION ORAL DAILY
Refills: 0 | Status: DISCONTINUED | OUTPATIENT
Start: 2025-02-20 | End: 2025-02-21

## 2025-02-20 RX ORDER — SODIUM CHLORIDE 9 G/1000ML
1000 INJECTION, SOLUTION INTRAVENOUS
Refills: 0 | Status: DISCONTINUED | OUTPATIENT
Start: 2025-02-20 | End: 2025-02-25

## 2025-02-20 RX ORDER — ASPIRIN 325 MG
81 TABLET ORAL
Refills: 0 | Status: DISCONTINUED | OUTPATIENT
Start: 2025-02-21 | End: 2025-02-21

## 2025-02-20 RX ORDER — POLYETHYLENE GLYCOL 3350 17 G/17G
17 POWDER, FOR SOLUTION ORAL AT BEDTIME
Refills: 0 | Status: DISCONTINUED | OUTPATIENT
Start: 2025-02-20 | End: 2025-02-21

## 2025-02-20 RX ORDER — ACETAMINOPHEN 500 MG/5ML
1000 LIQUID (ML) ORAL ONCE
Refills: 0 | Status: COMPLETED | OUTPATIENT
Start: 2025-02-20 | End: 2025-02-20

## 2025-02-20 RX ORDER — CEFAZOLIN SODIUM IN 0.9 % NACL 3 G/100 ML
2000 INTRAVENOUS SOLUTION, PIGGYBACK (ML) INTRAVENOUS EVERY 8 HOURS
Refills: 0 | Status: COMPLETED | OUTPATIENT
Start: 2025-02-21 | End: 2025-02-21

## 2025-02-20 RX ORDER — SENNA 187 MG
2 TABLET ORAL AT BEDTIME
Refills: 0 | Status: DISCONTINUED | OUTPATIENT
Start: 2025-02-20 | End: 2025-02-21

## 2025-02-20 RX ORDER — APREPITANT 40 MG/1
40 CAPSULE ORAL ONCE
Refills: 0 | Status: COMPLETED | OUTPATIENT
Start: 2025-02-20 | End: 2025-02-20

## 2025-02-20 RX ORDER — HYDROMORPHONE/SOD CHLOR,ISO/PF 2 MG/10 ML
0.5 SYRINGE (ML) INJECTION
Refills: 0 | Status: DISCONTINUED | OUTPATIENT
Start: 2025-02-20 | End: 2025-02-21

## 2025-02-20 RX ORDER — HYDROMORPHONE/SOD CHLOR,ISO/PF 2 MG/10 ML
0.5 SYRINGE (ML) INJECTION ONCE
Refills: 0 | Status: COMPLETED | OUTPATIENT
Start: 2025-02-20

## 2025-02-20 RX ORDER — ONDANSETRON HCL/PF 4 MG/2 ML
4 VIAL (ML) INJECTION EVERY 6 HOURS
Refills: 0 | Status: DISCONTINUED | OUTPATIENT
Start: 2025-02-20 | End: 2025-02-25

## 2025-02-20 RX ORDER — OXYCODONE HYDROCHLORIDE 30 MG/1
5 TABLET ORAL
Refills: 0 | Status: DISCONTINUED | OUTPATIENT
Start: 2025-02-20 | End: 2025-02-21

## 2025-02-20 RX ORDER — DEXTROSE 50 % IN WATER 50 %
25 SYRINGE (ML) INTRAVENOUS ONCE
Refills: 0 | Status: COMPLETED | OUTPATIENT
Start: 2025-02-20 | End: 2025-02-20

## 2025-02-20 RX ORDER — SODIUM CHLORIDE 9 G/1000ML
1000 INJECTION, SOLUTION INTRAVENOUS
Refills: 0 | Status: DISCONTINUED | OUTPATIENT
Start: 2025-02-20 | End: 2025-02-20

## 2025-02-20 RX ORDER — ACETAMINOPHEN 500 MG/5ML
650 LIQUID (ML) ORAL EVERY 6 HOURS
Refills: 0 | Status: DISCONTINUED | OUTPATIENT
Start: 2025-02-20 | End: 2025-02-21

## 2025-02-20 RX ORDER — BUPIVACAINE 13.3 MG/ML
20 INJECTION, SUSPENSION, LIPOSOMAL INFILTRATION ONCE
Refills: 0 | Status: DISCONTINUED | OUTPATIENT
Start: 2025-02-20 | End: 2025-02-21

## 2025-02-20 RX ORDER — HYDROMORPHONE/SOD CHLOR,ISO/PF 2 MG/10 ML
0.5 SYRINGE (ML) INJECTION ONCE
Refills: 0 | Status: DISCONTINUED | OUTPATIENT
Start: 2025-02-20 | End: 2025-02-21

## 2025-02-20 RX ADMIN — Medication 650 MILLIGRAM(S): at 07:01

## 2025-02-20 RX ADMIN — SODIUM CHLORIDE 75 MILLILITER(S): 9 INJECTION, SOLUTION INTRAVENOUS at 13:30

## 2025-02-20 RX ADMIN — Medication 1 APPLICATION(S): at 06:07

## 2025-02-20 RX ADMIN — Medication 90 MILLIGRAM(S): at 10:41

## 2025-02-20 RX ADMIN — TRAMADOL HYDROCHLORIDE 25 MILLIGRAM(S): 50 TABLET, FILM COATED ORAL at 07:01

## 2025-02-20 RX ADMIN — Medication 1 APPLICATION(S): at 15:33

## 2025-02-20 RX ADMIN — TRAMADOL HYDROCHLORIDE 25 MILLIGRAM(S): 50 TABLET, FILM COATED ORAL at 06:31

## 2025-02-20 RX ADMIN — LIDOCAINE HYDROCHLORIDE 2 PATCH: 20 JELLY TOPICAL at 06:08

## 2025-02-20 RX ADMIN — Medication 1000 MILLIGRAM(S): at 14:44

## 2025-02-20 RX ADMIN — LIDOCAINE HYDROCHLORIDE 2 PATCH: 20 JELLY TOPICAL at 07:45

## 2025-02-20 RX ADMIN — APREPITANT 40 MILLIGRAM(S): 40 CAPSULE ORAL at 14:44

## 2025-02-20 RX ADMIN — Medication 650 MILLIGRAM(S): at 06:31

## 2025-02-20 RX ADMIN — Medication 25 MILLILITER(S): at 13:29

## 2025-02-20 NOTE — PROGRESS NOTE ADULT - PROBLEM SELECTOR PLAN 6
Was seen last be Dr Delaney 12/24. A that time on gemcitabine with therapy, given on day 1 and day 8 of each 21-day cycle. Only receiving chemo intermittently due to missed appointments from hospital admission. Home: nifedipine 90 mg in AM  Plan:  c/w home meds

## 2025-02-20 NOTE — PROGRESS NOTE ADULT - PROBLEM SELECTOR PLAN 3
Home: nifedipine 90 mg in AM  Plan:  c/w home meds Was previously on eliquis, but taken off due to multiple falls. Had LE dopplers at last admission 1/2025 that were negative for DVT  Bilateral LE tender to palpation with 1+ edema bilaterally, has been sedentary for nearly a month at minimum  Wells: 3  D-Dimer 1183

## 2025-02-20 NOTE — PROGRESS NOTE ADULT - PROBLEM SELECTOR PLAN 5
baseline ~1.9-2  On admission 1.66, Cr in January at [facility] 1.55    Plan:  -ctm bmp for DUNIA
baseline ~1.9-2  On admission 1.66, Cr in January at [facility] 1.55    Plan:  -ctm bmp for DUNIA

## 2025-02-20 NOTE — BRIEF OPERATIVE NOTE - NSICDXBRIEFPROCEDURE_GEN_ALL_CORE_FT
PROCEDURES:  Minimally invasive revision of total replacement of right hip 20-Feb-2025 19:25:06  Landen Banegas

## 2025-02-20 NOTE — PROGRESS NOTE ADULT - PROBLEM SELECTOR PLAN 9
Home med atorvastatin 40mg qd.  Plan:   c/w home meds IVF: none  Diet: NPO for surgery   DVT: Holding for Surgery  Dispo: [facility]

## 2025-02-20 NOTE — BRIEF OPERATIVE NOTE - NSICDXBRIEFPREOP_GEN_ALL_CORE_FT
PRE-OP DIAGNOSIS:  Dislocation of prosthesis of right hip joint 20-Feb-2025 19:24:54  Ladnen Banegas

## 2025-02-20 NOTE — PROGRESS NOTE ADULT - PROBLEM SELECTOR PLAN 8
Was previously on eliquis, but taken off due to multiple falls. Had LE dopplers at last admission 1/2025 that were negative for DVT  Bilateral LE tender to palpation with 1+ edema bilaterally, has been sedentary for nearly a month at minimum  Wells: 3  D-Dimer 1183 Follows with Dr. Johnston. Per chart review, intermittently on 2mg decadron during flares.

## 2025-02-20 NOTE — PROGRESS NOTE ADULT - SUBJECTIVE AND OBJECTIVE BOX
INTERVAL HPI/OVERNIGHT EVENTS:  No chief complaint;  For OR today   Patient is cleared medically        MEDICATIONS  (STANDING):  atorvastatin 40 milliGRAM(s) Oral at bedtime  dextrose 5%. 1000 milliLiter(s) (50 mL/Hr) IV Continuous <Continuous>  dextrose 5%. 1000 milliLiter(s) (100 mL/Hr) IV Continuous <Continuous>  dextrose 50% Injectable 25 Gram(s) IV Push once  dextrose 50% Injectable 12.5 Gram(s) IV Push once  dextrose 50% Injectable 25 Gram(s) IV Push once  glucagon  Injectable 1 milliGRAM(s) IntraMuscular once  insulin glargine Injectable (LANTUS) 14 Unit(s) SubCutaneous at bedtime  insulin lispro (ADMELOG) corrective regimen sliding scale   SubCutaneous Before meals and at bedtime  lidocaine   4% Patch 2 Patch Transdermal every 24 hours  NIFEdipine XL 90 milliGRAM(s) Oral every 24 hours  polyethylene glycol 3350 17 Gram(s) Oral every 24 hours  povidone iodine 10% Nasal Swab 1 Application(s) Both Nostrils once  senna 2 Tablet(s) Oral at bedtime    MEDICATIONS  (PRN):  acetaminophen     Tablet .. 650 milliGRAM(s) Oral every 6 hours PRN Temp greater or equal to 38C (100.4F), Mild Pain (1 - 3)  dextrose Oral Gel 15 Gram(s) Oral once PRN Blood Glucose LESS THAN 70 milliGRAM(s)/deciliter  traMADol 25 milliGRAM(s) Oral every 6 hours PRN Moderate Pain (4 - 6)      Allergies    citrus (Urticaria)  strawberry (Pruritus)  Bactrim (Rash)    Intolerances        Vital Signs Last 24 Hrs  T(C): 36.8 (2025 05:23), Max: 37.3 (2025 22:43)  T(F): 98.3 (2025 05:23), Max: 99.1 (2025 22:43)  HR: 85 (2025 05:23) (72 - 89)  BP: 133/74 (2025 05:23) (130/76 - 163/83)  BP(mean): --  RR: 18 (2025 05:23) (18 - 18)  SpO2: 97% (2025 05:23) (94% - 97%)    Parameters below as of 2025 05:23  Patient On (Oxygen Delivery Method): room air              Constitutional:  Awake     Eyes: NEDRA    ENMT: Negative    Neck: Supple    Back:  no tenderness     Respiratory:  clear     Cardiovascular: S1 S2    Gastrointestinal:  soft     Genitourinary:    Extremities:  no edema     Vascular:    Neurological:    Skin:    Lymph Nodes:            LABS:                        10.7   6.74  )-----------( 224      ( 2025 05:30 )             35.8     02-19    143  |  108  |  38[H]  ----------------------------<  94  4.1   |  24  |  1.71[H]    Ca    9.5      2025 05:30  Phos  3.9     -  Mg     2.0           PT/INR - ( 2025 11:03 )   PT: 11.9 sec;   INR: 1.02          PTT - ( 2025 11:03 )  PTT:31.3 sec  Urinalysis Basic - ( 2025 05:00 )    Color: Yellow / Appearance: Clear / S.027 / pH: x  Gluc: x / Ketone: Negative mg/dL  / Bili: Negative / Urobili: 0.2 mg/dL   Blood: x / Protein: 300 mg/dL / Nitrite: Negative   Leuk Esterase: Negative / RBC: 1 /HPF / WBC 4 /HPF   Sq Epi: x / Non Sq Epi: 6 /HPF / Bacteria: Moderate /HPF        RADIOLOGY & ADDITIONAL TESTS:

## 2025-02-20 NOTE — PROGRESS NOTE ADULT - PROBLEM SELECTOR PLAN 7
Follows with Dr. Johnston. Per chart review, intermittently on 2mg decadron during flares. Home: nifedipine 90 mg in AM  Plan:  c/w home meds

## 2025-02-20 NOTE — BRIEF OPERATIVE NOTE - NSICDXBRIEFPOSTOP_GEN_ALL_CORE_FT
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

## 2025-02-20 NOTE — PROGRESS NOTE ADULT - PROBLEM SELECTOR PROBLEM 3
HLD (hyperlipidemia) History of deep venous thrombosis (DVT) of distal vein of right lower extremity

## 2025-02-20 NOTE — PROGRESS NOTE ADULT - SUBJECTIVE AND OBJECTIVE BOX
Ortho Post Op Check    Procedure: R rTHA  Surgeon: Dr. Almanzar    Pt comfortable without complaints, pain controlled  Denies CP, SOB, N/V, numbness/tingling     Vital Signs Last 24 Hrs  T(C): --  T(F): --  HR: 74 (02-20-25 @ 14:06) (74 - 74)  BP: 168/77 (02-20-25 @ 14:06) (168/77 - 168/77)  BP(mean): --  RR: --  SpO2: --  I&O's Summary      General: Pt Alert and oriented, NAD  DSG C/D/I - Preveena  Pulses: 2+ DP  Sensation: SILT  Motor: EHL/FHL/TA/GS firing                          10.5   7.54  )-----------( 231      ( 20 Feb 2025 11:12 )             34.2     02-20    142  |  104  |  39[H]  ----------------------------<  77  3.8   |  23  |  1.71[H]    Ca    9.6      20 Feb 2025 11:11  Phos  3.9     02-20  Mg     1.8     02-20        Post-op X-Ray: hardware intact    A/P: 74yFemale POD#0 s/p R rTHA  - Stable  - Pain Control  - DVT ppx: ASA  - Post op abx: Ancef  - PT, WBS: WBAT, PHP   Dispo: Pending PT eval    Ortho Pager [phone]

## 2025-02-20 NOTE — PROGRESS NOTE ADULT - PROBLEM SELECTOR PLAN 2
Surgery: Revision right total hip arthroplasty    - Pre-op labs (CBC, CMP, PT, PTT, INR, T/S,)  - EKG - NSR  - CXR - no acute cardiopulmonary disease process  - METS 1   - RCRI -1 point 6% risk of cardiac event  - Gloria score - 0.1% risk of MI or cardiac arrest, intraoperatively or up to 30 days post-op    Pt medically cleared for surgery. Was seen last be Dr Delaney 12/24. A that time on gemcitabine with therapy, given on day 1 and day 8 of each 21-day cycle. Only receiving chemo intermittently due to missed appointments from hospital admission.

## 2025-02-20 NOTE — PROGRESS NOTE ADULT - PROBLEM SELECTOR PLAN 1
BIBA nausea, vomiting, and diarrhea x 5 days. Chills.     Triage Assessment (Adult)       Row Name 03/02/24 1821          Triage Assessment    Airway WDL WDL        Respiratory WDL    Respiratory WDL WDL        Skin Circulation/Temperature WDL    Skin Circulation/Temperature WDL WDL        Cardiac WDL    Cardiac WDL WDL        Peripheral/Neurovascular WDL    Peripheral Neurovascular WDL WDL        Cognitive/Neuro/Behavioral WDL    Cognitive/Neuro/Behavioral WDL WDL                     
Ortho consulted in ED, s/p closed reduction confirmed by post-reduction XRs 1/2025. Returns due to inability to walk due to recurrent dislocations  CT: CT of the pelvis demonstrates a dislocation of the right hip arthroplasty with the femoral component dislocated superiorly in relation to the acetabular component. There is no acute displaced fracture.      -F/u ortho recs- Plan for OR 2/20  -RCRI: 1 point 6% risk of cardiac event, METS 1 (due to inability to ambulate)  -Pain control: tylenol 650mg q6 prn, tramadol 25 mg q6 prn
Ortho consulted in ED, s/p closed reduction confirmed by post-reduction XRs 1/2025. Returns due to inability to walk due to recurrent dislocations  CT: CT of the pelvis demonstrates a dislocation of the right hip arthroplasty with the femoral component dislocated superiorly in relation to the acetabular component. There is no acute displaced fracture.      -F/u ortho recs  -RCRI: 1 point 6% risk of cardiac event, METS 1 (due to inability to ambulate)  -Pain control: tylenol 650mg q6 prn, tramadol 25 mg q6 prn

## 2025-02-20 NOTE — PROGRESS NOTE ADULT - PROBLEM SELECTOR PLAN 10
IVF: none  N: CC  DVT: heparin  Dispo: [facility] IVF: none  Diet: NPO for surgery   DVT: Holding for Surgery  Dispo: [facility]

## 2025-02-20 NOTE — PROGRESS NOTE ADULT - SUBJECTIVE AND OBJECTIVE BOX
OVERNIGHT EVENTS: NAEO    SUBJECTIVE / INTERVAL HPI: Patient seen and examined at bedside this morning. Pt offers no c/o any sort. Denies chest pain, palpitations, sob, headache, or N/V/D.     Remaining ROS negative       PHYSICAL EXAM:  General:NAD, appears comfortable    HEENT:  PERRL, anicteric sclera  Cardiovascular:  RRR  Respiratory: CTA B/L  Gastrointestinal: soft, NT/ND; +BSx4  Extremities: no edema to LE  Vascular: 2+ radial pulses  Neurological: AAOx3; no focal deficits    VITAL SIGNS:  Vital Signs Last 24 Hrs  T(C): 36.8 (20 Feb 2025 05:23), Max: 37.3 (19 Feb 2025 22:43)  T(F): 98.3 (20 Feb 2025 05:23), Max: 99.1 (19 Feb 2025 22:43)  HR: 85 (20 Feb 2025 05:23) (72 - 92)  BP: 133/74 (20 Feb 2025 05:23) (130/76 - 177/81)  BP(mean): --  RR: 18 (20 Feb 2025 05:23) (18 - 18)  SpO2: 97% (20 Feb 2025 05:23) (94% - 99%)    Parameters below as of 20 Feb 2025 05:23  Patient On (Oxygen Delivery Method): room air    MEDICATIONS:  MEDICATIONS  (STANDING):  atorvastatin 40 milliGRAM(s) Oral at bedtime  dextrose 5%. 1000 milliLiter(s) (50 mL/Hr) IV Continuous <Continuous>  dextrose 5%. 1000 milliLiter(s) (100 mL/Hr) IV Continuous <Continuous>  dextrose 50% Injectable 25 Gram(s) IV Push once  dextrose 50% Injectable 12.5 Gram(s) IV Push once  dextrose 50% Injectable 25 Gram(s) IV Push once  glucagon  Injectable 1 milliGRAM(s) IntraMuscular once  insulin glargine Injectable (LANTUS) 14 Unit(s) SubCutaneous at bedtime  insulin lispro (ADMELOG) corrective regimen sliding scale   SubCutaneous Before meals and at bedtime  lidocaine   4% Patch 2 Patch Transdermal every 24 hours  NIFEdipine XL 90 milliGRAM(s) Oral every 24 hours  polyethylene glycol 3350 17 Gram(s) Oral every 24 hours  povidone iodine 10% Nasal Swab 1 Application(s) Both Nostrils once  senna 2 Tablet(s) Oral at bedtime    MEDICATIONS  (PRN):  acetaminophen     Tablet .. 650 milliGRAM(s) Oral every 6 hours PRN Temp greater or equal to 38C (100.4F), Mild Pain (1 - 3)  dextrose Oral Gel 15 Gram(s) Oral once PRN Blood Glucose LESS THAN 70 milliGRAM(s)/deciliter  traMADol 25 milliGRAM(s) Oral every 6 hours PRN Moderate Pain (4 - 6)      ALLERGIES:  Allergies    citrus (Urticaria)  strawberry (Pruritus)  Bactrim (Rash)    Intolerances        LABS:                        10.7   6.74  )-----------( 224      ( 19 Feb 2025 05:30 )             35.8     02-19    143  |  108  |  38[H]  ----------------------------<  94  4.1   |  24  |  1.71[H]    Ca    9.5      19 Feb 2025 05:30  Phos  3.9     02-19  Mg     2.0     02-19      PT/INR - ( 18 Feb 2025 11:03 )   PT: 11.9 sec;   INR: 1.02          PTT - ( 18 Feb 2025 11:03 )  PTT:31.3 sec  Urinalysis Basic - ( 19 Feb 2025 05:30 )    Color: x / Appearance: x / SG: x / pH: x  Gluc: 94 mg/dL / Ketone: x  / Bili: x / Urobili: x   Blood: x / Protein: x / Nitrite: x   Leuk Esterase: x / RBC: x / WBC x   Sq Epi: x / Non Sq Epi: x / Bacteria: x      CAPILLARY BLOOD GLUCOSE      POCT Blood Glucose.: 187 mg/dL (19 Feb 2025 22:13)      RADIOLOGY & ADDITIONAL TESTS: Reviewed. _________________________TRANSFER 4URIS to SICU_________________________    Hospital Course:   74 year old female with history of  lung cancer (w/ mets to brain), CKD, HLD, DM2, RA (follows with Dr. Johnston), RLE DVT (off Eliquis d/t frequent falls), R hip replacement with multiple recent dislocations (1/13/25 with closed reduction done at that time), sent by Dr. Almanzar for admission for R hip surgery and medical optimization. Patient underwent surgery on 2/20 and while in PACU, had potential seizure activity and a rapid response was called. Patient was lethargic, responded to pain, and intermittently responded to voice, vital signs were stable. She has a history brain metastasis and the decision was made for keppra load and transfer to SICU.    OVERNIGHT EVENTS: NAEO    SUBJECTIVE / INTERVAL HPI: Patient seen and examined at bedside this morning. Pt offers no c/o any sort. Denies chest pain, palpitations, sob, headache, or N/V/D.     Remaining ROS negative       PHYSICAL EXAM:  General:NAD, appears comfortable    HEENT:  PERRL, anicteric sclera  Cardiovascular:  RRR  Respiratory: CTA B/L  Gastrointestinal: soft, NT/ND; +BSx4  Extremities: no edema to LE  Vascular: 2+ radial pulses  Neurological: AAOx3; no focal deficits    VITAL SIGNS:  Vital Signs Last 24 Hrs  T(C): 36.8 (20 Feb 2025 05:23), Max: 37.3 (19 Feb 2025 22:43)  T(F): 98.3 (20 Feb 2025 05:23), Max: 99.1 (19 Feb 2025 22:43)  HR: 85 (20 Feb 2025 05:23) (72 - 92)  BP: 133/74 (20 Feb 2025 05:23) (130/76 - 177/81)  BP(mean): --  RR: 18 (20 Feb 2025 05:23) (18 - 18)  SpO2: 97% (20 Feb 2025 05:23) (94% - 99%)    Parameters below as of 20 Feb 2025 05:23  Patient On (Oxygen Delivery Method): room air    MEDICATIONS:  MEDICATIONS  (STANDING):  atorvastatin 40 milliGRAM(s) Oral at bedtime  dextrose 5%. 1000 milliLiter(s) (50 mL/Hr) IV Continuous <Continuous>  dextrose 5%. 1000 milliLiter(s) (100 mL/Hr) IV Continuous <Continuous>  dextrose 50% Injectable 25 Gram(s) IV Push once  dextrose 50% Injectable 12.5 Gram(s) IV Push once  dextrose 50% Injectable 25 Gram(s) IV Push once  glucagon  Injectable 1 milliGRAM(s) IntraMuscular once  insulin glargine Injectable (LANTUS) 14 Unit(s) SubCutaneous at bedtime  insulin lispro (ADMELOG) corrective regimen sliding scale   SubCutaneous Before meals and at bedtime  lidocaine   4% Patch 2 Patch Transdermal every 24 hours  NIFEdipine XL 90 milliGRAM(s) Oral every 24 hours  polyethylene glycol 3350 17 Gram(s) Oral every 24 hours  povidone iodine 10% Nasal Swab 1 Application(s) Both Nostrils once  senna 2 Tablet(s) Oral at bedtime    MEDICATIONS  (PRN):  acetaminophen     Tablet .. 650 milliGRAM(s) Oral every 6 hours PRN Temp greater or equal to 38C (100.4F), Mild Pain (1 - 3)  dextrose Oral Gel 15 Gram(s) Oral once PRN Blood Glucose LESS THAN 70 milliGRAM(s)/deciliter  traMADol 25 milliGRAM(s) Oral every 6 hours PRN Moderate Pain (4 - 6)      ALLERGIES:  Allergies    citrus (Urticaria)  strawberry (Pruritus)  Bactrim (Rash)    Intolerances        LABS:                        10.7   6.74  )-----------( 224      ( 19 Feb 2025 05:30 )             35.8     02-19    143  |  108  |  38[H]  ----------------------------<  94  4.1   |  24  |  1.71[H]    Ca    9.5      19 Feb 2025 05:30  Phos  3.9     02-19  Mg     2.0     02-19      PT/INR - ( 18 Feb 2025 11:03 )   PT: 11.9 sec;   INR: 1.02          PTT - ( 18 Feb 2025 11:03 )  PTT:31.3 sec  Urinalysis Basic - ( 19 Feb 2025 05:30 )    Color: x / Appearance: x / SG: x / pH: x  Gluc: 94 mg/dL / Ketone: x  / Bili: x / Urobili: x   Blood: x / Protein: x / Nitrite: x   Leuk Esterase: x / RBC: x / WBC x   Sq Epi: x / Non Sq Epi: x / Bacteria: x      CAPILLARY BLOOD GLUCOSE      POCT Blood Glucose.: 187 mg/dL (19 Feb 2025 22:13)      RADIOLOGY & ADDITIONAL TESTS: Reviewed. OVERNIGHT EVENTS: NAEO    SUBJECTIVE / INTERVAL HPI: Patient seen and examined at bedside this morning. Pt offers no c/o any sort. Denies chest pain, palpitations, sob, headache, or N/V/D.     Remaining ROS negative       PHYSICAL EXAM:  General:NAD, appears comfortable    HEENT:  PERRL, anicteric sclera  Cardiovascular:  RRR  Respiratory: CTA B/L  Gastrointestinal: soft, NT/ND; +BSx4  Extremities: no edema to LE  Vascular: 2+ radial pulses  Neurological: AAOx3; no focal deficits    VITAL SIGNS:  Vital Signs Last 24 Hrs  T(C): 36.8 (20 Feb 2025 05:23), Max: 37.3 (19 Feb 2025 22:43)  T(F): 98.3 (20 Feb 2025 05:23), Max: 99.1 (19 Feb 2025 22:43)  HR: 85 (20 Feb 2025 05:23) (72 - 92)  BP: 133/74 (20 Feb 2025 05:23) (130/76 - 177/81)  BP(mean): --  RR: 18 (20 Feb 2025 05:23) (18 - 18)  SpO2: 97% (20 Feb 2025 05:23) (94% - 99%)    Parameters below as of 20 Feb 2025 05:23  Patient On (Oxygen Delivery Method): room air    MEDICATIONS:  MEDICATIONS  (STANDING):  atorvastatin 40 milliGRAM(s) Oral at bedtime  dextrose 5%. 1000 milliLiter(s) (50 mL/Hr) IV Continuous <Continuous>  dextrose 5%. 1000 milliLiter(s) (100 mL/Hr) IV Continuous <Continuous>  dextrose 50% Injectable 25 Gram(s) IV Push once  dextrose 50% Injectable 12.5 Gram(s) IV Push once  dextrose 50% Injectable 25 Gram(s) IV Push once  glucagon  Injectable 1 milliGRAM(s) IntraMuscular once  insulin glargine Injectable (LANTUS) 14 Unit(s) SubCutaneous at bedtime  insulin lispro (ADMELOG) corrective regimen sliding scale   SubCutaneous Before meals and at bedtime  lidocaine   4% Patch 2 Patch Transdermal every 24 hours  NIFEdipine XL 90 milliGRAM(s) Oral every 24 hours  polyethylene glycol 3350 17 Gram(s) Oral every 24 hours  povidone iodine 10% Nasal Swab 1 Application(s) Both Nostrils once  senna 2 Tablet(s) Oral at bedtime    MEDICATIONS  (PRN):  acetaminophen     Tablet .. 650 milliGRAM(s) Oral every 6 hours PRN Temp greater or equal to 38C (100.4F), Mild Pain (1 - 3)  dextrose Oral Gel 15 Gram(s) Oral once PRN Blood Glucose LESS THAN 70 milliGRAM(s)/deciliter  traMADol 25 milliGRAM(s) Oral every 6 hours PRN Moderate Pain (4 - 6)      ALLERGIES:  Allergies    citrus (Urticaria)  strawberry (Pruritus)  Bactrim (Rash)    Intolerances        LABS:                        10.7   6.74  )-----------( 224      ( 19 Feb 2025 05:30 )             35.8     02-19    143  |  108  |  38[H]  ----------------------------<  94  4.1   |  24  |  1.71[H]    Ca    9.5      19 Feb 2025 05:30  Phos  3.9     02-19  Mg     2.0     02-19      PT/INR - ( 18 Feb 2025 11:03 )   PT: 11.9 sec;   INR: 1.02          PTT - ( 18 Feb 2025 11:03 )  PTT:31.3 sec  Urinalysis Basic - ( 19 Feb 2025 05:30 )    Color: x / Appearance: x / SG: x / pH: x  Gluc: 94 mg/dL / Ketone: x  / Bili: x / Urobili: x   Blood: x / Protein: x / Nitrite: x   Leuk Esterase: x / RBC: x / WBC x   Sq Epi: x / Non Sq Epi: x / Bacteria: x      CAPILLARY BLOOD GLUCOSE      POCT Blood Glucose.: 187 mg/dL (19 Feb 2025 22:13)      RADIOLOGY & ADDITIONAL TESTS: Reviewed.

## 2025-02-20 NOTE — PROGRESS NOTE ADULT - PROBLEM SELECTOR PLAN 4
1/2025 A1c 9  Home: glargine 14 u bedtime, lispro 10 u premeals  Plan:  -EMI iso CKD  -CC diet  -hold pre-meal insulin and resume as tolerated
1/2025 A1c 9  Home: glargine 14 u bedtime, lispro 10 u premeals  Plan:  -EMI iso CKD  -CC diet  -hold pre-meal insulin and resume as tolerated

## 2025-02-21 DIAGNOSIS — R56.9 UNSPECIFIED CONVULSIONS: ICD-10-CM

## 2025-02-21 LAB
ANION GAP SERPL CALC-SCNC: 17 MMOL/L — SIGNIFICANT CHANGE UP (ref 5–17)
APPEARANCE UR: CLEAR — SIGNIFICANT CHANGE UP
APTT BLD: 27.4 SEC — SIGNIFICANT CHANGE UP (ref 24.5–35.6)
APTT BLD: 28.9 SEC — SIGNIFICANT CHANGE UP (ref 24.5–35.6)
BASOPHILS # BLD AUTO: 0.02 K/UL — SIGNIFICANT CHANGE UP (ref 0–0.2)
BASOPHILS NFR BLD AUTO: 0.1 % — SIGNIFICANT CHANGE UP (ref 0–2)
BILIRUB UR-MCNC: NEGATIVE — SIGNIFICANT CHANGE UP
BLD GP AB SCN SERPL QL: NEGATIVE — SIGNIFICANT CHANGE UP
BUN SERPL-MCNC: 47 MG/DL — HIGH (ref 7–23)
CALCIUM SERPL-MCNC: 8.6 MG/DL — SIGNIFICANT CHANGE UP (ref 8.4–10.5)
CHLORIDE SERPL-SCNC: 107 MMOL/L — SIGNIFICANT CHANGE UP (ref 96–108)
CO2 SERPL-SCNC: 18 MMOL/L — LOW (ref 22–31)
COLOR SPEC: YELLOW — SIGNIFICANT CHANGE UP
CREAT ?TM UR-MCNC: 110 MG/DL — SIGNIFICANT CHANGE UP
CREAT SERPL-MCNC: 1.8 MG/DL — HIGH (ref 0.5–1.3)
DIFF PNL FLD: NEGATIVE — SIGNIFICANT CHANGE UP
EGFR: 29 ML/MIN/1.73M2 — LOW
EOSINOPHIL # BLD AUTO: 0 K/UL — SIGNIFICANT CHANGE UP (ref 0–0.5)
EOSINOPHIL NFR BLD AUTO: 0 % — SIGNIFICANT CHANGE UP (ref 0–6)
GAS PNL BLDA: SIGNIFICANT CHANGE UP
GLUCOSE SERPL-MCNC: 246 MG/DL — HIGH (ref 70–99)
GLUCOSE UR QL: NEGATIVE MG/DL — SIGNIFICANT CHANGE UP
HCT VFR BLD CALC: 26.7 % — LOW (ref 34.5–45)
HCT VFR BLD CALC: 30.4 % — LOW (ref 34.5–45)
HGB BLD-MCNC: 8.5 G/DL — LOW (ref 11.5–15.5)
HGB BLD-MCNC: 9.2 G/DL — LOW (ref 11.5–15.5)
IMM GRANULOCYTES NFR BLD AUTO: 0.8 % — SIGNIFICANT CHANGE UP (ref 0–0.9)
INR BLD: 1.04 — SIGNIFICANT CHANGE UP (ref 0.85–1.16)
INR BLD: 1.08 — SIGNIFICANT CHANGE UP (ref 0.85–1.16)
KETONES UR-MCNC: ABNORMAL MG/DL
LACTATE SERPL-SCNC: 1.9 MMOL/L — SIGNIFICANT CHANGE UP (ref 0.5–2)
LACTATE SERPL-SCNC: 10 MMOL/L — CRITICAL HIGH (ref 0.5–2)
LACTATE SERPL-SCNC: 2.1 MMOL/L — HIGH (ref 0.5–2)
LACTATE SERPL-SCNC: 5.6 MMOL/L — CRITICAL HIGH (ref 0.5–2)
LEUKOCYTE ESTERASE UR-ACNC: ABNORMAL
LYMPHOCYTES # BLD AUTO: 1.83 K/UL — SIGNIFICANT CHANGE UP (ref 1–3.3)
LYMPHOCYTES # BLD AUTO: 12.4 % — LOW (ref 13–44)
MAGNESIUM SERPL-MCNC: 1.6 MG/DL — SIGNIFICANT CHANGE UP (ref 1.6–2.6)
MAGNESIUM SERPL-MCNC: 1.7 MG/DL — SIGNIFICANT CHANGE UP (ref 1.6–2.6)
MCHC RBC-ENTMCNC: 27.1 PG — SIGNIFICANT CHANGE UP (ref 27–34)
MCHC RBC-ENTMCNC: 27.7 PG — SIGNIFICANT CHANGE UP (ref 27–34)
MCHC RBC-ENTMCNC: 30.3 G/DL — LOW (ref 32–36)
MCHC RBC-ENTMCNC: 31.8 G/DL — LOW (ref 32–36)
MCV RBC AUTO: 87 FL — SIGNIFICANT CHANGE UP (ref 80–100)
MCV RBC AUTO: 89.4 FL — SIGNIFICANT CHANGE UP (ref 80–100)
MONOCYTES # BLD AUTO: 0.46 K/UL — SIGNIFICANT CHANGE UP (ref 0–0.9)
MONOCYTES NFR BLD AUTO: 3.1 % — SIGNIFICANT CHANGE UP (ref 2–14)
NEUTROPHILS # BLD AUTO: 12.29 K/UL — HIGH (ref 1.8–7.4)
NEUTROPHILS NFR BLD AUTO: 83.6 % — HIGH (ref 43–77)
NITRITE UR-MCNC: NEGATIVE — SIGNIFICANT CHANGE UP
NRBC BLD AUTO-RTO: 0 /100 WBCS — SIGNIFICANT CHANGE UP (ref 0–0)
NRBC BLD AUTO-RTO: 0 /100 WBCS — SIGNIFICANT CHANGE UP (ref 0–0)
OSMOLALITY UR: 481 MOSM/KG — SIGNIFICANT CHANGE UP (ref 300–900)
PH UR: 5 — SIGNIFICANT CHANGE UP (ref 5–8)
PHOSPHATE SERPL-MCNC: 5.3 MG/DL — HIGH (ref 2.5–4.5)
PHOSPHATE SERPL-MCNC: 5.6 MG/DL — HIGH (ref 2.5–4.5)
PLATELET # BLD AUTO: 183 K/UL — SIGNIFICANT CHANGE UP (ref 150–400)
PLATELET # BLD AUTO: 210 K/UL — SIGNIFICANT CHANGE UP (ref 150–400)
POTASSIUM SERPL-MCNC: 4.4 MMOL/L — SIGNIFICANT CHANGE UP (ref 3.5–5.3)
POTASSIUM SERPL-SCNC: 4.4 MMOL/L — SIGNIFICANT CHANGE UP (ref 3.5–5.3)
POTASSIUM UR-SCNC: 68 MMOL/L — SIGNIFICANT CHANGE UP
PROT ?TM UR-MCNC: 176 MG/DL — HIGH (ref 0–12)
PROT UR-MCNC: 300 MG/DL
PROT/CREAT UR-RTO: 1.6 RATIO — HIGH (ref 0–0.2)
PROTHROM AB SERPL-ACNC: 12.2 SEC — SIGNIFICANT CHANGE UP (ref 9.9–13.4)
PROTHROM AB SERPL-ACNC: 12.6 SEC — SIGNIFICANT CHANGE UP (ref 9.9–13.4)
RBC # BLD: 3.07 M/UL — LOW (ref 3.8–5.2)
RBC # BLD: 3.4 M/UL — LOW (ref 3.8–5.2)
RBC # FLD: 16.1 % — HIGH (ref 10.3–14.5)
RBC # FLD: 16.4 % — HIGH (ref 10.3–14.5)
RH IG SCN BLD-IMP: POSITIVE — SIGNIFICANT CHANGE UP
SODIUM SERPL-SCNC: 142 MMOL/L — SIGNIFICANT CHANGE UP (ref 135–145)
SODIUM UR-SCNC: 37 MMOL/L — SIGNIFICANT CHANGE UP
SP GR SPEC: 1.02 — SIGNIFICANT CHANGE UP (ref 1–1.03)
UROBILINOGEN FLD QL: 0.2 MG/DL — SIGNIFICANT CHANGE UP (ref 0.2–1)
UUN UR-MCNC: 666 MG/DL — SIGNIFICANT CHANGE UP
WBC # BLD: 14.72 K/UL — HIGH (ref 3.8–10.5)
WBC # BLD: 16.87 K/UL — HIGH (ref 3.8–10.5)
WBC # FLD AUTO: 14.72 K/UL — HIGH (ref 3.8–10.5)
WBC # FLD AUTO: 16.87 K/UL — HIGH (ref 3.8–10.5)

## 2025-02-21 PROCEDURE — 70450 CT HEAD/BRAIN W/O DYE: CPT | Mod: 26,77

## 2025-02-21 PROCEDURE — 71045 X-RAY EXAM CHEST 1 VIEW: CPT | Mod: 26

## 2025-02-21 PROCEDURE — 72170 X-RAY EXAM OF PELVIS: CPT | Mod: 26

## 2025-02-21 PROCEDURE — 93010 ELECTROCARDIOGRAM REPORT: CPT

## 2025-02-21 PROCEDURE — 70450 CT HEAD/BRAIN W/O DYE: CPT | Mod: 26

## 2025-02-21 PROCEDURE — 99232 SBSQ HOSP IP/OBS MODERATE 35: CPT

## 2025-02-21 PROCEDURE — 99222 1ST HOSP IP/OBS MODERATE 55: CPT | Mod: GC

## 2025-02-21 RX ORDER — ACETAMINOPHEN 500 MG/5ML
1000 LIQUID (ML) ORAL EVERY 6 HOURS
Refills: 0 | Status: COMPLETED | OUTPATIENT
Start: 2025-02-21 | End: 2025-02-22

## 2025-02-21 RX ORDER — DEXTROSE 50 % IN WATER 50 %
25 SYRINGE (ML) INTRAVENOUS ONCE
Refills: 0 | Status: DISCONTINUED | OUTPATIENT
Start: 2025-02-21 | End: 2025-02-25

## 2025-02-21 RX ORDER — LEVETIRACETAM 10 MG/ML
1000 INJECTION, SOLUTION INTRAVENOUS EVERY 12 HOURS
Refills: 0 | Status: DISCONTINUED | OUTPATIENT
Start: 2025-02-21 | End: 2025-02-21

## 2025-02-21 RX ORDER — SODIUM CHLORIDE 9 G/1000ML
1000 INJECTION, SOLUTION INTRAVENOUS
Refills: 0 | Status: DISCONTINUED | OUTPATIENT
Start: 2025-02-21 | End: 2025-02-21

## 2025-02-21 RX ORDER — MAGNESIUM SULFATE 500 MG/ML
1 SYRINGE (ML) INJECTION ONCE
Refills: 0 | Status: COMPLETED | OUTPATIENT
Start: 2025-02-21 | End: 2025-02-21

## 2025-02-21 RX ORDER — DEXTROSE 50 % IN WATER 50 %
12.5 SYRINGE (ML) INTRAVENOUS ONCE
Refills: 0 | Status: DISCONTINUED | OUTPATIENT
Start: 2025-02-21 | End: 2025-02-21

## 2025-02-21 RX ORDER — HYDROMORPHONE/SOD CHLOR,ISO/PF 2 MG/10 ML
0.25 SYRINGE (ML) INJECTION ONCE
Refills: 0 | Status: DISCONTINUED | OUTPATIENT
Start: 2025-02-21 | End: 2025-02-21

## 2025-02-21 RX ORDER — GLUCAGON 3 MG/1
1 POWDER NASAL ONCE
Refills: 0 | Status: DISCONTINUED | OUTPATIENT
Start: 2025-02-21 | End: 2025-02-25

## 2025-02-21 RX ORDER — INSULIN LISPRO 100 U/ML
INJECTION, SOLUTION INTRAVENOUS; SUBCUTANEOUS
Refills: 0 | Status: DISCONTINUED | OUTPATIENT
Start: 2025-02-21 | End: 2025-02-21

## 2025-02-21 RX ORDER — HYDROMORPHONE/SOD CHLOR,ISO/PF 2 MG/10 ML
0.25 SYRINGE (ML) INJECTION
Refills: 0 | Status: DISCONTINUED | OUTPATIENT
Start: 2025-02-21 | End: 2025-02-24

## 2025-02-21 RX ORDER — LORAZEPAM 4 MG/ML
4 VIAL (ML) INJECTION ONCE
Refills: 0 | Status: DISCONTINUED | OUTPATIENT
Start: 2025-02-21 | End: 2025-02-21

## 2025-02-21 RX ORDER — LORAZEPAM 4 MG/ML
0.5 VIAL (ML) INJECTION ONCE
Refills: 0 | Status: DISCONTINUED | OUTPATIENT
Start: 2025-02-21 | End: 2025-02-21

## 2025-02-21 RX ORDER — DEXTROSE 50 % IN WATER 50 %
15 SYRINGE (ML) INTRAVENOUS ONCE
Refills: 0 | Status: DISCONTINUED | OUTPATIENT
Start: 2025-02-21 | End: 2025-02-21

## 2025-02-21 RX ORDER — HYDROMORPHONE/SOD CHLOR,ISO/PF 2 MG/10 ML
0.2 SYRINGE (ML) INJECTION EVERY 4 HOURS
Refills: 0 | Status: DISCONTINUED | OUTPATIENT
Start: 2025-02-21 | End: 2025-02-21

## 2025-02-21 RX ORDER — DEXTROSE 50 % IN WATER 50 %
50 SYRINGE (ML) INTRAVENOUS ONCE
Refills: 0 | Status: DISCONTINUED | OUTPATIENT
Start: 2025-02-21 | End: 2025-02-21

## 2025-02-21 RX ORDER — ASPIRIN 325 MG
300 TABLET ORAL DAILY
Refills: 0 | Status: DISCONTINUED | OUTPATIENT
Start: 2025-02-21 | End: 2025-02-23

## 2025-02-21 RX ORDER — LEVETIRACETAM 10 MG/ML
750 INJECTION, SOLUTION INTRAVENOUS EVERY 12 HOURS
Refills: 0 | Status: DISCONTINUED | OUTPATIENT
Start: 2025-02-21 | End: 2025-02-22

## 2025-02-21 RX ORDER — LEVETIRACETAM 10 MG/ML
750 INJECTION, SOLUTION INTRAVENOUS EVERY 12 HOURS
Refills: 0 | Status: DISCONTINUED | OUTPATIENT
Start: 2025-02-21 | End: 2025-02-21

## 2025-02-21 RX ORDER — LEVETIRACETAM 10 MG/ML
2000 INJECTION, SOLUTION INTRAVENOUS ONCE
Refills: 0 | Status: COMPLETED | OUTPATIENT
Start: 2025-02-21 | End: 2025-02-21

## 2025-02-21 RX ADMIN — Medication 0.25 MILLIGRAM(S): at 19:12

## 2025-02-21 RX ADMIN — Medication 1000 MILLIGRAM(S): at 10:01

## 2025-02-21 RX ADMIN — INSULIN LISPRO 1: 100 INJECTION, SOLUTION INTRAVENOUS; SUBCUTANEOUS at 17:18

## 2025-02-21 RX ADMIN — Medication 1000 MILLIGRAM(S): at 21:46

## 2025-02-21 RX ADMIN — Medication 650 MILLIGRAM(S): at 00:45

## 2025-02-21 RX ADMIN — Medication 400 MILLIGRAM(S): at 21:27

## 2025-02-21 RX ADMIN — Medication 0.5 MILLIGRAM(S): at 22:42

## 2025-02-21 RX ADMIN — SODIUM CHLORIDE 100 MILLILITER(S): 9 INJECTION, SOLUTION INTRAVENOUS at 06:58

## 2025-02-21 RX ADMIN — Medication 0.25 MILLIGRAM(S): at 11:49

## 2025-02-21 RX ADMIN — Medication 0.2 MILLIGRAM(S): at 14:11

## 2025-02-21 RX ADMIN — Medication 0.25 MILLIGRAM(S): at 16:21

## 2025-02-21 RX ADMIN — LEVETIRACETAM 600 MILLIGRAM(S): 10 INJECTION, SOLUTION INTRAVENOUS at 04:20

## 2025-02-21 RX ADMIN — Medication 100 MILLIGRAM(S): at 04:15

## 2025-02-21 RX ADMIN — Medication 100 GRAM(S): at 07:02

## 2025-02-21 RX ADMIN — Medication 400 MILLIGRAM(S): at 09:02

## 2025-02-21 RX ADMIN — Medication 0.25 MILLIGRAM(S): at 19:26

## 2025-02-21 RX ADMIN — Medication 0.2 MILLIGRAM(S): at 15:50

## 2025-02-21 RX ADMIN — LEVETIRACETAM 750 MILLIGRAM(S): 10 INJECTION, SOLUTION INTRAVENOUS at 16:13

## 2025-02-21 RX ADMIN — INSULIN LISPRO 1: 100 INJECTION, SOLUTION INTRAVENOUS; SUBCUTANEOUS at 12:32

## 2025-02-21 RX ADMIN — Medication 1 APPLICATION(S): at 07:05

## 2025-02-21 RX ADMIN — Medication 300 MILLIGRAM(S): at 12:29

## 2025-02-21 RX ADMIN — Medication 1000 MILLIGRAM(S): at 15:55

## 2025-02-21 RX ADMIN — Medication 0.25 MILLIGRAM(S): at 15:58

## 2025-02-21 RX ADMIN — Medication 400 MILLIGRAM(S): at 15:42

## 2025-02-21 RX ADMIN — SODIUM CHLORIDE 100 MILLILITER(S): 9 INJECTION, SOLUTION INTRAVENOUS at 16:15

## 2025-02-21 RX ADMIN — Medication 100 GRAM(S): at 04:21

## 2025-02-21 RX ADMIN — INSULIN LISPRO 2: 100 INJECTION, SOLUTION INTRAVENOUS; SUBCUTANEOUS at 06:59

## 2025-02-21 RX ADMIN — Medication 0.25 MILLIGRAM(S): at 10:39

## 2025-02-21 RX ADMIN — Medication 100 MILLIGRAM(S): at 10:38

## 2025-02-21 RX ADMIN — Medication 650 MILLIGRAM(S): at 00:12

## 2025-02-21 NOTE — DIETITIAN INITIAL EVALUATION ADULT - PERTINENT LABORATORY DATA
02-21    142  |  107  |  47[H]  ----------------------------<  246[H]  4.4   |  18[L]  |  1.80[H]    Ca    8.6      21 Feb 2025 05:23  Phos  5.3     02-21  Mg     1.6     02-21    POCT Blood Glucose.: 189 mg/dL (02-21-25 @ 12:31)  A1C with Estimated Average Glucose Result: 6.8 % (02-18-25 @ 11:03)  A1C with Estimated Average Glucose Result: 9.0 % (01-14-25 @ 05:30)  A1C with Estimated Average Glucose Result: 10.5 % (11-13-24 @ 17:20)

## 2025-02-21 NOTE — PROGRESS NOTE ADULT - SUBJECTIVE AND OBJECTIVE BOX
INTERVAL HPI/OVERNIGHT EVENTS:  Events noted   This evening patient markedly improved;  Now following commands;  She has never been on antiseizure meds;   Daughter at bedside          MEDICATIONS  (STANDING):  acetaminophen   IVPB .. 1000 milliGRAM(s) IV Intermittent every 6 hours  aspirin Suppository 300 milliGRAM(s) Rectal daily  chlorhexidine 2% Cloths 1 Application(s) Topical <User Schedule>  dextrose 5%. 1000 milliLiter(s) (100 mL/Hr) IV Continuous <Continuous>  dextrose 50% Injectable 25 Gram(s) IV Push once  dextrose 50% Injectable 25 Gram(s) IV Push once  glucagon  Injectable 1 milliGRAM(s) IntraMuscular once  glucagon  Injectable 1 milliGRAM(s) IntraMuscular once  insulin lispro (ADMELOG) corrective regimen sliding scale   SubCutaneous every 6 hours  lactated ringers. 1000 milliLiter(s) (100 mL/Hr) IV Continuous <Continuous>  levETIRAcetam  IVPB 750 milliGRAM(s) IV Intermittent every 12 hours    MEDICATIONS  (PRN):  hydrALAZINE Injectable 10 milliGRAM(s) IV Push every 6 hours PRN SBP >160  HYDROmorphone  Injectable 0.25 milliGRAM(s) IV Push every 3 hours PRN Severe Pain (7 - 10)  ondansetron Injectable 4 milliGRAM(s) IV Push every 6 hours PRN Nausea and/or Vomiting      Allergies    citrus (Urticaria)  strawberry (Pruritus)  Bactrim (Rash)    Intolerances        Vital Signs Last 24 Hrs  T(C): 36.1 (2025 17:06), Max: 36.8 (2025 01:55)  T(F): 97 (2025 17:06), Max: 98.2 (2025 01:55)  HR: 62 (2025 18:00) (56 - 88)  BP: 146/73 (2025 18:00) (99/54 - 195/76)  BP(mean): 101 (2025 18:00) (72 - 116)  RR: 16 (2025 18:00) (8 - 31)  SpO2: 99% (2025 18:00) (93% - 100%)    Parameters below as of 2025 18:00  Patient On (Oxygen Delivery Method): room air              Constitutional: Awake and now alert     Eyes: NEDRA    ENMT: Negative    Neck: Supple    Back:  no tenderness     Respiratory:  clear     Cardiovascular: S1 S2    Gastrointestinal:  soft     Genitourinary:    Extremities:  no edema     Vascular:    Neurological:    Skin:    Lymph Nodes:             @ 07:01  -   @ 07:00  --------------------------------------------------------  IN: 1050 mL / OUT: 200 mL / NET: 850 mL     @ 07:01   @ 18:21  --------------------------------------------------------  IN: 1285 mL / OUT: 410 mL / NET: 875 mL      LABS:                        8.5    16.87 )-----------( 183      ( 2025 05:23 )             26.7     02-    142  |  107  |  47[H]  ----------------------------<  246[H]  4.4   |  18[L]  |  1.80[H]    Ca    8.6      2025 05:23  Phos  5.3       Mg     1.6           PT/INR - ( 2025 05:23 )   PT: 12.6 sec;   INR: 1.08          PTT - ( 2025 05:23 )  PTT:27.4 sec  Urinalysis Basic - ( 2025 07:44 )    Color: Yellow / Appearance: Clear / S.022 / pH: x  Gluc: x / Ketone: Trace mg/dL  / Bili: Negative / Urobili: 0.2 mg/dL   Blood: x / Protein: 300 mg/dL / Nitrite: Negative   Leuk Esterase: Trace / RBC: 1 /HPF / WBC 4 /HPF   Sq Epi: x / Non Sq Epi: 4 /HPF / Bacteria: Negative /HPF        RADIOLOGY & ADDITIONAL TESTS:

## 2025-02-21 NOTE — DIETITIAN INITIAL EVALUATION ADULT - ADD RECOMMEND
-Initiate nutrition within 24-48 hrs    *Recommend regular diet with appropriate textures/consistencies   -Encourage good PO intake   -Honor food preferences as able  -Weekly wts  -Monitor chemistry, GI function, and skin integrity

## 2025-02-21 NOTE — DIETITIAN INITIAL EVALUATION ADULT - PROBLEM SELECTOR PLAN 1
Ortho consulted in ED, s/p closed reduction confirmed by post-reduction XRs 1/2025. Returns due to inability to walk due to recurrent dislocations  CT: CT of the pelvis demonstrates a dislocation of the right hip arthroplasty with the femoral component dislocated superiorly in relation to the acetabular component. There is no acute displaced fracture.  Plan:  -f/u ortho recs  -RCRI: 1 point 6% risk of cardiac event, METS 1 (due to inability to ambulate)  -pain control: tylenol 650mg q6 prn, tramadol 25 mg q6 prn

## 2025-02-21 NOTE — DIETITIAN INITIAL EVALUATION ADULT - PERTINENT MEDS FT
MEDICATIONS  (STANDING):  acetaminophen   IVPB .. 1000 milliGRAM(s) IV Intermittent every 6 hours  aspirin Suppository 300 milliGRAM(s) Rectal daily  chlorhexidine 2% Cloths 1 Application(s) Topical <User Schedule>  dextrose 5%. 1000 milliLiter(s) (100 mL/Hr) IV Continuous <Continuous>  dextrose 50% Injectable 25 Gram(s) IV Push once  dextrose 50% Injectable 25 Gram(s) IV Push once  glucagon  Injectable 1 milliGRAM(s) IntraMuscular once  glucagon  Injectable 1 milliGRAM(s) IntraMuscular once  insulin lispro (ADMELOG) corrective regimen sliding scale   SubCutaneous every 6 hours  lactated ringers. 1000 milliLiter(s) (100 mL/Hr) IV Continuous <Continuous>  levETIRAcetam   Injectable 1000 milliGRAM(s) IV Push every 12 hours    MEDICATIONS  (PRN):  hydrALAZINE Injectable 10 milliGRAM(s) IV Push every 6 hours PRN SBP >160  HYDROmorphone  Injectable 0.2 milliGRAM(s) IV Push every 4 hours PRN Severe Pain (7 - 10)  ondansetron Injectable 4 milliGRAM(s) IV Push every 6 hours PRN Nausea and/or Vomiting

## 2025-02-21 NOTE — RAPID RESPONSE TEAM SUMMARY - NSSITUATIONBACKGROUNDRRT_GEN_ALL_CORE
Rapid Response called overhead at PACU.  Patient seen bedside, rapid response called for potential seizure activity.  Patient lethargic, responds to pain, and intermittently responds to voice.   VS, HR 81, SpO2 100%, /64, RR 12. FSG done, 198.  Patient with history of brain metastasis, decision made for keppra load, and transfer to SICU.  Intensivist, Dr. Mojica bedside. STAT labs drawn. Plan for CTH non-contrast

## 2025-02-21 NOTE — DIETITIAN INITIAL EVALUATION ADULT - NSFNSPHYEXAMSKINFT_GEN_A_CORE
Pressure Injury 1: coccyx, Stage I  Pressure Injury 2: Left:, heel, Stage I  Pressure Injury 3: Right:, heel, Stage I  Pressure Injury 4: none, none  Pressure Injury 5: none, none  Pressure Injury 6: none, none  Pressure Injury 7: none, none  Pressure Injury 8: none, none  Pressure Injury 9: none, none  Pressure Injury 10: none, none  Pressure Injury 11: none, none, Pressure Injury 1: coccyx, Stage I  Pressure Injury 2: Left:, heel, Stage I  Pressure Injury 3: Right:, heel, Stage I  Pressure Injury 4: none, none  Pressure Injury 5: none, none  Pressure Injury 6: none, none  Pressure Injury 7: none, none  Pressure Injury 8: none, none  Pressure Injury 9: none, none  Pressure Injury 10: none, none  Pressure Injury 11: none, none

## 2025-02-21 NOTE — DIETITIAN INITIAL EVALUATION ADULT - OTHER INFO
74 year old female with history of  lung cancer (w/ mets to brain), CKD, HLD, DM2, RA (follows with Dr. Johnston), RLE DVT (off Eliquis d/t frequent falls), R hip replacement with multiple recent dislocations (1/13/25 with closed reduction done at that time), sent by Dr. Almanzar for admission for R hip surgery and medical optimization. Patient underwent surgery on 2/20 and while in PACU, had potential seizure activity and a rapid response was called. Patient was lethargic, responded to pain, and intermittently responded to voice, vital signs were stable. She has a history brain metastasis and the decision was made for keppra load and transfer to SICU.    Pt assessed in room on 7EA. Rx and labs reviewed. Pt received resting in bed, not alert or able to participate in nutrition assessment. Pt presents with no overt signs of nutritional wasting. Spoke with RN at bedside who reports pt is largely inappropriate for assessment; AMS and limited ability to participate in care. Pt in SICU for management status post hip fracture repair and AMS monitoring. Monitor for ability to tolerate PO and initiate diet; recommend regular with appropriate textures/consistencies. No reported complaints of nausea/vomiting/constipation/diarrhea or difficult chew/swallow. Documented allergy to citrus and strawberry; kitchen aware and able to honor once diet started. RDN to remain available, see recommendations below.     Pain: 0 per chart review   GI: Abdomen non-distended/non-tender, +BS x4, last bowel movement 2/18  Skin: pressure injury stage I coccyx, pressure injury stage I bilateral heel, +3 R hip

## 2025-02-21 NOTE — PROGRESS NOTE ADULT - SUBJECTIVE AND OBJECTIVE BOX
Patient w/ tonic clonic seizure activity the past hour in PACU on 10th floor    Hemodynamically stable otherwise, however given history of brain Mets and being off seizure meds since admission a rapid was called and medicine/RR team recommending upgrading to SICU care, SICU planning EEG, CT head, starting seizure meds, and monitoring for the rest of the night under SICU care.      Patient NVI on exam and following commands several hours prior to episode during Post op check. Now lethargic responds to pain, and intermittently responds to voice.   VS, HR 81, SpO2 100%, /64, RR 12. FSG done, 198. Lactate 5.6. H/H - 9.2/30.4    Plan  -Reassess after imaging/testing

## 2025-02-21 NOTE — CONSULT NOTE ADULT - SUBJECTIVE AND OBJECTIVE BOX
Epilepsy consult note    MICHAEL READ74yFemale    Primary HPI:  74 year old female with history of  lung cancer (w/ mets to brain), CKD, HLD, DM2, RA (follows with Dr. Johnston), RLE DVT (off Eliquis d/t frequent falls), R hip replacement with multiple recent dislocations(25 with closed reduction done at that time), sent by Dr. Almanzar for admission for R hip surgery. States that she has been unable to walk since December due to continued R hip dislocation. She is currently residing at [facility] and notes she has not been walking since she got there due to the dislocation. Denies current pain at the joint site and denies recent falls since her hospitalization. Went to Dr. Almanzar's office today for follow up appointment and had XR imaging showing R hip dislocation--decision made to undergo R hip surgery. Denies recent illness, f/c, chest pain, sob, cough, n/v, dysuria.      MEDICATIONS    acetaminophen   IVPB .. 1000 milliGRAM(s) IV Intermittent every 6 hours  aspirin Suppository 300 milliGRAM(s) Rectal daily  chlorhexidine 2% Cloths 1 Application(s) Topical <User Schedule>  dextrose 5%. 1000 milliLiter(s) IV Continuous <Continuous>  dextrose 50% Injectable 25 Gram(s) IV Push once  dextrose 50% Injectable 25 Gram(s) IV Push once  glucagon  Injectable 1 milliGRAM(s) IntraMuscular once  glucagon  Injectable 1 milliGRAM(s) IntraMuscular once  hydrALAZINE Injectable 10 milliGRAM(s) IV Push every 6 hours PRN  HYDROmorphone  Injectable 0.2 milliGRAM(s) IV Push every 4 hours PRN  insulin lispro (ADMELOG) corrective regimen sliding scale   SubCutaneous every 6 hours  lactated ringers. 1000 milliLiter(s) IV Continuous <Continuous>  levETIRAcetam   Injectable 1000 milliGRAM(s) IV Push every 12 hours  ondansetron Injectable 4 milliGRAM(s) IV Push every 6 hours PRN         Family history: No history of dementia, strokes, or seizures   FAMILY HISTORY:    SOCIAL HISTORY -- No history of tobacco or alcohol use     Allergies    citrus (Urticaria)  strawberry (Pruritus)  Bactrim (Rash)    Intolerances            Vital Signs Last 24 Hrs  T(C): 35.3 (2025 14:50), Max: 36.8 (2025 01:55)  T(F): 95.5 (2025 14:50), Max: 98.2 (2025 01:55)  HR: 66 (2025 14:00) (56 - 88)  BP: 166/79 (2025 14:00) (99/54 - 195/76)  BP(mean): 113 (:00) (72 - 116)  RR: 24 (:00) (8 - 31)  SpO2: 99% (:00) (96% - 100%)    Parameters below as of :00  Patient On (Oxygen Delivery Method): room air          REVIEW OF SYSTEMS:    Constitutional: No fever, chills, fatigue, weakness  Eyes: no eye pain, visual disturbances, or discharge  ENT:  No difficulty hearing, tinnitus, vertigo; No sinus or throat pain  Neck: No pain or stiffness  Respiratory: No cough, dyspnea, wheezing   Cardiovascular: No chest pain, palpitations,   Gastrointestinal: No abdominal or epigastric pain. No nausea, vomiting  No diarrhea or constipation.   Genitourinary: No dysuria, frequency, hematuria or incontinence  Neurological: No headaches, lightheadedness, vertigo, numbness or tremors  Psychiatric: No depression, anxiety, mood swings or difficulty sleeping  Musculoskeletal: No joint pain or swelling; No muscle, back or extremity pain  Skin: No itching, burning, rashes or lesions   Lymph Nodes: No enlarged glands  Endocrine: No heat or cold intolerance; No hair loss, No h/o diabetes or thyroid dysfunction  Allergy and Immunologic: No hives or eczema    On Neurological Examination:    Mental Status - Patient is awake, oriented x0. Confused, repetitively saying yes to all questions.    Follows very simple commands but unable to answer questions appropriately    Speech -   Fluent                       Cranial Nerves - Pupils 3 mm equal and reactive to light,   Unable to assess extraocular movement    Motor Exam  Right upper - Squeezes hand, unable to move against gravity when prompted. Movement with noxious.  Left upper - Squeezes hand, unable to move against gravity when prompted. Movement with noxious.  Right lower - No movement when prompted. Movement with noxious stimuli  Left lower  - No movement when prompted. Movement with noxious stimuli.    Muscle tone - is normal all over. No asymmetry is seen.    Sensory    Unclear if she has intact sensation, patient says she has sensation whether you touch her or not.    Gait -  Unable to assess     GENERAL Exam:     Nontoxic  		  LUNGS:	Clear bilaterally  No Wheeze  Decreased bilaterally  	  HEART:	 Normal S1S2   No murmur RRR        	  GI/ ABDOMEN:  Soft  Non tender    EXTREMITIES:   No Edema  No Clubbing  No Cyanosis No Edema    MUSCULOSKELETAL: Unable to assess range of motion. No tenderness on palpation.  	   SKIN:      Normal   No Ecchymosis        LABS:  CBC Full  -  ( 2025 05:23 )  WBC Count : 16.87 K/uL  RBC Count : 3.07 M/uL  Hemoglobin : 8.5 g/dL  Hematocrit : 26.7 %  Platelet Count - Automated : 183 K/uL  Mean Cell Volume : 87.0 fl  Mean Cell Hemoglobin : 27.7 pg  Mean Cell Hemoglobin Concentration : 31.8 g/dL  Auto Neutrophil # : x  Auto Lymphocyte # : x  Auto Monocyte # : x  Auto Eosinophil # : x  Auto Basophil # : x  Auto Neutrophil % : x  Auto Lymphocyte % : x  Auto Monocyte % : x  Auto Eosinophil % : x  Auto Basophil % : x    Urinalysis Basic - ( 2025 07:44 )    Color: Yellow / Appearance: Clear / S.022 / pH: x  Gluc: x / Ketone: Trace mg/dL  / Bili: Negative / Urobili: 0.2 mg/dL   Blood: x / Protein: 300 mg/dL / Nitrite: Negative   Leuk Esterase: Trace / RBC: 1 /HPF / WBC 4 /HPF   Sq Epi: x / Non Sq Epi: 4 /HPF / Bacteria: Negative /HPF          142  |  107  |  47[H]  ----------------------------<  246[H]  4.4   |  18[L]  |  1.80[H]    Ca    8.6      2025 05:23  Phos  5.3     -21  Mg     1.6     -21      Hemoglobin A1C:       Vitamin B12   PT/INR - ( 2025 05:23 )   PT: 12.6 sec;   INR: 1.08          PTT - ( 2025 05:23 )  PTT:27.4 sec      RADIOLOGY    EKG                     Epilepsy consult note    MICHAEL READ74yFemale    Primary HPI:  74 year old female with history of  lung cancer (w/ mets to brain), CKD, HLD, DM2, RA (follows with Dr. Johnston), RLE DVT (off Eliquis d/t frequent falls), R hip replacement with multiple recent dislocations(25 with closed reduction done at that time), sent by Dr. Almanzar for admission for R hip surgery. States that she has been unable to walk since December due to continued R hip dislocation. She is currently residing at [facility] and notes she has not been walking since she got there due to the dislocation. Denies current pain at the joint site and denies recent falls since her hospitalization. Went to Dr. Almanzar's office today for follow up appointment and had XR imaging showing R hip dislocation--decision made to undergo R hip surgery. Denies recent illness, f/c, chest pain, sob, cough, n/v, dysuria.      MEDICATIONS    acetaminophen   IVPB .. 1000 milliGRAM(s) IV Intermittent every 6 hours  aspirin Suppository 300 milliGRAM(s) Rectal daily  chlorhexidine 2% Cloths 1 Application(s) Topical <User Schedule>  dextrose 5%. 1000 milliLiter(s) IV Continuous <Continuous>  dextrose 50% Injectable 25 Gram(s) IV Push once  dextrose 50% Injectable 25 Gram(s) IV Push once  glucagon  Injectable 1 milliGRAM(s) IntraMuscular once  glucagon  Injectable 1 milliGRAM(s) IntraMuscular once  hydrALAZINE Injectable 10 milliGRAM(s) IV Push every 6 hours PRN  HYDROmorphone  Injectable 0.2 milliGRAM(s) IV Push every 4 hours PRN  insulin lispro (ADMELOG) corrective regimen sliding scale   SubCutaneous every 6 hours  lactated ringers. 1000 milliLiter(s) IV Continuous <Continuous>  levETIRAcetam   Injectable 1000 milliGRAM(s) IV Push every 12 hours  ondansetron Injectable 4 milliGRAM(s) IV Push every 6 hours PRN         Family history: No history of dementia, strokes, or seizures   FAMILY HISTORY:    SOCIAL HISTORY -- No history of tobacco or alcohol use     Allergies    citrus (Urticaria)  strawberry (Pruritus)  Bactrim (Rash)    Intolerances            Vital Signs Last 24 Hrs  T(C): 35.3 (2025 14:50), Max: 36.8 (2025 01:55)  T(F): 95.5 (2025 14:50), Max: 98.2 (2025 01:55)  HR: 66 (2025 14:00) (56 - 88)  BP: 166/79 (2025 14:00) (99/54 - 195/76)  BP(mean): 113 (:00) (72 - 116)  RR: 24 (:00) (8 - 31)  SpO2: 99% (:00) (96% - 100%)    Parameters below as of :00  Patient On (Oxygen Delivery Method): room air          REVIEW OF SYSTEMS:    Constitutional: No fever, chills, fatigue, weakness  Eyes: no eye pain, visual disturbances, or discharge  ENT:  No difficulty hearing, tinnitus, vertigo; No sinus or throat pain  Neck: No pain or stiffness  Respiratory: No cough, dyspnea, wheezing   Cardiovascular: No chest pain, palpitations,   Gastrointestinal: No abdominal or epigastric pain. No nausea, vomiting  No diarrhea or constipation.   Genitourinary: No dysuria, frequency, hematuria or incontinence  Neurological: No headaches, lightheadedness, vertigo, numbness or tremors  Psychiatric: No depression, anxiety, mood swings or difficulty sleeping  Musculoskeletal: No joint pain or swelling; No muscle, back or extremity pain  Skin: No itching, burning, rashes or lesions   Lymph Nodes: No enlarged glands  Endocrine: No heat or cold intolerance; No hair loss, No h/o diabetes or thyroid dysfunction  Allergy and Immunologic: No hives or eczema    On Neurological Examination:    Mental Status - Patient is awake, oriented x0. Confused, repetitively saying yes to all questions.    Follows very simple commands but unable to answer questions appropriately    Speech -   Fluent                       Cranial Nerves - Pupils 2 mm equal and reactive to light  Unable to assess extraocular movement    Motor Exam  Right upper - Squeezes hand, unable to move against gravity when prompted. Movement with noxious stimuli.  Left upper - Squeezes hand, unable to move against gravity when prompted. Movement with noxious stimuli.  Right lower - No movement when prompted. Movement with noxious stimuli  Left lower  - No movement when prompted. Movement with noxious stimuli.    Muscle tone - is normal all over. No asymmetry is seen.    Sensory    Unclear if she has intact sensation, patient says she has sensation whether you touch her or not.    Gait -  Unable to assess     GENERAL Exam:     Nontoxic  		  LUNGS:	Clear bilaterally  No Wheeze  Decreased bilaterally  	  HEART:	 Normal S1S2   No murmur RRR        	  GI/ ABDOMEN:  Soft  Non tender    EXTREMITIES:   No Edema  No Clubbing  No Cyanosis No Edema    MUSCULOSKELETAL: Unable to assess range of motion. No tenderness on palpation.  	   SKIN:      Normal   No Ecchymosis        LABS:  CBC Full  -  ( 2025 05:23 )  WBC Count : 16.87 K/uL  RBC Count : 3.07 M/uL  Hemoglobin : 8.5 g/dL  Hematocrit : 26.7 %  Platelet Count - Automated : 183 K/uL  Mean Cell Volume : 87.0 fl  Mean Cell Hemoglobin : 27.7 pg  Mean Cell Hemoglobin Concentration : 31.8 g/dL  Auto Neutrophil # : x  Auto Lymphocyte # : x  Auto Monocyte # : x  Auto Eosinophil # : x  Auto Basophil # : x  Auto Neutrophil % : x  Auto Lymphocyte % : x  Auto Monocyte % : x  Auto Eosinophil % : x  Auto Basophil % : x    Urinalysis Basic - ( 2025 07:44 )    Color: Yellow / Appearance: Clear / S.022 / pH: x  Gluc: x / Ketone: Trace mg/dL  / Bili: Negative / Urobili: 0.2 mg/dL   Blood: x / Protein: 300 mg/dL / Nitrite: Negative   Leuk Esterase: Trace / RBC: 1 /HPF / WBC 4 /HPF   Sq Epi: x / Non Sq Epi: 4 /HPF / Bacteria: Negative /HPF          142  |  107  |  47[H]  ----------------------------<  246[H]  4.4   |  18[L]  |  1.80[H]    Ca    8.6      2025 05:23  Phos  5.3     -  Mg     1.6     -21      Hemoglobin A1C:       Vitamin B12   PT/INR - ( 2025 05:23 )   PT: 12.6 sec;   INR: 1.08          PTT - ( 2025 05:23 )  PTT:27.4 sec      RADIOLOGY    EKG

## 2025-02-21 NOTE — DIETITIAN INITIAL EVALUATION ADULT - PROBLEM SELECTOR PLAN 8
Was previously on eliquis, but taken off due to multiple falls. Had LE dopplers at last admission 1/2025 that were negative for DVT  -bilateral LE tender to palpation with 1+ edema bilaterally, has been sedentary for nearly a month at minimum  Wells: 3  Plan:  -f/u d-dimer

## 2025-02-21 NOTE — CONSULT NOTE ADULT - ASSESSMENT
74 year old female with history of  lung cancer (w/ mets to brain), Stage 4 CKD, HLD, DM2, RA (follows with Dr. Johnston), hx of RLE DVT (off Eliquis d/t frequent falls), R hip replacement with multiple recent dislocations (1/13/25 with closed reduction done at that time), admitted pre-op 2/18 for medical optimization, now s/p minimally invasive right hip replacement on 2/20 complicated by tonic clonic seizure in PACU, transferred to SICU for altered mental status, now s/p CT scan, concern for stroke vs motion artifact pending further work up including EEG and MRI.     NEURO: A&Ox0 with AMS, ?Stroke vs seizure; CT scan noted possible anterior infarct vs motion artificat (pt was moving a lot) levitriacetam 1000mg q12; zofran prn; s/p 1 dose of 4mg IV ativan; spinal anesthesia 3mL Bupivacine intra-thecal and 2 lidocaine 4% patches post-op. Known left cerebellar chronic infarcation, chronic lacurnar infarcts. 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 Has had VRE sensitive to ampicillin (10/2024) in the past as well Klebsiella sensitive to CTX (07/2024), Klebsiella sensitive only to carbapenems   ENDO: ISS; Hx of diabetes on Glargine and lispro SSI on floor; A1c 6.8 (10.3%);   MSK: hx of RA - 2mg Decadron during flares   ID: Cefazolin x2 doses   PPX: SCDs; no SQH (pending ortho discussion); Hx of RLE DVT (off eliquis due to recurrent falls)  LINES: PIVs,   WOUNDS/DRAINS: right sided prevena on incision  PT/OT: not ordered

## 2025-02-21 NOTE — PROVIDER CONTACT NOTE (CRITICAL VALUE NOTIFICATION) - BACKGROUND
Patient was squirming, fighting during blood draw.  Tourniquet only applied once, no repeat snapping.  Only poked once on arm where blood was successfully drawn.

## 2025-02-21 NOTE — DIETITIAN INITIAL EVALUATION ADULT - PROBLEM SELECTOR PLAN 2
On admission 185/83. Has had hypertensive urgency during prior admissions as well.  Home : nifedipine 90 mg AM  Plan:  -resume home meds, if persistently hypertensive consider hydralazine

## 2025-02-21 NOTE — PROGRESS NOTE ADULT - SUBJECTIVE AND OBJECTIVE BOX
Ortho Note    Patient seen with daughter at bedside. Confused and disoriented.     Vital Signs Last 24 Hrs  T(C): 35.3 (02-21-25 @ 14:50), Max: 36.4 (02-21-25 @ 09:20)  T(F): 95.5 (02-21-25 @ 14:50), Max: 97.6 (02-21-25 @ 09:20)  HR: 66 (02-21-25 @ 14:00) (59 - 76)  BP: 166/79 (02-21-25 @ 14:00) (136/75 - 195/76)  BP(mean): 113 (02-21-25 @ 14:00) (100 - 113)  RR: 24 (02-21-25 @ 14:00) (18 - 24)  SpO2: 99% (02-21-25 @ 14:00) (96% - 99%)  I&O's Summary    20 Feb 2025 07:01  -  21 Feb 2025 07:00  --------------------------------------------------------  IN: 1050 mL / OUT: 200 mL / NET: 850 mL    21 Feb 2025 07:01  -  21 Feb 2025 15:18  --------------------------------------------------------  IN: 870 mL / OUT: 310 mL / NET: 560 mL        General: Pt A&O x 0, confused/disoriented  DSG C/D/I Right hip with prevena  Pulses intact RLE  Sensation intact RLE  Motor grossly intact RLE, unable to assess further due to patient current mental status                          8.5    16.87 )-----------( 183      ( 21 Feb 2025 05:23 )             26.7     02-21    142  |  107  |  47[H]  ----------------------------<  246[H]  4.4   |  18[L]  |  1.80[H]    Ca    8.6      21 Feb 2025 05:23  Phos  5.3     02-21  Mg     1.6     02-21        A/P: 74yFemale POD#1 s/p Revision Right THR  - Pain Control  - DVT ppx: discussed with yunier Fuller for any type of anticoagulation necessary.   - PT/OT WBS: WBAT RLE with posterior hip precautions  - abduction pillow while in bed  - d/c ronny when appropriate    Ortho Pager [phone]

## 2025-02-21 NOTE — CHART NOTE - NSCHARTNOTEFT_GEN_A_CORE
_________________________TRANSFER 4URIS to SICU_________________________    Hospital Course:   74 year old female with history of  lung cancer (w/ mets to brain), CKD, HLD, DM2, RA (follows with Dr. Johnston), RLE DVT (off Eliquis d/t frequent falls), R hip replacement with multiple recent dislocations (1/13/25 with closed reduction done at that time), sent by Dr. Almanzar for admission for R hip surgery and medical optimization. Patient underwent surgery on 2/20 and while in PACU, had potential seizure activity and a rapid response was called. Patient was lethargic, responded to pain, and intermittently responded to voice, vital signs were stable. She has a history brain metastasis and the decision was made for keppra load and transfer to SICU.

## 2025-02-21 NOTE — CONSULT NOTE ADULT - ATTENDING COMMENTS
Post op GTC. Post ictal on assessment in PACU. History of lung CA with brain mets, has had inconsistencies receiving therapy given recent hospitalizations. DDX seizure 2/2 structural lesion vs lidocaine toxicity given intrathecal bupivicaine given for case (although temporally this does not correlate). Keppra load, STAT CTH, VEEG, admit to SICU. Neurology consult.
73y/o female s/p R ROSITA in 2022 [facility] for management of displaced femoral neck fracture. She had two early dislocations followed by stable hip function for about three years, now with two subsequent posterior dislocations. The right hip is currently out of socket; this may have been going on for days or weeks at this point. CT demonstrates a frankly retroverted acetabular component, which would appear to be her primary risk factor for ongoing posterior dislocations.  There is no radiographic evidence of periprosthetic fracture or component loosening.    There is close to 100% likelihood of ongoing dislocations given her recurrent instability and malpositioned components. We reviewed that surgical management in this case would entail revision right ROSITA (acetabular component, head, liner).  Informed consent was obtained after a detailed discussion of the risks, benefits, and alternatives. Specific risks discussed in detail included infection, wound complications, limb length discrepancy, stiffness, instability, crepitation, neurovascular injury, persistent pain, fixation failure, loosening, implant wear, osteolysis, venous thromboembolism, and other perioperative medical and anesthetic complications including the small risk of death.  Patient questions were answered in detail.  We reviewed that she has multiple medical comorbidities including metastatic lung cancer, DM, CKD. She is aware that she is at increased risk for perioperative complications given these risk factors. She still wishes to proceed with surgery.     I am making tentative preparations for the surgery to take place this upcoming Thursday 2/20/25, contingent upon medical clearance.
see progress note

## 2025-02-21 NOTE — PROGRESS NOTE ADULT - SUBJECTIVE AND OBJECTIVE BOX
CT in December looked okay.   24HR EVENTS:     SUBJECTIVE: Pt seen and examined on morning rounds. Responds to commands but moaning in pain   Denies CP, SOB, N/V, new onset motor/sensory deficits    Vital Signs Last 24 Hrs  T(C): 35.3 (21 Feb 2025 14:50), Max: 36.8 (21 Feb 2025 01:55)  T(F): 95.5 (21 Feb 2025 14:50), Max: 98.2 (21 Feb 2025 01:55)  HR: 62 (21 Feb 2025 17:00) (56 - 88)  BP: 152/77 (21 Feb 2025 17:00) (99/54 - 195/76)  BP(mean): 105 (21 Feb 2025 17:00) (72 - 116)  RR: 18 (21 Feb 2025 17:00) (8 - 31)  SpO2: 93% (21 Feb 2025 17:00) (93% - 100%)    Parameters below as of 21 Feb 2025 17:00  Patient On (Oxygen Delivery Method): room air        Physical Exam:  General: NAD, resting comfortably in bed  leg length symmetric   silt sural/saph/dpn/spn/tib RLE   unable to assess motor due to patient current mental status     Labs:                        8.5    16.87 )-----------( 183      ( 21 Feb 2025 05:23 )             26.7     21 Feb 2025 05:23    142    |  107    |  47     ----------------------------<  246    4.4     |  18     |  1.80     Ca    8.6        21 Feb 2025 05:23  Phos  5.3       21 Feb 2025 05:23  Mg     1.6       21 Feb 2025 05:23      PT/INR - ( 21 Feb 2025 05:23 )   PT: 12.6 sec;   INR: 1.08          PTT - ( 21 Feb 2025 05:23 )  PTT:27.4 sec      Assessment/Plan:  74yF s/p R hip full cup revision by Dr. APARNA Almanzar on 02-20   - Weight Bearing Status: WBAT, PHP   - Pain control  - DVT PPx: ASA   - PT rec: pending   - F/u AM labs    Landen Banegas, PGY-4  Orthopedic Surgery

## 2025-02-21 NOTE — CONSULT NOTE ADULT - REASON FOR ADMISSION
R hip dislocation w/ need for medical optimization

## 2025-02-21 NOTE — CONSULT NOTE ADULT - SUBJECTIVE AND OBJECTIVE BOX
74 year old female with history of  lung cancer (w/ mets to brain) last chemo dose in 2024, Stage 4 CKD (baseline Cr 1.2-2, EGFR <45), HLD, DM2, RA (follows with Dr. Johnston), RLE DVT (off Eliquis d/t frequent falls), hx of multiple R hip recent dislocations (1/13/25 with closed reduction done at that time), admitted pre-op for R hip surgery and medical optimization. In PACU patient had a tonic clonic seizure and RRT was called, SICU immediately evaluated but did not visualize the seizure, pt appeared to be post-ictal but was waking up slowly but remained dis-oriented throughout evaluation and not obeying commands but did not exhibit any focal deficits, pupils were equal and reactive, vitals were all WNL throughout. Patient is s/p R hip replacement spinal anesthesia (Bupivacaine) and had 2 lidocaine pathces on shoulder. Pt was given 1 dose 1000mg Keppra, and CT done - findings concerning for possible stroke VS motion artificat. Patient admitted to SICU for further investigation of AMS.     Regarding cancer, has had multiple delays in chemo due to hyperglycemia and non-compliance with food restrctions, hence, changed to single agent gemcitabine, many missed appointments, livers in a [employer] cooks independently for the most part, but does have help from daughter, very forgetfull and often forgets washed laundry, was evaluated for hip surgery due to inability to be independent and not being able to walk and was deemed candidate for aforementioned surgery      PMH: of  lung cancer (w/ mets to brain) last chemo dose in 2024, Stage 4 CKD (baseline Cr 1.2-2, EGFR <45); HLD, DM2, RA (follows with Dr. Johnston), RLE DVT (off Eliquis d/t frequent falls), hx of multiple R hip recent dislocations (1/13/25 with closed reduction done at that time); now s/p Hip replacement 2/20     ICU Vital Signs Last 24 Hrs  T(F): 98.2 (02-21-25 @ 01:55), Max: 98.3 (02-20-25 @ 05:23)  HR: 70 (02-21-25 @ 04:00) (56 - 88)  BP: 124/67 (02-21-25 @ 04:00) (99/54 - 168/77)  BP(mean): 89 (02-21-25 @ 04:00) (72 - 104)  RR: 19 (02-21-25 @ 04:00) (8 - 31)  SpO2: 99% (02-21-25 @ 04:00) (95% - 100%)    PHYSICAL EXAM:   Neurological: AAOx0, not following commands, awakens to voice and tracks; GCS 10 ([id number])  ENT: mucus membrane moist  Cardiovascular: RRR, no extra heart sounds or murmurs   Respiratory: CTA; no wheezes or rhonci   Gastrointestinal: soft, NT, ND, BS+  Extremities: warm, palpable pulses, right hip has prevena dressing   Vascular: no cyanosis/erythema  Skin: no rashes  MSK: no joint swelling.     LABS:    02-20    142  |  104  |  39[H]  ----------------------------<  77  3.8   |  23  |  1.71[H]    Ca    9.6      20 Feb 2025 11:11  Phos  5.6     02-21  Mg     1.7     02-21                       9.2    14.72 )-----------( 210      ( 21 Feb 2025 02:23 )             30.4   PT/INR - ( 21 Feb 2025 02:23 )   PT: 12.2 sec;   INR: 1.04         PTT - ( 21 Feb 2025 02:23 )  PTT:28.9 sec  Urinalysis Basic - ( 20 Feb 2025 11:11 )    Color: x / Appearance: x / SG: x / pH: x  Gluc: 77 mg/dL / Ketone: x  / Bili: x / Urobili: x   Blood: x / Protein: x / Nitrite: x   Leuk Esterase: x / RBC: x / WBC x   Sq Epi: x / Non Sq Epi: x / Bacteria: x    CAPILLARY BLOOD GLUCOSE    POCT Blood Glucose.: 198 mg/dL (21 Feb 2025 02:24)  POCT Blood Glucose.: 151 mg/dL (20 Feb 2025 21:31)  POCT Blood Glucose.: 170 mg/dL (20 Feb 2025 20:26)  POCT Blood Glucose.: 119 mg/dL (20 Feb 2025 13:59)  POCT Blood Glucose.: 62 mg/dL (20 Feb 2025 12:59)  POCT Blood Glucose.: 82 mg/dL (20 Feb 2025 09:38)

## 2025-02-21 NOTE — CONSULT NOTE ADULT - ASSESSMENT
Patient is a 74 y.o. female with PMHx of known stage 4 NSCLC with brain metastases on diagnosis s/p carboplatin, taxol, and pembrolizumab with partial response but continued disease progression in the brain, liver, and lungs. Initially presented for hip arthroplasty and then suspected to have seizure activity in the PACU. Epilepsy was consulted for management of seizures in the setting of brain metastases.    Patient likely had some form of seizures which would not be surprising given the clear predisposition with brain metastases and prior radiotherapy to the CNS. Additionally she had a high lactate which peaked at 10.0 despite normal hemodynamics and a normal ABG. CT Head done at this time was concerning for possible acute infarction within the right frontal lobe, however prior notes have mentioned a possible brain metastasis to the right frontal lobe (0.9cm). Patient was given ativan 4mg IVP at that time to terminate possible seizures and was started on Keppra 1g BID.    Recommendations:    - Please obtain an MRI with IV contrast to assess for possible brain metastasis at the site of suspected acute infarction  - Consider neurosurgical evaluation to determine if there is a role for steroids in the setting of possible frontal lobe brain metastasis in setting of seizure  - Continue Keppra 1g BID  - Continue vEEG    RECOMMENDATIONS ARE NOT FINAL UNTIL ATTESTED BY ATTENDING PHYSICIAN Patient is a 74 y.o. female with PMHx of known stage 4 NSCLC with brain metastases on diagnosis s/p carboplatin, taxol, and pembrolizumab with partial response but continued disease progression in the brain, liver, and lungs. Initially presented for hip arthroplasty and then suspected to have seizure activity in the PACU. Epilepsy was consulted for management of seizures in the setting of brain metastases.    Patient likely had some form of seizures which would not be surprising given the clear predisposition with brain metastases and prior radiotherapy to the CNS. Additionally she had a high lactate which peaked at 10.0 despite normal hemodynamics and a normal ABG. CT Head done at this time was concerning for possible acute infarction within the right frontal lobe, however prior notes have mentioned a possible brain metastasis to the right frontal lobe (0.9cm). Patient was given ativan 4mg IVP at that time to terminate possible seizures and was started on Keppra 1g BID.    Recommendations:    - Please obtain an MRI with and without IV contrast to assess for possible brain metastasis at the site of suspected acute infarction  - Decrease Keppra from 1g twice a day to 750mg twice a day  - Continue vEEG    RECOMMENDATIONS ARE NOT FINAL UNTIL ATTESTED BY ATTENDING PHYSICIAN Patient is a 74 y.o. female with PMHx of known stage 4 NSCLC with brain metastases on diagnosis s/p carboplatin, taxol, and pembrolizumab with partial response but continued disease progression in the brain, liver, and lungs. Initially presented for hip arthroplasty and then suspected to have seizure activity in the PACU. Epilepsy was consulted for management of seizures in the setting of brain metastases.    Patient likely had some form of seizures which would not be surprising given the clear predisposition with brain metastases and prior radiotherapy to the CNS. Additionally she had a high lactate which peaked at 10.0 despite normal hemodynamics and a normal ABG. CT Head done at this time was concerning for possible acute infarction within the right frontal lobe, however prior notes have mentioned a possible brain metastasis to the right frontal lobe (0.9cm). Patient was given ativan 4mg IVP at that time to terminate possible seizures and was started on Keppra 1g BID.    Recommendations:    - Please obtain an MRI with and without IV contrast to assess for possible brain metastasis at the site of suspected acute infarction  - Decrease Keppra from 1g twice a day to 750mg twice a day  - Repeat CT Head noncontrast today to evaluate for evolution of the area of possible acute infarction  - Continue vEEG    RECOMMENDATIONS ARE NOT FINAL UNTIL ATTESTED BY ATTENDING PHYSICIAN

## 2025-02-21 NOTE — PROGRESS NOTE ADULT - ASSESSMENT
74 year old female with history of  lung cancer (w/ mets to brain), Stage 4 CKD, HLD, DM2, RA (follows with Dr. Johnston), hx of RLE DVT (off Eliquis d/t frequent falls), R hip replacement with multiple recent dislocations (1/13/25 with closed reduction done at that time), admitted pre-op 2/18 for medical optimization, now s/p minimally invasive right hip replacement on 2/20 complicated by tonic clonic seizure in PACU, transferred to SICU for altered mental status, now s/p CT scan, concern for stroke vs motion artifact pending further work up including EEG and MRI.     NEURO: A&Ox2 with AMS, ?Stroke vs seizure; CT scan noted possible anterior infarct vs motion artifact. Levetiracetam 1000mg q12. Pain management with standing Acetaminophen IV, Dilaudid PRN for severe pain. IV Tylenol, zofran prn; spinal anesthesia 3mL Bupivacine intra-thecal and 2 lidocaine 4% patches post-op. Known left cerebellar chronic infarcation, chronic lacunar infarcts. Hx of dementia/forgetfulness/2ndary to brain mets.    CV: MAPs >65; hx of HTN, holding Nifedipine 90mg q24; HLD 40mg Atorvastatin. Hypertensive during rounds, Cont Hydral 10q6 PRN, SBP >160  PULM: on room; hx of NSLC of posterior aspect of left upper lobe abutting major fissure, with brain mets (PDL1 negative, STK11, P1K3CA, TP53); lingular poorly differentiated adenocarcinoma  GI/FEN: NPO given AMS; known 1.2cm pancreatic body cystic lesions   : TOV. strict I&Os; UA 2/21 with granular casts. CKD (baseline Cr 1.2- 2); known cystocele and bladder prolapsed below the pubic symphysis; Hx of recurrent GNR UTIs with MDR. Has had VRE sensitive to ampicillin (10/2024) in the past as well Klebsiella sensitive to CTX (07/2024), Klebsiella sensitive only to carbapenems   ENDO: ISS; Hx of diabetes on Glargine and lispro SSI on floor; A1c 6.8 (10.3%);   MSK: hx of RA - 2mg Decadron during flares   ID: Cefazolin x2 doses. UA clear  PPX: SCDs; started on aspirin suppository; Hx of RLE DVT (off eliquis due to recurrent falls)  LINES: PIVs,   WOUNDS/DRAINS: right sided prevena on incision  PT/OT: Ordered, will reassess once improved mental status.  Dispo: SICU

## 2025-02-21 NOTE — CHART NOTE - NSCHARTNOTEFT_GEN_A_CORE
Rapid Response called overhead at PACU.  Patient seen bedside, rapid response called for potential seizure activity.  Patient lethargic, responds to pain, and intermittently responds to voice.   VS, HR 81, SpO2 100%, /64, RR 12. FSG done, 198.  Patient with history of brain metastasis, decision made for keppra load, and transfer to SICU.  Intensivist, Dr. Mojica bedside. STAT labs drawn. Plan for CTH non-contrast.

## 2025-02-21 NOTE — PROGRESS NOTE ADULT - SUBJECTIVE AND OBJECTIVE BOX
INTERVAL/OVERNIGHT EVENTS:    SUBJECTIVE:       Neurologic Medications  acetaminophen   IVPB .. 1000 milliGRAM(s) IV Intermittent every 6 hours  aspirin Suppository 300 milliGRAM(s) Rectal daily  HYDROmorphone  Injectable 0.2 milliGRAM(s) IV Push every 4 hours PRN Severe Pain (7 - 10)  levETIRAcetam   Injectable 1000 milliGRAM(s) IV Push every 12 hours  ondansetron Injectable 4 milliGRAM(s) IV Push every 6 hours PRN Nausea and/or Vomiting    Respiratory Medications    Cardiovascular Medications  hydrALAZINE Injectable 10 milliGRAM(s) IV Push every 6 hours PRN SBP >160    Gastrointestinal Medications  dextrose 5%. 1000 milliLiter(s) IV Continuous <Continuous>  lactated ringers. 1000 milliLiter(s) IV Continuous <Continuous>    Genitourinary Medications    Hematologic/Oncologic Medications    Antimicrobial/Immunologic Medications    Endocrine/Metabolic Medications  dextrose 50% Injectable 25 Gram(s) IV Push once  dextrose 50% Injectable 25 Gram(s) IV Push once  glucagon  Injectable 1 milliGRAM(s) IntraMuscular once  glucagon  Injectable 1 milliGRAM(s) IntraMuscular once  insulin lispro (ADMELOG) corrective regimen sliding scale   SubCutaneous every 6 hours    Topical/Other Medications  chlorhexidine 2% Cloths 1 Application(s) Topical <User Schedule>      MEDICATIONS  (PRN):  hydrALAZINE Injectable 10 milliGRAM(s) IV Push every 6 hours PRN SBP >160  HYDROmorphone  Injectable 0.2 milliGRAM(s) IV Push every 4 hours PRN Severe Pain (7 - 10)  ondansetron Injectable 4 milliGRAM(s) IV Push every 6 hours PRN Nausea and/or Vomiting      I&O's Detail    2025 07:  -  2025 07:00  --------------------------------------------------------  IN:    IV PiggyBack: 350 mL    Lactated Ringers: 700 mL  Total IN: 1050 mL    OUT:    Indwelling Catheter - Urethral (mL): 200 mL  Total OUT: 200 mL    Total NET: 850 mL      2025 07:01  -  2025 14:22  --------------------------------------------------------  IN:    IV PiggyBack: 170 mL    Lactated Ringers: 700 mL  Total IN: 870 mL    OUT:    Indwelling Catheter - Urethral (mL): 310 mL  Total OUT: 310 mL    Total NET: 560 mL          Vital Signs Last 24 Hrs  T(C): 36.4 (2025 09:20), Max: 36.8 (2025 01:55)  T(F): 97.6 (2025 09:20), Max: 98.2 (2025 01:55)  HR: 66 (2025 14:00) (56 - 88)  BP: 166/79 (2025 14:00) (99/54 - 195/76)  BP(mean): 113 (2025 14:00) (72 - 116)  RR: 24 (2025 14:00) (8 - 31)  SpO2: 99% (2025 14:00) (96% - 100%)    Parameters below as of 2025 14:00  Patient On (Oxygen Delivery Method): room air        GENERAL: NAD, resting comfortably in bed  HEENT: NCAT, MMM  C/V: Normal rate, regular rhythm without murmurs, rubs, or gallops.   PULM: Nonlabored breathing, no respiratory distress, lungs clear to auscultation bilaterally  ABD: Soft, ND, NT, no rebound tenderness, no guarding  :   EXTREM: WWP, no edema, SCDs in place  NEURO: No focal deficits    LABS:                        8.5    16.87 )-----------( 183      ( 2025 05:23 )             26.7     02    142  |  107  |  47[H]  ----------------------------<  246[H]  4.4   |  18[L]  |  1.80[H]    Ca    8.6      2025 05:23  Phos  5.3       Mg     1.6           PT/INR - ( 2025 05:23 )   PT: 12.6 sec;   INR: 1.08          PTT - ( 2025 05:23 )  PTT:27.4 sec  Urinalysis Basic - ( 2025 07:44 )    Color: Yellow / Appearance: Clear / S.022 / pH: x  Gluc: x / Ketone: Trace mg/dL  / Bili: Negative / Urobili: 0.2 mg/dL   Blood: x / Protein: 300 mg/dL / Nitrite: Negative   Leuk Esterase: Trace / RBC: 1 /HPF / WBC 4 /HPF   Sq Epi: x / Non Sq Epi: 4 /HPF / Bacteria: Negative /HPF        RADIOLOGY & ADDITIONAL STUDIES:      Culture - Fungal, Tissue (collected 25 @ 19:27)  Source: Tissue (6)Right Hip Femur #6  Preliminary Report (25 @ 08:30):    Testing in progress    Culture - Tissue with Gram Stain (collected 25 @ 19:27)  Source: Tissue (6)Right Hip Femur #6  Gram Stain (25 @ 04:29):    No polymorphonuclear cells seen per low power field    No organisms seen per oil power field    Culture - Acid Fast - Tissue w/Smear (collected 25 @ 19:27)  Source: Tissue (5)Right Hip Fiscia #5    Culture - Fungal, Tissue (collected 25 @ 19:27)  Source: Tissue (5)Right Hip Fiscia #5  Preliminary Report (25 @ 08:30):    Testing in progress    Culture - Tissue with Gram Stain (collected 25 @ 19:27)  Source: Tissue (5)Right Hip Fiscia #5  Gram Stain (25 @ 04:28):    No polymorphonuclear cells seen per low power field    No organisms seen per oil power field    Culture - Acid Fast - Tissue w/Smear (collected 25 @ 19:27)  Source: Tissue (4)Right Hip Fiscia #4    Culture - Fungal, Tissue (collected 25 @ 19:27)  Source: Tissue (4)Right Hip Fiscia #4  Preliminary Report (25 @ 08:30):    Testing in progress    Culture - Tissue with Gram Stain (collected 25 @ 19:27)  Source: Tissue (4)Right Hip Fiscia #4  Gram Stain (25 @ 04:29):    No polymorphonuclear cells seen per low power field    No organisms seen per oil power field    Culture - Fungal, Tissue (collected 25 @ 19:27)  Source: Tissue (3)Right Hip Fiscia #3  Preliminary Report (25 @ 08:30):    Testing in progress    Culture - Tissue with Gram Stain (collected 25 @ 19:27)  Source: Tissue (3)Right Hip Fiscia #3  Gram Stain (25 @ 04:28):    No polymorphonuclear cells seen per low power field    No organisms seen per oil power field    Culture - Fungal, Tissue (collected 25 @ 19:27)  Source: Tissue (2)Right Hip Fiscia #2  Preliminary Report (25 @ 08:30):    Testing in progress    Culture - Tissue with Gram Stain (collected 25 @ 19:27)  Source: Tissue (2)Right Hip Fiscia #2  Gram Stain (25 @ 04:28):    No polymorphonuclear cells seen per low power field    No organisms seen per oil power field    Culture - Acid Fast - Tissue w/Smear (collected 25 @ 19:27)  Source: Tissue Right Hip Fiscia    Culture - Fungal, Tissue (collected 25 @ 19:27)  Source: Tissue Right Hip Fiscia  Preliminary Report (25 @ 08:30):    Testing in progress    Culture - Tissue with Gram Stain (collected 25 @ 19:27)  Source: Tissue Right Hip Fiscia  Gram Stain (25 @ 04:28):    No polymorphonuclear cells seen per low power field    No organisms seen per oil power field     INTERVAL/OVERNIGHT EVENTS:  S/p minimally invasive revision of Right Hip Replacement, UO 200cc, EBL 500cc, IVF 1L, on phenylephrine intraop. Rapid for unresponsiveness likely d/2 post ictal phase: hx of brain mets, keppra 2g load/1g BID moving forward. EEG ordered, pt refusing lead placement. CTH: Region of hypodensity within the right frontal lobe may represent artifact however cannot rule out infarct, recommend MRI follow up. LA 5.6. Salazar placed, and d/cd during rounds to prima fit. Lactate 1.9 (2.2, 5.6). Hypertensive during rounds, started on PRN Hydralazine.     S/p minimally invasive revision of Right Hip Replacement     SUBJECTIVE:   Patient seen and examined by team on AM rounds. Afebrile.  Hypertensive in the morning. Improved mental status, but still mild disoriented in time. Patient complaints of increased pain on right LE.     Neurologic Medications  acetaminophen   IVPB .. 1000 milliGRAM(s) IV Intermittent every 6 hours  aspirin Suppository 300 milliGRAM(s) Rectal daily  HYDROmorphone  Injectable 0.2 milliGRAM(s) IV Push every 4 hours PRN Severe Pain (7 - 10)  levETIRAcetam   Injectable 1000 milliGRAM(s) IV Push every 12 hours  ondansetron Injectable 4 milliGRAM(s) IV Push every 6 hours PRN Nausea and/or Vomiting    Respiratory Medications    Cardiovascular Medications  hydrALAZINE Injectable 10 milliGRAM(s) IV Push every 6 hours PRN SBP >160    Gastrointestinal Medications  dextrose 5%. 1000 milliLiter(s) IV Continuous <Continuous>  lactated ringers. 1000 milliLiter(s) IV Continuous <Continuous>    Genitourinary Medications    Hematologic/Oncologic Medications    Antimicrobial/Immunologic Medications    Endocrine/Metabolic Medications  dextrose 50% Injectable 25 Gram(s) IV Push once  dextrose 50% Injectable 25 Gram(s) IV Push once  glucagon  Injectable 1 milliGRAM(s) IntraMuscular once  glucagon  Injectable 1 milliGRAM(s) IntraMuscular once  insulin lispro (ADMELOG) corrective regimen sliding scale   SubCutaneous every 6 hours    Topical/Other Medications  chlorhexidine 2% Cloths 1 Application(s) Topical <User Schedule>      MEDICATIONS  (PRN):  hydrALAZINE Injectable 10 milliGRAM(s) IV Push every 6 hours PRN SBP >160  HYDROmorphone  Injectable 0.2 milliGRAM(s) IV Push every 4 hours PRN Severe Pain (7 - 10)  ondansetron Injectable 4 milliGRAM(s) IV Push every 6 hours PRN Nausea and/or Vomiting      I&O's Detail    2025 07:01  -  2025 07:00  --------------------------------------------------------  IN:    IV PiggyBack: 350 mL    Lactated Ringers: 700 mL  Total IN: 1050 mL    OUT:    Indwelling Catheter - Urethral (mL): 200 mL  Total OUT: 200 mL    Total NET: 850 mL      2025 07:01  -  2025 14:22  --------------------------------------------------------  IN:    IV PiggyBack: 170 mL    Lactated Ringers: 700 mL  Total IN: 870 mL    OUT:    Indwelling Catheter - Urethral (mL): 310 mL  Total OUT: 310 mL    Total NET: 560 mL          Vital Signs Last 24 Hrs  T(C): 36.4 (2025 09:20), Max: 36.8 (2025 01:55)  T(F): 97.6 (2025 09:20), Max: 98.2 (2025 01:55)  HR: 66 (2025 14:00) (56 - 88)  BP: 166/79 (2025 14:00) (99/54 - 195/76)  BP(mean): 113 (2025 14:00) (72 - 116)  RR: 24 (2025 14:00) (8 - 31)  SpO2: 99% (2025 14:00) (96% - 100%)    Parameters below as of 2025 14:00  Patient On (Oxygen Delivery Method): room air        GENERAL: Aox1-2, mild-moderate acute distress, waxing and waning mental status.   HEENT: NCAT, MMM  C/V: Normal rate, hypertensive.   PULM: Cheyne-johnson breathing, bilateral clear lung fields on auscultation, mild ronchi when in distress.   ABD: Soft, ND, NT, no rebound tenderness, no guarding  : Prima-fit in place.   EXTREM: mildly cold, well perfused, no edema, SCDs in place, DP 2+, good capillary refill 2-3s.   -RLE: upper thigh with prevena in place. Mild tenderness to touch  -BUE: Mild cold to palpation, capillary refill 2-3s.   NEURO: No focal deficits    LABS:                        8.5    16.87 )-----------( 183      ( 2025 05:23 )             26.7         142  |  107  |  47[H]  ----------------------------<  246[H]  4.4   |  18[L]  |  1.80[H]    Ca    8.6      2025 05:23  Phos  5.3       Mg     1.6           PT/INR - ( 2025 05:23 )   PT: 12.6 sec;   INR: 1.08          PTT - ( 2025 05:23 )  PTT:27.4 sec  Urinalysis Basic - ( 2025 07:44 )    Color: Yellow / Appearance: Clear / S.022 / pH: x  Gluc: x / Ketone: Trace mg/dL  / Bili: Negative / Urobili: 0.2 mg/dL   Blood: x / Protein: 300 mg/dL / Nitrite: Negative   Leuk Esterase: Trace / RBC: 1 /HPF / WBC 4 /HPF   Sq Epi: x / Non Sq Epi: 4 /HPF / Bacteria: Negative /HPF        RADIOLOGY & ADDITIONAL STUDIES:      Culture - Fungal, Tissue (collected 25 @ 19:27)  Source: Tissue (6)Right Hip Femur #6  Preliminary Report (25 @ 08:30):    Testing in progress    Culture - Tissue with Gram Stain (collected 25 @ 19:27)  Source: Tissue (6)Right Hip Femur #6  Gram Stain (25 @ 04:29):    No polymorphonuclear cells seen per low power field    No organisms seen per oil power field    Culture - Acid Fast - Tissue w/Smear (collected 25 @ 19:27)  Source: Tissue (5)Right Hip Fiscia #5    Culture - Fungal, Tissue (collected 25 @ 19:27)  Source: Tissue (5)Right Hip Fiscia #5  Preliminary Report (25 @ 08:30):    Testing in progress    Culture - Tissue with Gram Stain (collected 25 @ 19:27)  Source: Tissue (5)Right Hip Fiscia #5  Gram Stain (25 @ 04:28):    No polymorphonuclear cells seen per low power field    No organisms seen per oil power field    Culture - Acid Fast - Tissue w/Smear (collected 25 @ 19:27)  Source: Tissue (4)Right Hip Fiscia #4    Culture - Fungal, Tissue (collected 25 @ 19:27)  Source: Tissue (4)Right Hip Fiscia #4  Preliminary Report (25 @ 08:30):    Testing in progress    Culture - Tissue with Gram Stain (collected 25 @ 19:27)  Source: Tissue (4)Right Hip Fiscia #4  Gram Stain (25 @ 04:29):    No polymorphonuclear cells seen per low power field    No organisms seen per oil power field    Culture - Fungal, Tissue (collected 25 @ 19:27)  Source: Tissue (3)Right Hip Fiscia #3  Preliminary Report (25 @ 08:30):    Testing in progress    Culture - Tissue with Gram Stain (collected 25 @ 19:27)  Source: Tissue (3)Right Hip Fiscia #3  Gram Stain (25 @ 04:28):    No polymorphonuclear cells seen per low power field    No organisms seen per oil power field    Culture - Fungal, Tissue (collected 25 @ 19:27)  Source: Tissue (2)Right Hip Fiscia #2  Preliminary Report (25 @ 08:30):    Testing in progress    Culture - Tissue with Gram Stain (collected 25 @ 19:27)  Source: Tissue (2)Right Hip Fiscia #2  Gram Stain (25 @ 04:28):    No polymorphonuclear cells seen per low power field    No organisms seen per oil power field    Culture - Acid Fast - Tissue w/Smear (collected 25 @ 19:27)  Source: Tissue Right Hip Fiscia    Culture - Fungal, Tissue (collected 25 @ 19:27)  Source: Tissue Right Hip Fiscia  Preliminary Report (25 @ 08:30):    Testing in progress    Culture - Tissue with Gram Stain (collected 25 @ 19:27)  Source: Tissue Right Hip Fiscia  Gram Stain (25 @ 04:28):    No polymorphonuclear cells seen per low power field    No organisms seen per oil power field

## 2025-02-21 NOTE — EEG REPORT - NS EEG TEXT BOX
[facility] Department of Neurology  Inpatient Continuous video-Electroencephalogram      Patient Name:	MICHAEL READ    :	1950  MRN:	[id number]    Study Start Date/Time:	2025, 4:50:15 AM  Study End Date/Time:2025, 8:44 AM    Referred by:  Dr Almanzar    Brief Clinical History:  MICHAEL READ is a 74 year old Female;with hx of lung cancer with mets to brain, stage 4 CKD, admitted for right hip dislocation sx and in pacu had tonic clonic seizure study performed to investigate for seizures or markers of epilepsy.   Technologist notes: -  Diagnosis Code:  R56.9 convulsions/seizure    Pertinent Medication:  Keppra 1gr bid    Acquisition Details:  Electroencephalography was acquired using a minimum of 21 channels on an emaze Neurology system v 9.3.1 with electrode placement according to the standard International 10-20 system following ACNS (American Clinical Neurophysiology Society) guidelines.  Anterior temporal T1 and T2 electrodes were utilized whenever possible.  The XLTEK automated spike & seizure detections were all reviewed in detail, in addition to the entire raw EEG.    Findings:  Day 1:  2025, 4:50:15 AM to same day at 08:44 AM   Meds: Keppra 1gr bid  Background:  continuous, with predominantly alpha/theta frequencies.  Generalized Slowing:  None  Symmetry/Focality: No persistent asymmetries of voltage or frequency.       Voltage:  Normal (20+ uV)  Organization:  Appropriate anterior-posterior gradient  Posterior Dominant Rhythm:  7 Hz poorly regulated  Sleep:  Symmetric, synchronous spindles and K complexes.  Variability:   Yes		Reactivity:  Yes    Spontaneous Activity:  No epileptiform discharges     Events:  1)No electrographic seizures or significant clinical events occurred during this study.  Provocations:  •	Hyperventilation: was not performed.  •	Photic stimulation: was not performed.  Daily Summary:    1)Mild generalized slowing indicating similar degree of diffuse cerebral dysfunction  2)There were no findings of active epilepsy, however this alone does not rule out the diagnosis.       Grazyna Cabral MD  CNP Fellow

## 2025-02-22 LAB
ANION GAP SERPL CALC-SCNC: 11 MMOL/L — SIGNIFICANT CHANGE UP (ref 5–17)
BUN SERPL-MCNC: 44 MG/DL — HIGH (ref 7–23)
CALCIUM SERPL-MCNC: 8.8 MG/DL — SIGNIFICANT CHANGE UP (ref 8.4–10.5)
CHLORIDE SERPL-SCNC: 105 MMOL/L — SIGNIFICANT CHANGE UP (ref 96–108)
CO2 SERPL-SCNC: 21 MMOL/L — LOW (ref 22–31)
CREAT SERPL-MCNC: 1.52 MG/DL — HIGH (ref 0.5–1.3)
EGFR: 36 ML/MIN/1.73M2 — LOW
GLUCOSE SERPL-MCNC: 143 MG/DL — HIGH (ref 70–99)
HCT VFR BLD CALC: 23.1 % — LOW (ref 34.5–45)
HGB BLD-MCNC: 7.4 G/DL — LOW (ref 11.5–15.5)
MAGNESIUM SERPL-MCNC: 1.9 MG/DL — SIGNIFICANT CHANGE UP (ref 1.6–2.6)
MCHC RBC-ENTMCNC: 27.6 PG — SIGNIFICANT CHANGE UP (ref 27–34)
MCHC RBC-ENTMCNC: 32 G/DL — SIGNIFICANT CHANGE UP (ref 32–36)
MCV RBC AUTO: 86.2 FL — SIGNIFICANT CHANGE UP (ref 80–100)
NRBC BLD AUTO-RTO: 0 /100 WBCS — SIGNIFICANT CHANGE UP (ref 0–0)
PHOSPHATE SERPL-MCNC: 3.8 MG/DL — SIGNIFICANT CHANGE UP (ref 2.5–4.5)
PLATELET # BLD AUTO: 183 K/UL — SIGNIFICANT CHANGE UP (ref 150–400)
POTASSIUM SERPL-MCNC: 3.8 MMOL/L — SIGNIFICANT CHANGE UP (ref 3.5–5.3)
POTASSIUM SERPL-SCNC: 3.8 MMOL/L — SIGNIFICANT CHANGE UP (ref 3.5–5.3)
RBC # BLD: 2.68 M/UL — LOW (ref 3.8–5.2)
RBC # FLD: 16.8 % — HIGH (ref 10.3–14.5)
SODIUM SERPL-SCNC: 137 MMOL/L — SIGNIFICANT CHANGE UP (ref 135–145)
WBC # BLD: 15.64 K/UL — HIGH (ref 3.8–10.5)
WBC # FLD AUTO: 15.64 K/UL — HIGH (ref 3.8–10.5)

## 2025-02-22 PROCEDURE — 99232 SBSQ HOSP IP/OBS MODERATE 35: CPT | Mod: GC

## 2025-02-22 PROCEDURE — 99232 SBSQ HOSP IP/OBS MODERATE 35: CPT

## 2025-02-22 PROCEDURE — 95718 EEG PHYS/QHP 2-12 HR W/VEEG: CPT

## 2025-02-22 RX ORDER — MAGNESIUM SULFATE 500 MG/ML
1 SYRINGE (ML) INJECTION ONCE
Refills: 0 | Status: COMPLETED | OUTPATIENT
Start: 2025-02-22 | End: 2025-02-22

## 2025-02-22 RX ORDER — LEVETIRACETAM 10 MG/ML
500 INJECTION, SOLUTION INTRAVENOUS EVERY 12 HOURS
Refills: 0 | Status: DISCONTINUED | OUTPATIENT
Start: 2025-02-22 | End: 2025-02-23

## 2025-02-22 RX ADMIN — LEVETIRACETAM 400 MILLIGRAM(S): 10 INJECTION, SOLUTION INTRAVENOUS at 18:08

## 2025-02-22 RX ADMIN — Medication 0.25 MILLIGRAM(S): at 16:52

## 2025-02-22 RX ADMIN — Medication 100 GRAM(S): at 09:07

## 2025-02-22 RX ADMIN — Medication 1000 MILLIGRAM(S): at 04:00

## 2025-02-22 RX ADMIN — Medication 1 APPLICATION(S): at 06:04

## 2025-02-22 RX ADMIN — Medication 100 MILLIEQUIVALENT(S): at 11:38

## 2025-02-22 RX ADMIN — Medication 400 MILLIGRAM(S): at 03:36

## 2025-02-22 RX ADMIN — SODIUM CHLORIDE 100 MILLILITER(S): 9 INJECTION, SOLUTION INTRAVENOUS at 03:35

## 2025-02-22 RX ADMIN — LEVETIRACETAM 400 MILLIGRAM(S): 10 INJECTION, SOLUTION INTRAVENOUS at 03:35

## 2025-02-22 RX ADMIN — SODIUM CHLORIDE 100 MILLILITER(S): 9 INJECTION, SOLUTION INTRAVENOUS at 18:09

## 2025-02-22 RX ADMIN — Medication 0.25 MILLIGRAM(S): at 22:30

## 2025-02-22 RX ADMIN — Medication 300 MILLIGRAM(S): at 12:37

## 2025-02-22 RX ADMIN — Medication 0.25 MILLIGRAM(S): at 17:24

## 2025-02-22 RX ADMIN — Medication 10 MILLIGRAM(S): at 18:51

## 2025-02-22 RX ADMIN — Medication 0.25 MILLIGRAM(S): at 21:44

## 2025-02-22 RX ADMIN — Medication 100 MILLIEQUIVALENT(S): at 10:57

## 2025-02-22 NOTE — PROGRESS NOTE ADULT - ASSESSMENT
74 year old female with history of  lung cancer (w/ mets to brain), Stage 4 CKD, HLD, DM2, RA (follows with Dr. Johnston), hx of RLE DVT (off Eliquis d/t frequent falls), R hip replacement with multiple recent dislocations (1/13/25 with closed reduction done at that time), admitted pre-op 2/18 for medical optimization, now s/p minimally invasive right hip replacement on 2/20 complicated by tonic clonic seizure in PACU, transferred to SICU for altered mental status, now s/p CT scan, concern for stroke vs motion artifact pending further work up including EEG and MRI.     NEURO: A&Ox2 with AMS, ?Stroke vs seizure; CT Head 2/20 possible anterior infarct vs motion artifact, repeat stable. Levetiracetam 500mg q12. Pain: standing Acetaminophen IV, Dilaudid PRN for severe pain. zofran prn; spinal anesthesia 3mL Bupivacine intra-thecal and 2 lidocaine 4% patches post-op. Known left cerebellar chronic infarcation, chronic lacunar infarcts. Hx of dementia/forgetfulness/2ndary to brain mets.  Epilepsy consulted, pending head MRI with and w/o contrast   CV: MAPs >65; hx of HTN, holding Nifedipine 90mg q24; HLD 40mg Atorvastatin. Cont Hydral 10q6 PRN SBP >160  PULM: Sating well on RA. 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 strict I&Os; UA 2/21 with granular casts. CKD (baseline Cr 1.2- 2); known cystocele and bladder prolapsed below the pubic symphysis   ENDO: ISS; Hx of diabetes on Glargine and lispro SSI on floor; A1c 6.8 (10.3%);   MSK: hx of RA - 2mg Decadron during flares   ID: Cefazolin x2 doses. UA clear, Hx of recurrent GNR UTIs with MDR. Has had VRE sensitive to ampicillin (10/2024) in the past as well Klebsiella sensitive to CTX (07/2024)  PPX: SCDs; started on aspirin suppository; Hx of RLE DVT (off eliquis due to recurrent falls)  LINES: PIVs  WOUNDS/DRAINS: right thigh prevena on incision  PT/OT: Ordered  Dispo: SICU 74 year old female with history of  lung cancer (w/ mets to brain), Stage 4 CKD, HLD, DM2, RA (follows with Dr. Johnston), hx of RLE DVT (off Eliquis d/t frequent falls), R hip replacement with multiple recent dislocations (1/13/25 with closed reduction done at that time), admitted pre-op 2/18 for medical optimization, now s/p minimally invasive right hip replacement on 2/20 complicated by tonic clonic seizure in PACU, transferred to SICU for altered mental status, now s/p CT scan, concern for stroke vs motion artifact pending further work up including EEG and MRI.     NEURO: A&Ox2 with AMS, ?Stroke vs seizure; CT Head 2/20 possible anterior infarct vs motion artifact, repeat stable. Levetiracetam 500mg q12. Pain: standing Acetaminophen IV, Dilaudid PRN for severe pain. zofran prn; spinal anesthesia 3mL Bupivacine intra-thecal and 2 lidocaine 4% patches post-op. Known left cerebellar chronic infarcation, chronic lacunar infarcts. Hx of dementia/forgetfulness/2ndary to brain mets.  Epilepsy consulted, pending head MRI with and w/o contrast   CV: MAPs >65; hx of HTN, holding Nifedipine 90mg q24; HLD 40mg Atorvastatin. Cont Hydral 10q6 PRN SBP >160  PULM: Sating well on RA. 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 strict I&Os; UA 2/21 with granular casts. CKD (baseline Cr 1.2- 2); known cystocele and bladder prolapsed below the pubic symphysis   ENDO: ISS; Hx of diabetes on Glargine and lispro SSI on floor; A1c 6.8 (10.3%);   MSK: hx of RA - 2mg Decadron during flares   ID: Cefazolin x2 doses. UA clear, Hx of recurrent GNR UTIs with MDR. Has had VRE sensitive to ampicillin (10/2024) in the past as well Klebsiella sensitive to CTX (07/2024)  PPX: SCDs; started on aspirin suppository; Hx of RLE DVT (off eliquis due to recurrent falls)  LINES: PIVs  WOUNDS/DRAINS: right thigh prevena on incision  PT/OT: Ordered  Dispo: SICU, SDU when tolerating diet

## 2025-02-22 NOTE — PROGRESS NOTE ADULT - SUBJECTIVE AND OBJECTIVE BOX
Overnight events: Given 0.5 mg IV Ativan for agitation. EEG revealing general slowing but without epileptiform activity   Subjective: Pt intermittently falling asleep on exam but arousable. Able to state her name, location, and year accurately but continues to be lethargic on exam. Denies pain       MEDICATIONS    acetaminophen   IVPB .. 1000 milliGRAM(s) IV Intermittent every 6 hours  aspirin Suppository 300 milliGRAM(s) Rectal daily  chlorhexidine 2% Cloths 1 Application(s) Topical <User Schedule>  dextrose 5%. 1000 milliLiter(s) IV Continuous <Continuous>  dextrose 50% Injectable 25 Gram(s) IV Push once  dextrose 50% Injectable 25 Gram(s) IV Push once  glucagon  Injectable 1 milliGRAM(s) IntraMuscular once  glucagon  Injectable 1 milliGRAM(s) IntraMuscular once  hydrALAZINE Injectable 10 milliGRAM(s) IV Push every 6 hours PRN  HYDROmorphone  Injectable 0.2 milliGRAM(s) IV Push every 4 hours PRN  insulin lispro (ADMELOG) corrective regimen sliding scale   SubCutaneous every 6 hours  lactated ringers. 1000 milliLiter(s) IV Continuous <Continuous>  levETIRAcetam   Injectable 1000 milliGRAM(s) IV Push every 12 hours  ondansetron Injectable 4 milliGRAM(s) IV Push every 6 hours PRN        Vital Signs Last 24 Hrs  T(C): 35.3 (2025 14:50), Max: 36.8 (2025 01:55)  T(F): 95.5 (2025 14:50), Max: 98.2 (2025 01:55)  HR: 66 (2025 14:00) (56 - 88)  BP: 166/79 (2025 14:00) (99/54 - 195/76)  BP(mean): 113 (2025 14:00) (72 - 116)  RR: 24 (2025 14:00) (8 - 31)  SpO2: 99% (2025 14:00) (96% - 100%)    Parameters below as of 2025 14:00  Patient On (Oxygen Delivery Method): room air          REVIEW OF SYSTEMS:    Constitutional: No fever, chills, fatigue, weakness  Eyes: no eye pain, visual disturbances, or discharge  ENT:  No difficulty hearing, tinnitus, vertigo; No sinus or throat pain  Neck: No pain or stiffness  Respiratory: No cough, dyspnea, wheezing   Cardiovascular: No chest pain, palpitations,   Gastrointestinal: No abdominal or epigastric pain. No nausea, vomiting  No diarrhea or constipation.   Genitourinary: No dysuria, frequency, hematuria or incontinence  Neurological: No headaches, lightheadedness, vertigo, numbness or tremors  Psychiatric: No depression, anxiety, mood swings or difficulty sleeping  Musculoskeletal: No joint pain or swelling; No muscle, back or extremity pain  Skin: No itching, burning, rashes or lesions   Lymph Nodes: No enlarged glands  Endocrine: No heat or cold intolerance; No hair loss, No h/o diabetes or thyroid dysfunction  Allergy and Immunologic: No hives or eczema    On Neurological Examination:    Mental Status - Patient is awake, oriented x0. Confused, repetitively saying yes to all questions.    Follows very simple commands but unable to answer questions appropriately    Speech -   Fluent                       Cranial Nerves - Pupils 2 mm equal and reactive to light  Unable to assess extraocular movement    Motor Exam  Right upper - Squeezes hand, unable to move against gravity when prompted. Movement with noxious stimuli.  Left upper - Squeezes hand, unable to move against gravity when prompted. Movement with noxious stimuli.  Right lower - No movement when prompted. Movement with noxious stimuli  Left lower  - No movement when prompted. Movement with noxious stimuli.    Muscle tone - is normal all over. No asymmetry is seen.    Sensory    Unclear if she has intact sensation, patient says she has sensation whether you touch her or not.    Gait -  Unable to assess     GENERAL Exam:     Nontoxic  		  LUNGS:	Clear bilaterally  No Wheeze  Decreased bilaterally  	  HEART:	 Normal S1S2   No murmur RRR        	  GI/ ABDOMEN:  Soft  Non tender    EXTREMITIES:   No Edema  No Clubbing  No Cyanosis No Edema    MUSCULOSKELETAL: Unable to assess range of motion. No tenderness on palpation.  	   SKIN:      Normal   No Ecchymosis        LABS:  CBC Full  -  ( 2025 05:23 )  WBC Count : 16.87 K/uL  RBC Count : 3.07 M/uL  Hemoglobin : 8.5 g/dL  Hematocrit : 26.7 %  Platelet Count - Automated : 183 K/uL  Mean Cell Volume : 87.0 fl  Mean Cell Hemoglobin : 27.7 pg  Mean Cell Hemoglobin Concentration : 31.8 g/dL  Auto Neutrophil # : x  Auto Lymphocyte # : x  Auto Monocyte # : x  Auto Eosinophil # : x  Auto Basophil # : x  Auto Neutrophil % : x  Auto Lymphocyte % : x  Auto Monocyte % : x  Auto Eosinophil % : x  Auto Basophil % : x    Urinalysis Basic - ( 2025 07:44 )    Color: Yellow / Appearance: Clear / S.022 / pH: x  Gluc: x / Ketone: Trace mg/dL  / Bili: Negative / Urobili: 0.2 mg/dL   Blood: x / Protein: 300 mg/dL / Nitrite: Negative   Leuk Esterase: Trace / RBC: 1 /HPF / WBC 4 /HPF   Sq Epi: x / Non Sq Epi: 4 /HPF / Bacteria: Negative /HPF          142  |  107  |  47[H]  ----------------------------<  246[H]  4.4   |  18[L]  |  1.80[H]    Ca    8.6      2025 05:23  Phos  5.3       Mg     1.6           Hemoglobin A1C:       Vitamin B12   PT/INR - ( 2025 05:23 )   PT: 12.6 sec;   INR: 1.08          PTT - ( 2025 05:23 )  PTT:27.4 sec      RADIOLOGY    EKG

## 2025-02-22 NOTE — PROVIDER CONTACT NOTE (OTHER) - ACTION/TREATMENT ORDERED:
Labetalol to be ordered. Pending orders. Hold on BP meds as per resident since patient is asymptomatic

## 2025-02-22 NOTE — PROGRESS NOTE ADULT - ASSESSMENT
74F PMHx of known stage 4 NSCLC with brain metastases on diagnosis s/p carboplatin, taxol, and pembrolizumab with partial response but continued disease progression in the brain, liver, and lungs. Initially presented for hip arthroplasty and then suspected to have seizure activity in the PACU. Epilepsy was consulted for management of seizures in the setting of brain metastases. Patient likely had some form of seizures which would not be surprising given the clear predisposition with brain metastases and prior radiotherapy to the CNS. Additionally she had a high lactate which peaked at 10.0 which has now resolved. CT Head done at this time was concerning for possible acute infarction within the right frontal lobe, however prior notes have mentioned a possible brain metastasis to the right frontal lobe (0.9cm), and surveillance CT later 2/21 revealed only moderate chronic microvascular changes w/o concern for ischemia. EEG currently demonstrating general slowing but without overt epileptiform activity     - Pending MRI with and without IV contrast to assess for possible brain metastasis at the site of suspected acute infarction  - Decrease IV Keppra to 500 mg twice a day  - Continue vEEG    Discussed with attending Dr. Gilbert

## 2025-02-22 NOTE — PROGRESS NOTE ADULT - ATTENDING COMMENTS
74F PMHx of known stage 4 NSCLC with brain metastases on initially presented for hip arthroplasty and then suspected to have seizure activity in the PACU.   EEG without seizure activity  WIll reduce  Keppra to 500mg BID to minimize potential sedation  MRI brain pending
Patient admitted to SICU earlier today by Dr. Mojica  EEG in place on [facility]

## 2025-02-22 NOTE — PROGRESS NOTE ADULT - SUBJECTIVE AND OBJECTIVE BOX
INTERVAL HPI/OVERNIGHT EVENTS:  Awake and alert   Knew me;  No dysphagia evaluation  yet   No seizures   On Kera      MEDICATIONS  (STANDING):  aspirin Suppository 300 milliGRAM(s) Rectal daily  chlorhexidine 2% Cloths 1 Application(s) Topical <User Schedule>  dextrose 5%. 1000 milliLiter(s) (100 mL/Hr) IV Continuous <Continuous>  dextrose 50% Injectable 25 Gram(s) IV Push once  dextrose 50% Injectable 25 Gram(s) IV Push once  glucagon  Injectable 1 milliGRAM(s) IntraMuscular once  glucagon  Injectable 1 milliGRAM(s) IntraMuscular once  insulin lispro (ADMELOG) corrective regimen sliding scale   SubCutaneous every 6 hours  lactated ringers. 1000 milliLiter(s) (100 mL/Hr) IV Continuous <Continuous>  levETIRAcetam  IVPB 500 milliGRAM(s) IV Intermittent every 12 hours    MEDICATIONS  (PRN):  hydrALAZINE Injectable 10 milliGRAM(s) IV Push every 6 hours PRN SBP >160  HYDROmorphone  Injectable 0.25 milliGRAM(s) IV Push every 3 hours PRN Severe Pain (7 - 10)  ondansetron Injectable 4 milliGRAM(s) IV Push every 6 hours PRN Nausea and/or Vomiting      Allergies    citrus (Urticaria)  strawberry (Pruritus)  Bactrim (Rash)    Intolerances        Vital Signs Last 24 Hrs  T(C): 36.6 (22 Feb 2025 10:25), Max: 37 (21 Feb 2025 21:32)  T(F): 97.9 (22 Feb 2025 10:25), Max: 98.6 (21 Feb 2025 21:32)  HR: 94 (22 Feb 2025 13:00) (56 - 102)  BP: 179/89 (22 Feb 2025 13:00) (124/70 - 179/89)  BP(mean): 126 (22 Feb 2025 13:00) (85 - 126)  RR: 18 (22 Feb 2025 13:00) (13 - 26)  SpO2: 98% (22 Feb 2025 13:00) (93% - 100%)    Parameters below as of 22 Feb 2025 13:00  Patient On (Oxygen Delivery Method): room air              Constitutional:  Awake     Eyes: NEDRA    ENMT: Negative    Neck: Supple    Back:  no tenderness     Respiratory:  clear     Cardiovascular: S1 S2    Gastrointestinal:  soft     Genitourinary:    Extremities:  edema     Vascular:    Neurological:    Skin:    Lymph Nodes:            02-21 @ 07:01  -  02-22 @ 07:00  --------------------------------------------------------  IN: 2385 mL / OUT: 810 mL / NET: 1575 mL      LABS:                        7.4    15.64 )-----------( 183      ( 22 Feb 2025 05:20 )             23.1     02-22    137  |  105  |  44[H]  ----------------------------<  143[H]  3.8   |  21[L]  |  1.52[H]    Ca    8.8      22 Feb 2025 05:20  Phos  3.8     02-22  Mg     1.9     02-22      PT/INR - ( 21 Feb 2025 05:23 )   PT: 12.6 sec;   INR: 1.08          PTT - ( 21 Feb 2025 05:23 )  PTT:27.4 sec  Urinalysis Basic - ( 22 Feb 2025 05:20 )    Color: x / Appearance: x / SG: x / pH: x  Gluc: 143 mg/dL / Ketone: x  / Bili: x / Urobili: x   Blood: x / Protein: x / Nitrite: x   Leuk Esterase: x / RBC: x / WBC x   Sq Epi: x / Non Sq Epi: x / Bacteria: x        RADIOLOGY & ADDITIONAL TESTS:

## 2025-02-22 NOTE — PHYSICAL THERAPY INITIAL EVALUATION ADULT - PERTINENT HX OF CURRENT PROBLEM, REHAB EVAL
74 year old female with history of  lung cancer (w/ mets to brain), CKD, HLD, DM2, RA (follows with Dr. Johnston), RLE DVT (off Eliquis d/t frequent falls), R hip replacement with multiple recent dislocations(1/13/25 with closed reduction done at that time), sent by Dr. Almanzar for admission for R hip surgery. States that she has been unable to walk since December due to continued R hip dislocation. She is currently residing at [facility] and notes she has not been walking since she got there due to the dislocation. Denies current pain at the joint site and denies recent falls since her hospitalization. Went to Dr. Almanzar's office today for follow up appointment and had XR imaging showing R hip dislocation--decision made to undergo R hip surgery. Denies recent illness, f/c, chest pain, sob, cough, n/v, dysuria.

## 2025-02-22 NOTE — EEG REPORT - NS EEG TEXT BOX
[facility] Department of Neurology  Inpatient Continuous video-Electroencephalogram      Patient Name:	MICHAEL READ    :	1950  MRN:	[id number]    Study Start Date/Time:	2025, 4:50:15 AM  Study End Date/Time:2025, 8:44 AM    Referred by:  Dr Almanzar    Brief Clinical History:  MICHAEL READ is a 74 year old Female;with hx of lung cancer with mets to brain, stage 4 CKD, admitted for right hip dislocation sx and in pacu had tonic clonic seizure study performed to investigate for seizures or markers of epilepsy.   Diagnosis Code:  R56.9 convulsions/seizure      Day 2:  2025, 8:44 AM to same day at 4:14 PM and from 6:04 AM on  till 7 AM  Meds: Keppra 750 mg bid  Background:  continuous, with predominantly alpha/theta frequencies.  Generalized Slowing:  None  Symmetry/Focality: No persistent asymmetries of voltage or frequency.       Voltage:  Normal (20+ uV)  Organization:  Appropriate anterior-posterior gradient  Posterior Dominant Rhythm:  7-8 Hz poorly regulated  Sleep:  Symmetric, synchronous spindles and K complexes.  Variability:   Yes		Reactivity:  Yes    Spontaneous Activity:  No epileptiform discharges     Events:  1-No electrographic seizures or significant clinical events occurred during this study.  Provocations:  •	Hyperventilation: was not performed.  •	Photic stimulation: was not performed.  Daily Summary:    1-Mild generalized slowing indicating similar degree of diffuse cerebral dysfunction  2-There were no findings of active epilepsy, however this alone does not rule out the diagnosis.       Grazyna Cabral MD  CNP Fellow

## 2025-02-22 NOTE — PHYSICAL THERAPY INITIAL EVALUATION ADULT - ADDITIONAL COMMENTS
As per intake, pt presents from sub acute rehab. Pt lives alone in elevator building. Pt was ambulating with a RW pta

## 2025-02-22 NOTE — PROGRESS NOTE ADULT - SUBJECTIVE AND OBJECTIVE BOX
INTERVAL/OVERNIGHT EVENTS: Repeat head CT without acute infarction or hemorrhage. Straight cath for 400cc pending TOV. EEG with mild generalized slowing, no active epilepsy thusfar. Keppra dose decreased per Epilepsy note.    SUBJECTIVE: Patient seen this am at bedside. Subjective exam limited secondary to AMS, denies pain or trouble breathing.      Neurologic Medications  aspirin Suppository 300 milliGRAM(s) Rectal daily  HYDROmorphone  Injectable 0.25 milliGRAM(s) IV Push every 3 hours PRN Severe Pain (7 - 10)  levETIRAcetam  IVPB 500 milliGRAM(s) IV Intermittent every 12 hours  ondansetron Injectable 4 milliGRAM(s) IV Push every 6 hours PRN Nausea and/or Vomiting    Cardiovascular Medications  hydrALAZINE Injectable 10 milliGRAM(s) IV Push every 6 hours PRN SBP >160    Gastrointestinal Medications  dextrose 5%. 1000 milliLiter(s) IV Continuous <Continuous>  lactated ringers. 1000 milliLiter(s) IV Continuous <Continuous>    Endocrine/Metabolic Medications  dextrose 50% Injectable 25 Gram(s) IV Push once  dextrose 50% Injectable 25 Gram(s) IV Push once  glucagon  Injectable 1 milliGRAM(s) IntraMuscular once  glucagon  Injectable 1 milliGRAM(s) IntraMuscular once  insulin lispro (ADMELOG) corrective regimen sliding scale   SubCutaneous every 6 hours    Topical/Other Medications  chlorhexidine 2% Cloths 1 Application(s) Topical <User Schedule>      MEDICATIONS  (PRN):  hydrALAZINE Injectable 10 milliGRAM(s) IV Push every 6 hours PRN SBP >160  HYDROmorphone  Injectable 0.25 milliGRAM(s) IV Push every 3 hours PRN Severe Pain (7 - 10)  ondansetron Injectable 4 milliGRAM(s) IV Push every 6 hours PRN Nausea and/or Vomiting      I&O's Detail    21 Feb 2025 07:01  -  22 Feb 2025 07:00  --------------------------------------------------------  IN:    IV PiggyBack: 285 mL    Lactated Ringers: 2100 mL  Total IN: 2385 mL    OUT:    Indwelling Catheter - Urethral (mL): 410 mL    Intermittent Catheterization - Urethral (mL): 400 mL  Total OUT: 810 mL    Total NET: 1575 mL          Vital Signs Last 24 Hrs  T(C): 36.6 (22 Feb 2025 10:25), Max: 37 (21 Feb 2025 21:32)  T(F): 97.9 (22 Feb 2025 10:25), Max: 98.6 (21 Feb 2025 21:32)  HR: 94 (22 Feb 2025 13:00) (56 - 102)  BP: 179/89 (22 Feb 2025 13:00) (124/70 - 179/89)  BP(mean): 126 (22 Feb 2025 13:00) (85 - 126)  RR: 18 (22 Feb 2025 13:00) (13 - 26)  SpO2: 98% (22 Feb 2025 13:00) (93% - 100%)    Parameters below as of 22 Feb 2025 13:00  Patient On (Oxygen Delivery Method): room air        GENERAL: NAD, resting comfortably in bed, AOx2  HEENT: NCAT, MMM, PERRL, EEG leads in place  C/V: Normal rate, regular rhythm without murmurs, rubs, or gallops.   PULM: Nonlabored breathing, no respiratory distress, lungs clear to auscultation bilaterally  ABD: Soft, ND, NT, no rebound tenderness, no guarding  EXTREM: WWP, no edema, SCDs in place  NEURO: No focal deficits      LABS:                        7.4    15.64 )-----------( 183      ( 22 Feb 2025 05:20 )             23.1     02-22    137  |  105  |  44[H]  ----------------------------<  143[H]  3.8   |  21[L]  |  1.52[H]    Ca    8.8      22 Feb 2025 05:20  Phos  3.8     02-22  Mg     1.9     02-22      PT/INR - ( 21 Feb 2025 05:23 )   PT: 12.6 sec;   INR: 1.08          PTT - ( 21 Feb 2025 05:23 )  PTT:27.4 sec  Urinalysis Basic - ( 22 Feb 2025 05:20 )    Color: x / Appearance: x / SG: x / pH: x  Gluc: 143 mg/dL / Ketone: x  / Bili: x / Urobili: x   Blood: x / Protein: x / Nitrite: x   Leuk Esterase: x / RBC: x / WBC x   Sq Epi: x / Non Sq Epi: x / Bacteria: x        RADIOLOGY & ADDITIONAL STUDIES:      Culture - Acid Fast - Tissue w/Smear (collected 02-20-25 @ 19:27)  Source: Tissue (5)Right Hip Fiscia #5    Culture - Acid Fast - Tissue w/Smear (collected 02-20-25 @ 19:27)  Source: Tissue (6)Right Hip Femur #6    Culture - Acid Fast - Tissue w/Smear (collected 02-20-25 @ 19:27)  Source: Tissue (2)Right Hip Fiscia #2    Culture - Fungal, Tissue (collected 02-20-25 @ 19:27)  Source: Tissue (3)Right Hip Fiscia #3  Preliminary Report (02-22-25 @ 08:25):    No growth    Culture - Acid Fast - Tissue w/Smear (collected 02-20-25 @ 19:27)  Source: Tissue (3)Right Hip Fiscia #3    Culture - Acid Fast - Tissue w/Smear (collected 02-20-25 @ 19:27)  Source: Tissue (4)Right Hip Fiscia #4    Culture - Acid Fast - Tissue w/Smear (collected 02-20-25 @ 19:27)  Source: Tissue Right Hip Fiscia    Culture - Fungal, Tissue (collected 02-20-25 @ 19:27)  Source: Tissue (4)Right Hip Fiscia #4  Preliminary Report (02-22-25 @ 08:23):    No growth    Culture - Fungal, Tissue (collected 02-20-25 @ 19:27)  Source: Tissue (5)Right Hip Fiscia #5  Preliminary Report (02-22-25 @ 08:24):    No growth    Culture - Tissue with Gram Stain (collected 02-20-25 @ 19:27)  Source: Tissue (6)Right Hip Femur #6  Gram Stain (02-21-25 @ 04:29):    No polymorphonuclear cells seen per low power field    No organisms seen per oil power field  Preliminary Report (02-22-25 @ 09:30):    No growth to date.    Culture - Fungal, Tissue (collected 02-20-25 @ 19:27)  Source: Tissue (6)Right Hip Femur #6  Preliminary Report (02-22-25 @ 08:27):    No growth    Culture - Fungal, Tissue (collected 02-20-25 @ 19:27)  Source: Tissue Right Hip Fiscia  Preliminary Report (02-22-25 @ 08:27):    No growth    Culture - Fungal, Tissue (collected 02-20-25 @ 19:27)  Source: Tissue (2)Right Hip Fiscia #2  Preliminary Report (02-22-25 @ 08:27):    No growth    Culture - Tissue with Gram Stain (collected 02-20-25 @ 19:27)  Source: Tissue Right Hip Fiscia  Gram Stain (02-21-25 @ 04:28):    No polymorphonuclear cells seen per low power field    No organisms seen per oil power field  Preliminary Report (02-22-25 @ 09:28):    No growth to date.    Culture - Tissue with Gram Stain (collected 02-20-25 @ 19:27)  Source: Tissue (2)Right Hip Fiscia #2  Gram Stain (02-21-25 @ 04:28):    No polymorphonuclear cells seen per low power field    No organisms seen per oil power field  Preliminary Report (02-22-25 @ 09:29):    No growth to date.    Culture - Tissue with Gram Stain (collected 02-20-25 @ 19:27)  Source: Tissue (3)Right Hip Fiscia #3  Gram Stain (02-21-25 @ 04:28):    No polymorphonuclear cells seen per low power field    No organisms seen per oil power field  Preliminary Report (02-22-25 @ 09:29):    No growth to date.    Culture - Tissue with Gram Stain (collected 02-20-25 @ 19:27)  Source: Tissue (4)Right Hip Fiscia #4  Gram Stain (02-21-25 @ 04:29):    No polymorphonuclear cells seen per low power field    No organisms seen per oil power field  Preliminary Report (02-22-25 @ 09:27):    No growth to date.    Culture - Tissue with Gram Stain (collected 02-20-25 @ 19:27)  Source: Tissue (5)Right Hip Fiscia #5  Gram Stain (02-21-25 @ 04:28):    No polymorphonuclear cells seen per low power field    No organisms seen per oil power field  Preliminary Report (02-22-25 @ 09:28):    No growth to date.

## 2025-02-22 NOTE — PROGRESS NOTE ADULT - SUBJECTIVE AND OBJECTIVE BOX
SUBJECTIVE: Pt seen and examined on morning rounds. EEG running, pt was interactive and able to respond to yes/no questions Pt denies cp/n/ha    Vital Signs Last 24 Hrs  T(C): 36.3 (22 Feb 2025 05:29), Max: 37 (21 Feb 2025 21:32)  T(F): 97.3 (22 Feb 2025 05:29), Max: 98.6 (21 Feb 2025 21:32)  HR: 56 (22 Feb 2025 08:00) (56 - 102)  BP: 153/67 (22 Feb 2025 08:00) (124/70 - 195/76)  BP(mean): 97 (22 Feb 2025 08:00) (85 - 116)  RR: 15 (22 Feb 2025 08:00) (13 - 26)  SpO2: 99% (22 Feb 2025 08:00) (93% - 100%)    Parameters below as of 22 Feb 2025 08:00  Patient On (Oxygen Delivery Method): room air        Physical Exam:  General: NAD, resting comfortably in bed  Ext:  abd pillow in place  SILT SPN/DPN/Saph/Alexandra/Tib  Motor +firing TA/GS/EHL/FHL, strength could not be assessed due to patient state  Pulses 2+ dp      Assessment/Plan:  74yF s/p R hip full cup revision by Dr. APARNA Almanzar on 02-20   - Weight Bearing Status: WBAT, PHP   - Pain control  - DVT PPx: ASA suppository  - PT rec: pending

## 2025-02-23 LAB
ANION GAP SERPL CALC-SCNC: 11 MMOL/L — SIGNIFICANT CHANGE UP (ref 5–17)
BUN SERPL-MCNC: 37 MG/DL — HIGH (ref 7–23)
CALCIUM SERPL-MCNC: 8.9 MG/DL — SIGNIFICANT CHANGE UP (ref 8.4–10.5)
CHLORIDE SERPL-SCNC: 111 MMOL/L — HIGH (ref 96–108)
CO2 SERPL-SCNC: 22 MMOL/L — SIGNIFICANT CHANGE UP (ref 22–31)
CREAT SERPL-MCNC: 1.4 MG/DL — HIGH (ref 0.5–1.3)
EGFR: 39 ML/MIN/1.73M2 — LOW
GLUCOSE SERPL-MCNC: 125 MG/DL — HIGH (ref 70–99)
HCT VFR BLD CALC: 28.1 % — LOW (ref 34.5–45)
HGB BLD-MCNC: 8.6 G/DL — LOW (ref 11.5–15.5)
MAGNESIUM SERPL-MCNC: 2.2 MG/DL — SIGNIFICANT CHANGE UP (ref 1.6–2.6)
MCHC RBC-ENTMCNC: 27 PG — SIGNIFICANT CHANGE UP (ref 27–34)
MCHC RBC-ENTMCNC: 30.6 G/DL — LOW (ref 32–36)
MCV RBC AUTO: 88.1 FL — SIGNIFICANT CHANGE UP (ref 80–100)
NRBC BLD AUTO-RTO: 0 /100 WBCS — SIGNIFICANT CHANGE UP (ref 0–0)
PHOSPHATE SERPL-MCNC: 2.4 MG/DL — LOW (ref 2.5–4.5)
PLATELET # BLD AUTO: 199 K/UL — SIGNIFICANT CHANGE UP (ref 150–400)
POTASSIUM SERPL-MCNC: 4.2 MMOL/L — SIGNIFICANT CHANGE UP (ref 3.5–5.3)
POTASSIUM SERPL-SCNC: 4.2 MMOL/L — SIGNIFICANT CHANGE UP (ref 3.5–5.3)
RBC # BLD: 3.19 M/UL — LOW (ref 3.8–5.2)
RBC # FLD: 17 % — HIGH (ref 10.3–14.5)
SODIUM SERPL-SCNC: 144 MMOL/L — SIGNIFICANT CHANGE UP (ref 135–145)
WBC # BLD: 11.43 K/UL — HIGH (ref 3.8–10.5)
WBC # FLD AUTO: 11.43 K/UL — HIGH (ref 3.8–10.5)

## 2025-02-23 PROCEDURE — 99232 SBSQ HOSP IP/OBS MODERATE 35: CPT

## 2025-02-23 PROCEDURE — 95720 EEG PHY/QHP EA INCR W/VEEG: CPT

## 2025-02-23 PROCEDURE — 70551 MRI BRAIN STEM W/O DYE: CPT | Mod: 26

## 2025-02-23 RX ORDER — LEVETIRACETAM 10 MG/ML
500 INJECTION, SOLUTION INTRAVENOUS
Refills: 0 | Status: DISCONTINUED | OUTPATIENT
Start: 2025-02-23 | End: 2025-02-25

## 2025-02-23 RX ORDER — ASPIRIN 325 MG
81 TABLET ORAL
Refills: 0 | Status: DISCONTINUED | OUTPATIENT
Start: 2025-02-23 | End: 2025-02-25

## 2025-02-23 RX ADMIN — Medication 0.25 MILLIGRAM(S): at 09:46

## 2025-02-23 RX ADMIN — Medication 0.25 MILLIGRAM(S): at 09:16

## 2025-02-23 RX ADMIN — Medication 81 MILLIGRAM(S): at 18:28

## 2025-02-23 RX ADMIN — LEVETIRACETAM 500 MILLIGRAM(S): 10 INJECTION, SOLUTION INTRAVENOUS at 18:28

## 2025-02-23 RX ADMIN — LEVETIRACETAM 400 MILLIGRAM(S): 10 INJECTION, SOLUTION INTRAVENOUS at 07:12

## 2025-02-23 RX ADMIN — SODIUM CHLORIDE 100 MILLILITER(S): 9 INJECTION, SOLUTION INTRAVENOUS at 06:09

## 2025-02-23 NOTE — OCCUPATIONAL THERAPY INITIAL EVALUATION ADULT - ADDITIONAL COMMENTS
Pt is a poor historian 2/2 mild confusion. Per chart pt was admitted from [facility], required RW for ambulation.

## 2025-02-23 NOTE — EEG REPORT - NS EEG TEXT BOX
[facility] Department of Neurology  Inpatient Continuous video-Electroencephalogram      Patient Name:	MICHAEL READ    :	1950  MRN:	[id number]    Study Start Date/Time:	2025, 4:50:15 AM  Study End Date/Time:2025, 8:44 AM    Referred by:  Dr Almanzar    Brief Clinical History:  MICHAEL READ is a 74 year old Female;with hx of lung cancer with mets to brain, stage 4 CKD, admitted for right hip dislocation sx and in pacu had tonic clonic seizure study performed to investigate for seizures or markers of epilepsy.         Day 3:  2025, 7 AM to next day at 7 AM  Meds: Keppra 500 mg bid  Background:  continuous, with predominantly alpha/theta frequencies.  Generalized Slowing:  None  Symmetry/Focality: No persistent asymmetries of voltage or frequency.       Voltage:  Normal (20+ uV)  Organization:  Appropriate anterior-posterior gradient  Posterior Dominant Rhythm:  8 Hz poorly regulated  Sleep:  Symmetric, synchronous spindles and K complexes.  Variability:   Yes		Reactivity:  Yes    Spontaneous Activity:  No epileptiform discharges     Events:  1-No electrographic seizures or significant clinical events occurred during this study.  Provocations:  •	Hyperventilation: was not performed.  •	Photic stimulation: was not performed.  Daily Summary:    1-Mild generalized slowing indicating similar degree of diffuse cerebral dysfunction  2-There were no findings of active epilepsy, however this alone does not rule out the diagnosis.       Grazyna Cabral MD  CNP Fellow

## 2025-02-23 NOTE — PROGRESS NOTE ADULT - SUBJECTIVE AND OBJECTIVE BOX
SUBJECTIVE: Pt seen and examined on morning rounds. EEG in place. NAEON    Vital Signs Last 24 Hrs  T(C): 36.2 (23 Feb 2025 00:40), Max: 37.1 (22 Feb 2025 21:00)  T(F): 97.2 (23 Feb 2025 00:40), Max: 98.8 (22 Feb 2025 21:00)  HR: 81 (23 Feb 2025 00:40) (56 - 100)  BP: 118/70 (23 Feb 2025 00:40) (118/70 - 182/77)  BP(mean): 111 (22 Feb 2025 18:00) (93 - 126)  RR: 18 (23 Feb 2025 00:40) (15 - 18)  SpO2: 97% (23 Feb 2025 00:40) (96% - 99%)    Parameters below as of 23 Feb 2025 00:40  Patient On (Oxygen Delivery Method): room air      Physical Exam:  General: NAD, resting comfortably in bed  Ext:  abd pillow in place  SILT SPN/DPN/Saph/Alexandra/Tib  Motor +firing TA/GS/EHL/FHL, strength could not be assessed due to patient state  Pulses 2+ dp      Assessment/Plan:  74yF s/p R hip full cup revision by Dr. APARNA Almanzar on 02-20   - Weight Bearing Status: WBAT, PHP   - Pain control  - DVT PPx: ASA suppository  - PT rec: SULMA  - SICU Co-Mgmt/Medical Co-Mgmt  - Neurology recs for seizure-like activity - no activity detected thus far on EEG         SUBJECTIVE: Pt seen and examined on morning rounds. EEG in place. NAEON    Vital Signs Last 24 Hrs  T(C): 36.2 (23 Feb 2025 00:40), Max: 37.1 (22 Feb 2025 21:00)  T(F): 97.2 (23 Feb 2025 00:40), Max: 98.8 (22 Feb 2025 21:00)  HR: 81 (23 Feb 2025 00:40) (56 - 100)  BP: 118/70 (23 Feb 2025 00:40) (118/70 - 182/77)  BP(mean): 111 (22 Feb 2025 18:00) (93 - 126)  RR: 18 (23 Feb 2025 00:40) (15 - 18)  SpO2: 97% (23 Feb 2025 00:40) (96% - 99%)    Parameters below as of 23 Feb 2025 00:40  Patient On (Oxygen Delivery Method): room air      Physical Exam:  General: NAD, resting comfortably in bed  R Hip  Prevena holding suction  abd pillow in place  SILT SPN/DPN/Saph/Alexandra/Tib  Motor +firing TA/GS/EHL/FHL, strength could not be assessed due to patient state  Pulses 2+ dp      Assessment/Plan:  74yF s/p R hip full cup revision by Dr. APARNA Almanzar on 02-20   - Weight Bearing Status: WBAT, PHP   - Pain control  - DVT PPx: ASA suppository  - PT rec: SULMA  - SICU Co-Mgmt/Medical Co-Mgmt  - Neurology recs for seizure-like activity - no activity detected thus far on EEG  - Pending MRI Brain today

## 2025-02-23 NOTE — OCCUPATIONAL THERAPY INITIAL EVALUATION ADULT - DIAGNOSIS, OT EVAL
Pt s/p R hip full cup revision on 2/20. Pt presents with impaired cognition, decreased strength, ROM, coordination, balance, impacting ability to perform ADL and mobility.

## 2025-02-23 NOTE — OCCUPATIONAL THERAPY INITIAL EVALUATION ADULT - GENERAL OBSERVATIONS, REHAB EVAL
Pt received semi-supine in bed +prevena plus +primafit +vEEG +tele +IV, in NAD and agreeable to OT. PT Alanna present t/o. Cleared by SOSA Calvin to see pt.

## 2025-02-23 NOTE — CHART NOTE - NSCHARTNOTEFT_GEN_A_CORE
Patient see and examined  Once eating resume usual insulin regimen and other meds Patient seen and examined  Once eating resume usual insulin regimen and other meds

## 2025-02-23 NOTE — OCCUPATIONAL THERAPY INITIAL EVALUATION ADULT - RANGE OF MOTION EXAMINATION, LOWER EXTREMITY
R hip not assess 2/2 hip precautions, R knee/Ankle AAROM WFL/Left LE Active ROM was WFL (within functional limits)

## 2025-02-23 NOTE — OCCUPATIONAL THERAPY INITIAL EVALUATION ADULT - MODIFIED CLINICAL TEST OF SENSORY INTEGRATION IN BALANCE TEST
Pt performed STS with maxAx2 +RW, pt attempted to take 1 step however pt unable to weight shift in standing.

## 2025-02-23 NOTE — PROGRESS NOTE ADULT - ASSESSMENT
74F PMHx of known stage 4 NSCLC with brain metastases on diagnosis s/p carboplatin, taxol, and pembrolizumab with partial response but continued disease progression in the brain, liver, and lungs. Initially presented for hip arthroplasty and then suspected to have seizure activity in the PACU. Epilepsy was consulted for management of seizures in the setting of brain metastases. Patient likely had some form of seizures which would not be surprising given the clear predisposition with brain metastases and prior radiotherapy to the CNS. Additionally she had a high lactate which peaked at 10.0 which has now resolved. CT Head done at this time was concerning for possible acute infarction within the right frontal lobe, however prior notes have mentioned a possible brain metastasis to the right frontal lobe (0.9cm), and surveillance CT later 2/21 revealed only moderate chronic microvascular changes w/o concern for ischemia. EEG currently demonstrating general slowing but without overt epileptiform activity     Recommendations:  - Pending MRI with and without IV contrast to assess for possible brain metastasis at the site of suspected acute infarction  - Continue IV Keppra 500 mg twice a day  - Continue vEEG monitoring

## 2025-02-23 NOTE — OCCUPATIONAL THERAPY INITIAL EVALUATION ADULT - PERTINENT HX OF CURRENT PROBLEM, REHAB EVAL
74 year old female with history of  lung cancer (w/ mets to brain), CKD, HLD, DM2, RA (follows with Dr. Johnston), RLE DVT (off Eliquis d/t frequent falls), R hip replacement with multiple recent dislocations(1/13/25 with closed reduction done at that time), sent by Dr. Almanzar for admission for R hip surgery. States that she has been unable to walk since December due to continued R hip dislocation. She is currently residing at [facility] and notes she has not been walking since she got there due to the dislocation. Denies current pain at the joint site and denies recent falls since her hospitalization. Went to Dr. Almanzar's office today for follow up appointment and had XR imaging showing R hip dislocation--decision made to undergo R hip surgery. Denies recent illness, f/c, chest pain, sob, cough, n/v, dysuria.

## 2025-02-23 NOTE — PROGRESS NOTE ADULT - SUBJECTIVE AND OBJECTIVE BOX
EPILEPSY PROGRESS NOTE:  Patient seen and examined at bedside, and report that she wanted to get some more without any further complaints. No seizures or seizure-like events reported overnight.     REVIEW OF SYSTEMS:  Limited due to patient report of needing additional rest, but denies any complaints.    MEDICATIONS:  aspirin Suppository 300 milliGRAM(s) Rectal daily  chlorhexidine 2% Cloths 1 Application(s) Topical <User Schedule>  dextrose 5%. 1000 milliLiter(s) (100 mL/Hr) IV Continuous <Continuous>  dextrose 50% Injectable 25 Gram(s) IV Push once  dextrose 50% Injectable 25 Gram(s) IV Push once  glucagon  Injectable 1 milliGRAM(s) IntraMuscular once  glucagon  Injectable 1 milliGRAM(s) IntraMuscular once  insulin lispro (ADMELOG) corrective regimen sliding scale   SubCutaneous every 6 hours  lactated ringers. 1000 milliLiter(s) (100 mL/Hr) IV Continuous <Continuous>  levETIRAcetam  IVPB 500 milliGRAM(s) IV Intermittent every 12 hours    MEDICATIONS  (PRN):  hydrALAZINE Injectable 10 milliGRAM(s) IV Push every 6 hours PRN SBP >160  HYDROmorphone  Injectable 0.25 milliGRAM(s) IV Push every 3 hours PRN Severe Pain (7 - 10)  ondansetron Injectable 4 milliGRAM(s) IV Push every 6 hours PRN Nausea and/or Vomiting    VITAL SIGNS:  T(C): 37.1 (02-23-25 @ 06:00), Max: 37.1 (02-22-25 @ 21:00)  HR: 98 (02-23-25 @ 06:00) (56 - 100)  BP: 155/70 (02-23-25 @ 06:00) (118/70 - 182/77)  RR: 18 (02-23-25 @ 06:00) (16 - 18)  SpO2: 98% (02-23-25 @ 06:00) (96% - 99%)  Wt(kg): --    PHYSICAL EXAM:  Neurologic:     -Mental Status: Alert and oriented to name, place and date. Speech is fluent but limited due to patient request to rest, but noted no dysarthria or aphasia. Follow commands.      -Cranial Nerves:          II: Visual fields are full to confrontation.          III, IV, VI: EOMI without nystagmus. PERRLA 3mm brisk b/l          V:  Facial sensation V1-V3 equal and intact           VII: Face is symmetric with normal eye closure and smile          VIII: Hearing is bilaterally intact to finger rub          IX, X: Uvula is midline and soft palate rises symmetrically          XI: Head turning and shoulder shrug are intact.          XII: Tongue protrudes midline     -Motor: Moves bilateral upper extremities at least 4/5 and B/L LE limited, bilateral toe wiggle but patient did not wish to continue with further evaluation.      -Sensation: Not assessed     -Coordination: Not assessed     -Reflexes: Not assessed     -Gait: Deferred     LABS:  CBC Full  -  ( 23 Feb 2025 05:30 )  WBC Count : 11.43 K/uL  RBC Count : 3.19 M/uL  Hemoglobin : 8.6 g/dL  Hematocrit : 28.1 %  Platelet Count - Automated : 199 K/uL  Mean Cell Volume : 88.1 fl  Mean Cell Hemoglobin : 27.0 pg  Mean Cell Hemoglobin Concentration : 30.6 g/dL  Auto Neutrophil # : x  Auto Lymphocyte # : x  Auto Monocyte # : x  Auto Eosinophil # : x  Auto Basophil # : x  Auto Neutrophil % : x  Auto Lymphocyte % : x  Auto Monocyte % : x  Auto Eosinophil % : x  Auto Basophil % : x    02-23    144  |  111[H]  |  37[H]  ----------------------------<  125[H]  4.2   |  22  |  1.40[H]    Ca    8.9      23 Feb 2025 05:30  Phos  2.4     02-23  Mg     2.2     02-23    EEG Daily Summary 2/22/25:    1-Mild generalized slowing indicating similar degree of diffuse cerebral dysfunction  2-There were no findings of active epilepsy, however this alone does not rule out the diagnosis.     Grazyna Cabral MD  CNP Fellow

## 2025-02-24 PROCEDURE — 95720 EEG PHY/QHP EA INCR W/VEEG: CPT

## 2025-02-24 PROCEDURE — 99232 SBSQ HOSP IP/OBS MODERATE 35: CPT | Mod: GC

## 2025-02-24 PROCEDURE — 99232 SBSQ HOSP IP/OBS MODERATE 35: CPT

## 2025-02-24 RX ORDER — INSULIN GLARGINE-YFGN 100 [IU]/ML
10 INJECTION, SOLUTION SUBCUTANEOUS AT BEDTIME
Refills: 0 | Status: DISCONTINUED | OUTPATIENT
Start: 2025-02-24 | End: 2025-02-25

## 2025-02-24 RX ORDER — OXYCODONE HYDROCHLORIDE 30 MG/1
2.5 TABLET ORAL EVERY 4 HOURS
Refills: 0 | Status: DISCONTINUED | OUTPATIENT
Start: 2025-02-24 | End: 2025-02-25

## 2025-02-24 RX ORDER — NIFEDIPINE 30 MG
60 TABLET, EXTENDED RELEASE 24 HR ORAL EVERY 24 HOURS
Refills: 0 | Status: DISCONTINUED | OUTPATIENT
Start: 2025-02-24 | End: 2025-02-25

## 2025-02-24 RX ORDER — OXYCODONE HYDROCHLORIDE 30 MG/1
5 TABLET ORAL EVERY 4 HOURS
Refills: 0 | Status: DISCONTINUED | OUTPATIENT
Start: 2025-02-24 | End: 2025-02-25

## 2025-02-24 RX ORDER — INSULIN LISPRO 100 U/ML
INJECTION, SOLUTION INTRAVENOUS; SUBCUTANEOUS
Refills: 0 | Status: DISCONTINUED | OUTPATIENT
Start: 2025-02-24 | End: 2025-02-25

## 2025-02-24 RX ADMIN — INSULIN LISPRO 1: 100 INJECTION, SOLUTION INTRAVENOUS; SUBCUTANEOUS at 18:17

## 2025-02-24 RX ADMIN — Medication 81 MILLIGRAM(S): at 18:21

## 2025-02-24 RX ADMIN — INSULIN LISPRO 2: 100 INJECTION, SOLUTION INTRAVENOUS; SUBCUTANEOUS at 22:49

## 2025-02-24 RX ADMIN — Medication 81 MILLIGRAM(S): at 05:56

## 2025-02-24 RX ADMIN — LEVETIRACETAM 500 MILLIGRAM(S): 10 INJECTION, SOLUTION INTRAVENOUS at 18:20

## 2025-02-24 RX ADMIN — Medication 60 MILLIGRAM(S): at 11:02

## 2025-02-24 RX ADMIN — Medication 20 MILLIGRAM(S): at 06:32

## 2025-02-24 RX ADMIN — INSULIN GLARGINE-YFGN 10 UNIT(S): 100 INJECTION, SOLUTION SUBCUTANEOUS at 22:50

## 2025-02-24 RX ADMIN — OXYCODONE HYDROCHLORIDE 5 MILLIGRAM(S): 30 TABLET ORAL at 07:48

## 2025-02-24 RX ADMIN — INSULIN LISPRO 1: 100 INJECTION, SOLUTION INTRAVENOUS; SUBCUTANEOUS at 06:12

## 2025-02-24 RX ADMIN — OXYCODONE HYDROCHLORIDE 5 MILLIGRAM(S): 30 TABLET ORAL at 08:48

## 2025-02-24 RX ADMIN — INSULIN LISPRO 1: 100 INJECTION, SOLUTION INTRAVENOUS; SUBCUTANEOUS at 12:28

## 2025-02-24 RX ADMIN — LEVETIRACETAM 500 MILLIGRAM(S): 10 INJECTION, SOLUTION INTRAVENOUS at 05:56

## 2025-02-24 NOTE — PROGRESS NOTE ADULT - SUBJECTIVE AND OBJECTIVE BOX
EPILEPSY PROGRESS NOTE:  No events overnight. No complaints currently.    REVIEW OF SYSTEMS:  As above, otherwise negative for constitutional/HEENT/CV/pulm/GI//MSK/neuro/derm/endocrine/psych.    MEDICATIONS:   MEDICATIONS  (STANDING):  aspirin  chewable 81 milliGRAM(s) Oral two times a day  chlorhexidine 2% Cloths 1 Application(s) Topical <User Schedule>  dextrose 5%. 1000 milliLiter(s) (100 mL/Hr) IV Continuous <Continuous>  dextrose 50% Injectable 25 Gram(s) IV Push once  dextrose 50% Injectable 25 Gram(s) IV Push once  glucagon  Injectable 1 milliGRAM(s) IntraMuscular once  glucagon  Injectable 1 milliGRAM(s) IntraMuscular once  insulin glargine Injectable (LANTUS) 10 Unit(s) SubCutaneous at bedtime  insulin lispro (ADMELOG) corrective regimen sliding scale   SubCutaneous every 6 hours  lactated ringers. 1000 milliLiter(s) (100 mL/Hr) IV Continuous <Continuous>  levETIRAcetam 500 milliGRAM(s) Oral two times a day    MEDICATIONS  (PRN):  hydrALAZINE Injectable 10 milliGRAM(s) IV Push every 6 hours PRN SBP >160  ondansetron Injectable 4 milliGRAM(s) IV Push every 6 hours PRN Nausea and/or Vomiting  oxyCODONE    IR 2.5 milliGRAM(s) Oral every 4 hours PRN Moderate Pain (4 - 6)  oxyCODONE    IR 5 milliGRAM(s) Oral every 4 hours PRN Severe Pain (7 - 10)      VITAL SIGNS:  T(C): 36.9 (02-24-25 @ 09:25), Max: 37.3 (02-23-25 @ 20:36)  HR: 87 (02-24-25 @ 09:25) (61 - 96)  BP: 169/75 (02-24-25 @ 09:25) (123/58 - 180/72)  RR: 18 (02-24-25 @ 09:25) (18 - 19)  SpO2: 95% (02-24-25 @ 09:25) (95% - 99%)  Wt(kg): --    PHYSICAL EXAM:  Eyes open spontaneously. Conversational with appropriate sentences.  EOMI. Visual fields full. PERRL 3>2. Face symmetrical.  Full strength throughout.  Finger-nose-finger intact R/L.  Intact to light touch throughout.    LABS:  CBC Full  -  ( 23 Feb 2025 05:30 )  WBC Count : 11.43 K/uL  RBC Count : 3.19 M/uL  Hemoglobin : 8.6 g/dL  Hematocrit : 28.1 %  Platelet Count - Automated : 199 K/uL  Mean Cell Volume : 88.1 fl  Mean Cell Hemoglobin : 27.0 pg  Mean Cell Hemoglobin Concentration : 30.6 g/dL  Auto Neutrophil # : x  Auto Lymphocyte # : x  Auto Monocyte # : x  Auto Eosinophil # : x  Auto Basophil # : x  Auto Neutrophil % : x  Auto Lymphocyte % : x  Auto Monocyte % : x  Auto Eosinophil % : x  Auto Basophil % : x    02-23    144  |  111[H]  |  37[H]  ----------------------------<  125[H]  4.2   |  22  |  1.40[H]    Ca    8.9      23 Feb 2025 05:30  Phos  2.4     02-23  Mg     2.2     02-23              EEG: EPILEPSY PROGRESS NOTE:  No events overnight. No complaints currently. EEG without epileptiform activity or seizures overnight.    REVIEW OF SYSTEMS:  As above, otherwise negative for constitutional/HEENT/CV/pulm/GI//MSK/neuro/derm/endocrine/psych.    MEDICATIONS:   MEDICATIONS  (STANDING):  aspirin  chewable 81 milliGRAM(s) Oral two times a day  chlorhexidine 2% Cloths 1 Application(s) Topical <User Schedule>  dextrose 5%. 1000 milliLiter(s) (100 mL/Hr) IV Continuous <Continuous>  dextrose 50% Injectable 25 Gram(s) IV Push once  dextrose 50% Injectable 25 Gram(s) IV Push once  glucagon  Injectable 1 milliGRAM(s) IntraMuscular once  glucagon  Injectable 1 milliGRAM(s) IntraMuscular once  insulin glargine Injectable (LANTUS) 10 Unit(s) SubCutaneous at bedtime  insulin lispro (ADMELOG) corrective regimen sliding scale   SubCutaneous every 6 hours  lactated ringers. 1000 milliLiter(s) (100 mL/Hr) IV Continuous <Continuous>  levETIRAcetam 500 milliGRAM(s) Oral two times a day    MEDICATIONS  (PRN):  hydrALAZINE Injectable 10 milliGRAM(s) IV Push every 6 hours PRN SBP >160  ondansetron Injectable 4 milliGRAM(s) IV Push every 6 hours PRN Nausea and/or Vomiting  oxyCODONE    IR 2.5 milliGRAM(s) Oral every 4 hours PRN Moderate Pain (4 - 6)  oxyCODONE    IR 5 milliGRAM(s) Oral every 4 hours PRN Severe Pain (7 - 10)      VITAL SIGNS:  T(C): 36.9 (02-24-25 @ 09:25), Max: 37.3 (02-23-25 @ 20:36)  HR: 87 (02-24-25 @ 09:25) (61 - 96)  BP: 169/75 (02-24-25 @ 09:25) (123/58 - 180/72)  RR: 18 (02-24-25 @ 09:25) (18 - 19)  SpO2: 95% (02-24-25 @ 09:25) (95% - 99%)  Wt(kg): --    PHYSICAL EXAM:  Eyes open spontaneously. Conversational with appropriate sentences.  EOMI. Visual fields full. PERRL 3>2. Face symmetrical.  Full strength throughout.  Finger-nose-finger intact R/L.  Intact to light touch throughout.    LABS:  CBC Full  -  ( 23 Feb 2025 05:30 )  WBC Count : 11.43 K/uL  RBC Count : 3.19 M/uL  Hemoglobin : 8.6 g/dL  Hematocrit : 28.1 %  Platelet Count - Automated : 199 K/uL  Mean Cell Volume : 88.1 fl  Mean Cell Hemoglobin : 27.0 pg  Mean Cell Hemoglobin Concentration : 30.6 g/dL  Auto Neutrophil # : x  Auto Lymphocyte # : x  Auto Monocyte # : x  Auto Eosinophil # : x  Auto Basophil # : x  Auto Neutrophil % : x  Auto Lymphocyte % : x  Auto Monocyte % : x  Auto Eosinophil % : x  Auto Basophil % : x    02-23    144  |  111[H]  |  37[H]  ----------------------------<  125[H]  4.2   |  22  |  1.40[H]    Ca    8.9      23 Feb 2025 05:30  Phos  2.4     02-23  Mg     2.2     02-23          EEG:  no seizures or epileptiform activity EPILEPSY PROGRESS NOTE:  No events overnight. No complaints currently. EEG without epileptiform activity or seizures overnight.    REVIEW OF SYSTEMS:  As above, otherwise negative for constitutional/HEENT/CV/pulm/GI//MSK/neuro/derm/endocrine/psych.    MEDICATIONS:   MEDICATIONS  (STANDING):  aspirin  chewable 81 milliGRAM(s) Oral two times a day  chlorhexidine 2% Cloths 1 Application(s) Topical <User Schedule>  dextrose 5%. 1000 milliLiter(s) (100 mL/Hr) IV Continuous <Continuous>  dextrose 50% Injectable 25 Gram(s) IV Push once  dextrose 50% Injectable 25 Gram(s) IV Push once  glucagon  Injectable 1 milliGRAM(s) IntraMuscular once  glucagon  Injectable 1 milliGRAM(s) IntraMuscular once  insulin glargine Injectable (LANTUS) 10 Unit(s) SubCutaneous at bedtime  insulin lispro (ADMELOG) corrective regimen sliding scale   SubCutaneous every 6 hours  lactated ringers. 1000 milliLiter(s) (100 mL/Hr) IV Continuous <Continuous>  levETIRAcetam 500 milliGRAM(s) Oral two times a day    MEDICATIONS  (PRN):  hydrALAZINE Injectable 10 milliGRAM(s) IV Push every 6 hours PRN SBP >160  ondansetron Injectable 4 milliGRAM(s) IV Push every 6 hours PRN Nausea and/or Vomiting  oxyCODONE    IR 2.5 milliGRAM(s) Oral every 4 hours PRN Moderate Pain (4 - 6)  oxyCODONE    IR 5 milliGRAM(s) Oral every 4 hours PRN Severe Pain (7 - 10)      VITAL SIGNS:  T(C): 36.9 (02-24-25 @ 09:25), Max: 37.3 (02-23-25 @ 20:36)  HR: 87 (02-24-25 @ 09:25) (61 - 96)  BP: 169/75 (02-24-25 @ 09:25) (123/58 - 180/72)  RR: 18 (02-24-25 @ 09:25) (18 - 19)  SpO2: 95% (02-24-25 @ 09:25) (95% - 99%)  Wt(kg): --    PHYSICAL EXAM:  AAOx 3, follows 3 step commands, naming intact  Eyes open spontaneously. Conversational with appropriate sentences.  EOMI. Visual fields full. PERRL 3>2. 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  Intact to light touch throughout.    LABS:  CBC Full  -  ( 23 Feb 2025 05:30 )  WBC Count : 11.43 K/uL  RBC Count : 3.19 M/uL  Hemoglobin : 8.6 g/dL  Hematocrit : 28.1 %  Platelet Count - Automated : 199 K/uL  Mean Cell Volume : 88.1 fl  Mean Cell Hemoglobin : 27.0 pg  Mean Cell Hemoglobin Concentration : 30.6 g/dL  Auto Neutrophil # : x  Auto Lymphocyte # : x  Auto Monocyte # : x  Auto Eosinophil # : x  Auto Basophil # : x  Auto Neutrophil % : x  Auto Lymphocyte % : x  Auto Monocyte % : x  Auto Eosinophil % : x  Auto Basophil % : x    02-23    144  |  111[H]  |  37[H]  ----------------------------<  125[H]  4.2   |  22  |  1.40[H]    Ca    8.9      23 Feb 2025 05:30  Phos  2.4     02-23  Mg     2.2     02-23          EEG:  no seizures or epileptiform activity

## 2025-02-24 NOTE — PROGRESS NOTE ADULT - ASSESSMENT
74F PMHx of known stage 4 NSCLC with brain metastases on diagnosis s/p carboplatin, taxol, and pembrolizumab with partial response but continued disease progression in the brain, liver, and lungs. Initially presented for hip arthroplasty and then suspected to have seizure activity in the PACU. Epilepsy was consulted for management of seizures in the setting of brain metastases.     Patient likely had some form of seizure which would not be surprising given the clear predisposition with brain metastases, prior radiotherapy to the CNS and chronic multifocal infarcts. Additionally she had a high lactate which peaked at 10.0 which has now resolved. CT Head done at this time was concerning for possible acute infarction within the right frontal lobe, however prior notes have mentioned a possible brain metastasis to the right frontal lobe (0.9cm), and surveillance CT later 2/21 revealed only moderate chronic microvascular changes w/o concern for ischemia. EEG currently demonstrating general slowing but without overt epileptiform activity.     Pt underwent MRI which severe motion degradation, at the very least acute infarct was ruled out; however, the possibility of new or increasing brain mets cannot be fully elucidated. Reached out to hem/onc team  (Dr. Mac) and made aware in the event that they wish to obtain an MRI w/ [facility] outpatient (last MRI w/ [facility]alia was 10/2024 which showed decreased brain met size).    Recommendations:  -  - Continue Keppra 500 mg twice a day (equal iv-oral conversion)  - D/C VEEG today  - WIll arrange outpatient Epilepsy f/u ([phone])

## 2025-02-24 NOTE — PROGRESS NOTE ADULT - SUBJECTIVE AND OBJECTIVE BOX
SUBJECTIVE: Pt seen and examined on morning rounds. NAEON    Vital Signs Last 24 Hrs  T(C): 36.2 (23 Feb 2025 00:40), Max: 37.1 (22 Feb 2025 21:00)  T(F): 97.2 (23 Feb 2025 00:40), Max: 98.8 (22 Feb 2025 21:00)  HR: 81 (23 Feb 2025 00:40) (56 - 100)  BP: 118/70 (23 Feb 2025 00:40) (118/70 - 182/77)  BP(mean): 111 (22 Feb 2025 18:00) (93 - 126)  RR: 18 (23 Feb 2025 00:40) (15 - 18)  SpO2: 97% (23 Feb 2025 00:40) (96% - 99%)    Parameters below as of 23 Feb 2025 00:40  Patient On (Oxygen Delivery Method): room air      Physical Exam:  General: NAD, resting comfortably in bed  R Hip  Prevena holding suction  abd pillow in place  SILT SPN/DPN/Saph/Alexandra/Tib  Motor +firing TA/GS/EHL/FHL, strength could not be assessed due to patient state  Pulses 2+ dp      Assessment/Plan:  74yF s/p R hip full cup revision by Dr. APARNA Almanzar on 02-20   - Weight Bearing Status: WBAT, PHP   - Pain control  - DVT PPx: ASA   - PT rec: [facility]  - SICU Co-Mgmt/Medical Co-Mgmt  - Neurology recs for seizure-like activity - no activity detected thus far on EEG

## 2025-02-24 NOTE — PROGRESS NOTE ADULT - SUBJECTIVE AND OBJECTIVE BOX
Orthopaedic Surgery Progress Note    Patient seen this AM, doing well. Connected to EEG. Patient comfortable without complaints, pain controlled.    Vital Signs Last 24 Hrs  T(C): 36.9 (02-24-25 @ 09:25), Max: 37.1 (02-24-25 @ 05:05)  T(F): 98.5 (02-24-25 @ 09:25), Max: 98.7 (02-24-25 @ 05:05)  HR: 87 (02-24-25 @ 09:25) (80 - 87)  BP: 169/75 (02-24-25 @ 09:25) (169/75 - 180/72)  BP(mean): 106 (02-24-25 @ 09:25) (106 - 106)  RR: 18 (02-24-25 @ 09:25) (18 - 18)  SpO2: 95% (02-24-25 @ 09:25) (95% - 99%)      Sitting in bed, awake and alert  Mental status much improved closer to baseline   Right hip prevena clean/dry/intact holding suction                           8.6    11.43 )-----------( 199      ( 23 Feb 2025 05:30 )             28.1   02-23    144  |  111[H]  |  37[H]  ----------------------------<  125[H]  4.2   |  22  |  1.40[H]    Ca    8.9      23 Feb 2025 05:30  Phos  2.4     02-23  Mg     2.2     02-23        A/P: 74yFemale with Right hip replacement with multiple recent dislocations, now status post revision R THR 2/20, course complicated by seizure like activity  -Pain control as needed  -DVT prophylaxis: ASA  -PT/weight-bearing status: WBAT   -Appreciate medicine comanagement recommendations Dr. Douglass: Per Dr. Douglass, mental status much improved, at baseline. Lantus resumed per Dr. Douglass. BP has been elevated, per Dr. Douglass ok to resume home nifedipine dose, will start at 60mg daily and titrate back to home 90 dose as BP tolerated. Patient has been refusing IV placement over the weekend, IV meds transitioned to PO over the weekend. Per Dr. Douglass, ok for no IV access for now. Had MRI head done without contrast however epilepsy team recommending study with contrast - patient refused IV placement for contrast. Epilepsy team will review case and follow up if MRI w/ contrast needed during admission or can follow up outpatient. Appreciate further epilepsy recommendations, patient currently on EEG, likely can d/c today.   -Dispo: [facility]ULMA once medically optimized

## 2025-02-24 NOTE — PROGRESS NOTE ADULT - SUBJECTIVE AND OBJECTIVE BOX
INTERVAL HPI/OVERNIGHT EVENTS:  Awake a      MEDICATIONS  (STANDING):  aspirin  chewable 81 milliGRAM(s) Oral two times a day  chlorhexidine 2% Cloths 1 Application(s) Topical <User Schedule>  dextrose 5%. 1000 milliLiter(s) (100 mL/Hr) IV Continuous <Continuous>  dextrose 50% Injectable 25 Gram(s) IV Push once  dextrose 50% Injectable 25 Gram(s) IV Push once  glucagon  Injectable 1 milliGRAM(s) IntraMuscular once  glucagon  Injectable 1 milliGRAM(s) IntraMuscular once  insulin lispro (ADMELOG) corrective regimen sliding scale   SubCutaneous every 6 hours  lactated ringers. 1000 milliLiter(s) (100 mL/Hr) IV Continuous <Continuous>  levETIRAcetam 500 milliGRAM(s) Oral two times a day    MEDICATIONS  (PRN):  hydrALAZINE Injectable 10 milliGRAM(s) IV Push every 6 hours PRN SBP >160  ondansetron Injectable 4 milliGRAM(s) IV Push every 6 hours PRN Nausea and/or Vomiting  oxyCODONE    IR 2.5 milliGRAM(s) Oral every 4 hours PRN Moderate Pain (4 - 6)  oxyCODONE    IR 5 milliGRAM(s) Oral every 4 hours PRN Severe Pain (7 - 10)      Allergies    citrus (Urticaria)  strawberry (Pruritus)  Bactrim (Rash)    Intolerances        Vital Signs Last 24 Hrs  T(C): 37.1 (24 Feb 2025 05:05), Max: 37.3 (23 Feb 2025 20:36)  T(F): 98.7 (24 Feb 2025 05:05), Max: 99.1 (23 Feb 2025 20:36)  HR: 82 (24 Feb 2025 06:25) (61 - 104)  BP: 180/72 (24 Feb 2025 06:25) (123/58 - 180/72)  BP(mean): --  RR: 18 (24 Feb 2025 05:05) (18 - 19)  SpO2: 99% (24 Feb 2025 05:05) (95% - 99%)    Parameters below as of 24 Feb 2025 05:05  Patient On (Oxygen Delivery Method): room air              Constitutional:    Eyes: NEDRA    ENMT: Negative    Neck: Supple    Back:  no tenderness     Respiratory:      Cardiovascular: S1 S2    Gastrointestinal:    Genitourinary:    Extremities:    Vascular:    Neurological:    Skin:    Lymph Nodes:            02-23 @ 07:01  -  02-24 @ 07:00  --------------------------------------------------------  IN: 100 mL / OUT: 1100 mL / NET: -1000 mL      LABS:                        8.6    11.43 )-----------( 199      ( 23 Feb 2025 05:30 )             28.1     02-23    144  |  111[H]  |  37[H]  ----------------------------<  125[H]  4.2   |  22  |  1.40[H]    Ca    8.9      23 Feb 2025 05:30  Phos  2.4     02-23  Mg     2.2     02-23        Urinalysis Basic - ( 23 Feb 2025 05:30 )    Color: x / Appearance: x / SG: x / pH: x  Gluc: 125 mg/dL / Ketone: x  / Bili: x / Urobili: x   Blood: x / Protein: x / Nitrite: x   Leuk Esterase: x / RBC: x / WBC x   Sq Epi: x / Non Sq Epi: x / Bacteria: x        RADIOLOGY & ADDITIONAL TESTS:   INTERVAL HPI/OVERNIGHT EVENTS:  Awake and alert;  Back to baseline  Eating breakfast during visit    MEDICATIONS  (STANDING):  aspirin  chewable 81 milliGRAM(s) Oral two times a day  chlorhexidine 2% Cloths 1 Application(s) Topical <User Schedule>  dextrose 5%. 1000 milliLiter(s) (100 mL/Hr) IV Continuous <Continuous>  dextrose 50% Injectable 25 Gram(s) IV Push once  dextrose 50% Injectable 25 Gram(s) IV Push once  glucagon  Injectable 1 milliGRAM(s) IntraMuscular once  glucagon  Injectable 1 milliGRAM(s) IntraMuscular once  insulin lispro (ADMELOG) corrective regimen sliding scale   SubCutaneous every 6 hours  lactated ringers. 1000 milliLiter(s) (100 mL/Hr) IV Continuous <Continuous>  levETIRAcetam 500 milliGRAM(s) Oral two times a day    MEDICATIONS  (PRN):  hydrALAZINE Injectable 10 milliGRAM(s) IV Push every 6 hours PRN SBP >160  ondansetron Injectable 4 milliGRAM(s) IV Push every 6 hours PRN Nausea and/or Vomiting  oxyCODONE    IR 2.5 milliGRAM(s) Oral every 4 hours PRN Moderate Pain (4 - 6)  oxyCODONE    IR 5 milliGRAM(s) Oral every 4 hours PRN Severe Pain (7 - 10)      Allergies    citrus (Urticaria)  strawberry (Pruritus)  Bactrim (Rash)    Intolerances        Vital Signs Last 24 Hrs  T(C): 37.1 (24 Feb 2025 05:05), Max: 37.3 (23 Feb 2025 20:36)  T(F): 98.7 (24 Feb 2025 05:05), Max: 99.1 (23 Feb 2025 20:36)  HR: 82 (24 Feb 2025 06:25) (61 - 104)  BP: 180/72 (24 Feb 2025 06:25) (123/58 - 180/72)  BP(mean): --  RR: 18 (24 Feb 2025 05:05) (18 - 19)  SpO2: 99% (24 Feb 2025 05:05) (95% - 99%)    Parameters below as of 24 Feb 2025 05:05  Patient On (Oxygen Delivery Method): room air              Constitutional:  Awake     Eyes: NEDRA    ENMT: Negative    Neck: Supple    Back:  no tenderness     Respiratory:  clear    Cardiovascular: S1 S2    Gastrointestinal:  soft     Genitourinary:    Extremities:  no edema     Vascular:    Neurological:    Skin:    Lymph Nodes:            02-23 @ 07:01  -  02-24 @ 07:00  --------------------------------------------------------  IN: 100 mL / OUT: 1100 mL / NET: -1000 mL      LABS:                        8.6    11.43 )-----------( 199      ( 23 Feb 2025 05:30 )             28.1     02-23    144  |  111[H]  |  37[H]  ----------------------------<  125[H]  4.2   |  22  |  1.40[H]    Ca    8.9      23 Feb 2025 05:30  Phos  2.4     02-23  Mg     2.2     02-23        Urinalysis Basic - ( 23 Feb 2025 05:30 )    Color: x / Appearance: x / SG: x / pH: x  Gluc: 125 mg/dL / Ketone: x  / Bili: x / Urobili: x   Blood: x / Protein: x / Nitrite: x   Leuk Esterase: x / RBC: x / WBC x   Sq Epi: x / Non Sq Epi: x / Bacteria: x        RADIOLOGY & ADDITIONAL TESTS:

## 2025-02-24 NOTE — EEG REPORT - NS EEG TEXT BOX
[facility] Department of Neurology  Inpatient Continuous video-Electroencephalogram      Patient Name:	MICHAEL READ    :	1950  MRN:	[id number]    Study Start Date/Time:	2025, 4:50:15 AM  Study End Date/Time: in progress    Referred by:  Dr Almanzar    Brief Clinical History:  MICHAEL READ is a 74 year old Female;with hx of lung cancer with mets to brain, stage 4 CKD, admitted for right hip dislocation sx and in pacu had tonic clonic seizure study performed to investigate for seizures or markers of epilepsy.   Technologist notes: -  Diagnosis Code:  R56.9 convulsions/seizure    Pertinent Medication:  Keppra 1gr bid    Acquisition Details:  Electroencephalography was acquired using a minimum of 21 channels on an [employer] Neurology system v 9.3.1 with electrode placement according to the standard International 10-20 system following ACNS (American Clinical Neurophysiology Society) guidelines.  Anterior temporal T1 and T2 electrodes were utilized whenever possible.  The [employer] automated spike & seizure detections were all reviewed in detail, in addition to the entire raw EEG.    Day 4:  2025, 7 AM to next day at 7 AM  Meds: Keppra 500 mg bid  Background:  continuous, with predominantly alpha/theta frequencies.  Generalized Slowing:  None  Symmetry/Focality: No persistent asymmetries of voltage or frequency.       Voltage:  Normal (20+ uV)  Organization:  Appropriate anterior-posterior gradient  Posterior Dominant Rhythm:  9 Hz poorly regulated  Sleep:  Symmetric, synchronous spindles and K complexes.  Variability:   Yes		Reactivity:  Yes    Spontaneous Activity:  No epileptiform discharges     Events:  1)	No electrographic seizures or significant clinical events occurred during this study.  Provocations:  •	Hyperventilation: was not performed.  •	Photic stimulation: was not performed.  Daily Summary:    1.Mild generalized slowing indicating similar degree of diffuse cerebral dysfunction  2.There were no findings of active epilepsy, however this alone does not rule out the diagnosis      Shazia Veloz DO  Attending Neurologist, [facility] Epilepsy Program

## 2025-02-25 ENCOUNTER — TRANSCRIPTION ENCOUNTER (OUTPATIENT)
Age: 75
End: 2025-02-25

## 2025-02-25 ENCOUNTER — NON-APPOINTMENT (OUTPATIENT)
Age: 75
End: 2025-02-25

## 2025-02-25 VITALS
OXYGEN SATURATION: 98 % | SYSTOLIC BLOOD PRESSURE: 165 MMHG | HEART RATE: 84 BPM | DIASTOLIC BLOOD PRESSURE: 79 MMHG | RESPIRATION RATE: 20 BRPM | TEMPERATURE: 98 F

## 2025-02-25 PROCEDURE — 72170 X-RAY EXAM OF PELVIS: CPT

## 2025-02-25 PROCEDURE — 95705 EEG W/O VID 2-12 HR UNMNTR: CPT

## 2025-02-25 PROCEDURE — 83735 ASSAY OF MAGNESIUM: CPT

## 2025-02-25 PROCEDURE — A9585: CPT

## 2025-02-25 PROCEDURE — 70450 CT HEAD/BRAIN W/O DYE: CPT | Mod: MC

## 2025-02-25 PROCEDURE — 87102 FUNGUS ISOLATION CULTURE: CPT

## 2025-02-25 PROCEDURE — 82570 ASSAY OF URINE CREATININE: CPT

## 2025-02-25 PROCEDURE — 36415 COLL VENOUS BLD VENIPUNCTURE: CPT

## 2025-02-25 PROCEDURE — 85730 THROMBOPLASTIN TIME PARTIAL: CPT

## 2025-02-25 PROCEDURE — 82803 BLOOD GASES ANY COMBINATION: CPT

## 2025-02-25 PROCEDURE — 97161 PT EVAL LOW COMPLEX 20 MIN: CPT

## 2025-02-25 PROCEDURE — 99232 SBSQ HOSP IP/OBS MODERATE 35: CPT | Mod: GC

## 2025-02-25 PROCEDURE — 83605 ASSAY OF LACTIC ACID: CPT

## 2025-02-25 PROCEDURE — 97165 OT EVAL LOW COMPLEX 30 MIN: CPT

## 2025-02-25 PROCEDURE — 93970 EXTREMITY STUDY: CPT

## 2025-02-25 PROCEDURE — 99285 EMERGENCY DEPT VISIT HI MDM: CPT

## 2025-02-25 PROCEDURE — 84295 ASSAY OF SERUM SODIUM: CPT

## 2025-02-25 PROCEDURE — 95700 EEG CONT REC W/VID EEG TECH: CPT

## 2025-02-25 PROCEDURE — 86900 BLOOD TYPING SEROLOGIC ABO: CPT

## 2025-02-25 PROCEDURE — 86850 RBC ANTIBODY SCREEN: CPT

## 2025-02-25 PROCEDURE — 86901 BLOOD TYPING SEROLOGIC RH(D): CPT

## 2025-02-25 PROCEDURE — 84133 ASSAY OF URINE POTASSIUM: CPT

## 2025-02-25 PROCEDURE — 76376 3D RENDER W/INTRP POSTPROCES: CPT

## 2025-02-25 PROCEDURE — 97116 GAIT TRAINING THERAPY: CPT

## 2025-02-25 PROCEDURE — 73521 X-RAY EXAM HIPS BI 2 VIEWS: CPT

## 2025-02-25 PROCEDURE — C1776: CPT

## 2025-02-25 PROCEDURE — 84300 ASSAY OF URINE SODIUM: CPT

## 2025-02-25 PROCEDURE — C1889: CPT

## 2025-02-25 PROCEDURE — 93306 TTE W/DOPPLER COMPLETE: CPT

## 2025-02-25 PROCEDURE — 84100 ASSAY OF PHOSPHORUS: CPT

## 2025-02-25 PROCEDURE — 87116 MYCOBACTERIA CULTURE: CPT

## 2025-02-25 PROCEDURE — C1713: CPT

## 2025-02-25 PROCEDURE — 84540 ASSAY OF URINE/UREA-N: CPT

## 2025-02-25 PROCEDURE — 87015 SPECIMEN INFECT AGNT CONCNTJ: CPT

## 2025-02-25 PROCEDURE — 87075 CULTR BACTERIA EXCEPT BLOOD: CPT

## 2025-02-25 PROCEDURE — 85025 COMPLETE CBC W/AUTO DIFF WBC: CPT

## 2025-02-25 PROCEDURE — 95708 EEG WO VID EA 12-26HR UNMNTR: CPT

## 2025-02-25 PROCEDURE — 70551 MRI BRAIN STEM W/O DYE: CPT | Mod: MC

## 2025-02-25 PROCEDURE — 84156 ASSAY OF PROTEIN URINE: CPT

## 2025-02-25 PROCEDURE — 99232 SBSQ HOSP IP/OBS MODERATE 35: CPT

## 2025-02-25 PROCEDURE — 72192 CT PELVIS W/O DYE: CPT | Mod: MC

## 2025-02-25 PROCEDURE — 83935 ASSAY OF URINE OSMOLALITY: CPT

## 2025-02-25 PROCEDURE — 84132 ASSAY OF SERUM POTASSIUM: CPT

## 2025-02-25 PROCEDURE — 97530 THERAPEUTIC ACTIVITIES: CPT

## 2025-02-25 PROCEDURE — 83036 HEMOGLOBIN GLYCOSYLATED A1C: CPT

## 2025-02-25 PROCEDURE — 80048 BASIC METABOLIC PNL TOTAL CA: CPT

## 2025-02-25 PROCEDURE — 87206 SMEAR FLUORESCENT/ACID STAI: CPT

## 2025-02-25 PROCEDURE — 93005 ELECTROCARDIOGRAM TRACING: CPT

## 2025-02-25 PROCEDURE — 87070 CULTURE OTHR SPECIMN AEROBIC: CPT

## 2025-02-25 PROCEDURE — 81001 URINALYSIS AUTO W/SCOPE: CPT

## 2025-02-25 PROCEDURE — 85610 PROTHROMBIN TIME: CPT

## 2025-02-25 PROCEDURE — 85027 COMPLETE CBC AUTOMATED: CPT

## 2025-02-25 PROCEDURE — 82330 ASSAY OF CALCIUM: CPT

## 2025-02-25 PROCEDURE — 82962 GLUCOSE BLOOD TEST: CPT

## 2025-02-25 PROCEDURE — 71045 X-RAY EXAM CHEST 1 VIEW: CPT

## 2025-02-25 PROCEDURE — 85379 FIBRIN DEGRADATION QUANT: CPT

## 2025-02-25 RX ORDER — TRAMADOL HYDROCHLORIDE 50 MG/1
0 TABLET, FILM COATED ORAL
Refills: 0 | DISCHARGE

## 2025-02-25 RX ORDER — INSULIN GLARGINE-YFGN 100 [IU]/ML
10 INJECTION, SOLUTION SUBCUTANEOUS
Qty: 0 | Refills: 0 | DISCHARGE
Start: 2025-02-25

## 2025-02-25 RX ORDER — ASPIRIN 325 MG
1 TABLET ORAL
Qty: 0 | Refills: 0 | DISCHARGE
Start: 2025-02-25

## 2025-02-25 RX ORDER — LEVETIRACETAM 10 MG/ML
1 INJECTION, SOLUTION INTRAVENOUS
Qty: 0 | Refills: 0 | DISCHARGE
Start: 2025-02-25

## 2025-02-25 RX ORDER — LIDOCAINE HYDROCHLORIDE 20 MG/ML
0 JELLY TOPICAL
Refills: 0 | DISCHARGE

## 2025-02-25 RX ORDER — OXYCODONE HYDROCHLORIDE 30 MG/1
2.5 TABLET ORAL
Qty: 0 | Refills: 0 | DISCHARGE
Start: 2025-02-25

## 2025-02-25 RX ORDER — OXYCODONE HYDROCHLORIDE 30 MG/1
1 TABLET ORAL
Qty: 0 | Refills: 0 | DISCHARGE
Start: 2025-02-25

## 2025-02-25 RX ADMIN — Medication 60 MILLIGRAM(S): at 10:40

## 2025-02-25 RX ADMIN — INSULIN LISPRO 1: 100 INJECTION, SOLUTION INTRAVENOUS; SUBCUTANEOUS at 08:13

## 2025-02-25 RX ADMIN — OXYCODONE HYDROCHLORIDE 5 MILLIGRAM(S): 30 TABLET ORAL at 07:50

## 2025-02-25 RX ADMIN — Medication 81 MILLIGRAM(S): at 06:24

## 2025-02-25 RX ADMIN — OXYCODONE HYDROCHLORIDE 5 MILLIGRAM(S): 30 TABLET ORAL at 06:52

## 2025-02-25 RX ADMIN — LEVETIRACETAM 500 MILLIGRAM(S): 10 INJECTION, SOLUTION INTRAVENOUS at 06:24

## 2025-02-25 RX ADMIN — INSULIN LISPRO 1: 100 INJECTION, SOLUTION INTRAVENOUS; SUBCUTANEOUS at 12:16

## 2025-02-25 NOTE — DISCHARGE NOTE NURSING/CASE MANAGEMENT/SOCIAL WORK - FINANCIAL ASSISTANCE
[facility] provides services at a reduced cost to those who are determined to be eligible through [facility]’s financial assistance program. Information regarding [facility]’s financial assistance program can be found by going to [email or web address] or by calling [phone].

## 2025-02-25 NOTE — DISCHARGE NOTE NURSING/CASE MANAGEMENT/SOCIAL WORK - PATIENT PORTAL LINK FT
You can access the FollowMyHealth Patient Portal offered by [facility] by registering at the following website: [email or web address]. By joining [employer]’s FollowMyHealth portal, you will also be able to view your health information using other applications (apps) compatible with our system.

## 2025-02-25 NOTE — DISCHARGE NOTE PROVIDER - NSDCCPCAREPLAN_GEN_ALL_CORE_FT
PRINCIPAL DISCHARGE DIAGNOSIS  Diagnosis: Hip dislocation, right  Assessment and Plan of Treatment:       SECONDARY DISCHARGE DIAGNOSES  Diagnosis: Seizures, transient  Assessment and Plan of Treatment:

## 2025-02-25 NOTE — DISCHARGE NOTE PROVIDER - NSDCFUSCHEDAPPT_GEN_ALL_CORE_FT
[facility]  ORTHOSUR [address]  Scheduled Appointment: 03/07/2025     [facility]  ORTHOSURG [address]  Scheduled Appointment: 03/07/2025    Heraclio Jim  [facility]  HEMONC [address]  Scheduled Appointment: 03/17/2025

## 2025-02-25 NOTE — DISCHARGE NOTE PROVIDER - NSDCACTIVITY_GEN_ALL_CORE
Return to Work/School allowed/Do not drive or operate machinery/No heavy lifting/straining/Follow Instructions Provided by your Surgical Team

## 2025-02-25 NOTE — PROGRESS NOTE ADULT - SUBJECTIVE AND OBJECTIVE BOX
Ortho  Progress Note      Surgery: POD # 5 s/p revision Right total hip replacement with Dr. Almanzar on 2/20 for recurrent dislocation   post op course complicated by new onset seizure activity i/s/o lung ca with h/o brain mets     Patient seen and examined at bedside this AM   Pt reports pain controlled  Denies CP, SOB, N/V, numbness/tingling   voiding: without difficulty, endorses BM post op     Vital Signs Last 24 Hrs  T(C): --  T(F): --  HR: 84 (02-25-25 @ 12:36) (84 - 84)  BP: 167/77 (02-25-25 @ 12:36) (135/66 - 167/77)  BP(mean): --  RR: 20 (02-25-25 @ 12:36) (20 - 20)  SpO2: 98% (02-25-25 @ 12:36) (98% - 98%)      General: Pt Alert and oriented, NAD  + left sides hemifacial asymmetry noted, most pronounced around left half of mouth with talking, 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 Almanzar on 2/20 for recurrent dislocation   post op course complicated by new onset seizure activity i/s/o lung ca with h/o brain mets     - Last labs 2/23 with improved renal function, patient with difficulty IV access- no repeat labs needed per Dr. Douglass  - HTN: nifedipine 60 resumed post op, d/c on home dose of 90mg daily per Dr. Douglass  - post op seizure onset: EEG completed, MRI w/o contrast obtained, c/w keppra 500mg BID, outpatient f/u 4-6 weeks with Dr. Veloz  - h/o brain mets, lung primary, previously followed with Dr. Whitt who is leaving the [facility], plan for outpatient f/u with Dr. Jim within 2-4 weeks   - continue with pain control PRN  - antibiotics: post op abx completed, OR cultures NGTD   - DVT ppx: c/w ASA 81mg BID   - WBS: WBAT with posterior hip precautions RLE   - PT/OT  - continue Incentive Spirometer, bowel regimen, GI ppx   - continue diet: carb consistent diet   - Dispo:  d/c to [facility] today, [facility], plan discussed With Dr. Almanzar, Dr. Douglass, Epilepsy team, and outpatient office of Dr. Jim   - Discussed with patient's daughter who is in agreement with the plan     Ortho Pager [phone]

## 2025-02-25 NOTE — PROGRESS NOTE ADULT - PROVIDER SPECIALTY LIST ADULT
Internal Medicine
Orthopedics
SICU
Epilepsy
Internal Medicine
Internal Medicine
Orthopedics
SICU
Epilepsy
Epilepsy
Internal Medicine
Internal Medicine
Orthopedics
SICU
Internal Medicine
Cameroonian

## 2025-02-25 NOTE — PROGRESS NOTE ADULT - TIME BILLING
Patient seen and examined;  Dysphagia  Follow H/H  If able to eat then resume home meds as well as insulin  Discussed with Surgical team
Patient seen and examined;  ECHO good LV function;  Duplex of legs no clot;  Patient medically cleared for OR;   Any questions please call  Will follow
Patient seen and examined;  Stable exam  Glucoses stable;  No change in current meds;  Physical therapy
Patient seen and examined;  Back to baseline;  Will resume Lantus at night   No seizure activity   Physical therapy
Patient seen and examined;  Will go to OR Thursday to repair Right hip;  Obtain ECHO prior to OR   Also agree with venous duplex;  Has history of DVT
Patient seen and examined;  For OR today  Pre op labs to be done   Call for any problems
Patient seen and examined;   Mental status improved   When she moves tomorrow you can put on my service;  Neuro plans noted; Repeat CT scan;  MRI also;  No evidence of seizure activity on EEG so far  Keppra dose noted

## 2025-02-25 NOTE — PROVIDER CONTACT NOTE (OTHER) - SITUATION
Patient with SBP above parameters.
Patient was in SR - ST as high as 128 bpm and had PSVT of 2 seconds at 152 bpm

## 2025-02-25 NOTE — DISCHARGE NOTE PROVIDER - NSDCMRMEDTOKEN_GEN_ALL_CORE_FT
acetaminophen 325 mg oral tablet: 2 tab(s) orally every 6 hours As needed Temp greater or equal to 38C (100.4F), Mild Pain (1 - 3)  atorvastatin 40 mg oral tablet: 1 tab(s) orally once a day (at bedtime)  insulin glargine 100 units/mL subcutaneous solution: 14 u at bedtime  insulin lispro 100 units/mL injectable solution: 12 unit(s) subcutaneous 3 times a day (before meals)  Lidocaine: 4% 1 patch on each shoulder daily  NIFEdipine 90 mg oral tablet, extended release: 1 tab(s) orally once a day  traMADol: 25 mg q6 prn for pain   acetaminophen 325 mg oral tablet: 2 tab(s) orally every 6 hours As needed Temp greater or equal to 38C (100.4F), Mild Pain (1 - 3)  aspirin 81 mg oral tablet, chewable: 1 tab(s) orally 2 times a day  atorvastatin 40 mg oral tablet: 1 tab(s) orally once a day (at bedtime)  insulin glargine 100 units/mL subcutaneous solution: 10 unit(s) subcutaneous once a day (at bedtime) patient on 14u at home. Resumed on 10u post operatively. Continue 10u, titrate up as indicated  insulin lispro 100 units/mL injectable solution: 12 unit(s) subcutaneous 3 times a day (before meals) 12u TID with meals: home Regimen. Can continue as Insulin sliding scale at rehab  levETIRAcetam 500 mg oral tablet: 1 tab(s) orally 2 times a day continue this medication until your follow up appointment with Dr. Veloz  NIFEdipine 90 mg oral tablet, extended release: 1 tab(s) orally once a day  oxyCODONE: 2.5 milligram(s) orally every 4 hours  oxyCODONE 5 mg oral tablet: 1 tab(s) orally every 4 hours As needed Severe Pain (7 - 10)

## 2025-02-25 NOTE — DISCHARGE NOTE PROVIDER - PROVIDER TOKENS
PROVIDER:[TOKEN:[[id number]],FOLLOWUP:[2 weeks]],PROVIDER:[TOKEN:[[id number]],FOLLOWUP:[1 month]],PROVIDER:[TOKEN:[[id number]],SCHEDULEDAPPT:[03/17/2025]],PROVIDER:[TOKEN:[[id number]],FOLLOWUP:[1 month]]

## 2025-02-25 NOTE — PROGRESS NOTE ADULT - SUBJECTIVE AND OBJECTIVE BOX
INTERVAL HPI/OVERNIGHT EVENTS:  Awake and no complaint   OOB with physical therapy  Sugars stable       MEDICATIONS  (STANDING):  aspirin  chewable 81 milliGRAM(s) Oral two times a day  chlorhexidine 2% Cloths 1 Application(s) Topical <User Schedule>  dextrose 5%. 1000 milliLiter(s) (100 mL/Hr) IV Continuous <Continuous>  dextrose 50% Injectable 25 Gram(s) IV Push once  dextrose 50% Injectable 25 Gram(s) IV Push once  glucagon  Injectable 1 milliGRAM(s) IntraMuscular once  glucagon  Injectable 1 milliGRAM(s) IntraMuscular once  insulin glargine Injectable (LANTUS) 10 Unit(s) SubCutaneous at bedtime  insulin lispro (ADMELOG) corrective regimen sliding scale   SubCutaneous Before meals and at bedtime  lactated ringers. 1000 milliLiter(s) (100 mL/Hr) IV Continuous <Continuous>  levETIRAcetam 500 milliGRAM(s) Oral two times a day  NIFEdipine XL 60 milliGRAM(s) Oral every 24 hours    MEDICATIONS  (PRN):  hydrALAZINE Injectable 10 milliGRAM(s) IV Push every 6 hours PRN SBP >160  ondansetron Injectable 4 milliGRAM(s) IV Push every 6 hours PRN Nausea and/or Vomiting  oxyCODONE    IR 2.5 milliGRAM(s) Oral every 4 hours PRN Moderate Pain (4 - 6)  oxyCODONE    IR 5 milliGRAM(s) Oral every 4 hours PRN Severe Pain (7 - 10)      Allergies    citrus (Urticaria)  strawberry (Pruritus)  Bactrim (Rash)    Intolerances        Vital Signs Last 24 Hrs  T(C): 36.9 (25 Feb 2025 08:30), Max: 37.1 (24 Feb 2025 22:24)  T(F): 98.5 (25 Feb 2025 08:30), Max: 98.8 (24 Feb 2025 22:24)  HR: 84 (25 Feb 2025 10:30) (73 - 92)  BP: 135/66 (25 Feb 2025 10:30) (117/56 - 158/73)  BP(mean): 88 (25 Feb 2025 05:30) (81 - 103)  RR: 18 (25 Feb 2025 08:30) (15 - 18)  SpO2: 98% (25 Feb 2025 08:30) (97% - 100%)    Parameters below as of 25 Feb 2025 08:30  Patient On (Oxygen Delivery Method): room air              Constitutional: Awake     Eyes: NEDRA    ENMT: Negative    Neck: Supple    Back:  no tenderness     Respiratory:  clear     Cardiovascular: S1 S2    Gastrointestinal: soft     Genitourinary:    Extremities: no edema     Vascular:    Neurological:    Skin:    Lymph Nodes:            02-24 @ 07:01  -  02-25 @ 07:00  --------------------------------------------------------  IN: 0 mL / OUT: 1250 mL / NET: -1250 mL      LABS:                RADIOLOGY & ADDITIONAL TESTS:

## 2025-02-25 NOTE — DISCHARGE NOTE PROVIDER - CARE PROVIDER_API CALL
Leonard Almanzar  Joint Reconstruction  [address]  [place] [address]  Phone: [phone]  Fax: [phone]  Follow Up Time: 2 weeks    Marissa Douglass  Critical Care Medicine  [address] [address]  Phone: [phone]  Fax: [phone]  Follow Up Time: 1 month    Heraclio Jim  Hematology/Oncology  [address]  [place] [address]  Phone: [phone]  Fax: [phone]  Scheduled Appointment: 03/17/2025    Shazia Veloz  Neurology  [address] [address]  Phone: [phone]  Fax: [phone]  Follow Up Time: 1 month

## 2025-02-25 NOTE — DISCHARGE NOTE PROVIDER - HOSPITAL COURSE
Admitted on 2/18/25  Attending: Oh and Dr. Douglass   Surgery: Revision Right hip replacement   Idania-op Antibiotics  Pain control  DVT prophylaxis  OOB/Physical Therapy/Occupational Therapy   Medicine Consult  SICU consult  Epilepsy Consult     74 year old female with history of  lung cancer (w/ mets to brain), CKD, HLD, DM2, RA (follows with Dr. Johnston), RLE DVT (off Eliquis d/t frequent falls), R hip replacement with multiple recent dislocations(1/13/25 with closed reduction done at that time), sent by Dr. Almanzar for admission for R hip surgery and medical optimization    Post op course complicated by new onset seizure. Patient admitted to ICU post operatively. Epilepsy consulted. Post op EEG performed, MRI brain performed. Keppra started- continue 500mg BID upon discharge.   Plan for outpatient follow up with epilepsy    ORTHO - ACTIVE  75yo Female with Right hip replacement with multiple recent dislocations (1/13/25 closed reduction). Discharged to rehab, at follow up visit 2/18 right thr dislocated again, s/p revision R THR 2/20, course complicated by seizure like activity   PMH: CKD 3, hx of lung CA with mets to brain, DM, HTN, HLD  Resident: Bassam   Attending: Dr. Almanzar  WBS: WBAT, PHP, abd pillow  Dsg: Prevena  DVT: ASA (Dr. Almanzar ok with any other AC needed)  Salazar: Yes  Dispo: pending PT/OT  Events:  2/18: Admitted to med, [x]med clearance  [X] dopplers neg  2/20: OR for right hip revision   2/21: upgraded to SICU for seizure like activity  2/22: pending EEG & MRI to r/o epilepsy/CVA. [x] XR pelvis, per neuro; keppra decreased to 500 BID from 750, passed dysphagia, pTOV, Rafat will continue to follow  2/23: MRI brain completed, tolerating PO converted ASA suppository to oral 81 bid and keppra iv to po  2/24: started po pain medication oxy 2.5/5. Lantus resumed at night per rafat. Lost IV access, patient refusing so couldnt get MRI w/ contrast, Per rafat ok for no IV for now, will f/u with epilepsy team if MRI w/ contrast needed inpatient - can f/u outpatient w/ heme/onc for MRI. BP slightly elevated, home nifedipine restarted (on 90mg daily at home but per douglass starting on 60 today and see how BP responds). EEG d/c'd today      * no nsaids, no celebrex*   [ ] FU OR Cx            Admitted on 2/18/25  Attending: Oh and Dr. Douglass   Surgery: Revision Right hip replacement   Idania-op Antibiotics  Pain control  DVT prophylaxis  OOB/Physical Therapy/Occupational Therapy   Medicine Consult  SICU consult  Epilepsy Consult     74 year old female with history of  lung cancer (w/ mets to brain)- follows with Rhett; CKD, HLD, DM2, RA (follows with Dr. Johnston), RLE DVT (off Eliquis d/t frequent falls), R hip replacement with multiple recent dislocations(1/13/25 with closed reduction done at that time), sent by Dr. Almanzar for admission for R hip surgery and medical optimization    Post op course complicated by new onset seizure. Patient admitted to ICU post operatively. Epilepsy consulted. Post op EEG performed, MRI brain performed. Keppra started- continue 500mg BID upon discharge.   Plan for outpatient follow up with epilepsy, hematology/oncology.

## 2025-02-25 NOTE — DISCHARGE NOTE PROVIDER - NSDCFUADDINST_GEN_ALL_CORE_FT
s/p: Revision Right Total Hip Replacement     ACTIVITY:   - Weight bear as tolerated with assistive device with posterior hip precautions.   - Apply a cold compress to the surgical site several times daily to reduce pain and swelling. For icing, twenty-minute sessions followed by an hour off is recommended. You should ice as frequently as possible. Ice should NEVER be placed directly on the skin. Wearing compression stockings during the first week after surgery can help reduce swelling in your knee, calf and foot, but is not required.      (POSTERIOR HIP REPLACEMENTS)   Hip precautions:   The following precautions will remain in effect for 6 weeks following surgery:   Do not cross your knees.   Do not flex your hip (i.e., bring your knee toward your chest) beyond 90 degrees.   Use a raised toilet seat. Do not use low chairs or couches.   Sleep with a pillow between your knees.   Do not reach down to  items on the floor.       DRESSING/SHOWERING:   (AQUACEL – brown gel dressing)   - You may shower, your dressing is water-resistant. Do not soak in bathtubs. Remove dressing after postop day 7, then leave incision open to air. You may do this yourself (simply peel it off), or you may ask for assistance from your visiting nurse. Keep your incision clean and dry. Do not pick at your incision. Do not apply creams, ointments or oils to your incision until cleared by your surgeon. Do not soak your incision in sitting water (ie tubs, pools, lakes, etc.) until cleared by your surgeon. Do not scrub the incision – instead, allow soap and water to flow over the incision and then pat it dry with a clean towel.     (PREVENA or LETICIA – incisional wound vac)   - You have an incisional wound vac dressing with tubing to attached canister/battery pack. You may shower but must keep battery pack dry at all times. The battery dies in 7 days then can remove dressing and leave open to air. Keep your incision clean and dry. Do not pick at your incision. Do not apply creams, ointments or oils to your incision until cleared by your surgeon. Do not soak your incision in sitting water (ie tubs, pools, lakes, etc.) until cleared by your surgeon. Do not scrub the incision – instead, allow soap and water to flow over the incision and then pat it dry with a clean towel.     MEDICATION/ANTICOAGULATION:   -You have been prescribed Aspirin, as a preventative to help prevent postoperative blood clots. Please take this medication as prescribed.    - You have been prescribed medications for pain:   - Tylenol for mild to moderate pain. Do not exceed 3,000mg daily.   - For more severe pain, take Tylenol with the addition of narcotic pain medication. Take this medication as prescribed. This medication may cause drowsiness or dizziness. Do not operate machinery. This medication may cause constipation.   - For any additional medications, follow instructions on the bottle.    -Try to have regular bowel movements. Take stool softener or laxative if necessary. You may wish to take Miralax daily until you have regular bowel movements.    - If you have been prescribed Aspirin or an anti-inflammatory, please take prilosec (omeprazole) once a day, before breakfast, until no longer taking Aspirin or anti-inflammatory. This will help protect your stomach.   - If you have a pain management physician, please follow-up with them postoperatively.    - If you experience any negative side effects of your medications, please call your surgeon's office to discuss.     FOLLOW-UP:   - Call to schedule an appt with Dr. Douglass for follow up.   - Please follow-up with your primary care physician or any other specialist you see postoperatively, if needed.    - Contact your doctor or go to the emergency room if you experience: fever greater than 101.5, chills, chest pain, difficulty breathing, redness or excessive drainage around the incision, other concerns. s/p: Revision Right Total Hip Replacement     ACTIVITY:   - Weight bear as tolerated with assistive device with posterior hip precautions.   - Apply a cold compress to the surgical site several times daily to reduce pain and swelling. For icing, twenty-minute sessions followed by an hour off is recommended. You should ice as frequently as possible. Ice should NEVER be placed directly on the skin. Wearing compression stockings during the first week after surgery can help reduce swelling in your knee, calf and foot, but is not required.      (POSTERIOR HIP REPLACEMENTS)   Hip precautions:   The following precautions will remain in effect for 6 weeks following surgery:   Do not cross your knees.   Do not flex your hip (i.e., bring your knee toward your chest) beyond 90 degrees.   Use a raised toilet seat. Do not use low chairs or couches.   Sleep with a pillow between your knees.   Do not reach down to  items on the floor.       DRESSING/SHOWERING:   (AQUACEL – brown gel dressing)   - You may shower, your dressing is water-resistant. Do not soak in bathtubs. Remove dressing after postop day 7, then leave incision open to air. You may do this yourself (simply peel it off), or you may ask for assistance from your visiting nurse. Keep your incision clean and dry. Do not pick at your incision. Do not apply creams, ointments or oils to your incision until cleared by your surgeon. Do not soak your incision in sitting water (ie tubs, pools, lakes, etc.) until cleared by your surgeon. Do not scrub the incision – instead, allow soap and water to flow over the incision and then pat it dry with a clean towel.     MEDICATION/ANTICOAGULATION:   -You have been prescribed Aspirin, as a preventative to help prevent postoperative blood clots. Please take this medication as prescribed.    - You have been prescribed medications for pain:   - Tylenol for mild to moderate pain. Do not exceed 3,000mg daily.   - For more severe pain, take Tylenol with the addition of narcotic pain medication. Take this medication as prescribed. This medication may cause drowsiness or dizziness. Do not operate machinery. This medication may cause constipation.   - For any additional medications, follow instructions on the bottle.    -Try to have regular bowel movements. Take stool softener or laxative if necessary. You may wish to take Miralax daily until you have regular bowel movements.    - If you have been prescribed Aspirin or an anti-inflammatory, please take prilosec (omeprazole) once a day, before breakfast, until no longer taking Aspirin or anti-inflammatory. This will help protect your stomach.   - If you have a pain management physician, please follow-up with them postoperatively.    - If you experience any negative side effects of your medications, please call your surgeon's office to discuss.     FOLLOW-UP:   - Call to schedule an appt with Dr. Douglass for follow up.   - Please follow-up with your primary care physician or any other specialist you see postoperatively, if needed.    - Contact your doctor or go to the emergency room if you experience: fever greater than 101.5, chills, chest pain, difficulty breathing, redness or excessive drainage around the incision, other concerns.

## 2025-02-25 NOTE — PROGRESS NOTE ADULT - PROBLEM SELECTOR PROBLEM 1
Dislocation of prosthesis of right hip joint

## 2025-02-25 NOTE — DISCHARGE NOTE PROVIDER - NSDCCPTREATMENT_GEN_ALL_CORE_FT
PRINCIPAL PROCEDURE  Procedure: Minimally invasive revision of total replacement of right hip  Findings and Treatment:

## 2025-02-25 NOTE — DISCHARGE NOTE PROVIDER - CARE PROVIDERS DIRECT ADDRESSES
[email or web address]

## 2025-02-26 ENCOUNTER — NON-APPOINTMENT (OUTPATIENT)
Age: 75
End: 2025-02-26
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
## 2025-03-04 DIAGNOSIS — Z79.82 LONG TERM (CURRENT) USE OF ASPIRIN: ICD-10-CM

## 2025-03-04 DIAGNOSIS — T84.020A DISLOCATION OF INTERNAL RIGHT HIP PROSTHESIS, INITIAL ENCOUNTER: ICD-10-CM

## 2025-03-04 DIAGNOSIS — Z91.018 ALLERGY TO OTHER FOODS: ICD-10-CM

## 2025-03-04 DIAGNOSIS — G51.0 BELL'S PALSY: ICD-10-CM
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
## 2025-03-04 DIAGNOSIS — Z85.118 PERSONAL HISTORY OF OTHER MALIGNANT NEOPLASM OF BRONCHUS AND LUNG: ICD-10-CM

## 2025-03-04 DIAGNOSIS — I12.9 HYPERTENSIVE CHRONIC KIDNEY DISEASE WITH STAGE 1 THROUGH STAGE 4 CHRONIC KIDNEY DISEASE, OR UNSPECIFIED CHRONIC KIDNEY DISEASE: ICD-10-CM

## 2025-03-04 DIAGNOSIS — C79.31 SECONDARY MALIGNANT NEOPLASM OF BRAIN: ICD-10-CM

## 2025-03-04 DIAGNOSIS — R56.9 UNSPECIFIED CONVULSIONS: ICD-10-CM

## 2025-03-04 DIAGNOSIS — E11.22 TYPE 2 DIABETES MELLITUS WITH DIABETIC CHRONIC KIDNEY DISEASE: ICD-10-CM

## 2025-03-04 DIAGNOSIS — Z86.718 PERSONAL HISTORY OF OTHER VENOUS THROMBOSIS AND EMBOLISM: ICD-10-CM

## 2025-03-04 DIAGNOSIS — E78.5 HYPERLIPIDEMIA, UNSPECIFIED: ICD-10-CM

## 2025-03-04 DIAGNOSIS — N18.4 CHRONIC KIDNEY DISEASE, STAGE 4 (SEVERE): ICD-10-CM

## 2025-03-04 DIAGNOSIS — R29.6 REPEATED FALLS: ICD-10-CM

## 2025-03-07 ENCOUNTER — APPOINTMENT (OUTPATIENT)
Dept: ORTHOPEDIC SURGERY | Facility: CLINIC | Age: 75
End: 2025-03-07
Payer: MEDICARE

## 2025-03-07 ENCOUNTER — OUTPATIENT (OUTPATIENT)
Dept: OUTPATIENT SERVICES | Facility: HOSPITAL | Age: 75
LOS: 1 days | End: 2025-03-07
Payer: MEDICARE

## 2025-03-07 ENCOUNTER — RESULT REVIEW (OUTPATIENT)
Age: 75
End: 2025-03-07

## 2025-03-07 VITALS
HEART RATE: 95 BPM | OXYGEN SATURATION: 86 % | HEIGHT: 63 IN | DIASTOLIC BLOOD PRESSURE: 69 MMHG | BODY MASS INDEX: 26.75 KG/M2 | SYSTOLIC BLOOD PRESSURE: 106 MMHG | WEIGHT: 151 LBS

## 2025-03-07 DIAGNOSIS — Z96.641 PRESENCE OF RIGHT ARTIFICIAL HIP JOINT: Chronic | ICD-10-CM

## 2025-03-07 DIAGNOSIS — Z96.641 AFTERCARE FOLLOWING JOINT REPLACEMENT SURGERY: ICD-10-CM

## 2025-03-07 DIAGNOSIS — Z47.1 AFTERCARE FOLLOWING JOINT REPLACEMENT SURGERY: ICD-10-CM

## 2025-03-07 PROCEDURE — 73502 X-RAY EXAM HIP UNI 2-3 VIEWS: CPT | Mod: 26,RT

## 2025-03-07 PROCEDURE — 99024 POSTOP FOLLOW-UP VISIT: CPT

## 2025-03-07 PROCEDURE — 73502 X-RAY EXAM HIP UNI 2-3 VIEWS: CPT

## 2025-03-13 ENCOUNTER — APPOINTMENT (OUTPATIENT)
Dept: HEART AND VASCULAR | Facility: CLINIC | Age: 75
End: 2025-03-13

## 2025-03-17 ENCOUNTER — APPOINTMENT (OUTPATIENT)
Dept: HEMATOLOGY ONCOLOGY | Facility: CLINIC | Age: 75
End: 2025-03-17
Payer: MEDICARE

## 2025-03-17 VITALS
HEIGHT: 63 IN | OXYGEN SATURATION: 97 % | HEART RATE: 81 BPM | DIASTOLIC BLOOD PRESSURE: 69 MMHG | WEIGHT: 145 LBS | BODY MASS INDEX: 25.69 KG/M2 | TEMPERATURE: 98.7 F | RESPIRATION RATE: 18 BRPM | SYSTOLIC BLOOD PRESSURE: 113 MMHG

## 2025-03-17 DIAGNOSIS — C79.31 MALIGNANT NEOPLASM OF UNSPECIFIED PART OF UNSPECIFIED BRONCHUS OR LUNG: ICD-10-CM

## 2025-03-17 DIAGNOSIS — C34.90 MALIGNANT NEOPLASM OF UNSPECIFIED PART OF UNSPECIFIED BRONCHUS OR LUNG: ICD-10-CM

## 2025-03-17 PROCEDURE — 99215 OFFICE O/P EST HI 40 MIN: CPT

## 2025-03-17 PROCEDURE — G2211 COMPLEX E/M VISIT ADD ON: CPT

## 2025-03-24 LAB
ALBUMIN SERPL ELPH-MCNC: 3.1 G/DL
ALP BLD-CCNC: 94 U/L
ALT SERPL-CCNC: 8 U/L
ANION GAP SERPL CALC-SCNC: 10 MMOL/L
AST SERPL-CCNC: 19 U/L
BILIRUB SERPL-MCNC: 0.5 MG/DL
BUN SERPL-MCNC: 30 MG/DL
CALCIUM SERPL-MCNC: 10.2 MG/DL
CHLORIDE SERPL-SCNC: 106 MMOL/L
CO2 SERPL-SCNC: 27 MMOL/L
CREAT SERPL-MCNC: 2.1 MG/DL
EGFRCR SERPLBLD CKD-EPI 2021: 24 ML/MIN/1.73M2
GLUCOSE SERPL-MCNC: 74 MG/DL
HCT VFR BLD CALC: 31.1 %
HGB BLD-MCNC: 9.8 G/DL
LYMPHOCYTES # BLD AUTO: 1.2 K/UL
LYMPHOCYTES NFR BLD AUTO: 12.1 %
MAN DIFF?: NO
MCHC RBC-ENTMCNC: 26.5 PG
MCHC RBC-ENTMCNC: 31.5 G/DL
MCV RBC AUTO: 84.1 FL
NEUTROPHILS # BLD AUTO: 7.8 K/UL
NEUTROPHILS NFR BLD AUTO: 77.3 %
PLATELET # BLD AUTO: 331 K/UL
POTASSIUM SERPL-SCNC: 4.7 MMOL/L
PROT SERPL-MCNC: 8.3 G/DL
RBC # BLD: 3.7 M/UL
RBC # FLD: 17.2 %
SODIUM SERPL-SCNC: 143 MMOL/L
TSH SERPL-ACNC: 1.33 UIU/ML
WBC # FLD AUTO: 10.1 K/UL

## 2025-03-25 ENCOUNTER — TRANSCRIPTION ENCOUNTER (OUTPATIENT)
Age: 75
End: 2025-03-25

## 2025-04-04 ENCOUNTER — TRANSCRIPTION ENCOUNTER (OUTPATIENT)
Age: 75
End: 2025-04-04

## 2025-04-04 ENCOUNTER — APPOINTMENT (OUTPATIENT)
Dept: ORTHOPEDIC SURGERY | Facility: CLINIC | Age: 75
End: 2025-04-04
Payer: MEDICARE

## 2025-04-04 ENCOUNTER — RESULT REVIEW (OUTPATIENT)
Age: 75
End: 2025-04-04

## 2025-04-04 ENCOUNTER — OUTPATIENT (OUTPATIENT)
Dept: OUTPATIENT SERVICES | Facility: HOSPITAL | Age: 75
LOS: 1 days | End: 2025-04-04
Payer: MEDICARE

## 2025-04-04 VITALS
DIASTOLIC BLOOD PRESSURE: 72 MMHG | HEART RATE: 73 BPM | BODY MASS INDEX: 25.69 KG/M2 | WEIGHT: 145 LBS | SYSTOLIC BLOOD PRESSURE: 120 MMHG | OXYGEN SATURATION: 99 % | HEIGHT: 63 IN | TEMPERATURE: 98.6 F

## 2025-04-04 DIAGNOSIS — Z96.641 PRESENCE OF RIGHT ARTIFICIAL HIP JOINT: Chronic | ICD-10-CM

## 2025-04-04 DIAGNOSIS — Z47.1 AFTERCARE FOLLOWING JOINT REPLACEMENT SURGERY: ICD-10-CM

## 2025-04-04 DIAGNOSIS — Z96.641 AFTERCARE FOLLOWING JOINT REPLACEMENT SURGERY: ICD-10-CM

## 2025-04-04 PROCEDURE — 73502 X-RAY EXAM HIP UNI 2-3 VIEWS: CPT | Mod: 26,RT

## 2025-04-04 PROCEDURE — 73502 X-RAY EXAM HIP UNI 2-3 VIEWS: CPT

## 2025-04-04 PROCEDURE — 99024 POSTOP FOLLOW-UP VISIT: CPT

## 2025-04-08 ENCOUNTER — APPOINTMENT (OUTPATIENT)
Dept: HEMATOLOGY ONCOLOGY | Facility: CLINIC | Age: 75
End: 2025-04-08

## 2025-05-01 NOTE — ED ADULT TRIAGE NOTE - PRO INTERPRETER NEED 2
Mounjaro 2.5 weekly  Stop trulicity  After taking the third injection---send me a mychart message to increase the dose.       
English

## 2025-05-08 ENCOUNTER — NON-APPOINTMENT (OUTPATIENT)
Age: 75
End: 2025-05-08

## 2025-05-13 ENCOUNTER — APPOINTMENT (OUTPATIENT)
Dept: MRI IMAGING | Facility: HOSPITAL | Age: 75
End: 2025-05-13
Payer: MEDICARE

## 2025-05-13 ENCOUNTER — APPOINTMENT (OUTPATIENT)
Dept: CT IMAGING | Facility: HOSPITAL | Age: 75
End: 2025-05-13
Payer: MEDICARE

## 2025-05-13 ENCOUNTER — RESULT REVIEW (OUTPATIENT)
Age: 75
End: 2025-05-13

## 2025-05-13 ENCOUNTER — OUTPATIENT (OUTPATIENT)
Dept: OUTPATIENT SERVICES | Facility: HOSPITAL | Age: 75
LOS: 1 days | End: 2025-05-13
Payer: MEDICARE

## 2025-05-13 DIAGNOSIS — Z96.641 PRESENCE OF RIGHT ARTIFICIAL HIP JOINT: Chronic | ICD-10-CM

## 2025-05-13 DIAGNOSIS — C79.31 MALIGNANT NEOPLASM OF UNSPECIFIED PART OF UNSPECIFIED BRONCHUS OR LUNG: ICD-10-CM

## 2025-05-13 DIAGNOSIS — C34.90 MALIGNANT NEOPLASM OF UNSPECIFIED PART OF UNSPECIFIED BRONCHUS OR LUNG: ICD-10-CM

## 2025-05-13 PROCEDURE — 74176 CT ABD & PELVIS W/O CONTRAST: CPT | Mod: 26

## 2025-05-13 PROCEDURE — 71250 CT THORAX DX C-: CPT | Mod: 26

## 2025-05-13 PROCEDURE — 70551 MRI BRAIN STEM W/O DYE: CPT | Mod: 26

## 2025-05-14 ENCOUNTER — INPATIENT (INPATIENT)
Facility: HOSPITAL | Age: 75
LOS: 4 days | Discharge: HOSPICE | DRG: 54 | End: 2025-05-19
Attending: INTERNAL MEDICINE | Admitting: INTERNAL MEDICINE
Payer: MEDICARE

## 2025-05-14 VITALS
OXYGEN SATURATION: 98 % | HEIGHT: 64 IN | TEMPERATURE: 98 F | RESPIRATION RATE: 18 BRPM | SYSTOLIC BLOOD PRESSURE: 184 MMHG | HEART RATE: 60 BPM | WEIGHT: 160.06 LBS | DIASTOLIC BLOOD PRESSURE: 93 MMHG

## 2025-05-14 DIAGNOSIS — Z96.641 PRESENCE OF RIGHT ARTIFICIAL HIP JOINT: Chronic | ICD-10-CM

## 2025-05-14 LAB
ALBUMIN SERPL ELPH-MCNC: 3.9 G/DL — SIGNIFICANT CHANGE UP (ref 3.3–5)
ALP SERPL-CCNC: 123 U/L — HIGH (ref 40–120)
ALT FLD-CCNC: 9 U/L — LOW (ref 10–45)
ANION GAP SERPL CALC-SCNC: 13 MMOL/L — SIGNIFICANT CHANGE UP (ref 5–17)
APTT BLD: 27.6 SEC — SIGNIFICANT CHANGE UP (ref 26.1–36.8)
AST SERPL-CCNC: 15 U/L — SIGNIFICANT CHANGE UP (ref 10–40)
BASOPHILS # BLD AUTO: 0.04 K/UL — SIGNIFICANT CHANGE UP (ref 0–0.2)
BASOPHILS NFR BLD AUTO: 0.5 % — SIGNIFICANT CHANGE UP (ref 0–2)
BILIRUB DIRECT SERPL-MCNC: <0.1 MG/DL — SIGNIFICANT CHANGE UP (ref 0–0.3)
BILIRUB INDIRECT FLD-MCNC: SIGNIFICANT CHANGE UP (ref 0.2–1)
BILIRUB SERPL-MCNC: <0.2 MG/DL — SIGNIFICANT CHANGE UP (ref 0.2–1.2)
BLD GP AB SCN SERPL QL: NEGATIVE — SIGNIFICANT CHANGE UP
BUN SERPL-MCNC: 57 MG/DL — HIGH (ref 7–23)
CALCIUM SERPL-MCNC: 9.5 MG/DL — SIGNIFICANT CHANGE UP (ref 8.4–10.5)
CHLORIDE SERPL-SCNC: 107 MMOL/L — SIGNIFICANT CHANGE UP (ref 96–108)
CO2 SERPL-SCNC: 24 MMOL/L — SIGNIFICANT CHANGE UP (ref 22–31)
CREAT SERPL-MCNC: 2.45 MG/DL — HIGH (ref 0.5–1.3)
EGFR: 20 ML/MIN/1.73M2 — LOW
EGFR: 20 ML/MIN/1.73M2 — LOW
EOSINOPHIL # BLD AUTO: 0.23 K/UL — SIGNIFICANT CHANGE UP (ref 0–0.5)
EOSINOPHIL NFR BLD AUTO: 3.1 % — SIGNIFICANT CHANGE UP (ref 0–6)
GLUCOSE SERPL-MCNC: 122 MG/DL — HIGH (ref 70–99)
HCT VFR BLD CALC: 35 % — SIGNIFICANT CHANGE UP (ref 34.5–45)
HGB BLD-MCNC: 11 G/DL — LOW (ref 11.5–15.5)
IMM GRANULOCYTES NFR BLD AUTO: 0.1 % — SIGNIFICANT CHANGE UP (ref 0–0.9)
INR BLD: 1.06 — SIGNIFICANT CHANGE UP (ref 0.85–1.16)
LYMPHOCYTES # BLD AUTO: 1.37 K/UL — SIGNIFICANT CHANGE UP (ref 1–3.3)
LYMPHOCYTES # BLD AUTO: 18.7 % — SIGNIFICANT CHANGE UP (ref 13–44)
MCHC RBC-ENTMCNC: 26.6 PG — LOW (ref 27–34)
MCHC RBC-ENTMCNC: 31.4 G/DL — LOW (ref 32–36)
MCV RBC AUTO: 84.5 FL — SIGNIFICANT CHANGE UP (ref 80–100)
MONOCYTES # BLD AUTO: 0.56 K/UL — SIGNIFICANT CHANGE UP (ref 0–0.9)
MONOCYTES NFR BLD AUTO: 7.7 % — SIGNIFICANT CHANGE UP (ref 2–14)
NEUTROPHILS # BLD AUTO: 5.1 K/UL — SIGNIFICANT CHANGE UP (ref 1.8–7.4)
NEUTROPHILS NFR BLD AUTO: 69.9 % — SIGNIFICANT CHANGE UP (ref 43–77)
NRBC BLD AUTO-RTO: 0 /100 WBCS — SIGNIFICANT CHANGE UP (ref 0–0)
PLATELET # BLD AUTO: 188 K/UL — SIGNIFICANT CHANGE UP (ref 150–400)
POTASSIUM SERPL-MCNC: 3.8 MMOL/L — SIGNIFICANT CHANGE UP (ref 3.5–5.3)
POTASSIUM SERPL-SCNC: 3.8 MMOL/L — SIGNIFICANT CHANGE UP (ref 3.5–5.3)
PROT SERPL-MCNC: 7.9 G/DL — SIGNIFICANT CHANGE UP (ref 6–8.3)
PROTHROM AB SERPL-ACNC: 12.4 SEC — SIGNIFICANT CHANGE UP (ref 9.9–13.4)
RBC # BLD: 4.14 M/UL — SIGNIFICANT CHANGE UP (ref 3.8–5.2)
RBC # FLD: 15.8 % — HIGH (ref 10.3–14.5)
RH IG SCN BLD-IMP: POSITIVE — SIGNIFICANT CHANGE UP
SODIUM SERPL-SCNC: 144 MMOL/L — SIGNIFICANT CHANGE UP (ref 135–145)
WBC # BLD: 7.31 K/UL — SIGNIFICANT CHANGE UP (ref 3.8–10.5)
WBC # FLD AUTO: 7.31 K/UL — SIGNIFICANT CHANGE UP (ref 3.8–10.5)

## 2025-05-14 PROCEDURE — 99221 1ST HOSP IP/OBS SF/LOW 40: CPT

## 2025-05-14 PROCEDURE — 99285 EMERGENCY DEPT VISIT HI MDM: CPT

## 2025-05-14 RX ORDER — DEXAMETHASONE 0.5 MG/1
6 TABLET ORAL ONCE
Refills: 0 | Status: COMPLETED | OUTPATIENT
Start: 2025-05-14 | End: 2025-05-14

## 2025-05-14 RX ORDER — DEXAMETHASONE 0.5 MG/1
4 TABLET ORAL ONCE
Refills: 0 | Status: COMPLETED | OUTPATIENT
Start: 2025-05-14 | End: 2025-05-14

## 2025-05-14 RX ADMIN — DEXAMETHASONE 4 MILLIGRAM(S): 0.5 TABLET ORAL at 20:35

## 2025-05-14 RX ADMIN — DEXAMETHASONE 6 MILLIGRAM(S): 0.5 TABLET ORAL at 21:10

## 2025-05-14 NOTE — ED ADULT NURSE NOTE - ISAR MEMORY
h/o hyponatremia - had been on salt tabs but stopped 3-4 months ago   repeat Na  Na trend: 127--> 128---> 133--> 130  - f/u urine studies No

## 2025-05-14 NOTE — ED PROVIDER NOTE - HIV OFFER
Return to the emergency department if you have any new or worsening symptoms such as fever, excessive bleeding, chest pain, or intractable vomiting.   
Previously Declined (within the last year)

## 2025-05-14 NOTE — ED ADULT NURSE NOTE - OBJECTIVE STATEMENT
Pt presents for further treatment and admission of brain mass noted on MRI performed during recent admission. Pt alert to her name and location but not the date or sequence of events. No distress: maintaining airway and secretions without incident. Pt denies discrete pain or discomfort. Denies chest pain or SOB. No hemilateral deficits: no new confusion today.

## 2025-05-14 NOTE — ED ADULT NURSE NOTE - CHIEF COMPLAINT QUOTE
Pt BIB ambulance from [facility] reporting "They called her in because the MRI showed brain cancer got worse". A&Ox2 at baseline.

## 2025-05-14 NOTE — ED PROVIDER NOTE - CLINICAL SUMMARY MEDICAL DECISION MAKING FREE TEXT BOX
74 year old presenting with worsening metastatic disease, discussed with heme/onc, nsgy, will check labs, anticipate steroids, admit for hospice eval

## 2025-05-14 NOTE — CONSULT NOTE ADULT - SUBJECTIVE AND OBJECTIVE BOX
HISTORY OF PRESENT ILLNESS:   74 year old female, PMH metastatic NSCLC, presenting to ED from nursing home due to progression of brain disease on outpatient imaging. Outpt MRI w/o contrast 5/13 showing progression of R sided brain met with edema and mild MLS and CT CAP showing worsening body disease as well. She was not able to tell me much about what brought her to the ED.    PAST MEDICAL & SURGICAL HISTORY:  HTN (hypertension)  HLD (hyperlipidemia)  DM (diabetes mellitus), type 2  Rheumatoid arthritis  Degenerative joint disease (DJD) of lumbar spine  Lung cancer metastatic to brain  Stage 3 chronic kidney disease  S/P total right hip arthroplasty    FAMILY HISTORY:    SOCIAL HISTORY:  Tobacco Use: unknown  EtOH use: unknown  Substance: unknown    Allergies    Bactrim (Rash)  citrus (Urticaria)  strawberry (Pruritus)    Intolerances    REVIEW OF SYSTEMS  unable to obtain due to mental status	    MEDICATIONS:  Antibiotics:    Neuro:    Anticoagulation:    OTHER:    IVF:      Vital Signs Last 24 Hrs  T(C): 36.8 (14 May 2025 19:59), Max: 36.8 (14 May 2025 19:59)  T(F): 98.2 (14 May 2025 19:59), Max: 98.2 (14 May 2025 19:59)  HR: 61 (14 May 2025 21:07) (60 - 61)  BP: 174/79 (14 May 2025 21:07) (174/79 - 184/93)  BP(mean): --  RR: 16 (14 May 2025 21:07) (16 - 18)  SpO2: 98% (14 May 2025 21:07) (98% - 98%)    Parameters below as of 14 May 2025 21:07  Patient On (Oxygen Delivery Method): room air      PHYSICAL EXAM:  Constitutional: NAD, resting comfortably in bed.   HEENT: Pupils equal, round, reactive to light. EOMI. Face symmetric, tongue midline.  Respiratory: No respiratory distress, lungs clear to auscultation bilaterally.   Cardiovascular: RRR, S1, S2.   Gastrointestinal: Abdomen soft, non tender, nondistended.  Neurological:  AAOX3. Opens eyes. Follows commands. Speech clear.  Cranial Nerves: II-XII intact  Motor: 4-/5 power in b/l UE and LE  Sensation: intact to light touch in all extremities  Pronator Drift: none  Dysmetria: not tested  Extremities: Warm, well perfused.  Wounds: n/a      LABS:                        11.0   7.31  )-----------( 188      ( 14 May 2025 20:28 )             35.0     05-14    144  |  107  |  57[H]  ----------------------------<  122[H]  3.8   |  24  |  2.45[H]    Ca    9.5      14 May 2025 20:28    TPro  7.9  /  Alb  3.9  /  TBili  <0.2  /  DBili  <0.1  /  AST  15  /  ALT  9[L]  /  AlkPhos  123[H]  05-14    PT/INR - ( 14 May 2025 20:28 )   PT: 12.4 sec;   INR: 1.06          PTT - ( 14 May 2025 20:28 )  PTT:27.6 sec  Urinalysis Basic - ( 14 May 2025 20:28 )    Color: x / Appearance: x / SG: x / pH: x  Gluc: 122 mg/dL / Ketone: x  / Bili: x / Urobili: x   Blood: x / Protein: x / Nitrite: x   Leuk Esterase: x / RBC: x / WBC x   Sq Epi: x / Non Sq Epi: x / Bacteria: x      CULTURES:      RADIOLOGY & ADDITIONAL STUDIES:    Assessment:  74 year old female, PMH metastatic NSCLC, presenting to ED from nursing home due to progression of brain disease on outpatient imaging. Outpt MRI w/o contrast 5/13 showing progression of R sided brain met with edema and mild MLS and CT CAP showing worsening body disease as well. Pt to be admitted to medicine, palliative consultation. Neurosurgery consulted for further recommendations.    Plan:  - No acute neurosurgical intervention at this time.  - Recommend obtaining MRI brain with and without contrast.  - Give 10mg IV dexamethasone in ED, followed by 4mg q 6 hours.  - Please call neurosurgery with any further questions or concerns.  - Discussed with Dr. D'Amico. HISTORY OF PRESENT ILLNESS:   74 year old female, PMH metastatic NSCLC, presenting to ED from nursing home due to progression of brain disease on outpatient imaging. Outpt MRI w/o contrast 5/13 showing progression of R sided brain met with edema and mild MLS and CT CAP showing worsening body disease as well. She was not able to tell me much about what brought her to the ED.    PAST MEDICAL & SURGICAL HISTORY:  HTN (hypertension)  HLD (hyperlipidemia)  DM (diabetes mellitus), type 2  Rheumatoid arthritis  Degenerative joint disease (DJD) of lumbar spine  Lung cancer metastatic to brain  Stage 3 chronic kidney disease  S/P total right hip arthroplasty    FAMILY HISTORY:    SOCIAL HISTORY:  Tobacco Use: unknown  EtOH use: unknown  Substance: unknown    Allergies    Bactrim (Rash)  citrus (Urticaria)  strawberry (Pruritus)    Intolerances    REVIEW OF SYSTEMS  unable to obtain due to mental status	    MEDICATIONS:  Antibiotics:    Neuro:    Anticoagulation:    OTHER:    IVF:      Vital Signs Last 24 Hrs  T(C): 36.8 (14 May 2025 19:59), Max: 36.8 (14 May 2025 19:59)  T(F): 98.2 (14 May 2025 19:59), Max: 98.2 (14 May 2025 19:59)  HR: 61 (14 May 2025 21:07) (60 - 61)  BP: 174/79 (14 May 2025 21:07) (174/79 - 184/93)  BP(mean): --  RR: 16 (14 May 2025 21:07) (16 - 18)  SpO2: 98% (14 May 2025 21:07) (98% - 98%)    Parameters below as of 14 May 2025 21:07  Patient On (Oxygen Delivery Method): room air      PHYSICAL EXAM:  Constitutional: NAD, resting comfortably in bed.   HEENT: Pupils equal, round, reactive to light. EOMI. Face symmetric, tongue midline.  Respiratory: No respiratory distress, lungs clear to auscultation bilaterally.   Cardiovascular: RRR, S1, S2.   Gastrointestinal: Abdomen soft, non tender, nondistended.  Neurological:  AAOX3. Opens eyes. Follows commands. Speech clear.  Cranial Nerves: II-XII intact  Motor: 4-/5 power in b/l UE and LE  Sensation: intact to light touch in all extremities  Pronator Drift: none  Dysmetria: not tested  Extremities: Warm, well perfused.  Wounds: n/a      LABS:                        11.0   7.31  )-----------( 188      ( 14 May 2025 20:28 )             35.0     05-14    144  |  107  |  57[H]  ----------------------------<  122[H]  3.8   |  24  |  2.45[H]    Ca    9.5      14 May 2025 20:28    TPro  7.9  /  Alb  3.9  /  TBili  <0.2  /  DBili  <0.1  /  AST  15  /  ALT  9[L]  /  AlkPhos  123[H]  05-14    PT/INR - ( 14 May 2025 20:28 )   PT: 12.4 sec;   INR: 1.06          PTT - ( 14 May 2025 20:28 )  PTT:27.6 sec  Urinalysis Basic - ( 14 May 2025 20:28 )    Color: x / Appearance: x / SG: x / pH: x  Gluc: 122 mg/dL / Ketone: x  / Bili: x / Urobili: x   Blood: x / Protein: x / Nitrite: x   Leuk Esterase: x / RBC: x / WBC x   Sq Epi: x / Non Sq Epi: x / Bacteria: x      CULTURES:      RADIOLOGY & ADDITIONAL STUDIES:    Assessment:  74 year old female, PMH metastatic NSCLC, presenting to ED from nursing home due to progression of brain disease on outpatient imaging. Outpt MRI w/o contrast 5/13 showing progression of R sided brain met with edema and mild MLS and CT CAP showing worsening body disease as well. Pt to be admitted to medicine, palliative consultation. Neurosurgery consulted for further recommendations.    Plan:  - No acute neurosurgical intervention at this time.  - Recommend obtaining MRI brain with and without contrast.  - Give 10mg IV dexamethasone in ED, followed by 2mg BID.   - Start PPI while on steroids.   - Please call neurosurgery with any further questions or concerns.  - Discussed with Dr. D'Amico.

## 2025-05-14 NOTE — ED ADULT NURSE NOTE - NSFALLRISKINTERV_ED_ALL_ED
Assistance OOB with selected safe patient handling equipment if applicable/Assistance with ambulation/Communicate fall risk and risk factors to all staff, patient, and family/Monitor gait and stability/Provide visual cue: yellow wristband, yellow gown, etc/Reinforce activity limits and safety measures with patient and family/Call bell, personal items and telephone in reach/Instruct patient to call for assistance before getting out of bed/chair/stretcher/Non-slip footwear applied when patient is off stretcher/[place] to call system/Physically safe environment - no spills, clutter or unnecessary equipment/Purposeful Proactive Rounding/Room/bathroom lighting operational, light cord in reach

## 2025-05-14 NOTE — ED ADULT TRIAGE NOTE - CHIEF COMPLAINT QUOTE
Pt BIB ambulance from [facility] reporting "They called her in because the MRI showed brain cancer got worse". A&Ox2 at baseline.

## 2025-05-14 NOTE — ED PROVIDER NOTE - PHYSICAL EXAMINATION
general: Well appearing, in no acute distress  HEENT: Normocephalic, atraumatic, extraocular movements intact  CV: Regular rate  Pulm: No respiratory distress, no tachypnea  Abd: Flat, no gross distension, soft, ntnd  Ext: warm and well perfused  Skin: No gross rashes or lesions  Neuro: Alert and oriented, moving all extremities

## 2025-05-14 NOTE — ED PROVIDER NOTE - OBJECTIVE STATEMENT
74 year old F with metastatic NSCLC previously progressed on platinum doublet + PD-1 inhibitor and currently off therapy owing to deconditioning after a hip fracture, now has imaging showing progression of a right-sided brain metastasis with vasogenic edema and mild midline shift, systemic progression, and CT findings suspicious for stercoral colitis by Dr. Jim, sent for admission for radiation, NSGY eval for steroids and admission, hospice eval. Pt without any acute complaints. No ha, no blurry vision, no cp or sob or abd pain, no d/c, no urinary complaints. ROS as above.

## 2025-05-15 ENCOUNTER — NON-APPOINTMENT (OUTPATIENT)
Age: 75
End: 2025-05-15

## 2025-05-15 DIAGNOSIS — C34.90 MALIGNANT NEOPLASM OF UNSPECIFIED PART OF UNSPECIFIED BRONCHUS OR LUNG: ICD-10-CM

## 2025-05-15 DIAGNOSIS — Z29.9 ENCOUNTER FOR PROPHYLACTIC MEASURES, UNSPECIFIED: ICD-10-CM

## 2025-05-15 DIAGNOSIS — Z51.5 ENCOUNTER FOR PALLIATIVE CARE: ICD-10-CM

## 2025-05-15 DIAGNOSIS — G93.40 ENCEPHALOPATHY, UNSPECIFIED: ICD-10-CM

## 2025-05-15 DIAGNOSIS — N17.9 ACUTE KIDNEY FAILURE, UNSPECIFIED: ICD-10-CM

## 2025-05-15 DIAGNOSIS — R56.9 UNSPECIFIED CONVULSIONS: ICD-10-CM

## 2025-05-15 DIAGNOSIS — E11.9 TYPE 2 DIABETES MELLITUS WITHOUT COMPLICATIONS: ICD-10-CM

## 2025-05-15 DIAGNOSIS — I16.0 HYPERTENSIVE URGENCY: ICD-10-CM

## 2025-05-15 DIAGNOSIS — Z71.89 OTHER SPECIFIED COUNSELING: ICD-10-CM

## 2025-05-15 DIAGNOSIS — K52.89 OTHER SPECIFIED NONINFECTIVE GASTROENTERITIS AND COLITIS: ICD-10-CM

## 2025-05-15 DIAGNOSIS — R53.81 OTHER MALAISE: ICD-10-CM

## 2025-05-15 DIAGNOSIS — N18.9 CHRONIC KIDNEY DISEASE, UNSPECIFIED: ICD-10-CM

## 2025-05-15 LAB
A1C WITH ESTIMATED AVERAGE GLUCOSE RESULT: 6.7 % — HIGH (ref 4–5.6)
ANION GAP SERPL CALC-SCNC: 15 MMOL/L — SIGNIFICANT CHANGE UP (ref 5–17)
APPEARANCE UR: CLEAR — SIGNIFICANT CHANGE UP
BILIRUB UR-MCNC: NEGATIVE — SIGNIFICANT CHANGE UP
BUN SERPL-MCNC: 53 MG/DL — HIGH (ref 7–23)
CALCIUM SERPL-MCNC: 9.6 MG/DL — SIGNIFICANT CHANGE UP (ref 8.4–10.5)
CHLORIDE SERPL-SCNC: 107 MMOL/L — SIGNIFICANT CHANGE UP (ref 96–108)
CO2 SERPL-SCNC: 19 MMOL/L — LOW (ref 22–31)
COLOR SPEC: YELLOW — SIGNIFICANT CHANGE UP
CREAT ?TM UR-MCNC: 51 MG/DL — SIGNIFICANT CHANGE UP
CREAT SERPL-MCNC: 1.93 MG/DL — HIGH (ref 0.5–1.3)
DIFF PNL FLD: ABNORMAL
EGFR: 27 ML/MIN/1.73M2 — LOW
EGFR: 27 ML/MIN/1.73M2 — LOW
ESTIMATED AVERAGE GLUCOSE: 146 MG/DL — HIGH (ref 68–114)
GLUCOSE SERPL-MCNC: 250 MG/DL — HIGH (ref 70–99)
GLUCOSE UR QL: 250 MG/DL
HCT VFR BLD CALC: 34.4 % — LOW (ref 34.5–45)
HGB BLD-MCNC: 10.7 G/DL — LOW (ref 11.5–15.5)
KETONES UR QL: NEGATIVE MG/DL — SIGNIFICANT CHANGE UP
LEUKOCYTE ESTERASE UR-ACNC: ABNORMAL
MAGNESIUM SERPL-MCNC: 1.5 MG/DL — LOW (ref 1.6–2.6)
MCHC RBC-ENTMCNC: 26.5 PG — LOW (ref 27–34)
MCHC RBC-ENTMCNC: 31.1 G/DL — LOW (ref 32–36)
MCV RBC AUTO: 85.1 FL — SIGNIFICANT CHANGE UP (ref 80–100)
NITRITE UR-MCNC: NEGATIVE — SIGNIFICANT CHANGE UP
NRBC BLD AUTO-RTO: 0 /100 WBCS — SIGNIFICANT CHANGE UP (ref 0–0)
OSMOLALITY UR: 507 MOSM/KG — SIGNIFICANT CHANGE UP (ref 300–900)
PH UR: 6.5 — SIGNIFICANT CHANGE UP (ref 5–8)
PHOSPHATE SERPL-MCNC: 3.3 MG/DL — SIGNIFICANT CHANGE UP (ref 2.5–4.5)
PLATELET # BLD AUTO: 178 K/UL — SIGNIFICANT CHANGE UP (ref 150–400)
POTASSIUM SERPL-MCNC: 3.8 MMOL/L — SIGNIFICANT CHANGE UP (ref 3.5–5.3)
POTASSIUM SERPL-SCNC: 3.8 MMOL/L — SIGNIFICANT CHANGE UP (ref 3.5–5.3)
POTASSIUM UR-SCNC: 35 MMOL/L — SIGNIFICANT CHANGE UP
PROT ?TM UR-MCNC: 296 MG/DL — HIGH (ref 0–12)
PROT UR-MCNC: 300 MG/DL
PROT/CREAT UR-RTO: 5.8 RATIO — HIGH (ref 0–0.2)
RBC # BLD: 4.04 M/UL — SIGNIFICANT CHANGE UP (ref 3.8–5.2)
RBC # FLD: 15.8 % — HIGH (ref 10.3–14.5)
SODIUM SERPL-SCNC: 141 MMOL/L — SIGNIFICANT CHANGE UP (ref 135–145)
SODIUM UR-SCNC: 93 MMOL/L — SIGNIFICANT CHANGE UP
SP GR SPEC: 1.02 — SIGNIFICANT CHANGE UP (ref 1–1.03)
UROBILINOGEN FLD QL: 0.2 MG/DL — SIGNIFICANT CHANGE UP (ref 0.2–1)
UUN UR-MCNC: 686 MG/DL — SIGNIFICANT CHANGE UP
WBC # BLD: 6.32 K/UL — SIGNIFICANT CHANGE UP (ref 3.8–10.5)
WBC # FLD AUTO: 6.32 K/UL — SIGNIFICANT CHANGE UP (ref 3.8–10.5)

## 2025-05-15 PROCEDURE — 99222 1ST HOSP IP/OBS MODERATE 55: CPT

## 2025-05-15 PROCEDURE — 99223 1ST HOSP IP/OBS HIGH 75: CPT | Mod: AI

## 2025-05-15 PROCEDURE — 99223 1ST HOSP IP/OBS HIGH 75: CPT

## 2025-05-15 RX ORDER — DEXTROSE 50 % IN WATER 50 %
12.5 SYRINGE (ML) INTRAVENOUS ONCE
Refills: 0 | Status: DISCONTINUED | OUTPATIENT
Start: 2025-05-15 | End: 2025-05-19

## 2025-05-15 RX ORDER — SODIUM CHLORIDE 9 G/1000ML
1000 INJECTION, SOLUTION INTRAVENOUS
Refills: 0 | Status: DISCONTINUED | OUTPATIENT
Start: 2025-05-15 | End: 2025-05-19

## 2025-05-15 RX ORDER — POLYETHYLENE GLYCOL 3350 17 G/17G
17 POWDER, FOR SOLUTION ORAL EVERY 24 HOURS
Refills: 0 | Status: DISCONTINUED | OUTPATIENT
Start: 2025-05-15 | End: 2025-05-19

## 2025-05-15 RX ORDER — LEVETIRACETAM 10 MG/ML
500 INJECTION, SOLUTION INTRAVENOUS EVERY 12 HOURS
Refills: 0 | Status: DISCONTINUED | OUTPATIENT
Start: 2025-05-15 | End: 2025-05-19

## 2025-05-15 RX ORDER — DEXTROSE 50 % IN WATER 50 %
25 SYRINGE (ML) INTRAVENOUS ONCE
Refills: 0 | Status: DISCONTINUED | OUTPATIENT
Start: 2025-05-15 | End: 2025-05-19

## 2025-05-15 RX ORDER — INSULIN GLARGINE-YFGN 100 [IU]/ML
10 INJECTION, SOLUTION SUBCUTANEOUS AT BEDTIME
Refills: 0 | Status: DISCONTINUED | OUTPATIENT
Start: 2025-05-15 | End: 2025-05-15

## 2025-05-15 RX ORDER — LEVETIRACETAM 10 MG/ML
500 INJECTION, SOLUTION INTRAVENOUS EVERY 12 HOURS
Refills: 0 | Status: DISCONTINUED | OUTPATIENT
Start: 2025-05-15 | End: 2025-05-15

## 2025-05-15 RX ORDER — BISACODYL 5 MG
10 TABLET, DELAYED RELEASE (ENTERIC COATED) ORAL DAILY
Refills: 0 | Status: DISCONTINUED | OUTPATIENT
Start: 2025-05-15 | End: 2025-05-19

## 2025-05-15 RX ORDER — MAGNESIUM SULFATE 500 MG/ML
2 SYRINGE (ML) INJECTION ONCE
Refills: 0 | Status: COMPLETED | OUTPATIENT
Start: 2025-05-15 | End: 2025-05-15

## 2025-05-15 RX ORDER — GLUCAGON 3 MG/1
1 POWDER NASAL ONCE
Refills: 0 | Status: DISCONTINUED | OUTPATIENT
Start: 2025-05-15 | End: 2025-05-19

## 2025-05-15 RX ORDER — DEXTROSE 50 % IN WATER 50 %
15 SYRINGE (ML) INTRAVENOUS ONCE
Refills: 0 | Status: DISCONTINUED | OUTPATIENT
Start: 2025-05-15 | End: 2025-05-19

## 2025-05-15 RX ORDER — DEXAMETHASONE 0.5 MG/1
4 TABLET ORAL EVERY 6 HOURS
Refills: 0 | Status: DISCONTINUED | OUTPATIENT
Start: 2025-05-15 | End: 2025-05-15

## 2025-05-15 RX ORDER — INSULIN LISPRO 100 U/ML
INJECTION, SOLUTION INTRAVENOUS; SUBCUTANEOUS
Refills: 0 | Status: DISCONTINUED | OUTPATIENT
Start: 2025-05-15 | End: 2025-05-19

## 2025-05-15 RX ORDER — DEXAMETHASONE 0.5 MG/1
2 TABLET ORAL EVERY 12 HOURS
Refills: 0 | Status: DISCONTINUED | OUTPATIENT
Start: 2025-05-15 | End: 2025-05-19

## 2025-05-15 RX ORDER — HEPARIN SODIUM 1000 [USP'U]/ML
5000 INJECTION INTRAVENOUS; SUBCUTANEOUS EVERY 8 HOURS
Refills: 0 | Status: DISCONTINUED | OUTPATIENT
Start: 2025-05-15 | End: 2025-05-17

## 2025-05-15 RX ORDER — NIFEDIPINE 30 MG
90 TABLET, EXTENDED RELEASE 24 HR ORAL EVERY 24 HOURS
Refills: 0 | Status: COMPLETED | OUTPATIENT
Start: 2025-05-15 | End: 2025-05-15

## 2025-05-15 RX ORDER — INSULIN LISPRO 100 U/ML
INJECTION, SOLUTION INTRAVENOUS; SUBCUTANEOUS AT BEDTIME
Refills: 0 | Status: DISCONTINUED | OUTPATIENT
Start: 2025-05-15 | End: 2025-05-19

## 2025-05-15 RX ORDER — NIFEDIPINE 30 MG
90 TABLET, EXTENDED RELEASE 24 HR ORAL EVERY 24 HOURS
Refills: 0 | Status: DISCONTINUED | OUTPATIENT
Start: 2025-05-15 | End: 2025-05-19

## 2025-05-15 RX ORDER — INSULIN GLARGINE-YFGN 100 [IU]/ML
10 INJECTION, SOLUTION SUBCUTANEOUS AT BEDTIME
Refills: 0 | Status: DISCONTINUED | OUTPATIENT
Start: 2025-05-15 | End: 2025-05-19

## 2025-05-15 RX ORDER — SENNA 187 MG
2 TABLET ORAL EVERY 12 HOURS
Refills: 0 | Status: DISCONTINUED | OUTPATIENT
Start: 2025-05-15 | End: 2025-05-19

## 2025-05-15 RX ORDER — NIFEDIPINE 30 MG
90 TABLET, EXTENDED RELEASE 24 HR ORAL EVERY 24 HOURS
Refills: 0 | Status: DISCONTINUED | OUTPATIENT
Start: 2025-05-15 | End: 2025-05-15

## 2025-05-15 RX ADMIN — LEVETIRACETAM 400 MILLIGRAM(S): 10 INJECTION, SOLUTION INTRAVENOUS at 17:07

## 2025-05-15 RX ADMIN — LEVETIRACETAM 400 MILLIGRAM(S): 10 INJECTION, SOLUTION INTRAVENOUS at 02:49

## 2025-05-15 RX ADMIN — Medication 100 MILLILITER(S): at 02:49

## 2025-05-15 RX ADMIN — Medication 2 TABLET(S): at 02:53

## 2025-05-15 RX ADMIN — INSULIN LISPRO 8: 100 INJECTION, SOLUTION INTRAVENOUS; SUBCUTANEOUS at 17:31

## 2025-05-15 RX ADMIN — Medication 10 MILLIGRAM(S): at 06:28

## 2025-05-15 RX ADMIN — DEXAMETHASONE 2 MILLIGRAM(S): 0.5 TABLET ORAL at 13:42

## 2025-05-15 RX ADMIN — INSULIN LISPRO 4: 100 INJECTION, SOLUTION INTRAVENOUS; SUBCUTANEOUS at 21:30

## 2025-05-15 RX ADMIN — Medication 5 MILLIGRAM(S): at 02:49

## 2025-05-15 RX ADMIN — Medication 2 TABLET(S): at 17:07

## 2025-05-15 RX ADMIN — Medication 40 MILLIGRAM(S): at 06:28

## 2025-05-15 RX ADMIN — DEXAMETHASONE 4 MILLIGRAM(S): 0.5 TABLET ORAL at 02:53

## 2025-05-15 RX ADMIN — Medication 10 MILLIGRAM(S): at 08:44

## 2025-05-15 RX ADMIN — Medication 90 MILLIGRAM(S): at 04:22

## 2025-05-15 RX ADMIN — INSULIN GLARGINE-YFGN 10 UNIT(S): 100 INJECTION, SOLUTION SUBCUTANEOUS at 21:30

## 2025-05-15 RX ADMIN — INSULIN LISPRO 4: 100 INJECTION, SOLUTION INTRAVENOUS; SUBCUTANEOUS at 06:44

## 2025-05-15 RX ADMIN — HEPARIN SODIUM 5000 UNIT(S): 1000 INJECTION INTRAVENOUS; SUBCUTANEOUS at 13:42

## 2025-05-15 RX ADMIN — POLYETHYLENE GLYCOL 3350 17 GRAM(S): 17 POWDER, FOR SOLUTION ORAL at 02:53

## 2025-05-15 RX ADMIN — HEPARIN SODIUM 5000 UNIT(S): 1000 INJECTION INTRAVENOUS; SUBCUTANEOUS at 06:28

## 2025-05-15 RX ADMIN — Medication 25 GRAM(S): at 09:27

## 2025-05-15 RX ADMIN — Medication 90 MILLIGRAM(S): at 21:31

## 2025-05-15 RX ADMIN — HEPARIN SODIUM 5000 UNIT(S): 1000 INJECTION INTRAVENOUS; SUBCUTANEOUS at 21:29

## 2025-05-15 RX ADMIN — INSULIN LISPRO 8: 100 INJECTION, SOLUTION INTRAVENOUS; SUBCUTANEOUS at 13:42

## 2025-05-15 NOTE — ED PROVIDER NOTE - IN ACCORDANCE WITH [place] LAW, WE OFFER EVERY PATIENT A HEPATITIS C TEST. WOULD YOU LIKE TO BE TESTED TODAY?
Opt out Patient with history of COPD. On home symbicort inhaler, however reports that he does not take it because it makes him short of breath and has not picked up from pharmacy. Given duonebs x3 in ED, currently without wheezing on physical exam. Does not appear to be in exacerbation, no increased cough, shortness of breath, sputum production.  - continue with home med  - duonebs PRN

## 2025-05-15 NOTE — H&P ADULT - CONVERSATION DETAILS
Spoke to patient's daughter Chloe Minor. Chloe states that since February patient's mental status has rapidly decline, more confused and has become very weak. Patient was able to previously sign her own consent but now patient has difficulty following basic commands.     I discussed with Chloe that the MRI brain shows worsening of her cancer which is likely contributing to her mom's rapidly decreasing mental status and confusion. Discussed with patient that after reading Dr. Jim's outpatient notes there is limited treatments for her mom in regards to curing her cancer and at this time it would be best to prioritize patient's comfort and to decrease any pain. Discussed with patient that she is a candidate for hospice care and hospice would focus on her mom's comfort and improve any remaining quality of life. Patient is open to this however she would need to discuss it with her aunt.    I also discussed code status with Chloe. She informs me that previously her mom wanted CPR/DNI. I informed patient that being intubated is part of the resuscitation efforts and if her mom would want CPR then she would have to get intubated and put on a ventilator to help support her breathing. I discussed with patient that while CPR is a life saving measure, it is done so with the intention of returning to form of quality of life. Informed Chloe that if CPR is performed, it is unlikely that she will return to a meaningful quality of life given her cancer history. I also explained to Chloe that there are risk with CPR such as rib fractures and puncture lungs. Chloe would like to discuss code status with her aunt.

## 2025-05-15 NOTE — CONSULT NOTE ADULT - NS ATTEND AMEND GEN_ALL_CORE FT
I evaluated the patient with the hematology oncology physician assistant, Steffi Hernandez.  The patient has metastatic non small cell lung cancer and is admitted for symptomatic brain metastasis.  Imaging has also shown progression of hepatic and pulmonary metastatic disease.  On evaluation today the patient is somnolent but arousable;  minimally conversant. Impression is that she is seriously ill with limited life expectancy.  Case discussed w/ palliative medicine (Dr Tapia) and Internal medicine hospitalist (Dr Herrera).  Considering the clinical situation, a benefit from palliative radiation seems unlikely and may be more appropriate to proceed w/ hospice care.  Continue palliative glucocorticoids for management of tumor-related cerebral edema.

## 2025-05-15 NOTE — PATIENT PROFILE ADULT - FUNCTIONAL ASSESSMENT - DAILY ACTIVITY 1.
"[facility]   Physical Therapy Treatment Note      Name: Chelsea Rodriguez  Clinic Number: [id number]  Therapy Diagnosis:   Encounter Diagnoses   Name Primary?    Bilateral chronic knee pain Yes    Decreased mobility of joint     Muscle weakness of lower extremity      Physician: Elaine Kraus MD  Visit Date: 2/7/2024  Physician: Elaine Kraus MD  Visit Date: 1/19/2024  Evaluation Date: 12/5/2023  Authorization Period Expiration: [id number]  Plan of Care Expiration: 2/5/24 **Update to 2/29/2024  Progress Note Due: 2/19/2023  Most Recent Progress Note: 1/19/2023  Date of Surgery: None  Visit # / Visits authorized: 9/15  FOTO: 1/ 3  Precautions: Standard  Time In: 10:00 am  Time Out: 11:00 am  Total Billable Time: 60 minutes (60 minutes one on one)  Subjective   Pt reports her knees were feeling sore after her exercises, but she states this was a combination of joint pain and muscle soreness after a "good workout."  She was not compliant with home exercise program.  Response to previous treatment: initial eval   Functional change: none   Pain: 3/10  Location: Bilateral knee    Objective      Treatment     Chelsea received the treatments listed below:      therapeutic exercises to develop strength, endurance, and ROM for 45 minutes including:  NuStep, 8 minutes for range of motion   Double Leg Press on Shuttle Isometric 10x6" 200#  Double Leg Press on Shuttle, 2x10" 62.5# (25# loaded from the bottom)  Shuttle Single Leg Press, 25# on the right,  37.5# on the left, 2x10 each side    Manual Therapy for 0 minutes:    Chelsea participated in dynamic functional therapeutic activities to improve functional performance for10 minutes, including:  Sitting to Standing, 18" surface + 2" riser, Hip-Hinging , 10x (2x15 holding 5# KB)  Step-Up, 4" step, right side, x 12, bilateral hand hold  Patient Education and Home Exercises       Education provided:   -Findings of evaluation and examination, and affect of " "these on plan for treatment  -Prognosis and expectations  -Role of PT and team-centered care for patient  -Home exercise program and expectations of therapy     Written Home Exercises Provided: yes. Exercises were reviewed and Chelsea was able to demonstrate them prior to the end of the session.  Chelsea demonstrated good  understanding of the education provided. See EMR under Patient Instructions for exercises provided during therapy sessions.    Assessment   Chelsea demonstrates palpable and audible "clicking" of the knee during step-up on 4" and 6" step, however it does not occur while stepping up on 2" step. She is unable to perform straight leg raise without increased pain and crepitus of the right knee. She is able to rise from a chair with less trunk flexion and rise on 4" step with less pain.    Chelsea Is progressing well towards her goals.   Pt prognosis is Good.     Pt will continue to benefit from skilled outpatient physical therapy to address the deficits listed in the problem list box on initial evaluation, provide pt/family education and to maximize pt's level of independence in the home and community environment.     Pt's spiritual, cultural and educational needs considered and pt agreeable to plan of care and goals.     Anticipated barriers to physical therapy: Chronicity, History of Previous Surgeries      Goals:      Short Term Goals: 2 weeks   1.) Patient will demonstrate independence in compliance and technique of home exercise program provided as per teach-back method of assessment. Ongoing  2.) Patient will achieve 0-90 degrees of knee flexion to demonstrate progressing range of motion for improved functional mobility. Met  3.) Patient will ambulate for 200 feet with LRAD with supervision-level assistance and normalized gait pattern.  Ongoing  4.) Patient will demonstrate good quality quadriceps set with the ability to complete 10x straight-leg raises without quadriceps lag on right side.  Ongoing   "   Long Term Goals: 6 weeks   1.) Patient will demonstrate independence in compliance and technique of home exercise program provided as per teach-back method of assessment. Ongoing  2.) Patient will achieve 0-100 degrees of knee flexion to demonstrate progressing range of motion for improved functional mobility. Met on the right, ongoing on the left  3.) Patient will ambulate for 200 feet without any assistive device with supervision-level assistance and normalized gait pattern. Met  4.) Patient will demonstrate good quality quadriceps set with the ability to complete 3x10 straight-leg raises without quadriceps lag.  Ongoing  5.) Patient will demonstrate <14 seconds as per TUG Test to demonstrate improved functional mobility and decreased fall risk. Met  6.) Patient will demonstrate 5xSTS Test in <16 seconds  to demonstrate improved functional mobility and decreased fall risk. Met  7.) Patient will note <10% disability as per FOTO score. Ongoing    Plan     Continue with Physical Therapist Plan of Care.  **Update to 2/29/2024    Preethi Patel, PT DPT  Board Certified in Orthopedic Physical Therapy       2 = A lot of assistance

## 2025-05-15 NOTE — H&P ADULT - PROBLEM SELECTOR PLAN 3
Hx of seizure after hip surgery during last admission. Unclear of keppra dose. Per surescript 500mg q12.     Plan:  - Start Keppra 500mg IV q12.

## 2025-05-15 NOTE — H&P ADULT - PROBLEM SELECTOR PLAN 2
Patient w/ elevated BP of 184/93 followed by repeat SBP in 200s.   Unclear of home medication, possibly amlodipine per daughter    Plan:  - Formal med rec in AM w/ [facility]  - f/u BS  - Hydralazine 5mg IV prn. Patient w/ elevated BP of 184/93 followed by repeat SBP in 200s.   Unclear of home medication, possibly amlodipine per daughter    Plan:  - Formal med rec in AM w/ [facility]  - f/u BS  - Hydralazine 5mg IV w/ SBP goal of 160-180 Patient w/ elevated BP of 184/93 followed by repeat SBP in 200s.   Unclear of home medication, possibly amlodipine per daughter    Plan:  - Formal med rec in AM w/ [facility]  - Start nifedipine 90 mg qd

## 2025-05-15 NOTE — CONSULT NOTE ADULT - PROBLEM SELECTOR RECOMMENDATION 2
Progression of brain mets and worsening edema with some mild herniation seen on MRI.  - Dexamethasone per neurosurgery  - On keppra 500mg IV BID for seizure ppx

## 2025-05-15 NOTE — H&P ADULT - PROBLEM SELECTOR PLAN 4
Hx of DM. Per daughter patient maybe on Lantus 10u and humalog 12u.    Plan:  - q6 FS  - mSSI.  - Formal med rec in AM on [facility] regiment  - Bridge w/ [facility] 10u. Outpatient CTAP showing sterocoral colitis.   - Started on senna and miralax.
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
## 2025-05-15 NOTE — CONSULT NOTE ADULT - TIME BILLING
Emotional Support/Supportive Care and Clarification of Potential Disease Trajectory related to metastatic cancer.  Exploration of GOC including advanced directives such as HCP designation and code status.  Discharge Facilitation with ongoing coordination.

## 2025-05-15 NOTE — PATIENT PROFILE ADULT - FALL HARM RISK - HARM RISK INTERVENTIONS

## 2025-05-15 NOTE — CONSULT NOTE ADULT - ASSESSMENT
The patient is a 74-year-old woman with metastatic [id number] NSCLC (STK11, PIK3CA, TP53 mutations), currently residing at [facility]. Her disease has progressed on prior therapy, and she is now post-op from right hip surgery with worsening functional status, including significant deconditioning and word-finding difficulty. Recent MRb (outpatient) w/ Interval significant increase in size of lesions in the brain with significant edema. Small amount of right to left subfalcine herniation associated with the largest right frontal lesion. CT CAP w/ Since 10/14/2024, there has been an increase in the size of the left upper lobe mass. A component of this mass may represent postobstructive atelectasis.  Increase in size of liver mass. Patient sent to ED for further evaluation/workup.      Spoke with patients daughter regarding patients current clinical status and re-discussed recent scan results significant for POD.  Discussed that patient is not a candidate for cancer directed therapy at this time and oncology recommendation for hospice services.  Chloe agreed and would like to pursue palliative radiation should it be offered to her mother and wants more information regarding inpatient vs home hospice services after possible radiation.  Relayed information to palliative care, neurosurgery and primary team.      plan  --follow up rad onc plan for possible palliative radiation, steroid plan per rad onc   --appreciate palliative care involvement  --further discussions with CM/SW regarding home vs inpatient hospice   The patient is a 74-year-old woman with metastatic [id number] NSCLC (STK11, PIK3CA, TP53 mutations), currently residing at [facility]. Her disease has progressed on prior therapy, and she is now post-op from right hip surgery with worsening functional status, including significant deconditioning and word-finding difficulty. Recent MRb (outpatient) w/ Interval significant increase in size of lesions in the brain with significant edema. Small amount of right to left subfalcine herniation associated with the largest right frontal lesion. CT CAP w/ Since 10/14/2024, there has been an increase in the size of the left upper lobe mass. A component of this mass may represent postobstructive atelectasis.  Increase in size of liver mass. Patient sent to ED for further evaluation/workup.      Spoke with patients daughter regarding patients current clinical status and re-discussed recent scan results significant for POD.  Discussed that patient is not a candidate for cancer directed therapy at this time and oncology recommendation for hospice services.  Chloe agreed and would like to pursue palliative radiation should it be offered to her mother and wants more information regarding inpatient vs home hospice services after possible radiation.  Relayed information to palliative care, neurosurgery and primary team.      Of note, Patients daughter HCP, Chloe, has agreed to DNR/DNI status.  All questions answered.     plan  --follow up rad onc plan for possible palliative radiation, steroid plan per rad onc   --appreciate palliative care involvement  --further discussions with CM/SW regarding home vs inpatient hospice

## 2025-05-15 NOTE — H&P ADULT - NSHPPHYSICALEXAM_GEN_ALL_CORE
T(C): 36.8 (05-14-25 @ 19:59), Max: 36.8 (05-14-25 @ 19:59)  HR: 61 (05-14-25 @ 21:07) (60 - 61)  BP: 174/79 (05-14-25 @ 21:07) (174/79 - 184/93)  RR: 16 (05-14-25 @ 21:07) (16 - 18)  SpO2: 98% (05-14-25 @ 21:07) (98% - 98%)    General: NAD, laying in bed. Somnolent   HEENT: Pupils equal round and reactive to light. MMM  Neck: supple  Cardio: RRR, +S1/S2, no M/R/G  Resp: lungs CTAB, no cough/wheezes/rales/rhonchi  Abdo: soft, NT, ND, +bowel sounds x4, no organomegaly or palpable mass    Extremities: WWP, no edema  Vasc: 2+ radial and DP pulses b/l  Neuro: AOx0. Does not follow commands, responds to pain.   Skin: dry, intact, no visible jaundice   MSK: no joint swelling

## 2025-05-15 NOTE — H&P ADULT - PROBLEM SELECTOR PLAN 5
Hx of CKD. Cr in 02/2025 ~1.5. Now ~2.4. Could be iso poor PO intake.    Plan:  - Avoid nephrotoxic agents  - f/u Urine studies  - Start Maintenance IVF 100cc/hr Hx of DM. Per daughter patient maybe on Lantus 10u and humalog 12u.    Plan:  - q6 FS  - mSSI.  - Currently NPO  - Monitor FS  - Formal med rec in AM w/ NH.

## 2025-05-15 NOTE — H&P ADULT - NSHPLABSRESULTS_GEN_ALL_CORE
.  LABS:                         11.0   7.31  )-----------( 188      ( 14 May 2025 20:28 )             35.0     05-14    144  |  107  |  57[H]  ----------------------------<  122[H]  3.8   |  24  |  2.45[H]    Ca    9.5      14 May 2025 20:28    TPro  7.9  /  Alb  3.9  /  TBili  <0.2  /  DBili  <0.1  /  AST  15  /  ALT  9[L]  /  AlkPhos  123[H]  05-14    PT/INR - ( 14 May 2025 20:28 )   PT: 12.4 sec;   INR: 1.06          PTT - ( 14 May 2025 20:28 )  PTT:27.6 sec  Urinalysis Basic - ( 14 May 2025 20:28 )    Color: x / Appearance: x / SG: x / pH: x  Gluc: 122 mg/dL / Ketone: x  / Bili: x / Urobili: x   Blood: x / Protein: x / Nitrite: x   Leuk Esterase: x / RBC: x / WBC x   Sq Epi: x / Non Sq Epi: x / Bacteria: x                RADIOLOGY, EKG & ADDITIONAL TESTS: Reviewed.

## 2025-05-15 NOTE — PATIENT PROFILE ADULT - FALL HARM RISK - FALLEN IN PAST
noted on a previous admission, no date of fall included/Accidental fall/Multiple falls in time period

## 2025-05-15 NOTE — CONSULT NOTE ADULT - SUBJECTIVE AND OBJECTIVE BOX
73yo F with PMH metastatic NSCLC, presenting to ED from nursing home due to progression of brain disease on outpatient imaging. Outpatient MRI w/o contrast 5/13 showing progression of R sided brain met with edema and mild MLS and CT CAP showing worsening liver and lung disease as well. She was sent in for further evaluation.      Patient seen and examined at bedside, A&Ox0, minimally conversive.  Spoke with patients daughter regarding patients current clinical status and re-discussed recent scan results significant for POD.  Discussed that patient is not a candidate for cancer directed therapy at this time and oncology recommendation for hospice services.  Chloe agreed and would like to pursue palliative radiation should it be offered to her mother and wants more information regarding inpatient vs home hospice services after possible radiation.  Relayed information to palliative care, neurosurgery and primary team.        oncology history:   She was initially diagnosed after presenting to the hospital with falls and was found to have a 0.9 cm right frontal lobe mass with surrounding vasogenic edema, a 2.6 4.1 cm posterior left upper lobe mass, and a 1.5 cm left hepatic lobe lesion. A biopsy on 7/14/2023 confirmed poorly differentiated adenocarcinoma. Tumor mutation burden (TMB) was 17.3, and PD-L1 was negative.  ?  She established care at [facility] in August 2023 and began treatment with paclitaxel and pembrolizumab on 9/7/2023, initially without carboplatin due to a shortage. Carboplatin was added in cycle 2 on 10/26/2023. After completing four cycles, she transitioned to pembrolizumab maintenance alone. Surveillance CT imaging in June 2024 showed progressive disease, and she subsequently began cycle 1 of gemcitabine on 7/11/2024. She also received SRS to brain metastases on 7/23/2024. Her treatment course has been complicated by multiple delays, primarily due to hyperglycemia or other acute medical issues.  ?  The patient was admitted from 2/18/2025 to 2/25/2025 for medical optimization prior to right hip surgery. Postoperatively, she experienced a seizure and was started on levetiracetam. MRI brain on 2/23/2025 was markedly limited due to motion but showed no gross disease.  ?  She currently resides at [facility]. Functionally, she spends most of her time in bed or in her wheelchair. She has also been having difficulty with speech, appearing to have word-finding difficulty.     Disease: NSCLC    Pathology: AdenoCA    Tumor Markers: STK11, PIK3CA, TP53, VUS in ALK, PDL1 negative     [place] [place]  NeuroSx: Dr.D'Amico  [facility]: Dr. Wernicke    ANTIBIOTICS/RELEVANT:  MEDICATIONS  (STANDING):  bisacodyl Suppository 10 milliGRAM(s) Rectal daily  dexAMETHasone  Injectable 2 milliGRAM(s) IV Push every 12 hours  dextrose 5%. 1000 milliLiter(s) (50 mL/Hr) IV Continuous <Continuous>  dextrose 5%. 1000 milliLiter(s) (100 mL/Hr) IV Continuous <Continuous>  dextrose 50% Injectable 25 Gram(s) IV Push once  dextrose 50% Injectable 12.5 Gram(s) IV Push once  dextrose 50% Injectable 25 Gram(s) IV Push once  glucagon  Injectable 1 milliGRAM(s) IntraMuscular once  heparin   Injectable 5000 Unit(s) SubCutaneous every 8 hours  insulin lispro (ADMELOG) corrective regimen sliding scale   SubCutaneous three times a day before meals  insulin lispro (ADMELOG) corrective regimen sliding scale   SubCutaneous at bedtime  levETIRAcetam  IVPB 500 milliGRAM(s) IV Intermittent every 12 hours  NIFEdipine XL 90 milliGRAM(s) Oral every 24 hours  pantoprazole  Injectable 40 milliGRAM(s) IV Push every 24 hours  polyethylene glycol 3350 17 Gram(s) Oral every 24 hours  senna 2 Tablet(s) Oral every 12 hours    MEDICATIONS  (PRN):  dextrose Oral Gel 15 Gram(s) Oral once PRN Blood Glucose LESS THAN 70 milliGRAM(s)/deciliter    Vital Signs Last 24 Hrs  T(C): 36.4 (15 May 2025 11:59), Max: 36.8 (14 May 2025 19:59)  T(F): 97.6 (15 May 2025 11:59), Max: 98.2 (14 May 2025 19:59)  HR: 97 (15 May 2025 11:59) (58 - 108)  BP: 162/83 (15 May 2025 11:59) (146/77 - 233/88)  BP(mean): 119 (15 May 2025 08:42) (119 - 133)  RR: 18 (15 May 2025 11:59) (16 - 97)  SpO2: 98% (15 May 2025 11:59) (97% - 99%)    Parameters below as of 15 May 2025 11:59  Patient On (Oxygen Delivery Method): room air    PHYSICAL EXAM:  General: in no acute distress  Lungs: CTA B/L. No wheezes, rales, or rhonchi  Cardiovascular: RRR. No murmurs, rubs, or gallops  Abdomen: Soft, non-tender non-distended; No rebound or guarding  Extremities: WWP, No clubbing, cyanosis or edema  Neurological: Alert and oriented x0     LABS:                        10.7   6.32  )-----------( 178      ( 15 May 2025 05:30 )             34.4     05-15    141  |  107  |  53[H]  ----------------------------<  250[H]  3.8   |  19[L]  |  1.93[H]    Ca    9.6      15 May 2025 05:30  Phos  3.3     05-15  Mg     1.5     05-15    TPro  7.9  /  Alb  3.9  /  TBili  <0.2  /  DBili  <0.1  /  AST  15  /  ALT  9[L]  /  AlkPhos  123[H]  05-14    PT/INR - ( 14 May 2025 20:28 )   PT: 12.4 sec;   INR: 1.06     MICROBIOLOGY:    RADIOLOGY & ADDITIONAL STUDIES:   73yo F with PMH metastatic NSCLC, presenting to ED from nursing home due to progression of brain disease on outpatient imaging. Outpatient MRI w/o contrast 5/13 showing progression of R sided brain met with edema and mild MLS and CT CAP showing worsening liver and lung disease as well. She was sent in for further evaluation.      Patient seen and examined at bedside, A&Ox0, minimally conversive.  Spoke with patients daughter regarding patients current clinical status and re-discussed recent scan results significant for POD.  Discussed that patient is not a candidate for cancer directed therapy at this time and oncology recommendation for hospice services.  Chloe agreed and would like to pursue palliative radiation should it be offered to her mother and wants more information regarding inpatient vs home hospice services after possible radiation.  Relayed information to palliative care, neurosurgery and primary team.        oncology history:   She was initially diagnosed after presenting to the hospital with falls and was found to have a 0.9 cm right frontal lobe mass with surrounding vasogenic edema, a 2.6 4.1 cm posterior left upper lobe mass, and a 1.5 cm left hepatic lobe lesion. A biopsy on 7/14/2023 confirmed poorly differentiated adenocarcinoma. Tumor mutation burden (TMB) was 17.3, and PD-L1 was negative.  ?  She established care at [facility] in August 2023 and began treatment with paclitaxel and pembrolizumab on 9/7/2023, initially without carboplatin due to a shortage. Carboplatin was added in cycle 2 on 10/26/2023. After completing four cycles, she transitioned to pembrolizumab maintenance alone. Surveillance CT imaging in June 2024 showed progressive disease, and she subsequently began cycle 1 of gemcitabine on 7/11/2024. She also received SRS to brain metastases on 7/23/2024. Her treatment course has been complicated by multiple delays, primarily due to hyperglycemia or other acute medical issues.  ?  The patient was admitted from 2/18/2025 to 2/25/2025 for medical optimization prior to right hip surgery. Postoperatively, she experienced a seizure and was started on levetiracetam. MRI brain on 2/23/2025 was markedly limited due to motion but showed no gross disease.  ?  She currently resides at [facility]. Functionally, she spends most of her time in bed or in her wheelchair. She has also been having difficulty with speech, appearing to have word-finding difficulty.     Disease: NSCLC    Pathology: AdenoCA    Tumor Markers: STK11, PIK3CA, TP53, VUS in ALK, PDL1 negative     [place] [place] Dr.Rosa Jeffrey  NeuroSx: Dr.D'Amico  [facility]: Dr. Wernicke    ANTIBIOTICS/RELEVANT:  MEDICATIONS  (STANDING):  bisacodyl Suppository 10 milliGRAM(s) Rectal daily  dexAMETHasone  Injectable 2 milliGRAM(s) IV Push every 12 hours  dextrose 5%. 1000 milliLiter(s) (50 mL/Hr) IV Continuous <Continuous>  dextrose 5%. 1000 milliLiter(s) (100 mL/Hr) IV Continuous <Continuous>  dextrose 50% Injectable 25 Gram(s) IV Push once  dextrose 50% Injectable 12.5 Gram(s) IV Push once  dextrose 50% Injectable 25 Gram(s) IV Push once  glucagon  Injectable 1 milliGRAM(s) IntraMuscular once  heparin   Injectable 5000 Unit(s) SubCutaneous every 8 hours  insulin lispro (ADMELOG) corrective regimen sliding scale   SubCutaneous three times a day before meals  insulin lispro (ADMELOG) corrective regimen sliding scale   SubCutaneous at bedtime  levETIRAcetam  IVPB 500 milliGRAM(s) IV Intermittent every 12 hours  NIFEdipine XL 90 milliGRAM(s) Oral every 24 hours  pantoprazole  Injectable 40 milliGRAM(s) IV Push every 24 hours  polyethylene glycol 3350 17 Gram(s) Oral every 24 hours  senna 2 Tablet(s) Oral every 12 hours    MEDICATIONS  (PRN):  dextrose Oral Gel 15 Gram(s) Oral once PRN Blood Glucose LESS THAN 70 milliGRAM(s)/deciliter    Vital Signs Last 24 Hrs  T(C): 36.4 (15 May 2025 11:59), Max: 36.8 (14 May 2025 19:59)  T(F): 97.6 (15 May 2025 11:59), Max: 98.2 (14 May 2025 19:59)  HR: 97 (15 May 2025 11:59) (58 - 108)  BP: 162/83 (15 May 2025 11:59) (146/77 - 233/88)  BP(mean): 119 (15 May 2025 08:42) (119 - 133)  RR: 18 (15 May 2025 11:59) (16 - 97)  SpO2: 98% (15 May 2025 11:59) (97% - 99%)    Parameters below as of 15 May 2025 11:59  Patient On (Oxygen Delivery Method): room air    PHYSICAL EXAM:  General: in no acute distress  Lungs: CTA B/L. No wheezes, rales, or rhonchi  Cardiovascular: RRR. No murmurs, rubs, or gallops  Abdomen: Soft, non-tender non-distended; No rebound or guarding  Extremities: WWP, No clubbing, cyanosis or edema  Neurological: Alert and oriented x0     LABS:                        10.7   6.32  )-----------( 178      ( 15 May 2025 05:30 )             34.4     05-15    141  |  107  |  53[H]  ----------------------------<  250[H]  3.8   |  19[L]  |  1.93[H]    Ca    9.6      15 May 2025 05:30  Phos  3.3     05-15  Mg     1.5     05-15    TPro  7.9  /  Alb  3.9  /  TBili  <0.2  /  DBili  <0.1  /  AST  15  /  ALT  9[L]  /  AlkPhos  123[H]  05-14    PT/INR - ( 14 May 2025 20:28 )   PT: 12.4 sec;   INR: 1.06     MICROBIOLOGY:    RADIOLOGY & ADDITIONAL STUDIES:

## 2025-05-15 NOTE — H&P ADULT - PROBLEM SELECTOR PLAN 1
Dx w/ NSCLC about 10months ago w/ worsening mets to brain. MRI brain on 5/13/25 showing worsening progression w/ vasogenic edema and midline shift. Daughter endorses patient has rapidly declining mental status since February.  - Follows w/ Dr. Jim and per most recent chart note, patient is not a candidate for any additl therapies.    Plan:  - Rad/Onc consult for palliative radiation  - Heme/Onc consult  - Palliative consult  - NSGY consulted, apprec additl recs  - c/w Decadron 4mg q6  - q8 neurochecks Dx w/ NSCLC about 10months ago w/ worsening mets to brain. MRI brain on 5/13/25 showing worsening progression w/ vasogenic edema and midline shift. Daughter endorses patient has rapidly declining mental status since February.  - Follows w/ Dr. Jim and per most recent chart note, patient is not a candidate for any additl therapies.    Plan:  - Rad/Onc consult for palliative radiation  - Heme/Onc consult  - Palliative consult  - NSGY consulted, apprec additl recs  - c/w Decadron 4mg q6  - q4 neurochecks  - NPO

## 2025-05-15 NOTE — H&P ADULT - ASSESSMENT
PMHx of NSCLC w/ mets to brain, DM, CKD, and recent hip Fx s/p repair w/ cc/b seizure presents to [facility] for worsening mental status w/ new MRI brain findings of worsening tumor progression. Admitted for further JEROME and management.  74F with PMHx of NSCLC w/ mets to brain, DM, CKD, and recent hip Fx s/p repair w/ cc/b seizure presents to [facility] for worsening mental status w/ new MRI brain findings of worsening tumor progression. Admitted for further JEROME and management.

## 2025-05-15 NOTE — H&P ADULT - HISTORY OF PRESENT ILLNESS
HPI: 75 y/o F w/ PMHx of NSCLC w/ mets to brain, DM, CKD, and recent hip Fx s/p repair w/ cc/b seizure. Recommended to come to the ED by oncologist Dr. Jim after recent findings on CTAP (showing steracolitis) and MRI brain (showing right-sided brain metastasis with vasogenic edema and mild midline shift, systemic progression) w/ goal for palliative radiation vs hospice initiation. Spoke to daughter and she endorsed that since February patient's mental status has rapidly declined. Daughter states that previously around January she was able to sign her own consent but now she has difficulty following commands and more weak. ROS is otherwise negative.     In the ED:  Initial vital signs: 184/93, 60hr, 18rr, 98.2F, 98%  Labs: Cr 2.45    MR brain on 05/13/25: Interval significant increase in size of lesions in the brain with significant edema. Small amount of right to left subfalcine herniation associated with the largest right frontal lesion    Medications: Decadron 10mg   Consults: NSGY

## 2025-05-15 NOTE — CONSULT NOTE ADULT - SUBJECTIVE AND OBJECTIVE BOX
HPI:  HPI: 73 y/o F w/ PMHx of NSCLC w/ mets to brain, DM, CKD, and recent hip Fx s/p repair w/ cc/b seizure. Recommended to come to the ED by oncologist Dr. Jim after recent findings on CTAP (showing steracolitis) and MRI brain (showing right-sided brain metastasis with vasogenic edema and mild midline shift, systemic progression) w/ goal for palliative radiation vs hospice initiation. Spoke to daughter and she endorsed that since February patient's mental status has rapidly declined. Daughter states that previously around January she was able to sign her own consent but now she has difficulty following commands and more weak. ROS is otherwise negative.     In the ED:  Initial vital signs: 184/93, 60hr, 18rr, 98.2F, 98%  Labs: Cr 2.45    MR brain on 05/13/25: Interval significant increase in size of lesions in the brain with significant edema. Small amount of right to left subfalcine herniation associated with the largest right frontal lesion    Medications: Decadron 10mg   Consults: NSGY   (15 May 2025 00:34)    PERTINENT PM/SXH:   HTN (hypertension)    HLD (hyperlipidemia)    DM (diabetes mellitus), type 2    Rheumatoid arthritis    Stage 4 chronic kidney disease    Degenerative joint disease (DJD) of lumbar spine    Osteopenia    Lung cancer metastatic to brain    Stage 3 chronic kidney disease    S/P total right hip arthroplasty      FAMILY HISTORY:  No family history of cancer (Father, Mother)  No history of  in first degree relatives according to chart  Unable to obtain due to patient's encephalopathy    ITEMS NOT CHECKED ARE NOT PRESENT  SOCIAL HISTORY:   Significant other/partner:  []  Children:  [X]   Substance hx:  []   Tobacco hx:  []   Alcohol hx: []   Home Opioid hx:  [] I-Stop Reference No:  - no active Rx's / see chart note  Living Situation: []Home  []Long term care  [X]Rehab []Other  Baptist/Spiritual practice: ; Role of organized Gnosticism [ ] important [ ] some [X] unable to assess  Coping: [] well [] with difficulty [] poor coping [X] unable to assess  Support system: [] strong [] adequate [] inadequate    ADVANCE DIRECTIVES:    []MOLST  []Living Will  DECISION MAKER(s):  [] Health Care Proxy(s)  [X] Surrogate(s)  [] Guardian           Name(s)/Phone Number(s): Chloe Minor    BASELINE (I)ADLs (prior to admission):  Sabine: []Total  [] Moderate []Dependent    ALLERGIES:  Bactrim (Rash)  citrus (Urticaria)  strawberry (Pruritus)    MEDICATIONS  (STANDING):  bisacodyl Suppository 10 milliGRAM(s) Rectal daily  dexAMETHasone  Injectable 2 milliGRAM(s) IV Push every 12 hours  dextrose 5%. 1000 milliLiter(s) (50 mL/Hr) IV Continuous <Continuous>  dextrose 5%. 1000 milliLiter(s) (100 mL/Hr) IV Continuous <Continuous>  dextrose 50% Injectable 25 Gram(s) IV Push once  dextrose 50% Injectable 12.5 Gram(s) IV Push once  dextrose 50% Injectable 25 Gram(s) IV Push once  glucagon  Injectable 1 milliGRAM(s) IntraMuscular once  heparin   Injectable 5000 Unit(s) SubCutaneous every 8 hours  insulin glargine Injectable (LANTUS) 10 Unit(s) SubCutaneous at bedtime  insulin lispro (ADMELOG) corrective regimen sliding scale   SubCutaneous three times a day before meals  insulin lispro (ADMELOG) corrective regimen sliding scale   SubCutaneous at bedtime  levETIRAcetam  IVPB 500 milliGRAM(s) IV Intermittent every 12 hours  NIFEdipine XL 90 milliGRAM(s) Oral every 24 hours  pantoprazole  Injectable 40 milliGRAM(s) IV Push every 24 hours  polyethylene glycol 3350 17 Gram(s) Oral every 24 hours  senna 2 Tablet(s) Oral every 12 hours    MEDICATIONS  (PRN):  dextrose Oral Gel 15 Gram(s) Oral once PRN Blood Glucose LESS THAN 70 milliGRAM(s)/deciliter    Analgesic Use (Scheduled and PRNs) for past 24 hours:    levETIRAcetam  IVPB   400 mL/Hr IV Intermittent (05-15-25 @ 02:49)      PRESENT SYMPTOMS/REVIEW OF SYSTEMS: []Unable to obtain due to poor mentation/encephalopathy  Source if other than patient:  []Family   []Team     Pain: [] yes [X] no  QOL Impact -   Location -                    Aggravating Factors -  Quality -  Radiation -  Timing -  Severity (0-10 scale) -   Minimal Acceptable Level (0-10 scale) -    CPOT Score:  (Pain Assessment Scale for Critically Ill)    PAIN AD Score:  (Nonverbal Pain Assessment Scale)    Dyspnea:                           []Mild  []Moderate []Severe  Anxiety:                             []Mild []Moderate []Severe  Fatigue:                             []Mild []Moderate []Severe  Nausea:                             []Mild []Moderate []Severe  Loss of Appetite:              []Mild []Moderate []Severe  Constipation:                    []Mild []Moderate []Severe    Other Symptoms:  [x]All Other Review Of Systems Negative     Palliative Performance Status Version 2:  30%  (Functional Assessment Tool)    PHYSICAL EXAM:  GENERAL:  [X]Alert  []Oriented x   []Lethargic  []Cachexia  []Unarousable  [X]Minimally Verbal  []Non-Verbal  Behavioral:   []Anxiety  []Delirium []Agitation [X]Calm  HEENT:  [X]Normal   []Dry mouth   []ET Tube/Trach  []Oral lesions  PULMONARY:   [X]Clear []Tachypnea  []Audible excessive secretions  []Normal Work of Breathing []Labored Breathing  []Rhonchi []Crackles []Wheezing  CARDIOVASCULAR:    [X]Regular Rate & Rhythm []Irregular []Tachy  []Festus  GASTROINTESTINAL:  [X]Soft  []Distended   []+BS  [X]Non tender []Tender  []PEG []OGT/ NGT  Last BM:  GENITOURINARY:  []Normal [X] Incontinent   []Oliguria/Anuria   []Salazar  MUSCULOSKELETAL:   []Normal   []Weakness  [X]Bed/Wheelchair bound []Edema  NEUROLOGIC:   []No focal deficits  [X]Cognitive impairment  []Dysphagia []Dysarthria []Paresis []Encephalopathic  SKIN:   [X]Normal   []Pressure ulcer(s)  []Rash    CRITICAL CARE:  []Shock Present  []Septic []Cardiogenic []Neurologic []Hypovolemic  []Vasopressors []Inotropes   []Respiratory failure present []Mechanical Ventilation []Non-invasive ventilatory support []High-Flow  []Acute  []Chronic []Hypoxic  []Hypercarbic  []Other organ failure    Vital Signs Last 24 Hrs  T(C): 36.4 (15 May 2025 11:59), Max: 36.8 (14 May 2025 19:59)  T(F): 97.6 (15 May 2025 11:59), Max: 98.2 (14 May 2025 19:59)  HR: 97 (15 May 2025 11:59) (58 - 108)  BP: 162/83 (15 May 2025 11:59) (146/77 - 233/88)  BP(mean): 119 (15 May 2025 08:42) (119 - 133)  RR: 18 (15 May 2025 11:59) (16 - 97)  SpO2: 98% (15 May 2025 11:59) (97% - 99%)    Parameters below as of 15 May 2025 11:59  Patient On (Oxygen Delivery Method): room air        LABS:                        10.7   6.32  )-----------( 178      ( 15 May 2025 05:30 )             34.4   05-15    141  |  107  |  53[H]  ----------------------------<  250[H]  3.8   |  19[L]  |  1.93[H]    Ca    9.6      15 May 2025 05:30  Phos  3.3     05-15  Mg     1.5     05-15    TPro  7.9  /  Alb  3.9  /  TBili  <0.2  /  DBili  <0.1  /  AST  15  /  ALT  9[L]  /  AlkPhos  123[H]  05-14    RADIOLOGY & ADDITIONAL STUDIES:  < from: MR Head No Cont (05.13.25 @ 16:14) >  IMPRESSION:    Interval significant increase in size of lesions in the brain with   significant edema. Small amount of right to left subfalcine herniation   associated with the largest right frontal lesion. Recommend further   evaluation with postcontrast sequences.    < end of copied text >      REFERRALS:  [x]Social Work  [X]Case management []PT/OT []Chaplaincy  []Hospice  []Patient/Family Support []Massage Therapy []Music Therapy    DISCUSSION OF CASE: Family - obtained additional history and to provide emotional support;  Primary team - discussed plan of care

## 2025-05-15 NOTE — H&P ADULT - PROBLEM SELECTOR PLAN 6
F: None  E: replete K < 4, Mg <2  DVT ppx- Heparin  Diet- regular  Activity- Bedrest   Code- Full. F: None  E: replete K < 4, Mg <2  DVT ppx- Heparin  Diet- NPO  Activity- Bedrest   Code- Full. Hx of CKD. Cr in 02/2025 ~1.5. Now ~2.4. Could be iso poor PO intake.    Plan:  - Avoid nephrotoxic agents  - f/u Urine studies  - Start Maintenance IVF 100cc/hr Hx of CKD. Cr in 02/2025 ~1.5. Now ~2.4. Could be iso poor PO intake.    Plan:  - Avoid nephrotoxic agents  - f/u Urine studies  - Monitor off IVF given HTN,  - Repeat Cr in AM

## 2025-05-15 NOTE — CONSULT NOTE ADULT - ASSESSMENT
74F with PMHx of NSCLC w/ mets to brain, DM, CKD, and recent hip Fx s/p repair w/ cc/b seizure presents to [facility] for worsening mental status w/ new MRI brain findings of worsening tumor progression. Palliative consulted for help with GOC.

## 2025-05-15 NOTE — H&P ADULT - ATTENDING COMMENTS
74F with PMH of NSCLC with brain mets (not amenable to further systemic therapy), DM2, CKD and recent hip fracture presenting nursing home due to progression of brain lesions, with midline shift.  Admitted for palliative care evaluation, possible hospice placement and ?assessment for radiation.  The path that the team will proceed with depends heavily on goals of care.  At this time, patient is AAO to self only, and does not follow commands.  There is no acute neurosurgical intervention per neurosurgery, but was started on steroids.      Agree with rest of documentation above  continued goals of care discussion and appreciate input from palliative care    NAD, awake, can say her name  mmm  rrr, normal s1s2  ctab    75 minutes spent on this encounter including face to face with patient, review of chart, care coordination and documentation.   Time spent on the encounter excludes teaching and separately reported services

## 2025-05-15 NOTE — CONSULT NOTE ADULT - PROBLEM SELECTOR RECOMMENDATION 4
See [facility] note above.  - DNR/DNI  - Surrogate: Chloe Minor (daughter)  - Hospice referral placed

## 2025-05-15 NOTE — CONSULT NOTE ADULT - CONVERSATION DETAILS
Discussed that treatment is no longer an option given the progression of her cancer and her poor performance status. Daughter is tearful about her no longer being a candidate for treatment, but understanding. Discussed that treatment for her cancer is no longer indicated given her clinical decline recently, the progression of her cancer and her poor performance status. Daughter is tearful about her no longer being a candidate for treatment, feeling "like we're giving up" but also understanding why treatment is no longer indicated. She stated that "I just want my mother to be comfortable" and so I reframed our discussion of foregoing further treatment and explained that though we are no longer fighting the cancer, we are now fighting to give her the best quality of life and quality time with family that we can. We discussed that that though her mother had recently, in February, stated that she wanted CPR and intubation, given these recent changes to her clinical status, it did not seem appropriate any more and would not be in line with a comfort-based approach to her care. Chloe was agreeable to a DNR and DNI. We also discussed hospice and she felt that hospice was the next best step for her mom.

## 2025-05-16 LAB
ANION GAP SERPL CALC-SCNC: 18 MMOL/L — HIGH (ref 5–17)
BUN SERPL-MCNC: 56 MG/DL — HIGH (ref 7–23)
CALCIUM SERPL-MCNC: 9.8 MG/DL — SIGNIFICANT CHANGE UP (ref 8.4–10.5)
CHLORIDE SERPL-SCNC: 105 MMOL/L — SIGNIFICANT CHANGE UP (ref 96–108)
CO2 SERPL-SCNC: 17 MMOL/L — LOW (ref 22–31)
CREAT SERPL-MCNC: 1.91 MG/DL — HIGH (ref 0.5–1.3)
EGFR: 27 ML/MIN/1.73M2 — LOW
EGFR: 27 ML/MIN/1.73M2 — LOW
GLUCOSE SERPL-MCNC: 186 MG/DL — HIGH (ref 70–99)
HCT VFR BLD CALC: 34.3 % — LOW (ref 34.5–45)
HGB BLD-MCNC: 10.9 G/DL — LOW (ref 11.5–15.5)
MAGNESIUM SERPL-MCNC: 2 MG/DL — SIGNIFICANT CHANGE UP (ref 1.6–2.6)
MCHC RBC-ENTMCNC: 27.2 PG — SIGNIFICANT CHANGE UP (ref 27–34)
MCHC RBC-ENTMCNC: 31.8 G/DL — LOW (ref 32–36)
MCV RBC AUTO: 85.5 FL — SIGNIFICANT CHANGE UP (ref 80–100)
NRBC BLD AUTO-RTO: 0 /100 WBCS — SIGNIFICANT CHANGE UP (ref 0–0)
PLATELET # BLD AUTO: 190 K/UL — SIGNIFICANT CHANGE UP (ref 150–400)
POTASSIUM SERPL-MCNC: 4.5 MMOL/L — SIGNIFICANT CHANGE UP (ref 3.5–5.3)
POTASSIUM SERPL-SCNC: 4.5 MMOL/L — SIGNIFICANT CHANGE UP (ref 3.5–5.3)
RBC # BLD: 4.01 M/UL — SIGNIFICANT CHANGE UP (ref 3.8–5.2)
RBC # FLD: 16.1 % — HIGH (ref 10.3–14.5)
SODIUM SERPL-SCNC: 140 MMOL/L — SIGNIFICANT CHANGE UP (ref 135–145)
WBC # BLD: 14.15 K/UL — HIGH (ref 3.8–10.5)
WBC # FLD AUTO: 14.15 K/UL — HIGH (ref 3.8–10.5)

## 2025-05-16 PROCEDURE — 99232 SBSQ HOSP IP/OBS MODERATE 35: CPT

## 2025-05-16 PROCEDURE — 99233 SBSQ HOSP IP/OBS HIGH 50: CPT

## 2025-05-16 RX ADMIN — LEVETIRACETAM 400 MILLIGRAM(S): 10 INJECTION, SOLUTION INTRAVENOUS at 18:21

## 2025-05-16 RX ADMIN — DEXAMETHASONE 2 MILLIGRAM(S): 0.5 TABLET ORAL at 13:52

## 2025-05-16 RX ADMIN — INSULIN LISPRO 2: 100 INJECTION, SOLUTION INTRAVENOUS; SUBCUTANEOUS at 09:00

## 2025-05-16 RX ADMIN — DEXAMETHASONE 2 MILLIGRAM(S): 0.5 TABLET ORAL at 01:04

## 2025-05-16 RX ADMIN — INSULIN LISPRO 6: 100 INJECTION, SOLUTION INTRAVENOUS; SUBCUTANEOUS at 12:48

## 2025-05-16 RX ADMIN — POLYETHYLENE GLYCOL 3350 17 GRAM(S): 17 POWDER, FOR SOLUTION ORAL at 03:01

## 2025-05-16 RX ADMIN — HEPARIN SODIUM 5000 UNIT(S): 1000 INJECTION INTRAVENOUS; SUBCUTANEOUS at 13:52

## 2025-05-16 RX ADMIN — INSULIN GLARGINE-YFGN 10 UNIT(S): 100 INJECTION, SOLUTION SUBCUTANEOUS at 23:04

## 2025-05-16 RX ADMIN — LEVETIRACETAM 400 MILLIGRAM(S): 10 INJECTION, SOLUTION INTRAVENOUS at 05:00

## 2025-05-16 RX ADMIN — Medication 2 TABLET(S): at 18:24

## 2025-05-16 RX ADMIN — HEPARIN SODIUM 5000 UNIT(S): 1000 INJECTION INTRAVENOUS; SUBCUTANEOUS at 23:04

## 2025-05-16 RX ADMIN — Medication 90 MILLIGRAM(S): at 23:04

## 2025-05-16 NOTE — PROGRESS NOTE ADULT - PROBLEM SELECTOR PLAN 7
F: None  E: replete K < 4, Mg <2  DVT ppx- Heparin  Diet- NPO  Activity- Bedrest   Code- Full. F: None  E: replete K < 4, Mg <2  DVT ppx- Heparin q8h  Diet- Pureed   Activity- Bedrest   Code- Full.

## 2025-05-16 NOTE — PROGRESS NOTE ADULT - ASSESSMENT
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  No plans for palliative radiation.  Comfort measures only, pending [facility] bed.    PPS 20%.  ECOG 4.  Prognosis is poor.  Patient is at risk of death while in the hospital.  Goals of care are comfort focused.  Pending [facility] bed.  DNR/DNI.

## 2025-05-16 NOTE — PROGRESS NOTE ADULT - SUBJECTIVE AND OBJECTIVE BOX
[facility]  eJremy Frye, Geriatrics & Palliative Medicine attending  Contact Info: Call [phone] (HEAL Line) or message on [employer] Teams    SUBJECTIVE AND OBJECTIVE:  The patient was seen in her room.  No family was at bedside.  Patient unable to provide any history.  Collateral information obtained from floor team.  As per the report, the patient has been comfortable.    INTERVAL HPI/OVERNIGHT EVENTS: Interval events noted. See patient's PRN use for the past 24hrs noted below.   Case discussed with palliative care .  Patient is accepted to [facility] pending bed.    ALLERGIES:  Bactrim (Rash)  citrus (Urticaria)  strawberry (Pruritus)    MEDICATIONS  (STANDING):  bisacodyl Suppository 10 milliGRAM(s) Rectal daily  dexAMETHasone  Injectable 2 milliGRAM(s) IV Push every 12 hours  dextrose 5%. 1000 milliLiter(s) (50 mL/Hr) IV Continuous <Continuous>  dextrose 5%. 1000 milliLiter(s) (100 mL/Hr) IV Continuous <Continuous>  dextrose 50% Injectable 25 Gram(s) IV Push once  dextrose 50% Injectable 12.5 Gram(s) IV Push once  dextrose 50% Injectable 25 Gram(s) IV Push once  glucagon  Injectable 1 milliGRAM(s) IntraMuscular once  heparin   Injectable 5000 Unit(s) SubCutaneous every 8 hours  insulin glargine Injectable (LANTUS) 10 Unit(s) SubCutaneous at bedtime  insulin lispro (ADMELOG) corrective regimen sliding scale   SubCutaneous three times a day before meals  insulin lispro (ADMELOG) corrective regimen sliding scale   SubCutaneous at bedtime  levETIRAcetam  IVPB 500 milliGRAM(s) IV Intermittent every 12 hours  NIFEdipine XL 90 milliGRAM(s) Oral every 24 hours  pantoprazole  Injectable 40 milliGRAM(s) IV Push every 24 hours  polyethylene glycol 3350 17 Gram(s) Oral every 24 hours  senna 2 Tablet(s) Oral every 12 hours    MEDICATIONS  (PRN):  dextrose Oral Gel 15 Gram(s) Oral once PRN Blood Glucose LESS THAN 70 milliGRAM(s)/deciliter      Analgesic Use (Scheduled and PRNs) for past 24 hours:    levETIRAcetam  IVPB   400 mL/Hr IV Intermittent (05-16-25 @ 05:00)   400 mL/Hr IV Intermittent (05-15-25 @ 17:07)      ITEMS UNCHECKED ARE NOT PRESENT  PRESENT SYMPTOMS/REVIEW OF SYSTEMS: []Unable to obtain due to poor mentation   Source if other than patient:  []Family   []Team     PHYSICAL EXAM  Older woman, lying on hospital bed, in no acute distress.  She is alert and awake, oriented x 0.  Not able to follow commands or interact meaningfully with examiner.  No increased work of breathing noted.  Diffuse weakness noted.  Abdomen is soft and nontender to touch.  No nonverbal signs of pain or discomfort noted.    Vital Signs Last 24 Hrs  T(C): 36.7 (16 May 2025 11:23), Max: 36.9 (15 May 2025 21:16)  T(F): 98 (16 May 2025 11:23), Max: 98.5 (15 May 2025 21:16)  HR: 66 (16 May 2025 11:23) (66 - 87)  BP: 157/74 (16 May 2025 11:23) (115/66 - 157/74)  BP(mean): 82 (16 May 2025 06:00) (82 - 82)  RR: 16 (16 May 2025 11:23) (16 - 18)  SpO2: 95% (16 May 2025 11:23) (95% - 100%)    Parameters below as of 16 May 2025 11:23  Patient On (Oxygen Delivery Method): room air        LABS: Personally reviewed and interpreted                        10.9   14.15 )-----------( 190      ( 16 May 2025 07:00 )             34.3   05-16    140  |  105  |  56[H]  ----------------------------<  186[H]  4.5   |  17[L]  |  1.91[H]    Ca    9.8      16 May 2025 07:00  Phos  3.3     05-15  Mg     2.0     05-16    TPro  7.9  /  Alb  3.9  /  TBili  <0.2  /  DBili  <0.1  /  AST  15  /  ALT  9[L]  /  AlkPhos  123[H]  05-14      RADIOLOGY & ADDITIONAL STUDIES: Personally reviewed and interpreted  None new    DISCUSSION OF CASE: Family - to provide updates and emotional support; Primary Team/RN - to discuss plan of care

## 2025-05-16 NOTE — PROGRESS NOTE ADULT - ASSESSMENT
74F with PMHx of NSCLC w/ mets to brain, DM, CKD, and recent hip Fx s/p repair w/ cc/b seizure presents to [facility] for worsening mental status w/ new MRI brain findings of worsening tumor progression. Admitted for further JEROME and management.  74F with PMHx of NSCLC w/ mets to brain, DM, CKD, and recent hip Fx s/p repair w/ cc/b seizure presents to [facility] for worsening mental status w/ new MRI brain findings of worsening tumor progression. Admitted for further JEROME and management.

## 2025-05-16 NOTE — PROGRESS NOTE ADULT - PROBLEM SELECTOR PLAN 2
.  Progression of brain mets and worsening edema with some mild herniation seen on MRI.  - Dexamethasone per neurosurgery; Would favor discontinuation of steroids due to persistent hyperglycemia and need for frequent insulin injections.  Patient is comfort focused care now.  - On keppra 500mg IV BID for seizure ppx. Would continue with Keppra for now.  Patient is high risk for seizures.

## 2025-05-16 NOTE — PROGRESS NOTE ADULT - PROBLEM SELECTOR PLAN 3
.  – Can continue with long-acting insulin for now.  – Would allow for more relaxed blood sugar control (180-250)

## 2025-05-16 NOTE — CHART NOTE - NSCHARTNOTEFT_GEN_A_CORE
Pt made DNR/DNI with hospice referrals sent. Neurosurgery signing off at this time. Please call with any further questions or concerns.

## 2025-05-16 NOTE — PROGRESS NOTE ADULT - PROBLEM SELECTOR PLAN 2
Patient w/ elevated BP of 184/93 followed by repeat SBP in 200s.   Unclear of home medication, possibly amlodipine per daughter    Plan:  - Formal med rec in AM w/ [facility]  - Start nifedipine 90 mg qd

## 2025-05-16 NOTE — PROGRESS NOTE ADULT - PROBLEM SELECTOR PLAN 1
Dx w/ NSCLC about 10months ago w/ worsening mets to brain. MRI brain on 5/13/25 showing worsening progression w/ vasogenic edema and midline shift. Daughter endorses patient has rapidly declining mental status since February.  - Follows w/ Dr. Jim and per most recent chart note, patient is not a candidate for any additl therapies.    Plan:  - Rad/Onc consult for palliative radiation  - Heme/Onc consult  - Palliative consult  - NSGY consulted, apprec additl recs  - c/w Decadron 4mg q6  - q4 neurochecks  - NPO Dx w/ NSCLC about 10months ago w/ worsening mets to brain. MRI brain on 5/13/25 showing worsening progression w/ vasogenic edema and midline shift. Daughter endorses patient has rapidly declining mental status since February.  - Follows w/ Dr. Jim and per most recent chart note, patient is not a candidate for any additl therapies.  Inc WBC this AM count likely 2/2 dexamethasone    Plan:  - Rad/Onc consult for palliative radiation  - Heme/Onc consult  - Palliative consult  - NSGY consulted, apprec additl recs  - c/w Decadron 4mg q6  - q4 neurochecks  - NPO

## 2025-05-16 NOTE — PROGRESS NOTE ADULT - SUBJECTIVE AND OBJECTIVE BOX
INTERVAL HPI/OVERNIGHT EVENTS: marv     SUBJECTIVE: Patient seen and examined at bedside, resting comfortably in bed, and does not appear to be in any acute distress. Patient unable to communicate needs.     Vital Signs Last 24 Hrs  T(C): 36.3 (16 May 2025 06:00), Max: 36.9 (15 May 2025 21:16)  T(F): 97.3 (16 May 2025 06:00), Max: 98.5 (15 May 2025 21:16)  HR: 67 (16 May 2025 06:00) (67 - 97)  BP: 115/66 (16 May 2025 06:00) (115/66 - 184/87)  BP(mean): 82 (16 May 2025 06:00) (82 - 119)  RR: 17 (16 May 2025 06:00) (17 - 18)  SpO2: 100% (16 May 2025 06:00) (98% - 100%)    Parameters below as of 16 May 2025 06:00  Patient On (Oxygen Delivery Method): room air      PHYSICAL EXAM:  General: in no acute distress  Eyes: EOMI intact bilaterally. Anicteric sclerae, moist conjunctivae  HENT: Moist mucous membranes  Neck: Trachea midline, supple  Lungs: CTA B/L. No wheezes, rales, or rhonchi  Cardiovascular: RRR. No murmurs, rubs, or gallops  Abdomen: Soft, non-tender non-distended; No rebound or guarding  Extremities: WWP, No clubbing, cyanosis.   Skin: Warm and dry. No obvious rash   Neuro: AAOx0. not following commands.     LABS:                        10.9   14.15 )-----------( 190      ( 16 May 2025 07:00 )             34.3     05-16    140  |  105  |  56[H]  ----------------------------<  186[H]  4.5   |  17[L]  |  1.91[H]    Ca    9.8      16 May 2025 07:00  Phos  3.3     05-15  Mg     2.0     05-16    TPro  7.9  /  Alb  3.9  /  TBili  <0.2  /  DBili  <0.1  /  AST  15  /  ALT  9[L]  /  AlkPhos  123[H]  05-14

## 2025-05-16 NOTE — PROGRESS NOTE ADULT - PROBLEM SELECTOR PLAN 5
.   See GOC note on 5/15  - DNR/DNI  - Surrogate: Chloe Minor (daughter)  - Pending bed availability at [facility].  Patient is already accepted.  - GOC are comfort focused.    Patient is asymptomatic, exam today.  If over the weekend she develops symptoms, can start the following meds.  – Dilaudid 0.2 mg IV every 4 hours standing for pain or shortness of breath.  – Glycopyrrolate 0.4 mg every 4 hours as needed for secretions.  – Diazepam 5 mg IV as needed seizures  – Lorazepam 1 mg IV every 6 hours as needed for anxiety.

## 2025-05-16 NOTE — PROGRESS NOTE ADULT - PROBLEM SELECTOR PLAN 5
Hx of DM. Per daughter patient maybe on Lantus 10u and humalog 12u.    Plan:  - q6 FS  - mSSI.  - Currently NPO  - Monitor FS  - Formal med rec in AM w/ NH.

## 2025-05-17 PROCEDURE — 99233 SBSQ HOSP IP/OBS HIGH 50: CPT

## 2025-05-17 RX ADMIN — HEPARIN SODIUM 5000 UNIT(S): 1000 INJECTION INTRAVENOUS; SUBCUTANEOUS at 06:52

## 2025-05-17 RX ADMIN — Medication 2 TABLET(S): at 06:52

## 2025-05-17 RX ADMIN — Medication 40 MILLIGRAM(S): at 06:53

## 2025-05-17 RX ADMIN — Medication 90 MILLIGRAM(S): at 22:37

## 2025-05-17 RX ADMIN — LEVETIRACETAM 400 MILLIGRAM(S): 10 INJECTION, SOLUTION INTRAVENOUS at 17:42

## 2025-05-17 RX ADMIN — INSULIN LISPRO 6: 100 INJECTION, SOLUTION INTRAVENOUS; SUBCUTANEOUS at 17:41

## 2025-05-17 RX ADMIN — DEXAMETHASONE 2 MILLIGRAM(S): 0.5 TABLET ORAL at 02:36

## 2025-05-17 RX ADMIN — Medication 10 MILLIGRAM(S): at 14:05

## 2025-05-17 RX ADMIN — LEVETIRACETAM 400 MILLIGRAM(S): 10 INJECTION, SOLUTION INTRAVENOUS at 06:53

## 2025-05-17 RX ADMIN — DEXAMETHASONE 2 MILLIGRAM(S): 0.5 TABLET ORAL at 14:04

## 2025-05-17 RX ADMIN — Medication 2 TABLET(S): at 17:46

## 2025-05-17 RX ADMIN — INSULIN LISPRO 2: 100 INJECTION, SOLUTION INTRAVENOUS; SUBCUTANEOUS at 22:36

## 2025-05-17 RX ADMIN — INSULIN GLARGINE-YFGN 10 UNIT(S): 100 INJECTION, SOLUTION SUBCUTANEOUS at 22:37

## 2025-05-17 NOTE — PROGRESS NOTE ADULT - PROBLEM SELECTOR PLAN 7
F: None  E:   DVT ppx- Heparin q8h  Diet- Pureed   Activity- Bedrest   Code- DNR/DNI.  - Awaiting [facility]

## 2025-05-17 NOTE — PROGRESS NOTE ADULT - PROBLEM SELECTOR PLAN 1
Dx w/ NSCLC about 10months ago w/ worsening mets to brain. MRI brain on 5/13/25 showing worsening progression w/ vasogenic edema and midline shift. Daughter endorses patient has rapidly declining mental status since February.  - Follows w/ Dr. Jim and per most recent chart note, patient is not a candidate for any additl therapies.  Inc WBC this AM count likely 2/2 dexamethasone    Plan:  - Patient is not an optimal candidate for palliative RT.  - Heme/Onc consult appreciated  - Palliative consult appreciated  - NSGY consulted, apprec additl recs  - c/w Decadron 2mg IV Q12  - Decision made for [facility]

## 2025-05-17 NOTE — PROGRESS NOTE ADULT - SUBJECTIVE AND OBJECTIVE BOX
Patient is a 74y old  Female who presents with a chief complaint of AMS (16 May 2025 16:45)      SUBJECTIVE / OVERNIGHT EVENTS:  Awake, confused.  No acute events overnight.  Awaiting [facility] Placement    MEDICATIONS  (STANDING):  bisacodyl Suppository 10 milliGRAM(s) Rectal daily  dexAMETHasone  Injectable 2 milliGRAM(s) IV Push every 12 hours  dextrose 5%. 1000 milliLiter(s) (50 mL/Hr) IV Continuous <Continuous>  dextrose 5%. 1000 milliLiter(s) (100 mL/Hr) IV Continuous <Continuous>  dextrose 50% Injectable 25 Gram(s) IV Push once  dextrose 50% Injectable 12.5 Gram(s) IV Push once  dextrose 50% Injectable 25 Gram(s) IV Push once  glucagon  Injectable 1 milliGRAM(s) IntraMuscular once  heparin   Injectable 5000 Unit(s) SubCutaneous every 8 hours  insulin glargine Injectable (LANTUS) 10 Unit(s) SubCutaneous at bedtime  insulin lispro (ADMELOG) corrective regimen sliding scale   SubCutaneous three times a day before meals  insulin lispro (ADMELOG) corrective regimen sliding scale   SubCutaneous at bedtime  levETIRAcetam  IVPB 500 milliGRAM(s) IV Intermittent every 12 hours  NIFEdipine XL 90 milliGRAM(s) Oral every 24 hours  pantoprazole  Injectable 40 milliGRAM(s) IV Push every 24 hours  polyethylene glycol 3350 17 Gram(s) Oral every 24 hours  senna 2 Tablet(s) Oral every 12 hours    MEDICATIONS  (PRN):  dextrose Oral Gel 15 Gram(s) Oral once PRN Blood Glucose LESS THAN 70 milliGRAM(s)/deciliter      CAPILLARY BLOOD GLUCOSE      POCT Blood Glucose.: 188 mg/dL (17 May 2025 08:14)  POCT Blood Glucose.: 146 mg/dL (16 May 2025 21:58)  POCT Blood Glucose.: 90 mg/dL (16 May 2025 18:16)  POCT Blood Glucose.: 68 mg/dL (16 May 2025 17:04)  POCT Blood Glucose.: 258 mg/dL (16 May 2025 12:18)    I&O's Summary      PHYSICAL EXAM:  Vital Signs Last 24 Hrs  T(C): 36.3 (17 May 2025 05:29), Max: 36.3 (17 May 2025 05:29)  T(F): 97.4 (17 May 2025 05:29), Max: 97.4 (17 May 2025 05:29)  HR: 69 (17 May 2025 05:29) (69 - 74)  BP: 154/78 (17 May 2025 05:29) (154/78 - 173/76)  BP(mean): --  RR: 19 (17 May 2025 05:29) (17 - 19)  SpO2: 97% (17 May 2025 05:29) (97% - 97%)    Parameters below as of 17 May 2025 05:29  Patient On (Oxygen Delivery Method): room air      GENERAL: NAD, well-developed  HEAD:  Atraumatic, Normocephalic  EYES: EOMI, PERRLA, conjunctiva and sclera clear  NECK: Supple, No JVD  CHEST/LUNG: Clear to auscultation bilaterally; No wheeze  HEART: Regular rate and rhythm; No murmurs, rubs, or gallops  ABDOMEN: Soft, Nontender, Nondistended; Bowel sounds present  EXTREMITIES:  2+ Peripheral Pulses, No clubbing, cyanosis, or edema  PSYCH: Awake, no acute distress  NEUROLOGY: non-focal  SKIN: No rashes or lesions    LABS:                        10.9   14.15 )-----------( 190      ( 16 May 2025 07:00 )             34.3     05-16    140  |  105  |  56[H]  ----------------------------<  186[H]  4.5   |  17[L]  |  1.91[H]    Ca    9.8      16 May 2025 07:00  Mg     2.0     05-16            Urinalysis Basic - ( 16 May 2025 07:00 )    Color: x / Appearance: x / SG: x / pH: x  Gluc: 186 mg/dL / Ketone: x  / Bili: x / Urobili: x   Blood: x / Protein: x / Nitrite: x   Leuk Esterase: x / RBC: x / WBC x   Sq Epi: x / Non Sq Epi: x / Bacteria: x        RADIOLOGY & ADDITIONAL TESTS:    Imaging Personally Reviewed:    Consultant(s) Notes Reviewed:      Care Discussed with Consultants/Other Providers:

## 2025-05-17 NOTE — PROGRESS NOTE ADULT - PROBLEM SELECTOR PLAN 5
Hx of DM. Per daughter patient on Lantus 10u and humalog 12u.    Plan:  - Patient is not currently eating, refusing diet.  - FS are being checked Q8 hours and have been .  - Would recommend minimizing FS's as much as possible as patient has not been extremely high, and overall prognosis is guarded.   - If continues to not eat, will likely discontinue Lantus.

## 2025-05-17 NOTE — PROGRESS NOTE ADULT - ASSESSMENT
74F with PMHx of NSCLC w/ mets to brain, DM, CKD, and recent hip Fx s/p repair w/ cc/b seizure presents to [facility] for worsening mental status w/ new MRI brain findings of worsening tumor progression. Admitted for further JEROME and management.

## 2025-05-17 NOTE — PROGRESS NOTE ADULT - PROBLEM SELECTOR PLAN 3
Hx of seizure after hip surgery during last admission. Unclear of keppra dose. Per surescript 500mg q12.     Plan:  - Keppra 500mg IV q12.

## 2025-05-18 PROCEDURE — 99233 SBSQ HOSP IP/OBS HIGH 50: CPT

## 2025-05-18 RX ADMIN — INSULIN LISPRO 4: 100 INJECTION, SOLUTION INTRAVENOUS; SUBCUTANEOUS at 09:04

## 2025-05-18 RX ADMIN — Medication 2 TABLET(S): at 17:41

## 2025-05-18 RX ADMIN — LEVETIRACETAM 400 MILLIGRAM(S): 10 INJECTION, SOLUTION INTRAVENOUS at 06:28

## 2025-05-18 RX ADMIN — LEVETIRACETAM 400 MILLIGRAM(S): 10 INJECTION, SOLUTION INTRAVENOUS at 17:39

## 2025-05-18 RX ADMIN — DEXAMETHASONE 2 MILLIGRAM(S): 0.5 TABLET ORAL at 03:07

## 2025-05-18 RX ADMIN — POLYETHYLENE GLYCOL 3350 17 GRAM(S): 17 POWDER, FOR SOLUTION ORAL at 22:02

## 2025-05-18 RX ADMIN — INSULIN LISPRO 4: 100 INJECTION, SOLUTION INTRAVENOUS; SUBCUTANEOUS at 17:38

## 2025-05-18 RX ADMIN — Medication 10 MILLIGRAM(S): at 13:12

## 2025-05-18 RX ADMIN — Medication 40 MILLIGRAM(S): at 06:28

## 2025-05-18 RX ADMIN — DEXAMETHASONE 2 MILLIGRAM(S): 0.5 TABLET ORAL at 13:13

## 2025-05-18 RX ADMIN — Medication 90 MILLIGRAM(S): at 22:02

## 2025-05-18 RX ADMIN — Medication 2 TABLET(S): at 06:28

## 2025-05-18 RX ADMIN — INSULIN GLARGINE-YFGN 10 UNIT(S): 100 INJECTION, SOLUTION SUBCUTANEOUS at 22:02

## 2025-05-18 NOTE — PROGRESS NOTE ADULT - PROBLEM SELECTOR PLAN 1
Dx w/ NSCLC about 10months ago w/ worsening mets to brain. MRI brain on 5/13/25 showing worsening progression w/ vasogenic edema and midline shift. Daughter endorses patient has rapidly declining mental status since February.  - Follows w/ Dr. Jim and per most recent chart note, patient is not a candidate for any additl therapies.  Inc WBC this AM count likely 2/2 dexamethasone  - Patient is not an optimal candidate for palliative RT.  - Heme/Onc consult appreciated  - Palliative consult appreciated  - NSGY consulted, apprec additl recs  - c/w Decadron 2mg IV Q12  - Decision made for [facility] Dx w/ NSCLC about 10months ago w/ worsening mets to brain. MRI brain on 5/13/25 showing worsening progression w/ vasogenic edema and midline shift. Daughter endorses patient has rapidly declining mental status since February.  Follows w/ Dr. Jim and per most recent chart note, patient is not a candidate for any additl therapies.  - Patient is not an optimal candidate for palliative RT.  - c/w Decadron 2mg IV Q12  - Decision made for [facility]

## 2025-05-18 NOTE — PROGRESS NOTE ADULT - TIME BILLING
Time inclusive of chart review, medication ordering, discussion with primary team, clinical documentation, and communication with family/caregiver
Time inclusive of chart review, medication ordering, discussion with primary team, clinical documentation, and communication with family/caregiver

## 2025-05-18 NOTE — PROGRESS NOTE ADULT - PROBLEM SELECTOR PLAN 3
Hx of seizure after hip surgery during last admission. Unclear of keppra dose. Per surescript 500mg q12.     Plan:  - Keppra 500mg IV q12. Hx of seizure after hip surgery during last admission. Unclear of keppra dose. Per surescript 500mg q12.   Plan:  - Keppra 500mg IV q12.

## 2025-05-18 NOTE — PROGRESS NOTE ADULT - PROBLEM SELECTOR PLAN 5
Hx of DM. Per daughter patient on Lantus 10u and humalog 12u.    Plan:  - Patient is not currently eating, refusing diet.  - FS are being checked Q8 hours and have been .  - Would recommend minimizing FS's as much as possible as patient has not been extremely high, and overall prognosis is guarded.   - If continues to not eat, will likely discontinue Lantus. Hx of DM. Per daughter patient on Lantus 10u and humalog 12u.  - Patient is not currently eating, refusing diet.  - FS are being checked Q8 hours and have been .  - Would recommend minimizing FS's as much as possible as patient has not been extremely high, and overall prognosis is guarded.   - If continues to not eat, will likely discontinue Lantus.

## 2025-05-18 NOTE — PROGRESS NOTE ADULT - ASSESSMENT
74F with PMHx of NSCLC w/ mets to brain, DM, CKD, and recent hip Fx s/p repair w/ cc/b seizure presents to [facility] for worsening mental status w/ new MRI brain findings of worsening tumor progression. Admitted for further JEROME and management.  74F with PMHx of NSCLC w/ mets to brain, DM, CKD, and recent hip Fx s/p repair w/ cc/b seizure presents to [facility] for worsening mental status w/ new MRI brain findings of worsening tumor progression. Admitted for further JEROME and management, now comfort measures only,

## 2025-05-18 NOTE — PROGRESS NOTE ADULT - ATTENDING COMMENTS
74F with PMH of NSCLC with brain mets (not amenable to further systemic therapy), DM2, CKD and recent hip fracture presenting nursing home due to progression of brain lesions, with midline shift.  Admitted for palliative care evaluation, possible hospice placement and ?assessment for radiation.  The path that the team will proceed with depends heavily on goals of care.  At this time, patient is AAO to self only, and does not follow commands.  There is no acute neurosurgical intervention per neurosurgery, but was started on steroids.      Agree with rest of documentation above  Accepted to [facility], pending bed availability  will continue with IV decadron 2g q12 hours  no palliative radiation being offered by Dr. Wernicke  will proceed with comfort care, and follow up with palliative care    NAD, awake, can say her name  mmm  rrr, normal s1s2  ctab    50 minutes spent on this encounter including face to face with patient, review of chart, care coordination and documentation.   Time spent on the encounter excludes teaching and separately reported services
Patient seen and examined.  Agree with resident note as above.  Patient is a 74F with PMHx of NSCLC w/ mets to brain, DM, CKD, and recent hip Fx s/p repair w/ cc/b seizure presents to [facility] for worsening mental status w/ new MRI brain findings of worsening tumor progression. Admitted for further JEROME and management, now comfort measures only.  Awaiting placement at [facility].  Remains on finger sticks given that patient is on Decadron and is still on Lantus.  As she is no longer eating significantly, waiting for FS's to be below 250 to discontinue FS's.

## 2025-05-18 NOTE — PROGRESS NOTE ADULT - PROBLEM SELECTOR PLAN 7
F: None  E:   DVT ppx- Heparin q8h  Diet- Pureed   Activity- Bedrest   Code- DNR/DNI.  - Awaiting [facility]

## 2025-05-18 NOTE — PROGRESS NOTE ADULT - SUBJECTIVE AND OBJECTIVE BOX
OVERNIGHT EVENTS:    SUBJECTIVE / INTERVAL HPI: Patient seen and examined at bedside.     VITAL SIGNS:  Vital Signs Last 24 Hrs  T(C): 36.4 (18 May 2025 06:09), Max: 36.7 (17 May 2025 21:23)  T(F): 97.5 (18 May 2025 06:09), Max: 98 (17 May 2025 21:23)  HR: 72 (18 May 2025 06:09) (60 - 72)  BP: 151/83 (18 May 2025 06:09) (151/83 - 184/97)  BP(mean): --  RR: 17 (18 May 2025 06:09) (17 - 18)  SpO2: 93% (18 May 2025 06:09) (93% - 98%)    Parameters below as of 18 May 2025 06:09  Patient On (Oxygen Delivery Method): room air      I&O's Summary      PHYSICAL EXAM:  General: WDWN  HEENT: NC/AT; PERRL, anicteric sclera; MMM  Neck: supple  Cardiovascular: +S1/S2; RRR  Respiratory: CTA B/L; no W/R/R  Gastrointestinal: soft, NT/ND; +BSx4  Extremities: WWP; no edema, clubbing or cyanosis  Vascular: 2+ radial, DP/PT pulses B/L  Neurological: AAOx3; no focal deficits    MEDICATIONS:  MEDICATIONS  (STANDING):  bisacodyl Suppository 10 milliGRAM(s) Rectal daily  dexAMETHasone  Injectable 2 milliGRAM(s) IV Push every 12 hours  dextrose 5%. 1000 milliLiter(s) (50 mL/Hr) IV Continuous <Continuous>  dextrose 5%. 1000 milliLiter(s) (100 mL/Hr) IV Continuous <Continuous>  dextrose 50% Injectable 25 Gram(s) IV Push once  dextrose 50% Injectable 12.5 Gram(s) IV Push once  dextrose 50% Injectable 25 Gram(s) IV Push once  glucagon  Injectable 1 milliGRAM(s) IntraMuscular once  insulin glargine Injectable (LANTUS) 10 Unit(s) SubCutaneous at bedtime  insulin lispro (ADMELOG) corrective regimen sliding scale   SubCutaneous three times a day before meals  insulin lispro (ADMELOG) corrective regimen sliding scale   SubCutaneous at bedtime  levETIRAcetam  IVPB 500 milliGRAM(s) IV Intermittent every 12 hours  NIFEdipine XL 90 milliGRAM(s) Oral every 24 hours  pantoprazole  Injectable 40 milliGRAM(s) IV Push every 24 hours  polyethylene glycol 3350 17 Gram(s) Oral every 24 hours  senna 2 Tablet(s) Oral every 12 hours    MEDICATIONS  (PRN):  dextrose Oral Gel 15 Gram(s) Oral once PRN Blood Glucose LESS THAN 70 milliGRAM(s)/deciliter      ALLERGIES:  Allergies    Bactrim (Rash)  citrus (Urticaria)  strawberry (Pruritus)    Intolerances        LABS:              CAPILLARY BLOOD GLUCOSE      POCT Blood Glucose.: 279 mg/dL (17 May 2025 21:27)      RADIOLOGY & ADDITIONAL TESTS: Reviewed.   OVERNIGHT EVENTS: no overnight events    SUBJECTIVE / INTERVAL HPI: Patient seen and examined at bedside. Resting comfortably in bed, NAD.    VITAL SIGNS:  Vital Signs Last 24 Hrs  T(C): 36.4 (18 May 2025 06:09), Max: 36.7 (17 May 2025 21:23)  T(F): 97.5 (18 May 2025 06:09), Max: 98 (17 May 2025 21:23)  HR: 72 (18 May 2025 06:09) (60 - 72)  BP: 151/83 (18 May 2025 06:09) (151/83 - 184/97)  BP(mean): --  RR: 17 (18 May 2025 06:09) (17 - 18)  SpO2: 93% (18 May 2025 06:09) (93% - 98%)    Parameters below as of 18 May 2025 06:09  Patient On (Oxygen Delivery Method): room air      I&O's Summary      PHYSICAL EXAM:  General: in no acute distress  HENT: Moist mucous membranes  Lungs: CTA B/L. No wheezes, rales, or rhonchi  Cardiovascular: RRR. No murmurs, rubs, or gallops  Abdomen: Soft, non-tender non-distended  Extremities: WWP, No clubbing, cyanosis.   Skin: Warm and dry. No obvious rash   Neuro: AAOx0. not following commands      MEDICATIONS:  MEDICATIONS  (STANDING):  bisacodyl Suppository 10 milliGRAM(s) Rectal daily  dexAMETHasone  Injectable 2 milliGRAM(s) IV Push every 12 hours  dextrose 5%. 1000 milliLiter(s) (50 mL/Hr) IV Continuous <Continuous>  dextrose 5%. 1000 milliLiter(s) (100 mL/Hr) IV Continuous <Continuous>  dextrose 50% Injectable 25 Gram(s) IV Push once  dextrose 50% Injectable 12.5 Gram(s) IV Push once  dextrose 50% Injectable 25 Gram(s) IV Push once  glucagon  Injectable 1 milliGRAM(s) IntraMuscular once  insulin glargine Injectable (LANTUS) 10 Unit(s) SubCutaneous at bedtime  insulin lispro (ADMELOG) corrective regimen sliding scale   SubCutaneous three times a day before meals  insulin lispro (ADMELOG) corrective regimen sliding scale   SubCutaneous at bedtime  levETIRAcetam  IVPB 500 milliGRAM(s) IV Intermittent every 12 hours  NIFEdipine XL 90 milliGRAM(s) Oral every 24 hours  pantoprazole  Injectable 40 milliGRAM(s) IV Push every 24 hours  polyethylene glycol 3350 17 Gram(s) Oral every 24 hours  senna 2 Tablet(s) Oral every 12 hours    MEDICATIONS  (PRN):  dextrose Oral Gel 15 Gram(s) Oral once PRN Blood Glucose LESS THAN 70 milliGRAM(s)/deciliter      ALLERGIES:  Allergies    Bactrim (Rash)  citrus (Urticaria)  strawberry (Pruritus)    Intolerances            CAPILLARY BLOOD GLUCOSE  POCT Blood Glucose.: 279 mg/dL (17 May 2025 21:27)      RADIOLOGY & ADDITIONAL TESTS: Reviewed.

## 2025-05-19 ENCOUNTER — TRANSCRIPTION ENCOUNTER (OUTPATIENT)
Age: 75
End: 2025-05-19

## 2025-05-19 VITALS
OXYGEN SATURATION: 95 % | SYSTOLIC BLOOD PRESSURE: 194 MMHG | RESPIRATION RATE: 18 BRPM | HEART RATE: 80 BPM | TEMPERATURE: 99 F | DIASTOLIC BLOOD PRESSURE: 86 MMHG

## 2025-05-19 PROCEDURE — 99239 HOSP IP/OBS DSCHRG MGMT >30: CPT

## 2025-05-19 PROCEDURE — 99232 SBSQ HOSP IP/OBS MODERATE 35: CPT

## 2025-05-19 RX ADMIN — DEXAMETHASONE 2 MILLIGRAM(S): 0.5 TABLET ORAL at 03:20

## 2025-05-19 RX ADMIN — Medication 2 TABLET(S): at 05:58

## 2025-05-19 RX ADMIN — Medication 40 MILLIGRAM(S): at 05:57

## 2025-05-19 RX ADMIN — INSULIN LISPRO 6: 100 INJECTION, SOLUTION INTRAVENOUS; SUBCUTANEOUS at 12:19

## 2025-05-19 RX ADMIN — LEVETIRACETAM 400 MILLIGRAM(S): 10 INJECTION, SOLUTION INTRAVENOUS at 05:57

## 2025-05-19 NOTE — PROGRESS NOTE ADULT - PROBLEM SELECTOR PROBLEM 2
Encephalopathy acute
Encephalopathy acute
Hypertensive urgency

## 2025-05-19 NOTE — DISCHARGE NOTE PROVIDER - NSDCFUSCHEDAPPT_GEN_ALL_CORE_FT
Leonard Almanzar  [facility] Physician [facility]  ORTHOSURG [address]  Scheduled Appointment: 06/06/2025

## 2025-05-19 NOTE — PROGRESS NOTE ADULT - ASSESSMENT
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  No plans for palliative radiation.  Comfort measures only, pending [facility] bed.    PPS 20%.  ECOG 4.  Prognosis is poor.  Patient is at risk of death while in the hospital.  Goals of care are comfort focused.  Pending [facility] bed.  DNR/DNI.

## 2025-05-19 NOTE — PROGRESS NOTE ADULT - PROBLEM SELECTOR PLAN 6
Hx of CKD. Cr in 02/2025 ~1.5. Now ~2.4. Could be iso poor PO intake.    Plan:  - Avoid nephrotoxic agents
Hx of CKD. Cr in 02/2025 ~1.5. Now ~2.4. Could be iso poor PO intake.    Plan:  - Avoid nephrotoxic agents  - f/u Urine studies  - Monitor off IVF given HTN,  - Repeat Cr in AM
.  Following for GOC. Will continue to follow.
.  Following for GOC. Will continue to follow.
Hx of CKD. Cr in 02/2025 ~1.5. Now ~2.4. Could be iso poor PO intake.    Plan:  - Avoid nephrotoxic agents
Hx of CKD. Cr in 02/2025 ~1.5. Now ~2.4. Could be iso poor PO intake.    Plan:  - Avoid nephrotoxic agents

## 2025-05-19 NOTE — PROGRESS NOTE ADULT - PROBLEM SELECTOR PROBLEM 1
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
Debility
Debility

## 2025-05-19 NOTE — PROGRESS NOTE ADULT - PROBLEM SELECTOR PLAN 4
Outpatient CTAP showing sterocoral colitis.   - Started on senna and miralax.
.  -No longer a candidate for DMT.   -Not a candidate for radiation therapy.  - Hospice eligible. Daughter agreeable.
.  -No longer a candidate for DMT.   -Not a candidate for radiation therapy.  - Hospice eligible. Daughter agreeable.
Outpatient CTAP showing sterocoral colitis.   - Started on senna and miralax.

## 2025-05-19 NOTE — PROGRESS NOTE ADULT - SUBJECTIVE AND OBJECTIVE BOX
incomplete note    INTERVAL HPI/OVERNIGHT EVENTS: marv     SUBJECTIVE: Patient seen and examined at bedside, resting comfortably in bed, and does not appear to be in any acute distress.    Vital Signs Last 24 Hrs  T(C): 36.6 (19 May 2025 06:06), Max: 36.6 (19 May 2025 06:06)  T(F): 97.9 (19 May 2025 06:06), Max: 97.9 (19 May 2025 06:06)  HR: 81 (19 May 2025 06:06) (71 - 81)  BP: 193/83 (19 May 2025 06:06) (152/81 - 193/83)  BP(mean): --  RR: 18 (19 May 2025 06:06) (18 - 18)  SpO2: 94% (19 May 2025 06:06) (94% - 95%)    Parameters below as of 18 May 2025 21:58  Patient On (Oxygen Delivery Method): room air        PHYSICAL EXAM:  General: in no acute distress, awakens to voice   Eyes: EOMI intact bilaterally. Anicteric sclerae, moist conjunctivae  HENT: Moist mucous membranes  Neck: Trachea midline, supple  Lungs: CTA B/L. No wheezes, rales, or rhonchi  Cardiovascular: RRR. No murmurs, rubs, or gallops  Abdomen: Soft, non-tender non-distended; No rebound or guarding  Extremities: WWP, No clubbing, cyanosis or edema  Neurological: Alert and oriented x0.   Skin: Warm and dry. No obvious rash     LABS: [place]

## 2025-05-19 NOTE — DISCHARGE NOTE NURSING/CASE MANAGEMENT/SOCIAL WORK - FINANCIAL ASSISTANCE
[facility] provides services at a reduced cost to those who are determined to be eligible through [facility]’s financial assistance program. Information regarding [facility]’s financial assistance program can be found by going to [email or web address] or by calling [phone].

## 2025-05-19 NOTE — DISCHARGE NOTE PROVIDER - NSDCMRMEDTOKEN_GEN_ALL_CORE_FT
acetaminophen 325 mg oral tablet: 2 tab(s) orally every 6 hours As needed Temp greater or equal to 38C (100.4F), Mild Pain (1 - 3)  aspirin 81 mg oral tablet, chewable: 1 tab(s) orally 2 times a day  atorvastatin 40 mg oral tablet: 1 tab(s) orally once a day (at bedtime)  insulin glargine 100 units/mL subcutaneous solution: 10 unit(s) subcutaneous once a day (at bedtime) patient on 14u at home. Resumed on 10u post operatively. Continue 10u, titrate up as indicated  insulin lispro 100 units/mL injectable solution: 12 unit(s) subcutaneous 3 times a day (before meals) 12u TID with meals: home Regimen. Can continue as Insulin sliding scale at rehab  levETIRAcetam 500 mg oral tablet: 1 tab(s) orally 2 times a day continue this medication until your follow up appointment with Dr. Veloz  NIFEdipine 90 mg oral tablet, extended release: 1 tab(s) orally once a day  oxyCODONE: 2.5 milligram(s) orally every 4 hours  oxyCODONE 5 mg oral tablet: 1 tab(s) orally every 4 hours As needed Severe Pain (7 - 10)   acetaminophen 325 mg oral tablet: 2 tab(s) orally every 6 hours As needed Temp greater or equal to 38C (100.4F), Mild Pain (1 - 3)  levETIRAcetam 500 mg oral tablet: 1 tab(s) orally 2 times a day continue this medication until your follow up appointment with Dr. Veloz  NIFEdipine 90 mg oral tablet, extended release: 1 tab(s) orally once a day  oxyCODONE 5 mg oral tablet: 1 tab(s) orally every 4 hours As needed Severe Pain (7 - 10)

## 2025-05-19 NOTE — PROGRESS NOTE ADULT - PROBLEM SELECTOR PLAN 7
F: None  E:   DVT ppx- Heparin q8h  Diet- Pureed   Activity- Bedrest   Code- DNR/DNI.  - Awaiting [facility] F: None  E: replete PRN   DVT ppx- Heparin q8h  Diet- Pureed   Activity- Bedrest   Code- DNR/DNI.  - Awaiting [facility]

## 2025-05-19 NOTE — PROGRESS NOTE ADULT - PROBLEM SELECTOR PLAN 5
.   See GOC note on 5/15  - DNR/DNI  - Surrogate: Chloe Minor (daughter)  - Pending bed availability at [facility].  Patient is already accepted.  - GOC are comfort focused.    Patient is asymptomatic, exam today.  If she develops symptoms, can start the following meds.  – Dilaudid 0.2 mg IV every 4 hours standing for pain or shortness of breath.  – Glycopyrrolate 0.4 mg every 4 hours as needed for secretions.  – Diazepam 5 mg IV as needed seizures  – Lorazepam 1 mg IV every 6 hours as needed for anxiety.

## 2025-05-19 NOTE — DISCHARGE NOTE NURSING/CASE MANAGEMENT/SOCIAL WORK - PATIENT PORTAL LINK FT
You can access the FollowMyHealth Patient Portal offered by [facility] by registering at the following website: [email or web address]. By joining [facility]’s FollowMyHealth portal, you will also be able to view your health information using other applications (apps) compatible with our system.

## 2025-05-19 NOTE — PROGRESS NOTE ADULT - PROBLEM SELECTOR PLAN 5
Hx of DM. Per daughter patient on Lantus 10u and humalog 12u.  - Patient is not currently eating, refusing diet.  - FS are being checked Q8 hours and have been .  - Would recommend minimizing FS's as much as possible as patient has not been extremely high, and overall prognosis is guarded.   - If continues to not eat, will likely discontinue Lantus.

## 2025-05-19 NOTE — PROGRESS NOTE ADULT - NS ATTEST RISK PROBLEM GEN_ALL_CORE FT
Acute illness posing risk to life.  Chronic illness with progression of disease.  Need for independent historian.  Need to discuss care with primary team.  Need to discuss care with  team.  Prescription of opioid medications.  DNR/DNI.  Comfort focused measures only.
Acute illness posing risk to life.  Chronic illness with progression of disease.  Need for independent historian.  Need to discuss care with primary team.  Need to discuss care with  team.  Prescription of opioid medications.  DNR/DNI.  Comfort focused measures only.

## 2025-05-19 NOTE — PROGRESS NOTE ADULT - PROBLEM SELECTOR PLAN 1
Dx w/ NSCLC about 10months ago w/ worsening mets to brain. MRI brain on 5/13/25 showing worsening progression w/ vasogenic edema and midline shift. Daughter endorses patient has rapidly declining mental status since February.  Follows w/ Dr. Jim and per most recent chart note, patient is not a candidate for any additl therapies.  - Patient is not an optimal candidate for palliative RT.  - c/w Decadron 2mg IV Q12  - Decision made for [facility]

## 2025-05-19 NOTE — PROGRESS NOTE ADULT - PROBLEM SELECTOR PROBLEM 5
Advanced care planning/counseling discussion
Diabetes
Diabetes
Advanced care planning/counseling discussion
Diabetes
Diabetes

## 2025-05-19 NOTE — PROGRESS NOTE ADULT - SUBJECTIVE AND OBJECTIVE BOX
[facility] Geriatrics and Palliative Care  Jeremy Frye, Geriatrics & Palliative Medicine attending  Contact Info: Call [phone] (HEAL Line) or message on [employer] Teams    SUBJECTIVE AND OBJECTIVE:  Patient seen at bedside.  Patient unable to provide any history.  Collateral information obtained from bedside nurse.  As per report, the patient has been comfortable.    INTERVAL HPI/OVERNIGHT EVENTS: Interval events noted. See patient's PRN use for the past 24hrs noted below.   Plan for  discharge to [place] today at 4 PM.    ALLERGIES:  Bactrim (Rash)  citrus (Urticaria)  strawberry (Pruritus)    MEDICATIONS  (STANDING):  bisacodyl Suppository 10 milliGRAM(s) Rectal daily  dextrose 5%. 1000 milliLiter(s) (50 mL/Hr) IV Continuous <Continuous>  dextrose 5%. 1000 milliLiter(s) (100 mL/Hr) IV Continuous <Continuous>  dextrose 50% Injectable 25 Gram(s) IV Push once  dextrose 50% Injectable 12.5 Gram(s) IV Push once  dextrose 50% Injectable 25 Gram(s) IV Push once  glucagon  Injectable 1 milliGRAM(s) IntraMuscular once  insulin glargine Injectable (LANTUS) 10 Unit(s) SubCutaneous at bedtime  insulin lispro (ADMELOG) corrective regimen sliding scale   SubCutaneous three times a day before meals  insulin lispro (ADMELOG) corrective regimen sliding scale   SubCutaneous at bedtime  levETIRAcetam  IVPB 500 milliGRAM(s) IV Intermittent every 12 hours  NIFEdipine XL 90 milliGRAM(s) Oral every 24 hours  pantoprazole  Injectable 40 milliGRAM(s) IV Push every 24 hours  polyethylene glycol 3350 17 Gram(s) Oral every 24 hours  senna 2 Tablet(s) Oral every 12 hours    MEDICATIONS  (PRN):  dextrose Oral Gel 15 Gram(s) Oral once PRN Blood Glucose LESS THAN 70 milliGRAM(s)/deciliter      Analgesic Use (Scheduled and PRNs) for past 24 hours:    levETIRAcetam  IVPB   400 mL/Hr IV Intermittent (05-19-25 @ 05:57)      ITEMS UNCHECKED ARE NOT PRESENT  PRESENT SYMPTOMS/REVIEW OF SYSTEMS: []Unable to obtain due to poor mentation   Source if other than patient:  []Family   []Team     PHYSICAL EXAM  Older woman, lying in hospital bed in no acute distress.  She is alert and awake, but disoriented.  Not able to follow any commands.  No signs of pain or discomfort noted.  No increased work of breathing noted.  Abdomen soft and nontender.    Vital Signs Last 24 Hrs  T(C): 37.1 (19 May 2025 12:38), Max: 37.1 (19 May 2025 12:38)  T(F): 98.8 (19 May 2025 12:38), Max: 98.8 (19 May 2025 12:38)  HR: 80 (19 May 2025 12:38) (71 - 81)  BP: 194/86 (19 May 2025 12:38) (152/81 - 194/86)  BP(mean): --  RR: 18 (19 May 2025 12:38) (18 - 18)  SpO2: 95% (19 May 2025 12:38) (94% - 95%)    Parameters below as of 19 May 2025 12:38  Patient On (Oxygen Delivery Method): room air        LABS: Personally reviewed and interpreted          RADIOLOGY & ADDITIONAL STUDIES: Personally reviewed and interpreted  None new    DISCUSSION OF CASE: Family - to provide updates and emotional support; Primary Team/RN - to discuss plan of care

## 2025-05-19 NOTE — PROGRESS NOTE ADULT - PROBLEM SELECTOR PROBLEM 4
Stercoral colitis
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
Stercoral colitis
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

## 2025-05-19 NOTE — DISCHARGE NOTE PROVIDER - HOSPITAL COURSE
PMHx of NSCLC w/ mets to brain, DM, CKD, and recent hip Fx s/p repair w/ cc/b seizure presents to [facility] for worsening mental status w/ new MRI brain findings of worsening tumor progression. Pt continued on decadron for brain mets. Pt hypertensive on admission hypertensive controlled with Hydral. Radiation oncology consulted and state that patient is not candidate for radiation therapy. Pt made comfort care. Accepted to [facility] for placement.     Hospital course (by problem):     # NSCLC metastatic to brain.    Dx w/ NSCLC about 10months ago w/ worsening mets to brain. MRI brain on 5/13/25 showing worsening progression w/ vasogenic edema and midline shift. Daughter endorses patient has rapidly declining mental status since February.  Follows w/ Dr. Jim and per most recent chart note, patient is not a candidate for any additl therapies.    Plan  - Patient is not an optimal candidate for palliative RT.  - c/w Decadron 2mg IV Q12  - Decision made for [facility].    # Hypertensive urgency.   RESOLVED  Plan:  - no need for home meds, not within GOC.    #Seizure.   Hx of seizure after hip surgery during last admission. Unclear of keppra dose. Per surescript 500mg q12.   Plan:  - Keppra 500mg IV q12.    #Stercoral colitis.    Outpatient CTAP showing sterocoral colitis.   - Started on senna and miralax.    # Diabetes.   Hx of DM. Per daughter patient on Lantus 10u and humalog 12u.  - Patient is not currently eating, refusing diet.  - FS are being checked Q8 hours and have been .  - Would recommend minimizing FS's as much as possible as patient has not been extremely high, and overall prognosis is guarded.   - If continues to not eat, will likely discontinue Lantus.    #Acute kidney injury superimposed on CKD.   hx of CKD. Cr in 02/2025 ~1.5. Now ~2.4. Could be iso poor PO intake.    Plan:  - Avoid nephrotoxic agents.      Patient was discharged to: [facility]    New medications: none  Changes to old medications: none   Medications that were stopped: none     Physical exam at the time of discharge:  General: Alert and oriented x 0. No acute distress. Well-nourished.  Eyes: EOMI. Anicteric.  HEENT: Moist mucous membranes. No scleral icterus. No cervical lymphadenopathy.  Lungs: Clear to auscultation bilaterally. No accessory muscle use.  Cardiovascular: Regular rate and rhythm. No murmur. No JVD.  Abdomen: Soft, non-tender and non-distended. No palpable masses.  Extremities: No edema. Non-tender.  Skin: No rashes or lesions. Warm.  Neurologic: No focal neurological deficits. CN II-XII grossly intact, but not individually tested.       PMHx of NSCLC w/ mets to brain, DM, CKD, and recent hip Fx s/p repair w/ cc/b seizure presents to [facility] for worsening mental status w/ new MRI brain findings of worsening tumor progression. Pt continued on decadron for brain mets. Pt hypertensive on admission hypertensive controlled with Hydral. Radiation oncology consulted and state that patient is not candidate for radiation therapy. Pt made comfort care. Accepted to [facility] for placement.     Hospital course (by problem):     # NSCLC metastatic to brain.    Dx w/ NSCLC about 10months ago w/ worsening mets to brain. MRI brain on 5/13/25 showing worsening progression w/ vasogenic edema and midline shift. Daughter endorses patient has rapidly declining mental status since February.  Follows w/ Dr. Jim and per most recent chart note, patient is not a candidate for any additl therapies.    Plan  - Patient is not an optimal candidate for palliative RT.  - will stop decadron   - Decision made for [facility].     # Hypertensive urgency.   RESOLVED  Plan:  - no need for home meds, not within GOC - will stop home aspirin, statin     #Seizure.   Hx of seizure after hip surgery during last admission. Unclear of keppra dose. Per surescript 500mg q12.   Plan:  - Keppra 500mg IV q12.    #Stercoral colitis.    Outpatient CTAP showing sterocoral colitis.   - c/w on senna and miralax.    # Diabetes.   Hx of DM. Per daughter patient on Lantus 10u and humalog 12u.  - Patient is not currently eating, refusing diet.  - FS are being checked Q8 hours and have been .  - Would recommend minimizing FS's as much as possible as patient has not been extremely high, and overall prognosis is guarded.   - If continues to not eat, will discontinue Lantus as patient is comfort care and not eating     #Acute kidney injury superimposed on CKD.   hx of CKD. Cr in 02/2025 ~1.5. Now ~2.4. Could be iso poor PO intake.    Plan:  - Avoid nephrotoxic agents.    Patient was discharged to: [facility]    New medications: none  Changes to old medications: none   Medications that were stopped: decadron    Physical exam at the time of discharge:  General: Alert and oriented x 0. No acute distress. Well-nourished.  Eyes: EOMI. Anicteric.  HEENT: Moist mucous membranes. No scleral icterus. No cervical lymphadenopathy.  Lungs: Clear to auscultation bilaterally. No accessory muscle use.  Cardiovascular: Regular rate and rhythm. No murmur. No JVD.  Abdomen: Soft, non-tender and non-distended. No palpable masses.  Extremities: No edema. Non-tender.  Skin: No rashes or lesions. Warm.  Neurologic: No focal neurological deficits. CN II-XII grossly intact, but not individually tested.

## 2025-05-19 NOTE — PROGRESS NOTE ADULT - ASSESSMENT
74F with PMHx of NSCLC w/ mets to brain, DM, CKD, and recent hip Fx s/p repair w/ cc/b seizure presents to [facility] for worsening mental status w/ new MRI brain findings of worsening tumor progression. Admitted for further JEROME and management, now comfort measures only,

## 2025-05-19 NOTE — DISCHARGE NOTE PROVIDER - ATTENDING DISCHARGE PHYSICAL EXAMINATION:
15-Jul-2023 22:57 NAD, resting in bed  no respiratory distress  comfortable appearing    planned for discharge to [place] today  will be stopping steroids, but continuing IV keppra for seizure prophylaxis.  appreciate input from palliative care and heme onc  updated Dr. Jim - patient's daughter requesting a call from him    rest as above  diet can be advanced to regular diet     Time spent on the encounter excludes teaching and separately reported services

## 2025-05-19 NOTE — PROGRESS NOTE ADULT - PROBLEM SELECTOR PROBLEM 6
Acute kidney injury superimposed on CKD
Acute kidney injury superimposed on CKD
Encounter for palliative care
Encounter for palliative care
Acute kidney injury superimposed on CKD
Acute kidney injury superimposed on CKD

## 2025-05-20 ENCOUNTER — APPOINTMENT (OUTPATIENT)
Dept: HEMATOLOGY ONCOLOGY | Facility: CLINIC | Age: 75
End: 2025-05-20

## 2025-05-20 ENCOUNTER — NON-APPOINTMENT (OUTPATIENT)
Age: 75
End: 2025-05-20

## 2025-05-25 DIAGNOSIS — G93.6 CEREBRAL EDEMA: ICD-10-CM

## 2025-05-25 DIAGNOSIS — G93.40 ENCEPHALOPATHY, UNSPECIFIED: ICD-10-CM

## 2025-05-25 DIAGNOSIS — G93.5 COMPRESSION OF BRAIN: ICD-10-CM

## 2025-05-25 DIAGNOSIS — G40.909 EPILEPSY, UNSPECIFIED, NOT INTRACTABLE, WITHOUT STATUS EPILEPTICUS: ICD-10-CM

## 2025-05-25 DIAGNOSIS — I16.0 HYPERTENSIVE URGENCY: ICD-10-CM

## 2025-05-25 DIAGNOSIS — C79.31 SECONDARY MALIGNANT NEOPLASM OF BRAIN: ICD-10-CM

## 2025-05-25 DIAGNOSIS — E78.5 HYPERLIPIDEMIA, UNSPECIFIED: ICD-10-CM

## 2025-05-25 DIAGNOSIS — E83.42 HYPOMAGNESEMIA: ICD-10-CM

## 2025-05-25 DIAGNOSIS — N18.30 CHRONIC KIDNEY DISEASE, STAGE 3 UNSPECIFIED: ICD-10-CM

## 2025-05-25 DIAGNOSIS — Z91.018 ALLERGY TO OTHER FOODS: ICD-10-CM

## 2025-05-25 DIAGNOSIS — E11.65 TYPE 2 DIABETES MELLITUS WITH HYPERGLYCEMIA: ICD-10-CM

## 2025-05-25 DIAGNOSIS — Z79.82 LONG TERM (CURRENT) USE OF ASPIRIN: ICD-10-CM

## 2025-05-25 DIAGNOSIS — Z96.641 PRESENCE OF RIGHT ARTIFICIAL HIP JOINT: ICD-10-CM

## 2025-05-25 DIAGNOSIS — I12.9 HYPERTENSIVE CHRONIC KIDNEY DISEASE WITH STAGE 1 THROUGH STAGE 4 CHRONIC KIDNEY DISEASE, OR UNSPECIFIED CHRONIC KIDNEY DISEASE: ICD-10-CM

## 2025-05-25 DIAGNOSIS — C78.00 SECONDARY MALIGNANT NEOPLASM OF UNSPECIFIED LUNG: ICD-10-CM

## 2025-05-25 DIAGNOSIS — C34.12 MALIGNANT NEOPLASM OF UPPER LOBE, LEFT BRONCHUS OR LUNG: ICD-10-CM

## 2025-05-25 DIAGNOSIS — Z79.4 LONG TERM (CURRENT) USE OF INSULIN: ICD-10-CM

## 2025-05-25 DIAGNOSIS — N17.9 ACUTE KIDNEY FAILURE, UNSPECIFIED: ICD-10-CM

## 2025-05-25 DIAGNOSIS — K52.9 NONINFECTIVE GASTROENTERITIS AND COLITIS, UNSPECIFIED: ICD-10-CM

## 2025-05-25 DIAGNOSIS — Z51.5 ENCOUNTER FOR PALLIATIVE CARE: ICD-10-CM

## 2025-05-25 DIAGNOSIS — Z66 DO NOT RESUSCITATE: ICD-10-CM

## 2025-05-25 DIAGNOSIS — C78.7 SECONDARY MALIGNANT NEOPLASM OF LIVER AND INTRAHEPATIC BILE DUCT: ICD-10-CM

## 2025-05-25 DIAGNOSIS — E11.22 TYPE 2 DIABETES MELLITUS WITH DIABETIC CHRONIC KIDNEY DISEASE: ICD-10-CM

## 2025-05-25 DIAGNOSIS — M06.9 RHEUMATOID ARTHRITIS, UNSPECIFIED: ICD-10-CM

## 2025-05-28 ENCOUNTER — NON-APPOINTMENT (OUTPATIENT)
Age: 75
End: 2025-05-28

## 2025-06-02 PROCEDURE — 82570 ASSAY OF URINE CREATININE: CPT

## 2025-06-02 PROCEDURE — 83935 ASSAY OF URINE OSMOLALITY: CPT

## 2025-06-02 PROCEDURE — 85730 THROMBOPLASTIN TIME PARTIAL: CPT

## 2025-06-02 PROCEDURE — 99285 EMERGENCY DEPT VISIT HI MDM: CPT

## 2025-06-02 PROCEDURE — 81001 URINALYSIS AUTO W/SCOPE: CPT

## 2025-06-02 PROCEDURE — 96374 THER/PROPH/DIAG INJ IV PUSH: CPT

## 2025-06-02 PROCEDURE — 86850 RBC ANTIBODY SCREEN: CPT

## 2025-06-02 PROCEDURE — 86901 BLOOD TYPING SEROLOGIC RH(D): CPT

## 2025-06-02 PROCEDURE — 84100 ASSAY OF PHOSPHORUS: CPT

## 2025-06-02 PROCEDURE — 85027 COMPLETE CBC AUTOMATED: CPT

## 2025-06-02 PROCEDURE — 70551 MRI BRAIN STEM W/O DYE: CPT

## 2025-06-02 PROCEDURE — 82565 ASSAY OF CREATININE: CPT

## 2025-06-02 PROCEDURE — 83036 HEMOGLOBIN GLYCOSYLATED A1C: CPT

## 2025-06-02 PROCEDURE — 82962 GLUCOSE BLOOD TEST: CPT

## 2025-06-02 PROCEDURE — 80048 BASIC METABOLIC PNL TOTAL CA: CPT

## 2025-06-02 PROCEDURE — 85610 PROTHROMBIN TIME: CPT

## 2025-06-02 PROCEDURE — 83735 ASSAY OF MAGNESIUM: CPT

## 2025-06-02 PROCEDURE — 36415 COLL VENOUS BLD VENIPUNCTURE: CPT

## 2025-06-02 PROCEDURE — 80076 HEPATIC FUNCTION PANEL: CPT

## 2025-06-02 PROCEDURE — 84540 ASSAY OF URINE/UREA-N: CPT

## 2025-06-02 PROCEDURE — 85025 COMPLETE CBC W/AUTO DIFF WBC: CPT

## 2025-06-02 PROCEDURE — 86900 BLOOD TYPING SEROLOGIC ABO: CPT

## 2025-06-02 PROCEDURE — 84133 ASSAY OF URINE POTASSIUM: CPT

## 2025-06-02 PROCEDURE — 84156 ASSAY OF PROTEIN URINE: CPT

## 2025-06-02 PROCEDURE — 71250 CT THORAX DX C-: CPT

## 2025-06-02 PROCEDURE — 84300 ASSAY OF URINE SODIUM: CPT

## 2025-06-02 PROCEDURE — 74176 CT ABD & PELVIS W/O CONTRAST: CPT

## 2025-06-05 NOTE — PATIENT PROFILE ADULT - FUNCTIONAL ASSESSMENT - BASIC MOBILITY 6.
No indicators present 3-calculated by average/Not able to assess (calculate score using [facility] averaging method)

## 2025-06-06 ENCOUNTER — APPOINTMENT (OUTPATIENT)
Dept: ORTHOPEDIC SURGERY | Facility: CLINIC | Age: 75
End: 2025-06-06

## 2025-07-02 ENCOUNTER — NON-APPOINTMENT (OUTPATIENT)
Age: 75
End: 2025-07-02

## 2025-07-17 ENCOUNTER — NON-APPOINTMENT (OUTPATIENT)
Age: 75
End: 2025-07-17

## 2025-09-11 ENCOUNTER — NON-APPOINTMENT (OUTPATIENT)
Age: 75
End: 2025-09-11

## (undated) DEVICE — DRSG AQUACEL 3.5 X 10"

## (undated) DEVICE — DRAPE LIGHT HANDLE COVER (BLUE)

## (undated) DEVICE — DRSG PREVENA PEEL & PLACE KIT 20CM

## (undated) DEVICE — DRAPE LIGHT HANDLE COVER (GREEN)

## (undated) DEVICE — DRSG COBAN 6"

## (undated) DEVICE — DRAPE C ARM 41X74"

## (undated) DEVICE — SUT STRATAFIX SPIRAL MONOCRYL PLUS 3-0 30CM PS-1 UNDYED

## (undated) DEVICE — PREP CHLORAPREP HI-LITE ORANGE 26ML

## (undated) DEVICE — WOUND IRR SURGIPHOR

## (undated) DEVICE — GLV 7 PROTEXIS (WHITE)

## (undated) DEVICE — PREP DURAPREP 26CC

## (undated) DEVICE — NDL ASPIRATION VIZISHOT2 25G

## (undated) DEVICE — Device

## (undated) DEVICE — ELCTR GROUNDING PAD ADULT COVIDIEN

## (undated) DEVICE — ELCTR BOVIE PENCIL BLADE 10FT

## (undated) DEVICE — DRSG DERMABOND PRINEO 22CM

## (undated) DEVICE — DRILL BIT BIOMET RINGLOCK QUICK CONNECT 3.2MM X 30MM

## (undated) DEVICE — GLV 8 PROTEXIS (WHITE)

## (undated) DEVICE — SUT VICRYL 0 36" CT-1 UNDYED

## (undated) DEVICE — ELCTR AQUAMANTYS BIPOLAR SEALER 6.0

## (undated) DEVICE — SUT VICRYL 2-0 27" CT-1 UNDYED

## (undated) DEVICE — NDL ARCHPOINT PULMONARY 21G

## (undated) DEVICE — SUT STRATAFIX SPIRAL PDS PLUS 1 30X30CM OS-6 VIOLET

## (undated) DEVICE — SUT ETHIBOND 1 30" OS8

## (undated) DEVICE — SPECIMEN CONTAINER 4OZ

## (undated) DEVICE — DRAPE TOWEL BLUE 17" X 24"

## (undated) DEVICE — SAW BLADE STRYKER SAGITTAL 81.5X12.5X1.19MM

## (undated) DEVICE — WARMING BLANKET UPPER ADULT

## (undated) DEVICE — GLV 7 PROTEXIS ORTHO (CREAM)

## (undated) DEVICE — ELCTR STRYKER NEPTUNE SMOKE EVACUATION PENCIL (GREEN)

## (undated) DEVICE — HOOD T7 PLUS PEELAWAY

## (undated) DEVICE — ELCTR BOVIE PENCIL SMOKE EVACUATION

## (undated) DEVICE — PACK TOTAL HIP (2 PACKS)

## (undated) DEVICE — WOUND IRR IRRISEPT W 0.5 CHG

## (undated) DEVICE — DRAPE 3/4 SHEET 52X76"

## (undated) DEVICE — FORCEP RADIAL JAW PULMONARY BIOPSY 1.8MM OD 2MM DISP

## (undated) DEVICE — SUT STRATAFIX SPIRAL PDS PLUS 2-0 36X36CM CT-1 VIOLET

## (undated) DEVICE — DRAPE SUPINE ESYSUIT

## (undated) DEVICE — GLV 7.5 PROTEXIS ORTHO (CREAM)

## (undated) DEVICE — GLV 7.5 PROTEXIS (WHITE)

## (undated) DEVICE — NDL HYPO SAFE 22G X 1.5" (BLACK)